# Patient Record
Sex: MALE | Race: WHITE | Employment: UNEMPLOYED | URBAN - METROPOLITAN AREA
[De-identification: names, ages, dates, MRNs, and addresses within clinical notes are randomized per-mention and may not be internally consistent; named-entity substitution may affect disease eponyms.]

---

## 2017-01-22 ENCOUNTER — HOSPITAL ENCOUNTER (EMERGENCY)
Age: 42
Discharge: HOME | End: 2017-01-22
Attending: EMERGENCY MEDICINE
Payer: SELF-PAY

## 2017-01-22 VITALS
BODY MASS INDEX: 25.19 KG/M2 | SYSTOLIC BLOOD PRESSURE: 136 MMHG | OXYGEN SATURATION: 98 % | WEIGHT: 160.5 LBS | HEART RATE: 88 BPM | DIASTOLIC BLOOD PRESSURE: 88 MMHG | HEIGHT: 67 IN | TEMPERATURE: 97.5 F | RESPIRATION RATE: 18 BRPM

## 2017-01-22 DIAGNOSIS — Z20.2 STD EXPOSURE: Primary | ICD-10-CM

## 2017-01-22 DIAGNOSIS — B37.41 YEAST CYSTITIS: ICD-10-CM

## 2017-01-22 LAB
APPEARANCE UR: CLEAR
BACTERIA URNS QL MICRO: NEGATIVE /HPF
BILIRUB UR QL: NEGATIVE
COLOR UR: ABNORMAL
EPITH CASTS URNS QL MICRO: ABNORMAL /LPF
GLUCOSE UR STRIP.AUTO-MCNC: NEGATIVE MG/DL
HGB UR QL STRIP: NEGATIVE
KETONES UR QL STRIP.AUTO: NEGATIVE MG/DL
LEUKOCYTE ESTERASE UR QL STRIP.AUTO: ABNORMAL
NITRITE UR QL STRIP.AUTO: NEGATIVE
PH UR STRIP: 7 [PH] (ref 5–8)
PROT UR STRIP-MCNC: NEGATIVE MG/DL
RBC #/AREA URNS HPF: ABNORMAL /HPF (ref 0–5)
SP GR UR REFRACTOMETRY: 1.01 (ref 1–1.03)
UA: UC IF INDICATED,UAUC: ABNORMAL
UROBILINOGEN UR QL STRIP.AUTO: 4 EU/DL (ref 0.2–1)
WBC URNS QL MICRO: ABNORMAL /HPF (ref 0–4)
YEAST URNS QL MICRO: PRESENT

## 2017-01-22 PROCEDURE — 87086 URINE CULTURE/COLONY COUNT: CPT | Performed by: EMERGENCY MEDICINE

## 2017-01-22 PROCEDURE — 99283 EMERGENCY DEPT VISIT LOW MDM: CPT

## 2017-01-22 PROCEDURE — 96372 THER/PROPH/DIAG INJ SC/IM: CPT

## 2017-01-22 PROCEDURE — 74011250636 HC RX REV CODE- 250/636: Performed by: PHYSICIAN ASSISTANT

## 2017-01-22 PROCEDURE — 74011250637 HC RX REV CODE- 250/637: Performed by: PHYSICIAN ASSISTANT

## 2017-01-22 PROCEDURE — 87491 CHLMYD TRACH DNA AMP PROBE: CPT | Performed by: PHYSICIAN ASSISTANT

## 2017-01-22 PROCEDURE — 81001 URINALYSIS AUTO W/SCOPE: CPT | Performed by: PHYSICIAN ASSISTANT

## 2017-01-22 PROCEDURE — 74011000250 HC RX REV CODE- 250: Performed by: PHYSICIAN ASSISTANT

## 2017-01-22 RX ORDER — AZITHROMYCIN 250 MG/1
1000 TABLET, FILM COATED ORAL
Status: COMPLETED | OUTPATIENT
Start: 2017-01-22 | End: 2017-01-22

## 2017-01-22 RX ORDER — FLUCONAZOLE 150 MG/1
150 TABLET ORAL
Qty: 1 TAB | Refills: 0 | Status: SHIPPED | OUTPATIENT
Start: 2017-01-22 | End: 2017-01-22

## 2017-01-22 RX ADMIN — AZITHROMYCIN 1000 MG: 250 TABLET, FILM COATED ORAL at 11:27

## 2017-01-22 RX ADMIN — CEFTRIAXONE SODIUM 250 MG: 250 INJECTION, POWDER, FOR SOLUTION INTRAMUSCULAR; INTRAVENOUS at 11:28

## 2017-01-22 NOTE — ED NOTES
The patient was discharged home by Katiana Sage  in stable condition. The patient is alert and oriented, in no respiratory distress and discharge vital signs obtained. The patient's diagnosis, condition and treatment were explained to the pt. The patient expressed understanding. 1 prescription given. No work/school note given. A discharge plan has been developed. A  was not involved in the process. Aftercare instructions were given to the pt.   Pt ambulatory out of the ED

## 2017-01-22 NOTE — DISCHARGE INSTRUCTIONS
Exposure to Sexually Transmitted Infections: Care Instructions  Your Care Instructions  Sexually transmitted infections (STIs) are those diseases spread by sexual contact. There are at least 20 different STIs, including chlamydia, gonorrhea, syphilis, and human immunodeficiency virus (HIV), which causes AIDS. Bacteria-caused STIs can be treated and cured. STIs caused by viruses, such as HIV, can be treated but not cured. Some STIs can reduce a woman's chances of getting pregnant in the future. STIs are spread during sexual contact, such as vaginal intercourse and oral or anal sex. Follow-up care is a key part of your treatment and safety. Be sure to make and go to all appointments, and call your doctor if you are having problems. Its also a good idea to know your test results and keep a list of the medicines you take. How can you care for yourself at home? · Your doctor may have given you a shot of antibiotics. If your doctor prescribed antibiotic pills, take them as directed. Do not stop taking them just because you feel better. You need to take the full course of antibiotics. · Do not have sexual contact while you have symptoms of an STI or are being treated for an STI. · Tell your sex partner (or partners) that he or she will need treatment. · If you are a woman, do not douche. Douching changes the normal balance of bacteria in the vagina and may spread an infection up into your reproductive organs. To prevent exposure to STIs in the future  · Use latex condoms every time you have sex. Use them from the beginning to the end of sexual contact. · Talk to your partner before you have sex. Find out if he or she has or is at risk for any STI. Keep in mind that a person may be able to spread an STI even if he or she does not have symptoms. · Do not have sex if you are being treated for an STI. · Do not have sex with anyone who has symptoms of an STI, such as sores on the genitals or mouth.   · Having one sex partner (who does not have STIs and does not have sex with anyone else) is a good way to avoid STIs. When should you call for help? Call your doctor now or seek immediate medical care if:  · You have new pain in your belly or pelvis. · You have symptoms of a urinary tract infection. These may include:  ¨ Pain or burning when you urinate. ¨ A frequent need to urinate without being able to pass much urine. ¨ Pain in the flank, which is just below the rib cage and above the waist on either side of the back. ¨ Blood in your urine. ¨ A fever. · You have new or worsening pain or swelling in the scrotum. Watch closely for changes in your health, and be sure to contact your doctor if:  · You have unusual vaginal bleeding. · You have a discharge from the vagina or penis. · You have any new symptoms, such as sores, bumps, rashes, blisters, or warts. · You have itching, tingling, pain, or burning in the genital or anal area. · You think you may have an STI. Where can you learn more? Go to http://manisha-anne marie.info/. Enter X104 in the search box to learn more about \"Exposure to Sexually Transmitted Infections: Care Instructions. \"  Current as of: May 27, 2016  Content Version: 11.1  © 6216-4374 [employer]. Care instructions adapted under license by [employer] (which disclaims liability or warranty for this information). If you have questions about a medical condition or this instruction, always ask your healthcare professional. [place] [address] any warranty or liability for your use of this information. Candidiasis: Care Instructions  Your Care Instructions  Candidiasis (say \"hjy-yih-ZN-uh-cassandra\") is a yeast infection. Yeast normally lives in your body. But it can cause problems if your body's defenses don't work as they should. Some medicines can increase your chance of getting a yeast infection.  These include antibiotics, steroids, and cancer drugs. And some diseases like AIDS and diabetes can make you more likely to get yeast infections. There are different types of yeast infections. Alina Bolanos is a yeast infection in the mouth. It usually occurs in people with weak immune systems. It causes white patches inside the mouth and throat. Yeast infections of the skin usually occur in skin folds where the skin stays moist. They cause red, oozing patches on your skin. Babies can get these infections under the diaper. People who often wear gloves can get them on their hands. Many women get vaginal yeast infections. They are most common when women take antibiotics. These infections can cause the vagina to itch and burn. They also cause white discharge that looks like cottage cheese. In rare cases, yeast infects the blood. This can cause serious disease. This kind of infection is treated with medicine given through a needle into a vein (IV). After you start treatment, a yeast infection usually goes away quickly. But if your immune system is weak, the infection may come back. Tell your doctor if you get yeast infections often. Follow-up care is a key part of your treatment and safety. Be sure to make and go to all appointments, and call your doctor if you are having problems. It's also a good idea to know your test results and keep a list of the medicines you take. How can you care for yourself at home? · Take your medicines exactly as prescribed. Call your doctor if you think you are having a problem with your medicine. · Use antibiotics only as directed by your doctor. · Eat yogurt with live cultures. It has bacteria called lactobacillus. It may help prevent some types of yeast infections. · Keep your skin clean and dry. Put powder on moist places. · If you are using a cream or suppository to treat a vaginal yeast infection, don't use condoms or a diaphragm. Use a different type of birth control. · Eat a healthy diet and get regular exercise. This will help keep your immune system strong. When should you call for help? Call your doctor now or seek immediate medical care if:  · You have a fever. · You are pregnant and have signs of a vaginal or urinary tract infection such as:  ¨ Severe itching in your vagina. ¨ Pain during sex or when you urinate. ¨ Unusual discharge from your vagina. ¨ A frequent urge to urinate. ¨ Urine that is cloudy or smells bad. Watch closely for changes in your health, and be sure to contact your doctor if:  · You do not get better as expected. Where can you learn more? Go to http://manishaJanalakshmianne marie.info/. Enter H441 in the search box to learn more about \"Candidiasis: Care Instructions. \"  Current as of: February 5, 2016  Content Version: 11.1  © 4084-4276 [employer]. Care instructions adapted under license by [employer] (which disclaims liability or warranty for this information). If you have questions about a medical condition or this instruction, always ask your healthcare professional. [place] [address] any warranty or liability for your use of this information. We hope that we have addressed all of your medical concerns. The examination and treatment you received in the Emergency Department were for an emergent problem and were not intended as complete care. It is important that you follow up with your healthcare provider(s) for ongoing care. If your symptoms worsen or do not improve as expected, and you are unable to reach your usual health care provider(s), you should return to the Emergency Department. Today's healthcare is undergoing tremendous change, and patient satisfaction surveys are one of the many tools to assess the quality of medical care. You may receive a survey from the [employer] regarding your experience in the Emergency Department.   I hope that your experience has been completely positive, particularly the medical care that I provided. As such, please participate in the survey; anything less than excellent does not meet my expectations or intentions. [facility] and [facility] participate in nationally recognized quality of care measures. If your blood pressure is greater than 120/80, as reported below, we urge that you seek medical care to address the potential of high blood pressure, commonly known as hypertension. Hypertension can be hereditary or can be caused by certain medical conditions, pain, stress, or \"white coat syndrome. \"       Please make an appointment with your health care provider(s) for follow up of your Emergency Department visit. VITALS:   Patient Vitals for the past 8 hrs:   Temp Pulse Resp BP SpO2   01/22/17 1112 97.5 °F (36.4 °C) 96 16 (!) 189/108 98 %          Thank you for allowing us to provide you with medical care today. We realize that you have many choices for your emergency care needs. Please choose us in the future for any continued health care needs. Agustina Orozco, [address] [place] [address].   Office: [phone]            Recent Results (from the past 24 hour(s))   URINALYSIS W/ REFLEX CULTURE    Collection Time: 01/22/17 11:20 AM   Result Value Ref Range    Color YELLOW/STRAW      Appearance CLEAR CLEAR      Specific gravity 1.010 1.003 - 1.030      pH (UA) 7.0 5.0 - 8.0      Protein NEGATIVE  NEG mg/dL    Glucose NEGATIVE  NEG mg/dL    Ketone NEGATIVE  NEG mg/dL    Bilirubin NEGATIVE  NEG      Blood NEGATIVE  NEG      Urobilinogen 4.0 (H) 0.2 - 1.0 EU/dL    Nitrites NEGATIVE  NEG      Leukocyte Esterase SMALL (A) NEG      WBC 5-10 0 - 4 /hpf    RBC 0-5 0 - 5 /hpf    Epithelial cells FEW FEW /lpf    Bacteria NEGATIVE  NEG /hpf    UA:UC IF INDICATED URINE CULTURE ORDERED (A) CNI      Yeast PRESENT (A) NEG         No results found.

## 2017-01-22 NOTE — ED PROVIDER NOTES
HPI Comments: Patient presents by private vehicle. Patient reports dysuria and white penile discharge. Patient reports symptoms have been intermittent for 1 month. Patient reports same sexual partner for past 6 months but says recently partner has cheated. Patient denies fever, chills. Patient is a 46 y.o. male presenting with penile problem. The history is provided by the patient. Penis Injury   This is a new problem. The current episode started more than 1 week ago. The problem occurs constantly. The problem has not changed since onset. Primary symptoms include dysuria and penile discharge. Pertinent negatives include no genital itching, no genital lesions, no genital rash, no penile pain, no testicular mass, no swelling, no scrotal pain, no priapism and no inability to urinate. The symptoms occur during urination and spontaneously. The discharge is white and clear. Pertinent negatives include no anorexia, no diaphoresis, no nausea, no vomiting, no abdominal pain, no abdominal swelling, no frequency, no constipation, no diarrhea and no flank pain. There has been no fever. He has tried nothing for the symptoms. Sexual activity: sexually active. He never uses condoms. Patient has had no prior STD. Partner displays symptoms of an STD: yes. Associated medical issues do not include gonorrhea, syphilis, chlamydia, erectile dysfunction, erectile aid use or HIV. Past Medical History:   Diagnosis Date    Hyperlipidemia     Hypertension        History reviewed. No pertinent past surgical history. Family History:   Problem Relation Age of Onset    Hypertension Father        Social History     Social History    Marital status: SINGLE     Spouse name: N/A    Number of children: N/A    Years of education: N/A     Occupational History    Not on file.      Social History Main Topics    Smoking status: Current Every Day Smoker     Packs/day: 2.00     Years: 20.00    Smokeless tobacco: Not on file    Alcohol use Yes    Drug use: No    Sexual activity: Not on file     Other Topics Concern    Not on file     Social History Narrative         ALLERGIES: Review of patient's allergies indicates no known allergies. Review of Systems   Constitutional: Negative. Negative for diaphoresis. HENT: Negative. Eyes: Negative. Respiratory: Negative. Cardiovascular: Negative. Gastrointestinal: Negative. Negative for abdominal pain, anorexia, constipation, diarrhea, nausea and vomiting. Endocrine: Negative. Genitourinary: Positive for discharge, dysuria and penile discharge. Negative for flank pain, frequency and penile pain. Musculoskeletal: Negative. Skin: Negative. Allergic/Immunologic: Negative. Neurological: Negative. Hematological: Negative. All other systems reviewed and are negative. Vitals:    01/22/17 1112   BP: (!) 189/108   Pulse: 96   Resp: 16   Temp: 97.5 °F (36.4 °C)   SpO2: 98%   Weight: 72.8 kg (160 lb 7.9 oz)   Height: 5' 7\" (1.702 m)            Physical Exam   Constitutional: He is oriented to person, place, and time. He appears well-developed and well-nourished. HENT:   Head: Normocephalic and atraumatic. Right Ear: External ear normal.   Left Ear: External ear normal.   Mouth/Throat: Oropharynx is clear and moist. No oropharyngeal exudate. Eyes: Conjunctivae and EOM are normal. Pupils are equal, round, and reactive to light. Right eye exhibits no discharge. Left eye exhibits no discharge. No scleral icterus. Neck: Normal range of motion. Neck supple. No tracheal deviation present. No thyromegaly present. Cardiovascular: Normal rate, regular rhythm, normal heart sounds and intact distal pulses. No murmur heard. Pulmonary/Chest: Effort normal and breath sounds normal. No respiratory distress. He has no wheezes. He has no rales. Abdominal: Soft. Bowel sounds are normal. He exhibits no distension. There is no tenderness.  There is no rebound and no guarding. Genitourinary: No swelling in the scrotum or testes. Musculoskeletal: Normal range of motion. He exhibits no edema or tenderness. Lymphadenopathy:     He has no cervical adenopathy. Neurological: He is alert and oriented to person, place, and time. No cranial nerve deficit. Coordination normal.   Skin: Skin is warm. No rash noted. No erythema. Psychiatric: He has a normal mood and affect. His behavior is normal. Judgment and thought content normal.   Nursing note and vitals reviewed. MDM  Number of Diagnoses or Management Options  STD exposure:   Yeast cystitis:   Diagnosis management comments: The patient presents with penile discharge and dysuria with a differential diagnosis of uti, yeast cystitis, STI. Assesment/Plan- G/C test pending. Treated in ED. Urine analysis with yeast, patient provided with prescription for diflucan. Patient encouraged to follow up with PCP and patient is educated on reasons to return to the ED.          Amount and/or Complexity of Data Reviewed  Clinical lab tests: ordered and reviewed      ED Course       Procedures

## 2017-01-22 NOTE — ED TRIAGE NOTES
\"I think I have a STD. \"  Burning with urination started with urination. Clear/white penile d/c x 3 days. Same sex partner for past 6 months, but says she has recently cheated.

## 2017-01-23 LAB
C TRACH DNA SPEC QL NAA+PROBE: NEGATIVE
N GONORRHOEA DNA SPEC QL NAA+PROBE: NEGATIVE
SAMPLE TYPE: NORMAL
SERVICE CMNT-IMP: NORMAL
SPECIMEN SOURCE: NORMAL

## 2017-01-24 LAB
BACTERIA SPEC CULT: NORMAL
CC UR VC: NORMAL
SERVICE CMNT-IMP: NORMAL

## 2018-07-30 ENCOUNTER — HOSPITAL ENCOUNTER (EMERGENCY)
Age: 43
Discharge: HOME | End: 2018-07-30
Attending: EMERGENCY MEDICINE
Payer: SELF-PAY

## 2018-07-30 ENCOUNTER — APPOINTMENT (OUTPATIENT)
Dept: CT IMAGING | Age: 43
End: 2018-07-30
Attending: EMERGENCY MEDICINE
Payer: SELF-PAY

## 2018-07-30 VITALS
WEIGHT: 160.27 LBS | TEMPERATURE: 97.6 F | OXYGEN SATURATION: 97 % | DIASTOLIC BLOOD PRESSURE: 95 MMHG | SYSTOLIC BLOOD PRESSURE: 146 MMHG | RESPIRATION RATE: 16 BRPM | BODY MASS INDEX: 25.1 KG/M2 | HEART RATE: 86 BPM

## 2018-07-30 DIAGNOSIS — I10 ESSENTIAL HYPERTENSION: ICD-10-CM

## 2018-07-30 DIAGNOSIS — G45.8 OTHER SPECIFIED TRANSIENT CEREBRAL ISCHEMIAS: Primary | ICD-10-CM

## 2018-07-30 LAB
ALBUMIN SERPL-MCNC: 3.5 G/DL (ref 3.5–5)
ALBUMIN/GLOB SERPL: 1 {RATIO} (ref 1.1–2.2)
ALP SERPL-CCNC: 93 U/L (ref 45–117)
ALT SERPL-CCNC: 21 U/L (ref 12–78)
ANION GAP SERPL CALC-SCNC: 10 MMOL/L (ref 5–15)
APTT PPP: 26.1 SEC (ref 22.1–32)
AST SERPL-CCNC: 14 U/L (ref 15–37)
ATRIAL RATE: 74 BPM
BASOPHILS # BLD: 0 K/UL (ref 0–0.1)
BASOPHILS NFR BLD: 1 % (ref 0–1)
BILIRUB SERPL-MCNC: 0.3 MG/DL (ref 0.2–1)
BUN SERPL-MCNC: 13 MG/DL (ref 6–20)
BUN/CREAT SERPL: 9 (ref 12–20)
CALCIUM SERPL-MCNC: 9.1 MG/DL (ref 8.5–10.1)
CALCULATED P AXIS, ECG09: 41 DEGREES
CALCULATED R AXIS, ECG10: 34 DEGREES
CALCULATED T AXIS, ECG11: 10 DEGREES
CHLORIDE SERPL-SCNC: 105 MMOL/L (ref 97–108)
CO2 SERPL-SCNC: 27 MMOL/L (ref 21–32)
CREAT SERPL-MCNC: 1.45 MG/DL (ref 0.7–1.3)
DIAGNOSIS, 93000: NORMAL
DIFFERENTIAL METHOD BLD: NORMAL
EOSINOPHIL # BLD: 0.3 K/UL (ref 0–0.4)
EOSINOPHIL NFR BLD: 4 % (ref 0–7)
ERYTHROCYTE [DISTWIDTH] IN BLOOD BY AUTOMATED COUNT: 13.2 % (ref 11.5–14.5)
GLOBULIN SER CALC-MCNC: 3.4 G/DL (ref 2–4)
GLUCOSE SERPL-MCNC: 94 MG/DL (ref 65–100)
HCT VFR BLD AUTO: 44.5 % (ref 36.6–50.3)
HGB BLD-MCNC: 15.2 G/DL (ref 12.1–17)
INR PPP: 1 (ref 0.9–1.1)
LYMPHOCYTES # BLD: 1.5 K/UL
LYMPHOCYTES NFR BLD: 22 % (ref 12–49)
MCH RBC QN AUTO: 30.7 PG (ref 26–34)
MCHC RBC AUTO-ENTMCNC: 34.2 G/DL (ref 30–36.5)
MCV RBC AUTO: 89.9 FL (ref 80–99)
MONOCYTES # BLD: 0.7 K/UL (ref 0–1)
MONOCYTES NFR BLD: 10 % (ref 5–13)
NEUTS SEG # BLD: 4.4 K/UL (ref 1.8–8)
NEUTS SEG NFR BLD: 63 % (ref 32–75)
P-R INTERVAL, ECG05: 130 MS
PLATELET # BLD AUTO: 215 K/UL (ref 150–400)
PMV BLD AUTO: 11.3 FL (ref 8.9–12.9)
POTASSIUM SERPL-SCNC: 3.8 MMOL/L (ref 3.5–5.1)
PROT SERPL-MCNC: 6.9 G/DL (ref 6.4–8.2)
PROTHROMBIN TIME: 9.4 SEC (ref 9–11.1)
Q-T INTERVAL, ECG07: 400 MS
QRS DURATION, ECG06: 88 MS
QTC CALCULATION (BEZET), ECG08: 444 MS
RBC # BLD AUTO: 4.95 M/UL (ref 4.1–5.7)
SODIUM SERPL-SCNC: 142 MMOL/L (ref 136–145)
THERAPEUTIC RANGE,PTTT: NORMAL SECS (ref 58–77)
VENTRICULAR RATE, ECG03: 74 BPM
WBC # BLD AUTO: 6.9 K/UL (ref 4.1–11.1)
XXWBCSUS: 0

## 2018-07-30 PROCEDURE — 80053 COMPREHEN METABOLIC PANEL: CPT | Performed by: EMERGENCY MEDICINE

## 2018-07-30 PROCEDURE — 70450 CT HEAD/BRAIN W/O DYE: CPT

## 2018-07-30 PROCEDURE — 85730 THROMBOPLASTIN TIME PARTIAL: CPT | Performed by: EMERGENCY MEDICINE

## 2018-07-30 PROCEDURE — 36415 COLL VENOUS BLD VENIPUNCTURE: CPT | Performed by: EMERGENCY MEDICINE

## 2018-07-30 PROCEDURE — 85610 PROTHROMBIN TIME: CPT | Performed by: EMERGENCY MEDICINE

## 2018-07-30 PROCEDURE — 74011250637 HC RX REV CODE- 250/637: Performed by: EMERGENCY MEDICINE

## 2018-07-30 PROCEDURE — 99284 EMERGENCY DEPT VISIT MOD MDM: CPT

## 2018-07-30 PROCEDURE — 93005 ELECTROCARDIOGRAM TRACING: CPT

## 2018-07-30 PROCEDURE — 85025 COMPLETE CBC W/AUTO DIFF WBC: CPT | Performed by: EMERGENCY MEDICINE

## 2018-07-30 RX ORDER — HYDROCHLOROTHIAZIDE 12.5 MG/1
12.5 TABLET ORAL DAILY
Qty: 30 TAB | Refills: 1 | Status: SHIPPED | OUTPATIENT
Start: 2018-07-30 | End: 2018-12-13 | Stop reason: SDUPTHER

## 2018-07-30 RX ORDER — ATORVASTATIN CALCIUM 10 MG/1
10 TABLET, FILM COATED ORAL DAILY
Qty: 30 TAB | Refills: 1 | Status: SHIPPED | OUTPATIENT
Start: 2018-07-30 | End: 2018-12-13 | Stop reason: SDUPTHER

## 2018-07-30 RX ORDER — ASPIRIN 81 MG/1
81 TABLET ORAL DAILY
Qty: 30 TAB | Refills: 1 | Status: SHIPPED | OUTPATIENT
Start: 2018-07-30 | End: 2020-09-17

## 2018-07-30 RX ORDER — LISINOPRIL 10 MG/1
10 TABLET ORAL DAILY
Qty: 30 TAB | Refills: 0 | Status: SHIPPED | OUTPATIENT
Start: 2018-07-30 | End: 2018-12-13 | Stop reason: SDUPTHER

## 2018-07-30 RX ORDER — CLONIDINE HYDROCHLORIDE 0.1 MG/1
0.2 TABLET ORAL
Status: COMPLETED | OUTPATIENT
Start: 2018-07-30 | End: 2018-07-30

## 2018-07-30 RX ADMIN — CLONIDINE HYDROCHLORIDE 0.2 MG: 0.1 TABLET ORAL at 07:38

## 2018-07-30 NOTE — LETTER
[facility] EMERGENCY DEPT 
[address] [facility] [place] [id number] 
[phone] Work/School Note Date: 7/30/2018 To Whom It May concern: 
 
Tyler George was seen and treated today in the emergency room by the following provider(s): 
Attending Provider: Oliver Wolfe MD.   
 
Tyler George may return to work on 7/31/2018.  
 
Sincerely, 
 
 
 
 
Lorena Riggins RN

## 2018-07-30 NOTE — DISCHARGE INSTRUCTIONS
Learning About Diuretics for High Blood Pressure  Introduction  Diuretics help to lower blood pressure. This reduces your risk of a heart attack and stroke. It also reduces your risk of kidney disease. Diuretics cause your kidneys to remove sodium and water. They also relax the blood vessel walls. These help lower your blood pressure. Examples  · Chlorthalidone  · Hydrochlorothiazide  Possible side effects  There are some common side effects. They are:  · Too little potassium. · Feeling dizzy. · Rash. · Urinating a lot. · High blood sugar. (But this is not common.)  You may have other side effects. Check the information that comes with your medicine. What to know about taking this medicine  · You may take other medicines for blood pressure. Diuretics can help those work better. They can also prevent extra fluid in your body. · You may need to take potassium pills. Or you may have to watch how much potassium is in your food. Ask your doctor about this. · You may need blood tests to check your kidneys and your potassium level. · Take your medicines exactly as prescribed. Call your doctor if you think you are having a problem with your medicine. · Check with your doctor or pharmacist before you use any other medicines. This includes over-the-counter medicines. Make sure your doctor knows all of the medicines, vitamins, herbal products, and supplements you take. Taking some medicines together can cause problems. Where can you learn more? Go to http://manisha-anne marie.info/. Enter I880 in the search box to learn more about \"Learning About Diuretics for High Blood Pressure. \"  Current as of: May 10, 2017  Content Version: 11.7  © 5534-7148 [employer]. Care instructions adapted under license by [employer] (which disclaims liability or warranty for this information).  If you have questions about a medical condition or this instruction, always ask your healthcare rianna. [address] any warranty or liability for your use of this information. We hope that we have addressed all of your medical concerns. The examination and treatment you received in the Emergency Department were for an emergent problem and were not intended as complete care. It is important that you follow up with your healthcare provider(s) for ongoing care. If your symptoms worsen or do not improve as expected, and you are unable to reach your usual health care provider(s), you should return to the Emergency Department. Today's healthcare is undergoing tremendous change, and patient satisfaction surveys are one of the many tools to assess the quality of medical care. You may receive a survey from the [employer] organization regarding your experience in the Emergency Department. I hope that your experience has been completely positive, particularly the medical care that I provided. As such, please participate in the survey; anything less than excellent does not meet my expectations or intentions. [facility] and [address] [place] [address] participate in nationally recognized quality of care measures. If your blood pressure is greater than 120/80, as reported below, we urge that you seek medical care to address the potential of high blood pressure, commonly known as hypertension. Hypertension can be hereditary or can be caused by certain medical conditions, pain, stress, or \"white coat syndrome. \"       Please make an appointment with your health care provider(s) for follow up of your Emergency Department visit.        VITALS:   Patient Vitals for the past 8 hrs:   Temp Pulse Resp BP SpO2   07/30/18 0830 - - - (!) 146/95 97 %   07/30/18 0800 - - - (!) 181/112 98 %   07/30/18 0745 - - - - 98 %   07/30/18 0744 - - - (!) 175/112 -   07/30/18 0703 97.6 °F (36.4 °C) 86 16 (!) 217/122 98 %          Thank you for allowing us to provide you with medical care today. We realize that you have many choices for your emergency care needs. Please choose us in the future for any continued health care needs. Nupur Mistymaegan Magnant, [address] [place], [place] [address].   Office: [phone]            Recent Results (from the past 24 hour(s))   EKG, 12 LEAD, INITIAL    Collection Time: 07/30/18  7:32 AM   Result Value Ref Range    Ventricular Rate 74 BPM    Atrial Rate 74 BPM    P-R Interval 130 ms    QRS Duration 88 ms    Q-T Interval 400 ms    QTC Calculation (Bezet) 444 ms    Calculated P Axis 41 degrees    Calculated R Axis 34 degrees    Calculated T Axis 10 degrees    Diagnosis       Normal sinus rhythm  Normal ECG  When compared with ECG of 27-SEP-2016 14:40,  No significant change was found     CBC WITH AUTOMATED DIFF    Collection Time: 07/30/18  7:46 AM   Result Value Ref Range    WBC 6.9 4.1 - 11.1 K/uL    RBC 4.95 4.10 - 5.70 M/uL    HGB 15.2 12.1 - 17.0 g/dL    HCT 44.5 36.6 - 50.3 %    MCV 89.9 80.0 - 99.0 FL    MCH 30.7 26.0 - 34.0 PG    MCHC 34.2 30.0 - 36.5 g/dL    RDW 13.2 11.5 - 14.5 %    PLATELET 669 259 - 796 K/uL    MPV 11.3 8.9 - 12.9 FL    NEUTROPHILS 63 32 - 75 %    LYMPHOCYTES 22 12 - 49 %    MONOCYTES 10 5 - 13 %    EOSINOPHILS 4 0 - 7 %    BASOPHILS 1 0 - 1 %    ABS. NEUTROPHILS 4.4 1.8 - 8.0 K/UL    ABS. LYMPHOCYTES 1.5 K/UL    ABS. MONOCYTES 0.7 0.0 - 1.0 K/UL    ABS. EOSINOPHILS 0.3 0.0 - 0.4 K/UL    ABS.  BASOPHILS 0.0 0.0 - 0.1 K/UL    DF AUTOMATED      XXWBCSUS 0     METABOLIC PANEL, COMPREHENSIVE    Collection Time: 07/30/18  7:46 AM   Result Value Ref Range    Sodium 142 136 - 145 mmol/L    Potassium 3.8 3.5 - 5.1 mmol/L    Chloride 105 97 - 108 mmol/L    CO2 27 21 - 32 mmol/L    Anion gap 10 5 - 15 mmol/L    Glucose 94 65 - 100 mg/dL    BUN 13 6 - 20 MG/DL    Creatinine 1.45 (H) 0.70 - 1.30 MG/DL    BUN/Creatinine ratio 9 (L) 12 - 20      GFR est AA >60 >60 ml/min/1.73m2    GFR est non-AA 54 (L) >60 ml/min/1.73m2    Calcium 9.1 8.5 - 10.1 MG/DL    Bilirubin, total 0.3 0.2 - 1.0 MG/DL    ALT (SGPT) 21 12 - 78 U/L    AST (SGOT) 14 (L) 15 - 37 U/L    Alk. phosphatase 93 45 - 117 U/L    Protein, total 6.9 6.4 - 8.2 g/dL    Albumin 3.5 3.5 - 5.0 g/dL    Globulin 3.4 2.0 - 4.0 g/dL    A-G Ratio 1.0 (L) 1.1 - 2.2     PTT    Collection Time: 07/30/18  7:46 AM   Result Value Ref Range    aPTT 26.1 22.1 - 32.0 sec    aPTT, therapeutic range     58.0 - 77.0 SECS   PROTHROMBIN TIME + INR    Collection Time: 07/30/18  7:46 AM   Result Value Ref Range    INR 1.0 0.9 - 1.1      Prothrombin time 9.4 9.0 - 11.1 sec       Ct Head Wo Cont    Result Date: 7/30/2018  HEAD CT WITHOUT CONTRAST: 7/30/2018 7:37 AM INDICATION: Transient ischemic attack. COMPARISON: 9/27/2016. PROCEDURE: Axial images of the head were obtained without contrast. Coronal and sagittal reformats were performed. CT dose reduction was achieved through use of a standardized protocol tailored for this examination and automatic exposure control for dose modulation. FINDINGS: The ventricles and sulci are appropriate in size and configuration for age. No loss of gray-white differentiation to suggest late acute or early subacute infarction. No mass effect or intracranial hemorrhage. IMPRESSION: No acute intracranial abnormality.

## 2018-07-30 NOTE — ED TRIAGE NOTES
Pt presents to ED with c/o transient left arm numbness in left arm last night. Pt states he is supposed to be on an antihypertensive medication but has not been on it for over a year. Pt denies any pain or symptoms.

## 2018-07-30 NOTE — ED PROVIDER NOTES
Patient is a 38 y.o. male presenting with numbness. Numbness        The patient is a 47-year-old white male with a history of a TIA about 2 years prior to admission for which he was admitted to [facility]. He was very hypertensive at that time as well and was sent home on aspirin and high blood pressure medicine but he has had no insurance and has not been on those medications for some time. Last night about 9 PM he was working and  his left arm became numb it lasted for approximately 10 minutes. He was still able to use it had no facial numbness or left leg numbness. In the ER his symptoms are completely resolved. He denies any headache. His blood pressure here initial presentation was 220/120. Past Medical History:   Diagnosis Date    Hyperlipidemia     Hypertension        History reviewed. No pertinent surgical history. Family History:   Problem Relation Age of Onset    Hypertension Father        Social History     Social History    Marital status: SINGLE     Spouse name: N/A    Number of children: N/A    Years of education: N/A     Occupational History    Not on file. Social History Main Topics    Smoking status: Current Every Day Smoker     Packs/day: 1.00     Years: 20.00    Smokeless tobacco: Never Used    Alcohol use Yes    Drug use: No    Sexual activity: Not on file     Other Topics Concern    Not on file     Social History Narrative         ALLERGIES: Review of patient's allergies indicates no known allergies. Review of Systems   Neurological: Positive for numbness. All other systems reviewed and are negative. Vitals:    07/30/18 0703   BP: (!) 217/122   Pulse: 86   Resp: 16   Temp: 97.6 °F (36.4 °C)   SpO2: 98%   Weight: 72.7 kg (160 lb 4.4 oz)            Physical Exam   Constitutional: He is oriented to person, place, and time. He appears well-developed and well-nourished. HENT:   Head: Normocephalic and atraumatic.    Mouth/Throat: Oropharynx is clear and moist. No oropharyngeal exudate. Eyes: Conjunctivae are normal. No scleral icterus. Neck: Neck supple. No thyromegaly present. Cardiovascular: Normal rate, regular rhythm and normal heart sounds. Exam reveals no gallop and no friction rub. No murmur heard. Pulmonary/Chest: Effort normal and breath sounds normal. No stridor. No respiratory distress. He has no wheezes. He has no rales. Abdominal: Soft. Bowel sounds are normal. There is no tenderness. There is no rebound and no guarding. Musculoskeletal: Normal range of motion. Lymphadenopathy:     He has no cervical adenopathy. Neurological: He is alert and oriented to person, place, and time. Normal neurologic exam no facial droop no A. Fascia no pronator drift equal  strengths finger to nose normal   Skin: Skin is warm and dry. Psychiatric: He has a normal mood and affect. Nursing note and vitals reviewed. [facility]      ED Course       Procedures      Assessment and plan The patient's workup was essentially negative he was offered admission but he adamantly refused I believe he may have had a small TIA his blood pressure was very elevated but came under control with clonidine I will discharge him home on baby aspirin, lisinopril HCTZ, and Lipitor with close followup by his PCP.

## 2018-11-09 ENCOUNTER — HOSPITAL ENCOUNTER (EMERGENCY)
Age: 43
Discharge: ANOTHER HOSPITAL | End: 2018-11-10
Attending: EMERGENCY MEDICINE
Payer: SUBSIDIZED

## 2018-11-09 ENCOUNTER — APPOINTMENT (OUTPATIENT)
Dept: GENERAL RADIOLOGY | Age: 43
End: 2018-11-09
Attending: EMERGENCY MEDICINE
Payer: SUBSIDIZED

## 2018-11-09 ENCOUNTER — APPOINTMENT (OUTPATIENT)
Dept: CT IMAGING | Age: 43
End: 2018-11-09
Attending: EMERGENCY MEDICINE
Payer: SUBSIDIZED

## 2018-11-09 DIAGNOSIS — I15.9 SECONDARY HYPERTENSION: Primary | ICD-10-CM

## 2018-11-09 DIAGNOSIS — R19.00 RETROPERITONEAL MASS: ICD-10-CM

## 2018-11-09 DIAGNOSIS — Z91.14 NONCOMPLIANCE WITH MEDICATION REGIMEN: ICD-10-CM

## 2018-11-09 DIAGNOSIS — F17.200 SMOKING: ICD-10-CM

## 2018-11-09 LAB
ALBUMIN SERPL-MCNC: 3.3 G/DL (ref 3.5–5)
ALBUMIN/GLOB SERPL: 0.9 {RATIO} (ref 1.1–2.2)
ALP SERPL-CCNC: 98 U/L (ref 45–117)
ALT SERPL-CCNC: 14 U/L (ref 12–78)
ANION GAP SERPL CALC-SCNC: 12 MMOL/L (ref 5–15)
APPEARANCE UR: CLEAR
AST SERPL-CCNC: 10 U/L (ref 15–37)
BACTERIA URNS QL MICRO: NEGATIVE /HPF
BASOPHILS # BLD: 0 K/UL (ref 0–0.1)
BASOPHILS NFR BLD: 0 % (ref 0–1)
BILIRUB SERPL-MCNC: 0.2 MG/DL (ref 0.2–1)
BILIRUB UR QL: NEGATIVE
BUN SERPL-MCNC: 8 MG/DL (ref 6–20)
BUN/CREAT SERPL: 7 (ref 12–20)
CALCIUM SERPL-MCNC: 8.9 MG/DL (ref 8.5–10.1)
CHLORIDE SERPL-SCNC: 101 MMOL/L (ref 97–108)
CO2 SERPL-SCNC: 25 MMOL/L (ref 21–32)
COLOR UR: NORMAL
CREAT SERPL-MCNC: 1.17 MG/DL (ref 0.7–1.3)
DIFFERENTIAL METHOD BLD: ABNORMAL
EOSINOPHIL # BLD: 0.2 K/UL (ref 0–0.4)
EOSINOPHIL NFR BLD: 2 % (ref 0–7)
EPITH CASTS URNS QL MICRO: NORMAL /LPF
ERYTHROCYTE [DISTWIDTH] IN BLOOD BY AUTOMATED COUNT: 14.8 % (ref 11.5–14.5)
GLOBULIN SER CALC-MCNC: 3.8 G/DL (ref 2–4)
GLUCOSE SERPL-MCNC: 98 MG/DL (ref 65–100)
GLUCOSE UR STRIP.AUTO-MCNC: NEGATIVE MG/DL
HCT VFR BLD AUTO: 39.8 % (ref 36.6–50.3)
HGB BLD-MCNC: 13.5 G/DL (ref 12.1–17)
HGB UR QL STRIP: NEGATIVE
INR PPP: 1 (ref 0.9–1.1)
KETONES UR QL STRIP.AUTO: NEGATIVE MG/DL
LEUKOCYTE ESTERASE UR QL STRIP.AUTO: NEGATIVE
LIPASE SERPL-CCNC: 198 U/L (ref 73–393)
LYMPHOCYTES # BLD: 1.5 K/UL
LYMPHOCYTES NFR BLD: 14 % (ref 12–49)
MCH RBC QN AUTO: 31.1 PG (ref 26–34)
MCHC RBC AUTO-ENTMCNC: 33.9 G/DL (ref 30–36.5)
MCV RBC AUTO: 91.7 FL (ref 80–99)
MONOCYTES # BLD: 0.9 K/UL (ref 0–1)
MONOCYTES NFR BLD: 9 % (ref 5–13)
NEUTS SEG # BLD: 7.9 K/UL (ref 1.8–8)
NEUTS SEG NFR BLD: 75 % (ref 32–75)
NITRITE UR QL STRIP.AUTO: NEGATIVE
PH UR STRIP: 6.5 [PH] (ref 5–8)
PLATELET # BLD AUTO: 260 K/UL (ref 150–400)
PMV BLD AUTO: 10.8 FL (ref 8.9–12.9)
POTASSIUM SERPL-SCNC: 3.6 MMOL/L (ref 3.5–5.1)
PROT SERPL-MCNC: 7.1 G/DL (ref 6.4–8.2)
PROT UR STRIP-MCNC: NEGATIVE MG/DL
PROTHROMBIN TIME: 9.5 SEC (ref 9–11.1)
RBC # BLD AUTO: 4.34 M/UL (ref 4.1–5.7)
RBC #/AREA URNS HPF: NORMAL /HPF (ref 0–5)
SODIUM SERPL-SCNC: 138 MMOL/L (ref 136–145)
SP GR UR REFRACTOMETRY: 1.02 (ref 1–1.03)
TROPONIN I SERPL-MCNC: <0.05 NG/ML
UROBILINOGEN UR QL STRIP.AUTO: 0.2 EU/DL (ref 0.2–1)
WBC # BLD AUTO: 10.6 K/UL (ref 4.1–11.1)
WBC URNS QL MICRO: NORMAL /HPF (ref 0–4)
XXWBCSUS: 0

## 2018-11-09 PROCEDURE — 99285 EMERGENCY DEPT VISIT HI MDM: CPT

## 2018-11-09 PROCEDURE — 72194 CT PELVIS W/O & W/DYE: CPT

## 2018-11-09 PROCEDURE — 74011250636 HC RX REV CODE- 250/636: Performed by: EMERGENCY MEDICINE

## 2018-11-09 PROCEDURE — 72100 X-RAY EXAM L-S SPINE 2/3 VWS: CPT

## 2018-11-09 PROCEDURE — 93005 ELECTROCARDIOGRAM TRACING: CPT

## 2018-11-09 PROCEDURE — 81003 URINALYSIS AUTO W/O SCOPE: CPT

## 2018-11-09 PROCEDURE — 85610 PROTHROMBIN TIME: CPT

## 2018-11-09 PROCEDURE — 80053 COMPREHEN METABOLIC PANEL: CPT

## 2018-11-09 PROCEDURE — 83690 ASSAY OF LIPASE: CPT

## 2018-11-09 PROCEDURE — 96361 HYDRATE IV INFUSION ADD-ON: CPT

## 2018-11-09 PROCEDURE — 74011636320 HC RX REV CODE- 636/320: Performed by: EMERGENCY MEDICINE

## 2018-11-09 PROCEDURE — 96374 THER/PROPH/DIAG INJ IV PUSH: CPT

## 2018-11-09 PROCEDURE — 36415 COLL VENOUS BLD VENIPUNCTURE: CPT

## 2018-11-09 PROCEDURE — 74011250637 HC RX REV CODE- 250/637: Performed by: EMERGENCY MEDICINE

## 2018-11-09 PROCEDURE — 84484 ASSAY OF TROPONIN QUANT: CPT

## 2018-11-09 PROCEDURE — 85025 COMPLETE CBC W/AUTO DIFF WBC: CPT

## 2018-11-09 RX ORDER — KETOROLAC TROMETHAMINE 30 MG/ML
15 INJECTION, SOLUTION INTRAMUSCULAR; INTRAVENOUS
Status: COMPLETED | OUTPATIENT
Start: 2018-11-09 | End: 2018-11-09

## 2018-11-09 RX ORDER — LORAZEPAM 0.5 MG/1
0.5 TABLET ORAL
COMMUNITY
End: 2018-12-28 | Stop reason: SDUPTHER

## 2018-11-09 RX ORDER — CLONIDINE HYDROCHLORIDE 0.1 MG/1
0.2 TABLET ORAL
Status: COMPLETED | OUTPATIENT
Start: 2018-11-09 | End: 2018-11-09

## 2018-11-09 RX ORDER — ONDANSETRON 4 MG/1
4 TABLET, ORALLY DISINTEGRATING ORAL
Status: COMPLETED | OUTPATIENT
Start: 2018-11-09 | End: 2018-11-09

## 2018-11-09 RX ORDER — SODIUM CHLORIDE 9 MG/ML
1000 INJECTION, SOLUTION INTRAVENOUS ONCE
Status: COMPLETED | OUTPATIENT
Start: 2018-11-09 | End: 2018-11-09

## 2018-11-09 RX ORDER — MORPHINE SULFATE 2 MG/ML
4 INJECTION, SOLUTION INTRAMUSCULAR; INTRAVENOUS
Status: COMPLETED | OUTPATIENT
Start: 2018-11-10 | End: 2018-11-10

## 2018-11-09 RX ADMIN — CLONIDINE HYDROCHLORIDE 0.2 MG: 0.1 TABLET ORAL at 22:19

## 2018-11-09 RX ADMIN — IOPAMIDOL 100 ML: 755 INJECTION, SOLUTION INTRAVENOUS at 22:49

## 2018-11-09 RX ADMIN — ONDANSETRON 4 MG: 4 TABLET, ORALLY DISINTEGRATING ORAL at 22:51

## 2018-11-09 RX ADMIN — KETOROLAC TROMETHAMINE 15 MG: 30 INJECTION, SOLUTION INTRAMUSCULAR at 22:51

## 2018-11-09 RX ADMIN — SODIUM CHLORIDE 1000 ML: 900 INJECTION, SOLUTION INTRAVENOUS at 21:56

## 2018-11-10 ENCOUNTER — HOSPITAL ENCOUNTER (INPATIENT)
Age: 43
LOS: 6 days | Discharge: HOME | DRG: 824 | End: 2018-11-16
Attending: HOSPITALIST | Admitting: HOSPITALIST
Payer: SUBSIDIZED

## 2018-11-10 VITALS
DIASTOLIC BLOOD PRESSURE: 83 MMHG | BODY MASS INDEX: 26.68 KG/M2 | SYSTOLIC BLOOD PRESSURE: 135 MMHG | HEART RATE: 78 BPM | WEIGHT: 170 LBS | OXYGEN SATURATION: 98 % | RESPIRATION RATE: 18 BRPM | TEMPERATURE: 97.7 F | HEIGHT: 67 IN

## 2018-11-10 DIAGNOSIS — R59.1 LYMPHADENOPATHY: ICD-10-CM

## 2018-11-10 DIAGNOSIS — R19.00 RETROPERITONEAL MASS: Primary | ICD-10-CM

## 2018-11-10 LAB
ATRIAL RATE: 94 BPM
CALCULATED P AXIS, ECG09: 32 DEGREES
CALCULATED R AXIS, ECG10: 33 DEGREES
CALCULATED T AXIS, ECG11: 39 DEGREES
DIAGNOSIS, 93000: NORMAL
P-R INTERVAL, ECG05: 122 MS
Q-T INTERVAL, ECG07: 366 MS
QRS DURATION, ECG06: 86 MS
QTC CALCULATION (BEZET), ECG08: 457 MS
VENTRICULAR RATE, ECG03: 94 BPM

## 2018-11-10 PROCEDURE — 84702 CHORIONIC GONADOTROPIN TEST: CPT

## 2018-11-10 PROCEDURE — 74011250636 HC RX REV CODE- 250/636: Performed by: HOSPITALIST

## 2018-11-10 PROCEDURE — 82105 ALPHA-FETOPROTEIN SERUM: CPT

## 2018-11-10 PROCEDURE — 74011250637 HC RX REV CODE- 250/637: Performed by: HOSPITALIST

## 2018-11-10 PROCEDURE — 96375 TX/PRO/DX INJ NEW DRUG ADDON: CPT

## 2018-11-10 PROCEDURE — 74011250636 HC RX REV CODE- 250/636: Performed by: EMERGENCY MEDICINE

## 2018-11-10 PROCEDURE — 65270000029 HC RM PRIVATE

## 2018-11-10 PROCEDURE — 96361 HYDRATE IV INFUSION ADD-ON: CPT

## 2018-11-10 PROCEDURE — 36415 COLL VENOUS BLD VENIPUNCTURE: CPT

## 2018-11-10 RX ORDER — SODIUM CHLORIDE 0.9 % (FLUSH) 0.9 %
5-10 SYRINGE (ML) INJECTION EVERY 8 HOURS
Status: DISCONTINUED | OUTPATIENT
Start: 2018-11-10 | End: 2018-11-16 | Stop reason: HOSPADM

## 2018-11-10 RX ORDER — LISINOPRIL 10 MG/1
10 TABLET ORAL DAILY
Status: DISCONTINUED | OUTPATIENT
Start: 2018-11-10 | End: 2018-11-16 | Stop reason: HOSPADM

## 2018-11-10 RX ORDER — HYDROMORPHONE HYDROCHLORIDE 2 MG/ML
1 INJECTION, SOLUTION INTRAMUSCULAR; INTRAVENOUS; SUBCUTANEOUS
Status: DISCONTINUED | OUTPATIENT
Start: 2018-11-10 | End: 2018-11-14

## 2018-11-10 RX ORDER — OXYCODONE HYDROCHLORIDE 5 MG/1
5 TABLET ORAL
Status: DISCONTINUED | OUTPATIENT
Start: 2018-11-10 | End: 2018-11-14

## 2018-11-10 RX ORDER — ACETAMINOPHEN 325 MG/1
650 TABLET ORAL
Status: DISCONTINUED | OUTPATIENT
Start: 2018-11-10 | End: 2018-11-13

## 2018-11-10 RX ORDER — AMOXICILLIN 250 MG
1 CAPSULE ORAL DAILY
Status: DISCONTINUED | OUTPATIENT
Start: 2018-11-10 | End: 2018-11-16 | Stop reason: HOSPADM

## 2018-11-10 RX ORDER — ASPIRIN 81 MG/1
81 TABLET ORAL DAILY
Status: DISCONTINUED | OUTPATIENT
Start: 2018-11-10 | End: 2018-11-11

## 2018-11-10 RX ORDER — IBUPROFEN 200 MG
1 TABLET ORAL DAILY
Status: DISCONTINUED | OUTPATIENT
Start: 2018-11-10 | End: 2018-11-16 | Stop reason: HOSPADM

## 2018-11-10 RX ORDER — SODIUM CHLORIDE 0.9 % (FLUSH) 0.9 %
5-10 SYRINGE (ML) INJECTION AS NEEDED
Status: DISCONTINUED | OUTPATIENT
Start: 2018-11-10 | End: 2018-11-16 | Stop reason: HOSPADM

## 2018-11-10 RX ORDER — ATORVASTATIN CALCIUM 10 MG/1
10 TABLET, FILM COATED ORAL DAILY
Status: DISCONTINUED | OUTPATIENT
Start: 2018-11-10 | End: 2018-11-16 | Stop reason: HOSPADM

## 2018-11-10 RX ORDER — ENOXAPARIN SODIUM 100 MG/ML
40 INJECTION SUBCUTANEOUS EVERY 24 HOURS
Status: DISCONTINUED | OUTPATIENT
Start: 2018-11-10 | End: 2018-11-16 | Stop reason: HOSPADM

## 2018-11-10 RX ORDER — HYDROCHLOROTHIAZIDE 25 MG/1
12.5 TABLET ORAL DAILY
Status: DISCONTINUED | OUTPATIENT
Start: 2018-11-10 | End: 2018-11-16 | Stop reason: HOSPADM

## 2018-11-10 RX ORDER — SODIUM CHLORIDE 9 MG/ML
75 INJECTION, SOLUTION INTRAVENOUS CONTINUOUS
Status: DISCONTINUED | OUTPATIENT
Start: 2018-11-10 | End: 2018-11-14

## 2018-11-10 RX ORDER — ONDANSETRON 2 MG/ML
4 INJECTION INTRAMUSCULAR; INTRAVENOUS
Status: DISCONTINUED | OUTPATIENT
Start: 2018-11-10 | End: 2018-11-13

## 2018-11-10 RX ADMIN — HYDROCHLOROTHIAZIDE 12.5 MG: 25 TABLET ORAL at 14:46

## 2018-11-10 RX ADMIN — OXYCODONE HYDROCHLORIDE 5 MG: 5 TABLET ORAL at 14:52

## 2018-11-10 RX ADMIN — ATORVASTATIN CALCIUM 10 MG: 10 TABLET, FILM COATED ORAL at 14:47

## 2018-11-10 RX ADMIN — ASPIRIN 81 MG: 81 TABLET, COATED ORAL at 14:47

## 2018-11-10 RX ADMIN — SODIUM CHLORIDE 1000 ML: 900 INJECTION, SOLUTION INTRAVENOUS at 00:15

## 2018-11-10 RX ADMIN — SENNOSIDES AND DOCUSATE SODIUM 1 TABLET: 8.6; 5 TABLET ORAL at 09:00

## 2018-11-10 RX ADMIN — HYDROMORPHONE HYDROCHLORIDE 1 MG: 2 INJECTION INTRAMUSCULAR; INTRAVENOUS; SUBCUTANEOUS at 17:55

## 2018-11-10 RX ADMIN — SODIUM CHLORIDE 100 ML/HR: 900 INJECTION, SOLUTION INTRAVENOUS at 13:59

## 2018-11-10 RX ADMIN — SODIUM CHLORIDE 100 ML/HR: 900 INJECTION, SOLUTION INTRAVENOUS at 23:37

## 2018-11-10 RX ADMIN — SODIUM CHLORIDE 100 ML/HR: 900 INJECTION, SOLUTION INTRAVENOUS at 03:45

## 2018-11-10 RX ADMIN — LISINOPRIL 10 MG: 10 TABLET ORAL at 14:46

## 2018-11-10 RX ADMIN — Medication 10 ML: at 14:51

## 2018-11-10 RX ADMIN — MORPHINE SULFATE 4 MG: 2 INJECTION, SOLUTION INTRAMUSCULAR; INTRAVENOUS at 00:16

## 2018-11-10 RX ADMIN — Medication 10 ML: at 03:45

## 2018-11-10 NOTE — PROGRESS NOTES
Bedside shift change report given to [address] (oncoming nurse) by Caron Rees (offgoing nurse). Report included the following information SBAR, MAR and Recent Results.

## 2018-11-10 NOTE — ED NOTES
AIDET communication provided and informed of purposeful rounding to include collaboration of entire care team; patient acknowledged understanding. IVF infusing without difficulty. Call bell within reach, will continue to monitor.

## 2018-11-10 NOTE — H&P
HISTORY AND PHYSICAL      PCP: None  History source: the patient      CC: back pain      HPI: 41 y.o man w/ HTN who presented to an outside ER with low back pain x 3 days. The pain is constant, radiates to the buttocks, without exacerbating or alleviating factors, associated w/ increased urination, no n/v/d, no dysuria, no fever, he's unsure about weight loss. Work-up there revealed a retroperitoneal mass and he was transferred here for further work-up. PMH/PSH:  Past Medical History:   Diagnosis Date    Anxiety     Hyperlipidemia     Hypertension      No past surgical history on file. Home meds:   Prior to Admission medications    Medication Sig Start Date End Date Taking? Authorizing Provider   LORazepam (ATIVAN) 0.5 mg tablet Take 0.5 mg by mouth. Yes Other, MD Diya   acetaminophen/diphenhydramine (TYLENOL PM PO) Take  by mouth. Yes Other, MD Diya   lisinopril (PRINIVIL, ZESTRIL) 10 mg tablet Take 1 Tab by mouth daily. 7/30/18  Yes Yonathan Herr MD   atorvastatin (LIPITOR) 10 mg tablet Take 1 Tab by mouth daily. 7/30/18  Yes Yonathan Herr MD   hydroCHLOROthiazide (HYDRODIURIL) 12.5 mg tablet Take 1 Tab by mouth daily. 7/30/18  Yes Yonathan Herr MD   aspirin delayed-release (ASPIR-81) 81 mg tablet Take 1 Tab by mouth daily. 7/30/18  Yes Yonathan Herr MD       Allergies:  No Known Allergies    FH:  Family History   Problem Relation Age of Onset    Hypertension Father        SH:  Social History     Tobacco Use    Smoking status: Current Every Day Smoker     Packs/day: 2.00     Years: 20.00     Pack years: 40.00    Smokeless tobacco: Never Used   Substance Use Topics    Alcohol use: No     Frequency: Never       ROS: A comprehensive review of systems was negative except for that written in the HPI.       PHYSICAL EXAM:  Visit Vitals  /68 (BP 1 Location: Right arm, BP Patient Position: At rest)   Pulse 70   Temp 97.7 °F (36.5 °C)   Resp 17   SpO2 97%       Gen: NAD, non-toxic  HEENT: anicteric sclerae, normal conjunctiva, oropharynx clear, MM moist  Neck: supple, trachea midline, no adenopathy  Heart: RRR, no MRG, no JVD, no peripheral edema  Lungs: CTA b/l, non-labored respirations   Abd: soft, NT, ND, BS+, no organomegaly  Extr: warm  Skin: dry, no rash  Neuro: CN II-XII grossly intact, normal speech, moves all extremities  Psych: normal mood, appropriate affect      Labs/Imaging:  Recent Results (from the past 24 hour(s))   EKG, 12 LEAD, INITIAL    Collection Time: 11/09/18  9:48 PM   Result Value Ref Range    Ventricular Rate 94 BPM    Atrial Rate 94 BPM    P-R Interval 122 ms    QRS Duration 86 ms    Q-T Interval 366 ms    QTC Calculation (Bezet) 457 ms    Calculated P Axis 32 degrees    Calculated R Axis 33 degrees    Calculated T Axis 39 degrees    Diagnosis       Normal sinus rhythm  Normal ECG  When compared with ECG of 30-JUL-2018 07:32,  T wave inversion no longer evident in Inferior leads     URINALYSIS W/ RFLX MICROSCOPIC    Collection Time: 11/09/18  9:49 PM   Result Value Ref Range    Color STRAW      Appearance CLEAR CLEAR      Specific gravity 1.016 1.003 - 1.030      pH (UA) 6.5 5.0 - 8.0      Protein NEGATIVE  NEG mg/dL    Glucose NEGATIVE  NEG mg/dL    Ketone NEGATIVE  NEG mg/dL    Bilirubin NEGATIVE  NEG      Blood NEGATIVE  NEG      Urobilinogen 0.2 0.2 - 1.0 EU/dL    Nitrites NEGATIVE  NEG      Leukocyte Esterase NEGATIVE  NEG      WBC 0-4 0 - 4 /hpf    RBC 0-5 0 - 5 /hpf    Epithelial cells FEW FEW /lpf    Bacteria NEGATIVE  NEG /hpf   METABOLIC PANEL, COMPREHENSIVE    Collection Time: 11/09/18  9:49 PM   Result Value Ref Range    Sodium 138 136 - 145 mmol/L    Potassium 3.6 3.5 - 5.1 mmol/L    Chloride 101 97 - 108 mmol/L    CO2 25 21 - 32 mmol/L    Anion gap 12 5 - 15 mmol/L    Glucose 98 65 - 100 mg/dL    BUN 8 6 - 20 MG/DL    Creatinine 1.17 0.70 - 1.30 MG/DL    BUN/Creatinine ratio 7 (L) 12 - 20      GFR est AA >60 >60 ml/min/1.73m2    GFR est non-AA >60 >60 ml/min/1.73m2    Calcium 8.9 8.5 - 10.1 MG/DL    Bilirubin, total 0.2 0.2 - 1.0 MG/DL    ALT (SGPT) 14 12 - 78 U/L    AST (SGOT) 10 (L) 15 - 37 U/L    Alk. phosphatase 98 45 - 117 U/L    Protein, total 7.1 6.4 - 8.2 g/dL    Albumin 3.3 (L) 3.5 - 5.0 g/dL    Globulin 3.8 2.0 - 4.0 g/dL    A-G Ratio 0.9 (L) 1.1 - 2.2     CBC WITH AUTOMATED DIFF    Collection Time: 11/09/18  9:49 PM   Result Value Ref Range    WBC 10.6 4.1 - 11.1 K/uL    RBC 4.34 4.10 - 5.70 M/uL    HGB 13.5 12.1 - 17.0 g/dL    HCT 39.8 36.6 - 50.3 %    MCV 91.7 80.0 - 99.0 FL    MCH 31.1 26.0 - 34.0 PG    MCHC 33.9 30.0 - 36.5 g/dL    RDW 14.8 (H) 11.5 - 14.5 %    PLATELET 950 036 - 622 K/uL    MPV 10.8 8.9 - 12.9 FL    NEUTROPHILS 75 32 - 75 %    LYMPHOCYTES 14 12 - 49 %    MONOCYTES 9 5 - 13 %    EOSINOPHILS 2 0 - 7 %    BASOPHILS 0 0 - 1 %    ABS. NEUTROPHILS 7.9 1.8 - 8.0 K/UL    ABS. LYMPHOCYTES 1.5 K/UL    ABS. MONOCYTES 0.9 0.0 - 1.0 K/UL    ABS. EOSINOPHILS 0.2 0.0 - 0.4 K/UL    ABS. BASOPHILS 0.0 0.0 - 0.1 K/UL    DF AUTOMATED      XXWBCSUS 0     TROPONIN I    Collection Time: 11/09/18  9:49 PM   Result Value Ref Range    Troponin-I, Qt. <0.05 <0.05 ng/mL   PROTHROMBIN TIME + INR    Collection Time: 11/09/18  9:49 PM   Result Value Ref Range    INR 1.0 0.9 - 1.1      Prothrombin time 9.5 9.0 - 11.1 sec   LIPASE    Collection Time: 11/09/18  9:49 PM   Result Value Ref Range    Lipase 198 73 - 393 U/L       Recent Labs     11/09/18 2149   WBC 10.6   HGB 13.5   HCT 39.8        Recent Labs     11/09/18 2149      K 3.6      CO2 25   BUN 8   CREA 1.17   GLU 98   CA 8.9     Recent Labs     11/09/18 2149   SGOT 10*   ALT 14   AP 98   TBILI 0.2   TP 7.1   ALB 3.3*   GLOB 3.8   LPSE 198       Recent Labs     11/09/18 2149   TROIQ <0.05       Recent Labs     11/09/18 2149   INR 1.0   PTP 9.5        No results for input(s): PH, PCO2, PO2 in the last 72 hours.       Xr Spine Lumb 2 Or 3 V    Result Date: 11/9/2018  IMPRESSION: No acute bony abnormality of the lumbar spine. Ct Abd W Cont And Pelvis W Wo Cont    Result Date: 11/9/2018  IMPRESSION: 1. Interval development of a large retroperitoneal mass encircling the aorta with invasion of the left renal hilum and left adrenal gland. Several adjacent lymph nodes are seen extending into the peritoneum and underneath the diaphragmatic natalie. This most likely represents lymphoma. 2. Several new bilateral enlarged inguinal lymph nodes also likely representing lymphoma. 3. Moderate left hydronephrosis with a delayed renal nephrogram related to decreased renal function. This is related to the invasion of the renal hilum.  The case was discussed with Dr. Shukri Ellis at 11:15 PM on 11/9/2018           Assessment & Plan:     Retroperitoneal mass:   -check LDH, AFP, beta-HCG; d/w Dr. Dafne Trujillo w/ oncology  -avoid steroids given possible lymphoma  -pain control w/ IV hydromorphone PRN and PO meds  -needs CT of the neck and chest w/ IV contrast; since he already received IV contrast will provide IV hydration after which the scans can be ordered    HTN:  -continue home meds  -improved with better pain control    L hydronephrosis:  -consult urology    Tobacco use:  -provide nicotine patch  - on cessation prior to d/c    DVT ppx: sq Lovenox  Code status: full  Disposition: TBD    Signed By: Jameson Castro MD     November 10, 2018

## 2018-11-10 NOTE — PROGRESS NOTES
Primary Nurse Chioma CABALLERO and Dolly Torres RN performed a dual skin assessment on this patient No impairment noted  Asim score is 21

## 2018-11-10 NOTE — PROGRESS NOTES
Hospitalist Progress Note  Donia Bloch, MD  Answering service: [phone] from in house phone  Cell: [phone]      Date of Service:  11/10/2018  NAME:  Jennifer Brown  :  1975  MRN:  [id number]      Admission Summary:   A 41 y.o man with HTN who presented to an outside ER with low back pain x 3 days. The pain is constant, radiates to the buttocks, without exacerbating or alleviating factors, associated with increased urination, no nausea, vomiting or diarrhea. No dysuria or fever. He's unsure about weight loss. Work-up there revealed a retroperitoneal mass and he was transferred here for further work-up. Interval history / Subjective:   He said he feels the same, no chest pain or shortness of breath     Assessment & Plan:     Retroperitoneal mass possible lymphoma  -CT abdomen interval development of a large retroperitoneal mass encircling the aorta  with invasion of the left renal hilum and left adrenal gland. Several adjacent lymph nodes are seen extending into the peritoneum and underneath the diaphragmatic natalie. This most likely represents lymphoma, several new bilateral enlarged inguinal lymph nodes   -Hem/Onc is consulted    Left hydronephrosis   -creatinine improving  -CT showed moderate left hydronephrosis with a delayed renal nephrogram related to  decreased renal function.  This is related to the invasion of the renal hilum  -monitor renal function    HTN  -on lisinopril, hydrochlorothiazide and monitor BP    Tobacco abuse  -on nicotine  -advised to stop smoking    Code status: Full Code  DVT prophylaxis: Lovenox    Care Plan discussed with: Patient/Family and Nurse  Disposition: TBD     Hospital Problems  Date Reviewed: 2016          Codes Class Noted POA    * (Principal) Retroperitoneal mass ICD-10-CM: R19.00  ICD-9-CM: 789.30  11/10/2018 Unknown              Vital Signs:    Last 24hrs VS reviewed since prior progress note. Most recent are:  Visit Vitals  /68 (BP 1 Location: Right arm, BP Patient Position: At rest)   Pulse 70   Temp 97.7 °F (36.5 °C)   Resp 17   SpO2 97%       No intake or output data in the 24 hours ending 11/10/18 0738     Physical Examination:             Constitutional:  No acute distress, cooperative, pleasant    ENT:  Oral mucous moist, oropharynx benign. Neck supple,    Resp:  CTA bilaterally. No wheezing/rhonchi/rales. No accessory muscle use   CV:  Regular rhythm, normal rate, no murmurs, gallops, rubs    GI:  Soft, non distended, non tender. normoactive bowel sounds, no hepatosplenomegaly     Musculoskeletal:  No edema    Neurologic:  Moves all extremities. AAOx3, CN II-XII reviewed     Skin:  Good turgor, no rashes or ulcers       Data Review:    Review and/or order of clinical lab test      Labs:     Recent Labs     11/09/18 2149   WBC 10.6   HGB 13.5   HCT 39.8        Recent Labs     11/09/18 2149      K 3.6      CO2 25   BUN 8   CREA 1.17   GLU 98   CA 8.9     Recent Labs     11/09/18 2149   SGOT 10*   ALT 14   AP 98   TBILI 0.2   TP 7.1   ALB 3.3*   GLOB 3.8   LPSE 198     Recent Labs     11/09/18 2149   INR 1.0   PTP 9.5      No results for input(s): FE, TIBC, PSAT, FERR in the last 72 hours. No results found for: FOL, RBCF   No results for input(s): PH, PCO2, PO2 in the last 72 hours.   Recent Labs     11/09/18 2149   TROIQ <0.05     Lab Results   Component Value Date/Time    Cholesterol, total 170 09/28/2016 04:40 AM    HDL Cholesterol 23 09/28/2016 04:40 AM    LDL, calculated 96.4 09/28/2016 04:40 AM    Triglyceride 253 (H) 09/28/2016 04:40 AM    CHOL/HDL Ratio 7.4 (H) 09/28/2016 04:40 AM     Lab Results   Component Value Date/Time    Glucose (POC) 98 06/05/2009 02:13 PM     Lab Results   Component Value Date/Time    Color STRAW 11/09/2018 09:49 PM    Appearance CLEAR 11/09/2018 09:49 PM    Specific gravity 1.016 11/09/2018 09:49 PM    Specific gravity 1.010 01/22/2017 11:20 AM    pH (UA) 6.5 11/09/2018 09:49 PM    Protein NEGATIVE  11/09/2018 09:49 PM    Glucose NEGATIVE  11/09/2018 09:49 PM    Ketone NEGATIVE  11/09/2018 09:49 PM    Bilirubin NEGATIVE  11/09/2018 09:49 PM    Urobilinogen 0.2 11/09/2018 09:49 PM    Nitrites NEGATIVE  11/09/2018 09:49 PM    Leukocyte Esterase NEGATIVE  11/09/2018 09:49 PM    Epithelial cells FEW 11/09/2018 09:49 PM    Bacteria NEGATIVE  11/09/2018 09:49 PM    WBC 0-4 11/09/2018 09:49 PM    RBC 0-5 11/09/2018 09:49 PM         Medications Reviewed:     Current Facility-Administered Medications   Medication Dose Route Frequency    sodium chloride (NS) flush 5-10 mL  5-10 mL IntraVENous Q8H    sodium chloride (NS) flush 5-10 mL  5-10 mL IntraVENous PRN    acetaminophen (TYLENOL) tablet 650 mg  650 mg Oral Q4H PRN    HYDROmorphone (DILAUDID) injection 1 mg  1 mg IntraVENous Q4H PRN    ondansetron (ZOFRAN) injection 4 mg  4 mg IntraVENous Q4H PRN    enoxaparin (LOVENOX) injection 40 mg  40 mg SubCUTAneous Q24H    oxyCODONE IR (ROXICODONE) tablet 5 mg  5 mg Oral Q4H PRN    senna-docusate (PERICOLACE) 8.6-50 mg per tablet 1 Tab  1 Tab Oral DAILY    nicotine (NICODERM CQ) 14 mg/24 hr patch 1 Patch  1 Patch TransDERmal DAILY    aspirin delayed-release tablet 81 mg  81 mg Oral DAILY    atorvastatin (LIPITOR) tablet 10 mg  10 mg Oral DAILY    hydroCHLOROthiazide (HYDRODIURIL) tablet 12.5 mg  12.5 mg Oral DAILY    lisinopril (PRINIVIL, ZESTRIL) tablet 10 mg  10 mg Oral DAILY    0.9% sodium chloride infusion  100 mL/hr IntraVENous CONTINUOUS     ______________________________________________________________________  EXPECTED LENGTH OF STAY: - - -  ACTUAL LENGTH OF STAY:          0                 Itzel Sandoval MD

## 2018-11-10 NOTE — CONSULTS
[address]    Homer De La Torre  MR#: [id number]  : 1975  ACCOUNT #: [de-identified]   DATE OF SERVICE: 11/10/2018    REQUESTING PHYSICIAN:  Dr. Ginny Michaels:  Retroperitoneal mass and lymphadenopathy. HISTORY OF PRESENT ILLNESS:  The patient is a 26-year-old who presented to the emergency room with complaints of a 3-day history of low back pain. He had a CT scan of the abdomen and pelvis which showed a large retroperitoneal mass encircling the aorta with invasion of the left renal hilum and left adrenal gland. There were bilateral inguinal lymph nodes and moderate left hydronephrosis. I am asked to see him regarding his retroperitoneal mass. Urology has been consulted secondary to his hydronephrosis. He reports that prior to 3 days ago he was in his usual health with no complaints. On further questioning, he does note that he has had palpable nodes in his groin for approximately the past 1 month. He denies any testicular mass, anorexia, weight loss, fevers, night sweats, chest pain, shortness of breath, abdominal pain or nausea. PAST MEDICAL HISTORY:  1. Hypertension. 2.  Hyperlipidemia. MEDICATIONS:  Prior to admission, lisinopril, Lipitor, hydrochlorothiazide, aspirin 81 mg daily, Ativan. SOCIAL HISTORY:  Smoked 2 packs per day for 20 years and denies alcohol use. FAMILY HISTORY:  His father has a history of leukemia. REVIEW OF SYSTEMS:  A 10-system review of systems was performed and notable for those symptoms mentioned in the HPI. Remainder of review of systems was negative. PHYSICAL EXAMINATION:  GENERAL:  He is a well-developed male in no acute distress. HEENT:  Anicteric sclerae. Oropharynx moist mucosa, no lesions. NECK:  Supple. No palpable cervical or supraclavicular adenopathy. LUNGS:  Clear to auscultation bilaterally. HEART:  Regular rhythm. No murmurs. ABDOMEN:  Soft, nontender.   No palpable hepatosplenomegaly. LYMPHATICS:  Bilateral palpable inguinal adenopathy. EXTREMITIES:  No clubbing, cyanosis or edema. SKIN:  No rash or petechiae. NEUROLOGIC:  Alert and oriented x3, grossly nonfocal.    LABORATORY DATA:  White blood count 10.6, hemoglobin 13.5, platelets 888,489. BUN 8, creatinine 1.17, calcium 8.9, total bilirubin 0.2, ALT 14, AST 10, alkaline phosphatase 98. ASSESSMENT AND PLAN:  Retroperitoneal mass and lymphadenopathy. We will need a biopsy to confirm the diagnosis. Would recommend likely proceeding with excisional biopsy of one of his inguinal lymph nodes. Will check a lactate dehydrogenase, alpha fetoprotein and beta hCG. He will need CT scans of the chest to complete his staging.       MD SAVANNAH Osborne / [place]  D: 11/10/2018 12:52     T: 11/10/2018 13:14  JOB #: [id number]

## 2018-11-10 NOTE — ED NOTES
bedside shift change report given to Meghna Gustafson and Maranda Rosales, student nurse by April, ADA.  Report included the following information, SBAR

## 2018-11-10 NOTE — ED NOTES
Bedside shift change report given to Ashley Sultana RN and Ila DAVIDSON (oncoming nurse) by Mary Alice Yang RN(offgoing nurse). Report included the following information SBAR, Kardex, ED Summary, [facility] and Recent Results.

## 2018-11-10 NOTE — ED PROVIDER NOTES
'stopped taking my BP meds 1 month ago(ran out)/ still smoking/ Low back pains shooting into my L buttock/ increased urination x 3 days/ 'friends told me it could be a kidney infection so I came to get checked out'; pt denies HA, vison changes, diff swallowing, CP, SOB, Abd pain, F/Ch, N/V, D/Cons or other current systemic complaints             Past Medical History:   Diagnosis Date    Anxiety     Hyperlipidemia     Hypertension        History reviewed. No pertinent surgical history. Family History:   Problem Relation Age of Onset    Hypertension Father        Social History     Socioeconomic History    Marital status: SINGLE     Spouse name: Not on file    Number of children: Not on file    Years of education: Not on file    Highest education level: Not on file   Social Needs    Financial resource strain: Not on file    Food insecurity - worry: Not on file    Food insecurity - inability: Not on file    Transportation needs - medical: Not on file   [employer] needs - non-medical: Not on file   Occupational History    Not on file   Tobacco Use    Smoking status: Current Every Day Smoker     Packs/day: 2.00     Years: 20.00     Pack years: 40.00    Smokeless tobacco: Never Used   Substance and Sexual Activity    Alcohol use: No     Frequency: Never    Drug use: No    Sexual activity: Not on file   Other Topics Concern    Not on file   Social History Narrative    Not on file         ALLERGIES: Patient has no known allergies. Review of Systems   Constitutional: Negative for appetite change, chills and fever. HENT: Negative for trouble swallowing and voice change. Eyes: Negative for photophobia and visual disturbance. Respiratory: Positive for cough. Negative for chest tightness, shortness of breath, wheezing and stridor. Cardiovascular: Negative for chest pain, palpitations and leg swelling. Gastrointestinal: Negative for abdominal pain, constipation, diarrhea and vomiting. Genitourinary: Positive for frequency. Negative for difficulty urinating, discharge, hematuria, penile pain, scrotal swelling and testicular pain. Musculoskeletal: Positive for arthralgias, back pain and myalgias. Negative for neck pain and neck stiffness. Skin: Negative for rash. Neurological: Negative for facial asymmetry, speech difficulty and weakness. All other systems reviewed and are negative. Vitals:    11/09/18 2130   BP: (!) 195/123   Pulse: 89   Resp: 16   Temp: 98.4 °F (36.9 °C)   SpO2: 97%   Weight: 77.1 kg (170 lb)   Height: 5' 7\" (1.702 m)            Physical Exam   Constitutional: He is oriented to person, place, and time. He appears well-developed and well-nourished. No distress. NAD, AxOx4, speaking in complete sentences     HENT:   Head: Normocephalic and atraumatic. Mouth/Throat: Oropharynx is clear and moist. No oropharyngeal exudate. Eyes: Conjunctivae and EOM are normal. Pupils are equal, round, and reactive to light. Right eye exhibits no discharge. Left eye exhibits no discharge. Neck: Normal range of motion. Neck supple. Cardiovascular: Normal rate, regular rhythm and intact distal pulses. Exam reveals no gallop and no friction rub. No murmur heard. Pulmonary/Chest: Effort normal and breath sounds normal. No respiratory distress. He has no wheezes. He has no rales. He exhibits no tenderness. Abdominal: Soft. Bowel sounds are normal. He exhibits no distension and no mass. There is no tenderness. There is no rebound and no guarding. nttp     Genitourinary:   Genitourinary Comments: Pt denies urinary/ Testicular/ scrotal or penile  complaints   Musculoskeletal: Normal range of motion. He exhibits no edema, tenderness or deformity. Lymphadenopathy:     He has no cervical adenopathy. Neurological: He is alert and oriented to person, place, and time. No cranial nerve deficit.  Coordination normal.   pt has motor/ CV/ Sensation grossly intact to all extremities, R = L in strength;   Skin: Skin is warm and dry. No rash noted. No erythema. Psychiatric: He has a normal mood and affect. Nursing note and vitals reviewed. MDM  Number of Diagnoses or Management Options  Noncompliance with medication regimen:   Retroperitoneal mass:   Secondary hypertension:   Smoking:   Risk of Complications, Morbidity, and/or Mortality  Presenting problems: high  Diagnostic procedures: high  Management options: high    Critical Care  Total time providing critical care: 30-74 minutes (45 min)         Procedures    Chief Complaint   Patient presents with    Back Pain    Urinary Frequency       10:04 PM  The patients presenting problems have been discussed, and they are in agreement with the care plan formulated and outlined with them. I have encouraged them to ask questions as they arise throughout their visit. MEDICATIONS GIVEN:  Medications   0.9% sodium chloride infusion 1,000 mL (1,000 mL IntraVENous New Bag 11/9/18 6449)       LABS REVIEWED:  Labs Reviewed   CBC WITH AUTOMATED DIFF - Abnormal; Notable for the following components:       Result Value    RDW 14.8 (*)     All other components within normal limits   METABOLIC PANEL, COMPREHENSIVE   TROPONIN I   PROTHROMBIN TIME + INR   LIPASE   URINALYSIS W/ RFLX MICROSCOPIC       RADIOLOGY RESULTS:  The following have been ordered and reviewed:  _____________________________________________________________________  _____________________________________________________________________    EKG interpretation:   Rhythm: normal sinus rhythm; and regular . Rate (approx.): 94; Axis: normal; P wave: normal; QRS interval: normal ; ST/T wave: normal; Negative acute significant segmental elevations/ unchanged compared to study dated 07/30/2018    PROCEDURES:        CONSULTATIONS:       PROGRESS NOTES:      DIAGNOSIS:    1. Lymphoma of retroperitoneum ([address]    2. Secondary hypertension    3. Smoking    4.  Noncompliance with medication regimen PLAN:  1- large retroperitoneal mass/ L renal Invasion / Pt requesting [facility] for admission      ED COURSE: The patients hospital course has been uncomplicated. 11:11 PM  'doing ok'; awaiting CT results/ agrees w/ bladder scan;        CONSULT  NOTE  11:55 PM  Ellis Correa MD spoke with Dr Ally Esteves. Specialty: Heme-Onc  Discussed pt's hx, disposition, and available diagnostic and imaging results. Reviewed care plans. Consulting physician agrees with plans as outlined 'Please admit to hospitalist/ we will see him'. 12:04 AM  Patient is being evaluated for transfer/ admission to the hospital by the hospitalist, Dr Chauncey Matthews . The results of their tests and reasons for their admission have been discussed with them and/or available family. They convey agreement and understanding for the need to be admitted and for their admission diagnosis. Consultation has been made with the inpatient physician specialist for hospitalization.

## 2018-11-10 NOTE — ED NOTES
Pt report and update given to Kaia Ba RN and [facility]. Ematal, summary, encounter, and facesheet given to [facility]. Pt travels with normal saline infusing via 20g in left AC. Pt will leave his vehicle in the [facility] parking lot and security has been notified. Discharge or Transfer Assessment: Patient A&O x4 and in no distress. Physical re-examination reveals  improvement in pt's condition with reassessment of vital signs completed at the time of admission transfer and/or discharge.

## 2018-11-10 NOTE — ROUTINE PROCESS
TRANSFER - OUT REPORT:    Verbal report given to Tavo Gandhi rn on Deepthi Mendosa  being transferred to [facility],  for routine progression of care       Report consisted of patients Situation, Background, Assessment and   Recommendations(SBAR). Information from the following report(s) SBAR, Kardex, ED Summary, [facility] and Recent Results was reviewed with the receiving nurse. Lines:   Peripheral IV 11/09/18 Left Antecubital (Active)   Site Assessment Clean, dry, & intact 11/9/2018  9:54 PM   Phlebitis Assessment 0 11/9/2018  9:54 PM   Dressing Status Clean, dry, & intact 11/9/2018  9:54 PM   Dressing Type Transparent;Tape 11/9/2018  9:54 PM   Hub Color/Line Status Pink 11/9/2018  9:54 PM        Opportunity for questions and clarification was provided.       Patient transported with:   Monitor, 20 g in left AC with normal saline infusing to gravity

## 2018-11-10 NOTE — PROGRESS NOTES
TRANSFER - IN REPORT:    Verbal report received from Eduardo Goldberg RN (name) on AVOB   being received from [facility] ED (unit) for routine progression of care      Report consisted of patients Situation, Background, Assessment and   Recommendations(SBAR). Information from the following report(s) SBAR and MAR was reviewed with the receiving nurse. Opportunity for questions and clarification was provided. 0100 am:  TC to Dr. Clarence Evans, regarding patient's diagnosis, elevated BP, and the fact that [facility] is not a telemetry unit. Per Dr. Berry Cruz, patient does not need telemetry at this time.

## 2018-11-10 NOTE — ED NOTES
Pt was sleeping when I entered the room, I woke him to tell him that the ambulance should be here within the hour. Call bell in reach.

## 2018-11-10 NOTE — ED TRIAGE NOTES
Lower back pain started Wednesday, but now has wrapped around to abdomen and \"buttcheek. \" pt drove himself here and ambulated to room. Urinating more than normal past couple days. BM this am and normal. No numbness or tingling and no loss of control of bowel or bladder.  Hx of HTN, but not taking anything for it for past month

## 2018-11-11 ENCOUNTER — APPOINTMENT (OUTPATIENT)
Dept: CT IMAGING | Age: 43
DRG: 824 | End: 2018-11-11
Attending: INTERNAL MEDICINE
Payer: SUBSIDIZED

## 2018-11-11 LAB
AFP-TM SERPL-MCNC: 4.1 NG/ML (ref 0–8.3)
ALBUMIN SERPL-MCNC: 2.7 G/DL (ref 3.5–5)
ALBUMIN/GLOB SERPL: 0.8 {RATIO} (ref 1.1–2.2)
ALP SERPL-CCNC: 81 U/L (ref 45–117)
ALT SERPL-CCNC: 12 U/L (ref 12–78)
ANION GAP SERPL CALC-SCNC: 9 MMOL/L (ref 5–15)
AST SERPL-CCNC: 7 U/L (ref 15–37)
BILIRUB SERPL-MCNC: 0.2 MG/DL (ref 0.2–1)
BUN SERPL-MCNC: 11 MG/DL (ref 6–20)
BUN/CREAT SERPL: 12 (ref 12–20)
CALCIUM SERPL-MCNC: 8.5 MG/DL (ref 8.5–10.1)
CHLORIDE SERPL-SCNC: 110 MMOL/L (ref 97–108)
CO2 SERPL-SCNC: 24 MMOL/L (ref 21–32)
CREAT SERPL-MCNC: 0.91 MG/DL (ref 0.7–1.3)
ERYTHROCYTE [DISTWIDTH] IN BLOOD BY AUTOMATED COUNT: 14.6 % (ref 11.5–14.5)
GLOBULIN SER CALC-MCNC: 3.4 G/DL (ref 2–4)
GLUCOSE SERPL-MCNC: 97 MG/DL (ref 65–100)
HCG INTACT+B SERPL-ACNC: <1 MIU/ML (ref 0–3)
HCT VFR BLD AUTO: 37.1 % (ref 36.6–50.3)
HGB BLD-MCNC: 12.2 G/DL (ref 12.1–17)
LDH SERPL L TO P-CCNC: 118 U/L (ref 85–241)
MCH RBC QN AUTO: 30.7 PG (ref 26–34)
MCHC RBC AUTO-ENTMCNC: 32.9 G/DL (ref 30–36.5)
MCV RBC AUTO: 93.5 FL (ref 80–99)
NRBC # BLD: 0 K/UL (ref 0–0.01)
NRBC BLD-RTO: 0 PER 100 WBC
PLATELET # BLD AUTO: 226 K/UL (ref 150–400)
PMV BLD AUTO: 10.8 FL (ref 8.9–12.9)
POTASSIUM SERPL-SCNC: 4 MMOL/L (ref 3.5–5.1)
PROT SERPL-MCNC: 6.1 G/DL (ref 6.4–8.2)
RBC # BLD AUTO: 3.97 M/UL (ref 4.1–5.7)
SODIUM SERPL-SCNC: 143 MMOL/L (ref 136–145)
WBC # BLD AUTO: 6.9 K/UL (ref 4.1–11.1)

## 2018-11-11 PROCEDURE — 36415 COLL VENOUS BLD VENIPUNCTURE: CPT

## 2018-11-11 PROCEDURE — 74011250636 HC RX REV CODE- 250/636: Performed by: HOSPITALIST

## 2018-11-11 PROCEDURE — 80053 COMPREHEN METABOLIC PANEL: CPT

## 2018-11-11 PROCEDURE — 74011250637 HC RX REV CODE- 250/637: Performed by: HOSPITALIST

## 2018-11-11 PROCEDURE — 74011636320 HC RX REV CODE- 636/320: Performed by: RADIOLOGY

## 2018-11-11 PROCEDURE — 83615 LACTATE (LD) (LDH) ENZYME: CPT

## 2018-11-11 PROCEDURE — 65270000029 HC RM PRIVATE

## 2018-11-11 PROCEDURE — 85027 COMPLETE CBC AUTOMATED: CPT

## 2018-11-11 PROCEDURE — 71260 CT THORAX DX C+: CPT

## 2018-11-11 RX ORDER — SODIUM CHLORIDE 0.9 % (FLUSH) 0.9 %
10 SYRINGE (ML) INJECTION
Status: ACTIVE | OUTPATIENT
Start: 2018-11-11 | End: 2018-11-12

## 2018-11-11 RX ADMIN — SENNOSIDES AND DOCUSATE SODIUM 1 TABLET: 8.6; 5 TABLET ORAL at 08:25

## 2018-11-11 RX ADMIN — SODIUM CHLORIDE 75 ML/HR: 900 INJECTION, SOLUTION INTRAVENOUS at 20:18

## 2018-11-11 RX ADMIN — ASPIRIN 81 MG: 81 TABLET, COATED ORAL at 08:13

## 2018-11-11 RX ADMIN — ONDANSETRON 4 MG: 2 INJECTION, SOLUTION INTRAMUSCULAR; INTRAVENOUS at 16:09

## 2018-11-11 RX ADMIN — LISINOPRIL 10 MG: 10 TABLET ORAL at 08:12

## 2018-11-11 RX ADMIN — Medication 10 ML: at 11:30

## 2018-11-11 RX ADMIN — OXYCODONE HYDROCHLORIDE 5 MG: 5 TABLET ORAL at 08:25

## 2018-11-11 RX ADMIN — SODIUM CHLORIDE 100 ML/HR: 900 INJECTION, SOLUTION INTRAVENOUS at 09:23

## 2018-11-11 RX ADMIN — OXYCODONE HYDROCHLORIDE 5 MG: 5 TABLET ORAL at 18:31

## 2018-11-11 RX ADMIN — HYDROCHLOROTHIAZIDE 12.5 MG: 25 TABLET ORAL at 08:12

## 2018-11-11 RX ADMIN — ATORVASTATIN CALCIUM 10 MG: 10 TABLET, FILM COATED ORAL at 08:13

## 2018-11-11 RX ADMIN — OXYCODONE HYDROCHLORIDE 5 MG: 5 TABLET ORAL at 01:54

## 2018-11-11 RX ADMIN — OXYCODONE HYDROCHLORIDE 5 MG: 5 TABLET ORAL at 23:32

## 2018-11-11 RX ADMIN — Medication 10 ML: at 05:58

## 2018-11-11 RX ADMIN — IOPAMIDOL 100 ML: 612 INJECTION, SOLUTION INTRAVENOUS at 18:00

## 2018-11-11 RX ADMIN — ONDANSETRON 4 MG: 2 INJECTION, SOLUTION INTRAMUSCULAR; INTRAVENOUS at 11:29

## 2018-11-11 NOTE — PROGRESS NOTES
Hospitalist Progress Note  Emeli Heath MD  Answering service: [phone] from in house phone  Cell: [phone]      Date of Service:  2018  NAME:  Juancarlos Clayton  :  1975  MRN:  [id number]      Admission Summary:   A 41 y.o man with HTN who presented to an outside ER with low back pain x 3 days. The pain is constant, radiates to the buttocks, without exacerbating or alleviating factors, associated with increased urination, no nausea, vomiting or diarrhea. No dysuria or fever. He's unsure about weight loss. Work-up there revealed a retroperitoneal mass and he was transferred here for further work-up. Interval history / Subjective:   He said he feels the same, no chest pain or shortness of breath     Assessment & Plan:     Retroperitoneal mass possible lymphoma  -CT abdomen interval development of a large retroperitoneal mass encircling the aorta  with invasion of the left renal hilum and left adrenal gland. Several adjacent lymph nodes are seen extending into the peritoneum and underneath the diaphragmatic natalie. This most likely represents lymphoma, several new bilateral enlarged inguinal lymph nodes   -Hem/Onc on board  -Urologist is consulted    Left hydronephrosis   -creatinine improved, cut back normal saline at 75 ml/hr  -CT showed moderate left hydronephrosis with a delayed renal nephrogram related to  decreased renal function.  This is related to the invasion of the renal hilum  -monitor renal function    HTN  -BP normal, continue lisinopril, hydrochlorothiazide and monitor BP    Tobacco abuse  -on nicotine  -advised to stop smoking    Code status: Full Code  DVT prophylaxis: Lovenox    Care Plan discussed with: Patient/Family and Nurse  Disposition: TBD     Hospital Problems  Date Reviewed: 2016          Codes Class Noted POA    * (Principal) Retroperitoneal mass ICD-10-CM: R19.00  ICD-9-CM: 789.30 11/10/2018 Unknown              Vital Signs:    Last 24hrs VS reviewed since prior progress note. Most recent are:  Visit Vitals  /89   Pulse 71   Temp 98.7 °F (37.1 °C)   Resp 14   SpO2 98%         Intake/Output Summary (Last 24 hours) at 11/11/2018 1024  Last data filed at 11/10/2018 2337  Gross per 24 hour   Intake 2099 ml   Output --   Net 2099 ml        Physical Examination:             Constitutional:  No acute distress, cooperative, pleasant    ENT:  Oral mucous moist, oropharynx benign. Neck supple,    Resp:  CTA bilaterally. No wheezing/rhonchi/rales. No accessory muscle use   CV:  Regular rhythm, normal rate, no murmurs, gallops, rubs    GI:  Soft, non distended, non tender. normoactive bowel sounds, no hepatosplenomegaly     Musculoskeletal:  No edema    Neurologic:  Moves all extremities. AAOx3, CN II-XII reviewed     Skin:  Good turgor, no rashes or ulcers       Data Review:    Review and/or order of clinical lab test      Labs:     Recent Labs     11/11/18 0150 11/09/18 2149   WBC 6.9 10.6   HGB 12.2 13.5   HCT 37.1 39.8    260     Recent Labs     11/11/18 0150 11/09/18 2149    138   K 4.0 3.6   * 101   CO2 24 25   BUN 11 8   CREA 0.91 1.17   GLU 97 98   CA 8.5 8.9     Recent Labs     11/11/18 0150 11/09/18 2149   SGOT 7* 10*   ALT 12 14   AP 81 98   TBILI 0.2 0.2   TP 6.1* 7.1   ALB 2.7* 3.3*   GLOB 3.4 3.8   LPSE  --  198     Recent Labs     11/09/18  2149   INR 1.0   PTP 9.5      No results for input(s): FE, TIBC, PSAT, FERR in the last 72 hours. No results found for: FOL, RBCF   No results for input(s): PH, PCO2, PO2 in the last 72 hours.   Recent Labs     11/09/18 2149   TROIQ <0.05     Lab Results   Component Value Date/Time    Cholesterol, total 170 09/28/2016 04:40 AM    HDL Cholesterol 23 09/28/2016 04:40 AM    LDL, calculated 96.4 09/28/2016 04:40 AM    Triglyceride 253 (H) 09/28/2016 04:40 AM    CHOL/HDL Ratio 7.4 (H) 09/28/2016 04:40 AM     Lab Results Component Value Date/Time    Glucose (POC) 98 06/05/2009 02:13 PM     Lab Results   Component Value Date/Time    Color STRAW 11/09/2018 09:49 PM    Appearance CLEAR 11/09/2018 09:49 PM    Specific gravity 1.016 11/09/2018 09:49 PM    Specific gravity 1.010 01/22/2017 11:20 AM    pH (UA) 6.5 11/09/2018 09:49 PM    Protein NEGATIVE  11/09/2018 09:49 PM    Glucose NEGATIVE  11/09/2018 09:49 PM    Ketone NEGATIVE  11/09/2018 09:49 PM    Bilirubin NEGATIVE  11/09/2018 09:49 PM    Urobilinogen 0.2 11/09/2018 09:49 PM    Nitrites NEGATIVE  11/09/2018 09:49 PM    Leukocyte Esterase NEGATIVE  11/09/2018 09:49 PM    Epithelial cells FEW 11/09/2018 09:49 PM    Bacteria NEGATIVE  11/09/2018 09:49 PM    WBC 0-4 11/09/2018 09:49 PM    RBC 0-5 11/09/2018 09:49 PM         Medications Reviewed:     Current Facility-Administered Medications   Medication Dose Route Frequency    sodium chloride (NS) flush 5-10 mL  5-10 mL IntraVENous Q8H    sodium chloride (NS) flush 5-10 mL  5-10 mL IntraVENous PRN    acetaminophen (TYLENOL) tablet 650 mg  650 mg Oral Q4H PRN    HYDROmorphone (DILAUDID) injection 1 mg  1 mg IntraVENous Q4H PRN    ondansetron (ZOFRAN) injection 4 mg  4 mg IntraVENous Q4H PRN    enoxaparin (LOVENOX) injection 40 mg  40 mg SubCUTAneous Q24H    oxyCODONE IR (ROXICODONE) tablet 5 mg  5 mg Oral Q4H PRN    senna-docusate (PERICOLACE) 8.6-50 mg per tablet 1 Tab  1 Tab Oral DAILY    nicotine (NICODERM CQ) 14 mg/24 hr patch 1 Patch  1 Patch TransDERmal DAILY    aspirin delayed-release tablet 81 mg  81 mg Oral DAILY    atorvastatin (LIPITOR) tablet 10 mg  10 mg Oral DAILY    hydroCHLOROthiazide (HYDRODIURIL) tablet 12.5 mg  12.5 mg Oral DAILY    lisinopril (PRINIVIL, ZESTRIL) tablet 10 mg  10 mg Oral DAILY    0.9% sodium chloride infusion  100 mL/hr IntraVENous CONTINUOUS     ______________________________________________________________________  EXPECTED LENGTH OF STAY: - - -  ACTUAL LENGTH OF STAY: 1                 Clara Rock MD

## 2018-11-11 NOTE — PROGRESS NOTES
Patient seen and examined and chart reviewed. Full consult dictated. Large retroperitoneal mass invading into the left renal hilum with some moderate left hydronephrosis. Creatinine is normal.  No urologic surgical intervention needed at this time. Will be available if needed.

## 2018-11-11 NOTE — CONSULTS
[address]    Ana Rosa Goncalves  MR#: [id number]  : 1975  ACCOUNT #: [de-identified]   DATE OF SERVICE: 2018    REFERRING PHYSICIAN:  Balaji Couch MD.    REASON FOR EVALUATION:  1. Large retroperitoneal mass invading the left renal hilum. 2.  Moderate left hydronephrosis. HISTORY OF PRESENT ILLNESS:  The patient is a 79-year-old  male who presented to an outside ER for evaluation of low back pain lasting about 3 days. He says it was constant, it was radiating to the buttocks. It was moderate to severe. There were no exacerbating or alleviating factors. He said it was associated with increased urination. He had a CT scan which demonstrated a large retroperitoneal mass invading into the left renal hilum with moderate left hydronephrosis and urology was consulted. PAST MEDICAL HISTORY:  1. Hypertension. 2.  Hyperlipidemia. 3.  Anxiety. PAST SURGICAL HISTORY:  Noncontributory. MEDICATIONS:  At the time of admission include lorazepam 0.5 mg, Tylenol PM, lisinopril 10 mg daily, Lipitor 10 mg daily, hydrochlorothiazide 12.5 mg daily and 81 mg aspirin daily. ALLERGIES:  HE HAS NO KNOWN DRUG ALLERGIES. FAMILY HISTORY:  Significant for hypertension. SOCIAL HISTORY:  Significant for tobacco use. He smokes 2 packs per day and has done for about 20 years. REVIEW OF SYSTEMS:  As noted in the HPI. PHYSICAL EXAMINATION:  GENERAL:  Reveals a young  male who appears his stated age. NEUROLOGIC:  He is alert and oriented x3. VITAL SIGNS:  His temperature is 98.7, blood pressure is 154/89, pulse 71, respiratory rate 14. HEENT:  Normocephalic, atraumatic. Extraocular muscles are intact. NECK:  No jugular venous distention. Sclerae are anicteric. CARDIOVASCULAR:  Regular rate. PULMONARY:  Clear. ABDOMEN:  Soft, nontender, nondistended. There is normoactive bowel sounds. No organomegaly palpated.   SKIN:  Warm and dry.  EXTREMITIES:  Demonstrate no clubbing, cyanosis or edema. GENITOURINARY:  Reveals a circumcised phallus with an adequate meatus. Testes descended bilaterally symmetrical, nontender, no masses. There is a mobile large right inguinal lymph node. NEUROLOGIC:  Cranial nerves II-XII are intact. LABORATORY DATA:  Blood work shows white blood cell count of 7, H and H 12 and 37, platelets of 768. , potassium 4.0, chloride 110, bicarbonate 24, BUN 11, creatinine 0.9 and glucose of 97. CT scan was personally reviewed, demonstrating the aforementioned findings of the large retroperitoneal mass invading the left renal fossa with moderate left hydronephrosis. ASSESSMENT AND PLAN:    1. Large retroperitoneal mass. 2.  Moderate left hydronephrosis. Creatinine is normal at this time, so no urgent urologic surgical intervention is needed. We can see the results of biopsy and go from there. If stenting is needed at the left renal unit, we will be available.       MD KINA Willard /   D: 11/11/2018 13:05     T: 11/11/2018 14:13  JOB #: [id number]

## 2018-11-11 NOTE — PROGRESS NOTES
Bedside shift change report given to [address] (oncoming nurse) by Cathleen Jacinto 1998 (offgoing nurse). Report included the following information SBAR and Recent Results.

## 2018-11-11 NOTE — ROUTINE PROCESS
Bedside and Verbal shift change report given to Leann Jean (oncoming nurse) by Manuel Anthony (offgoing nurse). Report included the following information SBAR.

## 2018-11-12 PROBLEM — R59.1 LYMPHADENOPATHY: Status: ACTIVE | Noted: 2018-11-12

## 2018-11-12 LAB
ALBUMIN SERPL-MCNC: 2.9 G/DL (ref 3.5–5)
ALBUMIN/GLOB SERPL: 0.8 {RATIO} (ref 1.1–2.2)
ALP SERPL-CCNC: 77 U/L (ref 45–117)
ALT SERPL-CCNC: 12 U/L (ref 12–78)
ANION GAP SERPL CALC-SCNC: 8 MMOL/L (ref 5–15)
AST SERPL-CCNC: 7 U/L (ref 15–37)
BILIRUB SERPL-MCNC: 0.2 MG/DL (ref 0.2–1)
BUN SERPL-MCNC: 10 MG/DL (ref 6–20)
BUN/CREAT SERPL: 10 (ref 12–20)
CALCIUM SERPL-MCNC: 8.6 MG/DL (ref 8.5–10.1)
CHLORIDE SERPL-SCNC: 108 MMOL/L (ref 97–108)
CO2 SERPL-SCNC: 25 MMOL/L (ref 21–32)
CREAT SERPL-MCNC: 1.02 MG/DL (ref 0.7–1.3)
ERYTHROCYTE [DISTWIDTH] IN BLOOD BY AUTOMATED COUNT: 14.7 % (ref 11.5–14.5)
GLOBULIN SER CALC-MCNC: 3.5 G/DL (ref 2–4)
GLUCOSE SERPL-MCNC: 106 MG/DL (ref 65–100)
HCT VFR BLD AUTO: 36.3 % (ref 36.6–50.3)
HGB BLD-MCNC: 11.9 G/DL (ref 12.1–17)
MCH RBC QN AUTO: 30.5 PG (ref 26–34)
MCHC RBC AUTO-ENTMCNC: 32.8 G/DL (ref 30–36.5)
MCV RBC AUTO: 93.1 FL (ref 80–99)
NRBC # BLD: 0 K/UL (ref 0–0.01)
NRBC BLD-RTO: 0 PER 100 WBC
PLATELET # BLD AUTO: 234 K/UL (ref 150–400)
PMV BLD AUTO: 10.7 FL (ref 8.9–12.9)
POTASSIUM SERPL-SCNC: 3.6 MMOL/L (ref 3.5–5.1)
PROT SERPL-MCNC: 6.4 G/DL (ref 6.4–8.2)
RBC # BLD AUTO: 3.9 M/UL (ref 4.1–5.7)
SODIUM SERPL-SCNC: 141 MMOL/L (ref 136–145)
WBC # BLD AUTO: 7.2 K/UL (ref 4.1–11.1)

## 2018-11-12 PROCEDURE — 74011250637 HC RX REV CODE- 250/637: Performed by: INTERNAL MEDICINE

## 2018-11-12 PROCEDURE — 80053 COMPREHEN METABOLIC PANEL: CPT

## 2018-11-12 PROCEDURE — 74011250636 HC RX REV CODE- 250/636: Performed by: HOSPITALIST

## 2018-11-12 PROCEDURE — 36415 COLL VENOUS BLD VENIPUNCTURE: CPT

## 2018-11-12 PROCEDURE — 74011250637 HC RX REV CODE- 250/637: Performed by: HOSPITALIST

## 2018-11-12 PROCEDURE — 65270000029 HC RM PRIVATE

## 2018-11-12 PROCEDURE — 85027 COMPLETE CBC AUTOMATED: CPT

## 2018-11-12 RX ORDER — LISINOPRIL 10 MG/1
10 TABLET ORAL ONCE
Status: COMPLETED | OUTPATIENT
Start: 2018-11-12 | End: 2018-11-12

## 2018-11-12 RX ADMIN — OXYCODONE HYDROCHLORIDE 5 MG: 5 TABLET ORAL at 10:06

## 2018-11-12 RX ADMIN — LISINOPRIL 10 MG: 10 TABLET ORAL at 09:54

## 2018-11-12 RX ADMIN — SODIUM CHLORIDE 75 ML/HR: 900 INJECTION, SOLUTION INTRAVENOUS at 07:16

## 2018-11-12 RX ADMIN — HYDROCHLOROTHIAZIDE 12.5 MG: 25 TABLET ORAL at 09:55

## 2018-11-12 RX ADMIN — LISINOPRIL 10 MG: 10 TABLET ORAL at 22:29

## 2018-11-12 RX ADMIN — Medication 10 ML: at 21:16

## 2018-11-12 RX ADMIN — Medication 10 ML: at 07:16

## 2018-11-12 RX ADMIN — ATORVASTATIN CALCIUM 10 MG: 10 TABLET, FILM COATED ORAL at 09:55

## 2018-11-12 RX ADMIN — SODIUM CHLORIDE 75 ML/HR: 900 INJECTION, SOLUTION INTRAVENOUS at 20:17

## 2018-11-12 RX ADMIN — OXYCODONE HYDROCHLORIDE 5 MG: 5 TABLET ORAL at 16:48

## 2018-11-12 RX ADMIN — OXYCODONE HYDROCHLORIDE 5 MG: 5 TABLET ORAL at 21:16

## 2018-11-12 RX ADMIN — SENNOSIDES AND DOCUSATE SODIUM 1 TABLET: 8.6; 5 TABLET ORAL at 09:54

## 2018-11-12 NOTE — PROGRESS NOTES
-Hematology / Oncology ([facility]) -  -Primary Oncologist-   -CC-    -S-  No events. afebrile    -O-      Patient Vitals for the past 24 hrs:   Temp Pulse Resp BP SpO2   11/12/18 0804 98.3 °F (36.8 °C) 64 17 154/87 96 %   11/12/18 0219 98.7 °F (37.1 °C) 60 16 154/86 97 %   11/11/18 2016 98.6 °F (37 °C) 70 16 (!) 149/93 97 %   11/11/18 1431 98.1 °F (36.7 °C) 70 16 147/88 97 %     No intake/output data recorded. Gen: nad  Chest: bilateral breath sounds present  Cardiac: rrr  Heme/lymph:  Large inguinal node  Abd: s/nt    -Labs-    Recent Labs     11/12/18  0041 11/11/18  0150 11/09/18  2149   WBC 7.2 6.9 10.6   HGB 11.9* 12.2 13.5    226 260   ANEU  --   --  7.9   INR  --   --  1.0    143 138   K 3.6 4.0 3.6   * 97 98   BUN 10 11 8   CREA 1.02 0.91 1.17   ALT 12 12 14   SGOT 7* 7* 10*   TBILI 0.2 0.2 0.2   AP 77 81 98   CA 8.6 8.5 8.9       -Imaging-     CT Abd  1. Interval development of a large retroperitoneal mass encircling the aorta  with invasion of the left renal hilum and left adrenal gland. Several adjacent  lymph nodes are seen extending into the peritoneum and underneath the  diaphragmatic natalie. This most likely represents lymphoma. 2. Several new bilateral enlarged inguinal lymph nodes also likely representing  lymphoma. 3. Moderate left hydronephrosis with a delayed renal nephrogram related to  decreased renal function. This is related to the invasion of the renal hilum.      -Assessment + Plan-     1. Retroperitoneal mass/lymphadenopathy.         -- consult surgery to excise the inguinal node    2.  following

## 2018-11-12 NOTE — CONSULTS
Patient seen at request of Dr. Carey Perry. Elson Cowden is an 41 y.o. male who was recently admitted for further work up of a retroperitoneal mass. Mr. Mckinley Nunez tells me that he began experiencing back pain approximately one week ago. The pain became progressively worse. No nausea or vomitting. He denies fevers, chills or night sweats. No recent weight loss. Appetite has been good. Denies dysuria or hematuria. He has otherwise been in his usual state of health. CT Scan abdomen/pelvis with IV contrast - 11/9/2018 - Interval development of a large retroperitoneal mass encircling the aorta with invasion of the left renal hilum and left adrenal gland. Several adjacent lymph nodes are seen extending into the peritoneum and underneath the diaphragmatic natalie. This most likely represents lymphoma. Several new bilateral enlarged inguinal lymph nodes also likely representing  Lymphoma. Moderate left hydronephrosis with a delayed renal nephrogram related to decreased renal function. This is related to the invasion of the renal hilum. CT scan chest with IV contrast - 11/11/2018 - Trace left pleural effusion. Bilateral lower lobe atelectasis. Large retroperitoneal mass lesion again demonstrated. Allergies - Patient has no known allergies. Meds - Reviewed. PMH -   Past Medical History:   Diagnosis Date    Anxiety     Hyperlipidemia     Hypertension     Lymphadenopathy 11/12/2018     PSH - History reviewed. No pertinent surgical history.     Fam Hx -   Family History   Problem Relation Age of Onset    Hypertension Father      Soc Hx -   Social History     Tobacco Use    Smoking status: Current Every Day Smoker     Packs/day: 2.00     Years: 20.00     Pack years: 40.00    Smokeless tobacco: Never Used   Substance Use Topics    Alcohol use: No     Frequency: Never     Patient is a well developed, well nourished man in NAD  Visit Vitals  BP (!) 152/92 (BP 1 Location: Right arm, BP Patient Position: At rest) Pulse 69   Temp 98.1 °F (36.7 °C)   Resp 18   SpO2 98%     HEENT: Anicteric. Neck: Supple without palpable lymphadenopathy. Cor: RRR. Lungs: Clear to auscultation bilaterally. Abd: Soft. Slightly distended. Non tender. No guarding or rebound. Right Groin: Palpable inguinal lymph node. Ext: No edema. Neuro: Grossly Non focal.     Labs -   Recent Results (from the past 24 hour(s))   CBC W/O DIFF    Collection Time: 11/12/18 12:41 AM   Result Value Ref Range    WBC 7.2 4.1 - 11.1 K/uL    RBC 3.90 (L) 4.10 - 5.70 M/uL    HGB 11.9 (L) 12.1 - 17.0 g/dL    HCT 36.3 (L) 36.6 - 50.3 %    MCV 93.1 80.0 - 99.0 FL    MCH 30.5 26.0 - 34.0 PG    MCHC 32.8 30.0 - 36.5 g/dL    RDW 14.7 (H) 11.5 - 14.5 %    PLATELET 898 267 - 787 K/uL    MPV 10.7 8.9 - 12.9 FL    NRBC 0.0 0  WBC    ABSOLUTE NRBC 0.00 0.00 - 4.09 K/uL   METABOLIC PANEL, COMPREHENSIVE    Collection Time: 11/12/18 12:41 AM   Result Value Ref Range    Sodium 141 136 - 145 mmol/L    Potassium 3.6 3.5 - 5.1 mmol/L    Chloride 108 97 - 108 mmol/L    CO2 25 21 - 32 mmol/L    Anion gap 8 5 - 15 mmol/L    Glucose 106 (H) 65 - 100 mg/dL    BUN 10 6 - 20 MG/DL    Creatinine 1.02 0.70 - 1.30 MG/DL    BUN/Creatinine ratio 10 (L) 12 - 20      GFR est AA >60 >60 ml/min/1.73m2    GFR est non-AA >60 >60 ml/min/1.73m2    Calcium 8.6 8.5 - 10.1 MG/DL    Bilirubin, total 0.2 0.2 - 1.0 MG/DL    ALT (SGPT) 12 12 - 78 U/L    AST (SGOT) 7 (L) 15 - 37 U/L    Alk. phosphatase 77 45 - 117 U/L    Protein, total 6.4 6.4 - 8.2 g/dL    Albumin 2.9 (L) 3.5 - 5.0 g/dL    Globulin 3.5 2.0 - 4.0 g/dL    A-G Ratio 0.8 (L) 1.1 - 2.2       Imaging Studies - Reviewed. Imp: Pt. Is a 41 y.o. male with a retroperitoneal mass and enlarged right inguinal lymph node. Plan: 1. In view of the findings on H and P, Mr. Yun Johnson should benefit from excisional biopsy of the enlarged right inguinal lymph node. Discussed procedure with him including risks of bleeding, infection.  He understands and wishes to proceed. 2. Needs consent. 3. Diet as tolerated for now. 4. NPO after midnight. 5. Hematology/Oncology following. 6. Plans per Dr. Pedraza Heads. Following.

## 2018-11-12 NOTE — CONSULTS
Health system Surgery at [address] [facility] Consultation    Admit Date: 11/10/2018  Reason for Consultation: excisional biopsy of inguinal lymph node    HPI:  Ghislaine Strong is a 41 y.o. male whom we are asked to see in consultation for excisional biopsy of inguinal lymph node. He was admitted on 11/10/18 with 3 day history of low back pain. CT imaging revealed a large retroperitoneal mass encircling the aorta with invasion of the left renal hilum and left adrenal gland. Several adjacent lymph nodes are seen extending into the peritoneum and underneath the diaphragmatic natalie most likely represents lymphoma. Additional findings: Several new bilateral enlarged inguinal lymph nodes also likely representing lymphoma; Moderate left hydronephrosis with a delayed renal nephrogram related to  decreased renal function. This is related to the invasion of the renal hilum. At present the pain continues primarily in his left lower back. He also notes lack of appetite. He states that prior to this he was in his usual state of health. His last PO intake was at 1:30 pm today. Patient Active Problem List    Diagnosis Date Noted    Retroperitoneal mass 11/10/2018    Hyperlipidemia     HTN (hypertension) 09/27/2016    Tobacco abuse 09/27/2016    Left sided numbness 09/27/2016     Past Medical History:   Diagnosis Date    Anxiety     Hyperlipidemia     Hypertension       History reviewed. No pertinent surgical history. Social History     Tobacco Use    Smoking status: Current Every Day Smoker     Packs/day: 2.00     Years: 20.00     Pack years: 40.00    Smokeless tobacco: Never Used   Substance Use Topics    Alcohol use: No     Frequency: Never      Family History   Problem Relation Age of Onset    Hypertension Father       Prior to Admission medications    Medication Sig Start Date End Date Taking? Authorizing Provider   LORazepam (ATIVAN) 0.5 mg tablet Take 0.5 mg by mouth.    Yes Other, MD Diya   acetaminophen/diphenhydramine (TYLENOL PM PO) Take  by mouth. Yes Other, MD Diya   lisinopril (PRINIVIL, ZESTRIL) 10 mg tablet Take 1 Tab by mouth daily. 18  Yes Rosemary Harrington MD   atorvastatin (LIPITOR) 10 mg tablet Take 1 Tab by mouth daily. 18  Yes Rosemary Harrington MD   hydroCHLOROthiazide (HYDRODIURIL) 12.5 mg tablet Take 1 Tab by mouth daily. 18  Yes Rosemary Harrington MD   aspirin delayed-release (ASPIR-81) 81 mg tablet Take 1 Tab by mouth daily. 18  Yes Magnant, Nancie Flight, MD     Current Facility-Administered Medications   Medication Dose Route Frequency    sodium chloride (NS) flush 5-10 mL  5-10 mL IntraVENous Q8H    sodium chloride (NS) flush 5-10 mL  5-10 mL IntraVENous PRN    acetaminophen (TYLENOL) tablet 650 mg  650 mg Oral Q4H PRN    HYDROmorphone (DILAUDID) injection 1 mg  1 mg IntraVENous Q4H PRN    ondansetron (ZOFRAN) injection 4 mg  4 mg IntraVENous Q4H PRN    enoxaparin (LOVENOX) injection 40 mg  40 mg SubCUTAneous Q24H    oxyCODONE IR (ROXICODONE) tablet 5 mg  5 mg Oral Q4H PRN    senna-docusate (PERICOLACE) 8.6-50 mg per tablet 1 Tab  1 Tab Oral DAILY    nicotine (NICODERM CQ) 14 mg/24 hr patch 1 Patch  1 Patch TransDERmal DAILY    atorvastatin (LIPITOR) tablet 10 mg  10 mg Oral DAILY    hydroCHLOROthiazide (HYDRODIURIL) tablet 12.5 mg  12.5 mg Oral DAILY    lisinopril (PRINIVIL, ZESTRIL) tablet 10 mg  10 mg Oral DAILY    0.9% sodium chloride infusion  75 mL/hr IntraVENous CONTINUOUS     No Known Allergies       Subjective:     Review of Systems:    A comprehensive review of systems was negative except for that written in the History of Present Illness. Objective:     Blood pressure (!) 152/92, pulse 69, temperature 98.1 °F (36.7 °C), resp. rate 18, SpO2 98 %.   Temp (24hrs), Av.4 °F (36.9 °C), Min:98.1 °F (36.7 °C), Max:98.7 °F (37.1 °C)      Recent Labs     18  0041 18  0150 18  2149   WBC 7.2 6.9 10.6   HGB 11.9* 12.2 13.5   HCT 36.3* 37.1 39.8    226 260     Recent Labs     11/12/18  0041 11/11/18  0150 11/09/18  2149    143 138   K 3.6 4.0 3.6    110* 101   CO2 25 24 25   * 97 98   BUN 10 11 8   CREA 1.02 0.91 1.17   CA 8.6 8.5 8.9   ALB 2.9* 2.7* 3.3*   TBILI 0.2 0.2 0.2   SGOT 7* 7* 10*   ALT 12 12 14   INR  --   --  1.0     Recent Labs     11/09/18  2149   LPSE 198         Intake/Output Summary (Last 24 hours) at 11/12/2018 1530  Last data filed at 11/11/2018 2332  Gross per 24 hour   Intake 1382 ml   Output --   Net 1382 ml        _____________________  Physical Exam:     General:  Alert, cooperative, no distress, appears stated age. Eyes:   PERRL, EOMs intact. Sclera clear. Throat: Lips, mucosa, and tongue normal.   Neck: Supple, symmetrical, trachea midline. Lungs:   Clear to auscultation bilaterally. Heart:  Regular rate and rhythm. Abdomen:   Normal BS, rounded, Soft, non-tender. Groin:  Right groin with large, superficial, palpable node. Skin: Skin color, texture, turgor normal. No rashes or lesions. Assessment:   Principal Problem:    Retroperitoneal mass (11/10/2018)            Plan:     Surgical plans per Dr. Felicita Bran     Thank you for allowing us to participate in the care of this patient. Total time spent with patient: 12 minutes.     Signed By: Cheyenne Tracey NP     November 12, 2018

## 2018-11-12 NOTE — PROGRESS NOTES
Bedside and Verbal shift change report given to Leann Jean (oncoming nurse) by Jesse Malcolm (offgoing nurse). Report included the following information SBAR.

## 2018-11-12 NOTE — PROGRESS NOTES
Hospitalist Progress Note  Itzel Sandoval MD  Answering service: [phone] from in house phone  Cell: [phone]      Date of Service:  2018  NAME:  Victorino Hickman  :  1975  MRN:  [id number]      Admission Summary:   A 41 y.o man with HTN who presented to an outside ER with low back pain x 3 days. The pain is constant, radiates to the buttocks, without exacerbating or alleviating factors, associated with increased urination, no nausea, vomiting or diarrhea. No dysuria or fever. He's unsure about weight loss. Work-up there revealed a retroperitoneal mass and he was transferred here for further work-up. Interval history / Subjective:   He said he feels better, no chest pain or shortness of breath     Assessment & Plan:     Retroperitoneal mass possible lymphoma  -CT abdomen interval development of a large retroperitoneal mass encircling the aorta  with invasion of the left renal hilum and left adrenal gland. Several adjacent lymph nodes are seen extending into the peritoneum and underneath the diaphragmatic natalie. This most likely represents lymphoma, several new bilateral enlarged inguinal lymph nodes   -patient need bx  -Hem/Onc on board    Left hydronephrosis   -creatinine improved, cut back normal saline at 75 ml/hr  -CT showed moderate left hydronephrosis with a delayed renal nephrogram related to  decreased renal function.  This is related to the invasion of the renal hilum  -monitor renal function  -seen by Urologist    HTN  -BP normal, continue lisinopril, hydrochlorothiazide and monitor BP    Tobacco abuse  -on nicotine  -advised to stop smoking    Code status: Full Code  DVT prophylaxis: Lovenox    Care Plan discussed with: Patient/Family and Nurse  Disposition: TBD     Hospital Problems  Date Reviewed: 2016          Codes Class Noted POA    * (Principal) Retroperitoneal mass ICD-10-CM: R19.00  ICD-9-CM: 789.30  11/10/2018 Unknown              Vital Signs:    Last 24hrs VS reviewed since prior progress note. Most recent are:  Visit Vitals  /87 (BP 1 Location: Right arm, BP Patient Position: At rest)   Pulse 64   Temp 98.3 °F (36.8 °C)   Resp 17   SpO2 96%         Intake/Output Summary (Last 24 hours) at 11/12/2018 1139  Last data filed at 11/11/2018 2332  Gross per 24 hour   Intake 1382 ml   Output --   Net 1382 ml        Physical Examination:             Constitutional:  No acute distress, cooperative, pleasant    ENT:  Oral mucous moist, oropharynx benign. Neck supple,    Resp:  CTA bilaterally. No wheezing/rhonchi/rales. No accessory muscle use   CV:  Regular rhythm, normal rate, no murmurs, gallops, rubs    GI:  Soft, non distended, non tender. normoactive bowel sounds, no hepatosplenomegaly     Musculoskeletal:  No edema    Neurologic:  Moves all extremities. AAOx3, CN II-XII reviewed     Skin:  Good turgor, no rashes or ulcers       Data Review:    Review and/or order of clinical lab test      Labs:     Recent Labs     11/12/18 0041 11/11/18 0150   WBC 7.2 6.9   HGB 11.9* 12.2   HCT 36.3* 37.1    226     Recent Labs     11/12/18 0041 11/11/18 0150 11/09/18  2149    143 138   K 3.6 4.0 3.6    110* 101   CO2 25 24 25   BUN 10 11 8   CREA 1.02 0.91 1.17   * 97 98   CA 8.6 8.5 8.9     Recent Labs     11/12/18 0041 11/11/18 0150 11/09/18  2149   SGOT 7* 7* 10*   ALT 12 12 14   AP 77 81 98   TBILI 0.2 0.2 0.2   TP 6.4 6.1* 7.1   ALB 2.9* 2.7* 3.3*   GLOB 3.5 3.4 3.8   LPSE  --   --  198     Recent Labs     11/09/18  2149   INR 1.0   PTP 9.5      No results for input(s): FE, TIBC, PSAT, FERR in the last 72 hours. No results found for: FOL, RBCF   No results for input(s): PH, PCO2, PO2 in the last 72 hours.   Recent Labs     11/09/18 2149   TROIQ <0.05     Lab Results   Component Value Date/Time    Cholesterol, total 170 09/28/2016 04:40 AM    HDL Cholesterol 23 09/28/2016 04:40 AM    LDL, calculated 96.4 09/28/2016 04:40 AM    Triglyceride 253 (H) 09/28/2016 04:40 AM    CHOL/HDL Ratio 7.4 (H) 09/28/2016 04:40 AM     Lab Results   Component Value Date/Time    Glucose (POC) 98 06/05/2009 02:13 PM     Lab Results   Component Value Date/Time    Color STRAW 11/09/2018 09:49 PM    Appearance CLEAR 11/09/2018 09:49 PM    Specific gravity 1.016 11/09/2018 09:49 PM    Specific gravity 1.010 01/22/2017 11:20 AM    pH (UA) 6.5 11/09/2018 09:49 PM    Protein NEGATIVE  11/09/2018 09:49 PM    Glucose NEGATIVE  11/09/2018 09:49 PM    Ketone NEGATIVE  11/09/2018 09:49 PM    Bilirubin NEGATIVE  11/09/2018 09:49 PM    Urobilinogen 0.2 11/09/2018 09:49 PM    Nitrites NEGATIVE  11/09/2018 09:49 PM    Leukocyte Esterase NEGATIVE  11/09/2018 09:49 PM    Epithelial cells FEW 11/09/2018 09:49 PM    Bacteria NEGATIVE  11/09/2018 09:49 PM    WBC 0-4 11/09/2018 09:49 PM    RBC 0-5 11/09/2018 09:49 PM         Medications Reviewed:     Current Facility-Administered Medications   Medication Dose Route Frequency    sodium chloride (NS) flush 5-10 mL  5-10 mL IntraVENous Q8H    sodium chloride (NS) flush 5-10 mL  5-10 mL IntraVENous PRN    acetaminophen (TYLENOL) tablet 650 mg  650 mg Oral Q4H PRN    HYDROmorphone (DILAUDID) injection 1 mg  1 mg IntraVENous Q4H PRN    ondansetron (ZOFRAN) injection 4 mg  4 mg IntraVENous Q4H PRN    enoxaparin (LOVENOX) injection 40 mg  40 mg SubCUTAneous Q24H    oxyCODONE IR (ROXICODONE) tablet 5 mg  5 mg Oral Q4H PRN    senna-docusate (PERICOLACE) 8.6-50 mg per tablet 1 Tab  1 Tab Oral DAILY    nicotine (NICODERM CQ) 14 mg/24 hr patch 1 Patch  1 Patch TransDERmal DAILY    atorvastatin (LIPITOR) tablet 10 mg  10 mg Oral DAILY    hydroCHLOROthiazide (HYDRODIURIL) tablet 12.5 mg  12.5 mg Oral DAILY    lisinopril (PRINIVIL, ZESTRIL) tablet 10 mg  10 mg Oral DAILY    0.9% sodium chloride infusion  75 mL/hr IntraVENous CONTINUOUS ______________________________________________________________________  EXPECTED LENGTH OF STAY: 2d 14h  ACTUAL LENGTH OF STAY:          2                 Asad Dee MD

## 2018-11-13 ENCOUNTER — ANESTHESIA EVENT (OUTPATIENT)
Dept: SURGERY | Age: 43
DRG: 824 | End: 2018-11-13
Payer: SUBSIDIZED

## 2018-11-13 ENCOUNTER — ANESTHESIA (OUTPATIENT)
Dept: SURGERY | Age: 43
DRG: 824 | End: 2018-11-13
Payer: SUBSIDIZED

## 2018-11-13 PROCEDURE — 74011000250 HC RX REV CODE- 250

## 2018-11-13 PROCEDURE — 74011250636 HC RX REV CODE- 250/636

## 2018-11-13 PROCEDURE — 77030010938 HC CLP LIG TELE -A: Performed by: SURGERY

## 2018-11-13 PROCEDURE — 65270000029 HC RM PRIVATE

## 2018-11-13 PROCEDURE — 77030008684 HC TU ET CUF COVD -B: Performed by: ANESTHESIOLOGY

## 2018-11-13 PROCEDURE — 77030026438 HC STYL ET INTUB CARD -A: Performed by: ANESTHESIOLOGY

## 2018-11-13 PROCEDURE — 77030011640 HC PAD GRND REM COVD -A: Performed by: SURGERY

## 2018-11-13 PROCEDURE — 74011250637 HC RX REV CODE- 250/637: Performed by: HOSPITALIST

## 2018-11-13 PROCEDURE — 88307 TISSUE EXAM BY PATHOLOGIST: CPT

## 2018-11-13 PROCEDURE — 76010000149 HC OR TIME 1 TO 1.5 HR: Performed by: SURGERY

## 2018-11-13 PROCEDURE — 88341 IMHCHEM/IMCYTCHM EA ADD ANTB: CPT

## 2018-11-13 PROCEDURE — 88360 TUMOR IMMUNOHISTOCHEM/MANUAL: CPT

## 2018-11-13 PROCEDURE — 74011250636 HC RX REV CODE- 250/636: Performed by: HOSPITALIST

## 2018-11-13 PROCEDURE — 76210000006 HC OR PH I REC 0.5 TO 1 HR: Performed by: SURGERY

## 2018-11-13 PROCEDURE — 74011000250 HC RX REV CODE- 250: Performed by: SURGERY

## 2018-11-13 PROCEDURE — 88305 TISSUE EXAM BY PATHOLOGIST: CPT

## 2018-11-13 PROCEDURE — 88342 IMHCHEM/IMCYTCHM 1ST ANTB: CPT

## 2018-11-13 PROCEDURE — 76060000033 HC ANESTHESIA 1 TO 1.5 HR: Performed by: SURGERY

## 2018-11-13 PROCEDURE — 77030008771 HC TU NG SALEM SUMP -A: Performed by: ANESTHESIOLOGY

## 2018-11-13 PROCEDURE — 77030018836 HC SOL IRR NACL ICUM -A: Performed by: SURGERY

## 2018-11-13 PROCEDURE — 77030020782 HC GWN BAIR PAWS FLX 3M -B

## 2018-11-13 PROCEDURE — 07BH0ZX EXCISION OF RIGHT INGUINAL LYMPHATIC, OPEN APPROACH, DIAGNOSTIC: ICD-10-PCS | Performed by: SURGERY

## 2018-11-13 PROCEDURE — 77030031139 HC SUT VCRL2 J&J -A: Performed by: SURGERY

## 2018-11-13 RX ORDER — BUPIVACAINE HYDROCHLORIDE 2.5 MG/ML
INJECTION, SOLUTION EPIDURAL; INFILTRATION; INTRACAUDAL AS NEEDED
Status: DISCONTINUED | OUTPATIENT
Start: 2018-11-13 | End: 2018-11-13 | Stop reason: HOSPADM

## 2018-11-13 RX ORDER — SUCCINYLCHOLINE CHLORIDE 20 MG/ML
INJECTION INTRAMUSCULAR; INTRAVENOUS AS NEEDED
Status: DISCONTINUED | OUTPATIENT
Start: 2018-11-13 | End: 2018-11-13 | Stop reason: HOSPADM

## 2018-11-13 RX ORDER — DEXAMETHASONE SODIUM PHOSPHATE 4 MG/ML
INJECTION, SOLUTION INTRA-ARTICULAR; INTRALESIONAL; INTRAMUSCULAR; INTRAVENOUS; SOFT TISSUE AS NEEDED
Status: DISCONTINUED | OUTPATIENT
Start: 2018-11-13 | End: 2018-11-13 | Stop reason: HOSPADM

## 2018-11-13 RX ORDER — ACETAMINOPHEN 325 MG/1
650 TABLET ORAL
Status: DISCONTINUED | OUTPATIENT
Start: 2018-11-13 | End: 2018-11-16 | Stop reason: HOSPADM

## 2018-11-13 RX ORDER — GLYCOPYRROLATE 0.2 MG/ML
INJECTION INTRAMUSCULAR; INTRAVENOUS AS NEEDED
Status: DISCONTINUED | OUTPATIENT
Start: 2018-11-13 | End: 2018-11-13 | Stop reason: HOSPADM

## 2018-11-13 RX ORDER — LIDOCAINE HYDROCHLORIDE 20 MG/ML
INJECTION, SOLUTION EPIDURAL; INFILTRATION; INTRACAUDAL; PERINEURAL AS NEEDED
Status: DISCONTINUED | OUTPATIENT
Start: 2018-11-13 | End: 2018-11-13 | Stop reason: HOSPADM

## 2018-11-13 RX ORDER — ONDANSETRON 2 MG/ML
INJECTION INTRAMUSCULAR; INTRAVENOUS AS NEEDED
Status: DISCONTINUED | OUTPATIENT
Start: 2018-11-13 | End: 2018-11-13 | Stop reason: HOSPADM

## 2018-11-13 RX ORDER — FENTANYL CITRATE 50 UG/ML
INJECTION, SOLUTION INTRAMUSCULAR; INTRAVENOUS AS NEEDED
Status: DISCONTINUED | OUTPATIENT
Start: 2018-11-13 | End: 2018-11-13 | Stop reason: HOSPADM

## 2018-11-13 RX ORDER — PROPOFOL 10 MG/ML
INJECTION, EMULSION INTRAVENOUS AS NEEDED
Status: DISCONTINUED | OUTPATIENT
Start: 2018-11-13 | End: 2018-11-13 | Stop reason: HOSPADM

## 2018-11-13 RX ORDER — CEFAZOLIN SODIUM 1 G/3ML
INJECTION, POWDER, FOR SOLUTION INTRAMUSCULAR; INTRAVENOUS AS NEEDED
Status: DISCONTINUED | OUTPATIENT
Start: 2018-11-13 | End: 2018-11-13 | Stop reason: HOSPADM

## 2018-11-13 RX ORDER — MIDAZOLAM HYDROCHLORIDE 1 MG/ML
INJECTION, SOLUTION INTRAMUSCULAR; INTRAVENOUS AS NEEDED
Status: DISCONTINUED | OUTPATIENT
Start: 2018-11-13 | End: 2018-11-13 | Stop reason: HOSPADM

## 2018-11-13 RX ORDER — SODIUM CHLORIDE, SODIUM LACTATE, POTASSIUM CHLORIDE, CALCIUM CHLORIDE 600; 310; 30; 20 MG/100ML; MG/100ML; MG/100ML; MG/100ML
INJECTION, SOLUTION INTRAVENOUS
Status: DISCONTINUED | OUTPATIENT
Start: 2018-11-13 | End: 2018-11-13 | Stop reason: HOSPADM

## 2018-11-13 RX ADMIN — SODIUM CHLORIDE, SODIUM LACTATE, POTASSIUM CHLORIDE, CALCIUM CHLORIDE: 600; 310; 30; 20 INJECTION, SOLUTION INTRAVENOUS at 12:44

## 2018-11-13 RX ADMIN — LIDOCAINE HYDROCHLORIDE 60 MG: 20 INJECTION, SOLUTION EPIDURAL; INFILTRATION; INTRACAUDAL; PERINEURAL at 12:57

## 2018-11-13 RX ADMIN — ENOXAPARIN SODIUM 40 MG: 40 INJECTION SUBCUTANEOUS at 09:17

## 2018-11-13 RX ADMIN — DEXAMETHASONE SODIUM PHOSPHATE 4 MG: 4 INJECTION, SOLUTION INTRA-ARTICULAR; INTRALESIONAL; INTRAMUSCULAR; INTRAVENOUS; SOFT TISSUE at 13:15

## 2018-11-13 RX ADMIN — Medication 10 ML: at 21:06

## 2018-11-13 RX ADMIN — SUCCINYLCHOLINE CHLORIDE 160 MG: 20 INJECTION INTRAMUSCULAR; INTRAVENOUS at 12:52

## 2018-11-13 RX ADMIN — Medication 10 ML: at 07:41

## 2018-11-13 RX ADMIN — MIDAZOLAM HYDROCHLORIDE 2 MG: 1 INJECTION, SOLUTION INTRAMUSCULAR; INTRAVENOUS at 12:44

## 2018-11-13 RX ADMIN — Medication 10 ML: at 15:17

## 2018-11-13 RX ADMIN — GLYCOPYRROLATE 0.2 MG: 0.2 INJECTION INTRAMUSCULAR; INTRAVENOUS at 13:22

## 2018-11-13 RX ADMIN — OXYCODONE HYDROCHLORIDE 5 MG: 5 TABLET ORAL at 15:17

## 2018-11-13 RX ADMIN — ONDANSETRON 4 MG: 2 INJECTION INTRAMUSCULAR; INTRAVENOUS at 13:34

## 2018-11-13 RX ADMIN — SODIUM CHLORIDE 75 ML/HR: 900 INJECTION, SOLUTION INTRAVENOUS at 07:41

## 2018-11-13 RX ADMIN — FENTANYL CITRATE 50 MCG: 50 INJECTION, SOLUTION INTRAMUSCULAR; INTRAVENOUS at 12:50

## 2018-11-13 RX ADMIN — SODIUM CHLORIDE 75 ML/HR: 900 INJECTION, SOLUTION INTRAVENOUS at 14:43

## 2018-11-13 RX ADMIN — DEXAMETHASONE SODIUM PHOSPHATE 4 MG: 4 INJECTION, SOLUTION INTRA-ARTICULAR; INTRALESIONAL; INTRAMUSCULAR; INTRAVENOUS; SOFT TISSUE at 13:48

## 2018-11-13 RX ADMIN — LISINOPRIL 10 MG: 10 TABLET ORAL at 09:17

## 2018-11-13 RX ADMIN — HYDROMORPHONE HYDROCHLORIDE 1 MG: 2 INJECTION INTRAMUSCULAR; INTRAVENOUS; SUBCUTANEOUS at 09:30

## 2018-11-13 RX ADMIN — ONDANSETRON 4 MG: 2 INJECTION INTRAMUSCULAR; INTRAVENOUS at 13:48

## 2018-11-13 RX ADMIN — SENNOSIDES AND DOCUSATE SODIUM 1 TABLET: 8.6; 5 TABLET ORAL at 09:17

## 2018-11-13 RX ADMIN — CEFAZOLIN SODIUM 2 G: 1 INJECTION, POWDER, FOR SOLUTION INTRAMUSCULAR; INTRAVENOUS at 13:08

## 2018-11-13 RX ADMIN — ATORVASTATIN CALCIUM 10 MG: 10 TABLET, FILM COATED ORAL at 09:17

## 2018-11-13 RX ADMIN — PROPOFOL 200 MG: 10 INJECTION, EMULSION INTRAVENOUS at 12:52

## 2018-11-13 RX ADMIN — FENTANYL CITRATE 50 MCG: 50 INJECTION, SOLUTION INTRAMUSCULAR; INTRAVENOUS at 12:44

## 2018-11-13 RX ADMIN — OXYCODONE HYDROCHLORIDE 5 MG: 5 TABLET ORAL at 09:17

## 2018-11-13 RX ADMIN — HYDROCHLOROTHIAZIDE 12.5 MG: 25 TABLET ORAL at 09:17

## 2018-11-13 NOTE — PERIOP NOTES
TRANSFER - OUT REPORT:    Verbal report given to Dorene on Debbie Stack  being transferred to  for routine post - op       Report consisted of patients Situation, Background, Assessment and   Recommendations(SBAR). Time Pre op antibiotic given:1308  Anesthesia Stop time: 3858  Cooley Present on Transfer to floor:no  Order for Cooley on Chart:no  Discharge Prescriptions with Chart:no    Information from the following report(s) SBAR, OR Summary, Intake/Output and MAR was reviewed with the receiving nurse. Opportunity for questions and clarification was provided. Is the patient on 02? NO       L/Min        Other       Is the patient on a monitor? NO    Is the nurse transporting with the patient? NO    Surgical Waiting Area notified of patient's transfer from PACU? YES      The following personal items collected during your admission accompanied patient upon transfer:   Dental Appliance: Dental Appliances: None  Vision: Visual Aid: None  Hearing Aid:    Jewelry: Jewelry: None  Clothing: Clothing: (P) (underwear given to nurse from 6th floor)  Other Valuables:  Other Valuables: None  Valuables sent to safe:

## 2018-11-13 NOTE — PROGRESS NOTES
TRANSFER - IN REPORT:    Verbal report received from BARBARARN(name) on Shady Haynes  being received from 6E(unit) for ordered procedure      Report consisted of patients Situation, Background, Assessment and   Recommendations(SBAR). Information from the following report(s) SBAR, Kardex, Intake/Output, MAR, Recent Results and Pre Procedure Checklist was reviewed with the receiving nurse. Opportunity for questions and clarification was provided. Assessment completed upon patients arrival to unit and care assumed.

## 2018-11-13 NOTE — PROGRESS NOTES
Bedside and Verbal shift change report given to Juan M Prince RN (oncoming nurse) by Chaya Guadarrama RN (offgoing nurse). Report included the following information SBAR, Kardex and MAR.

## 2018-11-13 NOTE — PROGRESS NOTES
Hospitalist Progress Note  Candy Calvin MD  Answering service: [phone] OR [phone] from in house phone  Cell: [phone]      Date of Service:  2018  NAME:  Joel Ibarra  :  1975  MRN:  [id number]      Admission Summary:   A 41 y.o man with HTN who presented to an outside ER with low back pain x 3 days. The pain is constant, radiates to the buttocks, without exacerbating or alleviating factors, associated with increased urination, no nausea, vomiting or diarrhea. No dysuria or fever. He's unsure about weight loss. Work-up there revealed a retroperitoneal mass and he was transferred here for further work-up. Interval history / Subjective:   Pt seen for FU of retroperitoneal mass  First encounter  Comfortable  Pain is tolerable with IV dilaudid  Afebrile  Ready for surgical removal of inguinal node  no chest pain or shortness of breath  Dw nursing, orders reviewed     Assessment & Plan:     Retroperitoneal mass possible lymphoma  -CT abdomen interval development of a large retroperitoneal mass encircling the aorta  with invasion of the left renal hilum and left adrenal gland. Several adjacent lymph nodes are seen extending into the peritoneum and underneath the diaphragmatic natalie. This most likely represents lymphoma, several new bilateral enlarged inguinal lymph nodes   -Gen surg on board, inguinal LN biopsy   -Hem/Onc on board  - requiring IV dilaudid for pain control, will need to deescalate soon    Left hydronephrosis   -creatinine improved, cut back normal saline at 75 ml/hr  -CT showed moderate left hydronephrosis with a delayed renal nephrogram related to  decreased renal function.  This is related to the invasion of the renal hilum  -monitor renal function  -seen by Urologist, no acute intervention since creatinine is improving    HTN  -BP normal, continue lisinopril, hydrochlorothiazide and monitor BP    Tobacco abuse  -on nicotine  -advised to stop smoking    Code status: Full Code  DVT prophylaxis: Lovenox    Care Plan discussed with: Patient/Family and Nurse  Disposition: TBD     Hospital Problems  Date Reviewed: 11/12/2018          Codes Class Noted POA    Lymphadenopathy ICD-10-CM: R59.1  ICD-9-CM: 785.6  11/12/2018 Unknown        * (Principal) Retroperitoneal mass ICD-10-CM: R19.00  ICD-9-CM: 789.30  11/10/2018 Unknown              Vital Signs:    Last 24hrs VS reviewed since prior progress note. Most recent are:  Visit Vitals  /84   Pulse 66   Temp 98.5 °F (36.9 °C)   Resp 18   Ht 5' 7\" (1.702 m)   Wt 77.1 kg (170 lb)   SpO2 94%   BMI 26.63 kg/m²         Intake/Output Summary (Last 24 hours) at 11/13/2018 1323  Last data filed at 11/13/2018 3022  Gross per 24 hour   Intake 4070 ml   Output --   Net 4070 ml        Physical Examination:             Constitutional:  No acute distress, cooperative, pleasant    ENT:  Oral mucous moist, no thrush   Resp:  CTA bilaterally, no wheezing, no crackles   CV:  Regular rhythm, normal rate, no murmurs, gallops, rubs    GI:  Soft, non distended, non tender. normoactive bowel sounds, no hepatosplenomegaly     Musculoskeletal:  No edema    Neurologic:  Moves all extremities. AAOx3, CN II-XII reviewed     Skin:  Good turgor, no rashes or ulcers       Data Review:    Review and/or order of clinical lab test      Labs:     Recent Labs     11/12/18 0041 11/11/18  0150   WBC 7.2 6.9   HGB 11.9* 12.2   HCT 36.3* 37.1    226     Recent Labs     11/12/18 0041 11/11/18  0150    143   K 3.6 4.0    110*   CO2 25 24   BUN 10 11   CREA 1.02 0.91   * 97   CA 8.6 8.5     Recent Labs     11/12/18 0041 11/11/18  0150   SGOT 7* 7*   ALT 12 12   AP 77 81   TBILI 0.2 0.2   TP 6.4 6.1*   ALB 2.9* 2.7*   GLOB 3.5 3.4     No results for input(s): INR, PTP, APTT in the last 72 hours.     No lab exists for component: INREXT, INREXT   No results for input(s): FE, TIBC, PSAT, FERR in the last 72 hours. No results found for: FOL, RBCF   No results for input(s): PH, PCO2, PO2 in the last 72 hours. No results for input(s): CPK, CKNDX, TROIQ in the last 72 hours.     No lab exists for component: CPKMB  Lab Results   Component Value Date/Time    Cholesterol, total 170 09/28/2016 04:40 AM    HDL Cholesterol 23 09/28/2016 04:40 AM    LDL, calculated 96.4 09/28/2016 04:40 AM    Triglyceride 253 (H) 09/28/2016 04:40 AM    CHOL/HDL Ratio 7.4 (H) 09/28/2016 04:40 AM     Lab Results   Component Value Date/Time    Glucose (POC) 98 06/05/2009 02:13 PM     Lab Results   Component Value Date/Time    Color STRAW 11/09/2018 09:49 PM    Appearance CLEAR 11/09/2018 09:49 PM    Specific gravity 1.016 11/09/2018 09:49 PM    Specific gravity 1.010 01/22/2017 11:20 AM    pH (UA) 6.5 11/09/2018 09:49 PM    Protein NEGATIVE  11/09/2018 09:49 PM    Glucose NEGATIVE  11/09/2018 09:49 PM    Ketone NEGATIVE  11/09/2018 09:49 PM    Bilirubin NEGATIVE  11/09/2018 09:49 PM    Urobilinogen 0.2 11/09/2018 09:49 PM    Nitrites NEGATIVE  11/09/2018 09:49 PM    Leukocyte Esterase NEGATIVE  11/09/2018 09:49 PM    Epithelial cells FEW 11/09/2018 09:49 PM    Bacteria NEGATIVE  11/09/2018 09:49 PM    WBC 0-4 11/09/2018 09:49 PM    RBC 0-5 11/09/2018 09:49 PM         Medications Reviewed:     Current Facility-Administered Medications   Medication Dose Route Frequency    sodium chloride (NS) flush 5-10 mL  5-10 mL IntraVENous Q8H    sodium chloride (NS) flush 5-10 mL  5-10 mL IntraVENous PRN    acetaminophen (TYLENOL) tablet 650 mg  650 mg Oral Q4H PRN    HYDROmorphone (DILAUDID) injection 1 mg  1 mg IntraVENous Q4H PRN    ondansetron (ZOFRAN) injection 4 mg  4 mg IntraVENous Q4H PRN    enoxaparin (LOVENOX) injection 40 mg  40 mg SubCUTAneous Q24H    oxyCODONE IR (ROXICODONE) tablet 5 mg  5 mg Oral Q4H PRN    senna-docusate (PERICOLACE) 8.6-50 mg per tablet 1 Tab  1 Tab Oral DAILY    nicotine (NICODERM CQ) 14 mg/24 hr patch 1 Patch  1 Patch TransDERmal DAILY    atorvastatin (LIPITOR) tablet 10 mg  10 mg Oral DAILY    hydroCHLOROthiazide (HYDRODIURIL) tablet 12.5 mg  12.5 mg Oral DAILY    lisinopril (PRINIVIL, ZESTRIL) tablet 10 mg  10 mg Oral DAILY    0.9% sodium chloride infusion  75 mL/hr IntraVENous CONTINUOUS     Facility-Administered Medications Ordered in Other Encounters   Medication Dose Route Frequency    lactated Ringers infusion   IntraVENous CONTINUOUS    midazolam (VERSED) injection   IntraVENous PRN    fentaNYL citrate (PF) injection    PRN    lidocaine (PF) (XYLOCAINE) 20 mg/mL (2 %) injection   IntraVENous PRN    ceFAZolin (ANCEF) injection   IntraVENous PRN    dexamethasone (DECADRON) 4 mg/mL injection    PRN    propofol (DIPRIVAN) 10 mg/mL injection   IntraVENous PRN    succinylcholine (ANECTINE) injection   IntraVENous PRN    glycopyrrolate (ROBINUL) injection   IntraVENous PRN     ______________________________________________________________________  EXPECTED LENGTH OF STAY: 2d 14h  ACTUAL LENGTH OF STAY:          3                 Elicia Salcido MD

## 2018-11-13 NOTE — H&P
Date of Surgery Update:  Juancarlos Clayton was seen and examined. History and physical has been reviewed. The patient has been examined. There have been no significant clinical changes since the completion of the originally dated History and Physical.  Patient identified by surgeon; surgical site was confirmed by patient and surgeon. Signed By: Nataliya Ballesteros MD     November 13, 2018 12:43 PM         Please note from the office and include the additional information below:    Past Medical History  Past Medical History:   Diagnosis Date    Anxiety     Hyperlipidemia     Hypertension     Lymphadenopathy 11/12/2018        Past Surgical History  History reviewed. No pertinent surgical history. Social History  The patient Juancarlos Clayton  reports that he has been smoking. He has a 40.00 pack-year smoking history. he has never used smokeless tobacco. He reports that he does not drink alcohol or use drugs.      Family History  Family History   Problem Relation Age of Onset    Hypertension Father

## 2018-11-13 NOTE — ANESTHESIA POSTPROCEDURE EVALUATION
Procedure(s):  INCISIONAL RIGHT INGUINAL LYMPH NODE BIOPSY.     <BSHSIANPOST>    Visit Vitals  /78   Pulse 63   Temp 36.6 °C (97.9 °F)   Resp 16   Ht 5' 7\" (1.702 m)   Wt 77.1 kg (170 lb)   SpO2 94%   BMI 26.63 kg/m²

## 2018-11-13 NOTE — ROUTINE PROCESS
Patient: Payton Huynh MRN: [id number]  SSN: [id number]   YOB: 1975  Age: 41 y.o. Sex: male     Patient is status post Procedure(s):  INCISIONAL RIGHT INGUINAL LYMPH NODE BIOPSY. Surgeon(s) and Role:     Alin Mayes MD - Primary    Local/Dose/Irrigation: 30ML 0.25% MARCAINE PLAIN INJECTED BY                   Peripheral IV 11/09/18 Left Antecubital (Active)   Site Assessment Clean, dry, & intact 11/13/2018  8:28 AM   Phlebitis Assessment 0 11/13/2018  8:28 AM   Infiltration Assessment 0 11/13/2018  8:28 AM   Dressing Status Clean, dry, & intact 11/13/2018  8:28 AM   Dressing Type Tape;Transparent 11/13/2018  8:28 AM   Hub Color/Line Status Pink; Infusing;Flushed;Patent 11/13/2018  8:28 AM   Action Taken Open ports on tubing capped 11/13/2018  8:28 AM   Alcohol Cap Used Yes 11/13/2018  8:28 AM       Peripheral IV 11/13/18 Left Forearm (Active)                           Dressing/Packing:  Wound Groin Right-DRESSING TYPE: Topical skin adhesive/glue (11/13/18 1300)

## 2018-11-13 NOTE — ANESTHESIA PREPROCEDURE EVALUATION
Anesthetic History   No history of anesthetic complications            Review of Systems / Medical History  Patient summary reviewed, nursing notes reviewed and pertinent labs reviewed    Pulmonary  Within defined limits                 Neuro/Psych   Within defined limits           Cardiovascular  Within defined limits  Hypertension                   GI/Hepatic/Renal  Within defined limits              Endo/Other  Within defined limits           Other Findings              Physical Exam    Airway  Mallampati: II  TM Distance: > 6 cm  Neck ROM: normal range of motion   Mouth opening: Normal     Cardiovascular  Regular rate and rhythm,  S1 and S2 normal,  no murmur, click, rub, or gallop             Dental    Dentition: Poor dentition  Comments: Multiple and black decayed teeth. Discussed with pt the increased likelihood of a tooth coming out.   Pt expressed understanding   Pulmonary  Breath sounds clear to auscultation               Abdominal  GI exam deferred       Other Findings            Anesthetic Plan    ASA: 2  Anesthesia type: general          Induction: Intravenous  Anesthetic plan and risks discussed with: Patient

## 2018-11-13 NOTE — PROGRESS NOTES
I paged Dr. Leal Close regarding the pts BP = 168/102. He gave me a TORB of Lisinopril 10 mg PO 1 x dose now. I have entered the order for him.

## 2018-11-13 NOTE — OP NOTES
[address]  OPERATIVE REPORT    Kristi Horvath  MR#: [id number]  : 1975  ACCOUNT #: [de-identified]   DATE OF SERVICE: 2018    PREOPERATIVE DIAGNOSIS:  Lymphadenopathy. POSTOPERATIVE DIAGNOSIS:  Lymphadenopathy. PROCEDURE PERFORMED:  Excisional biopsy, right inguinal lymph node. SURGEON:  Rudy Whitney MD    ASSISTANT:  Avelino Esteves SA    ANESTHESIA:  General endotracheal.    ESTIMATED BLOOD LOSS:  Minimal.    CRYSTALLOID:  700 mL. SPECIMENS REMOVED:  Right inguinal lymph node to pathology. DRAINS:  None. COMPLICATIONS:  None. IMPLANTS:  None. INDICATIONS FOR SURGERY:  The patient is a 61-year-old man with lymphadenopathy of unclear etiology. The patient was brought to the operating room at this time for excisional biopsy of a palpable right inguinal lymph node. The risks of the procedure including but not limited to infection and bleeding were discussed in detail with the patient. Mr. Meri Bravo understood and wished to proceed. PROCEDURE IN DETAIL:  After consent was obtained, the patient was brought to the operating room where he was placed in the supine position on the operating room table. Following the induction of an adequate level of general anesthesia via the endotracheal tube, compression devices were placed on both lower extremities. The right groin was prepped with ChloraPrep and draped as a sterile field. Local anesthetic was infiltrated and an incision over the enlarged lymph node was opened sharply. Subcutaneous bleeders were carefully cauterized. The incision was carried down to the enlarged lymph node which was readily identified and dissected free circumferentially. Lymphatic channels were clipped and divided. The specimen was excised, passed off the field and submitted for histopathologic evaluation. The wound was inspected and several bleeders cauterized.   The wound was irrigated copiously with saline, inspected and found to be hemostatic. The surgical incision was closed with 2 layers of running 2-0 Vicryl suture followed by 4-0 Monocryl subcuticular suture to the skin. Additional local anesthetic was infiltrated and the right groin incision was dressed with Dermabond. The patient was awakened from his general anesthetic and extubated in the operating room. He was transferred to his bed and brought to the recovery room in stable condition having tolerated the procedure well. At the conclusion of the procedure, all sponge counts, instrument counts and needle counts were reported as correct x2.       Sharri Cates MD       [facility] / [facility]  D: 11/13/2018 13:54     T: 11/13/2018 15:55  JOB #: [id number]  CC: Telly Lei MD  CC: Zuleima Fagan MD  CC: Yjaaira Barnes MD

## 2018-11-14 LAB
ANION GAP SERPL CALC-SCNC: 7 MMOL/L (ref 5–15)
BUN SERPL-MCNC: 14 MG/DL (ref 6–20)
BUN/CREAT SERPL: 14 (ref 12–20)
CALCIUM SERPL-MCNC: 9.2 MG/DL (ref 8.5–10.1)
CHLORIDE SERPL-SCNC: 107 MMOL/L (ref 97–108)
CO2 SERPL-SCNC: 26 MMOL/L (ref 21–32)
CREAT SERPL-MCNC: 0.98 MG/DL (ref 0.7–1.3)
GLUCOSE SERPL-MCNC: 124 MG/DL (ref 65–100)
POTASSIUM SERPL-SCNC: 4.1 MMOL/L (ref 3.5–5.1)
SODIUM SERPL-SCNC: 140 MMOL/L (ref 136–145)

## 2018-11-14 PROCEDURE — 36415 COLL VENOUS BLD VENIPUNCTURE: CPT

## 2018-11-14 PROCEDURE — 74011250637 HC RX REV CODE- 250/637: Performed by: INTERNAL MEDICINE

## 2018-11-14 PROCEDURE — 80048 BASIC METABOLIC PNL TOTAL CA: CPT

## 2018-11-14 PROCEDURE — 74011250637 HC RX REV CODE- 250/637: Performed by: HOSPITALIST

## 2018-11-14 PROCEDURE — 74011250636 HC RX REV CODE- 250/636: Performed by: HOSPITALIST

## 2018-11-14 PROCEDURE — 65270000029 HC RM PRIVATE

## 2018-11-14 RX ORDER — OXYCODONE HYDROCHLORIDE 5 MG/1
7.5 TABLET ORAL
Status: DISCONTINUED | OUTPATIENT
Start: 2018-11-14 | End: 2018-11-16 | Stop reason: HOSPADM

## 2018-11-14 RX ADMIN — Medication 10 ML: at 06:00

## 2018-11-14 RX ADMIN — ENOXAPARIN SODIUM 40 MG: 40 INJECTION SUBCUTANEOUS at 08:36

## 2018-11-14 RX ADMIN — Medication 10 ML: at 22:22

## 2018-11-14 RX ADMIN — Medication 10 ML: at 17:00

## 2018-11-14 RX ADMIN — OXYCODONE HYDROCHLORIDE 7.5 MG: 5 TABLET ORAL at 22:22

## 2018-11-14 RX ADMIN — OXYCODONE HYDROCHLORIDE 7.5 MG: 5 TABLET ORAL at 17:00

## 2018-11-14 RX ADMIN — SENNOSIDES AND DOCUSATE SODIUM 1 TABLET: 8.6; 5 TABLET ORAL at 08:32

## 2018-11-14 RX ADMIN — HYDROCHLOROTHIAZIDE 12.5 MG: 25 TABLET ORAL at 08:32

## 2018-11-14 RX ADMIN — ATORVASTATIN CALCIUM 10 MG: 10 TABLET, FILM COATED ORAL at 08:32

## 2018-11-14 RX ADMIN — OXYCODONE HYDROCHLORIDE 5 MG: 5 TABLET ORAL at 08:29

## 2018-11-14 RX ADMIN — LISINOPRIL 10 MG: 10 TABLET ORAL at 08:32

## 2018-11-14 NOTE — PROGRESS NOTES
Spiritual Care Partner Volunteer visited patient in [address] [place] [address] on 11/14/2018.   Documented by:  Chaplain Lemons MDiv, MS, [facility]  [phone] [phone])

## 2018-11-14 NOTE — PROGRESS NOTES
No complaints this AM.  Tm 98.6 HR: 65 BP: 149/81 Resp Rate: 18 99% sat on room air. Intake/Output Summary (Last 24 hours) at 11/14/2018 1016  Last data filed at 11/13/2018 1443  Gross per 24 hour   Intake 1000 ml   Output 100 ml   Net 900 ml   Exam: Cor: RRR. Lungs: Bilateral breath sounds. Clear to auscultation. Abd: Soft. Non tender. No guarding or rebound. Right groin: Incision is clean. Labs:   Recent Results (from the past 12 hour(s))   METABOLIC PANEL, BASIC    Collection Time: 11/14/18  3:11 AM   Result Value Ref Range    Sodium 140 136 - 145 mmol/L    Potassium 4.1 3.5 - 5.1 mmol/L    Chloride 107 97 - 108 mmol/L    CO2 26 21 - 32 mmol/L    Anion gap 7 5 - 15 mmol/L    Glucose 124 (H) 65 - 100 mg/dL    BUN 14 6 - 20 MG/DL    Creatinine 0.98 0.70 - 1.30 MG/DL    BUN/Creatinine ratio 14 12 - 20      GFR est AA >60 >60 ml/min/1.73m2    GFR est non-AA >60 >60 ml/min/1.73m2    Calcium 9.2 8.5 - 10.1 MG/DL   Diet as tolerated. Saline lock IVF. OOB, Ambulate. Awaiting pathology. Plans per Hospitalists. Following.

## 2018-11-14 NOTE — PROGRESS NOTES
-Hematology / Oncology ([facility]) -  -Primary Oncologist-   -CC-    -S-  S/p excisional biopsy. Path pending    -O-      Patient Vitals for the past 24 hrs:   Temp Pulse Resp BP SpO2   11/14/18 0834 98 °F (36.7 °C) 65 18 149/81 99 %   11/14/18 0218 97.3 °F (36.3 °C) 66 18 124/72 95 %   11/13/18 2012 98 °F (36.7 °C) 66 18 142/87 99 %   11/13/18 1621 98.6 °F (37 °C) 70 18 132/79 100 %   11/13/18 1508 98 °F (36.7 °C) 70 18 (!) 152/91 98 %   11/13/18 1445 -- 70 17 136/84 95 %   11/13/18 1444 97.9 °F (36.6 °C) -- -- -- --   11/13/18 1430 -- 63 16 130/78 94 %   11/13/18 1425 -- 74 17 -- 94 %   11/13/18 1420 -- 68 19 139/84 93 %   11/13/18 1415 -- 71 18 130/84 95 %   11/13/18 1411 97.7 °F (36.5 °C) 69 14 139/83 96 %   11/13/18 1410 -- 71 19 131/81 96 %   11/13/18 1408 -- 73 19 -- 97 %   11/13/18 1407 -- -- -- 139/83 95 %   11/13/18 1004 -- 66 18 146/84 94 %     No intake/output data recorded. Gen: nad  Chest: bilateral breath sounds present  Cardiac: rrr  Heme/lymph:  Large inguinal node  Abd: s/nt    -Labs-    Recent Labs     11/14/18  0311 11/12/18  0041   WBC  --  7.2   HGB  --  11.9*   PLT  --  234    141   K 4.1 3.6   * 106*   BUN 14 10   CREA 0.98 1.02   ALT  --  12   SGOT  --  7*   TBILI  --  0.2   AP  --  77   CA 9.2 8.6       -Imaging-     CT Abd  1. Interval development of a large retroperitoneal mass encircling the aorta  with invasion of the left renal hilum and left adrenal gland. Several adjacent  lymph nodes are seen extending into the peritoneum and underneath the  diaphragmatic natalie. This most likely represents lymphoma. 2. Several new bilateral enlarged inguinal lymph nodes also likely representing  lymphoma. 3. Moderate left hydronephrosis with a delayed renal nephrogram related to  decreased renal function. This is related to the invasion of the renal hilum.      -Assessment + Plan-     1. Retroperitoneal mass/lymphadenopathy.         -- biopsy results pending    2.  following

## 2018-11-14 NOTE — PROGRESS NOTES
Hospitalist Progress Note  Una Ramos MD  Answering service: [phone] OR [phone] from in house phone  Cell: [phone]      Date of Service:  2018  NAME:  Balaji Stoner  :  1975  MRN:  [id number]      Admission Summary:   A 41 y.o man with HTN who presented to an outside ER with low back pain x 3 days. The pain is constant, radiates to the buttocks, without exacerbating or alleviating factors, associated with increased urination, no nausea, vomiting or diarrhea. No dysuria or fever. He's unsure about weight loss. Work-up there revealed a retroperitoneal mass and he was transferred here for further work-up. Interval history / Subjective:   Pt seen for FU of retroperitoneal mass    Comfortable  Pain is well controlled   Had right inguinal lymph node biopsy  Afebrile  no chest pain or shortness of breath  Tobacco cessation advised  Dw nursing, orders reviewed     Assessment & Plan:     Retroperitoneal mass possible lymphoma  -CT abdomen interval development of a large retroperitoneal mass encircling the aorta  with invasion of the left renal hilum and left adrenal gland. Several adjacent lymph nodes are seen extending into the peritoneum and underneath the diaphragmatic natalie. This most likely represents lymphoma, several new bilateral enlarged inguinal lymph nodes   -Gen surg on board, inguinal LN biopsy   -Hem/Onc on board  - will DC IV dilaudid and increase po Oxycodone prn for pain control    : awaiting pathology report  Oncology decide discharge plan    Left hydronephrosis   -creatinine improved, cut back normal saline at 75 ml/hr  -CT showed moderate left hydronephrosis with a delayed renal nephrogram related to  decreased renal function.  This is related to the invasion of the renal hilum  -monitor renal function  -seen by Urologist, no acute intervention since creatinine is improving    HTN  -BP normal, continue lisinopril, hydrochlorothiazide and monitor BP    Tobacco abuse  -on nicotine  -advised to stop smoking    Code status: Full Code  DVT prophylaxis: Lovenox    Care Plan discussed with: Patient/Family and Nurse  Disposition: TBD     Hospital Problems  Date Reviewed: 11/12/2018          Codes Class Noted POA    Lymphadenopathy ICD-10-CM: R59.1  ICD-9-CM: 785.6  11/12/2018 Unknown        * (Principal) Retroperitoneal mass ICD-10-CM: R19.00  ICD-9-CM: 789.30  11/10/2018 Unknown              Vital Signs:    Last 24hrs VS reviewed since prior progress note. Most recent are:  Visit Vitals  /88 (BP 1 Location: Right arm, BP Patient Position: At rest)   Pulse 95   Temp 98.5 °F (36.9 °C)   Resp 17   Ht 5' 7\" (1.702 m)   Wt 77.1 kg (170 lb)   SpO2 97%   BMI 26.63 kg/m²       No intake or output data in the 24 hours ending 11/14/18 1500     Physical Examination:             Constitutional:  No acute distress, cooperative, pleasant    ENT:  Oral mucous moist, no thrush   Resp:  CTA bilaterally, no wheezing, no crackles   CV:  Regular rhythm, normal rate, no murmurs, gallops, rubs    GI:  Soft, non distended, non tender. normoactive bowel sounds, no hepatosplenomegaly     Musculoskeletal:  No edema    Neurologic:  Moves all extremities. AAOx3, CN II-XII reviewed     Skin:  Good turgor, no rashes or ulcers       Data Review:    Review and/or order of clinical lab test      Labs:     Recent Labs     11/12/18  0041   WBC 7.2   HGB 11.9*   HCT 36.3*        Recent Labs     11/14/18  0311 11/12/18  0041    141   K 4.1 3.6    108   CO2 26 25   BUN 14 10   CREA 0.98 1.02   * 106*   CA 9.2 8.6     Recent Labs     11/12/18  0041   SGOT 7*   ALT 12   AP 77   TBILI 0.2   TP 6.4   ALB 2.9*   GLOB 3.5     No results for input(s): INR, PTP, APTT in the last 72 hours. No lab exists for component: INREXT, INREXT   No results for input(s): FE, TIBC, PSAT, FERR in the last 72 hours.    No results found for: FOL, RBCF   No results for input(s): PH, PCO2, PO2 in the last 72 hours. No results for input(s): CPK, CKNDX, TROIQ in the last 72 hours.     No lab exists for component: CPKMB  Lab Results   Component Value Date/Time    Cholesterol, total 170 09/28/2016 04:40 AM    HDL Cholesterol 23 09/28/2016 04:40 AM    LDL, calculated 96.4 09/28/2016 04:40 AM    Triglyceride 253 (H) 09/28/2016 04:40 AM    CHOL/HDL Ratio 7.4 (H) 09/28/2016 04:40 AM     Lab Results   Component Value Date/Time    Glucose (POC) 98 06/05/2009 02:13 PM     Lab Results   Component Value Date/Time    Color STRAW 11/09/2018 09:49 PM    Appearance CLEAR 11/09/2018 09:49 PM    Specific gravity 1.016 11/09/2018 09:49 PM    Specific gravity 1.010 01/22/2017 11:20 AM    pH (UA) 6.5 11/09/2018 09:49 PM    Protein NEGATIVE  11/09/2018 09:49 PM    Glucose NEGATIVE  11/09/2018 09:49 PM    Ketone NEGATIVE  11/09/2018 09:49 PM    Bilirubin NEGATIVE  11/09/2018 09:49 PM    Urobilinogen 0.2 11/09/2018 09:49 PM    Nitrites NEGATIVE  11/09/2018 09:49 PM    Leukocyte Esterase NEGATIVE  11/09/2018 09:49 PM    Epithelial cells FEW 11/09/2018 09:49 PM    Bacteria NEGATIVE  11/09/2018 09:49 PM    WBC 0-4 11/09/2018 09:49 PM    RBC 0-5 11/09/2018 09:49 PM         Medications Reviewed:     Current Facility-Administered Medications   Medication Dose Route Frequency    acetaminophen (TYLENOL) tablet 650 mg  650 mg Oral Q4H PRN    prochlorperazine (COMPAZINE) with saline injection 5 mg  5 mg IntraVENous Q6H PRN    sodium chloride (NS) flush 5-10 mL  5-10 mL IntraVENous Q8H    sodium chloride (NS) flush 5-10 mL  5-10 mL IntraVENous PRN    HYDROmorphone (DILAUDID) injection 1 mg  1 mg IntraVENous Q4H PRN    enoxaparin (LOVENOX) injection 40 mg  40 mg SubCUTAneous Q24H    oxyCODONE IR (ROXICODONE) tablet 5 mg  5 mg Oral Q4H PRN    senna-docusate (PERICOLACE) 8.6-50 mg per tablet 1 Tab  1 Tab Oral DAILY    nicotine (NICODERM CQ) 14 mg/24 hr patch 1 Patch  1 Patch TransDERmal DAILY    atorvastatin (LIPITOR) tablet 10 mg  10 mg Oral DAILY    hydroCHLOROthiazide (HYDRODIURIL) tablet 12.5 mg  12.5 mg Oral DAILY    lisinopril (PRINIVIL, ZESTRIL) tablet 10 mg  10 mg Oral DAILY     ______________________________________________________________________  EXPECTED LENGTH OF STAY: 4d 16h  ACTUAL LENGTH OF STAY:          4                 Mark Yin MD

## 2018-11-15 PROCEDURE — 74011250636 HC RX REV CODE- 250/636: Performed by: HOSPITALIST

## 2018-11-15 PROCEDURE — 65270000029 HC RM PRIVATE

## 2018-11-15 PROCEDURE — 74011250637 HC RX REV CODE- 250/637: Performed by: INTERNAL MEDICINE

## 2018-11-15 PROCEDURE — 74011250637 HC RX REV CODE- 250/637: Performed by: HOSPITALIST

## 2018-11-15 RX ADMIN — Medication 10 ML: at 16:13

## 2018-11-15 RX ADMIN — ATORVASTATIN CALCIUM 10 MG: 10 TABLET, FILM COATED ORAL at 08:43

## 2018-11-15 RX ADMIN — ENOXAPARIN SODIUM 40 MG: 40 INJECTION SUBCUTANEOUS at 08:43

## 2018-11-15 RX ADMIN — SENNOSIDES AND DOCUSATE SODIUM 1 TABLET: 8.6; 5 TABLET ORAL at 08:43

## 2018-11-15 RX ADMIN — HYDROCHLOROTHIAZIDE 12.5 MG: 25 TABLET ORAL at 08:43

## 2018-11-15 RX ADMIN — OXYCODONE HYDROCHLORIDE 7.5 MG: 5 TABLET ORAL at 08:37

## 2018-11-15 RX ADMIN — LISINOPRIL 10 MG: 10 TABLET ORAL at 08:43

## 2018-11-15 RX ADMIN — OXYCODONE HYDROCHLORIDE 7.5 MG: 5 TABLET ORAL at 13:49

## 2018-11-15 NOTE — PROGRESS NOTES
Bedside shift change report given to [address] [place] [address] Drive (oncoming nurse) by Leilani Jimenez RN (offgoing nurse). Report included the following information SBAR, Kardex, ED Summary, Procedure Summary, Intake/Output, MAR and Recent Results.

## 2018-11-15 NOTE — PROGRESS NOTES
Bedside shift change report given to ADA Murillo (oncoming nurse) by ADA Mcgill (offgoing nurse). Report included the following information SBAR, Kardex and Recent Results.

## 2018-11-15 NOTE — PROGRESS NOTES
Hospitalist Progress Note  Aminta Up MD  Answering service: [phone] OR [phone] from in house phone  Cell: [phone]      Date of Service:  11/15/2018  NAME:  Lupillo Shetty  :  1975  MRN:  [id number]      Admission Summary:   A 41 y.o man with HTN who presented to an outside ER with low back pain x 3 days. The pain is constant, radiates to the buttocks, without exacerbating or alleviating factors, associated with increased urination, no nausea, vomiting or diarrhea. No dysuria or fever. He's unsure about weight loss. Work-up there revealed a retroperitoneal mass and he was transferred here for further work-up. Interval history / Subjective:   Pt seen for FU of retroperitoneal mass    Comfortable  Pain is well controlled   No acute issues overnight  No nausea, vomiting, diarrhea  Afebrile  no chest pain or shortness of breath  Tobacco cessation advised  Dw nursing, orders reviewed     Assessment & Plan:     Retroperitoneal mass possible lymphoma  -CT abdomen interval development of a large retroperitoneal mass encircling the aorta  with invasion of the left renal hilum and left adrenal gland. Several adjacent lymph nodes are seen extending into the peritoneum and underneath the diaphragmatic natalie. This most likely represents lymphoma, several new bilateral enlarged inguinal lymph nodes   -Gen surg on board, inguinal LN biopsy   -Hem/Onc on board  - OFF IV dilaudid   Cont  po Oxycodone prn for pain control    : awaiting pathology report  Oncology decide discharge plan    11/15: [facility], patient could be discharged in am, they will arrange for follow up early next week, likely monday    Left hydronephrosis   -creatinine improved, cut back normal saline at 75 ml/hr  -CT showed moderate left hydronephrosis with a delayed renal nephrogram related to  decreased renal function.  This is related to the invasion of the renal hilum  -monitor renal function  -seen by Urologist, no acute intervention since creatinine is improving    HTN  -BP normal, continue lisinopril, hydrochlorothiazide and monitor BP    Tobacco abuse  -on nicotine  -advised to stop smoking    Code status: Full Code  DVT prophylaxis: Lovenox    Care Plan discussed with: Patient/Family and Nurse  Disposition: TBD     Hospital Problems  Date Reviewed: 11/12/2018          Codes Class Noted POA    Lymphadenopathy ICD-10-CM: R59.1  ICD-9-CM: 785.6  11/12/2018 Unknown        * (Principal) Retroperitoneal mass ICD-10-CM: R19.00  ICD-9-CM: 789.30  11/10/2018 Unknown              Vital Signs:    Last 24hrs VS reviewed since prior progress note. Most recent are:  Visit Vitals  BP (!) 141/96 (BP 1 Location: Right arm, BP Patient Position: At rest)   Pulse 60   Temp 98 °F (36.7 °C)   Resp 16   Ht 5' 7\" (1.702 m)   Wt 77.1 kg (170 lb)   SpO2 98%   BMI 26.63 kg/m²       No intake or output data in the 24 hours ending 11/15/18 1422     Physical Examination:             Constitutional:  No acute distress, cooperative, pleasant    ENT:  Oral mucous moist, no thrush   Resp:  CTA bilaterally, no wheezing, no crackles   CV:  Regular rhythm, normal rate, no murmurs, gallops, rubs    GI:  Soft, non distended, non tender. normoactive bowel sounds, no hepatosplenomegaly     Musculoskeletal:  No edema    Neurologic:  Moves all extremities. AAOx3, CN II-XII reviewed     Skin:  Good turgor, no rashes or ulcers       Data Review:    Review and/or order of clinical lab test      Labs:     No results for input(s): WBC, HGB, HCT, PLT, HGBEXT, HCTEXT, PLTEXT, HGBEXT, HCTEXT, PLTEXT in the last 72 hours. Recent Labs     11/14/18  0311      K 4.1      CO2 26   BUN 14   CREA 0.98   *   CA 9.2     No results for input(s): SGOT, GPT, ALT, AP, TBIL, TBILI, TP, ALB, GLOB, GGT, AML, LPSE in the last 72 hours.     No lab exists for component: AMYP, HLPSE  No results for input(s): INR, PTP, APTT in the last 72 hours. No lab exists for component: INREXT, INREXT   No results for input(s): FE, TIBC, PSAT, FERR in the last 72 hours. No results found for: FOL, RBCF   No results for input(s): PH, PCO2, PO2 in the last 72 hours. No results for input(s): CPK, CKNDX, TROIQ in the last 72 hours.     No lab exists for component: CPKMB  Lab Results   Component Value Date/Time    Cholesterol, total 170 09/28/2016 04:40 AM    HDL Cholesterol 23 09/28/2016 04:40 AM    LDL, calculated 96.4 09/28/2016 04:40 AM    Triglyceride 253 (H) 09/28/2016 04:40 AM    CHOL/HDL Ratio 7.4 (H) 09/28/2016 04:40 AM     Lab Results   Component Value Date/Time    Glucose (POC) 98 06/05/2009 02:13 PM     Lab Results   Component Value Date/Time    Color STRAW 11/09/2018 09:49 PM    Appearance CLEAR 11/09/2018 09:49 PM    Specific gravity 1.016 11/09/2018 09:49 PM    Specific gravity 1.010 01/22/2017 11:20 AM    pH (UA) 6.5 11/09/2018 09:49 PM    Protein NEGATIVE  11/09/2018 09:49 PM    Glucose NEGATIVE  11/09/2018 09:49 PM    Ketone NEGATIVE  11/09/2018 09:49 PM    Bilirubin NEGATIVE  11/09/2018 09:49 PM    Urobilinogen 0.2 11/09/2018 09:49 PM    Nitrites NEGATIVE  11/09/2018 09:49 PM    Leukocyte Esterase NEGATIVE  11/09/2018 09:49 PM    Epithelial cells FEW 11/09/2018 09:49 PM    Bacteria NEGATIVE  11/09/2018 09:49 PM    WBC 0-4 11/09/2018 09:49 PM    RBC 0-5 11/09/2018 09:49 PM         Medications Reviewed:     Current Facility-Administered Medications   Medication Dose Route Frequency    oxyCODONE IR (ROXICODONE) tablet 7.5 mg  7.5 mg Oral Q4H PRN    acetaminophen (TYLENOL) tablet 650 mg  650 mg Oral Q4H PRN    prochlorperazine (COMPAZINE) with saline injection 5 mg  5 mg IntraVENous Q6H PRN    sodium chloride (NS) flush 5-10 mL  5-10 mL IntraVENous Q8H    sodium chloride (NS) flush 5-10 mL  5-10 mL IntraVENous PRN    enoxaparin (LOVENOX) injection 40 mg  40 mg SubCUTAneous Q24H    senna-docusate (PERICOLACE) 8.6-50 mg per tablet 1 Tab  1 Tab Oral DAILY    nicotine (NICODERM CQ) 14 mg/24 hr patch 1 Patch  1 Patch TransDERmal DAILY    atorvastatin (LIPITOR) tablet 10 mg  10 mg Oral DAILY    hydroCHLOROthiazide (HYDRODIURIL) tablet 12.5 mg  12.5 mg Oral DAILY    lisinopril (PRINIVIL, ZESTRIL) tablet 10 mg  10 mg Oral DAILY     ______________________________________________________________________  EXPECTED LENGTH OF STAY: 4d 16h  ACTUAL LENGTH OF STAY:          5                 Gatito Villasenor MD

## 2018-11-15 NOTE — PROGRESS NOTES
[facility] General Surgery      Subjective     No complaints. States his incisional pain is minimal and well controlled. Wants to go home. Objective     Patient Vitals for the past 24 hrs:   Temp Pulse Resp BP SpO2   11/15/18 0841 98 °F (36.7 °C) 60 16 (!) 141/96 98 %   11/15/18 0304 97.7 °F (36.5 °C) 61 17 120/74 98 %   11/14/18 2009 98.2 °F (36.8 °C) 69 17 134/81 97 %            PE  GEN - Awake, alert, communicating appropriately. NAD  Right inguinal lymph node biopsy incision c/d/i. No seroma or signs of infection. Labs  No results found for this or any previous visit (from the past 24 hour(s)). Assessment     Payton Huynh is a 41 y. o.yr old male s/p right inguinal lymph node biopsy for concern of lymphoma. Plan     - Pathology pending.   - Dispo and care per primary team  - No wound care required.      Judi Goncalves MD  11/15/2018  5:26 PM

## 2018-11-16 VITALS
DIASTOLIC BLOOD PRESSURE: 79 MMHG | SYSTOLIC BLOOD PRESSURE: 126 MMHG | HEIGHT: 67 IN | OXYGEN SATURATION: 95 % | RESPIRATION RATE: 17 BRPM | WEIGHT: 170 LBS | HEART RATE: 71 BPM | BODY MASS INDEX: 26.68 KG/M2 | TEMPERATURE: 98.1 F

## 2018-11-16 PROBLEM — C82.90 FOLLICULAR LYMPHOMA (HCC): Status: ACTIVE | Noted: 2018-11-16

## 2018-11-16 PROCEDURE — 74011250637 HC RX REV CODE- 250/637: Performed by: INTERNAL MEDICINE

## 2018-11-16 PROCEDURE — 74011250637 HC RX REV CODE- 250/637: Performed by: HOSPITALIST

## 2018-11-16 PROCEDURE — 74011250636 HC RX REV CODE- 250/636: Performed by: HOSPITALIST

## 2018-11-16 RX ORDER — OXYCODONE HYDROCHLORIDE 5 MG/1
7.5 TABLET ORAL
Qty: 10 TAB | Refills: 0 | Status: ON HOLD | OUTPATIENT
Start: 2018-11-16 | End: 2018-12-20

## 2018-11-16 RX ORDER — NALOXONE HYDROCHLORIDE 4 MG/.1ML
SPRAY NASAL
Qty: 2 EACH | Refills: 0 | Status: SHIPPED | OUTPATIENT
Start: 2018-11-16 | End: 2019-09-11

## 2018-11-16 RX ORDER — IBUPROFEN 200 MG
1 TABLET ORAL DAILY
Qty: 14 PATCH | Refills: 0 | Status: SHIPPED | OUTPATIENT
Start: 2018-11-17 | End: 2018-12-17

## 2018-11-16 RX ADMIN — SENNOSIDES AND DOCUSATE SODIUM 1 TABLET: 8.6; 5 TABLET ORAL at 08:43

## 2018-11-16 RX ADMIN — LISINOPRIL 10 MG: 10 TABLET ORAL at 08:43

## 2018-11-16 RX ADMIN — ATORVASTATIN CALCIUM 10 MG: 10 TABLET, FILM COATED ORAL at 08:42

## 2018-11-16 RX ADMIN — HYDROCHLOROTHIAZIDE 12.5 MG: 25 TABLET ORAL at 08:42

## 2018-11-16 RX ADMIN — ENOXAPARIN SODIUM 40 MG: 40 INJECTION SUBCUTANEOUS at 08:42

## 2018-11-16 RX ADMIN — OXYCODONE HYDROCHLORIDE 7.5 MG: 5 TABLET ORAL at 08:42

## 2018-11-16 NOTE — PROGRESS NOTES
I have reviewed discharge instructions with the patien to include DC prescriptions and follow-up appts to be scheduled with [facility] for port, BM Biopsey and PET Scan.  Instructed pt to call their office Monday 11/19/18 afternoon if they have not contacted him  The patient verbalized understanding of all DC instructions

## 2018-11-16 NOTE — DISCHARGE SUMMARY
Inpatient hospitalist discharge summary                Brief Overview    PATIENT ID: Ann Hutton    MRN: [id number]     YOB: 1975    Admitting Provider: Johan Johnson MD    Discharging Provider: Puja Mir MD   To contact this individual call [phone] and ask the  to page. If unavailable ask to be transferred the Adult Hospitalist Department. PCP at discharge: None None   None (395) Patient stated that they have no PCP    Admission date: 11/10/2018  Date of Discharge: 11/16/18    Chief complaint: No chief complaint on file. Patient Active Problem List   Diagnosis Code    HTN (hypertension) I10    Tobacco abuse Z72.0    Left sided numbness R20.0    Hyperlipidemia E78.5    Retroperitoneal mass R19.00    Lymphadenopathy M81.6    Follicular lymphoma ([facility] [address] C82.90         Primary discharge diagnosis:  Retroperitoneal lymphadenopathy s/p inguinal node biopsy: Biopsy proven Follicular Lymphoma Grade 3, POA  DW Dr. Partha Stinson, outpatient PET, PORT and BMB, his office will arrange it    Secondary discharge diagnosis:  Left hydronephrosis: CT showed moderate left hydronephrosis with a delayed renal nephrogram related to decreased renal function. This is related to the invasion of the renal hilum, -seen by Urologist, no acute intervention since creatinine is improving     HTN  -BP normal, continue lisinopril, hydrochlorothiazide     Tobacco abuse  -on nicotine  -advised to stop smoking      Discharge Disposition:  home    Discharge activity:  Ambulate in house and no driving on opiates    Code status at discharge:  Full Code     Active issues requiring follow up:  Lymphoma    Outpatient follow up:    No future appointments.   Follow-up Information     Follow up With Specialties Details Why Contact Info    None    None (395) Patient stated that they have no PCP      Arelis Mak MD Hematology and Oncology, Hematology, Oncology Schedule an appointment as soon as possible for a visit Dr. [address] Walker office will make arrangement for Follow up and further care [address]  [phone]      Luci Osuna MD Urology Schedule an appointment as soon as possible for a visit  [address]   [phone]            Test results pending upon discharge:  n/a          Details of hospital stay      Presenting problem/ History of present illness:  lymphoma  Retroperitoneal mass    41 y.o man w/ HTN who presented to an outside ER with low back pain x 3 days. The pain is constant, radiates to the buttocks, without exacerbating or alleviating factors, associated w/ increased urination, no n/v/d, no dysuria, no fever, he's unsure about weight loss. Work-up there revealed a retroperitoneal mass and he was transferred here for further work-up. Hospital Course:  Pt was seen by urology for hydronephrosis, no acute surgery given IVF improved the renal function and likely it is due to lymphoma. Seen by Heme/onc and gen surgery, right inguinal LN biopsy done, which shows follicular lymphoma grade 3, Onc will arrange for PORT,PET, BMB as outpatient. Patient required opiates for pain control. Patient will be discharged to home today in stable condition. DW Onc today    On the date of discharge, diagnostic face to face encounter was performed. Patient was hemodynamically stable, offering no new complaints. Denies any shortness of breath at rest, no fevers or chills, no diarrhea or constipation. Patient is agreeable for discharge. Patient understood and verbalized the understanding of the discharge plan. Patient was advised to seek medical help/ care or return to ED, if symptoms recur, worsen or new symptoms develop.       Operative procedures performed:  Procedure(s):  INCISIONAL RIGHT INGUINAL LYMPH NODE BIOPSY    Treatments: IV hydration and analgesia: Dilaudid and Oxycodone    Consults:  IP CONSULT TO ONCOLOGY  IP CONSULT TO UROLOGY  IP CONSULT TO GENERAL SURGERY    Procedures:    Procedure(s):  INCISIONAL RIGHT INGUINAL LYMPH NODE BIOPSY    Diet:  Regular Diet    Pertinent test results:  Xr Spine Lumb 2 Or 3 V    Result Date: 11/9/2018  INDICATION: Back Pain, urinary frequency beginning Wednesday. Lower back pain wraps around to buttocks. EXAM: Lumbar spine radiographs, 3 views. COMPARISON:  None . FINDINGS: A three-view examination of the lumbar spine reveals normal bony alignment. Bone mineral content is normal for age . There is no obvious acute fracture or dislocation. Vertebral body heights  and disc space heights   are preserved. Mild multilevel spondylosis. .  Pedicles and sacroiliac joints are intact, as are visualized sacral foramina. IMPRESSION: No acute bony abnormality of the lumbar spine. Ct Chest W Cont    Result Date: 11/11/2018  INDICATION: lymphadenopathy, abnormal abdomen CT COMPARISON: Abdomen CT 11/9/2018 TECHNIQUE:  Following the uneventful intravenous administration of 100 cc Isovue-300, 5 mm axial images were obtained through the chest. Coronal and sagittal reconstructions were generated. CT dose reduction was achieved through use of a standardized protocol tailored for this examination and automatic exposure control for dose modulation. FINDINGS: THYROID: No nodule. MEDIASTINUM: No mass or lymphadenopathy. SB: No mass or lymphadenopathy. THORACIC AORTA: No dissection or aneurysm. MAIN PULMONARY ARTERY: Normal in caliber. TRACHEA/BRONCHI: Patent. ESOPHAGUS: No wall thickening or dilatation. HEART: Normal in size. PLEURA: There is a small left pleural effusion. LUNGS: Bilateral lower lobe atelectasis is noted. INCIDENTALLY IMAGED UPPER ABDOMEN: Again demonstrated is a large retroperitoneal mass lesion encasing the celiac axis and invading the posterior aspect of the pancreas. BONES: No destructive bone lesion. IMPRESSION: Trace left pleural effusion.  Bilateral lower lobe atelectasis. Large retroperitoneal mass lesion again demonstrated. Ct Abd W Cont And Pelvis W Wo Cont    Result Date: 11/9/2018  EXAM:  CT ABD W CONT AND PELVIS W WO CONT INDICATION: low back pain/ HTN COMPARISON: 6/5/2009 CONTRAST:  100 mL of Isovue-370. TECHNIQUE: Following the uneventful intravenous administration of contrast, thin axial images were obtained through the abdomen and pelvis. Coronal and sagittal reconstructions were generated. Oral contrast was not administered. Delayed images were acquired. CT dose reduction was achieved through use of a standardized protocol tailored for this examination and automatic exposure control for dose modulation. FINDINGS: LUNG BASES: Clear. INCIDENTALLY IMAGED HEART AND MEDIASTINUM: Unremarkable. LIVER: No mass or biliary dilatation. GALLBLADDER: Unremarkable. SPLEEN: No mass. PANCREAS: No mass or ductal dilatation. ADRENALS: The left adrenal gland is elevated by the retroperitoneal mass. The right is unremarkable. KIDNEYS: There is invasion of the left renal hilum by the retroperitoneal mass. There is moderate left hydroureter nephrosis. The nephrogram is delayed related to decreased function. STOMACH: Unremarkable. SMALL BOWEL: No dilatation or wall thickening. COLON: No dilatation or wall thickening. APPENDIX: Unremarkable. PERITONEUM: No ascites or pneumoperitoneum. RETROPERITONEUM: There is a large mass in the retroperitoneum that encircles the aorta and lifts it away from the spine. This measures approximately 11.3 cm AP by 12.4 cm transverse by 14.2 cm craniocaudal. The mass contacts the pancreas. There is invasion of the medial left kidney extending into the renal hilum. Areas of heterogeneous hypodensity are seen on the left side of mass. The renal vasculature is in the left. The SMA, celiac, and LIZZY are developed. Multiple small lymph nodes are seen extending into the peritoneum. There is stranding on the retroperitoneum.  There is a 1.2 cm enlarged lymph node underneath the diaphragmatic natalie. REPRODUCTIVE ORGANS: The prostate and seminal vesicles are unremarkable. A small amount of free fluid is seen in the deep pelvis. URINARY BLADDER: No mass or calculus. BONES: No destructive bone lesion. ADDITIONAL COMMENTS: There are bilateral enlarged inguinal lymph nodes. The largest in the right measures 2.1 cm. The largest in the left measures 1.3 cm. IMPRESSION: 1. Interval development of a large retroperitoneal mass encircling the aorta with invasion of the left renal hilum and left adrenal gland. Several adjacent lymph nodes are seen extending into the peritoneum and underneath the diaphragmatic natalie. This most likely represents lymphoma. 2. Several new bilateral enlarged inguinal lymph nodes also likely representing lymphoma. 3. Moderate left hydronephrosis with a delayed renal nephrogram related to decreased renal function. This is related to the invasion of the renal hilum.  The case was discussed with Dr. Mikel Osorio at 11:15 PM on 11/9/2018       Recent Results (from the past 336 hour(s))   EKG, 12 LEAD, INITIAL    Collection Time: 11/09/18  9:48 PM   Result Value Ref Range    Ventricular Rate 94 BPM    Atrial Rate 94 BPM    P-R Interval 122 ms    QRS Duration 86 ms    Q-T Interval 366 ms    QTC Calculation (Bezet) 457 ms    Calculated P Axis 32 degrees    Calculated R Axis 33 degrees    Calculated T Axis 39 degrees    Diagnosis       Normal sinus rhythm  Normal ECG  When compared with ECG of 30-JUL-2018 07:32,  T wave inversion no longer evident in Inferior leads  Confirmed by Dina Gilliam MD, [id number] ([id number]) on 11/10/2018 11:08:53 AM     URINALYSIS W/ RFLX MICROSCOPIC    Collection Time: 11/09/18  9:49 PM   Result Value Ref Range    Color STRAW      Appearance CLEAR CLEAR      Specific gravity 1.016 1.003 - 1.030      pH (UA) 6.5 5.0 - 8.0      Protein NEGATIVE  NEG mg/dL    Glucose NEGATIVE  NEG mg/dL    Ketone NEGATIVE  NEG mg/dL    Bilirubin NEGATIVE  NEG Blood NEGATIVE  NEG      Urobilinogen 0.2 0.2 - 1.0 EU/dL    Nitrites NEGATIVE  NEG      Leukocyte Esterase NEGATIVE  NEG      WBC 0-4 0 - 4 /hpf    RBC 0-5 0 - 5 /hpf    Epithelial cells FEW FEW /lpf    Bacteria NEGATIVE  NEG /hpf   METABOLIC PANEL, COMPREHENSIVE    Collection Time: 11/09/18  9:49 PM   Result Value Ref Range    Sodium 138 136 - 145 mmol/L    Potassium 3.6 3.5 - 5.1 mmol/L    Chloride 101 97 - 108 mmol/L    CO2 25 21 - 32 mmol/L    Anion gap 12 5 - 15 mmol/L    Glucose 98 65 - 100 mg/dL    BUN 8 6 - 20 MG/DL    Creatinine 1.17 0.70 - 1.30 MG/DL    BUN/Creatinine ratio 7 (L) 12 - 20      GFR est AA >60 >60 ml/min/1.73m2    GFR est non-AA >60 >60 ml/min/1.73m2    Calcium 8.9 8.5 - 10.1 MG/DL    Bilirubin, total 0.2 0.2 - 1.0 MG/DL    ALT (SGPT) 14 12 - 78 U/L    AST (SGOT) 10 (L) 15 - 37 U/L    Alk. phosphatase 98 45 - 117 U/L    Protein, total 7.1 6.4 - 8.2 g/dL    Albumin 3.3 (L) 3.5 - 5.0 g/dL    Globulin 3.8 2.0 - 4.0 g/dL    A-G Ratio 0.9 (L) 1.1 - 2.2     CBC WITH AUTOMATED DIFF    Collection Time: 11/09/18  9:49 PM   Result Value Ref Range    WBC 10.6 4.1 - 11.1 K/uL    RBC 4.34 4.10 - 5.70 M/uL    HGB 13.5 12.1 - 17.0 g/dL    HCT 39.8 36.6 - 50.3 %    MCV 91.7 80.0 - 99.0 FL    MCH 31.1 26.0 - 34.0 PG    MCHC 33.9 30.0 - 36.5 g/dL    RDW 14.8 (H) 11.5 - 14.5 %    PLATELET 560 423 - 105 K/uL    MPV 10.8 8.9 - 12.9 FL    NEUTROPHILS 75 32 - 75 %    LYMPHOCYTES 14 12 - 49 %    MONOCYTES 9 5 - 13 %    EOSINOPHILS 2 0 - 7 %    BASOPHILS 0 0 - 1 %    ABS. NEUTROPHILS 7.9 1.8 - 8.0 K/UL    ABS. LYMPHOCYTES 1.5 K/UL    ABS. MONOCYTES 0.9 0.0 - 1.0 K/UL    ABS. EOSINOPHILS 0.2 0.0 - 0.4 K/UL    ABS.  BASOPHILS 0.0 0.0 - 0.1 K/UL    DF AUTOMATED      XXWBCSUS 0     TROPONIN I    Collection Time: 11/09/18  9:49 PM   Result Value Ref Range    Troponin-I, Qt. <0.05 <0.05 ng/mL   PROTHROMBIN TIME + INR    Collection Time: 11/09/18  9:49 PM   Result Value Ref Range    INR 1.0 0.9 - 1.1      Prothrombin time 9.5 9.0 - 11.1 sec   LIPASE    Collection Time: 11/09/18  9:49 PM   Result Value Ref Range    Lipase 198 73 - 393 U/L   AFP, TUMOR MARKER    Collection Time: 11/10/18  3:41 AM   Result Value Ref Range    AFP, Serum, Tumor Marker 4.1 0.0 - 8.3 ng/mL   BETA-HCG, TUMOR MARKER    Collection Time: 11/10/18  3:41 AM   Result Value Ref Range    HCG, beta, QT <1 0 - 3 mIU/mL   LD    Collection Time: 11/11/18  1:50 AM   Result Value Ref Range     85 - 241 U/L   CBC W/O DIFF    Collection Time: 11/11/18  1:50 AM   Result Value Ref Range    WBC 6.9 4.1 - 11.1 K/uL    RBC 3.97 (L) 4.10 - 5.70 M/uL    HGB 12.2 12.1 - 17.0 g/dL    HCT 37.1 36.6 - 50.3 %    MCV 93.5 80.0 - 99.0 FL    MCH 30.7 26.0 - 34.0 PG    MCHC 32.9 30.0 - 36.5 g/dL    RDW 14.6 (H) 11.5 - 14.5 %    PLATELET 522 380 - 638 K/uL    MPV 10.8 8.9 - 12.9 FL    NRBC 0.0 0  WBC    ABSOLUTE NRBC 0.00 0.00 - 1.59 K/uL   METABOLIC PANEL, COMPREHENSIVE    Collection Time: 11/11/18  1:50 AM   Result Value Ref Range    Sodium 143 136 - 145 mmol/L    Potassium 4.0 3.5 - 5.1 mmol/L    Chloride 110 (H) 97 - 108 mmol/L    CO2 24 21 - 32 mmol/L    Anion gap 9 5 - 15 mmol/L    Glucose 97 65 - 100 mg/dL    BUN 11 6 - 20 MG/DL    Creatinine 0.91 0.70 - 1.30 MG/DL    BUN/Creatinine ratio 12 12 - 20      GFR est AA >60 >60 ml/min/1.73m2    GFR est non-AA >60 >60 ml/min/1.73m2    Calcium 8.5 8.5 - 10.1 MG/DL    Bilirubin, total 0.2 0.2 - 1.0 MG/DL    ALT (SGPT) 12 12 - 78 U/L    AST (SGOT) 7 (L) 15 - 37 U/L    Alk.  phosphatase 81 45 - 117 U/L    Protein, total 6.1 (L) 6.4 - 8.2 g/dL    Albumin 2.7 (L) 3.5 - 5.0 g/dL    Globulin 3.4 2.0 - 4.0 g/dL    A-G Ratio 0.8 (L) 1.1 - 2.2     CBC W/O DIFF    Collection Time: 11/12/18 12:41 AM   Result Value Ref Range    WBC 7.2 4.1 - 11.1 K/uL    RBC 3.90 (L) 4.10 - 5.70 M/uL    HGB 11.9 (L) 12.1 - 17.0 g/dL    HCT 36.3 (L) 36.6 - 50.3 %    MCV 93.1 80.0 - 99.0 FL    MCH 30.5 26.0 - 34.0 PG    MCHC 32.8 30.0 - 36.5 g/dL    RDW 14. 7 (H) 11.5 - 14.5 %    PLATELET 742 535 - 829 K/uL    MPV 10.7 8.9 - 12.9 FL    NRBC 0.0 0  WBC    ABSOLUTE NRBC 0.00 0.00 - 7.19 K/uL   METABOLIC PANEL, COMPREHENSIVE    Collection Time: 11/12/18 12:41 AM   Result Value Ref Range    Sodium 141 136 - 145 mmol/L    Potassium 3.6 3.5 - 5.1 mmol/L    Chloride 108 97 - 108 mmol/L    CO2 25 21 - 32 mmol/L    Anion gap 8 5 - 15 mmol/L    Glucose 106 (H) 65 - 100 mg/dL    BUN 10 6 - 20 MG/DL    Creatinine 1.02 0.70 - 1.30 MG/DL    BUN/Creatinine ratio 10 (L) 12 - 20      GFR est AA >60 >60 ml/min/1.73m2    GFR est non-AA >60 >60 ml/min/1.73m2    Calcium 8.6 8.5 - 10.1 MG/DL    Bilirubin, total 0.2 0.2 - 1.0 MG/DL    ALT (SGPT) 12 12 - 78 U/L    AST (SGOT) 7 (L) 15 - 37 U/L    Alk.  phosphatase 77 45 - 117 U/L    Protein, total 6.4 6.4 - 8.2 g/dL    Albumin 2.9 (L) 3.5 - 5.0 g/dL    Globulin 3.5 2.0 - 4.0 g/dL    A-G Ratio 0.8 (L) 1.1 - 2.2     METABOLIC PANEL, BASIC    Collection Time: 11/14/18  3:11 AM   Result Value Ref Range    Sodium 140 136 - 145 mmol/L    Potassium 4.1 3.5 - 5.1 mmol/L    Chloride 107 97 - 108 mmol/L    CO2 26 21 - 32 mmol/L    Anion gap 7 5 - 15 mmol/L    Glucose 124 (H) 65 - 100 mg/dL    BUN 14 6 - 20 MG/DL    Creatinine 0.98 0.70 - 1.30 MG/DL    BUN/Creatinine ratio 14 12 - 20      GFR est AA >60 >60 ml/min/1.73m2    GFR est non-AA >60 >60 ml/min/1.73m2    Calcium 9.2 8.5 - 10.1 MG/DL           Physical Exam on Discharge:    Discharge condition: stable    Vital signs:   Patient Vitals for the past 12 hrs:   Temp Pulse Resp BP SpO2   11/16/18 0802 98.1 °F (36.7 °C) 71 17 126/79 95 %   11/16/18 0244 98.1 °F (36.7 °C) 67 18 108/76 98 %       General appearance: alert, cooperative, no distress, appears stated age  Lungs: clear to auscultation bilaterally  Heart: regular rate and rhythm, S1, S2 normal, no murmur, click, rub or gallop    Current Discharge Medication List      START taking these medications    Details   nicotine (NICODERM CQ) 14 mg/24 hr patch 1 Patch by TransDERmal route daily for 30 days. Qty: 14 Patch, Refills: 0      oxyCODONE IR (ROXICODONE) 5 mg immediate release tablet Take 1.5 Tabs by mouth every eight (8) hours as needed. Max Daily Amount: 22.5 mg.  Qty: 10 Tab, Refills: 0    Associated Diagnoses: Retroperitoneal mass; Lymphadenopathy      naloxone (NARCAN) 4 mg/actuation nasal spray Use 1 spray intranasally, then discard. Repeat with new spray every 2 min as needed for opioid overdose symptoms, alternating nostrils. Qty: 2 Each, Refills: 0         CONTINUE these medications which have NOT CHANGED    Details   LORazepam (ATIVAN) 0.5 mg tablet Take 0.5 mg by mouth. acetaminophen/diphenhydramine (TYLENOL PM PO) Take  by mouth.      lisinopril (PRINIVIL, ZESTRIL) 10 mg tablet Take 1 Tab by mouth daily. Qty: 30 Tab, Refills: 0      atorvastatin (LIPITOR) 10 mg tablet Take 1 Tab by mouth daily. Qty: 30 Tab, Refills: 1      hydroCHLOROthiazide (HYDRODIURIL) 12.5 mg tablet Take 1 Tab by mouth daily. Qty: 30 Tab, Refills: 1      aspirin delayed-release (ASPIR-81) 81 mg tablet Take 1 Tab by mouth daily. Qty: 30 Tab, Refills: 1               Total time spent on discharge plannin minutes    Lilli Schwartz MD  18  10:22 AM        NOTIFY YOUR PHYSICIAN FOR ANY OF THE FOLLOWING:   Fever over 101 degrees for 24 hours. Chest pain, shortness of breath, fever, chills, nausea, vomiting, diarrhea, change in mentation, falling, weakness, bleeding. Severe pain or pain not relieved by medications. Or, any other signs or symptoms that you may have questions about.

## 2018-11-16 NOTE — PROGRESS NOTES
-Hematology / Oncology ([facility]) -  -Primary Oncologist-   -CC-    -S-  No events. No fever, chills chest pain or dyspnea    -O-      Patient Vitals for the past 24 hrs:   Temp Pulse Resp BP SpO2   11/16/18 0802 98.1 °F (36.7 °C) 71 17 126/79 95 %   11/16/18 0244 98.1 °F (36.7 °C) 67 18 108/76 98 %   11/15/18 2006 98.2 °F (36.8 °C) 66 18 108/73 96 %     No intake/output data recorded. Gen: nad  Chest: bilateral breath sounds present  Cardiac: rrr  Abd: s/nt    -Labs-    Recent Labs     11/14/18  0311      K 4.1   *   BUN 14   CREA 0.98   CA 9.2       -Imaging-     CT Abd  1. Interval development of a large retroperitoneal mass encircling the aorta  with invasion of the left renal hilum and left adrenal gland. Several adjacent  lymph nodes are seen extending into the peritoneum and underneath the  diaphragmatic natalie. This most likely represents lymphoma. 2. Several new bilateral enlarged inguinal lymph nodes also likely representing  lymphoma. 3. Moderate left hydronephrosis with a delayed renal nephrogram related to  decreased renal function. This is related to the invasion of the renal hilum.      -Assessment + Plan-     1.  follicular lymphoma grade 3   He will need outpatient PET,PORT, bone marrow biopsy    2.   Follow up   He can be discharged today   I will arrange f/u and additional studies with Dr Karey Viera

## 2018-11-16 NOTE — DISCHARGE INSTRUCTIONS
Discharge Instructions       PATIENT ID: Milton Mojica  MRN: [id number]   YOB: 1975    DATE OF ADMISSION: 11/10/2018  2:01 AM    DATE OF DISCHARGE: 11/16/2018    PRIMARY CARE PROVIDER: None     ATTENDING PHYSICIAN: Vanessa Regalado MD  DISCHARGING PROVIDER: Roman Koehler MD    To contact this individual call [phone] and ask the  to page. If unavailable ask to be transferred the Adult Hospitalist Department. DISCHARGE DIAGNOSES   Follicular Lymphoma, biopsy proven    CONSULTATIONS: IP CONSULT TO ONCOLOGY  IP CONSULT TO UROLOGY  IP CONSULT TO GENERAL SURGERY    PROCEDURES/SURGERIES: Procedure(s):  INCISIONAL RIGHT INGUINAL LYMPH NODE BIOPSY    PENDING TEST RESULTS:   At the time of discharge the following test results are still pending: n/a    FOLLOW UP APPOINTMENTS:   Follow-up Information     Follow up With Specialties Details Why Contact Info    None    None (395) Patient stated that they have no PCP      Negro Barahona MD Hematology and Oncology, Hematology, Oncology Schedule an appointment as soon as possible for a visit Dr. Lidia Laurent office will make arrangement for Follow up and further care [place] [address]  [phone]      Yamel Quispe MD Urology Schedule an appointment as soon as possible for a visit  [address] [phone]             ADDITIONAL CARE RECOMMENDATIONS:   · It is important that you take the medication exactly as they are prescribed. · Keep your medication in the bottles provided by the pharmacist and keep a list of the medication names, dosages, and times to be taken in your wallet. · Do not take other medications without consulting your doctor. · No drinking alcohol or driving car or operating machinery if you are on narcotic pain medications. Donot take sedating mediations if you are sleepy or confused.    · Fall Precautions  · Keep Well Hydrated  · Report to your medical provider if you feel you have  developed allergies to medications  · Follow up with your PCP or Consultant for medication adjustments and refills  · Monitor for signs of fevers,chills,bleeding,chest pain and seek medical attention if you do so. DIET: Regular Diet    ACTIVITY: Ambulate in house and no driving while on opiates    WOUND CARE: local wound care as per Gen surg    EQUIPMENT needed: n/a      DISCHARGE MEDICATIONS:   See Medication Reconciliation Form    · It is important that you take the medication exactly as they are prescribed. · Keep your medication in the bottles provided by the pharmacist and keep a list of the medication names, dosages, and times to be taken in your wallet. · Do not take other medications without consulting your doctor. NOTIFY YOUR PHYSICIAN FOR ANY OF THE FOLLOWING:   Fever over 101 degrees for 24 hours. Chest pain, shortness of breath, fever, chills, nausea, vomiting, diarrhea, change in mentation, falling, weakness, bleeding. Severe pain or pain not relieved by medications. Or, any other signs or symptoms that you may have questions about.       DISPOSITION:   x Home With:   OT  PT  [facility]  RN       SNF/Inpatient Rehab/LTAC    Independent/assisted living    Hospice    Other:           Signed:   Saman George MD  11/16/2018  10:21 AM

## 2018-11-16 NOTE — PROGRESS NOTES
CM met with patient today yo discuss plans for discharge. He is in agreement with follow-up for new PCP with [facility]. He has applied for disability and has received a financial assistance (Care Card) application. He lives with this father and will have no needs re transportation to his outpatient appointments.     ARUN Lopez, CRM

## 2018-11-16 NOTE — PROGRESS NOTES
Bedside shift change report given to [address] [facility] [address] (oncoming nurse) by Donya Huber RN (offgoing nurse). Report included the following information SBAR, Kardex, Intake/Output and MAR.

## 2018-11-17 NOTE — PROGRESS NOTES
Attempted to call patient to inform him of follow-up appointment with [facility] (Dr Catie Chavez) for Wednesday 11/21/18 at 10:30 am, phone number provided by patient is \"no longer a working number\", left message on father's phone number [phone] with appointment date, time, Dr Liao Bold name and office phone number. Spoke with  Nicholas Cruz (Last name) NP at [facility] and gave her the above information.

## 2018-11-21 ENCOUNTER — TELEPHONE (OUTPATIENT)
Dept: ONCOLOGY | Age: 43
End: 2018-11-21

## 2018-11-21 NOTE — TELEPHONE ENCOUNTER
ONCOLOGY NURSE NAVIGATOR    Appreciate referral from Yeimi Lyon RN, to follow up after Mr. Amanda Dexter discharge. Have ascertained from nursing staff at [address] [place] [address] with Dr. Charity Huerta that Mr. Amanda Dexter has been in contact with [facility] today to work on scheduling his outpatient PET, port placement and BM biopsy.     Leopold Kindle MS RN AOCNS

## 2018-12-12 NOTE — PROGRESS NOTES
[address] [facility]  [phone]    Hematology / Oncology Consult    Reason for Visit:   Amador Hubbard is a 41 y.o. male who is seen in consultation at the request of Dr. Joe Llanes for evaluation of lymphoma. Hematology Oncology Treatment History:     Diagnosis: Follicular lymphoma    Stage: IV    Pathology:   11/13/18 right inguinal LN excision: Follicular lymphoma, high-grade (grade 3a of 3). Comment   The delaney architecture is entirely effaced by atypical lymphocytic proliferation with prominent nodular growth pattern. The majority of the atypical lymphocytes are small to medium in size and have irregular nuclear outlines and inconspicuous nucleoli, morphologically consistent with centrocytes. Scattered large lymphocytes with prominent nucleoli, consistent with centroblasts are identified (> 15 per high-power field). Focal increase in mitotic figures is noted. Occasional follicular dendritic cells are seen. By immunohistochemistry, the atypical lymphocytes are positive for CD20, PAX5, CD10, BCL6 and BCL2 (weak, focal) and negative for MUM1. CD3, CD5 and CD43 stain numerous background T lymphocytes. Ki-67 reveals a high proliferation index in the neoplastic follicles (overall 64-26%). Clinical history indicates 14 cm retroperitoneal mass with bilateral inguinal lymphadenopathy. In summary, the combined morphologic and phenotypic findings are diagnostic of a high-grade follicular lymphoma (grade 3a of 3). There is no evidence of diffuse large B-cell lymphoma. Flow cytometry analysis:   Monoclonal B-cell population (47 % of all cells) with mild increase in side and forward scatter properties expressing CD19, CD20, CD23 and CD10 with surface lambda light chain restriction. No phenotypically aberrant T-cell population. Flow cytometry was performed at [facility]   Prior Treatment: None    Current Treatment: Obinutuzumab-CHOP.  Obinutuzumab: 1000 mg weekly on days 1, 8, 15 for cycle 1, then 1000 mg on day 1 q21 days for cycles 2-6, then monotherapy 1000 mg every 21 days for cycle 7, 8 with Cyclophosphamide 750mg/m2, Doxorubicin 50mg/m2, Vincristine 1.4mg/m2 on day 1 and Prednisone 100mg on Days 1-5, every 21 days for a total of 6 cycles.       History of Present Illness:   Mr. Zapata is a 89 [facility] y/o male with HTN who comes in for evaluation of lymphoma. He states he was in his usual state of health in early November 2018, but then     [address] [facility] y/o man w/ HTN who presented to an outside ER with low back pain x 3 days. The pain is constant, radiates to the buttocks, without exacerbating or alleviating factors, associated w/ increased urination, no n/v/d, no dysuria, no fever, he's unsure about weight loss. Work-up there revealed a retroperitoneal mass and he was transferred here for further work-up. The patient is a 51-year-old who presented to the emergency room with complaints of a 3-day history of low back pain. He had a CT scan of the abdomen and pelvis which showed a large retroperitoneal mass encircling the aorta with invasion of the left renal hilum and left adrenal gland. There were bilateral inguinal lymph nodes and moderate left hydronephrosis. I am asked to see him regarding his retroperitoneal mass. Urology has been consulted secondary to his hydronephrosis. He reports that prior to 3 days ago he was in his usual health with no complaints. On further questioning, he does note that he has had palpable nodes in his groin for approximately the past 1 month. He denies any testicular mass, anorexia, weight loss, fevers, night sweats, chest pain, shortness of breath, abdominal pain or nausea. He has been out of his meds for the past week. He was working full time, feeling well until early Nov 2018. When he developed the left lower back pain, he went to [facility] and [facility]. No fevers, chills, night sweats.  He has lost 15 lbs in the past month due to low appetite. No n/v/d. No current constipation. No CP, SOB. No h/o cardiac disease aside from HTN, Hyperlipiemia. No hematochezia/melena, hematuria. He has HTN and XOL, which he was taking meds for, but has run out. Has no PCP currently. He was lost to f/u. He was uninsured. He works as a cook. Currently working part time. He has children aged 12 and 13. FAMILY HISTORY:  His father has a history of leukemia, currently in remission, treated at [facility]. LYMPHATICS:  Bilateral palpable inguinal adenopathy. Past Medical History:   Diagnosis Date    Anxiety     Hyperlipidemia     Hypertension     Lymphadenopathy 11/12/2018      No past surgical history on file. Social History     Tobacco Use    Smoking status: Current Every Day Smoker     Packs/day: 2.00     Years: 20.00     Pack years: 40.00    Smokeless tobacco: Never Used   Substance Use Topics    Alcohol use: No     Frequency: Never      Family History   Problem Relation Age of Onset    Hypertension Father      Current Outpatient Medications   Medication Sig    nicotine (NICODERM CQ) 14 mg/24 hr patch 1 Patch by TransDERmal route daily for 30 days.  oxyCODONE IR (ROXICODONE) 5 mg immediate release tablet Take 1.5 Tabs by mouth every eight (8) hours as needed. Max Daily Amount: 22.5 mg.    naloxone (NARCAN) 4 mg/actuation nasal spray Use 1 spray intranasally, then discard. Repeat with new spray every 2 min as needed for opioid overdose symptoms, alternating nostrils.  LORazepam (ATIVAN) 0.5 mg tablet Take 0.5 mg by mouth.  acetaminophen/diphenhydramine (TYLENOL PM PO) Take  by mouth.  lisinopril (PRINIVIL, ZESTRIL) 10 mg tablet Take 1 Tab by mouth daily.  atorvastatin (LIPITOR) 10 mg tablet Take 1 Tab by mouth daily.  hydroCHLOROthiazide (HYDRODIURIL) 12.5 mg tablet Take 1 Tab by mouth daily.  aspirin delayed-release (ASPIR-81) 81 mg tablet Take 1 Tab by mouth daily.      No current facility-administered medications for this visit. No Known Allergies     Review of Systems: A complete review of systems was obtained, negative except as described above. Physical Exam:     Visit Vitals  BP (!) 168/111 (BP 1 Location: Left arm, BP Patient Position: Sitting)   Pulse 80   Temp 96 °F (35.6 °C) (Oral)   Ht 5' 7\" (1.702 m)   Wt 157 lb (71.2 kg)   SpO2 98%   BMI 24.59 kg/m²     ECOG PS: 0  General: Well developed, no acute distress  Eyes: PERRLA, EOMI, anicteric sclerae  HENT: Atraumatic, OP clear, TMs intact without erythema  Neck: Supple  Lymphatic: No cervical, supraclavicular, axillary. Bilateral small 2-3cm palpable inguinal adenopathy  Respiratory: CTAB, normal respiratory effort  CV: Normal rate, regular rhythm, no murmurs, no peripheral edema  GI: Soft, nontender, nondistended, no masses, no hepatomegaly, no splenomegaly  MS: Normal gait and station. Digits without clubbing or cyanosis. Skin: No rashes, ecchymoses, or petechiae. Normal temperature, turgor, and texture. Neuro/Psych: Alert, oriented. 5/5 strength in all 4 extremities. Appropriate affect, normal judgment/insight. Results:     Lab Results   Component Value Date/Time    WBC 7.2 11/12/2018 12:41 AM    HGB 11.9 (L) 11/12/2018 12:41 AM    HCT 36.3 (L) 11/12/2018 12:41 AM    PLATELET 511 18/45/2838 12:41 AM    MCV 93.1 11/12/2018 12:41 AM    ABS.  NEUTROPHILS 7.9 11/09/2018 09:49 PM    Hemoglobin (POC) 15.0 06/05/2009 02:13 PM    Hematocrit (POC) 44 06/05/2009 02:13 PM     Lab Results   Component Value Date/Time    Sodium 140 11/14/2018 03:11 AM    Potassium 4.1 11/14/2018 03:11 AM    Chloride 107 11/14/2018 03:11 AM    CO2 26 11/14/2018 03:11 AM    Glucose 124 (H) 11/14/2018 03:11 AM    BUN 14 11/14/2018 03:11 AM    Creatinine 0.98 11/14/2018 03:11 AM    GFR est AA >60 11/14/2018 03:11 AM    GFR est non-AA >60 11/14/2018 03:11 AM    Calcium 9.2 11/14/2018 03:11 AM    Sodium (POC) 139 06/05/2009 02:13 PM    Potassium (POC) 3.9 06/05/2009 02:13 PM    Chloride (POC) 103 2009 02:13 PM    Glucose (POC) 98 2009 02:13 PM    BUN (POC) 9 2009 02:13 PM    Creatinine (POC) 1.0 2009 02:13 PM    Calcium, ionized (POC) 1.21 2009 02:13 PM     Lab Results   Component Value Date/Time    Bilirubin, total 0.2 2018 12:41 AM    ALT (SGPT) 12 2018 12:41 AM    AST (SGOT) 7 (L) 2018 12:41 AM    Alk. phosphatase 77 2018 12:41 AM    Protein, total 6.4 2018 12:41 AM    Albumin 2.9 (L) 2018 12:41 AM    Globulin 3.5 2018 12:41 AM     No results found for: IRON, FE, TIBC, IBCT, PSAT, FERR    No results found for: B12LT, FOL, RBCF  Lab Results   Component Value Date/Time    TSH 1.53 2016 04:40 AM     18:     No results found for: HAMAT, HAAB, HABT, HAAT, HBSAG, HBSB, HBSAT, HBABN, HBCM, HBCAB, HBCAT, XBCABS, [address] [facility], [address] [place] [address], [facility], [id number], [address] [place] [address], [facility], [address] [place] [address], [id number], [id number], [address] [place] [address], [id number], [facility], [id number], HCGAT      Imagin/9/18 Abd/pelvis CT: IMPRESSION:  1. Interval development of a large retroperitoneal mass encircling the aorta with invasion of the left renal hilum and left adrenal gland. Several adjacent lymph nodes are seen extending into the peritoneum and underneath the  diaphragmatic natalie. This most likely represents lymphoma. 2. Several new bilateral enlarged inguinal lymph nodes also likely representing lymphoma. 3. Moderate left hydronephrosis with a delayed renal nephrogram related to decreased renal function. This is related to the invasion of the renal hilum. 18 Chest CT: IMPRESSION:  Trace left pleural effusion. Bilateral lower lobe atelectasis. Large  retroperitoneal mass lesion again demonstrated. Assessment & Plan:   Ann Hutton is a 41 y.o. male comes in for evaluation and management of lymphoma. 1. Follicular lymphoma: Grade 3a.  Although grade 3a disease is considered more indolent and can be treated like grade 1/2 disease, this patient has indications for treatment: bulky disease encircling the aorta causing symptoms. BR better than RCHOP, but based on GALLIUM study, Obinutuzumab-based induction and maintenance prolongs PFS over that seen with rituximab-based therapy. Therefore, I have chosen to treat patient with O-CHOP regimen for 6 cycles, followed by possible maintenance Obinutuzumab. We discussed the risks and benefits of O-CHOP chemotherapy. The potential side effects include, but are not limited to: nausea, vomiting, diarrhea, constipation, Flu-like symptoms, infusion reaction, allergic reaction, flushing, taste changes, increased risk of infection, anemia, fatigue, alopecia, neuropathy, nail changes, cardiac damage, mucositis, myleosuppression, infertility and rarely, death. Patient completed chemoteaching and received a chemotherapy education folder. -- Needs PET, bone marrow biopsy, and port ASAP  -- Echo, referral to Cardio-Oncology  -- Check Hep B/C profiles, HIV, uric acid, B2M.  -- Patient will sign chemotherapy consent form at next visit  -- Will tentatively plan to start O-CHOP regimen on 12/27/18 with labs, MD, chemo    2. Use of cardiotoxic chemotherapy: Discussed risks/benefits of using doxorubicin. Will obtain echo and refer to Cardiology. -- Echo and refer to Cardiology    3. Neoplasm related pain: Left lower back pain. Was taking Oxycodone 5-10mg, but has run out.  -- Oxycodone 5mg every 6hrs prn for pain, 60 tabs given. -- Needs to sign pain contract at next visit    4. HTN / Hyperlipidemia: Uncontrolled as pt has been out of his meds for 1 weeks. -- Refilled Lisinopril, HCTZ, Lipitor today. 5. Tobacco abuse: Counseled on risk of smoking, benefits of quitting. Discussed cessation strategies. Emotional well being: Pt is coping well with his/her disease and has excellent support. Met with SW.    I appreciate the opportunity to participate in Mr. Madeline cabezas.     Signed By: Willi Mccall MD     December 12, 2018

## 2018-12-13 ENCOUNTER — DOCUMENTATION ONLY (OUTPATIENT)
Dept: ONCOLOGY | Age: 43
End: 2018-12-13

## 2018-12-13 ENCOUNTER — OFFICE VISIT (OUTPATIENT)
Dept: ONCOLOGY | Age: 43
End: 2018-12-13

## 2018-12-13 VITALS
DIASTOLIC BLOOD PRESSURE: 111 MMHG | WEIGHT: 157 LBS | SYSTOLIC BLOOD PRESSURE: 168 MMHG | OXYGEN SATURATION: 98 % | HEART RATE: 80 BPM | TEMPERATURE: 96 F | BODY MASS INDEX: 24.64 KG/M2 | HEIGHT: 67 IN

## 2018-12-13 DIAGNOSIS — E78.5 HYPERLIPIDEMIA, UNSPECIFIED HYPERLIPIDEMIA TYPE: ICD-10-CM

## 2018-12-13 DIAGNOSIS — G89.3 ACUTE NEOPLASM-RELATED PAIN: ICD-10-CM

## 2018-12-13 DIAGNOSIS — I10 HYPERTENSION, UNSPECIFIED TYPE: ICD-10-CM

## 2018-12-13 DIAGNOSIS — Z51.81 ENCOUNTER FOR MONITORING CARDIOTOXIC DRUG THERAPY: ICD-10-CM

## 2018-12-13 DIAGNOSIS — Z79.899 ENCOUNTER FOR MONITORING CARDIOTOXIC DRUG THERAPY: ICD-10-CM

## 2018-12-13 DIAGNOSIS — C82.33 FOLLICULAR LYMPHOMA GRADE IIIA OF INTRA-ABDOMINAL LYMPH NODES (HCC): Primary | ICD-10-CM

## 2018-12-13 RX ORDER — ONDANSETRON HYDROCHLORIDE 8 MG/1
8 TABLET, FILM COATED ORAL
Qty: 30 TAB | Refills: 2 | Status: SHIPPED | OUTPATIENT
Start: 2018-12-13 | End: 2019-09-11

## 2018-12-13 RX ORDER — ATORVASTATIN CALCIUM 10 MG/1
10 TABLET, FILM COATED ORAL DAILY
Qty: 30 TAB | Refills: 2 | Status: SHIPPED | OUTPATIENT
Start: 2018-12-13 | End: 2019-01-08

## 2018-12-13 RX ORDER — AMOXICILLIN 250 MG
1 CAPSULE ORAL DAILY
Qty: 60 TAB | Refills: 3 | Status: SHIPPED | OUTPATIENT
Start: 2018-12-13 | End: 2019-09-11

## 2018-12-13 RX ORDER — HYDROCHLOROTHIAZIDE 12.5 MG/1
12.5 TABLET ORAL DAILY
Qty: 30 TAB | Refills: 2 | Status: ON HOLD | OUTPATIENT
Start: 2018-12-13 | End: 2019-01-12 | Stop reason: SDUPTHER

## 2018-12-13 RX ORDER — PREDNISONE 20 MG/1
100 TABLET ORAL
Qty: 25 TAB | Refills: 5 | Status: SHIPPED | OUTPATIENT
Start: 2018-12-13 | End: 2019-03-21 | Stop reason: ALTCHOICE

## 2018-12-13 RX ORDER — OXYCODONE HYDROCHLORIDE 5 MG/1
5 CAPSULE ORAL
Qty: 60 CAP | Refills: 0 | Status: SHIPPED | OUTPATIENT
Start: 2018-12-13 | End: 2019-01-09 | Stop reason: SDUPTHER

## 2018-12-13 RX ORDER — LISINOPRIL 10 MG/1
10 TABLET ORAL DAILY
Qty: 30 TAB | Refills: 2 | Status: ON HOLD | OUTPATIENT
Start: 2018-12-13 | End: 2019-01-12 | Stop reason: SDUPTHER

## 2018-12-13 RX ORDER — PROCHLORPERAZINE MALEATE 10 MG
10 TABLET ORAL
Qty: 30 TAB | Refills: 2 | Status: SHIPPED | OUTPATIENT
Start: 2018-12-13 | End: 2018-12-20

## 2018-12-13 NOTE — PROGRESS NOTES
Oncology Navigator  Psychosocial Assessment    Reason for Assessment:    []Depression  []Anxiety  []Caregiver [place]  []Maladaptive Coping with Serious Illness   [x]Other: new dx:     Sources of Information:    [x]Patient  [x]Family  [x]Staff  [x]Medical Record    Advance Care Planning:  Advance Care Planning 11/10/2018   Confirm Advance Directive None   Patient Would Like to Complete Advance Directive No       Mental Status:    [x]Alert  []Lethargic  []Unresponsive  Oriented to:  [x]Person  [x]Place  [x]Time  [x]Situation      Barriers to Learning:    []Language  []Developmental  []Cognitive  []Altered Mental Status  []Visual/Hearing Impairment  []Unable to Read/Write  []Motivational   [x]No Barriers Identified  []Other:     Relationship Status:  []Single  []  []Significant Other/Life Partner  [x]  []  []      Living Circumstances:  []Lives Alone  [x]Family/Significant Other in Household  []Roommates  []Children in the Home  []Paid Caregivers  []Assisted Living Facility/Group Home  []Skilled [address]  []Homeless  []Incarcerated  []Environmental/Care Concerns  []Other:    Support System:    [x]Strong  []Fair  []Limited    Financial/Legal Concerns:    [x]Uninsured  [x]Limited Income/Resources  []Non-Citizen  []No Concerns Identified  []Financial POA:    []Other:    Zoroastrianism/Spiritual/Existential:  []Strong Sense of Spirituality  []Involved in [employer]  []Request  Visit  []Expressing Spiritual/Existential Angst  [x]No Concerns Identified    Coping with Illness:         Patient: Family/Caregiver:   Understanding and Acceptance of Illness/Prognosis  [x] [x]   Strong Sense of Resilience [] []   Self Reflection [] []   Engaged Support System [] []   Does not Readily Discuss Illness [x] []   Denial of Terminal Status [] []   Anger [] []   Depression [] []   Anxiety/Fear [x] []   Bargaining [] []   Recent Diagnosis/Prognosis [x] []   Difficulties with Body Image [] [] Loss of Identity [] []   Excessive Substance Use [] []   Mental Health History [] []   Enmeshed Relationships [] []   History of Loss [] []   Anticipatory Grief [] []   Concern for Complicated Grief [] []   Suicidal Ideation or Plan [] []   Unable to assess [] []                  Narrative: Met with patient, his father and his aunt to introduce social work navigator role and supports. Patient presents as pleasant and engaged. Patient lives with is father in a private residence. Patient works part time as a cook. Reviewed barriers to care and patient is uninsured. He tells me he completed [facility] financial screening and Medicaid application at [facility]. Assisted patient complete the Care Card application. He will bring in financials to submit with application. Provided him with educational information about supportive services offered at the [facility]. Answered all questions about Social Security Disability. Patient's family is able to provide transportation to appointments. Patient endorsed feeling overwhelmed regarding diagnosis and treatment. Acknowledged patient's feelings and encouraged him to contact me as needed. Referrals:     I. Transportation    Medicaid ([employer]) []   [facility] []                                    Regional organization  []                                      Financial Assistance/Medication Access    Patient assistance program (Care Card) [x]   Co-pay assistance  []                                    Leukemia & Lymphoma Society []   [address]  []   [facility] [address] []   Can[facility]Care  []     Emotional support    Peer support group []   Local counseling []                                    Online support group []   Coordination of psychiatry consult []     Goals/Plan:   1. Apply for Care Card  2. Referral to [facility] Financial counselor to assist with drug assistance  3.  Meet with patient when he RTC for ongoing psychosocial support    -Corry Carcamo MSW

## 2018-12-14 ENCOUNTER — TELEPHONE (OUTPATIENT)
Dept: ONCOLOGY | Age: 43
End: 2018-12-14

## 2018-12-14 DIAGNOSIS — Z51.81 ENCOUNTER FOR MONITORING CARDIOTOXIC DRUG THERAPY: ICD-10-CM

## 2018-12-14 DIAGNOSIS — Z79.899 ENCOUNTER FOR MONITORING CARDIOTOXIC DRUG THERAPY: ICD-10-CM

## 2018-12-14 DIAGNOSIS — C82.38 GRADE 3A FOLLICULAR LYMPHOMA OF LYMPH NODES OF MULTIPLE REGIONS (HCC): ICD-10-CM

## 2018-12-14 DIAGNOSIS — C82.68 CUTANEOUS FOLLICLE CENTER LYMPHOMA INVOLVING LYMPH NODES OF MULTIPLE REGIONS (HCC): Primary | ICD-10-CM

## 2018-12-14 NOTE — TELEPHONE ENCOUNTER
Pt is scheduled with Dr. Rowe Boxer at [facility] on 12/27/18 at 9:00 am but to arrive at 26am at [address] [address] [address] [id number] and to call [phone] to reschedule if needed. Called pt and phone is out of service. Called pts Aunt Sherrill on PHI and left a detailed voicemail with the above information requesting a return call to verify the info was received and with any questions.

## 2018-12-17 ENCOUNTER — CLINICAL SUPPORT (OUTPATIENT)
Dept: CARDIOLOGY CLINIC | Age: 43
End: 2018-12-17

## 2018-12-17 DIAGNOSIS — Z51.81 ENCOUNTER FOR MONITORING CARDIOTOXIC DRUG THERAPY: ICD-10-CM

## 2018-12-17 DIAGNOSIS — Z79.899 ENCOUNTER FOR MONITORING CARDIOTOXIC DRUG THERAPY: ICD-10-CM

## 2018-12-17 DIAGNOSIS — C82.38 GRADE 3A FOLLICULAR LYMPHOMA OF LYMPH NODES OF MULTIPLE REGIONS (HCC): ICD-10-CM

## 2018-12-17 DIAGNOSIS — C85.90 LYMPHOMA, UNSPECIFIED BODY REGION, UNSPECIFIED LYMPHOMA TYPE (HCC): Primary | ICD-10-CM

## 2018-12-18 ENCOUNTER — HOSPITAL ENCOUNTER (OUTPATIENT)
Dept: PET IMAGING | Age: 43
Discharge: HOME | End: 2018-12-18
Attending: INTERNAL MEDICINE
Payer: SUBSIDIZED

## 2018-12-18 VITALS — HEIGHT: 67 IN | BODY MASS INDEX: 24.33 KG/M2 | WEIGHT: 155 LBS

## 2018-12-18 DIAGNOSIS — C82.33 FOLLICULAR LYMPHOMA GRADE IIIA OF INTRA-ABDOMINAL LYMPH NODES (HCC): ICD-10-CM

## 2018-12-18 PROCEDURE — 78815 PET IMAGE W/CT SKULL-THIGH: CPT

## 2018-12-18 RX ORDER — DIPHENHYDRAMINE HYDROCHLORIDE 50 MG/ML
50 INJECTION, SOLUTION INTRAMUSCULAR; INTRAVENOUS AS NEEDED
Status: CANCELLED
Start: 2018-12-27

## 2018-12-18 RX ORDER — SODIUM CHLORIDE 9 MG/ML
10 INJECTION INTRAMUSCULAR; INTRAVENOUS; SUBCUTANEOUS AS NEEDED
Status: CANCELLED | OUTPATIENT
Start: 2018-12-28

## 2018-12-18 RX ORDER — SODIUM CHLORIDE 9 MG/ML
25 INJECTION, SOLUTION INTRAVENOUS CONTINUOUS
Status: CANCELLED | OUTPATIENT
Start: 2019-01-03

## 2018-12-18 RX ORDER — SODIUM CHLORIDE 9 MG/ML
25 INJECTION, SOLUTION INTRAVENOUS CONTINUOUS
Status: CANCELLED | OUTPATIENT
Start: 2018-12-27

## 2018-12-18 RX ORDER — EPINEPHRINE 1 MG/ML
0.3 INJECTION, SOLUTION, CONCENTRATE INTRAVENOUS AS NEEDED
Status: CANCELLED | OUTPATIENT
Start: 2019-01-10

## 2018-12-18 RX ORDER — ACETAMINOPHEN 325 MG/1
650 TABLET ORAL ONCE
Status: CANCELLED
Start: 2018-12-27 | End: 2018-12-27

## 2018-12-18 RX ORDER — ONDANSETRON 2 MG/ML
8 INJECTION INTRAMUSCULAR; INTRAVENOUS AS NEEDED
Status: CANCELLED | OUTPATIENT
Start: 2018-12-27

## 2018-12-18 RX ORDER — SODIUM CHLORIDE 9 MG/ML
100 INJECTION, SOLUTION INTRAVENOUS CONTINUOUS
Status: CANCELLED | OUTPATIENT
Start: 2019-01-03

## 2018-12-18 RX ORDER — PREDNISONE 1 MG/1
100 TABLET ORAL
Status: CANCELLED
Start: 2018-12-27 | End: 2018-12-27

## 2018-12-18 RX ORDER — HYDROCORTISONE SODIUM SUCCINATE 100 MG/2ML
100 INJECTION, POWDER, FOR SOLUTION INTRAMUSCULAR; INTRAVENOUS AS NEEDED
Status: CANCELLED | OUTPATIENT
Start: 2019-01-10

## 2018-12-18 RX ORDER — DIPHENHYDRAMINE HYDROCHLORIDE 50 MG/ML
50 INJECTION, SOLUTION INTRAMUSCULAR; INTRAVENOUS AS NEEDED
Status: CANCELLED
Start: 2019-01-10

## 2018-12-18 RX ORDER — ONDANSETRON 2 MG/ML
8 INJECTION INTRAMUSCULAR; INTRAVENOUS AS NEEDED
Status: CANCELLED | OUTPATIENT
Start: 2019-01-03

## 2018-12-18 RX ORDER — ALBUTEROL SULFATE 0.83 MG/ML
2.5 SOLUTION RESPIRATORY (INHALATION) AS NEEDED
Status: CANCELLED
Start: 2019-01-03

## 2018-12-18 RX ORDER — EPINEPHRINE 1 MG/ML
0.3 INJECTION, SOLUTION, CONCENTRATE INTRAVENOUS AS NEEDED
Status: CANCELLED | OUTPATIENT
Start: 2019-01-03

## 2018-12-18 RX ORDER — ACETAMINOPHEN 325 MG/1
650 TABLET ORAL AS NEEDED
Status: CANCELLED
Start: 2019-01-10

## 2018-12-18 RX ORDER — EPINEPHRINE 1 MG/ML
0.3 INJECTION, SOLUTION, CONCENTRATE INTRAVENOUS AS NEEDED
Status: CANCELLED | OUTPATIENT
Start: 2018-12-28

## 2018-12-18 RX ORDER — HYDROCORTISONE SODIUM SUCCINATE 100 MG/2ML
100 INJECTION, POWDER, FOR SOLUTION INTRAMUSCULAR; INTRAVENOUS AS NEEDED
Status: CANCELLED | OUTPATIENT
Start: 2018-12-27

## 2018-12-18 RX ORDER — DIPHENHYDRAMINE HYDROCHLORIDE 50 MG/ML
50 INJECTION, SOLUTION INTRAMUSCULAR; INTRAVENOUS AS NEEDED
Status: CANCELLED
Start: 2019-01-03

## 2018-12-18 RX ORDER — DIPHENHYDRAMINE HYDROCHLORIDE 50 MG/ML
50 INJECTION, SOLUTION INTRAMUSCULAR; INTRAVENOUS AS NEEDED
Status: CANCELLED
Start: 2018-12-28

## 2018-12-18 RX ORDER — SODIUM CHLORIDE 0.9 % (FLUSH) 0.9 %
10 SYRINGE (ML) INJECTION AS NEEDED
Status: CANCELLED
Start: 2019-01-10

## 2018-12-18 RX ORDER — DIPHENHYDRAMINE HYDROCHLORIDE 50 MG/ML
50 INJECTION, SOLUTION INTRAMUSCULAR; INTRAVENOUS ONCE
Status: CANCELLED
Start: 2018-12-27 | End: 2018-12-27

## 2018-12-18 RX ORDER — ALBUTEROL SULFATE 0.83 MG/ML
2.5 SOLUTION RESPIRATORY (INHALATION) AS NEEDED
Status: CANCELLED
Start: 2018-12-27

## 2018-12-18 RX ORDER — ACETAMINOPHEN 325 MG/1
650 TABLET ORAL ONCE
Status: CANCELLED
Start: 2019-01-03 | End: 2019-01-03

## 2018-12-18 RX ORDER — ACETAMINOPHEN 325 MG/1
650 TABLET ORAL AS NEEDED
Status: CANCELLED
Start: 2018-12-27

## 2018-12-18 RX ORDER — SODIUM CHLORIDE 9 MG/ML
25 INJECTION, SOLUTION INTRAVENOUS CONTINUOUS
Status: CANCELLED | OUTPATIENT
Start: 2019-01-10

## 2018-12-18 RX ORDER — SODIUM CHLORIDE 0.9 % (FLUSH) 0.9 %
10 SYRINGE (ML) INJECTION AS NEEDED
Status: CANCELLED
Start: 2018-12-27

## 2018-12-18 RX ORDER — ACETAMINOPHEN 325 MG/1
650 TABLET ORAL ONCE
Status: CANCELLED
Start: 2019-01-10 | End: 2019-01-10

## 2018-12-18 RX ORDER — HYDROCORTISONE SODIUM SUCCINATE 100 MG/2ML
100 INJECTION, POWDER, FOR SOLUTION INTRAMUSCULAR; INTRAVENOUS AS NEEDED
Status: CANCELLED | OUTPATIENT
Start: 2019-01-03

## 2018-12-18 RX ORDER — SODIUM CHLORIDE 9 MG/ML
100 INJECTION, SOLUTION INTRAVENOUS CONTINUOUS
Status: CANCELLED | OUTPATIENT
Start: 2018-12-27

## 2018-12-18 RX ORDER — SODIUM CHLORIDE 9 MG/ML
10 INJECTION INTRAMUSCULAR; INTRAVENOUS; SUBCUTANEOUS AS NEEDED
Status: CANCELLED | OUTPATIENT
Start: 2019-01-03

## 2018-12-18 RX ORDER — ONDANSETRON 2 MG/ML
8 INJECTION INTRAMUSCULAR; INTRAVENOUS AS NEEDED
Status: CANCELLED | OUTPATIENT
Start: 2019-01-10

## 2018-12-18 RX ORDER — SODIUM CHLORIDE 0.9 % (FLUSH) 0.9 %
10 SYRINGE (ML) INJECTION
Status: COMPLETED | OUTPATIENT
Start: 2018-12-18 | End: 2018-12-18

## 2018-12-18 RX ORDER — SODIUM CHLORIDE 0.9 % (FLUSH) 0.9 %
10 SYRINGE (ML) INJECTION AS NEEDED
Status: CANCELLED
Start: 2018-12-28

## 2018-12-18 RX ORDER — ALBUTEROL SULFATE 0.83 MG/ML
2.5 SOLUTION RESPIRATORY (INHALATION) AS NEEDED
Status: CANCELLED
Start: 2019-01-10

## 2018-12-18 RX ORDER — SODIUM CHLORIDE 9 MG/ML
100 INJECTION, SOLUTION INTRAVENOUS CONTINUOUS
Status: CANCELLED | OUTPATIENT
Start: 2019-01-10

## 2018-12-18 RX ORDER — HYDROCORTISONE SODIUM SUCCINATE 100 MG/2ML
100 INJECTION, POWDER, FOR SOLUTION INTRAMUSCULAR; INTRAVENOUS AS NEEDED
Status: CANCELLED | OUTPATIENT
Start: 2018-12-28

## 2018-12-18 RX ORDER — HEPARIN 100 UNIT/ML
300-500 SYRINGE INTRAVENOUS AS NEEDED
Status: CANCELLED
Start: 2019-01-10

## 2018-12-18 RX ORDER — DOXORUBICIN HYDROCHLORIDE 2 MG/ML
25 INJECTION, SOLUTION INTRAVENOUS ONCE
Status: CANCELLED
Start: 2018-12-28 | End: 2018-12-28

## 2018-12-18 RX ORDER — SODIUM CHLORIDE 9 MG/ML
10 INJECTION INTRAMUSCULAR; INTRAVENOUS; SUBCUTANEOUS AS NEEDED
Status: CANCELLED | OUTPATIENT
Start: 2018-12-27

## 2018-12-18 RX ORDER — HEPARIN 100 UNIT/ML
300-500 SYRINGE INTRAVENOUS AS NEEDED
Status: CANCELLED
Start: 2019-01-03

## 2018-12-18 RX ORDER — ALBUTEROL SULFATE 0.83 MG/ML
2.5 SOLUTION RESPIRATORY (INHALATION) AS NEEDED
Status: CANCELLED
Start: 2018-12-28

## 2018-12-18 RX ORDER — SODIUM CHLORIDE 9 MG/ML
10 INJECTION INTRAMUSCULAR; INTRAVENOUS; SUBCUTANEOUS AS NEEDED
Status: CANCELLED | OUTPATIENT
Start: 2019-01-10

## 2018-12-18 RX ORDER — ACETAMINOPHEN 325 MG/1
650 TABLET ORAL AS NEEDED
Status: CANCELLED
Start: 2018-12-28

## 2018-12-18 RX ORDER — ACETAMINOPHEN 325 MG/1
650 TABLET ORAL AS NEEDED
Status: CANCELLED
Start: 2019-01-03

## 2018-12-18 RX ORDER — DIPHENHYDRAMINE HYDROCHLORIDE 50 MG/ML
50 INJECTION, SOLUTION INTRAMUSCULAR; INTRAVENOUS ONCE
Status: CANCELLED
Start: 2019-01-03 | End: 2019-01-03

## 2018-12-18 RX ORDER — PALONOSETRON 0.05 MG/ML
0.25 INJECTION, SOLUTION INTRAVENOUS ONCE
Status: CANCELLED
Start: 2018-12-28 | End: 2018-12-28

## 2018-12-18 RX ORDER — DIPHENHYDRAMINE HYDROCHLORIDE 50 MG/ML
50 INJECTION, SOLUTION INTRAMUSCULAR; INTRAVENOUS ONCE
Status: CANCELLED
Start: 2019-01-10 | End: 2019-01-10

## 2018-12-18 RX ORDER — SODIUM CHLORIDE 0.9 % (FLUSH) 0.9 %
10 SYRINGE (ML) INJECTION AS NEEDED
Status: CANCELLED
Start: 2019-01-03

## 2018-12-18 RX ORDER — HEPARIN 100 UNIT/ML
300-500 SYRINGE INTRAVENOUS AS NEEDED
Status: CANCELLED
Start: 2018-12-28

## 2018-12-18 RX ORDER — PREDNISONE 1 MG/1
100 TABLET ORAL
Status: CANCELLED
Start: 2018-12-28 | End: 2018-12-28

## 2018-12-18 RX ORDER — EPINEPHRINE 1 MG/ML
0.3 INJECTION, SOLUTION, CONCENTRATE INTRAVENOUS AS NEEDED
Status: CANCELLED | OUTPATIENT
Start: 2018-12-27

## 2018-12-18 RX ORDER — HEPARIN 100 UNIT/ML
300-500 SYRINGE INTRAVENOUS AS NEEDED
Status: CANCELLED
Start: 2018-12-27

## 2018-12-18 RX ORDER — ONDANSETRON 2 MG/ML
8 INJECTION INTRAMUSCULAR; INTRAVENOUS AS NEEDED
Status: CANCELLED | OUTPATIENT
Start: 2018-12-28

## 2018-12-18 RX ADMIN — Medication 10 ML: at 06:47

## 2018-12-20 ENCOUNTER — HOSPITAL ENCOUNTER (OUTPATIENT)
Dept: CT IMAGING | Age: 43
Discharge: HOME | End: 2018-12-20
Attending: INTERNAL MEDICINE | Admitting: INTERNAL MEDICINE
Payer: SUBSIDIZED

## 2018-12-20 ENCOUNTER — HOSPITAL ENCOUNTER (OUTPATIENT)
Dept: INTERVENTIONAL RADIOLOGY/VASCULAR | Age: 43
Discharge: HOME | End: 2018-12-20
Attending: INTERNAL MEDICINE | Admitting: INTERNAL MEDICINE
Payer: SUBSIDIZED

## 2018-12-20 VITALS
HEART RATE: 78 BPM | WEIGHT: 147.71 LBS | TEMPERATURE: 98.4 F | RESPIRATION RATE: 17 BRPM | DIASTOLIC BLOOD PRESSURE: 65 MMHG | OXYGEN SATURATION: 96 % | SYSTOLIC BLOOD PRESSURE: 96 MMHG | BODY MASS INDEX: 23.18 KG/M2 | HEIGHT: 67 IN

## 2018-12-20 VITALS
RESPIRATION RATE: 24 BRPM | SYSTOLIC BLOOD PRESSURE: 138 MMHG | DIASTOLIC BLOOD PRESSURE: 86 MMHG | OXYGEN SATURATION: 100 % | HEART RATE: 79 BPM

## 2018-12-20 DIAGNOSIS — C82.33 FOLLICULAR LYMPHOMA GRADE IIIA OF INTRA-ABDOMINAL LYMPH NODES (HCC): ICD-10-CM

## 2018-12-20 LAB
BASOPHILS # BLD: 0.1 K/UL (ref 0–0.1)
BASOPHILS NFR BLD: 1 % (ref 0–1)
DIFFERENTIAL METHOD BLD: ABNORMAL
EOSINOPHIL # BLD: 0.2 K/UL (ref 0–0.4)
EOSINOPHIL NFR BLD: 3 % (ref 0–7)
ERYTHROCYTE [DISTWIDTH] IN BLOOD BY AUTOMATED COUNT: 14.2 % (ref 11.5–14.5)
HCT VFR BLD AUTO: 39.4 % (ref 36.6–50.3)
HGB BLD-MCNC: 13.4 G/DL (ref 12.1–17)
IMM GRANULOCYTES # BLD: 0 K/UL (ref 0–0.04)
IMM GRANULOCYTES NFR BLD AUTO: 1 % (ref 0–0.5)
LYMPHOCYTES # BLD: 1.4 K/UL (ref 0.8–3.5)
LYMPHOCYTES NFR BLD: 21 % (ref 12–49)
MCH RBC QN AUTO: 30.6 PG (ref 26–34)
MCHC RBC AUTO-ENTMCNC: 34 G/DL (ref 30–36.5)
MCV RBC AUTO: 90 FL (ref 80–99)
MONOCYTES # BLD: 0.6 K/UL (ref 0–1)
MONOCYTES NFR BLD: 9 % (ref 5–13)
NEUTS SEG # BLD: 4.3 K/UL (ref 1.8–8)
NEUTS SEG NFR BLD: 66 % (ref 32–75)
NRBC # BLD: 0 K/UL (ref 0–0.01)
NRBC BLD-RTO: 0 PER 100 WBC
PLATELET # BLD AUTO: 203 K/UL (ref 150–400)
PMV BLD AUTO: 11 FL (ref 8.9–12.9)
RBC # BLD AUTO: 4.38 M/UL (ref 4.1–5.7)
WBC # BLD AUTO: 6.5 K/UL (ref 4.1–11.1)

## 2018-12-20 PROCEDURE — 77012 CT SCAN FOR NEEDLE BIOPSY: CPT

## 2018-12-20 PROCEDURE — C1788 PORT, INDWELLING, IMP: HCPCS

## 2018-12-20 PROCEDURE — 74011000250 HC RX REV CODE- 250: Performed by: RADIOLOGY

## 2018-12-20 PROCEDURE — 77030012935 HC DRSG AQUACEL BMS -B

## 2018-12-20 PROCEDURE — 77030028872 HC BN BIOP NDL ON CNTRL TY TELE -C

## 2018-12-20 PROCEDURE — C1894 INTRO/SHEATH, NON-LASER: HCPCS

## 2018-12-20 PROCEDURE — 77030014115

## 2018-12-20 PROCEDURE — 74011250636 HC RX REV CODE- 250/636

## 2018-12-20 PROCEDURE — 77030031139 HC SUT VCRL2 J&J -A

## 2018-12-20 PROCEDURE — 77030039266 HC ADH SKN EXOFIN S2SG -A

## 2018-12-20 PROCEDURE — 74011250636 HC RX REV CODE- 250/636: Performed by: RADIOLOGY

## 2018-12-20 PROCEDURE — 76937 US GUIDE VASCULAR ACCESS: CPT

## 2018-12-20 PROCEDURE — 85025 COMPLETE CBC W/AUTO DIFF WBC: CPT

## 2018-12-20 PROCEDURE — 99152 MOD SED SAME PHYS/QHP 5/>YRS: CPT

## 2018-12-20 RX ORDER — FENTANYL CITRATE 50 UG/ML
25-200 INJECTION, SOLUTION INTRAMUSCULAR; INTRAVENOUS
Status: DISCONTINUED | OUTPATIENT
Start: 2018-12-20 | End: 2018-12-20 | Stop reason: HOSPADM

## 2018-12-20 RX ORDER — CEFAZOLIN SODIUM/WATER 2 G/20 ML
2 SYRINGE (ML) INTRAVENOUS ONCE
Status: COMPLETED | OUTPATIENT
Start: 2018-12-20 | End: 2018-12-20

## 2018-12-20 RX ORDER — MIDAZOLAM HYDROCHLORIDE 1 MG/ML
5 INJECTION, SOLUTION INTRAMUSCULAR; INTRAVENOUS
Status: DISCONTINUED | OUTPATIENT
Start: 2018-12-20 | End: 2018-12-20

## 2018-12-20 RX ORDER — LIDOCAINE HYDROCHLORIDE AND EPINEPHRINE 10; 10 MG/ML; UG/ML
1.5 INJECTION, SOLUTION INFILTRATION; PERINEURAL
Status: DISCONTINUED | OUTPATIENT
Start: 2018-12-20 | End: 2018-12-20 | Stop reason: HOSPADM

## 2018-12-20 RX ORDER — LIDOCAINE HYDROCHLORIDE 10 MG/ML
10 INJECTION, SOLUTION EPIDURAL; INFILTRATION; INTRACAUDAL; PERINEURAL
OUTPATIENT
Start: 2018-12-20 | End: 2018-12-20

## 2018-12-20 RX ORDER — LIDOCAINE HYDROCHLORIDE 10 MG/ML
20 INJECTION INFILTRATION; PERINEURAL
Status: DISCONTINUED | OUTPATIENT
Start: 2018-12-20 | End: 2018-12-20 | Stop reason: HOSPADM

## 2018-12-20 RX ORDER — MIDAZOLAM HYDROCHLORIDE 1 MG/ML
5 INJECTION, SOLUTION INTRAMUSCULAR; INTRAVENOUS
Status: CANCELLED | OUTPATIENT
Start: 2018-12-20

## 2018-12-20 RX ORDER — LIDOCAINE HYDROCHLORIDE 10 MG/ML
5 INJECTION, SOLUTION EPIDURAL; INFILTRATION; INTRACAUDAL; PERINEURAL ONCE
Status: DISCONTINUED | OUTPATIENT
Start: 2018-12-20 | End: 2018-12-20 | Stop reason: HOSPADM

## 2018-12-20 RX ORDER — FENTANYL CITRATE 50 UG/ML
100 INJECTION, SOLUTION INTRAMUSCULAR; INTRAVENOUS
Status: CANCELLED | OUTPATIENT
Start: 2018-12-20

## 2018-12-20 RX ORDER — LIDOCAINE HYDROCHLORIDE 10 MG/ML
10 INJECTION INFILTRATION; PERINEURAL
OUTPATIENT
Start: 2018-12-20 | End: 2018-12-20

## 2018-12-20 RX ORDER — LIDOCAINE HYDROCHLORIDE 10 MG/ML
INJECTION, SOLUTION EPIDURAL; INFILTRATION; INTRACAUDAL; PERINEURAL
Status: COMPLETED
Start: 2018-12-20 | End: 2018-12-20

## 2018-12-20 RX ORDER — HEPARIN SODIUM 200 [USP'U]/100ML
500 INJECTION, SOLUTION INTRAVENOUS ONCE
Status: COMPLETED | OUTPATIENT
Start: 2018-12-20 | End: 2018-12-20

## 2018-12-20 RX ORDER — FENTANYL CITRATE 50 UG/ML
100 INJECTION, SOLUTION INTRAMUSCULAR; INTRAVENOUS
Status: DISCONTINUED | OUTPATIENT
Start: 2018-12-20 | End: 2018-12-20

## 2018-12-20 RX ORDER — MIDAZOLAM HYDROCHLORIDE 1 MG/ML
.5-1 INJECTION, SOLUTION INTRAMUSCULAR; INTRAVENOUS
Status: DISCONTINUED | OUTPATIENT
Start: 2018-12-20 | End: 2018-12-20 | Stop reason: HOSPADM

## 2018-12-20 RX ADMIN — LIDOCAINE HYDROCHLORIDE 20 ML: 10 INJECTION, SOLUTION INFILTRATION; PERINEURAL at 14:31

## 2018-12-20 RX ADMIN — MIDAZOLAM HYDROCHLORIDE 2 MG: 1 INJECTION, SOLUTION INTRAMUSCULAR; INTRAVENOUS at 13:14

## 2018-12-20 RX ADMIN — LIDOCAINE HYDROCHLORIDE 10 ML: 10 INJECTION, SOLUTION EPIDURAL; INFILTRATION; INTRACAUDAL; PERINEURAL at 13:33

## 2018-12-20 RX ADMIN — FENTANYL CITRATE 50 MCG: 50 INJECTION, SOLUTION INTRAMUSCULAR; INTRAVENOUS at 14:30

## 2018-12-20 RX ADMIN — FENTANYL CITRATE 25 MCG: 50 INJECTION, SOLUTION INTRAMUSCULAR; INTRAVENOUS at 13:17

## 2018-12-20 RX ADMIN — MIDAZOLAM 1 MG: 1 INJECTION INTRAMUSCULAR; INTRAVENOUS at 14:34

## 2018-12-20 RX ADMIN — FENTANYL CITRATE 25 MCG: 50 INJECTION, SOLUTION INTRAMUSCULAR; INTRAVENOUS at 14:41

## 2018-12-20 RX ADMIN — LIDOCAINE HYDROCHLORIDE,EPINEPHRINE BITARTRATE 15 MG: 10; .01 INJECTION, SOLUTION INFILTRATION; PERINEURAL at 14:32

## 2018-12-20 RX ADMIN — FENTANYL CITRATE 25 MCG: 50 INJECTION, SOLUTION INTRAMUSCULAR; INTRAVENOUS at 13:14

## 2018-12-20 RX ADMIN — MIDAZOLAM HYDROCHLORIDE 1 MG: 1 INJECTION, SOLUTION INTRAMUSCULAR; INTRAVENOUS at 13:16

## 2018-12-20 RX ADMIN — HEPARIN SODIUM IN SODIUM CHLORIDE 1000 UNITS: 200 INJECTION INTRAVENOUS at 14:31

## 2018-12-20 RX ADMIN — Medication 2 G: at 14:10

## 2018-12-20 RX ADMIN — MIDAZOLAM 2 MG: 1 INJECTION INTRAMUSCULAR; INTRAVENOUS at 14:30

## 2018-12-20 RX ADMIN — MIDAZOLAM HYDROCHLORIDE 1 MG: 1 INJECTION, SOLUTION INTRAMUSCULAR; INTRAVENOUS at 13:20

## 2018-12-20 RX ADMIN — FENTANYL CITRATE 25 MCG: 50 INJECTION, SOLUTION INTRAMUSCULAR; INTRAVENOUS at 14:34

## 2018-12-20 RX ADMIN — FENTANYL CITRATE 25 MCG: 50 INJECTION, SOLUTION INTRAMUSCULAR; INTRAVENOUS at 13:20

## 2018-12-20 NOTE — DISCHARGE INSTRUCTIONS
Implanted Port Care for Chemotherapy: Care Instructions  Your Care Instructions  An implanted port is a device placed, in most cases, under the skin of your chest below your collarbone. It is made of plastic, stainless steel, or titanium. The port is about the size of a quarter, but thicker. A thin, flexible tube called a catheter runs from the port into a large vein. A membrane (septum) similar to a pencil eraser is in the center of the port. A nurse uses a needle to put chemotherapy or other medicine and fluids through the septum into a blood vessel. A health professional also can take blood for tests through the port. An implanted port can be used for months. A special needle (called a Altamirano needle) may stay in the port for a short time. The port and catheter need regular care to make sure they do not get blocked. Tell your doctor if you take aspirin or some other blood thinner. These medicines can increase the chance of bleeding inside your body. Follow-up care is a key part of your treatment and safety. Be sure to make and go to all appointments, and call your doctor if you are having problems. It's also a good idea to know your test results and keep a list of the medicines you take. How can you care for yourself at home? · You will probably need to take 1 day off from work and will be able return to normal activities shortly after. This depends on the type of work you do, why you have the port, and how you feel. · You probably will be able to bathe and swim. But you may need to avoid some activities if a Altamirano needle is left in the port. Talk to your doctor about any limits on your activity. · Some clothes may rub the skin over the port. Do not wear a bra or suspenders that irritate your skin near the port. Do not have your blood pressure taken on the arm with the port. · You will get a medical alert card with information about your port. Carry this with you.  It will tell health care workers you have a port in case you need emergency care. · Your port will need regular flushing to keep it open. A nurse or other health professional will do this for you. When should you call for help? Call your doctor now or seek immediate medical care if:    · You have signs of infection, such as:  ? Increased pain, swelling, warmth, or redness near the port. ? Red streaks leading from the port. ? Pus draining from the port. ? A fever.     · You have pain or swelling in your neck or arm.     · You have trouble breathing.    Watch closely for changes in your health, and be sure to contact your doctor if:    · You have any problems with your port. Where can you learn more? Go to http://manisha-anne marie.info/. Enter [id number] in the search box to learn more about \"Implanted [facility] [place] for Chemotherapy: Care Instructions. \"  Current as of: November 20, 2017  Content Version: 11.8  © 8303-0377 [employer]. Care instructions adapted under license by [employer] (which disclaims liability or warranty for this information). If you have questions about a medical condition or this instruction, always ask your healthcare professional. [place] [address] any warranty or liability for your use of this information. Bone Marrow Aspiration and Biopsy: What to Expect at Home  Your Recovery  The biopsy site may feel sore for several days. It can help to walk, take pain medicine, and put ice packs on the site. You will probably be able to return to work and your usual activities the day after the procedure. Your doctor or nurse will call you with the results of your test.  This care sheet gives you a general idea about how long it will take for you to recover. But each person recovers at a different pace. Follow the steps below to get better as quickly as possible. How can you care for yourself at home? Activity    · Rest when you feel tired.  Getting enough sleep will help you recover.     · You may drive when you are no longer taking pain pills and can quickly move your foot from the gas pedal to the brake. You must also be able to sit comfortably for a long period of time, even if you do not plan to go far. You might get caught in traffic.     · Most people are able to return to work the day after the procedure. Medicines    · Your doctor will tell you if and when you can restart your medicines. He or she will also give you instructions about taking any new medicines.     · If you take blood thinners, such as warfarin (Coumadin), clopidogrel (Plavix), or aspirin, be sure to talk to your doctor. He or she will tell you if and when to start taking those medicines again. Make sure that you understand exactly what your doctor wants you to do.     · Be safe with medicines. Take pain medicines exactly as directed. ? If the doctor gave you a prescription medicine for pain, take it as prescribed. ? If you are not taking a prescription pain medicine, take an over-the-counter medicine such as acetaminophen (Tylenol), ibuprofen (Advil, Motrin), or naproxen (Aleve). Read and follow all instructions on the label. ? Do not take two or more pain medicines at the same time unless the doctor told you to. Many pain medicines have acetaminophen, which is Tylenol. Too much acetaminophen (Tylenol) can be harmful.     · If you think your pain medicine is making you sick to your stomach:  ? Take your medicine after meals (unless your doctor has told you not to). ? Ask your doctor for a different pain medicine.     · If your doctor prescribed antibiotics, take them as directed. Do not stop taking them just because you feel better.    Ice    · Put ice or a cold pack on the biopsy site for 10 to 20 minutes at a time. Put a thin cloth between the ice and your skin. Follow-up care is a key part of your treatment and safety.  Be sure to make and go to all appointments, and call your doctor if you are having problems. It's also a good idea to know your test results and keep a list of the medicines you take. When should you call for help? Call 911 anytime you think you may need emergency care. For example, call if:    · You passed out (lost consciousness).    Call your doctor now or seek immediate medical care if:    · You have signs of infection, such as:  ? Increased pain, swelling, warmth, or redness. ? Red streaks leading from the biopsy site. ? Pus draining from the biopsy site. ? Swollen lymph nodes in your neck, armpits, or groin. ? A fever.    Watch closely for any changes in your health, and be sure to contact your doctor if:    · You are not getting better as expected. Where can you learn more? Go to http://manisha-anne marie.info/. Enter E148 in the search box to learn more about \"Bone Marrow Aspiration and Biopsy: What to Expect at Home. \"  Current as of: September 10, 2017  Content Version: 11.8  © 1233-0431 [employer]. Care instructions adapted under license by [employer] (which disclaims liability or warranty for this information). If you have questions about a medical condition or this instruction, always ask your healthcare professional. [place] [address] any warranty or liability for your use of this information.

## 2018-12-20 NOTE — PROGRESS NOTES
Pt received in pre/post with ADA Villar for scheduled bone marrow bx and port placement with Dr Walter Enamorado. IV in R forearm and labs drawn in [address]. Mallampati 2 with 3 fingers, LS clear, HS S1, S2. Vitals stable. Walter Enamorado consented for both procedures. NKA and  NPO confirmed. 1300--to CT, timeout at 1321, start time 1322. Stop time [phone] [id number]. Pt tolerated well, total of 4 mg versed and75 mcg fentanyl given. Clean dressing on low back. Pt states no pain. Returned to 2nd floor, report given to Nicky Villar RN.

## 2018-12-20 NOTE — H&P
Radiology History and Physical    Patient: [facility] 41 y.o. male       Chief Complaint: No chief complaint on file. History of Present Illness: port placement, anemia    History:    Past Medical History:   Diagnosis Date    Anxiety     Hyperlipidemia     Hypertension     Lymphadenopathy 11/12/2018     Family History   Problem Relation Age of Onset    Hypertension Father     Diabetes Mother      Social History     Socioeconomic History    Marital status: SINGLE     Spouse name: Not on file    Number of children: Not on file    Years of education: Not on file    Highest education level: Not on file   Social Needs    Financial resource strain: Not on file    Food insecurity - worry: Not on file    Food insecurity - inability: Not on file   [employer] needs - medical: Not on file   [employer] needs - non-medical: Not on file   Occupational History    Not on file   Tobacco Use    Smoking status: Current Every Day Smoker     Packs/day: 1.00     Years: 20.00     Pack years: 20.00    Smokeless tobacco: Never Used   Substance and Sexual Activity    Alcohol use: No     Frequency: Never    Drug use: No    Sexual activity: Yes   Other Topics Concern    Not on file   Social History Narrative    Not on file       Allergies: No Known Allergies    Current Medications:  No current facility-administered medications for this encounter. Physical Exam:  Blood pressure 118/79, pulse 82, temperature 98.4 °F (36.9 °C), resp. rate 15, height 5' 7\" (1.702 m), weight 67 kg (147 lb 11.3 oz), SpO2 97 %. GENERAL: alert, cooperative, no distress, appears stated age, LUNG: clear to auscultation bilaterally, HEART: regular rate and rhythm, S1, S2 normal, no murmur, click, rub or gallop      Alerts:    Hospital Problems  Date Reviewed: 12/13/2018    None          Laboratory:    No results for input(s): HGB, HCT, WBC, PLT, INR, BUN, CREA, K, CRCLT, HGBEXT, HCTEXT, PLTEXT in the last 72 hours.     No lab exists for component: PTT, PT, INREXT      Plan of Care/Planned Procedure:  Risks, benefits, and alternatives reviewed with patient and he agrees to proceed with the procedure.        Riya Woodall MD

## 2018-12-20 NOTE — PROGRESS NOTES
Patient arrived. ID and allergies verified verbally with patient. Pt voices understanding of procedure to be performed. Consent obtained. Pt prepped for procedure. Pt having Ct guided bone marrow biopsy on 1st floor prior to port placement. Radiology nurse Radha Mares also at [facility] with patient. 12:56 PM  Dr Brenda Wilkerson at [facility] pt consented for both procedures. 1337 TRANSFER - IN REPORT:    Verbal report received from Jairo(name) on Bhavin Em  being received from radiology(unit) for routine progression of care      Report consisted of patients Situation, Background, Assessment and   Recommendations(SBAR). Information from the following report(s) Procedure Summary was reviewed with the receiving nurse. Opportunity for questions and clarification was provided. Assessment completed upon patients arrival to unit and care assumed. 1:59 PM  TRANSFER - OUT REPORT:    Verbal report given to Stacey(name) on Bhavin Em  being transferred to Angio(unit) for ordered procedure       Report consisted of patients Situation, Background, Assessment and   Recommendations(SBAR). Information from the following report(s) SBAR was reviewed with the receiving nurse. Lines:   Peripheral IV 12/20/18 Anterior;Distal;Inferior; Lower;Right Arm (Active)        Opportunity for questions and clarification was provided. Patient transported with:   Registered Nurse     [id number] TRANSFER - IN REPORT:    Verbal report received from Natali(name) on Bhavin Em  being received from angio(unit) for routine progression of care      Report consisted of patients Situation, Background, Assessment and   Recommendations(SBAR). Information from the following report(s) Procedure Summary was reviewed with the receiving nurse. Opportunity for questions and clarification was provided. Assessment completed upon patients arrival to unit and care assumed. 1530 pt sitting up in bed. No complaints    1555 Pt eating. No complaints    1609 Father at bedside. Discharge instructions reviewed with patient and family. Voiced understanding. Patient given copy of discharge instructions to take home. 80 Pt discharged via wheelchair with tech to fathers care. Personal belongings with patient upon discharge.

## 2018-12-27 ENCOUNTER — HOSPITAL ENCOUNTER (OUTPATIENT)
Dept: INFUSION THERAPY | Age: 43
Discharge: HOME | End: 2018-12-27
Payer: SUBSIDIZED

## 2018-12-27 VITALS
OXYGEN SATURATION: 100 % | TEMPERATURE: 100 F | SYSTOLIC BLOOD PRESSURE: 107 MMHG | WEIGHT: 156.1 LBS | RESPIRATION RATE: 16 BRPM | BODY MASS INDEX: 24.45 KG/M2 | HEART RATE: 94 BPM | DIASTOLIC BLOOD PRESSURE: 67 MMHG

## 2018-12-27 DIAGNOSIS — C82.90 FOLLICULAR LYMPHOMA, UNSPECIFIED FOLLICULAR LYMPHOMA TYPE, UNSPECIFIED BODY REGION (HCC): Primary | ICD-10-CM

## 2018-12-27 LAB
ALBUMIN SERPL-MCNC: 3.6 G/DL (ref 3.5–5)
ALBUMIN/GLOB SERPL: 1 {RATIO} (ref 1.1–2.2)
ALP SERPL-CCNC: 99 U/L (ref 45–117)
ALT SERPL-CCNC: 15 U/L (ref 12–78)
ANION GAP SERPL CALC-SCNC: 8 MMOL/L (ref 5–15)
AST SERPL-CCNC: 11 U/L (ref 15–37)
BASOPHILS # BLD: 0.1 K/UL (ref 0–0.1)
BASOPHILS NFR BLD: 1 % (ref 0–1)
BILIRUB SERPL-MCNC: 0.2 MG/DL (ref 0.2–1)
BUN SERPL-MCNC: 19 MG/DL (ref 6–20)
BUN/CREAT SERPL: 17 (ref 12–20)
CALCIUM SERPL-MCNC: 9.6 MG/DL (ref 8.5–10.1)
CHLORIDE SERPL-SCNC: 101 MMOL/L (ref 97–108)
CO2 SERPL-SCNC: 28 MMOL/L (ref 21–32)
CREAT SERPL-MCNC: 1.15 MG/DL (ref 0.7–1.3)
DIFFERENTIAL METHOD BLD: NORMAL
EOSINOPHIL # BLD: 0.4 K/UL (ref 0–0.4)
EOSINOPHIL NFR BLD: 4 % (ref 0–7)
ERYTHROCYTE [DISTWIDTH] IN BLOOD BY AUTOMATED COUNT: 14.1 % (ref 11.5–14.5)
GLOBULIN SER CALC-MCNC: 3.5 G/DL (ref 2–4)
GLUCOSE SERPL-MCNC: 90 MG/DL (ref 65–100)
HAV IGM SER QL: NONREACTIVE
HBV CORE IGM SER QL: NONREACTIVE
HBV SURFACE AG SER QL: <0.1 INDEX
HBV SURFACE AG SER QL: NEGATIVE
HCT VFR BLD AUTO: 41.3 % (ref 36.6–50.3)
HCV AB SERPL QL IA: NONREACTIVE
HCV COMMENT,HCGAC: NORMAL
HGB BLD-MCNC: 13.6 G/DL (ref 12.1–17)
IMM GRANULOCYTES # BLD: 0 K/UL (ref 0–0.04)
IMM GRANULOCYTES NFR BLD AUTO: 0 % (ref 0–0.5)
LYMPHOCYTES # BLD: 2 K/UL (ref 0.8–3.5)
LYMPHOCYTES NFR BLD: 20 % (ref 12–49)
MCH RBC QN AUTO: 30 PG (ref 26–34)
MCHC RBC AUTO-ENTMCNC: 32.9 G/DL (ref 30–36.5)
MCV RBC AUTO: 91.2 FL (ref 80–99)
MONOCYTES # BLD: 0.9 K/UL (ref 0–1)
MONOCYTES NFR BLD: 9 % (ref 5–13)
NEUTS SEG # BLD: 6.6 K/UL (ref 1.8–8)
NEUTS SEG NFR BLD: 66 % (ref 32–75)
NRBC # BLD: 0 K/UL (ref 0–0.01)
NRBC BLD-RTO: 0 PER 100 WBC
PLATELET # BLD AUTO: 240 K/UL (ref 150–400)
PMV BLD AUTO: 11.3 FL (ref 8.9–12.9)
POTASSIUM SERPL-SCNC: 4.3 MMOL/L (ref 3.5–5.1)
PROT SERPL-MCNC: 7.1 G/DL (ref 6.4–8.2)
RBC # BLD AUTO: 4.53 M/UL (ref 4.1–5.7)
SODIUM SERPL-SCNC: 137 MMOL/L (ref 136–145)
SP1: NORMAL
SP2: NORMAL
SP3: NORMAL
WBC # BLD AUTO: 10 K/UL (ref 4.1–11.1)

## 2018-12-27 PROCEDURE — 74011250636 HC RX REV CODE- 250/636: Performed by: INTERNAL MEDICINE

## 2018-12-27 PROCEDURE — 74011250636 HC RX REV CODE- 250/636

## 2018-12-27 PROCEDURE — 80053 COMPREHEN METABOLIC PANEL: CPT

## 2018-12-27 PROCEDURE — 96367 TX/PROPH/DG ADDL SEQ IV INF: CPT

## 2018-12-27 PROCEDURE — 85025 COMPLETE CBC W/AUTO DIFF WBC: CPT

## 2018-12-27 PROCEDURE — 96375 TX/PRO/DX INJ NEW DRUG ADDON: CPT

## 2018-12-27 PROCEDURE — 80074 ACUTE HEPATITIS PANEL: CPT

## 2018-12-27 PROCEDURE — 74011250637 HC RX REV CODE- 250/637: Performed by: INTERNAL MEDICINE

## 2018-12-27 PROCEDURE — 36415 COLL VENOUS BLD VENIPUNCTURE: CPT

## 2018-12-27 RX ORDER — DIPHENHYDRAMINE HYDROCHLORIDE 50 MG/ML
50 INJECTION, SOLUTION INTRAMUSCULAR; INTRAVENOUS AS NEEDED
Status: ACTIVE | OUTPATIENT
Start: 2018-12-27 | End: 2018-12-28

## 2018-12-27 RX ORDER — EPINEPHRINE 1 MG/ML
0.3 INJECTION, SOLUTION, CONCENTRATE INTRAVENOUS AS NEEDED
Status: ACTIVE | OUTPATIENT
Start: 2018-12-27 | End: 2018-12-28

## 2018-12-27 RX ORDER — ONDANSETRON 2 MG/ML
8 INJECTION INTRAMUSCULAR; INTRAVENOUS AS NEEDED
Status: ACTIVE | OUTPATIENT
Start: 2018-12-27 | End: 2018-12-28

## 2018-12-27 RX ORDER — HEPARIN 100 UNIT/ML
300-500 SYRINGE INTRAVENOUS AS NEEDED
Status: ACTIVE | OUTPATIENT
Start: 2018-12-27 | End: 2018-12-27

## 2018-12-27 RX ORDER — SODIUM CHLORIDE 0.9 % (FLUSH) 0.9 %
10 SYRINGE (ML) INJECTION AS NEEDED
Status: ACTIVE | OUTPATIENT
Start: 2018-12-27 | End: 2018-12-27

## 2018-12-27 RX ORDER — DIPHENHYDRAMINE HYDROCHLORIDE 50 MG/ML
50 INJECTION, SOLUTION INTRAMUSCULAR; INTRAVENOUS ONCE
Status: COMPLETED | OUTPATIENT
Start: 2018-12-27 | End: 2018-12-27

## 2018-12-27 RX ORDER — SODIUM CHLORIDE 9 MG/ML
25 INJECTION, SOLUTION INTRAVENOUS CONTINUOUS
Status: DISPENSED | OUTPATIENT
Start: 2018-12-27 | End: 2018-12-27

## 2018-12-27 RX ORDER — HYDROCORTISONE SODIUM SUCCINATE 100 MG/2ML
100 INJECTION, POWDER, FOR SOLUTION INTRAMUSCULAR; INTRAVENOUS AS NEEDED
Status: ACTIVE | OUTPATIENT
Start: 2018-12-27 | End: 2018-12-28

## 2018-12-27 RX ORDER — ACETAMINOPHEN 325 MG/1
650 TABLET ORAL AS NEEDED
Status: DISPENSED | OUTPATIENT
Start: 2018-12-27 | End: 2018-12-28

## 2018-12-27 RX ORDER — SODIUM CHLORIDE 9 MG/ML
100 INJECTION, SOLUTION INTRAVENOUS CONTINUOUS
Status: DISPENSED | OUTPATIENT
Start: 2018-12-27 | End: 2018-12-27

## 2018-12-27 RX ORDER — DIPHENHYDRAMINE HYDROCHLORIDE 50 MG/ML
INJECTION, SOLUTION INTRAMUSCULAR; INTRAVENOUS
Status: COMPLETED
Start: 2018-12-27 | End: 2018-12-27

## 2018-12-27 RX ORDER — ACETAMINOPHEN 325 MG/1
650 TABLET ORAL AS NEEDED
Status: ACTIVE | OUTPATIENT
Start: 2018-12-27 | End: 2018-12-28

## 2018-12-27 RX ORDER — ACETAMINOPHEN 325 MG/1
650 TABLET ORAL ONCE
Status: COMPLETED | OUTPATIENT
Start: 2018-12-27 | End: 2018-12-27

## 2018-12-27 RX ORDER — ONDANSETRON 2 MG/ML
8 INJECTION INTRAMUSCULAR; INTRAVENOUS AS NEEDED
Status: DISPENSED | OUTPATIENT
Start: 2018-12-27 | End: 2018-12-28

## 2018-12-27 RX ORDER — SODIUM CHLORIDE 9 MG/ML
10 INJECTION INTRAMUSCULAR; INTRAVENOUS; SUBCUTANEOUS AS NEEDED
Status: ACTIVE | OUTPATIENT
Start: 2018-12-27 | End: 2018-12-27

## 2018-12-27 RX ORDER — FAMOTIDINE 10 MG/ML
INJECTION INTRAVENOUS
Status: COMPLETED
Start: 2018-12-27 | End: 2018-12-27

## 2018-12-27 RX ORDER — ALBUTEROL SULFATE 0.83 MG/ML
2.5 SOLUTION RESPIRATORY (INHALATION) AS NEEDED
Status: ACTIVE | OUTPATIENT
Start: 2018-12-27 | End: 2018-12-28

## 2018-12-27 RX ADMIN — ONDANSETRON HYDROCHLORIDE 8 MG: 2 INJECTION INTRAMUSCULAR; INTRAVENOUS at 15:32

## 2018-12-27 RX ADMIN — ACETAMINOPHEN 650 MG: 325 TABLET ORAL at 10:05

## 2018-12-27 RX ADMIN — DIPHENHYDRAMINE HYDROCHLORIDE 50 MG: 50 INJECTION INTRAMUSCULAR; INTRAVENOUS at 10:09

## 2018-12-27 RX ADMIN — ACETAMINOPHEN 650 MG: 325 TABLET ORAL at 16:33

## 2018-12-27 RX ADMIN — METHYLPREDNISOLONE SODIUM SUCCINATE 125 MG: 125 INJECTION, POWDER, FOR SOLUTION INTRAMUSCULAR; INTRAVENOUS at 15:23

## 2018-12-27 RX ADMIN — SODIUM CHLORIDE 100 ML/HR: 900 INJECTION, SOLUTION INTRAVENOUS at 11:26

## 2018-12-27 RX ADMIN — DIPHENHYDRAMINE HYDROCHLORIDE 50 MG: 50 INJECTION INTRAMUSCULAR; INTRAVENOUS at 15:08

## 2018-12-27 RX ADMIN — SODIUM CHLORIDE 500 ML: 900 INJECTION, SOLUTION INTRAVENOUS at 10:10

## 2018-12-27 RX ADMIN — OBINUTUZUMAB 1000 MG: 1000 INJECTION, SOLUTION, CONCENTRATE INTRAVENOUS at 11:25

## 2018-12-27 RX ADMIN — FAMOTIDINE: 10 INJECTION, SOLUTION INTRAVENOUS at 15:03

## 2018-12-27 NOTE — PROGRESS NOTES
[facility] VISIT NOTE    Pt arrived at [facility] ambulatory and in no distress for C1D1 Gazyva. Assessment completed, pt c/o . Patient Vitals for the past 12 hrs:   Temp Pulse Resp BP SpO2   12/27/18 1651 100 °F (37.8 °C) 94 16 107/67 --   12/27/18 1632 (!) 100.9 °F (38.3 °C) (!) 106 16 101/67 --   12/27/18 1615 98.9 °F (37.2 °C) (!) 108 16 103/66 --   12/27/18 1525 (!) 100.5 °F (38.1 °C) (!) 125 16 101/65 --   12/27/18 1455 (!) 100.9 °F (38.3 °C) (!) 102 16 111/68 --   12/27/18 1425 97.6 °F (36.4 °C) (!) 102 16 110/67 --   12/27/18 1355 99 °F (37.2 °C) (!) 102 16 114/73 --   12/27/18 1325 98.7 °F (37.1 °C) (!) 110 16 120/78 --   12/27/18 1255 97.2 °F (36.2 °C) 87 16 124/87 --   12/27/18 1225 97.5 °F (36.4 °C) 84 16 113/74 --   12/27/18 1155 98.3 °F (36.8 °C) 83 16 107/66 --   12/27/18 0758 97.1 °F (36.2 °C) (!) 104 18 115/80 100 %       Right chest port accessed with  in joshi no difficulty. Positive blood return noted and labs drawn. Recent Results (from the past 12 hour(s))   CBC WITH AUTOMATED DIFF    Collection Time: 12/27/18  8:19 AM   Result Value Ref Range    WBC 10.0 4.1 - 11.1 K/uL    RBC 4.53 4.10 - 5.70 M/uL    HGB 13.6 12.1 - 17.0 g/dL    HCT 41.3 36.6 - 50.3 %    MCV 91.2 80.0 - 99.0 FL    MCH 30.0 26.0 - 34.0 PG    MCHC 32.9 30.0 - 36.5 g/dL    RDW 14.1 11.5 - 14.5 %    PLATELET 736 955 - 781 K/uL    MPV 11.3 8.9 - 12.9 FL    NRBC 0.0 0  WBC    ABSOLUTE NRBC 0.00 0.00 - 0.01 K/uL    NEUTROPHILS 66 32 - 75 %    LYMPHOCYTES 20 12 - 49 %    MONOCYTES 9 5 - 13 %    EOSINOPHILS 4 0 - 7 %    BASOPHILS 1 0 - 1 %    IMMATURE GRANULOCYTES 0 0.0 - 0.5 %    ABS. NEUTROPHILS 6.6 1.8 - 8.0 K/UL    ABS. LYMPHOCYTES 2.0 0.8 - 3.5 K/UL    ABS. MONOCYTES 0.9 0.0 - 1.0 K/UL    ABS. EOSINOPHILS 0.4 0.0 - 0.4 K/UL    ABS. BASOPHILS 0.1 0.0 - 0.1 K/UL    ABS. IMM.  GRANS. 0.0 0.00 - 0.04 K/UL    DF AUTOMATED     METABOLIC PANEL, COMPREHENSIVE    Collection Time: 12/27/18  8:19 AM   Result Value Ref Range Sodium 137 136 - 145 mmol/L    Potassium 4.3 3.5 - 5.1 mmol/L    Chloride 101 97 - 108 mmol/L    CO2 28 21 - 32 mmol/L    Anion gap 8 5 - 15 mmol/L    Glucose 90 65 - 100 mg/dL    BUN 19 6 - 20 MG/DL    Creatinine 1.15 0.70 - 1.30 MG/DL    BUN/Creatinine ratio 17 12 - 20      GFR est AA >60 >60 ml/min/1.73m2    GFR est non-AA >60 >60 ml/min/1.73m2    Calcium 9.6 8.5 - 10.1 MG/DL    Bilirubin, total 0.2 0.2 - 1.0 MG/DL    ALT (SGPT) 15 12 - 78 U/L    AST (SGOT) 11 (L) 15 - 37 U/L    Alk. phosphatase 99 45 - 117 U/L    Protein, total 7.1 6.4 - 8.2 g/dL    Albumin 3.6 3.5 - 5.0 g/dL    Globulin 3.5 2.0 - 4.0 g/dL    A-G Ratio 1.0 (L) 1.1 - 2.2       Medications received:  500 cc NS Bolus IV  Tylenol PO  Benedryl PO  Gazyva IV  Pepcid IV  Benedryl IV  Solumedrol IV  Zofran IV    1501:  Pt c/o back pain and pain in his abdomen. Infusion stopped. Bolus started at 500ml/hr. Pepcid and Benedryl Given    1517:  Pt began vomiting. Solumedrol and Zofran given    1530:  Called MD.  Told to resume when symptoms subside at rate prior to patient becoming ill. 1545:  Infusion restarted at 90ml/hr. Tolerated treatment well, no adverse reaction noted. Port reinforced with dressing so patient would already have access for tomorrow's infusion. D/C'd from [facility] ambulatory and in no distress accompanied by father and aunt. Next appointment is 12/28/18 at 8:00.

## 2018-12-27 NOTE — PROGRESS NOTES
Problem: Chemotherapy Treatment  Goal: *Chemotherapy regimen followed  Outcome: Progressing Towards Goal  Pt here for [facility]

## 2018-12-28 ENCOUNTER — DOCUMENTATION ONLY (OUTPATIENT)
Dept: ONCOLOGY | Age: 43
End: 2018-12-28

## 2018-12-28 ENCOUNTER — OFFICE VISIT (OUTPATIENT)
Dept: ONCOLOGY | Age: 43
End: 2018-12-28

## 2018-12-28 ENCOUNTER — HOSPITAL ENCOUNTER (OUTPATIENT)
Dept: INFUSION THERAPY | Age: 43
Discharge: HOME | End: 2018-12-28
Payer: SUBSIDIZED

## 2018-12-28 VITALS
WEIGHT: 152.8 LBS | DIASTOLIC BLOOD PRESSURE: 68 MMHG | BODY MASS INDEX: 23.98 KG/M2 | SYSTOLIC BLOOD PRESSURE: 124 MMHG | HEART RATE: 93 BPM | RESPIRATION RATE: 18 BRPM | HEIGHT: 67 IN | TEMPERATURE: 98 F | OXYGEN SATURATION: 100 %

## 2018-12-28 VITALS
SYSTOLIC BLOOD PRESSURE: 127 MMHG | HEART RATE: 102 BPM | BODY MASS INDEX: 23.98 KG/M2 | WEIGHT: 152.8 LBS | DIASTOLIC BLOOD PRESSURE: 75 MMHG | OXYGEN SATURATION: 100 % | TEMPERATURE: 98.2 F | HEIGHT: 67 IN

## 2018-12-28 DIAGNOSIS — F41.9 ANXIETY: ICD-10-CM

## 2018-12-28 DIAGNOSIS — C82.90 FOLLICULAR LYMPHOMA, UNSPECIFIED FOLLICULAR LYMPHOMA TYPE, UNSPECIFIED BODY REGION (HCC): Primary | ICD-10-CM

## 2018-12-28 DIAGNOSIS — Z51.11 CHEMOTHERAPY MANAGEMENT, ENCOUNTER FOR: ICD-10-CM

## 2018-12-28 DIAGNOSIS — G89.3 ACUTE NEOPLASM-RELATED PAIN: ICD-10-CM

## 2018-12-28 DIAGNOSIS — C82.33 FOLLICULAR LYMPHOMA GRADE IIIA OF INTRA-ABDOMINAL LYMPH NODES (HCC): Primary | ICD-10-CM

## 2018-12-28 DIAGNOSIS — I10 HYPERTENSION, UNSPECIFIED TYPE: ICD-10-CM

## 2018-12-28 LAB
COMMENT, HOLDF: NORMAL
HIV 1+2 AB+HIV1 P24 AG SERPL QL IA: NONREACTIVE
HIV12 RESULT COMMENT, HHIVC: NORMAL
LDH SERPL L TO P-CCNC: 185 U/L (ref 85–241)
SAMPLES BEING HELD,HOLD: NORMAL
TROPONIN I SERPL-MCNC: <0.05 NG/ML

## 2018-12-28 PROCEDURE — 87389 HIV-1 AG W/HIV-1&-2 AB AG IA: CPT

## 2018-12-28 PROCEDURE — 74011250636 HC RX REV CODE- 250/636: Performed by: INTERNAL MEDICINE

## 2018-12-28 PROCEDURE — 83615 LACTATE (LD) (LDH) ENZYME: CPT

## 2018-12-28 PROCEDURE — 74011000258 HC RX REV CODE- 258: Performed by: INTERNAL MEDICINE

## 2018-12-28 PROCEDURE — 84484 ASSAY OF TROPONIN QUANT: CPT

## 2018-12-28 PROCEDURE — 96411 CHEMO IV PUSH ADDL DRUG: CPT

## 2018-12-28 PROCEDURE — 96413 CHEMO IV INFUSION 1 HR: CPT

## 2018-12-28 PROCEDURE — 74011250636 HC RX REV CODE- 250/636

## 2018-12-28 PROCEDURE — 96367 TX/PROPH/DG ADDL SEQ IV INF: CPT

## 2018-12-28 PROCEDURE — 74011636637 HC RX REV CODE- 636/637: Performed by: INTERNAL MEDICINE

## 2018-12-28 PROCEDURE — 82232 ASSAY OF BETA-2 PROTEIN: CPT

## 2018-12-28 PROCEDURE — 36415 COLL VENOUS BLD VENIPUNCTURE: CPT

## 2018-12-28 PROCEDURE — 77030012965 HC NDL HUBR BBMI -A

## 2018-12-28 PROCEDURE — 96375 TX/PRO/DX INJ NEW DRUG ADDON: CPT

## 2018-12-28 RX ORDER — SODIUM CHLORIDE 9 MG/ML
10 INJECTION INTRAMUSCULAR; INTRAVENOUS; SUBCUTANEOUS AS NEEDED
Status: ACTIVE | OUTPATIENT
Start: 2018-12-28 | End: 2018-12-28

## 2018-12-28 RX ORDER — DOXORUBICIN HYDROCHLORIDE 2 MG/ML
25 INJECTION, SOLUTION INTRAVENOUS ONCE
Status: COMPLETED | OUTPATIENT
Start: 2018-12-28 | End: 2018-12-28

## 2018-12-28 RX ORDER — HEPARIN 100 UNIT/ML
300-500 SYRINGE INTRAVENOUS AS NEEDED
Status: ACTIVE | OUTPATIENT
Start: 2018-12-28 | End: 2018-12-28

## 2018-12-28 RX ORDER — PALONOSETRON 0.05 MG/ML
0.25 INJECTION, SOLUTION INTRAVENOUS ONCE
Status: COMPLETED | OUTPATIENT
Start: 2018-12-28 | End: 2018-12-28

## 2018-12-28 RX ORDER — LORAZEPAM 0.5 MG/1
0.5 TABLET ORAL
Qty: 60 TAB | Refills: 0 | Status: SHIPPED | OUTPATIENT
Start: 2018-12-28 | End: 2019-01-24 | Stop reason: SDUPTHER

## 2018-12-28 RX ORDER — SODIUM CHLORIDE 0.9 % (FLUSH) 0.9 %
10 SYRINGE (ML) INJECTION AS NEEDED
Status: ACTIVE | OUTPATIENT
Start: 2018-12-28 | End: 2018-12-28

## 2018-12-28 RX ORDER — PREDNISONE 20 MG/1
100 TABLET ORAL
Status: COMPLETED | OUTPATIENT
Start: 2018-12-28 | End: 2018-12-28

## 2018-12-28 RX ADMIN — PREDNISONE 60 MG: 20 TABLET ORAL at 10:16

## 2018-12-28 RX ADMIN — SODIUM CHLORIDE 150 MG: 900 INJECTION, SOLUTION INTRAVENOUS at 10:09

## 2018-12-28 RX ADMIN — DOXORUBICIN HYDROCHLORIDE 45.8 MG: 2 INJECTION, SOLUTION INTRAVENOUS at 11:26

## 2018-12-28 RX ADMIN — Medication 10 ML: at 13:00

## 2018-12-28 RX ADMIN — Medication 10 ML: at 10:00

## 2018-12-28 RX ADMIN — Medication 10 ML: at 10:02

## 2018-12-28 RX ADMIN — CYCLOPHOSPHAMIDE 1373 MG: 1 INJECTION, POWDER, FOR SOLUTION INTRAVENOUS; ORAL at 11:41

## 2018-12-28 RX ADMIN — Medication 500 UNITS: at 13:00

## 2018-12-28 RX ADMIN — Medication 10 ML: at 10:06

## 2018-12-28 RX ADMIN — Medication 10 ML: at 10:04

## 2018-12-28 RX ADMIN — SODIUM CHLORIDE 10 ML: 9 INJECTION INTRAMUSCULAR; INTRAVENOUS; SUBCUTANEOUS at 10:06

## 2018-12-28 RX ADMIN — PALONOSETRON HYDROCHLORIDE 0.25 MG: 0.25 INJECTION, SOLUTION INTRAVENOUS at 10:12

## 2018-12-28 RX ADMIN — VINCRISTINE SULFATE 2 MG: 1 INJECTION, SOLUTION INTRAVENOUS at 12:31

## 2018-12-28 NOTE — PATIENT INSTRUCTIONS
Prednisone instructions: Take Prednisone 100mg (5 of the 20mg tablets) daily for 5 days (days 1-5 of each cycle). Cycle 1 started 12/27, so you take Prednisone 12/27, 12/28, 12/29, 12/30, 12/31. Cycle 2 starts on 1/17/19, so you take Prednisone on 1/17, 1/18, 1/19, 1/20, 1/21. Cycle 3 starts on 2/7/19, etc.        Anti-nausea instructions: Take your nausea medication (zofran or compazine) three times a day or before meals on a scheduled basis for 3 days after chemotherapy. After those 3 days, you can stop and just use this medication as needed for nausea.

## 2018-12-28 NOTE — LETTER
Sebastian Phillips PTI:[birth year] MR #:[id number] [address] Page 1 of 5 CONTROLLED SUBSTANCE AGREEMENT I may be prescribed medications that are controlled substances as part  of my treatment plan for management of my medical condition(s). The goal of my treatment plan is to maintain and/or improve my health and wellbeing. Because controlled substances have an increased risk of abuse or harm, continual re-evaluation is needed determine if the goals of my treatment plan are being met for my safety and the safety of others. Mina Said  am entering into this Controlled Substance Agreement with my provider, ________Dr. Bowers_____________________ at [address] . I understand that successful treatment requires mutual trust and honesty between me and my provider. I understand that there are state and federal laws and regulations which apply to the medications that my provider may prescribe that must be followed. I understand there are risks and benefits ts of taking the medicines that my provider may prescribe. I understand and agree that following this Agreement is necessary in continuing my provider-patient relationship and success of my treatment plan. As a part of my treatment plan, I agree to the following: COMMUNICATION: 
 
1. I will communicate fully with my provider about my medical condition(s), including the effect on my daily life and how well my medications are helping. I will tell my provider all of the medications that I take for any reason, including medications I receive from another health care provider, and will notify my provider about all issues, problems or concerns, including any side effects, which may be related to my medications. I understand that this information allows my provider to adjust my treatment plan to help manage my medical condition.  I understand that this information will become part of my permanent medical record. 2. I will notify my provider if I have a history of alcohol/drug misuse/addiction or if I have had treatment for alcohol/drug addiction in the past, or if I have a new problem with or concern about alcohol/drug use/addiction, because this increases the likelihood of high risk behaviors and may lead to serious medical conditions. 3. Females Only: I will notify my provider if I am or become pregnant, or if I intend to become pregnant, or if I intend to breastfeed. I understand that communication of these issues with my provider is important, due to possible effects my medication could have on an unborn fetus or breastfeeding child. Initials_____ Stephanie Mccray RRA:[birth year] MR #:[id number] [address] Page 2 of 5 MISUSE OF MEDICATIONS / DRUGS: 
 
1. I agree to take all controlled substances as prescribed, and will not misuse or abuse any controlled substances prescribed by my provider. For my safety, I will not increase the amount of medicine I take without first talking with and getting permission from my provider. 2. If I have a medical emergency, another health care provider may prescribe me medication. If I seek emergency treatment, I will notify my provider within seventy-two (72) hours. 3. I understand that my provider may discuss my use and/or possible misuse/abuse of controlled substances and alcohol, as appropriate, with any health care provider involved in my care, pharmacist or legal authority. ILLEGAL DRUGS: 
 
1. I will not use illegal drugs of any kind, including but not limited to marijuana, heroin, cocaine, or any prescription drug which is not prescribed to me. DRUG DIVERSION / PRESCRIPTION FRAUD: 
 
1. I will not share, sell, trade, give away, or otherwise misuse my prescriptions or medications. 2. I will not alter any prescriptions provided to me by my provider. SINGLE PROVIDER: 
 
1. I agree that all controlled substances that I take will be prescribed only by my provider (or his/her covering provider) under this Agreement. This agreement does not prevent me from seeking emergency medical treatment or receiving pain management related to a surgery. PROTECTING MEDICATIONS: 
 
1. I am responsible for keeping my prescriptions and medications in a safe and secure place including safeguarding them from loss or theft. I understand that lost, stolen or damaged/destroyed prescriptions or medications will not be replaced. Initials____ Jennifer Gaines [facility]8/[birth year]96 MR #:[id number] [address] Page 3 of 5 PRESCRIPTION RENEWALS/REFILLS: 
 
1. I will follow my controlled substance medication schedule as prescribed by my provider. 2. I understand and agree that I will make any requests for renewals or refills of my prescriptions only at the time of an office visit or during my providers regular office hours subject to the prescription refill requirements of the individual practice. 3. I understand that my provider may not call in prescriptions for controlled substances to my pharmacy. 4. I understand that my provider may adjust or discontinue these medications as deemed appropriate for my medical treatment plan. This Agreement does not guarantee the prescription of controlled medications. 5. I agree that if my medications are adjusted or discontinued, I will properly dispose of any remaining medications. I understand that I will be required to dispose of any remaining controlled medications prior to being provided with any prescriptions for other controlled medications.  
 
6. I understand that the renewal of my prescription depends on my medical condition, my consistent participation, and my adherence with my treatment plan and this Agreement. 7. I understand that if I do not keep an appointment with my provider, I may not receive a renewal or refill for my controlled substance medication. PRESCRIPTION MONITORING / DRUG TESTIN. I understand that my provider may require me to provide urine, saliva or blood for testing at any time. I understand that this testing will be used to monitor for safety and adherence with my treatment plan and this Agreement. 2. I understand that my provider may ask me to provide an observed urine specimen, which means that a nurse or other health care provider may watch me provide urine, and I agree to cooperate if I am asked to provide an observed specimen. 3. I understand that if I do not provide urine, saliva or blood samples within two (2) hours of my providers request, or other timeframe decided by my provider, my treatment plan could be changed, or my prescriptions and medications may be changed or ended. 4. I understand that urine, saliva and blood test results will be a part of my permanent medical record. Initials_____ Memo Shane TWE: MR #:[id number] [facility] Page 4 of 5 
 
 
1. I authorize my provider and my pharmacy to cooperate fully with any local, state, or federal law enforcement agency in the investigation of any possible misuse, sale, or other diversion of my controlled substance prescriptions or medications. RISKS: 
 
1. I understand that my level of consciousness may be affected from the use of controlled substances, and I understand that there are risks, benefits, effects and potential alternatives (including no treatment) to the medications that my provider has prescribed. 2. I understand that I may become drowsy, tired, dizzy, constipated, and sick to my stomach, or have changes in my mood or in my sleep while taking my medications. I have talked with my provider about these possible side effects, risks, benefits, and alternative treatments, and my provider has answered all of my questions. 3. I understand that I should not suddenly stop taking my medications without first speaking with my provider. I understand that if I suddenly stop taking my medications, I may experience nausea, vomiting, sweating,anxiety, sleeplessness, itching or other uncomfortable feelings. 4. I will not take my medications with alcohol of any kind, including beer, wine or liquor. I understand that drinking alcohol with my medications increases the chances of side effects, including breathing problems or even death. 5. I understand that if I have a history of alcoholism or other drug addiction I may be at increased risk of addiction to my medications. Signs of addiction might include craving, compulsive use, and continued use despite harm. Since addiction is a disease, I understand my provider may decide to change my medications and refer me to appropriate treatment services. I understand that this information would become part of my permanent medical record. Initials_____ Rod Peers RZT:7/25/2069 MR #:[id number] [address] Page 5 of 5  
 
 
6. Females only: Children born to women who regularly take controlled substances are likely to have physical problems and suffer withdrawal symptoms at birth. If I am of child-bearing age, I understand that I should use safe and effective birth control while taking any controlled substances to avoid the impact of medications on an unborn fetus or  child. I agree to notify my provider immediately if I should become pregnant so that my treatment plan can be adjusted. 7. Males only: I understand that chronic use of controlled substances has been associated with low testosterone levels in males which may affect my mood, stamina, sexual desire, and general health. I understand that my provider may order the appropriate laboratory test to determine my testosterone level,and I agree to this testing. ADHERENCE: 
 
1. I understand that if I do not adhere to this Agreement in any way, my provider may change my prescriptions, stop prescribing controlled substances or end our provider-patient relationship. 2. If my provider decides to stop prescribing medication, or decides to end our provider-patient relationship,my provider may require that I taper my medications slowly. If necessary, my provider may also provide a prescription for other medications to treat my withdrawal symptoms. UNDERSTANDING THIS AGREEMENT: 
 
I understand that my provider may adjust or stop my prescriptions for controlled substances based on my medical condition and my treatment plan. I understand that this Agreement does not guarantee that I will be prescribed medications or controlled substances. I understand that controlled substances may be just one part 
of my treatment plan.  
 
My initial on each page and my signature below shows that I have read each page of this Agreement, I have had an opportunity to ask questions, and all of my questions have been answered to my satisfaction by my provider. By signing below, I agree to comply with this Agreement, and I understand that if I do not follow the Agreements listed above, my provider may stop 
 
_________________________________________  Date/Time 12/28/2018 8:53 AM   
             (Patient Signature) 
 
________________________________________    Date/Time 12/28/2018 8:53 AM 
 (Parent or Guardian Signature if <18 yrs) 
 
_________________________________________ Date/Time 12/28/2018 8:53 AM 
 (Provider Signature)

## 2018-12-28 NOTE — PROGRESS NOTES
[id number] [facility] On[facility] at [address] [place] [address]  [phone]    Hematology / Oncology Established Visit    Reason for Visit:   Tess Wihte is a 41 y.o. male who comes in for f/u of lymphoma. Hematology Oncology Treatment History:     Diagnosis: Follicular lymphoma    Stage: IV    Pathology:   11/13/18 right inguinal LN excision: Follicular lymphoma, high-grade (grade 3a of 3). Comment   The delaney architecture is entirely effaced by atypical lymphocytic proliferation with prominent nodular growth pattern. The majority of the atypical lymphocytes are small to medium in size and have irregular nuclear outlines and inconspicuous nucleoli, morphologically consistent with centrocytes. Scattered large lymphocytes with prominent nucleoli, consistent with centroblasts are identified (> 15 per high-power field). Focal increase in mitotic figures is noted. Occasional follicular dendritic cells are seen. By immunohistochemistry, the atypical lymphocytes are positive for CD20, PAX5, CD10, BCL6 and BCL2 (weak, focal) and negative for MUM1. CD3, CD5 and CD43 stain numerous background T lymphocytes. Ki-67 reveals a high proliferation index in the neoplastic follicles (overall 94-30%). Clinical history indicates 14 cm retroperitoneal mass with bilateral inguinal lymphadenopathy. In summary, the combined morphologic and phenotypic findings are diagnostic of a high-grade follicular lymphoma (grade 3a of 3). There is no evidence of diffuse large B-cell lymphoma. Flow cytometry analysis:   Monoclonal B-cell population (47 % of all cells) with mild increase in side and forward scatter properties expressing CD19, CD20, CD23 and CD10 with surface lambda light chain restriction. No phenotypically aberrant T-cell population. Flow cytometry was performed at [facility]   Prior Treatment: None    Current Treatment: Obinutuzumab-CHOP.  Obinutuzumab: 1000 mg weekly on days 1, 8, 15 for cycle 1, then 1000 mg on day 1 q21 days for cycles 2-6, then monotherapy 1000 mg every 21 days for cycle 7, 8 with Cyclophosphamide 750mg/m2, Doxorubicin 50mg/m2, Vincristine 1.4mg/m2 on day 1 and Prednisone 100mg on Days 1-5, every 21 days for a total of 6 cycles.       History of Present Illness:   Mr. Zapata is a 40 y/o male with HTN who comes in for evaluation of lymphoma. He states he was in his usual state of health in early November 2018, but then he developed low back pain and presented to the ER. The pain was described as constant, radiating to the buttocks, without exacerbating or alleviating factors, associated w/ increased urination, no n/v/d, no dysuria, no fever, he's unsure about weight loss. Work-up at outside hospital revealed a retroperitoneal mass seen on CT imaging, and he was transferred to [facility] for further work-up. CT there showed a large retroperitoneal mass encircling the aorta with invasion of the left renal hilum and left adrenal gland. There were bilateral inguinal lymph nodes and moderate left hydronephrosis. He was evaluated while at [facility] and was noted to have palpable nodes in his groin for approximately the past 1 month. No testicular mass, anorexia, weight loss, fevers, night sweats, chest pain, shortness of breath, abdominal pain or nausea. He underwent excisional LN biopsy of right inguinal LN. He was told that he had likely lymphoma. He was advised to follow up as outpatient for PET scan, bone marrow biopsy. However, patient states he was lost to f/u. He never received any calls or appointment details. His aunt urged patient to seek care elsewhere and his PCP recommended [facility]. At our first meeting on 12/13/18, he tells me that he was working full time, feeling well until early Nov 2018. When he developed the left lower back pain, he went to [facility] [place]. No fevers, chills, night sweats. He has lost 15 lbs in the past month due to low appetite. No n/v/d.  No current constipation. No CP, SOB. No h/o cardiac disease aside from HTN, Hyperlipiemia. No hematochezia/melena, hematuria. He has HTN and XOL, which he was taking meds for, but has run out. Has no PCP currently. He is uninsured. He works as a cook, currently working part time. He has children aged 12 and 13. Today, he comes in for follow up. He received cycle 1, day 1 of Obinutuzumab yesterday. He did have some problems during the infusion including nausea, vomiting. Infusion was stopped, he was treated with Solumedrol. Symptoms resolved and infusion restarted. No further problems. No fevers, chills, rash, hives, SOB, rigors. He is currently taking his Oxycodone 5mg twice a day. He asks for refill of Ativan which he takes three times a day for several months to years, prescribed by [facility], per patient. FAMILY HISTORY:  His father has a history of leukemia, currently in remission, treated at [facility]. LYMPHATICS:  Bilateral palpable inguinal adenopathy. Past Medical History:   Diagnosis Date    Anxiety     Hyperlipidemia     Hypertension     Lymphadenopathy 11/12/2018      History reviewed. No pertinent surgical history. Social History     Tobacco Use    Smoking status: Current Every Day Smoker     Packs/day: 1.00     Years: 20.00     Pack years: 20.00    Smokeless tobacco: Never Used   Substance Use Topics    Alcohol use: No     Frequency: Never      Family History   Problem Relation Age of Onset    Hypertension Father     Diabetes Mother      Current Outpatient Medications   Medication Sig    atorvastatin (LIPITOR) 10 mg tablet Take 1 Tab by mouth daily.  hydroCHLOROthiazide (HYDRODIURIL) 12.5 mg tablet Take 1 Tab by mouth daily.  lisinopril (PRINIVIL, ZESTRIL) 10 mg tablet Take 1 Tab by mouth daily.  oxyCODONE (OXYIR) 5 mg capsule Take 1 Cap by mouth every six (6) hours as needed. Max Daily Amount: 20 mg.    senna-docusate (PERICOLACE) 8.6-50 mg per tablet Take 1 Tab by mouth daily.  ondansetron hcl (ZOFRAN) 8 mg tablet Take 1 Tab by mouth every eight (8) hours as needed for Nausea.  predniSONE (DELTASONE) 20 mg tablet Take 100 mg by mouth daily (with breakfast). (on days 1-5 of each chemo cycle)    naloxone (NARCAN) 4 mg/actuation nasal spray Use 1 spray intranasally, then discard. Repeat with new spray every 2 min as needed for opioid overdose symptoms, alternating nostrils.  acetaminophen/diphenhydramine (TYLENOL PM PO) Take  by mouth.  LORazepam (ATIVAN) 0.5 mg tablet Take 0.5 mg by mouth every eight (8) hours as needed.  aspirin delayed-release (ASPIR-81) 81 mg tablet Take 1 Tab by mouth daily. No current facility-administered medications for this visit. No Known Allergies     Review of Systems: A complete review of systems was obtained, negative except as described above. Physical Exam:     Visit Vitals  /75   Pulse (!) 102   Temp 98.2 °F (36.8 °C)   Ht 5' 7\" (1.702 m)   Wt 152 lb 12.8 oz (69.3 kg)   SpO2 100%   BMI 23.93 kg/m²     ECOG PS: 0  General: Well developed, no acute distress  Eyes: PERRLA, EOMI, anicteric sclerae  HENT: Atraumatic, OP clear, TMs intact without erythema  Neck: Supple  Lymphatic: No supraclavicular, axillary. R cervical 2cm LN noted. Bilateral small 2-3cm palpable inguinal adenopathy  Respiratory: CTAB, normal respiratory effort  CV: Normal rate, regular rhythm, no murmurs, no peripheral edema  GI: Soft, nontender, nondistended, no masses, no hepatomegaly, no splenomegaly  MS: Normal gait and station. Digits without clubbing or cyanosis. Skin: No rashes, ecchymoses, or petechiae. Normal temperature, turgor, and texture. Neuro/Psych: Alert, oriented. 5/5 strength in all 4 extremities. Appropriate affect, normal judgment/insight.     Results:     Lab Results   Component Value Date/Time    WBC 10.0 12/27/2018 08:19 AM    HGB 13.6 12/27/2018 08:19 AM    HCT 41.3 12/27/2018 08:19 AM    PLATELET 013 00/09/4100 08:19 AM    MCV 91.2 2018 08:19 AM    ABS. NEUTROPHILS 6.6 2018 08:19 AM    Hemoglobin (POC) 15.0 2009 02:13 PM    Hematocrit (POC) 44 2009 02:13 PM     Lab Results   Component Value Date/Time    Sodium 137 2018 08:19 AM    Potassium 4.3 2018 08:19 AM    Chloride 101 2018 08:19 AM    CO2 28 2018 08:19 AM    Glucose 90 2018 08:19 AM    BUN 19 2018 08:19 AM    Creatinine 1.15 2018 08:19 AM    GFR est AA >60 2018 08:19 AM    GFR est non-AA >60 2018 08:19 AM    Calcium 9.6 2018 08:19 AM    Sodium (POC) 139 2009 02:13 PM    Potassium (POC) 3.9 2009 02:13 PM    Chloride (POC) 103 2009 02:13 PM    Glucose (POC) 98 2009 02:13 PM    BUN (POC) 9 2009 02:13 PM    Creatinine (POC) 1.0 2009 02:13 PM    Calcium, ionized (POC) 1.21 2009 02:13 PM     Lab Results   Component Value Date/Time    Bilirubin, total 0.2 2018 08:19 AM    ALT (SGPT) 15 2018 08:19 AM    AST (SGOT) 11 (L) 2018 08:19 AM    Alk. phosphatase 99 2018 08:19 AM    Protein, total 7.1 2018 08:19 AM    Albumin 3.6 2018 08:19 AM    Globulin 3.5 2018 08:19 AM     No results found for: IRON, FE, TIBC, IBCT, PSAT, FERR    No results found for: B12LT, FOL, RBCF  Lab Results   Component Value Date/Time    TSH 1.53 2016 04:40 AM     18:     Lab Results   Component Value Date/Time    Hepatitis A, IgM NONREACTIVE 2018 04:53 PM    Hepatitis B surface Ag <0.10 2018 04:53 PM    Hepatitis B core, IgM NONREACTIVE 2018 04:53 PM         Imagin/9/18 Abd/pelvis CT: IMPRESSION:  1. Interval development of a large retroperitoneal mass encircling the aorta with invasion of the left renal hilum and left adrenal gland. Several adjacent lymph nodes are seen extending into the peritoneum and underneath the  diaphragmatic natalie. This most likely represents lymphoma.   2. Several new bilateral enlarged inguinal lymph nodes also likely representing lymphoma. 3. Moderate left hydronephrosis with a delayed renal nephrogram related to decreased renal function. This is related to the invasion of the renal hilum. 11/11/18 Chest CT: IMPRESSION:  Trace left pleural effusion. Bilateral lower lobe atelectasis. Large  retroperitoneal mass lesion again demonstrated. PET 12/18/18:  FINDINGS:     HEAD/NECK: Right palatine tonsil intense hypermetabolism, max SUV 18. Multilevel  bilateral cervical adenopathy, with max SUV 12 in a left supraclavicular node. Cerebral evaluation is limited by normal intense activity.     CHEST: Solitary hypermetabolic left axillary node, max SUV 11.     ABDOMEN/PELVIS: Bulky retroperitoneal mass max SUV 27, with several additional  small active abdomino-pelvic nodes. Bilateral inguinal nodes with max SUV 12 on  the left.     SKELETON: No foci of abnormal hypermetabolism in the axial and visualized  appendicular skeleton.     IMPRESSION  IMPRESSION:   1. Right palatine tonsil tumor involvement (Deauville 5). 2. Bilateral cervical delaney involvement (Deauville 5). 3. Left axillary node involvement (Deauville 5). 4. Bulky retroperitoneal lymphoma mass and additional smaller hypermetabolic  abdomino-pelvic nodes (Deauville 5). 5. Bilateral inguinal delaney involvement (Deauville 5).    Deauville Five Point Scale  1. No uptake or no residual uptake (when used interim)  2. Slight uptake, but below blood pool (mediastinum)  3. Uptake above mediastinal, but below/equal to uptake in the liver  4. Uptake slightly to moderately higher than liver  5. Markedly increased uptake or any new lesion (on response evaluation)     Each FDG-avid (or previously FDG avid) lesion is rated independently.     Reference values:  Mediastinal blood pool: 2.1 SUV  Liver (background): 2.2 SUV    Assessment & Plan:   Jac Duverney is a 41 y.o. male comes in for evaluation and management of lymphoma.     1. Follicular lymphoma: Grade 3a. Although grade 3a disease is considered more indolent and can be treated like grade 1/2 disease, this patient has indications for treatment: bulky disease encircling the aorta causing symptoms. BR better than RCHOP, but based on GALLIUM study, Obinutuzumab-based induction and maintenance prolongs PFS over that seen with rituximab-based therapy. Therefore, I have chosen to treat patient with O-CHOP regimen for 6 cycles, followed by possible maintenance Obinutuzumab. We discussed the risks and benefits of O-CHOP chemotherapy. The potential side effects include, but are not limited to: nausea, vomiting, diarrhea, constipation, Flu-like symptoms, infusion reaction, allergic reaction, flushing, taste changes, increased risk of infection, anemia, fatigue, alopecia, neuropathy, nail changes, cardiac damage, mucositis, myleosuppression, infertility and rarely, death. Patient completed chemoteaching and received a chemotherapy education folder. -- f/u bone marrow biopsy results  -- Referred to Cardio-Oncology  -- f/u Hep B/C profiles, HIV, uric acid, B2M.  -- Proceed with cycle 1 O-CHOP regimen today and return in 1 week for day 8 Obinutuzumab. Will see weekly during cycle 1.  -- Prior to cycle 2, will remind patient about Prednisone 100mg PO daily on days 1-5.    2. Use of cardiotoxic chemotherapy: Discussed risks/benefits of using doxorubicin. Echo on 12/17/18 showed EF 65%. -- Referred to Cardiology    3. Neoplasm related pain / Anxiety: Left lower back pain. Taking Oxycodone 5mg bid prn. Signed pain contract on 12/28/18. -- Continue Oxycodone 5mg every 6hrs prn for pain, 60 tabs given and stool softners  -- Ativan 0.5mg bid prn, 60 tabs written 12/28/18    4. HTN / Hyperlipidemia: Uncontrolled as pt has been out of his meds for 1 weeks. -- On Lisinopril, HCTZ, Lipitor. 5. Tobacco abuse: Counseled on risk of smoking, benefits of quitting. Discussed cessation strategies.      Emotional well being: Pt is coping well with his/her disease and has excellent support. Met with SW.    I appreciate the opportunity to participate in Mr. Carmelo cabezas.     Signed By: Lawrence Mcgill MD     December 28, 2018

## 2018-12-28 NOTE — PROGRESS NOTES
[employer]  Social Work Navigator Encounter     Patient Name:  Jessica Rose    Medical History: follicular lymphoma    Narrative: Patient her for treatment. [facility] for [facility] support. Patient's aunt present. Patient reports to be actively coping with diagnosis. Much of his concern are financial. Discussed resources and patient has applied for Social Security Disability, Medicaid, and the [facility]. Provided him with coupons and resources to help lower the cost of prescriptions. 1 [facility] is assisting patient apply for financial assistance for infusion drugs. Barriers to Care: financial/ uninsured    Plan:   1. This MSW will contact Disability Determination Services regarding disability application. 2. [employer] is assisting with Care Card sophia process. 3. 1 [facility] is assisting patient apply for financial assistance for infusion drugs. 4. Encouraged patient to contact [employer] to check on Medicaid sophia  5.  Patient provided with rx coupons     -MARLENA Jones

## 2018-12-28 NOTE — PROGRESS NOTES
Outpatient Infusion Center - Chemotherapy Progress Note    0815 Pt admit to [facility] for C 1 d 2 o-chop ambulatory in stable condition. Assessment completed. No new concerns voiced. PAC with positive blood return. Chemotherapy Flowsheet 12/28/2018   Cycle C 1 D 2   Date 12/28/2018   Drug / Regimen O-Chop   Notes CHOP only       Visit Vitals  BP (P) 127/75   Pulse (!) (P) 102   Temp (P) 98.2 °F (36.8 °C)   Resp (P) 18   Ht (P) 5' 7\" (1.702 m)   Wt (P) 69.3 kg (152 lb 12.8 oz)   SpO2 100%   BMI (P) 23.93 kg/m²     Echo 12/17/18 65 %  Labs obtained and patient proceeded to scheduled MD appointment. Medications:  Emend IV  Aloxi ivp  Dexamethasone PO    Doxirubicin ivp  Cytoxan iv  vincristin ivp    1300 Pt tolerated treatment well. PAC maintained positive blood return throughout treatment, flushed with positive blood return at conclusion and throughout treatment. D/c home ambulatory in no distress. Pt aware of next appointment scheduled for 1/3/19 @ 0900. Lab department unable to find the post troponin lab. Recent Results (from the past 12 hour(s))   TROPONIN I    Collection Time: 12/28/18  8:27 AM   Result Value Ref Range    Troponin-I, Qt. <0.05 <0.05 ng/mL   SAMPLES BEING HELD    Collection Time: 12/28/18  8:30 AM   Result Value Ref Range    SAMPLES BEING HELD LAV,PST     COMMENT        Add-on orders for these samples will be processed based on acceptable specimen integrity and analyte stability, which may vary by analyte.    HIV 1/2 AG/AB, 4TH GENERATION,W RFLX CONFIRM    Collection Time: 12/28/18 10:00 AM   Result Value Ref Range    HIV 1/2 Interpretation NONREACTIVE NR      HIV 1/2 result comment SEE NOTE     LD    Collection Time: 12/28/18 10:00 AM   Result Value Ref Range     85 - 241 U/L

## 2018-12-30 LAB — B2 MICROGLOB SERPL-MCNC: 2.5 MG/L (ref 0.6–2.4)

## 2019-01-02 ENCOUNTER — TELEPHONE (OUTPATIENT)
Dept: ONCOLOGY | Age: 44
End: 2019-01-02

## 2019-01-02 NOTE — TELEPHONE ENCOUNTER
[employer]  Social Work Navigator Encounter     Called and spoke with Bhakti Pavon with Disability Determination Services to inquire about patient's Social Security application. He tells me the application is not on file yet but could not of been reviewed yet bc of holidays. Advised to follow back up with him on 1/7.

## 2019-01-03 ENCOUNTER — DOCUMENTATION ONLY (OUTPATIENT)
Dept: ONCOLOGY | Age: 44
End: 2019-01-03

## 2019-01-03 ENCOUNTER — HOSPITAL ENCOUNTER (OUTPATIENT)
Dept: INFUSION THERAPY | Age: 44
Discharge: HOME | End: 2019-01-03
Payer: MEDICAID

## 2019-01-03 ENCOUNTER — OFFICE VISIT (OUTPATIENT)
Dept: ONCOLOGY | Age: 44
End: 2019-01-03

## 2019-01-03 ENCOUNTER — APPOINTMENT (OUTPATIENT)
Dept: INFUSION THERAPY | Age: 44
End: 2019-01-03
Payer: MEDICAID

## 2019-01-03 VITALS
SYSTOLIC BLOOD PRESSURE: 134 MMHG | HEART RATE: 94 BPM | DIASTOLIC BLOOD PRESSURE: 90 MMHG | WEIGHT: 145.8 LBS | TEMPERATURE: 98.1 F | OXYGEN SATURATION: 97 % | BODY MASS INDEX: 22.88 KG/M2 | HEIGHT: 67 IN

## 2019-01-03 VITALS
DIASTOLIC BLOOD PRESSURE: 73 MMHG | RESPIRATION RATE: 18 BRPM | TEMPERATURE: 98.6 F | HEIGHT: 67 IN | SYSTOLIC BLOOD PRESSURE: 110 MMHG | WEIGHT: 145.8 LBS | BODY MASS INDEX: 22.88 KG/M2 | HEART RATE: 73 BPM

## 2019-01-03 DIAGNOSIS — K59.03 DRUG-INDUCED CONSTIPATION: ICD-10-CM

## 2019-01-03 DIAGNOSIS — Z51.11 CHEMOTHERAPY MANAGEMENT, ENCOUNTER FOR: ICD-10-CM

## 2019-01-03 DIAGNOSIS — E78.5 HYPERLIPIDEMIA, UNSPECIFIED HYPERLIPIDEMIA TYPE: ICD-10-CM

## 2019-01-03 DIAGNOSIS — I10 HYPERTENSION, UNSPECIFIED TYPE: ICD-10-CM

## 2019-01-03 DIAGNOSIS — G89.3 ACUTE NEOPLASM-RELATED PAIN: ICD-10-CM

## 2019-01-03 DIAGNOSIS — Z79.899 ENCOUNTER FOR MONITORING CARDIOTOXIC DRUG THERAPY: ICD-10-CM

## 2019-01-03 DIAGNOSIS — Z51.81 ENCOUNTER FOR MONITORING CARDIOTOXIC DRUG THERAPY: ICD-10-CM

## 2019-01-03 DIAGNOSIS — C82.90 FOLLICULAR LYMPHOMA, UNSPECIFIED FOLLICULAR LYMPHOMA TYPE, UNSPECIFIED BODY REGION (HCC): Primary | ICD-10-CM

## 2019-01-03 DIAGNOSIS — R11.0 NAUSEA: ICD-10-CM

## 2019-01-03 DIAGNOSIS — F41.9 ANXIETY: ICD-10-CM

## 2019-01-03 DIAGNOSIS — C82.33 FOLLICULAR LYMPHOMA GRADE IIIA OF INTRA-ABDOMINAL LYMPH NODES (HCC): Primary | ICD-10-CM

## 2019-01-03 LAB
ANION GAP SERPL CALC-SCNC: 9 MMOL/L (ref 5–15)
BASOPHILS # BLD: 0 K/UL (ref 0–0.1)
BASOPHILS NFR BLD: 0 % (ref 0–1)
BUN SERPL-MCNC: 15 MG/DL (ref 6–20)
BUN/CREAT SERPL: 14 (ref 12–20)
CALCIUM SERPL-MCNC: 9.5 MG/DL (ref 8.5–10.1)
CHLORIDE SERPL-SCNC: 102 MMOL/L (ref 97–108)
CO2 SERPL-SCNC: 26 MMOL/L (ref 21–32)
CREAT SERPL-MCNC: 1.04 MG/DL (ref 0.7–1.3)
DIFFERENTIAL METHOD BLD: ABNORMAL
EOSINOPHIL # BLD: 0.1 K/UL (ref 0–0.4)
EOSINOPHIL NFR BLD: 2 % (ref 0–7)
ERYTHROCYTE [DISTWIDTH] IN BLOOD BY AUTOMATED COUNT: 13 % (ref 11.5–14.5)
GLUCOSE SERPL-MCNC: 129 MG/DL (ref 65–100)
HCT VFR BLD AUTO: 42.3 % (ref 36.6–50.3)
HGB BLD-MCNC: 14.7 G/DL (ref 12.1–17)
IMM GRANULOCYTES # BLD: 0 K/UL
IMM GRANULOCYTES NFR BLD AUTO: 0 %
LYMPHOCYTES # BLD: 1.4 K/UL (ref 0.8–3.5)
LYMPHOCYTES NFR BLD: 24 % (ref 12–49)
MCH RBC QN AUTO: 30.3 PG (ref 26–34)
MCHC RBC AUTO-ENTMCNC: 34.8 G/DL (ref 30–36.5)
MCV RBC AUTO: 87.2 FL (ref 80–99)
MONOCYTES # BLD: 0.1 K/UL (ref 0–1)
MONOCYTES NFR BLD: 2 % (ref 5–13)
NEUTS SEG # BLD: 4.4 K/UL (ref 1.8–8)
NEUTS SEG NFR BLD: 72 % (ref 32–75)
NRBC # BLD: 0 K/UL (ref 0–0.01)
NRBC BLD-RTO: 0 PER 100 WBC
PLATELET # BLD AUTO: 107 K/UL (ref 150–400)
PMV BLD AUTO: 10.8 FL (ref 8.9–12.9)
POTASSIUM SERPL-SCNC: 3.9 MMOL/L (ref 3.5–5.1)
RBC # BLD AUTO: 4.85 M/UL (ref 4.1–5.7)
RBC MORPH BLD: ABNORMAL
SODIUM SERPL-SCNC: 137 MMOL/L (ref 136–145)
WBC # BLD AUTO: 6 K/UL (ref 4.1–11.1)

## 2019-01-03 PROCEDURE — 85025 COMPLETE CBC W/AUTO DIFF WBC: CPT

## 2019-01-03 PROCEDURE — 96375 TX/PRO/DX INJ NEW DRUG ADDON: CPT

## 2019-01-03 PROCEDURE — 36415 COLL VENOUS BLD VENIPUNCTURE: CPT

## 2019-01-03 PROCEDURE — 80048 BASIC METABOLIC PNL TOTAL CA: CPT

## 2019-01-03 PROCEDURE — 74011250637 HC RX REV CODE- 250/637: Performed by: INTERNAL MEDICINE

## 2019-01-03 PROCEDURE — 74011250636 HC RX REV CODE- 250/636: Performed by: INTERNAL MEDICINE

## 2019-01-03 PROCEDURE — 96413 CHEMO IV INFUSION 1 HR: CPT

## 2019-01-03 PROCEDURE — 96415 CHEMO IV INFUSION ADDL HR: CPT

## 2019-01-03 PROCEDURE — 96361 HYDRATE IV INFUSION ADD-ON: CPT

## 2019-01-03 PROCEDURE — 77030012965 HC NDL HUBR BBMI -A

## 2019-01-03 RX ORDER — PROCHLORPERAZINE MALEATE 10 MG
10 TABLET ORAL
Qty: 45 TAB | Refills: 2 | Status: SHIPPED | OUTPATIENT
Start: 2019-01-03 | End: 2019-09-11

## 2019-01-03 RX ORDER — ONDANSETRON 2 MG/ML
8 INJECTION INTRAMUSCULAR; INTRAVENOUS AS NEEDED
Status: DISPENSED | OUTPATIENT
Start: 2019-01-03 | End: 2019-01-03

## 2019-01-03 RX ORDER — HEPARIN 100 UNIT/ML
300-500 SYRINGE INTRAVENOUS AS NEEDED
Status: ACTIVE | OUTPATIENT
Start: 2019-01-03 | End: 2019-01-03

## 2019-01-03 RX ORDER — DIPHENHYDRAMINE HYDROCHLORIDE 50 MG/ML
50 INJECTION, SOLUTION INTRAMUSCULAR; INTRAVENOUS AS NEEDED
Status: DISPENSED | OUTPATIENT
Start: 2019-01-03 | End: 2019-01-03

## 2019-01-03 RX ORDER — SODIUM CHLORIDE 9 MG/ML
10 INJECTION INTRAMUSCULAR; INTRAVENOUS; SUBCUTANEOUS AS NEEDED
Status: ACTIVE | OUTPATIENT
Start: 2019-01-03 | End: 2019-01-03

## 2019-01-03 RX ORDER — SODIUM CHLORIDE 9 MG/ML
100 INJECTION, SOLUTION INTRAVENOUS CONTINUOUS
Status: DISPENSED | OUTPATIENT
Start: 2019-01-03 | End: 2019-01-03

## 2019-01-03 RX ORDER — ACETAMINOPHEN 325 MG/1
650 TABLET ORAL ONCE
Status: COMPLETED | OUTPATIENT
Start: 2019-01-03 | End: 2019-01-03

## 2019-01-03 RX ORDER — SODIUM CHLORIDE 0.9 % (FLUSH) 0.9 %
10 SYRINGE (ML) INJECTION AS NEEDED
Status: ACTIVE | OUTPATIENT
Start: 2019-01-03 | End: 2019-01-03

## 2019-01-03 RX ORDER — DIPHENHYDRAMINE HYDROCHLORIDE 50 MG/ML
50 INJECTION, SOLUTION INTRAMUSCULAR; INTRAVENOUS ONCE
Status: COMPLETED | OUTPATIENT
Start: 2019-01-03 | End: 2019-01-03

## 2019-01-03 RX ORDER — SODIUM CHLORIDE 9 MG/ML
25 INJECTION, SOLUTION INTRAVENOUS CONTINUOUS
Status: DISPENSED | OUTPATIENT
Start: 2019-01-03 | End: 2019-01-03

## 2019-01-03 RX ADMIN — OBINUTUZUMAB 1000 MG: 1000 INJECTION, SOLUTION, CONCENTRATE INTRAVENOUS at 11:22

## 2019-01-03 RX ADMIN — Medication 10 ML: at 15:40

## 2019-01-03 RX ADMIN — SODIUM CHLORIDE 100 ML/HR: 900 INJECTION, SOLUTION INTRAVENOUS at 11:23

## 2019-01-03 RX ADMIN — SODIUM CHLORIDE 500 ML: 900 INJECTION, SOLUTION INTRAVENOUS at 10:20

## 2019-01-03 RX ADMIN — DIPHENHYDRAMINE HYDROCHLORIDE 50 MG: 50 INJECTION INTRAMUSCULAR; INTRAVENOUS at 10:29

## 2019-01-03 RX ADMIN — Medication 500 UNITS: at 15:39

## 2019-01-03 RX ADMIN — ACETAMINOPHEN 650 MG: 325 TABLET ORAL at 10:29

## 2019-01-03 NOTE — PROGRESS NOTES
[id number] [facility]  [phone]    Hematology / Oncology Established Visit    Reason for Visit:   Shila Rosales is a 41 y.o. male who comes in for f/u of lymphoma. Hematology Oncology Treatment History:     Diagnosis: Follicular lymphoma    Stage: IV    Pathology:   11/13/18 right inguinal LN excision: Follicular lymphoma, high-grade (grade 3a of 3). Comment   The delaney architecture is entirely effaced by atypical lymphocytic proliferation with prominent nodular growth pattern. The majority of the atypical lymphocytes are small to medium in size and have irregular nuclear outlines and inconspicuous nucleoli, morphologically consistent with centrocytes. Scattered large lymphocytes with prominent nucleoli, consistent with centroblasts are identified (> 15 per high-power field). Focal increase in mitotic figures is noted. Occasional follicular dendritic cells are seen. By immunohistochemistry, the atypical lymphocytes are positive for CD20, PAX5, CD10, BCL6 and BCL2 (weak, focal) and negative for MUM1. CD3, CD5 and CD43 stain numerous background T lymphocytes. Ki-67 reveals a high proliferation index in the neoplastic follicles (overall 17-38%). Clinical history indicates 14 cm retroperitoneal mass with bilateral inguinal lymphadenopathy. In summary, the combined morphologic and phenotypic findings are diagnostic of a high-grade follicular lymphoma (grade 3a of 3). There is no evidence of diffuse large B-cell lymphoma. Flow cytometry analysis:   Monoclonal B-cell population (47 % of all cells) with mild increase in side and forward scatter properties expressing CD19, CD20, CD23 and CD10 with surface lambda light chain restriction. No phenotypically aberrant T-cell population. Flow cytometry was performed at [facility]     Prior Treatment: None    Current Treatment: Obinutuzumab-CHOP.  Obinutuzumab: 1000 mg weekly on days 1, 8, 15 for cycle 1, then 1000 mg on day 1 q21 days for cycles 2-6, then monotherapy 1000 mg every 21 days for cycle 7, 8 with Cyclophosphamide 750mg/m2, Doxorubicin 50mg/m2, Vincristine 1.4mg/m2 on day 1 and Prednisone 100mg on Days 1-5, every 21 days for a total of 6 cycles. History of Present Illness:   Mr. Zapata is a 40 y/o male with HTN who comes in for evaluation of Follicular lymphoma. He states he was in his usual state of health in early November 2018, but then he developed low back pain and presented to the ER. The pain was described as constant, radiating to the buttocks, without exacerbating or alleviating factors, associated w/ increased urination, no n/v/d, no dysuria, no fever, he's unsure about weight loss. Work-up at outside hospital revealed a retroperitoneal mass seen on CT imaging, and he was transferred to [facility] for further work-up. CT there showed a large retroperitoneal mass encircling the aorta with invasion of the left renal hilum and left adrenal gland. There were bilateral inguinal lymph nodes and moderate left hydronephrosis. He was evaluated while at [facility] and was noted to have palpable nodes in his groin for approximately the past 1 month. No testicular mass, anorexia, weight loss, fevers, night sweats, chest pain, shortness of breath, abdominal pain or nausea. He underwent excisional LN biopsy of right inguinal LN. He was told that he had likely lymphoma. He was advised to follow up as outpatient for PET scan, bone marrow biopsy. However, patient states he was lost to f/u. He never received any calls or appointment details. His aunt urged patient to seek care elsewhere and his PCP recommended [facility]. At our first meeting on 12/13/18, he tells me that he was working full time, feeling well until early Nov 2018. When he developed the left lower back pain, he went to [facility] and [facility] [place]. No fevers, chills, night sweats. He has lost 15 lbs in the past month due to low appetite. No n/v/d.  No current constipation. No CP, SOB. No h/o cardiac disease aside from HTN, Hyperlipiemia. No hematochezia/melena, hematuria. He has HTN and XOL, which he was taking meds for, but has run out. Has no PCP currently. He is uninsured. He works as a cook, currently working part time. He has children aged 12 and 13. Interval history: Today, he comes in for follow up. Today is cycle 1, day 8 of O-CHOP. Patient reports nausea since Saturday 12/29/18. He is taking the Zofran every 6 hours with no relief of nausea, reports vomiting 2 x in the last week. He is still able to eat and drink, although he has had a 7 pound weight loss since last week. He is supplementing with boost 1 x per day. Denies diarrhea, reports constipation. He is stooling every 3 days. He is taking docusate every other day. No recent fevers, chills, rash, hives, SOB or rigors. He is currently taking his oxycodone 5 mg BID for ongoing back and abdominal pain. His ativan prescription was refilled at the last visit and he continues to take three times a day which adequately controls his anxiety. FAMILY HISTORY:  His father has a history of leukemia, currently in remission, treated at [facility]. LYMPHATICS:  Bilateral palpable inguinal adenopathy. Past Medical History:   Diagnosis Date    Anxiety     Hyperlipidemia     Hypertension     Lymphadenopathy 11/12/2018      No past surgical history on file. Social History     Tobacco Use    Smoking status: Current Every Day Smoker     Packs/day: 1.00     Years: 20.00     Pack years: 20.00    Smokeless tobacco: Never Used   Substance Use Topics    Alcohol use: No     Frequency: Never      Family History   Problem Relation Age of Onset    Hypertension Father     Diabetes Mother      Current Outpatient Medications   Medication Sig    LORazepam (ATIVAN) 0.5 mg tablet Take 1 Tab by mouth every eight (8) hours as needed.  Max Daily Amount: 1.5 mg.    atorvastatin (LIPITOR) 10 mg tablet Take 1 Tab by mouth daily.    hydroCHLOROthiazide (HYDRODIURIL) 12.5 mg tablet Take 1 Tab by mouth daily.  lisinopril (PRINIVIL, ZESTRIL) 10 mg tablet Take 1 Tab by mouth daily.  oxyCODONE (OXYIR) 5 mg capsule Take 1 Cap by mouth every six (6) hours as needed. Max Daily Amount: 20 mg.    senna-docusate (PERICOLACE) 8.6-50 mg per tablet Take 1 Tab by mouth daily.  ondansetron hcl (ZOFRAN) 8 mg tablet Take 1 Tab by mouth every eight (8) hours as needed for Nausea.  predniSONE (DELTASONE) 20 mg tablet Take 100 mg by mouth daily (with breakfast). (on days 1-5 of each chemo cycle)    naloxone (NARCAN) 4 mg/actuation nasal spray Use 1 spray intranasally, then discard. Repeat with new spray every 2 min as needed for opioid overdose symptoms, alternating nostrils.  acetaminophen/diphenhydramine (TYLENOL PM PO) Take  by mouth.  aspirin delayed-release (ASPIR-81) 81 mg tablet Take 1 Tab by mouth daily. No current facility-administered medications for this visit. No Known Allergies     Review of Systems: A complete review of systems was obtained, negative except as described above. Physical Exam:     There were no vitals taken for this visit. ECOG PS: 0  General: Well developed, no acute distress  Eyes: PERRLA, EOMI, anicteric sclerae  HENT: Atraumatic, OP clear, TMs intact without erythema  Neck: Supple  Lymphatic: No supraclavicular, axillary. R cervical 2cm LN noted. Bilateral small 2-3cm palpable inguinal adenopathy  Respiratory: CTAB, normal respiratory effort  CV: Normal rate, regular rhythm, no murmurs, no peripheral edema  GI: Soft, nontender, nondistended, no masses, no hepatomegaly, no splenomegaly  MS: Normal gait and station. Digits without clubbing or cyanosis. Skin: No rashes, ecchymoses, or petechiae. Normal temperature, turgor, and texture. Neuro/Psych: Alert, oriented. 5/5 strength in all 4 extremities. Appropriate affect, normal judgment/insight.     Results:     Lab Results   Component Value Date/Time    WBC 6.0 2019 09:03 AM    HGB 14.7 2019 09:03 AM    HCT 42.3 2019 09:03 AM    PLATELET 149 (L)  09:03 AM    MCV 87.2 2019 09:03 AM    ABS. NEUTROPHILS 4.4 2019 09:03 AM    Hemoglobin (POC) 15.0 2009 02:13 PM    Hematocrit (POC) 44 2009 02:13 PM     Lab Results   Component Value Date/Time    Sodium 137 2019 09:03 AM    Potassium 3.9 2019 09:03 AM    Chloride 102 2019 09:03 AM    CO2 26 2019 09:03 AM    Glucose 129 (H) 2019 09:03 AM    BUN 15 2019 09:03 AM    Creatinine 1.04 2019 09:03 AM    GFR est AA >60 2019 09:03 AM    GFR est non-AA >60 2019 09:03 AM    Calcium 9.5 2019 09:03 AM    Sodium (POC) 139 2009 02:13 PM    Potassium (POC) 3.9 2009 02:13 PM    Chloride (POC) 103 2009 02:13 PM    Glucose (POC) 98 2009 02:13 PM    BUN (POC) 9 2009 02:13 PM    Creatinine (POC) 1.0 2009 02:13 PM    Calcium, ionized (POC) 1.21 2009 02:13 PM     Lab Results   Component Value Date/Time    Bilirubin, total 0.2 2018 08:19 AM    ALT (SGPT) 15 2018 08:19 AM    AST (SGOT) 11 (L) 2018 08:19 AM    Alk. phosphatase 99 2018 08:19 AM    Protein, total 7.1 2018 08:19 AM    Albumin 3.6 2018 08:19 AM    Globulin 3.5 2018 08:19 AM     No results found for: IRON, FE, TIBC, IBCT, PSAT, FERR    No results found for: B12LT, FOL, RBCF  Lab Results   Component Value Date/Time    TSH 1.53 2016 04:40 AM     18:     Lab Results   Component Value Date/Time    Hepatitis A, IgM NONREACTIVE 2018 04:53 PM    Hepatitis B surface Ag <0.10 2018 04:53 PM    Hepatitis B core, IgM NONREACTIVE 2018 04:53 PM         Imagin/9/18 Abd/pelvis CT: IMPRESSION:  1. Interval development of a large retroperitoneal mass encircling the aorta with invasion of the left renal hilum and left adrenal gland.  Several adjacent lymph nodes are seen extending into the peritoneum and underneath the  diaphragmatic natalie. This most likely represents lymphoma. 2. Several new bilateral enlarged inguinal lymph nodes also likely representing lymphoma. 3. Moderate left hydronephrosis with a delayed renal nephrogram related to decreased renal function. This is related to the invasion of the renal hilum. 11/11/18 Chest CT: IMPRESSION:  Trace left pleural effusion. Bilateral lower lobe atelectasis. Large  retroperitoneal mass lesion again demonstrated. PET 12/18/18:  FINDINGS:  HEAD/NECK: Right palatine tonsil intense hypermetabolism, max SUV 18. Multilevel  bilateral cervical adenopathy, with max SUV 12 in a left supraclavicular node. Cerebral evaluation is limited by normal intense activity. CHEST: Solitary hypermetabolic left axillary node, max SUV 11. ABDOMEN/PELVIS: Bulky retroperitoneal mass max SUV 27, with several additional  small active abdomino-pelvic nodes. Bilateral inguinal nodes with max SUV 12 on  the left. SKELETON: No foci of abnormal hypermetabolism in the axial and visualized  appendicular skeleton. IMPRESSION:   1. Right palatine tonsil tumor involvement (Deauville 5). 2. Bilateral cervical delaney involvement (Deauville 5). 3. Left axillary node involvement (Deauville 5). 4. Bulky retroperitoneal lymphoma mass and additional smaller hypermetabolic  abdomino-pelvic nodes (Deauville 5). 5. Bilateral inguinal delaney involvement (Deauville 5). Deauville Five Point Scale  1. No uptake or no residual uptake (when used interim)  2. Slight uptake, but below blood pool (mediastinum)  3. Uptake above mediastinal, but below/equal to uptake in the liver  4. Uptake slightly to moderately higher than liver  5. Markedly increased uptake or any new lesion (on response evaluation)  Each FDG-avid (or previously FDG avid) lesion is rated independently.   Reference values:  Mediastinal blood pool: 2.1 SUV  Liver (background): 2.2 SUV    Assessment & Plan:   Marlo Mackay is a 41 y.o. male comes in for evaluation and management of lymphoma. 1. Follicular lymphoma: Grade 3a. Although grade 3a disease is considered more indolent and can be treated like grade 1/2 disease, this patient has indications for treatment: bulky disease encircling the aorta causing symptoms. Bone marrow negative for lymphoma, but was hypercellular. BR better than RCHOP, but based on GALLIUM study, Obinutuzumab-based induction and maintenance prolongs PFS over that seen with rituximab-based therapy. Therefore, I have chosen to treat patient with O-CHOP regimen for 6 cycles, followed by possible maintenance Obinutuzumab. We discussed the risks and benefits of O-CHOP chemotherapy. The potential side effects include, but are not limited to: nausea, vomiting, diarrhea, constipation, Flu-like symptoms, infusion reaction, allergic reaction, flushing, taste changes, increased risk of infection, anemia, fatigue, alopecia, neuropathy, nail changes, cardiac damage, mucositis, myleosuppression, infertility and rarely, death. Patient completed chemoteaching and received a chemotherapy education folder. -- Referred to Cardio-Oncology  -- Proceed with cycle 1,  Day 8 of O-CHOP regimen today and return in 1 week for day 15 O-CHOP. Will see weekly during cycle 1.   -- Prior to cycle 2, will remind patient about Prednisone 100mg PO daily on days 1-5.    2. Use of cardiotoxic chemotherapy: Discussed risks/benefits of using doxorubicin. Echo on 12/17/18 showed EF 65%. -- Referred to Cardiology    3. Neoplasm related pain / Anxiety: Left lower back pain. Taking Oxycodone 5mg bid prn. Signed pain contract on 12/28/18. Counseled on adding SSRI if anxiety becomes worse. -- Continue Oxycodone 5mg every 6hrs prn for pain, 60 tabs given and stool softners  -- Ativan 0.5mg bid prn, 60 tabs written 12/28/18    4.  HTN / Hyperlipidemia: Uncontrolled as pt has been out of his meds for 1 weeks.  -- On Lisinopril, HCTZ, Lipitor. 5. Tobacco abuse: Counseled on risk of smoking, benefits of quitting. Discussed cessation strategies. Still smoking a pack per day. 6. Nausea: Taking Zofran every 6 hours with minimal relief of nausea. -- Add compazine     7. Constipation: BM every 3 days. Only taking docusate every other day. Counseled to start taking daily and add a laxative if the constipation persists after increasing docusate. Emotional well being: Pt is coping well with his/her disease and has excellent support. Met with SW.    I appreciate the opportunity to participate in Mr. Ladi Pérez care.     Signed By: Rene Brush MD     January 3, 2019

## 2019-01-03 NOTE — PROGRESS NOTES
[address]  Social Work Navigator Encounter     Patient Name:  Ning Catalan    Medical History: lymphoma    Narrative: Met with patient in clinic to offer support. Patient here with his father. Patient advised he was denied Medicaid because he is over income. Advised patient contact the worker who denied his claim or a supervisor with DSS to update his income as his income has significantly reduced as he is not working at this time. Suggested patient update release with them if he needs additional support from me. Patient voiced understanding. Ish Lindsay is assisting with Care Card application. Barriers to Care: financial, uninsured    Plan:   1. Follow-up with Medicaid to inquire about his denied application. Patient will contact this MSW if additional support is needed. 2. Keller Cranker assisting with Care Card application.     Thank you,  MARLENA Davila

## 2019-01-03 NOTE — PROGRESS NOTES
[facility] - Chemotherapy Progress Note    0900 Pt admit to [facility] for O-Chop C1D8 ambulatory in stable condition. Assessment completed. No new concerns voiced. Port accessed with positive blood return. Labs drawn per order and sent. Line flushed, clamped, Curos Cap applied to end clave. Pt to MD for appointment  Labs reviewed, chemo ordered. Patient Vitals for the past 12 hrs:   Temp Pulse Resp BP   01/03/19 1540 98.6 °F (37 °C) 73 18 110/73   01/03/19 1522 98.2 °F (36.8 °C) 77 18 107/73   01/03/19 1452 98.3 °F (36.8 °C) 76 18 105/70   01/03/19 1422 98.5 °F (36.9 °C) 80 18 106/72   01/03/19 1352 98.4 °F (36.9 °C) 79 18 108/71   01/03/19 1322 98.2 °F (36.8 °C) 76 18 104/69   01/03/19 1252 98.7 °F (37.1 °C) 77 18 113/70   01/03/19 1222 98.2 °F (36.8 °C) 93 18 115/74   01/03/19 1152 97.9 °F (36.6 °C) 74 18 106/72   01/03/19 0900 98.1 °F (36.7 °C) 94 18 134/90         Medications:   mL bolus over 1 hr  NS @ 100 mL/hr  Tylenol po  Benadryl  Gazyva    1545 Pt tolerated treatment well. Port maintained positive blood return throughout treatment, flushed with positive blood return at conclusion and heparinized and de-accessed. D/c home ambulatory in no distress. Pt aware of next [facility] appointment scheduled for 1/10/19. Recent Results (from the past 12 hour(s))   CBC WITH AUTOMATED DIFF    Collection Time: 01/03/19  9:03 AM   Result Value Ref Range    WBC 6.0 4.1 - 11.1 K/uL    RBC 4.85 4.10 - 5.70 M/uL    HGB 14.7 12.1 - 17.0 g/dL    HCT 42.3 36.6 - 50.3 %    MCV 87.2 80.0 - 99.0 FL    MCH 30.3 26.0 - 34.0 PG    MCHC 34.8 30.0 - 36.5 g/dL    RDW 13.0 11.5 - 14.5 %    PLATELET 913 (L) 582 - 400 K/uL    MPV 10.8 8.9 - 12.9 FL    NRBC 0.0 0  WBC    ABSOLUTE NRBC 0.00 0.00 - 0.01 K/uL    NEUTROPHILS 72 32 - 75 %    LYMPHOCYTES 24 12 - 49 %    MONOCYTES 2 (L) 5 - 13 %    EOSINOPHILS 2 0 - 7 %    BASOPHILS 0 0 - 1 %    IMMATURE GRANULOCYTES 0 %    ABS. NEUTROPHILS 4.4 1.8 - 8.0 K/UL    ABS. LYMPHOCYTES 1.4 0.8 - 3.5 K/UL    ABS. MONOCYTES 0.1 0.0 - 1.0 K/UL    ABS. EOSINOPHILS 0.1 0.0 - 0.4 K/UL    ABS. BASOPHILS 0.0 0.0 - 0.1 K/UL    ABS. IMM.  GRANS. 0.0 K/UL    DF MANUAL      RBC COMMENTS NORMOCYTIC, NORMOCHROMIC     METABOLIC PANEL, BASIC    Collection Time: 01/03/19  9:03 AM   Result Value Ref Range    Sodium 137 136 - 145 mmol/L    Potassium 3.9 3.5 - 5.1 mmol/L    Chloride 102 97 - 108 mmol/L    CO2 26 21 - 32 mmol/L    Anion gap 9 5 - 15 mmol/L    Glucose 129 (H) 65 - 100 mg/dL    BUN 15 6 - 20 MG/DL    Creatinine 1.04 0.70 - 1.30 MG/DL    BUN/Creatinine ratio 14 12 - 20      GFR est AA >60 >60 ml/min/1.73m2    GFR est non-AA >60 >60 ml/min/1.73m2    Calcium 9.5 8.5 - 10.1 MG/DL

## 2019-01-07 ENCOUNTER — TELEPHONE (OUTPATIENT)
Dept: ONCOLOGY | Age: 44
End: 2019-01-07

## 2019-01-07 NOTE — TELEPHONE ENCOUNTER
[address]  Social Work Navigator Encounter       Call placed to patient to follow up on SSDI application. Informed patient that per Ashley Kelly with Disability Determination Services his application is not showing up in the system. Advised patient to contact Social Security to inquire if additional information is needed to move it along. Provided phone number. Patient voiced understanding a appreciation.

## 2019-01-08 ENCOUNTER — TELEPHONE (OUTPATIENT)
Dept: ONCOLOGY | Age: 44
End: 2019-01-08

## 2019-01-08 DIAGNOSIS — E78.5 HYPERLIPIDEMIA, UNSPECIFIED HYPERLIPIDEMIA TYPE: Primary | ICD-10-CM

## 2019-01-08 RX ORDER — SIMVASTATIN 20 MG/1
20 TABLET, FILM COATED ORAL
Qty: 30 TAB | Refills: 1 | Status: SHIPPED | OUTPATIENT
Start: 2019-01-08 | End: 2019-01-09

## 2019-01-08 NOTE — TELEPHONE ENCOUNTER
[facility] at [phone]    Patient called, concerned that he has a mouth infection. He has poor dentition and has had similar issues in years past with dental and gum infections. He reports redness and pain in his gums on one side. Denies white patches. No fevers. Ok with swallowing. Discussed that this may be mucositis secondary to chemotherapy, or may be infection. He would benefit from evaluation prior to starting an abx. I offered to call in magic mouthwash, but he wants to wait until he can be seen. I will notify his primary oncologist to see if he can be seen tomorrow.

## 2019-01-09 ENCOUNTER — TELEPHONE (OUTPATIENT)
Dept: ONCOLOGY | Age: 44
End: 2019-01-09

## 2019-01-09 ENCOUNTER — APPOINTMENT (OUTPATIENT)
Dept: GENERAL RADIOLOGY | Age: 44
DRG: 720 | End: 2019-01-09
Attending: NURSE PRACTITIONER
Payer: MEDICAID

## 2019-01-09 ENCOUNTER — OFFICE VISIT (OUTPATIENT)
Dept: ONCOLOGY | Age: 44
End: 2019-01-09

## 2019-01-09 ENCOUNTER — APPOINTMENT (OUTPATIENT)
Dept: CT IMAGING | Age: 44
DRG: 720 | End: 2019-01-09
Attending: INTERNAL MEDICINE
Payer: MEDICAID

## 2019-01-09 ENCOUNTER — HOSPITAL ENCOUNTER (INPATIENT)
Age: 44
LOS: 3 days | Discharge: HOME | DRG: 720 | End: 2019-01-12
Attending: EMERGENCY MEDICINE | Admitting: INTERNAL MEDICINE
Payer: MEDICAID

## 2019-01-09 ENCOUNTER — HOSPITAL ENCOUNTER (OUTPATIENT)
Dept: INFUSION THERAPY | Age: 44
Discharge: HOME | End: 2019-01-09
Payer: MEDICAID

## 2019-01-09 VITALS
SYSTOLIC BLOOD PRESSURE: 146 MMHG | HEIGHT: 67 IN | BODY MASS INDEX: 23.01 KG/M2 | TEMPERATURE: 99.4 F | OXYGEN SATURATION: 98 % | DIASTOLIC BLOOD PRESSURE: 95 MMHG | HEART RATE: 122 BPM | WEIGHT: 146.6 LBS

## 2019-01-09 VITALS
DIASTOLIC BLOOD PRESSURE: 91 MMHG | SYSTOLIC BLOOD PRESSURE: 141 MMHG | OXYGEN SATURATION: 98 % | RESPIRATION RATE: 18 BRPM | TEMPERATURE: 102 F | HEART RATE: 119 BPM

## 2019-01-09 DIAGNOSIS — D69.59 CHEMOTHERAPY-INDUCED THROMBOCYTOPENIA: ICD-10-CM

## 2019-01-09 DIAGNOSIS — K08.89 PAIN, DENTAL: ICD-10-CM

## 2019-01-09 DIAGNOSIS — T45.1X5A CHEMOTHERAPY-INDUCED THROMBOCYTOPENIA: ICD-10-CM

## 2019-01-09 DIAGNOSIS — T45.1X5A ANTINEOPLASTIC CHEMOTHERAPY INDUCED ANEMIA: ICD-10-CM

## 2019-01-09 DIAGNOSIS — D70.9 NEUTROPENIC FEVER (HCC): Primary | ICD-10-CM

## 2019-01-09 DIAGNOSIS — C82.53 DIFFUSE FOLLICLE CENTER LYMPHOMA OF INTRA-ABDOMINAL LYMPH NODES (HCC): ICD-10-CM

## 2019-01-09 DIAGNOSIS — C82.90 FOLLICULAR LYMPHOMA, UNSPECIFIED FOLLICULAR LYMPHOMA TYPE, UNSPECIFIED BODY REGION (HCC): Primary | ICD-10-CM

## 2019-01-09 DIAGNOSIS — D64.81 ANTINEOPLASTIC CHEMOTHERAPY INDUCED ANEMIA: ICD-10-CM

## 2019-01-09 DIAGNOSIS — I10 HYPERTENSION, UNSPECIFIED TYPE: ICD-10-CM

## 2019-01-09 DIAGNOSIS — K04.7 DENTAL ABSCESS: ICD-10-CM

## 2019-01-09 DIAGNOSIS — R50.81 NEUTROPENIC FEVER (HCC): Primary | ICD-10-CM

## 2019-01-09 DIAGNOSIS — C82.33 FOLLICULAR LYMPHOMA GRADE IIIA OF INTRA-ABDOMINAL LYMPH NODES (HCC): Primary | ICD-10-CM

## 2019-01-09 DIAGNOSIS — C82.33 FOLLICULAR LYMPHOMA GRADE IIIA OF INTRA-ABDOMINAL LYMPH NODES (HCC): ICD-10-CM

## 2019-01-09 DIAGNOSIS — G89.3 ACUTE NEOPLASM-RELATED PAIN: ICD-10-CM

## 2019-01-09 DIAGNOSIS — K04.7 DENTAL INFECTION: ICD-10-CM

## 2019-01-09 PROBLEM — F41.9 ANXIETY: Status: ACTIVE | Noted: 2019-01-09

## 2019-01-09 PROBLEM — A41.9 SEPSIS (HCC): Status: ACTIVE | Noted: 2019-01-09

## 2019-01-09 LAB
ALBUMIN SERPL-MCNC: 2.9 G/DL (ref 3.5–5)
ALBUMIN/GLOB SERPL: 0.7 {RATIO} (ref 1.1–2.2)
ALP SERPL-CCNC: 95 U/L (ref 45–117)
ALT SERPL-CCNC: 42 U/L (ref 12–78)
ANION GAP SERPL CALC-SCNC: 11 MMOL/L (ref 5–15)
APPEARANCE UR: CLEAR
AST SERPL-CCNC: 17 U/L (ref 15–37)
BACTERIA URNS QL MICRO: NEGATIVE /HPF
BASOPHILS # BLD: 0 K/UL (ref 0–0.1)
BASOPHILS NFR BLD: 4 % (ref 0–1)
BILIRUB SERPL-MCNC: 0.5 MG/DL (ref 0.2–1)
BILIRUB UR QL: NEGATIVE
BUN SERPL-MCNC: 7 MG/DL (ref 6–20)
BUN/CREAT SERPL: 6 (ref 12–20)
CALCIUM SERPL-MCNC: 8.8 MG/DL (ref 8.5–10.1)
CHLORIDE SERPL-SCNC: 98 MMOL/L (ref 97–108)
CO2 SERPL-SCNC: 23 MMOL/L (ref 21–32)
COLOR UR: ABNORMAL
COMMENT, HOLDF: NORMAL
CREAT SERPL-MCNC: 1.16 MG/DL (ref 0.7–1.3)
DIFFERENTIAL METHOD BLD: ABNORMAL
EOSINOPHIL # BLD: 0 K/UL (ref 0–0.4)
EOSINOPHIL NFR BLD: 0 % (ref 0–7)
EPITH CASTS URNS QL MICRO: ABNORMAL /LPF
ERYTHROCYTE [DISTWIDTH] IN BLOOD BY AUTOMATED COUNT: 12.7 % (ref 11.5–14.5)
GLOBULIN SER CALC-MCNC: 4.1 G/DL (ref 2–4)
GLUCOSE SERPL-MCNC: 136 MG/DL (ref 65–100)
GLUCOSE UR STRIP.AUTO-MCNC: NEGATIVE MG/DL
HCT VFR BLD AUTO: 33.3 % (ref 36.6–50.3)
HGB BLD-MCNC: 11.8 G/DL (ref 12.1–17)
HGB UR QL STRIP: NEGATIVE
HYALINE CASTS URNS QL MICRO: ABNORMAL /LPF (ref 0–5)
IMM GRANULOCYTES # BLD AUTO: 0 K/UL (ref 0–0.04)
IMM GRANULOCYTES NFR BLD AUTO: 0 % (ref 0–0.5)
KETONES UR QL STRIP.AUTO: NEGATIVE MG/DL
LACTATE BLD-SCNC: 0.58 MMOL/L (ref 0.4–2)
LEUKOCYTE ESTERASE UR QL STRIP.AUTO: NEGATIVE
LYMPHOCYTES # BLD: 0.8 K/UL (ref 0.8–3.5)
LYMPHOCYTES NFR BLD: 63 % (ref 12–49)
MCH RBC QN AUTO: 30.4 PG (ref 26–34)
MCHC RBC AUTO-ENTMCNC: 35.4 G/DL (ref 30–36.5)
MCV RBC AUTO: 85.8 FL (ref 80–99)
MONOCYTES # BLD: 0.3 K/UL (ref 0–1)
MONOCYTES NFR BLD: 29 % (ref 5–13)
NEUTS SEG # BLD: 0 K/UL (ref 1.8–8)
NEUTS SEG NFR BLD: 4 % (ref 32–75)
NITRITE UR QL STRIP.AUTO: NEGATIVE
NRBC # BLD: 0 K/UL (ref 0–0.01)
NRBC BLD-RTO: 0 PER 100 WBC
PATH REV BLD -IMP: ABNORMAL
PH UR STRIP: 7 [PH] (ref 5–8)
PLATELET # BLD AUTO: 99 K/UL (ref 150–400)
PLATELET COMMENTS,PCOM: ABNORMAL
PMV BLD AUTO: 10.5 FL (ref 8.9–12.9)
POTASSIUM SERPL-SCNC: 3.5 MMOL/L (ref 3.5–5.1)
PROT SERPL-MCNC: 7 G/DL (ref 6.4–8.2)
PROT UR STRIP-MCNC: 100 MG/DL
RBC # BLD AUTO: 3.88 M/UL (ref 4.1–5.7)
RBC #/AREA URNS HPF: ABNORMAL /HPF (ref 0–5)
RBC MORPH BLD: ABNORMAL
SAMPLES BEING HELD,HOLD: NORMAL
SODIUM SERPL-SCNC: 132 MMOL/L (ref 136–145)
SP GR UR REFRACTOMETRY: 1.01 (ref 1–1.03)
UR CULT HOLD, URHOLD: NORMAL
UROBILINOGEN UR QL STRIP.AUTO: >8 EU/DL (ref 0.2–1)
WBC # BLD AUTO: 1.1 K/UL (ref 4.1–11.1)
WBC URNS QL MICRO: ABNORMAL /HPF (ref 0–4)

## 2019-01-09 PROCEDURE — 74011250636 HC RX REV CODE- 250/636: Performed by: NURSE PRACTITIONER

## 2019-01-09 PROCEDURE — 77030012965 HC NDL HUBR BBMI -A

## 2019-01-09 PROCEDURE — 36415 COLL VENOUS BLD VENIPUNCTURE: CPT

## 2019-01-09 PROCEDURE — 83605 ASSAY OF LACTIC ACID: CPT

## 2019-01-09 PROCEDURE — 96360 HYDRATION IV INFUSION INIT: CPT

## 2019-01-09 PROCEDURE — 87040 BLOOD CULTURE FOR BACTERIA: CPT

## 2019-01-09 PROCEDURE — 96365 THER/PROPH/DIAG IV INF INIT: CPT

## 2019-01-09 PROCEDURE — 85025 COMPLETE CBC W/AUTO DIFF WBC: CPT

## 2019-01-09 PROCEDURE — 70486 CT MAXILLOFACIAL W/O DYE: CPT

## 2019-01-09 PROCEDURE — 74011250637 HC RX REV CODE- 250/637: Performed by: NURSE PRACTITIONER

## 2019-01-09 PROCEDURE — 74011000258 HC RX REV CODE- 258: Performed by: NURSE PRACTITIONER

## 2019-01-09 PROCEDURE — 74011250636 HC RX REV CODE- 250/636: Performed by: INTERNAL MEDICINE

## 2019-01-09 PROCEDURE — 81001 URINALYSIS AUTO W/SCOPE: CPT

## 2019-01-09 PROCEDURE — 80053 COMPREHEN METABOLIC PANEL: CPT

## 2019-01-09 PROCEDURE — 65270000029 HC RM PRIVATE

## 2019-01-09 PROCEDURE — 71046 X-RAY EXAM CHEST 2 VIEWS: CPT

## 2019-01-09 PROCEDURE — 99285 EMERGENCY DEPT VISIT HI MDM: CPT

## 2019-01-09 PROCEDURE — 74011000258 HC RX REV CODE- 258: Performed by: INTERNAL MEDICINE

## 2019-01-09 PROCEDURE — 74011250637 HC RX REV CODE- 250/637: Performed by: INTERNAL MEDICINE

## 2019-01-09 RX ORDER — AMOXICILLIN AND CLAVULANATE POTASSIUM 875; 125 MG/1; MG/1
1 TABLET, FILM COATED ORAL EVERY 12 HOURS
Qty: 14 TAB | Refills: 0 | Status: SHIPPED | OUTPATIENT
Start: 2019-01-09 | End: 2019-01-09

## 2019-01-09 RX ORDER — ATORVASTATIN CALCIUM 10 MG/1
10 TABLET, FILM COATED ORAL
COMMUNITY
End: 2019-02-17

## 2019-01-09 RX ORDER — SODIUM CHLORIDE 9 MG/ML
100 INJECTION, SOLUTION INTRAVENOUS CONTINUOUS
Status: DISCONTINUED | OUTPATIENT
Start: 2019-01-09 | End: 2019-01-11

## 2019-01-09 RX ORDER — LORAZEPAM 0.5 MG/1
0.5 TABLET ORAL
Status: DISCONTINUED | OUTPATIENT
Start: 2019-01-09 | End: 2019-01-12 | Stop reason: HOSPADM

## 2019-01-09 RX ORDER — OXYCODONE HYDROCHLORIDE 5 MG/1
5 TABLET ORAL
Status: DISCONTINUED | OUTPATIENT
Start: 2019-01-09 | End: 2019-01-11

## 2019-01-09 RX ORDER — SIMVASTATIN 20 MG/1
20 TABLET, FILM COATED ORAL
Status: DISCONTINUED | OUTPATIENT
Start: 2019-01-09 | End: 2019-01-09 | Stop reason: SDUPTHER

## 2019-01-09 RX ORDER — OXYCODONE HYDROCHLORIDE 5 MG/1
5 CAPSULE ORAL
Qty: 60 CAP | Refills: 0 | Status: SHIPPED | OUTPATIENT
Start: 2019-01-09 | End: 2019-01-09

## 2019-01-09 RX ORDER — HEPARIN SODIUM 5000 [USP'U]/ML
5000 INJECTION, SOLUTION INTRAVENOUS; SUBCUTANEOUS EVERY 8 HOURS
Status: DISCONTINUED | OUTPATIENT
Start: 2019-01-09 | End: 2019-01-12 | Stop reason: HOSPADM

## 2019-01-09 RX ORDER — OXYCODONE HYDROCHLORIDE 5 MG/1
5 TABLET ORAL
COMMUNITY
End: 2019-01-24 | Stop reason: SDUPTHER

## 2019-01-09 RX ORDER — AMOXICILLIN 250 MG
1 CAPSULE ORAL DAILY
Status: DISCONTINUED | OUTPATIENT
Start: 2019-01-10 | End: 2019-01-12 | Stop reason: HOSPADM

## 2019-01-09 RX ORDER — IBUPROFEN 200 MG
1 TABLET ORAL EVERY 24 HOURS
Status: DISCONTINUED | OUTPATIENT
Start: 2019-01-09 | End: 2019-01-12 | Stop reason: HOSPADM

## 2019-01-09 RX ORDER — ACETAMINOPHEN 325 MG/1
650 TABLET ORAL
Status: DISCONTINUED | OUTPATIENT
Start: 2019-01-09 | End: 2019-01-12 | Stop reason: HOSPADM

## 2019-01-09 RX ORDER — NALOXONE HYDROCHLORIDE 0.4 MG/ML
0.4 INJECTION, SOLUTION INTRAMUSCULAR; INTRAVENOUS; SUBCUTANEOUS AS NEEDED
Status: DISCONTINUED | OUTPATIENT
Start: 2019-01-09 | End: 2019-01-12 | Stop reason: HOSPADM

## 2019-01-09 RX ORDER — SODIUM CHLORIDE 0.9 % (FLUSH) 0.9 %
5-40 SYRINGE (ML) INJECTION AS NEEDED
Status: DISCONTINUED | OUTPATIENT
Start: 2019-01-09 | End: 2019-01-12 | Stop reason: HOSPADM

## 2019-01-09 RX ORDER — SODIUM CHLORIDE 0.9 % (FLUSH) 0.9 %
5-40 SYRINGE (ML) INJECTION EVERY 8 HOURS
Status: DISCONTINUED | OUTPATIENT
Start: 2019-01-09 | End: 2019-01-12 | Stop reason: HOSPADM

## 2019-01-09 RX ORDER — ATORVASTATIN CALCIUM 10 MG/1
10 TABLET, FILM COATED ORAL
Status: DISCONTINUED | OUTPATIENT
Start: 2019-01-09 | End: 2019-01-12 | Stop reason: HOSPADM

## 2019-01-09 RX ORDER — ACETAMINOPHEN 500 MG
1000 TABLET ORAL
Status: COMPLETED | OUTPATIENT
Start: 2019-01-09 | End: 2019-01-09

## 2019-01-09 RX ADMIN — Medication 10 ML: at 23:25

## 2019-01-09 RX ADMIN — ATORVASTATIN CALCIUM 10 MG: 10 TABLET, FILM COATED ORAL at 21:25

## 2019-01-09 RX ADMIN — VANCOMYCIN HYDROCHLORIDE 1750 MG: 10 INJECTION, POWDER, LYOPHILIZED, FOR SOLUTION INTRAVENOUS at 16:46

## 2019-01-09 RX ADMIN — CEFEPIME HYDROCHLORIDE 2 G: 2 INJECTION, POWDER, FOR SOLUTION INTRAVENOUS at 15:05

## 2019-01-09 RX ADMIN — ACETAMINOPHEN 650 MG: 325 TABLET ORAL at 21:35

## 2019-01-09 RX ADMIN — SODIUM CHLORIDE 125 ML/HR: 900 INJECTION, SOLUTION INTRAVENOUS at 23:40

## 2019-01-09 RX ADMIN — SODIUM CHLORIDE 1000 ML: 900 INJECTION, SOLUTION INTRAVENOUS at 12:16

## 2019-01-09 RX ADMIN — SODIUM CHLORIDE 1000 ML: 900 INJECTION, SOLUTION INTRAVENOUS at 15:03

## 2019-01-09 RX ADMIN — Medication 10 ML: at 23:24

## 2019-01-09 RX ADMIN — HEPARIN SODIUM 5000 UNITS: 5000 INJECTION INTRAVENOUS; SUBCUTANEOUS at 21:25

## 2019-01-09 RX ADMIN — PIPERACILLIN SODIUM,TAZOBACTAM SODIUM 3.38 G: 3; .375 INJECTION, POWDER, FOR SOLUTION INTRAVENOUS at 21:25

## 2019-01-09 RX ADMIN — SODIUM CHLORIDE 125 ML/HR: 900 INJECTION, SOLUTION INTRAVENOUS at 17:52

## 2019-01-09 RX ADMIN — SODIUM CHLORIDE 1000 ML: 900 INJECTION, SOLUTION INTRAVENOUS at 16:44

## 2019-01-09 RX ADMIN — ACETAMINOPHEN 1000 MG: 500 TABLET ORAL at 15:03

## 2019-01-09 RX ADMIN — Medication 1 CAPSULE: at 16:44

## 2019-01-09 NOTE — TELEPHONE ENCOUNTER
01/09/19 9:07 AM Called patient and left a message. Will attempt to call the patient again later this morning.

## 2019-01-09 NOTE — PROGRESS NOTES
[id number] [facility] Oncology at [address]  [phone]    Hematology / Oncology Established Visit    Reason for Visit:   Earle Mohamud is a 41 y.o. male who comes in for f/u of lymphoma. Hematology Oncology Treatment History:     Diagnosis: Follicular lymphoma    Stage: IV    Pathology:   11/13/18 right inguinal LN excision: Follicular lymphoma, high-grade (grade 3a of 3). Comment   The delaney architecture is entirely effaced by atypical lymphocytic proliferation with prominent nodular growth pattern. The majority of the atypical lymphocytes are small to medium in size and have irregular nuclear outlines and inconspicuous nucleoli, morphologically consistent with centrocytes. Scattered large lymphocytes with prominent nucleoli, consistent with centroblasts are identified (> 15 per high-power field). Focal increase in mitotic figures is noted. Occasional follicular dendritic cells are seen. By immunohistochemistry, the atypical lymphocytes are positive for CD20, PAX5, CD10, BCL6 and BCL2 (weak, focal) and negative for MUM1. CD3, CD5 and CD43 stain numerous background T lymphocytes. Ki-67 reveals a high proliferation index in the neoplastic follicles (overall 73-37%). Clinical history indicates 14 cm retroperitoneal mass with bilateral inguinal lymphadenopathy. In summary, the combined morphologic and phenotypic findings are diagnostic of a high-grade follicular lymphoma (grade 3a of 3). There is no evidence of diffuse large B-cell lymphoma. Flow cytometry analysis:   Monoclonal B-cell population (47 % of all cells) with mild increase in side and forward scatter properties expressing CD19, CD20, CD23 and CD10 with surface lambda light chain restriction. No phenotypically aberrant T-cell population. Flow cytometry was performed at [email or web address]     Prior Treatment: None    Current Treatment: Obinutuzumab-CHOP.  Obinutuzumab: 1000 mg weekly on days 1, 8, 15 for cycle 1, then 1000 mg on day 1 q21 days for cycles 2-6, then monotherapy 1000 mg every 21 days for cycle 7, 8 with Cyclophosphamide 750mg/m2, Doxorubicin 50mg/m2, Vincristine 1.4mg/m2 on day 1 and Prednisone 100mg on Days 1-5, every 21 days for a total of 6 cycles. History of Present Illness:   Mr. Zapata is a 42 y/o male with HTN who comes in for evaluation of Follicular lymphoma. He states he was in his usual state of health in early November 2018, but then he developed low back pain and presented to the ER. The pain was described as constant, radiating to the buttocks, without exacerbating or alleviating factors, associated w/ increased urination, no n/v/d, no dysuria, no fever, he's unsure about weight loss. Work-up at outside hospital revealed a retroperitoneal mass seen on CT imaging, and he was transferred to [facility] for further work-up. CT there showed a large retroperitoneal mass encircling the aorta with invasion of the left renal hilum and left adrenal gland. There were bilateral inguinal lymph nodes and moderate left hydronephrosis. He was evaluated while at [facility] and was noted to have palpable nodes in his groin for approximately the past 1 month. No testicular mass, anorexia, weight loss, fevers, night sweats, chest pain, shortness of breath, abdominal pain or nausea. He underwent excisional LN biopsy of right inguinal LN. He was told that he had likely lymphoma. He was advised to follow up as outpatient for PET scan, bone marrow biopsy. However, patient states he was lost to f/u. He never received any calls or appointment details. His aunt urged patient to seek care elsewhere and his PCP recommended [facility]. At our first meeting on 12/13/18, he tells me that he was working full time, feeling well until early Nov 2018. When he developed the left lower back pain, he went to [facility] and [facility]. No fevers, chills, night sweats. He has lost 15 lbs in the past month due to low appetite. No n/v/d.  No current constipation. No CP, SOB. No h/o cardiac disease aside from HTN, Hyperlipiemia. No hematochezia/melena, hematuria. He has HTN and XOL, which he was taking meds for, but has run out. Has no PCP currently. He is uninsured. He works as a cook, currently working part time. He has children aged 12 and 13. Interval history: Today, patient in for urgent follow up for possible dental infection. Pain started yesterday along lower left jaw line and has progressed to the right upper jaw today. The pain 7/10 is constant, described as throbbing and sharp. No puss, blood or drainage noted on exam. He is able to swallow, however it is difficult to chew his food with the pain. He has tried tylenol with minimal relief and ran out of his oxycodone on Monday that he takes for chronic low back pain. His vitals signs show tachycardia, with a , low grade temperature of 99.4. He last took tylenol at 10 pm last night. He reports chills and subjective fevers while at home. Patient reports a history of poor dentition with dental infections in the past.     FAMILY HISTORY:  His father has a history of leukemia, currently in remission, [place] [facility] LYMPHATICS:  Bilateral palpable inguinal adenopathy. Past Medical History:   Diagnosis Date    Anxiety     Hyperlipidemia     Hypertension     Lymphadenopathy 11/12/2018      History reviewed. No pertinent surgical history. Social History     Tobacco Use    Smoking status: Current Every Day Smoker     Packs/day: 1.00     Years: 20.00     Pack years: 20.00    Smokeless tobacco: Never Used   Substance Use Topics    Alcohol use: No     Frequency: Never      Family History   Problem Relation Age of Onset    Hypertension Father     Diabetes Mother      Current Outpatient Medications   Medication Sig    magic mouthwash solution Take 15-30 mL by mouth four (4) times daily.  Magic mouth wash   Maalox  Lidocaine 2% viscous   Diphenhydramine oral solution    Swish and spit for mouth pain, ok to swallow for throat pain     Pharmacy to mix equal portions of ingredients to a total volume as indicated in the dispense amount.  amoxicillin-clavulanate (AUGMENTIN) 875-125 mg per tablet Take 1 Tab by mouth every twelve (12) hours for 7 days.  simvastatin (ZOCOR) 20 mg tablet Take 1 Tab by mouth nightly.  prochlorperazine (COMPAZINE) 10 mg tablet Take 1 Tab by mouth every six (6) hours as needed.  LORazepam (ATIVAN) 0.5 mg tablet Take 1 Tab by mouth every eight (8) hours as needed. Max Daily Amount: 1.5 mg.    hydroCHLOROthiazide (HYDRODIURIL) 12.5 mg tablet Take 1 Tab by mouth daily.  lisinopril (PRINIVIL, ZESTRIL) 10 mg tablet Take 1 Tab by mouth daily.  senna-docusate (PERICOLACE) 8.6-50 mg per tablet Take 1 Tab by mouth daily.  ondansetron hcl (ZOFRAN) 8 mg tablet Take 1 Tab by mouth every eight (8) hours as needed for Nausea.  predniSONE (DELTASONE) 20 mg tablet Take 100 mg by mouth daily (with breakfast). (on days 1-5 of each chemo cycle)    naloxone (NARCAN) 4 mg/actuation nasal spray Use 1 spray intranasally, then discard. Repeat with new spray every 2 min as needed for opioid overdose symptoms, alternating nostrils.  acetaminophen/diphenhydramine (TYLENOL PM PO) Take  by mouth.  aspirin delayed-release (ASPIR-81) 81 mg tablet Take 1 Tab by mouth daily.  oxyCODONE (OXYIR) 5 mg capsule Take 1 Cap by mouth every six (6) hours as needed. Max Daily Amount: 20 mg. No current facility-administered medications for this visit. Facility-Administered Medications Ordered in Other Visits   Medication Dose Route Frequency    cefepime (MAXIPIME) 1 g in 0.9% sodium chloride 50 mL  1 g IntraVENous ONCE      No Known Allergies     Review of Systems: A complete review of systems was obtained, negative except as described above.     Physical Exam:     Visit Vitals  BP (!) 146/95 (BP 1 Location: Left arm, BP Patient Position: Sitting)   Pulse (!) 122   Temp 99.4 °F (37.4 °C) (Oral)   Ht 5' 7\" (1.702 m)   Wt 146 lb 9.6 oz (66.5 kg)   SpO2 98%   BMI 22.96 kg/m²     ECOG PS: 0  General: Well developed, no acute distress  Eyes: PERRLA, EOMI, anicteric sclerae  HENT: Poor dentition,cracked teeth, multiple dental caries, right lower and left upper gum line is erythematous, atraumatic, OP clear, TMs intact without erythema  Neck: Supple  Lymphatic: No supraclavicular, axillary. R cervical 2cm LN noted. Bilateral small 2-3cm palpable inguinal adenopathy  Respiratory: CTAB, normal respiratory effort  CV: Normal rate, regular rhythm, no murmurs, no peripheral edema  GI: Soft, nontender, nondistended, no masses, no hepatomegaly, no splenomegaly  MS: Normal gait and station. Digits without clubbing or cyanosis. Skin: No rashes, ecchymoses, or petechiae. Normal temperature, turgor, and texture. Neuro/Psych: Alert, oriented. 5/5 strength in all 4 extremities. Appropriate affect, normal judgment/insight. Results:     Lab Results   Component Value Date/Time    WBC 1.1 (L) 01/09/2019 12:09 PM    HGB 11.8 (L) 01/09/2019 12:09 PM    HCT 33.3 (L) 01/09/2019 12:09 PM    PLATELET 99 (L) 76/83/2679 12:09 PM    MCV 85.8 01/09/2019 12:09 PM    ABS.  NEUTROPHILS 0.0 (L) 01/09/2019 12:09 PM    Hemoglobin (POC) 15.0 06/05/2009 02:13 PM    Hematocrit (POC) 44 06/05/2009 02:13 PM     Lab Results   Component Value Date/Time    Sodium 132 (L) 01/09/2019 12:09 PM    Potassium 3.5 01/09/2019 12:09 PM    Chloride 98 01/09/2019 12:09 PM    CO2 23 01/09/2019 12:09 PM    Glucose 136 (H) 01/09/2019 12:09 PM    BUN 7 01/09/2019 12:09 PM    Creatinine 1.16 01/09/2019 12:09 PM    GFR est AA >60 01/09/2019 12:09 PM    GFR est non-AA >60 01/09/2019 12:09 PM    Calcium 8.8 01/09/2019 12:09 PM    Sodium (POC) 139 06/05/2009 02:13 PM    Potassium (POC) 3.9 06/05/2009 02:13 PM    Chloride (POC) 103 06/05/2009 02:13 PM    Glucose (POC) 98 06/05/2009 02:13 PM    BUN (POC) 9 06/05/2009 02:13 PM    Creatinine (POC) 1.0 2009 02:13 PM    Calcium, ionized (POC) 1.21 2009 02:13 PM     Lab Results   Component Value Date/Time    Bilirubin, total 0.5 2019 12:09 PM    ALT (SGPT) 42 2019 12:09 PM    AST (SGOT) 17 2019 12:09 PM    Alk. phosphatase 95 2019 12:09 PM    Protein, total 7.0 2019 12:09 PM    Albumin 2.9 (L) 2019 12:09 PM    Globulin 4.1 (H) 2019 12:09 PM     No results found for: IRON, FE, TIBC, IBCT, PSAT, FERR    No results found for: B12LT, FOL, RBCF  Lab Results   Component Value Date/Time    TSH 1.53 2016 04:40 AM     18:     Lab Results   Component Value Date/Time    Hepatitis A, IgM NONREACTIVE 2018 04:53 PM    Hepatitis B surface Ag <0.10 2018 04:53 PM    Hepatitis B core, IgM NONREACTIVE 2018 04:53 PM         Imagin/9/18 Abd/pelvis CT: IMPRESSION:  1. Interval development of a large retroperitoneal mass encircling the aorta with invasion of the left renal hilum and left adrenal gland. Several adjacent lymph nodes are seen extending into the peritoneum and underneath the  diaphragmatic natalie. This most likely represents lymphoma. 2. Several new bilateral enlarged inguinal lymph nodes also likely representing lymphoma. 3. Moderate left hydronephrosis with a delayed renal nephrogram related to decreased renal function. This is related to the invasion of the renal hilum. 18 Chest CT: IMPRESSION:  Trace left pleural effusion. Bilateral lower lobe atelectasis. Large  retroperitoneal mass lesion again demonstrated. PET 18:  FINDINGS:  HEAD/NECK: Right palatine tonsil intense hypermetabolism, max SUV 18. Multilevel  bilateral cervical adenopathy, with max SUV 12 in a left supraclavicular node. Cerebral evaluation is limited by normal intense activity. CHEST: Solitary hypermetabolic left axillary node, max SUV 11.   ABDOMEN/PELVIS: Bulky retroperitoneal mass max SUV 27, with several additional  small active abdomino-pelvic nodes. Bilateral inguinal nodes with max SUV 12 on  the left. SKELETON: No foci of abnormal hypermetabolism in the axial and visualized  appendicular skeleton. IMPRESSION:   1. Right palatine tonsil tumor involvement (Deauville 5). 2. Bilateral cervical delaney involvement (Deauville 5). 3. Left axillary node involvement (Deauville 5). 4. Bulky retroperitoneal lymphoma mass and additional smaller hypermetabolic  abdomino-pelvic nodes (Deauville 5). 5. Bilateral inguinal delaney involvement (Deauville 5). Deauville Five Point Scale  1. No uptake or no residual uptake (when used interim)  2. Slight uptake, but below blood pool (mediastinum)  3. Uptake above mediastinal, but below/equal to uptake in the liver  4. Uptake slightly to moderately higher than liver  5. Markedly increased uptake or any new lesion (on response evaluation)  Each FDG-avid (or previously FDG avid) lesion is rated independently. Reference values:  Mediastinal blood pool: 2.1 SUV  Liver (background): 2.2 SUV    Assessment & Plan:   Bassam Ledezma is a 41 y.o. male comes in for evaluation and management of lymphoma. 1. Sepsis  He presented with acute dental pain with exam concerning for dental abscess. Considerable tachycardia. We sent him to the [address] [place] [address] for labs and hydration, including cultures. His CBC returned with severe neutropenia (ANC 0), and he spiked a fever to 102 in the [address] [place] [address], so he was sent to the ED for acute management. He needs IV abx (cefepime or zoysn). His cultures was drawn from port here, so he also needs peripheral cultures. Needs admission, given the clear source. Ideally would have dental or oral surgeon evaluation as well. NP called and spoke to ED physician. 2. Follicular lymphoma: Grade 3a. -- S/p C#1D#8 of O-CHOP on 1/3/2019. Next dose of therapy due tomorrow, but will be held given his sepsis, discussed below.     3. Use of cardiotoxic chemotherapy: Discussed risks/benefits of using doxorubicin. Echo on 12/17/18 showed EF 65%. -- Referred to Cardiology    4. Neoplasm related pain / Anxiety: Left lower back pain. Taking Oxycodone 5mg bid prn. Signed pain contract on 12/28/18. Counseled on adding SSRI if anxiety becomes worse. -- Continue Oxycodone 5mg every 6hrs prn for pain, 60 tabs given and stool softeners, refilled 1/9/2019 by David Arriaza. -- Ativan 0.5mg bid prn, 60 tabs written 12/28/18    5. HTN / Hyperlipidemia: Uncontrolled as pt has been out of his meds for 1 weeks. -- On Lisinopril, HCTZ, Lipitor. 6. Tobacco abuse: Counseled on risk of smoking, benefits of quitting. Discussed cessation strategies. Still smoking a pack per day. 7. Nausea: Taking Zofran every 6 hours with minimal relief of nausea. -- Add compazine     8. Constipation: BM every 3 days. Only taking docusate every other day. Counseled to start taking daily and add a laxative if the constipation persists after increasing docusate.          Signed By: Bryan De Dios MD

## 2019-01-09 NOTE — ED TRIAGE NOTES
Patient arrive via ems from the infusion center, port in place, patient is febrile. Patient states he has a known mouth infection, dx yesterday, states he has not yet picked up or started his prescription.

## 2019-01-09 NOTE — PROGRESS NOTES
[facility] VISIT NOTE    1140  Pt arrived at [facility] ambulatory and in no distress for hydration/labs  Assessment completed, pt c/o feeling lethargic. Right chest port accessed with . 75 in joshi with no difficulty. Positive blood return noted and labs drawn. Medications received:  NS 1000ml    Tolerated treatment well, no adverse reaction noted. Port remained accessed pt taken to ED by EMS per NP Hemant Webber due to neutropenia and fever. Recent Results (from the past 12 hour(s))   CBC WITH AUTOMATED DIFF    Collection Time: 01/09/19 12:09 PM   Result Value Ref Range    WBC 1.1 (L) 4.1 - 11.1 K/uL    RBC 3.88 (L) 4.10 - 5.70 M/uL    HGB 11.8 (L) 12.1 - 17.0 g/dL    HCT 33.3 (L) 36.6 - 50.3 %    MCV 85.8 80.0 - 99.0 FL    MCH 30.4 26.0 - 34.0 PG    MCHC 35.4 30.0 - 36.5 g/dL    RDW 12.7 11.5 - 14.5 %    PLATELET 99 (L) 054 - 400 K/uL    MPV 10.5 8.9 - 12.9 FL    NRBC 0.0 0  WBC    ABSOLUTE NRBC 0.00 0.00 - 0.01 K/uL    NEUTROPHILS 4 (L) 32 - 75 %    LYMPHOCYTES 63 (H) 12 - 49 %    MONOCYTES 29 (H) 5 - 13 %    EOSINOPHILS 0 0 - 7 %    BASOPHILS 4 (H) 0 - 1 %    IMMATURE GRANULOCYTES 0 0.0 - 0.5 %    ABS. NEUTROPHILS 0.0 (L) 1.8 - 8.0 K/UL    ABS. LYMPHOCYTES 0.8 0.8 - 3.5 K/UL    ABS. MONOCYTES 0.3 0.0 - 1.0 K/UL    ABS. EOSINOPHILS 0.0 0.0 - 0.4 K/UL    ABS. BASOPHILS 0.0 0.0 - 0.1 K/UL    ABS. IMM.  GRANS. 0.0 0.00 - 0.04 K/UL    DF MANUAL      PLATELET COMMENTS Large Platelets      RBC COMMENTS NORMOCYTIC, NORMOCHROMIC     METABOLIC PANEL, COMPREHENSIVE    Collection Time: 01/09/19 12:09 PM   Result Value Ref Range    Sodium 132 (L) 136 - 145 mmol/L    Potassium 3.5 3.5 - 5.1 mmol/L    Chloride 98 97 - 108 mmol/L    CO2 23 21 - 32 mmol/L    Anion gap 11 5 - 15 mmol/L    Glucose 136 (H) 65 - 100 mg/dL    BUN 7 6 - 20 MG/DL    Creatinine 1.16 0.70 - 1.30 MG/DL    BUN/Creatinine ratio 6 (L) 12 - 20      GFR est AA >60 >60 ml/min/1.73m2    GFR est non-AA >60 >60 ml/min/1.73m2    Calcium 8.8 8.5 - 10.1 MG/DL Bilirubin, total 0.5 0.2 - 1.0 MG/DL    ALT (SGPT) 42 12 - 78 U/L    AST (SGOT) 17 15 - 37 U/L    Alk. phosphatase 95 45 - 117 U/L    Protein, total 7.0 6.4 - 8.2 g/dL    Albumin 2.9 (L) 3.5 - 5.0 g/dL    Globulin 4.1 (H) 2.0 - 4.0 g/dL    A-G Ratio 0.7 (L) 1.1 - 2.2     SAMPLES BEING HELD    Collection Time: 01/09/19  2:47 PM   Result Value Ref Range    SAMPLES BEING HELD 1PST, 1SST, 1LAV, 1BLU     COMMENT        Add-on orders for these samples will be processed based on acceptable specimen integrity and analyte stability, which may vary by analyte. Patient Vitals for the past 12 hrs:   Temp Pulse Resp BP SpO2   01/09/19 1340 (!) 102 °F (38.9 °C) (!) 119 18 (!) 141/91 98 %   01/09/19 1220 99.4 °F (37.4 °C) (!) 122 -- (!) 146/95 98 %     1420  EMS transported pt to [facility] ED   .

## 2019-01-09 NOTE — H&P
[address]  [phone]    Hospitalist Admission Note      NAME:  Aquiles Krishna   :   1975   MRN:  [id number]     PCP:  None     Date/Time:  2019 3:57 PM         Assessment / Plan:       41 y.o. male with hx of follicular lymphoma on chemo presents with dental pain/carries and neutropenic fever. Sepsis / Neutropenic fever: likely from odontogenic infection, possible abscess. Given cefepime in the ED. Change to Zosyn for additional anaerobic coverage. Add vancomycin due to dental infection and mucositis (addition of gram-positive coverage recommended in this situation because of the increased risk of viridans group streptococcal infections). Watch renal function with concomitant use of Vanc and Zosyn. Check CT mandible to evaluate for abscess. With ?trismus. ENT consult. For completion check CXR, UA. Follow blood cultures. IVF. Lactate WNR. No evidence of severe sepsis or end-organ injury. Neutropenia / thrombocytopenia / anemia / thrombocytopenia: due to chemo. Watch CBC with diff. Monitor need for G-CSF. Watch PLT count on heparin SQ for DVT prophylaxis      HTN (hypertension): hold all antihypertensives due to sepsis. Watch for hypotension. Tobacco abuse: smokes ~one ppd. Advised smoking cessation. Start nicotine patch      Hyperlipidemia (): continue statin      Follicular lymphoma ([address]): on chemo. Followed by Dr. Andie Ochoa.        Anxiety: continue Ativan PRN      Code Status: FULL     Surrogate decision maker: father      Previous notes and lab results reviewed, including ED and hem/onc notes      Total time spent with patient: 79 Minutes   Time spent in the care of this patient included reviewing records, discussing with nursing, obtaining history and examining the patient, and discussing treatment plans, with >50% time spent counseling/coordinating care    Risk of deterioration: High                 Care Plan discussed with: ED provider, Patient, Nursing Staff and >50% of time spent in counseling and coordination of care    Discussed:  Care Plan    Prophylaxis:  Hep SQ    Disposition:  Home w/Family                 Subjective:     CHIEF COMPLAINT: dental pain / fevers and chills    HISTORY OF PRESENT ILLNESS:     Mr. Elo Alains is a 41 y.o. male w/ hx of follicular lymphoma on chemo who presents with dental pain. Started yesterday, involving both lower and upper teeth, but mostly lower, associated with jaw swelling, pain is severe, sharp, worse with eating, having difficulties opening jaw, associated with fevers and chills. No HA, cough, abdominal pain, n/v/d, dysuria. ED workup showed neutropenia, fevers. Mr. Elo Alanis is admitted for further evaluation and management of neutropenic fever. Past Medical History:   Diagnosis Date    Anxiety     Hyperlipidemia     Hypertension     Lymphadenopathy 11/12/2018        History reviewed. No pertinent surgical history. Social History     Tobacco Use    Smoking status: Current Every Day Smoker     Packs/day: 1.00     Years: 20.00     Pack years: 20.00    Smokeless tobacco: Never Used   Substance Use Topics    Alcohol use: No     Frequency: Never        Family History   Problem Relation Age of Onset    Hypertension Father     Diabetes Mother       No Known Allergies     Prior to Admission medications    Medication Sig Start Date End Date Taking? Authorizing Provider   magic mouthwash solution Take 15-30 mL by mouth four (4) times daily. Magic mouth wash   Maalox  Lidocaine 2% viscous   Diphenhydramine oral solution    Swish and spit for mouth pain, ok to swallow for throat pain     Pharmacy to mix equal portions of ingredients to a total volume as indicated in the dispense amount. 1/9/19   Dori Gutiérrez NP   amoxicillin-clavulanate (AUGMENTIN) 875-125 mg per tablet Take 1 Tab by mouth every twelve (12) hours for 7 days.  1/9/19 1/16/19  Dori Gutiérrez NP   oxyCODONE (OXYIR) 5 mg capsule Take 1 Cap by mouth every six (6) hours as needed. Max Daily Amount: 20 mg. 1/9/19   Wash Elayne SOTELO NP   simvastatin (ZOCOR) 20 mg tablet Take 1 Tab by mouth nightly. 1/8/19   Inés Bowers MD   prochlorperazine (COMPAZINE) 10 mg tablet Take 1 Tab by mouth every six (6) hours as needed. 1/3/19   Earma Mcburney, NP   LORazepam (ATIVAN) 0.5 mg tablet Take 1 Tab by mouth every eight (8) hours as needed. Max Daily Amount: 1.5 mg. 12/28/18   Inés Bowers MD   hydroCHLOROthiazide (HYDRODIURIL) 12.5 mg tablet Take 1 Tab by mouth daily. 12/13/18   Inés Bowers MD   lisinopril (PRINIVIL, ZESTRIL) 10 mg tablet Take 1 Tab by mouth daily. 12/13/18   Ricky Dickson MD   senna-docusate (PERICOLACE) 8.6-50 mg per tablet Take 1 Tab by mouth daily. 12/13/18   Inés Bowers MD   ondansetron hcl (ZOFRAN) 8 mg tablet Take 1 Tab by mouth every eight (8) hours as needed for Nausea. 12/13/18   Inés Bowers MD   predniSONE (DELTASONE) 20 mg tablet Take 100 mg by mouth daily (with breakfast). (on days 1-5 of each chemo cycle) 12/13/18   Inés oBwers MD   naloxone [facility]) 4 mg/actuation nasal spray Use 1 spray intranasally, then discard. Repeat with new spray every 2 min as needed for opioid overdose symptoms, alternating nostrils. 11/16/18   Manuel Marquez MD   acetaminophen/diphenhydramine (TYLENOL PM PO) Take  by mouth. Diya Aparicio MD   aspirin delayed-release (ASPIR-81) 81 mg tablet Take 1 Tab by mouth daily.  7/30/18   Reina Lozoya MD       Review of Systems:  (bold if positive, if negative)    Gen:  , fever, chills, fatigueEyes:  ENT:  dental/gum pain, swellingCVS:  Pulm:  GI:    :    MS:  Skin:  Psych:  Endo:    Hem:  Renal:    Neuro:            Objective:      VITALS:    Vital signs reviewed; most recent are:    Visit Vitals  /66   Pulse (!) 132   Temp (!) 102.6 °F (39.2 °C)   Resp 18   Ht 5' 6\" (1.676 m)   Wt 65.8 kg (145 lb)   SpO2 96%   BMI 23.40 kg/m² SpO2 Readings from Last 6 Encounters:   01/09/19 96%   01/09/19 98%   01/09/19 98%   01/03/19 97%   12/28/18 100%   12/28/18 100%        No intake or output data in the 24 hours ending 01/09/19 7716         Exam:     Physical Exam:    Gen: chronically ill-appearing, in no acute distress but obviously uncomfortable appearing  HEENT:  No scleral icterus, PERRL, hearing intact to voice, dry mucous membranes with dental carries, poor dentition, and few exudates/mild mucositis. Difficulties opening jaw, with swelling lower jaw  Neck:  Supple, no nuchal rigidity  Resp:  No accessory muscle use. CTAB without wheezing, rales, rhonchi  Card: tachycardic, reg rhythm. Normal S1 and S2 without murmurs, rubs, or gallops. No peripheral lower extremity edema. No JVD. Peripheral pulses in tact. Abd:  Normoactive bowel sounds. Soft, non-tender, non-distended. No rebound, no guarding. No appreciable hepatosplenomegaly   Lymph:  ++cervical adenopathy  Musc:  No cyanosis or clubbing  Skin:  No rashes or ulcers; turgor intact. Cap refill ~2 secs  Neuro:  Cranial nerves are grossly intact, no focal motor weakness, follows commands appropriately  Psych:  Good insight, normal affect. Alert, oriented x 3. Answers questions appropriately       Labs:    Recent Labs     01/09/19  1209   WBC 1.1*   HGB 11.8*   HCT 33.3*   PLT 99*     Recent Labs     01/09/19  1209   *   K 3.5   CL 98   CO2 23   *   BUN 7   CREA 1.16   CA 8.8   ALB 2.9*   SGOT 17   ALT 42     No components found for: GLPOC  No results for input(s): PH, PCO2, PO2, HCO3, FIO2 in the last 72 hours. No results for input(s): INR in the last 72 hours. No lab exists for component: INREXT  No results found for: SDES  Lab Results   Component Value Date/Time    Culture result: NO GROWTH 2 DAYS 01/22/2017 11:20 AM     All other current labs reviewed in the computer.       Imaging/Studies:    CXR: no acute process    ___________________________________________________    Attending Physician: Ewa Mujica MD

## 2019-01-09 NOTE — PROGRESS NOTES
[facility] Dosing Services: Antimicrobial Stewardship Progress Note    Consult for antibiotic dosing of Vancomycin by Dr. Karlie Loco  Pharmacist reviewed antibiotic appropriateness for 41year old , male  for indication of Febrile neutropenia (?mucositis too)  Day of Therapy 1    Plan:  Vancomycin therapy:  LD: 1750mg x1  MD: 1000mg q 8hrs     Dose calculated to approximate a therapeutic trough of 15-20 mcg/mL. Last trough level / Plan for level:   Level ordered for 1630, 1/10/19    Pharmacy to follow daily and will make changes to dose and/or frequency based on clinical status. Date Dose & Interval Measured (mcg/mL) Extrapolated (mcg/mL)   ? ? ? ?   ? ? ? ?   ? ? ? ? Other Antimicrobial  (not dosed by pharmacist)   Zosyn 3.375gm q 8hrs (EI)   Cultures     1/9: Blood x2- pending   Serum Creatinine     Lab Results   Component Value Date/Time    Creatinine 1.16 01/09/2019 12:09 PM    Creatinine (POC) 1.0 06/05/2009 02:13 PM       Creatinine Clearance Estimated Creatinine Clearance: 75.6 mL/min (based on SCr of 1.16 mg/dL). Temp   (!) 102.6 °F (39.2 °C)      WBC   Lab Results   Component Value Date/Time    WBC 1.1 (L) 01/09/2019 12:09 PM       H/H   Lab Results   Component Value Date/Time    HGB 11.8 (L) 01/09/2019 12:09 PM        Platelets   Lab Results   Component Value Date/Time    PLATELET 99 (L) 43/26/0505 12:09 PM          Pharmacist: Kristin Angel

## 2019-01-09 NOTE — ED PROVIDER NOTES
41 y.o. male with past medical history significant for anxiety, hyperlipidemia, and hypertension who presents from the infusion center via EMS with chief complaint of a fever. Pt was sent to the ED by oncology for admission due to neutropenic fever. Pt reports onset of fever and chills this morning. In the ED, pt also complains of right sided gum pain and left sided facial pain. Pt reports the pain is exacerbated when opening his mouth. Pt states he was diagnosed with a mouth infection yesterday and started on an antibiotic, but states he has been unable to fill out his prescription and start taking the medication. Pt reports history of lymphoma and has been receiving chemotherapy weekly since 12/27/18. Pt denies taking any medication for the fever today. Pt denies any nausea, vomiting, chest pain, or dyspnea. There are no other acute medical concerns at this time. Social hx - Tobacco use: current smoker, Alcohol Use: none    Oncologist: Eugenia Reed MD    Note written by Elidia Kerns, as dictated by Lizbet Arellano NP 2:35 PM.                 Past Medical History:   Diagnosis Date    Anxiety     Hyperlipidemia     Hypertension     Lymphadenopathy 11/12/2018       No past surgical history on file.       Family History:   Problem Relation Age of Onset    Hypertension Father     Diabetes Mother        Social History     Socioeconomic History    Marital status: SINGLE     Spouse name: Not on file    Number of children: Not on file    Years of education: Not on file    Highest education level: Not on file   Social Needs    Financial resource strain: Not on file    Food insecurity - worry: Not on file    Food insecurity - inability: Not on file   [employer] needs - medical: Not on file   [employer] needs - non-medical: Not on file   Occupational History    Not on file   Tobacco Use    Smoking status: Current Every Day Smoker     Packs/day: 1.00     Years: 20.00     Pack years: 20.00    Smokeless tobacco: Never Used   Substance and Sexual Activity    Alcohol use: No     Frequency: Never    Drug use: No    Sexual activity: Yes   Other Topics Concern    Not on file   Social History Narrative    Not on file     ALLERGIES: Patient has no known allergies. Review of Systems   Constitutional: Positive for chills and fever. Negative for appetite change. HENT: Positive for dental problem. Respiratory: Negative for cough, shortness of breath and wheezing. Cardiovascular: Negative for chest pain. Gastrointestinal: Negative for abdominal pain, constipation, diarrhea, nausea and vomiting. Genitourinary: Negative for dysuria and urgency. Musculoskeletal: Negative for back pain. Skin: Negative for color change and rash. Neurological: Negative for dizziness and headaches. Psychiatric/Behavioral: Negative. All other systems reviewed and are negative. Vitals:    01/09/19 1430   BP: (!) 153/92   Pulse: (!) 132   Resp: 18   Temp: (!) 102.6 °F (39.2 °C)   SpO2: 99%   Weight: 65.8 kg (145 lb)   Height: 5' 6\" (1.676 m)          Physical Exam   Constitutional: He is oriented to person, place, and time. He appears well-developed and well-nourished. HENT:   Head: Normocephalic and atraumatic. Nose: Nose normal.   Multiple broken teeth and significant dental caries. Difficulty opening mouth fully secondary to pain. TTP over left cheek and TMJ. Obvious swelling over the right lower jaw. Erythema to the gums upper and lower. TTP over upper and lower gingiva. Neck: Normal range of motion. Neck supple. Cardiovascular: Normal rate, regular rhythm, normal heart sounds and intact distal pulses. Pulmonary/Chest: Effort normal and breath sounds normal. No respiratory distress. He has no wheezes. He has no rales. He exhibits no tenderness. Abdominal: Soft. Bowel sounds are normal. There is no tenderness. There is no guarding. Musculoskeletal: Normal range of motion. Lymphadenopathy:        Head (right side): Submandibular and tonsillar adenopathy present. No submental, no preauricular, no posterior auricular and no occipital adenopathy present. Head (left side): Submandibular and tonsillar adenopathy present. No submental, no preauricular, no posterior auricular and no occipital adenopathy present. He has cervical adenopathy. Right cervical: Superficial cervical adenopathy present. Left cervical: Superficial cervical adenopathy present. Neurological: He is alert and oriented to person, place, and time. Skin: Skin is warm and dry. No erythema. Psychiatric: He has a normal mood and affect. His behavior is normal. Judgment and thought content normal.   Nursing note and vitals reviewed. MDM       Procedures    Assessment & Plan:     Orders Placed This Encounter    CULTURE, BLOOD    Hold Sample    POC  LACTIC ACID    cefepime (MAXIPIME) 1 g in 0.9% sodium chloride (MBP/ADV) 50 mL     CBC & CMP completed at the infusion center. ANC of 0.0, WBC 1.1. Has dental abscess which is the likely the source. Discussed with Clover Cole MD,ED Provider    Lizbet Arellano NP  01/09/19  2:34 PM    Spoke with NP from Dr. Hayes Bernal and Margarita's office. They believe the source to be his dental abscess. They would like the hospitalist to admit. Dr. Hayes Bernal sara consult. Lizbet Arellano NP  01/09/19  3:00 PM    Spoke with Dr. Thelma Waller. Will admit for neutropenic fever. Asked to order CXR & UA.     3:17 PM  Patient is being admitted to the hospital.  The results of their tests and reasons for their admission have been discussed with them and/or available family. They convey agreement and understanding for the need to be admitted and for their admission diagnosis. Consultation has been made with the inpatient physician specialist for hospitalization.     LABORATORY TESTS:  Recent Results (from the past 12 hour(s))   CBC WITH AUTOMATED DIFF    Collection Time: 01/09/19 12:09 PM   Result Value Ref Range    WBC 1.1 (L) 4.1 - 11.1 K/uL    RBC 3.88 (L) 4.10 - 5.70 M/uL    HGB 11.8 (L) 12.1 - 17.0 g/dL    HCT 33.3 (L) 36.6 - 50.3 %    MCV 85.8 80.0 - 99.0 FL    MCH 30.4 26.0 - 34.0 PG    MCHC 35.4 30.0 - 36.5 g/dL    RDW 12.7 11.5 - 14.5 %    PLATELET 99 (L) 410 - 400 K/uL    MPV 10.5 8.9 - 12.9 FL    NRBC 0.0 0  WBC    ABSOLUTE NRBC 0.00 0.00 - 0.01 K/uL    NEUTROPHILS 4 (L) 32 - 75 %    LYMPHOCYTES 63 (H) 12 - 49 %    MONOCYTES 29 (H) 5 - 13 %    EOSINOPHILS 0 0 - 7 %    BASOPHILS 4 (H) 0 - 1 %    IMMATURE GRANULOCYTES 0 0.0 - 0.5 %    ABS. NEUTROPHILS 0.0 (L) 1.8 - 8.0 K/UL    ABS. LYMPHOCYTES 0.8 0.8 - 3.5 K/UL    ABS. MONOCYTES 0.3 0.0 - 1.0 K/UL    ABS. EOSINOPHILS 0.0 0.0 - 0.4 K/UL    ABS. BASOPHILS 0.0 0.0 - 0.1 K/UL    ABS. IMM. GRANS. 0.0 0.00 - 0.04 K/UL    DF MANUAL      PLATELET COMMENTS Large Platelets      RBC COMMENTS NORMOCYTIC, NORMOCHROMIC     METABOLIC PANEL, COMPREHENSIVE    Collection Time: 01/09/19 12:09 PM   Result Value Ref Range    Sodium 132 (L) 136 - 145 mmol/L    Potassium 3.5 3.5 - 5.1 mmol/L    Chloride 98 97 - 108 mmol/L    CO2 23 21 - 32 mmol/L    Anion gap 11 5 - 15 mmol/L    Glucose 136 (H) 65 - 100 mg/dL    BUN 7 6 - 20 MG/DL    Creatinine 1.16 0.70 - 1.30 MG/DL    BUN/Creatinine ratio 6 (L) 12 - 20      GFR est AA >60 >60 ml/min/1.73m2    GFR est non-AA >60 >60 ml/min/1.73m2    Calcium 8.8 8.5 - 10.1 MG/DL    Bilirubin, total 0.5 0.2 - 1.0 MG/DL    ALT (SGPT) 42 12 - 78 U/L    AST (SGOT) 17 15 - 37 U/L    Alk.  phosphatase 95 45 - 117 U/L    Protein, total 7.0 6.4 - 8.2 g/dL    Albumin 2.9 (L) 3.5 - 5.0 g/dL    Globulin 4.1 (H) 2.0 - 4.0 g/dL    A-G Ratio 0.7 (L) 1.1 - 2.2     SAMPLES BEING HELD    Collection Time: 01/09/19  2:47 PM   Result Value Ref Range    SAMPLES BEING HELD 1PST, 1SST, 1LAV, 1BLU     COMMENT        Add-on orders for these samples will be processed based on acceptable specimen integrity and analyte stability, which may vary by analyte. POC LACTIC ACID    Collection Time: 01/09/19  2:47 PM   Result Value Ref Range    Lactic Acid (POC) 0.58 0.40 - 2.00 mmol/L       IMAGING RESULTS:  XR CHEST PA LAT    (Results Pending)     No results found. MEDICATIONS GIVEN:  Medications   cefepime (MAXIPIME) 2 g in 0.9% sodium chloride (MBP/ADV) 100 mL (2 g IntraVENous New Bag 1/9/19 1505)   sodium chloride 0.9 % bolus infusion 1,000 mL (1,000 mL IntraVENous New Bag 1/9/19 1503)   acetaminophen (TYLENOL) tablet 1,000 mg (1,000 mg Oral Given 1/9/19 1503)       IMPRESSION:  1. Neutropenic fever ([address]    2. Dental abscess        PLAN:  1.  Admit to hospitalist      Carmen Rodriguez NP

## 2019-01-10 ENCOUNTER — TELEPHONE (OUTPATIENT)
Dept: ONCOLOGY | Age: 44
End: 2019-01-10

## 2019-01-10 ENCOUNTER — HOSPITAL ENCOUNTER (OUTPATIENT)
Dept: INFUSION THERAPY | Age: 44
End: 2019-01-10
Payer: MEDICAID

## 2019-01-10 LAB
ALBUMIN SERPL-MCNC: 2 G/DL (ref 3.5–5)
ALBUMIN/GLOB SERPL: 0.6 {RATIO} (ref 1.1–2.2)
ALP SERPL-CCNC: 73 U/L (ref 45–117)
ALT SERPL-CCNC: 38 U/L (ref 12–78)
ANION GAP SERPL CALC-SCNC: 10 MMOL/L (ref 5–15)
AST SERPL-CCNC: 14 U/L (ref 15–37)
BASOPHILS # BLD: 0 K/UL (ref 0–0.1)
BASOPHILS NFR BLD: 4 % (ref 0–1)
BILIRUB SERPL-MCNC: 0.3 MG/DL (ref 0.2–1)
BUN SERPL-MCNC: 6 MG/DL (ref 6–20)
BUN/CREAT SERPL: 6 (ref 12–20)
CALCIUM SERPL-MCNC: 7.9 MG/DL (ref 8.5–10.1)
CHLORIDE SERPL-SCNC: 106 MMOL/L (ref 97–108)
CO2 SERPL-SCNC: 22 MMOL/L (ref 21–32)
CREAT SERPL-MCNC: 1.05 MG/DL (ref 0.7–1.3)
DATE LAST DOSE: ABNORMAL
DIFFERENTIAL METHOD BLD: ABNORMAL
EOSINOPHIL # BLD: 0.1 K/UL (ref 0–0.4)
EOSINOPHIL NFR BLD: 8 % (ref 0–7)
ERYTHROCYTE [DISTWIDTH] IN BLOOD BY AUTOMATED COUNT: 12.6 % (ref 11.5–14.5)
GLOBULIN SER CALC-MCNC: 3.4 G/DL (ref 2–4)
GLUCOSE SERPL-MCNC: 119 MG/DL (ref 65–100)
HCT VFR BLD AUTO: 28.4 % (ref 36.6–50.3)
HGB BLD-MCNC: 9.6 G/DL (ref 12.1–17)
IMM GRANULOCYTES # BLD AUTO: 0 K/UL
IMM GRANULOCYTES NFR BLD AUTO: 0 %
LYMPHOCYTES # BLD: 0.5 K/UL (ref 0.8–3.5)
LYMPHOCYTES NFR BLD: 46 % (ref 12–49)
MCH RBC QN AUTO: 29.8 PG (ref 26–34)
MCHC RBC AUTO-ENTMCNC: 33.8 G/DL (ref 30–36.5)
MCV RBC AUTO: 88.2 FL (ref 80–99)
MONOCYTES # BLD: 0.5 K/UL (ref 0–1)
MONOCYTES NFR BLD: 42 % (ref 5–13)
NEUTS SEG # BLD: 0 K/UL (ref 1.8–8)
NEUTS SEG NFR BLD: 0 % (ref 32–75)
NRBC # BLD: 0 K/UL (ref 0–0.01)
NRBC BLD-RTO: 0 PER 100 WBC
PATH REV BLD -IMP: NORMAL
PLATELET # BLD AUTO: 113 K/UL (ref 150–400)
PLATELET COMMENTS,PCOM: ABNORMAL
PMV BLD AUTO: 10.3 FL (ref 8.9–12.9)
POTASSIUM SERPL-SCNC: 3.6 MMOL/L (ref 3.5–5.1)
PROT SERPL-MCNC: 5.4 G/DL (ref 6.4–8.2)
RBC # BLD AUTO: 3.22 M/UL (ref 4.1–5.7)
RBC MORPH BLD: ABNORMAL
REPORTED DOSE,DOSE: ABNORMAL UNITS
REPORTED DOSE/TIME,TMG: ABNORMAL
SODIUM SERPL-SCNC: 138 MMOL/L (ref 136–145)
VANCOMYCIN TROUGH SERPL-MCNC: 15.2 UG/ML (ref 5–10)
WBC # BLD AUTO: 1.1 K/UL (ref 4.1–11.1)
WBC MORPH BLD: ABNORMAL

## 2019-01-10 PROCEDURE — 74011000258 HC RX REV CODE- 258: Performed by: INTERNAL MEDICINE

## 2019-01-10 PROCEDURE — 80202 ASSAY OF VANCOMYCIN: CPT

## 2019-01-10 PROCEDURE — 80053 COMPREHEN METABOLIC PANEL: CPT

## 2019-01-10 PROCEDURE — 74011250636 HC RX REV CODE- 250/636: Performed by: INTERNAL MEDICINE

## 2019-01-10 PROCEDURE — 36415 COLL VENOUS BLD VENIPUNCTURE: CPT

## 2019-01-10 PROCEDURE — 85025 COMPLETE CBC W/AUTO DIFF WBC: CPT

## 2019-01-10 PROCEDURE — 65660000000 HC RM CCU STEPDOWN

## 2019-01-10 PROCEDURE — 74011250637 HC RX REV CODE- 250/637: Performed by: INTERNAL MEDICINE

## 2019-01-10 RX ADMIN — ACETAMINOPHEN 650 MG: 325 TABLET ORAL at 05:04

## 2019-01-10 RX ADMIN — SENNOSIDES AND DOCUSATE SODIUM 1 TABLET: 8.6; 5 TABLET ORAL at 09:24

## 2019-01-10 RX ADMIN — PIPERACILLIN SODIUM AND TAZOBACTAM SODIUM 4.5 G: 4; .5 INJECTION, POWDER, LYOPHILIZED, FOR SOLUTION INTRAVENOUS at 17:57

## 2019-01-10 RX ADMIN — HEPARIN SODIUM 5000 UNITS: 5000 INJECTION INTRAVENOUS; SUBCUTANEOUS at 21:11

## 2019-01-10 RX ADMIN — PIPERACILLIN SODIUM AND TAZOBACTAM SODIUM 4.5 G: 4; .5 INJECTION, POWDER, LYOPHILIZED, FOR SOLUTION INTRAVENOUS at 23:27

## 2019-01-10 RX ADMIN — SODIUM CHLORIDE 125 ML/HR: 900 INJECTION, SOLUTION INTRAVENOUS at 13:37

## 2019-01-10 RX ADMIN — HEPARIN SODIUM 5000 UNITS: 5000 INJECTION INTRAVENOUS; SUBCUTANEOUS at 13:43

## 2019-01-10 RX ADMIN — Medication 10 ML: at 13:28

## 2019-01-10 RX ADMIN — Medication 1 CAPSULE: at 09:24

## 2019-01-10 RX ADMIN — ATORVASTATIN CALCIUM 10 MG: 10 TABLET, FILM COATED ORAL at 21:11

## 2019-01-10 RX ADMIN — Medication 10 ML: at 05:03

## 2019-01-10 RX ADMIN — PIPERACILLIN SODIUM,TAZOBACTAM SODIUM 3.38 G: 3; .375 INJECTION, POWDER, FOR SOLUTION INTRAVENOUS at 05:03

## 2019-01-10 RX ADMIN — VANCOMYCIN HYDROCHLORIDE 1000 MG: 1 INJECTION, POWDER, LYOPHILIZED, FOR SOLUTION INTRAVENOUS at 09:23

## 2019-01-10 RX ADMIN — Medication 10 ML: at 21:11

## 2019-01-10 RX ADMIN — ACETAMINOPHEN 650 MG: 325 TABLET ORAL at 12:40

## 2019-01-10 RX ADMIN — HEPARIN SODIUM 5000 UNITS: 5000 INJECTION INTRAVENOUS; SUBCUTANEOUS at 05:05

## 2019-01-10 RX ADMIN — VANCOMYCIN HYDROCHLORIDE 1000 MG: 1 INJECTION, POWDER, LYOPHILIZED, FOR SOLUTION INTRAVENOUS at 16:32

## 2019-01-10 RX ADMIN — OXYCODONE HYDROCHLORIDE 5 MG: 5 TABLET ORAL at 20:13

## 2019-01-10 RX ADMIN — TBO-FILGRASTIM 300 MCG: 300 INJECTION, SOLUTION SUBCUTANEOUS at 09:47

## 2019-01-10 RX ADMIN — PIPERACILLIN SODIUM AND TAZOBACTAM SODIUM 4.5 G: 4; .5 INJECTION, POWDER, LYOPHILIZED, FOR SOLUTION INTRAVENOUS at 13:22

## 2019-01-10 RX ADMIN — SODIUM CHLORIDE 100 ML/HR: 900 INJECTION, SOLUTION INTRAVENOUS at 20:06

## 2019-01-10 RX ADMIN — VANCOMYCIN HYDROCHLORIDE 1000 MG: 1 INJECTION, POWDER, LYOPHILIZED, FOR SOLUTION INTRAVENOUS at 01:29

## 2019-01-10 NOTE — CONSULTS
[facility] [place] [facility] [place] [address] [address] [facility]  Darylene Mems: [phone]  F: [phone]      Reason for Visit:   Wing De Leon is a 41 y.o. male who is seen in consultation at the request of Dr. Jeannie Vee for evaluation of dental abscess    Hematology / Oncology Treatment History:     Diagnosis: Follicular lymphoma     Stage: IV     Pathology:   11/13/18 right inguinal LN excision: Follicular lymphoma, high-grade (grade 3a of 3). Comment   The delaney architecture is entirely effaced by atypical lymphocytic proliferation with prominent nodular growth pattern. The majority of the atypical lymphocytes are small to medium in size and have irregular nuclear outlines and inconspicuous nucleoli, morphologically consistent with centrocytes. Scattered large lymphocytes with prominent nucleoli, consistent with centroblasts are identified (> 15 per high-power field). Focal increase in mitotic figures is noted. Occasional follicular dendritic cells are seen. By immunohistochemistry, the atypical lymphocytes are positive for CD20, PAX5, CD10, BCL6 and BCL2 (weak, focal) and negative for MUM1. CD3, CD5 and CD43 stain numerous background T lymphocytes. Ki-67 reveals a high proliferation index in the neoplastic follicles (overall 57-81%). Clinical history indicates 14 cm retroperitoneal mass with bilateral inguinal lymphadenopathy. In summary, the combined morphologic and phenotypic findings are diagnostic of a high-grade follicular lymphoma (grade 3a of 3). There is no evidence of diffuse large B-cell lymphoma. Flow cytometry analysis:   Monoclonal B-cell population (47 % of all cells) with mild increase in side and forward scatter properties expressing CD19, CD20, CD23 and CD10 with surface lambda light chain restriction. No phenotypically aberrant T-cell population.    Flow cytometry was performed at [facility]      Prior Treatment: None     Current Treatment: Obinutuzumab-CHOP. Obinutuzumab: 1000 mg weekly on days 1, 8, 15 for cycle 1, then 1000 mg on day 1 q21 days for cycles 2-6, then monotherapy 1000 mg every 21 days for cycle 7, 8 with Cyclophosphamide 750mg/m2, Doxorubicin 50mg/m2, Vincristine   1.4mg/m2 on day 1 and Prednisone 100mg on Days 1-5, every 21 days for a total of 6 cycles. Oncologic History:  He states he was in his usual state of health in early November 2018, but then he developed low back pain and presented to the ER. The pain was described as constant, radiating to the buttocks, without exacerbating or alleviating factors, associated w/ increased urination, no n/v/d, no dysuria, no fever, he's unsure about weight loss. Work-up at outside hospital revealed a retroperitoneal mass seen on CT imaging, and he was transferred to [facility] for further work-up. CT there showed a large retroperitoneal mass encircling the aorta with invasion of the left renal hilum and left adrenal gland.  There were bilateral inguinal lymph nodes and moderate left hydronephrosis. He was evaluated while at [facility] and was noted to have palpable nodes in his groin for approximately the past 1 month. No testicular mass, anorexia, weight loss, fevers, night sweats, chest pain, shortness of breath, abdominal pain or nausea. He underwent excisional LN biopsy of right inguinal LN. He was told that he had likely lymphoma. He was advised to follow up as outpatient for PET scan, bone marrow biopsy. However, patient states he was lost to f/u. He never received any calls or appointment details. His aunt urged patient to seek care elsewhere and his PCP recommended [facility]. At our first meeting on 12/13/18, he tells me that he was working full time, feeling well until early Nov 2018. When he developed the left lower back pain, he went to [facility] and [facility] [place]. No fevers, chills, night sweats. He has lost 15 lbs in the past month due to low appetite. No n/v/d.  No current constipation. No CP, SOB. No h/o cardiac disease aside from HTN, Hyperlipiemia. No hematochezia/melena, hematuria. He has HTN and XOL, which he was taking meds for, but has run out. Has no PCP currently. He is uninsured. He works as a cook, currently working part time. He has children aged 12 and 13. History of Present Illness:   Mr. Brandi Guerra is a 42 y/o male with HTN with Follicular lymphoma on chemotherapy treatment, referred to ED for neutropenic fever. On 1/9/19, he was seen in our office after complaining of mouth and dental pain. He was noted to have very poor dentition, erythema of gums and swelling of left jaw, right cheek. He was sent to our infusion room for IVF, blood cultures and a dose of Zosyn vs Cefepime. During the IVFs, patient developed fever to 102 and was referred to ED for admission. Labs notable for [address] of 0. He was started on Vanc and Zosyn. Maxillofacial CT is negative for abscess. He has pain in lower left jaw and progressing to rt upper jaw; described as throbbing and sharp. Taking tylenol at home. Remains in ED pending bed placement. He reports feeling slightly better after starting on IV antibiotics. Has had problems with teeth in the past.  No family at bedside. Past Medical History:   Diagnosis Date    Anxiety     Hyperlipidemia     Hypertension     Lymphadenopathy 11/12/2018      History reviewed. No pertinent surgical history.    Social History     Tobacco Use    Smoking status: Current Every Day Smoker     Packs/day: 1.00     Years: 20.00     Pack years: 20.00    Smokeless tobacco: Never Used   Substance Use Topics    Alcohol use: No     Frequency: Never      Family History   Problem Relation Age of Onset    Hypertension Father     Diabetes Mother      Current Facility-Administered Medications   Medication Dose Route Frequency    vancomycin (VANCOCIN) 1,000 mg in 0.9% sodium chloride (MBP/ADV) 250 mL  1,000 mg IntraVENous Q8H    piperacillin-tazobactam (ZOSYN) 3.375 g in 0.9% sodium chloride (MBP/ADV) 100 mL  3.375 g IntraVENous Q8H    sodium chloride (NS) flush 5-40 mL  5-40 mL IntraVENous Q8H    sodium chloride (NS) flush 5-40 mL  5-40 mL IntraVENous PRN    acetaminophen (TYLENOL) tablet 650 mg  650 mg Oral Q6H PRN    naloxone (NARCAN) injection 0.4 mg  0.4 mg IntraVENous PRN    Vancomycin- Level at 430pm, 1/10/19   Other ONCE    0.9% sodium chloride infusion  125 mL/hr IntraVENous CONTINUOUS    nicotine (NICODERM CQ) 21 mg/24 hr patch 1 Patch  1 Patch TransDERmal Q24H    lactobac ac& pc-s.therm-b.anim (AUSTIN Q/RISAQUAD)  1 Cap Oral DAILY    LORazepam (ATIVAN) tablet 0.5 mg  0.5 mg Oral Q8H PRN    oxyCODONE IR (ROXICODONE) tablet 5 mg  5 mg Oral Q6H PRN    senna-docusate (PERICOLACE) 8.6-50 mg per tablet 1 Tab  1 Tab Oral DAILY    heparin (porcine) injection 5,000 Units  5,000 Units SubCUTAneous Q8H    atorvastatin (LIPITOR) tablet 10 mg  10 mg Oral QHS     Current Outpatient Medications   Medication Sig    magic mouthwash solution Take 15-30 mL by mouth four (4) times daily. Magic mouth wash   Maalox  Lidocaine 2% viscous   Diphenhydramine oral solution    Swish and spit for mouth pain, ok to swallow for throat pain     Pharmacy to mix equal portions of ingredients to a total volume as indicated in the dispense amount.  oxyCODONE IR (ROXICODONE) 5 mg immediate release tablet Take 5 mg by mouth every six (6) hours as needed for Pain.  atorvastatin (LIPITOR) 10 mg tablet Take 10 mg by mouth nightly.  prochlorperazine (COMPAZINE) 10 mg tablet Take 1 Tab by mouth every six (6) hours as needed.  LORazepam (ATIVAN) 0.5 mg tablet Take 1 Tab by mouth every eight (8) hours as needed. Max Daily Amount: 1.5 mg.    hydroCHLOROthiazide (HYDRODIURIL) 12.5 mg tablet Take 1 Tab by mouth daily.  lisinopril (PRINIVIL, ZESTRIL) 10 mg tablet Take 1 Tab by mouth daily.  senna-docusate (PERICOLACE) 8.6-50 mg per tablet Take 1 Tab by mouth daily.     ondansetron hcl (ZOFRAN) 8 mg tablet Take 1 Tab by mouth every eight (8) hours as needed for Nausea.  predniSONE (DELTASONE) 20 mg tablet Take 100 mg by mouth daily (with breakfast). (on days 1-5 of each chemo cycle)    naloxone (NARCAN) 4 mg/actuation nasal spray Use 1 spray intranasally, then discard. Repeat with new spray every 2 min as needed for opioid overdose symptoms, alternating nostrils.  aspirin delayed-release (ASPIR-81) 81 mg tablet Take 1 Tab by mouth daily. No Known Allergies     Review of Systems: A complete review of systems was obtained, negative except as described above. Physical Exam:     Visit Vitals  /87 (BP 1 Location: Left arm, BP Patient Position: At rest;Supine)   Pulse (!) 106   Temp 99.8 °F (37.7 °C)   Resp 20   Ht 5' 6\" (1.676 m)   Wt 65.8 kg (145 lb)   SpO2 100%   BMI 23.40 kg/m²     ECOG PS: 2  General: No distress  Eyes: PERRLA, anicteric sclerae  HENT: poor dentition noted/ missing teeth/  Atraumatic with normal appearance of ears and nose; Some facial swelling noted at left lower jaw and right mid cheek. Neck: Supple; no thyromegaly   Lymphatic: Rt cervical node; no supraclavicular, or axillary adenopathy  Respiratory: CTAB, normal respiratory effort  CV: Normal rate, regular rhythm, no murmurs, no peripheral edema  GI: Soft, nontender, nondistended, no masses, no hepatomegaly, no splenomegaly  MS: Digits without clubbing or cyanosis. Skin: No rashes, ecchymoses, or petechiae. Normal temperature, turgor, and texture. Neuro/Psych: Alert, oriented, appropriate affect, normal judgment/insight      Results:     Lab Results   Component Value Date/Time    WBC 1.1 (L) 01/10/2019 01:24 AM    HGB 9.6 (L) 01/10/2019 01:24 AM    HCT 28.4 (L) 01/10/2019 01:24 AM    PLATELET 360 (L) 44/27/9148 01:24 AM    MCV 88.2 01/10/2019 01:24 AM    ABS.  NEUTROPHILS 0.0 (L) 01/10/2019 01:24 AM    Hemoglobin (POC) 15.0 06/05/2009 02:13 PM    Hematocrit (POC) 44 06/05/2009 02:13 PM     Lab Results Component Value Date/Time    Sodium 138 01/10/2019 01:24 AM    Potassium 3.6 01/10/2019 01:24 AM    Chloride 106 01/10/2019 01:24 AM    CO2 22 01/10/2019 01:24 AM    Glucose 119 (H) 01/10/2019 01:24 AM    BUN 6 01/10/2019 01:24 AM    Creatinine 1.05 01/10/2019 01:24 AM    GFR est AA >60 01/10/2019 01:24 AM    GFR est non-AA >60 01/10/2019 01:24 AM    Calcium 7.9 (L) 01/10/2019 01:24 AM    Sodium (POC) 139 06/05/2009 02:13 PM    Potassium (POC) 3.9 06/05/2009 02:13 PM    Chloride (POC) 103 06/05/2009 02:13 PM    Glucose (POC) 98 06/05/2009 02:13 PM    BUN (POC) 9 06/05/2009 02:13 PM    Creatinine (POC) 1.0 06/05/2009 02:13 PM    Calcium, ionized (POC) 1.21 06/05/2009 02:13 PM     Lab Results   Component Value Date/Time    Bilirubin, total 0.3 01/10/2019 01:24 AM    ALT (SGPT) 38 01/10/2019 01:24 AM    AST (SGOT) 14 (L) 01/10/2019 01:24 AM    Alk. phosphatase 73 01/10/2019 01:24 AM    Protein, total 5.4 (L) 01/10/2019 01:24 AM    Albumin 2.0 (L) 01/10/2019 01:24 AM    Globulin 3.4 01/10/2019 01:24 AM       Lab Results   Component Value Date/Time     12/28/2018 10:00 AM    Beta-2 Microglobulin, serum 2.5 (H) 12/28/2018 10:00 AM    TSH 1.53 09/28/2016 04:40 AM    Lipase 198 11/09/2018 09:49 PM    Hep C  virus Ab Interp. NONREACTIVE 12/27/2018 04:53 PM     Lab Results   Component Value Date/Time    INR 1.0 11/09/2018 09:49 PM    aPTT 26.1 07/30/2018 07:46 AM     Lab Results   Component Value Date/Time     12/28/2018 10:00 AM    HCG, beta, QT <1 11/10/2018 03:41 AM    AFP, Serum, Tumor Marker 4.1 11/10/2018 03:41 AM     1/09/2019 XR CHEST  IMPRESSION:  1. No radiographic evidence of acute cardiopulmonary disease. 1/09/2019 CT MAXILLOFACIAL WO CONT  IMPRESSION:   1. Extensive periodontal disease and dental caries of the maxillary and  mandibular teeth.  Ill-defined soft tissue measuring 2.2 x 1.1 cm in the right  buccal gingiva adjacent to periapical lucency of the root of tooth number 27,  with associated erosion of the buccal cortex of the mandible. This is favored to  represent phlegmon, with no definite organized fluid collection identified,  although evaluation limited by lack of IV contrast. There is overlying extensive  cellulitis of the premandibular and submandibular soft tissues, right worse than  left, as well as enlarged bilateral reactive level 1B lymph nodes, largest on  the right measuring 12 mm. Assessment and Recommendations:   40 yo male currently undergoing tx for follicualr lymphoma admitted with dental caries and neutropenic fever    1. Follicular lymphoma: Grade 3a. Although grade 3a disease is considered more indolent and can be treated like grade 1/2 disease, this patient has indications for treatment: bulky disease encircling the aorta causing symptoms. Bone marrow negative for lymphoma, but was hypercellular. BR better than RCHOP, but based on GALLIUM study, Obinutuzumab-based induction and maintenance prolongs PFS over that seen with rituximab-based therapy. Current tx with  O-CHOP regimen for 6 cycles, followed by possible maintenance Obinutuzumab. Received  Cycle 1,  Day 8 of O-CHOP regimen on 1/03/2019.   -- Holding C1D15 treatment today given neutropenic fever and sepsis  -- Will continue to follow    2. Neutropenic fever: 2/2 dental infection. No abscess. Blood cultures negative. Appreciate ID and ENT evaluation. -- Continue on Zosyn. Vancomycin will likely be d/c'd given negative cultures  -- Granix initiated started today, 1/10 x 3 days. -- Daily CBC with diff, BMP.  -- Will need dental evaluation vs OMFS after hospital discharge. But pt will likely be unable to afford this. 3. Normocytic: 2/2 chemotherapy. -- Continue to monitor and transfuse for Hgb < 7    4. Thrombocytopenia: 2/2 to treatment and infection    5. Neoplasm related pain / Anxiety: Left lower back pain. Takes Oxycodone and Ativan at home  -- Continue home meds and monitor    6.  HTN / Hyperlipidemia: Management per hospitalist    Patient seen in conjunction with Alphonsus Prader, NP.     Signed By: Marianela Freedman MD

## 2019-01-10 NOTE — TELEPHONE ENCOUNTER
[address] at [place]  [phone]      01/10/19 8:55 AM Received call from [employer]. Provided diagnosis code for [place] (R11.0) per recent office note. Representative had additional questions regarding patient's income and household size. Advised our financial counselor may be able to assist in answering this question. Obtained contact phone number to return call. Akbar Velasquez [id number], case #[id number].

## 2019-01-10 NOTE — PROGRESS NOTES
Patient transported to [facility] and handed off to [place], [place] w/o difficulties.

## 2019-01-10 NOTE — PROGRESS NOTES
Admission Medication Reconciliation:    Comments/Recommendations:  -Medication history obtained in the emergency department (room 16)  -Confirmed no known drug allergies and confirmed patient's preferred pharmacy  -Stewart carreon due to patient lethargy/drowsiness. Unable to accurately determine timing of last doses for many of below medications. Medications added: Atorvastatin  Medications removed: Simvastatin  Medications changed: None    Information obtained from: Patient and review of RxQuery and review of chart    Significant PMH/Disease States:   Past Medical History:   Diagnosis Date    Anxiety     Hyperlipidemia     Hypertension     Lymphadenopathy 11/12/2018       Chief Complaint for this Admission:    Chief Complaint   Patient presents with    Fever    Gum Problem       Allergies:  Patient has no known allergies. Prior to Admission Medications:   Prior to Admission Medications   Prescriptions Last Dose Informant Patient Reported? Taking? LORazepam (ATIVAN) 0.5 mg tablet 1/9/2019 at AM  No Yes   Sig: Take 1 Tab by mouth every eight (8) hours as needed. Max Daily Amount: 1.5 mg.   aspirin delayed-release (ASPIR-81) 81 mg tablet 1/9/2019 at AM  No Yes   Sig: Take 1 Tab by mouth daily. atorvastatin (LIPITOR) 10 mg tablet 1/8/2019 at bedtime  Yes Yes   Sig: Take 10 mg by mouth nightly. hydroCHLOROthiazide (HYDRODIURIL) 12.5 mg tablet 1/9/2019 at AM  No Yes   Sig: Take 1 Tab by mouth daily. lisinopril (PRINIVIL, ZESTRIL) 10 mg tablet 1/9/2019 at AM  No Yes   Sig: Take 1 Tab by mouth daily. magic mouthwash solution 1/8/2019 at Unknown time  No Yes   Sig: Take 15-30 mL by mouth four (4) times daily. Magic mouth wash   Maalox  Lidocaine 2% viscous   Diphenhydramine oral solution    Swish and spit for mouth pain, ok to swallow for throat pain     Pharmacy to mix equal portions of ingredients to a total volume as indicated in the dispense amount.    naloxone (NARCAN) 4 mg/actuation nasal spray No Yes   Sig: Use 1 spray intranasally, then discard. Repeat with new spray every 2 min as needed for opioid overdose symptoms, alternating nostrils. ondansetron hcl (ZOFRAN) 8 mg tablet 1/9/2019 at AM  No Yes   Sig: Take 1 Tab by mouth every eight (8) hours as needed for Nausea. oxyCODONE IR (ROXICODONE) 5 mg immediate release tablet 1/9/2019 at AM  Yes Yes   Sig: Take 5 mg by mouth every six (6) hours as needed for Pain. predniSONE (DELTASONE) 20 mg tablet   No Yes   Sig: Take 100 mg by mouth daily (with breakfast). (on days 1-5 of each chemo cycle)   prochlorperazine (COMPAZINE) 10 mg tablet 1/9/2019 at Unknown time  No Yes   Sig: Take 1 Tab by mouth every six (6) hours as needed. senna-docusate (PERICOLACE) 8.6-50 mg per tablet 1/9/2019 at Unknown time  No Yes   Sig: Take 1 Tab by mouth daily.       Facility-Administered Medications: None       Thank you,  Jorge Grimm, PHARMD

## 2019-01-10 NOTE — PROGRESS NOTES
Bedside and Verbal shift change report given to Jyoit Carranza RN (oncoming nurse) by Kenyetta Pederson RN (offgoing nurse). Report included the following information SBAR and Kardex.

## 2019-01-10 NOTE — CONSULTS
Otolaryngology - Head & Neck Surgery Consult        PATIENT NAME: Omra Griffin  MRN: [id number]  DATE: 1/10/2019  ADMISSION DATE: 1/9/2019      Subjective:     Asked to evaluate for likely odontogenic infection in the setting of neutropenia. Diagnosed with follicular lymphoma, started on chemotherapy (CHOP) on 1/3. Developed oral/gum pain 1-2 days ago as well as some facial swelling. Symptoms bilateral but worse on right side. Fever developed yesterday. Has neutropenia due to start on chemotherapy. Known dental disease, does not see dentist. Started on vanc and pperacillin-tazobactam yesterday. Patient feels that facial swelling has improved since last evening. Objective:     Visit Vitals  /86 (BP 1 Location: Left arm, BP Patient Position: At rest)   Pulse (!) 103   Temp (!) 100.7 °F (38.2 °C)   Resp 25   Ht 5' 6\" (1.676 m)   Wt 65.8 kg (145 lb)   SpO2 100%   BMI 23.40 kg/m²     01/08 1901 - 01/10 0700  In: 2656.7 [P.O.:240; I.V.:2416.7]  Out: 150 [Urine:150]    Physical Exam:     General - lying semi-supine in bed, NAD, alert and oriented. Head & face - minimal edema around right lower face and left mid-face, no cellulitis. No induration or fluctuant mass. No skin lesions. Nose - clear anteriorly. No visible edema or pus. Oral - very poor dentition with multiple fractured teeth. Ulceration with possible exposed bone left upper gingiva (buccal aspect) but no purulent drainage or obvious abscess. Right lower gingiva tender but again no obvious drainage or abscess. Neck - small shotty anterior nodes, minimal tenderness. Normal submandibular and parotid glands. No thyroid mass. No cellulitis or edema. CN 5 and 7 intact. CT MAXILLOFACIAL (W/O CONTRAST): inflammatory changes bilateral lower face but most pronounced on right along inferior aspect of mandible with likely phlegmon adjacent to #27. Multiple carries. Assessment:     Odontogenic infection in the setting of neutropenia. No clearly defined drainable fluid collection. Started on vanc and Zosyn yesterday, awaiting blood cultures. Subjective improvement since last evening. Plan:     1. Continue Zosyn, Ok to stop vanc if blood cultures negative. 2. Will follow. No indication for surgical intervention at current time. 3. He will need to see dentistry or OMFS as outpatient as he's at clear risk for recurrence if underlying dental pathology not addressed.      Negro Powers MD   [phone] - Office   [phone] - Cell

## 2019-01-10 NOTE — ED NOTES
Bedside and Verbal shift change report given to Belgica Ward (oncoming nurse) by Rosebud Schirmer (offgoing nurse). Report included the following information SBAR, Kardex and ED Summary.

## 2019-01-10 NOTE — CONSULTS
[address]  MR#: [id number]  : 1975  ACCOUNT #: [de-identified]   DATE OF SERVICE: 01/10/2019    REQUESTING PHYSICIAN:  Dr. Hitesh De La Torre:  Fever and left jaw pain. HISTORY OF PRESENT ILLNESS:  The patient is a 30-year-old male with history of recently diagnosed follicular lymphoma, currently on chemotherapy, who was admitted to [facility] on  with the aforementioned complaint. Patient just started chemotherapy with obinutuzumab-CHOP on 2019. Has done well up until several days ago when he developed progressive right-sided facial pain. History of poor dentition with several broken teeth. Workup in the emergency department revealed the patient to be febrile with a temperature of 102.6 degrees Fahrenheit and an absolute neutrophil count of 0. He has been started on vancomycin and piperacillin/tazobactam and the infectious disease service has been asked to assist with antibiotic management. PAST MEDICAL HISTORY:  Recently diagnosed follicular lymphoma. He just started his first cycle of chemotherapy with obinutuzumab-CHOP on 2019, anxiety, dyslipidemia, hypertension. PAST SURGICAL HISTORY:  Right chest wall port placement. SOCIAL HISTORY:  He is a current tobacco smoker. No alcohol or illicit drug use. FAMILY HISTORY:  Hypertension and diabetes. ALLERGIES:  NO KNOWN ALLERGIES. OUTPATIENT MEDICATIONS:  Please see the body of chart for details. He was just started on Augmentin prior to admission. REVIEW OF SYSTEMS:  As per HPI. Remainder of 12 review of systems unremarkable. LABORATORY DATA:  White blood cell count is 1.1, platelet count 129,448, absolute neutrophil count is 0. Urinalysis negative for leukocyte esterase, 0-4 white cells per high power field. Creatinine is 1.05. Blood cultures are pending.   Maxillofacial CT scan reveals extensive periodontal disease and dental carries of the maxillary and mandibular teeth. There is no definite abscess seen. PHYSICAL EXAMINATION:  VITAL SIGNS:  Maximum temperature is 102.6 degrees Fahrenheit, current temperature is 100.7, heart rate 103 beats per minute, respiratory rate 25, oxygen saturation 100% on room air. GENERAL:  Alert, no acute distress. HEENT:  There is some swelling of the left cheek with pain to palpation over the left upper molars. There is poor dentition with several missing teeth. No thrush noted. Cervical lymphadenopathy. CARDIOVASCULAR:  Regular rate and rhythm. No murmurs, gallops or rubs. LUNGS:  Clear to auscultation bilaterally. ABDOMEN:  Soft, nontender, nondistended. EXTREMITIES:  No clubbing, cyanosis or edema. SKIN:  No rashes. There is a right chest wall port in place. PSYCHIATRIC:  Normal affect. ASSESSMENT AND PLAN:  1. Febrile neutropenia. I suspect this is due to a dental infection in the setting of poor dentition and recent chemotherapy. Agree with piperacillin-tazobactam but will increase the dose to 4.5 grams IV q. 6 for neutropenic fever. Continue vancomycin but will stop once blood cultures return as long as they remain sterile. Maxillofacial CT scan is negative for abscess. ENT has been consulted. Ideally, the patient will see an oral surgeon; however, I am not sure this can be arranged at [address] [place] [address]. Patient has been seen by Oncology and started on Granix for the neutropenia. Further recommendations pending results of above. 2.  Recently diagnosed follicular lymphoma. The patient is on treatment with obinutuzumab-CHOP. Thank you for allowing me to participate in the care of this patient.       DO GABY [place] / [place]  D: 01/10/2019 12:30     T: 01/10/2019 12:47  JOB #: [id number]

## 2019-01-10 NOTE — PROGRESS NOTES
TRANSFER - IN REPORT:    Verbal report received from Maranda(name) on Odilia Kaur  being received from ED(unit) for routine progression of care      Report consisted of patients Situation, Background, Assessment and   Recommendations(SBAR). Information from the following report(s) SBAR, Kardex, Intake/Output, MAR, Recent Results and Med Rec Status was reviewed with the receiving nurse. Opportunity for questions and clarification was provided. Written report provided to primary RN assigned Tracy Dupree RN.

## 2019-01-10 NOTE — PROGRESS NOTES
Darius Manzano [facility]  [address]  [phone]      Medical Progress Note      NAME: Naga Wolfe   :  1975  MRM:  [id number]    Date/Time: 1/10/2019        Assessment / Plan:     41 y.o. male with hx of follicular lymphoma on chemo presents with dental pain/carries and neutropenic fever.        Sepsis / Neutropenic fever: from odontogenic infection/phlegmon noted on CT. No drainable abscess. No oral surgeon here at [facility], appreciate ENT eval. Continue Zosyn and vanc pending blood cultures. ID following. Watch renal function. Continue IVF.        Neutropenia / thrombocytopenia / anemia / pancytopenia: due to chemo. Watch CBC with diff. Started on G-CSF. Follow CBC with diff       HTN (hypertension): hold all antihypertensives due to sepsis. Watch hemodynamics.        Tobacco abuse: smokes ~one ppd. Advised smoking cessation. Started nicotine patch       Hyperlipidemia (): continue statin       Follicular lymphoma ([facility] [address]): on chemo. Followed by Dr. Mary Calderon.        Anxiety: continue Ativan PRN       Total time spent:25 minutes  Time spent in the care of this patient including reviewing records, discussing with nursing and/or other providers on the treatment team, obtaining history and examining the patient, and discussing treatment plans. Care Plan discussed with: Patient, Nursing Staff and >50% of time spent in counseling and coordination of care    Discussed:  Care Plan    Prophylaxis:  Hep SQ    Disposition:  Home w/Family         Subjective:     Chief Complaint:  Follow up neutropenic fever    Chart/notes/labs/studies reviewed, patient examined at bedside. Feels a little better. Still febrile. Still having dental pain          Objective:       Vitals:        Last 24hrs VS reviewed since prior progress note.  Most recent are:    Visit Vitals  /78 (BP 1 Location: Left arm, BP Patient Position: At rest)   Pulse (!) 101   Temp 99.9 °F (37.7 °C)   Resp 22   Ht 5' 6\" (1.676 m)   Wt 65.8 kg (145 lb)   SpO2 99%   BMI 23.40 kg/m²     SpO2 Readings from Last 6 Encounters:   01/10/19 99%   01/09/19 98%   01/09/19 98%   01/03/19 97%   12/28/18 100%   12/28/18 100%            Intake/Output Summary (Last 24 hours) at 1/10/2019 1714  Last data filed at 1/10/2019 1553  Gross per 24 hour   Intake 4400.42 ml   Output 750 ml   Net 3650.42 ml          Exam:     Physical Exam:    Gen: chronically ill-appearing, in no acute distress but obviously uncomfortable appearing  HEENT:  No scleral icterus, hearing intact to voice, dry mucous membranes with dental carries, very poor dentition. No thrush or exudates noted today. Minimal facial/jaw swelling  Neck:  Supple, no nuchal rigidity  Resp:  No accessory muscle use. CTAB without wheezing, rales, rhonchi  Card: tachycardic, reg rhythm. Normal S1 and S2 without murmurs, rubs, or gallops. No peripheral lower extremity edema. No JVD. Peripheral pulses in tact. Abd:  Normoactive bowel sounds. Soft, non-tender, non-distended. No rebound, no guarding. No appreciable hepatosplenomegaly   Lymph:  +cervical adenopathy  Musc:  No cyanosis or clubbing  Skin:  No rashes or ulcers; turgor intact. Neuro:  Cranial nerves are grossly intact, no focal motor weakness, follows commands appropriately  Psych:  Good insight, normal affect. Alert, oriented x 3.  Answers questions appropriately         Medications Reviewed: (see below)    Lab Data Reviewed: (see below)    ______________________________________________________________________    Medications:     Current Facility-Administered Medications   Medication Dose Route Frequency    tbo-filgrastim (GRANIX) injection 300 mcg  300 mcg SubCUTAneous DAILY    piperacillin-tazobactam (ZOSYN) 4.5 g in 0.9% sodium chloride (MBP/ADV) 100 mL  4.5 g IntraVENous Q6H    vancomycin (VANCOCIN) 1,000 mg in 0.9% sodium chloride (MBP/ADV) 250 mL  1,000 mg IntraVENous Q8H    sodium chloride (NS) flush 5-40 mL  5-40 mL IntraVENous Q8H    sodium chloride (NS) flush 5-40 mL  5-40 mL IntraVENous PRN    acetaminophen (TYLENOL) tablet 650 mg  650 mg Oral Q6H PRN    naloxone (NARCAN) injection 0.4 mg  0.4 mg IntraVENous PRN    0.9% sodium chloride infusion  125 mL/hr IntraVENous CONTINUOUS    nicotine (NICODERM CQ) 21 mg/24 hr patch 1 Patch  1 Patch TransDERmal Q24H    lactobac ac& pc-s.therm-b.anim (AUSTIN Q/RISAQUAD)  1 Cap Oral DAILY    LORazepam (ATIVAN) tablet 0.5 mg  0.5 mg Oral Q8H PRN    oxyCODONE IR (ROXICODONE) tablet 5 mg  5 mg Oral Q6H PRN    senna-docusate (PERICOLACE) 8.6-50 mg per tablet 1 Tab  1 Tab Oral DAILY    heparin (porcine) injection 5,000 Units  5,000 Units SubCUTAneous Q8H    atorvastatin (LIPITOR) tablet 10 mg  10 mg Oral QHS     Current Outpatient Medications   Medication Sig    magic mouthwash solution Take 15-30 mL by mouth four (4) times daily. Magic mouth wash   Maalox  Lidocaine 2% viscous   Diphenhydramine oral solution    Swish and spit for mouth pain, ok to swallow for throat pain     Pharmacy to mix equal portions of ingredients to a total volume as indicated in the dispense amount.  oxyCODONE IR (ROXICODONE) 5 mg immediate release tablet Take 5 mg by mouth every six (6) hours as needed for Pain.  atorvastatin (LIPITOR) 10 mg tablet Take 10 mg by mouth nightly.  prochlorperazine (COMPAZINE) 10 mg tablet Take 1 Tab by mouth every six (6) hours as needed.  LORazepam (ATIVAN) 0.5 mg tablet Take 1 Tab by mouth every eight (8) hours as needed. Max Daily Amount: 1.5 mg.    hydroCHLOROthiazide (HYDRODIURIL) 12.5 mg tablet Take 1 Tab by mouth daily.  lisinopril (PRINIVIL, ZESTRIL) 10 mg tablet Take 1 Tab by mouth daily.  senna-docusate (PERICOLACE) 8.6-50 mg per tablet Take 1 Tab by mouth daily.  ondansetron hcl (ZOFRAN) 8 mg tablet Take 1 Tab by mouth every eight (8) hours as needed for Nausea.     predniSONE (DELTASONE) 20 mg tablet Take 100 mg by mouth daily (with breakfast). (on days 1-5 of each chemo cycle)    naloxone (NARCAN) 4 mg/actuation nasal spray Use 1 spray intranasally, then discard. Repeat with new spray every 2 min as needed for opioid overdose symptoms, alternating nostrils.  aspirin delayed-release (ASPIR-81) 81 mg tablet Take 1 Tab by mouth daily. Lab Review:     Recent Labs     01/10/19  0124 01/09/19  1209   WBC 1.1* 1.1*   HGB 9.6* 11.8*   HCT 28.4* 33.3*   * 99*     Recent Labs     01/10/19  0124 01/09/19  1209    132*   K 3.6 3.5    98   CO2 22 23   * 136*   BUN 6 7   CREA 1.05 1.16   CA 7.9* 8.8   ALB 2.0* 2.9*   SGOT 14* 17   ALT 38 42     No components found for: GLPOC  No results for input(s): PH, PCO2, PO2, HCO3, FIO2 in the last 72 hours. No results for input(s): INR in the last 72 hours.     No lab exists for component: INREXT  No results found for: SDES  Lab Results   Component Value Date/Time    Culture result: NO GROWTH AFTER 14 HOURS 01/09/2019 02:47 PM    Culture result: NO GROWTH AFTER 17 HOURS 01/09/2019 12:09 PM    Culture result: NO GROWTH 2 DAYS 01/22/2017 11:20 AM              ___________________________________________________    Attending Physician: Gilberto Carrera MD

## 2019-01-10 NOTE — ED NOTES
[facility] Charge rn aware of patient, primary RN will return call for report.

## 2019-01-10 NOTE — PROGRESS NOTES
Bedside shift change report given to Candy Anthony RN (oncoming nurse) by Alvaro Stanley RN (offgoing nurse). Report included the following information SBAR, Kardex and MAR.

## 2019-01-10 NOTE — PROGRESS NOTES
TRANSFER - OUT REPORT:    Verbal report given to Jessa Seth RN(name) on Bassam Ledezma  being transferred to [facility](unit) for routine progression of care       Report consisted of patients Situation, Background, Assessment and   Recommendations(SBAR). Information from the following report(s) SBAR, Kardex and MAR was reviewed with the receiving nurse. Lines:   Venous Access Device Bard PowerPort 12/20/18 Upper chest (subclavicular area, right (Active)   Central Line Being Utilized Yes 1/10/2019  3:53 PM   Criteria for Appropriate Use Irritant/vesicant medication 1/9/2019 11:24 PM   Site Assessment Clean, dry, & intact 1/9/2019 11:24 PM   Date of Last Dressing Change 01/09/19 1/10/2019  3:53 PM   Dressing Status Clean, dry, & intact 1/10/2019  3:53 PM   Dressing Type Transparent 1/10/2019  3:53 PM   Date Accessed (Medial Site) 01/09/19 1/9/2019 12:00 PM   Access Time (Medial Site) 1200 1/9/2019 12:00 PM   Access Needle Size (Site #1) 20 G 1/9/2019 12:00 PM   Access Needle Length (Medial Site) 0.75 inches 1/9/2019 12:00 PM   Positive Blood Return (Medial Site) Yes 1/9/2019 11:24 PM   Action Taken (Medial Site) Blood drawn;Flushed 1/9/2019 12:00 PM        Opportunity for questions and clarification was provided.

## 2019-01-10 NOTE — CDMP QUERY
Dr. Devin Segura: 
 
Current documentation in the medical record states \"Neutropenia / thrombocytopenia / anemia / thrombocytopenia: due to chemo. \" for this 40 yo male undergoing chemotherapy for follicular lymphoma. Based on your medical judgment, could your documentation be further specified as: 
 
=> Pancytopenia due to chemotherapy 
=> Other explanation of clinical findings  
=> Clinically Undetermined (no explanation for clinical findings) Please clarify and document your clinical opinion in the progress notes and discharge summary including the definitive and/or presumptive diagnosis, (suspected or probable), related to the above clinical findings. Please include clinical findings supporting your diagnosis.  
 
Aime Lance, [employer], [email or web address] [email or web address]

## 2019-01-10 NOTE — PROGRESS NOTES
Problem: Falls - Risk of  Goal: *Absence of Falls  Document Perla Fall Risk and appropriate interventions in the flowsheet.   Outcome: Progressing Towards Goal  Fall Risk Interventions:

## 2019-01-11 LAB
ALBUMIN SERPL-MCNC: 2 G/DL (ref 3.5–5)
ALBUMIN/GLOB SERPL: 0.6 {RATIO} (ref 1.1–2.2)
ALP SERPL-CCNC: 70 U/L (ref 45–117)
ALT SERPL-CCNC: 30 U/L (ref 12–78)
ANION GAP SERPL CALC-SCNC: 12 MMOL/L (ref 5–15)
AST SERPL-CCNC: 12 U/L (ref 15–37)
BASOPHILS # BLD: 0.1 K/UL (ref 0–0.1)
BASOPHILS NFR BLD: 3 % (ref 0–1)
BILIRUB SERPL-MCNC: 0.3 MG/DL (ref 0.2–1)
BUN SERPL-MCNC: 3 MG/DL (ref 6–20)
BUN/CREAT SERPL: 3 (ref 12–20)
CALCIUM SERPL-MCNC: 8.2 MG/DL (ref 8.5–10.1)
CHLORIDE SERPL-SCNC: 105 MMOL/L (ref 97–108)
CO2 SERPL-SCNC: 23 MMOL/L (ref 21–32)
CREAT SERPL-MCNC: 0.94 MG/DL (ref 0.7–1.3)
DIFFERENTIAL METHOD BLD: ABNORMAL
EOSINOPHIL # BLD: 0 K/UL (ref 0–0.4)
EOSINOPHIL NFR BLD: 1 % (ref 0–7)
ERYTHROCYTE [DISTWIDTH] IN BLOOD BY AUTOMATED COUNT: 13 % (ref 11.5–14.5)
GLOBULIN SER CALC-MCNC: 3.4 G/DL (ref 2–4)
GLUCOSE SERPL-MCNC: 98 MG/DL (ref 65–100)
HCT VFR BLD AUTO: 27.2 % (ref 36.6–50.3)
HGB BLD-MCNC: 9.2 G/DL (ref 12.1–17)
IMM GRANULOCYTES # BLD AUTO: 0 K/UL
IMM GRANULOCYTES NFR BLD AUTO: 0 %
LYMPHOCYTES # BLD: 1.4 K/UL (ref 0.8–3.5)
LYMPHOCYTES NFR BLD: 61 % (ref 12–49)
MAGNESIUM SERPL-MCNC: 1.5 MG/DL (ref 1.6–2.4)
MCH RBC QN AUTO: 30.2 PG (ref 26–34)
MCHC RBC AUTO-ENTMCNC: 33.8 G/DL (ref 30–36.5)
MCV RBC AUTO: 89.2 FL (ref 80–99)
MONOCYTES # BLD: 0.3 K/UL (ref 0–1)
MONOCYTES NFR BLD: 15 % (ref 5–13)
NEUTS SEG # BLD: 0.4 K/UL (ref 1.8–8)
NEUTS SEG NFR BLD: 20 % (ref 32–75)
NRBC # BLD: 0 K/UL (ref 0–0.01)
NRBC BLD-RTO: 0 PER 100 WBC
PHOSPHATE SERPL-MCNC: 2 MG/DL (ref 2.6–4.7)
PLATELET # BLD AUTO: 155 K/UL (ref 150–400)
PMV BLD AUTO: 10.8 FL (ref 8.9–12.9)
POTASSIUM SERPL-SCNC: 3.3 MMOL/L (ref 3.5–5.1)
PROT SERPL-MCNC: 5.4 G/DL (ref 6.4–8.2)
RBC # BLD AUTO: 3.05 M/UL (ref 4.1–5.7)
RBC MORPH BLD: ABNORMAL
SODIUM SERPL-SCNC: 140 MMOL/L (ref 136–145)
WBC # BLD AUTO: 2.2 K/UL (ref 4.1–11.1)

## 2019-01-11 PROCEDURE — 74011250636 HC RX REV CODE- 250/636: Performed by: INTERNAL MEDICINE

## 2019-01-11 PROCEDURE — 83735 ASSAY OF MAGNESIUM: CPT

## 2019-01-11 PROCEDURE — 36415 COLL VENOUS BLD VENIPUNCTURE: CPT

## 2019-01-11 PROCEDURE — 84100 ASSAY OF PHOSPHORUS: CPT

## 2019-01-11 PROCEDURE — 80053 COMPREHEN METABOLIC PANEL: CPT

## 2019-01-11 PROCEDURE — 74011000258 HC RX REV CODE- 258: Performed by: INTERNAL MEDICINE

## 2019-01-11 PROCEDURE — 74011250637 HC RX REV CODE- 250/637: Performed by: INTERNAL MEDICINE

## 2019-01-11 PROCEDURE — 65270000029 HC RM PRIVATE

## 2019-01-11 PROCEDURE — 85025 COMPLETE CBC W/AUTO DIFF WBC: CPT

## 2019-01-11 RX ORDER — ONDANSETRON 2 MG/ML
8 INJECTION INTRAMUSCULAR; INTRAVENOUS AS NEEDED
Status: CANCELLED | OUTPATIENT
Start: 2019-01-17

## 2019-01-11 RX ORDER — ALBUTEROL SULFATE 0.83 MG/ML
2.5 SOLUTION RESPIRATORY (INHALATION) AS NEEDED
Status: CANCELLED
Start: 2019-01-17

## 2019-01-11 RX ORDER — ACETAMINOPHEN 325 MG/1
650 TABLET ORAL AS NEEDED
Status: CANCELLED
Start: 2019-01-17

## 2019-01-11 RX ORDER — EPINEPHRINE 1 MG/ML
0.3 INJECTION, SOLUTION, CONCENTRATE INTRAVENOUS AS NEEDED
Status: CANCELLED | OUTPATIENT
Start: 2019-01-17

## 2019-01-11 RX ORDER — OXYCODONE HYDROCHLORIDE 5 MG/1
5 TABLET ORAL
Status: DISCONTINUED | OUTPATIENT
Start: 2019-01-11 | End: 2019-01-12 | Stop reason: HOSPADM

## 2019-01-11 RX ORDER — SODIUM CHLORIDE 0.9 % (FLUSH) 0.9 %
10 SYRINGE (ML) INJECTION AS NEEDED
Status: CANCELLED
Start: 2019-01-17

## 2019-01-11 RX ORDER — DIPHENHYDRAMINE HYDROCHLORIDE 50 MG/ML
50 INJECTION, SOLUTION INTRAMUSCULAR; INTRAVENOUS AS NEEDED
Status: CANCELLED
Start: 2019-01-17

## 2019-01-11 RX ORDER — SODIUM CHLORIDE 9 MG/ML
100 INJECTION, SOLUTION INTRAVENOUS CONTINUOUS
Status: CANCELLED | OUTPATIENT
Start: 2019-01-17

## 2019-01-11 RX ORDER — HYDROCORTISONE SODIUM SUCCINATE 100 MG/2ML
100 INJECTION, POWDER, FOR SOLUTION INTRAMUSCULAR; INTRAVENOUS AS NEEDED
Status: CANCELLED | OUTPATIENT
Start: 2019-01-17

## 2019-01-11 RX ORDER — SODIUM CHLORIDE 9 MG/ML
25 INJECTION, SOLUTION INTRAVENOUS CONTINUOUS
Status: CANCELLED | OUTPATIENT
Start: 2019-01-17

## 2019-01-11 RX ORDER — HEPARIN 100 UNIT/ML
300-500 SYRINGE INTRAVENOUS AS NEEDED
Status: CANCELLED
Start: 2019-01-17

## 2019-01-11 RX ORDER — ACETAMINOPHEN 325 MG/1
650 TABLET ORAL ONCE
Status: CANCELLED
Start: 2019-01-17 | End: 2019-01-18

## 2019-01-11 RX ORDER — SODIUM,POTASSIUM PHOSPHATES 280-250MG
2 POWDER IN PACKET (EA) ORAL EVERY 4 HOURS
Status: COMPLETED | OUTPATIENT
Start: 2019-01-11 | End: 2019-01-11

## 2019-01-11 RX ORDER — DIPHENHYDRAMINE HYDROCHLORIDE 50 MG/ML
50 INJECTION, SOLUTION INTRAMUSCULAR; INTRAVENOUS ONCE
Status: CANCELLED
Start: 2019-01-17 | End: 2019-01-18

## 2019-01-11 RX ORDER — MAGNESIUM SULFATE HEPTAHYDRATE 40 MG/ML
2 INJECTION, SOLUTION INTRAVENOUS ONCE
Status: COMPLETED | OUTPATIENT
Start: 2019-01-11 | End: 2019-01-11

## 2019-01-11 RX ORDER — SODIUM CHLORIDE 9 MG/ML
10 INJECTION INTRAMUSCULAR; INTRAVENOUS; SUBCUTANEOUS AS NEEDED
Status: CANCELLED | OUTPATIENT
Start: 2019-01-17

## 2019-01-11 RX ADMIN — SODIUM CHLORIDE 100 ML/HR: 900 INJECTION, SOLUTION INTRAVENOUS at 08:54

## 2019-01-11 RX ADMIN — PIPERACILLIN SODIUM AND TAZOBACTAM SODIUM 4.5 G: 4; .5 INJECTION, POWDER, LYOPHILIZED, FOR SOLUTION INTRAVENOUS at 17:59

## 2019-01-11 RX ADMIN — OXYCODONE HYDROCHLORIDE 5 MG: 5 TABLET ORAL at 07:12

## 2019-01-11 RX ADMIN — POTASSIUM & SODIUM PHOSPHATES POWDER PACK 280-160-250 MG 2 PACKET: 280-160-250 PACK at 08:41

## 2019-01-11 RX ADMIN — ATORVASTATIN CALCIUM 10 MG: 10 TABLET, FILM COATED ORAL at 21:30

## 2019-01-11 RX ADMIN — VANCOMYCIN HYDROCHLORIDE 1250 MG: 10 INJECTION, POWDER, LYOPHILIZED, FOR SOLUTION INTRAVENOUS at 00:06

## 2019-01-11 RX ADMIN — HEPARIN SODIUM 5000 UNITS: 5000 INJECTION INTRAVENOUS; SUBCUTANEOUS at 07:07

## 2019-01-11 RX ADMIN — Medication 1 CAPSULE: at 08:41

## 2019-01-11 RX ADMIN — Medication 10 ML: at 07:09

## 2019-01-11 RX ADMIN — OXYCODONE HYDROCHLORIDE 5 MG: 5 TABLET ORAL at 21:30

## 2019-01-11 RX ADMIN — VANCOMYCIN HYDROCHLORIDE 1250 MG: 10 INJECTION, POWDER, LYOPHILIZED, FOR SOLUTION INTRAVENOUS at 08:53

## 2019-01-11 RX ADMIN — PIPERACILLIN SODIUM AND TAZOBACTAM SODIUM 4.5 G: 4; .5 INJECTION, POWDER, LYOPHILIZED, FOR SOLUTION INTRAVENOUS at 07:04

## 2019-01-11 RX ADMIN — MAGNESIUM SULFATE HEPTAHYDRATE 2 G: 40 INJECTION, SOLUTION INTRAVENOUS at 08:41

## 2019-01-11 RX ADMIN — OXYCODONE HYDROCHLORIDE 5 MG: 5 TABLET ORAL at 00:37

## 2019-01-11 RX ADMIN — HEPARIN SODIUM 5000 UNITS: 5000 INJECTION INTRAVENOUS; SUBCUTANEOUS at 15:18

## 2019-01-11 RX ADMIN — HEPARIN SODIUM 5000 UNITS: 5000 INJECTION INTRAVENOUS; SUBCUTANEOUS at 21:30

## 2019-01-11 RX ADMIN — Medication 10 ML: at 13:51

## 2019-01-11 RX ADMIN — TBO-FILGRASTIM 300 MCG: 300 INJECTION, SOLUTION SUBCUTANEOUS at 08:53

## 2019-01-11 RX ADMIN — POTASSIUM & SODIUM PHOSPHATES POWDER PACK 280-160-250 MG 2 PACKET: 280-160-250 PACK at 12:45

## 2019-01-11 RX ADMIN — PIPERACILLIN SODIUM AND TAZOBACTAM SODIUM 4.5 G: 4; .5 INJECTION, POWDER, LYOPHILIZED, FOR SOLUTION INTRAVENOUS at 12:45

## 2019-01-11 NOTE — PROGRESS NOTES
[facility] Dosing Services: Vancomycin trough evaluation     Consult for antibiotic dosing of Vancomycin by Dr. Tavares Edwards  Pharmacist reviewed antibiotic appropriateness for 41year old , male  for indication of Febrile neutropenia (?mucositis too)  Day of Therapy 1    Level is slightly subtherapeutic (extrapolated to 13.24mcg/mL). Increase to 1250mg ivpb q8h for a goal trough of 15-20mcg/mL      Pharmacy to follow daily and will make changes to dose and/or frequency based on clinical status. Date Dose & Interval Measured (mcg/mL) Extrapolated (mcg/mL)   ? 1/10/19 ? 1gm q8h ?15.2mcg/mL ?13.24mcg/mL   ? ? ? ?   ? ? ? ? Thank you,    Ute Milian. Marcello, Pharm D.         Contact: [phone]

## 2019-01-11 NOTE — PROGRESS NOTES
0000- patient AxOx4 in bed watching TV. VS stable- afebrile. Does state that mouth pain 5/10. Will continue to monitor. 0030- Notified MD that patient states pain is 5/10.  Reorder Roxicodone from Q6 to Q4 PRN

## 2019-01-11 NOTE — PROGRESS NOTES
Problem: Falls - Risk of  Goal: *Absence of Falls  Document Perla Fall Risk and appropriate interventions in the flowsheet. Outcome: Progressing Towards Goal  Fall Risk Interventions:            Medication Interventions: Patient to call before getting OOB, Teach patient to arise slowly. ..    0700- Bedside and Verbal shift change report given to Xavier Dominguez RN (oncoming nurse) by Anthony Waggoner RN  (offgoing nurse). Report included the following information SBAR, Kardex and Recent Results. .. 1414- TRANSFER - OUT REPORT:    Verbal report given to Mildred CABALLERO(name) on Denisse Lockhart  being transferred to [facility](unit) for routine progression of care       Report consisted of patients Situation, Background, Assessment and   Recommendations(SBAR). Information from the following report(s) SBAR, Kardex and Recent Results was reviewed with the receiving nurse. Lines:   Venous Access Device Bard PowerPort 12/20/18 Upper chest (subclavicular area, right (Active)   Central Line Being Utilized Yes 1/11/2019  8:00 AM   Criteria for Appropriate Use Irritant/vesicant medication 1/11/2019  8:00 AM   Site Assessment Clean, dry, & intact 1/11/2019  8:00 AM   Date of Last Dressing Change 01/09/19 1/11/2019  8:00 AM   Dressing Status Clean, dry, & intact 1/11/2019  8:00 AM   Dressing Type Transparent 1/11/2019  8:00 AM   Date Accessed (Medial Site) 01/09/19 1/11/2019  8:00 AM   Access Time (Medial Site) 1200 1/9/2019 12:00 PM   Access Needle Size (Site #1) 20 G 1/11/2019  8:00 AM   Access Needle Length (Medial Site) 0.75 inches 1/9/2019 12:00 PM   Positive Blood Return (Medial Site) Yes 1/11/2019 12:03 AM   Action Taken ([facility]) Infusing 1/11/2019  8:00 AM   Alcohol Cap Used Yes 1/11/2019 12:03 AM        Opportunity for questions and clarification was provided. [facility]

## 2019-01-11 NOTE — PROGRESS NOTES
Bedside and Verbal shift change report given to Brandan Palma RN (oncoming nurse) by Malka Baugh RN (offgoing nurse). Report included the following information SBAR and Kardex.

## 2019-01-11 NOTE — PROGRESS NOTES
[address] [place] [address] [facility] Progress Note           Sonu Lama DO    [phone] Office  [phone]  Fax    2019      Assessment & Plan:   1. Febrile neutropenia. I suspect this is due to a dental infection in the setting of poor dentition and recent chemotherapy. Continue pip-tazo. Stop vanco as BC's negative at 48 hours. Anticipate d/c on augmentin 875mg po bid. Recommend f/u with dentist or oral surgery   2. Recently diagnosed follicular lymphoma. The patient is on treatment with obinutuzumab-CHOP. Subjective:     No complaints    Objective:     Vitals:   Visit Vitals  /79 (BP 1 Location: Right arm, BP Patient Position: At rest)   Pulse 91   Temp 98 °F (36.7 °C)   Resp 12   Ht 5' 6\" (1.676 m)   Wt 65.8 kg (145 lb)   SpO2 97%   BMI 23.40 kg/m²        Tmax:  Temp (24hrs), Av.1 °F (37.3 °C), Min:98 °F (36.7 °C), Max:100.7 °F (38.2 °C)      Exam:   Patient is intubated:  no    Physical Examination:   General:  Alert, cooperative, no distress   Head:  Normocephalic, atraumatic. Poor dentition   Eyes:  Conjunctivae clear   Neck: Supple       Lungs:   No distress. Chest wall:     Heart:     Abdomen:      Extremities: Moves all. Skin:  No rash   Neurologic: CNII-XII intact. Normal strength     Labs:        No lab exists for component: ITNL   No results for input(s): CPK, CKMB, TROIQ in the last 72 hours. Recent Labs     19  0327 01/10/19  0124 19  1209    138 132*   K 3.3* 3.6 3.5    106 98   CO2 23 22 23   BUN 3* 6 7   CREA 0.94 1.05 1.16   GLU 98 119* 136*   PHOS 2.0*  --   --    MG 1.5*  --   --    ALB 2.0* 2.0* 2.9*   WBC 2.2* 1.1* 1.1*   HGB 9.2* 9.6* 11.8*   HCT 27.2* 28.4* 33.3*    113* 99*     No results for input(s): INR, PTP, APTT in the last 72 hours.     No lab exists for component: INREXT  Needs: urine analysis, urine sodium, protein and creatinine  No results found for: ARA, CREAU      Cultures:     No results found for: SDES  Lab Results   Component Value Date/Time    Culture result: NO GROWTH 2 DAYS 01/09/2019 02:47 PM    Culture result: NO GROWTH 2 DAYS 01/09/2019 12:09 PM    Culture result: NO GROWTH 2 DAYS 01/22/2017 11:20 AM       Radiology:     Medications       Current Facility-Administered Medications   Medication Dose Route Frequency Last Dose    oxyCODONE IR (ROXICODONE) tablet 5 mg  5 mg Oral Q4H PRN 5 mg at 01/11/19 0712    potassium, sodium phosphates (NEUTRA-PHOS) packet 2 Packet  2 Packet Oral Q4H 2 Packet at 01/11/19 0841    Vancomycin Trough - draw at 2330 on 1/11 (draw 30 min prior to 0000 dose on 1/12)   Other ONCE      tbo-filgrastim (GRANIX) injection 300 mcg  300 mcg SubCUTAneous DAILY 300 mcg at 01/11/19 0853    piperacillin-tazobactam (ZOSYN) 4.5 g in 0.9% sodium chloride (MBP/ADV) 100 mL  4.5 g IntraVENous Q6H 4.5 g at 01/11/19 0704    vancomycin (VANCOCIN) 1,250 mg in 0.9% sodium chloride 250 mL IVPB  1,250 mg IntraVENous Q8H 1,250 mg at 01/11/19 0853    sodium chloride (NS) flush 5-40 mL  5-40 mL IntraVENous Q8H 10 mL at 01/11/19 0709    sodium chloride (NS) flush 5-40 mL  5-40 mL IntraVENous PRN 10 mL at 01/10/19 2111    acetaminophen (TYLENOL) tablet 650 mg  650 mg Oral Q6H  mg at 01/10/19 1240    naloxone (NARCAN) injection 0.4 mg  0.4 mg IntraVENous PRN      0.9% sodium chloride infusion  100 mL/hr IntraVENous CONTINUOUS 100 mL/hr at 01/11/19 0854    nicotine (NICODERM CQ) 21 mg/24 hr patch 1 Patch  1 Patch TransDERmal Q24H 1 Patch at 01/10/19 1631    lactobac ac& pc-s.therm-b.anim (AUSTIN Q/RISAQUAD)  1 Cap Oral DAILY 1 Cap at 01/11/19 0841    LORazepam (ATIVAN) tablet 0.5 mg  0.5 mg Oral Q8H PRN      senna-docusate (PERICOLACE) 8.6-50 mg per tablet 1 Tab  1 Tab Oral DAILY 1 Tab at 01/10/19 0924    heparin (porcine) injection 5,000 Units  5,000 Units SubCUTAneous Q8H 5,000 Units at 01/11/19 0707    atorvastatin (LIPITOR) tablet 10 mg  10 mg Oral QHS 10 mg at 01/10/19 8303 Case discussed with:      Andrei Liu, DO

## 2019-01-11 NOTE — PROGRESS NOTES
1- Reason for Admission:   Dental Pain, Fever, Chills                  RRAT Score:  13                Do you (patient/family) have any concerns for transition/discharge? Plan for utilizing home health:  TBD       Likelihood of readmission? Moderate            Transition of Care Plan:   Home with father    I met with the pt to determine potential discharge needs. He lives with his father, Mayda Nava [phone]), in a one story house with 4 entry steps. He is independent with his ADL's and has no assistive devices. He said he applied for Social Security Disability in December but they did not receive the application. He has no PCP but sees Dr. Jenniffer Edmondson for his Lymphoma. He has no health insrance but gets his medications from [facility] on [address]. I referred him to [facility] for help in applying for financial assistance (SSD, Medicaid, etc.) and to our CM Specialist for help in finding a PCP. CM will follow and assist as indicated.     Care Management Interventions  PCP Verified by CM: Yes(No PCP-referred to CM SpecialistLives with father in one story house)  Current Support Network: (Lives with father in a one story house)  Confirm Follow Up Transport: Family  Plan discussed with Pt/Family/Caregiver: Yes  Discharge Location  Discharge Placement: (Home)    ARUN Stone, CM

## 2019-01-12 VITALS
TEMPERATURE: 98.8 F | HEART RATE: 76 BPM | OXYGEN SATURATION: 99 % | WEIGHT: 145 LBS | SYSTOLIC BLOOD PRESSURE: 130 MMHG | HEIGHT: 66 IN | RESPIRATION RATE: 16 BRPM | BODY MASS INDEX: 23.3 KG/M2 | DIASTOLIC BLOOD PRESSURE: 84 MMHG

## 2019-01-12 LAB
ANION GAP SERPL CALC-SCNC: 11 MMOL/L (ref 5–15)
BASOPHILS # BLD: 0 K/UL (ref 0–0.1)
BASOPHILS NFR BLD: 0 % (ref 0–1)
BUN SERPL-MCNC: 3 MG/DL (ref 6–20)
BUN/CREAT SERPL: 4 (ref 12–20)
CALCIUM SERPL-MCNC: 7.2 MG/DL (ref 8.5–10.1)
CHLORIDE SERPL-SCNC: 114 MMOL/L (ref 97–108)
CO2 SERPL-SCNC: 21 MMOL/L (ref 21–32)
CREAT SERPL-MCNC: 0.77 MG/DL (ref 0.7–1.3)
DIFFERENTIAL METHOD BLD: ABNORMAL
EOSINOPHIL # BLD: 0 K/UL (ref 0–0.4)
EOSINOPHIL NFR BLD: 0 % (ref 0–7)
ERYTHROCYTE [DISTWIDTH] IN BLOOD BY AUTOMATED COUNT: 13.2 % (ref 11.5–14.5)
GLUCOSE SERPL-MCNC: 79 MG/DL (ref 65–100)
HCT VFR BLD AUTO: 25.7 % (ref 36.6–50.3)
HGB BLD-MCNC: 8.4 G/DL (ref 12.1–17)
IMM GRANULOCYTES # BLD AUTO: 0 K/UL
IMM GRANULOCYTES NFR BLD AUTO: 0 %
LYMPHOCYTES # BLD: 1.3 K/UL (ref 0.8–3.5)
LYMPHOCYTES NFR BLD: 19 % (ref 12–49)
MAGNESIUM SERPL-MCNC: 1.7 MG/DL (ref 1.6–2.4)
MCH RBC QN AUTO: 29.4 PG (ref 26–34)
MCHC RBC AUTO-ENTMCNC: 32.7 G/DL (ref 30–36.5)
MCV RBC AUTO: 89.9 FL (ref 80–99)
METAMYELOCYTES NFR BLD MANUAL: 1 %
MONOCYTES # BLD: 1.2 K/UL (ref 0–1)
MONOCYTES NFR BLD: 17 % (ref 5–13)
MYELOCYTES NFR BLD MANUAL: 2 %
NEUTS SEG # BLD: 4.1 K/UL (ref 1.8–8)
NEUTS SEG NFR BLD: 61 % (ref 32–75)
NRBC # BLD: 0 K/UL (ref 0–0.01)
NRBC BLD-RTO: 0 PER 100 WBC
PHOSPHATE SERPL-MCNC: 2 MG/DL (ref 2.6–4.7)
PLATELET # BLD AUTO: 136 K/UL (ref 150–400)
PMV BLD AUTO: 10.6 FL (ref 8.9–12.9)
POTASSIUM SERPL-SCNC: 3.2 MMOL/L (ref 3.5–5.1)
RBC # BLD AUTO: 2.86 M/UL (ref 4.1–5.7)
RBC MORPH BLD: ABNORMAL
SODIUM SERPL-SCNC: 146 MMOL/L (ref 136–145)
WBC # BLD AUTO: 6.8 K/UL (ref 4.1–11.1)

## 2019-01-12 PROCEDURE — 84100 ASSAY OF PHOSPHORUS: CPT

## 2019-01-12 PROCEDURE — 74011000250 HC RX REV CODE- 250: Performed by: INTERNAL MEDICINE

## 2019-01-12 PROCEDURE — 36415 COLL VENOUS BLD VENIPUNCTURE: CPT

## 2019-01-12 PROCEDURE — 74011250636 HC RX REV CODE- 250/636: Performed by: INTERNAL MEDICINE

## 2019-01-12 PROCEDURE — 80048 BASIC METABOLIC PNL TOTAL CA: CPT

## 2019-01-12 PROCEDURE — 74011250637 HC RX REV CODE- 250/637: Performed by: INTERNAL MEDICINE

## 2019-01-12 PROCEDURE — 74011000258 HC RX REV CODE- 258: Performed by: INTERNAL MEDICINE

## 2019-01-12 PROCEDURE — 83735 ASSAY OF MAGNESIUM: CPT

## 2019-01-12 PROCEDURE — 85025 COMPLETE CBC W/AUTO DIFF WBC: CPT

## 2019-01-12 RX ORDER — HYDROCHLOROTHIAZIDE 12.5 MG/1
12.5 TABLET ORAL DAILY
Qty: 30 TAB | Refills: 2 | Status: SHIPPED
Start: 2019-01-12 | End: 2019-02-17

## 2019-01-12 RX ORDER — AMOXICILLIN AND CLAVULANATE POTASSIUM 875; 125 MG/1; MG/1
1 TABLET, FILM COATED ORAL EVERY 12 HOURS
Qty: 20 TAB | Refills: 0 | Status: SHIPPED | OUTPATIENT
Start: 2019-01-12 | End: 2019-01-22

## 2019-01-12 RX ORDER — HEPARIN 100 UNIT/ML
300 SYRINGE INTRAVENOUS AS NEEDED
Status: DISCONTINUED | OUTPATIENT
Start: 2019-01-12 | End: 2019-01-12 | Stop reason: HOSPADM

## 2019-01-12 RX ORDER — LISINOPRIL 10 MG/1
10 TABLET ORAL DAILY
Qty: 30 TAB | Refills: 2 | Status: SHIPPED | OUTPATIENT
Start: 2019-01-12 | End: 2019-09-06 | Stop reason: SDUPTHER

## 2019-01-12 RX ADMIN — HEPARIN SODIUM 5000 UNITS: 5000 INJECTION INTRAVENOUS; SUBCUTANEOUS at 06:10

## 2019-01-12 RX ADMIN — PIPERACILLIN SODIUM AND TAZOBACTAM SODIUM 4.5 G: 4; .5 INJECTION, POWDER, LYOPHILIZED, FOR SOLUTION INTRAVENOUS at 06:10

## 2019-01-12 RX ADMIN — Medication 1 CAPSULE: at 08:59

## 2019-01-12 RX ADMIN — TBO-FILGRASTIM 300 MCG: 300 INJECTION, SOLUTION SUBCUTANEOUS at 09:51

## 2019-01-12 RX ADMIN — Medication 10 ML: at 06:15

## 2019-01-12 RX ADMIN — SODIUM CHLORIDE: 900 INJECTION, SOLUTION INTRAVENOUS at 08:59

## 2019-01-12 RX ADMIN — PIPERACILLIN SODIUM AND TAZOBACTAM SODIUM 4.5 G: 4; .5 INJECTION, POWDER, LYOPHILIZED, FOR SOLUTION INTRAVENOUS at 01:01

## 2019-01-12 RX ADMIN — SODIUM CHLORIDE, PRESERVATIVE FREE 300 UNITS: 5 INJECTION INTRAVENOUS at 15:20

## 2019-01-12 NOTE — PROGRESS NOTES
[facility] [place] at [address] [facility]  Darrell Chalk: [phone]  F: [phone]      Reason for Visit:   Tiera Friedman is a 41 y.o. male who is seen in consultation at the request of Dr. Vance Dominguez for evaluation of dental abscess    Hematology / Oncology Treatment History:     Diagnosis: Follicular lymphoma     Stage: IV     Pathology:   11/13/18 right inguinal LN excision: Follicular lymphoma, high-grade (grade 3a of 3). Comment   The delaney architecture is entirely effaced by atypical lymphocytic proliferation with prominent nodular growth pattern. The majority of the atypical lymphocytes are small to medium in size and have irregular nuclear outlines and inconspicuous nucleoli, morphologically consistent with centrocytes. Scattered large lymphocytes with prominent nucleoli, consistent with centroblasts are identified (> 15 per high-power field). Focal increase in mitotic figures is noted. Occasional follicular dendritic cells are seen. By immunohistochemistry, the atypical lymphocytes are positive for CD20, PAX5, CD10, BCL6 and BCL2 (weak, focal) and negative for MUM1. CD3, CD5 and CD43 stain numerous background T lymphocytes. Ki-67 reveals a high proliferation index in the neoplastic follicles (overall 89-83%). Clinical history indicates 14 cm retroperitoneal mass with bilateral inguinal lymphadenopathy. In summary, the combined morphologic and phenotypic findings are diagnostic of a high-grade follicular lymphoma (grade 3a of 3). There is no evidence of diffuse large B-cell lymphoma. Flow cytometry analysis:   Monoclonal B-cell population (47 % of all cells) with mild increase in side and forward scatter properties expressing CD19, CD20, CD23 and CD10 with surface lambda light chain restriction. No phenotypically aberrant T-cell population.    Flow cytometry was performed at [place]      Prior Treatment: None     Current Treatment: Obinutuzumab-CHOP. Obinutuzumab: 1000 mg weekly on days 1, 8, 15 for cycle 1, then 1000 mg on day 1 q21 days for cycles 2-6, then monotherapy 1000 mg every 21 days for cycle 7, 8 with Cyclophosphamide 750mg/m2, Doxorubicin 50mg/m2, Vincristine   1.4mg/m2 on day 1 and Prednisone 100mg on Days 1-5, every 21 days for a total of 6 cycles. Oncologic History:  He states he was in his usual state of health in early November 2018, but then he developed low back pain and presented to the ER. The pain was described as constant, radiating to the buttocks, without exacerbating or alleviating factors, associated w/ increased urination, no n/v/d, no dysuria, no fever, he's unsure about weight loss. Work-up at outside hospital revealed a retroperitoneal mass seen on CT imaging, and he was transferred to [facility] for further work-up. CT there showed a large retroperitoneal mass encircling the aorta with invasion of the left renal hilum and left adrenal gland.  There were bilateral inguinal lymph nodes and moderate left hydronephrosis. He was evaluated while at [facility] and was noted to have palpable nodes in his groin for approximately the past 1 month. No testicular mass, anorexia, weight loss, fevers, night sweats, chest pain, shortness of breath, abdominal pain or nausea. He underwent excisional LN biopsy of right inguinal LN. He was told that he had likely lymphoma. He was advised to follow up as outpatient for PET scan, bone marrow biopsy. However, patient states he was lost to f/u. He never received any calls or appointment details. His aunt urged patient to seek care elsewhere and his PCP recommended [facility]. At our first meeting on 12/13/18, he tells me that he was working full time, feeling well until early Nov 2018. When he developed the left lower back pain, he went to [facility] and [facility]. No fevers, chills, night sweats. He has lost 15 lbs in the past month due to low appetite. No n/v/d.  No current constipation. No CP, SOB. No h/o cardiac disease aside from HTN, Hyperlipiemia. No hematochezia/melena, hematuria. He has HTN and XOL, which he was taking meds for, but has run out. Has no PCP currently. He is uninsured. He works as a cook, currently working part time. He has children aged 12 and 13. Interval History:     He is feeling well. Mouth pain much improved. No fevers. Wants to go home. PAST HISTORY: The following sections were reviewed and updated in the EMR as appropriate: PMH, SH, FH, Medications, Allergies. No Known Allergies   Review of Systems: A complete review of systems was obtained, reviewed. Pertinent findings reviewed above. Physical Exam:     Visit Vitals  /86 (BP 1 Location: Left arm, BP Patient Position: At rest)   Pulse 81   Temp 98.2 °F (36.8 °C)   Resp 12   Ht 5' 6\" (1.676 m)   Wt 145 lb (65.8 kg)   SpO2 100%   BMI 23.40 kg/m²     ECOG PS: 2  General: No distress  Eyes: PERRLA, anicteric sclerae  HENT: poor dentition noted/ missing teeth/  Atraumatic with normal appearance of ears and nose; Some facial swelling noted at left lower jaw and right mid cheek. Neck: Supple; no thyromegaly   Lymphatic: Rt cervical node; no supraclavicular, or axillary adenopathy  Respiratory: CTAB, normal respiratory effort  CV: Normal rate, regular rhythm, no murmurs, no peripheral edema  GI: Soft, nontender, nondistended, no masses, no hepatomegaly, no splenomegaly  MS: Digits without clubbing or cyanosis. Skin: No rashes, ecchymoses, or petechiae. Normal temperature, turgor, and texture. Neuro/Psych: Alert, oriented, appropriate affect, normal judgment/insight      Results:     Lab Results   Component Value Date/Time    WBC 6.8 01/12/2019 04:05 AM    HGB 8.4 (L) 01/12/2019 04:05 AM    HCT 25.7 (L) 01/12/2019 04:05 AM    PLATELET 037 (L) 92/85/3490 04:05 AM    MCV 89.9 01/12/2019 04:05 AM    ABS.  NEUTROPHILS 4.1 01/12/2019 04:05 AM    Hemoglobin (POC) 15.0 06/05/2009 02:13 PM Hematocrit (POC) 44 06/05/2009 02:13 PM     Lab Results   Component Value Date/Time    Sodium 146 (H) 01/12/2019 04:05 AM    Potassium 3.2 (L) 01/12/2019 04:05 AM    Chloride 114 (H) 01/12/2019 04:05 AM    CO2 21 01/12/2019 04:05 AM    Glucose 79 01/12/2019 04:05 AM    BUN 3 (L) 01/12/2019 04:05 AM    Creatinine 0.77 01/12/2019 04:05 AM    GFR est AA >60 01/12/2019 04:05 AM    GFR est non-AA >60 01/12/2019 04:05 AM    Calcium 7.2 (L) 01/12/2019 04:05 AM    Sodium (POC) 139 06/05/2009 02:13 PM    Potassium (POC) 3.9 06/05/2009 02:13 PM    Chloride (POC) 103 06/05/2009 02:13 PM    Glucose (POC) 98 06/05/2009 02:13 PM    BUN (POC) 9 06/05/2009 02:13 PM    Creatinine (POC) 1.0 06/05/2009 02:13 PM    Calcium, ionized (POC) 1.21 06/05/2009 02:13 PM     Lab Results   Component Value Date/Time    Bilirubin, total 0.3 01/11/2019 03:27 AM    ALT (SGPT) 30 01/11/2019 03:27 AM    AST (SGOT) 12 (L) 01/11/2019 03:27 AM    Alk. phosphatase 70 01/11/2019 03:27 AM    Protein, total 5.4 (L) 01/11/2019 03:27 AM    Albumin 2.0 (L) 01/11/2019 03:27 AM    Globulin 3.4 01/11/2019 03:27 AM       Lab Results   Component Value Date/Time     12/28/2018 10:00 AM    Beta-2 Microglobulin, serum 2.5 (H) 12/28/2018 10:00 AM    TSH 1.53 09/28/2016 04:40 AM    Lipase 198 11/09/2018 09:49 PM    Hep C  virus Ab Interp. NONREACTIVE 12/27/2018 04:53 PM     Lab Results   Component Value Date/Time    INR 1.0 11/09/2018 09:49 PM    aPTT 26.1 07/30/2018 07:46 AM     Lab Results   Component Value Date/Time     12/28/2018 10:00 AM    HCG, beta, QT <1 11/10/2018 03:41 AM    AFP, Serum, Tumor Marker 4.1 11/10/2018 03:41 AM     1/09/2019 XR CHEST  IMPRESSION:  1. No radiographic evidence of acute cardiopulmonary disease. 1/09/2019 CT MAXILLOFACIAL WO CONT  IMPRESSION:   1. Extensive periodontal disease and dental caries of the maxillary and  mandibular teeth.  Ill-defined soft tissue measuring 2.2 x 1.1 cm in the right  buccal gingiva adjacent to periapical lucency of the root of tooth number 27,  with associated erosion of the buccal cortex of the mandible. This is favored to  represent phlegmon, with no definite organized fluid collection identified,  although evaluation limited by lack of IV contrast. There is overlying extensive  cellulitis of the premandibular and submandibular soft tissues, right worse than  left, as well as enlarged bilateral reactive level 1B lymph nodes, largest on  the right measuring 12 mm. Assessment and Recommendations:   42 yo male currently undergoing tx for follicualr lymphoma admitted with dental caries and neutropenic fever    1. Follicular lymphoma:  Recently started chemotherapy C1D15 of treatment was held due to admission for sepsis. Follow up with Dr. Nara Handy as outpatient. 2. Neutropenic fever: 2/2 dental infection. No abscess. Blood cultures negative. Appreciate ID and ENT evaluation. Neutropenia resolved after growth factor. Jennifer Rebolledo for discharge from heme/onc perspective. Home with Augmentin. Will need dental evaluation vs OMFS after hospital discharge, though financial barriers may prevent this. 3. Normocytic anemia: 2/2 chemotherapy. 4. Thrombocytopenia: 2/2 to treatment and infection    5. Neoplasm related pain / Anxiety: Left lower back pain. Takes Oxycodone and Ativan at home  -- Continue home meds and monitor    6.  HTN / Hyperlipidemia: Management per hospitalist      Signed By: Bryan De Dios MD

## 2019-01-12 NOTE — PROGRESS NOTES
[facility] [address]  [address]  [phone]      Medical Progress Note      NAME: Shawn Cuellar   :  1975  MRM:  [id number]    Date/Time: 2019        Assessment / Plan:     41 y.o. male with hx of follicular lymphoma on chemo presents with dental pain/carries and neutropenic fever.        Sepsis / Neutropenic fever: from odontogenic infection/phlegmon noted on CT. No drainable abscess. No oral surgeon here at [facility], appreciate ENT eval. Continue Zosyn, vanc discontinued. Anticipate DC home tomorrow on Augmetin if ANC improves. Will need oral surgeon / dentist evaluation       Neutropenia / thrombocytopenia / anemia / pancytopenia: due to chemo. Watch CBC with diff. Continue G-CSF, total 3 days. Follow CBC with diff       HTN (hypertension): hold all antihypertensives due to sepsis. Hemodynamics stable      Tobacco abuse: smokes ~one ppd. Advised smoking cessation. Continue nicotine patch       Hyperlipidemia (): continue statin       Follicular lymphoma ([facility]): on chemo. Followed by Dr. Ava Chopra.        Anxiety: continue Ativan PRN       Total time spent: 25 minutes, d/w pt's father at bedside  Time spent in the care of this patient including reviewing records, discussing with nursing and/or other providers on the treatment team, obtaining history and examining the patient, and discussing treatment plans. Care Plan discussed with: Patient, Nursing Staff and >50% of time spent in counseling and coordination of care    Discussed:  Care Plan    Prophylaxis:  Hep SQ    Disposition:  Home w/Family         Subjective:     Chief Complaint:  Follow up neutropenic fever    Chart/notes/labs/studies reviewed, patient examined at bedside. Feels better. No fevers finally. Dental pain and facial swelling improved          Objective:       Vitals:        Last 24hrs VS reviewed since prior progress note.  Most recent are:    Visit Vitals  BP 141/89 (BP 1 Location: Left arm, BP Patient Position: At rest)   Pulse 97   Temp 97.7 °F (36.5 °C)   Resp 18   Ht 5' 6\" (1.676 m)   Wt 65.8 kg (145 lb)   SpO2 97%   BMI 23.40 kg/m²     SpO2 Readings from Last 6 Encounters:   01/11/19 97%   01/09/19 98%   01/09/19 98%   01/03/19 97%   12/28/18 100%   12/28/18 100%            Intake/Output Summary (Last 24 hours) at 1/11/2019 2150  Last data filed at 1/11/2019 1415  Gross per 24 hour   Intake 3256.67 ml   Output 400 ml   Net 2856.67 ml          Exam:     Physical Exam:    Gen: chronically ill-appearing,NAD  HEENT:  No scleral icterus, hearing intact to voice, dry mucous membranes with dental carries, very poor dentition. Neck:  Supple, no nuchal rigidity  Resp:  No accessory muscle use. CTAB without wheezing, rales, rhonchi  Card: HR 90s, reg rhythm. Normal S1 and S2 without murmurs, rubs, or gallops. No peripheral lower extremity edema. No JVD. Peripheral pulses in tact. Abd:  Normoactive bowel sounds. Soft, non-tender, non-distended. No rebound, no guarding. Skin:  No rashes or ulcers; turgor intact. Neuro:  Cranial nerves are grossly intact, no focal motor weakness, follows commands appropriately  Psych:  Good insight, normal affect. Alert, oriented x 3.  Answers questions appropriately         Medications Reviewed: (see below)    Lab Data Reviewed: (see below)    ______________________________________________________________________    Medications:     Current Facility-Administered Medications   Medication Dose Route Frequency    oxyCODONE IR (ROXICODONE) tablet 5 mg  5 mg Oral Q4H PRN    tbo-filgrastim (GRANIX) injection 300 mcg  300 mcg SubCUTAneous DAILY    piperacillin-tazobactam (ZOSYN) 4.5 g in 0.9% sodium chloride (MBP/ADV) 100 mL  4.5 g IntraVENous Q6H    sodium chloride (NS) flush 5-40 mL  5-40 mL IntraVENous Q8H    sodium chloride (NS) flush 5-40 mL  5-40 mL IntraVENous PRN    acetaminophen (TYLENOL) tablet 650 mg  650 mg Oral Q6H PRN    naloxone (NARCAN) injection 0.4 mg  0.4 mg IntraVENous PRN    0.9% sodium chloride infusion  100 mL/hr IntraVENous CONTINUOUS    nicotine (NICODERM CQ) 21 mg/24 hr patch 1 Patch  1 Patch TransDERmal Q24H    lactobac ac& pc-s.therm-b.anim (AUSTIN Q/RISAQUAD)  1 Cap Oral DAILY    LORazepam (ATIVAN) tablet 0.5 mg  0.5 mg Oral Q8H PRN    senna-docusate (PERICOLACE) 8.6-50 mg per tablet 1 Tab  1 Tab Oral DAILY    heparin (porcine) injection 5,000 Units  5,000 Units SubCUTAneous Q8H    atorvastatin (LIPITOR) tablet 10 mg  10 mg Oral QHS            Lab Review:     Recent Labs     01/11/19  0327 01/10/19  0124 01/09/19  1209   WBC 2.2* 1.1* 1.1*   HGB 9.2* 9.6* 11.8*   HCT 27.2* 28.4* 33.3*    113* 99*     Recent Labs     01/11/19  0327 01/10/19  0124 01/09/19  1209    138 132*   K 3.3* 3.6 3.5    106 98   CO2 23 22 23   GLU 98 119* 136*   BUN 3* 6 7   CREA 0.94 1.05 1.16   CA 8.2* 7.9* 8.8   MG 1.5*  --   --    PHOS 2.0*  --   --    ALB 2.0* 2.0* 2.9*   SGOT 12* 14* 17   ALT 30 38 42     No components found for: GLPOC  No results for input(s): PH, PCO2, PO2, HCO3, FIO2 in the last 72 hours. No results for input(s): INR in the last 72 hours.     No lab exists for component: INREXT, INREXT  No results found for: SDES  Lab Results   Component Value Date/Time    Culture result: NO GROWTH 2 DAYS 01/09/2019 02:47 PM    Culture result: NO GROWTH 2 DAYS 01/09/2019 12:09 PM    Culture result: NO GROWTH 2 DAYS 01/22/2017 11:20 AM              ___________________________________________________    Attending Physician: Naresh Toribio MD

## 2019-01-12 NOTE — DISCHARGE INSTRUCTIONS
HOSPITALIST DISCHARGE INSTRUCTIONS  NAME: Georgina Izquierdo   :  1975   MRN:  [id number]     Date/Time:  2019 8:05 AM    ADMIT DATE: 2019     DISCHARGE DATE: 2019     PRINCIPAL DISCHARGE DIAGNOSES:  Neutropenic fever with sepsis  Dental infection    MEDICATIONS:  · It is important that you take the medication exactly as they are prescribed. Note the changes and additions to your medications. Be sure you understand these changes before you are discharged today. · Keep your medication in the bottles provided by the pharmacist and keep a list of the medication names, dosages, and times to be taken in your wallet. · Do not take other medications without consulting your doctor. Pain Management: per above medications    What to do at Home  Make sure to complete the course of your antibiotics  See a dentist or surgeon as soon as possible for your dental infection    Recommended diet:  Resume previous diet    Recommended activity: Activity as tolerated    If you experience any of the following symptoms then please call your primary care physician or return to the emergency room if you cannot get hold of your doctor:  Fever, chills, severe abdominal pain, nausea, vomiting, diarrhea, confusion, weakness, bleeding, severe chest pain, shortness of breath, or other severe concerning symptoms that brought you to the hospital in the first place    Follow Up: Follow-up Information     Follow up With Specialties Details Why Contact Info    follow up with a primary care physician         follow up with a dentist or an oral surgeon as soon as possible        Chin Eason MD Hematology and Oncology In 1 week  [address] [facility]  [address] [facility]  [phone]              Information obtained by :  I understand that if any problems occur once I am at home I am to contact my physician. I understand and acknowledge receipt of the instructions indicated above. Physician's or R.N.'s Signature                                                                  Date/Time                                                                                                                                              Patient or Representative Signature                                                          Date/Time

## 2019-01-12 NOTE — DISCHARGE SUMMARY
Physician Discharge Summary     Patient ID:  Earle Mohamud  [id number]  8 y.o.  1975    Admit date: 1/9/2019    Discharge date: 1/12/2019    Admission Diagnoses: Sepsis ([address] [A41.9]  Neutropenic fever ([address] [D70.9, R50.81]    Principal Discharge Diagnoses:    Sepsis  Neutropenic fever    OTHER PROBLEMS ADDRESSEDS  Principal Diagnosis <principal problem not specified>                                            Active Problems:    HTN (hypertension) (9/27/2016)      Tobacco abuse (9/27/2016)      Hyperlipidemia ()      Follicular lymphoma ([address] (11/16/2018)      Anxiety (1/9/2019)      Pain, dental (1/9/2019)      Sepsis ([address] (1/9/2019)      Neutropenic fever ([address] (1/9/2019)       Patient Active Problem List   Diagnosis Code    HTN (hypertension) I10    Tobacco abuse Z72.0    Left sided numbness R20.0    Hyperlipidemia E78.5    Retroperitoneal mass R19.00    Lymphadenopathy Q64.7    Follicular lymphoma ([address] C82.90    Anxiety F41.9    Pain, dental K08.89    Sepsis ([address] A41.9    Neutropenic fever ([address] D70.9, R50.81         Hospital Course:   Mr. Elo Alanis is a 41 y.o. male with hx of follicular lymphoma on chemo presenting with dental pain/carries and neutropenic fever.       Sepsis / Neutropenic fever: from odontogenic infection/phlegmon noted on CT. No drainable abscess. No oral surgeon here at [facility], appreciate ENT eval. Treated with Zosyn and vanc. Stable for DC on Augmentin. Advised to follow up with oral surgeon / dentist at [facility].       Neutropenia / thrombocytopenia / anemia / pancytopenia: due to chemo. Neutropenia resolved after G-CSF      HTN: hold antihypertensives for a few days post discharge         Follicular lymphoma: follow up with Dr. Maria Antonia Argueta      Pt discharged in improved and stable condition.      Procedures performed: see above    Imaging studies: see above        PCP: None    Consults: ID, Hematology/Oncology and ENT    Discharge Exam:  Patient Vitals for the past 24 hrs:   Temp Pulse Resp BP SpO2   01/12/19 1056 98.8 °F (37.1 °C) 76 16 130/84 99 %   01/12/19 0635 98.2 °F (36.8 °C) 81 12 133/86 100 %   01/12/19 0406 97.4 °F (36.3 °C) 88 18 127/80 98 %   01/11/19 2329 97.5 °F (36.4 °C) 85 18 129/86 97 %   01/11/19 2030 97.7 °F (36.5 °C) 97 18 141/89 97 %     GEN: NAD  CV: RRR  RESP: CTAB  HEENT: poor dentition. No OP exudates    Disposition: home    Patient Instructions:   Current Discharge Medication List      START taking these medications    Details   L. acidoph & paracasei- S therm- Bifido (AUSTIN-Q/RISAQUAD) 8 billion cell cap cap Take 1 Cap by mouth daily. Qty: 14 Cap, Refills: 0      amoxicillin-clavulanate (AUGMENTIN) 875-125 mg per tablet Take 1 Tab by mouth every twelve (12) hours for 10 days. Qty: 20 Tab, Refills: 0         CONTINUE these medications which have CHANGED    Details   hydroCHLOROthiazide (HYDRODIURIL) 12.5 mg tablet Take 1 Tab by mouth daily. DO NOT TAKE for 5 DAYS. Do not take if dehydrated or not eating/drinking. Do not take if blood pressure is low (<548 systolic or <39 diastolic)  Qty: 30 Tab, Refills: 2    Associated Diagnoses: Hypertension, unspecified type      lisinopril (PRINIVIL, ZESTRIL) 10 mg tablet Take 1 Tab by mouth daily. DO NOT TAKE for 5 days  Qty: 30 Tab, Refills: 2    Associated Diagnoses: Hypertension, unspecified type         CONTINUE these medications which have NOT CHANGED    Details   magic mouthwash solution Take 15-30 mL by mouth four (4) times daily. Magic mouth wash   Maalox  Lidocaine 2% viscous   Diphenhydramine oral solution    Swish and spit for mouth pain, ok to swallow for throat pain     Pharmacy to mix equal portions of ingredients to a total volume as indicated in the dispense amount. Qty: 480 mL, Refills: 1    Associated Diagnoses: Pain, dental      oxyCODONE IR (ROXICODONE) 5 mg immediate release tablet Take 5 mg by mouth every six (6) hours as needed for Pain.       atorvastatin (LIPITOR) 10 mg tablet Take 10 mg by mouth nightly. prochlorperazine (COMPAZINE) 10 mg tablet Take 1 Tab by mouth every six (6) hours as needed. Qty: 45 Tab, Refills: 2    Associated Diagnoses: Follicular lymphoma grade IIIa of intra-abdominal lymph nodes (HCC)      LORazepam (ATIVAN) 0.5 mg tablet Take 1 Tab by mouth every eight (8) hours as needed. Max Daily Amount: 1.5 mg.  Qty: 60 Tab, Refills: 0    Associated Diagnoses: Anxiety      senna-docusate (PERICOLACE) 8.6-50 mg per tablet Take 1 Tab by mouth daily. Qty: 60 Tab, Refills: 3      ondansetron hcl (ZOFRAN) 8 mg tablet Take 1 Tab by mouth every eight (8) hours as needed for Nausea. Qty: 30 Tab, Refills: 2    Associated Diagnoses: Follicular lymphoma grade IIIa of intra-abdominal lymph nodes (HCC)      predniSONE (DELTASONE) 20 mg tablet Take 100 mg by mouth daily (with breakfast). (on days 1-5 of each chemo cycle)  Qty: 25 Tab, Refills: 5    Associated Diagnoses: Follicular lymphoma grade IIIa of intra-abdominal lymph nodes (HCC)      naloxone (NARCAN) 4 mg/actuation nasal spray Use 1 spray intranasally, then discard. Repeat with new spray every 2 min as needed for opioid overdose symptoms, alternating nostrils. Qty: 2 Each, Refills: 0      aspirin delayed-release (ASPIR-81) 81 mg tablet Take 1 Tab by mouth daily. Qty: 30 Tab, Refills: 1             Activity: See discharge instructions  Diet: See discharge instructions  Wound Care: See discharge instructions    Follow-up Information     Follow up With Specialties Details Why Contact Info    follow up with a primary care physician         follow up with a dentist or an oral surgeon as soon as possible        Chase Eason MD Hematology and Oncology In 1 week  [address]  [phone]            I spent 35 minutes on this discharge.     Signed:  Lillian Casillas MD  1/12/2019  8:06 AM

## 2019-01-12 NOTE — PROGRESS NOTES
I have reviewed discharge instructions with the patient. The patient verbalized understanding. Patient given the opportunity  to ask questions. Patient's port was de-accessed and flushed with normal saline and 300 units of herapin. Site is clean, dressing dry and intact.

## 2019-01-12 NOTE — PROGRESS NOTES
Otolaryngology - Head & Neck Surgery Progress Note        PATIENT NAME: Mark Barbosa  MRN: [id number]  DATE: 1/11/2019  ADMISSION DATE: 1/9/2019      Subjective:     Feeling better. Much less pain. Blood cultures negative. Objective:     Visit Vitals  /89 (BP 1 Location: Left arm, BP Patient Position: At rest)   Pulse 97   Temp 97.7 °F (36.5 °C)   Resp 18   Ht 5' 6\" (1.676 m)   Wt 65.8 kg (145 lb)   SpO2 97%   BMI 23.40 kg/m²     01/10 0701 - 01/11 1900  In: 5000.4 [P.O.:950; I.V.:4050.4]  Out: 1000 [Urine:1000]    Physical Exam:   General - awake, alert, NAD. Head and face - no edema or cellulitis. Oral - opening in gingiva right lower buccal aspect with scant purulent drainage. Assessment:     Odontogenic infection with spontaneous drainage. Plan:     Should be OK for discharge tomorrow on Augmentin 875 mg bid x 10 days as long as ANC >0.5. Must be evaluated to dentistry or OMFS upon discharge as this will likely become a recurrent issue unless he has dental extractions.        Hiwot Garay MD   [phone] - Office   [phone] - Cell

## 2019-01-15 LAB
BACTERIA SPEC CULT: NORMAL
BACTERIA SPEC CULT: NORMAL
SERVICE CMNT-IMP: NORMAL
SERVICE CMNT-IMP: NORMAL

## 2019-01-17 ENCOUNTER — OFFICE VISIT (OUTPATIENT)
Dept: ONCOLOGY | Age: 44
End: 2019-01-17

## 2019-01-17 ENCOUNTER — DOCUMENTATION ONLY (OUTPATIENT)
Dept: ONCOLOGY | Age: 44
End: 2019-01-17

## 2019-01-17 ENCOUNTER — HOSPITAL ENCOUNTER (OUTPATIENT)
Dept: INFUSION THERAPY | Age: 44
Discharge: HOME | End: 2019-01-17
Payer: MEDICAID

## 2019-01-17 VITALS
TEMPERATURE: 97.8 F | BODY MASS INDEX: 23.61 KG/M2 | HEART RATE: 74 BPM | DIASTOLIC BLOOD PRESSURE: 62 MMHG | OXYGEN SATURATION: 100 % | WEIGHT: 146.3 LBS | SYSTOLIC BLOOD PRESSURE: 96 MMHG | RESPIRATION RATE: 16 BRPM

## 2019-01-17 VITALS
SYSTOLIC BLOOD PRESSURE: 115 MMHG | OXYGEN SATURATION: 100 % | BODY MASS INDEX: 23.4 KG/M2 | TEMPERATURE: 97.8 F | HEIGHT: 66 IN | DIASTOLIC BLOOD PRESSURE: 75 MMHG | HEART RATE: 99 BPM

## 2019-01-17 DIAGNOSIS — Z51.81 ENCOUNTER FOR MONITORING CARDIOTOXIC DRUG THERAPY: ICD-10-CM

## 2019-01-17 DIAGNOSIS — F41.9 ANXIETY: ICD-10-CM

## 2019-01-17 DIAGNOSIS — Z79.899 ENCOUNTER FOR MONITORING CARDIOTOXIC DRUG THERAPY: ICD-10-CM

## 2019-01-17 DIAGNOSIS — C82.33 FOLLICULAR LYMPHOMA GRADE IIIA OF INTRA-ABDOMINAL LYMPH NODES (HCC): Primary | ICD-10-CM

## 2019-01-17 DIAGNOSIS — C82.90 FOLLICULAR LYMPHOMA, UNSPECIFIED FOLLICULAR LYMPHOMA TYPE, UNSPECIFIED BODY REGION (HCC): Primary | ICD-10-CM

## 2019-01-17 DIAGNOSIS — I10 HYPERTENSION, UNSPECIFIED TYPE: ICD-10-CM

## 2019-01-17 DIAGNOSIS — G89.3 ACUTE NEOPLASM-RELATED PAIN: ICD-10-CM

## 2019-01-17 DIAGNOSIS — Z51.11 CHEMOTHERAPY MANAGEMENT, ENCOUNTER FOR: ICD-10-CM

## 2019-01-17 DIAGNOSIS — R19.7 DIARRHEA, UNSPECIFIED TYPE: ICD-10-CM

## 2019-01-17 LAB
ANION GAP SERPL CALC-SCNC: 11 MMOL/L (ref 5–15)
BASOPHILS # BLD: 0 K/UL (ref 0–0.1)
BASOPHILS NFR BLD: 0 % (ref 0–1)
BUN SERPL-MCNC: 7 MG/DL (ref 6–20)
BUN/CREAT SERPL: 7 (ref 12–20)
CALCIUM SERPL-MCNC: 9 MG/DL (ref 8.5–10.1)
CHLORIDE SERPL-SCNC: 105 MMOL/L (ref 97–108)
CO2 SERPL-SCNC: 24 MMOL/L (ref 21–32)
CREAT SERPL-MCNC: 0.96 MG/DL (ref 0.7–1.3)
DIFFERENTIAL METHOD BLD: ABNORMAL
EOSINOPHIL # BLD: 0 K/UL (ref 0–0.4)
EOSINOPHIL NFR BLD: 0 % (ref 0–7)
ERYTHROCYTE [DISTWIDTH] IN BLOOD BY AUTOMATED COUNT: 13.9 % (ref 11.5–14.5)
GLUCOSE SERPL-MCNC: 103 MG/DL (ref 65–100)
HCT VFR BLD AUTO: 36.1 % (ref 36.6–50.3)
HGB BLD-MCNC: 12.1 G/DL (ref 12.1–17)
IMM GRANULOCYTES # BLD AUTO: 0 K/UL
IMM GRANULOCYTES NFR BLD AUTO: 0 %
LYMPHOCYTES # BLD: 2.8 K/UL (ref 0.8–3.5)
LYMPHOCYTES NFR BLD: 15 % (ref 12–49)
MCH RBC QN AUTO: 30.3 PG (ref 26–34)
MCHC RBC AUTO-ENTMCNC: 33.5 G/DL (ref 30–36.5)
MCV RBC AUTO: 90.3 FL (ref 80–99)
METAMYELOCYTES NFR BLD MANUAL: 6 %
MONOCYTES # BLD: 1.5 K/UL (ref 0–1)
MONOCYTES NFR BLD: 8 % (ref 5–13)
MYELOCYTES NFR BLD MANUAL: 3 %
NEUTS BAND NFR BLD MANUAL: 4 % (ref 0–6)
NEUTS SEG # BLD: 12.9 K/UL (ref 1.8–8)
NEUTS SEG NFR BLD: 64 % (ref 32–75)
NRBC # BLD: 0.02 K/UL (ref 0–0.01)
NRBC BLD-RTO: 0.1 PER 100 WBC
PLATELET # BLD AUTO: 291 K/UL (ref 150–400)
PMV BLD AUTO: 9.8 FL (ref 8.9–12.9)
POTASSIUM SERPL-SCNC: 4.2 MMOL/L (ref 3.5–5.1)
RBC # BLD AUTO: 4 M/UL (ref 4.1–5.7)
RBC MORPH BLD: ABNORMAL
SODIUM SERPL-SCNC: 140 MMOL/L (ref 136–145)
WBC # BLD AUTO: 18.9 K/UL (ref 4.1–11.1)

## 2019-01-17 PROCEDURE — 96415 CHEMO IV INFUSION ADDL HR: CPT

## 2019-01-17 PROCEDURE — 74011250636 HC RX REV CODE- 250/636: Performed by: INTERNAL MEDICINE

## 2019-01-17 PROCEDURE — 80048 BASIC METABOLIC PNL TOTAL CA: CPT

## 2019-01-17 PROCEDURE — 85025 COMPLETE CBC W/AUTO DIFF WBC: CPT

## 2019-01-17 PROCEDURE — 36415 COLL VENOUS BLD VENIPUNCTURE: CPT

## 2019-01-17 PROCEDURE — 77030012965 HC NDL HUBR BBMI -A

## 2019-01-17 PROCEDURE — 74011250637 HC RX REV CODE- 250/637: Performed by: INTERNAL MEDICINE

## 2019-01-17 PROCEDURE — 96413 CHEMO IV INFUSION 1 HR: CPT

## 2019-01-17 PROCEDURE — 96375 TX/PRO/DX INJ NEW DRUG ADDON: CPT

## 2019-01-17 RX ORDER — ACETAMINOPHEN 325 MG/1
650 TABLET ORAL ONCE
Status: COMPLETED | OUTPATIENT
Start: 2019-01-17 | End: 2019-01-17

## 2019-01-17 RX ORDER — SODIUM CHLORIDE 9 MG/ML
100 INJECTION, SOLUTION INTRAVENOUS CONTINUOUS
Status: DISPENSED | OUTPATIENT
Start: 2019-01-17 | End: 2019-01-17

## 2019-01-17 RX ORDER — SODIUM CHLORIDE 0.9 % (FLUSH) 0.9 %
10 SYRINGE (ML) INJECTION AS NEEDED
Status: ACTIVE | OUTPATIENT
Start: 2019-01-17 | End: 2019-01-17

## 2019-01-17 RX ORDER — SODIUM CHLORIDE 9 MG/ML
25 INJECTION, SOLUTION INTRAVENOUS CONTINUOUS
Status: DISPENSED | OUTPATIENT
Start: 2019-01-17 | End: 2019-01-17

## 2019-01-17 RX ORDER — SODIUM CHLORIDE 9 MG/ML
10 INJECTION INTRAMUSCULAR; INTRAVENOUS; SUBCUTANEOUS AS NEEDED
Status: ACTIVE | OUTPATIENT
Start: 2019-01-17 | End: 2019-01-17

## 2019-01-17 RX ORDER — HEPARIN 100 UNIT/ML
300-500 SYRINGE INTRAVENOUS AS NEEDED
Status: ACTIVE | OUTPATIENT
Start: 2019-01-17 | End: 2019-01-17

## 2019-01-17 RX ORDER — DIPHENHYDRAMINE HYDROCHLORIDE 50 MG/ML
50 INJECTION, SOLUTION INTRAMUSCULAR; INTRAVENOUS ONCE
Status: COMPLETED | OUTPATIENT
Start: 2019-01-17 | End: 2019-01-17

## 2019-01-17 RX ADMIN — OBINUTUZUMAB 1000 MG: 1000 INJECTION, SOLUTION, CONCENTRATE INTRAVENOUS at 11:09

## 2019-01-17 RX ADMIN — Medication 500 UNITS: at 14:48

## 2019-01-17 RX ADMIN — ACETAMINOPHEN 650 MG: 325 TABLET ORAL at 09:54

## 2019-01-17 RX ADMIN — DIPHENHYDRAMINE HYDROCHLORIDE 50 MG: 50 INJECTION INTRAMUSCULAR; INTRAVENOUS at 09:55

## 2019-01-17 RX ADMIN — SODIUM CHLORIDE 500 ML: 900 INJECTION, SOLUTION INTRAVENOUS at 09:54

## 2019-01-17 RX ADMIN — SODIUM CHLORIDE 100 ML/HR: 900 INJECTION, SOLUTION INTRAVENOUS at 11:09

## 2019-01-17 NOTE — PATIENT INSTRUCTIONS
NEW Prednisone instructions: Take Prednisone 100mg by mouth daily in the mornings on days 1-5 of each chemotherapy cycle. Cycle 2 starts on 1/24/19, so you will take your Prednisone 1/24/19 - 1/28/19.     If no delays,   Cycle 3 starts on 2/14/19 and Prednisone will be 2/14 - 2/18/19

## 2019-01-17 NOTE — PROGRESS NOTES
[facility] Progress Note    Date: 2019    Name: Erma Mendosakeeper    MRN: [id number]         : 1975    Mr. Nan Marcelo Arrived ambulatory and in no distress for cycle 1 day 15 of Gazyva regimen. Assessment was completed, no acute issues at this time, pt recently hospitalized for neutropenic fevers currently on antibiotics. Right chest port accessed without difficulty, labs drawn and in process. Chemotherapy Flowsheet 2019   Cycle C1D15   Date 2019   Drug / Regimen Gazyva   Notes -         0820  Proceeded to appt with Dr. Mira Mireles. Mr. Demetrio Delgado vitals were reviewed. Patient Vitals for the past 12 hrs:   Temp Pulse Resp BP SpO2   19 1449 97.8 °F (36.6 °C) 74 16 96/62 --   19 1410 -- 76 16 94/67 --   19 1341 97.2 °F (36.2 °C) 72 16 94/55 --   19 1240 96.4 °F (35.8 °C) 74 16 102/61 --   19 1210 97.9 °F (36.6 °C) 76 -- 93/62 --   19 1140 97.1 °F (36.2 °C) 78 -- 106/61 100 %   19 0817 97.8 °F (36.6 °C) 99 -- 115/75 100 %     Lab results were obtained and reviewed. Recent Results (from the past 12 hour(s))   CBC WITH AUTOMATED DIFF    Collection Time: 19  8:20 AM   Result Value Ref Range    WBC 18.9 (H) 4.1 - 11.1 K/uL    RBC 4.00 (L) 4.10 - 5.70 M/uL    HGB 12.1 12.1 - 17.0 g/dL    HCT 36.1 (L) 36.6 - 50.3 %    MCV 90.3 80.0 - 99.0 FL    MCH 30.3 26.0 - 34.0 PG    MCHC 33.5 30.0 - 36.5 g/dL    RDW 13.9 11.5 - 14.5 %    PLATELET 565 921 - 023 K/uL    MPV 9.8 8.9 - 12.9 FL    NRBC 0.1 (H) 0  WBC    ABSOLUTE NRBC 0.02 (H) 0.00 - 0.01 K/uL    NEUTROPHILS 64 32 - 75 %    BAND NEUTROPHILS 4 0 - 6 %    LYMPHOCYTES 15 12 - 49 %    MONOCYTES 8 5 - 13 %    EOSINOPHILS 0 0 - 7 %    BASOPHILS 0 0 - 1 %    METAMYELOCYTES 6 (H) 0 %    MYELOCYTES 3 (H) 0 %    IMMATURE GRANULOCYTES 0 %    ABS. NEUTROPHILS 12.9 (H) 1.8 - 8.0 K/UL    ABS. LYMPHOCYTES 2.8 0.8 - 3.5 K/UL    ABS. MONOCYTES 1.5 (H) 0.0 - 1.0 K/UL    ABS. EOSINOPHILS 0.0 0.0 - 0.4 K/UL    ABS.  BASOPHILS 0.0 0.0 - 0.1 K/UL    ABS. IMM. GRANS. 0.0 K/UL    DF MANUAL      RBC COMMENTS NORMOCYTIC, NORMOCHROMIC     METABOLIC PANEL, BASIC    Collection Time: 01/17/19  8:20 AM   Result Value Ref Range    Sodium 140 136 - 145 mmol/L    Potassium 4.2 3.5 - 5.1 mmol/L    Chloride 105 97 - 108 mmol/L    CO2 24 21 - 32 mmol/L    Anion gap 11 5 - 15 mmol/L    Glucose 103 (H) 65 - 100 mg/dL    BUN 7 6 - 20 MG/DL    Creatinine 0.96 0.70 - 1.30 MG/DL    BUN/Creatinine ratio 7 (L) 12 - 20      GFR est AA >60 >60 ml/min/1.73m2    GFR est non-AA >60 >60 ml/min/1.73m2    Calcium 9.0 8.5 - 10.1 MG/DL     Pre-medications  were administered as ordered and chemotherapy was initiated. NS bolus  Tylenol PO  Benadryl IVP  Gazyva IV titrated per protocol      Mr. Majo Rodriguez tolerated treatment well and was discharged from [place] [address] in stable condition at 1420. He is to return on 1/24/19 for his next appointment.     Kim Becerril  January 17, 2019

## 2019-01-17 NOTE — PROGRESS NOTES
[address] [facility] On[facility]  [phone]    Hematology / Oncology Established Visit    Reason for Visit:   Brianne Haynes is a 41 y.o. male who comes in for f/u of lymphoma. Hematology Oncology Treatment History:     Diagnosis: Follicular lymphoma    Stage: IV    Pathology:   11/13/18 right inguinal LN excision: Follicular lymphoma, high-grade (grade 3a of 3). Comment   The delaney architecture is entirely effaced by atypical lymphocytic proliferation with prominent nodular growth pattern. The majority of the atypical lymphocytes are small to medium in size and have irregular nuclear outlines and inconspicuous nucleoli, morphologically consistent with centrocytes. Scattered large lymphocytes with prominent nucleoli, consistent with centroblasts are identified (> 15 per high-power field). Focal increase in mitotic figures is noted. Occasional follicular dendritic cells are seen. By immunohistochemistry, the atypical lymphocytes are positive for CD20, PAX5, CD10, BCL6 and BCL2 (weak, focal) and negative for MUM1. CD3, CD5 and CD43 stain numerous background T lymphocytes. Ki-67 reveals a high proliferation index in the neoplastic follicles (overall 29-19%). Clinical history indicates 14 cm retroperitoneal mass with bilateral inguinal lymphadenopathy. In summary, the combined morphologic and phenotypic findings are diagnostic of a high-grade follicular lymphoma (grade 3a of 3). There is no evidence of diffuse large B-cell lymphoma. Flow cytometry analysis:   Monoclonal B-cell population (47 % of all cells) with mild increase in side and forward scatter properties expressing CD19, CD20, CD23 and CD10 with surface lambda light chain restriction. No phenotypically aberrant T-cell population. Flow cytometry was performed at [facility]     Prior Treatment: None    Current Treatment: Obinutuzumab-CHOP.  Obinutuzumab: 1000 mg weekly on days 1, 8, 15 for cycle 1, then 1000 mg on day 1 q21 days for cycles 2-6, then monotherapy 1000 mg every 21 days for cycle 7, 8 with Cyclophosphamide 750mg/m2, Doxorubicin 50mg/m2, Vincristine 1.4mg/m2 on day 1 and Prednisone 100mg on Days 1-5, every 21 days for a total of 6 cycles. History of Present Illness:   Mr. Zapata is a 42 y/o male with HTN who comes in for evaluation of Follicular lymphoma. He states he was in his usual state of health in early November 2018, but then he developed low back pain and presented to the ER. The pain was described as constant, radiating to the buttocks, without exacerbating or alleviating factors, associated w/ increased urination, no n/v/d, no dysuria, no fever, he's unsure about weight loss. Work-up at outside hospital revealed a retroperitoneal mass seen on CT imaging, and he was transferred to [facility] for further work-up. CT there showed a large retroperitoneal mass encircling the aorta with invasion of the left renal hilum and left adrenal gland. There were bilateral inguinal lymph nodes and moderate left hydronephrosis. He was evaluated while at [facility] and was noted to have palpable nodes in his groin for approximately the past 1 month. No testicular mass, anorexia, weight loss, fevers, night sweats, chest pain, shortness of breath, abdominal pain or nausea. He underwent excisional LN biopsy of right inguinal LN. He was told that he had likely lymphoma. He was advised to follow up as outpatient for PET scan, bone marrow biopsy. However, patient states he was lost to f/u. He never received any calls or appointment details. His aunt urged patient to seek care elsewhere and his PCP recommended [facility]. At our first meeting on 12/13/18, he tells me that he was working full time, feeling well until early Nov 2018. When he developed the left lower back pain, he went to [facility] and [address] [place] [address]. No fevers, chills, night sweats. He has lost 15 lbs in the past month due to low appetite. No n/v/d.  No current constipation. No CP, SOB. No h/o cardiac disease aside from HTN, Hyperlipiemia. No hematochezia/melena, hematuria. He has HTN and XOL, which he was taking meds for, but has run out. Has no PCP currently. He is uninsured. He works as a cook, currently working part time. He has children aged 12 and 13. Interval history: Today, patient comes in for follow up after recent hospital admission for neutropenic fever on 1/9/19. He was noted to have very poor dentition, erythema of gums and swelling of left jaw, right cheek. Patient completed a course of IV antibiotics and was discharged on 1/12/19 with Augmentin 875mg BID. Delayed C1D15 treatment given recent admission. Patient now feeling better without any fevers, chills, n/v, gum pain. Reports diarrhea 2-3 x per day, no constipation. Pain is a 3/10 lower back, controlled with pain medications. No gum pain. He reports a good appetite, eating three meals a day. He is not drinking a lot of water, prefers mountain dew. FAMILY HISTORY:  His father has a history of leukemia, currently in remission, [place] [facility] LYMPHATICS:  Bilateral palpable inguinal adenopathy. Past Medical History:   Diagnosis Date    Anxiety     Hyperlipidemia     Hypertension     Lymphadenopathy 11/12/2018      History reviewed. No pertinent surgical history. Social History     Tobacco Use    Smoking status: Current Every Day Smoker     Packs/day: 1.00     Years: 20.00     Pack years: 20.00    Smokeless tobacco: Never Used   Substance Use Topics    Alcohol use: No     Frequency: Never      Family History   Problem Relation Age of Onset    Hypertension Father     Diabetes Mother      Current Outpatient Medications   Medication Sig    hydroCHLOROthiazide (HYDRODIURIL) 12.5 mg tablet Take 1 Tab by mouth daily. DO NOT TAKE for 5 DAYS. Do not take if dehydrated or not eating/drinking.  Do not take if blood pressure is low (<453 systolic or <39 diastolic)    lisinopril (PRINIVIL, ZESTRIL) 10 mg tablet Take 1 Tab by mouth daily. DO NOT TAKE for 5 days    L. acidoph & paracasei- S therm- Bifido (AUSTIN-Q/RISAQUAD) 8 billion cell cap cap Take 1 Cap by mouth daily.  amoxicillin-clavulanate (AUGMENTIN) 875-125 mg per tablet Take 1 Tab by mouth every twelve (12) hours for 10 days.  magic mouthwash solution Take 15-30 mL by mouth four (4) times daily. Magic mouth wash   Maalox  Lidocaine 2% viscous   Diphenhydramine oral solution    Swish and spit for mouth pain, ok to swallow for throat pain     Pharmacy to mix equal portions of ingredients to a total volume as indicated in the dispense amount.  oxyCODONE IR (ROXICODONE) 5 mg immediate release tablet Take 5 mg by mouth every six (6) hours as needed for Pain.  atorvastatin (LIPITOR) 10 mg tablet Take 10 mg by mouth nightly.  prochlorperazine (COMPAZINE) 10 mg tablet Take 1 Tab by mouth every six (6) hours as needed.  LORazepam (ATIVAN) 0.5 mg tablet Take 1 Tab by mouth every eight (8) hours as needed. Max Daily Amount: 1.5 mg.    senna-docusate (PERICOLACE) 8.6-50 mg per tablet Take 1 Tab by mouth daily.  ondansetron hcl (ZOFRAN) 8 mg tablet Take 1 Tab by mouth every eight (8) hours as needed for Nausea.  predniSONE (DELTASONE) 20 mg tablet Take 100 mg by mouth daily (with breakfast). (on days 1-5 of each chemo cycle)    naloxone (NARCAN) 4 mg/actuation nasal spray Use 1 spray intranasally, then discard. Repeat with new spray every 2 min as needed for opioid overdose symptoms, alternating nostrils.  aspirin delayed-release (ASPIR-81) 81 mg tablet Take 1 Tab by mouth daily. No current facility-administered medications for this visit. No Known Allergies     Review of Systems: A complete review of systems was obtained, negative except as described above.     Physical Exam:     Visit Vitals  /75   Pulse 99   Temp 97.8 °F (36.6 °C)   Ht 5' 6\" (1.676 m)   SpO2 100%   BMI 23.40 kg/m²     ECOG PS: 0  General: Well developed, no acute distress  Eyes: PERRLA, EOMI, anicteric sclerae  HENT: Atraumatic, OP clear, poor dentition, multiple dental caries, no erythema,TMs intact without erythema  Neck: Supple  Lymphatic: No supraclavicular, axillary. R cervical 2cm LN noted. Bilateral small 2-3cm palpable inguinal adenopathy  Respiratory: CTAB, normal respiratory effort  CV: Normal rate, regular rhythm, no murmurs, no peripheral edema  GI: Soft, nontender, nondistended, no masses, no hepatomegaly, no splenomegaly  MS: Normal gait and station. Digits without clubbing or cyanosis. Skin: No rashes, ecchymoses, or petechiae. Normal temperature, turgor, and texture. Neuro/Psych: Alert, oriented. 5/5 strength in all 4 extremities. Appropriate affect, normal judgment/insight. Results:     Lab Results   Component Value Date/Time    WBC 18.9 (H) 01/17/2019 08:20 AM    HGB 12.1 01/17/2019 08:20 AM    HCT 36.1 (L) 01/17/2019 08:20 AM    PLATELET 238 94/67/3877 08:20 AM    MCV 90.3 01/17/2019 08:20 AM    ABS.  NEUTROPHILS PENDING 01/17/2019 08:20 AM    Hemoglobin (POC) 15.0 06/05/2009 02:13 PM    Hematocrit (POC) 44 06/05/2009 02:13 PM     Lab Results   Component Value Date/Time    Sodium 140 01/17/2019 08:20 AM    Potassium 4.2 01/17/2019 08:20 AM    Chloride 105 01/17/2019 08:20 AM    CO2 24 01/17/2019 08:20 AM    Glucose 103 (H) 01/17/2019 08:20 AM    BUN 7 01/17/2019 08:20 AM    Creatinine 0.96 01/17/2019 08:20 AM    GFR est AA >60 01/17/2019 08:20 AM    GFR est non-AA >60 01/17/2019 08:20 AM    Calcium 9.0 01/17/2019 08:20 AM    Sodium (POC) 139 06/05/2009 02:13 PM    Potassium (POC) 3.9 06/05/2009 02:13 PM    Chloride (POC) 103 06/05/2009 02:13 PM    Glucose (POC) 98 06/05/2009 02:13 PM    BUN (POC) 9 06/05/2009 02:13 PM    Creatinine (POC) 1.0 06/05/2009 02:13 PM    Calcium, ionized (POC) 1.21 06/05/2009 02:13 PM     Lab Results   Component Value Date/Time    Bilirubin, total 0.3 01/11/2019 03:27 AM    ALT (SGPT) 30 2019 03:27 AM    AST (SGOT) 12 (L) 2019 03:27 AM    Alk. phosphatase 70 2019 03:27 AM    Protein, total 5.4 (L) 2019 03:27 AM    Albumin 2.0 (L) 2019 03:27 AM    Globulin 3.4 2019 03:27 AM     No results found for: IRON, FE, TIBC, IBCT, PSAT, FERR    No results found for: B12LT, FOL, RBCF  Lab Results   Component Value Date/Time    TSH 1.53 2016 04:40 AM     18:     Lab Results   Component Value Date/Time    Hepatitis A, IgM NONREACTIVE 2018 04:53 PM    Hepatitis B surface Ag <0.10 2018 04:53 PM    Hepatitis B core, IgM NONREACTIVE 2018 04:53 PM         Imagin/9/18 Abd/pelvis CT: IMPRESSION:  1. Interval development of a large retroperitoneal mass encircling the aorta with invasion of the left renal hilum and left adrenal gland. Several adjacent lymph nodes are seen extending into the peritoneum and underneath the  diaphragmatic natalie. This most likely represents lymphoma. 2. Several new bilateral enlarged inguinal lymph nodes also likely representing lymphoma. 3. Moderate left hydronephrosis with a delayed renal nephrogram related to decreased renal function. This is related to the invasion of the renal hilum. 18 Chest CT: IMPRESSION:  Trace left pleural effusion. Bilateral lower lobe atelectasis. Large  retroperitoneal mass lesion again demonstrated. PET 18:  FINDINGS:  HEAD/NECK: Right palatine tonsil intense hypermetabolism, max SUV 18. Multilevel  bilateral cervical adenopathy, with max SUV 12 in a left supraclavicular node. Cerebral evaluation is limited by normal intense activity. CHEST: Solitary hypermetabolic left axillary node, max SUV 11. ABDOMEN/PELVIS: Bulky retroperitoneal mass max SUV 27, with several additional  small active abdomino-pelvic nodes. Bilateral inguinal nodes with max SUV 12 on  the left.   SKELETON: No foci of abnormal hypermetabolism in the axial and visualized  appendicular skeleton. IMPRESSION:   1. Right palatine tonsil tumor involvement (Deauville 5). 2. Bilateral cervical delaney involvement (Deauville 5). 3. Left axillary node involvement (Deauville 5). 4. Bulky retroperitoneal lymphoma mass and additional smaller hypermetabolic  abdomino-pelvic nodes (Deauville 5). 5. Bilateral inguinal delaney involvement (Deauville 5). Deauville Five Point Scale  1. No uptake or no residual uptake (when used interim)  2. Slight uptake, but below blood pool (mediastinum)  3. Uptake above mediastinal, but below/equal to uptake in the liver  4. Uptake slightly to moderately higher than liver  5. Markedly increased uptake or any new lesion (on response evaluation)  Each FDG-avid (or previously FDG avid) lesion is rated independently. Reference values:  Mediastinal blood pool: 2.1 SUV  Liver (background): 2.2 SUV    Assessment & Plan:   Ronald Lin is a 41 y.o. male comes in for evaluation and management of lymphoma. 1. Follicular lymphoma: Grade 3a. Although grade 3a disease is considered more indolent and can be treated like grade 1/2 disease, this patient has indications for treatment: bulky disease encircling the aorta causing symptoms. Bone marrow negative for lymphoma, but was hypercellular. BR better than RCHOP, but based on GALLIUM study, Obinutuzumab-based induction and maintenance prolongs PFS over that seen with rituximab-based therapy. Therefore, I have chosen to treat patient with O-CHOP regimen for 6 cycles, followed by possible maintenance Obinutuzumab. We discussed the risks and benefits of O-CHOP chemotherapy. The potential side effects include, but are not limited to: nausea, vomiting, diarrhea, constipation, Flu-like symptoms, infusion reaction, allergic reaction, flushing, taste changes, increased risk of infection, anemia, fatigue, alopecia, neuropathy, nail changes, cardiac damage, mucositis, myleosuppression, infertility and rarely, death.  Patient completed chemoteaching and received a chemotherapy education folder. -- Proceed with cycle 1,  Day 15 of O-CHOP regimen today   -- Return in 1 week (1/24/19) for cycle 2, day 1 of O-CHOP  -- Add Neulasta patch with cycle 2  -- Reminded pt about Prednisone 100mg PO daily on days 1-5 to start on 1/24/19    2. Use of cardiotoxic chemotherapy: Discussed risks/benefits of using doxorubicin. Echo on 12/17/18 showed EF 65%. -- Referred to Cardiology    3. Neoplasm related pain / Anxiety: Left lower back pain. Taking Oxycodone 5mg bid prn. Signed pain contract on 12/28/18. Counseled on adding SSRI if anxiety becomes worse. -- Continue Oxycodone 5mg every 6hrs prn for pain, 60 tabs given and stool softners  -- Ativan 0.5mg bid prn, 60 tabs written 12/28/18    4. HTN / Hyperlipidemia: Uncontrolled as pt has been out of his meds for 1 weeks. -- On Lisinopril, HCTZ, Lipitor. 5. Tobacco abuse: Counseled on risk of smoking, benefits of quitting. Discussed cessation strategies. Still smoking a pack per day. 6. Dental infection: Caused neutropenic fever and hospital admission recently. No abscess. Blood cultures negative. Treated with IV antibiotics followed by Augmentin. -- Complete the course of Augmentin 875mg bid  -- Dental evaluation vs OMFS recommended. Alvaro Tejada in [address] met with him about affordable dental care options. 7. Diarrhea: 2 loose stools per day. Most likely form antibiotics, advise he drink fluids and take probiotics. Emotional well being: Pt is coping well with his/her disease and has excellent support. Met with SW.    I appreciate the opportunity to participate in Mr. Dimas cabezas.     Signed By: Stacia Pang MD     January 17, 2019

## 2019-01-17 NOTE — PROGRESS NOTES
[address]  Social Work Navigator Encounter     Patient Name:  Sunil Meyers    Medical History: lymphoma    Narrative: Patient returns to clinic for follow up. Met with patient and provided him and list of low cost dental clinics. Encouraged patient to contact the clinics ASAP as they mostly will require a financial screening and might have a waiting list. Patient voiced he has and phone interview with Alex JOLENE Virgen on Feb 4th. Stressed the importance of completing the interview or notifying Social Security ahead of time if he needs to reschedule. Patient contacted [facility] regarding denied Medicaid application on 1/8 - they will contact him back within 14 days. Patient tells me his is doing \"okay\" and feeling much better since hospitalization. Patient's father continues to be a source of support for patient. Encouraged patient to contact me as needed. Barriers to Care: uninsured, low income    Plan:   1. Care Card application submitted - Jose De Jesus Roles assisting  2. Patient has phone interview with SS Disability on 2/4   3. Patient following up with  re denied [employer]  4.  Provided patient with list of low cost dental clinic options for his to contact to schedule an appointment    Thank you,  MARLENA Grant

## 2019-01-24 ENCOUNTER — OFFICE VISIT (OUTPATIENT)
Dept: ONCOLOGY | Age: 44
End: 2019-01-24

## 2019-01-24 ENCOUNTER — HOSPITAL ENCOUNTER (OUTPATIENT)
Dept: INFUSION THERAPY | Age: 44
Discharge: HOME | End: 2019-01-24
Payer: MEDICAID

## 2019-01-24 VITALS
TEMPERATURE: 97.7 F | OXYGEN SATURATION: 97 % | BODY MASS INDEX: 22.63 KG/M2 | HEART RATE: 105 BPM | HEIGHT: 67 IN | WEIGHT: 144.2 LBS | SYSTOLIC BLOOD PRESSURE: 114 MMHG | DIASTOLIC BLOOD PRESSURE: 85 MMHG

## 2019-01-24 VITALS
HEIGHT: 67 IN | HEART RATE: 80 BPM | WEIGHT: 144.2 LBS | TEMPERATURE: 98 F | BODY MASS INDEX: 22.63 KG/M2 | SYSTOLIC BLOOD PRESSURE: 103 MMHG | RESPIRATION RATE: 16 BRPM | DIASTOLIC BLOOD PRESSURE: 66 MMHG

## 2019-01-24 DIAGNOSIS — F41.9 ANXIETY: ICD-10-CM

## 2019-01-24 DIAGNOSIS — G89.3 ACUTE NEOPLASM-RELATED PAIN: ICD-10-CM

## 2019-01-24 DIAGNOSIS — C82.33 FOLLICULAR LYMPHOMA GRADE IIIA OF INTRA-ABDOMINAL LYMPH NODES (HCC): Primary | ICD-10-CM

## 2019-01-24 DIAGNOSIS — C82.90 FOLLICULAR LYMPHOMA, UNSPECIFIED FOLLICULAR LYMPHOMA TYPE, UNSPECIFIED BODY REGION (HCC): Primary | ICD-10-CM

## 2019-01-24 DIAGNOSIS — R19.7 DIARRHEA, UNSPECIFIED TYPE: ICD-10-CM

## 2019-01-24 DIAGNOSIS — Z79.899 ENCOUNTER FOR MONITORING CARDIOTOXIC DRUG THERAPY: ICD-10-CM

## 2019-01-24 DIAGNOSIS — Z51.81 ENCOUNTER FOR MONITORING CARDIOTOXIC DRUG THERAPY: ICD-10-CM

## 2019-01-24 DIAGNOSIS — Z51.11 CHEMOTHERAPY MANAGEMENT, ENCOUNTER FOR: ICD-10-CM

## 2019-01-24 LAB
ALBUMIN SERPL-MCNC: 3.3 G/DL (ref 3.5–5)
ALBUMIN/GLOB SERPL: 0.8 {RATIO} (ref 1.1–2.2)
ALP SERPL-CCNC: 87 U/L (ref 45–117)
ALT SERPL-CCNC: 26 U/L (ref 12–78)
ANION GAP SERPL CALC-SCNC: 10 MMOL/L (ref 5–15)
AST SERPL-CCNC: 19 U/L (ref 15–37)
BASOPHILS # BLD: 0.1 K/UL (ref 0–0.1)
BASOPHILS NFR BLD: 1 % (ref 0–1)
BILIRUB SERPL-MCNC: 0.1 MG/DL (ref 0.2–1)
BUN SERPL-MCNC: 8 MG/DL (ref 6–20)
BUN/CREAT SERPL: 7 (ref 12–20)
CALCIUM SERPL-MCNC: 9.5 MG/DL (ref 8.5–10.1)
CHLORIDE SERPL-SCNC: 104 MMOL/L (ref 97–108)
CO2 SERPL-SCNC: 26 MMOL/L (ref 21–32)
CREAT SERPL-MCNC: 1.17 MG/DL (ref 0.7–1.3)
DIFFERENTIAL METHOD BLD: ABNORMAL
EOSINOPHIL # BLD: 0 K/UL (ref 0–0.4)
EOSINOPHIL NFR BLD: 0 % (ref 0–7)
ERYTHROCYTE [DISTWIDTH] IN BLOOD BY AUTOMATED COUNT: 14.1 % (ref 11.5–14.5)
GLOBULIN SER CALC-MCNC: 3.9 G/DL (ref 2–4)
GLUCOSE SERPL-MCNC: 150 MG/DL (ref 65–100)
HCT VFR BLD AUTO: 39.2 % (ref 36.6–50.3)
HGB BLD-MCNC: 13.2 G/DL (ref 12.1–17)
IMM GRANULOCYTES # BLD AUTO: 0 K/UL (ref 0–0.04)
IMM GRANULOCYTES NFR BLD AUTO: 0 % (ref 0–0.5)
LYMPHOCYTES # BLD: 1.3 K/UL (ref 0.8–3.5)
LYMPHOCYTES NFR BLD: 13 % (ref 12–49)
MCH RBC QN AUTO: 30.1 PG (ref 26–34)
MCHC RBC AUTO-ENTMCNC: 33.7 G/DL (ref 30–36.5)
MCV RBC AUTO: 89.5 FL (ref 80–99)
MONOCYTES # BLD: 0.4 K/UL (ref 0–1)
MONOCYTES NFR BLD: 4 % (ref 5–13)
NEUTS SEG # BLD: 8 K/UL (ref 1.8–8)
NEUTS SEG NFR BLD: 81 % (ref 32–75)
NRBC # BLD: 0 K/UL (ref 0–0.01)
NRBC BLD-RTO: 0 PER 100 WBC
PLATELET # BLD AUTO: 194 K/UL (ref 150–400)
PMV BLD AUTO: 11.4 FL (ref 8.9–12.9)
POTASSIUM SERPL-SCNC: 3.9 MMOL/L (ref 3.5–5.1)
PROT SERPL-MCNC: 7.2 G/DL (ref 6.4–8.2)
RBC # BLD AUTO: 4.38 M/UL (ref 4.1–5.7)
SODIUM SERPL-SCNC: 140 MMOL/L (ref 136–145)
WBC # BLD AUTO: 9.9 K/UL (ref 4.1–11.1)

## 2019-01-24 PROCEDURE — 74011250636 HC RX REV CODE- 250/636: Performed by: INTERNAL MEDICINE

## 2019-01-24 PROCEDURE — 74011250637 HC RX REV CODE- 250/637: Performed by: INTERNAL MEDICINE

## 2019-01-24 PROCEDURE — 74011000258 HC RX REV CODE- 258: Performed by: INTERNAL MEDICINE

## 2019-01-24 PROCEDURE — 80053 COMPREHEN METABOLIC PANEL: CPT

## 2019-01-24 PROCEDURE — 96377 APPLICATON ON-BODY INJECTOR: CPT

## 2019-01-24 PROCEDURE — 96368 THER/DIAG CONCURRENT INF: CPT

## 2019-01-24 PROCEDURE — 74011250636 HC RX REV CODE- 250/636

## 2019-01-24 PROCEDURE — 96375 TX/PRO/DX INJ NEW DRUG ADDON: CPT

## 2019-01-24 PROCEDURE — 96366 THER/PROPH/DIAG IV INF ADDON: CPT

## 2019-01-24 PROCEDURE — 96413 CHEMO IV INFUSION 1 HR: CPT

## 2019-01-24 PROCEDURE — 77030012965 HC NDL HUBR BBMI -A

## 2019-01-24 PROCEDURE — 84484 ASSAY OF TROPONIN QUANT: CPT

## 2019-01-24 PROCEDURE — 36415 COLL VENOUS BLD VENIPUNCTURE: CPT

## 2019-01-24 PROCEDURE — 96411 CHEMO IV PUSH ADDL DRUG: CPT

## 2019-01-24 PROCEDURE — 85025 COMPLETE CBC W/AUTO DIFF WBC: CPT

## 2019-01-24 PROCEDURE — 96415 CHEMO IV INFUSION ADDL HR: CPT

## 2019-01-24 RX ORDER — PREDNISONE 20 MG/1
100 TABLET ORAL DAILY
Refills: 0 | Status: SHIPPED | COMMUNITY
Start: 2019-01-24 | End: 2019-02-14 | Stop reason: SDUPTHER

## 2019-01-24 RX ORDER — DOXORUBICIN HYDROCHLORIDE 2 MG/ML
25 INJECTION, SOLUTION INTRAVENOUS ONCE
Status: CANCELLED
Start: 2019-02-14 | End: 2019-02-14

## 2019-01-24 RX ORDER — ACETAMINOPHEN 325 MG/1
650 TABLET ORAL ONCE
Status: CANCELLED
Start: 2019-01-24 | End: 2019-01-24

## 2019-01-24 RX ORDER — HEPARIN 100 UNIT/ML
300-500 SYRINGE INTRAVENOUS AS NEEDED
Status: CANCELLED
Start: 2019-02-14

## 2019-01-24 RX ORDER — SODIUM CHLORIDE 0.9 % (FLUSH) 0.9 %
10 SYRINGE (ML) INJECTION AS NEEDED
Status: CANCELLED
Start: 2019-01-24

## 2019-01-24 RX ORDER — LORAZEPAM 0.5 MG/1
0.5 TABLET ORAL
Qty: 60 TAB | Refills: 0 | Status: SHIPPED | OUTPATIENT
Start: 2019-01-24 | End: 2019-02-19 | Stop reason: CLARIF

## 2019-01-24 RX ORDER — SODIUM CHLORIDE 9 MG/ML
10 INJECTION INTRAMUSCULAR; INTRAVENOUS; SUBCUTANEOUS AS NEEDED
Status: CANCELLED | OUTPATIENT
Start: 2019-01-24

## 2019-01-24 RX ORDER — SODIUM CHLORIDE 9 MG/ML
25 INJECTION, SOLUTION INTRAVENOUS CONTINUOUS
Status: CANCELLED | OUTPATIENT
Start: 2019-01-24

## 2019-01-24 RX ORDER — ACETAMINOPHEN 325 MG/1
650 TABLET ORAL AS NEEDED
Status: CANCELLED
Start: 2019-02-14

## 2019-01-24 RX ORDER — ACETAMINOPHEN 325 MG/1
650 TABLET ORAL AS NEEDED
Status: CANCELLED
Start: 2019-01-24

## 2019-01-24 RX ORDER — SODIUM CHLORIDE 0.9 % (FLUSH) 0.9 %
10 SYRINGE (ML) INJECTION AS NEEDED
Status: CANCELLED
Start: 2019-02-14

## 2019-01-24 RX ORDER — ACETAMINOPHEN 325 MG/1
650 TABLET ORAL ONCE
Status: CANCELLED
Start: 2019-02-14 | End: 2019-02-14

## 2019-01-24 RX ORDER — ONDANSETRON 2 MG/ML
8 INJECTION INTRAMUSCULAR; INTRAVENOUS AS NEEDED
Status: CANCELLED | OUTPATIENT
Start: 2019-02-14

## 2019-01-24 RX ORDER — PALONOSETRON 0.05 MG/ML
0.25 INJECTION, SOLUTION INTRAVENOUS ONCE
Status: CANCELLED
Start: 2019-02-14 | End: 2019-02-14

## 2019-01-24 RX ORDER — PALONOSETRON 0.05 MG/ML
0.25 INJECTION, SOLUTION INTRAVENOUS ONCE
Status: CANCELLED
Start: 2019-01-24 | End: 2019-01-24

## 2019-01-24 RX ORDER — PREDNISONE 1 MG/1
100 TABLET ORAL
Status: CANCELLED
Start: 2019-02-14 | End: 2019-02-14

## 2019-01-24 RX ORDER — SODIUM CHLORIDE 9 MG/ML
25 INJECTION, SOLUTION INTRAVENOUS CONTINUOUS
Status: DISPENSED | OUTPATIENT
Start: 2019-01-24 | End: 2019-01-24

## 2019-01-24 RX ORDER — PREDNISONE 1 MG/1
100 TABLET ORAL
Status: CANCELLED
Start: 2019-01-24 | End: 2019-01-24

## 2019-01-24 RX ORDER — DIPHENHYDRAMINE HYDROCHLORIDE 50 MG/ML
50 INJECTION, SOLUTION INTRAMUSCULAR; INTRAVENOUS AS NEEDED
Status: CANCELLED
Start: 2019-02-14

## 2019-01-24 RX ORDER — HEPARIN 100 UNIT/ML
300-500 SYRINGE INTRAVENOUS AS NEEDED
Status: ACTIVE | OUTPATIENT
Start: 2019-01-24 | End: 2019-01-24

## 2019-01-24 RX ORDER — ONDANSETRON 2 MG/ML
8 INJECTION INTRAMUSCULAR; INTRAVENOUS AS NEEDED
Status: CANCELLED | OUTPATIENT
Start: 2019-01-24

## 2019-01-24 RX ORDER — DIPHENHYDRAMINE HYDROCHLORIDE 50 MG/ML
50 INJECTION, SOLUTION INTRAMUSCULAR; INTRAVENOUS ONCE
Status: CANCELLED
Start: 2019-02-14 | End: 2019-02-14

## 2019-01-24 RX ORDER — HYDROCORTISONE SODIUM SUCCINATE 100 MG/2ML
100 INJECTION, POWDER, FOR SOLUTION INTRAMUSCULAR; INTRAVENOUS AS NEEDED
Status: CANCELLED | OUTPATIENT
Start: 2019-01-24

## 2019-01-24 RX ORDER — PALONOSETRON 0.05 MG/ML
0.25 INJECTION, SOLUTION INTRAVENOUS ONCE
Status: COMPLETED | OUTPATIENT
Start: 2019-01-24 | End: 2019-01-24

## 2019-01-24 RX ORDER — SODIUM CHLORIDE 9 MG/ML
100 INJECTION, SOLUTION INTRAVENOUS CONTINUOUS
Status: CANCELLED | OUTPATIENT
Start: 2019-02-14

## 2019-01-24 RX ORDER — DIPHENHYDRAMINE HYDROCHLORIDE 50 MG/ML
50 INJECTION, SOLUTION INTRAMUSCULAR; INTRAVENOUS AS NEEDED
Status: CANCELLED
Start: 2019-01-24

## 2019-01-24 RX ORDER — DOXORUBICIN HYDROCHLORIDE 2 MG/ML
25 INJECTION, SOLUTION INTRAVENOUS ONCE
Status: COMPLETED | OUTPATIENT
Start: 2019-01-24 | End: 2019-01-24

## 2019-01-24 RX ORDER — SODIUM CHLORIDE 9 MG/ML
100 INJECTION, SOLUTION INTRAVENOUS CONTINUOUS
Status: CANCELLED | OUTPATIENT
Start: 2019-01-24

## 2019-01-24 RX ORDER — SODIUM CHLORIDE 9 MG/ML
25 INJECTION, SOLUTION INTRAVENOUS CONTINUOUS
Status: CANCELLED | OUTPATIENT
Start: 2019-02-14

## 2019-01-24 RX ORDER — PREDNISONE 20 MG/1
100 TABLET ORAL
Status: DISPENSED | OUTPATIENT
Start: 2019-01-24 | End: 2019-01-25

## 2019-01-24 RX ORDER — SODIUM CHLORIDE 9 MG/ML
100 INJECTION, SOLUTION INTRAVENOUS CONTINUOUS
Status: DISPENSED | OUTPATIENT
Start: 2019-01-24 | End: 2019-01-24

## 2019-01-24 RX ORDER — SODIUM CHLORIDE 9 MG/ML
10 INJECTION INTRAMUSCULAR; INTRAVENOUS; SUBCUTANEOUS AS NEEDED
Status: CANCELLED | OUTPATIENT
Start: 2019-02-14

## 2019-01-24 RX ORDER — HYDROCORTISONE SODIUM SUCCINATE 100 MG/2ML
100 INJECTION, POWDER, FOR SOLUTION INTRAMUSCULAR; INTRAVENOUS AS NEEDED
Status: CANCELLED | OUTPATIENT
Start: 2019-02-14

## 2019-01-24 RX ORDER — ACETAMINOPHEN 325 MG/1
650 TABLET ORAL ONCE
Status: COMPLETED | OUTPATIENT
Start: 2019-01-24 | End: 2019-01-24

## 2019-01-24 RX ORDER — DIPHENHYDRAMINE HYDROCHLORIDE 50 MG/ML
50 INJECTION, SOLUTION INTRAMUSCULAR; INTRAVENOUS ONCE
Status: COMPLETED | OUTPATIENT
Start: 2019-01-24 | End: 2019-01-24

## 2019-01-24 RX ORDER — ALBUTEROL SULFATE 0.83 MG/ML
2.5 SOLUTION RESPIRATORY (INHALATION) AS NEEDED
Status: CANCELLED
Start: 2019-01-24

## 2019-01-24 RX ORDER — SODIUM CHLORIDE 9 MG/ML
10 INJECTION INTRAMUSCULAR; INTRAVENOUS; SUBCUTANEOUS AS NEEDED
Status: ACTIVE | OUTPATIENT
Start: 2019-01-24 | End: 2019-01-24

## 2019-01-24 RX ORDER — DOXORUBICIN HYDROCHLORIDE 2 MG/ML
25 INJECTION, SOLUTION INTRAVENOUS ONCE
Status: CANCELLED
Start: 2019-01-24 | End: 2019-01-24

## 2019-01-24 RX ORDER — EPINEPHRINE 1 MG/ML
0.3 INJECTION, SOLUTION, CONCENTRATE INTRAVENOUS AS NEEDED
Status: CANCELLED | OUTPATIENT
Start: 2019-02-14

## 2019-01-24 RX ORDER — DIPHENHYDRAMINE HYDROCHLORIDE 50 MG/ML
50 INJECTION, SOLUTION INTRAMUSCULAR; INTRAVENOUS ONCE
Status: CANCELLED
Start: 2019-01-24 | End: 2019-01-24

## 2019-01-24 RX ORDER — HEPARIN 100 UNIT/ML
300-500 SYRINGE INTRAVENOUS AS NEEDED
Status: CANCELLED
Start: 2019-01-24

## 2019-01-24 RX ORDER — EPINEPHRINE 1 MG/ML
0.3 INJECTION, SOLUTION, CONCENTRATE INTRAVENOUS AS NEEDED
Status: CANCELLED | OUTPATIENT
Start: 2019-01-24

## 2019-01-24 RX ORDER — OXYCODONE HYDROCHLORIDE 5 MG/1
5 TABLET ORAL
Qty: 80 TAB | Refills: 0 | Status: SHIPPED | OUTPATIENT
Start: 2019-01-24 | End: 2019-02-19 | Stop reason: SDUPTHER

## 2019-01-24 RX ORDER — ALBUTEROL SULFATE 0.83 MG/ML
2.5 SOLUTION RESPIRATORY (INHALATION) AS NEEDED
Status: CANCELLED
Start: 2019-02-14

## 2019-01-24 RX ADMIN — SODIUM CHLORIDE, PRESERVATIVE FREE 500 UNITS: 5 INJECTION INTRAVENOUS at 16:15

## 2019-01-24 RX ADMIN — SODIUM CHLORIDE 500 ML: 900 INJECTION, SOLUTION INTRAVENOUS at 09:47

## 2019-01-24 RX ADMIN — PEGFILGRASTIM 6 MG: KIT SUBCUTANEOUS at 16:11

## 2019-01-24 RX ADMIN — SODIUM CHLORIDE 100 ML/HR: 900 INJECTION, SOLUTION INTRAVENOUS at 12:30

## 2019-01-24 RX ADMIN — PALONOSETRON 0.25 MG: 0.05 INJECTION, SOLUTION INTRAVENOUS at 13:34

## 2019-01-24 RX ADMIN — DOXORUBICIN HYDROCHLORIDE 45.8 MG: 2 INJECTION, SOLUTION INTRAVENOUS at 14:28

## 2019-01-24 RX ADMIN — VINCRISTINE SULFATE 2 MG: 1 INJECTION, SOLUTION INTRAVENOUS at 15:52

## 2019-01-24 RX ADMIN — SODIUM CHLORIDE 150 MG: 900 INJECTION, SOLUTION INTRAVENOUS at 13:34

## 2019-01-24 RX ADMIN — DIPHENHYDRAMINE HYDROCHLORIDE 50 MG: 50 INJECTION INTRAMUSCULAR; INTRAVENOUS at 09:47

## 2019-01-24 RX ADMIN — CYCLOPHOSPHAMIDE 1373 MG: 1 INJECTION, POWDER, FOR SOLUTION INTRAVENOUS; ORAL at 14:43

## 2019-01-24 RX ADMIN — SODIUM CHLORIDE 10 ML: 9 INJECTION INTRAMUSCULAR; INTRAVENOUS; SUBCUTANEOUS at 08:15

## 2019-01-24 RX ADMIN — OBINUTUZUMAB 1000 MG: 1000 INJECTION, SOLUTION, CONCENTRATE INTRAVENOUS at 10:36

## 2019-01-24 RX ADMIN — ACETAMINOPHEN 650 MG: 325 TABLET ORAL at 09:47

## 2019-01-24 NOTE — PATIENT INSTRUCTIONS
Make sure you call the pharmacy for your Prednisone refill. Your next treatment will on 2/14/19. Don't forget to start your Prednisone 100mg that day.

## 2019-01-24 NOTE — PROGRESS NOTES
[id number] [facility] Oncology at [address] [place] [address]  [phone]    Hematology / Oncology Established Visit    Reason for Visit:   Bartolo Brooke is a 41 y.o. male who comes in for f/u of lymphoma. Hematology Oncology Treatment History:     Diagnosis: Follicular lymphoma    Stage: IV    Pathology:   11/13/18 right inguinal LN excision: Follicular lymphoma, high-grade (grade 3a of 3). Comment   The delaney architecture is entirely effaced by atypical lymphocytic proliferation with prominent nodular growth pattern. The majority of the atypical lymphocytes are small to medium in size and have irregular nuclear outlines and inconspicuous nucleoli, morphologically consistent with centrocytes. Scattered large lymphocytes with prominent nucleoli, consistent with centroblasts are identified (> 15 per high-power field). Focal increase in mitotic figures is noted. Occasional follicular dendritic cells are seen. By immunohistochemistry, the atypical lymphocytes are positive for CD20, PAX5, CD10, BCL6 and BCL2 (weak, focal) and negative for MUM1. CD3, CD5 and CD43 stain numerous background T lymphocytes. Ki-67 reveals a high proliferation index in the neoplastic follicles (overall 07-17%). Clinical history indicates 14 cm retroperitoneal mass with bilateral inguinal lymphadenopathy. In summary, the combined morphologic and phenotypic findings are diagnostic of a high-grade follicular lymphoma (grade 3a of 3). There is no evidence of diffuse large B-cell lymphoma. Flow cytometry analysis:   Monoclonal B-cell population (47 % of all cells) with mild increase in side and forward scatter properties expressing CD19, CD20, CD23 and CD10 with surface lambda light chain restriction. No phenotypically aberrant T-cell population. Flow cytometry was performed at [facility]     Prior Treatment: None    Current Treatment: Obinutuzumab-CHOP.  Obinutuzumab: 1000 mg weekly on days 1, 8, 15 for cycle 1, then 1000 mg on day 1 q21 days for cycles 2-6, then monotherapy 1000 mg every 21 days for cycle 7, 8 with Cyclophosphamide 750mg/m2, Doxorubicin 50mg/m2, Vincristine 1.4mg/m2 on day 1 and Prednisone 100mg on Days 1-5, every 21 days for a total of 6 cycles. History of Present Illness:   Mr. Zapata is a 42 y/o male with HTN who comes in for evaluation of Follicular lymphoma. He states he was in his usual state of health in early November 2018, but then he developed low back pain and presented to the ER. The pain was described as constant, radiating to the buttocks, without exacerbating or alleviating factors, associated w/ increased urination, no n/v/d, no dysuria, no fever, he's unsure about weight loss. Work-up at outside hospital revealed a retroperitoneal mass seen on CT imaging, and he was transferred to [facility], [place] for further work-up. CT there showed a large retroperitoneal mass encircling the aorta with invasion of the left renal hilum and left adrenal gland. There were bilateral inguinal lymph nodes and moderate left hydronephrosis. He was evaluated while at [facility] [place] and was noted to have palpable nodes in his groin for approximately the past 1 month. No testicular mass, anorexia, weight loss, fevers, night sweats, chest pain, shortness of breath, abdominal pain or nausea. He underwent excisional LN biopsy of right inguinal LN. He was told that he had likely lymphoma. He was advised to follow up as outpatient for PET scan, bone marrow biopsy. However, patient states he was lost to f/u. He never received any calls or appointment details. His aunt urged patient to seek care elsewhere and his PCP recommended [facility]. At our first meeting on 12/13/18, he tells me that he was working full time, feeling well until early Nov 2018. When he developed the left lower back pain, he went to [facility] [place]. No fevers, chills, night sweats. He has lost 15 lbs in the past month due to low appetite. No n/v/d.  No current constipation. No CP, SOB. No h/o cardiac disease aside from HTN, Hyperlipiemia. No hematochezia/melena, hematuria. He has HTN and XOL, which he was taking meds for, but has run out. Has no PCP currently. He is uninsured. He works as a cook, currently working part time. He has children aged 12 and 13. Interval history: Today, patient comes in for follow up. He denies any recent fevers, chills, sweats, CP, SOB. He has a dry cough occasionally. He still has 2 loose stools a day. Still has the L lower back pain. He has a low appetite, but is eating 2 meals a day. Trying to drink more water now. Still has not seen Cardiology and states he has not been notified of any appointments. FAMILY HISTORY:  His father has a history of leukemia, currently in remission, treated at [facility] [place]. LYMPHATICS:  Bilateral palpable inguinal adenopathy. Past Medical History:   Diagnosis Date    Anxiety     Hyperlipidemia     Hypertension     Lymphadenopathy 11/12/2018      History reviewed. No pertinent surgical history. Social History     Tobacco Use    Smoking status: Current Every Day Smoker     Packs/day: 1.00     Years: 20.00     Pack years: 20.00    Smokeless tobacco: Never Used   Substance Use Topics    Alcohol use: No     Frequency: Never      Family History   Problem Relation Age of Onset    Hypertension Father     Diabetes Mother      Current Outpatient Medications   Medication Sig    hydroCHLOROthiazide (HYDRODIURIL) 12.5 mg tablet Take 1 Tab by mouth daily. DO NOT TAKE for 5 DAYS. Do not take if dehydrated or not eating/drinking. Do not take if blood pressure is low (<397 systolic or <58 diastolic)    lisinopril (PRINIVIL, ZESTRIL) 10 mg tablet Take 1 Tab by mouth daily. DO NOT TAKE for 5 days    L. acidoph & paracasei- S therm- Bifido (AUSTIN-Q/RISAQUAD) 8 billion cell cap cap Take 1 Cap by mouth daily.  magic mouthwash solution Take 15-30 mL by mouth four (4) times daily.  Magic mouth wash Maalox  Lidocaine 2% viscous   Diphenhydramine oral solution    Swish and spit for mouth pain, ok to swallow for throat pain     Pharmacy to mix equal portions of ingredients to a total volume as indicated in the dispense amount.  oxyCODONE IR (ROXICODONE) 5 mg immediate release tablet Take 5 mg by mouth every six (6) hours as needed for Pain.  atorvastatin (LIPITOR) 10 mg tablet Take 10 mg by mouth nightly.  prochlorperazine (COMPAZINE) 10 mg tablet Take 1 Tab by mouth every six (6) hours as needed.  LORazepam (ATIVAN) 0.5 mg tablet Take 1 Tab by mouth every eight (8) hours as needed. Max Daily Amount: 1.5 mg.    senna-docusate (PERICOLACE) 8.6-50 mg per tablet Take 1 Tab by mouth daily.  ondansetron hcl (ZOFRAN) 8 mg tablet Take 1 Tab by mouth every eight (8) hours as needed for Nausea.  predniSONE (DELTASONE) 20 mg tablet Take 100 mg by mouth daily (with breakfast). (on days 1-5 of each chemo cycle)    naloxone (NARCAN) 4 mg/actuation nasal spray Use 1 spray intranasally, then discard. Repeat with new spray every 2 min as needed for opioid overdose symptoms, alternating nostrils.  aspirin delayed-release (ASPIR-81) 81 mg tablet Take 1 Tab by mouth daily. No current facility-administered medications for this visit. No Known Allergies     Review of Systems: A complete review of systems was obtained, negative except as described above. Physical Exam:     Visit Vitals  /85   Pulse (!) 105   Temp 97.7 °F (36.5 °C)   Ht 5' 7\" (1.702 m)   Wt 144 lb 3.2 oz (65.4 kg)   SpO2 97%   BMI 22.58 kg/m²     ECOG PS: 0  General: Well developed, no acute distress  Eyes: PERRLA, EOMI, anicteric sclerae  HENT: Atraumatic, OP clear, poor dentition, multiple dental caries, no erythema,TMs intact without erythema  Neck: Supple  Lymphatic: No supraclavicular, axillary. R cervical 2cm LN noted.  Bilateral small 2-3cm palpable inguinal adenopathy  Respiratory: CTAB, normal respiratory effort  CV: Normal rate, regular rhythm, no murmurs, no peripheral edema  GI: Soft, nontender, nondistended, no masses, no hepatomegaly, no splenomegaly  MS: Normal gait and station. Digits without clubbing or cyanosis. TTP in left lower back  Skin: No rashes, ecchymoses, or petechiae. Normal temperature, turgor, and texture. Neuro/Psych: Alert, oriented. 5/5 strength in all 4 extremities. Appropriate affect, normal judgment/insight. Results:     Lab Results   Component Value Date/Time    WBC 9.9 01/24/2019 08:19 AM    HGB 13.2 01/24/2019 08:19 AM    HCT 39.2 01/24/2019 08:19 AM    PLATELET 864 95/81/5692 08:19 AM    MCV 89.5 01/24/2019 08:19 AM    ABS. NEUTROPHILS 8.0 01/24/2019 08:19 AM    Hemoglobin (POC) 15.0 06/05/2009 02:13 PM    Hematocrit (POC) 44 06/05/2009 02:13 PM     Lab Results   Component Value Date/Time    Sodium 140 01/17/2019 08:20 AM    Potassium 4.2 01/17/2019 08:20 AM    Chloride 105 01/17/2019 08:20 AM    CO2 24 01/17/2019 08:20 AM    Glucose 103 (H) 01/17/2019 08:20 AM    BUN 7 01/17/2019 08:20 AM    Creatinine 0.96 01/17/2019 08:20 AM    GFR est AA >60 01/17/2019 08:20 AM    GFR est non-AA >60 01/17/2019 08:20 AM    Calcium 9.0 01/17/2019 08:20 AM    Sodium (POC) 139 06/05/2009 02:13 PM    Potassium (POC) 3.9 06/05/2009 02:13 PM    Chloride (POC) 103 06/05/2009 02:13 PM    Glucose (POC) 98 06/05/2009 02:13 PM    BUN (POC) 9 06/05/2009 02:13 PM    Creatinine (POC) 1.0 06/05/2009 02:13 PM    Calcium, ionized (POC) 1.21 06/05/2009 02:13 PM     Lab Results   Component Value Date/Time    Bilirubin, total 0.3 01/11/2019 03:27 AM    ALT (SGPT) 30 01/11/2019 03:27 AM    AST (SGOT) 12 (L) 01/11/2019 03:27 AM    Alk.  phosphatase 70 01/11/2019 03:27 AM    Protein, total 5.4 (L) 01/11/2019 03:27 AM    Albumin 2.0 (L) 01/11/2019 03:27 AM    Globulin 3.4 01/11/2019 03:27 AM     No results found for: IRON, FE, TIBC, IBCT, PSAT, FERR    No results found for: B12LT, FOL, RBCF  Lab Results   Component Value Date/Time TSH 1.53 2016 04:40 AM     18:     Lab Results   Component Value Date/Time    Hepatitis A, IgM NONREACTIVE 2018 04:53 PM    Hepatitis B surface Ag <0.10 2018 04:53 PM    Hepatitis B core, IgM NONREACTIVE 2018 04:53 PM         Imagin/9/18 Abd/pelvis CT: IMPRESSION:  1. Interval development of a large retroperitoneal mass encircling the aorta with invasion of the left renal hilum and left adrenal gland. Several adjacent lymph nodes are seen extending into the peritoneum and underneath the  diaphragmatic natalie. This most likely represents lymphoma. 2. Several new bilateral enlarged inguinal lymph nodes also likely representing lymphoma. 3. Moderate left hydronephrosis with a delayed renal nephrogram related to decreased renal function. This is related to the invasion of the renal hilum. 18 Chest CT: IMPRESSION:  Trace left pleural effusion. Bilateral lower lobe atelectasis. Large  retroperitoneal mass lesion again demonstrated. PET 18:  FINDINGS:  HEAD/NECK: Right palatine tonsil intense hypermetabolism, max SUV 18. Multilevel  bilateral cervical adenopathy, with max SUV 12 in a left supraclavicular node. Cerebral evaluation is limited by normal intense activity. CHEST: Solitary hypermetabolic left axillary node, max SUV 11. ABDOMEN/PELVIS: Bulky retroperitoneal mass max SUV 27, with several additional  small active abdomino-pelvic nodes. Bilateral inguinal nodes with max SUV 12 on  the left. SKELETON: No foci of abnormal hypermetabolism in the axial and visualized  appendicular skeleton. IMPRESSION:   1. Right palatine tonsil tumor involvement (Deauville 5). 2. Bilateral cervical delaney involvement (Deauville 5). 3. Left axillary node involvement (Deauville 5). 4. Bulky retroperitoneal lymphoma mass and additional smaller hypermetabolic  abdomino-pelvic nodes (Deauville 5). 5. Bilateral inguinal delaney involvement (Deauville 5).    Deauville Five Point Scale  1. No uptake or no residual uptake (when used interim)  2. Slight uptake, but below blood pool (mediastinum)  3. Uptake above mediastinal, but below/equal to uptake in the liver  4. Uptake slightly to moderately higher than liver  5. Markedly increased uptake or any new lesion (on response evaluation)  Each FDG-avid (or previously FDG avid) lesion is rated independently. Reference values:  Mediastinal blood pool: 2.1 SUV  Liver (background): 2.2 SUV    Assessment & Plan:   Lorena Key is a 41 y.o. male comes in for evaluation and management of lymphoma. 1. Follicular lymphoma: Grade 3a. Although grade 3a disease is considered more indolent and can be treated like grade 1/2 disease, this patient has indications for treatment: bulky disease encircling the aorta causing symptoms. Bone marrow negative for lymphoma, but was hypercellular. BR better than RCHOP, but based on GALLIUM study, Obinutuzumab-based induction and maintenance prolongs PFS over that seen with rituximab-based therapy. Therefore, I have chosen to treat patient with O-CHOP regimen for 6 cycles, followed by possible maintenance Obinutuzumab. We discussed the risks and benefits of O-CHOP chemotherapy. The potential side effects include, but are not limited to: nausea, vomiting, diarrhea, constipation, Flu-like symptoms, infusion reaction, allergic reaction, flushing, taste changes, increased risk of infection, anemia, fatigue, alopecia, neuropathy, nail changes, cardiac damage, mucositis, myleosuppression, infertility and rarely, death. Patient completed chemoteaching and received a chemotherapy education folder. -- Proceed with cycle 2, day 1 of O-CHOP regimen today   -- Neulasta on body today. -- Pt knows to take Prednisone 100mg PO daily on days 1-5 start today, 1/24/19  -- PET scan in 2 weeks to evaluate response to treatment. -- Return in 3 weeks for cycle 3    2.  Use of cardiotoxic chemotherapy: Discussed risks/benefits of using doxorubicin. Echo on 12/17/18 showed EF 65%. -- Still needs Cardiology evaluation     3. Neoplasm related pain / Anxiety: Left lower back pain. Taking Oxycodone 5mg bid prn. Signed pain contract on 12/28/18. Counseled on adding SSRI if anxiety becomes worse. -- Continue Oxycodone 5mg every 6hrs prn for pain, 80 tabs refilled 1/24/19  -- Ativan 0.5mg bid prn, 60 tabs written 1/24/18    4. HTN / Hyperlipidemia: Uncontrolled as pt has been out of his meds for 1 weeks. -- On Lisinopril, HCTZ, Lipitor. 5. Tobacco abuse: Counseled on risk of smoking, benefits of quitting. Discussed cessation strategies. Still smoking a pack per day. 6. Dental infection: Caused neutropenic fever and hospital admission recently. No abscess. Blood cultures negative. Treated with IV antibiotics followed by Augmentin. -- Dental evaluation vs OMFS recommended. Deb Daniels in [address] met with him about affordable dental care options. 7. Diarrhea: 2 loose stools per day. Most likely from antibiotics, advise he drink fluids and take probiotics. Emotional well being: Pt is coping well with his/her disease and has excellent support. Met with SW.    I appreciate the opportunity to participate in Mr. Neela cabezas.     Signed By: Maile Raymond MD     January 24, 2019

## 2019-01-24 NOTE — PROGRESS NOTES
[facility] VISIT NOTE    0805  Pt arrived at [facility] ambulatory and in no distress for O-CHOP D1 C2  Assessment completed, pt w/o complaints. Right chest port accessed with . 75 in joshi with no difficulty. Positive blood return noted and labs drawn. Medications received:  Bendryl IVP  Tylenol PO  Gazyva IV ( titrated per protocol)  Emend IVPB  Aloxi IVP  Doxorubicin IVP  Cytoxan IV  Vincristine IV  Nuelasta on body  Patient Vitals for the past 12 hrs:   Temp Pulse Resp BP   01/24/19 1615 98 °F (36.7 °C) 80 16 103/66   01/24/19 1200 97.3 °F (36.3 °C) 79 16 96/55   01/24/19 1130 98 °F (36.7 °C) 88 16 93/58   01/24/19 1101 98 °F (36.7 °C) 87 16 96/66   01/24/19 0809 97.7 °F (36.5 °C) (!) 105 16 114/85     Recent Results (from the past 12 hour(s))   METABOLIC PANEL, COMPREHENSIVE    Collection Time: 01/24/19  8:19 AM   Result Value Ref Range    Sodium 140 136 - 145 mmol/L    Potassium 3.9 3.5 - 5.1 mmol/L    Chloride 104 97 - 108 mmol/L    CO2 26 21 - 32 mmol/L    Anion gap 10 5 - 15 mmol/L    Glucose 150 (H) 65 - 100 mg/dL    BUN 8 6 - 20 MG/DL    Creatinine 1.17 0.70 - 1.30 MG/DL    BUN/Creatinine ratio 7 (L) 12 - 20      GFR est AA >60 >60 ml/min/1.73m2    GFR est non-AA >60 >60 ml/min/1.73m2    Calcium 9.5 8.5 - 10.1 MG/DL    Bilirubin, total 0.1 (L) 0.2 - 1.0 MG/DL    ALT (SGPT) 26 12 - 78 U/L    AST (SGOT) 19 15 - 37 U/L    Alk.  phosphatase 87 45 - 117 U/L    Protein, total 7.2 6.4 - 8.2 g/dL    Albumin 3.3 (L) 3.5 - 5.0 g/dL    Globulin 3.9 2.0 - 4.0 g/dL    A-G Ratio 0.8 (L) 1.1 - 2.2     CBC WITH AUTOMATED DIFF    Collection Time: 01/24/19  8:19 AM   Result Value Ref Range    WBC 9.9 4.1 - 11.1 K/uL    RBC 4.38 4.10 - 5.70 M/uL    HGB 13.2 12.1 - 17.0 g/dL    HCT 39.2 36.6 - 50.3 %    MCV 89.5 80.0 - 99.0 FL    MCH 30.1 26.0 - 34.0 PG    MCHC 33.7 30.0 - 36.5 g/dL    RDW 14.1 11.5 - 14.5 %    PLATELET 588 053 - 754 K/uL    MPV 11.4 8.9 - 12.9 FL    NRBC 0.0 0  WBC    ABSOLUTE NRBC 0.00 0.00 - 0.01 K/uL    NEUTROPHILS 81 (H) 32 - 75 %    LYMPHOCYTES 13 12 - 49 %    MONOCYTES 4 (L) 5 - 13 %    EOSINOPHILS 0 0 - 7 %    BASOPHILS 1 0 - 1 %    IMMATURE GRANULOCYTES 0 0.0 - 0.5 %    ABS. NEUTROPHILS 8.0 1.8 - 8.0 K/UL    ABS. LYMPHOCYTES 1.3 0.8 - 3.5 K/UL    ABS. MONOCYTES 0.4 0.0 - 1.0 K/UL    ABS. EOSINOPHILS 0.0 0.0 - 0.4 K/UL    ABS. BASOPHILS 0.1 0.0 - 0.1 K/UL    ABS. IMM. GRANS. 0.0 0.00 - 0.04 K/UL    DF AUTOMATED       Education provided to patient about Neulasta On Body Injector including: side effects, how/when to remove the device, as well as what to do in the event of device malfunction. Opportunity for questions was provided and all questions were answered. Patient verbalized understanding. Tolerated treatment well, no adverse reaction noted. Port de-accessed and flushed per protocol. Positive blood return noted. 1615  D/C'd from [facility] ambulatory and in no distress accompanied by father. Next appointment is 2/14/19 at 0800.

## 2019-01-24 NOTE — PROGRESS NOTES
Problem: Chemotherapy Treatment  Goal: *Chemotherapy regimen followed  Outcome: Progressing Towards Goal  Pt receiving O-CHOP D1 C2

## 2019-01-25 LAB — TROPONIN I SERPL-MCNC: 0.09 NG/ML

## 2019-01-28 ENCOUNTER — NURSE NAVIGATOR (OUTPATIENT)
Dept: PALLATIVE CARE | Age: 44
End: 2019-01-28

## 2019-01-28 NOTE — PROGRESS NOTES
[facility]  Nursing Note  [phone]  Fax [phone]      Name:  Yojana Mccoy  YOB: 1975    Received outpatient Palliative Medicine referral from Dr. Bernabe Hernandez to see pt for symptom management and supportive care. Chart  reviewed. Yojana Mccoy is a 41 y.o. male with stage IV follicular lymphoma. Pt's most recent office visit with Dr. Cristina Hewitt was on 1/17/19. See her note for complete HPI, treatment history, and plan. ACP:  None on file                                                                                                                                                         Nurse called pt and introduced Palliative Medicine services. Pt was offered appts on 2/12/19 or 2/14/19 but declined them because they were 8:30am appts.   Appt scheduled for 2/19/19 at 10:30am.  DIMITRI Walker, RN-BC  Clinical Referral Navigator  [phone]

## 2019-02-05 ENCOUNTER — HOSPITAL ENCOUNTER (OUTPATIENT)
Dept: PET IMAGING | Age: 44
Discharge: HOME | End: 2019-02-05
Attending: INTERNAL MEDICINE
Payer: MEDICAID

## 2019-02-05 VITALS — WEIGHT: 145 LBS | HEIGHT: 67 IN | BODY MASS INDEX: 22.76 KG/M2

## 2019-02-05 DIAGNOSIS — C82.33 FOLLICULAR LYMPHOMA GRADE IIIA OF INTRA-ABDOMINAL LYMPH NODES (HCC): ICD-10-CM

## 2019-02-05 PROCEDURE — A9552 F18 FDG: HCPCS

## 2019-02-05 RX ORDER — SODIUM CHLORIDE 0.9 % (FLUSH) 0.9 %
10 SYRINGE (ML) INJECTION
Status: COMPLETED | OUTPATIENT
Start: 2019-02-05 | End: 2019-02-05

## 2019-02-05 RX ADMIN — Medication 10 ML: at 10:14

## 2019-02-14 ENCOUNTER — APPOINTMENT (OUTPATIENT)
Dept: GENERAL RADIOLOGY | Age: 44
End: 2019-02-14
Attending: EMERGENCY MEDICINE
Payer: MEDICAID

## 2019-02-14 ENCOUNTER — HOSPITAL ENCOUNTER (INPATIENT)
Age: 44
LOS: 3 days | Discharge: HOME | DRG: 174 | End: 2019-02-17
Attending: INTERNAL MEDICINE | Admitting: INTERNAL MEDICINE
Payer: MEDICAID

## 2019-02-14 ENCOUNTER — APPOINTMENT (OUTPATIENT)
Dept: CT IMAGING | Age: 44
DRG: 174 | End: 2019-02-14
Attending: INTERNAL MEDICINE
Payer: MEDICAID

## 2019-02-14 ENCOUNTER — APPOINTMENT (OUTPATIENT)
Dept: NON INVASIVE DIAGNOSTICS | Age: 44
DRG: 174 | End: 2019-02-14
Attending: INTERNAL MEDICINE
Payer: MEDICAID

## 2019-02-14 ENCOUNTER — HOSPITAL ENCOUNTER (OUTPATIENT)
Dept: INFUSION THERAPY | Age: 44
Discharge: HOME | End: 2019-02-14
Payer: MEDICAID

## 2019-02-14 ENCOUNTER — OFFICE VISIT (OUTPATIENT)
Dept: ONCOLOGY | Age: 44
End: 2019-02-14

## 2019-02-14 ENCOUNTER — APPOINTMENT (OUTPATIENT)
Dept: CT IMAGING | Age: 44
End: 2019-02-14
Attending: EMERGENCY MEDICINE
Payer: MEDICAID

## 2019-02-14 ENCOUNTER — HOSPITAL ENCOUNTER (EMERGENCY)
Age: 44
Discharge: ANOTHER HOSPITAL | End: 2019-02-14
Attending: EMERGENCY MEDICINE
Payer: MEDICAID

## 2019-02-14 ENCOUNTER — APPOINTMENT (OUTPATIENT)
Dept: GENERAL RADIOLOGY | Age: 44
DRG: 174 | End: 2019-02-14
Attending: INTERNAL MEDICINE
Payer: MEDICAID

## 2019-02-14 VITALS
HEIGHT: 67 IN | SYSTOLIC BLOOD PRESSURE: 145 MMHG | TEMPERATURE: 97.6 F | BODY MASS INDEX: 22.96 KG/M2 | OXYGEN SATURATION: 100 % | HEART RATE: 122 BPM | RESPIRATION RATE: 10 BRPM | WEIGHT: 146.3 LBS | DIASTOLIC BLOOD PRESSURE: 81 MMHG

## 2019-02-14 VITALS
BODY MASS INDEX: 22.96 KG/M2 | DIASTOLIC BLOOD PRESSURE: 65 MMHG | WEIGHT: 146.3 LBS | HEIGHT: 67 IN | SYSTOLIC BLOOD PRESSURE: 105 MMHG | OXYGEN SATURATION: 99 % | TEMPERATURE: 97.6 F | HEART RATE: 99 BPM

## 2019-02-14 VITALS
SYSTOLIC BLOOD PRESSURE: 104 MMHG | HEART RATE: 116 BPM | OXYGEN SATURATION: 100 % | TEMPERATURE: 97.1 F | HEIGHT: 67 IN | BODY MASS INDEX: 22.94 KG/M2 | DIASTOLIC BLOOD PRESSURE: 69 MMHG | RESPIRATION RATE: 16 BRPM | WEIGHT: 146.16 LBS

## 2019-02-14 DIAGNOSIS — C82.90 FOLLICULAR LYMPHOMA, UNSPECIFIED FOLLICULAR LYMPHOMA TYPE, UNSPECIFIED BODY REGION (HCC): Primary | ICD-10-CM

## 2019-02-14 DIAGNOSIS — Z51.11 CHEMOTHERAPY MANAGEMENT, ENCOUNTER FOR: ICD-10-CM

## 2019-02-14 DIAGNOSIS — E78.5 HYPERLIPIDEMIA, UNSPECIFIED HYPERLIPIDEMIA TYPE: ICD-10-CM

## 2019-02-14 DIAGNOSIS — I24.9 ACS (ACUTE CORONARY SYNDROME) (HCC): ICD-10-CM

## 2019-02-14 DIAGNOSIS — C82.59 DIFFUSE FOLLICLE CENTER LYMPHOMA OF SOLID ORGAN EXCLUDING SPLEEN (HCC): ICD-10-CM

## 2019-02-14 DIAGNOSIS — R07.9 CHEST PAIN, UNSPECIFIED TYPE: ICD-10-CM

## 2019-02-14 DIAGNOSIS — R41.82 ALTERED MENTAL STATUS, UNSPECIFIED ALTERED MENTAL STATUS TYPE: ICD-10-CM

## 2019-02-14 DIAGNOSIS — Z79.899 ENCOUNTER FOR MONITORING CARDIOTOXIC DRUG THERAPY: ICD-10-CM

## 2019-02-14 DIAGNOSIS — F41.9 ANXIETY: ICD-10-CM

## 2019-02-14 DIAGNOSIS — I46.9 CARDIAC ARREST (HCC): ICD-10-CM

## 2019-02-14 DIAGNOSIS — I46.9 CARDIAC ARREST (HCC): Primary | ICD-10-CM

## 2019-02-14 DIAGNOSIS — Z51.81 ENCOUNTER FOR MONITORING CARDIOTOXIC DRUG THERAPY: ICD-10-CM

## 2019-02-14 DIAGNOSIS — C82.33 FOLLICULAR LYMPHOMA GRADE IIIA OF INTRA-ABDOMINAL LYMPH NODES (HCC): Primary | ICD-10-CM

## 2019-02-14 DIAGNOSIS — I10 HYPERTENSION, UNSPECIFIED TYPE: ICD-10-CM

## 2019-02-14 DIAGNOSIS — G89.3 ACUTE NEOPLASM-RELATED PAIN: ICD-10-CM

## 2019-02-14 LAB
ALBUMIN SERPL-MCNC: 3.3 G/DL (ref 3.5–5)
ALBUMIN SERPL-MCNC: 3.5 G/DL (ref 3.5–5)
ALBUMIN/GLOB SERPL: 1 {RATIO} (ref 1.1–2.2)
ALBUMIN/GLOB SERPL: 1 {RATIO} (ref 1.1–2.2)
ALP SERPL-CCNC: 100 U/L (ref 45–117)
ALP SERPL-CCNC: 109 U/L (ref 45–117)
ALT SERPL-CCNC: 22 U/L (ref 12–78)
ALT SERPL-CCNC: 38 U/L (ref 12–78)
AMYLASE SERPL-CCNC: 88 U/L (ref 25–115)
ANION GAP SERPL CALC-SCNC: 10 MMOL/L (ref 5–15)
ANION GAP SERPL CALC-SCNC: 11 MMOL/L (ref 5–15)
AST SERPL-CCNC: 13 U/L (ref 15–37)
AST SERPL-CCNC: 28 U/L (ref 15–37)
ATRIAL RATE: 100 BPM
BASOPHILS # BLD: 0 K/UL (ref 0–0.1)
BASOPHILS # BLD: 0.2 K/UL (ref 0–0.1)
BASOPHILS NFR BLD: 0 % (ref 0–1)
BASOPHILS NFR BLD: 1 % (ref 0–1)
BILIRUB SERPL-MCNC: 0.1 MG/DL (ref 0.2–1)
BILIRUB SERPL-MCNC: 0.1 MG/DL (ref 0.2–1)
BUN SERPL-MCNC: 15 MG/DL (ref 6–20)
BUN SERPL-MCNC: 17 MG/DL (ref 6–20)
BUN/CREAT SERPL: 14 (ref 12–20)
BUN/CREAT SERPL: 15 (ref 12–20)
CALCIUM SERPL-MCNC: 9 MG/DL (ref 8.5–10.1)
CALCIUM SERPL-MCNC: 9.5 MG/DL (ref 8.5–10.1)
CALCULATED P AXIS, ECG09: 46 DEGREES
CALCULATED R AXIS, ECG10: 65 DEGREES
CALCULATED T AXIS, ECG11: 169 DEGREES
CHLORIDE SERPL-SCNC: 100 MMOL/L (ref 97–108)
CHLORIDE SERPL-SCNC: 101 MMOL/L (ref 97–108)
CO2 SERPL-SCNC: 22 MMOL/L (ref 21–32)
CO2 SERPL-SCNC: 26 MMOL/L (ref 21–32)
COMMENT, HOLDF: NORMAL
CREAT SERPL-MCNC: 1.1 MG/DL (ref 0.7–1.3)
CREAT SERPL-MCNC: 1.11 MG/DL (ref 0.7–1.3)
DIAGNOSIS, 93000: NORMAL
DIFFERENTIAL METHOD BLD: ABNORMAL
DIFFERENTIAL METHOD BLD: ABNORMAL
ECHO AO ROOT DIAM: 2.86 CM
ECHO LV E' LATERAL VELOCITY: 7.22 CM/S
ECHO LV E' SEPTAL VELOCITY: 6.95 CM/S
END DIASTOLIC PRESSURE: 4
EOSINOPHIL # BLD: 0 K/UL (ref 0–0.4)
EOSINOPHIL # BLD: 0.1 K/UL (ref 0–0.4)
EOSINOPHIL NFR BLD: 0 % (ref 0–7)
EOSINOPHIL NFR BLD: 1 % (ref 0–7)
ERYTHROCYTE [DISTWIDTH] IN BLOOD BY AUTOMATED COUNT: 15.5 % (ref 11.5–14.5)
ERYTHROCYTE [DISTWIDTH] IN BLOOD BY AUTOMATED COUNT: 15.9 % (ref 11.5–14.5)
GLOBULIN SER CALC-MCNC: 3.3 G/DL (ref 2–4)
GLOBULIN SER CALC-MCNC: 3.6 G/DL (ref 2–4)
GLUCOSE SERPL-MCNC: 119 MG/DL (ref 65–100)
GLUCOSE SERPL-MCNC: 128 MG/DL (ref 65–100)
HCT VFR BLD AUTO: 36.5 % (ref 36.6–50.3)
HCT VFR BLD AUTO: 38.6 % (ref 36.6–50.3)
HGB BLD-MCNC: 12.3 G/DL (ref 12.1–17)
HGB BLD-MCNC: 13.2 G/DL (ref 12.1–17)
IMM GRANULOCYTES # BLD AUTO: 0.1 K/UL (ref 0–0.04)
IMM GRANULOCYTES # BLD AUTO: 0.3 K/UL (ref 0–0.04)
IMM GRANULOCYTES NFR BLD AUTO: 1 % (ref 0–0.5)
IMM GRANULOCYTES NFR BLD AUTO: 1 % (ref 0–0.5)
LACTATE SERPL-SCNC: 0.9 MMOL/L (ref 0.4–2)
LIPASE SERPL-CCNC: 78 U/L (ref 73–393)
LYMPHOCYTES # BLD: 0.6 K/UL (ref 0.8–3.5)
LYMPHOCYTES # BLD: 1.5 K/UL (ref 0.8–3.5)
LYMPHOCYTES NFR BLD: 13 % (ref 12–49)
LYMPHOCYTES NFR BLD: 2 % (ref 12–49)
MCH RBC QN AUTO: 29.9 PG (ref 26–34)
MCH RBC QN AUTO: 30.4 PG (ref 26–34)
MCHC RBC AUTO-ENTMCNC: 33.7 G/DL (ref 30–36.5)
MCHC RBC AUTO-ENTMCNC: 34.2 G/DL (ref 30–36.5)
MCV RBC AUTO: 88.8 FL (ref 80–99)
MCV RBC AUTO: 88.9 FL (ref 80–99)
MONOCYTES # BLD: 0.9 K/UL (ref 0–1)
MONOCYTES # BLD: 1.2 K/UL (ref 0–1)
MONOCYTES NFR BLD: 10 % (ref 5–13)
MONOCYTES NFR BLD: 3 % (ref 5–13)
NEUTS SEG # BLD: 29.4 K/UL (ref 1.8–8)
NEUTS SEG # BLD: 9 K/UL (ref 1.8–8)
NEUTS SEG NFR BLD: 75 % (ref 32–75)
NEUTS SEG NFR BLD: 94 % (ref 32–75)
NRBC # BLD: 0 K/UL (ref 0–0.01)
NRBC # BLD: 0 K/UL (ref 0–0.01)
NRBC BLD-RTO: 0 PER 100 WBC
NRBC BLD-RTO: 0 PER 100 WBC
P-R INTERVAL, ECG05: 122 MS
PLATELET # BLD AUTO: 252 K/UL (ref 150–400)
PLATELET # BLD AUTO: 266 K/UL (ref 150–400)
PMV BLD AUTO: 10.4 FL (ref 8.9–12.9)
PMV BLD AUTO: 10.9 FL (ref 8.9–12.9)
POTASSIUM SERPL-SCNC: 3.6 MMOL/L (ref 3.5–5.1)
POTASSIUM SERPL-SCNC: 4 MMOL/L (ref 3.5–5.1)
PROT SERPL-MCNC: 6.6 G/DL (ref 6.4–8.2)
PROT SERPL-MCNC: 7.1 G/DL (ref 6.4–8.2)
Q-T INTERVAL, ECG07: 374 MS
QRS DURATION, ECG06: 102 MS
QTC CALCULATION (BEZET), ECG08: 482 MS
RBC # BLD AUTO: 4.11 M/UL (ref 4.1–5.7)
RBC # BLD AUTO: 4.34 M/UL (ref 4.1–5.7)
RBC MORPH BLD: ABNORMAL
SAMPLES BEING HELD,HOLD: NORMAL
SODIUM SERPL-SCNC: 134 MMOL/L (ref 136–145)
SODIUM SERPL-SCNC: 136 MMOL/L (ref 136–145)
TROPONIN I SERPL-MCNC: 0.06 NG/ML
VENTRICULAR RATE, ECG03: 100 BPM
WBC # BLD AUTO: 12 K/UL (ref 4.1–11.1)
WBC # BLD AUTO: 31.2 K/UL (ref 4.1–11.1)

## 2019-02-14 PROCEDURE — 74011250637 HC RX REV CODE- 250/637: Performed by: INTERNAL MEDICINE

## 2019-02-14 PROCEDURE — 99285 EMERGENCY DEPT VISIT HI MDM: CPT

## 2019-02-14 PROCEDURE — 85025 COMPLETE CBC W/AUTO DIFF WBC: CPT

## 2019-02-14 PROCEDURE — 74011000250 HC RX REV CODE- 250: Performed by: INTERNAL MEDICINE

## 2019-02-14 PROCEDURE — 93458 L HRT ARTERY/VENTRICLE ANGIO: CPT | Performed by: INTERNAL MEDICINE

## 2019-02-14 PROCEDURE — C1725 CATH, TRANSLUMIN NON-LASER: HCPCS | Performed by: INTERNAL MEDICINE

## 2019-02-14 PROCEDURE — B2111ZZ FLUOROSCOPY OF MULTIPLE CORONARY ARTERIES USING LOW OSMOLAR CONTRAST: ICD-10-PCS | Performed by: INTERNAL MEDICINE

## 2019-02-14 PROCEDURE — 36415 COLL VENOUS BLD VENIPUNCTURE: CPT

## 2019-02-14 PROCEDURE — 74177 CT ABD & PELVIS W/CONTRAST: CPT

## 2019-02-14 PROCEDURE — 87040 BLOOD CULTURE FOR BACTERIA: CPT

## 2019-02-14 PROCEDURE — 82150 ASSAY OF AMYLASE: CPT

## 2019-02-14 PROCEDURE — 74011636320 HC RX REV CODE- 636/320

## 2019-02-14 PROCEDURE — 74011250636 HC RX REV CODE- 250/636: Performed by: EMERGENCY MEDICINE

## 2019-02-14 PROCEDURE — 84484 ASSAY OF TROPONIN QUANT: CPT

## 2019-02-14 PROCEDURE — 83605 ASSAY OF LACTIC ACID: CPT

## 2019-02-14 PROCEDURE — 74011250636 HC RX REV CODE- 250/636: Performed by: INTERNAL MEDICINE

## 2019-02-14 PROCEDURE — 77030008683 HC TU ET CUF COVD -A

## 2019-02-14 PROCEDURE — 83690 ASSAY OF LIPASE: CPT

## 2019-02-14 PROCEDURE — C1769 GUIDE WIRE: HCPCS | Performed by: INTERNAL MEDICINE

## 2019-02-14 PROCEDURE — 93005 ELECTROCARDIOGRAM TRACING: CPT

## 2019-02-14 PROCEDURE — 74011000258 HC RX REV CODE- 258: Performed by: RADIOLOGY

## 2019-02-14 PROCEDURE — 74011000250 HC RX REV CODE- 250: Performed by: EMERGENCY MEDICINE

## 2019-02-14 PROCEDURE — C1887 CATHETER, GUIDING: HCPCS | Performed by: INTERNAL MEDICINE

## 2019-02-14 PROCEDURE — 70450 CT HEAD/BRAIN W/O DYE: CPT

## 2019-02-14 PROCEDURE — 77030034848

## 2019-02-14 PROCEDURE — 96374 THER/PROPH/DIAG INJ IV PUSH: CPT

## 2019-02-14 PROCEDURE — 5A1935Z RESPIRATORY VENTILATION, LESS THAN 24 CONSECUTIVE HOURS: ICD-10-PCS | Performed by: INTERNAL MEDICINE

## 2019-02-14 PROCEDURE — 51702 INSERT TEMP BLADDER CATH: CPT

## 2019-02-14 PROCEDURE — 99153 MOD SED SAME PHYS/QHP EA: CPT | Performed by: INTERNAL MEDICINE

## 2019-02-14 PROCEDURE — 71275 CT ANGIOGRAPHY CHEST: CPT

## 2019-02-14 PROCEDURE — 95816 EEG AWAKE AND DROWSY: CPT | Performed by: INTERNAL MEDICINE

## 2019-02-14 PROCEDURE — 92928 PRQ TCAT PLMT NTRAC ST 1 LES: CPT | Performed by: INTERNAL MEDICINE

## 2019-02-14 PROCEDURE — 80053 COMPREHEN METABOLIC PANEL: CPT

## 2019-02-14 PROCEDURE — C1874 STENT, COATED/COV W/DEL SYS: HCPCS | Performed by: INTERNAL MEDICINE

## 2019-02-14 PROCEDURE — 94002 VENT MGMT INPAT INIT DAY: CPT

## 2019-02-14 PROCEDURE — 77030012965 HC NDL HUBR BBMI -A

## 2019-02-14 PROCEDURE — 77030005534 HC CATH URETH FOL TEMP SENS SIMS -B

## 2019-02-14 PROCEDURE — 74018 RADEX ABDOMEN 1 VIEW: CPT

## 2019-02-14 PROCEDURE — C1751 CATH, INF, PER/CENT/MIDLINE: HCPCS

## 2019-02-14 PROCEDURE — 77030004533 HC CATH ANGI DX IMP BSC -B: Performed by: INTERNAL MEDICINE

## 2019-02-14 PROCEDURE — 74011000258 HC RX REV CODE- 258: Performed by: INTERNAL MEDICINE

## 2019-02-14 PROCEDURE — 027034Z DILATION OF CORONARY ARTERY, ONE ARTERY WITH DRUG-ELUTING INTRALUMINAL DEVICE, PERCUTANEOUS APPROACH: ICD-10-PCS | Performed by: INTERNAL MEDICINE

## 2019-02-14 PROCEDURE — 74011636320 HC RX REV CODE- 636/320: Performed by: INTERNAL MEDICINE

## 2019-02-14 PROCEDURE — 96375 TX/PRO/DX INJ NEW DRUG ADDON: CPT

## 2019-02-14 PROCEDURE — 96361 HYDRATE IV INFUSION ADD-ON: CPT

## 2019-02-14 PROCEDURE — 4A023N7 MEASUREMENT OF CARDIAC SAMPLING AND PRESSURE, LEFT HEART, PERCUTANEOUS APPROACH: ICD-10-PCS | Performed by: INTERNAL MEDICINE

## 2019-02-14 PROCEDURE — 71045 X-RAY EXAM CHEST 1 VIEW: CPT

## 2019-02-14 PROCEDURE — 75810000137 HC PLCMT CENT VENOUS CATH

## 2019-02-14 PROCEDURE — 99152 MOD SED SAME PHYS/QHP 5/>YRS: CPT | Performed by: INTERNAL MEDICINE

## 2019-02-14 PROCEDURE — 65620000000 HC RM CCU GENERAL

## 2019-02-14 PROCEDURE — 77030029065 HC DRSG HEMO QCLOT ZMED -B

## 2019-02-14 PROCEDURE — 77030013744: Performed by: INTERNAL MEDICINE

## 2019-02-14 PROCEDURE — 96366 THER/PROPH/DIAG IV INF ADDON: CPT

## 2019-02-14 PROCEDURE — 74011250636 HC RX REV CODE- 250/636

## 2019-02-14 PROCEDURE — 96365 THER/PROPH/DIAG IV INF INIT: CPT

## 2019-02-14 PROCEDURE — 74011636320 HC RX REV CODE- 636/320: Performed by: RADIOLOGY

## 2019-02-14 PROCEDURE — 96376 TX/PRO/DX INJ SAME DRUG ADON: CPT

## 2019-02-14 PROCEDURE — 80047 BASIC METABLC PNL IONIZED CA: CPT

## 2019-02-14 PROCEDURE — 77030008477 HC STYL SATN SLP COVD -A

## 2019-02-14 PROCEDURE — 93306 TTE W/DOPPLER COMPLETE: CPT

## 2019-02-14 PROCEDURE — 31500 INSERT EMERGENCY AIRWAY: CPT

## 2019-02-14 PROCEDURE — 74011000250 HC RX REV CODE- 250

## 2019-02-14 DEVICE — STENT RONYX22526W RESOLUTE ONYX 2.25X26
Type: IMPLANTABLE DEVICE | Status: FUNCTIONAL
Brand: RESOLUTE ONYX™

## 2019-02-14 RX ORDER — SUCCINYLCHOLINE CHLORIDE 20 MG/ML
100 INJECTION INTRAMUSCULAR; INTRAVENOUS
Status: COMPLETED | OUTPATIENT
Start: 2019-02-14 | End: 2019-02-14

## 2019-02-14 RX ORDER — PROPOFOL 10 MG/ML
0-50 VIAL (ML) INTRAVENOUS
Status: DISCONTINUED | OUTPATIENT
Start: 2019-02-14 | End: 2019-02-15

## 2019-02-14 RX ORDER — HEPARIN SODIUM 200 [USP'U]/100ML
INJECTION, SOLUTION INTRAVENOUS
Status: COMPLETED | OUTPATIENT
Start: 2019-02-14 | End: 2019-02-14

## 2019-02-14 RX ORDER — ACETAMINOPHEN 325 MG/1
650 TABLET ORAL ONCE
Status: COMPLETED | OUTPATIENT
Start: 2019-02-14 | End: 2019-02-14

## 2019-02-14 RX ORDER — MIDAZOLAM HYDROCHLORIDE 1 MG/ML
2 INJECTION, SOLUTION INTRAMUSCULAR; INTRAVENOUS
Status: COMPLETED | OUTPATIENT
Start: 2019-02-14 | End: 2019-02-14

## 2019-02-14 RX ORDER — MIDAZOLAM HYDROCHLORIDE 1 MG/ML
2 INJECTION, SOLUTION INTRAMUSCULAR; INTRAVENOUS ONCE
Status: COMPLETED | OUTPATIENT
Start: 2019-02-14 | End: 2019-02-14

## 2019-02-14 RX ORDER — BACITRACIN 500 UNIT/G
PACKET (EA) TOPICAL
Status: COMPLETED
Start: 2019-02-14 | End: 2019-02-14

## 2019-02-14 RX ORDER — IPRATROPIUM BROMIDE AND ALBUTEROL SULFATE 2.5; .5 MG/3ML; MG/3ML
3 SOLUTION RESPIRATORY (INHALATION)
Status: DISCONTINUED | OUTPATIENT
Start: 2019-02-14 | End: 2019-02-16

## 2019-02-14 RX ORDER — VANCOMYCIN/0.9 % SOD CHLORIDE 1.5G/250ML
1500 PLASTIC BAG, INJECTION (ML) INTRAVENOUS ONCE
Status: COMPLETED | OUTPATIENT
Start: 2019-02-14 | End: 2019-02-14

## 2019-02-14 RX ORDER — SODIUM CHLORIDE 9 MG/ML
25-100 INJECTION, SOLUTION INTRAVENOUS CONTINUOUS
Status: DISCONTINUED | OUTPATIENT
Start: 2019-02-14 | End: 2019-02-14 | Stop reason: HOSPADM

## 2019-02-14 RX ORDER — ONDANSETRON 2 MG/ML
4 INJECTION INTRAMUSCULAR; INTRAVENOUS
Status: DISCONTINUED | OUTPATIENT
Start: 2019-02-14 | End: 2019-02-17 | Stop reason: HOSPADM

## 2019-02-14 RX ORDER — SODIUM CHLORIDE 0.9 % (FLUSH) 0.9 %
5-40 SYRINGE (ML) INJECTION EVERY 8 HOURS
Status: DISCONTINUED | OUTPATIENT
Start: 2019-02-14 | End: 2019-02-17 | Stop reason: HOSPADM

## 2019-02-14 RX ORDER — CLOPIDOGREL BISULFATE 75 MG/1
300 TABLET ORAL ONCE
Status: COMPLETED | OUTPATIENT
Start: 2019-02-14 | End: 2019-02-14

## 2019-02-14 RX ORDER — PALONOSETRON 0.05 MG/ML
0.25 INJECTION, SOLUTION INTRAVENOUS ONCE
Status: ACTIVE | OUTPATIENT
Start: 2019-02-14 | End: 2019-02-14

## 2019-02-14 RX ORDER — FENTANYL CITRATE 50 UG/ML
INJECTION, SOLUTION INTRAMUSCULAR; INTRAVENOUS AS NEEDED
Status: DISCONTINUED | OUTPATIENT
Start: 2019-02-14 | End: 2019-02-14 | Stop reason: HOSPADM

## 2019-02-14 RX ORDER — PREDNISONE 20 MG/1
100 TABLET ORAL
Status: DISPENSED | OUTPATIENT
Start: 2019-02-14 | End: 2019-02-15

## 2019-02-14 RX ORDER — SODIUM CHLORIDE 0.9 % (FLUSH) 0.9 %
5-40 SYRINGE (ML) INJECTION AS NEEDED
Status: DISCONTINUED | OUTPATIENT
Start: 2019-02-14 | End: 2019-02-17 | Stop reason: HOSPADM

## 2019-02-14 RX ORDER — CHLORHEXIDINE GLUCONATE 1.2 MG/ML
15 RINSE ORAL EVERY 12 HOURS
Status: DISCONTINUED | OUTPATIENT
Start: 2019-02-14 | End: 2019-02-17 | Stop reason: HOSPADM

## 2019-02-14 RX ORDER — SODIUM CHLORIDE 9 MG/ML
150 INJECTION, SOLUTION INTRAVENOUS CONTINUOUS
Status: DISPENSED | OUTPATIENT
Start: 2019-02-14 | End: 2019-02-15

## 2019-02-14 RX ORDER — DOXORUBICIN HYDROCHLORIDE 2 MG/ML
25 INJECTION, SOLUTION INTRAVENOUS ONCE
Status: DISPENSED | OUTPATIENT
Start: 2019-02-14 | End: 2019-02-14

## 2019-02-14 RX ORDER — MIDAZOLAM HYDROCHLORIDE 1 MG/ML
INJECTION, SOLUTION INTRAMUSCULAR; INTRAVENOUS AS NEEDED
Status: DISCONTINUED | OUTPATIENT
Start: 2019-02-14 | End: 2019-02-14 | Stop reason: HOSPADM

## 2019-02-14 RX ORDER — MIDAZOLAM HYDROCHLORIDE 1 MG/ML
INJECTION, SOLUTION INTRAMUSCULAR; INTRAVENOUS
Status: COMPLETED
Start: 2019-02-14 | End: 2019-02-14

## 2019-02-14 RX ORDER — HEPARIN 100 UNIT/ML
300-500 SYRINGE INTRAVENOUS AS NEEDED
Status: ACTIVE | OUTPATIENT
Start: 2019-02-14 | End: 2019-02-14

## 2019-02-14 RX ORDER — LIDOCAINE HYDROCHLORIDE 10 MG/ML
INJECTION INFILTRATION; PERINEURAL AS NEEDED
Status: DISCONTINUED | OUTPATIENT
Start: 2019-02-14 | End: 2019-02-14 | Stop reason: HOSPADM

## 2019-02-14 RX ORDER — IBUPROFEN 200 MG
1 TABLET ORAL DAILY
Status: DISCONTINUED | OUTPATIENT
Start: 2019-02-15 | End: 2019-02-17 | Stop reason: HOSPADM

## 2019-02-14 RX ORDER — ONDANSETRON 2 MG/ML
INJECTION INTRAMUSCULAR; INTRAVENOUS
Status: COMPLETED
Start: 2019-02-14 | End: 2019-02-14

## 2019-02-14 RX ORDER — SODIUM CHLORIDE 9 MG/ML
25 INJECTION, SOLUTION INTRAVENOUS CONTINUOUS
Status: DISPENSED | OUTPATIENT
Start: 2019-02-14 | End: 2019-02-14

## 2019-02-14 RX ORDER — SODIUM CHLORIDE 9 MG/ML
10 INJECTION INTRAMUSCULAR; INTRAVENOUS; SUBCUTANEOUS AS NEEDED
Status: ACTIVE | OUTPATIENT
Start: 2019-02-14 | End: 2019-02-14

## 2019-02-14 RX ORDER — ETOMIDATE 2 MG/ML
30 INJECTION INTRAVENOUS
Status: COMPLETED | OUTPATIENT
Start: 2019-02-14 | End: 2019-02-14

## 2019-02-14 RX ORDER — CLOPIDOGREL BISULFATE 75 MG/1
75 TABLET ORAL DAILY
Status: DISCONTINUED | OUTPATIENT
Start: 2019-02-15 | End: 2019-02-17 | Stop reason: HOSPADM

## 2019-02-14 RX ORDER — ASPIRIN 300 MG/1
300 SUPPOSITORY RECTAL DAILY
Status: DISCONTINUED | OUTPATIENT
Start: 2019-02-15 | End: 2019-02-16

## 2019-02-14 RX ORDER — SODIUM CHLORIDE 0.9 % (FLUSH) 0.9 %
10 SYRINGE (ML) INJECTION
Status: COMPLETED | OUTPATIENT
Start: 2019-02-14 | End: 2019-02-14

## 2019-02-14 RX ORDER — PREDNISONE 20 MG/1
100 TABLET ORAL DAILY
Refills: 0 | Status: SHIPPED | COMMUNITY
Start: 2019-02-14 | End: 2019-02-22 | Stop reason: ALTCHOICE

## 2019-02-14 RX ORDER — PROPOFOL 10 MG/ML
0-50 VIAL (ML) INTRAVENOUS
Status: DISCONTINUED | OUTPATIENT
Start: 2019-02-14 | End: 2019-02-14

## 2019-02-14 RX ORDER — SODIUM CHLORIDE 9 MG/ML
100 INJECTION, SOLUTION INTRAVENOUS CONTINUOUS
Status: DISPENSED | OUTPATIENT
Start: 2019-02-14 | End: 2019-02-14

## 2019-02-14 RX ORDER — DIPHENHYDRAMINE HYDROCHLORIDE 50 MG/ML
50 INJECTION, SOLUTION INTRAMUSCULAR; INTRAVENOUS ONCE
Status: COMPLETED | OUTPATIENT
Start: 2019-02-14 | End: 2019-02-14

## 2019-02-14 RX ORDER — PROPOFOL 10 MG/ML
0-50 VIAL (ML) INTRAVENOUS
Status: DISCONTINUED | OUTPATIENT
Start: 2019-02-14 | End: 2019-02-14 | Stop reason: HOSPADM

## 2019-02-14 RX ORDER — ONDANSETRON 2 MG/ML
8 INJECTION INTRAMUSCULAR; INTRAVENOUS
Status: COMPLETED | OUTPATIENT
Start: 2019-02-14 | End: 2019-02-14

## 2019-02-14 RX ADMIN — DIPHENHYDRAMINE HYDROCHLORIDE 50 MG: 50 INJECTION INTRAMUSCULAR; INTRAVENOUS at 10:09

## 2019-02-14 RX ADMIN — Medication 50 MCG/HR: at 15:57

## 2019-02-14 RX ADMIN — IOPAMIDOL 85 ML: 755 INJECTION, SOLUTION INTRAVENOUS at 12:25

## 2019-02-14 RX ADMIN — BACITRACIN 1 PACKET: 500 OINTMENT TOPICAL at 21:29

## 2019-02-14 RX ADMIN — PROPOFOL 50 MCG/KG/MIN: 10 INJECTION, EMULSION INTRAVENOUS at 15:00

## 2019-02-14 RX ADMIN — ONDANSETRON 8 MG: 2 INJECTION INTRAMUSCULAR; INTRAVENOUS at 11:30

## 2019-02-14 RX ADMIN — MIDAZOLAM HYDROCHLORIDE 2 MG: 1 INJECTION, SOLUTION INTRAMUSCULAR; INTRAVENOUS at 13:10

## 2019-02-14 RX ADMIN — SODIUM CHLORIDE 150 ML/HR: 900 INJECTION, SOLUTION INTRAVENOUS at 18:16

## 2019-02-14 RX ADMIN — SODIUM CHLORIDE 150 ML/HR: 900 INJECTION, SOLUTION INTRAVENOUS at 22:23

## 2019-02-14 RX ADMIN — PHENYLEPHRINE HYDROCHLORIDE 50 MCG/MIN: 10 INJECTION INTRAVENOUS at 19:48

## 2019-02-14 RX ADMIN — ETOMIDATE 30 MG: 2 INJECTION, SOLUTION INTRAVENOUS at 11:31

## 2019-02-14 RX ADMIN — ACETAMINOPHEN 650 MG: 325 TABLET ORAL at 10:09

## 2019-02-14 RX ADMIN — SODIUM CHLORIDE 100 ML/HR: 900 INJECTION, SOLUTION INTRAVENOUS at 17:15

## 2019-02-14 RX ADMIN — MIDAZOLAM HYDROCHLORIDE 2 MG: 1 INJECTION, SOLUTION INTRAMUSCULAR; INTRAVENOUS at 11:45

## 2019-02-14 RX ADMIN — PIPERACILLIN SODIUM,TAZOBACTAM SODIUM 3.38 G: 3; .375 INJECTION, POWDER, FOR SOLUTION INTRAVENOUS at 20:51

## 2019-02-14 RX ADMIN — Medication 10 ML: at 20:52

## 2019-02-14 RX ADMIN — MIDAZOLAM 2 MG: 1 INJECTION INTRAMUSCULAR; INTRAVENOUS at 12:00

## 2019-02-14 RX ADMIN — FAMOTIDINE 20 MG: 10 INJECTION, SOLUTION INTRAVENOUS at 20:46

## 2019-02-14 RX ADMIN — Medication 10 ML: at 22:17

## 2019-02-14 RX ADMIN — SODIUM CHLORIDE 999 ML/HR: 900 INJECTION, SOLUTION INTRAVENOUS at 16:50

## 2019-02-14 RX ADMIN — SUCCINYLCHOLINE CHLORIDE 100 MG: 20 INJECTION, SOLUTION INTRAMUSCULAR; INTRAVENOUS; PARENTERAL at 11:32

## 2019-02-14 RX ADMIN — SODIUM CHLORIDE 100 ML: 900 INJECTION, SOLUTION INTRAVENOUS at 23:36

## 2019-02-14 RX ADMIN — PROPOFOL 25 MCG/KG/MIN: 10 INJECTION, EMULSION INTRAVENOUS at 11:45

## 2019-02-14 RX ADMIN — CHLORHEXIDINE GLUCONATE 15 ML: 1.2 RINSE ORAL at 20:45

## 2019-02-14 RX ADMIN — IOPAMIDOL 100 ML: 755 INJECTION, SOLUTION INTRAVENOUS at 23:36

## 2019-02-14 RX ADMIN — Medication 10 ML: at 22:18

## 2019-02-14 RX ADMIN — CLOPIDOGREL BISULFATE 300 MG: 300 TABLET, FILM COATED ORAL at 18:54

## 2019-02-14 RX ADMIN — VANCOMYCIN HYDROCHLORIDE 1500 MG: 10 INJECTION, POWDER, LYOPHILIZED, FOR SOLUTION INTRAVENOUS at 20:52

## 2019-02-14 RX ADMIN — SODIUM CHLORIDE 500 ML: 900 INJECTION, SOLUTION INTRAVENOUS at 09:55

## 2019-02-14 RX ADMIN — IOHEXOL 50 ML: 240 INJECTION, SOLUTION INTRATHECAL; INTRAVASCULAR; INTRAVENOUS; ORAL at 23:36

## 2019-02-14 NOTE — Clinical Note
Lesion: Located in the Proximal LAD. Stent inserted. Stent deployed. Multiple inflations used. First inflation pressure = 12 cuba; inflation time: 20 sec. Second inflation pressure: 12 cuba; inflation time: 11 sec.

## 2019-02-14 NOTE — Clinical Note
TRANSFER - IN REPORT:  
 
Verbal report received from: [facility] CCU RN. Report consisted of patient's Situation, Background, Assessment and  
Recommendations(SBAR). Opportunity for questions and clarification was provided. Assessment completed upon patient's arrival to unit and care assumed. Patient transported with a Registered Nurse, [address] [place] [id number] / Patient Care Tech, Monitor and Oxygen.

## 2019-02-14 NOTE — ED NOTES
TRANSFER - OUT REPORT:    Verbal report given to Amador Gamboa RN(name) on Lorena Key  being transferred to Critical Transport(unit) for urgent transfer       Report consisted of patients Situation, Background, Assessment and   Recommendations(SBAR). Information from the following report(s) Kardex and ED Summary was reviewed with the receiving nurse. Lines:   Venous Access Device Bard PowerPort 12/20/18 Upper chest (subclavicular area, right (Active)   Central Line Being Utilized Yes 2/14/2019  8:20 AM   Criteria for Appropriate Use Irritant/vesicant medication 2/14/2019  8:20 AM   Site Assessment Clean, dry, & intact 2/14/2019  8:20 AM   Date of Last Dressing Change 02/14/19 2/14/2019  8:20 AM   Dressing Status New 2/14/2019  8:20 AM   Dressing Type Transparent 2/14/2019  8:20 AM   Date Accessed (Medial Site) 02/14/19 2/14/2019  8:20 AM   Access Time (Medial Site) 0820 2/14/2019  8:20 AM   Access Needle Size (Site #1) 20 G 2/14/2019  8:20 AM   Access Needle Length (Medial Site) 0.75 inches 2/14/2019  8:20 AM   Positive Blood Return (Medial Site) Yes 2/14/2019  8:20 AM   Action Taken (Medial Site) Blood drawn;Flushed 2/14/2019  8:20 AM        Opportunity for questions and clarification was provided.       Patient transported with:   Monitor  O2 @ vent liters  Registered Nurse

## 2019-02-14 NOTE — PROGRESS NOTES
[address] [facility] Oncology at [address] [place] [address]  [phone]    Hematology / Oncology Established Visit    Reason for Visit:   Evelin Schmitz is a 41 y.o. male who comes in for f/u of lymphoma. Hematology Oncology Treatment History:     Diagnosis: Follicular lymphoma    Stage: IV    Pathology:   11/13/18 right inguinal LN excision: Follicular lymphoma, high-grade (grade 3a of 3). Comment   The delaney architecture is entirely effaced by atypical lymphocytic proliferation with prominent nodular growth pattern. The majority of the atypical lymphocytes are small to medium in size and have irregular nuclear outlines and inconspicuous nucleoli, morphologically consistent with centrocytes. Scattered large lymphocytes with prominent nucleoli, consistent with centroblasts are identified (> 15 per high-power field). Focal increase in mitotic figures is noted. Occasional follicular dendritic cells are seen. By immunohistochemistry, the atypical lymphocytes are positive for CD20, PAX5, CD10, BCL6 and BCL2 (weak, focal) and negative for MUM1. CD3, CD5 and CD43 stain numerous background T lymphocytes. Ki-67 reveals a high proliferation index in the neoplastic follicles (overall 93-89%). Clinical history indicates 14 cm retroperitoneal mass with bilateral inguinal lymphadenopathy. In summary, the combined morphologic and phenotypic findings are diagnostic of a high-grade follicular lymphoma (grade 3a of 3). There is no evidence of diffuse large B-cell lymphoma. Flow cytometry analysis:   Monoclonal B-cell population (47 % of all cells) with mild increase in side and forward scatter properties expressing CD19, CD20, CD23 and CD10 with surface lambda light chain restriction. No phenotypically aberrant T-cell population. Flow cytometry was performed at [facility]     Prior Treatment: None    Current Treatment: Obinutuzumab-CHOP.  Obinutuzumab: 1000 mg weekly on days 1, 8, 15 for cycle 1, then 1000 mg on day 1 q21 days for cycles 2-6, then monotherapy 1000 mg every 21 days for cycle 7, 8 with Cyclophosphamide 750mg/m2, Doxorubicin 50mg/m2, Vincristine 1.4mg/m2 on day 1 and Prednisone 100mg on Days 1-5, every 21 days for a total of 6 cycles. History of Present Illness:   Mr. Zapata is a 40 y/o male with HTN who comes in for evaluation of Follicular lymphoma. He states he was in his usual state of health in early November 2018, but then he developed low back pain and presented to the ER. The pain was described as constant, radiating to the buttocks, without exacerbating or alleviating factors, associated w/ increased urination, no n/v/d, no dysuria, no fever, he's unsure about weight loss. Work-up at outside hospital revealed a retroperitoneal mass seen on CT imaging, and he was transferred to [facility] for further work-up. CT there showed a large retroperitoneal mass encircling the aorta with invasion of the left renal hilum and left adrenal gland. There were bilateral inguinal lymph nodes and moderate left hydronephrosis. He was evaluated while at [facility] and was noted to have palpable nodes in his groin for approximately the past 1 month. No testicular mass, anorexia, weight loss, fevers, night sweats, chest pain, shortness of breath, abdominal pain or nausea. He underwent excisional LN biopsy of right inguinal LN. He was told that he had likely lymphoma. He was advised to follow up as outpatient for PET scan, bone marrow biopsy. However, patient states he was lost to f/u. He never received any calls or appointment details. His aunt urged patient to seek care elsewhere and his PCP recommended [facility]. At our first meeting on 12/13/18, he tells me that he was working full time, feeling well until early Nov 2018. When he developed the left lower back pain, he went to [facility] and [facility]. No fevers, chills, night sweats. He has lost 15 lbs in the past month due to low appetite. No n/v/d.  No current constipation. No CP, SOB. No h/o cardiac disease aside from HTN, Hyperlipiemia. No hematochezia/melena, hematuria. He has HTN and XOL, which he was taking meds for, but has run out. Has no PCP currently. He is uninsured. He works as a cook, currently working part time. He has children aged 12 and 13. Interval history: Today, patient comes in for follow up and chemotherapy. Denies chest pain/sob. Denies n/v, constipation or diarrhea. He denies any recent fevers, chills, sweats, CP, SOB. He has a dry cough occasionally. Still has the lower back pain but reports improvement and only taking oxycodone daily to BID. His appetite has improved, eating at least 2 meals a day. Weight is steady at 146 lb has gained 2 lb. Still has not seen Cardiology and states he has not been notified of any appointments. 1050 am: Patient received pre-medications, tylenol and benadryl in [facility], then developed chest pain and vomited once. Subsequently, he collapsed onto the floor, had seizure like activity and no pulse, so CPR given for one minute and the patient was shocked with AED. He regained a pulse, but remained unconscious, EMS arrived around 11 am and safely transported the patient to the ED. FAMILY HISTORY:  His father has a history of leukemia, currently in remission, treated at [facility]. LYMPHATICS:  Bilateral palpable inguinal adenopathy. Past Medical History:   Diagnosis Date    Anxiety     Hyperlipidemia     Hypertension     Lymphadenopathy 11/12/2018      No past surgical history on file.    Social History     Tobacco Use    Smoking status: Current Every Day Smoker     Packs/day: 1.00     Years: 20.00     Pack years: 20.00    Smokeless tobacco: Never Used   Substance Use Topics    Alcohol use: No     Frequency: Never      Family History   Problem Relation Age of Onset    Hypertension Father     Diabetes Mother      Current Outpatient Medications   Medication Sig    oxyCODONE IR (ROXICODONE) 5 mg immediate release tablet Take 1 Tab by mouth every six (6) hours as needed for Pain. Max Daily Amount: 20 mg.    LORazepam (ATIVAN) 0.5 mg tablet Take 1 Tab by mouth every eight (8) hours as needed. Max Daily Amount: 1.5 mg.  predniSONE (DELTASONE) 20 mg tablet Take 100 mg by mouth daily. Take on Days 1 through 5 each cycle.  hydroCHLOROthiazide (HYDRODIURIL) 12.5 mg tablet Take 1 Tab by mouth daily. DO NOT TAKE for 5 DAYS. Do not take if dehydrated or not eating/drinking. Do not take if blood pressure is low (<707 systolic or <27 diastolic)    lisinopril (PRINIVIL, ZESTRIL) 10 mg tablet Take 1 Tab by mouth daily. DO NOT TAKE for 5 days    L. acidoph & paracasei- S therm- Bifido (AUSTIN-Q/RISAQUAD) 8 billion cell cap cap Take 1 Cap by mouth daily.  magic mouthwash solution Take 15-30 mL by mouth four (4) times daily. Magic mouth wash   Maalox  Lidocaine 2% viscous   Diphenhydramine oral solution    Swish and spit for mouth pain, ok to swallow for throat pain     Pharmacy to mix equal portions of ingredients to a total volume as indicated in the dispense amount.  atorvastatin (LIPITOR) 10 mg tablet Take 10 mg by mouth nightly.  prochlorperazine (COMPAZINE) 10 mg tablet Take 1 Tab by mouth every six (6) hours as needed.  senna-docusate (PERICOLACE) 8.6-50 mg per tablet Take 1 Tab by mouth daily.  ondansetron hcl (ZOFRAN) 8 mg tablet Take 1 Tab by mouth every eight (8) hours as needed for Nausea.  predniSONE (DELTASONE) 20 mg tablet Take 100 mg by mouth daily (with breakfast). (on days 1-5 of each chemo cycle)    naloxone (NARCAN) 4 mg/actuation nasal spray Use 1 spray intranasally, then discard. Repeat with new spray every 2 min as needed for opioid overdose symptoms, alternating nostrils.  aspirin delayed-release (ASPIR-81) 81 mg tablet Take 1 Tab by mouth daily. No current facility-administered medications for this visit.       Facility-Administered Medications Ordered in Other Visits   Medication Dose Route Frequency    sodium chloride 0.9% injection 10 mL  10 mL IntraVENous PRN    heparin (porcine) pf 300-500 Units  300-500 Units InterCATHeter PRN      No Known Allergies     Review of Systems: A complete review of systems was obtained, negative except as described above. Physical Exam:     There were no vitals taken for this visit. ECOG PS: 0  General: Well developed, no acute distress  Eyes: PERRLA, EOMI, anicteric sclerae  HENT: Atraumatic, OP clear, poor dentition, multiple dental caries, no erythema,TMs intact without erythema  Neck: Supple  Lymphatic: No supraclavicular, axillary. R cervical 2cm LN absent. Bilateral small 2-3cm palpable inguinal adenopathy  Respiratory: CTAB, normal respiratory effort  CV: Normal rate, regular rhythm, no murmurs, no peripheral edema  GI: Soft, nontender, nondistended, no masses, no hepatomegaly, no splenomegaly  MS: Normal gait and station. Digits without clubbing or cyanosis. TTP in left lower back  Skin: No rashes, ecchymoses, or petechiae. Normal temperature, turgor, and texture. Neuro/Psych: Alert, oriented. 5/5 strength in all 4 extremities. Appropriate affect, normal judgment/insight. Results:     Lab Results   Component Value Date/Time    WBC 9.9 01/24/2019 08:19 AM    HGB 13.2 01/24/2019 08:19 AM    HCT 39.2 01/24/2019 08:19 AM    PLATELET 685 92/18/3058 08:19 AM    MCV 89.5 01/24/2019 08:19 AM    ABS.  NEUTROPHILS 8.0 01/24/2019 08:19 AM    Hemoglobin (POC) 15.0 06/05/2009 02:13 PM    Hematocrit (POC) 44 06/05/2009 02:13 PM     Lab Results   Component Value Date/Time    Sodium 140 01/24/2019 08:19 AM    Potassium 3.9 01/24/2019 08:19 AM    Chloride 104 01/24/2019 08:19 AM    CO2 26 01/24/2019 08:19 AM    Glucose 150 (H) 01/24/2019 08:19 AM    BUN 8 01/24/2019 08:19 AM    Creatinine 1.17 01/24/2019 08:19 AM    GFR est AA >60 01/24/2019 08:19 AM    GFR est non-AA >60 01/24/2019 08:19 AM    Calcium 9.5 01/24/2019 08:19 AM    Sodium (POC) 139 2009 02:13 PM    Potassium (POC) 3.9 2009 02:13 PM    Chloride (POC) 103 2009 02:13 PM    Glucose (POC) 98 2009 02:13 PM    BUN (POC) 9 2009 02:13 PM    Creatinine (POC) 1.0 2009 02:13 PM    Calcium, ionized (POC) 1.21 2009 02:13 PM     Lab Results   Component Value Date/Time    Bilirubin, total 0.1 (L) 2019 08:19 AM    ALT (SGPT) 26 2019 08:19 AM    AST (SGOT) 19 2019 08:19 AM    Alk. phosphatase 87 2019 08:19 AM    Protein, total 7.2 2019 08:19 AM    Albumin 3.3 (L) 2019 08:19 AM    Globulin 3.9 2019 08:19 AM     No results found for: IRON, FE, TIBC, IBCT, PSAT, FERR    No results found for: B12LT, FOL, RBCF  Lab Results   Component Value Date/Time    TSH 1.53 2016 04:40 AM     18:     Lab Results   Component Value Date/Time    Hepatitis A, IgM NONREACTIVE 2018 04:53 PM    Hepatitis B surface Ag <0.10 2018 04:53 PM    Hepatitis B core, IgM NONREACTIVE 2018 04:53 PM         Imagin/9/18 Abd/pelvis CT: IMPRESSION:  1. Interval development of a large retroperitoneal mass encircling the aorta with invasion of the left renal hilum and left adrenal gland. Several adjacent lymph nodes are seen extending into the peritoneum and underneath the  diaphragmatic natalie. This most likely represents lymphoma. 2. Several new bilateral enlarged inguinal lymph nodes also likely representing lymphoma. 3. Moderate left hydronephrosis with a delayed renal nephrogram related to decreased renal function. This is related to the invasion of the renal hilum. 18 Chest CT: IMPRESSION:  Trace left pleural effusion. Bilateral lower lobe atelectasis. Large  retroperitoneal mass lesion again demonstrated. PET 18:  FINDINGS:  HEAD/NECK: Right palatine tonsil intense hypermetabolism, max SUV 18.  Multilevel  bilateral cervical adenopathy, with max SUV 12 in a left supraclavicular node.  Cerebral evaluation is limited by normal intense activity. CHEST: Solitary hypermetabolic left axillary node, max SUV 11. ABDOMEN/PELVIS: Bulky retroperitoneal mass max SUV 27, with several additional  small active abdomino-pelvic nodes. Bilateral inguinal nodes with max SUV 12 on  the left. SKELETON: No foci of abnormal hypermetabolism in the axial and visualized  appendicular skeleton. IMPRESSION:   1. Right palatine tonsil tumor involvement (Deauville 5). 2. Bilateral cervical delaney involvement (Deauville 5). 3. Left axillary node involvement (Deauville 5). 4. Bulky retroperitoneal lymphoma mass and additional smaller hypermetabolic  abdomino-pelvic nodes (Deauville 5). 5. Bilateral inguinal delaney involvement (Deauville 5). Deauville Five Point Scale  1. No uptake or no residual uptake (when used interim)  2. Slight uptake, but below blood pool (mediastinum)  3. Uptake above mediastinal, but below/equal to uptake in the liver  4. Uptake slightly to moderately higher than liver  5. Markedly increased uptake or any new lesion (on response evaluation)  Each FDG-avid (or previously FDG avid) lesion is rated independently. Reference values:  Mediastinal blood pool: 2.1 SUV  Liver (background): 2.2 SUV    PET/CT 2/05/19:   IMPRESSION:  1. No Foci of Abnormal Hypermetabolism (Deauville 1). 2. Resolved activity in the right palatine tonsil, bilateral cervical nodes,left axillary node, retroperitoneal/abdominal pelvic adenopathy, bilateral inguinal nodes. Assessment & Plan:   [place] Siemens is a 41 y.o. male comes in for evaluation and management of lymphoma. 1. Follicular lymphoma: Grade 3a. Although grade 3a disease is considered more indolent and can be treated like grade 1/2 disease, this patient has indications for treatment: bulky disease encircling the aorta causing symptoms. Bone marrow negative for lymphoma, but was hypercellular.  BR better than RCHOP, but based on GALLIUM study, Obinutuzumab-based induction and maintenance prolongs PFS over that seen with rituximab-based therapy. Therefore, I have chosen to treat patient with O-CHOP regimen for 6 cycles, followed by possible maintenance Obinutuzumab. We discussed the risks and benefits of O-CHOP chemotherapy. The potential side effects include, but are not limited to: nausea, vomiting, diarrhea, constipation, Flu-like symptoms, infusion reaction, allergic reaction, flushing, taste changes, increased risk of infection, anemia, fatigue, alopecia, neuropathy, nail changes, cardiac damage, mucositis, myleosuppression, infertility and rarely, death. Patient completed chemoteaching and received a chemotherapy education folder. -- Held cycle 3, day 1 of O-CHOP regimen today due to cardiac event. -- Neulasta on body today on day 2 of regimen when rescheduled. -- Pt knows to take Prednisone 100mg PO daily on days 1-5 when rescheduled  -- Patient is scheduled to return in 3 weeks but may need to modify due to recent cardiac event. 2. Use of cardiotoxic chemotherapy: Discussed risks/benefits of using doxorubicin. Echo on 12/17/18 showed EF 65%. -- Still needs Cardiology evaluation  -- Given contact number Dr. Pagan [place] [phone], referral placed and appointment made for late next week. 3. Neoplasm related pain / Anxiety: Left lower back pain. Taking Oxycodone 5mg bid prn. Signed pain contract on 12/28/18. Counseled on adding SSRI if anxiety becomes worse. -- Continue Oxycodone 5mg only twice a day, 80 tabs refilled 1/24/19  -- Ativan 0.5mg bid prn, 60 tabs written 1/24/18  -- Follow up with [facility] on 2/19/19, reports an increase in anxiety. 4. HTN / Hyperlipidemia: Uncontrolled as pt has been out of his meds for 1 weeks. -- On Lisinopril, HCTZ, Lipitor. 5. Tobacco abuse: Counseled on risk of smoking, benefits of quitting. Discussed cessation strategies. Still smoking a pack per day.     6. Dental infection: Caused neutropenic fever and hospital admission recently. No abscess. Blood cultures negative. Treated with IV antibiotics followed by Augmentin. -- Dental evaluation vs OMFS recommended. Killian Members in [address] met with him about affordable dental care options. -- Unable to afford dental care at this time. 7. Cardiac arrest: Unclear etiology at this time. Nursing in infusion center report patient complained of chest pain this morning, but thought it may be related to known anxiety. Pt was walking and collapsed onto the floor with shaking movements / seizure like activity. No known loss of bowel/bladder function. Nursing could not feel a pulse, placed pt on cardiac monitor which recommended CPR. Nurses performed CPR for 1 minute. Cardiac monitor then delivered 1 shock. Afterwards, rhythm monitoring did not recommend any further action. EMS arrived. Patient appeared to be breathing spontaneously throughout this episode, but he was not responsive. EMS took patient to [facility] ER. Emotional well being: Pt is coping well with his/her disease and has excellent support. Met with SW.    I appreciate the opportunity to participate in Mr. Aiden cabezas.     Signed By: Tejas Wiggins MD     February 14, 2019

## 2019-02-14 NOTE — PROGRESS NOTES
Called directly by ER for STAT portable for line placement, turned away immediately upon arrival to room for blood work, given no time estimate for return

## 2019-02-14 NOTE — ED NOTES
Ice bags placed on patient groin, axilla, chest.  Md at bedside placing Quattro catheter. Patient remains restless requiring max dose of Propofol, Additional Versed given in CT per MD order has had a total of 4 mg given.

## 2019-02-14 NOTE — PROGRESS NOTES
Spiritual Care Assessment/Progress Note  [address]      NAME: Marlo Mackay      MRN: [id number]  AGE: 41 y.o. SEX: male  Cheondoism Affiliation: Restorationism   Language: English     2/14/2019     Total Time (in minutes): 95     Spiritual Assessment begun in [facility] EMERGENCY DEPT through conversation with:         []Patient        [x] Family    [] Friend(s)        Reason for Consult: Request by staff     Spiritual beliefs Per Father: (Please include comment if needed)     [x] Identifies with a michelle tradition:   Ariana Washington       [] Supported by a michelle community:            [] Claims no spiritual orientation:           [] Seeking spiritual identity:                [] Adheres to an individual form of spirituality:           [] Not able to assess:                           Identified resources for coping:      [] Prayer                               [] Music                  [] Guided Imagery     [] Family/friends                 [] Pet visits     [] Devotional reading                         [] Unknown     [] Other:                                           Interventions offered during this visit: (See comments for more details)          Family/Friend(s):  Affirmation of emotions/emotional suffering, Affirmation of michelle, Catharsis/review of pertinent events in supportive environment, Iconic (affirming the presence of God/Higher Power), Normalization of emotional/spiritual concerns, Prayer (assurance of), Prayer (actual)     Plan of Care:     [] Support spiritual and/or cultural needs    [] Support AMD and/or advance care planning process      [] Support grieving process   [] Coordinate Rites and/or Rituals    [] Coordination with community clergy   [] No spiritual needs identified at this time   [] Detailed Plan of Care below (See Comments)  [] Make referral to Music Therapy  [] Make referral to Pet Therapy     [] Make referral to Addiction services  [] Make referral to [facility]  [] Make referral to Spiritual Care Partner  [] No future visits requested        [x] Follow up visits as needed     Comments: Responded to request for  to the ER. Mr. Gris Amaya, father Beatrice Solorio and Ashia Bradshaw were in the family room. They shared Riya's struggle with his disease over the past few months and how things had improved. Yaya Steinberg has an exwife and two teenage children who live with his ex. His father Beatrice Solorio has some medical issues as well. Provided supportive spiritual presence as they shared some of their life's journey. Sherrill requested prayer, we spent some time in prayer. Consulted with staff and kept family aware of next steps as prprerations were made to transfer Chavo Falcon to [facility]. Provided assurance of prayers as Darby Goldberg went to Riya's side just prior to rolling to the Ambulance. Advised of  Availability at [facility] should they need more assistance.     Visited by: Zayra Keys, MS., [address] [facility] Venkat Jenkins ([id number])

## 2019-02-14 NOTE — ED PROVIDER NOTES
41 y.o. male with past medical history significant for lymphoma, HTN, hyperlipidemia, anxiety, and lymphadenopathy who presents to the ED via EMS with chief complaint of S/P cardiac arrest. EMS reports pt was at the outpatient infusion center today and prior to receiving his treatment for lymphoma he began complaining of chest pain and then went unresponsive. Per EMS, CPR was performed for 1 minute and one shock was administered. Per EMS, the pt regained a pulse and was showing sinus tachycardia on the monitor en route. Per EMS, pt was responsive only to painful stimuli en route. Upon arrival to the ED, pt is altered and actively retching. There are no other acute medical complaints voiced at this time. Full history, physical exam, and ROS unable to be obtained due to: altered mental status. Social Hx: Smoker. Denies EtOH use. PCP: None    Note written by Elidia Estevez, as dictated by Dave Ruiz MD 11:26 AM       The history is provided by the EMS personnel. The history is limited by the condition of the patient. Past Medical History:   Diagnosis Date    Anxiety     Hyperlipidemia     Hypertension     Lymphadenopathy 11/12/2018       History reviewed. No pertinent surgical history.       Family History:   Problem Relation Age of Onset    Hypertension Father     Diabetes Mother        Social History     Socioeconomic History    Marital status: SINGLE     Spouse name: Not on file    Number of children: Not on file    Years of education: Not on file    Highest education level: Not on file   Social Needs    Financial resource strain: Not on file    Food insecurity - worry: Not on file    Food insecurity - inability: Not on file   [employer] needs - medical: Not on file   [employer] needs - non-medical: Not on file   Occupational History    Not on file   Tobacco Use    Smoking status: Current Every Day Smoker     Packs/day: 1.00     Years: 20.00     Pack years: 20.00    Smokeless tobacco: Never Used   Substance and Sexual Activity    Alcohol use: No     Frequency: Never    Drug use: No    Sexual activity: Yes   Other Topics Concern    Not on file   Social History Narrative    Not on file         ALLERGIES: Patient has no known allergies. Review of Systems   Unable to perform ROS: Mental status change       Vitals:    02/14/19 1137   BP: (!) 137/105   Pulse: 97   Resp: 14   SpO2: 100%   Weight: 66.3 kg (146 lb 2.6 oz)   Height: 5' 7\" (1.702 m)            Physical Exam   Constitutional: He appears well-developed and well-nourished. Altered and agitated; not responding to verbal or painful stimuli. HENT:   Head: Normocephalic and atraumatic. Eyes: Conjunctivae are normal.   Neck: Neck supple. No tracheal deviation present. Cardiovascular: Normal rate, regular rhythm, normal heart sounds and intact distal pulses. Exam reveals no gallop and no friction rub. No murmur heard. Pulmonary/Chest: Effort normal and breath sounds normal.   Abdominal: Soft. There is no tenderness. Musculoskeletal: He exhibits no edema or deformity. Neurological:   Unresponsive, agitated, wretching   Skin: Skin is warm and dry. Psychiatric:   Unable to assess   Nursing note and vitals reviewed. MDM       Intubation  Date/Time: 2/14/2019 11:33 AM  Performed by: Lupillo Redd MD  Authorized by: Lupillo Redd MD     Consent:     Consent obtained:  Emergent situation  Pre-procedure details:     Patient status:  Altered mental status    Mallampati score: I    Pretreatment meds: 30 mg etomidate. Paralytics:  Succinylcholine (100 mg)  Procedure details:     Preoxygenation:  None    CPR in progress: no      Intubation method:  Oral    Oral intubation technique:  Direct    Laryngoscope blade:   Mac 4    Tube size (mm):  8.0    Tube type:  Cuffed    Number of attempts:  1    Cricoid pressure: yes      Tube visualized through cords: yes    Placement assessment: ETT to lip:  21    Tube secured with:  ETT edouard    Breath sounds:  Equal    Placement verification: chest rise, CXR verification, equal breath sounds and ETCO2 detector    Post-procedure details:     Patient tolerance of procedure: Tolerated well, no immediate complications    Central Line  Date/Time: 2/14/2019 12:50 PM  Performed by: Dave Rose MD  Authorized by: Dave Rose MD     Consent:     Consent obtained:  Emergent situation  Pre-procedure details:     Hand hygiene: Hand hygiene performed prior to insertion      Sterile barrier technique: All elements of maximal sterile technique followed      Skin preparation:  ChloraPrep    Skin preparation agent: Skin preparation agent completely dried prior to procedure    Sedation:     Sedation type:  Deep  Anesthesia (see MAR for exact dosages): Anesthesia method:  Local infiltration    Local anesthetic:  Lidocaine 1% w/o epi  Procedure details:     Location:  R femoral    Patient position:  Flat    Procedural supplies:  Triple lumen    Landmarks identified: yes      Ultrasound guidance: no      Number of attempts:  1    Successful placement: yes    Post-procedure details:     Post-procedure:  Dressing applied and line sutured    Patient tolerance of procedure: Tolerated well, no immediate complications    Note written by Elidia Malloy, as dictated by Dave Rose MD 11:33 AM    ED EKG interpretation:  Rhythm: normal sinus rhythm; and regular . Rate (approx.): 100; ST/T wave: non-specific changes; significant changes compared to EKG from November 2018. Note written by Elidia Malloy, as dictated by Dave Rose MD 11:36 PM    CONSULT NOTE:  12:20 PM Dave Rose MD spoke with Dr. Marko Banegas for Cardiology at [facility]. Discussed available diagnostic tests and clinical findings. Dr. Olivia Truong agrees with plan for Code ICE and transfer to [facility].      CONSULT NOTE:  12:32 PM Dave Rose MD spoke with  James Kelley for Cardiology at [facility]. Discussed available diagnostic tests and clinical findings. Dr. Lionel Ellington agrees with plan for Code Ice and transfer to [facility]. CONSULT NOTE:  1:10 PM Douglas Grossman MD spoke with Dr. Fadumo Menendez, Consult for Hospitalist at [facility]. Discussed available diagnostic tests and clinical findings. Dr. Fadumo Menendez agrees with plan for Code Ice and transfer to [facility] and accepts pt for transfer. Total critical care time spent exclusive of procedures:  70 min. Pamela Avlia MD     A/P: S/P cardiac arrest/Code Ice - has underlying lymphoma; collapsed at [facility]; CPR initiated and shocked times one; very altered upon arrival to ED; intubated; required large amounts of Versed and Diprivan as we had trouble getting him sedated; head and chest CT's ok; EKG ischemic changes but no STEMI; Code Ice catheter placed; transfer to [facility].   Pamela Avila MD

## 2019-02-14 NOTE — CARDIO/PULMONARY
Cardiac Rehab: Per EMR, patient is a current smoker.  Smoking Cessation Program information placed on the AVS.    Cecelia Austin RN

## 2019-02-14 NOTE — Clinical Note
Multiple views of the left main, left coronary artery, LAD and circumflex artery obtained using power injection.

## 2019-02-14 NOTE — PROGRESS NOTES
TRANSFER - IN REPORT:    Verbal report received from Carey Morejon, RN(name) on [facility]  being received from [facility](unit) for urgent transfer      Report consisted of patients Situation, Background, Assessment and   Recommendations(SBAR). Information from the following report(s) SBAR, ED Summary, [facility] and Cardiac Rhythm ST was reviewed with the receiving nurse. Opportunity for questions and clarification was provided. Assessment completed upon patients arrival to unit and care assumed. 1430 Pt arrived via critical transport to the floor - assumed care of pt. Pt very restless & agitated, not following commands. ICE     Primary Nurse Elizabeth Zuniga and Kassandra Cisneros, ADA performed a dual skin assessment on this patient No impairment noted    Current Bed:   Total Care SPORT (air with burgundy cover)    Asim score is 11    1450 Dr. Emi Espinoza at bedside - order received for echo and to hold off on initiating ICE until after echo   1505 Echo at bedside - per Dr. Emi Espinoza will not ice pt at this time & pt to go to cath lab  1520 Pt continues to be very restless & becoming agitated - orders received from Dr. Emi Espinoza for fentanyl gtt   1530 EEG at bedside   1600 [facility] at bedside - report given & pt transported to cath lab  1745 TRANSFER - IN REPORT:    Verbal report received from Anila Carranza RN(name) on [facility]  being received from [facility](unit) for routine post - op      Report consisted of patients Situation, Background, Assessment and   Recommendations(SBAR). Information from the following report(s) SBAR, Kardex, Procedure Summary, Intake/Output, MAR and Cardiac Rhythm NSR was reviewed with the receiving nurse. Opportunity for questions and clarification was provided. Assessment completed upon patients arrival to unit and care assumed.      [address] w/ Dr. Moses Miller- updated on pts status - will be by to see pt.  Sowmya Prescott w/ Dr. Emi Espinoza - pts MAP 60 - orders received for John with a goal of SBP > 100-110  1930 Bedside and Verbal shift change report given to Marti Julian RN (oncoming nurse) by Silvano Walden RN (offgoing nurse). Report included the following information SBAR, Kardex, ED Summary, Procedure Summary, Intake/Output, MAR, Recent Results and Cardiac Rhythm NSR.

## 2019-02-14 NOTE — PROGRESS NOTES
Problem: Chemotherapy Treatment  Goal: *Chemotherapy regimen followed  Outcome: Progressing Towards Goal  Pt receiving O-CHOP C2 D8

## 2019-02-14 NOTE — Clinical Note
Lesion located in the Proximal LAD. Balloon inserted. Balloon inflated using multiple inflations inflation technique. Pressure = 10 cuba; Duration = 10 sec. Inflation 2: Pressure: 10 cuba; Duration: 7 sec.

## 2019-02-14 NOTE — PROGRESS NOTES
[facility]  Pulmonary, Critical Care, and Sleep Medicine  Name: Liliana Turner MRN: [id number]   : 1975 Hospital: [address]   Date: 2019          IMPRESSION:   1. S/p cardiac arrest- ? Rhythm? 2. EKG changes ? ischemic  3. Respiratory Failure/vent  4. AMS/agitation post arrest- neg head CT  5. Lymphoma stage IV- follicular; retroperitoneal mass- dx 2018- on chemo  6. R chest port-a-cath  7. H/o anxiety chronic benzos? RECOMMENDATIONS:   · Cath lab eval w Cards  · Vent support  · ICU protocol care    critically ill       Radiology  ( personally reviewed) CTA  Reviewed- no PE, bibasilar atx   ABG No results for input(s): PHI, PO2I, PCO2I in the last 72 hours. Subjective/Interval History:     Chart reviewed-  asked to see in consult vent management/resp failure  42 yo male w lymphoma awaiting to start chemo rx at out infusion center. .. Acute CP--> unresponsive  CPR x ~ 1 minute/ CV  ? sz    CHEMO rx:  Obinutuzumab-CHOP. Obinutuzumab: 1000 mg weekly on days 1, 8, 15 for cycle 1, then 1000 mg on day 1 q21 days for cycles 2-6, then monotherapy 1000 mg every 21 days for cycle 7, 8 with Cyclophosphamide 750mg/m2, Doxorubicin 50mg/m2, Vincristine 1.4mg/m2 on day 1 and Prednisone 100mg on Days 1-5, every 21 days for a total of 6 cycles. S/p cardiac arrest   To [facility] intubated  To [facility] for ?  Code ice    Sedated/responsive-- wildly agitated per reports  No code ICE  To cath lab imminently    Patient Active Problem List   Diagnosis Code    HTN (hypertension) I10    Tobacco abuse Z72.0    Left sided numbness R20.0    Hyperlipidemia E78.5    Retroperitoneal mass R19.00    Lymphadenopathy B79.1    Follicular lymphoma ([facility]) C82.90    Anxiety F41.9    Pain, dental K08.89    Sepsis ([facility]) A41.9    Neutropenic fever ([facility]) D70.9, R50.81     Past Medical History:   Diagnosis Date    Anxiety     Hyperlipidemia     Hypertension     Lymphadenopathy 2018 ROS unobtainable    No past surgical history on file. No Known Allergies     Prior to Admission Medications   Prescriptions Last Dose Informant Patient Reported? Taking? L. acidoph & paracasei- S therm- Bifido (AUSTIN-Q/RISAQUAD) 8 billion cell cap cap   No No   Sig: Take 1 Cap by mouth daily. LORazepam (ATIVAN) 0.5 mg tablet   No No   Sig: Take 1 Tab by mouth every eight (8) hours as needed. Max Daily Amount: 1.5 mg.   aspirin delayed-release (ASPIR-81) 81 mg tablet   No No   Sig: Take 1 Tab by mouth daily. atorvastatin (LIPITOR) 10 mg tablet   Yes No   Sig: Take 10 mg by mouth nightly. hydroCHLOROthiazide (HYDRODIURIL) 12.5 mg tablet   No No   Sig: Take 1 Tab by mouth daily. DO NOT TAKE for 5 DAYS. Do not take if dehydrated or not eating/drinking. Do not take if blood pressure is low (<701 systolic or <36 diastolic)   lisinopril (PRINIVIL, ZESTRIL) 10 mg tablet   No No   Sig: Take 1 Tab by mouth daily. DO NOT TAKE for 5 days   magic mouthwash solution   No No   Sig: Take 15-30 mL by mouth four (4) times daily. Magic mouth wash   Maalox  Lidocaine 2% viscous   Diphenhydramine oral solution    Swish and spit for mouth pain, ok to swallow for throat pain     Pharmacy to mix equal portions of ingredients to a total volume as indicated in the dispense amount. naloxone (NARCAN) 4 mg/actuation nasal spray   No No   Sig: Use 1 spray intranasally, then discard. Repeat with new spray every 2 min as needed for opioid overdose symptoms, alternating nostrils. ondansetron hcl (ZOFRAN) 8 mg tablet   No No   Sig: Take 1 Tab by mouth every eight (8) hours as needed for Nausea. oxyCODONE IR (ROXICODONE) 5 mg immediate release tablet   No No   Sig: Take 1 Tab by mouth every six (6) hours as needed for Pain. Max Daily Amount: 20 mg.   predniSONE (DELTASONE) 20 mg tablet   No No   Sig: Take 100 mg by mouth daily (with breakfast).  (on days 1-5 of each chemo cycle)   predniSONE (DELTASONE) 20 mg tablet   Yes No Sig: Take 100 mg by mouth daily. Take on Days 1 through 5 each cycle. prochlorperazine (COMPAZINE) 10 mg tablet   No No   Sig: Take 1 Tab by mouth every six (6) hours as needed. senna-docusate (PERICOLACE) 8.6-50 mg per tablet   No No   Sig: Take 1 Tab by mouth daily. Facility-Administered Medications: None       Social History     Socioeconomic History    Marital status: SINGLE     Spouse name: Not on file    Number of children: Not on file    Years of education: Not on file    Highest education level: Not on file   Social Needs    Financial resource strain: Not on file    Food insecurity - worry: Not on file    Food insecurity - inability: Not on file    Transportation needs - medical: Not on file   [employer] needs - non-medical: Not on file   Occupational History    Not on file   Tobacco Use    Smoking status: Current Every Day Smoker     Packs/day: 1.00     Years: 20.00     Pack years: 20.00    Smokeless tobacco: Never Used   Substance and Sexual Activity    Alcohol use: No     Frequency: Never    Drug use: No    Sexual activity: Yes   Other Topics Concern    Not on file   Social History Narrative    Not on file     Family History   Problem Relation Age of Onset    Hypertension Father     Diabetes Mother          Objective:     Mode Rate Tidal Volume Pressure FiO2 PEEP    ac  16  500     30  5     Vital Signs:     TMAX(24)      Intake/Output:   Last shift:         Last 3 shifts: No intake/output data recorded. [id number] intake or output data in the 24 hours ending 02/14/19 1534  EXAM:     Pale thin male   Alopecia  moves all  Orally intubated  No JVD  Chest clear ant  CV S1S2 RRR  Abd soft- decreased BS  No Cooley  LE no edema  R groin line  Skin intact pale  R chest port-a-cath      Data    I have personally reviewed data, flowsheets for the last 24 hours.         Labs:  Recent Labs     02/14/19  1310 02/14/19  0835   WBC 31.2* 12.0*   HGB 12.3 13.2   HCT 36.5* 38.6    266     Recent Labs     02/14/19  1310 02/14/19  0835   * 136   K 4.0 3.6    100   CO2 22 26   * 128*   BUN 17 15   CREA 1.10 1.11   CA 9.0 9.5   ALB 3.3* 3.5   SGOT 28 13*   ALT 38 3038 Dimitri Covarrubias MD  [facility]

## 2019-02-14 NOTE — ED NOTES
ABG drawn by Critical transport team upon arrival to the ED, patient paralyzed by critical transport team Propofol decreased to 20mcg/kg per transport team.

## 2019-02-14 NOTE — PROGRESS NOTES
1715 - TRANSFER - OUT REPORT:    Verbal report given to SANTI RN (name) on [facility] Reagan  being transferred to CCU(unit) for routine progression of care       Report consisted of patients Situation, Background, Assessment and   Recommendations(SBAR). Information from the following report(s) Procedure Summary, MAR and Cardiac Rhythm NSR was reviewed with the receiving nurse. Lines:   Triple Lumen QUATTRO 02/14/19 Anterior;Proximal;Right Femoral (Active)   Central Line Being Utilized Yes 2/14/2019  2:57 PM   Criteria for Appropriate Use Hemodynamically unstable, requiring monitoring lines, vasopressors, or volume resuscitation 2/14/2019  2:57 PM   Site Assessment Clean, dry, & intact 2/14/2019  2:57 PM   Infiltration Assessment 0 2/14/2019  2:57 PM   Affected Extremity/Extremities Color distal to insertion site pink (or appropriate for race); Pulses palpable;Range of motion performed 2/14/2019  2:57 PM   Date of Last Dressing Change 02/14/19 2/14/2019  2:57 PM   Dressing Status Clean, dry, & intact 2/14/2019  2:57 PM   Dressing Type Disk with Chlorhexadine gluconate (CHG); Transparent 2/14/2019  2:57 PM   Action Taken Open ports on tubing capped 2/14/2019  2:57 PM   Proximal Hub Color/Line Status White 2/14/2019  2:57 PM   Positive Blood Return (Medial Site) Yes 2/14/2019  2:57 PM   Medial Hub Color/Line Status Blue 2/14/2019  2:57 PM   Positive Blood Return (Lateral Site) Yes 2/14/2019  2:57 PM   Distal Hub Color/Line Status Brown 2/14/2019  2:57 PM   Positive Blood Return (Site #3) Yes 2/14/2019  2:57 PM   Alcohol Cap Used Yes 2/14/2019  2:57 PM       Venous Access Device Bard PowerPort 12/20/18 Upper chest (subclavicular area, right (Active)   Central Line Being Utilized Yes 2/14/2019  2:43 PM   Criteria for Appropriate Use Irritant/vesicant medication 2/14/2019  2:43 PM   Site Assessment Clean, dry, & intact 2/14/2019  2:43 PM   Date of Last Dressing Change 02/14/19 2/14/2019  2:43 PM   Dressing Status Clean, dry, & intact 2/14/2019  2:43 PM   Dressing Type Transparent;Disk with Chlorhexadine gluconate (CHG) 2/14/2019  2:43 PM   Action Taken Open ports on tubing capped 2/14/2019  2:43 PM   Date Accessed (Medial Site) 02/14/19 2/14/2019  2:43 PM   Access Time (Medial Site) 0820 2/14/2019  8:20 AM   Access Needle Size (Site #1) 20 G 2/14/2019  8:20 AM   Access Needle Length ([facility]) 0.75 inches 2/14/2019  8:20 AM   Positive Blood Return ([facility]) Yes 2/14/2019  8:20 AM   Action Taken ([facility]) Blood drawn;Flushed 2/14/2019  8:20 AM        Opportunity for questions and clarification was provided.       Patient transported with:   Monitor  O2 @ 10 liters  Registered Nurse  [email or web address]

## 2019-02-14 NOTE — ED TRIAGE NOTES
Triage: Patient was at [facility] has Lymphoma had not received treatment today. Complained of C/P became unresponsive was assisted to the floor. EMS was called for seizures CPR completed for 1 minute, x1 shock unknown rhythm. Vomiting on arrival.  ST on EKG per EMS.

## 2019-02-14 NOTE — Clinical Note
TRANSFER - OUT REPORT:  
 
Verbal report given to: SANTI CCU RN. Report consisted of patient's Situation, Background, Assessment and  
Recommendations(SBAR). Opportunity for questions and clarification was provided. Patient transported with a Registered Nurse, [address] [place] [address] / Patient [facility] Tech, Monitor and Oxygen. Patient transported to: CCU.

## 2019-02-14 NOTE — PROGRESS NOTES
[facility] VISIT NOTE    0815  Pt arrived at [facility] and in no distress for O CHOP  D1 C3. Assessment completed, pt w/o complaints     Right  chest port accessed with . 75 in joshi with no difficulty. Positive blood return noted and labs drawn. Medications received:  Tylenol PO  Benadryl IVP  NS 500ml bolus    Pt went upstairs to MD appt, after returning, pt complained of left chest pain. Pt stated the\" it feels like pressure, but Im not sure if it is my anxiety\" Instructed pt to alert RN if pain became worse. Pt very restless. Notified MD, awaiting orders for pt. Pt vomited X1. MD wants pt to go to hospital for EKG. Pt was up walking around the nurses station when pt started to collapse but was assisted to the floor. Pt then appeared to have seizure activity. Became unresponsive and no pulse. AED and oxygen placed on pt and 1 shock administered and CPR performed for over 1 minute. Notified MD. Pt began breathing and a pulse was obtained. MD and NP arrived at scene and events explained to them. 1100 EMS arrived pt stable and transported to [facility] ED.

## 2019-02-14 NOTE — ED NOTES
An additional 2 mg of Versed given to patient while MD places Quattro Cath patient remains restless pulling at lines requiring several personal to hold patient and protect from injury.

## 2019-02-14 NOTE — ED NOTES
Pt to CT with RT and primary nurse. Versed 2 mg given via verbal order from Dr. Joelle Blunt due to pt biting tube, inability to stay still for CT scan.

## 2019-02-14 NOTE — PROCEDURES
Cardiac Catheterization Procedure Note   Patient: Jag Cheney  MRN: [id number]  SSN: [id number]   YOB: 1975 Age: 41 y.o.   Sex: male    Date of Procedure: 2/14/2019   Pre-procedure Diagnosis: cardiac arrest  Post-procedure Diagnosis: Coronary Artery Disease  Procedure: Left Heart Cath, PCI and to LAD  :  Dr. Modesta Traore MD    Assistant(s):  None  Anesthesia: Moderate Sedation   Estimated Blood Loss: Less than 10 mL   Specimens Removed: None  Findings: 95% string like stenosis of the proximal LAD with ROBERTO 2 flow (culrpit), nonocclusive disease of the CX,  of distal RCA, 4 mm Hg; PTCA of prox LAD with a 2.25 x 26 mm Crook TOM stent deployed after the lesion was ballooned with a 2.0 balloon with an excellent angiographic result with ROBERTO 3 flow  Complications: None   Implants:  None  Signed by:  Modesta Traore MD  2/14/2019  5:24 PM

## 2019-02-14 NOTE — ROUTINE PROCESS
TRANSFER - OUT REPORT:    Verbal report given to Corazon RN(name) on Marlo Mackay  being transferred to CCU(unit) for urgent transfer       Report consisted of patients Situation, Background, Assessment and   Recommendations(SBAR). Information from the following report(s) ED Summary was reviewed with the receiving nurse. Lines:   Venous Access Device Bard PowerPort 12/20/18 Upper chest (subclavicular area, right (Active)   Central Line Being Utilized Yes 2/14/2019  8:20 AM   Criteria for Appropriate Use Irritant/vesicant medication 2/14/2019  8:20 AM   Site Assessment Clean, dry, & intact 2/14/2019  8:20 AM   Date of Last Dressing Change 02/14/19 2/14/2019  8:20 AM   Dressing Status New 2/14/2019  8:20 AM   Dressing Type Transparent 2/14/2019  8:20 AM   Date Accessed (Medial Site) 02/14/19 2/14/2019  8:20 AM   Access Time (Medial Site) 0820 2/14/2019  8:20 AM   Access Needle Size (Site #1) 20 G 2/14/2019  8:20 AM   Access Needle Length (Medial Site) 0.75 inches 2/14/2019  8:20 AM   Positive Blood Return (Medial Site) Yes 2/14/2019  8:20 AM   Action Taken (Medial Site) Blood drawn;Flushed 2/14/2019  8:20 AM        Opportunity for questions and clarification was provided.       Patient transported with:   Monitor  O2 @ vent liters  Registered Nurse

## 2019-02-14 NOTE — CONSULTS
Cardiology Note dictated # [id number]  Impression:  Cardiac arrest at the OP infusion center at [facility] with 1 reported AED shock and CPR with ROSC within 1 minute. No report on the rhythm that was seen at the time of his arrest/AED shock  Moving arms and legs but is not coherent  His post arrest EKG's show anterior T wave inversion; initial trop 0.06  Bedside echo shows normal LV size and systolic function with mild anteroseptal hypokinesis  He is hemodynamically stable   Recommendation:  Cardiac cath to assess  No code ice at this time  Further recommendations to follow. I spoke with his father, Pastor Martinez [phone], by phone to discuss cath. He is in his car and will be here to discuss in person in a few more minutes.

## 2019-02-15 ENCOUNTER — TELEPHONE (OUTPATIENT)
Dept: PALLATIVE CARE | Age: 44
End: 2019-02-15

## 2019-02-15 ENCOUNTER — APPOINTMENT (OUTPATIENT)
Dept: GENERAL RADIOLOGY | Age: 44
DRG: 174 | End: 2019-02-15
Attending: INTERNAL MEDICINE
Payer: MEDICAID

## 2019-02-15 LAB
ALBUMIN SERPL-MCNC: 2.9 G/DL (ref 3.5–5)
ALBUMIN/GLOB SERPL: 1.1 {RATIO} (ref 1.1–2.2)
ALP SERPL-CCNC: 90 U/L (ref 45–117)
ALT SERPL-CCNC: 26 U/L (ref 12–78)
ANION GAP BLD CALC-SCNC: 15 MMOL/L (ref 10–20)
ANION GAP SERPL CALC-SCNC: 7 MMOL/L (ref 5–15)
APPEARANCE UR: CLEAR
ARTERIAL PATENCY WRIST A: NO
AST SERPL-CCNC: 21 U/L (ref 15–37)
ATRIAL RATE: 76 BPM
BACTERIA URNS QL MICRO: NEGATIVE /HPF
BASE DEFICIT BLD-SCNC: 3 MMOL/L
BDY SITE: ABNORMAL
BILIRUB SERPL-MCNC: 0.2 MG/DL (ref 0.2–1)
BILIRUB UR QL: NEGATIVE
BUN BLD-MCNC: 14 MG/DL (ref 9–20)
BUN SERPL-MCNC: 11 MG/DL (ref 6–20)
BUN/CREAT SERPL: 13 (ref 12–20)
CA-I BLD-MCNC: 1.24 MMOL/L (ref 1.12–1.32)
CALCIUM SERPL-MCNC: 8.3 MG/DL (ref 8.5–10.1)
CALCULATED P AXIS, ECG09: 50 DEGREES
CALCULATED R AXIS, ECG10: 70 DEGREES
CALCULATED T AXIS, ECG11: 87 DEGREES
CHLORIDE BLD-SCNC: 102 MMOL/L (ref 98–107)
CHLORIDE SERPL-SCNC: 107 MMOL/L (ref 97–108)
CO2 BLD-SCNC: 24 MMOL/L (ref 21–32)
CO2 SERPL-SCNC: 23 MMOL/L (ref 21–32)
COLOR UR: NORMAL
CREAT BLD-MCNC: 0.9 MG/DL (ref 0.6–1.3)
CREAT SERPL-MCNC: 0.87 MG/DL (ref 0.7–1.3)
DIAGNOSIS, 93000: NORMAL
EPITH CASTS URNS QL MICRO: NORMAL /LPF
ERYTHROCYTE [DISTWIDTH] IN BLOOD BY AUTOMATED COUNT: 16.2 % (ref 11.5–14.5)
GAS FLOW.O2 O2 DELIVERY SYS: ABNORMAL L/MIN
GLOBULIN SER CALC-MCNC: 2.6 G/DL (ref 2–4)
GLUCOSE BLD STRIP.AUTO-MCNC: 249 MG/DL (ref 65–100)
GLUCOSE BLD-MCNC: 121 MG/DL (ref 65–100)
GLUCOSE SERPL-MCNC: 81 MG/DL (ref 65–100)
GLUCOSE UR STRIP.AUTO-MCNC: NEGATIVE MG/DL
HCO3 BLD-SCNC: 22.6 MMOL/L (ref 22–26)
HCT VFR BLD AUTO: 32.2 % (ref 36.6–50.3)
HCT VFR BLD CALC: 39 % (ref 36.6–50.3)
HGB BLD-MCNC: 10.5 G/DL (ref 12.1–17)
HGB UR QL STRIP: NEGATIVE
HYALINE CASTS URNS QL MICRO: NORMAL /LPF (ref 0–5)
KETONES UR QL STRIP.AUTO: NEGATIVE MG/DL
LEUKOCYTE ESTERASE UR QL STRIP.AUTO: NEGATIVE
MCH RBC QN AUTO: 29.7 PG (ref 26–34)
MCHC RBC AUTO-ENTMCNC: 32.6 G/DL (ref 30–36.5)
MCV RBC AUTO: 91.2 FL (ref 80–99)
NITRITE UR QL STRIP.AUTO: NEGATIVE
NRBC # BLD: 0 K/UL (ref 0–0.01)
NRBC BLD-RTO: 0 PER 100 WBC
O2/TOTAL GAS SETTING VFR VENT: 30 %
P-R INTERVAL, ECG05: 124 MS
PCO2 BLD: 39.9 MMHG (ref 35–45)
PEEP RESPIRATORY: 5 CMH2O
PH BLD: 7.36 [PH] (ref 7.35–7.45)
PH UR STRIP: 5.5 [PH] (ref 5–8)
PLATELET # BLD AUTO: 251 K/UL (ref 150–400)
PMV BLD AUTO: 10.8 FL (ref 8.9–12.9)
PO2 BLD: 123 MMHG (ref 80–100)
POTASSIUM BLD-SCNC: 3.9 MMOL/L (ref 3.5–5.1)
POTASSIUM SERPL-SCNC: 3.7 MMOL/L (ref 3.5–5.1)
PRESSURE SUPPORT SETTING VENT: 5 CMH2O
PROT SERPL-MCNC: 5.5 G/DL (ref 6.4–8.2)
PROT UR STRIP-MCNC: NEGATIVE MG/DL
Q-T INTERVAL, ECG07: 442 MS
QRS DURATION, ECG06: 90 MS
QTC CALCULATION (BEZET), ECG08: 497 MS
RBC # BLD AUTO: 3.53 M/UL (ref 4.1–5.7)
RBC #/AREA URNS HPF: NORMAL /HPF (ref 0–5)
SAO2 % BLD: 99 % (ref 92–97)
SERVICE CMNT-IMP: ABNORMAL
SERVICE CMNT-IMP: ABNORMAL
SODIUM BLD-SCNC: 136 MMOL/L (ref 136–145)
SODIUM SERPL-SCNC: 137 MMOL/L (ref 136–145)
SP GR UR REFRACTOMETRY: 1.01 (ref 1–1.03)
SPECIMEN TYPE: ABNORMAL
TOTAL RESP. RATE, ITRR: 15
UROBILINOGEN UR QL STRIP.AUTO: 0.2 EU/DL (ref 0.2–1)
VENTILATION MODE VENT: ABNORMAL
VENTRICULAR RATE, ECG03: 76 BPM
WBC # BLD AUTO: 17.2 K/UL (ref 4.1–11.1)
WBC URNS QL MICRO: NORMAL /HPF (ref 0–4)

## 2019-02-15 PROCEDURE — 74011000250 HC RX REV CODE- 250

## 2019-02-15 PROCEDURE — 71045 X-RAY EXAM CHEST 1 VIEW: CPT

## 2019-02-15 PROCEDURE — 74011250636 HC RX REV CODE- 250/636: Performed by: INTERNAL MEDICINE

## 2019-02-15 PROCEDURE — 94640 AIRWAY INHALATION TREATMENT: CPT

## 2019-02-15 PROCEDURE — 36600 WITHDRAWAL OF ARTERIAL BLOOD: CPT

## 2019-02-15 PROCEDURE — 82962 GLUCOSE BLOOD TEST: CPT

## 2019-02-15 PROCEDURE — 74011000258 HC RX REV CODE- 258: Performed by: INTERNAL MEDICINE

## 2019-02-15 PROCEDURE — 77030029065 HC DRSG HEMO QCLOT ZMED -B

## 2019-02-15 PROCEDURE — 74011000250 HC RX REV CODE- 250: Performed by: INTERNAL MEDICINE

## 2019-02-15 PROCEDURE — 85027 COMPLETE CBC AUTOMATED: CPT

## 2019-02-15 PROCEDURE — 80053 COMPREHEN METABOLIC PANEL: CPT

## 2019-02-15 PROCEDURE — 77030029684 HC NEB SM VOL KT MONA -A

## 2019-02-15 PROCEDURE — 74011250637 HC RX REV CODE- 250/637: Performed by: INTERNAL MEDICINE

## 2019-02-15 PROCEDURE — 94760 N-INVAS EAR/PLS OXIMETRY 1: CPT

## 2019-02-15 PROCEDURE — 36415 COLL VENOUS BLD VENIPUNCTURE: CPT

## 2019-02-15 PROCEDURE — 82803 BLOOD GASES ANY COMBINATION: CPT

## 2019-02-15 PROCEDURE — 94003 VENT MGMT INPAT SUBQ DAY: CPT

## 2019-02-15 PROCEDURE — 81001 URINALYSIS AUTO W/SCOPE: CPT

## 2019-02-15 PROCEDURE — 65660000000 HC RM CCU STEPDOWN

## 2019-02-15 RX ORDER — BACITRACIN 500 UNIT/G
1 PACKET (EA) TOPICAL ONCE
Status: COMPLETED | OUTPATIENT
Start: 2019-02-15 | End: 2019-02-15

## 2019-02-15 RX ORDER — LORAZEPAM 0.5 MG/1
0.5 TABLET ORAL
Status: DISCONTINUED | OUTPATIENT
Start: 2019-02-15 | End: 2019-02-17 | Stop reason: HOSPADM

## 2019-02-15 RX ORDER — SODIUM CHLORIDE 9 MG/ML
5 INJECTION, SOLUTION INTRAVENOUS CONTINUOUS
Status: DISCONTINUED | OUTPATIENT
Start: 2019-02-15 | End: 2019-02-17 | Stop reason: HOSPADM

## 2019-02-15 RX ORDER — BACITRACIN 500 UNIT/G
PACKET (EA) TOPICAL
Status: COMPLETED
Start: 2019-02-15 | End: 2019-02-15

## 2019-02-15 RX ORDER — OXYCODONE HYDROCHLORIDE 5 MG/1
5 TABLET ORAL
Status: DISCONTINUED | OUTPATIENT
Start: 2019-02-15 | End: 2019-02-17 | Stop reason: HOSPADM

## 2019-02-15 RX ORDER — VANCOMYCIN HYDROCHLORIDE
1250 EVERY 12 HOURS
Status: DISCONTINUED | OUTPATIENT
Start: 2019-02-15 | End: 2019-02-16

## 2019-02-15 RX ORDER — HYDROCORTISONE SODIUM SUCCINATE 100 MG/2ML
100 INJECTION, POWDER, FOR SOLUTION INTRAMUSCULAR; INTRAVENOUS EVERY 8 HOURS
Status: DISCONTINUED | OUTPATIENT
Start: 2019-02-15 | End: 2019-02-16

## 2019-02-15 RX ADMIN — FAMOTIDINE 20 MG: 10 INJECTION, SOLUTION INTRAVENOUS at 22:14

## 2019-02-15 RX ADMIN — PIPERACILLIN SODIUM,TAZOBACTAM SODIUM 3.38 G: 3; .375 INJECTION, POWDER, FOR SOLUTION INTRAVENOUS at 04:09

## 2019-02-15 RX ADMIN — BACITRACIN 1 PACKET: 500 OINTMENT TOPICAL at 15:30

## 2019-02-15 RX ADMIN — ASPIRIN 300 MG: 300 SUPPOSITORY RECTAL at 10:04

## 2019-02-15 RX ADMIN — SODIUM CHLORIDE 5 ML/HR: 900 INJECTION, SOLUTION INTRAVENOUS at 02:25

## 2019-02-15 RX ADMIN — Medication 10 ML: at 05:31

## 2019-02-15 RX ADMIN — OXYCODONE HYDROCHLORIDE 5 MG: 5 TABLET ORAL at 19:15

## 2019-02-15 RX ADMIN — Medication 10 ML: at 22:00

## 2019-02-15 RX ADMIN — Medication 10 ML: at 14:45

## 2019-02-15 RX ADMIN — HYDROCORTISONE SODIUM SUCCINATE 100 MG: 100 INJECTION, POWDER, FOR SOLUTION INTRAMUSCULAR; INTRAVENOUS at 10:04

## 2019-02-15 RX ADMIN — SODIUM CHLORIDE 5 ML/HR: 900 INJECTION, SOLUTION INTRAVENOUS at 02:26

## 2019-02-15 RX ADMIN — PHENYLEPHRINE HYDROCHLORIDE 50 MCG/MIN: 10 INJECTION INTRAVENOUS at 05:31

## 2019-02-15 RX ADMIN — PROPOFOL 50 MCG/KG/MIN: 10 INJECTION, EMULSION INTRAVENOUS at 01:33

## 2019-02-15 RX ADMIN — CHLORHEXIDINE GLUCONATE 15 ML: 1.2 RINSE ORAL at 22:35

## 2019-02-15 RX ADMIN — PIPERACILLIN SODIUM,TAZOBACTAM SODIUM 3.38 G: 3; .375 INJECTION, POWDER, FOR SOLUTION INTRAVENOUS at 22:13

## 2019-02-15 RX ADMIN — FAMOTIDINE 20 MG: 10 INJECTION, SOLUTION INTRAVENOUS at 10:03

## 2019-02-15 RX ADMIN — PIPERACILLIN SODIUM,TAZOBACTAM SODIUM 3.38 G: 3; .375 INJECTION, POWDER, FOR SOLUTION INTRAVENOUS at 12:24

## 2019-02-15 RX ADMIN — PROPOFOL 50 MCG/KG/MIN: 10 INJECTION, EMULSION INTRAVENOUS at 10:45

## 2019-02-15 RX ADMIN — VANCOMYCIN HYDROCHLORIDE 1250 MG: 10 INJECTION, POWDER, LYOPHILIZED, FOR SOLUTION INTRAVENOUS at 09:33

## 2019-02-15 RX ADMIN — HYDROCORTISONE SODIUM SUCCINATE 100 MG: 100 INJECTION, POWDER, FOR SOLUTION INTRAMUSCULAR; INTRAVENOUS at 17:41

## 2019-02-15 RX ADMIN — IPRATROPIUM BROMIDE AND ALBUTEROL SULFATE 3 ML: .5; 3 SOLUTION RESPIRATORY (INHALATION) at 13:47

## 2019-02-15 RX ADMIN — PROPOFOL 50 MCG/KG/MIN: 10 INJECTION, EMULSION INTRAVENOUS at 05:53

## 2019-02-15 RX ADMIN — CLOPIDOGREL BISULFATE 75 MG: 75 TABLET, FILM COATED ORAL at 10:04

## 2019-02-15 RX ADMIN — IPRATROPIUM BROMIDE AND ALBUTEROL SULFATE 3 ML: .5; 3 SOLUTION RESPIRATORY (INHALATION) at 21:08

## 2019-02-15 RX ADMIN — Medication 1 PACKET: at 15:30

## 2019-02-15 RX ADMIN — VANCOMYCIN HYDROCHLORIDE 1250 MG: 10 INJECTION, POWDER, LYOPHILIZED, FOR SOLUTION INTRAVENOUS at 23:12

## 2019-02-15 RX ADMIN — OXYCODONE HYDROCHLORIDE 5 MG: 5 TABLET ORAL at 23:20

## 2019-02-15 RX ADMIN — CHLORHEXIDINE GLUCONATE 15 ML: 1.2 RINSE ORAL at 09:26

## 2019-02-15 NOTE — PROCEDURES
[address]  EEG    Name:  Rafa Rosales  MR#:  [id number]  :  1975  ACCOUNT #:  [de-identified]  DATE OF SERVICE:  2019      REQUESTING PHYSICIAN:  Jessica Yousif MD    HISTORY:  The patient is a 59-year-old male who is being evaluated after cardiac  arrest and for altered mental status. DESCRIPTION:  This is an 18-channel EEG performed on an intubated patient. The  dominant posterior background rhythm consists of medium voltage rhythms in the 8 Hz  frequency range. Faster frequencies and alpha rhythms were seen in the frontal  areas. Photic stimulation elicits a symmetric driving response. Hyperventilation  was not performed. Drowsiness and sleep architecture was not noted. EEG SUMMARY:  Normal study. CLINICAL INTERPRETATION:  This is a normal EEG, representing wakefulness. No  lateralizing or epileptiform features was noted.         Stefan Ruffin MD      [id number]  D:  2019 18:45  T:  02/15/2019 12:10  JOB #:  [id number]

## 2019-02-15 NOTE — PROGRESS NOTES
2000 Received report from [address] and assumed care. VSS, remains intubated and sedated, no signs of pain or distress. 2100 Left groin arterial sheath pulled, hand held pressure applied for 30 minutes, hemostasis achieved and dressing applied. 2150 Oral contrast provided via OGT for abdominal CT.  2320 Transported to CT with RT and 2 nurses. 2345 Complete chlorhexidine bed bath completed, tolerated well. 0200 Labs drawn. 0400 Portable chest x ray completed. 0730 Bedside and Verbal shift change report given to Heydi(oncoming nurse) by Salvador Leon (offgoing nurse). Report included the following information SBAR, Kardex, Intake/Output, MAR, Recent Results and Alarm Parameters .

## 2019-02-15 NOTE — CONSULTS
Devin Damon    Name:  Chelsea Palomino  MR#:  [id number]  :  1975  ACCOUNT #:  [de-identified]  DATE OF SERVICE:  2019    CARDIOLOGY CONSULTATION    REFERRING PHYSICIAN:  Sanjeev Stern MD    HISTORY OF PRESENT ILLNESS:   This is a 68-year-old patient who is being treated for  lymphoma. He was at the outpatient infusion center at [facility]  today when he had a cardiac arrest.  He received one AED shock and received CPR with  ROSC within 1 minute. He was transferred as a possible Code Ice. The patient is not  aware and alert, but he is moving all four extremities and requiring significant  amounts of sedation. His head CTA done at [facility] prior to transfer indicates no  acute intracranial findings. The patient had a CTA of the chest which showed  bibasilar atelectasis, no evidence of pulmonary embolism. An echocardiogram was done at the bedside, which demonstrated normal ejection  fraction, mild anteroseptal hypokinesis, normal valvular function. [facility] problem list indicates anxiety, hyperlipidemia, lymphoma, hypertension,  heavy smoker. ALLERGIES:  NO KNOWN ALLERGIES. MEDICATIONS AT HOME PRIOR TO ADMISSION:  1. Prednisone 20 mg tablets, he takes 100 mg daily on days 1 through 5 each cycle. 2.  Oxycodone. 3.  Ativan. 4.  HydroDIURIL. 5.  Lisinopril 10 mg daily. 6.  Probiotics. 7.  Atorvastatin 10 mg daily. 8.  Compazine 10 mg daily every 6 hours as needed. 9.  Giselle-Colace. 10.  Ondansetron 8 mg every 8 hours. PHYSICAL EXAMINATION:  GENERAL:  This is a well-developed, thin and otherwise healthy 68-year-old male. VITAL SIGNS:  Heart rate is 97, blood pressure is 110/70. HEENT:  Unremarkable. NECK:  Supple. Carotid pulses palpable on auscultation. No bruits. No neck vein  distention. CHEST:  Chest wall is unremarkable. LUNGS:  Anteriorly clear. HEART:  Regular and moderate rhythm.   No S4 gallop, S3 or friction rub. No  significant murmurs, thrills or heaves. ABDOMEN:  Soft. EXTREMITIES:  No edema. Normal pedal pulses. NEUROLOGIC:  The patient has altered mental status, but he is moving all four  extremities with no strength. His EKG demonstrates T-wave inversion in the anterior leads with some ST segment  depression as well. This is not a STEMI, this is consistent with ischemia. IMPRESSION:  The patient under cardiac arrest, possibly and probably likely on the  basis of ischemic heart disease, yet this is not a ST-elevation myocardial  infarction. His ejection fraction is preserved and he is moving all four extremities  and was revived very quickly after his event. PLAN:  1. We will postpone Ice for now. 2.  We will discuss this with the patient's father, and if he agrees, we will take  him to the cath lab as soon as possible. 3.  Further recommendations to follow.       Linda Hoyos MD      [id number]  D:  02/14/2019 15:33  T:  02/15/2019 3:17  JOB #:  [id number]

## 2019-02-15 NOTE — PROGRESS NOTES
Hospitalist Progress Note  [address] [facility],   Answering service: [phone] OR [phone] from in house phone        Date of Service:  2/15/2019  NAME:  Naga Wolfe  :  1975  MRN:  [id number]      Admission Summary:   41year old male with past medical history follicular lymphoma stage IV, presenting to [facility] as transfer post-cardiac arrest.     Interval history / Subjective:   Patient seen and examined. Sedated on vent. Per nursing, able to follow commands. Becomes agitated with minimal decrease in sedation. Possible plans for extubation pending cardiology follow up. Underwent LCH with PCI to LAD. Assessment & Plan:     Cardiac arrest:  -outside hospital cardiac arrest prior to receiving chemotherapy  -reported AED shock with CPR x 1 minute. ROSC. Intubated  -EKG with anterior T-wave inversion  -Code ICE deferred, patient able to move extremities     NSTEMI:  -s/p LHC with PCI to LAD  -continue aspirin, plavix    Acute respiratory failure:  -following cardiac arrest   -pulmonary consult for ventilator management    Follicular lymphoma, Stage IV:  -Currently undergoing O-CHOP  -consult oncology    Shock: unclear etiology  -patient with low dose pressors  -continue vancomycin, zosyn, stress steroids for now.  Follow cultures   -deescalate with clinical improvement  -on intermittent steroids with chemotherapy    Leukocytosis:   -suspect reactive and secondary to events above     Anemia, normocytic: continue to monitor, no evidence of bleeding    History of hypertension: hold home medications  Anxiety: on home benzos, hold  Chronic pain: has home prescription for pain medications      Code status: full  DVT prophylaxis: [address] discussed with: Nurse  Disposition: TBD     Hospital Problems  Date Reviewed: 2019          Codes Class Noted POA    ACS [facility]acute [facility] syndrome) ([facility] ICD-10-CM: I24.9  ICD-9-CM: 411.1  2019 Unknown        * (Principal) Cardiac arrest [facility]) ICD-10-CM: I46.9  ICD-9-CM: 427.5  2/14/2019 Yes                Review of Systems:   Unable to obtain      Vital Signs:    Last 24hrs VS reviewed since prior progress note. Most recent are:  Visit Vitals  /76   Pulse (!) 102   Temp 99.9 °F (37.7 °C)   Resp 19   Ht 5' 7\" (1.702 m)   Wt 68.1 kg (150 lb 2.1 oz)   SpO2 99%   BMI 23.51 kg/m²         Intake/Output Summary (Last 24 hours) at 2/15/2019 0959  Last data filed at 2/15/2019 0900  Gross per 24 hour   Intake 3587.21 ml   Output 2790 ml   Net 797.21 ml        Physical Examination:             Constitutional:  intubated, sedated   ENT:  Oral mucous moist, oropharynx benign. Neck supple,    Resp:  CTA bilaterally. No wheezing/rhonchi/rales. No accessory muscle use   CV:  Regular rhythm, normal rate, no murmurs, gallops, rubs    GI:  Soft, non distended, non tender. normoactive bowel sounds, no hepatosplenomegaly     Musculoskeletal:  No edema, warm, 2+ pulses throughout    Neurologic:  sedated, on vent, can move extremities and follow commands           Data Review:    Review and/or order of clinical lab test      Labs:     Recent Labs     02/15/19  0215 02/14/19  1310   WBC 17.2* 31.2*   HGB 10.5* 12.3   HCT 32.2* 36.5*    252     Recent Labs     02/15/19  0215 02/14/19  1310 02/14/19  0835    134* 136   K 3.7 4.0 3.6    101 100   CO2 23 22 26   BUN 11 17 15   CREA 0.87 1.10 1.11   GLU 81 119* 128*   CA 8.3* 9.0 9.5     Recent Labs     02/15/19  0215 02/14/19  2101 02/14/19  1310 02/14/19  0835   SGOT 21  --  28 13*   ALT 26  --  38 22   AP 90  --  100 109   TBILI 0.2  --  0.1* 0.1*   TP 5.5*  --  6.6 7.1   ALB 2.9*  --  3.3* 3.5   GLOB 2.6  --  3.3 3.6   AML  --  88  --   --    LPSE  --  78  --   --      No results for input(s): INR, PTP, APTT in the last 72 hours. No lab exists for component: INREXT   No results for input(s): FE, TIBC, PSAT, FERR in the last 72 hours.    No results found for: FOL, RBCF   No results for input(s): PH, PCO2, PO2 in the last 72 hours.   Recent Labs     02/14/19  1310   TROIQ 0.06*     Lab Results   Component Value Date/Time    Cholesterol, total 170 09/28/2016 04:40 AM    HDL Cholesterol 23 09/28/2016 04:40 AM    LDL, calculated 96.4 09/28/2016 04:40 AM    Triglyceride 253 (H) 09/28/2016 04:40 AM    CHOL/HDL Ratio 7.4 (H) 09/28/2016 04:40 AM     Lab Results   Component Value Date/Time    Glucose (POC) 98 06/05/2009 02:13 PM     Lab Results   Component Value Date/Time    Color YELLOW/STRAW 02/15/2019 02:15 AM    Appearance CLEAR 02/15/2019 02:15 AM    Specific gravity 1.010 02/15/2019 02:15 AM    Specific gravity 1.012 01/09/2019 03:48 PM    pH (UA) 5.5 02/15/2019 02:15 AM    Protein NEGATIVE  02/15/2019 02:15 AM    Glucose NEGATIVE  02/15/2019 02:15 AM    Ketone NEGATIVE  02/15/2019 02:15 AM    Bilirubin NEGATIVE  02/15/2019 02:15 AM    Urobilinogen 0.2 02/15/2019 02:15 AM    Nitrites NEGATIVE  02/15/2019 02:15 AM    Leukocyte Esterase NEGATIVE  02/15/2019 02:15 AM    Epithelial cells FEW 02/15/2019 02:15 AM    Bacteria NEGATIVE  02/15/2019 02:15 AM    WBC 0-4 02/15/2019 02:15 AM    RBC 0-5 02/15/2019 02:15 AM         Medications Reviewed:     Current Facility-Administered Medications   Medication Dose Route Frequency    0.9% sodium chloride infusion  5 mL/hr IntraVENous CONTINUOUS    0.9% sodium chloride infusion  5 mL/hr IntraVENous CONTINUOUS    vancomycin (VANCOCIN) 1250 mg in  ml infusion  1,250 mg IntraVENous Q12H    hydrocortisone Sod Succ (PF) (SOLU-CORTEF) injection 100 mg  100 mg IntraVENous Q8H    propofol (DIPRIVAN) infusion  0-50 mcg/kg/min IntraVENous TITRATE    fentaNYL (PF) 1,500 mcg/30 mL (50 mcg/mL) infusion  0-200 mcg/hr IntraVENous TITRATE    albuterol-ipratropium (DUO-NEB) 2.5 MG-0.5 MG/3 ML  3 mL Nebulization Q6H RT    famotidine (PF) (PEPCID) 20 mg in sodium chloride 0.9% 10 mL injection  20 mg IntraVENous Q12H    clopidogrel (PLAVIX) tablet 75 mg  75 mg Per NG tube DAILY    aspirin (ASA) suppository 300 mg  300 mg Rectal DAILY    chlorhexidine (PERIDEX) 0.12 % mouthwash 15 mL  15 mL Oral Q12H    PHENYLephrine (POLLO-SYNEPHRINE) 30 mg in 0.9% sodium chloride 250 mL infusion   mcg/min IntraVENous TITRATE    sodium chloride (NS) flush 5-40 mL  5-40 mL IntraVENous Q8H    sodium chloride (NS) flush 5-40 mL  5-40 mL IntraVENous PRN    ondansetron (ZOFRAN) injection 4 mg  4 mg IntraVENous Q4H PRN    nicotine (NICODERM CQ) 21 mg/24 hr patch 1 Patch  1 Patch TransDERmal DAILY    piperacillin-tazobactam (ZOSYN) 3.375 g in 0.9% sodium chloride (MBP/ADV) 100 mL  3.375 g IntraVENous Q8H    Vancomycin  Pharmacy to Dose   Other Rx Dosing/Monitoring     ______________________________________________________________________  EXPECTED LENGTH OF STAY: 4d 2h  ACTUAL LENGTH OF STAY:          [address] [facility]

## 2019-02-15 NOTE — PROGRESS NOTES
TRANSFER - OUT REPORT:    Verbal report given to 5602 Ismael Beckett RN(name) on Macie Lowery  being transferred to [facility] telemetry (unit) for routine progression of care       Report consisted of patients Situation, Background, Assessment and   Recommendations(SBAR). Information from the following report(s) SBAR, MAR, Recent Results and Cardiac Rhythm NSR/ST was reviewed with the receiving nurse. Lines:   Venous Access Device Bard PowerPort 12/20/18 Upper chest (subclavicular area, right (Active)   Central Line Being Utilized Yes 2/15/2019  4:00 PM   Criteria for Appropriate Use Irritant/vesicant medication 2/15/2019  4:00 PM   Site Assessment Clean, dry, & intact 2/15/2019  4:00 PM   Date of Last Dressing Change 02/15/19 2/15/2019  4:00 PM   Dressing Status Clean, dry, & intact 2/15/2019  4:00 PM   Dressing Type Disk with Chlorhexadine gluconate (CHG) 2/15/2019  4:00 PM   Action Taken Open ports on tubing capped 2/15/2019  4:00 PM   Date Accessed (Medial Site) 02/14/19 2/15/2019 12:00 AM   Access Time (Medial Site) 0820 2/14/2019  8:20 AM   Access Needle Size (Site #1) 20 G 2/14/2019  8:20 AM   Access Needle Length (Medial Site) 0.75 inches 2/14/2019  8:20 AM   Positive Blood Return (Medial Site) Yes 2/15/2019  4:00 PM   Action Taken (Medial Site) Blood drawn;Flushed 2/14/2019  8:20 AM       Peripheral IV Left Arm (Active)   Site Assessment Clean, dry, & intact 2/15/2019  4:00 PM   Phlebitis Assessment 0 2/15/2019  4:00 PM   Infiltration Assessment 0 2/15/2019  4:00 PM   Dressing Status Clean, dry, & intact 2/15/2019  4:00 PM   Dressing Type Transparent 2/15/2019  4:00 PM   Hub Color/Line Status Pink;Capped 2/15/2019  4:00 PM   Action Taken Open ports on tubing capped 2/15/2019  4:00 PM   Alcohol Cap Used Yes 2/15/2019  4:00 PM        Opportunity for questions and clarification was provided.       Patient transported with:   Monitor  Registered Nurse

## 2019-02-15 NOTE — TELEPHONE ENCOUNTER
Called patient to advise/confirm upcoming appt with Dr. Wendi Bethea on  2/19/19    at 10:30am at [facility]. No answer so left voicemail. Also advised to please bring in your Drivers License and Insurance Card with you to appointment as well as any prescription pain medication in the original container with you to appt.

## 2019-02-15 NOTE — PROGRESS NOTES
0730 - Bedside and Verbal shift change report given to Elizabeth Kay RN (oncoming nurse) by Carrie Rivero RN (offgoing nurse). Report included the following information SBAR, MAR, Recent Results and Cardiac Rhythm SR.    1100 -  called to update plans for patient - okay to wean sedation and do SBT, with ABG 30 mins after trial.  Sedation weaned for SBT. Patient is able to follow commands. 1140 - Respiratory therapy at bedside and patient extubated to room air. Patient is alert & oriented and moving all extremities. 1230 - Patient able to swallow without difficulty. Orders received for diet. Will continue to monitor patient and per Dr. Vilma Infante patient may transfer out to telemetry later today. 1600 - Verified with Dr. Vilma Infante that a telemetry bed on [employer] is okay for patient to transfer. 65 - Dr. Vilma Infante notified that patient is mildly anxious and discussed PTA medications - orders to follow.

## 2019-02-15 NOTE — PROGRESS NOTES
Pharmacist Note - Vancomycin Dosing    Consult provided for this 41 y.o. male for indication of sepsis. Antibiotic regimen(s): vancomycin and zosyn    Recent Labs     19  1310 19  0835   WBC 31.2* 12.0*   CREA 1.10 1.11   BUN 17 15     Frequency of BMP: daily  Height: 170.2 cm  Weight: 66.2 kg  Est CrCl: ~80 ml/min  Temp (24hrs), Av.4 °F (36.3 °C), Min:97 °F (36.1 °C), Max:97.6 °F (36.4 °C)    Cultures:  No current    Goal trough = 15 - 20 mcg/mL    Therapy will be initiated with a loading dose of 1500 mg IV x 1 to be followed by a maintenance dose of 1000 mg IV every 12 hours. Pharmacy to follow patient daily and order levels / make dose adjustments as appropriate.

## 2019-02-15 NOTE — INTERDISCIPLINARY ROUNDS
IDR/SLIDR Summary          Patient: Aquiles Krishna MRN: [id number]    Age: 41 y.o. YOB: 1975 Room/Bed: [address] [place] [address]   Admit Diagnosis: ACS (acute coronary syndrome) ([facility] [I24.9]  [facility]: [facility]   Goals: SBT, wean sharlene  Readmission: YES  Quality Measure: AMI  VTE Prophylaxis: Mechanical  Influenza Vaccine screening completed? YES  Pneumococcal Vaccine screening completed? YES  Mobility needs: Yes   Nutrition plan:Yes; NPO  Consults:P.T, O.T., Speech, Respiratory and Case Management    Financial concerns:Yes  Escalated to CM? YES  RRAT Score: 13  Interventions:H2H  Testing due for pt today? YES  LOS: 1 days Expected length of stay ?  days  Discharge plan: Home   PCP: None  Transportation needs: Yes    Days before discharge:two or more days before discharge   Discharge disposition: Home    Signed:     Freddy Cabrera RN  2/15/2019  4:59 AM

## 2019-02-15 NOTE — PROGRESS NOTES
[facility]  Pulmonary, Critical Care, and Sleep Medicine  Name: Vesta Rodrigues MRN: [id number]   : 1975 Hospital: [facility]    Date: 2/15/2019          IMPRESSION:   1. S/p cardiac arrest- ? Rhythm? 2. EKG changes ? ischemic  3. Respiratory Failure/vent  4. AMS/agitation post arrest- neg head CT  5. Lymphoma stage IV- follicular; retroperitoneal mass- dx 2018- on chemo  6. R chest port-a-cath  7. H/o anxiety chronic benzos? RECOMMENDATIONS:   · Cards f/u  · Vent support--> awaken to try wean if no plans repeat cards intervention  · Wean pressors  · Add HC ? relative hypoadrenal  · cx sputum  · ICU protocol care    critically ill 35\" cct       Radiology  ( personally reviewed) CXR opk   ABG No results for input(s): PHI, PO2I, PCO2I in the last 72 hours. Subjective/Interval History:       2/15  Events reviewed  Interventions in cath lab  95% proximal LAD PTCA  Still on John- BP labile w need for sedation propofol- very active  awaking to follow simple commands on sedation        . Tapan Mendoza -------------  Chart reviewed-  asked to see in consult vent management/resp failure  40 yo male w lymphoma awaiting to start chemo rx at [facility] infusion center. .. Acute CP--> unresponsive  CPR x ~ 1 minute/ CV  ? sz    CHEMO rx:  Obinutuzumab-CHOP. Obinutuzumab: 1000 mg weekly on days 1, 8, 15 for cycle 1, then 1000 mg on day 1 q21 days for cycles 2-6, then monotherapy 1000 mg every 21 days for cycle 7, 8 with Cyclophosphamide 750mg/m2, Doxorubicin 50mg/m2, Vincristine 1.4mg/m2 on day 1 and Prednisone 100mg on Days 1-5, every 21 days for a total of 6 cycles. S/p cardiac arrest   To [facility] intubated  To [facility] for ?  Code ice    Sedated/responsive-- wildly agitated per reports  No code ICE  To cath lab imminently    Patient Active Problem List   Diagnosis Code    HTN (hypertension) I10    Tobacco abuse Z72.0    Left sided numbness R20.0    Hyperlipidemia E78.5    Retroperitoneal mass R19.00    Lymphadenopathy U97.5    Follicular lymphoma ([facility]) C82.90    Anxiety F41.9    Pain, dental K08.89    Sepsis ([facility]) A41.9    Neutropenic fever ([facility]) D70.9, R50.81    ACS (acute coronary syndrome) ([facility]) I24.9    Cardiac arrest ([facility] [address] I46.9     Past Medical History:   Diagnosis Date    Anxiety     Hyperlipidemia     Hypertension     Lymphadenopathy 11/12/2018     ROS unobtainable    History reviewed. No pertinent surgical history. No Known Allergies     Prior to Admission Medications   Prescriptions Last Dose Informant Patient Reported? Taking? L. acidoph & paracasei- S therm- Bifido (AUSTIN-Q/RISAQUAD) 8 billion cell cap cap   No No   Sig: Take 1 Cap by mouth daily. LORazepam (ATIVAN) 0.5 mg tablet   No No   Sig: Take 1 Tab by mouth every eight (8) hours as needed. Max Daily Amount: 1.5 mg.   aspirin delayed-release (ASPIR-81) 81 mg tablet   No No   Sig: Take 1 Tab by mouth daily. atorvastatin (LIPITOR) 10 mg tablet   Yes No   Sig: Take 10 mg by mouth nightly. hydroCHLOROthiazide (HYDRODIURIL) 12.5 mg tablet   No No   Sig: Take 1 Tab by mouth daily. DO NOT TAKE for 5 DAYS. Do not take if dehydrated or not eating/drinking. Do not take if blood pressure is low (<596 systolic or <59 diastolic)   lisinopril (PRINIVIL, ZESTRIL) 10 mg tablet   No No   Sig: Take 1 Tab by mouth daily. DO NOT TAKE for 5 days   magic mouthwash solution   No No   Sig: Take 15-30 mL by mouth four (4) times daily. Magic mouth wash   Maalox  Lidocaine 2% viscous   Diphenhydramine oral solution    Swish and spit for mouth pain, ok to swallow for throat pain     Pharmacy to mix equal portions of ingredients to a total volume as indicated in the dispense amount. naloxone (NARCAN) 4 mg/actuation nasal spray   No No   Sig: Use 1 spray intranasally, then discard. Repeat with new spray every 2 min as needed for opioid overdose symptoms, alternating nostrils.    ondansetron hcl (ZOFRAN) 8 mg tablet   No No   Sig: Take 1 Tab by mouth every eight (8) hours as needed for Nausea. oxyCODONE IR (ROXICODONE) 5 mg immediate release tablet   No No   Sig: Take 1 Tab by mouth every six (6) hours as needed for Pain. Max Daily Amount: 20 mg.   predniSONE (DELTASONE) 20 mg tablet   No No   Sig: Take 100 mg by mouth daily (with breakfast). (on days 1-5 of each chemo cycle)   predniSONE (DELTASONE) 20 mg tablet   Yes No   Sig: Take 100 mg by mouth daily. Take on Days 1 through 5 each cycle. prochlorperazine (COMPAZINE) 10 mg tablet   No No   Sig: Take 1 Tab by mouth every six (6) hours as needed. senna-docusate (PERICOLACE) 8.6-50 mg per tablet   No No   Sig: Take 1 Tab by mouth daily.       Facility-Administered Medications: None       Social History     Socioeconomic History    Marital status: SINGLE     Spouse name: Not on file    Number of children: Not on file    Years of education: Not on file    Highest education level: Not on file   Social Needs    Financial resource strain: Not on file    Food insecurity - worry: Not on file    Food insecurity - inability: Not on file    Transportation needs - medical: Not on file   Nano needs - non-medical: Not on file   Occupational History    Not on file   Tobacco Use    Smoking status: Current Every Day Smoker     Packs/day: 1.00     Years: 20.00     Pack years: 20.00    Smokeless tobacco: Never Used   Substance and Sexual Activity    Alcohol use: No     Frequency: Never    Drug use: No    Sexual activity: Yes   Other Topics Concern    Not on file   Social History Narrative    Not on file     Family History   Problem Relation Age of Onset    Hypertension Father     Diabetes Mother          Objective:     Mode Rate Tidal Volume Pressure FiO2 PEEP   Assist control, Volume controlac  16 500 ml500    30 %30 5 cm H205     Vital Signs:    Ventilator Pressures  PIP Observed (cm H2O): 18 cm H2O  MAP (cm H2O): 9.3  PEEP/VENT (cm H2O): 5 cm E12TLOK(24)Ventilator Pressures  PIP Observed (cm H2O): 18 cm H2O  MAP (cm H2O): 9.3  PEEP/VENT (cm H2O): 5 cm H20  Safety & Alarms  Circuit Temperature: (HME)  Backup Mode Checked/Apnea: Yes  Pressure Max: 40 cm H2O  Ve Min: 2  Ve Max: 20  Vt Min: 200 ml  Vt Max: 900 ml  RR Max: 40  Intake/Output:   Last shift:      Ventilator Volumes  Vt Set (ml): 500 ml  Vt Exhaled (Machine Breath) (ml): 554 ml  Ve Observed (l/min): 8.4 l/min  Last 3 shifts: No intake/output data recorded. RRIOLAST3    Intake/Output Summary (Last 24 hours) at 2/15/2019 0855  Last data filed at 2/15/2019 0700  Gross per 24 hour   Intake 3587.21 ml   Output 2440 ml   Net 1147.21 ml     EXAM:     Pale thin male   Alopecia  moves all- follows commands  Orally intubated- scanmt clear secretions  No JVD  Chest clear ant  CV S1S2 RRR  Abd soft- decreased BS  No Cooley  LE no edema  R groin line  Skin intact pale  R chest port-a-cath      Data    I have personally reviewed data, flowsheets for the last 24 hours.         Labs:  Recent Labs     02/15/19  0215 02/14/19  1310 02/14/19  0835   WBC 17.2* 31.2* 12.0*   HGB 10.5* 12.3 13.2   HCT 32.2* 36.5* 38.6    252 266     Recent Labs     02/15/19  0215 02/14/19  1310 02/14/19  0835    134* 136   K 3.7 4.0 3.6    101 100   CO2 23 22 26   GLU 81 119* 128*   BUN 11 17 15   CREA 0.87 1.10 1.11   CA 8.3* 9.0 9.5   ALB 2.9* 3.3* 3.5   SGOT 21 28 13*   ALT 26 38 1842 Dimitri Covarrubias MD  [facility]

## 2019-02-15 NOTE — PROGRESS NOTES
Problem: Pressure Injury - Risk of  Goal: *Prevention of pressure injury  Document Asim Scale and appropriate interventions in the flowsheet. Outcome: Progressing Towards Goal  Pressure Injury Interventions:  Sensory Interventions: Maintain/enhance activity level         Activity Interventions: Pressure redistribution bed/mattress(bed type), Increase time out of bed    Mobility Interventions: HOB 30 degrees or less, Pressure redistribution bed/mattress (bed type)    Nutrition Interventions: Discuss nutritional consult with provider    Friction and Shear Interventions: HOB 30 degrees or less, Lift sheet               Problem: Falls - Risk of  Goal: *Absence of Falls  Document Perla Fall Risk and appropriate interventions in the flowsheet.   Outcome: Progressing Towards Goal  Fall Risk Interventions:  Mobility Interventions: PT Consult for mobility concerns         Medication Interventions: Evaluate medications/consider consulting pharmacy, Patient to call before getting OOB    Elimination Interventions: Call light in reach, Patient to call for help with toileting needs

## 2019-02-15 NOTE — PROGRESS NOTES
Problem: Pressure Injury - Risk of  Goal: *Prevention of pressure injury  Document Asim Scale and appropriate interventions in the flowsheet. Pressure Injury Interventions:  Sensory Interventions: Assess changes in LOC, Keep linens dry and wrinkle-free, Maintain/enhance activity level, Minimize linen layers, Pressure redistribution bed/mattress (bed type), Turn and reposition approx. every two hours (pillows and wedges if needed)         Activity Interventions: Pressure redistribution bed/mattress(bed type), PT/OT evaluation    Mobility Interventions: Pressure redistribution bed/mattress (bed type), Turn and reposition approx. every two hours(pillow and wedges)    Nutrition Interventions: Document food/fluid/supplement intake, Discuss nutritional consult with provider    Friction and Shear Interventions: HOB 30 degrees or less               Problem: Falls - Risk of  Goal: *Absence of Falls  Document Perla Fall Risk and appropriate interventions in the flowsheet.   Outcome: Resolved/Met Date Met: 02/15/19  Fall Risk Interventions:  Mobility Interventions: Patient to call before getting OOB, Communicate number of staff needed for ambulation/transfer, Strengthening exercises (ROM-active/passive)    Mentation Interventions: Adequate sleep, hydration, pain control, Bed/chair exit alarm, Door open when patient unattended, Eyeglasses and hearing aids, Increase mobility, More frequent rounding, Reorient patient, Room close to nurse's station, Update white board    Medication Interventions: Assess postural VS orthostatic hypotension, Patient to call before getting OOB    Elimination Interventions: Bed/chair exit alarm, Call light in reach, Patient to call for help with toileting needs, Toileting schedule/hourly rounds

## 2019-02-15 NOTE — PROGRESS NOTES
2/15/2019     Admit Date: 2/14/2019    Admit Diagnosis: ACS (acute coronary syndrome) [facility]) [I24.9]    Principal Problem:    Cardiac arrest [facility] (2/14/2019)    Active Problems:    ACS (acute coronary syndrome) ([facility] (2/14/2019)        Assessment/Plan:  1. Status post cardiac arrest with CAD: culprit lesion: string like 95-99% proximal LAD treated with TOM. There is a residual distal RCA lesion. 2. Extubated and neurologically intact   3. Being empirically treated for sepsis  Plan:  Transfer to telemetry  Increase BB       Subjective:  Seems depressed and has no recollection of yesterday's events     Vesta Rodrigues denies chest pain. Objective:     Visit Vitals  /68   Pulse (!) 105   Temp 100 °F (37.8 °C)   Resp 19   Ht 5' 7\" (1.702 m)   Wt 68.1 kg (150 lb 2.1 oz)   SpO2 98%   BMI 23.51 kg/m²        Physical Exam:  Neck: supple, symmetrical, trachea midline, no adenopathy, thyroid: not enlarged, symmetric, no tenderness/mass/nodules, no carotid bruit and no JVD  Heart: regular rate and rhythm, S1, S2 normal  Lungs: clear to auscultation bilaterally, normal percussion bilaterally  Abdomen: soft, non-tender. Bowel sounds normal. No masses,  no organomegaly  Extremities: extremities normal, atraumatic, no cyanosis or edema  Pulses: 2+ and symmetric  Neurologic: Grossly normal      Labs:    Recent Labs     02/14/19  1310   TROIQ 0.06*     Recent Labs     02/15/19  0215      K 3.7      BUN 11   CREA 0.87   GLU 81   CA 8.3*     Recent Labs     02/15/19  0215   WBC 17.2*   HGB 10.5*   HCT 32.2*        No results for input(s): CHOL, LDLC in the last 72 hours.     No lab exists for component: TGL, HDLC,  HBA1C    Telemetry: sinus tachycardia     Data Review: current meds, labs,recent radiology, intake/output/weight and problem list reviewed

## 2019-02-15 NOTE — CONSULTS
[address]  Medical Oncology at [facility]    Hematology / Oncology Consut    Reason for Consult:   Archana Thompson is a 41 y.o. male who is currently admitted after cardiac arrest    Hematology Oncology Treatment History:     Diagnosis: Follicular lymphoma    Stage: IV    Pathology:   11/13/18 right inguinal LN excision: Follicular lymphoma, high-grade (grade 3a of 3). Comment   The delaney architecture is entirely effaced by atypical lymphocytic proliferation with prominent nodular growth pattern. The majority of the atypical lymphocytes are small to medium in size and have irregular nuclear outlines and inconspicuous nucleoli, morphologically consistent with centrocytes. Scattered large lymphocytes with prominent nucleoli, consistent with centroblasts are identified (> 15 per high-power field). Focal increase in mitotic figures is noted. Occasional follicular dendritic cells are seen. By immunohistochemistry, the atypical lymphocytes are positive for CD20, PAX5, CD10, BCL6 and BCL2 (weak, focal) and negative for MUM1. CD3, CD5 and CD43 stain numerous background T lymphocytes. Ki-67 reveals a high proliferation index in the neoplastic follicles (overall 71-60%). Clinical history indicates 14 cm retroperitoneal mass with bilateral inguinal lymphadenopathy. In summary, the combined morphologic and phenotypic findings are diagnostic of a high-grade follicular lymphoma (grade 3a of 3). There is no evidence of diffuse large B-cell lymphoma. Flow cytometry analysis:   Monoclonal B-cell population (47 % of all cells) with mild increase in side and forward scatter properties expressing CD19, CD20, CD23 and CD10 with surface lambda light chain restriction. No phenotypically aberrant T-cell population. Flow cytometry was performed at [facility]     Prior Treatment: None    Current Treatment: Obinutuzumab-CHOP.  Obinutuzumab: 1000 mg weekly on days 1, 8, 15 for cycle 1, then 1000 mg on day 1 q21 days for cycles 2-6, then monotherapy 1000 mg every 21 days for cycle 7, 8 with Cyclophosphamide 750mg/m2, Doxorubicin 50mg/m2, Vincristine 1.4mg/m2 on day 1 and Prednisone 100mg on Days 1-5, every 21 days for a total of 6 cycles. History of Present Illness:   Mr. Zapata is a 42 y/o male with HTN and stage IV follicular lymphoma who is currently admitted after a cardiac arrest in outpatient infusion center at [facility]. Patient was in infusion center for cycle 3 day 1 of O-CHOP. Patient received pre-medications, tylenol and benadryl in [facility], then developed chest pain and vomited once. Subsequently, he collapsed onto the floor, had seizure like activity and no pulse, so CPR given for one minute and the patient was shocked with AED. He regained a pulse, but remained unconscious, EMS arrived around 11 am and safely transported the patient to the ED. He was then transferred to [facility] for ICE protocol. ECHO on 12/17/18 showed EF 65%. Cardiac cath done at [facility] on 2/14 showed severe string like stenosis (95%) of the proximal LAD. Now status post PTCA and stenting of the proximal LAD. Repeat ECHO done yesterday 2/14 shows EF decreased to 41-45%. He has not been extubated and appears neurologically intact. He has no shortness of breath or chest pain at time of visit. He does not remember any events from yesterday and unclear when the chest pain started. Oncology History:  He states he was in his usual state of health in early November 2018, but then he developed low back pain and presented to the ER. The pain was described as constant, radiating to the buttocks, without exacerbating or alleviating factors, associated w/ increased urination, no n/v/d, no dysuria, no fever, he's unsure about weight loss. Work-up at outside hospital revealed a retroperitoneal mass seen on CT imaging, and he was transferred to [facility] for further work-up.  CT there showed a large retroperitoneal mass encircling the aorta with invasion of the left renal hilum and left adrenal gland. There were bilateral inguinal lymph nodes and moderate left hydronephrosis. He was evaluated while at [facility] and was noted to have palpable nodes in his groin for approximately the past 1 month. No testicular mass, anorexia, weight loss, fevers, night sweats, chest pain, shortness of breath, abdominal pain or nausea. He underwent excisional LN biopsy of right inguinal LN. He was told that he had likely lymphoma. He was advised to follow up as outpatient for PET scan, bone marrow biopsy. However, patient states he was lost to f/u. He never received any calls or appointment details. His aunt urged patient to seek care elsewhere and his PCP recommended [facility]. At our first meeting on 12/13/18, he tells me that he was working full time, feeling well until early Nov 2018. When he developed the left lower back pain, he went to [facility]. No fevers, chills, night sweats. He has lost 15 lbs in the past month due to low appetite. No n/v/d. No current constipation. No CP, SOB. No h/o cardiac disease aside from HTN, Hyperlipiemia. No hematochezia/melena, hematuria. He has HTN and XOL, which he was taking meds for, but has run out. Has no PCP currently. He is uninsured. He works as a cook, currently working part time. He has children aged 12 and 13. FAMILY HISTORY:  His father has a history of leukemia, currently in remission, treated at [facility]. LYMPHATICS:  Bilateral palpable inguinal adenopathy. Past Medical History:   Diagnosis Date    Anxiety     Hyperlipidemia     Hypertension     Lymphadenopathy 11/12/2018      History reviewed. No pertinent surgical history.    Social History     Tobacco Use    Smoking status: Current Every Day Smoker     Packs/day: 1.00     Years: 20.00     Pack years: 20.00    Smokeless tobacco: Never Used   Substance Use Topics    Alcohol use: No     Frequency: Never      Family History   Problem Relation Age of Onset    Hypertension Father     Diabetes Mother      Current Facility-Administered Medications   Medication Dose Route Frequency    0.9% sodium chloride infusion  5 mL/hr IntraVENous CONTINUOUS    0.9% sodium chloride infusion  5 mL/hr IntraVENous CONTINUOUS    vancomycin (VANCOCIN) 1250 mg in  ml infusion  1,250 mg IntraVENous Q12H    hydrocortisone Sod Succ (PF) (SOLU-CORTEF) injection 100 mg  100 mg IntraVENous Q8H    propofol (DIPRIVAN) infusion  0-50 mcg/kg/min IntraVENous TITRATE    fentaNYL (PF) 1,500 mcg/30 mL (50 mcg/mL) infusion  0-200 mcg/hr IntraVENous TITRATE    albuterol-ipratropium (DUO-NEB) 2.5 MG-0.5 MG/3 ML  3 mL Nebulization Q6H RT    famotidine (PF) (PEPCID) 20 mg in sodium chloride 0.9% 10 mL injection  20 mg IntraVENous Q12H    clopidogrel (PLAVIX) tablet 75 mg  75 mg Per NG tube DAILY    aspirin (ASA) suppository 300 mg  300 mg Rectal DAILY    chlorhexidine (PERIDEX) 0.12 % mouthwash 15 mL  15 mL Oral Q12H    PHENYLephrine (POLLO-SYNEPHRINE) 30 mg in 0.9% sodium chloride 250 mL infusion   mcg/min IntraVENous TITRATE    sodium chloride (NS) flush 5-40 mL  5-40 mL IntraVENous Q8H    sodium chloride (NS) flush 5-40 mL  5-40 mL IntraVENous PRN    ondansetron (ZOFRAN) injection 4 mg  4 mg IntraVENous Q4H PRN    nicotine (NICODERM CQ) 21 mg/24 hr patch 1 Patch  1 Patch TransDERmal DAILY    piperacillin-tazobactam (ZOSYN) 3.375 g in 0.9% sodium chloride (MBP/ADV) 100 mL  3.375 g IntraVENous Q8H    Vancomycin  Pharmacy to Dose   Other Rx Dosing/Monitoring      No Known Allergies     Review of Systems: A complete review of systems was obtained, negative except as described above.     Physical Exam:     Visit Vitals  BP 97/70   Pulse (!) 110   Temp 100 °F (37.8 °C)   Resp 20   Ht 5' 7\" (1.702 m)   Wt 150 lb 2.1 oz (68.1 kg)   SpO2 97%   BMI 23.51 kg/m²     ECOG PS: 1  General: Well developed, no acute distress  Eyes: PERRLA, EOMI, anicteric sclerae  HENT: Atraumatic  Neck: Supple  Lymphatic: No supraclavicular, axillary adenopathy. R cervical 2cm LN absent. Bilateral small 2-3cm palpable inguinal adenopathy  Respiratory: CTAB, normal respiratory effort, no longer on supplemental oxygen  CV: Normal rate, regular rhythm, no murmurs, no peripheral edema, on telemetry   GI: Soft, nontender, nondistended, no masses, no hepatomegaly, no splenomegaly  MS: Moves all 4 extremities  Skin: No rashes, ecchymoses, or petechiae. Normal temperature, turgor, and texture. Neuro/Psych: Alert, oriented, unclear of yesterday's events. 5/5 strength in all 4 extremities. Flat affect. Results:     Lab Results   Component Value Date/Time    WBC 17.2 (H) 02/15/2019 02:15 AM    HGB 10.5 (L) 02/15/2019 02:15 AM    HCT 32.2 (L) 02/15/2019 02:15 AM    PLATELET 745 39/07/4989 02:15 AM    MCV 91.2 02/15/2019 02:15 AM    ABS. NEUTROPHILS 29.4 (H) 02/14/2019 01:10 PM    Hemoglobin (POC) 15.0 06/05/2009 02:13 PM    Hematocrit (POC) 39 02/14/2019 01:24 PM     Lab Results   Component Value Date/Time    Sodium 137 02/15/2019 02:15 AM    Potassium 3.7 02/15/2019 02:15 AM    Chloride 107 02/15/2019 02:15 AM    CO2 23 02/15/2019 02:15 AM    Glucose 81 02/15/2019 02:15 AM    BUN 11 02/15/2019 02:15 AM    Creatinine 0.87 02/15/2019 02:15 AM    GFR est AA >60 02/15/2019 02:15 AM    GFR est non-AA >60 02/15/2019 02:15 AM    Calcium 8.3 (L) 02/15/2019 02:15 AM    Sodium (POC) 136 02/14/2019 01:24 PM    Potassium (POC) 3.9 02/14/2019 01:24 PM    Chloride (POC) 102 02/14/2019 01:24 PM    Glucose (POC) 121 (H) 02/14/2019 01:24 PM    BUN (POC) 14 02/14/2019 01:24 PM    Creatinine (POC) 0.9 02/14/2019 01:24 PM    Calcium, ionized (POC) 1.24 02/14/2019 01:24 PM     Lab Results   Component Value Date/Time    Bilirubin, total 0.2 02/15/2019 02:15 AM    ALT (SGPT) 26 02/15/2019 02:15 AM    AST (SGOT) 21 02/15/2019 02:15 AM    Alk.  phosphatase 90 02/15/2019 02:15 AM    Protein, total 5.5 (L) 02/15/2019 02:15 AM Albumin 2.9 (L) 02/15/2019 02:15 AM    Globulin 2.6 02/15/2019 02:15 AM     No results found for: IRON, FE, TIBC, IBCT, PSAT, FERR    No results found for: B12LT, FOL, RBCF  Lab Results   Component Value Date/Time    TSH 1.53 2016 04:40 AM     18:     Lab Results   Component Value Date/Time    Hepatitis A, IgM NONREACTIVE 2018 04:53 PM    Hepatitis B surface Ag <0.10 2018 04:53 PM    Hepatitis B core, IgM NONREACTIVE 2018 04:53 PM         Imagin/9/18 Abd/pelvis CT: IMPRESSION:  1. Interval development of a large retroperitoneal mass encircling the aorta with invasion of the left renal hilum and left adrenal gland. Several adjacent lymph nodes are seen extending into the peritoneum and underneath the  diaphragmatic natalie. This most likely represents lymphoma. 2. Several new bilateral enlarged inguinal lymph nodes also likely representing lymphoma. 3. Moderate left hydronephrosis with a delayed renal nephrogram related to decreased renal function. This is related to the invasion of the renal hilum. 18 Chest CT: IMPRESSION:  Trace left pleural effusion. Bilateral lower lobe atelectasis. Large  retroperitoneal mass lesion again demonstrated. PET 18:  FINDINGS:  HEAD/NECK: Right palatine tonsil intense hypermetabolism, max SUV 18. Multilevel  bilateral cervical adenopathy, with max SUV 12 in a left supraclavicular node. Cerebral evaluation is limited by normal intense activity. CHEST: Solitary hypermetabolic left axillary node, max SUV 11. ABDOMEN/PELVIS: Bulky retroperitoneal mass max SUV 27, with several additional  small active abdomino-pelvic nodes. Bilateral inguinal nodes with max SUV 12 on  the left. SKELETON: No foci of abnormal hypermetabolism in the axial and visualized  appendicular skeleton. IMPRESSION:   1. Right palatine tonsil tumor involvement (Deauville 5). 2. Bilateral cervical delaney involvement (Deauville 5).   3. Left axillary node involvement (Deauville 5). 4. Bulky retroperitoneal lymphoma mass and additional smaller hypermetabolic  abdomino-pelvic nodes (Deauville 5). 5. Bilateral inguinal delaney involvement (Deauville 5). Deauville Five Point Scale  1. No uptake or no residual uptake (when used interim)  2. Slight uptake, but below blood pool (mediastinum)  3. Uptake above mediastinal, but below/equal to uptake in the liver  4. Uptake slightly to moderately higher than liver  5. Markedly increased uptake or any new lesion (on response evaluation)  Each FDG-avid (or previously FDG avid) lesion is rated independently. Reference values:  Mediastinal blood pool: 2.1 SUV  Liver (background): 2.2 SUV    PET/CT 2/05/19:   IMPRESSION:  1. No Foci of Abnormal Hypermetabolism (Deauville 1). 2. Resolved activity in the right palatine tonsil, bilateral cervical nodes,left axillary node, retroperitoneal/abdominal pelvic adenopathy, bilateral inguinal nodes. Assessment & Plan:   Georgina Izquierdo is a 41 y.o. male comes in for evaluation and management of lymphoma. 1. Cardiac arrest: on 2/14 in infusion center prior to chemotherapy. He has received 2 cycles of Adriamycin. Pre treatment ECHO showed EF of 65% and ECHO yesterday shows EF of 41-45%. Cardiac cath showed severe string like stenosis (95%) of the proximal LAD. Acute anthracycline induced HF is not common ~ 3%  He was found to have a culprit lesion on cath and hence I do not think his cardiac arrest is due to anthracycles  Certainly should not receive any additional anthracyclines. 2. Follicular lymphoma: Grade 3a. On O-CHOP per Dr. Johanna Gerber. Will not receive any additional anthracycles. Obinituzumab and bendamustine would be an option     3. Use of cardiotoxic chemotherapy. Echo on 12/17/18 showed EF 65%. Repeat yesterday shows 41-45%  --Now status post cardiac arrest, see below  --Cardiology following    4. Neoplasm related pain / Anxiety: Left lower back pain.  Taking Oxycodone 5mg bid prn OP.   --Pain management per primary team    5. HTN / Hyperlipidemia  -- On Lisinopril, HCTZ, Lipitor OP      Will continue to follow while hospitalized. I appreciate the opportunity to participate in  Jennifer Feng raulito.     Signed By: Miriam Zayas MD     February 15, 2019

## 2019-02-16 LAB
ANION GAP SERPL CALC-SCNC: 6 MMOL/L (ref 5–15)
BASOPHILS # BLD: 0.1 K/UL (ref 0–0.1)
BASOPHILS NFR BLD: 1 % (ref 0–1)
BUN SERPL-MCNC: 10 MG/DL (ref 6–20)
BUN/CREAT SERPL: 11 (ref 12–20)
CALCIUM SERPL-MCNC: 8.2 MG/DL (ref 8.5–10.1)
CHLORIDE SERPL-SCNC: 110 MMOL/L (ref 97–108)
CO2 SERPL-SCNC: 25 MMOL/L (ref 21–32)
CREAT SERPL-MCNC: 0.93 MG/DL (ref 0.7–1.3)
DIFFERENTIAL METHOD BLD: ABNORMAL
EOSINOPHIL # BLD: 0 K/UL (ref 0–0.4)
EOSINOPHIL NFR BLD: 0 % (ref 0–7)
ERYTHROCYTE [DISTWIDTH] IN BLOOD BY AUTOMATED COUNT: 15.9 % (ref 11.5–14.5)
GLUCOSE SERPL-MCNC: 109 MG/DL (ref 65–100)
HCT VFR BLD AUTO: 26.4 % (ref 36.6–50.3)
HGB BLD-MCNC: 8.7 G/DL (ref 12.1–17)
IMM GRANULOCYTES # BLD AUTO: 0 K/UL (ref 0–0.04)
IMM GRANULOCYTES NFR BLD AUTO: 0 % (ref 0–0.5)
LYMPHOCYTES # BLD: 1.2 K/UL (ref 0.8–3.5)
LYMPHOCYTES NFR BLD: 13 % (ref 12–49)
MCH RBC QN AUTO: 30.1 PG (ref 26–34)
MCHC RBC AUTO-ENTMCNC: 33 G/DL (ref 30–36.5)
MCV RBC AUTO: 91.3 FL (ref 80–99)
MONOCYTES # BLD: 0.9 K/UL (ref 0–1)
MONOCYTES NFR BLD: 10 % (ref 5–13)
NEUTS SEG # BLD: 7.3 K/UL (ref 1.8–8)
NEUTS SEG NFR BLD: 76 % (ref 32–75)
NRBC # BLD: 0 K/UL (ref 0–0.01)
NRBC BLD-RTO: 0 PER 100 WBC
PLATELET # BLD AUTO: 186 K/UL (ref 150–400)
PMV BLD AUTO: 11.1 FL (ref 8.9–12.9)
POTASSIUM SERPL-SCNC: 3.5 MMOL/L (ref 3.5–5.1)
RBC # BLD AUTO: 2.89 M/UL (ref 4.1–5.7)
SODIUM SERPL-SCNC: 141 MMOL/L (ref 136–145)
WBC # BLD AUTO: 9.6 K/UL (ref 4.1–11.1)

## 2019-02-16 PROCEDURE — 74011250637 HC RX REV CODE- 250/637: Performed by: INTERNAL MEDICINE

## 2019-02-16 PROCEDURE — 65660000000 HC RM CCU STEPDOWN

## 2019-02-16 PROCEDURE — 74011250636 HC RX REV CODE- 250/636: Performed by: INTERNAL MEDICINE

## 2019-02-16 PROCEDURE — 94640 AIRWAY INHALATION TREATMENT: CPT

## 2019-02-16 PROCEDURE — 80048 BASIC METABOLIC PNL TOTAL CA: CPT

## 2019-02-16 PROCEDURE — 36415 COLL VENOUS BLD VENIPUNCTURE: CPT

## 2019-02-16 PROCEDURE — 74011000258 HC RX REV CODE- 258: Performed by: INTERNAL MEDICINE

## 2019-02-16 PROCEDURE — 85025 COMPLETE CBC W/AUTO DIFF WBC: CPT

## 2019-02-16 PROCEDURE — 74011000250 HC RX REV CODE- 250: Performed by: INTERNAL MEDICINE

## 2019-02-16 RX ORDER — ASPIRIN 81 MG/1
81 TABLET ORAL
Status: DISCONTINUED | OUTPATIENT
Start: 2019-02-17 | End: 2019-02-17 | Stop reason: HOSPADM

## 2019-02-16 RX ORDER — ASPIRIN 325 MG
325 TABLET ORAL DAILY
Status: DISCONTINUED | OUTPATIENT
Start: 2019-02-16 | End: 2019-02-16

## 2019-02-16 RX ORDER — METOPROLOL TARTRATE 25 MG/1
12.5 TABLET, FILM COATED ORAL 2 TIMES DAILY
Status: DISCONTINUED | OUTPATIENT
Start: 2019-02-16 | End: 2019-02-17

## 2019-02-16 RX ORDER — HYDROCORTISONE SODIUM SUCCINATE 100 MG/2ML
50 INJECTION, POWDER, FOR SOLUTION INTRAMUSCULAR; INTRAVENOUS EVERY 12 HOURS
Status: DISCONTINUED | OUTPATIENT
Start: 2019-02-16 | End: 2019-02-17

## 2019-02-16 RX ORDER — HYDROCORTISONE SODIUM SUCCINATE 100 MG/2ML
50 INJECTION, POWDER, FOR SOLUTION INTRAMUSCULAR; INTRAVENOUS EVERY 8 HOURS
Status: DISCONTINUED | OUTPATIENT
Start: 2019-02-16 | End: 2019-02-16

## 2019-02-16 RX ORDER — IPRATROPIUM BROMIDE AND ALBUTEROL SULFATE 2.5; .5 MG/3ML; MG/3ML
3 SOLUTION RESPIRATORY (INHALATION)
Status: DISCONTINUED | OUTPATIENT
Start: 2019-02-16 | End: 2019-02-17 | Stop reason: HOSPADM

## 2019-02-16 RX ADMIN — ASPIRIN 325 MG: 325 TABLET ORAL at 11:51

## 2019-02-16 RX ADMIN — PIPERACILLIN SODIUM,TAZOBACTAM SODIUM 3.38 G: 3; .375 INJECTION, POWDER, FOR SOLUTION INTRAVENOUS at 06:17

## 2019-02-16 RX ADMIN — IPRATROPIUM BROMIDE AND ALBUTEROL SULFATE 3 ML: .5; 3 SOLUTION RESPIRATORY (INHALATION) at 01:33

## 2019-02-16 RX ADMIN — METOPROLOL TARTRATE 12.5 MG: 25 TABLET ORAL at 18:51

## 2019-02-16 RX ADMIN — IPRATROPIUM BROMIDE AND ALBUTEROL SULFATE 3 ML: .5; 3 SOLUTION RESPIRATORY (INHALATION) at 20:18

## 2019-02-16 RX ADMIN — CHLORHEXIDINE GLUCONATE 15 ML: 1.2 RINSE ORAL at 08:52

## 2019-02-16 RX ADMIN — Medication 10 ML: at 06:00

## 2019-02-16 RX ADMIN — HYDROCORTISONE SODIUM SUCCINATE 50 MG: 100 INJECTION, POWDER, FOR SOLUTION INTRAMUSCULAR; INTRAVENOUS at 14:37

## 2019-02-16 RX ADMIN — Medication 10 ML: at 22:00

## 2019-02-16 RX ADMIN — FAMOTIDINE 20 MG: 10 INJECTION, SOLUTION INTRAVENOUS at 08:52

## 2019-02-16 RX ADMIN — OXYCODONE HYDROCHLORIDE 5 MG: 5 TABLET ORAL at 14:37

## 2019-02-16 RX ADMIN — HYDROCORTISONE SODIUM SUCCINATE 50 MG: 100 INJECTION, POWDER, FOR SOLUTION INTRAMUSCULAR; INTRAVENOUS at 22:09

## 2019-02-16 RX ADMIN — IPRATROPIUM BROMIDE AND ALBUTEROL SULFATE 3 ML: .5; 3 SOLUTION RESPIRATORY (INHALATION) at 08:11

## 2019-02-16 RX ADMIN — CHLORHEXIDINE GLUCONATE 15 ML: 1.2 RINSE ORAL at 22:09

## 2019-02-16 RX ADMIN — FAMOTIDINE 20 MG: 10 INJECTION, SOLUTION INTRAVENOUS at 22:09

## 2019-02-16 RX ADMIN — CLOPIDOGREL BISULFATE 75 MG: 75 TABLET, FILM COATED ORAL at 08:52

## 2019-02-16 RX ADMIN — Medication 10 ML: at 14:00

## 2019-02-16 RX ADMIN — HYDROCORTISONE SODIUM SUCCINATE 100 MG: 100 INJECTION, POWDER, FOR SOLUTION INTRAMUSCULAR; INTRAVENOUS at 06:17

## 2019-02-16 NOTE — PROGRESS NOTES
Reason for Admission:   Acute coronary syndrome                   RRAT Score:   10                  Plan for utilizing home health:    TBD                      Likelihood of Readmission:  moderate                         Transition of Care Plan:   Pt lives with his father, Laine Carballo ([phone]), in a one story house with 4 entry steps. Pt is independent with his ADL's and uses no assistive devices. Pt has applied for both Medicaid and Disability. Pt has no PCP but sees Dr. Shiloh Elliott for his Lymphoma. Pt gets his prescriptions filled at his local [facility] on [address]. [employer] is following for financial assistance (SSD and Medicaid). CM Specialist will assist in finding a PCP. CM will follow for any other discharge needs that may arise. Care Management Interventions  PCP Verified by CM: Yes  Mode of Transport at Discharge:  Other (see comment)(private vehicle)  Discharge Durable Medical Equipment: No  Physical Therapy Consult: No  Occupational Therapy Consult: No  Speech Therapy Consult: No  Current Support Network: Relative's Home(lives with father, indpendent with ADLs)  Confirm Follow Up Transport: Family  Plan discussed with Pt/Family/Caregiver: Yes  Freedom of Choice Offered: Yes  Agenda Resource Information Provided?: No  Discharge Location  Discharge Placement: Home

## 2019-02-16 NOTE — PROGRESS NOTES
0800- assumed care of pt. Pt sleeping comfortably    1025- paged Dr. Marce Luis to discuss changing  suppository to PO as pt was extubated yesterday after med was given    [phone]- spoke with Dr. Marce Luis. Telephone order to d/c ASA suppository and change to PO.

## 2019-02-16 NOTE — PROGRESS NOTES
Hospitalist Progress Note  DO Sarah Wheeler service: [phone] OR [phone] from in house phone        Date of Service:  2019  NAME:  Georgina Izquierdo  :  1975  MRN:  [id number]      Admission Summary:   41year old male with past medical history follicular lymphoma stage IV, presenting to [facility] as transfer post-cardiac arrest.     Interval history / Subjective:   Patient seen and examined. Has chest soreness. Discussed hospitalization. Will wean hydrocortisone. Discontinue antibiotics. BB started per cardiology. Assessment & Plan:     Cardiac arrest:  -outside hospital cardiac arrest prior to receiving chemotherapy  -reported AED shock with CPR x 1 minute. ROSC.  Intubated  -EKG with anterior T-wave inversion  -Code ICE deferred, patient able to move extremities   -echo with EF 46%, grade I diastolic dysfunction  -BB per cardiology     NSTEMI with CAD:  -s/p LHC with PCI to LAD  -continue aspirin, plavix    Acute respiratory failure:  -following cardiac arrest   -extubated     Follicular lymphoma, Stage IV:  -Currently undergoing O-CHOP  -oncology following    Shock: unclear etiology, resolved  -patient previously required low dose pressors  -wean steroids, discontinue antibiotics  -on intermittent steroids with chemotherapy    Leukocytosis:   -suspect reactive and secondary to events above   -no infection identified, discontinue antibiotics     Anemia, normocytic: continue to monitor, no evidence of bleeding    History of hypertension: hold home medications  Anxiety: home benzo  Chronic pain: home oxycodone      Code status: full  DVT prophylaxis: [address] discussed with: Nurse  Disposition: TBD     Hospital Problems  Date Reviewed: 2019          Codes Class Noted POA    ACS (acute coronary syndrome) ([facility] [address] ICD-10-CM: I24.9  ICD-9-CM: 411.1  2019 Unknown        * (Principal) Cardiac arrest ([facility] [address] ICD-10-CM: I46.9  ICD-9-CM: 427.5  2/14/2019 Yes                Review of Systems:   Unable to obtain      Vital Signs:    Last 24hrs VS reviewed since prior progress note. Most recent are:  Visit Vitals  /66 (BP 1 Location: Right arm, BP Patient Position: At rest)   Pulse (!) 108   Temp 98.5 °F (36.9 °C)   Resp 16   Ht 5' 7\" (1.702 m)   Wt 65 kg (143 lb 6.4 oz)   SpO2 97%   BMI 22.46 kg/m²         Intake/Output Summary (Last 24 hours) at 2/16/2019 1529  Last data filed at 2/15/2019 1800  Gross per 24 hour   Intake 10 ml   Output 350 ml   Net -340 ml        Physical Examination:             Constitutional:  no distress, resting    ENT:  Oral mucous moist, oropharynx benign. Neck supple,    Resp:  CTA bilaterally. No wheezing/rhonchi/rales. No accessory muscle use   CV:  Regular rhythm, normal rate, no murmurs, gallops, rubs    GI:  Soft, non distended, non tender. normoactive bowel sounds, no hepatosplenomegaly     Musculoskeletal:  No edema, warm, 2+ pulses throughout    Neurologic:  no focal deficits            Data Review:    Review and/or order of clinical lab test      Labs:     Recent Labs     02/16/19  0504 02/15/19  0215   WBC 9.6 17.2*   HGB 8.7* 10.5*   HCT 26.4* 32.2*    251     Recent Labs     02/16/19  0504 02/15/19  0215 02/14/19  1310    137 134*   K 3.5 3.7 4.0   * 107 101   CO2 25 23 22   BUN 10 11 17   CREA 0.93 0.87 1.10   * 81 119*   CA 8.2* 8.3* 9.0     Recent Labs     02/15/19  0215 02/14/19  2101 02/14/19  1310 02/14/19  0835   SGOT 21  --  28 13*   ALT 26  --  38 22   AP 90  --  100 109   TBILI 0.2  --  0.1* 0.1*   TP 5.5*  --  6.6 7.1   ALB 2.9*  --  3.3* 3.5   GLOB 2.6  --  3.3 3.6   AML  --  88  --   --    LPSE  --  78  --   --      No results for input(s): INR, PTP, APTT in the last 72 hours. No lab exists for component: INREXT, INREXT   No results for input(s): FE, TIBC, PSAT, FERR in the last 72 hours.    No results found for: FOL, RBCF   No results for input(s): PH, PCO2, PO2 in the last 72 hours.   Recent Labs     02/14/19  1310   TROIQ 0.06*     Lab Results   Component Value Date/Time    Cholesterol, total 170 09/28/2016 04:40 AM    HDL Cholesterol 23 09/28/2016 04:40 AM    LDL, calculated 96.4 09/28/2016 04:40 AM    Triglyceride 253 (H) 09/28/2016 04:40 AM    CHOL/HDL Ratio 7.4 (H) 09/28/2016 04:40 AM     Lab Results   Component Value Date/Time    Glucose (POC) 249 (H) 02/15/2019 10:21 PM    Glucose (POC) 121 (H) 02/14/2019 01:24 PM    Glucose (POC) 98 06/05/2009 02:13 PM     Lab Results   Component Value Date/Time    Color YELLOW/STRAW 02/15/2019 02:15 AM    Appearance CLEAR 02/15/2019 02:15 AM    Specific gravity 1.010 02/15/2019 02:15 AM    Specific gravity 1.012 01/09/2019 03:48 PM    pH (UA) 5.5 02/15/2019 02:15 AM    Protein NEGATIVE  02/15/2019 02:15 AM    Glucose NEGATIVE  02/15/2019 02:15 AM    Ketone NEGATIVE  02/15/2019 02:15 AM    Bilirubin NEGATIVE  02/15/2019 02:15 AM    Urobilinogen 0.2 02/15/2019 02:15 AM    Nitrites NEGATIVE  02/15/2019 02:15 AM    Leukocyte Esterase NEGATIVE  02/15/2019 02:15 AM    Epithelial cells FEW 02/15/2019 02:15 AM    Bacteria NEGATIVE  02/15/2019 02:15 AM    WBC 0-4 02/15/2019 02:15 AM    RBC 0-5 02/15/2019 02:15 AM         Medications Reviewed:     Current Facility-Administered Medications   Medication Dose Route Frequency    metoprolol tartrate (LOPRESSOR) tablet 12.5 mg  12.5 mg Oral BID    [START ON 2/17/2019] aspirin delayed-release tablet 81 mg  81 mg Oral DAILY AFTER BREAKFAST    hydrocortisone Sod Succ (PF) (SOLU-CORTEF) injection 50 mg  50 mg IntraVENous Q12H    0.9% sodium chloride infusion  5 mL/hr IntraVENous CONTINUOUS    0.9% sodium chloride infusion  5 mL/hr IntraVENous CONTINUOUS    LORazepam (ATIVAN) tablet 0.5 mg  0.5 mg Oral Q6H PRN    oxyCODONE IR (ROXICODONE) tablet 5 mg  5 mg Oral Q6H PRN    albuterol-ipratropium (DUO-NEB) 2.5 MG-0.5 MG/3 ML  3 mL Nebulization Q6H RT    famotidine (PF) (PEPCID) 20 mg in sodium chloride 0.9% 10 mL injection  20 mg IntraVENous Q12H    clopidogrel (PLAVIX) tablet 75 mg  75 mg Per NG tube DAILY    chlorhexidine (PERIDEX) 0.12 % mouthwash 15 mL  15 mL Oral Q12H    sodium chloride (NS) flush 5-40 mL  5-40 mL IntraVENous Q8H    sodium chloride (NS) flush 5-40 mL  5-40 mL IntraVENous PRN    ondansetron (ZOFRAN) injection 4 mg  4 mg IntraVENous Q4H PRN    nicotine (NICODERM CQ) 21 mg/24 hr patch 1 Patch  1 Patch TransDERmal DAILY     ______________________________________________________________________  EXPECTED LENGTH OF STAY: 4d 2h  ACTUAL LENGTH OF STAY:          [address],

## 2019-02-16 NOTE — PROGRESS NOTES
Patient is resting in bed family at the bedside. No complaints of chest pain or left or right groin pain. He does complain of chest soreness from being shocked and CPR with some bruising to the chest no other complaints at this time. Will continue to monitor patient for changes in his condition. 2320 pain medication given to patient for chest soreness he denies any other pain at this time. 0500 patient is resting in bed no complaints at this time blood drawn and sent to lab    0730 Bedside and Verbal shift change report given to Chacho Varma RN  (oncoming nurse) by Eder Roy RN  (offgoing nurse). Report included the following information SBAR, MAR, Recent Results and Med Rec Status.

## 2019-02-16 NOTE — PROGRESS NOTES
Problem: Pressure Injury - Risk of  Goal: *Prevention of pressure injury  Document Asim Scale and appropriate interventions in the flowsheet. Outcome: Progressing Towards Goal  Pressure Injury Interventions:  Sensory Interventions: Assess changes in LOC, Keep linens dry and wrinkle-free, Maintain/enhance activity level, Minimize linen layers, Pressure redistribution bed/mattress (bed type), Turn and reposition approx.  every two hours (pillows and wedges if needed)         Activity Interventions: Increase time out of bed, Pressure redistribution bed/mattress(bed type)    Mobility Interventions: HOB 30 degrees or less, Pressure redistribution bed/mattress (bed type)    Nutrition Interventions: Document food/fluid/supplement intake, Discuss nutritional consult with provider    Friction and Shear Interventions: HOB 30 degrees or less               Problem: Cath Lab Procedures: Discharge Outcomes  Goal: *Influenza immunization  Outcome: Not Met  Patient doesn't take flu vaccine since chemo    Comments: No signs of skin break down patient moves around in bed and up to walk around

## 2019-02-16 NOTE — PROGRESS NOTES
2/16/2019     Admit Date: 2/14/2019    Admit Diagnosis: ACS (acute coronary syndrome) [facility]) [I24.9]    Principal Problem:    Cardiac arrest ([address] [facility] (2/14/2019)    Active Problems:    ACS (acute coronary syndrome) ([facility] [facility] (2/14/2019)        Assessment/Plan:  Doing well post ACS with cardiac arrest. The culprit lesion was the proximal LAD. In better spirits today  Increase activity  Add low dose BB   possibly home tomorrow     Subjective:  Feels well today       Monica Zuniga denies chest pain, dyspnea, orthopnea. Objective:     Visit Vitals  /66 (BP 1 Location: Right arm, BP Patient Position: At rest)   Pulse (!) 108   Temp 98.5 °F (36.9 °C)   Resp 16   Ht 5' 7\" (1.702 m)   Wt 65 kg (143 lb 6.4 oz)   SpO2 97%   BMI 22.46 kg/m²        Physical Exam:  Neck: supple, symmetrical, trachea midline, no adenopathy, thyroid: not enlarged, symmetric, no tenderness/mass/nodules, no carotid bruit and no JVD  Heart: regular rate and rhythm, S1, S2 normal, S4 present  Lungs: clear to auscultation bilaterally, normal percussion bilaterally  Abdomen: soft, non-tender. Bowel sounds normal. No masses,  no organomegaly  Extremities: extremities normal, atraumatic, no cyanosis or edema  Pulses: 2+ and symmetric  Neurologic: Grossly normal      Labs:    Recent Labs     02/14/19  1310   TROIQ 0.06*     Recent Labs     02/16/19  0504      K 3.5   *   BUN 10   CREA 0.93   *   CA 8.2*     Recent Labs     02/16/19  0504   WBC 9.6   HGB 8.7*   HCT 26.4*        No results for input(s): CHOL, LDLC in the last 72 hours.     No lab exists for component: TGL, HDLC,  HBA1C    Telemetry: normal sinus rhythm     Data Review: current meds, labs,recent radiology, intake/output/weight and problem list reviewed

## 2019-02-17 VITALS
TEMPERATURE: 98 F | DIASTOLIC BLOOD PRESSURE: 80 MMHG | SYSTOLIC BLOOD PRESSURE: 129 MMHG | HEIGHT: 67 IN | WEIGHT: 144.4 LBS | HEART RATE: 73 BPM | OXYGEN SATURATION: 99 % | BODY MASS INDEX: 22.66 KG/M2 | RESPIRATION RATE: 16 BRPM

## 2019-02-17 LAB
ANION GAP SERPL CALC-SCNC: 8 MMOL/L (ref 5–15)
BUN SERPL-MCNC: 9 MG/DL (ref 6–20)
BUN/CREAT SERPL: 10 (ref 12–20)
CALCIUM SERPL-MCNC: 8.7 MG/DL (ref 8.5–10.1)
CHLORIDE SERPL-SCNC: 112 MMOL/L (ref 97–108)
CO2 SERPL-SCNC: 23 MMOL/L (ref 21–32)
CREAT SERPL-MCNC: 0.87 MG/DL (ref 0.7–1.3)
ERYTHROCYTE [DISTWIDTH] IN BLOOD BY AUTOMATED COUNT: 16.2 % (ref 11.5–14.5)
GLUCOSE SERPL-MCNC: 113 MG/DL (ref 65–100)
HCT VFR BLD AUTO: 28.8 % (ref 36.6–50.3)
HGB BLD-MCNC: 9.4 G/DL (ref 12.1–17)
MCH RBC QN AUTO: 30.6 PG (ref 26–34)
MCHC RBC AUTO-ENTMCNC: 32.6 G/DL (ref 30–36.5)
MCV RBC AUTO: 93.8 FL (ref 80–99)
NRBC # BLD: 0 K/UL (ref 0–0.01)
NRBC BLD-RTO: 0 PER 100 WBC
PLATELET # BLD AUTO: 209 K/UL (ref 150–400)
PMV BLD AUTO: 11 FL (ref 8.9–12.9)
POTASSIUM SERPL-SCNC: 4 MMOL/L (ref 3.5–5.1)
RBC # BLD AUTO: 3.07 M/UL (ref 4.1–5.7)
SODIUM SERPL-SCNC: 143 MMOL/L (ref 136–145)
WBC # BLD AUTO: 10.5 K/UL (ref 4.1–11.1)

## 2019-02-17 PROCEDURE — 36415 COLL VENOUS BLD VENIPUNCTURE: CPT

## 2019-02-17 PROCEDURE — 74011250636 HC RX REV CODE- 250/636: Performed by: INTERNAL MEDICINE

## 2019-02-17 PROCEDURE — 74011250637 HC RX REV CODE- 250/637: Performed by: INTERNAL MEDICINE

## 2019-02-17 PROCEDURE — 85027 COMPLETE CBC AUTOMATED: CPT

## 2019-02-17 PROCEDURE — 80048 BASIC METABOLIC PNL TOTAL CA: CPT

## 2019-02-17 PROCEDURE — 74011000250 HC RX REV CODE- 250: Performed by: INTERNAL MEDICINE

## 2019-02-17 RX ORDER — LISINOPRIL 5 MG/1
5 TABLET ORAL DAILY
Status: DISCONTINUED | OUTPATIENT
Start: 2019-02-17 | End: 2019-02-17 | Stop reason: HOSPADM

## 2019-02-17 RX ORDER — ATORVASTATIN CALCIUM 80 MG/1
80 TABLET, FILM COATED ORAL
Qty: 30 TAB | Refills: 5 | Status: SHIPPED | OUTPATIENT
Start: 2019-02-17 | End: 2019-04-29

## 2019-02-17 RX ORDER — METOPROLOL SUCCINATE 25 MG/1
25 TABLET, EXTENDED RELEASE ORAL DAILY
Qty: 30 TAB | Refills: 5 | Status: SHIPPED | OUTPATIENT
Start: 2019-02-18 | End: 2019-09-11

## 2019-02-17 RX ORDER — METOPROLOL SUCCINATE 25 MG/1
25 TABLET, EXTENDED RELEASE ORAL DAILY
Status: DISCONTINUED | OUTPATIENT
Start: 2019-02-17 | End: 2019-02-17 | Stop reason: HOSPADM

## 2019-02-17 RX ORDER — HYDROCORTISONE SODIUM SUCCINATE 100 MG/2ML
50 INJECTION, POWDER, FOR SOLUTION INTRAMUSCULAR; INTRAVENOUS EVERY 24 HOURS
Status: DISCONTINUED | OUTPATIENT
Start: 2019-02-17 | End: 2019-02-17

## 2019-02-17 RX ORDER — ATORVASTATIN CALCIUM 40 MG/1
80 TABLET, FILM COATED ORAL
Status: DISCONTINUED | OUTPATIENT
Start: 2019-02-17 | End: 2019-02-17 | Stop reason: HOSPADM

## 2019-02-17 RX ORDER — HYDROCORTISONE SODIUM SUCCINATE 100 MG/2ML
50 INJECTION, POWDER, FOR SOLUTION INTRAMUSCULAR; INTRAVENOUS EVERY 24 HOURS
Status: DISCONTINUED | OUTPATIENT
Start: 2019-02-17 | End: 2019-02-17 | Stop reason: HOSPADM

## 2019-02-17 RX ORDER — CLOPIDOGREL BISULFATE 75 MG/1
75 TABLET ORAL DAILY
Qty: 30 TAB | Refills: 5 | Status: SHIPPED | OUTPATIENT
Start: 2019-02-18 | End: 2022-04-15

## 2019-02-17 RX ADMIN — Medication 10 ML: at 06:00

## 2019-02-17 RX ADMIN — CLOPIDOGREL BISULFATE 75 MG: 75 TABLET, FILM COATED ORAL at 09:06

## 2019-02-17 RX ADMIN — LISINOPRIL 5 MG: 5 TABLET ORAL at 09:06

## 2019-02-17 RX ADMIN — FAMOTIDINE 20 MG: 10 INJECTION, SOLUTION INTRAVENOUS at 09:07

## 2019-02-17 RX ADMIN — ASPIRIN 81 MG: 81 TABLET ORAL at 09:06

## 2019-02-17 RX ADMIN — METOPROLOL SUCCINATE 25 MG: 25 TABLET, EXTENDED RELEASE ORAL at 09:06

## 2019-02-17 RX ADMIN — HYDROCORTISONE SODIUM SUCCINATE 50 MG: 100 INJECTION, POWDER, FOR SOLUTION INTRAMUSCULAR; INTRAVENOUS at 09:07

## 2019-02-17 NOTE — PROGRESS NOTES
ADULT PROTOCOL: JET AEROSOL ASSESSMENT    Patient  Tiera Friedman     41 y.o.   male     2/16/2019  8:22 PM    Breath Sounds Pre Procedure: Right Breath Sounds: Diminished                               Left Breath Sounds: Diminished    Breath Sounds Post Procedure: Right Breath Sounds: Diminished                                 Left Breath Sounds: Diminished    Breathing pattern: Pre procedure Breathing Pattern: Regular          Post procedure Breathing Pattern: Regular    Heart Rate: Pre procedure Pulse: 96           Post procedure Pulse: 98    Resp Rate: Pre procedure Respirations: 18           Post procedure Respirations: 18    Peak Flow: Pre bronchodilator             Post bronchodilator       FVC/FEV1:  na    Incentive Spirometry:             Cough: Pre procedure                 Post procedure      Suctioned: NO    Sputum: Pre procedure                   Post procedure      Oxygen: O2 Device: Room air        Changed: NO    SpO2: Pre procedure SpO2: 97 %   without oxygen              Post procedure    without oxygen    Nebulizer Therapy: Current medications Aerosolized Medications: DuoNeb      Changed: YES    Smoking History: pack years of use 30    Problem List:   Patient Active Problem List   Diagnosis Code    HTN (hypertension) I10    Tobacco abuse Z72.0    Left sided numbness R20.0    Hyperlipidemia E78.5    Retroperitoneal mass R19.00    Lymphadenopathy F02.2    Follicular lymphoma ([address] C82.90    Anxiety F41.9    Pain, dental K08.89    Sepsis ([address] A41.9    Neutropenic fever (HCC) D70.9, R50.81    ACS (acute coronary syndrome) ([facility] I24.9    Cardiac arrest ([facility] I46.9       Respiratory Therapist: Vinny Daniels

## 2019-02-17 NOTE — PROGRESS NOTES
2/17/2019     Admit Date: 2/14/2019    Admit Diagnosis: ACS (acute coronary syndrome) [facility]) [I24.9]    Principal Problem:    Cardiac arrest ([facility] (2/14/2019)    Active Problems:    ACS (acute coronary syndrome) ([facility] (2/14/2019)        Assessment/Plan:  Doing well. No CP. Ambulating about the unit without difficulty  Ready for discharge  I will see the patient in my office in 2 weeks. Thanks  Prescriptions provided for: clopidogrel, metoprolol succ, atorvastatin. Cardiac med reconciliation filled out. Mr Elo Alanis has been advised to stop smoking. Mr Zapata has been advised that he must take his dual antiplatelet therapy (aspirin and clopidogrel) every day to prevent clotting of his new stent. Subjective:  Feels well       Connee Medici denies chest pain, dyspnea. Objective:     Visit Vitals  /72 (BP 1 Location: Right arm, BP Patient Position: Sitting)   Pulse 95   Temp 97.5 °F (36.4 °C)   Resp 16   Ht 5' 7\" (1.702 m)   Wt 65.5 kg (144 lb 6.4 oz)   SpO2 98%   BMI 22.62 kg/m²        Physical Exam:  Neck: supple, symmetrical, trachea midline, no adenopathy, thyroid: not enlarged, symmetric, no tenderness/mass/nodules, no carotid bruit and no JVD  Heart: regular rate and rhythm, S1, S2 normal  Lungs: clear to auscultation bilaterally, normal percussion bilaterally  Abdomen: soft, non-tender. Bowel sounds normal. No masses,  no organomegaly  Extremities: extremities normal, atraumatic, no cyanosis or edema  Pulses: 2+ and symmetric  Neurologic: Grossly normal      Labs:    No results for input(s): CPK, CKMB, TROIQ in the last 72 hours. No lab exists for component: CKQMB, CPKMB  Recent Labs     02/17/19  0452      K 4.0   *   BUN 9   CREA 0.87   *   CA 8.7     Recent Labs     02/17/19  0452   WBC 10.5   HGB 9.4*   HCT 28.8*        No results for input(s): CHOL, LDLC in the last 72 hours.     No lab exists for component: TGL, HDLC,  HBA1C    Telemetry: normal sinus rhythm     Data Review: current meds, labs,recent radiology, intake/output/weight and problem list reviewed

## 2019-02-17 NOTE — PROGRESS NOTES
PT's aerosol tx's changed to Q4H PRN, Lorie Fuelling RN notified. PT's breath sounds are clear, PT is on room air and does not complain of SOB. Pt said that there is no difference in his breathing before and after TX's. Beena Hendricks RRT notified.

## 2019-02-17 NOTE — DISCHARGE SUMMARY
Discharge Summary       PATIENT ID: Georgia Kincaid  MRN: [id number]   YOB: 1975    DATE OF ADMISSION: 2/14/2019  2:35 PM    DATE OF DISCHARGE: 2/17/2019   PRIMARY CARE PROVIDER: None     ATTENDING PHYSICIAN: Schuyler Schuler DO   DISCHARGING PROVIDER: [address], DO    To contact this individual call [phone] and ask the  to page. If unavailable ask to be transferred the Adult Hospitalist Department. CONSULTATIONS: IP CONSULT TO ONCOLOGY    PROCEDURES/SURGERIES: Procedure(s):  CORONARY ANGIOGRAPHY  LEFT HEART CATH  Percutaneous Coronary Intervention    ADMITTING DIAGNOSES & HOSPITAL COURSE:     41year old male with past medical history follicular lymphoma stage IV, presenting to [facility] as transfer post-cardiac arrest. Patient intubated and transferred to [facility]. EKG with anterior T-wave inversions. Cardiology consulted and LHC demonstrated LAD occlusion, with PCI and TOM to LAD. Patient able to follow commands on ventilator and CODE ICE deferred. He was extubated the next day and transferred to medical floor. Echo with reduced EF, started on beta blocker and ACE-I. He was stable to discharge home with close cardiology and oncology follow up. Echo:   · Estimated left ventricular ejection fraction is 41 - 45%. Abnormal left ventricular wall motion as described on the wall scoring diagram below. Normal left ventricular strain. Mild (grade 1) left ventricular diastolic dysfunction. LHC:  Severe string like stenosis (95%) of the proximal LAD (culprit) and  of the distal RCA with bridging collaterals. Low LV end diastolic filling pressure  Successful PTCA then stenting of the proximal LAD with a 2.25 x 26 mm TOM ([place]) with 0% residual stenosis with brisk runoff and flow. DISCHARGE DIAGNOSES / PLAN:      Cardiac arrest:  -outside hospital cardiac arrest prior to receiving chemotherapy  -reported AED shock with CPR x 1 minute. ROSC.  Intubated  -EKG with anterior T-wave inversion  -Code ICE deferred, patient able to move extremities   -echo with EF 45%, grade I diastolic dysfunction  -BB, ACE-I. Initiate Spironolactone as outpatient      NSTEMI with CAD:  -s/p [facility] with PCI to LAD  -continue aspirin, plavix, atorvastatin     Acute respiratory failure:  -following cardiac arrest   -extubated 2/05     Follicular lymphoma, Stage IV:  -Currently undergoing O-[facility]  -oncology following     Hypotension (previously documented as shock) unclear etiology, resolved  -patient previously required low dose pressors  -wean steroids, discontinue antibiotics  -on intermittent steroids with chemotherapy    Leukocytosis:   -suspect reactive and secondary to events above   -no infection identified, discontinue antibiotics      Anemia, normocytic: continue to monitor, no evidence of bleeding     History of hypertension: hold home medications  Anxiety: home benzo  Chronic pain: home oxycodone           ADDITIONAL CARE RECOMMENDATIONS:    New medications:   1. Metoprolol. Take once daily in the morning  2. Atorvastatin. Take once daily at night  3. Plavix. Take once daily at night. Make sure to take once daily aspirin 81 mg and lisinopril. Stop taking hydrochlorothiazide     Resume other home medications    Return to the emergency department for new or worsening symptoms    PENDING TEST RESULTS:   At the time of discharge the following test results are still pending: none    FOLLOW UP APPOINTMENTS:    Follow-up Information     Follow up With Specialties Details Why Contact Info    None    None ([phone]) Patient stated that they have no PCP               DIET: Cardiac Diet    ACTIVITY: Activity as tolerated    WOUND CARE: none    EQUIPMENT needed: none    DISCHARGE MEDICATIONS:  Current Discharge Medication List      START taking these medications    Details   clopidogrel (PLAVIX) 75 mg tab 1 Tab by Per NG tube route daily.   Qty: 30 Tab, Refills: 5      metoprolol succinate (TOPROL-XL) 25 mg XL tablet Take 1 Tab by mouth daily. Qty: 30 Tab, Refills: 5         CONTINUE these medications which have CHANGED    Details   atorvastatin (LIPITOR) 80 mg tablet Take 1 Tab by mouth nightly. Qty: 30 Tab, Refills: 5         CONTINUE these medications which have NOT CHANGED    Details   ! ! predniSONE (DELTASONE) 20 mg tablet Take 100 mg by mouth daily. Take on Days 1 through 5 each cycle. Refills: 0    Associated Diagnoses: Follicular lymphoma, unspecified follicular lymphoma type, unspecified body region (HCC)      oxyCODONE IR (ROXICODONE) 5 mg immediate release tablet Take 1 Tab by mouth every six (6) hours as needed for Pain. Max Daily Amount: 20 mg.  Qty: 80 Tab, Refills: 0    Associated Diagnoses: Follicular lymphoma grade IIIa of intra-abdominal lymph nodes ([address] Acute neoplasm-related pain      LORazepam (ATIVAN) 0.5 mg tablet Take 1 Tab by mouth every eight (8) hours as needed. Max Daily Amount: 1.5 mg.  Qty: 60 Tab, Refills: 0    Associated Diagnoses: Anxiety      lisinopril (PRINIVIL, ZESTRIL) 10 mg tablet Take 1 Tab by mouth daily. DO NOT TAKE for 5 days  Qty: 30 Tab, Refills: 2    Associated Diagnoses: Hypertension, unspecified type      L. acidoph & paracasei- S therm- Bifido (AUSTIN-Q/RISAQUAD) 8 billion cell cap cap Take 1 Cap by mouth daily. Qty: 14 Cap, Refills: 0      magic mouthwash solution Take 15-30 mL by mouth four (4) times daily. Magic mouth wash   Maalox  Lidocaine 2% viscous   Diphenhydramine oral solution    Swish and spit for mouth pain, ok to swallow for throat pain     Pharmacy to mix equal portions of ingredients to a total volume as indicated in the dispense amount. Qty: 480 mL, Refills: 1    Associated Diagnoses: Pain, dental      prochlorperazine (COMPAZINE) 10 mg tablet Take 1 Tab by mouth every six (6) hours as needed. Qty: 45 Tab, Refills: 2    Associated Diagnoses:  Follicular lymphoma grade IIIa of intra-abdominal lymph nodes (HCC)      senna-docusate (PERICOLACE) 8.6-50 mg per tablet Take 1 Tab by mouth daily. Qty: 60 Tab, Refills: 3      ondansetron hcl (ZOFRAN) 8 mg tablet Take 1 Tab by mouth every eight (8) hours as needed for Nausea. Qty: 30 Tab, Refills: 2    Associated Diagnoses: Follicular lymphoma grade IIIa of intra-abdominal lymph nodes ([address]      ! ! predniSONE (DELTASONE) 20 mg tablet Take 100 mg by mouth daily (with breakfast). (on days 1-5 of each chemo cycle)  Qty: 25 Tab, Refills: 5    Associated Diagnoses: Follicular lymphoma grade IIIa of intra-abdominal lymph nodes (HCC)      naloxone (NARCAN) 4 mg/actuation nasal spray Use 1 spray intranasally, then discard. Repeat with new spray every 2 min as needed for opioid overdose symptoms, alternating nostrils. Qty: 2 Each, Refills: 0      aspirin delayed-release (ASPIR-81) 81 mg tablet Take 1 Tab by mouth daily. Qty: 30 Tab, Refills: 1       !! - Potential duplicate medications found. Please discuss with provider. STOP taking these medications       hydroCHLOROthiazide (HYDRODIURIL) 12.5 mg tablet Comments:   Reason for Stopping:                 NOTIFY YOUR PHYSICIAN FOR ANY OF THE FOLLOWING:   Fever over 101 degrees for 24 hours. Chest pain, shortness of breath, fever, chills, nausea, vomiting, diarrhea, change in mentation, falling, weakness, bleeding. Severe pain or pain not relieved by medications. Or, any other signs or symptoms that you may have questions about. DISPOSITION:  x  Home With:   OT  PT  HH  RN       Long term SNF/Inpatient Rehab    Independent/assisted living    Hospice    Other:       PATIENT CONDITION AT DISCHARGE:     Functional status    Poor     Deconditioned    x Independent      Cognition   x  Lucid     Forgetful     Dementia      Catheters/lines (plus indication)    Cooley     PICC     PEG    x None      Code status   x  Full code     DNR      PHYSICAL EXAMINATION AT DISCHARGE:  Constitutional:  no distress   ENT:  Oral mucous moist, oropharynx benign.  Neck supple,    Resp:  CTA bilaterally. No wheezing/rhonchi/rales. No accessory muscle use   CV:  Regular rhythm, normal rate, no murmurs, gallops, rubs    GI:  Soft, non distended, non tender. normoactive bowel sounds, no hepatosplenomegaly     Musculoskeletal:  No edema, warm, 2+ pulses throughout    Neurologic:  no focal deficits              CHRONIC MEDICAL DIAGNOSES:  Problem List as of 2/17/2019 Date Reviewed: 2/14/2019          Codes Class Noted - Resolved    ACS (acute coronary syndrome) ([facility] ICD-10-CM: I24.9  ICD-9-CM: 411.1  2/14/2019 - Present        * (Principal) Cardiac arrest ([facility] ICD-10-CM: I46.9  ICD-9-CM: 427.5  2/14/2019 - Present        Anxiety ICD-10-CM: F41.9  ICD-9-CM: 300.00  1/9/2019 - Present        Pain, dental ICD-10-CM: L93.92  ICD-9-CM: 525.9  1/9/2019 - Present        Sepsis ([facility] ICD-10-CM: A41.9  ICD-9-CM: 038.9, 995.91  1/9/2019 - Present        Neutropenic fever ([facility] ICD-10-CM: D70.9, R50.81  ICD-9-CM: 288.00, 780.61  1/9/2019 - Present        Follicular lymphoma ([facility] ICD-10-CM: C82.90  ICD-9-CM: 202.00  11/16/2018 - Present        Lymphadenopathy ICD-10-CM: R59.1  ICD-9-CM: 785.6  11/12/2018 - Present        Retroperitoneal mass ICD-10-CM: R19.00  ICD-9-CM: 789.30  11/10/2018 - Present        Hyperlipidemia ICD-10-CM: E78.5  ICD-9-CM: 272.4  Unknown - Present        HTN (hypertension) ICD-10-CM: I10  ICD-9-CM: 401.9  9/27/2016 - Present        Tobacco abuse ICD-10-CM: Z72.0  ICD-9-CM: 305.1  9/27/2016 - Present        Left sided numbness ICD-10-CM: R20.0  ICD-9-CM: 782.0  9/27/2016 - Present              Greater than 30 minutes were spent with the patient on counseling and coordination of care    Signed:   Roseann Nuñez DO  2/17/2019  5:41 PM    For CDMP:    Patient found to have acute systolic and diastolic heart failure. Echo with JU46%, grade I diastolic dysfunction.      Saima Ontiveros DO

## 2019-02-17 NOTE — PROGRESS NOTES
Problem: Pressure Injury - Risk of  Goal: *Prevention of pressure injury  Document Aism Scale and appropriate interventions in the flowsheet. Outcome: Progressing Towards Goal  Pressure Injury Interventions:  Sensory Interventions: Assess changes in LOC, Keep linens dry and wrinkle-free, Maintain/enhance activity level, Minimize linen layers, Pressure redistribution bed/mattress (bed type), Turn and reposition approx.  every two hours (pillows and wedges if needed)         Activity Interventions: Increase time out of bed, Pressure redistribution bed/mattress(bed type)    Mobility Interventions: HOB 30 degrees or less, Pressure redistribution bed/mattress (bed type)    Nutrition Interventions: Document food/fluid/supplement intake    Friction and Shear Interventions: HOB 30 degrees or less               Comments: Call bell in reach room clutter free

## 2019-02-17 NOTE — PROGRESS NOTES
0800- assumed care of pt. No complaints of chest pain throughout the entire shift. Cardiology saw pt and encouraged to ambulate. Will continue to monitor    Bedside shift change report given to Lorraine Desai (oncoming nurse) by Jones Jackson RN (offgoing nurse). Report included the following information SBAR, Kardex, MAR and Cardiac Rhythm sinus tachycardia .

## 2019-02-17 NOTE — PROGRESS NOTES
Patient is resting in bed father at the bedside. He denies having any chest pain, or SOB. His chest is sore from CPR and shocking. His right and left groin has dressing on them from cardiac cath that was done the other day. His left groin has some bruising that has moved some down his groin to his upper leg but no hematoma, bleeding, swelling, or redness at the site. The right groin there is no bruise going down that leg there is no hematoma, bleeding, swelling, or redness at the site. Will continue to monitor patient for changes in his condition. 2300 patient is resting in bed no complaints at this time. No changes in his groin sites or chest sites. 0200 patient is resting with eyes closed    0445 patient is resting in bed with eyes closed VS done and blood drawn and sent to lab. No changes in his groin sites or chest sites. Patient denies having any chest pain or SOB. 0730 Bedside and Verbal shift change report given to Lisa Villa (oncoming nurse) by Melany Llanes RN  (offgoing nurse). Report included the following information SBAR, MAR, Recent Results and Med Rec Status.

## 2019-02-17 NOTE — DISCHARGE INSTRUCTIONS
Patient Discharge Instructions    Aquiles Krishna / [id number] : 1975    Admitted 2019 Discharged: 2019     Take Home Medications            · It is important that you take the medication exactly as they are prescribed. · Keep your medication in the bottles provided by the pharmacist and keep a list of the medication names, dosages, and times to be taken in your wallet. · Do not take other medications without consulting your doctor. · Stop smoking to help prevent further blockage of your heart arteries  · You must take aspirin 81 mg and clopidogrel 75 mg together every day for the next several months to keep you new stent from clotting. If your stent clots, you could have another heart attack, sudden cardiac death or heart failure. BRING ALL OF YOUR MEDICINES TO YOUR OFFICE VISIT with Dr. Magdalena Caro. Follow-up with Katelin Michel MD in 2 weeks. Call the office at [phone] to arrange your appointment. Cardiac Catheterization  Discharge Instructions     Do not drive, operate any machinery, or sign any legal documents for 24 hours after your procedure. You must have someone to drive you home.  You may take a shower 24 hours after your cardiac catheterization. Be sure to get the dressing wet and then remove it; gently wash the area with warm soapy water. Pat dry and leave open to air. To help prevent infections, be sure to keep the cath site clean and dry. No lotions, creams, powders, ointments, etc. in the cath site for approximately 1 week.  Do not take a tub bath, get in a hot tub or swimming pool for approximately 5 days or until the cath site is completely healed.  No strenuous activity or heavy lifting over 10 lbs. for 7 days.  Drink plenty of fluids for 24-48 hours after your cath to flush the contrast dye from your kidneys. No alcoholic beverages for 24 hours. You may resume your previous diet (low fat, low cholesterol) after your cath.        After your cath, some bruising or discomfort is common during the healing process. Tylenol, 1-2 tablets every 6 hours as needed, is recommended if you experience any discomfort. If you experience any signs or symptoms of infection such as fever, chills, or poorly healing incision, persistent tenderness or swelling in the groin, redness and/or warmth to the touch, numbness, significant tingling or pain at the groin site or affected extremity, rash, drainage from the cath site, or if the leg feels tight or swollen, call your physician right away.  If bleeding at the cath site occurs, take a clean gauze pad and apply direct pressure to the groin just above the puncture site. Call 911 immediately, and continue to apply direct pressure until an ambulance gets to your location.  You may return to work  2  days after your cardiac cath if no groin bleeding. Information obtained by :  I understand that if any problems occur once I am at home I am to contact my physician. I understand and acknowledge receipt of the instructions indicated above. R.N.'s Signature                                                                  Date/Time                                                                                                                                              Patient or Representative Signature                                                          Date/Time      [facility] Cessation Program: This is a free, phone/text/email based, smoking cessation program. The program is individualized to meet each patient's needs. To enroll use the link - [facility]. com/quit or text Buck Trujillo to [phone] from any smart phone.        Discharge Instructions       PATIENT ID: Shawn Cuellar  MRN: [id number]   YOB: 1975    DATE OF ADMISSION: 2/14/2019  2:35 PM    DATE OF DISCHARGE: 2/17/2019    PRIMARY CARE PROVIDER: None     ATTENDING PHYSICIAN: Dale Haynes DO  DISCHARGING PROVIDER: [address], DO    To contact this individual call [phone] and ask the  to page. If unavailable ask to be transferred the Adult Hospitalist Department. DISCHARGE DIAGNOSES     Cardiac arrest  Coronary artery disease    CONSULTATIONS: IP CONSULT TO ONCOLOGY    PROCEDURES/SURGERIES: Procedure(s):  CORONARY ANGIOGRAPHY  LEFT HEART CATH  Percutaneous Coronary Intervention    PENDING TEST RESULTS:   At the time of discharge the following test results are still pending: none    FOLLOW UP APPOINTMENTS:   Follow-up Information     Follow up With Specialties Details Why Contact Info    None    None ([phone]) Patient stated that they have no PCP             ADDITIONAL CARE RECOMMENDATIONS:     New medications:   1. Metoprolol. Take once daily in the morning  2. Atorvastatin. Take once daily at night  3. Plavix. Take once daily at night. Make sure to take once daily aspirin 81 mg and lisinopril. Stop taking hydrochlorothiazide     Resume other home medications    Return to the emergency department for new or worsening symptoms    DIET: Cardiac Diet    ACTIVITY: Activity as tolerated    WOUND CARE: none    EQUIPMENT needed: none      DISCHARGE MEDICATIONS:   See Medication Reconciliation Form    · It is important that you take the medication exactly as they are prescribed. · Keep your medication in the bottles provided by the pharmacist and keep a list of the medication names, dosages, and times to be taken in your wallet. · Do not take other medications without consulting your doctor. NOTIFY YOUR PHYSICIAN FOR ANY OF THE FOLLOWING:   Fever over 101 degrees for 24 hours. Chest pain, shortness of breath, fever, chills, nausea, vomiting, diarrhea, change in mentation, falling, weakness, bleeding.  Severe pain or pain not relieved by medications. Or, any other signs or symptoms that you may have questions about.       DISPOSITION:  x  Home With:   OT  PT  HH  RN       SNF/Inpatient Rehab/LTAC    Independent/assisted living    Hospice    Other:     CDMP Checked:   Yes x     PROBLEM LIST Updated:  Yes x       Signed:   Vicenta River DO  2/17/2019  3:48 PM

## 2019-02-17 NOTE — PROGRESS NOTES
0800- assumed care of pt    1230- pt ambulated around unit x 3 with no complaints of chest pain, shortness of breath, or palpitations. 1330- spoke with Dr. Mitra Shea and updated her on pts condition; Pt to be discharged this evening.

## 2019-02-18 ENCOUNTER — TELEPHONE (OUTPATIENT)
Dept: CARDIAC REHAB | Age: 44
End: 2019-02-18

## 2019-02-18 ENCOUNTER — TELEPHONE (OUTPATIENT)
Dept: PALLATIVE CARE | Age: 44
End: 2019-02-18

## 2019-02-18 NOTE — TELEPHONE ENCOUNTER
2/18/2019 Cardiac Rehab: Called Mr. Liliana Turner  to discuss participation in the Cardiac Rehab Program following NSTEMI/ stent on 2/14/2019. Spoke with pt. and will refer to [facility].  Nighat Garcia RN

## 2019-02-18 NOTE — TELEPHONE ENCOUNTER
Called patient to advise/confirm upcoming appt with Dr. Iftikhar Hoang on  2/19/19    at 10:30am at [facility]. Pt confirmed. Also advised to please bring in your Drivers License and Insurance Card with you to appointment as well as any prescription pain medication in the original container with you to appt.

## 2019-02-19 ENCOUNTER — OFFICE VISIT (OUTPATIENT)
Dept: PALLATIVE CARE | Age: 44
End: 2019-02-19

## 2019-02-19 VITALS
TEMPERATURE: 97.3 F | OXYGEN SATURATION: 100 % | DIASTOLIC BLOOD PRESSURE: 82 MMHG | HEIGHT: 67 IN | WEIGHT: 151 LBS | BODY MASS INDEX: 23.7 KG/M2 | HEART RATE: 107 BPM | RESPIRATION RATE: 16 BRPM | SYSTOLIC BLOOD PRESSURE: 122 MMHG

## 2019-02-19 DIAGNOSIS — F41.9 ANXIETY: Primary | ICD-10-CM

## 2019-02-19 DIAGNOSIS — R53.83 OTHER FATIGUE: ICD-10-CM

## 2019-02-19 DIAGNOSIS — G89.18 ACUTE POSTOPERATIVE PAIN OF LEFT GROIN: ICD-10-CM

## 2019-02-19 DIAGNOSIS — M54.50 CHRONIC LEFT-SIDED LOW BACK PAIN WITHOUT SCIATICA: ICD-10-CM

## 2019-02-19 DIAGNOSIS — R63.0 POOR APPETITE: ICD-10-CM

## 2019-02-19 DIAGNOSIS — C82.33 FOLLICULAR LYMPHOMA GRADE IIIA OF INTRA-ABDOMINAL LYMPH NODES (HCC): ICD-10-CM

## 2019-02-19 DIAGNOSIS — R10.32 ACUTE POSTOPERATIVE PAIN OF LEFT GROIN: ICD-10-CM

## 2019-02-19 DIAGNOSIS — F32.9 REACTIVE DEPRESSION: ICD-10-CM

## 2019-02-19 DIAGNOSIS — G89.29 CHRONIC LEFT-SIDED LOW BACK PAIN WITHOUT SCIATICA: ICD-10-CM

## 2019-02-19 LAB
BACTERIA SPEC CULT: NORMAL
SERVICE CMNT-IMP: NORMAL

## 2019-02-19 RX ORDER — SODIUM CHLORIDE 0.9 % (FLUSH) 0.9 %
10 SYRINGE (ML) INJECTION AS NEEDED
Status: CANCELLED
Start: 2019-02-22

## 2019-02-19 RX ORDER — DIPHENHYDRAMINE HYDROCHLORIDE 50 MG/ML
50 INJECTION, SOLUTION INTRAMUSCULAR; INTRAVENOUS AS NEEDED
Status: CANCELLED
Start: 2019-02-22

## 2019-02-19 RX ORDER — DOXORUBICIN HYDROCHLORIDE 2 MG/ML
25 INJECTION, SOLUTION INTRAVENOUS ONCE
Status: CANCELLED | OUTPATIENT
Start: 2019-02-22 | End: 2019-02-22

## 2019-02-19 RX ORDER — OXYCODONE HYDROCHLORIDE 5 MG/1
5 TABLET ORAL
Qty: 120 TAB | Refills: 0 | Status: SHIPPED | OUTPATIENT
Start: 2019-02-19 | End: 2019-03-12 | Stop reason: SDUPTHER

## 2019-02-19 RX ORDER — ACETAMINOPHEN 325 MG/1
650 TABLET ORAL ONCE
Status: CANCELLED | OUTPATIENT
Start: 2019-02-22 | End: 2019-02-22

## 2019-02-19 RX ORDER — SODIUM CHLORIDE 9 MG/ML
100 INJECTION, SOLUTION INTRAVENOUS CONTINUOUS
Status: CANCELLED | OUTPATIENT
Start: 2019-02-22

## 2019-02-19 RX ORDER — HYDROCORTISONE SODIUM SUCCINATE 100 MG/2ML
100 INJECTION, POWDER, FOR SOLUTION INTRAMUSCULAR; INTRAVENOUS AS NEEDED
Status: CANCELLED | OUTPATIENT
Start: 2019-02-22

## 2019-02-19 RX ORDER — SODIUM CHLORIDE 9 MG/ML
25 INJECTION, SOLUTION INTRAVENOUS CONTINUOUS
Status: CANCELLED | OUTPATIENT
Start: 2019-02-22

## 2019-02-19 RX ORDER — EPINEPHRINE 1 MG/ML
0.3 INJECTION, SOLUTION, CONCENTRATE INTRAVENOUS AS NEEDED
Status: CANCELLED | OUTPATIENT
Start: 2019-02-22

## 2019-02-19 RX ORDER — PREDNISONE 20 MG/1
100 TABLET ORAL
Status: CANCELLED | OUTPATIENT
Start: 2019-02-22 | End: 2019-02-22

## 2019-02-19 RX ORDER — DIPHENHYDRAMINE HYDROCHLORIDE 50 MG/ML
50 INJECTION, SOLUTION INTRAMUSCULAR; INTRAVENOUS ONCE
Status: CANCELLED | OUTPATIENT
Start: 2019-02-22 | End: 2019-02-22

## 2019-02-19 RX ORDER — ALBUTEROL SULFATE 0.83 MG/ML
2.5 SOLUTION RESPIRATORY (INHALATION) AS NEEDED
Status: CANCELLED
Start: 2019-02-22

## 2019-02-19 RX ORDER — HEPARIN 100 UNIT/ML
300-500 SYRINGE INTRAVENOUS AS NEEDED
Status: CANCELLED | OUTPATIENT
Start: 2019-02-22

## 2019-02-19 RX ORDER — ESCITALOPRAM OXALATE 10 MG/1
10 TABLET ORAL DAILY
Qty: 30 TAB | Refills: 3 | Status: SHIPPED | OUTPATIENT
Start: 2019-02-19 | End: 2019-06-12 | Stop reason: SDUPTHER

## 2019-02-19 RX ORDER — ACETAMINOPHEN 325 MG/1
650 TABLET ORAL AS NEEDED
Status: CANCELLED
Start: 2019-02-22

## 2019-02-19 RX ORDER — ALPRAZOLAM 1 MG/1
1 TABLET ORAL
Qty: 60 TAB | Refills: 1 | Status: SHIPPED | OUTPATIENT
Start: 2019-02-19 | End: 2019-03-22 | Stop reason: SDUPTHER

## 2019-02-19 RX ORDER — ONDANSETRON 2 MG/ML
8 INJECTION INTRAMUSCULAR; INTRAVENOUS AS NEEDED
Status: CANCELLED | OUTPATIENT
Start: 2019-02-22

## 2019-02-19 RX ORDER — PALONOSETRON 0.05 MG/ML
0.25 INJECTION, SOLUTION INTRAVENOUS ONCE
Status: CANCELLED | OUTPATIENT
Start: 2019-02-22 | End: 2019-02-22

## 2019-02-19 RX ORDER — SODIUM CHLORIDE 9 MG/ML
10 INJECTION INTRAMUSCULAR; INTRAVENOUS; SUBCUTANEOUS AS NEEDED
Status: CANCELLED | OUTPATIENT
Start: 2019-02-22

## 2019-02-19 NOTE — PROGRESS NOTES
Palliative Medicine Outpatient Services  [place]: [phone] ([phone]    Patient Name: Marva Riggins  YOB: 1975    Date of Current Visit: 02/19/19  Location of Current Visit:    [] [facility] Office  [x] [facility] Office  [] [id number] [facility] Office  [] Home  [] Other:      Date of Initial Visit: 2/19/19   Referral from: Sherly Mendoza MD  Primary Care Physician: None      SUMMARY:   Marva Riggins is a 41y.o. year old with high-grade follicular lymphoma, who was referred to Palliative Medicine by Dr. Karla Warren for symptom management and supportive care. He was diagnosed in 11/2018 after presenting with back pain. He is currently receiving systemic chemotherapy. He suffered cardiac arrest during cycle #3 due to previously undiagnosed severe stenosis of the LAD      The patients social history includes: he is single. He lives with his father in [place]. He has 3 teenage children who live with their mother. 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 Palliative Medicine is addressing the following current patient/family concerns: anxiety, depression, left mid- and low back pain related to malignancy, poor appetite, fatigue, advanced care planning. Initial Referral Intake note from Casandra Lu RN reviewed prior to visit   PALLIATIVE DIAGNOSES:       ICD-10-CM ICD-9-CM    1. Anxiety F41.9 300.00 ALPRAZolam (XANAX) 1 mg tablet      escitalopram oxalate (LEXAPRO) 10 mg tablet   2. Reactive depression F32.9 300.4 escitalopram oxalate (LEXAPRO) 10 mg tablet   3. Chronic left-sided low back pain without sciatica M54.5 724.2     G89.29 338.29    4. Acute postoperative pain of left groin R10.32 789.09     G89.18 338.18    5. Follicular lymphoma grade IIIa of intra-abdominal lymph nodes (HCC) C82.33 202.03 oxyCODONE IR (ROXICODONE) 5 mg immediate release tablet      ALPRAZolam (XANAX) 1 mg tablet   6. Other fatigue R53.83 780.79    7.  Poor appetite R63.0 783.0           PLAN:   Patient Instructions     Dear Marva Riggins ,    It was a pleasure seeing you today in [facility]. We will see you again in 3 to 4 weeks. Your stated goal: continue treatment for your lymphoma with control of your anxiety and pain    Your described symptoms were: Fatigue: 4 Drowsiness: 2   Depression: 4 Pain: 8   Anxiety: 10 Nausea: 3   Anorexia: 5 Dyspnea: 5   Best Well-Bein Constipation: No   Other Problem (Comment): 0       This is the plan we talked about:    1. Start citalopram 10-mg daily as your baseline anxiety and depression medication. 2. Stop lorazepam.    3. Start Xanax (alprazolam) 1-mg three times a day as needed for anxiety. 4. You feel your back pain is well-controlled and you expect the left groin pain from your recent cardiac catheterization to get better as the site heals. 5. Continue oxycodone 5-mg: take 1 to 2 tabs every 4 hours as needed. 6. You're moving your bowels regularly. If you go more than 2 days without a bowel movement, start senna 1 to 2 tabs at bedtime. This is available over-the-counter. 7. You appetite is picking back up. You're maintaining your weight in the 145 to 150 pound range. 8. You get short of breath sometimes with activity but this is getting better. This may be related to the recent blockages noted in your coronary arteries and may be improved since the procedure to open up the largest blockage. 9. You experienced nausea with chemo in the past but not recently. Continue using ondansetron and prochlorperazine as prescribed by Dr. Antonio Lopes.     This is what you have shared with us about Advance Care Planning:      Primary Decision Maker: Letitia Bernard - Father - [phone]    Secondary Decision Maker: Sherrill Mosquera - Other Relative - [phone]  [] Named in a scanned document   [x] Legal Next of Kin  [] Guardian    ACP documents you current have include:  [] Advance Directive or Living Will  [] Durable Do Not Resuscitate  [] Physician Orders for Scope of Treatment (POST)  [] Medical Power of   [] Other      The Palliative Medicine Team is here to support you and your family. Sincerely,      Jordon Mattson MD and the Palliative Medicine Team      Counseling and Coordination: note routed to care team       GOALS OF CARE / TREATMENT PREFERENCES:   [====Goals of Care====]  GOALS OF CARE:  Patient / health care proxy stated goals: See Patient Instructions / Summary    TREATMENT PREFERENCES:   Code Status:  [x] Attempt Resuscitation       [] Do Not Attempt Resuscitation    Advance Care Planning:  [x] [facility] Interdisciplinary Team has updated the ACP Navigator with Decision Maker and Patient Capacity      Primary Decision Maker: Alessandro Lozoya - [phone]    Secondary Decision Maker: DaxabernieSherrill - Other Relative - [phone]  [] Named in a scanned document   [x] Legal Next of Kin  [] Guardian    Other:  (If patient appropriate for POST, consider using PALLPOST smart phrase here)    The palliative care team has discussed with patient / health care proxy about goals of care / treatment preferences for patient.  [====Goals of Care====]     PRESCRIPTIONS GIVEN:     Medications Ordered Today   Medications    oxyCODONE IR (ROXICODONE) 5 mg immediate release tablet     Sig: Take 1 Tab by mouth every four (4) hours as needed for Pain. Max Daily Amount: 30 mg. Dispense:  120 Tab     Refill:  0    ALPRAZolam (XANAX) 1 mg tablet     Sig: Take 1 Tab by mouth three (3) times daily as needed for Anxiety. Max Daily Amount: 3 mg. Dispense:  60 Tab     Refill:  1    escitalopram oxalate (LEXAPRO) 10 mg tablet     Sig: Take 1 Tab by mouth daily.      Dispense:  30 Tab     Refill:  3           FOLLOW UP:     Future Appointments   Date Time Provider Dahlia Epps   2/22/2019 11:30 AM SS INF5 CH2 <4H [facility]   2/22/2019 11:45 AM Roberto Blake NP [facility] SCHED   3/5/2019 10:00 AM SFM CARDIOPULM SPECIALTY [facility]   3/7/2019  8:00 AM SS INF1 CH3 <4H [facility]   3/7/2019  8:15 AM Kasie Blake NP [facility] SCHED   3/8/2019  9:00 AM ECHO LAB [facility]   3/19/2019 11:30 AM Dipti Montalvo MD PCS [facility] SCHED   3/28/2019  8:00 AM SS INF1 CH3 <4H [facility]           PHYSICIANS INVOLVED IN CARE:   Patient Care Team:  None as PCP - Romario Ibarra MD (Hematology and Oncology)  Sixto Hernandez MD as Physician (Palliative Medicine)  Sixto Hernandez MD as Physician (Palliative Medicine)       HISTORY:   Reviewed patient-completed ESAS and advance care planning form. Reviewed patient record in prescription monitoring program.    CHIEF COMPLAINT:   Chief Complaint   Patient presents with    Anxiety       HPI/SUBJECTIVE:    The patient is: [x] Verbal / [] Nonverbal     He has a lot of anxiety. He's lived with anxiety for a long time, sometimes it's been worse than others, like when he lost his job and lost his insurance. He took Wellbutrin then which made him more anxious and depressed. He's had more anxiety since he was diagnosed with lymphoma. He's been taking lorazepam and it doesn't help at all, even when he tried taking 2 pills. This is his biggest problem now. He feels depressed but it's not as bad as the anxiety. He has pain in his back, that's what brought him to the hospital in November. He's been taking oxycodone which helps some. The groin pain started after his operation (cardiac cath) in the hospital last week. He expects that will get better as it heals. His pain doesn't bother him too much, not like the anxiety. His appetite is getting better. He's lost ~30# since last fall but that's leveled off now. He's moving his bowels regularly. He sometimes gets short of breath but not as much as used to. He doesn't have chest pain, never did. He sleeps well at night. He doesn't have the energy to do much during the day.     He lives with his father. He sees his three teen-age children frequently (they live with their mother). His children give him a lot of strength. Clinical Pain Assessment (nonverbal scale for nonverbal patients):   [++++ Clinical Pain Assessment++++]  [++++Pain Severity++++]: Pain: 8  [++++Pain Character++++]: stabbing pain in back, groin feels like a pulled muscle  [++++Pain Duration++++]: months for back pain, weeks for groin pain  [++++Pain Effect++++]: little  [++++Pain Factors++++]: oxycodone helps with back pain, groin pain elicited by standing and walking  [++++Pain Frequency++++]: constant back pain with varying intensity, intermittent groin pain  [++++Pain Location++++]: left lower back, left groin and leg  [++++ Clinical Pain Assessment++++]       FUNCTIONAL ASSESSMENT:     Palliative Performance Scale (PPS):  PPS: 80       PSYCHOSOCIAL/SPIRITUAL SCREENING:     Any spiritual / Hindu concerns:  [] Yes /  [x] No    Caregiver Burnout:  [] Yes /  [x] No /  [] No Caregiver Present      Anticipatory grief assessment:   [x] Normal  / [] Maladaptive       ESAS Anxiety: Anxiety: 10    ESAS Depression: Depression: 4       REVIEW OF SYSTEMS:     The following systems were [x] reviewed / [] unable to be reviewed  Systems: constitutional, ears/nose/mouth/throat, respiratory, gastrointestinal, genitourinary, musculoskeletal, integumentary, neurologic, psychiatric, endocrine. Positive findings noted below. Modified ESAS Completed by: provider   Fatigue: 4 Drowsiness: 2   Depression: 4 Pain: 8   Anxiety: 10 Nausea: 3   Anorexia: 5 Dyspnea: 5   Best Well-Bein Constipation: No   Other Problem (Comment): 0          PHYSICAL EXAM:     Wt Readings from Last 3 Encounters:   19 151 lb (68.5 kg)   19 144 lb 6.4 oz (65.5 kg)   19 146 lb 4.8 oz (66.4 kg)     Blood pressure 122/82, pulse (!) 107, temperature 97.3 °F (36.3 °C), temperature source Oral, resp.  rate 16, height 5' 7\" (1.702 m), weight 151 lb (68.5 kg), SpO2 100 %. Last bowel movement: See Nursing Note    Constitutional: sitting in chair, ill-appearing  Eyes: pupils equal, anicteric  ENMT: no nasal discharge, moist mucous membranes  Cardiovascular: regular rhythm, no peripheral edema  Respiratory: breathing not labored, symmetric  Gastrointestinal: soft non-tender, +bowel sounds  Musculoskeletal: no deformity, no tenderness to palpation  Skin: warm, dry  Neurologic: following commands, moving all extremities  Psychiatric: full affect, no hallucinations  Other:       HISTORY:     Past Medical History:   Diagnosis Date    Anxiety     Hyperlipidemia     Hypertension     Lymphadenopathy 11/12/2018      No past surgical history on file. Family History   Problem Relation Age of Onset    Hypertension Father     Diabetes Mother       History reviewed, no pertinent family history. Social History     Tobacco Use    Smoking status: Current Every Day Smoker     Packs/day: 1.00     Years: 20.00     Pack years: 20.00    Smokeless tobacco: Never Used   Substance Use Topics    Alcohol use: No     Frequency: Never     No Known Allergies   Current Outpatient Medications   Medication Sig    oxyCODONE IR (ROXICODONE) 5 mg immediate release tablet Take 1 Tab by mouth every four (4) hours as needed for Pain. Max Daily Amount: 30 mg.    atorvastatin (LIPITOR) 80 mg tablet Take 1 Tab by mouth nightly.  clopidogrel (PLAVIX) 75 mg tab 1 Tab by Per NG tube route daily.  aspirin delayed-release (ASPIR-81) 81 mg tablet Take 1 Tab by mouth daily.  ALPRAZolam (XANAX) 1 mg tablet Take 1 Tab by mouth three (3) times daily as needed for Anxiety. Max Daily Amount: 3 mg.  escitalopram oxalate (LEXAPRO) 10 mg tablet Take 1 Tab by mouth daily.  metoprolol succinate (TOPROL-XL) 25 mg XL tablet Take 1 Tab by mouth daily.  predniSONE (DELTASONE) 20 mg tablet Take 100 mg by mouth daily. Take on Days 1 through 5 each cycle.     lisinopril (PRINIVIL, ZESTRIL) 10 mg tablet Take 1 Tab by mouth daily. DO NOT TAKE for 5 days    L. acidoph & paracasei- S therm- Bifido (AUSTIN-Q/RISAQUAD) 8 billion cell cap cap Take 1 Cap by mouth daily.  magic mouthwash solution Take 15-30 mL by mouth four (4) times daily. Magic mouth wash   Maalox  Lidocaine 2% viscous   Diphenhydramine oral solution    Swish and spit for mouth pain, ok to swallow for throat pain     Pharmacy to mix equal portions of ingredients to a total volume as indicated in the dispense amount.  prochlorperazine (COMPAZINE) 10 mg tablet Take 1 Tab by mouth every six (6) hours as needed.  senna-docusate (PERICOLACE) 8.6-50 mg per tablet Take 1 Tab by mouth daily.  ondansetron hcl (ZOFRAN) 8 mg tablet Take 1 Tab by mouth every eight (8) hours as needed for Nausea.  predniSONE (DELTASONE) 20 mg tablet Take 100 mg by mouth daily (with breakfast). (on days 1-5 of each chemo cycle)    naloxone (NARCAN) 4 mg/actuation nasal spray Use 1 spray intranasally, then discard. Repeat with new spray every 2 min as needed for opioid overdose symptoms, alternating nostrils. No current facility-administered medications for this visit. LAB DATA REVIEWED:     Lab Results   Component Value Date/Time    WBC 10.5 02/17/2019 04:52 AM    HGB 9.4 (L) 02/17/2019 04:52 AM    PLATELET 948 02/99/0714 04:52 AM     Lab Results   Component Value Date/Time    Sodium 143 02/17/2019 04:52 AM    Potassium 4.0 02/17/2019 04:52 AM    Chloride 112 (H) 02/17/2019 04:52 AM    CO2 23 02/17/2019 04:52 AM    BUN 9 02/17/2019 04:52 AM    Creatinine 0.87 02/17/2019 04:52 AM    Calcium 8.7 02/17/2019 04:52 AM    Magnesium 1.7 01/12/2019 04:05 AM    Phosphorus 2.0 (L) 01/12/2019 04:05 AM      Lab Results   Component Value Date/Time    AST (SGOT) 21 02/15/2019 02:15 AM    Alk.  phosphatase 90 02/15/2019 02:15 AM    Protein, total 5.5 (L) 02/15/2019 02:15 AM    Albumin 2.9 (L) 02/15/2019 02:15 AM    Globulin 2.6 02/15/2019 02:15 AM     Lab Results   Component Value Date/Time    INR 1.0 11/09/2018 09:49 PM    Prothrombin time 9.5 11/09/2018 09:49 PM    aPTT 26.1 07/30/2018 07:46 AM      No results found for: IRON, FE, TIBC, IBCT, PSAT, FERR        CONTROLLED SUBSTANCES SAFETY ASSESSMENT (IF ON CONTROLLED SUBSTANCES):     Reviewed opioid safety handout:  [x] Yes   [] No  24 hour opioid dose >150mg morphine equivalent/day:  [] Yes   [x] No  Benzodiazepines:  [x] Yes   [] No  Sleep apnea:  [] Yes   [x] No  Urine Toxicology Testing within last 6 months:  [] Yes   [] No  History of or new aberrant medication taking behaviors:  [] Yes   [] No  Has Narcan been prescribed [] Yes   [x] No          Total time:   Counseling / coordination time:   > 50% counseling / coordination?:

## 2019-02-19 NOTE — PATIENT INSTRUCTIONS
Dear Archana Thompson ,    It was a pleasure seeing you today in [facility]. We will see you again in 3 to 4 weeks. Your stated goal: continue treatment for your lymphoma with control of your anxiety and pain    Your described symptoms were: Fatigue: 4 Drowsiness: 2   Depression: 4 Pain: 8   Anxiety: 10 Nausea: 3   Anorexia: 5 Dyspnea: 5   Best Well-Bein Constipation: No   Other Problem (Comment): 0       This is the plan we talked about:    1. Start citalopram 10-mg daily as your baseline anxiety and depression medication. 2. Stop lorazepam.    3. Start Xanax (alprazolam) 1-mg three times a day as needed for anxiety. 4. You feel your back pain is well-controlled and you expect the left groin pain from your recent cardiac catheterization to get better as the site heals. 5. Continue oxycodone 5-mg: take 1 to 2 tabs every 4 hours as needed. 6. You're moving your bowels regularly. If you go more than 2 days without a bowel movement, start senna 1 to 2 tabs at bedtime. This is available over-the-counter. 7. You appetite is picking back up. You're maintaining your weight in the 145 to 150 pound range. 8. You get short of breath sometimes with activity but this is getting better. This may be related to the recent blockages noted in your coronary arteries and may be improved since the procedure to open up the largest blockage. 9. You experienced nausea with chemo in the past but not recently. Continue using ondansetron and prochlorperazine as prescribed by Dr. Tevin Kramer.     This is what you have shared with us about Advance Care Planning:      Primary Decision Maker: Cindy Barrera - Father - [phone]    Secondary Decision Maker: Sherrill Mosquera - Other Relative - [phone]  [] Named in a scanned document   [x] Legal Next of Kin  [] Guardian    ACP documents you current have include:  [] Advance Directive or Living Will  [] Durable Do Not Resuscitate  [] Physician Orders for Scope of Treatment (POST)  [] Medical Power of   [] Other      The Palliative Medicine Team is here to support you and your family.          Sincerely,      Raimundo Alamo MD and the Palliative Medicine Team

## 2019-02-19 NOTE — PROGRESS NOTES
Palliative Medicine Office Visit  Palliative Medicine Nurse Check In  [phone]    Patient Name: Kim Degroot  YOB: 1975      Date of Office Visit:  2/19/2019      Patient states:  Patient C/O increased anxiety, difficulty sleeping, and left lower back pain. From Check In Sheet (scanned in Media):  Has a medical provider talked with you about cardiopulmonary resuscitation (CPR)? [x] Yes   [] No   [] Unable to obtain    Nurse reminder to complete or update ACP FlowSheet:    Is ACP on the Problem List?    [] Yes    [x] No  IF ACP Document is ON FILE; Nurse to place ACP on Problem List     Is there an ACP Note in Chart Review/Note? [] Yes    [x] No   If NO: [address] Planning 2/15/2019   Confirm Advance Directive None   Patient Would Like to Complete Advance Directive No   Does the patient have other document types (No Data)   Primary Decision Maker ([address]): Wm Lin  Relationship to patient: Father  Phone number:[phone]  [] Named in a scanned document   [x] Legal Next of Kin  [] Guardian    ACP documents you current have include:  [] Advance Directive or Living Will  [] Durable Do Not Resuscitate  [] Physician Orders for Scope of Treatment (POST)  [] Medical Power of   [] Other      Is there anything that we should know about you as a person in order to provide you the best care possible? no    Have you been to the ER, urgent care clinic since your last visit? [x] Yes   [] No   [] Unable to obtain    Have you been hospitalized since your last visit? [x] Yes   [] No   [] Unable to obtain    Have you seen or consulted any other health care providers outside of the [address] [place] [address] since your last visit?    [] Yes   [x] No   [] Unable to obtain    Functional status (describe):     Last BM:2/19/2019       accessed (date): 2/18/19    Bottle review (for opioid pain medication): Patient did not bring any medication.   Medication 1:   Date filled:   Directions:   # filled:   # left:   # pills taking per day:  Last dose taken:    Medication 2:   Date filled:   Directions:   # filled:   # left:   # pills taking per day:  Last dose taken:    Medication 3:   Date filled:   Directions:   # filled:   # left:   # pills taking per day:  Last dose taken:    Medication 4:   Date filled:   Directions:   # filled:   # left:   # pills taking per day:  Last dose taken:

## 2019-02-20 ENCOUNTER — TELEPHONE (OUTPATIENT)
Dept: ONCOLOGY | Age: 44
End: 2019-02-20

## 2019-02-20 RX ORDER — SODIUM CHLORIDE 0.9 % (FLUSH) 0.9 %
10 SYRINGE (ML) INJECTION AS NEEDED
Status: CANCELLED
Start: 2019-03-01

## 2019-02-20 RX ORDER — SODIUM CHLORIDE 0.9 % (FLUSH) 0.9 %
10 SYRINGE (ML) INJECTION AS NEEDED
Status: CANCELLED
Start: 2019-02-28

## 2019-02-20 RX ORDER — ONDANSETRON 2 MG/ML
8 INJECTION INTRAMUSCULAR; INTRAVENOUS ONCE
Status: CANCELLED | OUTPATIENT
Start: 2019-03-01 | End: 2019-03-01

## 2019-02-20 RX ORDER — ONDANSETRON 2 MG/ML
8 INJECTION INTRAMUSCULAR; INTRAVENOUS AS NEEDED
Status: CANCELLED | OUTPATIENT
Start: 2019-03-01

## 2019-02-20 RX ORDER — DIPHENHYDRAMINE HYDROCHLORIDE 50 MG/ML
50 INJECTION, SOLUTION INTRAMUSCULAR; INTRAVENOUS ONCE
Status: CANCELLED
Start: 2019-02-28 | End: 2019-02-28

## 2019-02-20 RX ORDER — EPINEPHRINE 1 MG/ML
0.3 INJECTION, SOLUTION, CONCENTRATE INTRAVENOUS AS NEEDED
Status: CANCELLED | OUTPATIENT
Start: 2019-03-01

## 2019-02-20 RX ORDER — ACETAMINOPHEN 325 MG/1
650 TABLET ORAL AS NEEDED
Status: CANCELLED
Start: 2019-02-28

## 2019-02-20 RX ORDER — SODIUM CHLORIDE 9 MG/ML
10 INJECTION INTRAMUSCULAR; INTRAVENOUS; SUBCUTANEOUS AS NEEDED
Status: CANCELLED | OUTPATIENT
Start: 2019-03-01

## 2019-02-20 RX ORDER — ACETAMINOPHEN 325 MG/1
650 TABLET ORAL AS NEEDED
Status: CANCELLED
Start: 2019-03-01

## 2019-02-20 RX ORDER — HYDROCORTISONE SODIUM SUCCINATE 100 MG/2ML
100 INJECTION, POWDER, FOR SOLUTION INTRAMUSCULAR; INTRAVENOUS AS NEEDED
Status: CANCELLED | OUTPATIENT
Start: 2019-03-01

## 2019-02-20 RX ORDER — SODIUM CHLORIDE 9 MG/ML
10 INJECTION INTRAMUSCULAR; INTRAVENOUS; SUBCUTANEOUS AS NEEDED
Status: CANCELLED | OUTPATIENT
Start: 2019-02-28

## 2019-02-20 RX ORDER — HEPARIN 100 UNIT/ML
300-500 SYRINGE INTRAVENOUS AS NEEDED
Status: CANCELLED
Start: 2019-03-01

## 2019-02-20 RX ORDER — HYDROCORTISONE SODIUM SUCCINATE 100 MG/2ML
100 INJECTION, POWDER, FOR SOLUTION INTRAMUSCULAR; INTRAVENOUS AS NEEDED
Status: CANCELLED | OUTPATIENT
Start: 2019-02-28

## 2019-02-20 RX ORDER — DIPHENHYDRAMINE HYDROCHLORIDE 50 MG/ML
50 INJECTION, SOLUTION INTRAMUSCULAR; INTRAVENOUS AS NEEDED
Status: CANCELLED
Start: 2019-02-28

## 2019-02-20 RX ORDER — ACETAMINOPHEN 325 MG/1
650 TABLET ORAL ONCE
Status: CANCELLED
Start: 2019-02-28 | End: 2019-02-28

## 2019-02-20 RX ORDER — HEPARIN 100 UNIT/ML
300-500 SYRINGE INTRAVENOUS AS NEEDED
Status: CANCELLED
Start: 2019-02-28

## 2019-02-20 RX ORDER — ALBUTEROL SULFATE 0.83 MG/ML
2.5 SOLUTION RESPIRATORY (INHALATION) AS NEEDED
Status: CANCELLED
Start: 2019-03-01

## 2019-02-20 RX ORDER — ALBUTEROL SULFATE 0.83 MG/ML
2.5 SOLUTION RESPIRATORY (INHALATION) AS NEEDED
Status: CANCELLED
Start: 2019-02-28

## 2019-02-20 RX ORDER — ONDANSETRON 2 MG/ML
8 INJECTION INTRAMUSCULAR; INTRAVENOUS ONCE
Status: CANCELLED | OUTPATIENT
Start: 2019-02-28 | End: 2019-02-28

## 2019-02-20 RX ORDER — ONDANSETRON 2 MG/ML
8 INJECTION INTRAMUSCULAR; INTRAVENOUS AS NEEDED
Status: CANCELLED | OUTPATIENT
Start: 2019-02-28

## 2019-02-20 RX ORDER — EPINEPHRINE 1 MG/ML
0.3 INJECTION, SOLUTION, CONCENTRATE INTRAVENOUS AS NEEDED
Status: CANCELLED | OUTPATIENT
Start: 2019-02-28

## 2019-02-20 RX ORDER — DIPHENHYDRAMINE HYDROCHLORIDE 50 MG/ML
50 INJECTION, SOLUTION INTRAMUSCULAR; INTRAVENOUS AS NEEDED
Status: CANCELLED
Start: 2019-03-01

## 2019-02-22 ENCOUNTER — HOSPITAL ENCOUNTER (EMERGENCY)
Age: 44
Discharge: HOME | End: 2019-02-22
Attending: EMERGENCY MEDICINE | Admitting: EMERGENCY MEDICINE
Payer: MEDICAID

## 2019-02-22 ENCOUNTER — OFFICE VISIT (OUTPATIENT)
Dept: ONCOLOGY | Age: 44
End: 2019-02-22

## 2019-02-22 ENCOUNTER — HOSPITAL ENCOUNTER (OUTPATIENT)
Dept: INFUSION THERAPY | Age: 44
End: 2019-02-22
Payer: MEDICAID

## 2019-02-22 ENCOUNTER — TELEPHONE (OUTPATIENT)
Dept: ONCOLOGY | Age: 44
End: 2019-02-22

## 2019-02-22 ENCOUNTER — HOSPITAL ENCOUNTER (OUTPATIENT)
Dept: INFUSION THERAPY | Age: 44
Discharge: HOME | End: 2019-02-22
Payer: MEDICAID

## 2019-02-22 ENCOUNTER — HOSPITAL ENCOUNTER (OUTPATIENT)
Dept: VASCULAR SURGERY | Age: 44
Discharge: HOME | End: 2019-02-22
Attending: NURSE PRACTITIONER
Payer: MEDICAID

## 2019-02-22 ENCOUNTER — HOSPITAL ENCOUNTER (OUTPATIENT)
Dept: ULTRASOUND IMAGING | Age: 44
Discharge: HOME | End: 2019-02-22
Attending: NURSE PRACTITIONER
Payer: MEDICAID

## 2019-02-22 ENCOUNTER — DOCUMENTATION ONLY (OUTPATIENT)
Dept: PALLATIVE CARE | Age: 44
End: 2019-02-22

## 2019-02-22 ENCOUNTER — APPOINTMENT (OUTPATIENT)
Dept: VASCULAR SURGERY | Age: 44
End: 2019-02-22
Attending: EMERGENCY MEDICINE
Payer: MEDICAID

## 2019-02-22 VITALS
OXYGEN SATURATION: 98 % | DIASTOLIC BLOOD PRESSURE: 80 MMHG | SYSTOLIC BLOOD PRESSURE: 113 MMHG | HEART RATE: 93 BPM | TEMPERATURE: 96 F | BODY MASS INDEX: 23.2 KG/M2 | WEIGHT: 147.8 LBS | HEIGHT: 67 IN

## 2019-02-22 VITALS
BODY MASS INDEX: 23.07 KG/M2 | OXYGEN SATURATION: 100 % | DIASTOLIC BLOOD PRESSURE: 67 MMHG | RESPIRATION RATE: 15 BRPM | HEART RATE: 88 BPM | WEIGHT: 147 LBS | HEIGHT: 67 IN | TEMPERATURE: 98.6 F | SYSTOLIC BLOOD PRESSURE: 113 MMHG

## 2019-02-22 DIAGNOSIS — C82.33 FOLLICULAR LYMPHOMA GRADE IIIA OF INTRA-ABDOMINAL LYMPH NODES (HCC): ICD-10-CM

## 2019-02-22 DIAGNOSIS — M79.89 LEFT LEG SWELLING: Primary | ICD-10-CM

## 2019-02-22 DIAGNOSIS — M79.89 LEFT LEG SWELLING: ICD-10-CM

## 2019-02-22 DIAGNOSIS — M79.89 LEG SWELLING: ICD-10-CM

## 2019-02-22 DIAGNOSIS — I72.4 PSEUDOANEURYSM OF FEMORAL ARTERY FOLLOWING PROCEDURE (HCC): Primary | ICD-10-CM

## 2019-02-22 DIAGNOSIS — C82.90 FOLLICULAR LYMPHOMA (HCC): ICD-10-CM

## 2019-02-22 DIAGNOSIS — T81.718A PSEUDOANEURYSM OF FEMORAL ARTERY FOLLOWING PROCEDURE (HCC): Primary | ICD-10-CM

## 2019-02-22 DIAGNOSIS — C82.23: ICD-10-CM

## 2019-02-22 DIAGNOSIS — I21.4 NSTEMI (NON-ST ELEVATED MYOCARDIAL INFARCTION) (HCC): ICD-10-CM

## 2019-02-22 DIAGNOSIS — I10 HYPERTENSION, UNSPECIFIED TYPE: ICD-10-CM

## 2019-02-22 DIAGNOSIS — C82.33 FOLLICULAR LYMPHOMA GRADE IIIA OF INTRA-ABDOMINAL LYMPH NODES (HCC): Primary | ICD-10-CM

## 2019-02-22 LAB
ANION GAP SERPL CALC-SCNC: 8 MMOL/L (ref 5–15)
APTT PPP: 27.8 SEC (ref 22.1–32)
BASOPHILS # BLD: 0.1 K/UL (ref 0–0.1)
BASOPHILS NFR BLD: 1 % (ref 0–1)
BUN SERPL-MCNC: 12 MG/DL (ref 6–20)
BUN/CREAT SERPL: 13 (ref 12–20)
CALCIUM SERPL-MCNC: 9.1 MG/DL (ref 8.5–10.1)
CHLORIDE SERPL-SCNC: 103 MMOL/L (ref 97–108)
CO2 SERPL-SCNC: 28 MMOL/L (ref 21–32)
CREAT SERPL-MCNC: 0.91 MG/DL (ref 0.7–1.3)
DIFFERENTIAL METHOD BLD: ABNORMAL
EOSINOPHIL # BLD: 0.3 K/UL (ref 0–0.4)
EOSINOPHIL NFR BLD: 4 % (ref 0–7)
ERYTHROCYTE [DISTWIDTH] IN BLOOD BY AUTOMATED COUNT: 16.3 % (ref 11.5–14.5)
GLUCOSE SERPL-MCNC: 100 MG/DL (ref 65–100)
HCT VFR BLD AUTO: 26.8 % (ref 36.6–50.3)
HGB BLD-MCNC: 9 G/DL (ref 12.1–17)
IMM GRANULOCYTES # BLD AUTO: 0 K/UL (ref 0–0.04)
IMM GRANULOCYTES NFR BLD AUTO: 0 % (ref 0–0.5)
INR PPP: 1.1 (ref 0.9–1.1)
LYMPHOCYTES # BLD: 1.6 K/UL (ref 0.8–3.5)
LYMPHOCYTES NFR BLD: 18 % (ref 12–49)
MCH RBC QN AUTO: 30.6 PG (ref 26–34)
MCHC RBC AUTO-ENTMCNC: 33.6 G/DL (ref 30–36.5)
MCV RBC AUTO: 91.2 FL (ref 80–99)
MONOCYTES # BLD: 1.2 K/UL (ref 0–1)
MONOCYTES NFR BLD: 13 % (ref 5–13)
NEUTS SEG # BLD: 5.9 K/UL (ref 1.8–8)
NEUTS SEG NFR BLD: 65 % (ref 32–75)
NRBC # BLD: 0 K/UL (ref 0–0.01)
NRBC BLD-RTO: 0 PER 100 WBC
PLATELET # BLD AUTO: 263 K/UL (ref 150–400)
PMV BLD AUTO: 10.4 FL (ref 8.9–12.9)
POTASSIUM SERPL-SCNC: 4.1 MMOL/L (ref 3.5–5.1)
PROTHROMBIN TIME: 10.8 SEC (ref 9–11.1)
RBC # BLD AUTO: 2.94 M/UL (ref 4.1–5.7)
SODIUM SERPL-SCNC: 139 MMOL/L (ref 136–145)
THERAPEUTIC RANGE,PTTT: NORMAL SECS (ref 58–77)
WBC # BLD AUTO: 9.1 K/UL (ref 4.1–11.1)

## 2019-02-22 PROCEDURE — 93971 EXTREMITY STUDY: CPT

## 2019-02-22 PROCEDURE — 93926 LOWER EXTREMITY STUDY: CPT

## 2019-02-22 PROCEDURE — 99285 EMERGENCY DEPT VISIT HI MDM: CPT

## 2019-02-22 PROCEDURE — 85610 PROTHROMBIN TIME: CPT

## 2019-02-22 PROCEDURE — 85025 COMPLETE CBC W/AUTO DIFF WBC: CPT

## 2019-02-22 PROCEDURE — 85730 THROMBOPLASTIN TIME PARTIAL: CPT

## 2019-02-22 PROCEDURE — 80048 BASIC METABOLIC PNL TOTAL CA: CPT

## 2019-02-22 PROCEDURE — 74011250636 HC RX REV CODE- 250/636: Performed by: EMERGENCY MEDICINE

## 2019-02-22 PROCEDURE — 36415 COLL VENOUS BLD VENIPUNCTURE: CPT

## 2019-02-22 RX ORDER — HEPARIN 100 UNIT/ML
300 SYRINGE INTRAVENOUS
Status: COMPLETED | OUTPATIENT
Start: 2019-02-22 | End: 2019-02-22

## 2019-02-22 RX ADMIN — Medication 300 UNITS: at 22:09

## 2019-02-22 NOTE — PROGRESS NOTES
Bartolo Brooke is a 41 y.o. male here for follow up of lymphoma. Patient with complaints of leg pain, rates as an 8 out of 10.

## 2019-02-22 NOTE — TELEPHONE ENCOUNTER
02/22/19 4:10 PM Verified ID x 2. Notified patient he needs to go to [facility] ED to have doppler of left lower extremity to rule out pseudoaneurysm. Patient had venous doppler today that ruled out DVT, but needs additional arterial doppler. Notified ED of patient's arrival. Patient verbalized understanding and is agreeable to this. No further questions at this time.

## 2019-02-22 NOTE — PROGRESS NOTES
[id number] [facility]  [phone]    Hematology / Oncology Established Visit    Reason for Visit:   Delilah Kaye is a 41 y.o. male who comes in for f/u of lymphoma. Hematology Oncology Treatment History:     Diagnosis: Follicular lymphoma    Stage: IV    Pathology:   11/13/18 right inguinal LN excision: Follicular lymphoma, high-grade (grade 3a of 3). Comment   The delaney architecture is entirely effaced by atypical lymphocytic proliferation with prominent nodular growth pattern. The majority of the atypical lymphocytes are small to medium in size and have irregular nuclear outlines and inconspicuous nucleoli, morphologically consistent with centrocytes. Scattered large lymphocytes with prominent nucleoli, consistent with centroblasts are identified (> 15 per high-power field). Focal increase in mitotic figures is noted. Occasional follicular dendritic cells are seen. By immunohistochemistry, the atypical lymphocytes are positive for CD20, PAX5, CD10, BCL6 and BCL2 (weak, focal) and negative for MUM1. CD3, CD5 and CD43 stain numerous background T lymphocytes. Ki-67 reveals a high proliferation index in the neoplastic follicles (overall 02-22%). Clinical history indicates 14 cm retroperitoneal mass with bilateral inguinal lymphadenopathy. In summary, the combined morphologic and phenotypic findings are diagnostic of a high-grade follicular lymphoma (grade 3a of 3). There is no evidence of diffuse large B-cell lymphoma. Flow cytometry analysis:   Monoclonal B-cell population (47 % of all cells) with mild increase in side and forward scatter properties expressing CD19, CD20, CD23 and CD10 with surface lambda light chain restriction. No phenotypically aberrant T-cell population. Flow cytometry was performed at [facility]     Prior Treatment: Obinutuzumab-CHOP.  Obinutuzumab: 1000 mg weekly on days 1, 8, 15 for cycle 1, then 1000 mg on day 1 q21 days for cycles 2-6, then monotherapy 1000 mg every 21 days for cycle 7, 8 with Cyclophosphamide 750mg/m2, Doxorubicin 50mg/m2, Vincristine 1.4mg/m2 on day 1 and Prednisone 100mg on Days 1-5, every 21 days for a total of 2 cycles completed late 1/2019. Regimen discontinued due to NSTEMI. Current Treatment: Obinutuzumab + Bendamustine: 1000 mg Obinutuzumab + bendamustine on day 1, then 162.5 mg Bendamustine on day 2 for 4 cycles every 28 days. History of Present Illness:   Mr. Zapata is a 42 y/o male with HTN who comes in for evaluation of Follicular lymphoma. He states he was in his usual state of health in early November 2018, but then he developed low back pain and presented to the ER. The pain was described as constant, radiating to the buttocks, without exacerbating or alleviating factors, associated w/ increased urination, no n/v/d, no dysuria, no fever, he's unsure about weight loss. Work-up at outside hospital revealed a retroperitoneal mass seen on CT imaging, and he was transferred to [facility] for further work-up. CT there showed a large retroperitoneal mass encircling the aorta with invasion of the left renal hilum and left adrenal gland. There were bilateral inguinal lymph nodes and moderate left hydronephrosis. He was evaluated while at [facility] and was noted to have palpable nodes in his groin for approximately the past 1 month. No testicular mass, anorexia, weight loss, fevers, night sweats, chest pain, shortness of breath, abdominal pain or nausea. He underwent excisional LN biopsy of right inguinal LN. He was told that he had likely lymphoma. He was advised to follow up as outpatient for PET scan, bone marrow biopsy. However, patient states he was lost to f/u. He never received any calls or appointment details. His aunt urged patient to seek care elsewhere and his PCP recommended [facility].  At our first meeting on 12/13/18, he tells me that he was working full time, feeling well until early Nov 2018. When he developed the left lower back pain, he went to [facility] and [facility]. No fevers, chills, night sweats. He has lost 15 lbs in the past month due to low appetite. No n/v/d. No current constipation. No CP, SOB. No h/o cardiac disease aside from HTN, Hyperlipiemia. No hematochezia/melena, hematuria. He has HTN and XOL, which he was taking meds for, but has run out. Has no PCP currently. He is uninsured. He works as a cook, currently working part time. He has children aged 12 and 13. Interval history: Today, patient comes in for follow up and discussion of his new chemotherapy regimen. Patient suffered a cardiac arrest on 2/14/19, was found to have NSTEMI with 95% occlusion of LAD s/p TOM placement. He was started on ASA, Plavix, BB, ACEI, continued on his statin. Denies any current chest pain/SOB. Denies n/v, constipation or diarrhea. He denies any recent fevers, chills, sweats, CP, SOB. He has a dry cough occasionally. Still has the lower back pain but reports improvement and only taking oxycodone daily to TID. His appetite has improved, eating at least 2 meals a day. Weight is steady at 146 lb has gained 2 lb. He reports left groin pain where the cath procedure was performed, started after discharge. Describes the pain as 8/10 and throbbing. FAMILY HISTORY:  His father has a history of leukemia, currently in remission, treated at [facility]. LYMPHATICS:  Bilateral palpable inguinal adenopathy. Past Medical History:   Diagnosis Date    Anxiety     Hyperlipidemia     Hypertension     Lymphadenopathy 11/12/2018      No past surgical history on file.    Social History     Tobacco Use    Smoking status: Current Every Day Smoker     Packs/day: 1.00     Years: 20.00     Pack years: 20.00    Smokeless tobacco: Never Used   Substance Use Topics    Alcohol use: No     Frequency: Never      Family History   Problem Relation Age of Onset    Hypertension Father     Diabetes Mother      Current Outpatient Medications   Medication Sig    oxyCODONE IR (ROXICODONE) 5 mg immediate release tablet Take 1 Tab by mouth every four (4) hours as needed for Pain. Max Daily Amount: 30 mg.    ALPRAZolam (XANAX) 1 mg tablet Take 1 Tab by mouth three (3) times daily as needed for Anxiety. Max Daily Amount: 3 mg.  escitalopram oxalate (LEXAPRO) 10 mg tablet Take 1 Tab by mouth daily.  atorvastatin (LIPITOR) 80 mg tablet Take 1 Tab by mouth nightly.  clopidogrel (PLAVIX) 75 mg tab 1 Tab by Per NG tube route daily.  metoprolol succinate (TOPROL-XL) 25 mg XL tablet Take 1 Tab by mouth daily.  predniSONE (DELTASONE) 20 mg tablet Take 100 mg by mouth daily. Take on Days 1 through 5 each cycle.  lisinopril (PRINIVIL, ZESTRIL) 10 mg tablet Take 1 Tab by mouth daily. DO NOT TAKE for 5 days    L. acidoph & paracasei- S therm- Bifido (AUSTIN-Q/RISAQUAD) 8 billion cell cap cap Take 1 Cap by mouth daily.  magic mouthwash solution Take 15-30 mL by mouth four (4) times daily. Magic mouth wash   Maalox  Lidocaine 2% viscous   Diphenhydramine oral solution    Swish and spit for mouth pain, ok to swallow for throat pain     Pharmacy to mix equal portions of ingredients to a total volume as indicated in the dispense amount.  prochlorperazine (COMPAZINE) 10 mg tablet Take 1 Tab by mouth every six (6) hours as needed.  senna-docusate (PERICOLACE) 8.6-50 mg per tablet Take 1 Tab by mouth daily.  ondansetron hcl (ZOFRAN) 8 mg tablet Take 1 Tab by mouth every eight (8) hours as needed for Nausea.  predniSONE (DELTASONE) 20 mg tablet Take 100 mg by mouth daily (with breakfast). (on days 1-5 of each chemo cycle)    naloxone (NARCAN) 4 mg/actuation nasal spray Use 1 spray intranasally, then discard. Repeat with new spray every 2 min as needed for opioid overdose symptoms, alternating nostrils.  aspirin delayed-release (ASPIR-81) 81 mg tablet Take 1 Tab by mouth daily.      No current facility-administered medications for this visit. No Known Allergies     Review of Systems: A complete review of systems was obtained, negative except as described above. Physical Exam:     Visit Vitals  /80 (BP 1 Location: Left arm, BP Patient Position: Sitting)   Pulse 93   Temp 96 °F (35.6 °C) (Temporal)   Ht 5' 7\" (1.702 m)   Wt 147 lb 12.8 oz (67 kg)   SpO2 98%   BMI 23.15 kg/m²     ECOG PS: 0  General: Well developed, no acute distress  Eyes: PERRLA, EOMI, anicteric sclerae  HENT: Atraumatic, OP clear, poor dentition, multiple dental caries, no erythema,TMs intact without erythema  Neck: Supple  Lymphatic: No supraclavicular, axillary. R cervical 2cm LN absent. Bilateral small 2-3cm palpable inguinal adenopathy  Respiratory: CTAB, normal respiratory effort  CV: Normal rate, regular rhythm, no murmurs, no peripheral edema  GI: Soft, nontender, nondistended, no masses, no hepatomegaly, no splenomegaly  MS: Normal gait and station. Digits without clubbing or cyanosis. TTP in left lower back  Skin: No rashes, ecchymoses, or petechiae. Normal temperature, turgor, and texture. Large hematoma present on inner left thigh and along groin, firm to palpation. Neuro/Psych: Alert, oriented. 5/5 strength in all 4 extremities. Appropriate affect, normal judgment/insight. Results:     Lab Results   Component Value Date/Time    WBC 10.5 02/17/2019 04:52 AM    HGB 9.4 (L) 02/17/2019 04:52 AM    HCT 28.8 (L) 02/17/2019 04:52 AM    PLATELET 140 72/93/4723 04:52 AM    MCV 93.8 02/17/2019 04:52 AM    ABS.  NEUTROPHILS 7.3 02/16/2019 05:04 AM    Hemoglobin (POC) 15.0 06/05/2009 02:13 PM    Hematocrit (POC) 39 02/14/2019 01:24 PM     Lab Results   Component Value Date/Time    Sodium 143 02/17/2019 04:52 AM    Potassium 4.0 02/17/2019 04:52 AM    Chloride 112 (H) 02/17/2019 04:52 AM    CO2 23 02/17/2019 04:52 AM    Glucose 113 (H) 02/17/2019 04:52 AM    BUN 9 02/17/2019 04:52 AM    Creatinine 0.87 02/17/2019 04:52 AM    GFR est AA >60 2019 04:52 AM    GFR est non-AA >60 2019 04:52 AM    Calcium 8.7 2019 04:52 AM    Sodium (POC) 136 2019 01:24 PM    Potassium (POC) 3.9 2019 01:24 PM    Chloride (POC) 102 2019 01:24 PM    Glucose (POC) 249 (H) 02/15/2019 10:21 PM    BUN (POC) 14 2019 01:24 PM    Creatinine (POC) 0.9 2019 01:24 PM    Calcium, ionized (POC) 1.24 2019 01:24 PM     Lab Results   Component Value Date/Time    Bilirubin, total 0.2 02/15/2019 02:15 AM    ALT (SGPT) 26 02/15/2019 02:15 AM    AST (SGOT) 21 02/15/2019 02:15 AM    Alk. phosphatase 90 02/15/2019 02:15 AM    Protein, total 5.5 (L) 02/15/2019 02:15 AM    Albumin 2.9 (L) 02/15/2019 02:15 AM    Globulin 2.6 02/15/2019 02:15 AM     No results found for: IRON, FE, TIBC, IBCT, PSAT, FERR    No results found for: B12LT, FOL, RBCF  Lab Results   Component Value Date/Time    TSH 1.53 2016 04:40 AM     18:     Lab Results   Component Value Date/Time    Hepatitis A, IgM NONREACTIVE 2018 04:53 PM    Hepatitis B surface Ag <0.10 2018 04:53 PM    Hepatitis B core, IgM NONREACTIVE 2018 04:53 PM         Imagin/9/18 Abd/pelvis CT: IMPRESSION:  1. Interval development of a large retroperitoneal mass encircling the aorta with invasion of the left renal hilum and left adrenal gland. Several adjacent lymph nodes are seen extending into the peritoneum and underneath the  diaphragmatic natalie. This most likely represents lymphoma. 2. Several new bilateral enlarged inguinal lymph nodes also likely representing lymphoma. 3. Moderate left hydronephrosis with a delayed renal nephrogram related to decreased renal function. This is related to the invasion of the renal hilum. 18 Chest CT: IMPRESSION:  Trace left pleural effusion. Bilateral lower lobe atelectasis. Large  retroperitoneal mass lesion again demonstrated.     PET 18:  FINDINGS:  HEAD/NECK: Right palatine tonsil intense hypermetabolism, max SUV 18. Multilevel  bilateral cervical adenopathy, with max SUV 12 in a left supraclavicular node. Cerebral evaluation is limited by normal intense activity. CHEST: Solitary hypermetabolic left axillary node, max SUV 11. ABDOMEN/PELVIS: Bulky retroperitoneal mass max SUV 27, with several additional  small active abdomino-pelvic nodes. Bilateral inguinal nodes with max SUV 12 on  the left. SKELETON: No foci of abnormal hypermetabolism in the axial and visualized  appendicular skeleton. IMPRESSION:   1. Right palatine tonsil tumor involvement (Deauville 5). 2. Bilateral cervical delaney involvement (Deauville 5). 3. Left axillary node involvement (Deauville 5). 4. Bulky retroperitoneal lymphoma mass and additional smaller hypermetabolic  abdomino-pelvic nodes (Deauville 5). 5. Bilateral inguinal delaney involvement (Deauville 5). Deauville Five Point Scale  1. No uptake or no residual uptake (when used interim)  2. Slight uptake, but below blood pool (mediastinum)  3. Uptake above mediastinal, but below/equal to uptake in the liver  4. Uptake slightly to moderately higher than liver  5. Markedly increased uptake or any new lesion (on response evaluation)  Each FDG-avid (or previously FDG avid) lesion is rated independently. Reference values:  Mediastinal blood pool: 2.1 SUV  Liver (background): 2.2 SUV    PET/CT 2/05/19:   IMPRESSION:  1. No Foci of Abnormal Hypermetabolism (Deauville 1). 2. Resolved activity in the right palatine tonsil, bilateral cervical nodes,left axillary node, retroperitoneal/abdominal pelvic adenopathy, bilateral inguinal nodes. Echo 2/14/19:  Normal cavity size, wall thickness and systolic function (ejection fraction normal). The muscle mass is normal. The cavity shape is normal. The estimated ejection fraction is 41 - 45%. Abnormal wall motion as described on the wall scoring diagram below. End-systolic volume is normal. Normal left ventricular strain.  There is mild (grade 1) left ventricular diastolic dysfunction. Normal left ventricular diastolic pressure. End-diastolic volume is normal.    Assessment & Plan:   Parker Adjutant is a 41 y.o. male comes in for evaluation and management of lymphoma. 1. Follicular lymphoma: Grade 3a. Although grade 3a disease is considered more indolent and can be treated like grade 1/2 disease, this patient has indications for treatment: bulky disease encircling the aorta causing symptoms. Bone marrow negative for lymphoma, but was hypercellular. BR better than RCHOP, but based on GALLIUM study, Obinutuzumab-based induction and maintenance prolongs PFS over that seen with rituximab-based therapy. Therefore, I have chosen to treat patient with O-CHOP regimen for 6 cycles, followed by possible maintenance Obinutuzumab. We discussed the risks and benefits of O-CHOP chemotherapy. The potential side effects include, but are not limited to: nausea, vomiting, diarrhea, constipation, Flu-like symptoms, infusion reaction, allergic reaction, flushing, taste changes, increased risk of infection, anemia, fatigue, alopecia, neuropathy, nail changes, cardiac damage, mucositis, myleosuppression, infertility and rarely, death. Patient completed chemoteaching and received a chemotherapy education folder. CR after 2 cycles of O-CHOP, but switched regiment Bendamustine-O due to cardiac event. Chemotherapy consent signed and patient educated about side effects including: cytopenias, infections, bleeding, n/v/d, hair loss, skin rash, secondary malignancy, kidney or liver toxicity. -- Return on 2/28/19 for cycle 1, day 1 of Bendamustine-Obinutuzumab. He will remain in this regimen every 4 weeks for a total of 4 cycles. -- Neulasta on body today on day 2 of regimen  -- Discussed new regimen and filled out chemotherapy consent form. -- Start cycle 1, day 1,  labs, MD, chemo 2/28/19, Cycle 1 Day 2 on 2/29/18.     2. Use of cardiotoxic chemotherapy: Discussed risks/benefits of using doxorubicin. Echo on 12/17/18 showed EF 65%, but drop to EF 45% immediately after MI.  -- Patient will f/u with Dr. Luna Kumar in Cardiology at [facility]. 3. Neoplasm related pain / Anxiety: Left lower back pain. Taking Oxycodone 5mg bid prn. Signed pain contract on 12/28/18. Counseled on adding SSRI if anxiety becomes worse. -- Continue Oxycodone 5mg only twice a day, 80 tabs refilled 1/24/19  -- Ativan 0.5mg bid prn, 60 tabs written 1/24/18  -- Follow up with [facility] on 3/19/19   -- Started on Lexapro 10 mg daily for anxiety. 4. HTN / Hyperlipidemia: Well controlled today on BB, ACEI. 5. Tobacco abuse: Reiterated strong recommendation for smoking cessation in the setting of recent MI. Discussed cessation strategies and pt does not want Wellbutrin given past experience with it. Still smoking a pack per day. 6. Dental infection: Caused neutropenic fever and hospital admission recently. No abscess. Blood cultures negative. Treated with IV antibiotics followed by Augmentin. -- Dental evaluation vs OMFS recommended. Amos Levin in [address] met with him about affordable dental care options. -- Unable to afford dental care at this time. 7. H/o STEMI / Cardiac arrest: P/w CP on 2/14/19 followed by collapse and cardiac arrest. Cardiac cath at [facility] by Dr. Luna Kumar revealed 90-95% occlusion of proximal LAD, TOM placed. -- On dual antiplatelet therapy aspirin and clopidogrel  -- On BB, ACEI, statin      8. Left groin induration and tenderness: 10cm region of induration without fluctuance. Significant bruising tracking down left thigh. Discussed with Dr. Luna Kumar ([phone]) who recommends supportive care unless pseudoaneurysm identified on ultrasound. -- Ultrasound of left leg ordered. -- Recheck CMP and CBC at [facility].     Emotional well being: Pt is coping well with his/her disease and has excellent support.   Met with SW.    I appreciate the opportunity to participate in Mr. Louis Bruce care.     Signed By: Monique Epstein MD     February 22, 2019

## 2019-02-22 NOTE — ED PROVIDER NOTES
41 y.o. male with past medical history significant for HTN, hyperlipidemia, anxiety, lymphadenopathy, and non-hodgkin's lymphoma who presents from home with chief complaint of groin pain. Pt reports L-sided groin pain for 4 days after a cardiac catheterization for MI 8 days ago. He states a significant amount of his L leg is now \"purple\". Pt notes he had an ultrasound study earlier today but \"it was incomplete\", so he was recommended ED evaluation to r/o blood clot. He states he was initially evaluated here 8 days ago, but was transferred to [facility] for stent placement. There are no other acute medical concerns at this time.     Note written by Elidia Khan, as dictated by Margie Stallings MD 5:04 PM             Past Medical History:   Diagnosis Date    Anxiety     Hyperlipidemia     Hypertension     Lymphadenopathy 11/12/2018       Past Surgical History:   Procedure Laterality Date    HX HEART CATHETERIZATION  02/2019    HX OTHER SURGICAL  02/2019    cardiac stent         Family History:   Problem Relation Age of Onset    Hypertension Father     Diabetes Mother        Social History     Socioeconomic History    Marital status: SINGLE     Spouse name: Not on file    Number of children: Not on file    Years of education: Not on file    Highest education level: Not on file   Social Needs    Financial resource strain: Not on file    Food insecurity - worry: Not on file    Food insecurity - inability: Not on file   [employer] needs - medical: Not on file   [employer] needs - non-medical: Not on file   Occupational History    Not on file   Tobacco Use    Smoking status: Current Every Day Smoker     Packs/day: 1.00     Years: 20.00     Pack years: 20.00    Smokeless tobacco: Never Used   Substance and Sexual Activity    Alcohol use: No     Frequency: Never    Drug use: No    Sexual activity: Yes   Other Topics Concern    Not on file   Social History Narrative    Not on file ALLERGIES: Patient has no known allergies. Review of Systems   Constitutional: Negative for chills and fever. HENT: Negative for ear pain and sore throat. Eyes: Negative for pain. Respiratory: Negative for chest tightness and shortness of breath. Cardiovascular: Negative for chest pain and leg swelling. Gastrointestinal: Negative for abdominal pain, nausea and vomiting. Genitourinary: Negative for dysuria and flank pain. Musculoskeletal: Positive for myalgias. Negative for back pain. Skin: Positive for color change. Negative for rash. Neurological: Negative for headaches. All other systems reviewed and are negative. Vitals:    02/22/19 1701   BP: 133/77   Pulse: 98   Resp: 16   Temp: 98.6 °F (37 °C)   SpO2: 100%   Weight: 66.7 kg (147 lb)   Height: 5' 7\" (1.702 m)            Physical Exam   Constitutional: No distress. HENT:   Head: Normocephalic and atraumatic. Eyes: Conjunctivae are normal. Pupils are equal, round, and reactive to light. No scleral icterus. Neck: No tracheal deviation present. Cardiovascular: Normal rate and regular rhythm. Palpable L groin thrill. DP pulse 2+ bilaterally. Pulmonary/Chest: Effort normal and breath sounds normal. No stridor. No respiratory distress. Abdominal: Soft. He exhibits no distension. There is no tenderness. Musculoskeletal: He exhibits no edema or deformity. Neurological: He is alert. Skin: Skin is warm and dry. Large hematoma over L groin w/ a large amount of ecchymosis ranging to L thigh. Small amount of ecchymosis over R groin, no hematoma. Psychiatric: He has a normal mood and affect. Nursing note and vitals reviewed.   Note written by Elidia Shine, as dictated by Irl MD Christine 5:04 PM    MDM   diff dx: hematoma versus pseudoaneurysm   - offered admission for IR consult for thrombin injection  - elected outpt management with Cardiology  - given strict return precautions    Procedures         CONSULT NOTE:  8:21 PM Braulio Luu MD spoke with Dr. Nav Mendoza, Consult for Cardiology. Discussed available diagnostic tests and clinical findings including pseudoaneurysm. Dr. Nav Mendoza recommends consulting vascular surgery for pseudoaneurysm management. CONSULT NOTE:  8:57 PM Braulio Luu MD spoke with Dr. Harish Dalton for Vascular Surgery. Discussed available diagnostic tests and clinical findings. Dr. Roderick Quintanilla recommends offering admission for IR to inject thrombin into Pt's pseudoaneurysm. Shirley Cerda.  Isrrale Garza MD

## 2019-02-23 ENCOUNTER — TELEPHONE (OUTPATIENT)
Dept: ONCOLOGY | Age: 44
End: 2019-02-23

## 2019-02-23 LAB
ALBUMIN SERPL-MCNC: 4.3 G/DL (ref 3.5–5.5)
ALBUMIN/GLOB SERPL: 1.7 {RATIO} (ref 1.2–2.2)
ALP SERPL-CCNC: 99 IU/L (ref 39–117)
ALT SERPL-CCNC: 16 IU/L (ref 0–44)
AST SERPL-CCNC: 13 IU/L (ref 0–40)
BASOPHILS # BLD AUTO: 0.1 X10E3/UL (ref 0–0.2)
BASOPHILS NFR BLD AUTO: 1 %
BILIRUB SERPL-MCNC: 0.5 MG/DL (ref 0–1.2)
BUN SERPL-MCNC: 12 MG/DL (ref 6–24)
BUN/CREAT SERPL: 13 (ref 9–20)
CALCIUM SERPL-MCNC: 9.5 MG/DL (ref 8.7–10.2)
CHLORIDE SERPL-SCNC: 101 MMOL/L (ref 96–106)
CO2 SERPL-SCNC: 23 MMOL/L (ref 20–29)
CREAT SERPL-MCNC: 0.92 MG/DL (ref 0.76–1.27)
EOSINOPHIL # BLD AUTO: 0.4 X10E3/UL (ref 0–0.4)
EOSINOPHIL NFR BLD AUTO: 3 %
ERYTHROCYTE [DISTWIDTH] IN BLOOD BY AUTOMATED COUNT: 17.6 % (ref 12.3–15.4)
GLOBULIN SER CALC-MCNC: 2.5 G/DL (ref 1.5–4.5)
GLUCOSE SERPL-MCNC: 96 MG/DL (ref 65–99)
HCT VFR BLD AUTO: 32.1 % (ref 37.5–51)
HGB BLD-MCNC: 10.6 G/DL (ref 13–17.7)
IMM GRANULOCYTES # BLD AUTO: 0 X10E3/UL (ref 0–0.1)
IMM GRANULOCYTES NFR BLD AUTO: 0 %
LEFT ABI: 1.02
LEFT ANTERIOR TIBIAL: 110 MMHG
LEFT CFA DIST SYS PSV: 157 CM/S
LEFT CFA PROX SYS PSV: 175 CM/S
LEFT POSTERIOR TIBIAL: 118 MMHG
LEFT PROX PFA A PSV: 50 CM/S
LEFT SFA DIST VEL RATIO: 0.71
LEFT SFA MID VEL RATIO: 0.66
LEFT SFA PROX VEL RATIO: 0.69
LEFT SUPER FEMORAL DIST SYS PSV: 57 CM/S
LEFT SUPER FEMORAL MID SYS PSV: 80 CM/S
LEFT SUPER FEMORAL PROX SYS PSV: 121 CM/S
LYMPHOCYTES # BLD AUTO: 2 X10E3/UL (ref 0.7–3.1)
LYMPHOCYTES NFR BLD AUTO: 16 %
Lab: 0.63 CM
Lab: 2.32 CM
Lab: 2.58 CM
MCH RBC QN AUTO: 30.1 PG (ref 26.6–33)
MCHC RBC AUTO-ENTMCNC: 33 G/DL (ref 31.5–35.7)
MCV RBC AUTO: 91 FL (ref 79–97)
MONOCYTES # BLD AUTO: 1.5 X10E3/UL (ref 0.1–0.9)
MONOCYTES NFR BLD AUTO: 12 %
NEUTROPHILS # BLD AUTO: 8.2 X10E3/UL (ref 1.4–7)
NEUTROPHILS NFR BLD AUTO: 68 %
PLATELET # BLD AUTO: 327 X10E3/UL (ref 150–379)
POTASSIUM SERPL-SCNC: 4.5 MMOL/L (ref 3.5–5.2)
PROT SERPL-MCNC: 6.8 G/DL (ref 6–8.5)
RBC # BLD AUTO: 3.52 X10E6/UL (ref 4.14–5.8)
RIGHT ABI: 1.03
RIGHT ANTERIOR TIBIAL: 120 MMHG
RIGHT ARM BP: 116 MMHG
RIGHT POSTERIOR TIBIAL: 110 MMHG
SODIUM SERPL-SCNC: 139 MMOL/L (ref 134–144)
WBC # BLD AUTO: 12.2 X10E3/UL (ref 3.4–10.8)

## 2019-02-23 NOTE — DISCHARGE INSTRUCTIONS
Patient Education        Pseudo-Aneurysm: Care Instructions  Your Care Instructions  A pseudo-aneurysm is a hole in the wall of a blood vessel (artery). The blood from inside the artery can then leak out of the artery. It is most common in the artery that runs from the hip to the knee. It can be caused by the puncture of an artery during a medical test, such as certain heart tests. People who take blood thinners are more likely to develop a pseudo-aneurysm. Some pseudo-aneurysms heal without treatment. Those that continue to grow larger may need treatment. The doctor may inject medicine through a needle to make the blood clot in the pseudo-aneurysm. He or she may also use pressure (compression) to make the blood clot. If these do not work, you may need surgery to repair the hole in the artery. Follow-up care is a key part of your treatment and safety. Be sure to make and go to all appointments, and call your doctor if you are having problems. It's also a good idea to know your test results and keep a list of the medicines you take. How can you care for yourself at home? · Control high blood pressure. High blood pressure increases the chance of an aneurysm bursting. A healthy lifestyle along with medicines may help you lower your blood pressure. · Manage cholesterol to help keep your blood vessels healthy. A healthy lifestyle along with medicines may help you manage cholesterol. · Do not smoke. If you need help quitting, talk to your doctor about stop-smoking programs and medicines. These can increase your chances of quitting for good. · Eat heart-healthy foods. These include fruits, vegetables, whole grains, fish, and low-fat or nonfat dairy foods. Limit sodium, alcohol, and sweets. · Stay at a healthy weight. Being overweight may not make the pseudo-aneurysm any worse. But being at a healthy weight can reduce your risks from surgery if you need it. When should you call for help?   Call 911 anytime you think you may need emergency care. For example, call if:    · You have sudden chest pain and shortness of breath, or you cough up blood.     · You passed out (lost consciousness).    Call your doctor now or seek immediate medical care if:    · You have sudden or increasing pain in your groin.     · Your leg or foot is cool or pale or changes color.     · You have tingling or numbness in your foot.    Watch closely for changes in your health, and be sure to contact your doctor if:    · You do not get better as expected. Where can you learn more? Go to http://manisha-anne marie.info/. Enter Q112 in the search box to learn more about \"Pseudo-Aneurysm: Care Instructions. \"  Current as of: September 26, 2018  Content Version: 11.9  © 2190-4707 [employer]. Care instructions adapted under license by [employer] (which disclaims liability or warranty for this information). If you have questions about a medical condition or this instruction, always ask your healthcare professional. [address] any warranty or liability for your use of this information.

## 2019-02-23 NOTE — TELEPHONE ENCOUNTER
Pt called from waiting room of ED complaining that someone told him he did not need another US.   Advised to wait and speak with the treatment team once he was actually seen in the ED

## 2019-02-25 ENCOUNTER — TELEPHONE (OUTPATIENT)
Dept: ONCOLOGY | Age: 44
End: 2019-02-25

## 2019-02-26 ENCOUNTER — APPOINTMENT (OUTPATIENT)
Dept: VASCULAR SURGERY | Age: 44
End: 2019-02-26
Attending: INTERNAL MEDICINE
Payer: MEDICAID

## 2019-02-26 ENCOUNTER — HOSPITAL ENCOUNTER (OUTPATIENT)
Dept: INTERVENTIONAL RADIOLOGY/VASCULAR | Age: 44
Setting detail: OBSERVATION
Discharge: HOME | End: 2019-02-26
Attending: INTERNAL MEDICINE | Admitting: INTERNAL MEDICINE
Payer: MEDICAID

## 2019-02-26 VITALS
HEIGHT: 67 IN | WEIGHT: 146 LBS | BODY MASS INDEX: 22.91 KG/M2 | RESPIRATION RATE: 15 BRPM | OXYGEN SATURATION: 98 % | HEART RATE: 77 BPM | SYSTOLIC BLOOD PRESSURE: 115 MMHG | TEMPERATURE: 97.9 F | DIASTOLIC BLOOD PRESSURE: 60 MMHG

## 2019-02-26 DIAGNOSIS — I72.9 PSEUDOANEURYSM (HCC): ICD-10-CM

## 2019-02-26 PROCEDURE — 99218 HC RM OBSERVATION: CPT

## 2019-02-26 PROCEDURE — 74011000250 HC RX REV CODE- 250: Performed by: RADIOLOGY

## 2019-02-26 PROCEDURE — 36002 PSEUDOANEURYSM INJECTION TRT: CPT

## 2019-02-26 PROCEDURE — 93926 LOWER EXTREMITY STUDY: CPT

## 2019-02-26 PROCEDURE — 77030003629 HC NDL PERC VASC COOK -A

## 2019-02-26 RX ADMIN — THROMBIN, TOPICAL (BOVINE) 900 UNITS: KIT at 12:09

## 2019-02-26 NOTE — PROGRESS NOTES
1130 am- Patient to Angio holding for pseudo aneursym treatment. Waiting for Dr. Adrienne Pisano to talk with patient about procedure. 1150 am- Dr. Yuli Humphrey in to talk with patient about procedure. Pt. Evaluated and assessed for procedure by Dr. Yuli Humphrey. Consent signed.

## 2019-02-26 NOTE — DISCHARGE INSTRUCTIONS
[facility]  Interventional Radiology      Physician:  _______________________________________________    Date:   _____2/26/19__________________________________________      Pseudoaneurysm Thrombin Injection    It is best to have a friend or family member stay with you for the next 24 hours    You may return to full activity in 24 to 48 hours. Resume your previous diet and follow your medication reconciliation form. The physician has injected a medication to clot off the aneurysm in your groin. Some bleeding could occur from the puncture site during the next 48 hours. Limit your walking and do not engage in any vigorous physical activity for the next 48 hours. Please come to the Emergency Department immediately if you have SEVERE pain, numbness, tingling or change in color of your leg. You should have a pulse you can feel on the top of your foot and behind your ankle. If you see any bleeding from the puncture site, apply pressure to the area immediately and ask for assistance. Hold pressure for at least 15 minutes. If bleeding does not stop or the puncture site becomes swollen have someone take you to the nearest medical facility immediately. CONTINUE TO HOLD PRESSURE TO THE PUNCTURE SITE. Tarik Sullivan For any other questions related to your procedure, call [phone] - 24/7 and ask to speak with a nurse or physician    Return tomorrow at 56 AM  for a repeat US to check your right groin pseudoaneurysm. [facility] [place] [phone] for outpatient Vascular lab.

## 2019-02-26 NOTE — PROGRESS NOTES
Patient received thrombin injection for left groin pseudoaneurysm and Dr. Salvador Uribe requested patient to remain in hospital for 23 hour observation. At this time, no bed assigned, hospital census full, patient okay to be discharged home with instructions to return tomorrow at 10:30 for followup vascular study. Dressing over left groin dry and intact, area firm. Reviewed discharge instructions with good understanding. Escorted to vehicle via wheelchair transport.

## 2019-02-27 ENCOUNTER — HOSPITAL ENCOUNTER (OUTPATIENT)
Dept: VASCULAR SURGERY | Age: 44
Discharge: HOME | End: 2019-02-27
Attending: NURSE PRACTITIONER | Admitting: INTERNAL MEDICINE
Payer: MEDICAID

## 2019-02-27 DIAGNOSIS — M79.89 LEFT LEG SWELLING: ICD-10-CM

## 2019-02-27 DIAGNOSIS — C82.33 FOLLICULAR LYMPHOMA GRADE IIIA OF INTRA-ABDOMINAL LYMPH NODES (HCC): ICD-10-CM

## 2019-02-27 LAB
LEFT CFA DIST SYS PSV: 164 CM/S
LEFT DIST ATA VELOCITY: 59 CM/S
LEFT DIST PTA PSV: 64 CM/S
LEFT POPLITEAL DIST SYS PSV: 48 CM/S
LEFT PROX PFA A PSV: 91 CM/S
LEFT SFA PROX VEL RATIO: 0.6
LEFT SUPER FEMORAL DIST SYS PSV: 67 CM/S
LEFT SUPER FEMORAL PROX SYS PSV: 99 CM/S

## 2019-02-27 PROCEDURE — 93926 LOWER EXTREMITY STUDY: CPT

## 2019-02-28 ENCOUNTER — HOSPITAL ENCOUNTER (OUTPATIENT)
Dept: INFUSION THERAPY | Age: 44
Discharge: HOME | End: 2019-02-28
Payer: MEDICAID

## 2019-02-28 ENCOUNTER — DOCUMENTATION ONLY (OUTPATIENT)
Dept: ONCOLOGY | Age: 44
End: 2019-02-28

## 2019-02-28 ENCOUNTER — OFFICE VISIT (OUTPATIENT)
Dept: ONCOLOGY | Age: 44
End: 2019-02-28

## 2019-02-28 VITALS
RESPIRATION RATE: 16 BRPM | OXYGEN SATURATION: 100 % | WEIGHT: 145.4 LBS | HEART RATE: 70 BPM | SYSTOLIC BLOOD PRESSURE: 106 MMHG | HEIGHT: 67 IN | BODY MASS INDEX: 22.82 KG/M2 | DIASTOLIC BLOOD PRESSURE: 67 MMHG | TEMPERATURE: 97.9 F

## 2019-02-28 VITALS
OXYGEN SATURATION: 100 % | TEMPERATURE: 97.8 F | WEIGHT: 145.4 LBS | HEIGHT: 67 IN | BODY MASS INDEX: 22.82 KG/M2 | HEART RATE: 90 BPM | SYSTOLIC BLOOD PRESSURE: 107 MMHG | DIASTOLIC BLOOD PRESSURE: 68 MMHG

## 2019-02-28 DIAGNOSIS — Z51.11 CHEMOTHERAPY MANAGEMENT, ENCOUNTER FOR: ICD-10-CM

## 2019-02-28 DIAGNOSIS — Z71.6 TOBACCO ABUSE COUNSELING: ICD-10-CM

## 2019-02-28 DIAGNOSIS — C82.33 FOLLICULAR LYMPHOMA GRADE IIIA OF INTRA-ABDOMINAL LYMPH NODES (HCC): Primary | ICD-10-CM

## 2019-02-28 DIAGNOSIS — I10 HYPERTENSION, UNSPECIFIED TYPE: ICD-10-CM

## 2019-02-28 DIAGNOSIS — I21.4 NSTEMI (NON-ST ELEVATED MYOCARDIAL INFARCTION) (HCC): ICD-10-CM

## 2019-02-28 DIAGNOSIS — C82.90 FOLLICULAR LYMPHOMA, UNSPECIFIED FOLLICULAR LYMPHOMA TYPE, UNSPECIFIED BODY REGION (HCC): Primary | ICD-10-CM

## 2019-02-28 DIAGNOSIS — F41.9 ANXIETY: ICD-10-CM

## 2019-02-28 DIAGNOSIS — T81.718A PSEUDOANEURYSM FOLLOWING PROCEDURE (HCC): ICD-10-CM

## 2019-02-28 DIAGNOSIS — I72.9 PSEUDOANEURYSM FOLLOWING PROCEDURE (HCC): ICD-10-CM

## 2019-02-28 DIAGNOSIS — G89.3 ACUTE NEOPLASM-RELATED PAIN: ICD-10-CM

## 2019-02-28 LAB
ALBUMIN SERPL-MCNC: 3.4 G/DL (ref 3.5–5)
ALBUMIN/GLOB SERPL: 1.2 {RATIO} (ref 1.1–2.2)
ALP SERPL-CCNC: 125 U/L (ref 45–117)
ALT SERPL-CCNC: 20 U/L (ref 12–78)
ANION GAP SERPL CALC-SCNC: 6 MMOL/L (ref 5–15)
AST SERPL-CCNC: 14 U/L (ref 15–37)
BASOPHILS # BLD: 0.1 K/UL (ref 0–0.1)
BASOPHILS NFR BLD: 1 % (ref 0–1)
BILIRUB SERPL-MCNC: 0.4 MG/DL (ref 0.2–1)
BUN SERPL-MCNC: 12 MG/DL (ref 6–20)
BUN/CREAT SERPL: 10 (ref 12–20)
CALCIUM SERPL-MCNC: 8.7 MG/DL (ref 8.5–10.1)
CHLORIDE SERPL-SCNC: 107 MMOL/L (ref 97–108)
CO2 SERPL-SCNC: 27 MMOL/L (ref 21–32)
CREAT SERPL-MCNC: 1.16 MG/DL (ref 0.7–1.3)
DIFFERENTIAL METHOD BLD: ABNORMAL
EOSINOPHIL # BLD: 0.8 K/UL (ref 0–0.4)
EOSINOPHIL NFR BLD: 8 % (ref 0–7)
ERYTHROCYTE [DISTWIDTH] IN BLOOD BY AUTOMATED COUNT: 17.1 % (ref 11.5–14.5)
GLOBULIN SER CALC-MCNC: 2.9 G/DL (ref 2–4)
GLUCOSE SERPL-MCNC: 121 MG/DL (ref 65–100)
HCT VFR BLD AUTO: 32.8 % (ref 36.6–50.3)
HGB BLD-MCNC: 10.7 G/DL (ref 12.1–17)
IMM GRANULOCYTES # BLD AUTO: 0 K/UL (ref 0–0.04)
IMM GRANULOCYTES NFR BLD AUTO: 0 % (ref 0–0.5)
LEFT CFA PROX SYS PSV: 125.4 CM/S
LEFT POPLITEAL DIST SYS PSV: 51 CM/S
LEFT PROX PFA A PSV: 54.2 CM/S
LEFT SFA PROX VEL RATIO: 0.92
LEFT SUPER FEMORAL DIST SYS PSV: 55.2 CM/S
LEFT SUPER FEMORAL PROX SYS PSV: 115.1 CM/S
LYMPHOCYTES # BLD: 1.1 K/UL (ref 0.8–3.5)
LYMPHOCYTES NFR BLD: 11 % (ref 12–49)
MCH RBC QN AUTO: 30.5 PG (ref 26–34)
MCHC RBC AUTO-ENTMCNC: 32.6 G/DL (ref 30–36.5)
MCV RBC AUTO: 93.4 FL (ref 80–99)
MONOCYTES # BLD: 0.8 K/UL (ref 0–1)
MONOCYTES NFR BLD: 8 % (ref 5–13)
NEUTS SEG # BLD: 7.1 K/UL (ref 1.8–8)
NEUTS SEG NFR BLD: 72 % (ref 32–75)
NRBC # BLD: 0 K/UL (ref 0–0.01)
NRBC BLD-RTO: 0 PER 100 WBC
PLATELET # BLD AUTO: 260 K/UL (ref 150–400)
PMV BLD AUTO: 10.5 FL (ref 8.9–12.9)
POTASSIUM SERPL-SCNC: 3.7 MMOL/L (ref 3.5–5.1)
PROT SERPL-MCNC: 6.3 G/DL (ref 6.4–8.2)
RBC # BLD AUTO: 3.51 M/UL (ref 4.1–5.7)
SODIUM SERPL-SCNC: 140 MMOL/L (ref 136–145)
WBC # BLD AUTO: 10 K/UL (ref 4.1–11.1)

## 2019-02-28 PROCEDURE — 74011000258 HC RX REV CODE- 258: Performed by: INTERNAL MEDICINE

## 2019-02-28 PROCEDURE — 74011250636 HC RX REV CODE- 250/636: Performed by: INTERNAL MEDICINE

## 2019-02-28 PROCEDURE — 77030012965 HC NDL HUBR BBMI -A

## 2019-02-28 PROCEDURE — 85025 COMPLETE CBC W/AUTO DIFF WBC: CPT

## 2019-02-28 PROCEDURE — 80053 COMPREHEN METABOLIC PANEL: CPT

## 2019-02-28 PROCEDURE — 96411 CHEMO IV PUSH ADDL DRUG: CPT

## 2019-02-28 PROCEDURE — 96413 CHEMO IV INFUSION 1 HR: CPT

## 2019-02-28 PROCEDURE — 74011250637 HC RX REV CODE- 250/637: Performed by: INTERNAL MEDICINE

## 2019-02-28 PROCEDURE — 36415 COLL VENOUS BLD VENIPUNCTURE: CPT

## 2019-02-28 PROCEDURE — 96375 TX/PRO/DX INJ NEW DRUG ADDON: CPT

## 2019-02-28 PROCEDURE — 96415 CHEMO IV INFUSION ADDL HR: CPT

## 2019-02-28 RX ORDER — DIPHENHYDRAMINE HYDROCHLORIDE 50 MG/ML
50 INJECTION, SOLUTION INTRAMUSCULAR; INTRAVENOUS ONCE
Status: COMPLETED | OUTPATIENT
Start: 2019-02-28 | End: 2019-02-28

## 2019-02-28 RX ORDER — DIPHENHYDRAMINE HYDROCHLORIDE 50 MG/ML
50 INJECTION, SOLUTION INTRAMUSCULAR; INTRAVENOUS AS NEEDED
Status: ACTIVE | OUTPATIENT
Start: 2019-02-28 | End: 2019-03-01

## 2019-02-28 RX ORDER — SODIUM CHLORIDE 0.9 % (FLUSH) 0.9 %
10 SYRINGE (ML) INJECTION AS NEEDED
Status: ACTIVE | OUTPATIENT
Start: 2019-02-28 | End: 2019-02-28

## 2019-02-28 RX ORDER — ONDANSETRON 2 MG/ML
8 INJECTION INTRAMUSCULAR; INTRAVENOUS ONCE
Status: COMPLETED | OUTPATIENT
Start: 2019-02-28 | End: 2019-02-28

## 2019-02-28 RX ORDER — ACETAMINOPHEN 325 MG/1
650 TABLET ORAL ONCE
Status: COMPLETED | OUTPATIENT
Start: 2019-02-28 | End: 2019-02-28

## 2019-02-28 RX ORDER — SODIUM CHLORIDE 9 MG/ML
10 INJECTION INTRAMUSCULAR; INTRAVENOUS; SUBCUTANEOUS AS NEEDED
Status: ACTIVE | OUTPATIENT
Start: 2019-02-28 | End: 2019-02-28

## 2019-02-28 RX ORDER — HEPARIN 100 UNIT/ML
300-500 SYRINGE INTRAVENOUS AS NEEDED
Status: ACTIVE | OUTPATIENT
Start: 2019-02-28 | End: 2019-02-28

## 2019-02-28 RX ORDER — SODIUM CHLORIDE 9 MG/ML
50 INJECTION, SOLUTION INTRAVENOUS AS NEEDED
Status: DISPENSED | OUTPATIENT
Start: 2019-02-28 | End: 2019-03-01

## 2019-02-28 RX ADMIN — SODIUM CHLORIDE 10 ML: 9 INJECTION INTRAMUSCULAR; INTRAVENOUS; SUBCUTANEOUS at 11:31

## 2019-02-28 RX ADMIN — BENDAMUSTINE HYDROCHLORIDE 162.5 MG: 25 INJECTION, SOLUTION INTRAVENOUS at 16:20

## 2019-02-28 RX ADMIN — SODIUM CHLORIDE 50 ML/HR: 900 INJECTION, SOLUTION INTRAVENOUS at 11:30

## 2019-02-28 RX ADMIN — ONDANSETRON 8 MG: 2 INJECTION, SOLUTION INTRAMUSCULAR; INTRAVENOUS at 15:34

## 2019-02-28 RX ADMIN — Medication 10 ML: at 11:30

## 2019-02-28 RX ADMIN — DIPHENHYDRAMINE HYDROCHLORIDE 50 MG: 50 INJECTION INTRAMUSCULAR; INTRAVENOUS at 11:16

## 2019-02-28 RX ADMIN — SODIUM CHLORIDE 10 ML: 9 INJECTION INTRAMUSCULAR; INTRAVENOUS; SUBCUTANEOUS at 11:33

## 2019-02-28 RX ADMIN — Medication 10 ML: at 16:38

## 2019-02-28 RX ADMIN — ACETAMINOPHEN 650 MG: 325 TABLET ORAL at 11:16

## 2019-02-28 RX ADMIN — Medication 500 UNITS: at 16:38

## 2019-02-28 RX ADMIN — DEXAMETHASONE SODIUM PHOSPHATE 12 MG: 4 INJECTION, SOLUTION INTRAMUSCULAR; INTRAVENOUS at 15:36

## 2019-02-28 RX ADMIN — OBINUTUZUMAB 1000 MG: 1000 INJECTION, SOLUTION, CONCENTRATE INTRAVENOUS at 12:16

## 2019-02-28 NOTE — PROGRESS NOTES
[facility] - Chemotherapy Progress Note    6834 Pt admit to [facility] for O-B[place]ine C1D1 ambulatory in stable condition. Assessment completed. No new concerns voiced. PAC with positive blood return. Labs obtained and patient proceeded to MD appointment. RN received verbal ok to treat post ER. Patient returned and no change to treatment. MD/NP educated patient on new treatment regimen. RN reinforced side effect and when to call the MD.    Visit Vitals  /68 (BP 1 Location: Right arm, BP Patient Position: Sitting)   Pulse 90   Temp 97.8 °F (36.6 °C)   Resp 18   Ht 5' 7\" (1.702 m)   Wt 66 kg (145 lb 6.4 oz)   SpO2 100%   BMI 22.77 kg/m²       Patient Vitals for the past 12 hrs:   Temp Pulse Resp BP SpO2   02/28/19 1633 97.9 °F (36.6 °C) 70 16 106/67 100 %   02/28/19 1547 97.9 °F (36.6 °C) 69 16 98/63 100 %   02/28/19 1516 97.6 °F (36.4 °C) 68 16 104/62 --   02/28/19 1445 97.8 °F (36.6 °C) 70 16 100/61 97 %   02/28/19 1410 96.4 °F (35.8 °C) 71 16 106/67 --   02/28/19 1347 -- 66 18 109/72 --   02/28/19 1246 97.3 °F (36.3 °C) 90 18 103/70 98 %   02/28/19 0944 97.8 °F (36.6 °C) 90 18 107/68 100 %         Recent Results (from the past 12 hour(s))   CBC WITH AUTOMATED DIFF    Collection Time: 02/28/19  9:56 AM   Result Value Ref Range    WBC 10.0 4.1 - 11.1 K/uL    RBC 3.51 (L) 4.10 - 5.70 M/uL    HGB 10.7 (L) 12.1 - 17.0 g/dL    HCT 32.8 (L) 36.6 - 50.3 %    MCV 93.4 80.0 - 99.0 FL    MCH 30.5 26.0 - 34.0 PG    MCHC 32.6 30.0 - 36.5 g/dL    RDW 17.1 (H) 11.5 - 14.5 %    PLATELET 664 886 - 922 K/uL    MPV 10.5 8.9 - 12.9 FL    NRBC 0.0 0  WBC    ABSOLUTE NRBC 0.00 0.00 - 0.01 K/uL    NEUTROPHILS 72 32 - 75 %    LYMPHOCYTES 11 (L) 12 - 49 %    MONOCYTES 8 5 - 13 %    EOSINOPHILS 8 (H) 0 - 7 %    BASOPHILS 1 0 - 1 %    IMMATURE GRANULOCYTES 0 0.0 - 0.5 %    ABS. NEUTROPHILS 7.1 1.8 - 8.0 K/UL    ABS. LYMPHOCYTES 1.1 0.8 - 3.5 K/UL    ABS. MONOCYTES 0.8 0.0 - 1.0 K/UL    ABS.  EOSINOPHILS 0.8 (H) 0.0 - 0.4 K/UL    ABS. BASOPHILS 0.1 0.0 - 0.1 K/UL    ABS. IMM. GRANS. 0.0 0.00 - 0.04 K/UL    DF AUTOMATED     METABOLIC PANEL, COMPREHENSIVE    Collection Time: 02/28/19  9:56 AM   Result Value Ref Range    Sodium 140 136 - 145 mmol/L    Potassium 3.7 3.5 - 5.1 mmol/L    Chloride 107 97 - 108 mmol/L    CO2 27 21 - 32 mmol/L    Anion gap 6 5 - 15 mmol/L    Glucose 121 (H) 65 - 100 mg/dL    BUN 12 6 - 20 MG/DL    Creatinine 1.16 0.70 - 1.30 MG/DL    BUN/Creatinine ratio 10 (L) 12 - 20      GFR est AA >60 >60 ml/min/1.73m2    GFR est non-AA >60 >60 ml/min/1.73m2    Calcium 8.7 8.5 - 10.1 MG/DL    Bilirubin, total 0.4 0.2 - 1.0 MG/DL    ALT (SGPT) 20 12 - 78 U/L    AST (SGOT) 14 (L) 15 - 37 U/L    Alk. phosphatase 125 (H) 45 - 117 U/L    Protein, total 6.3 (L) 6.4 - 8.2 g/dL    Albumin 3.4 (L) 3.5 - 5.0 g/dL    Globulin 2.9 2.0 - 4.0 g/dL    A-G Ratio 1.2 1.1 - 2.2         Medications:  Acetaminophen PO  Benadryl IVP  Obintuzmab IV  Dexamethasone IVP  Zofran IVP  Bendeka IVP       1710 Pt tolerated treatment well. PAC maintained positive blood return throughout treatment, flushed with positive blood return at conclusion and throughout. [place] needle left intact for treatment planned for 3/1/19. D/c home ambulatory in no distress.  Pt aware of next appointment scheduled for 3/1/19 at 9:00 am.

## 2019-02-28 NOTE — PROGRESS NOTES
Evelin Schmitz is a 41 y.o. male here for follow up of lymphoma. Patient with complaints of leg and lower back pain, rates as a 5 out 10.

## 2019-02-28 NOTE — PROGRESS NOTES
[address] [facility] Oncology at [address] [place] [address]  [phone]    Hematology / Oncology Established Visit    Reason for Visit:   Earle Mohamud is a 41 y.o. male who comes in for f/u of lymphoma. Hematology Oncology Treatment History:     Diagnosis: Follicular lymphoma    Stage: IV    Pathology:   11/13/18 right inguinal LN excision: Follicular lymphoma, high-grade (grade 3a of 3). Comment   The delaney architecture is entirely effaced by atypical lymphocytic proliferation with prominent nodular growth pattern. The majority of the atypical lymphocytes are small to medium in size and have irregular nuclear outlines and inconspicuous nucleoli, morphologically consistent with centrocytes. Scattered large lymphocytes with prominent nucleoli, consistent with centroblasts are identified (> 15 per high-power field). Focal increase in mitotic figures is noted. Occasional follicular dendritic cells are seen. By immunohistochemistry, the atypical lymphocytes are positive for CD20, PAX5, CD10, BCL6 and BCL2 (weak, focal) and negative for MUM1. CD3, CD5 and CD43 stain numerous background T lymphocytes. Ki-67 reveals a high proliferation index in the neoplastic follicles (overall 39-25%). Clinical history indicates 14 cm retroperitoneal mass with bilateral inguinal lymphadenopathy. In summary, the combined morphologic and phenotypic findings are diagnostic of a high-grade follicular lymphoma (grade 3a of 3). There is no evidence of diffuse large B-cell lymphoma. Flow cytometry analysis:   Monoclonal B-cell population (47 % of all cells) with mild increase in side and forward scatter properties expressing CD19, CD20, CD23 and CD10 with surface lambda light chain restriction. No phenotypically aberrant T-cell population. Flow cytometry was performed at [facility]     Prior Treatment: Obinutuzumab-CHOP.  Obinutuzumab: 1000 mg weekly on days 1, 8, 15 for cycle 1, then 1000 mg on day 1 q21 days for cycles 2-6, then monotherapy 1000 mg every 21 days for cycle 7, 8 with Cyclophosphamide 750mg/m2, Doxorubicin 50mg/m2, Vincristine 1.4mg/m2 on day 1 and Prednisone 100mg on Days 1-5, every 21 days for a total of 2 cycles completed late 1/2019. Regimen discontinued due to NSTEMI. Current Treatment: Obinutuzumab + Bendamustine: 1000 mg Obinutuzumab on day 1 + Bendamustine 90mg/m2 on days 1-2 on a 28-day cycle x 4 cycles     History of Present Illness:   Mr. Zapata is a 40 y/o male with HTN who comes in for evaluation of Follicular lymphoma. He states he was in his usual state of health in early November 2018, but then he developed low back pain and presented to the ER. The pain was described as constant, radiating to the buttocks, without exacerbating or alleviating factors, associated w/ increased urination, no n/v/d, no dysuria, no fever, he's unsure about weight loss. Work-up at outside hospital revealed a retroperitoneal mass seen on CT imaging, and he was transferred to [facility] for further work-up. CT there showed a large retroperitoneal mass encircling the aorta with invasion of the left renal hilum and left adrenal gland. There were bilateral inguinal lymph nodes and moderate left hydronephrosis. He was evaluated while at [facility] and was noted to have palpable nodes in his groin for approximately the past 1 month. No testicular mass, anorexia, weight loss, fevers, night sweats, chest pain, shortness of breath, abdominal pain or nausea. He underwent excisional LN biopsy of right inguinal LN. He was told that he had likely lymphoma. He was advised to follow up as outpatient for PET scan, bone marrow biopsy. However, patient states he was lost to f/u. He never received any calls or appointment details. His aunt urged patient to seek care elsewhere and his PCP recommended [facility]. At our first meeting on 12/13/18, he tells me that he was working full time, feeling well until early Nov 2018. When he developed the left lower back pain, he went to [facility] and [facility]. No fevers, chills, night sweats. He has lost 15 lbs in the past month due to low appetite. No n/v/d. No current constipation. No CP, SOB. No h/o cardiac disease aside from HTN, Hyperlipiemia. No hematochezia/melena, hematuria. He has HTN and XOL, which he was taking meds for, but has run out. Has no PCP currently. He is uninsured. He works as a cook, currently working part time. He has children aged 12 and 13. Interval history: Today, patient comes in to start his new chemotherapy regimen. Since last visit, he was found to have a pseudoaneurysm in the left groin at site of recent cardiac cath access. This was injected with thrombin on 2/26/19. Pt reports the swelling and pain are improving, he is now walking much better. States he continues to take all of his medications as prescribed. Denies any current chest pain/SOB. Denies n/v, constipation or diarrhea. He denies any recent fevers, chills, sweats, CP, SOB. He has a dry cough occasionally. Reports pain is stable on current pain regimen. His appetite is good and weight is stable since last visit. FAMILY HISTORY:  His father has a history of leukemia, currently in remission, treated at [facility] [place]. LYMPHATICS:  Bilateral palpable inguinal adenopathy.     Past Medical History:   Diagnosis Date    Anxiety     Hyperlipidemia     Hypertension     Lymphadenopathy 11/12/2018      Past Surgical History:   Procedure Laterality Date    HX HEART CATHETERIZATION  02/2019    HX OTHER SURGICAL  02/2019    cardiac stent    IR INJECTION PSEUDOANEURYSM  2/26/2019      Social History     Tobacco Use    Smoking status: Current Every Day Smoker     Packs/day: 1.00     Years: 20.00     Pack years: 20.00    Smokeless tobacco: Never Used   Substance Use Topics    Alcohol use: No     Frequency: Never      Family History   Problem Relation Age of Onset    Hypertension Father     Diabetes Mother Current Outpatient Medications   Medication Sig    oxyCODONE IR (ROXICODONE) 5 mg immediate release tablet Take 1 Tab by mouth every four (4) hours as needed for Pain. Max Daily Amount: 30 mg.    ALPRAZolam (XANAX) 1 mg tablet Take 1 Tab by mouth three (3) times daily as needed for Anxiety. Max Daily Amount: 3 mg.  escitalopram oxalate (LEXAPRO) 10 mg tablet Take 1 Tab by mouth daily.  atorvastatin (LIPITOR) 80 mg tablet Take 1 Tab by mouth nightly.  clopidogrel (PLAVIX) 75 mg tab 1 Tab by Per NG tube route daily.  metoprolol succinate (TOPROL-XL) 25 mg XL tablet Take 1 Tab by mouth daily.  lisinopril (PRINIVIL, ZESTRIL) 10 mg tablet Take 1 Tab by mouth daily. DO NOT TAKE for 5 days    L. acidoph & paracasei- S therm- Bifido (AUSTIN-Q/RISAQUAD) 8 billion cell cap cap Take 1 Cap by mouth daily.  magic mouthwash solution Take 15-30 mL by mouth four (4) times daily. Magic mouth wash   Maalox  Lidocaine 2% viscous   Diphenhydramine oral solution    Swish and spit for mouth pain, ok to swallow for throat pain     Pharmacy to mix equal portions of ingredients to a total volume as indicated in the dispense amount.  prochlorperazine (COMPAZINE) 10 mg tablet Take 1 Tab by mouth every six (6) hours as needed.  senna-docusate (PERICOLACE) 8.6-50 mg per tablet Take 1 Tab by mouth daily.  ondansetron hcl (ZOFRAN) 8 mg tablet Take 1 Tab by mouth every eight (8) hours as needed for Nausea.  predniSONE (DELTASONE) 20 mg tablet Take 100 mg by mouth daily (with breakfast). (on days 1-5 of each chemo cycle)    naloxone (NARCAN) 4 mg/actuation nasal spray Use 1 spray intranasally, then discard. Repeat with new spray every 2 min as needed for opioid overdose symptoms, alternating nostrils.  aspirin delayed-release (ASPIR-81) 81 mg tablet Take 1 Tab by mouth daily. No current facility-administered medications for this visit.        No Known Allergies     Review of Systems: A complete review of systems was obtained, negative except as described above. Physical Exam:     Visit Vitals  /68   Pulse 90   Temp 97.8 °F (36.6 °C)   Ht 5' 7\" (1.702 m)   Wt 145 lb 6.4 oz (66 kg)   SpO2 100%   BMI 22.77 kg/m²     ECOG PS: 0  General: Well developed, no acute distress  Eyes: PERRLA, EOMI, anicteric sclerae  HENT: Atraumatic, OP clear, poor dentition, multiple dental caries, no erythema,TMs intact without erythema  Neck: Supple  Lymphatic: No supraclavicular, axillary. R cervical 2cm LN absent. Bilateral small 2-3cm palpable inguinal adenopathy  Respiratory: CTAB, normal respiratory effort  CV: Normal rate, regular rhythm, no murmurs, no peripheral edema  GI: Soft, nontender, nondistended, no masses, no hepatomegaly, no splenomegaly  MS: Normal gait and station. Digits without clubbing or cyanosis. Skin: No rashes, ecchymoses, or petechiae. Normal temperature, turgor, and texture. Large hematoma present on inner left thigh and along groin, firm to palpation. Neuro/Psych: Alert, oriented. 5/5 strength in all 4 extremities. Appropriate affect, normal judgment/insight. Results:     Lab Results   Component Value Date/Time    WBC 10.0 02/28/2019 09:56 AM    HGB 10.7 (L) 02/28/2019 09:56 AM    HCT 32.8 (L) 02/28/2019 09:56 AM    PLATELET 093 10/70/8534 09:56 AM    MCV 93.4 02/28/2019 09:56 AM    ABS.  NEUTROPHILS 7.1 02/28/2019 09:56 AM    Hemoglobin (POC) 15.0 06/05/2009 02:13 PM    Hematocrit (POC) 39 02/14/2019 01:24 PM     Lab Results   Component Value Date/Time    Sodium 139 02/22/2019 08:18 PM    Potassium 4.1 02/22/2019 08:18 PM    Chloride 103 02/22/2019 08:18 PM    CO2 28 02/22/2019 08:18 PM    Glucose 100 02/22/2019 08:18 PM    BUN 12 02/22/2019 08:18 PM    Creatinine 0.91 02/22/2019 08:18 PM    GFR est AA >60 02/22/2019 08:18 PM    GFR est non-AA >60 02/22/2019 08:18 PM    Calcium 9.1 02/22/2019 08:18 PM    Sodium (POC) 136 02/14/2019 01:24 PM    Potassium (POC) 3.9 02/14/2019 01:24 PM    Chloride (POC) 102 2019 01:24 PM    Glucose (POC) 249 (H) 02/15/2019 10:21 PM    BUN (POC) 14 2019 01:24 PM    Creatinine (POC) 0.9 2019 01:24 PM    Calcium, ionized (POC) 1.24 2019 01:24 PM     Lab Results   Component Value Date/Time    Bilirubin, total 0.5 2019 01:11 PM    ALT (SGPT) 16 2019 01:11 PM    AST (SGOT) 13 2019 01:11 PM    Alk. phosphatase 99 2019 01:11 PM    Protein, total 6.8 2019 01:11 PM    Albumin 4.3 2019 01:11 PM    Globulin 2.6 02/15/2019 02:15 AM     No results found for: IRON, FE, TIBC, IBCT, PSAT, FERR    No results found for: B12LT, FOL, RBCF  Lab Results   Component Value Date/Time    TSH 1.53 2016 04:40 AM     18:     Lab Results   Component Value Date/Time    Hepatitis A, IgM NONREACTIVE 2018 04:53 PM    Hepatitis B surface Ag <0.10 2018 04:53 PM    Hepatitis B core, IgM NONREACTIVE 2018 04:53 PM         Imagin/9/18 Abd/pelvis CT: IMPRESSION:  1. Interval development of a large retroperitoneal mass encircling the aorta with invasion of the left renal hilum and left adrenal gland. Several adjacent lymph nodes are seen extending into the peritoneum and underneath the  diaphragmatic natalie. This most likely represents lymphoma. 2. Several new bilateral enlarged inguinal lymph nodes also likely representing lymphoma. 3. Moderate left hydronephrosis with a delayed renal nephrogram related to decreased renal function. This is related to the invasion of the renal hilum. 18 Chest CT: IMPRESSION:  Trace left pleural effusion. Bilateral lower lobe atelectasis. Large  retroperitoneal mass lesion again demonstrated. PET 18:  FINDINGS:  HEAD/NECK: Right palatine tonsil intense hypermetabolism, max SUV 18. Multilevel  bilateral cervical adenopathy, with max SUV 12 in a left supraclavicular node. Cerebral evaluation is limited by normal intense activity.   CHEST: Solitary hypermetabolic left axillary node, max SUV 11. ABDOMEN/PELVIS: Bulky retroperitoneal mass max SUV 27, with several additional  small active abdomino-pelvic nodes. Bilateral inguinal nodes with max SUV 12 on  the left. SKELETON: No foci of abnormal hypermetabolism in the axial and visualized  appendicular skeleton. IMPRESSION:   1. Right palatine tonsil tumor involvement (Deauville 5). 2. Bilateral cervical delaney involvement (Deauville 5). 3. Left axillary node involvement (Deauville 5). 4. Bulky retroperitoneal lymphoma mass and additional smaller hypermetabolic  abdomino-pelvic nodes (Deauville 5). 5. Bilateral inguinal delaney involvement (Deauville 5). Deauville Five Point Scale  1. No uptake or no residual uptake (when used interim)  2. Slight uptake, but below blood pool (mediastinum)  3. Uptake above mediastinal, but below/equal to uptake in the liver  4. Uptake slightly to moderately higher than liver  5. Markedly increased uptake or any new lesion (on response evaluation)  Each FDG-avid (or previously FDG avid) lesion is rated independently. Reference values:  Mediastinal blood pool: 2.1 SUV  Liver (background): 2.2 SUV    PET/CT 2/05/19:   IMPRESSION:  1. No Foci of Abnormal Hypermetabolism (Deauville 1). 2. Resolved activity in the right palatine tonsil, bilateral cervical nodes,left axillary node, retroperitoneal/abdominal pelvic adenopathy, bilateral inguinal nodes. Echo 2/14/19:  Normal cavity size, wall thickness and systolic function (ejection fraction normal). The muscle mass is normal. The cavity shape is normal. The estimated ejection fraction is 41 - 45%. Abnormal wall motion as described on the wall scoring diagram below. End-systolic volume is normal. Normal left ventricular strain. There is mild (grade 1) left ventricular diastolic dysfunction. Normal left ventricular diastolic pressure.  End-diastolic volume is normal.    LE arterial duplex 2/22/19:  There is evidence of left groin pseudoaneurysm noted arising from distal common femoral artery, pseudo lobe measures 2.32cm x 2.58cm and pseudo neck length measuring 0.63cm. There is no evidence of hemodynamically significant left lower extremity arterial obstruction. JACLYN is 1.03 on the right and 1.02 on the left. LE arterial duplex s/p Thrombin Injection to Pseudoaneurysm 2/26/19:  Successful thrombin injection procedure of the left groin with no further flow seen. No evidence of hemodnyamically significant obstruction in the left lower extremity. Left lower extremity arterial duplex performed. Confirmed pseudoaneurysm in left groin with small neck. Following thrombin injection, no further flow seen in the pseudoaneurysm. The left common femoral, profunda femoral, femoral, popliteal, posterior tibial and anterior arteries were imaged. Mainly triphasic flow was seen with no evidence of significantly elevated velocities. Repeat LE arterial duplex 2/27/19:  Continued thrombosed left groin pseudoaneurysm following thrombin injection on 02/26/2019. No flow or color fill is identified. The hematoma measures approximately 2.1 x 2.9 cm in diameter. The common femoral, deep femoral, femoral, and popliteal arteries are patent with mainly tri-phasic flow and no significant hemodynamically obstruction is noted.       Assessment & Plan:   Shawn Cuellar is a 41 y.o. male comes in for evaluation and management of lymphoma. 1. Follicular lymphoma: Grade 3a. Although grade 3a disease is considered more indolent and can be treated like grade 1/2 disease, this patient has indications for treatment: bulky disease encircling the aorta causing symptoms. Bone marrow negative for lymphoma, but was hypercellular. BR better than RCHOP, but based on GALLIUM study, Obinutuzumab-based induction and maintenance prolongs PFS over that seen with rituximab-based therapy.  Therefore, I have chosen to treat patient with O-CHOP regimen for 6 cycles, followed by possible maintenance Obinutuzumab. We discussed the risks and benefits of O-CHOP chemotherapy. The potential side effects include, but are not limited to: nausea, vomiting, diarrhea, constipation, Flu-like symptoms, infusion reaction, allergic reaction, flushing, taste changes, increased risk of infection, anemia, fatigue, alopecia, neuropathy, nail changes, cardiac damage, mucositis, myleosuppression, infertility and rarely, death. Patient completed chemoteaching and received a chemotherapy education folder. CR after 2 cycles of O-CHOP, but switched regiment Bendamustine-O due to cardiac event. Chemotherapy consent signed and patient educated about side effects including: cytopenias, infections, bleeding, n/v/d, hair loss, skin rash, secondary malignancy, kidney or liver toxicity. -- Proceed with cycle 1, day 1 of Bendamustine-Obinutuzumab today. He will remain on this regimen every 4 weeks for a total of 4 cycles. -- Neulasta on body on day 2 of each cycle  -- Return in 3 weeks for aamir counts and MD f/u (3/21/19)  -- Cycle 2 planned for 3/28/19    2. Use of cardiotoxic chemotherapy: Discussed risks/benefits of using doxorubicin. Echo on 12/17/18 showed EF 65%, but drop to EF 45% immediately after MI.  -- Patient will f/u with Dr. La Villegas in Cardiology at [facility]. 3. Neoplasm related pain / Anxiety: Left lower back pain. Taking Oxycodone 5mg bid prn. Signed pain contract on 12/28/18. Counseled on adding SSRI if anxiety becomes worse. -- Continue Oxycodone 5mg only twice a day, 80 tabs refilled 1/24/19  -- Ativan 0.5mg bid prn, 60 tabs written 1/24/18  -- Follow up with [facility] on 3/19/19   -- Continues on Lexapro 10 mg daily for anxiety    4. HTN / Hyperlipidemia: Well controlled today on BB, ACEI. 5. Tobacco abuse: Reiterated strong recommendation for smoking cessation in the setting of recent MI. Discussed cessation strategies and pt does not want Wellbutrin given past experience with it.  Still smoking a pack per day. I discussed and offered Chantix, but pt declined. His father smokes too. 6. Dental infection: Caused neutropenic fever and hospital admission recently. No abscess. Blood cultures negative. Treated with IV antibiotics followed by Augmentin. -- Dental evaluation vs OMFS recommended. Margiedc Yolanda in [address] met with him about affordable dental care options. -- Unable to afford dental care at this time. 7. H/o STEMI / Cardiac arrest: P/w CP on 2/14/19 followed by collapse and cardiac arrest. Cardiac cath at [facility] by Dr. Jorge Pina revealed 90-95% occlusion of proximal LAD, TOM placed. Dr. Jorge Pina [phone]  -- f/u with Cardiology: originally planned for 3/12/19 but has been called to reschedule. -- Cardiac rehab scheduled for 3/5/19.  -- On dual antiplatelet therapy aspirin and clopidogrel  -- On BB, ACEI, statin      8. Left groin pseudoaneurysm: 10cm region of induration without fluctuance at presentation now decreasing in size and tenderness after thrombin injection by Dr. Lucy Blackwell on 2/26/19. Repeat US shows stable thrombus to pseudoaneurysm, patent flow to the common femoral, deep femoral, femoral, and popliteal arteries with no significant obstruction. -- Patient to notify us if swelling/pain worsens, at which point LLE arterial duplex will be ordered with IR consult as well. Emotional well being: Pt is coping well with his/her disease and has excellent support. Met with SW in the past as well as today. I appreciate the opportunity to participate in Mr. Kayce cabezas.     Signed By: Yassine Gentile MD     February 28, 2019

## 2019-03-01 ENCOUNTER — HOSPITAL ENCOUNTER (OUTPATIENT)
Dept: INFUSION THERAPY | Age: 44
Discharge: HOME | End: 2019-03-01
Payer: COMMERCIAL

## 2019-03-01 VITALS
SYSTOLIC BLOOD PRESSURE: 119 MMHG | BODY MASS INDEX: 23.13 KG/M2 | DIASTOLIC BLOOD PRESSURE: 71 MMHG | WEIGHT: 147.4 LBS | HEART RATE: 70 BPM | HEIGHT: 67 IN | OXYGEN SATURATION: 100 % | RESPIRATION RATE: 16 BRPM | TEMPERATURE: 96.7 F

## 2019-03-01 DIAGNOSIS — C82.90 FOLLICULAR LYMPHOMA, UNSPECIFIED FOLLICULAR LYMPHOMA TYPE, UNSPECIFIED BODY REGION (HCC): Primary | ICD-10-CM

## 2019-03-01 PROCEDURE — 74011250636 HC RX REV CODE- 250/636: Performed by: INTERNAL MEDICINE

## 2019-03-01 PROCEDURE — 96375 TX/PRO/DX INJ NEW DRUG ADDON: CPT

## 2019-03-01 PROCEDURE — 74011000258 HC RX REV CODE- 258: Performed by: INTERNAL MEDICINE

## 2019-03-01 PROCEDURE — 74011250636 HC RX REV CODE- 250/636

## 2019-03-01 PROCEDURE — 96409 CHEMO IV PUSH SNGL DRUG: CPT

## 2019-03-01 PROCEDURE — 96377 APPLICATON ON-BODY INJECTOR: CPT

## 2019-03-01 RX ORDER — SODIUM CHLORIDE 9 MG/ML
10 INJECTION INTRAMUSCULAR; INTRAVENOUS; SUBCUTANEOUS AS NEEDED
Status: ACTIVE | OUTPATIENT
Start: 2019-03-01 | End: 2019-03-01

## 2019-03-01 RX ORDER — SODIUM CHLORIDE 9 MG/ML
25 INJECTION, SOLUTION INTRAVENOUS AS NEEDED
Status: DISPENSED | OUTPATIENT
Start: 2019-03-01 | End: 2019-03-02

## 2019-03-01 RX ORDER — HEPARIN 100 UNIT/ML
300-500 SYRINGE INTRAVENOUS AS NEEDED
Status: ACTIVE | OUTPATIENT
Start: 2019-03-01 | End: 2019-03-01

## 2019-03-01 RX ORDER — ONDANSETRON 2 MG/ML
8 INJECTION INTRAMUSCULAR; INTRAVENOUS ONCE
Status: COMPLETED | OUTPATIENT
Start: 2019-03-01 | End: 2019-03-01

## 2019-03-01 RX ORDER — SODIUM CHLORIDE 0.9 % (FLUSH) 0.9 %
10 SYRINGE (ML) INJECTION AS NEEDED
Status: ACTIVE | OUTPATIENT
Start: 2019-03-01 | End: 2019-03-01

## 2019-03-01 RX ADMIN — PEGFILGRASTIM 6 MG: KIT SUBCUTANEOUS at 10:58

## 2019-03-01 RX ADMIN — Medication 500 UNITS: at 10:56

## 2019-03-01 RX ADMIN — BENDAMUSTINE HYDROCHLORIDE 162.5 MG: 25 INJECTION, SOLUTION INTRAVENOUS at 10:40

## 2019-03-01 RX ADMIN — SODIUM CHLORIDE 25 ML/HR: 900 INJECTION, SOLUTION INTRAVENOUS at 10:00

## 2019-03-01 RX ADMIN — Medication 10 ML: at 10:56

## 2019-03-01 RX ADMIN — DEXAMETHASONE SODIUM PHOSPHATE 12 MG: 4 INJECTION, SOLUTION INTRAMUSCULAR; INTRAVENOUS at 09:52

## 2019-03-01 RX ADMIN — Medication 10 ML: at 09:47

## 2019-03-01 RX ADMIN — ONDANSETRON 8 MG: 2 INJECTION, SOLUTION INTRAMUSCULAR; INTRAVENOUS at 09:47

## 2019-03-01 NOTE — PROGRESS NOTES
Outpatient Infusion [facility] - Chemotherapy Progress Note    1487 Pt admit to [facility] for O-Bendamustine ambulatory in stable condition. Assessment completed. No new concerns voiced. PAC with positive blood return. Intact needle from 2/28/19 treatment. Chemotherapy Flowsheet 3/1/2019   Cycle C1D2   Date 3/1/2019   Drug / Regimen O-Bendamustine   Pre Meds -   Notes -       Visit Vitals  /71 (BP 1 Location: Left arm, BP Patient Position: Sitting)   Pulse 75   Temp 96.8 °F (36 °C)   Resp 16   Ht 5' 7\" (1.702 m)   Wt 66.9 kg (147 lb 6.4 oz)   SpO2 100%   BMI 23.09 kg/m²       Medications:  Zofran IVP  Dexamethasone IV  Bendamustine IVP  Neulasta OBI SQ     Education provided to patient about Neulasta On Body Injector including: side effects, how/when to remove the device, as well as what to do in the event of device malfunction. Opportunity for questions was provided and all questions were answered. Patient verbalized understanding. Provided patient with chemo/biotherapy discharge instructions. 1100 Pt tolerated treatment well. PAC maintained positive blood return throughout treatment, flushed with positive blood return at conclusion and throughout. D/c home ambulatory in no distress.  Pt aware of next appointment scheduled for 3/21/19 at 10:30 am.

## 2019-03-01 NOTE — PROGRESS NOTES

## 2019-03-01 NOTE — PROGRESS NOTES
[facility]   Social Work Navigator Encounter     Patient Name:  Eda Sicard    Medical History: lymphoma    Narrative: Social work services requested by patient. Patient advised he received letter from "[employer] indicating they had not received his disability application yet. Follow up with Anuradha Kate at [address] [place] [address] who advised the application was received on 2/4 and they are reviewing his claim. He notes additional medical records are required and can be faxed to [phone]. Barriers to Care: financial    Plan: Disability Determination Services requesting additional medical records. Thank you,  MARLENA Carty    This note will not be viewable in [address]

## 2019-03-05 ENCOUNTER — HOSPITAL ENCOUNTER (OUTPATIENT)
Dept: CARDIAC REHAB | Age: 44
Discharge: HOME | End: 2019-03-05
Payer: COMMERCIAL

## 2019-03-05 VITALS — WEIGHT: 147 LBS | BODY MASS INDEX: 23.07 KG/M2 | HEIGHT: 67 IN

## 2019-03-05 PROCEDURE — 93798 PHYS/QHP OP CAR RHAB W/ECG: CPT

## 2019-03-05 NOTE — CARDIO/PULMONARY
Masoud Hardin   presented for cardiac rehab orientation and exercise tolerance test today with a primary diagnosis of NSTEMI with PCI of LAD s/p cardiac arrest. Pt has no previous cardiac history. Pt was diagnosed with lymphoma in  and was receiving chemo wh he cardiac arrested. LVEF is 41% (obtained the day of cardiac arrest) . Cardiac risk factors include:  gender, HLD, smoking,  and sedentary lifestyle. CAD risk factors were reviewed with patient. Pt is . He lives with his father. 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 Depression score PHQ9 is 6 and this is considered high. The elevated questions revolve around being tired, which could be a result of the lymphoma and chemo. Pt is also under the care for his anxiety and depression. Patient denied chest pain or SOB during 6 minute walk on treadmill at 1.9 mph, with RPE 12. Cardiac rhythm was SR/ST without ectopy. Exercise plan was developed. Pt will attend cardiac rehab 2-3 times per week. Cardiac rehab book reviewed and given to patient.    John Beltran RN

## 2019-03-06 ENCOUNTER — HOSPITAL ENCOUNTER (OUTPATIENT)
Dept: CARDIAC REHAB | Age: 44
Discharge: HOME | End: 2019-03-06
Payer: COMMERCIAL

## 2019-03-06 VITALS — WEIGHT: 147 LBS | BODY MASS INDEX: 23.02 KG/M2

## 2019-03-06 PROCEDURE — 93798 PHYS/QHP OP CAR RHAB W/ECG: CPT

## 2019-03-07 ENCOUNTER — APPOINTMENT (OUTPATIENT)
Dept: INFUSION THERAPY | Age: 44
End: 2019-03-07
Payer: COMMERCIAL

## 2019-03-08 ENCOUNTER — TELEPHONE (OUTPATIENT)
Dept: PALLATIVE CARE | Age: 44
End: 2019-03-08

## 2019-03-08 ENCOUNTER — HOSPITAL ENCOUNTER (OUTPATIENT)
Dept: NON INVASIVE DIAGNOSTICS | Age: 44
Discharge: HOME | End: 2019-03-08
Attending: NURSE PRACTITIONER
Payer: COMMERCIAL

## 2019-03-08 VITALS
SYSTOLIC BLOOD PRESSURE: 124 MMHG | BODY MASS INDEX: 23.07 KG/M2 | DIASTOLIC BLOOD PRESSURE: 80 MMHG | HEIGHT: 67 IN | WEIGHT: 147 LBS

## 2019-03-08 DIAGNOSIS — C82.33 FOLLICULAR LYMPHOMA GRADE IIIA OF INTRA-ABDOMINAL LYMPH NODES (HCC): ICD-10-CM

## 2019-03-08 LAB
ECHO AO ROOT DIAM: 3.24 CM
ECHO AV AREA PEAK VELOCITY: 2.4 CM2
ECHO AV PEAK GRADIENT: 4.5 MMHG
ECHO AV PEAK VELOCITY: 105.61 CM/S
ECHO EST RA PRESSURE: 3 MMHG
ECHO LA MAJOR AXIS: 3.14 CM
ECHO LA TO AORTIC ROOT RATIO: 0.97
ECHO LA VOL 2C: 34.9 ML (ref 18–58)
ECHO LA VOL 4C: 41.96 ML (ref 18–58)
ECHO LA VOL BP: 42.88 ML (ref 18–58)
ECHO LA VOL/BSA BIPLANE: 24.17 ML/M2 (ref 16–28)
ECHO LA VOLUME INDEX A2C: 19.67 ML/M2 (ref 16–28)
ECHO LA VOLUME INDEX A4C: 23.65 ML/M2 (ref 16–28)
ECHO LV INTERNAL DIMENSION DIASTOLIC: 4.59 CM (ref 4.2–5.9)
ECHO LV INTERNAL DIMENSION SYSTOLIC: 3.24 CM
ECHO LV IVSD: 1.06 CM (ref 0.6–1)
ECHO LV MASS 2D: 165.9 G (ref 88–224)
ECHO LV MASS INDEX 2D: 93.5 G/M2 (ref 49–115)
ECHO LV POSTERIOR WALL DIASTOLIC: 0.81 CM (ref 0.6–1)
ECHO LVOT DIAM: 1.82 CM
ECHO LVOT PEAK GRADIENT: 3.9 MMHG
ECHO LVOT PEAK VELOCITY: 98.45 CM/S
ECHO MV A VELOCITY: 64.54 CM/S
ECHO MV E DECELERATION TIME (DT): 207.8 MS
ECHO MV E VELOCITY: 76.45 CM/S
ECHO MV E/A RATIO: 1.18
ECHO MV REGURGITANT PEAK GRADIENT: 74 MMHG
ECHO MV REGURGITANT PEAK VELOCITY: 430.02 CM/S
ECHO PULMONARY ARTERY SYSTOLIC PRESSURE (PASP): 28.7 MMHG
ECHO PV MAX VELOCITY: 71.68 CM/S
ECHO PV PEAK GRADIENT: 2.1 MMHG
ECHO RIGHT VENTRICULAR SYSTOLIC PRESSURE (RVSP): 28.7 MMHG
ECHO RV INTERNAL DIMENSION: 3.13 CM
ECHO TV REGURGITANT MAX VELOCITY: 253.41 CM/S
ECHO TV REGURGITANT PEAK GRADIENT: 25.7 MMHG

## 2019-03-08 PROCEDURE — 93306 TTE W/DOPPLER COMPLETE: CPT

## 2019-03-08 NOTE — TELEPHONE ENCOUNTER
Patient has 1 refill on the Xanax. I called [facility] the Rx will be ready for P/U after 11. Mr Mykel Darden has been notified.

## 2019-03-08 NOTE — TELEPHONE ENCOUNTER
Triage for Controlled Substance Refill Request    Pain Diagnosis: Follicular lymphoma     Last Outpatient Visit: 2/19/19    Next Outpatient Visit:  3/19/19     Reason for refill needed outside of office visit? [] Pain escalation requiring increased medication  [x] Appointment not scheduled prior to need for scheduled refill  [] Appointment scheduled but missed or moved prior to need for scheduled refill    Requested Prescriptions      No prescriptions requested or ordered in this encounter       Pharmacy: [facility]/pharmacy #[id number]- [facility] [address]    Medication:oxyCODONE IR (ROXICODONE) 5 mg immediate release tablet       Dose and directions: Take 1 Tab by mouth every four (4) hours as needed for Pain. Max Daily Amount: 30 mg.              Number dispensed:120  Date filled ( or Pharmacy):2/19/19  # left: Will run out on 3/14/19    Medication:  Dose and directions:  Number dispensed:  Date filled ( or Pharmacy):  #left:     reviewed: Yes    Date of Urine Drug Screen:      Opioid Safety Handout given:  Yes    Appropriate for refill:      Action:  Refill request sent

## 2019-03-08 NOTE — TELEPHONE ENCOUNTER
Returned call to Mr Shannon Rico verified [address] [place] [address]. He is requesting refills on the Xanax and Oxycodone tabs. He will be out of the Xanax on Sunday, the Oxycodone on 3/14/19. I informed him Dr Anita Concepcion is out of the office today. I explained if he needs the Xanax sooner the refill can be called into the [facility] as per the Nurse KJ. He would like to pick the Rx up for the Oxycodone Tuesday from the [facility] office. He verbalized understanding.

## 2019-03-12 DIAGNOSIS — C82.33 FOLLICULAR LYMPHOMA GRADE IIIA OF INTRA-ABDOMINAL LYMPH NODES (HCC): ICD-10-CM

## 2019-03-12 RX ORDER — OXYCODONE HYDROCHLORIDE 5 MG/1
5 TABLET ORAL
Qty: 120 TAB | Refills: 0 | Status: SHIPPED | OUTPATIENT
Start: 2019-03-12 | End: 2019-03-19 | Stop reason: SDUPTHER

## 2019-03-18 ENCOUNTER — TELEPHONE (OUTPATIENT)
Dept: PALLATIVE CARE | Age: 44
End: 2019-03-18

## 2019-03-18 NOTE — TELEPHONE ENCOUNTER
Called patient to advise/confirm upcoming appt with Dr. Billy Olson on  3/19/19    at 11:30am at [facility] [place]. No answer so left voicemail. Also advised to please bring in your Drivers License and Insurance Card with you to appointment as well as any prescription pain medication in the original container with you to appt.

## 2019-03-19 ENCOUNTER — OFFICE VISIT (OUTPATIENT)
Dept: PALLATIVE CARE | Age: 44
End: 2019-03-19

## 2019-03-19 VITALS
DIASTOLIC BLOOD PRESSURE: 84 MMHG | SYSTOLIC BLOOD PRESSURE: 123 MMHG | BODY MASS INDEX: 23.04 KG/M2 | WEIGHT: 146.8 LBS | RESPIRATION RATE: 16 BRPM | OXYGEN SATURATION: 100 % | HEART RATE: 81 BPM | HEIGHT: 67 IN | TEMPERATURE: 96.9 F

## 2019-03-19 DIAGNOSIS — R53.83 OTHER FATIGUE: ICD-10-CM

## 2019-03-19 DIAGNOSIS — G89.29 CHRONIC LEFT-SIDED LOW BACK PAIN WITHOUT SCIATICA: ICD-10-CM

## 2019-03-19 DIAGNOSIS — M54.50 CHRONIC LEFT-SIDED LOW BACK PAIN WITHOUT SCIATICA: ICD-10-CM

## 2019-03-19 DIAGNOSIS — C82.33 FOLLICULAR LYMPHOMA GRADE IIIA OF INTRA-ABDOMINAL LYMPH NODES (HCC): ICD-10-CM

## 2019-03-19 DIAGNOSIS — R63.0 POOR APPETITE: ICD-10-CM

## 2019-03-19 DIAGNOSIS — F41.9 ANXIETY: Primary | ICD-10-CM

## 2019-03-19 DIAGNOSIS — F32.9 REACTIVE DEPRESSION: ICD-10-CM

## 2019-03-19 RX ORDER — OXYCODONE HYDROCHLORIDE 5 MG/1
5 TABLET ORAL
Qty: 120 TAB | Refills: 0 | Status: SHIPPED | OUTPATIENT
Start: 2019-03-28 | End: 2019-04-16 | Stop reason: SDUPTHER

## 2019-03-19 NOTE — PROGRESS NOTES
Palliative Medicine Office Visit  Palliative Medicine Nurse Check In  [phone]    Patient Name: Lc Duff. YOB: 1975      Date of Office Visit: 3/19/2019      Patient states: Patient here for a follow up no issues. From Check In Sheet (scanned in Media):  Has a medical provider talked with you about cardiopulmonary resuscitation (CPR)? [x] Yes   [] No   [] Unable to obtain    Nurse reminder to complete or update ACP FlowSheet:    Is ACP on the Problem List?    [] Yes    [x] No  IF ACP Document is ON FILE; Nurse to place ACP on Problem List     Is there an ACP Note in Chart Review/Note? [] Yes    [x] No   If NO: [address] Planning 2/28/2019   Patient's Healthcare Decision Maker is: Legal Next of Kin   Confirm Advance Directive None   Patient Would Like to Complete Advance Directive No   Does the patient have other document types -       Primary Decision Maker: Nichole Santiago - Father - [phone]    Secondary Decision Maker: Sherrill Mosquera - Other Relative - [phone]  Advance Care Planning 3/19/2019   Patient's [place] is: Verbal statement (Legal Next of Kin remains as decision maker)   Confirm Advance Directive None   Patient Would Like to Complete Advance Directive No   Does the patient have other document types -         Is there anything that we should know about you as a person in order to provide you the best care possible? no    Have you been to the ER, urgent care clinic since your last visit? [] Yes   [x] No   [] Unable to obtain    Have you been hospitalized since your last visit? [] Yes   [x] No   [] Unable to obtain    Have you seen or consulted any other health care providers outside of the [address] [place] [address] since your last visit? [] Yes   [x] No   [] Unable to obtain    Functional status (describe):         Last BM: last night.      accessed (date): 3/18/19    Bottle review (for opioid pain medication):  Medication 1: oxyCODONE IR (ROXICODONE) 5 mg immediate release tablet       Date filled: 3/12/19  Directions: Take 1 Tab by mouth every four (4) hours as needed for Pain for up to 14 days.  Max Daily Amount: 30 mg  # filled: 120  # left: 78  # pills taking per day:  Last dose taken:5:30AM    Medication 2:   Date filled:   Directions:   # filled:   # left:   # pills taking per day:  Last dose taken:    Medication 3:   Date filled:   Directions:   # filled:   # left:   # pills taking per day:  Last dose taken:    Medication 4:   Date filled:   Directions:   # filled:   # left:   # pills taking per day:  Last dose taken:

## 2019-03-19 NOTE — ACP (ADVANCE CARE PLANNING)
Advance Care Planning    Discussed creating an Advance Medical Directive with patient, who is not yet ready. Pt stated he would want his father as his primary healthcare agent. Discussed the [place], which would list his father as his NOK. Also discussed advance care plan decisions on the AMD, which patient said he will consider. Patient's father and Legal Next of Kin:  Maria Antonia Olivera  [facility].   [address], [facility], LMSW, [facility]    Palliative   Respecting Choices ® ACP Facilitator  Social Work Supervisee for DHAVAL Saunders [address]  [phone]

## 2019-03-19 NOTE — PROGRESS NOTES
Palliative Medicine Outpatient Psychosocial Assessment  Paramjit: [id number] ([id number])      Reason for Assessment:    [x]Depression  [x]Anxiety  []Caregiver [place]  []Maladaptive Coping with Serious Illness   [x]Other:  Follicular lymphoma    Sources of Information:    [x]Patient  []Family  [x]Staff  [x]Medical Record    Narrative:   Ashanti Cobos Sr. is a 41 y.o. male whose PMH includes: Follicular lymphoma; HTN; Anxiety; NSTEMI event on 2/14/19. Mr. 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 Due to loss of income, he gave up his apartment and moved in with his dad, Maria Antonia Olivera. He added he worries about his dad who just learned he is diabetic. \"His leukemia is in remission, but now he is diabetic. \"  Pt has 2 children: Nisha (16) and Niya Sandoval. (15), who live with their mother, but who he is able to see frequently. Pt says it is his children who bring him rito, and his step-brother who provides emotional support. Pt expressed gratitude for his father who took him in while he awaits SSDI. Pt said he learned his SSDI was denied, so he will re-apply, with more medical information than he provided the first time. Pt said he feels his mood has improved over the last couple of weeks, saying he sleeps and eats well. Assessment:  1. Introduced Palliative Social Work as part of the PM supportive team by providing emotional support, resource referrals, advocacy, assistance with AMDs and counseling. 2. Encouraged expression while listening reflectively. 3. Discussed creating an AMD when he is ready (see ACP note). 4. Explored pt's support system, learning about how much his family members mean to him. 5. Explored coping, learning he depends on his children to help if he is down. 6. Explored how his children are handling his illness. Pt said they seem to be ok and do not express emotion in front of him.     7. Assessed the meaning of spirituality to pt, learning he considers himself spiritual and misses attending the [employer] he once attended in [place]. \"I watch it live on Facebook. \"  Pt said there are people in the [employer] who keep up with him. 8. Assessed his depression, with pt saying he's been depressed since his diagnosis but has felt better recently. He attributes feeling better to time with his children. SW normalized depression when a person receives a CA Dx, letting him know counseling is available through this clinic. 9. Discussed filing an appeal for his SSDI, which pt said he has the energy and motivation to do. He said he now has Medicaid through the [facility] Expansion.     Advance Care Planning:    Primary Decision Maker: Sofía Washburn - Father - [phone]    Secondary Decision Maker: Sherrill Mosquera - Other Relative - [phone]  Advance Care Planning 3/19/2019   Patient's Healthcare Decision Maker is: Verbal statement (Legal Next of Kin remains as decision maker)   Confirm Advance Directive None   Patient Would Like to Complete Advance Directive No   Does the patient have other document types -       Mental Status:    [x]Alert  []Lethargic  []Unresponsive  Oriented to:  [x]Person  [x]Place  [x]Time  [x]Situation      Barriers to Learning:    []Language  []Developmental  []Cognitive  []Altered Mental Status  []Visual/Hearing Impairment  []Unable to Read/Write  []Motivational   []No Barriers Identified  []Other:    Relationship Status:  []Single  []  []Significant Other/Life Partner  [x]  []  []      Living Circumstances:  []Lives Alone  [x]Family/Significant Other in Household  []Roommates  []Children in the Home  []Paid Caregivers  []Assisted Living Facility/Group Home  []Skilled [address]  []Homeless  []Incarcerated  []Environmental/Care Concerns  []Transportation Issues  [] Issues  []Domestic Violence []Other:    Support System:    [x]Strong  []Fair  []Limited    Sleep Habits:   []Poor []Unsatisfactory [x]Satisfactory []Good []Very Good   Specific sleep problems? Pt says he wakens twice a night but always gets back to sleep, sleeping from 6-8 hours a night. Financial/Legal Concerns:    []Uninsured  [x]Limited Income/Resources  []Non-Citizen  []Utility Issues  []Food Insecurity []No Concerns Identified  []Financial POA:    []Other:    Religion/Spiritual/Existential:    [x]Strong Sense of Spirituality  [x]Involved in [address] (although unable to attend)  []Request  Visit  []Expressing Karey Ann  []No Concerns Identified    Coping with Illness:         Patient: Family/Caregiver:   Understanding and Acceptance of Illness/Prognosis  [x] []   Strong Sense of Resilience [] []   Self Reflection [x] []   Engaged Support System [x] []   Does not Readily Discuss Illness [] []   Denial of Terminal Status [] []   Anger [] []   Depression [x] []   Anxiety/Fear [x] []   Bargaining [] []   Recent Diagnosis/Prognosis [x] []   Difficulties with Body Image [] []   Loss of Identity [] []   Excessive Substance Use [] []   Mental Health History [] []   Enmeshed Relationships [] []   History of Loss [x] []   Anticipatory Grief [x] []   Concern for Complicated Grief [] []   Suicidal Ideation or Plan [] []   Caregiver Stress [] []   Unable to assess [] [x]     Goals/Plan:   Pt to file an appeal for his SSDI. Pt open to SW continuing to provide psychosocial support during his clinic visits. SW to follow up with continued assessments, links with appropriate resources, and counseling.     Aarti Condon, SUZANNEW, LMSW, [facility]    Palliative   Respecting Choices ® ACP Facilitator  Social Work Supervisee for [employer]  Palliative Medicine  [phone]

## 2019-03-19 NOTE — PATIENT INSTRUCTIONS
Dear Sergei Loyd. ,    It was a pleasure seeing you today in [facility]. We will see you again in 3 to 4 weeks. Your stated goal: continue treatment for your lymphoma with control of your anxiety and pain    Your described symptoms were: Fatigue: 6 Drowsiness: 2   Depression: 0 Pain: 6   Anxiety: 2 Nausea: 0   Anorexia: 0 Dyspnea: 0   Best Well-Bein Constipation: No   Other Problem (Comment): 0       This is the plan we talked about:    1. Anxiety  -Continue escitalopram (Lexapro) 10-mg daily  -We talked today about decreasing your Xanax dose due to the risks associated with using this medicine when taking oxycodone  -Continue alprazolam 1-mg twice daily as needed. -You met today with Genoveva Rodriguez, our clinical  to discuss coping strategies for your anxiety    2. Pain   -You have pain in your back related to the cancer  - Continue oxycodone 5-mg: take 1 to 2 tabs every 4 hours as needed. You are currently taking 5 to 6 pills a day to control your pain.  -We will work with your insurance company about covering this prescription as written    3. Fatigue  -You are most fatigued and feel weak the first week after your cancer treatment  -You are trying to get more exercise by walking around the neighborhood which may help with your overall energy  -You are now going to cardiac rehab    4.  Rene Cunningham moving your bowels regularly    This is what you have shared with us about Advance Care Planning:      Primary Decision Maker: Cinthia Gifford - Father - [phone]    Secondary Decision Maker: Sherrill Mosquera - Other Relative - [phone]  [] Named in a scanned document   [x] Legal Next of Kin  [] Guardian    ACP documents you current have include:  [] Advance Directive or Living Will  [] Durable Do Not Resuscitate  [] Physician Orders for Scope of Treatment (POST)  [] Medical Power of   [] Other      The Palliative Medicine Team is here to support you and your family.          Sincerely,      Marti Cruz MD and the Palliative Medicine Team

## 2019-03-19 NOTE — PROGRESS NOTES
Palliative Medicine Outpatient Services  [place]: [phone] ([phone])    Patient Name: Cortez Westbrook YOB: 1975    Date of Current Visit: 03/19/19  Location of Current Visit:    [] [facility] Office  [x] [facility] Office  [] [address] [place] [address]  [] Home  [] Other:      Date of Initial Visit: 2/19/19   Referral from: Sherly Mendoza MD  Primary Care Physician: None      SUMMARY:   Cortez Westbrook is a 41y.o. year old with high-grade follicular lymphoma, who was referred to Palliative Medicine by Dr. Karla Warren for symptom management and supportive care. He was diagnosed in 11/2018 after presenting with back pain. He is currently receiving systemic chemotherapy. He suffered cardiac arrest during cycle #3 due to previously undiagnosed severe stenosis of the LAD s/p PTCA and stent. He has since resumed chemotherapy. The patients social history includes: he is single. He lives with his father in [place]. He has 3 teenage children who live with their mother and with whom he has close contact. 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 Palliative Medicine is addressing the following current patient/family concerns: anxiety, depression, left mid- and low back pain related to malignancy, poor appetite, fatigue, advanced care planning. Initial Referral Intake note from Casandra Lu RN reviewed prior to visit   PALLIATIVE DIAGNOSES:       ICD-10-CM ICD-9-CM    1. Anxiety F41.9 300.00    2. Follicular lymphoma grade IIIa of intra-abdominal lymph nodes (HCC) C82.33 202.03 oxyCODONE IR (ROXICODONE) 5 mg immediate release tablet   3. Reactive depression F32.9 300.4    4. Chronic left-sided low back pain without sciatica M54.5 724.2     G89.29 338.29    5. Other fatigue R53.83 780.79    6. Poor appetite R63.0 783.0           PLAN:   Patient Instructions     Dear Cortez Doss. ,    It was a pleasure seeing you today in [facility]. We will see you again in 3 to 4 weeks.     Your stated goal: continue treatment for your lymphoma with control of your anxiety and pain    Your described symptoms were: Fatigue: 6 Drowsiness: 2   Depression: 0 Pain: 6   Anxiety: 2 Nausea: 0   Anorexia: 0 Dyspnea: 0   Best Well-Bein Constipation: No   Other Problem (Comment): 0       This is the plan we talked about:    1. Anxiety  -Continue escitalopram (Lexapro) 10-mg daily  -We talked today about decreasing your Xanax dose due to the risks associated with using this medicine when taking oxycodone  -Continue alprazolam 1-mg twice daily as needed. -You met today with Victor Manuel Garcia, our clinical  to discuss coping strategies for your anxiety    2. Pain   -You have pain in your back related to the cancer  - Continue oxycodone 5-mg: take 1 to 2 tabs every 4 hours as needed. You are currently taking 5 to 6 pills a day to control your pain.  -We will work with your insurance company about covering this prescription as written    3. Fatigue  -You are most fatigued and feel weak the first week after your cancer treatment  -You are trying to get more exercise by walking around the neighborhood which may help with your overall energy  -You are now going to cardiac rehab    4. Wilnerjh Claude moving your bowels regularly    This is what you have shared with us about Advance Care Planning:      Primary Decision Maker: Nichole Santiago - Father - [phone]    Secondary Decision Maker: Sherrill Mosquera - Other Relative - [phone]  [] Named in a scanned document   [x] Legal Next of Kin  [] Guardian    ACP documents you current have include:  [] Advance Directive or Living Will  [] Durable Do Not Resuscitate  [] Physician Orders for Scope of Treatment (POST)  [] Medical Power of   [] Other      The Palliative Medicine Team is here to support you and your family.          Sincerely,      Sandrita Del Angel MD and the Palliative Medicine Team      Counseling and Coordination: note routed to care team       GOALS OF CARE / TREATMENT PREFERENCES:   [====Goals of Care====]  GOALS OF CARE:  Patient / health care proxy stated goals: See Patient Instructions / Summary    TREATMENT PREFERENCES:   Code Status:  [x] Attempt Resuscitation       [] Do Not Attempt Resuscitation    Advance Care Planning:  [x] The Pall Med Interdisciplinary Team has updated the ACP Navigator with Decision Maker and Patient Capacity      Primary Decision Maker: Kendall Trent - Father - [phone]    Secondary Decision Maker: Sherrill Mosquera - Other Relative - [phone]  [] Named in a scanned document   [x] Legal Next of Kin  [] Guardian    Other:  (If patient appropriate for POST, consider using PALLPOST smart phrase here)    The palliative care team has discussed with patient / health care proxy about goals of care / treatment preferences for patient.  [====Goals of Care====]     PRESCRIPTIONS GIVEN:     Medications Ordered Today   Medications    oxyCODONE IR (ROXICODONE) 5 mg immediate release tablet     Sig: Take 1 Tab by mouth every four (4) hours as needed for Pain for up to 20 days. Max Daily Amount: 30 mg.      Dispense:  120 Tab     Refill:  0           FOLLOW UP:     Future Appointments   Date Time Provider Dahlia Epps   3/21/2019 10:30 AM SS INF3 CH4 <1H [facility]   3/21/2019 10:45 AM Hima Blake NP [facility] SCHED   3/28/2019  9:00 AM SS INF3 CH1 >4H [facility]   3/28/2019  9:45 AM Hima Blake NP [facility] SCHED   3/29/2019  9:00 AM SS INF3 CH1 >4H [facility]   4/25/2019  9:00 AM SS INF3 CH2 >4H [facility]   4/26/2019  9:00 AM [facility]           PHYSICIANS INVOLVED IN CARE:   Patient Care Team:  None as PCP - Gloria Ford MD (Hematology and Oncology)  Luis Angel Booth MD as Physician (Palliative Medicine)  Luis Angel Booth MD as Physician (Palliative Medicine)       HISTORY:   Reviewed patient-completed ESAS and advance care planning form. Reviewed patient record in prescription monitoring program.    CHIEF COMPLAINT:   Chief Complaint   Patient presents with    Back Pain       HPI/SUBJECTIVE:    The patient is: [x] Verbal / [] Nonverbal     He's doing OK. His anxiety is much better. He's taking Lexapro, no side-effects, and Xanax three times a day. He doesn't feel sad or depressed, though. He spends most of his time at home. His father, who is retired, is there most of the time. He watches TV and just fools around. He's trying to exercise more by walking around the neighborhood. He's going to rehab (cardiac) now. His pain is better. He's taking 1 oxycodone 5 to 6 times a day. He still has some pain in his groin but it's much better since they stuck the needle in to stop the leak. He hasn't had any chest pain. He feels tired a lot of the time. This is worse the week after chemo. He feels weak then, too, but it gets better after a week. He's coming for his treatments every 4 weeks. He's eating OK. He's moving his bowels regularly. He hasn't felt short of breath. He sees his three teen-age children frequently (they live with their mother). His children give him a lot of strength.     Clinical Pain Assessment (nonverbal scale for nonverbal patients):   [++++ Clinical Pain Assessment++++]  [++++Pain Severity++++]: Pain: 6  [++++Pain Character++++]: stabbing pain in back, groin feels like a pulled muscle  [++++Pain Duration++++]: months for back pain, weeks for groin pain  [++++Pain Effect++++]: little  [++++Pain Factors++++]: oxycodone helps with back pain, groin pain elicited by standing and walking  [++++Pain Frequency++++]: constant back pain with varying intensity, intermittent groin pain  [++++Pain Location++++]: left lower back, left groin and leg  [++++ Clinical Pain Assessment++++]       FUNCTIONAL ASSESSMENT:     Palliative Performance Scale (PPS):  PPS: 80       PSYCHOSOCIAL/SPIRITUAL SCREENING: Any spiritual / Jainism concerns:  [] Yes /  [x] No    Caregiver Burnout:  [] Yes /  [x] No /  [] No Caregiver Present      Anticipatory grief assessment:   [x] Normal  / [] Maladaptive       ESAS Anxiety: Anxiety: 2    ESAS Depression: Depression: 0       REVIEW OF SYSTEMS:     The following systems were [x] reviewed / [] unable to be reviewed  Systems: constitutional, ears/nose/mouth/throat, respiratory, gastrointestinal, genitourinary, musculoskeletal, integumentary, neurologic, psychiatric, endocrine. Positive findings noted below. Modified ESAS Completed by: provider   Fatigue: 6 Drowsiness: 2   Depression: 0 Pain: 6   Anxiety: 2 Nausea: 0   Anorexia: 0 Dyspnea: 0   Best Well-Bein Constipation: No   Other Problem (Comment): 0          PHYSICAL EXAM:     Wt Readings from Last 3 Encounters:   19 146 lb 12.8 oz (66.6 kg)   19 147 lb (66.7 kg)   19 147 lb (66.7 kg)     Blood pressure 123/84, pulse 81, temperature 96.9 °F (36.1 °C), temperature source Oral, resp. rate 16, height 5' 7\" (1.702 m), weight 146 lb 12.8 oz (66.6 kg), SpO2 100 %.   Last bowel movement: See Nursing Note    Constitutional: sitting in chair, ill-appearing  Eyes: pupils equal, anicteric  ENMT: no nasal discharge, moist mucous membranes  Cardiovascular: regular rhythm, no peripheral edema  Respiratory: breathing not labored, symmetric  Gastrointestinal: soft non-tender, +bowel sounds  Musculoskeletal: no deformity, no tenderness to palpation  Skin: warm, dry  Neurologic: following commands, moving all extremities  Psychiatric: full affect, no hallucinations  Other:       HISTORY:     Past Medical History:   Diagnosis Date    Anxiety     Cancer [facility])     lymphoma 2018 receiving chemo    Chronic pain     lower back- lymphoma    Hyperlipidemia     Hypertension     Lymphadenopathy 2018      Past Surgical History:   Procedure Laterality Date    HX HEART CATHETERIZATION  2019    HX OTHER SURGICAL 02/2019    cardiac stent    IR INJECTION PSEUDOANEURYSM  2/26/2019      Family History   Problem Relation Age of Onset    Hypertension Father     Diabetes Father     Diabetes Mother       History reviewed, no pertinent family history. Social History     Tobacco Use    Smoking status: Current Every Day Smoker     Packs/day: 1.00     Years: 20.00     Pack years: 20.00    Smokeless tobacco: Never Used   Substance Use Topics    Alcohol use: No     Frequency: Never     No Known Allergies   Current Outpatient Medications   Medication Sig    [START ON 3/28/2019] oxyCODONE IR (ROXICODONE) 5 mg immediate release tablet Take 1 Tab by mouth every four (4) hours as needed for Pain for up to 20 days. Max Daily Amount: 30 mg.    ALPRAZolam (XANAX) 1 mg tablet Take 1 Tab by mouth three (3) times daily as needed for Anxiety. Max Daily Amount: 3 mg.  escitalopram oxalate (LEXAPRO) 10 mg tablet Take 1 Tab by mouth daily.  atorvastatin (LIPITOR) 80 mg tablet Take 1 Tab by mouth nightly.  clopidogrel (PLAVIX) 75 mg tab 1 Tab by Per NG tube route daily.  metoprolol succinate (TOPROL-XL) 25 mg XL tablet Take 1 Tab by mouth daily.  lisinopril (PRINIVIL, ZESTRIL) 10 mg tablet Take 1 Tab by mouth daily. DO NOT TAKE for 5 days    aspirin delayed-release (ASPIR-81) 81 mg tablet Take 1 Tab by mouth daily.  L. acidoph & paracasei- S therm- Bifido (AUSTIN-Q/RISAQUAD) 8 billion cell cap cap Take 1 Cap by mouth daily.  magic mouthwash solution Take 15-30 mL by mouth four (4) times daily. Magic mouth wash   Maalox  Lidocaine 2% viscous   Diphenhydramine oral solution    Swish and spit for mouth pain, ok to swallow for throat pain     Pharmacy to mix equal portions of ingredients to a total volume as indicated in the dispense amount.  prochlorperazine (COMPAZINE) 10 mg tablet Take 1 Tab by mouth every six (6) hours as needed.  senna-docusate (PERICOLACE) 8.6-50 mg per tablet Take 1 Tab by mouth daily.     ondansetron hcl (ZOFRAN) 8 mg tablet Take 1 Tab by mouth every eight (8) hours as needed for Nausea. (Patient taking differently: Take  by mouth every eight (8) hours as needed for Nausea.)    predniSONE (DELTASONE) 20 mg tablet Take 100 mg by mouth daily (with breakfast). (on days 1-5 of each chemo cycle)    naloxone (NARCAN) 4 mg/actuation nasal spray Use 1 spray intranasally, then discard. Repeat with new spray every 2 min as needed for opioid overdose symptoms, alternating nostrils. No current facility-administered medications for this visit. LAB DATA REVIEWED:     Lab Results   Component Value Date/Time    WBC 10.0 02/28/2019 09:56 AM    HGB 10.7 (L) 02/28/2019 09:56 AM    PLATELET 234 37/58/7836 09:56 AM     Lab Results   Component Value Date/Time    Sodium 140 02/28/2019 09:56 AM    Potassium 3.7 02/28/2019 09:56 AM    Chloride 107 02/28/2019 09:56 AM    CO2 27 02/28/2019 09:56 AM    BUN 12 02/28/2019 09:56 AM    Creatinine 1.16 02/28/2019 09:56 AM    Calcium 8.7 02/28/2019 09:56 AM    Magnesium 1.7 01/12/2019 04:05 AM    Phosphorus 2.0 (L) 01/12/2019 04:05 AM      Lab Results   Component Value Date/Time    AST (SGOT) 14 (L) 02/28/2019 09:56 AM    Alk.  phosphatase 125 (H) 02/28/2019 09:56 AM    Protein, total 6.3 (L) 02/28/2019 09:56 AM    Albumin 3.4 (L) 02/28/2019 09:56 AM    Globulin 2.9 02/28/2019 09:56 AM     Lab Results   Component Value Date/Time    INR 1.1 02/22/2019 08:18 PM    Prothrombin time 10.8 02/22/2019 08:18 PM    aPTT 27.8 02/22/2019 08:18 PM      No results found for: IRON, FE, TIBC, IBCT, PSAT, FERR        CONTROLLED SUBSTANCES SAFETY ASSESSMENT (IF ON CONTROLLED SUBSTANCES):     Reviewed opioid safety handout:  [x] Yes   [] No  24 hour opioid dose >150mg morphine equivalent/day:  [] Yes   [x] No  Benzodiazepines:  [x] Yes   [] No  Sleep apnea:  [] Yes   [x] No  Urine Toxicology Testing within last 6 months:  [] Yes   [] No  History of or new aberrant medication taking behaviors:  [] Yes   [] No  Has Narcan been prescribed [] Yes   [x] No          Total time:   Counseling / coordination time:   > 50% counseling / coordination?:

## 2019-03-21 ENCOUNTER — HOSPITAL ENCOUNTER (OUTPATIENT)
Dept: INFUSION THERAPY | Age: 44
Discharge: HOME | End: 2019-03-21
Payer: COMMERCIAL

## 2019-03-21 ENCOUNTER — OFFICE VISIT (OUTPATIENT)
Dept: ONCOLOGY | Age: 44
End: 2019-03-21

## 2019-03-21 VITALS
HEART RATE: 65 BPM | SYSTOLIC BLOOD PRESSURE: 122 MMHG | WEIGHT: 147.6 LBS | OXYGEN SATURATION: 99 % | HEIGHT: 67 IN | BODY MASS INDEX: 23.17 KG/M2 | TEMPERATURE: 96.6 F | DIASTOLIC BLOOD PRESSURE: 79 MMHG

## 2019-03-21 VITALS
TEMPERATURE: 96.6 F | DIASTOLIC BLOOD PRESSURE: 79 MMHG | HEART RATE: 65 BPM | SYSTOLIC BLOOD PRESSURE: 122 MMHG | RESPIRATION RATE: 18 BRPM

## 2019-03-21 DIAGNOSIS — C82.90 FOLLICULAR LYMPHOMA, UNSPECIFIED FOLLICULAR LYMPHOMA TYPE, UNSPECIFIED BODY REGION (HCC): Primary | ICD-10-CM

## 2019-03-21 DIAGNOSIS — I21.4 NSTEMI (NON-ST ELEVATED MYOCARDIAL INFARCTION) (HCC): ICD-10-CM

## 2019-03-21 DIAGNOSIS — I72.9 PSEUDOANEURYSM FOLLOWING PROCEDURE (HCC): ICD-10-CM

## 2019-03-21 DIAGNOSIS — G89.3 ACUTE NEOPLASM-RELATED PAIN: ICD-10-CM

## 2019-03-21 DIAGNOSIS — T81.718A PSEUDOANEURYSM FOLLOWING PROCEDURE (HCC): ICD-10-CM

## 2019-03-21 DIAGNOSIS — C82.33 FOLLICULAR LYMPHOMA GRADE IIIA OF INTRA-ABDOMINAL LYMPH NODES (HCC): Primary | ICD-10-CM

## 2019-03-21 LAB
ALBUMIN SERPL-MCNC: 3.6 G/DL (ref 3.5–5)
ALBUMIN/GLOB SERPL: 1.2 {RATIO} (ref 1.1–2.2)
ALP SERPL-CCNC: 139 U/L (ref 45–117)
ALT SERPL-CCNC: 38 U/L (ref 12–78)
ANION GAP SERPL CALC-SCNC: 6 MMOL/L (ref 5–15)
AST SERPL-CCNC: 14 U/L (ref 15–37)
BASOPHILS # BLD: 0.1 K/UL (ref 0–0.1)
BASOPHILS NFR BLD: 2 % (ref 0–1)
BILIRUB SERPL-MCNC: 0.3 MG/DL (ref 0.2–1)
BUN SERPL-MCNC: 11 MG/DL (ref 6–20)
BUN/CREAT SERPL: 10 (ref 12–20)
CALCIUM SERPL-MCNC: 8.9 MG/DL (ref 8.5–10.1)
CHLORIDE SERPL-SCNC: 107 MMOL/L (ref 97–108)
CO2 SERPL-SCNC: 26 MMOL/L (ref 21–32)
CREAT SERPL-MCNC: 1.09 MG/DL (ref 0.7–1.3)
DIFFERENTIAL METHOD BLD: ABNORMAL
EOSINOPHIL # BLD: 1 K/UL (ref 0–0.4)
EOSINOPHIL NFR BLD: 13 % (ref 0–7)
ERYTHROCYTE [DISTWIDTH] IN BLOOD BY AUTOMATED COUNT: 16.8 % (ref 11.5–14.5)
GLOBULIN SER CALC-MCNC: 3 G/DL (ref 2–4)
GLUCOSE SERPL-MCNC: 133 MG/DL (ref 65–100)
HCT VFR BLD AUTO: 39 % (ref 36.6–50.3)
HGB BLD-MCNC: 12.8 G/DL (ref 12.1–17)
IMM GRANULOCYTES # BLD AUTO: 0 K/UL (ref 0–0.04)
IMM GRANULOCYTES NFR BLD AUTO: 0 % (ref 0–0.5)
LYMPHOCYTES # BLD: 1.4 K/UL (ref 0.8–3.5)
LYMPHOCYTES NFR BLD: 19 % (ref 12–49)
MCH RBC QN AUTO: 32.1 PG (ref 26–34)
MCHC RBC AUTO-ENTMCNC: 32.8 G/DL (ref 30–36.5)
MCV RBC AUTO: 97.7 FL (ref 80–99)
MONOCYTES # BLD: 0.7 K/UL (ref 0–1)
MONOCYTES NFR BLD: 9 % (ref 5–13)
NEUTS SEG # BLD: 4.2 K/UL (ref 1.8–8)
NEUTS SEG NFR BLD: 57 % (ref 32–75)
NRBC # BLD: 0 K/UL (ref 0–0.01)
NRBC BLD-RTO: 0 PER 100 WBC
PLATELET # BLD AUTO: 148 K/UL (ref 150–400)
PMV BLD AUTO: 11.7 FL (ref 8.9–12.9)
POTASSIUM SERPL-SCNC: 3.6 MMOL/L (ref 3.5–5.1)
PROT SERPL-MCNC: 6.6 G/DL (ref 6.4–8.2)
RBC # BLD AUTO: 3.99 M/UL (ref 4.1–5.7)
SODIUM SERPL-SCNC: 139 MMOL/L (ref 136–145)
WBC # BLD AUTO: 7.5 K/UL (ref 4.1–11.1)

## 2019-03-21 PROCEDURE — 80053 COMPREHEN METABOLIC PANEL: CPT

## 2019-03-21 PROCEDURE — 77030012965 HC NDL HUBR BBMI -A

## 2019-03-21 PROCEDURE — 36415 COLL VENOUS BLD VENIPUNCTURE: CPT

## 2019-03-21 PROCEDURE — 85025 COMPLETE CBC W/AUTO DIFF WBC: CPT

## 2019-03-21 PROCEDURE — 74011250636 HC RX REV CODE- 250/636: Performed by: INTERNAL MEDICINE

## 2019-03-21 PROCEDURE — 96523 IRRIG DRUG DELIVERY DEVICE: CPT

## 2019-03-21 RX ORDER — SODIUM CHLORIDE 0.9 % (FLUSH) 0.9 %
5-10 SYRINGE (ML) INJECTION AS NEEDED
Status: DISCONTINUED | OUTPATIENT
Start: 2019-03-21 | End: 2019-03-22 | Stop reason: HOSPADM

## 2019-03-21 RX ORDER — HEPARIN 100 UNIT/ML
500 SYRINGE INTRAVENOUS AS NEEDED
Status: DISCONTINUED | OUTPATIENT
Start: 2019-03-21 | End: 2019-03-22 | Stop reason: HOSPADM

## 2019-03-21 RX ADMIN — Medication 500 UNITS: at 10:45

## 2019-03-21 RX ADMIN — Medication 10 ML: at 10:45

## 2019-03-21 NOTE — PROGRESS NOTES
Charisse Carranza is a 41 y.o. male here for follow up of lymphoma. Patient with complaints of back pain, rates as a 6 out of 10.

## 2019-03-21 NOTE — PROGRESS NOTES
[address] [facility]  [phone]    Hematology / Oncology Established Visit    Reason for Visit:   Tamra Yeung is a 41 y.o. male who comes in for f/u of lymphoma. Hematology Oncology Treatment History:     Diagnosis: Follicular lymphoma    Stage: IV    Pathology:   11/13/18 right inguinal LN excision: Follicular lymphoma, high-grade (grade 3a of 3). Comment   The delaney architecture is entirely effaced by atypical lymphocytic proliferation with prominent nodular growth pattern. The majority of the atypical lymphocytes are small to medium in size and have irregular nuclear outlines and inconspicuous nucleoli, morphologically consistent with centrocytes. Scattered large lymphocytes with prominent nucleoli, consistent with centroblasts are identified (> 15 per high-power field). Focal increase in mitotic figures is noted. Occasional follicular dendritic cells are seen. By immunohistochemistry, the atypical lymphocytes are positive for CD20, PAX5, CD10, BCL6 and BCL2 (weak, focal) and negative for MUM1. CD3, CD5 and CD43 stain numerous background T lymphocytes. Ki-67 reveals a high proliferation index in the neoplastic follicles (overall 08-82%). Clinical history indicates 14 cm retroperitoneal mass with bilateral inguinal lymphadenopathy. In summary, the combined morphologic and phenotypic findings are diagnostic of a high-grade follicular lymphoma (grade 3a of 3). There is no evidence of diffuse large B-cell lymphoma. Flow cytometry analysis:   Monoclonal B-cell population (47 % of all cells) with mild increase in side and forward scatter properties expressing CD19, CD20, CD23 and CD10 with surface lambda light chain restriction. No phenotypically aberrant T-cell population. Flow cytometry was performed at [facility]     Prior Treatment: Obinutuzumab-CHOP.  Obinutuzumab: 1000 mg weekly on days 1, 8, 15 for cycle 1, then 1000 mg on day 1 q21 days for cycles 2-6, then monotherapy 1000 mg every 21 days for cycle 7, 8 with Cyclophosphamide 750mg/m2, Doxorubicin 50mg/m2, Vincristine 1.4mg/m2 on day 1 and Prednisone 100mg on Days 1-5, every 21 days for a total of 2 cycles completed late 1/2019. Regimen discontinued due to NSTEMI. Current Treatment: Obinutuzumab + Bendamustine: 1000 mg Obinutuzumab on day 1 + Bendamustine 90mg/m2 on days 1-2 on a 28-day cycle x 4 cycles     History of Present Illness:   Mr. Zapata is a 42 y/o male with HTN who comes in for evaluation of Follicular lymphoma. He states he was in his usual state of health in early November 2018, but then he developed low back pain and presented to the ER. The pain was described as constant, radiating to the buttocks, without exacerbating or alleviating factors, associated w/ increased urination, no n/v/d, no dysuria, no fever, he's unsure about weight loss. Work-up at outside hospital revealed a retroperitoneal mass seen on CT imaging, and he was transferred to [facility] for further work-up. CT there showed a large retroperitoneal mass encircling the aorta with invasion of the left renal hilum and left adrenal gland. There were bilateral inguinal lymph nodes and moderate left hydronephrosis. He was evaluated while at [facility] and was noted to have palpable nodes in his groin for approximately the past 1 month. No testicular mass, anorexia, weight loss, fevers, night sweats, chest pain, shortness of breath, abdominal pain or nausea. He underwent excisional LN biopsy of right inguinal LN. He was told that he had likely lymphoma. He was advised to follow up as outpatient for PET scan, bone marrow biopsy. However, patient states he was lost to f/u. He never received any calls or appointment details. His aunt urged patient to seek care elsewhere and his PCP recommended [facility].  At our first meeting on 12/13/18, he tells me that he was working full time, feeling well until early Nov 2018. When he developed the left lower back pain, he went to [facility] and [facility]. No fevers, chills, night sweats. He has lost 15 lbs in the past month due to low appetite. No n/v/d. No current constipation. No CP, SOB. No h/o cardiac disease aside from HTN, Hyperlipiemia. No hematochezia/melena, hematuria. He has HTN and XOL, which he was taking meds for, but has run out. Has no PCP currently. He is uninsured. He works as a cook, currently working part time. He has children aged 12 and 13. Interval history: Tolerated cycle 1 of chemo regimen (Riddhi-O) and comes in for aamir counts. Reports left groin and thigh swelling have decreased and pain has decreased. Denies nausea. Eating well, no issues with n/v/d, constipation. Denies recent infections. Denies SOB, dizziness, or chest pain. FAMILY HISTORY:  His father has a history of leukemia, currently in remission, treated at [facility]. LYMPHATICS:  Bilateral palpable inguinal adenopathy. Past Medical History:   Diagnosis Date    Anxiety     Cancer [facility])     lymphoma Nov 2018 receiving chemo    Chronic pain     lower back- lymphoma    Hyperlipidemia     Hypertension     Lymphadenopathy 11/12/2018      Past Surgical History:   Procedure Laterality Date    HX HEART CATHETERIZATION  02/2019    HX OTHER SURGICAL  02/2019    cardiac stent    IR INJECTION PSEUDOANEURYSM  2/26/2019      Social History     Tobacco Use    Smoking status: Current Every Day Smoker     Packs/day: 1.00     Years: 20.00     Pack years: 20.00    Smokeless tobacco: Never Used   Substance Use Topics    Alcohol use: No     Frequency: Never      Family History   Problem Relation Age of Onset    Hypertension Father     Diabetes Father     Diabetes Mother      Current Outpatient Medications   Medication Sig    [START ON 3/28/2019] oxyCODONE IR (ROXICODONE) 5 mg immediate release tablet Take 1 Tab by mouth every four (4) hours as needed for Pain for up to 20 days. Max Daily Amount: 30 mg.  ALPRAZolam (XANAX) 1 mg tablet Take 1 Tab by mouth three (3) times daily as needed for Anxiety. Max Daily Amount: 3 mg.  escitalopram oxalate (LEXAPRO) 10 mg tablet Take 1 Tab by mouth daily.  atorvastatin (LIPITOR) 80 mg tablet Take 1 Tab by mouth nightly.  clopidogrel (PLAVIX) 75 mg tab 1 Tab by Per NG tube route daily.  metoprolol succinate (TOPROL-XL) 25 mg XL tablet Take 1 Tab by mouth daily.  lisinopril (PRINIVIL, ZESTRIL) 10 mg tablet Take 1 Tab by mouth daily. DO NOT TAKE for 5 days    L. acidoph & paracasei- S therm- Bifido (AUSTIN-Q/RISAQUAD) 8 billion cell cap cap Take 1 Cap by mouth daily.  magic mouthwash solution Take 15-30 mL by mouth four (4) times daily. Magic mouth wash   Maalox  Lidocaine 2% viscous   Diphenhydramine oral solution    Swish and spit for mouth pain, ok to swallow for throat pain     Pharmacy to mix equal portions of ingredients to a total volume as indicated in the dispense amount.  prochlorperazine (COMPAZINE) 10 mg tablet Take 1 Tab by mouth every six (6) hours as needed.  senna-docusate (PERICOLACE) 8.6-50 mg per tablet Take 1 Tab by mouth daily.  ondansetron hcl (ZOFRAN) 8 mg tablet Take 1 Tab by mouth every eight (8) hours as needed for Nausea. (Patient taking differently: Take  by mouth every eight (8) hours as needed for Nausea.)    predniSONE (DELTASONE) 20 mg tablet Take 100 mg by mouth daily (with breakfast). (on days 1-5 of each chemo cycle)    naloxone (NARCAN) 4 mg/actuation nasal spray Use 1 spray intranasally, then discard. Repeat with new spray every 2 min as needed for opioid overdose symptoms, alternating nostrils.  aspirin delayed-release (ASPIR-81) 81 mg tablet Take 1 Tab by mouth daily. No current facility-administered medications for this visit. No Known Allergies     Review of Systems: A complete review of systems was obtained, negative except as described above.     Physical Exam:     Visit Vitals  /79 Pulse 65   Temp 96.6 °F (35.9 °C)   Ht 5' 7\" (1.702 m)   Wt 147 lb 9.6 oz (67 kg)   SpO2 99%   BMI 23.12 kg/m²     ECOG PS: 0  General: Well developed, no acute distress  Eyes: PERRLA, EOMI, anicteric sclerae  HENT: Atraumatic, OP clear, poor dentition, multiple dental caries, no erythema,TMs intact without erythema  Neck: Supple  Lymphatic: No supraclavicular, axillary. R cervical 2cm LN absent. Bilateral small 2-3cm palpable inguinal adenopathy  Respiratory: CTAB, normal respiratory effort  CV: Normal rate, regular rhythm, no murmurs, no peripheral edema  GI: Soft, nontender, nondistended, no masses, no hepatomegaly, no splenomegaly  MS: Normal gait and station. Digits without clubbing or cyanosis. Skin: No rashes, ecchymoses, or petechiae. Normal temperature, turgor, and texture. Large hematoma present on inner left thigh and along groin, firm to palpation. Neuro/Psych: Alert, oriented. 5/5 strength in all 4 extremities. Appropriate affect, normal judgment/insight. Results:     Lab Results   Component Value Date/Time    WBC 7.5 03/21/2019 10:46 AM    HGB 12.8 03/21/2019 10:46 AM    HCT 39.0 03/21/2019 10:46 AM    PLATELET 553 (L) 89/46/9663 10:46 AM    MCV 97.7 03/21/2019 10:46 AM    ABS.  NEUTROPHILS 4.2 03/21/2019 10:46 AM    Hemoglobin (POC) 15.0 06/05/2009 02:13 PM    Hematocrit (POC) 39 02/14/2019 01:24 PM     Lab Results   Component Value Date/Time    Sodium 139 03/21/2019 10:46 AM    Potassium 3.6 03/21/2019 10:46 AM    Chloride 107 03/21/2019 10:46 AM    CO2 26 03/21/2019 10:46 AM    Glucose 133 (H) 03/21/2019 10:46 AM    BUN 11 03/21/2019 10:46 AM    Creatinine 1.09 03/21/2019 10:46 AM    GFR est AA >60 03/21/2019 10:46 AM    GFR est non-AA >60 03/21/2019 10:46 AM    Calcium 8.9 03/21/2019 10:46 AM    Sodium (POC) 136 02/14/2019 01:24 PM    Potassium (POC) 3.9 02/14/2019 01:24 PM    Chloride (POC) 102 02/14/2019 01:24 PM    Glucose (POC) 249 (H) 02/15/2019 10:21 PM    BUN (POC) 14 02/14/2019 01:24 PM    Creatinine (POC) 0.9 2019 01:24 PM    Calcium, ionized (POC) 1.24 2019 01:24 PM     Lab Results   Component Value Date/Time    Bilirubin, total 0.3 2019 10:46 AM    ALT (SGPT) 38 2019 10:46 AM    AST (SGOT) 14 (L) 2019 10:46 AM    Alk. phosphatase 139 (H) 2019 10:46 AM    Protein, total 6.6 2019 10:46 AM    Albumin 3.6 2019 10:46 AM    Globulin 3.0 2019 10:46 AM     No results found for: IRON, FE, TIBC, IBCT, PSAT, FERR    No results found for: B12LT, FOL, RBCF  Lab Results   Component Value Date/Time    TSH 1.53 2016 04:40 AM     18:     Lab Results   Component Value Date/Time    Hepatitis A, IgM NONREACTIVE 2018 04:53 PM    Hepatitis B surface Ag <0.10 2018 04:53 PM    Hepatitis B core, IgM NONREACTIVE 2018 04:53 PM         Imagin/9/18 Abd/pelvis CT: IMPRESSION:  1. Interval development of a large retroperitoneal mass encircling the aorta with invasion of the left renal hilum and left adrenal gland. Several adjacent lymph nodes are seen extending into the peritoneum and underneath the  diaphragmatic natalie. This most likely represents lymphoma. 2. Several new bilateral enlarged inguinal lymph nodes also likely representing lymphoma. 3. Moderate left hydronephrosis with a delayed renal nephrogram related to decreased renal function. This is related to the invasion of the renal hilum. 18 Chest CT: IMPRESSION:  Trace left pleural effusion. Bilateral lower lobe atelectasis. Large  retroperitoneal mass lesion again demonstrated. PET 18:  FINDINGS:  HEAD/NECK: Right palatine tonsil intense hypermetabolism, max SUV 18. Multilevel  bilateral cervical adenopathy, with max SUV 12 in a left supraclavicular node. Cerebral evaluation is limited by normal intense activity. CHEST: Solitary hypermetabolic left axillary node, max SUV 11.   ABDOMEN/PELVIS: Bulky retroperitoneal mass max SUV 27, with several additional  small active abdomino-pelvic nodes. Bilateral inguinal nodes with max SUV 12 on  the left. SKELETON: No foci of abnormal hypermetabolism in the axial and visualized  appendicular skeleton. IMPRESSION:   1. Right palatine tonsil tumor involvement (Deauville 5). 2. Bilateral cervical delaney involvement (Deauville 5). 3. Left axillary node involvement (Deauville 5). 4. Bulky retroperitoneal lymphoma mass and additional smaller hypermetabolic  abdomino-pelvic nodes (Deauville 5). 5. Bilateral inguinal delaney involvement (Deauville 5). Deauville Five Point Scale  1. No uptake or no residual uptake (when used interim)  2. Slight uptake, but below blood pool (mediastinum)  3. Uptake above mediastinal, but below/equal to uptake in the liver  4. Uptake slightly to moderately higher than liver  5. Markedly increased uptake or any new lesion (on response evaluation)  Each FDG-avid (or previously FDG avid) lesion is rated independently. Reference values:  Mediastinal blood pool: 2.1 SUV  Liver (background): 2.2 SUV    PET/CT 2/05/19:   IMPRESSION:  1. No Foci of Abnormal Hypermetabolism (Deauville 1). 2. Resolved activity in the right palatine tonsil, bilateral cervical nodes,left axillary node, retroperitoneal/abdominal pelvic adenopathy, bilateral inguinal nodes. Echo 2/14/19:  Normal cavity size, wall thickness and systolic function (ejection fraction normal). The muscle mass is normal. The cavity shape is normal. The estimated ejection fraction is 41 - 45%. Abnormal wall motion as described on the wall scoring diagram below. End-systolic volume is normal. Normal left ventricular strain. There is mild (grade 1) left ventricular diastolic dysfunction. Normal left ventricular diastolic pressure.  End-diastolic volume is normal.    LE arterial duplex 2/22/19:  There is evidence of left groin pseudoaneurysm noted arising from distal common femoral artery, pseudo lobe measures 2.32cm x 2.58cm and pseudo neck length measuring 0.63cm. There is no evidence of hemodynamically significant left lower extremity arterial obstruction. JACLYN is 1.03 on the right and 1.02 on the left. LE arterial duplex s/p Thrombin Injection to Pseudoaneurysm 2/26/19:  Successful thrombin injection procedure of the left groin with no further flow seen. No evidence of hemodnyamically significant obstruction in the left lower extremity. Left lower extremity arterial duplex performed. Confirmed pseudoaneurysm in left groin with small neck. Following thrombin injection, no further flow seen in the pseudoaneurysm. The left common femoral, profunda femoral, femoral, popliteal, posterior tibial and anterior arteries were imaged. Mainly triphasic flow was seen with no evidence of significantly elevated velocities. Repeat LE arterial duplex 2/27/19:  Continued thrombosed left groin pseudoaneurysm following thrombin injection on 02/26/2019. No flow or color fill is identified. The hematoma measures approximately 2.1 x 2.9 cm in diameter. The common femoral, deep femoral, femoral, and popliteal arteries are patent with mainly tri-phasic flow and no significant hemodynamically obstruction is noted.       Assessment & Plan:   Nadege Moreno Sr. is a 41 y.o. male comes in for evaluation and management of lymphoma. 1. Follicular lymphoma: Grade 3a. Although grade 3a disease is considered more indolent and can be treated like grade 1/2 disease, this patient has indications for treatment: bulky disease encircling the aorta causing symptoms. Bone marrow negative for lymphoma, but was hypercellular. BR better than RCHOP, but based on GALLIUM study, Obinutuzumab-based induction and maintenance prolongs PFS over that seen with rituximab-based therapy. Therefore, I have chosen to treat patient with O-CHOP regimen for 6 cycles, followed by possible maintenance Obinutuzumab. We discussed the risks and benefits of O-CHOP chemotherapy. The potential side effects include, but are not limited to: nausea, vomiting, diarrhea, constipation, Flu-like symptoms, infusion reaction, allergic reaction, flushing, taste changes, increased risk of infection, anemia, fatigue, alopecia, neuropathy, nail changes, cardiac damage, mucositis, myleosuppression, infertility and rarely, death. Patient completed chemoteaching and received a chemotherapy education folder. CR after 2 cycles of O-CHOP, but switched regiment Bendamustine-O due to cardiac event. Chemotherapy consent signed and patient educated about side effects including: cytopenias, infections, bleeding, n/v/d, hair loss, skin rash, secondary malignancy, kidney or liver toxicity. He will remain on this regimen every 4 weeks for a total of 4 cycles. -- Milton counts reviewed and are acceptable. -- Return in 1 week for cycle 2, day 1 of Bendamustine-O on 3/28. Neulasta on body on day 2 of each cycle    2. Use of cardiotoxic chemotherapy: Discussed risks/benefits of using doxorubicin. Echo on 12/17/18 showed EF 65%, but drop to EF 45% immediately after MI.  -- Patient will f/u with Dr. Lionel Ellington in [facility]iology at [facility]. -- If patient has not heard from Dr. Lionel Ellington, will refer to Dr. Claudean Freund again. 3. Neoplasm related pain / Anxiety: Left lower back pain. Taking Oxycodone 5mg bid prn. Signed pain contract on 12/28/18. Counseled on adding SSRI if anxiety becomes worse. -- Continue Oxycodone 5mg only twice a day, 80 tabs refilled 1/24/19  -- Ativan 0.5mg bid prn, 60 tabs written 1/24/18  -- Follow up with [facility] on 4/16/19   -- Continues on Lexapro 10 mg daily for anxiety    4. HTN / Hyperlipidemia: Well controlled today on BB, ACEI. 5. Tobacco abuse: Reiterated strong recommendation for smoking cessation in the setting of recent MI. Discussed cessation strategies and pt does not want Wellbutrin given past experience with it. Still smoking a pack per day.  I discussed and offered Chantix, but pt declined. His father smokes too. 6. Dental infection: Caused neutropenic fever and hospital admission recently. No abscess. Blood cultures negative. Treated with IV antibiotics followed by Augmentin. -- Dental evaluation vs OMFS recommended. Zhane Pierre in [address] met with him about affordable dental care options. Unable to afford dental care at this time. 7. H/o STEMI / Cardiac arrest: P/w CP on 2/14/19 followed by collapse and cardiac arrest. Cardiac cath at [facility] by Dr. Eugenia Leon revealed 90-95% occlusion of proximal LAD, TOM placed. Dr. Eugenia Leon [phone]  -- f/u with Cardiology: originally planned for 3/12/19 but has been called to reschedule. -- On dual antiplatelet therapy aspirin and clopidogrel  -- On BB, ACEI, statin      8. H/o Left groin pseudoaneurysm: P/w 10cm region of induration without fluctuance s/p thrombin injection by Dr. Wandy Felton on 2/26/19. Repeat US showed stable thrombus to pseudoaneurysm, patent flow to the common femoral, deep femoral, femoral, and popliteal arteries with no significant obstruction. Swelling has subsided greatly, now only a mild knot present. Emotional well being: Pt is coping well with his/her disease and has excellent support. Met with SW in the past as well as today. I appreciate the opportunity to participate in Mr. Chriss Barksdale 's care.     Signed By: Esteban Stout MD     March 21, 2019

## 2019-03-22 ENCOUNTER — TELEPHONE (OUTPATIENT)
Dept: PALLATIVE CARE | Age: 44
End: 2019-03-22

## 2019-03-22 DIAGNOSIS — C82.33 FOLLICULAR LYMPHOMA GRADE IIIA OF INTRA-ABDOMINAL LYMPH NODES (HCC): ICD-10-CM

## 2019-03-22 DIAGNOSIS — F41.9 ANXIETY: ICD-10-CM

## 2019-03-22 RX ORDER — ALPRAZOLAM 1 MG/1
1 TABLET ORAL
Qty: 90 TAB | Refills: 0 | OUTPATIENT
Start: 2019-03-22 | End: 2019-04-16 | Stop reason: SDUPTHER

## 2019-03-22 NOTE — TELEPHONE ENCOUNTER
Patient is calling requesting a refill on his XANAX. Confirmed pharmacy [id number]. Advised nurse would call to discuss.

## 2019-03-22 NOTE — TELEPHONE ENCOUNTER
Returned call to Mr Gilberto Almodovar. He is requesting refill on the Xanax tab. It was last filled on 3/8/19 for 60 tabs. He should have 6 days left if taking as prescribed. I informed him I will need to send a message to Dr Justine Cameron regarding refill. He verbalized understanding.

## 2019-03-22 NOTE — TELEPHONE ENCOUNTER
Refill called in to the [address] for the Xanax 1 mg tab 90 tabs with no refills. Mr Alyssa Irizarry made aware.

## 2019-03-25 RX ORDER — ONDANSETRON 2 MG/ML
8 INJECTION INTRAMUSCULAR; INTRAVENOUS ONCE
Status: CANCELLED | OUTPATIENT
Start: 2019-03-29

## 2019-03-25 RX ORDER — DIPHENHYDRAMINE HYDROCHLORIDE 50 MG/ML
50 INJECTION, SOLUTION INTRAMUSCULAR; INTRAVENOUS AS NEEDED
Status: CANCELLED
Start: 2019-03-29

## 2019-03-25 RX ORDER — HEPARIN 100 UNIT/ML
300-500 SYRINGE INTRAVENOUS AS NEEDED
Status: CANCELLED
Start: 2019-03-28

## 2019-03-25 RX ORDER — SODIUM CHLORIDE 9 MG/ML
10 INJECTION INTRAMUSCULAR; INTRAVENOUS; SUBCUTANEOUS AS NEEDED
Status: CANCELLED | OUTPATIENT
Start: 2019-03-28

## 2019-03-25 RX ORDER — HYDROCORTISONE SODIUM SUCCINATE 100 MG/2ML
100 INJECTION, POWDER, FOR SOLUTION INTRAMUSCULAR; INTRAVENOUS AS NEEDED
Status: CANCELLED | OUTPATIENT
Start: 2019-03-28

## 2019-03-25 RX ORDER — ACETAMINOPHEN 325 MG/1
650 TABLET ORAL AS NEEDED
Status: CANCELLED
Start: 2019-03-28

## 2019-03-25 RX ORDER — EPINEPHRINE 1 MG/ML
0.3 INJECTION, SOLUTION, CONCENTRATE INTRAVENOUS AS NEEDED
Status: CANCELLED | OUTPATIENT
Start: 2019-03-28

## 2019-03-25 RX ORDER — ONDANSETRON 2 MG/ML
8 INJECTION INTRAMUSCULAR; INTRAVENOUS ONCE
Status: CANCELLED | OUTPATIENT
Start: 2019-03-28

## 2019-03-25 RX ORDER — HYDROCORTISONE SODIUM SUCCINATE 100 MG/2ML
100 INJECTION, POWDER, FOR SOLUTION INTRAMUSCULAR; INTRAVENOUS AS NEEDED
Status: CANCELLED | OUTPATIENT
Start: 2019-03-29

## 2019-03-25 RX ORDER — ALBUTEROL SULFATE 0.83 MG/ML
2.5 SOLUTION RESPIRATORY (INHALATION) AS NEEDED
Status: CANCELLED
Start: 2019-03-28

## 2019-03-25 RX ORDER — ALBUTEROL SULFATE 0.83 MG/ML
2.5 SOLUTION RESPIRATORY (INHALATION) AS NEEDED
Status: CANCELLED
Start: 2019-03-29

## 2019-03-25 RX ORDER — DIPHENHYDRAMINE HYDROCHLORIDE 50 MG/ML
50 INJECTION, SOLUTION INTRAMUSCULAR; INTRAVENOUS ONCE
Status: CANCELLED
Start: 2019-03-28

## 2019-03-25 RX ORDER — SODIUM CHLORIDE 0.9 % (FLUSH) 0.9 %
10 SYRINGE (ML) INJECTION AS NEEDED
Status: CANCELLED
Start: 2019-03-28

## 2019-03-25 RX ORDER — SODIUM CHLORIDE 9 MG/ML
10 INJECTION INTRAMUSCULAR; INTRAVENOUS; SUBCUTANEOUS AS NEEDED
Status: CANCELLED | OUTPATIENT
Start: 2019-03-29

## 2019-03-25 RX ORDER — ACETAMINOPHEN 325 MG/1
650 TABLET ORAL AS NEEDED
Status: CANCELLED
Start: 2019-03-29

## 2019-03-25 RX ORDER — ONDANSETRON 2 MG/ML
8 INJECTION INTRAMUSCULAR; INTRAVENOUS AS NEEDED
Status: CANCELLED | OUTPATIENT
Start: 2019-03-29

## 2019-03-25 RX ORDER — DIPHENHYDRAMINE HYDROCHLORIDE 50 MG/ML
50 INJECTION, SOLUTION INTRAMUSCULAR; INTRAVENOUS AS NEEDED
Status: CANCELLED
Start: 2019-03-28

## 2019-03-25 RX ORDER — ONDANSETRON 2 MG/ML
8 INJECTION INTRAMUSCULAR; INTRAVENOUS AS NEEDED
Status: CANCELLED | OUTPATIENT
Start: 2019-03-28

## 2019-03-25 RX ORDER — SODIUM CHLORIDE 0.9 % (FLUSH) 0.9 %
10 SYRINGE (ML) INJECTION AS NEEDED
Status: CANCELLED
Start: 2019-03-29

## 2019-03-25 RX ORDER — ACETAMINOPHEN 325 MG/1
650 TABLET ORAL ONCE
Status: CANCELLED
Start: 2019-03-28

## 2019-03-25 RX ORDER — EPINEPHRINE 1 MG/ML
0.3 INJECTION, SOLUTION, CONCENTRATE INTRAVENOUS AS NEEDED
Status: CANCELLED | OUTPATIENT
Start: 2019-03-29

## 2019-03-25 RX ORDER — HEPARIN 100 UNIT/ML
300-500 SYRINGE INTRAVENOUS AS NEEDED
Status: CANCELLED
Start: 2019-03-29

## 2019-03-28 ENCOUNTER — OFFICE VISIT (OUTPATIENT)
Dept: ONCOLOGY | Age: 44
End: 2019-03-28

## 2019-03-28 ENCOUNTER — APPOINTMENT (OUTPATIENT)
Dept: INFUSION THERAPY | Age: 44
End: 2019-03-28
Payer: COMMERCIAL

## 2019-03-28 ENCOUNTER — HOSPITAL ENCOUNTER (OUTPATIENT)
Dept: INFUSION THERAPY | Age: 44
Discharge: HOME | End: 2019-03-28
Payer: COMMERCIAL

## 2019-03-28 VITALS
HEIGHT: 67 IN | SYSTOLIC BLOOD PRESSURE: 109 MMHG | DIASTOLIC BLOOD PRESSURE: 65 MMHG | OXYGEN SATURATION: 100 % | WEIGHT: 145.6 LBS | HEART RATE: 65 BPM | BODY MASS INDEX: 22.85 KG/M2 | TEMPERATURE: 98 F | RESPIRATION RATE: 16 BRPM

## 2019-03-28 VITALS
WEIGHT: 145 LBS | TEMPERATURE: 97.5 F | OXYGEN SATURATION: 100 % | RESPIRATION RATE: 16 BRPM | BODY MASS INDEX: 22.76 KG/M2 | DIASTOLIC BLOOD PRESSURE: 85 MMHG | SYSTOLIC BLOOD PRESSURE: 115 MMHG | HEART RATE: 65 BPM | HEIGHT: 67 IN

## 2019-03-28 DIAGNOSIS — I21.4 NSTEMI (NON-ST ELEVATED MYOCARDIAL INFARCTION) (HCC): ICD-10-CM

## 2019-03-28 DIAGNOSIS — I72.9 PSEUDOANEURYSM FOLLOWING PROCEDURE (HCC): ICD-10-CM

## 2019-03-28 DIAGNOSIS — Z51.11 CHEMOTHERAPY MANAGEMENT, ENCOUNTER FOR: ICD-10-CM

## 2019-03-28 DIAGNOSIS — Z72.0 CURRENTLY ATTEMPTING TO QUIT SMOKING: Primary | ICD-10-CM

## 2019-03-28 DIAGNOSIS — C82.33 FOLLICULAR LYMPHOMA GRADE IIIA OF INTRA-ABDOMINAL LYMPH NODES (HCC): Primary | ICD-10-CM

## 2019-03-28 DIAGNOSIS — Z71.6 TOBACCO ABUSE COUNSELING: ICD-10-CM

## 2019-03-28 DIAGNOSIS — C82.90 FOLLICULAR LYMPHOMA, UNSPECIFIED FOLLICULAR LYMPHOMA TYPE, UNSPECIFIED BODY REGION (HCC): Primary | ICD-10-CM

## 2019-03-28 DIAGNOSIS — T81.718A PSEUDOANEURYSM FOLLOWING PROCEDURE (HCC): ICD-10-CM

## 2019-03-28 LAB
ALBUMIN SERPL-MCNC: 3.8 G/DL (ref 3.5–5)
ALBUMIN/GLOB SERPL: 1.4 {RATIO} (ref 1.1–2.2)
ALP SERPL-CCNC: 156 U/L (ref 45–117)
ALT SERPL-CCNC: 46 U/L (ref 12–78)
ANION GAP SERPL CALC-SCNC: 6 MMOL/L (ref 5–15)
AST SERPL-CCNC: 17 U/L (ref 15–37)
BASOPHILS # BLD: 0.1 K/UL (ref 0–0.1)
BASOPHILS NFR BLD: 1 % (ref 0–1)
BILIRUB SERPL-MCNC: 0.4 MG/DL (ref 0.2–1)
BUN SERPL-MCNC: 12 MG/DL (ref 6–20)
BUN/CREAT SERPL: 14 (ref 12–20)
CALCIUM SERPL-MCNC: 9.2 MG/DL (ref 8.5–10.1)
CHLORIDE SERPL-SCNC: 108 MMOL/L (ref 97–108)
CO2 SERPL-SCNC: 25 MMOL/L (ref 21–32)
CREAT SERPL-MCNC: 0.84 MG/DL (ref 0.7–1.3)
DIFFERENTIAL METHOD BLD: ABNORMAL
EOSINOPHIL # BLD: 1.1 K/UL (ref 0–0.4)
EOSINOPHIL NFR BLD: 12 % (ref 0–7)
ERYTHROCYTE [DISTWIDTH] IN BLOOD BY AUTOMATED COUNT: 15.7 % (ref 11.5–14.5)
GLOBULIN SER CALC-MCNC: 2.7 G/DL (ref 2–4)
GLUCOSE SERPL-MCNC: 97 MG/DL (ref 65–100)
HCT VFR BLD AUTO: 40.7 % (ref 36.6–50.3)
HGB BLD-MCNC: 13.5 G/DL (ref 12.1–17)
IMM GRANULOCYTES # BLD AUTO: 0 K/UL (ref 0–0.04)
IMM GRANULOCYTES NFR BLD AUTO: 0 % (ref 0–0.5)
LYMPHOCYTES # BLD: 1.2 K/UL (ref 0.8–3.5)
LYMPHOCYTES NFR BLD: 13 % (ref 12–49)
MCH RBC QN AUTO: 31.4 PG (ref 26–34)
MCHC RBC AUTO-ENTMCNC: 33.2 G/DL (ref 30–36.5)
MCV RBC AUTO: 94.7 FL (ref 80–99)
MONOCYTES # BLD: 0.7 K/UL (ref 0–1)
MONOCYTES NFR BLD: 8 % (ref 5–13)
NEUTS SEG # BLD: 6.2 K/UL (ref 1.8–8)
NEUTS SEG NFR BLD: 66 % (ref 32–75)
NRBC # BLD: 0 K/UL (ref 0–0.01)
NRBC BLD-RTO: 0 PER 100 WBC
PLATELET # BLD AUTO: 123 K/UL (ref 150–400)
PMV BLD AUTO: 10.6 FL (ref 8.9–12.9)
POTASSIUM SERPL-SCNC: 3.7 MMOL/L (ref 3.5–5.1)
PROT SERPL-MCNC: 6.5 G/DL (ref 6.4–8.2)
RBC # BLD AUTO: 4.3 M/UL (ref 4.1–5.7)
RBC MORPH BLD: ABNORMAL
SODIUM SERPL-SCNC: 139 MMOL/L (ref 136–145)
TROPONIN I SERPL-MCNC: <0.05 NG/ML
WBC # BLD AUTO: 9.3 K/UL (ref 4.1–11.1)

## 2019-03-28 PROCEDURE — 85025 COMPLETE CBC W/AUTO DIFF WBC: CPT

## 2019-03-28 PROCEDURE — 74011250636 HC RX REV CODE- 250/636: Performed by: NURSE PRACTITIONER

## 2019-03-28 PROCEDURE — 96413 CHEMO IV INFUSION 1 HR: CPT

## 2019-03-28 PROCEDURE — 74011250637 HC RX REV CODE- 250/637: Performed by: NURSE PRACTITIONER

## 2019-03-28 PROCEDURE — 96375 TX/PRO/DX INJ NEW DRUG ADDON: CPT

## 2019-03-28 PROCEDURE — 80053 COMPREHEN METABOLIC PANEL: CPT

## 2019-03-28 PROCEDURE — 74011000250 HC RX REV CODE- 250: Performed by: NURSE PRACTITIONER

## 2019-03-28 PROCEDURE — 77030012965 HC NDL HUBR BBMI -A

## 2019-03-28 PROCEDURE — 36415 COLL VENOUS BLD VENIPUNCTURE: CPT

## 2019-03-28 PROCEDURE — 96411 CHEMO IV PUSH ADDL DRUG: CPT

## 2019-03-28 PROCEDURE — 96415 CHEMO IV INFUSION ADDL HR: CPT

## 2019-03-28 PROCEDURE — 74011000258 HC RX REV CODE- 258: Performed by: NURSE PRACTITIONER

## 2019-03-28 PROCEDURE — 84484 ASSAY OF TROPONIN QUANT: CPT

## 2019-03-28 RX ORDER — DIPHENHYDRAMINE HYDROCHLORIDE 50 MG/ML
50 INJECTION, SOLUTION INTRAMUSCULAR; INTRAVENOUS ONCE
Status: COMPLETED | OUTPATIENT
Start: 2019-03-28 | End: 2019-03-28

## 2019-03-28 RX ORDER — ONDANSETRON 2 MG/ML
8 INJECTION INTRAMUSCULAR; INTRAVENOUS ONCE
Status: COMPLETED | OUTPATIENT
Start: 2019-03-28 | End: 2019-03-28

## 2019-03-28 RX ORDER — HEPARIN 100 UNIT/ML
300-500 SYRINGE INTRAVENOUS AS NEEDED
Status: ACTIVE | OUTPATIENT
Start: 2019-03-28 | End: 2019-03-28

## 2019-03-28 RX ORDER — SODIUM CHLORIDE 0.9 % (FLUSH) 0.9 %
10 SYRINGE (ML) INJECTION AS NEEDED
Status: ACTIVE | OUTPATIENT
Start: 2019-03-28 | End: 2019-03-28

## 2019-03-28 RX ORDER — VARENICLINE TARTRATE 25 MG
KIT ORAL
Qty: 1 DOSE PACK | Refills: 0 | Status: SHIPPED | OUTPATIENT
Start: 2019-03-28 | End: 2019-08-12 | Stop reason: ALTCHOICE

## 2019-03-28 RX ORDER — SODIUM CHLORIDE 9 MG/ML
10 INJECTION INTRAMUSCULAR; INTRAVENOUS; SUBCUTANEOUS AS NEEDED
Status: ACTIVE | OUTPATIENT
Start: 2019-03-28 | End: 2019-03-28

## 2019-03-28 RX ORDER — ACETAMINOPHEN 325 MG/1
650 TABLET ORAL ONCE
Status: COMPLETED | OUTPATIENT
Start: 2019-03-28 | End: 2019-03-28

## 2019-03-28 RX ADMIN — Medication 10 ML: at 09:28

## 2019-03-28 RX ADMIN — DEXAMETHASONE SODIUM PHOSPHATE 12 MG: 4 INJECTION, SOLUTION INTRA-ARTICULAR; INTRALESIONAL; INTRAMUSCULAR; INTRAVENOUS; SOFT TISSUE at 15:03

## 2019-03-28 RX ADMIN — BENDAMUSTINE HYDROCHLORIDE 162.5 MG: 25 INJECTION, SOLUTION INTRAVENOUS at 15:38

## 2019-03-28 RX ADMIN — ACETAMINOPHEN 650 MG: 325 TABLET ORAL at 10:50

## 2019-03-28 RX ADMIN — OBINUTUZUMAB 1000 MG: 1000 INJECTION, SOLUTION, CONCENTRATE INTRAVENOUS at 11:31

## 2019-03-28 RX ADMIN — Medication 10 ML: at 15:56

## 2019-03-28 RX ADMIN — DIPHENHYDRAMINE HYDROCHLORIDE 50 MG: 50 INJECTION, SOLUTION INTRAMUSCULAR; INTRAVENOUS at 10:50

## 2019-03-28 RX ADMIN — ONDANSETRON 8 MG: 2 INJECTION, SOLUTION INTRAMUSCULAR; INTRAVENOUS at 15:00

## 2019-03-28 RX ADMIN — Medication 500 UNITS: at 15:56

## 2019-03-28 RX ADMIN — SODIUM CHLORIDE 10 ML: 9 INJECTION, SOLUTION INTRAMUSCULAR; INTRAVENOUS; SUBCUTANEOUS at 09:28

## 2019-03-28 RX ADMIN — Medication 10 ML: at 09:27

## 2019-03-28 NOTE — PROGRESS NOTES
[address] [facility] at [address] [place] [address]  [phone]    Hematology / Oncology Established Visit    Reason for Visit:   Imtiaz Galvan is a 41 y.o. male who comes in for f/u of lymphoma. Hematology Oncology Treatment History:     Diagnosis: Follicular lymphoma    Stage: IV    Pathology:   11/13/18 right inguinal LN excision: Follicular lymphoma, high-grade (grade 3a of 3). Comment   The delaney architecture is entirely effaced by atypical lymphocytic proliferation with prominent nodular growth pattern. The majority of the atypical lymphocytes are small to medium in size and have irregular nuclear outlines and inconspicuous nucleoli, morphologically consistent with centrocytes. Scattered large lymphocytes with prominent nucleoli, consistent with centroblasts are identified (> 15 per high-power field). Focal increase in mitotic figures is noted. Occasional follicular dendritic cells are seen. By immunohistochemistry, the atypical lymphocytes are positive for CD20, PAX5, CD10, BCL6 and BCL2 (weak, focal) and negative for MUM1. CD3, CD5 and CD43 stain numerous background T lymphocytes. Ki-67 reveals a high proliferation index in the neoplastic follicles (overall 69-31%). Clinical history indicates 14 cm retroperitoneal mass with bilateral inguinal lymphadenopathy. In summary, the combined morphologic and phenotypic findings are diagnostic of a high-grade follicular lymphoma (grade 3a of 3). There is no evidence of diffuse large B-cell lymphoma. Flow cytometry analysis:   Monoclonal B-cell population (47 % of all cells) with mild increase in side and forward scatter properties expressing CD19, CD20, CD23 and CD10 with surface lambda light chain restriction. No phenotypically aberrant T-cell population. Flow cytometry was performed at [facility]     Prior Treatment: Obinutuzumab-CHOP.  Obinutuzumab: 1000 mg weekly on days 1, 8, 15 for cycle 1, then 1000 mg on day 1 q21 days for cycles 2-6, then monotherapy 1000 mg every 21 days for cycle 7, 8 with Cyclophosphamide 750mg/m2, Doxorubicin 50mg/m2, Vincristine 1.4mg/m2 on day 1 and Prednisone 100mg on Days 1-5, every 21 days for a total of 2 cycles completed late 1/2019. Regimen discontinued due to NSTEMI. Current Treatment: Obinutuzumab + Bendamustine: 1000 mg Obinutuzumab on day 1 + Bendamustine 90mg/m2 on days 1-2 on a 28-day cycle x 4 cycles     History of Present Illness:   Mr. Zapata is a 42 y/o male with HTN who comes in for evaluation of Follicular lymphoma. He states he was in his usual state of health in early November 2018, but then he developed low back pain and presented to the ER. The pain was described as constant, radiating to the buttocks, without exacerbating or alleviating factors, associated w/ increased urination, no n/v/d, no dysuria, no fever, he's unsure about weight loss. Work-up at outside hospital revealed a retroperitoneal mass seen on CT imaging, and he was transferred to [facility] for further work-up. CT there showed a large retroperitoneal mass encircling the aorta with invasion of the left renal hilum and left adrenal gland. There were bilateral inguinal lymph nodes and moderate left hydronephrosis. He was evaluated while at [facility] and was noted to have palpable nodes in his groin for approximately the past 1 month. No testicular mass, anorexia, weight loss, fevers, night sweats, chest pain, shortness of breath, abdominal pain or nausea. He underwent excisional LN biopsy of right inguinal LN. He was told that he had likely lymphoma. He was advised to follow up as outpatient for PET scan, bone marrow biopsy. However, patient states he was lost to f/u. He never received any calls or appointment details. His aunt urged patient to seek care elsewhere and his PCP recommended [facility].  At our first meeting on 12/13/18, he tells me that he was working full time, feeling well until early Nov 2018. When he developed the left lower back pain, he went to [facility] and [facility]. No fevers, chills, night sweats. He has lost 15 lbs in the past month due to low appetite. No n/v/d. No current constipation. No CP, SOB. No h/o cardiac disease aside from HTN, Hyperlipiemia. No hematochezia/melena, hematuria. He has HTN and XOL, which he was taking meds for, but has run out. Has no PCP currently. He is uninsured. He works as a cook, currently working part time. He has children aged 12 and 13. Interval history: Tolerated cycle 1 of chemo regimen (Riddhi-O) and comes in for cycle 2 today. Reports one minute episode of left sided chest pressure prior to getting lab work completed in Health system this morning. Does not report episodes of chest pain at home. No associated SOB, neck/arm pain, nausea. Episode resolved spontaneously. Reports left groin and thigh swelling have decreased and pain has decreased. Eating well, no issues with n/v/d, constipation. Denies recent infections. Denies SOB, dizziness. FAMILY HISTORY:  His father has a history of leukemia, currently in remission, [facility]. LYMPHATICS:  Bilateral palpable inguinal adenopathy.     Past Medical History:   Diagnosis Date    Anxiety     Cancer [facility])     lymphoma Nov 2018 receiving chemo    Chronic pain     lower back- lymphoma    Hyperlipidemia     Hypertension     Lymphadenopathy 11/12/2018      Past Surgical History:   Procedure Laterality Date    HX HEART CATHETERIZATION  02/2019    HX OTHER SURGICAL  02/2019    cardiac stent    IR INJECTION PSEUDOANEURYSM  2/26/2019      Social History     Tobacco Use    Smoking status: Current Every Day Smoker     Packs/day: 1.00     Years: 20.00     Pack years: 20.00    Smokeless tobacco: Never Used   Substance Use Topics    Alcohol use: No     Frequency: Never      Family History   Problem Relation Age of Onset    Hypertension Father     Diabetes Father     Diabetes Mother      Current Outpatient Medications   Medication Sig    ALPRAZolam (XANAX) 1 mg tablet Take 1 Tab by mouth three (3) times daily as needed for Anxiety for up to 30 days. Max Daily Amount: 3 mg.  oxyCODONE IR (ROXICODONE) 5 mg immediate release tablet Take 1 Tab by mouth every four (4) hours as needed for Pain for up to 20 days. Max Daily Amount: 30 mg.    escitalopram oxalate (LEXAPRO) 10 mg tablet Take 1 Tab by mouth daily.  atorvastatin (LIPITOR) 80 mg tablet Take 1 Tab by mouth nightly.  clopidogrel (PLAVIX) 75 mg tab 1 Tab by Per NG tube route daily.  metoprolol succinate (TOPROL-XL) 25 mg XL tablet Take 1 Tab by mouth daily.  lisinopril (PRINIVIL, ZESTRIL) 10 mg tablet Take 1 Tab by mouth daily. DO NOT TAKE for 5 days    L. acidoph & paracasei- S therm- Bifido (AUSTIN-Q/RISAQUAD) 8 billion cell cap cap Take 1 Cap by mouth daily.  magic mouthwash solution Take 15-30 mL by mouth four (4) times daily. Magic mouth wash   Maalox  Lidocaine 2% viscous   Diphenhydramine oral solution    Swish and spit for mouth pain, ok to swallow for throat pain     Pharmacy to mix equal portions of ingredients to a total volume as indicated in the dispense amount.  prochlorperazine (COMPAZINE) 10 mg tablet Take 1 Tab by mouth every six (6) hours as needed.  senna-docusate (PERICOLACE) 8.6-50 mg per tablet Take 1 Tab by mouth daily.  ondansetron hcl (ZOFRAN) 8 mg tablet Take 1 Tab by mouth every eight (8) hours as needed for Nausea. (Patient taking differently: Take  by mouth every eight (8) hours as needed for Nausea.)    naloxone (NARCAN) 4 mg/actuation nasal spray Use 1 spray intranasally, then discard. Repeat with new spray every 2 min as needed for opioid overdose symptoms, alternating nostrils.  aspirin delayed-release (ASPIR-81) 81 mg tablet Take 1 Tab by mouth daily. No current facility-administered medications for this visit.        No Known Allergies     Review of Systems: A complete review of systems was obtained, negative except as described above. Physical Exam:     Visit Vitals  /85   Pulse 65   Temp 97.5 °F (36.4 °C) (Oral)   Resp 16   Ht 5' 7\" (1.702 m)   Wt 145 lb (65.8 kg)   SpO2 100%   BMI 22.71 kg/m²     ECOG PS: 0  General: Well developed, no acute distress  Eyes: PERRLA, EOMI, anicteric sclerae  HENT: Atraumatic, OP clear, poor dentition, multiple dental caries, no erythema,TMs intact without erythema  Neck: Supple  Lymphatic: No supraclavicular, axillary. R cervical 2cm LN absent. Bilateral small 2-3cm palpable inguinal adenopathy  Respiratory: CTAB, normal respiratory effort  CV: Normal rate, regular rhythm, no murmurs, no peripheral edema  GI: Soft, nontender, nondistended, no masses, no hepatomegaly, no splenomegaly  MS: Normal gait and station. Digits without clubbing or cyanosis. Skin: No rashes, ecchymoses, or petechiae. Normal temperature, turgor, and texture. Small nodule present on inner left thigh and along groin, firm to palpation. Neuro/Psych: Alert, oriented. 5/5 strength in all 4 extremities. Appropriate affect, normal judgment/insight. Results:     Lab Results   Component Value Date/Time    WBC 7.5 03/21/2019 10:46 AM    HGB 12.8 03/21/2019 10:46 AM    HCT 39.0 03/21/2019 10:46 AM    PLATELET 266 (L) 90/26/9497 10:46 AM    MCV 97.7 03/21/2019 10:46 AM    ABS.  NEUTROPHILS 4.2 03/21/2019 10:46 AM    Hemoglobin (POC) 15.0 06/05/2009 02:13 PM    Hematocrit (POC) 39 02/14/2019 01:24 PM     Lab Results   Component Value Date/Time    Sodium 139 03/21/2019 10:46 AM    Potassium 3.6 03/21/2019 10:46 AM    Chloride 107 03/21/2019 10:46 AM    CO2 26 03/21/2019 10:46 AM    Glucose 133 (H) 03/21/2019 10:46 AM    BUN 11 03/21/2019 10:46 AM    Creatinine 1.09 03/21/2019 10:46 AM    GFR est AA >60 03/21/2019 10:46 AM    GFR est non-AA >60 03/21/2019 10:46 AM    Calcium 8.9 03/21/2019 10:46 AM    Sodium (POC) 136 02/14/2019 01:24 PM    Potassium (POC) 3.9 02/14/2019 01:24 PM    Chloride (POC) 102 2019 01:24 PM    Glucose (POC) 249 (H) 02/15/2019 10:21 PM    BUN (POC) 14 2019 01:24 PM    Creatinine (POC) 0.9 2019 01:24 PM    Calcium, ionized (POC) 1.24 2019 01:24 PM     Lab Results   Component Value Date/Time    Bilirubin, total 0.3 2019 10:46 AM    ALT (SGPT) 38 2019 10:46 AM    AST (SGOT) 14 (L) 2019 10:46 AM    Alk. phosphatase 139 (H) 2019 10:46 AM    Protein, total 6.6 2019 10:46 AM    Albumin 3.6 2019 10:46 AM    Globulin 3.0 2019 10:46 AM     No results found for: IRON, FE, TIBC, IBCT, PSAT, FERR    No results found for: B12LT, FOL, RBCF  Lab Results   Component Value Date/Time    TSH 1.53 2016 04:40 AM     18:     Lab Results   Component Value Date/Time    Hepatitis A, IgM NONREACTIVE 2018 04:53 PM    Hepatitis B surface Ag <0.10 2018 04:53 PM    Hepatitis B core, IgM NONREACTIVE 2018 04:53 PM         Imagin/9/18 Abd/pelvis CT: IMPRESSION:  1. Interval development of a large retroperitoneal mass encircling the aorta with invasion of the left renal hilum and left adrenal gland. Several adjacent lymph nodes are seen extending into the peritoneum and underneath the  diaphragmatic natalie. This most likely represents lymphoma. 2. Several new bilateral enlarged inguinal lymph nodes also likely representing lymphoma. 3. Moderate left hydronephrosis with a delayed renal nephrogram related to decreased renal function. This is related to the invasion of the renal hilum. 18 Chest CT: IMPRESSION:  Trace left pleural effusion. Bilateral lower lobe atelectasis. Large  retroperitoneal mass lesion again demonstrated. PET 18:  FINDINGS:  HEAD/NECK: Right palatine tonsil intense hypermetabolism, max SUV 18. Multilevel  bilateral cervical adenopathy, with max SUV 12 in a left supraclavicular node. Cerebral evaluation is limited by normal intense activity.   CHEST: Solitary hypermetabolic left axillary node, max SUV 11. ABDOMEN/PELVIS: Bulky retroperitoneal mass max SUV 27, with several additional  small active abdomino-pelvic nodes. Bilateral inguinal nodes with max SUV 12 on  the left. SKELETON: No foci of abnormal hypermetabolism in the axial and visualized  appendicular skeleton. IMPRESSION:   1. Right palatine tonsil tumor involvement (Deauville 5). 2. Bilateral cervical delaney involvement (Deauville 5). 3. Left axillary node involvement (Deauville 5). 4. Bulky retroperitoneal lymphoma mass and additional smaller hypermetabolic  abdomino-pelvic nodes (Deauville 5). 5. Bilateral inguinal delaney involvement (Deauville 5). Deauville Five Point Scale  1. No uptake or no residual uptake (when used interim)  2. Slight uptake, but below blood pool (mediastinum)  3. Uptake above mediastinal, but below/equal to uptake in the liver  4. Uptake slightly to moderately higher than liver  5. Markedly increased uptake or any new lesion (on response evaluation)  Each FDG-avid (or previously FDG avid) lesion is rated independently. Reference values:  Mediastinal blood pool: 2.1 SUV  Liver (background): 2.2 SUV    PET/CT 2/05/19:   IMPRESSION:  1. No Foci of Abnormal Hypermetabolism (Deauville 1). 2. Resolved activity in the right palatine tonsil, bilateral cervical nodes,left axillary node, retroperitoneal/abdominal pelvic adenopathy, bilateral inguinal nodes. Echo 2/14/19:  Normal cavity size, wall thickness and systolic function (ejection fraction normal). The muscle mass is normal. The cavity shape is normal. The estimated ejection fraction is 41 - 45%. Abnormal wall motion as described on the wall scoring diagram below. End-systolic volume is normal. Normal left ventricular strain. There is mild (grade 1) left ventricular diastolic dysfunction. Normal left ventricular diastolic pressure.  End-diastolic volume is normal.    LE arterial duplex 2/22/19:  There is evidence of left groin pseudoaneurysm noted arising from distal common femoral artery, pseudo lobe measures 2.32cm x 2.58cm and pseudo neck length measuring 0.63cm. There is no evidence of hemodynamically significant left lower extremity arterial obstruction. JACLYN is 1.03 on the right and 1.02 on the left. LE arterial duplex s/p Thrombin Injection to Pseudoaneurysm 2/26/19:  Successful thrombin injection procedure of the left groin with no further flow seen. No evidence of hemodnyamically significant obstruction in the left lower extremity. Left lower extremity arterial duplex performed. Confirmed pseudoaneurysm in left groin with small neck. Following thrombin injection, no further flow seen in the pseudoaneurysm. The left common femoral, profunda femoral, femoral, popliteal, posterior tibial and anterior arteries were imaged. Mainly triphasic flow was seen with no evidence of significantly elevated velocities. Repeat LE arterial duplex 2/27/19:  Continued thrombosed left groin pseudoaneurysm following thrombin injection on 02/26/2019. No flow or color fill is identified. The hematoma measures approximately 2.1 x 2.9 cm in diameter. The common femoral, deep femoral, femoral, and popliteal arteries are patent with mainly tri-phasic flow and no significant hemodynamically obstruction is noted.       Assessment & Plan:   Staci Miramontes Sr. is a 41 y.o. male comes in for evaluation and management of lymphoma. 1. Follicular lymphoma: Grade 3a. Although grade 3a disease is considered more indolent and can be treated like grade 1/2 disease, this patient has indications for treatment: bulky disease encircling the aorta causing symptoms. Bone marrow negative for lymphoma, but was hypercellular. BR better than RCHOP, but based on GALLIUM study, Obinutuzumab-based induction and maintenance prolongs PFS over that seen with rituximab-based therapy.  Therefore, I have chosen to treat patient with O-CHOP regimen for 6 cycles, followed by possible maintenance Obinutuzumab. We discussed the risks and benefits of O-CHOP chemotherapy. The potential side effects include, but are not limited to: nausea, vomiting, diarrhea, constipation, Flu-like symptoms, infusion reaction, allergic reaction, flushing, taste changes, increased risk of infection, anemia, fatigue, alopecia, neuropathy, nail changes, cardiac damage, mucositis, myleosuppression, infertility and rarely, death. Patient completed chemoteaching and received a chemotherapy education folder. CR after 2 cycles of O-CHOP, but switched regiment Bendamustine-O due to cardiac event. Chemotherapy consent signed and patient educated about side effects including: cytopenias, infections, bleeding, n/v/d, hair loss, skin rash, secondary malignancy, kidney or liver toxicity. He will remain on this regimen every 4 weeks for a total of 4 cycles. -- Proceed with cycle 2 of BR (cycle 4/6 of chemotherapy overall) with Neulasta OBI on day 2  -- Return in 2 weeks to check on smoking cessation status and cardiology f/u  -- Return in 4 weeks for cycle 3, day 1 Riddhi-O on 4/25  -- Repeat PET after cycle 4     2. Use of cardiotoxic chemotherapy: Discussed risks/benefits of using doxorubicin. Echo on 12/17/18 showed EF 65%, but drop to EF 45% immediately after MI.  -- Patient will f/u with Dr. Kelli Chauhan in Cardiology at [facility]. -- Referred to Dr. Varun jones since patient has not heard from Dr. Kelli Chauhan and is overdue for f/u.    3. Neoplasm related pain / Anxiety: Left lower back pain. Taking Oxycodone 5mg bid prn. Signed pain contract on 12/28/18. Counseled on adding SSRI if anxiety becomes worse. -- Continue Oxycodone 5mg only twice a day, 80 tabs refilled 1/24/19  -- Ativan 0.5mg bid prn, 60 tabs written 1/24/18  -- Follow up with [facility] on 4/16/19   -- Continues on Lexapro 10 mg daily for anxiety    4. HTN / Hyperlipidemia: Well controlled today on BB, ACEI.      5. Tobacco abuse: Reiterated strong recommendation for smoking cessation in the setting of recent MI. Discussed cessation strategies and pt does not want Wellbutrin given past experience with it. Still smoking a pack per day. I discussed and offered Chantix, but pt declined in past - now willing. His father smokes too. -- Cut number of cigarettes smoked by half daily and quit date of next Thursday. -- Start Chantix 0.5mg daily x 3 days, then bid. 6. Dental infection: Caused neutropenic fever and hospital admission recently. No abscess. Blood cultures negative. Treated with IV antibiotics followed by Augmentin. -- Dental evaluation vs OMFS recommended. [facility] in [address] Galvan met with him about affordable dental care options. Unable to afford dental care at this time. 7. H/o STEMI / Cardiac arrest: P/w CP on 2/14/19 followed by collapse and cardiac arrest. Cardiac cath at [facility] by Dr. Emi Espinoza revealed 90-95% occlusion of proximal LAD, TOM placed. Dr. Emi Espinoza [phone]  -- f/u with Cardiology: originally planned for 3/12/19 but has been called to reschedule. Referring to Dr. Ryan Pratt - scheduled for 3/29.  -- On dual antiplatelet therapy aspirin and clopidogrel  -- On BB, ACEI, statin      8. H/o Left groin pseudoaneurysm: P/w 10cm region of induration without fluctuance s/p thrombin injection by Dr. Stuart Lacey on 2/26/19. Repeat US showed stable thrombus to pseudoaneurysm, patent flow to the common femoral, deep femoral, femoral, and popliteal arteries with no significant obstruction. Swelling has subsided greatly, now only a mild knot present. Emotional well being: Pt is coping well with his/her disease and has excellent support. Met with SW in the past as well as today. I appreciate the opportunity to participate in  Ashanti Cobos 's care.     Signed By: Vandana Lambert MD     March 28, 2019

## 2019-03-28 NOTE — PROGRESS NOTES
Isaiah Everett is a 41 y.o. male here today for follow up of lymphoma. States brief episode of chest tightness while in [facility] this morning. Lasted less than one minute per patient. States he has not experienced this in past. States no other symptoms with this. Reports no complaints at this time.

## 2019-03-28 NOTE — PROGRESS NOTES
[facility]patient [facility]fusion [facility] - Chemotherapy Progress Note    5688 Pt admit to [facility] for C2D1 O-Bendamustine ambulatory in stable condition. Assessment completed. Pt reported that he experienced L sided CP lasting approx 1 min while in waiting room. Rated as  2/10 and described as \"pressure\". Denies pain/discomfort/pressure at this time. RN called to report to Lawrence Smith in Dr Sis Naranjo office. PAC with positive blood return. Bio patch in place, as pt requested to maintain access overnight for scheduled [facility] appt tomorrow. Labs obtained and then pt went upstairs to scheduled MD appt. Per MD order, Troponin added to lab work. Per Subha/Cris in office - do not need to wait for results to begin infusion. Chemotherapy Flowsheet 3/28/2019   Cycle C2D1   Date 3/28/2019   Drug / Regimen O-Bendamustine5   Pre Meds -   Notes -       Patient Vitals for the past 12 hrs:   Temp Pulse Resp BP SpO2   03/28/19 1558 98 °F (36.7 °C) 65 16 109/65 --   03/28/19 1456 97.5 °F (36.4 °C) (!) 55 16 113/70 --   03/28/19 1434 97.8 °F (36.6 °C) 62 16 111/77 --   03/28/19 1407 97.7 °F (36.5 °C) 62 16 107/66 --   03/28/19 1331 97.6 °F (36.4 °C) 60 18 108/74 --   03/28/19 1301 97.7 °F (36.5 °C) (!) 56 16 108/67 --   03/28/19 1235 96.5 °F (35.8 °C) (!) 55 16 107/66 --   03/28/19 1204 97.7 °F (36.5 °C) (!) 58 16 108/73 --   03/28/19 1136 97.9 °F (36.6 °C) (!) 58 16 103/70 --   03/28/19 0912 -- 65 18 (!) 122/91 100 %     Lab results reviewed. Medications:  Tylenol PO  Benadryl IVP  Gazyva IV  Decadron IV  Zofran IV  Bendeka IV    1610 Pt tolerated treatment well. PAC maintained positive blood return throughout treatment, flushed with positive blood return at conclusion*. D/c home ambulatory in no distress. Pt aware of next appointment scheduled for 3/29/19 at 0900. Copy of AVS provided to pt.      Recent Results (from the past 12 hour(s))   CBC WITH AUTOMATED DIFF    Collection Time: 03/28/19  9:29 AM   Result Value Ref Range    WBC 9.3 4.1 - 11.1 K/uL    RBC 4.30 4. 10 - 5.70 M/uL    HGB 13.5 12.1 - 17.0 g/dL    HCT 40.7 36.6 - 50.3 %    MCV 94.7 80.0 - 99.0 FL    MCH 31.4 26.0 - 34.0 PG    MCHC 33.2 30.0 - 36.5 g/dL    RDW 15.7 (H) 11.5 - 14.5 %    PLATELET 911 (L) 928 - 400 K/uL    MPV 10.6 8.9 - 12.9 FL    NRBC 0.0 0  WBC    ABSOLUTE NRBC 0.00 0.00 - 0.01 K/uL    NEUTROPHILS 66 32 - 75 %    LYMPHOCYTES 13 12 - 49 %    MONOCYTES 8 5 - 13 %    EOSINOPHILS 12 (H) 0 - 7 %    BASOPHILS 1 0 - 1 %    IMMATURE GRANULOCYTES 0 0.0 - 0.5 %    ABS. NEUTROPHILS 6.2 1.8 - 8.0 K/UL    ABS. LYMPHOCYTES 1.2 0.8 - 3.5 K/UL    ABS. MONOCYTES 0.7 0.0 - 1.0 K/UL    ABS. EOSINOPHILS 1.1 (H) 0.0 - 0.4 K/UL    ABS. BASOPHILS 0.1 0.0 - 0.1 K/UL    ABS. IMM. GRANS. 0.0 0.00 - 0.04 K/UL    DF SMEAR SCANNED      RBC COMMENTS NORMOCYTIC, NORMOCHROMIC     METABOLIC PANEL, COMPREHENSIVE    Collection Time: 03/28/19  9:29 AM   Result Value Ref Range    Sodium 139 136 - 145 mmol/L    Potassium 3.7 3.5 - 5.1 mmol/L    Chloride 108 97 - 108 mmol/L    CO2 25 21 - 32 mmol/L    Anion gap 6 5 - 15 mmol/L    Glucose 97 65 - 100 mg/dL    BUN 12 6 - 20 MG/DL    Creatinine 0.84 0.70 - 1.30 MG/DL    BUN/Creatinine ratio 14 12 - 20      GFR est AA >60 >60 ml/min/1.73m2    GFR est non-AA >60 >60 ml/min/1.73m2    Calcium 9.2 8.5 - 10.1 MG/DL    Bilirubin, total 0.4 0.2 - 1.0 MG/DL    ALT (SGPT) 46 12 - 78 U/L    AST (SGOT) 17 15 - 37 U/L    Alk.  phosphatase 156 (H) 45 - 117 U/L    Protein, total 6.5 6.4 - 8.2 g/dL    Albumin 3.8 3.5 - 5.0 g/dL    Globulin 2.7 2.0 - 4.0 g/dL    A-G Ratio 1.4 1.1 - 2.2     TROPONIN I    Collection Time: 03/28/19  9:29 AM   Result Value Ref Range    Troponin-I, Qt. <0.05 <0.05 ng/mL

## 2019-03-29 ENCOUNTER — OFFICE VISIT (OUTPATIENT)
Dept: CARDIOLOGY CLINIC | Age: 44
End: 2019-03-29

## 2019-03-29 ENCOUNTER — HOSPITAL ENCOUNTER (OUTPATIENT)
Dept: INFUSION THERAPY | Age: 44
Discharge: HOME | End: 2019-03-29
Payer: COMMERCIAL

## 2019-03-29 VITALS
RESPIRATION RATE: 16 BRPM | HEART RATE: 68 BPM | HEIGHT: 67 IN | SYSTOLIC BLOOD PRESSURE: 118 MMHG | OXYGEN SATURATION: 98 % | WEIGHT: 145 LBS | DIASTOLIC BLOOD PRESSURE: 68 MMHG | BODY MASS INDEX: 22.76 KG/M2

## 2019-03-29 VITALS
HEART RATE: 62 BPM | TEMPERATURE: 97.6 F | HEIGHT: 67 IN | RESPIRATION RATE: 18 BRPM | SYSTOLIC BLOOD PRESSURE: 110 MMHG | WEIGHT: 146.8 LBS | DIASTOLIC BLOOD PRESSURE: 75 MMHG | BODY MASS INDEX: 23.04 KG/M2

## 2019-03-29 DIAGNOSIS — E78.5 DYSLIPIDEMIA: ICD-10-CM

## 2019-03-29 DIAGNOSIS — C82.33 FOLLICULAR LYMPHOMA GRADE IIIA OF INTRA-ABDOMINAL LYMPH NODES (HCC): ICD-10-CM

## 2019-03-29 DIAGNOSIS — I25.10 CORONARY ARTERY DISEASE INVOLVING NATIVE CORONARY ARTERY OF NATIVE HEART WITHOUT ANGINA PECTORIS: Primary | ICD-10-CM

## 2019-03-29 DIAGNOSIS — I10 ESSENTIAL HYPERTENSION: ICD-10-CM

## 2019-03-29 DIAGNOSIS — C82.90 FOLLICULAR LYMPHOMA, UNSPECIFIED FOLLICULAR LYMPHOMA TYPE, UNSPECIFIED BODY REGION (HCC): Primary | ICD-10-CM

## 2019-03-29 PROCEDURE — 74011250636 HC RX REV CODE- 250/636: Performed by: NURSE PRACTITIONER

## 2019-03-29 PROCEDURE — 96377 APPLICATON ON-BODY INJECTOR: CPT

## 2019-03-29 PROCEDURE — 74011000258 HC RX REV CODE- 258: Performed by: NURSE PRACTITIONER

## 2019-03-29 PROCEDURE — 96409 CHEMO IV PUSH SNGL DRUG: CPT

## 2019-03-29 PROCEDURE — 96375 TX/PRO/DX INJ NEW DRUG ADDON: CPT

## 2019-03-29 RX ORDER — VARENICLINE TARTRATE 25 MG
0.5 KIT ORAL
COMMUNITY
End: 2019-04-11 | Stop reason: DRUGHIGH

## 2019-03-29 RX ORDER — SODIUM CHLORIDE 0.9 % (FLUSH) 0.9 %
10 SYRINGE (ML) INJECTION AS NEEDED
Status: ACTIVE | OUTPATIENT
Start: 2019-03-29 | End: 2019-03-29

## 2019-03-29 RX ORDER — ONDANSETRON 2 MG/ML
8 INJECTION INTRAMUSCULAR; INTRAVENOUS ONCE
Status: COMPLETED | OUTPATIENT
Start: 2019-03-29 | End: 2019-03-29

## 2019-03-29 RX ORDER — HEPARIN 100 UNIT/ML
300-500 SYRINGE INTRAVENOUS AS NEEDED
Status: ACTIVE | OUTPATIENT
Start: 2019-03-29 | End: 2019-03-29

## 2019-03-29 RX ORDER — SODIUM CHLORIDE 9 MG/ML
10 INJECTION INTRAMUSCULAR; INTRAVENOUS; SUBCUTANEOUS AS NEEDED
Status: ACTIVE | OUTPATIENT
Start: 2019-03-29 | End: 2019-03-29

## 2019-03-29 RX ADMIN — DEXAMETHASONE SODIUM PHOSPHATE 12 MG: 4 INJECTION, SOLUTION INTRAMUSCULAR; INTRAVENOUS at 09:51

## 2019-03-29 RX ADMIN — Medication 10 ML: at 10:55

## 2019-03-29 RX ADMIN — PEGFILGRASTIM 6 MG: KIT SUBCUTANEOUS at 10:51

## 2019-03-29 RX ADMIN — Medication 500 UNITS: at 10:44

## 2019-03-29 RX ADMIN — Medication 10 ML: at 10:44

## 2019-03-29 RX ADMIN — ONDANSETRON 8 MG: 2 INJECTION, SOLUTION INTRAMUSCULAR; INTRAVENOUS at 09:43

## 2019-03-29 RX ADMIN — BENDAMUSTINE HYDROCHLORIDE 162.5 MG: 25 INJECTION, SOLUTION INTRAVENOUS at 10:23

## 2019-03-29 NOTE — PROGRESS NOTES
[facility] - Chemotherapy Progress Note    0900 Pt admit to [facility] for C2D2 O-bendamustine ambulatory in stable condition. Assessment completed. No new concerns voiced. PAC with positive blood return. Labs from 3/28/19 reviewed. OK to procede. Verified with Misty Downing in MD office that no new labs needed today. Chemotherapy Flowsheet 3/29/2019   Cycle C2D2    Date 3/29/2019   Drug / Regimen O-Bendamustine   Pre Meds -   Notes -     Patient Vitals for the past 12 hrs:   Temp Pulse Resp BP   03/29/19 1042 97.6 °F (36.4 °C) 62 18 110/75   03/29/19 0903 97.8 °F (36.6 °C) 70 18 115/73         Medications:  Zofran IVP  Decadron IV  Bendeka IV  Neulasta OBI    Education provided to patient about Neulasta On Body Injector including: side effects, how/when to remove the device, as well as what to do in the event of device malfunction. Opportunity for questions was provided and all questions were answered. Patient verbalized understanding. 1100 Pt tolerated treatment well. PAC maintained positive blood return throughout treatment, flushed with positive blood return at conclusion. D/c home ambulatory in no distress. Pt aware of next appointment scheduled for 4/25/19 at 0900. Lab results from 3/28/19 available in [facility].

## 2019-03-29 NOTE — PROGRESS NOTES
Reason for Consult: CAD, Chemo, Chest pain. Referring: Dr. Cristina Hewitt. HPI: Connor Da Silva is a 41 y.o. male with past medical history significant for hypertension dyslipidemia was diagnosed with lymphoma in November 2018. He was undergoing chemotherapy and was at the [facility] center on February 14, 2019 when he had sudden cardiac death and had AED CPR performed and was transferred to [facility] where a cardiac catheterization performed by Dr. Luna Kumar demonstrated a 95% proximal LAD lesion which he had stented. The distal RCA showed chronic total occlusion. The obtuse marginal did not have any lesions. He has been doing well since revascularization. He is back on his chemo however yesterday when he was awaiting his chemotherapy he had an episode of chest pain which was sharp in nature moderate intensity left anterior nonradiating and lasted for 1 minute. He has not had any recurrence of the chest pain. He has been taking his medications as prescribed. Echocardiogram recently on March 8, 2019 demonstrated normal LVEF without significant regional wall motion abnormality. Plan:    1. Chest pain/ CAD: Symptoms is atypical.  He is fully revascularized in the LAD territory and has a chronic total occlusion of the RCA. At this time does not appear to be anginal chest pain however we will keep a close eye. Continue aspirin and Plavix along with high-dose Lipitor and metoprolol. I will see him back in 1 month. 2.  Hypertension: Blood pressure is controlled. Continue metoprolol. 3.  Dyslipidemia: Continue Lipitor although we will decrease the dose to 40 mg p.o. Daily after 1 month. 4.  Lymphoma: Continue chemotherapy as planned.         Past Medical History:   Diagnosis Date    Anxiety     Cancer [facility])     lymphoma Nov 2018 receiving chemo    Chronic pain     lower back- lymphoma    Hyperlipidemia     Hypertension     Lymphadenopathy 11/12/2018            Past Surgical History: Procedure Laterality Date    HX HEART CATHETERIZATION  02/2019    HX OTHER SURGICAL  02/2019    cardiac stent    IR INJECTION PSEUDOANEURYSM  2/26/2019             Family History   Problem Relation Age of Onset    Hypertension Father     Diabetes Father     Diabetes Mother            Social History     Socioeconomic History    Marital status:      Spouse name: Not on file    Number of children: 2    Years of education: 11    Highest education level: 11th grade   Occupational History     Comment: resturant manager,   Social Needs    Financial resource strain: Not very hard    Food insecurity:     Worry: Never true     Inability: Never true    Transportation needs:     Medical: No     Non-medical: No   Tobacco Use    Smoking status: Current Every Day Smoker     Packs/day: 1.00     Years: 20.00     Pack years: 20.00    Smokeless tobacco: Never Used   Substance and Sexual Activity    Alcohol use: No     Frequency: Never    Drug use: No    Sexual activity: Yes   Lifestyle    Physical activity:     Days per week: 0 days     Minutes per session: 0 min    Stress:  Only a little   Relationships    Social connections:     Talks on phone: Once a week     Gets together: Once a week     Attends [employer] service: Never     Active member of club or organization: No     Attends meetings of clubs or organizations: Never     Relationship status:     Intimate partner violence:     Fear of current or ex partner: No     Emotionally abused: No     Physically abused: No     Forced sexual activity: No   Other Topics Concern     Service No    Blood Transfusions No    Caffeine Concern Yes    Occupational Exposure No    Hobby Hazards No    Sleep Concern No    Stress Concern No    Weight Concern No    Special Diet No    Back Care No    Exercise No    Bike Helmet No    Seat Belt No    Self-Exams No   Social History Narrative    Not on file         No Known Allergies         Current Outpatient Medications   Medication Sig Dispense Refill    varenicline (CHANTIX STARTING MONTH BOX) 0.5 mg (11)- 1 mg (42) DsPk Take 0.5 mg by mouth.  varenicline (CHANTIX STARTER RUDY) 0.5 mg (11)- 1 mg (42) DsPk On Days 1 to 3 take 0.5 mg once daily. Then on Days 4 to 7 take 0.5 mg twice daily. On Day 8 and forward take 1 mg twice daily for 11 weeks. 1 Dose Pack 0    ALPRAZolam (XANAX) 1 mg tablet Take 1 Tab by mouth three (3) times daily as needed for Anxiety for up to 30 days. Max Daily Amount: 3 mg. 90 Tab 0    oxyCODONE IR (ROXICODONE) 5 mg immediate release tablet Take 1 Tab by mouth every four (4) hours as needed for Pain for up to 20 days. Max Daily Amount: 30 mg. 120 Tab 0    escitalopram oxalate (LEXAPRO) 10 mg tablet Take 1 Tab by mouth daily. 30 Tab 3    atorvastatin (LIPITOR) 80 mg tablet Take 1 Tab by mouth nightly. 30 Tab 5    clopidogrel (PLAVIX) 75 mg tab 1 Tab by Per NG tube route daily. 30 Tab 5    metoprolol succinate (TOPROL-XL) 25 mg XL tablet Take 1 Tab by mouth daily. 30 Tab 5    L. acidoph & paracasei- S therm- Bifido (AUSTIN-Q/RISAQUAD) 8 billion cell cap cap Take 1 Cap by mouth daily. 14 Cap 0    aspirin delayed-release (ASPIR-81) 81 mg tablet Take 1 Tab by mouth daily. 30 Tab 1    lisinopril (PRINIVIL, ZESTRIL) 10 mg tablet Take 1 Tab by mouth daily. DO NOT TAKE for 5 days 30 Tab 2    magic mouthwash solution Take 15-30 mL by mouth four (4) times daily. Magic mouth wash   Maalox  Lidocaine 2% viscous   Diphenhydramine oral solution    Swish and spit for mouth pain, ok to swallow for throat pain     Pharmacy to mix equal portions of ingredients to a total volume as indicated in the dispense amount. 480 mL 1    prochlorperazine (COMPAZINE) 10 mg tablet Take 1 Tab by mouth every six (6) hours as needed. 45 Tab 2    senna-docusate (PERICOLACE) 8.6-50 mg per tablet Take 1 Tab by mouth daily.  60 Tab 3    ondansetron hcl (ZOFRAN) 8 mg tablet Take 1 Tab by mouth every eight (8) hours as needed for Nausea. (Patient taking differently: Take  by mouth every eight (8) hours as needed for Nausea.) 30 Tab 2    naloxone (NARCAN) 4 mg/actuation nasal spray Use 1 spray intranasally, then discard. Repeat with new spray every 2 min as needed for opioid overdose symptoms, alternating nostrils. 2 Each 0     Facility-Administered Medications Ordered in Other Visits   Medication Dose Route Frequency Provider Last Rate Last Dose    saline peripheral flush soln 10 mL  10 mL InterCATHeter PRN Carolynn Blake NP   10 mL at 03/29/19 1055    sodium chloride 0.9% injection 10 mL  10 mL IntraVENous PRN Carolynn Blake, NP        heparin (porcine) pf 300-500 Units  300-500 Units InterCATHeter PRN Carolynn Blake NP   500 Units at 03/29/19 1044        ROS:  12 point review of systems was performed.  All negative except for HPI     Physical Exam:  Visit Vitals  /68 (BP 1 Location: Right arm, BP Patient Position: Sitting)   Pulse 68   Resp 16   Ht 5' 7\" (1.702 m)   Wt 145 lb (65.8 kg)   SpO2 98%   BMI 22.71 kg/m²       Gen:  Well-developed, well-nourished, in no acute distress  HEENT:  Pink conjunctivae, PERRL, hearing intact to voice, moist mucous membranes  Neck:  Supple, without masses, thyroid non-tender  Resp:  No accessory muscle use, clear breath sounds without wheezes rales or rhonchi  Card:  No murmurs, normal S1, S2 without thrills, bruits or peripheral edema  Abd:  Soft, non-tender, non-distended, normoactive bowel sounds are present, no palpable organomegaly and no detectable hernias  Lymph:  No cervical or inguinal adenopathy  Musc:  No cyanosis or clubbing  Skin:  No rashes or ulcers, skin turgor is good  Neuro:  Cranial nerves are grossly intact, no focal motor weakness, follows commands appropriately  Psych:  Good insight, oriented to person, place and time, alert     Labs:     Lab Results   Component Value Date/Time    WBC 9.3 03/28/2019 09:29 AM    HGB 13.5 03/28/2019 09:29 AM Hemoglobin (POC) 15.0 06/05/2009 02:13 PM    HCT 40.7 03/28/2019 09:29 AM    Hematocrit (POC) 39 02/14/2019 01:24 PM    PLATELET 991 (L) 37/87/1457 09:29 AM    MCV 94.7 03/28/2019 09:29 AM     Lab Results   Component Value Date/Time    Hemoglobin A1c 5.6 09/28/2016 04:40 AM    Glucose 97 03/28/2019 09:29 AM    Glucose (POC) 249 (H) 02/15/2019 10:21 PM    LDL, calculated 96.4 09/28/2016 04:40 AM    Creatinine (POC) 0.9 02/14/2019 01:24 PM    Creatinine 0.84 03/28/2019 09:29 AM      Lab Results   Component Value Date/Time    Cholesterol, total 170 09/28/2016 04:40 AM    HDL Cholesterol 23 09/28/2016 04:40 AM    LDL, calculated 96.4 09/28/2016 04:40 AM    Triglyceride 253 (H) 09/28/2016 04:40 AM    CHOL/HDL Ratio 7.4 (H) 09/28/2016 04:40 AM     Lab Results   Component Value Date/Time    ALT (SGPT) 46 03/28/2019 09:29 AM    AST (SGOT) 17 03/28/2019 09:29 AM    Alk.  phosphatase 156 (H) 03/28/2019 09:29 AM    Bilirubin, direct 0.1 09/28/2016 04:40 AM    Bilirubin, total 0.4 03/28/2019 09:29 AM    Albumin 3.8 03/28/2019 09:29 AM    Protein, total 6.5 03/28/2019 09:29 AM    INR 1.1 02/22/2019 08:18 PM    Prothrombin time 10.8 02/22/2019 08:18 PM    PLATELET 532 (L) 23/73/4406 09:29 AM    Hepatitis B surface Ag <0.10 12/27/2018 04:53 PM    AFP, Serum, Tumor Marker 4.1 11/10/2018 03:41 AM     Lab Results   Component Value Date/Time    INR 1.1 02/22/2019 08:18 PM    INR 1.0 11/09/2018 09:49 PM    INR 1.0 07/30/2018 07:46 AM    Prothrombin time 10.8 02/22/2019 08:18 PM    Prothrombin time 9.5 11/09/2018 09:49 PM    Prothrombin time 9.4 07/30/2018 07:46 AM      Lab Results   Component Value Date/Time    GFR est non-AA >60 03/28/2019 09:29 AM    GFRNA, POC >60 02/14/2019 01:24 PM    GFR est AA >60 03/28/2019 09:29 AM    GFRAA, POC >60 02/14/2019 01:24 PM    Creatinine 0.84 03/28/2019 09:29 AM    Creatinine (POC) 0.9 02/14/2019 01:24 PM    BUN 12 03/28/2019 09:29 AM    BUN (POC) 14 02/14/2019 01:24 PM    Sodium 139 03/28/2019 09:29 AM    Sodium (POC) 136 02/14/2019 01:24 PM    Potassium 3.7 03/28/2019 09:29 AM    Potassium (POC) 3.9 02/14/2019 01:24 PM    Chloride 108 03/28/2019 09:29 AM    Chloride (POC) 102 02/14/2019 01:24 PM    CO2 25 03/28/2019 09:29 AM    Magnesium 1.7 01/12/2019 04:05 AM    Phosphorus 2.0 (L) 01/12/2019 04:05 AM     No results found for: PSA, Drea Finricarda, [id number], [id number], PSALT  Lab Results   Component Value Date/Time    TSH 1.53 09/28/2016 04:40 AM      Lab Results   Component Value Date/Time    Glucose 97 03/28/2019 09:29 AM    Glucose (POC) 249 (H) 02/15/2019 10:21 PM      Lab Results   Component Value Date/Time    Troponin-I, Qt. <0.05 03/28/2019 09:29 AM      No results found for: BNP, BNPP, BNPPPOC, XBNPT, BNPNT   Lab Results   Component Value Date/Time    Sodium 139 03/28/2019 09:29 AM    Potassium 3.7 03/28/2019 09:29 AM    Chloride 108 03/28/2019 09:29 AM    CO2 25 03/28/2019 09:29 AM    Anion gap 6 03/28/2019 09:29 AM    Glucose 97 03/28/2019 09:29 AM    BUN 12 03/28/2019 09:29 AM    Creatinine 0.84 03/28/2019 09:29 AM    BUN/Creatinine ratio 14 03/28/2019 09:29 AM    GFR est AA >60 03/28/2019 09:29 AM    GFR est non-AA >60 03/28/2019 09:29 AM    Calcium 9.2 03/28/2019 09:29 AM      Lab Results   Component Value Date/Time    Sodium 139 03/28/2019 09:29 AM    Potassium 3.7 03/28/2019 09:29 AM    Chloride 108 03/28/2019 09:29 AM    CO2 25 03/28/2019 09:29 AM    Anion gap 6 03/28/2019 09:29 AM    Glucose 97 03/28/2019 09:29 AM    BUN 12 03/28/2019 09:29 AM    Creatinine 0.84 03/28/2019 09:29 AM    BUN/Creatinine ratio 14 03/28/2019 09:29 AM    GFR est AA >60 03/28/2019 09:29 AM    GFR est non-AA >60 03/28/2019 09:29 AM    Calcium 9.2 03/28/2019 09:29 AM    Bilirubin, total 0.4 03/28/2019 09:29 AM    ALT (SGPT) 46 03/28/2019 09:29 AM    AST (SGOT) 17 03/28/2019 09:29 AM    Alk.  phosphatase 156 (H) 03/28/2019 09:29 AM    Protein, total 6.5 03/28/2019 09:29 AM    Albumin 3.8 03/28/2019 09:29 AM    Globulin 2.7 03/28/2019 09:29 AM    A-G Ratio 1.4 03/28/2019 09:29 AM      Lab Results   Component Value Date/Time    Hemoglobin A1c 5.6 09/28/2016 04:40 AM         Recent Labs     03/28/19  0929   TROIQ <0.05           Problem List:     Problem List  Date Reviewed: 3/29/2019          Codes Class Noted    Pseudoaneurysm ([facility] ICD-10-CM: I72.9  ICD-9-CM: 442.9  2/26/2019        ACS (acute coronary syndrome) ([facility] ICD-10-CM: I24.9  ICD-9-CM: 411.1  2/14/2019        Cardiac arrest [facility]) ICD-10-CM: I46.9  ICD-9-CM: 427.5  2/14/2019        Anxiety ICD-10-CM: F41.9  ICD-9-CM: 300.00  1/9/2019        Pain, dental ICD-10-CM: K08.89  ICD-9-CM: 525.9  1/9/2019        Sepsis ([facility] ICD-10-CM: A41.9  ICD-9-CM: 038.9, 995.91  1/9/2019        Neutropenic fever ([facility] ICD-10-CM: D70.9, R50.81  ICD-9-CM: 288.00, 780.61  9/2/4830        Follicular lymphoma ([facility] ICD-10-CM: C82.90  ICD-9-CM: 202.00  11/16/2018        Lymphadenopathy ICD-10-CM: R59.1  ICD-9-CM: 785.6  11/12/2018        Retroperitoneal mass ICD-10-CM: R19.00  ICD-9-CM: 789.30  11/10/2018        Hyperlipidemia ICD-10-CM: E78.5  ICD-9-CM: 272.4  Unknown        HTN (hypertension) ICD-10-CM: I10  ICD-9-CM: 401.9  9/27/2016        Tobacco abuse ICD-10-CM: Z72.0  ICD-9-CM: 305.1  9/27/2016        Left sided numbness ICD-10-CM: R20.0  ICD-9-CM: 782.0  9/27/2016                Jake South MD, [facility]

## 2019-03-29 NOTE — PROGRESS NOTES
Chief Complaint   Patient presents with    New Patient    Other     NSTEMI with cath/stents    Hypertension     Visit Vitals  /68 (BP 1 Location: Right arm, BP Patient Position: Sitting)   Pulse 68   Resp 16   Ht 5' 7\" (1.702 m)   Wt 145 lb (65.8 kg)   SpO2 98%   BMI 22.71 kg/m²     Chest pain yesterday before chemo, chest pressure for one minute.   SOB denied  Dizziness denied  Swelling denied  Recent hospital visit denied    Referred for chest pain yesterday  Echo done recently, normal findings

## 2019-04-10 ENCOUNTER — TELEPHONE (OUTPATIENT)
Dept: CARDIAC REHAB | Age: 44
End: 2019-04-10

## 2019-04-10 NOTE — TELEPHONE ENCOUNTER
4/10/2019: Cardiac Rehab at [facility]: 2 Aleksandar Cedillo Sr. regarding absence from the Cardiac Rehab. This was the 3rd and final attempt to contact the patient to set up follow up Cardiac Rehab appointments. unable to leave voice mail, Deepika Shin is full. No follow up is planned.     Juan Moss

## 2019-04-10 NOTE — PROGRESS NOTES
[id number] [facility] Oncology [facility]  [phone]    Hematology / Oncology Established Visit    Reason for Visit:   Galdino Chávez is a 41 y.o. male who comes in for f/u of lymphoma. Hematology Oncology Treatment History:     Diagnosis: Follicular lymphoma    Stage: IV    Pathology:   11/13/18 right inguinal LN excision: Follicular lymphoma, high-grade (grade 3a of 3). Comment   The delaney architecture is entirely effaced by atypical lymphocytic proliferation with prominent nodular growth pattern. The majority of the atypical lymphocytes are small to medium in size and have irregular nuclear outlines and inconspicuous nucleoli, morphologically consistent with centrocytes. Scattered large lymphocytes with prominent nucleoli, consistent with centroblasts are identified (> 15 per high-power field). Focal increase in mitotic figures is noted. Occasional follicular dendritic cells are seen. By immunohistochemistry, the atypical lymphocytes are positive for CD20, PAX5, CD10, BCL6 and BCL2 (weak, focal) and negative for MUM1. CD3, CD5 and CD43 stain numerous background T lymphocytes. Ki-67 reveals a high proliferation index in the neoplastic follicles (overall 44-04%). Clinical history indicates 14 cm retroperitoneal mass with bilateral inguinal lymphadenopathy. In summary, the combined morphologic and phenotypic findings are diagnostic of a high-grade follicular lymphoma (grade 3a of 3). There is no evidence of diffuse large B-cell lymphoma. Flow cytometry analysis:   Monoclonal B-cell population (47 % of all cells) with mild increase in side and forward scatter properties expressing CD19, CD20, CD23 and CD10 with surface lambda light chain restriction. No phenotypically aberrant T-cell population. Flow cytometry was performed at [facility]     Prior Treatment: Obinutuzumab-CHOP.  Obinutuzumab: 1000 mg weekly on days 1, 8, 15 for cycle 1, then 1000 mg on day 1 q21 days for cycles 2-6, then monotherapy 1000 mg every 21 days for cycle 7, 8 with Cyclophosphamide 750mg/m2, Doxorubicin 50mg/m2, Vincristine 1.4mg/m2 on day 1 and Prednisone 100mg on Days 1-5, every 21 days for a total of 2 cycles completed late 1/2019. Regimen discontinued due to NSTEMI. Current Treatment: Obinutuzumab + Bendamustine: 1000 mg Obinutuzumab on day 1 + Bendamustine 90mg/m2 on days 1-2 on a 28-day cycle x 4 cycles     History of Present Illness:   Mr. Zapata is a 42 y/o male with HTN who comes in for evaluation of Follicular lymphoma. He states he was in his usual state of health in early November 2018, but then he developed low back pain and presented to the ER. The pain was described as constant, radiating to the buttocks, without exacerbating or alleviating factors, associated w/ increased urination, no n/v/d, no dysuria, no fever, he's unsure about weight loss. Work-up at outside hospital revealed a retroperitoneal mass seen on CT imaging, and he was transferred to [facility] for further work-up. CT there showed a large retroperitoneal mass encircling the aorta with invasion of the left renal hilum and left adrenal gland. There were bilateral inguinal lymph nodes and moderate left hydronephrosis. He was evaluated while at [facility] and was noted to have palpable nodes in his groin for approximately the past 1 month. No testicular mass, anorexia, weight loss, fevers, night sweats, chest pain, shortness of breath, abdominal pain or nausea. He underwent excisional LN biopsy of right inguinal LN. He was told that he had likely lymphoma. He was advised to follow up as outpatient for PET scan, bone marrow biopsy. However, patient states he was lost to f/u. He never received any calls or appointment details. His aunt urged patient to seek care elsewhere and his PCP recommended [facility].  At our first meeting on 12/13/18, he tells me that he was working full time, feeling well until early Nov 2018. When he developed the left lower back pain, he went to [facility]. No fevers, chills, night sweats. He has lost 15 lbs in the past month due to low appetite. No n/v/d. No current constipation. No CP, SOB. No h/o cardiac disease aside from HTN, Hyperlipiemia. No hematochezia/melena, hematuria. He has HTN and XOL, which he was taking meds for, but has run out. Has no PCP currently. He is uninsured. He works as a cook, currently working part time. He has children aged 12 and 13. Interval history: Tolerated cycle 2 of chemo regimen (Riddhi-O) and comes in for discussion of cardiology visit and smoking cessation. Met with Dr. Elicia Barnes on 3/29/19 who recommends continuation of aspirin and Plavix along with high-dose Lipitor and metoprolol and follow up in one month. Recently, patient asked for chantix prescription to help with smoking cessation. He started on the chantix dose pack on 3/30/19 but still smoking 1/2 a pack a day, not cutting down. States he is very stressed with child support, illness and not working. Denies recent chest pain. Reports increased weakness, was unable to throw ball to his son for an extended period. He is walking every day. Eating well, no issues with n/v/d, constipation. Denies recent infections. Denies SOB, dizziness. FAMILY HISTORY:  His father has a history of leukemia, currently in remission, treated at [facility]. LYMPHATICS:  Bilateral palpable inguinal adenopathy.     Past Medical History:   Diagnosis Date    Anxiety     Cancer [facility])     lymphoma Nov 2018 receiving chemo    Chronic pain     lower back- lymphoma    Hyperlipidemia     Hypertension     Lymphadenopathy 11/12/2018      Past Surgical History:   Procedure Laterality Date    HX HEART CATHETERIZATION  02/2019    HX OTHER SURGICAL  02/2019    cardiac stent    IR INJECTION PSEUDOANEURYSM  2/26/2019      Social History     Tobacco Use    Smoking status: Current Every Day Smoker     Packs/day: 1.00     Years: 20.00 Pack years: 20.00    Smokeless tobacco: Never Used   Substance Use Topics    Alcohol use: No     Frequency: Never      Family History   Problem Relation Age of Onset    Hypertension Father     Diabetes Father     Diabetes Mother      Current Outpatient Medications   Medication Sig    varenicline (CHANTIX STARTING MONTH BOX) 0.5 mg (11)- 1 mg (42) DsPk Take 0.5 mg by mouth.  varenicline (CHANTIX STARTER RUDY) 0.5 mg (11)- 1 mg (42) DsPk On Days 1 to 3 take 0.5 mg once daily. Then on Days 4 to 7 take 0.5 mg twice daily. On Day 8 and forward take 1 mg twice daily for 11 weeks.  ALPRAZolam (XANAX) 1 mg tablet Take 1 Tab by mouth three (3) times daily as needed for Anxiety for up to 30 days. Max Daily Amount: 3 mg.  oxyCODONE IR (ROXICODONE) 5 mg immediate release tablet Take 1 Tab by mouth every four (4) hours as needed for Pain for up to 20 days. Max Daily Amount: 30 mg.    escitalopram oxalate (LEXAPRO) 10 mg tablet Take 1 Tab by mouth daily.  atorvastatin (LIPITOR) 80 mg tablet Take 1 Tab by mouth nightly.  clopidogrel (PLAVIX) 75 mg tab 1 Tab by Per NG tube route daily.  metoprolol succinate (TOPROL-XL) 25 mg XL tablet Take 1 Tab by mouth daily.  lisinopril (PRINIVIL, ZESTRIL) 10 mg tablet Take 1 Tab by mouth daily. DO NOT TAKE for 5 days    L. acidoph & paracasei- S therm- Bifido (AUSTIN-Q/RISAQUAD) 8 billion cell cap cap Take 1 Cap by mouth daily.  magic mouthwash solution Take 15-30 mL by mouth four (4) times daily. Magic mouth wash   Maalox  Lidocaine 2% viscous   Diphenhydramine oral solution    Swish and spit for mouth pain, ok to swallow for throat pain     Pharmacy to mix equal portions of ingredients to a total volume as indicated in the dispense amount.  prochlorperazine (COMPAZINE) 10 mg tablet Take 1 Tab by mouth every six (6) hours as needed.  senna-docusate (PERICOLACE) 8.6-50 mg per tablet Take 1 Tab by mouth daily.     ondansetron hcl (ZOFRAN) 8 mg tablet Take 1 Tab by mouth every eight (8) hours as needed for Nausea. (Patient taking differently: Take  by mouth every eight (8) hours as needed for Nausea.)    naloxone (NARCAN) 4 mg/actuation nasal spray Use 1 spray intranasally, then discard. Repeat with new spray every 2 min as needed for opioid overdose symptoms, alternating nostrils.  aspirin delayed-release (ASPIR-81) 81 mg tablet Take 1 Tab by mouth daily. No current facility-administered medications for this visit. No Known Allergies     Review of Systems: A complete review of systems was obtained, negative except as described above. Physical Exam:     Visit Vitals  /85   Pulse 78   Temp 97.7 °F (36.5 °C) (Oral)   Resp 16   Ht 5' 7\" (1.702 m)   Wt 147 lb (66.7 kg)   SpO2 99%   BMI 23.02 kg/m²     ECOG PS: 0  General: Well developed, no acute distress  Eyes: PERRLA, EOMI, anicteric sclerae  HENT: Atraumatic, OP clear, poor dentition, multiple dental caries, no erythema,TMs intact without erythema  Neck: Supple  Lymphatic: No supraclavicular, axillary. R cervical 2cm LN absent. Bilateral small 2-3cm palpable inguinal adenopathy  Respiratory: CTAB, normal respiratory effort  CV: Normal rate, regular rhythm, no murmurs, no peripheral edema  GI: Soft, nontender, nondistended, no masses, no hepatomegaly, no splenomegaly  MS: Normal gait and station. Digits without clubbing or cyanosis. Skin: No rashes, ecchymoses, or petechiae. Normal temperature, turgor, and texture. Small nodule present on inner left thigh and along groin, firm to palpation. Neuro/Psych: Alert, oriented. 5/5 strength in all 4 extremities. Appropriate affect, normal judgment/insight. Results:     Lab Results   Component Value Date/Time    WBC 9.3 03/28/2019 09:29 AM    HGB 13.5 03/28/2019 09:29 AM    HCT 40.7 03/28/2019 09:29 AM    PLATELET 068 (L) 19/16/9658 09:29 AM    MCV 94.7 03/28/2019 09:29 AM    ABS.  NEUTROPHILS 6.2 03/28/2019 09:29 AM    Hemoglobin (POC) 15.0 2009 02:13 PM    Hematocrit (POC) 39 2019 01:24 PM     Lab Results   Component Value Date/Time    Sodium 139 2019 09:29 AM    Potassium 3.7 2019 09:29 AM    Chloride 108 2019 09:29 AM    CO2 25 2019 09:29 AM    Glucose 97 2019 09:29 AM    BUN 12 2019 09:29 AM    Creatinine 0.84 2019 09:29 AM    GFR est AA >60 2019 09:29 AM    GFR est non-AA >60 2019 09:29 AM    Calcium 9.2 2019 09:29 AM    Sodium (POC) 136 2019 01:24 PM    Potassium (POC) 3.9 2019 01:24 PM    Chloride (POC) 102 2019 01:24 PM    Glucose (POC) 249 (H) 02/15/2019 10:21 PM    BUN (POC) 14 2019 01:24 PM    Creatinine (POC) 0.9 2019 01:24 PM    Calcium, ionized (POC) 1.24 2019 01:24 PM     Lab Results   Component Value Date/Time    Bilirubin, total 0.4 2019 09:29 AM    ALT (SGPT) 46 2019 09:29 AM    AST (SGOT) 17 2019 09:29 AM    Alk. phosphatase 156 (H) 2019 09:29 AM    Protein, total 6.5 2019 09:29 AM    Albumin 3.8 2019 09:29 AM    Globulin 2.7 2019 09:29 AM     No results found for: IRON, FE, TIBC, IBCT, PSAT, FERR    No results found for: B12LT, FOL, RBCF  Lab Results   Component Value Date/Time    TSH 1.53 2016 04:40 AM     18:     Lab Results   Component Value Date/Time    Hepatitis A, IgM NONREACTIVE 2018 04:53 PM    Hepatitis B surface Ag <0.10 2018 04:53 PM    Hepatitis B core, IgM NONREACTIVE 2018 04:53 PM         Imagin/9/18 Abd/pelvis CT: IMPRESSION:  1. Interval development of a large retroperitoneal mass encircling the aorta with invasion of the left renal hilum and left adrenal gland. Several adjacent lymph nodes are seen extending into the peritoneum and underneath the  diaphragmatic natalie. This most likely represents lymphoma. 2. Several new bilateral enlarged inguinal lymph nodes also likely representing lymphoma.   3. Moderate left hydronephrosis with a delayed renal nephrogram related to decreased renal function. This is related to the invasion of the renal hilum. 11/11/18 Chest CT: IMPRESSION:  Trace left pleural effusion. Bilateral lower lobe atelectasis. Large  retroperitoneal mass lesion again demonstrated. PET 12/18/18:  FINDINGS:  HEAD/NECK: Right palatine tonsil intense hypermetabolism, max SUV 18. Multilevel  bilateral cervical adenopathy, with max SUV 12 in a left supraclavicular node. Cerebral evaluation is limited by normal intense activity. CHEST: Solitary hypermetabolic left axillary node, max SUV 11. ABDOMEN/PELVIS: Bulky retroperitoneal mass max SUV 27, with several additional  small active abdomino-pelvic nodes. Bilateral inguinal nodes with max SUV 12 on  the left. SKELETON: No foci of abnormal hypermetabolism in the axial and visualized  appendicular skeleton. IMPRESSION:   1. Right palatine tonsil tumor involvement (Deauville 5). 2. Bilateral cervical delaney involvement (Deauville 5). 3. Left axillary node involvement (Deauville 5). 4. Bulky retroperitoneal lymphoma mass and additional smaller hypermetabolic  abdomino-pelvic nodes (Deauville 5). 5. Bilateral inguinal delaney involvement (Deauville 5). Deauville Five Point Scale  1. No uptake or no residual uptake (when used interim)  2. Slight uptake, but below blood pool (mediastinum)  3. Uptake above mediastinal, but below/equal to uptake in the liver  4. Uptake slightly to moderately higher than liver  5. Markedly increased uptake or any new lesion (on response evaluation)  Each FDG-avid (or previously FDG avid) lesion is rated independently. Reference values:  Mediastinal blood pool: 2.1 SUV  Liver (background): 2.2 SUV    PET/CT 2/05/19:   IMPRESSION:  1. No Foci of Abnormal Hypermetabolism (Deauville 1).   2. Resolved activity in the right palatine tonsil, bilateral cervical nodes,left axillary node, retroperitoneal/abdominal pelvic adenopathy, bilateral inguinal nodes. Echo 2/14/19:  Normal cavity size, wall thickness and systolic function (ejection fraction normal). The muscle mass is normal. The cavity shape is normal. The estimated ejection fraction is 41 - 45%. Abnormal wall motion as described on the wall scoring diagram below. End-systolic volume is normal. Normal left ventricular strain. There is mild (grade 1) left ventricular diastolic dysfunction. Normal left ventricular diastolic pressure. End-diastolic volume is normal.    LE arterial duplex 2/22/19:  There is evidence of left groin pseudoaneurysm noted arising from distal common femoral artery, pseudo lobe measures 2.32cm x 2.58cm and pseudo neck length measuring 0.63cm. There is no evidence of hemodynamically significant left lower extremity arterial obstruction. JACLYN is 1.03 on the right and 1.02 on the left. LE arterial duplex s/p Thrombin Injection to Pseudoaneurysm 2/26/19:  Successful thrombin injection procedure of the left groin with no further flow seen. No evidence of hemodnyamically significant obstruction in the left lower extremity. Left lower extremity arterial duplex performed. Confirmed pseudoaneurysm in left groin with small neck. Following thrombin injection, no further flow seen in the pseudoaneurysm. The left common femoral, profunda femoral, femoral, popliteal, posterior tibial and anterior arteries were imaged. Mainly triphasic flow was seen with no evidence of significantly elevated velocities. Repeat LE arterial duplex 2/27/19:  Continued thrombosed left groin pseudoaneurysm following thrombin injection on 02/26/2019. No flow or color fill is identified. The hematoma measures approximately 2.1 x 2.9 cm in diameter.  The common femoral, deep femoral, femoral, and popliteal arteries are patent with mainly tri-phasic flow and no significant hemodynamically obstruction is noted.       Assessment & Plan:   Spero Blood Sr. is a 41 y.o. male comes in for evaluation and management of lymphoma. 1. Follicular lymphoma: Grade 3a. Although grade 3a disease is considered more indolent and can be treated like grade 1/2 disease, this patient has indications for treatment: bulky disease encircling the aorta causing symptoms. Bone marrow negative for lymphoma, but was hypercellular. BR better than RCHOP, but based on GALLIUM study, Obinutuzumab-based induction and maintenance prolongs PFS over that seen with rituximab-based therapy. Therefore, I have chosen to treat patient with O-CHOP regimen for 6 cycles, followed by possible maintenance Obinutuzumab. We discussed the risks and benefits of O-CHOP chemotherapy. The potential side effects include, but are not limited to: nausea, vomiting, diarrhea, constipation, Flu-like symptoms, infusion reaction, allergic reaction, flushing, taste changes, increased risk of infection, anemia, fatigue, alopecia, neuropathy, nail changes, cardiac damage, mucositis, myleosuppression, infertility and rarely, death. Patient completed chemoteaching and received a chemotherapy education folder. CR after 2 cycles of O-CHOP, but switched regiment Bendamustine-O due to cardiac event. Chemotherapy consent signed and patient educated about side effects including: cytopenias, infections, bleeding, n/v/d, hair loss, skin rash, secondary malignancy, kidney or liver toxicity. He will remain on this regimen every 4 weeks for a total of 4 cycles. -- Return in 2 weeks for cycle 3 (cycle 5/6 of chemotherapy overall), day 1 Riddhi-O on 4/25  -- Repeat PET after cycle 4     2. Use of cardiotoxic chemotherapy: Discussed risks/benefits of using doxorubicin. Echo on 12/17/18 showed EF 65%, but drop to EF 45% immediately after MI. Has seen Dr. Alvaro Linda who will follow up again in 1 month. 3. Neoplasm related pain / Anxiety: Left lower back pain. Taking Oxycodone 5mg bid prn. Signed pain contract on 12/28/18.  Counseled on adding SSRI if anxiety becomes worse.   -- Continue Oxycodone 5mg only twice a day, 80 tabs refilled 1/24/19  -- Ativan 0.5mg bid prn, 60 tabs written 1/24/18  -- Follow up with [facility] on 4/16/19   -- Continues on Lexapro 10 mg daily for anxiety    4. HTN / Hyperlipidemia: Well controlled today on BB, ACEI. 5. Tobacco abuse: Reiterated strong recommendation for smoking cessation in the setting of recent MI. Discussed cessation strategies and pt does not want Wellbutrin given past experience with it. Still smoking a pack per day. I discussed and offered Chantix, but pt declined in past - now willing. His father smokes too. -- Reports he has not been able to cut back will continue taking chantix 1 mg BID. 6. Dental infection: Caused neutropenic fever and hospital admission recently. No abscess. Blood cultures negative. Treated with IV antibiotics followed by Augmentin. -- Dental evaluation vs OMFS recommended. [facility] in [address] met with him about affordable dental care options. Unable to afford dental care at this time. 7. H/o STEMI / Cardiac arrest: P/w CP on 2/14/19 followed by collapse and cardiac arrest. Cardiac cath at [facility] by Dr. Floridalma Hinds revealed 90-95% occlusion of proximal LAD, TOM placed. Dr. Floridalma Hinds [phone]. Dr. Aleks Vera recommends: Continue aspirin and Plavix along with high-dose Lipitor and metoprolol.   -- 3/29/19  Met with Dr. Aleks Vera in Cardiology, next follow up 4/29/19  -- Missed cardiac rehab appointments states he did not receive a call. -- On dual antiplatelet therapy aspirin and clopidogrel  -- On BB, ACEI, statin      8. H/o Left groin pseudoaneurysm: P/w 10cm region of induration without fluctuance s/p thrombin injection by Dr. Lobo Claros on 2/26/19. Repeat US showed stable thrombus to pseudoaneurysm, patent flow to the common femoral, deep femoral, femoral, and popliteal arteries with no significant obstruction. Swelling has subsided greatly, now only a mild knot present.      Emotional well being: Pt is coping well with his/her disease and has excellent support. Met with SW in the past as well as today. I appreciate the opportunity to participate in Mr. Dano Steel Sr.'s care.     Signed By: Ana Hoyos NP     April 10, 2019

## 2019-04-11 ENCOUNTER — OFFICE VISIT (OUTPATIENT)
Dept: ONCOLOGY | Age: 44
End: 2019-04-11

## 2019-04-11 VITALS
BODY MASS INDEX: 23.07 KG/M2 | DIASTOLIC BLOOD PRESSURE: 85 MMHG | WEIGHT: 147 LBS | SYSTOLIC BLOOD PRESSURE: 119 MMHG | HEIGHT: 67 IN | RESPIRATION RATE: 16 BRPM | HEART RATE: 78 BPM | TEMPERATURE: 97.7 F | OXYGEN SATURATION: 99 %

## 2019-04-11 DIAGNOSIS — Z71.6 ENCOUNTER FOR SMOKING CESSATION COUNSELING: Primary | ICD-10-CM

## 2019-04-11 DIAGNOSIS — C82.33 FOLLICULAR LYMPHOMA GRADE IIIA OF INTRA-ABDOMINAL LYMPH NODES (HCC): ICD-10-CM

## 2019-04-11 DIAGNOSIS — I21.4 NSTEMI (NON-ST ELEVATED MYOCARDIAL INFARCTION) (HCC): ICD-10-CM

## 2019-04-11 RX ORDER — VARENICLINE TARTRATE 1 MG/1
1 TABLET, FILM COATED ORAL 2 TIMES DAILY
Qty: 60 TAB | Refills: 2 | Status: SHIPPED | OUTPATIENT
Start: 2019-04-11 | End: 2019-08-09 | Stop reason: SDUPTHER

## 2019-04-11 NOTE — CARDIO/PULMONARY
Delkennedi Flora.  41 y.o. With diagnosis of NSTEMI with PCI (LAD) and s/p Cardiac Arrest. attended phase II cardiac rehab for 2 sessions- [facility] on 3/5/19 and first formal exercise (greater than 31 min) on 3/6/19. Pt did not reschedule any further appointments after the 3/6/19 visit. Attempts made to contact patient via telephone the week of 3/18/19 and 3/25/19- boicemail box was full and no message could be left. The 3rd and final attempt was on 4/1019 ([facility] On 4/10/19, voicemail was full and no message could be left. This was the 3rd and final attempt. Connect care message will be sent to Dr. Yin Daley. Pt is discharged from Phase II Cardiac Rehab at [facility].       Darinel Pichardo RN  4/11/2019

## 2019-04-15 ENCOUNTER — TELEPHONE (OUTPATIENT)
Dept: PALLATIVE CARE | Age: 44
End: 2019-04-15

## 2019-04-16 ENCOUNTER — DOCUMENTATION ONLY (OUTPATIENT)
Dept: PALLATIVE CARE | Age: 44
End: 2019-04-16

## 2019-04-16 ENCOUNTER — OFFICE VISIT (OUTPATIENT)
Dept: PALLATIVE CARE | Age: 44
End: 2019-04-16

## 2019-04-16 VITALS
HEART RATE: 73 BPM | RESPIRATION RATE: 16 BRPM | DIASTOLIC BLOOD PRESSURE: 84 MMHG | BODY MASS INDEX: 23.07 KG/M2 | TEMPERATURE: 96.9 F | WEIGHT: 147 LBS | SYSTOLIC BLOOD PRESSURE: 122 MMHG | OXYGEN SATURATION: 100 % | HEIGHT: 67 IN

## 2019-04-16 DIAGNOSIS — R53.83 OTHER FATIGUE: ICD-10-CM

## 2019-04-16 DIAGNOSIS — F41.9 ANXIETY: Primary | ICD-10-CM

## 2019-04-16 DIAGNOSIS — C82.33 FOLLICULAR LYMPHOMA GRADE IIIA OF INTRA-ABDOMINAL LYMPH NODES (HCC): ICD-10-CM

## 2019-04-16 DIAGNOSIS — F17.200 SMOKING: ICD-10-CM

## 2019-04-16 DIAGNOSIS — M54.50 CHRONIC LEFT-SIDED LOW BACK PAIN WITHOUT SCIATICA: ICD-10-CM

## 2019-04-16 DIAGNOSIS — G89.29 CHRONIC LEFT-SIDED LOW BACK PAIN WITHOUT SCIATICA: ICD-10-CM

## 2019-04-16 DIAGNOSIS — R11.0 NAUSEA WITHOUT VOMITING: ICD-10-CM

## 2019-04-16 RX ORDER — OXYCODONE HYDROCHLORIDE 5 MG/1
5 TABLET ORAL
Qty: 120 TAB | Refills: 0 | Status: SHIPPED | OUTPATIENT
Start: 2019-05-10 | End: 2019-05-09 | Stop reason: SDUPTHER

## 2019-04-16 RX ORDER — OXYCODONE HYDROCHLORIDE 5 MG/1
5 TABLET ORAL
Qty: 120 TAB | Refills: 0 | Status: SHIPPED | OUTPATIENT
Start: 2019-04-20 | End: 2019-04-16 | Stop reason: SDUPTHER

## 2019-04-16 RX ORDER — ALPRAZOLAM 1 MG/1
1 TABLET ORAL
Qty: 90 TAB | Refills: 0 | Status: SHIPPED | OUTPATIENT
Start: 2019-04-22 | End: 2019-05-09 | Stop reason: SDUPTHER

## 2019-04-16 NOTE — PROGRESS NOTES
Palliative Medicine Office Visit  Palliative Medicine Nurse Check In  [phone]    Patient Name: Lorel Opitz. YOB: 1975      Date of Office Visit: 4/16/2019      Patient states: Patient here for a follow up no issues. From Check In Sheet (scanned in Media):  Has a medical provider talked with you about cardiopulmonary resuscitation (CPR)? [x] Yes   [] No   [] Unable to obtain    Nurse reminder to complete or update ACP FlowSheet:    Is ACP on the Problem List?    [] Yes    [x] No  IF ACP Document is ON FILE; Nurse to place ACP on Problem List     Is there an ACP Note in Chart Review/Note? [] Yes    [x] No   If NO: [address] Planning 3/28/2019   Patient's Healthcare Decision Maker is: Verbal statement (Legal Next of Kin remains as decision maker)   Confirm Advance Directive None   Patient Would Like to Complete Advance Directive No   Does the patient have other document types -        Primary Decision Maker: Bismark Gonzalez - Father - [phone]    Secondary Decision Maker: Sherrill Mosquera - Other Relative - [phone]  Advance Care Planning 4/16/2019   Patient's [place] is: Verbal statement (Legal Next of Kin remains as decision maker)   Confirm Advance Directive None   Patient Would Like to Complete Advance Directive No   Does the patient have other document types -         Is there anything that we should know about you as a person in order to provide you the best care possible? No    Have you been to the ER, urgent care clinic since your last visit? [] Yes   [x] No   [] Unable to obtain    Have you been hospitalized since your last visit? [] Yes   [x] No   [] Unable to obtain    Have you seen or consulted any other health care providers outside of the [address] [place] [address] since your last visit?    [] Yes   [x] No   [] Unable to obtain    Functional status (describe):       Last BM: last night     accessed (date): 4/15/19    Bottle review (for opioid pain medication):  Medication 1: oxyCODONE IR (ROXICODONE) 5 mg immediate release tablet       Date filled: 3/31/19  Directions: Take 1 Tab by mouth every four (4) hours as needed for Pain for up to 20 days.  Max Daily Amount: 30 mg.              # filled: 120  # left: 50  # pills taking per day:4-5  Last dose taken:last night    Medication 2:   Date filled:   Directions:   # filled:   # left:   # pills taking per day:  Last dose taken:    Medication 3:   Date filled:   Directions:   # filled:   # left:   # pills taking per day:  Last dose taken:    Medication 4:   Date filled:   Directions:   # filled:   # left:   # pills taking per day:  Last dose taken:

## 2019-04-16 NOTE — PATIENT INSTRUCTIONS
Dear Jaja Wilkerson. ,    It was a pleasure seeing you today in [facility]. We will see you again in 3 to 4 weeks. Your stated goal: continue treatment for your lymphoma with control of your anxiety and pain    Your described symptoms were: Fatigue: 5 Drowsiness: 3   Depression: 0 Pain: 7   Anxiety: 2 Nausea: 0   Anorexia: 0 Dyspnea: 0   Best Well-Bein Constipation: No   Other Problem (Comment): 0       This is the plan we talked about:    1. Anxiety  -You have many stressors now, including regarding finances  -You've applied for disability with the assistance of an  through [employer]  -Continue escitalopram (Lexapro) 10-mg daily  -Continue alprazolam 1-mg three times daily as needed. 2. Pain   -Continue oxycodone 5-mg: take 1 to 2 tabs every 4 hours as needed. You are currently taking 5 to 6 pills a day to control your pain.  -We are re-submitting the paperwork to [facility] for coverage of your oxycodone    3. Fatigue  -You're sleeping well at night  -You are most fatigued during the week after chemo    4. Nausea  -You experience nausea several days after your chemo  -Your nausea medicines    5. Bowels  -You're moving your bowels regularly    6. Smoking  -You're now taking Chantix  -Congratulations! You've been able to cut down on the number of cigarettes you smoke  -Keep working at cutting down with your smoking      This is what you have shared with us about Advance Care Planning:      Primary Decision Maker: Laureen Bob - Father - [phone]    Secondary Decision Maker: Sherrill Mosquera - Other Relative - [phone]  [] Named in a scanned document   [x] Legal Next of Kin  [] Guardian    ACP documents you current have include:  [] Advance Directive or Living Will  [] Durable Do Not Resuscitate  [] Physician Orders for Scope of Treatment (POST)  [] Medical Power of   [] Other      The Palliative Medicine Team is here to support you and your family. Sincerely,      Tata Narayanan MD and the Palliative Medicine Team

## 2019-04-16 NOTE — PROGRESS NOTES
Palliative Medicine Outpatient Services  [place]: [phone] ([phone]    Patient Name: Jason Carnes. YOB: 1975    Date of Current Visit: 04/16/19  Location of Current Visit:    [] [facility] Office  [x] [facility] Office  [] [address] [place] [address]  [] Home  [] Other:      Date of Initial Visit: 2/19/19   Referral from: Jeremie Lin MD  Primary Care Physician: None      SUMMARY:   Jason Johnson is a 41y.o. year old with high-grade follicular lymphoma, who was referred to Palliative Medicine by Dr. Ely Rebolledo for symptom management and supportive care. He was diagnosed in 11/2018 after presenting with back pain. He is currently receiving systemic chemotherapy. He suffered cardiac arrest during cycle #3 due to previously undiagnosed severe stenosis of the LAD s/p PTCA and stent. He has since resumed chemotherapy. The patients social history includes: he is single. He lives with his father in [place]. He has 3 teenage children who live with their mother and with whom he has close contact. 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 He's applied for disability. Palliative Medicine is addressing the following current patient/family concerns: anxiety, depression, left mid- and low back pain related to malignancy, poor appetite, fatigue, advanced care planning. Initial Referral Intake note from Marcus Cuellar RN reviewed prior to visit   PALLIATIVE DIAGNOSES:       ICD-10-CM ICD-9-CM    1. Anxiety F41.9 300.00 ALPRAZolam (XANAX) 1 mg tablet   2. Chronic left-sided low back pain without sciatica M54.5 724.2     G89.29 338.29    3. Other fatigue R53.83 780.79    4. Nausea without vomiting R11.0 787.02    5. Smoking F17.200 305.1    6.  Follicular lymphoma grade IIIa of intra-abdominal lymph nodes (HCC) C82.33 202.03 ALPRAZolam (XANAX) 1 mg tablet      oxyCODONE IR (ROXICODONE) 5 mg immediate release tablet      DISCONTINUED: oxyCODONE IR (ROXICODONE) 5 mg immediate release tablet          PLAN:   Patient Instructions Dear Dirk Serrano. ,    It was a pleasure seeing you today in [facility]. We will see you again in 3 to 4 weeks. Your stated goal: continue treatment for your lymphoma with control of your anxiety and pain    Your described symptoms were: Fatigue: 5 Drowsiness: 3   Depression: 0 Pain: 7   Anxiety: 2 Nausea: 0   Anorexia: 0 Dyspnea: 0   Best Well-Bein Constipation: No   Other Problem (Comment): 0       This is the plan we talked about:    1. Anxiety  -You have many stressors now, including regarding finances  -You've applied for disability with the assistance of an  through [employer]  -Continue escitalopram (Lexapro) 10-mg daily  -Continue alprazolam 1-mg twice daily as needed. 2. Pain   -Continue oxycodone 5-mg: take 1 to 2 tabs every 4 hours as needed. You are currently taking 5 to 6 pills a day to control your pain.  -We are re-submitting the paperwork to [facility] for coverage of your oxycodone    3. Fatigue  -You're sleeping well at night  -You are most fatigued during the week after chemo    4. Nausea  -You experience nausea several days after your chemo  -Your nausea medicines    5. Bowels  -You're moving your bowels regularly    6. Smoking  -You're now taking Chantix  -Congratulations! You've been able to cut down on the number of cigarettes you smoke  -Keep working at cutting down with your smoking      This is what you have shared with us about Advance Care Planning:      Primary Decision Maker: Karrie Doherty - Father - [phone]    Secondary Decision Maker: Sherrill Mosquera - Other Relative - [phone]  [] Named in a scanned document   [x] Legal Next of Kin  [] Guardian    ACP documents you current have include:  [] Advance Directive or Living Will  [] Durable Do Not Resuscitate  [] Physician Orders for Scope of Treatment (POST)  [] Medical Power of   [] Other      The Palliative Medicine Team is here to support you and your family. Sincerely,      Savage Armenta MD and the Palliative Medicine Team      Counseling and Coordination: note routed to care team       GOALS OF CARE / TREATMENT PREFERENCES:   [====Goals of Care====]  GOALS OF CARE:  Patient / health care proxy stated goals: See Patient Instructions / Summary    TREATMENT PREFERENCES:   Code Status:  [x] Attempt Resuscitation       [] Do Not Attempt Resuscitation    Advance Care Planning:  [x] The Pall Med Interdisciplinary Team has updated the ACP Navigator with Decision Maker and Patient Capacity      Primary Decision Maker: Sepideh Garnica - Father - [phone]    Secondary Decision Maker: Sherrill Mosquera - Other Relative - [phone]  [] Named in a scanned document   [x] Legal Next of Kin  [] Guardian    Other:  (If patient appropriate for POST, consider using PALLPOST smart phrase here)    The palliative care team has discussed with patient / health care proxy about goals of care / treatment preferences for patient.  [====Goals of Care====]     PRESCRIPTIONS GIVEN:     Medications Ordered Today   Medications    ALPRAZolam (XANAX) 1 mg tablet     Sig: Take 1 Tab by mouth three (3) times daily as needed for Anxiety for up to 30 days. Max Daily Amount: 3 mg. Dispense:  90 Tab     Refill:  0    DISCONTD: oxyCODONE IR (ROXICODONE) 5 mg immediate release tablet     Sig: Take 1 Tab by mouth every four (4) hours as needed for Pain for up to 20 days. Max Daily Amount: 30 mg. Dispense:  120 Tab     Refill:  0    oxyCODONE IR (ROXICODONE) 5 mg immediate release tablet     Sig: Take 1 Tab by mouth every four (4) hours as needed for Pain for up to 20 days. Max Daily Amount: 30 mg.      Dispense:  120 Tab     Refill:  0           FOLLOW UP:     Future Appointments   Date Time Provider Dahlia Epps   4/25/2019  9:00 AM SS INF3 CH2 >4H [facility]   4/25/2019  9:45 AM Lora Blake NP [facility]   4/26/2019  9:00 AM SS INF3 CH2 >4H [facility]   4/29/2019  2:20 PM Corrie Amador MD [facility]   5/23/2019  9:00 AM SS INF3 CH1 >4H [facility]   5/24/2019  9:00 AM SS INF4 CH3 <4H [facility]           PHYSICIANS INVOLVED IN CARE:   Patient Care Team:  None as PCP - Patricia Yepez MD (Hematology and Oncology)  Mikel Ledbetter MD as Physician (Palliative Medicine)  Mikel Ledbetter MD as Physician (Palliative Medicine)       HISTORY:   Reviewed patient-completed ESAS and advance care planning form. Reviewed patient record in prescription monitoring program.    CHIEF COMPLAINT:   Chief Complaint   Patient presents with    Anxiety       HPI/SUBJECTIVE:    The patient is: [x] Verbal / [] Nonverbal     He's feeling less anxious now that's he back to taking Xanax three times a day. He has a lot of stress, mainly about money. He has no income. He met with an  through [facility] and filed the paperwork for disability. He expects to hear something in 1 to 3 months. He feels his pain is OK. Oxycodone takes his pain down to about a 2. He takes his pain medicine every 4 hours. He continues to have a co-pay of $24.00 for his oxycodone (no co-pay for his other medicines). He's going to cardiac rehab. Most of the time he watches TV, fools around the house. He's trying to stop smoking. Dr. Louie Cam prescribed Chantix. He's been able to cut down a little on his smoking since then. He hasn't had any chest pain or shortness of breath. He's eating like a horse. He feels a little nauseous a few days after chemo, sometimes enough to use a nausea pill, sometimes not. He's moving his bowels without taking stool softeners or laxatives. He feels weak and tired for a few days, up to a week, after chemo.       Clinical Pain Assessment (nonverbal scale for nonverbal patients):   [++++ Clinical Pain Assessment++++]  [++++Pain Severity++++]: Pain: 7  [++++Pain Character++++]: stabbing pain in back, groin feels like a pulled muscle  [++++Pain Duration++++]: months for back pain, weeks for groin pain  [++++Pain Effect++++]: little  [++++Pain Factors++++]: oxycodone helps with back pain, groin pain elicited by standing and walking  [++++Pain Frequency++++]: constant back pain with varying intensity, intermittent groin pain  [++++Pain Location++++]: left lower back, left groin and leg  [++++ Clinical Pain Assessment++++]       FUNCTIONAL ASSESSMENT:     Palliative Performance Scale (PPS):  PPS: 80       PSYCHOSOCIAL/SPIRITUAL SCREENING:     Any spiritual / Episcopal concerns:  [] Yes /  [x] No    Caregiver Burnout:  [] Yes /  [x] No /  [] No Caregiver Present      Anticipatory grief assessment:   [x] Normal  / [] Maladaptive       ESAS Anxiety: Anxiety: 2    ESAS Depression: Depression: 0       REVIEW OF SYSTEMS:     The following systems were [x] reviewed / [] unable to be reviewed  Systems: constitutional, ears/nose/mouth/throat, respiratory, gastrointestinal, genitourinary, musculoskeletal, integumentary, neurologic, psychiatric, endocrine. Positive findings noted below. Modified ESAS Completed by: provider   Fatigue: 5 Drowsiness: 3   Depression: 0 Pain: 7   Anxiety: 2 Nausea: 0   Anorexia: 0 Dyspnea: 0   Best Well-Bein Constipation: No   Other Problem (Comment): 0          PHYSICAL EXAM:     Wt Readings from Last 3 Encounters:   19 147 lb (66.7 kg)   19 147 lb (66.7 kg)   19 146 lb 12.8 oz (66.6 kg)     Blood pressure 122/84, pulse 73, temperature 96.9 °F (36.1 °C), temperature source Oral, resp. rate 16, height 5' 7\" (1.702 m), weight 147 lb (66.7 kg), SpO2 100 %.   Last bowel movement: See Nursing Note    Constitutional: sitting in chair, appears rested  Eyes: pupils equal, anicteric  ENMT: no nasal discharge, moist mucous membranes  Cardiovascular: regular rhythm, no peripheral edema  Respiratory: breathing not labored, symmetric  Gastrointestinal: soft non-tender, +bowel sounds  Musculoskeletal: no deformity, no tenderness to palpation  Skin: warm, dry  Neurologic: following commands, moving all extremities  Psychiatric: full affect, no hallucinations  Other:       HISTORY:     Past Medical History:   Diagnosis Date    Anxiety     Cancer [facility])     lymphoma Nov 2018 receiving chemo    Chronic pain     lower back- lymphoma    Hyperlipidemia     Hypertension     Lymphadenopathy 11/12/2018      Past Surgical History:   Procedure Laterality Date    HX HEART CATHETERIZATION  02/2019    HX OTHER SURGICAL  02/2019    cardiac stent    IR INJECTION PSEUDOANEURYSM  2/26/2019      Family History   Problem Relation Age of Onset    Hypertension Father     Diabetes Father     Diabetes Mother       History reviewed, no pertinent family history. Social History     Tobacco Use    Smoking status: Current Every Day Smoker     Packs/day: 1.00     Years: 20.00     Pack years: 20.00    Smokeless tobacco: Never Used   Substance Use Topics    Alcohol use: No     Frequency: Never     No Known Allergies   Current Outpatient Medications   Medication Sig    [START ON 4/22/2019] ALPRAZolam (XANAX) 1 mg tablet Take 1 Tab by mouth three (3) times daily as needed for Anxiety for up to 30 days. Max Daily Amount: 3 mg.    [START ON 5/10/2019] oxyCODONE IR (ROXICODONE) 5 mg immediate release tablet Take 1 Tab by mouth every four (4) hours as needed for Pain for up to 20 days. Max Daily Amount: 30 mg.    varenicline (CHANTIX) 1 mg tablet Take 1 Tab by mouth two (2) times a day for 90 days.  varenicline (CHANTIX STARTER RUDY) 0.5 mg (11)- 1 mg (42) DsPk On Days 1 to 3 take 0.5 mg once daily. Then on Days 4 to 7 take 0.5 mg twice daily. On Day 8 and forward take 1 mg twice daily for 11 weeks.  escitalopram oxalate (LEXAPRO) 10 mg tablet Take 1 Tab by mouth daily.  atorvastatin (LIPITOR) 80 mg tablet Take 1 Tab by mouth nightly.  clopidogrel (PLAVIX) 75 mg tab 1 Tab by Per NG tube route daily.     metoprolol succinate (TOPROL-XL) 25 mg XL tablet Take 1 Tab by mouth daily.  lisinopril (PRINIVIL, ZESTRIL) 10 mg tablet Take 1 Tab by mouth daily. DO NOT TAKE for 5 days    L. acidoph & paracasei- S therm- Bifido (AUSTIN-Q/RISAQUAD) 8 billion cell cap cap Take 1 Cap by mouth daily.  aspirin delayed-release (ASPIR-81) 81 mg tablet Take 1 Tab by mouth daily.  magic mouthwash solution Take 15-30 mL by mouth four (4) times daily. Magic mouth wash   Maalox  Lidocaine 2% viscous   Diphenhydramine oral solution    Swish and spit for mouth pain, ok to swallow for throat pain     Pharmacy to mix equal portions of ingredients to a total volume as indicated in the dispense amount.  prochlorperazine (COMPAZINE) 10 mg tablet Take 1 Tab by mouth every six (6) hours as needed.  senna-docusate (PERICOLACE) 8.6-50 mg per tablet Take 1 Tab by mouth daily.  ondansetron hcl (ZOFRAN) 8 mg tablet Take 1 Tab by mouth every eight (8) hours as needed for Nausea. (Patient taking differently: Take  by mouth every eight (8) hours as needed for Nausea.)    naloxone (NARCAN) 4 mg/actuation nasal spray Use 1 spray intranasally, then discard. Repeat with new spray every 2 min as needed for opioid overdose symptoms, alternating nostrils. No current facility-administered medications for this visit. LAB DATA REVIEWED:     Lab Results   Component Value Date/Time    WBC 9.3 03/28/2019 09:29 AM    HGB 13.5 03/28/2019 09:29 AM    PLATELET 465 (L) 16/30/4798 09:29 AM     Lab Results   Component Value Date/Time    Sodium 139 03/28/2019 09:29 AM    Potassium 3.7 03/28/2019 09:29 AM    Chloride 108 03/28/2019 09:29 AM    CO2 25 03/28/2019 09:29 AM    BUN 12 03/28/2019 09:29 AM    Creatinine 0.84 03/28/2019 09:29 AM    Calcium 9.2 03/28/2019 09:29 AM    Magnesium 1.7 01/12/2019 04:05 AM    Phosphorus 2.0 (L) 01/12/2019 04:05 AM      Lab Results   Component Value Date/Time    AST (SGOT) 17 03/28/2019 09:29 AM    Alk.  phosphatase 156 (H) 03/28/2019 09:29 AM    Protein, total 6.5 03/28/2019 09:29 AM    Albumin 3.8 03/28/2019 09:29 AM    Globulin 2.7 03/28/2019 09:29 AM     Lab Results   Component Value Date/Time    INR 1.1 02/22/2019 08:18 PM    Prothrombin time 10.8 02/22/2019 08:18 PM    aPTT 27.8 02/22/2019 08:18 PM      No results found for: IRON, FE, TIBC, IBCT, PSAT, FERR        CONTROLLED SUBSTANCES SAFETY ASSESSMENT (IF ON CONTROLLED SUBSTANCES):     Reviewed opioid safety handout:  [x] Yes   [] No  24 hour opioid dose >150mg morphine equivalent/day:  [] Yes   [x] No  Benzodiazepines:  [x] Yes   [] No  Sleep apnea:  [] Yes   [x] No  Urine Toxicology Testing within last 6 months:  [] Yes   [] No  History of or new aberrant medication taking behaviors:  [] Yes   [] No  Has Narcan been prescribed [] Yes   [x] No          Total time:   Counseling / coordination time:   > 50% counseling / coordination?:

## 2019-04-16 NOTE — PROGRESS NOTES
PA was expedited for the Oxycodone IR 5 mg tab. Faxed request to [facility] [phone]. The Patient is aware PA is in progress.

## 2019-04-17 ENCOUNTER — DOCUMENTATION ONLY (OUTPATIENT)
Dept: PALLATIVE CARE | Age: 44
End: 2019-04-17

## 2019-04-17 NOTE — PROGRESS NOTES
PA was approved for the Oxycodone 5 mg tab through 10/15/9. Call made to the patient unable to leave V/M mailbox was full.

## 2019-04-22 RX ORDER — ONDANSETRON 2 MG/ML
8 INJECTION INTRAMUSCULAR; INTRAVENOUS AS NEEDED
Status: CANCELLED | OUTPATIENT
Start: 2019-04-26

## 2019-04-22 RX ORDER — HYDROCORTISONE SODIUM SUCCINATE 100 MG/2ML
100 INJECTION, POWDER, FOR SOLUTION INTRAMUSCULAR; INTRAVENOUS AS NEEDED
Status: CANCELLED | OUTPATIENT
Start: 2019-04-25

## 2019-04-22 RX ORDER — ONDANSETRON 2 MG/ML
8 INJECTION INTRAMUSCULAR; INTRAVENOUS ONCE
Status: CANCELLED | OUTPATIENT
Start: 2019-04-26

## 2019-04-22 RX ORDER — ONDANSETRON 2 MG/ML
8 INJECTION INTRAMUSCULAR; INTRAVENOUS ONCE
Status: CANCELLED | OUTPATIENT
Start: 2019-04-25

## 2019-04-22 RX ORDER — DIPHENHYDRAMINE HYDROCHLORIDE 50 MG/ML
50 INJECTION, SOLUTION INTRAMUSCULAR; INTRAVENOUS AS NEEDED
Status: CANCELLED
Start: 2019-04-25

## 2019-04-22 RX ORDER — SODIUM CHLORIDE 0.9 % (FLUSH) 0.9 %
10 SYRINGE (ML) INJECTION AS NEEDED
Status: CANCELLED
Start: 2019-04-25

## 2019-04-22 RX ORDER — DIPHENHYDRAMINE HYDROCHLORIDE 50 MG/ML
50 INJECTION, SOLUTION INTRAMUSCULAR; INTRAVENOUS AS NEEDED
Status: CANCELLED
Start: 2019-04-26

## 2019-04-22 RX ORDER — HYDROCORTISONE SODIUM SUCCINATE 100 MG/2ML
100 INJECTION, POWDER, FOR SOLUTION INTRAMUSCULAR; INTRAVENOUS AS NEEDED
Status: CANCELLED | OUTPATIENT
Start: 2019-04-26

## 2019-04-22 RX ORDER — HEPARIN 100 UNIT/ML
300-500 SYRINGE INTRAVENOUS AS NEEDED
Status: CANCELLED
Start: 2019-04-26

## 2019-04-22 RX ORDER — ALBUTEROL SULFATE 0.83 MG/ML
2.5 SOLUTION RESPIRATORY (INHALATION) AS NEEDED
Status: CANCELLED
Start: 2019-04-25

## 2019-04-22 RX ORDER — EPINEPHRINE 1 MG/ML
0.3 INJECTION, SOLUTION, CONCENTRATE INTRAVENOUS AS NEEDED
Status: CANCELLED | OUTPATIENT
Start: 2019-04-26

## 2019-04-22 RX ORDER — ALBUTEROL SULFATE 0.83 MG/ML
2.5 SOLUTION RESPIRATORY (INHALATION) AS NEEDED
Status: CANCELLED
Start: 2019-04-26

## 2019-04-22 RX ORDER — EPINEPHRINE 1 MG/ML
0.3 INJECTION, SOLUTION, CONCENTRATE INTRAVENOUS AS NEEDED
Status: CANCELLED | OUTPATIENT
Start: 2019-04-25

## 2019-04-22 RX ORDER — ONDANSETRON 2 MG/ML
8 INJECTION INTRAMUSCULAR; INTRAVENOUS AS NEEDED
Status: CANCELLED | OUTPATIENT
Start: 2019-04-25

## 2019-04-22 RX ORDER — HEPARIN 100 UNIT/ML
300-500 SYRINGE INTRAVENOUS AS NEEDED
Status: CANCELLED
Start: 2019-04-25

## 2019-04-22 RX ORDER — SODIUM CHLORIDE 9 MG/ML
10 INJECTION INTRAMUSCULAR; INTRAVENOUS; SUBCUTANEOUS AS NEEDED
Status: CANCELLED | OUTPATIENT
Start: 2019-04-26

## 2019-04-22 RX ORDER — SODIUM CHLORIDE 0.9 % (FLUSH) 0.9 %
10 SYRINGE (ML) INJECTION AS NEEDED
Status: CANCELLED
Start: 2019-04-26

## 2019-04-22 RX ORDER — SODIUM CHLORIDE 9 MG/ML
10 INJECTION INTRAMUSCULAR; INTRAVENOUS; SUBCUTANEOUS AS NEEDED
Status: CANCELLED | OUTPATIENT
Start: 2019-04-25

## 2019-04-22 RX ORDER — ACETAMINOPHEN 325 MG/1
650 TABLET ORAL ONCE
Status: CANCELLED
Start: 2019-04-25

## 2019-04-22 RX ORDER — ACETAMINOPHEN 325 MG/1
650 TABLET ORAL AS NEEDED
Status: CANCELLED
Start: 2019-04-26

## 2019-04-22 RX ORDER — DIPHENHYDRAMINE HYDROCHLORIDE 50 MG/ML
50 INJECTION, SOLUTION INTRAMUSCULAR; INTRAVENOUS ONCE
Status: CANCELLED
Start: 2019-04-25

## 2019-04-22 RX ORDER — ACETAMINOPHEN 325 MG/1
650 TABLET ORAL AS NEEDED
Status: CANCELLED
Start: 2019-04-25

## 2019-04-24 RX ORDER — ACETAMINOPHEN 325 MG/1
650 TABLET ORAL AS NEEDED
Status: CANCELLED
Start: 2019-05-24

## 2019-04-24 RX ORDER — HEPARIN 100 UNIT/ML
300-500 SYRINGE INTRAVENOUS AS NEEDED
Status: CANCELLED
Start: 2019-05-23

## 2019-04-24 RX ORDER — ALBUTEROL SULFATE 0.83 MG/ML
2.5 SOLUTION RESPIRATORY (INHALATION) AS NEEDED
Status: CANCELLED
Start: 2019-05-23

## 2019-04-24 RX ORDER — HYDROCORTISONE SODIUM SUCCINATE 100 MG/2ML
100 INJECTION, POWDER, FOR SOLUTION INTRAMUSCULAR; INTRAVENOUS AS NEEDED
Status: CANCELLED | OUTPATIENT
Start: 2019-05-23

## 2019-04-24 RX ORDER — ONDANSETRON 2 MG/ML
8 INJECTION INTRAMUSCULAR; INTRAVENOUS ONCE
Status: CANCELLED | OUTPATIENT
Start: 2019-05-24

## 2019-04-24 RX ORDER — DIPHENHYDRAMINE HYDROCHLORIDE 50 MG/ML
50 INJECTION, SOLUTION INTRAMUSCULAR; INTRAVENOUS ONCE
Status: CANCELLED
Start: 2019-05-23

## 2019-04-24 RX ORDER — HYDROCORTISONE SODIUM SUCCINATE 100 MG/2ML
100 INJECTION, POWDER, FOR SOLUTION INTRAMUSCULAR; INTRAVENOUS AS NEEDED
Status: CANCELLED | OUTPATIENT
Start: 2019-05-24

## 2019-04-24 RX ORDER — ALBUTEROL SULFATE 0.83 MG/ML
2.5 SOLUTION RESPIRATORY (INHALATION) AS NEEDED
Status: CANCELLED
Start: 2019-05-24

## 2019-04-24 RX ORDER — DIPHENHYDRAMINE HYDROCHLORIDE 50 MG/ML
50 INJECTION, SOLUTION INTRAMUSCULAR; INTRAVENOUS AS NEEDED
Status: CANCELLED
Start: 2019-05-24

## 2019-04-24 RX ORDER — SODIUM CHLORIDE 9 MG/ML
10 INJECTION INTRAMUSCULAR; INTRAVENOUS; SUBCUTANEOUS AS NEEDED
Status: CANCELLED | OUTPATIENT
Start: 2019-05-24

## 2019-04-24 RX ORDER — SODIUM CHLORIDE 9 MG/ML
10 INJECTION INTRAMUSCULAR; INTRAVENOUS; SUBCUTANEOUS AS NEEDED
Status: CANCELLED | OUTPATIENT
Start: 2019-05-23

## 2019-04-24 RX ORDER — ONDANSETRON 2 MG/ML
8 INJECTION INTRAMUSCULAR; INTRAVENOUS AS NEEDED
Status: CANCELLED | OUTPATIENT
Start: 2019-05-23

## 2019-04-24 RX ORDER — DIPHENHYDRAMINE HYDROCHLORIDE 50 MG/ML
50 INJECTION, SOLUTION INTRAMUSCULAR; INTRAVENOUS AS NEEDED
Status: CANCELLED
Start: 2019-05-23

## 2019-04-24 RX ORDER — ONDANSETRON 2 MG/ML
8 INJECTION INTRAMUSCULAR; INTRAVENOUS ONCE
Status: CANCELLED | OUTPATIENT
Start: 2019-05-23

## 2019-04-24 RX ORDER — ACETAMINOPHEN 325 MG/1
650 TABLET ORAL ONCE
Status: CANCELLED
Start: 2019-05-23

## 2019-04-24 RX ORDER — ONDANSETRON 2 MG/ML
8 INJECTION INTRAMUSCULAR; INTRAVENOUS AS NEEDED
Status: CANCELLED | OUTPATIENT
Start: 2019-05-24

## 2019-04-24 RX ORDER — EPINEPHRINE 1 MG/ML
0.3 INJECTION, SOLUTION, CONCENTRATE INTRAVENOUS AS NEEDED
Status: CANCELLED | OUTPATIENT
Start: 2019-05-24

## 2019-04-24 RX ORDER — ACETAMINOPHEN 325 MG/1
650 TABLET ORAL AS NEEDED
Status: CANCELLED
Start: 2019-05-23

## 2019-04-24 RX ORDER — HEPARIN 100 UNIT/ML
300-500 SYRINGE INTRAVENOUS AS NEEDED
Status: CANCELLED
Start: 2019-05-24

## 2019-04-24 RX ORDER — SODIUM CHLORIDE 0.9 % (FLUSH) 0.9 %
10 SYRINGE (ML) INJECTION AS NEEDED
Status: CANCELLED
Start: 2019-05-23

## 2019-04-24 RX ORDER — EPINEPHRINE 1 MG/ML
0.3 INJECTION, SOLUTION, CONCENTRATE INTRAVENOUS AS NEEDED
Status: CANCELLED | OUTPATIENT
Start: 2019-05-23

## 2019-04-24 RX ORDER — SODIUM CHLORIDE 0.9 % (FLUSH) 0.9 %
10 SYRINGE (ML) INJECTION AS NEEDED
Status: CANCELLED
Start: 2019-05-24

## 2019-04-24 NOTE — PROGRESS NOTES
[id number] [facility] at [address] [place] [address]  [phone]    Hematology / Oncology Established Visit    Reason for Visit:   Lucille Goel is a 41 y.o. male who comes in for f/u of lymphoma. Hematology Oncology Treatment History:     Diagnosis: Follicular lymphoma    Stage: IV    Pathology:   11/13/18 right inguinal LN excision: Follicular lymphoma, high-grade (grade 3a of 3). Comment   The delaney architecture is entirely effaced by atypical lymphocytic proliferation with prominent nodular growth pattern. The majority of the atypical lymphocytes are small to medium in size and have irregular nuclear outlines and inconspicuous nucleoli, morphologically consistent with centrocytes. Scattered large lymphocytes with prominent nucleoli, consistent with centroblasts are identified (> 15 per high-power field). Focal increase in mitotic figures is noted. Occasional follicular dendritic cells are seen. By immunohistochemistry, the atypical lymphocytes are positive for CD20, PAX5, CD10, BCL6 and BCL2 (weak, focal) and negative for MUM1. CD3, CD5 and CD43 stain numerous background T lymphocytes. Ki-67 reveals a high proliferation index in the neoplastic follicles (overall 75-16%). Clinical history indicates 14 cm retroperitoneal mass with bilateral inguinal lymphadenopathy. In summary, the combined morphologic and phenotypic findings are diagnostic of a high-grade follicular lymphoma (grade 3a of 3). There is no evidence of diffuse large B-cell lymphoma. Flow cytometry analysis:   Monoclonal B-cell population (47 % of all cells) with mild increase in side and forward scatter properties expressing CD19, CD20, CD23 and CD10 with surface lambda light chain restriction. No phenotypically aberrant T-cell population. Flow cytometry was performed at [employer]     Prior Treatment: Obinutuzumab-CHOP.  Obinutuzumab: 1000 mg weekly on days 1, 8, 15 for cycle 1, then 1000 mg on day 1 q21 days for cycles 2-6, then monotherapy 1000 mg every 21 days for cycle 7, 8 with Cyclophosphamide 750mg/m2, Doxorubicin 50mg/m2, Vincristine 1.4mg/m2 on day 1 and Prednisone 100mg on Days 1-5, every 21 days for a total of 2 cycles completed late 1/2019. Regimen discontinued due to NSTEMI. Current Treatment: Obinutuzumab + Bendamustine: 1000 mg Obinutuzumab on day 1 + Bendamustine 90mg/m2 on days 1-2 on a 28-day cycle x 4 cycles     History of Present Illness:   Mr. Zapata is a 40 y/o male with HTN who comes in for evaluation of Follicular lymphoma. He states he was in his usual state of health in early November 2018, but then he developed low back pain and presented to the ER. The pain was described as constant, radiating to the buttocks, without exacerbating or alleviating factors, associated w/ increased urination, no n/v/d, no dysuria, no fever, he's unsure about weight loss. Work-up at outside hospital revealed a retroperitoneal mass seen on CT imaging, and he was transferred to [facility] for further work-up. CT there showed a large retroperitoneal mass encircling the aorta with invasion of the left renal hilum and left adrenal gland. There were bilateral inguinal lymph nodes and moderate left hydronephrosis. He was evaluated while at [facility] and was noted to have palpable nodes in his groin for approximately the past 1 month. No testicular mass, anorexia, weight loss, fevers, night sweats, chest pain, shortness of breath, abdominal pain or nausea. He underwent excisional LN biopsy of right inguinal LN. He was told that he had likely lymphoma. He was advised to follow up as outpatient for PET scan, bone marrow biopsy. However, patient states he was lost to f/u. He never received any calls or appointment details. His aunt urged patient to seek care elsewhere and his PCP recommended [facility].  At our first meeting on 12/13/18, he tells me that he was working full time, feeling well until early Nov 2018. When he developed the left lower back pain, he went to [facility] and [facility]. No fevers, chills, night sweats. He has lost 15 lbs in the past month due to low appetite. No n/v/d. No current constipation. No CP, SOB. No h/o cardiac disease aside from HTN, Hyperlipiemia. No hematochezia/melena, hematuria. He has HTN and XOL, which he was taking meds for, but has run out. Has no PCP currently. He is uninsured. He works as a cook, currently working part time. He has children aged 12 and 13. Interval history: Patient here for follow up and treatment. He started on the chantix dose pack on 3/30/19 and has cut down to 6 cigarettes a day. Reports \"shaking really bad,\" but not currently at this visit. Reports tremors are worse in hands, but still able to write. Does not drink EtOH. Denies dizziness or lightheadedness. Denies recent chest pain. Reports increased appetite, this could be due to the chantix/cutting back on smoking. Reports increased weakness, was unable to throw ball to his son for an extended period. He is walking every day. No issues with n/v/d, constipation. Denies recent infections. Denies SOB. FAMILY HISTORY:  His father has a history of leukemia, currently in remission, treated at [facility]. LYMPHATICS:  Bilateral palpable inguinal adenopathy.     Past Medical History:   Diagnosis Date    Anxiety     Cancer [facility])     lymphoma Nov 2018 receiving chemo    Chronic pain     lower back- lymphoma    Hyperlipidemia     Hypertension     Lymphadenopathy 11/12/2018      Past Surgical History:   Procedure Laterality Date    HX HEART CATHETERIZATION  02/2019    HX OTHER SURGICAL  02/2019    cardiac stent    IR INJECTION PSEUDOANEURYSM  2/26/2019      Social History     Tobacco Use    Smoking status: Current Every Day Smoker     Packs/day: 1.00     Years: 20.00     Pack years: 20.00    Smokeless tobacco: Never Used   Substance Use Topics    Alcohol use: No     Frequency: Never      Family History   Problem Relation Age of Onset    Hypertension Father     Diabetes Father     Diabetes Mother      Current Outpatient Medications   Medication Sig    ALPRAZolam (XANAX) 1 mg tablet Take 1 Tab by mouth three (3) times daily as needed for Anxiety for up to 30 days. Max Daily Amount: 3 mg.    [START ON 5/10/2019] oxyCODONE IR (ROXICODONE) 5 mg immediate release tablet Take 1 Tab by mouth every four (4) hours as needed for Pain for up to 20 days. Max Daily Amount: 30 mg.    varenicline (CHANTIX) 1 mg tablet Take 1 Tab by mouth two (2) times a day for 90 days.  varenicline (CHANTIX STARTER RUDY) 0.5 mg (11)- 1 mg (42) DsPk On Days 1 to 3 take 0.5 mg once daily. Then on Days 4 to 7 take 0.5 mg twice daily. On Day 8 and forward take 1 mg twice daily for 11 weeks.  escitalopram oxalate (LEXAPRO) 10 mg tablet Take 1 Tab by mouth daily.  atorvastatin (LIPITOR) 80 mg tablet Take 1 Tab by mouth nightly.  clopidogrel (PLAVIX) 75 mg tab 1 Tab by Per NG tube route daily.  metoprolol succinate (TOPROL-XL) 25 mg XL tablet Take 1 Tab by mouth daily.  lisinopril (PRINIVIL, ZESTRIL) 10 mg tablet Take 1 Tab by mouth daily. DO NOT TAKE for 5 days    L. acidoph & paracasei- S therm- Bifido (AUSTIN-Q/RISAQUAD) 8 billion cell cap cap Take 1 Cap by mouth daily.  magic mouthwash solution Take 15-30 mL by mouth four (4) times daily. Magic mouth wash   Maalox  Lidocaine 2% viscous   Diphenhydramine oral solution    Swish and spit for mouth pain, ok to swallow for throat pain     Pharmacy to mix equal portions of ingredients to a total volume as indicated in the dispense amount.  prochlorperazine (COMPAZINE) 10 mg tablet Take 1 Tab by mouth every six (6) hours as needed.  senna-docusate (PERICOLACE) 8.6-50 mg per tablet Take 1 Tab by mouth daily.  ondansetron hcl (ZOFRAN) 8 mg tablet Take 1 Tab by mouth every eight (8) hours as needed for Nausea.  (Patient taking differently: Take  by mouth every eight (8) hours as needed for Nausea.)    naloxone (NARCAN) 4 mg/actuation nasal spray Use 1 spray intranasally, then discard. Repeat with new spray every 2 min as needed for opioid overdose symptoms, alternating nostrils.  aspirin delayed-release (ASPIR-81) 81 mg tablet Take 1 Tab by mouth daily. No current facility-administered medications for this visit. No Known Allergies     Review of Systems: A complete review of systems was obtained, negative except as described above. Physical Exam:     Visit Vitals  /84   Pulse 69   Temp 97.9 °F (36.6 °C) (Oral)   Resp 16   Ht 5' 7\" (1.702 m)   Wt 149 lb (67.6 kg)   SpO2 98%   BMI 23.34 kg/m²     ECOG PS: 0  General: Well developed, no acute distress  Eyes: PERRLA, EOMI, anicteric sclerae  HENT: Atraumatic, OP clear, poor dentition, multiple dental caries, no erythema,TMs intact without erythema  Neck: Supple  Lymphatic: No supraclavicular, axillary. R cervical 2cm LN absent. Bilateral small 2-3cm palpable inguinal adenopathy  Respiratory: CTAB, normal respiratory effort  CV: Normal rate, regular rhythm, no murmurs, no peripheral edema  GI: Soft, nontender, nondistended, no masses, no hepatomegaly, no splenomegaly  MS: Normal gait and station. Digits without clubbing or cyanosis. Skin: No rashes, ecchymoses, or petechiae. Normal temperature, turgor, and texture. Small nodule present on inner left thigh and along groin, firm to palpation. Neuro/Psych: Alert, oriented. 5/5 strength in all 4 extremities. Appropriate affect, normal judgment/insight. Results:     Lab Results   Component Value Date/Time    WBC 9.3 03/28/2019 09:29 AM    HGB 13.5 03/28/2019 09:29 AM    HCT 40.7 03/28/2019 09:29 AM    PLATELET 615 (L) 13/41/9051 09:29 AM    MCV 94.7 03/28/2019 09:29 AM    ABS.  NEUTROPHILS 6.2 03/28/2019 09:29 AM    Hemoglobin (POC) 15.0 06/05/2009 02:13 PM    Hematocrit (POC) 39 02/14/2019 01:24 PM     Lab Results   Component Value Date/Time    Sodium 139 03/28/2019 09:29 AM    Potassium 3.7 2019 09:29 AM    Chloride 108 2019 09:29 AM    CO2 25 2019 09:29 AM    Glucose 97 2019 09:29 AM    BUN 12 2019 09:29 AM    Creatinine 0.84 2019 09:29 AM    GFR est AA >60 2019 09:29 AM    GFR est non-AA >60 2019 09:29 AM    Calcium 9.2 2019 09:29 AM    Sodium (POC) 136 2019 01:24 PM    Potassium (POC) 3.9 2019 01:24 PM    Chloride (POC) 102 2019 01:24 PM    Glucose (POC) 249 (H) 02/15/2019 10:21 PM    BUN (POC) 14 2019 01:24 PM    Creatinine (POC) 0.9 2019 01:24 PM    Calcium, ionized (POC) 1.24 2019 01:24 PM     Lab Results   Component Value Date/Time    Bilirubin, total 0.4 2019 09:29 AM    ALT (SGPT) 46 2019 09:29 AM    AST (SGOT) 17 2019 09:29 AM    Alk. phosphatase 156 (H) 2019 09:29 AM    Protein, total 6.5 2019 09:29 AM    Albumin 3.8 2019 09:29 AM    Globulin 2.7 2019 09:29 AM     No results found for: IRON, FE, TIBC, IBCT, PSAT, FERR    No results found for: B12LT, FOL, RBCF  Lab Results   Component Value Date/Time    TSH 1.53 2016 04:40 AM     18:     Lab Results   Component Value Date/Time    Hepatitis A, IgM NONREACTIVE 2018 04:53 PM    Hepatitis B surface Ag <0.10 2018 04:53 PM    Hepatitis B core, IgM NONREACTIVE 2018 04:53 PM         Imagin/9/18 Abd/pelvis CT: IMPRESSION:  1. Interval development of a large retroperitoneal mass encircling the aorta with invasion of the left renal hilum and left adrenal gland. Several adjacent lymph nodes are seen extending into the peritoneum and underneath the  diaphragmatic natalie. This most likely represents lymphoma. 2. Several new bilateral enlarged inguinal lymph nodes also likely representing lymphoma. 3. Moderate left hydronephrosis with a delayed renal nephrogram related to decreased renal function.  This is related to the invasion of the renal hilum.    11/11/18 Chest CT: IMPRESSION:  Trace left pleural effusion. Bilateral lower lobe atelectasis. Large  retroperitoneal mass lesion again demonstrated. PET 12/18/18:  FINDINGS:  HEAD/NECK: Right palatine tonsil intense hypermetabolism, max SUV 18. Multilevel  bilateral cervical adenopathy, with max SUV 12 in a left supraclavicular node. Cerebral evaluation is limited by normal intense activity. CHEST: Solitary hypermetabolic left axillary node, max SUV 11. ABDOMEN/PELVIS: Bulky retroperitoneal mass max SUV 27, with several additional  small active abdomino-pelvic nodes. Bilateral inguinal nodes with max SUV 12 on  the left. SKELETON: No foci of abnormal hypermetabolism in the axial and visualized  appendicular skeleton. IMPRESSION:   1. Right palatine tonsil tumor involvement (Deauville 5). 2. Bilateral cervical delaney involvement (Deauville 5). 3. Left axillary node involvement (Deauville 5). 4. Bulky retroperitoneal lymphoma mass and additional smaller hypermetabolic  abdomino-pelvic nodes (Deauville 5). 5. Bilateral inguinal delaney involvement (Deauville 5). Deauville Five Point Scale  1. No uptake or no residual uptake (when used interim)  2. Slight uptake, but below blood pool (mediastinum)  3. Uptake above mediastinal, but below/equal to uptake in the liver  4. Uptake slightly to moderately higher than liver  5. Markedly increased uptake or any new lesion (on response evaluation)  Each FDG-avid (or previously FDG avid) lesion is rated independently. Reference values:  Mediastinal blood pool: 2.1 SUV  Liver (background): 2.2 SUV    PET/CT 2/05/19:   IMPRESSION:  1. No Foci of Abnormal Hypermetabolism (Deauville 1). 2. Resolved activity in the right palatine tonsil, bilateral cervical nodes,left axillary node, retroperitoneal/abdominal pelvic adenopathy, bilateral inguinal nodes. Echo 2/14/19:  Normal cavity size, wall thickness and systolic function (ejection fraction normal).  The muscle mass is normal. The cavity shape is normal. The estimated ejection fraction is 41 - 45%. Abnormal wall motion as described on the wall scoring diagram below. End-systolic volume is normal. Normal left ventricular strain. There is mild (grade 1) left ventricular diastolic dysfunction. Normal left ventricular diastolic pressure. End-diastolic volume is normal.    LE arterial duplex 2/22/19:  There is evidence of left groin pseudoaneurysm noted arising from distal common femoral artery, pseudo lobe measures 2.32cm x 2.58cm and pseudo neck length measuring 0.63cm. There is no evidence of hemodynamically significant left lower extremity arterial obstruction. JACLYN is 1.03 on the right and 1.02 on the left. LE arterial duplex s/p Thrombin Injection to Pseudoaneurysm 2/26/19:  Successful thrombin injection procedure of the left groin with no further flow seen. No evidence of hemodnyamically significant obstruction in the left lower extremity. Left lower extremity arterial duplex performed. Confirmed pseudoaneurysm in left groin with small neck. Following thrombin injection, no further flow seen in the pseudoaneurysm. The left common femoral, profunda femoral, femoral, popliteal, posterior tibial and anterior arteries were imaged. Mainly triphasic flow was seen with no evidence of significantly elevated velocities. Repeat LE arterial duplex 2/27/19:  Continued thrombosed left groin pseudoaneurysm following thrombin injection on 02/26/2019. No flow or color fill is identified. The hematoma measures approximately 2.1 x 2.9 cm in diameter. The common femoral, deep femoral, femoral, and popliteal arteries are patent with mainly tri-phasic flow and no significant hemodynamically obstruction is noted.       Assessment & Plan:   Kendall Dalye SrTj is a 41 y.o. male comes in for evaluation and management of lymphoma. 1. Follicular lymphoma: Grade 3a.  Although grade 3a disease is considered more indolent and can be treated like grade 1/2 disease, this patient has indications for treatment: bulky disease encircling the aorta causing symptoms. Bone marrow negative for lymphoma, but was hypercellular. BR better than RCHOP, but based on GALLIUM study, Obinutuzumab-based induction and maintenance prolongs PFS over that seen with rituximab-based therapy. Therefore, I have chosen to treat patient with O-CHOP regimen for 6 cycles, followed by possible maintenance Obinutuzumab. We discussed the risks and benefits of O-CHOP chemotherapy. The potential side effects include, but are not limited to: nausea, vomiting, diarrhea, constipation, Flu-like symptoms, infusion reaction, allergic reaction, flushing, taste changes, increased risk of infection, anemia, fatigue, alopecia, neuropathy, nail changes, cardiac damage, mucositis, myleosuppression, infertility and rarely, death. Patient completed chemoteaching and received a chemotherapy education folder. CR after 2 cycles of O-CHOP, but switched regiment Bendamustine-O due to cardiac event. Chemotherapy consent signed and patient educated about side effects including: cytopenias, infections, bleeding, n/v/d, hair loss, skin rash, secondary malignancy, kidney or liver toxicity. He will remain on this regimen every 4 weeks for a total of 4 cycles. -- Proceed with cycle 3 (cycle 5/6 of chemotherapy overall), day 1 Riddhi-O   -- Return in 4 weeks for cycle 4, MD visit, labs (6/6 chemotherapy overall) on 5/23  -- Repeat PET after cycle 4     2. Use of cardiotoxic chemotherapy: Discussed risks/benefits of using doxorubicin. Echo on 12/17/18 showed EF 65%, but drop to EF 45% immediately after MI. Has seen Dr. Alli Villasenor who will follow up again in 1 month. 3. Neoplasm related pain / Anxiety: Left lower back pain. Taking Oxycodone 5mg bid prn. Signed pain contract on 12/28/18. Counseled on adding SSRI if anxiety becomes worse.    -- Continue Oxycodone 5mg only twice a day, 80 tabs refilled 1/24/19  -- Ativan 0.5mg bid prn, Lexapro 10mg daily for anxiety. -- Follow up with [facility] on 5/9/19   -- I discussed the subject of cutting down on his opioids as his tumor is shrinking. We will continue to discuss. 4. HTN / Hyperlipidemia: Well controlled today on BB, ACEI. 5. Tobacco abuse: Reiterated strong recommendation for smoking cessation in the setting of recent MI. Discussed cessation strategies and pt does not want Wellbutrin given past experience with it. Doing well on Chantix 1mg bid and has cut down to 6 cigs/day. -- Continue cutting down on cigarrettes, continue Chantix. 6. Dental infection: Caused neutropenic fever and hospital admission recently. No abscess. Blood cultures negative. Treated with IV antibiotics followed by Augmentin. -- Dental evaluation vs OMFS recommended. Stuart Gutiérrez in [address] met with him about affordable dental care options. Unable to afford dental care at this time. 7. H/o STEMI / Cardiac arrest: P/w CP on 2/14/19 followed by collapse and cardiac arrest. Cardiac cath at [facility] by Dr. Griselda Bautista revealed 90-95% occlusion of proximal LAD, TOM placed. Dr. Griselda Bautista [phone]. Dr. Juan C Sanches recommends: Continue aspirin and Plavix along with high-dose Lipitor and metoprolol.   -- 3/29/19  Met with Dr. Juan C Sanches in Cardiology, next follow up 4/29/19  -- Missed cardiac rehab appointments states he did not receive a call. -- On dual antiplatelet therapy aspirin and clopidogrel  -- On BB, ACEI, statin      8. H/o Left groin pseudoaneurysm: resolved. 9. Tremors: Started within the past few weeks, will continue to monitor. Denies dizziness/lightheadedness, chest tyler n or SOB when this occurs. Does not interfere with writing or buttoning clothing. Emotional well being: Pt is coping well with his/her disease and has excellent support. Met with SW in the past as well as today. I appreciate the opportunity to participate in Mr. Fe Vasquez Sr.'s care.    Signed By: Kizzy Caro MD     April 24, 2019

## 2019-04-25 ENCOUNTER — OFFICE VISIT (OUTPATIENT)
Dept: ONCOLOGY | Age: 44
End: 2019-04-25

## 2019-04-25 ENCOUNTER — HOSPITAL ENCOUNTER (OUTPATIENT)
Dept: INFUSION THERAPY | Age: 44
Discharge: HOME | End: 2019-04-25
Payer: COMMERCIAL

## 2019-04-25 VITALS
TEMPERATURE: 97.9 F | RESPIRATION RATE: 16 BRPM | SYSTOLIC BLOOD PRESSURE: 122 MMHG | HEIGHT: 67 IN | OXYGEN SATURATION: 98 % | BODY MASS INDEX: 23.39 KG/M2 | WEIGHT: 149 LBS | DIASTOLIC BLOOD PRESSURE: 84 MMHG | HEART RATE: 69 BPM

## 2019-04-25 VITALS
WEIGHT: 149.6 LBS | DIASTOLIC BLOOD PRESSURE: 71 MMHG | HEART RATE: 63 BPM | BODY MASS INDEX: 23.48 KG/M2 | TEMPERATURE: 96.6 F | RESPIRATION RATE: 18 BRPM | HEIGHT: 67 IN | SYSTOLIC BLOOD PRESSURE: 112 MMHG

## 2019-04-25 DIAGNOSIS — C82.90 FOLLICULAR LYMPHOMA, UNSPECIFIED FOLLICULAR LYMPHOMA TYPE, UNSPECIFIED BODY REGION (HCC): Primary | ICD-10-CM

## 2019-04-25 DIAGNOSIS — I21.4 NSTEMI (NON-ST ELEVATED MYOCARDIAL INFARCTION) (HCC): ICD-10-CM

## 2019-04-25 DIAGNOSIS — Z51.81 ENCOUNTER FOR MONITORING CARDIOTOXIC DRUG THERAPY: ICD-10-CM

## 2019-04-25 DIAGNOSIS — Z79.899 ENCOUNTER FOR MONITORING CARDIOTOXIC DRUG THERAPY: ICD-10-CM

## 2019-04-25 DIAGNOSIS — Z51.11 CHEMOTHERAPY MANAGEMENT, ENCOUNTER FOR: ICD-10-CM

## 2019-04-25 DIAGNOSIS — C82.33 FOLLICULAR LYMPHOMA GRADE IIIA OF INTRA-ABDOMINAL LYMPH NODES (HCC): Primary | ICD-10-CM

## 2019-04-25 DIAGNOSIS — Z71.6 TOBACCO ABUSE COUNSELING: ICD-10-CM

## 2019-04-25 DIAGNOSIS — R25.1 TREMORS OF NERVOUS SYSTEM: ICD-10-CM

## 2019-04-25 LAB
ALBUMIN SERPL-MCNC: 3.7 G/DL (ref 3.5–5)
ALBUMIN/GLOB SERPL: 1.4 {RATIO} (ref 1.1–2.2)
ALP SERPL-CCNC: 136 U/L (ref 45–117)
ALT SERPL-CCNC: 100 U/L (ref 12–78)
ANION GAP SERPL CALC-SCNC: 6 MMOL/L (ref 5–15)
AST SERPL-CCNC: 59 U/L (ref 15–37)
BASOPHILS # BLD: 0.1 K/UL (ref 0–0.1)
BASOPHILS NFR BLD: 1 % (ref 0–1)
BILIRUB SERPL-MCNC: 0.2 MG/DL (ref 0.2–1)
BUN SERPL-MCNC: 8 MG/DL (ref 6–20)
BUN/CREAT SERPL: 8 (ref 12–20)
CALCIUM SERPL-MCNC: 8.7 MG/DL (ref 8.5–10.1)
CHLORIDE SERPL-SCNC: 108 MMOL/L (ref 97–108)
CO2 SERPL-SCNC: 27 MMOL/L (ref 21–32)
CREAT SERPL-MCNC: 1.03 MG/DL (ref 0.7–1.3)
DIFFERENTIAL METHOD BLD: ABNORMAL
EOSINOPHIL # BLD: 0.9 K/UL (ref 0–0.4)
EOSINOPHIL NFR BLD: 12 % (ref 0–7)
ERYTHROCYTE [DISTWIDTH] IN BLOOD BY AUTOMATED COUNT: 13.3 % (ref 11.5–14.5)
GLOBULIN SER CALC-MCNC: 2.7 G/DL (ref 2–4)
GLUCOSE SERPL-MCNC: 136 MG/DL (ref 65–100)
HCT VFR BLD AUTO: 42.1 % (ref 36.6–50.3)
HGB BLD-MCNC: 14.2 G/DL (ref 12.1–17)
IMM GRANULOCYTES # BLD AUTO: 0 K/UL (ref 0–0.04)
IMM GRANULOCYTES NFR BLD AUTO: 0 % (ref 0–0.5)
LYMPHOCYTES # BLD: 1 K/UL (ref 0.8–3.5)
LYMPHOCYTES NFR BLD: 14 % (ref 12–49)
MCH RBC QN AUTO: 32.1 PG (ref 26–34)
MCHC RBC AUTO-ENTMCNC: 33.7 G/DL (ref 30–36.5)
MCV RBC AUTO: 95 FL (ref 80–99)
MONOCYTES # BLD: 0.6 K/UL (ref 0–1)
MONOCYTES NFR BLD: 8 % (ref 5–13)
NEUTS SEG # BLD: 4.8 K/UL (ref 1.8–8)
NEUTS SEG NFR BLD: 65 % (ref 32–75)
NRBC # BLD: 0 K/UL (ref 0–0.01)
NRBC BLD-RTO: 0 PER 100 WBC
PLATELET # BLD AUTO: 141 K/UL (ref 150–400)
PMV BLD AUTO: 11.4 FL (ref 8.9–12.9)
POTASSIUM SERPL-SCNC: 3.6 MMOL/L (ref 3.5–5.1)
PROT SERPL-MCNC: 6.4 G/DL (ref 6.4–8.2)
RBC # BLD AUTO: 4.43 M/UL (ref 4.1–5.7)
SODIUM SERPL-SCNC: 141 MMOL/L (ref 136–145)
WBC # BLD AUTO: 7.3 K/UL (ref 4.1–11.1)

## 2019-04-25 PROCEDURE — 74011000250 HC RX REV CODE- 250: Performed by: NURSE PRACTITIONER

## 2019-04-25 PROCEDURE — 36415 COLL VENOUS BLD VENIPUNCTURE: CPT

## 2019-04-25 PROCEDURE — 74011250636 HC RX REV CODE- 250/636: Performed by: NURSE PRACTITIONER

## 2019-04-25 PROCEDURE — 74011000258 HC RX REV CODE- 258: Performed by: NURSE PRACTITIONER

## 2019-04-25 PROCEDURE — 96413 CHEMO IV INFUSION 1 HR: CPT

## 2019-04-25 PROCEDURE — 80053 COMPREHEN METABOLIC PANEL: CPT

## 2019-04-25 PROCEDURE — 85025 COMPLETE CBC W/AUTO DIFF WBC: CPT

## 2019-04-25 PROCEDURE — 96411 CHEMO IV PUSH ADDL DRUG: CPT

## 2019-04-25 PROCEDURE — 96415 CHEMO IV INFUSION ADDL HR: CPT

## 2019-04-25 PROCEDURE — 96375 TX/PRO/DX INJ NEW DRUG ADDON: CPT

## 2019-04-25 PROCEDURE — 74011250637 HC RX REV CODE- 250/637: Performed by: NURSE PRACTITIONER

## 2019-04-25 PROCEDURE — 77030012965 HC NDL HUBR BBMI -A

## 2019-04-25 RX ORDER — ACETAMINOPHEN 325 MG/1
650 TABLET ORAL ONCE
Status: COMPLETED | OUTPATIENT
Start: 2019-04-25 | End: 2019-04-25

## 2019-04-25 RX ORDER — HEPARIN 100 UNIT/ML
300-500 SYRINGE INTRAVENOUS AS NEEDED
Status: ACTIVE | OUTPATIENT
Start: 2019-04-25 | End: 2019-04-25

## 2019-04-25 RX ORDER — SODIUM CHLORIDE 9 MG/ML
10 INJECTION INTRAMUSCULAR; INTRAVENOUS; SUBCUTANEOUS AS NEEDED
Status: ACTIVE | OUTPATIENT
Start: 2019-04-25 | End: 2019-04-25

## 2019-04-25 RX ORDER — ONDANSETRON 2 MG/ML
8 INJECTION INTRAMUSCULAR; INTRAVENOUS ONCE
Status: COMPLETED | OUTPATIENT
Start: 2019-04-25 | End: 2019-04-25

## 2019-04-25 RX ORDER — SODIUM CHLORIDE 0.9 % (FLUSH) 0.9 %
10 SYRINGE (ML) INJECTION AS NEEDED
Status: ACTIVE | OUTPATIENT
Start: 2019-04-25 | End: 2019-04-25

## 2019-04-25 RX ORDER — DIPHENHYDRAMINE HYDROCHLORIDE 50 MG/ML
50 INJECTION, SOLUTION INTRAMUSCULAR; INTRAVENOUS ONCE
Status: COMPLETED | OUTPATIENT
Start: 2019-04-25 | End: 2019-04-25

## 2019-04-25 RX ADMIN — ACETAMINOPHEN 650 MG: 325 TABLET ORAL at 10:59

## 2019-04-25 RX ADMIN — DIPHENHYDRAMINE HYDROCHLORIDE 50 MG: 50 INJECTION INTRAMUSCULAR; INTRAVENOUS at 11:00

## 2019-04-25 RX ADMIN — ONDANSETRON 8 MG: 2 INJECTION, SOLUTION INTRAMUSCULAR; INTRAVENOUS at 11:00

## 2019-04-25 RX ADMIN — Medication 10 ML: at 15:27

## 2019-04-25 RX ADMIN — DEXAMETHASONE SODIUM PHOSPHATE 12 MG: 4 INJECTION, SOLUTION INTRAMUSCULAR; INTRAVENOUS at 11:10

## 2019-04-25 RX ADMIN — BENDAMUSTINE HYDROCHLORIDE 162.5 MG: 25 INJECTION, SOLUTION INTRAVENOUS at 15:15

## 2019-04-25 RX ADMIN — Medication 10 ML: at 09:12

## 2019-04-25 RX ADMIN — SODIUM CHLORIDE 10 ML: 9 INJECTION, SOLUTION INTRAMUSCULAR; INTRAVENOUS; SUBCUTANEOUS at 09:12

## 2019-04-25 RX ADMIN — Medication 500 UNITS: at 15:27

## 2019-04-25 RX ADMIN — OBINUTUZUMAB 1000 MG: 1000 INJECTION, SOLUTION, CONCENTRATE INTRAVENOUS at 11:45

## 2019-04-25 NOTE — PROGRESS NOTES
[facility] Progress Note    Date: 2019    Name: Danuta Snyder. MRN: [id number]         : 1975    Mr. Sherren Pill Arrived ambulatory and in no distress for cycle 3 day 1 of Gazyva/Bendeka regimen. Assessment was completed, no acute issues at this time, no new complaints voiced. Port accessed without difficulty, labs drawn and in process. Chemotherapy Flowsheet 3/29/2019   Cycle C2D2    Date 3/29/2019   Drug / Regimen O-Bendamustine   Pre Meds -   Notes -         Proceeded to appt with Dr. Alicia Arellano. Mr. Vivi Jameson vitals were reviewed. Patient Vitals for the past 12 hrs:   Temp Pulse Resp BP   19 1527 -- 63 18 112/71   19 1505 96.6 °F (35.9 °C) (!) 57 18 113/71   19 1445 97.2 °F (36.2 °C) (!) 51 18 117/66   19 1415 97.5 °F (36.4 °C) (!) 55 16 107/65   19 1345 97.6 °F (36.4 °C) 60 16 103/64   19 1315 97.8 °F (36.6 °C) (!) 55 16 101/65   19 1245 97.4 °F (36.3 °C) (!) 50 16 106/64   19 1213 97.7 °F (36.5 °C) (!) 55 16 105/65   19 0907 97.5 °F (36.4 °C) -- 18 122/84     Lab results were obtained and reviewed. Medications:  Tylenol PO  Benadryl PO  Decadron IV  Zofran IV  Gazyva IV  Bendeka IV      Mr. Sherren Pill tolerated treatment well and was discharged from [place] [address] in stable condition. Port left accessed because pt is returning tomorrow 19, and flushed & heparinized per protocol. He is to return on 19 at 9:00 for his next appointment.     Iona Mohamud RN  2019

## 2019-04-26 ENCOUNTER — HOSPITAL ENCOUNTER (OUTPATIENT)
Dept: INFUSION THERAPY | Age: 44
Discharge: HOME | End: 2019-04-26
Payer: COMMERCIAL

## 2019-04-26 VITALS
BODY MASS INDEX: 23.49 KG/M2 | OXYGEN SATURATION: 100 % | RESPIRATION RATE: 18 BRPM | HEIGHT: 67 IN | WEIGHT: 149.7 LBS | HEART RATE: 59 BPM | SYSTOLIC BLOOD PRESSURE: 127 MMHG | TEMPERATURE: 97.6 F | DIASTOLIC BLOOD PRESSURE: 78 MMHG

## 2019-04-26 DIAGNOSIS — C82.90 FOLLICULAR LYMPHOMA, UNSPECIFIED FOLLICULAR LYMPHOMA TYPE, UNSPECIFIED BODY REGION (HCC): Primary | ICD-10-CM

## 2019-04-26 PROCEDURE — 74011250636 HC RX REV CODE- 250/636: Performed by: NURSE PRACTITIONER

## 2019-04-26 PROCEDURE — 96409 CHEMO IV PUSH SNGL DRUG: CPT

## 2019-04-26 PROCEDURE — 74011000258 HC RX REV CODE- 258: Performed by: NURSE PRACTITIONER

## 2019-04-26 PROCEDURE — 74011250636 HC RX REV CODE- 250/636: Performed by: INTERNAL MEDICINE

## 2019-04-26 PROCEDURE — 96377 APPLICATON ON-BODY INJECTOR: CPT

## 2019-04-26 PROCEDURE — 96375 TX/PRO/DX INJ NEW DRUG ADDON: CPT

## 2019-04-26 PROCEDURE — 96523 IRRIG DRUG DELIVERY DEVICE: CPT

## 2019-04-26 RX ORDER — SODIUM CHLORIDE 9 MG/ML
10 INJECTION INTRAMUSCULAR; INTRAVENOUS; SUBCUTANEOUS AS NEEDED
Status: DISPENSED | OUTPATIENT
Start: 2019-04-26 | End: 2019-04-26

## 2019-04-26 RX ORDER — SODIUM CHLORIDE 0.9 % (FLUSH) 0.9 %
10 SYRINGE (ML) INJECTION AS NEEDED
Status: ACTIVE | OUTPATIENT
Start: 2019-04-26 | End: 2019-04-26

## 2019-04-26 RX ORDER — ONDANSETRON 2 MG/ML
8 INJECTION INTRAMUSCULAR; INTRAVENOUS ONCE
Status: COMPLETED | OUTPATIENT
Start: 2019-04-26 | End: 2019-04-26

## 2019-04-26 RX ORDER — SODIUM CHLORIDE 9 MG/ML
25 INJECTION, SOLUTION INTRAVENOUS AS NEEDED
Status: DISCONTINUED | OUTPATIENT
Start: 2019-04-26 | End: 2019-04-27 | Stop reason: HOSPADM

## 2019-04-26 RX ORDER — HEPARIN 100 UNIT/ML
300-500 SYRINGE INTRAVENOUS AS NEEDED
Status: ACTIVE | OUTPATIENT
Start: 2019-04-26 | End: 2019-04-26

## 2019-04-26 RX ADMIN — Medication 20 ML: at 11:25

## 2019-04-26 RX ADMIN — SODIUM CHLORIDE 25 ML/HR: 900 INJECTION, SOLUTION INTRAVENOUS at 09:43

## 2019-04-26 RX ADMIN — PEGFILGRASTIM 6 MG: KIT SUBCUTANEOUS at 11:25

## 2019-04-26 RX ADMIN — ONDANSETRON 8 MG: 2 INJECTION, SOLUTION INTRAMUSCULAR; INTRAVENOUS at 09:38

## 2019-04-26 RX ADMIN — Medication 500 UNITS: at 11:25

## 2019-04-26 RX ADMIN — BENDAMUSTINE HYDROCHLORIDE 162.5 MG: 25 INJECTION, SOLUTION INTRAVENOUS at 11:01

## 2019-04-26 RX ADMIN — DEXAMETHASONE SODIUM PHOSPHATE 12 MG: 4 INJECTION, SOLUTION INTRAMUSCULAR; INTRAVENOUS at 10:22

## 2019-04-26 NOTE — PROGRESS NOTES
[facility] [place] [facility] Progress Note    Date: 2019    Name: Anitra Yu. MRN: [id number]         : 1975    Mr. Abhay Sullivan Arrived ambulatory at 0910 and in no distress for cycle 3 day 2 of chemo regimen. Assessment was completed, no acute issues at this time, no new complaints voiced. R chest PAC remains accessed from yesterday; brisk bld return, flushes w/o difficulty. Chemotherapy Flowsheet 2019   Cycle C3D2   Date 2019   Drug / Regimen Bendamustine   Pre Meds given   Notes given       Mr. Samia Turner vitals were reviewed. Visit Vitals  /78 (BP 1 Location: Right arm, BP Patient Position: Sitting)   Pulse (!) 59   Temp 97.6 °F (36.4 °C)   Resp 18   Ht 5' 7\" (1.702 m)   Wt 67.9 kg (149 lb 11.2 oz)   SpO2 100%   BMI 23.45 kg/m²         Pre-medications  were administered as ordered and chemotherapy was initiated. NS 25cc/hr  Zofran 8mg IV  Decadron 12mg IVPB  Bendamustine 162.5mg  Neulasta Onpro      Mr. Abhay Sullivan tolerated treatment well and was discharged from [place] [address] in stable condition at 1135. He is to return on 19 at 0900 for his next appointment.     Kaiser Lin  2019

## 2019-04-29 ENCOUNTER — OFFICE VISIT (OUTPATIENT)
Dept: CARDIOLOGY CLINIC | Age: 44
End: 2019-04-29

## 2019-04-29 VITALS
HEART RATE: 89 BPM | BODY MASS INDEX: 23.35 KG/M2 | OXYGEN SATURATION: 97 % | HEIGHT: 67 IN | SYSTOLIC BLOOD PRESSURE: 120 MMHG | WEIGHT: 148.8 LBS | DIASTOLIC BLOOD PRESSURE: 78 MMHG | RESPIRATION RATE: 20 BRPM

## 2019-04-29 DIAGNOSIS — I25.10 CORONARY ARTERY DISEASE INVOLVING NATIVE CORONARY ARTERY OF NATIVE HEART WITHOUT ANGINA PECTORIS: Primary | ICD-10-CM

## 2019-04-29 DIAGNOSIS — R07.9 CHEST PAIN, UNSPECIFIED TYPE: ICD-10-CM

## 2019-04-29 DIAGNOSIS — C82.33 FOLLICULAR LYMPHOMA GRADE IIIA OF INTRA-ABDOMINAL LYMPH NODES (HCC): ICD-10-CM

## 2019-04-29 DIAGNOSIS — I10 ESSENTIAL HYPERTENSION: ICD-10-CM

## 2019-04-29 DIAGNOSIS — E78.5 DYSLIPIDEMIA: ICD-10-CM

## 2019-04-29 RX ORDER — ATORVASTATIN CALCIUM 40 MG/1
40 TABLET, FILM COATED ORAL
Qty: 90 TAB | Refills: 3 | Status: SHIPPED | OUTPATIENT
Start: 2019-04-29 | End: 2020-03-31

## 2019-04-29 NOTE — PROGRESS NOTES
Office Follow-up    NAME: Naomi Carranza Sr.   :  1975  MRM:  [id number]    Date:  2019            Assessment:     Problem List  Date Reviewed: 2019          Codes Class Noted    Pseudoaneurysm ([facility] ICD-10-CM: I72.9  ICD-9-CM: 442.9  2019        ACS (acute coronary syndrome) ([facility] ICD-10-CM: I24.9  ICD-9-CM: 411.1  2019        Cardiac arrest [facility]) ICD-10-CM: I46.9  ICD-9-CM: 427.5  2019        Anxiety ICD-10-CM: F41.9  ICD-9-CM: 300.00  2019        Pain, dental ICD-10-CM: K08.89  ICD-9-CM: 525.9  2019        Sepsis ([facility] ICD-10-CM: A41.9  ICD-9-CM: 038.9, 995.91  2019        Neutropenic fever ([facility] ICD-10-CM: D70.9, R50.81  ICD-9-CM: 288.00, 780.61  4578        Follicular lymphoma ([facility] ICD-10-CM: C82.90  ICD-9-CM: 202.00  2018        Lymphadenopathy ICD-10-CM: R59.1  ICD-9-CM: 785.6  2018        Retroperitoneal mass ICD-10-CM: R19.00  ICD-9-CM: 789.30  11/10/2018        Hyperlipidemia ICD-10-CM: E78.5  ICD-9-CM: 272.4  Unknown        HTN (hypertension) ICD-10-CM: I10  ICD-9-CM: 401.9  2016        Tobacco abuse ICD-10-CM: Z72.0  ICD-9-CM: 305.1  2016        Left sided numbness ICD-10-CM: R20.0  ICD-9-CM: 782.0  2016                 Plan:     1. Chest pain: Symptoms although atypical there was recurrence of the symptoms today morning. 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 Continue aspirin and Plavix. Likely cause of chest pain is  of the RCA. But since he is having recurrent chest pain we will do a stress test in the near future. Continue Lipitor and metoprolol. 2. CAD: As above  3. Hypertension: Blood pressure is controlled. Continue metoprolol. 4. Dyslipidemia: Continue Lipitor. Subjective:     Ji Simms., a 41y.o. year-old who presents for followup.   He has known history of proximal LAD PCI status post cardiac arrest.  I had seen him one month ago with symptoms of chest pain. He is undergoing chemotherapy for lymphoma. On his previous visit his chest pain appeared to be noncardiac in origin and was self-limiting. We decided to wait and watch. He is returning today for follow-up. In the past 1 month he has not had any new episode of chest pain except for this morning when he woke up with left-sided chest heaviness which lasted for 20 minutes. He moved around and his chest heaviness improved after some time. There was no associated symptoms of shortness of breath or lightheadedness. -----------------------------------------------------------    HPI: Jaja Manning is a 41 y.o. male with past medical history significant for hypertension dyslipidemia was diagnosed with lymphoma in November 2018. He was undergoing chemotherapy and was at the [facility] on February 14, 2019 when he had sudden cardiac death and had AED CPR performed and was transferred to [facility] where a cardiac catheterization performed by Dr. Harlan Flor demonstrated a 95% proximal LAD lesion which he had stented. The distal RCA showed chronic total occlusion. The obtuse marginal did not have any lesions. He has been doing well since revascularization. He is back on his chemo however yesterday when he was awaiting his chemotherapy he had an episode of chest pain which was sharp in nature moderate intensity left anterior nonradiating and lasted for 1 minute. He has not had any recurrence of the chest pain. He has been taking his medications as prescribed. Echocardiogram recently on March 8, 2019 demonstrated normal LVEF without significant regional wall motion abnormality.     Exam:     Physical Exam:  Visit Vitals  /78 (BP 1 Location: Left arm, BP Patient Position: Sitting)   Pulse 89   Resp 20   Ht 5' 7\" (1.702 m)   Wt 148 lb 12.8 oz (67.5 kg)   SpO2 97%   BMI 23.31 kg/m²     General appearance - alert, well appearing, and in no distress  Mental status - affect appropriate to mood  Eyes - sclera anicteric, moist mucous membranes  Neck - supple, no significant adenopathy  Chest - clear to auscultation, no wheezes, rales or rhonchi  Heart - normal rate, regular rhythm, normal S1, S2, no murmurs, rubs, clicks or gallops  Abdomen - soft, nontender, nondistended, no masses or organomegaly  Extremities - peripheral pulses normal, no pedal edema  Skin - normal coloration  no rashes    Medications:     Current Outpatient Medications   Medication Sig    ALPRAZolam (XANAX) 1 mg tablet Take 1 Tab by mouth three (3) times daily as needed for Anxiety for up to 30 days. Max Daily Amount: 3 mg.    [START ON 5/10/2019] oxyCODONE IR (ROXICODONE) 5 mg immediate release tablet Take 1 Tab by mouth every four (4) hours as needed for Pain for up to 20 days. Max Daily Amount: 30 mg.    varenicline (CHANTIX) 1 mg tablet Take 1 Tab by mouth two (2) times a day for 90 days.  escitalopram oxalate (LEXAPRO) 10 mg tablet Take 1 Tab by mouth daily.  atorvastatin (LIPITOR) 80 mg tablet Take 1 Tab by mouth nightly.  clopidogrel (PLAVIX) 75 mg tab 1 Tab by Per NG tube route daily.  metoprolol succinate (TOPROL-XL) 25 mg XL tablet Take 1 Tab by mouth daily.  lisinopril (PRINIVIL, ZESTRIL) 10 mg tablet Take 1 Tab by mouth daily. DO NOT TAKE for 5 days    L. acidoph & paracasei- S therm- Bifido (AUSTIN-Q/RISAQUAD) 8 billion cell cap cap Take 1 Cap by mouth daily.  prochlorperazine (COMPAZINE) 10 mg tablet Take 1 Tab by mouth every six (6) hours as needed.  senna-docusate (PERICOLACE) 8.6-50 mg per tablet Take 1 Tab by mouth daily.  aspirin delayed-release (ASPIR-81) 81 mg tablet Take 1 Tab by mouth daily.  varenicline (CHANTIX STARTER RUDY) 0.5 mg (11)- 1 mg (42) DsPk On Days 1 to 3 take 0.5 mg once daily. Then on Days 4 to 7 take 0.5 mg twice daily. On Day 8 and forward take 1 mg twice daily for 11 weeks.  (Patient not taking: Reported on 4/29/2019)    magic mouthwash solution Take 15-30 mL by mouth four (4) times daily. Magic mouth wash   Maalox  Lidocaine 2% viscous   Diphenhydramine oral solution    Swish and spit for mouth pain, ok to swallow for throat pain     Pharmacy to mix equal portions of ingredients to a total volume as indicated in the dispense amount. (Patient not taking: Reported on 4/29/2019)    ondansetron hcl (ZOFRAN) 8 mg tablet Take 1 Tab by mouth every eight (8) hours as needed for Nausea. (Patient not taking: Reported on 4/29/2019)    naloxone [facility]) 4 mg/actuation nasal spray Use 1 spray intranasally, then discard. Repeat with new spray every 2 min as needed for opioid overdose symptoms, alternating nostrils. (Patient not taking: Reported on 4/29/2019)     No current facility-administered medications for this visit. Diagnostic Data Review:       3/8/19: ECHO- EF 56 - 60%; NWMA    2/14/19: CATH- Severe string like stenosis (95%) of the proximal LAD (culprit) and  of the distal RCA with bridging collaterals. Low LV end diastolic filling pressure  Successful PTCA then stenting of the proximal LAD with a 2.25 x 26 mm TOM ([place]) with 0% residual stenosis with brisk runoff and flow. 2/14/19: ECHO- EF 41 - 45%. Abnormal LV wall motion. Normal LV strain. Mild (grade 1) left ventricular diastolic dysfunction. 12/17/18: ECHO- LV Global longitudinal strain average -17.3%. EF nml at 65%. [address]    9/27/16: ECHO- EF 60-65%; NWMA;  No intracardiac shunt      Lab Review:     Lab Results   Component Value Date/Time    Cholesterol, total 170 09/28/2016 04:40 AM    HDL Cholesterol 23 09/28/2016 04:40 AM    LDL, calculated 96.4 09/28/2016 04:40 AM    Triglyceride 253 (H) 09/28/2016 04:40 AM    CHOL/HDL Ratio 7.4 (H) 09/28/2016 04:40 AM     Lab Results   Component Value Date/Time    Creatinine (POC) 0.9 02/14/2019 01:24 PM    Creatinine 1.03 04/25/2019 09:19 AM     Lab Results   Component Value Date/Time    BUN 8 04/25/2019 09:19 AM    BUN (POC) 14 02/14/2019 01:24 PM     Lab Results   Component Value Date/Time    Potassium 3.6 04/25/2019 09:19 AM     Lab Results   Component Value Date/Time    Hemoglobin A1c 5.6 09/28/2016 04:40 AM     Lab Results   Component Value Date/Time    Hemoglobin (POC) 15.0 06/05/2009 02:13 PM    HGB 14.2 04/25/2019 09:19 AM     Lab Results   Component Value Date/Time    PLATELET 398 (L) 32/12/5806 09:19 AM     No results for input(s): CPK, CKMB, TROIQ in the last 72 hours. No lab exists for component: CKQMB, CPKMB             ___________________________________________________    Abdelrahman Pierre.  Leonora Seals MD, [facility]

## 2019-04-29 NOTE — PATIENT INSTRUCTIONS
Exercise stress Nuclear- Chest pain,  RCA, PCI LAD  Change Lipitor to 40mg po daily. See Dr. Aleks Vera in 1 month.

## 2019-04-29 NOTE — PROGRESS NOTES
Medication changes made per VO of Dr. Kaitlyn Smith. DECREASE Lipitor to 40mg daily. Exercise Cardiolite entered per VO of Dr. Kaitlyn Smith.  Dx: CP,  RCA, PCI LAD

## 2019-04-29 NOTE — PROGRESS NOTES
Melanie Gould is a 41 y.o. male    Chief Complaint   Patient presents with    Follow-up     1 mo f/u    Coronary Artery Disease    Hypertension    Cholesterol Problem       Chest pain NO  SOB NO  Dizziness NO  Swelling NO  Recent hospital visit NO    Visit Vitals  /78 (BP 1 Location: Left arm, BP Patient Position: Sitting)   Pulse 89   Resp 20   Ht 5' 7\" (1.702 m)   Wt 148 lb 12.8 oz (67.5 kg)   SpO2 97%   BMI 23.31 kg/m²       1. Have you been to the ER, urgent care clinic since your last visit? Hospitalized since your last visit? NO    2. Have you seen or consulted any other health care providers outside of the [address] [place] [address] since your last visit? Include any pap smears or colon screening.   NO

## 2019-05-07 ENCOUNTER — TELEPHONE (OUTPATIENT)
Dept: PALLATIVE CARE | Age: 44
End: 2019-05-07

## 2019-05-07 NOTE — TELEPHONE ENCOUNTER
Called patient to advise/confirm upcoming appt with Dr. Jorge Luis Fonseca on   5/9/19   at 10:30am at [facility]. Patient confirmed. Also advised to please bring in your Drivers License and Insurance Card with you to appointment as well as any prescription pain medication in the original container with you to appt.

## 2019-05-09 ENCOUNTER — OFFICE VISIT (OUTPATIENT)
Dept: PALLATIVE CARE | Age: 44
End: 2019-05-09

## 2019-05-09 VITALS
BODY MASS INDEX: 23.39 KG/M2 | SYSTOLIC BLOOD PRESSURE: 129 MMHG | TEMPERATURE: 97.4 F | HEIGHT: 67 IN | HEART RATE: 84 BPM | DIASTOLIC BLOOD PRESSURE: 89 MMHG | RESPIRATION RATE: 16 BRPM | WEIGHT: 149 LBS | OXYGEN SATURATION: 99 %

## 2019-05-09 DIAGNOSIS — C82.33 FOLLICULAR LYMPHOMA GRADE IIIA OF INTRA-ABDOMINAL LYMPH NODES (HCC): ICD-10-CM

## 2019-05-09 DIAGNOSIS — R53.83 OTHER FATIGUE: ICD-10-CM

## 2019-05-09 DIAGNOSIS — G89.29 CHRONIC LEFT-SIDED LOW BACK PAIN WITHOUT SCIATICA: Primary | ICD-10-CM

## 2019-05-09 DIAGNOSIS — F17.200 SMOKING: ICD-10-CM

## 2019-05-09 DIAGNOSIS — F41.9 ANXIETY: ICD-10-CM

## 2019-05-09 DIAGNOSIS — M54.50 CHRONIC LEFT-SIDED LOW BACK PAIN WITHOUT SCIATICA: Primary | ICD-10-CM

## 2019-05-09 DIAGNOSIS — R63.0 POOR APPETITE: ICD-10-CM

## 2019-05-09 RX ORDER — OXYCODONE HYDROCHLORIDE 5 MG/1
5 TABLET ORAL
Qty: 120 TAB | Refills: 0 | Status: SHIPPED | OUTPATIENT
Start: 2019-05-10 | End: 2019-05-30

## 2019-05-09 RX ORDER — ALPRAZOLAM 1 MG/1
1 TABLET ORAL
Qty: 90 TAB | Refills: 0 | Status: SHIPPED | OUTPATIENT
Start: 2019-05-19 | End: 2019-06-21 | Stop reason: SDUPTHER

## 2019-05-09 NOTE — PROGRESS NOTES
Palliative Medicine Office Visit  Palliative Medicine Nurse Check In  [phone]    Patient Name: Rivka Tee. YOB: 1975      Date of Office Visit: 5/9/2019      Patient states:  Patient is here for a follow up no issues. From Check In Sheet (scanned in Media):  Has a medical provider talked with you about cardiopulmonary resuscitation (CPR)? [x] Yes   [] No   [] Unable to obtain    Nurse reminder to complete or update ACP FlowSheet:    Is ACP on the Problem List?    [] Yes    [x] No  IF ACP Document is ON FILE; Nurse to place ACP on Problem List     Is there an ACP Note in Chart Review/Note? [] Yes    [x] No   If NO: [address] Planning 4/25/2019   Patient's Healthcare Decision Maker is: Legal Next of Kin   Confirm Advance Directive None   Patient Would Like to Complete Advance Directive No   Does the patient have other document types -       Primary Decision Maker: Yasmeen Mague - Father - [phone]    Secondary Decision Maker: Sherrill Mosquera - Other Relative - [phone]  Advance Care Planning 5/9/2019   Patient's [place] is: Verbal statement (Legal Next of Kin remains as decision maker)   Confirm Advance Directive None   Patient Would Like to Complete Advance Directive No   Does the patient have other document types -          Is there anything that we should know about you as a person in order to provide you the best care possible? Have you been to the ER, urgent care clinic since your last visit? [] Yes   [x] No   [] Unable to obtain    Have you been hospitalized since your last visit? [] Yes   [x] No   [] Unable to obtain    Have you seen or consulted any other health care providers outside of the [address] [place] [address] since your last visit?    [] Yes   [x] No   [] Unable to obtain    Functional status (describe):     Last BM: yesterday     accessed (date):  5/8/19    Bottle review (for opioid pain medication):  Medication 1: oxyCODONE IR (ROXICODONE) 5 mg immediate release tablet       Date filled: 4/20/19  Directions: Take 1 Tab by mouth every four (4) hours as needed for Pain for up to 20 days. Max Daily Amount: 30 mg.   # filled: 120  # left:  10  # pills taking per day: 5-6  Last dose taken:5/9/2019      Medication 2:   Date filled:   Directions:   # filled:   # left:   # pills taking per day:  Last dose taken:    Medication 3:   Date filled:   Directions:   # filled:   # left:   # pills taking per day:  Last dose taken:    Medication 4:   Date filled:   Directions:   # filled:   # left:   # pills taking per day:  Last dose taken:

## 2019-05-09 NOTE — PROGRESS NOTES
Palliative Medicine Outpatient Services  [place]: [phone] ([phone])    Patient Name: Elizabeth Reddy. YOB: 1975    Date of Current Visit: 05/09/19  Location of Current Visit:    [] [facility] Office  [x] [facility] Office  [] [address] [place] [address]  [] Home  [] Other:      Date of Initial Visit: 2/19/19   Referral from: Asia Oro MD  Primary Care Physician: None      SUMMARY:   Elizabeth Guerrero is a 41y.o. year old with high-grade follicular lymphoma, who was referred to Palliative Medicine by Dr. Justin Garcia for symptom management and supportive care. He was diagnosed in 11/2018 after presenting with back pain. He is currently receiving systemic chemotherapy. He suffered cardiac arrest during cycle #3 due to previously undiagnosed severe stenosis of the LAD s/p PTCA and stent. He has since resumed chemotherapy. The patients social history includes: he is single. He lives with his father in [place]. He has 3 teenage children who live with their mother and with whom he has close contact. 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 He's applied for disability. Palliative Medicine is addressing the following current patient/family concerns: anxiety, depression, left mid- and low back pain related to malignancy, poor appetite, fatigue, advanced care planning. Initial Referral Intake note from Jenaro Olvera RN reviewed prior to visit   PALLIATIVE DIAGNOSES:       ICD-10-CM ICD-9-CM    1. Chronic left-sided low back pain without sciatica M54.5 724.2     G89.29 338.29    2. Anxiety F41.9 300.00 ALPRAZolam (XANAX) 1 mg tablet   3. Other fatigue R53.83 780.79    4. Smoking F17.200 305.1    5. Poor appetite R63.0 783.0    6.  Follicular lymphoma grade IIIa of intra-abdominal lymph nodes (HCC) C82.33 202.03 ALPRAZolam (XANAX) 1 mg tablet      oxyCODONE IR (ROXICODONE) 5 mg immediate release tablet          PLAN:   Patient Instructions     Dear Elizabeth Guerrero ,    It was a pleasure seeing you today in [facility]. We will see you again in 3 to 4 weeks. Your stated goal: continue treatment for your lymphoma with control of your anxiety and pain    Your described symptoms were: Fatigue: 5 Drowsiness: 1   Depression: 0 Pain: 2   Anxiety: 5 Nausea: 0   Anorexia: 0 Dyspnea: 0   Best Well-Bein Constipation: No   Other Problem (Comment): 0       This is the plan we talked about:    1. Anxiety  -This is manageable with Lexapro and alprazolam (Xanax)  -Continue escitalopram (Lexapro) 10-mg daily  -Continue alprazolam 1-mg three times daily as needed. 2. Pain   -Your pain is under good control on your current pain medication regimen.  -Continue oxycodone 5-mg: take 1 to 2 tabs every 4 hours as needed. You are currently taking 5 to 6 pills a day to control your pain. 3. Fatigue  -You're sleeping well at night  -You are most fatigued during the week after chemo    4. Nausea  -You experience nausea several days after your chemo    5. Chest pressure  -You saw Verna Bishop recently and he scheduled a stress test on 19    6. Bowels  -You're moving your bowels regularly    7. Smoking  -You're now taking Chantix  -Congratulations! You've been able to cut down on the number of cigarettes you smoke  -Keep working at cutting down with your smoking    8.  Lymphoma  -You have one more treatment, then will get scans  -You will follow up afterwards with Dr. Trinidad Books       This is what you have shared with us about Advance Care Planning:      Primary Decision Maker: Jacque Bartholomew - Father - [phone]    Secondary Decision Maker: Sherrill Mosquera - Other Relative - [phone]  [] Named in a scanned document   [x] Legal Next of Kin  [] Guardian    ACP documents you current have include:  [] Advance Directive or Living Will  [] Durable Do Not Resuscitate  [] Physician Orders for Scope of Treatment (POST)  [] Medical Power of   [] Other      The Palliative Medicine Team is here to support you and your family. Sincerely,      Kaila Hayward MD and the Palliative Medicine Team      Counseling and Coordination: note routed to care team       GOALS OF CARE / TREATMENT PREFERENCES:   [====Goals of Care====]  GOALS OF CARE:  Patient / health care proxy stated goals: See Patient Instructions / Summary    TREATMENT PREFERENCES:   Code Status:  [x] Attempt Resuscitation       [] Do Not Attempt Resuscitation    Advance Care Planning:  [x] The Pall Med Interdisciplinary Team has updated the ACP Navigator with Decision Maker and Patient Capacity      Primary Decision Maker: Ada Leonardo - Father - [phone]    Secondary Decision Maker: Sherrill Mosquera - Other Relative - [phone]  [] Named in a scanned document   [x] Legal Next of Kin  [] Guardian    Other:  (If patient appropriate for POST, consider using PALLPOST smart phrase here)    The palliative care team has discussed with patient / health care proxy about goals of care / treatment preferences for patient.  [====Goals of Care====]     PRESCRIPTIONS GIVEN:     Medications Ordered Today   Medications    ALPRAZolam (XANAX) 1 mg tablet     Sig: Take 1 Tab by mouth three (3) times daily as needed for Anxiety for up to 30 days. Max Daily Amount: 3 mg. Dispense:  90 Tab     Refill:  0    oxyCODONE IR (ROXICODONE) 5 mg immediate release tablet     Sig: Take 1 Tab by mouth every four (4) hours as needed for Pain for up to 20 days. Max Daily Amount: 30 mg.      Dispense:  120 Tab     Refill:  0           FOLLOW UP:     Future Appointments   Date Time Provider Dahlia Epps   5/23/2019  9:00 AM [facility]   5/23/2019  9:45 AM Karol Blake NP [facility]   5/24/2019  9:00 AM [facility]   5/31/2019 10:00 AM OLGA SANCHEZ [facility] SCHED   7/29/2019  2:40 PM Prashanth Amador MD [address] [place] [address] IN CARE:   Patient Care Team:  None as PCP - Colten Garcia MD (Hematology and Oncology)  Jennifer Vilchis MD as Physician (Palliative Medicine)  Jennifer Vilchis MD as Physician (Palliative Medicine)       HISTORY:   Reviewed patient-completed ESAS and advance care planning form. Reviewed patient record in prescription monitoring program.    CHIEF COMPLAINT:   Chief Complaint   Patient presents with    Anxiety       HPI/SUBJECTIVE:    The patient is: [x] Verbal / [] Nonverbal     He's doing OK. He's still waiting to hear about his disability. He's trying to cut down more with his smoking. It's hard with everything else that's going on. He doesn't have much pain now, it's more of an ache with his pain medicine. He didn't have to pay a co-pay the last time he picked up his oxycodone. He's been eating like a horse! He thinks this is from trying to quit smoking. He's surprised he hasn't gained any weight. He's going to cardiac rehab. Most of the time he watches TV, fools around the house. He's sleeping well at night. He walks every day except on rainy days. He's had a few spells of chest pressure. He saw his heart doctor recently and he has a stress test at the end of the month. He's moving his bowels without taking stool softeners or laxatives. He gets one more treatment, then has scans.     Clinical Pain Assessment (nonverbal scale for nonverbal patients):   [++++ Clinical Pain Assessment++++]  [++++Pain Severity++++]: Pain: 2  [++++Pain Character++++]: stabbing pain in back, groin feels like a pulled muscle  [++++Pain Duration++++]: months for back pain, weeks for groin pain  [++++Pain Effect++++]: little  [++++Pain Factors++++]: oxycodone helps with back pain, groin pain elicited by standing and walking  [++++Pain Frequency++++]: constant back pain with varying intensity, intermittent groin pain  [++++Pain Location++++]: left lower back, left groin and leg  [++++ Clinical Pain Assessment++++]       FUNCTIONAL ASSESSMENT:     Palliative Performance Scale (PPS):  PPS: 80       PSYCHOSOCIAL/SPIRITUAL SCREENING:     Any spiritual / Jew concerns:  [] Yes /  [x] No    Caregiver Burnout:  [] Yes /  [x] No /  [] No Caregiver Present      Anticipatory grief assessment:   [x] Normal  / [] Maladaptive       ESAS Anxiety: Anxiety: 5    ESAS Depression: Depression: 0       REVIEW OF SYSTEMS:     The following systems were [x] reviewed / [] unable to be reviewed  Systems: constitutional, ears/nose/mouth/throat, respiratory, gastrointestinal, genitourinary, musculoskeletal, integumentary, neurologic, psychiatric, endocrine. Positive findings noted below. Modified ESAS Completed by: provider   Fatigue: 5 Drowsiness: 1   Depression: 0 Pain: 2   Anxiety: 5 Nausea: 0   Anorexia: 0 Dyspnea: 0   Best Well-Bein Constipation: No   Other Problem (Comment): 0          PHYSICAL EXAM:     Wt Readings from Last 3 Encounters:   19 149 lb (67.6 kg)   19 148 lb 12.8 oz (67.5 kg)   19 149 lb 11.2 oz (67.9 kg)     Blood pressure 129/89, pulse 84, temperature 97.4 °F (36.3 °C), temperature source Oral, resp. rate 16, height 5' 7\" (1.702 m), weight 149 lb (67.6 kg), SpO2 99 %.   Last bowel movement: See Nursing Note    Constitutional: sitting in chair, appears rested  Eyes: pupils equal, anicteric  ENMT: no nasal discharge, moist mucous membranes  Cardiovascular: regular rhythm, no peripheral edema  Respiratory: breathing not labored, symmetric  Gastrointestinal: soft non-tender, +bowel sounds  Musculoskeletal: no deformity, no tenderness to palpation  Skin: warm, dry  Neurologic: following commands, moving all extremities  Psychiatric: full affect, no hallucinations  Other:       HISTORY:     Past Medical History:   Diagnosis Date    Anxiety     Cancer [facility])     lymphoma 2018 receiving chemo    Chronic pain     lower back- lymphoma    Hyperlipidemia     Hypertension     Lymphadenopathy 2018      Past Surgical History:   Procedure Laterality Date    HX HEART CATHETERIZATION  02/2019    HX OTHER SURGICAL  02/2019    cardiac stent    IR INJECTION PSEUDOANEURYSM  2/26/2019      Family History   Problem Relation Age of Onset    Hypertension Father     Diabetes Father     Diabetes Mother       History reviewed, no pertinent family history. Social History     Tobacco Use    Smoking status: Current Every Day Smoker     Packs/day: 1.00     Years: 20.00     Pack years: 20.00    Smokeless tobacco: Never Used   Substance Use Topics    Alcohol use: No     Frequency: Never     No Known Allergies   Current Outpatient Medications   Medication Sig    [START ON 5/19/2019] ALPRAZolam (XANAX) 1 mg tablet Take 1 Tab by mouth three (3) times daily as needed for Anxiety for up to 30 days. Max Daily Amount: 3 mg.    [START ON 5/10/2019] oxyCODONE IR (ROXICODONE) 5 mg immediate release tablet Take 1 Tab by mouth every four (4) hours as needed for Pain for up to 20 days. Max Daily Amount: 30 mg.    atorvastatin (LIPITOR) 40 mg tablet Take 1 Tab by mouth nightly. Indications: treatment to slow progression of coronary artery disease    varenicline (CHANTIX) 1 mg tablet Take 1 Tab by mouth two (2) times a day for 90 days.  escitalopram oxalate (LEXAPRO) 10 mg tablet Take 1 Tab by mouth daily.  clopidogrel (PLAVIX) 75 mg tab 1 Tab by Per NG tube route daily.  metoprolol succinate (TOPROL-XL) 25 mg XL tablet Take 1 Tab by mouth daily.  lisinopril (PRINIVIL, ZESTRIL) 10 mg tablet Take 1 Tab by mouth daily. DO NOT TAKE for 5 days    L. acidoph & paracasei- S therm- Bifido (AUSTIN-Q/RISAQUAD) 8 billion cell cap cap Take 1 Cap by mouth daily.  aspirin delayed-release (ASPIR-81) 81 mg tablet Take 1 Tab by mouth daily.  varenicline (CHANTIX STARTER RUDY) 0.5 mg (11)- 1 mg (42) DsPk On Days 1 to 3 take 0.5 mg once daily. Then on Days 4 to 7 take 0.5 mg twice daily. On Day 8 and forward take 1 mg twice daily for 11 weeks.     magic mouthwash solution Take 15-30 mL by mouth four (4) times daily. Magic mouth wash   Maalox  Lidocaine 2% viscous   Diphenhydramine oral solution    Swish and spit for mouth pain, ok to swallow for throat pain     Pharmacy to mix equal portions of ingredients to a total volume as indicated in the dispense amount. (Patient not taking: Reported on 4/29/2019)    prochlorperazine (COMPAZINE) 10 mg tablet Take 1 Tab by mouth every six (6) hours as needed.  senna-docusate (PERICOLACE) 8.6-50 mg per tablet Take 1 Tab by mouth daily.  ondansetron hcl (ZOFRAN) 8 mg tablet Take 1 Tab by mouth every eight (8) hours as needed for Nausea. (Patient not taking: Reported on 4/29/2019)    naloxone [facility]) 4 mg/actuation nasal spray Use 1 spray intranasally, then discard. Repeat with new spray every 2 min as needed for opioid overdose symptoms, alternating nostrils. (Patient not taking: Reported on 4/29/2019)     No current facility-administered medications for this visit. LAB DATA REVIEWED:     Lab Results   Component Value Date/Time    WBC 7.3 04/25/2019 09:19 AM    HGB 14.2 04/25/2019 09:19 AM    PLATELET 458 (L) 46/52/6514 09:19 AM     Lab Results   Component Value Date/Time    Sodium 141 04/25/2019 09:19 AM    Potassium 3.6 04/25/2019 09:19 AM    Chloride 108 04/25/2019 09:19 AM    CO2 27 04/25/2019 09:19 AM    BUN 8 04/25/2019 09:19 AM    Creatinine 1.03 04/25/2019 09:19 AM    Calcium 8.7 04/25/2019 09:19 AM    Magnesium 1.7 01/12/2019 04:05 AM    Phosphorus 2.0 (L) 01/12/2019 04:05 AM      Lab Results   Component Value Date/Time    AST (SGOT) 59 (H) 04/25/2019 09:19 AM    Alk.  phosphatase 136 (H) 04/25/2019 09:19 AM    Protein, total 6.4 04/25/2019 09:19 AM    Albumin 3.7 04/25/2019 09:19 AM    Globulin 2.7 04/25/2019 09:19 AM     Lab Results   Component Value Date/Time    INR 1.1 02/22/2019 08:18 PM    Prothrombin time 10.8 02/22/2019 08:18 PM    aPTT 27.8 02/22/2019 08:18 PM      No results found for: IRON, FE, TIBC, IBCT, PSAT, FERR        CONTROLLED SUBSTANCES SAFETY ASSESSMENT (IF ON CONTROLLED SUBSTANCES):     Reviewed opioid safety handout:  [x] Yes   [] No  24 hour opioid dose >150mg morphine equivalent/day:  [] Yes   [x] No  Benzodiazepines:  [x] Yes   [] No  Sleep apnea:  [] Yes   [x] No  Urine Toxicology Testing within last 6 months:  [] Yes   [] No  History of or new aberrant medication taking behaviors:  [] Yes   [] No  Has Narcan been prescribed [] Yes   [x] No          Total time:   Counseling / coordination time:   > 50% counseling / coordination?:

## 2019-05-09 NOTE — PATIENT INSTRUCTIONS
Dear Emma Paz. ,    It was a pleasure seeing you today in [facility]. We will see you again in 3 to 4 weeks. Your stated goal: continue treatment for your lymphoma with control of your anxiety and pain    Your described symptoms were: Fatigue: 5 Drowsiness: 1   Depression: 0 Pain: 2   Anxiety: 5 Nausea: 0   Anorexia: 0 Dyspnea: 0   Best Well-Bein Constipation: No   Other Problem (Comment): 0       This is the plan we talked about:    1. Anxiety  -This is manageable with Lexapro and alprazolam (Xanax)  -Continue escitalopram (Lexapro) 10-mg daily  -Continue alprazolam 1-mg three times daily as needed. 2. Pain   -Your pain is under good control on your current pain medication regimen.  -Continue oxycodone 5-mg: take 1 to 2 tabs every 4 hours as needed. You are currently taking 5 to 6 pills a day to control your pain. 3. Fatigue  -You're sleeping well at night  -You are most fatigued during the week after chemo    4. Nausea  -You experience nausea several days after your chemo    5. Chest pressure  -You saw Paul Oliva recently and he scheduled a stress test on 19    6. Bowels  -You're moving your bowels regularly    7. Smoking  -You're now taking Chantix  -Congratulations! You've been able to cut down on the number of cigarettes you smoke  -Keep working at cutting down with your smoking    8.  Lymphoma  -You have one more treatment, then will get scans  -You will follow up afterwards with Dr. Zoe Oneal       This is what you have shared with us about Advance Care Planning:      Primary Decision Maker: Sepideh Garnica - Father - [phone]    Secondary Decision Maker: Sherrill Mosquera - Other Relative - [phone]  [] Named in a scanned document   [x] Legal Next of Kin  [] Guardian    ACP documents you current have include:  [] Advance Directive or Living Will  [] Durable Do Not Resuscitate  [] Physician Orders for Scope of Treatment (POST)  [] Medical Power of   [] Other      The Palliative Medicine Team is here to support you and your family.          Sincerely,      Savage Armenta MD and the Palliative Medicine Team

## 2019-05-15 NOTE — PROGRESS NOTES
[id number] [facility] at [facility]  [phone]    Hematology / Oncology Established Visit    Reason for Visit:   Anjana Jeong is a 41 y.o. male who comes in for f/u of lymphoma. Hematology Oncology Treatment History:     Diagnosis: Follicular lymphoma    Stage: IV    Pathology:   11/13/18 right inguinal LN excision: Follicular lymphoma, high-grade (grade 3a of 3). Comment   The delaney architecture is entirely effaced by atypical lymphocytic proliferation with prominent nodular growth pattern. The majority of the atypical lymphocytes are small to medium in size and have irregular nuclear outlines and inconspicuous nucleoli, morphologically consistent with centrocytes. Scattered large lymphocytes with prominent nucleoli, consistent with centroblasts are identified (> 15 per high-power field). Focal increase in mitotic figures is noted. Occasional follicular dendritic cells are seen. By immunohistochemistry, the atypical lymphocytes are positive for CD20, PAX5, CD10, BCL6 and BCL2 (weak, focal) and negative for MUM1. CD3, CD5 and CD43 stain numerous background T lymphocytes. Ki-67 reveals a high proliferation index in the neoplastic follicles (overall 28-42%). Clinical history indicates 14 cm retroperitoneal mass with bilateral inguinal lymphadenopathy. In summary, the combined morphologic and phenotypic findings are diagnostic of a high-grade follicular lymphoma (grade 3a of 3). There is no evidence of diffuse large B-cell lymphoma. Flow cytometry analysis:   Monoclonal B-cell population (47 % of all cells) with mild increase in side and forward scatter properties expressing CD19, CD20, CD23 and CD10 with surface lambda light chain restriction. No phenotypically aberrant T-cell population. Flow cytometry was performed at [facility]     Prior Treatment: Obinutuzumab-CHOP.  Obinutuzumab: 1000 mg weekly on days 1, 8, 15 for cycle 1, then 1000 mg on day 1 q21 days for cycles 2-6, then monotherapy 1000 mg every 21 days for cycle 7, 8 with Cyclophosphamide 750mg/m2, Doxorubicin 50mg/m2, Vincristine 1.4mg/m2 on day 1 and Prednisone 100mg on Days 1-5, every 21 days for a total of 2 cycles completed late 1/2019. Regimen discontinued due to NSTEMI. Current Treatment: Obinutuzumab + Bendamustine: 1000 mg Obinutuzumab on day 1 + Bendamustine 90mg/m2 on days 1-2 on a 28-day cycle x 4 cycles     History of Present Illness:   Mr. Zapata is a 40 y/o male with HTN who comes in for evaluation of Follicular lymphoma. He states he was in his usual state of health in early November 2018, but then he developed low back pain and presented to the ER. The pain was described as constant, radiating to the buttocks, without exacerbating or alleviating factors, associated w/ increased urination, no n/v/d, no dysuria, no fever, he's unsure about weight loss. Work-up at outside hospital revealed a retroperitoneal mass seen on CT imaging, and he was transferred to [facility] for further work-up. CT there showed a large retroperitoneal mass encircling the aorta with invasion of the left renal hilum and left adrenal gland. There were bilateral inguinal lymph nodes and moderate left hydronephrosis. He was evaluated while at [facility] and was noted to have palpable nodes in his groin for approximately the past 1 month. No testicular mass, anorexia, weight loss, fevers, night sweats, chest pain, shortness of breath, abdominal pain or nausea. He underwent excisional LN biopsy of right inguinal LN. He was told that he had likely lymphoma. He was advised to follow up as outpatient for PET scan, bone marrow biopsy. However, patient states he was lost to f/u. He never received any calls or appointment details. His aunt urged patient to seek care elsewhere and his PCP recommended [facility].  At our first meeting on 12/13/18, he tells me that he was working full time, feeling well until early Nov 2018. When he developed the left lower back pain, he went to [facility]. No fevers, chills, night sweats. He has lost 15 lbs in the past month due to low appetite. No n/v/d. No current constipation. No CP, SOB. No h/o cardiac disease aside from HTN, Hyperlipiemia. No hematochezia/melena, hematuria. He has HTN and XOL, which he was taking meds for, but has run out. Has no PCP currently. He is uninsured. He works as a cook, currently working part time. He has children aged 12 and 13. Interval history: Patient here for follow up and treatment. Reports mild fatigue, but very busy with family life. He started on the chantix dose pack on 3/30/19 and has cut down to 5 cigarettes a day. Reports ongoing intermittent tremors are worse in hands, but still able to write. Does not drink EtOH. Denies dizziness or lightheadedness. Denies recent chest pain. Reports increased appetite, this could be due to the chantix/cutting back on smoking. Abhi Reasoner He is walking every day. No issues with n/v/d, constipation. Denies recent infections. Denies SOB. FAMILY HISTORY:  His father has a history of leukemia, currently in remission, treated at [facility]. LYMPHATICS:  Bilateral palpable inguinal adenopathy.     Past Medical History:   Diagnosis Date    Anxiety     Cancer [facility])     lymphoma Nov 2018 receiving chemo    Chronic pain     lower back- lymphoma    Hyperlipidemia     Hypertension     Lymphadenopathy 11/12/2018      Past Surgical History:   Procedure Laterality Date    HX HEART CATHETERIZATION  02/2019    HX OTHER SURGICAL  02/2019    cardiac stent    IR INJECTION PSEUDOANEURYSM  2/26/2019      Social History     Tobacco Use    Smoking status: Current Every Day Smoker     Packs/day: 1.00     Years: 20.00     Pack years: 20.00    Smokeless tobacco: Never Used   Substance Use Topics    Alcohol use: No     Frequency: Never      Family History   Problem Relation Age of Onset    Hypertension Father     Diabetes Father    [facility] Diabetes Mother      Current Outpatient Medications   Medication Sig    [START ON 5/19/2019] ALPRAZolam (XANAX) 1 mg tablet Take 1 Tab by mouth three (3) times daily as needed for Anxiety for up to 30 days. Max Daily Amount: 3 mg.  oxyCODONE IR (ROXICODONE) 5 mg immediate release tablet Take 1 Tab by mouth every four (4) hours as needed for Pain for up to 20 days. Max Daily Amount: 30 mg.    atorvastatin (LIPITOR) 40 mg tablet Take 1 Tab by mouth nightly. Indications: treatment to slow progression of coronary artery disease    varenicline (CHANTIX) 1 mg tablet Take 1 Tab by mouth two (2) times a day for 90 days.  varenicline (CHANTIX STARTER RUDY) 0.5 mg (11)- 1 mg (42) DsPk On Days 1 to 3 take 0.5 mg once daily. Then on Days 4 to 7 take 0.5 mg twice daily. On Day 8 and forward take 1 mg twice daily for 11 weeks.  escitalopram oxalate (LEXAPRO) 10 mg tablet Take 1 Tab by mouth daily.  clopidogrel (PLAVIX) 75 mg tab 1 Tab by Per NG tube route daily.  metoprolol succinate (TOPROL-XL) 25 mg XL tablet Take 1 Tab by mouth daily.  lisinopril (PRINIVIL, ZESTRIL) 10 mg tablet Take 1 Tab by mouth daily. DO NOT TAKE for 5 days    L. acidoph & paracasei- S therm- Bifido (AUSTIN-Q/RISAQUAD) 8 billion cell cap cap Take 1 Cap by mouth daily.  magic mouthwash solution Take 15-30 mL by mouth four (4) times daily. Magic mouth wash   Maalox  Lidocaine 2% viscous   Diphenhydramine oral solution    Swish and spit for mouth pain, ok to swallow for throat pain     Pharmacy to mix equal portions of ingredients to a total volume as indicated in the dispense amount. (Patient not taking: Reported on 4/29/2019)    prochlorperazine (COMPAZINE) 10 mg tablet Take 1 Tab by mouth every six (6) hours as needed.  senna-docusate (PERICOLACE) 8.6-50 mg per tablet Take 1 Tab by mouth daily.  ondansetron hcl (ZOFRAN) 8 mg tablet Take 1 Tab by mouth every eight (8) hours as needed for Nausea.  (Patient not taking: Reported on 4/29/2019)    naloxone (NARCAN) 4 mg/actuation nasal spray Use 1 spray intranasally, then discard. Repeat with new spray every 2 min as needed for opioid overdose symptoms, alternating nostrils. (Patient not taking: Reported on 4/29/2019)    aspirin delayed-release (ASPIR-81) 81 mg tablet Take 1 Tab by mouth daily. No current facility-administered medications for this visit. No Known Allergies     Review of Systems: A complete review of systems was obtained, negative except as described above. Physical Exam:     Visit Vitals  /82   Pulse 71   Temp 97.6 °F (36.4 °C) (Oral)   Resp 16   Ht 5' 7\" (1.702 m)   Wt 153 lb (69.4 kg)   SpO2 100%   BMI 23.96 kg/m²     ECOG PS: 0  General: Well developed, no acute distress  Eyes: PERRLA, EOMI, anicteric sclerae  HENT: Atraumatic, OP clear, poor dentition, multiple dental caries, no erythema,TMs intact without erythema  Neck: Supple  Lymphatic: No supraclavicular, axillary. R cervical 2cm LN absent. Bilateral small 2-3cm palpable inguinal adenopathy  Respiratory: CTAB, normal respiratory effort  CV: Normal rate, regular rhythm, no murmurs, no peripheral edema  GI: Soft, nontender, nondistended, no masses, no hepatomegaly, no splenomegaly  MS: Normal gait and station. Digits without clubbing or cyanosis. Skin: No rashes, ecchymoses, or petechiae. Normal temperature, turgor, and texture. Small nodule present on inner left thigh and along groin, firm to palpation. Neuro/Psych: Alert, oriented. 5/5 strength in all 4 extremities. Appropriate affect, normal judgment/insight. Results:     Lab Results   Component Value Date/Time    WBC 7.3 04/25/2019 09:19 AM    HGB 14.2 04/25/2019 09:19 AM    HCT 42.1 04/25/2019 09:19 AM    PLATELET 921 (L) 58/84/5359 09:19 AM    MCV 95.0 04/25/2019 09:19 AM    ABS.  NEUTROPHILS 4.8 04/25/2019 09:19 AM    Hemoglobin (POC) 15.0 06/05/2009 02:13 PM    Hematocrit (POC) 39 02/14/2019 01:24 PM     Lab Results   Component Value Date/Time    Sodium 141 2019 09:19 AM    Potassium 3.6 2019 09:19 AM    Chloride 108 2019 09:19 AM    CO2 27 2019 09:19 AM    Glucose 136 (H) 2019 09:19 AM    BUN 8 2019 09:19 AM    Creatinine 1.03 2019 09:19 AM    GFR est AA >60 2019 09:19 AM    GFR est non-AA >60 2019 09:19 AM    Calcium 8.7 2019 09:19 AM    Sodium (POC) 136 2019 01:24 PM    Potassium (POC) 3.9 2019 01:24 PM    Chloride (POC) 102 2019 01:24 PM    Glucose (POC) 249 (H) 02/15/2019 10:21 PM    BUN (POC) 14 2019 01:24 PM    Creatinine (POC) 0.9 2019 01:24 PM    Calcium, ionized (POC) 1.24 2019 01:24 PM     Lab Results   Component Value Date/Time    Bilirubin, total 0.2 2019 09:19 AM    ALT (SGPT) 100 (H) 2019 09:19 AM    AST (SGOT) 59 (H) 2019 09:19 AM    Alk. phosphatase 136 (H) 2019 09:19 AM    Protein, total 6.4 2019 09:19 AM    Albumin 3.7 2019 09:19 AM    Globulin 2.7 2019 09:19 AM     No results found for: IRON, FE, TIBC, IBCT, PSAT, FERR    No results found for: B12LT, FOL, RBCF  Lab Results   Component Value Date/Time    TSH 1.53 2016 04:40 AM     18:     Lab Results   Component Value Date/Time    Hepatitis A, IgM NONREACTIVE 2018 04:53 PM    Hepatitis B surface Ag <0.10 2018 04:53 PM    Hepatitis B core, IgM NONREACTIVE 2018 04:53 PM         Imagin/9/18 Abd/pelvis CT: IMPRESSION:  1. Interval development of a large retroperitoneal mass encircling the aorta with invasion of the left renal hilum and left adrenal gland. Several adjacent lymph nodes are seen extending into the peritoneum and underneath the  diaphragmatic natalie. This most likely represents lymphoma. 2. Several new bilateral enlarged inguinal lymph nodes also likely representing lymphoma. 3. Moderate left hydronephrosis with a delayed renal nephrogram related to decreased renal function.  This is related to the invasion of the renal hilum. 11/11/18 Chest CT: IMPRESSION:  Trace left pleural effusion. Bilateral lower lobe atelectasis. Large  retroperitoneal mass lesion again demonstrated. PET 12/18/18:  FINDINGS:  HEAD/NECK: Right palatine tonsil intense hypermetabolism, max SUV 18. Multilevel  bilateral cervical adenopathy, with max SUV 12 in a left supraclavicular node. Cerebral evaluation is limited by normal intense activity. CHEST: Solitary hypermetabolic left axillary node, max SUV 11. ABDOMEN/PELVIS: Bulky retroperitoneal mass max SUV 27, with several additional  small active abdomino-pelvic nodes. Bilateral inguinal nodes with max SUV 12 on  the left. SKELETON: No foci of abnormal hypermetabolism in the axial and visualized  appendicular skeleton. IMPRESSION:   1. Right palatine tonsil tumor involvement (Deauville 5). 2. Bilateral cervical delaney involvement (Deauville 5). 3. Left axillary node involvement (Deauville 5). 4. Bulky retroperitoneal lymphoma mass and additional smaller hypermetabolic  abdomino-pelvic nodes (Deauville 5). 5. Bilateral inguinal delaney involvement (Deauville 5). Deauville Five Point Scale  1. No uptake or no residual uptake (when used interim)  2. Slight uptake, but below blood pool (mediastinum)  3. Uptake above mediastinal, but below/equal to uptake in the liver  4. Uptake slightly to moderately higher than liver  5. Markedly increased uptake or any new lesion (on response evaluation)  Each FDG-avid (or previously FDG avid) lesion is rated independently. Reference values:  Mediastinal blood pool: 2.1 SUV  Liver (background): 2.2 SUV    PET/CT 2/05/19:   IMPRESSION:  1. No Foci of Abnormal Hypermetabolism (Deauville 1). 2. Resolved activity in the right palatine tonsil, bilateral cervical nodes,left axillary node, retroperitoneal/abdominal pelvic adenopathy, bilateral inguinal nodes.     Echo 2/14/19:  Normal cavity size, wall thickness and systolic function (ejection fraction normal). The muscle mass is normal. The cavity shape is normal. The estimated ejection fraction is 41 - 45%. Abnormal wall motion as described on the wall scoring diagram below. End-systolic volume is normal. Normal left ventricular strain. There is mild (grade 1) left ventricular diastolic dysfunction. Normal left ventricular diastolic pressure. End-diastolic volume is normal.    LE arterial duplex 2/22/19:  There is evidence of left groin pseudoaneurysm noted arising from distal common femoral artery, pseudo lobe measures 2.32cm x 2.58cm and pseudo neck length measuring 0.63cm. There is no evidence of hemodynamically significant left lower extremity arterial obstruction. JACLYN is 1.03 on the right and 1.02 on the left. LE arterial duplex s/p Thrombin Injection to Pseudoaneurysm 2/26/19:  Successful thrombin injection procedure of the left groin with no further flow seen. No evidence of hemodnyamically significant obstruction in the left lower extremity. Left lower extremity arterial duplex performed. Confirmed pseudoaneurysm in left groin with small neck. Following thrombin injection, no further flow seen in the pseudoaneurysm. The left common femoral, profunda femoral, femoral, popliteal, posterior tibial and anterior arteries were imaged. Mainly triphasic flow was seen with no evidence of significantly elevated velocities. Repeat LE arterial duplex 2/27/19:  Continued thrombosed left groin pseudoaneurysm following thrombin injection on 02/26/2019. No flow or color fill is identified. The hematoma measures approximately 2.1 x 2.9 cm in diameter. The common femoral, deep femoral, femoral, and popliteal arteries are patent with mainly tri-phasic flow and no significant hemodynamically obstruction is noted.       Assessment & Plan:   Kendall Daley Sr. is a 41 y.o. male comes in for evaluation and management of lymphoma. 1. Follicular lymphoma: Grade 3a.  Although grade 3a disease is considered more indolent and can be treated like grade 1/2 disease, this patient has indications for treatment: bulky disease encircling the aorta causing symptoms. Bone marrow negative for lymphoma, but was hypercellular. BR better than RCHOP, but based on GALLIUM study, Obinutuzumab-based induction and maintenance prolongs PFS over that seen with rituximab-based therapy. Therefore, I have chosen to treat patient with O-CHOP regimen for 6 cycles, followed by possible maintenance Obinutuzumab. We discussed the risks and benefits of O-CHOP chemotherapy. The potential side effects include, but are not limited to: nausea, vomiting, diarrhea, constipation, Flu-like symptoms, infusion reaction, allergic reaction, flushing, taste changes, increased risk of infection, anemia, fatigue, alopecia, neuropathy, nail changes, cardiac damage, mucositis, myleosuppression, infertility and rarely, death. Patient completed chemoteaching and received a chemotherapy education folder. CR after 2 cycles of O-CHOP, but switched regiment Bendamustine-O due to cardiac event. Chemotherapy consent signed and patient educated about side effects including: cytopenias, infections, bleeding, n/v/d, hair loss, skin rash, secondary malignancy, kidney or liver toxicity. He will remain on this regimen every 4 weeks for a total of 4 cycles. -- Proceed with cycle 4 (cycle 6/6 of chemotherapy overall), day 1 Riddhi-O   -- Return in 2 weeks for MD visit, labs and PET scan results (6/6 chemotherapy overall)   -- Repeat PET after cycle 4 - scheduled for June. 2. Use of cardiotoxic chemotherapy: Discussed risks/benefits of using doxorubicin. Echo on 12/17/18 showed EF 65%, but drop to EF 45% immediately after MI. Has seen Dr. Juanito Dunn who will follow up again in 1 month. 3. Neoplasm related pain / Anxiety: Left lower back pain. Taking Oxycodone 5mg bid prn. Signed pain contract on 12/28/18. Counseled on adding SSRI if anxiety becomes worse. -- Continue Oxycodone 5mg only twice a day, 80 tabs refilled 1/24/19  -- Ativan 0.5mg bid taking TID  -- Lexapro 10mg daily for anxiety. -- Follow up with Selvin on 6/4/19.    -- I discussed the subject of cutting down on his opioids as his tumor is shrinking. We will continue to discuss. 4. HTN / Hyperlipidemia: Well controlled today on BB, ACEI. 5. Tobacco abuse: Reiterated strong recommendation for smoking cessation in the setting of recent MI. Discussed cessation strategies and pt does not want Wellbutrin given past experience with it. Doing well on Chantix 1mg bid and has cut down to 6 cigs/day. -- Continue cutting down on cigarrettes, continue Chantix. 6. Dental infection: Caused neutropenic fever and hospital admission at start of treatment. No abscess. Blood cultures negative. Treated with IV antibiotics followed by Augmentin. Followed up with [facility]. Recommend patient wait at least 4 weeks after chemotherapy for counts to recover. Faxed information to [phone] office. -- Continue work up with [facility]. 7. H/o STEMI / Cardiac arrest: P/w CP on 2/14/19 followed by collapse and cardiac arrest. Cardiac cath at [address] [place] [address] by Dr. Floridalma Hinds revealed 90-95% occlusion of proximal LAD, TOM placed. Dr. Floridalma Hinds [phone]. Dr. Aleks Vera recommends: Continue aspirin and Plavix along with high-dose Lipitor and metoprolol. -- 5/31/19 Stress test scheduled. -- 7/29 appointment with Marjorie.   -- On dual antiplatelet therapy aspirin and clopidogrel  -- On BB, ACEI, statin      8. H/o Left groin pseudoaneurysm: resolved. 9. Tremors: Started within the past few weeks, will continue to monitor. Denies dizziness/lightheadedness, chest tyler n or SOB when this occurs. Does not interfere with writing or buttoning clothing. Emotional well being: Pt is coping well with his/her disease and has excellent support. Met with SW in the past as well as today.    I appreciate the opportunity to participate in Mr. Corie Lopez Sr.'s care.     Signed By: Tori Simmonds, MD     May 23, 2019

## 2019-05-23 ENCOUNTER — OFFICE VISIT (OUTPATIENT)
Dept: ONCOLOGY | Age: 44
End: 2019-05-23

## 2019-05-23 ENCOUNTER — HOSPITAL ENCOUNTER (OUTPATIENT)
Dept: INFUSION THERAPY | Age: 44
Discharge: HOME | End: 2019-05-23
Payer: COMMERCIAL

## 2019-05-23 VITALS
HEIGHT: 67 IN | BODY MASS INDEX: 24.12 KG/M2 | DIASTOLIC BLOOD PRESSURE: 69 MMHG | WEIGHT: 153.7 LBS | HEART RATE: 59 BPM | OXYGEN SATURATION: 100 % | TEMPERATURE: 97.9 F | SYSTOLIC BLOOD PRESSURE: 108 MMHG | RESPIRATION RATE: 16 BRPM

## 2019-05-23 VITALS
TEMPERATURE: 97.6 F | HEART RATE: 71 BPM | BODY MASS INDEX: 24.01 KG/M2 | DIASTOLIC BLOOD PRESSURE: 82 MMHG | RESPIRATION RATE: 16 BRPM | WEIGHT: 153 LBS | SYSTOLIC BLOOD PRESSURE: 117 MMHG | HEIGHT: 67 IN | OXYGEN SATURATION: 100 %

## 2019-05-23 DIAGNOSIS — Z51.11 CHEMOTHERAPY MANAGEMENT, ENCOUNTER FOR: ICD-10-CM

## 2019-05-23 DIAGNOSIS — C82.33 FOLLICULAR LYMPHOMA GRADE IIIA OF INTRA-ABDOMINAL LYMPH NODES (HCC): Primary | ICD-10-CM

## 2019-05-23 DIAGNOSIS — C82.90 FOLLICULAR LYMPHOMA, UNSPECIFIED FOLLICULAR LYMPHOMA TYPE, UNSPECIFIED BODY REGION (HCC): Primary | ICD-10-CM

## 2019-05-23 LAB
ALBUMIN SERPL-MCNC: 3.7 G/DL (ref 3.5–5)
ALBUMIN/GLOB SERPL: 1.3 {RATIO} (ref 1.1–2.2)
ALP SERPL-CCNC: 148 U/L (ref 45–117)
ALT SERPL-CCNC: 82 U/L (ref 12–78)
ANION GAP SERPL CALC-SCNC: 5 MMOL/L (ref 5–15)
AST SERPL-CCNC: 33 U/L (ref 15–37)
BASOPHILS # BLD: 0.1 K/UL (ref 0–0.1)
BASOPHILS NFR BLD: 2 % (ref 0–1)
BILIRUB SERPL-MCNC: 0.3 MG/DL (ref 0.2–1)
BUN SERPL-MCNC: 13 MG/DL (ref 6–20)
BUN/CREAT SERPL: 13 (ref 12–20)
CALCIUM SERPL-MCNC: 9.2 MG/DL (ref 8.5–10.1)
CHLORIDE SERPL-SCNC: 108 MMOL/L (ref 97–108)
CO2 SERPL-SCNC: 27 MMOL/L (ref 21–32)
CREAT SERPL-MCNC: 1.03 MG/DL (ref 0.7–1.3)
DIFFERENTIAL METHOD BLD: ABNORMAL
EOSINOPHIL # BLD: 1 K/UL (ref 0–0.4)
EOSINOPHIL NFR BLD: 20 % (ref 0–7)
ERYTHROCYTE [DISTWIDTH] IN BLOOD BY AUTOMATED COUNT: 13.2 % (ref 11.5–14.5)
GLOBULIN SER CALC-MCNC: 2.9 G/DL (ref 2–4)
GLUCOSE SERPL-MCNC: 116 MG/DL (ref 65–100)
HCT VFR BLD AUTO: 43.5 % (ref 36.6–50.3)
HGB BLD-MCNC: 14.6 G/DL (ref 12.1–17)
IMM GRANULOCYTES # BLD AUTO: 0 K/UL (ref 0–0.04)
IMM GRANULOCYTES NFR BLD AUTO: 0 % (ref 0–0.5)
LYMPHOCYTES # BLD: 0.8 K/UL (ref 0.8–3.5)
LYMPHOCYTES NFR BLD: 16 % (ref 12–49)
MCH RBC QN AUTO: 31.7 PG (ref 26–34)
MCHC RBC AUTO-ENTMCNC: 33.6 G/DL (ref 30–36.5)
MCV RBC AUTO: 94.6 FL (ref 80–99)
MONOCYTES # BLD: 0.7 K/UL (ref 0–1)
MONOCYTES NFR BLD: 15 % (ref 5–13)
NEUTS SEG # BLD: 2.2 K/UL (ref 1.8–8)
NEUTS SEG NFR BLD: 47 % (ref 32–75)
NRBC # BLD: 0 K/UL (ref 0–0.01)
NRBC BLD-RTO: 0 PER 100 WBC
PLATELET # BLD AUTO: 180 K/UL (ref 150–400)
PMV BLD AUTO: 11.1 FL (ref 8.9–12.9)
POTASSIUM SERPL-SCNC: 3.7 MMOL/L (ref 3.5–5.1)
PROT SERPL-MCNC: 6.6 G/DL (ref 6.4–8.2)
RBC # BLD AUTO: 4.6 M/UL (ref 4.1–5.7)
RBC MORPH BLD: ABNORMAL
SODIUM SERPL-SCNC: 140 MMOL/L (ref 136–145)
WBC # BLD AUTO: 4.8 K/UL (ref 4.1–11.1)

## 2019-05-23 PROCEDURE — 74011250636 HC RX REV CODE- 250/636: Performed by: NURSE PRACTITIONER

## 2019-05-23 PROCEDURE — 36415 COLL VENOUS BLD VENIPUNCTURE: CPT

## 2019-05-23 PROCEDURE — 74011000258 HC RX REV CODE- 258: Performed by: NURSE PRACTITIONER

## 2019-05-23 PROCEDURE — 85025 COMPLETE CBC W/AUTO DIFF WBC: CPT

## 2019-05-23 PROCEDURE — 74011250637 HC RX REV CODE- 250/637: Performed by: NURSE PRACTITIONER

## 2019-05-23 PROCEDURE — 80053 COMPREHEN METABOLIC PANEL: CPT

## 2019-05-23 PROCEDURE — 77030012965 HC NDL HUBR BBMI -A

## 2019-05-23 PROCEDURE — 96415 CHEMO IV INFUSION ADDL HR: CPT

## 2019-05-23 PROCEDURE — 96413 CHEMO IV INFUSION 1 HR: CPT

## 2019-05-23 PROCEDURE — 96375 TX/PRO/DX INJ NEW DRUG ADDON: CPT

## 2019-05-23 RX ORDER — SODIUM CHLORIDE 9 MG/ML
10 INJECTION INTRAMUSCULAR; INTRAVENOUS; SUBCUTANEOUS AS NEEDED
Status: ACTIVE | OUTPATIENT
Start: 2019-05-23 | End: 2019-05-23

## 2019-05-23 RX ORDER — DIPHENHYDRAMINE HYDROCHLORIDE 50 MG/ML
50 INJECTION, SOLUTION INTRAMUSCULAR; INTRAVENOUS ONCE
Status: COMPLETED | OUTPATIENT
Start: 2019-05-23 | End: 2019-05-23

## 2019-05-23 RX ORDER — SODIUM CHLORIDE 9 MG/ML
10 INJECTION INTRAMUSCULAR; INTRAVENOUS; SUBCUTANEOUS AS NEEDED
Status: CANCELLED | OUTPATIENT
Start: 2019-06-13

## 2019-05-23 RX ORDER — ONDANSETRON 2 MG/ML
8 INJECTION INTRAMUSCULAR; INTRAVENOUS ONCE
Status: COMPLETED | OUTPATIENT
Start: 2019-05-23 | End: 2019-05-23

## 2019-05-23 RX ORDER — SODIUM CHLORIDE 0.9 % (FLUSH) 0.9 %
5-10 SYRINGE (ML) INJECTION AS NEEDED
Status: CANCELLED | OUTPATIENT
Start: 2019-06-13

## 2019-05-23 RX ORDER — ACETAMINOPHEN 325 MG/1
650 TABLET ORAL ONCE
Status: COMPLETED | OUTPATIENT
Start: 2019-05-23 | End: 2019-05-23

## 2019-05-23 RX ORDER — HEPARIN 100 UNIT/ML
300-500 SYRINGE INTRAVENOUS AS NEEDED
Status: ACTIVE | OUTPATIENT
Start: 2019-05-23 | End: 2019-05-23

## 2019-05-23 RX ORDER — SODIUM CHLORIDE 0.9 % (FLUSH) 0.9 %
10 SYRINGE (ML) INJECTION AS NEEDED
Status: ACTIVE | OUTPATIENT
Start: 2019-05-23 | End: 2019-05-23

## 2019-05-23 RX ORDER — HEPARIN 100 UNIT/ML
500 SYRINGE INTRAVENOUS AS NEEDED
Status: CANCELLED | OUTPATIENT
Start: 2019-06-13

## 2019-05-23 RX ADMIN — Medication 500 UNITS: at 17:03

## 2019-05-23 RX ADMIN — DIPHENHYDRAMINE HYDROCHLORIDE 50 MG: 50 INJECTION INTRAMUSCULAR; INTRAVENOUS at 11:46

## 2019-05-23 RX ADMIN — Medication 10 ML: at 10:34

## 2019-05-23 RX ADMIN — ACETAMINOPHEN 650 MG: 325 TABLET ORAL at 10:30

## 2019-05-23 RX ADMIN — Medication 10 ML: at 10:35

## 2019-05-23 RX ADMIN — OBINUTUZUMAB 1000 MG: 1000 INJECTION, SOLUTION, CONCENTRATE INTRAVENOUS at 12:20

## 2019-05-23 RX ADMIN — SODIUM CHLORIDE 10 ML: 9 INJECTION INTRAMUSCULAR; INTRAVENOUS; SUBCUTANEOUS at 10:35

## 2019-05-23 RX ADMIN — ONDANSETRON 8 MG: 2 INJECTION, SOLUTION INTRAMUSCULAR; INTRAVENOUS at 15:56

## 2019-05-23 RX ADMIN — DEXAMETHASONE SODIUM PHOSPHATE 12 MG: 4 INJECTION, SOLUTION INTRA-ARTICULAR; INTRALESIONAL; INTRAMUSCULAR; INTRAVENOUS; SOFT TISSUE at 16:00

## 2019-05-23 RX ADMIN — Medication 10 ML: at 17:03

## 2019-05-23 RX ADMIN — BENDAMUSTINE HYDROCHLORIDE 162.5 MG: 25 INJECTION, SOLUTION INTRAVENOUS at 16:50

## 2019-05-23 NOTE — PROGRESS NOTES
[facility] - Chemotherapy Progress Note    2732 Pt admit to [facility] for Gazyva/Bendamustine ambulatory in stable condition. Assessment completed. No new concerns voiced. PAC with positive blood return. Chemotherapy Flowsheet 5/23/2019   Cycle C4D1   Date 5/23/2019   Drug / Regimen O-Benamustine   Pre Meds -   Notes -       Patient Vitals for the past 12 hrs:   Temp Pulse Resp BP SpO2   05/23/19 1650 97.9 °F (36.6 °C) (!) 59 16 108/69 --   05/23/19 1550 -- 60 16 101/64 --   05/23/19 1520 -- (!) 56 16 101/62 --   05/23/19 1450 -- (!) 55 16 98/63 --   05/23/19 1420 -- 61 16 104/67 --   05/23/19 1350 -- (!) 58 16 103/65 --   05/23/19 1320 -- (!) 56 16 103/67 --   05/23/19 1250 -- -- 16 105/66 --   05/23/19 0911 97.6 °F (36.4 °C) 71 18 117/82 100 %       Medications:  Tylenol PO  Benadryl IVP  Gazyva IV  Decadron IVP  Zofran IV  Bendamustine IVP    1705 Pt tolerated treatment well. PAC maintained positive blood return throughout treatment, flushed with positive blood return at conclusion. D/c home ambulatory in no distress. Pt aware of next appointment scheduled for 5/24 at 09. Pt declined copy of AVS.     Recent Results (from the past 12 hour(s))   CBC WITH AUTOMATED DIFF    Collection Time: 05/23/19  9:22 AM   Result Value Ref Range    WBC 4.8 4.1 - 11.1 K/uL    RBC 4.60 4.10 - 5.70 M/uL    HGB 14.6 12.1 - 17.0 g/dL    HCT 43.5 36.6 - 50.3 %    MCV 94.6 80.0 - 99.0 FL    MCH 31.7 26.0 - 34.0 PG    MCHC 33.6 30.0 - 36.5 g/dL    RDW 13.2 11.5 - 14.5 %    PLATELET 166 645 - 092 K/uL    MPV 11.1 8.9 - 12.9 FL    NRBC 0.0 0  WBC    ABSOLUTE NRBC 0.00 0.00 - 0.01 K/uL    NEUTROPHILS 47 32 - 75 %    LYMPHOCYTES 16 12 - 49 %    MONOCYTES 15 (H) 5 - 13 %    EOSINOPHILS 20 (H) 0 - 7 %    BASOPHILS 2 (H) 0 - 1 %    IMMATURE GRANULOCYTES 0 0.0 - 0.5 %    ABS. NEUTROPHILS 2.2 1.8 - 8.0 K/UL    ABS. LYMPHOCYTES 0.8 0.8 - 3.5 K/UL    ABS. MONOCYTES 0.7 0.0 - 1.0 K/UL    ABS.  EOSINOPHILS 1.0 (H) 0.0 - 0.4 K/UL ABS. BASOPHILS 0.1 0.0 - 0.1 K/UL    ABS. IMM. GRANS. 0.0 0.00 - 0.04 K/UL    DF SMEAR SCANNED      RBC COMMENTS NORMOCYTIC, NORMOCHROMIC     METABOLIC PANEL, COMPREHENSIVE    Collection Time: 05/23/19  9:22 AM   Result Value Ref Range    Sodium 140 136 - 145 mmol/L    Potassium 3.7 3.5 - 5.1 mmol/L    Chloride 108 97 - 108 mmol/L    CO2 27 21 - 32 mmol/L    Anion gap 5 5 - 15 mmol/L    Glucose 116 (H) 65 - 100 mg/dL    BUN 13 6 - 20 MG/DL    Creatinine 1.03 0.70 - 1.30 MG/DL    BUN/Creatinine ratio 13 12 - 20      GFR est AA >60 >60 ml/min/1.73m2    GFR est non-AA >60 >60 ml/min/1.73m2    Calcium 9.2 8.5 - 10.1 MG/DL    Bilirubin, total 0.3 0.2 - 1.0 MG/DL    ALT (SGPT) 82 (H) 12 - 78 U/L    AST (SGOT) 33 15 - 37 U/L    Alk.  phosphatase 148 (H) 45 - 117 U/L    Protein, total 6.6 6.4 - 8.2 g/dL    Albumin 3.7 3.5 - 5.0 g/dL    Globulin 2.9 2.0 - 4.0 g/dL    A-G Ratio 1.3 1.1 - 2.2

## 2019-05-24 ENCOUNTER — HOSPITAL ENCOUNTER (OUTPATIENT)
Dept: INFUSION THERAPY | Age: 44
Discharge: HOME | End: 2019-05-24
Payer: COMMERCIAL

## 2019-05-24 VITALS
DIASTOLIC BLOOD PRESSURE: 77 MMHG | BODY MASS INDEX: 24.22 KG/M2 | TEMPERATURE: 96.6 F | HEIGHT: 67 IN | SYSTOLIC BLOOD PRESSURE: 115 MMHG | HEART RATE: 69 BPM | WEIGHT: 154.3 LBS | RESPIRATION RATE: 16 BRPM

## 2019-05-24 DIAGNOSIS — C82.90 FOLLICULAR LYMPHOMA, UNSPECIFIED FOLLICULAR LYMPHOMA TYPE, UNSPECIFIED BODY REGION (HCC): Primary | ICD-10-CM

## 2019-05-24 PROCEDURE — 74011000250 HC RX REV CODE- 250: Performed by: NURSE PRACTITIONER

## 2019-05-24 PROCEDURE — 74011250636 HC RX REV CODE- 250/636: Performed by: NURSE PRACTITIONER

## 2019-05-24 PROCEDURE — 77030012965 HC NDL HUBR BBMI -A

## 2019-05-24 PROCEDURE — 74011000258 HC RX REV CODE- 258: Performed by: NURSE PRACTITIONER

## 2019-05-24 PROCEDURE — 96375 TX/PRO/DX INJ NEW DRUG ADDON: CPT

## 2019-05-24 PROCEDURE — 96409 CHEMO IV PUSH SNGL DRUG: CPT

## 2019-05-24 PROCEDURE — 96377 APPLICATON ON-BODY INJECTOR: CPT

## 2019-05-24 RX ORDER — HEPARIN 100 UNIT/ML
300-500 SYRINGE INTRAVENOUS AS NEEDED
Status: ACTIVE | OUTPATIENT
Start: 2019-05-24 | End: 2019-05-24

## 2019-05-24 RX ORDER — SODIUM CHLORIDE 9 MG/ML
10 INJECTION INTRAMUSCULAR; INTRAVENOUS; SUBCUTANEOUS AS NEEDED
Status: ACTIVE | OUTPATIENT
Start: 2019-05-24 | End: 2019-05-24

## 2019-05-24 RX ORDER — ONDANSETRON 2 MG/ML
8 INJECTION INTRAMUSCULAR; INTRAVENOUS ONCE
Status: COMPLETED | OUTPATIENT
Start: 2019-05-24 | End: 2019-05-24

## 2019-05-24 RX ORDER — SODIUM CHLORIDE 0.9 % (FLUSH) 0.9 %
10 SYRINGE (ML) INJECTION AS NEEDED
Status: ACTIVE | OUTPATIENT
Start: 2019-05-24 | End: 2019-05-24

## 2019-05-24 RX ADMIN — DEXAMETHASONE SODIUM PHOSPHATE 12 MG: 4 INJECTION, SOLUTION INTRA-ARTICULAR; INTRALESIONAL; INTRAMUSCULAR; INTRAVENOUS; SOFT TISSUE at 09:25

## 2019-05-24 RX ADMIN — Medication 10 ML: at 10:07

## 2019-05-24 RX ADMIN — Medication 500 UNITS: at 10:07

## 2019-05-24 RX ADMIN — BENDAMUSTINE HYDROCHLORIDE 162.5 MG: 25 INJECTION, SOLUTION INTRAVENOUS at 09:55

## 2019-05-24 RX ADMIN — PEGFILGRASTIM 6 MG: KIT SUBCUTANEOUS at 10:05

## 2019-05-24 RX ADMIN — SODIUM CHLORIDE 10 ML: 9 INJECTION, SOLUTION INTRAMUSCULAR; INTRAVENOUS; SUBCUTANEOUS at 08:58

## 2019-05-24 RX ADMIN — ONDANSETRON 8 MG: 2 INJECTION, SOLUTION INTRAMUSCULAR; INTRAVENOUS at 09:18

## 2019-05-24 NOTE — PROGRESS NOTES
[facility] VISIT NOTE    12. Pt arrived at [facility] ambulatory and in no distress for C4D2 Gadzyva/Bendamustine. Assessment completed, pt with no new complaints or concerns. RCW chest port accessed with . 75 in joshi with no difficulty. Positive blood return noted. No labs to be drawn today. Medications received:  NS KVO  Zofran IV  Dexamethasone IV  Bendamustine IV    Tolerated treatment well, no adverse reaction noted. Port de-accessed and flushed per protocol. Positive blood return noted. Patient Vitals for the past 12 hrs:   Temp Pulse Resp BP   05/24/19 1004 96.6 °F (35.9 °C) 69 16 115/77   05/24/19 0851 96.7 °F (35.9 °C) 74 16 120/82     1010. D/C'd from [facility] ambulatory and in no distress. Next appointment to be determined by MD office.

## 2019-06-04 ENCOUNTER — OFFICE VISIT (OUTPATIENT)
Dept: PALLATIVE CARE | Age: 44
End: 2019-06-04

## 2019-06-04 ENCOUNTER — TELEPHONE (OUTPATIENT)
Dept: CARDIOLOGY CLINIC | Age: 44
End: 2019-06-04

## 2019-06-04 VITALS
SYSTOLIC BLOOD PRESSURE: 125 MMHG | HEIGHT: 67 IN | TEMPERATURE: 96.9 F | DIASTOLIC BLOOD PRESSURE: 85 MMHG | BODY MASS INDEX: 23.98 KG/M2 | HEART RATE: 71 BPM | WEIGHT: 152.8 LBS | OXYGEN SATURATION: 97 %

## 2019-06-04 DIAGNOSIS — M54.50 CHRONIC LEFT-SIDED LOW BACK PAIN WITHOUT SCIATICA: Primary | ICD-10-CM

## 2019-06-04 DIAGNOSIS — C82.33 FOLLICULAR LYMPHOMA GRADE IIIA OF INTRA-ABDOMINAL LYMPH NODES (HCC): ICD-10-CM

## 2019-06-04 DIAGNOSIS — R53.83 OTHER FATIGUE: ICD-10-CM

## 2019-06-04 DIAGNOSIS — F41.9 ANXIETY: ICD-10-CM

## 2019-06-04 DIAGNOSIS — F17.200 SMOKING: ICD-10-CM

## 2019-06-04 DIAGNOSIS — G89.29 CHRONIC LEFT-SIDED LOW BACK PAIN WITHOUT SCIATICA: Primary | ICD-10-CM

## 2019-06-04 RX ORDER — OXYCODONE HYDROCHLORIDE 5 MG/1
5 TABLET ORAL
COMMUNITY
End: 2019-06-20 | Stop reason: SDUPTHER

## 2019-06-04 NOTE — TELEPHONE ENCOUNTER
Call placed to discuss stress results with pt per VO of Dr. Renetta Edward. Advised of normal results. Continue current medication regimen. Follow up as instructed. Pt expressed understanding.

## 2019-06-04 NOTE — PROGRESS NOTES
Palliative Medicine Office Visit  Palliative Medicine Nurse Check In  [phone]    Patient Name: Rivka Tee. YOB: 1975      Date of Office Visit:  6/4/2019   Check in time: 10:30- 10:37    Patient states:     From Check In Sheet (scanned in Media):  Has a medical provider talked with you about cardiopulmonary resuscitation (CPR)? [x] Yes   [] No   [] Unable to obtain    Nurse reminder to complete or update ACP FlowSheet:    Is ACP on the Problem List?    [] Yes    [x] No  IF ACP Document is ON FILE; Nurse to place ACP on Problem List     Is there an ACP Note in Chart Review/Note? [] Yes    [x] No   If NO: [address] Planning 5/24/2019   Patient's Healthcare Decision Maker is: Legal Next of Kin   Confirm Advance Directive None   Patient Would Like to Complete Advance Directive No   Does the patient have other document types -       Primary Decision Maker: Yasmeen Mague - Father - [phone]    Secondary Decision Maker: Sherrill Mosquera - Other Relative - [phone]  Advance Care Planning 6/4/2019   Patient's [place] is: Verbal statement (Legal Next of Kin remains as decision maker)   Confirm Advance Directive None   Patient Would Like to Complete Advance Directive No   Does the patient have other document types -         Is there anything that we should know about you as a person in order to provide you the best care possible? Have you been to the ER, urgent care clinic since your last visit? [] Yes   [x] No   [] Unable to obtain    Have you been hospitalized since your last visit? [] Yes   [x] No   [] Unable to obtain    Have you seen or consulted any other health care providers outside of the [address] [place] [address] since your last visit?    [] Yes   [x] No   [] Unable to obtain    Functional status (describe):     Last BM:  6/4/2019       accessed (date): 6/3/19    Bottle review (for opioid pain medication):  Medication 1: Brought in 10 tabs oxycodone from Rx filled on 5/10/19  reflects last filled 5/29/19 120 tabs.   Date filled:   Directions:   # filled:   # left:   # pills taking per day:4-5  Last dose taken:last night    Medication 2:   Date filled:   Directions:   # filled:   # left:   # pills taking per day:  Last dose taken:    Medication 3:   Date filled:   Directions:   # filled:   # left:   # pills taking per day:  Last dose taken:    Medication 4:   Date filled:   Directions:   # filled:   # left:   # pills taking per day:  Last dose taken:

## 2019-06-04 NOTE — PATIENT INSTRUCTIONS
Dear Rivka Tee. ,    It was a pleasure seeing you today in [facility]. We will see you again in 3 to 4 weeks. Your stated goal: continue treatment for your lymphoma with control of your anxiety and pain    Your described symptoms were: Fatigue: 3 Drowsiness: 5   Depression: 0 Pain: 7   Anxiety: 3 Nausea: 0   Anorexia: 0 Dyspnea: 0   Best Well-Being: 3 Constipation: No   Other Problem (Comment): 0       This is the plan we talked about:    1. Anxiety  -Continue escitalopram (Lexapro) 10-mg daily  -Continue alprazolam 1-mg three times daily as needed.   -Today you met our clinical /counselor, Magy Patiño, who is available for supportive counseling. You're going to think about this    2. Pain   -Continue oxycodone 5-mg: take 1 to 2 tabs every 4 hours as needed. 3. Fatigue  -You're sleeping well at night  -Your energy is lower than it was before your cancer diagnosis but you have the energy to do the things you want to do    4. Nausea  -This has not been a problem for you recently    5. Chest pressure  -You had a stress test on 5/31/19 which was normal    6. Bowels  -You're moving your bowels regularly    7. Smoking  -You're now taking Chantix  -Congratulations! You're down to 5 cigarettes a day!  -We talked today about having you cut down by one cigarette over the next month      8.  Lymphoma  -You're getting a PET-CT scan tomorrow  -You will follow up afterwards with Dr. Rufina Lentz       This is what you have shared with us about Advance Care Planning:      Primary Decision Maker: Yasmeen Bowser - Father - [phone]    Secondary Decision Maker: Sherrlil Mosquera - Other Relative - [phone]  [] Named in a scanned document   [x] Legal Next of Kin  [] Guardian    ACP documents you current have include:  [] Advance Directive or Living Will  [] Durable Do Not Resuscitate  [] Physician Orders for Scope of Treatment (POST)  [] Medical Power of   [] Other      The Palliative Medicine Team is here to support you and your family.          Sincerely,      Benjamin Culver MD and the Palliative Medicine Team

## 2019-06-04 NOTE — PROGRESS NOTES
[address] Work  [phone]    Narrative  Ida Vivas is a 41 y.o. male being seen today by Dr. Claire León for symptom management related to high-grade follicular lymphoma. This writer introduced herself to patient before his appointment and explained the role of this SW on the PM team. Patient has a history of anxiety and writer offered patient counseling services to address the anxiety. Patient reported he would consider. Worked on exploring anxiety and building therapeutic rapport. Assessment / Actions  Patient was alert and oriented x4. Patient's mood was anxious and affect congruent. Patient's insight and judgment were fair. Patient's thought process and speech were logical and clear. Plan  Will continue to follow up with patient when he attends clinic to offer support and counseling.          Anderson Hobson, [facility]   Palliative Medicine,   [phone]

## 2019-06-04 NOTE — PROGRESS NOTES
Palliative Medicine Outpatient Services  [place]: [phone] ([phone])    Patient Name: Hoda Vernon. YOB: 1975    Date of Current Visit: 06/04/19  Location of Current Visit:    [] [facility] Office  [x] [facility] Office  [] [address] [place] [address]  [] Home  [] Other:      Date of Initial Visit: 2/19/19   Referral from: Christy Mendes MD  Primary Care Physician: None      SUMMARY:   Hoda Hodges is a 41y.o. year old with high-grade follicular lymphoma, who was referred to Palliative Medicine by Dr. Ana Tolliver for symptom management and supportive care. He was diagnosed in 11/2018 after presenting with back pain. He is currently receiving systemic chemotherapy. He suffered cardiac arrest during cycle #3 due to previously undiagnosed severe stenosis of the LAD s/p PTCA and stent. He has since resumed chemotherapy. The patients social history includes: he is single. He lives with his father in [place]. He has 3 teenage children who live with their mother and with whom he has close contact. 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 He's applied for disability. Palliative Medicine is addressing the following current patient/family concerns: anxiety, depression, left mid- and low back pain related to malignancy, poor appetite, fatigue, advanced care planning. Initial Referral Intake note from Sarah Curiel RN reviewed prior to visit   PALLIATIVE DIAGNOSES:       ICD-10-CM ICD-9-CM    1. Chronic left-sided low back pain without sciatica M54.5 724.2     G89.29 338.29    2. Anxiety F41.9 300.00    3. Other fatigue R53.83 780.79    4. Smoking F17.200 305.1    5. Follicular lymphoma grade IIIa of intra-abdominal lymph nodes [facility]) C82.33 202.03           PLAN:   Patient Instructions     Dear Hoda Vernon. ,    It was a pleasure seeing you today in [facility]. We will see you again in 3 to 4 weeks.     Your stated goal: continue treatment for your lymphoma with control of your anxiety and pain    Your described symptoms were: Fatigue: 3 Drowsiness: 5   Depression: 0 Pain: 7   Anxiety: 3 Nausea: 0   Anorexia: 0 Dyspnea: 0   Best Well-Being: 3 Constipation: No   Other Problem (Comment): 0       This is the plan we talked about:    1. Anxiety  -Continue escitalopram (Lexapro) 10-mg daily  -Continue alprazolam 1-mg three times daily as needed.   -Today you met our clinical /counselor, Yesenia Bejarano, who is available for supportive counseling. You're going to think about this    2. Pain   -Continue oxycodone 5-mg: take 1 to 2 tabs every 4 hours as needed. 3. Fatigue  -You're sleeping well at night  -Your energy is lower than it was before your cancer diagnosis but you have the energy to do the things you want to do    4. Nausea  -This has not been a problem for you recently    5. Chest pressure  -You had a stress test on 5/31/19 which was normal    6. Bowels  -You're moving your bowels regularly    7. Smoking  -You're now taking Chantix  -Congratulations! You're down to 5 cigarettes a day!  -We talked today about having you cut down by one cigarette over the next month      8. Lymphoma  -You're getting a PET-CT scan tomorrow  -You will follow up afterwards with Dr. Trinidad Books       This is what you have shared with us about Advance Care Planning:      Primary Decision Maker: Jacque Bartholomew - Father - [phone]    Secondary Decision Maker: Sherrill Mosquera - Other Relative - [phone]  [] Named in a scanned document   [x] Legal Next of Kin  [] Guardian    ACP documents you current have include:  [] Advance Directive or Living Will  [] Durable Do Not Resuscitate  [] Physician Orders for Scope of Treatment (POST)  [] Medical Power of   [] Other      The Palliative Medicine Team is here to support you and your family.          Sincerely,      Elinor Morrison MD and the Palliative Medicine Team      Counseling and Coordination: note routed to care team       GOALS OF CARE / TREATMENT PREFERENCES:   [====Goals of Care====]  GOALS OF CARE:  Patient / health care proxy stated goals: See Patient Instructions / Summary    TREATMENT PREFERENCES:   Code Status:  [x] Attempt Resuscitation       [] Do Not Attempt Resuscitation    Advance Care Planning:  [x] The Pall Med Interdisciplinary Team has updated the ACP Navigator with Decision Maker and Patient Capacity      Primary Decision Maker: Cristina Davis - Father - [phone]    Secondary Decision Maker: Sherrill Mosquera - Other Relative - [phone]  [] Named in a scanned document   [x] Legal Next of Kin  [] Guardian    Other:  (If patient appropriate for POST, consider using PALLPOST smart phrase here)    The palliative care team has discussed with patient / health care proxy about goals of care / treatment preferences for patient.  [====Goals of Care====]     PRESCRIPTIONS GIVEN:     No orders of the defined types were placed in this encounter. FOLLOW UP:     Future Appointments   Date Time Provider Dahlia Epps   6/5/2019  9:00 AM [facility] PET INJ 1 [facility]   6/5/2019 10:00 AM [facility] PET 1 [facility]   6/13/2019  9:00 AM Janusz Bonds MD [facility] SCHED   6/13/2019 10:00 AM SS INF7 CH3 <1H R[facility]   7/29/2019  2:40 PM Bobby Amador MD [address] [place] [address] IN CARE:   Patient Care Team:  None as PCP - Annette Nugent MD (Hematology and Oncology)  Silvina Ceja MD as Physician (Palliative Medicine)  iSlvina Ceja MD as Physician (Palliative Medicine)       HISTORY:   Reviewed patient-completed ESAS and advance care planning form. Reviewed patient record in prescription monitoring program.    CHIEF COMPLAINT:   Chief Complaint   Patient presents with    Back Pain       HPI/SUBJECTIVE:    The patient is: [x] Verbal / [] Nonverbal     He's had some pain in his joint, his ankles, knees, and elbows, this is new, he's not sure why.  His back pain is OK with the pain medicine. He's having more back pain this morning because he didn't take any pain medicine. He doesn't take it if he needs to drive. He gets a scan tomorrow and he's anxious about that. Otherwise, his anxiety is OK with the Xanax. He's getting out some. He's going for walks almost every day. He sleeps well at night. His appetite is good. His weight hasn't changed. He's now down to 5 cigarettes a day (from 1.5 packs a day). He's having trouble getting lower than that, some because he's so used to smoking in the morning when he gets up and before he goes to bed and after each meal.    He hasn't had any chest pressure lately. He had a stress test last week (normal). He's moving his bowels without taking stool softeners or laxatives.       Clinical Pain Assessment (nonverbal scale for nonverbal patients):   [++++ Clinical Pain Assessment++++]  [++++Pain Severity++++]: Pain: 7  [++++Pain Character++++]: stabbing pain in back, groin feels like a pulled muscle  [++++Pain Duration++++]: months for back pain, weeks for groin pain  [++++Pain Effect++++]: little  [++++Pain Factors++++]: oxycodone helps with back pain, groin pain elicited by standing and walking  [++++Pain Frequency++++]: constant back pain with varying intensity, intermittent groin pain  [++++Pain Location++++]: left lower back, left groin and leg  [++++ Clinical Pain Assessment++++]       FUNCTIONAL ASSESSMENT:     Palliative Performance Scale (PPS):  PPS: 80       PSYCHOSOCIAL/SPIRITUAL SCREENING:     Any spiritual / Congregation concerns:  [] Yes /  [x] No    Caregiver Burnout:  [] Yes /  [x] No /  [] No Caregiver Present      Anticipatory grief assessment:   [x] Normal  / [] Maladaptive       ESAS Anxiety: Anxiety: 3    ESAS Depression: Depression: 0       REVIEW OF SYSTEMS:     The following systems were [x] reviewed / [] unable to be reviewed  Systems: constitutional, ears/nose/mouth/throat, respiratory, gastrointestinal, genitourinary, musculoskeletal, integumentary, neurologic, psychiatric, endocrine. Positive findings noted below. Modified ESAS Completed by: provider   Fatigue: 3 Drowsiness: 5   Depression: 0 Pain: 7   Anxiety: 3 Nausea: 0   Anorexia: 0 Dyspnea: 0   Best Well-Being: 3 Constipation: No   Other Problem (Comment): 0          PHYSICAL EXAM:     Wt Readings from Last 3 Encounters:   06/04/19 152 lb 12.8 oz (69.3 kg)   05/31/19 154 lb (69.9 kg)   05/24/19 154 lb 4.8 oz (70 kg)     Blood pressure 125/85, pulse 71, temperature 96.9 °F (36.1 °C), temperature source Oral, height 5' 7\" (1.702 m), weight 152 lb 12.8 oz (69.3 kg), SpO2 97 %. Last bowel movement: See Nursing Note    Constitutional: sitting in chair, appears rested  Eyes: pupils equal, anicteric  ENMT: no nasal discharge, moist mucous membranes  Cardiovascular: regular rhythm, no peripheral edema  Respiratory: breathing not labored, symmetric  Gastrointestinal: soft non-tender, +bowel sounds  Musculoskeletal: no deformity, no tenderness to palpation  Skin: warm, dry  Neurologic: following commands, moving all extremities  Psychiatric: full affect, no hallucinations  Other:       HISTORY:     Past Medical History:   Diagnosis Date    Anxiety     Cancer [facility])     lymphoma Nov 2018 receiving chemo    Chronic pain     lower back- lymphoma    Hyperlipidemia     Hypertension     Lymphadenopathy 11/12/2018      Past Surgical History:   Procedure Laterality Date    HX HEART CATHETERIZATION  02/2019    HX OTHER SURGICAL  02/2019    cardiac stent    IR INJECTION PSEUDOANEURYSM  2/26/2019      Family History   Problem Relation Age of Onset    Hypertension Father     Diabetes Father     Diabetes Mother       History reviewed, no pertinent family history.   Social History     Tobacco Use    Smoking status: Current Every Day Smoker     Packs/day: 1.00     Years: 20.00     Pack years: 20.00    Smokeless tobacco: Never Used   Substance Use Topics    Alcohol use: No     Frequency: Never     No Known Allergies   Current Outpatient Medications   Medication Sig    oxyCODONE IR (ROXICODONE) 5 mg immediate release tablet Take 5 mg by mouth every four (4) hours as needed for Pain.  ALPRAZolam (XANAX) 1 mg tablet Take 1 Tab by mouth three (3) times daily as needed for Anxiety for up to 30 days. Max Daily Amount: 3 mg.  atorvastatin (LIPITOR) 40 mg tablet Take 1 Tab by mouth nightly. Indications: treatment to slow progression of coronary artery disease    varenicline (CHANTIX) 1 mg tablet Take 1 Tab by mouth two (2) times a day for 90 days.  varenicline (CHANTIX STARTER RUDY) 0.5 mg (11)- 1 mg (42) DsPk On Days 1 to 3 take 0.5 mg once daily. Then on Days 4 to 7 take 0.5 mg twice daily. On Day 8 and forward take 1 mg twice daily for 11 weeks.  escitalopram oxalate (LEXAPRO) 10 mg tablet Take 1 Tab by mouth daily.  clopidogrel (PLAVIX) 75 mg tab 1 Tab by Per NG tube route daily.  lisinopril (PRINIVIL, ZESTRIL) 10 mg tablet Take 1 Tab by mouth daily. DO NOT TAKE for 5 days    L. acidoph & paracasei- S therm- Bifido (AUSTIN-Q/RISAQUAD) 8 billion cell cap cap Take 1 Cap by mouth daily.  aspirin delayed-release (ASPIR-81) 81 mg tablet Take 1 Tab by mouth daily.  metoprolol succinate (TOPROL-XL) 25 mg XL tablet Take 1 Tab by mouth daily.  magic mouthwash solution Take 15-30 mL by mouth four (4) times daily. Magic mouth wash   Maalox  Lidocaine 2% viscous   Diphenhydramine oral solution    Swish and spit for mouth pain, ok to swallow for throat pain     Pharmacy to mix equal portions of ingredients to a total volume as indicated in the dispense amount.  prochlorperazine (COMPAZINE) 10 mg tablet Take 1 Tab by mouth every six (6) hours as needed.  senna-docusate (PERICOLACE) 8.6-50 mg per tablet Take 1 Tab by mouth daily.  ondansetron hcl (ZOFRAN) 8 mg tablet Take 1 Tab by mouth every eight (8) hours as needed for Nausea.  (Patient not taking: Reported on 4/29/2019)    naloxone [facility]) 4 mg/actuation nasal spray Use 1 spray intranasally, then discard. Repeat with new spray every 2 min as needed for opioid overdose symptoms, alternating nostrils. (Patient not taking: Reported on 4/29/2019)     No current facility-administered medications for this visit. LAB DATA REVIEWED:     Lab Results   Component Value Date/Time    WBC 4.8 05/23/2019 09:22 AM    HGB 14.6 05/23/2019 09:22 AM    PLATELET 715 26/09/1563 09:22 AM     Lab Results   Component Value Date/Time    Sodium 140 05/23/2019 09:22 AM    Potassium 3.7 05/23/2019 09:22 AM    Chloride 108 05/23/2019 09:22 AM    CO2 27 05/23/2019 09:22 AM    BUN 13 05/23/2019 09:22 AM    Creatinine 1.03 05/23/2019 09:22 AM    Calcium 9.2 05/23/2019 09:22 AM    Magnesium 1.7 01/12/2019 04:05 AM    Phosphorus 2.0 (L) 01/12/2019 04:05 AM      Lab Results   Component Value Date/Time    AST (SGOT) 33 05/23/2019 09:22 AM    Alk.  phosphatase 148 (H) 05/23/2019 09:22 AM    Protein, total 6.6 05/23/2019 09:22 AM    Albumin 3.7 05/23/2019 09:22 AM    Globulin 2.9 05/23/2019 09:22 AM     Lab Results   Component Value Date/Time    INR 1.1 02/22/2019 08:18 PM    Prothrombin time 10.8 02/22/2019 08:18 PM    aPTT 27.8 02/22/2019 08:18 PM      No results found for: IRON, FE, TIBC, IBCT, PSAT, FERR        CONTROLLED SUBSTANCES SAFETY ASSESSMENT (IF ON CONTROLLED SUBSTANCES):     Reviewed opioid safety handout:  [x] Yes   [] No  24 hour opioid dose >150mg morphine equivalent/day:  [] Yes   [x] No  Benzodiazepines:  [x] Yes   [] No  Sleep apnea:  [] Yes   [x] No  Urine Toxicology Testing within last 6 months:  [] Yes   [] No  History of or new aberrant medication taking behaviors:  [] Yes   [] No  Has Narcan been prescribed [] Yes   [x] No          Total time:   Counseling / coordination time:   > 50% counseling / coordination?:

## 2019-06-05 ENCOUNTER — HOSPITAL ENCOUNTER (OUTPATIENT)
Dept: PET IMAGING | Age: 44
Discharge: HOME | End: 2019-06-05
Attending: NURSE PRACTITIONER
Payer: COMMERCIAL

## 2019-06-05 VITALS — BODY MASS INDEX: 23.86 KG/M2 | WEIGHT: 152 LBS | HEIGHT: 67 IN

## 2019-06-05 DIAGNOSIS — C82.33 FOLLICULAR LYMPHOMA GRADE IIIA OF INTRA-ABDOMINAL LYMPH NODES (HCC): ICD-10-CM

## 2019-06-05 PROCEDURE — A9552 F18 FDG: HCPCS

## 2019-06-05 RX ORDER — SODIUM CHLORIDE 0.9 % (FLUSH) 0.9 %
10 SYRINGE (ML) INJECTION
Status: COMPLETED | OUTPATIENT
Start: 2019-06-05 | End: 2019-06-05

## 2019-06-05 RX ADMIN — Medication 10 ML: at 09:05

## 2019-06-11 RX ORDER — SODIUM CHLORIDE 0.9 % (FLUSH) 0.9 %
5-10 SYRINGE (ML) INJECTION AS NEEDED
Status: CANCELLED | OUTPATIENT
Start: 2019-07-25

## 2019-06-11 RX ORDER — HEPARIN 100 UNIT/ML
500 SYRINGE INTRAVENOUS AS NEEDED
Status: CANCELLED | OUTPATIENT
Start: 2019-07-25

## 2019-06-11 RX ORDER — SODIUM CHLORIDE 9 MG/ML
10 INJECTION INTRAMUSCULAR; INTRAVENOUS; SUBCUTANEOUS AS NEEDED
Status: CANCELLED | OUTPATIENT
Start: 2019-07-25

## 2019-06-11 NOTE — PROGRESS NOTES
[id number] [facility] Oncology at [address] [place] [address]  [phone]    Hematology / Oncology Established Visit    Reason for Visit:   Teofilo Burns is a 41 y.o. male who comes in for f/u of lymphoma. Hematology Oncology Treatment History:     Diagnosis: Follicular lymphoma    Stage: IV    Pathology:   11/13/18 right inguinal LN excision: Follicular lymphoma, high-grade (grade 3a of 3). Comment   The delaney architecture is entirely effaced by atypical lymphocytic proliferation with prominent nodular growth pattern. The majority of the atypical lymphocytes are small to medium in size and have irregular nuclear outlines and inconspicuous nucleoli, morphologically consistent with centrocytes. Scattered large lymphocytes with prominent nucleoli, consistent with centroblasts are identified (> 15 per high-power field). Focal increase in mitotic figures is noted. Occasional follicular dendritic cells are seen. By immunohistochemistry, the atypical lymphocytes are positive for CD20, PAX5, CD10, BCL6 and BCL2 (weak, focal) and negative for MUM1. CD3, CD5 and CD43 stain numerous background T lymphocytes. Ki-67 reveals a high proliferation index in the neoplastic follicles (overall 20-58%). Clinical history indicates 14 cm retroperitoneal mass with bilateral inguinal lymphadenopathy. In summary, the combined morphologic and phenotypic findings are diagnostic of a high-grade follicular lymphoma (grade 3a of 3). There is no evidence of diffuse large B-cell lymphoma. Flow cytometry analysis:   Monoclonal B-cell population (47 % of all cells) with mild increase in side and forward scatter properties expressing CD19, CD20, CD23 and CD10 with surface lambda light chain restriction. No phenotypically aberrant T-cell population. Flow cytometry was performed at [facility]     Prior Treatment: Obinutuzumab-CHOP.  Obinutuzumab: 1000 mg weekly on days 1, 8, 15 for cycle 1, then 1000 mg on day 1 q21 days for cycles 2-6, then monotherapy 1000 mg every 21 days for cycle 7, 8 with Cyclophosphamide 750mg/m2, Doxorubicin 50mg/m2, Vincristine 1.4mg/m2 on day 1 and Prednisone 100mg on Days 1-5, every 21 days for a total of 2 cycles completed late 1/2019. Regimen discontinued due to NSTEMI. Obinutuzumab + Bendamustine: 1000 mg Obinutuzumab on day 1 + Bendamustine 90mg/m2 on days 1-2 on a 28-day cycle x 4 cycles     Current Treatment: Surveillance      Oncologic History:  He states he was in his usual state of health in early November 2018 when he developed low back pain and presented to the ER. The pain was described as constant, radiating to the buttocks, without exacerbating or alleviating factors, associated w/ increased urination, no n/v/d, no dysuria, no fever, no significant weight loss at first, but he did later report 15-lb loss over 1 month due to low appetite. Work-up at outside hospital revealed a retroperitoneal mass seen on CT imaging, and he was transferred to [facility] for further work-up. CT there showed a large retroperitoneal mass encircling the aorta with invasion of the left renal hilum and left adrenal gland. There were bilateral inguinal lymph nodes and moderate left hydronephrosis. He was evaluated while at [facility] and was noted to have palpable nodes in his groin for approximately the past 1 month. No testicular mass, anorexia, weight loss, fevers, night sweats, chest pain, shortness of breath, abdominal pain or nausea. He underwent excisional LN biopsy of right inguinal LN. He was told that he had likely lymphoma. He was advised to follow up as outpatient for PET scan, bone marrow biopsy. However, patient states he was lost to f/u. He never received any calls or appointment details. His aunt urged patient to seek care elsewhere and his PCP recommended [facility]. At our first meeting on 12/13/18, he tells me that he was working full time, feeling well until early Nov 2018.  When he developed the left lower back pain, he went to [facility]. At time of diagnosis, he had no cardiac disease aside from HTN, hyperlipidemia. However, he did have an NSTEMI after cycle 2 of O-CHOP. Was likely unrelated to chemotherapy, but opted to switch treatment to Obinutuzumab-Bendamustine. He completed treatment in 5/2019 and had a CR based on PET. History of Present Illness:   Mr. Zapata is a 40 y/o male with HTN who comes in for follow up of Follicular lymphoma. He completed treatment with chemo-immunotherapy in 5/2019. No fevers, chills, night sweats. Reports mild fatigue, but very busy with family life. He started on the chantix dose pack on 3/30/19 and had cut down to 5 cigarettes a day but now smoking 8 per day. Reports ongoing intermittent tremors in hands, but still able to write. Denies dizziness or lightheadedness. Denies recent chest pain. Reports eating and drinking well. He is walking every day. No fevers, chills, sweats, n/v/d, constipation, abdom pain, dysuria, HA, vision changes, rash, CP, SOB. FAMILY HISTORY:  His father has a history of leukemia, currently in remission, [place] [facility] Past Medical History:   Diagnosis Date    Anxiety     Cancer [facility])     lymphoma Nov 2018 receiving chemo    Chronic pain     lower back- lymphoma    Hyperlipidemia     Hypertension     Lymphadenopathy 11/12/2018      Past Surgical History:   Procedure Laterality Date    HX HEART CATHETERIZATION  02/2019    HX OTHER SURGICAL  02/2019    cardiac stent    IR INJECTION PSEUDOANEURYSM  2/26/2019      Social History     Tobacco Use    Smoking status: Current Every Day Smoker     Packs/day: 1.00     Years: 20.00     Pack years: 20.00    Smokeless tobacco: Never Used   Substance Use Topics    Alcohol use: No     Frequency: Never   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     Family History   Problem Relation Age of Onset    Hypertension Father     Diabetes Father     Diabetes Mother    Father had leukemia. Current Outpatient Medications   Medication Sig    oxyCODONE IR (ROXICODONE) 5 mg immediate release tablet Take 5 mg by mouth every four (4) hours as needed for Pain.  ALPRAZolam (XANAX) 1 mg tablet Take 1 Tab by mouth three (3) times daily as needed for Anxiety for up to 30 days. Max Daily Amount: 3 mg.  atorvastatin (LIPITOR) 40 mg tablet Take 1 Tab by mouth nightly. Indications: treatment to slow progression of coronary artery disease    varenicline (CHANTIX) 1 mg tablet Take 1 Tab by mouth two (2) times a day for 90 days.  varenicline (CHANTIX STARTER RUDY) 0.5 mg (11)- 1 mg (42) DsPk On Days 1 to 3 take 0.5 mg once daily. Then on Days 4 to 7 take 0.5 mg twice daily. On Day 8 and forward take 1 mg twice daily for 11 weeks.  escitalopram oxalate (LEXAPRO) 10 mg tablet Take 1 Tab by mouth daily.  clopidogrel (PLAVIX) 75 mg tab 1 Tab by Per NG tube route daily.  metoprolol succinate (TOPROL-XL) 25 mg XL tablet Take 1 Tab by mouth daily.  lisinopril (PRINIVIL, ZESTRIL) 10 mg tablet Take 1 Tab by mouth daily. DO NOT TAKE for 5 days    L. acidoph & paracasei- S therm- Bifido (AUSTIN-Q/RISAQUAD) 8 billion cell cap cap Take 1 Cap by mouth daily.  magic mouthwash solution Take 15-30 mL by mouth four (4) times daily. Magic mouth wash   Maalox  Lidocaine 2% viscous   Diphenhydramine oral solution    Swish and spit for mouth pain, ok to swallow for throat pain     Pharmacy to mix equal portions of ingredients to a total volume as indicated in the dispense amount.  prochlorperazine (COMPAZINE) 10 mg tablet Take 1 Tab by mouth every six (6) hours as needed.  senna-docusate (PERICOLACE) 8.6-50 mg per tablet Take 1 Tab by mouth daily.  ondansetron hcl (ZOFRAN) 8 mg tablet Take 1 Tab by mouth every eight (8) hours as needed for Nausea. (Patient not taking: Reported on 4/29/2019)    naloxone [facility]) 4 mg/actuation nasal spray Use 1 spray intranasally, then discard.  Repeat with new spray every 2 min as needed for opioid overdose symptoms, alternating nostrils. (Patient not taking: Reported on 4/29/2019)    aspirin delayed-release (ASPIR-81) 81 mg tablet Take 1 Tab by mouth daily. No current facility-administered medications for this visit. No Known Allergies     Review of Systems: A complete review of systems was obtained, negative except as described above. Physical Exam:     Visit Vitals  /90   Pulse 78   Temp 97.4 °F (36.3 °C) (Oral)   Resp 16   Ht 5' 7\" (1.702 m)   Wt 154 lb (69.9 kg)   SpO2 100%   BMI 24.12 kg/m²     ECOG PS: 0  General: Well developed, no acute distress, smells like cigarette smoke  Eyes: PERRLA, EOMI, anicteric sclerae  HENT: Atraumatic, OP clear, poor dentition, multiple dental caries, no erythema,TMs intact without erythema  Neck: Supple  Lymphatic: No cervical, supraclavicular, axillary, inguinal lymphadenopathy  Respiratory: CTAB, normal respiratory effort  CV: Normal rate, regular rhythm, no murmurs, no peripheral edema  GI: Soft, nontender, nondistended, no masses, no hepatomegaly, no splenomegaly  MS: Normal gait and station. Digits without clubbing or cyanosis. Skin: No rashes, ecchymoses, or petechiae. Normal temperature, turgor, and texture. Neuro/Psych: Alert, oriented. 5/5 strength in all 4 extremities. Appropriate affect, normal judgment/insight. Results:     Lab Results   Component Value Date/Time    WBC 4.8 05/23/2019 09:22 AM    HGB 14.6 05/23/2019 09:22 AM    HCT 43.5 05/23/2019 09:22 AM    PLATELET 292 28/66/1610 09:22 AM    MCV 94.6 05/23/2019 09:22 AM    ABS.  NEUTROPHILS 2.2 05/23/2019 09:22 AM    Hemoglobin (POC) 15.0 06/05/2009 02:13 PM    Hematocrit (POC) 39 02/14/2019 01:24 PM     Lab Results   Component Value Date/Time    Sodium 140 05/23/2019 09:22 AM    Potassium 3.7 05/23/2019 09:22 AM    Chloride 108 05/23/2019 09:22 AM    CO2 27 05/23/2019 09:22 AM    Glucose 116 (H) 05/23/2019 09:22 AM    BUN 13 05/23/2019 09:22 AM    Creatinine 1.03 2019 09:22 AM    GFR est AA >60 2019 09:22 AM    GFR est non-AA >60 2019 09:22 AM    Calcium 9.2 2019 09:22 AM    Sodium (POC) 136 2019 01:24 PM    Potassium (POC) 3.9 2019 01:24 PM    Chloride (POC) 102 2019 01:24 PM    Glucose (POC) 249 (H) 02/15/2019 10:21 PM    BUN (POC) 14 2019 01:24 PM    Creatinine (POC) 0.9 2019 01:24 PM    Calcium, ionized (POC) 1.24 2019 01:24 PM     Lab Results   Component Value Date/Time    Bilirubin, total 0.3 2019 09:22 AM    ALT (SGPT) 82 (H) 2019 09:22 AM    AST (SGOT) 33 2019 09:22 AM    Alk. phosphatase 148 (H) 2019 09:22 AM    Protein, total 6.6 2019 09:22 AM    Albumin 3.7 2019 09:22 AM    Globulin 2.9 2019 09:22 AM     No results found for: IRON, FE, TIBC, IBCT, PSAT, FERR    No results found for: B12LT, FOL, RBCF  Lab Results   Component Value Date/Time    TSH 1.53 2016 04:40 AM     18:     Lab Results   Component Value Date/Time    Hepatitis A, IgM NONREACTIVE 2018 04:53 PM    Hepatitis B surface Ag <0.10 2018 04:53 PM    Hepatitis B core, IgM NONREACTIVE 2018 04:53 PM         Imagin/9/18 Abd/pelvis CT: IMPRESSION:  1. Interval development of a large retroperitoneal mass encircling the aorta with invasion of the left renal hilum and left adrenal gland. Several adjacent lymph nodes are seen extending into the peritoneum and underneath the  diaphragmatic natalie. This most likely represents lymphoma. 2. Several new bilateral enlarged inguinal lymph nodes also likely representing lymphoma. 3. Moderate left hydronephrosis with a delayed renal nephrogram related to decreased renal function. This is related to the invasion of the renal hilum. 18 Chest CT: IMPRESSION:  Trace left pleural effusion. Bilateral lower lobe atelectasis. Large  retroperitoneal mass lesion again demonstrated.     PET 12/18/18:  FINDINGS:  HEAD/NECK: Right palatine tonsil intense hypermetabolism, max SUV 18. Multilevel  bilateral cervical adenopathy, with max SUV 12 in a left supraclavicular node. Cerebral evaluation is limited by normal intense activity. CHEST: Solitary hypermetabolic left axillary node, max SUV 11. ABDOMEN/PELVIS: Bulky retroperitoneal mass max SUV 27, with several additional  small active abdomino-pelvic nodes. Bilateral inguinal nodes with max SUV 12 on  the left. SKELETON: No foci of abnormal hypermetabolism in the axial and visualized  appendicular skeleton. IMPRESSION:   1. Right palatine tonsil tumor involvement (Deauville 5). 2. Bilateral cervical delaney involvement (Deauville 5). 3. Left axillary node involvement (Deauville 5). 4. Bulky retroperitoneal lymphoma mass and additional smaller hypermetabolic  abdomino-pelvic nodes (Deauville 5). 5. Bilateral inguinal delaney involvement (Deauville 5). Deauville Five Point Scale  1. No uptake or no residual uptake (when used interim)  2. Slight uptake, but below blood pool (mediastinum)  3. Uptake above mediastinal, but below/equal to uptake in the liver  4. Uptake slightly to moderately higher than liver  5. Markedly increased uptake or any new lesion (on response evaluation)  Each FDG-avid (or previously FDG avid) lesion is rated independently. Reference values:  Mediastinal blood pool: 2.1 SUV  Liver (background): 2.2 SUV    PET/CT 2/05/19:   IMPRESSION:  1. No Foci of Abnormal Hypermetabolism (Deauville 1). 2. Resolved activity in the right palatine tonsil, bilateral cervical nodes,left axillary node, retroperitoneal/abdominal pelvic adenopathy, bilateral inguinal nodes. Echo 2/14/19:  Normal cavity size, wall thickness and systolic function (ejection fraction normal). The muscle mass is normal. The cavity shape is normal. The estimated ejection fraction is 41 - 45%. Abnormal wall motion as described on the wall scoring diagram below. End-systolic volume is normal. Normal left ventricular strain. There is mild (grade 1) left ventricular diastolic dysfunction. Normal left ventricular diastolic pressure. End-diastolic volume is normal.    LE arterial duplex 2/22/19:  There is evidence of left groin pseudoaneurysm noted arising from distal common femoral artery, pseudo lobe measures 2.32cm x 2.58cm and pseudo neck length measuring 0.63cm. There is no evidence of hemodynamically significant left lower extremity arterial obstruction. JACLYN is 1.03 on the right and 1.02 on the left. LE arterial duplex s/p Thrombin Injection to Pseudoaneurysm 2/26/19:  Successful thrombin injection procedure of the left groin with no further flow seen. No evidence of hemodnyamically significant obstruction in the left lower extremity. Left lower extremity arterial duplex performed. Confirmed pseudoaneurysm in left groin with small neck. Following thrombin injection, no further flow seen in the pseudoaneurysm. The left common femoral, profunda femoral, femoral, popliteal, posterior tibial and anterior arteries were imaged. Mainly triphasic flow was seen with no evidence of significantly elevated velocities. Repeat LE arterial duplex 2/27/19:  Continued thrombosed left groin pseudoaneurysm following thrombin injection on 02/26/2019. No flow or color fill is identified. The hematoma measures approximately 2.1 x 2.9 cm in diameter. The common femoral, deep femoral, femoral, and popliteal arteries are patent with mainly tri-phasic flow and no significant hemodynamically obstruction is noted. Stress 5/31/19:  · Normal stress myocardial perfusion without ischemia or infact at 84% MPHR. Normal LV function. LVEF 60%. · No EKG changes of ischemia at peak exercise. · Normal functional capacity. PET 6/03/19: IMPRESSION: No Foci of Abnormal Hypermetabolism (Deauville 1).        Assessment & Plan:   Kirsten Zazueta is a 41 y.o. male comes in for evaluation and management of lymphoma. 1. Follicular lymphoma: Grade 3a. Although grade 3a disease is considered more indolent and can be treated like grade 1/2 disease, this patient has indications for treatment: bulky disease encircling the aorta causing symptoms. Bone marrow negative for lymphoma, but was hypercellular. BR better than RCHOP, but based on GALLIUM study, Obinutuzumab-based induction and maintenance prolongs PFS over that seen with rituximab-based therapy. Therefore, I have chosen to treat patient with O-CHOP regimen for 6 cycles, followed by possible maintenance Obinutuzumab. We discussed the risks and benefits of O-CHOP chemotherapy. The potential side effects include, but are not limited to: nausea, vomiting, diarrhea, constipation, Flu-like symptoms, infusion reaction, allergic reaction, flushing, taste changes, increased risk of infection, anemia, fatigue, alopecia, neuropathy, nail changes, cardiac damage, mucositis, myleosuppression, infertility and rarely, death. Patient completed chemoteaching and received a chemotherapy education folder. CR after 2 cycles of O-CHOP, but switched regiment Bendamustine-O due to cardiac event. Chemotherapy consent signed and patient educated about side effects including: cytopenias, infections, bleeding, n/v/d, hair loss, skin rash, secondary malignancy, kidney or liver toxicity. Completed a total of 4 cycles of Bendamustine-O. PET completed on 6/03/19 shows no foci of abnormal hypermetabolism. -- Surveillance labs, MD visit every 3 months, and imaging based on patient symptoms. -- 6 weeks port flush 7/25/19  -- Follow up in 3 months labs, MD visit on 9/5/19.     2. Use of cardiotoxic chemotherapy: Discussed risks/benefits of using doxorubicin. Echo on 12/17/18 showed EF 65%, but drop to EF 45% immediately after MI. Has seen Dr. Hodan Subramanian who will follow up again in 1 month. 3. Neoplasm related pain / Anxiety: Left lower back pain.  Taking Oxycodone 5mg, 4 tabs per day, approx q6h prn. Signed pain contract on 12/28/18. Counseled on adding SSRI if anxiety becomes worse. -- On Oxycodone 5mg every 6 hours  -- Ativan 0.5mg bid taking TID  -- Lexapro 10mg daily for anxiety. -- Follow up with [facility] next week. -- I discussed the subject of cutting down on his opioids as his PET is now negative. We will continue to discuss. I also suggested adding a medication for any neuropathic component in his pain. 4. HTN / Hyperlipidemia: Well controlled today on BB, ACEI. 5. Tobacco abuse: Reiterated strong recommendation for smoking cessation in the setting of recent MI. Discussed cessation strategies and pt does not want Wellbutrin given past experience with it. Doing well on Chantix 1mg bid and has cut down to 8 cigs/day. -- Continue cutting down on cigarrettes, continue Chantix. 6. Dental infection: Caused neutropenic fever and hospital admission at start of treatment. No abscess. Blood cultures negative. Treated with IV antibiotics followed by Augmentin. Followed up with OMFS. Recommend patient wait at least 4 weeks after chemotherapy for counts to recover. Faxed information to [phone] [place] [address] office. No intervention yet, OMFS is waiting for Dr. Leeanne Miner recommendations. -- Continue work up with OMFS. 7. H/o STEMI / Cardiac arrest: P/w CP on 2/14/19 followed by collapse and cardiac arrest. Cardiac cath at [facility] by Dr. Aaron Ocampo revealed 90-95% occlusion of proximal LAD, TOM placed. Dr. Aaron Ocampo [phone]. Dr. Linsey Palmer recommends: Continue aspirin and Plavix along with high-dose Lipitor and metoprolol. 5/31/19 Stress test normal with EF 65%. -- 7/29 appointment with Marjorie.    -- On dual antiplatelet therapy aspirin and clopidogrel  -- On BB, ACEI, statin      8. H/o Left groin pseudoaneurysm: Resolved. 9. Tremors: Started within the past few weeks, will continue to monitor. Denies dizziness/lightheadedness, chest tyler n or SOB when this occurs.  Does not interfere with writing or buttoning clothing. Does not drink ETOH. Emotional well being: Pt is coping well with his/her disease and has excellent support. Met with SW in the past as well as today. I appreciate the opportunity to participate in  Idalmis Rick 's care.     Signed By: Hitesh Beaulieu MD     June 13, 2019

## 2019-06-12 DIAGNOSIS — F32.9 REACTIVE DEPRESSION: ICD-10-CM

## 2019-06-12 DIAGNOSIS — F41.9 ANXIETY: ICD-10-CM

## 2019-06-12 RX ORDER — ESCITALOPRAM OXALATE 10 MG/1
10 TABLET ORAL DAILY
Qty: 30 TAB | Refills: 6 | Status: SHIPPED | OUTPATIENT
Start: 2019-06-12 | End: 2020-01-07 | Stop reason: DRUGHIGH

## 2019-06-13 ENCOUNTER — HOSPITAL ENCOUNTER (OUTPATIENT)
Dept: INFUSION THERAPY | Age: 44
Discharge: HOME | End: 2019-06-13
Payer: COMMERCIAL

## 2019-06-13 ENCOUNTER — OFFICE VISIT (OUTPATIENT)
Dept: ONCOLOGY | Age: 44
End: 2019-06-13

## 2019-06-13 VITALS
HEART RATE: 78 BPM | RESPIRATION RATE: 18 BRPM | TEMPERATURE: 97.4 F | SYSTOLIC BLOOD PRESSURE: 133 MMHG | DIASTOLIC BLOOD PRESSURE: 90 MMHG

## 2019-06-13 VITALS
HEIGHT: 67 IN | HEART RATE: 78 BPM | WEIGHT: 154 LBS | SYSTOLIC BLOOD PRESSURE: 133 MMHG | RESPIRATION RATE: 16 BRPM | OXYGEN SATURATION: 100 % | DIASTOLIC BLOOD PRESSURE: 90 MMHG | TEMPERATURE: 97.4 F | BODY MASS INDEX: 24.17 KG/M2

## 2019-06-13 DIAGNOSIS — C82.33 FOLLICULAR LYMPHOMA GRADE IIIA OF INTRA-ABDOMINAL LYMPH NODES (HCC): Primary | ICD-10-CM

## 2019-06-13 DIAGNOSIS — R52 PAIN: ICD-10-CM

## 2019-06-13 DIAGNOSIS — I25.10 CORONARY ARTERY DISEASE INVOLVING NATIVE CORONARY ARTERY OF NATIVE HEART WITHOUT ANGINA PECTORIS: ICD-10-CM

## 2019-06-13 DIAGNOSIS — Z71.6 TOBACCO ABUSE COUNSELING: ICD-10-CM

## 2019-06-13 DIAGNOSIS — F41.9 ANXIETY: ICD-10-CM

## 2019-06-13 DIAGNOSIS — C82.90 FOLLICULAR LYMPHOMA, UNSPECIFIED FOLLICULAR LYMPHOMA TYPE, UNSPECIFIED BODY REGION (HCC): Primary | ICD-10-CM

## 2019-06-13 LAB
ALBUMIN SERPL-MCNC: 3.7 G/DL (ref 3.5–5)
ALBUMIN/GLOB SERPL: 1.3 {RATIO} (ref 1.1–2.2)
ALP SERPL-CCNC: 162 U/L (ref 45–117)
ALT SERPL-CCNC: 33 U/L (ref 12–78)
ANION GAP SERPL CALC-SCNC: 5 MMOL/L (ref 5–15)
AST SERPL-CCNC: 17 U/L (ref 15–37)
BASOPHILS # BLD: 0.1 K/UL (ref 0–0.1)
BASOPHILS NFR BLD: 1 % (ref 0–1)
BILIRUB SERPL-MCNC: 0.4 MG/DL (ref 0.2–1)
BUN SERPL-MCNC: 7 MG/DL (ref 6–20)
BUN/CREAT SERPL: 7 (ref 12–20)
CALCIUM SERPL-MCNC: 9.1 MG/DL (ref 8.5–10.1)
CHLORIDE SERPL-SCNC: 108 MMOL/L (ref 97–108)
CO2 SERPL-SCNC: 26 MMOL/L (ref 21–32)
CREAT SERPL-MCNC: 0.98 MG/DL (ref 0.7–1.3)
DIFFERENTIAL METHOD BLD: ABNORMAL
EOSINOPHIL # BLD: 0.6 K/UL (ref 0–0.4)
EOSINOPHIL NFR BLD: 8 % (ref 0–7)
ERYTHROCYTE [DISTWIDTH] IN BLOOD BY AUTOMATED COUNT: 13.3 % (ref 11.5–14.5)
GLOBULIN SER CALC-MCNC: 2.9 G/DL (ref 2–4)
GLUCOSE SERPL-MCNC: 88 MG/DL (ref 65–100)
HCT VFR BLD AUTO: 43.2 % (ref 36.6–50.3)
HGB BLD-MCNC: 14.6 G/DL (ref 12.1–17)
IMM GRANULOCYTES # BLD AUTO: 0 K/UL (ref 0–0.04)
IMM GRANULOCYTES NFR BLD AUTO: 0 % (ref 0–0.5)
LYMPHOCYTES # BLD: 0.9 K/UL (ref 0.8–3.5)
LYMPHOCYTES NFR BLD: 11 % (ref 12–49)
MCH RBC QN AUTO: 31.1 PG (ref 26–34)
MCHC RBC AUTO-ENTMCNC: 33.8 G/DL (ref 30–36.5)
MCV RBC AUTO: 91.9 FL (ref 80–99)
MONOCYTES # BLD: 0.7 K/UL (ref 0–1)
MONOCYTES NFR BLD: 10 % (ref 5–13)
NEUTS SEG # BLD: 5.3 K/UL (ref 1.8–8)
NEUTS SEG NFR BLD: 70 % (ref 32–75)
NRBC # BLD: 0 K/UL (ref 0–0.01)
NRBC BLD-RTO: 0 PER 100 WBC
PLATELET # BLD AUTO: 166 K/UL (ref 150–400)
PMV BLD AUTO: 10.6 FL (ref 8.9–12.9)
POTASSIUM SERPL-SCNC: 3.5 MMOL/L (ref 3.5–5.1)
PROT SERPL-MCNC: 6.6 G/DL (ref 6.4–8.2)
RBC # BLD AUTO: 4.7 M/UL (ref 4.1–5.7)
SODIUM SERPL-SCNC: 139 MMOL/L (ref 136–145)
WBC # BLD AUTO: 7.6 K/UL (ref 4.1–11.1)

## 2019-06-13 PROCEDURE — 77030012965 HC NDL HUBR BBMI -A

## 2019-06-13 PROCEDURE — 80053 COMPREHEN METABOLIC PANEL: CPT

## 2019-06-13 PROCEDURE — 85025 COMPLETE CBC W/AUTO DIFF WBC: CPT

## 2019-06-13 PROCEDURE — 36591 DRAW BLOOD OFF VENOUS DEVICE: CPT

## 2019-06-13 PROCEDURE — 36415 COLL VENOUS BLD VENIPUNCTURE: CPT

## 2019-06-13 RX ORDER — SODIUM CHLORIDE 0.9 % (FLUSH) 0.9 %
5-10 SYRINGE (ML) INJECTION AS NEEDED
Status: ACTIVE | OUTPATIENT
Start: 2019-06-13 | End: 2019-06-13

## 2019-06-13 RX ORDER — SODIUM CHLORIDE 0.9 % (FLUSH) 0.9 %
5-10 SYRINGE (ML) INJECTION AS NEEDED
Status: CANCELLED | OUTPATIENT
Start: 2019-09-05

## 2019-06-13 RX ORDER — HEPARIN 100 UNIT/ML
500 SYRINGE INTRAVENOUS AS NEEDED
Status: ACTIVE | OUTPATIENT
Start: 2019-06-13 | End: 2019-06-13

## 2019-06-13 RX ORDER — HEPARIN 100 UNIT/ML
500 SYRINGE INTRAVENOUS AS NEEDED
Status: CANCELLED | OUTPATIENT
Start: 2019-09-05

## 2019-06-13 RX ORDER — SODIUM CHLORIDE 9 MG/ML
10 INJECTION INTRAMUSCULAR; INTRAVENOUS; SUBCUTANEOUS AS NEEDED
Status: ACTIVE | OUTPATIENT
Start: 2019-06-13 | End: 2019-06-13

## 2019-06-13 RX ORDER — SODIUM CHLORIDE 9 MG/ML
10 INJECTION INTRAMUSCULAR; INTRAVENOUS; SUBCUTANEOUS AS NEEDED
Status: CANCELLED | OUTPATIENT
Start: 2019-09-05

## 2019-06-13 NOTE — PROGRESS NOTES
Josh Lambert is a 41 y.o. male here today for follow up of lymphoma. He reports pain level of 4/10 to lower back.

## 2019-06-13 NOTE — PROGRESS NOTES
Outpatient Infusion Center Progress Note    1000 Pt admit to [facility] for port flush and labs ambulatory in stable condition. Assessment completed. No new concerns voiced. Port accessed with positive blood. Labs drawn from port and sent for processing. Port flushed, heparinized and de-accessed. Visit Vitals  /90   Pulse 78   Temp 97.4 °F (36.3 °C)   Resp 18           1010 Pt tolerated treatment well. D/c home ambulatory in no distress. Pt aware of next appointment scheduled for 7/25/19.     Labs have not resulted at the time of this note

## 2019-06-17 ENCOUNTER — TELEPHONE (OUTPATIENT)
Dept: PALLATIVE CARE | Age: 44
End: 2019-06-17

## 2019-06-17 NOTE — TELEPHONE ENCOUNTER
Called patient to advise/confirm upcoming appt with Dr. Opal Negron on 6/20/19     at 12:30pm at [facility]. Pt confirmed. Also advised to please bring in your Drivers License and Insurance Card with you to appointment as well as any prescription pain medication in the original container with you to appt.

## 2019-06-20 ENCOUNTER — OFFICE VISIT (OUTPATIENT)
Dept: PALLATIVE CARE | Age: 44
End: 2019-06-20

## 2019-06-20 VITALS
DIASTOLIC BLOOD PRESSURE: 82 MMHG | BODY MASS INDEX: 24.01 KG/M2 | HEART RATE: 67 BPM | SYSTOLIC BLOOD PRESSURE: 130 MMHG | WEIGHT: 153 LBS | HEIGHT: 67 IN | OXYGEN SATURATION: 96 % | RESPIRATION RATE: 16 BRPM | TEMPERATURE: 97 F

## 2019-06-20 DIAGNOSIS — F41.9 ANXIETY: ICD-10-CM

## 2019-06-20 DIAGNOSIS — M54.50 CHRONIC LEFT-SIDED LOW BACK PAIN WITHOUT SCIATICA: Primary | ICD-10-CM

## 2019-06-20 DIAGNOSIS — F32.9 REACTIVE DEPRESSION: ICD-10-CM

## 2019-06-20 DIAGNOSIS — R53.83 OTHER FATIGUE: ICD-10-CM

## 2019-06-20 DIAGNOSIS — G89.29 CHRONIC LEFT-SIDED LOW BACK PAIN WITHOUT SCIATICA: Primary | ICD-10-CM

## 2019-06-20 DIAGNOSIS — C82.33 FOLLICULAR LYMPHOMA GRADE IIIA OF INTRA-ABDOMINAL LYMPH NODES (HCC): ICD-10-CM

## 2019-06-20 RX ORDER — OXYCODONE HYDROCHLORIDE 5 MG/1
5 TABLET ORAL
Qty: 50 TAB | Refills: 0 | Status: SHIPPED | OUTPATIENT
Start: 2019-06-20 | End: 2019-07-20

## 2019-06-20 NOTE — PATIENT INSTRUCTIONS
Dear Ji Simms. ,    It was a pleasure seeing you today in [facility]. You had a PET scan done which showed no areas of focal hypermetabolism (no cancer)! You're feeling well. You're active, walking every day. You don't feel as strong as you used to be which you attribute to lack of activity. We will see you again in 4 weeks. Your stated goal: continue treatment for your lymphoma with control of your anxiety and pain    Your described symptoms were: Fatigue: 3 Drowsiness: 0   Depression: 0 Pain: 5   Anxiety: 3 Nausea: 0   Anorexia: 0 Dyspnea: 0   Best Well-Bein Constipation: No   Other Problem (Comment): 0       This is the plan we talked about:    1. We discussed tapering your opioids over the next month. 2. Continue oxycodone 5-mg:   -Take 1 tab 3 times a day for 7 days, then:   -Take 1 tab twice a day for 7 days, then:   -Take 1 tab once daily for 7 days, then;   -Take 1 tab every other day for 7 days, then stop. 3. Start tylenol 500-mg 2 tabs every 6 hours as needed for your back pain. 4. You may also try using a heating pad as needed. 5. I'm referring you to Kota Zavala PT at the [facility] for evaluation and to provide you with an exercise program you can use at home to strengthen the muscles in your low back. 6. She can also work with you on an exercise program to improve your overall endurance. 7. Continue escitalopram 10-mg daily for anxiety. 8. Continue alprazolam 0.5-mg 3 times a day as needed for anxiety. 9. I will schedule your follow-up appointment on a Tuesday when you will have the opportunity to meet with one of our clinical social workers, Aliya Serrano, who works with people on techniques helpful in managing anxiety.       This is what you have shared with us about Advance Care Planning:      Primary Decision Maker: Sharifa Chatman - Father - [phone]    Secondary Decision Maker: Sherrill Mosquera - Other Relative - [phone]  [] Named in a scanned document   [x] Legal Next of Kin  [] Guardian    ACP documents you current have include:  [] Advance Directive or Living Will  [] Durable Do Not Resuscitate  [] Physician Orders for Scope of Treatment (POST)  [] Medical Power of   [] Other      The Palliative Medicine Team is here to support you and your family.          Sincerely,      Mikel Haile MD and the Palliative Medicine Team

## 2019-06-20 NOTE — PROGRESS NOTES
Palliative Medicine Office Visit  Palliative Medicine Nurse Check In  [phone]    Patient Name: Anjana Katz. YOB: 1975      Date of Office Visit: 6/20/2019  Check in time:12:32-12:42    Patient states: Follow up no isues    From Check In Sheet (scanned in Media):  Has a medical provider talked with you about cardiopulmonary resuscitation (CPR)? [x] Yes   [] No   [] Unable to obtain    Nurse reminder to complete or update ACP FlowSheet:    Is ACP on the Problem List?    [] Yes    [x] No  IF ACP Document is ON FILE; Nurse to place ACP on Problem List     Is there an ACP Note in Chart Review/Note? [] Yes    [x] No   If NO: [address] Planning 6/4/2019   Patient's Healthcare Decision Maker is: Verbal statement (Legal Next of Kin remains as decision maker)   Confirm Advance Directive None   Patient Would Like to Complete Advance Directive No   Does the patient have other document types -       Primary Decision Maker: Moraima Baez - Father - [phone]    Secondary Decision Maker: Sherrill Mosquera - Other Relative - [phone]  Advance Care Planning 6/20/2019   Patient's [address] is: Verbal statement (Legal Next of Kin remains as decision maker)   Confirm Advance Directive None   Patient Would Like to Complete Advance Directive No   Does the patient have other document types -       Is there anything that we should know about you as a person in order to provide you the best care possible? Have you been to the ER, urgent care clinic since your last visit? [] Yes   [x] No   [] Unable to obtain    Have you been hospitalized since your last visit? [] Yes   [x] No   [] Unable to obtain    Have you seen or consulted any other health care providers outside of the [address] [place] [address] since your last visit?    [] Yes   [x] No   [] Unable to obtain    Functional status (describe):         Last BM: last night     accessed (date): 6/19/19    Bottle review (for opioid pain medication):  Medication 1: oxyCODONE IR (ROXICODONE) 5 mg immediate release tablet       Date filled: 5/29/19  Directions:    Take 5 mg by mouth every four (4) hours as needed for Pain.              # filled: 120  # left: 3  # pills taking per day:5  Last dose taken:6/20/2019      Medication 2:   Date filled:   Directions:   # filled:   # left:   # pills taking per day:  Last dose taken:    Medication 3:   Date filled:   Directions:   # filled:   # left:   # pills taking per day:  Last dose taken:    Medication 4:   Date filled:   Directions:   # filled:   # left:   # pills taking per day:  Last dose taken:

## 2019-06-20 NOTE — PROGRESS NOTES
Palliative Medicine Outpatient Services  [place]: [phone])    Patient Name: Galdino Fuentes. YOB: 1975    Date of Current Visit: 06/20/19  Location of Current Visit:    [] [facility] Office  [x] [facility] Office  [] [address] [place] [address]  [] Home  [] Other:      Date of Initial Visit: 2/19/19   Referral from: Guillermina Gardner MD  Primary Care Physician: None      SUMMARY:   Galdino Chávez is a 41y.o. year old with high-grade follicular lymphoma, who was referred to Palliative Medicine by Dr. Crista Hines for symptom management and supportive care. He was diagnosed in 11/2018 after presenting with back pain. He is currently receiving systemic chemotherapy. He suffered cardiac arrest during cycle #3 due to previously undiagnosed severe stenosis of the LAD s/p PTCA and stent. He has since completed systemic chemotherapy. His most recent PET-CT (5/2019) showed no focal areas of increased hypermetabolic uptake. The patients social history includes: he is single. He lives with his father in [place]. He has 3 teenage children who live with their mother and with whom he has close contact. 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 He's applied for disability. Palliative Medicine is addressing the following current patient/family concerns: anxiety, depression, left mid- and low back pain related to malignancy, poor appetite, fatigue, advanced care planning. Initial Referral Intake note from Sharath Lockhart RN reviewed prior to visit   PALLIATIVE DIAGNOSES:       ICD-10-CM ICD-9-CM    1. Chronic left-sided low back pain without sciatica M54.5 724.2 oxyCODONE IR (ROXICODONE) 5 mg immediate release tablet    G89.29 338.29    2. Anxiety F41.9 300.00    3. Reactive depression F32.9 300.4    4. Other fatigue R53.83 780.79    5.  Follicular lymphoma grade IIIa of intra-abdominal lymph nodes [facility]) C82.33 202.03           PLAN:   Patient Instructions     Dear Galdino Fuentes. ,    It was a pleasure seeing you today in [place]. You had a PET scan done which showed no areas of focal hypermetabolism (no cancer)! You're feeling well. You're active, walking every day. You don't feel as strong as you used to be which you attribute to lack of activity. We will see you again in 4 weeks. Your stated goal: continue treatment for your lymphoma with control of your anxiety and pain    Your described symptoms were: Fatigue: 3 Drowsiness: 0   Depression: 0 Pain: 5   Anxiety: 3 Nausea: 0   Anorexia: 0 Dyspnea: 0   Best Well-Bein Constipation: No   Other Problem (Comment): 0       This is the plan we talked about:    1. We discussed tapering your opioids over the next month. 2. Continue oxycodone 5-mg:   -Take 1 tab 3 times a day for 7 days, then:   -Take 1 tab twice a day for 7 days, then:   -Take 1 tab once daily for 7 days, then;   -Take 1 tab every other day for 7 days, then stop. 3. Start tylenol 500-mg 2 tabs every 6 hours as needed for your back pain. 4. You may also try using a heating pad as needed. 5. I'm referring you to Joan Bee PT at the [facility] for evaluation and to provide you with an exercise program you can use at home to strengthen the muscles in your low back. 6. She can also work with you on an exercise program to improve your overall endurance. 7. Continue escitalopram 10-mg daily for anxiety. 8. Continue alprazolam 0.5-mg 3 times a day as needed for anxiety. 9. I will schedule your follow-up appointment on a Tuesday when you will have the opportunity to meet with one of our clinical social workers, Cathleen Morales [phone], who works with people on techniques helpful in managing anxiety.       This is what you have shared with us about Advance Care Planning:      Primary Decision Maker: Mack Jorge - Father - [phone]    Secondary Decision Maker: Sherrill Mosquera - Other Relative - [phone]  [] Named in a scanned document   [x] Legal Next of Kin  [] Guardian    ACP documents you current have include:  [] Advance Directive or Living Will  [] Durable Do Not Resuscitate  [] Physician Orders for Scope of Treatment (POST)  [] Medical Power of   [] Other      The Palliative Medicine Team is here to support you and your family. Sincerely,      Rohith Asencio MD and the Palliative Medicine Team      ADDENDUM - plan to start alprazolam taper next visit. Will offer supportive counseling with one of our clinical social workers    Counseling and Coordination: note routed to Dr. Sepideh Leyva / TREATMENT PREFERENCES:   [====Goals of Care====]  GOALS OF CARE:  Patient / health care proxy stated goals: See Patient Instructions / Summary    TREATMENT PREFERENCES:   Code Status:  [x] Attempt Resuscitation       [] Do Not Attempt Resuscitation    Advance Care Planning:  [x] The [facility] Interdisciplinary Team has updated the ACP Navigator with Decision Maker and Patient Capacity      Primary Decision Maker: Yasmeen Bowser - Father - [phone]    Secondary Decision Maker: ToñojimSherrill - Other Relative - [phone]  [] Named in a scanned document   [x] Legal Next of Kin  [] Guardian    Other:  (If patient appropriate for POST, consider using PALLPOST smart phrase here)    The palliative care team has discussed with patient / health care proxy about goals of care / treatment preferences for patient.  [====Goals of Care====]     PRESCRIPTIONS GIVEN:     Medications Ordered Today   Medications    oxyCODONE IR (ROXICODONE) 5 mg immediate release tablet     Sig: Take 1 Tab by mouth every four (4) hours as needed for Pain for up to 30 days. Max Daily Amount: 30 mg.      Dispense:  50 Tab     Refill:  0           FOLLOW UP:     Future Appointments   Date Time Provider Dahlia Epps   7/25/2019 10:30 AM SS INF7 CH3 <1H [facility]   7/29/2019  2:40 PM Riddhi Amador MD [address] [place] [address]   9/5/2019 10:00 AM SS INF4 CH4 <1H [facility]   9/5/2019 10:45 AM Mariluz Blake  [facility], [address]   10/17/2019 10:00 AM SS INF7 CH3 <1H [facility]           PHYSICIANS INVOLVED IN CARE:   Patient Care Team:  None as PCP - Nereida Lombardi MD (Hematology and Oncology)  Kelly Cruz MD as Physician (Palliative Medicine)  Kelly Cruz MD as Physician (Palliative Medicine)       HISTORY:   Reviewed patient-completed ESAS and advance care planning form. Reviewed patient record in prescription monitoring program.    CHIEF COMPLAINT:   Chief Complaint   Patient presents with    Back Pain       HPI/SUBJECTIVE:    The patient is: [x] Verbal / [] Nonverbal     He's OK. He had his scan and saw Dr. Vishal Mckeon. The scan looked good (no cancer). She's going to see him every 3 months now. He now just has some pain in his low back. He's able to do the thing he wants and needs to do without any problems. He tries to walk every day. He stopped going to cardiac rehab. Gas was too expensive. He has a lot of anxiety during the day and uses Xanax. His appetite is good. His weight hasn't changed. He's still smoking about 5 cigarettes a day. He hasn't had any chest pressure lately (normal stress test 5/2019). He's moving his bowels without taking stool softeners or laxatives.       Clinical Pain Assessment (nonverbal scale for nonverbal patients):   [++++ Clinical Pain Assessment++++]  [++++Pain Severity++++]: Pain: 5  [++++Pain Character++++]: stabbing pain in back  [++++Pain Duration++++]: months for back pain, weeks for groin pain  [++++Pain Effect++++]: little  [++++Pain Factors++++]: oxycodone helps with back pain, groin pain elicited by standing and walking  [++++Pain Frequency++++]: constant back pain with varying intensity  [++++Pain Location++++]: left lower back  [++++ Clinical Pain Assessment++++]       FUNCTIONAL ASSESSMENT:     Palliative Performance Scale (PPS):  PPS: 80 PSYCHOSOCIAL/SPIRITUAL SCREENING:     Any spiritual / Sikh concerns:  [] Yes /  [x] No    Caregiver Burnout:  [] Yes /  [x] No /  [] No Caregiver Present      Anticipatory grief assessment:   [x] Normal  / [] Maladaptive       ESAS Anxiety: Anxiety: 3    ESAS Depression: Depression: 0       REVIEW OF SYSTEMS:     The following systems were [x] reviewed / [] unable to be reviewed  Systems: constitutional, ears/nose/mouth/throat, respiratory, gastrointestinal, genitourinary, musculoskeletal, integumentary, neurologic, psychiatric, endocrine. Positive findings noted below. Modified ESAS Completed by: provider   Fatigue: 3 Drowsiness: 0   Depression: 0 Pain: 5   Anxiety: 3 Nausea: 0   Anorexia: 0 Dyspnea: 0   Best Well-Bein Constipation: No   Other Problem (Comment): 0          PHYSICAL EXAM:     Wt Readings from Last 3 Encounters:   19 153 lb (69.4 kg)   19 154 lb (69.9 kg)   19 152 lb (68.9 kg)     Blood pressure 130/82, pulse 67, temperature 97 °F (36.1 °C), temperature source Oral, resp. rate 16, height 5' 7\" (1.702 m), weight 153 lb (69.4 kg), SpO2 96 %.   Last bowel movement: See Nursing Note    Constitutional: sitting in chair, appears rested  Eyes: pupils equal, anicteric  ENMT: no nasal discharge, moist mucous membranes  Cardiovascular: regular rhythm, no peripheral edema  Respiratory: breathing not labored, symmetric  Gastrointestinal: soft non-tender, +bowel sounds  Musculoskeletal: no deformity, no tenderness to palpation  Skin: warm, dry  Neurologic: following commands, moving all extremities  Psychiatric: full affect, no hallucinations  Other:       HISTORY:     Past Medical History:   Diagnosis Date    Anxiety     Cancer [facility])     lymphoma 2018 receiving chemo    Chronic pain     lower back- lymphoma    Hyperlipidemia     Hypertension     Lymphadenopathy 2018      Past Surgical History:   Procedure Laterality Date    HX HEART CATHETERIZATION  2019    HX OTHER SURGICAL  02/2019    cardiac stent    IR INJECTION PSEUDOANEURYSM  2/26/2019      Family History   Problem Relation Age of Onset    Hypertension Father     Diabetes Father     Diabetes Mother       History reviewed, no pertinent family history. Social History     Tobacco Use    Smoking status: Current Every Day Smoker     Packs/day: 1.00     Years: 20.00     Pack years: 20.00    Smokeless tobacco: Never Used   Substance Use Topics    Alcohol use: No     Frequency: Never     No Known Allergies   Current Outpatient Medications   Medication Sig    oxyCODONE IR (ROXICODONE) 5 mg immediate release tablet Take 1 Tab by mouth every four (4) hours as needed for Pain for up to 30 days. Max Daily Amount: 30 mg.    escitalopram oxalate (LEXAPRO) 10 mg tablet Take 1 Tab by mouth daily.  atorvastatin (LIPITOR) 40 mg tablet Take 1 Tab by mouth nightly. Indications: treatment to slow progression of coronary artery disease    varenicline (CHANTIX) 1 mg tablet Take 1 Tab by mouth two (2) times a day for 90 days.  varenicline (CHANTIX STARTER RUDY) 0.5 mg (11)- 1 mg (42) DsPk On Days 1 to 3 take 0.5 mg once daily. Then on Days 4 to 7 take 0.5 mg twice daily. On Day 8 and forward take 1 mg twice daily for 11 weeks.  clopidogrel (PLAVIX) 75 mg tab 1 Tab by Per NG tube route daily.  metoprolol succinate (TOPROL-XL) 25 mg XL tablet Take 1 Tab by mouth daily.  lisinopril (PRINIVIL, ZESTRIL) 10 mg tablet Take 1 Tab by mouth daily. DO NOT TAKE for 5 days    L. acidoph & paracasei- S therm- Bifido (AUSTIN-Q/RISAQUAD) 8 billion cell cap cap Take 1 Cap by mouth daily.  senna-docusate (PERICOLACE) 8.6-50 mg per tablet Take 1 Tab by mouth daily.  aspirin delayed-release (ASPIR-81) 81 mg tablet Take 1 Tab by mouth daily.  magic mouthwash solution Take 15-30 mL by mouth four (4) times daily.  Magic mouth wash   Maalox  Lidocaine 2% viscous   Diphenhydramine oral solution    Swish and spit for mouth pain, ok to swallow for throat pain     Pharmacy to mix equal portions of ingredients to a total volume as indicated in the dispense amount.  prochlorperazine (COMPAZINE) 10 mg tablet Take 1 Tab by mouth every six (6) hours as needed.  ondansetron hcl (ZOFRAN) 8 mg tablet Take 1 Tab by mouth every eight (8) hours as needed for Nausea. (Patient not taking: Reported on 4/29/2019)    naloxone [facility]) 4 mg/actuation nasal spray Use 1 spray intranasally, then discard. Repeat with new spray every 2 min as needed for opioid overdose symptoms, alternating nostrils. (Patient not taking: Reported on 4/29/2019)     No current facility-administered medications for this visit. LAB DATA REVIEWED:     Lab Results   Component Value Date/Time    WBC 7.6 06/13/2019 09:59 AM    HGB 14.6 06/13/2019 09:59 AM    PLATELET 971 86/40/4983 09:59 AM     Lab Results   Component Value Date/Time    Sodium 139 06/13/2019 09:59 AM    Potassium 3.5 06/13/2019 09:59 AM    Chloride 108 06/13/2019 09:59 AM    CO2 26 06/13/2019 09:59 AM    BUN 7 06/13/2019 09:59 AM    Creatinine 0.98 06/13/2019 09:59 AM    Calcium 9.1 06/13/2019 09:59 AM    Magnesium 1.7 01/12/2019 04:05 AM    Phosphorus 2.0 (L) 01/12/2019 04:05 AM      Lab Results   Component Value Date/Time    AST (SGOT) 17 06/13/2019 09:59 AM    Alk.  phosphatase 162 (H) 06/13/2019 09:59 AM    Protein, total 6.6 06/13/2019 09:59 AM    Albumin 3.7 06/13/2019 09:59 AM    Globulin 2.9 06/13/2019 09:59 AM     Lab Results   Component Value Date/Time    INR 1.1 02/22/2019 08:18 PM    Prothrombin time 10.8 02/22/2019 08:18 PM    aPTT 27.8 02/22/2019 08:18 PM      No results found for: IRON, FE, TIBC, IBCT, PSAT, FERR        CONTROLLED SUBSTANCES SAFETY ASSESSMENT (IF ON CONTROLLED SUBSTANCES):     Reviewed opioid safety handout:  [x] Yes   [] No  24 hour opioid dose >150mg morphine equivalent/day:  [] Yes   [x] No  Benzodiazepines:  [x] Yes   [] No  Sleep apnea:  [] Yes   [x] No  Urine Toxicology Testing within last 6 months:  [] Yes   [] No  History of or new aberrant medication taking behaviors:  [] Yes   [] No  Has Narcan been prescribed [] Yes   [x] No          Total time:   Counseling / coordination time:   > 50% counseling / coordination?:

## 2019-06-21 ENCOUNTER — NURSE NAVIGATOR (OUTPATIENT)
Dept: PALLATIVE CARE | Age: 44
End: 2019-06-21

## 2019-06-21 DIAGNOSIS — M54.50 CHRONIC LEFT-SIDED LOW BACK PAIN WITHOUT SCIATICA: Primary | ICD-10-CM

## 2019-06-21 DIAGNOSIS — R53.83 OTHER FATIGUE: ICD-10-CM

## 2019-06-21 DIAGNOSIS — F41.9 ANXIETY: ICD-10-CM

## 2019-06-21 DIAGNOSIS — G89.29 CHRONIC LEFT-SIDED LOW BACK PAIN WITHOUT SCIATICA: Primary | ICD-10-CM

## 2019-06-21 DIAGNOSIS — C82.33 FOLLICULAR LYMPHOMA GRADE IIIA OF INTRA-ABDOMINAL LYMPH NODES (HCC): ICD-10-CM

## 2019-06-21 RX ORDER — ALPRAZOLAM 1 MG/1
1 TABLET ORAL
Qty: 90 TAB | Refills: 0 | OUTPATIENT
Start: 2019-06-21 | End: 2019-07-20 | Stop reason: SDUPTHER

## 2019-06-21 NOTE — PROGRESS NOTES
Palliative Medicine  Nursing Note  [phone])  Fax [phone]      Telephone Call  Patient Name: Trevon Colindres. YOB: [birth year]        Primary Decision Maker: Jas Levy - Father - [phone]    Secondary Decision Maker: Sherrill Mosquera - Other Relative - [phone]   Advance Care Planning 6/20/2019   Patient's Healthcare Decision Maker is: Verbal statement (Legal Next of Kin remains as decision maker)   Confirm Advance Directive None   Patient Would Like to Complete Advance Directive No   Does the patient have other document types -     Per Dr. Edwige Roldan, Xanax 1mg tab by mouth 3 times daily as needed for anxiety #90 ordered and called into patient's pharmacy. Referral placed for PT with Saeid Millan to eval and assist with exercises to help endurance.       Juan Paniagua, RN  Palliative Medicine

## 2019-06-27 ENCOUNTER — TELEPHONE (OUTPATIENT)
Dept: CARDIOLOGY CLINIC | Age: 44
End: 2019-06-27

## 2019-06-27 NOTE — TELEPHONE ENCOUNTER
Patient states that he left a clearance form for Dr. Noé Hutchins to complete and fax to his dentist and they have not received it yet. Please advise.     Phone #: [phone]  Thanks

## 2019-07-01 ENCOUNTER — TELEPHONE (OUTPATIENT)
Dept: PALLATIVE CARE | Age: 44
End: 2019-07-01

## 2019-07-01 NOTE — TELEPHONE ENCOUNTER
Returned call to Mr Esme Cordova. He states he can't decrease the oxycodone tab as suggested. He shared he is still having back and joint pain level is 7/10. Dr Cinthia Coon is doing a taper on the oxycodone. This was the instruction to Mr Esme Cordova:    Continue oxycodone 5-mg:              -Take 1 tab 3 times a day for 7 days, then:              -Take 1 tab twice a day for 7 days, then:              -Take 1 tab once daily for 7 days, then;              -Take 1 tab every other day for 7 days, then stop    He shared he is now going to the gym 4 days a week exercising 30 - 45 minutes. There is a referral  for PT with [facility]. He stated no one has called him yet. I informed him I will follow up with [facility] office and get back with him as to when they can see him. Meanwhile I strongly advised to please restrain for any over exertion exercises at the gym until properly advised by PT. Also to continue with the medication taper as suggested by Dr Cinthia Coon. He verbalized understanding. He last took the oxycodone 5 mg tab at 8:30 AM, also taking Tylenol. I advised trying the warm heating pad as tolerated. Call was made to [facility] office was informed someone will call to schedule PT. Mr Esme Cordova is aware. Message has been forwarded to ADA Erwin for review.

## 2019-07-01 NOTE — TELEPHONE ENCOUNTER
Patient is calling stating that he cannot decrease his oxycodone IR like MD wanted. States he is having a lot of pain and is requesting something to help with all his pain. Advised nurse would call back to discuss.

## 2019-07-05 ENCOUNTER — TELEPHONE (OUTPATIENT)
Dept: PALLATIVE CARE | Age: 44
End: 2019-07-05

## 2019-07-05 NOTE — TELEPHONE ENCOUNTER
Palliative Medicine  Nursing Note  [phone]  Fax [phone]      Telephone Call  Patient Name: Leann Galdamez. YOB: [birth year]        Primary Decision Maker: Sandra Ramos - Father - [phone]    Secondary Decision Maker: Sherrill Mosquera - Other Relative - [phone]   Advance Care Planning 6/20/2019   Patient's Healthcare Decision Maker is: Verbal statement (Legal Next of Kin remains as decision maker)   Confirm Advance Directive None   Patient Would Like to Complete Advance Directive No   Does the patient have other document types -     6:25-6:42- Call placed to Mr. Woody Esteves. He said he has only been able to decrease his Oxycodone 5mg to 3 tablets a day. He has pain to the left side of his lower back that is unchanged since Oct 2018. He has been able to be more active, but has not started Physical therapy. Palliative will look into his referral.  He has 6 tablets left and does not know how he will function if he runs out. He has tried heat and ice and does not feel those help alleviate pain. Discussed several other options for him: Acupuncture or massage at [place] [address]. He does not have funds to pay for gas to get him there and he is afraid of needles. Discussed Advil 1 tab twice daily and/orTylenol extra strength 2 tabs every 6 hours. Suggested trying a lidocaine patch and placing on his lower back. He has not tried this and he was willing to. He has agreed to try the Advil, tylenol, and lidocaine patch combination and will call Palliative on Monday to provide update. Shared thoughts about monitoring his activity level and assessing what flares his pain, and if it may be musculoskeletal in nature. He verbalized understanding.     Jammie Raza RN  Palliative Medicine

## 2019-07-05 NOTE — TELEPHONE ENCOUNTER
Patient called and needs to have pain medication refilled. Said he called on Monday. Please call him.

## 2019-07-08 ENCOUNTER — DOCUMENTATION ONLY (OUTPATIENT)
Dept: PALLATIVE CARE | Age: 44
End: 2019-07-08

## 2019-07-08 ENCOUNTER — TELEPHONE (OUTPATIENT)
Dept: PALLATIVE CARE | Age: 44
End: 2019-07-08

## 2019-07-08 NOTE — TELEPHONE ENCOUNTER
Spoke with  Esme Art, he is out of his oxycodone which was being weaned by Dr. Cinthia Coon. He was on a 4-week taper and ran out over a week early. This inforamtion was shared with Claire Salcido and she will place a call to him.     Emory Villalta RN  Palliative Medicine

## 2019-07-08 NOTE — TELEPHONE ENCOUNTER
Patient is calling to speak to nurse. States he is still having a lot of pain and thinks he may be going through withdrawals, shaking and cold. Advised that I would have a nurse call him back to discuss.

## 2019-07-08 NOTE — PROGRESS NOTES
Received a return call from [facility] from PT. She stated she has an available appointment tomorrow at 2 PM. She will call Mr Burt Jean to see if this works for him.

## 2019-07-08 NOTE — PROGRESS NOTES
Phone conversation with patient    Patient currently supposed to be on week 3 of his oxycodone taper with enough medicines left to last another week but he ran out of all his oxycodone Saturday. He reports uncontrolled back pain but unable to describe pain with any more information. Discussed rationale for opioid tapering especially given a PET scan that does not show any active disease. Mr. Cinthia Borjas was very angry and cursing nonstop throughout the conversation about how much pain he is and and how we should be giving him opioids because he has cancer. He was unable to understand or accept that he does not have evidence of active disease to explain pain etiology. He also thinks he is withdrawing terribly from the oxycodone 5 mg. I explained that withdrawal should not be this severe with such low doses of opioids and he already has Xanax 2 times a day which is the treatment of opioid withdrawal anyway. He refuses to believe any of this and accuses palliative medicine of not attending to his needs. He is angry that cancer rehab program has not reached out to him to make an appointment. Plan  -Back pain of unknown etiology  -I do not see a need to refill his oxycodone or give him tramadol which he requested. -Instructed to take Tylenol 650 mg 4 times a day along with Aleve or ibuprofen 400 mg every 6 hours as needed.  -Discussed referral to orthopedics which he refused.

## 2019-07-08 NOTE — PROGRESS NOTES
Follow up call has been made to the office of Nelda English with regards to the PT. I left a V/M along with a email asking for someone to call PM back ASAP.

## 2019-07-15 ENCOUNTER — TELEPHONE (OUTPATIENT)
Dept: PALLATIVE CARE | Age: 44
End: 2019-07-15

## 2019-07-15 NOTE — TELEPHONE ENCOUNTER
Palliative Medicine  Nursing Note  [phone]  Fax [phone]      Telephone Call  Patient Name: Mohamud Graandos. YOB: 1975    7/15/2019        Primary Decision Maker: Jennifer Dee - Father - [phone]    Secondary Decision Maker: Sherrill Mosquera - Other Relative - [phone]   Advance Care Planning 6/20/2019   Patient's Healthcare Decision Maker is: Verbal statement (Legal Next of Kin remains as decision maker)   Confirm Advance Directive None   Patient Would Like to Complete Advance Directive No   Does the patient have other document types -       Palliative Medicine  Nursing Note  [phone]  Fax [phone]      Telephone Call  Patient Name: Mohamud Granados. YOB: 1975    7/15/2019        Primary Decision Maker: Jennifer Dee - Father - [phone]    Secondary Decision Maker: Sherrill Mosquera - Other Relative - [phone]   Advance Care Planning 6/20/2019   Patient's Healthcare Decision Maker is: Verbal statement (Legal Next of Kin remains as decision maker)   Confirm Advance Directive None   Patient Would Like to Complete Advance Directive No   Does the patient have other document types -     4:17-4:18. Call placed to Mr. Indira Lynn. He asked if Dr. Willian Vazquez can refill his Xanax by Sunday as he will be out by then. He filled his last prescription on 6/21/19. Informed him that Dr. Willian Vazquez will be updated and Palliative can call prescription in if approved. He was appreciative. He shared that he was doing, \"okay\" and did not have any new concerns at present. Patient is currently on Xanax 1mg 3 times daily as needed for anxiety #90.     Betty Mariano, RN  Palliative Medicine    Betty Mariano, ADA  Palliative Medicine

## 2019-07-16 ENCOUNTER — TELEPHONE (OUTPATIENT)
Dept: PALLATIVE CARE | Age: 44
End: 2019-07-16

## 2019-07-17 ENCOUNTER — HOSPITAL ENCOUNTER (OUTPATIENT)
Dept: PHYSICAL THERAPY | Age: 44
Discharge: HOME | End: 2019-07-17
Payer: COMMERCIAL

## 2019-07-17 PROCEDURE — 97530 THERAPEUTIC ACTIVITIES: CPT | Performed by: PHYSICAL THERAPIST

## 2019-07-17 PROCEDURE — 97162 PT EVAL MOD COMPLEX 30 MIN: CPT | Performed by: PHYSICAL THERAPIST

## 2019-07-17 PROCEDURE — 97110 THERAPEUTIC EXERCISES: CPT | Performed by: PHYSICAL THERAPIST

## 2019-07-17 NOTE — PROGRESS NOTES
[address] [place] [address] [place] [address] Julio Clark 59  Phone: [phone]  Fax: [phone]    Plan of Care/ Statement of Necessity for Physical Therapy Services 2-15    Patient name: Dennis Alvarez Sr.  : 1975  Provider#: [id number]  Referral source: Ayesha Velazquez MD      Medical/Treatment Diagnosis: Low back pain [M54.5]     Prior Hospitalization: see medical history     Comorbidities: Lymphoma, HTN  Prior Level of Function: Pt has had LBP since prior to dx in 2018 and has difficulty with all mobility. Medications: Verified on Patient Summary List    Start of Care: 2019      Onset Date: 2018       The Plan of Care and following information is based on the information from the initial evaluation. Assessment/ key information: Pt is a 41 y.o male dx with follicular lymphoma who's chief c/o is LBP L>R. Pt presents with antalgic gait, impaired balance, decreased spine and LE motion and will benefit from skilled PT services to address functional mobility deficits, address ROM deficits, address strength deficits, analyze and address soft tissue restrictions, analyze and cue movement patterns, analyze and modify body mechanics/ergonomics, assess and modify postural abnormalities, address imbalance/dizziness and instruct in home and community integration to address rehab goals per plan of care              Evaluation Complexity History MEDIUM  Complexity : 1-2 comorbidities / personal factors will impact the outcome/ POC ; Examination HIGH Complexity : 4+ Standardized tests and measures addressing body structure, function, activity limitation and / or participation in recreation  ;Presentation MEDIUM Complexity : Evolving with changing characteristics  ; Clinical Decision Making MEDIUM Complexity : FOTO score of 26-74  Overall Complexity Rating: MEDIUM    Problem List: pain affecting function, decrease ROM, decrease strength, impaired gait/ balance, decrease ADL/ functional abilitiies, decrease activity tolerance and decrease transfer abilities   Treatment Plan may include any combination of the following: Therapeutic exercise, Therapeutic activities, Neuromuscular re-education, Physical agent/modality, Gait/balance training, Manual therapy, Patient education, Functional mobility training, Home safety training and Stair training  Patient / Family readiness to learn indicated by: other listening  Persons(s) to be included in education: patient (P)  Barriers to Learning/Limitations: None  Patient Goal (s): to stop pain  Patient Self Reported Health Status: poor  Rehabilitation Potential: good    Short Term Goals:  To be completed in 2 treatments  1) Pt will be Independent with skin care routine to decrease risk of infection. 2) Pt will verbalize home modifications to be performed to decrease fall risk. 3) Pt will know which signs and symptoms to report immediately to physician concerning their condition.     Long Term Goals: To be completed in 20 treatments  1) Pt will perform supine to and from sit and sit to and from standing Independently and without arm use  in order to increase safe mobility  2) Pt will be Independent with HEP for mobility, balance, and motion/strengthening exercises in order to maintain gains achieved in therapy. 3) Pt will ambulate with appropriate a.d Independently x 350 ft with no LOB in order to decrease fall risk and increase mobility. 4) Pt will reduce number (2) and length (3 mins or <)  of rest breaks needed for performance of HEP in order to decrease fatigue and increase functional mobility. 5) Pt will have increased spine and LE motion by 30-40% in order to decrease pain and increase mobility        Frequency / Duration: Patient to be seen 1-2 times per week for 20 treatments.     Patient/ Caregiver education and instruction: activity modification and exercises    [x]  Plan of care has been reviewed with PTA    Audrene Line LATONIA Scruggs 7/17/2019     ________________________________________________________________________    I certify that the above Therapy Services are being furnished while the patient is under my care. I agree with the treatment plan and certify that this therapy is necessary.     [de-identified] Signature:____________________  Date:____________Time: _________

## 2019-07-17 NOTE — PROGRESS NOTES
PT ONCOLOGY EVAL/DAILY NOTE    Patient Name: Blair Litten.  Date:2019  : 1975  [x]  Patient  Verified  Payor: [employer] / Plan: [id number] / Product Type: Managed Care Medicaid /    In time:1:00  Out time:2:00  Total Treatment Time (min): 60  Total Timed Codes (min): 45     Visit #: 1 of 20    Treatment Area: No admission diagnoses are documented for this encounter. LBP M54.5    SUBJECTIVE    Current symptoms/Complaints: Pt reports onset of LBP that lead to a diagnosis of lymphoma, has had same pain throughout all treatments with no relief, has tried combo of tylenol/ibuprofen, Tylenol PM, oxycodone, lidocaine patches without noticeable relief. Reports movement makes him feel better, walks about 45 mins around neighborhood.  Trying to quit smoking    [x] Constant []Intermittent    []Improving  [x]Staying the Same  []Getting worse    Subjective functional status:     Present Symptoms/History:   Past Medical History:   Diagnosis Date    Anxiety      Cancer [facility])       lymphoma 2018 receiving chemo    Chronic pain       lower back- lymphoma    Hyperlipidemia      Hypertension      Lymphadenopathy 2018            Past Surgical History:   Procedure Laterality Date    HX HEART CATHETERIZATION   2019    HX OTHER SURGICAL   2019     cardiac stent    IR INJECTION PSEUDOANEURYSM   2019        Referring Provider: Trista Ram MD   Medical Oncologist: Dr Michelle Urias   Primary Care Physician: None  Next Appointment: 2019 with Dr Guicho Singh  Diagnosis: Romy Magallon currently in remission since completing chemo/immunotherapy in 2019 and test results in  showed stable disease no progression  Precautions/Indications: infection,   Hematology Oncology Treatment History: From Dr Michelle Urias office visit 2019      Diagnosis: Follicular lymphoma     Stage: IV     Pathology:   18 right inguinal LN excision: Follicular lymphoma, high-grade (grade 3a of 3). Comment   The delaney architecture is entirely effaced by atypical lymphocytic proliferation with prominent nodular growth pattern. The majority of the atypical lymphocytes are small to medium in size and have irregular nuclear outlines and inconspicuous nucleoli, morphologically consistent with centrocytes. Scattered large lymphocytes with prominent nucleoli, consistent with centroblasts are identified (> 15 per high-power field). Focal increase in mitotic figures is noted. Occasional follicular dendritic cells are seen. By immunohistochemistry, the atypical lymphocytes are positive for CD20, PAX5, CD10, BCL6 and BCL2 (weak, focal) and negative for MUM1. CD3, CD5 and CD43 stain numerous background T lymphocytes. Ki-67 reveals a high proliferation index in the neoplastic follicles (overall 24-39%). Clinical history indicates 14 cm retroperitoneal mass with bilateral inguinal lymphadenopathy. In summary, the combined morphologic and phenotypic findings are diagnostic of a high-grade follicular lymphoma (grade 3a of 3). There is no evidence of diffuse large B-cell lymphoma. Flow cytometry analysis:   Monoclonal B-cell population (47 % of all cells) with mild increase in side and forward scatter properties expressing CD19, CD20, CD23 and CD10 with surface lambda light chain restriction. No phenotypically aberrant T-cell population. Flow cytometry was performed at [facility]      Prior Treatment: Obinutuzumab-CHOP. Obinutuzumab: 1000 mg weekly on days 1, 8, 15 for cycle 1, then 1000 mg on day 1 q21 days for cycles 2-6, then monotherapy 1000 mg every 21 days for cycle 7, 8 with Cyclophosphamide 750mg/m2, Doxorubicin 50mg/m2, Vincristine 1.4mg/m2 on day 1 and Prednisone 100mg on Days 1-5, every 21 days for a total of 2 cycles completed late 1/2019. Regimen discontinued due to NSTEMI.   Obinutuzumab + Bendamustine: 1000 mg Obinutuzumab on day 1 + Bendamustine 90mg/m2 on days 1-2 on a 28-day cycle x 4 cycles      Current Treatment: Surveillance                 Pain Intensity:8 on a scale of 0-10  · Pain Aggravating Factors: \"everything\"  · Pain Relieving Factors: \"nothing\"   Fatigue Intensity: 5 on a scale of 0-10   Has your activity changed since diagnosis? yes  Limitations/Prior level of functioning: \"I have had so much LBP since this all started, no relief, trying to get out of car is tough, and I have to move so slow to do anything  Dominant Hand: right handed  Personal Goals: \"no more pain\"  Home Situation (including environment, stairs, family support, if living alone, any DME used at home):  Lives with father in 1 story home, no a.d in home  Work Status: trying to get disability right now, was a cook   Current meds:   Current Outpatient Medications   Medication Sig    oxyCODONE IR (ROXICODONE) 5 mg immediate release tablet Take 5 mg by mouth every four (4) hours as needed for Pain.  ALPRAZolam (XANAX) 1 mg tablet Take 1 Tab by mouth three (3) times daily as needed for Anxiety for up to 30 days. Max Daily Amount: 3 mg.  atorvastatin (LIPITOR) 40 mg tablet Take 1 Tab by mouth nightly. Indications: treatment to slow progression of coronary artery disease    varenicline (CHANTIX) 1 mg tablet Take 1 Tab by mouth two (2) times a day for 90 days.  varenicline (CHANTIX STARTER RUDY) 0.5 mg (11)- 1 mg (42) DsPk On Days 1 to 3 take 0.5 mg once daily. Then on Days 4 to 7 take 0.5 mg twice daily. On Day 8 and forward take 1 mg twice daily for 11 weeks.  escitalopram oxalate (LEXAPRO) 10 mg tablet Take 1 Tab by mouth daily.  clopidogrel (PLAVIX) 75 mg tab 1 Tab by Per NG tube route daily.  metoprolol succinate (TOPROL-XL) 25 mg XL tablet Take 1 Tab by mouth daily.  lisinopril (PRINIVIL, ZESTRIL) 10 mg tablet Take 1 Tab by mouth daily. DO NOT TAKE for 5 days    L. acidoph & paracasei- S therm- Bifido (AUSTIN-Q/RISAQUAD) 8 billion cell cap cap Take 1 Cap by mouth daily.     magic mouthwash solution Take 15-30 mL by mouth four (4) times daily. Magic mouth wash   Maalox  Lidocaine 2% viscous   Diphenhydramine oral solution    Swish and spit for mouth pain, ok to swallow for throat pain      Pharmacy to mix equal portions of ingredients to a total volume as indicated in the dispense amount.  prochlorperazine (COMPAZINE) 10 mg tablet Take 1 Tab by mouth every six (6) hours as needed.  senna-docusate (PERICOLACE) 8.6-50 mg per tablet Take 1 Tab by mouth daily.  ondansetron hcl (ZOFRAN) 8 mg tablet Take 1 Tab by mouth every eight (8) hours as needed for Nausea. (Patient not taking: Reported on 4/29/2019)    naloxone [facility]) 4 mg/actuation nasal spray Use 1 spray intranasally, then discard. Repeat with new spray every 2 min as needed for opioid overdose symptoms, alternating nostrils.  (Patient not taking: Reported on 4/29/2019)    aspirin delayed-release (ASPIR-81) 81 mg tablet Take 1 Tab by mouth daily.        Barriers:    [x]N/A []pain []financial []time []transportation []other  Motivation: unknown  Substance use:   []N/A  []Alcohol [x]Tobacco []other:   Cognition: A & O x 4       OBJECTIVE    Ports/ Drains: PAC flushed every 4-6 wks, no signs of infection  Skin/Soft Tissue: Skin of LE's dry, no signs of infection  Vitals: OT=119/86  HR=76 SpO2=95%  Outcome Measures: FOTO=40/100       Lumbar AROM:          R  L    Flexion     10    Extension    5    Side Bending  14  11    Rotation   16  12        LOWER QUARTER   MUSCLE STRENGTH  KEY      R  L  0 - No Contraction  L1, L2 Psoas  4  4-  1 - Trace   L3 Quads  4  4-  2 - Poor   L4 Tib Ant  4  4-  3 - Fair   L5 EHL  4  4  4 - Good   S1 FHL  4  4  5 - Normal   S2 Hams  4  4-        Neurological: Reflexes / Sensations: Intact to light touch throughout LE's, normal and = patellar reflexes    LE ROM: All motions with pain and limited by at least 50%  Mobility/Gait: Sit to stand=full arm support with pain, decreased speed/apprehension, SLS=0 secs B, Gait: Pt ambulates 100 ft without a.d, decreased speed and WBOS, shuffling gait and reports of pain at initiation but decreases the further he walks or the longer he stands   Palpation: Not tolerated in lumbar spine or extremities  Posture: forward head, rounded shoulders, flexed hips  Breath pattern assessment  Diaphragm: unable to perform, not primary  Anterior chest primary   Accessory respiratory muscle use B scalenes       15 min [x]Eval                  []Re-Eval       30 min Therapeutic Exercise:  [] See flow sheet :   Rationale: increase ROM to improve the patients ability to perform ADL's required for return to work and/or community             With   [x] TE   [x] TA   [] neuro   [] other: Patient Education: [] Review HEP    [] Progressed/Changed HEP based on:   [x] positioning   [x] body mechanics   [x] transfers   [] heat/ice application    [] other:           Pain Level (0-10 scale) post treatment: 8    ASSESSMENT/Changes in Function: Pt is a 41 y.o male dx with follicular lymphoma who's chief c/o is LBP L>R.  Pt presents with antalgic gait, impaired balance, decreased spine and LE motion and will benefit from skilled PT services to address functional mobility deficits, address ROM deficits, address strength deficits, analyze and address soft tissue restrictions, analyze and cue movement patterns, analyze and modify body mechanics/ergonomics, assess and modify postural abnormalities, address imbalance/dizziness and instruct in home and community integration to address rehab goals per plan of care          Goals:  See Plan of Care    PLAN  []  Upgrade activities as tolerated     [x]  Continue plan of care  []  Update interventions per flow sheet       []  Discharge due to:_  []  Other:_      Eliazar MCCONNELL, DAVIS-MARGUERITE 7/17/2019  1:25 PM

## 2019-07-20 DIAGNOSIS — C82.33 FOLLICULAR LYMPHOMA GRADE IIIA OF INTRA-ABDOMINAL LYMPH NODES (HCC): ICD-10-CM

## 2019-07-20 DIAGNOSIS — F41.9 ANXIETY: ICD-10-CM

## 2019-07-20 RX ORDER — ALPRAZOLAM 1 MG/1
1 TABLET ORAL
Qty: 90 TAB | Refills: 0 | Status: SHIPPED | OUTPATIENT
Start: 2019-07-20 | End: 2019-07-24 | Stop reason: SDUPTHER

## 2019-07-20 NOTE — PROGRESS NOTES
Patient called for refill earlier in the week on xanax and called back today. He is due and the PCP has been checked.   Have sent in a one month supply to his pharmacy and he will f/u with Dr. Estela Oconnor

## 2019-07-22 ENCOUNTER — TELEPHONE (OUTPATIENT)
Dept: PALLATIVE CARE | Age: 44
End: 2019-07-22

## 2019-07-22 NOTE — TELEPHONE ENCOUNTER
Calling patient to r/s his appt for 7/23/19 . No answer and cannot leave voicemail since box is full.

## 2019-07-22 NOTE — TELEPHONE ENCOUNTER
Trying to call patient again, however unable to leave message mail box full. Calling father, Emergency Contact to ask him to get patient to call our office. No answer so left voicemail.

## 2019-07-24 ENCOUNTER — OFFICE VISIT (OUTPATIENT)
Dept: PALLATIVE CARE | Age: 44
End: 2019-07-24

## 2019-07-24 VITALS
BODY MASS INDEX: 24.52 KG/M2 | RESPIRATION RATE: 16 BRPM | WEIGHT: 156.2 LBS | TEMPERATURE: 98.1 F | HEART RATE: 89 BPM | DIASTOLIC BLOOD PRESSURE: 105 MMHG | SYSTOLIC BLOOD PRESSURE: 154 MMHG | HEIGHT: 67 IN | OXYGEN SATURATION: 96 %

## 2019-07-24 DIAGNOSIS — F41.9 ANXIETY: ICD-10-CM

## 2019-07-24 DIAGNOSIS — C82.90 FOLLICULAR LYMPHOMA, UNSPECIFIED FOLLICULAR LYMPHOMA TYPE, UNSPECIFIED BODY REGION (HCC): ICD-10-CM

## 2019-07-24 DIAGNOSIS — M54.50 CHRONIC LEFT-SIDED LOW BACK PAIN WITHOUT SCIATICA: Primary | ICD-10-CM

## 2019-07-24 DIAGNOSIS — G89.29 CHRONIC LEFT-SIDED LOW BACK PAIN WITHOUT SCIATICA: Primary | ICD-10-CM

## 2019-07-24 DIAGNOSIS — R63.0 POOR APPETITE: ICD-10-CM

## 2019-07-24 DIAGNOSIS — R53.83 OTHER FATIGUE: ICD-10-CM

## 2019-07-24 RX ORDER — ALPRAZOLAM 1 MG/1
1 TABLET ORAL
Qty: 90 TAB | Refills: 0 | Status: SHIPPED | OUTPATIENT
Start: 2019-08-19 | End: 2019-09-16 | Stop reason: SDUPTHER

## 2019-07-24 NOTE — PROGRESS NOTES
Palliative Medicine Office Visit  Palliative Medicine Nurse Check In  [phone]    Patient Name: Lillie Moss  YOB: 1975      Date of Office Visit: 7/24/2019      Patient states: \" I can't go back to work my disability was denied. \"    From Check In Sheet (scanned in Media):  Has a medical provider talked with you about cardiopulmonary resuscitation (CPR)? [x] Yes   [] No   [] Unable to obtain    Nurse reminder to complete or update ACP FlowSheet:    Is ACP on the Problem List?    [] Yes    [x] No  IF ACP Document is ON FILE; Nurse to place ACP on Problem List     Is there an ACP Note in Chart Review/Note? [] Yes    [x] No   If NO: [address] 6/20/2019   Patient's Healthcare Decision Maker is: Verbal statement (Legal Next of Kin remains as decision maker)   Confirm Advance Directive None   Patient Would Like to Complete Advance Directive No   Does the patient have other document types -       Is there anything that we should know about you as a person in order to provide you the best care possible? Have you been to the ER, urgent care clinic since your last visit? [] Yes   [x] No   [] Unable to obtain    Have you been hospitalized since your last visit? [] Yes   [x] No   [] Unable to obtain    Have you seen or consulted any other health care providers outside of the [address] [place] [address] since your last visit? [] Yes   [x] No   [] Unable to obtain    Functional status (describe):     Last BM: last night     accessed (date): 7/23/19    Bottle review (for opioid pain medication):  Medication 1: No pain medication left.   Date filled:   Directions:   # filled:   # left:   # pills taking per day:  Last dose taken:    Medication 2:   Date filled:   Directions:   # filled:   # left:   # pills taking per day:  Last dose taken:    Medication 3:   Date filled:   Directions:   # filled:   # left:   # pills taking per day:  Last dose taken:     Medication 4:   Date filled:   Directions:   # filled:   # left:   # pills taking per day:  Last dose taken:

## 2019-07-24 NOTE — PATIENT INSTRUCTIONS
Dear Alejandro Alvarez ,    It was a pleasure seeing you today in [facility]. You did a great job with your oxycodone taper! You've started physical therapy at the [facility] and you continue going to the gym at least once a week. We will see you again on Thursday, 2019 to coordinate with your return to see Dr. Zaki Hou. Your described symptoms were: Fatigue: 5 Drowsiness: 0   Depression: 0 Pain: 9   Anxiety: 10 Nausea: 0   Anorexia: 0 Dyspnea: 0   Best Well-Bein Constipation: No   Other Problem (Comment): 0       This is the plan we talked about:      1. Start tylenol 500-mg 2 tabs every 6 hours as needed for your back pain. 2. Avoid taking ibuprofen, naprosyn or other over-the-counter pain relievers which may interact with your blood thinner. 3. Continue physical therapy twice weekly. 4. Continue with the exercise program prescribed by your physical therapist. This is an important part of your recovery program!    5. Continue using ice packs or heat for additional pain relief. 6. We talked today about the stressors in your life, including loss of income and the challenges with getting approved for disability. You are also worried about your father's new prostate cancer diagnosis. 7. Continue escitalopram 10-mg daily for anxiety. 8. Continue alprazolam 0.5-mg 3 times a day as needed for anxiety. 9. Our clinical social workers, Shirline Manual and [employer], are available to meet with you for additional support.     This is what you have shared with us about Advance Care Planning:      Primary Decision Maker: Pam Chandra - Father - [phone]    Secondary Decision Maker: Sherrill Mosquera - Other Relative - [phone]  [] Named in a scanned document   [x] Legal Next of Kin  [] Guardian    ACP documents you current have include:  [] Advance Directive or Living Will  [] Durable Do Not Resuscitate  [] Physician Orders for Scope of Treatment (POST)  [] Medical Power of   [] Other      The Palliative Medicine Team is here to support you and your family. Sincerely,      Edmundo Miramontes MD and the Palliative Medicine Team     Back Pain: Care Instructions  Your Care Instructions    Back pain has many possible causes. It is often related to problems with muscles and ligaments of the back. It may also be related to problems with the nerves, discs, or bones of the back. Moving, lifting, standing, sitting, or sleeping in an awkward way can strain the back. Sometimes you don't notice the injury until later. Arthritis is another common cause of back pain. Although it may hurt a lot, back pain usually improves on its own within several weeks. Most people recover in 12 weeks or less. Using good home treatment and being careful not to stress your back can help you feel better sooner. Follow-up care is a key part of your treatment and safety. Be sure to make and go to all appointments, and call your doctor if you are having problems. It's also a good idea to know your test results and keep a list of the medicines you take. How can you care for yourself at home? · Sit or lie in positions that are most comfortable and reduce your pain. Try one of these positions when you lie down:  ? Lie on your back with your knees bent and supported by large pillows. ? Lie on the floor with your legs on the seat of a sofa or chair. ? Lie on your side with your knees and hips bent and a pillow between your legs. ? Lie on your stomach if it does not make pain worse. · Do not sit up in bed, and avoid soft couches and twisted positions. Bed rest can help relieve pain at first, but it delays healing. Avoid bed rest after the first day of back pain. · Change positions every 30 minutes. If you must sit for long periods of time, take breaks from sitting. Get up and walk around, or lie in a comfortable position.   · Try using a heating pad on a low or medium setting for 15 to 20 minutes every 2 or 3 hours. Try a warm shower in place of one session with the heating pad. · You can also try an ice pack for 10 to 15 minutes every 2 to 3 hours. Put a thin cloth between the ice pack and your skin. · Take pain medicines exactly as directed. ? If the doctor gave you a prescription medicine for pain, take it as prescribed. ? If you are not taking a prescription pain medicine, ask your doctor if you can take an over-the-counter medicine. · Take short walks several times a day. You can start with 5 to 10 minutes, 3 or 4 times a day, and work up to longer walks. Walk on level surfaces and avoid hills and stairs until your back is better. · Return to work and other activities as soon as you can. Continued rest without activity is usually not good for your back. · To prevent future back pain, do exercises to stretch and strengthen your back and stomach. Learn how to use good posture, safe lifting techniques, and proper body mechanics. When should you call for help? Call your doctor now or seek immediate medical care if:    · You have new or worsening numbness in your legs.     · You have new or worsening weakness in your legs. (This could make it hard to stand up.)     · You lose control of your bladder or bowels.    Watch closely for changes in your health, and be sure to contact your doctor if:    · You have a fever, lose weight, or don't feel well.     · You do not get better as expected. Where can you learn more? Go to http://manisha-anne marie.info/. Enter H374 in the search box to learn more about \"Back Pain: Care Instructions. \"  Current as of: September 20, 2018  Content Version: 12.1  © 6021-1494 [employer]. Care instructions adapted under license by [employer] (which disclaims liability or warranty for this information).  If you have questions about a medical condition or this instruction, always ask your healthcare professional. [address] any warranty or liability for your use of this information.

## 2019-07-24 NOTE — PROGRESS NOTES
Palliative Medicine Outpatient Services  Paramjit: [phone] ([phone])    Patient Name: Nusrat Erickson  YOB: 1975    Date of Current Visit: 07/24/19  Location of Current Visit:    [] [facility] Office  [x] [facility] Office  [] [facility] Office  [] Home  [] Other:      Date of Initial Visit: 2/19/19   Referral from: Dereck Rodriguez MD  Primary Care Physician: None      SUMMARY:   Nusrat Erickson is a 41y.o. year old with high-grade follicular lymphoma, who was referred to Palliative Medicine by Dr. Richard Ward for symptom management and supportive care. He was diagnosed in 11/2018 after presenting with back pain. He is currently receiving systemic chemotherapy. He suffered cardiac arrest during cycle #3 due to previously undiagnosed severe stenosis of the LAD s/p PTCA and stent. He has since completed systemic chemotherapy. His most recent PET-CT (5/2019) showed no focal areas of increased hypermetabolic uptake. The patients social history includes: he is single. He lives with his father in [place]. He has 3 teenage children who live with their mother and with whom he has close contact. 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 He's applied for disability. Palliative Medicine is addressing the following current patient/family concerns: anxiety, depression, left mid- and low back pain related to malignancy, poor appetite, fatigue, advanced care planning. Initial Referral Intake note from Nanette Rubi RN reviewed prior to visit   PALLIATIVE DIAGNOSES:       ICD-10-CM ICD-9-CM    1. Chronic left-sided low back pain without sciatica M54.5 724.2     G89.29 338.29    2. Anxiety F41.9 300.00 ALPRAZolam (XANAX) 1 mg tablet   3. Other fatigue R53.83 780.79    4. Poor appetite R63.0 783.0    5.  Follicular lymphoma, unspecified follicular lymphoma type, unspecified body region [facility]) C82.90 202.00           PLAN:   Patient Instructions     Dear Nusrat Erickson ,    It was a pleasure seeing you today in [facility]. You did a great job with your oxycodone taper! You've started physical therapy at the [facility] and you continue going to the gym at least once a week. We will see you again on  to coordinate with your return to see Dr. Idnra Virgen. Your described symptoms were: Fatigue: 5 Drowsiness: 0   Depression: 0 Pain: 9   Anxiety: 10 Nausea: 0   Anorexia: 0 Dyspnea: 0   Best Well-Bein Constipation: No   Other Problem (Comment): 0       This is the plan we talked about:      1. Start tylenol 500-mg 2 tabs every 6 hours as needed for your back pain. 2. Avoid taking ibuprofen, naprosyn or other over-the-counter pain relievers which may interact with your blood thinner. 3. Continue physical therapy twice weekly. 4. Continue with the exercise program prescribed by your physical therapist. This is an important part of your recovery program!    5. Continue using ice packs or heat for additional pain relief. 6. We talked today about the stressors in your life, including loss of income and the challenges with getting approved for disability. You are also worried about your father's new prostate cancer diagnosis. 7. Continue escitalopram 10-mg daily for anxiety. 8. Continue alprazolam 0.5-mg 3 times a day as needed for anxiety. 9. Our clinical social workers, Lamont Stanley and [employer], are available to meet with you for additional support.     This is what you have shared with us about Advance Care Planning:      Primary Decision Maker: Nir Salinas - Father - [phone]    Secondary Decision Maker: Sherrill Mosquera - Other Relative - [phone]  [] Named in a scanned document   [x] Legal Next of Kin  [] Guardian    ACP documents you current have include:  [] Advance Directive or Living Will  [] Durable Do Not Resuscitate  [] Physician Orders for Scope of Treatment (POST)  [] Medical Power of   [] Other      The Palliative Medicine Team is here to support you and your family. Sincerely,      Anette Kessler MD and the Palliative Medicine Team     Back Pain: Care Instructions  Your Care Instructions    Back pain has many possible causes. It is often related to problems with muscles and ligaments of the back. It may also be related to problems with the nerves, discs, or bones of the back. Moving, lifting, standing, sitting, or sleeping in an awkward way can strain the back. Sometimes you don't notice the injury until later. Arthritis is another common cause of back pain. Although it may hurt a lot, back pain usually improves on its own within several weeks. Most people recover in 12 weeks or less. Using good home treatment and being careful not to stress your back can help you feel better sooner. Follow-up care is a key part of your treatment and safety. Be sure to make and go to all appointments, and call your doctor if you are having problems. It's also a good idea to know your test results and keep a list of the medicines you take. How can you care for yourself at home? · Sit or lie in positions that are most comfortable and reduce your pain. Try one of these positions when you lie down:  ? Lie on your back with your knees bent and supported by large pillows. ? Lie on the floor with your legs on the seat of a sofa or chair. ? Lie on your side with your knees and hips bent and a pillow between your legs. ? Lie on your stomach if it does not make pain worse. · Do not sit up in bed, and avoid soft couches and twisted positions. Bed rest can help relieve pain at first, but it delays healing. Avoid bed rest after the first day of back pain. · Change positions every 30 minutes. If you must sit for long periods of time, take breaks from sitting. Get up and walk around, or lie in a comfortable position. · Try using a heating pad on a low or medium setting for 15 to 20 minutes every 2 or 3 hours.  Try a warm shower in place of one session with the heating pad. · You can also try an ice pack for 10 to 15 minutes every 2 to 3 hours. Put a thin cloth between the ice pack and your skin. · Take pain medicines exactly as directed. ? If the doctor gave you a prescription medicine for pain, take it as prescribed. ? If you are not taking a prescription pain medicine, ask your doctor if you can take an over-the-counter medicine. · Take short walks several times a day. You can start with 5 to 10 minutes, 3 or 4 times a day, and work up to longer walks. Walk on level surfaces and avoid hills and stairs until your back is better. · Return to work and other activities as soon as you can. Continued rest without activity is usually not good for your back. · To prevent future back pain, do exercises to stretch and strengthen your back and stomach. Learn how to use good posture, safe lifting techniques, and proper body mechanics. When should you call for help? Call your doctor now or seek immediate medical care if:    · You have new or worsening numbness in your legs.     · You have new or worsening weakness in your legs. (This could make it hard to stand up.)     · You lose control of your bladder or bowels.    Watch closely for changes in your health, and be sure to contact your doctor if:    · You have a fever, lose weight, or don't feel well.     · You do not get better as expected. Where can you learn more? Go to http://manisha-anne marie.info/. Enter O782 in the search box to learn more about \"Back Pain: Care Instructions. \"  Current as of: September 20, 2018  Content Version: 12.1  © 9473-5159 [employer]. Care instructions adapted under license by [employer] (which disclaims liability or warranty for this information).  If you have questions about a medical condition or this instruction, always ask your healthcare professional. [address] any warranty or liability for your use of this information. Counseling and Coordination: note routed to Dr. Anabel Whitehead / TREATMENT PREFERENCES:   [====Goals of Care====]  GOALS OF CARE:  Patient / health care proxy stated goals: See Patient Instructions / Summary    TREATMENT PREFERENCES:   Code Status:  [x] Attempt Resuscitation       [] Do Not Attempt Resuscitation    Advance Care Planning:  [x] The Pall Med Interdisciplinary Team has updated the ACP Navigator with Decision Maker and Patient Capacity      Primary Decision Maker: Hiren Settler - Father - [phone]    Secondary Decision Maker: Sherrill Mosquera - Other Relative - [phone]  [] Named in a scanned document   [x] Legal Next of Kin  [] Guardian    Other:  (If patient appropriate for POST, consider using PALLPOST smart phrase here)    The palliative care team has discussed with patient / health care proxy about goals of care / treatment preferences for patient.  [====Goals of Care====]     PRESCRIPTIONS GIVEN:     Medications Ordered Today   Medications    ALPRAZolam (XANAX) 1 mg tablet     Sig: Take 1 Tab by mouth three (3) times daily as needed for Anxiety for up to 30 days. Max Daily Amount: 3 mg.      Dispense:  90 Tab     Refill:  0           FOLLOW UP:     Future Appointments   Date Time Provider Dahlia Epps   7/25/2019  8:30 AM Get Yoder [facility]   7/25/2019 10:30 AM SS INF7 CH3 <1H [facility]   7/29/2019  2:40 PM Jeromy Amador MD [address] [place] [address]   7/30/2019  9:00 AM Kim Coburn [facility]   8/1/2019  3:00 PM Melisa Jones [facility]   8/6/2019 11:00 AM Melias Jones [facility]   8/8/2019 11:00 AM Melisa Jones [facility]   8/13/2019 11:00 AM Melisa Jones [facility]   8/15/2019 11:00 AM Robert Coburn [facility]   8/20/2019 11:00 AM Brianna Overton [facility]   8/22/2019 11:00 AM Melisa Jones [facility]   9/5/2019 10:00 AM SS INF4 CH4 <1H [facility]   9/5/2019 10:45 AM Robinett, Audery Canavan, NP [facility]   9/5/2019 12:30 PM Jones Montalvo MD [address]   10/17/2019 10:00 AM SS INF7 CH3 <1H [facility]           PHYSICIANS INVOLVED IN CARE:   Patient Care Team:  None as PCP - Erich Lopez MD (Hematology and Oncology)  Rancho Perez MD as Physician (Palliative Medicine)  Rancho Perez MD as Physician (Palliative Medicine)       HISTORY:   Reviewed patient-completed ESAS and advance care planning form. Reviewed patient record in prescription monitoring program.    CHIEF COMPLAINT:   Chief Complaint   Patient presents with    Back Pain       HPI/SUBJECTIVE:    The patient is: [x] Verbal / [] Nonverbal     He's finished the oxycodone taper. He still has back pain, on the left side, in a square area, which doesn't go anywhere else. He doesn't have groin pain anymore. He's had no leg weakness or change in his bowel habits. He's started physical therapy, going twice a week. He has exercises to do, tries to keep up with them. He's going to the gym, usually every Sunday to do the butterfly machine and the cardio machines. He's not walking much these days. He's really worried about his father. He just found out that he has prostate cancer. He's been denied for disability for the 2nd time. He's appealing again. He has no money which is very stressful. His appetite is good. His weight hasn't changed. He hasn't had any chest pressure or tightness lately (normal stress test 5/2019). He had a \"skipped beat\" a few weeks ago. He goes back to see his cardiologist in the next few weeks.       Clinical Pain Assessment (nonverbal scale for nonverbal patients):   [++++ Clinical Pain Assessment++++]  [++++Pain Severity++++]: Pain: 9  [++++Pain Character++++]: stabbing pain in back  [++++Pain Duration++++]: months for back pain, weeks for groin pain  [++++Pain Effect++++]: little  [++++Pain Factors++++]: oxycodone helps with back pain, groin pain elicited by standing and walking  [++++Pain Frequency++++]: constant back pain with varying intensity  [++++Pain Location++++]: left lower back  [++++ Clinical Pain Assessment++++]       FUNCTIONAL ASSESSMENT:     Palliative Performance Scale (PPS):  PPS: 80       PSYCHOSOCIAL/SPIRITUAL SCREENING:     Any spiritual / Restorationism concerns:  [] Yes /  [x] No    Caregiver Burnout:  [] Yes /  [x] No /  [] No Caregiver Present      Anticipatory grief assessment:   [x] Normal  / [] Maladaptive       ESAS Anxiety: Anxiety: 10    ESAS Depression: Depression: 0       REVIEW OF SYSTEMS:     The following systems were [x] reviewed / [] unable to be reviewed  Systems: constitutional, ears/nose/mouth/throat, respiratory, gastrointestinal, genitourinary, musculoskeletal, integumentary, neurologic, psychiatric, endocrine. Positive findings noted below. Modified ESAS Completed by: provider   Fatigue: 5 Drowsiness: 0   Depression: 0 Pain: 9   Anxiety: 10 Nausea: 0   Anorexia: 0 Dyspnea: 0   Best Well-Bein Constipation: No   Other Problem (Comment): 0          PHYSICAL EXAM:     Wt Readings from Last 3 Encounters:   19 156 lb 3.2 oz (70.9 kg)   19 153 lb (69.4 kg)   19 154 lb (69.9 kg)     Blood pressure (!) 154/105, pulse 89, temperature 98.1 °F (36.7 °C), temperature source Oral, resp. rate 16, height 5' 7\" (1.702 m), weight 156 lb 3.2 oz (70.9 kg), SpO2 96 %.   Last bowel movement: See Nursing Note    Constitutional: sitting in chair, appears rested  Eyes: pupils equal, anicteric  ENMT: no nasal discharge, moist mucous membranes  Cardiovascular: regular rhythm, no peripheral edema  Respiratory: breathing not labored, symmetric  Gastrointestinal: soft non-tender, +bowel sounds  Musculoskeletal: no deformity, no tenderness to palpation  Skin: warm, dry  Neurologic: following commands, moving all extremities, normal gait  Psychiatric: full affect, no hallucinations  Other:       HISTORY:     Past Medical History:   Diagnosis Date    Anxiety     Cancer [facility])     lymphoma Nov 2018 receiving chemo    Chronic pain     lower back- lymphoma    Hyperlipidemia     Hypertension     Lymphadenopathy 11/12/2018      Past Surgical History:   Procedure Laterality Date    HX HEART CATHETERIZATION  02/2019    HX OTHER SURGICAL  02/2019    cardiac stent    IR INJECTION PSEUDOANEURYSM  2/26/2019      Family History   Problem Relation Age of Onset    Hypertension Father     Diabetes Father     Diabetes Mother       History reviewed, no pertinent family history. Social History     Tobacco Use    Smoking status: Current Every Day Smoker     Packs/day: 1.00     Years: 20.00     Pack years: 20.00    Smokeless tobacco: Never Used   Substance Use Topics    Alcohol use: No     Frequency: Never     No Known Allergies   Current Outpatient Medications   Medication Sig    [START ON 8/19/2019] ALPRAZolam (XANAX) 1 mg tablet Take 1 Tab by mouth three (3) times daily as needed for Anxiety for up to 30 days. Max Daily Amount: 3 mg.  escitalopram oxalate (LEXAPRO) 10 mg tablet Take 1 Tab by mouth daily.  atorvastatin (LIPITOR) 40 mg tablet Take 1 Tab by mouth nightly. Indications: treatment to slow progression of coronary artery disease    clopidogrel (PLAVIX) 75 mg tab 1 Tab by Per NG tube route daily.  lisinopril (PRINIVIL, ZESTRIL) 10 mg tablet Take 1 Tab by mouth daily. DO NOT TAKE for 5 days    L. acidoph & paracasei- S therm- Bifido (AUSTIN-Q/RISAQUAD) 8 billion cell cap cap Take 1 Cap by mouth daily.  aspirin delayed-release (ASPIR-81) 81 mg tablet Take 1 Tab by mouth daily.  varenicline (CHANTIX STARTER RUDY) 0.5 mg (11)- 1 mg (42) DsPk On Days 1 to 3 take 0.5 mg once daily. Then on Days 4 to 7 take 0.5 mg twice daily. On Day 8 and forward take 1 mg twice daily for 11 weeks.     metoprolol succinate (TOPROL-XL) 25 mg XL tablet Take 1 Tab by mouth daily.    magic mouthwash solution Take 15-30 mL by mouth four (4) times daily. Magic mouth wash   Maalox  Lidocaine 2% viscous   Diphenhydramine oral solution    Swish and spit for mouth pain, ok to swallow for throat pain     Pharmacy to mix equal portions of ingredients to a total volume as indicated in the dispense amount.  prochlorperazine (COMPAZINE) 10 mg tablet Take 1 Tab by mouth every six (6) hours as needed.  senna-docusate (PERICOLACE) 8.6-50 mg per tablet Take 1 Tab by mouth daily.  ondansetron hcl (ZOFRAN) 8 mg tablet Take 1 Tab by mouth every eight (8) hours as needed for Nausea. (Patient not taking: Reported on 4/29/2019)    naloxone [facility]) 4 mg/actuation nasal spray Use 1 spray intranasally, then discard. Repeat with new spray every 2 min as needed for opioid overdose symptoms, alternating nostrils. (Patient not taking: Reported on 4/29/2019)     No current facility-administered medications for this visit. LAB DATA REVIEWED:     Lab Results   Component Value Date/Time    WBC 7.6 06/13/2019 09:59 AM    HGB 14.6 06/13/2019 09:59 AM    PLATELET 198 06/97/6240 09:59 AM     Lab Results   Component Value Date/Time    Sodium 139 06/13/2019 09:59 AM    Potassium 3.5 06/13/2019 09:59 AM    Chloride 108 06/13/2019 09:59 AM    CO2 26 06/13/2019 09:59 AM    BUN 7 06/13/2019 09:59 AM    Creatinine 0.98 06/13/2019 09:59 AM    Calcium 9.1 06/13/2019 09:59 AM    Magnesium 1.7 01/12/2019 04:05 AM    Phosphorus 2.0 (L) 01/12/2019 04:05 AM      Lab Results   Component Value Date/Time    AST (SGOT) 17 06/13/2019 09:59 AM    Alk.  phosphatase 162 (H) 06/13/2019 09:59 AM    Protein, total 6.6 06/13/2019 09:59 AM    Albumin 3.7 06/13/2019 09:59 AM    Globulin 2.9 06/13/2019 09:59 AM     Lab Results   Component Value Date/Time    INR 1.1 02/22/2019 08:18 PM    Prothrombin time 10.8 02/22/2019 08:18 PM    aPTT 27.8 02/22/2019 08:18 PM      No results found for: IRON, FE, TIBC, IBCT, PSAT, FERR CONTROLLED SUBSTANCES SAFETY ASSESSMENT (IF ON CONTROLLED SUBSTANCES):     Reviewed opioid safety handout:  [x] Yes   [] No  24 hour opioid dose >150mg morphine equivalent/day:  [] Yes   [x] No  Benzodiazepines:  [x] Yes   [] No  Sleep apnea:  [] Yes   [x] No  Urine Toxicology Testing within last 6 months:  [] Yes   [] No  History of or new aberrant medication taking behaviors:  [] Yes   [] No  Has Narcan been prescribed [] Yes   [x] No          Total time:   Counseling / coordination time:   > 50% counseling / coordination?:

## 2019-07-25 ENCOUNTER — HOSPITAL ENCOUNTER (OUTPATIENT)
Dept: INFUSION THERAPY | Age: 44
Discharge: HOME | End: 2019-07-25
Payer: COMMERCIAL

## 2019-07-25 ENCOUNTER — HOSPITAL ENCOUNTER (OUTPATIENT)
Dept: PHYSICAL THERAPY | Age: 44
Discharge: HOME | End: 2019-07-25
Payer: COMMERCIAL

## 2019-07-25 VITALS
RESPIRATION RATE: 18 BRPM | HEART RATE: 73 BPM | TEMPERATURE: 98 F | DIASTOLIC BLOOD PRESSURE: 80 MMHG | SYSTOLIC BLOOD PRESSURE: 131 MMHG | OXYGEN SATURATION: 98 %

## 2019-07-25 DIAGNOSIS — C82.90 FOLLICULAR LYMPHOMA, UNSPECIFIED FOLLICULAR LYMPHOMA TYPE, UNSPECIFIED BODY REGION (HCC): Primary | ICD-10-CM

## 2019-07-25 PROCEDURE — 74011250636 HC RX REV CODE- 250/636: Performed by: NURSE PRACTITIONER

## 2019-07-25 PROCEDURE — 97112 NEUROMUSCULAR REEDUCATION: CPT

## 2019-07-25 PROCEDURE — 96523 IRRIG DRUG DELIVERY DEVICE: CPT

## 2019-07-25 PROCEDURE — 77030012965 HC NDL HUBR BBMI -A

## 2019-07-25 PROCEDURE — 97140 MANUAL THERAPY 1/> REGIONS: CPT

## 2019-07-25 PROCEDURE — 97110 THERAPEUTIC EXERCISES: CPT

## 2019-07-25 RX ORDER — SODIUM CHLORIDE 0.9 % (FLUSH) 0.9 %
5-10 SYRINGE (ML) INJECTION AS NEEDED
Status: ACTIVE | OUTPATIENT
Start: 2019-07-25 | End: 2019-07-25

## 2019-07-25 RX ORDER — HEPARIN 100 UNIT/ML
500 SYRINGE INTRAVENOUS AS NEEDED
Status: ACTIVE | OUTPATIENT
Start: 2019-07-25 | End: 2019-07-25

## 2019-07-25 RX ORDER — SODIUM CHLORIDE 9 MG/ML
10 INJECTION INTRAMUSCULAR; INTRAVENOUS; SUBCUTANEOUS AS NEEDED
Status: ACTIVE | OUTPATIENT
Start: 2019-07-25 | End: 2019-07-25

## 2019-07-25 RX ADMIN — Medication 500 UNITS: at 10:41

## 2019-07-25 RX ADMIN — SODIUM CHLORIDE 10 ML: 9 INJECTION INTRAMUSCULAR; INTRAVENOUS; SUBCUTANEOUS at 10:38

## 2019-07-25 RX ADMIN — SODIUM CHLORIDE 10 ML: 9 INJECTION INTRAMUSCULAR; INTRAVENOUS; SUBCUTANEOUS at 10:40

## 2019-07-25 NOTE — PROGRESS NOTES
PT DAILY TREATMENT NOTE 2-15    Patient Name: Cynthia Mayberry Sr  Date:2019  : 1975  [x]  Patient  Verified  Payor: Omar 22 / Plan: [id number] / Product Type: Managed Care Medicaid /    In time:8:30a  Out time:9:30a  Total Treatment Time (min): 60  Visit #:  2    Treatment Area: Low back pain [M54.5]    SUBJECTIVE  Pain Level (0-10 scale): 7  Any medication changes, allergies to medications, adverse drug reactions, diagnosis change, or new procedure performed?: [x] No    [] Yes (see summary sheet for update)  Subjective functional status/changes:   [] No changes reported  Patient reports compliance with HEP, but does not get any relief with exercises at this point. Patient reports nothing seems to help relieve his pain right now and nothing he does makes it worse.      OBJECTIVE        30 min Therapeutic Exercise:  [x] See flow sheet :   Rationale: increase ROM, increase strength, improve coordination, improve balance and increase proprioception to improve the patients ability to sit, stand, ambulate and complete ADL's     10 min Neuromuscular Re-education:  [x]  See flow sheet :   Rationale: increase ROM, increase strength, improve coordination, improve balance and increase proprioception  to improve the patients ability to sit, stand, ambulate and complete ADL's    20 min Manual Therapy:  PROM hip flexion, ER/IR, passive glut and piriformis stretch, MET to correct L up-slipped and posteriorly rotated innominate x2   Rationale: decrease pain, increase ROM and increase tissue extensibility  to improve the patients ability to sit, stand, ambulate and complete ADL's          With   [] TE   [] TA   [] neuro   [] other: Patient Education: [x] Review HEP    [] Progressed/Changed HEP based on:   [] positioning   [] body mechanics   [] transfers   [] heat/ice application    [] other:      Other Objective/Functional Measures: increased pain with PPT     Pain Level (0-10 scale) post treatment: 7    ASSESSMENT/Changes in Function:   Patient with poor overall tolerance to interventions focused on AROM, increased tissue extensibility and hip/trunk stabilization. Patient instructed in breathing coordination with interventions to prevent valsalva. Patient required mod verbal cues for pain free ROM. Patient will continue to benefit from skilled PT services to modify and progress therapeutic interventions, address functional mobility deficits, address ROM deficits, address strength deficits, analyze and address soft tissue restrictions, analyze and cue movement patterns, analyze and modify body mechanics/ergonomics and assess and modify postural abnormalities to attain remaining goals. []  See Plan of Care  []  See progress note/recertification  []  See Discharge Summary         Progress towards goals / Updated goals:  Patient with poor overall tolerance to exercises this visit.     PLAN  []  Upgrade activities as tolerated     [x]  Continue plan of care  []  Update interventions per flow sheet       []  Discharge due to:_  []  Other:_      Vanetta Mcardle 7/25/2019

## 2019-07-25 NOTE — PROGRESS NOTES
[facility] Short Visit Progress Note    Patient admitted to [facility] for port flush  ambulatory in stable condition. Assessment completed. No new concerns voiced. Vital Signs:  Visit Vitals  /80   Pulse 73   Temp 98 °F (36.7 °C)   Resp 18   SpO2 98%         Right chest wall port accessed  with positive blood return. Medications:  Medications Administered     heparin (porcine) pf 500 Units     Admin Date  07/25/2019 Action  Given Dose  500 Units Route  IntraVENous Administered By  Sofie Mcmahan RN          sodium chloride 0.9% injection 10 mL     Admin Date  07/25/2019 Action  Given Dose  10 mL Route  IntraVENous Administered By  Sofie Mcmahan RN           Admin Date  07/25/2019 Action  Given Dose  10 mL Route  IntraVENous Administered By  Sofie Mcmahan, ADA                Patient tolerated treatment well. Patient discharged from [place] [address] ambulatory in no distress. Port de-accessed and heparinized. Patient aware of next appointment 9/5/19.     Future Appointments   Date Time Provider Dahlia Aguileraisti   7/29/2019  2:40 PM Niki Amador MD [address] [place] [address]   7/30/2019  9:00 AM Brennen VILLANUEVA [facility]   8/1/2019  3:00 PM [address]   8/6/2019 11:00 AM Bobby Jones [facility]   8/8/2019 11:00 AM Bobby Jones [facility]   8/13/2019 11:00 AM Bobby Jones [facility]   8/15/2019 11:00 AM [facility]   8/20/2019 11:00 AM Arun Yao [facility]   8/22/2019 11:00 AM Bobby Jones [facility]   9/5/2019 10:00 AM SS INF4 CH4 <1H [facility]   9/5/2019 10:45 AM Kendall Blake NP [facility]   9/5/2019 12:30 PM Boothe, Charlaine Dakins, MD [address]   10/17/2019 10:00 AM SS INF7 CH3 <1H [facility] Carlie Baig RN

## 2019-07-30 ENCOUNTER — HOSPITAL ENCOUNTER (OUTPATIENT)
Dept: PHYSICAL THERAPY | Age: 44
Discharge: HOME | End: 2019-07-30
Payer: COMMERCIAL

## 2019-07-30 PROCEDURE — 97110 THERAPEUTIC EXERCISES: CPT

## 2019-07-30 PROCEDURE — 97112 NEUROMUSCULAR REEDUCATION: CPT

## 2019-07-30 NOTE — PROGRESS NOTES
PT DAILY TREATMENT NOTE 2-15    Patient Name: Crystal Marcos  Date:2019  : 1975  [x]  Patient  Verified  Payor: Omar 22 / Plan: [id number] / Product Type: Managed Care Medicaid /    In time 9:00a  Out time:10:00a  Total Treatment Time (min): 60  Visit #:  3    Treatment Area: Low back pain [M54.5]    SUBJECTIVE  Pain Level (0-10 scale): 8  Any medication changes, allergies to medications, adverse drug reactions, diagnosis change, or new procedure performed?: [x] No    [] Yes (see summary sheet for update)  Subjective functional status/changes:   [] No changes reported  Patient reports no changes in pain. Patient states he is able to relax and feel stretch with the exercises, but it does not help the pain. OBJECTIVE        30 min Therapeutic Exercise:  [x] See flow sheet :   Rationale: increase ROM, increase strength, improve coordination, improve balance and increase proprioception to improve the patients ability to sit, stand, ambulate and complete ADL's     30 min Neuromuscular Re-education:  [x]  See flow sheet : time spent with breath coordination   Rationale: increase ROM, increase strength, improve coordination, improve balance and increase proprioception  to improve the patients ability to sit, stand, ambulate and complete ADL's              With   [] TE   [] TA   [] neuro   [] other: Patient Education: [x] Review HEP    [] Progressed/Changed HEP based on:   [] positioning   [] body mechanics   [] transfers   [] heat/ice application    [] other:      Other Objective/Functional Measures:     Pain Level (0-10 scale) post treatment: 8    ASSESSMENT/Changes in Function: Patient required max verbal and tactile cues for breathing techniques with exercises with improvement seen by end of session, however continues to have no changes in pain levels.      Patient will continue to benefit from skilled PT services to modify and progress therapeutic interventions, address functional mobility deficits, address ROM deficits, address strength deficits, analyze and address soft tissue restrictions, analyze and cue movement patterns, analyze and modify body mechanics/ergonomics and assess and modify postural abnormalities to attain remaining goals. []  See Plan of Care  []  See progress note/recertification  []  See Discharge Summary         Progress towards goals / Updated goals:  Patient able to tolerate exercises with no changes in pain reported.      PLAN  []  Upgrade activities as tolerated     [x]  Continue plan of care  []  Update interventions per flow sheet       []  Discharge due to:_  []  Other:_      Corrina Patterson 7/30/2019

## 2019-08-01 ENCOUNTER — HOSPITAL ENCOUNTER (OUTPATIENT)
Dept: PHYSICAL THERAPY | Age: 44
Discharge: HOME | End: 2019-08-01
Payer: COMMERCIAL

## 2019-08-01 PROCEDURE — 97112 NEUROMUSCULAR REEDUCATION: CPT | Performed by: PHYSICAL THERAPIST

## 2019-08-01 PROCEDURE — 97110 THERAPEUTIC EXERCISES: CPT | Performed by: PHYSICAL THERAPIST

## 2019-08-01 NOTE — PROGRESS NOTES
PT DAILY TREATMENT NOTE 2-15    Patient Name: Rashi Banda  Date:2019  : 1975  [x]  Patient  Verified  Payor: Omar 22 / Plan: [id number] / Product Type: Managed Care Medicaid /    In time 3:00  Out time:4:00  Total Treatment Time (min): 60  Visit #:  4    Treatment Area: Low back pain [M54.5]    SUBJECTIVE    Pain Level (0-10 scale): 8  Any medication changes, allergies to medications, adverse drug reactions, diagnosis change, or new procedure performed?: [x] No    [] Yes (see summary sheet for update)  Subjective functional status/changes:   [] No changes reported  Pt reports that he is feeling like he is able to move \"better and more\"    OBJECTIVE        30 min Therapeutic Exercise:  [x] See flow sheet :   Rationale: increase ROM, increase strength, improve coordination, improve balance and increase proprioception to improve the patients ability to sit, stand, ambulate and complete ADL's     30 min Neuromuscular Re-education:  [x]  See flow sheet : time spent with breath coordination   Rationale: increase ROM, increase strength, improve coordination, improve balance and increase proprioception  to improve the patients ability to sit, stand, ambulate and complete ADL's              With   [] TE   [] TA   [] neuro   [] other: Patient Education: [x] Review HEP    [x] Progressed/Changed HEP based on: subjective and objective assessments  [] positioning   [] body mechanics   [] transfers   [] heat/ice application    [] other:      Other Objective/Functional Measures:     Pain Level (0-10 scale) post treatment: 8    ASSESSMENT/Changes in Function: Pt continues to have left sided low back pain and has poor tolerance to core/spine motion exercises. Pt was able to go through home exercises with modifications.         Patient will continue to benefit from skilled PT services to modify and progress therapeutic interventions, address functional mobility deficits, address ROM deficits, address strength deficits, analyze and address soft tissue restrictions, analyze and cue movement patterns, analyze and modify body mechanics/ergonomics and assess and modify postural abnormalities to attain remaining goals.      []  See Plan of Care  []  See progress note/recertification  []  See Discharge Summary         Progress towards goals / Updated goals:    PLAN  [x]  Upgrade activities as tolerated     [x]  Continue plan of care  []  Update interventions per flow sheet       []  Discharge due to:_  []  Other:_      Ganesh MCCONNELL, LATONIA 8/1/2019

## 2019-08-06 ENCOUNTER — HOSPITAL ENCOUNTER (OUTPATIENT)
Dept: PHYSICAL THERAPY | Age: 44
Discharge: HOME | End: 2019-08-06
Payer: COMMERCIAL

## 2019-08-06 PROCEDURE — 97110 THERAPEUTIC EXERCISES: CPT | Performed by: PHYSICAL THERAPIST

## 2019-08-06 PROCEDURE — 97112 NEUROMUSCULAR REEDUCATION: CPT | Performed by: PHYSICAL THERAPIST

## 2019-08-06 NOTE — PROGRESS NOTES
[facility]  [address] [facility] [place]  Phone: [phone]      Fax:  [phone]    Progress Note    Name: Angelique Roberson Sr   : 1975   MD: Christi Pimentel MD       Treatment Diagnosis: Low back pain [M54.5]  Start of Care: 2019    Visits from Start of Care: 5  Missed Visits: 0    Summary of Care:Pt has been receiving PT interventions to increase endurance and decrease LBP. He has achieved all short term goals but has little progress towards completion of long term goals below. Short Term Goals:   1) Pt will be Independent with skin care routine to decrease risk of infection. MET  2) Pt will verbalize home modifications to be performed to decrease fall risk. MET  3) Pt will know which signs and symptoms to report immediately to physician concerning their condition. MET     Long Term Goals:   1) Pt will perform supine to and from sit and sit to and from standing Independently and without arm use  in order to increase safe mobility NOT MET  2) Pt will be Independent with HEP for mobility, balance, and motion/strengthening exercises in order to maintain gains achieved in therapyPROGRESSING. 3) Pt will ambulate with appropriate a.d Independently x 350 ft with no LOB in order to decrease fall risk and increase mobility. PROGRESSING  4) Pt will reduce number (2) and length (3 mins or <)  of rest breaks needed for performance of HEP in order to decrease fatigue and increase functional mobility. NOT MET  5) Pt will have increased spine and LE motion by 30-40% in order to decrease pain and increase mobility NOT MET    Assessment / Recommendations:     Hold therapy and await physician's recommendations. Please advise. Mora MCCONNELL, CLT-MARGUERITE 2019    ________________________________________________________________________  NOTE TO PHYSICIAN:  Please complete the following and fax to:   [facility]: [phone]  . Retain this original for your records. If you are unable to process this request in 24 hours, please contact our office.        ____ I have read the above report and request that my patient continue therapy with the following changes/special instructions:  ____ I have read the above report and request that my patient be discharged from therapy    Physician's Signature:_________________ Date:___________Time:__________

## 2019-08-06 NOTE — PROGRESS NOTES
PT DAILY TREATMENT NOTE 2-15    Patient Name: Nusrat Erickson  Date:2019  : 1975  [x]  Patient  Verified  Payor: Omar 22 / Plan: [id number] / Product Type: Managed Care Medicaid /    In time 11:00  Out time:11:45  Total Treatment Time (min): 45  Visit #:  5    Treatment Area: Low back pain [M54.5]    SUBJECTIVE    Pain Level (0-10 scale): 7  Any medication changes, allergies to medications, adverse drug reactions, diagnosis change, or new procedure performed?: [x] No    [] Yes (see summary sheet for update)  Subjective functional status/changes:   [] No changes reported  Pt reports that he did not sleep well last night and felt \"more sore\" after last treatment    OBJECTIVE        30 min Therapeutic Exercise:  [x] See flow sheet :   Rationale: increase ROM, increase strength, improve coordination, improve balance and increase proprioception to improve the patients ability to sit, stand, ambulate and complete ADL's     15 min Neuromuscular Re-education:  [x]  See flow sheet : time spent with breath coordination   Rationale: increase ROM, increase strength, improve coordination, improve balance and increase proprioception  to improve the patients ability to sit, stand, ambulate and complete ADL's              With   [] TE   [] TA   [] neuro   [] other: Patient Education: [x] Review HEP    [] Progressed/Changed HEP based on:   [] positioning   [] body mechanics   [] transfers   [] heat/ice application    [] other:      Other Objective/Functional Measures:   Lumbar AROM:                                                                                         R                      L                        Flexion                                           21                      Extension                                      9                        Side Bending                     17                   11                      Rotation                             20 12                            LOWER QUARTER                                  MUSCLE STRENGTH  KEY                                                                       R                      L  0 - No Contraction             L1, L2 Psoas               4                      4-  1 - Trace                            L3 Quads                    4                      4-  2 - Poor                             L4 Tib Ant                    4                      4-  3 - Fair                               L5 EHL                        4                      4  4 - Good                            S1 FHL                        4                      4  5 - Normal                         S2 Hams                     4                      4-                                              Neurological: Reflexes / Sensations: Intact to light touch throughout LE's, normal and = patellar reflexes     LE ROM: L LE hip and knee motions with pain and limited by 40%  Mobility/Gait: Sit to stand=full arm support with pain SLS=0 secs B, Gait: Pt ambulates 100 ft without a.d, decreased speed and WBOS, with decreased shuffling gait and improved heel to toe progression  Palpation: Not tolerated in lumbar spine or extremities  Posture: forward head, rounded shoulders, flexed hips, uncomfortable in seated position and continues to sit with arm support to decrease forces through spine. Breath pattern assessment  Diaphragm: able to perform, not primary  Anterior chest primary   Accessory respiratory muscle use B [place], [place]    Pain Level (0-10 scale) post treatment: 8    ASSESSMENT/Changes in Function: Pt is having little relief of discomfort since beginning PT. His FOTO outcome score has decreased 4 points since initial visit, little improvement in motion, no improvement in strength. Pt ambulates with improved heel to toe progression and less shuffling. Pt did tolerate added treadmill and wall mini squats with no increase in pain. See progress note. Patient will continue to benefit from skilled PT services to modify and progress therapeutic interventions, address functional mobility deficits, address ROM deficits, address strength deficits, analyze and address soft tissue restrictions, analyze and cue movement patterns, analyze and modify body mechanics/ergonomics and assess and modify postural abnormalities to attain remaining goals. []  See Plan of Care  [x]  See progress note/recertification  []  See Discharge Summary         Progress towards goals / Updated goals:  Patient able to tolerate exercises with no changes in pain reported.      PLAN  []  Upgrade activities as tolerated     []  Continue plan of care  []  Update interventions per flow sheet       []  Discharge due to:_  [x]  Other: Await physician's decision as to continuation of PT      [address] [facility] 8/6/2019

## 2019-08-12 ENCOUNTER — TELEPHONE (OUTPATIENT)
Dept: ONCOLOGY | Age: 44
End: 2019-08-12

## 2019-08-12 NOTE — TELEPHONE ENCOUNTER
08/12/19 4:02 PM Called to verify if patient still smoking, said he was smoking a pack a day but still taking chantix. Advised I will prescribe 2 weeks worth but if patient does not cut back then will discontinue the medication and have to try another therapy. Patient verbalized understanding, no questions.

## 2019-08-13 ENCOUNTER — HOSPITAL ENCOUNTER (OUTPATIENT)
Dept: PHYSICAL THERAPY | Age: 44
Discharge: HOME | End: 2019-08-13
Payer: COMMERCIAL

## 2019-08-13 PROCEDURE — 97112 NEUROMUSCULAR REEDUCATION: CPT | Performed by: PHYSICAL THERAPIST

## 2019-08-13 PROCEDURE — 97110 THERAPEUTIC EXERCISES: CPT | Performed by: PHYSICAL THERAPIST

## 2019-08-13 NOTE — PROGRESS NOTES
PT DAILY TREATMENT NOTE 2-15    Patient Name: Bushra Lunbderg Sr  Date:2019  : 1975  [x]  Patient  Verified  Payor: Omar 22 / Plan: [id number] / Product Type: Managed Care Medicaid /    In time 11:00  Out time:11:45  Total Treatment Time (min): 45  Visit #:  6    Treatment Area: Low back pain [M54.5]    SUBJECTIVE    Pain Level (0-10 scale): 8  Any medication changes, allergies to medications, adverse drug reactions, diagnosis change, or new procedure performed?: [x] No    [] Yes (see summary sheet for update)  Subjective functional status/changes:   [x] No changes reported  Pt reports that he does not feel PT is working to reduce pain    OBJECTIVE        30 min Therapeutic Exercise:  [x] See flow sheet :   Rationale: increase ROM, increase strength, improve coordination, improve balance and increase proprioception to improve the patients ability to sit, stand, ambulate and complete ADL's     15 min Neuromuscular Re-education:  [x]  See flow sheet : time spent with breath coordination   Rationale: increase ROM, increase strength, improve coordination, improve balance and increase proprioception  to improve the patients ability to sit, stand, ambulate and complete ADL's              With   [] TE   [] TA   [] neuro   [] other: Patient Education: [x] Review HEP    [] Progressed/Changed HEP based on:   [] positioning   [] body mechanics   [] transfers   [] heat/ice application    [] other:        Pain Level (0-10 scale) post treatment: 8    ASSESSMENT/Changes in Function:  Due to lack of appreciable progress towards rehab goals, D/C to HEP      Patient will continue to benefit from skilled PT services to modify and progress therapeutic interventions, address functional mobility deficits, address ROM deficits, address strength deficits, analyze and address soft tissue restrictions, analyze and cue movement patterns, analyze and modify body mechanics/ergonomics and assess and modify postural abnormalities to attain remaining goals.      []  See Plan of Care  []  See progress note/recertification  [x]  See Discharge Summary         Progress towards goals / Updated goals:  See D/C summary    PLAN  []  Upgrade activities as tolerated     []  Continue plan of care  []  Update interventions per flow sheet       [x]  Discharge due to: lack of appreciable progress towards goal.  []  Other:    [address] MSPT, CLT-MARGUERITE 8/13/2019

## 2019-08-13 NOTE — PROGRESS NOTES
[address] [facility] [address]  Phone: [phone]  Fax: [phone]    Discharge Summary  2-15    Patient name: Chapis Marie Sr  : 1975  Provider#: [id number]  Referral source: Keshia Black MD      Medical/Treatment Diagnosis: Low back pain [M54.5]     Prior Hospitalization: see medical history     Comorbidities: See Plan of Care  Prior Level of Function:See Plan of Care  Medications: Verified on Patient Summary List    Start of Care:       Onset Date:2018   Visits from Start of Care: 6     Missed Visits: 1  Reporting Period : 2019 to 2019      ASSESSMENT/SUMMARY OF CARE: Pt did not complete all below goals and had little reduction in pain after 6 visits. He is Independent with his home exercise program. D/C to HEP    Short Term Goals:   1) Pt will be Independent with skin care routine to decrease risk of infection. MET  2) Pt will verbalize home modifications to be performed to decrease fall risk. MET  3) Pt will know which signs and symptoms to report immediately to physician concerning their condition. MET     Long Term Goals:   1) Pt will perform supine to and from sit and sit to and from standing Independently and without arm use  in order to increase safe mobility NOT MET  2) Pt will be Independent with HEP for mobility, balance, and motion/strengthening exercises in order to maintain gains achieved in therapy MET  3) Pt will ambulate with appropriate a.d Independently x 350 ft with no LOB in order to decrease fall risk and increase mobility. PROGRESSING  4) Pt will reduce number (2) and length (3 mins or <)  of rest breaks needed for performance of HEP in order to decrease fatigue and increase functional mobility. NOT MET  5) Pt will have increased spine and LE motion by 30-40% in order to decrease pain and increase mobility NOT MET    RECOMMENDATIONS:  [x]Discontinue therapy: []Patient has reached or is progressing toward set goals      []Patient is non-compliant or has abdicated      [x]Due to lack of appreciable progress towards set goals      []Other    232 [facility], [facility] 8/13/2019

## 2019-08-15 ENCOUNTER — HOSPITAL ENCOUNTER (OUTPATIENT)
Dept: PHYSICAL THERAPY | Age: 44
End: 2019-08-15
Payer: COMMERCIAL

## 2019-08-20 ENCOUNTER — APPOINTMENT (OUTPATIENT)
Dept: PHYSICAL THERAPY | Age: 44
End: 2019-08-20
Payer: COMMERCIAL

## 2019-08-22 ENCOUNTER — APPOINTMENT (OUTPATIENT)
Dept: PHYSICAL THERAPY | Age: 44
End: 2019-08-22
Payer: COMMERCIAL

## 2019-08-30 NOTE — PROGRESS NOTES
[id number] [facility] at [address] [place] [address]  [phone]    Hematology / Oncology Established Visit    Reason for Visit:   Lucia Gooden is a 41 y.o. male who comes in for f/u of lymphoma. Hematology Oncology Treatment History:     Diagnosis: Follicular lymphoma    Stage: IV    Pathology:   11/13/18 right inguinal LN excision: Follicular lymphoma, high-grade (grade 3a of 3). Comment   The delaney architecture is entirely effaced by atypical lymphocytic proliferation with prominent nodular growth pattern. The majority of the atypical lymphocytes are small to medium in size and have irregular nuclear outlines and inconspicuous nucleoli, morphologically consistent with centrocytes. Scattered large lymphocytes with prominent nucleoli, consistent with centroblasts are identified (> 15 per high-power field). Focal increase in mitotic figures is noted. Occasional follicular dendritic cells are seen. By immunohistochemistry, the atypical lymphocytes are positive for CD20, PAX5, CD10, BCL6 and BCL2 (weak, focal) and negative for MUM1. CD3, CD5 and CD43 stain numerous background T lymphocytes. Ki-67 reveals a high proliferation index in the neoplastic follicles (overall 93-81%). Clinical history indicates 14 cm retroperitoneal mass with bilateral inguinal lymphadenopathy. In summary, the combined morphologic and phenotypic findings are diagnostic of a high-grade follicular lymphoma (grade 3a of 3). There is no evidence of diffuse large B-cell lymphoma. Flow cytometry analysis:   Monoclonal B-cell population (47 % of all cells) with mild increase in side and forward scatter properties expressing CD19, CD20, CD23 and CD10 with surface lambda light chain restriction. No phenotypically aberrant T-cell population. Flow cytometry was performed at [facility]     Prior Treatment: Obinutuzumab-CHOP.  Obinutuzumab: 1000 mg weekly on days 1, 8, 15 for cycle 1, then 1000 mg on day 1 q21 days for cycles 2-6, then monotherapy 1000 mg every 21 days for cycle 7, 8 with Cyclophosphamide 750mg/m2, Doxorubicin 50mg/m2, Vincristine 1.4mg/m2 on day 1 and Prednisone 100mg on Days 1-5, every 21 days for a total of 2 cycles completed late 1/2019. Regimen discontinued due to NSTEMI. Obinutuzumab + Bendamustine: 1000 mg Obinutuzumab on day 1 + Bendamustine 90mg/m2 on days 1-2 on a 28-day cycle x 4 cycles     Current Treatment: Surveillance      Oncologic History:  He states he was in his usual state of health in early November 2018 when he developed low back pain and presented to the ER. The pain was described as constant, radiating to the buttocks, without exacerbating or alleviating factors, associated w/ increased urination, no n/v/d, no dysuria, no fever, no significant weight loss at first, but he did later report 15-lb loss over 1 month due to low appetite. Work-up at outside hospital revealed a retroperitoneal mass seen on CT imaging, and he was transferred to [facility] for further work-up. CT there showed a large retroperitoneal mass encircling the aorta with invasion of the left renal hilum and left adrenal gland. There were bilateral inguinal lymph nodes and moderate left hydronephrosis. He was evaluated while at [facility] and was noted to have palpable nodes in his groin for approximately the past 1 month. No testicular mass, anorexia, weight loss, fevers, night sweats, chest pain, shortness of breath, abdominal pain or nausea. He underwent excisional LN biopsy of right inguinal LN. He was told that he had likely lymphoma. He was advised to follow up as outpatient for PET scan, bone marrow biopsy. However, patient states he was lost to f/u. He never received any calls or appointment details. His aunt urged patient to seek care elsewhere and his PCP recommended [facility]. At our first meeting on 12/13/18, he tells me that he was working full time, feeling well until early Nov 2018.  When he developed the left lower back pain, he went to [facility]. At time of diagnosis, he had no cardiac disease aside from HTN, hyperlipidemia. However, he did have an NSTEMI after cycle 2 of O-CHOP. Was likely unrelated to chemotherapy, but opted to switch treatment to Obinutuzumab-Bendamustine. He completed treatment in 5/2019 and had a CR based on PET. History of Present Illness:   Mr. Zapata is a 41 y.o. male with HTN who comes in for follow up of Follicular lymphoma, now on surveillance. No fevers, chills, night sweats. Reports mild fatigue, but very busy with family life. He is taking chantix but still smoking 10 cigarettes per day. He has a lot of family stressors in his life including his dad being diagnosed with pancreatic cancer. Reports ongoing intermittent tremors in hands have decreased in severity. Denies dizziness or lightheadedness. Denies recent chest pain. Reports eating and drinking well. He is walking every day. No fevers, chills, sweats, n/v/d, constipation, abdom pain, dysuria, HA, vision changes, rash, CP, SOB. FAMILY HISTORY:  His father has a history of leukemia, currently in remission, treated at [facility]. Now with pancreatic cancer. Past Medical History:   Diagnosis Date    Anxiety     Cancer [facility])     lymphoma Nov 2018 receiving chemo    Chronic pain     lower back- lymphoma    Hyperlipidemia     Hypertension     Lymphadenopathy 11/12/2018      Past Surgical History:   Procedure Laterality Date    HX HEART CATHETERIZATION  02/2019    HX OTHER SURGICAL  02/2019    cardiac stent    IR INJECTION PSEUDOANEURYSM  2/26/2019      Social History     Tobacco Use    Smoking status: Current Every Day Smoker     Packs/day: 1.00     Years: 20.00     Pack years: 20.00    Smokeless tobacco: Never Used   Substance Use Topics    Alcohol use: No     Frequency: Never   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     Family History   Problem Relation Age of Onset    Hypertension Father    [facility] Diabetes Father     Diabetes Mother    Father had leukemia. Current Outpatient Medications   Medication Sig    CHANTIX 1 mg tablet TAKE 1 TABLET BY MOUTH TWICE A DAY    ALPRAZolam (XANAX) 1 mg tablet Take 1 Tab by mouth three (3) times daily as needed for Anxiety for up to 30 days. Max Daily Amount: 3 mg.  escitalopram oxalate (LEXAPRO) 10 mg tablet Take 1 Tab by mouth daily.  atorvastatin (LIPITOR) 40 mg tablet Take 1 Tab by mouth nightly. Indications: treatment to slow progression of coronary artery disease    clopidogrel (PLAVIX) 75 mg tab 1 Tab by Per NG tube route daily.  metoprolol succinate (TOPROL-XL) 25 mg XL tablet Take 1 Tab by mouth daily.  lisinopril (PRINIVIL, ZESTRIL) 10 mg tablet Take 1 Tab by mouth daily. DO NOT TAKE for 5 days    L. acidoph & paracasei- S therm- Bifido (AUSTIN-Q/RISAQUAD) 8 billion cell cap cap Take 1 Cap by mouth daily.  magic mouthwash solution Take 15-30 mL by mouth four (4) times daily. Magic mouth wash   Maalox  Lidocaine 2% viscous   Diphenhydramine oral solution    Swish and spit for mouth pain, ok to swallow for throat pain     Pharmacy to mix equal portions of ingredients to a total volume as indicated in the dispense amount.  prochlorperazine (COMPAZINE) 10 mg tablet Take 1 Tab by mouth every six (6) hours as needed.  senna-docusate (PERICOLACE) 8.6-50 mg per tablet Take 1 Tab by mouth daily.  ondansetron hcl (ZOFRAN) 8 mg tablet Take 1 Tab by mouth every eight (8) hours as needed for Nausea. (Patient not taking: Reported on 4/29/2019)    naloxone [facility]) 4 mg/actuation nasal spray Use 1 spray intranasally, then discard. Repeat with new spray every 2 min as needed for opioid overdose symptoms, alternating nostrils. (Patient not taking: Reported on 4/29/2019)    aspirin delayed-release (ASPIR-81) 81 mg tablet Take 1 Tab by mouth daily. No current facility-administered medications for this visit.        No Known Allergies     Review of Systems: A complete review of systems was obtained, negative except as described above. Physical Exam:     Visit Vitals  BP (!) 154/97 (BP 1 Location: Left arm, BP Patient Position: Sitting)   Pulse 70   Temp 97.9 °F (36.6 °C) (Oral)   Resp 16   Ht 5' 7\" (1.702 m)   Wt 162 lb (73.5 kg)   SpO2 98%   BMI 25.37 kg/m²     ECOG PS: 0  General: Well developed, no acute distress, smells like cigarette smoke  Eyes: PERRLA, EOMI, anicteric sclerae  HENT: Atraumatic, OP clear, poor dentition,TMs intact without erythema  Neck: Supple  Lymphatic: No cervical, supraclavicular, axillary, inguinal lymphadenopathy  Respiratory: CTAB, normal respiratory effort  CV: Normal rate, regular rhythm, no murmurs, no peripheral edema  GI: Soft, nontender, nondistended, no masses, no hepatomegaly, no splenomegaly  MS: Normal gait and station. Digits without clubbing or cyanosis. Skin: No rashes, ecchymoses, or petechiae. Normal temperature, turgor, and texture. Neuro/Psych: Alert, oriented. 5/5 strength in all 4 extremities. Appropriate affect, normal judgment/insight. Results:     Lab Results   Component Value Date/Time    WBC 7.6 06/13/2019 09:59 AM    HGB 14.6 06/13/2019 09:59 AM    HCT 43.2 06/13/2019 09:59 AM    PLATELET 464 22/18/4094 09:59 AM    MCV 91.9 06/13/2019 09:59 AM    ABS.  NEUTROPHILS 5.3 06/13/2019 09:59 AM    Hemoglobin (POC) 15.0 06/05/2009 02:13 PM    Hematocrit (POC) 39 02/14/2019 01:24 PM     Lab Results   Component Value Date/Time    Sodium 139 06/13/2019 09:59 AM    Potassium 3.5 06/13/2019 09:59 AM    Chloride 108 06/13/2019 09:59 AM    CO2 26 06/13/2019 09:59 AM    Glucose 88 06/13/2019 09:59 AM    BUN 7 06/13/2019 09:59 AM    Creatinine 0.98 06/13/2019 09:59 AM    GFR est AA >60 06/13/2019 09:59 AM    GFR est non-AA >60 06/13/2019 09:59 AM    Calcium 9.1 06/13/2019 09:59 AM    Sodium (POC) 136 02/14/2019 01:24 PM    Potassium (POC) 3.9 02/14/2019 01:24 PM    Chloride (POC) 102 02/14/2019 01:24 PM Glucose (POC) 249 (H) 02/15/2019 10:21 PM    BUN (POC) 14 2019 01:24 PM    Creatinine (POC) 0.9 2019 01:24 PM    Calcium, ionized (POC) 1.24 2019 01:24 PM     Lab Results   Component Value Date/Time    Bilirubin, total 0.4 2019 09:59 AM    ALT (SGPT) 33 2019 09:59 AM    AST (SGOT) 17 2019 09:59 AM    Alk. phosphatase 162 (H) 2019 09:59 AM    Protein, total 6.6 2019 09:59 AM    Albumin 3.7 2019 09:59 AM    Globulin 2.9 2019 09:59 AM     No results found for: IRON, FE, TIBC, IBCT, PSAT, FERR    No results found for: B12LT, FOL, RBCF  Lab Results   Component Value Date/Time    TSH 1.53 2016 04:40 AM     18:     Lab Results   Component Value Date/Time    Hepatitis A, IgM NONREACTIVE 2018 04:53 PM    Hepatitis B surface Ag <0.10 2018 04:53 PM    Hepatitis B core, IgM NONREACTIVE 2018 04:53 PM         Imagin/9/18 Abd/pelvis CT: IMPRESSION:  1. Interval development of a large retroperitoneal mass encircling the aorta with invasion of the left renal hilum and left adrenal gland. Several adjacent lymph nodes are seen extending into the peritoneum and underneath the  diaphragmatic natalie. This most likely represents lymphoma. 2. Several new bilateral enlarged inguinal lymph nodes also likely representing lymphoma. 3. Moderate left hydronephrosis with a delayed renal nephrogram related to decreased renal function. This is related to the invasion of the renal hilum. 18 Chest CT: IMPRESSION:  Trace left pleural effusion. Bilateral lower lobe atelectasis. Large  retroperitoneal mass lesion again demonstrated. PET 18:  FINDINGS:  HEAD/NECK: Right palatine tonsil intense hypermetabolism, max SUV 18. Multilevel  bilateral cervical adenopathy, with max SUV 12 in a left supraclavicular node. Cerebral evaluation is limited by normal intense activity.   CHEST: Solitary hypermetabolic left axillary node, max SUV 11.  ABDOMEN/PELVIS: Bulky retroperitoneal mass max SUV 27, with several additional  small active abdomino-pelvic nodes. Bilateral inguinal nodes with max SUV 12 on  the left. SKELETON: No foci of abnormal hypermetabolism in the axial and visualized  appendicular skeleton. IMPRESSION:   1. Right palatine tonsil tumor involvement (Deauville 5). 2. Bilateral cervical delaney involvement (Deauville 5). 3. Left axillary node involvement (Deauville 5). 4. Bulky retroperitoneal lymphoma mass and additional smaller hypermetabolic  abdomino-pelvic nodes (Deauville 5). 5. Bilateral inguinal delaney involvement (Deauville 5). Deauville Five Point Scale  1. No uptake or no residual uptake (when used interim)  2. Slight uptake, but below blood pool (mediastinum)  3. Uptake above mediastinal, but below/equal to uptake in the liver  4. Uptake slightly to moderately higher than liver  5. Markedly increased uptake or any new lesion (on response evaluation)  Each FDG-avid (or previously FDG avid) lesion is rated independently. Reference values:  Mediastinal blood pool: 2.1 SUV  Liver (background): 2.2 SUV    PET/CT 2/05/19:   IMPRESSION:  1. No Foci of Abnormal Hypermetabolism (Deauville 1). 2. Resolved activity in the right palatine tonsil, bilateral cervical nodes,left axillary node, retroperitoneal/abdominal pelvic adenopathy, bilateral inguinal nodes. Echo 2/14/19:  Normal cavity size, wall thickness and systolic function (ejection fraction normal). The muscle mass is normal. The cavity shape is normal. The estimated ejection fraction is 41 - 45%. Abnormal wall motion as described on the wall scoring diagram below. End-systolic volume is normal. Normal left ventricular strain. There is mild (grade 1) left ventricular diastolic dysfunction. Normal left ventricular diastolic pressure.  End-diastolic volume is normal.    LE arterial duplex 2/22/19:  There is evidence of left groin pseudoaneurysm noted arising from distal common femoral artery, pseudo lobe measures 2.32cm x 2.58cm and pseudo neck length measuring 0.63cm. There is no evidence of hemodynamically significant left lower extremity arterial obstruction. JACLYN is 1.03 on the right and 1.02 on the left. LE arterial duplex s/p Thrombin Injection to Pseudoaneurysm 2/26/19:  Successful thrombin injection procedure of the left groin with no further flow seen. No evidence of hemodnyamically significant obstruction in the left lower extremity. Left lower extremity arterial duplex performed. Confirmed pseudoaneurysm in left groin with small neck. Following thrombin injection, no further flow seen in the pseudoaneurysm. The left common femoral, profunda femoral, femoral, popliteal, posterior tibial and anterior arteries were imaged. Mainly triphasic flow was seen with no evidence of significantly elevated velocities. Repeat LE arterial duplex 2/27/19:  Continued thrombosed left groin pseudoaneurysm following thrombin injection on 02/26/2019. No flow or color fill is identified. The hematoma measures approximately 2.1 x 2.9 cm in diameter. The common femoral, deep femoral, femoral, and popliteal arteries are patent with mainly tri-phasic flow and no significant hemodynamically obstruction is noted. Stress 5/31/19:  · Normal stress myocardial perfusion without ischemia or infact at 84% MPHR. Normal LV function. LVEF 60%. · No EKG changes of ischemia at peak exercise. · Normal functional capacity. PET 6/03/19: IMPRESSION: No Foci of Abnormal Hypermetabolism (Deauville 1).      Assessment & Plan:   Marlen Burnham Sr is a 41 y.o. male comes in for evaluation and management of lymphoma. 1. Follicular lymphoma: Grade 3a. Although grade 3a disease is considered more indolent and can be treated like grade 1/2 disease, this patient has indications for treatment: bulky disease encircling the aorta causing symptoms.  Bone marrow negative for lymphoma, but was hypercellular. BR better than RCHOP, but based on GALLIUM study, Obinutuzumab-based induction and maintenance prolongs PFS over that seen with rituximab-based therapy. Therefore, I have chosen to treat patient with O-CHOP regimen for 6 cycles, followed by possible maintenance Obinutuzumab. We discussed the risks and benefits of O-CHOP chemotherapy. The potential side effects include, but are not limited to: nausea, vomiting, diarrhea, constipation, Flu-like symptoms, infusion reaction, allergic reaction, flushing, taste changes, increased risk of infection, anemia, fatigue, alopecia, neuropathy, nail changes, cardiac damage, mucositis, myleosuppression, infertility and rarely, death. Patient completed chemoteaching and received a chemotherapy education folder. CR after 2 cycles of O-CHOP, but switched regiment Bendamustine-O due to cardiac event. Chemotherapy consent signed and patient educated about side effects including: cytopenias, infections, bleeding, n/v/d, hair loss, skin rash, secondary malignancy, kidney or liver toxicity. Completed a total of 4 cycles of Bendamustine-O. PET completed on 6/03/19 shows no foci of abnormal hypermetabolism. -- Surveillance labs, MD visit every 3 months, and imaging based on patient symptoms. -- 6 weeks port flush 10/17/19  -- Follow up in 3 months labs, MD visit on 12/5/19.     2. Use of cardiotoxic chemotherapy: Discussed risks/benefits of using doxorubicin. Echo on 12/17/18 showed EF 65%, but drop to EF 45% immediately after MI. Has seen Dr. Ginny Luo who will follow up again in 1 month. 3. Neoplasm related pain / Anxiety: Left lower back pain. No longer taking Oxycodone 5mg. Signed pain contract on 12/28/18. Counseled on adding SSRI if anxiety becomes worse. Following Dr. Martha Alatorre. -- PT with Kate Caal  -- Ativan 0.5mg bid taking TID  -- Lexapro 10mg daily for anxiety. -- Tylenol 500 mg every 4 hours.       4. HTN / Hyperlipidemia: Well controlled today on BB, ACEI. Elevated on 9/5/19 advised patient to check at home and call with results. 5. Tobacco abuse: Reiterated strong recommendation for smoking cessation in the setting of recent MI. Discussed cessation strategies and pt does not want Wellbutrin given past experience with it. Doing well on Chantix 1mg bid and has cut down to 8 cigs/day. -- Continue cutting down on cigarrettes, continue Chantix. 6. Poor dentition: With dental infection and neutropenic fever at start of treatment requiring IV antibiotics. No abscess. Was evaluated later on by OMFS and had several teeth pulled. -- F/u with OMFS 10/2/2019.     7. H/o STEMI / Cardiac arrest: P/w CP on 2/14/19 followed by collapse and cardiac arrest. Cardiac cath at [facility] by Dr. Taniya Tapia revealed 90-95% occlusion of proximal LAD, TOM placed. Dr. Taniya Tapia [phone]. Dr. Karina Olivas recommends: Continue aspirin and Plavix along with high-dose Lipitor and metoprolol. 5/31/19 Stress test normal with EF 65%. 7/29 appointment with Marjorie, patient went to appointment but stated he was \"forgotten about,\" and will make a follow up this month (September). -- On dual antiplatelet therapy aspirin and clopidogrel  -- On BB, ACEI, statin      8. Tremors: Improving. Emotional well being: Pt is coping well with his/her disease and has excellent support. Met with SW in the past as well as today. I appreciate the opportunity to participate in Mr. Jania Woodall Sr's care.     Signed By: Kitty Olvera MD     September 5, 2019

## 2019-09-03 ENCOUNTER — TELEPHONE (OUTPATIENT)
Dept: PALLATIVE CARE | Age: 44
End: 2019-09-03

## 2019-09-03 NOTE — TELEPHONE ENCOUNTER
Called patient to advise/confirm upcoming appt with Dr. Rozetta Romberg on    9/5/19 at 12:30pm at [facility]. No answer and could not leave voicemail. Calling [facility] Mr. Benny Burks. No answer so left voicemail. Also advised to please bring in your Drivers License and Insurance Card with you to appointment as well as any prescription pain medication in the original container with you to appt.

## 2019-09-05 ENCOUNTER — OFFICE VISIT (OUTPATIENT)
Dept: PALLATIVE CARE | Age: 44
End: 2019-09-05

## 2019-09-05 ENCOUNTER — OFFICE VISIT (OUTPATIENT)
Dept: ONCOLOGY | Age: 44
End: 2019-09-05

## 2019-09-05 ENCOUNTER — HOSPITAL ENCOUNTER (OUTPATIENT)
Dept: INFUSION THERAPY | Age: 44
Discharge: HOME | End: 2019-09-05
Payer: COMMERCIAL

## 2019-09-05 ENCOUNTER — TELEPHONE (OUTPATIENT)
Dept: CARDIOLOGY CLINIC | Age: 44
End: 2019-09-05

## 2019-09-05 VITALS
RESPIRATION RATE: 18 BRPM | HEART RATE: 64 BPM | DIASTOLIC BLOOD PRESSURE: 94 MMHG | TEMPERATURE: 97.8 F | SYSTOLIC BLOOD PRESSURE: 172 MMHG

## 2019-09-05 VITALS
OXYGEN SATURATION: 97 % | BODY MASS INDEX: 25.22 KG/M2 | DIASTOLIC BLOOD PRESSURE: 109 MMHG | RESPIRATION RATE: 20 BRPM | SYSTOLIC BLOOD PRESSURE: 173 MMHG | WEIGHT: 161 LBS | TEMPERATURE: 97.4 F | HEART RATE: 56 BPM

## 2019-09-05 VITALS
HEART RATE: 70 BPM | OXYGEN SATURATION: 98 % | DIASTOLIC BLOOD PRESSURE: 97 MMHG | SYSTOLIC BLOOD PRESSURE: 154 MMHG | BODY MASS INDEX: 25.43 KG/M2 | WEIGHT: 162 LBS | HEIGHT: 67 IN | TEMPERATURE: 97.9 F | RESPIRATION RATE: 16 BRPM

## 2019-09-05 DIAGNOSIS — Z72.0 TOBACCO ABUSE DISORDER: ICD-10-CM

## 2019-09-05 DIAGNOSIS — F41.9 ANXIETY: ICD-10-CM

## 2019-09-05 DIAGNOSIS — I10 ESSENTIAL HYPERTENSION: ICD-10-CM

## 2019-09-05 DIAGNOSIS — R25.1 TREMORS OF NERVOUS SYSTEM: ICD-10-CM

## 2019-09-05 DIAGNOSIS — C82.90 FOLLICULAR LYMPHOMA, UNSPECIFIED FOLLICULAR LYMPHOMA TYPE, UNSPECIFIED BODY REGION (HCC): Primary | ICD-10-CM

## 2019-09-05 DIAGNOSIS — C82.33 FOLLICULAR LYMPHOMA GRADE IIIA OF INTRA-ABDOMINAL LYMPH NODES (HCC): ICD-10-CM

## 2019-09-05 DIAGNOSIS — I25.2 HISTORY OF MI (MYOCARDIAL INFARCTION): ICD-10-CM

## 2019-09-05 DIAGNOSIS — I10 HYPERTENSION, UNSPECIFIED TYPE: ICD-10-CM

## 2019-09-05 DIAGNOSIS — G89.29 CHRONIC LEFT-SIDED LOW BACK PAIN WITHOUT SCIATICA: Primary | ICD-10-CM

## 2019-09-05 DIAGNOSIS — M54.50 CHRONIC LEFT-SIDED LOW BACK PAIN WITHOUT SCIATICA: Primary | ICD-10-CM

## 2019-09-05 DIAGNOSIS — Z85.72 HISTORY OF FOLLICULAR LYMPHOMA: Primary | ICD-10-CM

## 2019-09-05 LAB
ALBUMIN SERPL-MCNC: 3.4 G/DL (ref 3.5–5)
ALBUMIN/GLOB SERPL: 1.1 {RATIO} (ref 1.1–2.2)
ALP SERPL-CCNC: 123 U/L (ref 45–117)
ALT SERPL-CCNC: 27 U/L (ref 12–78)
ANION GAP SERPL CALC-SCNC: 6 MMOL/L (ref 5–15)
AST SERPL-CCNC: 21 U/L (ref 15–37)
BASOPHILS # BLD: 0.1 K/UL (ref 0–0.1)
BASOPHILS NFR BLD: 1 % (ref 0–1)
BILIRUB SERPL-MCNC: 0.3 MG/DL (ref 0.2–1)
BUN SERPL-MCNC: 6 MG/DL (ref 6–20)
BUN/CREAT SERPL: 6 (ref 12–20)
CALCIUM SERPL-MCNC: 8.7 MG/DL (ref 8.5–10.1)
CHLORIDE SERPL-SCNC: 111 MMOL/L (ref 97–108)
CO2 SERPL-SCNC: 27 MMOL/L (ref 21–32)
CREAT SERPL-MCNC: 0.99 MG/DL (ref 0.7–1.3)
DIFFERENTIAL METHOD BLD: ABNORMAL
EOSINOPHIL # BLD: 0.6 K/UL (ref 0–0.4)
EOSINOPHIL NFR BLD: 9 % (ref 0–7)
ERYTHROCYTE [DISTWIDTH] IN BLOOD BY AUTOMATED COUNT: 12.7 % (ref 11.5–14.5)
GLOBULIN SER CALC-MCNC: 3 G/DL (ref 2–4)
GLUCOSE SERPL-MCNC: 133 MG/DL (ref 65–100)
HCT VFR BLD AUTO: 40.9 % (ref 36.6–50.3)
HGB BLD-MCNC: 14.1 G/DL (ref 12.1–17)
IMM GRANULOCYTES # BLD AUTO: 0 K/UL (ref 0–0.04)
IMM GRANULOCYTES NFR BLD AUTO: 1 % (ref 0–0.5)
LDH SERPL L TO P-CCNC: 254 U/L (ref 85–241)
LYMPHOCYTES # BLD: 1.2 K/UL (ref 0.8–3.5)
LYMPHOCYTES NFR BLD: 17 % (ref 12–49)
MCH RBC QN AUTO: 32.8 PG (ref 26–34)
MCHC RBC AUTO-ENTMCNC: 34.5 G/DL (ref 30–36.5)
MCV RBC AUTO: 95.1 FL (ref 80–99)
MONOCYTES # BLD: 0.5 K/UL (ref 0–1)
MONOCYTES NFR BLD: 7 % (ref 5–13)
NEUTS SEG # BLD: 4.8 K/UL (ref 1.8–8)
NEUTS SEG NFR BLD: 65 % (ref 32–75)
NRBC # BLD: 0 K/UL (ref 0–0.01)
NRBC BLD-RTO: 0 PER 100 WBC
PLATELET # BLD AUTO: 218 K/UL (ref 150–400)
PMV BLD AUTO: 11.1 FL (ref 8.9–12.9)
POTASSIUM SERPL-SCNC: 3.1 MMOL/L (ref 3.5–5.1)
PROT SERPL-MCNC: 6.4 G/DL (ref 6.4–8.2)
RBC # BLD AUTO: 4.3 M/UL (ref 4.1–5.7)
SODIUM SERPL-SCNC: 144 MMOL/L (ref 136–145)
WBC # BLD AUTO: 7.2 K/UL (ref 4.1–11.1)

## 2019-09-05 PROCEDURE — 80053 COMPREHEN METABOLIC PANEL: CPT

## 2019-09-05 PROCEDURE — 36591 DRAW BLOOD OFF VENOUS DEVICE: CPT

## 2019-09-05 PROCEDURE — 36415 COLL VENOUS BLD VENIPUNCTURE: CPT

## 2019-09-05 PROCEDURE — 77030013169 SET IV BLD ICUM -A

## 2019-09-05 PROCEDURE — 83615 LACTATE (LD) (LDH) ENZYME: CPT

## 2019-09-05 PROCEDURE — 74011250636 HC RX REV CODE- 250/636: Performed by: INTERNAL MEDICINE

## 2019-09-05 PROCEDURE — 96523 IRRIG DRUG DELIVERY DEVICE: CPT

## 2019-09-05 PROCEDURE — 85025 COMPLETE CBC W/AUTO DIFF WBC: CPT

## 2019-09-05 RX ORDER — GABAPENTIN 300 MG/1
CAPSULE ORAL
Qty: 180 CAP | Refills: 3 | Status: SHIPPED | OUTPATIENT
Start: 2019-09-05 | End: 2019-10-14

## 2019-09-05 RX ORDER — SODIUM CHLORIDE 0.9 % (FLUSH) 0.9 %
5-10 SYRINGE (ML) INJECTION AS NEEDED
Status: ACTIVE | OUTPATIENT
Start: 2019-09-05 | End: 2019-09-05

## 2019-09-05 RX ORDER — HEPARIN 100 UNIT/ML
500 SYRINGE INTRAVENOUS AS NEEDED
Status: CANCELLED | OUTPATIENT
Start: 2019-10-17

## 2019-09-05 RX ORDER — SODIUM CHLORIDE 0.9 % (FLUSH) 0.9 %
5-10 SYRINGE (ML) INJECTION AS NEEDED
Status: CANCELLED | OUTPATIENT
Start: 2019-12-05

## 2019-09-05 RX ORDER — SODIUM CHLORIDE 0.9 % (FLUSH) 0.9 %
5-10 SYRINGE (ML) INJECTION AS NEEDED
Status: CANCELLED | OUTPATIENT
Start: 2019-10-17

## 2019-09-05 RX ORDER — SODIUM CHLORIDE 9 MG/ML
10 INJECTION INTRAMUSCULAR; INTRAVENOUS; SUBCUTANEOUS AS NEEDED
Status: CANCELLED | OUTPATIENT
Start: 2019-12-05

## 2019-09-05 RX ORDER — HEPARIN 100 UNIT/ML
500 SYRINGE INTRAVENOUS AS NEEDED
Status: ACTIVE | OUTPATIENT
Start: 2019-09-05 | End: 2019-09-05

## 2019-09-05 RX ORDER — SODIUM CHLORIDE 9 MG/ML
10 INJECTION INTRAMUSCULAR; INTRAVENOUS; SUBCUTANEOUS AS NEEDED
Status: CANCELLED | OUTPATIENT
Start: 2019-10-17

## 2019-09-05 RX ORDER — HEPARIN 100 UNIT/ML
500 SYRINGE INTRAVENOUS AS NEEDED
Status: CANCELLED | OUTPATIENT
Start: 2019-12-05

## 2019-09-05 RX ORDER — SODIUM CHLORIDE 9 MG/ML
10 INJECTION INTRAMUSCULAR; INTRAVENOUS; SUBCUTANEOUS AS NEEDED
Status: ACTIVE | OUTPATIENT
Start: 2019-09-05 | End: 2019-09-05

## 2019-09-05 RX ADMIN — Medication 10 ML: at 10:09

## 2019-09-05 RX ADMIN — Medication 500 UNITS: at 10:09

## 2019-09-05 RX ADMIN — SODIUM CHLORIDE 10 ML: 9 INJECTION INTRAMUSCULAR; INTRAVENOUS; SUBCUTANEOUS at 10:09

## 2019-09-05 NOTE — PROGRESS NOTES
\[facility] [place] [facility]\"" Progress Note    Date: 2019    Name: Vani Ramirez    MRN: [id number]         : 1975    Mr. Nicole Brunner Arrived ambulatory and in no distress for Port flush and labs. Assessment was completed, pt stated his back pain is an 8 out of 10. MD notified by RN. Right chest wall port accessed without difficulty, labs were obtained and pending. Please see [facility]. Mr. Kaiden sHu Sr's vitals were reviewed. Visit Vitals  BP (!) 172/94   Pulse 64   Temp 97.8 °F (36.6 °C)   Resp 18     Called MD regarding pt BP. Pt is going up to see MD for 10:45 appt. No new orders at this time. Pt states he has no headache and the high BP could be due to his back pain. Patient proceed to appointment with Dr. Clark Richmond. Mr. Kaiden Hsu Sr tolerated treatment well and was discharged from [place] [id number] in stable condition. Port de-accessed, flushed & heparinized per protocol. He is to return on 10/17/19 at 10:00 for his next appointment.     Arielle Hooker RN  2019

## 2019-09-05 NOTE — TELEPHONE ENCOUNTER
Spoke with Haim with palliative. Patient is currently in their office with elevated BP. /109 HR 56    Patient reports taking his BP medications as prescribed. No concerning symptoms at this time. Labs showed dehydration, and patient was given fluids in infusion center today. Patient states that his BP has been elevated at home. Will forward to Jerrell Gillespie NP for recommendation.

## 2019-09-05 NOTE — PROGRESS NOTES
Palliative Medicine Outpatient Services  [place]: [phone] ([phone])    Patient Name: Vivienne Ovalles  YOB: 1975    Date of Current Visit: 09/05/19  Location of Current Visit:    [] [facility] Office  [x] [facility] Office  [] [facility] Office  [] Home  [] Other:      Date of Initial Visit: 2/19/19   Referral from: Malinda Hansen MD  Primary Care Physician: None      SUMMARY:   Vivienne Ovalles is a 41y.o. year old with high-grade follicular lymphoma, who was referred to Palliative Medicine by Dr. Rowan Alva for symptom management and supportive care. He was diagnosed in 11/2018 after presenting with back pain. He is currently receiving systemic chemotherapy. He suffered cardiac arrest during cycle #3 due to previously undiagnosed severe stenosis of the LAD s/p PTCA and stent. He has since completed systemic chemotherapy. His most recent PET-CT (5/2019) showed no focal areas of increased hypermetabolic uptake. The patients social history includes: he is single. He lives with his father in [place]. He has 3 teenage children who live with their mother and with whom he has close contact. 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 He's applied for disability. Palliative Medicine is addressing the following current patient/family concerns: anxiety, depression, left mid- and low back pain related to malignancy, poor appetite, fatigue, advanced care planning. Initial Referral Intake note from David Beauchamp RN reviewed prior to visit   PALLIATIVE DIAGNOSES:       ICD-10-CM ICD-9-CM    1. Chronic left-sided low back pain without sciatica M54.5 724.2 gabapentin (NEURONTIN) 300 mg capsule    G89.29 338.29    2. Anxiety F41.9 300.00    3. Hypertension, unspecified type I10 401.9    4.  Follicular lymphoma grade IIIa of intra-abdominal lymph nodes (HCC) C82.33 202.03 gabapentin (NEURONTIN) 300 mg capsule          PLAN:   Patient Instructions     Dear Vivienne Ovalles ,    It was a pleasure seeing you today in [place]. [facility]. Your described symptoms were: Fatigue: 4 Drowsiness: 4   Depression: 3 Pain: 8   Anxiety: 4 Nausea: 0   Anorexia: 0 Dyspnea: 0   Best Well-Bein Constipation: No           This is the plan we talked about:      1. Pain  -Start gabapentin 300-mg caps:   -Take 1 cap at bedtime for 3 days, then:   -Take 1 cap twice daily for 3 days, then:   -Take 1 caps three times a day for 3 days, then:   -Take 2 caps three times a day. -Continue doing the back exercises and stretches prescribed for you by physical therapy  -Continue tylenol as needed  -Avoid taking ibuprofen, naprosyn or other any over-the-counter pain relievers which may interact with your blood thinner.  -You can also try using lidocaine patches over the area of pain. These are available over-the-counter     2. Anxiety  -Continue escitalopram 10-mg daily  -Continue Xanax 0.5-mg three times daily as needed    3. High blood pressure  -We spoke to a nurse in Dr. Anastasia Boyer office (cardiology)  -She is going to let Dr. Marlo Desai know and will call you later today  -You have an appointment with him on 19  -Call 911 if you at any time start to experience any of the worrisome symptoms listed below      This is what you have shared with us about Advance Care Planning:      Primary Decision Maker: Crow Cortez - Father - [phone]    Secondary Decision Maker: Sherrill Mosquera - Other Relative - [phone]  [] Named in a scanned document   [x] Legal Next of Kin  [] Guardian    ACP documents you current have include:  [] Advance Directive or Living Will  [] Durable Do Not Resuscitate  [] Physician Orders for Scope of Treatment (POST)  [] Medical Power of   [] Other      The Palliative Medicine Team is here to support you and your family.          Sincerely,      Kiah Rubio MD and the Palliative Medicine Team          Chest Pain: Care Instructions  Your Care Instructions    There are many things that can cause chest pain. Some are not serious and will get better on their own in a few days. But some kinds of chest pain need more testing and treatment. Your doctor may have recommended a follow-up visit in the next 8 to 12 hours. If you are not getting better, you may need more tests or treatment. Even though your doctor has released you, you still need to watch for any problems. The doctor carefully checked you, but sometimes problems can develop later. If you have new symptoms or if your symptoms do not get better, get medical care right away. If you have worse or different chest pain or pressure that lasts more than 5 minutes or you passed out (lost consciousness), call 911 or seek other emergency help right away. A medical visit is only one step in your treatment. Even if you feel better, you still need to do what your doctor recommends, such as going to all suggested follow-up appointments and taking medicines exactly as directed. This will help you recover and help prevent future problems. How can you care for yourself at home? · Rest until you feel better. · Take your medicine exactly as prescribed. Call your doctor if you think you are having a problem with your medicine. · Do not drive after taking a prescription pain medicine. When should you call for help? Call 911 if:    · You passed out (lost consciousness).     · You have severe difficulty breathing.     · You have symptoms of a heart attack. These may include:  ? Chest pain or pressure, or a strange feeling in your chest.  ? Sweating. ? Shortness of breath. ? Nausea or vomiting. ? Pain, pressure, or a strange feeling in your back, neck, jaw, or upper belly or in one or both shoulders or arms. ? Lightheadedness or sudden weakness. ? A fast or irregular heartbeat. After you call 911, the  may tell you to chew 1 adult-strength or 2 to 4 low-dose aspirin. Wait for an ambulance.  Do not try to drive yourself.    Call your doctor today if:    · You have any trouble breathing.     · Your chest pain gets worse.     · You are dizzy or lightheaded, or you feel like you may faint.     · You are not getting better as expected.     · You are having new or different chest pain. Where can you learn more? Go to http://manisha-anne marie.info/. Enter A120 in the search box to learn more about \"Chest Pain: Care Instructions. \"  Current as of: September 23, 2018  Content Version: 12.1  © 1407-6636 [employer]. Care instructions adapted under license by [employer] (which disclaims liability or warranty for this information). If you have questions about a medical condition or this instruction, always ask your healthcare professional. [address] any warranty or liability for your use of this information.           Counseling and Coordination:        GOALS OF CARE / TREATMENT PREFERENCES:   [====Goals of Care====]  GOALS OF CARE:  Patient / health care proxy stated goals: See Patient Instructions / Summary    TREATMENT PREFERENCES:   Code Status:  [x] Attempt Resuscitation       [] Do Not Attempt Resuscitation    Advance Care Planning:  [x] The [facility] Interdisciplinary Team has updated the ACP Navigator with Decision Maker and Patient Capacity      Primary Decision Maker: Nir Lot - Father - [phone]    Secondary Decision Maker: Sherrill Mosquera - Other Relative - [phone]  [] Named in a scanned document   [x] Legal Next of Kin  [] Guardian    Other:  (If patient appropriate for POST, consider using PALLPOST smart phrase here)    The palliative care team has discussed with patient / health care proxy about goals of care / treatment preferences for patient.  [====Goals of Care====]     PRESCRIPTIONS GIVEN:     Medications Ordered Today   Medications    gabapentin (NEURONTIN) 300 mg capsule     Sig: Take 1 Cap by mouth nightly for 3 days, THEN 1 Cap two (2) times a day for 3 days, THEN 1 Cap three (3) times daily for 3 days, THEN 2 Caps three (3) times daily for 30 days. Max Daily Amount: 1,800 mg. Dispense:  180 Cap     Refill:  3           FOLLOW UP:     Future Appointments   Date Time Provider Dahlia Epps   9/11/2019 10:00 AM Holley Jones NP [facility] SCHED   10[facility]17[facility]2019 10:00 [facility]   11/29/2019 10:00 AM SS INF7 CH3 <1H [facility]   12/5/2019 10:30 AM SS INF7 CH1 LAB [facility]   12/5/2019 10:45 AM Ar Blake NP ONCSF [facility] SCHED   1/9/2020 10:00 AM [facility]   2/20/2020 10:00 AM [facility]   4/2/2020 10:00 AM SS INF7 CH3 <1H [facility]           PHYSICIANS INVOLVED IN CARE:   Patient Care Team:  None as PCP - Jose Griffin MD (Hematology and Oncology)  Stephy Champagne MD as Physician (Palliative Medicine)  Stephy Champagne MD as Physician (Palliative Medicine)       HISTORY:   Reviewed patient-completed ESAS and advance care planning form. Reviewed patient record in prescription monitoring program.    CHIEF COMPLAINT:   Chief Complaint   Patient presents with    Back Pain       HPI/SUBJECTIVE:    The patient is: [x] Verbal / [] Nonverbal     He's doing OK. His back pain is the same. He went to physical therapy and the stretches and exercises helped but the relief didn't last long. He's been trying to keep up with the stretches and exercises at home but not doing too often. He hasn't been able to make it to the gym much recently. His father (diagnosed with prostate cancer) isn't doing too well with his treatment. He's been helping his father out. He hasn't had any panic attacks recently. He feels stressed and worried sometimes, his kids really help him get through. His appetite is good. He's moving his bowels regularly. He hasn't had any chest pressure/tightness, shortness of breath, dizziness or nausea.  He sometimes has a \"skipped beat. \" He sees his cardiologist next week. He saw Dr. Simran Walker today and everything looks good. Clinical Pain Assessment (nonverbal scale for nonverbal patients):   [++++ Clinical Pain Assessment++++]  [++++Pain Severity++++]: Pain: 8  [++++Pain Character++++]: stabbing pain in back  [++++Pain Duration++++]: months for back pain, weeks for groin pain  [++++Pain Effect++++]: little  [++++Pain Factors++++]: oxycodone helps with back pain, groin pain elicited by standing and walking  [++++Pain Frequency++++]: constant back pain with varying intensity  [++++Pain Location++++]: left lower back  [++++ Clinical Pain Assessment++++]       FUNCTIONAL ASSESSMENT:     Palliative Performance Scale (PPS):  PPS: 80       PSYCHOSOCIAL/SPIRITUAL SCREENING:     Any spiritual / Voodoo concerns:  [] Yes /  [x] No    Caregiver Burnout:  [] Yes /  [x] No /  [] No Caregiver Present      Anticipatory grief assessment:   [x] Normal  / [] Maladaptive       ESAS Anxiety: Anxiety: 4    ESAS Depression: Depression: 3       REVIEW OF SYSTEMS:     The following systems were [x] reviewed / [] unable to be reviewed  Systems: constitutional, ears/nose/mouth/throat, respiratory, gastrointestinal, genitourinary, musculoskeletal, integumentary, neurologic, psychiatric, endocrine. Positive findings noted below. Modified ESAS Completed by: provider   Fatigue: 4 Drowsiness: 4   Depression: 3 Pain: 8   Anxiety: 4 Nausea: 0   Anorexia: 0 Dyspnea: 0   Best Well-Bein Constipation: No              PHYSICAL EXAM:     Wt Readings from Last 3 Encounters:   19 161 lb (73 kg)   19 162 lb (73.5 kg)   19 156 lb 3.2 oz (70.9 kg)     Blood pressure (!) 173/109, pulse (!) 56, temperature 97.4 °F (36.3 °C), temperature source Oral, resp. rate 20, weight 161 lb (73 kg), SpO2 97 %.   Last bowel movement: See Nursing Note    Constitutional: sitting in chair, appears rested  Eyes: pupils equal, anicteric  ENMT: no nasal discharge, moist mucous membranes  Cardiovascular: regular rhythm, no peripheral edema  Respiratory: breathing not labored, symmetric  Gastrointestinal: soft non-tender, +bowel sounds  Musculoskeletal: no deformity, no tenderness to palpation  Skin: warm, dry  Neurologic: following commands, moving all extremities, normal gait  Psychiatric: full affect, no hallucinations  Other:       HISTORY:     Past Medical History:   Diagnosis Date    Anxiety     Cancer [facility])     lymphoma Nov 2018 receiving chemo    Chronic pain     lower back- lymphoma    Hyperlipidemia     Hypertension     Lymphadenopathy 11/12/2018      Past Surgical History:   Procedure Laterality Date    HX HEART CATHETERIZATION  02/2019    HX OTHER SURGICAL  02/2019    cardiac stent    IR INJECTION PSEUDOANEURYSM  2/26/2019      Family History   Problem Relation Age of Onset    Hypertension Father     Diabetes Father     Diabetes Mother       History reviewed, no pertinent family history. Social History     Tobacco Use    Smoking status: Current Every Day Smoker     Packs/day: 0.50     Years: 20.00     Pack years: 10.00    Smokeless tobacco: Never Used   Substance Use Topics    Alcohol use: No     Frequency: Never     No Known Allergies   Current Outpatient Medications   Medication Sig    CHANTIX 1 mg tablet TAKE 1 TABLET BY MOUTH TWICE A DAY    ALPRAZolam (XANAX) 1 mg tablet Take 1 Tab by mouth three (3) times daily as needed for Anxiety for up to 30 days. Max Daily Amount: 3 mg.  escitalopram oxalate (LEXAPRO) 10 mg tablet Take 1 Tab by mouth daily.  atorvastatin (LIPITOR) 40 mg tablet Take 1 Tab by mouth nightly. Indications: treatment to slow progression of coronary artery disease    clopidogrel (PLAVIX) 75 mg tab 1 Tab by Per NG tube route daily.  metoprolol succinate (TOPROL-XL) 25 mg XL tablet Take 1 Tab by mouth daily.  lisinopril (PRINIVIL, ZESTRIL) 10 mg tablet Take 1 Tab by mouth daily.  DO NOT TAKE for 5 days    aspirin delayed-release (ASPIR-81) 81 mg tablet Take 1 Tab by mouth daily.  L. acidoph & paracasei- S therm- Bifido (AUSTIN-Q/RISAQUAD) 8 billion cell cap cap Take 1 Cap by mouth daily.  magic mouthwash solution Take 15-30 mL by mouth four (4) times daily. Magic mouth wash   Maalox  Lidocaine 2% viscous   Diphenhydramine oral solution    Swish and spit for mouth pain, ok to swallow for throat pain     Pharmacy to mix equal portions of ingredients to a total volume as indicated in the dispense amount.  prochlorperazine (COMPAZINE) 10 mg tablet Take 1 Tab by mouth every six (6) hours as needed.  senna-docusate (PERICOLACE) 8.6-50 mg per tablet Take 1 Tab by mouth daily.  ondansetron hcl (ZOFRAN) 8 mg tablet Take 1 Tab by mouth every eight (8) hours as needed for Nausea. (Patient not taking: Reported on 4/29/2019)    naloxone [facility]) 4 mg/actuation nasal spray Use 1 spray intranasally, then discard. Repeat with new spray every 2 min as needed for opioid overdose symptoms, alternating nostrils. (Patient not taking: Reported on 4/29/2019)     No current facility-administered medications for this visit.       Facility-Administered Medications Ordered in Other Visits   Medication Dose Route Frequency    sodium chloride (NS) flush 5-10 mL  5-10 mL IntraVENous PRN    sodium chloride 0.9% injection 10 mL  10 mL IntraVENous PRN    heparin (porcine) pf 500 Units  500 Units IntraVENous PRN          LAB DATA REVIEWED:     Lab Results   Component Value Date/Time    WBC 7.2 09/05/2019 10:12 AM    HGB 14.1 09/05/2019 10:12 AM    PLATELET 728 26/82/1119 10:12 AM     Lab Results   Component Value Date/Time    Sodium 144 09/05/2019 10:12 AM    Potassium 3.1 (L) 09/05/2019 10:12 AM    Chloride 111 (H) 09/05/2019 10:12 AM    CO2 27 09/05/2019 10:12 AM    BUN 6 09/05/2019 10:12 AM    Creatinine 0.99 09/05/2019 10:12 AM    Calcium 8.7 09/05/2019 10:12 AM    Magnesium 1.7 01/12/2019 04:05 AM    Phosphorus 2.0 (L) 01/12/2019 04:05 AM      Lab Results   Component Value Date/Time    AST (SGOT) 21 09/05/2019 10:12 AM    Alk.  phosphatase 123 (H) 09/05/2019 10:12 AM    Protein, total 6.4 09/05/2019 10:12 AM    Albumin 3.4 (L) 09/05/2019 10:12 AM    Globulin 3.0 09/05/2019 10:12 AM     Lab Results   Component Value Date/Time    INR 1.1 02/22/2019 08:18 PM    Prothrombin time 10.8 02/22/2019 08:18 PM    aPTT 27.8 02/22/2019 08:18 PM      No results found for: IRON, FE, TIBC, IBCT, PSAT, FERR        CONTROLLED SUBSTANCES SAFETY ASSESSMENT (IF ON CONTROLLED SUBSTANCES):     Reviewed opioid safety handout:  [x] Yes   [] No  24 hour opioid dose >150mg morphine equivalent/day:  [] Yes   [x] No  Benzodiazepines:  [x] Yes   [] No  Sleep apnea:  [] Yes   [x] No  Urine Toxicology Testing within last 6 months:  [] Yes   [] No  History of or new aberrant medication taking behaviors:  [] Yes   [] No  Has Narcan been prescribed [] Yes   [x] No          Total time:   Counseling / coordination time:   > 50% counseling / coordination?:

## 2019-09-05 NOTE — PROGRESS NOTES
Palliative Medicine Office Visit  Palliative Medicine Nurse Check In  [phone]    Patient Name: Linda Myers  YOB: 1975      Date of Office Visit: 9/5/19  Patient states: \" Office visit follow up\"     From Check In Sheet (scanned in Media):  Has a medical provider talked with you about cardiopulmonary resuscitation (CPR)? [] Yes   [x] No   [] Unable to obtain    Nurse reminder to complete or update ACP FlowSheet:    Is ACP on the Problem List?    [] Yes    [x] No  IF ACP Document is ON FILE; Nurse to place ACP on Problem List     Is there an ACP Note in Chart Review/Note? [] Yes    [x] No   If NO: [address] Planning 9/5/2019   Patient's Healthcare Decision Maker is: Legal Next of Kin   Confirm Advance Directive None   Patient Would Like to Complete Advance Directive No   Does the patient have other document types -       Is there anything that we should know about you as a person in order to provide you the best care possible? No    Have you been to the ER, urgent care clinic since your last visit? [] Yes   [x] No   [] Unable to obtain    Have you been hospitalized since your last visit? [] Yes   [x] No   [] Unable to obtain    Have you seen or consulted any other health care providers outside of the [address] [place] [address] since your last visit? [x] Yes   [] No   [] Unable to obtain    Functional status (describe): Independent      Last BM: 9/5/19     accessed (date): 9/4/19    Bottle review (for opioid pain medication): None  Medication 1:       Patient states he goes to Dr. Rogelio Nuñez (cardiologist) for his B/P. Last visit at end of July.

## 2019-09-05 NOTE — PROGRESS NOTES
Room 12    Identified pt with two pt identifiers(name and ). Reviewed record in preparation for visit and have obtained necessary documentation. All patient medications has been reviewed. Chief Complaint   Patient presents with    Lymphoma     Follow up    Sari Cook 22     left side 9/10      Will recheck BP at the end of visit  Health Maintenance Due   Topic    Pneumococcal 0-64 years (1 of 3 - PCV13)    DTaP/Tdap/Td series (1 - Tdap)    Influenza Age 5 to Adult        Vitals:    19 1045   BP: (!) 154/97   Pulse: 70   Resp: 16   Temp: 97.9 °F (36.6 °C)   TempSrc: Oral   SpO2: 98%   Weight: 162 lb (73.5 kg)   Height: 5' 7\" (1.702 m)   PainSc:   9   PainLoc: Back       Coordination of Care Questionnaire:   1) Have you been to an emergency room, urgent care, or hospitalized since your last visit?   no       2. Have seen or consulted any other health care provider since your last visit? NO    3) Do you have an Advanced Directive/ Living Will in place? NO  If yes, do we have a copy on file NO  If no, would you like information YES    Patient is accompanied by self I have received verbal consent from [address] [place] [address] to discuss any/all medical information while they are present in the room.

## 2019-09-05 NOTE — PATIENT INSTRUCTIONS
Dear Landa Kocher ,    It was a pleasure seeing you today in [facility]. Your described symptoms were: Fatigue: 4 Drowsiness: 4   Depression: 3 Pain: 8   Anxiety: 4 Nausea: 0   Anorexia: 0 Dyspnea: 0   Best Well-Bein Constipation: No           This is the plan we talked about:      1. Pain  -Start gabapentin 300-mg caps:   -Take 1 cap at bedtime for 3 days, then:   -Take 1 cap twice daily for 3 days, then:   -Take 1 caps three times a day for 3 days, then:   -Take 2 caps three times a day. -Continue doing the back exercises and stretches prescribed for you by physical therapy  -Continue tylenol as needed  -Avoid taking ibuprofen, naprosyn or other any over-the-counter pain relievers which may interact with your blood thinner.  -You can also try using lidocaine patches over the area of pain. These are available over-the-counter     2. Anxiety  -Continue escitalopram 10-mg daily  -Continue Xanax 0.5-mg three times daily as needed    3. High blood pressure  -We spoke to a nurse in Dr. Melo Mood office (cardiology)  -She is going to let Dr. Carrera Enter know and will call you later today  -You have an appointment with him on 19  -Call 911 if you at any time start to experience any of the worrisome symptoms listed below      This is what you have shared with us about Advance Care Planning:      Primary Decision Maker: Nicolette Mohamud - Father - [phone]    Secondary Decision Maker: Sherrill Mosquera - Other Relative - [phone]  [] Named in a scanned document   [x] Legal Next of Kin  [] Guardian    ACP documents you current have include:  [] Advance Directive or Living Will  [] Durable Do Not Resuscitate  [] Physician Orders for Scope of Treatment (POST)  [] Medical Power of   [] Other      The Palliative Medicine Team is here to support you and your family.          Sincerely,      Cassidy Vernon MD and the Palliative Medicine Team          Chest Pain: Care Instructions  Your Care Instructions    There are many things that can cause chest pain. Some are not serious and will get better on their own in a few days. But some kinds of chest pain need more testing and treatment. Your doctor may have recommended a follow-up visit in the next 8 to 12 hours. If you are not getting better, you may need more tests or treatment. Even though your doctor has released you, you still need to watch for any problems. The doctor carefully checked you, but sometimes problems can develop later. If you have new symptoms or if your symptoms do not get better, get medical care right away. If you have worse or different chest pain or pressure that lasts more than 5 minutes or you passed out (lost consciousness), call 911 or seek other emergency help right away. A medical visit is only one step in your treatment. Even if you feel better, you still need to do what your doctor recommends, such as going to all suggested follow-up appointments and taking medicines exactly as directed. This will help you recover and help prevent future problems. How can you care for yourself at home? · Rest until you feel better. · Take your medicine exactly as prescribed. Call your doctor if you think you are having a problem with your medicine. · Do not drive after taking a prescription pain medicine. When should you call for help? Call 911 if:    · You passed out (lost consciousness).     · You have severe difficulty breathing.     · You have symptoms of a heart attack. These may include:  ? Chest pain or pressure, or a strange feeling in your chest.  ? Sweating. ? Shortness of breath. ? Nausea or vomiting. ? Pain, pressure, or a strange feeling in your back, neck, jaw, or upper belly or in one or both shoulders or arms. ? Lightheadedness or sudden weakness. ? A fast or irregular heartbeat. After you call 911, the  may tell you to chew 1 adult-strength or 2 to 4 low-dose aspirin.  Wait for an ambulance. Do not try to drive yourself.    Call your doctor today if:    · You have any trouble breathing.     · Your chest pain gets worse.     · You are dizzy or lightheaded, or you feel like you may faint.     · You are not getting better as expected.     · You are having new or different chest pain. Where can you learn more? Go to http://manisha-anne marie.info/. Enter A120 in the search box to learn more about \"Chest Pain: Care Instructions. \"  Current as of: September 23, 2018  Content Version: 12.1  © 4869-0967 [employer]wise, [employer]. Care instructions adapted under license by [employer] (which disclaims liability or warranty for this information). If you have questions about a medical condition or this instruction, always ask your healthcare professional. [address] any warranty or liability for your use of this information.

## 2019-09-05 NOTE — PROGRESS NOTES
Your potassium was a bit low, which can make you tired or have muscle cramps. Please make sure to eat a daily banana, potato, sweat potato or tomato to help with this. You could also start a daily multivitamin, which will helps as well.

## 2019-09-05 NOTE — PROGRESS NOTES
Called patient. Informed him, per Dr. Wesley Chau, that his potassium was a bit low which could make patient feel tired or have muscle cramps. Advised that he eat a daily banana, potato, sweet potato, or tomato to help with potassium level. Also informed him that he could start a daily multivitamin. Patient verbalized understanding. No further questions or concerns at this time.

## 2019-09-05 NOTE — TELEPHONE ENCOUNTER
If consistently elevated  >130/80, OK to increase lisinopril to 20mg daily and see me end of next week. Potassium was also noted low on labs, can you ask pt to discuss with Dr. Gene [place]. Thanks. Component      Latest Ref Rng & Units 9/5/2019          10:12 AM   Sodium      136 - 145 mmol/L 144   Potassium      3.5 - 5.1 mmol/L 3.1 (L)   Chloride      97 - 108 mmol/L 111 (H)   CO2      21 - 32 mmol/L 27   Anion gap      5 - 15 mmol/L 6   Glucose      65 - 100 mg/dL 133 (H)   BUN      6 - 20 MG/DL 6   Creatinine      0.70 - 1.30 MG/DL 0.99   BUN/Creatinine ratio      12 - 20   6 (L)   GFR est AA      >60 ml/min/1.73m2 >60   GFR est non-AA      >60 ml/min/1.73m2 >60   Calcium      8.5 - 10.1 MG/DL 8.7   Bilirubin, total      0.2 - 1.0 MG/DL 0.3   ALT (SGPT)      12 - 78 U/L 27   AST      15 - 37 U/L 21   Alk.  phosphatase      45 - 117 U/L 123 (H)   Protein, total      6.4 - 8.2 g/dL 6.4   Albumin      3.5 - 5.0 g/dL 3.4 (L)   Globulin      2.0 - 4.0 g/dL 3.0   A-G Ratio      1.1 - 2.2   1.1

## 2019-09-06 ENCOUNTER — TELEPHONE (OUTPATIENT)
Dept: CARDIOLOGY CLINIC | Age: 44
End: 2019-09-06

## 2019-09-06 RX ORDER — LISINOPRIL 20 MG/1
20 TABLET ORAL DAILY
Qty: 1 TAB | Refills: 0
Start: 2019-09-06 | End: 2020-03-10 | Stop reason: DRUGHIGH

## 2019-09-11 ENCOUNTER — OFFICE VISIT (OUTPATIENT)
Dept: CARDIOLOGY CLINIC | Age: 44
End: 2019-09-11

## 2019-09-11 VITALS
RESPIRATION RATE: 18 BRPM | DIASTOLIC BLOOD PRESSURE: 94 MMHG | WEIGHT: 160.2 LBS | HEART RATE: 68 BPM | HEIGHT: 67 IN | SYSTOLIC BLOOD PRESSURE: 128 MMHG | BODY MASS INDEX: 25.15 KG/M2

## 2019-09-11 DIAGNOSIS — I25.10 CORONARY ARTERY DISEASE INVOLVING NATIVE CORONARY ARTERY OF NATIVE HEART WITHOUT ANGINA PECTORIS: ICD-10-CM

## 2019-09-11 DIAGNOSIS — Z72.0 TOBACCO USE: ICD-10-CM

## 2019-09-11 DIAGNOSIS — I10 ESSENTIAL HYPERTENSION: Primary | ICD-10-CM

## 2019-09-11 DIAGNOSIS — E78.5 DYSLIPIDEMIA: ICD-10-CM

## 2019-09-11 RX ORDER — METOPROLOL SUCCINATE 50 MG/1
50 TABLET, EXTENDED RELEASE ORAL DAILY
Qty: 30 TAB | Refills: 0
Start: 2019-09-11 | End: 2019-10-07 | Stop reason: SDUPTHER

## 2019-09-11 NOTE — PROGRESS NOTES
Dank Gan, [facility]  [address] [facility], [address] [place] [address]    Office [phone]  Fax [phone]        NAME: Todd Leon Sr   :  1975  MRM:  [id number]    Date:  2019         Assessment:     Problem List  Date Reviewed: 2019          Codes Class Noted    Chronic left-sided low back pain without sciatica ICD-10-CM: M54.5, G89.29  ICD-9-CM: 724.2, 338.29  2019        Pseudoaneurysm ([facility] ICD-10-CM: I72.9  ICD-9-CM: 442.9  2019        ACS (acute coronary syndrome) ([facility] ICD-10-CM: I24.9  ICD-9-CM: 411.1  2019        Anxiety ICD-10-CM: F41.9  ICD-9-CM: 300.00  1156        Follicular lymphoma ([facility] ICD-10-CM: C82.90  ICD-9-CM: 202.00  2018        Hyperlipidemia ICD-10-CM: E78.5  ICD-9-CM: 272.4  Unknown        HTN (hypertension) ICD-10-CM: I10  ICD-9-CM: 401.9  2016        Tobacco abuse ICD-10-CM: Z72.0  ICD-9-CM: 305.1  2016                 Plan:        CAD: He has chronic total occlusion of the right coronary artery and revascularized LAD. Continue aspirin and Plavix, BB, and statin therapy. No anginal c/o today. Hypertension: Blood pressure recently elevated. Discussed decreased use of NSAIDs which may have been contributing. Increase metoprolol to 50mg daily. Dyslipidemia: Continue Lipitor. Check fasting lipids     Hypokalemia - increase dietary potassium; if low on recheck may benefit from supplement. Has repeat labs in near future with Oncology. Tobacco Use - now smoking 8 to 10 cigarettes per day (prev 1ppd); working on cessation    F/u in 2-4 weeks. Subjective:     Harish Villalta is a 45 y.o. male with past medical history significant for hypertension dyslipidemia was diagnosed with lymphoma in 2018.   He was undergoing chemotherapy and was at the [facility] center on 2019 when he had sudden cardiac death and had AED CPR performed and was transferred to [facility] where a cardiac catheterization performed by Dr. Nishant Em demonstrated a 95% proximal LAD lesion which he had stented. The distal RCA showed chronic total occlusion. The obtuse marginal did not have any lesions. Echocardiogram recently on March 8, 2019 demonstrated normal LVEF without significant regional wall motion abnormality. Pt is here today for f/u of elevated blood pressures. States pressures consistently elevated when seen by oncology. Had been taking a lot of Ibuprofen but states he stopped this last week. Patient denies any chest pain, dyspnea, palpitations, syncope, orthopnea, edema, or paroxysmal nocturnal dyspnea. Exam:     Physical Exam:  Visit Vitals  BP (!) 128/94 (BP 1 Location: Left arm)   Pulse 68   Resp 18   Ht 5' 7\" (1.702 m)   Wt 160 lb 3.2 oz (72.7 kg)   BMI 25.09 kg/m²     Repeat /97    BP Readings from Last 3 Encounters:   09/11/19 (!) 128/94   09/05/19 (!) 172/94   09/05/19 (!) 173/109     Wt Readings from Last 3 Encounters:   09/11/19 160 lb 3.2 oz (72.7 kg)   09/05/19 161 lb (73 kg)   09/05/19 162 lb (73.5 kg)       General - well developed well nourished  Neck - no JVD  Cardiac - normal S1, S2, RRR, no murmurs, rubs or gallops. No clicks  Vascular - radials and pedal pulses equal bilateral  Lungs - clear to auscultation bilaterals, no rales, wheezing or rhonchi  Abd - soft nontender, non-distended, +BS  Extremities - no edema, warm, well perfused  Neuro - nonfocal  Psych - normal mood and affect      Medications:     Current Outpatient Medications   Medication Sig    lisinopril (PRINIVIL, ZESTRIL) 20 mg tablet Take 1 Tab by mouth daily.  gabapentin (NEURONTIN) 300 mg capsule Take 1 Cap by mouth nightly for 3 days, THEN 1 Cap two (2) times a day for 3 days, THEN 1 Cap three (3) times daily for 3 days, THEN 2 Caps three (3) times daily for 30 days.  Max Daily Amount: 1,800 mg.    CHANTIX 1 mg tablet TAKE 1 TABLET BY MOUTH TWICE A DAY    escitalopram oxalate (LEXAPRO) 10 mg tablet Take 1 Tab by mouth daily.  atorvastatin (LIPITOR) 40 mg tablet Take 1 Tab by mouth nightly. Indications: treatment to slow progression of coronary artery disease    clopidogrel (PLAVIX) 75 mg tab 1 Tab by Per NG tube route daily.  aspirin delayed-release (ASPIR-81) 81 mg tablet Take 1 Tab by mouth daily.  metoprolol succinate (TOPROL-XL) 50 mg XL tablet Take 1 Tab by mouth daily.  ALPRAZolam (XANAX) 1 mg tablet Take 1 Tab by mouth three (3) times daily as needed for Anxiety for up to 30 days. Max Daily Amount: 3 mg. No current facility-administered medications for this visit. Diagnostic Data Review:       5/31/19: NUC STRESS- · Nml. No ischemia. 84% MPHR. LVEF 60%. No EKG changes of ischemia at peak exercise. 3/8/19: ECHO- EF 56 - 60%; NWMA  2/14/19: CATH- Severe string like stenosis (95%) of the proximal LAD (culprit) and  of the distal RCA with bridging collaterals. Low LV end diastolic filling pressure  Successful PTCA then stenting of the proximal LAD with a 2.25 x 26 mm TOM (Philip) with 0% residual stenosis with brisk runoff and flow. 2/14/19: ECHO- EF 41 - 45%. Abnormal LV wall motion. Normal LV strain. Mild (grade 1) left ventricular diastolic dysfunction. 12/17/18: ECHO- LV Global longitudinal strain average -17.3%. EF nml at 65%. [address]    9/27/16: ECHO- EF 60-65%; NWMA;  No intracardiac shunt      Lab Review:     Lab Results   Component Value Date/Time    Sodium 144 09/05/2019 10:12 AM    Potassium 3.1 (L) 09/05/2019 10:12 AM    Chloride 111 (H) 09/05/2019 10:12 AM    CO2 27 09/05/2019 10:12 AM    Anion gap 6 09/05/2019 10:12 AM    Glucose 133 (H) 09/05/2019 10:12 AM    BUN 6 09/05/2019 10:12 AM    Creatinine 0.99 09/05/2019 10:12 AM    BUN/Creatinine ratio 6 (L) 09/05/2019 10:12 AM    GFR est AA >60 09/05/2019 10:12 AM    GFR est non-AA >60 09/05/2019 10:12 AM    Calcium 8.7 09/05/2019 10:12 AM     Lab Results   Component Value Date/Time    WBC 7.2 09/05/2019 10:12 AM    Hemoglobin (POC) 15.0 06/05/2009 02:13 PM    HGB 14.1 09/05/2019 10:12 AM    Hematocrit (POC) 39 02/14/2019 01:24 PM    HCT 40.9 09/05/2019 10:12 AM    PLATELET 355 35/57/7585 10:12 AM    MCV 95.1 09/05/2019 10:12 AM     Lab Results   Component Value Date/Time    Cholesterol, total 170 09/28/2016 04:40 AM    HDL Cholesterol 23 09/28/2016 04:40 AM    LDL, calculated 96.4 09/28/2016 04:40 AM    VLDL, calculated 50.6 09/28/2016 04:40 AM    Triglyceride 253 (H) 09/28/2016 04:40 AM    CHOL/HDL Ratio 7.4 (H) 09/28/2016 04:40 AM     Lab Results   Component Value Date/Time    Hemoglobin A1c 5.6 09/28/2016 04:40 AM     Lab Results   Component Value Date/Time    TSH 1.53 09/28/2016 04:40 AM     _________________________________________________    Jhon Chris, ANP

## 2019-09-11 NOTE — PATIENT INSTRUCTIONS
Increase metoprolol to 50mg per day. See me again in 2 weeks. Next time you get labs with oncology, include fasting lipids (cholesterol)    Increase potassium in your diet.

## 2019-09-11 NOTE — PROGRESS NOTES
Visit Vitals  BP (!) 128/94 (BP 1 Location: Left arm)   Pulse 68   Resp 18   Ht 5' 7\" (1.702 m)   Wt 160 lb 3.2 oz (72.7 kg)   BMI 25.09 kg/m²       Patient is here for follow up due to his blood pressure being elevated at his last visit with the Oncologist.

## 2019-09-16 ENCOUNTER — TELEPHONE (OUTPATIENT)
Dept: PALLATIVE CARE | Age: 44
End: 2019-09-16

## 2019-09-16 ENCOUNTER — NURSE NAVIGATOR (OUTPATIENT)
Dept: PALLATIVE CARE | Age: 44
End: 2019-09-16

## 2019-09-16 DIAGNOSIS — F41.9 ANXIETY: ICD-10-CM

## 2019-09-16 RX ORDER — ALPRAZOLAM 1 MG/1
1 TABLET ORAL
Qty: 90 TAB | Refills: 0 | OUTPATIENT
Start: 2019-09-16 | End: 2019-10-17 | Stop reason: SDUPTHER

## 2019-09-16 NOTE — TELEPHONE ENCOUNTER
Patient is calling to see if MD will call in refill on Xanax script. Advised nurse would call to discuss. Confirmed pharmacy.

## 2019-09-16 NOTE — PROGRESS NOTES
Palliative Medicine  Nursing Note  [phone]  Fax [phone]      Telephone Call  Patient Name: Sal Mcdonald  YOB: [birth year]        Primary Decision Maker: Pastor [place] - Father - [phone]    Secondary Decision Maker: Sherrill Mosquera - Other Relative - [phone]   Advance Care Planning 9/5/2019   Patient's Healthcare Decision Maker is: Legal Next of Kin   Confirm Advance Directive None   Patient Would Like to Complete Advance Directive No   Does the patient have other document types -     Dr. Jose Clay approved prescription for Xanax 1mg 1 tab by mouth 3 times daily as needed for anxiety #90 with no refills. This was call into patient's pharmacy. Call placed to Mr. Gerson Muñoz and informed of the above. He was appreciative.     William Chaudhry, RN  Palliative Medicine

## 2019-10-07 NOTE — TELEPHONE ENCOUNTER
Patient is completely out of medication due to doubling his dosage and stated that [facility] will not fill his metoprolol. Please advise.

## 2019-10-08 RX ORDER — METOPROLOL SUCCINATE 50 MG/1
50 TABLET, EXTENDED RELEASE ORAL DAILY
Qty: 90 TAB | Refills: 0 | Status: SHIPPED | OUTPATIENT
Start: 2019-10-08 | End: 2020-01-08

## 2019-10-08 NOTE — TELEPHONE ENCOUNTER
Refill of Metoprolol 50 mg XL completed per VO of Dr. Yaneth Meza.     Last OV: 9/2019  Next OV: 10/2019

## 2019-10-14 ENCOUNTER — DOCUMENTATION ONLY (OUTPATIENT)
Dept: PALLATIVE CARE | Age: 44
End: 2019-10-14

## 2019-10-17 ENCOUNTER — TELEPHONE (OUTPATIENT)
Dept: PALLATIVE CARE | Age: 44
End: 2019-10-17

## 2019-10-17 DIAGNOSIS — F41.9 ANXIETY: ICD-10-CM

## 2019-10-17 RX ORDER — ALPRAZOLAM 1 MG/1
1 TABLET ORAL
Qty: 90 TAB | Refills: 0 | OUTPATIENT
Start: 2019-10-17 | End: 2019-11-13 | Stop reason: SDUPTHER

## 2019-10-17 NOTE — TELEPHONE ENCOUNTER
Returned call to Mr Antonia Welch. \" My apologies the Rx was called into the [facility] earlier\". He shared his dad passed away recently and he's never had to plan a . His mom passed away when he was 15. I advised him to please let PM know if it's anything we can do to help him. He said he has the support of his Aunts. He was appreciative of the suggestion.

## 2019-10-17 NOTE — TELEPHONE ENCOUNTER
Patient calling asking for refills on his Xanax. Confirmed pharmacy in system is the one he still uses. Advised nurse would call to discuss.

## 2019-10-17 NOTE — TELEPHONE ENCOUNTER
Malia roca [facility] is calling regarding patient's request for Xanax. Malia Kilgore states patient asked him to call office to get this script filled. Advised that nurse is working on this.

## 2019-10-17 NOTE — TELEPHONE ENCOUNTER
Patient calling stating is REALLY needs his medication. States his anxiety is thru the roof. States father passed away and he is having a lot of trouble. Advised would let nurse know and she would call to update.

## 2019-10-17 NOTE — TELEPHONE ENCOUNTER
Returned call to  Juliet Rascon he is requesting refill on the following: ALPRAZolam (XANAX) 1 mg tablet. The medication was last filled on 9/16/19 for 90/30 taking 3 tabs a day with 0 tabs left. I informed  Juliet Rascon Dr Best Keller is out of the office. I will send a message to the covering MD and call him back later. He verbalized understanding.     Refill has been approved for Alprazolam 1 mg tab 90/30 called into the [facility].

## 2019-10-18 ENCOUNTER — OFFICE VISIT (OUTPATIENT)
Dept: CARDIOLOGY CLINIC | Age: 44
End: 2019-10-18

## 2019-10-18 VITALS
BODY MASS INDEX: 24.45 KG/M2 | HEIGHT: 67 IN | WEIGHT: 155.8 LBS | HEART RATE: 79 BPM | DIASTOLIC BLOOD PRESSURE: 92 MMHG | RESPIRATION RATE: 18 BRPM | SYSTOLIC BLOOD PRESSURE: 138 MMHG | OXYGEN SATURATION: 97 %

## 2019-10-18 DIAGNOSIS — I25.10 CORONARY ARTERY DISEASE INVOLVING NATIVE CORONARY ARTERY OF NATIVE HEART WITHOUT ANGINA PECTORIS: ICD-10-CM

## 2019-10-18 DIAGNOSIS — I10 HYPERTENSION, UNSPECIFIED TYPE: Primary | ICD-10-CM

## 2019-10-18 DIAGNOSIS — I10 ESSENTIAL HYPERTENSION: ICD-10-CM

## 2019-10-18 DIAGNOSIS — E78.5 DYSLIPIDEMIA: ICD-10-CM

## 2019-10-18 RX ORDER — METOPROLOL SUCCINATE 25 MG/1
TABLET, EXTENDED RELEASE ORAL
Refills: 3 | COMMUNITY
Start: 2019-09-14 | End: 2019-10-18

## 2019-10-18 NOTE — PROGRESS NOTES
Office Follow-up    NAME: Carl Espinoza Sr   :  1975  MRM:  [id number]    Date:  10/18/2019            Assessment:     Problem List  Date Reviewed: 10/18/2019          Codes Class Noted    Chronic left-sided low back pain without sciatica ICD-10-CM: M54.5, G89.29  ICD-9-CM: 724.2, 338.29  2019        Pseudoaneurysm ([facility] ICD-10-CM: I72.9  ICD-9-CM: 442.9  2019        ACS (acute coronary syndrome) ([facility] ICD-10-CM: I24.9  ICD-9-CM: 411.1  2019        Anxiety ICD-10-CM: F41.9  ICD-9-CM: 300.00          Follicular lymphoma ([facility] ICD-10-CM: C82.90  ICD-9-CM: 202.00  2018        Hyperlipidemia ICD-10-CM: E78.5  ICD-9-CM: 272.4  Unknown        HTN (hypertension) ICD-10-CM: I10  ICD-9-CM: 401.9  2016        Tobacco abuse ICD-10-CM: Z72.0  ICD-9-CM: 305.1  2016                 Plan:     1. Chest pain: Recently on May 2019 he underwent a stress test which was negative for ischemia with a preserved LVEF. He has a  of the RCA with PCI of the LAD. He likely has natural collaterals supplying the RCA territory. Currently denies any angina. Continue medical therapy with aspirin, Plavix, Lipitor, metoprolol. Again discussed about tobacco cessation. 2. History of follicular lymphoma: He is being followed by Dr. Mary Kate Penn. He is in remission. He has had Doxorubicin in past but LVEF declined to  45 after MI. His LVEF was normal recently in May 2019. 3. CAD: As above  4. Hypertension: Blood pressure is controlled. Continue metoprolol. 5. Dyslipidemia: Continue Lipitor. 6. See Dr. Jaspreet Collier in 6 months. Subjective:     Norah Fernandez, a 45y.o. year-old who presents for followup. He has known history of proximal LAD PCI status post cardiac arrest.  He also has a CT of the RCA. He has diagnosis of lymphoma diagnosed in 2018. He underwent chemotherapy and is under surveillance.   Last PET in 2019 did not demonstrate any focus of abnormal metabolism. He has known history of proximal LAD PCI status post cardiac arrest.  I had seen him one month ago with symptoms of chest pain. He is undergoing chemotherapy for lymphoma. On his previous visit his chest pain appeared to be noncardiac in origin and was self-limiting. We decided to wait and watch. He is returning today for follow-up. In the past 1 month he has not had any new episode of chest pain except for this morning when he woke up with left-sided chest heaviness which lasted for 20 minutes. He moved around and his chest heaviness improved after some time. There was no associated symptoms of shortness of breath or lightheadedness. -----------------------------------------------------------    HPI: Ani Dempsey. is a 41 y.o. male with past medical history significant for hypertension dyslipidemia was diagnosed with lymphoma in November 2018. He was undergoing chemotherapy and was at the [facility] on February 14, 2019 when he had sudden cardiac death and had AED CPR performed and was transferred to [facility] where a cardiac catheterization performed by Dr. Kal Becker demonstrated a 95% proximal LAD lesion which he had stented. The distal RCA showed chronic total occlusion. The obtuse marginal did not have any lesions. He has been doing well since revascularization. He is back on his chemo however yesterday when he was awaiting his chemotherapy he had an episode of chest pain which was sharp in nature moderate intensity left anterior nonradiating and lasted for 1 minute. He has not had any recurrence of the chest pain. He has been taking his medications as prescribed. Echocardiogram recently on March 8, 2019 demonstrated normal LVEF without significant regional wall motion abnormality.     Exam:     Physical Exam:  Visit Vitals  BP (!) 138/92 (BP 1 Location: Left arm, BP Patient Position: Sitting)   Pulse 79   Resp 18   Ht 5' 7\" (1.702 m)   Wt 155 lb 12.8 oz (70.7 kg) SpO2 97%   BMI 24.40 kg/m²     General appearance - alert, well appearing, and in no distress  Mental status - affect appropriate to mood  Eyes - sclera anicteric, moist mucous membranes  Neck - supple, no significant adenopathy  Chest - clear to auscultation, no wheezes, rales or rhonchi  Heart - normal rate, regular rhythm, normal S1, S2, no murmurs, rubs, clicks or gallops  Abdomen - soft, nontender, nondistended, no masses or organomegaly  Extremities - peripheral pulses normal, no pedal edema  Skin - normal coloration  no rashes    Medications:     Current Outpatient Medications   Medication Sig    ALPRAZolam (XANAX) 1 mg tablet Take 1 Tab by mouth three (3) times daily as needed for Anxiety for up to 30 days. Max Daily Amount: 3 mg. Indications: anxious    metoprolol succinate (TOPROL-XL) 50 mg XL tablet Take 1 Tab by mouth daily.  lisinopril (PRINIVIL, ZESTRIL) 20 mg tablet Take 1 Tab by mouth daily.  escitalopram oxalate (LEXAPRO) 10 mg tablet Take 1 Tab by mouth daily.  atorvastatin (LIPITOR) 40 mg tablet Take 1 Tab by mouth nightly. Indications: treatment to slow progression of coronary artery disease    clopidogrel (PLAVIX) 75 mg tab 1 Tab by Per NG tube route daily.  aspirin delayed-release (ASPIR-81) 81 mg tablet Take 1 Tab by mouth daily.  CHANTIX 1 mg tablet TAKE 1 TABLET BY MOUTH TWICE A DAY (Patient not taking: Reported on 10/18/2019)     No current facility-administered medications for this visit. Diagnostic Data Review:       5/31/19: NUC STRESS- · Nml. No ischemia. 84% MPHR. LVEF 60%. No EKG changes of ischemia at peak exercise. 3/8/19: ECHO- EF 56 - 60%; NWMA  2/14/19: CATH- Severe string like stenosis (95%) of the proximal LAD (culprit) and  of the distal RCA with bridging collaterals.  Low LV end diastolic filling pressure  Successful PTCA then stenting of the proximal LAD with a 2.25 x 26 mm TOM (Philip) with 0% residual stenosis with brisk runoff and flow.  2/14/19: ECHO- EF 41 - 45%. Abnormal LV wall motion. Normal LV strain. Mild (grade 1) left ventricular diastolic dysfunction. 12/17/18: ECHO- LV Global longitudinal strain average -17.3%. EF nml at 65%. [address]    9/27/16: ECHO- EF 60-65%; NWMA; No intracardiac shunt      Lab Review:     Lab Results   Component Value Date/Time    Cholesterol, total 170 09/28/2016 04:40 AM    HDL Cholesterol 23 09/28/2016 04:40 AM    LDL, calculated 96.4 09/28/2016 04:40 AM    Triglyceride 253 (H) 09/28/2016 04:40 AM    CHOL/HDL Ratio 7.4 (H) 09/28/2016 04:40 AM     Lab Results   Component Value Date/Time    Creatinine (POC) 0.9 02/14/2019 01:24 PM    Creatinine 0.99 09/05/2019 10:12 AM     Lab Results   Component Value Date/Time    BUN 6 09/05/2019 10:12 AM    BUN (POC) 14 02/14/2019 01:24 PM     Lab Results   Component Value Date/Time    Potassium 3.1 (L) 09/05/2019 10:12 AM     Lab Results   Component Value Date/Time    Hemoglobin A1c 5.6 09/28/2016 04:40 AM     Lab Results   Component Value Date/Time    Hemoglobin (POC) 15.0 06/05/2009 02:13 PM    HGB 14.1 09/05/2019 10:12 AM     Lab Results   Component Value Date/Time    PLATELET 569 35/68/2327 10:12 AM     No results for input(s): CPK, CKMB, TROIQ in the last 72 hours. No lab exists for component: CKQMB, CPKMB             ___________________________________________________    Debbrah Lipoma.  David Costello MD, [facility]

## 2019-10-18 NOTE — PROGRESS NOTES
Chief Complaint   Patient presents with    Follow-up     Patient presents today for a follow up visit for CAD & HTN    Coronary Artery Disease    Hypertension       Visit Vitals  BP (!) 138/92 (BP 1 Location: Left arm, BP Patient Position: Sitting)   Pulse 79   Resp 18   Ht 5' 7\" (1.702 m)   Wt 155 lb 12.8 oz (70.7 kg)   SpO2 97%   BMI 24.40 kg/m²       Chest pain denied  SOB denied  Dizziness denied  Swelling/Edema - denied  Recent hospital visit denied  Refills denied

## 2019-10-22 ENCOUNTER — HOSPITAL ENCOUNTER (OUTPATIENT)
Dept: INFUSION THERAPY | Age: 44
Discharge: HOME | End: 2019-10-22
Payer: COMMERCIAL

## 2019-10-22 VITALS
RESPIRATION RATE: 18 BRPM | HEIGHT: 67 IN | DIASTOLIC BLOOD PRESSURE: 93 MMHG | WEIGHT: 153.4 LBS | BODY MASS INDEX: 24.08 KG/M2 | TEMPERATURE: 97.5 F | HEART RATE: 71 BPM | SYSTOLIC BLOOD PRESSURE: 139 MMHG

## 2019-10-22 DIAGNOSIS — C82.90 FOLLICULAR LYMPHOMA, UNSPECIFIED FOLLICULAR LYMPHOMA TYPE, UNSPECIFIED BODY REGION (HCC): Primary | ICD-10-CM

## 2019-10-22 LAB
ALBUMIN SERPL-MCNC: 3.4 G/DL (ref 3.5–5)
ALBUMIN/GLOB SERPL: 1.2 {RATIO} (ref 1.1–2.2)
ALP SERPL-CCNC: 179 U/L (ref 45–117)
ALT SERPL-CCNC: 26 U/L (ref 12–78)
ANION GAP SERPL CALC-SCNC: 5 MMOL/L (ref 5–15)
AST SERPL-CCNC: 22 U/L (ref 15–37)
BASOPHILS # BLD: 0.1 K/UL (ref 0–0.1)
BASOPHILS NFR BLD: 1 % (ref 0–1)
BILIRUB SERPL-MCNC: 0.5 MG/DL (ref 0.2–1)
BUN SERPL-MCNC: 4 MG/DL (ref 6–20)
BUN/CREAT SERPL: 4 (ref 12–20)
CALCIUM SERPL-MCNC: 8.8 MG/DL (ref 8.5–10.1)
CHLORIDE SERPL-SCNC: 110 MMOL/L (ref 97–108)
CO2 SERPL-SCNC: 26 MMOL/L (ref 21–32)
CREAT SERPL-MCNC: 0.91 MG/DL (ref 0.7–1.3)
DIFFERENTIAL METHOD BLD: ABNORMAL
EOSINOPHIL # BLD: 0.5 K/UL (ref 0–0.4)
EOSINOPHIL NFR BLD: 7 % (ref 0–7)
ERYTHROCYTE [DISTWIDTH] IN BLOOD BY AUTOMATED COUNT: 12.8 % (ref 11.5–14.5)
GLOBULIN SER CALC-MCNC: 2.9 G/DL (ref 2–4)
GLUCOSE SERPL-MCNC: 88 MG/DL (ref 65–100)
HCT VFR BLD AUTO: 42.9 % (ref 36.6–50.3)
HGB BLD-MCNC: 14.8 G/DL (ref 12.1–17)
IMM GRANULOCYTES # BLD AUTO: 0 K/UL (ref 0–0.04)
IMM GRANULOCYTES NFR BLD AUTO: 1 % (ref 0–0.5)
LDH SERPL L TO P-CCNC: 247 U/L (ref 85–241)
LYMPHOCYTES # BLD: 1.1 K/UL (ref 0.8–3.5)
LYMPHOCYTES NFR BLD: 16 % (ref 12–49)
MCH RBC QN AUTO: 32.4 PG (ref 26–34)
MCHC RBC AUTO-ENTMCNC: 34.5 G/DL (ref 30–36.5)
MCV RBC AUTO: 93.9 FL (ref 80–99)
MONOCYTES # BLD: 0.6 K/UL (ref 0–1)
MONOCYTES NFR BLD: 8 % (ref 5–13)
NEUTS SEG # BLD: 4.8 K/UL (ref 1.8–8)
NEUTS SEG NFR BLD: 67 % (ref 32–75)
NRBC # BLD: 0 K/UL (ref 0–0.01)
NRBC BLD-RTO: 0 PER 100 WBC
PLATELET # BLD AUTO: 222 K/UL (ref 150–400)
PMV BLD AUTO: 10.4 FL (ref 8.9–12.9)
POTASSIUM SERPL-SCNC: 2.9 MMOL/L (ref 3.5–5.1)
PROT SERPL-MCNC: 6.3 G/DL (ref 6.4–8.2)
RBC # BLD AUTO: 4.57 M/UL (ref 4.1–5.7)
SODIUM SERPL-SCNC: 141 MMOL/L (ref 136–145)
WBC # BLD AUTO: 7.2 K/UL (ref 4.1–11.1)

## 2019-10-22 PROCEDURE — 36591 DRAW BLOOD OFF VENOUS DEVICE: CPT

## 2019-10-22 PROCEDURE — 77030012965 HC NDL HUBR BBMI -A

## 2019-10-22 PROCEDURE — 83615 LACTATE (LD) (LDH) ENZYME: CPT

## 2019-10-22 PROCEDURE — 85025 COMPLETE CBC W/AUTO DIFF WBC: CPT

## 2019-10-22 PROCEDURE — 36415 COLL VENOUS BLD VENIPUNCTURE: CPT

## 2019-10-22 PROCEDURE — 80053 COMPREHEN METABOLIC PANEL: CPT

## 2019-10-22 RX ORDER — HEPARIN 100 UNIT/ML
500 SYRINGE INTRAVENOUS AS NEEDED
Status: ACTIVE | OUTPATIENT
Start: 2019-10-22 | End: 2019-10-22

## 2019-10-22 RX ORDER — SODIUM CHLORIDE 0.9 % (FLUSH) 0.9 %
5-10 SYRINGE (ML) INJECTION AS NEEDED
Status: ACTIVE | OUTPATIENT
Start: 2019-10-22 | End: 2019-10-22

## 2019-10-22 RX ORDER — POTASSIUM CHLORIDE 20 MEQ/1
40 TABLET, EXTENDED RELEASE ORAL DAILY
Qty: 60 TAB | Refills: 2 | Status: SHIPPED | OUTPATIENT
Start: 2019-10-22 | End: 2019-12-05

## 2019-10-22 RX ORDER — SODIUM CHLORIDE 9 MG/ML
10 INJECTION INTRAMUSCULAR; INTRAVENOUS; SUBCUTANEOUS AS NEEDED
Status: ACTIVE | OUTPATIENT
Start: 2019-10-22 | End: 2019-10-22

## 2019-10-22 NOTE — PROGRESS NOTES
Please notify patient that his potassium is quite low (lower than last month) at 2.9. Normal range is 3.5 - 5.1. Low potassium can cause weakness, muscle cramps and cardiac problems. Please ask if he has had any diarrhea (which can cause low potassium). Also ask him to start taking potassium chloride 40mEQ daily and eat potassium rich foods such as potatoes, tomatoes, bananas.

## 2019-10-22 NOTE — PROGRESS NOTES
[facility] VISIT NOTE    Pt arrived at [facility] ambulatory and in no distress for [facility] flush/Labs. Assessment completed, pt c/o diarrhea. Advised him to tell MD at visit today. Patient Vitals for the past 12 hrs:   Temp Pulse Resp BP   10/22/19 0938 97.5 °F (36.4 °C) 71 18 (!) 139/93     Right chest port accessed with . 75  in joshi no difficulty. Positive blood return noted and labs drawn. Tolerated treatment well, no adverse reaction noted. Port de-accessed and flushed per protocol. Positive blood return noted. D/C'd from [facility] ambulatory and in no distress. Next appointment is 11/29/19 at 10:00.

## 2019-10-23 ENCOUNTER — TELEPHONE (OUTPATIENT)
Dept: ONCOLOGY | Age: 44
End: 2019-10-23

## 2019-10-23 DIAGNOSIS — E87.6 HYPOKALEMIA DUE TO EXCESSIVE GASTROINTESTINAL LOSS OF POTASSIUM: Primary | ICD-10-CM

## 2019-10-23 NOTE — TELEPHONE ENCOUNTER
10/23/19 10:15 AM Spoke with patient regarding recent labs. Notified patient his potassium is low. Patient reports 2 months of loose stools x 3 per day. Reports eating foods high in K. Advised patient needs to start taking 40 meq K daily as well as food high in K. Advised patient increase fiber in diet, including adding metamucil and eating foods high in fiber. Patient reports one episode of blood in stool. Advised I will put referral in to GI and I will call patient next week to see if improvement in loose stools.

## 2019-10-31 ENCOUNTER — TELEPHONE (OUTPATIENT)
Dept: ONCOLOGY | Age: 44
End: 2019-10-31

## 2019-10-31 NOTE — TELEPHONE ENCOUNTER
[address] at [facility]  [phone]        10/31/19 11:36 AM Attempted to call patient via home/cell number. Left message requesting that he return call. Need to notify patient of appointment change. Patient originally scheduled for 11/29 for port flush; however, office is closed. Rescheduled port flush/labs in conjunction with office visit on 12/05. Also would like to verify that patient's loose stools have improved since implementing changes suggested by Annalee Orozco NP. If not will refer to GI, per Annalee Orozco. Provided office phone number for patient to call back. 11/01/19 11:24 AM Spoke with patient. Informed him of above appointment change. Patient also stated his loose stools have improved slightly since adding in more fiber to diet. However, still having about 2 loose stools per day. While on phone, patient also shared that the potassium pills he was prescribed are difficult for him to swallow--says he breaks pill into thirds. Verified patient is taking 20mEQ pills, 2 tablets daily. Patient requesting 10mEQ pills which are smaller. He is aware that he would have to take 4 pills daily to obtain prescribed dose. Advised this nurse would defer above to Annalee Orozco for approval and further recommendations regarding loose stools and call patient back. He verbalized understanding. 11/04/19 4:15 PM Called patient back. Advised, per Annalee Orozco, that she would like for patient to have his potassium levels rechecked this week. Explained that patient might be able to stop potassium pills pending results. But, Annalee Orozco can call in smaller pills if needed, should patient need to continue medication. Patient verbalized understanding and is agreeable to have repeat labs. States he will go Friday, 11/08. Faxed order to [facility] per his request. No further questions or concerns at this time.

## 2019-11-04 DIAGNOSIS — E87.6 HYPOKALEMIA: Primary | ICD-10-CM

## 2019-11-13 ENCOUNTER — TELEPHONE (OUTPATIENT)
Dept: ONCOLOGY | Age: 44
End: 2019-11-13

## 2019-11-13 DIAGNOSIS — F41.9 ANXIETY: ICD-10-CM

## 2019-11-13 NOTE — TELEPHONE ENCOUNTER
Returned call to Mr Ximena Milan. He is requesting refill on the xanax 1 mg tab. The medication was last filled on 10/17/19 for 90/30. He has 10 tabs left taking 3 a day. He was last seen by PM on 9/5/19. I offered him a follow up appointment with Dr Monica Martinez on November 19 at 8:30 AM at the [facility] office. He stated it will be alright. Appointment has been scheduled in [place]. I informed him I will need to send a message to Dr Monica Martinez with regards to the refill, verified the Pharmacy would like sent to the [facility]. I will call Mr Ximena Milan once approved.

## 2019-11-13 NOTE — TELEPHONE ENCOUNTER
Received return call from patient. States he has not had an opportunity to have labs drawn yet. Voices stress as father recently passed away. States he will make an effort to have labs drawn sometime this week. Confirms he continues to take potassium pills as prescribed.  Will update Nikky Orantes NP.

## 2019-11-14 ENCOUNTER — TELEPHONE (OUTPATIENT)
Dept: PALLATIVE CARE | Age: 44
End: 2019-11-14

## 2019-11-14 RX ORDER — ALPRAZOLAM 1 MG/1
1 TABLET ORAL
Qty: 90 TAB | Refills: 0 | Status: SHIPPED | OUTPATIENT
Start: 2019-11-14 | End: 2019-12-05 | Stop reason: SDUPTHER

## 2019-11-14 NOTE — TELEPHONE ENCOUNTER
Called patient to advise/confirm upcoming appt with Dr. Smiley Dias on   11/19/19   at  8:30am at [facility]. Pt  Confirmed. Also advised to please bring in your Drivers License and Insurance Card with you to appointment as well as any prescription pain medication in the original container with you to appt.

## 2019-11-19 ENCOUNTER — NURSE NAVIGATOR (OUTPATIENT)
Dept: PALLATIVE CARE | Age: 44
End: 2019-11-19

## 2019-11-19 ENCOUNTER — DOCUMENTATION ONLY (OUTPATIENT)
Dept: PALLATIVE CARE | Age: 44
End: 2019-11-19

## 2019-11-19 NOTE — PROGRESS NOTES
Call made to Mr Herminio Quezada with regards to today appointment. He stated he forgot although appointment was confirmed. Appointment will need to be rescheduled.

## 2019-12-04 ENCOUNTER — TELEPHONE (OUTPATIENT)
Dept: PALLATIVE CARE | Age: 44
End: 2019-12-04

## 2019-12-04 NOTE — PROGRESS NOTES
[address] [facility] Oncology at [address] [place] [address]  [phone]    Hematology / Oncology Established Visit    Reason for Visit:   Chris Escobar is a 45 y.o. male who comes in for f/u of lymphoma. Hematology Oncology Treatment History:     Diagnosis: Follicular lymphoma    Stage: IV    Pathology:   11/13/18 right inguinal LN excision: Follicular lymphoma, high-grade (grade 3a of 3). Comment   The delaney architecture is entirely effaced by atypical lymphocytic proliferation with prominent nodular growth pattern. The majority of the atypical lymphocytes are small to medium in size and have irregular nuclear outlines and inconspicuous nucleoli, morphologically consistent with centrocytes. Scattered large lymphocytes with prominent nucleoli, consistent with centroblasts are identified (> 15 per high-power field). Focal increase in mitotic figures is noted. Occasional follicular dendritic cells are seen. By immunohistochemistry, the atypical lymphocytes are positive for CD20, PAX5, CD10, BCL6 and BCL2 (weak, focal) and negative for MUM1. CD3, CD5 and CD43 stain numerous background T lymphocytes. Ki-67 reveals a high proliferation index in the neoplastic follicles (overall 23-18%). Clinical history indicates 14 cm retroperitoneal mass with bilateral inguinal lymphadenopathy. In summary, the combined morphologic and phenotypic findings are diagnostic of a high-grade follicular lymphoma (grade 3a of 3). There is no evidence of diffuse large B-cell lymphoma. Flow cytometry analysis:   Monoclonal B-cell population (47 % of all cells) with mild increase in side and forward scatter properties expressing CD19, CD20, CD23 and CD10 with surface lambda light chain restriction. No phenotypically aberrant T-cell population. Flow cytometry was performed at [facility]     Prior Treatment:   1. Obinutuzumab-CHOP.  Obinutuzumab: 1000 mg weekly on days 1, 8, 15 for cycle 1, then 1000 mg on day 1 q21 days for cycles 2-6, then monotherapy 1000 mg every 21 days for cycle 7, 8 with Cyclophosphamide 750mg/m2, Doxorubicin 50mg/m2, Vincristine 1.4mg/m2 on day 1 and Prednisone 100mg on Days 1-5, every 21 days for a total of 2 cycles completed late 1/2019. Regimen discontinued due to NSTEMI. 2. Obinutuzumab + Bendamustine: 1000 mg Obinutuzumab on day 1 + Bendamustine 90mg/m2 on days 1-2 on a 28-day cycle x 4 cycles, completed 5/2019    Current Treatment: Surveillance      Oncologic History:  He states he was in his usual state of health in early November 2018 when he developed low back pain and presented to the ER. The pain was described as constant, radiating to the buttocks, without exacerbating or alleviating factors, associated w/ increased urination, no n/v/d, no dysuria, no fever, no significant weight loss at first, but he did later report 15-lb loss over 1 month due to low appetite. Work-up at outside hospital revealed a retroperitoneal mass seen on CT imaging, and he was transferred to [address] [place] [address] for further work-up. CT there showed a large retroperitoneal mass encircling the aorta with invasion of the left renal hilum and left adrenal gland. There were bilateral inguinal lymph nodes and moderate left hydronephrosis. He was evaluated while at [address] [place] [address] and was noted to have palpable nodes in his groin for approximately the past 1 month. No testicular mass, anorexia, weight loss, fevers, night sweats, chest pain, shortness of breath, abdominal pain or nausea. He underwent excisional LN biopsy of right inguinal LN. He was told that he had likely lymphoma. He was advised to follow up as outpatient for PET scan, bone marrow biopsy. However, patient states he was lost to f/u. He never received any calls or appointment details. His aunt urged patient to seek care elsewhere and his PCP recommended [facility].  At our first meeting on 12/13/18, he tells me that he was working full time, feeling well until early Nov 2018. When he developed the left lower back pain, he went to [facility] and [facility]. At time of diagnosis, he had no cardiac disease aside from HTN, hyperlipidemia. However, he did have an NSTEMI after cycle 2 of O-CHOP. Was likely unrelated to chemotherapy, but opted to switch treatment to Obinutuzumab-Bendamustine. He completed treatment in 5/2019 and had a CR based on PET. History of Present Illness:   Mr. Zapata is a 45 y.o. male with HTN who comes in for follow up of Follicular lymphoma, now on surveillance. His dad passed away this year from pancreatic cancer, and patient remains sad. He reports ongoing lower back pain. Reports he was started on gabapentin. He can't take ibuprofen due to SE of HTN. Reports tylenol does not work. Reports he \"can't do anything\" due to his lower back pain. He has not seen an orthopedic specialist. He was discharged from physical therapy because he states \"it wasn't helping. \" Denies dizziness or lightheadedness. Reports intermittent chest pain, no CP currently. Reports taking all of his cardiac medications. Reports eating and drinking well. Reports intermittent diarrhea; but improved with addition of fiber. He is still smoking. FAMILY HISTORY:  His father passed away from pancreatic cancer and also had a history of leukemia.     Past Medical History:   Diagnosis Date    Anxiety     Cancer [facility])     lymphoma Nov 2018 receiving chemo    Chronic pain     lower back- lymphoma    Hyperlipidemia     Hypertension     Lymphadenopathy 11/12/2018      Past Surgical History:   Procedure Laterality Date    HX HEART CATHETERIZATION  02/2019    HX OTHER SURGICAL  02/2019    cardiac stent    IR INJECTION PSEUDOANEURYSM  2/26/2019      Social History     Tobacco Use    Smoking status: Current Every Day Smoker     Packs/day: 0.50     Years: 20.00     Pack years: 10.00    Smokeless tobacco: Never Used   Substance Use Topics    Alcohol use: No     Frequency: Never   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 Family History   Problem Relation Age of Onset    Hypertension Father     Diabetes Father     Diabetes Mother    Father had leukemia. Current Outpatient Medications   Medication Sig    ALPRAZolam (XANAX) 1 mg tablet Take 1 Tab by mouth three (3) times daily as needed for Anxiety for up to 30 days. Max Daily Amount: 3 mg. Indications: anxious    potassium chloride (K-DUR, KLOR-CON) 20 mEq tablet Take 2 Tabs by mouth daily.  metoprolol succinate (TOPROL-XL) 50 mg XL tablet Take 1 Tab by mouth daily.  lisinopril (PRINIVIL, ZESTRIL) 20 mg tablet Take 1 Tab by mouth daily.  CHANTIX 1 mg tablet TAKE 1 TABLET BY MOUTH TWICE A DAY (Patient not taking: Reported on 10/18/2019)    escitalopram oxalate (LEXAPRO) 10 mg tablet Take 1 Tab by mouth daily.  atorvastatin (LIPITOR) 40 mg tablet Take 1 Tab by mouth nightly. Indications: treatment to slow progression of coronary artery disease    clopidogrel (PLAVIX) 75 mg tab 1 Tab by Per NG tube route daily.  aspirin delayed-release (ASPIR-81) 81 mg tablet Take 1 Tab by mouth daily. No current facility-administered medications for this visit. No Known Allergies     Review of Systems: A complete review of systems was obtained, negative except as described above.     Physical Exam:     Visit Vitals  /88   Pulse 71   Temp 97.5 °F (36.4 °C) (Oral)   Resp 16   Ht 5' 7\" (1.702 m)   Wt 158 lb (71.7 kg)   SpO2 98%   BMI 24.75 kg/m²     ECOG PS: 0  General: Well developed, no acute distress, smells like cigarette smoke  Eyes: PERRLA, EOMI, anicteric sclerae  HENT: Atraumatic, OP clear, poor dentition,TMs intact without erythema  Neck: Supple  Lymphatic: No cervical, supraclavicular, axillary, inguinal lymphadenopathy  Respiratory: CTAB, normal respiratory effort  CV: Normal rate, regular rhythm, no murmurs, no peripheral edema  GI: Soft, nontender, nondistended, no masses, no hepatomegaly, no splenomegaly  MS: Normal gait and station. Digits without clubbing or cyanosis. Skin: No rashes, ecchymoses, or petechiae. Normal temperature, turgor, and texture. Neuro/Psych: Alert, oriented. 5/5 strength in all 4 extremities. Appropriate affect, normal judgment/insight. Results:     Lab Results   Component Value Date/Time    WBC 6.4 12/05/2019 10:45 AM    HGB 13.3 12/05/2019 10:45 AM    HCT 39.4 12/05/2019 10:45 AM    PLATELET 335 22/17/4576 10:45 AM    MCV 99.2 (H) 12/05/2019 10:45 AM    ABS. NEUTROPHILS 3.9 12/05/2019 10:45 AM    Hemoglobin (POC) 15.0 06/05/2009 02:13 PM    Hematocrit (POC) 39 02/14/2019 01:24 PM     Lab Results   Component Value Date/Time    Sodium 142 12/05/2019 10:45 AM    Potassium 2.8 (L) 12/05/2019 10:45 AM    Chloride 108 12/05/2019 10:45 AM    CO2 28 12/05/2019 10:45 AM    Glucose 126 (H) 12/05/2019 10:45 AM    BUN 5 (L) 12/05/2019 10:45 AM    Creatinine 1.05 12/05/2019 10:45 AM    GFR est AA >60 12/05/2019 10:45 AM    GFR est non-AA >60 12/05/2019 10:45 AM    Calcium 8.5 12/05/2019 10:45 AM    Sodium (POC) 136 02/14/2019 01:24 PM    Potassium (POC) 3.9 02/14/2019 01:24 PM    Chloride (POC) 102 02/14/2019 01:24 PM    Glucose (POC) 249 (H) 02/15/2019 10:21 PM    BUN (POC) 14 02/14/2019 01:24 PM    Creatinine (POC) 0.9 02/14/2019 01:24 PM    Calcium, ionized (POC) 1.24 02/14/2019 01:24 PM     Lab Results   Component Value Date/Time    Bilirubin, total 0.4 12/05/2019 10:45 AM    ALT (SGPT) 27 12/05/2019 10:45 AM    AST (SGOT) 19 12/05/2019 10:45 AM    Alk.  phosphatase 159 (H) 12/05/2019 10:45 AM    Protein, total 6.1 (L) 12/05/2019 10:45 AM    Albumin 3.2 (L) 12/05/2019 10:45 AM    Globulin 2.9 12/05/2019 10:45 AM     No results found for: IRON, FE, TIBC, IBCT, PSAT, FERR    No results found for: B12LT, FOL, RBCF  Lab Results   Component Value Date/Time    TSH 1.53 09/28/2016 04:40 AM     11/11/18:     Lab Results   Component Value Date/Time    Hepatitis A, IgM NONREACTIVE 12/27/2018 04:53 PM Hepatitis B surface Ag <0.10 2018 04:53 PM    Hepatitis B core, IgM NONREACTIVE 2018 04:53 PM         Imagin/9/18 Abd/pelvis CT: IMPRESSION:  1. Interval development of a large retroperitoneal mass encircling the aorta with invasion of the left renal hilum and left adrenal gland. Several adjacent lymph nodes are seen extending into the peritoneum and underneath the  diaphragmatic natalie. This most likely represents lymphoma. 2. Several new bilateral enlarged inguinal lymph nodes also likely representing lymphoma. 3. Moderate left hydronephrosis with a delayed renal nephrogram related to decreased renal function. This is related to the invasion of the renal hilum. 18 Chest CT: IMPRESSION:  Trace left pleural effusion. Bilateral lower lobe atelectasis. Large  retroperitoneal mass lesion again demonstrated. PET 18:  FINDINGS:  HEAD/NECK: Right palatine tonsil intense hypermetabolism, max SUV 18. Multilevel  bilateral cervical adenopathy, with max SUV 12 in a left supraclavicular node. Cerebral evaluation is limited by normal intense activity. CHEST: Solitary hypermetabolic left axillary node, max SUV 11. ABDOMEN/PELVIS: Bulky retroperitoneal mass max SUV 27, with several additional  small active abdomino-pelvic nodes. Bilateral inguinal nodes with max SUV 12 on  the left. SKELETON: No foci of abnormal hypermetabolism in the axial and visualized  appendicular skeleton. IMPRESSION:   1. Right palatine tonsil tumor involvement (Deauville 5). 2. Bilateral cervical delaney involvement (Deauville 5). 3. Left axillary node involvement (Deauville 5). 4. Bulky retroperitoneal lymphoma mass and additional smaller hypermetabolic  abdomino-pelvic nodes (Deauville 5). 5. Bilateral inguinal delaney involvement (Deauville 5). Deauville Five Point Scale  1. No uptake or no residual uptake (when used interim)  2. Slight uptake, but below blood pool (mediastinum)  3.  Uptake above mediastinal, but below/equal to uptake in the liver  4. Uptake slightly to moderately higher than liver  5. Markedly increased uptake or any new lesion (on response evaluation)  Each FDG-avid (or previously FDG avid) lesion is rated independently. Reference values:  Mediastinal blood pool: 2.1 SUV  Liver (background): 2.2 SUV    PET/CT 2/05/19:   IMPRESSION:  1. No Foci of Abnormal Hypermetabolism (Deauville 1). 2. Resolved activity in the right palatine tonsil, bilateral cervical nodes,left axillary node, retroperitoneal/abdominal pelvic adenopathy, bilateral inguinal nodes. Echo 2/14/19:  Normal cavity size, wall thickness and systolic function (ejection fraction normal). The muscle mass is normal. The cavity shape is normal. The estimated ejection fraction is 41 - 45%. Abnormal wall motion as described on the wall scoring diagram below. End-systolic volume is normal. Normal left ventricular strain. There is mild (grade 1) left ventricular diastolic dysfunction. Normal left ventricular diastolic pressure. End-diastolic volume is normal.    LE arterial duplex 2/22/19:  There is evidence of left groin pseudoaneurysm noted arising from distal common femoral artery, pseudo lobe measures 2.32cm x 2.58cm and pseudo neck length measuring 0.63cm. There is no evidence of hemodynamically significant left lower extremity arterial obstruction. JACLYN is 1.03 on the right and 1.02 on the left. LE arterial duplex s/p Thrombin Injection to Pseudoaneurysm 2/26/19:  Successful thrombin injection procedure of the left groin with no further flow seen. No evidence of hemodnyamically significant obstruction in the left lower extremity. Left lower extremity arterial duplex performed. Confirmed pseudoaneurysm in left groin with small neck. Following thrombin injection, no further flow seen in the pseudoaneurysm.   The left common femoral, profunda femoral, femoral, popliteal, posterior tibial and anterior arteries were imaged. Mainly triphasic flow was seen with no evidence of significantly elevated velocities. Repeat LE arterial duplex 2/27/19:  Continued thrombosed left groin pseudoaneurysm following thrombin injection on 02/26/2019. No flow or color fill is identified. The hematoma measures approximately 2.1 x 2.9 cm in diameter. The common femoral, deep femoral, femoral, and popliteal arteries are patent with mainly tri-phasic flow and no significant hemodynamically obstruction is noted. Stress 5/31/19:  · Normal stress myocardial perfusion without ischemia or infact at 84% MPHR. Normal LV function. LVEF 60%. · No EKG changes of ischemia at peak exercise. · Normal functional capacity. PET 6/03/19: IMPRESSION: No Foci of Abnormal Hypermetabolism (Deauville 1).      Assessment & Plan:   Lizzie Aragon is a 45 y.o. male comes in for evaluation and management of lymphoma. 1. Follicular lymphoma: Grade 3a. Although grade 3a disease is considered more indolent and can be treated like grade 1/2 disease, this patient has indications for treatment: bulky disease encircling the aorta causing symptoms. Bone marrow negative for lymphoma, but was hypercellular. BR better than RCHOP, but based on GALLIUM study, Obinutuzumab-based induction and maintenance prolongs PFS over that seen with rituximab-based therapy. Therefore, I have chosen to treat patient with O-CHOP regimen for 6 cycles, followed by possible maintenance Obinutuzumab. We discussed the risks and benefits of O-CHOP chemotherapy. The potential side effects include, but are not limited to: nausea, vomiting, diarrhea, constipation, Flu-like symptoms, infusion reaction, allergic reaction, flushing, taste changes, increased risk of infection, anemia, fatigue, alopecia, neuropathy, nail changes, cardiac damage, mucositis, myleosuppression, infertility and rarely, death. Patient completed chemoteaching and received a chemotherapy education folder.  CR after 2 cycles of O-CHOP, but switched regiment Bendamustine-O due to cardiac event. Chemotherapy consent signed and patient educated about side effects including: cytopenias, infections, bleeding, n/v/d, hair loss, skin rash, secondary malignancy, kidney or liver toxicity. Completed a total of 4 cycles of Bendamustine-O, completed 5/2019. PET completed 6/3/19 showed CR.   -- Surveillance labs, MD visit every 3 months, and CT imaging q6mo x 2 yrs. -- 6 weeks port flush  -- Follow up in 3 months labs, MD visit. -- Repeat CT c/a/p w contrast NOW - call patient with results. If CT is negative, will get port removed. 2. Use of cardiotoxic chemotherapy: Discussed risks/benefits of using doxorubicin. Echo on 12/17/18 showed EF 65%, but drop to EF 45% immediately after MI. Followed by Dr. Ximena Gandhi. 3. Chronic lumbar back pain/anxiety: Left lower back pain. No longer taking Oxycodone 5mg. Signed pain contract on 12/28/18 and following with Dr. Yeimi Medellin. Discharged from PT with [facility]- patient reports \"not working. \" Reports the back pain is preventing him from work and he will be homeless soon if he does not find a job. Dr. Yeimi Medellin to order MRI of back and start patient on a short course of opioids. If MRI is unrevealing Dr. Yeimi Medellin will discontinue opioids. Gabapentin d/c.   -- Follow up with Dr. Yeimi Medellin who is ordering MRI. -- Placed referral to Orthopedic specialist to evaluate chronic back pain. -- Ativan 0.5mg bid taking TID  -- Lexapro 10mg daily for anxiety. -- Tylenol 500 mg every 4 hours- patient states it does not work. 4. HTN / Hyperlipidemia: Well controlled today on BB, ACEI. Elevated on 9/5/19 advised patient to check at home and call with results. 5. Tobacco abuse: Reiterated strong recommendation for smoking cessation in the setting of recent MI. Discussed cessation strategies and pt does not want Wellbutrin given past experience with it.  Previously did well on Chantix 1mg bid and had cut down to 8 cigs/day. Patient stopped taking chantix because \"it didn't work. \"     6. Poor dentition: Resulted in dental infection and neutropenic fever at start of treatment requiring IV antibiotics. No abscess. Was evaluated later on by [facility] and had several teeth pulled. Followed up with FS 10/2/2019 and had multiple teeth pulled again. -- Follow up with [facility] 2/2020      7. H/o STEMI / Cardiac arrest: P/w CP on 2/14/19 followed by collapse and cardiac arrest. Cardiac cath at [facility] by Dr. Carlos Gentile revealed 90-95% occlusion of proximal LAD, TOM placed. Dr. Carlos Gentile [phone]. Dr. Daniela Chaney recommends: Continue aspirin and Plavix along with high-dose Lipitor and metoprolol. 5/31/19 Stress test normal with EF 65%. 7/29 appointment with Marjorie, patient went to appointment but stated he was \"forgotten about,\" and will make a follow up this month (September). -- f/u Dr. Daniela Chaney in September 2020.  -- On dual antiplatelet therapy aspirin and clopidogrel  -- On BB, ACEI, statin       8. Hypokalemia: Remains low at 2.8 with 12/5/19 labs. Advised patient increase fiber in diet to help with loose stools and eat foods high in K. He was asked to take potassium 40 meq daily but stopped taking them because they are too big. Reports loose stools have decreased since adding fiber. Could be medication related; lexapro <1% SE of hypokalemia or due to diarrhea. -- Potassium 40 meq daily (called in 10 meq tabs for a smaller size). -- Foods high in K (sweet potatoes, spinach etc)     Emotional well being: Pt is coping well with his/her disease and has excellent support. Met with SW in the past as well as today. I appreciate the opportunity to participate in Mr. Bradly Oliveira Sr's care.     Patient was seen in conjunction with Mesfin Strong NP    Signed By: Tr Shine MD     December 5, 2019

## 2019-12-05 ENCOUNTER — TELEPHONE (OUTPATIENT)
Dept: ONCOLOGY | Age: 44
End: 2019-12-05

## 2019-12-05 ENCOUNTER — HOSPITAL ENCOUNTER (OUTPATIENT)
Dept: INFUSION THERAPY | Age: 44
Discharge: HOME | End: 2019-12-05
Payer: COMMERCIAL

## 2019-12-05 ENCOUNTER — OFFICE VISIT (OUTPATIENT)
Dept: PALLATIVE CARE | Age: 44
End: 2019-12-05

## 2019-12-05 ENCOUNTER — NURSE NAVIGATOR (OUTPATIENT)
Dept: PALLATIVE CARE | Age: 44
End: 2019-12-05

## 2019-12-05 ENCOUNTER — OFFICE VISIT (OUTPATIENT)
Dept: ONCOLOGY | Age: 44
End: 2019-12-05

## 2019-12-05 VITALS
RESPIRATION RATE: 16 BRPM | HEIGHT: 67 IN | TEMPERATURE: 97.5 F | HEART RATE: 71 BPM | OXYGEN SATURATION: 98 % | DIASTOLIC BLOOD PRESSURE: 88 MMHG | WEIGHT: 158 LBS | SYSTOLIC BLOOD PRESSURE: 157 MMHG | BODY MASS INDEX: 24.8 KG/M2

## 2019-12-05 VITALS
HEART RATE: 68 BPM | HEIGHT: 67 IN | OXYGEN SATURATION: 98 % | WEIGHT: 157.4 LBS | DIASTOLIC BLOOD PRESSURE: 98 MMHG | BODY MASS INDEX: 24.71 KG/M2 | SYSTOLIC BLOOD PRESSURE: 157 MMHG | TEMPERATURE: 97.3 F | RESPIRATION RATE: 19 BRPM

## 2019-12-05 VITALS
WEIGHT: 158.3 LBS | HEART RATE: 71 BPM | DIASTOLIC BLOOD PRESSURE: 88 MMHG | RESPIRATION RATE: 16 BRPM | OXYGEN SATURATION: 97 % | BODY MASS INDEX: 24.79 KG/M2 | TEMPERATURE: 97.9 F | SYSTOLIC BLOOD PRESSURE: 157 MMHG

## 2019-12-05 DIAGNOSIS — M54.50 CHRONIC BILATERAL LOW BACK PAIN WITHOUT SCIATICA: ICD-10-CM

## 2019-12-05 DIAGNOSIS — E87.6 HYPOKALEMIA: ICD-10-CM

## 2019-12-05 DIAGNOSIS — F41.9 ANXIETY: ICD-10-CM

## 2019-12-05 DIAGNOSIS — C82.33 FOLLICULAR LYMPHOMA GRADE IIIA OF INTRA-ABDOMINAL LYMPH NODES (HCC): ICD-10-CM

## 2019-12-05 DIAGNOSIS — Z85.72 HISTORY OF FOLLICULAR LYMPHOMA: Primary | ICD-10-CM

## 2019-12-05 DIAGNOSIS — R19.7 DIARRHEA, UNSPECIFIED TYPE: ICD-10-CM

## 2019-12-05 DIAGNOSIS — M54.50 CHRONIC LEFT-SIDED LOW BACK PAIN WITHOUT SCIATICA: Primary | ICD-10-CM

## 2019-12-05 DIAGNOSIS — G89.29 CHRONIC LEFT-SIDED LOW BACK PAIN WITHOUT SCIATICA: Primary | ICD-10-CM

## 2019-12-05 DIAGNOSIS — C82.90 FOLLICULAR LYMPHOMA, UNSPECIFIED FOLLICULAR LYMPHOMA TYPE, UNSPECIFIED BODY REGION (HCC): Primary | ICD-10-CM

## 2019-12-05 DIAGNOSIS — G89.29 CHRONIC BILATERAL LOW BACK PAIN WITHOUT SCIATICA: ICD-10-CM

## 2019-12-05 DIAGNOSIS — I10 HYPERTENSION, UNSPECIFIED TYPE: ICD-10-CM

## 2019-12-05 LAB
ALBUMIN SERPL-MCNC: 3.2 G/DL (ref 3.5–5)
ALBUMIN/GLOB SERPL: 1.1 {RATIO} (ref 1.1–2.2)
ALP SERPL-CCNC: 159 U/L (ref 45–117)
ALT SERPL-CCNC: 27 U/L (ref 12–78)
ANION GAP SERPL CALC-SCNC: 6 MMOL/L (ref 5–15)
AST SERPL-CCNC: 19 U/L (ref 15–37)
BASOPHILS # BLD: 0.1 K/UL (ref 0–0.1)
BASOPHILS NFR BLD: 1 % (ref 0–1)
BILIRUB SERPL-MCNC: 0.4 MG/DL (ref 0.2–1)
BUN SERPL-MCNC: 5 MG/DL (ref 6–20)
BUN/CREAT SERPL: 5 (ref 12–20)
CALCIUM SERPL-MCNC: 8.5 MG/DL (ref 8.5–10.1)
CHLORIDE SERPL-SCNC: 108 MMOL/L (ref 97–108)
CO2 SERPL-SCNC: 28 MMOL/L (ref 21–32)
CREAT SERPL-MCNC: 1.05 MG/DL (ref 0.7–1.3)
DIFFERENTIAL METHOD BLD: ABNORMAL
EOSINOPHIL # BLD: 0.5 K/UL (ref 0–0.4)
EOSINOPHIL NFR BLD: 8 % (ref 0–7)
ERYTHROCYTE [DISTWIDTH] IN BLOOD BY AUTOMATED COUNT: 15.7 % (ref 11.5–14.5)
GLOBULIN SER CALC-MCNC: 2.9 G/DL (ref 2–4)
GLUCOSE SERPL-MCNC: 126 MG/DL (ref 65–100)
HCT VFR BLD AUTO: 39.4 % (ref 36.6–50.3)
HGB BLD-MCNC: 13.3 G/DL (ref 12.1–17)
IMM GRANULOCYTES # BLD AUTO: 0.1 K/UL (ref 0–0.04)
IMM GRANULOCYTES NFR BLD AUTO: 1 % (ref 0–0.5)
LDH SERPL L TO P-CCNC: 243 U/L (ref 85–241)
LYMPHOCYTES # BLD: 1.3 K/UL (ref 0.8–3.5)
LYMPHOCYTES NFR BLD: 21 % (ref 12–49)
MCH RBC QN AUTO: 33.5 PG (ref 26–34)
MCHC RBC AUTO-ENTMCNC: 33.8 G/DL (ref 30–36.5)
MCV RBC AUTO: 99.2 FL (ref 80–99)
MONOCYTES # BLD: 0.5 K/UL (ref 0–1)
MONOCYTES NFR BLD: 8 % (ref 5–13)
NEUTS SEG # BLD: 3.9 K/UL (ref 1.8–8)
NEUTS SEG NFR BLD: 61 % (ref 32–75)
NRBC # BLD: 0 K/UL (ref 0–0.01)
NRBC BLD-RTO: 0 PER 100 WBC
PLATELET # BLD AUTO: 249 K/UL (ref 150–400)
PMV BLD AUTO: 10.6 FL (ref 8.9–12.9)
POTASSIUM SERPL-SCNC: 2.8 MMOL/L (ref 3.5–5.1)
PROT SERPL-MCNC: 6.1 G/DL (ref 6.4–8.2)
RBC # BLD AUTO: 3.97 M/UL (ref 4.1–5.7)
SODIUM SERPL-SCNC: 142 MMOL/L (ref 136–145)
WBC # BLD AUTO: 6.4 K/UL (ref 4.1–11.1)

## 2019-12-05 PROCEDURE — 74011250636 HC RX REV CODE- 250/636: Performed by: NURSE PRACTITIONER

## 2019-12-05 PROCEDURE — 77030012965 HC NDL HUBR BBMI -A

## 2019-12-05 PROCEDURE — 36591 DRAW BLOOD OFF VENOUS DEVICE: CPT

## 2019-12-05 PROCEDURE — 83615 LACTATE (LD) (LDH) ENZYME: CPT

## 2019-12-05 PROCEDURE — 85025 COMPLETE CBC W/AUTO DIFF WBC: CPT

## 2019-12-05 PROCEDURE — 80053 COMPREHEN METABOLIC PANEL: CPT

## 2019-12-05 PROCEDURE — 36415 COLL VENOUS BLD VENIPUNCTURE: CPT

## 2019-12-05 RX ORDER — SODIUM CHLORIDE 9 MG/ML
10 INJECTION INTRAMUSCULAR; INTRAVENOUS; SUBCUTANEOUS AS NEEDED
Status: ACTIVE | OUTPATIENT
Start: 2019-12-05 | End: 2019-12-05

## 2019-12-05 RX ORDER — OXYCODONE HYDROCHLORIDE 5 MG/1
5 TABLET ORAL
Qty: 80 TAB | Refills: 0 | Status: SHIPPED | OUTPATIENT
Start: 2019-12-05 | End: 2020-01-03 | Stop reason: SDUPTHER

## 2019-12-05 RX ORDER — GABAPENTIN 300 MG/1
CAPSULE ORAL
Refills: 3 | COMMUNITY
Start: 2019-11-15 | End: 2020-01-07

## 2019-12-05 RX ORDER — SODIUM CHLORIDE 0.9 % (FLUSH) 0.9 %
5-10 SYRINGE (ML) INJECTION AS NEEDED
Status: ACTIVE | OUTPATIENT
Start: 2019-12-05 | End: 2019-12-05

## 2019-12-05 RX ORDER — HEPARIN 100 UNIT/ML
500 SYRINGE INTRAVENOUS AS NEEDED
Status: ACTIVE | OUTPATIENT
Start: 2019-12-05 | End: 2019-12-05

## 2019-12-05 RX ORDER — ALPRAZOLAM 1 MG/1
1 TABLET ORAL
Qty: 90 TAB | Refills: 0 | Status: SHIPPED | OUTPATIENT
Start: 2019-12-13 | End: 2020-01-07 | Stop reason: SDUPTHER

## 2019-12-05 RX ORDER — POTASSIUM CHLORIDE 750 MG/1
40 TABLET, EXTENDED RELEASE ORAL DAILY
Qty: 120 TAB | Refills: 1 | Status: SHIPPED | OUTPATIENT
Start: 2019-12-05 | End: 2020-03-10 | Stop reason: SDUPTHER

## 2019-12-05 RX ADMIN — Medication 500 UNITS: at 10:53

## 2019-12-05 RX ADMIN — Medication 10 ML: at 10:52

## 2019-12-05 NOTE — PROGRESS NOTES
Palliative Medicine Outpatient Services  [place]: [phone] ([phone])    Patient Name: Denisse Seth  YOB: 1975    Date of Current Visit: 12/05/19  Location of Current Visit:    [] [facility] Office  [x] [facility] Office  [] [address] [address] [facility] [place] Office  [] Home  [] Other:      Date of Initial Visit: 2/19/19   Referral from: Eyad Lanier MD  Primary Care Physician: None      SUMMARY:   Denisse Seth is a 45y.o. year old with high-grade follicular lymphoma, who was referred to Palliative Medicine by Dr. Sheri Espinoza for symptom management and supportive care. He was diagnosed in 11/2018 after presenting with back pain. He is currently receiving systemic chemotherapy. He suffered cardiac arrest during cycle #3 due to previously undiagnosed severe stenosis of the LAD s/p PTCA and stent. He has since completed systemic chemotherapy. His most recent PET-CT (5/2019) showed no focal areas of increased hypermetabolic uptake. The patients social history includes: he is single. He lives with his father in [place]. He has 3 teenage children who live with their mother and with whom he has close contact. 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 He's applied for disability. Palliative Medicine is addressing the following current patient/family concerns: anxiety, depression, left mid- and low back pain, poor appetite, fatigue, advanced care planning. Initial Referral Intake note from Unknown Feeling RN reviewed prior to visit   PALLIATIVE DIAGNOSES:       ICD-10-CM ICD-9-CM    1. Chronic left-sided low back pain without sciatica M54.5 724.2 oxyCODONE IR (ROXICODONE) 5 mg immediate release tablet    G89.29 338.29    2. Anxiety F41.9 300.00 ALPRAZolam (XANAX) 1 mg tablet   3. Hypertension, unspecified type I10 401.9    4.  Follicular lymphoma grade IIIa of intra-abdominal lymph nodes (HCC) C82.33 202.03 oxyCODONE IR (ROXICODONE) 5 mg immediate release tablet          PLAN:   Patient Instructions     Dear Denisse Dorinda Umm was a pleasure seeing you today in [facility]. We will see you again in 4 weeks    If labs or imaging tests have been ordered for you today, please call the office at [phone] 48 hours after completion to obtain the results. Your described symptoms were: Fatigue: 3 Drowsiness: 3   Depression: 4 Pain: 10   Anxiety: 7 Nausea: 0   Anorexia: 0 Dyspnea: 0   Best Well-Bein Constipation: No   Other Problem (Comment): 0       This is the plan we talked about:      1. Pain  -You continue to have pain in your left lower back  -Your most recent scans showed you've had a complete response to chemotherapy  -I'm ordered an MRI of your lumbar spine to see if you have any lumbar spine degenerative disease or nerve root compression  -Dr. Briana Jarquin ordered repeat CT scans  -Gabapentin didn't help so I'm going to taper you off this medication   -Take 1 cap twice daily for 3 days, then   -Take 1 cap daily for 3 days, hten stop  -Start oxycodone 5-mg every 4 hours as needed.  -Today I prescribed 80 pills to last for the next month  -If the above scans are negative, I may taper you off opioids   -Continue tylenol as needed  -Avoid taking ibuprofen, naprosyn or other any over-the-counter pain relievers which may interact with your blood thinner. 2. Anxiety  -We're so sorry about your father's passing  -You shared how this has affected you both emotionally and financially  -Continue escitalopram 10-mg daily  -Continue Xanax 0.5-mg three times daily as needed    3. High blood pressure  -I'm concerned about your high blood pressure  -Please call your cardiologist's office to let him know as he ,may want to adjust your blood pressure medication     4.  Money worries  -You have a court hearing in February to appeal your denied disability claim  -We may refer you for a functional capacity exam depending upon the results of your scans      This is what you have shared with us about Advance Care Planning:      Primary Decision Maker: Anette Caruso - Father - [phone]    Secondary Decision Maker: Sherrill Mosquera - Other Relative - [phone]  [] Named in a scanned document   [x] Legal Next of Kin  [] Guardian    ACP documents you current have include:  [] Advance Directive or Living Will  [] Durable Do Not Resuscitate  [] Physician Orders for Scope of Treatment (POST)  [] Medical Power of   [] Other      The Palliative Medicine Team is here to support you and your family. Sincerely,      Mariah Hardy MD and the Palliative Medicine Team                Counseling and Coordination:        GOALS OF CARE / TREATMENT PREFERENCES:   [====Goals of Care====]  GOALS OF CARE:  Patient / health care proxy stated goals: See Patient Instructions / Summary    TREATMENT PREFERENCES:   Code Status:  [x] Attempt Resuscitation       [] Do Not Attempt Resuscitation    Advance Care Planning:  [x] The [facility] Interdisciplinary Team has updated the ACP Navigator with Decision Maker and Patient Capacity      Primary Decision Maker: Anette Lynch Father - [phone]    Secondary Decision Maker: Sherrill Mosquera - Other Relative - [phone]  [] Named in a scanned document   [x] Legal Next of Kin  [] Guardian    Other:  (If patient appropriate for POST, consider using PALLPOST smart phrase here)    The palliative care team has discussed with patient / health care proxy about goals of care / treatment preferences for patient.  [====Goals of Care====]     PRESCRIPTIONS GIVEN:     Medications Ordered Today   Medications    ALPRAZolam (XANAX) 1 mg tablet     Sig: Take 1 Tab by mouth three (3) times daily as needed for Anxiety for up to 30 days. Max Daily Amount: 3 mg. Indications: anxious     Dispense:  90 Tab     Refill:  0    oxyCODONE IR (ROXICODONE) 5 mg immediate release tablet     Sig: Take 1 Tab by mouth every four (4) hours as needed for Pain for up to 30 days. Max Daily Amount: 30 mg. Dispense:  80 Tab     Refill:  0           FOLLOW UP:     Future Appointments   Date Time Provider Dahlia Epps   2019  3:45  [facility]   2020 10:30 AM Wilver Montalvo MD [address]   2020 10:00 AM [facility]   2020 10:00 AM SS [facility]   3/5/2020  9:15 AM Ihsan Blake NP [facility] SCHED   2020 10:00 [facility]           PHYSICIANS INVOLVED IN CARE:   Patient Care Team:  None as PCP - Kwadwo Weber MD (Hematology and Oncology)  Vega Mccray MD as Physician (Palliative Medicine)  Vega Mccray MD as Physician (Palliative Medicine)       HISTORY:   Reviewed patient-completed ESAS and advance care planning form. Reviewed patient record in prescription monitoring program.    CHIEF COMPLAINT:   Chief Complaint   Patient presents with    LOW BACK PAIN       HPI/SUBJECTIVE:    The patient is: [x] Verbal / [] Nonverbal     He's really frustrated. He still has a lot of back pain. He's been taking gabapentin now for several weeks and it's not helping. He can't stand for long periods of time. It hurts more when he tries to lift anything. He isn't able to do much now at all because of the pain. He tried PT and that didn't help. He had back pain before his cancer and thought it would get better after treatment but it hasn't. His father  a month ago. He's still living in his father's house but he has no income now. His father left the house to him in his will but he still has to pay the mortgage. He's very stressed. He has a court hearing in February about his disability denials. He's taking Xanax three times a day for his anxiety. He's also taking his anti-depressant but isn't sure if it does anything. He's smoking more due to his stress.     He hasn't had any chest pain but sometimes feels a pressure in his chest, especially since his father  last month. It's happened a few times, he hasn't noticed anything that seems to bring on the pressure and it goes away after a few minutes. His appetite is good. He's moving his bowels regularly. Clinical Pain Assessment (nonverbal scale for nonverbal patients):   [++++ Clinical Pain Assessment++++]  [++++Pain Severity++++]: Pain: 10  [++++Pain Character++++]: stabbing pain in back  [++++Pain Duration++++]: months for back pain, weeks for groin pain  [++++Pain Effect++++]: little  [++++Pain Factors++++]: oxycodone helps with back pain, groin pain elicited by standing and walking  [++++Pain Frequency++++]: constant back pain with varying intensity  [++++Pain Location++++]: left lower back  [++++ Clinical Pain Assessment++++]       FUNCTIONAL ASSESSMENT:     Palliative Performance Scale (PPS):  PPS: 80       PSYCHOSOCIAL/SPIRITUAL SCREENING:     Any spiritual / Mormon concerns:  [] Yes /  [x] No    Caregiver Burnout:  [] Yes /  [x] No /  [] No Caregiver Present      Anticipatory grief assessment:   [x] Normal  / [] Maladaptive       ESAS Anxiety: Anxiety: 7    ESAS Depression: Depression: 4       REVIEW OF SYSTEMS:     The following systems were [x] reviewed / [] unable to be reviewed  Systems: constitutional, ears/nose/mouth/throat, respiratory, gastrointestinal, genitourinary, musculoskeletal, integumentary, neurologic, psychiatric, endocrine. Positive findings noted below. Modified ESAS Completed by: provider   Fatigue: 3 Drowsiness: 3   Depression: 4 Pain: 10   Anxiety: 7 Nausea: 0   Anorexia: 0 Dyspnea: 0   Best Well-Bein Constipation: No   Other Problem (Comment): 0          PHYSICAL EXAM:     Wt Readings from Last 3 Encounters:   19 157 lb 6.4 oz (71.4 kg)   19 158 lb (71.7 kg)   19 158 lb 4.8 oz (71.8 kg)     Blood pressure (!) 157/98, pulse 68, temperature 97.3 °F (36.3 °C), temperature source Oral, resp.  rate 19, height 5' 7\" (1.702 m), weight 157 lb 6.4 oz (71.4 kg), SpO2 98 %. Last bowel movement: See Nursing Note    Constitutional: sitting in chair, appears frustrated  Eyes: pupils equal, anicteric  ENMT: no nasal discharge, moist mucous membranes  Cardiovascular: regular rhythm, no peripheral edema  Respiratory: breathing not labored, symmetric  Gastrointestinal: soft non-tender, +bowel sounds  Musculoskeletal: no deformity; point tenderness left mid-paraspinal musculature  Skin: warm, dry  Neurologic: following commands, moving all extremities, normal gait; bilateral quadriceps and hamstrings 5/5; brisk, symmetric patellar reflexes  Psychiatric: full affect, no hallucinations  Other:       HISTORY:     Past Medical History:   Diagnosis Date    Anxiety     Cancer [facility])     lymphoma Nov 2018 receiving chemo    Chronic pain     lower back- lymphoma    Hyperlipidemia     Hypertension     Lymphadenopathy 11/12/2018      Past Surgical History:   Procedure Laterality Date    HX HEART CATHETERIZATION  02/2019    HX OTHER SURGICAL  02/2019    cardiac stent    IR INJECTION PSEUDOANEURYSM  2/26/2019      Family History   Problem Relation Age of Onset    Hypertension Father     Diabetes Father     Diabetes Mother       History reviewed, no pertinent family history. Social History     Tobacco Use    Smoking status: Current Every Day Smoker     Packs/day: 0.50     Years: 20.00     Pack years: 10.00    Smokeless tobacco: Never Used   Substance Use Topics    Alcohol use: No     Frequency: Never     No Known Allergies   Current Outpatient Medications   Medication Sig    gabapentin (NEURONTIN) 300 mg capsule PLEASE SEE ATTACHED FOR DETAILED DIRECTIONS    [START ON 12/13/2019] ALPRAZolam (XANAX) 1 mg tablet Take 1 Tab by mouth three (3) times daily as needed for Anxiety for up to 30 days. Max Daily Amount: 3 mg. Indications: anxious    metoprolol succinate (TOPROL-XL) 50 mg XL tablet Take 1 Tab by mouth daily.     lisinopril (PRINIVIL, ZESTRIL) 20 mg tablet Take 1 Tab by mouth daily.  escitalopram oxalate (LEXAPRO) 10 mg tablet Take 1 Tab by mouth daily.  atorvastatin (LIPITOR) 40 mg tablet Take 1 Tab by mouth nightly. Indications: treatment to slow progression of coronary artery disease    clopidogrel (PLAVIX) 75 mg tab 1 Tab by Per NG tube route daily.  aspirin delayed-release (ASPIR-81) 81 mg tablet Take 1 Tab by mouth daily.  potassium chloride (KLOR-CON) 10 mEq tablet Take 4 Tabs by mouth daily.  oxyCODONE IR (ROXICODONE) 5 mg immediate release tablet Take 1 Tab by mouth every four (4) hours as needed for Pain for up to 30 days. Max Daily Amount: 30 mg.    CHANTIX 1 mg tablet TAKE 1 TABLET BY MOUTH TWICE A DAY     No current facility-administered medications for this visit. Facility-Administered Medications Ordered in Other Visits   Medication Dose Route Frequency    sodium chloride (NS) flush 5-10 mL  5-10 mL IntraVENous PRN    0.9% sodium chloride injection 10 mL  10 mL IntraVENous PRN    heparin (porcine) pf 500 Units  500 Units IntraVENous PRN          LAB DATA REVIEWED:     Lab Results   Component Value Date/Time    WBC 6.4 12/05/2019 10:45 AM    HGB 13.3 12/05/2019 10:45 AM    PLATELET 632 99/13/2962 10:45 AM     Lab Results   Component Value Date/Time    Sodium 142 12/05/2019 10:45 AM    Potassium 2.8 (L) 12/05/2019 10:45 AM    Chloride 108 12/05/2019 10:45 AM    CO2 28 12/05/2019 10:45 AM    BUN 5 (L) 12/05/2019 10:45 AM    Creatinine 1.05 12/05/2019 10:45 AM    Calcium 8.5 12/05/2019 10:45 AM    Magnesium 1.7 01/12/2019 04:05 AM    Phosphorus 2.0 (L) 01/12/2019 04:05 AM      Lab Results   Component Value Date/Time    AST (SGOT) 19 12/05/2019 10:45 AM    Alk.  phosphatase 159 (H) 12/05/2019 10:45 AM    Protein, total 6.1 (L) 12/05/2019 10:45 AM    Albumin 3.2 (L) 12/05/2019 10:45 AM    Globulin 2.9 12/05/2019 10:45 AM     Lab Results   Component Value Date/Time    INR 1.1 02/22/2019 08:18 PM    Prothrombin time 10.8 02/22/2019 08:18 PM aPTT 27.8 02/22/2019 08:18 PM      No results found for: IRON, FE, TIBC, IBCT, PSAT, FERR        CONTROLLED SUBSTANCES SAFETY ASSESSMENT (IF ON CONTROLLED SUBSTANCES):     Reviewed opioid safety handout:  [x] Yes   [] No  24 hour opioid dose >150mg morphine equivalent/day:  [] Yes   [x] No  Benzodiazepines:  [x] Yes   [] No  Sleep apnea:  [] Yes   [x] No  Urine Toxicology Testing within last 6 months:  [] Yes   [] No  History of or new aberrant medication taking behaviors:  [] Yes   [] No  Has Narcan been prescribed [] Yes   [x] No          Total time:   Counseling / coordination time:   > 50% counseling / coordination?:

## 2019-12-05 NOTE — PATIENT INSTRUCTIONS
1. If CT does not call you to schedule, please make sure you schedule a CT ([phone] to schedule) we will call you with the results    2. I placed a referral to orthopedic specialist to further evaluate your back pain.

## 2019-12-05 NOTE — PROGRESS NOTES
Outpatient Infusion Center Short Visit Progress Note    Patient admitted to [facility] for PF labs ambulatory in stable condition. Vital Signs:  Visit Vitals  /88   Pulse 71   Temp 97.9 °F (36.6 °C)   Resp 16   Wt 71.8 kg (158 lb 4.8 oz)   SpO2 97%   BMI 24.79 kg/m²         R chest port with positive blood return. Lab Results:  Labs pending in CC. Medications:  Medications Administered     heparin (porcine) pf 500 Units     Admin Date  12/05/2019 Action  Given Dose  500 Units Route  IntraVENous Administered By  [email or web address]          sodium chloride (NS) flush 5-10 mL     Admin Date  12/05/2019 Action  Given Dose  10 mL Route  IntraVENous Administered By  [email or web address]                Patient tolerated treatment well. Patient discharged from [place] [address] ambulatory in no distress. Patient aware of next appointment.     Feliberto Caraballo RN    Future Appointments   Date Time Provider Dahlia Grissomi   12/5/2019 12:30 PM Marcel Weinstein MD [address]   1/9/2020 10:00 AM [facility]   2/20/2020 10:00 AM [facility]   4/2/2020 10:00 AM [facility]

## 2019-12-05 NOTE — PROGRESS NOTES
Palliative Medicine Office Visit  Palliative Medicine Nurse Check In  [phone]    Patient Name: Javier Mendieta  YOB: 1975      Date of Office Visit: 12/5/2019     Patient states: \"  \"    From Check In Sheet (scanned in Media):  Has a medical provider talked with you about cardiopulmonary resuscitation (CPR)? [x] Yes   [] No   [] Unable to obtain    Nurse reminder to complete or update ACP FlowSheet:    Is ACP on the Problem List?    [] Yes    [x] No  IF ACP Document is ON FILE; Nurse to place ACP on Problem List     Is there an ACP Note in Chart Review/Note? [] Yes    [x] No   If NO: ALERT PROVIDER       Primary Decision Maker: David Josétimothy - Father - [phone]    Secondary Decision Maker: Sherrill Mosquera - Other Relative - [phone]  Advance Care Planning 12/5/2019   Patient's Healthcare Decision Maker is: Verbal statement (Legal Next of Kin remains as decision maker)   Confirm Advance Directive None   Patient Would Like to Complete Advance Directive Yes   Does the patient have other document types -         Is there anything that we should know about you as a person in order to provide you the best care possible? Have you been to the ER, urgent care clinic since your last visit? [] Yes   [x] No   [] Unable to obtain    Have you been hospitalized since your last visit? [] Yes   [x] No   [] Unable to obtain    Have you seen or consulted any other health care providers outside of the [address] [place] [address] since your last visit?    [] Yes   [x] No   [] Unable to obtain    Functional status (describe):         Last BM: 12/4/2019    accessed (date): 12/5/2019     Bottle review (for opioid pain medication):  Medication 1:   Date filled:   Directions:   # filled:   # left:   # pills taking per day:  Last dose taken:    Medication 2:   Date filled:   Directions:   # filled:   # left:   # pills taking per day:  Last dose taken:    Medication 3:   Date filled: Directions:   # filled:   # left:   # pills taking per day:  Last dose taken:    Medication 4:   Date filled:   Directions:   # filled:   # left:   # pills taking per day:  Last dose taken:

## 2019-12-05 NOTE — PATIENT INSTRUCTIONS
Dear Hosea Severance ,    It was a pleasure seeing you today in [facility]. We will see you again in 4 weeks    If labs or imaging tests have been ordered for you today, please call the office at [phone] 48 hours after completion to obtain the results. Your described symptoms were: Fatigue: 3 Drowsiness: 3   Depression: 4 Pain: 10   Anxiety: 7 Nausea: 0   Anorexia: 0 Dyspnea: 0   Best Well-Bein Constipation: No   Other Problem (Comment): 0       This is the plan we talked about:      1. Pain  -You continue to have pain in your left lower back  -Your most recent scans showed you've had a complete response to chemotherapy  -I'm ordered an MRI of your lumbar spine to see if you have any lumbar spine degenerative disease or nerve root compression  -Dr. Inga Woodall ordered repeat CT scans  -Gabapentin didn't help so I'm going to taper you off this medication   -Take 1 cap twice daily for 3 days, then   -Take 1 cap daily for 3 days, hten stop  -Start oxycodone 5-mg every 4 hours as needed.  -Today I prescribed 80 pills to last for the next month  -If the above scans are negative, I may taper you off opioids   -Continue tylenol as needed  -Avoid taking ibuprofen, naprosyn or other any over-the-counter pain relievers which may interact with your blood thinner. 2. Anxiety  -We're so sorry about your father's passing  -You shared how this has affected you both emotionally and financially  -Continue escitalopram 10-mg daily  -Continue Xanax 0.5-mg three times daily as needed    3. High blood pressure  -I'm concerned about your high blood pressure  -Please call your cardiologist's office to let him know as he ,may want to adjust your blood pressure medication     4.  Money worries  -You have a court hearing in February to appeal your denied disability claim  -We may refer you for a functional capacity exam depending upon the results of your scans      This is what you have shared with us about Advance Care Planning:      Primary Decision Maker: Anette Caruso - Father - [phone]    Secondary Decision Maker: Sherrill Mosquera - Other Relative - [phone]  [] Named in a scanned document   [x] Legal Next of Kin  [] Guardian    ACP documents you current have include:  [] Advance Directive or Living Will  [] Durable Do Not Resuscitate  [] Physician Orders for Scope of Treatment (POST)  [] Medical Power of   [] Other      The Palliative Medicine Team is here to support you and your family.          Sincerely,      Mariah Hardy MD and the Palliative Medicine Team

## 2019-12-05 NOTE — TELEPHONE ENCOUNTER
[address] at [place]  [phone]      12/05/19 12:43 PM Left message for patient via home/cell number requesting that he return call. Provided office phone number as well. Per Heavenly Romano, patient's potassium level is still low at 2.8. Heavenly Romano has called in a prescription for potassium 10mEQ tablets that are smaller, but patient should take a total of 40mEQ daily. Also need to verify if patient is still taking Lexapro daily. 1:42 PM Patient arrived to clinic. Heavenly Romano NP, informed him of above and discussed additional instructions.

## 2019-12-05 NOTE — PROGRESS NOTES
Keny Maya is a 45 y.o. male here today for follow up of follicular lymphoma. Stopped potassium tabs about 2 weeks ago due to unable to swallow pills. States they are too large.

## 2019-12-06 ENCOUNTER — APPOINTMENT (OUTPATIENT)
Dept: INFUSION THERAPY | Age: 44
End: 2019-12-06

## 2019-12-06 ENCOUNTER — TELEPHONE (OUTPATIENT)
Dept: PALLATIVE CARE | Age: 44
End: 2019-12-06

## 2019-12-06 NOTE — TELEPHONE ENCOUNTER
Call made to  Nestor Walters. I informed him the PA is still in review. I called the pharmacy advised to reprocess Rx later this evening if not a paid claim to reprocess in the morning.  Nestor Walters is aware of all.

## 2019-12-06 NOTE — TELEPHONE ENCOUNTER
Patient calling stating that the Oxycodone needs a prior authorization. States he has to pay out of pocket since no authorization on file. Advised nurse would call him to discuss.

## 2019-12-09 ENCOUNTER — DOCUMENTATION ONLY (OUTPATIENT)
Dept: PALLATIVE CARE | Age: 44
End: 2019-12-09

## 2019-12-16 ENCOUNTER — HOSPITAL ENCOUNTER (OUTPATIENT)
Dept: CT IMAGING | Age: 44
Discharge: HOME | End: 2019-12-16
Attending: NURSE PRACTITIONER
Payer: MEDICAID

## 2019-12-16 DIAGNOSIS — C82.33 FOLLICULAR LYMPHOMA GRADE IIIA OF INTRA-ABDOMINAL LYMPH NODES (HCC): ICD-10-CM

## 2019-12-16 PROCEDURE — 74011636320 HC RX REV CODE- 636/320: Performed by: RADIOLOGY

## 2019-12-16 PROCEDURE — 74177 CT ABD & PELVIS W/CONTRAST: CPT

## 2019-12-16 RX ADMIN — IOPAMIDOL 100 ML: 755 INJECTION, SOLUTION INTRAVENOUS at 10:54

## 2019-12-18 ENCOUNTER — HOSPITAL ENCOUNTER (OUTPATIENT)
Dept: MRI IMAGING | Age: 44
Discharge: HOME | End: 2019-12-18
Attending: INTERNAL MEDICINE
Payer: COMMERCIAL

## 2019-12-18 VITALS — WEIGHT: 160 LBS | HEIGHT: 67 IN | BODY MASS INDEX: 25.11 KG/M2

## 2019-12-18 DIAGNOSIS — C82.33 FOLLICULAR LYMPHOMA GRADE IIIA OF INTRA-ABDOMINAL LYMPH NODES (HCC): ICD-10-CM

## 2019-12-18 DIAGNOSIS — G89.29 CHRONIC LEFT-SIDED LOW BACK PAIN WITHOUT SCIATICA: ICD-10-CM

## 2019-12-18 DIAGNOSIS — M54.50 CHRONIC LEFT-SIDED LOW BACK PAIN WITHOUT SCIATICA: ICD-10-CM

## 2019-12-18 PROCEDURE — 74011250636 HC RX REV CODE- 250/636: Performed by: RADIOLOGY

## 2019-12-18 PROCEDURE — 77030003666 HC NDL SPINAL BD -A

## 2019-12-18 PROCEDURE — 72158 MRI LUMBAR SPINE W/O & W/DYE: CPT

## 2019-12-18 PROCEDURE — A9575 INJ GADOTERATE MEGLUMI 0.1ML: HCPCS | Performed by: RADIOLOGY

## 2019-12-18 RX ORDER — GADOTERATE MEGLUMINE 376.9 MG/ML
14 INJECTION INTRAVENOUS
Status: COMPLETED | OUTPATIENT
Start: 2019-12-18 | End: 2019-12-18

## 2019-12-18 RX ADMIN — GADOTERATE MEGLUMINE 14 ML: 376.9 INJECTION INTRAVENOUS at 16:31

## 2019-12-26 ENCOUNTER — TELEPHONE (OUTPATIENT)
Dept: PALLATIVE CARE | Age: 44
End: 2019-12-26

## 2019-12-26 NOTE — TELEPHONE ENCOUNTER
Returned call to [employer] with [address] in the insurance authorization dept. He shared that the MRI of the lumbar spine that was ordered by Palliative was not approved by Mr. Flynnysjuan carlos Riddle insurance, as they are requiring more clinical information. Phone number for Bernard Correa is [phone]. Reference number is [id number]. Appreciated his call and Palliative to look into authorization.     Kelly Moya RN  Palliative Medicine

## 2019-12-27 NOTE — TELEPHONE ENCOUNTER
Call made to [facility] 1- [phone]. Reference number is [id number]. I was informed they need additional information in order to approve the MRI. The patient will had to have had PT within the past 6 months at least 12 visits. However, will accept less visits. Will need documentation along with the d/c summary faxed back to [facility] within 5 business days [phone]. [facility] [address] [facility]. Requested fax documentation to me ASAP. Received documentation faxed to [facility] with confirmation.

## 2020-01-02 ENCOUNTER — OFFICE VISIT (OUTPATIENT)
Dept: PALLATIVE CARE | Age: 45
End: 2020-01-02

## 2020-01-02 ENCOUNTER — TELEPHONE (OUTPATIENT)
Dept: PALLATIVE CARE | Age: 45
End: 2020-01-02

## 2020-01-02 NOTE — PATIENT INSTRUCTIONS
Dear Bert Rashid. ,    It was a pleasure seeing you today in [facility]. We will see you again in 4 weeks    If labs or imaging tests have been ordered for you today, please call the office at [phone] 48 hours after completion to obtain the results. Your described symptoms were: This is the plan we talked about:      1. Pain  -You continue to have pain in your left lower back  -Your most recent scans showed you've had a complete response to chemotherapy  -I'm ordered an MRI of your lumbar spine to see if you have any lumbar spine degenerative disease or nerve root compression  -Dr. Tucker Donis ordered repeat CT scans  -Gabapentin didn't help so I'm going to taper you off this medication   -Take 1 cap twice daily for 3 days, then   -Take 1 cap daily for 3 days, hten stop  -Start oxycodone 5-mg every 4 hours as needed.  -Today I prescribed 80 pills to last for the next month  -If the above scans are negative, I may taper you off opioids   -Continue tylenol as needed  -Avoid taking ibuprofen, naprosyn or other any over-the-counter pain relievers which may interact with your blood thinner. 2. Anxiety  -We're so sorry about your father's passing  -You shared how this has affected you both emotionally and financially  -Continue escitalopram 10-mg daily  -Continue Xanax 0.5-mg three times daily as needed    3. High blood pressure  -I'm concerned about your high blood pressure  -Please call your cardiologist's office to let him know as he ,may want to adjust your blood pressure medication     4.  Money worries  -You have a court hearing in February to appeal your denied disability claim  -We may refer you for a functional capacity exam depending upon the results of your scans      This is what you have shared with us about Advance Care Planning:      Secondary Decision Maker: Sherrill Mosquera - Other Relative - [phone]  [] Named in a scanned document   [x] Legal Next of Kin  [] Guardian    ACP documents you current have include:  [] Advance Directive or Living Will  [] Durable Do Not Resuscitate  [] Physician Orders for Scope of Treatment (POST)  [] Medical Power of   [] Other      The Palliative Medicine Team is here to support you and your family.          Sincerely,      Meg Tiwari MD and the Palliative Medicine Team

## 2020-01-02 NOTE — TELEPHONE ENCOUNTER
Patient calling due to missing appt today at [facility]. Advised patient of openings on 1/7/20 and 1/9/20 at [facility]. Pt wants 12:30 pm at [facility] on 1/7/20.

## 2020-01-03 ENCOUNTER — TELEPHONE (OUTPATIENT)
Dept: PALLATIVE CARE | Age: 45
End: 2020-01-03

## 2020-01-03 DIAGNOSIS — C82.33 FOLLICULAR LYMPHOMA GRADE IIIA OF INTRA-ABDOMINAL LYMPH NODES (HCC): ICD-10-CM

## 2020-01-03 DIAGNOSIS — G89.29 CHRONIC LEFT-SIDED LOW BACK PAIN WITHOUT SCIATICA: ICD-10-CM

## 2020-01-03 DIAGNOSIS — M54.50 CHRONIC LEFT-SIDED LOW BACK PAIN WITHOUT SCIATICA: ICD-10-CM

## 2020-01-03 NOTE — TELEPHONE ENCOUNTER
Patient called. He needs a refill on his Oxycodone. Please call. RENEE missed his appointment yesterday and is rescheduled to 01-07-19.

## 2020-01-03 NOTE — TELEPHONE ENCOUNTER
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 -Appointment scheduled but missed or moved prior to need for scheduled refill    Pharmacy: _[facility]/PHARMACY #[id number] [id number]      Medication:oxyCODONE IR (ROXICODONE) 5 mg  Dose and directions: Take 1 Tab by mouth every four (4) hours as needed   Number dispensed:80  Date filled ( or Pharmacy):12/6/2019   #left:     reviewed: _yes     Date of Urine Drug Screen:  _n/a    Opioid Safety Handout given:  _yes     Appropriate for refill:  _yes     Action:  _ please refill

## 2020-01-04 RX ORDER — OXYCODONE HYDROCHLORIDE 5 MG/1
5 TABLET ORAL
Qty: 80 TAB | Refills: 0 | Status: SHIPPED | OUTPATIENT
Start: 2020-01-04 | End: 2020-01-07 | Stop reason: SDUPTHER

## 2020-01-06 ENCOUNTER — DOCUMENTATION ONLY (OUTPATIENT)
Dept: PALLATIVE CARE | Age: 45
End: 2020-01-06

## 2020-01-06 NOTE — TELEPHONE ENCOUNTER
Palliative Medicine  Nursing Note  [phone]  Fax [phone]      Telephone Call  Patient Name: Kobe Herrera. YOB: [birth year]        Secondary Decision Maker: DaxakhadijahSherrill fernandez - Other Relative - [phone]   Advance Care Planning 12/5/2019   Patient's Healthcare Decision Maker is: Verbal statement (Legal Next of Kin remains as decision maker)   Confirm Advance Directive None   Patient Would Like to Complete Advance Directive Yes   Does the patient have other document types -     Call placed to Mr. Sally Enriquez. He verified appt time with Dr. Dominique Zuniga tomorrow at 12:30 at [facility]. He did not have any other concerns at present. He stated he would bring his medications for a pill count.     Rayne Marroquin RN  Palliative Medicine

## 2020-01-07 ENCOUNTER — OFFICE VISIT (OUTPATIENT)
Dept: PALLATIVE CARE | Age: 45
End: 2020-01-07

## 2020-01-07 ENCOUNTER — HOSPITAL ENCOUNTER (OUTPATIENT)
Dept: LAB | Age: 45
Discharge: HOME | End: 2020-01-07

## 2020-01-07 VITALS
DIASTOLIC BLOOD PRESSURE: 77 MMHG | RESPIRATION RATE: 16 BRPM | BODY MASS INDEX: 24.55 KG/M2 | TEMPERATURE: 97.2 F | OXYGEN SATURATION: 96 % | SYSTOLIC BLOOD PRESSURE: 122 MMHG | WEIGHT: 156.4 LBS | HEART RATE: 64 BPM | HEIGHT: 67 IN

## 2020-01-07 DIAGNOSIS — E87.6 HYPOKALEMIA: ICD-10-CM

## 2020-01-07 DIAGNOSIS — F41.9 ANXIETY: ICD-10-CM

## 2020-01-07 DIAGNOSIS — G89.29 CHRONIC LEFT-SIDED LOW BACK PAIN WITHOUT SCIATICA: Primary | ICD-10-CM

## 2020-01-07 DIAGNOSIS — C82.33 FOLLICULAR LYMPHOMA GRADE IIIA OF INTRA-ABDOMINAL LYMPH NODES (HCC): ICD-10-CM

## 2020-01-07 DIAGNOSIS — M54.50 CHRONIC LEFT-SIDED LOW BACK PAIN WITHOUT SCIATICA: Primary | ICD-10-CM

## 2020-01-07 DIAGNOSIS — F32.9 REACTIVE DEPRESSION: ICD-10-CM

## 2020-01-07 LAB
ANION GAP SERPL CALC-SCNC: 5 MMOL/L (ref 5–15)
BUN SERPL-MCNC: 8 MG/DL (ref 6–20)
BUN/CREAT SERPL: 8 (ref 12–20)
CALCIUM SERPL-MCNC: 9.1 MG/DL (ref 8.5–10.1)
CHLORIDE SERPL-SCNC: 104 MMOL/L (ref 97–108)
CO2 SERPL-SCNC: 30 MMOL/L (ref 21–32)
CREAT SERPL-MCNC: 1.05 MG/DL (ref 0.7–1.3)
GLUCOSE SERPL-MCNC: 78 MG/DL (ref 65–100)
POTASSIUM SERPL-SCNC: 4.5 MMOL/L (ref 3.5–5.1)
SODIUM SERPL-SCNC: 139 MMOL/L (ref 136–145)

## 2020-01-07 RX ORDER — ESCITALOPRAM OXALATE 20 MG/1
20 TABLET ORAL DAILY
Qty: 30 TAB | Refills: 5 | Status: SHIPPED | OUTPATIENT
Start: 2020-01-07 | End: 2020-08-05

## 2020-01-07 RX ORDER — OXYCODONE HYDROCHLORIDE 5 MG/1
5 TABLET ORAL
Qty: 80 TAB | Refills: 0 | Status: SHIPPED | OUTPATIENT
Start: 2020-02-06 | End: 2020-02-04 | Stop reason: SDUPTHER

## 2020-01-07 RX ORDER — ALPRAZOLAM 1 MG/1
1 TABLET ORAL
Qty: 90 TAB | Refills: 0 | Status: SHIPPED | OUTPATIENT
Start: 2020-01-11 | End: 2020-02-04 | Stop reason: SDUPTHER

## 2020-01-07 NOTE — PROGRESS NOTES
Palliative Medicine Office Visit  Palliative Medicine Nurse Check In  [phone]    Patient Name: Kingsley Castellanos. YOB: 1975      Date of Office Visit: 1/7/2020      Patient states: C/O increased anxiety. From Check In Sheet (scanned in Media):  Has a medical provider talked with you about cardiopulmonary resuscitation (CPR)? [x] Yes   [] No   [] Unable to obtain    Nurse reminder to complete or update ACP FlowSheet:    Is ACP on the Problem List?    [] Yes    [x] No  IF ACP Document is ON FILE; Nurse to place ACP on Problem List     Is there an ACP Note in Chart Review/Note? [] Yes    [x] No   If NO: [address] Planning 12/5/2019   Patient's Healthcare Decision Maker is: Verbal statement (Legal Next of Kin remains as decision maker)   Confirm Advance Directive None   Patient Would Like to Complete Advance Directive Yes   Does the patient have other document types -       Secondary Decision Maker: Sherrill Mosquera - Other Relative - [phone]  Advance Care Planning 1/7/2020   Patient's [place] is: Verbal statement (Legal Next of Kin remains as decision maker)   Confirm Advance Directive None   Patient Would Like to Complete Advance Directive No   Does the patient have other document types -         Is there anything that we should know about you as a person in order to provide you the best care possible? Have you been to the ER, urgent care clinic since your last visit? [] Yes   [x] No   [] Unable to obtain    Have you been hospitalized since your last visit? [] Yes   [x] No   [] Unable to obtain    Have you seen or consulted any other health care providers outside of the [address] [place] [address] since your last visit?    [] Yes   [x] No   [] Unable to obtain    Functional status (describe):     Last BM: last night     accessed (date): 1/6/20    Bottle review (for opioid pain medication):  Medication 1:  oxycodone Ir 5 mg tab   Date filled: 1/4/20  Directions: 1 tab by mouth every 4 hrs as needed  # filled:  80  # left: 74  # pills taking per day:1-2  Last dose taken:10AM    Medication 2:   Date filled:   Directions:   # filled:   # left:   # pills taking per day:  Last dose taken:    Medication 3:   Date filled:   Directions:   # filled:   # left:   # pills taking per day:  Last dose taken:    Medication 4:   Date filled:   Directions:   # filled:   # left:   # pills taking per day:  Last dose taken:

## 2020-01-07 NOTE — PROGRESS NOTES
Palliative Medicine Outpatient Services  [place]: [phone] ([phone])    Patient Name: Felicia Galvan YOB: 1975    Date of Current Visit: 01/07/20  Location of Current Visit:    [] [address] [facility] Office  [x] [facility] Office  [] [address] [place] [address]  [] Home  [] Other:      Date of Initial Visit: 2/19/19   Referral from: Juan Manuel Levin MD  Primary Care Physician: None      SUMMARY:   Felicia Galvan is a 45y.o. year old with high-grade follicular lymphoma, who was referred to Palliative Medicine by Dr. Marsha Hussein for symptom management and supportive care. He was diagnosed in 11/2018 after presenting with back pain. He is currently receiving systemic chemotherapy. He suffered cardiac arrest during cycle #3 due to previously undiagnosed severe stenosis of the LAD s/p PTCA and stent. He has since completed systemic chemotherapy. His most recent PET-CT (5/2019) showed no focal areas of increased hypermetabolic uptake. The patients social history includes: he is single. He lives in [place]. He has 3 teenage children who live with their mother and with whom he has close contact. 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 He's applied for disability. Palliative Medicine is addressing the following current patient/family concerns: anxiety, depression, left mid- and low back pain, poor appetite, fatigue, advanced care planning. Initial Referral Intake note from Barbara Clements RN reviewed prior to visit   PALLIATIVE DIAGNOSES:       ICD-10-CM ICD-9-CM    1. Chronic left-sided low back pain without sciatica M54.5 724.2 oxyCODONE IR (ROXICODONE) 5 mg immediate release tablet    G89.29 338.29    2. Anxiety F41.9 300.00 ALPRAZolam (XANAX) 1 mg tablet   3. Reactive depression F32.9 300.4    4.  Follicular lymphoma grade IIIa of intra-abdominal lymph nodes (HCC) C82.33 202.03 oxyCODONE IR (ROXICODONE) 5 mg immediate release tablet          PLAN:   Patient Instructions     Dear Felicia Mccormick. ,    It was a pleasure seeing you today in [facility]. We will see you again in 4 weeks    If labs or imaging tests have been ordered for you today, please call the office at [phone] 48 hours after completion to obtain the results. Your described symptoms were: Fatigue: 0 Drowsiness: 0   Depression: 0 Pain: 4   Anxiety: 5 Nausea: 0   Anorexia: 0 Dyspnea: 0   Best Well-Bein Constipation: No   Other Problem (Comment): 0       This is the plan we talked about:      1. Back pain  -Your pain is much better with oxycodone which you use once or twice a day  -Continue oxycodone 5-mg every 4 hours as needed  -I refilled these today. Oxycodone 5-mg every 4 hours as needed #80 which you can  on 2020  -Continue tylenol as needed  -Avoid taking ibuprofen, naprosyn or other any over-the-counter pain relievers which may interact with your blood thinner.  -We also reviewed your recent CT and MRi scans today:  -CT chest/abdomen 19:  Findings are consistent with interval response to therapy. -MRI lumbar spine 19:  Mild disc degenerative change at L3-4 and L4-5. Mild canal stenosis at L3-4 and mild left foraminal stenosis at L4-5. Other less severe degenerative findings are as described above. Continued diminished size of retroperitoneal mass-adenopathy,  with diminished soft tissue density at the left renal hilum. 2. Anxiety  -Increase escitalopram to 20-mg daily.  You can take 2 of the 10-mg pills until you run out  -I called in a prescription for the 20-mg pills today  -Continue Xanax 0.5-mg three times daily as needed  -I refilled this today for pick  Up on 2020  -We talked today about the potential risk of adverse side effects between Xanax and oxycodone so it's important to take these medications as prescribed   -I will continue to prescribe #90 Xanax a month for your anxiety  -Today about , Aliya Serrano, sat in during your visit and she met with you briefly afterwards  -You made an appointment to see Julia Gleason later this month     3. High blood pressure  -Your blood pressure looks great today!  -Rosa Milian checked your potassium level today but the result wasn't back before you left clinic today     4. Money worries  -You have a court hearing in February to appeal your denied disability claim  -We may refer you for a functional capacity exam depending upon the results of your scans      This is what you have shared with us about Advance Care Planning:      Secondary Decision Maker: Sherrill Mosquera - Other Relative - [phone]  [] Named in a scanned document   [x] Legal Next of Kin  [] Guardian    ACP documents you current have include:  [] Advance Directive or Living Will  [] Durable Do Not Resuscitate  [] Physician Orders for Scope of Treatment (POST)  [] Medical Power of   [] Other      The Palliative Medicine Team is here to support you and your family.          Sincerely,      Meg Tiwari MD and the Palliative Medicine Team                Counseling and Coordination:        GOALS OF CARE / TREATMENT PREFERENCES:   [====Goals of Care====]  GOALS OF CARE:  Patient / health care proxy stated goals: See Patient Instructions / Summary    TREATMENT PREFERENCES:   Code Status:  [x] Attempt Resuscitation       [] Do Not Attempt Resuscitation    Advance Care Planning:  [x] [facility] Interdisciplinary Team has updated the ACP Navigator with Decision Maker and Patient Capacity      Secondary Decision Maker: Sherrill Mosquera - Other Relative - [phone]  [] Named in a scanned document   [x] Legal Next of Kin  [] Guardian    Other:  (If patient appropriate for POST, consider using PALLPOST smart phrase here)    The palliative care team has discussed with patient / health care proxy about goals of care / treatment preferences for patient.  [====Goals of Care====]     PRESCRIPTIONS GIVEN:     Medications Ordered Today   Medications    oxyCODONE IR (ROXICODONE) 5 mg immediate release tablet     Sig: Take 1 Tab by mouth every four (4) hours as needed for Pain for up to 30 days. Max Daily Amount: 30 mg. Dispense:  80 Tab     Refill:  0    ALPRAZolam (XANAX) 1 mg tablet     Sig: Take 1 Tab by mouth three (3) times daily as needed for Anxiety for up to 30 days. Max Daily Amount: 3 mg. Indications: anxious     Dispense:  90 Tab     Refill:  0    escitalopram oxalate (LEXAPRO) 20 mg tablet     Sig: Take 1 Tab by mouth daily. Dispense:  30 Tab     Refill:  5           FOLLOW UP:     Future Appointments   Date Time Provider Dahlia Aguileraisti   2020 10:00 AM [facility]   2020 11:00 AM Nabila Adler LCSW [facility]   2020 10:00 AM SS INF7 CH3 [facility]1H [facility]   3/5/2020  9:15 AM Nacho Blake NP [facility]   3/10/2020 11:30 AM Adrienne Almeida MD [facility]   2020 10:00 [facility]           PHYSICIANS INVOLVED IN CARE:   Patient Care Team:  None as PCP - Mercy Coates MD (Hematology and Oncology)  Zina Bar MD as Physician (Palliative Medicine)  Zina Bar MD as Physician (Palliative Medicine)       HISTORY:   Reviewed patient-completed ESAS and advance care planning form. Reviewed patient record in prescription monitoring program.    CHIEF COMPLAINT:   Chief Complaint   Patient presents with    Back Pain       HPI/SUBJECTIVE:    The patient is: [x] Verbal / [] Nonverbal     His back pain is much better since he restarted oxycodone. He's been able to work on a few projects around the house. His pain was a 10 most of the time, now it's mostly a 4.    He's been taking his Xanax 3 times a day, then started to take 2 at bedtime because he's having trouble sleeping. His father  a month ago. He's still living in his father's house but he has no income now. His father left the house to him in his will but he still has to pay the mortgage.  He's very stressed. He has a court hearing in February about his disability denials. He's taking Xanax three times a day for his anxiety. He's also taking his anti-depressant but isn't sure if it does anything. He's smoking more due to his stress. He hasn't had any chest pain but sometimes feels a pressure in his chest, especially since his father  last month. It's happened a few times, he hasn't noticed anything that seems to bring on the pressure and it goes away after a few minutes. His appetite is good. He's moving his bowels regularly. Clinical Pain Assessment (nonverbal scale for nonverbal patients):   [++++ Clinical Pain Assessment++++]  [++++Pain Severity++++]: Pain: 4  [++++Pain Character++++]: stabbing pain in back  [++++Pain Duration++++]: months for back pain, weeks for groin pain  [++++Pain Effect++++]: little  [++++Pain Factors++++]: oxycodone helps with back pain, groin pain elicited by standing and walking  [++++Pain Frequency++++]: constant back pain with varying intensity  [++++Pain Location++++]: left lower back  [++++ Clinical Pain Assessment++++]       FUNCTIONAL ASSESSMENT:     Palliative Performance Scale (PPS):  PPS: 80       PSYCHOSOCIAL/SPIRITUAL SCREENING:     Any spiritual / Buddhism concerns:  [] Yes /  [x] No    Caregiver Burnout:  [] Yes /  [x] No /  [] No Caregiver Present      Anticipatory grief assessment:   [x] Normal  / [] Maladaptive       ESAS Anxiety: Anxiety: 5    ESAS Depression: Depression: 0       REVIEW OF SYSTEMS:     The following systems were [x] reviewed / [] unable to be reviewed  Systems: constitutional, ears/nose/mouth/throat, respiratory, gastrointestinal, genitourinary, musculoskeletal, integumentary, neurologic, psychiatric, endocrine. Positive findings noted below.   Modified ESAS Completed by: provider   Fatigue: 0 Drowsiness: 0   Depression: 0 Pain: 4   Anxiety: 5 Nausea: 0   Anorexia: 0 Dyspnea: 0   Best Well-Bein Constipation: No Other Problem (Comment): 0          PHYSICAL EXAM:     Wt Readings from Last 3 Encounters:   01/07/20 156 lb 6.4 oz (70.9 kg)   12/18/19 160 lb (72.6 kg)   12/05/19 158 lb 4.8 oz (71.8 kg)     Blood pressure 122/77, pulse 64, temperature 97.2 °F (36.2 °C), temperature source Oral, resp. rate 16, height 5' 7\" (1.702 m), weight 156 lb 6.4 oz (70.9 kg), SpO2 96 %. Last bowel movement: See Nursing Note    Constitutional: sitting in chair, appears frustrated  Eyes: pupils equal, anicteric  ENMT: no nasal discharge, moist mucous membranes  Cardiovascular: regular rhythm, no peripheral edema  Respiratory: breathing not labored, symmetric  Gastrointestinal: soft non-tender, +bowel sounds  Musculoskeletal: no deformity; point tenderness left mid-paraspinal musculature  Skin: warm, dry  Neurologic: following commands, moving all extremities, normal gait; bilateral quadriceps and hamstrings 5/5; brisk, symmetric patellar reflexes  Psychiatric: full affect, no hallucinations  Other:       HISTORY:     Past Medical History:   Diagnosis Date    Anxiety     Cancer [facility])     lymphoma Nov 2018 receiving chemo    Chronic pain     lower back- lymphoma    Hyperlipidemia     Hypertension     Lymphadenopathy 11/12/2018      Past Surgical History:   Procedure Laterality Date    HX HEART CATHETERIZATION  02/2019    HX OTHER SURGICAL  02/2019    cardiac stent    IR INJECTION PSEUDOANEURYSM  2/26/2019      Family History   Problem Relation Age of Onset    Hypertension Father     Diabetes Father     Diabetes Mother       History reviewed, no pertinent family history.   Social History     Tobacco Use    Smoking status: Current Every Day Smoker     Packs/day: 0.50     Years: 20.00     Pack years: 10.00    Smokeless tobacco: Never Used   Substance Use Topics    Alcohol use: No     Frequency: Never     No Known Allergies   Current Outpatient Medications   Medication Sig    [START ON 2/6/2020] oxyCODONE IR (ROXICODONE) 5 mg immediate release tablet Take 1 Tab by mouth every four (4) hours as needed for Pain for up to 30 days. Max Daily Amount: 30 mg.    [START ON 1/11/2020] ALPRAZolam (XANAX) 1 mg tablet Take 1 Tab by mouth three (3) times daily as needed for Anxiety for up to 30 days. Max Daily Amount: 3 mg. Indications: anxious    escitalopram oxalate (LEXAPRO) 20 mg tablet Take 1 Tab by mouth daily.  metoprolol succinate (TOPROL-XL) 50 mg XL tablet Take 1 Tab by mouth daily.  lisinopril (PRINIVIL, ZESTRIL) 20 mg tablet Take 1 Tab by mouth daily.  atorvastatin (LIPITOR) 40 mg tablet Take 1 Tab by mouth nightly. Indications: treatment to slow progression of coronary artery disease    clopidogrel (PLAVIX) 75 mg tab 1 Tab by Per NG tube route daily.  aspirin delayed-release (ASPIR-81) 81 mg tablet Take 1 Tab by mouth daily.  potassium chloride (KLOR-CON) 10 mEq tablet Take 4 Tabs by mouth daily.  CHANTIX 1 mg tablet TAKE 1 TABLET BY MOUTH TWICE A DAY     No current facility-administered medications for this visit. LAB DATA REVIEWED:     Lab Results   Component Value Date/Time    WBC 6.4 12/05/2019 10:45 AM    HGB 13.3 12/05/2019 10:45 AM    PLATELET 345 98/91/5177 10:45 AM     Lab Results   Component Value Date/Time    Sodium 142 12/05/2019 10:45 AM    Potassium 2.8 (L) 12/05/2019 10:45 AM    Chloride 108 12/05/2019 10:45 AM    CO2 28 12/05/2019 10:45 AM    BUN 5 (L) 12/05/2019 10:45 AM    Creatinine 1.05 12/05/2019 10:45 AM    Calcium 8.5 12/05/2019 10:45 AM    Magnesium 1.7 01/12/2019 04:05 AM    Phosphorus 2.0 (L) 01/12/2019 04:05 AM      Lab Results   Component Value Date/Time    AST (SGOT) 19 12/05/2019 10:45 AM    Alk.  phosphatase 159 (H) 12/05/2019 10:45 AM    Protein, total 6.1 (L) 12/05/2019 10:45 AM    Albumin 3.2 (L) 12/05/2019 10:45 AM    Globulin 2.9 12/05/2019 10:45 AM     Lab Results   Component Value Date/Time    INR 1.1 02/22/2019 08:18 PM    Prothrombin time 10.8 02/22/2019 08:18 PM aPTT 27.8 02/22/2019 08:18 PM      No results found for: IRON, FE, TIBC, IBCT, PSAT, FERR       MRI lumbar spine 12/18/19:  Congenital narrowing of the lumbar canal. Vertebral body heights are maintained. Marrow signal is normal.     The conus medullaris terminates at T12/L1. Signal and caliber of the distal  spinal cord are within normal limits. There is no pathologic intrathecal  enhancement.     The paraspinal soft tissues are within normal limits.     Lower thoracic spine: No herniation or stenosis.     L1-L2: No herniation or stenosis.     L2-L3: No herniation or stenosis.     L3-L4: Mild facet arthropathy. Minimal central disc protrusion. Mild canal  stenosis. Foramina are patent     L4-L5: Desiccation. Mild facet arthropathy. Canal demonstrates minimal stenosis. There is an annular ligament tear far laterally on the left. Mild left foraminal  stenosis.     L5-S1: Mild facet arthropathy. Canal and foramina are patent. IMPRESSION:  Mild disc degenerative change at L3-4 and L4-5.     Mild canal stenosis at L3-4 and mild left foraminal stenosis at L4-5.     Other less severe degenerative findings are as described above. CT chest/abdomen 12/16/19:  FINDINGS:      THYROID: No nodule. MEDIASTINUM: No mass or lymphadenopathy. Port catheter in place on the right. Catheter tip in appropriate position. SB: No mass or lymphadenopathy. THORACIC AORTA: No dissection or aneurysm. MAIN PULMONARY ARTERY: Unremarkable  TRACHEA/BRONCHI: Unremarkable  ESOPHAGUS: No wall thickening or dilatation. HEART: Normal in size. PLEURA: No effusion or pneumothorax. LUNGS: Bibasilar atelectasis is noted. Jarrell Spies LIVER: No mass or biliary dilatation. GALLBLADDER: Layering contrast versus cholelithiasis. SPLEEN: No mass. PANCREAS: No mass or ductal dilatation. ADRENALS: The left adrenal gland is elevated by the retroperitoneal mass. The    right is unremarkable.     KIDNEYS: There is diminished soft tissue density at the level of the left renal  hilum. There is increased left renal cortical atrophy. STOMACH: Unremarkable. SMALL BOWEL: No dilatation or wall thickening. COLON: No dilatation or wall thickening. APPENDIX: Unremarkable. PERITONEUM: No ascites or pneumoperitoneum. RETROPERITONEUM:   Diminished size of retroperitoneal mass. Diminished in size with margins difficult to fully ascertain. 2-62 35 x 50 mm previously 71 x 94 mm.     2-67 left periaortic adenopathy 25 x 17 mm  The SMA, celiac, and LIZZY are remain encased, diminished attenuation of these  vessels.      REPRODUCTIVE ORGANS: The prostate and seminal vesicles are unremarkable. URINARY  BLADDER: Unremarkable  BONES: No destructive bone lesion. ADDITIONAL COMMENTS: Resolved left inguinal pseudoaneurysm. Howarden Nicholas RECIST   Baseline examination     TARGET LESIONS:         Lesion (description)         Location (series/slice)                Size                                              1. 2-62 retroperitoneal soft tissue    35 x 50 mm     2. 2-67 left periaortic adenopathy 25 x 17 mm     3.     4.     IMPRESSION:    Baseline research examination. Findings are consistent with interval response to therapy.      Continued diminished size of retroperitoneal mass-adenopathy,  with diminished soft tissue density at the left renal hilum.      Clinical Pain Assessment (nonverbal scale for nonver     CONTROLLED SUBSTANCES SAFETY ASSESSMENT (IF ON CONTROLLED SUBSTANCES):     Reviewed opioid safety handout:  [x] Yes   [] No  24 hour opioid dose >150mg morphine equivalent/day:  [] Yes   [x] No  Benzodiazepines:  [x] Yes   [] No  Sleep apnea:  [] Yes   [x] No  Urine Toxicology Testing within last 6 months:  [] Yes   [x] No  History of or new aberrant medication taking behaviors:  [] Yes   [x] No  Has Narcan been prescribed [x] Yes   [] No          Total time:   Counseling / coordination time:   > 50% counseling / coordination?:

## 2020-01-07 NOTE — PATIENT INSTRUCTIONS
Dear Pop Raza. ,    It was a pleasure seeing you today in [facility]. We will see you again in 4 weeks    If labs or imaging tests have been ordered for you today, please call the office at [phone] 48 hours after completion to obtain the results. Your described symptoms were: Fatigue: 0 Drowsiness: 0   Depression: 0 Pain: 4   Anxiety: 5 Nausea: 0   Anorexia: 0 Dyspnea: 0   Best Well-Bein Constipation: No   Other Problem (Comment): 0       This is the plan we talked about:      1. Back pain  -Your pain is much better with oxycodone which you use once or twice a day  -Continue oxycodone 5-mg every 4 hours as needed  -I refilled these today. Oxycodone 5-mg every 4 hours as needed #80 which you can  on 2020  -Continue tylenol as needed  -Avoid taking ibuprofen, naprosyn or other any over-the-counter pain relievers which may interact with your blood thinner.  -We also reviewed your recent CT and MRi scans today:  -CT chest/abdomen 19:  Findings are consistent with interval response to therapy. -MRI lumbar spine 19:  Mild disc degenerative change at L3-4 and L4-5. Mild canal stenosis at L3-4 and mild left foraminal stenosis at L4-5. Other less severe degenerative findings are as described above. Continued diminished size of retroperitoneal mass-adenopathy,  with diminished soft tissue density at the left renal hilum. 2. Anxiety  -Increase escitalopram to 20-mg daily.  You can take 2 of the 10-mg pills until you run out  -I called in a prescription for the 20-mg pills today  -Continue Xanax 0.5-mg three times daily as needed  -I refilled this today for pick  Up on 2020  -We talked today about the potential risk of adverse side effects between Xanax and oxycodone so it's important to take these medications as prescribed   -I will continue to prescribe #90 Xanax a month for your anxiety  -Today about , Danielle Sanderson, sat in during your visit and she met with you briefly afterwards  -You made an appointment to see Danielle Sanderson later this month     3. High blood pressure  -Your blood pressure looks great today!  -[facility] [place] checked your potassium level today but the result wasn't back before you left clinic today     4. Money worries  -You have a court hearing in February to appeal your denied disability claim  -We may refer you for a functional capacity exam depending upon the results of your scans      This is what you have shared with us about Advance Care Planning:      Secondary Decision Maker: Sherrill Mosquera - Other Relative - [phone]  [] Named in a scanned document   [x] Legal Next of Kin  [] Guardian    ACP documents you current have include:  [] Advance Directive or Living Will  [] Durable Do Not Resuscitate  [] Physician Orders for Scope of Treatment (POST)  [] Medical Power of   [] Other      The Palliative Medicine Team is here to support you and your family.          Sincerely,      Zina Bar MD and the Palliative Medicine Team

## 2020-01-07 NOTE — PROGRESS NOTES
[address] Work  [phone]    Narrative  Met with patient during and after his appointment with Dr. Shelly Franklin. This writer has previously introduced herself to patient and wanted to follow up with patient as patient continues to struggle with anxiety and panic attacks. Writer offered supportive counseling and worked on building therapeutic rapport. Patient was open to this despite \"being really shy and not talking a lot\". Assessment   Patient was alert and oriented x4. Patient's mood was euthymic and affect constricted. Patient's insight and judgment were fair. Patient's thought process and speech were logical and clear. Patient was willing to explore with writer his anxiety and reported he may be willing to meet for individual therapy to address the anxiety and panic attacks. Patient recognized that he has a lot of grief due to loosing his father however holds his emotions in. Plan  Patient will schedule an appointment in the next couple of weeks.       Guerrero Tom, [place]   Palliative Medicine,   [phone]

## 2020-01-08 RX ORDER — HEPARIN 100 UNIT/ML
500 SYRINGE INTRAVENOUS AS NEEDED
Status: CANCELLED | OUTPATIENT
Start: 2020-01-09

## 2020-01-08 RX ORDER — SODIUM CHLORIDE 9 MG/ML
10 INJECTION INTRAMUSCULAR; INTRAVENOUS; SUBCUTANEOUS AS NEEDED
Status: CANCELLED | OUTPATIENT
Start: 2020-01-09

## 2020-01-08 RX ORDER — NALOXONE HYDROCHLORIDE 4 MG/.1ML
SPRAY NASAL
Qty: 1 EACH | Refills: 0 | Status: SHIPPED | OUTPATIENT
Start: 2020-01-08 | End: 2022-04-15

## 2020-01-08 RX ORDER — SODIUM CHLORIDE 0.9 % (FLUSH) 0.9 %
5-10 SYRINGE (ML) INJECTION AS NEEDED
Status: CANCELLED | OUTPATIENT
Start: 2020-01-09

## 2020-01-08 RX ORDER — METOPROLOL SUCCINATE 50 MG/1
TABLET, EXTENDED RELEASE ORAL
Qty: 90 TAB | Refills: 1 | Status: SHIPPED | OUTPATIENT
Start: 2020-01-08 | End: 2020-04-07

## 2020-01-08 NOTE — TELEPHONE ENCOUNTER
Per VO of Dr. Isaias Mason: 10/18/2019    Future Appointments   Date Time Provider Dahlia Epps   1/9/2020 10:00 AM SS INF7 CH3 <1H [facility]   1/21/2020 11:00 AM Kaylee Lewis LCSW [facility]   2/20/2020 10:00 AM SS INF7 CH3 <1H [facility]   3/5/2020  9:15 AM Chely Blake NP [facility]   3/10/2020 11:30 AM Taya Mccormick MD [facility]   4/2/2020 10:00 AM SS INF7 CH3 <1H [facility]       Requested Prescriptions     Signed Prescriptions Disp Refills    metoprolol succinate (TOPROL-XL) 50 mg XL tablet 90 Tab 1     Sig: TAKE 1 TABLET BY MOUTH EVERY DAY     Authorizing Provider: Araceli Brown     Ordering User: Carmen Jensen

## 2020-01-08 NOTE — PROGRESS NOTES
Attempted to call patient via home/cell number listed. No answer, left message requesting he return call regarding his lab results. Provided office phone number as well.

## 2020-01-10 NOTE — PROGRESS NOTES
Attempted to call patient via home/cell number listed. Phone rang twice, then call was disconnected. Will attempt to call again later.

## 2020-01-13 NOTE — PROGRESS NOTES
Attempted to call patient via home/cell number. No answer, left message requesting that patient return call regarding his lab results. Patient not signed up for [employer]. Will send letter as this nurse has attempted to contact patient x 3 with no success.

## 2020-01-14 ENCOUNTER — TELEPHONE (OUTPATIENT)
Dept: CARDIOLOGY CLINIC | Age: 45
End: 2020-01-14

## 2020-01-14 ENCOUNTER — TELEPHONE (OUTPATIENT)
Dept: ONCOLOGY | Age: 45
End: 2020-01-14

## 2020-01-14 NOTE — TELEPHONE ENCOUNTER
Patient left a vm message ----asking for a return call from [facility] ---concerned about a bruise and knot almost like a hematoma

## 2020-01-14 NOTE — TELEPHONE ENCOUNTER
[address] at [facility]  [phone]        01/14/20 11:07 AM Returned call to patient. Patient with complaints of marble-sized \"knot\" on right bicep. States area around it is bruised. Area is tender to touch. Patient says he first noticed this this morning. Denies any trauma/injury to area. Advised this nurse would forward above to Dr. Garo Mercedes and Elias Chambers for further recommendations and call patient back. He verbalized understanding. 2:04 PM Called patient back. Advised, per Elias Chambers, that patient continue to monitor the knot. Suggested that patient apply ice to the area. Asked that patient contact the office if area becomes more painful, erythematous, or if knot grows in size. Patient verbalized understanding of above and denies any further questions or concerns at this time.

## 2020-01-14 NOTE — TELEPHONE ENCOUNTER
Patient is having an epidural steroid injection with Dr. Edyta Powers at [address] [place] [address]. They would like him to stop his Plavix 7 days prior to appointment. Patient had cardiac arrest/Nstemi event 2/2019. Please advise.

## 2020-01-20 ENCOUNTER — TELEPHONE (OUTPATIENT)
Dept: PALLATIVE CARE | Age: 45
End: 2020-01-20

## 2020-01-20 NOTE — TELEPHONE ENCOUNTER
Calling patient to advise that Appt with Arpit Leyaljonathon would need to be camce;;ed die tp A;ex being out of office. No answer so left voicemail advising of this and that Rhelli Rosales would call him to reschedule.

## 2020-01-30 ENCOUNTER — DOCUMENTATION ONLY (OUTPATIENT)
Dept: PALLATIVE CARE | Age: 45
End: 2020-01-30

## 2020-02-04 ENCOUNTER — OFFICE VISIT (OUTPATIENT)
Dept: PALLATIVE CARE | Age: 45
End: 2020-02-04

## 2020-02-04 VITALS
BODY MASS INDEX: 25.33 KG/M2 | SYSTOLIC BLOOD PRESSURE: 120 MMHG | WEIGHT: 161.4 LBS | OXYGEN SATURATION: 98 % | TEMPERATURE: 97.3 F | HEIGHT: 67 IN | DIASTOLIC BLOOD PRESSURE: 79 MMHG | RESPIRATION RATE: 16 BRPM | HEART RATE: 83 BPM

## 2020-02-04 DIAGNOSIS — F41.9 ANXIETY: ICD-10-CM

## 2020-02-04 DIAGNOSIS — F41.9 ANXIETY: Primary | ICD-10-CM

## 2020-02-04 DIAGNOSIS — G89.29 CHRONIC LEFT-SIDED LOW BACK PAIN WITHOUT SCIATICA: ICD-10-CM

## 2020-02-04 DIAGNOSIS — M54.50 CHRONIC LEFT-SIDED LOW BACK PAIN WITHOUT SCIATICA: ICD-10-CM

## 2020-02-04 DIAGNOSIS — C82.33 FOLLICULAR LYMPHOMA GRADE IIIA OF INTRA-ABDOMINAL LYMPH NODES (HCC): ICD-10-CM

## 2020-02-04 RX ORDER — OXYCODONE HYDROCHLORIDE 5 MG/1
5 TABLET ORAL
Qty: 80 TAB | Refills: 0 | Status: SHIPPED | OUTPATIENT
Start: 2020-02-06 | End: 2020-03-04 | Stop reason: SDUPTHER

## 2020-02-04 RX ORDER — ALPRAZOLAM 1 MG/1
TABLET ORAL
Qty: 75 TAB | Refills: 0 | Status: SHIPPED | OUTPATIENT
Start: 2020-02-10 | End: 2020-03-04 | Stop reason: SDUPTHER

## 2020-02-04 NOTE — PROGRESS NOTES
Palliative Medicine Outpatient Services  [place]: [phone])    Patient Name: Trevon Colindres. YOB: 1975    Date of Current Visit: 02/04/20  Location of Current Visit:    [] [facility] Office  [x] [facility] Office  [] [address] [place] [address]  [] Home  [] Other:      Date of Initial Visit: 2/19/19   Referral from: Ananda Clifton MD  Primary Care Physician: None      SUMMARY:   Trevon Pena is a 45y.o. year old with high-grade follicular lymphoma, who was referred to Palliative Medicine by Dr. Lynn Matt for symptom management and supportive care. He was diagnosed in 11/2018 after presenting with back pain. He is currently receiving systemic chemotherapy. He suffered cardiac arrest during cycle #3 due to previously undiagnosed severe stenosis of the LAD s/p PTCA and stent. He has since completed systemic chemotherapy. His most recent PET-CT (5/2019) showed no focal areas of increased hypermetabolic uptake. The patients social history includes: he is single. He lives in [place]. He has 3 teenage children who live with their mother and with whom he has close contact. 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 He's applied for disability. Palliative Medicine is addressing the following current patient/family concerns: anxiety, depression, left mid- and low back pain, poor appetite, fatigue, advanced care planning. Initial Referral Intake note from Felipe Keys RN reviewed prior to visit   PALLIATIVE DIAGNOSES:       ICD-10-CM ICD-9-CM    1. Chronic left-sided low back pain without sciatica M54.5 724.2 oxyCODONE IR (ROXICODONE) 5 mg immediate release tablet    G89.29 338.29    2. Anxiety F41.9 300.00 ALPRAZolam (XANAX) 1 mg tablet   3.  Follicular lymphoma grade IIIa of intra-abdominal lymph nodes (HCC) C82.33 202.03 oxyCODONE IR (ROXICODONE) 5 mg immediate release tablet          PLAN:   Patient Instructions     Dear Trevon Colindres. ,    It was a pleasure seeing you today in [address] [place] [address] Medicine Clinic. We will see you again in 4 weeks    If labs or imaging tests have been ordered for you today, please call the office at [phone] 48 hours after completion to obtain the results. Your described symptoms were: Fatigue: 5 Drowsiness: 2   Depression: 0 Pain: 4   Anxiety: 5 Nausea: 0   Anorexia: 0 Dyspnea: 0   Best Well-Bein Constipation: No   Other Problem (Comment): 0       This is the plan we talked about:      1. Back pain  -Your pain is much better with oxycodone which you use once or twice a day  -Continue oxycodone 5-mg every 4 hours as needed  -Oxycodone 5-mg every 4 hours as needed #80 which you can  on 2020  -Continue tylenol as needed  -Avoid taking ibuprofen, naprosyn or other any over-the-counter pain relievers which may interact with your blood thinner. 2. Anxiety  -Continue escitalopram to 20-mg daily.   -We talked today about the risks associated with taking medications called benzodiazepines (Xanax) with opioid medications.   -I recognized how challenging it has been for you to manage your anxiety which has caused significant distress for you.  -You've been living with a lot of stress and losses related to your cancer and with the recent death of your father,  -However, due to these safety concerns, Im going to very slowly taper your Xanax over the next 6 months.   -We will work with you to incorporate other techniques to help you manage your anxiety over the course of this taper.   -Today you met with our clinical , Lulu Barba, and you will be seeing her once a week for the next 4 weeks. -You currently take 1-mg Xanax three times a day.   -This is the plan for the taper which will be occur by decreasing your daily Xanax dose by ½ mg a month over the next 6 months:    Morning Mid-day Evening  Month #1 1-mg  ½-mg  1-mg  Month#2 ½-mg  ½-mg  1-mg  Month#3 ½-mg  ½-mg  ½-mg  Month#4 ½-mg    ½-mg  Month#5   ½-mg  Month#6 taper complete  -Today I refilled your Xanax with a 30-day supply (#75) with the new dose as month #1  -This is to be filled on 2/10/14739      This is what you have shared with us about Advance Care Planning:      Primary Decision Maker: [de-identified] - Ex-Spouse - [phone]    Secondary Decision Maker: Sherrill Mosquera - Other Relative - [phone]  [] Named in a scanned document   [x] Legal Next of Kin  [] Guardian    ACP documents you current have include:  [] Advance Directive or Living Will  [] Durable Do Not Resuscitate  [] Physician Orders for Scope of Treatment (POST)  [] Medical Power of   [] Other      The Palliative Medicine Team is here to support you and your family.          Sincerely,      Kaila Hayward MD and the Palliative Medicine Team                Counseling and Coordination:        GOALS OF CARE / TREATMENT PREFERENCES:   [====Goals of Care====]  GOALS OF CARE:  Patient / health care proxy stated goals: See Patient Instructions / Summary    TREATMENT PREFERENCES:   Code Status:  [x] Attempt Resuscitation       [] Do Not Attempt Resuscitation    Advance Care Planning:  [x] The Pall [facility] Interdisciplinary Team has updated the ACP Navigator with Decision Maker and Patient Capacity      Primary Decision Maker: [de-identified] - Ex-Spouse - [phone]    Secondary Decision Maker: Sherrill Mosquera - Other Relative - [phone]  [] Named in a scanned document   [x] Legal Next of Kin  [] Guardian    Other:  (If patient appropriate for POST, consider using PALLPOST smart phrase here)    The palliative care team has discussed with patient / health care proxy about goals of care / treatment preferences for patient.  [====Goals of Care====]     PRESCRIPTIONS GIVEN:     Medications Ordered Today   Medications    ALPRAZolam (XANAX) 1 mg tablet     Sig: Take 1 tab in the morning, 1/2 tab mid-day, 1 tab in the evening  Indications: anxious     Dispense:  75 Tab     Refill:  0    oxyCODONE IR (ROXICODONE) 5 mg immediate release tablet     Sig: Take 1 Tab by mouth every four (4) hours as needed for Pain for up to 30 days. Max Daily Amount: 30 mg. Dispense:  80 Tab     Refill:  0           FOLLOW UP:     Future Appointments   Date Time Provider Dahlia Epps   2/11/2020 11:00 AM Tonny Esparza LCSW [facility] SCHED   2/18/2020 11:00 AM Tonny Esparza LCSW [facility] SCHED   2/20/2020 10:00 AM [facility]   3/3/2020  9:30 AM Mingo Rodriguez MD [address]   3/5/2020  2:30 PM Florentin Blake,  [address], [address]   3/10/2020 11:30 AM Mingo Rodriguez MD [address]   4/2/2020 10:00 AM [facility]7 [facility]           PHYSICIANS INVOLVED IN CARE:   Patient Care Team:  None as PCP - Vladimir Jeronimo MD (Hematology and Oncology)  Meg Tiwari MD as Physician (Palliative Medicine)  Meg Tiwari MD as Physician (Palliative Medicine)       HISTORY:   Reviewed patient-completed ESAS and advance care planning form. Reviewed patient record in prescription monitoring program.    CHIEF COMPLAINT:   Chief Complaint   Patient presents with    Back Pain       HPI/SUBJECTIVE:    The patient is: [x] Verbal / [] Nonverbal     His back pain is ,manageable now. He has good days and bad days  He's using oxycodone as needed which really helps    The pill he increased after our last visit (Lexapro) really helped. He's still using Xanax 3 times a day, though. He has trouble sleeping. He's stressed about money. He goes to court to appeal his disability denial in a few weeks. His appetite is good. He's moving his bowels regularly.     Clinical Pain Assessment (nonverbal scale for nonverbal patients):   [++++ Clinical Pain Assessment++++]  [++++Pain Severity++++]: Pain: 4  [++++Pain Character++++]: stabbing pain in back  [++++Pain Duration++++]: months for back pain, weeks for groin pain  [++++Pain Effect++++]: little  [++++Pain Factors++++]: oxycodone helps with back pain, groin pain elicited by standing and walking  [++++Pain Frequency++++]: constant back pain with varying intensity  [++++Pain Location++++]: left lower back  [++++ Clinical Pain Assessment++++]       FUNCTIONAL ASSESSMENT:     Palliative Performance Scale (PPS):  PPS: 80       PSYCHOSOCIAL/SPIRITUAL SCREENING:     Any spiritual / Mormon concerns:  [] Yes /  [x] No    Caregiver Burnout:  [] Yes /  [x] No /  [] No Caregiver Present      Anticipatory grief assessment:   [x] Normal  / [] Maladaptive       ESAS Anxiety: Anxiety: 5    ESAS Depression: Depression: 0       REVIEW OF SYSTEMS:     The following systems were [x] reviewed / [] unable to be reviewed  Systems: constitutional, ears/nose/mouth/throat, respiratory, gastrointestinal, genitourinary, musculoskeletal, integumentary, neurologic, psychiatric, endocrine. Positive findings noted below. Modified ESAS Completed by: provider   Fatigue: 5 Drowsiness: 2   Depression: 0 Pain: 4   Anxiety: 5 Nausea: 0   Anorexia: 0 Dyspnea: 0   Best Well-Bein Constipation: No   Other Problem (Comment): 0          PHYSICAL EXAM:     Wt Readings from Last 3 Encounters:   20 161 lb 6.4 oz (73.2 kg)   20 156 lb 6.4 oz (70.9 kg)   19 160 lb (72.6 kg)     Blood pressure 120/79, pulse 83, temperature 97.3 °F (36.3 °C), temperature source Oral, resp. rate 16, height 5' 7\" (1.702 m), weight 161 lb 6.4 oz (73.2 kg), SpO2 98 %.   Last bowel movement: See Nursing Note    Constitutional: sitting in chair, appears frustrated  Eyes: pupils equal, anicteric  ENMT: no nasal discharge, moist mucous membranes  Cardiovascular: regular rhythm, no peripheral edema  Respiratory: breathing not labored, symmetric  Gastrointestinal: soft non-tender, +bowel sounds  Musculoskeletal: no deformity; point tenderness left mid-paraspinal musculature  Skin: warm, dry  Neurologic: following commands, moving all extremities, normal gait; bilateral quadriceps and hamstrings 5/5; brisk, symmetric patellar reflexes  Psychiatric: full affect, no hallucinations  Other:       HISTORY:     Past Medical History:   Diagnosis Date    Anxiety     Cancer [facility])     lymphoma Nov 2018 receiving chemo    Chronic pain     lower back- lymphoma    Hyperlipidemia     Hypertension     Lymphadenopathy 11/12/2018      Past Surgical History:   Procedure Laterality Date    HX HEART CATHETERIZATION  02/2019    HX OTHER SURGICAL  02/2019    cardiac stent    IR INJECTION PSEUDOANEURYSM  2/26/2019      Family History   Problem Relation Age of Onset    Hypertension Father     Diabetes Father     Diabetes Mother       History reviewed, no pertinent family history. Social History     Tobacco Use    Smoking status: Current Every Day Smoker     Packs/day: 0.50     Years: 20.00     Pack years: 10.00    Smokeless tobacco: Never Used   Substance Use Topics    Alcohol use: No     Frequency: Never     No Known Allergies   Current Outpatient Medications   Medication Sig    [START ON 2/10/2020] ALPRAZolam (XANAX) 1 mg tablet Take 1 tab in the morning, 1/2 tab mid-day, 1 tab in the evening  Indications: anxious    [START ON 2/6/2020] oxyCODONE IR (ROXICODONE) 5 mg immediate release tablet Take 1 Tab by mouth every four (4) hours as needed for Pain for up to 30 days. Max Daily Amount: 30 mg.    metoprolol succinate (TOPROL-XL) 50 mg XL tablet TAKE 1 TABLET BY MOUTH EVERY DAY    escitalopram oxalate (LEXAPRO) 20 mg tablet Take 1 Tab by mouth daily.  lisinopril (PRINIVIL, ZESTRIL) 20 mg tablet Take 1 Tab by mouth daily.  atorvastatin (LIPITOR) 40 mg tablet Take 1 Tab by mouth nightly. Indications: treatment to slow progression of coronary artery disease    clopidogrel (PLAVIX) 75 mg tab 1 Tab by Per NG tube route daily.  aspirin delayed-release (ASPIR-81) 81 mg tablet Take 1 Tab by mouth daily.     naloxone (NARCAN) 4 mg/actuation nasal spray Use 1 spray intranasally, then discard. Repeat with new spray every 2 min as needed for opioid overdose symptoms, alternating nostrils.  potassium chloride (KLOR-CON) 10 mEq tablet Take 4 Tabs by mouth daily.  CHANTIX 1 mg tablet TAKE 1 TABLET BY MOUTH TWICE A DAY     No current facility-administered medications for this visit. LAB DATA REVIEWED:     Lab Results   Component Value Date/Time    WBC 6.4 12/05/2019 10:45 AM    HGB 13.3 12/05/2019 10:45 AM    PLATELET 987 03/71/5319 10:45 AM     Lab Results   Component Value Date/Time    Sodium 139 01/07/2020 12:02 PM    Potassium 4.5 01/07/2020 12:02 PM    Chloride 104 01/07/2020 12:02 PM    CO2 30 01/07/2020 12:02 PM    BUN 8 01/07/2020 12:02 PM    Creatinine 1.05 01/07/2020 12:02 PM    Calcium 9.1 01/07/2020 12:02 PM    Magnesium 1.7 01/12/2019 04:05 AM    Phosphorus 2.0 (L) 01/12/2019 04:05 AM      Lab Results   Component Value Date/Time    AST (SGOT) 19 12/05/2019 10:45 AM    Alk. phosphatase 159 (H) 12/05/2019 10:45 AM    Protein, total 6.1 (L) 12/05/2019 10:45 AM    Albumin 3.2 (L) 12/05/2019 10:45 AM    Globulin 2.9 12/05/2019 10:45 AM     Lab Results   Component Value Date/Time    INR 1.1 02/22/2019 08:18 PM    Prothrombin time 10.8 02/22/2019 08:18 PM    aPTT 27.8 02/22/2019 08:18 PM      No results found for: IRON, FE, TIBC, IBCT, PSAT, FERR       MRI lumbar spine 12/18/19:  Congenital narrowing of the lumbar canal. Vertebral body heights are maintained. Marrow signal is normal.     The conus medullaris terminates at T12/L1. Signal and caliber of the distal  spinal cord are within normal limits. There is no pathologic intrathecal  enhancement.     The paraspinal soft tissues are within normal limits.     Lower thoracic spine: No herniation or stenosis.     L1-L2: No herniation or stenosis.     L2-L3: No herniation or stenosis.     L3-L4: Mild facet arthropathy. Minimal central disc protrusion. Mild canal  stenosis. Foramina are patent     L4-L5: Desiccation.  Mild facet arthropathy. Canal demonstrates minimal stenosis. There is an annular ligament tear far laterally on the left. Mild left foraminal  stenosis.     L5-S1: Mild facet arthropathy. Canal and foramina are patent. IMPRESSION:  Mild disc degenerative change at L3-4 and L4-5.     Mild canal stenosis at L3-4 and mild left foraminal stenosis at L4-5.     Other less severe degenerative findings are as described above. CT chest/abdomen 12/16/19:  FINDINGS:      THYROID: No nodule. MEDIASTINUM: No mass or lymphadenopathy. Port catheter in place on the right. Catheter tip in appropriate position. SB: No mass or lymphadenopathy. THORACIC AORTA: No dissection or aneurysm. MAIN PULMONARY ARTERY: Unremarkable  TRACHEA/BRONCHI: Unremarkable  ESOPHAGUS: No wall thickening or dilatation. HEART: Normal in size. PLEURA: No effusion or pneumothorax. LUNGS: Bibasilar atelectasis is noted. Marilynne Ruts LIVER: No mass or biliary dilatation. GALLBLADDER: Layering contrast versus cholelithiasis. SPLEEN: No mass. PANCREAS: No mass or ductal dilatation. ADRENALS: The left adrenal gland is elevated by the retroperitoneal mass. The    right is unremarkable. KIDNEYS: There is diminished soft tissue density at the level of the left renal  hilum. There is increased left renal cortical atrophy. STOMACH: Unremarkable. SMALL BOWEL: No dilatation or wall thickening. COLON: No dilatation or wall thickening. APPENDIX: Unremarkable. PERITONEUM: No ascites or pneumoperitoneum. RETROPERITONEUM:   Diminished size of retroperitoneal mass. Diminished in size with margins difficult to fully ascertain. 2-62 35 x 50 mm previously 71 x 94 mm.     2-67 left periaortic adenopathy 25 x 17 mm  The SMA, celiac, and LIZYZ are remain encased, diminished attenuation of these  vessels.      REPRODUCTIVE ORGANS: The prostate and seminal vesicles are unremarkable. URINARY  BLADDER: Unremarkable  BONES: No destructive bone lesion. ADDITIONAL COMMENTS: Resolved left inguinal pseudoaneurysm. Shanda Emery RECIST   Baseline examination     TARGET LESIONS:         Lesion (description)         Location (series/slice)                Size                                              1. 2-62 retroperitoneal soft tissue    35 x 50 mm     2. 2-67 left periaortic adenopathy 25 x 17 mm     3.     4.     IMPRESSION:    Baseline research examination. Findings are consistent with interval response to therapy.      Continued diminished size of retroperitoneal mass-adenopathy,  with diminished soft tissue density at the left renal hilum.      Clinical Pain Assessment (nonverbal scale for nonver     CONTROLLED SUBSTANCES SAFETY ASSESSMENT (IF ON CONTROLLED SUBSTANCES):     Reviewed opioid safety handout:  [x] Yes   [] No  24 hour opioid dose >150mg morphine equivalent/day:  [] Yes   [x] No  Benzodiazepines:  [x] Yes   [] No  Sleep apnea:  [] Yes   [x] No  Urine Toxicology Testing within last 6 months:  [] Yes   [x] No  History of or new aberrant medication taking behaviors:  [] Yes   [x] No  Has Narcan been prescribed [x] Yes   [] No          Total time: 40 minutes  Counseling / coordination time: 40 minutes  > 50% counseling / coordination?: yes - discussing medication safety, Xanax taper, dis

## 2020-02-04 NOTE — PATIENT INSTRUCTIONS
Dear Emma Paz. ,    It was a pleasure seeing you today in [facility]. We will see you again in 4 weeks    If labs or imaging tests have been ordered for you today, please call the office at [phone] 48 hours after completion to obtain the results. Your described symptoms were: Fatigue: 5 Drowsiness: 2   Depression: 0 Pain: 4   Anxiety: 5 Nausea: 0   Anorexia: 0 Dyspnea: 0   Best Well-Bein Constipation: No   Other Problem (Comment): 0       This is the plan we talked about:      1. Back pain  -Your pain is much better with oxycodone which you use once or twice a day  -Continue oxycodone 5-mg every 4 hours as needed  -Oxycodone 5-mg every 4 hours as needed #80 which you can  on 2020  -Continue tylenol as needed  -Avoid taking ibuprofen, naprosyn or other any over-the-counter pain relievers which may interact with your blood thinner. 2. Anxiety  -Continue escitalopram to 20-mg daily.   -We talked today about the risks associated with taking medications called benzodiazepines (Xanax) with opioid medications.   -I recognized how challenging it has been for you to manage your anxiety which has caused significant distress for you.  -You've been living with a lot of stress and losses related to your cancer and with the recent death of your father,  -However, due to these safety concerns, Im going to very slowly taper your Xanax over the next 6 months.   -We will work with you to incorporate other techniques to help you manage your anxiety over the course of this taper.   -Today you met with our clinical , Trevon Marsh, and you will be seeing her once a week for the next 4 weeks. -You currently take 1-mg Xanax three times a day.   -This is the plan for the taper which will be occur by decreasing your daily Xanax dose by ½ mg a month over the next 6 months:    Morning Mid-day Evening  Month #1 1-mg  ½-mg  1-mg  Month#2 ½-mg  ½-mg  1-mg  Month#3 ½-mg  ½-mg  ½-mg  Month#4 ½-mg    ½-mg  Month#5   ½-mg  Month#6 taper complete  -Today I refilled your Xanax with a 30-day supply (#75) with the new dose as month #1  -This is to be filled on 2/10/53532      This is what you have shared with us about Advance Care Planning:      Primary Decision Maker: [de-identified] - Ex-Spouse - [phone]    Secondary Decision Maker: Sherrill Mosquera - Other Relative - [phone]  [] Named in a scanned document   [x] Legal Next of Kin  [] Guardian    ACP documents you current have include:  [] Advance Directive or Living Will  [] Durable Do Not Resuscitate  [] Physician Orders for Scope of Treatment (POST)  [] Medical Power of   [] Other      The Palliative Medicine Team is here to support you and your family.          Sincerely,      Krissy John MD and the Palliative Medicine Team

## 2020-02-04 NOTE — PROGRESS NOTES
Palliative Medicine Office Visit  Palliative Medicine Nurse Check In  [phone]    Patient Name: Pop Raza. YOB: 1975      Date of Office Visit: 2/4/2020      Patient states: Follow up no issues    From Check In Sheet (scanned in Media):  Has a medical provider talked with you about cardiopulmonary resuscitation (CPR)? [x] Yes   [] No   [] Unable to obtain    Nurse reminder to complete or update ACP FlowSheet:    Is ACP on the Problem List?    [] Yes    [x] No  IF ACP Document is ON FILE; Nurse to place ACP on Problem List     Is there an ACP Note in Chart Review/Note? [] Yes    [x] No   If NO: [address] Planning 1/7/2020   Patient's Healthcare Decision Maker is: Verbal statement (Legal Next of Kin remains as decision maker)   Confirm Advance Directive None   Patient Would Like to Complete Advance Directive No   Does the patient have other document types -       Is there anything that we should know about you as a person in order to provide you the best care possible? Have you been to the ER, urgent care clinic since your last visit? [] Yes   [x] No   [] Unable to obtain    Have you been hospitalized since your last visit? [] Yes   [x] No   [] Unable to obtain    Have you seen or consulted any other health care providers outside of the [address] [place] [address] since your last visit? [] Yes   [x] No   [] Unable to obtain    Functional status (describe):     Last BM:last night     accessed (date):      Bottle review (for opioid pain medication):  Medication 1:  oxycodone ir 5 mg tab  Date filled:  1/4/20  Directions:  1 tab by mouth every 4 hrs as needed  # filled: 80  # left: 5  # pills taking per day:1  Last dose taken: 2/4/2020  Verified/Returned    Medication 2:   Date filled:   Directions:   # filled:   # left:   # pills taking per day:  Last dose taken:    Medication 3:   Date filled:   Directions:   # filled:   # left:   # pills taking per day:  Last dose taken:    Medication 4:   Date filled:   Directions:   # filled:   # left:   # pills taking per day:  Last dose taken:

## 2020-02-04 NOTE — PROGRESS NOTES
Corrie De Paz. Work  [phone]    Narrative  Patient attended his initial individual therapy appointment with this writer as scheduled. Patient talked about struggling with anxiety for a long time; since about 14 yo when his mother passed away unexpectedly. Patient talked some about his family of origin and about his childhood and some experiences that affected him throughout his life. Worked on building a therapeutic relationship and clarifying goals. Patient reported that he feels anxious about the possibility of being off Xanax; \"I feel like I need it\". Talked about the risks of being on benzos long term and benefits of learning other coping skills. Patient seemed open to this. Patient talked about his anxiety which often manifests in worry and over-thinking. Patient talked some about his divorce and about his 2 kids which are very important to him. Explored new skills to learn including mindfulness and grounding. Writer introduced patient to bilateral stimulation music and encouraged patient to try at home before sleep. Introduced patient to TIPP skill and cold water in hand and also encouraged patient to try at home next time he feels overwhelmed. Assessment     Patient was alert and oriented x4. Patient's mood was anxious and affect constricted. Patient's insight and judgment were good. Patient's thought process and speech were logical and clear. Patient denied any depressive sxs. Patient reports he is motivated to work on managing the anxiety. Plan  Will meet with patient weekly for the next 3-4 weeks and then reevaluate and space out appointments as necessary.     Time Spent: 60 minutes      Stephenie Mendez, [facility]   Palliative Medicine,   [phone]

## 2020-02-11 ENCOUNTER — TELEPHONE (OUTPATIENT)
Dept: PALLATIVE CARE | Age: 45
End: 2020-02-11

## 2020-02-11 NOTE — TELEPHONE ENCOUNTER
[address] [facility]  Palliative Medicine  Social Work  Telephone Call      Note:    Called patient back regarding rescheduling his individual appt and letting patient know that the nurse is still working on his paperwork which she will fax once she is done.      Length of Call: 3 minutes      Laura Martinez [facility]   Palliative Medicine,   [phone]

## 2020-02-11 NOTE — TELEPHONE ENCOUNTER
Mr. Cabreraaida Bartholomew is calling to speak to nurse regarding some paperwork . Advised nurse would call him back.

## 2020-02-11 NOTE — TELEPHONE ENCOUNTER
Palliative Medicine  Nursing Note  [phone]  Fax [phone]      Telephone Call  Patient Name: Jason Carnes. YOB: [birth year]        Primary Decision Maker: [de-identified] - Ex-Spouse - [phone]    Secondary Decision Maker: Sherrill Mosquera - Other Relative - [phone]   Advance Care Planning 1/7/2020   Patient's Healthcare Decision Maker is: Verbal statement (Legal Next of Kin remains as decision maker)   Confirm Advance Directive None   Patient Would Like to Complete Advance Directive No   Does the patient have other document types -     Call placed to Mr. Dessa Meigs and left voice message that Palliative has the paper work that he needs and will send to the law offices when complete. When completed, Paper work to be faxed to [facility] at [phone].     Citlalli Ceja RN  Palliative Medicine

## 2020-02-19 ENCOUNTER — TELEPHONE (OUTPATIENT)
Dept: PALLATIVE CARE | Age: 45
End: 2020-02-19

## 2020-02-19 RX ORDER — HEPARIN 100 UNIT/ML
500 SYRINGE INTRAVENOUS AS NEEDED
Status: CANCELLED | OUTPATIENT
Start: 2020-03-05

## 2020-02-19 RX ORDER — SODIUM CHLORIDE 0.9 % (FLUSH) 0.9 %
5-10 SYRINGE (ML) INJECTION AS NEEDED
Status: CANCELLED | OUTPATIENT
Start: 2020-03-05

## 2020-02-19 RX ORDER — SODIUM CHLORIDE 9 MG/ML
10 INJECTION INTRAMUSCULAR; INTRAVENOUS; SUBCUTANEOUS AS NEEDED
Status: CANCELLED | OUTPATIENT
Start: 2020-03-05

## 2020-02-19 NOTE — TELEPHONE ENCOUNTER
[address] [facility]  Palliative Medicine  Social Work  Telephone Call      Note:    Called patient as patient missed his last appointment for supportive counseling. Left a vm. Awaiting a call back.     Length of Call: 3 minutes      [facility]   Palliative Medicine,   [phone]

## 2020-02-25 ENCOUNTER — TELEPHONE (OUTPATIENT)
Dept: PALLATIVE CARE | Age: 45
End: 2020-02-25

## 2020-02-25 ENCOUNTER — TELEPHONE (OUTPATIENT)
Dept: ONCOLOGY | Age: 45
End: 2020-02-25

## 2020-02-25 NOTE — TELEPHONE ENCOUNTER
Patient states he needs a letter for his work  Letter needs to say that he is capable and can return to work with no limitations    Patient will pick this up when  Ready

## 2020-02-25 NOTE — TELEPHONE ENCOUNTER
[address] at [facility]  [phone]        02/25/20 2:57 PM Letter prepared specifying patient may return to work with no restrictions. Will place at  once signed. Patient aware and plans to pick letter up tomorrow. No further questions or concerns at this time.

## 2020-02-25 NOTE — TELEPHONE ENCOUNTER
Patient calling to reschedule his appt for 3/10 to 3/4/20. States he has job now and works on Tuesdays, Thursdays and Fridays.

## 2020-02-28 NOTE — PROGRESS NOTES
[address] [facility] at [facility]  [phone]    Hematology / Oncology Established Visit    Reason for Visit:   Kirsten Zazueta is a 45 y.o. male who comes in for f/u of lymphoma. Hematology Oncology Treatment History:     Diagnosis: Follicular lymphoma    Stage: IV    Pathology:   11/13/18 right inguinal LN excision: Follicular lymphoma, high-grade (grade 3a of 3). Comment   The delaney architecture is entirely effaced by atypical lymphocytic proliferation with prominent nodular growth pattern. The majority of the atypical lymphocytes are small to medium in size and have irregular nuclear outlines and inconspicuous nucleoli, morphologically consistent with centrocytes. Scattered large lymphocytes with prominent nucleoli, consistent with centroblasts are identified (> 15 per high-power field). Focal increase in mitotic figures is noted. Occasional follicular dendritic cells are seen. By immunohistochemistry, the atypical lymphocytes are positive for CD20, PAX5, CD10, BCL6 and BCL2 (weak, focal) and negative for MUM1. CD3, CD5 and CD43 stain numerous background T lymphocytes. Ki-67 reveals a high proliferation index in the neoplastic follicles (overall 07-19%). Clinical history indicates 14 cm retroperitoneal mass with bilateral inguinal lymphadenopathy. In summary, the combined morphologic and phenotypic findings are diagnostic of a high-grade follicular lymphoma (grade 3a of 3). There is no evidence of diffuse large B-cell lymphoma. Flow cytometry analysis:   Monoclonal B-cell population (47 % of all cells) with mild increase in side and forward scatter properties expressing CD19, CD20, CD23 and CD10 with surface lambda light chain restriction. No phenotypically aberrant T-cell population. Flow cytometry was performed at [employer]     Prior Treatment:   1. Obinutuzumab-CHOP.  Obinutuzumab: 1000 mg weekly on days 1, 8, 15 for cycle 1, then 1000 mg on day 1 q21 days for cycles 2-6, then monotherapy 1000 mg every 21 days for cycle 7, 8 with Cyclophosphamide 750mg/m2, Doxorubicin 50mg/m2, Vincristine 1.4mg/m2 on day 1 and Prednisone 100mg on Days 1-5, every 21 days for a total of 2 cycles completed late 1/2019. Regimen discontinued due to NSTEMI. 2. Obinutuzumab + Bendamustine: 1000 mg Obinutuzumab on day 1 + Bendamustine 90mg/m2 on days 1-2 on a 28-day cycle x 4 cycles, completed 5/2019    Current Treatment: Surveillance      Oncologic History:  He states he was in his usual state of health in early November 2018 when he developed low back pain and presented to the ER. The pain was described as constant, radiating to the buttocks, without exacerbating or alleviating factors, associated w/ increased urination, no n/v/d, no dysuria, no fever, no significant weight loss at first, but he did later report 15-lb loss over 1 month due to low appetite. Work-up at outside hospital revealed a retroperitoneal mass seen on CT imaging, and he was transferred to [facility] for further work-up. CT there showed a large retroperitoneal mass encircling the aorta with invasion of the left renal hilum and left adrenal gland. There were bilateral inguinal lymph nodes and moderate left hydronephrosis. He was evaluated while at [facility] and was noted to have palpable nodes in his groin for approximately the past 1 month. No testicular mass, anorexia, weight loss, fevers, night sweats, chest pain, shortness of breath, abdominal pain or nausea. He underwent excisional LN biopsy of right inguinal LN. He was told that he had likely lymphoma. He was advised to follow up as outpatient for PET scan, bone marrow biopsy. However, patient states he was lost to f/u. He never received any calls or appointment details. His aunt urged patient to seek care elsewhere and his PCP recommended [facility].  At our first meeting on 12/13/18, he tells me that he was working full time, feeling well until early Nov 2018. When he developed the left lower back pain, he went to [facility] and [facility]. At time of diagnosis, he had no cardiac disease aside from HTN, hyperlipidemia. However, he did have an NSTEMI after cycle 2 of O-CHOP. Was likely unrelated to chemotherapy, but opted to switch treatment to Obinutuzumab-Bendamustine. He completed treatment in 5/2019 and had a CR based on PET. History of Present Illness:   Mr. Zapata is a 45 y.o. male with HTN who comes in for follow up of Follicular lymphoma, now on surveillance. 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 He does have some increased stress related to this. Denies dizziness or lightheadedness. Reports intermittent chest pain, no CP currently. Reports taking all of his cardiac medications. Reports eating and drinking well. Reports intermittent diarrhea; but improved with addition of fiber. He is still smoking. FAMILY HISTORY:  His father passed away from pancreatic cancer and also had a history of leukemia. Past Medical History:   Diagnosis Date    Anxiety     Cancer [facility])     lymphoma Nov 2018 receiving chemo    Chronic pain     lower back- lymphoma    Hyperlipidemia     Hypertension     Lymphadenopathy 11/12/2018      Past Surgical History:   Procedure Laterality Date    HX HEART CATHETERIZATION  02/2019    HX OTHER SURGICAL  02/2019    cardiac stent    IR INJECTION PSEUDOANEURYSM  2/26/2019      Social History     Tobacco Use    Smoking status: Current Every Day Smoker     Packs/day: 0.50     Years: 20.00     Pack years: 10.00    Smokeless tobacco: Never Used   Substance Use Topics    Alcohol use: No     Frequency: Never   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 Family History   Problem Relation Age of Onset    Hypertension Father     Diabetes Father     Diabetes Mother    Father had leukemia.      Current Outpatient Medications   Medication Sig    ALPRAZolam (XANAX) 1 mg tablet Take 1 tab in the morning, 1/2 tab mid-day, 1 tab in the evening  Indications: anxious    oxyCODONE IR (ROXICODONE) 5 mg immediate release tablet Take 1 Tab by mouth every four (4) hours as needed for Pain for up to 30 days. Max Daily Amount: 30 mg.    metoprolol succinate (TOPROL-XL) 50 mg XL tablet TAKE 1 TABLET BY MOUTH EVERY DAY    naloxone (NARCAN) 4 mg/actuation nasal spray Use 1 spray intranasally, then discard. Repeat with new spray every 2 min as needed for opioid overdose symptoms, alternating nostrils.  escitalopram oxalate (LEXAPRO) 20 mg tablet Take 1 Tab by mouth daily.  potassium chloride (KLOR-CON) 10 mEq tablet Take 4 Tabs by mouth daily.  lisinopril (PRINIVIL, ZESTRIL) 20 mg tablet Take 1 Tab by mouth daily.  CHANTIX 1 mg tablet TAKE 1 TABLET BY MOUTH TWICE A DAY    atorvastatin (LIPITOR) 40 mg tablet Take 1 Tab by mouth nightly. Indications: treatment to slow progression of coronary artery disease    clopidogrel (PLAVIX) 75 mg tab 1 Tab by Per NG tube route daily.  aspirin delayed-release (ASPIR-81) 81 mg tablet Take 1 Tab by mouth daily. No current facility-administered medications for this visit. No Known Allergies     Review of Systems: A complete review of systems was obtained, negative except as described above. Physical Exam:     Visit Vitals  BP (!) 165/102   Pulse 71   Temp 97.7 °F (36.5 °C) (Oral)   Resp 16   Ht 5' 7\" (1.702 m)   Wt 159 lb (72.1 kg)   SpO2 97%   BMI 24.90 kg/m²     ECOG PS: 0  General: Well developed, no acute distress, smells like cigarette smoke  Eyes: PERRLA, EOMI, anicteric sclerae  HENT: Atraumatic, OP clear, poor dentition,TMs intact without erythema  Neck: Supple  Lymphatic: No cervical, supraclavicular, axillary, inguinal lymphadenopathy  Respiratory: CTAB, normal respiratory effort  CV: Normal rate, regular rhythm, no murmurs, no peripheral edema  GI: Soft, nontender, nondistended, no masses, no hepatomegaly, no splenomegaly  MS: Normal gait and station.  Digits without clubbing or cyanosis. Skin: No rashes, ecchymoses, or petechiae. Normal temperature, turgor, and texture. Neuro/Psych: Alert, oriented. 5/5 strength in all 4 extremities. Appropriate affect, normal judgment/insight. Results:     Lab Results   Component Value Date/Time    WBC 6.4 12/05/2019 10:45 AM    HGB 13.3 12/05/2019 10:45 AM    HCT 39.4 12/05/2019 10:45 AM    PLATELET 684 62/16/5510 10:45 AM    MCV 99.2 (H) 12/05/2019 10:45 AM    ABS. NEUTROPHILS 3.9 12/05/2019 10:45 AM    Hemoglobin (POC) 15.0 06/05/2009 02:13 PM    Hematocrit (POC) 39 02/14/2019 01:24 PM     Lab Results   Component Value Date/Time    Sodium 139 01/07/2020 12:02 PM    Potassium 4.5 01/07/2020 12:02 PM    Chloride 104 01/07/2020 12:02 PM    CO2 30 01/07/2020 12:02 PM    Glucose 78 01/07/2020 12:02 PM    BUN 8 01/07/2020 12:02 PM    Creatinine 1.05 01/07/2020 12:02 PM    GFR est AA >60 01/07/2020 12:02 PM    GFR est non-AA >60 01/07/2020 12:02 PM    Calcium 9.1 01/07/2020 12:02 PM    Sodium (POC) 136 02/14/2019 01:24 PM    Potassium (POC) 3.9 02/14/2019 01:24 PM    Chloride (POC) 102 02/14/2019 01:24 PM    Glucose (POC) 249 (H) 02/15/2019 10:21 PM    BUN (POC) 14 02/14/2019 01:24 PM    Creatinine (POC) 0.9 02/14/2019 01:24 PM    Calcium, ionized (POC) 1.24 02/14/2019 01:24 PM     Lab Results   Component Value Date/Time    Bilirubin, total 0.4 12/05/2019 10:45 AM    ALT (SGPT) 27 12/05/2019 10:45 AM    AST (SGOT) 19 12/05/2019 10:45 AM    Alk.  phosphatase 159 (H) 12/05/2019 10:45 AM    Protein, total 6.1 (L) 12/05/2019 10:45 AM    Albumin 3.2 (L) 12/05/2019 10:45 AM    Globulin 2.9 12/05/2019 10:45 AM     No results found for: IRON, FE, TIBC, IBCT, PSAT, FERR    No results found for: B12LT, FOL, RBCF  Lab Results   Component Value Date/Time    TSH 1.53 09/28/2016 04:40 AM     11/11/18:     Lab Results   Component Value Date/Time    Hepatitis A, IgM NONREACTIVE 12/27/2018 04:53 PM    Hepatitis B surface Ag <0.10 12/27/2018 04:53 PM    Hepatitis B core, IgM NONREACTIVE 2018 04:53 PM         Imagin/9/18 Abd/pelvis CT: IMPRESSION:  1. Interval development of a large retroperitoneal mass encircling the aorta with invasion of the left renal hilum and left adrenal gland. Several adjacent lymph nodes are seen extending into the peritoneum and underneath the  diaphragmatic natalie. This most likely represents lymphoma. 2. Several new bilateral enlarged inguinal lymph nodes also likely representing lymphoma. 3. Moderate left hydronephrosis with a delayed renal nephrogram related to decreased renal function. This is related to the invasion of the renal hilum. 18 Chest CT: IMPRESSION:  Trace left pleural effusion. Bilateral lower lobe atelectasis. Large  retroperitoneal mass lesion again demonstrated. PET 18:  FINDINGS:  HEAD/NECK: Right palatine tonsil intense hypermetabolism, max SUV 18. Multilevel  bilateral cervical adenopathy, with max SUV 12 in a left supraclavicular node. Cerebral evaluation is limited by normal intense activity. CHEST: Solitary hypermetabolic left axillary node, max SUV 11. ABDOMEN/PELVIS: Bulky retroperitoneal mass max SUV 27, with several additional  small active abdomino-pelvic nodes. Bilateral inguinal nodes with max SUV 12 on  the left. SKELETON: No foci of abnormal hypermetabolism in the axial and visualized  appendicular skeleton. IMPRESSION:   1. Right palatine tonsil tumor involvement (Deauville 5). 2. Bilateral cervical delaney involvement (Deauville 5). 3. Left axillary node involvement (Deauville 5). 4. Bulky retroperitoneal lymphoma mass and additional smaller hypermetabolic  abdomino-pelvic nodes (Deauville 5). 5. Bilateral inguinal delaney involvement (Deauville 5). Deauville Five Point Scale  1. No uptake or no residual uptake (when used interim)  2. Slight uptake, but below blood pool (mediastinum)  3.  Uptake above mediastinal, but below/equal to uptake in the liver  4. Uptake slightly to moderately higher than liver  5. Markedly increased uptake or any new lesion (on response evaluation)  Each FDG-avid (or previously FDG avid) lesion is rated independently. Reference values:  Mediastinal blood pool: 2.1 SUV  Liver (background): 2.2 SUV    PET/CT 2/05/19:   IMPRESSION:  1. No Foci of Abnormal Hypermetabolism (Deauville 1). 2. Resolved activity in the right palatine tonsil, bilateral cervical nodes,left axillary node, retroperitoneal/abdominal pelvic adenopathy, bilateral inguinal nodes. Echo 2/14/19:  Normal cavity size, wall thickness and systolic function (ejection fraction normal). The muscle mass is normal. The cavity shape is normal. The estimated ejection fraction is 41 - 45%. Abnormal wall motion as described on the wall scoring diagram below. End-systolic volume is normal. Normal left ventricular strain. There is mild (grade 1) left ventricular diastolic dysfunction. Normal left ventricular diastolic pressure. End-diastolic volume is normal.    LE arterial duplex 2/22/19:  There is evidence of left groin pseudoaneurysm noted arising from distal common femoral artery, pseudo lobe measures 2.32cm x 2.58cm and pseudo neck length measuring 0.63cm. There is no evidence of hemodynamically significant left lower extremity arterial obstruction. JACLYN is 1.03 on the right and 1.02 on the left. LE arterial duplex s/p Thrombin Injection to Pseudoaneurysm 2/26/19:  Successful thrombin injection procedure of the left groin with no further flow seen. No evidence of hemodnyamically significant obstruction in the left lower extremity. Left lower extremity arterial duplex performed. Confirmed pseudoaneurysm in left groin with small neck. Following thrombin injection, no further flow seen in the pseudoaneurysm. The left common femoral, profunda femoral, femoral, popliteal, posterior tibial and anterior arteries were imaged.   Mainly triphasic flow was seen with no evidence of significantly elevated velocities. Repeat LE arterial duplex 2/27/19:  Continued thrombosed left groin pseudoaneurysm following thrombin injection on 02/26/2019. No flow or color fill is identified. The hematoma measures approximately 2.1 x 2.9 cm in diameter. The common femoral, deep femoral, femoral, and popliteal arteries are patent with mainly tri-phasic flow and no significant hemodynamically obstruction is noted. Stress 5/31/19:  · Normal stress myocardial perfusion without ischemia or infact at 84% MPHR. Normal LV function. LVEF 60%. · No EKG changes of ischemia at peak exercise. · Normal functional capacity. PET 6/03/19: IMPRESSION: No Foci of Abnormal Hypermetabolism (Deauville 1). -MRI lumbar spine 12/18/19:  Mild disc degenerative change at L3-4 and L4-5. Mild canal stenosis at L3-4 and mild left foraminal stenosis at L4-5. Other less severe degenerative findings are as described above. Continued diminished size of retroperitoneal mass-adenopathy,  with diminished soft tissue density at the left renal hilum. -CT chest/abdomen 12/16/19:  Findings are consistent with interval response to therapy       Assessment & Plan:   Consuelo Motley is a 45 y.o. male comes in for evaluation and management of lymphoma. 1. Follicular lymphoma: Grade 3a. Although grade 3a disease is considered more indolent and can be treated like grade 1/2 disease, this patient has indications for treatment: bulky disease encircling the aorta causing symptoms. Bone marrow negative for lymphoma, but was hypercellular. BR better than RCHOP, but based on GALLIUM study, Obinutuzumab-based induction and maintenance prolongs PFS over that seen with rituximab-based therapy. Therefore, I have chosen to treat patient with O-CHOP regimen for 6 cycles, followed by possible maintenance Obinutuzumab. We discussed the risks and benefits of O-CHOP chemotherapy.  The potential side effects include, but are not limited to: nausea, vomiting, diarrhea, constipation, Flu-like symptoms, infusion reaction, allergic reaction, flushing, taste changes, increased risk of infection, anemia, fatigue, alopecia, neuropathy, nail changes, cardiac damage, mucositis, myleosuppression, infertility and rarely, death. Patient completed chemoteaching and received a chemotherapy education folder. CR after 2 cycles of O-CHOP, but switched regiment Bendamustine-O due to cardiac event. Chemotherapy consent signed and patient educated about side effects including: cytopenias, infections, bleeding, n/v/d, hair loss, skin rash, secondary malignancy, kidney or liver toxicity. Completed a total of 4 cycles of Bendamustine-O, completed 5/2019. PET completed 6/3/19 showed CR. CT 12/19 negative for disease. -- Surveillance labs, MD visit every 3 months, and CT imaging q6mo x 2 yrs. -- 6 weeks port flush  -- Repeat CT c/a/p due 6/2020.   -- Follow up in 3 months labs, MD visit. 2. H/o cardiotoxic chemotherapy: Discussed risks/benefits of using doxorubicin. Echo on 12/17/18 showed EF 65%, but drop to EF 45% immediately after MI. Followed by Dr. Aster Daley. 3. Chronic lumbar back pain/anxiety: Left lower back pain. Signed pain contract on 12/28/18 and following with Dr. Yisel Em.   -- Oxycodone 5 mg PRN prescribed by Dr. Drew Luong. -- Ativan 0.5mg bid taking TID  -- Lexapro 20mg daily for anxiety. 4. HTN / Hyperlipidemia: Uncontrolled today and may be due to stress with going back to work. Pt reports compliance with meds, on BB, ACEI. Advised pt needs a PCP to better monitor his BP.  -- Asked pt to start a 20min/day walking program for stress relief. -- Asking Dr. Aster Daley about dose adjustment of his meds. Plan to recheck BP in 3 months. 5. Tobacco abuse: Reiterated strong recommendation for smoking cessation in the setting of recent MI. Discussed cessation strategies and pt does not want Wellbutrin given past experience with it.  Previously did well on Chantix 1mg bid and had cut down to 8 cigs/day. Patient stopped taking chantix because \"it didn't work. \"     7. H/o STEMI / Cardiac arrest: P/w CP on 2/14/19 followed by collapse and cardiac arrest. Cardiac cath at [facility] by Dr. Catrachita Dykes revealed 90-95% occlusion of proximal LAD, TOM placed. Dr. Catrachita Dykes [phone]. Dr. Jose Hudson recommends: Continue aspirin and Plavix along with high-dose Lipitor and metoprolol. 5/31/19 Stress test normal with EF 65%. 7/29 appointment with Marjorie, patient went to appointment but stated he was \"forgotten about,\" and will make a follow up this month (September). -- f/u Dr. Jose Hudson in September 2020.  -- On dual antiplatelet therapy aspirin and clopidogrel  -- On BB, ACEI, statin       8. Hypokalemia: Improved. Could be medication related; lexapro <1% SE of hypokalemia or due to diarrhea. -- Potassium 40 meq daily (called in 10 meq tabs for a smaller size)    -- Foods high in K (sweet potatoes, spinach etc)     Emotional well being: Pt is coping well with his/her disease and has excellent support. Met with SW in the past as well as today. I appreciate the opportunity to participate in  Tarah Lyle .'s care.       Signed By: Latisha Jalloh MD     March 5, 2020

## 2020-03-02 ENCOUNTER — TELEPHONE (OUTPATIENT)
Dept: PALLATIVE CARE | Age: 45
End: 2020-03-02

## 2020-03-02 NOTE — TELEPHONE ENCOUNTER
Patient calling to r/s his appt on 3/4/20 from 10;30am to 12:30pm at [facility]. Information confirmed.

## 2020-03-03 ENCOUNTER — DOCUMENTATION ONLY (OUTPATIENT)
Dept: PALLATIVE CARE | Age: 45
End: 2020-03-03

## 2020-03-04 ENCOUNTER — OFFICE VISIT (OUTPATIENT)
Dept: PALLATIVE CARE | Age: 45
End: 2020-03-04

## 2020-03-04 VITALS
OXYGEN SATURATION: 98 % | RESPIRATION RATE: 16 BRPM | TEMPERATURE: 98.3 F | HEART RATE: 75 BPM | HEIGHT: 67 IN | BODY MASS INDEX: 24.58 KG/M2 | DIASTOLIC BLOOD PRESSURE: 97 MMHG | WEIGHT: 156.6 LBS | SYSTOLIC BLOOD PRESSURE: 158 MMHG

## 2020-03-04 DIAGNOSIS — F41.9 ANXIETY: ICD-10-CM

## 2020-03-04 DIAGNOSIS — F32.9 REACTIVE DEPRESSION: ICD-10-CM

## 2020-03-04 DIAGNOSIS — C82.33 FOLLICULAR LYMPHOMA GRADE IIIA OF INTRA-ABDOMINAL LYMPH NODES (HCC): ICD-10-CM

## 2020-03-04 DIAGNOSIS — G89.29 CHRONIC LEFT-SIDED LOW BACK PAIN WITHOUT SCIATICA: Primary | ICD-10-CM

## 2020-03-04 DIAGNOSIS — M54.50 CHRONIC LEFT-SIDED LOW BACK PAIN WITHOUT SCIATICA: Primary | ICD-10-CM

## 2020-03-04 RX ORDER — OXYCODONE HYDROCHLORIDE 5 MG/1
5 TABLET ORAL
Qty: 80 TAB | Refills: 0 | Status: SHIPPED | OUTPATIENT
Start: 2020-04-03 | End: 2020-05-03

## 2020-03-04 RX ORDER — ALPRAZOLAM 1 MG/1
TABLET ORAL
Qty: 60 TAB | Refills: 0 | Status: SHIPPED | OUTPATIENT
Start: 2020-03-04 | End: 2020-05-05 | Stop reason: SDUPTHER

## 2020-03-04 RX ORDER — ALPRAZOLAM 1 MG/1
TABLET ORAL
Qty: 45 TAB | Refills: 0 | Status: SHIPPED | OUTPATIENT
Start: 2020-04-03 | End: 2020-04-28 | Stop reason: SDUPTHER

## 2020-03-04 RX ORDER — OXYCODONE HYDROCHLORIDE 5 MG/1
5 TABLET ORAL
Qty: 80 TAB | Refills: 0 | Status: SHIPPED | OUTPATIENT
Start: 2020-03-04 | End: 2020-05-05 | Stop reason: SDUPTHER

## 2020-03-04 NOTE — PROGRESS NOTES
Palliative Medicine Outpatient Services  [place]: [phone])    Patient Name: Negrita Coronel. YOB: 1975    Date of Current Visit: 03/04/20  Location of Current Visit:    [] [facility] Office  [x] [facility] Office  [] [address] [place] [address]  [] Home  [] Other:      Date of Initial Visit: 2/19/19   Referral from: Lo Hills MD  Primary Care Physician: None      SUMMARY:   Negrita Hernandez is a 45y.o. year old with high-grade follicular lymphoma, who was referred to Palliative Medicine by Dr. Shannon Henson for symptom management and supportive care. He was diagnosed in 11/2018 after presenting with back pain. He is currently receiving systemic chemotherapy. He suffered cardiac arrest during cycle #3 due to previously undiagnosed severe stenosis of the LAD s/p PTCA and stent. He has since completed systemic chemotherapy. His most recent PET-CT (5/2019) showed no focal areas of increased hypermetabolic uptake. The patients social history includes: he is single. He lives in [place]. He has 3 teenage children who live with their mother and with whom he has close contact. 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 He's applied for disability. Palliative Medicine is addressing the following current patient/family concerns: anxiety, depression, left mid- and low back pain, poor appetite, fatigue, advanced care planning. Initial Referral Intake note from Dallin Alanis RN reviewed prior to visit   PALLIATIVE DIAGNOSES:       ICD-10-CM ICD-9-CM    1. Chronic left-sided low back pain without sciatica M54.5 724.2 oxyCODONE IR (ROXICODONE) 5 mg immediate release tablet    G89.29 338.29 oxyCODONE IR (ROXICODONE) 5 mg immediate release tablet   2. Anxiety F41.9 300.00 ALPRAZolam (XANAX) 1 mg tablet      ALPRAZolam (XANAX) 1 mg tablet   3. Reactive depression F32.9 300.4    4.  Follicular lymphoma grade IIIa of intra-abdominal lymph nodes (HCC) C82.33 202.03 oxyCODONE IR (ROXICODONE) 5 mg immediate release tablet oxyCODONE IR (ROXICODONE) 5 mg immediate release tablet          PLAN:   Patient Instructions     Dear Paolo Mercedes. ,    It was a pleasure seeing you today in [facility]. We will see you again in 8 weeks    If labs or imaging tests have been ordered for you today, please call the office at [phone] 48 hours after completion to obtain the results. Your described symptoms were: Fatigue: 5 Drowsiness: 3   Depression: 5 Pain: 7   Anxiety: 10 Nausea: 0   Anorexia: 5 Dyspnea: 1   Best Well-Bein Constipation: No   Other Problem (Comment): 0       This is the plan we talked about:      1. 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 2. Anxiety  -Continue escitalopram to 20-mg daily.   -We're slowly tapering your Xanax by decreasing your daily Xanax dose by ½ mg a month over 6 months:    Morning Mid-day Evening  Month #1 1-mg  ½-mg  1-mg  Month#2 ½-mg  ½-mg  1-mg - 's prescription  Month#3 ½-mg  ½-mg  ½-mg - April's prescription  Month#4 ½-mg    ½-mg  Month#5   ½-mg  Month#6 taper complete    -Today I gave a 1-month Xanax prescription (#60) to your pharmacy with the new dose for month #2 which you get fillwstoday  -I also gave you a prescription for a 1-month Xanax prescription (#45) with the new dose for month #3 to be filled on  -Tapan Ovalleacle is available to you for supportive counseling and development of techniques to manage your anxiety    3.  Lymphoma  -You last scans showed no active disease  -You have a follow-up appointment tomorrow with Dr. Carl Palma  -She sees you now every 3 months        This is what you have shared with us about Advance Care Planning:      Primary Decision Maker: [de-identified] - Ex-Spouse - [phone]    Secondary Decision Maker: Sherrill Mosquera - Other Relative - [phone]  [] Named in a scanned document   [x] Legal Next of Kin  [] Guardian    ACP documents you current have include:  [] Advance Directive or Living Will  [] Durable Do Not Resuscitate  [] Physician Orders for Scope of Treatment (POST)  [] Medical Power of   [] Other      The Palliative Medicine Team is here to support you and your family.          Sincerely,      Dearl MD Esmer and the Palliative Medicine Team                Counseling and Coordination:        GOALS OF CARE / TREATMENT PREFERENCES:   [====Goals of Care====]  GOALS OF CARE:  Patient / health care proxy stated goals: See Patient Instructions / Summary    TREATMENT PREFERENCES:   Code Status:  [x] Attempt Resuscitation       [] Do Not Attempt Resuscitation    Advance Care Planning:  [x] [facility] Interdisciplinary Team has updated the ACP Navigator with Decision Maker and Patient Capacity      Primary Decision Maker: [de-identified] - Ex-Spouse - [phone]    Secondary Decision Maker: Sherrill Mosquera - Other Relative - [phone]  [] Named in a scanned document   [x] Legal Next of Kin  [] Guardian    Other:  (If patient appropriate for POST, consider using PALLPOST smart phrase here)    The palliative care team has discussed with patient / health care proxy about goals of care / treatment preferences for patient.  [====Goals of Care====]     PRESCRIPTIONS GIVEN:     Medications Ordered Today   Medications    ALPRAZolam (XANAX) 1 mg tablet     Sig: Take 1/2 tab in the morning, 1/2 tab mid-day, 1 tab in the evening  Indications: anxious     Dispense:  60 Tab     Refill:  0    oxyCODONE IR (ROXICODONE) 5 mg immediate release tablet     Sig: Take 1 Tab by mouth every four (4) hours as needed for Pain for up to 30 days. Max Daily Amount: 30 mg. Dispense:  80 Tab     Refill:  0    oxyCODONE IR (ROXICODONE) 5 mg immediate release tablet     Sig: Take 1 Tab by mouth every four (4) hours as needed for Pain for up to 30 days. Max Daily Amount: 30 mg. Dispense:  80 Tab     Refill:  0    ALPRAZolam (XANAX) 1 mg tablet     Sig: Take 1/2 tab in the morning, 1/2 tab mid-day, 1/2 tab in the evening  Indications: anxious     Dispense:  45 Tab     Refill:  0           FOLLOW UP:     Future Appointments   Date Time Provider Dahlia Epps   3/5/2020  2:00 PM [facility]   3/5/2020  2:30 PM Antonia Richmond MD [facility] SCHED   4/2/2020 10:00 AM [facility]   5/5/2020 12:30 PM Marcel Montalvo  [place] IN CARE:   Patient Care Team:  None as PCP - Rae Ellis MD (Hematology and Oncology)  Krissy John MD as Physician (Palliative Medicine)  Krissy John MD as Physician (Palliative Medicine)       HISTORY:   Reviewed patient-completed ESAS and advance care planning form. Reviewed patient record in prescription monitoring program.    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 He's feeling more anxious but he still has 1/2 Xanax left. He wasn't able to make it in to see [facility] because he didn't have a car for awhile. He went to court for his disability appeal but hasn't heard anything yet. His appetite is OK. He's moving his bowels regularly. He has an appointment with Dr. Carl Palma tomorrow.     Clinical Pain Assessment (nonverbal scale for nonverbal patients):   [++++ Clinical Pain Assessment++++]  [++++Pain Severity++++]: Pain: 7  [++++Pain Character++++]: stabbing pain in back  [++++Pain Duration++++]: months for back pain, weeks for groin pain  [++++Pain Effect++++]: little  [++++Pain Factors++++]: oxycodone helps with back pain, groin pain elicited by standing and walking  [++++Pain Frequency++++]: constant back pain with varying intensity  [++++Pain Location++++]: left lower back  [++++ Clinical Pain Assessment++++]       FUNCTIONAL ASSESSMENT:     Palliative Performance Scale (PPS):  PPS: 80       PSYCHOSOCIAL/SPIRITUAL SCREENING:     Any spiritual / Rastafarian concerns:  [] Yes /  [x] No    Caregiver Burnout:  [] Yes /  [x] No /  [] No Caregiver Present      Anticipatory grief assessment:   [x] Normal  / [] Maladaptive       ESAS Anxiety: Anxiety: 10    ESAS Depression: Depression: 5       REVIEW OF SYSTEMS:     The following systems were [x] reviewed / [] unable to be reviewed  Systems: constitutional, ears/nose/mouth/throat, respiratory, gastrointestinal, genitourinary, musculoskeletal, integumentary, neurologic, psychiatric, endocrine. Positive findings noted below. Modified ESAS Completed by: provider   Fatigue: 5 Drowsiness: 3   Depression: 5 Pain: 7   Anxiety: 10 Nausea: 0   Anorexia: 5 Dyspnea: 1   Best Well-Bein Constipation: No   Other Problem (Comment): 0          PHYSICAL EXAM:     Wt Readings from Last 3 Encounters:   20 156 lb 9.6 oz (71 kg)   20 161 lb 6.4 oz (73.2 kg)   20 156 lb 6.4 oz (70.9 kg)     Blood pressure (!) 158/97, pulse 75, temperature 98.3 °F (36.8 °C), temperature source Oral, resp. rate 16, height 5' 7\" (1.702 m), weight 156 lb 9.6 oz (71 kg), SpO2 98 %.   Last bowel movement: See Nursing Note    Constitutional: sitting in chair, appears rested  Eyes: pupils equal, anicteric  ENMT: no nasal discharge, moist mucous membranes  Cardiovascular: regular rhythm, no peripheral edema  Respiratory: breathing not labored, symmetric  Gastrointestinal: soft non-tender, +bowel sounds  Musculoskeletal: no deformity; point tenderness left mid-paraspinal musculature  Skin: warm, dry  Neurologic: following commands, moving all extremities, normal gait; bilateral quadriceps and hamstrings 5/5; brisk, symmetric patellar reflexes  Psychiatric: full affect, no hallucinations  Other:       HISTORY:     Past Medical History:   Diagnosis Date    Anxiety     Cancer [facility])     lymphoma Nov 2018 receiving chemo    Chronic pain     lower back- lymphoma    Hyperlipidemia     Hypertension     Lymphadenopathy 11/12/2018      Past Surgical History:   Procedure Laterality Date    HX HEART CATHETERIZATION  02/2019    HX OTHER SURGICAL  02/2019    cardiac stent    IR INJECTION PSEUDOANEURYSM  2/26/2019      Family History   Problem Relation Age of Onset    Hypertension Father     Diabetes Father     Diabetes Mother       History reviewed, no pertinent family history. Social History     Tobacco Use    Smoking status: Current Every Day Smoker     Packs/day: 0.50     Years: 20.00     Pack years: 10.00    Smokeless tobacco: Never Used   Substance Use Topics    Alcohol use: No     Frequency: Never     No Known Allergies   Current Outpatient Medications   Medication Sig    ALPRAZolam (XANAX) 1 mg tablet Take 1/2 tab in the morning, 1/2 tab mid-day, 1 tab in the evening  Indications: anxious    oxyCODONE IR (ROXICODONE) 5 mg immediate release tablet Take 1 Tab by mouth every four (4) hours as needed for Pain for up to 30 days. Max Daily Amount: 30 mg.    [START ON 4/3/2020] oxyCODONE IR (ROXICODONE) 5 mg immediate release tablet Take 1 Tab by mouth every four (4) hours as needed for Pain for up to 30 days. Max Daily Amount: 30 mg.    [START ON 4/3/2020] ALPRAZolam (XANAX) 1 mg tablet Take 1/2 tab in the morning, 1/2 tab mid-day, 1/2 tab in the evening  Indications: anxious    metoprolol succinate (TOPROL-XL) 50 mg XL tablet TAKE 1 TABLET BY MOUTH EVERY DAY    escitalopram oxalate (LEXAPRO) 20 mg tablet Take 1 Tab by mouth daily.     lisinopril (PRINIVIL, ZESTRIL) 20 mg tablet Take 1 Tab by mouth daily.    atorvastatin (LIPITOR) 40 mg tablet Take 1 Tab by mouth nightly. Indications: treatment to slow progression of coronary artery disease    clopidogrel (PLAVIX) 75 mg tab 1 Tab by Per NG tube route daily.  aspirin delayed-release (ASPIR-81) 81 mg tablet Take 1 Tab by mouth daily.  naloxone (NARCAN) 4 mg/actuation nasal spray Use 1 spray intranasally, then discard. Repeat with new spray every 2 min as needed for opioid overdose symptoms, alternating nostrils.  potassium chloride (KLOR-CON) 10 mEq tablet Take 4 Tabs by mouth daily.  CHANTIX 1 mg tablet TAKE 1 TABLET BY MOUTH TWICE A DAY     No current facility-administered medications for this visit. LAB DATA REVIEWED:     Lab Results   Component Value Date/Time    WBC 6.4 12/05/2019 10:45 AM    HGB 13.3 12/05/2019 10:45 AM    PLATELET 889 99/43/0825 10:45 AM     Lab Results   Component Value Date/Time    Sodium 139 01/07/2020 12:02 PM    Potassium 4.5 01/07/2020 12:02 PM    Chloride 104 01/07/2020 12:02 PM    CO2 30 01/07/2020 12:02 PM    BUN 8 01/07/2020 12:02 PM    Creatinine 1.05 01/07/2020 12:02 PM    Calcium 9.1 01/07/2020 12:02 PM    Magnesium 1.7 01/12/2019 04:05 AM    Phosphorus 2.0 (L) 01/12/2019 04:05 AM      Lab Results   Component Value Date/Time    AST (SGOT) 19 12/05/2019 10:45 AM    Alk. phosphatase 159 (H) 12/05/2019 10:45 AM    Protein, total 6.1 (L) 12/05/2019 10:45 AM    Albumin 3.2 (L) 12/05/2019 10:45 AM    Globulin 2.9 12/05/2019 10:45 AM     Lab Results   Component Value Date/Time    INR 1.1 02/22/2019 08:18 PM    Prothrombin time 10.8 02/22/2019 08:18 PM    aPTT 27.8 02/22/2019 08:18 PM      No results found for: IRON, FE, TIBC, IBCT, PSAT, FERR       MRI lumbar spine 12/18/19:  Congenital narrowing of the lumbar canal. Vertebral body heights are maintained. Marrow signal is normal.     The conus medullaris terminates at T12/L1. Signal and caliber of the distal  spinal cord are within normal limits.  There is no pathologic intrathecal  enhancement.     The paraspinal soft tissues are within normal limits.     Lower thoracic spine: No herniation or stenosis.     L1-L2: No herniation or stenosis.     L2-L3: No herniation or stenosis.     L3-L4: Mild facet arthropathy. Minimal central disc protrusion. Mild canal  stenosis. Foramina are patent     L4-L5: Desiccation. Mild facet arthropathy. Canal demonstrates minimal stenosis. There is an annular ligament tear far laterally on the left. Mild left foraminal  stenosis.     L5-S1: Mild facet arthropathy. Canal and foramina are patent. IMPRESSION:  Mild disc degenerative change at L3-4 and L4-5.     Mild canal stenosis at L3-4 and mild left foraminal stenosis at L4-5.     Other less severe degenerative findings are as described above. CT chest/abdomen 12/16/19:  FINDINGS:      THYROID: No nodule. MEDIASTINUM: No mass or lymphadenopathy. Port catheter in place on the right. Catheter tip in appropriate position. SB: No mass or lymphadenopathy. THORACIC AORTA: No dissection or aneurysm. MAIN PULMONARY ARTERY: Unremarkable  TRACHEA/BRONCHI: Unremarkable  ESOPHAGUS: No wall thickening or dilatation. HEART: Normal in size. PLEURA: No effusion or pneumothorax. LUNGS: Bibasilar atelectasis is noted. Torie Barrio LIVER: No mass or biliary dilatation. GALLBLADDER: Layering contrast versus cholelithiasis. SPLEEN: No mass. PANCREAS: No mass or ductal dilatation. ADRENALS: The left adrenal gland is elevated by the retroperitoneal mass. The    right is unremarkable. KIDNEYS: There is diminished soft tissue density at the level of the left renal  hilum. There is increased left renal cortical atrophy. STOMACH: Unremarkable. SMALL BOWEL: No dilatation or wall thickening. COLON: No dilatation or wall thickening. APPENDIX: Unremarkable. PERITONEUM: No ascites or pneumoperitoneum. RETROPERITONEUM:   Diminished size of retroperitoneal mass.       Diminished in size with margins difficult to fully ascertain. 2-62 35 x 50 mm previously 71 x 94 mm.     2-67 left periaortic adenopathy 25 x 17 mm  The SMA, celiac, and LIZZY are remain encased, diminished attenuation of these  vessels.      REPRODUCTIVE ORGANS: The prostate and seminal vesicles are unremarkable. URINARY  BLADDER: Unremarkable  BONES: No destructive bone lesion. ADDITIONAL COMMENTS: Resolved left inguinal pseudoaneurysm. Jordan Acton RECIST   Baseline examination     TARGET LESIONS:         Lesion (description)         Location (series/slice)                Size                                              1. 2-62 retroperitoneal soft tissue    35 x 50 mm     2. 2-67 left periaortic adenopathy 25 x 17 mm     3.     4.     IMPRESSION:    Baseline research examination. Findings are consistent with interval response to therapy.      Continued diminished size of retroperitoneal mass-adenopathy,  with diminished soft tissue density at the left renal hilum.      Clinical Pain Assessment (nonverbal scale for nonver     CONTROLLED SUBSTANCES SAFETY ASSESSMENT (IF ON CONTROLLED SUBSTANCES):     Reviewed opioid safety handout:  [x] Yes   [] No  24 hour opioid dose >150mg morphine equivalent/day:  [] Yes   [x] No  Benzodiazepines:  [x] Yes   [] No  Sleep apnea:  [] Yes   [x] No  Urine Toxicology Testing within last 6 months:  [] Yes   [x] No  History of or new aberrant medication taking behaviors:  [] Yes   [x] No  Has Narcan been prescribed [x] Yes   [] No          Total time:   Counseling / coordination time:   > 50% counseling / coordination?:

## 2020-03-04 NOTE — PROGRESS NOTES
Palliative Medicine Office Visit  Palliative Medicine Nurse Check In  [phone]    Patient Name: Galdino Fuentes. YOB: 1975      Date of Office Visit:  3/4/2020      Patient states:  Patient stated he started back working 3 weeks ago. It's been painful and anxiety. From Check In Sheet (scanned in Media):  Has a medical provider talked with you about cardiopulmonary resuscitation (CPR)? [x] Yes   [] No   [] Unable to obtain    Nurse reminder to complete or update ACP FlowSheet:    Is ACP on the Problem List?    [] Yes    [x] No  IF ACP Document is ON FILE; Nurse to place ACP on Problem List     Is there an ACP Note in Chart Review/Note? [] Yes    [x] No   If NO: [facility] [address] Planning 1/7/2020   Patient's Healthcare Decision Maker is: Verbal statement (Legal Next of Kin remains as decision maker)   Confirm Advance Directive None   Patient Would Like to Complete Advance Directive No   Does the patient have other document types -       Is there anything that we should know about you as a person in order to provide you the best care possible? Have you been to the ER, urgent care clinic since your last visit? [] Yes   [x] No   [] Unable to obtain    Have you been hospitalized since your last visit? [] Yes   [x] No   [] Unable to obtain    Have you seen or consulted any other health care providers outside of the [address] [place] [address] since your last visit? [] Yes   [x] No   [] Unable to obtain    Functional status (describe):     Last BM:  Last night     accessed (date): Did not bring medication.     Bottle review (for opioid pain medication):  Medication 1:   Date filled:   Directions:   # filled:   # left:   # pills taking per day:  Last dose taken:    Medication 2:   Date filled:   Directions:   # filled:   # left:   # pills taking per day:  Last dose taken:    Medication 3:   Date filled:   Directions:   # filled:   # left:   # pills taking per day:  Last dose taken:    Medication 4:   Date filled:   Directions:   # filled:   # left:   # pills taking per day:  Last dose taken:

## 2020-03-04 NOTE — PATIENT INSTRUCTIONS
Dear Teofilo Gimenez. ,    It was a pleasure seeing you today in [facility]. We will see you again in 8 weeks    If labs or imaging tests have been ordered for you today, please call the office at [phone] 48 hours after completion to obtain the results. Your described symptoms were: Fatigue: 5 Drowsiness: 3   Depression: 5 Pain: 7   Anxiety: 10 Nausea: 0   Anorexia: 5 Dyspnea: 1   Best Well-Bein Constipation: No   Other Problem (Comment): 0       This is the plan we talked about:      1. 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 2. Anxiety  -Continue escitalopram to 20-mg daily.   -We're slowly tapering your Xanax by decreasing your daily Xanax dose by ½ mg a month over 6 months:    Morning Mid-day Evening  Month #1 1-mg  ½-mg  1-mg  Month#2 ½-mg  ½-mg  1-mg - today's prescription  Month#3 ½-mg  ½-mg  ½-mg - April's prescription  Month#4 ½-mg    ½-mg  Month#5   ½-mg  Month#6 taper complete    -Today I gave a 1-month Xanax prescription (#60) to your pharmacy with the new dose for month #2 which you get fillwstoday  -I also gave you a prescription for a 1-month Xanax prescription (#45) with the new dose for month #3 to be filled on  -Guero Stein is available to you for supportive counseling and development of techniques to manage your anxiety    3.  Lymphoma  -You last scans showed no active disease  -You have a follow-up appointment tomorrow with Dr. Brandon Llanes  -She sees you now every 3 months        This is what you have shared with us about Advance Care Planning:      Primary Decision Maker: [de-identified] - Ex-Spouse - [phone]    Secondary Decision Maker: Sherrill Mosquera - Other Relative - [phone]  [] Named in a scanned document   [x] Legal Next of Kin  [] Guardian    ACP documents you current have include:  [] Advance Directive or Living Will  [] Durable Do Not Resuscitate  [] Physician Orders for Scope of Treatment (POST)  [] Medical Power of   [] Other      The Palliative Medicine Team is here to support you and your family.          Sincerely,      Luis Spain MD and the Palliative Medicine Team

## 2020-03-05 ENCOUNTER — HOSPITAL ENCOUNTER (OUTPATIENT)
Dept: INFUSION THERAPY | Age: 45
Discharge: HOME | End: 2020-03-05
Payer: MEDICAID

## 2020-03-05 ENCOUNTER — OFFICE VISIT (OUTPATIENT)
Dept: ONCOLOGY | Age: 45
End: 2020-03-05

## 2020-03-05 VITALS
RESPIRATION RATE: 18 BRPM | HEART RATE: 71 BPM | DIASTOLIC BLOOD PRESSURE: 92 MMHG | SYSTOLIC BLOOD PRESSURE: 157 MMHG | TEMPERATURE: 97.7 F | OXYGEN SATURATION: 98 %

## 2020-03-05 VITALS
WEIGHT: 159 LBS | DIASTOLIC BLOOD PRESSURE: 102 MMHG | HEART RATE: 71 BPM | BODY MASS INDEX: 24.96 KG/M2 | RESPIRATION RATE: 16 BRPM | TEMPERATURE: 97.7 F | HEIGHT: 67 IN | OXYGEN SATURATION: 97 % | SYSTOLIC BLOOD PRESSURE: 165 MMHG

## 2020-03-05 DIAGNOSIS — C82.90 FOLLICULAR LYMPHOMA, UNSPECIFIED FOLLICULAR LYMPHOMA TYPE, UNSPECIFIED BODY REGION (HCC): Primary | ICD-10-CM

## 2020-03-05 DIAGNOSIS — Z85.72 HISTORY OF FOLLICULAR LYMPHOMA: Primary | ICD-10-CM

## 2020-03-05 DIAGNOSIS — Z71.6 TOBACCO ABUSE COUNSELING: ICD-10-CM

## 2020-03-05 DIAGNOSIS — I25.10 CORONARY ARTERY DISEASE INVOLVING NATIVE CORONARY ARTERY OF NATIVE HEART WITHOUT ANGINA PECTORIS: ICD-10-CM

## 2020-03-05 DIAGNOSIS — M54.50 CHRONIC BILATERAL LOW BACK PAIN WITHOUT SCIATICA: ICD-10-CM

## 2020-03-05 DIAGNOSIS — I10 ESSENTIAL HYPERTENSION: ICD-10-CM

## 2020-03-05 DIAGNOSIS — G89.29 CHRONIC BILATERAL LOW BACK PAIN WITHOUT SCIATICA: ICD-10-CM

## 2020-03-05 LAB
ALBUMIN SERPL-MCNC: 3.4 G/DL (ref 3.5–5)
ALBUMIN/GLOB SERPL: 1.3 {RATIO} (ref 1.1–2.2)
ALP SERPL-CCNC: 146 U/L (ref 45–117)
ALT SERPL-CCNC: 23 U/L (ref 12–78)
ANION GAP SERPL CALC-SCNC: 6 MMOL/L (ref 5–15)
AST SERPL-CCNC: 15 U/L (ref 15–37)
BASO+EOS+MONOS # BLD AUTO: 0.7 K/UL (ref 0.2–1.2)
BASO+EOS+MONOS NFR BLD AUTO: 10 % (ref 3.2–16.9)
BILIRUB SERPL-MCNC: 0.4 MG/DL (ref 0.2–1)
BUN SERPL-MCNC: 4 MG/DL (ref 6–20)
BUN/CREAT SERPL: 4 (ref 12–20)
CALCIUM SERPL-MCNC: 8.5 MG/DL (ref 8.5–10.1)
CHLORIDE SERPL-SCNC: 109 MMOL/L (ref 97–108)
CO2 SERPL-SCNC: 27 MMOL/L (ref 21–32)
CREAT SERPL-MCNC: 1.04 MG/DL (ref 0.7–1.3)
DIFFERENTIAL METHOD BLD: ABNORMAL
ERYTHROCYTE [DISTWIDTH] IN BLOOD BY AUTOMATED COUNT: 14.3 % (ref 11.8–15.8)
GLOBULIN SER CALC-MCNC: 2.7 G/DL (ref 2–4)
GLUCOSE SERPL-MCNC: 124 MG/DL (ref 65–100)
HCT VFR BLD AUTO: 38.5 % (ref 36.6–50.3)
HGB BLD-MCNC: 13.2 G/DL (ref 12.1–17)
LDH SERPL L TO P-CCNC: 169 U/L (ref 85–241)
LYMPHOCYTES # BLD: 1.1 K/UL (ref 0.8–3.5)
LYMPHOCYTES NFR BLD: 15 % (ref 12–49)
MCH RBC QN AUTO: 35.4 PG (ref 26–34)
MCHC RBC AUTO-ENTMCNC: 34.3 G/DL (ref 30–36.5)
MCV RBC AUTO: 103.2 FL (ref 80–99)
NEUTS SEG # BLD: 5.5 K/UL (ref 1.8–8)
NEUTS SEG NFR BLD: 75 % (ref 32–75)
PLATELET # BLD AUTO: 247 K/UL (ref 150–400)
POTASSIUM SERPL-SCNC: 2.7 MMOL/L (ref 3.5–5.1)
PROT SERPL-MCNC: 6.1 G/DL (ref 6.4–8.2)
RBC # BLD AUTO: 3.73 M/UL (ref 4.1–5.7)
SODIUM SERPL-SCNC: 142 MMOL/L (ref 136–145)
WBC # BLD AUTO: 7.3 K/UL (ref 4.1–11.1)

## 2020-03-05 PROCEDURE — 80053 COMPREHEN METABOLIC PANEL: CPT

## 2020-03-05 PROCEDURE — 74011000250 HC RX REV CODE- 250: Performed by: NURSE PRACTITIONER

## 2020-03-05 PROCEDURE — 36591 DRAW BLOOD OFF VENOUS DEVICE: CPT

## 2020-03-05 PROCEDURE — 83615 LACTATE (LD) (LDH) ENZYME: CPT

## 2020-03-05 PROCEDURE — 36415 COLL VENOUS BLD VENIPUNCTURE: CPT

## 2020-03-05 PROCEDURE — 77030012965 HC NDL HUBR BBMI -A

## 2020-03-05 PROCEDURE — 85025 COMPLETE CBC W/AUTO DIFF WBC: CPT

## 2020-03-05 PROCEDURE — 74011250636 HC RX REV CODE- 250/636: Performed by: NURSE PRACTITIONER

## 2020-03-05 RX ORDER — SODIUM CHLORIDE 9 MG/ML
10 INJECTION INTRAMUSCULAR; INTRAVENOUS; SUBCUTANEOUS AS NEEDED
Status: DISCONTINUED | OUTPATIENT
Start: 2020-03-05 | End: 2020-03-06 | Stop reason: HOSPADM

## 2020-03-05 RX ORDER — HEPARIN 100 UNIT/ML
500 SYRINGE INTRAVENOUS AS NEEDED
Status: DISCONTINUED | OUTPATIENT
Start: 2020-03-05 | End: 2020-03-06 | Stop reason: HOSPADM

## 2020-03-05 RX ORDER — SODIUM CHLORIDE 0.9 % (FLUSH) 0.9 %
5-10 SYRINGE (ML) INJECTION AS NEEDED
Status: DISCONTINUED | OUTPATIENT
Start: 2020-03-05 | End: 2020-03-06 | Stop reason: HOSPADM

## 2020-03-05 RX ADMIN — SODIUM CHLORIDE 10 ML: 9 INJECTION, SOLUTION INTRAMUSCULAR; INTRAVENOUS; SUBCUTANEOUS at 14:27

## 2020-03-05 RX ADMIN — Medication 500 UNITS: at 14:27

## 2020-03-05 RX ADMIN — Medication 10 ML: at 14:27

## 2020-03-05 NOTE — PROGRESS NOTES
\[facility]\"" Progress Note    Date: 2020    Name: Ani Dempsey. MRN: [id number]         : 1975    Mr. Harsh Gurrola Arrived ambulatory and in no distress for Port flush/lab Regimen. Assessment was completed, no acute issues at this time, no new complaints voiced. right chest wall port accessed without difficulty, labs drawn & sent for processing. Mr. Karthik Ly vitals were reviewed. Visit Vitals  BP (!) 157/92   Pulse 71   Temp 97.7 °F (36.5 °C)   Resp 18   SpO2 98%       Labs obtained and pending, please see [facility]. Mr. Harsh Gurrola  was discharged from [place] [address] in stable condition . Port de-accessed, flushed & heparinized per protocol. He is to return on 20 for his next appointment.     Willa River RN  2020

## 2020-03-05 NOTE — PROGRESS NOTES
Batsheva Mcgill is a 45 y.o. male here today for follow up of follicular lymphoma. BP high today, 165/102. Patient confirms he has taken BP meds today. States BP high yesterday as well.

## 2020-03-06 DIAGNOSIS — E87.6 HYPOKALEMIA: Primary | ICD-10-CM

## 2020-03-06 NOTE — PROGRESS NOTES
Your blood counts and kidney function look great. Your potassium level is low again. Please make sure you are taking your potassium supplements 40 mEQ daily and eating potassium rich foods. Please call us if you need more potassium supplements.

## 2020-03-06 NOTE — PROGRESS NOTES
Attempted to call patient again via home/cell number listed. No answer, left message requesting that patient return call. Provided office phone number as well for patient to call back.

## 2020-03-09 ENCOUNTER — TELEPHONE (OUTPATIENT)
Dept: ONCOLOGY | Age: 45
End: 2020-03-09

## 2020-03-09 NOTE — PROGRESS NOTES
Left additional message via home/cell number listed requesting that patient return call. Provided office phone number for him to call back. Asked that patient alert front staff of convenient time for return call if this nurse is unavailable to phone.

## 2020-03-09 NOTE — TELEPHONE ENCOUNTER
[address] at [place]  [phone]        03/09/20 11:03 AM Attempted to call patient back via contact number provided. See result note.

## 2020-03-10 DIAGNOSIS — E87.6 HYPOKALEMIA: ICD-10-CM

## 2020-03-10 RX ORDER — POTASSIUM CHLORIDE 20 MEQ/1
20 TABLET, EXTENDED RELEASE ORAL 2 TIMES DAILY
Qty: 60 TAB | Refills: 0 | Status: SHIPPED | OUTPATIENT
Start: 2020-03-10 | End: 2020-05-14

## 2020-03-10 RX ORDER — LISINOPRIL 40 MG/1
40 TABLET ORAL DAILY
Qty: 30 TAB | Refills: 2 | Status: SHIPPED | OUTPATIENT
Start: 2020-03-10 | End: 2020-06-05 | Stop reason: SDUPTHER

## 2020-03-10 NOTE — PROGRESS NOTES
Patient returned call. Informed him, per Dr. Nitin Bennett, that his blood counts and kidney function look great. But, patient's potassium is low again. Patient states he is not taking potassium at this time as it was previously discontinued by Brennan Munoz. Patient states he is having occasional loose stools, but denies changes in his eating. Advised that patient should resume potassium 40 mEQ daily, as well as adding potassium rich foods to diet. Per Dr. Juan oJse Rubio, patient to also double his Lisinopril dose to 40 mg daily. Explained this can also increase potassium levels. Informed patient that Dr. Nitin Bennett has placed lab order for BMP (potassium) to be rechecked in 2 weeks. Patient verbalized understanding of above and states he will go to lab next door to office. He requests new prescription for both lisinopril and potassium as he is almost out. No further questions or concerns at this time.

## 2020-03-10 NOTE — TELEPHONE ENCOUNTER
[facility] [employer] [facility] [place]  [phone]        03/10/20 11:50 AM Refill of Lisinopril and potassium escribed to patient's pharmacy after provider review.

## 2020-03-19 ENCOUNTER — TELEPHONE (OUTPATIENT)
Dept: PALLATIVE CARE | Age: 45
End: 2020-03-19

## 2020-03-19 NOTE — TELEPHONE ENCOUNTER
[facility]  Palliative Medicine  Social Work  Telephone Call      Note:  Left message encouraging patient to set up MyChart in order to improve communication with the MD's office. Left the number for setting up MyChart.       Ekta Marcus, ENDY, MSSW, LCSW  Palliative   [facility] Palliative Medicine  [phone]

## 2020-03-24 DIAGNOSIS — E87.6 HYPOKALEMIA: ICD-10-CM

## 2020-03-25 ENCOUNTER — TELEPHONE (OUTPATIENT)
Dept: PALLATIVE CARE | Age: 45
End: 2020-03-25

## 2020-03-25 NOTE — TELEPHONE ENCOUNTER
Follow up call made to Mr Cori Cassidy with regards to the mychart activation. V/M left advised to please call PM back to discuss.

## 2020-03-31 RX ORDER — ATORVASTATIN CALCIUM 40 MG/1
40 TABLET, FILM COATED ORAL
Qty: 90 TAB | Refills: 3 | Status: SHIPPED | OUTPATIENT
Start: 2020-03-31

## 2020-04-03 ENCOUNTER — TELEPHONE (OUTPATIENT)
Dept: PALLATIVE CARE | Age: 45
End: 2020-04-03

## 2020-04-03 NOTE — TELEPHONE ENCOUNTER
Returned call to Mr Marcus Feliciano. He called to discuss the mychart. I gave him the number to call to set up the activation. I verified patient does have a SP. He gave verbal consent for a doxy-me visit. I explained the process with patient and any potential insurance fees. Patient is scheduled for follow up on 5/5/20 at 12:30 PM. Message forwarded to [facility]  to update in [place].

## 2020-04-06 ENCOUNTER — TELEPHONE (OUTPATIENT)
Dept: PALLATIVE CARE | Age: 45
End: 2020-04-06

## 2020-04-06 NOTE — TELEPHONE ENCOUNTER
Mr Corinna Mendez returned call. He states the pharmacy informed him he didn't have a Rx for the oxycodone Ir 5 mg tab. I reviewed chart Rx has been sent to the [facility]. I tried calling the Pharmacy unable to get anyone on the phone. I advised Mr Corinna Mendez to please try calling pharmacy back I too will keep trying to call. He verbalized understanding.

## 2020-04-07 RX ORDER — METOPROLOL SUCCINATE 50 MG/1
TABLET, EXTENDED RELEASE ORAL
Qty: 90 TAB | Refills: 1 | Status: SHIPPED | OUTPATIENT
Start: 2020-04-07 | End: 2021-01-05 | Stop reason: SDUPTHER

## 2020-04-16 ENCOUNTER — HOSPITAL ENCOUNTER (OUTPATIENT)
Dept: INFUSION THERAPY | Age: 45
Discharge: HOME | End: 2020-04-16
Payer: MEDICAID

## 2020-04-16 VITALS
OXYGEN SATURATION: 98 % | SYSTOLIC BLOOD PRESSURE: 105 MMHG | HEART RATE: 73 BPM | TEMPERATURE: 97.7 F | RESPIRATION RATE: 18 BRPM | DIASTOLIC BLOOD PRESSURE: 61 MMHG

## 2020-04-16 DIAGNOSIS — C82.90 FOLLICULAR LYMPHOMA, UNSPECIFIED FOLLICULAR LYMPHOMA TYPE, UNSPECIFIED BODY REGION (HCC): Primary | ICD-10-CM

## 2020-04-16 LAB
ALBUMIN SERPL-MCNC: 3.4 G/DL (ref 3.5–5)
ALBUMIN/GLOB SERPL: 1.2 {RATIO} (ref 1.1–2.2)
ALP SERPL-CCNC: 118 U/L (ref 45–117)
ALT SERPL-CCNC: 23 U/L (ref 12–78)
ANION GAP SERPL CALC-SCNC: 3 MMOL/L (ref 5–15)
AST SERPL-CCNC: 12 U/L (ref 15–37)
BASO+EOS+MONOS # BLD AUTO: 0.8 K/UL (ref 0.2–1.2)
BASO+EOS+MONOS NFR BLD AUTO: 12 % (ref 3.2–16.9)
BILIRUB SERPL-MCNC: 0.2 MG/DL (ref 0.2–1)
BUN SERPL-MCNC: 9 MG/DL (ref 6–20)
BUN/CREAT SERPL: 9 (ref 12–20)
CALCIUM SERPL-MCNC: 9.1 MG/DL (ref 8.5–10.1)
CHLORIDE SERPL-SCNC: 111 MMOL/L (ref 97–108)
CO2 SERPL-SCNC: 28 MMOL/L (ref 21–32)
CREAT SERPL-MCNC: 0.98 MG/DL (ref 0.7–1.3)
DIFFERENTIAL METHOD BLD: ABNORMAL
ERYTHROCYTE [DISTWIDTH] IN BLOOD BY AUTOMATED COUNT: 13.7 % (ref 11.8–15.8)
GLOBULIN SER CALC-MCNC: 2.8 G/DL (ref 2–4)
GLUCOSE SERPL-MCNC: 126 MG/DL (ref 65–100)
HCT VFR BLD AUTO: 35.9 % (ref 36.6–50.3)
HGB BLD-MCNC: 12.2 G/DL (ref 12.1–17)
LDH SERPL L TO P-CCNC: 138 U/L (ref 85–241)
LYMPHOCYTES # BLD: 1 K/UL (ref 0.8–3.5)
LYMPHOCYTES NFR BLD: 14 % (ref 12–49)
MCH RBC QN AUTO: 34.3 PG (ref 26–34)
MCHC RBC AUTO-ENTMCNC: 34 G/DL (ref 30–36.5)
MCV RBC AUTO: 100.8 FL (ref 80–99)
NEUTS SEG # BLD: 5 K/UL (ref 1.8–8)
NEUTS SEG NFR BLD: 75 % (ref 32–75)
PLATELET # BLD AUTO: 215 K/UL (ref 150–400)
POTASSIUM SERPL-SCNC: 3.8 MMOL/L (ref 3.5–5.1)
PROT SERPL-MCNC: 6.2 G/DL (ref 6.4–8.2)
RBC # BLD AUTO: 3.56 M/UL (ref 4.1–5.7)
SODIUM SERPL-SCNC: 142 MMOL/L (ref 136–145)
WBC # BLD AUTO: 6.8 K/UL (ref 4.1–11.1)

## 2020-04-16 PROCEDURE — 77030016057 HC NDL HUBR APOL -B

## 2020-04-16 PROCEDURE — 74011250636 HC RX REV CODE- 250/636: Performed by: INTERNAL MEDICINE

## 2020-04-16 PROCEDURE — 85025 COMPLETE CBC W/AUTO DIFF WBC: CPT

## 2020-04-16 PROCEDURE — 83615 LACTATE (LD) (LDH) ENZYME: CPT

## 2020-04-16 PROCEDURE — 80053 COMPREHEN METABOLIC PANEL: CPT

## 2020-04-16 PROCEDURE — 36591 DRAW BLOOD OFF VENOUS DEVICE: CPT

## 2020-04-16 RX ORDER — SODIUM CHLORIDE 0.9 % (FLUSH) 0.9 %
5-10 SYRINGE (ML) INJECTION AS NEEDED
Status: DISPENSED | OUTPATIENT
Start: 2020-04-16 | End: 2020-04-16

## 2020-04-16 RX ORDER — HEPARIN 100 UNIT/ML
500 SYRINGE INTRAVENOUS AS NEEDED
Status: ACTIVE | OUTPATIENT
Start: 2020-04-16 | End: 2020-04-16

## 2020-04-16 RX ORDER — SODIUM CHLORIDE 9 MG/ML
10 INJECTION INTRAMUSCULAR; INTRAVENOUS; SUBCUTANEOUS AS NEEDED
Status: ACTIVE | OUTPATIENT
Start: 2020-04-16 | End: 2020-04-16

## 2020-04-16 RX ADMIN — HEPARIN 500 UNITS: 100 SYRINGE at 11:12

## 2020-04-16 RX ADMIN — Medication 10 ML: at 11:12

## 2020-04-16 RX ADMIN — SODIUM CHLORIDE 10 ML: 9 INJECTION INTRAMUSCULAR; INTRAVENOUS; SUBCUTANEOUS at 11:11

## 2020-04-16 NOTE — PROGRESS NOTES
Attempted to call patient via home/cell number listed. No answer, left message requesting that patient return call regarding his lab results. Provided office phone number as well for patient to call back.

## 2020-04-16 NOTE — PROGRESS NOTES
[facility] Short Visit Progress Note    1100 Patient admitted to [facility] for Port flush and labs ambulatory in stable condition. Assessment completed. No new concerns voiced. Port accessed with positive blood. Labs drawn and sent for processing. Results pending in CC. Vital Signs:  Visit Vitals  /61 (BP 1 Location: Left arm, BP Patient Position: Sitting)   Pulse 73   Temp 97.7 °F (36.5 °C)   Resp 18   SpO2 98%     Medications:  Medications Administered     0.9% sodium chloride injection 10 mL     Admin Date  04/16/2020 Action  Given Dose  10 mL Route  IntraVENous Administered By  Mook Esteban RN          heparin (porcine) pf 500 Units     Admin Date  04/16/2020 Action  Given Dose  500 Units Route  IntraVENous Administered By  Mook Esteban RN          sodium chloride (NS) flush 5-10 mL     Admin Date  04/16/2020 Action  Given Dose  10 mL Route  IntraVENous Administered By  Mook Esteban RN              [id number] Patient tolerated treatment well. Port flushed with positive blood return, heparinized and joshi needle de-accessed per protocol. Patient discharged from [place] [address] ambulatory in no distress at 1115. Patient aware of next appointment.     Future Appointments   Date Time Provider Dahlia Epps   5/5/2020 12:30 PM Marcel Gallegos MD [facility]   5/28/2020 11:00 AM [place] [facility] CT [address] [facility]   5/28/2020 11:30 [facility]   6/4/2020  1:30 PM Cici Clark MD [facility] SCHED   7/9/2020 11:30 AM [facility]

## 2020-04-17 NOTE — PROGRESS NOTES
Attempted to call patient again via home/cell number listed. No answer, left message requesting that patient call back regarding his lab results. Provided office phone number as well.

## 2020-04-21 NOTE — PROGRESS NOTES
Patient returned call. Informed him, per Dr. Dylon Huerta, that his labs look good and that his potassium level is better. Advised that patient continue taking the same dose of potassium. He verbalized understanding of above. No further questions or concerns at this time.

## 2020-04-25 ENCOUNTER — TELEPHONE (OUTPATIENT)
Dept: ONCOLOGY | Age: 45
End: 2020-04-25

## 2020-04-25 DIAGNOSIS — Z85.72 HISTORY OF FOLLICULAR LYMPHOMA: Primary | ICD-10-CM

## 2020-04-25 RX ORDER — ONDANSETRON 4 MG/1
8 TABLET, FILM COATED ORAL
Qty: 30 TAB | Refills: 0 | Status: SHIPPED | OUTPATIENT
Start: 2020-04-25 | End: 2022-04-15

## 2020-04-25 NOTE — TELEPHONE ENCOUNTER
Pt called with nausea, vomiting, diarrhea for a few days. Will call in zofran and advised imodium for diarrhea. Advised to see Dr. Herrera Signs next week to outpatient eval as he is concerned his lymphoma has returned.  No fevers, cough, sob    Advised if symptoms worsen, go to ED

## 2020-04-27 ENCOUNTER — TELEPHONE (OUTPATIENT)
Dept: ONCOLOGY | Age: 45
End: 2020-04-27

## 2020-04-27 DIAGNOSIS — R11.2 NAUSEA AND VOMITING, INTRACTABILITY OF VOMITING NOT SPECIFIED, UNSPECIFIED VOMITING TYPE: Primary | ICD-10-CM

## 2020-04-27 RX ORDER — PROCHLORPERAZINE MALEATE 10 MG
10 TABLET ORAL
Qty: 16 TAB | Refills: 0 | Status: SHIPPED | OUTPATIENT
Start: 2020-04-27 | End: 2020-05-04
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
## 2020-04-28 ENCOUNTER — VIRTUAL VISIT (OUTPATIENT)
Dept: ONCOLOGY | Age: 45
End: 2020-04-28

## 2020-04-28 DIAGNOSIS — Z85.72 HISTORY OF FOLLICULAR LYMPHOMA: Primary | ICD-10-CM

## 2020-04-28 DIAGNOSIS — G89.29 CHRONIC LEFT-SIDED LOW BACK PAIN WITHOUT SCIATICA: ICD-10-CM

## 2020-04-28 DIAGNOSIS — H65.191 OTHER NON-RECURRENT ACUTE NONSUPPURATIVE OTITIS MEDIA OF RIGHT EAR: ICD-10-CM

## 2020-04-28 DIAGNOSIS — R11.2 NON-INTRACTABLE VOMITING WITH NAUSEA, UNSPECIFIED VOMITING TYPE: ICD-10-CM

## 2020-04-28 DIAGNOSIS — M54.50 CHRONIC LEFT-SIDED LOW BACK PAIN WITHOUT SCIATICA: ICD-10-CM

## 2020-04-28 RX ORDER — AMOXICILLIN AND CLAVULANATE POTASSIUM 875; 125 MG/1; MG/1
1 TABLET, FILM COATED ORAL EVERY 12 HOURS
Qty: 10 TAB | Refills: 0 | Status: SHIPPED | OUTPATIENT
Start: 2020-04-28 | End: 2020-09-17

## 2020-04-28 NOTE — PROGRESS NOTES
Ignacio Alexandre is a 45 y.o. male here for follow up of lymphoma. Patient with complaints of low back pain and pressure in right ear, rates as an 8 out of 10.

## 2020-04-30 ENCOUNTER — TELEPHONE (OUTPATIENT)
Dept: ONCOLOGY | Age: 45
End: 2020-04-30

## 2020-04-30 ENCOUNTER — DOCUMENTATION ONLY (OUTPATIENT)
Dept: PALLATIVE CARE | Age: 45
End: 2020-04-30

## 2020-04-30 NOTE — TELEPHONE ENCOUNTER
[address]  [phone]        04/30/20 12:45 PM Attempted to call patient to check on him. No answer via home/cell number listed, left message requesting that patient return call. Provided office phone number as well for patient to call back. 05/01/20 11:41 AM Called patient. He states he has continued nausea/vomiting. Has been following the BRAT diet per recommendations of Dr. Giovani Golden; however, does not note improvement in symptoms. Patient states that he feels nauseous upon waking and vomits multiple times. But, as he continues to [employer] day going,\" his symptoms subside and he no longer has nausea/vomiting that day. Patient still not eating breakfast, but states he generally does not eat breakfast. He is eating lunch and dinner. Advised this nurse would forward above update to Dr. Giovani Golden and call patient back with additional recommendations. He verbalized understanding. 2:25 PM Attempted to call patient via home/cell number listed. No answer, left message requesting patient return call. Provided office phone number as well.      2:31 PM Received return call. Discussed, per Dr. Giovani Golden, that it seems that patient's symptoms are somewhat improving. Patient still currently taking Augmentin. Denies any lightheadedness/dizziness. States he is having a small bowel movement every 3 days. Denies abdominal pain, fevers, and blood in stool. Advised that Dr. Giovani Golden will update Dr. Ray Ott regarding patient's symptoms. Patient has appointment 05/05 with Dr. Ray Ott. Will obtain additional update after that appointment and determine if any further recommendations at that time. Patient verbalized understanding and denies any further questions or concerns at this time.

## 2020-05-05 ENCOUNTER — VIRTUAL VISIT (OUTPATIENT)
Dept: PALLATIVE CARE | Age: 45
End: 2020-05-05

## 2020-05-05 VITALS — BODY MASS INDEX: 24.9 KG/M2 | HEIGHT: 67 IN

## 2020-05-05 DIAGNOSIS — C82.33 FOLLICULAR LYMPHOMA GRADE IIIA OF INTRA-ABDOMINAL LYMPH NODES (HCC): ICD-10-CM

## 2020-05-05 DIAGNOSIS — F41.9 ANXIETY: Primary | ICD-10-CM

## 2020-05-05 DIAGNOSIS — M54.50 CHRONIC LEFT-SIDED LOW BACK PAIN WITHOUT SCIATICA: ICD-10-CM

## 2020-05-05 DIAGNOSIS — R53.83 OTHER FATIGUE: ICD-10-CM

## 2020-05-05 DIAGNOSIS — G89.29 CHRONIC LEFT-SIDED LOW BACK PAIN WITHOUT SCIATICA: ICD-10-CM

## 2020-05-05 RX ORDER — OXYCODONE HYDROCHLORIDE 5 MG/1
5 TABLET ORAL
Qty: 80 TAB | Refills: 0 | Status: SHIPPED | OUTPATIENT
Start: 2020-06-04 | End: 2020-06-29 | Stop reason: SDUPTHER

## 2020-05-05 RX ORDER — OXYCODONE HYDROCHLORIDE 5 MG/1
5 TABLET ORAL
Qty: 80 TAB | Refills: 0 | Status: SHIPPED | OUTPATIENT
Start: 2020-07-04 | End: 2020-06-29 | Stop reason: SDUPTHER

## 2020-05-05 RX ORDER — ALPRAZOLAM 1 MG/1
0.5 TABLET ORAL 2 TIMES DAILY
Qty: 30 TAB | Refills: 0 | Status: SHIPPED | OUTPATIENT
Start: 2020-05-05 | End: 2020-06-04

## 2020-05-05 RX ORDER — ALPRAZOLAM 1 MG/1
0.5 TABLET ORAL DAILY
Qty: 15 TAB | Refills: 0 | Status: SHIPPED | OUTPATIENT
Start: 2020-06-04 | End: 2020-07-04

## 2020-05-05 RX ORDER — OXYCODONE HYDROCHLORIDE 5 MG/1
5 TABLET ORAL
Qty: 80 TAB | Refills: 0 | Status: SHIPPED | OUTPATIENT
Start: 2020-05-05 | End: 2020-06-04

## 2020-05-05 NOTE — PROGRESS NOTES
Palliative Medicine Outpatient Services  [place]: [phone] ([phone])    Patient Name: Naomi Jackson. YOB: 1975    Date of Current Visit: 05/05/20  Location of Current Visit:    [] [facility] Office  [] [facility] Office  [] [address] [place] [address]  [] Home  [x] Other:  televisit    Date of Initial Visit: 2/19/19   Referral from: Mesfin Ventura MD  Primary Care Physician: None      SUMMARY:   Naomi Laird is a 45y.o. year old with high-grade follicular lymphoma, who was referred to Palliative Medicine by Dr. Michael Chavis for symptom management and supportive care. He was diagnosed in 11/2018 after presenting with back pain. He is currently receiving systemic chemotherapy. He suffered cardiac arrest during cycle #3 due to previously undiagnosed severe stenosis of the LAD s/p PTCA and stent. He has since completed systemic chemotherapy. His most recent PET-CT (5/2019) showed no focal areas of increased hypermetabolic uptake. The patients social history includes: he is single. He lives in [place]. He has 3 teenage children who live with their mother and with whom he has close contact. 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 He's applied for disability. Palliative Medicine is addressing the following current patient/family concerns: anxiety, depression, left mid- and low back pain, poor appetite, fatigue, advanced care planning. Initial Referral Intake note from Mel Neri RN reviewed prior to visit   PALLIATIVE DIAGNOSES:       ICD-10-CM ICD-9-CM    1. Anxiety F41.9 300.00 ALPRAZolam (XANAX) 1 mg tablet      ALPRAZolam (XANAX) 1 mg tablet   2. Chronic left-sided low back pain without sciatica M54.5 724.2 oxyCODONE IR (ROXICODONE) 5 mg immediate release tablet    G89.29 338.29 oxyCODONE IR (ROXICODONE) 5 mg immediate release tablet      oxyCODONE IR (ROXICODONE) 5 mg immediate release tablet   3. Other fatigue R53.83 780.79    4.  Follicular lymphoma grade IIIa of intra-abdominal lymph nodes (HCC) C82.33 202.03 oxyCODONE IR (ROXICODONE) 5 mg immediate release tablet      oxyCODONE IR (ROXICODONE) 5 mg immediate release tablet      oxyCODONE IR (ROXICODONE) 5 mg immediate release tablet          PLAN:   Patient Instructions     Dear Heidi Mcnair. ,    It was a pleasure seeing you today in [facility]. We will see you again in 8 weeks    If labs or imaging tests have been ordered for you today, please call the office at [phone] 48 hours after completion to obtain the results. Your described symptoms were: Fatigue: 8 Drowsiness: 8   Depression: 0 Pain: 6   Anxiety: 10 Nausea: 0   Anorexia: 0 Dyspnea: 0   Best Well-Bein Constipation: No(last bm yesterday)   Other Problem (Comment): 0       This is the plan we talked about:      1. Anxiety  -Your anxiety has escalated over the course of your Xanax taper  -You met with our , Cherri Ace, for a few sessions   -Today we talked about your seeing a counselor at [facility] which provides counseling services for people with limited income  -You can call [phone] to get more information  -The [facility] crisis line is [phone]   -Continue escitalopram to 20-mg daily.  -Today I sent Xanax prescriptions for the next 2 months after which you will have completely tapered off:   -Xanax 1-mg: take 1/2 tab twice daily (#30) for  today   -Xanax 1-g: take 1/2 tab daily (#15( for  on     2. Back pain   -Continue oxycodone 5-mg every 4 hours as needed  -Today I sent three 1-month oxycodone prescriptions (#80) to your pharmacy for pick-up today,  and   -Continue tylenol as needed for moderate pain  -Continue heat or ice as needed  -Avoid taking ibuprofen, naprosyn or other any over-the-counter pain relievers which may interact with your blood thinner. 3. Fatigue  -You're feeling more tired today than usual  -You didn't sleep well last night    4.  Lymphoma  -You last scans showed no active disease. You have a scan scheduled for 5/28  -You saw Dr. Melissa Cardenas last week  -She sees you now every 3 months        This is what you have shared with us about Advance Care Planning:      Primary Decision Maker: [de-identified] - Ex-Spouse - [phone]    Secondary Decision Maker: Sherrill Mosquera - Other Relative - [phone]  [] Named in a scanned document   [x] Legal Next of Kin  [] Guardian    ACP documents you current have include:  [] Advance Directive or Living Will  [] Durable Do Not Resuscitate  [] Physician Orders for Scope of Treatment (POST)  [] Medical Power of   [] Other      The Palliative Medicine Team is here to support you and your family. Sincerely,      Miles Cavazos MD and the Palliative Medicine Team                Counseling and Coordination:        GOALS OF CARE / TREATMENT PREFERENCES:   [====Goals of Care====]  GOALS OF CARE:  Patient / health care proxy stated goals: See Patient Instructions / Summary    TREATMENT PREFERENCES:   Code Status:  [x] Attempt Resuscitation       [] Do Not Attempt Resuscitation    Advance Care Planning:  [x] The [facility] Interdisciplinary Team has updated the ACP Navigator with Decision Maker and Patient Capacity      Primary Decision Maker: [de-identified] - Ex-Spouse - [phone]    Secondary Decision Maker: Sherrill Mosquera - Other Relative - [phone]  [] Named in a scanned document   [x] Legal Next of Kin  [] Guardian    Other:  (If patient appropriate for POST, consider using PALLPOST smart phrase here)    The palliative care team has discussed with patient / health care proxy about goals of care / treatment preferences for patient.  [====Goals of Care====]     PRESCRIPTIONS GIVEN:     Medications Ordered Today   Medications    oxyCODONE IR (ROXICODONE) 5 mg immediate release tablet     Sig: Take 1 Tab by mouth every four (4) hours as needed for Pain for up to 30 days. Max Daily Amount: 30 mg.      Dispense:  80 Tab Refill:  0    oxyCODONE IR (ROXICODONE) 5 mg immediate release tablet     Sig: Take 1 Tab by mouth every four (4) hours as needed for Pain for up to 30 days. Max Daily Amount: 30 mg. Dispense:  80 Tab     Refill:  0    oxyCODONE IR (ROXICODONE) 5 mg immediate release tablet     Sig: Take 1 Tab by mouth every four (4) hours as needed for Pain for up to 30 days. Max Daily Amount: 30 mg. Dispense:  80 Tab     Refill:  0    ALPRAZolam (XANAX) 1 mg tablet     Sig: Take 0.5 Tabs by mouth two (2) times a day for 30 days. Max Daily Amount: 1 mg. Indications: anxious     Dispense:  30 Tab     Refill:  0    ALPRAZolam (XANAX) 1 mg tablet     Sig: Take 0.5 Tabs by mouth daily for 30 days. Max Daily Amount: 0.5 mg. Indications: anxious     Dispense:  15 Tab     Refill:  0           FOLLOW UP:     Future Appointments   Date Time Provider Dahlia Epps   5/28/2020 11:00 AM [facility] CT 1 [facility]MR[facility]   5/28/2020 11:30 AM SS INF7 CH3 <1H [facility]   6/4/2020  1:30 PM Chance Bright MD [facility] KATELYN SCHED   7/9/2020 11:30 AM SS INF7 CH3 <1H [facility]   8/20/2020 11:30 [facility]           PHYSICIANS INVOLVED IN CARE:   Patient Care Team:  None as PCP - Ramiro Gustafson MD (Hematology and Oncology)  Rogelio Patterson MD as Physician (Palliative Medicine)  Rogelio Patterson MD as Physician (Palliative Medicine)       HISTORY:   Reviewed patient-completed ESAS and advance care planning form. Reviewed patient record in prescription monitoring program.    CHIEF COMPLAINT:   Chief Complaint   Patient presents with    Anxiety       HPI/SUBJECTIVE:    The patient is: [x] Verbal / [] Nonverbal       His anxiety is \"gong crazy. \"  It's been worse since he stopped Xanax. He isn't doing anything for his nerves. He's still working ([employer]) 5 hours a day ( about 30 hours a week). His back pain is always there, worse on days when he works. Oxycodone helps. He was nauseous last week but that's better now. His appetite is good. He hasn't lost any weight. He's moving his bowels without any problems. He saw Dr. Pavan Davidson last week. He has a scan scheduled on . He still comes in for port flush and labs every month. Clinical Pain Assessment (nonverbal scale for nonverbal patients):   [++++ Clinical Pain Assessment++++]  [++++Pain Severity++++]: Pain: 6  [++++Pain Character++++]: stabbing pain in back  [++++Pain Duration++++]: months for back pain, weeks for groin pain  [++++Pain Effect++++]: little  [++++Pain Factors++++]: oxycodone helps with back pain, groin pain elicited by standing and walking  [++++Pain Frequency++++]: constant back pain with varying intensity  [++++Pain Location++++]: left lower back  [++++ Clinical Pain Assessment++++]       FUNCTIONAL ASSESSMENT:     Palliative Performance Scale (PPS):  PPS: 80       PSYCHOSOCIAL/SPIRITUAL SCREENING:     Any spiritual / Latter day concerns:  [] Yes /  [x] No    Caregiver Burnout:  [] Yes /  [x] No /  [] No Caregiver Present      Anticipatory grief assessment:   [x] Normal  / [] Maladaptive       ESAS Anxiety: Anxiety: 10    ESAS Depression: Depression: 0       REVIEW OF SYSTEMS:     The following systems were [x] reviewed / [] unable to be reviewed  Systems: constitutional, ears/nose/mouth/throat, respiratory, gastrointestinal, genitourinary, musculoskeletal, integumentary, neurologic, psychiatric, endocrine. Positive findings noted below. Modified ESAS Completed by: provider   Fatigue: 8 Drowsiness: 8   Depression: 0 Pain: 6   Anxiety: 10 Nausea: 0   Anorexia: 0 Dyspnea: 0   Best Well-Bein Constipation: No(last bm yesterday)   Other Problem (Comment): 0          PHYSICAL EXAM:     Wt Readings from Last 3 Encounters:   20 159 lb (72.1 kg)   20 156 lb 9.6 oz (71 kg)   20 161 lb 6.4 oz (73.2 kg)     Height 5' 7\" (1.702 m).   Last bowel movement: See Nursing Note    Constitutional    [] Appears well-developed and well-nourished in no apparent distress    [x] Abnormal: appears fatigued  Mental status  [x] Alert and awake  [x] Oriented to person/place/time  [x] Able to follow commands  [] Abnormal:   Eyes  [x] EOM normal   [x] Sclera normal   [x] No visible ocular discharge  [] Abnormal:   HENT  [x] Normocephalic, atraumatic  [x] Mouth/Throat: Moist mucous membranes   [x] External Ears normal  [] Abnormal:  Neck  [x] No visualized mass  [] Abnormal:  Pulmonary/Chest   [x] Respiratory effort normal  [x] No visualized signs of difficulty breathing or respiratory distress  [] Abnormal:  Musculoskeletal  [x] Normal gait with no signs of ataxia  [x] Normal range of motion of neck  [] Abnormal:  Neurological:   [x] No facial asymmetry (Cranial nerve 7 motor function)  [x] No gaze palsy  [] Abnormal:   Skin  [x] No significant exanthematous lesions or discoloration noted on facial skin  [] Abnormal:                                  Psychiatric  [x] Normal affect  [x] No hallucinations  [] Abnormal:    Other pertinent observable physical exam findings:    Due to this being a TeleHealth evaluation, many elements of the physical examination are unable to be assessed. HISTORY:     Past Medical History:   Diagnosis Date    Anxiety     Cancer [facility])     lymphoma Nov 2018 receiving chemo    Chronic pain     lower back- lymphoma    Hyperlipidemia     Hypertension     Lymphadenopathy 11/12/2018      Past Surgical History:   Procedure Laterality Date    HX HEART CATHETERIZATION  02/2019    HX OTHER SURGICAL  02/2019    cardiac stent    IR INJECTION PSEUDOANEURYSM  2/26/2019      Family History   Problem Relation Age of Onset    Hypertension Father     Diabetes Father     Diabetes Mother       History reviewed, no pertinent family history.   Social History     Tobacco Use    Smoking status: Current Every Day Smoker     Packs/day: 0.50     Years: 20.00     Pack years: 10.00    Smokeless tobacco: Never Used   Substance Use Topics    Alcohol use: No     Frequency: Never     No Known Allergies   Current Outpatient Medications   Medication Sig    oxyCODONE IR (ROXICODONE) 5 mg immediate release tablet Take 1 Tab by mouth every four (4) hours as needed for Pain for up to 30 days. Max Daily Amount: 30 mg.    [START ON 6/4/2020] oxyCODONE IR (ROXICODONE) 5 mg immediate release tablet Take 1 Tab by mouth every four (4) hours as needed for Pain for up to 30 days. Max Daily Amount: 30 mg.    [START ON 7/4/2020] oxyCODONE IR (ROXICODONE) 5 mg immediate release tablet Take 1 Tab by mouth every four (4) hours as needed for Pain for up to 30 days. Max Daily Amount: 30 mg.    ALPRAZolam (XANAX) 1 mg tablet Take 0.5 Tabs by mouth two (2) times a day for 30 days. Max Daily Amount: 1 mg. Indications: anxious    [START ON 6/4/2020] ALPRAZolam (XANAX) 1 mg tablet Take 0.5 Tabs by mouth daily for 30 days. Max Daily Amount: 0.5 mg. Indications: anxious    ondansetron hcl (ZOFRAN) 4 mg tablet Take 2 Tabs by mouth every eight (8) hours as needed for Nausea or Vomiting.  metoprolol succinate (TOPROL-XL) 50 mg XL tablet TAKE 1 TABLET BY MOUTH EVERY DAY    atorvastatin (LIPITOR) 40 mg tablet TAKE 1 TAB BY MOUTH NIGHTLY. INDICATIONS: TREATMENT TO SLOW PROGRESSION OF CORONARY ARTERY DISEASE    potassium chloride (K-DUR, KLOR-CON) 20 mEq tablet Take 1 Tab by mouth two (2) times a day.  lisinopriL (PRINIVIL, ZESTRIL) 40 mg tablet Take 1 Tab by mouth daily.  escitalopram oxalate (LEXAPRO) 20 mg tablet Take 1 Tab by mouth daily.  clopidogrel (PLAVIX) 75 mg tab 1 Tab by Per NG tube route daily.  amoxicillin-clavulanate (AUGMENTIN) 875-125 mg per tablet Take 1 Tab by mouth every twelve (12) hours.  naloxone (NARCAN) 4 mg/actuation nasal spray Use 1 spray intranasally, then discard. Repeat with new spray every 2 min as needed for opioid overdose symptoms, alternating nostrils.     CHANTIX 1 mg tablet TAKE 1 TABLET BY MOUTH TWICE A DAY    aspirin delayed-release (ASPIR-81) 81 mg tablet Take 1 Tab by mouth daily. No current facility-administered medications for this visit. LAB DATA REVIEWED:     Lab Results   Component Value Date/Time    WBC 6.8 04/16/2020 11:12 AM    HGB 12.2 04/16/2020 11:12 AM    PLATELET 898 26/31/9438 11:12 AM     Lab Results   Component Value Date/Time    Sodium 142 04/16/2020 11:12 AM    Potassium 3.8 04/16/2020 11:12 AM    Chloride 111 (H) 04/16/2020 11:12 AM    CO2 28 04/16/2020 11:12 AM    BUN 9 04/16/2020 11:12 AM    Creatinine 0.98 04/16/2020 11:12 AM    Calcium 9.1 04/16/2020 11:12 AM    Magnesium 1.7 01/12/2019 04:05 AM    Phosphorus 2.0 (L) 01/12/2019 04:05 AM      Lab Results   Component Value Date/Time    AST (SGOT) 12 (L) 04/16/2020 11:12 AM    Alk. phosphatase 118 (H) 04/16/2020 11:12 AM    Protein, total 6.2 (L) 04/16/2020 11:12 AM    Albumin 3.4 (L) 04/16/2020 11:12 AM    Globulin 2.8 04/16/2020 11:12 AM     Lab Results   Component Value Date/Time    INR 1.1 02/22/2019 08:18 PM    Prothrombin time 10.8 02/22/2019 08:18 PM    aPTT 27.8 02/22/2019 08:18 PM      No results found for: IRON, FE, TIBC, IBCT, PSAT, FERR       MRI lumbar spine 12/18/19:  Congenital narrowing of the lumbar canal. Vertebral body heights are maintained. Marrow signal is normal.     The conus medullaris terminates at T12/L1. Signal and caliber of the distal  spinal cord are within normal limits. There is no pathologic intrathecal  enhancement.     The paraspinal soft tissues are within normal limits.     Lower thoracic spine: No herniation or stenosis.     L1-L2: No herniation or stenosis.     L2-L3: No herniation or stenosis.     L3-L4: Mild facet arthropathy. Minimal central disc protrusion. Mild canal  stenosis. Foramina are patent     L4-L5: Desiccation. Mild facet arthropathy. Canal demonstrates minimal stenosis.   There is an annular ligament tear far laterally on the left. Mild left foraminal  stenosis.     L5-S1: Mild facet arthropathy. Canal and foramina are patent. IMPRESSION:  Mild disc degenerative change at L3-4 and L4-5.     Mild canal stenosis at L3-4 and mild left foraminal stenosis at L4-5.     Other less severe degenerative findings are as described above. CT chest/abdomen 12/16/19:  FINDINGS:      THYROID: No nodule. MEDIASTINUM: No mass or lymphadenopathy. Port catheter in place on the right. Catheter tip in appropriate position. SB: No mass or lymphadenopathy. THORACIC AORTA: No dissection or aneurysm. MAIN PULMONARY ARTERY: Unremarkable  TRACHEA/BRONCHI: Unremarkable  ESOPHAGUS: No wall thickening or dilatation. HEART: Normal in size. PLEURA: No effusion or pneumothorax. LUNGS: Bibasilar atelectasis is noted. Bula Dayhoff LIVER: No mass or biliary dilatation. GALLBLADDER: Layering contrast versus cholelithiasis. SPLEEN: No mass. PANCREAS: No mass or ductal dilatation. ADRENALS: The left adrenal gland is elevated by the retroperitoneal mass. The    right is unremarkable. KIDNEYS: There is diminished soft tissue density at the level of the left renal  hilum. There is increased left renal cortical atrophy. STOMACH: Unremarkable. SMALL BOWEL: No dilatation or wall thickening. COLON: No dilatation or wall thickening. APPENDIX: Unremarkable. PERITONEUM: No ascites or pneumoperitoneum. RETROPERITONEUM:   Diminished size of retroperitoneal mass. Diminished in size with margins difficult to fully ascertain. 2-62 35 x 50 mm previously 71 x 94 mm.     2-67 left periaortic adenopathy 25 x 17 mm  The SMA, celiac, and LIZZY are remain encased, diminished attenuation of these  vessels.      REPRODUCTIVE ORGANS: The prostate and seminal vesicles are unremarkable. URINARY  BLADDER: Unremarkable  BONES: No destructive bone lesion. ADDITIONAL COMMENTS: Resolved left inguinal pseudoaneurysm. Bula Dayhoff     RECIST        IMPRESSION: Baseline research examination. Findings are consistent with interval response to therapy.      Continued diminished size of retroperitoneal mass-adenopathy,  with diminished soft tissue density at the left renal hilum. Clinical Pain Assessment (nonverbal scale for nonver     CONTROLLED SUBSTANCES SAFETY ASSESSMENT (IF ON CONTROLLED SUBSTANCES):     Reviewed opioid safety handout:  [x] Yes   [] No  24 hour opioid dose >150mg morphine equivalent/day:  [] Yes   [x] No  Benzodiazepines:  [x] Yes   [] No  Sleep apnea:  [] Yes   [x] No  Urine Toxicology Testing within last 6 months:  [] Yes   [x] No  History of or new aberrant medication taking behaviors:  [] Yes   [x] No  Has Narcan been prescribed [x] Yes   [] No          Total time:   Counseling / coordination time:   > 50% counseling / coordination?:     Consent:  He and/or health care decision maker is aware that that he may receive a bill for this telehealth service, depending on his insurance coverage, and has provided verbal consent to proceed: Yes    CPT Codes 55010-44070 for Established Patients may apply to this Telehealth VisitTime-based coding, delete if not needed: I spent at least 25 minutes with this established patient, and >50% of the time was spent counseling and/or coordinating care regarding pain, anxiety; pharmacologic and non-pharmacologic interventions     Pursuant to the emergency declaration under the [address] and the [facility] waiver authority and the [employer] Act, this Virtual  Visit was conducted, with patient's consent, to reduce the patient's risk of exposure to COVID-19 and provide continuity of care for an established patient. Services were provided through a video synchronous discussion virtually to substitute for in-person clinic visit.

## 2020-05-05 NOTE — PATIENT INSTRUCTIONS
Dear Zackary Blackmon , 
 
It was a pleasure seeing you today in [facility]. We will see you again in 8 weeks If labs or imaging tests have been ordered for you today, please call the office at [phone] 48 hours after completion to obtain the results. Your described symptoms were: Fatigue: 8 Drowsiness: 8 Depression: 0 Pain: 6 Anxiety: 10 Nausea: 0 Anorexia: 0 Dyspnea: 0 Best Well-Bein Constipation: No(last bm yesterday) Other Problem (Comment): 0 This is the plan we talked about: 1. Anxiety 
-Your anxiety has escalated over the course of your Xanax taper 
-You met with our , Frederick Yeung, for a few sessions  
-Today we talked about your seeing a counselor at [address] [place] [address] which provides counseling services for people with limited income 
-You can call [phone] to get more information 
-The [facility] crisis line is [phone]  
-Continue escitalopram to 20-mg daily. 
-Today I sent Xanax prescriptions for the next 2 months after which you will have completely tapered off: 
 -Xanax 1-mg: take 1/2 tab twice daily (#30) for  today 
 -Xanax 1-g: take 1/2 tab daily (#15( for  on  
 
2. Back pain  
-Continue oxycodone 5-mg every 4 hours as needed 
-Today I sent three 1-month oxycodone prescriptions (#80) to your pharmacy for pick-up today,  and  
-Continue tylenol as needed for moderate pain 
-Continue heat or ice as needed 
-Avoid taking ibuprofen, naprosyn or other any over-the-counter pain relievers which may interact with your blood thinner. 3. Fatigue 
-You're feeling more tired today than usual 
-You didn't sleep well last night 4. Lymphoma 
-You last scans showed no active disease. You have a scan scheduled for  
-You saw Dr. Fausto Castro last week 
-She sees you now every 3 months This is what you have shared with us about Advance Care Planning: Primary Decision Maker: Chely Iverson - Ex-Spouse - [phone] Secondary Decision Maker: Sherrill Mosquera - Other Relative - [phone] [] Named in a scanned document  
[x] Legal Next of Kin 
[] Guardian ACP documents you current have include: 
[] Advance Directive or Living Will 
[] Durable Do Not Resuscitate 
[] Physician Orders for Scope of Treatment (POST) [] Medical Power of  
[] Other The Palliative Medicine Team is here to support you and your family. Sincerely, Jessica Barnhart MD and the Palliative Medicine Team

## 2020-05-07 ENCOUNTER — APPOINTMENT (OUTPATIENT)
Dept: GENERAL RADIOLOGY | Age: 45
End: 2020-05-07
Attending: PHYSICIAN ASSISTANT
Payer: MEDICAID

## 2020-05-07 ENCOUNTER — HOSPITAL ENCOUNTER (EMERGENCY)
Age: 45
Discharge: HOME | End: 2020-05-07
Attending: EMERGENCY MEDICINE
Payer: MEDICAID

## 2020-05-07 VITALS
BODY MASS INDEX: 24.48 KG/M2 | SYSTOLIC BLOOD PRESSURE: 119 MMHG | HEART RATE: 86 BPM | TEMPERATURE: 98 F | DIASTOLIC BLOOD PRESSURE: 75 MMHG | WEIGHT: 156 LBS | OXYGEN SATURATION: 97 % | RESPIRATION RATE: 20 BRPM | HEIGHT: 67 IN

## 2020-05-07 DIAGNOSIS — S90.31XA CONTUSION OF RIGHT FOOT, INITIAL ENCOUNTER: Primary | ICD-10-CM

## 2020-05-07 PROCEDURE — 73630 X-RAY EXAM OF FOOT: CPT

## 2020-05-07 PROCEDURE — 99282 EMERGENCY DEPT VISIT SF MDM: CPT

## 2020-05-07 NOTE — DISCHARGE INSTRUCTIONS
Patient Education        Contusion: Care Instructions  Your Care Instructions    Contusion is the medical term for a bruise. It is the result of a direct blow or an impact, such as a fall. Contusions are common sports injuries. Most people think of a bruise as a black-and-blue spot. This happens when small blood vessels get torn and leak blood under the skin. But bones, muscles, and organs can also get bruised. This may damage deep tissues but not cause a bruise you can see. The doctor will do a physical exam to find the location of your contusion. You may also have tests to make sure you do not have a more serious injury, such as a broken bone or nerve damage. These may include X-rays or other imaging tests like a CT scan or MRI. Deep-tissue contusions may cause pain and swelling. But if there is no serious damage, they will often get better in a few weeks with home treatment. The doctor has checked you carefully, but problems can develop later. If you notice any problems or new symptoms, get medical treatment right away. Follow-up care is a key part of your treatment and safety. Be sure to make and go to all appointments, and call your doctor if you are having problems. It's also a good idea to know your test results and keep a list of the medicines you take. How can you care for yourself at home? · Put ice or a cold pack on the sore area for 10 to 20 minutes at a time to stop swelling. Put a thin cloth between the ice pack and your skin. · Be safe with medicines. Read and follow all instructions on the label. ? If the doctor gave you a prescription medicine for pain, take it as prescribed. ? If you are not taking a prescription pain medicine, ask your doctor if you can take an over-the-counter medicine. · If you can, prop up the sore area on pillows as much as possible for the next few days. Try to keep the sore area above the level of your heart. When should you call for help?   Call your doctor now or seek immediate medical care if:    · Your pain gets worse.     · You have new or worse swelling.     · You have tingling, weakness, or numbness in the area near the contusion.     · The area near the contusion is cold or pale.    Watch closely for changes in your health, and be sure to contact your doctor if:    · You do not get better as expected. Where can you learn more? Go to http://manisha-anne marie.info/  Enter [id number] in the search box to learn more about \"Contusion: Care Instructions. \"  Current as of: June 26, 2019Content Version: 12.4  © 3831-6141 [employer]. Care instructions adapted under license by [employer] (which disclaims liability or warranty for this information). If you have questions about a medical condition or this instruction, always ask your healthcare professional. [place] [address] any warranty or liability for your use of this information. Patient Education        Bruises: Care Instructions  Your Care Instructions    Bruises occur when small blood vessels under the skin tear or rupture, most often from a twist, bump, or fall. Blood leaks into tissues under the skin and causes a black-and-blue spot that often turns colors, including purplish black, reddish blue, or yellowish green, as the bruise heals. Bruises hurt, but most are not serious and will go away on their own within 2 to 4 weeks. Sometimes, gravity causes them to spread down the body. A leg bruise usually will take longer to heal than a bruise on the face or arms. Follow-up care is a key part of your treatment and safety. Be sure to make and go to all appointments, and call your doctor if you are having problems. It's also a good idea to know your test results and keep a list of the medicines you take. How can you care for yourself at home? · Take pain medicines exactly as directed.   ? If the doctor gave you a prescription medicine for pain, take it as prescribed. ? If you are not taking a prescription pain medicine, ask your doctor if you can take an over-the-counter medicine. · Put ice or a cold pack on the area for 10 to 20 minutes at a time. Put a thin cloth between the ice and your skin. · If you can, prop up the bruised area on pillows as much as possible for the next few days. Try to keep the bruise above the level of your heart. When should you call for help? Call your doctor now or seek immediate medical care if:    · You have signs of infection, such as:  ? Increased pain, swelling, warmth, or redness. ? Red streaks leading from the bruise. ? Pus draining from the bruise. ? A fever.     · You have a bruise on your leg and signs of a blood clot, such as:  ? Increasing redness and swelling along with warmth, tenderness, and pain in the bruised area. ? Pain in your calf, back of the knee, thigh, or groin. ? Redness and swelling in your leg or groin.     · Your pain gets worse.    Watch closely for changes in your health, and be sure to contact your doctor if:    · You do not get better as expected. Where can you learn more? Go to http://manisha-anne marie.info/  Enter T177 in the search box to learn more about \"Bruises: Care Instructions. \"  Current as of: June 26, 2019Content Version: 12.4  © 6299-2268 [employer]. Care instructions adapted under license by [employer] (which disclaims liability or warranty for this information). If you have questions about a medical condition or this instruction, always ask your healthcare professional. [place] [address] any warranty or liability for your use of this information.

## 2020-05-07 NOTE — ED PROVIDER NOTES
39yo male with hx of chronic pain on daily oxycodone, non-hodgkins lymphoma presents with R foot pain after kicking a wooden end table this afternoon. Pain with weight-bearing. No other sx's. Past Medical History:   Diagnosis Date    Anxiety     Cancer [facility])     lymphoma Nov 2018 receiving chemo    Chronic pain     lower back- lymphoma    Hyperlipidemia     Hypertension     Lymphadenopathy 11/12/2018       Past Surgical History:   Procedure Laterality Date    HX HEART CATHETERIZATION  02/2019    HX OTHER SURGICAL  02/2019    cardiac stent    IR INJECTION PSEUDOANEURYSM  2/26/2019         Family History:   Problem Relation Age of Onset    Hypertension Father     Diabetes Father     Diabetes Mother        Social History     Socioeconomic History    Marital status:      Spouse name: Not on file    Number of children: 2    Years of education: 11    Highest education level: 11th grade   Occupational History     Comment: resturant manager,   Social Needs    Financial resource strain: Not very hard    Food insecurity     Worry: Never true     Inability: Never true    Transportation needs     Medical: No     Non-medical: No   Tobacco Use    Smoking status: Current Every Day Smoker     Packs/day: 0.50     Years: 20.00     Pack years: 10.00    Smokeless tobacco: Never Used   Substance and Sexual Activity    Alcohol use: No     Frequency: Never    Drug use: No    Sexual activity: Yes   Lifestyle    Physical activity     Days per week: 0 days     Minutes per session: 0 min    Stress: Only a little   Relationships    Social connections     Talks on phone: Once a week     Gets together: Once a week     Attends Congregation service: Never     Active member of club or organization: No     Attends meetings of clubs or organizations: Never     Relationship status:     Intimate partner violence     Fear of current or ex partner: No     Emotionally abused: No     Physically abused:  No Forced sexual activity: No   Other Topics Concern     Service No    Blood Transfusions No    Caffeine Concern Yes    Occupational Exposure No    Hobby Hazards No    Sleep Concern No    Stress Concern No    Weight Concern No    Special Diet No    Back Care No    Exercise No    Bike Helmet No    Seat Belt No    Self-Exams No   Social History Narrative    Not on file         ALLERGIES: Patient has no known allergies. Review of Systems   Constitutional: Negative. Negative for activity change, chills, fatigue and unexpected weight change. HENT: Negative for trouble swallowing. Respiratory: Negative for cough, chest tightness, shortness of breath and wheezing. Cardiovascular: Negative. Negative for chest pain and palpitations. Gastrointestinal: Negative. Negative for abdominal pain, diarrhea, nausea and vomiting. Genitourinary: Negative. Negative for dysuria, flank pain, frequency and hematuria. Musculoskeletal: Positive for arthralgias and gait problem (due to pain). Negative for back pain, neck pain and neck stiffness. Skin: Negative. Negative for color change and rash. Neurological: Negative for dizziness, numbness and headaches. All other systems reviewed and are negative. Vitals:    05/07/20 1602   BP: 119/75   Pulse: 86   Resp: 20   Temp: 98 °F (36.7 °C)   SpO2: 97%   Weight: 70.8 kg (156 lb)   Height: 5' 7\" (1.702 m)            Physical Exam  Vitals signs and nursing note reviewed. Constitutional:       Appearance: Normal appearance. Comments: Thin WM   Cardiovascular:      Rate and Rhythm: Normal rate. Pulmonary:      Effort: Pulmonary effort is normal.   Abdominal:      General: Abdomen is flat. Musculoskeletal: Normal range of motion. General: Tenderness present. No deformity. Comments: R foot- TTP along 1st MTP joint. Skin intact. Faint ecchymosis to base of 1st MTP. NVI throughout. capp refill brisk.    Neurological:      Mental Status: He is alert. MDM  Number of Diagnoses or Management Options  Contusion of right foot, initial encounter:   Diagnosis management comments:   Ddx: contusion, sprain       Amount and/or Complexity of Data Reviewed  Tests in the radiology section of CPT®: ordered and reviewed  Review and summarize past medical records: yes  Discuss the patient with other providers: yes    Patient Progress  Patient progress: stable         Procedures    Declined offer for post-op shoe, will accept offer for crutches. Alexandro Rodriguez, MARIANNE        DISCHARGE NOTE:  6:38 PM  The patient's results have been reviewed with them and/or available family. Patient and/or family verbally conveyed their understanding and agreement of the patient's signs, symptoms, diagnosis, treatment and prognosis and additionally agree to follow up as recommended in the discharge instructions or to return to the Emergency Room should their condition change prior to their follow-up appointment. The patient/family verbally agrees with the care-plan and verbally conveys that all of their questions have been answered. The discharge instructions have also been provided to the patient and/or family with some educational information regarding the patient's diagnosis as well a list of reasons why the patient would want to return to the ER prior to their follow-up appointment, should their condition change. Plan:  1. F/U with ortho  2. Can take Tylenol  3.  Return precautions discussed and advised to return to ER if worse

## 2020-05-08 ENCOUNTER — PATIENT OUTREACH (OUTPATIENT)
Dept: FAMILY MEDICINE CLINIC | Age: 45
End: 2020-05-08

## 2020-05-11 NOTE — PROGRESS NOTES
Patient resolved from Transition of Care episode on 5/11/20. ACM/CTN was unsuccessful at contacting this patient today. Patient/family was provided the following resources and education related to COVID-19 during the initial call:                         Signs, symptoms and red flags related to COVID-19            CDC exposure and quarantine guidelines            [facility]it exposure contact - [phone]            Contact for their local Department of Health                 Patient has not had any additional ED or hospital visits. No further outreach scheduled with this CTN/ACM. Episode of Care resolved. Patient has this CTN/ACM contact information if future needs arise.

## 2020-05-12 ENCOUNTER — TELEPHONE (OUTPATIENT)
Dept: PALLATIVE CARE | Age: 45
End: 2020-05-12

## 2020-05-12 NOTE — TELEPHONE ENCOUNTER
Palliative Medicine  Nursing Note  [phone]  Fax [phone]      Telephone Call  Patient Name: Gustavo Peterson. YOB: [birth year]        Primary Decision Maker: Cierra Schroeder - Ex-Spouse - [phone]   Advance Care Planning 4/16/2020   Patient's Healthcare Decision Maker is: Verbal statement (Legal Next of Kin remains as decision maker)   Confirm Advance Directive None   Patient Would Like to Complete Advance Directive No   Does the patient have other document types -     Call placed to Mr. Ender Atwood. He agreed to a follow up virtual appt with Dr. Ray Ott on 6/29/20 at 11:30. He stated that Mondays are best for him for virtual and/or clinic appts. Informed him that his after visit summary has been mailed to him. Reiterated to please call Palliative for any questions or concerns.     Fatoumata Wilkinson RN  Palliative Medicine

## 2020-05-14 DIAGNOSIS — E87.6 HYPOKALEMIA: ICD-10-CM

## 2020-05-14 RX ORDER — POTASSIUM CHLORIDE 1500 MG/1
TABLET, EXTENDED RELEASE ORAL
Qty: 60 TAB | Refills: 0 | Status: SHIPPED | OUTPATIENT
Start: 2020-05-14 | End: 2020-12-10 | Stop reason: SDUPTHER

## 2020-05-27 RX ORDER — SODIUM CHLORIDE 0.9 % (FLUSH) 0.9 %
5-10 SYRINGE (ML) INJECTION AS NEEDED
Status: CANCELLED | OUTPATIENT
Start: 2020-09-17

## 2020-05-27 RX ORDER — SODIUM CHLORIDE 9 MG/ML
10 INJECTION INTRAMUSCULAR; INTRAVENOUS; SUBCUTANEOUS AS NEEDED
Status: CANCELLED | OUTPATIENT
Start: 2020-09-17

## 2020-05-27 RX ORDER — HEPARIN 100 UNIT/ML
500 SYRINGE INTRAVENOUS AS NEEDED
Status: CANCELLED | OUTPATIENT
Start: 2020-09-17

## 2020-05-28 ENCOUNTER — HOSPITAL ENCOUNTER (OUTPATIENT)
Dept: CT IMAGING | Age: 45
Discharge: HOME | End: 2020-05-28
Attending: INTERNAL MEDICINE
Payer: MEDICAID

## 2020-05-28 ENCOUNTER — APPOINTMENT (OUTPATIENT)
Dept: INFUSION THERAPY | Age: 45
End: 2020-05-28

## 2020-05-28 DIAGNOSIS — Z85.72 HISTORY OF FOLLICULAR LYMPHOMA: ICD-10-CM

## 2020-05-28 PROCEDURE — 74011636320 HC RX REV CODE- 636/320: Performed by: RADIOLOGY

## 2020-05-28 PROCEDURE — 74177 CT ABD & PELVIS W/CONTRAST: CPT

## 2020-05-28 RX ADMIN — IOPAMIDOL 100 ML: 755 INJECTION, SOLUTION INTRAVENOUS at 11:31

## 2020-06-05 ENCOUNTER — TELEPHONE (OUTPATIENT)
Dept: PALLATIVE CARE | Age: 45
End: 2020-06-05

## 2020-06-05 DIAGNOSIS — I10 ESSENTIAL HYPERTENSION: Primary | ICD-10-CM

## 2020-06-05 RX ORDER — LISINOPRIL 40 MG/1
40 TABLET ORAL DAILY
Qty: 30 TAB | Refills: 0 | Status: SHIPPED | OUTPATIENT
Start: 2020-06-05 | End: 2020-09-14 | Stop reason: SDUPTHER

## 2020-06-05 NOTE — TELEPHONE ENCOUNTER
Returned call to patient to inform Rx has been escribed on 5/5/2020 for a fill saundra on 6/5/2020, call to pharmacy to confirm rx , Pharmacist reports her computers are down currently and can not confirm ,patient aware

## 2020-06-05 NOTE — TELEPHONE ENCOUNTER
PA has been expedited and faxed to [facility] with confirmation. I advised patient to call the pharmacy later, have reprocess Rx if he don't here from me prior to end of day.

## 2020-06-05 NOTE — TELEPHONE ENCOUNTER
Patient is calling to get a refill on his Oxycodone send to [facility] at [facility] and [address] [facility]. STates it is due today.

## 2020-06-05 NOTE — TELEPHONE ENCOUNTER
Returned called to patient and he stated that Oxycodone is requiring a pre-auth. Advised as soon we get information from pharmacy we will process.   Advised to allow 24 hours for response - but can check with pharmacy around 6 pm today on approval

## 2020-06-08 ENCOUNTER — DOCUMENTATION ONLY (OUTPATIENT)
Dept: PALLATIVE CARE | Age: 45
End: 2020-06-08

## 2020-06-25 ENCOUNTER — TELEPHONE (OUTPATIENT)
Dept: PALLATIVE CARE | Age: 45
End: 2020-06-25

## 2020-06-25 NOTE — TELEPHONE ENCOUNTER
Patient is calling asking to speak to nurse. States his anxiety is getting bad and he cannot cut back on his medication. Advised nurse would call him back.

## 2020-06-26 NOTE — TELEPHONE ENCOUNTER
[facility] Palliative Medicine Office  Nursing Note  [phone]  Fax [phone]     Name:  Ning Nation. YOB: 1975     Returned call to patient. He says \"I know Dr. Chaya Solomon wanted me to wean off of my anxiety medicine, but I wanted to let her know that haven't been able to do that right now. Between work, my health, and other things that are going on, I feel anxious and continue to need the Xanax. \"      Pt says he has an appt for a virtual visit with Dr. Chaya Solomon this Monday 6/29/20 and he is fine with waiting until then and discussing it with Dr. Chaya Solomon. Pt says he does not need to speak with Dr. Chaya Solomon today. Nurse provided active listening and advised pt he can call back at [phone] if he has questions or concerns.     Glynn Garcia, BSN, RN  Palliative Medicine  [phone]

## 2020-06-29 ENCOUNTER — VIRTUAL VISIT (OUTPATIENT)
Dept: PALLATIVE CARE | Age: 45
End: 2020-06-29

## 2020-06-29 DIAGNOSIS — R63.0 POOR APPETITE: ICD-10-CM

## 2020-06-29 DIAGNOSIS — C82.33 FOLLICULAR LYMPHOMA GRADE IIIA OF INTRA-ABDOMINAL LYMPH NODES (HCC): ICD-10-CM

## 2020-06-29 DIAGNOSIS — F41.9 ANXIETY: Primary | ICD-10-CM

## 2020-06-29 DIAGNOSIS — R53.83 OTHER FATIGUE: ICD-10-CM

## 2020-06-29 DIAGNOSIS — M54.50 CHRONIC LEFT-SIDED LOW BACK PAIN WITHOUT SCIATICA: ICD-10-CM

## 2020-06-29 DIAGNOSIS — G89.29 CHRONIC LEFT-SIDED LOW BACK PAIN WITHOUT SCIATICA: ICD-10-CM

## 2020-06-29 RX ORDER — OXYCODONE HYDROCHLORIDE 5 MG/1
5 TABLET ORAL
Qty: 80 TAB | Refills: 0 | Status: SHIPPED | OUTPATIENT
Start: 2020-08-03 | End: 2020-09-02

## 2020-06-29 NOTE — PROGRESS NOTES
Palliative Medicine Outpatient Services  [facility]: [phone] ([phone])    Patient Name: Emma Cohn YOB: 1975    Date of Current Visit: 06/29/20  Location of Current Visit:    [] [facility] Office  [] [facility] Office  [] [address] [place] [address]  [] Home  [x] Other:  televisit    Date of Initial Visit: 2/19/19   Referral from: Tiffanie Merlos MD  Primary Care Physician: None      SUMMARY:   Emma Cohn is a 45y.o. year old with high-grade follicular lymphoma, who was referred to Palliative Medicine by Dr. Sofía Geiger for symptom management and supportive care. He was diagnosed in 11/2018 after presenting with back pain. He is currently receiving systemic chemotherapy. He suffered cardiac arrest during cycle #3 due to previously undiagnosed severe stenosis of the LAD s/p PTCA and stent. He has since completed systemic chemotherapy. His most recent PET-CT (5/2019) showed no focal areas of increased hypermetabolic uptake. The patients social history includes: he is single. He lives in [place]. He has 3 teenage children who live with their mother and with whom he has close contact. 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 He's applied for disability. Palliative Medicine is addressing the following current patient/family concerns: anxiety, depression, left mid- and low back pain, poor appetite, fatigue, advanced care planning. Initial Referral Intake note from Chrissy Rosa RN reviewed prior to visit   PALLIATIVE DIAGNOSES:       ICD-10-CM ICD-9-CM    1. Anxiety F41.9 300.00    2. Chronic left-sided low back pain without sciatica M54.5 724.2 oxyCODONE IR (ROXICODONE) 5 mg immediate release tablet    G89.29 338.29    3. Other fatigue R53.83 780.79    4. Poor appetite R63.0 783.0    5.  Follicular lymphoma grade IIIa of intra-abdominal lymph nodes (HCC) C82.33 202.03 oxyCODONE IR (ROXICODONE) 5 mg immediate release tablet          PLAN:   Patient Instructions     Dear Emma Cohn ,    It was a pleasure seeing you today in [facility]. We will see you again in 8 weeks    If labs or imaging tests have been ordered for you today, please call the office at [phone] 48 hours after completion to obtain the results. Your described symptoms were: Fatigue: 3 Drowsiness: 2   Depression: 4 Pain: 7   Anxiety: 10 Nausea: 0   Anorexia: 0 Dyspnea: 0   Best Well-Bein Constipation: No(last bm this morning)   Other Problem (Comment): 0       This is the plan we talked about:      1. Anxiety  -Your anxiety has escalated over the course of your Xanax taper  -You met with our , Adebayo Israel, for a few sessions   -Today we talked about your seeing a counselor at [facility] [employer] which provides counseling services for people with limited income  -You can call [phone] to get more information  -The [facility] crisis line is [phone]   -Continue escitalopram to 20-mg daily.  -Today I sent Xanax prescriptions for the next 2 months after which you will have completely tapered off:   -Xanax 1-mg: take 1/2 tab twice daily (#30) for  today   -Xanax 1-g: take 1/2 tab daily (#15( for  on     2. Back pain   -Continue oxycodone 5-mg every 4 hours as needed  -Today I sent three 1-month oxycodone prescriptions (#80) to your pharmacy for pick-up today,  and   -Continue tylenol as needed for moderate pain  -Continue heat or ice as needed  -Avoid taking ibuprofen, naprosyn or other any over-the-counter pain relievers which may interact with your blood thinner. 3. Fatigue  -You're feeling more tired today than usual  -You didn't sleep well last night    4. Lymphoma  -You last scans showed no active disease.  You have a scan scheduled for   -You saw Dr. Guerda Lewis last week  -She sees you now every 3 months        This is what you have shared with us about Advance Care Planning:      Primary Decision Maker: [de-identified] - Ex-Spouse - [phone]  [] Named in a scanned document   [x] Legal Next of Kin  [] Guardian    ACP documents you current have include:  [] Advance Directive or Living Will  [] Durable Do Not Resuscitate  [] Physician Orders for Scope of Treatment (POST)  [] Medical Power of   [] Other      The Palliative Medicine Team is here to support you and your family. Sincerely,      Lupillo Luna MD and the Palliative Medicine Team          Dear Sivakumar Diaz. ,    It was a pleasure seeing you today in [facility]. We will see you again in 8 weeks    If labs or imaging tests have been ordered for you today, please call the office at [phone] 48 hours after completion to obtain the results. Your described symptoms were: Fatigue: 3 Drowsiness: 2   Depression: 4 Pain: 7   Anxiety: 10 Nausea: 0   Anorexia: 0 Dyspnea: 0   Best Well-Bein Constipation: No(last bm this morning)   Other Problem (Comment): 0       This is the plan we talked about:      1. Anxiety  -We talked today about your anxiety which has been a long term issue for you  -This is so much more troublesome since you started working  -You've now tapered off Xanax which isn't typically used for long-term management of anxiety  -Management of an anxiety disorder involves the combination use of medications like escitalopram, counseling and self-management techniques   -I included information about Anxiety Disorders below  -I recommend you call the [address] [place] [address]'S [facility]) to schedule an appointment with a counselor  -Their number is [phone]   -The [facility] crisis line is [phone]   -Continue escitalopram to 20-mg daily.       2. Back pain   -Continue oxycodone 5-mg every 4 hours as needed  -You have an oxycodone prescription at your pharmacy for  on   -Today I sent another 1-month oxycodone prescriptions (#80) to your pharmacy for pick-up on   -Continue tylenol as needed for moderate pain  -Continue heat or ice as needed  -Avoid taking ibuprofen, naprosyn or other any over-the-counter pain relievers which may interact with your blood thinner. 3. Fatigue  -This is chronic and unchanged    4. Poor appetite  -You're eating and you haven't lost any weight    5. Lymphoma  -Your CT scan on 5/28/2020 showed no evidence of recurrence of your lymphoma  -You see Dr. Jaxon Hill every 3 months        This is what you have shared with us about Advance Care Planning:      Primary Decision Maker: [de-identified] - Ex-Spouse - [phone]  [] Named in a scanned document   [x] Legal Next of Kin  [] Guardian    ACP documents you current have include:  [] Advance Directive or Living Will  [] Durable Do Not Resuscitate  [] Physician Orders for Scope of Treatment (POST)  [] Medical Power of   [] Other      The Palliative Medicine Team is here to support you and your family. Sincerely,      Milo Castañeda MD and the Palliative Medicine Team               Anxiety Disorder: Care Instructions  Your Care Instructions     Anxiety is a normal reaction to stress. Difficult situations can cause you to have symptoms such as sweaty palms and a nervous feeling. In an anxiety disorder, the symptoms are far more severe. Constant worry, muscle tension, trouble sleeping, nausea and diarrhea, and other symptoms can make normal daily activities difficult or impossible. These symptoms may occur for no reason, and they can affect your work, school, or social life. Medicines, counseling, and self-care can all help. Follow-up care is a key part of your treatment and safety. Be sure to make and go to all appointments, and call your doctor if you are having problems. It's also a good idea to know your test results and keep a list of the medicines you take. How can you care for yourself at home? · Take medicines exactly as directed. Call your doctor if you think you are having a problem with your medicine.   · Go to your counseling sessions and follow-up appointments. · Recognize and accept your anxiety. Then, when you are in a situation that makes you anxious, say to yourself, \"This is not an emergency. I feel uncomfortable, but I am not in danger. I can keep going even if I feel anxious. \"  · Be kind to your body:  ? Relieve tension with exercise or a massage. ? Get enough rest.  ? Avoid alcohol, caffeine, nicotine, and illegal drugs. They can increase your anxiety level and cause sleep problems. ? Learn and do relaxation techniques. See below for more about these techniques. · Engage your mind. Get out and do something you enjoy. Go to a [employer] movie, or take a walk or hike. Plan your day. Having too much or too little to do can make you anxious. · Keep a record of your symptoms. Discuss your fears with a good friend or family member, or join a support group for people with similar problems. Talking to others sometimes relieves stress. · Get involved in social groups, or volunteer to help others. Being alone sometimes makes things seem worse than they are. · Get at least 30 minutes of exercise on most days of the week to relieve stress. Walking is a good choice. You also may want to do other activities, such as running, swimming, cycling, or playing tennis or team sports. Relaxation techniques  Do relaxation exercises 10 to 20 minutes a day. You can play soothing, relaxing music while you do them, if you wish. · Tell others in your house that you are going to do your relaxation exercises. Ask them not to disturb you. · Find a comfortable place, away from all distractions and noise. · Lie down on your back, or sit with your back straight. · Focus on your breathing. Make it slow and steady. · Breathe in through your nose. Breathe out through either your nose or mouth. · Breathe deeply, filling up the area between your navel and your rib cage. Breathe so that your belly goes up and down. · Do not hold your breath.   · Breathe like this for 5 to 10 minutes. Notice the feeling of calmness throughout your whole body. As you continue to breathe slowly and deeply, relax by doing the following for another 5 to 10 minutes:  · Tighten and relax each muscle group in your body. You can begin at your toes and work your way up to your head. · Imagine your muscle groups relaxing and becoming heavy. · Empty your mind of all thoughts. · Let yourself relax more and more deeply. · Become aware of the state of calmness that surrounds you. · When your relaxation time is over, you can bring yourself back to alertness by moving your fingers and toes and then your hands and feet and then stretching and moving your entire body. Sometimes people fall asleep during relaxation, but they usually wake up shortly afterward. · Always give yourself time to return to full alertness before you drive a car or do anything that might cause an accident if you are not fully alert. Never play a relaxation tape while you drive a car. When should you call for help? ZZOD823 anytime you think you may need emergency care. For example, call if:  · You feel you cannot stop from hurting yourself or someone else. Keep the numbers for these national suicide hotlines: 7-075-494-TALK (3-147.169.6565) and 6-888-AATPMXR (7-056-311-236.171.7864). If you or someone you know talks about suicide or feeling hopeless, get help right away. Watch closely for changes in your health, and be sure to contact your doctor if:  · You have anxiety or fear that affects your life. · You have symptoms of anxiety that are new or different from those you had before. Where can you learn more? Go to http://manisha-anne marie.info/  Enter P754 in the search box to learn more about \"Anxiety Disorder: Care Instructions. \"  Current as of: January 31, 2020               Content Version: 12.5  © 7857-8079 Healthwise, Incorporated.    Care instructions adapted under license by [employer] (which disclaims liability or warranty for this information). If you have questions about a medical condition or this instruction, always ask your healthcare professional. [place] [address] any warranty or liability for your use of this information. Counseling and Coordination:        GOALS OF CARE / TREATMENT PREFERENCES:   [====Goals of Care====]  GOALS OF CARE:  Patient / health care proxy stated goals: See Patient Instructions / Summary    TREATMENT PREFERENCES:   Code Status:  [x] Attempt Resuscitation       [] Do Not Attempt Resuscitation    Advance Care Planning:  [x] The [facility] Interdisciplinary Team has updated the ACP Navigator with Decision Maker and Patient Capacity      Primary Decision Maker: [de-identified] - Ex-Spouse - [phone]  [] Named in a scanned document   [x] Legal Next of Kin  [] Guardian    Other:  (If patient appropriate for POST, consider using PALLPOST smart phrase here)    The palliative care team has discussed with patient / health care proxy about goals of care / treatment preferences for patient.  [====Goals of Care====]     PRESCRIPTIONS GIVEN:     Medications Ordered Today   Medications    oxyCODONE IR (ROXICODONE) 5 mg immediate release tablet     Sig: Take 1 Tab by mouth every four (4) hours as needed for Pain for up to 30 days. Max Daily Amount: 30 mg. Dispense:  80 Tab     Refill:  0           FOLLOW UP:     Future Appointments   Date Time Provider Dahlia Epps   7/16/2020  1:00 PM SS INF4 CH4 <1H [facility]   8/27/2020  1:00 PM SS INF4 CH4 <1H [facility]           PHYSICIANS INVOLVED IN CARE:   Patient Care Team:  None as PCP - Steve Liao MD (Hematology and Oncology)  Smita Mckeon MD as Physician (Palliative Medicine)  Smita Mckeon MD as Physician (Palliative Medicine)       HISTORY:   Reviewed patient-completed ESAS and advance care planning form.   Reviewed patient record in prescription monitoring program.    CHIEF COMPLAINT:   Chief Complaint   Patient presents with    Anxiety       HPI/SUBJECTIVE:    The patient is: [x] Verbal / [] Nonverbal       His anxiety is terrible. He gets angry and frustrated at work. He doesn't like to be around people and he has to be around people all the time at work. He just gets walks away when this happens. He feels anxious all the time. He hasn't had to miss any work. 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 He thought about calling the mental health agency but he didn't because he doesn't like to talk to people. His back pain is off and on, worse on days he works. He usually takes tylenol when he takes his oxycodone and that combination seems to work better. He hasn't been taking any other medications for pain. He isn't using any recreational drugs or alcohol. He's tired, nothing new. His appetite is good. He hasn't lost any weight. He's moving his bowels without any problems.       Clinical Pain Assessment (nonverbal scale for nonverbal patients):   [++++ Clinical Pain Assessment++++]  [++++Pain Severity++++]: Pain: 7  [++++Pain Character++++]: stabbing pain in back  [++++Pain Duration++++]: months for back pain, weeks for groin pain  [++++Pain Effect++++]: little  [++++Pain Factors++++]: oxycodone helps with back pain, groin pain elicited by standing and walking  [++++Pain Frequency++++]: constant back pain with varying intensity  [++++Pain Location++++]: left lower back  [++++ Clinical Pain Assessment++++]       FUNCTIONAL ASSESSMENT:     Palliative Performance Scale (PPS):  PPS: 80       PSYCHOSOCIAL/SPIRITUAL SCREENING:     Any spiritual / Orthodox concerns:  [] Yes /  [x] No    Caregiver Burnout:  [] Yes /  [x] No /  [] No Caregiver Present      Anticipatory grief assessment:   [x] Normal  / [] Maladaptive       ESAS Anxiety: Anxiety: 10    ESAS Depression: Depression: 4       REVIEW OF SYSTEMS: The following systems were [x] reviewed / [] unable to be reviewed  Systems: constitutional, ears/nose/mouth/throat, respiratory, gastrointestinal, genitourinary, musculoskeletal, integumentary, neurologic, psychiatric, endocrine. Positive findings noted below. Modified ESAS Completed by: provider   Fatigue: 3 Drowsiness: 2   Depression: 4 Pain: 7   Anxiety: 10 Nausea: 0   Anorexia: 0 Dyspnea: 0   Best Well-Bein Constipation: No(last bm this morning)   Other Problem (Comment): 0          PHYSICAL EXAM:     Wt Readings from Last 3 Encounters:   20 156 lb (70.8 kg)   20 159 lb (72.1 kg)   20 156 lb 9.6 oz (71 kg)     There were no vitals taken for this visit. Last bowel movement: See Nursing Note    Constitutional    [] Appears well-developed and well-nourished in no apparent distress    [x] Abnormal: appears fatigued  Mental status  [x] Alert and awake  [x] Oriented to person/place/time  [x] Able to follow commands  [] Abnormal:   Eyes  [x] EOM normal   [x] Sclera normal   [x] No visible ocular discharge  [] Abnormal:   HENT  [x] Normocephalic, atraumatic  [x] Mouth/Throat: Moist mucous membranes   [x] External Ears normal  [] Abnormal:  Neck  [x] No visualized mass  [] Abnormal:  Pulmonary/Chest   [x] Respiratory effort normal  [x] No visualized signs of difficulty breathing or respiratory distress  [] Abnormal:  Musculoskeletal  [x] Normal gait with no signs of ataxia  [x] Normal range of motion of neck  [] Abnormal:  Neurological:   [x] No facial asymmetry (Cranial nerve 7 motor function)  [x] No gaze palsy  [] Abnormal:   Skin  [x] No significant exanthematous lesions or discoloration noted on facial skin  [] Abnormal:                                  Psychiatric  [x] Normal affect  [x] No hallucinations  [] Abnormal:    Other pertinent observable physical exam findings:    Due to this being a TeleHealth evaluation, many elements of the physical examination are unable to be assessed. HISTORY:     Past Medical History:   Diagnosis Date    Anxiety     Cancer [facility])     lymphoma Nov 2018 receiving chemo    Chronic pain     lower back- lymphoma    Hyperlipidemia     Hypertension     Lymphadenopathy 11/12/2018      Past Surgical History:   Procedure Laterality Date    HX HEART CATHETERIZATION  02/2019    HX OTHER SURGICAL  02/2019    cardiac stent    IR INJECTION PSEUDOANEURYSM  2/26/2019      Family History   Problem Relation Age of Onset    Hypertension Father     Diabetes Father     Diabetes Mother       History reviewed, no pertinent family history. Social History     Tobacco Use    Smoking status: Current Every Day Smoker     Packs/day: 0.50     Years: 20.00     Pack years: 10.00    Smokeless tobacco: Never Used   Substance Use Topics    Alcohol use: No     Frequency: Never     No Known Allergies   Current Outpatient Medications   Medication Sig    [START ON 8/3/2020] oxyCODONE IR (ROXICODONE) 5 mg immediate release tablet Take 1 Tab by mouth every four (4) hours as needed for Pain for up to 30 days. Max Daily Amount: 30 mg.    lisinopriL (PRINIVIL, ZESTRIL) 40 mg tablet Take 1 Tab by mouth daily.  Klor-Con M20 20 mEq tablet TAKE 1 TABLET BY MOUTH TWICE A DAY    ALPRAZolam (XANAX) 1 mg tablet Take 0.5 Tabs by mouth daily for 30 days. Max Daily Amount: 0.5 mg. Indications: anxious    metoprolol succinate (TOPROL-XL) 50 mg XL tablet TAKE 1 TABLET BY MOUTH EVERY DAY    atorvastatin (LIPITOR) 40 mg tablet TAKE 1 TAB BY MOUTH NIGHTLY. INDICATIONS: TREATMENT TO SLOW PROGRESSION OF CORONARY ARTERY DISEASE    escitalopram oxalate (LEXAPRO) 20 mg tablet Take 1 Tab by mouth daily.  clopidogrel (PLAVIX) 75 mg tab 1 Tab by Per NG tube route daily.  aspirin delayed-release (ASPIR-81) 81 mg tablet Take 1 Tab by mouth daily.  amoxicillin-clavulanate (AUGMENTIN) 875-125 mg per tablet Take 1 Tab by mouth every twelve (12) hours.     ondansetron hcl (ZOFRAN) 4 mg tablet Take 2 Tabs by mouth every eight (8) hours as needed for Nausea or Vomiting.  naloxone (NARCAN) 4 mg/actuation nasal spray Use 1 spray intranasally, then discard. Repeat with new spray every 2 min as needed for opioid overdose symptoms, alternating nostrils.  CHANTIX 1 mg tablet TAKE 1 TABLET BY MOUTH TWICE A DAY     No current facility-administered medications for this visit. LAB DATA REVIEWED:     Lab Results   Component Value Date/Time    WBC 6.8 04/16/2020 11:12 AM    HGB 12.2 04/16/2020 11:12 AM    PLATELET 624 01/71/9374 11:12 AM     Lab Results   Component Value Date/Time    Sodium 142 04/16/2020 11:12 AM    Potassium 3.8 04/16/2020 11:12 AM    Chloride 111 (H) 04/16/2020 11:12 AM    CO2 28 04/16/2020 11:12 AM    BUN 9 04/16/2020 11:12 AM    Creatinine 0.98 04/16/2020 11:12 AM    Calcium 9.1 04/16/2020 11:12 AM    Magnesium 1.7 01/12/2019 04:05 AM    Phosphorus 2.0 (L) 01/12/2019 04:05 AM      Lab Results   Component Value Date/Time    Alk. phosphatase 118 (H) 04/16/2020 11:12 AM    Protein, total 6.2 (L) 04/16/2020 11:12 AM    Albumin 3.4 (L) 04/16/2020 11:12 AM    Globulin 2.8 04/16/2020 11:12 AM     Lab Results   Component Value Date/Time    INR 1.1 02/22/2019 08:18 PM    Prothrombin time 10.8 02/22/2019 08:18 PM    aPTT 27.8 02/22/2019 08:18 PM      No results found for: IRON, FE, TIBC, IBCT, PSAT, FERR       MRI lumbar spine 12/18/19:  Congenital narrowing of the lumbar canal. Vertebral body heights are maintained. Marrow signal is normal.     The conus medullaris terminates at T12/L1. Signal and caliber of the distal  spinal cord are within normal limits. There is no pathologic intrathecal  enhancement.     The paraspinal soft tissues are within normal limits.     Lower thoracic spine: No herniation or stenosis.     L1-L2: No herniation or stenosis.     L2-L3: No herniation or stenosis.     L3-L4: Mild facet arthropathy. Minimal central disc protrusion. Mild canal  stenosis.  [place] are patent     L4-L5: Desiccation. Mild facet arthropathy. Canal demonstrates minimal stenosis. There is an annular ligament tear far laterally on the left. Mild left foraminal  stenosis.     L5-S1: Mild facet arthropathy. Canal and foramina are patent. IMPRESSION:  Mild disc degenerative change at L3-4 and L4-5.     Mild canal stenosis at L3-4 and mild left foraminal stenosis at L4-5.     Other less severe degenerative findings are as described above. CT chest/abdomen 12/16/19:  FINDINGS:      THYROID: No nodule. MEDIASTINUM: No mass or lymphadenopathy. Port catheter in place on the right. Catheter tip in appropriate position. SB: No mass or lymphadenopathy. THORACIC AORTA: No dissection or aneurysm. MAIN PULMONARY ARTERY: Unremarkable  TRACHEA/BRONCHI: Unremarkable  ESOPHAGUS: No wall thickening or dilatation. HEART: Normal in size. PLEURA: No effusion or pneumothorax. LUNGS: Bibasilar atelectasis is noted. Ekta Dye LIVER: No mass or biliary dilatation. GALLBLADDER: Layering contrast versus cholelithiasis. SPLEEN: No mass. PANCREAS: No mass or ductal dilatation. ADRENALS: The left adrenal gland is elevated by the retroperitoneal mass. The    right is unremarkable. KIDNEYS: There is diminished soft tissue density at the level of the left renal  hilum. There is increased left renal cortical atrophy. STOMACH: Unremarkable. SMALL BOWEL: No dilatation or wall thickening. COLON: No dilatation or wall thickening. APPENDIX: Unremarkable. PERITONEUM: No ascites or pneumoperitoneum. RETROPERITONEUM:   Diminished size of retroperitoneal mass. Diminished in size with margins difficult to fully ascertain. 2-62 35 x 50 mm previously 71 x 94 mm.     2-67 left periaortic adenopathy 25 x 17 mm  The SMA, celiac, and LIZZY are remain encased, diminished attenuation of these  vessels.      REPRODUCTIVE ORGANS: The prostate and seminal vesicles are unremarkable.  URINARY  BLADDER: Unremarkable  BONES: No destructive bone lesion. ADDITIONAL COMMENTS: Resolved left inguinal pseudoaneurysm. Carlos  RECIST        IMPRESSION:    Baseline research examination. Findings are consistent with interval response to therapy.      Continued diminished size of retroperitoneal mass-adenopathy,  with diminished soft tissue density at the left renal hilum. Clinical Pain Assessment (nonverbal scale for nonver     CONTROLLED SUBSTANCES SAFETY ASSESSMENT (IF ON CONTROLLED SUBSTANCES):     Reviewed opioid safety handout:  [x] Yes   [] No  24 hour opioid dose >150mg morphine equivalent/day:  [] Yes   [x] No  Benzodiazepines:  [x] Yes   [] No  Sleep apnea:  [] Yes   [x] No  Urine Toxicology Testing within last 6 months:  [] Yes   [x] No  History of or new aberrant medication taking behaviors:  [] Yes   [x] No  Has Narcan been prescribed [x] Yes   [] No          Total time:   Counseling / coordination time:   > 50% counseling / coordination?:     Consent:  He and/or health care decision maker is aware that that he may receive a bill for this telehealth service, depending on his insurance coverage, and has provided verbal consent to proceed: Yes    CPT Codes 55699-31513 for Established Patients may apply to this Telehealth VisitTime-based coding, delete if not needed: I spent at least 25 minutes with this established patient, and >50% of the time was spent counseling and/or coordinating care regarding pain, anxiety; pharmacologic and non-pharmacologic interventions     Pursuant to the emergency declaration under the [address] and the [facility], 113 waiver authority and the [employer] General Act, this Virtual  Visit was conducted, with patient's consent, to reduce the patient's risk of exposure to COVID-19 and provide continuity of care for an established patient.    Services were provided through a video synchronous discussion virtually to substitute for in-person clinic visit.

## 2020-06-29 NOTE — PATIENT INSTRUCTIONS
Dear Sivakumar Diaz. , 
 
It was a pleasure seeing you today in [facility]. We will see you again in 8 weeks If labs or imaging tests have been ordered for you today, please call the office at [phone] 48 hours after completion to obtain the results. Your described symptoms were: Fatigue: 3 Drowsiness: 2 Depression: 4 Pain: 7 Anxiety: 10 Nausea: 0 Anorexia: 0 Dyspnea: 0 Best Well-Bein Constipation: No(last bm this morning) Other Problem (Comment): 0 This is the plan we talked about: 1. Anxiety 
-Your anxiety has escalated over the course of your Xanax taper 
-You met with our , Christina Escobarch, for a few sessions  
-Today we talked about your seeing a counselor at [facility] which provides counseling services for people with limited income 
-You can call [phone] to get more information 
-The [facility] crisis line is [phone]  
-Continue escitalopram to 20-mg daily. 
-Today I sent Xanax prescriptions for the next 2 months after which you will have completely tapered off: 
 -Xanax 1-mg: take 1/2 tab twice daily (#30) for  today 
 -Xanax 1-g: take 1/2 tab daily (#15( for  on  
 
2. Back pain  
-Continue oxycodone 5-mg every 4 hours as needed 
-Today I sent three 1-month oxycodone prescriptions (#80) to your pharmacy for pick-up today,  and  
-Continue tylenol as needed for moderate pain 
-Continue heat or ice as needed 
-Avoid taking ibuprofen, naprosyn or other any over-the-counter pain relievers which may interact with your blood thinner. 3. Fatigue 
-You're feeling more tired today than usual 
-You didn't sleep well last night 4. Lymphoma 
-You last scans showed no active disease. You have a scan scheduled for  
-You saw Dr. Migel Wetzel last week 
-She sees you now every 3 months This is what you have shared with us about Advance Care Planning: Primary Decision Maker: Chely Iverson - Ex-Spouse - [phone] [] Named in a scanned document  
[x] Legal Next of Kin 
[] Guardian ACP documents you current have include: 
[] Advance Directive or Living Will 
[] Durable Do Not Resuscitate 
[] Physician Orders for Scope of Treatment (POST) [] Medical Power of  
[] Other The Palliative Medicine Team is here to support you and your family. Sincerely, Diandra Zuniga MD and the Palliative Medicine Team 
  
 
  
Dear Sanjay Alvarado. , 
 
It was a pleasure seeing you today in [facility]. We will see you again in 8 weeks If labs or imaging tests have been ordered for you today, please call the office at [phone] 48 hours after completion to obtain the results. Your described symptoms were: Fatigue: 3 Drowsiness: 2 Depression: 4 Pain: 7 Anxiety: 10 Nausea: 0 Anorexia: 0 Dyspnea: 0 Best Well-Bein Constipation: No(last bm this morning) Other Problem (Comment): 0 This is the plan we talked about: 1. Anxiety 
-We talked today about your anxiety which has been a long term issue for you 
-This is so much more troublesome since you started working 
-You've now tapered off Xanax which isn't typically used for long-term management of anxiety 
-Management of an anxiety disorder involves the combination use of medications like escitalopram, counseling and self-management techniques  
-I included information about Anxiety Disorders below 
-I recommend you call the [address] [place] [address]'[facility]) to schedule an appointment with a counselor 
-Their number is [phone]  
-The [facility] crisis line is [phone]  
-Continue escitalopram to 20-mg daily.  
 
 
2. Back pain  
-Continue oxycodone 5-mg every 4 hours as needed 
-You have an oxycodone prescription at your pharmacy for  on  
-Today I sent another 1-month oxycodone prescriptions (#80) to your pharmacy for pick-up on 8/4 
-Continue tylenol as needed for moderate pain 
-Continue heat or ice as needed 
-Avoid taking ibuprofen, naprosyn or other any over-the-counter pain relievers which may interact with your blood thinner. 3. Fatigue 
-This is chronic and unchanged 4. Poor appetite 
-You're eating and you haven't lost any weight 5. Lymphoma 
-Your CT scan on 5/28/2020 showed no evidence of recurrence of your lymphoma 
-You see Dr. Yaneth Valdivia every 3 months This is what you have shared with us about Advance Care Planning: 
 
  Primary Decision Maker: [de-identified] - Ex-Spouse - [phone] [] Named in a scanned document  
[x] Legal Next of Kin 
[] Guardian ACP documents you current have include: 
[] Advance Directive or Living Will 
[] Durable Do Not Resuscitate 
[] Physician Orders for Scope of Treatment (POST) [] Medical Power of  
[] Other The Palliative Medicine Team is here to support you and your family. Sincerely, Rogelio Patterson MD and the Palliative Medicine Team 
  
 
  
 
  

## 2020-07-09 ENCOUNTER — APPOINTMENT (OUTPATIENT)
Dept: INFUSION THERAPY | Age: 45
End: 2020-07-09

## 2020-07-16 ENCOUNTER — TELEPHONE (OUTPATIENT)
Dept: ONCOLOGY | Age: 45
End: 2020-07-16

## 2020-07-16 NOTE — TELEPHONE ENCOUNTER
[address] at [place]  [phone]        07/16/20 3:26 PM Spoke with patient regarding his missed [facility] and MD appointments for today. Patient states he arrived to building and had his temperature checked. Patient states his temperature was 99.5 and that he was advised he could not be seen today. Patient denies having fevers at home. States his cough is at baseline, no complaints of increased SOB or sore throat. Patient states he did start feeling hoarse last night and feels as if he is losing his voice. He also notes that he has been \"blowing his nose more,\" but denies increased drainage/congestion. Patient then went to work and had his temperature rechecked upon arriving there. Per patient, his repeat temperature was \"97 point something. \" Advised this nurse would forward above to Dr. Alicia Arellano and Randolph Watson and call patient back with further recommendations on when to reschedule. Patient verbalized understanding.  Bonita Fabian #[phone]

## 2020-07-31 ENCOUNTER — TELEPHONE (OUTPATIENT)
Dept: PALLATIVE CARE | Age: 45
End: 2020-07-31

## 2020-07-31 NOTE — TELEPHONE ENCOUNTER
Returned call to Mr Herminio Quezada after reviewing MD notes from Lauren 6/29/20. Patient states he didn't receive copy of the AVS. He wants to know the the providers who can help him with the anxiety. I informed patient I will mail the AVS to him. He was appreciative.

## 2020-08-05 RX ORDER — ESCITALOPRAM OXALATE 20 MG/1
TABLET ORAL
Qty: 30 TAB | Refills: 5 | Status: SHIPPED | OUTPATIENT
Start: 2020-08-05 | End: 2021-02-11

## 2020-08-05 NOTE — TELEPHONE ENCOUNTER
Patient made aware Rx for the lexapro tab has been sent with 5 refills. Also there is a Rx on file for the oxycodone ir 5 mg tab. Patient states he called the pharmacy yesterday the quantity wasn't available. I called the Pharmacy the Pharmacist states there is a shipment coming in today within 1 hr for the oxycodone Ir 5 mg tab.

## 2020-08-11 ENCOUNTER — HOSPITAL ENCOUNTER (EMERGENCY)
Age: 45
Discharge: HOME | End: 2020-08-11
Attending: STUDENT IN AN ORGANIZED HEALTH CARE EDUCATION/TRAINING PROGRAM | Admitting: STUDENT IN AN ORGANIZED HEALTH CARE EDUCATION/TRAINING PROGRAM
Payer: MEDICAID

## 2020-08-11 VITALS
HEIGHT: 67 IN | OXYGEN SATURATION: 98 % | TEMPERATURE: 97.9 F | BODY MASS INDEX: 25.11 KG/M2 | SYSTOLIC BLOOD PRESSURE: 114 MMHG | WEIGHT: 160 LBS | DIASTOLIC BLOOD PRESSURE: 70 MMHG | RESPIRATION RATE: 18 BRPM | HEART RATE: 64 BPM

## 2020-08-11 DIAGNOSIS — B86 SCABIES: ICD-10-CM

## 2020-08-11 DIAGNOSIS — R21 RASH AND OTHER NONSPECIFIC SKIN ERUPTION: Primary | ICD-10-CM

## 2020-08-11 DIAGNOSIS — Z72.0 TOBACCO USE: ICD-10-CM

## 2020-08-11 DIAGNOSIS — L29.9 PRURITUS: ICD-10-CM

## 2020-08-11 DIAGNOSIS — R21 ACUTE PAPULAR ERUPTION OF SKIN: ICD-10-CM

## 2020-08-11 PROCEDURE — 99281 EMR DPT VST MAYX REQ PHY/QHP: CPT

## 2020-08-11 RX ORDER — PERMETHRIN 50 MG/G
CREAM TOPICAL
Qty: 60 G | Refills: 1 | Status: SHIPPED | OUTPATIENT
Start: 2020-08-11 | End: 2020-09-10

## 2020-08-11 NOTE — ED PROVIDER NOTES
57-year-old male history of anxiety, high cholesterol, hypertension, lymphoma in remission presenting to the ED for skin problem. Patient reports that he and his girlfriend were recently around somebody who had either scabies or bedbugs, she is being seen today as well. Patient and girlfriend at the [place] have checked where they live and they have not seen any signs of bedbugs, report pruritus to the back, legs it seems to be worse at night. No treatment prior to arrival.  No new medications or contact exposures. No other concerns. Past medical history: As above           Past Medical History:   Diagnosis Date    Anxiety     Cancer [facility])     lymphoma Nov 2018 receiving chemo    Chronic pain     lower back- lymphoma    Hyperlipidemia     Hypertension     Lymphadenopathy 11/12/2018       Past Surgical History:   Procedure Laterality Date    HX HEART CATHETERIZATION  02/2019    HX OTHER SURGICAL  02/2019    cardiac stent    IR INJECTION PSEUDOANEURYSM  2/26/2019         Family History:   Problem Relation Age of Onset    Hypertension Father     Diabetes Father     Diabetes Mother        Social History     Socioeconomic History    Marital status:      Spouse name: Not on file    Number of children: 2    Years of education: 11    Highest education level: 11th grade   Occupational History     Comment: resturant manager,   Social Needs    Financial resource strain: Not very hard    Food insecurity     Worry: Never true     Inability: Never true    Transportation needs     Medical: No     Non-medical: No   Tobacco Use    Smoking status: Current Every Day Smoker     Packs/day: 0.50     Years: 20.00     Pack years: 10.00    Smokeless tobacco: Never Used   Substance and Sexual Activity    Alcohol use: No     Frequency: Never    Drug use: No    Sexual activity: Yes   Lifestyle    Physical activity     Days per week: 0 days     Minutes per session: 0 min    Stress:  Only a little Relationships    Social connections     Talks on phone: Once a week     Gets together: Once a week     Attends Jain service: Never     Active member of club or organization: No     Attends meetings of clubs or organizations: Never     Relationship status:     Intimate partner violence     Fear of current or ex partner: No     Emotionally abused: No     Physically abused: No     Forced sexual activity: No   Other Topics Concern     Service No    Blood Transfusions No    Caffeine Concern Yes    Occupational Exposure No    Hobby Hazards No    Sleep Concern No    Stress Concern No    Weight Concern No    Special Diet No    Back Care No    Exercise No    Bike Helmet No    Seat Belt No    Self-Exams No   Social History Narrative    Not on file         ALLERGIES: Patient has no known allergies. Review of Systems   Constitutional: Negative for fever. HENT: Negative for facial swelling. Respiratory: Negative for shortness of breath. Cardiovascular: Negative for chest pain. Gastrointestinal: Negative for vomiting. Skin: Positive for rash. Negative for wound. Neurological: Negative for syncope. All other systems reviewed and are negative. Vitals:    08/11/20 1300   BP: 114/70   Pulse: 64   Resp: 18   Temp: 97.9 °F (36.6 °C)   SpO2: 98%   Weight: 72.6 kg (160 lb)   Height: 5' 7\" (1.702 m)            Physical Exam  Vitals signs and nursing note reviewed. Constitutional:       Appearance: He is well-developed. Comments: Pleasant white male   HENT:      Head: Normocephalic. Eyes:      Conjunctiva/sclera: Conjunctivae normal.   Neck:      Musculoskeletal: Neck supple. Cardiovascular:      Rate and Rhythm: Normal rate. Pulmonary:      Effort: Pulmonary effort is normal. No respiratory distress. Musculoskeletal: Normal range of motion. Skin:     General: Skin is warm and dry.       Comments: Few erythematous papules with some excoriation noted to the back Neurological:      Mental Status: He is alert and oriented to person, place, and time. MDM  Number of Diagnoses or Management Options  Rash and other nonspecific skin eruption:   Diagnosis management comments: 53-year-old male presenting to the ER for skin pruritus, mild rash after exposure to someone with either bedbugs or scabies. Patient notes that did not see any evidence of bedbugs where they live, given that pruritus is worse at night, reports that the person who may think he contracted this from had significant involvement in between his fingers, suspect that scabies is more likely. Will treat with permethrin, encourage patient to check his residence for bedbugs and explained that that would need additional treatment.        Amount and/or Complexity of Data Reviewed  Discuss the patient with other providers: yes (Dr. Julienne Richmond ED attending)           Procedures

## 2020-08-11 NOTE — DISCHARGE INSTRUCTIONS
Patient Education        Scabies: Care Instructions  Your Care Instructions  Scabies is a skin problem that can cause intense itching. It is caused by very tiny bugs called mites that dig just under the skin and lay eggs. An allergic reaction to the mites causes the itching. Scabies is usually spread by person-to-person contact. It is also possible, but not common, for scabies to spread through towels, clothes, and bedding. Everyone in your household should be treated. Scabies is treated with medicine. Itching may last for several weeks after treatment. Follow-up care is a key part of your treatment and safety. Be sure to make and go to all appointments, and call your doctor if you are having problems. It's also a good idea to know your test results and keep a list of the medicines you take. How can you care for yourself at home? · Use the lotion or cream your doctor recommends or prescribes. One treatment usually cures scabies. Do not use the cream again unless your doctor tells you to. · Wash all clothes, bedding, and towels that you used in the 3 days before you started treatment. Use hot water, and use the hot cycle in the dryer. Another option is to dry-clean these items. Or seal them in a plastic bag for 3 to 7 days. · Take an oral antihistamine, such as loratadine (Claritin) or diphenhydramine (Benadryl), to help stop itching. You also can use a nonprescription anti-itch cream. Read and follow all instructions on the label. · Do not have physical contact with other people or let anyone use your personal items until you have finished treatment. Do not use other people's personal items until your treatment is done. Tell people with whom you have close contact that they will need treatment if they have symptoms. · Take an oatmeal bath to help relieve itching. Add a handful of oatmeal (ground to a powder) to your bath. Or you can try an oatmeal bath product, such as Aveeno.   When should you call for help?   Call your doctor now or seek immediate medical care if:  · You have signs of infection, such as:  ? Increased pain, swelling, warmth, or redness. ? Red streaks leading from the mite bites. ? Pus draining from a bite area. ? A fever. Watch closely for changes in your health, and be sure to contact your doctor if:  · Anyone else in your family has itching. · You do not get better within 2 weeks. Where can you learn more? Go to http://manisha-anne marie.info/  Enter S480 in the search box to learn more about \"Scabies: Care Instructions. \"  Current as of: October 31, 2019               Content Version: 12.5  © 0849-1871 [employer]. Care instructions adapted under license by [employer] (which disclaims liability or warranty for this information). If you have questions about a medical condition or this instruction, always ask your healthcare professional. [address] any warranty or liability for your use of this information.

## 2020-08-11 NOTE — ED TRIAGE NOTES
Pt reports itching all over x 3 days mostly on his back and posterior legs. Pt states his itching is worse at night. Pt states he has a friend who has scabies. Denies any other complaints.

## 2020-08-20 ENCOUNTER — APPOINTMENT (OUTPATIENT)
Dept: INFUSION THERAPY | Age: 45
End: 2020-08-20

## 2020-09-08 ENCOUNTER — TELEPHONE (OUTPATIENT)
Dept: PALLATIVE CARE | Age: 45
End: 2020-09-08

## 2020-09-08 DIAGNOSIS — G89.29 CHRONIC LEFT-SIDED LOW BACK PAIN WITHOUT SCIATICA: Primary | ICD-10-CM

## 2020-09-08 DIAGNOSIS — M54.50 CHRONIC LEFT-SIDED LOW BACK PAIN WITHOUT SCIATICA: Primary | ICD-10-CM

## 2020-09-08 RX ORDER — OXYCODONE HYDROCHLORIDE 5 MG/1
5 TABLET ORAL
Qty: 80 TAB | Refills: 0 | Status: SHIPPED | OUTPATIENT
Start: 2020-09-08 | End: 2020-10-05 | Stop reason: SDUPTHER

## 2020-09-08 NOTE — TELEPHONE ENCOUNTER
Patient is calling for 4th time asking for his script. Advised that nurse would call him back to discuss once she was finished with clinic.

## 2020-09-08 NOTE — TELEPHONE ENCOUNTER
Triage for Controlled Substance Refill Request    Pain Diagnosis: left sided low back pain    Last Outpatient Visit: 6/29/20    Next Outpatient Visit: 9/22/20 at 11:30    Reason for refill needed outside of office visit-   Appointment not scheduled prior to need for scheduled refill,     Any Reported Side effects: none    Last dose taken: few days ago    Pharmacy: [facility] [address]    Medication: Oxycodone  Dose and directions: 5mg 1 tab every 4 prn  Number dispensed: 80  Date filled ( or Pharmacy): 8/5/20  # left: Out       reviewed: 9/8/20    Date of Urine Drug Screen:      Opioid Safety Handout given:  yes    Appropriate for refill:  yes    Action:  medication pended for Dr. Srinivasa Li approval

## 2020-09-08 NOTE — TELEPHONE ENCOUNTER
Patient calling and left voicemail stating that oxycodone was not sent to his pharmacy and it was due 9/5/2020.

## 2020-09-14 ENCOUNTER — TELEPHONE (OUTPATIENT)
Dept: ONCOLOGY | Age: 45
End: 2020-09-14

## 2020-09-14 DIAGNOSIS — I10 ESSENTIAL HYPERTENSION: ICD-10-CM

## 2020-09-14 RX ORDER — LISINOPRIL 40 MG/1
40 TABLET ORAL DAILY
Qty: 30 TAB | Refills: 1 | Status: SHIPPED | OUTPATIENT
Start: 2020-09-14 | End: 2020-12-08

## 2020-09-14 NOTE — TELEPHONE ENCOUNTER
[address] at [place]  [phone]        09/14/20 10:56 AM Return call placed to patient. He wanted to schedule appointment for port flush/labs/MD visit. Transferred to .[facility], to assist with above. No further questions or concerns at this time.

## 2020-09-15 RX ORDER — SODIUM CHLORIDE 0.9 % (FLUSH) 0.9 %
5-10 SYRINGE (ML) INJECTION AS NEEDED
Status: CANCELLED | OUTPATIENT
Start: 2020-12-10

## 2020-09-15 RX ORDER — SODIUM CHLORIDE 9 MG/ML
10 INJECTION INTRAMUSCULAR; INTRAVENOUS; SUBCUTANEOUS AS NEEDED
Status: CANCELLED | OUTPATIENT
Start: 2020-12-10

## 2020-09-15 RX ORDER — HEPARIN 100 UNIT/ML
500 SYRINGE INTRAVENOUS AS NEEDED
Status: CANCELLED | OUTPATIENT
Start: 2020-12-10

## 2020-09-15 NOTE — PROGRESS NOTES
[id number] [facility], [facility] at [address] [place] [address]  [phone]    Hematology / Oncology Established Visit    Reason for Visit:   Liborio Ocampo is a 39 y.o. male who is seen by synchronous (real-time) audio-video technology on 9/17/2020 for follow up of lymphoma as well as nausea, vomiting. Hematology Oncology Treatment History:     Diagnosis: Follicular lymphoma    Stage: IV    Pathology:   11/13/18 right inguinal LN excision: Follicular lymphoma, high-grade (grade 3a of 3). Comment   The delaney architecture is entirely effaced by atypical lymphocytic proliferation with prominent nodular growth pattern. The majority of the atypical lymphocytes are small to medium in size and have irregular nuclear outlines and inconspicuous nucleoli, morphologically consistent with centrocytes. Scattered large lymphocytes with prominent nucleoli, consistent with centroblasts are identified (> 15 per high-power field). Focal increase in mitotic figures is noted. Occasional follicular dendritic cells are seen. By immunohistochemistry, the atypical lymphocytes are positive for CD20, PAX5, CD10, BCL6 and BCL2 (weak, focal) and negative for MUM1. CD3, CD5 and CD43 stain numerous background T lymphocytes. Ki-67 reveals a high proliferation index in the neoplastic follicles (overall 43-56%). Clinical history indicates 14 cm retroperitoneal mass with bilateral inguinal lymphadenopathy. In summary, the combined morphologic and phenotypic findings are diagnostic of a high-grade follicular lymphoma (grade 3a of 3). There is no evidence of diffuse large B-cell lymphoma. Flow cytometry analysis:   Monoclonal B-cell population (47 % of all cells) with mild increase in side and forward scatter properties expressing CD19, CD20, CD23 and CD10 with surface lambda light chain restriction. No phenotypically aberrant T-cell population. Flow cytometry was performed at [facility]     Prior Treatment:   1. Obinutuzumab-CHOP. Obinutuzumab: 1000 mg weekly on days 1, 8, 15 for cycle 1, then 1000 mg on day 1 q21 days for cycles 2-6, then monotherapy 1000 mg every 21 days for cycle 7, 8 with Cyclophosphamide 750mg/m2, Doxorubicin 50mg/m2, Vincristine 1.4mg/m2 on day 1 and Prednisone 100mg on Days 1-5, every 21 days for a total of 2 cycles completed late 1/2019. Regimen discontinued due to STEMI. 2. Obinutuzumab + Bendamustine: 1000 mg Obinutuzumab on day 1 + Bendamustine 90mg/m2 on days 1-2 on a 28-day cycle x 4 cycles, completed 5/2019    Current Treatment: Surveillance      Oncologic History:  He states he was in his usual state of health in early November 2018 when he developed low back pain and presented to the ER. The pain was described as constant, radiating to the buttocks, without exacerbating or alleviating factors, associated w/ increased urination, no n/v/d, no dysuria, no fever, no significant weight loss at first, but he did later report 15-lb loss over 1 month due to low appetite. Work-up at outside hospital revealed a retroperitoneal mass seen on CT imaging, and he was transferred to [facility] for further work-up. CT there showed a large retroperitoneal mass encircling the aorta with invasion of the left renal hilum and left adrenal gland. There were bilateral inguinal lymph nodes and moderate left hydronephrosis. He was evaluated while at [facility] and was noted to have palpable nodes in his groin for approximately the past 1 month. No testicular mass, anorexia, weight loss, fevers, night sweats, chest pain, shortness of breath, abdominal pain or nausea. He underwent excisional LN biopsy of right inguinal LN. He was told that he had likely lymphoma. He was advised to follow up as outpatient for PET scan, bone marrow biopsy. However, patient states he was lost to f/u. He never received any calls or appointment details.  His aunt urged patient to seek care elsewhere and his PCP recommended [facility]. At our first meeting on 12/13/18, he tells me that he was working full time, feeling well until early Nov 2018. When he developed the left lower back pain, he went to [facility]. At time of diagnosis, he had no cardiac disease aside from HTN, hyperlipidemia. However, he did have an NSTEMI after cycle 2 of O-CHOP. Was likely unrelated to chemotherapy, but opted to switch treatment to Obinutuzumab-Bendamustine. He completed treatment in 5/2019 and had a CR based on PET. History of Present Illness:   Mr. Zapata is a 39 y.o. male with HTN is seen for follow up of Follicular lymphoma (now on surveillance). Reports nausea, vomiting, and diarrhea has resolved. Reports he continues to have anxiety. His anxiety is poorly managed. He follows with Dr. Faiza Peña for management. He does not want to see a therapist. No recent fevers or infections. His chronic back pain is same as before and manageable. No new abdominal pain. No LAD. FAMILY HISTORY:  His father passed away from pancreatic cancer and also had a history of leukemia. Past Medical History:   Diagnosis Date    Anxiety     Cancer [facility])     lymphoma Nov 2018 receiving chemo    Chronic pain     lower back- lymphoma    Hyperlipidemia     Hypertension     Lymphadenopathy 11/12/2018      Past Surgical History:   Procedure Laterality Date    HX HEART CATHETERIZATION  02/2019    HX OTHER SURGICAL  02/2019    cardiac stent    IR INJECTION PSEUDOANEURYSM  2/26/2019      Social History     Tobacco Use    Smoking status: Current Every Day Smoker     Packs/day: 0.50     Years: 20.00     Pack years: 10.00    Smokeless tobacco: Never Used   Substance Use Topics    Alcohol use: No     Frequency: Never   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 Family History   Problem Relation Age of Onset    Hypertension Father     Diabetes Father     Diabetes Mother    Father had leukemia.      Current Outpatient Medications   Medication Sig    lisinopriL (PRINIVIL, ZESTRIL) 40 mg tablet Take 1 Tab by mouth daily.  oxyCODONE IR (ROXICODONE) 5 mg immediate release tablet Take 1 Tab by mouth every four (4) hours as needed for Pain for up to 30 days. Max Daily Amount: 30 mg.    escitalopram oxalate (LEXAPRO) 20 mg tablet TAKE 1 TABLET BY MOUTH EVERY DAY    Klor-Con M20 20 mEq tablet TAKE 1 TABLET BY MOUTH TWICE A DAY    amoxicillin-clavulanate (AUGMENTIN) 875-125 mg per tablet Take 1 Tab by mouth every twelve (12) hours.  ondansetron hcl (ZOFRAN) 4 mg tablet Take 2 Tabs by mouth every eight (8) hours as needed for Nausea or Vomiting.  metoprolol succinate (TOPROL-XL) 50 mg XL tablet TAKE 1 TABLET BY MOUTH EVERY DAY    atorvastatin (LIPITOR) 40 mg tablet TAKE 1 TAB BY MOUTH NIGHTLY. INDICATIONS: TREATMENT TO SLOW PROGRESSION OF CORONARY ARTERY DISEASE    naloxone (NARCAN) 4 mg/actuation nasal spray Use 1 spray intranasally, then discard. Repeat with new spray every 2 min as needed for opioid overdose symptoms, alternating nostrils.  CHANTIX 1 mg tablet TAKE 1 TABLET BY MOUTH TWICE A DAY    clopidogrel (PLAVIX) 75 mg tab 1 Tab by Per NG tube route daily.  aspirin delayed-release (ASPIR-81) 81 mg tablet Take 1 Tab by mouth daily. No current facility-administered medications for this visit. No Known Allergies     Review of Systems: A complete review of systems was obtained, negative except as described above.     Physical Exam:     Visit Vitals  /85 (BP 1 Location: Left arm, BP Patient Position: Sitting)   Pulse 75   Temp 97.5 °F (36.4 °C) (Temporal)   Resp 17   SpO2 100%     ECOG PS: 0  General: Well developed, no acute distress  Eyes: PERRLA, EOMI, anicteric sclerae  HENT: Atraumatic, OP clear, TMs intact without erythema  Neck: Supple  Lymphatic: No cervical, supraclavicular, axillary or inguinal adenopathy  Respiratory: CTAB, normal respiratory effort  CV: Normal rate, regular rhythm, no murmurs, no peripheral edema  GI: Soft, nontender, nondistended, no masses, no hepatomegaly, no splenomegaly  MS: Normal gait and station. Digits without clubbing or cyanosis. Skin: No rashes, ecchymoses, or petechiae. Normal temperature, turgor, and texture. Neuro/Psych: Alert, oriented. 5/5 strength in all 4 extremities. Appropriate affect, normal judgment/insight. Results:     Lab Results   Component Value Date/Time    WBC 6.8 04/16/2020 11:12 AM    HGB 12.2 04/16/2020 11:12 AM    HCT 35.9 (L) 04/16/2020 11:12 AM    PLATELET 634 33/46/6061 11:12 AM    .8 (H) 04/16/2020 11:12 AM    ABS. NEUTROPHILS 5.0 04/16/2020 11:12 AM    Hemoglobin (POC) 15.0 06/05/2009 02:13 PM    Hematocrit (POC) 39 02/14/2019 01:24 PM     Lab Results   Component Value Date/Time    Sodium 142 04/16/2020 11:12 AM    Potassium 3.8 04/16/2020 11:12 AM    Chloride 111 (H) 04/16/2020 11:12 AM    CO2 28 04/16/2020 11:12 AM    Glucose 126 (H) 04/16/2020 11:12 AM    BUN 9 04/16/2020 11:12 AM    Creatinine 0.98 04/16/2020 11:12 AM    GFR est AA >60 04/16/2020 11:12 AM    GFR est non-AA >60 04/16/2020 11:12 AM    Calcium 9.1 04/16/2020 11:12 AM    Sodium (POC) 136 02/14/2019 01:24 PM    Potassium (POC) 3.9 02/14/2019 01:24 PM    Chloride (POC) 102 02/14/2019 01:24 PM    Glucose (POC) 249 (H) 02/15/2019 10:21 PM    BUN (POC) 14 02/14/2019 01:24 PM    Creatinine (POC) 0.9 02/14/2019 01:24 PM    Calcium, ionized (POC) 1.24 02/14/2019 01:24 PM     Lab Results   Component Value Date/Time    Bilirubin, total 0.2 04/16/2020 11:12 AM    ALT (SGPT) 23 04/16/2020 11:12 AM    Alk.  phosphatase 118 (H) 04/16/2020 11:12 AM    Protein, total 6.2 (L) 04/16/2020 11:12 AM    Albumin 3.4 (L) 04/16/2020 11:12 AM    Globulin 2.8 04/16/2020 11:12 AM     No results found for: IRON, FE, TIBC, IBCT, PSAT, FERR    No results found for: B12LT, FOL, RBCF  Lab Results   Component Value Date/Time    TSH 1.53 09/28/2016 04:40 AM     11/11/18:     Lab Results   Component Value Date/Time    Hepatitis A, IgM NONREACTIVE 2018 04:53 PM    Hepatitis B surface Ag <0.10 2018 04:53 PM    Hepatitis B core, IgM NONREACTIVE 2018 04:53 PM         Imagin/9/18 Abd/pelvis CT: IMPRESSION:  1. Interval development of a large retroperitoneal mass encircling the aorta with invasion of the left renal hilum and left adrenal gland. Several adjacent lymph nodes are seen extending into the peritoneum and underneath the  diaphragmatic natalie. This most likely represents lymphoma. 2. Several new bilateral enlarged inguinal lymph nodes also likely representing lymphoma. 3. Moderate left hydronephrosis with a delayed renal nephrogram related to decreased renal function. This is related to the invasion of the renal hilum. 18 Chest CT: IMPRESSION:  Trace left pleural effusion. Bilateral lower lobe atelectasis. Large  retroperitoneal mass lesion again demonstrated. PET 18:  FINDINGS:  HEAD/NECK: Right palatine tonsil intense hypermetabolism, max SUV 18. Multilevel  bilateral cervical adenopathy, with max SUV 12 in a left supraclavicular node. Cerebral evaluation is limited by normal intense activity. CHEST: Solitary hypermetabolic left axillary node, max SUV 11. ABDOMEN/PELVIS: Bulky retroperitoneal mass max SUV 27, with several additional  small active abdomino-pelvic nodes. Bilateral inguinal nodes with max SUV 12 on  the left. SKELETON: No foci of abnormal hypermetabolism in the axial and visualized  appendicular skeleton. IMPRESSION:   1. Right palatine tonsil tumor involvement (Deauville 5). 2. Bilateral cervical delaney involvement (Deauville 5). 3. Left axillary node involvement (Deauville 5). 4. Bulky retroperitoneal lymphoma mass and additional smaller hypermetabolic  abdomino-pelvic nodes (Deauville 5). 5. Bilateral inguinal delaney involvement (Deauville 5). Deauville Five Point Scale  1. No uptake or no residual uptake (when used interim)  2.  Slight uptake, but below blood pool (mediastinum)  3. Uptake above mediastinal, but below/equal to uptake in the liver  4. Uptake slightly to moderately higher than liver  5. Markedly increased uptake or any new lesion (on response evaluation)  Each FDG-avid (or previously FDG avid) lesion is rated independently. Reference values:  Mediastinal blood pool: 2.1 SUV  Liver (background): 2.2 SUV    PET/CT 2/05/19:   IMPRESSION:  1. No Foci of Abnormal Hypermetabolism (Deauville 1). 2. Resolved activity in the right palatine tonsil, bilateral cervical nodes,left axillary node, retroperitoneal/abdominal pelvic adenopathy, bilateral inguinal nodes. Echo 2/14/19:  Normal cavity size, wall thickness and systolic function (ejection fraction normal). The muscle mass is normal. The cavity shape is normal. The estimated ejection fraction is 41 - 45%. Abnormal wall motion as described on the wall scoring diagram below. End-systolic volume is normal. Normal left ventricular strain. There is mild (grade 1) left ventricular diastolic dysfunction. Normal left ventricular diastolic pressure. End-diastolic volume is normal.    LE arterial duplex 2/22/19:  There is evidence of left groin pseudoaneurysm noted arising from distal common femoral artery, pseudo lobe measures 2.32cm x 2.58cm and pseudo neck length measuring 0.63cm. There is no evidence of hemodynamically significant left lower extremity arterial obstruction. JACLYN is 1.03 on the right and 1.02 on the left. LE arterial duplex s/p Thrombin Injection to Pseudoaneurysm 2/26/19:  Successful thrombin injection procedure of the left groin with no further flow seen. No evidence of hemodnyamically significant obstruction in the left lower extremity. Left lower extremity arterial duplex performed. Confirmed pseudoaneurysm in left groin with small neck. Following thrombin injection, no further flow seen in the pseudoaneurysm.   The left common femoral, profunda femoral, femoral, popliteal, posterior tibial and anterior arteries were imaged. Mainly triphasic flow was seen with no evidence of significantly elevated velocities. Repeat LE arterial duplex 2/27/19:  Continued thrombosed left groin pseudoaneurysm following thrombin injection on 02/26/2019. No flow or color fill is identified. The hematoma measures approximately 2.1 x 2.9 cm in diameter. The common femoral, deep femoral, femoral, and popliteal arteries are patent with mainly tri-phasic flow and no significant hemodynamically obstruction is noted. Stress 5/31/19:  · Normal stress myocardial perfusion without ischemia or infact at 84% MPHR. Normal LV function. LVEF 60%. · No EKG changes of ischemia at peak exercise. · Normal functional capacity. PET 6/03/19: IMPRESSION: No Foci of Abnormal Hypermetabolism (Deauville 1). -MRI lumbar spine 12/18/19:  Mild disc degenerative change at L3-4 and L4-5. Mild canal stenosis at L3-4 and mild left foraminal stenosis at L4-5. Other less severe degenerative findings are as described above. Continued diminished size of retroperitoneal mass-adenopathy,  with diminished soft tissue density at the left renal hilum. -CT chest/abdomen 12/16/19:  Findings are consistent with interval response to therapy    CT c/a/p 5/28/20: IMPRESSION:  Further contraction of the retroperitoneal mantle and chris mesentery,  compatible with treatment response  Stable left hilar soft tissue mass  Slight increased splaying of the duodenum and proximal jejunum is the result, without obstruction  No evidence for recurrence of lymphoma in the chest, abdomen, or pelvis       Assessment & Plan:   Maria T Cosby Sr. is a 39 y.o. male comes in for evaluation and management of lymphoma. 1. Follicular lymphoma: Grade 3a. Although grade 3a disease is considered more indolent and can be treated like grade 1/2 disease, this patient has indications for treatment: bulky disease encircling the aorta causing symptoms.  Bone marrow negative for lymphoma, but was hypercellular. BR better than RCHOP, but based on GALLIUM study, Obinutuzumab-based induction and maintenance prolongs PFS over that seen with rituximab-based therapy. Therefore, I have chosen to treat patient with O-CHOP regimen for 6 cycles, followed by possible maintenance Obinutuzumab. We discussed the risks and benefits of O-CHOP chemotherapy. The potential side effects include, but are not limited to: nausea, vomiting, diarrhea, constipation, Flu-like symptoms, infusion reaction, allergic reaction, flushing, taste changes, increased risk of infection, anemia, fatigue, alopecia, neuropathy, nail changes, cardiac damage, mucositis, myleosuppression, infertility and rarely, death. Patient completed chemoteaching and received a chemotherapy education folder. CR after 2 cycles of O-CHOP, but switched regiment Bendamustine-O due to cardiac event. Chemotherapy consent signed and patient educated about side effects including: cytopenias, infections, bleeding, n/v/d, hair loss, skin rash, secondary malignancy, kidney or liver toxicity. Completed a total of 4 cycles of Bendamustine-O, completed 5/2019. PET completed 6/3/19 showed CR. CT 5/28/20 negative for disease. -- Surveillance labs, MD visit every 3 months, and CT imaging q6mo x 2 yrs; 5/2021 will be 2 years. -- 12 weeks port flush  -- Can remove port at 12/2020 visit. -- Repeat CT c/a/p due late Nov 2020  -- Follow up in 3 months labs, MD visit (mid Dec after CT scan). -- Can space out to q6mo visits at next visit to coincide with scans. 2. Chronic lumbar back pain/anxiety: Left lower back pain is chronic and stable currently. Signed pain contract on 12/28/18 and following with Dr. Yaritza Gongora.   -- Oxycodone 5 mg PRN prescribed by Dr. Andria Carias. -- Lexapro 20mg daily for anxiety. 3. CAD: h/o STEMI 2/14/29 with TOM placed to proximal LAD.  Following with Dr. Zaheer Lockhart and remains on dual antiplatelet therapy, high dose Lipitor, Metoprolol, ACEI. Received only 2 doses of cardiotoxic chemo, Doxorubicin in early 1/2019.  -- Follows with Dr. Violeta Cline in 1 year- this is not scheduled in our system, asked patient to reach out to schedule. 4. Tobacco abuse: Discussed benefits of quitting smoking again. Discussed Chantix again which helped in the past. Discussed replacing hand-to-mouth habit with another item such as Twizzlers or SlimJims. Pt to retry Chantix. No SI/HI. Emotional well being: Pt is coping well with his/her disease and has excellent support. Met with SW in the past as well as today. I appreciate the opportunity to participate in Mr. Sammy Oro Sr.'s care.     Signed By: Remedios Osullivan MD     September 17, 2020

## 2020-09-17 ENCOUNTER — HOSPITAL ENCOUNTER (OUTPATIENT)
Dept: INFUSION THERAPY | Age: 45
Discharge: HOME | End: 2020-09-17
Payer: COMMERCIAL

## 2020-09-17 ENCOUNTER — OFFICE VISIT (OUTPATIENT)
Dept: ONCOLOGY | Age: 45
End: 2020-09-17
Payer: COMMERCIAL

## 2020-09-17 VITALS
OXYGEN SATURATION: 100 % | DIASTOLIC BLOOD PRESSURE: 85 MMHG | RESPIRATION RATE: 17 BRPM | HEART RATE: 75 BPM | SYSTOLIC BLOOD PRESSURE: 138 MMHG | TEMPERATURE: 97.5 F

## 2020-09-17 VITALS
TEMPERATURE: 97.5 F | DIASTOLIC BLOOD PRESSURE: 85 MMHG | OXYGEN SATURATION: 100 % | SYSTOLIC BLOOD PRESSURE: 138 MMHG | RESPIRATION RATE: 17 BRPM | HEART RATE: 75 BPM

## 2020-09-17 DIAGNOSIS — G89.29 CHRONIC BILATERAL LOW BACK PAIN WITHOUT SCIATICA: ICD-10-CM

## 2020-09-17 DIAGNOSIS — C82.90 FOLLICULAR LYMPHOMA, UNSPECIFIED FOLLICULAR LYMPHOMA TYPE, UNSPECIFIED BODY REGION (HCC): Primary | ICD-10-CM

## 2020-09-17 DIAGNOSIS — Z85.72 HISTORY OF FOLLICULAR LYMPHOMA: Primary | ICD-10-CM

## 2020-09-17 DIAGNOSIS — Z71.6 TOBACCO ABUSE COUNSELING: ICD-10-CM

## 2020-09-17 DIAGNOSIS — F41.9 ANXIETY: ICD-10-CM

## 2020-09-17 DIAGNOSIS — M54.50 CHRONIC BILATERAL LOW BACK PAIN WITHOUT SCIATICA: ICD-10-CM

## 2020-09-17 LAB
ALBUMIN SERPL-MCNC: 3.5 G/DL (ref 3.5–5)
ALBUMIN/GLOB SERPL: 1.1 {RATIO} (ref 1.1–2.2)
ALP SERPL-CCNC: 131 U/L (ref 45–117)
ALT SERPL-CCNC: 39 U/L (ref 12–78)
ANION GAP SERPL CALC-SCNC: 5 MMOL/L (ref 5–15)
AST SERPL-CCNC: 49 U/L (ref 15–37)
BASOPHILS # BLD: 0.1 K/UL (ref 0–0.1)
BASOPHILS NFR BLD: 1 % (ref 0–1)
BILIRUB SERPL-MCNC: 0.6 MG/DL (ref 0.2–1)
BUN SERPL-MCNC: 5 MG/DL (ref 6–20)
BUN/CREAT SERPL: 4 (ref 12–20)
CALCIUM SERPL-MCNC: 8.8 MG/DL (ref 8.5–10.1)
CHLORIDE SERPL-SCNC: 109 MMOL/L (ref 97–108)
CO2 SERPL-SCNC: 27 MMOL/L (ref 21–32)
CREAT SERPL-MCNC: 1.12 MG/DL (ref 0.7–1.3)
DIFFERENTIAL METHOD BLD: ABNORMAL
EOSINOPHIL # BLD: 0.4 K/UL (ref 0–0.4)
EOSINOPHIL NFR BLD: 5 % (ref 0–7)
ERYTHROCYTE [DISTWIDTH] IN BLOOD BY AUTOMATED COUNT: 13.2 % (ref 11.5–14.5)
GLOBULIN SER CALC-MCNC: 3.2 G/DL (ref 2–4)
GLUCOSE SERPL-MCNC: 70 MG/DL (ref 65–100)
HCT VFR BLD AUTO: 43.6 % (ref 36.6–50.3)
HGB BLD-MCNC: 14.6 G/DL (ref 12.1–17)
IMM GRANULOCYTES # BLD AUTO: 0 K/UL (ref 0–0.04)
IMM GRANULOCYTES NFR BLD AUTO: 0 % (ref 0–0.5)
LDH SERPL L TO P-CCNC: 171 U/L (ref 85–241)
LYMPHOCYTES # BLD: 1.2 K/UL (ref 0.8–3.5)
LYMPHOCYTES NFR BLD: 17 % (ref 12–49)
MCH RBC QN AUTO: 33.6 PG (ref 26–34)
MCHC RBC AUTO-ENTMCNC: 33.5 G/DL (ref 30–36.5)
MCV RBC AUTO: 100.2 FL (ref 80–99)
MONOCYTES # BLD: 0.6 K/UL (ref 0–1)
MONOCYTES NFR BLD: 8 % (ref 5–13)
NEUTS SEG # BLD: 5.2 K/UL (ref 1.8–8)
NEUTS SEG NFR BLD: 69 % (ref 32–75)
NRBC # BLD: 0 K/UL (ref 0–0.01)
NRBC BLD-RTO: 0 PER 100 WBC
PLATELET # BLD AUTO: 221 K/UL (ref 150–400)
PMV BLD AUTO: 11.5 FL (ref 8.9–12.9)
POTASSIUM SERPL-SCNC: 3.1 MMOL/L (ref 3.5–5.1)
PROT SERPL-MCNC: 6.7 G/DL (ref 6.4–8.2)
RBC # BLD AUTO: 4.35 M/UL (ref 4.1–5.7)
SODIUM SERPL-SCNC: 141 MMOL/L (ref 136–145)
WBC # BLD AUTO: 7.4 K/UL (ref 4.1–11.1)

## 2020-09-17 PROCEDURE — 77030016057 HC NDL HUBR APOL -B

## 2020-09-17 PROCEDURE — 74011000250 HC RX REV CODE- 250: Performed by: NURSE PRACTITIONER

## 2020-09-17 PROCEDURE — 36591 DRAW BLOOD OFF VENOUS DEVICE: CPT

## 2020-09-17 PROCEDURE — 99214 OFFICE O/P EST MOD 30 MIN: CPT | Performed by: INTERNAL MEDICINE

## 2020-09-17 PROCEDURE — 80053 COMPREHEN METABOLIC PANEL: CPT

## 2020-09-17 PROCEDURE — 85025 COMPLETE CBC W/AUTO DIFF WBC: CPT

## 2020-09-17 PROCEDURE — 74011250636 HC RX REV CODE- 250/636: Performed by: NURSE PRACTITIONER

## 2020-09-17 PROCEDURE — 83615 LACTATE (LD) (LDH) ENZYME: CPT

## 2020-09-17 RX ORDER — SODIUM CHLORIDE 0.9 % (FLUSH) 0.9 %
5-10 SYRINGE (ML) INJECTION AS NEEDED
Status: DISPENSED | OUTPATIENT
Start: 2020-09-17 | End: 2020-09-17

## 2020-09-17 RX ORDER — HEPARIN 100 UNIT/ML
500 SYRINGE INTRAVENOUS AS NEEDED
Status: ACTIVE | OUTPATIENT
Start: 2020-09-17 | End: 2020-09-17

## 2020-09-17 RX ORDER — SODIUM CHLORIDE 9 MG/ML
10 INJECTION INTRAMUSCULAR; INTRAVENOUS; SUBCUTANEOUS AS NEEDED
Status: ACTIVE | OUTPATIENT
Start: 2020-09-17 | End: 2020-09-17

## 2020-09-17 RX ADMIN — Medication 500 UNITS: at 11:28

## 2020-09-17 RX ADMIN — SODIUM CHLORIDE 10 ML: 9 INJECTION, SOLUTION INTRAMUSCULAR; INTRAVENOUS; SUBCUTANEOUS at 11:28

## 2020-09-17 RX ADMIN — SODIUM CHLORIDE 10 ML: 9 INJECTION, SOLUTION INTRAMUSCULAR; INTRAVENOUS; SUBCUTANEOUS at 11:27

## 2020-09-17 NOTE — PATIENT INSTRUCTIONS
Chantix Instructions:    Days 1 to 3: 0.5 mg once daily. Days 4 to 7: 0.5 mg twice daily.     Maintenance (day 8 and later): 1 mg twice daily; may consider a temporary or permanent dose reduction if usual dose is not tolerated    Duration: Continue maintenance dose for 11 weeks (for a total of 12 weeks of treatment); if the patient successfully quits smoking at the end of 12 weeks, an additional 12-week course may increase likelihood of success

## 2020-09-17 NOTE — PROGRESS NOTES
[facility] Progress Note    Date: 2020    Name: Norah Fernandez. MRN: [id number]         : 1975    Mr. Caleb Foster Arrived ambulatory and in no distress for Port flush/lab Regimen. Assessment was completed, no acute issues at this time, no new complaints voiced. Right chest wall port accessed without difficulty, labs drawn & sent for processing. Mr. Sue Diaz vitals were reviewed. Visit Vitals  /85   Pulse 75   Temp 97.5 °F (36.4 °C)   Resp 17   SpO2 100%       Labs obtained and pending, please see [facility]. Mr. Caleb Foster was discharged from [place] [address] in stable condition. Port de-accessed, flushed & heparinized per protocol. He is to return on 12/10/20  for his next appointment.     Marjorie Covarrubias RN  2020  \

## 2020-10-05 ENCOUNTER — TELEPHONE (OUTPATIENT)
Dept: PALLATIVE CARE | Age: 45
End: 2020-10-05

## 2020-10-05 DIAGNOSIS — G89.29 CHRONIC LEFT-SIDED LOW BACK PAIN WITHOUT SCIATICA: ICD-10-CM

## 2020-10-05 DIAGNOSIS — M54.50 CHRONIC LEFT-SIDED LOW BACK PAIN WITHOUT SCIATICA: ICD-10-CM

## 2020-10-05 RX ORDER — OXYCODONE HYDROCHLORIDE 5 MG/1
5 TABLET ORAL
Qty: 80 TAB | Refills: 0 | Status: SHIPPED | OUTPATIENT
Start: 2020-10-05 | End: 2020-10-13 | Stop reason: SDUPTHER

## 2020-10-05 NOTE — TELEPHONE ENCOUNTER
Palliative Medicine  Nursing Note  [phone]  Fax [phone]      Telephone Call  Patient Name: Justus Zaidi. YOB: 1975    10/5/2020        Primary Decision Maker: [de-identified] - Ex-Spouse - [phone]   Advance Care Planning 6/29/2020   Patient's Healthcare Decision Maker is: Verbal statement (Legal Next of Kin remains as decision maker)   Confirm Advance Directive None   Patient Would Like to Complete Advance Directive No   Does the patient have other document types -     Returned call to Mr. Bri Marti. But was unable to leave voice message as it was full. He had left a message over the weekend with Palliative stating that he would run out of Oxycodone today. Patient returned call and provided the below information.     Triage for Controlled Substance Refill Request    Pain Diagnosis: low back pain    Last Outpatient Visit: 6/29/20    Next Outpatient Visit: 10/13/20 at 11:30    Reason for refill needed outside of office visit- Appointment not scheduled prior to need for scheduled refill,     Any Reported Side effects: none    Last dose taken: today    Pharmacy: [facility]    Medication: oxycodone 5mg  Dose and directions: 1 tab every 4 hours prn for up to 30 days  Number dispensed: 80  Date filled ( or Pharmacy): 9/6/20   # left: stated that he would be out today       reviewed: Yes    Date of Urine Drug Screen:      Opioid Safety Handout given:  yes    Appropriate for refill:  Yes    Action:  Medication pended for Dr. Ash Mendez approval    Constantin Kellogg, RN  Palliative Medicine

## 2020-10-05 NOTE — TELEPHONE ENCOUNTER
Patient calling and left voicemail over weekend requesting a refill on his oxycodone. States he will run out on Monday.

## 2020-10-08 ENCOUNTER — APPOINTMENT (OUTPATIENT)
Dept: INFUSION THERAPY | Age: 45
End: 2020-10-08

## 2020-10-13 ENCOUNTER — TELEPHONE (OUTPATIENT)
Dept: PALLATIVE CARE | Age: 45
End: 2020-10-13

## 2020-10-13 ENCOUNTER — DOCUMENTATION ONLY (OUTPATIENT)
Dept: PALLATIVE CARE | Age: 45
End: 2020-10-13

## 2020-10-13 ENCOUNTER — VIRTUAL VISIT (OUTPATIENT)
Dept: PALLATIVE CARE | Age: 45
End: 2020-10-13
Payer: COMMERCIAL

## 2020-10-13 DIAGNOSIS — R63.0 POOR APPETITE: ICD-10-CM

## 2020-10-13 DIAGNOSIS — E87.6 HYPOKALEMIA: ICD-10-CM

## 2020-10-13 DIAGNOSIS — R53.83 OTHER FATIGUE: ICD-10-CM

## 2020-10-13 DIAGNOSIS — C82.33 FOLLICULAR LYMPHOMA GRADE IIIA OF INTRA-ABDOMINAL LYMPH NODES (HCC): ICD-10-CM

## 2020-10-13 DIAGNOSIS — F32.9 REACTIVE DEPRESSION: ICD-10-CM

## 2020-10-13 DIAGNOSIS — G89.29 CHRONIC LEFT-SIDED LOW BACK PAIN WITHOUT SCIATICA: Primary | ICD-10-CM

## 2020-10-13 DIAGNOSIS — M54.50 CHRONIC LEFT-SIDED LOW BACK PAIN WITHOUT SCIATICA: Primary | ICD-10-CM

## 2020-10-13 DIAGNOSIS — F41.9 ANXIETY: ICD-10-CM

## 2020-10-13 PROCEDURE — 99214 OFFICE O/P EST MOD 30 MIN: CPT | Performed by: INTERNAL MEDICINE

## 2020-10-13 RX ORDER — OXYCODONE HYDROCHLORIDE 5 MG/1
5 TABLET ORAL
Qty: 80 TAB | Refills: 0 | Status: SHIPPED | OUTPATIENT
Start: 2020-12-04 | End: 2021-01-03

## 2020-10-13 RX ORDER — OXYCODONE HYDROCHLORIDE 5 MG/1
5 TABLET ORAL
Qty: 80 TAB | Refills: 0 | Status: SHIPPED | OUTPATIENT
Start: 2021-01-03 | End: 2021-01-05 | Stop reason: SDUPTHER

## 2020-10-13 RX ORDER — OXYCODONE HYDROCHLORIDE 5 MG/1
5 TABLET ORAL
Qty: 80 TAB | Refills: 0 | Status: SHIPPED | OUTPATIENT
Start: 2020-11-04 | End: 2020-12-02 | Stop reason: SDUPTHER

## 2020-10-13 NOTE — PATIENT INSTRUCTIONS
Dear Aiyana Brush. , 
 
It was a pleasure seeing you today in [facility]. We will see you again in 12 weeks If labs or imaging tests have been ordered for you today, please call the office at [phone] 48 hours after completion to obtain the results. Your described symptoms were: Fatigue: 5 Drowsiness: 3 Depression: 5 Pain: 7 Anxiety: 10 Nausea: 0 Anorexia: 5 Dyspnea: 0 Best Well-Bein Constipation: No  
Other Problem (Comment): 0 This is the plan we talked about: 1. Back pain  
-Continue oxycodone 5-mg every 4 hours as needed 
-Today I sent another 3 1-month oxycodone prescriptions (#80) to your pharmacy for pick-up on ,  and 1/3 
-Continue tylenol as needed for moderate pain 
-Continue heat or ice as needed 
-Avoid taking ibuprofen, naprosyn or other any over-the-counter pain relievers which may interact with your blood thinner. 2. Anxiety/depression 
-Continue escitalopram to 20-mg daily. 
-Today our , Lucrecia Lyle, sat in during our visit  
-She shared with you how she can support you through individual meetings with her 
-Wilfrid Salguero is going to check in with you at the end of the week to see what your thoughts are about this 3. Fatigue 
-This is chronic and unchanged 
-You continue to work full-time as [employer] 
-You're now in  - [address] [place] [address] on this achievement! 4. Poor appetite 
-You're eating and you haven't lost any weight 5. Low potassium 
-Your potassium level was 3.1 (normal 3.5-5.1) when Dr. Beth Willard checked your labs last month 
-It's very important to keep your potassium level within normal limits for your general health and given your heart disease history 
-Today Rae Benítez called your and reviewed foods that are high in potassium which you can start to include in your diet. I included this list with this after visit summary. -I recommend you establish care with a primary care doctor who can follow this for you 
-Jo Ann Vigil will check in with you about this when she calls you later this week 6. Lymphoma 
-You saw Dr. Renetta Melendez last month 
-Judith Rahman going to get some labs, the may have your port removed 
-You anticipate having repeat scans in December This is what you have shared with us about Advance Care Planning: 
 
  Primary Decision Maker: [de-identified] - Ex-Spouse - [phone] [] Named in a scanned document  
[x] Legal Next of Kin 
[] Guardian ACP documents you current have include: 
[] Advance Directive or Living Will 
[] Durable Do Not Resuscitate 
[] Physician Orders for Scope of Treatment (POST) [] Medical Power of  
[] Other The Palliative Medicine Team is here to support you and your family. Sincerely, Emily Martin MD and the Palliative Medicine Team

## 2020-10-13 NOTE — TELEPHONE ENCOUNTER
Palliative Medicine  Nursing Note  [phone]  Fax [phone]      Telephone Call  Patient Name: Rebecca Rashid. YOB: 1975    10/13/2020        Primary Decision Maker: [de-identified] - Ex-Spouse - [phone]   Advance Care Planning 10/13/2020   Patient's Healthcare Decision Maker is: Unable to obtain   Confirm Advance Directive None   Patient Would Like to Complete Advance Directive No   Does the patient have other document types -     Call placed to Mr. Jeffery Burns to discuss potassium rich food due to his previously low lab values. No answer, and unable to leave a message as his voice mail was full. Information will be placed in the mail to his home for him to review.     Wayne Matute RN  Palliative Medicine

## 2020-10-13 NOTE — PROGRESS NOTES
Palliative Medicine Office Visit  Palliative Medicine Nurse Check In  [phone]    Patient Name: Alaina Landers. YOB: 1975      Date of Office Visit: 10/13/2020    Patient states: \"  \"    From Check In Sheet (scanned in Media):  Has a medical provider talked with you about cardiopulmonary resuscitation (CPR)? [x] Yes   [] No   [] Unable to obtain    Nurse reminder to complete or update ACP FlowSheet:    Is ACP on the Problem List?    [] Yes    [x] No  IF ACP Document is ON FILE; Nurse to place ACP on Problem List     Is there an ACP Note in Chart Review/Note? [] Yes    [x] No   If NO: ALERT PROVIDER       Primary Decision Maker: [de-identified] - Ex-Spouse - [phone]  Advance Care Planning 10/13/2020   Patient's Healthcare Decision Maker is: Unable to obtain   Confirm Advance Directive None   Patient Would Like to Complete Advance Directive No   Does the patient have other document types -         Is there anything that we should know about you as a person in order to provide you the best care possible? Have you been to the ER, urgent care clinic since your last visit? [x] Yes   [] No   [] Unable to obtain    Have you been hospitalized since your last visit? [] Yes   [x] No   [] Unable to obtain    Have you seen or consulted any other health care providers outside of the [address] [place] [address] since your last visit?    [] Yes   [x] No   [] Unable to obtain    Functional status (describe):         Last BM: 10/12/2020     accessed (date): 10/13/2020    Bottle review (for opioid pain medication):  Medication 1:   Date filled:   Directions:   # filled:   # left:   # pills taking per day:  Last dose taken:    Medication 2:   Date filled:   Directions:   # filled:   # left:   # pills taking per day:  Last dose taken:    Medication 3:   Date filled:   Directions:   # filled:   # left:   # pills taking per day:  Last dose taken:    Medication 4:   Date filled: Directions:   # filled:   # left:   # pills taking per day:  Last dose taken:

## 2020-10-13 NOTE — PROGRESS NOTES
Palliative Medicine Outpatient Services  [place]: [phone] ([phone])    Patient Name: Sunil Lloyd. YOB: 1975    Date of Current Visit: 10/13/20  Location of Current Visit:    [] [facility] Office  [] [facility] Office  [] [address] [place] [address]  [] Home  [x] Other:  televisit    Date of Initial Visit: 2/19/19   Referral from: Jaime Leyva MD  Primary Care Physician: None      SUMMARY:   Sunil Flores is a 39y.o. year old with high-grade follicular lymphoma, who was referred to Palliative Medicine by Dr. Lulu Concepcion for symptom management and supportive care. He was diagnosed in 11/2018 after presenting with back pain. He is currently receiving systemic chemotherapy. He suffered cardiac arrest during cycle #3 due to previously undiagnosed severe stenosis of the LAD s/p PTCA and stent. He has since completed systemic chemotherapy. His most recent PET-CT (5/2019) showed no focal areas of increased hypermetabolic uptake. The patients social history includes: he is single. He lives in [facility]. He has 3 teenage children who live with their mother and with whom he has close contact. 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 He's applied for disability. Palliative Medicine is addressing the following current patient/family concerns: anxiety, depression, left mid- and low back pain, poor appetite, fatigue, advanced care planning. Initial Referral Intake note from Talha Richmond RN reviewed prior to visit   PALLIATIVE DIAGNOSES:       ICD-10-CM ICD-9-CM    1. Chronic left-sided low back pain without sciatica  M54.5 724.2 oxyCODONE IR (ROXICODONE) 5 mg immediate release tablet    G89.29 338.29 oxyCODONE IR (ROXICODONE) 5 mg immediate release tablet      oxyCODONE IR (ROXICODONE) 5 mg immediate release tablet   2. Anxiety  F41.9 300.00    3. Reactive depression  F32.9 300.4    4. Other fatigue  R53.83 780.79    5. Poor appetite  R63.0 783.0    6. Hypokalemia  E87.6 276.8    7.  Follicular lymphoma grade IIIa of intra-abdominal lymph nodes [facility])  C82.33 202.03           PLAN:   Patient Instructions       Dear Aiyana Brush. ,    It was a pleasure seeing you today in [facility]. We will see you again in 12 weeks    If labs or imaging tests have been ordered for you today, please call the office at [phone] 48 hours after completion to obtain the results. Your described symptoms were: Fatigue: 5 Drowsiness: 3   Depression: 5 Pain: 7   Anxiety: 10 Nausea: 0   Anorexia: 5 Dyspnea: 0   Best Well-Bein Constipation: No   Other Problem (Comment): 0       This is the plan we talked about:      1. Back pain   -Continue oxycodone 5-mg every 4 hours as needed  -Today I sent another 3 1-month oxycodone prescriptions (#80) to your pharmacy for pick-up on ,  and 1/3  -Continue tylenol as needed for moderate pain  -Continue heat or ice as needed  -Avoid taking ibuprofen, naprosyn or other any over-the-counter pain relievers which may interact with your blood thinner. 2. Anxiety/depression  -Continue escitalopram to 20-mg daily.  -Today our , Lucrecia Lyle, sat in during our visit   -She shared with you how she can support you through individual meetings with her  -Wilfrid Salguero is going to check in with you at the end of the week to see what your thoughts are about this    3. Fatigue  -This is chronic and unchanged  -You continue to work full-time as [employer]  -You're now in  - [address] [place] [address] on this achievement! 4. Poor appetite  -You're eating and you haven't lost any weight    5. Low potassium  -Your potassium level was 3.1 (normal 3.5-5.1) when Dr. Beth Willard checked your labs last month  -It's very important to keep your potassium level within normal limits for your general health and given your heart disease history  -Today Rae Benítez called your and reviewed foods that are high in potassium which you can start to include in your diet.  I included this list with this after visit summary.  -I recommend you establish care with a primary care doctor who can follow this for you  -Naseemsiomara Lakatlin will check in with you about this when she calls you later this week    6. Lymphoma  -You saw Dr. Felicitas Matthews last month  -Clarke Douglas going to get some labs, the may have your port removed  -You anticipate having repeat scans in December         This is what you have shared with us about Advance Care Planning:      Primary Decision Maker: Chely Iverson - Ex-Spouse - [phone]  [] Named in a scanned document   [x] Legal Next of Kin  [] Guardian    ACP documents you current have include:  [] Advance Directive or Living Will  [] Durable Do Not Resuscitate  [] Physician Orders for Scope of Treatment (POST)  [] Medical Power of   [] Other      The Palliative Medicine Team is here to support you and your family.          Sincerely,      Daniela Villasenor MD and the Palliative Medicine Team                Counseling and Coordination:        GOALS OF CARE / TREATMENT PREFERENCES:   [====Goals of Care====]  GOALS OF CARE:  Patient / health care proxy stated goals: See Patient Instructions / Summary    TREATMENT PREFERENCES:   Code Status:  [x] Attempt Resuscitation       [] Do Not Attempt Resuscitation    Advance Care Planning:  [x] [facility] Interdisciplinary Team has updated the ACP Navigator with Decision Maker and Patient Capacity      Primary Decision Maker: Chely Iverson - Ex-Spouse - [phone]  [] Named in a scanned document   [x] Legal Next of Kin  [] Guardian    Other:  (If patient appropriate for POST, consider using PALLPOST smart phrase here)    The palliative care team has discussed with patient / health care proxy about goals of care / treatment preferences for patient.  [====Goals of Care====]     PRESCRIPTIONS GIVEN:     Medications Ordered Today   Medications    oxyCODONE IR (ROXICODONE) 5 mg immediate release tablet     Sig: Take 1 Tab by mouth every four (4) hours as needed for Pain for up to 30 days. Max Daily Amount: 30 mg. Dispense:  80 Tab     Refill:  0    oxyCODONE IR (ROXICODONE) 5 mg immediate release tablet     Sig: Take 1 Tab by mouth every four (4) hours as needed for Pain for up to 30 days. Max Daily Amount: 30 mg. Dispense:  80 Tab     Refill:  0    oxyCODONE IR (ROXICODONE) 5 mg immediate release tablet     Sig: Take 1 Tab by mouth every four (4) hours as needed for Pain for up to 30 days. Max Daily Amount: 30 mg. Dispense:  80 Tab     Refill:  0           FOLLOW UP:     Future Appointments   Date Time Provider Dahlia Supriya   12/10/2020 11:00 AM SS INF7 CH2 <1H [facility]   12/10/2020 11:15 AM Kira Blake, RODY ONCSF BS AMB           PHYSICIANS INVOLVED IN CARE:   Patient Care Team:  None as PCP - Monster Mcduffie MD (Hematology and Oncology)  Cuca Bello MD as Physician (Palliative Medicine)  Cuca Bello MD as Physician (Palliative Medicine)       HISTORY:   Reviewed patient-completed ESAS and advance care planning form. Reviewed patient record in prescription monitoring program.    CHIEF COMPLAINT:   Chief Complaint   Patient presents with    Back Pain       HPI/SUBJECTIVE:    The patient is: [x] Verbal / [] Nonverbal       He's OK. His back pain is about the same. He always has some pain, manageable with oxycodone, which he uses on days he works. 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 He' has a new baby on the way. The baby is due in January. His anxiety is high, worse sometimes than others. He deals with this with deep breathing, helps some. He's eating (doesn't always have an appetite.)    He's moving his bowels regularly. He saw Dr. Whit Quintero last month. He had labs done, then may get his port out. He gets scans again in December.     Clinical Pain Assessment (nonverbal scale for nonverbal patients):   [++++ Clinical Pain Assessment++++]  [++++Pain Severity++++]: Pain: 7  [++++Pain Character++++]: stabbing pain in back  [++++Pain Duration++++]: months for back pain, weeks for groin pain  [++++Pain Effect++++]: little  [++++Pain Factors++++]: oxycodone helps with back pain, groin pain elicited by standing and walking  [++++Pain Frequency++++]: constant back pain with varying intensity  [++++Pain Location++++]: left lower back  [++++ Clinical Pain Assessment++++]       FUNCTIONAL ASSESSMENT:     Palliative Performance Scale (PPS):  PPS: 70       PSYCHOSOCIAL/SPIRITUAL SCREENING:     Any spiritual / Zoroastrian concerns:  [] Yes /  [x] No    Caregiver Burnout:  [] Yes /  [x] No /  [] No Caregiver Present      Anticipatory grief assessment:   [x] Normal  / [] Maladaptive       ESAS Anxiety: Anxiety: 10    ESAS Depression: Depression: 5       REVIEW OF SYSTEMS:     The following systems were [x] reviewed / [] unable to be reviewed  Systems: constitutional, ears/nose/mouth/throat, respiratory, gastrointestinal, genitourinary, musculoskeletal, integumentary, neurologic, psychiatric, endocrine. Positive findings noted below. Modified ESAS Completed by: provider   Fatigue: 5 Drowsiness: 3   Depression: 5 Pain: 7   Anxiety: 10 Nausea: 0   Anorexia: 5 Dyspnea: 0   Best Well-Bein Constipation: No   Other Problem (Comment): 0          PHYSICAL EXAM:     Wt Readings from Last 3 Encounters:   20 160 lb (72.6 kg)   20 156 lb (70.8 kg)   20 159 lb (72.1 kg)     There were no vitals taken for this visit.   Last bowel movement: See Nursing Note    Constitutional    [] Appears well-developed and well-nourished in no apparent distress    [x] Abnormal: appears fatigued  Mental status  [x] Alert and awake  [x] Oriented to person/place/time  [x] Able to follow commands  [] Abnormal:   Eyes  [x] EOM normal   [x] Sclera normal   [x] No visible ocular discharge  [] Abnormal:   HENT  [x] Normocephalic, atraumatic  [x] Mouth/Throat: Moist mucous membranes   [x] External Ears normal  [] Abnormal:  Neck  [x] No visualized mass  [] Abnormal:  Pulmonary/Chest   [x] Respiratory effort normal  [x] No visualized signs of difficulty breathing or respiratory distress  [] Abnormal:  Musculoskeletal  [x] Normal gait with no signs of ataxia  [x] Normal range of motion of neck  [] Abnormal:  Neurological:   [x] No facial asymmetry (Cranial nerve 7 motor function)  [x] No gaze palsy  [] Abnormal:   Skin  [x] No significant exanthematous lesions or discoloration noted on facial skin  [] Abnormal:                                  Psychiatric  [x] Normal affect  [x] No hallucinations  [] Abnormal:    Other pertinent observable physical exam findings:    Due to this being a TeleHealth evaluation, many elements of the physical examination are unable to be assessed. HISTORY:     Past Medical History:   Diagnosis Date    Anxiety     Cancer [facility])     lymphoma Nov 2018 receiving chemo    Chronic pain     lower back- lymphoma    Hyperlipidemia     Hypertension     Lymphadenopathy 11/12/2018      Past Surgical History:   Procedure Laterality Date    HX HEART CATHETERIZATION  02/2019    HX OTHER SURGICAL  02/2019    cardiac stent    IR INJECTION PSEUDOANEURYSM  2/26/2019      Family History   Problem Relation Age of Onset    Hypertension Father     Diabetes Father     Diabetes Mother       History reviewed, no pertinent family history. Social History     Tobacco Use    Smoking status: Current Every Day Smoker     Packs/day: 0.50     Years: 20.00     Pack years: 10.00    Smokeless tobacco: Never Used   Substance Use Topics    Alcohol use: No     Frequency: Never     No Known Allergies   Current Outpatient Medications   Medication Sig    [START ON 11/4/2020] oxyCODONE IR (ROXICODONE) 5 mg immediate release tablet Take 1 Tab by mouth every four (4) hours as needed for Pain for up to 30 days.  Max Daily Amount: 30 mg.    [START ON 12/4/2020] oxyCODONE IR (ROXICODONE) 5 mg immediate release tablet Take 1 Tab by mouth every four (4) hours as needed for Pain for up to 30 days. Max Daily Amount: 30 mg.    [START ON 1/3/2021] oxyCODONE IR (ROXICODONE) 5 mg immediate release tablet Take 1 Tab by mouth every four (4) hours as needed for Pain for up to 30 days. Max Daily Amount: 30 mg.    lisinopriL (PRINIVIL, ZESTRIL) 40 mg tablet Take 1 Tab by mouth daily.  escitalopram oxalate (LEXAPRO) 20 mg tablet TAKE 1 TABLET BY MOUTH EVERY DAY    Klor-Con M20 20 mEq tablet TAKE 1 TABLET BY MOUTH TWICE A DAY    ondansetron hcl (ZOFRAN) 4 mg tablet Take 2 Tabs by mouth every eight (8) hours as needed for Nausea or Vomiting.  metoprolol succinate (TOPROL-XL) 50 mg XL tablet TAKE 1 TABLET BY MOUTH EVERY DAY    atorvastatin (LIPITOR) 40 mg tablet TAKE 1 TAB BY MOUTH NIGHTLY. INDICATIONS: TREATMENT TO SLOW PROGRESSION OF CORONARY ARTERY DISEASE    naloxone (NARCAN) 4 mg/actuation nasal spray Use 1 spray intranasally, then discard. Repeat with new spray every 2 min as needed for opioid overdose symptoms, alternating nostrils.  clopidogrel (PLAVIX) 75 mg tab 1 Tab by Per NG tube route daily. No current facility-administered medications for this visit. LAB DATA REVIEWED:     Lab Results   Component Value Date/Time    WBC 7.4 09/17/2020 11:32 AM    HGB 14.6 09/17/2020 11:32 AM    PLATELET 233 20/33/9005 11:32 AM     Lab Results   Component Value Date/Time    Sodium 141 09/17/2020 11:32 AM    Potassium 3.1 (L) 09/17/2020 11:32 AM    Chloride 109 (H) 09/17/2020 11:32 AM    CO2 27 09/17/2020 11:32 AM    BUN 5 (L) 09/17/2020 11:32 AM    Creatinine 1.12 09/17/2020 11:32 AM    Calcium 8.8 09/17/2020 11:32 AM    Magnesium 1.7 01/12/2019 04:05 AM    Phosphorus 2.0 (L) 01/12/2019 04:05 AM      Lab Results   Component Value Date/Time    Alk.  phosphatase 131 (H) 09/17/2020 11:32 AM    Protein, total 6.7 09/17/2020 11:32 AM Albumin 3.5 09/17/2020 11:32 AM    Globulin 3.2 09/17/2020 11:32 AM     Lab Results   Component Value Date/Time    INR 1.1 02/22/2019 08:18 PM    Prothrombin time 10.8 02/22/2019 08:18 PM    aPTT 27.8 02/22/2019 08:18 PM      No results found for: IRON, FE, TIBC, IBCT, PSAT, FERR       MRI lumbar spine 12/18/19:  Congenital narrowing of the lumbar canal. Vertebral body heights are maintained. Marrow signal is normal.     The conus medullaris terminates at T12/L1. Signal and caliber of the distal  spinal cord are within normal limits. There is no pathologic intrathecal  enhancement.     The paraspinal soft tissues are within normal limits.     Lower thoracic spine: No herniation or stenosis.     L1-L2: No herniation or stenosis.     L2-L3: No herniation or stenosis.     L3-L4: Mild facet arthropathy. Minimal central disc protrusion. Mild canal  stenosis. Foramina are patent     L4-L5: Desiccation. Mild facet arthropathy. Canal demonstrates minimal stenosis. There is an annular ligament tear far laterally on the left. Mild left foraminal  stenosis.     L5-S1: Mild facet arthropathy. Canal and foramina are patent. IMPRESSION:  Mild disc degenerative change at L3-4 and L4-5.     Mild canal stenosis at L3-4 and mild left foraminal stenosis at L4-5.     Other less severe degenerative findings are as described above. CT chest/abdomen 12/16/19:  FINDINGS:      THYROID: No nodule. MEDIASTINUM: No mass or lymphadenopathy. Port catheter in place on the right. Catheter tip in appropriate position. SB: No mass or lymphadenopathy. THORACIC AORTA: No dissection or aneurysm. MAIN PULMONARY ARTERY: Unremarkable  TRACHEA/BRONCHI: Unremarkable  ESOPHAGUS: No wall thickening or dilatation. HEART: Normal in size. PLEURA: No effusion or pneumothorax. LUNGS: Bibasilar atelectasis is noted. Nneka Messing LIVER: No mass or biliary dilatation. GALLBLADDER: Layering contrast versus cholelithiasis. SPLEEN: No mass. PANCREAS: No mass or ductal dilatation. ADRENALS: The left adrenal gland is elevated by the retroperitoneal mass. The    right is unremarkable. KIDNEYS: There is diminished soft tissue density at the level of the left renal  hilum. There is increased left renal cortical atrophy. STOMACH: Unremarkable. SMALL BOWEL: No dilatation or wall thickening. COLON: No dilatation or wall thickening. APPENDIX: Unremarkable. PERITONEUM: No ascites or pneumoperitoneum. RETROPERITONEUM:   Diminished size of retroperitoneal mass. Diminished in size with margins difficult to fully ascertain. 2-62 35 x 50 mm previously 71 x 94 mm.     2-67 left periaortic adenopathy 25 x 17 mm  The SMA, celiac, and LIZZY are remain encased, diminished attenuation of these  vessels.      REPRODUCTIVE ORGANS: The prostate and seminal vesicles are unremarkable. URINARY  BLADDER: Unremarkable  BONES: No destructive bone lesion. ADDITIONAL COMMENTS: Resolved left inguinal pseudoaneurysm. Rohith Jaime RECIST        IMPRESSION:    Baseline research examination. Findings are consistent with interval response to therapy.      Continued diminished size of retroperitoneal mass-adenopathy,  with diminished soft tissue density at the left renal hilum.      Clinical Pain Assessment (nonverbal scale for nonver     CONTROLLED SUBSTANCES SAFETY ASSESSMENT (IF ON CONTROLLED SUBSTANCES):     Reviewed opioid safety handout:  [x] Yes   [] No  24 hour opioid dose >150mg morphine equivalent/day:  [] Yes   [x] No  Benzodiazepines:  [x] Yes   [] No  Sleep apnea:  [] Yes   [x] No  Urine Toxicology Testing within last 6 months:  [] Yes   [x] No  History of or new aberrant medication taking behaviors:  [] Yes   [x] No  Has Narcan been prescribed [x] Yes   [] No          Total time:   Counseling / coordination time:   > 50% counseling / coordination?:     Consent:  He and/or health care decision maker is aware that that he may receive a bill for this telehealth service, depending on his insurance coverage, and has provided verbal consent to proceed: Yes    CPT Codes 66660-74362 for Established Patients may apply to this Telehealth VisitTime-based coding, delete if not needed: I spent at least 25 minutes with this established patient, and >50% of the time was spent counseling and/or coordinating care regarding pain, anxiety; pharmacologic and non-pharmacologic interventions     Pursuant to the emergency declaration under the [address] and the [place] [address] waiver authority and the [employer] and [employer] Act, this Virtual  Visit was conducted, with patient's consent, to reduce the patient's risk of exposure to COVID-19 and provide continuity of care for an established patient. Services were provided through a video synchronous discussion virtually to substitute for in-person clinic visit.

## 2020-10-13 NOTE — PROGRESS NOTES
Corrie De Paz. Work  [phone]    Narrative / Assessment  SW met with patient in today's Virtual Visit with Betty Waggoner. Patient expressed on-going anxiety and angry outbursts at work, negative stressors related to illness, and positive stressors including a child on the way and managerial training at work. Patient amenable to hearing about Palliative Medicine social work--SW introduced self and Introduced Palliative Social Work as part of the supportive team through providing emotional support, resource referrals, advocacy, assistance with AMDs, and counseling. SW developed rapport with patient through providing functional examples of the usefulness of counseling. Plan  SW to provide patient a few days to consider whether he would like to pursue supportive counseling. SW to follow up this week.       Kelsy Mcfadden, [facility] [place]   Palliative Medicine   Social Work Supervisee for [address] [place] [address]  [phone]

## 2020-10-16 ENCOUNTER — TELEPHONE (OUTPATIENT)
Dept: PALLATIVE CARE | Age: 45
End: 2020-10-16

## 2020-10-16 NOTE — TELEPHONE ENCOUNTER
[employer]  Palliative Social Work  Dank Blaine Owens        Note:  SW returned patient call today. Patient interested in pursuing supportive counseling. Plan:  Patient to begin supportive counseling with this SW on October 29th.          SUZANNE Ramirez   Palliative Medicine   Social Work Supervisee for [address] [place] [address]  [phone]

## 2020-10-21 ENCOUNTER — OFFICE VISIT (OUTPATIENT)
Dept: PODIATRY | Age: 45
End: 2020-10-21
Payer: COMMERCIAL

## 2020-10-21 VITALS
HEIGHT: 67 IN | DIASTOLIC BLOOD PRESSURE: 82 MMHG | TEMPERATURE: 97.7 F | BODY MASS INDEX: 23.64 KG/M2 | SYSTOLIC BLOOD PRESSURE: 115 MMHG | WEIGHT: 150.6 LBS | HEART RATE: 76 BPM | OXYGEN SATURATION: 97 %

## 2020-10-21 DIAGNOSIS — L85.9 HYPERKERATOSIS: Primary | ICD-10-CM

## 2020-10-21 PROCEDURE — 99203 OFFICE O/P NEW LOW 30 MIN: CPT | Performed by: PODIATRIST

## 2020-10-21 PROCEDURE — 11056 PARNG/CUTG B9 HYPRKR LES 2-4: CPT | Performed by: PODIATRIST

## 2020-10-27 PROBLEM — L85.9 HYPERKERATOSIS: Status: ACTIVE | Noted: 2020-10-27

## 2020-10-27 NOTE — PROGRESS NOTES
[facility]    Subjective:         Patient is a 39 y.o. male who is being seen as a new pt for bilateral foot pain. Patient states she has been suffering the pain for the past few weeks. She states the pain is located where dense calluses have formed. She states the pain rises to the level of 8 out of 10. She states the pain is exacerbated with weightbearing. She denies any recent trauma. She denies any breaks in the skin. She denies any local/systemic signs of infection. She states she takes narcotics for chronic pain management as prescribed by pain management physician. She denies any other pedal complaints    Past Medical History:   Diagnosis Date    Anxiety     Cancer [facility])     lymphoma Nov 2018 receiving chemo    Chronic pain     lower back- lymphoma    Hyperlipidemia     Hypertension     Lymphadenopathy 11/12/2018     Past Surgical History:   Procedure Laterality Date    HX HEART CATHETERIZATION  02/2019    HX OTHER SURGICAL  02/2019    cardiac stent    IR INJECTION PSEUDOANEURYSM  2/26/2019       Family History   Problem Relation Age of Onset    Hypertension Father     Diabetes Father     Diabetes Mother       Social History     Tobacco Use    Smoking status: Current Every Day Smoker     Packs/day: 0.50     Years: 20.00     Pack years: 10.00    Smokeless tobacco: Never Used   Substance Use Topics    Alcohol use: No     Frequency: Never     No Known Allergies  Prior to Admission medications    Medication Sig Start Date End Date Taking? Authorizing Provider   lisinopriL (PRINIVIL, ZESTRIL) 40 mg tablet Take 1 Tab by mouth daily.  9/14/20  Yes Zo Blake NP   escitalopram oxalate (LEXAPRO) 20 mg tablet TAKE 1 TABLET BY MOUTH EVERY DAY 8/5/20  Yes Bentley Montalvo MD Klor-Con M20 20 mEq tablet TAKE 1 TABLET BY MOUTH TWICE A DAY 5/14/20  Yes Zo Blake NP   metoprolol succinate (TOPROL-XL) 50 mg XL tablet TAKE 1 TABLET BY MOUTH EVERY DAY 4/7/20 Yes Heriberto MOSER NP   atorvastatin (LIPITOR) 40 mg tablet TAKE 1 TAB BY MOUTH NIGHTLY. INDICATIONS: TREATMENT TO SLOW PROGRESSION OF CORONARY ARTERY DISEASE 3/31/20  Yes Heriberto MOSER NP   clopidogrel (PLAVIX) 75 mg tab 1 Tab by Per NG tube route daily. 2/18/19  Yes Jose Asif MD   oxyCODONE IR (ROXICODONE) 5 mg immediate release tablet Take 1 Tab by mouth every four (4) hours as needed for Pain for up to 30 days. Max Daily Amount: 30 mg. 11/4/20 12/4/20  Marcel Montalvo MD   oxyCODONE IR (ROXICODONE) 5 mg immediate release tablet Take 1 Tab by mouth every four (4) hours as needed for Pain for up to 30 days. Max Daily Amount: 30 mg. 12/4/20 1/3/21  Marcel Montalvo MD   oxyCODONE IR (ROXICODONE) 5 mg immediate release tablet Take 1 Tab by mouth every four (4) hours as needed for Pain for up to 30 days. Max Daily Amount: 30 mg. 1/3/21 2/2/21  Hernán Hawthorne MD   ondansetron hcl (ZOFRAN) 4 mg tablet Take 2 Tabs by mouth every eight (8) hours as needed for Nausea or Vomiting. 4/25/20   Babs Boone MD   naloxone [facility]) 4 mg/actuation nasal spray Use 1 spray intranasally, then discard. Repeat with new spray every 2 min as needed for opioid overdose symptoms, alternating nostrils. 1/8/20   Marcel Montalvo MD       Review of Systems   Eyes: Negative. Respiratory: Negative. Endocrine: Negative. Genitourinary: Negative. Musculoskeletal: Positive for arthralgias. Allergic/Immunologic: Negative. Neurological: Negative. Hematological: Negative. Psychiatric/Behavioral: Negative. All other systems reviewed and are negative. Objective:     Visit Vitals  /82 (BP 1 Location: Right arm, BP Patient Position: Sitting)   Pulse 76   Temp 97.7 °F (36.5 °C) (Temporal)   Ht 5' 7\" (1.702 m)   Wt 150 lb 9.6 oz (68.3 kg)   SpO2 97%   BMI 23.59 kg/m²       Physical Exam  Vitals signs reviewed. Constitutional:       Appearance: He is normal weight. Cardiovascular:      Pulses:           Dorsalis pedis pulses are 2+ on the right side and 2+ on the left side. Posterior tibial pulses are 2+ on the right side and 2+ on the left side. Pulmonary:      Effort: Pulmonary effort is normal.   Musculoskeletal:      Right lower leg: No edema. Left lower leg: No edema. Right foot: Normal range of motion. Prominent metatarsal heads present. No deformity. Left foot: Normal range of motion. Prominent metatarsal heads present. No deformity. Feet:      Right foot:      Protective Sensation: 10 sites tested. 10 sites sensed. Skin integrity: Callus present. Toenail Condition: Right toenails are normal.      Left foot:      Skin integrity: Callus present. Toenail Condition: Left toenails are normal.   Lymphadenopathy:      Lower Body: No right inguinal adenopathy. No left inguinal adenopathy. Skin:     General: Skin is warm. Capillary Refill: Capillary refill takes 2 to 3 seconds. Neurological:      Mental Status: He is alert and oriented to person, place, and time. Psychiatric:         Mood and Affect: Mood and affect normal.         Behavior: Behavior is cooperative. Data Review: No results found for this or any previous visit (from the past 24 hour(s)). Impression:       ICD-10-CM ICD-9-CM    1. Hyperkeratosis  L85.9 701.1          Recommendation:     Patient seen and evaluated in the office  Discussed educated patient regarding her current medical condition  A sharp excisional debridement was performed with a 15 blade down to the level of normal epidermis to multiple hyperkeratotic lesions (3) to the plantar aspect of both feet. Patient tolerated well and no dressing was needed.   Instructed patient she needs to limit focal pressure and shear forces to prevent the lesions from returning  Offloading felt metatarsal pads applied to shoe gear for symptomatic relief

## 2020-10-28 ENCOUNTER — TELEPHONE (OUTPATIENT)
Dept: PALLATIVE CARE | Age: 45
End: 2020-10-28

## 2020-10-28 NOTE — TELEPHONE ENCOUNTER
[facility]  Palliative Social Work  Telephone Call        Note:  SW left message reminding of supportive counseling appointment tomorrow. Plan:  SW to call tomorrow and to continue following.

## 2020-10-29 ENCOUNTER — TELEPHONE (OUTPATIENT)
Dept: PALLATIVE CARE | Age: 45
End: 2020-10-29

## 2020-10-29 NOTE — TELEPHONE ENCOUNTER
[address] [facility]  Palliative Social Work  Dank Owens      Note:  SW called patient to begin supportive counseling; no answer, mailbox full. Plan:  SW to try again later.            SUZANNE Hannah   Palliative Medicine   Supervisee in [facility]  [phone]

## 2020-11-05 ENCOUNTER — TELEPHONE (OUTPATIENT)
Dept: PALLATIVE CARE | Age: 45
End: 2020-11-05

## 2020-11-05 NOTE — TELEPHONE ENCOUNTER
[facility]  Palliative Social Work  Telephone Call      Time in: 12:15pm  Time out: 12:25pm    Note:  SW called patient in follow-up after missed counseling appointment last week. Patient reports he overslept and is having difficulty managing time between his appointments, his girlfriend's pregnancy appointments, and his work. Patient expressed continued interest in counseling. SW provided validation and reinforcement for this patient's effort and willingness to continue counseling. Patient unable to talk at this time (grocery shopping), SW to remain available and flexible to this patient's schedule. Plan:  ANTWAN to continue following.           Nicolas Sofia [facility] [place]   Palliative Medicine   Supervisee in Social Work  [employer] [address] [place] [address]  [phone]

## 2020-11-06 NOTE — TELEPHONE ENCOUNTER
Medication available for  at his pharmacy  starting 11/4/20.     Vaibhav Sosa RN  Palliative Medicine

## 2020-12-02 ENCOUNTER — TELEPHONE (OUTPATIENT)
Dept: PALLATIVE CARE | Age: 45
End: 2020-12-02

## 2020-12-02 DIAGNOSIS — M54.50 CHRONIC LEFT-SIDED LOW BACK PAIN WITHOUT SCIATICA: ICD-10-CM

## 2020-12-02 DIAGNOSIS — G89.29 CHRONIC LEFT-SIDED LOW BACK PAIN WITHOUT SCIATICA: ICD-10-CM

## 2020-12-02 NOTE — TELEPHONE ENCOUNTER
Triage for Controlled Substance Refill Request    Pain Diagnosis: Chronic left sided low back pain w/o sciatica    Last Outpatient Visit: 1013/2020    Next Outpatient Visit: 1/5/2021    Reason for refill needed outside of office visit? -Appointment not scheduled prior to need for scheduled refill    Pharmacy: [facility]/pharmacy [facility]    Medication: Oxycodone IR 5 mg   Dose and directions:  Take 1 tab by mouth Q 4 hrs   Number dispensed:80  Date filled ( or Pharmacy):11/6/2020  # left:8    Medication:  Dose and directions:  Number dispensed:  Date filled ( or Pharmacy):  #left:     reviewed: 12/2/2020    Date of Urine Drug Screen:  n/a    Opioid Safety Handout given:  yes    Appropriate for refill:  yes    Action:  medication pending

## 2020-12-03 RX ORDER — OXYCODONE HYDROCHLORIDE 5 MG/1
5 TABLET ORAL
Qty: 80 TAB | Refills: 0 | Status: SHIPPED | OUTPATIENT
Start: 2020-12-06 | End: 2021-01-05 | Stop reason: SDUPTHER

## 2020-12-07 ENCOUNTER — TELEPHONE (OUTPATIENT)
Dept: PALLATIVE CARE | Age: 45
End: 2020-12-07

## 2020-12-07 DIAGNOSIS — I10 ESSENTIAL HYPERTENSION: ICD-10-CM

## 2020-12-07 NOTE — TELEPHONE ENCOUNTER
Patient is calling stating that office needs to call insurance company and do a Prior Authorization for medication  Oxycodone. Advised would send to nurse to work on.

## 2020-12-07 NOTE — TELEPHONE ENCOUNTER
Prior authorization initiated. Prior authorization form completed and faxed to [facility]. Will await results, patient notified.

## 2020-12-08 RX ORDER — LISINOPRIL 40 MG/1
TABLET ORAL
Qty: 30 TAB | Refills: 1 | Status: SHIPPED | OUTPATIENT
Start: 2020-12-08 | End: 2022-04-20

## 2020-12-09 NOTE — PROGRESS NOTES
[address] [facility] at [address] [place] [address]  [phone]    Hematology / Oncology Established Visit    Reason for Visit:   Ed Pope is a 39 y.o. male who is seen by synchronous (real-time) audio-video technology on 12/10/2020 for follow up of lymphoma as well as nausea, vomiting. Hematology Oncology Treatment History:     Diagnosis: Follicular lymphoma    Stage: IV    Pathology:   11/13/18 right inguinal LN excision: Follicular lymphoma, high-grade (grade 3a of 3). Comment   The delaney architecture is entirely effaced by atypical lymphocytic proliferation with prominent nodular growth pattern. The majority of the atypical lymphocytes are small to medium in size and have irregular nuclear outlines and inconspicuous nucleoli, morphologically consistent with centrocytes. Scattered large lymphocytes with prominent nucleoli, consistent with centroblasts are identified (> 15 per high-power field). Focal increase in mitotic figures is noted. Occasional follicular dendritic cells are seen. By immunohistochemistry, the atypical lymphocytes are positive for CD20, PAX5, CD10, BCL6 and BCL2 (weak, focal) and negative for MUM1. CD3, CD5 and CD43 stain numerous background T lymphocytes. Ki-67 reveals a high proliferation index in the neoplastic follicles (overall 99-09%). Clinical history indicates 14 cm retroperitoneal mass with bilateral inguinal lymphadenopathy. In summary, the combined morphologic and phenotypic findings are diagnostic of a high-grade follicular lymphoma (grade 3a of 3). There is no evidence of diffuse large B-cell lymphoma. Flow cytometry analysis:   Monoclonal B-cell population (47 % of all cells) with mild increase in side and forward scatter properties expressing CD19, CD20, CD23 and CD10 with surface lambda light chain restriction. No phenotypically aberrant T-cell population. Flow cytometry was performed at [facility]     Prior Treatment:   1. Obinutuzumab-CHOP. Obinutuzumab: 1000 mg weekly on days 1, 8, 15 for cycle 1, then 1000 mg on day 1 q21 days for cycles 2-6, then monotherapy 1000 mg every 21 days for cycle 7, 8 with Cyclophosphamide 750mg/m2, Doxorubicin 50mg/m2, Vincristine 1.4mg/m2 on day 1 and Prednisone 100mg on Days 1-5, every 21 days for a total of 2 cycles completed late 1/2019. Regimen discontinued due to STEMI. 2. Obinutuzumab + Bendamustine: 1000 mg Obinutuzumab on day 1 + Bendamustine 90mg/m2 on days 1-2 on a 28-day cycle x 4 cycles, completed 5/2019    Current Treatment: Surveillance      Oncologic History:  He states he was in his usual state of health in early November 2018 when he developed low back pain and presented to the ER. The pain was described as constant, radiating to the buttocks, without exacerbating or alleviating factors, associated w/ increased urination, no n/v/d, no dysuria, no fever, no significant weight loss at first, but he did later report 15-lb loss over 1 month due to low appetite. Work-up at outside hospital revealed a retroperitoneal mass seen on CT imaging, and he was transferred to [facility] for further work-up. CT there showed a large retroperitoneal mass encircling the aorta with invasion of the left renal hilum and left adrenal gland. There were bilateral inguinal lymph nodes and moderate left hydronephrosis. He was evaluated while at [facility] and was noted to have palpable nodes in his groin for approximately the past 1 month. No testicular mass, anorexia, weight loss, fevers, night sweats, chest pain, shortness of breath, abdominal pain or nausea. He underwent excisional LN biopsy of right inguinal LN. He was told that he had likely lymphoma. He was advised to follow up as outpatient for PET scan, bone marrow biopsy. However, patient states he was lost to f/u. He never received any calls or appointment details.  His aunt urged patient to seek care elsewhere and his PCP recommended [facility]. At our first meeting on 12/13/18, he tells me that he was working full time, feeling well until early Nov 2018. When he developed the left lower back pain, he went to [facility] and [facility]. At time of diagnosis, he had no cardiac disease aside from HTN, hyperlipidemia. However, he did have an NSTEMI after cycle 2 of O-CHOP. Was likely unrelated to chemotherapy, but opted to switch treatment to Obinutuzumab-Bendamustine. He completed treatment in 5/2019 and had a CR based on PET. History of Present Illness:   Mr. Kelly Vernon is a 39 y.o. male with HTN is seen for follow up of Follicular lymphoma (now on surveillance). He was due for CT scan but did not go for this given waiting for insurance approval; now approved. He plans to schedule follow up with Dr. Shabbir Solorio. Reports loose stools every now and then. His potassium level is often low due to loose stools, he is trying to eat more bananas. Reports he continues to have anxiety, which pt is uncontrolled. He follows with Dr. Terry Lopez for management. He does not want to see a therapist. No recent fevers or infections. His chronic back pain is same as before and manageable. No new abdominal pain. No LAD. He is having a baby boy on 1/5/21! \"Pancho Aldrich\"     FAMILY HISTORY:  His father passed away from pancreatic cancer and also had a history of leukemia. Past Medical History:   Diagnosis Date    Anxiety     Cancer [facility])     lymphoma Nov 2018 receiving chemo    Chronic pain     lower back- lymphoma    Hyperlipidemia     Hypertension     Lymphadenopathy 11/12/2018      Past Surgical History:   Procedure Laterality Date    HX HEART CATHETERIZATION  02/2019    HX OTHER SURGICAL  02/2019    cardiac stent    IR INJECTION PSEUDOANEURYSM  2/26/2019      Social History     Tobacco Use    Smoking status: Current Every Day Smoker     Packs/day: 0.50     Years: 20.00     Pack years: 10.00    Smokeless tobacco: Never Used   Substance Use Topics    Alcohol use:  No Frequency: Never   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 Family History   Problem Relation Age of Onset    Hypertension Father     Diabetes Father     Diabetes Mother    Father had leukemia. Current Outpatient Medications   Medication Sig    lisinopriL (PRINIVIL, ZESTRIL) 40 mg tablet TAKE 1 TABLET BY MOUTH EVERY DAY    oxyCODONE IR (ROXICODONE) 5 mg immediate release tablet Take 1 Tab by mouth every four (4) hours as needed for Pain for up to 30 days. Max Daily Amount: 30 mg.    oxyCODONE IR (ROXICODONE) 5 mg immediate release tablet Take 1 Tab by mouth every four (4) hours as needed for Pain for up to 30 days. Max Daily Amount: 30 mg.    [START ON 1/3/2021] oxyCODONE IR (ROXICODONE) 5 mg immediate release tablet Take 1 Tab by mouth every four (4) hours as needed for Pain for up to 30 days. Max Daily Amount: 30 mg.    escitalopram oxalate (LEXAPRO) 20 mg tablet TAKE 1 TABLET BY MOUTH EVERY DAY    Klor-Con M20 20 mEq tablet TAKE 1 TABLET BY MOUTH TWICE A DAY    ondansetron hcl (ZOFRAN) 4 mg tablet Take 2 Tabs by mouth every eight (8) hours as needed for Nausea or Vomiting.  metoprolol succinate (TOPROL-XL) 50 mg XL tablet TAKE 1 TABLET BY MOUTH EVERY DAY    atorvastatin (LIPITOR) 40 mg tablet TAKE 1 TAB BY MOUTH NIGHTLY. INDICATIONS: TREATMENT TO SLOW PROGRESSION OF CORONARY ARTERY DISEASE    naloxone (NARCAN) 4 mg/actuation nasal spray Use 1 spray intranasally, then discard. Repeat with new spray every 2 min as needed for opioid overdose symptoms, alternating nostrils.  clopidogrel (PLAVIX) 75 mg tab 1 Tab by Per NG tube route daily. No current facility-administered medications for this visit. No Known Allergies     Review of Systems: A complete review of systems was obtained, negative except as described above.     Physical Exam:     Visit Vitals  /73   Pulse 76   Temp 97.4 °F (36.3 °C)   Resp 18   Ht 5' 7\" (1.702 m)   Wt 150 lb 3.2 oz (68.1 kg)   SpO2 100%   BMI 23.52 kg/m²     ECOG PS: 0  General: Well developed, no acute distress  Eyes: PERRLA, EOMI, anicteric sclerae  HENT: Atraumatic, OP clear, TMs intact without erythema  Neck: Supple  Lymphatic: No cervical, supraclavicular, axillary or inguinal adenopathy  Respiratory: CTAB, normal respiratory effort  CV: Normal rate, regular rhythm, no murmurs, no peripheral edema  GI: Soft, nontender, nondistended, no masses, no hepatomegaly, no splenomegaly  MS: Normal gait and station. Digits without clubbing or cyanosis. Skin: No rashes, ecchymoses, or petechiae. Normal temperature, turgor, and texture. Neuro/Psych: Alert, oriented. 5/5 strength in all 4 extremities. Appropriate affect, normal judgment/insight. Results:     Lab Results   Component Value Date/Time    WBC 7.6 12/10/2020 11:19 AM    HGB 14.2 12/10/2020 11:19 AM    HCT 40.7 12/10/2020 11:19 AM    PLATELET 815 35/21/3641 11:19 AM    .3 (H) 12/10/2020 11:19 AM    ABS. NEUTROPHILS 5.8 12/10/2020 11:19 AM    Hemoglobin (POC) 15.0 06/05/2009 02:13 PM    Hematocrit (POC) 39 02/14/2019 01:24 PM     Lab Results   Component Value Date/Time    Sodium 141 09/17/2020 11:32 AM    Potassium 3.1 (L) 09/17/2020 11:32 AM    Chloride 109 (H) 09/17/2020 11:32 AM    CO2 27 09/17/2020 11:32 AM    Glucose 70 09/17/2020 11:32 AM    BUN 5 (L) 09/17/2020 11:32 AM    Creatinine 1.12 09/17/2020 11:32 AM    GFR est AA >60 09/17/2020 11:32 AM    GFR est non-AA >60 09/17/2020 11:32 AM    Calcium 8.8 09/17/2020 11:32 AM    Sodium (POC) 136 02/14/2019 01:24 PM    Potassium (POC) 3.9 02/14/2019 01:24 PM    Chloride (POC) 102 02/14/2019 01:24 PM    Glucose (POC) 249 (H) 02/15/2019 10:21 PM    BUN (POC) 14 02/14/2019 01:24 PM    Creatinine (POC) 0.9 02/14/2019 01:24 PM    Calcium, ionized (POC) 1.24 02/14/2019 01:24 PM     Lab Results   Component Value Date/Time    Bilirubin, total 0.6 09/17/2020 11:32 AM    ALT (SGPT) 39 09/17/2020 11:32 AM    Alk.  phosphatase 131 (H) 09/17/2020 11:32 AM Protein, total 6.7 2020 11:32 AM    Albumin 3.5 2020 11:32 AM    Globulin 3.2 2020 11:32 AM     No results found for: IRON, FE, TIBC, IBCT, PSAT, FERR    No results found for: B12LT, FOL, RBCF  Lab Results   Component Value Date/Time    TSH 1.53 2016 04:40 AM     18:     Lab Results   Component Value Date/Time    Hepatitis A, IgM NONREACTIVE 2018 04:53 PM    Hepatitis B surface Ag <0.10 2018 04:53 PM    Hepatitis B core, IgM NONREACTIVE 2018 04:53 PM         Imagin/9/18 Abd/pelvis CT: IMPRESSION:  1. Interval development of a large retroperitoneal mass encircling the aorta with invasion of the left renal hilum and left adrenal gland. Several adjacent lymph nodes are seen extending into the peritoneum and underneath the  diaphragmatic natalie. This most likely represents lymphoma. 2. Several new bilateral enlarged inguinal lymph nodes also likely representing lymphoma. 3. Moderate left hydronephrosis with a delayed renal nephrogram related to decreased renal function. This is related to the invasion of the renal hilum. 18 Chest CT: IMPRESSION:  Trace left pleural effusion. Bilateral lower lobe atelectasis. Large  retroperitoneal mass lesion again demonstrated. PET 18:  FINDINGS:  HEAD/NECK: Right palatine tonsil intense hypermetabolism, max SUV 18. Multilevel  bilateral cervical adenopathy, with max SUV 12 in a left supraclavicular node. Cerebral evaluation is limited by normal intense activity. CHEST: Solitary hypermetabolic left axillary node, max SUV 11. ABDOMEN/PELVIS: Bulky retroperitoneal mass max SUV 27, with several additional  small active abdomino-pelvic nodes. Bilateral inguinal nodes with max SUV 12 on  the left. SKELETON: No foci of abnormal hypermetabolism in the axial and visualized  appendicular skeleton. IMPRESSION:   1. Right palatine tonsil tumor involvement (Deauville 5).   2. Bilateral cervical delaney involvement (Deauville 5). 3. Left axillary node involvement (Deauville 5). 4. Bulky retroperitoneal lymphoma mass and additional smaller hypermetabolic  abdomino-pelvic nodes (Deauville 5). 5. Bilateral inguinal delaney involvement (Deauville 5). Deauville Five Point Scale  1. No uptake or no residual uptake (when used interim)  2. Slight uptake, but below blood pool (mediastinum)  3. Uptake above mediastinal, but below/equal to uptake in the liver  4. Uptake slightly to moderately higher than liver  5. Markedly increased uptake or any new lesion (on response evaluation)  Each FDG-avid (or previously FDG avid) lesion is rated independently. Reference values:  Mediastinal blood pool: 2.1 SUV  Liver (background): 2.2 SUV    PET/CT 2/05/19:   IMPRESSION:  1. No Foci of Abnormal Hypermetabolism (Deauville 1). 2. Resolved activity in the right palatine tonsil, bilateral cervical nodes,left axillary node, retroperitoneal/abdominal pelvic adenopathy, bilateral inguinal nodes. Echo 2/14/19:  Normal cavity size, wall thickness and systolic function (ejection fraction normal). The muscle mass is normal. The cavity shape is normal. The estimated ejection fraction is 41 - 45%. Abnormal wall motion as described on the wall scoring diagram below. End-systolic volume is normal. Normal left ventricular strain. There is mild (grade 1) left ventricular diastolic dysfunction. Normal left ventricular diastolic pressure. End-diastolic volume is normal.    LE arterial duplex 2/22/19:  There is evidence of left groin pseudoaneurysm noted arising from distal common femoral artery, pseudo lobe measures 2.32cm x 2.58cm and pseudo neck length measuring 0.63cm. There is no evidence of hemodynamically significant left lower extremity arterial obstruction. JACLYN is 1.03 on the right and 1.02 on the left.        LE arterial duplex s/p Thrombin Injection to Pseudoaneurysm 2/26/19:  Successful thrombin injection procedure of the left groin with no further flow seen. No evidence of hemodnyamically significant obstruction in the left lower extremity. Left lower extremity arterial duplex performed. Confirmed pseudoaneurysm in left groin with small neck. Following thrombin injection, no further flow seen in the pseudoaneurysm. The left common femoral, profunda femoral, femoral, popliteal, posterior tibial and anterior arteries were imaged. Mainly triphasic flow was seen with no evidence of significantly elevated velocities. Repeat LE arterial duplex 2/27/19:  Continued thrombosed left groin pseudoaneurysm following thrombin injection on 02/26/2019. No flow or color fill is identified. The hematoma measures approximately 2.1 x 2.9 cm in diameter. The common femoral, deep femoral, femoral, and popliteal arteries are patent with mainly tri-phasic flow and no significant hemodynamically obstruction is noted. Stress 5/31/19:  · Normal stress myocardial perfusion without ischemia or infact at 84% MPHR. Normal LV function. LVEF 60%. · No EKG changes of ischemia at peak exercise. · Normal functional capacity. PET 6/03/19: IMPRESSION: No Foci of Abnormal Hypermetabolism (Deauville 1). -MRI lumbar spine 12/18/19:  Mild disc degenerative change at L3-4 and L4-5. Mild canal stenosis at L3-4 and mild left foraminal stenosis at L4-5. Other less severe degenerative findings are as described above. Continued diminished size of retroperitoneal mass-adenopathy,  with diminished soft tissue density at the left renal hilum. -CT chest/abdomen 12/16/19:  Findings are consistent with interval response to therapy    CT c/a/p 5/28/20:   IMPRESSION:  Further contraction of the retroperitoneal mantle and chris mesentery,  compatible with treatment response  Stable left hilar soft tissue mass  Slight increased splaying of the duodenum and proximal jejunum is the result, without obstruction  No evidence for recurrence of lymphoma in the chest, abdomen, or pelvis       Assessment & Plan:   Taina See is a 39 y.o. male comes in for evaluation and management of lymphoma. 1. Follicular lymphoma: Grade 3a. Although grade 3a disease is considered more indolent and can be treated like grade 1/2 disease, this patient has indications for treatment: bulky disease encircling the aorta causing symptoms. Bone marrow negative for lymphoma, but was hypercellular. BR better than RCHOP, but based on GALLIUM study, Obinutuzumab-based induction and maintenance prolongs PFS over that seen with rituximab-based therapy. Therefore, I have chosen to treat patient with O-CHOP regimen for 6 cycles, followed by possible maintenance Obinutuzumab. We discussed the risks and benefits of O-CHOP chemotherapy. The potential side effects include, but are not limited to: nausea, vomiting, diarrhea, constipation, Flu-like symptoms, infusion reaction, allergic reaction, flushing, taste changes, increased risk of infection, anemia, fatigue, alopecia, neuropathy, nail changes, cardiac damage, mucositis, myleosuppression, infertility and rarely, death. Patient completed chemoteaching and received a chemotherapy education folder. CR after 2 cycles of O-CHOP, but switched regiment Bendamustine-O due to cardiac event. Chemotherapy consent signed and patient educated about side effects including: cytopenias, infections, bleeding, n/v/d, hair loss, skin rash, secondary malignancy, kidney or liver toxicity. Completed a total of 4 cycles of Bendamustine-O, completed 5/2019. PET completed 6/3/19 showed CR. CT 5/28/20 negative for disease. -- Surveillance labs, MD visit every 3 months, and CT imaging q6mo x 2 yrs; 5/2021 will be 2 years. -- Can remove port NOW. -- Repeat CT due NOW. -- Follow up in 6 months labs, MD visit. 2. Chronic lumbar back pain/anxiety: Left lower back pain is chronic and stable currently. Signed pain contract on 12/28/18 and following with Dr. Sanjana Ramirez. -- Oxycodone 5 mg PRN prescribed by Dr. Maddy Salcido. -- Lexapro 20mg daily for anxiety. 3. CAD: h/o STEMI 2/14/29 with TOM placed to proximal LAD. Following with Dr. Anthony Boykin and remains on dual antiplatelet therapy, high dose Lipitor, Metoprolol, ACEI. Received only 2 doses of cardiotoxic chemo, Doxorubicin in early 1/2019.  -- Follows with Dr. Anthony Boykin in 1 year- this is not scheduled in our system, asked patient to reach out to schedule. 4. Tobacco abuse: Discussed benefits of quitting smoking again. Discussed Chantix again which helped in the past. Discussed replacing hand-to-mouth habit with another item such as Twizzlers or SlimJims. Pt to retry Chantix. No SI/HI. 5. Loose stools: Intermittent. Advised metamucil 1-2 times per week. Emotional well being: Pt is coping well with his/her disease and has excellent support. Met with SW in the past as well as today. I appreciate the opportunity to participate in Mr. Citlalli Partida Sr.'s care. I have personally seen and evaluated the patient in conjunction with Meghan Cabrera NP. I find the patient's history and physical exam are consistent with the NP's documentation. I agree with the above assessment and plan, which I have edited if needed.       Signed By: Adilene Sanchez MD     December 10, 2020

## 2020-12-09 NOTE — TELEPHONE ENCOUNTER
Call placed to [facility] for update, per representative incorrect form used.  Representative to fax correct blank form for completion

## 2020-12-10 ENCOUNTER — HOSPITAL ENCOUNTER (OUTPATIENT)
Dept: INFUSION THERAPY | Age: 45
Discharge: HOME | End: 2020-12-10
Payer: COMMERCIAL

## 2020-12-10 ENCOUNTER — TELEPHONE (OUTPATIENT)
Dept: ONCOLOGY | Age: 45
End: 2020-12-10

## 2020-12-10 ENCOUNTER — OFFICE VISIT (OUTPATIENT)
Dept: ONCOLOGY | Age: 45
End: 2020-12-10
Payer: COMMERCIAL

## 2020-12-10 VITALS
DIASTOLIC BLOOD PRESSURE: 73 MMHG | HEART RATE: 76 BPM | TEMPERATURE: 97.4 F | OXYGEN SATURATION: 100 % | RESPIRATION RATE: 18 BRPM | SYSTOLIC BLOOD PRESSURE: 126 MMHG

## 2020-12-10 VITALS
BODY MASS INDEX: 23.57 KG/M2 | RESPIRATION RATE: 18 BRPM | HEART RATE: 76 BPM | SYSTOLIC BLOOD PRESSURE: 126 MMHG | OXYGEN SATURATION: 100 % | HEIGHT: 67 IN | DIASTOLIC BLOOD PRESSURE: 73 MMHG | TEMPERATURE: 97.4 F | WEIGHT: 150.2 LBS

## 2020-12-10 DIAGNOSIS — Z71.6 TOBACCO ABUSE COUNSELING: ICD-10-CM

## 2020-12-10 DIAGNOSIS — Z85.72 HISTORY OF FOLLICULAR LYMPHOMA: Primary | ICD-10-CM

## 2020-12-10 DIAGNOSIS — R19.7 DIARRHEA, UNSPECIFIED TYPE: ICD-10-CM

## 2020-12-10 DIAGNOSIS — E87.6 HYPOKALEMIA: ICD-10-CM

## 2020-12-10 DIAGNOSIS — G89.29 CHRONIC BILATERAL LOW BACK PAIN WITHOUT SCIATICA: ICD-10-CM

## 2020-12-10 DIAGNOSIS — D75.89 MACROCYTOSIS WITHOUT ANEMIA: Primary | ICD-10-CM

## 2020-12-10 DIAGNOSIS — C82.90 FOLLICULAR LYMPHOMA, UNSPECIFIED FOLLICULAR LYMPHOMA TYPE, UNSPECIFIED BODY REGION (HCC): Primary | ICD-10-CM

## 2020-12-10 DIAGNOSIS — M54.50 CHRONIC BILATERAL LOW BACK PAIN WITHOUT SCIATICA: ICD-10-CM

## 2020-12-10 LAB
ALBUMIN SERPL-MCNC: 3.5 G/DL (ref 3.5–5)
ALBUMIN/GLOB SERPL: 1.1 {RATIO} (ref 1.1–2.2)
ALP SERPL-CCNC: 106 U/L (ref 45–117)
ALT SERPL-CCNC: 20 U/L (ref 12–78)
ANION GAP SERPL CALC-SCNC: 2 MMOL/L (ref 5–15)
AST SERPL-CCNC: 10 U/L (ref 15–37)
BASO+EOS+MONOS # BLD AUTO: 0.6 K/UL (ref 0.2–1.2)
BASO+EOS+MONOS NFR BLD AUTO: 7 % (ref 3.2–16.9)
BILIRUB SERPL-MCNC: 0.4 MG/DL (ref 0.2–1)
BUN SERPL-MCNC: 11 MG/DL (ref 6–20)
BUN/CREAT SERPL: 10 (ref 12–20)
CALCIUM SERPL-MCNC: 8.6 MG/DL (ref 8.5–10.1)
CHLORIDE SERPL-SCNC: 110 MMOL/L (ref 97–108)
CO2 SERPL-SCNC: 27 MMOL/L (ref 21–32)
CREAT SERPL-MCNC: 1.06 MG/DL (ref 0.7–1.3)
DIFFERENTIAL METHOD BLD: ABNORMAL
ERYTHROCYTE [DISTWIDTH] IN BLOOD BY AUTOMATED COUNT: 16 % (ref 11.8–15.8)
GLOBULIN SER CALC-MCNC: 3.1 G/DL (ref 2–4)
GLUCOSE SERPL-MCNC: 149 MG/DL (ref 65–100)
HCT VFR BLD AUTO: 40.7 % (ref 36.6–50.3)
HGB BLD-MCNC: 14.2 G/DL (ref 12.1–17)
LDH SERPL L TO P-CCNC: 141 U/L (ref 85–241)
LYMPHOCYTES # BLD: 1.2 K/UL (ref 0.8–3.5)
LYMPHOCYTES NFR BLD: 16 % (ref 12–49)
MCH RBC QN AUTO: 35.7 PG (ref 26–34)
MCHC RBC AUTO-ENTMCNC: 34.9 G/DL (ref 30–36.5)
MCV RBC AUTO: 102.3 FL (ref 80–99)
NEUTS SEG # BLD: 5.8 K/UL (ref 1.8–8)
NEUTS SEG NFR BLD: 77 % (ref 32–75)
PLATELET # BLD AUTO: 202 K/UL (ref 150–400)
POTASSIUM SERPL-SCNC: 3.1 MMOL/L (ref 3.5–5.1)
PROT SERPL-MCNC: 6.6 G/DL (ref 6.4–8.2)
RBC # BLD AUTO: 3.98 M/UL (ref 4.1–5.7)
SODIUM SERPL-SCNC: 139 MMOL/L (ref 136–145)
WBC # BLD AUTO: 7.6 K/UL (ref 4.1–11.1)

## 2020-12-10 PROCEDURE — 36591 DRAW BLOOD OFF VENOUS DEVICE: CPT

## 2020-12-10 PROCEDURE — 80053 COMPREHEN METABOLIC PANEL: CPT

## 2020-12-10 PROCEDURE — 77030016057 HC NDL HUBR APOL -B

## 2020-12-10 PROCEDURE — 99214 OFFICE O/P EST MOD 30 MIN: CPT | Performed by: INTERNAL MEDICINE

## 2020-12-10 PROCEDURE — 85025 COMPLETE CBC W/AUTO DIFF WBC: CPT

## 2020-12-10 PROCEDURE — 83615 LACTATE (LD) (LDH) ENZYME: CPT

## 2020-12-10 PROCEDURE — 74011250636 HC RX REV CODE- 250/636: Performed by: NURSE PRACTITIONER

## 2020-12-10 RX ORDER — PROCHLORPERAZINE MALEATE 10 MG
1 TABLET ORAL
COMMUNITY
End: 2022-04-15

## 2020-12-10 RX ORDER — AMOXICILLIN 250 MG
1 CAPSULE ORAL
COMMUNITY
End: 2022-04-15

## 2020-12-10 RX ORDER — LORAZEPAM 0.5 MG/1
1 TABLET ORAL
COMMUNITY
End: 2022-04-20

## 2020-12-10 RX ORDER — SODIUM CHLORIDE 0.9 % (FLUSH) 0.9 %
5-10 SYRINGE (ML) INJECTION AS NEEDED
Status: DISPENSED | OUTPATIENT
Start: 2020-12-10 | End: 2020-12-10

## 2020-12-10 RX ORDER — PREDNISONE 20 MG/1
TABLET ORAL
COMMUNITY
End: 2022-04-20

## 2020-12-10 RX ORDER — HEPARIN 100 UNIT/ML
500 SYRINGE INTRAVENOUS AS NEEDED
Status: ACTIVE | OUTPATIENT
Start: 2020-12-10 | End: 2020-12-10

## 2020-12-10 RX ORDER — SODIUM CHLORIDE 9 MG/ML
10 INJECTION INTRAMUSCULAR; INTRAVENOUS; SUBCUTANEOUS AS NEEDED
Status: ACTIVE | OUTPATIENT
Start: 2020-12-10 | End: 2020-12-10

## 2020-12-10 RX ORDER — POTASSIUM CHLORIDE 20 MEQ/1
20 TABLET, EXTENDED RELEASE ORAL DAILY
Qty: 30 TAB | Refills: 1 | Status: SHIPPED | OUTPATIENT
Start: 2020-12-10 | End: 2021-02-12 | Stop reason: DRUGHIGH

## 2020-12-10 RX ADMIN — HEPARIN 500 UNITS: 100 SYRINGE at 11:40

## 2020-12-10 RX ADMIN — Medication 10 ML: at 11:40

## 2020-12-10 RX ADMIN — SODIUM CHLORIDE 10 ML: 9 INJECTION INTRAMUSCULAR; INTRAVENOUS; SUBCUTANEOUS at 11:41

## 2020-12-10 NOTE — TELEPHONE ENCOUNTER
12/10/20 3:35 PM Mailbox is full. Called patient to notify labs. Needs to start on potassium supplements 20 meq once daily and would like him to get vitamin b12 and folate checked. Will try calling again later.

## 2020-12-10 NOTE — PROGRESS NOTES
Chief Complaint   Patient presents with   Shaun Solorio is a pleasant 39year old male who presents as a follow up for follicular lymphoma. He reports pain in his lower left side of his lower back today.

## 2020-12-10 NOTE — PROGRESS NOTES
I tried calling patient but VM is full. Please try calling him tomorrow. Notify patient his potassium is low. He is not anemic but the size of his RBC are large so he may be vitamin b12 of folate deficient. Advise he start taking potassium 20 meq one tab daily. Continue eating bananas. I would like him to get vitamin B12 and folate labs checked in the next few weeks. Mail lab slip. Also, notify him his BG level is elevated at 149.  He needs to establish care with PCP so they can check him for diabetes, cholesterol etc.

## 2020-12-10 NOTE — PROGRESS NOTES
Outpatient Infusion Center   Visit Progress Note    Patient admitted to [facility] for [facility] Flush/Labs ambulatory in stable condition. No acute concerns voiced. Visit Vitals  /73 (BP 1 Location: Left arm, BP Patient Position: At rest)   Pulse 76   Temp 97.4 °F (36.3 °C)   Resp 18   SpO2 100%       Right chest PAC accessed with 20G 0.75 inch [place], with positive blood return. Medications:  NS Flush  Heparin Flush    Labs pending in CC. Patient proceeded to appointment with Dr. Melania Flores. Patient tolerated treatment well. Patient discharged from [place] [address] ambulatory in no distress. Patient aware of next appointment.     Future Appointments   Date Time Provider Dahlia Epps   1/5/2021 10:30 AM Sean Siddiqui MD [facility] BS AMB   3/4/2021 11:00 AM SS INF7 CH2 <1H [facility]   5/27/2021 11:00 AM SS INF7 CH2 <1H [facility]   8/19/2021 11:00 AM SS INF7 CH2 <1H [facility] [address] [place] [address]

## 2020-12-11 ENCOUNTER — TRANSCRIBE ORDER (OUTPATIENT)
Dept: SCHEDULING | Age: 45
End: 2020-12-11

## 2020-12-14 DIAGNOSIS — D75.89 MACROCYTOSIS WITHOUT ANEMIA: Primary | ICD-10-CM

## 2020-12-14 DIAGNOSIS — C82.33 FOLLICULAR LYMPHOMA GRADE IIIA OF INTRA-ABDOMINAL LYMPH NODES (HCC): ICD-10-CM

## 2020-12-14 NOTE — PROGRESS NOTES
Attempted to call patient via home/cell number listed regarding his lab results. No answer and voicemail is currently full. Will attempt again later if no return call. Mailed lab order as well.

## 2020-12-14 NOTE — TELEPHONE ENCOUNTER
Prior auth for Oxycodone IR 5 mg approved 12/11/2020 - 6/8/2021.  Will have pharmacy re run script, and notify patient

## 2020-12-15 ENCOUNTER — TRANSCRIBE ORDER (OUTPATIENT)
Dept: SCHEDULING | Age: 45
End: 2020-12-15

## 2020-12-15 DIAGNOSIS — C82.33 FOLLICULAR LYMPHOMA GRADE IIIA OF INTRA-ABDOMINAL LYMPH NODES (HCC): Primary | ICD-10-CM

## 2020-12-15 NOTE — PROGRESS NOTES
Attempted to call patient again via home/cell number listed. No answer and voicemail is currently full. This nurse has attempted to call patient x 2 and Laura Wilkinson, has attempted to call patient with no return call as of yet. No other contact number listed and patient not MyChart enabled. Will notify NP and mail letter.

## 2021-01-05 ENCOUNTER — VIRTUAL VISIT (OUTPATIENT)
Dept: PALLATIVE CARE | Age: 46
End: 2021-01-05
Payer: COMMERCIAL

## 2021-01-05 DIAGNOSIS — F32.9 REACTIVE DEPRESSION: ICD-10-CM

## 2021-01-05 DIAGNOSIS — G89.29 CHRONIC LEFT-SIDED LOW BACK PAIN WITHOUT SCIATICA: Primary | ICD-10-CM

## 2021-01-05 DIAGNOSIS — C82.33 FOLLICULAR LYMPHOMA GRADE IIIA OF INTRA-ABDOMINAL LYMPH NODES (HCC): ICD-10-CM

## 2021-01-05 DIAGNOSIS — R63.0 POOR APPETITE: ICD-10-CM

## 2021-01-05 DIAGNOSIS — R53.83 OTHER FATIGUE: ICD-10-CM

## 2021-01-05 DIAGNOSIS — F41.9 ANXIETY: ICD-10-CM

## 2021-01-05 DIAGNOSIS — M54.50 CHRONIC LEFT-SIDED LOW BACK PAIN WITHOUT SCIATICA: Primary | ICD-10-CM

## 2021-01-05 DIAGNOSIS — E87.6 HYPOKALEMIA: ICD-10-CM

## 2021-01-05 PROCEDURE — 99244 OFF/OP CNSLTJ NEW/EST MOD 40: CPT | Performed by: INTERNAL MEDICINE

## 2021-01-05 RX ORDER — OXYCODONE HYDROCHLORIDE 5 MG/1
5 TABLET ORAL
Qty: 80 TAB | Refills: 0 | Status: SHIPPED | OUTPATIENT
Start: 2021-02-01 | End: 2021-03-03

## 2021-01-05 RX ORDER — OXYCODONE HYDROCHLORIDE 5 MG/1
5 TABLET ORAL
Qty: 80 TAB | Refills: 0 | Status: SHIPPED | OUTPATIENT
Start: 2021-03-03 | End: 2021-04-02

## 2021-01-05 RX ORDER — OXYCODONE HYDROCHLORIDE 5 MG/1
5 TABLET ORAL
Qty: 80 TAB | Refills: 0 | Status: SHIPPED | OUTPATIENT
Start: 2021-04-02 | End: 2021-04-06 | Stop reason: SDUPTHER

## 2021-01-05 RX ORDER — GUAIFENESIN 100 MG/5ML
81 LIQUID (ML) ORAL DAILY
COMMUNITY
End: 2022-04-15

## 2021-01-05 RX ORDER — METOPROLOL SUCCINATE 50 MG/1
TABLET, EXTENDED RELEASE ORAL
Qty: 90 TAB | Refills: 0 | Status: SHIPPED | OUTPATIENT
Start: 2021-01-05 | End: 2022-04-20

## 2021-01-05 NOTE — PROGRESS NOTES
Palliative Medicine Outpatient Services  [place]: [phone] ([phone])    Patient Name: Naomi Jackson. YOB: 1975    Date of Current Visit: 01/05/21  Location of Current Visit:    [] [facility] Office  [] [facility] Office  [] [address] [place] [address]  [] Home  [x] Other:  televisit    Date of Initial Visit: 2/19/19   Referral from: Mesfin Ventura MD  Primary Care Physician: None      SUMMARY:   Naomi Laird is a 39y.o. year old with high-grade follicular lymphoma, who was referred to Palliative Medicine by Dr. Michael Chavis for symptom management and supportive care. He was diagnosed in 11/2018 after presenting with back pain. He is currently receiving systemic chemotherapy. He suffered cardiac arrest during cycle #3 due to previously undiagnosed severe stenosis of the LAD s/p PTCA and stent. He has since completed systemic chemotherapy. His most recent PET-CT (5/2019) showed no focal areas of increased hypermetabolic uptake. The patients social history includes: he is single. He lives in [place]. He has 3 teenage children who live with their mother and with whom he has close contact. 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 He's applied for disability. Palliative Medicine is addressing the following current patient/family concerns: anxiety, depression, left mid- and low back pain, poor appetite, fatigue, advanced care planning. Initial Referral Intake note from Mel Neri RN reviewed prior to visit   PALLIATIVE DIAGNOSES:       ICD-10-CM ICD-9-CM    1. Chronic left-sided low back pain without sciatica  M54.5 724.2 oxyCODONE IR (ROXICODONE) 5 mg immediate release tablet    G89.29 338.29 oxyCODONE IR (ROXICODONE) 5 mg immediate release tablet      oxyCODONE IR (ROXICODONE) 5 mg immediate release tablet   2. Anxiety  F41.9 300.00    3. Reactive depression  F32.9 300.4    4. Other fatigue  R53.83 780.79    5. Poor appetite  R63.0 783.0    6. Hypokalemia  E87.6 276.8    7.  Follicular lymphoma grade IIIa of intra-abdominal lymph nodes [facility])  C82.33 202.03           PLAN:   Patient Instructions       Dear Berny Dewey. ,    It was a pleasure seeing you today via televisit. We will see you again in 12 weeks for a televisit. Congratulations on the arrival of jacinta Granda Bhavik! If labs or imaging tests have been ordered for you today, please call the office at [phone] 48 hours after completion to obtain the results. Your described symptoms were: Fatigue: 5 Drowsiness: 2   Depression: 0 Pain: 5   Anxiety: 9 Nausea: 0   Anorexia: 0 Dyspnea: 0   Best Well-Bein Constipation: Yes   Other Problem (Comment): 0       This is the plan we talked about:      1. Back pain   -Continue oxycodone 5-mg every 4 hours as needed  -Today I sent another 3 1-month oxycodone prescriptions (#80) to your pharmacy for pick-up on , 3/3,   -Continue tylenol as needed for moderate pain  -Continue heat or ice as needed  -Avoid taking ibuprofen, naprosyn or other any over-the-counter pain relievers which may interact with your blood thinner. 2. Anxiety/depression  -Continue escitalopram to 20-mg daily.  -Continue using deep breathing techniques for your anxiety  -Our , Esther Hicks, is available to you for supportive counseling    3. Fatigue  -This is chronic and unchanged  -You continue to work full-time as a manager at [employer]    4. Poor appetite  -You're eating and you haven't lost any weight    5. Low potassium  -You saw DR. Bowers last month and she prescribed potassium pills  -You've been taking these regularly    6.  Lymphoma  -You saw Dr. Ana Branch last month  -Eddy Mcardle going to have port removed!  -You anticipate having repeat scans later this month         This is what you have shared with us about Advance Care Planning:      Primary Decision Maker: Chely Iverson - Ex-Spouse - [phone]  [] Named in a scanned document   [x] Legal Next of Kin  [] Guardian    ACP documents you current have include:  [] Advance Directive or Living Will  [] Durable Do Not Resuscitate  [] Physician Orders for Scope of Treatment (POST)  [] Medical Power of   [] Other      The Palliative Medicine Team is here to support you and your family. Sincerely,      Milo Castañeda MD and the Palliative Medicine Team                Counseling and Coordination:        GOALS OF CARE / TREATMENT PREFERENCES:   [====Goals of Care====]  GOALS OF CARE:  Patient / health care proxy stated goals: See Patient Instructions / Summary    TREATMENT PREFERENCES:   Code Status:  [x] Attempt Resuscitation       [] Do Not Attempt Resuscitation    Advance Care Planning:  [x] [facility] Team has updated the ACP Navigator with Decision Maker and Patient Capacity      Primary Decision Maker: [de-identified] - Ex-Spouse - [phone]  [] Named in a scanned document   [x] Legal Next of Kin  [] Guardian    Other:  (If patient appropriate for POST, consider using PALLPOST smart phrase here)    The palliative care team has discussed with patient / health care proxy about goals of care / treatment preferences for patient.  [====Goals of Care====]     PRESCRIPTIONS GIVEN:     Medications Ordered Today   Medications    oxyCODONE IR (ROXICODONE) 5 mg immediate release tablet     Sig: Take 1 Tab by mouth every four (4) hours as needed for Pain for up to 30 days. Max Daily Amount: 30 mg. Dispense:  80 Tab     Refill:  0    oxyCODONE IR (ROXICODONE) 5 mg immediate release tablet     Sig: Take 1 Tab by mouth every four (4) hours as needed for Pain for up to 30 days. Max Daily Amount: 30 mg. Dispense:  80 Tab     Refill:  0    oxyCODONE IR (ROXICODONE) 5 mg immediate release tablet     Sig: Take 1 Tab by mouth every four (4) hours as needed for Pain for up to 30 days. Max Daily Amount: 30 mg.      Dispense:  80 Tab     Refill:  0           FOLLOW UP:     Future Appointments   Date Time Provider Dahlia Epps 3/4/2021 11:00 [facility]   5/27/2021 11:00 AM [facility]   6/10/2021 10:30 AM Ebony Bowers MD [facility] AMB   8/19/2021 11:00 AM SS INF7 CH2 <1H [facility]           PHYSICIANS INVOLVED IN CARE:   Patient Care Team:  None as PCP - Erika Low MD (Hematology and Oncology)  Fabrizio Lynch MD as Physician (Palliative Medicine)  Fabrizio Lynch MD as Physician (Palliative Medicine)       HISTORY:   Reviewed patient-completed ESAS and advance care planning form. Reviewed patient record in prescription monitoring program.    CHIEF COMPLAINT:   Chief Complaint   Patient presents with    Back Pain       HPI/SUBJECTIVE:    The patient is: [x] Verbal / [] Nonverbal       His son, Carlos Alberto Kenney, was born last Wednesday! He's healthy and tres. He has constant back pain, worse on days he works, but nothing new. He uses oxycodone on days he works. Anxiety is his biggest problem. Now that he's not taking Xanax or Ativan anymore, he deals with this by taking deep breaths when it happens. He continues to take Lexapro. He's now a manager at [employer]. 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 He's eating (doesn't always have an appetite.)    He's moving his bowels regularly. He saw Dr. Demar Izquierdo last month. His potassium level was still low. She prescribed potassium pills which he's been taking. He's getting his port out! He was supposed to get scans but the insurance company denied it. Now it's been approved so he will get his scan sometime this month.       Clinical Pain Assessment (nonverbal scale for nonverbal patients):   [++++ Clinical Pain Assessment++++]  [++++Pain Severity++++]: Pain: 5  [++++Pain Character++++]: stabbing pain in back  [++++Pain Duration++++]: months for back pain, weeks for groin pain  [++++Pain Effect++++]: little  [++++Pain Factors++++]: oxycodone helps with back pain, groin pain elicited by standing and walking  [++++Pain Frequency++++]: constant back pain with varying intensity  [++++Pain Location++++]: left lower back  [++++ Clinical Pain Assessment++++]       FUNCTIONAL ASSESSMENT:     Palliative Performance Scale (PPS):  PPS: 70       PSYCHOSOCIAL/SPIRITUAL SCREENING:     Any spiritual / Jew concerns:  [] Yes /  [x] No    Caregiver Burnout:  [] Yes /  [x] No /  [] No Caregiver Present      Anticipatory grief assessment:   [x] Normal  / [] Maladaptive       ESAS Anxiety: Anxiety: 9    ESAS Depression: Depression: 0       REVIEW OF SYSTEMS:     The following systems were [x] reviewed / [] unable to be reviewed  Systems: constitutional, ears/nose/mouth/throat, respiratory, gastrointestinal, genitourinary, musculoskeletal, integumentary, neurologic, psychiatric, endocrine. Positive findings noted below. Modified ESAS Completed by: provider   Fatigue: 5 Drowsiness: 2   Depression: 0 Pain: 5   Anxiety: 9 Nausea: 0   Anorexia: 0 Dyspnea: 0   Best Well-Bein Constipation: Yes   Other Problem (Comment): 0          PHYSICAL EXAM:     Wt Readings from Last 3 Encounters:   12/10/20 150 lb 3.2 oz (68.1 kg)   10/21/20 150 lb 9.6 oz (68.3 kg)   20 160 lb (72.6 kg)     There were no vitals taken for this visit.   Last bowel movement: See Nursing Note    Constitutional    [] Appears well-developed and well-nourished in no apparent distress    [x] Abnormal: appears fatigued  Mental status  [x] Alert and awake  [x] Oriented to person/place/time  [x] Able to follow commands  [] Abnormal:   Eyes  [x] EOM normal   [x] Sclera normal   [x] No visible ocular discharge  [] Abnormal:   HENT  [x] Normocephalic, atraumatic  [x] Mouth/Throat: Moist mucous membranes   [x] External Ears normal  [] Abnormal:  Neck  [x] No visualized mass  [] Abnormal:  Pulmonary/Chest   [x] Respiratory effort normal  [x] No visualized signs of difficulty breathing or respiratory distress  [] Abnormal:  Musculoskeletal  [x] Normal gait with no signs of ataxia  [x] Normal range of motion of neck  [] Abnormal:  Neurological:   [x] No facial asymmetry (Cranial nerve 7 motor function)  [x] No gaze palsy  [] Abnormal:   Skin  [x] No significant exanthematous lesions or discoloration noted on facial skin  [] Abnormal:                                  Psychiatric  [x] Normal affect  [x] No hallucinations  [] Abnormal:    Other pertinent observable physical exam findings:    Due to this being a TeleHealth evaluation, many elements of the physical examination are unable to be assessed. HISTORY:     Past Medical History:   Diagnosis Date    Anxiety     Cancer [facility])     lymphoma Nov 2018 receiving chemo    Chronic pain     lower back- lymphoma    Hyperlipidemia     Hypertension     Lymphadenopathy 11/12/2018      Past Surgical History:   Procedure Laterality Date    HX HEART CATHETERIZATION  02/2019    HX OTHER SURGICAL  02/2019    cardiac stent    IR INJECTION PSEUDOANEURYSM  2/26/2019      Family History   Problem Relation Age of Onset    Hypertension Father     Diabetes Father     Diabetes Mother       History reviewed, no pertinent family history. Social History     Tobacco Use    Smoking status: Current Every Day Smoker     Packs/day: 0.50     Years: 20.00     Pack years: 10.00    Smokeless tobacco: Never Used   Substance Use Topics    Alcohol use: No     Frequency: Never     No Known Allergies   Current Outpatient Medications   Medication Sig    aspirin 81 mg chewable tablet Take 81 mg by mouth daily.  [START ON 2/1/2021] oxyCODONE IR (ROXICODONE) 5 mg immediate release tablet Take 1 Tab by mouth every four (4) hours as needed for Pain for up to 30 days. Max Daily Amount: 30 mg.    [START ON 3/3/2021] oxyCODONE IR (ROXICODONE) 5 mg immediate release tablet Take 1 Tab by mouth every four (4) hours as needed for Pain for up to 30 days.  Max Daily Amount: 30 mg.    [START ON 4/2/2021] oxyCODONE IR (ROXICODONE) 5 mg immediate release tablet Take 1 Tab by mouth every four (4) hours as needed for Pain for up to 30 days. Max Daily Amount: 30 mg.    L. acidoph & paracasei- S therm- Bifido (AUSTIN-Q/RISAQUAD) 8 billion cell cap cap take 1 cap daily    potassium chloride (K-DUR, KLOR-CON) 20 mEq tablet Take 1 Tab by mouth daily.  lisinopriL (PRINIVIL, ZESTRIL) 40 mg tablet TAKE 1 TABLET BY MOUTH EVERY DAY    escitalopram oxalate (LEXAPRO) 20 mg tablet TAKE 1 TABLET BY MOUTH EVERY DAY    metoprolol succinate (TOPROL-XL) 50 mg XL tablet TAKE 1 TABLET BY MOUTH EVERY DAY    atorvastatin (LIPITOR) 40 mg tablet TAKE 1 TAB BY MOUTH NIGHTLY. INDICATIONS: TREATMENT TO SLOW PROGRESSION OF CORONARY ARTERY DISEASE    clopidogrel (PLAVIX) 75 mg tab 1 Tab by Per NG tube route daily.  LORazepam (Ativan) 0.5 mg tablet Take 1 Tab by mouth.  predniSONE (DELTASONE) 20 mg tablet take 5 tablet by oral route  every day. Take on Days 1 through 5 each cycle    prochlorperazine (Compazine) 10 mg tablet Take 1 Tab by mouth.  senna-docusate (PERICOLACE) 8.6-50 mg per tablet Take 1 Tab by mouth.  ondansetron hcl (ZOFRAN) 4 mg tablet Take 2 Tabs by mouth every eight (8) hours as needed for Nausea or Vomiting.  naloxone (NARCAN) 4 mg/actuation nasal spray Use 1 spray intranasally, then discard. Repeat with new spray every 2 min as needed for opioid overdose symptoms, alternating nostrils. No current facility-administered medications for this visit.            LAB DATA REVIEWED:     Lab Results   Component Value Date/Time    WBC 7.6 12/10/2020 11:19 AM    HGB 14.2 12/10/2020 11:19 AM    PLATELET 625 65/04/3952 11:19 AM     Lab Results   Component Value Date/Time    Sodium 139 12/10/2020 11:19 AM    Potassium 3.1 (L) 12/10/2020 11:19 AM    Chloride 110 (H) 12/10/2020 11:19 AM    CO2 27 12/10/2020 11:19 AM    BUN 11 12/10/2020 11:19 AM    Creatinine 1.06 12/10/2020 11:19 AM    Calcium 8.6 12/10/2020 11:19 AM    Magnesium 1.7 01/12/2019 04:05 AM    Phosphorus 2.0 (L) 01/12/2019 04:05 AM      Lab Results   Component Value Date/Time    Alk. phosphatase 106 12/10/2020 11:19 AM    Protein, total 6.6 12/10/2020 11:19 AM    Albumin 3.5 12/10/2020 11:19 AM    Globulin 3.1 12/10/2020 11:19 AM     Lab Results   Component Value Date/Time    INR 1.1 02/22/2019 08:18 PM    Prothrombin time 10.8 02/22/2019 08:18 PM    aPTT 27.8 02/22/2019 08:18 PM      No results found for: IRON, FE, TIBC, IBCT, PSAT, FERR       MRI lumbar spine 12/18/19:  Congenital narrowing of the lumbar canal. Vertebral body heights are maintained.  Marrow signal is normal.     The conus medullaris terminates at T12/L1. Signal and caliber of the distal  spinal cord are within normal limits. There is no pathologic intrathecal  enhancement.     The paraspinal soft tissues are within normal limits.     Lower thoracic spine: No herniation or stenosis.     L1-L2: No herniation or stenosis.     L2-L3: No herniation or stenosis.     L3-L4: Mild facet arthropathy. Minimal central disc protrusion. Mild canal  stenosis. Foramina are patent     L4-L5: Desiccation. Mild facet arthropathy. Canal demonstrates minimal stenosis.  There is an annular ligament tear far laterally on the left. Mild left foraminal  stenosis.     L5-S1: Mild facet arthropathy. Canal and foramina are patent.     IMPRESSION:  Mild disc degenerative change at L3-4 and L4-5.     Mild canal stenosis at L3-4 and mild left foraminal stenosis at L4-5.     Other less severe degenerative findings are as described above.    CT chest/abdomen 12/16/19:  FINDINGS:      THYROID: No nodule.  MEDIASTINUM: No mass or lymphadenopathy. Port catheter in place on the right.  Catheter tip in appropriate position.  SB: No mass or lymphadenopathy.  THORACIC AORTA: No dissection or aneurysm.  MAIN PULMONARY ARTERY: Unremarkable  TRACHEA/BRONCHI: Unremarkable  ESOPHAGUS: No wall thickening or dilatation.  HEART:  Normal in size. PLEURA: No effusion or pneumothorax. LUNGS: Bibasilar atelectasis is noted. Cassie Graven LIVER: No mass or biliary dilatation. GALLBLADDER: Layering contrast versus cholelithiasis. SPLEEN: No mass. PANCREAS: No mass or ductal dilatation. ADRENALS: The left adrenal gland is elevated by the retroperitoneal mass. The    right is unremarkable. KIDNEYS: There is diminished soft tissue density at the level of the left renal  hilum. There is increased left renal cortical atrophy. STOMACH: Unremarkable. SMALL BOWEL: No dilatation or wall thickening. COLON: No dilatation or wall thickening. APPENDIX: Unremarkable. PERITONEUM: No ascites or pneumoperitoneum. RETROPERITONEUM:   Diminished size of retroperitoneal mass. Diminished in size with margins difficult to fully ascertain. 2-62 35 x 50 mm previously 71 x 94 mm.     2-67 left periaortic adenopathy 25 x 17 mm  The SMA, celiac, and LIZZY are remain encased, diminished attenuation of these  vessels.      REPRODUCTIVE ORGANS: The prostate and seminal vesicles are unremarkable. URINARY  BLADDER: Unremarkable  BONES: No destructive bone lesion. ADDITIONAL COMMENTS: Resolved left inguinal pseudoaneurysm. Cassie Graven RECIST        IMPRESSION:    Baseline research examination. Findings are consistent with interval response to therapy.      Continued diminished size of retroperitoneal mass-adenopathy,  with diminished soft tissue density at the left renal hilum.      Clinical Pain Assessment (nonverbal scale for nonver     CONTROLLED SUBSTANCES SAFETY ASSESSMENT (IF ON CONTROLLED SUBSTANCES):     Reviewed opioid safety handout:  [x] Yes   [] No  24 hour opioid dose >150mg morphine equivalent/day:  [] Yes   [x] No  Benzodiazepines:  [x] Yes   [] No  Sleep apnea:  [] Yes   [x] No  Urine Toxicology Testing within last 6 months:  [] Yes   [x] No  History of or new aberrant medication taking behaviors:  [] Yes   [x] No  Has Narcan been prescribed [x] Yes   [] No          Total time:   Counseling / coordination time:   > 50% counseling / coordination?:     Consent:  He and/or health care decision maker is aware that that he may receive a bill for this telehealth service, depending on his insurance coverage, and has provided verbal consent to proceed: Yes    CPT Codes 26246-02139 for Established Patients may apply to this Telehealth VisitPursuant to the emergency declaration under the [address] [place] [address] waiver authority and the [employer] Act, this Virtual  Visit was conducted, with patient's consent, to reduce the patient's risk of exposure to COVID-19 and provide continuity of care for an established patient. Services were provided through a video synchronous discussion virtually to substitute for in-person clinic visit.

## 2021-01-05 NOTE — TELEPHONE ENCOUNTER
Per VO of Dr. Brown Bio: 10/23/2020    Future Appointments   Date Time Provider Dahlia Epps   3/4/2021 11:00 AM SS INF7 CH2 <1H [facility]   5/27/2021 11:00 AM SS INF7 CH2 <1H [facility]   6/10/2021 10:30 AM Neno Flores, Himanshu Perez MD [facility] AMB   8/19/2021 11:00 AM [facility]       Requested Prescriptions     Pending Prescriptions Disp Refills    metoprolol succinate (TOPROL-XL) 50 mg XL tablet 90 Tab 0     Sig: TAKE 1 TABLET BY MOUTH EVERY DAY

## 2021-01-05 NOTE — PATIENT INSTRUCTIONS
Dear Alex Hernandez , 
 
It was a pleasure seeing you today via televisit. We will see you again in 12 weeks for a televisit. Congratulations on the arrival of jacinta Hendricksen Conchita! If labs or imaging tests have been ordered for you today, please call the office at [phone] 48 hours after completion to obtain the results. Your described symptoms were: Fatigue: 5 Drowsiness: 2 Depression: 0 Pain: 5 Anxiety: 9 Nausea: 0 Anorexia: 0 Dyspnea: 0 Best Well-Bein Constipation: Yes Other Problem (Comment): 0 This is the plan we talked about: 1. Back pain  
-Continue oxycodone 5-mg every 4 hours as needed 
-Today I sent another 3 1-month oxycodone prescriptions (#80) to your pharmacy for pick-up on , 3/3,  
-Continue tylenol as needed for moderate pain 
-Continue heat or ice as needed 
-Avoid taking ibuprofen, naprosyn or other any over-the-counter pain relievers which may interact with your blood thinner. 2. Anxiety/depression 
-Continue escitalopram to 20-mg daily. 
-Continue using deep breathing techniques for your anxiety 
-Our , Marc Martinez, is available to you for supportive counseling 3. Fatigue 
-This is chronic and unchanged 
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 Poor appetite 
-You're eating and you haven't lost any weight 5. Low potassium 
-You saw DR. Bowers last month and she prescribed potassium pills 
-You've been taking these regularly 6. Lymphoma 
-You saw Dr. Gil Keller last month 
-Marian Long going to have port removed! 
-You anticipate having repeat scans later this month This is what you have shared with us about Advance Care Planning: 
 
  Primary Decision Maker: [de-identified] - Ex-Spouse - [phone] [] Named in a scanned document  
[x] Legal Next of Kin 
[] Guardian ACP documents you current have include: 
[] Advance Directive or Living Will 
[] Durable Do Not Resuscitate 
[] Physician Orders for Scope of Treatment (POST) 
[] Medical Power of  
[] Other 
 
 
The Palliative Medicine Team is here to support you and your family.  
 
 
 
Sincerely, 
 
 
Lelo Montalvo MD and the Palliative Medicine Team

## 2021-01-05 NOTE — PROGRESS NOTES
Palliative Medicine Office Visit  Palliative Medicine Nurse Check In  [phone]    Patient Name: Zenovia Halsted. YOB: 1975      Date of Office Visit:     Patient states:     From Check In Sheet (scanned in Media):  Has a medical provider talked with you about cardiopulmonary resuscitation (CPR)? [x] Yes   [] No   [] Unable to obtain    Nurse reminder to complete or update ACP FlowSheet:    Is ACP on the Problem List?    [] Yes    [x] No  IF ACP Document is ON FILE; Nurse to place ACP on Problem List     Is there an ACP Note in Chart Review/Note? [] Yes    [x] No   If NO: ALERT PROVIDER       Primary Decision Maker: [de-identified] - Ex-Spouse - [phone]  Advance Care Planning 1/5/2021   Patient's Healthcare Decision Maker is: Verbal statement (Legal Next of Kin remains as decision maker)   Confirm Advance Directive None   Patient Would Like to Complete Advance Directive -   Does the patient have other document types -         Is there anything that we should know about you as a person in order to provide you the best care possible? Have you been to the ER, urgent care clinic since your last visit? [] Yes   [x] No   [] Unable to obtain    Have you been hospitalized since your last visit? [] Yes   [x] No   [] Unable to obtain    Have you seen or consulted any other health care providers outside of the [address] [place] [address] since your last visit?    [] Yes   [x] No   [] Unable to obtain    Functional status (describe):         Last BM: 1/4/2021     accessed (date): 1/5/2021    Bottle review (for opioid pain medication):  Medication 1:   Date filled:   Directions:   # filled:   # left:   # pills taking per day:  Last dose taken:    Medication 2:   Date filled:   Directions:   # filled:   # left:   # pills taking per day:  Last dose taken:    Medication 3:   Date filled:   Directions:   # filled:   # left:   # pills taking per day:  Last dose taken:    Medication 4:   Date filled:   Directions:   # filled:   # left:   # pills taking per day:  Last dose taken:

## 2021-01-08 ENCOUNTER — TELEPHONE (OUTPATIENT)
Dept: PALLATIVE CARE | Age: 46
End: 2021-01-08

## 2021-01-08 NOTE — TELEPHONE ENCOUNTER
Corrie Hays [address] Work  [phone]    Telephone Call    Narrative  SW called patient on this day in follow-up for support and in congratulations for the birth of his youngest child. Patient reports feeling well and no needs at this moment, but appreciate of SW availability for support. Assessment / Actions  SW reinforced own availability and means of contact. Plan  SW to continue following as needed.       Anderson Martin, [facility]   Palliative Medicine   Supervisee in Social Work  Respecting Choices[address]  [phone]

## 2021-02-01 ENCOUNTER — TELEPHONE (OUTPATIENT)
Dept: PALLATIVE CARE | Age: 46
End: 2021-02-01

## 2021-02-01 NOTE — TELEPHONE ENCOUNTER
Patient requesting a refill on Oxycodone. Patient has a prescription on file to be filled 2/1/2021. Call placed to pharmacy to have prescription process. Patient notified.

## 2021-02-11 RX ORDER — ESCITALOPRAM OXALATE 20 MG/1
TABLET ORAL
Qty: 30 TAB | Refills: 5 | Status: SHIPPED | OUTPATIENT
Start: 2021-02-11 | End: 2022-04-20

## 2021-02-12 DIAGNOSIS — E87.6 HYPOKALEMIA: Primary | ICD-10-CM

## 2021-02-12 RX ORDER — POTASSIUM CHLORIDE 750 MG/1
10 TABLET, EXTENDED RELEASE ORAL DAILY
Qty: 30 TAB | Refills: 0 | Status: SHIPPED | OUTPATIENT
Start: 2021-02-12 | End: 2022-04-20

## 2021-03-02 ENCOUNTER — TELEPHONE (OUTPATIENT)
Dept: PALLATIVE CARE | Age: 46
End: 2021-03-02

## 2021-03-02 NOTE — TELEPHONE ENCOUNTER
Patient calling and requesting a refill on the following:    Oxycodone IR. Advised patient that script was sent to [facility] and will be ready on 3/3/2021.

## 2021-03-03 ENCOUNTER — TELEPHONE (OUTPATIENT)
Dept: PALLATIVE CARE | Age: 46
End: 2021-03-03

## 2021-03-03 RX ORDER — HEPARIN 100 UNIT/ML
500 SYRINGE INTRAVENOUS AS NEEDED
Status: CANCELLED | OUTPATIENT
Start: 2021-09-02

## 2021-03-03 RX ORDER — SODIUM CHLORIDE 0.9 % (FLUSH) 0.9 %
5-10 SYRINGE (ML) INJECTION AS NEEDED
Status: CANCELLED | OUTPATIENT
Start: 2022-02-15

## 2021-03-03 RX ORDER — SODIUM CHLORIDE 9 MG/ML
10 INJECTION INTRAMUSCULAR; INTRAVENOUS; SUBCUTANEOUS AS NEEDED
Status: CANCELLED | OUTPATIENT
Start: 2022-02-15

## 2021-03-03 RX ORDER — SODIUM CHLORIDE 0.9 % (FLUSH) 0.9 %
5-10 SYRINGE (ML) INJECTION AS NEEDED
Status: CANCELLED | OUTPATIENT
Start: 2021-09-02

## 2021-03-03 RX ORDER — HEPARIN 100 UNIT/ML
500 SYRINGE INTRAVENOUS AS NEEDED
Status: CANCELLED | OUTPATIENT
Start: 2022-02-15

## 2021-03-03 RX ORDER — SODIUM CHLORIDE 9 MG/ML
10 INJECTION INTRAMUSCULAR; INTRAVENOUS; SUBCUTANEOUS AS NEEDED
Status: CANCELLED | OUTPATIENT
Start: 2021-09-02

## 2021-03-03 NOTE — TELEPHONE ENCOUNTER
Call placed to pharmacy, script was in the system. Now processing for patient pickup.  Patient notified

## 2021-03-03 NOTE — TELEPHONE ENCOUNTER
Patient is calling stating that pharmacy does not has script for Oxycodone IR 5 mg. Confirmed pharmacy that is on script. Advised that nurse will check and call him back with an update.

## 2021-03-04 NOTE — TELEPHONE ENCOUNTER
Heart Failure Clinic       Visit Date: 3/4/2021  Cardiologist:  Dr. Susannah Gale  Primary Care Physician: Dr. Aida Jacobs MD    Yolie Lorenzana is a 78 y.o. female who presents today for:  Chief Complaint   Patient presents with    Congestive Heart Failure       HPI:   Yolie Lorenzana is a 78 y.o. female who presents to the office for a follow up patient visit in the heart failure clinic. Accompanied by no one    TYPE HF: Diastolic (EF 62-59%)  Device: no  HX: HTN, HLD,CAD (PCI, several yrs ago-2017ish), Renal stone Shabnam Slice), significant family hx Cancers   ? ?GENNARO had sleep study in past - not tolerate mask     Dry TI: [id number]     Hospitalization:  None  New pt to [facility] in June   LHC/RHC in August - elevated pressures/volume overload - started on Lasix 40 BID   Referred to CHF clinic  OV 9/14/20 - added Aldactone   Dealing w/ some kidney stones - Dr Russell Ang 1/14 = no changes  Labs 1/12 - stable    Concerns today: c/o cold/heat disturbance. Tired, continues w/ HORTON - no worse. HR increases at times = in 100s - will rest and recover. 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 Walked from entrance, no break.   Paces herself  Diet: watches    Patient has:  Chest Pain: no  SOB: HORTON  Orthopnea/PND: no  GENNARO: no  Edema: NNo  Fatigue: on/off  Abdominal bloating: no  Cough: no  Appetite: good  Any extra diuretic use: no  Weight gain: no  Home weight: stable  Home blood pressure: stable    Past Medical History:   Diagnosis Date    CAD (coronary artery disease)     PCI     Chronic kidney disease     stones    Hyperlipidemia     Hypertension      Past Surgical History:   Procedure Laterality Date    APPENDECTOMY      CAROTID STENT PLACEMENT      CHOLECYSTECTOMY      COLONOSCOPY      CYSTO/URETERO/PYELOSCOPY, CALCULUS TX N/A 5/8/2019    CYSTO, LEFT URETEROSCOPY, URETERORENOSCOPY, LASER LITOHTRIPSY, BASKET RETRIEVAL OF STONE FRAGMENTS, LEFT URETERAL STENT performed by Yony Stewart [place] [id number]  [phone]      12/14/18 1:25 PM Scheduled patient's ECHO for 12/27 at 8 AM with arrival time of 7:45 AM. Patient to see Dr. Rowe Boxer as scheduled at 5 AM. Informed patient's aunt, Rae Roca (okay per PHI on file), of above appointment information. She verbalized understanding. No further questions or concerns at this time. Elvi Woodall MD at [address], VAGINAL      KIDNEY STONE SURGERY      OVARY REMOVAL       Family History   Problem Relation Age of Onset    Cancer Mother     Heart Disease Father     Cancer Sister     Cancer Brother     Cancer Sister     Cancer Sister     Cancer Sister     Cancer Sister     Cancer Sister     Cancer Sister      Social History     Tobacco Use    Smoking status: Never Smoker    Smokeless tobacco: Never Used   Substance Use Topics    Alcohol use: Not Currently     Current Outpatient Medications   Medication Sig Dispense Refill    potassium chloride (KLOR-CON 10) 10 MEQ extended release tablet Take 1 tablet by mouth 2 times daily 180 tablet 5    isosorbide mononitrate (IMDUR) 30 MG extended release tablet Take 2 tablets by mouth 2 times daily 120 tablet 11    dilTIAZem (CARDIZEM) 60 MG tablet Take 1 tablet by mouth 2 times daily 180 tablet 3    spironolactone (ALDACTONE) 25 MG tablet Take 1 tablet by mouth daily 90 tablet 3    furosemide (LASIX) 40 MG tablet Take 1 tablet by mouth 2 times daily 180 tablet 3    metoprolol tartrate (LOPRESSOR) 25 MG tablet Take 1 tablet by mouth 2 times daily 180 tablet 3    nitroGLYCERIN (NITROSTAT) 0.4 MG SL tablet Place 1 tablet under the tongue every 5 minutes as needed for Chest pain (Maximum 3 doses) 25 tablet 1    vitamin E 400 UNIT capsule Take 400 Units by mouth daily      Biotin 10 MG CAPS Take by mouth      clopidogrel (PLAVIX) 75 MG tablet Take 1 tablet by mouth daily 90 tablet 3    simvastatin (ZOCOR) 10 MG tablet Take 10 mg by mouth nightly       No current facility-administered medications for this visit. Allergies   Allergen Reactions    Codeine Other (See Comments)     Breathing difficulties       SUBJECTIVE:   Review of Systems   Constitutional: Negative for activity change, appetite change and fatigue. Respiratory: Positive for shortness of breath (HORTON). Negative for cough. Cardiovascular: Positive for palpitations (at times). Negative for chest pain and leg swelling. Gastrointestinal: Negative for abdominal distention. Neurological: Negative for weakness, light-headedness and headaches. Hematological: Negative for adenopathy. Psychiatric/Behavioral: Negative for sleep disturbance. OBJECTIVE:   Today's Vitals:  /60   Pulse 59   Ht 5' 3\" (1.6 m)   Wt 176 lb 3.2 oz (79.9 kg)   SpO2 96%   BMI 31.21 kg/m²     Physical Exam  Vitals signs reviewed. Constitutional:       General: She is not in acute distress. Appearance: Normal appearance. She is well-developed. She is not diaphoretic. HENT:      Head: Normocephalic and atraumatic. Eyes:      Conjunctiva/sclera: Conjunctivae normal.   Neck:      Musculoskeletal: Normal range of motion and neck supple. Comments: No JVD  Cardiovascular:      Rate and Rhythm: Normal rate and regular rhythm. Heart sounds: Normal heart sounds. No murmur. Pulmonary:      Effort: Pulmonary effort is normal. No respiratory distress. Breath sounds: Normal breath sounds. No wheezing or rales. Abdominal:      General: Bowel sounds are normal. There is no distension. Palpations: Abdomen is soft. Tenderness: There is no abdominal tenderness. Musculoskeletal: Normal range of motion. Right lower leg: No edema. Left lower leg: No edema. Skin:     General: Skin is warm and dry. Capillary Refill: Capillary refill takes less than 2 seconds. Neurological:      Mental Status: She is alert and oriented to person, place, and time.       Coordination: Coordination normal.   Psychiatric:         Behavior: Behavior normal.         Wt Readings from Last 3 Encounters:   03/04/21 176 lb 3.2 oz (79.9 kg)   01/14/21 178 lb 9.6 oz (81 kg)   12/10/20 177 lb 3.2 oz (80.4 kg)     BP Readings from Last 3 Encounters:   03/04/21 110/60   01/14/21 130/74   12/10/20 110/60     Pulse Readings from Last 3 Encounters:   03/04/21 59   01/14/21 80   12/10/20 72     Body mass index is 31.21 kg/m². ECHO:    Summary   Ejection fraction was estimated at 50-55%.  E/A flow reversal noted. Suggestive of diastolic.   Ascending aorta = 3.6 cm. Dominique Ferguson size is within normal limits with normal respiratory phasic changes.      Signature      ----------------------------------------------------------------   Electronically signed by Dayami Kee MD (Interpreting   [email or web address]) on 06/22/2020 at 04:15 PM   ----------------------------------------------------------------      Findings      Mitral Valve   The mitral valve structure was normal with normal leaflet separation.   DOPPLER: The transmitral velocity was within the normal range with no   evidence for mitral stenosis. There was no evidence of mitral   regurgitation.      Aortic Valve   The aortic valve was trileaflet with normal thickness and cuspal   separation. DOPPLER: Transaortic velocity was within the normal range with   no evidence of aortic stenosis. There was mild aortic regurgitation.      Tricuspid Valve   The tricuspid valve structure was normal with normal leaflet separation.   DOPPLER: There was no evidence of tricuspid stenosis. There was no   evidence of tricuspid regurgitation.      Pulmonic Valve   The pulmonic valve leaflets were not well seen. DOPPLER: The transpulmonic   velocity was within the normal range with no evidence for regurgitation.      Left Atrium   Left atrial size was normal.      Left Ventricle   Normal left ventricular size and systolic function.   There were no regional wall motion abnormalities.   Wall thickness was within normal limits.   Ejection fraction was estimated at 50-55%.  E/A flow reversal noted.  Suggestive of diastolic dysfunction.      Right Atrium   Right atrial size was normal.      Right Ventricle   The right ventricular size was normal with normal systolic function and   wall thickness.      Pericardial Effusion   The pericardium was normal in appearance with no evidence of a pericardial   effusion.      Pleural Effusion   No evidence of pleural effusion.      Aorta / Great Vessels   -Ascending aorta = 3.6 cm.   -IVC size is within normal limits with normal respiratory phasic changes     CATH/STRESS:   SUMMARY:  PDA with 70% to 80% stenosis; elevated right heart cath  pressures and elevated EDP suggestive of volume overload with preserved  cardiac output.     RIGHT HEART CATHETERIZATION:  RA 16, RV 41/20, PA 41/23 with a mean of  29, wedge pressure 20, PA saturation is 74%, cardiac output is 4,  cardiac index 2.1.     PLAN:  1.  Bedrest.  2.  Optimal medical therapy. 3.  Risk factor management. 4.  Routine access site care. 5.  Aggressive diuresis, increase Lasix.  Follow up with heart failure  clinic.  Avoid salt and reduce fluid intake.  We will reassess symptoms  at that time. Chip Plascencia has a negative ischemic evaluation.  These findings  are likely more incidental rather than cause of her cardiac  decompensation.  Majority of her shortness of breath appears to be  volume and pressure related.  If she has symptoms despite normalizing  her volume status and her blood pressure, then I would consider  intervention of the PDA.     All the above was explained to the patient and the patient's family.    They were agreeable and amenable to the plan.        Daja Nunez MD     D: 08/16/2020 9:51:16         HOLTER  SUMMARY:  No significant abnormalities explained the palpitations, very  occasional PACs and PVCs.     Daja Nunez MD     D: 12/03/2020 5:54:16         Results reviewed:  BNP: No results found for: BNP  CBC:   Lab Results   Component Value Date    WBC 4.6 08/13/2020    RBC 3.86 08/13/2020    HGB 13.4 08/13/2020    HCT 40.6 08/13/2020     08/13/2020     CMP:    Lab Results   Component Value Date     01/12/2021    K 4.3 01/12/2021    K 3.8 08/13/2020    K 3.9 08/13/2020     01/12/2021    CO2 29 01/12/2021 radiology reports, etc.   I personally spent more then 50% of the appt time face to face with the patient. · Daily weights  · Fluid restriction of 2 Liters per day  · Limit sodium in diet to around 4752-0545 mg/day  · Monitor BP  · Activity as tolerated     Patient was instructed to call the [facility] V[facility] for any changes in symptoms as noted in AVS.      Return in about 6 months (around 9/4/2021). or sooner if needed     Patient given educational materials - see patient instructions. We discussed the importance of weighing oneself and recording daily. We also discussed the importance of a low sodium diet, higher sodium foods to avoid and better low sodium food options. Patient verbalizes understanding of plan of care using teach back method, and is agreeable to the treatment plan.        Electronically signed by NASIM Pinon CNP on 3/4/2021 at 1:01 PM

## 2021-04-01 ENCOUNTER — TELEPHONE (OUTPATIENT)
Dept: PALLATIVE CARE | Age: 46
End: 2021-04-01

## 2021-04-01 NOTE — TELEPHONE ENCOUNTER
The patient is calling to confirm his prescription for Oxycodone will be filled/renewed at [facility] on 4/3/21.

## 2021-04-01 NOTE — TELEPHONE ENCOUNTER
Spoke with patient, patient has a prescription for oxycodone on hold that can be filled tomorrow 4/2/2021. Patient verbalized understanding, understands to reach out to the pharmacy tomorrow for processing.

## 2021-04-02 ENCOUNTER — TELEPHONE (OUTPATIENT)
Dept: PALLATIVE CARE | Age: 46
End: 2021-04-02

## 2021-04-02 NOTE — TELEPHONE ENCOUNTER
Patient calling upset stating that his oxycodone is not at pharmacy per the pharmacy. States this has happened last 3 times. Advised patient that script was sent to pharmacy (confirmed pharmacy) and advised that nurse would call pharmacy to check with them and then call him back  With update.

## 2021-04-02 NOTE — TELEPHONE ENCOUNTER
Call placed to pharmacy, in regards to oxycodone post dated script sent on 1/5/202. Pharmacist was able to locate the script. She apologized and indicated their are several new people on her team and they probably did not go back far enough in his profile to locate script.  Pharmacist to process script, for     Patient notified

## 2021-04-05 RX ORDER — METOPROLOL SUCCINATE 50 MG/1
TABLET, EXTENDED RELEASE ORAL
Qty: 90 TAB | Refills: 0 | OUTPATIENT
Start: 2021-04-05

## 2021-04-05 NOTE — TELEPHONE ENCOUNTER
Per VO of Dr. Clay Bowman: 10/23/2020    Future Appointments   Date Time Provider Dahlia Epps   4/6/2021  8:30 AM Jessica Bar MD PCS BS AMB   4/12/2021  9:30 AM Yamil Coon DPM RPP BS AMB   5/27/2021 11:00 AM SS INF7 CH2 <1H [facility]   6/10/2021 10:30 AM Corky Bowers MD [facility] BS AMB   8/19/2021 11:00 [facility]       Requested Prescriptions     Pending Prescriptions Disp Refills    metoprolol succinate (TOPROL-XL) 50 mg XL tablet [Pharmacy Med Name: METOPROLOL SUCC ER 50 MG TAB] 90 Tab 0     Sig: TAKE 1 TABLET BY MOUTH EVERY DAY

## 2021-04-06 ENCOUNTER — VIRTUAL VISIT (OUTPATIENT)
Dept: PALLATIVE CARE | Age: 46
End: 2021-04-06
Payer: COMMERCIAL

## 2021-04-06 DIAGNOSIS — F41.9 ANXIETY: ICD-10-CM

## 2021-04-06 DIAGNOSIS — G47.00 INSOMNIA, UNSPECIFIED TYPE: Primary | ICD-10-CM

## 2021-04-06 DIAGNOSIS — M54.50 CHRONIC LEFT-SIDED LOW BACK PAIN WITHOUT SCIATICA: ICD-10-CM

## 2021-04-06 DIAGNOSIS — C82.90 FOLLICULAR LYMPHOMA, UNSPECIFIED FOLLICULAR LYMPHOMA TYPE, UNSPECIFIED BODY REGION (HCC): ICD-10-CM

## 2021-04-06 DIAGNOSIS — G89.29 CHRONIC LEFT-SIDED LOW BACK PAIN WITHOUT SCIATICA: ICD-10-CM

## 2021-04-06 DIAGNOSIS — F32.9 REACTIVE DEPRESSION: ICD-10-CM

## 2021-04-06 DIAGNOSIS — R63.0 POOR APPETITE: ICD-10-CM

## 2021-04-06 PROCEDURE — 99214 OFFICE O/P EST MOD 30 MIN: CPT | Performed by: INTERNAL MEDICINE

## 2021-04-06 RX ORDER — OXYCODONE HYDROCHLORIDE 5 MG/1
5 TABLET ORAL
Qty: 80 TAB | Refills: 0 | Status: SHIPPED | OUTPATIENT
Start: 2021-05-02 | End: 2021-04-12 | Stop reason: SDUPTHER

## 2021-04-06 RX ORDER — OXYCODONE HYDROCHLORIDE 5 MG/1
5 TABLET ORAL
Qty: 80 TAB | Refills: 0 | Status: SHIPPED | OUTPATIENT
Start: 2021-06-01 | End: 2021-06-04 | Stop reason: SDUPTHER

## 2021-04-06 NOTE — PATIENT INSTRUCTIONS
Dear Zack Sawyer. , 
 
It was a pleasure seeing you today via televisit. We will see you again on Matilde 10, 2021 to coordinate with your return to see Dr. Heydi Lopez If labs or imaging tests have been ordered for you today, please call the office at [phone] 48 hours after completion to obtain the results. Your described symptoms were: Fatigue: 6 Drowsiness: 4 Depression: 0 Pain: 7 Anxiety: 10 Nausea: 0 Anorexia: 4 Dyspnea: 0 Best Well-Bein Constipation: No  
     
 
This is the plan we talked about: 1. Poor sleep/insomia  
-Today we discussed interventions you can use to improve your sleep 
-You agreed to work with Stacy Moseley on developing relaxation techniques to use at bedtime 
-She will give you a call to schedule an appointment 2. Back pain  
-Continue oxycodone 5-mg every 4 hours as needed 
-Today I sent another 2 1-month oxycodone prescriptions (#80) to your pharmacy for pick-up on  and  
-Continue tylenol as needed for moderate pain 
-Continue heat or ice as needed 
-Avoid taking ibuprofen, naprosyn or other any over-the-counter pain relievers which may interact with your blood thinner. 3. Anxiety/depression 
-Continue escitalopram to 20-mg daily. 4. Fatigue 
-This is chronic and unchanged 
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 5. Poor appetite 
-You're eating and you haven't lost any weight 6. Lymphoma 
-You saw Dr. Heydi Lopez last month 
-Sandy Dancer going to have port removed! 
-You anticipate having repeat scans later this month 7. Heart disease - We discussed your making a follow-up appointment with your cardiologist 
 
 
This is what you have shared with us about Advance Care Planning: 
 
  Primary Decision Maker: [de-identified] - Ex-Spouse - [phone] [] Named in a scanned document  
[x] Legal Next of Kin 
[] Guardian ACP documents you current have include: 
[] Advance Directive or Living Will 
[] Durable Do Not Resuscitate [] Physician Orders for Scope of Treatment (POST) [] Medical Power of  
[] Other The Palliative Medicine Team is here to support you and your family. Sincerely, Lynn Fox MD and the Palliative Medicine Team 
  
 
  
 
  

## 2021-04-06 NOTE — PROGRESS NOTES
Palliative Medicine Office Visit  Palliative Medicine Nurse Check In  [phone]    Patient Name: Johanna Thompson. YOB: 1975      Date of Office Visit: 4/6/21    Patient states: \"follow up \"    From Check In Sheet (scanned in Media):  Has a medical provider talked with you about cardiopulmonary resuscitation (CPR)? [] Yes   [x] No   [] Unable to obtain    Nurse reminder to complete or update ACP FlowSheet:    Is ACP on the Problem List?    [] Yes    [x] No  IF ACP Document is ON FILE; Nurse to place ACP on Problem List     Is there an ACP Note in Chart Review/Note? [] Yes    [x] No   If NO: [address] Planning 1/5/2021   Patient's Healthcare Decision Maker is: Verbal statement (Legal Next of Kin remains as decision maker)   Confirm Advance Directive None   Patient Would Like to Complete Advance Directive -   Does the patient have other document types -       Is there anything that we should know about you as a person in order to provide you the best care possible? Have you been to the ER, urgent care clinic since your last visit? [] Yes   [x] No   [] Unable to obtain    Have you been hospitalized since your last visit? [] Yes   [x] No   [] Unable to obtain    Have you seen or consulted any other health care providers outside of the [address] [place] [address] since your last visit? [] Yes   [x] No   [] Unable to obtain    Functional status (describe):    Fully independent      Last BM: last night     accessed (date):  4/6/21    Bottle review (for opioid pain medication):  Medication 1:  Oxycodone   Date filled:   Directions: 5mg 1 every 4 prn  # filled:  80 for 30 days  # left:   # pills taking per day: 2-3 a day  Last dose taken:    Medication 2:   Date filled:   Directions:   # filled:   # left:   # pills taking per day:  Last dose taken:    Medication 3:   Date filled:   Directions:   # filled:   # left:   # pills taking per day:  Last dose taken:     Medication 4:   Date filled:   Directions:   # filled:   # left:   # pills taking per day:  Last dose taken:

## 2021-04-06 NOTE — PROGRESS NOTES
Palliative Medicine Outpatient Services  [place]: [phone] ([id number])    Patient Name: Ina Goodell. YOB: 1975    Date of Current Visit: 04/06/21  Location of Current Visit:    [] [facility] Office  [] [facility] Office  [] [address] [place] [address]  [] Home  [x] Other:  televisit    Date of Initial Visit: 2/19/19   Referral from: Alicja Busch MD  Primary Care Physician: None      SUMMARY:   Ina Goodell. is a 39y.o. year old with high-grade follicular lymphoma, who was referred to Palliative Medicine by Dr. Eric Helton for symptom management and supportive care. He was diagnosed in 11/2018 after presenting with back pain. He is currently receiving systemic chemotherapy. He suffered cardiac arrest during cycle #3 due to previously undiagnosed severe stenosis of the LAD s/p PTCA and stent. He has since completed systemic chemotherapy. His most recent PET-CT (5/2019) showed no focal areas of increased hypermetabolic uptake. The patients social history includes: he is single. He lives in [place]. He has 3 teenage children who live with their mother and with whom he has close contact. 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 He's applied for disability. Palliative Medicine is addressing the following current patient/family concerns: anxiety, depression, left mid- and low back pain, poor appetite, fatigue, advanced care planning. Initial Referral Intake note from Jodie Mancuso RN reviewed prior to visit   PALLIATIVE DIAGNOSES:       ICD-10-CM ICD-9-CM    1. Insomnia, unspecified type  G47.00 780.52    2. Chronic left-sided low back pain without sciatica  M54.5 724.2 oxyCODONE IR (ROXICODONE) 5 mg immediate release tablet    G89.29 338.29 oxyCODONE IR (ROXICODONE) 5 mg immediate release tablet   3. Anxiety  F41.9 300.00    4. Reactive depression  F32.9 300.4    5. Poor appetite  R63.0 783.0    6.  Follicular lymphoma, unspecified follicular lymphoma type, unspecified body region ([facility]  C82.90 202.00 PLAN:   Patient Instructions       Dear Marcie Patricio. ,    It was a pleasure seeing you today via televisit. We will see you again on Matilde 10, 2021 to coordinate with your return to see Dr. Adonis Romo      If labs or imaging tests have been ordered for you today, please call the office at [phone] 48 hours after completion to obtain the results. Your described symptoms were: Fatigue: 6 Drowsiness: 4   Depression: 0 Pain: 7   Anxiety: 10 Nausea: 0   Anorexia: 4 Dyspnea: 0   Best Well-Bein Constipation: No           This is the plan we talked about:      1. Poor sleep/insomia   -Today we discussed interventions you can use to improve your sleep  -You agreed to work with Melissa Tapia on developing relaxation techniques to use at bedtime  -She will give you a call to schedule an appointment    2. Back pain   -Continue oxycodone 5-mg every 4 hours as needed  -Today I sent another 2 1-month oxycodone prescriptions (#80) to your pharmacy for pick-up on  and   -Continue tylenol as needed for moderate pain  -Continue heat or ice as needed  -Avoid taking ibuprofen, naprosyn or other any over-the-counter pain relievers which may interact with your blood thinner. 3. Anxiety/depression  -Continue escitalopram to 20-mg daily. 4. Fatigue  -This is chronic and unchanged  -You continue to work full-time as a manager at [employer]    5. Poor appetite  -You're eating and you haven't lost any weight    6. Lymphoma  -You saw Dr. Adonis Romo last month  -Pinkie Limbo going to have port removed!  -You anticipate having repeat scans later this month    7.  Heart disease  - We discussed your making a follow-up appointment with your cardiologist      This is what you have shared with us about Advance Care Planning:      Primary Decision Maker: [de-identified] - Ex-Spouse - [phone]  [] Named in a scanned document   [x] Legal Next of Kin  [] Guardian    ACP documents you current have include:  [] Advance Directive or Living Will  [] Durable Do Not Resuscitate  [] Physician Orders for Scope of Treatment (POST)  [] Medical Power of   [] Other      The Palliative Medicine Team is here to support you and your family. Sincerely,      Marco A Reynoso MD and the Palliative Medicine Team               Insomnia: Care Instructions  Your Care Instructions     Insomnia is the inability to sleep well. It is a common problem for most people at some time. Insomnia may make it hard for you to get to sleep, stay asleep, or sleep as long as you need to. This can make you tired and grouchy during the day. It can also make you forgetful, less effective at work, and unhappy. Insomnia can be caused by conditions such as depression or anxiety. Pain can also affect your ability to sleep. When these problems are solved, the insomnia usually clears up. But sometimes bad sleep habits can cause insomnia. If insomnia is affecting your work or your enjoyment of life, you can take steps to improve your sleep. Follow-up care is a key part of your treatment and safety. Be sure to make and go to all appointments, and call your doctor if you are having problems. It's also a good idea to know your test results and keep a list of the medicines you take. How can you care for yourself at home? What to avoid   · Do not have drinks with caffeine, such as coffee or black tea, for 8 hours before bed. · Do not smoke or use other types of tobacco near bedtime. Nicotine is a stimulant and can keep you awake. · Avoid drinking alcohol late in the evening, because it can cause you to wake in the middle of the night. · Do not eat a big meal close to bedtime. If you are hungry, eat a light snack. · Do not drink a lot of water close to bedtime, because the need to urinate may wake you up during the night. · Do not read or watch TV in bed. Use the bed only for sleeping and sexual activity.   What to try   · Go to bed at the same time every night, and wake up at the same time every morning. Do not take naps during the day. · Keep your bedroom quiet, dark, and cool. · Sleep on a comfortable pillow and mattress. · If watching the clock makes you anxious, turn it facing away from you so you cannot see the time. · If you worry when you lie down, start a worry book. Well before bedtime, write down your worries, and then set the book and your concerns aside. · Try meditation or other relaxation techniques before you go to bed. · If you cannot fall asleep, get up and go to another room until you feel sleepy. Do something relaxing. Repeat your bedtime routine before you go to bed again. · Make your house quiet and calm about an hour before bedtime. Turn down the lights, turn off the TV, log off the computer, and turn down the volume on music. This can help you relax after a busy day. When should you call for help? Watch closely for changes in your health, and be sure to contact your doctor if:    · Your efforts to improve your sleep do not work.     · Your insomnia gets worse.     · You have been feeling down, depressed, or hopeless or have lost interest in things that you usually enjoy. Where can you learn more? Go to http://www.gray.com/  Enter P513 in the search box to learn more about \"Insomnia: Care Instructions. \"  Current as of: August 31, 2020               Content Version: 12.8  © 2006-2021 [employer]. Care instructions adapted under license by [employer] (which disclaims liability or warranty for this information). If you have questions about a medical condition or this instruction, always ask your healthcare professional. [place] [address] any warranty or liability for your use of this information.              GOALS OF CARE / TREATMENT PREFERENCES:   [====Goals of Care====]  GOALS OF CARE:  Patient / health care proxy stated goals: See Patient Instructions / Summary    TREATMENT PREFERENCES: Code Status:  [x] Attempt Resuscitation       [] Do Not Attempt Resuscitation    Advance Care Planning:  [x] The Pall Med Interdisciplinary Team has updated the ACP Navigator with Decision Maker and Patient Capacity      Primary Decision Maker: [de-identified] - Ex-Spouse - [phone]  [] Named in a scanned document   [x] Legal Next of Kin  [] Guardian    Other:  (If patient appropriate for POST, consider using PALLPOST smart phrase here)    The palliative care team has discussed with patient / health care proxy about goals of care / treatment preferences for patient.  [====Goals of Care====]     PRESCRIPTIONS GIVEN:     Medications Ordered Today   Medications    oxyCODONE IR (ROXICODONE) 5 mg immediate release tablet     Sig: Take 1 Tab by mouth every four (4) hours as needed for Pain for up to 30 days. Max Daily Amount: 30 mg. Dispense:  80 Tab     Refill:  0    oxyCODONE IR (ROXICODONE) 5 mg immediate release tablet     Sig: Take 1 Tab by mouth every four (4) hours as needed for Pain for up to 30 days. Max Daily Amount: 30 mg. Dispense:  80 Tab     Refill:  0           FOLLOW UP:     Future Appointments   Date Time Provider Dahlia Epps   4/12/2021  9:30 AM Pedro Coon DPM [facility] BS AMB   5/27/2021 11:00 AM [facility]   6/10/2021 10:30 AM Murali Bowers MD [facility] BS AMB   8/19/2021 11:00 AM SS INF7 CH2 <1H [facility]           PHYSICIANS INVOLVED IN CARE:   Patient Care Team:  None as PCP - Sarah Ramos MD (Hematology and Oncology)  Ancelmo Camara MD as Physician (Palliative Medicine)  Ancelmo Camara MD as Physician (Palliative Medicine)       HISTORY:   Reviewed patient-completed ESAS and advance care planning form.   Reviewed patient record in prescription monitoring program.    CHIEF COMPLAINT:   Chief Complaint   Patient presents with    Insomnia       HPI/SUBJECTIVE:    The patient is: [x] Verbal / [] Nonverbal     He's tired.  He's having trouble sleeping. He goes to bed around midnight but doesn't get to sleep until 6:00am.  He gets up about 10:30am.  The lights are off in his bedroom. He doesn't have the TV or a radio on. He lies down and his mind is just racing. He just lays in bed until he can go to sleep. He's taken benadryl, Nyquil and another OTC sleep aid which help some. He doesn't nap during the day. The baby is up maybe once during the night. His back pain fluctuates. It's always worse after work. 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 His anxiety is constant. He continues to take Lexapro. He's eating (doesn't always have an appetite) and doesn't think his weight has changed. He's moving his bowels regularly. He saw Dr. Jessica Gann last December. He's due for scans. He hasn't seen his cardiologist in awhile.       Clinical Pain Assessment (nonverbal scale for nonverbal patients):   [++++ Clinical Pain Assessment++++]  [++++Pain Severity++++]: Pain: 7  [++++Pain Character++++]: stabbing pain in back  [++++Pain Duration++++]: months for back pain, weeks for groin pain  [++++Pain Effect++++]: little  [++++Pain Factors++++]: oxycodone helps with back pain, groin pain elicited by standing and walking  [++++Pain Frequency++++]: constant back pain with varying intensity  [++++Pain Location++++]: left lower back  [++++ Clinical Pain Assessment++++]       FUNCTIONAL ASSESSMENT:     Palliative Performance Scale (PPS):  PPS: 70       PSYCHOSOCIAL/SPIRITUAL SCREENING:     Any spiritual / Caodaism concerns:  [] Yes /  [x] No    Caregiver Burnout:  [] Yes /  [x] No /  [] No Caregiver Present      Anticipatory grief assessment:   [x] Normal  / [] Maladaptive       ESAS Anxiety: Anxiety: 10    ESAS Depression: Depression: 0       REVIEW OF SYSTEMS:     The following systems were [x] reviewed / [] unable to be reviewed  Systems: constitutional, ears/nose/mouth/throat, respiratory, gastrointestinal, genitourinary, musculoskeletal, integumentary, neurologic, psychiatric, endocrine. Positive findings noted below. Modified ESAS Completed by: provider   Fatigue: 6 Drowsiness: 4   Depression: 0 Pain: 7   Anxiety: 10 Nausea: 0   Anorexia: 4 Dyspnea: 0   Best Well-Bein Constipation: No              PHYSICAL EXAM:     Wt Readings from Last 3 Encounters:   12/10/20 150 lb 3.2 oz (68.1 kg)   10/21/20 150 lb 9.6 oz (68.3 kg)   20 160 lb (72.6 kg)     There were no vitals taken for this visit. Last bowel movement: See Nursing Note    Constitutional    [] Appears well-developed and well-nourished in no apparent distress    [x] Abnormal: appears fatigued  Mental status  [x] Alert and awake  [x] Oriented to person/place/time  [x] Able to follow commands  [] Abnormal:   Eyes  [x] EOM normal   [x] Sclera normal   [x] No visible ocular discharge  [] Abnormal:   HENT  [x] Normocephalic, atraumatic  [x] Mouth/Throat: Moist mucous membranes   [x] External Ears normal  [] Abnormal:  Neck  [x] No visualized mass  [] Abnormal:  Pulmonary/Chest   [x] Respiratory effort normal  [x] No visualized signs of difficulty breathing or respiratory distress  [] Abnormal:  Musculoskeletal  [x] Normal gait with no signs of ataxia  [x] Normal range of motion of neck  [] Abnormal:  Neurological:   [x] No facial asymmetry (Cranial nerve 7 motor function)  [x] No gaze palsy  [] Abnormal:   Skin  [x] No significant exanthematous lesions or discoloration noted on facial skin  [] Abnormal:                                  Psychiatric  [x] Normal affect  [x] No hallucinations  [] Abnormal:    Other pertinent observable physical exam findings:    Due to this being a TeleHealth evaluation, many elements of the physical examination are unable to be assessed.            HISTORY:     Past Medical History:   Diagnosis Date    Anxiety     Cancer [facility])     lymphoma 2018 receiving chemo    Chronic pain     lower back- lymphoma  Hyperlipidemia     Hypertension     Lymphadenopathy 11/12/2018      Past Surgical History:   Procedure Laterality Date    HX HEART CATHETERIZATION  02/2019    HX OTHER SURGICAL  02/2019    cardiac stent    IR INJECTION PSEUDOANEURYSM  2/26/2019      Family History   Problem Relation Age of Onset    Hypertension Father     Diabetes Father     Diabetes Mother       History reviewed, no pertinent family history. Social History     Tobacco Use    Smoking status: Current Every Day Smoker     Packs/day: 0.50     Years: 20.00     Pack years: 10.00    Smokeless tobacco: Never Used   Substance Use Topics    Alcohol use: No     Frequency: Never     No Known Allergies   Current Outpatient Medications   Medication Sig    [START ON 5/2/2021] oxyCODONE IR (ROXICODONE) 5 mg immediate release tablet Take 1 Tab by mouth every four (4) hours as needed for Pain for up to 30 days. Max Daily Amount: 30 mg.    [START ON 6/1/2021] oxyCODONE IR (ROXICODONE) 5 mg immediate release tablet Take 1 Tab by mouth every four (4) hours as needed for Pain for up to 30 days. Max Daily Amount: 30 mg.    escitalopram oxalate (LEXAPRO) 20 mg tablet TAKE 1 TABLET BY MOUTH EVERY DAY    aspirin 81 mg chewable tablet Take 81 mg by mouth daily.  metoprolol succinate (TOPROL-XL) 50 mg XL tablet TAKE 1 TABLET BY MOUTH EVERY DAY    L. acidoph & paracasei- S therm- Bifido (AUSTIN-Q/RISAQUAD) 8 billion cell cap cap take 1 cap daily    lisinopriL (PRINIVIL, ZESTRIL) 40 mg tablet TAKE 1 TABLET BY MOUTH EVERY DAY    atorvastatin (LIPITOR) 40 mg tablet TAKE 1 TAB BY MOUTH NIGHTLY. INDICATIONS: TREATMENT TO SLOW PROGRESSION OF CORONARY ARTERY DISEASE    clopidogrel (PLAVIX) 75 mg tab 1 Tab by Per NG tube route daily.  potassium chloride (KLOR-CON) 10 mEq tablet Take 1 Tab by mouth daily.  LORazepam (Ativan) 0.5 mg tablet Take 1 Tab by mouth.  predniSONE (DELTASONE) 20 mg tablet take 5 tablet by oral route  every day.  Take on Days 1 through 5 each cycle    prochlorperazine (Compazine) 10 mg tablet Take 1 Tab by mouth.  senna-docusate (PERICOLACE) 8.6-50 mg per tablet Take 1 Tab by mouth.  ondansetron hcl (ZOFRAN) 4 mg tablet Take 2 Tabs by mouth every eight (8) hours as needed for Nausea or Vomiting.  naloxone (NARCAN) 4 mg/actuation nasal spray Use 1 spray intranasally, then discard. Repeat with new spray every 2 min as needed for opioid overdose symptoms, alternating nostrils. No current facility-administered medications for this visit. LAB DATA REVIEWED:     Lab Results   Component Value Date/Time    WBC 7.6 12/10/2020 11:19 AM    HGB 14.2 12/10/2020 11:19 AM    PLATELET 540 18/99/9351 11:19 AM     Lab Results   Component Value Date/Time    Sodium 139 12/10/2020 11:19 AM    Potassium 3.1 (L) 12/10/2020 11:19 AM    Chloride 110 (H) 12/10/2020 11:19 AM    CO2 27 12/10/2020 11:19 AM    BUN 11 12/10/2020 11:19 AM    Creatinine 1.06 12/10/2020 11:19 AM    Calcium 8.6 12/10/2020 11:19 AM    Magnesium 1.7 01/12/2019 04:05 AM    Phosphorus 2.0 (L) 01/12/2019 04:05 AM      Lab Results   Component Value Date/Time    Alk. phosphatase 106 12/10/2020 11:19 AM    Protein, total 6.6 12/10/2020 11:19 AM    Albumin 3.5 12/10/2020 11:19 AM    Globulin 3.1 12/10/2020 11:19 AM     Lab Results   Component Value Date/Time    INR 1.1 02/22/2019 08:18 PM    Prothrombin time 10.8 02/22/2019 08:18 PM    aPTT 27.8 02/22/2019 08:18 PM      No results found for: IRON, FE, TIBC, IBCT, PSAT, FERR       MRI lumbar spine 12/18/19:  Congenital narrowing of the lumbar canal. Vertebral body heights are maintained. Marrow signal is normal.     The conus medullaris terminates at T12/L1. Signal and caliber of the distal  spinal cord are within normal limits.  There is no pathologic intrathecal  enhancement.     The paraspinal soft tissues are within normal limits.     Lower thoracic spine: No herniation or stenosis.     L1-L2: No herniation or stenosis.     L2-L3: No herniation or stenosis.     L3-L4: Mild facet arthropathy. Minimal central disc protrusion. Mild canal  stenosis. Foramina are patent     L4-L5: Desiccation. Mild facet arthropathy. Canal demonstrates minimal stenosis. There is an annular ligament tear far laterally on the left. Mild left foraminal  stenosis.     L5-S1: Mild facet arthropathy. Canal and foramina are patent. IMPRESSION:  Mild disc degenerative change at L3-4 and L4-5.     Mild canal stenosis at L3-4 and mild left foraminal stenosis at L4-5.     Other less severe degenerative findings are as described above. CT chest/abdomen 12/16/19:  FINDINGS:      THYROID: No nodule. MEDIASTINUM: No mass or lymphadenopathy. Port catheter in place on the right. Catheter tip in appropriate position. SB: No mass or lymphadenopathy. THORACIC AORTA: No dissection or aneurysm. MAIN PULMONARY ARTERY: Unremarkable  TRACHEA/BRONCHI: Unremarkable  ESOPHAGUS: No wall thickening or dilatation. HEART: Normal in size. PLEURA: No effusion or pneumothorax. LUNGS: Bibasilar atelectasis is noted. Nicholes Coca LIVER: No mass or biliary dilatation. GALLBLADDER: Layering contrast versus cholelithiasis. SPLEEN: No mass. PANCREAS: No mass or ductal dilatation. ADRENALS: The left adrenal gland is elevated by the retroperitoneal mass. The    right is unremarkable. KIDNEYS: There is diminished soft tissue density at the level of the left renal  hilum. There is increased left renal cortical atrophy. STOMACH: Unremarkable. SMALL BOWEL: No dilatation or wall thickening. COLON: No dilatation or wall thickening. APPENDIX: Unremarkable. PERITONEUM: No ascites or pneumoperitoneum. RETROPERITONEUM:   Diminished size of retroperitoneal mass. Diminished in size with margins difficult to fully ascertain.    2-62 35 x 50 mm previously 71 x 94 mm.     2-67 left periaortic adenopathy 25 x 17 mm  The SMA, celiac, and LIZZY are remain encased, diminished attenuation of these  vessels.      REPRODUCTIVE ORGANS: The prostate and seminal vesicles are unremarkable. URINARY  BLADDER: Unremarkable  BONES: No destructive bone lesion. ADDITIONAL COMMENTS: Resolved left inguinal pseudoaneurysm. .       IMPRESSION:    Baseline research examination. Findings are consistent with interval response to therapy.      Continued diminished size of retroperitoneal mass-adenopathy,  with diminished soft tissue density at the left renal hilum. CONTROLLED SUBSTANCES SAFETY ASSESSMENT (IF ON CONTROLLED SUBSTANCES):     Reviewed opioid safety handout:  [x] Yes   [] No  24 hour opioid dose >150mg morphine equivalent/day:  [] Yes   [x] No  Benzodiazepines:  [x] Yes   [] No  Sleep apnea:  [] Yes   [x] No  Urine Toxicology Testing within last 6 months:  [] Yes   [x] No  History of or new aberrant medication taking behaviors:  [] Yes   [x] No  Has Narcan been prescribed [x] Yes   [] No          Total time:   Counseling / coordination time:   > 50% counseling / coordination?:     Consent:  He and/or health care decision maker is aware that that he may receive a bill for this telehealth service, depending on his insurance coverage, and has provided verbal consent to proceed: Yes    CPT Codes 56507-47736 for Established Patients may apply to this Telehealth VisitPursuant to the emergency declaration under the [address] and the [facility], [facility] waiver authority and the [employer] and [employer] Act, this Virtual  Visit was conducted, with patient's consent, to reduce the patient's risk of exposure to COVID-19 and provide continuity of care for an established patient. Services were provided through a video synchronous discussion virtually to substitute for in-person clinic visit.

## 2021-04-12 ENCOUNTER — OFFICE VISIT (OUTPATIENT)
Dept: PODIATRY | Age: 46
End: 2021-04-12
Payer: COMMERCIAL

## 2021-04-12 VITALS
DIASTOLIC BLOOD PRESSURE: 95 MMHG | SYSTOLIC BLOOD PRESSURE: 145 MMHG | TEMPERATURE: 96.4 F | BODY MASS INDEX: 24.14 KG/M2 | HEIGHT: 67 IN | OXYGEN SATURATION: 100 % | WEIGHT: 153.8 LBS | HEART RATE: 114 BPM

## 2021-04-12 DIAGNOSIS — Z72.0 TOBACCO ABUSE: Primary | ICD-10-CM

## 2021-04-12 DIAGNOSIS — L85.9 HYPERKERATOSIS: ICD-10-CM

## 2021-04-12 PROCEDURE — 99213 OFFICE O/P EST LOW 20 MIN: CPT | Performed by: PODIATRIST

## 2021-04-12 PROCEDURE — 99406 BEHAV CHNG SMOKING 3-10 MIN: CPT | Performed by: PODIATRIST

## 2021-04-12 NOTE — PROGRESS NOTES
1. Have you been to the ER, urgent care clinic since your last visit? Hospitalized since your last visit? No    2. Have you seen or consulted any other health care providers outside of the [address] [place] [address] since your last visit? Include any pap smears or colon screening.  No     Chief Complaint   Patient presents with    Callouses    Foot Pain

## 2021-04-12 NOTE — PROGRESS NOTES
[facility]    Subjective:         Patient is a 39 y.o. male who is being seen as a returning pt for bilateral foot pain. Patient states he has been suffering the pain for the past few months with an acute exacerbation of his pain. He states the pain is located where dense calluses have formed. He states the pain rises to the level of 6 out of 10. He states the pain is exacerbated with weightbearing. He denies any recent trauma. He denies any breaks in the skin. He denies any local/systemic signs of infection. He states she takes narcotics for chronic pain management as prescribed by pain management physician. He denies any other pedal complaints    Past Medical History:   Diagnosis Date    Anxiety     Cancer [facility])     lymphoma Nov 2018 receiving chemo    Chronic pain     lower back- lymphoma    Hyperlipidemia     Hypertension     Lymphadenopathy 11/12/2018     Past Surgical History:   Procedure Laterality Date    HX HEART CATHETERIZATION  02/2019    HX OTHER SURGICAL  02/2019    cardiac stent    IR INJECTION PSEUDOANEURYSM  2/26/2019       Family History   Problem Relation Age of Onset    Hypertension Father     Diabetes Father     Diabetes Mother       Social History     Tobacco Use    Smoking status: Current Every Day Smoker     Packs/day: 0.50     Years: 20.00     Pack years: 10.00    Smokeless tobacco: Never Used   Substance Use Topics    Alcohol use: No     Frequency: Never     No Known Allergies  Prior to Admission medications    Medication Sig Start Date End Date Taking? Authorizing Provider   oxyCODONE IR (ROXICODONE) 5 mg immediate release tablet Take 1 Tab by mouth every four (4) hours as needed for Pain for up to 30 days. Max Daily Amount: 30 mg. 6/1/21 7/1/21 Yes Bentley Montalvo MD   potassium chloride (KLOR-CON) 10 mEq tablet Take 1 Tab by mouth daily.  2/12/21  Yes Rhonda Blake NP   escitalopram oxalate (LEXAPRO) 20 mg tablet TAKE 1 TABLET BY MOUTH EVERY DAY 2/11/21  Yes Bentley Montalvo MD   aspirin 81 mg chewable tablet Take 81 mg by mouth daily. Yes Provider, Historical   metoprolol succinate (TOPROL-XL) 50 mg XL tablet TAKE 1 TABLET BY MOUTH EVERY DAY 1/5/21  Yes Niraj Amador MD L. acidoph & paracasei- S therm- Bifido (AUSTIN-Q/RISAQUAD) 8 billion cell cap cap take 1 cap daily   Yes Provider, Historical   LORazepam (Ativan) 0.5 mg tablet Take 1 Tab by mouth. Yes Provider, Historical   prochlorperazine (Compazine) 10 mg tablet Take 1 Tab by mouth. Yes Provider, Historical   senna-docusate (PERICOLACE) 8.6-50 mg per tablet Take 1 Tab by mouth. Yes Provider, Historical   lisinopriL (PRINIVIL, ZESTRIL) 40 mg tablet TAKE 1 TABLET BY MOUTH EVERY DAY 12/8/20  Yes Robinett, Jeremiah Pallas, NP   ondansetron hcl (ZOFRAN) 4 mg tablet Take 2 Tabs by mouth every eight (8) hours as needed for Nausea or Vomiting. 4/25/20  Yes Cristi Hannah MD   atorvastatin (LIPITOR) 40 mg tablet TAKE 1 TAB BY MOUTH NIGHTLY. INDICATIONS: TREATMENT TO SLOW PROGRESSION OF CORONARY ARTERY DISEASE 3/31/20  Yes Toribio Stanley NP   naloxone [facility]) 4 mg/actuation nasal spray Use 1 spray intranasally, then discard. Repeat with new spray every 2 min as needed for opioid overdose symptoms, alternating nostrils. 1/8/20  Yes Devon Montalvo MD   clopidogrel (PLAVIX) 75 mg tab 1 Tab by Per NG tube route daily. 2/18/19  Yes Quintin Mayberry MD   predniSONE (DELTASONE) 20 mg tablet take 5 tablet by oral route  every day. Take on Days 1 through 5 each cycle    Provider, Historical       Review of Systems   Eyes: Negative. Respiratory: Negative. Endocrine: Negative. Genitourinary: Negative. Musculoskeletal: Positive for arthralgias. Allergic/Immunologic: Negative. Neurological: Negative. Hematological: Negative. Psychiatric/Behavioral: Negative. All other systems reviewed and are negative.       Objective:     Visit Vitals  BP (!) 145/95 (BP 1 Location: Left upper arm, BP Patient Position: Sitting, BP Cuff Size: Adult)   Pulse (!) 114   Temp (!) 96.4 °F (35.8 °C) (Temporal)   Ht 5' 7\" (1.702 m)   Wt 153 lb 12.8 oz (69.8 kg)   SpO2 100%   BMI 24.09 kg/m²       Physical Exam  Vitals signs reviewed. Constitutional:       Appearance: He is normal weight. Cardiovascular:      Pulses:           Dorsalis pedis pulses are 2+ on the right side and 2+ on the left side. Posterior tibial pulses are 2+ on the right side and 2+ on the left side. Pulmonary:      Effort: Pulmonary effort is normal.   Musculoskeletal:      Right lower leg: No edema. Left lower leg: No edema. Right foot: Normal range of motion. Prominent metatarsal heads present. No deformity. Left foot: Normal range of motion. Prominent metatarsal heads present. No deformity. Feet:      Right foot:      Protective Sensation: 10 sites tested. 10 sites sensed. Skin integrity: Callus present. Toenail Condition: Right toenails are normal.      Left foot:      Skin integrity: Callus present. Toenail Condition: Left toenails are normal.   Lymphadenopathy:      Lower Body: No right inguinal adenopathy. No left inguinal adenopathy. Skin:     General: Skin is warm. Capillary Refill: Capillary refill takes 2 to 3 seconds. Neurological:      Mental Status: He is alert and oriented to person, place, and time. Psychiatric:         Mood and Affect: Mood and affect normal.         Behavior: Behavior is cooperative. The patient was counseled on the dangers of tobacco use, and was advised to quit. Reviewed strategies to maximize success, including removing cigarettes and smoking materials from environment. Data Review: No results found for this or any previous visit (from the past 24 hour(s)). Impression:       ICD-10-CM ICD-9-CM    1. Tobacco abuse  Z72.0 305.1    2.  Hyperkeratosis  L85.9 701.1          Recommendation:     Patient seen and evaluated in the office  Discussed educated patient regarding his current medical condition   Instructed patient he needs to limit focal pressure and shear forces to prevent the lesions from returning  Offloading felt metatarsal pads applied to shoe gear for symptomatic relief. A prescription was given for patient to be fitted for custom inserts from [email or web address]  Lastly, smoking cessation counseling was provided for a total of 6 minutes. Instructed patient that his habit was increasing his callus formation.   Patient verbalized understanding

## 2021-04-20 ENCOUNTER — TELEPHONE (OUTPATIENT)
Dept: ONCOLOGY | Age: 46
End: 2021-04-20

## 2021-04-20 NOTE — TELEPHONE ENCOUNTER
[facility] [place]  [phone]        04/20/21 11:47 AM Attempted to call patient via home/cell number listed. No answer, left message requesting return call. Per chart review, patient has not scheduled port removal. Last port flush/lab appointment was 12/10/21. Per Dustin Longoria, patient should come in this month, if possible, for port flush and labs. Would also like to remind patient he can schedule to have port removed and provide him with [facility] phone number. Provided office phone number for call back. 04/29/21 10:53 AM Called patient and advised of above. Patient would like to proceed with scheduling port removal at this time. He requested that [facility] call him directly, specifying it is best to call him midday (around 11 AM). Message sent to [facility]. Updated NP of above as well.

## 2021-04-27 NOTE — TELEPHONE ENCOUNTER
Attempted to call patient via home/cell number listed. No answer, left message requesting return call.

## 2021-05-03 NOTE — TELEPHONE ENCOUNTER
02/25/19 9:42 AM Verified ID x 2. Discussed recent arterial ultrasound. Mr. Ana Lane told me he was discharged and told he has a \"hematoma. \" He has follow up with cardiology tomorrow (2/26/19). He reports the swelling in his leg has stayed about the same and the pain has improved. Notified patient to call me if the swelling increases. Patient verbalized understanding. No further questions at this time. Double O-Z Flap Text: The defect edges were debeveled with a #15 scalpel blade.  Given the location of the defect, shape of the defect and the proximity to free margins a Double O-Z flap was deemed most appropriate.  Using a sterile surgical marker, an appropriate transposition flap was drawn incorporating the defect and placing the expected incisions within the relaxed skin tension lines where possible. The area thus outlined was incised deep to adipose tissue with a #15 scalpel blade.  The skin margins were undermined to an appropriate distance in all directions utilizing iris scissors.

## 2021-05-07 NOTE — PROGRESS NOTES
4:49 PM    Attempted to call and speak with patient to verify arrival time, to remain NPO after midnight, and  for transportation home. No answer. Left voicemail with above information and call back number.

## 2021-05-10 ENCOUNTER — HOSPITAL ENCOUNTER (OUTPATIENT)
Dept: INTERVENTIONAL RADIOLOGY/VASCULAR | Age: 46
Discharge: HOME | End: 2021-05-10
Attending: NURSE PRACTITIONER

## 2021-05-27 ENCOUNTER — APPOINTMENT (OUTPATIENT)
Dept: INFUSION THERAPY | Age: 46
End: 2021-05-27

## 2021-06-04 ENCOUNTER — TELEPHONE (OUTPATIENT)
Dept: PALLATIVE CARE | Age: 46
End: 2021-06-04

## 2021-06-04 DIAGNOSIS — G89.29 CHRONIC LEFT-SIDED LOW BACK PAIN WITHOUT SCIATICA: ICD-10-CM

## 2021-06-04 DIAGNOSIS — M54.50 CHRONIC LEFT-SIDED LOW BACK PAIN WITHOUT SCIATICA: ICD-10-CM

## 2021-06-04 RX ORDER — OXYCODONE HYDROCHLORIDE 5 MG/1
5 TABLET ORAL
Qty: 80 TABLET | Refills: 0 | Status: SHIPPED | OUTPATIENT
Start: 2021-06-04 | End: 2021-06-10 | Stop reason: SDUPTHER

## 2021-06-04 NOTE — TELEPHONE ENCOUNTER
The patient states a prescription was supposed to be called in for Oxycodone. He called [facility] and there is no record of it. He is down to 3 pills.

## 2021-06-04 NOTE — TELEPHONE ENCOUNTER
Oxycodone IR 5 mg sent via escript on 6/1/2021. Call placed to [facility] pharmacy, pharmacist indicates they did not receive prescription.  Will have Dr. Zelda cody

## 2021-06-10 ENCOUNTER — VIRTUAL VISIT (OUTPATIENT)
Dept: PALLATIVE CARE | Age: 46
End: 2021-06-10
Payer: COMMERCIAL

## 2021-06-10 ENCOUNTER — TELEPHONE (OUTPATIENT)
Dept: ONCOLOGY | Age: 46
End: 2021-06-10

## 2021-06-10 DIAGNOSIS — C82.33 FOLLICULAR LYMPHOMA GRADE IIIA OF INTRA-ABDOMINAL LYMPH NODES (HCC): ICD-10-CM

## 2021-06-10 DIAGNOSIS — R63.0 POOR APPETITE: ICD-10-CM

## 2021-06-10 DIAGNOSIS — M54.50 CHRONIC LEFT-SIDED LOW BACK PAIN WITHOUT SCIATICA: Primary | ICD-10-CM

## 2021-06-10 DIAGNOSIS — R53.83 OTHER FATIGUE: ICD-10-CM

## 2021-06-10 DIAGNOSIS — F32.9 REACTIVE DEPRESSION: ICD-10-CM

## 2021-06-10 DIAGNOSIS — G89.29 CHRONIC LEFT-SIDED LOW BACK PAIN WITHOUT SCIATICA: Primary | ICD-10-CM

## 2021-06-10 DIAGNOSIS — F41.9 ANXIETY: ICD-10-CM

## 2021-06-10 PROCEDURE — 99214 OFFICE O/P EST MOD 30 MIN: CPT | Performed by: INTERNAL MEDICINE

## 2021-06-10 RX ORDER — OXYCODONE HYDROCHLORIDE 5 MG/1
5 TABLET ORAL
Qty: 80 TABLET | Refills: 0 | Status: SHIPPED | OUTPATIENT
Start: 2021-09-01 | End: 2021-09-02 | Stop reason: SDUPTHER

## 2021-06-10 RX ORDER — OXYCODONE HYDROCHLORIDE 5 MG/1
5 TABLET ORAL
Qty: 80 TABLET | Refills: 0 | Status: SHIPPED | OUTPATIENT
Start: 2021-07-03 | End: 2021-08-02

## 2021-06-10 RX ORDER — OXYCODONE HYDROCHLORIDE 5 MG/1
5 TABLET ORAL
Qty: 80 TABLET | Refills: 0 | Status: SHIPPED | OUTPATIENT
Start: 2021-08-02 | End: 2021-09-01

## 2021-06-10 RX ORDER — TRAZODONE HYDROCHLORIDE 50 MG/1
50 TABLET ORAL
COMMUNITY
End: 2022-04-20

## 2021-06-10 NOTE — PATIENT INSTRUCTIONS
Dear Venkata Enriquez. ,    It was a pleasure seeing you today via televisit. We will see you again on Matilde 10, 2021 to coordinate with your return to see Dr. Candice Thibodeaux      If labs or imaging tests have been ordered for you today, please call the office at [phone] 48 hours after completion to obtain the results. Your described symptoms were: Fatigue: 4 Drowsiness: 4   Depression: 7 Pain: 5   Anxiety: 10 Nausea: 0   Anorexia: 2 Dyspnea: 0   Best Well-Bein Constipation: No   Other Problem (Comment): 0       This is the plan we talked about:      1. Back pain   -Continue oxycodone 5-mg every 4 hours as needed  -Today I sent another 3 1-month oxycodone prescriptions (#80) to your pharmacy for pick-up on 7/3, ,   -Continue tylenol as needed for moderate pain  -Continue heat or ice as needed  -Avoid taking ibuprofen, naprosyn or other any over-the-counter pain relievers which may interact with your blood thinner. 2. Anxiety/depression  -Continue escitalopram to 20-mg daily  -You've met with our clinical , Vernon Asencio, in jorge a past    3. 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 Poor appetite  -You're eating and you haven't lost any weight    5. Lymphoma  -You saw Dr. Candice Thibodeaux last month  -Len Persaud going to have port removed!  -You anticipate having repeat scans later this month    6. Primary care  -You're now being followed by Dr. Cristian Ocasio    This is what you have shared with us about Advance Care Planning:      Primary Decision Maker: [de-identified] - Ex-Spouse - [phone]  [] Named in a scanned document   [x] Legal Next of Kin  [] Guardian    ACP documents you current have include:  [] Advance Directive or Living Will  [] Durable Do Not Resuscitate  [] Physician Orders for Scope of Treatment (POST)  [] Medical Power of   [] Other      The Palliative Medicine Team is here to support you and your family. Sincerely,      Soha Muñoz MD and the Palliative Medicine Team

## 2021-06-10 NOTE — PROGRESS NOTES
Palliative Medicine Office Visit  Palliative Medicine Nurse Check In  [phone]    Patient Name: Hailey Ashby. YOB: 1975      Date of Office Visit: 6/10/2021    Patient states: \"  \"    From Check In Sheet (scanned in Media):  Has a medical provider talked with you about cardiopulmonary resuscitation (CPR)? [x] Yes   [] No   [] Unable to obtain    Nurse reminder to complete or update ACP FlowSheet:    Is ACP on the Problem List?    [] Yes    [x] No  IF ACP Document is ON FILE; Nurse to place ACP on Problem List     Is there an ACP Note in Chart Review/Note? [x] Yes    [] No   If NO: ALERT PROVIDER       Primary Decision Maker: [de-identified] - Ex-Spouse - [phone]  Advance Care Planning 6/10/2021   Patient's Healthcare Decision Maker is: Verbal statement (Legal Next of Kin remains as decision maker)   Confirm Advance Directive None   Patient Would Like to Complete Advance Directive -   Does the patient have other document types -         Is there anything that we should know about you as a person in order to provide you the best care possible? Have you been to the ER, urgent care clinic since your last visit? [] Yes   [x] No   [] Unable to obtain    Have you been hospitalized since your last visit? [] Yes   [x] No   [] Unable to obtain    Have you seen or consulted any other health care providers outside of the [address] [place] [address] since your last visit?    [] Yes   [x] No   [] Unable to obtain    Functional status (describe):         Last BM: 6/10/2021     accessed (date): 6/10/2021    Bottle review (for opioid pain medication):  Medication 1:   Date filled:   Directions:   # filled:   # left:   # pills taking per day:  Last dose taken:    Medication 2:   Date filled:   Directions:   # filled:   # left:   # pills taking per day:  Last dose taken:    Medication 3:   Date filled:   Directions:   # filled:   # left:   # pills taking per day:  Last dose taken:     Medication 4:   Date filled:   Directions:   # filled:   # left:   # pills taking per day:  Last dose taken:

## 2021-06-10 NOTE — PROGRESS NOTES
Palliative Medicine Outpatient Services  [place]: [phone] ([phone])    Patient Name: Cady Hopkins YOB: 1975    Date of Current Visit: 06/10/21  Location of Current Visit:    [] [facility] Office  [] [facility] Office  [] [address] [place] [address]  [] Home  [x] Other:  televisit    Date of Initial Visit: 2/19/19   Referral from: Mahnaz Rocha MD  Primary Care Physician: Sosa Tolentino MD      SUMMARY:   Cady Hopkins is a 39y.o. year old with high-grade follicular lymphoma, who was referred to Palliative Medicine by Dr. Pearl De La Cruz for symptom management and supportive care. He was diagnosed in 11/2018 after presenting with back pain. He is currently receiving systemic chemotherapy. He suffered cardiac arrest during cycle #3 due to previously undiagnosed severe stenosis of the LAD s/p PTCA and stent. He has since completed systemic chemotherapy. His most recent PET-CT (5/2019) showed no focal areas of increased hypermetabolic uptake. The patients social history includes: he is single. He lives in [place] with his current girlfriend and their infant son. Radha Hill He has 3 teenage children who live with their mother and with whom he has close contact. 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 Palliative Medicine is addressing the following current patient/family concerns: anxiety, depression, left mid- and low back pain, poor appetite, fatigue, advanced care planning. Initial Referral Intake note from Estefanía Calvin RN reviewed prior to visit   PALLIATIVE DIAGNOSES:       ICD-10-CM ICD-9-CM    1. Chronic left-sided low back pain without sciatica  M54.5 724.2 oxyCODONE IR (ROXICODONE) 5 mg immediate release tablet    G89.29 338.29 oxyCODONE IR (ROXICODONE) 5 mg immediate release tablet      oxyCODONE IR (ROXICODONE) 5 mg immediate release tablet   2. Anxiety  F41.9 300.00    3. Reactive depression  F32.9 300.4    4. Other fatigue  R53.83 780.79    5. Poor appetite  R63.0 783.0    6.  Follicular lymphoma grade IIIa of intra-abdominal lymph nodes [facility])  C82.33 202.03           PLAN:   Patient Instructions       Dear Denisse Seth. ,    It was a pleasure seeing you today via televisit. We will see you again on Matilde 10, 2021 to coordinate with your return to see Dr. Ginny Baxter      If labs or imaging tests have been ordered for you today, please call the office at [phone] 48 hours after completion to obtain the results. Your described symptoms were: Fatigue: 4 Drowsiness: 4   Depression: 7 Pain: 5   Anxiety: 10 Nausea: 0   Anorexia: 2 Dyspnea: 0   Best Well-Bein Constipation: No   Other Problem (Comment): 0       This is the plan we talked about:      1. Back pain   -Continue oxycodone 5-mg every 4 hours as needed  -Today I sent another 3 1-month oxycodone prescriptions (#80) to your pharmacy for pick-up on 7/3, ,   -Continue tylenol as needed for moderate pain  -Continue heat or ice as needed  -Avoid taking ibuprofen, naprosyn or other any over-the-counter pain relievers which may interact with your blood thinner. 2. Anxiety/depression  -Continue escitalopram to 20-mg daily  -You've met with our clinical , Wesly Jon, in [place] a past    3. Fatigue  -This is chronic and unchanged  -You continue to work full-time as a manager at [employer]    4. Poor appetite  -You're eating and you haven't lost any weight    5. Lymphoma  -You saw Dr. Ginny Baxter last month  -Sharonda Sifuentes going to have port removed!  -You anticipate having repeat scans later this month    6.  Primary care  -You're now being followed by Dr. Nicole Harden    This is what you have shared with us about Advance Care Planning:      Primary Decision Maker: [de-identified] - Ex-Spouse - [phone]  [] Named in a scanned document   [x] Legal Next of Kin  [] Guardian    ACP documents you current have include:  [] Advance Directive or Living Will  [] Durable Do Not Resuscitate  [] Physician Orders for Scope of Treatment (POST)  [] Medical Power of   [] Other      The Palliative Medicine Team is here to support you and your family. Sincerely,      Meryle Drones, MD and the Palliative Medicine Team                        GOALS OF CARE / TREATMENT PREFERENCES:   [====Goals of Care====]  GOALS OF CARE:  Patient / health care proxy stated goals: See Patient Instructions / Summary    TREATMENT PREFERENCES:   Code Status:  [x] Attempt Resuscitation       [] Do Not Attempt Resuscitation    Advance Care Planning:  [x] The [facility] Inter[facility] Team has updated the ACP Navigator with Decision Maker and Patient Capacity      Primary Decision Maker: [de-identified] - Ex-Spouse - [phone]  [] Named in a scanned document   [x] Legal Next of Kin  [] Guardian    Other:  (If patient appropriate for POST, consider using PALLPOST smart phrase here)    The palliative care team has discussed with patient / health care proxy about goals of care / treatment preferences for patient.  [====Goals of Care====]     PRESCRIPTIONS GIVEN:     Medications Ordered Today   Medications    oxyCODONE IR (ROXICODONE) 5 mg immediate release tablet     Sig: Take 1 Tablet by mouth every four (4) hours as needed for Pain for up to 30 days. Max Daily Amount: 30 mg. Dispense:  80 Tablet     Refill:  0    oxyCODONE IR (ROXICODONE) 5 mg immediate release tablet     Sig: Take 1 Tablet by mouth every four (4) hours as needed for Pain for up to 30 days. Max Daily Amount: 30 mg. Dispense:  80 Tablet     Refill:  0    oxyCODONE IR (ROXICODONE) 5 mg immediate release tablet     Sig: Take 1 Tablet by mouth every four (4) hours as needed for Pain for up to 30 days. Max Daily Amount: 30 mg. Dispense:  80 Tablet     Refill:  0           FOLLOW UP:     No future appointments.         PHYSICIANS INVOLVED IN CARE:   Patient Care Team:  Nasima Redman MD as PCP - General (Family Medicine)  Marcos Álvarez MD (Hematology and Oncology)  Meryle Drones, MD as Physician (Palliative Medicine)  Gianluca Conway MD as Physician (Palliative Medicine)       HISTORY:   Reviewed patient-completed ESAS and advance care planning form. Reviewed patient record in prescription monitoring program.    CHIEF COMPLAINT:   Chief Complaint   Patient presents with    Back Pain       HPI/SUBJECTIVE:    The patient is: [x] Verbal / [] Nonverbal     His back pain is about the same, worse when he's working due to being on his feet so much. 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 He's been under a lot of stress at work. 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 His anxiety is constant. He continues to take Lexapro. He \"just deals with it\" when he feels more stressed. He's eating (doesn't always have an appetite) and doesn't think his weight has changed. He's moving his bowels regularly. He was scheduled for a follow-up appointment with Dr. Karely Robert this morning and forgot about it.         Clinical Pain Assessment (nonverbal scale for nonverbal patients):   [++++ Clinical Pain Assessment++++]  [++++Pain Severity++++]: Pain: 5  [++++Pain Character++++]: stabbing pain in back  [++++Pain Duration++++]: months for back pain, weeks for groin pain  [++++Pain Effect++++]: little  [++++Pain Factors++++]: oxycodone helps with back pain, groin pain elicited by standing and walking  [++++Pain Frequency++++]: constant back pain with varying intensity  [++++Pain Location++++]: left lower back  [++++ Clinical Pain Assessment++++]       FUNCTIONAL ASSESSMENT:     Palliative Performance Scale (PPS):  PPS: 70       PSYCHOSOCIAL/SPIRITUAL SCREENING:     Any spiritual / Bahai concerns:  [] Yes /  [x] No    Caregiver Burnout:  [] Yes /  [x] No /  [] No Caregiver Present      Anticipatory grief assessment:   [x] Normal  / [] Maladaptive       ESAS Anxiety: Anxiety: 10    ESAS Depression: Depression: 7       REVIEW OF SYSTEMS:     The following systems were [x] reviewed / [] unable to be reviewed  Systems: constitutional, ears/nose/mouth/throat, respiratory, gastrointestinal, genitourinary, musculoskeletal, integumentary, neurologic, psychiatric, endocrine. Positive findings noted below. Modified ESAS Completed by: provider   Fatigue: 4 Drowsiness: 4   Depression: 7 Pain: 5   Anxiety: 10 Nausea: 0   Anorexia: 2 Dyspnea: 0   Best Well-Bein Constipation: No   Other Problem (Comment): 0          PHYSICAL EXAM:     Wt Readings from Last 3 Encounters:   21 153 lb 12.8 oz (69.8 kg)   12/10/20 150 lb 3.2 oz (68.1 kg)   10/21/20 150 lb 9.6 oz (68.3 kg)     There were no vitals taken for this visit. Last bowel movement: See Nursing Note    Constitutional    [] Appears well-developed and well-nourished in no apparent distress    [x] Abnormal: appears fatigued  Mental status  [x] Alert and awake  [x] Oriented to person/place/time  [x] Able to follow commands  [] Abnormal:   Eyes  [x] EOM normal   [x] Sclera normal   [x] No visible ocular discharge  [] Abnormal:   HENT  [x] Normocephalic, atraumatic  [x] Mouth/Throat: Moist mucous membranes   [x] External Ears normal  [] Abnormal:  Neck  [x] No visualized mass  [] Abnormal:  Pulmonary/Chest   [x] Respiratory effort normal  [x] No visualized signs of difficulty breathing or respiratory distress  [] Abnormal:  Musculoskeletal  [x] Normal gait with no signs of ataxia  [x] Normal range of motion of neck  [] Abnormal:  Neurological:   [x] No facial asymmetry (Cranial nerve 7 motor function)  [x] No gaze palsy  [] Abnormal:   Skin  [x] No significant exanthematous lesions or discoloration noted on facial skin  [] Abnormal:                                  Psychiatric  [x] Normal affect  [x] No hallucinations  [] Abnormal:    Other pertinent observable physical exam findings:    Due to this being a TeleHealth evaluation, many elements of the physical examination are unable to be assessed.            HISTORY: Past Medical History:   Diagnosis Date    Anxiety     Cancer [facility])     lymphoma Nov 2018 receiving chemo    Chronic pain     lower back- lymphoma    Hyperlipidemia     Hypertension     Lymphadenopathy 11/12/2018      Past Surgical History:   Procedure Laterality Date    HX HEART CATHETERIZATION  02/2019    HX OTHER SURGICAL  02/2019    cardiac stent    IR INJECTION PSEUDOANEURYSM  2/26/2019      Family History   Problem Relation Age of Onset    Hypertension Father     Diabetes Father     Diabetes Mother       History reviewed, no pertinent family history. Social History     Tobacco Use    Smoking status: Current Every Day Smoker     Packs/day: 0.50     Years: 20.00     Pack years: 10.00    Smokeless tobacco: Never Used   Substance Use Topics    Alcohol use: No     No Known Allergies   Current Outpatient Medications   Medication Sig    traZODone (DESYREL) 50 mg tablet Take 50 mg by mouth nightly. Take 50 mg nightly    [START ON 7/3/2021] oxyCODONE IR (ROXICODONE) 5 mg immediate release tablet Take 1 Tablet by mouth every four (4) hours as needed for Pain for up to 30 days. Max Daily Amount: 30 mg.    [START ON 8/2/2021] oxyCODONE IR (ROXICODONE) 5 mg immediate release tablet Take 1 Tablet by mouth every four (4) hours as needed for Pain for up to 30 days. Max Daily Amount: 30 mg.    [START ON 9/1/2021] oxyCODONE IR (ROXICODONE) 5 mg immediate release tablet Take 1 Tablet by mouth every four (4) hours as needed for Pain for up to 30 days. Max Daily Amount: 30 mg.  potassium chloride (KLOR-CON) 10 mEq tablet Take 1 Tab by mouth daily.  escitalopram oxalate (LEXAPRO) 20 mg tablet TAKE 1 TABLET BY MOUTH EVERY DAY    aspirin 81 mg chewable tablet Take 81 mg by mouth daily.     metoprolol succinate (TOPROL-XL) 50 mg XL tablet TAKE 1 TABLET BY MOUTH EVERY DAY    L. acidoph & paracasei- S therm- Bifido (AUSTIN-Q/RISAQUAD) 8 billion cell cap cap take 1 cap daily    lisinopriL (PRINIVIL, ZESTRIL) 40 mg tablet TAKE 1 TABLET BY MOUTH EVERY DAY    atorvastatin (LIPITOR) 40 mg tablet TAKE 1 TAB BY MOUTH NIGHTLY. INDICATIONS: TREATMENT TO SLOW PROGRESSION OF CORONARY ARTERY DISEASE    clopidogrel (PLAVIX) 75 mg tab 1 Tab by Per NG tube route daily.  LORazepam (Ativan) 0.5 mg tablet Take 1 Tab by mouth. (Patient not taking: Reported on 6/10/2021)    predniSONE (DELTASONE) 20 mg tablet take 5 tablet by oral route  every day. Take on Days 1 through 5 each cycle (Patient not taking: Reported on 6/10/2021)    prochlorperazine (Compazine) 10 mg tablet Take 1 Tab by mouth. (Patient not taking: Reported on 6/10/2021)    senna-docusate (PERICOLACE) 8.6-50 mg per tablet Take 1 Tab by mouth. (Patient not taking: Reported on 6/10/2021)    ondansetron hcl (ZOFRAN) 4 mg tablet Take 2 Tabs by mouth every eight (8) hours as needed for Nausea or Vomiting. (Patient not taking: Reported on 6/10/2021)    naloxone [facility]) 4 mg/actuation nasal spray Use 1 spray intranasally, then discard. Repeat with new spray every 2 min as needed for opioid overdose symptoms, alternating nostrils. No current facility-administered medications for this visit. LAB DATA REVIEWED:     Lab Results   Component Value Date/Time    WBC 7.6 12/10/2020 11:19 AM    HGB 14.2 12/10/2020 11:19 AM    PLATELET 196 28/69/3264 11:19 AM     Lab Results   Component Value Date/Time    Sodium 139 12/10/2020 11:19 AM    Potassium 3.1 (L) 12/10/2020 11:19 AM    Chloride 110 (H) 12/10/2020 11:19 AM    CO2 27 12/10/2020 11:19 AM    BUN 11 12/10/2020 11:19 AM    Creatinine 1.06 12/10/2020 11:19 AM    Calcium 8.6 12/10/2020 11:19 AM    Magnesium 1.7 01/12/2019 04:05 AM    Phosphorus 2.0 (L) 01/12/2019 04:05 AM      Lab Results   Component Value Date/Time    Alk.  phosphatase 106 12/10/2020 11:19 AM    Protein, total 6.6 12/10/2020 11:19 AM    Albumin 3.5 12/10/2020 11:19 AM    Globulin 3.1 12/10/2020 11:19 AM     Lab Results   Component Value Date/Time    INR 1.1 02/22/2019 08:18 PM    Prothrombin time 10.8 02/22/2019 08:18 PM    aPTT 27.8 02/22/2019 08:18 PM      No results found for: IRON, FE, TIBC, IBCT, PSAT, FERR       MRI lumbar spine 12/18/19:  Congenital narrowing of the lumbar canal. Vertebral body heights are maintained. Marrow signal is normal.     The conus medullaris terminates at T12/L1. Signal and caliber of the distal  spinal cord are within normal limits. There is no pathologic intrathecal  enhancement.     The paraspinal soft tissues are within normal limits.     Lower thoracic spine: No herniation or stenosis.     L1-L2: No herniation or stenosis.     L2-L3: No herniation or stenosis.     L3-L4: Mild facet arthropathy. Minimal central disc protrusion. Mild canal  stenosis. Foramina are patent     L4-L5: Desiccation. Mild facet arthropathy. Canal demonstrates minimal stenosis. There is an annular ligament tear far laterally on the left. Mild left foraminal  stenosis.     L5-S1: Mild facet arthropathy. Canal and foramina are patent. IMPRESSION:  Mild disc degenerative change at L3-4 and L4-5.     Mild canal stenosis at L3-4 and mild left foraminal stenosis at L4-5.     Other less severe degenerative findings are as described above. CT chest/abdomen 12/16/19:  FINDINGS:      THYROID: No nodule. MEDIASTINUM: No mass or lymphadenopathy. Port catheter in place on the right. Catheter tip in appropriate position. SB: No mass or lymphadenopathy. THORACIC AORTA: No dissection or aneurysm. MAIN PULMONARY ARTERY: Unremarkable  TRACHEA/BRONCHI: Unremarkable  ESOPHAGUS: No wall thickening or dilatation. HEART: Normal in size. PLEURA: No effusion or pneumothorax. LUNGS: Bibasilar atelectasis is noted. [place] [facility] LIVER: No mass or biliary dilatation. GALLBLADDER: Layering contrast versus cholelithiasis. SPLEEN: No mass. PANCREAS: No mass or ductal dilatation.     ADRENALS: The left adrenal gland is elevated by the retroperitoneal mass. The    right is unremarkable. KIDNEYS: There is diminished soft tissue density at the level of the left renal  hilum. There is increased left renal cortical atrophy. STOMACH: Unremarkable. SMALL BOWEL: No dilatation or wall thickening. COLON: No dilatation or wall thickening. APPENDIX: Unremarkable. PERITONEUM: No ascites or pneumoperitoneum. RETROPERITONEUM:   Diminished size of retroperitoneal mass. Diminished in size with margins difficult to fully ascertain. 2-62 35 x 50 mm previously 71 x 94 mm.     2-67 left periaortic adenopathy 25 x 17 mm  The SMA, celiac, and LIZZY are remain encased, diminished attenuation of these  vessels.      REPRODUCTIVE ORGANS: The prostate and seminal vesicles are unremarkable. URINARY  BLADDER: Unremarkable  BONES: No destructive bone lesion. ADDITIONAL COMMENTS: Resolved left inguinal pseudoaneurysm. .       IMPRESSION:    Baseline research examination. Findings are consistent with interval response to therapy.      Continued diminished size of retroperitoneal mass-adenopathy,  with diminished soft tissue density at the left renal hilum.          CONTROLLED SUBSTANCES SAFETY ASSESSMENT (IF ON CONTROLLED SUBSTANCES):     Reviewed opioid safety handout:  [x] Yes   [] No  24 hour opioid dose >150mg morphine equivalent/day:  [] Yes   [x] No  Benzodiazepines:  [x] Yes   [] No  Sleep apnea:  [] Yes   [x] No  Urine Toxicology Testing within last 6 months:  [] Yes   [x] No  History of or new aberrant medication taking behaviors:  [] Yes   [x] No  Has Narcan been prescribed [x] Yes   [] No          Total time:   Counseling / coordination time:   > 50% counseling / coordination?:     Consent:  He and/or health care decision maker is aware that that he may receive a bill for this telehealth service, depending on his insurance coverage, and has provided verbal consent to proceed: Yes    CPT Codes 13557-52053 for Established Patients may apply to this Telehealth VisitPursuant to the emergency declaration under the [facility], [address] [place] [address] authority and the [employer] Act, this Virtual  Visit was conducted, with patient's consent, to reduce the patient's risk of exposure to COVID-19 and provide continuity of care for an established patient. Services were provided through a video synchronous discussion virtually to substitute for in-person clinic visit.

## 2021-07-06 ENCOUNTER — TELEPHONE (OUTPATIENT)
Dept: PALLATIVE CARE | Age: 46
End: 2021-07-06

## 2021-07-06 NOTE — TELEPHONE ENCOUNTER
Call placed to the pharmacy, prescription rejecting for quantity limitation.  Will reach out to the [facility]

## 2021-07-06 NOTE — TELEPHONE ENCOUNTER
Spoke with patient, indicated that a PA was initiated for quantity limitation.  Awaiting decision from insurer

## 2021-07-06 NOTE — TELEPHONE ENCOUNTER
Patient calling asking for a call back. States his insurance will not cover his oxycodone. Requesting a call back to discuss.

## 2021-07-07 NOTE — TELEPHONE ENCOUNTER
Call placed to [facility] to check the status of PA. Duplicate PA resent to [facility] for processing. Per [employer] unable to view processing inputs by cover my meds. However he indicated that it is more than likely processing. Will check back on the status tomorrow morning. Call placed to patient for notification, no answer.  LM to with above information

## 2021-07-07 NOTE — TELEPHONE ENCOUNTER
Patient calling to state that insurance company told him they never received a request from us regarding PA for medication. Advised would let nurse know and she would call insurance company to check.

## 2021-08-19 ENCOUNTER — APPOINTMENT (OUTPATIENT)
Dept: INFUSION THERAPY | Age: 46
End: 2021-08-19

## 2021-08-20 ENCOUNTER — TELEPHONE (OUTPATIENT)
Dept: ONCOLOGY | Age: 46
End: 2021-08-20

## 2021-08-20 NOTE — TELEPHONE ENCOUNTER
Patient called and stated that he would like to have his port flushed. Please advise.       CB# [phone]

## 2021-08-23 ENCOUNTER — TELEPHONE (OUTPATIENT)
Dept: ONCOLOGY | Age: 46
End: 2021-08-23

## 2021-08-25 ENCOUNTER — TELEPHONE (OUTPATIENT)
Dept: ONCOLOGY | Age: 46
End: 2021-08-25

## 2021-08-29 NOTE — PROGRESS NOTES
Palliative Medicine Outpatient Services  [place]: [phone] ([phone])    Patient Name: Mary Alice Blackwell YOB: 1975    Date of Current Visit: 09/02/21  Location of Current Visit:    [] [facility] Office  [x] [facility]  [] [address] [place] [address]  [] Home  [] Other:  televisit    Date of Initial Visit: 2/19/19   Referral from: Andrew Leonard MD  Primary Care Physician: Antonio Hooker MD      SUMMARY:   Mary Alice Blackwell is a 39y.o. year old with high-grade follicular lymphoma, who was referred to Palliative Medicine by Dr. Max Tiwari for symptom management and supportive care. He was diagnosed in 11/2018 after presenting with back pain. He is currently receiving systemic chemotherapy. He suffered cardiac arrest during cycle #3 due to previously undiagnosed severe stenosis of the LAD s/p PTCA and stent. He has since completed systemic chemotherapy. His most recent PET-CT (5/2019) showed no focal areas of increased hypermetabolic uptake. The patients social history includes: he is single. He lives in [place] with his current girlfriend and their infant son. Inda Brittle He has 3 teenage children who live with their mother and with whom he has close contact. 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 Palliative Medicine is addressing the following current patient/family concerns: anxiety, depression, left mid- and low back pain, poor appetite, fatigue, advanced care planning. Initial Referral Intake note from Dom Guerra RN reviewed prior to visit   PALLIATIVE DIAGNOSES:       ICD-10-CM ICD-9-CM    1. Chronic left-sided low back pain without sciatica  M54.5 724.2 TOXASSURE SELECT 13 (MW)    G89.29 338.29 TOXASSURE SELECT 13 (MW)      oxyCODONE IR (ROXICODONE) 5 mg immediate release tablet      oxyCODONE IR (ROXICODONE) 5 mg immediate release tablet      oxyCODONE IR (ROXICODONE) 5 mg immediate release tablet   2. Right elbow pain  M25.521 719.42    3. Anxiety  F41.9 300.00    4.  Reactive depression  F32.9 300.4 5. Other fatigue  R53.83 780.79    6. Follicular lymphoma grade IIIa of intra-abdominal lymph nodes (HCC)  C82.33 202.03 TOXASSURE SELECT 13 (MW)      TOXASSURE SELECT 13 (MW)   7. Coronary artery disease involving native coronary artery of native heart, unspecified whether angina present  I25.10 414.01    8. History of cardiac arrest  Z86.74 V12.53           PLAN:   Patient Instructions       Dear Colletta Garin. ,    It was a pleasure seeing you today in [facility]    We will see you again in 4 months for a virtual visit. If labs or imaging tests have been ordered for you today, please call the office at [phone] 48 hours after completion to obtain the results. Your described symptoms were: Fatigue: 2 Drowsiness: 2   Depression: 1 Pain: 5   Anxiety: 8 Nausea: 0   Anorexia: 0 Dyspnea: 0     Constipation: No   Other Problem (Comment): 0       This is the plan we talked about:      1. Right elbow pain  -Use ice packs and tylenol    2. Back pain   -Continue oxycodone 5-mg every 4 hours as needed  -You have a prescription ready for pick-up today and refills for  on 10/1, 10/31, 11/30  -Continue tylenol as needed for moderate pain  -Continue heat or ice as needed    2. Anxiety/depression  -Continue escitalopram to 20-mg daily    3. Fatigue  -This is chronic and unchanged  -You continue to work full-time as a manager at [employer]    4. Poor appetite  -You're eating and you haven't lost any weight    5. Lymphoma  -You see Dr. Noni Aviles for routine follow-up  -You anticipate having repeat scans later this month    6. Primary care  -You're now being followed by Dr. Daniela Donis    7.  Coronary artery disease/history of cardiac arrest  -I recommend you restart clopidogrel (Plavix) and schedule an appointment with Dr. Roque Mayes for referral to cardiology     This is what you have shared with us about Advance Care Planning:      Primary Decision Maker: Chely Iverson - Ex-Spouse - [phone]  [] Named in a scanned document   [x] Legal Next of Kin  [] Guardian    ACP documents you current have include:  [] Advance Directive or Living Will  [] Durable Do Not Resuscitate  [] Physician Orders for Scope of Treatment (POST)  [] Medical Power of   [] Other      The Palliative Medicine Team is here to support you and your family. Sincerely,      Yaniv Morales MD and the Palliative Medicine Team       GOALS OF CARE / TREATMENT PREFERENCES:   [====Goals of Care====]  GOALS OF CARE:  Patient / health care proxy stated goals: See Patient Instructions / Summary    TREATMENT PREFERENCES:   Code Status:  [x] Attempt Resuscitation       [] Do Not Attempt Resuscitation    Advance Care Planning:  [x] The [employer] Interdisciplinary Team has updated the ACP Navigator with Decision Maker and Patient Capacity      Primary Decision Maker: [de-identified] - Ex-Spouse - [phone]  [] Named in a scanned document   [x] Legal Next of Kin  [] Guardian    Other:  (If patient appropriate for POST, consider using PALLPOST smart phrase here)    The palliative care team has discussed with patient / health care proxy about goals of care / treatment preferences for patient.  [====Goals of Care====]     PRESCRIPTIONS GIVEN:     Medications Ordered Today   Medications    oxyCODONE IR (ROXICODONE) 5 mg immediate release tablet     Sig: Take 1 Tablet by mouth every four (4) hours as needed for Pain for up to 30 days. Max Daily Amount: 30 mg. Dispense:  80 Tablet     Refill:  0    oxyCODONE IR (ROXICODONE) 5 mg immediate release tablet     Sig: Take 1 Tablet by mouth every four (4) hours as needed for Pain for up to 30 days. Max Daily Amount: 30 mg. Dispense:  80 Tablet     Refill:  0    oxyCODONE IR (ROXICODONE) 5 mg immediate release tablet     Sig: Take 1 Tablet by mouth every four (4) hours as needed for Pain for up to 30 days. Max Daily Amount: 30 mg.      Dispense:  80 Tablet Refill:  0           FOLLOW UP:     Future Appointments   Date Time Provider Dahlia Epps   9/2/2021 10:45 AM SS INF4 CH4 <1H [facility]   9/2/2021 11:00 AM Robinett, Duane Burows, NP [facility]           PHYSICIANS INVOLVED IN CARE:   Patient Care Team:  Jared Carrillo MD as PCP - General (Family Medicine)  Jay Gonzalez MD (Hematology and Oncology)  Fredrick Salcido MD as Physician (Palliative Medicine)  Fredrick Salcido MD as Physician (Palliative Medicine)       HISTORY:   Reviewed patient-completed ESAS and advance care planning form. Reviewed patient record in prescription monitoring program.    CHIEF COMPLAINT:   Chief Complaint   Patient presents with    Back Pain    Elbow Pain       HPI/SUBJECTIVE:    The patient is: [x] Verbal / [] Nonverbal     His back pain is about the same, worse when he's working due to being on his feet so much. He's had pain in his right elbow for about a month. He's had no swelling, redness. He's been able to use his arm without any problems. The pain is worse at night. He's been taking Aleve. 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 He's always anxious. He continues to take Lexapro. He \"just deals with it\" when he feels more stressed. His appetite's been good    He's moving his bowels regularly. He stopped taking his blood thinner (Plavix). He's had no chest tightness or pain, no radiating arm pain, no shortness of breath.         Clinical Pain Assessment (nonverbal scale for nonverbal patients):   [++++ Clinical Pain Assessment++++]  [++++Pain Severity++++]: Pain: 5  [++++Pain Character++++]: stabbing pain in back  [++++Pain Duration++++]: months for back pain, weeks for groin pain  [++++Pain Effect++++]: little  [++++Pain Factors++++]: oxycodone helps with back pain, groin pain elicited by standing and walking  [++++Pain Frequency++++]: constant back pain with varying intensity  [++++Pain Location++++]: left lower back  [++++ Clinical Pain Assessment++++]       FUNCTIONAL ASSESSMENT:     Palliative Performance Scale (PPS):  PPS: 70       PSYCHOSOCIAL/SPIRITUAL SCREENING:     Any spiritual / Anabaptism concerns:  [] Yes /  [x] No    Caregiver Burnout:  [] Yes /  [x] No /  [] No Caregiver Present      Anticipatory grief assessment:   [x] Normal  / [] Maladaptive       ESAS Anxiety: Anxiety: 8    ESAS Depression: Depression: 1       REVIEW OF SYSTEMS:     The following systems were [x] reviewed / [] unable to be reviewed  Systems: constitutional, ears/nose/mouth/throat, respiratory, gastrointestinal, genitourinary, musculoskeletal, integumentary, neurologic, psychiatric, endocrine. Positive findings noted below. Modified ESAS Completed by: provider   Fatigue: 2 Drowsiness: 2   Depression: 1 Pain: 5   Anxiety: 8 Nausea: 0   Anorexia: 0 Dyspnea: 0   Best Well-Bein Constipation: No   Other Problem (Comment): 0          PHYSICAL EXAM:     Wt Readings from Last 3 Encounters:   21 154 lb (69.9 kg)   21 153 lb 12.8 oz (69.8 kg)   12/10/20 150 lb 3.2 oz (68.1 kg)     Blood pressure (!) 155/97, pulse 85, resp. rate 20, weight 154 lb (69.9 kg), SpO2 98 %. Last bowel movement: See Nursing Note    Constitutional: sitting in chair, appears rested  Eyes: pupils equal, anicteric  ENMT: no nasal discharge, moist mucous membranes  Cardiovascular: regular rhythm, no peripheral edema  Respiratory: breathing not labored, symmetric  Gastrointestinal: soft non-tender, +bowel sounds  Musculoskeletal: no deformity; right elbow with no redness or swelling, point tenderness.  Full ROM right elbow  Skin: warm, dry  Neurologic: following commands, moving all extremities  Psychiatric: full affect, no hallucinations  Other:        HISTORY:     Past Medical History:   Diagnosis Date    Anxiety     Cancer [facility])     lymphoma 2018 receiving chemo    Chronic pain     lower back- lymphoma    Hyperlipidemia     Hypertension     Lymphadenopathy 11/12/2018      Past Surgical History:   Procedure Laterality Date    HX HEART CATHETERIZATION  02/2019    HX OTHER SURGICAL  02/2019    cardiac stent    IR INJECTION PSEUDOANEURYSM  2/26/2019      Family History   Problem Relation Age of Onset    Hypertension Father     Diabetes Father     Diabetes Mother       History reviewed, no pertinent family history. Social History     Tobacco Use    Smoking status: Current Every Day Smoker     Packs/day: 0.50     Years: 20.00     Pack years: 10.00    Smokeless tobacco: Never Used   Substance Use Topics    Alcohol use: No     No Known Allergies   Current Outpatient Medications   Medication Sig    traZODone (DESYREL) 50 mg tablet Take 50 mg by mouth nightly. Take 50 mg nightly    oxyCODONE IR (ROXICODONE) 5 mg immediate release tablet Take 1 Tablet by mouth every four (4) hours as needed for Pain for up to 30 days. Max Daily Amount: 30 mg.  potassium chloride (KLOR-CON) 10 mEq tablet Take 1 Tab by mouth daily.  escitalopram oxalate (LEXAPRO) 20 mg tablet TAKE 1 TABLET BY MOUTH EVERY DAY    aspirin 81 mg chewable tablet Take 81 mg by mouth daily.  metoprolol succinate (TOPROL-XL) 50 mg XL tablet TAKE 1 TABLET BY MOUTH EVERY DAY    lisinopriL (PRINIVIL, ZESTRIL) 40 mg tablet TAKE 1 TABLET BY MOUTH EVERY DAY    atorvastatin (LIPITOR) 40 mg tablet TAKE 1 TAB BY MOUTH NIGHTLY. INDICATIONS: TREATMENT TO SLOW PROGRESSION OF CORONARY ARTERY DISEASE    clopidogrel (PLAVIX) 75 mg tab 1 Tab by Per NG tube route daily.  L. acidoph & paracasei- S therm- Bifido (AUSTIN-Q/RISAQUAD) 8 billion cell cap cap take 1 cap daily (Patient not taking: Reported on 9/2/2021)    LORazepam (Ativan) 0.5 mg tablet Take 1 Tab by mouth. (Patient not taking: Reported on 6/10/2021)    predniSONE (DELTASONE) 20 mg tablet take 5 tablet by oral route  every day.  Take on Days 1 through 5 each cycle (Patient not taking: Reported on 6/10/2021)    prochlorperazine (Compazine) 10 mg tablet Take 1 Tab by mouth. (Patient not taking: Reported on 6/10/2021)    senna-docusate (PERICOLACE) 8.6-50 mg per tablet Take 1 Tab by mouth. (Patient not taking: Reported on 6/10/2021)    ondansetron hcl (ZOFRAN) 4 mg tablet Take 2 Tabs by mouth every eight (8) hours as needed for Nausea or Vomiting. (Patient not taking: Reported on 6/10/2021)    naloxone [facility]) 4 mg/actuation nasal spray Use 1 spray intranasally, then discard. Repeat with new spray every 2 min as needed for opioid overdose symptoms, alternating nostrils. (Patient not taking: Reported on 9/2/2021)     No current facility-administered medications for this visit. LAB DATA REVIEWED:     Lab Results   Component Value Date/Time    WBC 7.6 12/10/2020 11:19 AM    HGB 14.2 12/10/2020 11:19 AM    PLATELET 362 23/99/2992 11:19 AM     Lab Results   Component Value Date/Time    Sodium 139 12/10/2020 11:19 AM    Potassium 3.1 (L) 12/10/2020 11:19 AM    Chloride 110 (H) 12/10/2020 11:19 AM    CO2 27 12/10/2020 11:19 AM    BUN 11 12/10/2020 11:19 AM    Creatinine 1.06 12/10/2020 11:19 AM    Calcium 8.6 12/10/2020 11:19 AM    Magnesium 1.7 01/12/2019 04:05 AM    Phosphorus 2.0 (L) 01/12/2019 04:05 AM      Lab Results   Component Value Date/Time    Alk. phosphatase 106 12/10/2020 11:19 AM    Protein, total 6.6 12/10/2020 11:19 AM    Albumin 3.5 12/10/2020 11:19 AM    Globulin 3.1 12/10/2020 11:19 AM     Lab Results   Component Value Date/Time    INR 1.1 02/22/2019 08:18 PM    Prothrombin time 10.8 02/22/2019 08:18 PM    aPTT 27.8 02/22/2019 08:18 PM      No results found for: IRON, FE, TIBC, IBCT, PSAT, FERR       MRI lumbar spine 12/18/19:  Congenital narrowing of the lumbar canal. Vertebral body heights are maintained. Marrow signal is normal.     The conus medullaris terminates at T12/L1. Signal and caliber of the distal  spinal cord are within normal limits.  There is no pathologic intrathecal  enhancement.     The paraspinal soft tissues are within normal limits.     Lower thoracic spine: No herniation or stenosis.     L1-L2: No herniation or stenosis.     L2-L3: No herniation or stenosis.     L3-L4: Mild facet arthropathy. Minimal central disc protrusion. Mild canal  stenosis. Foramina are patent     L4-L5: Desiccation. Mild facet arthropathy. Canal demonstrates minimal stenosis. There is an annular ligament tear far laterally on the left. Mild left foraminal  stenosis.     L5-S1: Mild facet arthropathy. Canal and foramina are patent. IMPRESSION:  Mild disc degenerative change at L3-4 and L4-5.     Mild canal stenosis at L3-4 and mild left foraminal stenosis at L4-5.     Other less severe degenerative findings are as described above. CT chest/abdomen 12/16/19:  FINDINGS:      THYROID: No nodule. MEDIASTINUM: No mass or lymphadenopathy. Port catheter in place on the right. Catheter tip in appropriate position. SB: No mass or lymphadenopathy. THORACIC AORTA: No dissection or aneurysm. MAIN PULMONARY ARTERY: Unremarkable  TRACHEA/BRONCHI: Unremarkable  ESOPHAGUS: No wall thickening or dilatation. HEART: Normal in size. PLEURA: No effusion or pneumothorax. LUNGS: Bibasilar atelectasis is noted. Excell Hardik LIVER: No mass or biliary dilatation. GALLBLADDER: Layering contrast versus cholelithiasis. SPLEEN: No mass. PANCREAS: No mass or ductal dilatation. ADRENALS: The left adrenal gland is elevated by the retroperitoneal mass. The    right is unremarkable. KIDNEYS: There is diminished soft tissue density at the level of the left renal  hilum. There is increased left renal cortical atrophy. STOMACH: Unremarkable. SMALL BOWEL: No dilatation or wall thickening. COLON: No dilatation or wall thickening. APPENDIX: Unremarkable. PERITONEUM: No ascites or pneumoperitoneum. RETROPERITONEUM:   Diminished size of retroperitoneal mass. Diminished in size with margins difficult to fully ascertain.    2-62 35 x 50 mm previously 71 x 94 mm.     2-67 left periaortic adenopathy 25 x 17 mm  The SMA, celiac, and LIZZY are remain encased, diminished attenuation of these  vessels.      REPRODUCTIVE ORGANS: The prostate and seminal vesicles are unremarkable. URINARY  BLADDER: Unremarkable  BONES: No destructive bone lesion. ADDITIONAL COMMENTS: Resolved left inguinal pseudoaneurysm. .       IMPRESSION:    Baseline research examination. Findings are consistent with interval response to therapy.      Continued diminished size of retroperitoneal mass-adenopathy,  with diminished soft tissue density at the left renal hilum.          CONTROLLED SUBSTANCES SAFETY ASSESSMENT (IF ON CONTROLLED SUBSTANCES):     Reviewed opioid safety handout:  [x] Yes   [] No  24 hour opioid dose >150mg morphine equivalent/day:  [] Yes   [x] No  Benzodiazepines:  [x] Yes   [] No  Sleep apnea:  [] Yes   [x] No  Urine Toxicology Testing within last 6 months:  [] Yes   [x] No  History of or new aberrant medication taking behaviors:  [] Yes   [x] No  Has Narcan been prescribed [x] Yes   [] No          Total time:   Counseling / coordination time:   > 50% counseling / coordination?:

## 2021-09-02 ENCOUNTER — OFFICE VISIT (OUTPATIENT)
Dept: PALLATIVE CARE | Age: 46
End: 2021-09-02
Payer: COMMERCIAL

## 2021-09-02 ENCOUNTER — HOSPITAL ENCOUNTER (OUTPATIENT)
Dept: INFUSION THERAPY | Age: 46
Discharge: HOME | End: 2021-09-02
Payer: COMMERCIAL

## 2021-09-02 VITALS
DIASTOLIC BLOOD PRESSURE: 97 MMHG | WEIGHT: 154 LBS | SYSTOLIC BLOOD PRESSURE: 155 MMHG | HEART RATE: 85 BPM | RESPIRATION RATE: 20 BRPM | BODY MASS INDEX: 24.12 KG/M2 | OXYGEN SATURATION: 98 %

## 2021-09-02 VITALS
HEART RATE: 77 BPM | DIASTOLIC BLOOD PRESSURE: 83 MMHG | SYSTOLIC BLOOD PRESSURE: 162 MMHG | RESPIRATION RATE: 16 BRPM | TEMPERATURE: 97.9 F

## 2021-09-02 DIAGNOSIS — I25.10 CORONARY ARTERY DISEASE INVOLVING NATIVE CORONARY ARTERY OF NATIVE HEART, UNSPECIFIED WHETHER ANGINA PRESENT: ICD-10-CM

## 2021-09-02 DIAGNOSIS — Z86.74 HISTORY OF CARDIAC ARREST: ICD-10-CM

## 2021-09-02 DIAGNOSIS — G89.29 CHRONIC LEFT-SIDED LOW BACK PAIN WITHOUT SCIATICA: Primary | ICD-10-CM

## 2021-09-02 DIAGNOSIS — F41.9 ANXIETY: ICD-10-CM

## 2021-09-02 DIAGNOSIS — C82.90 FOLLICULAR LYMPHOMA, UNSPECIFIED FOLLICULAR LYMPHOMA TYPE, UNSPECIFIED BODY REGION (HCC): Primary | ICD-10-CM

## 2021-09-02 DIAGNOSIS — F32.9 REACTIVE DEPRESSION: ICD-10-CM

## 2021-09-02 DIAGNOSIS — M25.521 RIGHT ELBOW PAIN: ICD-10-CM

## 2021-09-02 DIAGNOSIS — M54.50 CHRONIC LEFT-SIDED LOW BACK PAIN WITHOUT SCIATICA: Primary | ICD-10-CM

## 2021-09-02 DIAGNOSIS — R53.83 OTHER FATIGUE: ICD-10-CM

## 2021-09-02 DIAGNOSIS — C82.33 FOLLICULAR LYMPHOMA GRADE IIIA OF INTRA-ABDOMINAL LYMPH NODES (HCC): ICD-10-CM

## 2021-09-02 PROBLEM — I24.9 ACS (ACUTE CORONARY SYNDROME) (HCC): Status: RESOLVED | Noted: 2019-02-14 | Resolved: 2021-09-02

## 2021-09-02 LAB
ANION GAP SERPL CALC-SCNC: 4 MMOL/L (ref 5–15)
BASOPHILS # BLD: 0.1 K/UL (ref 0–0.1)
BASOPHILS NFR BLD: 1 % (ref 0–1)
BUN SERPL-MCNC: 16 MG/DL (ref 6–20)
BUN/CREAT SERPL: 14 (ref 12–20)
CALCIUM SERPL-MCNC: 8.4 MG/DL (ref 8.5–10.1)
CHLORIDE SERPL-SCNC: 111 MMOL/L (ref 97–108)
CO2 SERPL-SCNC: 24 MMOL/L (ref 21–32)
CREAT SERPL-MCNC: 1.11 MG/DL (ref 0.7–1.3)
DIFFERENTIAL METHOD BLD: ABNORMAL
EOSINOPHIL # BLD: 0.3 K/UL (ref 0–0.4)
EOSINOPHIL NFR BLD: 4 % (ref 0–7)
ERYTHROCYTE [DISTWIDTH] IN BLOOD BY AUTOMATED COUNT: 12.8 % (ref 11.5–14.5)
GLUCOSE SERPL-MCNC: 110 MG/DL (ref 65–100)
HCT VFR BLD AUTO: 41.8 % (ref 36.6–50.3)
HGB BLD-MCNC: 14 G/DL (ref 12.1–17)
IMM GRANULOCYTES # BLD AUTO: 0 K/UL (ref 0–0.04)
IMM GRANULOCYTES NFR BLD AUTO: 1 % (ref 0–0.5)
LYMPHOCYTES # BLD: 2.2 K/UL (ref 0.8–3.5)
LYMPHOCYTES NFR BLD: 27 % (ref 12–49)
MCH RBC QN AUTO: 33.5 PG (ref 26–34)
MCHC RBC AUTO-ENTMCNC: 33.5 G/DL (ref 30–36.5)
MCV RBC AUTO: 100 FL (ref 80–99)
MONOCYTES # BLD: 0.6 K/UL (ref 0–1)
MONOCYTES NFR BLD: 7 % (ref 5–13)
NEUTS SEG # BLD: 5 K/UL (ref 1.8–8)
NEUTS SEG NFR BLD: 60 % (ref 32–75)
NRBC # BLD: 0 K/UL (ref 0–0.01)
NRBC BLD-RTO: 0 PER 100 WBC
PLATELET # BLD AUTO: 208 K/UL (ref 150–400)
PMV BLD AUTO: 10.9 FL (ref 8.9–12.9)
POTASSIUM SERPL-SCNC: 3.9 MMOL/L (ref 3.5–5.1)
RBC # BLD AUTO: 4.18 M/UL (ref 4.1–5.7)
SODIUM SERPL-SCNC: 139 MMOL/L (ref 136–145)
WBC # BLD AUTO: 8.2 K/UL (ref 4.1–11.1)

## 2021-09-02 PROCEDURE — 74011250636 HC RX REV CODE- 250/636: Performed by: NURSE PRACTITIONER

## 2021-09-02 PROCEDURE — 99214 OFFICE O/P EST MOD 30 MIN: CPT | Performed by: INTERNAL MEDICINE

## 2021-09-02 PROCEDURE — 80048 BASIC METABOLIC PNL TOTAL CA: CPT

## 2021-09-02 PROCEDURE — 36591 DRAW BLOOD OFF VENOUS DEVICE: CPT

## 2021-09-02 PROCEDURE — 77030016057 HC NDL HUBR APOL -B

## 2021-09-02 PROCEDURE — 85025 COMPLETE CBC W/AUTO DIFF WBC: CPT

## 2021-09-02 RX ORDER — SODIUM CHLORIDE 9 MG/ML
10 INJECTION INTRAMUSCULAR; INTRAVENOUS; SUBCUTANEOUS AS NEEDED
Status: ACTIVE | OUTPATIENT
Start: 2021-09-02 | End: 2021-09-02

## 2021-09-02 RX ORDER — OXYCODONE HYDROCHLORIDE 5 MG/1
5 TABLET ORAL
Qty: 80 TABLET | Refills: 0 | Status: SHIPPED | OUTPATIENT
Start: 2021-11-30 | End: 2021-12-30

## 2021-09-02 RX ORDER — SODIUM CHLORIDE 0.9 % (FLUSH) 0.9 %
5-10 SYRINGE (ML) INJECTION AS NEEDED
Status: DISPENSED | OUTPATIENT
Start: 2021-09-02 | End: 2021-09-02

## 2021-09-02 RX ORDER — OXYCODONE HYDROCHLORIDE 5 MG/1
5 TABLET ORAL
Qty: 80 TABLET | Refills: 0 | Status: SHIPPED | OUTPATIENT
Start: 2021-10-01 | End: 2021-10-31

## 2021-09-02 RX ORDER — OXYCODONE HYDROCHLORIDE 5 MG/1
5 TABLET ORAL
Qty: 80 TABLET | Refills: 0 | Status: SHIPPED | OUTPATIENT
Start: 2021-10-31 | End: 2021-11-08 | Stop reason: SDUPTHER

## 2021-09-02 RX ORDER — OXYCODONE HYDROCHLORIDE 5 MG/1
5 TABLET ORAL
Qty: 80 TABLET | Refills: 0 | Status: CANCELLED | OUTPATIENT
Start: 2021-09-02 | End: 2021-10-02

## 2021-09-02 RX ORDER — HEPARIN 100 UNIT/ML
500 SYRINGE INTRAVENOUS AS NEEDED
Status: ACTIVE | OUTPATIENT
Start: 2021-09-02 | End: 2021-09-02

## 2021-09-02 RX ADMIN — Medication 10 ML: at 10:41

## 2021-09-02 RX ADMIN — HEPARIN 500 UNITS: 100 SYRINGE at 10:42

## 2021-09-02 RX ADMIN — SODIUM CHLORIDE 10 ML: 9 INJECTION INTRAMUSCULAR; INTRAVENOUS; SUBCUTANEOUS at 10:42

## 2021-09-02 NOTE — PROGRESS NOTES
Let patient know overall labs are stable. I would like him to call to schedule a follow up visit as he missed his last visit.  Thank you

## 2021-09-02 NOTE — PATIENT INSTRUCTIONS
Dear Arline Fournier. ,    It was a pleasure seeing you today in [facility]    We will see you again in 4 months for a virtual visit. If labs or imaging tests have been ordered for you today, please call the office at [phone] 48 hours after completion to obtain the results. Your described symptoms were: Fatigue: 2 Drowsiness: 2   Depression: 1 Pain: 5   Anxiety: 8 Nausea: 0   Anorexia: 0 Dyspnea: 0     Constipation: No   Other Problem (Comment): 0       This is the plan we talked about:      1. Right elbow pain  -Use ice packs and tylenol    2. Back pain   -Continue oxycodone 5-mg every 4 hours as needed  -You have a prescription ready for pick-up today and refills for  on 10/1, 10/31, 11/30  -Continue tylenol as needed for moderate pain  -Continue heat or ice as needed    2. Anxiety/depression  -Continue escitalopram to 20-mg daily    3. 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 Poor appetite  -You're eating and you haven't lost any weight    5. Lymphoma  -You see Dr. Amanda Stout for routine follow-up  -You anticipate having repeat scans later this month    6. Primary care  -You're now being followed by Dr. Nadine Singh    7. Coronary artery disease/history of cardiac arrest  -I recommend you restart clopidogrel (Plavix) and schedule an appointment with Dr. Keyur Foss for referral to cardiology     This is what you have shared with us about Advance Care Planning:      Primary Decision Maker: [de-identified] - Ex-Spouse - [phone]  [] Named in a scanned document   [x] Legal Next of Kin  [] Guardian    ACP documents you current have include:  [] Advance Directive or Living Will  [] Durable Do Not Resuscitate  [] Physician Orders for Scope of Treatment (POST)  [] Medical Power of   [] Other      The Palliative Medicine Team is here to support you and your family.          Sincerely,      Selvin Zuleika Avila MD and the Palliative Medicine Team

## 2021-09-02 NOTE — PROGRESS NOTES
[facility] Progress Note    Date: 2021    Name: Rae Max. MRN: [id number]         : 1975    Mr. Florida Ewing Arrived ambulatory and in no distress for Port flush/labs. Right chest wall port accessed without difficulty, labs drawn & sent for processing. Mr. Moraima Lopez vitals were reviewed. Patient Vitals for the past 12 hrs:   Temp Pulse Resp BP   21 1040 97.9 °F (36.6 °C) 77 16 (!) 162/83     Labs pending in CC. Medications:  Medications Administered     0.9% sodium chloride injection 10 mL     Admin Date  2021 Action  Given Dose  10 mL Route  IntraVENous Administered By  Marcela Hernandez RN          heparin (porcine) pf 500 Units     Admin Date  2021 Action  Given Dose  500 Units Route  IntraVENous Administered By  Marcela Hernandez RN          sodium chloride (NS) flush 5-10 mL     Admin Date  2021 Action  Given Dose  10 mL Route  IntraVENous Administered By  Marcela Hernandez RN              Mr. [place] Ewing was discharged from [place] [address] in stable condition at 1045. Port de-accessed, flushed & heparinized per protocol. Notified [facility] [place] Desk to schedule next appointment on  for PF/labs per beacon.      Pam Harper RN  2021

## 2021-09-02 NOTE — PROGRESS NOTES
Palliative Medicine Office Visit  Palliative Medicine Nurse Check In  [phone]    Patient Name: Corry Najera. YOB: 1975      Date of Office Visit: 9/2/2021    Patient states: \"  \"    From Check In Sheet (scanned in Media):  Has a medical provider talked with you about cardiopulmonary resuscitation (CPR)? [x] Yes   [] No   [] Unable to obtain    Nurse reminder to complete or update ACP FlowSheet:    Is ACP on the Problem List?    [] Yes    [x] No  IF ACP Document is ON FILE; Nurse to place ACP on Problem List     Is there an ACP Note in Chart Review/Note? [x] Yes    [] No   If NO: ALERT PROVIDER       Primary Decision Maker: [de-identified] - Ex-Spouse - [phone]  Advance Care Planning 9/2/2021   Patient's [address] is: Legal Next of Kin   Confirm Advance Directive None   Patient Would Like to Complete Advance Directive -   Does the patient have other document types -       Is there anything that we should know about you as a person in order to provide you the best care possible? Have you been to the ER, urgent care clinic since your last visit? [] Yes   [x] No   [] Unable to obtain    Have you been hospitalized since your last visit? [] Yes   [x] No   [] Unable to obtain    Have you seen or consulted any other health care providers outside of the [address] [place] [address] since your last visit?    [] Yes   [x] No   [] Unable to obtain    Functional status (describe):         Last BM: 9/2/2021     accessed (date): 9/2/2021    Bottle review (for opioid pain medication):  Medication 1:   Date filled:   Directions:   # filled:   # left:   # pills taking per day:  Last dose taken:    Medication 2:   Date filled:   Directions:   # filled:   # left:   # pills taking per day:  Last dose taken:    Medication 3:   Date filled:   Directions:   # filled:   # left:   # pills taking per day:  Last dose taken:    Medication 4:   Date filled:   Directions:   # filled:   # left:   # pills taking per day:  Last dose taken:

## 2021-09-03 ENCOUNTER — TELEPHONE (OUTPATIENT)
Dept: ONCOLOGY | Age: 46
End: 2021-09-03

## 2021-09-07 ENCOUNTER — TELEPHONE (OUTPATIENT)
Dept: PALLATIVE CARE | Age: 46
End: 2021-09-07

## 2021-09-07 DIAGNOSIS — C82.33 FOLLICULAR LYMPHOMA GRADE IIIA OF INTRA-ABDOMINAL LYMPH NODES (HCC): ICD-10-CM

## 2021-09-07 DIAGNOSIS — G89.29 CHRONIC LEFT-SIDED LOW BACK PAIN WITHOUT SCIATICA: Primary | ICD-10-CM

## 2021-09-07 DIAGNOSIS — M54.50 CHRONIC LEFT-SIDED LOW BACK PAIN WITHOUT SCIATICA: Primary | ICD-10-CM

## 2021-09-07 RX ORDER — OXYCODONE HYDROCHLORIDE 5 MG/1
5 TABLET ORAL
Qty: 80 TABLET | Refills: 0 | Status: SHIPPED | OUTPATIENT
Start: 2021-09-07 | End: 2021-10-07

## 2021-09-07 NOTE — TELEPHONE ENCOUNTER
Palliative Medicine  Nursing Note  [phone]  Fax [phone]      Telephone Call  Patient Name: Ruthann Muñoz. YOB: [birth year]        Primary Decision Maker: Hunter Frey - Ex-Spouse - [phone]   Advance Care Planning 9/2/2021   Patient's Healthcare Decision Maker is: Legal Next of Kin   Confirm Advance Directive None   Patient Would Like to Complete Advance Directive -   Does the patient have other document types -     Call placed to Pharmacy and asked that they put a hold on the Oxycodone today until Palliative can call back with an okay to fill. Pharmacist verbalized understanding and is able to comply. Call placed to Mr. Elias Malik and informed that Dr. Hong Stern will need him to provide a urine specimen at the [facility] lab today prior to the  of his Oxycodone prescription. He verbalized understanding and thought he would be able to get that done today. Will call pharmacy once specimen has been obtained.     Margarito Blanco, RN  Palliative Medicine

## 2021-09-07 NOTE — TELEPHONE ENCOUNTER
Patient calling to state that pharmacy advised him he cannot get script for pain medication until October.  He is requesting a call back from nurse to [facility]

## 2021-09-07 NOTE — TELEPHONE ENCOUNTER
Per patients  Oxycodone IR 5 mg last filled on 8/5/2021 #80 for 30 days. Patient available for refill 9/5/2021. Future postdated scripts looks to have omitted September.     Please send script for September

## 2021-09-09 ENCOUNTER — TELEPHONE (OUTPATIENT)
Dept: PALLATIVE CARE | Age: 46
End: 2021-09-09

## 2021-09-09 NOTE — TELEPHONE ENCOUNTER
Palliative Medicine  Nursing Note  [phone]  Fax [phone]      Telephone Call  Patient Name: Glenn Clark. YOB: [birth year]        Primary Decision Maker: Reina Mendez - Ex-Spouse - [phone]   Advance Care Planning 9/2/2021   Patient's Healthcare Decision Maker is: Legal Next of Kin   Confirm Advance Directive None   Patient Would Like to Complete Advance Directive -   Does the patient have other document types -     Incoming call from Mr. Jeovany Paz. He stated that he has not been able to  his Oxycodone from pharmacy. He shares that he provided a urine specimen on Tuesday which was needed prior to picking up Oxycodone. Discussed that Palliative will consult with lab and will call him with an update. Lab stated that Mr. Jeovany Paz did provide specimen 9/7/21 and it was sent out to [facility] for processing. Call placed to pharmacy multiple times, unable to get through to staff, and/or disconnected. Left voice message on their prescriber line that Oxycodone prescription can be picked up by patient today. Call placed to Mr. Jeovany Paz and left voice message  informing of the above.       Renuka Lanza RN  Palliative Medicine

## 2021-09-14 ENCOUNTER — TELEPHONE (OUTPATIENT)
Dept: ONCOLOGY | Age: 46
End: 2021-09-14

## 2021-09-14 NOTE — TELEPHONE ENCOUNTER
[address] at [facility]  [phone]        09/14/21 11:35 AM Attempted to call patient via home/cell number listed. Patient currently scheduled for MD visit on 09/20. Patient due for repeat imaging--CT chest/abdomen/pelvis. Would like to provide patient with  phone number to proceed with scheduling. No answer, left message of above and provided office phone number for call back. 09/15/21 10:08 AM Attempted to call patient again via home/cell number. No answer, left message requesting return call. Provided office phone number as well.     09/16/21 10:13 AM Attempted to call patient via home/cell number listed to remind him of upcoming appointment and need for imaging prior to office visit if possible. No answer, left message requesting return call. Provided office phone number as well.

## 2021-09-20 ENCOUNTER — OFFICE VISIT (OUTPATIENT)
Dept: ONCOLOGY | Age: 46
End: 2021-09-20
Payer: COMMERCIAL

## 2021-09-20 ENCOUNTER — APPOINTMENT (OUTPATIENT)
Dept: INFUSION THERAPY | Age: 46
End: 2021-09-20

## 2021-09-20 VITALS
WEIGHT: 158 LBS | BODY MASS INDEX: 24.8 KG/M2 | OXYGEN SATURATION: 98 % | SYSTOLIC BLOOD PRESSURE: 143 MMHG | HEART RATE: 83 BPM | TEMPERATURE: 96.2 F | DIASTOLIC BLOOD PRESSURE: 97 MMHG | HEIGHT: 67 IN

## 2021-09-20 DIAGNOSIS — I25.10 CORONARY ARTERY DISEASE INVOLVING NATIVE CORONARY ARTERY OF NATIVE HEART WITHOUT ANGINA PECTORIS: ICD-10-CM

## 2021-09-20 DIAGNOSIS — M54.50 CHRONIC BILATERAL LOW BACK PAIN WITHOUT SCIATICA: ICD-10-CM

## 2021-09-20 DIAGNOSIS — Z85.72 HISTORY OF FOLLICULAR LYMPHOMA: Primary | ICD-10-CM

## 2021-09-20 DIAGNOSIS — Z71.6 TOBACCO ABUSE COUNSELING: ICD-10-CM

## 2021-09-20 DIAGNOSIS — G89.29 CHRONIC BILATERAL LOW BACK PAIN WITHOUT SCIATICA: ICD-10-CM

## 2021-09-20 PROCEDURE — 99214 OFFICE O/P EST MOD 30 MIN: CPT | Performed by: INTERNAL MEDICINE

## 2021-09-20 NOTE — PROGRESS NOTES
Deanna Rubio is a 42 y.o. male here for follow up of follicular lymphoma. Patient with complaints of back pain, rates as a 5 out of 10.

## 2021-09-20 NOTE — PROGRESS NOTES
[id number] [facility] at [address] [place] [address]  [phone]    Hematology / Oncology Established Visit    Reason for Visit:   Apryl Reyes is a 42 y.o. male who is here for follow up of lymphoma. Hematology Oncology Treatment History:     Diagnosis: Follicular lymphoma    Stage: IV    Pathology:   11/13/18 right inguinal LN excision: Follicular lymphoma, high-grade (grade 3a of 3). Comment   The delaney architecture is entirely effaced by atypical lymphocytic proliferation with prominent nodular growth pattern. The majority of the atypical lymphocytes are small to medium in size and have irregular nuclear outlines and inconspicuous nucleoli, morphologically consistent with centrocytes. Scattered large lymphocytes with prominent nucleoli, consistent with centroblasts are identified (> 15 per high-power field). Focal increase in mitotic figures is noted. Occasional follicular dendritic cells are seen. By immunohistochemistry, the atypical lymphocytes are positive for CD20, PAX5, CD10, BCL6 and BCL2 (weak, focal) and negative for MUM1. CD3, CD5 and CD43 stain numerous background T lymphocytes. Ki-67 reveals a high proliferation index in the neoplastic follicles (overall 59-98%). Clinical history indicates 14 cm retroperitoneal mass with bilateral inguinal lymphadenopathy. In summary, the combined morphologic and phenotypic findings are diagnostic of a high-grade follicular lymphoma (grade 3a of 3). There is no evidence of diffuse large B-cell lymphoma. Flow cytometry analysis:   Monoclonal B-cell population (47 % of all cells) with mild increase in side and forward scatter properties expressing CD19, CD20, CD23 and CD10 with surface lambda light chain restriction. No phenotypically aberrant T-cell population. Flow cytometry was performed at [place]     Prior Treatment:   1. Obinutuzumab-CHOP.  Obinutuzumab: 1000 mg weekly on days 1, 8, 15 for cycle 1, then 1000 mg on day 1 q21 days for cycles 2-6, then monotherapy 1000 mg every 21 days for cycle 7, 8 with Cyclophosphamide 750mg/m2, Doxorubicin 50mg/m2, Vincristine 1.4mg/m2 on day 1 and Prednisone 100mg on Days 1-5, every 21 days for a total of 2 cycles completed late 1/2019. Regimen discontinued due to STEMI. 2. Obinutuzumab + Bendamustine: 1000 mg Obinutuzumab on day 1 + Bendamustine 90mg/m2 on days 1-2 on a 28-day cycle x 4 cycles, completed 5/2019    Current Treatment: Surveillance      Oncologic History:  He states he was in his usual state of health in early November 2018 when he developed low back pain and presented to the ER. The pain was described as constant, radiating to the buttocks, without exacerbating or alleviating factors, associated w/ increased urination, no n/v/d, no dysuria, no fever, no significant weight loss at first, but he did later report 15-lb loss over 1 month due to low appetite. Work-up at outside hospital revealed a retroperitoneal mass seen on CT imaging, and he was transferred to [facility] for further work-up. CT there showed a large retroperitoneal mass encircling the aorta with invasion of the left renal hilum and left adrenal gland. There were bilateral inguinal lymph nodes and moderate left hydronephrosis. He was evaluated while at [facility] and was noted to have palpable nodes in his groin for approximately the past 1 month. No testicular mass, anorexia, weight loss, fevers, night sweats, chest pain, shortness of breath, abdominal pain or nausea. He underwent excisional LN biopsy of right inguinal LN. He was told that he had likely lymphoma. He was advised to follow up as outpatient for PET scan, bone marrow biopsy. However, patient states he was lost to f/u. He never received any calls or appointment details. His aunt urged patient to seek care elsewhere and his PCP recommended [facility].  At our first meeting on 12/13/18, he tells me that he was working full time, feeling well until early Nov 2018. When he developed the left lower back pain, he went to [facility] and [address] [place] [address]. At time of diagnosis, he had no cardiac disease aside from HTN, hyperlipidemia. However, he did have an NSTEMI after cycle 2 of O-CHOP. Was likely unrelated to chemotherapy, but opted to switch treatment to Obinutuzumab-Bendamustine. He completed treatment in 5/2019 and had a CR based on PET. History of Present Illness:   Mr. Noemy Moreno is a 42 y.o. male with HTN is seen for follow up of Follicular lymphoma (now on surveillance). Denies abdominal pain. On chronic pain medication for lower back pain and is managed by Dr. Ana Josue. Weight is stable with a good appetite. No LAD. No recurrent fevers or chills. No diarrhea or constipation. Received both COVID vaccines. Still smoking. PMHx: Lymphoma in 2018, CAD, HTN, Hyperlipidemia, Anxiety, chronic low back pain  PSurgHx: None aside from cardiac stent placement and port placement  SHx: Smokes 1/2ppd x past 20 yrs. Works part time as a cook. Has 2 children. FHx: Father had leukemia, passed away from pancreatic cancer. Review of Systems: A complete review of systems was obtained, negative except as described above. Physical Exam:     Visit Vitals  BP (!) 143/97 (BP 1 Location: Left arm, BP Patient Position: Sitting)   Pulse 83   Temp (!) 96.2 °F (35.7 °C) (Oral)   Ht 5' 7\" (1.702 m)   Wt 158 lb (71.7 kg)   SpO2 98%   BMI 24.75 kg/m²     ECOG PS: 0  General: Well developed, no acute distress, face mask in place. Eyes: Anicteric sclerae  HENT: Atraumatic, OP clear  Neck: Supple  Lymphatic: No cervical, supraclavicular, axillary  adenopathy  Respiratory: CTAB, normal respiratory effort  CV: Normal rate, regular rhythm, no murmurs, no peripheral edema  GI: Soft, nontender, nondistended, no masses  MS: Normal gait and station. Digits without clubbing or cyanosis. Skin: No rashes, ecchymoses, or petechiae. Normal temperature, turgor, and texture.   Neuro/Psych: Alert, oriented. Moves all 4 extremities. Appropriate affect, normal judgment/insight. Results:     Lab Results   Component Value Date/Time    WBC 8.2 2021 10:43 AM    HGB 14.0 2021 10:43 AM    HCT 41.8 2021 10:43 AM    PLATELET 721  10:43 AM    .0 (H) 2021 10:43 AM    ABS. NEUTROPHILS 5.0 2021 10:43 AM    Hemoglobin (POC) 15.0 2009 02:13 PM    Hematocrit (POC) 39 2019 01:24 PM     Lab Results   Component Value Date/Time    Sodium 139 2021 10:43 AM    Potassium 3.9 2021 10:43 AM    Chloride 111 (H) 2021 10:43 AM    CO2 24 2021 10:43 AM    Glucose 110 (H) 2021 10:43 AM    BUN 16 2021 10:43 AM    Creatinine 1.11 2021 10:43 AM    GFR est AA >60 2021 10:43 AM    GFR est non-AA >60 2021 10:43 AM    Calcium 8.4 (L) 2021 10:43 AM    Sodium (POC) 136 2019 01:24 PM    Potassium (POC) 3.9 2019 01:24 PM    Chloride (POC) 102 2019 01:24 PM    Glucose (POC) 249 (H) 02/15/2019 10:21 PM    BUN (POC) 14 2019 01:24 PM    Creatinine (POC) 0.9 2019 01:24 PM    Calcium, ionized (POC) 1.24 2019 01:24 PM     Lab Results   Component Value Date/Time    Bilirubin, total 0.4 12/10/2020 11:19 AM    ALT (SGPT) 20 12/10/2020 11:19 AM    Alk. phosphatase 106 12/10/2020 11:19 AM    Protein, total 6.6 12/10/2020 11:19 AM    Albumin 3.5 12/10/2020 11:19 AM    Globulin 3.1 12/10/2020 11:19 AM     No results found for: IRON, FE, TIBC, IBCT, PSAT, FERR    No results found for: B12LT, FOL, RBCF  Lab Results   Component Value Date/Time    TSH 1.53 2016 04:40 AM     18:     Lab Results   Component Value Date/Time    Hepatitis A, IgM NONREACTIVE 2018 04:53 PM    Hepatitis B surface Ag <0.10 2018 04:53 PM    Hepatitis B core, IgM NONREACTIVE 2018 04:53 PM         Imagin/9/18 Abd/pelvis CT: IMPRESSION:  1.  Interval development of a large retroperitoneal mass encircling the aorta with invasion of the left renal hilum and left adrenal gland. Several adjacent lymph nodes are seen extending into the peritoneum and underneath the  diaphragmatic natalie. This most likely represents lymphoma. 2. Several new bilateral enlarged inguinal lymph nodes also likely representing lymphoma. 3. Moderate left hydronephrosis with a delayed renal nephrogram related to decreased renal function. This is related to the invasion of the renal hilum. 11/11/18 Chest CT: IMPRESSION:  Trace left pleural effusion. Bilateral lower lobe atelectasis. Large  retroperitoneal mass lesion again demonstrated. PET 12/18/18:  FINDINGS:  HEAD/NECK: Right palatine tonsil intense hypermetabolism, max SUV 18. Multilevel  bilateral cervical adenopathy, with max SUV 12 in a left supraclavicular node. Cerebral evaluation is limited by normal intense activity. CHEST: Solitary hypermetabolic left axillary node, max SUV 11. ABDOMEN/PELVIS: Bulky retroperitoneal mass max SUV 27, with several additional  small active abdomino-pelvic nodes. Bilateral inguinal nodes with max SUV 12 on  the left. SKELETON: No foci of abnormal hypermetabolism in the axial and visualized  appendicular skeleton. IMPRESSION:   1. Right palatine tonsil tumor involvement (Deauville 5). 2. Bilateral cervical delaney involvement (Deauville 5). 3. Left axillary node involvement (Deauville 5). 4. Bulky retroperitoneal lymphoma mass and additional smaller hypermetabolic  abdomino-pelvic nodes (Deauville 5). 5. Bilateral inguinal delaney involvement (Deauville 5). Deauville Five Point Scale  1. No uptake or no residual uptake (when used interim)  2. Slight uptake, but below blood pool (mediastinum)  3. Uptake above mediastinal, but below/equal to uptake in the liver  4. Uptake slightly to moderately higher than liver  5.  Markedly increased uptake or any new lesion (on response evaluation)  Each FDG-avid (or previously FDG avid) lesion is rated independently. Reference values:  Mediastinal blood pool: 2.1 SUV  Liver (background): 2.2 SUV    PET/CT 2/05/19:   IMPRESSION:  1. No Foci of Abnormal Hypermetabolism (Deauville 1). 2. Resolved activity in the right palatine tonsil, bilateral cervical nodes,left axillary node, retroperitoneal/abdominal pelvic adenopathy, bilateral inguinal nodes. Echo 2/14/19:  Normal cavity size, wall thickness and systolic function (ejection fraction normal). The muscle mass is normal. The cavity shape is normal. The estimated ejection fraction is 41 - 45%. Abnormal wall motion as described on the wall scoring diagram below. End-systolic volume is normal. Normal left ventricular strain. There is mild (grade 1) left ventricular diastolic dysfunction. Normal left ventricular diastolic pressure. End-diastolic volume is normal.    LE arterial duplex 2/22/19:  There is evidence of left groin pseudoaneurysm noted arising from distal common femoral artery, pseudo lobe measures 2.32cm x 2.58cm and pseudo neck length measuring 0.63cm. There is no evidence of hemodynamically significant left lower extremity arterial obstruction. JACLYN is 1.03 on the right and 1.02 on the left. LE arterial duplex s/p Thrombin Injection to Pseudoaneurysm 2/26/19:  Successful thrombin injection procedure of the left groin with no further flow seen. No evidence of hemodnyamically significant obstruction in the left lower extremity. Left lower extremity arterial duplex performed. Confirmed pseudoaneurysm in left groin with small neck. Following thrombin injection, no further flow seen in the pseudoaneurysm. The left common femoral, profunda femoral, femoral, popliteal, posterior tibial and anterior arteries were imaged. Mainly triphasic flow was seen with no evidence of significantly elevated velocities. Repeat LE arterial duplex 2/27/19:  Continued thrombosed left groin pseudoaneurysm following thrombin injection on 02/26/2019.   No flow or color fill is identified. The hematoma measures approximately 2.1 x 2.9 cm in diameter. The common femoral, deep femoral, femoral, and popliteal arteries are patent with mainly tri-phasic flow and no significant hemodynamically obstruction is noted. Stress 5/31/19:  · Normal stress myocardial perfusion without ischemia or infact at 84% MPHR. Normal LV function. LVEF 60%. · No EKG changes of ischemia at peak exercise. · Normal functional capacity. PET 6/03/19: IMPRESSION: No Foci of Abnormal Hypermetabolism (Deauville 1). -MRI lumbar spine 12/18/19:  Mild disc degenerative change at L3-4 and L4-5. Mild canal stenosis at L3-4 and mild left foraminal stenosis at L4-5. Other less severe degenerative findings are as described above. Continued diminished size of retroperitoneal mass-adenopathy,  with diminished soft tissue density at the left renal hilum. -CT chest/abdomen 12/16/19:  Findings are consistent with interval response to therapy    CT c/a/p 5/28/20: IMPRESSION:  Further contraction of the retroperitoneal mantle and chris mesentery,  compatible with treatment response  Stable left hilar soft tissue mass  Slight increased splaying of the duodenum and proximal jejunum is the result, without obstruction  No evidence for recurrence of lymphoma in the chest, abdomen, or pelvis       Assessment & Plan:   Rosendo Clark Sr. is a 42 y.o. male comes in for evaluation and management of lymphoma. 1. H/o Follicular lymphoma: Grade 3a. Although grade 3a disease is considered more indolent and can be treated like grade 1/2 disease, this patient has indications for treatment: bulky disease encircling the aorta causing symptoms. Bone marrow negative for lymphoma, but was hypercellular. BR better than RCHOP, but based on GALLIUM study, Obinutuzumab-based induction and maintenance prolongs PFS over that seen with rituximab-based therapy.  Therefore, I have chosen to treat patient with O-CHOP regimen for 6 cycles, followed by possible maintenance Obinutuzumab. We discussed the risks and benefits of O-CHOP chemotherapy. The potential side effects include, but are not limited to: nausea, vomiting, diarrhea, constipation, Flu-like symptoms, infusion reaction, allergic reaction, flushing, taste changes, increased risk of infection, anemia, fatigue, alopecia, neuropathy, nail changes, cardiac damage, mucositis, myleosuppression, infertility and rarely, death. Patient completed chemoteaching and received a chemotherapy education folder. CR after 2 cycles of O-CHOP, and O-Bendamustine due to cardiac event. Completed treatment 5/2019. PET completed 6/3/19 showed CR. CT 5/28/20 negative for disease. -- Surveillance now that pt is 2 yrs out from diagnosis and treatment will be H&P with labs q6mo. Imaging as clinically indicated. -- Can remove port now. New ordered placed. -- Follow up in 6 months with labs, MD visit. 2. Chronic lumbar back pain/anxiety: Left lower back pain is chronic and stable currently on Oxycodone and Lexapro daily. Signed pain contract on 12/28/18 and following with Dr. Bryna Sacks. 3. CAD: h/o STEMI 2/14/29 with TOM placed to proximal LAD. Following with Dr. Clifton Álvarez and remains on dual antiplatelet therapy, high dose Lipitor, Metoprolol, ACEI. Received only 2 doses of cardiotoxic chemo, Doxorubicin in early 1/2019. Is overdue for follow up with Dr. Clifton Álvarez. 4. Tobacco abuse: Discussed benefits of quitting smoking again. Previously discussed replacing hand-to-mouth habit with another item such as Twizzlers or SlimJims. I have personally seen and evaluated the patient in conjunction with Radha Ordaz NP. I find the patient's history and physical exam are consistent with the NP's documentation. I agree with the above assessment and plan, which I have modified as needed. Pt is doing well on surveillance with no clinical evidence of disease recurrence.  Advised he still work on quitting smoking and follow up with his Cardiologist.    Signed By: Dottie Mclaughlin MD     September 20, 2021

## 2021-09-20 NOTE — LETTER
9/20/2021    Patient: Ebony Jeronimo. YOB: 1975   Date of Visit: 9/20/2021     Marivel Snell MD  [address] [place] [address]  Via Fax: [phone]    Dear Marivel Snell MD,      Thank you for referring Mr. Raysa Mane to [facility] for evaluation. My notes for this consultation are attached. If you have questions, please do not hesitate to call me. I look forward to following your patient along with you.       Sincerely,    Shelley Modi NP

## 2021-09-21 ENCOUNTER — TELEPHONE (OUTPATIENT)
Dept: ONCOLOGY | Age: 46
End: 2021-09-21

## 2021-09-21 ENCOUNTER — TELEPHONE (OUTPATIENT)
Dept: CT IMAGING | Age: 46
End: 2021-09-21

## 2021-09-21 NOTE — TELEPHONE ENCOUNTER
Received a request from Michael Chicas at Dr [facility] Dale office yesterday to reach out to Mr Jeovany Paz about smoking cession. I spoke with him via phone, introduced myself and when classes would begin. He tells me he has tried nicotine patches and Chantix in the past. He was instructed to stop useinfg the patches d/t HTN and the Chantix \"quit working\". He is now smoking a ppd up from 1/2 ppd. We discussed possible alternatives to try. I gave him the [facility] website information for smoking cession tips. I will email him the information for the up coming class in January 2022.

## 2021-09-21 NOTE — TELEPHONE ENCOUNTER
[employer]  Social Work Navigator Encounter     Patient Name:  Carlie Josue Sr.     Medical History: lymphoma    Advance Directives: none on file; conversation deferred    Narrative: Social work referral received from Dr. Italia Mijares regarding smoking cessation resources. Called placed to patient and reviewed the following resources: [facility] ([phone]), [facility] ([phone]), [place] [facility] ([phone]), and [facility] ([phone]). Offered to mail resources to patient. Patient agreeable to referral to [facility].     Plan:  1. Will mail resources for smoking cessation to patient.    2. Will refer patient to the [facility].     Referral:   Other referral    Thank you,  Mirna Michelle LCSW

## 2021-11-08 ENCOUNTER — TELEPHONE (OUTPATIENT)
Dept: FAMILY MEDICINE CLINIC | Age: 46
End: 2021-11-08

## 2021-11-08 DIAGNOSIS — G89.29 CHRONIC LEFT-SIDED LOW BACK PAIN WITHOUT SCIATICA: ICD-10-CM

## 2021-11-08 DIAGNOSIS — M54.50 CHRONIC LEFT-SIDED LOW BACK PAIN WITHOUT SCIATICA: ICD-10-CM

## 2021-11-08 RX ORDER — OXYCODONE HYDROCHLORIDE 5 MG/1
5 TABLET ORAL
Qty: 80 TABLET | Refills: 0 | Status: SHIPPED | OUTPATIENT
Start: 2021-11-08 | End: 2021-12-08 | Stop reason: SDUPTHER

## 2021-11-08 NOTE — TELEPHONE ENCOUNTER
The patient called and stated that his usual pharmacy does not have oxycodone to fill his prescription. He said that [facility] at [address] has the medication but needs a prescription from Dr. Emely Taylor in order to fill. He asks if someone could take care of this for him.

## 2021-11-08 NOTE — TELEPHONE ENCOUNTER
Patient has a post dated script currently on file for Oxycodone 5 mg with [facility] [address]. However pharmacy does not have Oxycodone in stock.  Patient asking to re route [facility] as they do have oxycodone 5 mg in stock

## 2021-12-08 ENCOUNTER — TELEPHONE (OUTPATIENT)
Dept: PALLATIVE CARE | Age: 46
End: 2021-12-08

## 2021-12-08 DIAGNOSIS — G89.29 CHRONIC LEFT-SIDED LOW BACK PAIN WITHOUT SCIATICA: ICD-10-CM

## 2021-12-08 DIAGNOSIS — M54.50 CHRONIC LEFT-SIDED LOW BACK PAIN WITHOUT SCIATICA: ICD-10-CM

## 2021-12-08 RX ORDER — OXYCODONE HYDROCHLORIDE 5 MG/1
5 TABLET ORAL
Qty: 80 TABLET | Refills: 0 | Status: SHIPPED | OUTPATIENT
Start: 2021-12-08 | End: 2022-01-04 | Stop reason: SDUPTHER

## 2021-12-08 NOTE — TELEPHONE ENCOUNTER
Please send refill of [facility]cod[facility] to [facility] [address] [facility]. Patients states he has 2 left.

## 2021-12-08 NOTE — TELEPHONE ENCOUNTER
Triage for Controlled Substance Refill Request    Pain Diagnosis: _Chronic left-sided low back pain without sciatica (M54.50 , G89.29    Last Outpatient Visit: _9/2/2021    Next Outpatient Visit: _1/4/2022    Reason for refill needed outside of office visit? Appointment not scheduled prior to need for scheduled refill      Pharmacy: _[facility]/pharmacy #[id number] [place] [address]    Medication:oxyCODONE IR (ROXICODONE) 5 mg immediate release tablet       Dose and directions: Take 1 Tablet by mouth every four (4) hours as needed for Pain for up to 30 days.   Number dispensed:80  Date filled ( or Pharmacy):11/8/2021  # left:2 tabs         reviewed: _yes    Date of Urine Drug Screen:  _    Opioid Safety Handout given:  _yes    Appropriate for refill:  _yes    Action:  _ medication pending

## 2021-12-09 ENCOUNTER — TELEPHONE (OUTPATIENT)
Dept: PALLATIVE CARE | Age: 46
End: 2021-12-09

## 2021-12-09 NOTE — PROGRESS NOTES
Pt contacted office, she did not respond for accommedations. Forms have been updated and faxed to her and leave department as well.  Faxed to forms to scan into chart   Tamra Gonsalez is a 41 y.o. male here today for follow up of lymphoma. He reports pain to lower back, 6/10 today.

## 2021-12-15 NOTE — TELEPHONE ENCOUNTER
The patient called to advise the insurance had no record of auth request. Please give him a call back.

## 2021-12-15 NOTE — TELEPHONE ENCOUNTER
The patient is f/u on status of authorization. Adv it has been initiated on 12/9. Awaiting a response.

## 2021-12-15 NOTE — TELEPHONE ENCOUNTER
Authorization faxed to [facility] on 12/9/2021. Contacted insurer to check status. Insurer indicates form not received.  Form re faxed

## 2021-12-16 NOTE — TELEPHONE ENCOUNTER
Prior auth for Oxycodone approved. Call placed to patient for notification, no answer.  Left message with approval information

## 2021-12-20 ENCOUNTER — OFFICE VISIT (OUTPATIENT)
Dept: PODIATRY | Age: 46
End: 2021-12-20
Payer: COMMERCIAL

## 2021-12-20 VITALS
SYSTOLIC BLOOD PRESSURE: 146 MMHG | TEMPERATURE: 97.3 F | HEART RATE: 74 BPM | HEIGHT: 67 IN | WEIGHT: 158 LBS | DIASTOLIC BLOOD PRESSURE: 96 MMHG | BODY MASS INDEX: 24.8 KG/M2

## 2021-12-20 DIAGNOSIS — M79.671 BILATERAL FOOT PAIN: ICD-10-CM

## 2021-12-20 DIAGNOSIS — D23.72 ECCRINE POROMA OF LEFT FOOT: Primary | ICD-10-CM

## 2021-12-20 DIAGNOSIS — M79.672 BILATERAL FOOT PAIN: ICD-10-CM

## 2021-12-20 DIAGNOSIS — D23.71 ECCRINE POROMA OF RIGHT FOOT: ICD-10-CM

## 2021-12-20 PROCEDURE — 11308 SHAVE SKIN LESION >2.0 CM: CPT | Performed by: PODIATRIST

## 2021-12-20 PROCEDURE — 99213 OFFICE O/P EST LOW 20 MIN: CPT | Performed by: PODIATRIST

## 2021-12-20 NOTE — PROGRESS NOTES
[facility]    Subjective:         Patient is a 42 y.o. male who is being seen as a returning pt for bilateral foot pain. Patient states he has been suffering the pain for the past few months with an acute exacerbation of his pain. He states the pain is located where dense lesions have formed. He states the pain rises to the level of 6 out of 10. He states the pain is exacerbated with weightbearing. He denies any recent trauma. He denies any breaks in the skin. He denies any local/systemic signs of infection. He states she takes narcotics for chronic pain management as prescribed by pain management physician. He denies any other pedal complaints    Past Medical History:   Diagnosis Date    Anxiety     Cancer [facility])     lymphoma Nov 2018 receiving chemo    Chronic pain     lower back- lymphoma    Hyperlipidemia     Hypertension     Lymphadenopathy 11/12/2018     Past Surgical History:   Procedure Laterality Date    HX HEART CATHETERIZATION  02/2019    HX OTHER SURGICAL  02/2019    cardiac stent    IR INJECTION PSEUDOANEURYSM  2/26/2019       Family History   Problem Relation Age of Onset    Hypertension Father     Diabetes Father     Diabetes Mother       Social History     Tobacco Use    Smoking status: Current Every Day Smoker     Packs/day: 0.50     Years: 20.00     Pack years: 10.00    Smokeless tobacco: Never Used   Substance Use Topics    Alcohol use: No     No Known Allergies  Prior to Admission medications    Medication Sig Start Date End Date Taking? Authorizing Provider   oxyCODONE IR (ROXICODONE) 5 mg immediate release tablet Take 1 Tablet by mouth every four (4) hours as needed for Pain for up to 30 days. Max Daily Amount: 30 mg. 12/8/21 1/7/22  Marcel Montalvo MD   oxyCODONE IR (ROXICODONE) 5 mg immediate release tablet Take 1 Tablet by mouth every four (4) hours as needed for Pain for up to 30 days.  Max Daily Amount: 30 mg. 11/30/21 12/30/21  Selvin Antoinette Abdullahi MD   traZODone (DESYREL) 50 mg tablet Take 50 mg by mouth nightly. Take 50 mg nightly    Provider, Historical   potassium chloride (KLOR-CON) 10 mEq tablet Take 1 Tab by mouth daily. 2/12/21   Gamal Blake NP   escitalopram oxalate (LEXAPRO) 20 mg tablet TAKE 1 TABLET BY MOUTH EVERY DAY 2/11/21   Bentley Montalvo MD   aspirin 81 mg chewable tablet Take 81 mg by mouth daily. Provider, Historical   metoprolol succinate (TOPROL-XL) 50 mg XL tablet TAKE 1 TABLET BY MOUTH EVERY DAY 1/5/21   Tatyana Amador MD   L. acidoph & paracasei- S therm- Bifido (AUSTIN-Q/RISAQUAD) 8 billion cell cap cap take 1 cap daily  Patient not taking: Reported on 9/2/2021    Provider, Historical   LORazepam (Ativan) 0.5 mg tablet Take 1 Tab by mouth. Patient not taking: Reported on 6/10/2021    Provider, Historical   predniSONE (DELTASONE) 20 mg tablet take 5 tablet by oral route  every day. Take on Days 1 through 5 each cycle  Patient not taking: Reported on 6/10/2021    Provider, Historical   prochlorperazine (Compazine) 10 mg tablet Take 1 Tab by mouth. Patient not taking: Reported on 6/10/2021    Provider, Historical   senna-docusate (PERICOLACE) 8.6-50 mg per tablet Take 1 Tab by mouth. Patient not taking: Reported on 6/10/2021    Provider, Historical   lisinopriL (PRINIVIL, ZESTRIL) 40 mg tablet TAKE 1 TABLET BY MOUTH EVERY DAY 12/8/20   Gamal Blake NP   ondansetron hcl (ZOFRAN) 4 mg tablet Take 2 Tabs by mouth every eight (8) hours as needed for Nausea or Vomiting. Patient not taking: Reported on 6/10/2021 4/25/20   Josefina Rubalcava MD   atorvastatin (LIPITOR) 40 mg tablet TAKE 1 TAB BY MOUTH NIGHTLY. INDICATIONS: TREATMENT TO SLOW PROGRESSION OF CORONARY ARTERY DISEASE 3/31/20   Sen Pearl NP   naloxone [facility]) 4 mg/actuation nasal spray Use 1 spray intranasally, then discard. Repeat with new spray every 2 min as needed for opioid overdose symptoms, alternating nostrils.   Patient not taking: Reported on 9/2/2021 1/8/20   Marcel Dixon MD   clopidogrel (PLAVIX) 75 mg tab 1 Tab by Per NG tube route daily. 2/18/19   Abelardo Mcduffie MD       Review of Systems   Eyes: Negative. Respiratory: Negative. Endocrine: Negative. Genitourinary: Negative. Musculoskeletal: Positive for arthralgias. Allergic/Immunologic: Negative. Neurological: Negative. Hematological: Negative. Psychiatric/Behavioral: Negative. All other systems reviewed and are negative. Objective:     Visit Vitals  BP (!) 146/96 (BP 1 Location: Left upper arm, BP Patient Position: Sitting, BP Cuff Size: Large adult)   Pulse 74   Temp 97.3 °F (36.3 °C) (Temporal)   Ht 5' 7\" (1.702 m)   Wt 158 lb (71.7 kg)   BMI 24.75 kg/m²       Physical Exam  Vitals reviewed. Constitutional:       Appearance: He is normal weight. Cardiovascular:      Pulses:           Dorsalis pedis pulses are 2+ on the right side and 2+ on the left side. Posterior tibial pulses are 2+ on the right side and 2+ on the left side. Pulmonary:      Effort: Pulmonary effort is normal.   Musculoskeletal:      Right lower leg: No edema. Left lower leg: No edema. Right foot: Normal range of motion. Prominent metatarsal heads present. No deformity. Left foot: Normal range of motion. Prominent metatarsal heads present. No deformity. Feet:      Right foot:      Protective Sensation: 10 sites tested. 10 sites sensed. Skin integrity: Callus present. Toenail Condition: Right toenails are normal.      Left foot:      Skin integrity: Callus present. Toenail Condition: Left toenails are normal.   Lymphadenopathy:      Lower Body: No right inguinal adenopathy. No left inguinal adenopathy. Skin:     General: Skin is warm. Capillary Refill: Capillary refill takes 2 to 3 seconds. Neurological:      Mental Status: He is alert and oriented to person, place, and time.    Psychiatric:         Mood and Affect: Mood and affect normal.         Behavior: Behavior is cooperative. Data Review: No results found for this or any previous visit (from the past 24 hour(s)). Impression:       ICD-10-CM ICD-9-CM    1. Eccrine poroma of left foot  D23.72 216.7    2. Eccrine poroma of right foot  D23.71 216.7    3. Bilateral foot pain  M79.671 729.5     M79.672           Recommendation:     Patient seen and evaluated in the office  Discussed educated patient regarding his current medical condition  A total of 2 painful raised hyper pigmented benign epidermal neoplasms were noted to the plantar aspect of both feet (1 to the right and 1 to the left). Both lesions underwent a shave excision with a #15 blade removing the entire lesion with the greatest diameter of the lesion on the right measuring 23 millimeters and the greatest diameter of the lesion on the left measuring is 27 mm. Hemostasis and anesthesia as needed. An appropriate dressing was applied. Instructed patient he needs to limit focal pressure and shear forces to prevent the lesions from returning      [place] [id number].  Rizwana Gutierres, [address] [facility]  [address] [place] [address] [address], [address]  O: [phone]  F: [phone]  C: [phone]

## 2021-12-20 NOTE — PROGRESS NOTES
Chief Complaint   Patient presents with    Callouses     1. Have you been to the ER, urgent care clinic since your last visit? Hospitalized since your last visit? No    2. Have you seen or consulted any other health care providers outside of the [address] [place] [address] since your last visit? Include any pap smears or colon screening.  No  PCP-Dr Olinda Bazan

## 2022-01-04 ENCOUNTER — VIRTUAL VISIT (OUTPATIENT)
Dept: PALLATIVE CARE | Age: 47
End: 2022-01-04
Payer: COMMERCIAL

## 2022-01-04 DIAGNOSIS — R63.0 POOR APPETITE: ICD-10-CM

## 2022-01-04 DIAGNOSIS — I25.10 CORONARY ARTERY DISEASE INVOLVING NATIVE CORONARY ARTERY OF NATIVE HEART, UNSPECIFIED WHETHER ANGINA PRESENT: ICD-10-CM

## 2022-01-04 DIAGNOSIS — M54.50 CHRONIC LEFT-SIDED LOW BACK PAIN WITHOUT SCIATICA: Primary | ICD-10-CM

## 2022-01-04 DIAGNOSIS — G89.29 CHRONIC LEFT-SIDED LOW BACK PAIN WITHOUT SCIATICA: Primary | ICD-10-CM

## 2022-01-04 DIAGNOSIS — F32.9 REACTIVE DEPRESSION: ICD-10-CM

## 2022-01-04 DIAGNOSIS — G47.00 INSOMNIA, UNSPECIFIED TYPE: ICD-10-CM

## 2022-01-04 DIAGNOSIS — R53.83 OTHER FATIGUE: ICD-10-CM

## 2022-01-04 DIAGNOSIS — C82.90 FOLLICULAR LYMPHOMA, UNSPECIFIED FOLLICULAR LYMPHOMA TYPE, UNSPECIFIED BODY REGION (HCC): ICD-10-CM

## 2022-01-04 DIAGNOSIS — F41.9 ANXIETY: ICD-10-CM

## 2022-01-04 PROCEDURE — 99214 OFFICE O/P EST MOD 30 MIN: CPT | Performed by: INTERNAL MEDICINE

## 2022-01-04 RX ORDER — OXYCODONE HYDROCHLORIDE 5 MG/1
5 TABLET ORAL
Qty: 80 TABLET | Refills: 0 | Status: SHIPPED | OUTPATIENT
Start: 2022-03-16 | End: 2022-03-02 | Stop reason: DRUGHIGH

## 2022-01-04 RX ORDER — OXYCODONE HYDROCHLORIDE 5 MG/1
5 TABLET ORAL
Qty: 80 TABLET | Refills: 0 | Status: SHIPPED | OUTPATIENT
Start: 2022-01-15 | End: 2022-02-14

## 2022-01-04 RX ORDER — OXYCODONE HYDROCHLORIDE 5 MG/1
5 TABLET ORAL
Qty: 80 TABLET | Refills: 0 | Status: SHIPPED | OUTPATIENT
Start: 2022-02-14 | End: 2022-03-02 | Stop reason: DRUGHIGH

## 2022-01-04 NOTE — PROGRESS NOTES
Palliative Medicine Office Visit  Palliative Medicine Nurse Check In  [phone] ([phone])    Patient Name: Brandi Benavides. YOB: 1975      Date of Office Visit: 1/4/2022    Patient states: \"  \"    From Check In Sheet (scanned in Media):  Has a medical provider talked with you about cardiopulmonary resuscitation (CPR)? [x] Yes   [] No   [] Unable to obtain    Nurse reminder to complete or update ACP FlowSheet:    Is ACP on the Problem List?    [] Yes    [x] No  IF ACP Document is ON FILE; Nurse to place ACP on Problem List     Is there an ACP Note in Chart Review/Note? [x] Yes    [] No   If NO: ALERT PROVIDER          Primary Decision Maker: [de-identified] - Ex-Spouse - [phone]  Advance Care Planning 1/4/2022   Patient's Healthcare Decision Maker is: Legal Next of Kin   Confirm Advance Directive None   Patient Would Like to Complete Advance Directive -   Does the patient have other document types -       Is there anything that we should know about you as a person in order to provide you the best care possible? Have you been to the ER, urgent care clinic since your last visit? [] Yes   [x] No   [] Unable to obtain    Have you been hospitalized since your last visit? [] Yes   [x] No   [] Unable to obtain    Have you seen or consulted any other health care providers outside of the [address] [place] [address] since your last visit?    [] Yes   [x] No   [] Unable to obtain    Functional status (describe):         Last BM: 1/3/2022     accessed (date): 1/4/2022    Bottle review (for opioid pain medication):  Medication 1:   Date filled:   Directions:   # filled:   # left:   # pills taking per day:  Last dose taken:    Medication 2:   Date filled:   Directions:   # filled:   # left:   # pills taking per day:  Last dose taken:    Medication 3:   Date filled:   Directions:   # filled:   # left:   # pills taking per day:  Last dose taken:    Medication 4:   Date filled:   Directions:   # filled:   # left:   # pills taking per day:  Last dose taken:

## 2022-01-04 NOTE — PROGRESS NOTES
Palliative Medicine Outpatient Services  [place]: [phone] ([phone])    Patient Name: Camila Luo. YOB: 1975    Date of Current Visit: 01/04/22  Location of Current Visit:    [] [facility] Office  [] [facility] Office  [] [address] [place] [address]  [] Home  [x] Other:  televisit    Date of Initial Visit: 2/19/19   Referral from: Jesse Vasquez MD  Primary Care Physician: Moon Ramirez MD      SUMMARY:   Camila Gupta is a 42y.o. year old with high-grade follicular lymphoma, who was referred to Palliative Medicine by Dr. Ailyn Bailey for symptom management and supportive care. He was diagnosed in 11/2018 after presenting with back pain. He is currently receiving systemic chemotherapy. He suffered cardiac arrest during cycle #3 due to previously undiagnosed severe stenosis of the LAD s/p PTCA and stent. He has since completed systemic chemotherapy. His most recent PET-CT (5/2019) showed no focal areas of increased hypermetabolic uptake. The patients social history includes: he is single. He lives in [place] with his current girlfriend and their infant son. Jose Juan Degroot He has 3 teenage children who live with their mother and with whom he has close contact. 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 Palliative Medicine is addressing the following current patient/family concerns: anxiety, depression, left mid- and low back pain, poor appetite, fatigue, advanced care planning. Initial Referral Intake note from Prosper Li RN reviewed prior to visit   PALLIATIVE DIAGNOSES:       ICD-10-CM ICD-9-CM    1. Chronic left-sided low back pain without sciatica  M54.50 724.2 oxyCODONE IR (ROXICODONE) 5 mg immediate release tablet    G89.29 338.29 oxyCODONE IR (ROXICODONE) 5 mg immediate release tablet      oxyCODONE IR (ROXICODONE) 5 mg immediate release tablet   2. Anxiety  F41.9 300.00    3. Reactive depression  F32.9 300.4    4. Other fatigue  R53.83 780.79    5. Insomnia, unspecified type  G47.00 780.52    6.  Poor appetite  R63.0 783.0    7. Follicular lymphoma, unspecified follicular lymphoma type, unspecified body region ([facility]  C82.90 202.00    8. Coronary artery disease involving native coronary artery of native heart, unspecified whether angina present  I25.10 414.01           PLAN:   Patient Instructions       Dear Andres Conley. ,    It was a pleasure seeing you today via virtual visit    We will see you again on Monday 3/21/22 to coordinate with your appointment with Dr. Ever Dey. If labs or imaging tests have been ordered for you today, please call the office at [phone] 48 hours after completion to obtain the results. Your described symptoms were: Fatigue: 5 Drowsiness: 4   Depression: 3 Pain: 6   Anxiety: 9 Nausea: 1   Anorexia: 0 Dyspnea: 0   Best Well-Bein Constipation: No   Other Problem (Comment): 0       This is the plan we talked about:      1. Back pain   -Your chronic low back pain is unchanged   -You continue to have \"good days\" and \"bad days\" depending upon your activity level  -Your pain is manageable with your current oxycodone dose  -You continue to work full-time in a managerial position at [employer] oxycodone 5-mg every 4 hours as needed  -You have a prescription ready for pick-up today and refills for  on 1/15, , 3/16  -Continue tylenol as needed for moderate pain  -Continue heat or ice as needed    2. Anxiety/depression  -You have chronic anxiety which can feel debilitating to you at times  -You met briefly in the past with the Palliative Medicine   -Continue escitalopram to 20-mg daily    3. Fatigue  -This is chronic and unchanged  -Continue trazodone 50-mg at bedtime as needed for sleep    4. Poor appetite  -This was an issue for you in the past  -You're now eating well and your weight is stable    5.  Lymphoma  -You received systemic therapy, completed 19 with complete response  -CT scan 2020 showed no evidence of recurrent lymphoma  -You now see Dr. Michelle Beaver every 6 months for surveillance  -You saw Dr. Michelle Beaver last September for routine follow-up  -You haven't yet scheduled an appointment to have your port removed    6. Primary care  -You're now being followed by Dr. Dawit Carlson    7. Coronary artery disease/history of cardiac arrest  -Continue clopidogrel (Plavix)   -Tyrone Velasco LCSW in Dr. Leno Rubio office, shared smoking cessation resources with you last September    This is what you have shared with us about Advance Care Planning:      Primary Decision Maker: [de-identified] - Ex-Spouse - [phone]  [] Named in a scanned document   [x] Legal Next of Kin  [] Guardian    ACP documents you current have include:  [] Advance Directive or Living Will  [] Durable Do Not Resuscitate  [] Physician Orders for Scope of Treatment (POST)  [] Medical Power of   [] Other      The Palliative Medicine Team is here to support you and your family.          Sincerely,      Nitesh Copeland MD and the Palliative Medicine Team       GOALS OF CARE / TREATMENT PREFERENCES:   [====Goals of Care====]  GOALS OF CARE:  Patient / health care proxy stated goals: See Patient Instructions / Summary    TREATMENT PREFERENCES:   Code Status:  [x] Attempt Resuscitation       [] Do Not Attempt Resuscitation    Advance Care Planning:  [x] The Pall Med Interdisciplinary Team has updated the ACP Navigator with Decision Maker and Patient Capacity      Primary Decision Maker: [de-identified] - Ex-Spouse - [phone]  [] Named in a scanned document   [x] Legal Next of Kin  [] Guardian    Other:  (If patient appropriate for POST, consider using PALLPOST smart phrase here)    The palliative care team has discussed with patient / health care proxy about goals of care / treatment preferences for patient.  [====Goals of Care====]     PRESCRIPTIONS GIVEN:     Medications Ordered Today   Medications    oxyCODONE IR (ROXICODONE) 5 mg immediate release tablet     Sig: Take 1 Tablet by mouth every four (4) hours as needed for Pain for up to 30 days. Max Daily Amount: 30 mg. Dispense:  80 Tablet     Refill:  0    oxyCODONE IR (ROXICODONE) 5 mg immediate release tablet     Sig: Take 1 Tablet by mouth every four (4) hours as needed for Pain for up to 30 days. Max Daily Amount: 30 mg. Dispense:  80 Tablet     Refill:  0    oxyCODONE IR (ROXICODONE) 5 mg immediate release tablet     Sig: Take 1 Tablet by mouth every four (4) hours as needed for Pain for up to 30 days. Max Daily Amount: 30 mg. Dispense:  80 Tablet     Refill:  0           FOLLOW UP:     Future Appointments   Date Time Provider Dahlia Supriya   3/21/2022 10:30 AM SS INF7 CH2 <1H RCHICS Tj THOMAS   3/21/2022 10:45 AM Lorena Blake, NP ONCSF BS AMB   6/27/2022 10:00 AM Sandy Coon, KARYNM RPP BS AMB           PHYSICIANS INVOLVED IN CARE:   Patient Care Team:  Nik Fierro MD as PCP - General (Family Medicine)  Evan Toscano MD (Hematology and Oncology)  Edwar Anthony MD as Physician (Palliative Medicine)  Edwar Anthony MD as Physician (Palliative Medicine)       HISTORY:   Reviewed patient-completed ESAS and advance care planning form. Reviewed patient record in prescription monitoring program.    CHIEF COMPLAINT:   Chief Complaint   Patient presents with    Back Pain       HPI/SUBJECTIVE:    The patient is: [x] Verbal / [] Nonverbal     See Plan/Patient Instructions for interval history    From IV 2/29/19:  He has a lot of anxiety. He's lived with anxiety for a long time, sometimes it's been worse than others, like when he lost his job and lost his insurance. He took Wellbutrin then which made him more anxious and depressed. He's had more anxiety since he was diagnosed with lymphoma. He's been taking lorazepam and it doesn't help at all, even when he tried taking 2 pills. This is his biggest problem now.  He feels depressed but it's not as bad as the anxiety.     He has pain in his back, that's what brought him to the hospital in November. He's been taking oxycodone which helps some. The groin pain started after his operation (cardiac cath) in the hospital last week. He expects that will get better as it heals. His pain doesn't bother him too much, not like the anxiety.     His appetite is getting better. He's lost ~30# since last fall but that's leveled off now.     He's moving his bowels regularly.     He sometimes gets short of breath but not as much as used to.     He doesn't have chest pain, never did.     He sleeps well at night. He doesn't have the energy to do much during the day.     He lives with his father. He sees his three teen-age children frequently (they live with their mother). His children give him a lot of strength.     Clinical Pain Assessment (nonverbal scale for nonverbal patients):   [++++ Clinical Pain Assessment++++]  [++++Pain Severity++++]: Pain: 6  [++++Pain Character++++]: stabbing pain in back  [++++Pain Duration++++]: months for back pain, weeks for groin pain  [++++Pain Effect++++]: little  [++++Pain Factors++++]: oxycodone helps with back pain, groin pain elicited by standing and walking  [++++Pain Frequency++++]: constant back pain with varying intensity  [++++Pain Location++++]: left lower back  [++++ Clinical Pain Assessment++++]       FUNCTIONAL ASSESSMENT:     Palliative Performance Scale (PPS):  PPS: 70       PSYCHOSOCIAL/SPIRITUAL SCREENING:     Any spiritual / Tenriism concerns:  [] Yes /  [x] No    Caregiver Burnout:  [] Yes /  [x] No /  [] No Caregiver Present      Anticipatory grief assessment:   [x] Normal  / [] Maladaptive       ESAS Anxiety: Anxiety: 9    ESAS Depression: Depression: 3       REVIEW OF SYSTEMS:     The following systems were [x] reviewed / [] unable to be reviewed  Systems: constitutional, ears/nose/mouth/throat, respiratory, gastrointestinal, genitourinary, musculoskeletal, integumentary, neurologic, psychiatric, endocrine. Positive findings noted below. Modified ESAS Completed by: provider   Fatigue: 5 Drowsiness: 4   Depression: 3 Pain: 6   Anxiety: 9 Nausea: 1   Anorexia: 0 Dyspnea: 0   Best Well-Bein Constipation: No   Other Problem (Comment): 0          PHYSICAL EXAM:     Wt Readings from Last 3 Encounters:   21 158 lb (71.7 kg)   21 158 lb (71.7 kg)   21 154 lb (69.9 kg)     There were no vitals taken for this visit. Last bowel movement: See Nursing Note    Constitutional: sitting in chair, appears rested  Eyes: pupils equal, anicteric  ENMT: no nasal discharge, moist mucous membranes  Cardiovascular: regular rhythm, no peripheral edema  Respiratory: breathing not labored, symmetric  Gastrointestinal: soft non-tender, +bowel sounds  Musculoskeletal: no deformity; right elbow with no redness or swelling, point tenderness. Full ROM right elbow  Skin: warm, dry  Neurologic: following commands, moving all extremities  Psychiatric: full affect, no hallucinations  Other:        HISTORY:     Past Medical History:   Diagnosis Date    Anxiety     Cancer [facility])     lymphoma 2018 receiving chemo    Chronic pain     lower back- lymphoma    Hyperlipidemia     Hypertension     Lymphadenopathy 2018      Past Surgical History:   Procedure Laterality Date    HX HEART CATHETERIZATION  2019    HX OTHER SURGICAL  2019    cardiac stent    IR INJECTION PSEUDOANEURYSM  2019      Family History   Problem Relation Age of Onset    Hypertension Father     Diabetes Father     Diabetes Mother       History reviewed, no pertinent family history.   Social History     Tobacco Use    Smoking status: Current Every Day Smoker     Packs/day: 0.50     Years: 20.00     Pack years: 10.00    Smokeless tobacco: Never Used   Substance Use Topics    Alcohol use: No     No Known Allergies   Current Outpatient Medications   Medication Sig    [START ON 1/15/2022] oxyCODONE IR (ROXICODONE) 5 mg immediate release tablet Take 1 Tablet by mouth every four (4) hours as needed for Pain for up to 30 days. Max Daily Amount: 30 mg.    [START ON 2/14/2022] oxyCODONE IR (ROXICODONE) 5 mg immediate release tablet Take 1 Tablet by mouth every four (4) hours as needed for Pain for up to 30 days. Max Daily Amount: 30 mg.    [START ON 3/16/2022] oxyCODONE IR (ROXICODONE) 5 mg immediate release tablet Take 1 Tablet by mouth every four (4) hours as needed for Pain for up to 30 days. Max Daily Amount: 30 mg.    traZODone (DESYREL) 50 mg tablet Take 50 mg by mouth nightly. Take 50 mg nightly    potassium chloride (KLOR-CON) 10 mEq tablet Take 1 Tab by mouth daily.  escitalopram oxalate (LEXAPRO) 20 mg tablet TAKE 1 TABLET BY MOUTH EVERY DAY    aspirin 81 mg chewable tablet Take 81 mg by mouth daily.  metoprolol succinate (TOPROL-XL) 50 mg XL tablet TAKE 1 TABLET BY MOUTH EVERY DAY    lisinopriL (PRINIVIL, ZESTRIL) 40 mg tablet TAKE 1 TABLET BY MOUTH EVERY DAY    atorvastatin (LIPITOR) 40 mg tablet TAKE 1 TAB BY MOUTH NIGHTLY. INDICATIONS: TREATMENT TO SLOW PROGRESSION OF CORONARY ARTERY DISEASE    clopidogrel (PLAVIX) 75 mg tab 1 Tab by Per NG tube route daily.  L. acidoph & paracasei- S therm- Bifido (AUSTIN-Q/RISAQUAD) 8 billion cell cap cap take 1 cap daily (Patient not taking: Reported on 9/2/2021)    LORazepam (Ativan) 0.5 mg tablet Take 1 Tab by mouth. (Patient not taking: Reported on 6/10/2021)    predniSONE (DELTASONE) 20 mg tablet take 5 tablet by oral route  every day. Take on Days 1 through 5 each cycle (Patient not taking: Reported on 6/10/2021)    prochlorperazine (Compazine) 10 mg tablet Take 1 Tab by mouth. (Patient not taking: Reported on 6/10/2021)    senna-docusate (PERICOLACE) 8.6-50 mg per tablet Take 1 Tab by mouth. (Patient not taking: Reported on 6/10/2021)    ondansetron hcl (ZOFRAN) 4 mg tablet Take 2 Tabs by mouth every eight (8) hours as needed for Nausea or Vomiting.  (Patient not taking: Reported on 6/10/2021)    naloxone [facility]) 4 mg/actuation nasal spray Use 1 spray intranasally, then discard. Repeat with new spray every 2 min as needed for opioid overdose symptoms, alternating nostrils. (Patient not taking: Reported on 9/2/2021)     No current facility-administered medications for this visit. LAB DATA REVIEWED:     Lab Results   Component Value Date/Time    WBC 8.2 09/02/2021 10:43 AM    HGB 14.0 09/02/2021 10:43 AM    PLATELET 591 02/20/2021 10:43 AM     Lab Results   Component Value Date/Time    Sodium 139 09/02/2021 10:43 AM    Potassium 3.9 09/02/2021 10:43 AM    Chloride 111 (H) 09/02/2021 10:43 AM    CO2 24 09/02/2021 10:43 AM    BUN 16 09/02/2021 10:43 AM    Creatinine 1.11 09/02/2021 10:43 AM    Calcium 8.4 (L) 09/02/2021 10:43 AM    Magnesium 1.7 01/12/2019 04:05 AM    Phosphorus 2.0 (L) 01/12/2019 04:05 AM      Lab Results   Component Value Date/Time    Alk. phosphatase 106 12/10/2020 11:19 AM    Protein, total 6.6 12/10/2020 11:19 AM    Albumin 3.5 12/10/2020 11:19 AM    Globulin 3.1 12/10/2020 11:19 AM     Lab Results   Component Value Date/Time    INR 1.1 02/22/2019 08:18 PM    Prothrombin time 10.8 02/22/2019 08:18 PM    aPTT 27.8 02/22/2019 08:18 PM      No results found for: IRON, FE, TIBC, IBCT, PSAT, FERR       MRI lumbar spine 12/18/19:  Congenital narrowing of the lumbar canal. Vertebral body heights are maintained. Marrow signal is normal.     The conus medullaris terminates at T12/L1. Signal and caliber of the distal  spinal cord are within normal limits. There is no pathologic intrathecal  enhancement.     The paraspinal soft tissues are within normal limits.     Lower thoracic spine: No herniation or stenosis.     L1-L2: No herniation or stenosis.     L2-L3: No herniation or stenosis.     L3-L4: Mild facet arthropathy. Minimal central disc protrusion. Mild canal  stenosis. Foramina are patent     L4-L5: Desiccation. Mild facet arthropathy.  Canal demonstrates minimal stenosis. There is an annular ligament tear far laterally on the left. Mild left foraminal  stenosis.     L5-S1: Mild facet arthropathy. Canal and foramina are patent. IMPRESSION:  Mild disc degenerative change at L3-4 and L4-5.     Mild canal stenosis at L3-4 and mild left foraminal stenosis at L4-5.     Other less severe degenerative findings are as described above. CT chest/abdomen 12/16/19:  THYROID: No nodule. MEDIASTINUM: No mass or lymphadenopathy. Port catheter in place on the right. Catheter tip in appropriate position. SB: No mass or lymphadenopathy. THORACIC AORTA: No dissection or aneurysm. MAIN PULMONARY ARTERY: Unremarkable  TRACHEA/BRONCHI: Unremarkable  ESOPHAGUS: No wall thickening or dilatation. HEART: Normal in size. PLEURA: No effusion or pneumothorax. LUNGS: Bibasilar atelectasis is noted. Piero Amend LIVER: No mass or biliary dilatation. GALLBLADDER: Layering contrast versus cholelithiasis. SPLEEN: No mass. PANCREAS: No mass or ductal dilatation. ADRENALS: The left adrenal gland is elevated by the retroperitoneal mass. The    right is unremarkable. KIDNEYS: There is diminished soft tissue density at the level of the left renal  hilum. There is increased left renal cortical atrophy. STOMACH: Unremarkable. SMALL BOWEL: No dilatation or wall thickening. COLON: No dilatation or wall thickening. APPENDIX: Unremarkable. PERITONEUM: No ascites or pneumoperitoneum. RETROPERITONEUM:   Diminished size of retroperitoneal mass. Diminished in size with margins difficult to fully ascertain. 2-62 35 x 50 mm previously 71 x 94 mm.     2-67 left periaortic adenopathy 25 x 17 mm  The SMA, celiac, and LIZZY are remain encased, diminished attenuation of these  vessels.      REPRODUCTIVE ORGANS: The prostate and seminal vesicles are unremarkable. URINARY  BLADDER: Unremarkable  BONES: No destructive bone lesion.     ADDITIONAL COMMENTS: Resolved left inguinal pseudoaneurysm. .       IMPRESSION:    Baseline research examination. Findings are consistent with interval response to therapy.      Continued diminished size of retroperitoneal mass-adenopathy,  with diminished soft tissue density at the left renal hilum.      CT chest/abdomen/pelvis 5/28/2020:  Further contraction of the retroperitoneal mantle and chris mesentery,  compatible with treatment response  Stable left hilar soft tissue mass  Slight increased splaying of the duodenum and proximal jejunum is the result,  without obstruction  No evidence for recurrence of lymphoma in the chest, abdomen, or pelvis     CONTROLLED SUBSTANCES SAFETY ASSESSMENT (IF ON CONTROLLED SUBSTANCES):     Reviewed opioid safety handout:  [x] Yes   [] No  24 hour opioid dose >150mg morphine equivalent/day:  [] Yes   [x] No  Benzodiazepines:  [x] Yes   [] No  Sleep apnea:  [] Yes   [x] No  Urine Toxicology Testing within last 6 months:  [] Yes   [x] No  History of or new aberrant medication taking behaviors:  [] Yes   [x] No  Has Narcan been prescribed [x] Yes   [] No          Total time:   Counseling / coordination time:   > 50% counseling / coordination?:

## 2022-01-04 NOTE — PATIENT INSTRUCTIONS
Dear Aniyah Felt. ,    It was a pleasure seeing you today via virtual visit    We will see you again on Monday 3/21/22 to coordinate with your appointment with Dr. Steve Noe. If labs or imaging tests have been ordered for you today, please call the office at [phone] 48 hours after completion to obtain the results. Your described symptoms were: Fatigue: 5 Drowsiness: 4   Depression: 3 Pain: 6   Anxiety: 9 Nausea: 1   Anorexia: 0 Dyspnea: 0   Best Well-Bein Constipation: No   Other Problem (Comment): 0       This is the plan we talked about:      1. 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 Anxiety/depression  -You have chronic anxiety which can feel debilitating to you at times  -You met briefly in the past with the Palliative Medicine   -Continue escitalopram to 20-mg daily    3. Fatigue  -This is chronic and unchanged  -Continue trazodone 50-mg at bedtime as needed for sleep    4. Poor appetite  -This was an issue for you in the past  -You're now eating well and your weight is stable    5. Lymphoma  -You received systemic therapy, completed 19 with complete response  -CT scan 2020 showed no evidence of recurrent lymphoma  -You now see Dr. Steve Noe every 6 months for surveillance  -You saw Dr. Steve Noe last September for routine follow-up  -You haven't yet scheduled an appointment to have your port removed    6. Primary care  -You're now being followed by Dr. Hilaria Kramer    7.  Coronary artery disease/history of cardiac arrest  -Continue clopidogrel (Plavix)   -Genesis Heath LCSW in Dr. Casanova Corporal office, shared smoking cessation resources with you last September    This is what you have shared with us about Advance Care Planning:      Primary Decision Maker: [de-identified] - Ex-Spouse - [phone]  [] Named in a scanned document   [x] Legal Next of Kin  [] Guardian    ACP documents you current have include:  [] Advance Directive or Living Will  [] Durable Do Not Resuscitate  [] Physician Orders for Scope of Treatment (POST)  [] Medical Power of   [] Other      The Palliative Medicine Team is here to support you and your family.          Sincerely,      Marci Johansen MD and the Palliative Medicine Team

## 2022-02-13 ENCOUNTER — APPOINTMENT (OUTPATIENT)
Dept: CT IMAGING | Age: 47
End: 2022-02-13
Attending: EMERGENCY MEDICINE
Payer: COMMERCIAL

## 2022-02-13 ENCOUNTER — APPOINTMENT (OUTPATIENT)
Dept: ULTRASOUND IMAGING | Age: 47
End: 2022-02-13
Attending: EMERGENCY MEDICINE
Payer: COMMERCIAL

## 2022-02-13 ENCOUNTER — HOSPITAL ENCOUNTER (EMERGENCY)
Age: 47
Discharge: HOME | End: 2022-02-14
Attending: EMERGENCY MEDICINE
Payer: COMMERCIAL

## 2022-02-13 VITALS
BODY MASS INDEX: 23.54 KG/M2 | SYSTOLIC BLOOD PRESSURE: 173 MMHG | WEIGHT: 150 LBS | TEMPERATURE: 97.7 F | DIASTOLIC BLOOD PRESSURE: 96 MMHG | HEART RATE: 89 BPM | HEIGHT: 67 IN | RESPIRATION RATE: 18 BRPM | OXYGEN SATURATION: 100 %

## 2022-02-13 DIAGNOSIS — K63.89 COLONIC MASS: Primary | ICD-10-CM

## 2022-02-13 LAB
ALBUMIN SERPL-MCNC: 3.3 G/DL (ref 3.5–5)
ALBUMIN/GLOB SERPL: 0.9 {RATIO} (ref 1.1–2.2)
ALP SERPL-CCNC: 112 U/L (ref 45–117)
ALT SERPL-CCNC: 14 U/L (ref 12–78)
ANION GAP SERPL CALC-SCNC: 3 MMOL/L (ref 5–15)
APPEARANCE UR: CLEAR
AST SERPL-CCNC: 7 U/L (ref 15–37)
BACTERIA URNS QL MICRO: NEGATIVE /HPF
BASOPHILS # BLD: 0.1 K/UL (ref 0–0.1)
BASOPHILS NFR BLD: 1 % (ref 0–1)
BILIRUB SERPL-MCNC: 0.1 MG/DL (ref 0.2–1)
BILIRUB UR QL: NEGATIVE
BUN SERPL-MCNC: 15 MG/DL (ref 6–20)
BUN/CREAT SERPL: 13 (ref 12–20)
CALCIUM SERPL-MCNC: 9 MG/DL (ref 8.5–10.1)
CHLORIDE SERPL-SCNC: 110 MMOL/L (ref 97–108)
CO2 SERPL-SCNC: 27 MMOL/L (ref 21–32)
COLOR UR: ABNORMAL
COMMENT, HOLDF: NORMAL
CREAT SERPL-MCNC: 1.19 MG/DL (ref 0.7–1.3)
DIFFERENTIAL METHOD BLD: ABNORMAL
EOSINOPHIL # BLD: 0.5 K/UL (ref 0–0.4)
EOSINOPHIL NFR BLD: 4 % (ref 0–7)
EPITH CASTS URNS QL MICRO: ABNORMAL /LPF
ERYTHROCYTE [DISTWIDTH] IN BLOOD BY AUTOMATED COUNT: 13.2 % (ref 11.5–14.5)
GLOBULIN SER CALC-MCNC: 3.8 G/DL (ref 2–4)
GLUCOSE SERPL-MCNC: 115 MG/DL (ref 65–100)
GLUCOSE UR STRIP.AUTO-MCNC: NEGATIVE MG/DL
HCT VFR BLD AUTO: 32.9 % (ref 36.6–50.3)
HGB BLD-MCNC: 10.5 G/DL (ref 12.1–17)
HGB UR QL STRIP: ABNORMAL
HYALINE CASTS URNS QL MICRO: ABNORMAL /LPF (ref 0–5)
IMM GRANULOCYTES # BLD AUTO: 0 K/UL (ref 0–0.04)
IMM GRANULOCYTES NFR BLD AUTO: 0 % (ref 0–0.5)
KETONES UR QL STRIP.AUTO: NEGATIVE MG/DL
LEUKOCYTE ESTERASE UR QL STRIP.AUTO: NEGATIVE
LIPASE SERPL-CCNC: 203 U/L (ref 73–393)
LYMPHOCYTES # BLD: 1.9 K/UL (ref 0.8–3.5)
LYMPHOCYTES NFR BLD: 18 % (ref 12–49)
MCH RBC QN AUTO: 27.9 PG (ref 26–34)
MCHC RBC AUTO-ENTMCNC: 31.9 G/DL (ref 30–36.5)
MCV RBC AUTO: 87.3 FL (ref 80–99)
MONOCYTES # BLD: 0.7 K/UL (ref 0–1)
MONOCYTES NFR BLD: 7 % (ref 5–13)
NEUTS SEG # BLD: 7.4 K/UL (ref 1.8–8)
NEUTS SEG NFR BLD: 70 % (ref 32–75)
NITRITE UR QL STRIP.AUTO: NEGATIVE
NRBC # BLD: 0 K/UL (ref 0–0.01)
NRBC BLD-RTO: 0 PER 100 WBC
PH UR STRIP: 6 [PH] (ref 5–8)
PLATELET # BLD AUTO: 349 K/UL (ref 150–400)
PMV BLD AUTO: 9.9 FL (ref 8.9–12.9)
POTASSIUM SERPL-SCNC: 3.9 MMOL/L (ref 3.5–5.1)
PROT SERPL-MCNC: 7.1 G/DL (ref 6.4–8.2)
PROT UR STRIP-MCNC: NEGATIVE MG/DL
RBC # BLD AUTO: 3.77 M/UL (ref 4.1–5.7)
RBC #/AREA URNS HPF: ABNORMAL /HPF (ref 0–5)
SAMPLES BEING HELD,HOLD: NORMAL
SODIUM SERPL-SCNC: 140 MMOL/L (ref 136–145)
SP GR UR REFRACTOMETRY: 1.01 (ref 1–1.03)
UR CULT HOLD, URHOLD: NORMAL
UROBILINOGEN UR QL STRIP.AUTO: 0.2 EU/DL (ref 0.2–1)
WBC # BLD AUTO: 10.6 K/UL (ref 4.1–11.1)
WBC URNS QL MICRO: ABNORMAL /HPF (ref 0–4)

## 2022-02-13 PROCEDURE — 80053 COMPREHEN METABOLIC PANEL: CPT

## 2022-02-13 PROCEDURE — 83690 ASSAY OF LIPASE: CPT

## 2022-02-13 PROCEDURE — 74177 CT ABD & PELVIS W/CONTRAST: CPT

## 2022-02-13 PROCEDURE — 96374 THER/PROPH/DIAG INJ IV PUSH: CPT

## 2022-02-13 PROCEDURE — 76705 ECHO EXAM OF ABDOMEN: CPT

## 2022-02-13 PROCEDURE — 85025 COMPLETE CBC W/AUTO DIFF WBC: CPT

## 2022-02-13 PROCEDURE — 74011250636 HC RX REV CODE- 250/636: Performed by: EMERGENCY MEDICINE

## 2022-02-13 PROCEDURE — 36415 COLL VENOUS BLD VENIPUNCTURE: CPT

## 2022-02-13 PROCEDURE — 99282 EMERGENCY DEPT VISIT SF MDM: CPT

## 2022-02-13 PROCEDURE — 81001 URINALYSIS AUTO W/SCOPE: CPT

## 2022-02-13 RX ORDER — MORPHINE SULFATE 4 MG/ML
4 INJECTION INTRAVENOUS ONCE
Status: COMPLETED | OUTPATIENT
Start: 2022-02-13 | End: 2022-02-13

## 2022-02-13 RX ADMIN — MORPHINE SULFATE 4 MG: 4 INJECTION INTRAVENOUS at 23:22

## 2022-02-14 ENCOUNTER — TELEPHONE (OUTPATIENT)
Dept: ONCOLOGY | Age: 47
End: 2022-02-14

## 2022-02-14 PROCEDURE — 74011000636 HC RX REV CODE- 636: Performed by: RADIOLOGY

## 2022-02-14 RX ORDER — ONDANSETRON 8 MG/1
8 TABLET, ORALLY DISINTEGRATING ORAL
Qty: 12 TABLET | Refills: 0 | Status: SHIPPED | OUTPATIENT
Start: 2022-02-14 | End: 2022-04-15

## 2022-02-14 RX ADMIN — IOPAMIDOL 100 ML: 755 INJECTION, SOLUTION INTRAVENOUS at 00:13

## 2022-02-14 NOTE — ED TRIAGE NOTES
Pt arrives to the ER for complaints right sided abdominal pain that started about 2 days ago, pt reports pain getting worse over the last two days. Reports also having nausea and vomiting. Denies diarrhea, shortness of breath or chest pain.      PMH of heart stent and HTN

## 2022-02-14 NOTE — ED PROVIDER NOTES
Abdominal Pain   This is a new problem. The current episode started 2 days ago. The problem has been gradually worsening. The pain is associated with vomiting. The pain is located in the RUQ. The pain is at a severity of 8/10. Associated symptoms include nausea and vomiting. Pertinent negatives include no diarrhea, no melena, no constipation, no dysuria and no frequency. Nothing worsens the pain. Past medical history comments: History of lymphoma, CAD, status post cardiac arrest. The patient's surgical history non-contributory.        Past Medical History:   Diagnosis Date    Anxiety     Cancer [facility])     lymphoma Nov 2018 receiving chemo    Chronic pain     lower back- lymphoma    Hyperlipidemia     Hypertension     Lymphadenopathy 11/12/2018       Past Surgical History:   Procedure Laterality Date    HX HEART CATHETERIZATION  02/2019    HX OTHER SURGICAL  02/2019    cardiac stent    IR INJECTION PSEUDOANEURYSM  2/26/2019         Family History:   Problem Relation Age of Onset    Hypertension Father     Diabetes Father     Diabetes Mother        Social History     Socioeconomic History    Marital status:      Spouse name: Not on file    Number of children: 2    Years of education: 11    Highest education level: 11th grade   Occupational History     Comment: resturant manager,   Tobacco Use    Smoking status: Current Every Day Smoker     Packs/day: 0.50     Years: 20.00     Pack years: 10.00    Smokeless tobacco: Never Used   Vaping Use    Vaping Use: Never used   Substance and Sexual Activity    Alcohol use: No    Drug use: No    Sexual activity: Yes   Other Topics Concern     Service No    Blood Transfusions No    Caffeine Concern Yes    Occupational Exposure No    Hobby Hazards No    Sleep Concern No    Stress Concern No    Weight Concern No    Special Diet No    Back Care No    Exercise No    Bike Helmet No    Seat Belt No    Self-Exams No   Social History Narrative    Not on file     Social Determinants of Health     Financial Resource Strain:     Difficulty of Paying Living Expenses: Not on file   Food Insecurity:     Worried About Running Out of Food in the Last Year: Not on file    [place] of Food in the Last Year: Not on file   Transportation Needs:     Lack of Transportation (Medical): Not on file    Lack of Transportation (Non-Medical): Not on file   Physical Activity:     Days of Exercise per Week: Not on file    Minutes of Exercise per Session: Not on file   Stress:     Feeling of Stress : Not on file   Social Connections:     Frequency of Communication with Friends and Family: Not on file    Frequency of Social Gatherings with Friends and Family: Not on file    Attends Sabianism Services: Not on file    Active Member of [address] [place] [address] [employer] or Organizations: Not on file    Attends Club or Organization Meetings: Not on file    Marital Status: Not on file   Intimate Partner Violence:     Fear of Current or Ex-Partner: Not on file    Emotionally Abused: Not on file    Physically Abused: Not on file    Sexually Abused: Not on file   Housing Stability:     Unable to Pay for Housing in the Last Year: Not on file    Number of [place] in the Last Year: Not on file    Unstable Housing in the Last Year: Not on file         ALLERGIES: Patient has no known allergies. Review of Systems   Gastrointestinal: Positive for abdominal pain, nausea and vomiting. Negative for constipation, diarrhea and melena. Genitourinary: Negative for dysuria and frequency. All other systems reviewed and are negative. Vitals:    02/13/22 2204   BP: (!) 173/96   Pulse: 89   Resp: 18   Temp: 97.7 °F (36.5 °C)   SpO2: 100%   Weight: 68 kg (150 lb)   Height: 5' 7\" (1.702 m)            Physical Exam  Vitals and nursing note reviewed. Constitutional:       General: He is not in acute distress. HENT:      Head: Normocephalic and atraumatic.    Eyes:      General: No scleral icterus. Conjunctiva/sclera: Conjunctivae normal.      Pupils: Pupils are equal, round, and reactive to light. Neck:      Trachea: No tracheal deviation. Cardiovascular:      Rate and Rhythm: Normal rate and regular rhythm. Pulmonary:      Effort: Pulmonary effort is normal. No respiratory distress. Breath sounds: Normal breath sounds. No stridor. Abdominal:      General: There is no distension. Palpations: Abdomen is soft. Tenderness: There is abdominal tenderness (Right upper quadrant). There is no guarding or rebound. Comments: Positive Meyer sign   Genitourinary:     Comments: deferred  Musculoskeletal:         General: No deformity. Cervical back: No rigidity. Skin:     General: Skin is warm and dry. Neurological:      General: No focal deficit present. Mental Status: He is alert. Psychiatric:         Mood and Affect: Mood normal.         Behavior: Behavior normal.          MDM  Number of Diagnoses or Management Options  Diagnosis management comments: 51-year-old male with a history of lymphoma in remission, prior cardiac arrest presents with right upper quadrant pain for the past 2 days has been worsening. Differential diagnosis includes cholecystitis, biliary colic, nonspecific abdominal pain. He is not tachycardic or hypoxic to suggest PE. Pain is not pleuritic. He is not short of breath, has no nausea or diaphoresis. I doubt ACS. ED Course as of 02/14/22 0210   Mon Feb 14, 2022   0106 CT scan shows annular mass suggestive of neoplasm versus lymphoma. Discussed with patient. He has a history of lymphoma. Offered admission for pain control and further work-up. Versus follow-up with his oncologist.  Megan Jacinto for outpatient management. He states he has oxycodone at home for pain control. I wrote for more Zofran. He will follow up with Dr. Yanna Huerta. I gave him return precautions.  [TT]      ED Course User Index  [TT] Chris Velasquez MD Procedures

## 2022-02-14 NOTE — TELEPHONE ENCOUNTER
[address] at [facility]  [phone]        02/14/22 12:47 PM Attempted to call patient via contact number provided. No answer, left message offering appointment. Provided office phone number, requested that patient call back. Will attempt to call patient again later if no return call received. 3:32 PM Attempted to call patient again via contact number provided. No answer. Will attempt to call patient again tomorrow morning if no return call received. 02/15/22 9:17 AM Attempted to call patient via home/mobile number listed. No answer, left additional message requesting a call back--would like to offer patient appointment for today. Provided office phone number as well for call back. Will notify Dr. Maira Velásquez and Brian Palma that this nurse has attempted to call patient x 3 with no call back yet.

## 2022-02-14 NOTE — TELEPHONE ENCOUNTER
Patient called and stated that she was in the ER last night and they found a mass in her stomach. She would like to come in soon. Please advise.     CB# [phone]

## 2022-02-15 ENCOUNTER — OFFICE VISIT (OUTPATIENT)
Dept: ONCOLOGY | Age: 47
End: 2022-02-15
Payer: COMMERCIAL

## 2022-02-15 ENCOUNTER — HOSPITAL ENCOUNTER (OUTPATIENT)
Dept: INFUSION THERAPY | Age: 47
Discharge: HOME | End: 2022-02-15
Payer: COMMERCIAL

## 2022-02-15 VITALS
DIASTOLIC BLOOD PRESSURE: 97 MMHG | HEART RATE: 73 BPM | HEIGHT: 67 IN | RESPIRATION RATE: 18 BRPM | SYSTOLIC BLOOD PRESSURE: 164 MMHG | BODY MASS INDEX: 25.22 KG/M2 | OXYGEN SATURATION: 100 % | TEMPERATURE: 97.8 F | WEIGHT: 160.7 LBS

## 2022-02-15 VITALS
OXYGEN SATURATION: 98 % | RESPIRATION RATE: 16 BRPM | WEIGHT: 161.8 LBS | DIASTOLIC BLOOD PRESSURE: 107 MMHG | SYSTOLIC BLOOD PRESSURE: 167 MMHG | HEART RATE: 83 BPM | HEIGHT: 67 IN | BODY MASS INDEX: 25.39 KG/M2

## 2022-02-15 DIAGNOSIS — C82.90 FOLLICULAR LYMPHOMA, UNSPECIFIED FOLLICULAR LYMPHOMA TYPE, UNSPECIFIED BODY REGION (HCC): Primary | ICD-10-CM

## 2022-02-15 DIAGNOSIS — R11.0 NAUSEA: ICD-10-CM

## 2022-02-15 DIAGNOSIS — Z85.72 HISTORY OF FOLLICULAR LYMPHOMA: ICD-10-CM

## 2022-02-15 DIAGNOSIS — K63.89 COLONIC MASS: Primary | ICD-10-CM

## 2022-02-15 DIAGNOSIS — D64.9 NORMOCYTIC ANEMIA: ICD-10-CM

## 2022-02-15 DIAGNOSIS — R10.11 RUQ ABDOMINAL PAIN: ICD-10-CM

## 2022-02-15 LAB
CEA SERPL-MCNC: 10.2 NG/ML
FERRITIN SERPL-MCNC: 16 NG/ML (ref 26–388)
IRON SATN MFR SERPL: 5 % (ref 20–50)
IRON SERPL-MCNC: 17 UG/DL (ref 35–150)
LDH SERPL L TO P-CCNC: 188 U/L (ref 85–241)
TIBC SERPL-MCNC: 346 UG/DL (ref 250–450)

## 2022-02-15 PROCEDURE — 82378 CARCINOEMBRYONIC ANTIGEN: CPT

## 2022-02-15 PROCEDURE — 74011000250 HC RX REV CODE- 250: Performed by: NURSE PRACTITIONER

## 2022-02-15 PROCEDURE — 82728 ASSAY OF FERRITIN: CPT

## 2022-02-15 PROCEDURE — 96523 IRRIG DRUG DELIVERY DEVICE: CPT

## 2022-02-15 PROCEDURE — 36415 COLL VENOUS BLD VENIPUNCTURE: CPT

## 2022-02-15 PROCEDURE — 74011250636 HC RX REV CODE- 250/636: Performed by: NURSE PRACTITIONER

## 2022-02-15 PROCEDURE — 83540 ASSAY OF IRON: CPT

## 2022-02-15 PROCEDURE — 83615 LACTATE (LD) (LDH) ENZYME: CPT

## 2022-02-15 PROCEDURE — 99214 OFFICE O/P EST MOD 30 MIN: CPT | Performed by: INTERNAL MEDICINE

## 2022-02-15 RX ORDER — SODIUM CHLORIDE 0.9 % (FLUSH) 0.9 %
5-10 SYRINGE (ML) INJECTION AS NEEDED
Status: DISCONTINUED | OUTPATIENT
Start: 2022-02-15 | End: 2022-02-16 | Stop reason: HOSPADM

## 2022-02-15 RX ORDER — SODIUM CHLORIDE 9 MG/ML
10 INJECTION INTRAMUSCULAR; INTRAVENOUS; SUBCUTANEOUS AS NEEDED
Status: DISCONTINUED | OUTPATIENT
Start: 2022-02-15 | End: 2022-02-16 | Stop reason: HOSPADM

## 2022-02-15 RX ORDER — HEPARIN 100 UNIT/ML
500 SYRINGE INTRAVENOUS AS NEEDED
Status: DISCONTINUED | OUTPATIENT
Start: 2022-02-15 | End: 2022-02-16 | Stop reason: HOSPADM

## 2022-02-15 RX ORDER — DOCUSATE SODIUM 100 MG/1
100 CAPSULE, LIQUID FILLED ORAL 2 TIMES DAILY
Qty: 60 CAPSULE | Refills: 2 | Status: SHIPPED | OUTPATIENT
Start: 2022-02-15 | End: 2022-04-15

## 2022-02-15 RX ADMIN — SODIUM CHLORIDE, PRESERVATIVE FREE 10 ML: 5 INJECTION INTRAVENOUS at 14:56

## 2022-02-15 RX ADMIN — HEPARIN 500 UNITS: 100 SYRINGE at 14:57

## 2022-02-15 NOTE — PROGRESS NOTES
[facility]patient [facility]fusion [facility] Short Visit Progress Note     Patient admitted to [facility] for labs ambulatory in stable condition. Assessment completed. No new concerns voiced. Vital Signs:  Visit Vitals  BP (!) 164/97   Pulse 73   Temp 97.8 °F (36.6 °C)   Resp 18   Ht 5' 7\" (1.702 m)   Wt 72.9 kg (160 lb 11.2 oz)   SpO2 100%   BMI 25.17 kg/m²         Right chest port accessed but unable to achieve positive blood return. Port positive for free flow to gravity with 250 ml NS. Infused 50 ml. Lab Results:  Labs obtained peripherally at left [facility]. Sent but not resulted at this time. Medications:  Medications Administered     0.9% sodium chloride injection 10 mL     Admin Date  02/15/2022 Action  Given Dose  10 mL Route  IntraVENous Administered By  Nohemi Whitaker RN          heparin (porcine) pf 500 Units     Admin Date  02/15/2022 Action  Given Dose  500 Units Route  IntraVENous Administered By  Nohemi Whitaker RN                 Patient tolerated treatment well. Patient discharged from [place] [address] ambulatory in no distress.  Patient  next appointment 3/8/2022 at 1:30    Future Appointments   Date Time Provider Dahlia Epps   3/8/2022  1:30 PM SS INF5 CH4 <1H [facility]   3/8/2022  1:45 PM Shadia Blake NP [facility] BS AMB   3/21/2022 11:30 AM Mine Montalvo MD PCS BS AMB   6/27/2022 10:00 AM Luis A Coon DPM RPP BS AMB

## 2022-02-15 NOTE — PROGRESS NOTES
[id number] [facility] at [facility]  [phone]    Hematology / Oncology Established Visit    Reason for Visit:   Paola Vasquez is a 42 y.o. male who is seen for urgent follow up of colon mass, h/o lymphoma. Hematology Oncology Treatment History:     Diagnosis: Follicular lymphoma    Stage: IV    Pathology:   11/13/18 right inguinal LN excision: Follicular lymphoma, high-grade (grade 3a of 3). Comment   The delaney architecture is entirely effaced by atypical lymphocytic proliferation with prominent nodular growth pattern. The majority of the atypical lymphocytes are small to medium in size and have irregular nuclear outlines and inconspicuous nucleoli, morphologically consistent with centrocytes. Scattered large lymphocytes with prominent nucleoli, consistent with centroblasts are identified (> 15 per high-power field). Focal increase in mitotic figures is noted. Occasional follicular dendritic cells are seen. By immunohistochemistry, the atypical lymphocytes are positive for CD20, PAX5, CD10, BCL6 and BCL2 (weak, focal) and negative for MUM1. CD3, CD5 and CD43 stain numerous background T lymphocytes. Ki-67 reveals a high proliferation index in the neoplastic follicles (overall 85-14%). Clinical history indicates 14 cm retroperitoneal mass with bilateral inguinal lymphadenopathy. In summary, the combined morphologic and phenotypic findings are diagnostic of a high-grade follicular lymphoma (grade 3a of 3). There is no evidence of diffuse large B-cell lymphoma. Flow cytometry analysis:   Monoclonal B-cell population (47 % of all cells) with mild increase in side and forward scatter properties expressing CD19, CD20, CD23 and CD10 with surface lambda light chain restriction. No phenotypically aberrant T-cell population. Flow cytometry was performed at [email or web address]     Prior Treatment:   1. Obinutuzumab-CHOP.  Obinutuzumab: 1000 mg weekly on days 1, 8, 15 for cycle 1, then 1000 mg on day 1 q21 days for cycles 2-6, then monotherapy 1000 mg every 21 days for cycle 7, 8 with Cyclophosphamide 750mg/m2, Doxorubicin 50mg/m2, Vincristine 1.4mg/m2 on day 1 and Prednisone 100mg on Days 1-5, every 21 days for a total of 2 cycles completed late 1/2019. Regimen discontinued due to STEMI. 2. Obinutuzumab + Bendamustine: 1000 mg Obinutuzumab on day 1 + Bendamustine 90mg/m2 on days 1-2 on a 28-day cycle x 4 cycles, completed 5/2019    Current Treatment: Surveillance      Oncologic History:  He states he was in his usual state of health in early November 2018 when he developed low back pain and presented to the ER. The pain was described as constant, radiating to the buttocks, without exacerbating or alleviating factors, associated w/ increased urination, no n/v/d, no dysuria, no fever, no significant weight loss at first, but he did later report 15-lb loss over 1 month due to low appetite. Work-up at outside hospital revealed a retroperitoneal mass seen on CT imaging, and he was transferred to [facility] for further work-up. CT there showed a large retroperitoneal mass encircling the aorta with invasion of the left renal hilum and left adrenal gland. There were bilateral inguinal lymph nodes and moderate left hydronephrosis. He was evaluated while at [facility] and was noted to have palpable nodes in his groin for approximately the past 1 month. No testicular mass, anorexia, weight loss, fevers, night sweats, chest pain, shortness of breath, abdominal pain or nausea. He underwent excisional LN biopsy of right inguinal LN. He was told that he had likely lymphoma. He was advised to follow up as outpatient for PET scan, bone marrow biopsy. However, patient states he was lost to f/u. He never received any calls or appointment details. His aunt urged patient to seek care elsewhere and his PCP recommended [facility].  At our first meeting on 12/13/18, he tells me that he was working full time, feeling well until early Nov 2018. When he developed the left lower back pain, he went to [facility] and [facility]. At time of diagnosis, he had no cardiac disease aside from HTN, hyperlipidemia. However, he did have an NSTEMI after cycle 2 of O-CHOP. Was likely unrelated to chemotherapy, but opted to switch treatment to Obinutuzumab-Bendamustine. He completed treatment in 5/2019 and had a CR based on PET. History of Present Illness:   Mr. Jaime Hogan is a 42 y.o. male seen as urgent follow up for a new colon mass seen on CT scan. Pt was seen in the ER on 2/13/22 for 3 day h/o RUQ abdominal pain as well as constipation and nausea. Now having daily formed BM w/o difficulty. No melena/hematochezia. Requiring daily zofran to manage nausea. Reports mild fatigue. No SOB or ice cravings. Eating and hydrating well and no recent weight-loss. PMHx: Lymphoma in 2018, CAD, HTN, Hyperlipidemia, Anxiety, chronic low back pain  PSurgHx: None aside from cardiac stent placement and port placement  SHx: Smokes 1/2ppd x past 20 yrs. Works part time as a cook. Has 2 children. FHx: Father had leukemia, passed away from pancreatic cancer. Review of Systems: A complete review of systems was obtained, negative except as described above. Physical Exam:     Visit Vitals  BP (!) 167/107   Pulse 83   Resp 16   Ht 5' 7\" (1.702 m)   Wt 161 lb 12.8 oz (73.4 kg)   SpO2 98%   BMI 25.34 kg/m²     ECOG PS: 0  General: Well developed, no acute distress, face mask in place. Eyes: Anicteric sclerae  HENT: Atraumatic, OP clear  Neck: Supple  Lymphatic: No cervical, supraclavicular, axillary  adenopathy  Respiratory: CTAB, normal respiratory effort  CV: Normal rate, regular rhythm, no murmurs, no peripheral edema  GI: Soft, + TTP to RUQ, mild abdominal distention, no masses  MS: Normal gait and station. Digits without clubbing or cyanosis. Skin: No rashes, ecchymoses, or petechiae.  Normal temperature, turgor, and texture. Neuro/Psych: Alert, oriented. Moves all 4 extremities. Appropriate affect, normal judgment/insight. Results:     Lab Results   Component Value Date/Time    WBC 10.6 2022 10:48 PM    HGB 10.5 (L) 2022 10:48 PM    HCT 32.9 (L) 2022 10:48 PM    PLATELET 587  10:48 PM    MCV 87.3 2022 10:48 PM    ABS. NEUTROPHILS 7.4 2022 10:48 PM    Hemoglobin (POC) 15.0 2009 02:13 PM    Hematocrit (POC) 39 2019 01:24 PM     Lab Results   Component Value Date/Time    Sodium 140 2022 10:48 PM    Potassium 3.9 2022 10:48 PM    Chloride 110 (H) 2022 10:48 PM    CO2 27 2022 10:48 PM    Glucose 115 (H) 2022 10:48 PM    BUN 15 2022 10:48 PM    Creatinine 1.19 2022 10:48 PM    GFR est AA >60 2022 10:48 PM    GFR est non-AA >60 2022 10:48 PM    Calcium 9.0 2022 10:48 PM    Sodium (POC) 136 2019 01:24 PM    Potassium (POC) 3.9 2019 01:24 PM    Chloride (POC) 102 2019 01:24 PM    Glucose (POC) 249 (H) 02/15/2019 10:21 PM    BUN (POC) 14 2019 01:24 PM    Creatinine (POC) 0.9 2019 01:24 PM    Calcium, ionized (POC) 1.24 2019 01:24 PM     Lab Results   Component Value Date/Time    Bilirubin, total 0.1 (L) 2022 10:48 PM    ALT (SGPT) 14 2022 10:48 PM    Alk. phosphatase 112 2022 10:48 PM    Protein, total 7.1 2022 10:48 PM    Albumin 3.3 (L) 2022 10:48 PM    Globulin 3.8 2022 10:48 PM     No results found for: IRON, FE, TIBC, IBCT, PSAT, FERR    No results found for: B12LT, FOL, RBCF  Lab Results   Component Value Date/Time    TSH 1.53 2016 04:40 AM     18:     Lab Results   Component Value Date/Time    Hepatitis A, IgM NONREACTIVE 2018 04:53 PM    Hepatitis B surface Ag <0.10 2018 04:53 PM    Hepatitis B core, IgM NONREACTIVE 2018 04:53 PM         Imagin/9/18 Abd/pelvis CT: IMPRESSION:  1.  Interval development of a large retroperitoneal mass encircling the aorta with invasion of the left renal hilum and left adrenal gland. Several adjacent lymph nodes are seen extending into the peritoneum and underneath the  diaphragmatic natalie. This most likely represents lymphoma. 2. Several new bilateral enlarged inguinal lymph nodes also likely representing lymphoma. 3. Moderate left hydronephrosis with a delayed renal nephrogram related to decreased renal function. This is related to the invasion of the renal hilum. 11/11/18 Chest CT: IMPRESSION:  Trace left pleural effusion. Bilateral lower lobe atelectasis. Large  retroperitoneal mass lesion again demonstrated. PET 12/18/18:  FINDINGS:  HEAD/NECK: Right palatine tonsil intense hypermetabolism, max SUV 18. Multilevel  bilateral cervical adenopathy, with max SUV 12 in a left supraclavicular node. Cerebral evaluation is limited by normal intense activity. CHEST: Solitary hypermetabolic left axillary node, max SUV 11. ABDOMEN/PELVIS: Bulky retroperitoneal mass max SUV 27, with several additional  small active abdomino-pelvic nodes. Bilateral inguinal nodes with max SUV 12 on  the left. SKELETON: No foci of abnormal hypermetabolism in the axial and visualized  appendicular skeleton. IMPRESSION:   1. Right palatine tonsil tumor involvement (Deauville 5). 2. Bilateral cervical delaney involvement (Deauville 5). 3. Left axillary node involvement (Deauville 5). 4. Bulky retroperitoneal lymphoma mass and additional smaller hypermetabolic  abdomino-pelvic nodes (Deauville 5). 5. Bilateral inguinal delaney involvement (Deauville 5). Deauville Five Point Scale  1. No uptake or no residual uptake (when used interim)  2. Slight uptake, but below blood pool (mediastinum)  3. Uptake above mediastinal, but below/equal to uptake in the liver  4. Uptake slightly to moderately higher than liver  5.  Markedly increased uptake or any new lesion (on response evaluation)  Each FDG-avid (or previously FDG avid) lesion is rated independently. Reference values:  Mediastinal blood pool: 2.1 SUV  Liver (background): 2.2 SUV    PET/CT 2/05/19:   IMPRESSION:  1. No Foci of Abnormal Hypermetabolism (Deauville 1). 2. Resolved activity in the right palatine tonsil, bilateral cervical nodes,left axillary node, retroperitoneal/abdominal pelvic adenopathy, bilateral inguinal nodes. Echo 2/14/19:  Normal cavity size, wall thickness and systolic function (ejection fraction normal). The muscle mass is normal. The cavity shape is normal. The estimated ejection fraction is 41 - 45%. Abnormal wall motion as described on the wall scoring diagram below. End-systolic volume is normal. Normal left ventricular strain. There is mild (grade 1) left ventricular diastolic dysfunction. Normal left ventricular diastolic pressure. End-diastolic volume is normal.    LE arterial duplex 2/22/19:  There is evidence of left groin pseudoaneurysm noted arising from distal common femoral artery, pseudo lobe measures 2.32cm x 2.58cm and pseudo neck length measuring 0.63cm. There is no evidence of hemodynamically significant left lower extremity arterial obstruction. JACLYN is 1.03 on the right and 1.02 on the left. LE arterial duplex s/p Thrombin Injection to Pseudoaneurysm 2/26/19:  Successful thrombin injection procedure of the left groin with no further flow seen. No evidence of hemodnyamically significant obstruction in the left lower extremity. Left lower extremity arterial duplex performed. Confirmed pseudoaneurysm in left groin with small neck. Following thrombin injection, no further flow seen in the pseudoaneurysm. The left common femoral, profunda femoral, femoral, popliteal, posterior tibial and anterior arteries were imaged. Mainly triphasic flow was seen with no evidence of significantly elevated velocities.     Repeat LE arterial duplex 2/27/19:  Continued thrombosed left groin pseudoaneurysm following thrombin injection on 02/26/2019. No flow or color fill is identified. The hematoma measures approximately 2.1 x 2.9 cm in diameter. The common femoral, deep femoral, femoral, and popliteal arteries are patent with mainly tri-phasic flow and no significant hemodynamically obstruction is noted. Stress 5/31/19:  · Normal stress myocardial perfusion without ischemia or infact at 84% MPHR. Normal LV function. LVEF 60%. · No EKG changes of ischemia at peak exercise. · Normal functional capacity. PET 6/03/19: IMPRESSION: No Foci of Abnormal Hypermetabolism (Deauville 1). -MRI lumbar spine 12/18/19:  Mild disc degenerative change at L3-4 and L4-5. Mild canal stenosis at L3-4 and mild left foraminal stenosis at L4-5. Other less severe degenerative findings are as described above. Continued diminished size of retroperitoneal mass-adenopathy,  with diminished soft tissue density at the left renal hilum. -CT chest/abdomen 12/16/19:  Findings are consistent with interval response to therapy    CT c/a/p 5/28/20: IMPRESSION:  Further contraction of the retroperitoneal mantle and chris mesentery,  compatible with treatment response  Stable left hilar soft tissue mass  Slight increased splaying of the duodenum and proximal jejunum is the result, without obstruction  No evidence for recurrence of lymphoma in the chest, abdomen, or pelvis    CT c/a/p 2/13/22:  FINDINGS:   LOWER THORAX: Dependent atelectasis with otherwise clear lungs. The visualized  heart is normal in size without pericardial effusion. LIVER: No mass. BILIARY TREE: Gallbladder is unremarkable. CBD is not dilated. SPLEEN: Unremarkable. PANCREAS: No mass or ductal dilatation. ADRENALS: Unremarkable. KIDNEYS: Atrophic left kidney with mild left hydronephrosis. Right kidney is  unremarkable with no stone, enhancing mass, or other renal abnormality. STOMACH: Unremarkable. SMALL BOWEL: No dilatation or wall thickening.   COLON: Circumferential wall thickening at the hepatic flexure with luminal  narrowing, adjacent mesenteric stranding, and fluid with upstream retention in  the cecum and fecalization of distal ileal contents. No dilation or other wall  thickening. APPENDIX: Unremarkable. PERITONEUM: Moderate free fluid with no pneumoperitoneum. RETROPERITONEUM: Soft tissue stranding around the celiac and SMA and associated aorta with no discrete mass or adenopathy. Aorta is normal in size without aneurysm or dissection. REPRODUCTIVE ORGANS: Prostate and seminal vesicles appear unremarkable. URINARY BLADDER: No mass or calculus. BONES: No destructive bone lesion. ABDOMINAL WALL: No mass or hernia. ADDITIONAL COMMENTS: N/A  IMPRESSION  1. Annular mass lesion of the right colon with upstream fecal retention  concerning for primary colon neoplasm versus less likely lymphoma. 2. Soft tissue stranding around celiac axis, SMA, and abdominal aorta may  reflect infiltrative lymphoma. 3. Left renal atrophy with left hydronephrosis likely reflecting chronic  proximal ureteral obstruction. 4. Incidentals as above.       Assessment & Plan:   Lucinda Mazariegos is a 42 y.o. male comes in for evaluation and management of lymphoma. 1. R Colon mass / normocytic anemia / nausea / RUQ abdominal pain:   Discussed possible colon cancer with patient. Pt needs colonoscopy ASAP. Advised pt to continue soft diet and start stool softeners bid to avoid constipation or obstruction. > 3 gram drop in Hgb in the past 5 months concerning for occult GI blood loss. -- Urgent referral placed to GI for colonoscopy and endoscopy. -- Soft diet and docusate bid  -- Iron profile, ferritin, CEA today, along with port flush - call patient with results. -- Continue zofran prn nausea. -- f/u in 3 weeks    2. H/o Follicular lymphoma: Grade 3a.  Although grade 3a disease is considered more indolent and can be treated like grade 1/2 disease, this patient has indications for treatment: bulky disease encircling the aorta causing symptoms. Bone marrow negative for lymphoma, but was hypercellular. BR better than RCHOP, but based on GALLIUM study, Obinutuzumab-based induction and maintenance prolongs PFS over that seen with rituximab-based therapy. Therefore, I have chosen to treat patient with O-CHOP regimen for 6 cycles, followed by possible maintenance Obinutuzumab. We discussed the risks and benefits of O-CHOP chemotherapy. The potential side effects include, but are not limited to: nausea, vomiting, diarrhea, constipation, Flu-like symptoms, infusion reaction, allergic reaction, flushing, taste changes, increased risk of infection, anemia, fatigue, alopecia, neuropathy, nail changes, cardiac damage, mucositis, myleosuppression, infertility and rarely, death. Patient completed chemoteaching and received a chemotherapy education folder. CR after 2 cycles of O-CHOP, and O-Bendamustine due to cardiac event. Completed treatment 5/2019. PET completed 6/3/19 showed CR. CT 5/28/20 negative for disease. -- Surveillance now that pt is 2 yrs out from diagnosis and treatment will be H&P with labs q6mo. Imaging as clinically indicated. -- Put port removal on hold. 3. Chronic lumbar back pain/anxiety: Left lower back pain is chronic and stable currently on Oxycodone and Lexapro daily. Signed pain contract on 12/28/18 and following with Dr. Arcadio López. 4. CAD: h/o STEMI 2/14/29 with TOM placed to proximal LAD. Following with Dr. Royal Woodward and remains on dual antiplatelet therapy, high dose Lipitor, Metoprolol, ACEI. Received only 2 doses of cardiotoxic chemo, Doxorubicin in early 1/2019. Is overdue for follow up with Dr. Royal Woodward. 5. Tobacco abuse: Discussed benefits of quitting smoking again. Previously discussed replacing hand-to-mouth habit with another item such as Twizzlers or SlimJims. 6. HTN: Normally well controlled on lisinopril. Likely elevated due to anxiety.  No CP, SOB. I personally saw and evaluated the patient and performed the key components of medical decision making. The history, physical exam, and documentation were performed by Rell Bearden NP. I reviewed and verified the above documentation and modified it as needed. New colon mass is concerning for a primary colon cancer. Discussed that this is what caused his constipation and R-sided pain. Needs colonoscopy urgently.     Signed By: Jayashree Quezada MD     February 15, 2022

## 2022-02-15 NOTE — PROGRESS NOTES
Chief Complaint   Patient presents with   Aleida Jane is a pleasant 42year old male who presents as a follow up.  He reports stomach pain

## 2022-02-16 PROBLEM — D50.0 IRON DEFICIENCY ANEMIA DUE TO CHRONIC BLOOD LOSS: Status: ACTIVE | Noted: 2022-02-16

## 2022-02-16 RX ORDER — SODIUM CHLORIDE 0.9 % (FLUSH) 0.9 %
10 SYRINGE (ML) INJECTION AS NEEDED
Status: CANCELLED | OUTPATIENT
Start: 2022-02-24

## 2022-02-16 RX ORDER — ALBUTEROL SULFATE 0.83 MG/ML
2.5 SOLUTION RESPIRATORY (INHALATION) AS NEEDED
Status: CANCELLED
Start: 2022-02-24

## 2022-02-16 RX ORDER — SODIUM CHLORIDE 0.9 % (FLUSH) 0.9 %
10 SYRINGE (ML) INJECTION AS NEEDED
Status: CANCELLED | OUTPATIENT
Start: 2022-03-25

## 2022-02-16 RX ORDER — DIPHENHYDRAMINE HYDROCHLORIDE 50 MG/ML
50 INJECTION, SOLUTION INTRAMUSCULAR; INTRAVENOUS AS NEEDED
Status: CANCELLED
Start: 2022-03-15

## 2022-02-16 RX ORDER — SODIUM CHLORIDE 9 MG/ML
10 INJECTION INTRAMUSCULAR; INTRAVENOUS; SUBCUTANEOUS AS NEEDED
Status: CANCELLED | OUTPATIENT
Start: 2022-03-03

## 2022-02-16 RX ORDER — ONDANSETRON 2 MG/ML
8 INJECTION INTRAMUSCULAR; INTRAVENOUS AS NEEDED
Status: CANCELLED | OUTPATIENT
Start: 2022-02-24

## 2022-02-16 RX ORDER — ALBUTEROL SULFATE 0.83 MG/ML
2.5 SOLUTION RESPIRATORY (INHALATION) AS NEEDED
Status: CANCELLED
Start: 2022-03-17

## 2022-02-16 RX ORDER — SODIUM CHLORIDE 0.9 % (FLUSH) 0.9 %
10 SYRINGE (ML) INJECTION AS NEEDED
Status: CANCELLED | OUTPATIENT
Start: 2022-03-23

## 2022-02-16 RX ORDER — HEPARIN 100 UNIT/ML
300-500 SYRINGE INTRAVENOUS AS NEEDED
Status: CANCELLED
Start: 2022-03-25

## 2022-02-16 RX ORDER — SODIUM CHLORIDE 9 MG/ML
10 INJECTION INTRAMUSCULAR; INTRAVENOUS; SUBCUTANEOUS AS NEEDED
Status: CANCELLED | OUTPATIENT
Start: 2022-03-21

## 2022-02-16 RX ORDER — HEPARIN 100 UNIT/ML
300-500 SYRINGE INTRAVENOUS AS NEEDED
Status: CANCELLED
Start: 2022-02-24

## 2022-02-16 RX ORDER — ONDANSETRON 2 MG/ML
8 INJECTION INTRAMUSCULAR; INTRAVENOUS AS NEEDED
Status: CANCELLED | OUTPATIENT
Start: 2022-03-21

## 2022-02-16 RX ORDER — DIPHENHYDRAMINE HYDROCHLORIDE 50 MG/ML
25 INJECTION, SOLUTION INTRAMUSCULAR; INTRAVENOUS AS NEEDED
Status: CANCELLED
Start: 2022-03-21

## 2022-02-16 RX ORDER — HYDROCORTISONE SODIUM SUCCINATE 100 MG/2ML
100 INJECTION, POWDER, FOR SOLUTION INTRAMUSCULAR; INTRAVENOUS AS NEEDED
Status: CANCELLED | OUTPATIENT
Start: 2022-03-17

## 2022-02-16 RX ORDER — DIPHENHYDRAMINE HYDROCHLORIDE 50 MG/ML
25 INJECTION, SOLUTION INTRAMUSCULAR; INTRAVENOUS AS NEEDED
Status: CANCELLED
Start: 2022-03-03

## 2022-02-16 RX ORDER — ONDANSETRON 2 MG/ML
8 INJECTION INTRAMUSCULAR; INTRAVENOUS AS NEEDED
Status: CANCELLED | OUTPATIENT
Start: 2022-03-25

## 2022-02-16 RX ORDER — HYDROCORTISONE SODIUM SUCCINATE 100 MG/2ML
100 INJECTION, POWDER, FOR SOLUTION INTRAMUSCULAR; INTRAVENOUS AS NEEDED
Status: CANCELLED | OUTPATIENT
Start: 2022-03-21

## 2022-02-16 RX ORDER — EPINEPHRINE 1 MG/ML
0.3 INJECTION, SOLUTION, CONCENTRATE INTRAVENOUS AS NEEDED
Status: CANCELLED | OUTPATIENT
Start: 2022-03-21

## 2022-02-16 RX ORDER — ACETAMINOPHEN 325 MG/1
650 TABLET ORAL AS NEEDED
Status: CANCELLED
Start: 2022-03-21

## 2022-02-16 RX ORDER — ALBUTEROL SULFATE 0.83 MG/ML
2.5 SOLUTION RESPIRATORY (INHALATION) AS NEEDED
Status: CANCELLED
Start: 2022-03-15

## 2022-02-16 RX ORDER — HEPARIN 100 UNIT/ML
300-500 SYRINGE INTRAVENOUS AS NEEDED
Status: CANCELLED
Start: 2022-03-03

## 2022-02-16 RX ORDER — ONDANSETRON 2 MG/ML
8 INJECTION INTRAMUSCULAR; INTRAVENOUS AS NEEDED
Status: CANCELLED | OUTPATIENT
Start: 2022-03-23

## 2022-02-16 RX ORDER — SODIUM CHLORIDE 9 MG/ML
10 INJECTION INTRAMUSCULAR; INTRAVENOUS; SUBCUTANEOUS AS NEEDED
Status: CANCELLED | OUTPATIENT
Start: 2022-02-24

## 2022-02-16 RX ORDER — DIPHENHYDRAMINE HYDROCHLORIDE 50 MG/ML
50 INJECTION, SOLUTION INTRAMUSCULAR; INTRAVENOUS AS NEEDED
Status: CANCELLED
Start: 2022-03-17

## 2022-02-16 RX ORDER — DIPHENHYDRAMINE HYDROCHLORIDE 50 MG/ML
50 INJECTION, SOLUTION INTRAMUSCULAR; INTRAVENOUS AS NEEDED
Status: CANCELLED
Start: 2022-03-23

## 2022-02-16 RX ORDER — HYDROCORTISONE SODIUM SUCCINATE 100 MG/2ML
100 INJECTION, POWDER, FOR SOLUTION INTRAMUSCULAR; INTRAVENOUS AS NEEDED
Status: CANCELLED | OUTPATIENT
Start: 2022-02-24

## 2022-02-16 RX ORDER — ALBUTEROL SULFATE 0.83 MG/ML
2.5 SOLUTION RESPIRATORY (INHALATION) AS NEEDED
Status: CANCELLED
Start: 2022-03-25

## 2022-02-16 RX ORDER — HYDROCORTISONE SODIUM SUCCINATE 100 MG/2ML
100 INJECTION, POWDER, FOR SOLUTION INTRAMUSCULAR; INTRAVENOUS AS NEEDED
Status: CANCELLED | OUTPATIENT
Start: 2022-03-03

## 2022-02-16 RX ORDER — DIPHENHYDRAMINE HYDROCHLORIDE 50 MG/ML
25 INJECTION, SOLUTION INTRAMUSCULAR; INTRAVENOUS AS NEEDED
Status: CANCELLED
Start: 2022-03-25

## 2022-02-16 RX ORDER — DIPHENHYDRAMINE HYDROCHLORIDE 50 MG/ML
50 INJECTION, SOLUTION INTRAMUSCULAR; INTRAVENOUS AS NEEDED
Status: CANCELLED
Start: 2022-03-25

## 2022-02-16 RX ORDER — DIPHENHYDRAMINE HYDROCHLORIDE 50 MG/ML
25 INJECTION, SOLUTION INTRAMUSCULAR; INTRAVENOUS AS NEEDED
Status: CANCELLED
Start: 2022-03-15

## 2022-02-16 RX ORDER — DIPHENHYDRAMINE HYDROCHLORIDE 50 MG/ML
50 INJECTION, SOLUTION INTRAMUSCULAR; INTRAVENOUS AS NEEDED
Status: CANCELLED
Start: 2022-03-03

## 2022-02-16 RX ORDER — HYDROCORTISONE SODIUM SUCCINATE 100 MG/2ML
100 INJECTION, POWDER, FOR SOLUTION INTRAMUSCULAR; INTRAVENOUS AS NEEDED
Status: CANCELLED | OUTPATIENT
Start: 2022-03-23

## 2022-02-16 RX ORDER — EPINEPHRINE 1 MG/ML
0.3 INJECTION, SOLUTION, CONCENTRATE INTRAVENOUS AS NEEDED
Status: CANCELLED | OUTPATIENT
Start: 2022-03-23

## 2022-02-16 RX ORDER — SODIUM CHLORIDE 0.9 % (FLUSH) 0.9 %
10 SYRINGE (ML) INJECTION AS NEEDED
Status: CANCELLED | OUTPATIENT
Start: 2022-03-03

## 2022-02-16 RX ORDER — HYDROCORTISONE SODIUM SUCCINATE 100 MG/2ML
100 INJECTION, POWDER, FOR SOLUTION INTRAMUSCULAR; INTRAVENOUS AS NEEDED
Status: CANCELLED | OUTPATIENT
Start: 2022-03-15

## 2022-02-16 RX ORDER — DIPHENHYDRAMINE HYDROCHLORIDE 50 MG/ML
25 INJECTION, SOLUTION INTRAMUSCULAR; INTRAVENOUS AS NEEDED
Status: CANCELLED
Start: 2022-03-23

## 2022-02-16 RX ORDER — EPINEPHRINE 1 MG/ML
0.3 INJECTION, SOLUTION, CONCENTRATE INTRAVENOUS AS NEEDED
Status: CANCELLED | OUTPATIENT
Start: 2022-03-25

## 2022-02-16 RX ORDER — EPINEPHRINE 1 MG/ML
0.3 INJECTION, SOLUTION, CONCENTRATE INTRAVENOUS AS NEEDED
Status: CANCELLED | OUTPATIENT
Start: 2022-03-15

## 2022-02-16 RX ORDER — EPINEPHRINE 1 MG/ML
0.3 INJECTION, SOLUTION, CONCENTRATE INTRAVENOUS AS NEEDED
Status: CANCELLED | OUTPATIENT
Start: 2022-03-17

## 2022-02-16 RX ORDER — SODIUM CHLORIDE 9 MG/ML
10 INJECTION INTRAMUSCULAR; INTRAVENOUS; SUBCUTANEOUS AS NEEDED
Status: CANCELLED | OUTPATIENT
Start: 2022-03-25

## 2022-02-16 RX ORDER — EPINEPHRINE 1 MG/ML
0.3 INJECTION, SOLUTION, CONCENTRATE INTRAVENOUS AS NEEDED
Status: CANCELLED | OUTPATIENT
Start: 2022-03-03

## 2022-02-16 RX ORDER — SODIUM CHLORIDE 0.9 % (FLUSH) 0.9 %
10 SYRINGE (ML) INJECTION AS NEEDED
Status: CANCELLED | OUTPATIENT
Start: 2022-03-21

## 2022-02-16 RX ORDER — SODIUM CHLORIDE 9 MG/ML
25 INJECTION, SOLUTION INTRAVENOUS CONTINUOUS
Status: CANCELLED | OUTPATIENT
Start: 2022-02-24

## 2022-02-16 RX ORDER — ACETAMINOPHEN 325 MG/1
650 TABLET ORAL AS NEEDED
Status: CANCELLED
Start: 2022-03-23

## 2022-02-16 RX ORDER — ACETAMINOPHEN 325 MG/1
650 TABLET ORAL AS NEEDED
Status: CANCELLED
Start: 2022-02-24

## 2022-02-16 RX ORDER — DIPHENHYDRAMINE HYDROCHLORIDE 50 MG/ML
50 INJECTION, SOLUTION INTRAMUSCULAR; INTRAVENOUS AS NEEDED
Status: CANCELLED
Start: 2022-03-21

## 2022-02-16 RX ORDER — HEPARIN 100 UNIT/ML
300-500 SYRINGE INTRAVENOUS AS NEEDED
Status: CANCELLED
Start: 2022-03-23

## 2022-02-16 RX ORDER — ONDANSETRON 2 MG/ML
8 INJECTION INTRAMUSCULAR; INTRAVENOUS AS NEEDED
Status: CANCELLED | OUTPATIENT
Start: 2022-03-03

## 2022-02-16 RX ORDER — HEPARIN 100 UNIT/ML
300-500 SYRINGE INTRAVENOUS AS NEEDED
Status: CANCELLED
Start: 2022-03-21

## 2022-02-16 RX ORDER — SODIUM CHLORIDE 0.9 % (FLUSH) 0.9 %
10 SYRINGE (ML) INJECTION AS NEEDED
Status: CANCELLED | OUTPATIENT
Start: 2022-03-17

## 2022-02-16 RX ORDER — ACETAMINOPHEN 325 MG/1
650 TABLET ORAL AS NEEDED
Status: CANCELLED
Start: 2022-03-03

## 2022-02-16 RX ORDER — ACETAMINOPHEN 325 MG/1
650 TABLET ORAL AS NEEDED
Status: CANCELLED
Start: 2022-03-15

## 2022-02-16 RX ORDER — ACETAMINOPHEN 325 MG/1
650 TABLET ORAL AS NEEDED
Status: CANCELLED
Start: 2022-03-25

## 2022-02-16 RX ORDER — EPINEPHRINE 1 MG/ML
0.3 INJECTION, SOLUTION, CONCENTRATE INTRAVENOUS AS NEEDED
Status: CANCELLED | OUTPATIENT
Start: 2022-02-24

## 2022-02-16 RX ORDER — HEPARIN 100 UNIT/ML
300-500 SYRINGE INTRAVENOUS AS NEEDED
Status: CANCELLED
Start: 2022-03-15

## 2022-02-16 RX ORDER — HYDROCORTISONE SODIUM SUCCINATE 100 MG/2ML
100 INJECTION, POWDER, FOR SOLUTION INTRAMUSCULAR; INTRAVENOUS AS NEEDED
Status: CANCELLED | OUTPATIENT
Start: 2022-03-25

## 2022-02-16 RX ORDER — SODIUM CHLORIDE 9 MG/ML
10 INJECTION INTRAMUSCULAR; INTRAVENOUS; SUBCUTANEOUS AS NEEDED
Status: CANCELLED | OUTPATIENT
Start: 2022-03-17

## 2022-02-16 RX ORDER — ALBUTEROL SULFATE 0.83 MG/ML
2.5 SOLUTION RESPIRATORY (INHALATION) AS NEEDED
Status: CANCELLED
Start: 2022-03-23

## 2022-02-16 RX ORDER — DIPHENHYDRAMINE HYDROCHLORIDE 50 MG/ML
25 INJECTION, SOLUTION INTRAMUSCULAR; INTRAVENOUS AS NEEDED
Status: CANCELLED
Start: 2022-02-24

## 2022-02-16 RX ORDER — DIPHENHYDRAMINE HYDROCHLORIDE 50 MG/ML
50 INJECTION, SOLUTION INTRAMUSCULAR; INTRAVENOUS AS NEEDED
Status: CANCELLED
Start: 2022-02-24

## 2022-02-16 RX ORDER — DIPHENHYDRAMINE HYDROCHLORIDE 50 MG/ML
25 INJECTION, SOLUTION INTRAMUSCULAR; INTRAVENOUS AS NEEDED
Status: CANCELLED
Start: 2022-03-17

## 2022-02-16 RX ORDER — SODIUM CHLORIDE 9 MG/ML
10 INJECTION INTRAMUSCULAR; INTRAVENOUS; SUBCUTANEOUS AS NEEDED
Status: CANCELLED | OUTPATIENT
Start: 2022-03-15

## 2022-02-16 RX ORDER — ALBUTEROL SULFATE 0.83 MG/ML
2.5 SOLUTION RESPIRATORY (INHALATION) AS NEEDED
Status: CANCELLED
Start: 2022-03-21

## 2022-02-16 RX ORDER — ALBUTEROL SULFATE 0.83 MG/ML
2.5 SOLUTION RESPIRATORY (INHALATION) AS NEEDED
Status: CANCELLED
Start: 2022-03-03

## 2022-02-16 RX ORDER — HEPARIN 100 UNIT/ML
300-500 SYRINGE INTRAVENOUS AS NEEDED
Status: CANCELLED
Start: 2022-03-17

## 2022-02-16 RX ORDER — SODIUM CHLORIDE 9 MG/ML
10 INJECTION INTRAMUSCULAR; INTRAVENOUS; SUBCUTANEOUS AS NEEDED
Status: CANCELLED | OUTPATIENT
Start: 2022-03-23

## 2022-02-16 RX ORDER — SODIUM CHLORIDE 0.9 % (FLUSH) 0.9 %
10 SYRINGE (ML) INJECTION AS NEEDED
Status: CANCELLED | OUTPATIENT
Start: 2022-03-15

## 2022-02-16 RX ORDER — ONDANSETRON 2 MG/ML
8 INJECTION INTRAMUSCULAR; INTRAVENOUS AS NEEDED
Status: CANCELLED | OUTPATIENT
Start: 2022-03-15

## 2022-02-16 RX ORDER — ACETAMINOPHEN 325 MG/1
650 TABLET ORAL AS NEEDED
Status: CANCELLED
Start: 2022-03-17

## 2022-02-16 RX ORDER — ONDANSETRON 2 MG/ML
8 INJECTION INTRAMUSCULAR; INTRAVENOUS AS NEEDED
Status: CANCELLED | OUTPATIENT
Start: 2022-03-17

## 2022-02-16 RX ORDER — SODIUM CHLORIDE 9 MG/ML
25 INJECTION, SOLUTION INTRAVENOUS CONTINUOUS
Status: CANCELLED | OUTPATIENT
Start: 2022-03-03

## 2022-02-16 NOTE — PROGRESS NOTES
Spenser, Please let patient know he is iron deficient. I recommend Injectafer x 2. Explain these are iron infusions given a week apart. The infusion is about 30 minutes and the overall appointment would be 1-2 hours. Once you are able to get in touch with him, please transfer to Rahul to schedule him.

## 2022-02-22 ENCOUNTER — APPOINTMENT (OUTPATIENT)
Dept: INFUSION THERAPY | Age: 47
End: 2022-02-22

## 2022-02-23 ENCOUNTER — TELEPHONE (OUTPATIENT)
Dept: ONCOLOGY | Age: 47
End: 2022-02-23

## 2022-02-23 NOTE — TELEPHONE ENCOUNTER
[employer] at [facility]  [phone]        02/23/22 3:04 PM Attempted to place return call via contact number provided. Automated recording indicated number has been changed or disconnected. Attempted to call home/cell number listed as patient's girlfriend mentioned during previous conversation they were sharing phone. No answer, left message requesting return call. Provided office phone number as well.     02/24/22 3:50 PM Called patient's girlfriend, Phuong An, and advised of Dr. Maite Wick response. She voiced understanding and states she will share this with patient when she visits him tomorrow. While on phone, she stated that patient's stomach had become bloated after trial with solid foods. Treatment team placed NGT and plan on leaving tube in place for a couple more days before re-attempting PO intake. Asked that Phuong An please keep office updated. No further questions or concerns at this time.

## 2022-02-23 NOTE — TELEPHONE ENCOUNTER
Yunier Mak called and left  stating she would like a call back to discuss if she can have a call back to see if the patient can tranfer care to the hospital here because he is being treated very unfairly.  Please advise       CB# [phone]

## 2022-02-23 NOTE — TELEPHONE ENCOUNTER
[address] at [facility]  [phone]        02/23/22 9:51 AM Called to check on patient and verify if he had obtained appointment with GI at [facility]. Patient's girlfriend answered and stated patient is currently admitted to [facility]. Patient sought care in ED due to pain and intractable nausea/vomiting. She stated patient had not had a bowel movement for 3 weeks. She estimates patient was admitted about 4 days ago. Patient now eating solids and doing better, but still in a lot of pain. Advised this nurse would update Dr. Michelle Beaver and Jc Hood of above. Will also attempt to request records. Encouraged patient/girlfiend to contact office if any questions or updates. Girlfriend voiced understanding. No further questions or concerns at this time.

## 2022-02-24 ENCOUNTER — APPOINTMENT (OUTPATIENT)
Dept: INFUSION THERAPY | Age: 47
End: 2022-02-24

## 2022-02-25 ENCOUNTER — TELEPHONE (OUTPATIENT)
Dept: ONCOLOGY | Age: 47
End: 2022-02-25

## 2022-02-25 NOTE — TELEPHONE ENCOUNTER
Clare Jain called and stated that patients colonoscopy was done yesterday and Clare Jain would like a call back to discuss who she should she talk to at [address] [place] [address] to start this process of moving him to [address] [place] [address].  Please advise     CB# [phone]

## 2022-02-25 NOTE — TELEPHONE ENCOUNTER
[address] at [facility]  [phone]        02/25/22 11:44 AM Return call placed to [facility]. Advised that conversation regarding request to transfer could be initiated with hospitalist or  for unit. Explained that they will likely have to consult with specialists seeing patient as well. [facility] voiced understanding. No further questions or concerns at this time. `Narrative: This is a 76 year old male with a PMH of HTN, HLD, carotid disease, former smoker, COPD, DM, malignant neoplasm of esophagus, and dyspnea,( moderate level of exertion)  presenting to the Cath holding area this am for a [facility] with Dr ZOHREH Hayes secondary to increasing SOB.    Symptoms:        Angina (Class): 3       Ischemic Symptoms: SOB. DE LA CRUZ    Heart Failure: no       Systolic/Diastolic/Combined:        NYHA Class (within 2 weeks):     Assessment of LVEF (Must be within 6 months):       EF: 65-70%       Assessed by: echo       Date: 9/15/2017    Prior Cardiac Interventions (LHC, stents, CABG):       PCI's (Date, Stents, Vessels):        CABG (Date, Grafts):   Carotid duplex -Right carotid system-mopderate atherosclerosis seen without evidence of hemdynamically significant stenosis  Left Carotid system-mild atherosclerosis seen without evidence of hemodynamically significant stenosis.  Noninvasive Testing:   Stress Test: Date: 1/12/2021-mildly abnormal study. The LV is normal in size. There is a small, moderate defect in apical lateral wall that is reversible, suggestive of mild ischemia.       Protocol:        Duration of Exercise:        Symptoms:        EKG Changes:        DTS:        Myocardial Imaging:        Risk Assessment (Low, Medium, High):   EKG-NSR HR-73 non-specific t-wave changes  Echo (Date, Findings): 9/15/6540-PWBU-67-70%, trace mitral valve regurg, no evidence of pericardial effusion    Antianginal Therapies:        Beta Blockers:  toprol XL       Calcium Channel Blockers:        Long Acting Nitrates:        Ranexa:     Associated Risk Factors:        Cerebrovascular Disease: N/A       Chronic Lung Disease: N/A       Peripheral Arterial Disease: N/A       Chronic Kidney Disease (if yes, what is GFR): N/A       Uncontrolled Diabetes (if yes, what is HgbA1C or FBS): N/A       Poorly Controlled Hypertension (if yes, what is SBP): N/A       Morbid Obesity (if yes, what is BMI): N/A       History of Recent Ventricular Arrhythmia: N/A       Inability to Ambulate Safely: N/A       Need for Therapeutic Anticoagulation: N/A       Antiplatelet or Contrast Allergy: N/A `Narrative: This is a 76 year old male with a PMH of HTN, HLD, carotid disease, former smoker, COPD, DM, malignant neoplasm of esophagus, and dyspnea,( moderate level of exertion)  presenting to the Cath holding area this am for a C with Dr ZOHREH Hayes secondary to increasing SOB.    Symptoms:        Angina (Class): 3       Ischemic Symptoms: SOB. DE LA CRUZ    Heart Failure: no       Systolic/Diastolic/Combined:        NYHA Class (within 2 weeks):     Assessment of LVEF (Must be within 6 months):       EF: 65-70%       Assessed by: echo       Date: 9/15/2017    Prior Cardiac Interventions (LHC, stents, CABG): NONE        Carotid duplex -Right carotid system-moderate atherosclerosis seen without evidence of hemodynamically significant stenosis  Left Carotid system-mild atherosclerosis seen without evidence of hemodynamically significant stenosis.    Noninvasive Testing:   Stress Test: Date: 1/12/2021-mildly abnormal study. The LV is normal in size. There is a small, moderate defect in apical lateral wall that is reversible, suggestive of mild ischemia.       Protocol: Regadenoson       Duration of Exercise: N/A       Symptoms: SOB       EKG Changes: None       DTS: N/A       Myocardial Imaging: very small moderate defect in apical anterior wall that is reversible, suggestive of mild ischemia       Risk Assessment (Low, Medium, High): low    EKG-NSR HR-73 non-specific t-wave changes  Echo (Date, Findings): 9/15/8492-EZTN-05-70%, trace mitral valve regurg, no evidence of pericardial effusion    Antianginal Therapies:        Beta Blockers:  toprol XL          `Narrative: This is a 76 year old male with a PMH of HTN, HLD, carotid disease, cerebral aneurysm s/p coiling, former smoker, COPD, DM, malignant neoplasm of esophagus, and dyspnea,( moderate level of exertion)  presenting to the Cath holding area this am for a [facility] with Dr ZOHREH Hayes secondary to increasing SOB.    Symptoms:        Angina (Class): 3       Ischemic Symptoms: SOB. DE LA CRUZ    Heart Failure: no       Systolic/Diastolic/Combined:        NYHA Class (within 2 weeks):     Assessment of LVEF (Must be within 6 months):       EF: 65-70%       Assessed by: echo       Date: 9/15/2017    Prior Cardiac Interventions (LHC, stents, CABG): NONE        Carotid duplex -Right carotid system-moderate atherosclerosis seen without evidence of hemodynamically significant stenosis  Left Carotid system-mild atherosclerosis seen without evidence of hemodynamically significant stenosis.    Noninvasive Testing:   Stress Test: Date: 1/12/2021-mildly abnormal study. The LV is normal in size. There is a small, moderate defect in apical lateral wall that is reversible, suggestive of mild ischemia.       Protocol: Regadenoson       Duration of Exercise: N/A       Symptoms: SOB       EKG Changes: None       DTS: N/A       Myocardial Imaging: very small moderate defect in apical anterior wall that is reversible, suggestive of mild ischemia       Risk Assessment (Low, Medium, High): low    EKG-NSR HR-73 non-specific t-wave changes  Echo (Date, Findings): 9/15/9672-PKMZ-28-70%, trace mitral valve regurg, no evidence of pericardial effusion    Antianginal Therapies:        Beta Blockers:  toprol XL

## 2022-03-01 ENCOUNTER — TELEPHONE (OUTPATIENT)
Dept: PALLATIVE CARE | Age: 47
End: 2022-03-01

## 2022-03-01 ENCOUNTER — TELEPHONE (OUTPATIENT)
Dept: ONCOLOGY | Age: 47
End: 2022-03-01

## 2022-03-01 ENCOUNTER — APPOINTMENT (OUTPATIENT)
Dept: INFUSION THERAPY | Age: 47
End: 2022-03-01

## 2022-03-01 NOTE — TELEPHONE ENCOUNTER
The patient states a mass has been found in his colon. He was prescribed Oxycodone by [facility] and now he is completely out. He is calling to confirm if Dr. Solis Hannah can call in prescription for Oxycodone. He takes one every 4 hours.   # [id number]

## 2022-03-01 NOTE — TELEPHONE ENCOUNTER
Palliative Medicine  Nursing Note  [phone]  Fax [phone]      Telephone Call  Patient Name: Jessy Mclain. YOB: 1975    3/1/2022        Primary Decision Maker: Mk Laguerre - Ex-Spouse - [phone]   Advance Care Planning 1/4/2022   Patient's Healthcare Decision Maker is: Legal Next of Kin   Confirm Advance Directive None   Patient Would Like to Complete Advance Directive -   Does the patient have other document types -     Returned call to Mr. Rd Smith. He shared he was admitted to [facility] for 9 days from 2/19-2/28/22 due to an ileus, N/V and bloating. He had a colonoscopy which  revealed a mass and probable reason for blockage. Biopsy results are pending. He required a Ileostomy and is recently post -op. He does have home health starting tomorrow to assist with education and management of new colostomy bag and supplies. He states that he was given a 3 day script upon D/C on 2/28/22 from [facility] for Oxycodone 1 tab every 4 hours but the pharmacy is unable to fill it as it is \"too soon. \"  He last filled an Oxycodone prescription on 2/15/22 for #80 for 30 days and  has 2 tabs left. He states that the ED provider on 2/14/22 told him to take 2 tabs every 4 hours prn and why his 30 day script only lasted 6 days. Discussed that insurance will not approve an early Oxycodone at this time and informed to pay out of pocket for the [facility] script. Prabhu shows that 20 tabs would be $10 at [facility].    Shared that Dr. Justine Leal will be made aware and Palliative will call him for any updates or changes. He verbalized understanding. Per [facility] records, patient had ileostomy creation on 2/19/22 d/t large bowel obstruction, ileus noted on 2/24/22 with repeat CT and colonoscopy with GI to evaluate mass at hepatic flexure. D/C 2/28/22.       Triage for Controlled Substance Refill Request    Pain Diagnosis: post surgical ileostomy    Last Outpatient Visit: 1/4/22    Next Outpatient Visit: 3/21/22    Reason for refill needed outside of office visit-   Increased pain    Any Reported Side effects: none    Last dose taken: today    Pharmacy: [facility]    Medication: Oxycodone   Dose and directions: 5mg 1 tab every 4 prn  Number dispensed: 80 for 30 day supply  Date filled ( or Pharmacy): 2/15/22  # left: 2         reviewed: 3/1/22    Date of Urine Drug Screen:      Opioid Safety Handout given:  Yes    Appropriate for refill:  provider to determine    Action:  pended for approval      Kwasi Pastor RN  Palliative Medicine

## 2022-03-01 NOTE — TELEPHONE ENCOUNTER
Patient called and left  requesting a call back from team. Patient stated he just got out of the hospital and would like to discuss his next steps.  Please advise     CB# [phone]

## 2022-03-01 NOTE — TELEPHONE ENCOUNTER
Palliative Medicine  Nursing Note  [phone]  Fax [phone]      Telephone Call  Patient Name: Jennifer Anderson. YOB: 1975    3/1/2022        Primary Decision Maker: [de-identified] - Ex-Spouse - [phone]   Advance Care Planning 1/4/2022   Patient's Healthcare Decision Maker is: Legal Next of Kin   Confirm Advance Directive None   Patient Would Like to Complete Advance Directive -   Does the patient have other document types -       Call returned to Tj Katya Lomeli and left voice message regarding available appt with Dr. Gretel Garza tomorrow at 3:30pm to assess and help manage his increased abdominal pain related to his recent emergent ileostomy surgery at [facility] 2/19/22 due to a large blockage.     Robbie Jenkins RN  Palliative Medicine

## 2022-03-02 ENCOUNTER — TELEPHONE (OUTPATIENT)
Dept: PALLATIVE CARE | Age: 47
End: 2022-03-02

## 2022-03-02 ENCOUNTER — OFFICE VISIT (OUTPATIENT)
Dept: PALLATIVE CARE | Age: 47
End: 2022-03-02
Payer: COMMERCIAL

## 2022-03-02 VITALS
TEMPERATURE: 97.3 F | BODY MASS INDEX: 22.91 KG/M2 | HEIGHT: 67 IN | HEART RATE: 99 BPM | RESPIRATION RATE: 20 BRPM | WEIGHT: 146 LBS | DIASTOLIC BLOOD PRESSURE: 96 MMHG | OXYGEN SATURATION: 98 % | SYSTOLIC BLOOD PRESSURE: 140 MMHG

## 2022-03-02 DIAGNOSIS — G89.29 CHRONIC LEFT-SIDED LOW BACK PAIN WITHOUT SCIATICA: ICD-10-CM

## 2022-03-02 DIAGNOSIS — C18.3 MALIGNANT NEOPLASM OF HEPATIC FLEXURE (HCC): ICD-10-CM

## 2022-03-02 DIAGNOSIS — F41.9 ANXIETY: ICD-10-CM

## 2022-03-02 DIAGNOSIS — C85.90 LYMPHOMA IN REMISSION (HCC): ICD-10-CM

## 2022-03-02 DIAGNOSIS — F32.9 REACTIVE DEPRESSION: ICD-10-CM

## 2022-03-02 DIAGNOSIS — R63.0 POOR APPETITE: ICD-10-CM

## 2022-03-02 DIAGNOSIS — R10.84 ABDOMINAL PAIN, GENERALIZED: Primary | ICD-10-CM

## 2022-03-02 DIAGNOSIS — M54.50 CHRONIC LEFT-SIDED LOW BACK PAIN WITHOUT SCIATICA: ICD-10-CM

## 2022-03-02 PROCEDURE — 99214 OFFICE O/P EST MOD 30 MIN: CPT | Performed by: INTERNAL MEDICINE

## 2022-03-02 RX ORDER — OXYCODONE HYDROCHLORIDE 10 MG/1
10 TABLET ORAL
Qty: 90 TABLET | Refills: 0 | Status: SHIPPED | OUTPATIENT
Start: 2022-03-02 | End: 2022-03-08 | Stop reason: SDUPTHER

## 2022-03-02 NOTE — PROGRESS NOTES
Palliative Medicine Outpatient Services  [place]: [phone] ([phone])    Patient Name: Nic Causey. YOB: 1975    Date of Current Visit: 3/2/22  Location of Current Visit:    [] [facility] Office  [x] [facility] Office  [] [address] [place] [address]  [] Home  [] Other:  televisit    Date of Initial Visit: 2/19/19   Referral from: Dustin Kaba MD  Primary Care Physician: Brian Malik MD      SUMMARY:   Nic Sanchez is a 42y.o. year old with high-grade follicular lymphoma, who was referred to Palliative Medicine by Dr. Addison Armstrong for symptom management and supportive care. He was diagnosed in 11/2018 after presenting with back pain. He is currently receiving systemic chemotherapy. He suffered cardiac arrest during cycle #3 due to previously undiagnosed severe stenosis of the LAD s/p PTCA and stent. He has since completed systemic chemotherapy. Subsequent scans showed ongoing response to therapy. He was diagnosed with colon cancer in 2/14/22 and underwent loop ileostomy at [facility] on 2/19/22    The patients social history includes: he is single. He lives in [place] with his girlfriend, Jack Hinds, and their 15month old son. Olive White He has 3 teenage children who live with their mother and with whom he has close contact. 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 Palliative Medicine is addressing the following current patient/family concerns: abdominal pain related to malignancy; anxiety, depression; left mid- and low back pain, poor appetite, fatigue, advanced care planning. Initial Referral Intake note from Meera Meier RN reviewed prior to visit   PALLIATIVE DIAGNOSES:       ICD-10-CM ICD-9-CM    1. Abdominal pain, generalized  R10.84 789.07 oxyCODONE IR (ROXICODONE) 10 mg tab immediate release tablet   2. Chronic left-sided low back pain without sciatica  M54.50 724.2     G89.29 338.29    3. Anxiety  F41.9 300.00    4. Reactive depression  F32.9 300.4    5.  Poor appetite R63.0 783.0    6. Lymphoma in remission ([facility] [address]  C85.90 202.80    7. Malignant neoplasm of hepatic flexure (HCC)  C18.3 153.0 oxyCODONE IR (ROXICODONE) 10 mg tab immediate release tablet          PLAN:   Patient Instructions     Dear Calista Gilmore. ,    It was a pleasure seeing you today in [facility] [address]. We will see you again in 1 week to coordinate with your return to see Dr. Nuria John. If labs or imaging tests have been ordered for you today, please call the office  at [phone] 48 hours after completion to obtain the results. Your stated goal:     Your described symptoms were: Fatigue: 7 Drowsiness: 5   Depression: 5 Pain: 8   Anxiety: 10 Nausea: 2   Anorexia: 5 Dyspnea: 0   Best Well-Bein Constipation: No   Other Problem (Comment): 0       This is the plan we talked about:    1. Abdominal pain  -Start oxycodone 10-mg every 4 hours as needed  -You may need a long-acting opioid for optimal pain control  -I will see you again in 1 week to assess for this    2. Depression/anxiety  -Continue Lexapro 20-mg daily  -I'm avoiding benzodiazepines due to synergistic effect with opioids  -I will have Deandre Franklin, the  in Dr. El Britton office, reach out to you    3. Poor appetite/weight loss  -We talked about eating multiple, small \"meals\" during the day  -We can refer you to the [id number] if you continue to lose weight     4.  Colon mass  -You are waiting to the pathology results  -You have a follow-up appointment next week with Dr. Nuria John to review this    This is what you have shared with us about Advance Care Planning:      Primary Decision Maker: [de-identified] - Ex-Spouse - [phone]  Advance Care Planning 3/2/2022   Patient's [address] is: Legal Next of [facility] in [place] [address] Directive None   Patient Would Like to Complete Advance Directive -   Does the patient have other document types -           The Palliative Medicine Team is here to support you and your family. Sincerely,      Alf Gomez MD and the Palliative Medicine Team     GOALS OF CARE / TREATMENT PREFERENCES:   [====Goals of Care====]  GOALS OF CARE:  Patient / health care proxy stated goals: See Patient Instructions / Summary    TREATMENT PREFERENCES:   Code Status:  [x] Attempt Resuscitation       [] Do Not Attempt Resuscitation    Advance Care Planning:  [x] The Pall Med Interdisciplinary Team has updated the ACP Navigator with Decision Maker and Patient Capacity      Primary Decision Maker: [de-identified] - Ex-Spouse - [phone]  [] Named in a scanned document   [x] Legal Next of Kin  [] Guardian    Other:  (If patient appropriate for POST, consider using PALLPOST smart phrase here)    The palliative care team has discussed with patient / health care proxy about goals of care / treatment preferences for patient.  [====Goals of Care====]     PRESCRIPTIONS GIVEN:     Medications Ordered Today   Medications    oxyCODONE IR (ROXICODONE) 10 mg tab immediate release tablet     Sig: Take 1 Tablet by mouth every four (4) hours as needed for Pain for up to 15 days. Max Daily Amount: 60 mg. Dispense:  90 Tablet     Refill:  0           FOLLOW UP:     Future Appointments   Date Time Provider Dahlia Epps   3/8/2022  1:30 PM [facility]   3/8/2022  1:45 PM [facility]   3/8/2022  2:00 PM Ramona Rothman MD PCS BS AMB   3/21/2022 11:30 AM Ramona Montalvo MD PCS BS AMB   6/27/2022 10:00 AM Marnie Coon, DPM RPP BS [facility]           PHYSICIANS INVOLVED IN CARE:   Patient Care Team:  Paras Michel MD as PCP - General (Family Medicine)  Curry Hughes MD (Hematology and Oncology)  Alf Gomez MD as Physician (Palliative Medicine)  Alf Gomez MD as Physician (Palliative Medicine)       HISTORY:   Reviewed patient-completed ESAS and advance care planning form.   Reviewed patient record in prescription monitoring program.    CHIEF COMPLAINT:   Chief Complaint   Patient presents with    Abdominal Pain       HPI/SUBJECTIVE:    The patient is: [x] Verbal / [] Nonverbal       He started having abdominal pain about a month ago. Then he started feeling nauseous. He had trouble with bowel movements, feeling like he wasn't completing emptying his bowels. He went to the ED on 2/14, CT scan showed mass in his colon. He was hospitalized at [facility] 2/15-2/25 for colonoscopy and loop ileostomy. He's still having pain in his abdomen. He's been taking 2 oxycodone every 4 to 6 hours which eases the pain to ~5/10 which is tolerable. He's always anxious. He continues to take Lexapro. He's eating, not as much as he used to. He ate 2 PBJs yesterday. He had mild nausea for a few days after he came home. He's passing formed stool in his ostomy bag daily. Home health is coming to his home. He sees Dr. Yuridia Dillard next week. From IV 2/19/19:  He has a lot of anxiety. He's lived with anxiety for a long time, sometimes it's been worse than others, like when he lost his job and lost his insurance. He took Wellbutrin then which made him more anxious and depressed. He's had more anxiety since he was diagnosed with lymphoma. He's been taking lorazepam and it doesn't help at all, even when he tried taking 2 pills. This is his biggest problem now. He feels depressed but it's not as bad as the anxiety.     He has pain in his back, that's what brought him to the hospital in November. He's been taking oxycodone which helps some. The groin pain started after his operation (cardiac cath) in the hospital last week. He expects that will get better as it heals. His pain doesn't bother him too much, not like the anxiety.     His appetite is getting better.  He's lost ~30# since last fall but that's leveled off now.     He's moving his bowels regularly.     He sometimes gets short of breath but not as much as used to.     He doesn't have chest pain, never did.     He sleeps well at night. He doesn't have the energy to do much during the day.     He lives with his father. He sees his three teen-age children frequently (they live with their mother). His children give him a lot of strength. Clinical Pain Assessment (nonverbal scale for nonverbal patients):   [++++ Clinical Pain Assessment++++]  [++++Pain Severity++++]: Pain: 8  [++++Pain Character++++]: stabbing pain in back  [++++Pain Duration++++]: months for back pain, weeks for groin pain  [++++Pain Effect++++]: little  [++++Pain Factors++++]: oxycodone helps with back pain, groin pain elicited by standing and walking  [++++Pain Frequency++++]: constant back pain with varying intensity  [++++Pain Location++++]: left lower back  [++++ Clinical Pain Assessment++++]    [++++ Clinical Pain Assessment++++]  [++++Pain Severity++++]: Pain: 8  [++++Pain Character++++]: deep, aching  [++++Pain Duration++++]: since 2/2022  [++++Pain Effect++++]: limits function, emotional distress  [++++Pain Factors++++]: unable to identify provoking factors  [++++Pain Frequency++++]: constant  [++++Pain Location++++]: right lower abdomen  [++++ Clinical Pain Assessment++++]         FUNCTIONAL ASSESSMENT:     Palliative Performance Scale (PPS):  PPS: 60       PSYCHOSOCIAL/SPIRITUAL SCREENING:     Any spiritual / Jewish concerns:  [] Yes /  [x] No    Caregiver Burnout:  [] Yes /  [x] No /  [] No Caregiver Present      Anticipatory grief assessment:   [x] Normal  / [] Maladaptive       ESAS Anxiety: Anxiety: 10    ESAS Depression: Depression: 5       REVIEW OF SYSTEMS:     The following systems were [x] reviewed / [] unable to be reviewed  Systems: constitutional, ears/nose/mouth/throat, respiratory, gastrointestinal, genitourinary, musculoskeletal, integumentary, neurologic, psychiatric, endocrine. Positive findings noted below.   Modified ESAS Completed by: provider   Fatigue: 7 Drowsiness: 5 Depression: 5 Pain: 8   Anxiety: 10 Nausea: 2   Anorexia: 5 Dyspnea: 0   Best Well-Bein Constipation: No   Other Problem (Comment): 0          PHYSICAL EXAM:     Wt Readings from Last 3 Encounters:   22 146 lb (66.2 kg)   02/15/22 160 lb 11.2 oz (72.9 kg)   02/15/22 161 lb 12.8 oz (73.4 kg)     Blood pressure (!) 140/96, pulse 99, temperature 97.3 °F (36.3 °C), temperature source Oral, resp. rate 20, height 5' 7\" (1.702 m), weight 146 lb (66.2 kg), SpO2 98 %. Last bowel movement: See Nursing Note    Constitutional: sitting in chair, appears fatigued  Eyes: pupils equal, anicteric  ENMT: no nasal discharge, moist mucous membranes  Cardiovascular: regular rhythm, no peripheral edema  Respiratory: breathing not labored, symmetric  Gastrointestinal: soft non-tender, +bowel sounds; ostomy bag RLQ   Musculoskeletal: no deformity; bilateral temporal muscle wasting  Skin: warm, dry  Neurologic: following commands, moving all extremities  Psychiatric: full affect, no hallucinations  Other:        HISTORY:     Past Medical History:   Diagnosis Date    Anxiety     Cancer [facility])     lymphoma 2018 receiving chemo    Chronic pain     lower back- lymphoma    Hyperlipidemia     Hypertension     Lymphadenopathy 2018      Past Surgical History:   Procedure Laterality Date    HX HEART CATHETERIZATION  2019    HX OTHER SURGICAL  2019    cardiac stent    IR INJECTION PSEUDOANEURYSM  2019      Family History   Problem Relation Age of Onset    Hypertension Father     Diabetes Father     Diabetes Mother       History reviewed, no pertinent family history.   Social History     Tobacco Use    Smoking status: Current Every Day Smoker     Packs/day: 0.50     Years: 20.00     Pack years: 10.00    Smokeless tobacco: Never Used   Substance Use Topics    Alcohol use: No     No Known Allergies   Current Outpatient Medications   Medication Sig    traZODone (DESYREL) 50 mg tablet Take 50 mg by mouth nightly. Take 50 mg nightly    potassium chloride (KLOR-CON) 10 mEq tablet Take 1 Tab by mouth daily.  escitalopram oxalate (LEXAPRO) 20 mg tablet TAKE 1 TABLET BY MOUTH EVERY DAY    metoprolol succinate (TOPROL-XL) 50 mg XL tablet TAKE 1 TABLET BY MOUTH EVERY DAY    LORazepam (Ativan) 0.5 mg tablet Take 1 Tablet by mouth.  lisinopriL (PRINIVIL, ZESTRIL) 40 mg tablet TAKE 1 TABLET BY MOUTH EVERY DAY    atorvastatin (LIPITOR) 40 mg tablet TAKE 1 TAB BY MOUTH NIGHTLY. INDICATIONS: TREATMENT TO SLOW PROGRESSION OF CORONARY ARTERY DISEASE    oxyCODONE IR (ROXICODONE) 10 mg tab immediate release tablet Take 1 Tablet by mouth every four (4) hours as needed for Pain for up to 15 days. Max Daily Amount: 60 mg. (Patient not taking: Reported on 3/2/2022)    docusate sodium (COLACE) 100 mg capsule Take 1 Capsule by mouth two (2) times a day for 90 days. (Patient not taking: Reported on 3/2/2022)    ondansetron (ZOFRAN ODT) 8 mg disintegrating tablet Take 1 Tablet by mouth every eight (8) hours as needed for Nausea. (Patient not taking: Reported on 3/2/2022)    aspirin 81 mg chewable tablet Take 81 mg by mouth daily. (Patient not taking: Reported on 3/2/2022)    L. acidoph & paracasei- S therm- Bifido (AUSTIN-Q/RISAQUAD) 8 billion cell cap cap take 1 cap daily (Patient not taking: Reported on 9/2/2021)    predniSONE (DELTASONE) 20 mg tablet take 5 tablet by oral route  every day. Take on Days 1 through 5 each cycle (Patient not taking: Reported on 6/10/2021)    prochlorperazine (Compazine) 10 mg tablet Take 1 Tab by mouth. (Patient not taking: Reported on 6/10/2021)    senna-docusate (PERICOLACE) 8.6-50 mg per tablet Take 1 Tab by mouth. (Patient not taking: Reported on 6/10/2021)    ondansetron hcl (ZOFRAN) 4 mg tablet Take 2 Tabs by mouth every eight (8) hours as needed for Nausea or Vomiting.  (Patient not taking: Reported on 6/10/2021)    naloxone [facility]) 4 mg/actuation nasal spray Use 1 spray intranasally, then discard. Repeat with new spray every 2 min as needed for opioid overdose symptoms, alternating nostrils. (Patient not taking: Reported on 9/2/2021)    clopidogrel (PLAVIX) 75 mg tab 1 Tab by Per NG tube route daily. (Patient not taking: Reported on 3/2/2022)     No current facility-administered medications for this visit. LAB DATA REVIEWED:     Lab Results   Component Value Date/Time    WBC 10.6 02/13/2022 10:48 PM    HGB 10.5 (L) 02/13/2022 10:48 PM    PLATELET 328 57/79/8830 10:48 PM     Lab Results   Component Value Date/Time    Sodium 140 02/13/2022 10:48 PM    Potassium 3.9 02/13/2022 10:48 PM    Chloride 110 (H) 02/13/2022 10:48 PM    CO2 27 02/13/2022 10:48 PM    BUN 15 02/13/2022 10:48 PM    Creatinine 1.19 02/13/2022 10:48 PM    Calcium 9.0 02/13/2022 10:48 PM    Magnesium 1.7 01/12/2019 04:05 AM    Phosphorus 2.0 (L) 01/12/2019 04:05 AM      Lab Results   Component Value Date/Time    Alk. phosphatase 112 02/13/2022 10:48 PM    Protein, total 7.1 02/13/2022 10:48 PM    Albumin 3.3 (L) 02/13/2022 10:48 PM    Globulin 3.8 02/13/2022 10:48 PM     Lab Results   Component Value Date/Time    INR 1.1 02/22/2019 08:18 PM    Prothrombin time 10.8 02/22/2019 08:18 PM    aPTT 27.8 02/22/2019 08:18 PM      Lab Results   Component Value Date/Time    Iron 17 (L) 02/15/2022 02:33 PM    TIBC 346 02/15/2022 02:33 PM    Iron % saturation 5 (L) 02/15/2022 02:33 PM    Ferritin 16 (L) 02/15/2022 02:33 PM          MRI lumbar spine 12/18/19:  Congenital narrowing of the lumbar canal. Vertebral body heights are maintained. Marrow signal is normal.     The conus medullaris terminates at T12/L1. Signal and caliber of the distal  spinal cord are within normal limits.  There is no pathologic intrathecal  enhancement.     The paraspinal soft tissues are within normal limits.     Lower thoracic spine: No herniation or stenosis.     L1-L2: No herniation or stenosis.     L2-L3: No herniation or stenosis.     L3-L4: Mild facet arthropathy. Minimal central disc protrusion. Mild canal  stenosis. Foramina are patent     L4-L5: Desiccation. Mild facet arthropathy. Canal demonstrates minimal stenosis. There is an annular ligament tear far laterally on the left. Mild left foraminal  stenosis.     L5-S1: Mild facet arthropathy. Canal and foramina are patent. IMPRESSION:  Mild disc degenerative change at L3-4 and L4-5.     Mild canal stenosis at L3-4 and mild left foraminal stenosis at L4-5.     Other less severe degenerative findings are as described above. CT chest/abdomen 12/16/19:  FINDINGS:      THYROID: No nodule. MEDIASTINUM: No mass or lymphadenopathy. Port catheter in place on the right. Catheter tip in appropriate position. SB: No mass or lymphadenopathy. THORACIC AORTA: No dissection or aneurysm. MAIN PULMONARY ARTERY: Unremarkable  TRACHEA/BRONCHI: Unremarkable  ESOPHAGUS: No wall thickening or dilatation. HEART: Normal in size. PLEURA: No effusion or pneumothorax. LUNGS: Bibasilar atelectasis is noted. Dee Dee Crumble LIVER: No mass or biliary dilatation. GALLBLADDER: Layering contrast versus cholelithiasis. SPLEEN: No mass. PANCREAS: No mass or ductal dilatation. ADRENALS: The left adrenal gland is elevated by the retroperitoneal mass. The    right is unremarkable. KIDNEYS: There is diminished soft tissue density at the level of the left renal  hilum. There is increased left renal cortical atrophy. STOMACH: Unremarkable. SMALL BOWEL: No dilatation or wall thickening. COLON: No dilatation or wall thickening. APPENDIX: Unremarkable. PERITONEUM: No ascites or pneumoperitoneum. RETROPERITONEUM:   Diminished size of retroperitoneal mass. Diminished in size with margins difficult to fully ascertain.    2-62 35 x 50 mm previously 71 x 94 mm.     2-67 left periaortic adenopathy 25 x 17 mm  The SMA, celiac, and LIZZY are remain encased, diminished attenuation of these  vessels.      REPRODUCTIVE ORGANS: The prostate and seminal vesicles are unremarkable. URINARY  BLADDER: Unremarkable  BONES: No destructive bone lesion. ADDITIONAL COMMENTS: Resolved left inguinal pseudoaneurysm. .       IMPRESSION:    Baseline research examination. Findings are consistent with interval response to therapy.      Continued diminished size of retroperitoneal mass-adenopathy,  with diminished soft tissue density at the left renal hilum. CT chest/abdomen/pelvis 2/14/22:  1. Annular mass lesion of the right colon with upstream fecal retention  concerning for primary colon neoplasm versus less likely lymphoma. 2. Soft tissue stranding around celiac axis, SMA, and abdominal aorta may  reflect infiltrative lymphoma. 3. Left renal atrophy with left hydronephrosis likely reflecting chronic  proximal ureteral obstruction. 4. Incidentals as above.       CONTROLLED SUBSTANCES SAFETY ASSESSMENT (IF ON CONTROLLED SUBSTANCES):     Reviewed opioid safety handout:  [x] Yes   [] No  24 hour opioid dose >150mg morphine equivalent/day:  [] Yes   [x] No  Benzodiazepines:  [x] Yes   [] No  Sleep apnea:  [] Yes   [x] No  Urine Toxicology Testing within last 6 months:  [] Yes   [x] No  History of or new aberrant medication taking behaviors:  [] Yes   [x] No  Has Narcan been prescribed [x] Yes   [] No          Total time:   Counseling / coordination time:   > 50% counseling / coordination?:

## 2022-03-02 NOTE — PATIENT INSTRUCTIONS
Dear Petrona Montero. ,    It was a pleasure seeing you today in [facility] [address]. We will see you again in 1 week to coordinate with your return to see Dr. Blank Mixon. If labs or imaging tests have been ordered for you today, please call the office  at [phone] 48 hours after completion to obtain the results. Your stated goal:     Your described symptoms were: Fatigue: 7 Drowsiness: 5   Depression: 5 Pain: 8   Anxiety: 10 Nausea: 2   Anorexia: 5 Dyspnea: 0   Best Well-Bein Constipation: No   Other Problem (Comment): 0       This is the plan we talked about:    1. Abdominal pain  -Start oxycodone 10-mg every 4 hours as needed  -You may need a long-acting opioid for optimal pain control  -I will see you again in 1 week to assess for this    2. Depression/anxiety  -Continue Lexapro 20-mg daily  -I'm avoiding benzodiazepines due to synergistic effect with opioids  -I will have Gary Lizama, the  in Dr. Roger Michel office, reach out to you    3. Poor appetite/weight loss  -We talked about eating multiple, small \"meals\" during the day  -We can refer you to the [employer] if you continue to lose weight     4. Colon mass  -You are waiting to the pathology results  -You have a follow-up appointment next week with Dr. Blank Mixon to review this    This is what you have shared with us about Advance Care Planning:      Primary Decision Maker: [de-identified] - Ex-Spouse - [phone]  Advance Care Planning 3/2/2022   Patient's [address] is: Legal Next [facility] [address] Directive None   Patient Would Like to Complete Advance Directive -   Does the patient have other document types -           The Palliative Medicine Team is here to support you and your family.        Sincerely,      Orion Dacosta MD and the Palliative Medicine Team

## 2022-03-02 NOTE — PROGRESS NOTES
Palliative Medicine Office Visit  Palliative Medicine Nurse Check In  [phone]    Patient Name: Ricarda Chen. YOB: 1975      Date of Office Visit:     Patient states: \"  \"    From Check In Sheet (scanned in Media):  Has a medical provider talked with you about cardiopulmonary resuscitation (CPR)? [x] Yes   [] No   [] Unable to obtain    Nurse reminder to complete or update ACP FlowSheet:    Is ACP on the Problem List?    [] Yes    [x] No  IF ACP Document is ON FILE; Nurse to place ACP on Problem List     Is there an ACP Note in Chart Review/Note? [x] Yes    [] No   If NO: ALERT PROVIDER       Primary Decision Maker: [de-identified] - Ex-Spouse - [phone]  Advance Care Planning 3/2/2022   Patient's [address] is: Legal Next of Kin   Confirm Advance Directive None   Patient Would Like to Complete Advance Directive -   Does the patient have other document types -     Is there anything that we should know about you as a person in order to provide you the best care possible? Have you been to the ER, urgent care clinic since your last visit? [x] Yes   [] No   [] Unable to obtain    Have you been hospitalized since your last visit? [x] Yes   [] No   [] Unable to obtain    Have you seen or consulted any other health care providers outside of the [address] [place] [address] since your last visit?    [x] Yes   [] No   [] Unable to obtain    Functional status (describe):         Last BM: Colostomy bag      accessed (date): 3/2/2022    Bottle review (for opioid pain medication): Patient did not bring in pain medication   Medication 1:   Date filled:   Directions:   # filled:   # left:   # pills taking per day:  Last dose taken:    Medication 2:   Date filled:   Directions:   # filled:   # left:   # pills taking per day:  Last dose taken:    Medication 3:   Date filled:   Directions:   # filled:   # left:   # pills taking per day:  Last dose taken:    Medication 4:   Date filled:   Directions:   # filled:   # left:   # pills taking per day:  Last dose taken:

## 2022-03-02 NOTE — TELEPHONE ENCOUNTER
Called patient to confirm if he is able to come in for an appointment with Dr. Dinh Rondon today at 3:30. Got voicemail. Left message for call back.

## 2022-03-03 ENCOUNTER — APPOINTMENT (OUTPATIENT)
Dept: INFUSION THERAPY | Age: 47
End: 2022-03-03

## 2022-03-04 NOTE — PROGRESS NOTES
[id number] [facility] Oncology at [address] [place] [address]  [phone]    Hematology / Oncology Established Visit    Reason for Visit:   Gera Valdivia is a 42 y.o. male who is seen for urgent follow up of colon mass, h/o lymphoma. Hematology Oncology Treatment History:     Diagnosis: Follicular lymphoma  Stage: IV  Pathology:   11/13/18 right inguinal LN excision: Follicular lymphoma, high-grade (grade 3a of 3). Prior Treatment:   1. Obinutuzumab-CHOP. Obinutuzumab: 1000 mg weekly on days 1, 8, 15 for cycle 1, then 1000 mg on day 1 q21 days for cycles 2-6, then monotherapy 1000 mg every 21 days for cycle 7, 8 with Cyclophosphamide 750mg/m2, Doxorubicin 50mg/m2, Vincristine 1.4mg/m2 on day 1 and Prednisone 100mg on Days 1-5, every 21 days for a total of 2 cycles completed late 1/2019. Regimen discontinued due to STEMI. 2. Obinutuzumab + Bendamustine: 1000 mg Obinutuzumab on day 1 + Bendamustine 90mg/m2 on days 1-2 on a 28-day cycle x 4 cycles, completed 5/2019  Current Treatment: Surveillance      Diagnosis: Presumed colon cancer:  Stage: pending  Pathology:  Pending  Prior Treatment: Loop ileostomy 2/19/22  Current Treatment: Pending    Oncologic History:  Was in his usual state of health in early November 2018 when he developed low back pain and presented to the ER. Work-up at outside hospital revealed a retroperitoneal mass seen on CT imaging, and he was transferred to [facility] for further work-up. CT there showed a large retroperitoneal mass encircling the aorta with invasion of the left renal hilum and left adrenal gland. There were bilateral inguinal lymph nodes and moderate left hydronephrosis. He was evaluated while at [facility] and was noted to have palpable nodes in his groin for approximately the past 1 month. He underwent excisional LN biopsy of right inguinal LN, which revealed follicular lymphoma. At time of diagnosis, he had no cardiac disease aside from HTN, hyperlipidemia. However, he did have an NSTEMI after cycle 2 of O-CHOP. Was likely unrelated to chemotherapy, but opted to switch treatment to Obinutuzumab-Bendamustine. He completed treatment in 5/2019 and had a CR based on PET. History of Present Illness:   Mr. Max Morgan is a 42 y.o. male with prior h/o follicular lymphoma is seen for follow up of colon mass. Since our last visit, he was hospitalized at [facility] with large bowel obstruction due to transverse colon mass and underwent diagnostic lap and loop ileostomy creation on 2/19/22 by Dr. Cristina Cifuentes. Underwent colonoscopy on 2/25/22 with malignant appearing mass seen and biopsied. He was told by his surgeon that he will follow up with him after pathology resulted and discuss surgical resection of the colon mass in the near future. Is scheduled to see Dr. Cristina Cifuentes on 3/21/22 at [facility]. Is fatigued. No blood in stool or colostomy bag. Is having RLQ pain which is constant. PMHx: Lymphoma in 2018, CAD, HTN, Hyperlipidemia, Anxiety, chronic low back pain  PSurgHx: None aside from cardiac stent placement and port placement  SHx: Smokes 1/2ppd x past 20 yrs. Works part time as a cook. Has 2 children. FHx: Father had leukemia, passed away from pancreatic cancer. Review of Systems: A complete review of systems was obtained, negative except as described above. Physical Exam:     Visit Vitals  /87   Pulse 93   Resp 16   Ht 5' 7\" (1.702 m)   Wt 146 lb 12.8 oz (66.6 kg)   SpO2 98%   BMI 22.99 kg/m²     ECOG PS: 0  General: Well developed, no acute distress, face mask in place. Eyes: Anicteric sclerae  HENT: Atraumatic, OP clear  Neck: Supple  Lymphatic: No cervical, supraclavicular, axillary adenopathy  Respiratory: CTAB, normal respiratory effort  CV: Normal rate, regular rhythm, no murmurs, no peripheral edema  GI: Soft, + TTP to RLQ, colostomy in place with green/brown stool, no masses  MS: Normal gait and station. Digits without clubbing or cyanosis.   Skin: No rashes, ecchymoses, or petechiae. Normal temperature, turgor, and texture. Neuro/Psych: Alert, oriented. Moves all 4 extremities. Appropriate affect, normal judgment/insight. Results:     Lab Results   Component Value Date/Time    WBC 10.6 02/13/2022 10:48 PM    HGB 10.5 (L) 02/13/2022 10:48 PM    HCT 32.9 (L) 02/13/2022 10:48 PM    PLATELET 599 71/18/6841 10:48 PM    MCV 87.3 02/13/2022 10:48 PM    ABS. NEUTROPHILS 7.4 02/13/2022 10:48 PM    Hemoglobin (POC) 15.0 06/05/2009 02:13 PM    Hematocrit (POC) 39 02/14/2019 01:24 PM     Lab Results   Component Value Date/Time    Sodium 140 02/13/2022 10:48 PM    Potassium 3.9 02/13/2022 10:48 PM    Chloride 110 (H) 02/13/2022 10:48 PM    CO2 27 02/13/2022 10:48 PM    Glucose 115 (H) 02/13/2022 10:48 PM    BUN 15 02/13/2022 10:48 PM    Creatinine 1.19 02/13/2022 10:48 PM    GFR est AA >60 02/13/2022 10:48 PM    GFR est non-AA >60 02/13/2022 10:48 PM    Calcium 9.0 02/13/2022 10:48 PM    Sodium (POC) 136 02/14/2019 01:24 PM    Potassium (POC) 3.9 02/14/2019 01:24 PM    Chloride (POC) 102 02/14/2019 01:24 PM    Glucose (POC) 249 (H) 02/15/2019 10:21 PM    BUN (POC) 14 02/14/2019 01:24 PM    Creatinine (POC) 0.9 02/14/2019 01:24 PM    Calcium, ionized (POC) 1.24 02/14/2019 01:24 PM     Lab Results   Component Value Date/Time    Bilirubin, total 0.1 (L) 02/13/2022 10:48 PM    ALT (SGPT) 14 02/13/2022 10:48 PM    Alk.  phosphatase 112 02/13/2022 10:48 PM    Protein, total 7.1 02/13/2022 10:48 PM    Albumin 3.3 (L) 02/13/2022 10:48 PM    Globulin 3.8 02/13/2022 10:48 PM     Lab Results   Component Value Date/Time    Iron 17 (L) 02/15/2022 02:33 PM    TIBC 346 02/15/2022 02:33 PM    Iron % saturation 5 (L) 02/15/2022 02:33 PM    Ferritin 16 (L) 02/15/2022 02:33 PM       No results found for: B12LT, FOL, RBCF  Lab Results   Component Value Date/Time    TSH 1.53 09/28/2016 04:40 AM       Lab Results   Component Value Date/Time    Hepatitis A, IgM NONREACTIVE 12/27/2018 04:53 PM Hepatitis B surface Ag <0.10 2018 04:53 PM    Hepatitis B core, IgM NONREACTIVE 2018 04:53 PM       18:       Labs at [facility]:  22: CA 19-9 = 390  22: CEA = 12.3    Imagin/9/18 Abd/pelvis CT: IMPRESSION:  1. Interval development of a large retroperitoneal mass encircling the aorta with invasion of the left renal hilum and left adrenal gland. Several adjacent lymph nodes are seen extending into the peritoneum and underneath the  diaphragmatic natalie. This most likely represents lymphoma. 2. Several new bilateral enlarged inguinal lymph nodes also likely representing lymphoma. 3. Moderate left hydronephrosis with a delayed renal nephrogram related to decreased renal function. This is related to the invasion of the renal hilum. 18 Chest CT: IMPRESSION:  Trace left pleural effusion. Bilateral lower lobe atelectasis. Large  retroperitoneal mass lesion again demonstrated. PET 18:  FINDINGS:  HEAD/NECK: Right palatine tonsil intense hypermetabolism, max SUV 18. Multilevel  bilateral cervical adenopathy, with max SUV 12 in a left supraclavicular node. Cerebral evaluation is limited by normal intense activity. CHEST: Solitary hypermetabolic left axillary node, max SUV 11. ABDOMEN/PELVIS: Bulky retroperitoneal mass max SUV 27, with several additional  small active abdomino-pelvic nodes. Bilateral inguinal nodes with max SUV 12 on  the left. SKELETON: No foci of abnormal hypermetabolism in the axial and visualized  appendicular skeleton. IMPRESSION:   1. Right palatine tonsil tumor involvement (Deauville 5). 2. Bilateral cervical delaney involvement (Deauville 5). 3. Left axillary node involvement (Deauville 5). 4. Bulky retroperitoneal lymphoma mass and additional smaller hypermetabolic  abdomino-pelvic nodes (Deauville 5). 5. Bilateral inguinal delaney involvement (Deauville 5). Deauville Five Point Scale  1.  No uptake or no residual uptake (when used interim)  2. Slight uptake, but below blood pool (mediastinum)  3. Uptake above mediastinal, but below/equal to uptake in the liver  4. Uptake slightly to moderately higher than liver  5. Markedly increased uptake or any new lesion (on response evaluation)  Each FDG-avid (or previously FDG avid) lesion is rated independently. Reference values:  Mediastinal blood pool: 2.1 SUV  Liver (background): 2.2 SUV    PET/CT 2/05/19:   IMPRESSION:  1. No Foci of Abnormal Hypermetabolism (Deauville 1). 2. Resolved activity in the right palatine tonsil, bilateral cervical nodes,left axillary node, retroperitoneal/abdominal pelvic adenopathy, bilateral inguinal nodes. Echo 2/14/19:  Normal cavity size, wall thickness and systolic function (ejection fraction normal). The muscle mass is normal. The cavity shape is normal. The estimated ejection fraction is 41 - 45%. Abnormal wall motion as described on the wall scoring diagram below. End-systolic volume is normal. Normal left ventricular strain. There is mild (grade 1) left ventricular diastolic dysfunction. Normal left ventricular diastolic pressure. End-diastolic volume is normal.    LE arterial duplex 2/22/19:  There is evidence of left groin pseudoaneurysm noted arising from distal common femoral artery, pseudo lobe measures 2.32cm x 2.58cm and pseudo neck length measuring 0.63cm. There is no evidence of hemodynamically significant left lower extremity arterial obstruction. JACLYN is 1.03 on the right and 1.02 on the left. LE arterial duplex s/p Thrombin Injection to Pseudoaneurysm 2/26/19:  Successful thrombin injection procedure of the left groin with no further flow seen. No evidence of hemodnyamically significant obstruction in the left lower extremity. Left lower extremity arterial duplex performed. Confirmed pseudoaneurysm in left groin with small neck. Following thrombin injection, no further flow seen in the pseudoaneurysm.   The left common femoral, profunda femoral, femoral, popliteal, posterior tibial and anterior arteries were imaged. Mainly triphasic flow was seen with no evidence of significantly elevated velocities. Repeat LE arterial duplex 2/27/19:  Continued thrombosed left groin pseudoaneurysm following thrombin injection on 02/26/2019. No flow or color fill is identified. The hematoma measures approximately 2.1 x 2.9 cm in diameter. The common femoral, deep femoral, femoral, and popliteal arteries are patent with mainly tri-phasic flow and no significant hemodynamically obstruction is noted. Stress 5/31/19:  · Normal stress myocardial perfusion without ischemia or infact at 84% MPHR. Normal LV function. LVEF 60%. · No EKG changes of ischemia at peak exercise. · Normal functional capacity. PET 6/03/19: IMPRESSION: No Foci of Abnormal Hypermetabolism (Deauville 1). -MRI lumbar spine 12/18/19:  Mild disc degenerative change at L3-4 and L4-5. Mild canal stenosis at L3-4 and mild left foraminal stenosis at L4-5. Other less severe degenerative findings are as described above. Continued diminished size of retroperitoneal mass-adenopathy,  with diminished soft tissue density at the left renal hilum. -CT chest/abdomen 12/16/19:  Findings are consistent with interval response to therapy    CT c/a/p 5/28/20: IMPRESSION:  Further contraction of the retroperitoneal mantle and chris mesentery,  compatible with treatment response  Stable left hilar soft tissue mass  Slight increased splaying of the duodenum and proximal jejunum is the result, without obstruction  No evidence for recurrence of lymphoma in the chest, abdomen, or pelvis    CT c/a/p 2/13/22:  FINDINGS:   LOWER THORAX: Dependent atelectasis with otherwise clear lungs. The visualized  heart is normal in size without pericardial effusion. LIVER: No mass. BILIARY TREE: Gallbladder is unremarkable. CBD is not dilated. SPLEEN: Unremarkable.   PANCREAS: No mass or ductal dilatation. ADRENALS: Unremarkable. KIDNEYS: Atrophic left kidney with mild left hydronephrosis. Right kidney is  unremarkable with no stone, enhancing mass, or other renal abnormality. STOMACH: Unremarkable. SMALL BOWEL: No dilatation or wall thickening. COLON: Circumferential wall thickening at the hepatic flexure with luminal  narrowing, adjacent mesenteric stranding, and fluid with upstream retention in  the cecum and fecalization of distal ileal contents. No dilation or other wall  thickening. APPENDIX: Unremarkable. PERITONEUM: Moderate free fluid with no pneumoperitoneum. RETROPERITONEUM: Soft tissue stranding around the celiac and SMA and associated aorta with no discrete mass or adenopathy. Aorta is normal in size without aneurysm or dissection. REPRODUCTIVE ORGANS: Prostate and seminal vesicles appear unremarkable. URINARY BLADDER: No mass or calculus. BONES: No destructive bone lesion. ABDOMINAL WALL: No mass or hernia. ADDITIONAL COMMENTS: N/A  IMPRESSION  1. Annular mass lesion of the right colon with upstream fecal retention  concerning for primary colon neoplasm versus less likely lymphoma. 2. Soft tissue stranding around celiac axis, SMA, and abdominal aorta may  reflect infiltrative lymphoma. 3. Left renal atrophy with left hydronephrosis likely reflecting chronic  proximal ureteral obstruction. 4. Incidentals as above. 2/24/22 CT abd/pelvis at [facility]:  FINDINGS: An enteric tube with sidehole beyond the level of the lower esophageal sphincter is seen looping on itself in the proximal stomach before terminating in the mid stomach. Status post right lower quadrant diverting ileostomy for an obstructing colonic mass. Multiple loops of gas dilated small bowel remain, with a maximal diameter of 5.4 cm whereas previously measured 3.6 cm. Dilute contrast is seen within the lateral left abdominal dilated small bowel.  Moderate stool burden and bowel gas opacifies the cecum, measuring 7.3 cm in diameter.    No definite supine evidence of pneumoperitoneum. Lung bases: Limited evaluation of the lung bases demonstrates a cardiac silhouette within normal limits for size. A small bore central venous catheter is seen terminating in the right atrium. No focal consolidation or pleural effusion. 2/24/22 CT abd/pelvis [facility]:  IMPRESSION:  1. Status post right lower quadrant loop ileostomy. Mild diffuse dilation of small bowel proximal to the ostomy in the lower abdomen and upper pelvis concerning for at least mild to moderate partial bowel obstruction. Relatively decompressed loop ileostomy. 2. Mildly complex pelvic fluid collection in the rectovesical space, decreased in size from prior. 3. Redemonstrated ascending colon mass and findings suspicious for regional metastatic disease. 4. Redemonstrated soft tissue in the retroperitoneum with prominent lymph nodes, similar to prior examination. Differential would include metastasis, retroperitoneal fibrosis. 5. Mild atherosclerosis. 6. Subcentimeter right renal hypodensity, too small to characterize. 7. Severe compression of the left renal vein, similar to prior examination. 8. Additional findings as described above. Bones: No acute osseous abnormality. 2/24/22 CT chest [facility]:  IMPRESSION:  FINAL report. 1. No evidence of metastatic disease to the chest.  2. No enlarged lymph nodes. 3. Increasing volume loss in the lung bases which may be due to atelectasis. 4. CT abdomen and pelvis reported separately.     2/25/22 Colonoscopy at [facility]:  Impression:            - Preparation of the colon was inadequate.                        - Stool in the entire examined colon.                        - Likely malignant completely obstructing tumor at the                         hepatic flexure. Biopsied.                        - Malignant-appearing tumor in the colon. Complications:         No immediate complications.     Assessment & Plan:   Andres Conley. is a 42 y.o. male comes in for evaluation and management of lymphoma. 1. Transverse colon mass / normocytic anemia:  S/p diverting ileostomy due to large bowel obstruction. Underwent colonoscopy with biopsy on 2/25/22. No obvious metastatic disease on CT imaging. Discussed with patient that once pathology has resulted, I recommend he proceed with surgical resection, likely extended R colectomy with re-anastamosis - but will defer to his surgeon. No indication for neoadjuvant chemotherapy, but pt may need adjuvant chemotherapy in near future. -- Requested pathology from colon biopsy at [address] [place] [address] imaging to be pushed over to PACS here. -- Follow up with colorectal surgery for plan regarding transverse colon mass and future reversal of ileostomy. -- Venofer x 5   -- Check CEA, CA 19-9 after surgery  -- Repeat colonoscopy in 6 months given incomplete colonoscopy. -- Return in 5 weeks    2. H/o Follicular lymphoma: Grade 3a. Although grade 3a disease is considered more indolent and can be treated like grade 1/2 disease, this patient has indications for treatment: bulky disease encircling the aorta causing symptoms. Bone marrow negative for lymphoma, but was hypercellular. BR better than RCHOP, but based on GALLIUM study, Obinutuzumab-based induction and maintenance prolongs PFS over that seen with rituximab-based therapy. Therefore, I have chosen to treat patient with O-CHOP regimen for 6 cycles, followed by possible maintenance Obinutuzumab. We discussed the risks and benefits of O-CHOP chemotherapy. The potential side effects include, but are not limited to: nausea, vomiting, diarrhea, constipation, Flu-like symptoms, infusion reaction, allergic reaction, flushing, taste changes, increased risk of infection, anemia, fatigue, alopecia, neuropathy, nail changes, cardiac damage, mucositis, myleosuppression, infertility and rarely, death.  Patient completed chemoteaching and received a chemotherapy education folder. CR after 2 cycles of O-CHOP, and O-Bendamustine due to cardiac event. Completed treatment 5/2019. PET completed 6/3/19 showed CR. CT 5/28/20 negative for disease. -- Surveillance now that pt is 2 yrs out from diagnosis and treatment will be H&P with labs q6mo. Imaging as clinically indicated. -- Put port removal on hold. 3. Chronic lumbar back pain/anxiety: Left lower back pain is chronic and stable currently on Oxycodone and Lexapro daily. Signed pain contract on 12/28/18 and following with Dr. Shannon Urena. 4. CAD: h/o STEMI 2/14/29 with TOM placed to proximal LAD. Following with Dr. Shiv Bolanos and remains on dual antiplatelet therapy, high dose Lipitor, Metoprolol, ACEI. Received only 2 doses of cardiotoxic chemo, Doxorubicin in early 1/2019. Is overdue for follow up with Dr. Shiv Bolanos. 5. Tobacco abuse: Discussed benefits of quitting smoking again. Previously discussed replacing hand-to-mouth habit with another item such as Twizzlers or SlimJims. 6. HTN: Well controlled on lisinopril. Managed by PCP.        Signed By: Shellie Guzman MD     March 8, 2022

## 2022-03-05 NOTE — PROGRESS NOTES
Palliative Medicine Outpatient Services  [place]: [phone] ([phone])    Patient Name: Yamil Bran. YOB: 1975    Date of Current Visit: 03/08/22  Location of Current Visit:    [] [facility] Office  [x] [facility] Office  [] [address] [place] [address]  [] Home  [] Other:  televisit    Date of Initial Visit: 2/19/19   Referral from: Maria Eugenia Hernández MD  Primary Care Physician: Myra Boateng MD      SUMMARY:   Yamil Jarrell is a 42y.o. year old with high-grade follicular lymphoma, who was referred to Palliative Medicine by Dr. Gregorio Chau for symptom management and supportive care. He was diagnosed in 11/2018 after presenting with back pain. He is currently receiving systemic chemotherapy. He suffered cardiac arrest during cycle #3 due to previously undiagnosed severe stenosis of the LAD s/p PTCA and stent. He has since completed systemic chemotherapy. Subsequent scans showed ongoing response to therapy. He was diagnosed with colon cancer in 2/14/22 and underwent loop ileostomy at [facility] on 2/19/22. The patients social history includes: he is single. He lives in [place] with his girlfriend, Marina Valentin, and their 15month old son. [facility] He has 3 teenage children who live with their mother and with whom he has close contact. 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 Palliative Medicine is addressing the following current patient/family concerns: abdominal pain related to malignancy; anxiety, depression; left mid- and low back pain, poor appetite, fatigue, advanced care planning. Initial Referral Intake note from Lupe Chaney RN reviewed prior to visit   PALLIATIVE DIAGNOSES:       ICD-10-CM ICD-9-CM    1. Abdominal pain, generalized  R10.84 789.07    2. Chronic left-sided low back pain without sciatica  M54.50 724.2     G89.29 338.29    3. Anxiety  F41.9 300.00    4. Reactive depression  F32.9 300.4    5. Poor appetite  R63.0 783.0    6. Other fatigue  R53.83 780.79    7. Lymphoma in remission ([facility] [address]  C85.90 202.80    8. Malignant neoplasm of hepatic flexure (HCC)  C18.3 153.0           PLAN:   Patient Instructions     Dear Paola Howell. ,    It was a pleasure seeing you today in [facility] [address]. We will see you again in 4 to 5 weeks to coordinate with your return to see Dr. Love Menon. If labs or imaging tests have been ordered for you today, please call the office  at [phone] 48 hours after completion to obtain the results. Your stated goal:     Your described symptoms were: Fatigue: 5 Drowsiness: 4   Depression: 0 Pain: 6   Anxiety: 8 Nausea: 0   Anorexia: 2 Dyspnea: 0   Best Well-Bein Constipation: No   Other Problem (Comment): 0       This is the plan we talked about:    1. Abdominal pain  -Continue oxycodone 10-mg every 4 hours as needed  -You take 4 to 5 pills a day which is working well for you    2. Depression/anxiety  -Continue Lexapro 20-mg daily  -I'm avoiding benzodiazepines due to synergistic effect with opioids    3. Poor appetite/weight loss  -You're eating and maintaining your weight     4. Colon mass  -You saw Dr. Love Menon today and will see her again in 4 to 5 weeks to review your biopsy results and further treatment if needed    This is what you have shared with us about Advance Care Planning:      Primary Decision Maker: [de-identified] - Ex-Spouse - [phone]  Advance Care Planning 3/2/2022   Patient's [address] is: Legal Next [facility] [address] Directive None   Patient Would Like to Complete Advance Directive -   Does the patient have other document types -           The Palliative Medicine Team is here to support you and your family.        Sincerely,      Bianca Willingham MD and the Palliative Medicine Team       GOALS OF CARE / TREATMENT PREFERENCES:   [====Goals of Care====]  GOALS OF CARE:  Patient / health care proxy stated goals: See Patient Instructions / Summary    TREATMENT PREFERENCES: Code Status:  [x] Attempt Resuscitation       [] Do Not Attempt Resuscitation    Advance Care Planning:  [x] The Pall Med Interdisciplinary Team has updated the ACP Navigator with Decision Maker and Patient Capacity      Primary Decision Maker: [de-identified] - Ex-Spouse - [phone]  [] Named in a scanned document   [x] Legal Next of Kin  [] Guardian    Other:  (If patient appropriate for POST, consider using PALLPOST smart phrase here)    The palliative care team has discussed with patient / health care proxy about goals of care / treatment preferences for patient.  [====Goals of Care====]     PRESCRIPTIONS GIVEN:     Medications Ordered Today   Medications    oxyCODONE IR (ROXICODONE) 10 mg tab immediate release tablet     Sig: Take 1 Tablet by mouth every four (4) hours as needed for Pain for up to 15 days. Max Daily Amount: 60 mg. Dispense:  90 Tablet     Refill:  0    oxyCODONE IR (ROXICODONE) 10 mg tab immediate release tablet     Sig: Take 1 Tablet by mouth every four (4) hours as needed for Pain for up to 15 days. Max Daily Amount: 60 mg.      Dispense:  90 Tablet     Refill:  0           FOLLOW UP:     Future Appointments   Date Time Provider Dahlia Epps   3/15/2022 10:00 AM SS INF3 CH4 <2H [facility]   3/17/2022 11:30 AM SS INF1 CH4 <2H [facility]   3/21/2022 11:30 AM Lynn Montalvo MD PCS BS AMB   3/21/2022  1:00 PM SS INF2 CH4 <2H [facility]   3/23/2022  1:00 PM SS INF2 CH4 <2H [facility]   3/25/2022  1:00 PM SS INF1 CH4 <2H [facility]   4/15/2022 10:30 AM Max BROOKS MD [facility] BS AMB   6/27/2022 10:00 AM Declan Coon DPM RPP BS AMB           PHYSICIANS INVOLVED IN CARE:   Patient Care Team:  Christian Ochoa MD as PCP - General (Family Medicine)  Ang Beck MD (Hematology and Oncology)  Aniceto Moreno MD as Physician (Palliative Medicine)  Aniceto Moreno MD as Physician (Palliative Medicine)       HISTORY: Reviewed patient-completed ESAS and advance care planning form. Reviewed patient record in prescription monitoring program.    CHIEF COMPLAINT:   Chief Complaint   Patient presents with    Abdominal Pain       HPI/SUBJECTIVE:    The patient is: [x] Verbal / [] Nonverbal     He's having less pain. He's passing stool in his ostomy bag on a daily basis. See Plan/Patient Instructions for additional interval history      From visit 3/2/22:  He started having abdominal pain about a month ago. Then he started feeling nauseous. He had trouble with bowel movements, feeling like he wasn't completing emptying his bowels. He went to the ED on 2/14, CT scan showed mass in his colon. He was hospitalized at [address] [place] [address] 2/15-2/25 for colonoscopy and loop ileostomy. He's still having pain in his abdomen. He's been taking 2 oxycodone every 4 to 6 hours which eases the pain to ~5/10 which is tolerable. He's always anxious. He continues to take Lexapro. He's eating, not as much as he used to. He ate 2 PBJs yesterday. He had mild nausea for a few days after he came home. He's passing formed stool in his ostomy bag daily. Home health is coming to his home. He sees Dr. Sofya Cardoza next week. From IV 2/19/19:  He has a lot of anxiety. He's lived with anxiety for a long time, sometimes it's been worse than others, like when he lost his job and lost his insurance. He took Wellbutrin then which made him more anxious and depressed. He's had more anxiety since he was diagnosed with lymphoma. He's been taking lorazepam and it doesn't help at all, even when he tried taking 2 pills. This is his biggest problem now. He feels depressed but it's not as bad as the anxiety.     He has pain in his back, that's what brought him to the hospital in November. He's been taking oxycodone which helps some. The groin pain started after his operation (cardiac cath) in the hospital last week. He expects that will get better as it heals. His pain doesn't bother him too much, not like the anxiety.     His appetite is getting better. He's lost ~30# since last fall but that's leveled off now.     He's moving his bowels regularly.     He sometimes gets short of breath but not as much as used to.     He doesn't have chest pain, never did.     He sleeps well at night. He doesn't have the energy to do much during the day.     He lives with his father. He sees his three teen-age children frequently (they live with their mother). His children give him a lot of strength.         Clinical Pain Assessment (nonverbal scale for nonverbal patients):   [++++ Clinical Pain Assessment++++]  [++++Pain Severity++++]: Pain: 6  [++++Pain Character++++]: stabbing pain in back  [++++Pain Duration++++]: months for back pain, weeks for groin pain  [++++Pain Effect++++]: little  [++++Pain Factors++++]: oxycodone helps with back pain, groin pain elicited by standing and walking  [++++Pain Frequency++++]: constant back pain with varying intensity  [++++Pain Location++++]: left lower back  [++++ Clinical Pain Assessment++++]    [++++ Clinical Pain Assessment++++]  [++++Pain Severity++++]: Pain: 6  [++++Pain Character++++]: deep, aching  [++++Pain Duration++++]: since 2/2022  [++++Pain Effect++++]: limits function, emotional distress  [++++Pain Factors++++]: unable to identify provoking factors  [++++Pain Frequency++++]: constant  [++++Pain Location++++]: right lower abdomen  [++++ Clinical Pain Assessment++++]         FUNCTIONAL ASSESSMENT:     Palliative Performance Scale (PPS):  PPS: 60       PSYCHOSOCIAL/SPIRITUAL SCREENING:     Any spiritual / Orthodoxy concerns:  [] Yes /  [x] No    Caregiver Burnout:  [] Yes /  [x] No /  [] No Caregiver Present      Anticipatory grief assessment:   [x] Normal  / [] Maladaptive       ESAS Anxiety: Anxiety: 8    ESAS Depression: Depression: 0       REVIEW OF SYSTEMS:     The following systems were [x] reviewed / [] unable to be reviewed  Systems: constitutional, ears/nose/mouth/throat, respiratory, gastrointestinal, genitourinary, musculoskeletal, integumentary, neurologic, psychiatric, endocrine. Positive findings noted below. Modified ESAS Completed by: provider   Fatigue: 5 Drowsiness: 4   Depression: 0 Pain: 6   Anxiety: 8 Nausea: 0   Anorexia: 2 Dyspnea: 0   Best Well-Bein Constipation: No   Other Problem (Comment): 0          PHYSICAL EXAM:     Wt Readings from Last 3 Encounters:   22 146 lb (66.2 kg)   22 146 lb 12.8 oz (66.6 kg)   22 146 lb (66.2 kg)     Blood pressure 124/87, pulse 93, temperature 96.9 °F (36.1 °C), temperature source Oral, resp. rate 16, height 5' 7\" (1.702 m), weight 146 lb (66.2 kg), SpO2 98 %. Last bowel movement: See Nursing Note    Constitutional: sitting in chair, appears rested  Eyes: pupils equal, anicteric  ENMT: no nasal discharge, moist mucous membranes  Cardiovascular: regular rhythm, no peripheral edema  Respiratory: breathing not labored, symmetric  Gastrointestinal: soft non-tender, +bowel sounds; ostomy bag RLQ   Musculoskeletal: no deformity; bilateral temporal muscle wasting  Skin: warm, dry  Neurologic: following commands, moving all extremities  Psychiatric: full affect, no hallucinations  Other:        HISTORY:     Past Medical History:   Diagnosis Date    Anxiety     Cancer [facility])     lymphoma 2018 receiving chemo    Chronic pain     lower back- lymphoma    Hyperlipidemia     Hypertension     Lymphadenopathy 2018      Past Surgical History:   Procedure Laterality Date    HX HEART CATHETERIZATION  2019    HX OTHER SURGICAL  2019    cardiac stent    IR INJECTION PSEUDOANEURYSM  2019      Family History   Problem Relation Age of Onset    Hypertension Father     Diabetes Father     Diabetes Mother       History reviewed, no pertinent family history.   Social History     Tobacco Use    Smoking status: Current Every Day Smoker     Packs/day: 0.50     Years: 20.00     Pack years: 10.00    Smokeless tobacco: Never Used   Substance Use Topics    Alcohol use: No     No Known Allergies   Current Outpatient Medications   Medication Sig    loperamide (IMODIUM) 2 mg capsule TAKE 1 CAPSULE BY MOUTH 3 TIMES A DAY BEFORE MEALS FOR 10 DAYS.  oxyCODONE IR (ROXICODONE) 10 mg tab immediate release tablet Take 1 Tablet by mouth every four (4) hours as needed for Pain for up to 15 days. Max Daily Amount: 60 mg.  potassium chloride (KLOR-CON) 10 mEq tablet Take 1 Tab by mouth daily.  escitalopram oxalate (LEXAPRO) 20 mg tablet TAKE 1 TABLET BY MOUTH EVERY DAY    metoprolol succinate (TOPROL-XL) 50 mg XL tablet TAKE 1 TABLET BY MOUTH EVERY DAY    LORazepam (Ativan) 0.5 mg tablet Take 1 Tablet by mouth.  lisinopriL (PRINIVIL, ZESTRIL) 40 mg tablet TAKE 1 TABLET BY MOUTH EVERY DAY    atorvastatin (LIPITOR) 40 mg tablet TAKE 1 TAB BY MOUTH NIGHTLY. INDICATIONS: TREATMENT TO SLOW PROGRESSION OF CORONARY ARTERY DISEASE    docusate sodium (COLACE) 100 mg capsule Take 1 Capsule by mouth two (2) times a day for 90 days. (Patient not taking: Reported on 3/2/2022)    ondansetron (ZOFRAN ODT) 8 mg disintegrating tablet Take 1 Tablet by mouth every eight (8) hours as needed for Nausea. (Patient not taking: Reported on 3/8/2022)    traZODone (DESYREL) 50 mg tablet Take 50 mg by mouth nightly. Take 50 mg nightly    aspirin 81 mg chewable tablet Take 81 mg by mouth daily. (Patient not taking: Reported on 3/2/2022)    L. acidoph & paracasei- S therm- Bifido (AUSTIN-Q/RISAQUAD) 8 billion cell cap cap take 1 cap daily (Patient not taking: Reported on 9/2/2021)    predniSONE (DELTASONE) 20 mg tablet take 5 tablet by oral route  every day. Take on Days 1 through 5 each cycle (Patient not taking: Reported on 6/10/2021)    prochlorperazine (Compazine) 10 mg tablet Take 1 Tab by mouth.  (Patient not taking: Reported on 6/10/2021)    senna-docusate (PERICOLACE) 8.6-50 mg per tablet Take 1 Tab by mouth. (Patient not taking: Reported on 3/8/2022)    ondansetron hcl (ZOFRAN) 4 mg tablet Take 2 Tabs by mouth every eight (8) hours as needed for Nausea or Vomiting. (Patient not taking: Reported on 3/8/2022)    naloxone [facility]) 4 mg/actuation nasal spray Use 1 spray intranasally, then discard. Repeat with new spray every 2 min as needed for opioid overdose symptoms, alternating nostrils. (Patient not taking: Reported on 9/2/2021)    clopidogrel (PLAVIX) 75 mg tab 1 Tab by Per NG tube route daily. (Patient not taking: Reported on 3/2/2022)     No current facility-administered medications for this visit. LAB DATA REVIEWED:     Lab Results   Component Value Date/Time    WBC 10.6 02/13/2022 10:48 PM    HGB 10.5 (L) 02/13/2022 10:48 PM    PLATELET 991 62/86/9814 10:48 PM     Lab Results   Component Value Date/Time    Sodium 140 02/13/2022 10:48 PM    Potassium 3.9 02/13/2022 10:48 PM    Chloride 110 (H) 02/13/2022 10:48 PM    CO2 27 02/13/2022 10:48 PM    BUN 15 02/13/2022 10:48 PM    Creatinine 1.19 02/13/2022 10:48 PM    Calcium 9.0 02/13/2022 10:48 PM    Magnesium 1.7 01/12/2019 04:05 AM    Phosphorus 2.0 (L) 01/12/2019 04:05 AM      Lab Results   Component Value Date/Time    Alk. phosphatase 112 02/13/2022 10:48 PM    Protein, total 7.1 02/13/2022 10:48 PM    Albumin 3.3 (L) 02/13/2022 10:48 PM    Globulin 3.8 02/13/2022 10:48 PM     Lab Results   Component Value Date/Time    INR 1.1 02/22/2019 08:18 PM    Prothrombin time 10.8 02/22/2019 08:18 PM    aPTT 27.8 02/22/2019 08:18 PM      Lab Results   Component Value Date/Time    Iron 17 (L) 02/15/2022 02:33 PM    TIBC 346 02/15/2022 02:33 PM    Iron % saturation 5 (L) 02/15/2022 02:33 PM    Ferritin 16 (L) 02/15/2022 02:33 PM          MRI lumbar spine 12/18/19:  Congenital narrowing of the lumbar canal. Vertebral body heights are maintained. Marrow signal is normal.     The conus medullaris terminates at T12/L1.  Signal and caliber of the distal  spinal cord are within normal limits. There is no pathologic intrathecal  enhancement.     The paraspinal soft tissues are within normal limits.     Lower thoracic spine: No herniation or stenosis.     L1-L2: No herniation or stenosis.     L2-L3: No herniation or stenosis.     L3-L4: Mild facet arthropathy. Minimal central disc protrusion. Mild canal  stenosis. Foramina are patent     L4-L5: Desiccation. Mild facet arthropathy. Canal demonstrates minimal stenosis. There is an annular ligament tear far laterally on the left. Mild left foraminal  stenosis.     L5-S1: Mild facet arthropathy. Canal and foramina are patent. IMPRESSION:  Mild disc degenerative change at L3-4 and L4-5.     Mild canal stenosis at L3-4 and mild left foraminal stenosis at L4-5.     Other less severe degenerative findings are as described above. CT chest/abdomen 12/16/19:  FINDINGS:      THYROID: No nodule. MEDIASTINUM: No mass or lymphadenopathy. Port catheter in place on the right. Catheter tip in appropriate position. SB: No mass or lymphadenopathy. THORACIC AORTA: No dissection or aneurysm. MAIN PULMONARY ARTERY: Unremarkable  TRACHEA/BRONCHI: Unremarkable  ESOPHAGUS: No wall thickening or dilatation. HEART: Normal in size. PLEURA: No effusion or pneumothorax. LUNGS: Bibasilar atelectasis is noted. [place] LIVER: No mass or biliary dilatation. GALLBLADDER: Layering contrast versus cholelithiasis. SPLEEN: No mass. PANCREAS: No mass or ductal dilatation. ADRENALS: The left adrenal gland is elevated by the retroperitoneal mass. The    right is unremarkable. KIDNEYS: There is diminished soft tissue density at the level of the left renal  hilum. There is increased left renal cortical atrophy. STOMACH: Unremarkable. SMALL BOWEL: No dilatation or wall thickening. COLON: No dilatation or wall thickening. APPENDIX: Unremarkable. PERITONEUM: No ascites or pneumoperitoneum. RETROPERITONEUM:   Diminished size of retroperitoneal mass. Diminished in size with margins difficult to fully ascertain. 2-62 35 x 50 mm previously 71 x 94 mm.     2-67 left periaortic adenopathy 25 x 17 mm  The SMA, celiac, and LIZZY are remain encased, diminished attenuation of these  vessels.      REPRODUCTIVE ORGANS: The prostate and seminal vesicles are unremarkable. URINARY  BLADDER: Unremarkable  BONES: No destructive bone lesion. ADDITIONAL COMMENTS: Resolved left inguinal pseudoaneurysm. .       IMPRESSION:    Baseline research examination. Findings are consistent with interval response to therapy.      Continued diminished size of retroperitoneal mass-adenopathy,  with diminished soft tissue density at the left renal hilum. CT chest/abdomen/pelvis 2/14/22:  1. Annular mass lesion of the right colon with upstream fecal retention  concerning for primary colon neoplasm versus less likely lymphoma. 2. Soft tissue stranding around celiac axis, SMA, and abdominal aorta may  reflect infiltrative lymphoma. 3. Left renal atrophy with left hydronephrosis likely reflecting chronic  proximal ureteral obstruction. 4. Incidentals as above.       CONTROLLED SUBSTANCES SAFETY ASSESSMENT (IF ON CONTROLLED SUBSTANCES):     Reviewed opioid safety handout:  [x] Yes   [] No  24 hour opioid dose >150mg morphine equivalent/day:  [] Yes   [x] No  Benzodiazepines:  [x] Yes   [] No  Sleep apnea:  [] Yes   [x] No  Urine Toxicology Testing within last 6 months:  [] Yes   [x] No  History of or new aberrant medication taking behaviors:  [] Yes   [x] No  Has Narcan been prescribed [x] Yes   [] No          Total time:   Counseling / coordination time:   > 50% counseling / coordination?:

## 2022-03-08 ENCOUNTER — APPOINTMENT (OUTPATIENT)
Dept: INFUSION THERAPY | Age: 47
End: 2022-03-08

## 2022-03-08 ENCOUNTER — OFFICE VISIT (OUTPATIENT)
Dept: PALLATIVE CARE | Age: 47
End: 2022-03-08
Payer: COMMERCIAL

## 2022-03-08 ENCOUNTER — OFFICE VISIT (OUTPATIENT)
Dept: ONCOLOGY | Age: 47
End: 2022-03-08

## 2022-03-08 ENCOUNTER — HOSPITAL ENCOUNTER (OUTPATIENT)
Dept: INFUSION THERAPY | Age: 47
Discharge: HOME | End: 2022-03-08

## 2022-03-08 ENCOUNTER — DOCUMENTATION ONLY (OUTPATIENT)
Dept: ONCOLOGY | Age: 47
End: 2022-03-08

## 2022-03-08 VITALS
OXYGEN SATURATION: 98 % | RESPIRATION RATE: 16 BRPM | WEIGHT: 146.8 LBS | SYSTOLIC BLOOD PRESSURE: 124 MMHG | DIASTOLIC BLOOD PRESSURE: 87 MMHG | BODY MASS INDEX: 23.04 KG/M2 | HEART RATE: 93 BPM | HEIGHT: 67 IN

## 2022-03-08 VITALS
DIASTOLIC BLOOD PRESSURE: 87 MMHG | BODY MASS INDEX: 22.91 KG/M2 | SYSTOLIC BLOOD PRESSURE: 124 MMHG | HEIGHT: 67 IN | HEART RATE: 93 BPM | WEIGHT: 146 LBS | OXYGEN SATURATION: 98 % | RESPIRATION RATE: 16 BRPM | TEMPERATURE: 96.9 F

## 2022-03-08 DIAGNOSIS — M54.50 CHRONIC LEFT-SIDED LOW BACK PAIN WITHOUT SCIATICA: ICD-10-CM

## 2022-03-08 DIAGNOSIS — K56.609 COLON OBSTRUCTION (HCC): ICD-10-CM

## 2022-03-08 DIAGNOSIS — C85.90 LYMPHOMA IN REMISSION (HCC): ICD-10-CM

## 2022-03-08 DIAGNOSIS — R10.31 RLQ ABDOMINAL PAIN: ICD-10-CM

## 2022-03-08 DIAGNOSIS — C18.3 MALIGNANT NEOPLASM OF HEPATIC FLEXURE (HCC): ICD-10-CM

## 2022-03-08 DIAGNOSIS — Z85.72 HISTORY OF FOLLICULAR LYMPHOMA: ICD-10-CM

## 2022-03-08 DIAGNOSIS — R53.83 OTHER FATIGUE: ICD-10-CM

## 2022-03-08 DIAGNOSIS — R63.0 POOR APPETITE: ICD-10-CM

## 2022-03-08 DIAGNOSIS — F32.9 REACTIVE DEPRESSION: ICD-10-CM

## 2022-03-08 DIAGNOSIS — R10.84 ABDOMINAL PAIN, GENERALIZED: Primary | ICD-10-CM

## 2022-03-08 DIAGNOSIS — D64.9 NORMOCYTIC ANEMIA: ICD-10-CM

## 2022-03-08 DIAGNOSIS — F41.9 ANXIETY: ICD-10-CM

## 2022-03-08 DIAGNOSIS — G89.29 CHRONIC LEFT-SIDED LOW BACK PAIN WITHOUT SCIATICA: ICD-10-CM

## 2022-03-08 DIAGNOSIS — K63.89 COLONIC MASS: Primary | ICD-10-CM

## 2022-03-08 PROCEDURE — 99214 OFFICE O/P EST MOD 30 MIN: CPT | Performed by: INTERNAL MEDICINE

## 2022-03-08 RX ORDER — LOPERAMIDE HYDROCHLORIDE 2 MG/1
CAPSULE ORAL
COMMUNITY
Start: 2022-02-28 | End: 2022-04-20

## 2022-03-08 RX ORDER — OXYCODONE HYDROCHLORIDE 10 MG/1
10 TABLET ORAL
Qty: 90 TABLET | Refills: 0 | Status: SHIPPED | OUTPATIENT
Start: 2022-03-16 | End: 2022-03-31

## 2022-03-08 RX ORDER — OXYCODONE HYDROCHLORIDE 10 MG/1
10 TABLET ORAL
Qty: 90 TABLET | Refills: 0 | Status: SHIPPED | OUTPATIENT
Start: 2022-03-30 | End: 2022-04-15 | Stop reason: SDUPTHER

## 2022-03-08 NOTE — PROGRESS NOTES
Chief Complaint   Patient presents with   Brigida Gusman is a pleasant 42year old male who presents as a follow up.  He low back pain

## 2022-03-08 NOTE — LETTER
3/8/2022    Patient: Lucinda Luna. YOB: 1975   Date of Visit: 3/8/2022     Dawit Carlson MD  [address] [address]  Via Fax: [phone]     Lizy Sarmiento MD  [facility] [address]  Via Fax: [phone]    Dear MD Lizy Thacker MD,      Thank you for your care of . Lorenza Zelaya. I am seeing him at  [address] [facility]. My notes for this consultation are attached. If you have questions, please do not hesitate to call me. I look forward to following your patient along with you.       Sincerely,    Su Novak NP

## 2022-03-08 NOTE — PROGRESS NOTES
[facility]   Oncology Social Work  Encounter     Patient Name:  Lianne Aden Sr.    Medical History: colon mass    Advance Directives: none on file; conversation     Narrative: Met with patient to reintroduce social role and support. Patient diagnosed with new colon mass; pathology pending. He's here with his girlfriend, [facility]. They have a 15 month old son named Ananda Muñoz. Patient also have two adult children (ages 25 and 21). Patient was working full-time at [employer] as a manager. Currently off on leave due to recent hospitalization. He has not benefits with his employer. Advised we can explore financial assistance options once pathology is back. Patient voiced understanding. Barriers to Care: financial     Plan:  1. Follow-up with patient regarding financial resources. Referral/Handouts:      Thank you,  Venice Hart LCSW

## 2022-03-08 NOTE — PATIENT INSTRUCTIONS
Dear Gera Keene. ,    It was a pleasure seeing you today in [facility] [address]. We will see you again in 4 to 5 weeks to coordinate with your return to see Dr. Martin Galdamez. If labs or imaging tests have been ordered for you today, please call the office  at [phone] 48 hours after completion to obtain the results. Your stated goal:     Your described symptoms were: Fatigue: 5 Drowsiness: 4   Depression: 0 Pain: 6   Anxiety: 8 Nausea: 0   Anorexia: 2 Dyspnea: 0   Best Well-Bein Constipation: No   Other Problem (Comment): 0       This is the plan we talked about:    1. Abdominal pain  -Continue oxycodone 10-mg every 4 hours as needed  -You take 4 to 5 pills a day which is working well for you    2. Depression/anxiety  -Continue Lexapro 20-mg daily  -I'm avoiding benzodiazepines due to synergistic effect with opioids    3. Poor appetite/weight loss  -You're eating and maintaining your weight     4. Colon mass  -You saw Dr. Martin Galdamez today and will see her again in 4 to 5 weeks to review your biopsy results and further treatment if needed    This is what you have shared with us about Advance Care Planning:      Primary Decision Maker: [de-identified] - Ex-Spouse - [phone]  Advance Care Planning 3/2/2022   Patient's [address] is: Legal Next [facility] Directive None   Patient Would Like to Complete Advance Directive -   Does the patient have other document types -           The Palliative Medicine Team is here to support you and your family.        Sincerely,      Alf Gomez MD and the Palliative Medicine Team

## 2022-03-08 NOTE — PROGRESS NOTES
Palliative Medicine Office Visit  Palliative Medicine Nurse Check In  [phone])    Patient Name: Marci Barr. YOB: 1975      Date of Office Visit: 3/8/2022    Patient states: \"  \"    From Check In Sheet (scanned in Media):  Has a medical provider talked with you about cardiopulmonary resuscitation (CPR)? [] Yes   [x] No   [] Unable to obtain    Nurse reminder to complete or update ACP FlowSheet:    Is ACP on the Problem List?    [x] Yes    [] No  IF ACP Document is ON FILE; Nurse to place ACP on Problem List     Is there an ACP Note in Chart Review/Note? [x] Yes    [] No   If NO: ALERT PROVIDER         Is there anything that we should know about you as a person in order to provide you the best care possible? Have you been to the ER, urgent care clinic since your last visit? [] Yes   [x] No   [] Unable to obtain    Have you been hospitalized since your last visit? [] Yes   [x] No   [] Unable to obtain    Have you seen or consulted any other health care providers outside of the [address] [place] [address] since your last visit?    [] Yes   [x] No   [] Unable to obtain    Functional status (describe):         Last BM:      accessed (date): 3/8/2022    Bottle review (for opioid pain medication):  Medication 1: Oxycodone   Date filled:   Directions:   # filled:   # left: 48  # pills taking per day:  Last dose taken:    Medication 2:   Date filled:   Directions:   # filled:   # left:   # pills taking per day:  Last dose taken:    Medication 3:   Date filled:   Directions:   # filled:   # left:   # pills taking per day:  Last dose taken:    Medication 4:   Date filled:   Directions:   # filled:   # left:   # pills taking per day:  Last dose taken:

## 2022-03-10 DIAGNOSIS — C18.4 MALIGNANT NEOPLASM OF TRANSVERSE COLON (HCC): Primary | ICD-10-CM

## 2022-03-15 ENCOUNTER — HOSPITAL ENCOUNTER (OUTPATIENT)
Dept: INFUSION THERAPY | Age: 47
Discharge: HOME | End: 2022-03-15
Payer: COMMERCIAL

## 2022-03-15 ENCOUNTER — TELEPHONE (OUTPATIENT)
Dept: SURGERY | Age: 47
End: 2022-03-15

## 2022-03-15 VITALS
TEMPERATURE: 97.9 F | DIASTOLIC BLOOD PRESSURE: 80 MMHG | RESPIRATION RATE: 18 BRPM | SYSTOLIC BLOOD PRESSURE: 119 MMHG | HEART RATE: 78 BPM | OXYGEN SATURATION: 98 %

## 2022-03-15 DIAGNOSIS — D50.0 IRON DEFICIENCY ANEMIA DUE TO CHRONIC BLOOD LOSS: Primary | ICD-10-CM

## 2022-03-15 PROCEDURE — 74011250636 HC RX REV CODE- 250/636: Performed by: NURSE PRACTITIONER

## 2022-03-15 PROCEDURE — 77030012965 HC NDL HUBR BBMI -A

## 2022-03-15 PROCEDURE — 74011000258 HC RX REV CODE- 258: Performed by: NURSE PRACTITIONER

## 2022-03-15 PROCEDURE — 96365 THER/PROPH/DIAG IV INF INIT: CPT

## 2022-03-15 RX ADMIN — IRON SUCROSE 200 MG: 20 INJECTION, SOLUTION INTRAVENOUS at 11:04

## 2022-03-15 NOTE — PROGRESS NOTES
1140 Pt arrived at [facility] ambulatory and in no acute distress for Venofer 1/5. Discussed infusion, purpose, possible side effects; pt verbalizes understanding. Right chest port accessed without difficulty, brisk blood return. Patient Vitals for the past 12 hrs:   Temp Pulse Resp BP SpO2   03/15/22 1154 -- 78 -- 119/80 --   03/15/22 1017 97.9 °F (36.6 °C) 84 18 129/83 98 %     No results found for this or any previous visit (from the past 12 hour(s)). Medications Administered     iron sucrose (VENOFER) 200 mg in 0.9% sodium chloride 100 mL, overfill volume 10 mL IVPB     Admin Date  03/15/2022 Action  New Bag Dose  200 mg Rate  240 mL/hr Route  IntraVENous Administered By  Aleks Moreno, RN                1325 Pt tolerated infusion well, no adverse reaction noted. D/Cd from [facility] ambulatory and in no acute distress. Next appointment 03/17/2022.     Future Appointments   Date Time Provider Dahlia Epps   3/17/2022 11:30 AM SS INF1 CH4 <2H [facility]   3/21/2022  1:00 PM SS INF2 CH4 <2H [facility]   3/23/2022  1:00 PM SS INF2 CH4 <2H [facility]   3/25/2022  1:00 PM SS INF1 CH4 <2H [facility]   4/15/2022 10:30 AM Tata Bowers MD [facility] AM[facility]   6/27/2022 10:00 AM Luis A Coon DPM [facility] AMB

## 2022-03-16 ENCOUNTER — DOCUMENTATION ONLY (OUTPATIENT)
Dept: ONCOLOGY | Age: 47
End: 2022-03-16

## 2022-03-16 NOTE — PROGRESS NOTES
I called and spoke with  Damion Powers. I advised that he see Dr. Lisha Avina in [place]. I reviewed my discussion with Dr. Hansel Pizarro (Trauma Surgeon at [facility]) who stated that GI tumor board there did not recommend surgical resection given appearance of soft tissue thickening/mass near aorta. 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 Pt voiced understanding and states he will return call to Dr. Nilda Croft office to schedule appt. I also called Radiology to check on status of images from [facility] 2/24/22 getting pushed into PACS. These images are not yet viewable and Dr. Chico Mason said he will check with file room.

## 2022-03-17 ENCOUNTER — HOSPITAL ENCOUNTER (OUTPATIENT)
Dept: INFUSION THERAPY | Age: 47
Discharge: HOME | End: 2022-03-17
Payer: COMMERCIAL

## 2022-03-17 VITALS
WEIGHT: 143.7 LBS | HEART RATE: 86 BPM | HEIGHT: 67 IN | OXYGEN SATURATION: 100 % | DIASTOLIC BLOOD PRESSURE: 96 MMHG | RESPIRATION RATE: 18 BRPM | SYSTOLIC BLOOD PRESSURE: 136 MMHG | TEMPERATURE: 98.2 F | BODY MASS INDEX: 22.55 KG/M2

## 2022-03-17 DIAGNOSIS — D50.0 IRON DEFICIENCY ANEMIA DUE TO CHRONIC BLOOD LOSS: Primary | ICD-10-CM

## 2022-03-17 PROCEDURE — 74011000250 HC RX REV CODE- 250: Performed by: NURSE PRACTITIONER

## 2022-03-17 PROCEDURE — 74011000258 HC RX REV CODE- 258: Performed by: NURSE PRACTITIONER

## 2022-03-17 PROCEDURE — 77030012965 HC NDL HUBR BBMI -A

## 2022-03-17 PROCEDURE — 74011250636 HC RX REV CODE- 250/636: Performed by: NURSE PRACTITIONER

## 2022-03-17 PROCEDURE — 96374 THER/PROPH/DIAG INJ IV PUSH: CPT

## 2022-03-17 RX ORDER — SODIUM CHLORIDE 0.9 % (FLUSH) 0.9 %
10 SYRINGE (ML) INJECTION AS NEEDED
Status: DISCONTINUED | OUTPATIENT
Start: 2022-03-17 | End: 2022-03-18 | Stop reason: HOSPADM

## 2022-03-17 RX ORDER — HEPARIN 100 UNIT/ML
300-500 SYRINGE INTRAVENOUS AS NEEDED
Status: DISCONTINUED | OUTPATIENT
Start: 2022-03-17 | End: 2022-03-18 | Stop reason: HOSPADM

## 2022-03-17 RX ORDER — SODIUM CHLORIDE 9 MG/ML
10 INJECTION INTRAMUSCULAR; INTRAVENOUS; SUBCUTANEOUS AS NEEDED
Status: DISCONTINUED | OUTPATIENT
Start: 2022-03-17 | End: 2022-03-18 | Stop reason: HOSPADM

## 2022-03-17 RX ADMIN — IRON SUCROSE 200 MG: 20 INJECTION, SOLUTION INTRAVENOUS at 12:42

## 2022-03-17 RX ADMIN — SODIUM CHLORIDE, PRESERVATIVE FREE 10 ML: 5 INJECTION INTRAVENOUS at 13:05

## 2022-03-17 RX ADMIN — Medication 500 UNITS: at 13:05

## 2022-03-17 RX ADMIN — SODIUM CHLORIDE, PRESERVATIVE FREE 10 ML: 5 INJECTION INTRAVENOUS at 12:05

## 2022-03-17 NOTE — PROGRESS NOTES
Outpatient Infusion Center Short Visit Progress Note    Patient admitted to [address] [place] [address] for Venofer Infusion ambulatory in stable condition. Assessment completed. No new concerns voiced. Right port accessed with blood return. Vital Signs:  Visit Vitals  /89   Pulse 86   Temp 98.2 °F (36.8 °C)   Resp 18   Ht 5' 7\" (1.702 m)   Wt 65.2 kg (143 lb 11.2 oz)   SpO2 100%   BMI 22.51 kg/m²       Medications:  Medications Administered     iron sucrose (VENOFER) 200 mg in 0.9% sodium chloride 100 mL, overfill volume 10 mL IVPB     Admin Date  03/17/2022 Action  New Bag Dose  200 mg Rate  440 mL/hr Route  IntraVENous Administered By  Cabrera Baker                Patient tolerated treatment well. Patient discharged from [place] [address] ambulatory in no distress at 1315. Patient aware of next appointment.     Future Appointments   Date Time Provider Dahlia Grissomi   3/21/2022  1:00 PM [facility]   3/23/2022  1:00 [facility]   3/25/2022  1:00 PM [facility]   4/15/2022 10:30 AM Sotero Bowers MD [facility] AMB   6/27/2022 10:00 AM Luis A Coon DPM [facility] BS AMB

## 2022-03-18 PROBLEM — K63.89 HEPATIC FLEXURE MASS: Status: ACTIVE | Noted: 2022-02-20

## 2022-03-18 PROBLEM — F41.9 ANXIETY: Status: ACTIVE | Noted: 2019-01-09

## 2022-03-18 PROBLEM — I25.10 CORONARY ARTERY DISEASE INVOLVING NATIVE CORONARY ARTERY: Status: ACTIVE | Noted: 2021-09-02

## 2022-03-19 PROBLEM — L85.9 HYPERKERATOSIS: Status: ACTIVE | Noted: 2020-10-27

## 2022-03-19 PROBLEM — Z86.74 HISTORY OF CARDIAC ARREST: Status: ACTIVE | Noted: 2021-09-02

## 2022-03-19 PROBLEM — C82.90 FOLLICULAR LYMPHOMA (HCC): Status: ACTIVE | Noted: 2018-11-16

## 2022-03-19 PROBLEM — D50.0 IRON DEFICIENCY ANEMIA DUE TO CHRONIC BLOOD LOSS: Status: ACTIVE | Noted: 2022-02-16

## 2022-03-20 PROBLEM — M54.50 CHRONIC LEFT-SIDED LOW BACK PAIN WITHOUT SCIATICA: Status: ACTIVE | Noted: 2019-09-05

## 2022-03-20 PROBLEM — I72.9 PSEUDOANEURYSM (HCC): Status: ACTIVE | Noted: 2019-02-26

## 2022-03-20 PROBLEM — G89.29 CHRONIC LEFT-SIDED LOW BACK PAIN WITHOUT SCIATICA: Status: ACTIVE | Noted: 2019-09-05

## 2022-03-21 ENCOUNTER — HOSPITAL ENCOUNTER (OUTPATIENT)
Dept: INFUSION THERAPY | Age: 47
Discharge: HOME | End: 2022-03-21
Payer: COMMERCIAL

## 2022-03-21 ENCOUNTER — APPOINTMENT (OUTPATIENT)
Dept: INFUSION THERAPY | Age: 47
End: 2022-03-21

## 2022-03-21 VITALS
OXYGEN SATURATION: 100 % | DIASTOLIC BLOOD PRESSURE: 87 MMHG | RESPIRATION RATE: 18 BRPM | HEART RATE: 99 BPM | SYSTOLIC BLOOD PRESSURE: 121 MMHG | TEMPERATURE: 98.4 F

## 2022-03-21 DIAGNOSIS — D50.0 IRON DEFICIENCY ANEMIA DUE TO CHRONIC BLOOD LOSS: Primary | ICD-10-CM

## 2022-03-21 PROCEDURE — 74011000250 HC RX REV CODE- 250: Performed by: NURSE PRACTITIONER

## 2022-03-21 PROCEDURE — 74011000258 HC RX REV CODE- 258: Performed by: NURSE PRACTITIONER

## 2022-03-21 PROCEDURE — 96374 THER/PROPH/DIAG INJ IV PUSH: CPT

## 2022-03-21 PROCEDURE — 74011250636 HC RX REV CODE- 250/636: Performed by: NURSE PRACTITIONER

## 2022-03-21 PROCEDURE — 77030016057 HC NDL HUBR APOL -B

## 2022-03-21 RX ORDER — HEPARIN 100 UNIT/ML
300-500 SYRINGE INTRAVENOUS AS NEEDED
Status: DISCONTINUED | OUTPATIENT
Start: 2022-03-21 | End: 2022-03-22 | Stop reason: HOSPADM

## 2022-03-21 RX ORDER — SODIUM CHLORIDE 0.9 % (FLUSH) 0.9 %
10 SYRINGE (ML) INJECTION AS NEEDED
Status: DISCONTINUED | OUTPATIENT
Start: 2022-03-21 | End: 2022-03-22 | Stop reason: HOSPADM

## 2022-03-21 RX ORDER — SODIUM CHLORIDE 9 MG/ML
10 INJECTION INTRAMUSCULAR; INTRAVENOUS; SUBCUTANEOUS AS NEEDED
Status: DISCONTINUED | OUTPATIENT
Start: 2022-03-21 | End: 2022-03-22 | Stop reason: HOSPADM

## 2022-03-21 RX ADMIN — HEPARIN 500 UNITS: 100 SYRINGE at 13:55

## 2022-03-21 RX ADMIN — Medication 10 ML: at 13:55

## 2022-03-21 RX ADMIN — Medication 10 ML: at 13:05

## 2022-03-21 RX ADMIN — IRON SUCROSE 200 MG: 20 INJECTION, SOLUTION INTRAVENOUS at 13:35

## 2022-03-21 NOTE — PROGRESS NOTES
[facility] Progress Note    Date: 2022    Name: Yue Kwok. MRN: [id number]         : 1975    Mr. Kristyn Jackman Arrived ambulatory and in no distress for Day 3/5 Venofer Infusion. Assessment was completed, no acute issues at this time, no new complaints voiced. Right chest wall port accessed without difficulty. Mr. Shyam Ni vitals were reviewed. Visit Vitals  /87   Pulse 99   Temp 98.4 °F (36.9 °C)   Resp 18   SpO2 100%       Medications:  Medications Administered     0.9% sodium chloride injection 10 mL     Admin Date  2022 Action  Given Dose  10 mL Route  IntraVENous Administered By  Ronna Mejia RN          heparin (porcine) pf 300-500 Units     Admin Date  2022 Action  Given Dose  500 Units Route  InterCATHeter Administered By  Ronna Mejia RN          iron sucrose (VENOFER) 200 mg in 0.9% sodium chloride 100 mL, overfill volume 10 mL IVPB     Admin Date  2022 Action  New Bag Dose  200 mg Rate  480 mL/hr Route  IntraVENous Administered By  Ronna Mejia RN          sodium chloride (NS) flush 10 mL     Admin Date  2022 Action  Given Dose  10 mL Route  IntraVENous Administered By  Ronna Mejia RN           Admin Date  2022 Action  Given Dose  10 mL Route  IntraVENous Administered By  Ronna Mejia RN                Mr. Kristyn Jackman tolerated treatment well and was discharged from [place] [address] in stable condition at [phone]. Port de-accessed, flushed & heparinized per protocol. He is to return on 2022 at 1300 for his next appointment.     Dewayne Maldonado RN  2022

## 2022-03-23 ENCOUNTER — TELEPHONE (OUTPATIENT)
Dept: ONCOLOGY | Age: 47
End: 2022-03-23

## 2022-03-23 ENCOUNTER — HOSPITAL ENCOUNTER (OUTPATIENT)
Dept: INFUSION THERAPY | Age: 47
Discharge: HOME | End: 2022-03-23
Payer: COMMERCIAL

## 2022-03-23 VITALS
SYSTOLIC BLOOD PRESSURE: 118 MMHG | RESPIRATION RATE: 16 BRPM | DIASTOLIC BLOOD PRESSURE: 78 MMHG | OXYGEN SATURATION: 99 % | HEIGHT: 67 IN | TEMPERATURE: 97.8 F | HEART RATE: 83 BPM | BODY MASS INDEX: 23.06 KG/M2 | WEIGHT: 146.9 LBS

## 2022-03-23 DIAGNOSIS — D50.0 IRON DEFICIENCY ANEMIA DUE TO CHRONIC BLOOD LOSS: Primary | ICD-10-CM

## 2022-03-23 PROCEDURE — 74011000258 HC RX REV CODE- 258: Performed by: NURSE PRACTITIONER

## 2022-03-23 PROCEDURE — 74011250636 HC RX REV CODE- 250/636: Performed by: NURSE PRACTITIONER

## 2022-03-23 PROCEDURE — 77030016057 HC NDL HUBR APOL -B

## 2022-03-23 PROCEDURE — 96374 THER/PROPH/DIAG INJ IV PUSH: CPT

## 2022-03-23 PROCEDURE — 74011000250 HC RX REV CODE- 250: Performed by: NURSE PRACTITIONER

## 2022-03-23 RX ORDER — SODIUM CHLORIDE 0.9 % (FLUSH) 0.9 %
10 SYRINGE (ML) INJECTION AS NEEDED
Status: DISCONTINUED | OUTPATIENT
Start: 2022-03-23 | End: 2022-03-24 | Stop reason: HOSPADM

## 2022-03-23 RX ORDER — SODIUM CHLORIDE 9 MG/ML
10 INJECTION INTRAMUSCULAR; INTRAVENOUS; SUBCUTANEOUS AS NEEDED
Status: DISCONTINUED | OUTPATIENT
Start: 2022-03-23 | End: 2022-03-24 | Stop reason: HOSPADM

## 2022-03-23 RX ORDER — HEPARIN 100 UNIT/ML
300-500 SYRINGE INTRAVENOUS AS NEEDED
Status: DISCONTINUED | OUTPATIENT
Start: 2022-03-23 | End: 2022-03-24 | Stop reason: HOSPADM

## 2022-03-23 RX ADMIN — Medication 10 ML: at 14:10

## 2022-03-23 RX ADMIN — SODIUM CHLORIDE, PRESERVATIVE FREE 10 ML: 5 INJECTION INTRAVENOUS at 15:01

## 2022-03-23 RX ADMIN — IRON SUCROSE 200 MG: 20 INJECTION, SOLUTION INTRAVENOUS at 14:27

## 2022-03-23 RX ADMIN — HEPARIN 500 UNITS: 100 SYRINGE at 15:01

## 2022-03-23 NOTE — PROGRESS NOTES
[facility] Progress Note    Date: 2022    Name: Shawanda Alexis. MRN: [id number]         : 1975    Mr. Sara Ashby arrived ambulatory and in no distress for C1D11 (4/5) Venofer Infusion. Assessment was completed, no acute issues at this time, no new complaints voiced. Right chest wall port accessed without difficulty and positive blood return noted. Mr. Yossi Hernandez vitals were reviewed. Visit Vitals  /78   Pulse 83   Temp 97.8 °F (36.6 °C)   Resp 16   Ht 5' 7\" (1.702 m)   Wt 66.6 kg (146 lb 14.4 oz)   SpO2 99%   BMI 23.01 kg/m²           Medications:  Medications Administered     0.9% sodium chloride injection 10 mL     Admin Date  2022 Action  Given Dose  10 mL Route  IntraVENous Administered By  Tom Rankin RN          heparin (porcine) pf 300-500 Units     Admin Date  2022 Action  Given Dose  500 Units Route  InterCATHeter Administered By  Tom Rankin RN          iron sucrose (VENOFER) 200 mg in 0.9% sodium chloride 100 mL, overfill volume 10 mL IVPB     Admin Date  2022 Action  New Bag Dose  200 mg Rate  480 mL/hr Route  IntraVENous Administered By  Tom Rankin RN          sodium chloride (NS) flush 10 mL     Admin Date  2022 Action  Given Dose  10 mL Route  IntraVENous Administered By  Tom Rankin RN                Mr. Sara Ashby tolerated treatment well and was discharged from [place] [address] in stable condition at 1505. PAC flushed, heparinized and de-accessed per protocol. He is to return on  at 1300 for his next appointment.     Deandre Parker RN  2022

## 2022-03-23 NOTE — TELEPHONE ENCOUNTER
03/23/22 4:43 PM Patient reports after receiving 4th venofer infusion today he developed numbness in his fingertips. No numbness with prior infusions. Reports it is slowly improving and only in one hand. No lip tingling, tongue swelling, itching, rashes. Advised if improving just monitor, if getting worse can take benadryl. Advised of alarm symptoms and when to go to ER. He verbalized understanding.

## 2022-03-23 NOTE — TELEPHONE ENCOUNTER
Patient called stating he had an iron infusion today, and he just started experiencing numbness in his fingertips. He is requesting a call back in regards to these symptoms. Please advise.  CB#[phone]

## 2022-03-25 ENCOUNTER — HOSPITAL ENCOUNTER (OUTPATIENT)
Dept: INFUSION THERAPY | Age: 47
Discharge: HOME | End: 2022-03-25
Payer: COMMERCIAL

## 2022-03-25 VITALS
WEIGHT: 145.4 LBS | BODY MASS INDEX: 22.82 KG/M2 | TEMPERATURE: 97.9 F | RESPIRATION RATE: 16 BRPM | OXYGEN SATURATION: 99 % | HEART RATE: 88 BPM | HEIGHT: 67 IN | DIASTOLIC BLOOD PRESSURE: 82 MMHG | SYSTOLIC BLOOD PRESSURE: 116 MMHG

## 2022-03-25 DIAGNOSIS — D50.0 IRON DEFICIENCY ANEMIA DUE TO CHRONIC BLOOD LOSS: Primary | ICD-10-CM

## 2022-03-25 PROCEDURE — 74011250636 HC RX REV CODE- 250/636: Performed by: NURSE PRACTITIONER

## 2022-03-25 PROCEDURE — 74011000258 HC RX REV CODE- 258: Performed by: NURSE PRACTITIONER

## 2022-03-25 PROCEDURE — 96374 THER/PROPH/DIAG INJ IV PUSH: CPT

## 2022-03-25 PROCEDURE — 77030016057 HC NDL HUBR APOL -B

## 2022-03-25 PROCEDURE — 74011000250 HC RX REV CODE- 250: Performed by: NURSE PRACTITIONER

## 2022-03-25 RX ORDER — SODIUM CHLORIDE 9 MG/ML
10 INJECTION INTRAMUSCULAR; INTRAVENOUS; SUBCUTANEOUS AS NEEDED
Status: DISCONTINUED | OUTPATIENT
Start: 2022-03-25 | End: 2022-03-26 | Stop reason: HOSPADM

## 2022-03-25 RX ORDER — HEPARIN 100 UNIT/ML
300-500 SYRINGE INTRAVENOUS AS NEEDED
Status: DISCONTINUED | OUTPATIENT
Start: 2022-03-25 | End: 2022-03-26 | Stop reason: HOSPADM

## 2022-03-25 RX ORDER — SODIUM CHLORIDE 0.9 % (FLUSH) 0.9 %
10 SYRINGE (ML) INJECTION AS NEEDED
Status: DISCONTINUED | OUTPATIENT
Start: 2022-03-25 | End: 2022-03-26 | Stop reason: HOSPADM

## 2022-03-25 RX ADMIN — IRON SUCROSE 200 MG: 20 INJECTION, SOLUTION INTRAVENOUS at 13:50

## 2022-03-25 RX ADMIN — HEPARIN 500 UNITS: 100 SYRINGE at 14:39

## 2022-03-25 RX ADMIN — Medication 10 ML: at 14:39

## 2022-03-25 RX ADMIN — Medication 10 ML: at 13:45

## 2022-03-25 NOTE — PROGRESS NOTES
[facility] Progress Note    Date: 2022    Name: Gretchen Millan. MRN: [id number]         : 1975    Mr. John Avalos arrived ambulatory and in no distress for # 5/5 Venofer Regimen. Assessment was completed, no acute issues at this time, no new complaints voiced. Right chest wall port accessed without difficulty, blood return noted. Mr. Naz Del Cid vitals were reviewed. Visit Vitals  /82   Pulse 88   Temp 97.9 °F (36.6 °C)   Resp 16   Ht 5' 7\" (1.702 m)   Wt 66 kg (145 lb 6.4 oz)   SpO2 99%   BMI 22.77 kg/m²         Medications:  Medications Administered     0.9% sodium chloride injection 10 mL     Admin Date  2022 Action  Given Dose  10 mL Route  IntraVENous Administered By  Carly Caruso RN           Admin Date  2022 Action  Given Dose  10 mL Route  IntraVENous Administered By  Carly Caruso RN          heparin (porcine) pf 300-500 Units     Admin Date  2022 Action  Given Dose  500 Units Route  InterCATHeter Administered By  Carly Caruso RN          iron sucrose (VENOFER) 200 mg in 0.9% sodium chloride 100 mL, overfill volume 10 mL IVPB     Admin Date  2022 Action  New Bag Dose  200 mg Rate  480 mL/hr Route  IntraVENous Administered By  Carly Caruso RN                Mr. John Avalos tolerated treatment well and was discharged from [place] [address] in stable condition at 1440, after 30 minutes of post-infusion monitoring for finger numbness, which patient denied. Port de-accessed, flushed & heparinized per protocol.      Berlin Barry RN  2022

## 2022-03-25 NOTE — PROGRESS NOTES
[id number] [facility] at [facility]  [phone]    Hematology / Oncology Established Visit    Reason for Visit:   April Rishi is a 42 y.o. male who is seen for urgent follow up of colon mass, h/o lymphoma. Hematology Oncology Treatment History:     Diagnosis #1: Follicular lymphoma  Stage: IV  Pathology:   11/13/18 right inguinal LN excision: Follicular lymphoma, high-grade (grade 3a of 3). Prior Treatment:   1. Obinutuzumab-CHOP. Obinutuzumab: 1000 mg weekly on days 1, 8, 15 for cycle 1, then 1000 mg on day 1 q21 days for cycles 2-6, then monotherapy 1000 mg every 21 days for cycle 7, 8 with Cyclophosphamide 750mg/m2, Doxorubicin 50mg/m2, Vincristine 1.4mg/m2 on day 1 and Prednisone 100mg on Days 1-5, every 21 days for a total of 2 cycles completed late 1/2019. Regimen discontinued due to STEMI. 2. Obinutuzumab + Bendamustine: 1000 mg Obinutuzumab on day 1 + Bendamustine 90mg/m2 on days 1-2 on a 28-day cycle x 4 cycles, completed 5/2019  Current Treatment: Surveillance      Diagnosis #2: Colon cancer:  Stage: pending  Pathology:  Ascending colon; biopsy: poorly differentiated carcinoma  Comment: No dysplasia is identified. Immunohistochemical stains performed on block 1A, show the tumor positive for Cam5.2 and shows patchy positivity for CDX2 with a Ki-67 index of -90%; the tumor is focally positive for CK5/6 and GATA3; negative for p63, Pax8, CK7, CK20, panmelanoma, synaptophysin and chromogranin. The immunophenotypic features are nonspecific but argues somewhat against the following carcinoma: urothelial, neuroendocrine and breast. The immunophenotypic features also argues against melanoma and somewhat against classic colorectal carcinoma. Additional immunohistochemical stains to exclude the possibility of prostate and hepatocellular carcinoma have been   ordered; the results will be reported as an addendum.   MLH1/MSH2/MSH6/PMS2 all intact with no loss of nuclear expression of MMR proteins - no MSI   Addendum: The addendum is issued to report the results of additional immunohistochemical stains in an attempt to ascertain the primary site of the carcinoma. The diagnosis is unchanged. Hepar and glypican 3 immunohistochemical stains are neg, arguing against hepatocellular carcinoma. PSA stain is negative, arguing against prostatic primary. Imaging studies to eval for poss primary sites maybe useful. In the absence of carcinoma/tumor at any other sites, this may represent an undifferentiated carcinoma of the colon. Oncogenomics (molecular) studies has been ordered. Results to be reported in addendum. Prior Treatment: Loop ileostomy 2/19/22  Current Treatment: planned for R hemicolectomy, ileostomy reversal 4/27/22       Oncologic History:  Was in his usual state of health in early November 2018 when he developed low back pain and presented to the ER. Work-up at outside hospital revealed a retroperitoneal mass seen on CT imaging, and he was transferred to [facility] for further work-up. CT there showed a large retroperitoneal mass encircling the aorta with invasion of the left renal hilum and left adrenal gland. There were bilateral inguinal lymph nodes and moderate left hydronephrosis. He was evaluated while at [facility] and was noted to have palpable nodes in his groin for approximately the past 1 month. He underwent excisional LN biopsy of right inguinal LN, which revealed follicular lymphoma. At time of diagnosis, he had no cardiac disease aside from HTN, hyperlipidemia. However, he did have an NSTEMI after cycle 2 of O-CHOP. Was likely unrelated to chemotherapy, but opted to switch treatment to Obinutuzumab-Bendamustine. He completed treatment in 5/2019 and had a CR based on PET. History of Present Illness:   Mr. Prema Causey is a 42 y.o. male with prior h/o follicular lymphoma is seen for follow up of colon cancer.  He met with Dr. Haja Joyner and is scheduled for laparoscopic right colectomy and possible extended right colectomy, ileostomy reversal on 4/27/22. Reports constant stabbing pain in RLQ. Managing pain with oxycodone every 4 hours (with Dr. Kanwal Huang). Had ER visit because he thought colostomy was infected, but was told it wasn't. No blood in stool or colostomy bag. Having soft stool output in colostomy bag. Eating and hydrating well. PMHx: Lymphoma in 2018, CAD, HTN, Hyperlipidemia, Anxiety, chronic low back pain  PSurgHx: None aside from cardiac stent placement and port placement  SHx: Smokes 1/2ppd x past 20 yrs. Works part time as a cook. Has 2 children. FHx: Father had leukemia, passed away from pancreatic cancer. Review of Systems: A complete review of systems was obtained, negative except as described above. Physical Exam:     Visit Vitals  /83   Pulse 86   Resp 16   Ht 5' 7\" (1.702 m)   Wt 141 lb 6.4 oz (64.1 kg)   SpO2 99%   BMI 22.15 kg/m²     ECOG PS: 0  General: Well developed, no acute distress, face mask in place. Eyes: Anicteric sclerae  HENT: Atraumatic, OP clear  Neck: Supple  Lymphatic: No cervical, supraclavicular, axillary adenopathy  Respiratory: CTAB, normal respiratory effort  CV: Normal rate, regular rhythm, no murmurs, no peripheral edema  GI: Soft, + TTP to RLQ, colostomy in place with green/brown stool, no masses  MS: Normal gait and station. Digits without clubbing or cyanosis. Skin: No rashes, ecchymoses, or petechiae. Normal temperature, turgor, and texture. Neuro/Psych: Alert, oriented. Moves all 4 extremities. Appropriate affect, normal judgment/insight. Results:     Lab Results   Component Value Date/Time    WBC 10.9 03/29/2022 01:22 PM    HGB 12.2 03/29/2022 01:22 PM    HCT 39.3 03/29/2022 01:22 PM    PLATELET 339 86/49/3207 01:22 PM    MCV 83.8 03/29/2022 01:22 PM    ABS.  NEUTROPHILS 6.9 03/29/2022 01:22 PM    Hemoglobin (POC) 15.0 06/05/2009 02:13 PM    Hematocrit (POC) 39 02/14/2019 01:24 PM     Lab Results Component Value Date/Time    Sodium 140 2022 10:48 PM    Potassium 3.9 2022 10:48 PM    Chloride 110 (H) 2022 10:48 PM    CO2 27 2022 10:48 PM    Glucose 115 (H) 2022 10:48 PM    BUN 15 2022 10:48 PM    Creatinine 1.19 2022 10:48 PM    GFR est AA >60 2022 10:48 PM    GFR est non-AA >60 2022 10:48 PM    Calcium 9.0 2022 10:48 PM    Sodium (POC) 136 2019 01:24 PM    Potassium (POC) 3.9 2019 01:24 PM    Chloride (POC) 102 2019 01:24 PM    Glucose (POC) 249 (H) 02/15/2019 10:21 PM    BUN (POC) 14 2019 01:24 PM    Creatinine (POC) 0.9 2019 01:24 PM    Calcium, ionized (POC) 1.24 2019 01:24 PM     Lab Results   Component Value Date/Time    Bilirubin, total 0.1 (L) 2022 10:48 PM    ALT (SGPT) 14 2022 10:48 PM    Alk. phosphatase 112 2022 10:48 PM    Protein, total 7.1 2022 10:48 PM    Albumin 3.3 (L) 2022 10:48 PM    Globulin 3.8 2022 10:48 PM     Lab Results   Component Value Date/Time    Iron 17 (L) 02/15/2022 02:33 PM    TIBC 346 02/15/2022 02:33 PM    Iron % saturation 5 (L) 02/15/2022 02:33 PM    Ferritin 16 (L) 02/15/2022 02:33 PM       No results found for: B12LT, FOL, RBCF  Lab Results   Component Value Date/Time    TSH 1.53 2016 04:40 AM       Lab Results   Component Value Date/Time    Hepatitis A, IgM NONREACTIVE 2018 04:53 PM    Hepatitis B surface Ag <0.10 2018 04:53 PM    Hepatitis B core, IgM NONREACTIVE 2018 04:53 PM       18:       Labs at [facility]:  22: CA 19-9 = 390  22: CEA = 12.3    Imagin/9/18 Abd/pelvis CT: IMPRESSION:  1. Interval development of a large retroperitoneal mass encircling the aorta with invasion of the left renal hilum and left adrenal gland. Several adjacent lymph nodes are seen extending into the peritoneum and underneath the  diaphragmatic natalie. This most likely represents lymphoma.   2. Several new bilateral enlarged inguinal lymph nodes also likely representing lymphoma. 3. Moderate left hydronephrosis with a delayed renal nephrogram related to decreased renal function. This is related to the invasion of the renal hilum. 11/11/18 Chest CT: IMPRESSION:  Trace left pleural effusion. Bilateral lower lobe atelectasis. Large  retroperitoneal mass lesion again demonstrated. PET 12/18/18:  FINDINGS:  HEAD/NECK: Right palatine tonsil intense hypermetabolism, max SUV 18. Multilevel  bilateral cervical adenopathy, with max SUV 12 in a left supraclavicular node. Cerebral evaluation is limited by normal intense activity. CHEST: Solitary hypermetabolic left axillary node, max SUV 11. ABDOMEN/PELVIS: Bulky retroperitoneal mass max SUV 27, with several additional  small active abdomino-pelvic nodes. Bilateral inguinal nodes with max SUV 12 on  the left. SKELETON: No foci of abnormal hypermetabolism in the axial and visualized  appendicular skeleton. IMPRESSION:   1. Right palatine tonsil tumor involvement (Deauville 5). 2. Bilateral cervical delaney involvement (Deauville 5). 3. Left axillary node involvement (Deauville 5). 4. Bulky retroperitoneal lymphoma mass and additional smaller hypermetabolic  abdomino-pelvic nodes (Deauville 5). 5. Bilateral inguinal delaney involvement (Deauville 5). Deauville Five Point Scale  1. No uptake or no residual uptake (when used interim)  2. Slight uptake, but below blood pool (mediastinum)  3. Uptake above mediastinal, but below/equal to uptake in the liver  4. Uptake slightly to moderately higher than liver  5. Markedly increased uptake or any new lesion (on response evaluation)  Each FDG-avid (or previously FDG avid) lesion is rated independently. Reference values:  Mediastinal blood pool: 2.1 SUV  Liver (background): 2.2 SUV    PET/CT 2/05/19:   IMPRESSION:  1. No Foci of Abnormal Hypermetabolism (Deauville 1).   2. Resolved activity in the right palatine tonsil, bilateral cervical nodes,left axillary node, retroperitoneal/abdominal pelvic adenopathy, bilateral inguinal nodes. Echo 2/14/19:  Normal cavity size, wall thickness and systolic function (ejection fraction normal). The muscle mass is normal. The cavity shape is normal. The estimated ejection fraction is 41 - 45%. Abnormal wall motion as described on the wall scoring diagram below. End-systolic volume is normal. Normal left ventricular strain. There is mild (grade 1) left ventricular diastolic dysfunction. Normal left ventricular diastolic pressure. End-diastolic volume is normal.    LE arterial duplex 2/22/19:  There is evidence of left groin pseudoaneurysm noted arising from distal common femoral artery, pseudo lobe measures 2.32cm x 2.58cm and pseudo neck length measuring 0.63cm. There is no evidence of hemodynamically significant left lower extremity arterial obstruction. JACLYN is 1.03 on the right and 1.02 on the left. LE arterial duplex s/p Thrombin Injection to Pseudoaneurysm 2/26/19:  Successful thrombin injection procedure of the left groin with no further flow seen. No evidence of hemodnyamically significant obstruction in the left lower extremity. Left lower extremity arterial duplex performed. Confirmed pseudoaneurysm in left groin with small neck. Following thrombin injection, no further flow seen in the pseudoaneurysm. The left common femoral, profunda femoral, femoral, popliteal, posterior tibial and anterior arteries were imaged. Mainly triphasic flow was seen with no evidence of significantly elevated velocities. Repeat LE arterial duplex 2/27/19:  Continued thrombosed left groin pseudoaneurysm following thrombin injection on 02/26/2019. No flow or color fill is identified. The hematoma measures approximately 2.1 x 2.9 cm in diameter.  The common femoral, deep femoral, femoral, and popliteal arteries are patent with mainly tri-phasic flow and no significant hemodynamically obstruction is noted. Stress 5/31/19:  · Normal stress myocardial perfusion without ischemia or infact at 84% MPHR. Normal LV function. LVEF 60%. · No EKG changes of ischemia at peak exercise. · Normal functional capacity. PET 6/03/19: IMPRESSION: No Foci of Abnormal Hypermetabolism (Deauville 1). -MRI lumbar spine 12/18/19:  Mild disc degenerative change at L3-4 and L4-5. Mild canal stenosis at L3-4 and mild left foraminal stenosis at L4-5. Other less severe degenerative findings are as described above. Continued diminished size of retroperitoneal mass-adenopathy,  with diminished soft tissue density at the left renal hilum. -CT chest/abdomen 12/16/19:  Findings are consistent with interval response to therapy    CT c/a/p 5/28/20: IMPRESSION:  Further contraction of the retroperitoneal mantle and chris mesentery,  compatible with treatment response  Stable left hilar soft tissue mass  Slight increased splaying of the duodenum and proximal jejunum is the result, without obstruction  No evidence for recurrence of lymphoma in the chest, abdomen, or pelvis    CT c/a/p 2/13/22:  FINDINGS:   LOWER THORAX: Dependent atelectasis with otherwise clear lungs. The visualized  heart is normal in size without pericardial effusion. LIVER: No mass. BILIARY TREE: Gallbladder is unremarkable. CBD is not dilated. SPLEEN: Unremarkable. PANCREAS: No mass or ductal dilatation. ADRENALS: Unremarkable. KIDNEYS: Atrophic left kidney with mild left hydronephrosis. Right kidney is  unremarkable with no stone, enhancing mass, or other renal abnormality. STOMACH: Unremarkable. SMALL BOWEL: No dilatation or wall thickening. COLON: Circumferential wall thickening at the hepatic flexure with luminal  narrowing, adjacent mesenteric stranding, and fluid with upstream retention in  the cecum and fecalization of distal ileal contents. No dilation or other wall  thickening. APPENDIX: Unremarkable.   PERITONEUM: Moderate free fluid with no pneumoperitoneum. RETROPERITONEUM: Soft tissue stranding around the celiac and SMA and associated aorta with no discrete mass or adenopathy. Aorta is normal in size without aneurysm or dissection. REPRODUCTIVE ORGANS: Prostate and seminal vesicles appear unremarkable. URINARY BLADDER: No mass or calculus. BONES: No destructive bone lesion. ABDOMINAL WALL: No mass or hernia. ADDITIONAL COMMENTS: N/A  IMPRESSION  1. Annular mass lesion of the right colon with upstream fecal retention  concerning for primary colon neoplasm versus less likely lymphoma. 2. Soft tissue stranding around celiac axis, SMA, and abdominal aorta may  reflect infiltrative lymphoma. 3. Left renal atrophy with left hydronephrosis likely reflecting chronic  proximal ureteral obstruction. 4. Incidentals as above. 2/24/22 CT abd/pelvis at [facility]:  FINDINGS: An enteric tube with sidehole beyond the level of the lower esophageal sphincter is seen looping on itself in the proximal stomach before terminating in the mid stomach. Status post right lower quadrant diverting ileostomy for an obstructing colonic mass. Multiple loops of gas dilated small bowel remain, with a maximal diameter of 5.4 cm whereas previously measured 3.6 cm. Dilute contrast is seen within the lateral left abdominal dilated small bowel. Moderate stool burden and bowel gas opacifies the cecum, measuring 7.3 cm in diameter.    No definite supine evidence of pneumoperitoneum. Lung bases: Limited evaluation of the lung bases demonstrates a cardiac silhouette within normal limits for size. A small bore central venous catheter is seen terminating in the right atrium. No focal consolidation or pleural effusion. 2/24/22 CT abd/pelvis [facility]:  IMPRESSION:  1. Status post right lower quadrant loop ileostomy.  Mild diffuse dilation of small bowel proximal to the ostomy in the lower abdomen and upper pelvis concerning for at least mild to moderate partial bowel obstruction. Relatively decompressed loop ileostomy. 2. Mildly complex pelvic fluid collection in the rectovesical space, decreased in size from prior. 3. Redemonstrated ascending colon mass and findings suspicious for regional metastatic disease. 4. Redemonstrated soft tissue in the retroperitoneum with prominent lymph nodes, similar to prior examination. Differential would include metastasis, retroperitoneal fibrosis. 5. Mild atherosclerosis. 6. Subcentimeter right renal hypodensity, too small to characterize. 7. Severe compression of the left renal vein, similar to prior examination. 8. Additional findings as described above. Bones: No acute osseous abnormality. 2/24/22 CT chest [facility]:  IMPRESSION:  FINAL report. 1. No evidence of metastatic disease to the chest.  2. No enlarged lymph nodes. 3. Increasing volume loss in the lung bases which may be due to atelectasis. 4. CT abdomen and pelvis reported separately.     2/25/22 Colonoscopy at [facility]:  Impression:            - Preparation of the colon was inadequate.                        - Stool in the entire examined colon.                        - Likely malignant completely obstructing tumor at the                         hepatic flexure. Biopsied.                        - Malignant-appearing tumor in the colon. Complications:         No immediate complications. Assessment & Plan:   Baron Nassar is a 42 y.o. male comes in for evaluation and management of lymphoma. 1. Carcinoma of transverse colon:  S/p diverting ileostomy due to large bowel obstruction. Colonoscopy with biopsy on 2/25/22. No obvious metastatic disease on CT imaging. Scheduled for surgical resection with Dr. Anand Flores.   -- 4/27/22 right colectomy with Dr. Anand Flores.   -- f/u molecular studies from [address] [place] [address] after surgery  -- Plan for repeat colonoscopy in 6 months given incomplete colonoscopy.   -- Return in 4 weeks to review pathology from colectomy and discuss if adjuvant chemotherapy is needed. 2. H/o Follicular lymphoma: Grade 3a. Although grade 3a disease is considered more indolent and can be treated like grade 1/2 disease, this patient has indications for treatment: bulky disease encircling the aorta causing symptoms. Bone marrow negative for lymphoma, but was hypercellular. BR better than RCHOP, but based on GALLIUM study, Obinutuzumab-based induction and maintenance prolongs PFS over that seen with rituximab-based therapy. Therefore, I have chosen to treat patient with O-CHOP regimen for 6 cycles, followed by possible maintenance Obinutuzumab. We discussed the risks and benefits of O-CHOP chemotherapy. The potential side effects include, but are not limited to: nausea, vomiting, diarrhea, constipation, Flu-like symptoms, infusion reaction, allergic reaction, flushing, taste changes, increased risk of infection, anemia, fatigue, alopecia, neuropathy, nail changes, cardiac damage, mucositis, myleosuppression, infertility and rarely, death. Patient completed chemoteaching and received a chemotherapy education folder. CR after 2 cycles of O-CHOP, and O-Bendamustine due to cardiac event. Completed treatment 5/2019. PET completed 6/3/19 showed CR. CT 5/28/20 negative for disease. -- Surveillance consists of H&P with labs q6mo. Imaging as clinically indicated. -- Put port removal on hold. 3. Chronic lumbar back pain/anxiety/RLQ: Left lower back pain is chronic/stable and RLQ is likely related to neoplasm/colostomy - currently managing pain on Oxycodone and Lexapro daily. Signed pain contract on 12/28/18 and following with Dr. Wang Perkins. 4. CAD: h/o STEMI 2/14/29 with TOM placed to proximal LAD. Following with Dr. Urbano Zamorano and remains on dual antiplatelet therapy, high dose Lipitor, Metoprolol, ACEI. Received only 2 doses of cardiotoxic chemo, Doxorubicin in early 1/2019. Is overdue for follow up with Dr. Urbano Zamorano.      5. Tobacco abuse: Discussed benefits of quitting smoking again. Previously discussed replacing hand-to-mouth habit with another item such as Twizzlers or SlimJims. 6. HTN: Well controlled on lisinopril. Managed by PCP. 7. Normocytic anemia:  Received iron sucrose x 5 on 3/2022. -- Recheck CBC, iron profile, ferritin in 4 weeks (prior to next visit)    I personally saw and evaluated the patient and performed the key components of medical decision making. The history, physical exam, and documentation were performed by Millie Fierro NP. I reviewed and verified the above documentation and modified it as needed.      Signed By: Josiane Sage MD     April 15, 2022

## 2022-03-29 ENCOUNTER — HOSPITAL ENCOUNTER (EMERGENCY)
Age: 47
Discharge: HOME | End: 2022-03-29
Attending: STUDENT IN AN ORGANIZED HEALTH CARE EDUCATION/TRAINING PROGRAM
Payer: COMMERCIAL

## 2022-03-29 VITALS
HEART RATE: 99 BPM | RESPIRATION RATE: 16 BRPM | DIASTOLIC BLOOD PRESSURE: 79 MMHG | HEIGHT: 67 IN | TEMPERATURE: 97.4 F | OXYGEN SATURATION: 99 % | SYSTOLIC BLOOD PRESSURE: 119 MMHG | BODY MASS INDEX: 22.76 KG/M2 | WEIGHT: 145 LBS

## 2022-03-29 DIAGNOSIS — L03.90 CELLULITIS, UNSPECIFIED CELLULITIS SITE: Primary | ICD-10-CM

## 2022-03-29 LAB
ALBUMIN SERPL-MCNC: 3.5 G/DL (ref 3.5–5)
ALBUMIN/GLOB SERPL: 0.8 {RATIO} (ref 1.1–2.2)
ALP SERPL-CCNC: 108 U/L (ref 45–117)
ALT SERPL-CCNC: 16 U/L (ref 12–78)
ANION GAP SERPL CALC-SCNC: 7 MMOL/L (ref 5–15)
AST SERPL-CCNC: 16 U/L (ref 15–37)
BASOPHILS # BLD: 0.1 K/UL (ref 0–0.1)
BASOPHILS NFR BLD: 1 % (ref 0–1)
BILIRUB SERPL-MCNC: 0.6 MG/DL (ref 0.2–1)
BUN SERPL-MCNC: 14 MG/DL (ref 6–20)
BUN/CREAT SERPL: 13 (ref 12–20)
CALCIUM SERPL-MCNC: 9.8 MG/DL (ref 8.5–10.1)
CHLORIDE SERPL-SCNC: 108 MMOL/L (ref 97–108)
CO2 SERPL-SCNC: 22 MMOL/L (ref 21–32)
CREAT SERPL-MCNC: 1.05 MG/DL (ref 0.7–1.3)
DIFFERENTIAL METHOD BLD: ABNORMAL
EOSINOPHIL # BLD: 0.4 K/UL (ref 0–0.4)
EOSINOPHIL NFR BLD: 4 % (ref 0–7)
ERYTHROCYTE [DISTWIDTH] IN BLOOD BY AUTOMATED COUNT: 16.8 % (ref 11.5–14.5)
GLOBULIN SER CALC-MCNC: 4.3 G/DL (ref 2–4)
GLUCOSE SERPL-MCNC: 80 MG/DL (ref 65–100)
HCT VFR BLD AUTO: 39.3 % (ref 36.6–50.3)
HGB BLD-MCNC: 12.2 G/DL (ref 12.1–17)
IMM GRANULOCYTES # BLD AUTO: 0.1 K/UL (ref 0–0.04)
IMM GRANULOCYTES NFR BLD AUTO: 1 % (ref 0–0.5)
LYMPHOCYTES # BLD: 2.6 K/UL (ref 0.8–3.5)
LYMPHOCYTES NFR BLD: 24 % (ref 12–49)
MCH RBC QN AUTO: 26 PG (ref 26–34)
MCHC RBC AUTO-ENTMCNC: 31 G/DL (ref 30–36.5)
MCV RBC AUTO: 83.8 FL (ref 80–99)
MONOCYTES # BLD: 0.8 K/UL (ref 0–1)
MONOCYTES NFR BLD: 8 % (ref 5–13)
NEUTS SEG # BLD: 6.9 K/UL (ref 1.8–8)
NEUTS SEG NFR BLD: 64 % (ref 32–75)
NRBC # BLD: 0 K/UL (ref 0–0.01)
NRBC BLD-RTO: 0 PER 100 WBC
PLATELET # BLD AUTO: 393 K/UL (ref 150–400)
PMV BLD AUTO: 10.3 FL (ref 8.9–12.9)
POTASSIUM SERPL-SCNC: 4.5 MMOL/L (ref 3.5–5.1)
PROT SERPL-MCNC: 7.8 G/DL (ref 6.4–8.2)
RBC # BLD AUTO: 4.69 M/UL (ref 4.1–5.7)
SODIUM SERPL-SCNC: 137 MMOL/L (ref 136–145)
WBC # BLD AUTO: 10.9 K/UL (ref 4.1–11.1)

## 2022-03-29 PROCEDURE — 99283 EMERGENCY DEPT VISIT LOW MDM: CPT

## 2022-03-29 PROCEDURE — 36415 COLL VENOUS BLD VENIPUNCTURE: CPT

## 2022-03-29 PROCEDURE — 80053 COMPREHEN METABOLIC PANEL: CPT

## 2022-03-29 PROCEDURE — 85025 COMPLETE CBC W/AUTO DIFF WBC: CPT

## 2022-03-29 RX ORDER — CEPHALEXIN 500 MG/1
500 CAPSULE ORAL 4 TIMES DAILY
Qty: 28 CAPSULE | Refills: 0 | Status: SHIPPED | OUTPATIENT
Start: 2022-03-29 | End: 2022-04-05

## 2022-03-29 NOTE — ED PROVIDER NOTES
Chief Complaint   Patient presents with    Other     Colostomy problem     This is a 29-year-old male with a history of follicular lymphoma in remission, recent diagnosis of colorectal cancer now status post ileostomy performed at [facility] about a month ago, presenting with several days of discomfort around his ostomy site where there is a rash. His wife been changing out the ileostomy bag and cleaning the ostomy site on a regular basis as instructed, but the area around the ostomy has become increasingly painful and itchy. He denies any abdominal pain at the site of the stoma or around the area of irritation. Has not had any vomiting or diarrhea but does feel nauseous. Output into the ostomy has been normal.  No other recent changes in his health. Symptoms are moderate in nature without alleviating factors.       Past Medical History:   Diagnosis Date    Anxiety     Cancer [facility])     lymphoma Nov 2018 receiving chemo    Chronic pain     lower back- lymphoma    Hyperlipidemia     Hypertension     Lymphadenopathy 11/12/2018       Past Surgical History:   Procedure Laterality Date    HX HEART CATHETERIZATION  02/2019    HX OTHER SURGICAL  02/2019    cardiac stent    IR INJECTION PSEUDOANEURYSM  2/26/2019         Family History:   Problem Relation Age of Onset    Hypertension Father     Diabetes Father     Diabetes Mother        Social History     Socioeconomic History    Marital status:      Spouse name: Not on file    Number of children: 2    Years of education: 6    Highest education level: 11th grade   Occupational History     Comment: resturant manager,   Tobacco Use    Smoking status: Current Every Day Smoker     Packs/day: 0.50     Years: 20.00     Pack years: 10.00    Smokeless tobacco: Never Used   Vaping Use    Vaping Use: Never used   Substance and Sexual Activity    Alcohol use: No    Drug use: No    Sexual activity: Yes   Other Topics Concern     Service No    Blood Transfusions No    Caffeine Concern Yes    Occupational Exposure No    Hobby Hazards No    Sleep Concern No    Stress Concern No    Weight Concern No    Special Diet No    Back Care No    Exercise No    Bike Helmet No    Seat Belt No    Self-Exams No   Social History Narrative    Not on file     Social Determinants of Health     Financial Resource Strain:     Difficulty of Paying Living Expenses: Not on file   Food Insecurity:     Worried About Running Out of Food in the Last Year: Not on file    Michelle of Food in the Last Year: Not on file   Transportation Needs:     Lack of Transportation (Medical): Not on file    Lack of Transportation (Non-Medical): Not on file   Physical Activity:     Days of Exercise per Week: Not on file    Minutes of Exercise per Session: Not on file   Stress:     Feeling of Stress : Not on file   Social Connections:     Frequency of Communication with Friends and Family: Not on file    Frequency of Social Gatherings with Friends and Family: Not on file    Attends Adventism Services: Not on file    Active Member of [address] [place] [address] [employer] or Organizations: Not on file    Attends Club or Organization Meetings: Not on file    Marital Status: Not on file   Intimate Partner Violence:     Fear of Current or Ex-Partner: Not on file    Emotionally Abused: Not on file    Physically Abused: Not on file    Sexually Abused: Not on file   Housing Stability:     Unable to Pay for Housing in the Last Year: Not on file    Number of [place] in the Last Year: Not on file    Unstable Housing in the Last Year: Not on file         ALLERGIES: Patient has no known allergies. Review of Systems   Constitutional: Negative for fever. HENT: Negative for facial swelling. Eyes: Negative for redness. Respiratory: Negative for shortness of breath. Cardiovascular: Negative for chest pain. Gastrointestinal: Negative for vomiting. Genitourinary: Negative for difficulty urinating. Musculoskeletal: Negative for gait problem. Skin: Positive for rash. Neurological: Negative for headaches. Psychiatric/Behavioral: Negative for confusion. Vitals:    03/29/22 1157   BP: 119/79   Pulse: 99   Resp: 16   Temp: 97.4 °F (36.3 °C)   SpO2: 99%   Weight: 65.8 kg (145 lb)   Height: 5' 7\" (1.702 m)            Physical Exam  General:  Awake and alert, NAD  HEENT:  NC/AT, equal pupils, moist mucous membranes  Neck:   Normal inspection, full range of motion  Cardiac:  RRR, no murmurs  Respiratory:  Clear bilaterally, no wheezes, rales, rhonchi  Abdomen:  Soft and nondistended, +pink viable stoma with mild surrounding erythema and crusting, no purulent drainage, abdomen otherwise nontender to palpation  Extremities: Warm and well perfused, no peripheral edema  Neuro:  Moving all extremities symmetrically without gross motor deficit  Skin:   No rashes or pallor    RESULTS  Recent Results (from the past 12 hour(s))   CBC WITH AUTOMATED DIFF    Collection Time: 03/29/22  1:22 PM   Result Value Ref Range    WBC 10.9 4.1 - 11.1 K/uL    RBC 4.69 4.10 - 5.70 M/uL    HGB 12.2 12.1 - 17.0 g/dL    HCT 39.3 36.6 - 50.3 %    MCV 83.8 80.0 - 99.0 FL    MCH 26.0 26.0 - 34.0 PG    MCHC 31.0 30.0 - 36.5 g/dL    RDW 16.8 (H) 11.5 - 14.5 %    PLATELET 683 783 - 918 K/uL    MPV 10.3 8.9 - 12.9 FL    NRBC 0.0 0  WBC    ABSOLUTE NRBC 0.00 0.00 - 0.01 K/uL    NEUTROPHILS 64 32 - 75 %    LYMPHOCYTES 24 12 - 49 %    MONOCYTES 8 5 - 13 %    EOSINOPHILS 4 0 - 7 %    BASOPHILS 1 0 - 1 %    IMMATURE GRANULOCYTES 1 (H) 0.0 - 0.5 %    ABS. NEUTROPHILS 6.9 1.8 - 8.0 K/UL    ABS. LYMPHOCYTES 2.6 0.8 - 3.5 K/UL    ABS. MONOCYTES 0.8 0.0 - 1.0 K/UL    ABS. EOSINOPHILS 0.4 0.0 - 0.4 K/UL    ABS. BASOPHILS 0.1 0.0 - 0.1 K/UL    ABS. IMM. GRANS. 0.1 (H) 0.00 - 0.04 K/UL    DF AUTOMATED          IMAGING  No results found.     Procedures - none unless documented below      ED course: Some excoriation around the site of the ostomy, may be due to irritation from mild leakage of contents but would be reasonable to cover for early cellulitis with Keflex. Discussed with general surgery Lizzie Melton). At 1320 the patient and his wife declined to wait for his lab work to result understanding the risks associated with leaving prematurely. I later discovered that the CMP was never run, however his CBC did not show any signs of leukocytosis or bandemia. I recommended that they follow up at the colostomy clinic to discuss good local wound care and how to care for the ileostomy site going forward.       Impression: Cellulitis (abdominal wall)  Disposition: Discharge home

## 2022-03-29 NOTE — ED TRIAGE NOTES
Pt states \"I think my colostomy is infected\". Reports pain around stoma, itchiness and irritation with redness starting yesterday. Denies known fevers, denies unusual drainage from stoma.

## 2022-03-29 NOTE — DISCHARGE INSTRUCTIONS
- Continue Keflex for possible early bacterial infection and follow up at the ostomy clinic to discuss ways to help treat irritation from leakage around the stoma  - Return for fevers, severe pain or vomiting, abnormal ostomy output, or any new or worsening symptoms

## 2022-03-31 ENCOUNTER — DOCUMENTATION ONLY (OUTPATIENT)
Dept: SURGERY | Age: 47
End: 2022-03-31

## 2022-03-31 ENCOUNTER — OFFICE VISIT (OUTPATIENT)
Dept: SURGERY | Age: 47
End: 2022-03-31
Payer: COMMERCIAL

## 2022-03-31 VITALS
BODY MASS INDEX: 22.6 KG/M2 | WEIGHT: 144 LBS | SYSTOLIC BLOOD PRESSURE: 113 MMHG | RESPIRATION RATE: 17 BRPM | HEIGHT: 67 IN | TEMPERATURE: 97.6 F | HEART RATE: 86 BPM | DIASTOLIC BLOOD PRESSURE: 79 MMHG | OXYGEN SATURATION: 98 %

## 2022-03-31 DIAGNOSIS — K56.609: Primary | ICD-10-CM

## 2022-03-31 PROCEDURE — 99205 OFFICE O/P NEW HI 60 MIN: CPT | Performed by: SURGERY

## 2022-03-31 NOTE — PROGRESS NOTES
ERAS teaching completed. Patient was given ERAS notebook. Patient does not need pre op medications, therefore they will not be send to pharmacy. Patient has an ileostomy, no bowel prep needed. All questions answered. Patient to call the office with any further questions.

## 2022-03-31 NOTE — PROGRESS NOTES
1. Have you been to the ER, urgent care clinic since your last visit? Hospitalized since your last visit? No    2. Have you seen or consulted any other health care providers outside of the [address] [place] [address] since your last visit? Include any pap smears or colon screening.  No]

## 2022-03-31 NOTE — LETTER
4/4/2022    Patient: Salem Scheuermann Sr. YOB: 1975   Date of Visit: 3/31/2022     Wilber Whittaker MD  [place] [address]  Via Fax: [phone] Shannan Ritchie MD  [address] [place] [address] Arthur Tpkarley  Via In [facility] [address]    Dear MD Jaime Dunbar MD,      Thank you for referring Mr. Phu Nova to [address] for evaluation. My notes for this consultation are attached. If you have questions, please do not hesitate to call me. I look forward to following your patient along with you.       Sincerely,    Staci De Luna MD

## 2022-04-04 NOTE — PROGRESS NOTES
[facility] History and Physical    Chief Complaint: Malignant obstruction of transverse colon    History of Present Illness:      Jose Morales Sr. is a 42 y.o. male who was kindly referred by Dr. Howard Avila for evaluation of a malignant obstruction of the proximal transverse colon. Patient has a history of follicular lymphoma which was treated in 2018 with a complete response. More recently he develops constipation type symptoms and abdominal pain which began in February. He had a CT scan on 2/14/2022 that showed an annular mass causing partial obstruction at the hepatic flexure of the colon. He opted for outpatient work-up and was getting scheduled for colonoscopy but ultimately presented to the [facility] ER with a colonic obstruction. He underwent a laparoscopic diverting loop ileostomy by Dr. Diego Henson on 2/19/22. He was reviewed at the [facility] tumor board and given concern about disease around the aorta was not offered a right colectomy. The patient has done better since his surgery but does continue to have right-sided cramping abdominal pain. He has good output from his ileostomy. No issues with dehydration. The patient states he did have a colonoscopy prior to his surgery at [facility][1] and was told the biopsy came back malignant but not clear as to whether or not this was adenocarcinoma versus lymphoma.     Past Medical History:   Diagnosis Date    Anxiety     Cancer [facility])     lymphoma Nov 2018 receiving chemo    Chronic pain     lower back- lymphoma    Hyperlipidemia     Hypertension     Lymphadenopathy 11/12/2018       Past Surgical History:   Procedure Laterality Date    HX HEART CATHETERIZATION  02/2019    HX ILEOSTOMY      HX OTHER SURGICAL  02/2019    cardiac stent    IR INJECTION PSEUDOANEURYSM  2/26/2019       Social History     Socioeconomic History    Marital status:      Spouse name: Not on file    Number of children: 2    Years of education: 6    Highest education level: 11th grade   Occupational History     Comment: resturant manager,   Tobacco Use    Smoking status: Current Every Day Smoker     Packs/day: 0.50     Years: 20.00     Pack years: 10.00    Smokeless tobacco: Never Used   Vaping Use    Vaping Use: Never used   Substance and Sexual Activity    Alcohol use: No    Drug use: No    Sexual activity: Yes   Other Topics Concern     Service No    Blood Transfusions No    Caffeine Concern Yes    Occupational Exposure No    Hobby Hazards No    Sleep Concern No    Stress Concern No    Weight Concern No    Special Diet No    Back Care No    Exercise No    Bike Helmet No    Seat Belt No    Self-Exams No   Social History Narrative    Not on file     Social Determinants of Health     Financial Resource Strain:     Difficulty of Paying Living Expenses: Not on file   Food Insecurity:     Worried About Running Out of Food in the Last Year: Not on file    Michelle of Food in the Last Year: Not on file   Transportation Needs:     Lack of Transportation (Medical): Not on file    Lack of Transportation (Non-Medical):  Not on file   Physical Activity:     Days of Exercise per Week: Not on file    Minutes of Exercise per Session: Not on file   Stress:     Feeling of Stress : Not on file   Social Connections:     Frequency of Communication with Friends and Family: Not on file    Frequency of Social Gatherings with Friends and Family: Not on file    Attends Shinto Services: Not on file    Active Member of Clubs or Organizations: Not on file    Attends Club or Organization Meetings: Not on file    Marital Status: Not on file   Intimate Partner Violence:     Fear of Current or Ex-Partner: Not on file    Emotionally Abused: Not on file    Physically Abused: Not on file    Sexually Abused: Not on file   Housing Stability:     Unable to Pay for Housing in the Last Year: Not on file    Number of [place] in the Last Year: Not on file    Unstable Housing in the Last Year: Not on file       Family History   Problem Relation Age of Onset    Hypertension Father     Diabetes Father     Diabetes Mother          Current Outpatient Medications:     cephALEXin (Keflex) 500 mg capsule, Take 1 Capsule by mouth four (4) times daily for 7 days. , Disp: 28 Capsule, Rfl: 0    loperamide (IMODIUM) 2 mg capsule, TAKE 1 CAPSULE BY MOUTH 3 TIMES A DAY BEFORE MEALS FOR 10 DAYS., Disp: , Rfl:     traZODone (DESYREL) 50 mg tablet, Take 50 mg by mouth nightly. Take 50 mg nightly, Disp: , Rfl:     potassium chloride (KLOR-CON) 10 mEq tablet, Take 1 Tab by mouth daily. , Disp: 30 Tab, Rfl: 0    escitalopram oxalate (LEXAPRO) 20 mg tablet, TAKE 1 TABLET BY MOUTH EVERY DAY, Disp: 30 Tab, Rfl: 5    metoprolol succinate (TOPROL-XL) 50 mg XL tablet, TAKE 1 TABLET BY MOUTH EVERY DAY, Disp: 90 Tab, Rfl: 0    LORazepam (Ativan) 0.5 mg tablet, Take 1 Tablet by mouth., Disp: , Rfl:     lisinopriL (PRINIVIL, ZESTRIL) 40 mg tablet, TAKE 1 TABLET BY MOUTH EVERY DAY, Disp: 30 Tab, Rfl: 1    atorvastatin (LIPITOR) 40 mg tablet, TAKE 1 TAB BY MOUTH NIGHTLY. INDICATIONS: TREATMENT TO SLOW PROGRESSION OF CORONARY ARTERY DISEASE, Disp: 90 Tab, Rfl: 3    oxyCODONE IR (ROXICODONE) 10 mg tab immediate release tablet, Take 1 Tablet by mouth every four (4) hours as needed for Pain for up to 15 days. Max Daily Amount: 60 mg. (Patient not taking: Reported on 3/31/2022), Disp: 90 Tablet, Rfl: 0    docusate sodium (COLACE) 100 mg capsule, Take 1 Capsule by mouth two (2) times a day for 90 days. (Patient not taking: Reported on 3/2/2022), Disp: 60 Capsule, Rfl: 2    ondansetron (ZOFRAN ODT) 8 mg disintegrating tablet, Take 1 Tablet by mouth every eight (8) hours as needed for Nausea. (Patient not taking: Reported on 3/8/2022), Disp: 12 Tablet, Rfl: 0    aspirin 81 mg chewable tablet, Take 81 mg by mouth daily.  (Patient not taking: Reported on 3/2/2022), Disp: , Rfl:    L. acidoph & paracasei- S therm- Bifido (AUSTIN-Q/RISAQUAD) 8 billion cell cap cap, take 1 cap daily (Patient not taking: Reported on 9/2/2021), Disp: , Rfl:     predniSONE (DELTASONE) 20 mg tablet, take 5 tablet by oral route  every day. Take on Days 1 through 5 each cycle (Patient not taking: Reported on 6/10/2021), Disp: , Rfl:     prochlorperazine (Compazine) 10 mg tablet, Take 1 Tab by mouth. (Patient not taking: Reported on 6/10/2021), Disp: , Rfl:     senna-docusate (PERICOLACE) 8.6-50 mg per tablet, Take 1 Tab by mouth. (Patient not taking: Reported on 3/8/2022), Disp: , Rfl:     ondansetron hcl (ZOFRAN) 4 mg tablet, Take 2 Tabs by mouth every eight (8) hours as needed for Nausea or Vomiting. (Patient not taking: Reported on 3/8/2022), Disp: 30 Tab, Rfl: 0    naloxone (NARCAN) 4 mg/actuation nasal spray, Use 1 spray intranasally, then discard. Repeat with new spray every 2 min as needed for opioid overdose symptoms, alternating nostrils. (Patient not taking: Reported on 9/2/2021), Disp: 1 Each, Rfl: 0    clopidogrel (PLAVIX) 75 mg tab, 1 Tab by Per NG tube route daily. (Patient not taking: Reported on 3/2/2022), Disp: 30 Tab, Rfl: 5    No Known Allergies    ROS   Constitutional: Negative  Ears, Nose, Mouth, Throat, and Face: Negative  Respiratory: Negative  Cardiovascular: Negative  Gastrointestinal: As in HPI  Genitourinary: Negative  Integument/Breast: Negative  Hematologic/Lymphatic: Negative  Behavioral/Psychiatric: Negative  Allergic/Immunologic: Negative      Physical Exam:     Visit Vitals  /79 (BP 1 Location: Left upper arm, BP Patient Position: Sitting, BP Cuff Size: Adult)   Pulse 86   Temp 97.6 °F (36.4 °C) (Oral)   Resp 17   Ht 5' 7\" (1.702 m)   Wt 144 lb (65.3 kg)   SpO2 98%   BMI 22.55 kg/m²       General - alert and oriented, no apparent distress  HEENT - NC/AT. No scleral icterus  Pulm - CTAB, normal inspiratory effort  CV - RRR, no M/R/G  Abd - soft, NT, ND.   Right sided loop ileostomy is pink, healthy and productive. Ext - warm, well perfused, no edema  Lymphatics - no cervical, supraclavicular or inguinal adenopathy noted. Skin - supple, no rashes  Psychiatric - normal affect, good mood    Labs  Lab Results   Component Value Date/Time    WBC 10.9 03/29/2022 01:22 PM    Hemoglobin (POC) 15.0 06/05/2009 02:13 PM    HGB 12.2 03/29/2022 01:22 PM    Hematocrit (POC) 39 02/14/2019 01:24 PM    HCT 39.3 03/29/2022 01:22 PM    PLATELET 319 13/85/0921 01:22 PM    MCV 83.8 03/29/2022 01:22 PM     Lab Results   Component Value Date/Time    Sodium 137 03/29/2022 01:22 PM    Potassium 4.5 03/29/2022 01:22 PM    Chloride 108 03/29/2022 01:22 PM    CO2 22 03/29/2022 01:22 PM    Anion gap 7 03/29/2022 01:22 PM    Glucose 80 03/29/2022 01:22 PM    BUN 14 03/29/2022 01:22 PM    Creatinine 1.05 03/29/2022 01:22 PM    BUN/Creatinine ratio 13 03/29/2022 01:22 PM    GFR est AA >60 03/29/2022 01:22 PM    GFR est non-AA >60 03/29/2022 01:22 PM    Calcium 9.8 03/29/2022 01:22 PM    Bilirubin, total 0.6 03/29/2022 01:22 PM    Alk. phosphatase 108 03/29/2022 01:22 PM    Protein, total 7.8 03/29/2022 01:22 PM    Albumin 3.5 03/29/2022 01:22 PM    Globulin 4.3 (H) 03/29/2022 01:22 PM    A-G Ratio 0.8 (L) 03/29/2022 01:22 PM    ALT (SGPT) 16 03/29/2022 01:22 PM    AST (SGOT) 16 03/29/2022 01:22 PM     CEA: 10.2    Imaging  CT Results (most recent):  Results from Hospital Encounter encounter on 02/13/22    CT ABD PELV W CONT    Narrative  EXAM: CT ABD PELV W CONT    INDICATION: abd pain    COMPARISON: Ultrasound 2/13/2022. CONTRAST: 100 mL of Isovue-370. TECHNIQUE:  Following the uneventful intravenous administration of contrast, thin axial  images were obtained through the abdomen and pelvis. Coronal and sagittal  reconstructions were generated. Oral contrast was not administered.  CT dose  reduction was achieved through use of a standardized protocol tailored for this  examination and automatic exposure control for dose modulation. FINDINGS:  LOWER THORAX: Dependent atelectasis with otherwise clear lungs. The visualized  heart is normal in size without pericardial effusion. LIVER: No mass. BILIARY TREE: Gallbladder is unremarkable. CBD is not dilated. SPLEEN: Unremarkable. PANCREAS: No mass or ductal dilatation. ADRENALS: Unremarkable. KIDNEYS: Atrophic left kidney with mild left hydronephrosis. Right kidney is  unremarkable with no stone, enhancing mass, or other renal abnormality. STOMACH: Unremarkable. SMALL BOWEL: No dilatation or wall thickening. COLON: Circumferential wall thickening at the hepatic flexure with luminal  narrowing, adjacent mesenteric stranding, and fluid with upstream retention in  the cecum and fecalization of distal ileal contents. No dilation or other wall  thickening. APPENDIX: Unremarkable. PERITONEUM: Moderate free fluid with no pneumoperitoneum. RETROPERITONEUM: Soft tissue stranding around the celiac and SMA and associated  aorta with no discrete mass or adenopathy. Aorta is normal in size without  aneurysm or dissection. REPRODUCTIVE ORGANS: Prostate and seminal vesicles appear unremarkable. URINARY BLADDER: No mass or calculus. BONES: No destructive bone lesion. ABDOMINAL WALL: No mass or hernia. ADDITIONAL COMMENTS: N/A    Impression  1. Annular mass lesion of the right colon with upstream fecal retention  concerning for primary colon neoplasm versus less likely lymphoma. 2. Soft tissue stranding around celiac axis, SMA, and abdominal aorta may  reflect infiltrative lymphoma. 3. Left renal atrophy with left hydronephrosis likely reflecting chronic  proximal ureteral obstruction. 4. Incidentals as above. I have reviewed and agree with all of the pertinent images    Assessment:     David Grover Sr. is a 42 y.o. male with a mass right/transverse colon at the hepatic that it appears suspicious for a primary colonic malignancy.   Recurrent lymphoma not ruled out. Recommendations:     1. Imaging was reviewed at our multidisciplinary tumor board. The area of concern around his aorta is in the location of his previous lymphoma and this appears improved over sequential imaging including after completion of therapy. The mass in the hepatic flexure is new and worrisome for a new malignant process and recommendation was made for resection of this tumor. I recommended a laparoscopic right colectomy, possible extended right colectomy with ileostomy takedown and primary anastomosis. We discussed the possibility of not taking down the ileostomy depending on the appearance of the colon. We discussed the possible need to convert to open as well. ERAS education provided. He will not need a bowel prep given his diverting proximal ileostomy. A complete discussion of the risks, benefits and alternatives to surgery were discussed with the patient who was keen to proceed. 60 mins of time was spent with the patient of which > 50% of the time involved face-to-face counseling of the patient regarding the proposed treatment plan.       Roula Nunez MD  3/31/2022    CC: MD Dr. Beckie Trimble

## 2022-04-15 ENCOUNTER — OFFICE VISIT (OUTPATIENT)
Dept: ONCOLOGY | Age: 47
End: 2022-04-15
Payer: COMMERCIAL

## 2022-04-15 VITALS
WEIGHT: 141.4 LBS | SYSTOLIC BLOOD PRESSURE: 121 MMHG | HEIGHT: 67 IN | HEART RATE: 86 BPM | BODY MASS INDEX: 22.19 KG/M2 | DIASTOLIC BLOOD PRESSURE: 83 MMHG | OXYGEN SATURATION: 99 % | RESPIRATION RATE: 16 BRPM

## 2022-04-15 DIAGNOSIS — D50.0 IRON DEFICIENCY ANEMIA DUE TO CHRONIC BLOOD LOSS: ICD-10-CM

## 2022-04-15 DIAGNOSIS — R10.84 ABDOMINAL PAIN, GENERALIZED: ICD-10-CM

## 2022-04-15 DIAGNOSIS — Z85.72 HISTORY OF FOLLICULAR LYMPHOMA: ICD-10-CM

## 2022-04-15 DIAGNOSIS — C18.4 CARCINOMA OF TRANSVERSE COLON (HCC): Primary | ICD-10-CM

## 2022-04-15 DIAGNOSIS — Z71.6 TOBACCO ABUSE COUNSELING: ICD-10-CM

## 2022-04-15 DIAGNOSIS — C18.3 MALIGNANT NEOPLASM OF HEPATIC FLEXURE (HCC): ICD-10-CM

## 2022-04-15 DIAGNOSIS — R10.31 RLQ ABDOMINAL PAIN: ICD-10-CM

## 2022-04-15 DIAGNOSIS — I25.10 CORONARY ARTERY DISEASE INVOLVING NATIVE CORONARY ARTERY OF NATIVE HEART WITHOUT ANGINA PECTORIS: ICD-10-CM

## 2022-04-15 PROCEDURE — 99214 OFFICE O/P EST MOD 30 MIN: CPT | Performed by: INTERNAL MEDICINE

## 2022-04-15 RX ORDER — OXYCODONE HYDROCHLORIDE 10 MG/1
10 TABLET ORAL
Qty: 90 TABLET | Refills: 0 | Status: SHIPPED | OUTPATIENT
Start: 2022-04-15 | End: 2022-04-18 | Stop reason: SDUPTHER

## 2022-04-15 RX ORDER — OXYCODONE HYDROCHLORIDE 10 MG/1
10 TABLET ORAL
Qty: 90 TABLET | Refills: 0 | Status: CANCELLED | OUTPATIENT
Start: 2022-04-15 | End: 2022-04-30

## 2022-04-15 NOTE — PROGRESS NOTES
Chief Complaint   Patient presents with   Eli Leno is a pleasant 42year old male who presents as a follow up.  He denies pain

## 2022-04-15 NOTE — TELEPHONE ENCOUNTER
The patient called and said that he is completely out of oxycodone, 10mg, and needs a refill. The [facility] uses the [facility] [address].

## 2022-04-17 NOTE — PROGRESS NOTES
Palliative Medicine Outpatient Services  [place]: [phone] ([phone])    Patient Name: Baron Mensah. YOB: 1975    Date of Current Visit: 04/18/22  Location of Current Visit:    [] [facility] Office  [] [facility] Office  [] [address] [place] [address]  [] Home  [x] Other:  televisit    Date of Initial Visit: 2/19/19   Referral from: Dada Nieves MD  Primary Care Physician: Kami Blackwell MD      SUMMARY:   Baron Nassar is a 42y.o. year old with high-grade follicular lymphoma, who was referred to Palliative Medicine by Dr. Johnathan Jama for symptom management and supportive care. He was diagnosed in 11/2018 after presenting with back pain, treated with systemic chemotherapy with complete response. He suffered cardiac arrest during cycle #3 due to previously undiagnosed severe stenosis of the LAD s/p PTCA and stent. He was diagnosed with poorly differentiated carcinoma of the colon (no evidence of metastatic disease) in 2/14/22 and underwent loop ileostomy at [facility] on 2/19/22. He's scheduled for mass resection and colostomy reversal in late April with further treatment options to be determined after his surgery. The patients social history includes: he is single. He lives in [place] with his girlfriend, Valerie Bashir, and their 15month old son. Kuldip Youssef He has 3 teenage children who live with their mother and with whom he has close contact. 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 Palliative Medicine is addressing the following current patient/family concerns: abdominal pain related to malignancy; anxiety, depression; left mid- and low back pain, poor appetite, fatigue, advanced care planning. Initial Referral Intake note from Reyna Martinez RN reviewed prior to visit   PALLIATIVE DIAGNOSES:       ICD-10-CM ICD-9-CM    1. Abdominal pain, generalized  R10.84 789.07    2. Anxiety  F41.9 300.00    3. Reactive depression  F32.9 300.4    4. Poor appetite  R63.0 783.0    5.  Other fatigue  R53.83 780.79    6. Hypokalemia  E87.6 276.8    7. Nausea without vomiting  R11.0 787.02    8. Lymphoma in remission ([facility] [address]  C85.90 202.80    9. Malignant neoplasm of hepatic flexure (HCC)  C18.3 153.0           PLAN:   Patient Instructions     Dear Ramon Thompson. ,    It was a pleasure seeing you today via virtual visit. We will see you again in 4 weeks for a virtual visit. If labs or imaging tests have been ordered for you today, please call the office  at [phone] 48 hours after completion to obtain the results. Your described symptoms were: Fatigue: 4 Drowsiness: 2   Depression: 0 Pain: 5   Anxiety: 8 Nausea: 3   Anorexia: 3 Dyspnea: 0   Best Well-Bein Constipation: Yes   Other Problem (Comment): 0       This is the plan we talked about:    1. Abdominal pain  -Continue oxycodone 10-mg every 4 hours as needed  -Please call Palliative Medicine Clinic if you have more pain after your operation so we can adjust your pain medications    2. Depression/anxiety  -You have chronic anxiety which is unchanged  -Continue Lexapro 20-mg daily  -I'm avoiding benzodiazepines due to synergistic effect with opioids    3. Poor appetite/weight loss  -Continue eating your one good meal a day  -Try to eat smaller amounts of food throughout the day (4 to 5 times) to keep up with your nutritional needs as you've lost a small amount of weight since your last visit    4. Fatigue  -This is chronic and unchanged     5. Nausea  -Start ondansetron 8-mg every 8 hours as needed    5.  Colon mass  -You're scheduled for resection and colostomy reversal next week  -You'll meet with Dr. Stephanie Granados after your surgery to discuss additional treatment options    This is what you have shared with us about Advance Care Planning:      Primary Decision Maker: Lore Aguilar - Girlfriend - [phone]  Advance Care Planning 2022   Patient's Healthcare Decision Maker is: Legal Next of Kin   Confirm Advance Directive None Patient Would Like to Complete Advance Directive -   Does the patient have other document types -           The Palliative Medicine Team is here to support you and your family. Sincerely,      Naomi Jose MD and the Palliative Medicine Team    ADDENDUM - need to re-address Advance Medical Directives next visit     GOALS OF CARE / TREATMENT PREFERENCES:   [====Goals of Care====]  GOALS OF CARE:  Patient / health care proxy stated goals: See Patient Instructions / Summary    TREATMENT PREFERENCES:   Code Status:  [x] Attempt Resuscitation       [] Do Not Attempt Resuscitation    Advance Care Planning:  [x] The Pall Med Interdisciplinary Team has updated the ACP Navigator with Decision Maker and Patient Capacity      Primary Decision Maker: Laine Bermeo - [phone]    [] Named in a scanned document   [x] Legal Next of Kin  [] Guardian    Other:  (If patient appropriate for POST, consider using PALLPOST smart phrase here)    The palliative care team has discussed with patient / health care proxy about goals of care / treatment preferences for patient.  [====Goals of Care====]     PRESCRIPTIONS GIVEN:     Medications Ordered Today   Medications    oxyCODONE IR (ROXICODONE) 10 mg tab immediate release tablet     Sig: Take 1 Tablet by mouth every four (4) hours as needed for Pain for up to 15 days. Max Daily Amount: 60 mg.      Dispense:  90 Tablet     Refill:  0           FOLLOW UP:     Future Appointments   Date Time Provider Dahlia Epps   4/20/2022  3:00 PM [facility] PAT EXAM RM 1 [facility]    5/12/2022  9:00 AM EVELIA Chandra [facility] BS AMB   5/17/2022 10:00 AM Lauren Bowers MD [facility] BS AMB   6/27/2022 10:00 AM Rachna Coon DPM [facility] BS AMB           PHYSICIANS INVOLVED IN CARE:   Patient Care Team:  Yusuf Michel MD as PCP - General (Family Medicine)  Mitra Rivera MD (Hematology and Oncology)  Naomi Jose MD as Physician (Palliative Medicine)  Yamel Bone MD as Physician (Palliative Medicine)       HISTORY:   Reviewed patient-completed ESAS and advance care planning form. Reviewed patient record in prescription monitoring program.    CHIEF COMPLAINT:   Chief Complaint   Patient presents with    Abdominal Pain       HPI/SUBJECTIVE:    The patient is: [x] Verbal / [] Nonverbal     His abdominal pain is about the same. He's taking 4 to 6 oxycodone daily. He's scheduled for surgery at [facility] next week (mass resection and colostomy reversal). He's not eating much, just one good meal a day. He felt \"yukky\" over the weekend, more tired and  Nauseous. He feels better today, though. See Plan/Patient Instructions for additional interval history      From visit 3/2/22:  He started having abdominal pain about a month ago. Then he started feeling nauseous. He had trouble with bowel movements, feeling like he wasn't completing emptying his bowels. He went to the ED on 2/14, CT scan showed mass in his colon. He was hospitalized at [facility] 2/15-2/25 for colonoscopy and loop ileostomy. He's still having pain in his abdomen. He's been taking 2 oxycodone every 4 to 6 hours which eases the pain to ~5/10 which is tolerable. He's always anxious. He continues to take Lexapro. He's eating, not as much as he used to. He ate 2 PBJs yesterday. He had mild nausea for a few days after he came home. He's passing formed stool in his ostomy bag daily. Home health is coming to his home. He sees Dr. Daniela Valenzuela next week. From IV 2/19/19:  He has a lot of anxiety. He's lived with anxiety for a long time, sometimes it's been worse than others, like when he lost his job and lost his insurance. He took Wellbutrin then which made him more anxious and depressed. He's had more anxiety since he was diagnosed with lymphoma. He's been taking lorazepam and it doesn't help at all, even when he tried taking 2 pills. This is his biggest problem now. He feels depressed but it's not as bad as the anxiety.     He has pain in his back, that's what brought him to the hospital in November. He's been taking oxycodone which helps some. The groin pain started after his operation (cardiac cath) in the hospital last week. He expects that will get better as it heals. His pain doesn't bother him too much, not like the anxiety.     His appetite is getting better. He's lost ~30# since last fall but that's leveled off now.     He's moving his bowels regularly.     He sometimes gets short of breath but not as much as used to.     He doesn't have chest pain, never did.     He sleeps well at night. He doesn't have the energy to do much during the day.     He lives with his father. He sees his three teen-age children frequently (they live with their mother). His children give him a lot of strength.         Clinical Pain Assessment (nonverbal scale for nonverbal patients):   [++++ Clinical Pain Assessment++++]  [++++Pain Severity++++]: Pain: 5  [++++Pain Character++++]: stabbing pain in back  [++++Pain Duration++++]: months for back pain, weeks for groin pain  [++++Pain Effect++++]: little  [++++Pain Factors++++]: oxycodone helps with back pain, groin pain elicited by standing and walking  [++++Pain Frequency++++]: constant back pain with varying intensity  [++++Pain Location++++]: left lower back  [++++ Clinical Pain Assessment++++]    [++++ Clinical Pain Assessment++++]  [++++Pain Severity++++]: Pain: 5  [++++Pain Character++++]: deep, aching  [++++Pain Duration++++]: since 2/2022  [++++Pain Effect++++]: limits function, emotional distress  [++++Pain Factors++++]: unable to identify provoking factors  [++++Pain Frequency++++]: constant  [++++Pain Location++++]: right lower abdomen  [++++ Clinical Pain Assessment++++]         FUNCTIONAL ASSESSMENT:     Palliative Performance Scale (PPS):  PPS: 70       PSYCHOSOCIAL/SPIRITUAL SCREENING:     Any spiritual / Jehovah's witness concerns:  [] Yes /  [x] No    Caregiver Burnout:  [] Yes /  [x] No /  [] No Caregiver Present      Anticipatory grief assessment:   [x] Normal  / [] Maladaptive       ESAS Anxiety: Anxiety: 8    ESAS Depression: Depression: 0       REVIEW OF SYSTEMS:     The following systems were [x] reviewed / [] unable to be reviewed  Systems: constitutional, ears/nose/mouth/throat, respiratory, gastrointestinal, genitourinary, musculoskeletal, integumentary, neurologic, psychiatric, endocrine. Positive findings noted below. Modified ESAS Completed by: provider   Fatigue: 4 Drowsiness: 2   Depression: 0 Pain: 5   Anxiety: 8 Nausea: 3   Anorexia: 3 Dyspnea: 0   Best Well-Bein Constipation: No   Other Problem (Comment): 0          PHYSICAL EXAM:     Wt Readings from Last 3 Encounters:   04/15/22 141 lb 6.4 oz (64.1 kg)   22 144 lb (65.3 kg)   22 145 lb (65.8 kg)     There were no vitals taken for this visit.   Last bowel movement: See Nursing Note    Constitutional    [x] Appears well-developed and well-nourished in no apparent distress    [] Abnormal:  Mental status  [x] Alert and awake  [x] Oriented to person/place/time  [x] Able to follow commands  [] Abnormal:   Eyes  [x] EOM normal   [x] Sclera normal   [x] No visible ocular discharge  [] Abnormal:   HENT  [x] Normocephalic, atraumatic  [x] Mouth/Throat: Moist mucous membranes   [x] External Ears normal  [] Abnormal:  Neck  [x] No visualized mass  [] Abnormal:  Pulmonary/Chest   [x] Respiratory effort normal  [x] No visualized signs of difficulty breathing or respiratory distress  [] Abnormal:  Musculoskeletal  [x] Normal gait with no signs of ataxia  [x] Normal range of motion of neck  [] Abnormal:  Neurological:   [x] No facial asymmetry (Cranial nerve 7 motor function)  [x] No gaze palsy  [] Abnormal:   Skin  [x] No significant exanthematous lesions or discoloration noted on facial skin  [] Abnormal:                                  Psychiatric  [x] Normal affect  [x] No hallucinations  [] Abnormal:    Other pertinent observable physical exam findings:    Due to this being a TeleHealth evaluation, many elements of the physical examination are unable to be assessed. HISTORY:     Past Medical History:   Diagnosis Date    Anxiety     Cancer [facility])     lymphoma Nov 2018 receiving chemo    Chronic pain     lower back- lymphoma    Hyperlipidemia     Hypertension     Lymphadenopathy 11/12/2018      Past Surgical History:   Procedure Laterality Date    HX HEART CATHETERIZATION  02/2019    HX ILEOSTOMY      HX OTHER SURGICAL  02/2019    cardiac stent    IR INJECTION PSEUDOANEURYSM  2/26/2019      Family History   Problem Relation Age of Onset    Hypertension Father     Diabetes Father     Diabetes Mother       History reviewed, no pertinent family history. Social History     Tobacco Use    Smoking status: Current Every Day Smoker     Packs/day: 0.50     Years: 20.00     Pack years: 10.00    Smokeless tobacco: Never Used   Substance Use Topics    Alcohol use: No     No Known Allergies   Current Outpatient Medications   Medication Sig    oxyCODONE IR (ROXICODONE) 10 mg tab immediate release tablet Take 1 Tablet by mouth every four (4) hours as needed for Pain for up to 15 days. Max Daily Amount: 60 mg.    loperamide (IMODIUM) 2 mg capsule TAKE 1 CAPSULE BY MOUTH 3 TIMES A DAY BEFORE MEALS FOR 10 DAYS.  potassium chloride (KLOR-CON) 10 mEq tablet Take 1 Tab by mouth daily.  escitalopram oxalate (LEXAPRO) 20 mg tablet TAKE 1 TABLET BY MOUTH EVERY DAY    metoprolol succinate (TOPROL-XL) 50 mg XL tablet TAKE 1 TABLET BY MOUTH EVERY DAY    LORazepam (Ativan) 0.5 mg tablet Take 1 Tablet by mouth.  lisinopriL (PRINIVIL, ZESTRIL) 40 mg tablet TAKE 1 TABLET BY MOUTH EVERY DAY    atorvastatin (LIPITOR) 40 mg tablet TAKE 1 TAB BY MOUTH NIGHTLY.  INDICATIONS: TREATMENT TO SLOW PROGRESSION OF CORONARY ARTERY DISEASE    traZODone (DESYREL) 50 mg tablet Take 50 mg by mouth nightly. Take 50 mg nightly (Patient not taking: Reported on 4/18/2022)    predniSONE (DELTASONE) 20 mg tablet take 5 tablet by oral route  every day. Take on Days 1 through 5 each cycle (Patient not taking: Reported on 6/10/2021)     No current facility-administered medications for this visit. LAB DATA REVIEWED:     Lab Results   Component Value Date/Time    WBC 10.9 03/29/2022 01:22 PM    HGB 12.2 03/29/2022 01:22 PM    PLATELET 062 45/86/7716 01:22 PM     Lab Results   Component Value Date/Time    Sodium 137 03/29/2022 01:22 PM    Potassium 4.5 03/29/2022 01:22 PM    Chloride 108 03/29/2022 01:22 PM    CO2 22 03/29/2022 01:22 PM    BUN 14 03/29/2022 01:22 PM    Creatinine 1.05 03/29/2022 01:22 PM    Calcium 9.8 03/29/2022 01:22 PM    Magnesium 1.7 01/12/2019 04:05 AM    Phosphorus 2.0 (L) 01/12/2019 04:05 AM      Lab Results   Component Value Date/Time    Alk. phosphatase 108 03/29/2022 01:22 PM    Protein, total 7.8 03/29/2022 01:22 PM    Albumin 3.5 03/29/2022 01:22 PM    Globulin 4.3 (H) 03/29/2022 01:22 PM     Lab Results   Component Value Date/Time    INR 1.1 02/22/2019 08:18 PM    Prothrombin time 10.8 02/22/2019 08:18 PM    aPTT 27.8 02/22/2019 08:18 PM      Lab Results   Component Value Date/Time    Iron 17 (L) 02/15/2022 02:33 PM    TIBC 346 02/15/2022 02:33 PM    Iron % saturation 5 (L) 02/15/2022 02:33 PM    Ferritin 16 (L) 02/15/2022 02:33 PM          MRI lumbar spine 12/18/19:  Congenital narrowing of the lumbar canal. Vertebral body heights are maintained. Marrow signal is normal.     The conus medullaris terminates at T12/L1. Signal and caliber of the distal  spinal cord are within normal limits. There is no pathologic intrathecal  enhancement.     The paraspinal soft tissues are within normal limits.     Lower thoracic spine: No herniation or stenosis.     L1-L2: No herniation or stenosis. L2-L3: No herniation or stenosis. L3-L4: Mild facet arthropathy.  Minimal central disc protrusion. Mild canal  stenosis. Foramina are patent  L4-L5: Desiccation. Mild facet arthropathy. Canal demonstrates minimal stenosis. There is an annular ligament tear far laterally on the left. Mild left foraminal  stenosis. L5-S1: Mild facet arthropathy. Canal and foramina are patent. IMPRESSION:  Mild disc degenerative change at L3-4 and L4-5. Mild canal stenosis at L3-4 and mild left foraminal stenosis at L4-5. Other less severe degenerative findings are as described above. CT chest/abdomen 12/16/19:  THYROID: No nodule. MEDIASTINUM: No mass or lymphadenopathy. Port catheter in place on the right. Catheter tip in appropriate position. SB: No mass or lymphadenopathy. THORACIC AORTA: No dissection or aneurysm. MAIN PULMONARY ARTERY: Unremarkable  TRACHEA/BRONCHI: Unremarkable  ESOPHAGUS: No wall thickening or dilatation. HEART: Normal in size. PLEURA: No effusion or pneumothorax. LUNGS: Bibasilar atelectasis is noted. Donzell Pyo LIVER: No mass or biliary dilatation. GALLBLADDER: Layering contrast versus cholelithiasis. SPLEEN: No mass. PANCREAS: No mass or ductal dilatation. ADRENALS: The left adrenal gland is elevated by the retroperitoneal mass. The    right is unremarkable. KIDNEYS: There is diminished soft tissue density at the level of the left renal  hilum. There is increased left renal cortical atrophy. STOMACH: Unremarkable. SMALL BOWEL: No dilatation or wall thickening. COLON: No dilatation or wall thickening. APPENDIX: Unremarkable. PERITONEUM: No ascites or pneumoperitoneum. RETROPERITONEUM:   Diminished size of retroperitoneal mass. Diminished in size with margins difficult to fully ascertain. 2-62 35 x 50 mm previously 71 x 94 mm.     2-67 left periaortic adenopathy 25 x 17 mm  The SMA, celiac, and LIZZY are remain encased, diminished attenuation of these  vessels.      REPRODUCTIVE ORGANS: The prostate and seminal vesicles are unremarkable. URINARY  BLADDER: Unremarkable  BONES: No destructive bone lesion. ADDITIONAL COMMENTS: Resolved left inguinal pseudoaneurysm. .       IMPRESSION:    Baseline research examination. Findings are consistent with interval response to therapy. Continued diminished size of retroperitoneal mass-adenopathy,  with diminished soft tissue density at the left renal hilum. CT chest/abdomen/pelvis 2/14/22:  1. Annular mass lesion of the right colon with upstream fecal retention  concerning for primary colon neoplasm versus less likely lymphoma. 2. Soft tissue stranding around celiac axis, SMA, and abdominal aorta may  reflect infiltrative lymphoma. 3. Left renal atrophy with left hydronephrosis likely reflecting chronic  proximal ureteral obstruction. 4. Incidentals as above. CONTROLLED SUBSTANCES SAFETY ASSESSMENT (IF ON CONTROLLED SUBSTANCES):     Reviewed opioid safety handout:  [x] Yes   [] No  24 hour opioid dose >150mg morphine equivalent/day:  [] Yes   [x] No  Benzodiazepines:  [x] Yes   [] No  Sleep apnea:  [] Yes   [x] No  Urine Toxicology Testing within last 6 months:  [] Yes   [x] No  History of or new aberrant medication taking behaviors:  [] Yes   [x] No  Has Narcan been prescribed [x] Yes   [] No          Total time:   Counseling / coordination time:   > 50% counseling / coordination?:     Shade Krabbe Sr. , was evaluated through a synchronous (real-time) audio-video encounter. The patient (or guardian if applicable) is aware that this is a billable service, which includes applicable co-pays. This Virtual Visit was conducted with patient's (and/or legal guardian's) consent. The visit was conducted pursuant to the emergency declaration under the [address] [place] [address] authority and the [employer] General Act. Patient identification was verified, and a caregiver was present when appropriate.  The patient was located in a state where the provider was licensed to provide care.

## 2022-04-18 ENCOUNTER — VIRTUAL VISIT (OUTPATIENT)
Dept: PALLATIVE CARE | Age: 47
End: 2022-04-18
Payer: COMMERCIAL

## 2022-04-18 DIAGNOSIS — C18.3 MALIGNANT NEOPLASM OF HEPATIC FLEXURE (HCC): ICD-10-CM

## 2022-04-18 DIAGNOSIS — R63.0 POOR APPETITE: ICD-10-CM

## 2022-04-18 DIAGNOSIS — C85.90 LYMPHOMA IN REMISSION (HCC): ICD-10-CM

## 2022-04-18 DIAGNOSIS — R11.0 NAUSEA WITHOUT VOMITING: ICD-10-CM

## 2022-04-18 DIAGNOSIS — R10.84 ABDOMINAL PAIN, GENERALIZED: Primary | ICD-10-CM

## 2022-04-18 DIAGNOSIS — R53.83 OTHER FATIGUE: ICD-10-CM

## 2022-04-18 DIAGNOSIS — F32.9 REACTIVE DEPRESSION: ICD-10-CM

## 2022-04-18 DIAGNOSIS — E87.6 HYPOKALEMIA: ICD-10-CM

## 2022-04-18 DIAGNOSIS — F41.9 ANXIETY: ICD-10-CM

## 2022-04-18 PROCEDURE — 99214 OFFICE O/P EST MOD 30 MIN: CPT | Performed by: INTERNAL MEDICINE

## 2022-04-18 RX ORDER — OXYCODONE HYDROCHLORIDE 10 MG/1
10 TABLET ORAL
Qty: 90 TABLET | Refills: 0 | Status: ON HOLD | OUTPATIENT
Start: 2022-04-28 | End: 2022-04-27 | Stop reason: SDUPTHER

## 2022-04-18 NOTE — PROGRESS NOTES
Palliative Medicine Office Visit  Palliative Medicine Nurse Check In  [phone]    Patient Name: Marie Santa. YOB: 1975      Date of Office Visit: 4/18/2022  Patient states: \"  \"    From Check In Sheet (scanned in Media):  Has a medical provider talked with you about cardiopulmonary resuscitation (CPR)? [x] Yes   [] No   [] Unable to obtain    Nurse reminder to complete or update ACP FlowSheet:    Is ACP on the Problem List?    [] Yes    [x] No  IF ACP Document is ON FILE; Nurse to place ACP on Problem List     Is there an ACP Note in Chart Review/Note? [x] Yes    [] No   If NO: [address] Planning 3/15/2022   Patient's Healthcare Decision Maker is: Legal Next of Kin   Confirm Advance Directive None   Patient Would Like to Complete Advance Directive No   Does the patient have other document types -       Is there anything that we should know about you as a person in order to provide you the best care possible? Have you been to the ER, urgent care clinic since your last visit? [] Yes   [x] No   [] Unable to obtain    Have you been hospitalized since your last visit? [] Yes   [x] No   [] Unable to obtain    Have you seen or consulted any other health care providers outside of the [address] [place] [address] since your last visit?    [] Yes   [x] No   [] Unable to obtain    Functional status (describe):         Last BM: Colostomy bag     accessed (date): 4/18/2022    Bottle review (for opioid pain medication):  Medication 1:   Date filled:   Directions:   # filled:   # left:   # pills taking per day:  Last dose taken:    Medication 2:   Date filled:   Directions:   # filled:   # left:   # pills taking per day:  Last dose taken:    Medication 3:   Date filled:   Directions:   # filled:   # left:   # pills taking per day:  Last dose taken:    Medication 4:   Date filled:   Directions:   # filled:   # left:   # pills taking per day:  Last dose taken:

## 2022-04-18 NOTE — PATIENT INSTRUCTIONS
Dear Dominique Murguia. ,    It was a pleasure seeing you today via virtual visit. We will see you again in 4 weeks for a virtual visit. If labs or imaging tests have been ordered for you today, please call the office  at [phone] 48 hours after completion to obtain the results. Your described symptoms were: Fatigue: 4 Drowsiness: 2   Depression: 0 Pain: 5   Anxiety: 8 Nausea: 3   Anorexia: 3 Dyspnea: 0   Best Well-Bein Constipation: Yes   Other Problem (Comment): 0       This is the plan we talked about:    1. Abdominal pain  -Continue oxycodone 10-mg every 4 hours as needed  -Please call Palliative Medicine Clinic if you have more pain after your operation so we can adjust your pain medications    2. Depression/anxiety  -You have chronic anxiety which is unchanged  -Continue Lexapro 20-mg daily  -I'm avoiding benzodiazepines due to synergistic effect with opioids    3. Poor appetite/weight loss  -Continue eating your one good meal a day  -Try to eat smaller amounts of food throughout the day (4 to 5 times) to keep up with your nutritional needs as you've lost a small amount of weight since your last visit    4. Fatigue  -This is chronic and unchanged     5. Nausea  -Start ondansetron 8-mg every 8 hours as needed    5. Colon mass  -You're scheduled for resection and colostomy reversal next week  -You'll meet with Dr. Mathew Hernandez after your surgery to discuss additional treatment options    This is what you have shared with us about Advance Care Planning:      Primary Decision Maker: Veronica Walker - Girlfriend - [phone]  Advance Care Planning 2022   Patient's Healthcare Decision Maker is: Legal Next of Kin   Confirm Advance Directive None   Patient Would Like to Complete Advance Directive -   Does the patient have other document types -           The Palliative Medicine Team is here to support you and your family.        Sincerely,      Otto Pineda MD and the Palliative Medicine Team

## 2022-04-20 ENCOUNTER — HOSPITAL ENCOUNTER (OUTPATIENT)
Dept: PREADMISSION TESTING | Age: 47
Discharge: HOME | End: 2022-04-20
Payer: COMMERCIAL

## 2022-04-20 ENCOUNTER — HOSPITAL ENCOUNTER (OUTPATIENT)
Dept: GENERAL RADIOLOGY | Age: 47
Discharge: HOME | End: 2022-04-20
Attending: SURGERY
Payer: COMMERCIAL

## 2022-04-20 VITALS
RESPIRATION RATE: 12 BRPM | SYSTOLIC BLOOD PRESSURE: 110 MMHG | WEIGHT: 142.86 LBS | HEIGHT: 67 IN | HEART RATE: 83 BPM | BODY MASS INDEX: 22.42 KG/M2 | TEMPERATURE: 97.6 F | DIASTOLIC BLOOD PRESSURE: 75 MMHG

## 2022-04-20 LAB
ABO + RH BLD: NORMAL
BLOOD GROUP ANTIBODIES SERPL: NORMAL
EST. AVERAGE GLUCOSE BLD GHB EST-MCNC: 105 MG/DL
HBA1C MFR BLD: 5.3 % (ref 4–5.6)
SPECIMEN EXP DATE BLD: NORMAL

## 2022-04-20 PROCEDURE — 93005 ELECTROCARDIOGRAM TRACING: CPT

## 2022-04-20 PROCEDURE — 71046 X-RAY EXAM CHEST 2 VIEWS: CPT

## 2022-04-20 PROCEDURE — 36415 COLL VENOUS BLD VENIPUNCTURE: CPT

## 2022-04-20 PROCEDURE — 83036 HEMOGLOBIN GLYCOSYLATED A1C: CPT

## 2022-04-20 PROCEDURE — 86900 BLOOD TYPING SEROLOGIC ABO: CPT

## 2022-04-20 RX ORDER — BISMUTH SUBSALICYLATE 262 MG
1 TABLET,CHEWABLE ORAL DAILY
COMMUNITY

## 2022-04-20 NOTE — PERIOP NOTES
[facility] Nutrition Screen    1. Has the patient had an unplanned weight loss of 10% of body weight or more in the last 6 months? Yes = 1   2. Has the patient been eating less than 50% of their normal diet in the preceding week? Yes = 1       Patient instructed on Non-Diabetic pre-operative nutrition plan to start 5 days prior to surgery. Patient given 10 shakes and 3 pre-surgery drinks. Opportunity given for questions and all questions answered. Surgical consent obtained and signed by patient. Patient instructed to obtain chest X-ray at [address] [place] [address] upon leaving PAT department. Patient advised to contact Dr. Jose Reveles office to see if bowel prep or pre-op antibiotics need to be given. He did not have any knowledge of either.

## 2022-04-20 NOTE — PERIOP NOTES
[address] INSTRUCTIONS  [facility]    Surgery Date:   4/27/22    [facility] staff will call you between 4 PM- 8 PM the day before surgery with your arrival time. If your surgery is on a Monday, we will call you the preceding Friday. Please call [phone] after 8 PM if you did not receive your arrival time. 1. Please report to [facility] Patient Access/Admitting on the 1st floor. Bring your insurance card, photo identification, and any copayment ( if applicable). 2. If you are going home the same day of your surgery, you must have a responsible adult to drive you home. You need to have a responsible adult to stay with you the first 24 hours after surgery and you should not drive a car for 24 hours following your surgery. 3. If you are being admitted to the hospital, please leave personal belongings/luggage in your car until you have an assigned hospital room number. 4. Please refer to [facility] book for Pre-operative Nutrition Plan. 5. Do NOT drink alcohol or smoke 24 hours before surgery. STOP smoking for 14 days prior as it helps with breathing and healing after surgery. 6. Please wear comfortable clothes. Wear your glasses instead of contacts. We ask that all money, jewelry and valuables be left at home. Wear no make up, particularly mascara, the day of surgery. 7.  All body piercings, rings, and jewelry need to be removed and left at home. Please remove any nail polish or artificial nails from your fingernails. Please wear your hair loose or down. Please no pony-tails, buns, or any metal hair accessories. If you shower the morning of surgery, please do not apply any lotions or powders afterwards. You may wear deodorant. Do not shave any body area within 24 hours of your surgery. 8. Please follow all instructions to avoid any potential surgical cancellation. 9. Should your physical condition change, (i.e. fever, cold, flu, etc.) please notify your surgeon as soon as possible.   10. It is important to be on time. If a situation occurs where you may be delayed, please call:  [phone] / [phone] on the day of surgery. 11. The Preadmission Testing staff can be reached at [phone]. 12. Special instructions: CALL DR. Juan Middleton OFFICE TO CONFIRM NO BOWEL PREP OR ANTIBIOTICS NEEDED PRIOR TO SURGERY. Current Outpatient Medications   Medication Sig    multivitamin (ONE A DAY) tablet Take 1 Tablet by mouth daily.  [START ON 4/28/2022] oxyCODONE IR (ROXICODONE) 10 mg tab immediate release tablet Take 1 Tablet by mouth every four (4) hours as needed for Pain for up to 15 days. Max Daily Amount: 60 mg.    atorvastatin (LIPITOR) 40 mg tablet TAKE 1 TAB BY MOUTH NIGHTLY. INDICATIONS: TREATMENT TO SLOW PROGRESSION OF CORONARY ARTERY DISEASE     No current facility-administered medications for this encounter. 1. YOU MUST ONLY TAKE THESE MEDICATIONS THE MORNING OF SURGERY WITH A SIP OF WATER: NONE  2. MEDICATIONS TO TAKE THE MORNING OF SURGERY ONLY IF NEEDED: MAY TAKE OXYCODONE UP TO 4 HOURS PRIOR TO SURGERY IF NEEDED  3. HOLD these prescription medications BEFORE surgery: N/A  4. Ask your surgeon/prescribing physician about when/if to STOP taking these medications: N/A  5. Stop all vitamins, herbal medicines and Aspirin containing products 7 days prior to surgery. Stop any non-steroidal anti-inflammatory drugs (i.e. Ibuprofen, Naproxen, Advil, Aleve) 3 days before surgery. You may take Tylenol. 6. If you are currently taking Aspirin, Plavix, Coumadin or any other blood-thinning/anticoagulant medication contact your prescribing physician for instructions. Eating and Drinking Before Surgery     You may eat a regular dinner at the usual time on the day before your surgery.  Do NOT eat any solid foods after midnight unless your arrival time at the hospital is 3pm or later.    You may drink clear liquids only from 12 midnight until 1 hour prior to your arrival time at the hospital on the day of your surgery. Do NOT drink alcohol.  Clear liquids include:  o Water  o Fruit juices without pulp( i.e. apple juice)  o Carbonated beverages  o Black coffee (no cream/milk)  o Tea (no cream/milk)  o Gatorade   You may drink up to 12-16 ounces at one time every 4 hours between the hours of midnight and 1 hour before your arrival time at the hospital. Example- if your arrival time at the hospital is 6am, you may drink 12-16 ounces of clear liquids no later than 5am.   If your arrival time at the hospital is 3pm or later, you may eat a light breakfast before 8am.   A light breakfast includes:  o Toast or bagel (no butter)  o Black coffee (no cream/milk)  o Tea (no cream/milk)  o Fruit juices without pulp ( i.e. apple juice)  o Do NOT eat meat, eggs, vegetables or fruit   If you have any questions, please contact your surgeon's office. Incentive Spirometer        Using the incentive spirometer helps expand the small air sacs of your lungs, helps you breathe deeply, and helps improve your lung function. Use your incentive spirometer twice a day (10 breaths each time) prior to surgery. How to Use Your Incentive Spirometer:  1. Hold the incentive spirometer in an upright position. 2. Breathe out as usual.   3. Place the mouthpiece in your mouth and seal your lips tightly around it. 4. Take a deep breath. Breathe in slowly and as deeply as possible. Keep the blue flow rate guide between the arrows. 5. Hold your breath as long as possible. Then exhale slowly and allow the piston to fall to the bottom of the column. 6. Rest for a few seconds and repeat steps one through five at least 10 times. Opportunity given to ask and answer questions. Preventing Infections Before and After - Your Surgery    IMPORTANT INSTRUCTIONS    You play an important role in your health and preparation for surgery.  To reduce the germs on your skin you will need to shower with CHG soap (Chorhexidine gluconate 4%) two times before surgery. CHG soap (Hibiclens, Hex-A-Clens or store brand)   CHG soap will be provided at your Preadmission Testing (PAT) appointment.  If you do not have a PAT appointment before surgery, you may arrange to  CHG soap from our office or purchase CHG soap at a pharmacy, grocery or department store.  You need to purchase TWO 4 ounce bottles to use for your 2 showers. Steps to follow:  1. Wash your hair with your normal shampoo and your body with regular soap and rinse well to remove shampoo and soap from your skin. 2. Wet a clean washcloth and turn off the shower. 3. Put CHG soap on washcloth and apply to your entire body from the neck down. Do not use on your head, face or private parts(genitals). Do not use CHG soap on open sores, wounds or areas of skin irritation. 4. Wash you body gently for 5 minutes. Do not wash your skin too hard. This soap does not create lather. Pay special attention to your underarms and from your belly button to your feet. 5. Turn the shower back on and rinse well to get CHG soap off your body. 6. Pat your skin dry with a clean, dry towel. Do not apply lotions or moisturizer. 7. Put on clean clothes and sleep on fresh bed sheets and do not allow pets to sleep with you. Shower with CHG soap 2 times before your surgery   The evening before your surgery   The morning of your surgery      Tips to help prevent infections after your surgery:  1. Protect your surgical wound from germs:  ? Hand washing is the most important thing you and your caregivers can do to prevent infections. ? Keep your bandage clean and dry! ? Do not touch your surgical wound. 2. Use clean, freshly washed towels and washcloths every time you shower; do not share bath linens with others. 3. Until your surgical wound is healed, wear clothing and sleep on bed linens each day that are clean and freshly washed.   4. Do not allow pets to sleep in your bed with you or touch your surgical wound. 5. Do not smoke - smoking delays wound healing. This may be a good time to stop smoking. 6. If you have diabetes, it is important for you to manage your blood sugar levels properly before your surgery as well as after your surgery. Poorly managed blood sugar levels slow down wound healing and prevent you from healing completely. Patient Information Regarding COVID Restrictions    Day of Procedure     Please park in the parking deck or any designated visitor parking lot.  Enter the facility through the [facility] the [facility].   On the day of surgery, please provide the cell phone number of the person who will be waiting for you to the Patient Access representative at the time of registration.  Please wear a mask on the day of your procedure.  We are now allowing two designated visitors per stay. Pediatric patients may have 2 designated visitors. These two people may come in with you on the day of your procedure.  No visitors under the age of 13.  The designated visitor must also wear a mask.  Once your procedure and the immediate recovery period is completed, a nurse in the recovery area will contact your designated visitor to inform them of your room number or to otherwise review other pertinent information regarding your care.  Social distancing practices are to be adhered to in waiting areas and the cafeteria. The patient was contacted in person. He verbalized understanding of all instructions does not  need reinforcement.

## 2022-04-21 NOTE — PERIOP NOTES
Preoperative Nutrition Screen (LINDA)   Patient's Age: 42 y.o. Patient's BMI: Estimated body mass index is 22.37 kg/m² as calculated from the following:    Height as of this encounter: 5' 7\" (1.702 m). Weight as of this encounter: 64.8 kg (142 lb 13.7 oz). If the answer to any of the following is Yes, then recommend prescribe Oral Nutrition Supplements (ONS) for at least 5 days prior to surgery and/or order referral to dietitian for further assessment and nutrition therapy. 1. Does the patient have a documented serum albumin less than 3.0 within the last 90 days? No = 0          2. Is patient's BMI less than 18.5 (or less than 20 if age over 72)? No = 0        3. Has the patient had an unplanned weight loss of 10% of body weight or more in the last 6 months? Yes = 1   4. Has the patient been eating less than 50% of their normal diet in the preceding week? Yes = 1   LINDA Score (number of Yes responses), 0-4 2     Plan:   2 daily immuno nutrition shakes, 5 days prior to surgery and 5 days post-operatively. 3 pre-surgery drinks.       Electronically signed by Faith Tapia NP on 4/21/22 at 8:43 AM

## 2022-04-22 LAB
ATRIAL RATE: 81 BPM
CALCULATED P AXIS, ECG09: -25 DEGREES
CALCULATED R AXIS, ECG10: 50 DEGREES
CALCULATED T AXIS, ECG11: 31 DEGREES
DIAGNOSIS, 93000: NORMAL
P-R INTERVAL, ECG05: 110 MS
Q-T INTERVAL, ECG07: 362 MS
QRS DURATION, ECG06: 92 MS
QTC CALCULATION (BEZET), ECG08: 420 MS
VENTRICULAR RATE, ECG03: 81 BPM

## 2022-04-27 ENCOUNTER — ANESTHESIA EVENT (OUTPATIENT)
Dept: SURGERY | Age: 47
End: 2022-04-27
Payer: COMMERCIAL

## 2022-04-27 ENCOUNTER — DOCUMENTATION ONLY (OUTPATIENT)
Dept: ONCOLOGY | Age: 47
End: 2022-04-27

## 2022-04-27 ENCOUNTER — TELEPHONE (OUTPATIENT)
Dept: ONCOLOGY | Age: 47
End: 2022-04-27

## 2022-04-27 ENCOUNTER — ANESTHESIA (OUTPATIENT)
Dept: SURGERY | Age: 47
End: 2022-04-27
Payer: COMMERCIAL

## 2022-04-27 ENCOUNTER — HOSPITAL ENCOUNTER (OUTPATIENT)
Age: 47
Discharge: HOME | End: 2022-04-27
Attending: SURGERY | Admitting: SURGERY
Payer: COMMERCIAL

## 2022-04-27 VITALS
BODY MASS INDEX: 21.91 KG/M2 | DIASTOLIC BLOOD PRESSURE: 81 MMHG | SYSTOLIC BLOOD PRESSURE: 129 MMHG | HEART RATE: 78 BPM | OXYGEN SATURATION: 96 % | HEIGHT: 67 IN | TEMPERATURE: 97.7 F | RESPIRATION RATE: 17 BRPM | WEIGHT: 139.6 LBS

## 2022-04-27 DIAGNOSIS — R10.84 ABDOMINAL PAIN, GENERALIZED: ICD-10-CM

## 2022-04-27 DIAGNOSIS — C18.3 MALIGNANT NEOPLASM OF HEPATIC FLEXURE (HCC): ICD-10-CM

## 2022-04-27 PROBLEM — C18.9 COLON ADENOCARCINOMA (HCC): Status: ACTIVE | Noted: 2022-01-01

## 2022-04-27 PROBLEM — C18.9 COLON ADENOCARCINOMA (HCC): Status: ACTIVE | Noted: 2022-04-27

## 2022-04-27 PROCEDURE — 77030040922 HC BLNKT HYPOTHRM STRY -A

## 2022-04-27 PROCEDURE — 77030038157 HC DEV PWR CNTR DISP SIGNIA COVD -C: Performed by: SURGERY

## 2022-04-27 PROCEDURE — 88341 IMHCHEM/IMCYTCHM EA ADD ANTB: CPT

## 2022-04-27 PROCEDURE — 77030002933 HC SUT MCRYL J&J -A: Performed by: SURGERY

## 2022-04-27 PROCEDURE — 74011250637 HC RX REV CODE- 250/637: Performed by: SURGERY

## 2022-04-27 PROCEDURE — 77030031139 HC SUT VCRL2 J&J -A: Performed by: SURGERY

## 2022-04-27 PROCEDURE — P9045 ALBUMIN (HUMAN), 5%, 250 ML: HCPCS | Performed by: NURSE ANESTHETIST, CERTIFIED REGISTERED

## 2022-04-27 PROCEDURE — 77030042556 HC PNCL CAUT -B: Performed by: SURGERY

## 2022-04-27 PROCEDURE — 77030039895 HC SYST SMK EVAC LAP COVD -B: Performed by: SURGERY

## 2022-04-27 PROCEDURE — 77030003578 HC NDL INSUF VERES AMR -B: Performed by: SURGERY

## 2022-04-27 PROCEDURE — 88342 IMHCHEM/IMCYTCHM 1ST ANTB: CPT

## 2022-04-27 PROCEDURE — 88305 TISSUE EXAM BY PATHOLOGIST: CPT

## 2022-04-27 PROCEDURE — 74011000250 HC RX REV CODE- 250: Performed by: NURSE ANESTHETIST, CERTIFIED REGISTERED

## 2022-04-27 PROCEDURE — 76210000017 HC OR PH I REC 1.5 TO 2 HR: Performed by: SURGERY

## 2022-04-27 PROCEDURE — 77030034628 HC LIGASURE LAP SEAL DIV MD COVD -F: Performed by: SURGERY

## 2022-04-27 PROCEDURE — 76060000035 HC ANESTHESIA 2 TO 2.5 HR: Performed by: SURGERY

## 2022-04-27 PROCEDURE — 77030026438 HC STYL ET INTUB CARD -A: Performed by: ANESTHESIOLOGY

## 2022-04-27 PROCEDURE — 74011250636 HC RX REV CODE- 250/636: Performed by: SURGERY

## 2022-04-27 PROCEDURE — 77030008684 HC TU ET CUF COVD -B: Performed by: ANESTHESIOLOGY

## 2022-04-27 PROCEDURE — 88331 PATH CONSLTJ SURG 1 BLK 1SPC: CPT

## 2022-04-27 PROCEDURE — 77030005513 HC CATH URETH FOL11 MDII -B: Performed by: SURGERY

## 2022-04-27 PROCEDURE — 74011250636 HC RX REV CODE- 250/636: Performed by: NURSE ANESTHETIST, CERTIFIED REGISTERED

## 2022-04-27 PROCEDURE — 74011250636 HC RX REV CODE- 250/636: Performed by: ANESTHESIOLOGY

## 2022-04-27 PROCEDURE — 49321 LAPAROSCOPY BIOPSY: CPT | Performed by: PHYSICIAN ASSISTANT

## 2022-04-27 PROCEDURE — 2709999900 HC NON-CHARGEABLE SUPPLY: Performed by: SURGERY

## 2022-04-27 PROCEDURE — 76210000020 HC REC RM PH II FIRST 0.5 HR: Performed by: SURGERY

## 2022-04-27 PROCEDURE — 49321 LAPAROSCOPY BIOPSY: CPT | Performed by: SURGERY

## 2022-04-27 PROCEDURE — 77030008608 HC TRCR ENDOSC SMTH AMR -B: Performed by: SURGERY

## 2022-04-27 PROCEDURE — 77030016151 HC PROTCTR LNS DFOG COVD -B: Performed by: SURGERY

## 2022-04-27 PROCEDURE — 77030012893

## 2022-04-27 PROCEDURE — 77030020829: Performed by: SURGERY

## 2022-04-27 PROCEDURE — 77030002996 HC SUT SLK J&J -A: Performed by: SURGERY

## 2022-04-27 PROCEDURE — 77030040361 HC SLV COMPR DVT MDII -B: Performed by: SURGERY

## 2022-04-27 PROCEDURE — 74011000250 HC RX REV CODE- 250: Performed by: SURGERY

## 2022-04-27 PROCEDURE — 76010000153 HC OR TIME 1.5 TO 2 HR: Performed by: SURGERY

## 2022-04-27 PROCEDURE — 77030012770 HC TRCR OPT FX AMR -B: Performed by: SURGERY

## 2022-04-27 PROCEDURE — 77030002966 HC SUT PDS J&J -A: Performed by: SURGERY

## 2022-04-27 PROCEDURE — 77030010507 HC ADH SKN DERMBND J&J -B: Performed by: SURGERY

## 2022-04-27 RX ORDER — DEXAMETHASONE SODIUM PHOSPHATE 4 MG/ML
INJECTION, SOLUTION INTRA-ARTICULAR; INTRALESIONAL; INTRAMUSCULAR; INTRAVENOUS; SOFT TISSUE AS NEEDED
Status: DISCONTINUED | OUTPATIENT
Start: 2022-04-27 | End: 2022-04-27 | Stop reason: HOSPADM

## 2022-04-27 RX ORDER — DIPHENHYDRAMINE HYDROCHLORIDE 50 MG/ML
12.5 INJECTION, SOLUTION INTRAMUSCULAR; INTRAVENOUS AS NEEDED
Status: DISCONTINUED | OUTPATIENT
Start: 2022-04-27 | End: 2022-04-27 | Stop reason: HOSPADM

## 2022-04-27 RX ORDER — METRONIDAZOLE 500 MG/100ML
500 INJECTION, SOLUTION INTRAVENOUS ONCE
Status: COMPLETED | OUTPATIENT
Start: 2022-04-27 | End: 2022-04-27

## 2022-04-27 RX ORDER — MIDAZOLAM HYDROCHLORIDE 1 MG/ML
1 INJECTION, SOLUTION INTRAMUSCULAR; INTRAVENOUS AS NEEDED
Status: DISCONTINUED | OUTPATIENT
Start: 2022-04-27 | End: 2022-04-27 | Stop reason: HOSPADM

## 2022-04-27 RX ORDER — SODIUM CHLORIDE, SODIUM LACTATE, POTASSIUM CHLORIDE, CALCIUM CHLORIDE 600; 310; 30; 20 MG/100ML; MG/100ML; MG/100ML; MG/100ML
75 INJECTION, SOLUTION INTRAVENOUS CONTINUOUS
Status: DISCONTINUED | OUTPATIENT
Start: 2022-04-27 | End: 2022-04-27 | Stop reason: HOSPADM

## 2022-04-27 RX ORDER — ACETAMINOPHEN 325 MG/1
650 TABLET ORAL ONCE
Status: DISCONTINUED | OUTPATIENT
Start: 2022-04-27 | End: 2022-04-27 | Stop reason: HOSPADM

## 2022-04-27 RX ORDER — SODIUM CHLORIDE 0.9 % (FLUSH) 0.9 %
5-40 SYRINGE (ML) INJECTION EVERY 8 HOURS
Status: DISCONTINUED | OUTPATIENT
Start: 2022-04-27 | End: 2022-04-27 | Stop reason: HOSPADM

## 2022-04-27 RX ORDER — MAGNESIUM SULFATE HEPTAHYDRATE 40 MG/ML
INJECTION, SOLUTION INTRAVENOUS AS NEEDED
Status: DISCONTINUED | OUTPATIENT
Start: 2022-04-27 | End: 2022-04-27 | Stop reason: HOSPADM

## 2022-04-27 RX ORDER — ONDANSETRON 2 MG/ML
4 INJECTION INTRAMUSCULAR; INTRAVENOUS AS NEEDED
Status: DISCONTINUED | OUTPATIENT
Start: 2022-04-27 | End: 2022-04-27 | Stop reason: HOSPADM

## 2022-04-27 RX ORDER — SODIUM CHLORIDE 0.9 % (FLUSH) 0.9 %
5-10 SYRINGE (ML) INJECTION AS NEEDED
Status: CANCELLED | OUTPATIENT
Start: 2022-05-06

## 2022-04-27 RX ORDER — MIDAZOLAM HYDROCHLORIDE 1 MG/ML
0.5 INJECTION, SOLUTION INTRAMUSCULAR; INTRAVENOUS
Status: DISCONTINUED | OUTPATIENT
Start: 2022-04-27 | End: 2022-04-27 | Stop reason: HOSPADM

## 2022-04-27 RX ORDER — SODIUM CHLORIDE 0.9 % (FLUSH) 0.9 %
5-40 SYRINGE (ML) INJECTION AS NEEDED
Status: DISCONTINUED | OUTPATIENT
Start: 2022-04-27 | End: 2022-04-27 | Stop reason: HOSPADM

## 2022-04-27 RX ORDER — CELECOXIB 200 MG/1
200 CAPSULE ORAL ONCE
Status: COMPLETED | OUTPATIENT
Start: 2022-04-27 | End: 2022-04-27

## 2022-04-27 RX ORDER — ONDANSETRON 2 MG/ML
INJECTION INTRAMUSCULAR; INTRAVENOUS AS NEEDED
Status: DISCONTINUED | OUTPATIENT
Start: 2022-04-27 | End: 2022-04-27 | Stop reason: HOSPADM

## 2022-04-27 RX ORDER — SODIUM CHLORIDE 9 MG/ML
10 INJECTION INTRAMUSCULAR; INTRAVENOUS; SUBCUTANEOUS AS NEEDED
Status: CANCELLED | OUTPATIENT
Start: 2022-05-06

## 2022-04-27 RX ORDER — SODIUM CHLORIDE, SODIUM LACTATE, POTASSIUM CHLORIDE, CALCIUM CHLORIDE 600; 310; 30; 20 MG/100ML; MG/100ML; MG/100ML; MG/100ML
INJECTION, SOLUTION INTRAVENOUS
Status: DISCONTINUED | OUTPATIENT
Start: 2022-04-27 | End: 2022-04-27 | Stop reason: HOSPADM

## 2022-04-27 RX ORDER — BUPIVACAINE HYDROCHLORIDE AND EPINEPHRINE 2.5; 5 MG/ML; UG/ML
INJECTION, SOLUTION EPIDURAL; INFILTRATION; INTRACAUDAL; PERINEURAL AS NEEDED
Status: DISCONTINUED | OUTPATIENT
Start: 2022-04-27 | End: 2022-04-27 | Stop reason: HOSPADM

## 2022-04-27 RX ORDER — PROPOFOL 10 MG/ML
INJECTION, EMULSION INTRAVENOUS AS NEEDED
Status: DISCONTINUED | OUTPATIENT
Start: 2022-04-27 | End: 2022-04-27 | Stop reason: HOSPADM

## 2022-04-27 RX ORDER — HYDROMORPHONE HYDROCHLORIDE 1 MG/ML
0.2 INJECTION, SOLUTION INTRAMUSCULAR; INTRAVENOUS; SUBCUTANEOUS
Status: DISCONTINUED | OUTPATIENT
Start: 2022-04-27 | End: 2022-04-27 | Stop reason: HOSPADM

## 2022-04-27 RX ORDER — LIDOCAINE HYDROCHLORIDE ANHYDROUS AND DEXTROSE MONOHYDRATE .8; 5 G/100ML; G/100ML
INJECTION, SOLUTION INTRAVENOUS
Status: DISCONTINUED | OUTPATIENT
Start: 2022-04-27 | End: 2022-04-27 | Stop reason: HOSPADM

## 2022-04-27 RX ORDER — OXYCODONE HYDROCHLORIDE 10 MG/1
10 TABLET ORAL
Qty: 20 TABLET | Refills: 0 | Status: SHIPPED | OUTPATIENT
Start: 2022-04-28 | End: 2022-05-05

## 2022-04-27 RX ORDER — SODIUM CHLORIDE 9 MG/ML
25 INJECTION, SOLUTION INTRAVENOUS CONTINUOUS
Status: DISCONTINUED | OUTPATIENT
Start: 2022-04-27 | End: 2022-04-27 | Stop reason: HOSPADM

## 2022-04-27 RX ORDER — HEPARIN 100 UNIT/ML
500 SYRINGE INTRAVENOUS AS NEEDED
Status: CANCELLED | OUTPATIENT
Start: 2022-05-06

## 2022-04-27 RX ORDER — SODIUM CHLORIDE, SODIUM LACTATE, POTASSIUM CHLORIDE, CALCIUM CHLORIDE 600; 310; 30; 20 MG/100ML; MG/100ML; MG/100ML; MG/100ML
125 INJECTION, SOLUTION INTRAVENOUS CONTINUOUS
Status: DISCONTINUED | OUTPATIENT
Start: 2022-04-27 | End: 2022-04-27 | Stop reason: HOSPADM

## 2022-04-27 RX ORDER — FENTANYL CITRATE 50 UG/ML
INJECTION, SOLUTION INTRAMUSCULAR; INTRAVENOUS AS NEEDED
Status: DISCONTINUED | OUTPATIENT
Start: 2022-04-27 | End: 2022-04-27 | Stop reason: HOSPADM

## 2022-04-27 RX ORDER — KETAMINE HYDROCHLORIDE 50 MG/ML
INJECTION, SOLUTION INTRAMUSCULAR; INTRAVENOUS AS NEEDED
Status: DISCONTINUED | OUTPATIENT
Start: 2022-04-27 | End: 2022-04-27 | Stop reason: HOSPADM

## 2022-04-27 RX ORDER — ACETAMINOPHEN 500 MG
1000 TABLET ORAL ONCE
Status: COMPLETED | OUTPATIENT
Start: 2022-04-27 | End: 2022-04-27

## 2022-04-27 RX ORDER — ALBUMIN HUMAN 50 G/1000ML
SOLUTION INTRAVENOUS AS NEEDED
Status: DISCONTINUED | OUTPATIENT
Start: 2022-04-27 | End: 2022-04-27 | Stop reason: HOSPADM

## 2022-04-27 RX ORDER — FENTANYL CITRATE 50 UG/ML
50 INJECTION, SOLUTION INTRAMUSCULAR; INTRAVENOUS AS NEEDED
Status: DISCONTINUED | OUTPATIENT
Start: 2022-04-27 | End: 2022-04-27 | Stop reason: HOSPADM

## 2022-04-27 RX ORDER — LIDOCAINE HYDROCHLORIDE 20 MG/ML
INJECTION, SOLUTION EPIDURAL; INFILTRATION; INTRACAUDAL; PERINEURAL AS NEEDED
Status: DISCONTINUED | OUTPATIENT
Start: 2022-04-27 | End: 2022-04-27 | Stop reason: HOSPADM

## 2022-04-27 RX ORDER — LIDOCAINE HYDROCHLORIDE 10 MG/ML
0.1 INJECTION, SOLUTION EPIDURAL; INFILTRATION; INTRACAUDAL; PERINEURAL AS NEEDED
Status: DISCONTINUED | OUTPATIENT
Start: 2022-04-27 | End: 2022-04-27 | Stop reason: HOSPADM

## 2022-04-27 RX ORDER — MIDAZOLAM HYDROCHLORIDE 1 MG/ML
INJECTION, SOLUTION INTRAMUSCULAR; INTRAVENOUS AS NEEDED
Status: DISCONTINUED | OUTPATIENT
Start: 2022-04-27 | End: 2022-04-27 | Stop reason: HOSPADM

## 2022-04-27 RX ORDER — FENTANYL CITRATE 50 UG/ML
25 INJECTION, SOLUTION INTRAMUSCULAR; INTRAVENOUS
Status: COMPLETED | OUTPATIENT
Start: 2022-04-27 | End: 2022-04-27

## 2022-04-27 RX ORDER — ROPIVACAINE HYDROCHLORIDE 5 MG/ML
30 INJECTION, SOLUTION EPIDURAL; INFILTRATION; PERINEURAL ONCE
Status: DISCONTINUED | OUTPATIENT
Start: 2022-04-27 | End: 2022-04-27 | Stop reason: HOSPADM

## 2022-04-27 RX ORDER — SUCCINYLCHOLINE CHLORIDE 20 MG/ML
INJECTION INTRAMUSCULAR; INTRAVENOUS AS NEEDED
Status: DISCONTINUED | OUTPATIENT
Start: 2022-04-27 | End: 2022-04-27 | Stop reason: HOSPADM

## 2022-04-27 RX ORDER — ROCURONIUM BROMIDE 10 MG/ML
INJECTION, SOLUTION INTRAVENOUS AS NEEDED
Status: DISCONTINUED | OUTPATIENT
Start: 2022-04-27 | End: 2022-04-27 | Stop reason: HOSPADM

## 2022-04-27 RX ORDER — MORPHINE SULFATE 2 MG/ML
2 INJECTION, SOLUTION INTRAMUSCULAR; INTRAVENOUS
Status: DISCONTINUED | OUTPATIENT
Start: 2022-04-27 | End: 2022-04-27 | Stop reason: HOSPADM

## 2022-04-27 RX ADMIN — KETAMINE HYDROCHLORIDE 20 MG: 50 INJECTION, SOLUTION INTRAMUSCULAR; INTRAVENOUS at 11:49

## 2022-04-27 RX ADMIN — KETAMINE HYDROCHLORIDE 30 MG: 50 INJECTION, SOLUTION INTRAMUSCULAR; INTRAVENOUS at 11:39

## 2022-04-27 RX ADMIN — MAGNESIUM SULFATE 2 G: 2 INJECTION INTRAVENOUS at 11:50

## 2022-04-27 RX ADMIN — PROPOFOL 150 MG: 10 INJECTION, EMULSION INTRAVENOUS at 11:39

## 2022-04-27 RX ADMIN — WATER 2 G: 1 INJECTION INTRAMUSCULAR; INTRAVENOUS; SUBCUTANEOUS at 11:50

## 2022-04-27 RX ADMIN — FENTANYL CITRATE 25 MCG: 0.05 INJECTION, SOLUTION INTRAMUSCULAR; INTRAVENOUS at 14:58

## 2022-04-27 RX ADMIN — SUCCINYLCHOLINE CHLORIDE 180 MG: 20 INJECTION, SOLUTION INTRAMUSCULAR; INTRAVENOUS at 11:39

## 2022-04-27 RX ADMIN — NALOXEGOL OXALATE 25 MG: 25 TABLET, FILM COATED ORAL at 10:16

## 2022-04-27 RX ADMIN — SUGAMMADEX 200 MG: 100 INJECTION, SOLUTION INTRAVENOUS at 12:54

## 2022-04-27 RX ADMIN — HYDROMORPHONE HYDROCHLORIDE 0.2 MG: 1 INJECTION, SOLUTION INTRAMUSCULAR; INTRAVENOUS; SUBCUTANEOUS at 15:29

## 2022-04-27 RX ADMIN — ONDANSETRON HYDROCHLORIDE 4 MG: 2 INJECTION, SOLUTION INTRAMUSCULAR; INTRAVENOUS at 13:11

## 2022-04-27 RX ADMIN — ROCURONIUM BROMIDE 5 MG: 10 SOLUTION INTRAVENOUS at 11:39

## 2022-04-27 RX ADMIN — FENTANYL CITRATE 25 MCG: 0.05 INJECTION, SOLUTION INTRAMUSCULAR; INTRAVENOUS at 14:52

## 2022-04-27 RX ADMIN — SODIUM CHLORIDE, SODIUM LACTATE, POTASSIUM CHLORIDE, CALCIUM CHLORIDE: 600; 310; 30; 20 INJECTION, SOLUTION INTRAVENOUS at 12:10

## 2022-04-27 RX ADMIN — ALBUMIN (HUMAN) 500 ML: 12.5 INJECTION, SOLUTION INTRAVENOUS at 11:48

## 2022-04-27 RX ADMIN — SODIUM CHLORIDE, POTASSIUM CHLORIDE, SODIUM LACTATE AND CALCIUM CHLORIDE: 600; 310; 30; 20 INJECTION, SOLUTION INTRAVENOUS at 11:25

## 2022-04-27 RX ADMIN — HYDROMORPHONE HYDROCHLORIDE 0.2 MG: 1 INJECTION, SOLUTION INTRAMUSCULAR; INTRAVENOUS; SUBCUTANEOUS at 15:44

## 2022-04-27 RX ADMIN — ONDANSETRON HYDROCHLORIDE 4 MG: 2 INJECTION, SOLUTION INTRAMUSCULAR; INTRAVENOUS at 12:35

## 2022-04-27 RX ADMIN — METRONIDAZOLE 500 MG: 500 SOLUTION INTRAVENOUS at 11:48

## 2022-04-27 RX ADMIN — DEXAMETHASONE SODIUM PHOSPHATE 4 MG: 4 INJECTION, SOLUTION INTRAMUSCULAR; INTRAVENOUS at 11:44

## 2022-04-27 RX ADMIN — MIDAZOLAM 2 MG: 1 INJECTION INTRAMUSCULAR; INTRAVENOUS at 11:25

## 2022-04-27 RX ADMIN — FENTANYL CITRATE 25 MCG: 0.05 INJECTION, SOLUTION INTRAMUSCULAR; INTRAVENOUS at 14:03

## 2022-04-27 RX ADMIN — LIDOCAINE HYDROCHLORIDE 100 MG: 20 INJECTION, SOLUTION EPIDURAL; INFILTRATION; INTRACAUDAL; PERINEURAL at 11:39

## 2022-04-27 RX ADMIN — ACETAMINOPHEN 1000 MG: 500 TABLET ORAL at 10:16

## 2022-04-27 RX ADMIN — LIDOCAINE HYDROCHLORIDE 2 MG/KG/HR: 8 INJECTION, SOLUTION INTRAVENOUS at 11:57

## 2022-04-27 RX ADMIN — PROPOFOL 50 MG: 10 INJECTION, EMULSION INTRAVENOUS at 12:11

## 2022-04-27 RX ADMIN — FENTANYL CITRATE 100 MCG: 50 INJECTION, SOLUTION INTRAMUSCULAR; INTRAVENOUS at 11:39

## 2022-04-27 RX ADMIN — SODIUM CHLORIDE, POTASSIUM CHLORIDE, SODIUM LACTATE AND CALCIUM CHLORIDE 75 ML/HR: 600; 310; 30; 20 INJECTION, SOLUTION INTRAVENOUS at 10:17

## 2022-04-27 RX ADMIN — ROCURONIUM BROMIDE 45 MG: 10 SOLUTION INTRAVENOUS at 11:45

## 2022-04-27 RX ADMIN — CELECOXIB 200 MG: 200 CAPSULE ORAL at 10:17

## 2022-04-27 RX ADMIN — FENTANYL CITRATE 25 MCG: 0.05 INJECTION, SOLUTION INTRAMUSCULAR; INTRAVENOUS at 13:46

## 2022-04-27 NOTE — ANESTHESIA POSTPROCEDURE EVALUATION
Post-Anesthesia Evaluation and Assessment    Patient: Antonio Hooker Sr. MRN: [id number]  SSN: [id number]    YOB: 1975  Age: 42 y.o. Sex: male      I have evaluated the patient and they are stable and ready for discharge from the PACU. Cardiovascular Function/Vital Signs  Visit Vitals  /84   Pulse 81   Temp 36.3 °C (97.3 °F)   Resp 14   Ht 5' 7\" (1.702 m)   Wt 63.3 kg (139 lb 9.6 oz)   SpO2 97%   BMI 21.86 kg/m²       Patient is status post General anesthesia for Procedure(s):  Diagnotic laparoscopy with peritoneal biopsy. Nausea/Vomiting: None    Postoperative hydration reviewed and adequate. Pain:  Pain Scale 1: Numeric (0 - 10) (04/27/22 0959)  Pain Intensity 1: 6 (04/27/22 0959)   Managed    Neurological Status:   Neuro (WDL): Within Defined Limits (04/27/22 1009)   At baseline    Mental Status, Level of Consciousness: Alert and  oriented to person, place, and time    Pulmonary Status:   O2 Device: Nasal cannula (04/27/22 1329)   Adequate oxygenation and airway patent    Complications related to anesthesia: None    Post-anesthesia assessment completed. No concerns    Signed By: Jacques Plata MD     April 27, 2022              Procedure(s):  Diagnotic laparoscopy with peritoneal biopsy. general    <BSHSIANPOST>    INITIAL Post-op Vital signs:   Vitals Value Taken Time   /85 04/27/22 1330   Temp 36.3 °C (97.3 °F) 04/27/22 1329   Pulse 82 04/27/22 1332   Resp 19 04/27/22 1332   SpO2 97 % 04/27/22 1332   Vitals shown include unvalidated device data.

## 2022-04-27 NOTE — H&P
Date of Surgery Update:  Anais Nichole Sr. was seen and examined. History and physical has been reviewed. The patient has been examined. There have been no significant clinical changes since the completion of the originally dated History and Physical.  Patient with colon adenocarcinoma at hepatic flexure and history of Follicular lymphoma now in remission. He had a diverting loop ileostomy at [facility] due to the obstructing nature of his right colon malignancy. He now presents for laparoscopic right colectomy with ileostomy takedown and anatomosis. A complete discussion of the risks, benefits and alternatives to surgery were discussed with the patient who was keen to proceed. The patient was counseled at length about the risks of micah Covid-19 during their perioperative period and any recovery window from their procedure. The patient was made aware that micah Covid-19  may worsen their prognosis for recovering from their procedure and lend to a higher morbidity and/or mortality risk. All material risks, benefits, and reasonable alternatives including postponing the procedure were discussed. The patient does wish to proceed with the procedure at this time.       Signed By: Dereck Walker MD     April 27, 2022 10:27 AM         Please note from the office and include the additional information below:    Past Medical History  Past Medical History:   Diagnosis Date    Anxiety     Anxiety and depression     Cancer [facility])     lymphoma Nov 2018 receiving chemo    Cancer ([facility] [address] 02/2022    COLON    Chronic pain     lower back- lymphoma    Hyperlipidemia     Hypertension     NO MEDICATION FOR PAST 9 MONTHS    Lymphadenopathy 11/12/2018    Seizures ([facility] [address] CHILDHOOD    NEVER TOOK MEDICATION - \"GREW OUT OF THEM\"        Past Surgical History  Past Surgical History:   Procedure Laterality Date    HX COLONOSCOPY      HX HEART CATHETERIZATION  02/2019    HX ILEOSTOMY      HX OTHER SURGICAL  02/2019 cardiac stent    IR INJECTION PSEUDOANEURYSM  2/26/2019    NM ABDOMEN SURGERY PROC UNLISTED  02/2022    COLON RESECTION WITH ILEOSTOMY    NM CARDIAC SURG PROCEDURE UNLIST  2019    STENT X1        Social History  The patient Ramon Thompson.  reports that he has been smoking. He has a 10.00 pack-year smoking history. He has never used smokeless tobacco. He reports that he does not drink alcohol and does not use drugs.      Family History  Family History   Problem Relation Age of Onset    Hypertension Father     Diabetes Father     Cancer Father         PROSTATE    Diabetes Mother     No Known Problems Brother     No Known Problems Brother     Anesth Problems Neg Hx           Christofer Hogan MD

## 2022-04-27 NOTE — DISCHARGE INSTRUCTIONS
Patient Discharge Instructions    Galdino Sana / [id number] : 1975    Admitted 2022 Discharged: 2022       · It is important that you take the medication exactly as they are prescribed. · Keep your medication in the bottles provided by the pharmacist and keep a list of the medication names, dosages, and times to be taken in your wallet. · Do not take other medications without consulting your doctor. What to do at Home    Recommended diet: Resume previous diet as tolerated    Recommended activity: No Heavy Lifting (Less Than 15-20 Pounds) for 2 weeks. No Driving While Taking narcotic pain medications. May Take Shower when you go home. Do not submerge incisions under water (pool, bath, hot tub) for at least 7-10 days. If you experience any of the following symptoms Fevers, Chills, Nausea, Vomitting, Redness or Drainage at Surgical Site(s) or Any Other Questions or Concerns Please Call -  [phone]. Follow-up with Dr. Jacques Edwards in ~14 days. Information obtained by :  I understand that if any problems occur once I am at home I am to contact my physician. I understand and acknowledge receipt of the instructions indicated above.                                                                                                                                            Physician's or R.N.'s Signature                                                                  Date/Time                                                                                                                                              Patient or Representative Signature                                                          Date/Time      ______________________________________________________________________    Anesthesia Discharge Instructions    After general anesthesia or intervenous sedation, for 24 hours or while taking prescription Narcotics:  · Limit your activities  · Do not drive or operate hazardous machinery  · If you have not urinated within 8 hours after discharge, please contact your surgeon on call. · Do not make important personal or business decisions  · Do not drink alcoholic beverages    Report the following to your surgeon:  · Excessive pain, swelling, redness or odor of or around the surgical area  · Temperature over 100.5 degrees  · Nausea and vomiting lasting longer than 4 hours or if unable to take medication  · Any signs of decreased circulation or nerve impairment to extremity:  Change in color, persistent numbness, tingling, coldness or increased pain.   · Any questions

## 2022-04-27 NOTE — PERIOP NOTES
Spoke with patients girlfriend who is patients ride home. Because she was expecting patient to be admitted she will not be able to pick patient up until 3:45.

## 2022-04-27 NOTE — PERIOP NOTES
Patient: Jaqui Negrete Sr. MRN: [id number]  SSN: [id number]   YOB: 1975  Age: 42 y.o. Sex: male     Patient is status post Procedure(s):  Diagnotic laparoscopy with peritoneal biopsy. Surgeon(s) and Role:     * Kathy Chavarria MD - Primary    Local/Dose/Irrigation:  30 ml 0.25% marcaine with epi                Venous Access Device Bard PowerPort 18 Upper chest (subclavicular area, right (Active)      Peripheral IV 22 Left Arm (Active)   Site Assessment Clean, dry, & intact 22 1008   Phlebitis Assessment 0 22 1008   Dressing Status Clean, dry, & intact 22 1008   Dressing Type Transparent 22 1008   Hub Color/Line Status Blue; Infusing 22 1008       Peripheral IV 22 Right Wrist (Active)            Airway - Endotracheal Tube 22 (Active)       Airway - Endotracheal Tube 22 (Active)                   Dressing/Packin trocar sites with dermabond. Splint/Cast:  ]    Other:  Pt has ileostomy.

## 2022-04-27 NOTE — PROGRESS NOTES
Received call from Dr. Lilly Angulo in [facility]:  Diagnostic laparoscopy done rather than colon resection. Intraoperatively, patient was found to have metastatic disease in peritoneum consistent with adenocarcinoma rather than lymphoma. Colon mass is large and fixed to peritoneum. Therefore, Dr. Lilly Angulo does not recommend removal of primary tumor at this time and did not reverse ileostomy. Liver and small bowel mesentery appear normal.     Agree and discussed with Dr. Lilly Angulo that pt still has port and will need chemotherapy. He would also benefit from [facility] testing. Will have patient follow up in our office next week.

## 2022-04-27 NOTE — ANESTHESIA PREPROCEDURE EVALUATION
Anesthetic History   No history of anesthetic complications            Review of Systems / Medical History  Patient summary reviewed, nursing notes reviewed and pertinent labs reviewed    Pulmonary  Within defined limits                 Neuro/Psych     seizures         Cardiovascular    Hypertension: well controlled          CAD         GI/Hepatic/Renal  Within defined limits              Endo/Other        Cancer     Other Findings   Comments: Adenocancer           Physical Exam    Airway  Mallampati: II  TM Distance: > 6 cm  Neck ROM: normal range of motion   Mouth opening: Normal     Cardiovascular  Regular rate and rhythm,  S1 and S2 normal,  no murmur, click, rub, or gallop             Dental    Dentition: Poor dentition  Comments: Multiple and black decayed teeth. Discussed with pt the increased likelihood of a tooth coming out.   Pt expressed understanding   Pulmonary  Breath sounds clear to auscultation               Abdominal  GI exam deferred       Other Findings            Anesthetic Plan    ASA: 2  Anesthesia type: general          Induction: Intravenous  Anesthetic plan and risks discussed with: Patient

## 2022-04-27 NOTE — PERIOP NOTES
I have reviewed discharge instructions with the patient. The patient verbalized understanding. Signs, symptoms, and side effects reviewed. Opportunity for questions and clarification allowed. Patient discharging home via private vehicle. Hard-copy of discharge instructions given to patient.

## 2022-04-28 ENCOUNTER — TELEPHONE (OUTPATIENT)
Dept: ONCOLOGY | Age: 47
End: 2022-04-28

## 2022-04-28 NOTE — TELEPHONE ENCOUNTER
Patient called and stated that he could not have surgery and wants to be seen sooner then may 17 because they told him he will chemo now.  Told patient there was no opening and would ask the team  IG#[phone]

## 2022-04-28 NOTE — PROGRESS NOTES
[id number] [facility] at [facility]  [phone]    Hematology / Oncology Established Visit    Reason for Visit:   Dominique Montana is a 42 y.o. male who is seen for urgent follow up of colon cancer, h/o lymphoma. Hematology Oncology Treatment History:     Diagnosis #1: Follicular lymphoma  Stage: IV  Pathology:   11/13/18 right inguinal LN excision: Follicular lymphoma, high-grade (grade 3a of 3). Prior Treatment:   1. Obinutuzumab-CHOP. Obinutuzumab: 1000 mg weekly on days 1, 8, 15 for cycle 1, then 1000 mg on day 1 q21 days for cycles 2-6, then monotherapy 1000 mg every 21 days for cycle 7, 8 with Cyclophosphamide 750mg/m2, Doxorubicin 50mg/m2, Vincristine 1.4mg/m2 on day 1 and Prednisone 100mg on Days 1-5, every 21 days for a total of 2 cycles completed late 1/2019. Regimen discontinued due to STEMI. 2. Obinutuzumab + Bendamustine: 1000 mg Obinutuzumab on day 1 + Bendamustine 90mg/m2 on days 1-2 on a 28-day cycle x 4 cycles, completed 5/2019  Current Treatment: Surveillance      Diagnosis #2: Colon cancer:  Stage: pending  Pathology:  Ascending colon; biopsy: poorly differentiated carcinoma  Comment: No dysplasia is identified. Immunohistochemical stains performed on block 1A, show the tumor positive for Cam5.2 and shows patchy positivity for CDX2 with a Ki-67 index of -90%; the tumor is focally positive for CK5/6 and GATA3; negative for p63, Pax8, CK7, CK20, panmelanoma, synaptophysin and chromogranin. The immunophenotypic features are nonspecific but argues somewhat against the following carcinoma: urothelial, neuroendocrine and breast. The immunophenotypic features also argues against melanoma and somewhat against classic colorectal carcinoma. Additional immunohistochemical stains to exclude the possibility of prostate and hepatocellular carcinoma have been   ordered; the results will be reported as an addendum.   MLH1/MSH2/MSH6/PMS2 all intact with no loss of nuclear expression of MMR proteins - no MSI   Addendum: The addendum is issued to report the results of additional immunohistochemical stains in an attempt to ascertain the primary site of the carcinoma. The diagnosis is unchanged. Hepar and glypican 3 immunohistochemical stains are neg, arguing against hepatocellular carcinoma. PSA stain is negative, arguing against prostatic primary. Imaging studies to eval for poss primary sites maybe useful. In the absence of carcinoma/tumor at any other sites, this may represent an undifferentiated carcinoma of the colon. Oncogenomics (molecular) studies: POLE< ARID1A, YVONNE, ATR, BRCA2, CHECK1, FANCI, NF1, PIK3CA, PTCH1, PTEN, RAD50, RAD51, RB1, SMARCA4, STK11    Prior Treatment: Loop ileostomy 2/19/22  Current Treatment: planned for R hemicolectomy, ileostomy reversal 4/27/22       Oncologic History:  Was in his usual state of health in early November 2018 when he developed low back pain and presented to the ER. Work-up at outside hospital revealed a retroperitoneal mass seen on CT imaging, and he was transferred to [facility] for further work-up. CT there showed a large retroperitoneal mass encircling the aorta with invasion of the left renal hilum and left adrenal gland. There were bilateral inguinal lymph nodes and moderate left hydronephrosis. He was evaluated while at [facility] and was noted to have palpable nodes in his groin for approximately the past 1 month. He underwent excisional LN biopsy of right inguinal LN, which revealed follicular lymphoma. At time of diagnosis, he had no cardiac disease aside from HTN, hyperlipidemia. However, he did have an NSTEMI after cycle 2 of O-CHOP. Was likely unrelated to chemotherapy, but opted to switch treatment to Obinutuzumab-Bendamustine. He completed treatment in 5/2019 and had a CR based on PET. History of Present Illness:   Mr. Davin Basurto is a 42 y.o. male with prior h/o follicular lymphoma is seen for follow up of colon cancer. He completed a laparoscopic right colectomy - moderate burden of peritoneal carcinomatosis with nodules along the diaphragm, abdominal peritoneum, and several small nodules along the distal small bowel near the ileostomy. He is eating and drinking well. Denies any current constipation or diarrhea. Continues to have RLQ pain and is on Oxycodone with Dr. Christine Rose. PMHx: Lymphoma in 2018, CAD, HTN, Hyperlipidemia, Anxiety, chronic low back pain  PSurgHx: None aside from cardiac stent placement and port placement  SHx: Smokes 1/2ppd x past 20 yrs. Works part time as a cook. Has 2 children. FHx: Father had leukemia, passed away from pancreatic cancer. Review of Systems: A complete review of systems was obtained, negative except as described above. Physical Exam:     Visit Vitals  BP (!) 118/90   Pulse 91   Temp 97.5 °F (36.4 °C) (Temporal)   Resp 16   Ht 5' 7\" (1.702 m)   Wt 135 lb (61.2 kg)   SpO2 99%   BMI 21.14 kg/m²       ECOG PS: 0  General: no distress  Eyes: anicteric sclerae  HENT: oropharynx clear  Neck: supple  Lymphatic: no cervical, supraclavicular adenopathy  Respiratory: normal respiratory effort  CV: no peripheral edema  GI: soft, nontender, nondistended, no masses; + TTP to RLQ, colostomy in place with green/brown stool,  Skin: no rashes; no ecchymoses; no petechiae      Results:     Lab Results   Component Value Date/Time    WBC 13.2 (H) 05/06/2022 11:13 AM    HGB 13.0 05/06/2022 11:13 AM    HCT 42.3 05/06/2022 11:13 AM    PLATELET 283 (H) 45/03/8084 11:13 AM    MCV 80.4 05/06/2022 11:13 AM    ABS.  NEUTROPHILS 9.9 (H) 05/06/2022 11:13 AM    Hemoglobin (POC) 15.0 06/05/2009 02:13 PM    Hematocrit (POC) 39 02/14/2019 01:24 PM     Lab Results   Component Value Date/Time    Sodium 132 (L) 05/06/2022 11:13 AM    Potassium 3.8 05/06/2022 11:13 AM    Chloride 100 05/06/2022 11:13 AM    CO2 22 05/06/2022 11:13 AM    Glucose 87 05/06/2022 11:13 AM    BUN 8 05/06/2022 11:13 AM    Creatinine 1.25 2022 11:13 AM    GFR est AA >60 2022 11:13 AM    GFR est non-AA >60 2022 11:13 AM    Calcium 9.7 2022 11:13 AM    Sodium (POC) 136 2019 01:24 PM    Potassium (POC) 3.9 2019 01:24 PM    Chloride (POC) 102 2019 01:24 PM    Glucose (POC) 249 (H) 02/15/2019 10:21 PM    BUN (POC) 14 2019 01:24 PM    Creatinine (POC) 0.9 2019 01:24 PM    Calcium, ionized (POC) 1.24 2019 01:24 PM     Lab Results   Component Value Date/Time    Bilirubin, total 0.3 2022 11:13 AM    ALT (SGPT) 16 2022 11:13 AM    Alk. phosphatase 131 (H) 2022 11:13 AM    Protein, total 8.0 2022 11:13 AM    Albumin 3.0 (L) 2022 11:13 AM    Globulin 5.0 (H) 2022 11:13 AM     Lab Results   Component Value Date/Time    Iron 17 (L) 02/15/2022 02:33 PM    TIBC 346 02/15/2022 02:33 PM    Iron % saturation 5 (L) 02/15/2022 02:33 PM    Ferritin 16 (L) 02/15/2022 02:33 PM       No results found for: B12LT, FOL, RBCF  Lab Results   Component Value Date/Time    TSH 1.53 2016 04:40 AM       Lab Results   Component Value Date/Time    Hepatitis A, IgM NONREACTIVE 2018 04:53 PM    Hepatitis B surface Ag <0.10 2018 04:53 PM    Hepatitis B core, IgM NONREACTIVE 2018 04:53 PM       18:       Labs at [facility]:  22: CA 19-9 = 390  22: CEA = 12.3    Imagin/9/18 Abd/pelvis CT: IMPRESSION:  1. Interval development of a large retroperitoneal mass encircling the aorta with invasion of the left renal hilum and left adrenal gland. Several adjacent lymph nodes are seen extending into the peritoneum and underneath the  diaphragmatic natalie. This most likely represents lymphoma. 2. Several new bilateral enlarged inguinal lymph nodes also likely representing lymphoma. 3. Moderate left hydronephrosis with a delayed renal nephrogram related to decreased renal function. This is related to the invasion of the renal hilum.     18 Chest CT: IMPRESSION:  Trace left pleural effusion. Bilateral lower lobe atelectasis. Large  retroperitoneal mass lesion again demonstrated. PET 12/18/18:  FINDINGS:  HEAD/NECK: Right palatine tonsil intense hypermetabolism, max SUV 18. Multilevel  bilateral cervical adenopathy, with max SUV 12 in a left supraclavicular node. Cerebral evaluation is limited by normal intense activity. CHEST: Solitary hypermetabolic left axillary node, max SUV 11. ABDOMEN/PELVIS: Bulky retroperitoneal mass max SUV 27, with several additional  small active abdomino-pelvic nodes. Bilateral inguinal nodes with max SUV 12 on  the left. SKELETON: No foci of abnormal hypermetabolism in the axial and visualized  appendicular skeleton. IMPRESSION:   1. Right palatine tonsil tumor involvement (Deauville 5). 2. Bilateral cervical delaney involvement (Deauville 5). 3. Left axillary node involvement (Deauville 5). 4. Bulky retroperitoneal lymphoma mass and additional smaller hypermetabolic  abdomino-pelvic nodes (Deauville 5). 5. Bilateral inguinal delaney involvement (Deauville 5). Deauville Five Point Scale  1. No uptake or no residual uptake (when used interim)  2. Slight uptake, but below blood pool (mediastinum)  3. Uptake above mediastinal, but below/equal to uptake in the liver  4. Uptake slightly to moderately higher than liver  5. Markedly increased uptake or any new lesion (on response evaluation)  Each FDG-avid (or previously FDG avid) lesion is rated independently. Reference values:  Mediastinal blood pool: 2.1 SUV  Liver (background): 2.2 SUV    PET/CT 2/05/19:   IMPRESSION:  1. No Foci of Abnormal Hypermetabolism (Deauville 1). 2. Resolved activity in the right palatine tonsil, bilateral cervical nodes,left axillary node, retroperitoneal/abdominal pelvic adenopathy, bilateral inguinal nodes. Echo 2/14/19:  Normal cavity size, wall thickness and systolic function (ejection fraction normal).  The muscle mass is normal. The cavity shape is normal. The estimated ejection fraction is 41 - 45%. Abnormal wall motion as described on the wall scoring diagram below. End-systolic volume is normal. Normal left ventricular strain. There is mild (grade 1) left ventricular diastolic dysfunction. Normal left ventricular diastolic pressure. End-diastolic volume is normal.    LE arterial duplex 2/22/19:  There is evidence of left groin pseudoaneurysm noted arising from distal common femoral artery, pseudo lobe measures 2.32cm x 2.58cm and pseudo neck length measuring 0.63cm. There is no evidence of hemodynamically significant left lower extremity arterial obstruction. JACLYN is 1.03 on the right and 1.02 on the left. LE arterial duplex s/p Thrombin Injection to Pseudoaneurysm 2/26/19:  Successful thrombin injection procedure of the left groin with no further flow seen. No evidence of hemodnyamically significant obstruction in the left lower extremity. Left lower extremity arterial duplex performed. Confirmed pseudoaneurysm in left groin with small neck. Following thrombin injection, no further flow seen in the pseudoaneurysm. The left common femoral, profunda femoral, femoral, popliteal, posterior tibial and anterior arteries were imaged. Mainly triphasic flow was seen with no evidence of significantly elevated velocities. Repeat LE arterial duplex 2/27/19:  Continued thrombosed left groin pseudoaneurysm following thrombin injection on 02/26/2019. No flow or color fill is identified. The hematoma measures approximately 2.1 x 2.9 cm in diameter. The common femoral, deep femoral, femoral, and popliteal arteries are patent with mainly tri-phasic flow and no significant hemodynamically obstruction is noted. Stress 5/31/19:  · Normal stress myocardial perfusion without ischemia or infact at 84% MPHR. Normal LV function. LVEF 60%. · No EKG changes of ischemia at peak exercise. · Normal functional capacity.     PET 6/03/19: IMPRESSION: No Foci of Abnormal Hypermetabolism (Deauville 1). -MRI lumbar spine 12/18/19:  Mild disc degenerative change at L3-4 and L4-5. Mild canal stenosis at L3-4 and mild left foraminal stenosis at L4-5. Other less severe degenerative findings are as described above. Continued diminished size of retroperitoneal mass-adenopathy,  with diminished soft tissue density at the left renal hilum. -CT chest/abdomen 12/16/19:  Findings are consistent with interval response to therapy    CT c/a/p 5/28/20: IMPRESSION:  Further contraction of the retroperitoneal mantle and chris mesentery,  compatible with treatment response  Stable left hilar soft tissue mass  Slight increased splaying of the duodenum and proximal jejunum is the result, without obstruction  No evidence for recurrence of lymphoma in the chest, abdomen, or pelvis    CT c/a/p 2/13/22:  FINDINGS:   LOWER THORAX: Dependent atelectasis with otherwise clear lungs. The visualized  heart is normal in size without pericardial effusion. LIVER: No mass. BILIARY TREE: Gallbladder is unremarkable. CBD is not dilated. SPLEEN: Unremarkable. PANCREAS: No mass or ductal dilatation. ADRENALS: Unremarkable. KIDNEYS: Atrophic left kidney with mild left hydronephrosis. Right kidney is  unremarkable with no stone, enhancing mass, or other renal abnormality. STOMACH: Unremarkable. SMALL BOWEL: No dilatation or wall thickening. COLON: Circumferential wall thickening at the hepatic flexure with luminal  narrowing, adjacent mesenteric stranding, and fluid with upstream retention in  the cecum and fecalization of distal ileal contents. No dilation or other wall  thickening. APPENDIX: Unremarkable. PERITONEUM: Moderate free fluid with no pneumoperitoneum. RETROPERITONEUM: Soft tissue stranding around the celiac and SMA and associated aorta with no discrete mass or adenopathy. Aorta is normal in size without aneurysm or dissection.   REPRODUCTIVE ORGANS: Prostate and seminal vesicles appear unremarkable. URINARY BLADDER: No mass or calculus. BONES: No destructive bone lesion. ABDOMINAL WALL: No mass or hernia. ADDITIONAL COMMENTS: N/A  IMPRESSION  1. Annular mass lesion of the right colon with upstream fecal retention  concerning for primary colon neoplasm versus less likely lymphoma. 2. Soft tissue stranding around celiac axis, SMA, and abdominal aorta may  reflect infiltrative lymphoma. 3. Left renal atrophy with left hydronephrosis likely reflecting chronic  proximal ureteral obstruction. 4. Incidentals as above. 2/24/22 CT abd/pelvis at [facility]:  FINDINGS: An enteric tube with sidehole beyond the level of the lower esophageal sphincter is seen looping on itself in the proximal stomach before terminating in the mid stomach. Status post right lower quadrant diverting ileostomy for an obstructing colonic mass. Multiple loops of gas dilated small bowel remain, with a maximal diameter of 5.4 cm whereas previously measured 3.6 cm. Dilute contrast is seen within the lateral left abdominal dilated small bowel. Moderate stool burden and bowel gas opacifies the cecum, measuring 7.3 cm in diameter.    No definite supine evidence of pneumoperitoneum. Lung bases: Limited evaluation of the lung bases demonstrates a cardiac silhouette within normal limits for size. A small bore central venous catheter is seen terminating in the right atrium. No focal consolidation or pleural effusion. 2/24/22 CT abd/pelvis [facility]:  IMPRESSION:  1. Status post right lower quadrant loop ileostomy. Mild diffuse dilation of small bowel proximal to the ostomy in the lower abdomen and upper pelvis concerning for at least mild to moderate partial bowel obstruction. Relatively decompressed loop ileostomy. 2. Mildly complex pelvic fluid collection in the rectovesical space, decreased in size from prior.   3. Redemonstrated ascending colon mass and findings suspicious for regional metastatic disease. 4. Redemonstrated soft tissue in the retroperitoneum with prominent lymph nodes, similar to prior examination. Differential would include metastasis, retroperitoneal fibrosis. 5. Mild atherosclerosis. 6. Subcentimeter right renal hypodensity, too small to characterize. 7. Severe compression of the left renal vein, similar to prior examination. 8. Additional findings as described above. Bones: No acute osseous abnormality. 2/24/22 CT chest [facility]:  IMPRESSION:  FINAL report. 1. No evidence of metastatic disease to the chest.  2. No enlarged lymph nodes. 3. Increasing volume loss in the lung bases which may be due to atelectasis. 4. CT abdomen and pelvis reported separately.     2/25/22 Colonoscopy at [facility]:  Impression:            - Preparation of the colon was inadequate.                        - Stool in the entire examined colon.                        - Likely malignant completely obstructing tumor at the                         hepatic flexure. Biopsied.                        - Malignant-appearing tumor in the colon. Complications:         No immediate complications. Assessment & Plan:   Elfego Brunson. is a 42 y.o. male comes in for evaluation and management of lymphoma. 1. Undifferentiated carcinoma of transverse colon, KRAS/NRAS wild type:  S/p diverting ileostomy due to large bowel obstruction. Colonoscopy with biopsy on 2/25/22. No obvious metastatic disease on CT imaging, but did have obvious metastatic disease to peritoneum during laparoscopy with Dr. Inder Tam. I recommend   -- Signatera testing today. -- Check CEA on date of first chemo  -- Discussed FOLFOXIRI every 2 weeks. Will not give Bevacizumab given h/o MRI and tobacco use. -- Consider repeat colonoscopy in 6 months given incomplete colonoscopy. -- Return on 5/16/22 for C1 of FOLFOXIRI. 2. H/o Follicular lymphoma: Grade 3a with with bulky disease encircling the aorta, causing pain.  Bone marrow negative for lymphoma, but was hypercellular. BR better than RCHOP, but based on GALLIUM study, Obinutuzumab-based induction and maintenance prolongs PFS over that seen with rituximab-based therapy. Therefore, pt started on O-CHOP regimen. He received 2 cycles with CR and was then switched to BR x 4 cycles given STEMI. Completed treatment 5/2019. PET completed 6/3/19 showed CR. CT 5/28/20 negative for disease. No extra surveillance needed at this time given metastatic colon cancer with frequent imaging. 3. Chronic lumbar back pain/anxiety/RLQ: Left lower back pain is chronic/stable and RLQ is likely related to neoplasm/colostomy - currently managing pain on Oxycodone and Lexapro daily. Signed pain contract on 12/28/18 and following with Dr. Umberto Solorio. 4. CAD: h/o STEMI 2/14/29 with TOM placed to proximal LAD. Following with Dr. Martín Coffman and remains on dual antiplatelet therapy, high dose Lipitor, Metoprolol, ACEI. Received only 2 doses of cardiotoxic chemo, Doxorubicin in early 1/2019. Is overdue for follow up with Dr. Martín Coffman. 5. Tobacco abuse: Discussed benefits of quitting smoking again. Previously discussed replacing hand-to-mouth habit with another item such as Twizzlers or SlimJims. 6. HTN: Well controlled on lisinopril. Managed by PCP. 7. BRCA2 mutation:  Will discuss with patient in future. He would benefit from genetic counseling for himself and his family. Goals of care: Disease is not curable, but is treatable to improve quality and duration of life.      Signed By: Ebenezer Garcia MD     May 6, 2022

## 2022-04-28 NOTE — OP NOTES
[address]  OPERATIVE REPORT    Name:  Padmini Orozco  MR#:  [id number]  :  1975  ACCOUNT #:  [de-identified]  DATE OF SERVICE:  2022      PREOPERATIVE DIAGNOSES:  1. Transverse colon adenocarcinoma. 2.  Diverting loop ileostomy. POSTOPERATIVE DIAGNOSES:  1. Transverse colon adenocarcinoma. 2.  Diverting loop ileostomy. 3.  Peritoneal carcinomatosis. PROCEDURE PERFORMED:  Diagnostic laparoscopy with peritoneal biopsy. SURGEON:  Aj House. Eli Dietz MD    ASSISTANT:  EVELIA Hui.  Jae Wu assistance was required secondary to the complex nature of this operation. She assisted throughout with operation of the camera, retraction, exposure, and closure. ANESTHESIA:  General.    COMPLICATIONS:  None. SPECIMENS REMOVED:  Peritoneal nodule. Disposition of specimen to Pathology. IMPLANTS:  None. ESTIMATED BLOOD LOSS:  10 mL. DISPOSITION:  PACU. FINDINGS:  The patient had a moderate burden of peritoneal carcinomatosis with nodules along the diaphragm, abdominal peritoneum, and several small nodules along the distal small bowel near the ileostomy. There was no obvious liver metastatic disease noted. There was no ascites. The primary colonic mass was a large tumor at the hepatic flexure that was fixed to the retroperitoneum and had several loops of adherent small bowel involved with it as well. The duodenum was unable to be visualized. There was no evidence of small-bowel obstruction. The ileostomy was left in place at the completion of the case. INDICATIONS:  The patient is a 44-year-old gentleman who presented with colonic obstruction at an outside institution and underwent a laparoscopic diverting loop ileostomy. There was concern that he had more advanced disease around the aorta given his history of follicular lymphoma.   However, staging and followup imaging revealed this to be stable or improved after treatment consistent with remission of his lymphoma. No obvious distant metastatic disease was noted and so recommendation was made for an attempted laparoscopic right colectomy with reversal of his ileostomy. A complete discussion of risks, benefits, and alternatives of surgery was had with the patient, and he was in agreement to proceed. Informed consent was obtained. DESCRIPTION OF THE OPERATION:  After informed consent was obtained, the patient was brought back to the operating room. He was placed under general endotracheal anesthesia in the supine position on the operating room table. His left arm was tucked and a Cooley catheter was placed. His ileostomy was sutured closed using a 2-0 silk suture. He was then prepped and draped in the usual sterile fashion and a proper time-out was performed. With this completed, we made a 5-mm vertical incision just above the umbilicus. We dissected down to the base of the umbilicus using a Kocher clamp and this was grasped and retracted anteriorly. A Veress needle was then passed into the abdomen at this site and a standard water drop technique test was performed. The abdomen was then insufflated to 15 mmHg. The Veress needle was then removed and a 5-mm Optiview trocar was used to tunnel into the abdomen. The abdomen was entered safely. Upon placement of the trocar, it appeared that there were signs of peritoneal metastatic disease. It was not yet clear the extent of the primary tumor burden or whether this metastatic disease could potentially represent lymphoma and thus I placed additional trocars in order to further explore the abdomen. A left lower quadrant 12-mm trocar was placed as well as a 5-mm suprapubic trocar and an additional 5-mm epigastric trocar. These were all placed after injection of local anesthetic and under direct visualization.   There were a few band-like adhesions up to the anterior abdominal wall from the omentum that were taken down with a laparoscopic LigaSure device. At this point, we used a LigaSure to remove one of the peritoneal nodules along the anterior abdominal wall just distal to the region of the costal margin. This was done by scoring the peritoneum circumferentially around the nodule and this was then removed using a whale grasper through the 12-mm trocar. The nodule was sent off to Pathology and this was confirmed to be metastatic adenocarcinoma consistent with a colon primary. While we were awaiting on frozen section, we further evaluated the area of the primary tumor. This was at the hepatic flexure and there were several loops of small bowel that were adherent to the mass. Additionally, the mass felt like it was fixed to the retroperitoneum and was completely immobile. I was not able to visualize the area of the duodenum. Given the advanced nature of the mass locally, I did not think it was reasonable to consider doing a palliative resection of the primary in order to reverse his ileostomy as this would have very significant morbidity and mortality risks. Additionally, the patient had a moderate volume of carcinomatosis and I felt his best treatment option would be to get into the point of chemotherapy as soon as possible. We took several photographs with the laparoscopic camera to include in the patient's record and then prepared for closure. The left lower quadrant 12-mm trocar site was closed using an 0 Vicryl suture on a suture passer and similarly the supraumbilical trocar site was closed using an 0 Vicryl suture on a suture passer. This area had some herniation in the abdominal wall from his recent ileostomy surgery. This nicely closed the defect. No other abdominal wall herniation was noted. The ileostomy appeared healthy and was left intact. The remaining trocars were removed after the abdomen was desufflated. Additional local anesthetic was injected at all incision sites.   The skin incisions were all closed using 4-0 Monocryl subcuticular stitch followed by Dermabond skin adhesive. The sutures closing the ileostomy were removed and then fresh ileostomy appliance was placed. The patient was then awakened, extubated, and taken to the postanesthesia care unit in stable condition. All needle and instrument counts were correct at the completion of the case. I was present and scrubbed throughout the entirety of the case. There were no immediate complications.         Obie Delvalle MD      [id number]  D:  04/27/2022 13:11  T:  04/28/2022 0:36  JOB #:  [id number]

## 2022-05-06 ENCOUNTER — TELEPHONE (OUTPATIENT)
Dept: ONCOLOGY | Age: 47
End: 2022-05-06

## 2022-05-06 ENCOUNTER — HOSPITAL ENCOUNTER (OUTPATIENT)
Dept: INFUSION THERAPY | Age: 47
Discharge: HOME | End: 2022-05-06
Payer: COMMERCIAL

## 2022-05-06 ENCOUNTER — OFFICE VISIT (OUTPATIENT)
Dept: ONCOLOGY | Age: 47
End: 2022-05-06
Payer: COMMERCIAL

## 2022-05-06 VITALS
HEART RATE: 91 BPM | TEMPERATURE: 97.5 F | WEIGHT: 135 LBS | DIASTOLIC BLOOD PRESSURE: 90 MMHG | BODY MASS INDEX: 21.19 KG/M2 | RESPIRATION RATE: 16 BRPM | HEIGHT: 67 IN | SYSTOLIC BLOOD PRESSURE: 118 MMHG | OXYGEN SATURATION: 99 %

## 2022-05-06 VITALS
RESPIRATION RATE: 16 BRPM | DIASTOLIC BLOOD PRESSURE: 90 MMHG | OXYGEN SATURATION: 99 % | HEART RATE: 91 BPM | TEMPERATURE: 97.5 F | SYSTOLIC BLOOD PRESSURE: 118 MMHG

## 2022-05-06 DIAGNOSIS — D50.0 IRON DEFICIENCY ANEMIA DUE TO CHRONIC BLOOD LOSS: ICD-10-CM

## 2022-05-06 DIAGNOSIS — Z15.09 BRCA2 GENE MUTATION POSITIVE IN MALE: ICD-10-CM

## 2022-05-06 DIAGNOSIS — C18.4 CARCINOMA OF TRANSVERSE COLON (HCC): Primary | ICD-10-CM

## 2022-05-06 DIAGNOSIS — Z85.72 HISTORY OF FOLLICULAR LYMPHOMA: ICD-10-CM

## 2022-05-06 DIAGNOSIS — Z15.01 BRCA2 GENE MUTATION POSITIVE IN MALE: ICD-10-CM

## 2022-05-06 DIAGNOSIS — Z72.0 TOBACCO ABUSE: ICD-10-CM

## 2022-05-06 DIAGNOSIS — R10.31 RLQ ABDOMINAL PAIN: ICD-10-CM

## 2022-05-06 DIAGNOSIS — Z15.03 BRCA2 GENE MUTATION POSITIVE IN MALE: ICD-10-CM

## 2022-05-06 DIAGNOSIS — C18.9 COLON ADENOCARCINOMA (HCC): ICD-10-CM

## 2022-05-06 DIAGNOSIS — C82.90 FOLLICULAR LYMPHOMA, UNSPECIFIED FOLLICULAR LYMPHOMA TYPE, UNSPECIFIED BODY REGION (HCC): Primary | ICD-10-CM

## 2022-05-06 LAB
ALBUMIN SERPL-MCNC: 3 G/DL (ref 3.5–5)
ALBUMIN/GLOB SERPL: 0.6 {RATIO} (ref 1.1–2.2)
ALP SERPL-CCNC: 131 U/L (ref 45–117)
ALT SERPL-CCNC: 16 U/L (ref 12–78)
ANION GAP SERPL CALC-SCNC: 10 MMOL/L (ref 5–15)
AST SERPL-CCNC: 13 U/L (ref 15–37)
BASOPHILS # BLD: 0.1 K/UL (ref 0–0.1)
BASOPHILS NFR BLD: 1 % (ref 0–1)
BILIRUB SERPL-MCNC: 0.3 MG/DL (ref 0.2–1)
BUN SERPL-MCNC: 8 MG/DL (ref 6–20)
BUN/CREAT SERPL: 6 (ref 12–20)
CALCIUM SERPL-MCNC: 9.7 MG/DL (ref 8.5–10.1)
CHLORIDE SERPL-SCNC: 100 MMOL/L (ref 97–108)
CO2 SERPL-SCNC: 22 MMOL/L (ref 21–32)
CREAT SERPL-MCNC: 1.25 MG/DL (ref 0.7–1.3)
DIFFERENTIAL METHOD BLD: ABNORMAL
EOSINOPHIL # BLD: 0.2 K/UL (ref 0–0.4)
EOSINOPHIL NFR BLD: 2 % (ref 0–7)
ERYTHROCYTE [DISTWIDTH] IN BLOOD BY AUTOMATED COUNT: 17.2 % (ref 11.5–14.5)
FERRITIN SERPL-MCNC: 426 NG/ML (ref 26–388)
GLOBULIN SER CALC-MCNC: 5 G/DL (ref 2–4)
GLUCOSE SERPL-MCNC: 87 MG/DL (ref 65–100)
HCT VFR BLD AUTO: 42.3 % (ref 36.6–50.3)
HGB BLD-MCNC: 13 G/DL (ref 12.1–17)
IMM GRANULOCYTES # BLD AUTO: 0.1 K/UL (ref 0–0.04)
IMM GRANULOCYTES NFR BLD AUTO: 1 % (ref 0–0.5)
IRON SATN MFR SERPL: 10 % (ref 20–50)
IRON SERPL-MCNC: 18 UG/DL (ref 35–150)
LYMPHOCYTES # BLD: 1.8 K/UL (ref 0.8–3.5)
LYMPHOCYTES NFR BLD: 14 % (ref 12–49)
MCH RBC QN AUTO: 24.7 PG (ref 26–34)
MCHC RBC AUTO-ENTMCNC: 30.7 G/DL (ref 30–36.5)
MCV RBC AUTO: 80.4 FL (ref 80–99)
MONOCYTES # BLD: 1.1 K/UL (ref 0–1)
MONOCYTES NFR BLD: 8 % (ref 5–13)
NEUTS SEG # BLD: 9.9 K/UL (ref 1.8–8)
NEUTS SEG NFR BLD: 74 % (ref 32–75)
NRBC # BLD: 0 K/UL (ref 0–0.01)
NRBC BLD-RTO: 0 PER 100 WBC
PLATELET # BLD AUTO: 514 K/UL (ref 150–400)
PMV BLD AUTO: 9.8 FL (ref 8.9–12.9)
POTASSIUM SERPL-SCNC: 3.8 MMOL/L (ref 3.5–5.1)
PROT SERPL-MCNC: 8 G/DL (ref 6.4–8.2)
RBC # BLD AUTO: 5.26 M/UL (ref 4.1–5.7)
SODIUM SERPL-SCNC: 132 MMOL/L (ref 136–145)
TIBC SERPL-MCNC: 173 UG/DL (ref 250–450)
WBC # BLD AUTO: 13.2 K/UL (ref 4.1–11.1)

## 2022-05-06 PROCEDURE — 74011000250 HC RX REV CODE- 250: Performed by: INTERNAL MEDICINE

## 2022-05-06 PROCEDURE — 80053 COMPREHEN METABOLIC PANEL: CPT

## 2022-05-06 PROCEDURE — 77030016057 HC NDL HUBR APOL -B

## 2022-05-06 PROCEDURE — 83540 ASSAY OF IRON: CPT

## 2022-05-06 PROCEDURE — 82728 ASSAY OF FERRITIN: CPT

## 2022-05-06 PROCEDURE — 99214 OFFICE O/P EST MOD 30 MIN: CPT | Performed by: INTERNAL MEDICINE

## 2022-05-06 PROCEDURE — 96523 IRRIG DRUG DELIVERY DEVICE: CPT

## 2022-05-06 PROCEDURE — 36415 COLL VENOUS BLD VENIPUNCTURE: CPT

## 2022-05-06 PROCEDURE — 85025 COMPLETE CBC W/AUTO DIFF WBC: CPT

## 2022-05-06 PROCEDURE — 74011250636 HC RX REV CODE- 250/636: Performed by: INTERNAL MEDICINE

## 2022-05-06 PROCEDURE — 36593 DECLOT VASCULAR DEVICE: CPT

## 2022-05-06 RX ORDER — AMOXICILLIN 250 MG
1 CAPSULE ORAL
Qty: 30 TABLET | Refills: 3 | Status: SHIPPED | OUTPATIENT
Start: 2022-05-06 | End: 2022-10-03

## 2022-05-06 RX ORDER — DEXAMETHASONE 4 MG/1
TABLET ORAL
Qty: 30 TABLET | Refills: 3 | Status: SHIPPED | OUTPATIENT
Start: 2022-05-06

## 2022-05-06 RX ORDER — SODIUM CHLORIDE 0.9 % (FLUSH) 0.9 %
10 SYRINGE (ML) INJECTION AS NEEDED
Status: CANCELLED | OUTPATIENT
Start: 2022-05-16

## 2022-05-06 RX ORDER — SODIUM CHLORIDE 9 MG/ML
25 INJECTION, SOLUTION INTRAVENOUS CONTINUOUS
Status: CANCELLED
Start: 2022-05-16

## 2022-05-06 RX ORDER — HEPARIN 100 UNIT/ML
300-500 SYRINGE INTRAVENOUS AS NEEDED
Status: CANCELLED
Start: 2022-05-18

## 2022-05-06 RX ORDER — ONDANSETRON 2 MG/ML
8 INJECTION INTRAMUSCULAR; INTRAVENOUS AS NEEDED
Status: CANCELLED | OUTPATIENT
Start: 2022-05-16

## 2022-05-06 RX ORDER — ALBUTEROL SULFATE 0.83 MG/ML
2.5 SOLUTION RESPIRATORY (INHALATION) AS NEEDED
Status: CANCELLED
Start: 2022-05-16

## 2022-05-06 RX ORDER — DIPHENHYDRAMINE HYDROCHLORIDE 50 MG/ML
50 INJECTION, SOLUTION INTRAMUSCULAR; INTRAVENOUS AS NEEDED
Status: CANCELLED
Start: 2022-05-16

## 2022-05-06 RX ORDER — SODIUM CHLORIDE 9 MG/ML
10 INJECTION INTRAMUSCULAR; INTRAVENOUS; SUBCUTANEOUS AS NEEDED
Status: ACTIVE | OUTPATIENT
Start: 2022-05-06 | End: 2022-05-06

## 2022-05-06 RX ORDER — PROCHLORPERAZINE MALEATE 10 MG
10 TABLET ORAL
Qty: 30 TABLET | Refills: 2 | Status: SHIPPED | OUTPATIENT
Start: 2022-05-06

## 2022-05-06 RX ORDER — HYDROCORTISONE SODIUM SUCCINATE 100 MG/2ML
100 INJECTION, POWDER, FOR SOLUTION INTRAMUSCULAR; INTRAVENOUS AS NEEDED
Status: CANCELLED | OUTPATIENT
Start: 2022-05-16

## 2022-05-06 RX ORDER — DIPHENHYDRAMINE HYDROCHLORIDE 50 MG/ML
25 INJECTION, SOLUTION INTRAMUSCULAR; INTRAVENOUS AS NEEDED
Status: CANCELLED
Start: 2022-05-16

## 2022-05-06 RX ORDER — HEPARIN 100 UNIT/ML
500 SYRINGE INTRAVENOUS AS NEEDED
Status: ACTIVE | OUTPATIENT
Start: 2022-05-06 | End: 2022-05-06

## 2022-05-06 RX ORDER — PALONOSETRON 0.05 MG/ML
0.25 INJECTION, SOLUTION INTRAVENOUS ONCE
Status: CANCELLED | OUTPATIENT
Start: 2022-05-16 | End: 2022-05-16

## 2022-05-06 RX ORDER — HEPARIN 100 UNIT/ML
300-500 SYRINGE INTRAVENOUS AS NEEDED
Status: CANCELLED
Start: 2022-05-16

## 2022-05-06 RX ORDER — SODIUM CHLORIDE 0.9 % (FLUSH) 0.9 %
5-10 SYRINGE (ML) INJECTION AS NEEDED
Status: DISPENSED | OUTPATIENT
Start: 2022-05-06 | End: 2022-05-06

## 2022-05-06 RX ORDER — LIDOCAINE AND PRILOCAINE 25; 25 MG/G; MG/G
CREAM TOPICAL
Qty: 30 G | Refills: 2 | Status: SHIPPED | OUTPATIENT
Start: 2022-05-06

## 2022-05-06 RX ORDER — ATROPINE SULFATE 0.4 MG/ML
0.4 INJECTION, SOLUTION ENDOTRACHEAL; INTRAMEDULLARY; INTRAMUSCULAR; INTRAVENOUS; SUBCUTANEOUS
Status: CANCELLED | OUTPATIENT
Start: 2022-05-16

## 2022-05-06 RX ORDER — ACETAMINOPHEN 325 MG/1
650 TABLET ORAL AS NEEDED
Status: CANCELLED
Start: 2022-05-16

## 2022-05-06 RX ORDER — DEXTROSE MONOHYDRATE 50 MG/ML
25 INJECTION, SOLUTION INTRAVENOUS CONTINUOUS
Status: CANCELLED
Start: 2022-05-16

## 2022-05-06 RX ORDER — EPINEPHRINE 1 MG/ML
0.3 INJECTION, SOLUTION, CONCENTRATE INTRAVENOUS AS NEEDED
Status: CANCELLED | OUTPATIENT
Start: 2022-05-16

## 2022-05-06 RX ORDER — SODIUM CHLORIDE 9 MG/ML
10 INJECTION INTRAMUSCULAR; INTRAVENOUS; SUBCUTANEOUS AS NEEDED
Status: CANCELLED | OUTPATIENT
Start: 2022-05-16

## 2022-05-06 RX ORDER — SODIUM CHLORIDE 9 MG/ML
10 INJECTION INTRAMUSCULAR; INTRAVENOUS; SUBCUTANEOUS AS NEEDED
Status: CANCELLED | OUTPATIENT
Start: 2022-05-18

## 2022-05-06 RX ORDER — SODIUM CHLORIDE 0.9 % (FLUSH) 0.9 %
10 SYRINGE (ML) INJECTION AS NEEDED
Status: CANCELLED | OUTPATIENT
Start: 2022-05-18

## 2022-05-06 RX ORDER — ONDANSETRON HYDROCHLORIDE 8 MG/1
8 TABLET, FILM COATED ORAL
Qty: 30 TABLET | Refills: 2 | Status: SHIPPED | OUTPATIENT
Start: 2022-05-06

## 2022-05-06 RX ADMIN — ALTEPLASE 2 MG: 2.2 INJECTION, POWDER, LYOPHILIZED, FOR SOLUTION INTRAVENOUS at 11:23

## 2022-05-06 NOTE — TELEPHONE ENCOUNTER
[address] at [place]  [phone]        05/06/22 1:58 PM Blood specimen collected for [facility] testing. Requisition completed via portal and printed, order to be sent with specimen. Scheduled [employer] -- # [id number]. Will continue to monitor. 05/27/22 10:12 AM Per [email or web address] portal, [facility] testing still pending. Will continue to monitor. 06/03/22 9:23 AM [email or web address], [facility] pending. Message sent to representative to inquire about status. Will continue to monitor. 1:50 PM Received update from [facility]. They received tissue from [facility] on 05/20. Representative anticipates results to be back between 06/13 and 06/15. Will continue to monitor and update Dr. Misty Castro of above.     06/13/22 12:13 PM [facility] results have returned--copy already scanned to patient's chart.  Will also notify Yamel Morton NP. 64 yo male who presents after evaluation for non healing wound that has been present for 1 month despite debridement and current iv abx with picc line. the wound is persistent, without pain, no associated discharge and no aggravating or alleviating factors. intensity is mild.

## 2022-05-06 NOTE — PROGRESS NOTES
Outpatient [facility]fusion [facility] Short Visit Progress Note    Patient admitted to [facility] for port flush/lab draw,  ambulatory in stable condition. Assessment completed. No new concerns voiced. Vital Signs:  Visit Vitals  BP (!) 118/90   Pulse 91   Temp 97.5 °F (36.4 °C)   Resp 16   SpO2 99%       Port had no blood return. Cathflo instilled. After 90 minutes there was still no blood return. Labs drawn peripherally. Dr. Darryle Nevin office notified and port study will be ordered and patient will be contacted. Patient verbalized understanding. Medications:  Medications Administered     alteplase (CATHFLO) 2 mg in sterile water (preservative free) 2 mL injection     Admin Date  05/06/2022 Action  Given Dose  2 mg Route  Inter[facility] Administered By  [facility] [place]                 Patient tolerated treatment well. Patient discharged from [place] [address] ambulatory in no distress. Patient aware of next appointment.     Future Appointments   Date Time Provider Dahlia Epps   5/12/2022  9:00 AM Katiana Ellis [facility] BS AMB   5/16/2022  7:30 AM SS INF3 CH2 >7H [facility]   5/16/2022  8:15 AM Abran Blake NP [facility] BS AMB   5/16/2022 11:30 AM William Thompson MD [facility] BS AMB   5/18/2022  2:00 PM SS INF6 CH4 <1H [facility]   5/31/2022  7:30 AM SS INF3 CH2 >7H [facility]   6/2/2022  2:30 PM SS INF6 CH4 <1H [facility]   6/13/2022  7:30 AM SS INF1 CH2 >7H [facility]   6/15/2022  2:00 PM SS INF7 CH2 <1H [facility]   6/27/2022 10:00 AM Luis A Coon DPM [facility] BS AMB

## 2022-05-06 NOTE — ONCOLOGY PATHWAY NOTE
START ON PATHWAY REGIMEN - Colorectal    [id number]        Bevacizumab-xxxx       Irinotecan (Camptosar)       Oxaliplatin (Eloxatin)       Leucovorin       Fluorouracil     **Always confirm dose/schedule in your pharmacy ordering system**    Patient Characteristics:  Distant Metastases, Nonsurgical Candidate, KRAS/NRAS Wild-Type (BRAF V600 Wild-Type/Unknown), Standard Cytotoxic Therapy, First Line Standard Cytotoxic Therapy, Right-sided Tumor, PS = 0,1, Bevacizumab Eligible  Tumor Location: Colon  Therapeutic Status: Distant Metastases  Microsatellite/Mismatch Repair Status: SUZANNE/pMMR  BRAF Mutation Status: Wild-Type (no mutation)  KRAS/NRAS Mutation Status: Wild-Type (no mutation)  Preferred Therapy Approach: Standard Cytotoxic Therapy  Standard Cytotoxic Line of Therapy: First Line Standard Cytotoxic Therapy  ECOG Performance Status: 0  Primary Tumor Location: Right-sided Tumor  Bevacizumab Eligibility: Eligible  Intent of Therapy:  Non-Curative / Palliative Intent, Discussed with Patient

## 2022-05-10 NOTE — PROGRESS NOTES
[id number] [facility] On[facility] at [facility]  [phone]    Hematology / Oncology Established Visit    Reason for Visit:   Gretchen Sheth is a 42 y.o. male who is seen for urgent follow up of colon cancer, h/o lymphoma. Hematology Oncology Treatment History:     Diagnosis #1: Follicular lymphoma  Stage: IV  Pathology:   11/13/18 right inguinal LN excision: Follicular lymphoma, high-grade (grade 3a of 3). Prior Treatment:   1. Obinutuzumab-CHOP. Obinutuzumab: 1000 mg weekly on days 1, 8, 15 for cycle 1, then 1000 mg on day 1 q21 days for cycles 2-6, then monotherapy 1000 mg every 21 days for cycle 7, 8 with Cyclophosphamide 750mg/m2, Doxorubicin 50mg/m2, Vincristine 1.4mg/m2 on day 1 and Prednisone 100mg on Days 1-5, every 21 days for a total of 2 cycles completed late 1/2019. Regimen discontinued due to STEMI. 2. Obinutuzumab + Bendamustine: 1000 mg Obinutuzumab on day 1 + Bendamustine 90mg/m2 on days 1-2 on a 28-day cycle x 4 cycles, completed 5/2019  Current Treatment: Surveillance      Diagnosis #2: Colon cancer:  Stage: pending  Pathology:  Ascending colon; biopsy: poorly differentiated carcinoma  Comment: No dysplasia is identified. Immunohistochemical stains performed on block 1A, show the tumor positive for Cam5.2 and shows patchy positivity for CDX2 with a Ki-67 index of -90%; the tumor is focally positive for CK5/6 and GATA3; negative for p63, Pax8, CK7, CK20, panmelanoma, synaptophysin and chromogranin. The immunophenotypic features are nonspecific but argues somewhat against the following carcinoma: urothelial, neuroendocrine and breast. The immunophenotypic features also argues against melanoma and somewhat against classic colorectal carcinoma. Additional immunohistochemical stains to exclude the possibility of prostate and hepatocellular carcinoma have been   ordered; the results will be reported as an addendum.   MLH1/MSH2/MSH6/PMS2 all intact with no loss of nuclear expression of MMR proteins - no MSI   Addendum: The addendum is issued to report the results of additional immunohistochemical stains in an attempt to ascertain the primary site of the carcinoma. The diagnosis is unchanged. Hepar and glypican 3 immunohistochemical stains are neg, arguing against hepatocellular carcinoma. PSA stain is negative, arguing against prostatic primary. Imaging studies to eval for poss primary sites maybe useful. In the absence of carcinoma/tumor at any other sites, this may represent an undifferentiated carcinoma of the colon. Oncogenomics (molecular) studies: POLE< ARID1A, YVONNE, ATR, BRCA2, CHECK1, FANCI, NF1, PIK3CA, PTCH1, PTEN, RAD50, RAD51, RB1, SMARCA4, STK11    Prior Treatment: Loop ileostomy 2/19/22  Current Treatment: planned for R hemicolectomy, ileostomy reversal 4/27/22       Oncologic History:  Was in his usual state of health in early November 2018 when he developed low back pain and presented to the ER. Work-up at outside hospital revealed a retroperitoneal mass seen on CT imaging, and he was transferred to [facility] for further work-up. CT there showed a large retroperitoneal mass encircling the aorta with invasion of the left renal hilum and left adrenal gland. There were bilateral inguinal lymph nodes and moderate left hydronephrosis. He was evaluated while at [facility] and was noted to have palpable nodes in his groin for approximately the past 1 month. He underwent excisional LN biopsy of right inguinal LN, which revealed follicular lymphoma. At time of diagnosis, he had no cardiac disease aside from HTN, hyperlipidemia. However, he did have an NSTEMI after cycle 2 of O-CHOP. Was likely unrelated to chemotherapy, but opted to switch treatment to Obinutuzumab-Bendamustine. He completed treatment in 5/2019 and had a CR based on PET. History of Present Illness:   Mr. Jeovany Paz is a 42 y.o. male with prior h/o follicular lymphoma is seen for follow up of colon cancer. Reports RLQ pain managed with oxycodone every 6 hours. Reports poor appetite - eating once per day. Hydrating with poweraide and one bottle water per day. Reports soft stool in colostomy. No dizziness, CP, SOB, nausea or vomiting. PMHx: Lymphoma in 2018, CAD, HTN, Hyperlipidemia, Anxiety, chronic low back pain  PSurgHx: None aside from cardiac stent placement and port placement  SHx: Smokes 1/2ppd x past 20 yrs. Works part time as a cook. Has 2 children. FHx: Father had leukemia, passed away from pancreatic cancer. Review of Systems: A complete review of systems was obtained, negative except as described above. Physical Exam:     Visit Vitals  /81   Pulse 82   Temp 98.1 °F (36.7 °C)   Resp 16   Ht 5' 7\" (1.702 m)   Wt 132 lb (59.9 kg)   SpO2 100%   BMI 20.67 kg/m²       ECOG PS: 0  General: no distress  Eyes: anicteric sclerae  HENT: oropharynx clear  Neck: supple  Lymphatic: no cervical, supraclavicular adenopathy  Respiratory: normal respiratory effort  CV: no peripheral edema, port upper chest   GI: soft, nontender, nondistended, no masses;  colostomy in place. Skin: no rashes; no ecchymoses; no petechiae      Results:     Lab Results   Component Value Date/Time    WBC 11.5 (H) 05/16/2022 08:16 AM    HGB 11.6 (L) 05/16/2022 08:16 AM    HCT 37.5 05/16/2022 08:16 AM    PLATELET 200 (H) 68/30/4246 08:16 AM    MCV 80.0 05/16/2022 08:16 AM    ABS.  NEUTROPHILS 7.5 05/16/2022 08:16 AM    Hemoglobin (POC) 15.0 06/05/2009 02:13 PM    Hematocrit (POC) 39 02/14/2019 01:24 PM     Lab Results   Component Value Date/Time    Sodium 134 (L) 05/16/2022 08:16 AM    Potassium 3.7 05/16/2022 08:16 AM    Chloride 103 05/16/2022 08:16 AM    CO2 23 05/16/2022 08:16 AM    Glucose 114 (H) 05/16/2022 08:16 AM    BUN 18 05/16/2022 08:16 AM    Creatinine 1.35 (H) 05/16/2022 08:16 AM    GFR est AA >60 05/16/2022 08:16 AM    GFR est non-AA 57 (L) 05/16/2022 08:16 AM    Calcium 9.8 05/16/2022 08:16 AM    Sodium (POC) 136 2019 01:24 PM    Potassium (POC) 3.9 2019 01:24 PM    Chloride (POC) 102 2019 01:24 PM    Glucose (POC) 249 (H) 02/15/2019 10:21 PM    BUN (POC) 14 2019 01:24 PM    Creatinine (POC) 0.9 2019 01:24 PM    Calcium, ionized (POC) 1.24 2019 01:24 PM     Lab Results   Component Value Date/Time    Bilirubin, total 0.2 2022 08:16 AM    ALT (SGPT) 15 2022 08:16 AM    Alk. phosphatase 136 (H) 2022 08:16 AM    Protein, total 7.8 2022 08:16 AM    Albumin 3.0 (L) 2022 08:16 AM    Globulin 4.8 (H) 2022 08:16 AM     Lab Results   Component Value Date/Time    Iron 18 (L) 2022 11:13 AM    TIBC 173 (L) 2022 11:13 AM    Iron % saturation 10 (L) 2022 11:13 AM    Ferritin 426 (H) 2022 11:13 AM       No results found for: B12LT, FOL, RBCF  Lab Results   Component Value Date/Time    TSH 1.53 2016 04:40 AM       Lab Results   Component Value Date/Time    Hepatitis A, IgM NONREACTIVE 2018 04:53 PM    Hepatitis B surface Ag <0.10 2018 04:53 PM    Hepatitis B core, IgM NONREACTIVE 2018 04:53 PM       18:       Labs at [facility]:  22: CA 19-9 = 390  22: CEA = 12.3    Imagin/9/18 Abd/pelvis CT: IMPRESSION:  1. Interval development of a large retroperitoneal mass encircling the aorta with invasion of the left renal hilum and left adrenal gland. Several adjacent lymph nodes are seen extending into the peritoneum and underneath the  diaphragmatic natalie. This most likely represents lymphoma. 2. Several new bilateral enlarged inguinal lymph nodes also likely representing lymphoma. 3. Moderate left hydronephrosis with a delayed renal nephrogram related to decreased renal function. This is related to the invasion of the renal hilum. 18 Chest CT: IMPRESSION:  Trace left pleural effusion. Bilateral lower lobe atelectasis. Large  retroperitoneal mass lesion again demonstrated.     PET 12/18/18:  FINDINGS:  HEAD/NECK: Right palatine tonsil intense hypermetabolism, max SUV 18. Multilevel  bilateral cervical adenopathy, with max SUV 12 in a left supraclavicular node. Cerebral evaluation is limited by normal intense activity. CHEST: Solitary hypermetabolic left axillary node, max SUV 11. ABDOMEN/PELVIS: Bulky retroperitoneal mass max SUV 27, with several additional  small active abdomino-pelvic nodes. Bilateral inguinal nodes with max SUV 12 on  the left. SKELETON: No foci of abnormal hypermetabolism in the axial and visualized  appendicular skeleton. IMPRESSION:   1. Right palatine tonsil tumor involvement (Deauville 5). 2. Bilateral cervical delaney involvement (Deauville 5). 3. Left axillary node involvement (Deauville 5). 4. Bulky retroperitoneal lymphoma mass and additional smaller hypermetabolic  abdomino-pelvic nodes (Deauville 5). 5. Bilateral inguinal delaney involvement (Deauville 5). Deauville Five Point Scale  1. No uptake or no residual uptake (when used interim)  2. Slight uptake, but below blood pool (mediastinum)  3. Uptake above mediastinal, but below/equal to uptake in the liver  4. Uptake slightly to moderately higher than liver  5. Markedly increased uptake or any new lesion (on response evaluation)  Each FDG-avid (or previously FDG avid) lesion is rated independently. Reference values:  Mediastinal blood pool: 2.1 SUV  Liver (background): 2.2 SUV    PET/CT 2/05/19:   IMPRESSION:  1. No Foci of Abnormal Hypermetabolism (Deauville 1). 2. Resolved activity in the right palatine tonsil, bilateral cervical nodes,left axillary node, retroperitoneal/abdominal pelvic adenopathy, bilateral inguinal nodes. Echo 2/14/19:  Normal cavity size, wall thickness and systolic function (ejection fraction normal). The muscle mass is normal. The cavity shape is normal. The estimated ejection fraction is 41 - 45%. Abnormal wall motion as described on the wall scoring diagram below. End-systolic volume is normal. Normal left ventricular strain. There is mild (grade 1) left ventricular diastolic dysfunction. Normal left ventricular diastolic pressure. End-diastolic volume is normal.    LE arterial duplex 2/22/19:  There is evidence of left groin pseudoaneurysm noted arising from distal common femoral artery, pseudo lobe measures 2.32cm x 2.58cm and pseudo neck length measuring 0.63cm. There is no evidence of hemodynamically significant left lower extremity arterial obstruction. JACLYN is 1.03 on the right and 1.02 on the left. LE arterial duplex s/p Thrombin Injection to Pseudoaneurysm 2/26/19:  Successful thrombin injection procedure of the left groin with no further flow seen. No evidence of hemodnyamically significant obstruction in the left lower extremity. Left lower extremity arterial duplex performed. Confirmed pseudoaneurysm in left groin with small neck. Following thrombin injection, no further flow seen in the pseudoaneurysm. The left common femoral, profunda femoral, femoral, popliteal, posterior tibial and anterior arteries were imaged. Mainly triphasic flow was seen with no evidence of significantly elevated velocities. Repeat LE arterial duplex 2/27/19:  Continued thrombosed left groin pseudoaneurysm following thrombin injection on 02/26/2019. No flow or color fill is identified. The hematoma measures approximately 2.1 x 2.9 cm in diameter. The common femoral, deep femoral, femoral, and popliteal arteries are patent with mainly tri-phasic flow and no significant hemodynamically obstruction is noted. Stress 5/31/19:  · Normal stress myocardial perfusion without ischemia or infact at 84% MPHR. Normal LV function. LVEF 60%. · No EKG changes of ischemia at peak exercise. · Normal functional capacity. PET 6/03/19: IMPRESSION: No Foci of Abnormal Hypermetabolism (Deauville 1). -MRI lumbar spine 12/18/19:  Mild disc degenerative change at L3-4 and L4-5.   Mild canal stenosis at L3-4 and mild left foraminal stenosis at L4-5. Other less severe degenerative findings are as described above. Continued diminished size of retroperitoneal mass-adenopathy,  with diminished soft tissue density at the left renal hilum. -CT chest/abdomen 12/16/19:  Findings are consistent with interval response to therapy    CT c/a/p 5/28/20: IMPRESSION:  Further contraction of the retroperitoneal mantle and chris mesentery,  compatible with treatment response  Stable left hilar soft tissue mass  Slight increased splaying of the duodenum and proximal jejunum is the result, without obstruction  No evidence for recurrence of lymphoma in the chest, abdomen, or pelvis    CT c/a/p 2/13/22:  FINDINGS:   LOWER THORAX: Dependent atelectasis with otherwise clear lungs. The visualized  heart is normal in size without pericardial effusion. LIVER: No mass. BILIARY TREE: Gallbladder is unremarkable. CBD is not dilated. SPLEEN: Unremarkable. PANCREAS: No mass or ductal dilatation. ADRENALS: Unremarkable. KIDNEYS: Atrophic left kidney with mild left hydronephrosis. Right kidney is  unremarkable with no stone, enhancing mass, or other renal abnormality. STOMACH: Unremarkable. SMALL BOWEL: No dilatation or wall thickening. COLON: Circumferential wall thickening at the hepatic flexure with luminal  narrowing, adjacent mesenteric stranding, and fluid with upstream retention in  the cecum and fecalization of distal ileal contents. No dilation or other wall  thickening. APPENDIX: Unremarkable. PERITONEUM: Moderate free fluid with no pneumoperitoneum. RETROPERITONEUM: Soft tissue stranding around the celiac and SMA and associated aorta with no discrete mass or adenopathy. Aorta is normal in size without aneurysm or dissection. REPRODUCTIVE ORGANS: Prostate and seminal vesicles appear unremarkable. URINARY BLADDER: No mass or calculus. BONES: No destructive bone lesion.   ABDOMINAL WALL: No mass or hernia. ADDITIONAL COMMENTS: N/A  IMPRESSION  1. Annular mass lesion of the right colon with upstream fecal retention  concerning for primary colon neoplasm versus less likely lymphoma. 2. Soft tissue stranding around celiac axis, SMA, and abdominal aorta may  reflect infiltrative lymphoma. 3. Left renal atrophy with left hydronephrosis likely reflecting chronic  proximal ureteral obstruction. 4. Incidentals as above. 2/24/22 CT abd/pelvis at [facility]:  FINDINGS: An enteric tube with sidehole beyond the level of the lower esophageal sphincter is seen looping on itself in the proximal stomach before terminating in the mid stomach. Status post right lower quadrant diverting ileostomy for an obstructing colonic mass. Multiple loops of gas dilated small bowel remain, with a maximal diameter of 5.4 cm whereas previously measured 3.6 cm. Dilute contrast is seen within the lateral left abdominal dilated small bowel. Moderate stool burden and bowel gas opacifies the cecum, measuring 7.3 cm in diameter.    No definite supine evidence of pneumoperitoneum. Lung bases: Limited evaluation of the lung bases demonstrates a cardiac silhouette within normal limits for size. A small bore central venous catheter is seen terminating in the right atrium. No focal consolidation or pleural effusion. 2/24/22 CT abd/pelvis [facility]:  IMPRESSION:  1. Status post right lower quadrant loop ileostomy. Mild diffuse dilation of small bowel proximal to the ostomy in the lower abdomen and upper pelvis concerning for at least mild to moderate partial bowel obstruction. Relatively decompressed loop ileostomy. 2. Mildly complex pelvic fluid collection in the rectovesical space, decreased in size from prior. 3. Redemonstrated ascending colon mass and findings suspicious for regional metastatic disease. 4. Redemonstrated soft tissue in the retroperitoneum with prominent lymph nodes, similar to prior examination.  Differential would include metastasis, retroperitoneal fibrosis. 5. Mild atherosclerosis. 6. Subcentimeter right renal hypodensity, too small to characterize. 7. Severe compression of the left renal vein, similar to prior examination. 8. Additional findings as described above. Bones: No acute osseous abnormality. 2/24/22 CT chest [facility]:  IMPRESSION:  FINAL report. 1. No evidence of metastatic disease to the chest.  2. No enlarged lymph nodes. 3. Increasing volume loss in the lung bases which may be due to atelectasis. 4. CT abdomen and pelvis reported separately.     2/25/22 Colonoscopy at [facility]:  Impression:            - Preparation of the colon was inadequate.                        - Stool in the entire examined colon.                        - Likely malignant completely obstructing tumor at the                         hepatic flexure. Biopsied.                        - Malignant-appearing tumor in the colon. Complications:         No immediate complications. Assessment & Plan:   Oscar Dominique is a 42 y.o. male comes in for evaluation and management of lymphoma. 1. Undifferentiated carcinoma of transverse colon, KRAS/NRAS wild type:  S/p diverting ileostomy due to large bowel obstruction. Colonoscopy with biopsy on 2/25/22. No obvious metastatic disease on CT imaging, but did have obvious metastatic disease to peritoneum during laparoscopy with Dr. Jennifer Alvarenga. I recommend   -- Signatera testing done - results pending  -- Check CEA on date of first chemo  -- Discussed FOLFOXIRI every 2 weeks. Will not give Bevacizumab given h/o MRI and tobacco use. -- Consider repeat colonoscopy in 6 months given incomplete colonoscopy. -- Proceed with C1 of FOLFOXIRI with pump removal on Wednesday. -- Follow up in 2 weeks C2 FOLFOXIRI, MD/NP visit. 2. H/o Follicular lymphoma: Grade 3a with with bulky disease encircling the aorta, causing pain. Bone marrow negative for lymphoma, but was hypercellular.  BR better than RCHOP, but based on GALLIUM study, Obinutuzumab-based induction and maintenance prolongs PFS over that seen with rituximab-based therapy. Therefore, pt started on O-CHOP regimen. He received 2 cycles with CR and was then switched to BR x 4 cycles given STEMI. Completed treatment 5/2019. PET completed 6/3/19 showed CR. CT 5/28/20 negative for disease. No extra surveillance needed at this time given metastatic colon cancer with frequent imaging. 3. Chronic lumbar back pain/anxiety/RLQ: Left lower back pain is chronic/stable and RLQ is likely related to neoplasm/colostomy - currently managing pain on Oxycodone and Lexapro daily. Signed pain contract on 12/28/18 and following with Dr. Giancarlo Alva. 4. CAD: h/o STEMI 2/14/29 with TOM placed to proximal LAD. Following with Dr. Willa Foy and remains on dual antiplatelet therapy, high dose Lipitor, Metoprolol, ACEI. Received only 2 doses of cardiotoxic chemo, Doxorubicin in early 1/2019. Is overdue for follow up with Dr. Willa Foy. 5. Tobacco abuse: Discussed benefits of quitting smoking again. Previously discussed replacing hand-to-mouth habit with another item such as Twizzlers or SlimJims. 6. HTN: Well controlled on lisinopril. Managed by PCP. 7. BRCA2 mutation:  Will discuss with patient in future. He would benefit from genetic counseling for himself and his family. 8. Weight-loss / ALMA:  Encouraged supplementing with 2-3 boost/ensures daily in addition to meals and 60 oz water daily. Mild ALMA likely due to hypovolemia. Advised increase PO intake of fluids. Goals of care: Disease is not curable, but is treatable to improve quality and duration of life. I personally saw and evaluated the patient and performed the key components of medical decision making. The history, physical exam, and documentation were performed by Val Alvarado NP. I reviewed and verified the above documentation and modified it as needed.        Signed By: Fayne Aase, MD     May 16, 2022

## 2022-05-11 ENCOUNTER — TELEPHONE (OUTPATIENT)
Dept: ONCOLOGY | Age: 47
End: 2022-05-11

## 2022-05-11 NOTE — TELEPHONE ENCOUNTER
[address] at [place]  [phone]        05/11/22 10:48 AM Attempted to call patient via home/cell phone number listed. No answer, left message requesting return call. Provided office phone number as well. Per chart review, does not appear port study has been scheduled yet. Would like to provide patient with contact number for  department so that he may proceed with scheduling this prior to return appointment on 05/16. Will continue to monitor. 2:44 PM Received return call from patient. Discussed above and provided him with contact number for  department. No further questions or concerns at this time.

## 2022-05-12 ENCOUNTER — TELEPHONE (OUTPATIENT)
Dept: PALLATIVE CARE | Age: 47
End: 2022-05-12

## 2022-05-12 ENCOUNTER — TELEPHONE (OUTPATIENT)
Dept: ONCOLOGY | Age: 47
End: 2022-05-12

## 2022-05-12 DIAGNOSIS — C18.9 COLON ADENOCARCINOMA (HCC): ICD-10-CM

## 2022-05-12 DIAGNOSIS — R10.84 ABDOMINAL PAIN, GENERALIZED: Primary | ICD-10-CM

## 2022-05-12 DIAGNOSIS — C18.3 MALIGNANT NEOPLASM OF HEPATIC FLEXURE (HCC): ICD-10-CM

## 2022-05-12 DIAGNOSIS — K63.89 HEPATIC FLEXURE MASS: ICD-10-CM

## 2022-05-12 RX ORDER — OXYCODONE HYDROCHLORIDE 10 MG/1
10 TABLET ORAL
Qty: 90 TABLET | Refills: 0 | Status: SHIPPED | OUTPATIENT
Start: 2022-05-13 | End: 2022-05-13

## 2022-05-12 NOTE — TELEPHONE ENCOUNTER
Curtis Vinson called and requested a call back to discuss patient and his job while receiving chemo.  Please advise       # [phone]

## 2022-05-12 NOTE — TELEPHONE ENCOUNTER
[employer]  Oncology Social Work  Encounter     Patient Name:  Qiana Hernandez Sr.    Medical History: colon cancer    Advance Directives: on file    Narrative: Return call placed to Adama Schuler. Patient was present on speaker phone and give verbal permission to speak with Adama Fairly. They have the following questions:  1. Is patient able to work during treatment? 2. Is patient eligible for Social Security Disability? Explained per Dr. Shayan Leonardo although she has not told the patient he can not work his treatment regime is very hard, his immune sytem will be down and it will be very difficult for him to continue to work. Advised he would like be eligible for social security disability given diagnosis and inability to work. Provided verbal and written ([email or web address]) information to patient about Social Security Disability application process. Patient partner is able to assist with application process. They are aware of the 5 month waiting period for SSDI. Noted patient has Medicaid insurance. Offered to leave copy of medical records for patient to  during next appointment that will be helpful when completing the application. They voiced understanding and appreciation. Barriers to Care: financial    Plan:  1. Applying for social security disability. Referral/Handouts:    Insurance/Entitlements referral    Thank you,   Hansel Garcia LCSW

## 2022-05-12 NOTE — TELEPHONE ENCOUNTER
Palliative Medicine  Nursing Note  [phone]  Fax [phone]      Telephone Call  Patient Name: Ramon Thompson. YOB: [birth year]        Primary Decision Maker: Lore Aguilar - Girlfriend - [phone]   Advance Care Planning 4/20/2022   Patient's Healthcare Decision Maker is: -   Confirm Advance Directive None   Patient Would Like to Complete Advance Directive Yes   Does the patient have other document types (No Data)     Call returned to Mr. Sonia Manning and left voice mail message to return call regarding his increased pain and that his Oxycodone 10mg can be refilled for tomorrow. Message left from significant other that he has metastasis to stomach lining and has increased pain and was asking for increased dose.        Triage for Controlled Substance Refill Request    Pain Diagnosis: hepatic flexure cancer    Last Outpatient Visit: 4/18/22    Next Outpatient Visit: 5/16/22    Reason for refill needed outside of office visit- Appointment not scheduled prior to need for scheduled refill,     Any Reported Side effects: none    Last dose taken: enough for tomorrow    Pharmacy: [facility]    Medication: Oxycodone   Dose and directions: 10mg 1 every 4 prn  Number dispensed: 90  Date filled ( or Pharmacy): 4/29/22  # left: enough for tomorrow       reviewed: 5/12/22    Date of Urine Drug Screen:      Opioid Safety Handout given:  yes    Appropriate for refill:  yes    Action:  pended for Dr. Jaison Colbert, RN  Palliative Medicine

## 2022-05-12 NOTE — TELEPHONE ENCOUNTER
The patient's girlfriend called to advise the cancer has spread to the lining in his stomach. He is in a lot of pain and they are requesting something stronger to be called in before tomorrow when his pain medication refill is due.  # [phone]

## 2022-05-13 ENCOUNTER — TELEPHONE (OUTPATIENT)
Dept: PALLATIVE CARE | Age: 47
End: 2022-05-13

## 2022-05-13 DIAGNOSIS — R10.84 ABDOMINAL PAIN, GENERALIZED: ICD-10-CM

## 2022-05-13 DIAGNOSIS — C18.3 MALIGNANT NEOPLASM OF HEPATIC FLEXURE (HCC): ICD-10-CM

## 2022-05-13 DIAGNOSIS — C18.9 COLON ADENOCARCINOMA (HCC): ICD-10-CM

## 2022-05-13 RX ORDER — OXYCODONE HYDROCHLORIDE 10 MG/1
10 TABLET ORAL
Qty: 90 TABLET | Refills: 0 | Status: SHIPPED | OUTPATIENT
Start: 2022-05-13 | End: 2022-05-27 | Stop reason: SDUPTHER

## 2022-05-13 NOTE — TELEPHONE ENCOUNTER
Palliative Medicine  Nursing Note  [phone]  Fax [phone]      Telephone Call  Patient Name: Alli Gonzales. YOB: [birth year]        Primary Decision Maker: Thomas Bass - Girlfriend - [phone]   Advance Care Planning 4/20/2022   Patient's Healthcare Decision Maker is: -   Confirm Advance Directive None   Patient Would Like to Complete Advance Directive Yes   Does the patient have other document types (No Data)     Incoming call from Mr. Cadence Fink who shared that [facility] on [address] [place] [address] is having difficulty processing theOxycodone 10mg prescription. Call placed to [facility] on [facility] and requested to cancel the prescription. They shared that it was showing that it was requiring a prior auth. Call placed to [facility] who stated it is approved and was designated as being filled by [facility] on [facility]. Call placed to [facility] on [address] [place] [address] who verified that they were able to process it and it is ready for patient to . Call placed to patient and informed of the above.  He was appreciative    Darlyn Lopez, RN  Palliative Medicine

## 2022-05-13 NOTE — TELEPHONE ENCOUNTER
The patient called back to request Oxycodone 10 mg be called in to [facility] on [address] . His usual pharmacy does not have enough to fill it.

## 2022-05-13 NOTE — TELEPHONE ENCOUNTER
Palliative Medicine  Nursing Note  [phone]  Fax [phone]      Telephone Call  Patient Name: Bhumika Childs. YOB: [birth year]        Primary Decision Maker: Felicitas Santos - Girlfriend - [phone]   Advance Care Planning 4/20/2022   Patient's Healthcare Decision Maker is: -   Confirm Advance Directive None   Patient Would Like to Complete Advance Directive Yes   Does the patient have other document types (No Data)       Call returned to Mr. Inderjit Freedman who shared that the [facility] on [facility] has enough Oxy 10mg to fill his prescription of #90. He is asking to have his prescription sent to this pharmacy. He shared that his pain is in his lower abdomen and is constant. He takes the Oxycodone every 4 hours throughout the day to maintain his pain level at around a 5. He is able to sleep through the night, about 8 hour, but wakes with 10/10 pain. He states that the medication does bring it down to a 5-6/10 which is \"ok\" as long as he takes the medication every 4 hours. Discussed that Dr. Karolina Hernandez will be made aware of the above. He does have a follow up appt on Monday 5/16/22. Call placed to [facility] and verified that they do have enough of the medication. Medication pended for Dr. Elly Saenz to send to [facility] on [facility].     Cinthia Crowe, RN  Palliative Medicine

## 2022-05-13 NOTE — TELEPHONE ENCOUNTER
The patient called and said that their pharmacy was not able to fill the full oxycodone prescription.   # [phone]

## 2022-05-16 ENCOUNTER — HOSPITAL ENCOUNTER (OUTPATIENT)
Dept: INFUSION THERAPY | Age: 47
Discharge: HOME | End: 2022-05-16
Payer: COMMERCIAL

## 2022-05-16 ENCOUNTER — OFFICE VISIT (OUTPATIENT)
Dept: ONCOLOGY | Age: 47
End: 2022-05-16

## 2022-05-16 VITALS
SYSTOLIC BLOOD PRESSURE: 128 MMHG | TEMPERATURE: 98.1 F | DIASTOLIC BLOOD PRESSURE: 81 MMHG | RESPIRATION RATE: 16 BRPM | HEIGHT: 67 IN | HEART RATE: 69 BPM | WEIGHT: 132.4 LBS | BODY MASS INDEX: 20.78 KG/M2 | OXYGEN SATURATION: 100 %

## 2022-05-16 VITALS
HEIGHT: 67 IN | WEIGHT: 132 LBS | RESPIRATION RATE: 16 BRPM | SYSTOLIC BLOOD PRESSURE: 108 MMHG | HEART RATE: 82 BPM | BODY MASS INDEX: 20.72 KG/M2 | OXYGEN SATURATION: 100 % | DIASTOLIC BLOOD PRESSURE: 81 MMHG | TEMPERATURE: 98.1 F

## 2022-05-16 DIAGNOSIS — C18.4 CARCINOMA OF TRANSVERSE COLON (HCC): Primary | ICD-10-CM

## 2022-05-16 DIAGNOSIS — D64.9 NORMOCYTIC ANEMIA: ICD-10-CM

## 2022-05-16 DIAGNOSIS — C18.9 COLON ADENOCARCINOMA (HCC): ICD-10-CM

## 2022-05-16 DIAGNOSIS — R63.4 WEIGHT LOSS: ICD-10-CM

## 2022-05-16 DIAGNOSIS — K52.1 DIARRHEA DUE TO DRUG: ICD-10-CM

## 2022-05-16 DIAGNOSIS — Z51.11 CHEMOTHERAPY MANAGEMENT, ENCOUNTER FOR: ICD-10-CM

## 2022-05-16 DIAGNOSIS — R10.31 RLQ ABDOMINAL PAIN: ICD-10-CM

## 2022-05-16 DIAGNOSIS — D50.0 IRON DEFICIENCY ANEMIA DUE TO CHRONIC BLOOD LOSS: Primary | ICD-10-CM

## 2022-05-16 DIAGNOSIS — C82.90 FOLLICULAR LYMPHOMA, UNSPECIFIED FOLLICULAR LYMPHOMA TYPE, UNSPECIFIED BODY REGION (HCC): ICD-10-CM

## 2022-05-16 LAB
ALBUMIN SERPL-MCNC: 3 G/DL (ref 3.5–5)
ALBUMIN/GLOB SERPL: 0.6 {RATIO} (ref 1.1–2.2)
ALP SERPL-CCNC: 136 U/L (ref 45–117)
ALT SERPL-CCNC: 15 U/L (ref 12–78)
ANION GAP SERPL CALC-SCNC: 8 MMOL/L (ref 5–15)
AST SERPL-CCNC: 13 U/L (ref 15–37)
BASOPHILS # BLD: 0.1 K/UL (ref 0–0.1)
BASOPHILS NFR BLD: 1 % (ref 0–1)
BILIRUB SERPL-MCNC: 0.2 MG/DL (ref 0.2–1)
BUN SERPL-MCNC: 18 MG/DL (ref 6–20)
BUN/CREAT SERPL: 13 (ref 12–20)
CALCIUM SERPL-MCNC: 9.8 MG/DL (ref 8.5–10.1)
CEA SERPL-MCNC: 19.2 NG/ML
CHLORIDE SERPL-SCNC: 103 MMOL/L (ref 97–108)
CO2 SERPL-SCNC: 23 MMOL/L (ref 21–32)
CREAT SERPL-MCNC: 1.35 MG/DL (ref 0.7–1.3)
DIFFERENTIAL METHOD BLD: ABNORMAL
EOSINOPHIL # BLD: 0.4 K/UL (ref 0–0.4)
EOSINOPHIL NFR BLD: 4 % (ref 0–7)
ERYTHROCYTE [DISTWIDTH] IN BLOOD BY AUTOMATED COUNT: 17.3 % (ref 11.5–14.5)
GLOBULIN SER CALC-MCNC: 4.8 G/DL (ref 2–4)
GLUCOSE SERPL-MCNC: 114 MG/DL (ref 65–100)
HBV SURFACE AB SER QL: NONREACTIVE
HBV SURFACE AB SER-ACNC: <3.1 MIU/ML
HBV SURFACE AG SER QL: <0.1 INDEX
HBV SURFACE AG SER QL: NEGATIVE
HCT VFR BLD AUTO: 37.5 % (ref 36.6–50.3)
HGB BLD-MCNC: 11.6 G/DL (ref 12.1–17)
IMM GRANULOCYTES # BLD AUTO: 0 K/UL (ref 0–0.04)
IMM GRANULOCYTES NFR BLD AUTO: 0 % (ref 0–0.5)
LYMPHOCYTES # BLD: 2.5 K/UL (ref 0.8–3.5)
LYMPHOCYTES NFR BLD: 22 % (ref 12–49)
MCH RBC QN AUTO: 24.7 PG (ref 26–34)
MCHC RBC AUTO-ENTMCNC: 30.9 G/DL (ref 30–36.5)
MCV RBC AUTO: 80 FL (ref 80–99)
MONOCYTES # BLD: 0.9 K/UL (ref 0–1)
MONOCYTES NFR BLD: 8 % (ref 5–13)
NEUTS SEG # BLD: 7.5 K/UL (ref 1.8–8)
NEUTS SEG NFR BLD: 65 % (ref 32–75)
NRBC # BLD: 0 K/UL (ref 0–0.01)
NRBC BLD-RTO: 0 PER 100 WBC
PLATELET # BLD AUTO: 551 K/UL (ref 150–400)
PMV BLD AUTO: 10.4 FL (ref 8.9–12.9)
POTASSIUM SERPL-SCNC: 3.7 MMOL/L (ref 3.5–5.1)
PROT SERPL-MCNC: 7.8 G/DL (ref 6.4–8.2)
RBC # BLD AUTO: 4.69 M/UL (ref 4.1–5.7)
SODIUM SERPL-SCNC: 134 MMOL/L (ref 136–145)
WBC # BLD AUTO: 11.5 K/UL (ref 4.1–11.1)

## 2022-05-16 PROCEDURE — 82378 CARCINOEMBRYONIC ANTIGEN: CPT

## 2022-05-16 PROCEDURE — 86704 HEP B CORE ANTIBODY TOTAL: CPT

## 2022-05-16 PROCEDURE — 96366 THER/PROPH/DIAG IV INF ADDON: CPT

## 2022-05-16 PROCEDURE — 96416 CHEMO PROLONG INFUSE W/PUMP: CPT

## 2022-05-16 PROCEDURE — 99215 OFFICE O/P EST HI 40 MIN: CPT | Performed by: INTERNAL MEDICINE

## 2022-05-16 PROCEDURE — 74011000258 HC RX REV CODE- 258: Performed by: INTERNAL MEDICINE

## 2022-05-16 PROCEDURE — 96368 THER/DIAG CONCURRENT INF: CPT

## 2022-05-16 PROCEDURE — 96375 TX/PRO/DX INJ NEW DRUG ADDON: CPT

## 2022-05-16 PROCEDURE — 85025 COMPLETE CBC W/AUTO DIFF WBC: CPT

## 2022-05-16 PROCEDURE — 96361 HYDRATE IV INFUSION ADD-ON: CPT

## 2022-05-16 PROCEDURE — 96415 CHEMO IV INFUSION ADDL HR: CPT

## 2022-05-16 PROCEDURE — 77030012965 HC NDL HUBR BBMI -A

## 2022-05-16 PROCEDURE — 96417 CHEMO IV INFUS EACH ADDL SEQ: CPT

## 2022-05-16 PROCEDURE — 86706 HEP B SURFACE ANTIBODY: CPT

## 2022-05-16 PROCEDURE — 36415 COLL VENOUS BLD VENIPUNCTURE: CPT

## 2022-05-16 PROCEDURE — 87340 HEPATITIS B SURFACE AG IA: CPT

## 2022-05-16 PROCEDURE — 80053 COMPREHEN METABOLIC PANEL: CPT

## 2022-05-16 PROCEDURE — 96413 CHEMO IV INFUSION 1 HR: CPT

## 2022-05-16 PROCEDURE — 74011250636 HC RX REV CODE- 250/636: Performed by: INTERNAL MEDICINE

## 2022-05-16 PROCEDURE — 74011250636 HC RX REV CODE- 250/636: Performed by: NURSE PRACTITIONER

## 2022-05-16 RX ORDER — HEPARIN 100 UNIT/ML
300-500 SYRINGE INTRAVENOUS AS NEEDED
Status: ACTIVE | OUTPATIENT
Start: 2022-05-16 | End: 2022-05-16

## 2022-05-16 RX ORDER — DIPHENHYDRAMINE HYDROCHLORIDE 50 MG/ML
25 INJECTION, SOLUTION INTRAMUSCULAR; INTRAVENOUS AS NEEDED
Status: ACTIVE | OUTPATIENT
Start: 2022-05-16 | End: 2022-05-16

## 2022-05-16 RX ORDER — EPINEPHRINE 1 MG/ML
0.3 INJECTION, SOLUTION, CONCENTRATE INTRAVENOUS AS NEEDED
Status: ACTIVE | OUTPATIENT
Start: 2022-05-16 | End: 2022-05-16

## 2022-05-16 RX ORDER — ACETAMINOPHEN 325 MG/1
650 TABLET ORAL AS NEEDED
Status: ACTIVE | OUTPATIENT
Start: 2022-05-16 | End: 2022-05-16

## 2022-05-16 RX ORDER — ONDANSETRON 2 MG/ML
8 INJECTION INTRAMUSCULAR; INTRAVENOUS AS NEEDED
Status: ACTIVE | OUTPATIENT
Start: 2022-05-16 | End: 2022-05-16

## 2022-05-16 RX ORDER — PROCHLORPERAZINE EDISYLATE 5 MG/ML
10 INJECTION INTRAMUSCULAR; INTRAVENOUS ONCE
Status: COMPLETED | OUTPATIENT
Start: 2022-05-16 | End: 2022-05-16

## 2022-05-16 RX ORDER — SODIUM CHLORIDE 9 MG/ML
10 INJECTION INTRAMUSCULAR; INTRAVENOUS; SUBCUTANEOUS AS NEEDED
Status: ACTIVE | OUTPATIENT
Start: 2022-05-16 | End: 2022-05-16

## 2022-05-16 RX ORDER — SODIUM CHLORIDE 0.9 % (FLUSH) 0.9 %
10 SYRINGE (ML) INJECTION AS NEEDED
Status: DISPENSED | OUTPATIENT
Start: 2022-05-16 | End: 2022-05-16

## 2022-05-16 RX ORDER — DOCUSATE SODIUM 100 MG/1
100 CAPSULE, LIQUID FILLED ORAL 2 TIMES DAILY
Qty: 60 CAPSULE | Refills: 2 | Status: SHIPPED | OUTPATIENT
Start: 2022-05-16 | End: 2022-08-14

## 2022-05-16 RX ORDER — HYDROCORTISONE SODIUM SUCCINATE 100 MG/2ML
100 INJECTION, POWDER, FOR SOLUTION INTRAMUSCULAR; INTRAVENOUS AS NEEDED
Status: ACTIVE | OUTPATIENT
Start: 2022-05-16 | End: 2022-05-16

## 2022-05-16 RX ORDER — ATROPINE SULFATE 0.4 MG/ML
0.4 INJECTION, SOLUTION ENDOTRACHEAL; INTRAMEDULLARY; INTRAMUSCULAR; INTRAVENOUS; SUBCUTANEOUS
Status: DISPENSED | OUTPATIENT
Start: 2022-05-16 | End: 2022-05-16

## 2022-05-16 RX ORDER — LOPERAMIDE HYDROCHLORIDE 2 MG/1
2 CAPSULE ORAL
Qty: 30 CAPSULE | Refills: 2 | Status: SHIPPED | OUTPATIENT
Start: 2022-05-16 | End: 2022-10-03

## 2022-05-16 RX ORDER — ALBUTEROL SULFATE 90 UG/1
2 AEROSOL, METERED RESPIRATORY (INHALATION) AS NEEDED
Status: ACTIVE | OUTPATIENT
Start: 2022-05-16 | End: 2022-05-16

## 2022-05-16 RX ORDER — SODIUM CHLORIDE 9 MG/ML
25 INJECTION, SOLUTION INTRAVENOUS CONTINUOUS
Status: DISCONTINUED | OUTPATIENT
Start: 2022-05-16 | End: 2022-05-18 | Stop reason: HOSPADM

## 2022-05-16 RX ORDER — DEXTROSE MONOHYDRATE 50 MG/ML
25 INJECTION, SOLUTION INTRAVENOUS CONTINUOUS
Status: DISPENSED | OUTPATIENT
Start: 2022-05-16 | End: 2022-05-16

## 2022-05-16 RX ORDER — PALONOSETRON 0.05 MG/ML
0.25 INJECTION, SOLUTION INTRAVENOUS ONCE
Status: COMPLETED | OUTPATIENT
Start: 2022-05-16 | End: 2022-05-16

## 2022-05-16 RX ORDER — SODIUM CHLORIDE 9 MG/ML
500 INJECTION, SOLUTION INTRAVENOUS CONTINUOUS
Status: DISPENSED | OUTPATIENT
Start: 2022-05-16 | End: 2022-05-16

## 2022-05-16 RX ORDER — DIPHENHYDRAMINE HYDROCHLORIDE 50 MG/ML
50 INJECTION, SOLUTION INTRAMUSCULAR; INTRAVENOUS AS NEEDED
Status: ACTIVE | OUTPATIENT
Start: 2022-05-16 | End: 2022-05-16

## 2022-05-16 RX ADMIN — PALONOSETRON HYDROCHLORIDE 0.25 MG: 0.25 INJECTION, SOLUTION INTRAVENOUS at 09:28

## 2022-05-16 RX ADMIN — IRINOTECAN HYDROCHLORIDE 281 MG: 20 INJECTION, SOLUTION INTRAVENOUS at 10:37

## 2022-05-16 RX ADMIN — SODIUM CHLORIDE 500 ML: 9 INJECTION, SOLUTION INTRAVENOUS at 14:43

## 2022-05-16 RX ADMIN — DEXAMETHASONE SODIUM PHOSPHATE 12 MG: 4 INJECTION, SOLUTION INTRA-ARTICULAR; INTRALESIONAL; INTRAMUSCULAR; INTRAVENOUS; SOFT TISSUE at 09:31

## 2022-05-16 RX ADMIN — LEUCOVORIN CALCIUM 680 MG: 500 INJECTION, POWDER, LYOPHILIZED, FOR SOLUTION INTRAMUSCULAR; INTRAVENOUS at 12:26

## 2022-05-16 RX ADMIN — OXALIPLATIN 144.5 MG: 5 INJECTION, SOLUTION, CONCENTRATE INTRAVENOUS at 12:26

## 2022-05-16 RX ADMIN — FLUOROURACIL 4080 MG: 50 INJECTION, SOLUTION INTRAVENOUS at 15:33

## 2022-05-16 RX ADMIN — DEXTROSE MONOHYDRATE 25 ML/HR: 50 INJECTION, SOLUTION INTRAVENOUS at 09:25

## 2022-05-16 RX ADMIN — PROCHLORPERAZINE EDISYLATE 10 MG: 5 INJECTION INTRAMUSCULAR; INTRAVENOUS at 14:52

## 2022-05-16 NOTE — PROGRESS NOTES
Vanessa Allen is a 42 y.o. male follow up for colon cancer and lymphoma. 1. Have you been to the ER, urgent care clinic since your last visit? Hospitalized since your last visit?no     2. Have you seen or consulted any other health care providers outside of the [address] [place] [address] since your last visit? Include any pap smears or colon screening.  No    Vitals 5/16/2022   Blood Pressure 108/81   Pulse 82   Temp 98.1   Resp 16   Height 5' 7\"   Weight 132 lb 6.4 oz   SpO2 100   BSA 1.69 m2   BMI 20.74 kg/m2

## 2022-05-16 NOTE — PROGRESS NOTES
[facility] VISIT NOTE  Date: May 16, 2022    Name: Gretchen Millan. MRN: [id number]         : 1975    0800  Mr. John Avalos Arrived ambulatory and in no distress for C1D1 of Folfoxiri Regimen. Assessment was completed by Lili LOCKHART RN, no acute issues at this time, no new complaints voiced. Chest wall port accessed without difficulty by the assessment nurse, labs drawn & sent for processing. 1. Do you have any symptoms of COVID-19? SOB, coughing, fever, or generally not feeling well NO    2. Have you been exposed to COVID-19 recently? NO    3. Have you had any recent contact with family/friend that has a pending COVID test? NO       Chemotherapy Flowsheet 2022   Cycle C1D1   Date 2022   Drug / Regimen Folfoxiri   Post Hydration given   Pre Meds given   Notes given           Mr. Naz Del Cid vitals were reviewed. Patient Vitals for the past 12 hrs:   Temp Pulse Resp BP SpO2   22 1535 -- 69 -- 128/81 --   22 0803 98.1 °F (36.7 °C) 82 16 108/81 100 %         Lab results were obtained and reviewed. Recent Results (from the past 12 hour(s))   CBC WITH AUTOMATED DIFF    Collection Time: 22  8:16 AM   Result Value Ref Range    WBC 11.5 (H) 4.1 - 11.1 K/uL    RBC 4.69 4.10 - 5.70 M/uL    HGB 11.6 (L) 12.1 - 17.0 g/dL    HCT 37.5 36.6 - 50.3 %    MCV 80.0 80.0 - 99.0 FL    MCH 24.7 (L) 26.0 - 34.0 PG    MCHC 30.9 30.0 - 36.5 g/dL    RDW 17.3 (H) 11.5 - 14.5 %    PLATELET 993 (H) 453 - 400 K/uL    MPV 10.4 8.9 - 12.9 FL    NRBC 0.0 0  WBC    ABSOLUTE NRBC 0.00 0.00 - 0.01 K/uL    NEUTROPHILS 65 32 - 75 %    LYMPHOCYTES 22 12 - 49 %    MONOCYTES 8 5 - 13 %    EOSINOPHILS 4 0 - 7 %    BASOPHILS 1 0 - 1 %    IMMATURE GRANULOCYTES 0 0.0 - 0.5 %    ABS. NEUTROPHILS 7.5 1.8 - 8.0 K/UL    ABS. LYMPHOCYTES 2.5 0.8 - 3.5 K/UL    ABS. MONOCYTES 0.9 0.0 - 1.0 K/UL    ABS. EOSINOPHILS 0.4 0.0 - 0.4 K/UL    ABS. BASOPHILS 0.1 0.0 - 0.1 K/UL    ABS. IMM.  GRANS. 0.0 0.00 - 0.04 K/UL    DF AUTOMATED     METABOLIC PANEL, COMPREHENSIVE    Collection Time: 05/16/22  8:16 AM   Result Value Ref Range    Sodium 134 (L) 136 - 145 mmol/L    Potassium 3.7 3.5 - 5.1 mmol/L    Chloride 103 97 - 108 mmol/L    CO2 23 21 - 32 mmol/L    Anion gap 8 5 - 15 mmol/L    Glucose 114 (H) 65 - 100 mg/dL    BUN 18 6 - 20 MG/DL    Creatinine 1.35 (H) 0.70 - 1.30 MG/DL    BUN/Creatinine ratio 13 12 - 20      GFR est AA >60 >60 ml/min/1.73m2    GFR est non-AA 57 (L) >60 ml/min/1.73m2    Calcium 9.8 8.5 - 10.1 MG/DL    Bilirubin, total 0.2 0.2 - 1.0 MG/DL    ALT (SGPT) 15 12 - 78 U/L    AST (SGOT) 13 (L) 15 - 37 U/L    Alk. phosphatase 136 (H) 45 - 117 U/L    Protein, total 7.8 6.4 - 8.2 g/dL    Albumin 3.0 (L) 3.5 - 5.0 g/dL    Globulin 4.8 (H) 2.0 - 4.0 g/dL    A-G Ratio 0.6 (L) 1.1 - 2.2     CEA    Collection Time: 05/16/22  8:16 AM   Result Value Ref Range    CEA 19.2 ng/mL   HEP B SURFACE AG    Collection Time: 05/16/22  8:16 AM   Result Value Ref Range    Hepatitis B surface Ag <0.10 Index    Hep B surface Ag Interp. Negative NEG     HEP B SURFACE AB    Collection Time: 05/16/22  8:16 AM   Result Value Ref Range    Hepatitis B surface Ab <3.10 mIU/mL    Hep B surface Ab Interp.  NONREACTIVE NR         Medications received:  Medications Administered     0.9% sodium chloride infusion 500 mL     Admin Date  05/16/2022 Action  New Bag Dose  500 mL Rate  1,000 mL/hr Route  IntraVENous Administered By  Yudith Neri RN          dexamethasone (DECADRON) 12 mg in 0.9% sodium chloride 50 mL IVPB     Admin Date  05/16/2022 Action  New Bag Dose  12 mg Route  IntraVENous Administered By  Yudith Neri RN          dextrose 5% infusion     Admin Date  05/16/2022 Action  New Bag Dose  25 mL/hr Rate  25 mL/hr Route  IntraVENous Administered By  Yudith Neri, ADA          fluorouraciL (ADRUCIL) 4,080 mg in 0.9% sodium chloride 100 mL CADD Cassette     Admin Date  05/16/2022 Action  New Bag Dose  4,080 mg Rate  2.1 mL/hr Route  IntraVENous Administered By  Lilian Duong RN          irinotecan (CAMPTOSAR) 281 mg in dextrose 5% 250 mL, overfill volume 25 mL chemo infusion     Admin Date  05/16/2022 Action  New Bag Dose  281 mg Rate  192.7 mL/hr Route  IntraVENous Administered By  Lilian Duong RN          leucovorin (WELLCOVORIN) 680 mg in dextrose 5% 250 mL, overfill volume 25 mL IVPB     Admin Date  05/16/2022 Action  New Bag Dose  680 mg Rate  154.5 mL/hr Route  IntraVENous Administered By  Lilian Duong RN          oxaliplatin (ELOXATIN) 144.5 mg in dextrose 5% 250 mL, overfill volume 25 mL chemo infusion     Admin Date  05/16/2022 Action  New Bag Dose  144.5 mg Rate  152 mL/hr Route  IntraVENous Administered By  Lilian Duong RN          palonosetron HCl (ALOXI) injection 0.25 mg     Admin Date  05/16/2022 Action  Given Dose  0.25 mg Route  IntraVENous Administered By  Lilian Duong RN          prochlorperazine (COMPAZINE) injection 10 mg     Admin Date  05/16/2022 Action  Given Dose  10 mg Route  IntraVENous Administered By  Lilian Duong RN                 Mr. Sonia Manning  experienced nausea toward the end of his treatment otherwise tolerated treatment well and was discharged from [place] [address] in stable condition at 1540. Port flushed and connected to infusing CADD pump. He is to return on  May 18, 2022 at 1400 for his next appointment.     Benito Ramirez RN  May 16, 2022    Future Appointments:  Future Appointments   Date Time Provider Dahlia Supriya   5/18/2022  2:00 PM SS INF6 CH4 <1H [facility]   5/25/2022  3:30 PM Von Lizarraga MD [facility] BS AMB   5/31/2022  7:30 AM SS INF3 CH2 >7H [facility]   5/31/2022  8:30 AM Keren Mays MD [facility] AMB   6/2/2022  2:30 PM SS INF6 CH4 <1H [facility]   6/13/2022  7:30 AM SS INF1 CH2 >7H [facility]   6/15/2022  2:00 PM SS INF7 CH2 <1H [facility]   6/27/2022  7:30 AM SS INF1 CH2 >7H [facility]   6/27/2022 10:00 AM Luis A Coon DPM [facility] BS AMB   6/29/2022  3:00 PM SS INF7 CH2 <1H [facility]   7/11/2022  7:30 AM SS INF1 CH2 >7H [facility]   7/13/2022  2:00 PM SS INF7 CH2 <1H [facility]   7/25/2022  7:30 AM SS INF1 CH2 >7H [facility]   7/27/2022  2:00 PM SS INF7 CH2 <1H [facility] [address] [place] [address]

## 2022-05-17 LAB — HBV CORE AB SERPL QL IA: NEGATIVE

## 2022-05-18 ENCOUNTER — HOSPITAL ENCOUNTER (OUTPATIENT)
Dept: INFUSION THERAPY | Age: 47
Discharge: HOME | End: 2022-05-18
Payer: MEDICAID

## 2022-05-18 VITALS
TEMPERATURE: 98 F | HEART RATE: 81 BPM | RESPIRATION RATE: 16 BRPM | DIASTOLIC BLOOD PRESSURE: 88 MMHG | SYSTOLIC BLOOD PRESSURE: 121 MMHG

## 2022-05-18 DIAGNOSIS — D50.0 IRON DEFICIENCY ANEMIA DUE TO CHRONIC BLOOD LOSS: ICD-10-CM

## 2022-05-18 DIAGNOSIS — C82.90 FOLLICULAR LYMPHOMA, UNSPECIFIED FOLLICULAR LYMPHOMA TYPE, UNSPECIFIED BODY REGION (HCC): ICD-10-CM

## 2022-05-18 DIAGNOSIS — C18.9 COLON ADENOCARCINOMA (HCC): Primary | ICD-10-CM

## 2022-05-18 PROCEDURE — 74011000250 HC RX REV CODE- 250: Performed by: INTERNAL MEDICINE

## 2022-05-18 PROCEDURE — 74011250636 HC RX REV CODE- 250/636: Performed by: INTERNAL MEDICINE

## 2022-05-18 PROCEDURE — 96523 IRRIG DRUG DELIVERY DEVICE: CPT

## 2022-05-18 RX ORDER — SODIUM CHLORIDE 0.9 % (FLUSH) 0.9 %
10 SYRINGE (ML) INJECTION AS NEEDED
Status: DISCONTINUED | OUTPATIENT
Start: 2022-05-18 | End: 2022-05-19 | Stop reason: HOSPADM

## 2022-05-18 RX ORDER — SODIUM CHLORIDE 9 MG/ML
10 INJECTION INTRAMUSCULAR; INTRAVENOUS; SUBCUTANEOUS AS NEEDED
Status: DISCONTINUED | OUTPATIENT
Start: 2022-05-18 | End: 2022-05-19 | Stop reason: HOSPADM

## 2022-05-18 RX ORDER — HEPARIN 100 UNIT/ML
300-500 SYRINGE INTRAVENOUS AS NEEDED
Status: DISCONTINUED | OUTPATIENT
Start: 2022-05-18 | End: 2022-05-19 | Stop reason: HOSPADM

## 2022-05-18 RX ADMIN — SODIUM CHLORIDE, PRESERVATIVE FREE 10 ML: 5 INJECTION INTRAVENOUS at 14:13

## 2022-05-18 RX ADMIN — Medication 500 UNITS: at 14:13

## 2022-05-20 RX ORDER — DIPHENHYDRAMINE HYDROCHLORIDE 50 MG/ML
50 INJECTION, SOLUTION INTRAMUSCULAR; INTRAVENOUS AS NEEDED
Status: CANCELLED
Start: 2022-05-31

## 2022-05-20 RX ORDER — EPINEPHRINE 1 MG/ML
0.3 INJECTION, SOLUTION, CONCENTRATE INTRAVENOUS AS NEEDED
Status: CANCELLED | OUTPATIENT
Start: 2022-05-31

## 2022-05-20 RX ORDER — HYDROCORTISONE SODIUM SUCCINATE 100 MG/2ML
100 INJECTION, POWDER, FOR SOLUTION INTRAMUSCULAR; INTRAVENOUS AS NEEDED
Status: CANCELLED | OUTPATIENT
Start: 2022-05-31

## 2022-05-20 RX ORDER — ALBUTEROL SULFATE 0.83 MG/ML
2.5 SOLUTION RESPIRATORY (INHALATION) AS NEEDED
Status: CANCELLED
Start: 2022-05-31

## 2022-05-20 RX ORDER — SODIUM CHLORIDE 0.9 % (FLUSH) 0.9 %
10 SYRINGE (ML) INJECTION AS NEEDED
Status: CANCELLED | OUTPATIENT
Start: 2022-06-02

## 2022-05-20 RX ORDER — ACETAMINOPHEN 325 MG/1
650 TABLET ORAL AS NEEDED
Status: CANCELLED
Start: 2022-05-31

## 2022-05-20 RX ORDER — SODIUM CHLORIDE 9 MG/ML
10 INJECTION INTRAMUSCULAR; INTRAVENOUS; SUBCUTANEOUS AS NEEDED
Status: CANCELLED | OUTPATIENT
Start: 2022-06-02

## 2022-05-20 RX ORDER — DIPHENHYDRAMINE HYDROCHLORIDE 50 MG/ML
25 INJECTION, SOLUTION INTRAMUSCULAR; INTRAVENOUS AS NEEDED
Status: CANCELLED
Start: 2022-05-31

## 2022-05-20 RX ORDER — HEPARIN 100 UNIT/ML
300-500 SYRINGE INTRAVENOUS AS NEEDED
Status: CANCELLED
Start: 2022-06-02

## 2022-05-20 RX ORDER — ATROPINE SULFATE 0.4 MG/ML
0.4 INJECTION, SOLUTION ENDOTRACHEAL; INTRAMEDULLARY; INTRAMUSCULAR; INTRAVENOUS; SUBCUTANEOUS
Status: CANCELLED | OUTPATIENT
Start: 2022-05-31

## 2022-05-20 RX ORDER — ONDANSETRON 2 MG/ML
8 INJECTION INTRAMUSCULAR; INTRAVENOUS AS NEEDED
Status: CANCELLED | OUTPATIENT
Start: 2022-05-31

## 2022-05-22 NOTE — PROGRESS NOTES
Palliative Medicine Outpatient Services  [place]: [phone] ([phone])    Patient Name: Glenn Santillan YOB: 1975     Date of Current Visit: 05/25/22  Location of Current Visit:    [] [facility] Office  [] [facility] Office  [] [address] [place] [address]  [] Home  [x] Other:  televisit    Date of Initial Visit: 2/19/19   Referral from: Erick Barrientos MD  Primary Care Physician: Solis Chaudhry MD      SUMMARY:   Glenn Santillan is a 42y.o. year old with high-grade follicular lymphoma, who was referred to Palliative Medicine by Dr. Cookie Tripathi for symptom management and supportive care. He was diagnosed in 11/2018 after presenting with back pain, treated with systemic chemotherapy with complete response. He suffered cardiac arrest during cycle #3 due to previously undiagnosed severe stenosis of the LAD s/p PTCA and stent. He was diagnosed with poorly differentiated carcinoma of the colon (no evidence of metastatic disease) in 2/14/22 and underwent loop ileostomy at [facility] on 2/19/22. He underwent exploratory laparoscopy in 4/2022 which revealed a large tumor at the hepatic flexure peritoneal carcinomatosis. He is currently being treated with systemic chemotherapy under the care of Dr. Elizabeth Lee. The patients social history includes: he is single. He lives in [place] with his girlfriend, Manas Kaminski, and their 12-month old son. Kait Orlando He has 3 teenage children who live with their mother and with whom he has close contact. 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 Palliative Medicine is addressing the following current patient/family concerns: abdominal pain related to malignancy; anxiety, depression; left mid- and low back pain, poor appetite, fatigue, advanced care planning. Initial Referral Intake note from Ana Sullivan RN reviewed prior to visit   PALLIATIVE DIAGNOSES:       ICD-10-CM ICD-9-CM    1.  Abdominal pain, generalized  R10.84 789.07 oxyCODONE ER (OxyCONTIN) 10 mg ER tablet   2. Anxiety  F41.9 300.00    3. Reactive depression  F32.9 300.4    4. Poor appetite  R63.0 783.0    5. Nausea without vomiting  R11.0 787.02    6. Colon adenocarcinoma (HCC)  C18.9 153.9 oxyCODONE ER (OxyCONTIN) 10 mg ER tablet   7. Peritoneal carcinomatosis (HCC)  C78.6 197.6 oxyCODONE ER (OxyCONTIN) 10 mg ER tablet   8. Malignant neoplasm of hepatic flexure (HCC)  C18.3 153.0           PLAN:   Patient Instructions     Dear Silvia Gamboa. ,    It was a pleasure seeing you today via virtual visit. We will see you again in 4 weeks for a virtual visit. If labs or imaging tests have been ordered for you today, please call the office  at [phone] 48 hours after completion to obtain the results. Your described symptoms were: Fatigue: 5 Drowsiness: 6   Depression: 2 Pain: 6   Anxiety: 8 Nausea: 2   Anorexia: 5 Dyspnea: 1   Best Well-Bein Constipation: Yes   Other Problem (Comment): 0       This is the plan we talked about:    1. Abdominal pain  -Start extended-release oxycodone 10-mg every 12 hours  -Continue oxycodone 10-mg every 4 hours as needed    2. Depression/anxiety  -You have chronic anxiety which is unchanged  -Continue Lexapro 20-mg daily  -I'm avoiding benzodiazepines due to synergistic effect with opioids    3. Poor appetite/weight loss  -Continue eating your one good meal a day  -Try to eat smaller amounts of food throughout the day (4 to 5 times) to keep up with your nutritional needs as you've lost a small amount of weight since your last visit    4. Fatigue  -This is chronic and unchanged     5. Nausea  -Start ondansetron 8-mg every 8 hours as needed    5.  Colon mass  -You're scheduled for resection and colostomy reversal next week  -You'll meet with Dr. Wagner Mathews after your surgery to discuss additional treatment options    This is what you have shared with us about Advance Care Planning:      Primary Decision Maker: Romel Ayers - Girlfriend - [phone]  Advance Care Planning 5/25/2022   Patient's Healthcare Decision Maker is: Legal Next of Kin   Confirm Advance Directive None   Patient Would Like to Complete Advance Directive -   Does the patient have other document types -           The Palliative Medicine Team is here to support you and your family. Sincerely,      Rhoda Carias MD and the Palliative Medicine Team       GOALS OF CARE / TREATMENT PREFERENCES:   [====Goals of Care====]  GOALS OF CARE:  Patient / health care proxy stated goals: See Patient Instructions / Summary    TREATMENT PREFERENCES:   Code Status:  [x] Attempt Resuscitation       [] Do Not Attempt Resuscitation    Advance Care Planning:  [x] The Pall Med Interdisciplinary Team has updated the ACP Navigator with Decision Maker and Patient Capacity      Primary Decision Maker: Jess Ortez - Girlfrienangelique - [phone]    [] Named in a scanned document   [x] Legal Next of Kin  [] Guardian    Other:  (If patient appropriate for POST, consider using PALLPOST smart phrase here)    The palliative care team has discussed with patient / health care proxy about goals of care / treatment preferences for patient.  [====Goals of Care====]     PRESCRIPTIONS GIVEN:     Medications Ordered Today   Medications    oxyCODONE ER (OxyCONTIN) 10 mg ER tablet     Sig: Take 1 Tablet by mouth every twelve (12) hours for 30 days. Max Daily Amount: 20 mg.      Dispense:  60 Tablet     Refill:  0           FOLLOW UP:     Future Appointments   Date Time Provider Dahlia Epps   5/31/2022  7:30 AM SS INF3 CH2 >7H [facility]   5/31/2022  8:30 AM Jaron Morales MD [facility] BS AMB   6/2/2022  2:30 PM SS INF6 CH4 <1H [facility]   6/13/2022  7:30 AM SS INF1 CH2 >7H [facility]   6/15/2022  2:00 PM SS INF7 CH2 <1H [facility]   6/27/2022  7:30 AM SS INF1 CH2 >7H [facility]   6/27/2022 10:00 AM Luis A Coon DPM [facility] BS AMB   6/29/2022  3:00 PM SS INF7 CH2 <1H [facility]   7/11/2022  7:30 AM SS INF1 CH2 >7H [facility]   7/13/2022  2:00 PM [facility]   7/25/2022  7:30 AM [facility]   7/27/2022  2:00 PM SS INF7 CH2 <1H [facility]           PHYSICIANS INVOLVED IN CARE:   Patient Care Team:  Odilon Garcia MD as PCP - General (Family Medicine)  Mario Benavides MD (Hematology and Oncology)  Minnie Arauz MD as Physician (Palliative Medicine)  Minnie Arauz MD as Physician (Palliative Medicine)       HISTORY:   Reviewed patient-completed ESAS and advance care planning form. Reviewed patient record in prescription monitoring program.    CHIEF COMPLAINT:   Chief Complaint   Patient presents with    Abdominal Pain       HPI/SUBJECTIVE:    The patient is: [x] Verbal / [] Nonverbal     He's having more abdominal pain since his surgery 4/27  He continues to take oxycodone every 4 to 5 hours around-the-clock. He gets some relief when he takes it but not as much as he used to. He started chemo last week and felt \"yukky\" for a few days afterwards, feeling very tired and nauseous. He continues to eat one meal daily. He's passing stool in his ostomy bag daily. See Plan/Patient Instructions for additional interval history      From visit 3/2/22:  He started having abdominal pain about a month ago. Then he started feeling nauseous. He had trouble with bowel movements, feeling like he wasn't completing emptying his bowels. He went to the ED on 2/14, CT scan showed mass in his colon. He was hospitalized at [address] [place] [address] 2/15-2/25 for colonoscopy and loop ileostomy. He's still having pain in his abdomen. He's been taking 2 oxycodone every 4 to 6 hours which eases the pain to ~5/10 which is tolerable. He's always anxious. He continues to take Lexapro. He's eating, not as much as he used to. He ate 2 PBJs yesterday. He had mild nausea for a few days after he came home.     He's passing formed stool in his ostomy bag daily. Home health is coming to his home. He sees Dr. Little Villegas next week. From IV 2/19/19:  He has a lot of anxiety. He's lived with anxiety for a long time, sometimes it's been worse than others, like when he lost his job and lost his insurance. He took Wellbutrin then which made him more anxious and depressed. He's had more anxiety since he was diagnosed with lymphoma. He's been taking lorazepam and it doesn't help at all, even when he tried taking 2 pills. This is his biggest problem now. He feels depressed but it's not as bad as the anxiety.     He has pain in his back, that's what brought him to the hospital in November. He's been taking oxycodone which helps some. The groin pain started after his operation (cardiac cath) in the hospital last week. He expects that will get better as it heals. His pain doesn't bother him too much, not like the anxiety.     His appetite is getting better. He's lost ~30# since last fall but that's leveled off now.     He's moving his bowels regularly.     He sometimes gets short of breath but not as much as used to.     He doesn't have chest pain, never did.     He sleeps well at night. He doesn't have the energy to do much during the day.     He lives with his father. He sees his three teen-age children frequently (they live with their mother). His children give him a lot of strength.         Clinical Pain Assessment (nonverbal scale for nonverbal patients):   [++++ Clinical Pain Assessment++++]  [++++Pain Severity++++]: Pain: 6  [++++Pain Character++++]: stabbing pain in back  [++++Pain Duration++++]: months for back pain, weeks for groin pain  [++++Pain Effect++++]: little  [++++Pain Factors++++]: oxycodone helps with back pain, groin pain elicited by standing and walking  [++++Pain Frequency++++]: constant back pain with varying intensity  [++++Pain Location++++]: left lower back  [++++ Clinical Pain Assessment++++]    [++++ Clinical Pain Assessment++++]  [++++Pain Severity++++]: Pain: 6  [++++Pain Character++++]: deep, aching  [++++Pain Duration++++]: since 2022  [++++Pain Effect++++]: limits function, emotional distress  [++++Pain Factors++++]: unable to identify provoking factors  [++++Pain Frequency++++]: constant  [++++Pain Location++++]: right lower abdomen  [++++ Clinical Pain Assessment++++]         FUNCTIONAL ASSESSMENT:     Palliative Performance Scale (PPS):  PPS: 70       PSYCHOSOCIAL/SPIRITUAL SCREENING:     Any spiritual / Yarsanism concerns:  [] Yes /  [x] No    Caregiver Burnout:  [] Yes /  [x] No /  [] No Caregiver Present      Anticipatory grief assessment:   [x] Normal  / [] Maladaptive       ESAS Anxiety: Anxiety: 8    ESAS Depression: Depression: 2       REVIEW OF SYSTEMS:     The following systems were [x] reviewed / [] unable to be reviewed  Systems: constitutional, ears/nose/mouth/throat, respiratory, gastrointestinal, genitourinary, musculoskeletal, integumentary, neurologic, psychiatric, endocrine. Positive findings noted below. Modified ESAS Completed by: provider   Fatigue: 5 Drowsiness: 6   Depression: 2 Pain: 6   Anxiety: 8 Nausea: 2   Anorexia: 5 Dyspnea: 1   Best Well-Bein Constipation: No   Other Problem (Comment): 0          PHYSICAL EXAM:     Wt Readings from Last 3 Encounters:   22 132 lb 6.4 oz (60.1 kg)   22 132 lb (59.9 kg)   22 135 lb (61.2 kg)     There were no vitals taken for this visit.   Last bowel movement: See Nursing Note    Constitutional    [] Appears well-developed and well-nourished in no apparent distress    [x] Abnormal: appears fatigued  Mental status  [x] Alert and awake  [x] Oriented to person/place/time  [x] Able to follow commands  [] Abnormal:   Eyes  [x] EOM normal   [x] Sclera normal   [x] No visible ocular discharge  [] Abnormal:   HENT  [x] Normocephalic, atraumatic  [x] Mouth/Throat: Moist mucous membranes   [x] External Ears normal  [] Abnormal:  Neck  [x] No visualized mass  [] Abnormal:  Pulmonary/Chest   [x] Respiratory effort normal  [x] No visualized signs of difficulty breathing or respiratory distress  [] Abnormal:  Musculoskeletal  [x] Normal gait with no signs of ataxia  [x] Normal range of motion of neck  [] Abnormal:  Neurological:   [x] No facial asymmetry (Cranial nerve 7 motor function)  [x] No gaze palsy  [] Abnormal:   Skin  [x] No significant exanthematous lesions or discoloration noted on facial skin  [] Abnormal:                                  Psychiatric  [x] Normal affect  [x] No hallucinations  [] Abnormal:    Other pertinent observable physical exam findings:    Due to this being a TeleHealth evaluation, many elements of the physical examination are unable to be assessed. HISTORY:     Past Medical History:   Diagnosis Date    Anxiety     Anxiety and depression     Cancer [facility])     lymphoma Nov 2018 receiving chemo    Cancer ([facility] 02/2022    COLON    Chronic pain     lower back- lymphoma    Hyperlipidemia     Hypertension     NO MEDICATION FOR PAST 9 MONTHS    Lymphadenopathy 11/12/2018    Seizures ([facility] CHILDHOOD    NEVER TOOK MEDICATION - \"GREW OUT OF THEM\"      Past Surgical History:   Procedure Laterality Date    HX COLONOSCOPY      HX HEART CATHETERIZATION  02/2019    HX ILEOSTOMY      HX OTHER SURGICAL  02/2019    cardiac stent    IR INJECTION PSEUDOANEURYSM  2/26/2019    OH ABDOMEN SURGERY PROC UNLISTED  02/2022    COLON RESECTION WITH ILEOSTOMY    [place] CARDIAC SURG PROCEDURE UNLIST  2019    STENT X1      Family History   Problem Relation Age of Onset    Hypertension Father     Diabetes Father     Cancer Father         PROSTATE    Diabetes Mother     No Known Problems Brother     No Known Problems Brother     Anesth Problems Neg Hx       History reviewed, no pertinent family history.   Social History     Tobacco Use    Smoking status: Current Every Day Smoker     Packs/day: 0.50     Years: 20.00     Pack years: 10.00    Smokeless tobacco: Never Used    Tobacco comment: \"STOP SMOKING\" PACKET GIVEN TO PATIENT   Substance Use Topics    Alcohol use: No     No Known Allergies   Current Outpatient Medications   Medication Sig    oxyCODONE ER (OxyCONTIN) 10 mg ER tablet Take 1 Tablet by mouth every twelve (12) hours for 30 days. Max Daily Amount: 20 mg.    oxyCODONE IR (ROXICODONE) 10 mg tab immediate release tablet Take 1 Tablet by mouth every four (4) hours as needed for Pain for up to 15 days. Max Daily Amount: 60 mg.    ondansetron hcl (ZOFRAN) 8 mg tablet Take 1 Tablet by mouth every eight (8) hours as needed for Nausea or Vomiting.  prochlorperazine (Compazine) 10 mg tablet Take 1 Tablet by mouth every six (6) hours as needed for Nausea or Vomiting.  lidocaine-prilocaine (EMLA) topical cream Apply a dime size amount to port site 30 minutes before treatment to prevent pain.  dexAMETHasone (DECADRON) 4 mg tablet Take 8mg (2 x tabs) on days 2 and 3 after treatment to prevent nausea. Take in the AM with food.  multivitamin (ONE A DAY) tablet Take 1 Tablet by mouth daily.  atorvastatin (LIPITOR) 40 mg tablet TAKE 1 TAB BY MOUTH NIGHTLY. INDICATIONS: TREATMENT TO SLOW PROGRESSION OF CORONARY ARTERY DISEASE    docusate sodium (COLACE) 100 mg capsule Take 1 Capsule by mouth two (2) times a day for 90 days. (Patient not taking: Reported on 5/25/2022)    loperamide (Imodium A-D) 2 mg capsule Take 1 Capsule by mouth four (4) times daily as needed for Diarrhea. (Patient not taking: Reported on 5/25/2022)    senna-docusate (PERICOLACE) 8.6-50 mg per tablet Take 1 Tablet by mouth daily as needed for Constipation. (Patient not taking: Reported on 5/25/2022)     No current facility-administered medications for this visit.           LAB DATA REVIEWED:     Lab Results   Component Value Date/Time    WBC 11.5 (H) 05/16/2022 08:16 AM    HGB 11.6 (L) 05/16/2022 08:16 AM    PLATELET 339 (H) 52/09/5958 08:16 AM Lab Results   Component Value Date/Time    Sodium 134 (L) 05/16/2022 08:16 AM    Potassium 3.7 05/16/2022 08:16 AM    Chloride 103 05/16/2022 08:16 AM    CO2 23 05/16/2022 08:16 AM    BUN 18 05/16/2022 08:16 AM    Creatinine 1.35 (H) 05/16/2022 08:16 AM    Calcium 9.8 05/16/2022 08:16 AM    Magnesium 1.7 01/12/2019 04:05 AM    Phosphorus 2.0 (L) 01/12/2019 04:05 AM      Lab Results   Component Value Date/Time    Alk. phosphatase 136 (H) 05/16/2022 08:16 AM    Protein, total 7.8 05/16/2022 08:16 AM    Albumin 3.0 (L) 05/16/2022 08:16 AM    Globulin 4.8 (H) 05/16/2022 08:16 AM     Lab Results   Component Value Date/Time    INR 1.1 02/22/2019 08:18 PM    Prothrombin time 10.8 02/22/2019 08:18 PM    aPTT 27.8 02/22/2019 08:18 PM      Lab Results   Component Value Date/Time    Iron 18 (L) 05/06/2022 11:13 AM    TIBC 173 (L) 05/06/2022 11:13 AM    Iron % saturation 10 (L) 05/06/2022 11:13 AM    Ferritin 426 (H) 05/06/2022 11:13 AM          MRI lumbar spine 12/18/19:  Congenital narrowing of the lumbar canal. Vertebral body heights are maintained. Marrow signal is normal.     The conus medullaris terminates at T12/L1. Signal and caliber of the distal  spinal cord are within normal limits. There is no pathologic intrathecal  enhancement.     The paraspinal soft tissues are within normal limits.     Lower thoracic spine: No herniation or stenosis.     L1-L2: No herniation or stenosis. L2-L3: No herniation or stenosis. L3-L4: Mild facet arthropathy. Minimal central disc protrusion. Mild canal  stenosis. Foramina are patent  L4-L5: Desiccation. Mild facet arthropathy. Canal demonstrates minimal stenosis. There is an annular ligament tear far laterally on the left. Mild left foraminal  stenosis. L5-S1: Mild facet arthropathy. Canal and foramina are patent. IMPRESSION:  Mild disc degenerative change at L3-4 and L4-5. Mild canal stenosis at L3-4 and mild left foraminal stenosis at L4-5.   Other less severe degenerative findings are as described above. CT chest/abdomen 12/16/19:  THYROID: No nodule. MEDIASTINUM: No mass or lymphadenopathy. Port catheter in place on the right. Catheter tip in appropriate position. SB: No mass or lymphadenopathy. THORACIC AORTA: No dissection or aneurysm. MAIN PULMONARY ARTERY: Unremarkable  TRACHEA/BRONCHI: Unremarkable  ESOPHAGUS: No wall thickening or dilatation. HEART: Normal in size. PLEURA: No effusion or pneumothorax. LUNGS: Bibasilar atelectasis is noted. Rometta Favre LIVER: No mass or biliary dilatation. GALLBLADDER: Layering contrast versus cholelithiasis. SPLEEN: No mass. PANCREAS: No mass or ductal dilatation. ADRENALS: The left adrenal gland is elevated by the retroperitoneal mass. The    right is unremarkable. KIDNEYS: There is diminished soft tissue density at the level of the left renal  hilum. There is increased left renal cortical atrophy. STOMACH: Unremarkable. SMALL BOWEL: No dilatation or wall thickening. COLON: No dilatation or wall thickening. APPENDIX: Unremarkable. PERITONEUM: No ascites or pneumoperitoneum. RETROPERITONEUM:   Diminished size of retroperitoneal mass. Diminished in size with margins difficult to fully ascertain. 2-62 35 x 50 mm previously 71 x 94 mm.     2-67 left periaortic adenopathy 25 x 17 mm  The SMA, celiac, and LIZZY are remain encased, diminished attenuation of these  vessels.      REPRODUCTIVE ORGANS: The prostate and seminal vesicles are unremarkable. URINARY  BLADDER: Unremarkable  BONES: No destructive bone lesion. ADDITIONAL COMMENTS: Resolved left inguinal pseudoaneurysm. .       IMPRESSION:    Baseline research examination. Findings are consistent with interval response to therapy. Continued diminished size of retroperitoneal mass-adenopathy,  with diminished soft tissue density at the left renal hilum. CT chest/abdomen/pelvis 2/14/22:  1.  Annular mass lesion of the right colon with upstream fecal retention  concerning for primary colon neoplasm versus less likely lymphoma. 2. Soft tissue stranding around celiac axis, SMA, and abdominal aorta may  reflect infiltrative lymphoma. 3. Left renal atrophy with left hydronephrosis likely reflecting chronic  proximal ureteral obstruction. 4. Incidentals as above. CONTROLLED SUBSTANCES SAFETY ASSESSMENT (IF ON CONTROLLED SUBSTANCES):     Reviewed opioid safety handout:  [x] Yes   [] No  24 hour opioid dose >150mg morphine equivalent/day:  [] Yes   [x] No  Benzodiazepines:  [x] Yes   [] No  Sleep apnea:  [] Yes   [x] No  Urine Toxicology Testing within last 6 months:  [] Yes   [x] No  History of or new aberrant medication taking behaviors:  [] Yes   [x] No  Has Narcan been prescribed [x] Yes   [] No          Total time:   Counseling / coordination time:   > 50% counseling / coordination?:     Tiffanie Guzman Sr. , was evaluated through a synchronous (real-time) audio-video encounter. The patient (or guardian if applicable) is aware that this is a billable service, which includes applicable co-pays. This Virtual Visit was conducted with patient's (and/or legal guardian's) consent. The visit was conducted pursuant to the emergency declaration under the [address] [place] [address] waiver authority and the [employer] General Act. Patient identification was verified, and a caregiver was present when appropriate. The patient was located in a state where the provider was licensed to provide care.

## 2022-05-25 ENCOUNTER — VIRTUAL VISIT (OUTPATIENT)
Dept: PALLATIVE CARE | Age: 47
End: 2022-05-25
Payer: COMMERCIAL

## 2022-05-25 DIAGNOSIS — C78.6 PERITONEAL CARCINOMATOSIS (HCC): ICD-10-CM

## 2022-05-25 DIAGNOSIS — C18.3 MALIGNANT NEOPLASM OF HEPATIC FLEXURE (HCC): ICD-10-CM

## 2022-05-25 DIAGNOSIS — R11.0 NAUSEA WITHOUT VOMITING: ICD-10-CM

## 2022-05-25 DIAGNOSIS — C18.9 COLON ADENOCARCINOMA (HCC): ICD-10-CM

## 2022-05-25 DIAGNOSIS — F41.9 ANXIETY: ICD-10-CM

## 2022-05-25 DIAGNOSIS — F32.9 REACTIVE DEPRESSION: ICD-10-CM

## 2022-05-25 DIAGNOSIS — R63.0 POOR APPETITE: ICD-10-CM

## 2022-05-25 DIAGNOSIS — R10.84 ABDOMINAL PAIN, GENERALIZED: Primary | ICD-10-CM

## 2022-05-25 PROCEDURE — 99214 OFFICE O/P EST MOD 30 MIN: CPT | Performed by: INTERNAL MEDICINE

## 2022-05-25 RX ORDER — OXYCODONE HCL 10 MG/1
10 TABLET, FILM COATED, EXTENDED RELEASE ORAL EVERY 12 HOURS
Qty: 60 TABLET | Refills: 0 | Status: SHIPPED | OUTPATIENT
Start: 2022-05-25 | End: 2022-06-20 | Stop reason: SDUPTHER

## 2022-05-25 NOTE — PATIENT INSTRUCTIONS
Deafernando Monroy ,    It was a pleasure seeing you today via virtual visit. We will see you again in 3 weeks for an office visit to coordinate with your infusion. If labs or imaging tests have been ordered for you today, please call the office  at [phone] 48 hours after completion to obtain the results. Your described symptoms were: Fatigue: 5 Drowsiness: 6   Depression: 2 Pain: 6   Anxiety: 8 Nausea: 2   Anorexia: 5 Dyspnea: 1   Best Well-Bein Constipation: No   Other Problem (Comment): 0       This is the plan we talked about:    1. Abdominal pain  -Start extended-release oxycodone 10-mg every 12 hours  -Continue oxycodone 10-mg every 4 hours as needed    2. Depression/anxiety  -You have chronic anxiety which is unchanged  -Continue Lexapro 20-mg daily  -I'm avoiding benzodiazepines due to synergistic effect with opioids    3. Poor appetite/weight loss  -Continue eating your one good meal a day  -Try to eat smaller amounts of food throughout the day (4 to 5 times) to keep up with your nutritional needs as you've lost a small amount of weight since your last visit    4. Fatigue  -This is chronic and unchanged     5. Nausea  -Start ondansetron 8-mg every 8 hours as needed    5. Colon mass  -You're scheduled for resection and colostomy reversal next week  -You'll meet with Dr. Little Villegas after your surgery to discuss additional treatment options    This is what you have shared with us about Advance Care Planning:      Primary Decision Maker: Fawn Geller - Girlfriend - [phone]  Advance Care Planning 2022   Patient's Healthcare Decision Maker is: Legal Next of Kin   Confirm Advance Directive None   Patient Would Like to Complete Advance Directive -   Does the patient have other document types -           The Palliative Medicine Team is here to support you and your family.        Sincerely,      Enmanuel Marsh MD and the Palliative Medicine Team

## 2022-05-25 NOTE — PROGRESS NOTES
[id number] [facility] at [address] [place] [address]  [phone]    Hematology / Oncology Established Visit    Reason for Visit:   Adali Mejia is a 42 y.o. male who is seen for urgent follow up of colon cancer, h/o lymphoma. Hematology Oncology Treatment History:     Diagnosis #1: Follicular lymphoma  Stage: IV  Pathology:   11/13/18 right inguinal LN excision: Follicular lymphoma, high-grade (grade 3a of 3). Prior Treatment:   1. Obinutuzumab-CHOP. Obinutuzumab: 1000 mg weekly on days 1, 8, 15 for cycle 1, then 1000 mg on day 1 q21 days for cycles 2-6, then monotherapy 1000 mg every 21 days for cycle 7, 8 with Cyclophosphamide 750mg/m2, Doxorubicin 50mg/m2, Vincristine 1.4mg/m2 on day 1 and Prednisone 100mg on Days 1-5, every 21 days for a total of 2 cycles completed late 1/2019. Regimen discontinued due to STEMI. 2. Obinutuzumab + Bendamustine: 1000 mg Obinutuzumab on day 1 + Bendamustine 90mg/m2 on days 1-2 on a 28-day cycle x 4 cycles, completed 5/2019  Current Treatment: Surveillance      Diagnosis #2: Colon cancer:  Stage: pending  Pathology:  Ascending colon; biopsy: poorly differentiated carcinoma  Comment: No dysplasia is identified. Immunohistochemical stains performed on block 1A, show the tumor positive for Cam5.2 and shows patchy positivity for CDX2 with a Ki-67 index of -90%; the tumor is focally positive for CK5/6 and GATA3; negative for p63, Pax8, CK7, CK20, panmelanoma, synaptophysin and chromogranin. The immunophenotypic features are nonspecific but argues somewhat against the following carcinoma: urothelial, neuroendocrine and breast. The immunophenotypic features also argues against melanoma and somewhat against classic colorectal carcinoma. Additional immunohistochemical stains to exclude the possibility of prostate and hepatocellular carcinoma have been   ordered; the results will be reported as an addendum.   MLH1/MSH2/MSH6/PMS2 all intact with no loss of nuclear expression of MMR proteins - no MSI   Addendum: The addendum is issued to report the results of additional immunohistochemical stains in an attempt to ascertain the primary site of the carcinoma. The diagnosis is unchanged. Hepar and glypican 3 immunohistochemical stains are neg, arguing against hepatocellular carcinoma. PSA stain is negative, arguing against prostatic primary. Imaging studies to eval for poss primary sites maybe useful. In the absence of carcinoma/tumor at any other sites, this may represent an undifferentiated carcinoma of the colon. Oncogenomics (molecular) studies: POLE< ARID1A, YVONNE, ATR, BRCA2, CHECK1, FANCI, NF1, PIK3CA, PTCH1, PTEN, RAD50, RAD51, RB1, SMARCA4, STK11    Prior Treatment: Loop ileostomy 2/19/22  Current Treatment: planned for R hemicolectomy, ileostomy reversal 4/27/22       Oncologic History:  Was in his usual state of health in early November 2018 when he developed low back pain and presented to the ER. Work-up at outside hospital revealed a retroperitoneal mass seen on CT imaging, and he was transferred to [facility] for further work-up. CT there showed a large retroperitoneal mass encircling the aorta with invasion of the left renal hilum and left adrenal gland. There were bilateral inguinal lymph nodes and moderate left hydronephrosis. He was evaluated while at [facility] and was noted to have palpable nodes in his groin for approximately the past 1 month. He underwent excisional LN biopsy of right inguinal LN, which revealed follicular lymphoma. At time of diagnosis, he had no cardiac disease aside from HTN, hyperlipidemia. However, he did have an NSTEMI after cycle 2 of O-CHOP. Was likely unrelated to chemotherapy, but opted to switch treatment to Obinutuzumab-Bendamustine. He completed treatment in 5/2019 and had a CR based on PET.     History of Present Illness:   Mr. John Avalos is a 42 y.o. male with prior h/o follicular lymphoma is seen for follow up of colon cancer and C2 of treatment. He denies any diarrhea, fatigue, CP, SOB. He did have nausea without vomiting on days 3-7 and took antiemetics prn, which helped. PMHx: Lymphoma in 2018, CAD, HTN, Hyperlipidemia, Anxiety, chronic low back pain  PSurgHx: None aside from cardiac stent placement and port placement  SHx: Smokes 1/2ppd x past 20 yrs. Works part time as a cook. Has 2 children. FHx: Father had leukemia, passed away from pancreatic cancer. Review of Systems: A complete review of systems was obtained, negative except as described above. Physical Exam:     Visit Vitals  /84   Pulse 92   Temp 97.8 °F (36.6 °C)   Ht 5' 7\" (1.702 m)   Wt 131 lb 11.2 oz (59.7 kg)   SpO2 95%   BMI 20.63 kg/m²       ECOG PS: 0  General: no distress  Eyes: anicteric sclerae  HENT: oropharynx clear  Neck: supple  Lymphatic: no cervical, supraclavicular adenopathy  Respiratory: normal respiratory effort  CV: no peripheral edema, port upper chest   GI: soft, nontender, nondistended, no masses;  colostomy in place. Skin: no rashes; no ecchymoses; no petechiae      Results:     Lab Results   Component Value Date/Time    WBC 5.7 05/31/2022 07:56 AM    HGB 11.4 (L) 05/31/2022 07:56 AM    HCT 35.7 (L) 05/31/2022 07:56 AM    PLATELET 568 21/51/6070 07:56 AM    MCV 78.3 (L) 05/31/2022 07:56 AM    ABS.  NEUTROPHILS 3.1 05/31/2022 07:56 AM    Hemoglobin (POC) 15.0 06/05/2009 02:13 PM    Hematocrit (POC) 39 02/14/2019 01:24 PM     Lab Results   Component Value Date/Time    Sodium 136 05/31/2022 07:56 AM    Potassium 3.7 05/31/2022 07:56 AM    Chloride 104 05/31/2022 07:56 AM    CO2 25 05/31/2022 07:56 AM    Glucose 107 (H) 05/31/2022 07:56 AM    BUN 9 05/31/2022 07:56 AM    Creatinine 1.00 05/31/2022 07:56 AM    GFR est AA >60 05/31/2022 07:56 AM    GFR est non-AA >60 05/31/2022 07:56 AM    Calcium 9.2 05/31/2022 07:56 AM    Sodium (POC) 136 02/14/2019 01:24 PM    Potassium (POC) 3.9 02/14/2019 01:24 PM    Chloride (POC) 102 02/14/2019 01:24 PM    Glucose (POC) 249 (H) 02/15/2019 10:21 PM    BUN (POC) 14 2019 01:24 PM    Creatinine (POC) 0.9 2019 01:24 PM    Calcium, ionized (POC) 1.24 2019 01:24 PM     Lab Results   Component Value Date/Time    Bilirubin, total 0.4 2022 07:56 AM    ALT (SGPT) 35 2022 07:56 AM    Alk. phosphatase 130 (H) 2022 07:56 AM    Protein, total 7.0 2022 07:56 AM    Albumin 2.4 (L) 2022 07:56 AM    Globulin 4.6 (H) 2022 07:56 AM     Lab Results   Component Value Date/Time    Iron 18 (L) 2022 11:13 AM    TIBC 173 (L) 2022 11:13 AM    Iron % saturation 10 (L) 2022 11:13 AM    Ferritin 426 (H) 2022 11:13 AM       No results found for: B12LT, FOL, RBCF  Lab Results   Component Value Date/Time    TSH 1.53 2016 04:40 AM       Lab Results   Component Value Date/Time    Hepatitis A, IgM NONREACTIVE 2018 04:53 PM    Hepatitis B surface Ag <0.10 2022 08:16 AM    Hepatitis B surface Ab <3.10 2022 08:16 AM    Hepatitis B core, IgM NONREACTIVE 2018 04:53 PM    Hep B Core Ab, total Negative 2022 08:16 AM       18:       Labs at [facility]:  22: CA 19-9 = 390  22: CEA = 12.3  22: CEA = 19.2    Imagin/9/18 Abd/pelvis CT: IMPRESSION:  1. Interval development of a large retroperitoneal mass encircling the aorta with invasion of the left renal hilum and left adrenal gland. Several adjacent lymph nodes are seen extending into the peritoneum and underneath the  diaphragmatic natalie. This most likely represents lymphoma. 2. Several new bilateral enlarged inguinal lymph nodes also likely representing lymphoma. 3. Moderate left hydronephrosis with a delayed renal nephrogram related to decreased renal function. This is related to the invasion of the renal hilum. 18 Chest CT: IMPRESSION:  Trace left pleural effusion. Bilateral lower lobe atelectasis.  Large  retroperitoneal mass lesion again demonstrated. PET 12/18/18:  FINDINGS:  HEAD/NECK: Right palatine tonsil intense hypermetabolism, max SUV 18. Multilevel  bilateral cervical adenopathy, with max SUV 12 in a left supraclavicular node. Cerebral evaluation is limited by normal intense activity. CHEST: Solitary hypermetabolic left axillary node, max SUV 11. ABDOMEN/PELVIS: Bulky retroperitoneal mass max SUV 27, with several additional  small active abdomino-pelvic nodes. Bilateral inguinal nodes with max SUV 12 on  the left. SKELETON: No foci of abnormal hypermetabolism in the axial and visualized  appendicular skeleton. IMPRESSION:   1. Right palatine tonsil tumor involvement (Deauville 5). 2. Bilateral cervical delaney involvement (Deauville 5). 3. Left axillary node involvement (Deauville 5). 4. Bulky retroperitoneal lymphoma mass and additional smaller hypermetabolic  abdomino-pelvic nodes (Deauville 5). 5. Bilateral inguinal delaney involvement (Deauville 5). Deauville Five Point Scale  1. No uptake or no residual uptake (when used interim)  2. Slight uptake, but below blood pool (mediastinum)  3. Uptake above mediastinal, but below/equal to uptake in the liver  4. Uptake slightly to moderately higher than liver  5. Markedly increased uptake or any new lesion (on response evaluation)  Each FDG-avid (or previously FDG avid) lesion is rated independently. Reference values:  Mediastinal blood pool: 2.1 SUV  Liver (background): 2.2 SUV    PET/CT 2/05/19:   IMPRESSION:  1. No Foci of Abnormal Hypermetabolism (Deauville 1). 2. Resolved activity in the right palatine tonsil, bilateral cervical nodes,left axillary node, retroperitoneal/abdominal pelvic adenopathy, bilateral inguinal nodes. Echo 2/14/19:  Normal cavity size, wall thickness and systolic function (ejection fraction normal). The muscle mass is normal. The cavity shape is normal. The estimated ejection fraction is 41 - 45%.  Abnormal wall motion as described on the wall scoring diagram below. End-systolic volume is normal. Normal left ventricular strain. There is mild (grade 1) left ventricular diastolic dysfunction. Normal left ventricular diastolic pressure. End-diastolic volume is normal.    LE arterial duplex 2/22/19:  There is evidence of left groin pseudoaneurysm noted arising from distal common femoral artery, pseudo lobe measures 2.32cm x 2.58cm and pseudo neck length measuring 0.63cm. There is no evidence of hemodynamically significant left lower extremity arterial obstruction. JACLYN is 1.03 on the right and 1.02 on the left. LE arterial duplex s/p Thrombin Injection to Pseudoaneurysm 2/26/19:  Successful thrombin injection procedure of the left groin with no further flow seen. No evidence of hemodnyamically significant obstruction in the left lower extremity. Left lower extremity arterial duplex performed. Confirmed pseudoaneurysm in left groin with small neck. Following thrombin injection, no further flow seen in the pseudoaneurysm. The left common femoral, profunda femoral, femoral, popliteal, posterior tibial and anterior arteries were imaged. Mainly triphasic flow was seen with no evidence of significantly elevated velocities. Repeat LE arterial duplex 2/27/19:  Continued thrombosed left groin pseudoaneurysm following thrombin injection on 02/26/2019. No flow or color fill is identified. The hematoma measures approximately 2.1 x 2.9 cm in diameter. The common femoral, deep femoral, femoral, and popliteal arteries are patent with mainly tri-phasic flow and no significant hemodynamically obstruction is noted. Stress 5/31/19:  · Normal stress myocardial perfusion without ischemia or infact at 84% MPHR. Normal LV function. LVEF 60%. · No EKG changes of ischemia at peak exercise. · Normal functional capacity. PET 6/03/19: IMPRESSION: No Foci of Abnormal Hypermetabolism (Deauville 1).     -MRI lumbar spine 12/18/19:  Mild disc degenerative change at L3-4 and L4-5. Mild canal stenosis at L3-4 and mild left foraminal stenosis at L4-5. Other less severe degenerative findings are as described above. Continued diminished size of retroperitoneal mass-adenopathy,  with diminished soft tissue density at the left renal hilum. -CT chest/abdomen 12/16/19:  Findings are consistent with interval response to therapy    CT c/a/p 5/28/20: IMPRESSION:  Further contraction of the retroperitoneal mantle and chris mesentery,  compatible with treatment response  Stable left hilar soft tissue mass  Slight increased splaying of the duodenum and proximal jejunum is the result, without obstruction  No evidence for recurrence of lymphoma in the chest, abdomen, or pelvis    CT c/a/p 2/13/22:  FINDINGS:   LOWER THORAX: Dependent atelectasis with otherwise clear lungs. The visualized  heart is normal in size without pericardial effusion. LIVER: No mass. BILIARY TREE: Gallbladder is unremarkable. CBD is not dilated. SPLEEN: Unremarkable. PANCREAS: No mass or ductal dilatation. ADRENALS: Unremarkable. KIDNEYS: Atrophic left kidney with mild left hydronephrosis. Right kidney is  unremarkable with no stone, enhancing mass, or other renal abnormality. STOMACH: Unremarkable. SMALL BOWEL: No dilatation or wall thickening. COLON: Circumferential wall thickening at the hepatic flexure with luminal  narrowing, adjacent mesenteric stranding, and fluid with upstream retention in  the cecum and fecalization of distal ileal contents. No dilation or other wall  thickening. APPENDIX: Unremarkable. PERITONEUM: Moderate free fluid with no pneumoperitoneum. RETROPERITONEUM: Soft tissue stranding around the celiac and SMA and associated aorta with no discrete mass or adenopathy. Aorta is normal in size without aneurysm or dissection. REPRODUCTIVE ORGANS: Prostate and seminal vesicles appear unremarkable. URINARY BLADDER: No mass or calculus. BONES: No destructive bone lesion.   ABDOMINAL WALL: No mass or hernia. ADDITIONAL COMMENTS: N/A  IMPRESSION  1. Annular mass lesion of the right colon with upstream fecal retention  concerning for primary colon neoplasm versus less likely lymphoma. 2. Soft tissue stranding around celiac axis, SMA, and abdominal aorta may  reflect infiltrative lymphoma. 3. Left renal atrophy with left hydronephrosis likely reflecting chronic  proximal ureteral obstruction. 4. Incidentals as above. 2/24/22 CT abd/pelvis at [facility]:  FINDINGS: An enteric tube with sidehole beyond the level of the lower esophageal sphincter is seen looping on itself in the proximal stomach before terminating in the mid stomach. Status post right lower quadrant diverting ileostomy for an obstructing colonic mass. Multiple loops of gas dilated small bowel remain, with a maximal diameter of 5.4 cm whereas previously measured 3.6 cm. Dilute contrast is seen within the lateral left abdominal dilated small bowel. Moderate stool burden and bowel gas opacifies the cecum, measuring 7.3 cm in diameter.    No definite supine evidence of pneumoperitoneum. Lung bases: Limited evaluation of the lung bases demonstrates a cardiac silhouette within normal limits for size. A small bore central venous catheter is seen terminating in the right atrium. No focal consolidation or pleural effusion. 2/24/22 CT abd/pelvis [facility]:  IMPRESSION:  1. Status post right lower quadrant loop ileostomy. Mild diffuse dilation of small bowel proximal to the ostomy in the lower abdomen and upper pelvis concerning for at least mild to moderate partial bowel obstruction. Relatively decompressed loop ileostomy. 2. Mildly complex pelvic fluid collection in the rectovesical space, decreased in size from prior. 3. Redemonstrated ascending colon mass and findings suspicious for regional metastatic disease. 4. Redemonstrated soft tissue in the retroperitoneum with prominent lymph nodes, similar to prior examination.  Differential would include metastasis, retroperitoneal fibrosis. 5. Mild atherosclerosis. 6. Subcentimeter right renal hypodensity, too small to characterize. 7. Severe compression of the left renal vein, similar to prior examination. 8. Additional findings as described above. Bones: No acute osseous abnormality. 2/24/22 CT chest [facility]:  IMPRESSION:  FINAL report. 1. No evidence of metastatic disease to the chest.  2. No enlarged lymph nodes. 3. Increasing volume loss in the lung bases which may be due to atelectasis. 4. CT abdomen and pelvis reported separately.     2/25/22 Colonoscopy at [facility]:  Impression:            - Preparation of the colon was inadequate.                        - Stool in the entire examined colon.                        - Likely malignant completely obstructing tumor at the                         hepatic flexure. Biopsied.                        - Malignant-appearing tumor in the colon. Complications:         No immediate complications. Assessment & Plan:   Jorge A Hancock is a 42 y.o. male comes in for evaluation and management of lymphoma. 1. Undifferentiated carcinoma of transverse colon, metastatic, KRAS/NRAS wild type**:  S/p diverting ileostomy due to large bowel obstruction. Colonoscopy with biopsy on 2/25/22. No obvious metastatic disease on CT imaging, but did have obvious metastatic disease to peritoneum during laparoscopy with Dr. Octaviano Melgoza. I recommended FOLFOXIRI every 2 weeks. Will not give Bevacizumab given h/o MRI and tobacco use. Supportive medications: zofran, compazine, dexamethasone, EMLA topical  -- Proceed with C2 of FOLFOXIRI with pump removal on Wednesday. -- Signatera testing 5/16/22 - results pending  -- Check CEA every 8 weeks  -- Consider repeat colonoscopy in 6 months given incomplete colonoscopy. -- Repeat CT of ch/abd/pelvis after C4  -- Follow up in 2 weeks C3 FOLFOXIRI, MD/NP visit.      2. H/o Follicular lymphoma: Grade 3a with with bulky disease encircling the aorta, causing pain. Bone marrow negative for lymphoma, but was hypercellular. BR better than RCHOP, but based on GALLIUM study, Obinutuzumab-based induction and maintenance prolongs PFS over that seen with rituximab-based therapy. Therefore, pt started on O-CHOP regimen. He received 2 cycles with CR and was then switched to BR x 4 cycles given STEMI. Completed treatment 5/2019. PET completed 6/3/19 showed CR. CT 5/28/20 negative for disease. No extra surveillance needed at this time given metastatic colon cancer with frequent imaging. 3. Chronic lumbar back pain/anxiety/RLQ: Left lower back pain is chronic/stable and RLQ is likely related to neoplasm/colostomy - currently managing pain on Oxycodone and Lexapro daily. Signed pain contract on 12/28/18 and following with Dr. Clare Villasenor. 4. CAD: h/o STEMI 2/14/29 with TOM placed to proximal LAD. Following with Dr. Cat Jay and remains on dual antiplatelet therapy, high dose Lipitor, Metoprolol, ACEI. Received only 2 doses of cardiotoxic chemo, Doxorubicin in early 1/2019. Is overdue for follow up with Dr. Cat Jay. 5. Tobacco abuse: Discussed benefits of quitting smoking again. Previously discussed replacing hand-to-mouth habit with another item such as Twizzlers or SlimJims. 6. HTN: Well controlled on lisinopril. Managed by PCP. 7. BRCA2 mutation:  Will discuss with patient in future. He would benefit from genetic counseling for himself and his family. 8. Weight-loss / nausea:   Encouraged supplementing with 2-3 boost/ensures daily in addition to meals and 60 oz water daily. Encouraged oral hydration. Also advised scheduled antiemetics on days 4-6. Goals of care: Disease is not curable, but is treatable to improve quality and duration of life. I personally provided the service today.      Signed By: Job Cordoba MD     May 31, 2022

## 2022-05-25 NOTE — PROGRESS NOTES
Palliative Medicine Office Visit  Palliative Medicine Nurse Check In  [phone]    Patient Name: Siri Tena. YOB: 1975      Date of Office Visit: 5/25/2022    Patient states: \"  \"    From Check In Sheet (scanned in Media):  Has a medical provider talked with you about cardiopulmonary resuscitation (CPR)? [x] Yes   [] No   [] Unable to obtain    Nurse reminder to complete or update ACP FlowSheet:    Is ACP on the Problem List?    [] Yes    [x] No  IF ACP Document is ON FILE; Nurse to place ACP on Problem List     Is there an ACP Note in Chart Review/Note? [x] Yes    [] No   If NO: ALERT PROVIDER          Primary Decision Maker: Artur Lynch Girlfrienangelique - [phone]  Advance Care Planning 5/25/2022   Patient's Healthcare Decision Maker is: Legal Next of Kin   Confirm Advance Directive None   Patient Would Like to Complete Advance Directive -   Does the patient have other document types -       Is there anything that we should know about you as a person in order to provide you the best care possible? Have you been to the ER, urgent care clinic since your last visit? [] Yes   [x] No   [] Unable to obtain    Have you been hospitalized since your last visit? [] Yes   [x] No   [] Unable to obtain    Have you seen or consulted any other health care providers outside of the [address] [place] [address] since your last visit?    [] Yes   [x] No   [] Unable to obtain    Functional status (describe):         Last BM:  Colostomy bag      accessed (date): 5/25/2022    Bottle review (for opioid pain medication):  Medication 1:   Date filled:   Directions:   # filled:   # left:   # pills taking per day:  Last dose taken:    Medication 2:   Date filled:   Directions:   # filled:   # left:   # pills taking per day:  Last dose taken:    Medication 3:   Date filled:   Directions:   # filled:   # left:   # pills taking per day:  Last dose taken:    Medication 4:   Date filled: Directions:   # filled:   # left:   # pills taking per day:  Last dose taken:

## 2022-05-27 ENCOUNTER — TELEPHONE (OUTPATIENT)
Dept: PALLATIVE CARE | Age: 47
End: 2022-05-27

## 2022-05-27 DIAGNOSIS — C18.3 MALIGNANT NEOPLASM OF HEPATIC FLEXURE (HCC): ICD-10-CM

## 2022-05-27 DIAGNOSIS — R10.84 ABDOMINAL PAIN, GENERALIZED: ICD-10-CM

## 2022-05-27 DIAGNOSIS — C18.9 COLON ADENOCARCINOMA (HCC): ICD-10-CM

## 2022-05-27 RX ORDER — OXYCODONE HYDROCHLORIDE 10 MG/1
10 TABLET ORAL
Qty: 90 TABLET | Refills: 0 | Status: SHIPPED | OUTPATIENT
Start: 2022-05-27 | End: 2022-06-11

## 2022-05-27 NOTE — TELEPHONE ENCOUNTER
The patient states his refill for Oxycodone 10 mg 15 day supply  is due tomorrow. He would like to pick it up at that time.  To be called in to [facility].

## 2022-05-27 NOTE — TELEPHONE ENCOUNTER
Triage for Controlled Substance Refill Request    Pain Diagnosis: _Abdominal pain, generalized (R10.84); Colon adenocarcinoma ([facility] [address] (C18.9); Malignant neoplasm of hepatic flexure (HCC) (C18.3)    Last Outpatient Visit: _5/25/2022    Next Outpatient Visit: _none scheduled    Reason for refill needed outside of office visit? Appointment not scheduled prior to need for scheduled refill      Pharmacy: _[facility]pharm[facility] #[id number]- [place], [address] [place] [address]    Medication:oxyCODONE IR (ROXICODONE) 10 mg tab immediate release tablet       Dose and directions: Take 1 Tablet by mouth every four (4) hours as needed for Pain for up to 15 days.    Number dispensed:90  Date filled ( or Pharmacy):5/13/2022  # left: 1 day       reviewed: _yes    Date of Urine Drug Screen:  _    Opioid Safety Handout given:  _yes    Appropriate for refill:  _yes    Action:  _ Medication pending

## 2022-05-31 ENCOUNTER — HOSPITAL ENCOUNTER (OUTPATIENT)
Dept: INFUSION THERAPY | Age: 47
Discharge: HOME | End: 2022-05-31
Payer: MEDICAID

## 2022-05-31 ENCOUNTER — OFFICE VISIT (OUTPATIENT)
Dept: ONCOLOGY | Age: 47
End: 2022-05-31
Payer: COMMERCIAL

## 2022-05-31 VITALS
RESPIRATION RATE: 16 BRPM | BODY MASS INDEX: 20.67 KG/M2 | DIASTOLIC BLOOD PRESSURE: 70 MMHG | SYSTOLIC BLOOD PRESSURE: 111 MMHG | HEART RATE: 76 BPM | TEMPERATURE: 97.8 F | HEIGHT: 67 IN | WEIGHT: 131.7 LBS | OXYGEN SATURATION: 95 %

## 2022-05-31 VITALS
WEIGHT: 131.7 LBS | TEMPERATURE: 97.8 F | OXYGEN SATURATION: 95 % | HEART RATE: 92 BPM | HEIGHT: 67 IN | BODY MASS INDEX: 20.67 KG/M2 | SYSTOLIC BLOOD PRESSURE: 108 MMHG | DIASTOLIC BLOOD PRESSURE: 84 MMHG

## 2022-05-31 DIAGNOSIS — T45.1X5A CHEMOTHERAPY-INDUCED NAUSEA: ICD-10-CM

## 2022-05-31 DIAGNOSIS — C18.9 COLON ADENOCARCINOMA (HCC): Primary | ICD-10-CM

## 2022-05-31 DIAGNOSIS — Z15.09 BRCA2 GENE MUTATION POSITIVE IN MALE: ICD-10-CM

## 2022-05-31 DIAGNOSIS — C82.90 FOLLICULAR LYMPHOMA, UNSPECIFIED FOLLICULAR LYMPHOMA TYPE, UNSPECIFIED BODY REGION (HCC): ICD-10-CM

## 2022-05-31 DIAGNOSIS — R11.0 CHEMOTHERAPY-INDUCED NAUSEA: ICD-10-CM

## 2022-05-31 DIAGNOSIS — C18.4 CARCINOMA OF TRANSVERSE COLON (HCC): Primary | ICD-10-CM

## 2022-05-31 DIAGNOSIS — Z15.01 BRCA2 GENE MUTATION POSITIVE IN MALE: ICD-10-CM

## 2022-05-31 DIAGNOSIS — Z51.11 CHEMOTHERAPY MANAGEMENT, ENCOUNTER FOR: ICD-10-CM

## 2022-05-31 DIAGNOSIS — R10.31 RLQ ABDOMINAL PAIN: ICD-10-CM

## 2022-05-31 DIAGNOSIS — Z15.03 BRCA2 GENE MUTATION POSITIVE IN MALE: ICD-10-CM

## 2022-05-31 LAB
ALBUMIN SERPL-MCNC: 2.4 G/DL (ref 3.5–5)
ALBUMIN/GLOB SERPL: 0.5 {RATIO} (ref 1.1–2.2)
ALP SERPL-CCNC: 130 U/L (ref 45–117)
ALT SERPL-CCNC: 35 U/L (ref 12–78)
ANION GAP SERPL CALC-SCNC: 7 MMOL/L (ref 5–15)
AST SERPL-CCNC: 26 U/L (ref 15–37)
BASO+EOS+MONOS # BLD AUTO: 1.1 K/UL (ref 0.2–1.2)
BASO+EOS+MONOS NFR BLD AUTO: 19 % (ref 3.2–16.9)
BILIRUB SERPL-MCNC: 0.4 MG/DL (ref 0.2–1)
BUN SERPL-MCNC: 9 MG/DL (ref 6–20)
BUN/CREAT SERPL: 9 (ref 12–20)
CALCIUM SERPL-MCNC: 9.2 MG/DL (ref 8.5–10.1)
CHLORIDE SERPL-SCNC: 104 MMOL/L (ref 97–108)
CO2 SERPL-SCNC: 25 MMOL/L (ref 21–32)
CREAT SERPL-MCNC: 1 MG/DL (ref 0.7–1.3)
DIFFERENTIAL METHOD BLD: ABNORMAL
ERYTHROCYTE [DISTWIDTH] IN BLOOD BY AUTOMATED COUNT: 18.2 % (ref 11.8–15.8)
GLOBULIN SER CALC-MCNC: 4.6 G/DL (ref 2–4)
GLUCOSE SERPL-MCNC: 107 MG/DL (ref 65–100)
HCT VFR BLD AUTO: 35.7 % (ref 36.6–50.3)
HGB BLD-MCNC: 11.4 G/DL (ref 12.1–17)
LYMPHOCYTES # BLD: 1.5 K/UL (ref 0.8–3.5)
LYMPHOCYTES NFR BLD: 26 % (ref 12–49)
MCH RBC QN AUTO: 25 PG (ref 26–34)
MCHC RBC AUTO-ENTMCNC: 31.9 G/DL (ref 30–36.5)
MCV RBC AUTO: 78.3 FL (ref 80–99)
NEUTS SEG # BLD: 3.1 K/UL (ref 1.8–8)
NEUTS SEG NFR BLD: 55 % (ref 32–75)
PLATELET # BLD AUTO: 326 K/UL (ref 150–400)
POTASSIUM SERPL-SCNC: 3.7 MMOL/L (ref 3.5–5.1)
PROT SERPL-MCNC: 7 G/DL (ref 6.4–8.2)
RBC # BLD AUTO: 4.56 M/UL (ref 4.1–5.7)
SODIUM SERPL-SCNC: 136 MMOL/L (ref 136–145)
WBC # BLD AUTO: 5.7 K/UL (ref 4.1–11.1)

## 2022-05-31 PROCEDURE — 77030012965 HC NDL HUBR BBMI -A

## 2022-05-31 PROCEDURE — 74011250636 HC RX REV CODE- 250/636: Performed by: INTERNAL MEDICINE

## 2022-05-31 PROCEDURE — 85025 COMPLETE CBC W/AUTO DIFF WBC: CPT

## 2022-05-31 PROCEDURE — 99215 OFFICE O/P EST HI 40 MIN: CPT | Performed by: INTERNAL MEDICINE

## 2022-05-31 PROCEDURE — 96417 CHEMO IV INFUS EACH ADDL SEQ: CPT

## 2022-05-31 PROCEDURE — 96415 CHEMO IV INFUSION ADDL HR: CPT

## 2022-05-31 PROCEDURE — 96413 CHEMO IV INFUSION 1 HR: CPT

## 2022-05-31 PROCEDURE — 96375 TX/PRO/DX INJ NEW DRUG ADDON: CPT

## 2022-05-31 PROCEDURE — 74011000258 HC RX REV CODE- 258: Performed by: INTERNAL MEDICINE

## 2022-05-31 PROCEDURE — 36415 COLL VENOUS BLD VENIPUNCTURE: CPT

## 2022-05-31 PROCEDURE — 80053 COMPREHEN METABOLIC PANEL: CPT

## 2022-05-31 PROCEDURE — 96416 CHEMO PROLONG INFUSE W/PUMP: CPT

## 2022-05-31 RX ORDER — PALONOSETRON 0.05 MG/ML
0.25 INJECTION, SOLUTION INTRAVENOUS ONCE
Status: COMPLETED | OUTPATIENT
Start: 2022-05-31 | End: 2022-05-31

## 2022-05-31 RX ORDER — SODIUM CHLORIDE 9 MG/ML
10 INJECTION INTRAMUSCULAR; INTRAVENOUS; SUBCUTANEOUS AS NEEDED
Status: ACTIVE | OUTPATIENT
Start: 2022-05-31 | End: 2022-05-31

## 2022-05-31 RX ORDER — DEXTROSE MONOHYDRATE 50 MG/ML
25 INJECTION, SOLUTION INTRAVENOUS CONTINUOUS
Status: DISPENSED | OUTPATIENT
Start: 2022-05-31 | End: 2022-05-31

## 2022-05-31 RX ORDER — HEPARIN 100 UNIT/ML
300-500 SYRINGE INTRAVENOUS AS NEEDED
Status: ACTIVE | OUTPATIENT
Start: 2022-05-31 | End: 2022-05-31

## 2022-05-31 RX ORDER — SODIUM CHLORIDE 9 MG/ML
25 INJECTION, SOLUTION INTRAVENOUS CONTINUOUS
Status: DISCONTINUED | OUTPATIENT
Start: 2022-05-31 | End: 2022-06-02 | Stop reason: HOSPADM

## 2022-05-31 RX ORDER — SODIUM CHLORIDE 0.9 % (FLUSH) 0.9 %
10 SYRINGE (ML) INJECTION AS NEEDED
Status: DISPENSED | OUTPATIENT
Start: 2022-05-31 | End: 2022-05-31

## 2022-05-31 RX ADMIN — FLUOROURACIL 4080 MG: 50 INJECTION, SOLUTION INTRAVENOUS at 14:36

## 2022-05-31 RX ADMIN — PALONOSETRON 0.25 MG: 0.05 INJECTION, SOLUTION INTRAVENOUS at 09:38

## 2022-05-31 RX ADMIN — IRINOTECAN HYDROCHLORIDE 281 MG: 20 INJECTION, SOLUTION INTRAVENOUS at 10:34

## 2022-05-31 RX ADMIN — LEUCOVORIN CALCIUM 680 MG: 200 INJECTION, POWDER, LYOPHILIZED, FOR SOLUTION INTRAMUSCULAR; INTRAVENOUS at 12:16

## 2022-05-31 RX ADMIN — DEXTROSE MONOHYDRATE 25 ML/HR: 50 INJECTION, SOLUTION INTRAVENOUS at 09:33

## 2022-05-31 RX ADMIN — DEXAMETHASONE SODIUM PHOSPHATE 12 MG: 4 INJECTION, SOLUTION INTRA-ARTICULAR; INTRALESIONAL; INTRAMUSCULAR; INTRAVENOUS; SOFT TISSUE at 09:40

## 2022-05-31 RX ADMIN — OXALIPLATIN 144.5 MG: 5 INJECTION, SOLUTION INTRAVENOUS at 12:16

## 2022-05-31 NOTE — PROGRESS NOTES
Prem Flores is a 42 y.o. male here for follow up of colon cancer. Patient with complaints of abdominal pain, rates as a 5 out of 10.

## 2022-05-31 NOTE — PROGRESS NOTES
[facility] VISIT NOTE    0750. Pt arrived at [facility] ambulatory and in no distress for C2 Folfoxiri. Assessment completed by Pamela Arshad RN, pt c/o feeling tired. RCW chest port accessed by Pamela Arshad RN with . 75 in joshi with no difficulty. Positive blood return noted and labs drawn. Labs resulted and are within parameters to treat. Medications received:  D5 KVO  Aloxi IV  Dexamethasone IV  Irinotecan IV  Oxaliplatin IV  Leucovorin IV  Fluorouracil CIV cadd cassette    Tolerated treatment well, no adverse reaction noted. Port attached to 5fu pump and verified infusing. Positive blood return noted. Patient Vitals for the past 12 hrs:   Temp Pulse Resp BP SpO2   05/31/22 0751 97.8 °F (36.6 °C) 92 16 108/84 95 %     Recent Results (from the past 12 hour(s))   CBC WITH 3 PART DIFF    Collection Time: 05/31/22  7:56 AM   Result Value Ref Range    WBC 5.7 4.1 - 11.1 K/uL    RBC 4.56 4.10 - 5.70 M/uL    HGB 11.4 (L) 12.1 - 17.0 g/dL    HCT 35.7 (L) 36.6 - 50.3 %    MCV 78.3 (L) 80.0 - 99.0 FL    MCH 25.0 (L) 26.0 - 34.0 PG    MCHC 31.9 30.0 - 36.5 g/dL    RDW 18.2 (H) 11.8 - 15.8 %    PLATELET 870 058 - 641 K/uL    NEUTROPHILS 55 32 - 75 %    Mixed cells 19 (H) 3.2 - 16.9 %    LYMPHOCYTES 26 12 - 49 %    ABS. NEUTROPHILS 3.1 1.8 - 8.0 K/UL    ABS. MIXED CELLS 1.1 0.2 - 1.2 K/uL    ABS.  LYMPHOCYTES 1.5 0.8 - 3.5 K/UL    DF AUTOMATED     METABOLIC PANEL, COMPREHENSIVE    Collection Time: 05/31/22  7:56 AM   Result Value Ref Range    Sodium 136 136 - 145 mmol/L    Potassium 3.7 3.5 - 5.1 mmol/L    Chloride 104 97 - 108 mmol/L    CO2 25 21 - 32 mmol/L    Anion gap 7 5 - 15 mmol/L    Glucose 107 (H) 65 - 100 mg/dL    BUN 9 6 - 20 MG/DL    Creatinine 1.00 0.70 - 1.30 MG/DL    BUN/Creatinine ratio 9 (L) 12 - 20      GFR est AA >60 >60 ml/min/1.73m2    GFR est non-AA >60 >60 ml/min/1.73m2    Calcium 9.2 8.5 - 10.1 MG/DL    Bilirubin, total 0.4 0.2 - 1.0 MG/DL    ALT (SGPT) 35 12 - 78 U/L    AST (SGOT) 26 15 - 37 U/L Alk. phosphatase 130 (H) 45 - 117 U/L    Protein, total 7.0 6.4 - 8.2 g/dL    Albumin 2.4 (L) 3.5 - 5.0 g/dL    Globulin 4.6 (H) 2.0 - 4.0 g/dL    A-G Ratio 0.5 (L) 1.1 - 2.2       1445. D/C'd from [facility] ambulatory and in no distress.  Next appointment is 6/2/22 at 2:30 pm.

## 2022-06-01 ENCOUNTER — TELEPHONE (OUTPATIENT)
Dept: ONCOLOGY | Age: 47
End: 2022-06-01

## 2022-06-01 NOTE — TELEPHONE ENCOUNTER
[address] at [facility]  [phone]        06/01/22 1:15 PM Per Dr. Daniela Valenzuela, submitted oncology tumor/molecular diagnostics panel to [facility] for further interpretation. Will continue to monitor. 06/03/22 10:45 AM Clarified Select report available. Copy to be scanned to patient's chart and placed on NP desk for review.

## 2022-06-02 ENCOUNTER — HOSPITAL ENCOUNTER (OUTPATIENT)
Dept: INFUSION THERAPY | Age: 47
Discharge: HOME | End: 2022-06-02
Payer: MEDICAID

## 2022-06-02 VITALS
SYSTOLIC BLOOD PRESSURE: 111 MMHG | TEMPERATURE: 98.3 F | RESPIRATION RATE: 16 BRPM | OXYGEN SATURATION: 99 % | DIASTOLIC BLOOD PRESSURE: 74 MMHG | HEART RATE: 71 BPM

## 2022-06-02 DIAGNOSIS — C18.9 COLON ADENOCARCINOMA (HCC): Primary | ICD-10-CM

## 2022-06-02 DIAGNOSIS — C82.90 FOLLICULAR LYMPHOMA, UNSPECIFIED FOLLICULAR LYMPHOMA TYPE, UNSPECIFIED BODY REGION (HCC): ICD-10-CM

## 2022-06-02 PROCEDURE — 74011250636 HC RX REV CODE- 250/636: Performed by: INTERNAL MEDICINE

## 2022-06-02 PROCEDURE — 96523 IRRIG DRUG DELIVERY DEVICE: CPT

## 2022-06-02 PROCEDURE — 74011000250 HC RX REV CODE- 250: Performed by: INTERNAL MEDICINE

## 2022-06-02 RX ORDER — SODIUM CHLORIDE 9 MG/ML
10 INJECTION INTRAMUSCULAR; INTRAVENOUS; SUBCUTANEOUS AS NEEDED
Status: DISCONTINUED | OUTPATIENT
Start: 2022-06-02 | End: 2022-06-03 | Stop reason: HOSPADM

## 2022-06-02 RX ORDER — SODIUM CHLORIDE 0.9 % (FLUSH) 0.9 %
10 SYRINGE (ML) INJECTION AS NEEDED
Status: DISCONTINUED | OUTPATIENT
Start: 2022-06-02 | End: 2022-06-03 | Stop reason: HOSPADM

## 2022-06-02 RX ORDER — HEPARIN 100 UNIT/ML
300-500 SYRINGE INTRAVENOUS AS NEEDED
Status: DISCONTINUED | OUTPATIENT
Start: 2022-06-02 | End: 2022-06-03 | Stop reason: HOSPADM

## 2022-06-02 RX ADMIN — Medication 10 ML: at 14:45

## 2022-06-02 RX ADMIN — HEPARIN 500 UNITS: 100 SYRINGE at 14:45

## 2022-06-03 RX ORDER — PALONOSETRON 0.05 MG/ML
0.25 INJECTION, SOLUTION INTRAVENOUS ONCE
Status: CANCELLED | OUTPATIENT
Start: 2022-06-13 | End: 2022-06-13

## 2022-06-03 RX ORDER — DIPHENHYDRAMINE HYDROCHLORIDE 50 MG/ML
25 INJECTION, SOLUTION INTRAMUSCULAR; INTRAVENOUS AS NEEDED
Status: CANCELLED
Start: 2022-06-13

## 2022-06-03 RX ORDER — ACETAMINOPHEN 325 MG/1
650 TABLET ORAL AS NEEDED
Status: CANCELLED
Start: 2022-06-13

## 2022-06-03 RX ORDER — HYDROCORTISONE SODIUM SUCCINATE 100 MG/2ML
100 INJECTION, POWDER, FOR SOLUTION INTRAMUSCULAR; INTRAVENOUS AS NEEDED
Status: CANCELLED | OUTPATIENT
Start: 2022-06-13

## 2022-06-03 RX ORDER — DEXTROSE MONOHYDRATE 50 MG/ML
25 INJECTION, SOLUTION INTRAVENOUS CONTINUOUS
Status: CANCELLED
Start: 2022-06-13

## 2022-06-03 RX ORDER — SODIUM CHLORIDE 9 MG/ML
25 INJECTION, SOLUTION INTRAVENOUS CONTINUOUS
Status: CANCELLED
Start: 2022-06-13

## 2022-06-03 RX ORDER — SODIUM CHLORIDE 9 MG/ML
10 INJECTION INTRAMUSCULAR; INTRAVENOUS; SUBCUTANEOUS AS NEEDED
Status: CANCELLED | OUTPATIENT
Start: 2022-06-22

## 2022-06-03 RX ORDER — EPINEPHRINE 1 MG/ML
0.3 INJECTION, SOLUTION, CONCENTRATE INTRAVENOUS AS NEEDED
Status: CANCELLED | OUTPATIENT
Start: 2022-06-13

## 2022-06-03 RX ORDER — ATROPINE SULFATE 0.4 MG/ML
0.4 INJECTION, SOLUTION ENDOTRACHEAL; INTRAMEDULLARY; INTRAMUSCULAR; INTRAVENOUS; SUBCUTANEOUS
Status: CANCELLED | OUTPATIENT
Start: 2022-06-13

## 2022-06-03 RX ORDER — DIPHENHYDRAMINE HYDROCHLORIDE 50 MG/ML
50 INJECTION, SOLUTION INTRAMUSCULAR; INTRAVENOUS AS NEEDED
Status: CANCELLED
Start: 2022-06-13

## 2022-06-03 RX ORDER — SODIUM CHLORIDE 0.9 % (FLUSH) 0.9 %
10 SYRINGE (ML) INJECTION AS NEEDED
Status: CANCELLED | OUTPATIENT
Start: 2022-06-22

## 2022-06-03 RX ORDER — ONDANSETRON 2 MG/ML
8 INJECTION INTRAMUSCULAR; INTRAVENOUS AS NEEDED
Status: CANCELLED | OUTPATIENT
Start: 2022-06-13

## 2022-06-03 RX ORDER — HEPARIN 100 UNIT/ML
300-500 SYRINGE INTRAVENOUS AS NEEDED
Status: CANCELLED
Start: 2022-06-22

## 2022-06-03 RX ORDER — ALBUTEROL SULFATE 0.83 MG/ML
2.5 SOLUTION RESPIRATORY (INHALATION) AS NEEDED
Status: CANCELLED
Start: 2022-06-13

## 2022-06-03 NOTE — PROGRESS NOTES
[id number] [facility] at [address] [place] [address]  [phone]    Hematology / Oncology Established Visit    Reason for Visit:   Humera Davey is a 42 y.o. male who is seen for urgent follow up of colon cancer, h/o lymphoma. Hematology Oncology Treatment History:     Diagnosis #1: Follicular lymphoma  Stage: IV  Pathology:   11/13/18 right inguinal LN excision: Follicular lymphoma, high-grade (grade 3a of 3). Prior Treatment:   1. Obinutuzumab-CHOP. Obinutuzumab: 1000 mg weekly on days 1, 8, 15 for cycle 1, then 1000 mg on day 1 q21 days for cycles 2-6, then monotherapy 1000 mg every 21 days for cycle 7, 8 with Cyclophosphamide 750mg/m2, Doxorubicin 50mg/m2, Vincristine 1.4mg/m2 on day 1 and Prednisone 100mg on Days 1-5, every 21 days for a total of 2 cycles completed late 1/2019. Regimen discontinued due to STEMI. 2. Obinutuzumab + Bendamustine: 1000 mg Obinutuzumab on day 1 + Bendamustine 90mg/m2 on days 1-2 on a 28-day cycle x 4 cycles, completed 5/2019  Current Treatment: Surveillance      Diagnosis #2: Colon cancer:  Stage: pending  Pathology:  Ascending colon; biopsy: poorly differentiated carcinoma  Comment: No dysplasia is identified. Immunohistochemical stains performed on block 1A, show the tumor positive for Cam5.2 and shows patchy positivity for CDX2 with a Ki-67 index of -90%; the tumor is focally positive for CK5/6 and GATA3; negative for p63, Pax8, CK7, CK20, panmelanoma, synaptophysin and chromogranin. The immunophenotypic features are nonspecific but argues somewhat against the following carcinoma: urothelial, neuroendocrine and breast. The immunophenotypic features also argues against melanoma and somewhat against classic colorectal carcinoma. Additional immunohistochemical stains to exclude the possibility of prostate and hepatocellular carcinoma have been   ordered; the results will be reported as an addendum.   MLH1/MSH2/MSH6/PMS2 all intact with no loss of nuclear expression of MMR proteins - no MSI   Addendum: The addendum is issued to report the results of additional immunohistochemical stains in an attempt to ascertain the primary site of the carcinoma. The diagnosis is unchanged. Hepar and glypican 3 immunohistochemical stains are neg, arguing against hepatocellular carcinoma. PSA stain is negative, arguing against prostatic primary. Imaging studies to eval for poss primary sites maybe useful. In the absence of carcinoma/tumor at any other sites, this may represent an undifferentiated carcinoma of the colon. Oncogenomics (molecular) studies: POLE< ARID1A, YVONNE, ATR, BRCA2, CHECK1, FANCI, NF1, PIK3CA, PTCH1, PTEN, RAD50, RAD51, RB1, SMARCA4, STK11    Prior Treatment: Loop ileostomy 2/19/22  Current Treatment: planned for R hemicolectomy, ileostomy reversal 4/27/22       Oncologic History:  Was in his usual state of health in early November 2018 when he developed low back pain and presented to the ER. Work-up at outside hospital revealed a retroperitoneal mass seen on CT imaging, and he was transferred to [facility] for further work-up. CT there showed a large retroperitoneal mass encircling the aorta with invasion of the left renal hilum and left adrenal gland. There were bilateral inguinal lymph nodes and moderate left hydronephrosis. He was evaluated while at [facility] and was noted to have palpable nodes in his groin for approximately the past 1 month. He underwent excisional LN biopsy of right inguinal LN, which revealed follicular lymphoma. At time of diagnosis, he had no cardiac disease aside from HTN, hyperlipidemia. However, he did have an NSTEMI after cycle 2 of O-CHOP. Was likely unrelated to chemotherapy, but opted to switch treatment to Obinutuzumab-Bendamustine. He completed treatment in 5/2019 and had a CR based on PET.     History of Present Illness:   Mr. Hue Wheatley is a 42 y.o. male with prior h/o follicular lymphoma is seen for follow up of colon cancer and C3 of treatment. Reports moderate fatigue. Not sleeping well at night - averaging 4 hours nightly. Tried trazodone which made it worse. Reports right lower abdominal pain managed with oxycontin 10mg and oxycodone every 4 hours. Managing with Dr. Yang Garcia. He denies any diarrhea, constipation, CP, SOB. He did have nausea without vomiting on days 3-7 and took antiemetics prn, which helped. PMHx: Lymphoma in 2018, CAD, HTN, Hyperlipidemia, Anxiety, chronic low back pain  PSurgHx: None aside from cardiac stent placement and port placement  SHx: Smokes 1/2ppd x past 20 yrs. Works part time as a cook. Has 2 children. FHx: Father had leukemia, passed away from pancreatic cancer. Review of Systems: A complete review of systems was obtained, negative except as described above. Physical Exam:     Visit Vitals  /78   Pulse 76   Temp 97.7 °F (36.5 °C)   Ht 5' 7\" (1.702 m)   Wt 129 lb 12.8 oz (58.9 kg)   SpO2 100%   BMI 20.33 kg/m²       ECOG PS: 0  General: no distress  Eyes: anicteric sclerae  HENT: oropharynx clear  Neck: supple  Lymphatic: no cervical, supraclavicular adenopathy  Respiratory: normal respiratory effort  CV: no peripheral edema, port upper chest   GI: soft, nontender, nondistended, no masses;  colostomy in place. Skin: no rashes; no ecchymoses; no petechiae      Results:     Lab Results   Component Value Date/Time    WBC 2.8 (L) 06/13/2022 07:55 AM    HGB 10.2 (L) 06/13/2022 07:55 AM    HCT 33.0 (L) 06/13/2022 07:55 AM    PLATELET 430 74/45/8856 07:55 AM    MCV 80.9 06/13/2022 07:55 AM    ABS.  NEUTROPHILS 0.9 (L) 06/13/2022 07:55 AM    Hemoglobin (POC) 15.0 06/05/2009 02:13 PM    Hematocrit (POC) 39 02/14/2019 01:24 PM     Lab Results   Component Value Date/Time    Sodium 139 06/13/2022 07:55 AM    Potassium 3.5 06/13/2022 07:55 AM    Chloride 108 06/13/2022 07:55 AM    CO2 23 06/13/2022 07:55 AM    Glucose 114 (H) 06/13/2022 07:55 AM    BUN 11 06/13/2022 07:55 AM    Creatinine 1.05 2022 07:55 AM    GFR est AA >60 2022 07:55 AM    GFR est non-AA >60 2022 07:55 AM    Calcium 9.4 2022 07:55 AM    Sodium (POC) 136 2019 01:24 PM    Potassium (POC) 3.9 2019 01:24 PM    Chloride (POC) 102 2019 01:24 PM    Glucose (POC) 249 (H) 02/15/2019 10:21 PM    BUN (POC) 14 2019 01:24 PM    Creatinine (POC) 0.9 2019 01:24 PM    Calcium, ionized (POC) 1.24 2019 01:24 PM     Lab Results   Component Value Date/Time    Bilirubin, total 0.3 2022 07:55 AM    ALT (SGPT) 25 2022 07:55 AM    Alk. phosphatase 108 2022 07:55 AM    Protein, total 6.7 2022 07:55 AM    Albumin 2.5 (L) 2022 07:55 AM    Globulin 4.2 (H) 2022 07:55 AM     Lab Results   Component Value Date/Time    Iron 18 (L) 2022 11:13 AM    TIBC 173 (L) 2022 11:13 AM    Iron % saturation 10 (L) 2022 11:13 AM    Ferritin 426 (H) 2022 11:13 AM       No results found for: B12LT, FOL, RBCF  Lab Results   Component Value Date/Time    TSH 1.53 2016 04:40 AM       Lab Results   Component Value Date/Time    Hepatitis A, IgM NONREACTIVE 2018 04:53 PM    Hepatitis B surface Ag <0.10 2022 08:16 AM    Hepatitis B surface Ab <3.10 2022 08:16 AM    Hepatitis B core, IgM NONREACTIVE 2018 04:53 PM    Hep B Core Ab, total Negative 2022 08:16 AM       18:       Labs at [facility]:  22: CA 19-9 = 390  22: CEA = 12.3  22: CEA = 19.2  6/13/22: CEA pending     Imagin/9/18 Abd/pelvis CT: IMPRESSION:  1. Interval development of a large retroperitoneal mass encircling the aorta with invasion of the left renal hilum and left adrenal gland. Several adjacent lymph nodes are seen extending into the peritoneum and underneath the  diaphragmatic natalie. This most likely represents lymphoma. 2. Several new bilateral enlarged inguinal lymph nodes also likely representing lymphoma.   3. Moderate left hydronephrosis with a delayed renal nephrogram related to decreased renal function. This is related to the invasion of the renal hilum. 11/11/18 Chest CT: IMPRESSION:  Trace left pleural effusion. Bilateral lower lobe atelectasis. Large  retroperitoneal mass lesion again demonstrated. PET 12/18/18:  FINDINGS:  HEAD/NECK: Right palatine tonsil intense hypermetabolism, max SUV 18. Multilevel  bilateral cervical adenopathy, with max SUV 12 in a left supraclavicular node. Cerebral evaluation is limited by normal intense activity. CHEST: Solitary hypermetabolic left axillary node, max SUV 11. ABDOMEN/PELVIS: Bulky retroperitoneal mass max SUV 27, with several additional  small active abdomino-pelvic nodes. Bilateral inguinal nodes with max SUV 12 on  the left. SKELETON: No foci of abnormal hypermetabolism in the axial and visualized  appendicular skeleton. IMPRESSION:   1. Right palatine tonsil tumor involvement (Deauville 5). 2. Bilateral cervical delaney involvement (Deauville 5). 3. Left axillary node involvement (Deauville 5). 4. Bulky retroperitoneal lymphoma mass and additional smaller hypermetabolic  abdomino-pelvic nodes (Deauville 5). 5. Bilateral inguinal delaney involvement (Deauville 5). Deauville Five Point Scale  1. No uptake or no residual uptake (when used interim)  2. Slight uptake, but below blood pool (mediastinum)  3. Uptake above mediastinal, but below/equal to uptake in the liver  4. Uptake slightly to moderately higher than liver  5. Markedly increased uptake or any new lesion (on response evaluation)  Each FDG-avid (or previously FDG avid) lesion is rated independently. Reference values:  Mediastinal blood pool: 2.1 SUV  Liver (background): 2.2 SUV    PET/CT 2/05/19:   IMPRESSION:  1. No Foci of Abnormal Hypermetabolism (Deauville 1).   2. Resolved activity in the right palatine tonsil, bilateral cervical nodes,left axillary node, retroperitoneal/abdominal pelvic adenopathy, bilateral inguinal nodes. Echo 2/14/19:  Normal cavity size, wall thickness and systolic function (ejection fraction normal). The muscle mass is normal. The cavity shape is normal. The estimated ejection fraction is 41 - 45%. Abnormal wall motion as described on the wall scoring diagram below. End-systolic volume is normal. Normal left ventricular strain. There is mild (grade 1) left ventricular diastolic dysfunction. Normal left ventricular diastolic pressure. End-diastolic volume is normal.    LE arterial duplex 2/22/19:  There is evidence of left groin pseudoaneurysm noted arising from distal common femoral artery, pseudo lobe measures 2.32cm x 2.58cm and pseudo neck length measuring 0.63cm. There is no evidence of hemodynamically significant left lower extremity arterial obstruction. JACLYN is 1.03 on the right and 1.02 on the left. LE arterial duplex s/p Thrombin Injection to Pseudoaneurysm 2/26/19:  Successful thrombin injection procedure of the left groin with no further flow seen. No evidence of hemodnyamically significant obstruction in the left lower extremity. Left lower extremity arterial duplex performed. Confirmed pseudoaneurysm in left groin with small neck. Following thrombin injection, no further flow seen in the pseudoaneurysm. The left common femoral, profunda femoral, femoral, popliteal, posterior tibial and anterior arteries were imaged. Mainly triphasic flow was seen with no evidence of significantly elevated velocities. Repeat LE arterial duplex 2/27/19:  Continued thrombosed left groin pseudoaneurysm following thrombin injection on 02/26/2019. No flow or color fill is identified. The hematoma measures approximately 2.1 x 2.9 cm in diameter. The common femoral, deep femoral, femoral, and popliteal arteries are patent with mainly tri-phasic flow and no significant hemodynamically obstruction is noted.     Stress 5/31/19:  · Normal stress myocardial perfusion without ischemia or infact at 84% MPHR. Normal LV function. LVEF 60%. · No EKG changes of ischemia at peak exercise. · Normal functional capacity. PET 6/03/19: IMPRESSION: No Foci of Abnormal Hypermetabolism (Deauville 1). -MRI lumbar spine 12/18/19:  Mild disc degenerative change at L3-4 and L4-5. Mild canal stenosis at L3-4 and mild left foraminal stenosis at L4-5. Other less severe degenerative findings are as described above. Continued diminished size of retroperitoneal mass-adenopathy,  with diminished soft tissue density at the left renal hilum. -CT chest/abdomen 12/16/19:  Findings are consistent with interval response to therapy    CT c/a/p 5/28/20: IMPRESSION:  Further contraction of the retroperitoneal mantle and chris mesentery,  compatible with treatment response  Stable left hilar soft tissue mass  Slight increased splaying of the duodenum and proximal jejunum is the result, without obstruction  No evidence for recurrence of lymphoma in the chest, abdomen, or pelvis    CT c/a/p 2/13/22:  FINDINGS:   LOWER THORAX: Dependent atelectasis with otherwise clear lungs. The visualized  heart is normal in size without pericardial effusion. LIVER: No mass. BILIARY TREE: Gallbladder is unremarkable. CBD is not dilated. SPLEEN: Unremarkable. PANCREAS: No mass or ductal dilatation. ADRENALS: Unremarkable. KIDNEYS: Atrophic left kidney with mild left hydronephrosis. Right kidney is  unremarkable with no stone, enhancing mass, or other renal abnormality. STOMACH: Unremarkable. SMALL BOWEL: No dilatation or wall thickening. COLON: Circumferential wall thickening at the hepatic flexure with luminal  narrowing, adjacent mesenteric stranding, and fluid with upstream retention in  the cecum and fecalization of distal ileal contents. No dilation or other wall  thickening. APPENDIX: Unremarkable. PERITONEUM: Moderate free fluid with no pneumoperitoneum.   RETROPERITONEUM: Soft tissue stranding around the celiac and SMA and associated aorta with no discrete mass or adenopathy. Aorta is normal in size without aneurysm or dissection. REPRODUCTIVE ORGANS: Prostate and seminal vesicles appear unremarkable. URINARY BLADDER: No mass or calculus. BONES: No destructive bone lesion. ABDOMINAL WALL: No mass or hernia. ADDITIONAL COMMENTS: N/A  IMPRESSION  1. Annular mass lesion of the right colon with upstream fecal retention  concerning for primary colon neoplasm versus less likely lymphoma. 2. Soft tissue stranding around celiac axis, SMA, and abdominal aorta may  reflect infiltrative lymphoma. 3. Left renal atrophy with left hydronephrosis likely reflecting chronic  proximal ureteral obstruction. 4. Incidentals as above. 2/24/22 CT abd/pelvis at [facility]:  FINDINGS: An enteric tube with sidehole beyond the level of the lower esophageal sphincter is seen looping on itself in the proximal stomach before terminating in the mid stomach. Status post right lower quadrant diverting ileostomy for an obstructing colonic mass. Multiple loops of gas dilated small bowel remain, with a maximal diameter of 5.4 cm whereas previously measured 3.6 cm. Dilute contrast is seen within the lateral left abdominal dilated small bowel. Moderate stool burden and bowel gas opacifies the cecum, measuring 7.3 cm in diameter.    No definite supine evidence of pneumoperitoneum. Lung bases: Limited evaluation of the lung bases demonstrates a cardiac silhouette within normal limits for size. A small bore central venous catheter is seen terminating in the right atrium. No focal consolidation or pleural effusion. 2/24/22 CT abd/pelvis [facility]:  IMPRESSION:  1. Status post right lower quadrant loop ileostomy. Mild diffuse dilation of small bowel proximal to the ostomy in the lower abdomen and upper pelvis concerning for at least mild to moderate partial bowel obstruction. Relatively decompressed loop ileostomy.   2. Mildly complex pelvic fluid collection in the rectovesical space, decreased in size from prior. 3. Redemonstrated ascending colon mass and findings suspicious for regional metastatic disease. 4. Redemonstrated soft tissue in the retroperitoneum with prominent lymph nodes, similar to prior examination. Differential would include metastasis, retroperitoneal fibrosis. 5. Mild atherosclerosis. 6. Subcentimeter right renal hypodensity, too small to characterize. 7. Severe compression of the left renal vein, similar to prior examination. 8. Additional findings as described above. Bones: No acute osseous abnormality. 2/24/22 CT chest [facility]:  IMPRESSION:  FINAL report. 1. No evidence of metastatic disease to the chest.  2. No enlarged lymph nodes. 3. Increasing volume loss in the lung bases which may be due to atelectasis. 4. CT abdomen and pelvis reported separately.     2/25/22 Colonoscopy at [facility]:  Impression:            - Preparation of the colon was inadequate.                        - Stool in the entire examined colon.                        - Likely malignant completely obstructing tumor at the                         hepatic flexure. Biopsied.                        - Malignant-appearing tumor in the colon. Complications:         No immediate complications. Assessment & Plan:   Shantel Brito is a 42 y.o. male comes in for evaluation and management of lymphoma. 1. Undifferentiated carcinoma of transverse colon, metastatic, KRAS/NRAS status unclear:  S/p diverting ileostomy due to large bowel obstruction. Colonoscopy with biopsy on 2/25/22. No obvious metastatic disease on CT imaging, but did have obvious metastatic disease to peritoneum during laparoscopy with Dr. Lino Allen. I recommended FOLFOXIRI every 2 weeks. Will not give Bevacizumab given h/o MRI and tobacco use. [employer] suggests: BRCA2 and YVONNE mutations support use of Olaparib or similar PARP inhibitor in future.  They also suggest testing for TMB, MSI, PDL1 to determine utility of checkpoint inhibitors. Supportive medications: zofran, compazine, dexamethasone, EMLA topical  -- Hold C3 of FOLFOXIRI due to neutropenia. Added neulasta with pump removal   -- [employer] testing 5/16/22 - results pending. Looking into adding [employer] or alternate NGS testing to determine TMB, MSI, PDL1, KRAS/NRAS/BRAF  -- Check CEA every 8 weeks  -- Consider repeat colonoscopy in 6 months given incomplete colonoscopy. -- Repeat CT of ch/abd/pelvis after C4.   -- Follow up in 1 week for retry C3 FOLFOXIRI with neulasta support, no Md visit this day. -- Follow up in 3 weeks C4 FOLFOXIRI, MD/NP visit. 2. H/o Follicular lymphoma:   Grade 3a with with bulky disease encircling the aorta, causing pain. Bone marrow negative for lymphoma, but was hypercellular. BR better than RCHOP, but based on GALLIUM study, Obinutuzumab-based induction and maintenance prolongs PFS over that seen with rituximab-based therapy. Therefore, pt started on O-CHOP regimen. He received 2 cycles with CR and was then switched to BR x 4 cycles given STEMI. Completed treatment 5/2019. PET completed 6/3/19 showed CR. CT 5/28/20 negative for disease. No extra surveillance needed at this time given metastatic colon cancer with frequent imaging. 3. Chronic lumbar back pain / anxiety / RLQ / insomnia:   Left lower back pain is chronic/stable and RLQ is likely related to neoplasm/colostomy - currently managing pain on Oxycontin 10mg q12h, Oxycodone q4h and Lexapro daily. Signed pain contract on 12/28/18 and following with Dr. Aretha Trejo. -- Advised starting OTC melatonin 5mg QHS prn insomnia and starting daily walking program.    4. CAD:   h/o STEMI 2/14/29 with TOM placed to proximal LAD. Following with Dr. Sherice Ruiz and remains on dual antiplatelet therapy, high dose Lipitor, Metoprolol, ACEI. Received only 2 doses of cardiotoxic chemo, Doxorubicin in early 1/2019. Is overdue for follow up with Dr. Sherice Ruiz.      5. Tobacco abuse:   Discussed benefits of quitting smoking again. Previously discussed replacing hand-to-mouth habit with another item such as Twizzlers or SlimJims. 6. HTN:   Well controlled on lisinopril. Managed by PCP. 7. BRCA2 mutation:  Will discuss with patient in future. He would benefit from genetic counseling for himself and his family. 8. Weight-loss / nausea / fatigue:   Encouraged supplementing with 2-3 boost/ensures daily in addition to meals and 60 oz water daily. Encouraged oral hydration. Also advised scheduled antiemetics on days 4-6.     9. Chemotherapy induced neutropenia:  Hold treatment today. Neulasta added with C3 forward. Advised neutropenic precautions. Goals of care: Disease is not curable, but is treatable to improve quality and duration of life. I personally saw and evaluated the patient and performed the key components of medical decision making. The history, physical exam, and documentation were performed by Trav Paniagua NP. I reviewed and verified the above documentation and modified it as needed. His ANC is only 0.9 today, so we will hold therapy and try again next week. Add neulasta with treatment moving forward.     Signed By: Asia Hernandez MD     June 13, 2022

## 2022-06-13 ENCOUNTER — HOSPITAL ENCOUNTER (OUTPATIENT)
Dept: INFUSION THERAPY | Age: 47
Discharge: HOME | End: 2022-06-13
Payer: COMMERCIAL

## 2022-06-13 ENCOUNTER — TELEPHONE (OUTPATIENT)
Dept: PALLATIVE CARE | Age: 47
End: 2022-06-13

## 2022-06-13 ENCOUNTER — OFFICE VISIT (OUTPATIENT)
Dept: ONCOLOGY | Age: 47
End: 2022-06-13

## 2022-06-13 VITALS
HEIGHT: 67 IN | HEART RATE: 76 BPM | RESPIRATION RATE: 18 BRPM | DIASTOLIC BLOOD PRESSURE: 78 MMHG | WEIGHT: 129.8 LBS | BODY MASS INDEX: 20.37 KG/M2 | SYSTOLIC BLOOD PRESSURE: 121 MMHG | OXYGEN SATURATION: 100 % | TEMPERATURE: 97.7 F

## 2022-06-13 VITALS
DIASTOLIC BLOOD PRESSURE: 78 MMHG | HEIGHT: 67 IN | HEART RATE: 76 BPM | TEMPERATURE: 97.7 F | OXYGEN SATURATION: 100 % | WEIGHT: 129.8 LBS | SYSTOLIC BLOOD PRESSURE: 121 MMHG | BODY MASS INDEX: 20.37 KG/M2

## 2022-06-13 DIAGNOSIS — C18.9 COLON ADENOCARCINOMA (HCC): Primary | ICD-10-CM

## 2022-06-13 DIAGNOSIS — C18.4 CARCINOMA OF TRANSVERSE COLON (HCC): Primary | ICD-10-CM

## 2022-06-13 DIAGNOSIS — Z51.11 CHEMOTHERAPY MANAGEMENT, ENCOUNTER FOR: ICD-10-CM

## 2022-06-13 DIAGNOSIS — G47.09 OTHER INSOMNIA: ICD-10-CM

## 2022-06-13 DIAGNOSIS — T45.1X5A CHEMOTHERAPY-INDUCED NAUSEA: ICD-10-CM

## 2022-06-13 DIAGNOSIS — R11.0 CHEMOTHERAPY-INDUCED NAUSEA: ICD-10-CM

## 2022-06-13 DIAGNOSIS — R10.84 ABDOMINAL PAIN, GENERALIZED: ICD-10-CM

## 2022-06-13 DIAGNOSIS — C82.90 FOLLICULAR LYMPHOMA, UNSPECIFIED FOLLICULAR LYMPHOMA TYPE, UNSPECIFIED BODY REGION (HCC): ICD-10-CM

## 2022-06-13 DIAGNOSIS — T45.1X5A CHEMOTHERAPY INDUCED NEUTROPENIA (HCC): ICD-10-CM

## 2022-06-13 DIAGNOSIS — D70.1 CHEMOTHERAPY INDUCED NEUTROPENIA (HCC): ICD-10-CM

## 2022-06-13 DIAGNOSIS — C78.6 PERITONEAL CARCINOMATOSIS (HCC): ICD-10-CM

## 2022-06-13 PROBLEM — Z76.89 PREVENTION OF CHEMOTHERAPY-INDUCED NEUTROPENIA: Status: ACTIVE | Noted: 2022-06-13

## 2022-06-13 PROBLEM — Z76.89 PREVENTION OF CHEMOTHERAPY-INDUCED NEUTROPENIA: Status: ACTIVE | Noted: 2022-01-01

## 2022-06-13 LAB
ALBUMIN SERPL-MCNC: 2.5 G/DL (ref 3.5–5)
ALBUMIN/GLOB SERPL: 0.6 {RATIO} (ref 1.1–2.2)
ALP SERPL-CCNC: 108 U/L (ref 45–117)
ALT SERPL-CCNC: 25 U/L (ref 12–78)
ANION GAP SERPL CALC-SCNC: 8 MMOL/L (ref 5–15)
AST SERPL-CCNC: 18 U/L (ref 15–37)
BASOPHILS # BLD: 0 K/UL (ref 0–0.1)
BASOPHILS NFR BLD: 0 % (ref 0–1)
BILIRUB SERPL-MCNC: 0.3 MG/DL (ref 0.2–1)
BUN SERPL-MCNC: 11 MG/DL (ref 6–20)
BUN/CREAT SERPL: 10 (ref 12–20)
CALCIUM SERPL-MCNC: 9.4 MG/DL (ref 8.5–10.1)
CEA SERPL-MCNC: 17.9 NG/ML
CHLORIDE SERPL-SCNC: 108 MMOL/L (ref 97–108)
CO2 SERPL-SCNC: 23 MMOL/L (ref 21–32)
CREAT SERPL-MCNC: 1.05 MG/DL (ref 0.7–1.3)
DIFFERENTIAL METHOD BLD: ABNORMAL
EOSINOPHIL # BLD: 0 K/UL (ref 0–0.4)
EOSINOPHIL NFR BLD: 1 % (ref 0–7)
ERYTHROCYTE [DISTWIDTH] IN BLOOD BY AUTOMATED COUNT: 19.4 % (ref 11.5–14.5)
GLOBULIN SER CALC-MCNC: 4.2 G/DL (ref 2–4)
GLUCOSE SERPL-MCNC: 114 MG/DL (ref 65–100)
HCT VFR BLD AUTO: 33 % (ref 36.6–50.3)
HGB BLD-MCNC: 10.2 G/DL (ref 12.1–17)
IMM GRANULOCYTES # BLD AUTO: 0 K/UL
IMM GRANULOCYTES NFR BLD AUTO: 0 %
LYMPHOCYTES # BLD: 1.8 K/UL (ref 0.8–3.5)
LYMPHOCYTES NFR BLD: 61 % (ref 12–49)
MCH RBC QN AUTO: 25 PG (ref 26–34)
MCHC RBC AUTO-ENTMCNC: 30.9 G/DL (ref 30–36.5)
MCV RBC AUTO: 80.9 FL (ref 80–99)
MONOCYTES # BLD: 0.1 K/UL (ref 0–1)
MONOCYTES NFR BLD: 5 % (ref 5–13)
NEUTS BAND NFR BLD MANUAL: 1 % (ref 0–6)
NEUTS SEG # BLD: 0.9 K/UL (ref 1.8–8)
NEUTS SEG NFR BLD: 32 % (ref 32–75)
NRBC # BLD: 0 K/UL (ref 0–0.01)
NRBC BLD-RTO: 0 PER 100 WBC
PLATELET # BLD AUTO: 163 K/UL (ref 150–400)
PMV BLD AUTO: 9.9 FL (ref 8.9–12.9)
POTASSIUM SERPL-SCNC: 3.5 MMOL/L (ref 3.5–5.1)
PROT SERPL-MCNC: 6.7 G/DL (ref 6.4–8.2)
RBC # BLD AUTO: 4.08 M/UL (ref 4.1–5.7)
RBC MORPH BLD: ABNORMAL
SODIUM SERPL-SCNC: 139 MMOL/L (ref 136–145)
WBC # BLD AUTO: 2.8 K/UL (ref 4.1–11.1)

## 2022-06-13 PROCEDURE — 82378 CARCINOEMBRYONIC ANTIGEN: CPT

## 2022-06-13 PROCEDURE — 36591 DRAW BLOOD OFF VENOUS DEVICE: CPT

## 2022-06-13 PROCEDURE — 77030012965 HC NDL HUBR BBMI -A

## 2022-06-13 PROCEDURE — 74011250636 HC RX REV CODE- 250/636: Performed by: INTERNAL MEDICINE

## 2022-06-13 PROCEDURE — 85025 COMPLETE CBC W/AUTO DIFF WBC: CPT

## 2022-06-13 PROCEDURE — 80053 COMPREHEN METABOLIC PANEL: CPT

## 2022-06-13 PROCEDURE — 74011000250 HC RX REV CODE- 250: Performed by: INTERNAL MEDICINE

## 2022-06-13 PROCEDURE — 99215 OFFICE O/P EST HI 40 MIN: CPT | Performed by: INTERNAL MEDICINE

## 2022-06-13 RX ORDER — ALBUTEROL SULFATE 0.83 MG/ML
2.5 SOLUTION RESPIRATORY (INHALATION) AS NEEDED
Status: CANCELLED
Start: 2022-06-20

## 2022-06-13 RX ORDER — ACETAMINOPHEN 325 MG/1
650 TABLET ORAL AS NEEDED
Status: CANCELLED
Start: 2022-06-20

## 2022-06-13 RX ORDER — OXYCODONE HYDROCHLORIDE 10 MG/1
10 TABLET ORAL
Qty: 90 TABLET | Refills: 0 | Status: SHIPPED | OUTPATIENT
Start: 2022-06-13 | End: 2022-06-20 | Stop reason: SDUPTHER

## 2022-06-13 RX ORDER — HEPARIN 100 UNIT/ML
300-500 SYRINGE INTRAVENOUS AS NEEDED
Status: CANCELLED | OUTPATIENT
Start: 2022-06-20

## 2022-06-13 RX ORDER — DIPHENHYDRAMINE HYDROCHLORIDE 50 MG/ML
50 INJECTION, SOLUTION INTRAMUSCULAR; INTRAVENOUS AS NEEDED
Status: CANCELLED
Start: 2022-06-20

## 2022-06-13 RX ORDER — SODIUM CHLORIDE 9 MG/ML
25 INJECTION, SOLUTION INTRAVENOUS CONTINUOUS
Status: CANCELLED
Start: 2022-06-20

## 2022-06-13 RX ORDER — ONDANSETRON 2 MG/ML
8 INJECTION INTRAMUSCULAR; INTRAVENOUS AS NEEDED
Status: CANCELLED | OUTPATIENT
Start: 2022-06-20

## 2022-06-13 RX ORDER — DEXTROSE MONOHYDRATE 50 MG/ML
25 INJECTION, SOLUTION INTRAVENOUS CONTINUOUS
Status: CANCELLED
Start: 2022-06-20

## 2022-06-13 RX ORDER — SODIUM CHLORIDE 9 MG/ML
10 INJECTION INTRAMUSCULAR; INTRAVENOUS; SUBCUTANEOUS AS NEEDED
Status: ACTIVE | OUTPATIENT
Start: 2022-06-13 | End: 2022-06-13

## 2022-06-13 RX ORDER — SODIUM CHLORIDE 9 MG/ML
10 INJECTION INTRAMUSCULAR; INTRAVENOUS; SUBCUTANEOUS AS NEEDED
Status: CANCELLED | OUTPATIENT
Start: 2022-06-20

## 2022-06-13 RX ORDER — SODIUM CHLORIDE 0.9 % (FLUSH) 0.9 %
10 SYRINGE (ML) INJECTION AS NEEDED
Status: DISPENSED | OUTPATIENT
Start: 2022-06-13 | End: 2022-06-13

## 2022-06-13 RX ORDER — OXYCODONE HYDROCHLORIDE 10 MG/1
10 TABLET ORAL
COMMUNITY
End: 2022-06-13 | Stop reason: SDUPTHER

## 2022-06-13 RX ORDER — DIPHENHYDRAMINE HYDROCHLORIDE 50 MG/ML
25 INJECTION, SOLUTION INTRAMUSCULAR; INTRAVENOUS AS NEEDED
Status: CANCELLED
Start: 2022-06-20

## 2022-06-13 RX ORDER — EPINEPHRINE 1 MG/ML
0.3 INJECTION, SOLUTION, CONCENTRATE INTRAVENOUS AS NEEDED
Status: CANCELLED | OUTPATIENT
Start: 2022-06-20

## 2022-06-13 RX ORDER — PALONOSETRON 0.05 MG/ML
0.25 INJECTION, SOLUTION INTRAVENOUS ONCE
Status: CANCELLED | OUTPATIENT
Start: 2022-06-20 | End: 2022-06-20

## 2022-06-13 RX ORDER — ATROPINE SULFATE 0.4 MG/ML
0.4 INJECTION, SOLUTION ENDOTRACHEAL; INTRAMEDULLARY; INTRAMUSCULAR; INTRAVENOUS; SUBCUTANEOUS
Status: CANCELLED | OUTPATIENT
Start: 2022-06-20

## 2022-06-13 RX ORDER — SODIUM CHLORIDE 0.9 % (FLUSH) 0.9 %
10 SYRINGE (ML) INJECTION AS NEEDED
Status: CANCELLED | OUTPATIENT
Start: 2022-06-20

## 2022-06-13 RX ORDER — HYDROCORTISONE SODIUM SUCCINATE 100 MG/2ML
100 INJECTION, POWDER, FOR SOLUTION INTRAMUSCULAR; INTRAVENOUS AS NEEDED
Status: CANCELLED | OUTPATIENT
Start: 2022-06-20

## 2022-06-13 RX ORDER — HEPARIN 100 UNIT/ML
300-500 SYRINGE INTRAVENOUS AS NEEDED
Status: ACTIVE | OUTPATIENT
Start: 2022-06-13 | End: 2022-06-13

## 2022-06-13 RX ADMIN — SODIUM CHLORIDE, PRESERVATIVE FREE 10 ML: 5 INJECTION INTRAVENOUS at 09:28

## 2022-06-13 RX ADMIN — SODIUM CHLORIDE, PRESERVATIVE FREE 10 ML: 5 INJECTION INTRAVENOUS at 08:00

## 2022-06-13 RX ADMIN — SODIUM CHLORIDE 10 ML: 9 INJECTION INTRAMUSCULAR; INTRAVENOUS; SUBCUTANEOUS at 08:00

## 2022-06-13 RX ADMIN — Medication 500 UNITS: at 09:28

## 2022-06-13 RX ADMIN — SODIUM CHLORIDE, PRESERVATIVE FREE 10 ML: 5 INJECTION INTRAVENOUS at 08:01

## 2022-06-13 NOTE — TELEPHONE ENCOUNTER
Palliative Medicine  Nursing Note  [phone]  Fax [phone]      Telephone Call  Patient Name: Marie Santa. YOB: [birth year]        Primary Decision Maker: Haja Srivastava - Girlfriend - [phone]   Advance Care Planning 6/13/2022   Patient's Healthcare Decision Maker is: Legal Next of Kin   Confirm Advance Directive None   Patient Would Like to Complete Advance Directive No   Does the patient have other document types -       Call returned to Mr. Calos Flaherty. He shared that he ran out of his Oxycodone IR 10mg yesterday. He does have and has been taking his Oxycontin 10mg. He is hurting today without the Oxycodone IR and is requesting a refill. Reiterated to please call Palliative several days prior to running out medication so it can be re-ordered. He verbalized understanding.     Triage for Controlled Substance Refill Request    Pain Diagnosis: Colon cancer    Last Outpatient Visit: 5/25/22    Next Outpatient Visit: 6/20/22    Reason for refill needed outside of office visit-   Appointment not scheduled prior to need for scheduled refill,     Any Reported Side effects: _None    Last dose taken: Yesterday    Pharmacy: [facility]    Medication: Oxycodone IR  Dose and directions: 10mg 1 every 4 hours prn  Number dispensed: 90  Date filled ( or Pharmacy): 5/27/22  # left: None       reviewed: Yes    Date of Urine Drug Screen:      Opioid Safety Handout given:  yes    Appropriate for refill:  Yes    Action:  Pended for Dr. Annalise Rojo, RN  Palliative Medicine

## 2022-06-13 NOTE — TELEPHONE ENCOUNTER
The patient is requesting a refill for Oxycodone. To be called in to [facility]. He is completely out.

## 2022-06-13 NOTE — PROGRESS NOTES
[address] [facility] at [address] [place] [address]  [phone]    Hematology / Oncology Established Visit    Reason for Visit:   Juan Orozco is a 42 y.o. male who is seen for urgent follow up of colon cancer, h/o lymphoma. Hematology Oncology Treatment History:     Diagnosis #1: Follicular lymphoma  Stage: IV  Pathology:   11/13/18 right inguinal LN excision: Follicular lymphoma, high-grade (grade 3a of 3). Prior Treatment:   1. Obinutuzumab-CHOP. Obinutuzumab: 1000 mg weekly on days 1, 8, 15 for cycle 1, then 1000 mg on day 1 q21 days for cycles 2-6, then monotherapy 1000 mg every 21 days for cycle 7, 8 with Cyclophosphamide 750mg/m2, Doxorubicin 50mg/m2, Vincristine 1.4mg/m2 on day 1 and Prednisone 100mg on Days 1-5, every 21 days for a total of 2 cycles completed late 1/2019. Regimen discontinued due to STEMI. 2. Obinutuzumab + Bendamustine: 1000 mg Obinutuzumab on day 1 + Bendamustine 90mg/m2 on days 1-2 on a 28-day cycle x 4 cycles, completed 5/2019  Current Treatment: Surveillance      Diagnosis #2: Colon cancer:  Stage: IV  Pathology:  Ascending colon; biopsy: poorly differentiated carcinoma  Comment: No dysplasia is identified. Immunohistochemical stains performed on block 1A, show the tumor positive for Cam5.2 and shows patchy positivity for CDX2 with a Ki-67 index of -90%; the tumor is focally positive for CK5/6 and GATA3; negative for p63, Pax8, CK7, CK20, panmelanoma, synaptophysin and chromogranin. The immunophenotypic features are nonspecific but argues somewhat against the following carcinoma: urothelial, neuroendocrine and breast. The immunophenotypic features also argues against melanoma and somewhat against classic colorectal carcinoma. Additional immunohistochemical stains to exclude the possibility of prostate and hepatocellular carcinoma have been   ordered; the results will be reported as an addendum.   MLH1/MSH2/MSH6/PMS2 all intact with no loss of nuclear expression of MMR proteins - no MSI   Addendum: The addendum is issued to report the results of additional immunohistochemical stains in an attempt to ascertain the primary site of the carcinoma. The diagnosis is unchanged. Hepar and glypican 3 immunohistochemical stains are neg, arguing against hepatocellular carcinoma. PSA stain is negative, arguing against prostatic primary. Imaging studies to eval for poss primary sites maybe useful. In the absence of carcinoma/tumor at any other sites, this may represent an undifferentiated carcinoma of the colon. Oncogenomics (molecular) studies: POLE< ARID1A, YVONNE, ATR, BRCA2, CHECK1, FANCI, NF1, PIK3CA, PTCH1, PTEN, RAD50, RAD51, RB1, SMARCA4, STK11      Prior Treatment:   1. Loop ileostomy 2/19/22  2. Diagnotic laparoscopy with peritoneal biopsy, 4/27/22      Current Treatment: FOLFOXIRI every 2 weeks until disease progression or toxicity, 5/16/22 - current        Oncologic History:  Was in his usual state of health in early November 2018 when he developed low back pain and presented to the ER. Work-up at outside hospital revealed a retroperitoneal mass seen on CT imaging, and he was transferred to [facility] for further work-up. CT there showed a large retroperitoneal mass encircling the aorta with invasion of the left renal hilum and left adrenal gland. There were bilateral inguinal lymph nodes and moderate left hydronephrosis. He was evaluated while at [facility] and was noted to have palpable nodes in his groin for approximately the past 1 month. He underwent excisional LN biopsy of right inguinal LN, which revealed follicular lymphoma. At time of diagnosis, he had no cardiac disease aside from HTN, hyperlipidemia. However, he did have an NSTEMI after cycle 2 of O-CHOP. Was likely unrelated to chemotherapy, but opted to switch treatment to Obinutuzumab-Bendamustine. He completed treatment in 5/2019 and had a CR based on PET.     History of Present Illness:   Mr. Graves Grade is a 42 y.o. male with prior h/o follicular lymphoma is seen for follow up of colon cancer and C4 of treatment. Signatera repeat test drawn downstairs today. He denies neuropathy. Is having loose watery stools 1 week after chemo. Eating/drinking well. Lower abdominal pain is improving slightly. No HA, vision. No nausea. PMHx: Lymphoma in 2018, CAD, HTN, Hyperlipidemia, Anxiety, chronic low back pain  PSurgHx: None aside from cardiac stent placement and port placement  SHx: Smokes 1/2ppd x past 20 yrs. Works part time as a cook. Has 2 children. FHx: Father had leukemia, passed away from pancreatic cancer. Review of Systems: A complete review of systems was obtained, negative except as described above. Physical Exam:     Visit Vitals  BP (!) 136/91   Pulse 80   Temp 98.3 °F (36.8 °C)   Resp 16   Ht 5' 7\" (1.702 m)   Wt 130 lb 11 oz (59.3 kg)   SpO2 100%   BMI 20.47 kg/m²       ECOG PS: 0  General: no distress  Eyes: anicteric sclerae  HENT: oropharynx clear  Neck: supple  Lymphatic: no cervical, supraclavicular adenopathy  Respiratory: normal respiratory effort  CV: no peripheral edema, port upper chest   GI: soft, nontender, nondistended, no masses;  colostomy in place. Skin: no rashes; no ecchymoses; no petechiae      Results:     Lab Results   Component Value Date/Time    WBC 8.1 07/05/2022 08:13 AM    HGB 11.6 (L) 07/05/2022 08:13 AM    HCT 36.9 07/05/2022 08:13 AM    PLATELET 412 (L) 30/85/2501 08:13 AM    MCV 86.0 07/05/2022 08:13 AM    ABS.  NEUTROPHILS PENDING 07/05/2022 08:13 AM    Hemoglobin (POC) 15.0 06/05/2009 02:13 PM    Hematocrit (POC) 39 02/14/2019 01:24 PM     Lab Results   Component Value Date/Time    Sodium 142 06/20/2022 08:15 AM    Potassium 3.4 (L) 06/20/2022 08:15 AM    Chloride 108 06/20/2022 08:15 AM    CO2 24 06/20/2022 08:15 AM    Glucose 129 (H) 06/20/2022 08:15 AM    BUN 8 06/20/2022 08:15 AM    Creatinine 1.04 06/20/2022 08:15 AM    GFR est AA >60 06/20/2022 08:15 AM GFR est non-AA >60 2022 08:15 AM    Calcium 9.5 2022 08:15 AM    Sodium (POC) 136 2019 01:24 PM    Potassium (POC) 3.9 2019 01:24 PM    Chloride (POC) 102 2019 01:24 PM    Glucose (POC) 249 (H) 02/15/2019 10:21 PM    BUN (POC) 14 2019 01:24 PM    Creatinine (POC) 0.9 2019 01:24 PM    Calcium, ionized (POC) 1.24 2019 01:24 PM     Lab Results   Component Value Date/Time    Bilirubin, total 0.2 2022 08:15 AM    ALT (SGPT) 23 2022 08:15 AM    Alk. phosphatase 128 (H) 2022 08:15 AM    Protein, total 7.0 2022 08:15 AM    Albumin 2.9 (L) 2022 08:15 AM    Globulin 4.1 (H) 2022 08:15 AM     Lab Results   Component Value Date/Time    Iron 18 (L) 2022 11:13 AM    TIBC 173 (L) 2022 11:13 AM    Iron % saturation 10 (L) 2022 11:13 AM    Ferritin 426 (H) 2022 11:13 AM       No results found for: B12LT, FOL, RBCF  Lab Results   Component Value Date/Time    TSH 1.53 2016 04:40 AM       Lab Results   Component Value Date/Time    Hepatitis A, IgM NONREACTIVE 2018 04:53 PM    Hepatitis B surface Ag <0.10 2022 08:16 AM    Hepatitis B surface Ab <3.10 2022 08:16 AM    Hepatitis B core, IgM NONREACTIVE 2018 04:53 PM    Hep B Core Ab, total Negative 2022 08:16 AM       18:       Labs at U:  22: CA 19-9 = 390  22: CEA = 12.3  22: CEA = 19.2  22: CEA = 17.9     Signatera MRD:  22: 295.97 MTM/mL  22: pending    Imagin/9/18 Abd/pelvis CT: IMPRESSION:  1. Interval development of a large retroperitoneal mass encircling the aorta with invasion of the left renal hilum and left adrenal gland. Several adjacent lymph nodes are seen extending into the peritoneum and underneath the  diaphragmatic natalie. This most likely represents lymphoma. 2. Several new bilateral enlarged inguinal lymph nodes also likely representing lymphoma.   3. Moderate left hydronephrosis with a delayed renal nephrogram related to decreased renal function. This is related to the invasion of the renal hilum. 11/11/18 Chest CT: IMPRESSION:  Trace left pleural effusion. Bilateral lower lobe atelectasis. Large  retroperitoneal mass lesion again demonstrated. PET 12/18/18:  FINDINGS:  HEAD/NECK: Right palatine tonsil intense hypermetabolism, max SUV 18. Multilevel  bilateral cervical adenopathy, with max SUV 12 in a left supraclavicular node. Cerebral evaluation is limited by normal intense activity. CHEST: Solitary hypermetabolic left axillary node, max SUV 11. ABDOMEN/PELVIS: Bulky retroperitoneal mass max SUV 27, with several additional  small active abdomino-pelvic nodes. Bilateral inguinal nodes with max SUV 12 on  the left. SKELETON: No foci of abnormal hypermetabolism in the axial and visualized  appendicular skeleton. IMPRESSION:   1. Right palatine tonsil tumor involvement (Deauville 5). 2. Bilateral cervical delaney involvement (Deauville 5). 3. Left axillary node involvement (Deauville 5). 4. Bulky retroperitoneal lymphoma mass and additional smaller hypermetabolic  abdomino-pelvic nodes (Deauville 5). 5. Bilateral inguinal delaney involvement (Deauville 5). Deauville Five Point Scale  1. No uptake or no residual uptake (when used interim)  2. Slight uptake, but below blood pool (mediastinum)  3. Uptake above mediastinal, but below/equal to uptake in the liver  4. Uptake slightly to moderately higher than liver  5. Markedly increased uptake or any new lesion (on response evaluation)  Each FDG-avid (or previously FDG avid) lesion is rated independently. Reference values:  Mediastinal blood pool: 2.1 SUV  Liver (background): 2.2 SUV    PET/CT 2/05/19:   IMPRESSION:  1. No Foci of Abnormal Hypermetabolism (Deauville 1).   2. Resolved activity in the right palatine tonsil, bilateral cervical nodes,left axillary node, retroperitoneal/abdominal pelvic adenopathy, bilateral inguinal nodes. Echo 2/14/19:  Normal cavity size, wall thickness and systolic function (ejection fraction normal). The muscle mass is normal. The cavity shape is normal. The estimated ejection fraction is 41 - 45%. Abnormal wall motion as described on the wall scoring diagram below. End-systolic volume is normal. Normal left ventricular strain. There is mild (grade 1) left ventricular diastolic dysfunction. Normal left ventricular diastolic pressure. End-diastolic volume is normal.    LE arterial duplex 2/22/19:  There is evidence of left groin pseudoaneurysm noted arising from distal common femoral artery, pseudo lobe measures 2.32cm x 2.58cm and pseudo neck length measuring 0.63cm. There is no evidence of hemodynamically significant left lower extremity arterial obstruction. JACLYN is 1.03 on the right and 1.02 on the left. LE arterial duplex s/p Thrombin Injection to Pseudoaneurysm 2/26/19:  Successful thrombin injection procedure of the left groin with no further flow seen. No evidence of hemodnyamically significant obstruction in the left lower extremity. Left lower extremity arterial duplex performed. Confirmed pseudoaneurysm in left groin with small neck. Following thrombin injection, no further flow seen in the pseudoaneurysm. The left common femoral, profunda femoral, femoral, popliteal, posterior tibial and anterior arteries were imaged. Mainly triphasic flow was seen with no evidence of significantly elevated velocities. Repeat LE arterial duplex 2/27/19:  Continued thrombosed left groin pseudoaneurysm following thrombin injection on 02/26/2019. No flow or color fill is identified. The hematoma measures approximately 2.1 x 2.9 cm in diameter. The common femoral, deep femoral, femoral, and popliteal arteries are patent with mainly tri-phasic flow and no significant hemodynamically obstruction is noted.     Stress 5/31/19:  · Normal stress myocardial perfusion without ischemia or infact at 84% MPHR. Normal LV function. LVEF 60%. · No EKG changes of ischemia at peak exercise. · Normal functional capacity. PET 6/03/19: IMPRESSION: No Foci of Abnormal Hypermetabolism (Deauville 1). -MRI lumbar spine 12/18/19:  Mild disc degenerative change at L3-4 and L4-5. Mild canal stenosis at L3-4 and mild left foraminal stenosis at L4-5. Other less severe degenerative findings are as described above. Continued diminished size of retroperitoneal mass-adenopathy,  with diminished soft tissue density at the left renal hilum. -CT chest/abdomen 12/16/19:  Findings are consistent with interval response to therapy    CT c/a/p 5/28/20: IMPRESSION:  Further contraction of the retroperitoneal mantle and chris mesentery,  compatible with treatment response  Stable left hilar soft tissue mass  Slight increased splaying of the duodenum and proximal jejunum is the result, without obstruction  No evidence for recurrence of lymphoma in the chest, abdomen, or pelvis    CT c/a/p 2/13/22:  FINDINGS:   LOWER THORAX: Dependent atelectasis with otherwise clear lungs. The visualized  heart is normal in size without pericardial effusion. LIVER: No mass. BILIARY TREE: Gallbladder is unremarkable. CBD is not dilated. SPLEEN: Unremarkable. PANCREAS: No mass or ductal dilatation. ADRENALS: Unremarkable. KIDNEYS: Atrophic left kidney with mild left hydronephrosis. Right kidney is  unremarkable with no stone, enhancing mass, or other renal abnormality. STOMACH: Unremarkable. SMALL BOWEL: No dilatation or wall thickening. COLON: Circumferential wall thickening at the hepatic flexure with luminal  narrowing, adjacent mesenteric stranding, and fluid with upstream retention in  the cecum and fecalization of distal ileal contents. No dilation or other wall  thickening. APPENDIX: Unremarkable. PERITONEUM: Moderate free fluid with no pneumoperitoneum.   RETROPERITONEUM: Soft tissue stranding around the celiac and SMA and associated aorta with no discrete mass or adenopathy. Aorta is normal in size without aneurysm or dissection. REPRODUCTIVE ORGANS: Prostate and seminal vesicles appear unremarkable. URINARY BLADDER: No mass or calculus. BONES: No destructive bone lesion. ABDOMINAL WALL: No mass or hernia. ADDITIONAL COMMENTS: N/A  IMPRESSION  1. Annular mass lesion of the right colon with upstream fecal retention  concerning for primary colon neoplasm versus less likely lymphoma. 2. Soft tissue stranding around celiac axis, SMA, and abdominal aorta may  reflect infiltrative lymphoma. 3. Left renal atrophy with left hydronephrosis likely reflecting chronic  proximal ureteral obstruction. 4. Incidentals as above. 2/24/22 CT abd/pelvis at [facility]:  FINDINGS: An enteric tube with sidehole beyond the level of the lower esophageal sphincter is seen looping on itself in the proximal stomach before terminating in the mid stomach. Status post right lower quadrant diverting ileostomy for an obstructing colonic mass. Multiple loops of gas dilated small bowel remain, with a maximal diameter of 5.4 cm whereas previously measured 3.6 cm. Dilute contrast is seen within the lateral left abdominal dilated small bowel. Moderate stool burden and bowel gas opacifies the cecum, measuring 7.3 cm in diameter.    No definite supine evidence of pneumoperitoneum. Lung bases: Limited evaluation of the lung bases demonstrates a cardiac silhouette within normal limits for size. A small bore central venous catheter is seen terminating in the right atrium. No focal consolidation or pleural effusion. 2/24/22 CT abd/pelvis [facility]:  IMPRESSION:  1. Status post right lower quadrant loop ileostomy. Mild diffuse dilation of small bowel proximal to the ostomy in the lower abdomen and upper pelvis concerning for at least mild to moderate partial bowel obstruction. Relatively decompressed loop ileostomy.   2. Mildly complex pelvic fluid collection in the rectovesical space, decreased in size from prior. 3. Redemonstrated ascending colon mass and findings suspicious for regional metastatic disease. 4. Redemonstrated soft tissue in the retroperitoneum with prominent lymph nodes, similar to prior examination. Differential would include metastasis, retroperitoneal fibrosis. 5. Mild atherosclerosis. 6. Subcentimeter right renal hypodensity, too small to characterize. 7. Severe compression of the left renal vein, similar to prior examination. 8. Additional findings as described above. Bones: No acute osseous abnormality. 2/24/22 CT chest [facility]:  IMPRESSION:  FINAL report. 1. No evidence of metastatic disease to the chest.  2. No enlarged lymph nodes. 3. Increasing volume loss in the lung bases which may be due to atelectasis. 4. CT abdomen and pelvis reported separately.     2/25/22 Colonoscopy at [facility]:  Impression:            - Preparation of the colon was inadequate.                        - Stool in the entire examined colon.                        - Likely malignant completely obstructing tumor at the                         hepatic flexure. Biopsied.                        - Malignant-appearing tumor in the colon. Complications:         No immediate complications. Assessment & Plan:   Sivakumar Diaz. is a 42 y.o. male comes in for evaluation and management of lymphoma. 1. Undifferentiated carcinoma of transverse colon, metastatic, KRAS/NRAS status unclear:  S/p diverting ileostomy due to large bowel obstruction. Colonoscopy with biopsy on 2/25/22. No obvious metastatic disease on CT imaging, but did have obvious metastatic disease to peritoneum during laparoscopy with Dr. Bob Webber. I recommended FOLFOXIRI every 2 weeks. Will not give Bevacizumab given h/o MRI and tobacco use. [employer] suggests: BRCA2 and YVONNE mutations support use of Olaparib or similar PARP inhibitor in future.  They also suggest testing for TMB, MSI, PDL1 to determine utility of checkpoint inhibitors. Supportive medications: zofran, compazine, dexamethasone, EMLA topical  -- Proceed with C4 of FOLFOXIRI with neulasta support. Added neulasta with pump removal C3 forward. -- Repeat Signatera testing due 7/5/22. Looking into adding [employer] or alternate NGS testing to determine TMB, MSI, PDL1, KRAS/NRAS/BRAF. Repeat signatera   -- Check CEA every 8 weeks  -- Consider repeat colonoscopy in 6 months given incomplete colonoscopy. -- Repeat CT of ch/abd/pelvis after C4.   -- Follow up in 2 weeks C5 FOLFOXIRI, MD/NP visit. 2. H/o Follicular lymphoma:   Grade 3a with with bulky disease encircling the aorta, causing pain. Bone marrow negative for lymphoma, but was hypercellular. BR better than RCHOP, but based on GALLIUM study, Obinutuzumab-based induction and maintenance prolongs PFS over that seen with rituximab-based therapy. Therefore, pt started on O-CHOP regimen. He received 2 cycles with CR and was then switched to BR x 4 cycles given STEMI. Completed treatment 5/2019. PET completed 6/3/19 showed CR. CT 5/28/20 negative for disease. No extra surveillance needed at this time given metastatic colon cancer with frequent imaging. 3. Chronic lumbar back pain / anxiety / RLQ / insomnia:   Left lower back pain is chronic/stable and RLQ is likely related to neoplasm/colostomy - currently managing pain on Oxycontin 10mg q12h, Oxycodone q4h and Lexapro daily. Signed pain contract on 12/28/18 and following with Dr. Henok Ramirez. -- Advised starting OTC melatonin 5mg QHS prn insomnia and starting daily walking program.    4. CAD / HTN:   h/o STEMI 2/14/29 with TOM placed to proximal LAD. Following with Dr. Kelli Kitchen and remains on dual antiplatelet therapy, high dose Lipitor, Metoprolol, ACEI. Received only 2 doses of cardiotoxic chemo, Doxorubicin in early 1/2019. Is overdue for follow up with Dr. Kelli Kitchen. 5. Tobacco abuse:   Discussed benefits of quitting smoking again.  Previously discussed replacing hand-to-mouth habit with another item such as Twizzlers or SlimJims. 6. Diarrhea:  2/2 chemotherapy and advised on Imodium. 7. BRCA2 mutation:  Will discuss with patient in future. He would benefit from genetic counseling for himself and his family. 8. Weight-loss/ fatigue:   Encouraged supplementing with 2-3 boost/ensures daily in addition to meals and 60 oz water daily. Encouraged oral hydration. Also advised scheduled antiemetics on days 4-6.     9. Chemotherapy induced neutropenia:  Neulasta added with C3 forward. Advised neutropenic precautions. Goals of care: Disease is not curable, but is treatable to improve quality and duration of life. I personally provided the service today.      Signed By: Nolberto Kelley MD     July 5, 2022

## 2022-06-13 NOTE — PROGRESS NOTES
Nora Hawthorne is a 42 y.o. male here for follow up of colon cancer. Patient with complaints of abdominal pain, rates as an 8 out of 10.

## 2022-06-13 NOTE — PROGRESS NOTES
[facility] Short Visit Progress Note    [id number] Patient admitted to [facility] ambulatory in stable condition. Assessment completed. No new concerns voiced. Covid Screening      1. Do you have any symptoms of COVID-19? SOB, coughing, fever, or generally not feeling well ? no  2. Have you been exposed to COVID-19 recently? no  3. Have you had any recent contact with family/friend that has a pending COVID test? no    Vital Signs:  Visit Vitals  /78   Pulse 76   Temp 97.7 °F (36.5 °C)   Resp 18   Ht 5' 7\" (1.702 m)   Wt 58.9 kg (129 lb 12.8 oz)   SpO2 100%   BMI 20.33 kg/m²         PAC with positive blood return. Lab Results:  Recent Results (from the past 12 hour(s))   CBC WITH AUTOMATED DIFF    Collection Time: 06/13/22  7:55 AM   Result Value Ref Range    WBC 2.8 (L) 4.1 - 11.1 K/uL    RBC 4.08 (L) 4.10 - 5.70 M/uL    HGB 10.2 (L) 12.1 - 17.0 g/dL    HCT 33.0 (L) 36.6 - 50.3 %    MCV 80.9 80.0 - 99.0 FL    MCH 25.0 (L) 26.0 - 34.0 PG    MCHC 30.9 30.0 - 36.5 g/dL    RDW 19.4 (H) 11.5 - 14.5 %    PLATELET 822 690 - 276 K/uL    MPV 9.9 8.9 - 12.9 FL    NRBC 0.0 0  WBC    ABSOLUTE NRBC 0.00 0.00 - 0.01 K/uL    NEUTROPHILS 32 32 - 75 %    BAND NEUTROPHILS 1 0 - 6 %    LYMPHOCYTES 61 (H) 12 - 49 %    MONOCYTES 5 5 - 13 %    EOSINOPHILS 1 0 - 7 %    BASOPHILS 0 0 - 1 %    IMMATURE GRANULOCYTES 0 %    ABS. NEUTROPHILS 0.9 (L) 1.8 - 8.0 K/UL    ABS. LYMPHOCYTES 1.8 0.8 - 3.5 K/UL    ABS. MONOCYTES 0.1 0.0 - 1.0 K/UL    ABS. EOSINOPHILS 0.0 0.0 - 0.4 K/UL    ABS. BASOPHILS 0.0 0.0 - 0.1 K/UL    ABS. IMM.  GRANS. 0.0 K/UL    DF MANUAL      RBC COMMENTS ANISOCYTOSIS  1+       METABOLIC PANEL, COMPREHENSIVE    Collection Time: 06/13/22  7:55 AM   Result Value Ref Range    Sodium 139 136 - 145 mmol/L    Potassium 3.5 3.5 - 5.1 mmol/L    Chloride 108 97 - 108 mmol/L    CO2 23 21 - 32 mmol/L    Anion gap 8 5 - 15 mmol/L    Glucose 114 (H) 65 - 100 mg/dL    BUN 11 6 - 20 MG/DL    Creatinine 1. 05 0.70 - 1.30 MG/DL    BUN/Creatinine ratio 10 (L) 12 - 20      GFR est AA >60 >60 ml/min/1.73m2    GFR est non-AA >60 >60 ml/min/1.73m2    Calcium 9.4 8.5 - 10.1 MG/DL    Bilirubin, total 0.3 0.2 - 1.0 MG/DL    ALT (SGPT) 25 12 - 78 U/L    AST (SGOT) 18 15 - 37 U/L    Alk. phosphatase 108 45 - 117 U/L    Protein, total 6.7 6.4 - 8.2 g/dL    Albumin 2.5 (L) 3.5 - 5.0 g/dL    Globulin 4.2 (H) 2.0 - 4.0 g/dL    A-G Ratio 0.6 (L) 1.1 - 2.2       Todays treatment held per MD- Pharmacy [facility] notified)      Medications:  Medications Administered     0.9% sodium chloride injection 10 mL     Admin Date  06/13/2022 Action  Given Dose  10 mL Route  IntraVENous Administered By  Jay Reid, Paco [facility] [address] Date  06/13/2022 Action  Given Dose  10 mL Route  IntraVENous Administered By  Jay Reid RN           Admin Date  06/13/2022 Action  Given Dose  10 mL Route  IntraVENous Administered By  Jay Reid RN          heparin (porcine) pf 300-500 Units     Admin Date  06/13/2022 Action  Given Dose  500 Units Route  InterCATHeter Administered By  Jay Reid RN          sodium chloride (NS) flush 10 mL     Admin Date  06/13/2022 Action  Given Dose  10 mL Route  IntraVENous Administered By  Jay Reid RN                Patient tolerated treatment well. Patient discharged from [facility] ambulatory in no distress at . Patient aware of next appointment.     Future Appointments   Date Time Provider Dahlia Grissomi   6/20/2022  7:30 AM [facility]   6/20/2022 10:30 AM Nancy David MD PCS BS AMB   6/22/2022  2:00 PM [facility]   6/27/2022 10:00 AM Kiara Coon DPM RPP BS AMB   7/5/2022  7:30 AM [facility]   7/5/2022  8:15 AM Meghan Blake, NP [facility] AMB   7/7/2022  2:00 PM SS INF4 CH4 [facility]1H [facility]   7/18/2022  7:30 AM SS INF2 CH2 >7H [facility]   7/20/2022  2:00 PM SS INF5 CH4 <1H [facility] Carnes

## 2022-06-15 ENCOUNTER — HOSPITAL ENCOUNTER (OUTPATIENT)
Dept: INFUSION THERAPY | Age: 47
Discharge: HOME | End: 2022-06-15

## 2022-06-17 ENCOUNTER — TELEPHONE (OUTPATIENT)
Dept: PALLATIVE CARE | Age: 47
End: 2022-06-17

## 2022-06-19 NOTE — PROGRESS NOTES
Palliative Medicine Outpatient Services  [place]: [phone] ([phone])    Patient Name: Glenn Santillan YOB: 1975     Date of Current Visit: 06/20/22  Location of Current Visit:    [] [facility] Office  [x] [facility] Office  [] [address] [place] [address]  [] Home  [] Other:  televisit    Date of Initial Visit: 2/19/19   Referral from: Erick Barrientos MD  Primary Care Physician: Solis Chaudhry MD      SUMMARY:   Glenn Santillan is a 42y.o. year old with high-grade follicular lymphoma, who was referred to Palliative Medicine by Dr. Cookie Tripathi for symptom management and supportive care. He was diagnosed in 11/2018 after presenting with back pain, treated with systemic chemotherapy with complete response. He suffered cardiac arrest during cycle #3 due to previously undiagnosed severe stenosis of the LAD s/p PTCA and stent. He was diagnosed with poorly differentiated carcinoma of the colon (no evidence of metastatic disease) in 2/14/22 and underwent loop ileostomy at [facility] on 2/19/22. He underwent exploratory laparoscopy in 4/2022 which revealed a large tumor at the hepatic flexure peritoneal carcinomatosis. He is currently being treated with systemic chemotherapy under the care of Dr. Elizabeth Lee. The patients social history includes: he is single. He lives in [place] with his girlfriend, Manas Kaminski, and their 12-month old son. Kait Orlando He has 3 teenage children who live with their mother and with whom he has close contact. 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 Palliative Medicine is addressing the following current patient/family concerns: abdominal pain related to malignancy; anxiety, depression; left mid- and low back pain, poor appetite, fatigue, advanced care planning. Initial Referral Intake note from Ana Sullivan RN reviewed prior to visit   PALLIATIVE DIAGNOSES:       ICD-10-CM ICD-9-CM    1.  Abdominal pain, generalized  R10.84 789.07 oxyCODONE IR (ROXICODONE) 10 mg tab immediate release tablet      oxyCODONE ER (OxyCONTIN) 10 mg ER tablet      oxyCODONE IR (ROXICODONE) 10 mg tab immediate release tablet   2. Right arm pain  M79.601 729.5    3. Anxiety  F41.9 300.00    4. Reactive depression  F32.9 300.4    5. Poor appetite  R63.0 783.0    6. Nausea without vomiting  R11.0 787.02    7. Other fatigue  R53.83 780.79    8. Colon adenocarcinoma (HCC)  C18.9 153.9 oxyCODONE IR (ROXICODONE) 10 mg tab immediate release tablet      oxyCODONE ER (OxyCONTIN) 10 mg ER tablet      oxyCODONE IR (ROXICODONE) 10 mg tab immediate release tablet   9. Peritoneal carcinomatosis (HCC)  C78.6 197.6 oxyCODONE IR (ROXICODONE) 10 mg tab immediate release tablet      oxyCODONE ER (OxyCONTIN) 10 mg ER tablet      oxyCODONE IR (ROXICODONE) 10 mg tab immediate release tablet          PLAN:   Patient Instructions     Dear Vicenta Nix. ,    It was a pleasure seeing you today in [facility]. We will see you again in 5 to 6 weeks for an office visit to coordinate with your infusion. If labs or imaging tests have been ordered for you today, please call the office  at [phone] 48 hours after completion to obtain the results. Your described symptoms were: Fatigue: 4 Drowsiness: 4   Depression: 1 Pain: 5   Anxiety: 8 Nausea: 0   Anorexia: 4 Dyspnea: 1   Best Well-Bein Constipation: No   Other Problem (Comment): 0       This is the plan we talked about:    1. Abdominal pain  -Start extended-release oxycodone 10-mg every 12 hours  -Continue oxycodone 10-mg every 4 hours as needed    2. Right arm pain  -The etiology (cause) of this pain is unclear at this point  -Please call if the pain increases in intensity, persists, if you develop muscle weakness or any other acute symptom such as acute shortness of breath, chest pain or pressure, or dizziness    3.  Depression/anxiety  -You have chronic anxiety which is unchanged  -Continue Lexapro 20-mg daily  -I'm avoiding benzodiazepines due to synergistic effect with opioids  -Today you me, our , Iggy Au, who is available to you for supportive counseling    4. Poor appetite/weight loss  -Continue eating your one good meal a day  -Try to eat smaller amounts of food throughout the day (4 to 5 times) to keep up with your nutritional needs  -You've been maintaining your weight in the 129-132 pound range for the past 5 weeks    5. Fatigue  -This is chronic and unchanged     6. Nausea  -Continue ondansetron 8-mg every 8 hours as needed  -Continue prochlorperazine 10-mg every 6 hours as needed    7. Colon mass  -You're receiving chemotherapy under the care of Dr. Kimberly Chavez    This is what you have shared with us about Advance Care Planning:      Primary Decision Maker: Zain De La Fuenteienangelique - [phone]  Advance Care Planning 6/20/2022   Patient's Healthcare Decision Maker is: Legal Next of Kin   Confirm Advance Directive -   Patient Would Like to Complete Advance Directive -   Does the patient have other document types -           The Palliative Medicine Team is here to support you and your family.        Sincerely,      Nancy David MD and the Palliative Medicine Team       GOALS OF CARE / TREATMENT PREFERENCES:   [====Goals of Care====]  GOALS OF CARE:  Patient / health care proxy stated goals: See Patient Instructions / Summary    TREATMENT PREFERENCES:   Code Status:  [x] Attempt Resuscitation       [] Do Not Attempt Resuscitation    Advance Care Planning:  [x] The [facility] Interdisciplinary Team has updated the ACP Navigator with Decision Maker and Patient Capacity      Primary Decision Maker: Zain De La Fuenteienangelique - [phone]    [] Named in a scanned document   [x] Legal Next of Kin  [] Guardian    Other:  (If patient appropriate for POST, consider using PALLPOST smart phrase here)    The palliative care team has discussed with patient / health care proxy about goals of care / treatment preferences for patient.  [====Goals of Care====]     PRESCRIPTIONS GIVEN:     Medications Ordered Today   Medications    oxyCODONE IR (ROXICODONE) 10 mg tab immediate release tablet     Sig: Take 1 Tablet by mouth every four (4) hours as needed for Pain for up to 15 days. Max Daily Amount: 60 mg. Dispense:  90 Tablet     Refill:  0    oxyCODONE ER (OxyCONTIN) 10 mg ER tablet     Sig: Take 1 Tablet by mouth every twelve (12) hours for 30 days. Max Daily Amount: 20 mg. Dispense:  60 Tablet     Refill:  0    oxyCODONE IR (ROXICODONE) 10 mg tab immediate release tablet     Sig: Take 1 Tablet by mouth every four (4) hours as needed for Pain for up to 15 days. Max Daily Amount: 60 mg. Dispense:  90 Tablet     Refill:  0           FOLLOW UP:     Future Appointments   Date Time Provider Dahlia Epps   6/22/2022  2:00 PM SS INF4 CH4 <1H [facility]   6/27/2022 10:00 AM Lazara Coon, KARYNM RPP [facility]   7/5/2022  7:30 AM SS INF3 CH2 >7H [facility]   7/5/2022  8:15 AM Esdras Blake, RODY [facility]   7/7/2022  2:00 PM SS INF4 CH4 <1H [facility]   7/18/2022  7:30 AM SS INF2 CH2 >7H [facility]   7/20/2022  2:00 PM SS INF5 CH4 <1H [facility]           PHYSICIANS INVOLVED IN CARE:   Patient Care Team:  Malgorzata Light MD as PCP - General (Family Medicine)  Christina Asencio MD (Hematology and Oncology)  Yaniv Morales MD as Physician (Palliative Medicine)  Yaniv Morales MD as Physician (Palliative Medicine)       HISTORY:   Reviewed patient-completed ESAS and advance care planning form. Reviewed patient record in prescription monitoring program.    CHIEF COMPLAINT:   Chief Complaint   Patient presents with    Abdominal Pain       HPI/SUBJECTIVE:    The patient is: [x] Verbal / [] Nonverbal     He's having less pain in the morning when he wakes up after starting OxyContin. He takes 4-6 immediate-release oxycodone/24 hours.     He's been having a shooting pain in his right arm when he yawns. This only happens for a few days after chemo. He's had no arm or hand weakness. He's had no chest pain or pressure. He carries his pump over his left shoulder. See Plan/Patient Instructions for additional interval history      From visit 3/2/22:  He started having abdominal pain about a month ago. Then he started feeling nauseous. He had trouble with bowel movements, feeling like he wasn't completing emptying his bowels. He went to the ED on 2/14, CT scan showed mass in his colon. He was hospitalized at [address] [address] 2/15-2/25 for colonoscopy and loop ileostomy. He's still having pain in his abdomen. He's been taking 2 oxycodone every 4 to 6 hours which eases the pain to ~5/10 which is tolerable. He's always anxious. He continues to take Lexapro. He's eating, not as much as he used to. He ate 2 PBJs yesterday. He had mild nausea for a few days after he came home. He's passing formed stool in his ostomy bag daily. Home health is coming to his home. He sees Dr. Little Villegas next week. From IV 2/19/19:  He has a lot of anxiety. He's lived with anxiety for a long time, sometimes it's been worse than others, like when he lost his job and lost his insurance. He took Wellbutrin then which made him more anxious and depressed. He's had more anxiety since he was diagnosed with lymphoma. He's been taking lorazepam and it doesn't help at all, even when he tried taking 2 pills. This is his biggest problem now. He feels depressed but it's not as bad as the anxiety.     He has pain in his back, that's what brought him to the hospital in November. He's been taking oxycodone which helps some. The groin pain started after his operation (cardiac cath) in the hospital last week. He expects that will get better as it heals. His pain doesn't bother him too much, not like the anxiety.     His appetite is getting better.  He's lost ~30# since last fall but that's leveled off now.     He's moving his bowels regularly.     He sometimes gets short of breath but not as much as used to.     He doesn't have chest pain, never did.     He sleeps well at night. He doesn't have the energy to do much during the day.     He lives with his father. He sees his three teen-age children frequently (they live with their mother). His children give him a lot of strength.         Clinical Pain Assessment (nonverbal scale for nonverbal patients):   [++++ Clinical Pain Assessment++++]  [++++Pain Severity++++]: Pain: 5  [++++Pain Character++++]: stabbing pain in back  [++++Pain Duration++++]: months for back pain, weeks for groin pain  [++++Pain Effect++++]: little  [++++Pain Factors++++]: oxycodone helps with back pain, groin pain elicited by standing and walking  [++++Pain Frequency++++]: constant back pain with varying intensity  [++++Pain Location++++]: left lower back  [++++ Clinical Pain Assessment++++]    [++++ Clinical Pain Assessment++++]  [++++Pain Severity++++]: Pain: 5  [++++Pain Character++++]: deep, aching  [++++Pain Duration++++]: since 2/2022  [++++Pain Effect++++]: limits function, emotional distress  [++++Pain Factors++++]: unable to identify provoking factors  [++++Pain Frequency++++]: constant  [++++Pain Location++++]: right lower abdomen  [++++ Clinical Pain Assessment++++]         FUNCTIONAL ASSESSMENT:     Palliative Performance Scale (PPS):  PPS: 70       PSYCHOSOCIAL/SPIRITUAL SCREENING:     Any spiritual / Temple concerns:  [] Yes /  [x] No    Caregiver Burnout:  [] Yes /  [x] No /  [] No Caregiver Present      Anticipatory grief assessment:   [x] Normal  / [] Maladaptive       ESAS Anxiety: Anxiety: 8    ESAS Depression: Depression: 1       REVIEW OF SYSTEMS:     The following systems were [x] reviewed / [] unable to be reviewed  Systems: constitutional, ears/nose/mouth/throat, respiratory, gastrointestinal, genitourinary, musculoskeletal, integumentary, neurologic, psychiatric, endocrine. Positive findings noted below. Modified ESAS Completed by: provider   Fatigue: 4 Drowsiness: 4   Depression: 1 Pain: 5   Anxiety: 8 Nausea: 0   Anorexia: 4 Dyspnea: 1   Best Well-Bein Constipation: No   Other Problem (Comment): 0          PHYSICAL EXAM:     Wt Readings from Last 3 Encounters:   22 131 lb 9.6 oz (59.7 kg)   22 129 lb 12.8 oz (58.9 kg)   22 129 lb 12.8 oz (58.9 kg)     There were no vitals taken for this visit.   Last bowel movement: See Nursing Note         Constitutional: appears fatigued, ill  Eyes: pupils equal, anicteric  ENMT: no nasal discharge, moist mucous membranes  Cardiovascular: regular rhythm, distal pulses intact; right ulnar and radial pulses intact  Respiratory: breathing not labored, symmetric  Gastrointestinal: soft non-tender, +bowel sounds  Musculoskeletal: no deformity, no tenderness to palpation  Skin: warm, dry  Neurologic: following commands, moving all extremities  Psychiatric: full affect, no hallucinations  Other:       HISTORY:     Past Medical History:   Diagnosis Date    Anxiety     Anxiety and depression     Cancer ([address]     lymphoma 2018 receiving chemo    Cancer ([address] 2022    COLON    Chronic pain     lower back- lymphoma    Hyperlipidemia     Hypertension     NO MEDICATION FOR PAST 9 MONTHS    Lymphadenopathy 2018    Seizures ([address] CHILDHOOD    NEVER TOOK MEDICATION - \"GREW OUT OF THEM\"      Past Surgical History:   Procedure Laterality Date    HX COLONOSCOPY      HX HEART CATHETERIZATION  2019    HX ILEOSTOMY      HX OTHER SURGICAL  2019    cardiac stent    IR INJECTION PSEUDOANEURYSM  2019    AL ABDOMEN SURGERY PROC UNLISTED  2022    COLON RESECTION WITH ILEOSTOMY    AL CARDIAC SURG PROCEDURE UNLIST      STENT X1      Family History   Problem Relation Age of Onset    Hypertension Father     Diabetes Father     Cancer Father         PROSTATE    Diabetes Mother     No Known Problems Brother     No Known Problems Brother     Anesth Problems Neg Hx       History reviewed, no pertinent family history. Social History     Tobacco Use    Smoking status: Current Every Day Smoker     Packs/day: 0.50     Years: 20.00     Pack years: 10.00    Smokeless tobacco: Never Used    Tobacco comment: \"STOP SMOKING\" PACKET GIVEN TO PATIENT   Substance Use Topics    Alcohol use: No     No Known Allergies   Current Outpatient Medications   Medication Sig    [START ON 6/28/2022] oxyCODONE IR (ROXICODONE) 10 mg tab immediate release tablet Take 1 Tablet by mouth every four (4) hours as needed for Pain for up to 15 days. Max Daily Amount: 60 mg.    [START ON 6/24/2022] oxyCODONE ER (OxyCONTIN) 10 mg ER tablet Take 1 Tablet by mouth every twelve (12) hours for 30 days. Max Daily Amount: 20 mg.    [START ON 7/13/2022] oxyCODONE IR (ROXICODONE) 10 mg tab immediate release tablet Take 1 Tablet by mouth every four (4) hours as needed for Pain for up to 15 days. Max Daily Amount: 60 mg.    ondansetron hcl (ZOFRAN) 8 mg tablet Take 1 Tablet by mouth every eight (8) hours as needed for Nausea or Vomiting.  lidocaine-prilocaine (EMLA) topical cream Apply a dime size amount to port site 30 minutes before treatment to prevent pain.  dexAMETHasone (DECADRON) 4 mg tablet Take 8mg (2 x tabs) on days 2 and 3 after treatment to prevent nausea. Take in the AM with food.  multivitamin (ONE A DAY) tablet Take 1 Tablet by mouth daily.  atorvastatin (LIPITOR) 40 mg tablet TAKE 1 TAB BY MOUTH NIGHTLY. INDICATIONS: TREATMENT TO SLOW PROGRESSION OF CORONARY ARTERY DISEASE    docusate sodium (COLACE) 100 mg capsule Take 1 Capsule by mouth two (2) times a day for 90 days. (Patient not taking: Reported on 5/25/2022)    loperamide (Imodium A-D) 2 mg capsule Take 1 Capsule by mouth four (4) times daily as needed for Diarrhea.  (Patient not taking: Reported on 5/25/2022)    prochlorperazine (Compazine) 10 mg tablet Take 1 Tablet by mouth every six (6) hours as needed for Nausea or Vomiting. (Patient not taking: Reported on 6/20/2022)    senna-docusate (PERICOLACE) 8.6-50 mg per tablet Take 1 Tablet by mouth daily as needed for Constipation. (Patient not taking: Reported on 5/25/2022)     No current facility-administered medications for this visit.      Facility-Administered Medications Ordered in Other Visits   Medication Dose Route Frequency    0.9% sodium chloride infusion  25 mL/hr IntraVENous CONTINUOUS    dextrose 5% infusion  25 mL/hr IntraVENous CONTINUOUS    atropine 0.4 mg/mL injection 0.4 mg  0.4 mg SubCUTAneous Q2H PRN    irinotecan (CAMPTOSAR) 281 mg in dextrose 5% 250 mL, overfill volume 25 mL chemo infusion  165 mg/m2 (Order-Specific) IntraVENous ONCE    oxaliplatin (ELOXATIN) 144.5 mg in dextrose 5% 250 mL, overfill volume 25 mL chemo infusion  85 mg/m2 (Order-Specific) IntraVENous ONCE    leucovorin (WELLCOVORIN) 680 mg in dextrose 5% 250 mL, overfill volume 25 mL IVPB  400 mg/m2 (Order-Specific) IntraVENous ONCE    fluorouraciL (ADRUCIL) 4,080 mg in 0.9% sodium chloride 100 mL CADD Cassette  2,400 mg/m2 (Order-Specific) IntraVENous ONCE    sodium chloride (NS) flush 10 mL  10 mL IntraVENous PRN    0.9% sodium chloride injection 10 mL  10 mL IntraVENous PRN    heparin (porcine) pf 300-500 Units  300-500 Units InterCATHeter PRN          LAB DATA REVIEWED:     Lab Results   Component Value Date/Time    WBC 4.8 06/20/2022 08:15 AM    HGB 11.4 (L) 06/20/2022 08:15 AM    PLATELET 033 47/25/1174 08:15 AM     Lab Results   Component Value Date/Time    Sodium 142 06/20/2022 08:15 AM    Potassium 3.4 (L) 06/20/2022 08:15 AM    Chloride 108 06/20/2022 08:15 AM    CO2 24 06/20/2022 08:15 AM    BUN 8 06/20/2022 08:15 AM    Creatinine 1.04 06/20/2022 08:15 AM    Calcium 9.5 06/20/2022 08:15 AM    Magnesium 1.7 01/12/2019 04:05 AM    Phosphorus 2.0 (L) 01/12/2019 04:05 AM      Lab Results   Component Value Date/Time    Alk. phosphatase 128 (H) 06/20/2022 08:15 AM    Protein, total 7.0 06/20/2022 08:15 AM    Albumin 2.9 (L) 06/20/2022 08:15 AM    Globulin 4.1 (H) 06/20/2022 08:15 AM     Lab Results   Component Value Date/Time    INR 1.1 02/22/2019 08:18 PM    Prothrombin time 10.8 02/22/2019 08:18 PM    aPTT 27.8 02/22/2019 08:18 PM      Lab Results   Component Value Date/Time    Iron 18 (L) 05/06/2022 11:13 AM    TIBC 173 (L) 05/06/2022 11:13 AM    Iron % saturation 10 (L) 05/06/2022 11:13 AM    Ferritin 426 (H) 05/06/2022 11:13 AM          MRI lumbar spine 12/18/19:  Congenital narrowing of the lumbar canal. Vertebral body heights are maintained. Marrow signal is normal.     The conus medullaris terminates at T12/L1. Signal and caliber of the distal  spinal cord are within normal limits. There is no pathologic intrathecal  enhancement.     The paraspinal soft tissues are within normal limits.     Lower thoracic spine: No herniation or stenosis.     L1-L2: No herniation or stenosis. L2-L3: No herniation or stenosis. L3-L4: Mild facet arthropathy. Minimal central disc protrusion. Mild canal  stenosis. Foramina are patent  L4-L5: Desiccation. Mild facet arthropathy. Canal demonstrates minimal stenosis. There is an annular ligament tear far laterally on the left. Mild left foraminal  stenosis. L5-S1: Mild facet arthropathy. Canal and foramina are patent. IMPRESSION:  Mild disc degenerative change at L3-4 and L4-5. Mild canal stenosis at L3-4 and mild left foraminal stenosis at L4-5. Other less severe degenerative findings are as described above. CT chest/abdomen 12/16/19:  THYROID: No nodule. MEDIASTINUM: No mass or lymphadenopathy. Port catheter in place on the right. Catheter tip in appropriate position. SB: No mass or lymphadenopathy. THORACIC AORTA: No dissection or aneurysm. MAIN PULMONARY ARTERY: Unremarkable  TRACHEA/BRONCHI: Unremarkable  ESOPHAGUS: No wall thickening or dilatation.   HEART: Normal in size. PLEURA: No effusion or pneumothorax. LUNGS: Bibasilar atelectasis is noted. Justina Sida LIVER: No mass or biliary dilatation. GALLBLADDER: Layering contrast versus cholelithiasis. SPLEEN: No mass. PANCREAS: No mass or ductal dilatation. ADRENALS: The left adrenal gland is elevated by the retroperitoneal mass. The    right is unremarkable. KIDNEYS: There is diminished soft tissue density at the level of the left renal  hilum. There is increased left renal cortical atrophy. STOMACH: Unremarkable. SMALL BOWEL: No dilatation or wall thickening. COLON: No dilatation or wall thickening. APPENDIX: Unremarkable. PERITONEUM: No ascites or pneumoperitoneum. RETROPERITONEUM:   Diminished size of retroperitoneal mass. Diminished in size with margins difficult to fully ascertain. 2-62 35 x 50 mm previously 71 x 94 mm.     2-67 left periaortic adenopathy 25 x 17 mm  The SMA, celiac, and LIZZY are remain encased, diminished attenuation of these  vessels.      REPRODUCTIVE ORGANS: The prostate and seminal vesicles are unremarkable. URINARY  BLADDER: Unremarkable  BONES: No destructive bone lesion. ADDITIONAL COMMENTS: Resolved left inguinal pseudoaneurysm. .       IMPRESSION:    Baseline research examination. Findings are consistent with interval response to therapy. Continued diminished size of retroperitoneal mass-adenopathy,  with diminished soft tissue density at the left renal hilum. CT chest/abdomen/pelvis 2/14/22:  1. Annular mass lesion of the right colon with upstream fecal retention  concerning for primary colon neoplasm versus less likely lymphoma. 2. Soft tissue stranding around celiac axis, SMA, and abdominal aorta may  reflect infiltrative lymphoma. 3. Left renal atrophy with left hydronephrosis likely reflecting chronic  proximal ureteral obstruction. 4. Incidentals as above.       CONTROLLED SUBSTANCES SAFETY ASSESSMENT (IF ON CONTROLLED SUBSTANCES): Reviewed opioid safety handout:  [x] Yes   [] No  24 hour opioid dose >150mg morphine equivalent/day:  [] Yes   [x] No  Benzodiazepines:  [x] Yes   [] No  Sleep apnea:  [] Yes   [x] No  Urine Toxicology Testing within last 6 months:  [] Yes   [x] No  History of or new aberrant medication taking behaviors:  [] Yes   [x] No  Has Narcan been prescribed [x] Yes   [] No          Total time:   Counseling / coordination time:   > 50% counseling / coordination?:

## 2022-06-20 ENCOUNTER — OFFICE VISIT (OUTPATIENT)
Dept: PALLATIVE CARE | Age: 47
End: 2022-06-20

## 2022-06-20 ENCOUNTER — HOSPITAL ENCOUNTER (OUTPATIENT)
Dept: INFUSION THERAPY | Age: 47
Discharge: HOME | End: 2022-06-20
Payer: COMMERCIAL

## 2022-06-20 ENCOUNTER — DOCUMENTATION ONLY (OUTPATIENT)
Dept: PALLATIVE CARE | Age: 47
End: 2022-06-20

## 2022-06-20 VITALS
HEIGHT: 67 IN | BODY MASS INDEX: 20.65 KG/M2 | WEIGHT: 131.6 LBS | HEART RATE: 63 BPM | OXYGEN SATURATION: 99 % | SYSTOLIC BLOOD PRESSURE: 134 MMHG | DIASTOLIC BLOOD PRESSURE: 87 MMHG | RESPIRATION RATE: 18 BRPM | TEMPERATURE: 97.7 F

## 2022-06-20 DIAGNOSIS — C18.9 COLON ADENOCARCINOMA (HCC): Primary | ICD-10-CM

## 2022-06-20 DIAGNOSIS — R11.0 NAUSEA WITHOUT VOMITING: ICD-10-CM

## 2022-06-20 DIAGNOSIS — D70.1 CHEMOTHERAPY INDUCED NEUTROPENIA (HCC): ICD-10-CM

## 2022-06-20 DIAGNOSIS — C18.9 COLON ADENOCARCINOMA (HCC): ICD-10-CM

## 2022-06-20 DIAGNOSIS — T45.1X5A CHEMOTHERAPY INDUCED NEUTROPENIA (HCC): ICD-10-CM

## 2022-06-20 DIAGNOSIS — R63.0 POOR APPETITE: ICD-10-CM

## 2022-06-20 DIAGNOSIS — R53.83 OTHER FATIGUE: ICD-10-CM

## 2022-06-20 DIAGNOSIS — F41.9 ANXIETY: ICD-10-CM

## 2022-06-20 DIAGNOSIS — F32.9 REACTIVE DEPRESSION: ICD-10-CM

## 2022-06-20 DIAGNOSIS — C78.6 PERITONEAL CARCINOMATOSIS (HCC): ICD-10-CM

## 2022-06-20 DIAGNOSIS — C82.90 FOLLICULAR LYMPHOMA, UNSPECIFIED FOLLICULAR LYMPHOMA TYPE, UNSPECIFIED BODY REGION (HCC): ICD-10-CM

## 2022-06-20 DIAGNOSIS — Z76.89 PREVENTION OF CHEMOTHERAPY-INDUCED NEUTROPENIA: ICD-10-CM

## 2022-06-20 DIAGNOSIS — R10.84 ABDOMINAL PAIN, GENERALIZED: Primary | ICD-10-CM

## 2022-06-20 DIAGNOSIS — M79.601 RIGHT ARM PAIN: ICD-10-CM

## 2022-06-20 LAB
ALBUMIN SERPL-MCNC: 2.9 G/DL (ref 3.5–5)
ALBUMIN/GLOB SERPL: 0.7 {RATIO} (ref 1.1–2.2)
ALP SERPL-CCNC: 128 U/L (ref 45–117)
ALT SERPL-CCNC: 23 U/L (ref 12–78)
ANION GAP SERPL CALC-SCNC: 10 MMOL/L (ref 5–15)
AST SERPL-CCNC: 17 U/L (ref 15–37)
BASOPHILS # BLD: 0 K/UL (ref 0–0.1)
BASOPHILS NFR BLD: 0 % (ref 0–1)
BILIRUB SERPL-MCNC: 0.2 MG/DL (ref 0.2–1)
BUN SERPL-MCNC: 8 MG/DL (ref 6–20)
BUN/CREAT SERPL: 8 (ref 12–20)
CALCIUM SERPL-MCNC: 9.5 MG/DL (ref 8.5–10.1)
CHLORIDE SERPL-SCNC: 108 MMOL/L (ref 97–108)
CO2 SERPL-SCNC: 24 MMOL/L (ref 21–32)
CREAT SERPL-MCNC: 1.04 MG/DL (ref 0.7–1.3)
DIFFERENTIAL METHOD BLD: ABNORMAL
EOSINOPHIL # BLD: 0 K/UL (ref 0–0.4)
EOSINOPHIL NFR BLD: 1 % (ref 0–7)
ERYTHROCYTE [DISTWIDTH] IN BLOOD BY AUTOMATED COUNT: 21.7 % (ref 11.5–14.5)
GLOBULIN SER CALC-MCNC: 4.1 G/DL (ref 2–4)
GLUCOSE SERPL-MCNC: 129 MG/DL (ref 65–100)
HCT VFR BLD AUTO: 36.4 % (ref 36.6–50.3)
HGB BLD-MCNC: 11.4 G/DL (ref 12.1–17)
IMM GRANULOCYTES # BLD AUTO: 0 K/UL
IMM GRANULOCYTES NFR BLD AUTO: 0 %
LYMPHOCYTES # BLD: 1.7 K/UL (ref 0.8–3.5)
LYMPHOCYTES NFR BLD: 36 % (ref 12–49)
MCH RBC QN AUTO: 25.7 PG (ref 26–34)
MCHC RBC AUTO-ENTMCNC: 31.3 G/DL (ref 30–36.5)
MCV RBC AUTO: 82.2 FL (ref 80–99)
MONOCYTES # BLD: 0.5 K/UL (ref 0–1)
MONOCYTES NFR BLD: 11 % (ref 5–13)
NEUTS BAND NFR BLD MANUAL: 3 % (ref 0–6)
NEUTS SEG # BLD: 2.6 K/UL (ref 1.8–8)
NEUTS SEG NFR BLD: 49 % (ref 32–75)
NRBC # BLD: 0 K/UL (ref 0–0.01)
NRBC BLD-RTO: 0 PER 100 WBC
PLATELET # BLD AUTO: 309 K/UL (ref 150–400)
PMV BLD AUTO: 9.6 FL (ref 8.9–12.9)
POTASSIUM SERPL-SCNC: 3.4 MMOL/L (ref 3.5–5.1)
PROT SERPL-MCNC: 7 G/DL (ref 6.4–8.2)
RBC # BLD AUTO: 4.43 M/UL (ref 4.1–5.7)
RBC MORPH BLD: ABNORMAL
RBC MORPH BLD: ABNORMAL
SODIUM SERPL-SCNC: 142 MMOL/L (ref 136–145)
WBC # BLD AUTO: 4.8 K/UL (ref 4.1–11.1)

## 2022-06-20 PROCEDURE — 99214 OFFICE O/P EST MOD 30 MIN: CPT | Performed by: INTERNAL MEDICINE

## 2022-06-20 PROCEDURE — 36415 COLL VENOUS BLD VENIPUNCTURE: CPT

## 2022-06-20 PROCEDURE — 96368 THER/DIAG CONCURRENT INF: CPT

## 2022-06-20 PROCEDURE — 96415 CHEMO IV INFUSION ADDL HR: CPT

## 2022-06-20 PROCEDURE — 85025 COMPLETE CBC W/AUTO DIFF WBC: CPT

## 2022-06-20 PROCEDURE — 74011250637 HC RX REV CODE- 250/637: Performed by: NURSE PRACTITIONER

## 2022-06-20 PROCEDURE — 96375 TX/PRO/DX INJ NEW DRUG ADDON: CPT

## 2022-06-20 PROCEDURE — 74011250636 HC RX REV CODE- 250/636: Performed by: NURSE PRACTITIONER

## 2022-06-20 PROCEDURE — 96413 CHEMO IV INFUSION 1 HR: CPT

## 2022-06-20 PROCEDURE — 80053 COMPREHEN METABOLIC PANEL: CPT

## 2022-06-20 PROCEDURE — 74011000258 HC RX REV CODE- 258: Performed by: NURSE PRACTITIONER

## 2022-06-20 PROCEDURE — 96417 CHEMO IV INFUS EACH ADDL SEQ: CPT

## 2022-06-20 PROCEDURE — 96416 CHEMO PROLONG INFUSE W/PUMP: CPT

## 2022-06-20 PROCEDURE — 77030016057 HC NDL HUBR APOL -B

## 2022-06-20 RX ORDER — POTASSIUM CHLORIDE 750 MG/1
30 TABLET, FILM COATED, EXTENDED RELEASE ORAL
Status: COMPLETED | OUTPATIENT
Start: 2022-06-20 | End: 2022-06-20

## 2022-06-20 RX ORDER — PALONOSETRON 0.05 MG/ML
0.25 INJECTION, SOLUTION INTRAVENOUS ONCE
Status: COMPLETED | OUTPATIENT
Start: 2022-06-20 | End: 2022-06-20

## 2022-06-20 RX ORDER — ATROPINE SULFATE 0.4 MG/ML
0.4 INJECTION, SOLUTION ENDOTRACHEAL; INTRAMEDULLARY; INTRAMUSCULAR; INTRAVENOUS; SUBCUTANEOUS
Status: ACTIVE | OUTPATIENT
Start: 2022-06-20 | End: 2022-06-20

## 2022-06-20 RX ORDER — SODIUM CHLORIDE 0.9 % (FLUSH) 0.9 %
10 SYRINGE (ML) INJECTION AS NEEDED
Status: DISPENSED | OUTPATIENT
Start: 2022-06-20 | End: 2022-06-20

## 2022-06-20 RX ORDER — OXYCODONE HYDROCHLORIDE 10 MG/1
10 TABLET ORAL
Qty: 90 TABLET | Refills: 0 | Status: SHIPPED | OUTPATIENT
Start: 2022-06-28 | End: 2022-07-05 | Stop reason: SDUPTHER

## 2022-06-20 RX ORDER — DEXTROSE MONOHYDRATE 50 MG/ML
25 INJECTION, SOLUTION INTRAVENOUS CONTINUOUS
Status: DISPENSED | OUTPATIENT
Start: 2022-06-20 | End: 2022-06-20

## 2022-06-20 RX ORDER — HEPARIN 100 UNIT/ML
300-500 SYRINGE INTRAVENOUS AS NEEDED
Status: ACTIVE | OUTPATIENT
Start: 2022-06-20 | End: 2022-06-20

## 2022-06-20 RX ORDER — SODIUM CHLORIDE 9 MG/ML
25 INJECTION, SOLUTION INTRAVENOUS CONTINUOUS
Status: DISCONTINUED | OUTPATIENT
Start: 2022-06-20 | End: 2022-06-22 | Stop reason: HOSPADM

## 2022-06-20 RX ORDER — OXYCODONE HYDROCHLORIDE 10 MG/1
10 TABLET ORAL
Qty: 90 TABLET | Refills: 0 | Status: SHIPPED | OUTPATIENT
Start: 2022-07-13 | End: 2022-07-05 | Stop reason: SDUPTHER

## 2022-06-20 RX ORDER — OXYCODONE HCL 10 MG/1
10 TABLET, FILM COATED, EXTENDED RELEASE ORAL EVERY 12 HOURS
Qty: 60 TABLET | Refills: 0 | Status: SHIPPED | OUTPATIENT
Start: 2022-06-24 | End: 2022-07-24

## 2022-06-20 RX ORDER — SODIUM CHLORIDE 9 MG/ML
10 INJECTION INTRAMUSCULAR; INTRAVENOUS; SUBCUTANEOUS AS NEEDED
Status: ACTIVE | OUTPATIENT
Start: 2022-06-20 | End: 2022-06-20

## 2022-06-20 RX ADMIN — POTASSIUM CHLORIDE 30 MEQ: 750 TABLET, EXTENDED RELEASE ORAL at 10:51

## 2022-06-20 RX ADMIN — OXALIPLATIN 144.5 MG: 5 INJECTION, SOLUTION INTRAVENOUS at 12:24

## 2022-06-20 RX ADMIN — FLUOROURACIL 4080 MG: 50 INJECTION, SOLUTION INTRAVENOUS at 14:44

## 2022-06-20 RX ADMIN — DEXTROSE MONOHYDRATE 25 ML/HR: 50 INJECTION, SOLUTION INTRAVENOUS at 09:40

## 2022-06-20 RX ADMIN — LEUCOVORIN CALCIUM 680 MG: 200 INJECTION, POWDER, LYOPHILIZED, FOR SUSPENSION INTRAMUSCULAR; INTRAVENOUS at 12:24

## 2022-06-20 RX ADMIN — DEXAMETHASONE SODIUM PHOSPHATE 12 MG: 4 INJECTION, SOLUTION INTRA-ARTICULAR; INTRALESIONAL; INTRAMUSCULAR; INTRAVENOUS; SOFT TISSUE at 09:40

## 2022-06-20 RX ADMIN — DEXTROSE MONOHYDRATE 281 MG: 5 INJECTION, SOLUTION INTRAVENOUS at 10:46

## 2022-06-20 RX ADMIN — PALONOSETRON 0.25 MG: 0.05 INJECTION, SOLUTION INTRAVENOUS at 09:40

## 2022-06-20 NOTE — PROGRESS NOTES
Palliative Medicine Office Visit  Palliative Medicine Nurse Check In  [phone]    Patient Name: Bill Duarte. YOB: 1975      Date of Office Visit: 6/20/2022    Patient states: \"  \"    From Check In Sheet (scanned in Media):  Has a medical provider talked with you about cardiopulmonary resuscitation (CPR)? [x] Yes   [] No   [] Unable to obtain    Nurse reminder to complete or update ACP FlowSheet:    Is ACP on the Problem List?    [] Yes    [x] No  IF ACP Document is ON FILE; Nurse to place ACP on Problem List     Is there an ACP Note in Chart Review/Note? [x] Yes    [] No   If NO: ALERT PROVIDER       Primary Decision Maker: Paulina Amrit Cris Girlfrienangelique - [phone]  Advance Care Planning 6/20/2022   Patient's Healthcare Decision Maker is: Legal Next of Kin   Confirm Advance Directive -   Patient Would Like to Complete Advance Directive -   Does the patient have other document types -       Is there anything that we should know about you as a person in order to provide you the best care possible? Have you been to the ER, urgent care clinic since your last visit? [] Yes   [x] No   [] Unable to obtain    Have you been hospitalized since your last visit? [] Yes   [x] No   [] Unable to obtain    Have you seen or consulted any other health care providers outside of the [address] [place] [address] since your last visit? [] Yes   [x] No   [] Unable to obtain    Functional status (describe):         Last BM: 6/202/2022     accessed (date):      Bottle review (for opioid pain medication):  Medication 1:   Date filled:   Directions:   # filled:   # left:   # pills taking per day:  Last dose taken:    Medication 2:   Date filled:   Directions:   # filled:   # left:   # pills taking per day:  Last dose taken:    Medication 3:   Date filled:   Directions:   # filled:   # left:   # pills taking per day:  Last dose taken:    Medication 4:   Date filled:   Directions:   # filled:   # left:   # pills taking per day:  Last dose taken:

## 2022-06-20 NOTE — PROGRESS NOTES
Palliative Medicine [facility] clinic    Outpatient Social Work Visit                                                  [phone]      Presentation: Prem Flores is a 41 yo. white male with high-grade follicular lymphoma, who was referred to Palliative Medicine by Dr. Seferino Evangelista for symptom management and supportive care. He was diagnosed in 11/2018 after presenting with back pain, treated with systemic chemotherapy with complete response. He suffered cardiac arrest during cycle #3 due to previously undiagnosed severe stenosis of the LAD s/p PTCA and stent. He was diagnosed with poorly differentiated carcinoma of the colon (no evidence of metastatic disease) in 2/14/22 and underwent loop ileostomy at [facility] on 2/19/22. He underwent exploratory laparoscopy in 4/2022 which revealed a large tumor at the hepatic flexure peritoneal carcinomatosis. He is currently being treated with systemic chemotherapy under the care of Dr. Evon Cruz. Palliative Medicine is addressing the following current patient/family concerns: abdominal pain related to malignancy; anxiety, depression; left mid- and low back pain, poor appetite, fatigue, advanced care planning. (As per chart review)    This LCSW met with Mr. Tamara Mejia with Dr. Umberto Soloiro in the [facility] today. After the patients appointment with Dr. Umberto Solorio, this LCSW introduced self and role with Palliative Medicine. Mr. Tamara Mejia is alert , oriented and was engaged during the visit. He is soft spoken. Mood is appropriate, thoughts - linear and logical.     Discussed the opportunity to use the LCSW as a layer of support and for psychosocial support services. Social History: Mr. Tamara Mejia reports he lives at  home with his girlfriend, Margit Fothergill and a 12month-old son at [address] [place] [address] [address] [facility] . Mr. Tamara Mejia has 16 and 23 daughters from a previous relationship and they both live with their mother. Mr. Tamara Mejia reports he was born and raised in [facility].  He counts his children and girlfriend as his support system. 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 Ms. Lisa Arellano works as an [employer]  4-5 days a week. Mr. Angy Arzate states he applied for Social Security disability about a month ago and the application is pending. He is covered under Medicaid and receives $340 in SNAP benefits. He has transportation of his own and drives to all his appointments. Discussed and provided education on mileage reimbursement through Medicaid. Encouraged him to call his insurance company to set it up. He verbalized understanding. Mr. Angy Arzate states he is under a lot of financial stress and tries to brush it off and not dwell on it States he has coped reasonably well with his cancer diagnosis. Active listening and support provided. Plan/Referrals: Mr. Angy Arzate was provided information on Music therapy and Restorative Yoga and encouraged to utilize them. This LCSW will check with Mr. Angy Arzate when he returns to [facility] for the next infusion.

## 2022-06-20 NOTE — PROGRESS NOTES
[facility] Progress Note    Date: 2022    Name: Dominique Montana MRN: [id number]         : 1975    Mr. Regina Rose Arrived ambulatory and in no distress for C3D1 of Folfoxiri Regimen. Assessment was completed, no acute issues at this time, no new complaints voiced. Right chest wall port accessed without difficulty, labs drawn & sent for processing. Chemotherapy Flowsheet 2022   Cycle C3D1   Date 2022   Drug / Regimen Folfoxiri   Post Hydration -   Pre Meds given   Notes given       Mr. Karey Wellington vitals were reviewed. Visit Vitals  /87   Pulse 63   Temp 97.7 °F (36.5 °C)   Resp 18   Ht 5' 7\" (1.702 m)   Wt 59.7 kg (131 lb 9.6 oz)   SpO2 99%   BMI 20.61 kg/m²       Lab results were obtained and reviewed, within parameters for treatment. Oral potassium ordered and administered. Recent Results (from the past 12 hour(s))   CBC WITH AUTOMATED DIFF    Collection Time: 22  8:15 AM   Result Value Ref Range    WBC 4.8 4.1 - 11.1 K/uL    RBC 4.43 4.10 - 5.70 M/uL    HGB 11.4 (L) 12.1 - 17.0 g/dL    HCT 36.4 (L) 36.6 - 50.3 %    MCV 82.2 80.0 - 99.0 FL    MCH 25.7 (L) 26.0 - 34.0 PG    MCHC 31.3 30.0 - 36.5 g/dL    RDW 21.7 (H) 11.5 - 14.5 %    PLATELET 179 920 - 097 K/uL    MPV 9.6 8.9 - 12.9 FL    NRBC 0.0 0  WBC    ABSOLUTE NRBC 0.00 0.00 - 0.01 K/uL    NEUTROPHILS 49 32 - 75 %    BAND NEUTROPHILS 3 0 - 6 %    LYMPHOCYTES 36 12 - 49 %    MONOCYTES 11 5 - 13 %    EOSINOPHILS 1 0 - 7 %    BASOPHILS 0 0 - 1 %    IMMATURE GRANULOCYTES 0 %    ABS. NEUTROPHILS 2.6 1.8 - 8.0 K/UL    ABS. LYMPHOCYTES 1.7 0.8 - 3.5 K/UL    ABS. MONOCYTES 0.5 0.0 - 1.0 K/UL    ABS. EOSINOPHILS 0.0 0.0 - 0.4 K/UL    ABS. BASOPHILS 0.0 0.0 - 0.1 K/UL    ABS. IMM.  GRANS. 0.0 K/UL    DF MANUAL      RBC COMMENTS ANISOCYTOSIS  1+        RBC COMMENTS MICROCYTOSIS  1+       METABOLIC PANEL, COMPREHENSIVE    Collection Time: 22  8:15 AM   Result Value Ref Range    Sodium 142 136 - 145 mmol/L    Potassium 3.4 (L) 3.5 - 5.1 mmol/L    Chloride 108 97 - 108 mmol/L    CO2 24 21 - 32 mmol/L    Anion gap 10 5 - 15 mmol/L    Glucose 129 (H) 65 - 100 mg/dL    BUN 8 6 - 20 MG/DL    Creatinine 1.04 0.70 - 1.30 MG/DL    BUN/Creatinine ratio 8 (L) 12 - 20      GFR est AA >60 >60 ml/min/1.73m2    GFR est non-AA >60 >60 ml/min/1.73m2    Calcium 9.5 8.5 - 10.1 MG/DL    Bilirubin, total 0.2 0.2 - 1.0 MG/DL    ALT (SGPT) 23 12 - 78 U/L    AST (SGOT) 17 15 - 37 U/L    Alk.  phosphatase 128 (H) 45 - 117 U/L    Protein, total 7.0 6.4 - 8.2 g/dL    Albumin 2.9 (L) 3.5 - 5.0 g/dL    Globulin 4.1 (H) 2.0 - 4.0 g/dL    A-G Ratio 0.7 (L) 1.1 - 2.2         Medications:  Medications Administered     dexamethasone (DECADRON) 12 mg in 0.9% sodium chloride 50 mL IVPB     Admin Date  06/20/2022 Action  New Bag Dose  12 mg Route  IntraVENous Administered By  [place], [place]          dextrose 5% infusion     Admin Date  06/20/2022 Action  New Bag Dose  25 mL/hr Rate  25 mL/hr Route  IntraVENous Administered By  [place], [place]          fluorouraciL (ADRUCIL) 4,080 mg in 0.9% sodium chloride 100 mL CADD Cassette     Admin Date  06/20/2022 Action  New Bag Dose  4,080 mg Rate  2.1 mL/hr Route  IntraVENous Administered By  [place], [place]          irinotecan (CAMPTOSAR) 281 mg in dextrose 5% 250 mL, overfill volume 25 mL chemo infusion     Admin Date  06/20/2022 Action  New Bag Dose  281 mg Rate  192.7 mL/hr Route  IntraVENous Administered By  [place], [place]          leucovorin (WELLCOVORIN) 680 mg in dextrose 5% 250 mL, overfill volume 25 mL IVPB     Admin Date  06/20/2022 Action  New Bag Dose  680 mg Rate  154.5 mL/hr Route  IntraVENous Administered By  [place], [place]          oxaliplatin (ELOXATIN) 144.5 mg in dextrose 5% 250 mL, overfill volume 25 mL chemo infusion     Admin Date  06/20/2022 Action  New Bag Dose  144.5 mg Rate  152 mL/hr Route  IntraVENous Administered By  ESTHER [place], [place]          palonosetron HCl (ALOXI) injection 0.25 mg Admin Date  06/20/2022 Action  Given Dose  0.25 mg Route  IntraVENous Administered By  Author Bentley Lamb          potassium chloride SR (KLOR-CON 10) tablet 30 mEq     Admin Date  06/20/2022 Action  Given Dose  30 mEq Route  Oral Administered By  Author Bentely Lamb                  Two nurses verified prior to administering:  Drug name  Drug dose  Infusion volume or drug volume when prepared in a syringe  Rate of administration  Route of administration  Expiration dates and/or times  Appearance and physical integrity of the drugs  Rate set on infusion pump, when used  Sequencing of drug administration          Mr. Moy Never tolerated treatment well and was discharged from [place] [address] in stable condition at 1500. Port de-accessed, flushed & heparinized per protocol. He is to return on June 22 at 1500 for his next appointment.     JOLENE Jay  June 20, 2022

## 2022-06-20 NOTE — PATIENT INSTRUCTIONS
Dear Humera Morejon. ,    It was a pleasure seeing you today in [facility]. We will see you again in 5 to 6 weeks for an office visit to coordinate with your infusion. If labs or imaging tests have been ordered for you today, please call the office  at [phone] 48 hours after completion to obtain the results. Your described symptoms were: Fatigue: 4 Drowsiness: 4   Depression: 1 Pain: 5   Anxiety: 8 Nausea: 0   Anorexia: 4 Dyspnea: 1   Best Well-Bein Constipation: No   Other Problem (Comment): 0       This is the plan we talked about:    1. Abdominal pain  -Start extended-release oxycodone 10-mg every 12 hours  -Continue oxycodone 10-mg every 4 hours as needed    2. Right arm pain  -The etiology (cause) of this pain is unclear at this point  -Please call if the pain increases in intensity, persists, if you develop muscle weakness or any other acute symptom such as acute shortness of breath, chest pain or pressure, or dizziness    3. Depression/anxiety  -You have chronic anxiety which is unchanged  -Continue Lexapro 20-mg daily  -I'm avoiding benzodiazepines due to synergistic effect with opioids  -Today you me, our , Amaris Deluna, who is available to you for supportive counseling    4. Poor appetite/weight loss  -Continue eating your one good meal a day  -Try to eat smaller amounts of food throughout the day (4 to 5 times) to keep up with your nutritional needs  -You've been maintaining your weight in the 129-132 pound range for the past 5 weeks    5. Fatigue  -This is chronic and unchanged     6. Nausea  -Continue ondansetron 8-mg every 8 hours as needed  -Continue prochlorperazine 10-mg every 6 hours as needed    7.  Colon mass  -You're receiving chemotherapy under the care of Dr. Ashley Ash    This is what you have shared with us about Advance Care Planning:      Primary Decision Maker: Catrina Barba - Girlfriend - [phone]  Advance Care Planning 6/20/2022   Patient's Healthcare Decision Maker is: Legal Next of Kin   Confirm Advance Directive -   Patient Would Like to Complete Advance Directive -   Does the patient have other document types -           The Palliative Medicine Team is here to support you and your family.        Sincerely,      Tyrone Gongora MD and the Palliative Medicine Team

## 2022-06-22 ENCOUNTER — HOSPITAL ENCOUNTER (OUTPATIENT)
Dept: INFUSION THERAPY | Age: 47
Discharge: HOME | End: 2022-06-22
Payer: COMMERCIAL

## 2022-06-22 VITALS
TEMPERATURE: 98 F | OXYGEN SATURATION: 98 % | SYSTOLIC BLOOD PRESSURE: 118 MMHG | HEART RATE: 88 BPM | RESPIRATION RATE: 16 BRPM | DIASTOLIC BLOOD PRESSURE: 82 MMHG

## 2022-06-22 DIAGNOSIS — T45.1X5A CHEMOTHERAPY INDUCED NEUTROPENIA (HCC): Primary | ICD-10-CM

## 2022-06-22 DIAGNOSIS — C82.90 FOLLICULAR LYMPHOMA, UNSPECIFIED FOLLICULAR LYMPHOMA TYPE, UNSPECIFIED BODY REGION (HCC): ICD-10-CM

## 2022-06-22 DIAGNOSIS — D70.1 CHEMOTHERAPY INDUCED NEUTROPENIA (HCC): Primary | ICD-10-CM

## 2022-06-22 DIAGNOSIS — Z76.89 PREVENTION OF CHEMOTHERAPY-INDUCED NEUTROPENIA: ICD-10-CM

## 2022-06-22 DIAGNOSIS — C18.9 COLON ADENOCARCINOMA (HCC): ICD-10-CM

## 2022-06-22 PROCEDURE — 74011000250 HC RX REV CODE- 250: Performed by: INTERNAL MEDICINE

## 2022-06-22 PROCEDURE — 96377 APPLICATON ON-BODY INJECTOR: CPT

## 2022-06-22 PROCEDURE — 74011250636 HC RX REV CODE- 250/636: Performed by: INTERNAL MEDICINE

## 2022-06-22 PROCEDURE — 96523 IRRIG DRUG DELIVERY DEVICE: CPT

## 2022-06-22 PROCEDURE — 74011250636 HC RX REV CODE- 250/636: Performed by: NURSE PRACTITIONER

## 2022-06-22 RX ORDER — SODIUM CHLORIDE 0.9 % (FLUSH) 0.9 %
10 SYRINGE (ML) INJECTION AS NEEDED
Status: DISCONTINUED | OUTPATIENT
Start: 2022-06-22 | End: 2022-06-23 | Stop reason: HOSPADM

## 2022-06-22 RX ORDER — SODIUM CHLORIDE 9 MG/ML
10 INJECTION INTRAMUSCULAR; INTRAVENOUS; SUBCUTANEOUS AS NEEDED
Status: DISCONTINUED | OUTPATIENT
Start: 2022-06-22 | End: 2022-06-23 | Stop reason: HOSPADM

## 2022-06-22 RX ORDER — HEPARIN 100 UNIT/ML
300-500 SYRINGE INTRAVENOUS AS NEEDED
Status: DISCONTINUED | OUTPATIENT
Start: 2022-06-22 | End: 2022-06-23 | Stop reason: HOSPADM

## 2022-06-22 RX ADMIN — Medication 500 UNITS: at 15:13

## 2022-06-22 RX ADMIN — SODIUM CHLORIDE, PRESERVATIVE FREE 10 ML: 5 INJECTION INTRAVENOUS at 15:13

## 2022-06-22 RX ADMIN — PEGFILGRASTIM 6 MG: KIT SUBCUTANEOUS at 15:16

## 2022-06-22 NOTE — PROGRESS NOTES
[facility] Progress Note    Date: 2022    Name: Lucrecia Meyer. MRN: [id number]         : 1975:            Mr. Anjali Marinelli arrived ambulatory and in no distress for Pump Removal+ neulasta. Assessment was completed, patient is having his usual R sided abdominal pain, patient taking pain meds at home. CADD completed in infusion center- 100 ml infused per order. Mr. Saurav Rush vitals were reviewed. Visit Vitals  /82   Pulse 88   Temp 98 °F (36.7 °C)   Resp 16   SpO2 98%     Medications Administered     heparin (porcine) pf 300-500 Units     Admin Date  2022 Action  Given Dose  500 Units Route  InterCATHeter Administered By  Raquel Gregory RN          pegfilgrastim (NEULASTA) wearable SQ injector 6 mg     Admin Date  2022 Action  Given Dose  6 mg Route  SubCUTAneous Administered By  Raquel Gregory RN          sodium chloride (NS) flush 10 mL     Admin Date  2022 Action  Given Dose  10 mL Route  IntraVENous Administered By  Raquel Gregory RN                  Mr. Anjali Marinelli tolerated treatment well and was discharged from [place] [address] in stable condition. Port de-accessed, flushed & heparinized per protocol. He is to return on  at 0730 for his next appointment.     Jose C Cuellar RN  2022

## 2022-06-24 RX ORDER — ATROPINE SULFATE 0.4 MG/ML
0.4 INJECTION, SOLUTION ENDOTRACHEAL; INTRAMEDULLARY; INTRAMUSCULAR; INTRAVENOUS; SUBCUTANEOUS
Status: CANCELLED | OUTPATIENT
Start: 2022-07-05

## 2022-06-24 RX ORDER — SODIUM CHLORIDE 0.9 % (FLUSH) 0.9 %
10 SYRINGE (ML) INJECTION AS NEEDED
Status: CANCELLED | OUTPATIENT
Start: 2022-07-07

## 2022-06-24 RX ORDER — ONDANSETRON 2 MG/ML
8 INJECTION INTRAMUSCULAR; INTRAVENOUS AS NEEDED
Status: CANCELLED | OUTPATIENT
Start: 2022-07-05

## 2022-06-24 RX ORDER — DIPHENHYDRAMINE HYDROCHLORIDE 50 MG/ML
50 INJECTION, SOLUTION INTRAMUSCULAR; INTRAVENOUS AS NEEDED
Status: CANCELLED
Start: 2022-07-05

## 2022-06-24 RX ORDER — ACETAMINOPHEN 325 MG/1
650 TABLET ORAL AS NEEDED
Status: CANCELLED
Start: 2022-07-05

## 2022-06-24 RX ORDER — HYDROCORTISONE SODIUM SUCCINATE 100 MG/2ML
100 INJECTION, POWDER, FOR SOLUTION INTRAMUSCULAR; INTRAVENOUS AS NEEDED
Status: CANCELLED | OUTPATIENT
Start: 2022-07-05

## 2022-06-24 RX ORDER — HEPARIN 100 UNIT/ML
300-500 SYRINGE INTRAVENOUS AS NEEDED
Status: CANCELLED
Start: 2022-07-07

## 2022-06-24 RX ORDER — DIPHENHYDRAMINE HYDROCHLORIDE 50 MG/ML
25 INJECTION, SOLUTION INTRAMUSCULAR; INTRAVENOUS AS NEEDED
Status: CANCELLED
Start: 2022-07-05

## 2022-06-24 RX ORDER — ALBUTEROL SULFATE 0.83 MG/ML
2.5 SOLUTION RESPIRATORY (INHALATION) AS NEEDED
Status: CANCELLED
Start: 2022-07-05

## 2022-06-24 RX ORDER — EPINEPHRINE 1 MG/ML
0.3 INJECTION, SOLUTION, CONCENTRATE INTRAVENOUS AS NEEDED
Status: CANCELLED | OUTPATIENT
Start: 2022-07-05

## 2022-06-24 RX ORDER — SODIUM CHLORIDE 9 MG/ML
10 INJECTION INTRAMUSCULAR; INTRAVENOUS; SUBCUTANEOUS AS NEEDED
Status: CANCELLED | OUTPATIENT
Start: 2022-07-07

## 2022-06-27 ENCOUNTER — APPOINTMENT (OUTPATIENT)
Dept: INFUSION THERAPY | Age: 47
End: 2022-06-27

## 2022-06-27 ENCOUNTER — TELEPHONE (OUTPATIENT)
Dept: PALLATIVE CARE | Age: 47
End: 2022-06-27

## 2022-06-28 ENCOUNTER — TELEPHONE (OUTPATIENT)
Dept: PALLATIVE CARE | Age: 47
End: 2022-06-28

## 2022-06-28 NOTE — TELEPHONE ENCOUNTER
Received PA denial for OxyContin 10 mg. Per insurance patient has to have tries and failed 2 preferred drugs morphine sulfate ER or fentanyl patch 72hr.

## 2022-06-28 NOTE — TELEPHONE ENCOUNTER
Palliative Medicine  Nursing Note  [phone]  Fax [phone]      Telephone Call  Patient Name: Marie Santa. YOB: [birth year]        Primary Decision Maker: Haja Srivastava - Girlfriend - [phone]   Advance Care Planning 6/20/2022   Patient's Healthcare Decision Maker is: Legal Next of Kin   Confirm Advance Directive -   Patient Would Like to Complete Advance Directive -   Does the patient have other document types -     Incoming call from Mr. Calos Flaherty who states that his Oxycodone IR needs a prior auth. He is out of Oxycodone as of last night. He shared that his Oxycontin was denied and he needs to try a different medication first. Dr. Raul Salazar made aware. Call placed to [facility] and verified the need for the PA regarding the Oxy IR. Pharmacy does have #90 tabs available today once medication is approved through the insurance. Prior Authorization initiated via Kian Mccray LPN.       Alex Aguayo RN  Palliative Medicine

## 2022-06-28 NOTE — TELEPHONE ENCOUNTER
PA initiated and pending for Oxycodone IR 10 mg. Completed via cover my meds, awaiting insurers decision.  Marked urgent expedition

## 2022-06-28 NOTE — TELEPHONE ENCOUNTER
The patient called in a lot of pain. Completely out of medication. Insurance is requesting prior auth for Oxycodone and proof that he attempted to try Morphine. Please give him a call back as soon as possible.  [phone]

## 2022-06-29 ENCOUNTER — APPOINTMENT (OUTPATIENT)
Dept: INFUSION THERAPY | Age: 47
End: 2022-06-29
Payer: COMMERCIAL

## 2022-07-02 NOTE — H&P
[address] HISTORY AND PHYSICAL Name:  Chelsea Palomino 
MR#:  [id number] :  1975 ACCOUNT #:  [de-identified] ADMIT DATE:  2019 PRIMARY CARE PHYSICIAN:  Unknown. SOURCE OF INFORMATION:  Review of medical records from [facility]. CHIEF COMPLAINT:  Chest pain. HISTORY OF PRESENT ILLNESS:  This is a 79-year-old man with a past medical history 
significant for high-grade follicular lymphoma, hypertension, dyslipidemia, anxiety 
disorder, was in his usual state of health until the day of presentation to the 
emergency room when the patient developed chest pain at the Outpatient [facility]fusion [facility]. The patient was at the infusion center to receive chemotherapy. It was 
reported that the patient complained of chest pain and subsequently became 
unresponsive. EMS was called. CPR was initiated for about one minute and the 
patient received one shock, was taken to the emergency room at [facility].  
When the patient arrived to the emergency room, the patient was intubated and code ICE was called. The emergency room physician discussed the patient with the 
cardiologist on-call at [facility], who advised transfer to [facility]. The 
Hospitalist Service was asked to directly admit the patient to the hospital.  When 
the patient arrived at [facility], the patient was seen by the cardiologist, 
code ICE was canceled. The patient was taken to cath lab, underwent cardiac 
catheterization that showed a lesion and a stent was placed. The patient returned to 
CCU. No prior history of heart disease. The patient was also seen on arrival by the 
pulmonologist for ventilator management. PAST MEDICAL HISTORY: 
1. High-grade follicular lymphoma. 2.  Hypertension. 3.  Dyslipidemia. 4.  Anxiety disorder. ALLERGIES:  NO KNOWN DRUG ALLERGIES. MEDICATIONS: 
1. Prednisone, dosage as directed. 2.  Oxycodone IR 5 mg every 6 hours as needed for pain. 3.  Ativan 0.5 mg every 8 hours as needed for anxiety. 4.  Hydrochlorothiazide one tablet daily. 5.  Lisinopril 10 mg daily. 6.  Lipitor 10 mg daily. 7.  Compazine 10 mg every 6 hours as needed. 8.  Zofran 8 mg every 8 hours as needed for nausea. 9.  Aspirin 81 mg daily. FAMILY HISTORY:  This was reviewed. His father has hypertension and lymphoma, the 
lymphoma is in remission. His mother has diabetes. PAST SURGICAL HISTORY:  Significant for placement of a vascular access for 
chemotherapy. SOCIAL HISTORY:  The patient smokes about a pack of cigarettes daily. No history of 
alcohol abuse. REVIEW OF SYSTEMS:  Unable to obtain because the patient is intubated and sedated. PHYSICAL EXAMINATION: 
GENERAL APPEARANCE:  The patient appeared ill, in critical condition. VITAL SIGNS:  Temperature 97.0, pulse 78, blood pressure 84/67, respiratory rate 16, 
oxygen saturation 100%. HEENT:  Head:  Normocephalic, atraumatic. Eyes:  Unable to access eye movements, but 
no redness, no drainage, no discharge. Ears:  Normal external ears with no obvious 
drainage. Nose:  No deformity, no drainage. Mouth and Throat:  No visible oral 
lesion. The patient is orally intubated. NECK:  Supple. No JVD. No thyromegaly. CHEST:  Clear breath sounds. No wheezing. No crackles. HEART:  Normal S1 and S2.  Regular. No clinically appreciable murmur. ABDOMEN:  Soft and nontender. Normal bowel sounds. CNS:  The patient is intubated and sedated. EXTREMITIES:  No edema. Pulses 2+ bilaterally. MUSCULOSKELETAL SYSTEM:  No obvious joint deformity or swelling. SKIN:  No urticarial skin lesion seen on the exposed part of the body. Vascular 
access noted in the groin. PSYCHIATRY:  Unable to access mood and affect. LYMPHATIC SYSTEM:  Bilateral inguinal lymphadenopathy noted. DIAGNOSTIC DATA:  EKG shows normal sinus rhythm and nonspecific ST and T-wave abnormalities. Chest x-ray, no acute pathology. LABORATORY DATA:  Hematology, WBC 31.2, hemoglobin 12.3, hematocrit 35.6, platelet 252. Chemistry:  Sodium 134, potassium 4.0, chloride 101, CO2 of 22, glucose 119, 
BUN 17, creatinine 1.10, calcium 9.0, bilirubin total 0.1, total protein 6.6, albumin 
level 3.3, globulin 3.3, ALT 38, AST 28, alkaline phosphatase 100. Troponin 0.06. 
 
ASSESSMENT: 
1.  Status post cardiac arrest. 
2.  Follicular lymphoma. 3.  Tobacco abuse. 4.  Hypotension. 5.  Dyslipidemia. 6.  Anxiety disorder. 7.  Leucocytosis. PLAN: 
1. Status post cardiac arrest.  We will admit the patient for further evaluation and 
treatment. The patient underwent cardiac catheterization with stent placement. We 
will continue postcardiac care as per Cardiology. 2.  Follicular lymphoma. The patient will require Oncology consult for continuation 
of care once the patient is medically stable and recovered from the cardiac arrest. 
3.  Tobacco abuse. We will place the patient on Nicoderm patch. 4.  Hypotension. The patient has a history of hypertension, but this patient is 
presently hypotensive. The patient may require vasopressor if there is no response 
to fluid therapy. 5.  Dyslipidemia. The patient will resume preadmission medication once he is 
medically stable. 6.  Anxiety disorder. The patient is intubated and sedated. 7.  Leucocytosis. The patient's initial white count was 12,000, this went up to 
31,000. The patient is afebrile, but in the setting of lymphoma and significant 
leucocytosis, we will check lactic acid level, we will check urinalysis, we will 
start the patient on vancomycin and Zosyn. We will obtain a CT scan of the chest, 
abdomen, and pelvis to evaluate the patient for possible sources of infection. We 
will check amylase and lipase level. 8.  Other issues:  Code Status: The patient is a full code. We will request [facility] for deep venous thrombosis prophylaxis. The patient will require heparin or Lovenox for 
deep venous thrombosis prophylaxis once the patient is medically stable in the next 24 to 48 hours. Janett Goldmann, MD 
 
 
[id number] 
D:  02/14/2019 19:44 
T:  02/15/2019 2:29 
JOB #:  [id number] normal (ped)... In no apparent distress.

## 2022-07-05 ENCOUNTER — OFFICE VISIT (OUTPATIENT)
Dept: ONCOLOGY | Age: 47
End: 2022-07-05
Payer: COMMERCIAL

## 2022-07-05 ENCOUNTER — TELEPHONE (OUTPATIENT)
Dept: ONCOLOGY | Age: 47
End: 2022-07-05

## 2022-07-05 ENCOUNTER — HOSPITAL ENCOUNTER (OUTPATIENT)
Dept: INFUSION THERAPY | Age: 47
Discharge: HOME | End: 2022-07-05
Payer: COMMERCIAL

## 2022-07-05 VITALS
HEIGHT: 67 IN | OXYGEN SATURATION: 100 % | TEMPERATURE: 96.5 F | HEART RATE: 63 BPM | WEIGHT: 130.7 LBS | RESPIRATION RATE: 16 BRPM | SYSTOLIC BLOOD PRESSURE: 148 MMHG | DIASTOLIC BLOOD PRESSURE: 97 MMHG | BODY MASS INDEX: 20.51 KG/M2

## 2022-07-05 VITALS
TEMPERATURE: 98.3 F | BODY MASS INDEX: 20.51 KG/M2 | HEIGHT: 67 IN | SYSTOLIC BLOOD PRESSURE: 136 MMHG | WEIGHT: 130.69 LBS | OXYGEN SATURATION: 100 % | RESPIRATION RATE: 16 BRPM | HEART RATE: 80 BPM | DIASTOLIC BLOOD PRESSURE: 91 MMHG

## 2022-07-05 DIAGNOSIS — C82.90 FOLLICULAR LYMPHOMA, UNSPECIFIED FOLLICULAR LYMPHOMA TYPE, UNSPECIFIED BODY REGION (HCC): ICD-10-CM

## 2022-07-05 DIAGNOSIS — T45.1X5A CHEMOTHERAPY INDUCED DIARRHEA: ICD-10-CM

## 2022-07-05 DIAGNOSIS — T45.1X5A CHEMOTHERAPY INDUCED NEUTROPENIA (HCC): Primary | ICD-10-CM

## 2022-07-05 DIAGNOSIS — C18.4 CARCINOMA OF TRANSVERSE COLON (HCC): Primary | ICD-10-CM

## 2022-07-05 DIAGNOSIS — K52.1 CHEMOTHERAPY INDUCED DIARRHEA: ICD-10-CM

## 2022-07-05 DIAGNOSIS — R10.31 RLQ ABDOMINAL PAIN: ICD-10-CM

## 2022-07-05 DIAGNOSIS — R53.0 NEOPLASTIC MALIGNANT RELATED FATIGUE: ICD-10-CM

## 2022-07-05 DIAGNOSIS — Z51.11 CHEMOTHERAPY MANAGEMENT, ENCOUNTER FOR: ICD-10-CM

## 2022-07-05 DIAGNOSIS — D70.1 CHEMOTHERAPY INDUCED NEUTROPENIA (HCC): Primary | ICD-10-CM

## 2022-07-05 DIAGNOSIS — Z76.89 PREVENTION OF CHEMOTHERAPY-INDUCED NEUTROPENIA: ICD-10-CM

## 2022-07-05 DIAGNOSIS — C18.9 COLON ADENOCARCINOMA (HCC): ICD-10-CM

## 2022-07-05 LAB
ALBUMIN SERPL-MCNC: 2.8 G/DL (ref 3.5–5)
ALBUMIN/GLOB SERPL: 0.8 {RATIO} (ref 1.1–2.2)
ALP SERPL-CCNC: 157 U/L (ref 45–117)
ALT SERPL-CCNC: 28 U/L (ref 12–78)
ANION GAP SERPL CALC-SCNC: 8 MMOL/L (ref 5–15)
AST SERPL-CCNC: 21 U/L (ref 15–37)
BASOPHILS # BLD: 0.1 K/UL (ref 0–0.1)
BASOPHILS NFR BLD: 1 % (ref 0–1)
BILIRUB SERPL-MCNC: 0.2 MG/DL (ref 0.2–1)
BUN SERPL-MCNC: 6 MG/DL (ref 6–20)
BUN/CREAT SERPL: 6 (ref 12–20)
CALCIUM SERPL-MCNC: 9.2 MG/DL (ref 8.5–10.1)
CHLORIDE SERPL-SCNC: 111 MMOL/L (ref 97–108)
CO2 SERPL-SCNC: 24 MMOL/L (ref 21–32)
CREAT SERPL-MCNC: 0.96 MG/DL (ref 0.7–1.3)
DIFFERENTIAL METHOD BLD: ABNORMAL
EOSINOPHIL # BLD: 0.1 K/UL (ref 0–0.4)
EOSINOPHIL NFR BLD: 1 % (ref 0–7)
ERYTHROCYTE [DISTWIDTH] IN BLOOD BY AUTOMATED COUNT: 24.6 % (ref 11.5–14.5)
GLOBULIN SER CALC-MCNC: 3.5 G/DL (ref 2–4)
GLUCOSE SERPL-MCNC: 110 MG/DL (ref 65–100)
HCT VFR BLD AUTO: 36.9 % (ref 36.6–50.3)
HGB BLD-MCNC: 11.6 G/DL (ref 12.1–17)
IMM GRANULOCYTES # BLD AUTO: 0.2 K/UL (ref 0–0.04)
IMM GRANULOCYTES NFR BLD AUTO: 2 % (ref 0–0.5)
LYMPHOCYTES # BLD: 1.6 K/UL (ref 0.8–3.5)
LYMPHOCYTES NFR BLD: 20 % (ref 12–49)
MCH RBC QN AUTO: 27 PG (ref 26–34)
MCHC RBC AUTO-ENTMCNC: 31.4 G/DL (ref 30–36.5)
MCV RBC AUTO: 86 FL (ref 80–99)
MONOCYTES # BLD: 0.6 K/UL (ref 0–1)
MONOCYTES NFR BLD: 8 % (ref 5–13)
NEUTS SEG # BLD: 5.5 K/UL (ref 1.8–8)
NEUTS SEG NFR BLD: 68 % (ref 32–75)
NRBC # BLD: 0 K/UL (ref 0–0.01)
NRBC BLD-RTO: 0 PER 100 WBC
PLATELET # BLD AUTO: 133 K/UL (ref 150–400)
PMV BLD AUTO: 11 FL (ref 8.9–12.9)
POTASSIUM SERPL-SCNC: 3.5 MMOL/L (ref 3.5–5.1)
PROT SERPL-MCNC: 6.3 G/DL (ref 6.4–8.2)
RBC # BLD AUTO: 4.29 M/UL (ref 4.1–5.7)
RBC MORPH BLD: ABNORMAL
SODIUM SERPL-SCNC: 143 MMOL/L (ref 136–145)
WBC # BLD AUTO: 8.1 K/UL (ref 4.1–11.1)

## 2022-07-05 PROCEDURE — 96417 CHEMO IV INFUS EACH ADDL SEQ: CPT

## 2022-07-05 PROCEDURE — 36593 DECLOT VASCULAR DEVICE: CPT

## 2022-07-05 PROCEDURE — 85025 COMPLETE CBC W/AUTO DIFF WBC: CPT

## 2022-07-05 PROCEDURE — 74011000258 HC RX REV CODE- 258: Performed by: INTERNAL MEDICINE

## 2022-07-05 PROCEDURE — 96415 CHEMO IV INFUSION ADDL HR: CPT

## 2022-07-05 PROCEDURE — 36415 COLL VENOUS BLD VENIPUNCTURE: CPT

## 2022-07-05 PROCEDURE — 96368 THER/DIAG CONCURRENT INF: CPT

## 2022-07-05 PROCEDURE — 99215 OFFICE O/P EST HI 40 MIN: CPT | Performed by: INTERNAL MEDICINE

## 2022-07-05 PROCEDURE — 96413 CHEMO IV INFUSION 1 HR: CPT

## 2022-07-05 PROCEDURE — 96416 CHEMO PROLONG INFUSE W/PUMP: CPT

## 2022-07-05 PROCEDURE — 77030012965 HC NDL HUBR BBMI -A

## 2022-07-05 PROCEDURE — 74011250636 HC RX REV CODE- 250/636: Performed by: NURSE PRACTITIONER

## 2022-07-05 PROCEDURE — 74011250636 HC RX REV CODE- 250/636: Performed by: INTERNAL MEDICINE

## 2022-07-05 PROCEDURE — 96375 TX/PRO/DX INJ NEW DRUG ADDON: CPT

## 2022-07-05 PROCEDURE — 74011000250 HC RX REV CODE- 250: Performed by: NURSE PRACTITIONER

## 2022-07-05 PROCEDURE — 80053 COMPREHEN METABOLIC PANEL: CPT

## 2022-07-05 RX ORDER — SODIUM CHLORIDE 0.9 % (FLUSH) 0.9 %
10 SYRINGE (ML) INJECTION AS NEEDED
Status: DISPENSED | OUTPATIENT
Start: 2022-07-05 | End: 2022-07-05

## 2022-07-05 RX ORDER — SODIUM CHLORIDE 9 MG/ML
25 INJECTION, SOLUTION INTRAVENOUS CONTINUOUS
Status: DISCONTINUED | OUTPATIENT
Start: 2022-07-05 | End: 2022-07-07 | Stop reason: HOSPADM

## 2022-07-05 RX ORDER — SODIUM CHLORIDE 9 MG/ML
10 INJECTION INTRAMUSCULAR; INTRAVENOUS; SUBCUTANEOUS AS NEEDED
Status: ACTIVE | OUTPATIENT
Start: 2022-07-05 | End: 2022-07-05

## 2022-07-05 RX ORDER — PALONOSETRON 0.05 MG/ML
0.25 INJECTION, SOLUTION INTRAVENOUS ONCE
Status: COMPLETED | OUTPATIENT
Start: 2022-07-05 | End: 2022-07-05

## 2022-07-05 RX ORDER — DEXTROSE MONOHYDRATE 50 MG/ML
25 INJECTION, SOLUTION INTRAVENOUS CONTINUOUS
Status: DISPENSED | OUTPATIENT
Start: 2022-07-05 | End: 2022-07-05

## 2022-07-05 RX ORDER — HEPARIN 100 UNIT/ML
300-500 SYRINGE INTRAVENOUS AS NEEDED
Status: ACTIVE | OUTPATIENT
Start: 2022-07-05 | End: 2022-07-05

## 2022-07-05 RX ADMIN — ALTEPLASE 2 MG: 2.2 INJECTION, POWDER, LYOPHILIZED, FOR SOLUTION INTRAVENOUS at 08:13

## 2022-07-05 RX ADMIN — LEUCOVORIN CALCIUM 680 MG: 200 INJECTION, POWDER, LYOPHILIZED, FOR SUSPENSION INTRAMUSCULAR; INTRAVENOUS at 12:56

## 2022-07-05 RX ADMIN — FLUOROURACIL 4080 MG: 50 INJECTION, SOLUTION INTRAVENOUS at 15:18

## 2022-07-05 RX ADMIN — OXALIPLATIN 144.5 MG: 5 INJECTION, SOLUTION INTRAVENOUS at 12:56

## 2022-07-05 RX ADMIN — DEXAMETHASONE SODIUM PHOSPHATE 12 MG: 4 INJECTION, SOLUTION INTRA-ARTICULAR; INTRALESIONAL; INTRAMUSCULAR; INTRAVENOUS; SOFT TISSUE at 10:11

## 2022-07-05 RX ADMIN — DEXTROSE MONOHYDRATE 281 MG: 5 INJECTION, SOLUTION INTRAVENOUS at 11:19

## 2022-07-05 RX ADMIN — DEXTROSE MONOHYDRATE 25 ML/HR: 50 INJECTION, SOLUTION INTRAVENOUS at 10:08

## 2022-07-05 RX ADMIN — PALONOSETRON 0.25 MG: 0.25 INJECTION, SOLUTION INTRAVENOUS at 10:10

## 2022-07-05 NOTE — TELEPHONE ENCOUNTER
[address] at [facility]  [phone]        07/05/22 11:50 AM Blood specimen collected for [employer] testing. Requisition completed via online portal and printed to be sent with specimen. Scheduled Online-OR express -- # [id number].    07/15/22 8:58 AM [employer] results received and already scanned to patient's chart. Will notify NP. No further questions or concerns at this time.

## 2022-07-05 NOTE — PROGRESS NOTES
Chief Complaint   Patient presents with    Labs       Visit Vitals  BP (!) 136/91   Pulse 80   Temp 98.3 °F (36.8 °C)   Resp 16   Ht 5' 7\" (1.702 m)   Wt 130 lb 11 oz (59.3 kg)   SpO2 100%   BMI 20.47 kg/m²

## 2022-07-05 NOTE — PROGRESS NOTES
\[facility]\"" Progress Note    Date: 2022    Name: Иван Deng. MRN: [id number]         : 1975    Mr. Carlos Ferris Arrived ambulatory and in no distress for C4D1 of Folfoxiri Regimen. Assessment was completed, no acute issues at this time, no new complaints voiced. chest wall port accessed without difficulty no blood return noted. Positive blood return noted, positive free flow noted, labs drawn peripherally & sent for processing. Cathflo instilled. Chemotherapy Flowsheet 2022   Cycle C4D1   Date 2022   Drug / Regimen Folfoxiri   Post Hydration -   Pre Meds given   Notes given        Patient proceed to appointment with Dr. Melissa Cardenas. Patient returned with no change in treatment. Cathflo did not return blood flow to port. Received okay to proceed without blood return for treatment today. Mr. Kamar Esquivel vitals were reviewed. Visit Vitals  BP (!) 148/97   Pulse 63   Temp (!) 96.5 °F (35.8 °C)   Resp 16   Ht 5' 7\" (1.702 m)   Wt 59.3 kg (130 lb 11.2 oz)   SpO2 100%   BMI 20.47 kg/m²       Lab results were obtained and reviewed. Recent Results (from the past 12 hour(s))   CBC WITH AUTOMATED DIFF    Collection Time: 22  8:13 AM   Result Value Ref Range    WBC 8.1 4.1 - 11.1 K/uL    RBC 4.29 4. 10 - 5.70 M/uL    HGB 11.6 (L) 12.1 - 17.0 g/dL    HCT 36.9 36.6 - 50.3 %    MCV 86.0 80.0 - 99.0 FL    MCH 27.0 26.0 - 34.0 PG    MCHC 31.4 30.0 - 36.5 g/dL    RDW 24.6 (H) 11.5 - 14.5 %    PLATELET 918 (L) 134 - 400 K/uL    MPV 11.0 8.9 - 12.9 FL    NRBC 0.0 0  WBC    ABSOLUTE NRBC 0.00 0.00 - 0.01 K/uL    NEUTROPHILS 68 32 - 75 %    LYMPHOCYTES 20 12 - 49 %    MONOCYTES 8 5 - 13 %    EOSINOPHILS 1 0 - 7 %    BASOPHILS 1 0 - 1 %    IMMATURE GRANULOCYTES 2 (H) 0.0 - 0.5 %    ABS. NEUTROPHILS 5.5 1.8 - 8.0 K/UL    ABS. LYMPHOCYTES 1.6 0.8 - 3.5 K/UL    ABS. MONOCYTES 0.6 0.0 - 1.0 K/UL    ABS. EOSINOPHILS 0.1 0.0 - 0.4 K/UL    ABS. BASOPHILS 0.1 0.0 - 0.1 K/UL    ABS. IMM.  GRANS. 0.2 (H) 0.00 - 0.04 K/UL    DF SMEAR SCANNED      RBC COMMENTS NORMOCYTIC, NORMOCHROMIC     METABOLIC PANEL, COMPREHENSIVE    Collection Time: 07/05/22  8:13 AM   Result Value Ref Range    Sodium 143 136 - 145 mmol/L    Potassium 3.5 3.5 - 5.1 mmol/L    Chloride 111 (H) 97 - 108 mmol/L    CO2 24 21 - 32 mmol/L    Anion gap 8 5 - 15 mmol/L    Glucose 110 (H) 65 - 100 mg/dL    BUN 6 6 - 20 MG/DL    Creatinine 0.96 0.70 - 1.30 MG/DL    BUN/Creatinine ratio 6 (L) 12 - 20      GFR est AA >60 >60 ml/min/1.73m2    GFR est non-AA >60 >60 ml/min/1.73m2    Calcium 9.2 8.5 - 10.1 MG/DL    Bilirubin, total 0.2 0.2 - 1.0 MG/DL    ALT (SGPT) 28 12 - 78 U/L    AST (SGOT) 21 15 - 37 U/L    Alk.  phosphatase 157 (H) 45 - 117 U/L    Protein, total 6.3 (L) 6.4 - 8.2 g/dL    Albumin 2.8 (L) 3.5 - 5.0 g/dL    Globulin 3.5 2.0 - 4.0 g/dL    A-G Ratio 0.8 (L) 1.1 - 2.2         Medications:  Medications Administered     alteplase (CATHFLO) 2 mg in sterile water (preservative free) 2 mL injection     Admin Date  07/05/2022 Action  Given Dose  2 mg Route  InterCATHeter Administered By  aKrina Danielson RN          dexamethasone (DECADRON) 12 mg in 0.9% sodium chloride 50 mL IVPB     Admin Date  07/05/2022 Action  New Bag Dose  12 mg Route  IntraVENous Administered By  aKrina Danielson RN          dextrose 5% infusion     Admin Date  07/05/2022 Action  New Bag Dose  25 mL/hr Rate  25 mL/hr Route  IntraVENous Administered By  Karina Danielson RN          fluorouraciL (ADRUCIL) 4,080 mg in 0.9% sodium chloride 100 mL CADD Cassette     Admin Date  07/05/2022 Action  New Bag Dose  4,080 mg Rate  2.1 mL/hr Route  IntraVENous Administered By  Karina Danielson RN          irinotecan (CAMPTOSAR) 281 mg in dextrose 5% 250 mL, overfill volume 25 mL chemo infusion     Admin Date  07/05/2022 Action  New Bag Dose  281 mg Rate  192.7 mL/hr Route  IntraVENous Administered By  Karina Danielson RN          leucovorin (WELLCOVORIN) 680 mg in dextrose 5% 250 mL, overfill volume 25 mL IVPB     Admin Date  07/05/2022 Action  New Bag Dose  680 mg Rate  154.5 mL/hr Route  IntraVENous Administered By  Seema Gar RN          oxaliplatin (ELOXATIN) 144.5 mg in dextrose 5% 250 mL, overfill volume 25 mL chemo infusion     Admin Date  07/05/2022 Action  New Bag Dose  144.5 mg Rate  152 mL/hr Route  IntraVENous Administered By  Seema Gar RN          palonosetron HCl (ALOXI) injection 0.25 mg     Admin Date  07/05/2022 Action  Given Dose  0.25 mg Route  IntraVENous Administered By  Seema Gar RN                      Two nurses verified prior to administering:  Drug name  Drug dose  Infusion volume or drug volume when prepared in a syringe  Rate of administration  Route of administration  Expiration dates and/or times  Appearance and physical integrity of the drugs  Rate set on infusion pump, when used  Sequencing of drug administration          Mr. Cortney Calhoun tolerated treatment well and was discharged from [place] [address] in stable condition. CADD pump infusing. He is to return on July 7 at 1400 for his next appointment.     Yosvany Smith RN  July 5, 2022

## 2022-07-07 ENCOUNTER — HOSPITAL ENCOUNTER (OUTPATIENT)
Dept: INFUSION THERAPY | Age: 47
Discharge: HOME | End: 2022-07-07
Payer: MEDICAID

## 2022-07-07 VITALS
HEART RATE: 88 BPM | SYSTOLIC BLOOD PRESSURE: 119 MMHG | TEMPERATURE: 98 F | RESPIRATION RATE: 18 BRPM | DIASTOLIC BLOOD PRESSURE: 93 MMHG | OXYGEN SATURATION: 98 %

## 2022-07-07 DIAGNOSIS — C18.9 COLON ADENOCARCINOMA (HCC): ICD-10-CM

## 2022-07-07 DIAGNOSIS — D70.1 CHEMOTHERAPY INDUCED NEUTROPENIA (HCC): Primary | ICD-10-CM

## 2022-07-07 DIAGNOSIS — C82.90 FOLLICULAR LYMPHOMA, UNSPECIFIED FOLLICULAR LYMPHOMA TYPE, UNSPECIFIED BODY REGION (HCC): ICD-10-CM

## 2022-07-07 DIAGNOSIS — T45.1X5A CHEMOTHERAPY INDUCED NEUTROPENIA (HCC): Primary | ICD-10-CM

## 2022-07-07 DIAGNOSIS — Z76.89 PREVENTION OF CHEMOTHERAPY-INDUCED NEUTROPENIA: ICD-10-CM

## 2022-07-07 PROCEDURE — 74011250636 HC RX REV CODE- 250/636: Performed by: INTERNAL MEDICINE

## 2022-07-07 PROCEDURE — 96377 APPLICATON ON-BODY INJECTOR: CPT

## 2022-07-07 PROCEDURE — 96523 IRRIG DRUG DELIVERY DEVICE: CPT

## 2022-07-07 PROCEDURE — 74011000250 HC RX REV CODE- 250: Performed by: INTERNAL MEDICINE

## 2022-07-07 RX ORDER — SODIUM CHLORIDE 0.9 % (FLUSH) 0.9 %
10 SYRINGE (ML) INJECTION AS NEEDED
Status: DISCONTINUED | OUTPATIENT
Start: 2022-07-07 | End: 2022-07-08 | Stop reason: HOSPADM

## 2022-07-07 RX ORDER — SODIUM CHLORIDE 9 MG/ML
10 INJECTION INTRAMUSCULAR; INTRAVENOUS; SUBCUTANEOUS AS NEEDED
Status: DISCONTINUED | OUTPATIENT
Start: 2022-07-07 | End: 2022-07-08 | Stop reason: HOSPADM

## 2022-07-07 RX ORDER — HEPARIN 100 UNIT/ML
300-500 SYRINGE INTRAVENOUS AS NEEDED
Status: DISCONTINUED | OUTPATIENT
Start: 2022-07-07 | End: 2022-07-08 | Stop reason: HOSPADM

## 2022-07-07 RX ADMIN — SODIUM CHLORIDE, PRESERVATIVE FREE 10 ML: 5 INJECTION INTRAVENOUS at 15:23

## 2022-07-07 RX ADMIN — Medication 500 UNITS: at 15:24

## 2022-07-07 RX ADMIN — PEGFILGRASTIM 6 MG: KIT SUBCUTANEOUS at 15:23

## 2022-07-07 NOTE — PROGRESS NOTES
[facility] Progress Note    Date: 2022    Name: Zackary Blackmon MRN: [id number]         : 1975:            Mr. Rodolfo Sullivan arrived ambulatory and in no distress for Pump Removal + Neulasta. Assessment was completed, patient reports nausea controlled with PRN antiemetics at home. CADD completed in- 100 ml infused per order. Mr. Avtar Ford vitals were reviewed. Visit Vitals  BP (!) 119/93   Pulse 88   Temp 98 °F (36.7 °C)   Resp 18   SpO2 98%       Education provided to patient about Neulasta On Body Injector including: side effects, how/when to remove the device, as well as what to do in the event of device malfunction. Opportunity for questions was provided and all questions were answered. Patient verbalized understanding. Mr. Rodolfo Sullivan tolerated treatment well and was discharged from [place] [id number] in stable condition. Port de-accessed, flushed & heparinized per protocol. He is to return on 22 for his next appointment.     Yisel Chaney RN  2022

## 2022-07-08 RX ORDER — ONDANSETRON 2 MG/ML
8 INJECTION INTRAMUSCULAR; INTRAVENOUS AS NEEDED
Status: CANCELLED | OUTPATIENT
Start: 2022-07-18

## 2022-07-08 RX ORDER — SODIUM CHLORIDE 0.9 % (FLUSH) 0.9 %
10 SYRINGE (ML) INJECTION AS NEEDED
Status: CANCELLED | OUTPATIENT
Start: 2022-07-18

## 2022-07-08 RX ORDER — SODIUM CHLORIDE 9 MG/ML
10 INJECTION INTRAMUSCULAR; INTRAVENOUS; SUBCUTANEOUS AS NEEDED
Status: CANCELLED | OUTPATIENT
Start: 2022-07-27

## 2022-07-08 RX ORDER — DIPHENHYDRAMINE HYDROCHLORIDE 50 MG/ML
25 INJECTION, SOLUTION INTRAMUSCULAR; INTRAVENOUS AS NEEDED
Status: CANCELLED
Start: 2022-07-18

## 2022-07-08 RX ORDER — SODIUM CHLORIDE 9 MG/ML
10 INJECTION INTRAMUSCULAR; INTRAVENOUS; SUBCUTANEOUS AS NEEDED
Status: CANCELLED | OUTPATIENT
Start: 2022-07-18

## 2022-07-08 RX ORDER — DIPHENHYDRAMINE HYDROCHLORIDE 50 MG/ML
50 INJECTION, SOLUTION INTRAMUSCULAR; INTRAVENOUS AS NEEDED
Status: CANCELLED
Start: 2022-07-18

## 2022-07-08 RX ORDER — DEXTROSE MONOHYDRATE 50 MG/ML
25 INJECTION, SOLUTION INTRAVENOUS CONTINUOUS
Status: CANCELLED
Start: 2022-07-18

## 2022-07-08 RX ORDER — PALONOSETRON 0.05 MG/ML
0.25 INJECTION, SOLUTION INTRAVENOUS ONCE
Status: CANCELLED | OUTPATIENT
Start: 2022-07-18 | End: 2022-07-18

## 2022-07-08 RX ORDER — HYDROCORTISONE SODIUM SUCCINATE 100 MG/2ML
100 INJECTION, POWDER, FOR SOLUTION INTRAMUSCULAR; INTRAVENOUS AS NEEDED
Status: CANCELLED | OUTPATIENT
Start: 2022-07-18

## 2022-07-08 RX ORDER — EPINEPHRINE 1 MG/ML
0.3 INJECTION, SOLUTION, CONCENTRATE INTRAVENOUS AS NEEDED
Status: CANCELLED | OUTPATIENT
Start: 2022-07-18

## 2022-07-08 RX ORDER — HEPARIN 100 UNIT/ML
300-500 SYRINGE INTRAVENOUS AS NEEDED
Status: CANCELLED
Start: 2022-07-18

## 2022-07-08 RX ORDER — SODIUM CHLORIDE 9 MG/ML
25 INJECTION, SOLUTION INTRAVENOUS CONTINUOUS
Status: CANCELLED
Start: 2022-07-18

## 2022-07-08 RX ORDER — ALBUTEROL SULFATE 0.83 MG/ML
2.5 SOLUTION RESPIRATORY (INHALATION) AS NEEDED
Status: CANCELLED
Start: 2022-07-18

## 2022-07-08 RX ORDER — SODIUM CHLORIDE 0.9 % (FLUSH) 0.9 %
10 SYRINGE (ML) INJECTION AS NEEDED
Status: CANCELLED | OUTPATIENT
Start: 2022-07-27

## 2022-07-08 RX ORDER — ACETAMINOPHEN 325 MG/1
650 TABLET ORAL AS NEEDED
Status: CANCELLED
Start: 2022-07-18

## 2022-07-08 RX ORDER — HEPARIN 100 UNIT/ML
300-500 SYRINGE INTRAVENOUS AS NEEDED
Status: CANCELLED
Start: 2022-07-27

## 2022-07-08 RX ORDER — ATROPINE SULFATE 0.4 MG/ML
0.4 INJECTION, SOLUTION ENDOTRACHEAL; INTRAMEDULLARY; INTRAMUSCULAR; INTRAVENOUS; SUBCUTANEOUS
Status: CANCELLED | OUTPATIENT
Start: 2022-07-18

## 2022-07-11 ENCOUNTER — APPOINTMENT (OUTPATIENT)
Dept: INFUSION THERAPY | Age: 47
End: 2022-07-11

## 2022-07-12 ENCOUNTER — TELEPHONE (OUTPATIENT)
Dept: PALLATIVE CARE | Age: 47
End: 2022-07-12

## 2022-07-12 DIAGNOSIS — R10.84 ABDOMINAL PAIN, GENERALIZED: ICD-10-CM

## 2022-07-12 DIAGNOSIS — C18.9 COLON ADENOCARCINOMA (HCC): ICD-10-CM

## 2022-07-12 DIAGNOSIS — C78.6 PERITONEAL CARCINOMATOSIS (HCC): ICD-10-CM

## 2022-07-12 RX ORDER — OXYCODONE HYDROCHLORIDE 10 MG/1
10 TABLET ORAL
Qty: 90 TABLET | Refills: 0 | Status: SHIPPED | OUTPATIENT
Start: 2022-07-12 | End: 2022-07-26 | Stop reason: SDUPTHER

## 2022-07-12 NOTE — TELEPHONE ENCOUNTER
Triage for Controlled Substance Refill Request    Pain Diagnosis: Abdominal pain    Last Outpatient Visit: 6/20/22    Next Outpatient Visit: 8/1/22    Reason for refill needed outside of office visit?  Appointment not scheduled prior to need for scheduled refill    -Pain escalation requiring increased medication  -Appointment scheduled but missed or moved prior to need for scheduled refill    Pharmacy: [facility]    Medication: Oxycodone 10 mg  Dose and directions: 1 tab every 4 hours as needed  Number dispensed:90  Date filled ( or Pharmacy): 6/28/22  # left:     reviewed: Yes    Date of Urine Drug Screen:  n/a    Opioid Safety Handout given:  yes    Appropriate for refill: Yes    Action: Please sign

## 2022-07-13 ENCOUNTER — TELEPHONE (OUTPATIENT)
Dept: ONCOLOGY | Age: 47
End: 2022-07-13

## 2022-07-13 ENCOUNTER — APPOINTMENT (OUTPATIENT)
Dept: INFUSION THERAPY | Age: 47
End: 2022-07-13
Payer: MEDICAID

## 2022-07-13 ENCOUNTER — HOSPITAL ENCOUNTER (OUTPATIENT)
Dept: CT IMAGING | Age: 47
Discharge: HOME | End: 2022-07-13
Attending: INTERNAL MEDICINE

## 2022-07-13 DIAGNOSIS — C18.4 CARCINOMA OF TRANSVERSE COLON (HCC): ICD-10-CM

## 2022-07-13 NOTE — TELEPHONE ENCOUNTER
07/13/22 3:50 PM Called pt to determine why scan was cancelled. Placed on hold and eventually disconnected. Will try again tomorrow.

## 2022-07-14 ENCOUNTER — TELEPHONE (OUTPATIENT)
Dept: ONCOLOGY | Age: 47
End: 2022-07-14

## 2022-07-18 ENCOUNTER — HOSPITAL ENCOUNTER (OUTPATIENT)
Dept: INFUSION THERAPY | Age: 47
Discharge: HOME | End: 2022-07-18

## 2022-07-18 RX ORDER — PALONOSETRON 0.05 MG/ML
0.25 INJECTION, SOLUTION INTRAVENOUS ONCE
Status: CANCELLED | OUTPATIENT
Start: 2022-07-25 | End: 2022-07-25

## 2022-07-18 RX ORDER — EPINEPHRINE 1 MG/ML
0.3 INJECTION, SOLUTION, CONCENTRATE INTRAVENOUS AS NEEDED
Status: CANCELLED | OUTPATIENT
Start: 2022-07-25

## 2022-07-18 RX ORDER — DEXTROSE MONOHYDRATE 50 MG/ML
25 INJECTION, SOLUTION INTRAVENOUS CONTINUOUS
Status: CANCELLED
Start: 2022-07-25

## 2022-07-18 RX ORDER — HEPARIN 100 UNIT/ML
300-500 SYRINGE INTRAVENOUS AS NEEDED
Status: CANCELLED
Start: 2022-07-25

## 2022-07-18 RX ORDER — DIPHENHYDRAMINE HYDROCHLORIDE 50 MG/ML
25 INJECTION, SOLUTION INTRAMUSCULAR; INTRAVENOUS AS NEEDED
Status: CANCELLED
Start: 2022-07-25

## 2022-07-18 RX ORDER — ALBUTEROL SULFATE 0.83 MG/ML
2.5 SOLUTION RESPIRATORY (INHALATION) AS NEEDED
Status: CANCELLED
Start: 2022-07-25

## 2022-07-18 RX ORDER — ATROPINE SULFATE 0.4 MG/ML
0.4 INJECTION, SOLUTION ENDOTRACHEAL; INTRAMEDULLARY; INTRAMUSCULAR; INTRAVENOUS; SUBCUTANEOUS
Status: CANCELLED | OUTPATIENT
Start: 2022-07-25

## 2022-07-18 RX ORDER — ONDANSETRON 2 MG/ML
8 INJECTION INTRAMUSCULAR; INTRAVENOUS AS NEEDED
Status: CANCELLED | OUTPATIENT
Start: 2022-07-25

## 2022-07-18 RX ORDER — SODIUM CHLORIDE 0.9 % (FLUSH) 0.9 %
10 SYRINGE (ML) INJECTION AS NEEDED
Status: CANCELLED | OUTPATIENT
Start: 2022-07-25

## 2022-07-18 RX ORDER — SODIUM CHLORIDE 9 MG/ML
10 INJECTION INTRAMUSCULAR; INTRAVENOUS; SUBCUTANEOUS AS NEEDED
Status: CANCELLED | OUTPATIENT
Start: 2022-07-25

## 2022-07-18 RX ORDER — SODIUM CHLORIDE 9 MG/ML
25 INJECTION, SOLUTION INTRAVENOUS CONTINUOUS
Status: CANCELLED
Start: 2022-07-25

## 2022-07-18 RX ORDER — ACETAMINOPHEN 325 MG/1
650 TABLET ORAL AS NEEDED
Status: CANCELLED
Start: 2022-07-25

## 2022-07-18 RX ORDER — DIPHENHYDRAMINE HYDROCHLORIDE 50 MG/ML
50 INJECTION, SOLUTION INTRAMUSCULAR; INTRAVENOUS AS NEEDED
Status: CANCELLED
Start: 2022-07-25

## 2022-07-18 RX ORDER — HYDROCORTISONE SODIUM SUCCINATE 100 MG/2ML
100 INJECTION, POWDER, FOR SOLUTION INTRAMUSCULAR; INTRAVENOUS AS NEEDED
Status: CANCELLED | OUTPATIENT
Start: 2022-07-25

## 2022-07-19 ENCOUNTER — HOSPITAL ENCOUNTER (OUTPATIENT)
Dept: CT IMAGING | Age: 47
Discharge: HOME | End: 2022-07-19
Attending: INTERNAL MEDICINE
Payer: MEDICAID

## 2022-07-19 PROCEDURE — 74177 CT ABD & PELVIS W/CONTRAST: CPT

## 2022-07-19 PROCEDURE — 74011000636 HC RX REV CODE- 636: Performed by: INTERNAL MEDICINE

## 2022-07-19 RX ADMIN — IOPAMIDOL 100 ML: 755 INJECTION, SOLUTION INTRAVENOUS at 13:52

## 2022-07-20 ENCOUNTER — HOSPITAL ENCOUNTER (OUTPATIENT)
Dept: INFUSION THERAPY | Age: 47
End: 2022-07-20

## 2022-07-20 NOTE — PROGRESS NOTES
[id number] [facility]  [phone]    Hematology / Oncology Established Visit    Reason for Visit:   Tania Almendarez is a 42 y.o. male who is seen for urgent follow up of colon cancer, h/o lymphoma. Hematology Oncology Treatment History:     Diagnosis #1: Follicular lymphoma  Stage: IV  Pathology:   11/13/18 right inguinal LN excision: Follicular lymphoma, high-grade (grade 3a of 3). Prior Treatment:   1. Obinutuzumab-CHOP. Obinutuzumab: 1000 mg weekly on days 1, 8, 15 for cycle 1, then 1000 mg on day 1 q21 days for cycles 2-6, then monotherapy 1000 mg every 21 days for cycle 7, 8 with Cyclophosphamide 750mg/m2, Doxorubicin 50mg/m2, Vincristine 1.4mg/m2 on day 1 and Prednisone 100mg on Days 1-5, every 21 days for a total of 2 cycles completed late 1/2019. Regimen discontinued due to STEMI. 2. Obinutuzumab + Bendamustine: 1000 mg Obinutuzumab on day 1 + Bendamustine 90mg/m2 on days 1-2 on a 28-day cycle x 4 cycles, completed 5/2019  Current Treatment: Surveillance      Diagnosis #2: Colon cancer:  Stage: IV  Pathology:  Ascending colon; biopsy: poorly differentiated carcinoma  Comment: No dysplasia is identified. Immunohistochemical stains performed on block 1A, show the tumor positive for Cam5.2 and shows patchy positivity for CDX2 with a Ki-67 index of -90%; the tumor is focally positive for CK5/6 and GATA3; negative for p63, Pax8, CK7, CK20, panmelanoma, synaptophysin and chromogranin. The immunophenotypic features are nonspecific but argues somewhat against the following carcinoma: urothelial, neuroendocrine and breast. The immunophenotypic features also argues against melanoma and somewhat against classic colorectal carcinoma. Additional immunohistochemical stains to exclude the possibility of prostate and hepatocellular carcinoma have been   ordered; the results will be reported as an addendum.   MLH1/MSH2/MSH6/PMS2 all intact with no loss of nuclear expression of MMR proteins - no MSI   Addendum: The addendum is issued to report the results of additional immunohistochemical stains in an attempt to ascertain the primary site of the carcinoma. The diagnosis is unchanged. Hepar and glypican 3 immunohistochemical stains are neg, arguing against hepatocellular carcinoma. PSA stain is negative, arguing against prostatic primary. Imaging studies to eval for poss primary sites maybe useful. In the absence of carcinoma/tumor at any other sites, this may represent an undifferentiated carcinoma of the colon. Oncogenomics (molecular) studies: POLE< ARID1A, YVONNE, ATR, BRCA2, CHECK1, FANCI, NF1, PIK3CA, PTCH1, PTEN, RAD50, RAD51, RB1, SMARCA4, STK11      Prior Treatment:   1. Loop ileostomy 2/19/22  2. Diagnotic laparoscopy with peritoneal biopsy, 4/27/22      Current Treatment: FOLFOXIRI every 2 weeks until disease progression or toxicity, 5/16/22 - current        Oncologic History:  Was in his usual state of health in early November 2018 when he developed low back pain and presented to the ER. Work-up at outside hospital revealed a retroperitoneal mass seen on CT imaging, and he was transferred to [facility] for further work-up. CT there showed a large retroperitoneal mass encircling the aorta with invasion of the left renal hilum and left adrenal gland. There were bilateral inguinal lymph nodes and moderate left hydronephrosis. He was evaluated while at [facility] and was noted to have palpable nodes in his groin for approximately the past 1 month. He underwent excisional LN biopsy of right inguinal LN, which revealed follicular lymphoma. At time of diagnosis, he had no cardiac disease aside from HTN, hyperlipidemia. However, he did have an NSTEMI after cycle 2 of O-CHOP. Was likely unrelated to chemotherapy, but opted to switch treatment to Obinutuzumab-Bendamustine. He completed treatment in 5/2019 and had a CR based on PET.     History of Present Illness:   Mr. Segundo Pressley is a 42 y.o. male with prior h/o follicular lymphoma is seen for follow up of colon cancer and C5 of treatment. He is one week late due to a delay in scheduling imaging. Completed Ct imaging. Good energy and appetite. Weight is stable. Reports he feels well. No n/v, diarrhea, constipation (soft stool output in colostomy bag), neuropathy, rashes, fevers, chills. PMHx: Lymphoma in 2018, CAD, HTN, Hyperlipidemia, Anxiety, chronic low back pain  PSurgHx: None aside from cardiac stent placement and port placement  SHx: Smokes 1/2ppd x past 20 yrs. Works part time as a cook. Has 2 children. FHx: Father had leukemia, passed away from pancreatic cancer. Review of Systems: A complete review of systems was obtained, negative except as described above. Physical Exam:     Visit Vitals  /86   Pulse 81   Temp 98.1 °F (36.7 °C)   Ht 5' 7\" (1.702 m)   Wt 131 lb 1.6 oz (59.5 kg)   SpO2 98%   BMI 20.53 kg/m²       ECOG PS: 0  General: no distress  Eyes: anicteric sclerae  HENT: oropharynx clear  Neck: supple  Lymphatic: no cervical, supraclavicular adenopathy  Respiratory: normal respiratory effort  CV: no peripheral edema, port upper chest   GI: soft, nontender, nondistended, no masses;  colostomy in place. Skin: no rashes; no ecchymoses; no petechiae      Results:     Lab Results   Component Value Date/Time    WBC 8.1 07/05/2022 08:13 AM    HGB 11.6 (L) 07/05/2022 08:13 AM    HCT 36.9 07/05/2022 08:13 AM    PLATELET 716 (L) 38/87/7344 08:13 AM    MCV 86.0 07/05/2022 08:13 AM    ABS.  NEUTROPHILS 5.5 07/05/2022 08:13 AM    Hemoglobin (POC) 15.0 06/05/2009 02:13 PM    Hematocrit (POC) 39 02/14/2019 01:24 PM     Lab Results   Component Value Date/Time    Sodium 143 07/05/2022 08:13 AM    Potassium 3.5 07/05/2022 08:13 AM    Chloride 111 (H) 07/05/2022 08:13 AM    CO2 24 07/05/2022 08:13 AM    Glucose 110 (H) 07/05/2022 08:13 AM    BUN 6 07/05/2022 08:13 AM    Creatinine 0.96 07/05/2022 08:13 AM    GFR est AA >60 2022 08:13 AM    GFR est non-AA >60 2022 08:13 AM    Calcium 9.2 2022 08:13 AM    Sodium (POC) 136 2019 01:24 PM    Potassium (POC) 3.9 2019 01:24 PM    Chloride (POC) 102 2019 01:24 PM    Glucose (POC) 249 (H) 02/15/2019 10:21 PM    BUN (POC) 14 2019 01:24 PM    Creatinine (POC) 0.9 2019 01:24 PM    Calcium, ionized (POC) 1.24 2019 01:24 PM     Lab Results   Component Value Date/Time    Bilirubin, total 0.2 2022 08:13 AM    ALT (SGPT) 28 2022 08:13 AM    Alk. phosphatase 157 (H) 2022 08:13 AM    Protein, total 6.3 (L) 2022 08:13 AM    Albumin 2.8 (L) 2022 08:13 AM    Globulin 3.5 2022 08:13 AM     Lab Results   Component Value Date/Time    Iron 18 (L) 2022 11:13 AM    TIBC 173 (L) 2022 11:13 AM    Iron % saturation 10 (L) 2022 11:13 AM    Ferritin 426 (H) 2022 11:13 AM       No results found for: B12LT, FOL, RBCF  Lab Results   Component Value Date/Time    TSH 1.53 2016 04:40 AM       Lab Results   Component Value Date/Time    Hepatitis A, IgM NONREACTIVE 2018 04:53 PM    Hepatitis B surface Ag <0.10 2022 08:16 AM    Hepatitis B surface Ab <3.10 2022 08:16 AM    Hepatitis B core, IgM NONREACTIVE 2018 04:53 PM    Hep B Core Ab, total Negative 2022 08:16 AM       18:       Labs at [facility]:  22: CA 19-9 = 390  22: CEA = 12.3  22: CEA = 19.2  22: CEA = 17.9     [facility] MRD:  22: 295.97 MTM/mL  22: 2.98    Imagin/9/18 Abd/pelvis CT: IMPRESSION:  1. Interval development of a large retroperitoneal mass encircling the aorta with invasion of the left renal hilum and left adrenal gland. Several adjacent lymph nodes are seen extending into the peritoneum and underneath the  diaphragmatic natalie. This most likely represents lymphoma. 2. Several new bilateral enlarged inguinal lymph nodes also likely representing lymphoma.   3. Moderate left hydronephrosis with a delayed renal nephrogram related to decreased renal function. This is related to the invasion of the renal hilum. 11/11/18 Chest CT: IMPRESSION:  Trace left pleural effusion. Bilateral lower lobe atelectasis. Large  retroperitoneal mass lesion again demonstrated. PET 12/18/18:  FINDINGS:  HEAD/NECK: Right palatine tonsil intense hypermetabolism, max SUV 18. Multilevel  bilateral cervical adenopathy, with max SUV 12 in a left supraclavicular node. Cerebral evaluation is limited by normal intense activity. CHEST: Solitary hypermetabolic left axillary node, max SUV 11. ABDOMEN/PELVIS: Bulky retroperitoneal mass max SUV 27, with several additional  small active abdomino-pelvic nodes. Bilateral inguinal nodes with max SUV 12 on  the left. SKELETON: No foci of abnormal hypermetabolism in the axial and visualized  appendicular skeleton. IMPRESSION:   1. Right palatine tonsil tumor involvement (Deauville 5). 2. Bilateral cervical delaney involvement (Deauville 5). 3. Left axillary node involvement (Deauville 5). 4. Bulky retroperitoneal lymphoma mass and additional smaller hypermetabolic  abdomino-pelvic nodes (Deauville 5). 5. Bilateral inguinal delaney involvement (Deauville 5). Deauville Five Point Scale  1. No uptake or no residual uptake (when used interim)  2. Slight uptake, but below blood pool (mediastinum)  3. Uptake above mediastinal, but below/equal to uptake in the liver  4. Uptake slightly to moderately higher than liver  5. Markedly increased uptake or any new lesion (on response evaluation)  Each FDG-avid (or previously FDG avid) lesion is rated independently. Reference values:  Mediastinal blood pool: 2.1 SUV  Liver (background): 2.2 SUV    PET/CT 2/05/19:   IMPRESSION:  1. No Foci of Abnormal Hypermetabolism (Deauville 1).   2. Resolved activity in the right palatine tonsil, bilateral cervical nodes,left axillary node, retroperitoneal/abdominal pelvic adenopathy, bilateral inguinal nodes. Echo 2/14/19:  Normal cavity size, wall thickness and systolic function (ejection fraction normal). The muscle mass is normal. The cavity shape is normal. The estimated ejection fraction is 41 - 45%. Abnormal wall motion as described on the wall scoring diagram below. End-systolic volume is normal. Normal left ventricular strain. There is mild (grade 1) left ventricular diastolic dysfunction. Normal left ventricular diastolic pressure. End-diastolic volume is normal.    LE arterial duplex 2/22/19:  There is evidence of left groin pseudoaneurysm noted arising from distal common femoral artery, pseudo lobe measures 2.32cm x 2.58cm and pseudo neck length measuring 0.63cm. There is no evidence of hemodynamically significant left lower extremity arterial obstruction. JACLYN is 1.03 on the right and 1.02 on the left. LE arterial duplex s/p Thrombin Injection to Pseudoaneurysm 2/26/19:  Successful thrombin injection procedure of the left groin with no further flow seen. No evidence of hemodnyamically significant obstruction in the left lower extremity. Left lower extremity arterial duplex performed. Confirmed pseudoaneurysm in left groin with small neck. Following thrombin injection, no further flow seen in the pseudoaneurysm. The left common femoral, profunda femoral, femoral, popliteal, posterior tibial and anterior arteries were imaged. Mainly triphasic flow was seen with no evidence of significantly elevated velocities. Repeat LE arterial duplex 2/27/19:  Continued thrombosed left groin pseudoaneurysm following thrombin injection on 02/26/2019. No flow or color fill is identified. The hematoma measures approximately 2.1 x 2.9 cm in diameter. The common femoral, deep femoral, femoral, and popliteal arteries are patent with mainly tri-phasic flow and no significant hemodynamically obstruction is noted.     Stress 5/31/19:  Normal stress myocardial perfusion without ischemia or infact at 84% MPHR. Normal LV function. LVEF 60%. No EKG changes of ischemia at peak exercise. Normal functional capacity. PET 6/03/19: IMPRESSION: No Foci of Abnormal Hypermetabolism (Deauville 1). -MRI lumbar spine 12/18/19:  Mild disc degenerative change at L3-4 and L4-5. Mild canal stenosis at L3-4 and mild left foraminal stenosis at L4-5. Other less severe degenerative findings are as described above. Continued diminished size of retroperitoneal mass-adenopathy,  with diminished soft tissue density at the left renal hilum. -CT chest/abdomen 12/16/19:  Findings are consistent with interval response to therapy    CT c/a/p 5/28/20: IMPRESSION:  Further contraction of the retroperitoneal mantle and chris mesentery,  compatible with treatment response  Stable left hilar soft tissue mass  Slight increased splaying of the duodenum and proximal jejunum is the result, without obstruction  No evidence for recurrence of lymphoma in the chest, abdomen, or pelvis    CT c/a/p 2/13/22:  FINDINGS:   LOWER THORAX: Dependent atelectasis with otherwise clear lungs. The visualized  heart is normal in size without pericardial effusion. LIVER: No mass. BILIARY TREE: Gallbladder is unremarkable. CBD is not dilated. SPLEEN: Unremarkable. PANCREAS: No mass or ductal dilatation. ADRENALS: Unremarkable. KIDNEYS: Atrophic left kidney with mild left hydronephrosis. Right kidney is  unremarkable with no stone, enhancing mass, or other renal abnormality. STOMACH: Unremarkable. SMALL BOWEL: No dilatation or wall thickening. COLON: Circumferential wall thickening at the hepatic flexure with luminal  narrowing, adjacent mesenteric stranding, and fluid with upstream retention in  the cecum and fecalization of distal ileal contents. No dilation or other wall  thickening. APPENDIX: Unremarkable. PERITONEUM: Moderate free fluid with no pneumoperitoneum.   RETROPERITONEUM: Soft tissue stranding around the celiac and SMA and associated aorta with no discrete mass or adenopathy. Aorta is normal in size without aneurysm or dissection. REPRODUCTIVE ORGANS: Prostate and seminal vesicles appear unremarkable. URINARY BLADDER: No mass or calculus. BONES: No destructive bone lesion. ABDOMINAL WALL: No mass or hernia. ADDITIONAL COMMENTS: N/A  IMPRESSION  1. Annular mass lesion of the right colon with upstream fecal retention  concerning for primary colon neoplasm versus less likely lymphoma. 2. Soft tissue stranding around celiac axis, SMA, and abdominal aorta may  reflect infiltrative lymphoma. 3. Left renal atrophy with left hydronephrosis likely reflecting chronic  proximal ureteral obstruction. 4. Incidentals as above. 2/24/22 CT abd/pelvis at [facility]:  FINDINGS: An enteric tube with sidehole beyond the level of the lower esophageal sphincter is seen looping on itself in the proximal stomach before terminating in the mid stomach. Status post right lower quadrant diverting ileostomy for an obstructing colonic mass. Multiple loops of gas dilated small bowel remain, with a maximal diameter of 5.4 cm whereas previously measured 3.6 cm. Dilute contrast is seen within the lateral left abdominal dilated small bowel. Moderate stool burden and bowel gas opacifies the cecum, measuring 7.3 cm in diameter. No definite supine evidence of pneumoperitoneum. Lung bases: Limited evaluation of the lung bases demonstrates a cardiac silhouette within normal limits for size. A small bore central venous catheter is seen terminating in the right atrium. No focal consolidation or pleural effusion. 2/24/22 CT abd/pelvis [facility]:  IMPRESSION:  1. Status post right lower quadrant loop ileostomy. Mild diffuse dilation of small bowel proximal to the ostomy in the lower abdomen and upper pelvis concerning for at least mild to moderate partial bowel obstruction. Relatively decompressed loop ileostomy.   2. Mildly complex pelvic fluid collection in the rectovesical space, decreased in size from prior. 3. Redemonstrated ascending colon mass and findings suspicious for regional metastatic disease. 4. Redemonstrated soft tissue in the retroperitoneum with prominent lymph nodes, similar to prior examination. Differential would include metastasis, retroperitoneal fibrosis. 5. Mild atherosclerosis. 6. Subcentimeter right renal hypodensity, too small to characterize. 7. Severe compression of the left renal vein, similar to prior examination. 8. Additional findings as described above. Bones: No acute osseous abnormality. 2/24/22 CT chest [facility]:  IMPRESSION:  FINAL report. 1. No evidence of metastatic disease to the chest.  2. No enlarged lymph nodes. 3. Increasing volume loss in the lung bases which may be due to atelectasis. 4. CT abdomen and pelvis reported separately. 2/25/22 Colonoscopy at [facility]:  Impression:            - Preparation of the colon was inadequate. - Stool in the entire examined colon. - Likely malignant completely obstructing tumor at the                         hepatic flexure. Biopsied.                        - Malignant-appearing tumor in the colon. Complications:         No immediate complications. 7/19/22 CT ch/abd/pelv:  IMPRESSION  Response to treatment characterized by decrease in size of the apical core  lesion in the proximal transverse colon and decreased mucosal colic  lymphadenopathy. Persistent mesenteric lymphadenopathy and retroperitoneal/mesenteric soft tissue  which may relate to the patient's history of lymphoma. Chronic left renal atrophy with chronic left hydronephrosis. RECIST:  Target lesions:  Transverse colon mass, series 2, image 75, 27 x 26 mm, previously 49 x 45 mm. Nontarget lesions:  Transverse mesocolic lymph nodes, decreased.       Assessment & Plan:   Monet Yao Sr. is a 42 y.o. male comes in for evaluation and management of lymphoma. 1. Undifferentiated carcinoma of transverse colon, metastatic, KRAS/NRAS status unclear:  S/p diverting ileostomy due to large bowel obstruction. Colonoscopy with biopsy on 2/25/22. No obvious metastatic disease on CT imaging, but did have obvious metastatic disease to peritoneum during laparoscopy with Dr. Prabhakar Yee. I recommended FOLFOXIRI every 2 weeks. Will not give Bevacizumab given h/o MRI and tobacco use. [employer] suggests: BRCA2 and YVONNE mutations support use of Olaparib or similar PARP inhibitor in future. They also suggest testing for TMB, MSI, PDL1 to determine utility of checkpoint inhibitors. CT after 4 cycles of treatment shows response to treatment with decrease size in transverse colon mass. Supportive medications: zofran, compazine, dexamethasone, EMLA topical  -- Proceed with C5 of FOLFOXIRI with neulasta support. Added neulasta with pump removal C3 forward. -- Repeat [facility] testing as scheduled, next due 8/29/22. Looking into adding [employer] or alternate NGS testing to determine TMB, MSI, PDL1, KRAS/NRAS/BRAF. -- Check CEA every 8 weeks  -- Consider repeat colonoscopy in 4-6 months given incomplete colonoscopy. -- Repeat CT of ch/abd/pelvis after C8.   -- Follow up in 2 weeks C6 FOLFOXIRI, MD/NP visit. 2. H/o Follicular lymphoma:   Grade 3a with with bulky disease encircling the aorta, causing pain. Bone marrow negative for lymphoma, but was hypercellular. BR better than RCHOP, but based on GALLIUM study, Obinutuzumab-based induction and maintenance prolongs PFS over that seen with rituximab-based therapy. Therefore, pt started on O-CHOP regimen. He received 2 cycles with CR and was then switched to BR x 4 cycles given STEMI. Completed treatment 5/2019. PET completed 6/3/19 showed CR. CT 5/28/20 negative for disease. No extra surveillance needed at this time given metastatic colon cancer with frequent imaging.      3. Chronic lumbar back pain / anxiety / RLQ / insomnia: Left lower back pain is chronic/stable and RLQ is likely related to neoplasm/colostomy - currently managing pain on Oxycontin 10mg q12h, Oxycodone q4h and Lexapro daily. Signed pain contract on 12/28/18 and following with Dr. Sharyn Sanchez. Trazodone, melatonin not helping insomnia. Reviewed sleep hygiene. 4. CAD / HTN:   h/o STEMI 2/14/29 with TOM placed to proximal LAD. Following with Dr. Jaya Rodríguez and remains on dual antiplatelet therapy, high dose Lipitor, Metoprolol, ACEI. Received only 2 doses of cardiotoxic chemo, Doxorubicin in early 1/2019. Is overdue for follow up with Dr. Jaya Rodríguez. 5. Tobacco abuse:   Discussed benefits of quitting smoking again. Previously discussed replacing hand-to-mouth habit with another item such as Twizzlers or SlimJims. 6. BRCA2 mutation:  Will discuss with patient in future. He would benefit from genetic counseling for himself and his family. 7. Weight-loss/ fatigue:   Improving. Encouraged supplementing with 2 boost/ensures daily in addition to meals and 60 oz water daily and advised scheduled antiemetics on days 4-6.     8. H/o chemotherapy induced neutropenia:  Neulasta added with C3 forward. Advised neutropenic precautions. Goals of care: Disease is not curable, but is treatable to improve quality and duration of life. I personally saw and evaluated the patient and performed the key components of medical decision making. The history, physical exam, and documentation were performed by Eli Henao NP. I reviewed and verified the above documentation and modified it as needed. Proceed with C5 FOLFOXIRI which he is tolerating relatively well without neuropathy. Prior diarrhea has resolved.  Has fatigue and weight loss, but is supplementing meals with Boost.       Signed By: Lc Mohamud MD     July 25, 2022

## 2022-07-25 ENCOUNTER — HOSPITAL ENCOUNTER (OUTPATIENT)
Dept: INFUSION THERAPY | Age: 47
Discharge: HOME | End: 2022-07-25
Payer: MEDICAID

## 2022-07-25 ENCOUNTER — APPOINTMENT (OUTPATIENT)
Dept: INFUSION THERAPY | Age: 47
End: 2022-07-25

## 2022-07-25 ENCOUNTER — OFFICE VISIT (OUTPATIENT)
Dept: ONCOLOGY | Age: 47
End: 2022-07-25
Payer: COMMERCIAL

## 2022-07-25 VITALS
DIASTOLIC BLOOD PRESSURE: 86 MMHG | SYSTOLIC BLOOD PRESSURE: 132 MMHG | HEART RATE: 81 BPM | OXYGEN SATURATION: 98 % | BODY MASS INDEX: 20.58 KG/M2 | WEIGHT: 131.1 LBS | TEMPERATURE: 98.1 F | HEIGHT: 67 IN

## 2022-07-25 VITALS
SYSTOLIC BLOOD PRESSURE: 128 MMHG | DIASTOLIC BLOOD PRESSURE: 88 MMHG | WEIGHT: 131.1 LBS | BODY MASS INDEX: 20.58 KG/M2 | TEMPERATURE: 98.1 F | RESPIRATION RATE: 16 BRPM | OXYGEN SATURATION: 97 % | HEIGHT: 67 IN | HEART RATE: 84 BPM

## 2022-07-25 DIAGNOSIS — C18.9 COLON ADENOCARCINOMA (HCC): Primary | ICD-10-CM

## 2022-07-25 DIAGNOSIS — G47.09 OTHER INSOMNIA: ICD-10-CM

## 2022-07-25 DIAGNOSIS — T45.1X5A CHEMOTHERAPY INDUCED NEUTROPENIA (HCC): ICD-10-CM

## 2022-07-25 DIAGNOSIS — D70.1 CHEMOTHERAPY INDUCED NEUTROPENIA (HCC): ICD-10-CM

## 2022-07-25 DIAGNOSIS — C82.90 FOLLICULAR LYMPHOMA, UNSPECIFIED FOLLICULAR LYMPHOMA TYPE, UNSPECIFIED BODY REGION (HCC): ICD-10-CM

## 2022-07-25 DIAGNOSIS — Z76.89 PREVENTION OF CHEMOTHERAPY-INDUCED NEUTROPENIA: ICD-10-CM

## 2022-07-25 DIAGNOSIS — Z51.11 CHEMOTHERAPY MANAGEMENT, ENCOUNTER FOR: ICD-10-CM

## 2022-07-25 DIAGNOSIS — R53.0 NEOPLASTIC MALIGNANT RELATED FATIGUE: ICD-10-CM

## 2022-07-25 DIAGNOSIS — R10.31 RLQ ABDOMINAL PAIN: ICD-10-CM

## 2022-07-25 DIAGNOSIS — C18.4 CARCINOMA OF TRANSVERSE COLON (HCC): Primary | ICD-10-CM

## 2022-07-25 LAB
ALBUMIN SERPL-MCNC: 3.3 G/DL (ref 3.5–5)
ALBUMIN/GLOB SERPL: 0.9 {RATIO} (ref 1.1–2.2)
ALP SERPL-CCNC: 172 U/L (ref 45–117)
ALT SERPL-CCNC: 37 U/L (ref 12–78)
ANION GAP SERPL CALC-SCNC: 7 MMOL/L (ref 5–15)
AST SERPL-CCNC: 29 U/L (ref 15–37)
BASOPHILS # BLD: 0.1 K/UL (ref 0–0.1)
BASOPHILS NFR BLD: 1 % (ref 0–1)
BILIRUB SERPL-MCNC: 0.4 MG/DL (ref 0.2–1)
BUN SERPL-MCNC: 8 MG/DL (ref 6–20)
BUN/CREAT SERPL: 7 (ref 12–20)
CALCIUM SERPL-MCNC: 9.5 MG/DL (ref 8.5–10.1)
CEA SERPL-MCNC: 6 NG/ML
CHLORIDE SERPL-SCNC: 109 MMOL/L (ref 97–108)
CO2 SERPL-SCNC: 23 MMOL/L (ref 21–32)
CREAT SERPL-MCNC: 1.07 MG/DL (ref 0.7–1.3)
DIFFERENTIAL METHOD BLD: ABNORMAL
EOSINOPHIL # BLD: 0.8 K/UL (ref 0–0.4)
EOSINOPHIL NFR BLD: 14 % (ref 0–7)
ERYTHROCYTE [DISTWIDTH] IN BLOOD BY AUTOMATED COUNT: 25.9 % (ref 11.5–14.5)
GLOBULIN SER CALC-MCNC: 3.8 G/DL (ref 2–4)
GLUCOSE SERPL-MCNC: 130 MG/DL (ref 65–100)
HCT VFR BLD AUTO: 38.6 % (ref 36.6–50.3)
HGB BLD-MCNC: 12.5 G/DL (ref 12.1–17)
IMM GRANULOCYTES # BLD AUTO: 0 K/UL
IMM GRANULOCYTES NFR BLD AUTO: 0 %
LYMPHOCYTES # BLD: 1.1 K/UL (ref 0.8–3.5)
LYMPHOCYTES NFR BLD: 19 % (ref 12–49)
MCH RBC QN AUTO: 28.9 PG (ref 26–34)
MCHC RBC AUTO-ENTMCNC: 32.4 G/DL (ref 30–36.5)
MCV RBC AUTO: 89.4 FL (ref 80–99)
MONOCYTES # BLD: 0.3 K/UL (ref 0–1)
MONOCYTES NFR BLD: 6 % (ref 5–13)
NEUTS BAND NFR BLD MANUAL: 10 % (ref 0–6)
NEUTS SEG # BLD: 3.4 K/UL (ref 1.8–8)
NEUTS SEG NFR BLD: 50 % (ref 32–75)
NRBC # BLD: 0 K/UL (ref 0–0.01)
NRBC BLD-RTO: 0 PER 100 WBC
PLATELET # BLD AUTO: 174 K/UL (ref 150–400)
PMV BLD AUTO: 10.5 FL (ref 8.9–12.9)
POTASSIUM SERPL-SCNC: 3.4 MMOL/L (ref 3.5–5.1)
PROT SERPL-MCNC: 7.1 G/DL (ref 6.4–8.2)
RBC # BLD AUTO: 4.32 M/UL (ref 4.1–5.7)
RBC MORPH BLD: ABNORMAL
RBC MORPH BLD: ABNORMAL
SODIUM SERPL-SCNC: 139 MMOL/L (ref 136–145)
WBC # BLD AUTO: 5.7 K/UL (ref 4.1–11.1)

## 2022-07-25 PROCEDURE — 74011000258 HC RX REV CODE- 258: Performed by: NURSE PRACTITIONER

## 2022-07-25 PROCEDURE — 80053 COMPREHEN METABOLIC PANEL: CPT

## 2022-07-25 PROCEDURE — 96411 CHEMO IV PUSH ADDL DRUG: CPT

## 2022-07-25 PROCEDURE — 96368 THER/DIAG CONCURRENT INF: CPT

## 2022-07-25 PROCEDURE — 96375 TX/PRO/DX INJ NEW DRUG ADDON: CPT

## 2022-07-25 PROCEDURE — 96413 CHEMO IV INFUSION 1 HR: CPT

## 2022-07-25 PROCEDURE — 36415 COLL VENOUS BLD VENIPUNCTURE: CPT

## 2022-07-25 PROCEDURE — 74011250636 HC RX REV CODE- 250/636: Performed by: NURSE PRACTITIONER

## 2022-07-25 PROCEDURE — 82378 CARCINOEMBRYONIC ANTIGEN: CPT

## 2022-07-25 PROCEDURE — 96415 CHEMO IV INFUSION ADDL HR: CPT

## 2022-07-25 PROCEDURE — 99215 OFFICE O/P EST HI 40 MIN: CPT | Performed by: INTERNAL MEDICINE

## 2022-07-25 PROCEDURE — 77030012965 HC NDL HUBR BBMI -A

## 2022-07-25 PROCEDURE — 96416 CHEMO PROLONG INFUSE W/PUMP: CPT

## 2022-07-25 PROCEDURE — 96417 CHEMO IV INFUS EACH ADDL SEQ: CPT

## 2022-07-25 PROCEDURE — 74011250637 HC RX REV CODE- 250/637: Performed by: NURSE PRACTITIONER

## 2022-07-25 PROCEDURE — 85025 COMPLETE CBC W/AUTO DIFF WBC: CPT

## 2022-07-25 RX ORDER — PALONOSETRON 0.05 MG/ML
0.25 INJECTION, SOLUTION INTRAVENOUS ONCE
Status: COMPLETED | OUTPATIENT
Start: 2022-07-25 | End: 2022-07-25

## 2022-07-25 RX ORDER — POTASSIUM CHLORIDE 750 MG/1
20 TABLET, FILM COATED, EXTENDED RELEASE ORAL
Status: COMPLETED | OUTPATIENT
Start: 2022-07-25 | End: 2022-07-25

## 2022-07-25 RX ORDER — SODIUM CHLORIDE 0.9 % (FLUSH) 0.9 %
10 SYRINGE (ML) INJECTION AS NEEDED
Status: DISPENSED | OUTPATIENT
Start: 2022-07-25 | End: 2022-07-25

## 2022-07-25 RX ORDER — SODIUM CHLORIDE 9 MG/ML
10 INJECTION INTRAMUSCULAR; INTRAVENOUS; SUBCUTANEOUS AS NEEDED
Status: ACTIVE | OUTPATIENT
Start: 2022-07-25 | End: 2022-07-25

## 2022-07-25 RX ORDER — SODIUM CHLORIDE 9 MG/ML
25 INJECTION, SOLUTION INTRAVENOUS CONTINUOUS
Status: DISCONTINUED | OUTPATIENT
Start: 2022-07-25 | End: 2022-07-27 | Stop reason: HOSPADM

## 2022-07-25 RX ORDER — DEXTROSE MONOHYDRATE 50 MG/ML
25 INJECTION, SOLUTION INTRAVENOUS CONTINUOUS
Status: DISPENSED | OUTPATIENT
Start: 2022-07-25 | End: 2022-07-25

## 2022-07-25 RX ORDER — HEPARIN 100 UNIT/ML
300-500 SYRINGE INTRAVENOUS AS NEEDED
Status: ACTIVE | OUTPATIENT
Start: 2022-07-25 | End: 2022-07-25

## 2022-07-25 RX ADMIN — DEXTROSE MONOHYDRATE 281 MG: 5 INJECTION, SOLUTION INTRAVENOUS at 10:30

## 2022-07-25 RX ADMIN — OXALIPLATIN 144.5 MG: 5 INJECTION, SOLUTION INTRAVENOUS at 12:25

## 2022-07-25 RX ADMIN — FLUOROURACIL 4080 MG: 50 INJECTION, SOLUTION INTRAVENOUS at 14:46

## 2022-07-25 RX ADMIN — DEXTROSE MONOHYDRATE 25 ML/HR: 50 INJECTION, SOLUTION INTRAVENOUS at 09:31

## 2022-07-25 RX ADMIN — POTASSIUM CHLORIDE 20 MEQ: 750 TABLET, FILM COATED, EXTENDED RELEASE ORAL at 09:31

## 2022-07-25 RX ADMIN — LEUCOVORIN CALCIUM 680 MG: 200 INJECTION, POWDER, LYOPHILIZED, FOR SUSPENSION INTRAMUSCULAR; INTRAVENOUS at 12:25

## 2022-07-25 RX ADMIN — DEXAMETHASONE SODIUM PHOSPHATE 12 MG: 4 INJECTION, SOLUTION INTRA-ARTICULAR; INTRALESIONAL; INTRAMUSCULAR; INTRAVENOUS; SOFT TISSUE at 09:36

## 2022-07-25 RX ADMIN — PALONOSETRON 0.25 MG: 0.05 INJECTION, SOLUTION INTRAVENOUS at 09:35

## 2022-07-25 NOTE — PROGRESS NOTES
Danna Brar is a 42 y.o. male here for follow up of colon cancer. Patient with complaints of lower right back pain, rates as a 5 out of 10.

## 2022-07-25 NOTE — PROGRESS NOTES
[facility] Progress Note    Date: 2022    Name: Alex Hernandez MRN: [id number]         : 1975    Mr. Nati Ta Arrived ambulatory and in no distress for C5 D1 of Folfoxiri Regimen. Assessment was completed by Lisa Hudson RN, no acute issues at this time, no new complaints voiced. Left chest wall port accessed without difficulty, labs drawn & sent for processing. Chemotherapy Flowsheet 2022   Cycle C5D1   Date 2022   Drug / Regimen Folfoxiri   Post Hydration -   Pre Meds given   Notes given     Patient proceed to appointment with Dr. Gil Keller. Mr. Girma Zarate vitals were reviewed. Patient Vitals for the past 12 hrs:   Temp Pulse Resp BP SpO2   22 1449 -- 84 -- 128/88 97 %   22 0747 98.1 °F (36.7 °C) 81 16 132/86 98 %       Lab results were obtained and reviewed. Recent Results (from the past 12 hour(s))   CBC WITH AUTOMATED DIFF    Collection Time: 22  7:55 AM   Result Value Ref Range    WBC 5.7 4.1 - 11.1 K/uL    RBC 4.32 4.10 - 5.70 M/uL    HGB 12.5 12.1 - 17.0 g/dL    HCT 38.6 36.6 - 50.3 %    MCV 89.4 80.0 - 99.0 FL    MCH 28.9 26.0 - 34.0 PG    MCHC 32.4 30.0 - 36.5 g/dL    RDW 25.9 (H) 11.5 - 14.5 %    PLATELET 221 344 - 465 K/uL    MPV 10.5 8.9 - 12.9 FL    NRBC 0.0 0  WBC    ABSOLUTE NRBC 0.00 0.00 - 0.01 K/uL    NEUTROPHILS 50 32 - 75 %    BAND NEUTROPHILS 10 (H) 0 - 6 %    LYMPHOCYTES 19 12 - 49 %    MONOCYTES 6 5 - 13 %    EOSINOPHILS 14 (H) 0 - 7 %    BASOPHILS 1 0 - 1 %    IMMATURE GRANULOCYTES 0 %    ABS. NEUTROPHILS 3.4 1.8 - 8.0 K/UL    ABS. LYMPHOCYTES 1.1 0.8 - 3.5 K/UL    ABS. MONOCYTES 0.3 0.0 - 1.0 K/UL    ABS. EOSINOPHILS 0.8 (H) 0.0 - 0.4 K/UL    ABS. BASOPHILS 0.1 0.0 - 0.1 K/UL    ABS. IMM.  GRANS. 0.0 K/UL    DF MANUAL      RBC COMMENTS ANISOCYTOSIS  3+        RBC COMMENTS OVALOCYTES  PRESENT       METABOLIC PANEL, COMPREHENSIVE    Collection Time: 22  7:55 AM   Result Value Ref Range    Sodium 139 136 - 145 mmol/L    Potassium 3.4 (L) 3.5 - 5.1 mmol/L    Chloride 109 (H) 97 - 108 mmol/L    CO2 23 21 - 32 mmol/L    Anion gap 7 5 - 15 mmol/L    Glucose 130 (H) 65 - 100 mg/dL    BUN 8 6 - 20 MG/DL    Creatinine 1.07 0.70 - 1.30 MG/DL    BUN/Creatinine ratio 7 (L) 12 - 20      GFR est AA >60 >60 ml/min/1.73m2    GFR est non-AA >60 >60 ml/min/1.73m2    Calcium 9.5 8.5 - 10.1 MG/DL    Bilirubin, total 0.4 0.2 - 1.0 MG/DL    ALT (SGPT) 37 12 - 78 U/L    AST (SGOT) 29 15 - 37 U/L    Alk.  phosphatase 172 (H) 45 - 117 U/L    Protein, total 7.1 6.4 - 8.2 g/dL    Albumin 3.3 (L) 3.5 - 5.0 g/dL    Globulin 3.8 2.0 - 4.0 g/dL    A-G Ratio 0.9 (L) 1.1 - 2.2         Medications:  Medications Administered       dexamethasone (DECADRON) 12 mg in 0.9% sodium chloride 50 mL IVPB       Admin Date  07/25/2022 Action  New Bag Dose  12 mg Route  IntraVENous Administered By  Naveen Black RN              dextrose 5% infusion       Admin Date  07/25/2022 Action  New Bag Dose  25 mL/hr Rate  25 mL/hr Route  IntraVENous Administered By  Naveen Black RN              fluorouraciL (ADRUCIL) 4,080 mg in 0.9% sodium chloride 100 mL CADD Cassette       Admin Date  07/25/2022 Action  New Bag Dose  4,080 mg Rate  2.1 mL/hr Route  IntraVENous Administered By  Naveen Black RN              irinotecan (CAMPTOSAR) 281 mg in dextrose 5% 250 mL, overfill volume 25 mL chemo infusion       Admin Date  07/25/2022 Action  New Bag Dose  281 mg Rate  192.7 mL/hr Route  IntraVENous Administered By  Naveen Black RN              leucovorin (WELLCOVORIN) 680 mg in dextrose 5% 250 mL, overfill volume 25 mL IVPB       Admin Date  07/25/2022 Action  New Bag Dose  680 mg Rate  154.5 mL/hr Route  IntraVENous Administered By  Naveen Black RN              oxaliplatin (ELOXATIN) 144.5 mg in dextrose 5% 250 mL, overfill volume 25 mL chemo infusion       Admin Date  07/25/2022 Action  New Bag Dose  144.5 mg Rate  152 mL/hr Route  IntraVENous Administered By  Racheal Tristan, Zaida Zuleta RN              palonosetron HCl (ALOXI) injection 0.25 mg       Admin Date  07/25/2022 Action  Given Dose  0.25 mg Route  IntraVENous Administered By  Naya Romero RN              potassium chloride SR (KLOR-CON 10) tablet 20 mEq       Admin Date  07/25/2022 Action  Given Dose  20 mEq Route  Oral Administered By  Naya Romero RN                    Two nurses verified prior to administering:  Drug name  Drug dose  Infusion volume or drug volume when prepared in a syringe  Rate of administration  Route of administration  Expiration dates and/or times  Appearance and physical integrity of the drugs  Rate set on infusion pump, when used  Sequencing of drug administration      Mr. Vizcarra Felt tolerated treatment well and was discharged from [place] [address] in stable condition at 1450. Port de-accessed, flushed & heparinized per protocol. He is to return on 07/27/2022 for his next appointment.     Mitch Garcia RN  July 25, 2022

## 2022-07-26 ENCOUNTER — TELEPHONE (OUTPATIENT)
Dept: PALLATIVE CARE | Age: 47
End: 2022-07-26

## 2022-07-26 DIAGNOSIS — C18.9 COLON ADENOCARCINOMA (HCC): ICD-10-CM

## 2022-07-26 DIAGNOSIS — C78.6 PERITONEAL CARCINOMATOSIS (HCC): ICD-10-CM

## 2022-07-26 DIAGNOSIS — R10.84 ABDOMINAL PAIN, GENERALIZED: ICD-10-CM

## 2022-07-26 RX ORDER — OXYCODONE HYDROCHLORIDE 10 MG/1
10 TABLET ORAL
Qty: 90 TABLET | Refills: 0 | Status: SHIPPED | OUTPATIENT
Start: 2022-07-26 | End: 2022-08-09 | Stop reason: SDUPTHER

## 2022-07-26 NOTE — TELEPHONE ENCOUNTER
Triage for Controlled Substance Refill Request    Pain Diagnosis: _Colon adenocarcinoma ([facility] [address] (C18.9); Abdominal pain, generalized (R10.84); Peritoneal carcinomatosis ([facility] [address] (C78.6)    Last Outpatient Visit: _6/20/2022    Next Outpatient Visit: _8/1/2022    Reason for refill needed outside of office visit? -Appointment not scheduled prior to need for scheduled refill      Pharmacy: _[facility]/pharmacy #[id number]     Medication:oxyCODONE IR (ROXICODONE) 10 mg tab immediate release tablet    Dose and directions: Take 1 Tablet by mouth every four (4) hours as needed for Pain for up to 15 days  Number dispensed:90  Date filled ( or Pharmacy):7/12/2022  # left: 1 day        reviewed: _yes    Date of Urine Drug Screen:  _    Opioid Safety Handout given:  _yes    Appropriate for refill:  _yes    Action:  _ Medication pending

## 2022-07-26 NOTE — TELEPHONE ENCOUNTER
Patient called requesting refill on Oxycodone 10 mg, 4 pill left to be sent to [facility] on [address].

## 2022-07-27 ENCOUNTER — APPOINTMENT (OUTPATIENT)
Dept: INFUSION THERAPY | Age: 47
End: 2022-07-27

## 2022-07-27 ENCOUNTER — HOSPITAL ENCOUNTER (OUTPATIENT)
Dept: INFUSION THERAPY | Age: 47
Discharge: HOME | End: 2022-07-27
Payer: COMMERCIAL

## 2022-07-27 DIAGNOSIS — Z76.89 PREVENTION OF CHEMOTHERAPY-INDUCED NEUTROPENIA: ICD-10-CM

## 2022-07-27 DIAGNOSIS — C18.9 COLON ADENOCARCINOMA (HCC): ICD-10-CM

## 2022-07-27 DIAGNOSIS — D70.1 CHEMOTHERAPY INDUCED NEUTROPENIA (HCC): Primary | ICD-10-CM

## 2022-07-27 DIAGNOSIS — C82.90 FOLLICULAR LYMPHOMA, UNSPECIFIED FOLLICULAR LYMPHOMA TYPE, UNSPECIFIED BODY REGION (HCC): ICD-10-CM

## 2022-07-27 DIAGNOSIS — T45.1X5A CHEMOTHERAPY INDUCED NEUTROPENIA (HCC): Primary | ICD-10-CM

## 2022-07-27 PROCEDURE — 96523 IRRIG DRUG DELIVERY DEVICE: CPT

## 2022-07-27 PROCEDURE — 74011000250 HC RX REV CODE- 250: Performed by: INTERNAL MEDICINE

## 2022-07-27 PROCEDURE — 74011250636 HC RX REV CODE- 250/636: Performed by: INTERNAL MEDICINE

## 2022-07-27 RX ORDER — SODIUM CHLORIDE 9 MG/ML
10 INJECTION INTRAMUSCULAR; INTRAVENOUS; SUBCUTANEOUS AS NEEDED
Status: DISCONTINUED | OUTPATIENT
Start: 2022-07-27 | End: 2022-07-28 | Stop reason: HOSPADM

## 2022-07-27 RX ORDER — HEPARIN 100 UNIT/ML
300-500 SYRINGE INTRAVENOUS AS NEEDED
Status: DISCONTINUED | OUTPATIENT
Start: 2022-07-27 | End: 2022-07-28 | Stop reason: HOSPADM

## 2022-07-27 RX ORDER — SODIUM CHLORIDE 0.9 % (FLUSH) 0.9 %
10 SYRINGE (ML) INJECTION AS NEEDED
Status: DISCONTINUED | OUTPATIENT
Start: 2022-07-27 | End: 2022-07-28 | Stop reason: HOSPADM

## 2022-07-27 RX ADMIN — HEPARIN 500 UNITS: 100 SYRINGE at 15:03

## 2022-07-27 RX ADMIN — Medication 10 ML: at 15:03

## 2022-07-27 NOTE — PROGRESS NOTES
Outpatient Infusion Center Short Visit Progress Note    1500 Patient admitted to [facility] for Pump removal ambulatory in stable condition. Assessment completed. No new concerns voiced. Patient states pump completed at 1435. No visible medicine left in CADD pump. Port flushed with positive blood return, heparinized, and de-accessed per protocol. Per [facility] l[facility] Np. They are doing a trail run with no neulasta this week per insurance issues. [facility] will update treatment plan if patient becomes neutropenic again. Medications:  Medications Administered       heparin (porcine) pf 300-500 Units       Admin Date  07/27/2022 Action  Given Dose  500 Units Route  InterCATHeter Administered By  Brandon Alonso, RN              sodium chloride (NS) flush 10 mL       Admin Date  07/27/2022 Action  Given Dose  10 mL Route  IntraVENous Administered By  Brandon Alonso, ADA                    7583 Patient tolerated treatment well. Patient discharged from [place] [address] ambulatory in no distress at 1508. Patient aware of next appointment.     Future Appointments   Date Time Provider Dahlia Epps   8/1/2022  8:30 AM Marcel Felton MD [facility] BS AMB   8/8/2022  7:30 AM [facility] [place]   8/8/2022  8:15 AM Galilea Walker MD ON[facility] BS AMB   8/10/2022  2:00 PM [facility] [place]   8/22/2022  7:30 [facility]   8/24/2022  2:00 PM SS INF6 CH4 <1H [facility] Reina Santizo

## 2022-07-29 ENCOUNTER — TELEPHONE (OUTPATIENT)
Dept: PALLATIVE CARE | Age: 47
End: 2022-07-29

## 2022-07-29 NOTE — PROGRESS NOTES
Palliative Medicine Outpatient Services  Ragan: [phone] ([phone])    Patient Name: Jeramy Bailey. YOB: 1975     Date of Current Visit: 8/1/22  Location of Current Visit:    [] [facility] Office  [x] [facility] Office  [] [address] [place] [address]  [] Home  [] Other:  televisit    Date of Initial Visit: 2/19/19   Referral from: David Simon MD  Primary Care Physician: David Acosta MD      SUMMARY:   Jeramy Zaldivar is a 42y.o. year old with high-grade follicular lymphoma, who was referred to Palliative Medicine by Dr. Eli Jay for symptom management and supportive care. He was diagnosed in 11/2018 after presenting with back pain, treated with systemic chemotherapy with complete response. He suffered cardiac arrest during cycle #3 due to previously undiagnosed severe stenosis of the LAD s/p PTCA and stent. He was diagnosed with poorly differentiated carcinoma of the colon (no evidence of metastatic disease) in 2/14/22 and underwent loop ileostomy at [facility] on 2/19/22. He underwent exploratory laparoscopy in 4/2022 which revealed a large tumor at the hepatic flexure peritoneal carcinomatosis. He is currently being treated with systemic chemotherapy under the care of Dr. uSrekha Schulz. The patients social history includes: he is single. He lives in [place] with his girlfriend, Shannon Redman, and their 12-month old son. [facility] Devis He has 3 teenage children who live with their mother and with whom he has close contact. 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 Palliative Medicine is addressing the following current patient/family concerns: abdominal pain related to malignancy; anxiety, depression; left mid- and low back pain, poor appetite, fatigue, advanced care planning. Initial Referral Intake note from Chika Laurent RN reviewed prior to visit   PALLIATIVE DIAGNOSES:       ICD-10-CM ICD-9-CM    1.  Abdominal pain, generalized  R10.84 789.07 fentaNYL (Duragesic) 12 mcg/hr patch 2. Right arm pain  M79.601 729.5       3. Anxiety  F41.9 300.00       4. Reactive depression  F32.9 300.4       5. Poor appetite  R63.0 783.0       6. Nausea without vomiting  R11.0 787.02       7. Other fatigue  R53.83 780.79       8. Malignant neoplasm of hepatic flexure (HCC)  C18.3 153.0 fentaNYL (Duragesic) 12 mcg/hr patch      9. [facility]  C85.90 202.80       10. History of cardiac arrest  Z86.74 V12.53                PLAN:     Patient Instructions   Dear Jossue Fletcher. ,    It was a pleasure seeing you today in [facility]. We will see you again in 5 to 6 weeks for an office visit to coordinate with your infusion. If labs or imaging tests have been ordered for you today, please call the office  at [phone] 48 hours after completion to obtain the results. Your described symptoms were: Fatigue: 5 Drowsiness: 4   Depression: 2 Pain: 6   Anxiety: 8 Nausea: 0 (Previous week - 5)   Anorexia: 5 Dyspnea: 1   Best Well-Being: 3 Constipation: No   Other Problem (Comment): 0       This is the plan we talked about:    1. Abdominal pain  -Continue oxycodone 10-mg every 4 hours as needed  -Start fentanyl patch 12-mcg every 72 hours  -You keep your opioids in a safe on a high shelf in your closet    2. Right arm pain  -The etiology (cause) of this pain is unclear at this point  -Dr. Herrera Signs has referred you for a port study  -Please follow-up with making an appointment with your cardiologist    3. Depression/anxiety  -Continue Lexapro 20-mg daily  -I'm avoiding benzodiazepines due to synergistic effect with opioids    4. Poor appetite/weight loss  -Continue eating your one good meal a day  -Try to eat smaller amounts of food throughout the day (4 to 5 times) to keep up with your nutritional needs  -You've been maintaining your weight in the 129-132 pound range     5. Fatigue  -This is chronic and unchanged   -You're sleeping about 5 hours at night    6. Nausea  -Continue ondansetron 8-mg every 8 hours as needed  -Continue prochlorperazine 10-mg every 6 hours as needed    7. Colon mass  -You're receiving chemotherapy under the care of Dr. Pavan Davidson  -Your recent scans showed response to your therapy    This is what you have shared with us about Advance Care Planning:      Primary Decision Maker: Dominic De La Fuenteienangelique - [phone]  Advance Care Planning 2022   Patient's Healthcare Decision Maker is: -   Confirm Advance Directive None   Patient Would Like to Complete Advance Directive -   Does the patient have other document types -           The Palliative Medicine Team is here to support you and your family. Sincerely,      Evelin Woodall MD and the Palliative Medicine Team     GOALS OF CARE / TREATMENT PREFERENCES:   [====Goals of Care====]  GOALS OF CARE:  Patient / health care proxy stated goals: See Patient Instructions / Summary    TREATMENT PREFERENCES:   Code Status:  [x] Attempt Resuscitation       [] Do Not Attempt Resuscitation    Advance Care Planning:  [x] The Pall Med Interdisciplinary Team has updated the ACP Navigator with Decision Maker and Patient Capacity      Primary Decision Maker: Dominic De La Fuenteienangelique - [phone]    [] Named in a scanned document   [x] Legal Next of Kin  [] Guardian    Other:  (If patient appropriate for POST, consider using PALLPOST smart phrase here)    The palliative care team has discussed with patient / health care proxy about goals of care / treatment preferences for patient.  [====Goals of Care====]     PRESCRIPTIONS GIVEN:     Medications Ordered Today   Medications    fentaNYL (Duragesic) 12 mcg/hr patch     Si Patch by TransDERmal route every seventy-two (72) hours for 30 days. Max Daily Amount: 1 Patch.      Dispense:  10 Patch     Refill:  0             FOLLOW UP:     Future Appointments   Date Time Provider Dahlia Epps   2022  7:30 AM SS INF4 CH2 >7H [facility] 8/8/2022  8:15 AM Gates Angle, Stacia Crigler, MD ONCSF BS [facility]   8/10/2022  2:00 PM SS INF6 CH4 <1H [facility]   8/22/2022  7:30 AM SS INF4 CH2 >7H [facility]   8/22/2022  8:30 AM Janae Montalvo MD PCS BS [facility]   8/24/2022  2:00 PM SS INF6 CH4 <1H [facility]           PHYSICIANS INVOLVED IN CARE:   Patient Care Team:  Tom Deng MD as PCP - General (Family Medicine)  Deloris Duncan MD (Hematology and Oncology)  Burt Torres MD as Physician (Palliative Medicine)  Burt Torres MD as Physician (Palliative Medicine)       HISTORY:   Reviewed patient-completed ESAS and advance care planning form. Reviewed patient record in prescription monitoring program.    CHIEF COMPLAINT:   Chief Complaint   Patient presents with    Abdominal Pain         HPI/SUBJECTIVE:    The patient is: [x] Verbal / [] Nonverbal     His abdominal pain remains about a 6/10 most of the time. The pain is worse in the morning when he wakes up. His insurance company stopped paying for OxyContin. He's taking 5 to 6 immediate-release oxycodone/24 hours. He has right arm pain for several days after chemo. He occasionally experiences chest tightness. His anxiety is about the same. See Plan/Patient Instructions for additional interval history      From visit 3/2/22:  He started having abdominal pain about a month ago. Then he started feeling nauseous. He had trouble with bowel movements, feeling like he wasn't completing emptying his bowels. He went to the ED on 2/14, CT scan showed mass in his colon. He was hospitalized at [facility] 2/15-2/25 for colonoscopy and loop ileostomy. He's still having pain in his abdomen. He's been taking 2 oxycodone every 4 to 6 hours which eases the pain to ~5/10 which is tolerable. He's always anxious. He continues to take Lexapro. He's eating, not as much as he used to. He ate 2 PBJs yesterday. He had mild nausea for a few days after he came home.     He's passing formed stool in his ostomy bag daily. Home health is coming to his home. He sees Dr. Gil Keller next week. From IV 2/19/19:  He has a lot of anxiety. He's lived with anxiety for a long time, sometimes it's been worse than others, like when he lost his job and lost his insurance. He took Wellbutrin then which made him more anxious and depressed. He's had more anxiety since he was diagnosed with lymphoma. He's been taking lorazepam and it doesn't help at all, even when he tried taking 2 pills. This is his biggest problem now. He feels depressed but it's not as bad as the anxiety. He has pain in his back, that's what brought him to the hospital in November. He's been taking oxycodone which helps some. The groin pain started after his operation (cardiac cath) in the hospital last week. He expects that will get better as it heals. His pain doesn't bother him too much, not like the anxiety. His appetite is getting better. He's lost ~30# since last fall but that's leveled off now. He's moving his bowels regularly. He sometimes gets short of breath but not as much as used to. He doesn't have chest pain, never did. He sleeps well at night. He doesn't have the energy to do much during the day. He lives with his father. He sees his three teen-age children frequently (they live with their mother). His children give him a lot of strength.         Clinical Pain Assessment (nonverbal scale for nonverbal patients):   [++++ Clinical Pain Assessment++++]  [++++Pain Severity++++]: Pain: 6  [++++Pain Character++++]: stabbing pain in back  [++++Pain Duration++++]: months for back pain, weeks for groin pain  [++++Pain Effect++++]: little  [++++Pain Factors++++]: oxycodone helps with back pain, groin pain elicited by standing and walking  [++++Pain Frequency++++]: constant back pain with varying intensity  [++++Pain Location++++]: left lower back  [++++ Clinical Pain Assessment++++]    [++++ Clinical Pain Assessment++++]  [++++Pain Severity++++]: Pain: 6  [++++Pain Character++++]: deep, aching  [++++Pain Duration++++]: since 2/2022  [++++Pain Effect++++]: limits function, emotional distress  [++++Pain Factors++++]: unable to identify provoking factors  [++++Pain Frequency++++]: constant  [++++Pain Location++++]: right lower abdomen  [++++ Clinical Pain Assessment++++]         FUNCTIONAL ASSESSMENT:     Palliative Performance Scale (PPS):  PPS: 70       PSYCHOSOCIAL/SPIRITUAL SCREENING:     Any spiritual / Muslim concerns:  [] Yes /  [x] No    Caregiver Burnout:  [] Yes /  [x] No /  [] No Caregiver Present      Anticipatory grief assessment:   [x] Normal  / [] Maladaptive       ESAS Anxiety: Anxiety: 8    ESAS Depression: Depression: 2       REVIEW OF SYSTEMS:     The following systems were [x] reviewed / [] unable to be reviewed  Systems: constitutional, ears/nose/mouth/throat, respiratory, gastrointestinal, genitourinary, musculoskeletal, integumentary, neurologic, psychiatric, endocrine. Positive findings noted below. Modified ESAS Completed by: provider   Fatigue: 5 Drowsiness: 4   Depression: 2 Pain: 6   Anxiety: 8 Nausea: 0 (Previous week - 5)   Anorexia: 5 Dyspnea: 1   Best Well-Being: 3 Constipation: No   Other Problem (Comment): 0          PHYSICAL EXAM:     Wt Readings from Last 3 Encounters:   08/01/22 128 lb (58.1 kg)   07/25/22 131 lb 1.6 oz (59.5 kg)   07/25/22 131 lb 1.6 oz (59.5 kg)     Blood pressure 134/88, pulse 87, temperature 97.1 °F (36.2 °C), temperature source Oral, resp. rate 18, height 5' 7\" (1.702 m), weight 128 lb (58.1 kg), SpO2 97 %.   Last bowel movement: See Nursing Note         Constitutional: appears fatigued, ill  Eyes: pupils equal, anicteric  ENMT: no nasal discharge, moist mucous membranes  Cardiovascular: regular rhythm, distal pulses intact; right ulnar and radial pulses intact  Respiratory: breathing not labored, symmetric  Gastrointestinal: soft non-tender, +bowel sounds  Musculoskeletal: no deformity, no tenderness to palpation  Skin: warm, dry  Neurologic: following commands, moving all extremities  Psychiatric: full affect, no hallucinations  Other:       HISTORY:     Past Medical History:   Diagnosis Date    Anxiety     Anxiety and depression     Cancer ([facility]     lymphoma Nov 2018 receiving chemo    Cancer ([facility] 02/2022    COLON    Chronic pain     lower back- lymphoma    Hyperlipidemia     Hypertension     NO MEDICATION FOR PAST 9 MONTHS    Lymphadenopathy 11/12/2018    Seizures ([facility] CHILDHOOD    NEVER TOOK MEDICATION - \"GREW OUT OF THEM\"      Past Surgical History:   Procedure Laterality Date    HX COLONOSCOPY      HX HEART CATHETERIZATION  02/2019    HX ILEOSTOMY      HX OTHER SURGICAL  02/2019    cardiac stent    IR INJECTION PSEUDOANEURYSM  2/26/2019    VT ABDOMEN SURGERY PROC UNLISTED  02/2022    COLON RESECTION WITH ILEOSTOMY    VT CARDIAC SURG PROCEDURE UNLIST  2019    STENT X1      Family History   Problem Relation Age of Onset    Hypertension Father     Diabetes Father     Cancer Father         PROSTATE    Diabetes Mother     No Known Problems Brother     No Known Problems Brother     Anesth Problems Neg Hx       History reviewed, no pertinent family history. Social History     Tobacco Use    Smoking status: Every Day     Packs/day: 0.50     Years: 20.00     Pack years: 10.00     Types: Cigarettes    Smokeless tobacco: Never    Tobacco comments:     \"STOP SMOKING\" PACKET GIVEN TO PATIENT   Substance Use Topics    Alcohol use: No     No Known Allergies   Current Outpatient Medications   Medication Sig    fentaNYL (Duragesic) 12 mcg/hr patch 1 Patch by TransDERmal route every seventy-two (72) hours for 30 days. Max Daily Amount: 1 Patch. oxyCODONE IR (ROXICODONE) 10 mg tab immediate release tablet Take 1 Tablet by mouth every four (4) hours as needed for Pain for up to 15 days.  Max Daily Amount: 60 mg.    docusate sodium (COLACE) 100 mg capsule Take 1 Capsule by mouth two (2) times a day for 90 days. loperamide (Imodium A-D) 2 mg capsule Take 1 Capsule by mouth four (4) times daily as needed for Diarrhea. ondansetron hcl (ZOFRAN) 8 mg tablet Take 1 Tablet by mouth every eight (8) hours as needed for Nausea or Vomiting. prochlorperazine (Compazine) 10 mg tablet Take 1 Tablet by mouth every six (6) hours as needed for Nausea or Vomiting.    lidocaine-prilocaine (EMLA) topical cream Apply a dime size amount to port site 30 minutes before treatment to prevent pain. dexAMETHasone (DECADRON) 4 mg tablet Take 8mg (2 x tabs) on days 2 and 3 after treatment to prevent nausea. Take in the AM with food. multivitamin (ONE A DAY) tablet Take 1 Tablet by mouth daily. atorvastatin (LIPITOR) 40 mg tablet TAKE 1 TAB BY MOUTH NIGHTLY. INDICATIONS: TREATMENT TO SLOW PROGRESSION OF CORONARY ARTERY DISEASE    senna-docusate (PERICOLACE) 8.6-50 mg per tablet Take 1 Tablet by mouth daily as needed for Constipation. (Patient not taking: No sig reported)     No current facility-administered medications for this visit. LAB DATA REVIEWED:     Lab Results   Component Value Date/Time    WBC 5.7 07/25/2022 07:55 AM    HGB 12.5 07/25/2022 07:55 AM    PLATELET 237 25/37/2670 07:55 AM     Lab Results   Component Value Date/Time    Sodium 139 07/25/2022 07:55 AM    Potassium 3.4 (L) 07/25/2022 07:55 AM    Chloride 109 (H) 07/25/2022 07:55 AM    CO2 23 07/25/2022 07:55 AM    BUN 8 07/25/2022 07:55 AM    Creatinine 1.07 07/25/2022 07:55 AM    Calcium 9.5 07/25/2022 07:55 AM    Magnesium 1.7 01/12/2019 04:05 AM    Phosphorus 2.0 (L) 01/12/2019 04:05 AM      Lab Results   Component Value Date/Time    Alk.  phosphatase 172 (H) 07/25/2022 07:55 AM    Protein, total 7.1 07/25/2022 07:55 AM    Albumin 3.3 (L) 07/25/2022 07:55 AM    Globulin 3.8 07/25/2022 07:55 AM     Lab Results   Component Value Date/Time    INR 1.1 02/22/2019 08:18 PM    Prothrombin time 10.8 02/22/2019 08:18 PM aPTT 27.8 02/22/2019 08:18 PM      Lab Results   Component Value Date/Time    Iron 18 (L) 05/06/2022 11:13 AM    TIBC 173 (L) 05/06/2022 11:13 AM    Iron % saturation 10 (L) 05/06/2022 11:13 AM    Ferritin 426 (H) 05/06/2022 11:13 AM          MRI lumbar spine 12/18/19:  Congenital narrowing of the lumbar canal. Vertebral body heights are maintained. Marrow signal is normal.     The conus medullaris terminates at T12/L1. Signal and caliber of the distal  spinal cord are within normal limits. There is no pathologic intrathecal  enhancement. The paraspinal soft tissues are within normal limits. Lower thoracic spine: No herniation or stenosis. L1-L2: No herniation or stenosis. L2-L3: No herniation or stenosis. L3-L4: Mild facet arthropathy. Minimal central disc protrusion. Mild canal  stenosis. Foramina are patent  L4-L5: Desiccation. Mild facet arthropathy. Canal demonstrates minimal stenosis. There is an annular ligament tear far laterally on the left. Mild left foraminal  stenosis. L5-S1: Mild facet arthropathy. Canal and foramina are patent. IMPRESSION:  Mild disc degenerative change at L3-4 and L4-5. Mild canal stenosis at L3-4 and mild left foraminal stenosis at L4-5. Other less severe degenerative findings are as described above. CT chest/abdomen 12/16/19:  THYROID: No nodule. MEDIASTINUM: No mass or lymphadenopathy. Port catheter in place on the right. Catheter tip in appropriate position. SB: No mass or lymphadenopathy. THORACIC AORTA: No dissection or aneurysm. MAIN PULMONARY ARTERY: Unremarkable  TRACHEA/BRONCHI: Unremarkable  ESOPHAGUS: No wall thickening or dilatation. HEART: Normal in size. PLEURA: No effusion or pneumothorax. LUNGS: Bibasilar atelectasis is noted. Graylon Douglas LIVER: No mass or biliary dilatation. GALLBLADDER: Layering contrast versus cholelithiasis. SPLEEN: No mass. PANCREAS: No mass or ductal dilatation.     ADRENALS: The left adrenal gland is elevated by the retroperitoneal mass. The    right is unremarkable. KIDNEYS: There is diminished soft tissue density at the level of the left renal  hilum. There is increased left renal cortical atrophy. STOMACH: Unremarkable. SMALL BOWEL: No dilatation or wall thickening. COLON: No dilatation or wall thickening. APPENDIX: Unremarkable. PERITONEUM: No ascites or pneumoperitoneum. RETROPERITONEUM:   Diminished size of retroperitoneal mass. Diminished in size with margins difficult to fully ascertain. 2-62 35 x 50 mm previously 71 x 94 mm.     2-67 left periaortic adenopathy 25 x 17 mm  The SMA, celiac, and LIZZY are remain encased, diminished attenuation of these  vessels. REPRODUCTIVE ORGANS: The prostate and seminal vesicles are unremarkable. URINARY  BLADDER: Unremarkable  BONES: No destructive bone lesion. ADDITIONAL COMMENTS: Resolved left inguinal pseudoaneurysm. Peg Russell IMPRESSION:    Baseline research examination. Findings are consistent with interval response to therapy. Continued diminished size of retroperitoneal mass-adenopathy,  with diminished soft tissue density at the left renal hilum. CT chest/abdomen/pelvis 2/14/22:  1. Annular mass lesion of the right colon with upstream fecal retention  concerning for primary colon neoplasm versus less likely lymphoma. 2. Soft tissue stranding around celiac axis, SMA, and abdominal aorta may  reflect infiltrative lymphoma. 3. Left renal atrophy with left hydronephrosis likely reflecting chronic  proximal ureteral obstruction. 4. Incidentals as above. CT chest/abdomen/pelvis 7/19/22:  Response to treatment characterized by decrease in size of the apical core  lesion in the proximal transverse colon and decreased mucosal colic  lymphadenopathy. Persistent mesenteric lymphadenopathy and retroperitoneal/mesenteric soft tissue  which may relate to the patient's history of lymphoma.   Chronic left renal atrophy with chronic left hydronephrosis.       CONTROLLED SUBSTANCES SAFETY ASSESSMENT (IF ON CONTROLLED SUBSTANCES):     Reviewed opioid safety handout:  [x] Yes   [] No  24 hour opioid dose >150mg morphine equivalent/day:  [] Yes   [x] No  Benzodiazepines:  [x] Yes   [] No  Sleep apnea:  [] Yes   [x] No  Urine Toxicology Testing within last 6 months:  [] Yes   [x] No  History of or new aberrant medication taking behaviors:  [] Yes   [x] No  Has Narcan been prescribed [x] Yes   [] No          Total time:   Counseling / coordination time:   > 50% counseling / coordination?:

## 2022-08-01 ENCOUNTER — TELEPHONE (OUTPATIENT)
Dept: PALLATIVE CARE | Age: 47
End: 2022-08-01

## 2022-08-01 ENCOUNTER — APPOINTMENT (OUTPATIENT)
Dept: INFUSION THERAPY | Age: 47
End: 2022-08-01

## 2022-08-01 ENCOUNTER — OFFICE VISIT (OUTPATIENT)
Dept: PALLATIVE CARE | Age: 47
End: 2022-08-01
Payer: MEDICAID

## 2022-08-01 VITALS
SYSTOLIC BLOOD PRESSURE: 134 MMHG | WEIGHT: 128 LBS | OXYGEN SATURATION: 97 % | BODY MASS INDEX: 20.09 KG/M2 | HEART RATE: 87 BPM | TEMPERATURE: 97.1 F | RESPIRATION RATE: 18 BRPM | DIASTOLIC BLOOD PRESSURE: 88 MMHG | HEIGHT: 67 IN

## 2022-08-01 DIAGNOSIS — C18.3 MALIGNANT NEOPLASM OF HEPATIC FLEXURE (HCC): ICD-10-CM

## 2022-08-01 DIAGNOSIS — F32.9 REACTIVE DEPRESSION: ICD-10-CM

## 2022-08-01 DIAGNOSIS — M79.601 RIGHT ARM PAIN: ICD-10-CM

## 2022-08-01 DIAGNOSIS — Z86.74 HISTORY OF CARDIAC ARREST: ICD-10-CM

## 2022-08-01 DIAGNOSIS — R63.0 POOR APPETITE: ICD-10-CM

## 2022-08-01 DIAGNOSIS — C85.90 LYMPHOMA IN REMISSION (HCC): ICD-10-CM

## 2022-08-01 DIAGNOSIS — R10.84 ABDOMINAL PAIN, GENERALIZED: Primary | ICD-10-CM

## 2022-08-01 DIAGNOSIS — R53.83 OTHER FATIGUE: ICD-10-CM

## 2022-08-01 DIAGNOSIS — F41.9 ANXIETY: ICD-10-CM

## 2022-08-01 DIAGNOSIS — R11.0 NAUSEA WITHOUT VOMITING: ICD-10-CM

## 2022-08-01 PROCEDURE — 99214 OFFICE O/P EST MOD 30 MIN: CPT | Performed by: INTERNAL MEDICINE

## 2022-08-01 RX ORDER — ATROPINE SULFATE 0.4 MG/ML
0.4 INJECTION, SOLUTION ENDOTRACHEAL; INTRAMEDULLARY; INTRAMUSCULAR; INTRAVENOUS; SUBCUTANEOUS
Status: CANCELLED | OUTPATIENT
Start: 2022-08-08

## 2022-08-01 RX ORDER — ALBUTEROL SULFATE 0.83 MG/ML
2.5 SOLUTION RESPIRATORY (INHALATION) AS NEEDED
Status: CANCELLED
Start: 2022-08-08

## 2022-08-01 RX ORDER — ONDANSETRON 2 MG/ML
8 INJECTION INTRAMUSCULAR; INTRAVENOUS AS NEEDED
Status: CANCELLED | OUTPATIENT
Start: 2022-08-08

## 2022-08-01 RX ORDER — ACETAMINOPHEN 325 MG/1
650 TABLET ORAL AS NEEDED
Status: CANCELLED
Start: 2022-08-08

## 2022-08-01 RX ORDER — DIPHENHYDRAMINE HYDROCHLORIDE 50 MG/ML
50 INJECTION, SOLUTION INTRAMUSCULAR; INTRAVENOUS AS NEEDED
Status: CANCELLED
Start: 2022-08-08

## 2022-08-01 RX ORDER — DIPHENHYDRAMINE HYDROCHLORIDE 50 MG/ML
25 INJECTION, SOLUTION INTRAMUSCULAR; INTRAVENOUS AS NEEDED
Status: CANCELLED
Start: 2022-08-08

## 2022-08-01 RX ORDER — EPINEPHRINE 1 MG/ML
0.3 INJECTION, SOLUTION, CONCENTRATE INTRAVENOUS AS NEEDED
Status: CANCELLED | OUTPATIENT
Start: 2022-08-08

## 2022-08-01 RX ORDER — SODIUM CHLORIDE 0.9 % (FLUSH) 0.9 %
10 SYRINGE (ML) INJECTION AS NEEDED
Status: CANCELLED | OUTPATIENT
Start: 2022-08-10

## 2022-08-01 RX ORDER — HYDROCORTISONE SODIUM SUCCINATE 100 MG/2ML
100 INJECTION, POWDER, FOR SOLUTION INTRAMUSCULAR; INTRAVENOUS AS NEEDED
Status: CANCELLED | OUTPATIENT
Start: 2022-08-08

## 2022-08-01 RX ORDER — SODIUM CHLORIDE 9 MG/ML
10 INJECTION INTRAMUSCULAR; INTRAVENOUS; SUBCUTANEOUS AS NEEDED
Status: CANCELLED | OUTPATIENT
Start: 2022-08-10

## 2022-08-01 RX ORDER — HEPARIN 100 UNIT/ML
300-500 SYRINGE INTRAVENOUS AS NEEDED
Status: CANCELLED
Start: 2022-08-10

## 2022-08-01 RX ORDER — FENTANYL 12.5 UG/1
1 PATCH TRANSDERMAL
Qty: 10 PATCH | Refills: 0 | Status: SHIPPED | OUTPATIENT
Start: 2022-08-01 | End: 2022-08-22

## 2022-08-01 NOTE — PROGRESS NOTES
Palliative Medicine Office Visit  Palliative Medicine Nurse Check In  [phone]    Patient Name: Jeramy Bailey. YOB: 1975      Date of Office Visit: 8/1/2022    Patient states: \"  \"    From Check In Sheet (scanned in Media):  Has a medical provider talked with you about cardiopulmonary resuscitation (CPR)? [x] Yes   [] No   [] Unable to obtain    Nurse reminder to complete or update ACP FlowSheet:    Is ACP on the Problem List?    [] Yes    [x] No  IF ACP Document is ON FILE; Nurse to place ACP on Problem List     Is there an ACP Note in Chart Review/Note? [x] Yes    [] No   If NO: ALERT PROVIDER       Primary Decision Maker: Judy Lynch Girlfrienangelique  [phone]  Advance Care Planning 8/1/2022   Patient's Healthcare Decision Maker is: -   Confirm Advance Directive None   Patient Would Like to Complete Advance Directive -   Does the patient have other document types -        Is there anything that we should know about you as a person in order to provide you the best care possible? Have you been to the ER, urgent care clinic since your last visit? [] Yes   [x] No   [] Unable to obtain    Have you been hospitalized since your last visit? [] Yes   [x] No   [] Unable to obtain    Have you seen or consulted any other health care providers outside of the [address] [place] [address] since your last visit?    [] Yes   [x] No   [] Unable to obtain    Functional status (describe):         Last BM: 7/31/2022     accessed (date):       Patient did not bring In medication bottles to count    Bottle review (for opioid pain medication):  Medication 1:   Date filled:   Directions:   # filled:   # left:   # pills taking per day:  Last dose taken:    Medication 2:   Date filled:   Directions:   # filled:   # left:   # pills taking per day:  Last dose taken:    Medication 3:   Date filled:   Directions:   # filled:   # left:   # pills taking per day:  Last dose taken:     Medication 4:   Date filled:   Directions:   # filled:   # left:   # pills taking per day:  Last dose taken:

## 2022-08-01 NOTE — PATIENT INSTRUCTIONS
Dear Fredie Hatchet. ,    It was a pleasure seeing you today in [facility]. We will see you again in 5 to 6 weeks for an office visit to coordinate with your infusion. If labs or imaging tests have been ordered for you today, please call the office  at [phone] 48 hours after completion to obtain the results. Your described symptoms were: Fatigue: 5 Drowsiness: 4   Depression: 2 Pain: 6   Anxiety: 8 Nausea: 0 (Previous week - 5)   Anorexia: 5 Dyspnea: 1   Best Well-Being: 3 Constipation: No   Other Problem (Comment): 0       This is the plan we talked about:    1. Abdominal pain  -Continue oxycodone 10-mg every 4 hours as needed  -Start fentanyl patch 12-mcg every 72 hours  -You keep your opioids in a safe on a high shelf in your closet    2. Right arm pain  -The etiology (cause) of this pain is unclear at this point  -Dr. Demar Izquierdo has referred you for a port study  -Please follow-up with making an appointment with your cardiologist    3. Depression/anxiety  -Continue Lexapro 20-mg daily  -I'm avoiding benzodiazepines due to synergistic effect with opioids    4. Poor appetite/weight loss  -Continue eating your one good meal a day  -Try to eat smaller amounts of food throughout the day (4 to 5 times) to keep up with your nutritional needs  -You've been maintaining your weight in the 129-132 pound range     5. Fatigue  -This is chronic and unchanged   -You're sleeping about 5 hours at night    6. Nausea  -Continue ondansetron 8-mg every 8 hours as needed  -Continue prochlorperazine 10-mg every 6 hours as needed    7.  Colon mass  -You're receiving chemotherapy under the care of Dr. Demar Izquierdo  -Your recent scans showed response to your therapy    This is what you have shared with us about Advance Care Planning:      Primary Decision Maker: Tan Camara - Girlfriend - [phone]  Advance Care Planning 8/1/2022   Patient's Healthcare Decision Maker is: -   Confirm Advance Directive None   Patient Would Like to Complete Advance Directive -   Does the patient have other document types -           The Palliative Medicine Team is here to support you and your family.        Sincerely,      Sherif Granda MD and the Palliative Medicine Team

## 2022-08-01 NOTE — TELEPHONE ENCOUNTER
Called [employer] to verify insurance. Spoke with Yonis Quinteros. The id # has been updated to [id number] effective as of 7/1/22. Updated id #. Call ref # [id number].

## 2022-08-02 NOTE — TELEPHONE ENCOUNTER
Previous PA for Oxycodone ER indicated that patient was required to try & fail fentanyl patch or morphine ER. Unsure why script is requiring a PA.  However Prior Auth initiated as requested via cover my meds, awaiting insurer response

## 2022-08-03 ENCOUNTER — APPOINTMENT (OUTPATIENT)
Dept: INFUSION THERAPY | Age: 47
End: 2022-08-03
Payer: MEDICAID

## 2022-08-03 NOTE — PROGRESS NOTES
[id number] [facility]  [phone]    Hematology / Oncology Established Visit    Reason for Visit:   Alex Hernandez is a 42 y.o. male who is seen for urgent follow up of colon cancer, h/o lymphoma. Hematology Oncology Treatment History:     Diagnosis #1: Follicular lymphoma  Stage: IV  Pathology:   11/13/18 right inguinal LN excision: Follicular lymphoma, high-grade (grade 3a of 3). Prior Treatment:   1. Obinutuzumab-CHOP. Obinutuzumab: 1000 mg weekly on days 1, 8, 15 for cycle 1, then 1000 mg on day 1 q21 days for cycles 2-6, then monotherapy 1000 mg every 21 days for cycle 7, 8 with Cyclophosphamide 750mg/m2, Doxorubicin 50mg/m2, Vincristine 1.4mg/m2 on day 1 and Prednisone 100mg on Days 1-5, every 21 days for a total of 2 cycles completed late 1/2019. Regimen discontinued due to STEMI. 2. Obinutuzumab + Bendamustine: 1000 mg Obinutuzumab on day 1 + Bendamustine 90mg/m2 on days 1-2 on a 28-day cycle x 4 cycles, completed 5/2019  Current Treatment: Surveillance      Diagnosis #2: Colon cancer:  Stage: IV  Pathology:  Ascending colon; biopsy: poorly differentiated carcinoma  Comment: No dysplasia is identified. Immunohistochemical stains performed on block 1A, show the tumor positive for Cam5.2 and shows patchy positivity for CDX2 with a Ki-67 index of -90%; the tumor is focally positive for CK5/6 and GATA3; negative for p63, Pax8, CK7, CK20, panmelanoma, synaptophysin and chromogranin. The immunophenotypic features are nonspecific but argues somewhat against the following carcinoma: urothelial, neuroendocrine and breast. The immunophenotypic features also argues against melanoma and somewhat against classic colorectal carcinoma. Additional immunohistochemical stains to exclude the possibility of prostate and hepatocellular carcinoma have been   ordered; the results will be reported as an addendum.   MLH1/MSH2/MSH6/PMS2 all intact with no loss of nuclear expression of MMR proteins - no MSI   Addendum: The addendum is issued to report the results of additional immunohistochemical stains in an attempt to ascertain the primary site of the carcinoma. The diagnosis is unchanged. Hepar and glypican 3 immunohistochemical stains are neg, arguing against hepatocellular carcinoma. PSA stain is negative, arguing against prostatic primary. Imaging studies to eval for poss primary sites maybe useful. In the absence of carcinoma/tumor at any other sites, this may represent an undifferentiated carcinoma of the colon. Oncogenomics (molecular) studies: POLE< ARID1A, YVONNE, ATR, BRCA2, CHECK1, FANCI, NF1, PIK3CA, PTCH1, PTEN, RAD50, RAD51, RB1, SMARCA4, STK11      Prior Treatment:   1. Loop ileostomy 2/19/22  2. Diagnotic laparoscopy with peritoneal biopsy, 4/27/22    Current Treatment: FOLFOXIRI every 2 weeks until disease progression or toxicity, 5/16/22 - current      Oncologic History:  Was in his usual state of health in early November 2018 when he developed low back pain and presented to the ER. Work-up at outside hospital revealed a retroperitoneal mass seen on CT imaging, and he was transferred to [facility] for further work-up. CT there showed a large retroperitoneal mass encircling the aorta with invasion of the left renal hilum and left adrenal gland. There were bilateral inguinal lymph nodes and moderate left hydronephrosis. He was evaluated while at [facility] and was noted to have palpable nodes in his groin for approximately the past 1 month. He underwent excisional LN biopsy of right inguinal LN, which revealed follicular lymphoma. At time of diagnosis, he had no cardiac disease aside from HTN, hyperlipidemia. However, he did have an NSTEMI after cycle 2 of O-CHOP. Was likely unrelated to chemotherapy, but opted to switch treatment to Obinutuzumab-Bendamustine. He completed treatment in 5/2019 and had a CR based on PET.     History of Present Illness:   Mr. Segundo Pressley is a 42 y.o. male with prior h/o follicular lymphoma is seen for follow up of colon cancer and C6 of treatment. Reports good appetite. Mild taste change. Mild nausea, no vomiting. Using nausea medication as ordered, not requiring every day. No diarrhea. Reports good ostomy output. Drinking well, feels well hydrated. Having cold sensitivity but no neuropathy symptoms. Continues to work about 4 days per month. PMHx: Lymphoma in 2018, CAD, HTN, Hyperlipidemia, Anxiety, chronic low back pain  PSurgHx: None aside from cardiac stent placement and port placement  SHx: Smokes 1/2ppd x past 20 yrs. Works part time as a cook. Has 2 children. FHx: Father had leukemia, passed away from pancreatic cancer. Review of Systems: A complete review of systems was obtained, negative except as described above. Physical Exam:     Visit Vitals  BP (!) 141/94   Pulse 68   Temp 98 °F (36.7 °C)   Resp 16   Ht 5' 7\" (1.702 m)   Wt 131 lb (59.4 kg)   SpO2 98%   BMI 20.52 kg/m²         ECOG PS: 0  General: no distress  Eyes: anicteric sclerae  HENT: oropharynx clear  Neck: supple  Lymphatic: no cervical, supraclavicular adenopathy  Respiratory: normal respiratory effort  CV: no peripheral edema, port upper chest   GI: soft, nontender, nondistended, no masses;  colostomy in place. Skin: no rashes; no ecchymoses; no petechiae      Results:     Lab Results   Component Value Date/Time    WBC 3.0 (L) 08/08/2022 07:58 AM    HGB 10.9 (L) 08/08/2022 07:58 AM    HCT 33.8 (L) 08/08/2022 07:58 AM    PLATELET 94 (L) 05/06/9084 07:58 AM    MCV 92.3 08/08/2022 07:58 AM    ABS.  NEUTROPHILS 1.7 (L) 08/08/2022 07:58 AM    Hemoglobin (POC) 15.0 06/05/2009 02:13 PM    Hematocrit (POC) 39 02/14/2019 01:24 PM     Lab Results   Component Value Date/Time    Sodium 143 08/08/2022 07:58 AM    Potassium 3.5 08/08/2022 07:58 AM    Chloride 111 (H) 08/08/2022 07:58 AM    CO2 26 08/08/2022 07:58 AM    Glucose 96 08/08/2022 07:58 AM    BUN 6 08/08/2022 07:58 AM    Creatinine 0.87 2022 07:58 AM    GFR est AA >60 2022 07:58 AM    GFR est non-AA >60 2022 07:58 AM    Calcium 9.1 2022 07:58 AM    Sodium (POC) 136 2019 01:24 PM    Potassium (POC) 3.9 2019 01:24 PM    Chloride (POC) 102 2019 01:24 PM    Glucose (POC) 249 (H) 02/15/2019 10:21 PM    BUN (POC) 14 2019 01:24 PM    Creatinine (POC) 0.9 2019 01:24 PM    Calcium, ionized (POC) 1.24 2019 01:24 PM     Lab Results   Component Value Date/Time    Bilirubin, total 0.4 2022 07:58 AM    ALT (SGPT) 36 2022 07:58 AM    Alk. phosphatase 139 (H) 2022 07:58 AM    Protein, total 6.3 (L) 2022 07:58 AM    Albumin 2.9 (L) 2022 07:58 AM    Globulin 3.4 2022 07:58 AM     Lab Results   Component Value Date/Time    Iron 18 (L) 2022 11:13 AM    TIBC 173 (L) 2022 11:13 AM    Iron % saturation 10 (L) 2022 11:13 AM    Ferritin 426 (H) 2022 11:13 AM       No results found for: B12LT, FOL, RBCF  Lab Results   Component Value Date/Time    TSH 1.53 2016 04:40 AM       18:       Labs at [facility]:  22: CA 19-9 = 390  22: CEA = 12.3  22: CEA = 19.2  22: CEA = 17.9   22: CEA = 6.0    Signatera MRD:  22: 295.97 MTM/mL  22: 2.98    Imagin/9/18 Abd/pelvis CT: IMPRESSION:  1. Interval development of a large retroperitoneal mass encircling the aorta with invasion of the left renal hilum and left adrenal gland. Several adjacent lymph nodes are seen extending into the peritoneum and underneath the  diaphragmatic natalie. This most likely represents lymphoma. 2. Several new bilateral enlarged inguinal lymph nodes also likely representing lymphoma. 3. Moderate left hydronephrosis with a delayed renal nephrogram related to decreased renal function. This is related to the invasion of the renal hilum. 18 Chest CT: IMPRESSION:  Trace left pleural effusion.  Bilateral lower lobe atelectasis. Large  retroperitoneal mass lesion again demonstrated. PET 12/18/18:  FINDINGS:  HEAD/NECK: Right palatine tonsil intense hypermetabolism, max SUV 18. Multilevel  bilateral cervical adenopathy, with max SUV 12 in a left supraclavicular node. Cerebral evaluation is limited by normal intense activity. CHEST: Solitary hypermetabolic left axillary node, max SUV 11. ABDOMEN/PELVIS: Bulky retroperitoneal mass max SUV 27, with several additional  small active abdomino-pelvic nodes. Bilateral inguinal nodes with max SUV 12 on  the left. SKELETON: No foci of abnormal hypermetabolism in the axial and visualized  appendicular skeleton. IMPRESSION:   1. Right palatine tonsil tumor involvement (Deauville 5). 2. Bilateral cervical delaney involvement (Deauville 5). 3. Left axillary node involvement (Deauville 5). 4. Bulky retroperitoneal lymphoma mass and additional smaller hypermetabolic  abdomino-pelvic nodes (Deauville 5). 5. Bilateral inguinal delaney involvement (Deauville 5). Deauville Five Point Scale  1. No uptake or no residual uptake (when used interim)  2. Slight uptake, but below blood pool (mediastinum)  3. Uptake above mediastinal, but below/equal to uptake in the liver  4. Uptake slightly to moderately higher than liver  5. Markedly increased uptake or any new lesion (on response evaluation)  Each FDG-avid (or previously FDG avid) lesion is rated independently. Reference values:  Mediastinal blood pool: 2.1 SUV  Liver (background): 2.2 SUV    PET/CT 2/05/19:   IMPRESSION:  1. No Foci of Abnormal Hypermetabolism (Deauville 1). 2. Resolved activity in the right palatine tonsil, bilateral cervical nodes,left axillary node, retroperitoneal/abdominal pelvic adenopathy, bilateral inguinal nodes. Echo 2/14/19:  Normal cavity size, wall thickness and systolic function (ejection fraction normal). The muscle mass is normal. The cavity shape is normal. The estimated ejection fraction is 41 - 45%. Abnormal wall motion as described on the wall scoring diagram below. End-systolic volume is normal. Normal left ventricular strain. There is mild (grade 1) left ventricular diastolic dysfunction. Normal left ventricular diastolic pressure. End-diastolic volume is normal.    LE arterial duplex 2/22/19:  There is evidence of left groin pseudoaneurysm noted arising from distal common femoral artery, pseudo lobe measures 2.32cm x 2.58cm and pseudo neck length measuring 0.63cm. There is no evidence of hemodynamically significant left lower extremity arterial obstruction. JACLYN is 1.03 on the right and 1.02 on the left. LE arterial duplex s/p Thrombin Injection to Pseudoaneurysm 2/26/19:  Successful thrombin injection procedure of the left groin with no further flow seen. No evidence of hemodnyamically significant obstruction in the left lower extremity. Left lower extremity arterial duplex performed. Confirmed pseudoaneurysm in left groin with small neck. Following thrombin injection, no further flow seen in the pseudoaneurysm. The left common femoral, profunda femoral, femoral, popliteal, posterior tibial and anterior arteries were imaged. Mainly triphasic flow was seen with no evidence of significantly elevated velocities. Repeat LE arterial duplex 2/27/19:  Continued thrombosed left groin pseudoaneurysm following thrombin injection on 02/26/2019. No flow or color fill is identified. The hematoma measures approximately 2.1 x 2.9 cm in diameter. The common femoral, deep femoral, femoral, and popliteal arteries are patent with mainly tri-phasic flow and no significant hemodynamically obstruction is noted. Stress 5/31/19:  Normal stress myocardial perfusion without ischemia or infact at 84% MPHR. Normal LV function. LVEF 60%. No EKG changes of ischemia at peak exercise. Normal functional capacity. PET 6/03/19: IMPRESSION: No Foci of Abnormal Hypermetabolism (Deauville 1).     -MRI lumbar spine 12/18/19:  Mild disc degenerative change at L3-4 and L4-5. Mild canal stenosis at L3-4 and mild left foraminal stenosis at L4-5. Other less severe degenerative findings are as described above. Continued diminished size of retroperitoneal mass-adenopathy,  with diminished soft tissue density at the left renal hilum. -CT chest/abdomen 12/16/19:  Findings are consistent with interval response to therapy    CT c/a/p 5/28/20: IMPRESSION:  Further contraction of the retroperitoneal mantle and chris mesentery,  compatible with treatment response  Stable left hilar soft tissue mass  Slight increased splaying of the duodenum and proximal jejunum is the result, without obstruction  No evidence for recurrence of lymphoma in the chest, abdomen, or pelvis    CT c/a/p 2/13/22:  FINDINGS:   LOWER THORAX: Dependent atelectasis with otherwise clear lungs. The visualized  heart is normal in size without pericardial effusion. LIVER: No mass. BILIARY TREE: Gallbladder is unremarkable. CBD is not dilated. SPLEEN: Unremarkable. PANCREAS: No mass or ductal dilatation. ADRENALS: Unremarkable. KIDNEYS: Atrophic left kidney with mild left hydronephrosis. Right kidney is  unremarkable with no stone, enhancing mass, or other renal abnormality. STOMACH: Unremarkable. SMALL BOWEL: No dilatation or wall thickening. COLON: Circumferential wall thickening at the hepatic flexure with luminal  narrowing, adjacent mesenteric stranding, and fluid with upstream retention in  the cecum and fecalization of distal ileal contents. No dilation or other wall  thickening. APPENDIX: Unremarkable. PERITONEUM: Moderate free fluid with no pneumoperitoneum. RETROPERITONEUM: Soft tissue stranding around the celiac and SMA and associated aorta with no discrete mass or adenopathy. Aorta is normal in size without aneurysm or dissection. REPRODUCTIVE ORGANS: Prostate and seminal vesicles appear unremarkable.   URINARY BLADDER: No mass or calculus. BONES: No destructive bone lesion. ABDOMINAL WALL: No mass or hernia. ADDITIONAL COMMENTS: N/A  IMPRESSION  1. Annular mass lesion of the right colon with upstream fecal retention  concerning for primary colon neoplasm versus less likely lymphoma. 2. Soft tissue stranding around celiac axis, SMA, and abdominal aorta may  reflect infiltrative lymphoma. 3. Left renal atrophy with left hydronephrosis likely reflecting chronic  proximal ureteral obstruction. 4. Incidentals as above. 2/24/22 CT abd/pelvis at [facility]:  FINDINGS: An enteric tube with sidehole beyond the level of the lower esophageal sphincter is seen looping on itself in the proximal stomach before terminating in the mid stomach. Status post right lower quadrant diverting ileostomy for an obstructing colonic mass. Multiple loops of gas dilated small bowel remain, with a maximal diameter of 5.4 cm whereas previously measured 3.6 cm. Dilute contrast is seen within the lateral left abdominal dilated small bowel. Moderate stool burden and bowel gas opacifies the cecum, measuring 7.3 cm in diameter. No definite supine evidence of pneumoperitoneum. Lung bases: Limited evaluation of the lung bases demonstrates a cardiac silhouette within normal limits for size. A small bore central venous catheter is seen terminating in the right atrium. No focal consolidation or pleural effusion. 2/24/22 CT abd/pelvis [facility]:  IMPRESSION:  1. Status post right lower quadrant loop ileostomy. Mild diffuse dilation of small bowel proximal to the ostomy in the lower abdomen and upper pelvis concerning for at least mild to moderate partial bowel obstruction. Relatively decompressed loop ileostomy. 2. Mildly complex pelvic fluid collection in the rectovesical space, decreased in size from prior. 3. Redemonstrated ascending colon mass and findings suspicious for regional metastatic disease.   4. Redemonstrated soft tissue in the retroperitoneum with prominent lymph nodes, similar to prior examination. Differential would include metastasis, retroperitoneal fibrosis. 5. Mild atherosclerosis. 6. Subcentimeter right renal hypodensity, too small to characterize. 7. Severe compression of the left renal vein, similar to prior examination. 8. Additional findings as described above. Bones: No acute osseous abnormality. 2/24/22 CT chest [facility]:  IMPRESSION:  FINAL report. 1. No evidence of metastatic disease to the chest.  2. No enlarged lymph nodes. 3. Increasing volume loss in the lung bases which may be due to atelectasis. 4. CT abdomen and pelvis reported separately. 2/25/22 Colonoscopy at [facility]:  Impression:            - Preparation of the colon was inadequate. - Stool in the entire examined colon. - Likely malignant completely obstructing tumor at the                         hepatic flexure. Biopsied.                        - Malignant-appearing tumor in the colon. Complications:         No immediate complications. 7/19/22 CT ch/abd/pelv:  IMPRESSION  Response to treatment characterized by decrease in size of the apical core  lesion in the proximal transverse colon and decreased mucosal colic  lymphadenopathy. Persistent mesenteric lymphadenopathy and retroperitoneal/mesenteric soft tissue  which may relate to the patient's history of lymphoma. Chronic left renal atrophy with chronic left hydronephrosis. RECIST:  Target lesions:  Transverse colon mass, series 2, image 75, 27 x 26 mm, previously 49 x 45 mm. Nontarget lesions:  Transverse mesocolic lymph nodes, decreased. Assessment & Plan:   Alex Hernandez is a 42 y.o. male comes in for evaluation and management of lymphoma. 1. Undifferentiated carcinoma of transverse colon, metastatic, KRAS/NRAS status unclear:  S/p diverting ileostomy due to large bowel obstruction. Colonoscopy with biopsy on 2/25/22.  No obvious metastatic disease on CT imaging, but did have obvious metastatic disease to peritoneum during laparoscopy with Dr. Bree Christian. I recommended FOLFOXIRI every 2 weeks. Will not give Bevacizumab given h/o MRI and tobacco use. Lyubov suggests: BRCA2 and YVONNE mutations support use of Olaparib or similar PARP inhibitor in future. They also suggest testing for TMB, MSI, PDL1 to determine utility of checkpoint inhibitors. CT after 4 cycles of treatment shows response to treatment with decrease size in transverse colon mass on 7/19/22. Supportive medications: zofran, compazine, dexamethasone, EMLA topical  -- Proceed with C6 of FOLFOXIRI with neulasta support. Added neulasta with pump removal.   -- Repeat Sign[facility] testing as scheduled, next due 8/29/22. Looking into adding [employer] or alternate NGS testing to determine TMB, MSI, PDL1, KRAS/NRAS/BRAF. -- Check CEA every 8 weeks  -- Consider repeat colonoscopy in 4-6 months given incomplete colonoscopy. -- Repeat CT of ch/abd/pelvis after C8.   -- Follow up in 2 weeks C7 FOLFOXIRI, MD/NP visit. 2. H/o Follicular lymphoma:   Grade 3a with with bulky disease encircling the aorta, causing pain. Bone marrow negative for lymphoma, but was hypercellular. BR better than RCHOP, but based on GALLIUM study, Obinutuzumab-based induction and maintenance prolongs PFS over that seen with rituximab-based therapy. Therefore, pt started on O-CHOP regimen. He received 2 cycles with CR and was then switched to BR x 4 cycles given STEMI. Completed treatment 5/2019. PET completed 6/3/19 showed CR. CT 5/28/20 negative for disease. No extra surveillance needed at this time given metastatic colon cancer with frequent imaging. 3. Chronic lumbar back pain / anxiety / RLQ / insomnia:   Left lower back pain is chronic/stable and RLQ is likely related to neoplasm/colostomy - currently managing pain on Oxycontin 10mg q12h, Oxycodone q4h and Lexapro daily. Signed pain contract on 12/28/18 and following with Dr. Alphonso Wong. Trazodone, melatonin not helping insomnia. Reviewed sleep hygiene. 4. CAD / HTN:   h/o STEMI 2/14/29 with TOM placed to proximal LAD. Following with Dr. Forbes Has and remains on dual antiplatelet therapy, high dose Lipitor, Metoprolol, ACEI. Received only 2 doses of cardiotoxic chemo, Doxorubicin in early 1/2019. Is overdue for follow up with Dr. Forbes Has. 5. Tobacco abuse:   Discussed benefits of quitting smoking again. Previously discussed replacing hand-to-mouth habit with another item such as Twizzlers or SlimJims. 6. BRCA2 mutation:  Will discuss with patient in future. He would benefit from genetic counseling for himself and his family. 7. Weight-loss/ fatigue:   Improving. Encouraged supplementing with 2 boost/ensures daily in addition to meals and 60 oz water daily and advised scheduled antiemetics on days 4-6.     8. H/o chemotherapy induced neutropenia:  Neulasta added with C3 forward. Advised neutropenic precautions. Held with C5 due to insurance authorization. Added back with C6 and reached out to auth team.    Goals of care: Disease is not curable, but is treatable to improve quality and duration of life. I personally saw and evaluated the patient and performed the key components of medical decision making. The history, physical exam, and documentation were performed by Radha Fernandez NP. I reviewed and verified the above documentation and modified it as needed. Proceed with C6 of FOLFOXIRI today which he is tolerating well. Have added Neulasta for D3. CEA declined to 6.0.      Signed By: Ava Milan MD     August 8, 2022

## 2022-08-08 ENCOUNTER — OFFICE VISIT (OUTPATIENT)
Dept: ONCOLOGY | Age: 47
End: 2022-08-08
Payer: MEDICAID

## 2022-08-08 ENCOUNTER — HOSPITAL ENCOUNTER (OUTPATIENT)
Dept: INFUSION THERAPY | Age: 47
Discharge: HOME | End: 2022-08-08
Payer: MEDICAID

## 2022-08-08 VITALS
HEIGHT: 67 IN | RESPIRATION RATE: 16 BRPM | WEIGHT: 131 LBS | SYSTOLIC BLOOD PRESSURE: 141 MMHG | DIASTOLIC BLOOD PRESSURE: 94 MMHG | HEART RATE: 68 BPM | TEMPERATURE: 98 F | OXYGEN SATURATION: 98 % | BODY MASS INDEX: 20.56 KG/M2

## 2022-08-08 VITALS
SYSTOLIC BLOOD PRESSURE: 176 MMHG | HEIGHT: 67 IN | BODY MASS INDEX: 20.69 KG/M2 | WEIGHT: 131.8 LBS | HEART RATE: 63 BPM | OXYGEN SATURATION: 98 % | RESPIRATION RATE: 16 BRPM | TEMPERATURE: 98.5 F | DIASTOLIC BLOOD PRESSURE: 84 MMHG

## 2022-08-08 DIAGNOSIS — T45.1X5A CHEMOTHERAPY INDUCED NEUTROPENIA (HCC): ICD-10-CM

## 2022-08-08 DIAGNOSIS — C18.9 COLON ADENOCARCINOMA (HCC): ICD-10-CM

## 2022-08-08 DIAGNOSIS — C18.4 CARCINOMA OF TRANSVERSE COLON (HCC): Primary | ICD-10-CM

## 2022-08-08 DIAGNOSIS — C18.9 COLON ADENOCARCINOMA (HCC): Primary | ICD-10-CM

## 2022-08-08 DIAGNOSIS — D70.1 CHEMOTHERAPY INDUCED NEUTROPENIA (HCC): ICD-10-CM

## 2022-08-08 DIAGNOSIS — Z76.89 PREVENTION OF CHEMOTHERAPY-INDUCED NEUTROPENIA: ICD-10-CM

## 2022-08-08 DIAGNOSIS — C82.90 FOLLICULAR LYMPHOMA, UNSPECIFIED FOLLICULAR LYMPHOMA TYPE, UNSPECIFIED BODY REGION (HCC): ICD-10-CM

## 2022-08-08 DIAGNOSIS — R53.0 NEOPLASTIC MALIGNANT RELATED FATIGUE: ICD-10-CM

## 2022-08-08 DIAGNOSIS — Z51.11 CHEMOTHERAPY MANAGEMENT, ENCOUNTER FOR: ICD-10-CM

## 2022-08-08 DIAGNOSIS — R10.31 RLQ ABDOMINAL PAIN: ICD-10-CM

## 2022-08-08 LAB
ALBUMIN SERPL-MCNC: 2.9 G/DL (ref 3.5–5)
ALBUMIN/GLOB SERPL: 0.9 {RATIO} (ref 1.1–2.2)
ALP SERPL-CCNC: 139 U/L (ref 45–117)
ALT SERPL-CCNC: 36 U/L (ref 12–78)
ANION GAP SERPL CALC-SCNC: 6 MMOL/L (ref 5–15)
AST SERPL-CCNC: 33 U/L (ref 15–37)
BASOPHILS # BLD: 0 K/UL (ref 0–0.1)
BASOPHILS NFR BLD: 1 % (ref 0–1)
BILIRUB SERPL-MCNC: 0.4 MG/DL (ref 0.2–1)
BUN SERPL-MCNC: 6 MG/DL (ref 6–20)
BUN/CREAT SERPL: 7 (ref 12–20)
CALCIUM SERPL-MCNC: 9.1 MG/DL (ref 8.5–10.1)
CHLORIDE SERPL-SCNC: 111 MMOL/L (ref 97–108)
CO2 SERPL-SCNC: 26 MMOL/L (ref 21–32)
CREAT SERPL-MCNC: 0.87 MG/DL (ref 0.7–1.3)
DIFFERENTIAL METHOD BLD: ABNORMAL
EOSINOPHIL # BLD: 0.1 K/UL (ref 0–0.4)
EOSINOPHIL NFR BLD: 3 % (ref 0–7)
ERYTHROCYTE [DISTWIDTH] IN BLOOD BY AUTOMATED COUNT: 24.4 % (ref 11.5–14.5)
GLOBULIN SER CALC-MCNC: 3.4 G/DL (ref 2–4)
GLUCOSE SERPL-MCNC: 96 MG/DL (ref 65–100)
HCT VFR BLD AUTO: 33.8 % (ref 36.6–50.3)
HGB BLD-MCNC: 10.9 G/DL (ref 12.1–17)
IMM GRANULOCYTES # BLD AUTO: 0 K/UL
IMM GRANULOCYTES NFR BLD AUTO: 0 %
LYMPHOCYTES # BLD: 0.9 K/UL (ref 0.8–3.5)
LYMPHOCYTES NFR BLD: 31 % (ref 12–49)
MCH RBC QN AUTO: 29.8 PG (ref 26–34)
MCHC RBC AUTO-ENTMCNC: 32.2 G/DL (ref 30–36.5)
MCV RBC AUTO: 92.3 FL (ref 80–99)
MONOCYTES # BLD: 0.3 K/UL (ref 0–1)
MONOCYTES NFR BLD: 11 % (ref 5–13)
NEUTS SEG # BLD: 1.7 K/UL (ref 1.8–8)
NEUTS SEG NFR BLD: 54 % (ref 32–75)
NRBC # BLD: 0 K/UL (ref 0–0.01)
NRBC BLD-RTO: 0 PER 100 WBC
PLATELET # BLD AUTO: 94 K/UL (ref 150–400)
PMV BLD AUTO: 10.4 FL (ref 8.9–12.9)
POTASSIUM SERPL-SCNC: 3.5 MMOL/L (ref 3.5–5.1)
PROT SERPL-MCNC: 6.3 G/DL (ref 6.4–8.2)
RBC # BLD AUTO: 3.66 M/UL (ref 4.1–5.7)
RBC MORPH BLD: ABNORMAL
SODIUM SERPL-SCNC: 143 MMOL/L (ref 136–145)
WBC # BLD AUTO: 3 K/UL (ref 4.1–11.1)

## 2022-08-08 PROCEDURE — 96367 TX/PROPH/DG ADDL SEQ IV INF: CPT

## 2022-08-08 PROCEDURE — 96416 CHEMO PROLONG INFUSE W/PUMP: CPT

## 2022-08-08 PROCEDURE — 85025 COMPLETE CBC W/AUTO DIFF WBC: CPT

## 2022-08-08 PROCEDURE — 96413 CHEMO IV INFUSION 1 HR: CPT

## 2022-08-08 PROCEDURE — 36415 COLL VENOUS BLD VENIPUNCTURE: CPT

## 2022-08-08 PROCEDURE — 99215 OFFICE O/P EST HI 40 MIN: CPT | Performed by: INTERNAL MEDICINE

## 2022-08-08 PROCEDURE — 74011000258 HC RX REV CODE- 258: Performed by: INTERNAL MEDICINE

## 2022-08-08 PROCEDURE — 96368 THER/DIAG CONCURRENT INF: CPT

## 2022-08-08 PROCEDURE — 96415 CHEMO IV INFUSION ADDL HR: CPT

## 2022-08-08 PROCEDURE — 96417 CHEMO IV INFUS EACH ADDL SEQ: CPT

## 2022-08-08 PROCEDURE — 74011250636 HC RX REV CODE- 250/636: Performed by: INTERNAL MEDICINE

## 2022-08-08 PROCEDURE — 80053 COMPREHEN METABOLIC PANEL: CPT

## 2022-08-08 PROCEDURE — 96375 TX/PRO/DX INJ NEW DRUG ADDON: CPT

## 2022-08-08 RX ORDER — SODIUM CHLORIDE 9 MG/ML
10 INJECTION INTRAMUSCULAR; INTRAVENOUS; SUBCUTANEOUS AS NEEDED
Status: CANCELLED | OUTPATIENT
Start: 2022-08-22

## 2022-08-08 RX ORDER — DIPHENHYDRAMINE HYDROCHLORIDE 50 MG/ML
50 INJECTION, SOLUTION INTRAMUSCULAR; INTRAVENOUS AS NEEDED
Status: CANCELLED
Start: 2022-08-22

## 2022-08-08 RX ORDER — HYDROCORTISONE SODIUM SUCCINATE 100 MG/2ML
100 INJECTION, POWDER, FOR SOLUTION INTRAMUSCULAR; INTRAVENOUS AS NEEDED
Status: CANCELLED | OUTPATIENT
Start: 2022-09-12

## 2022-08-08 RX ORDER — HEPARIN 100 UNIT/ML
300-500 SYRINGE INTRAVENOUS AS NEEDED
Status: CANCELLED
Start: 2022-09-14

## 2022-08-08 RX ORDER — SODIUM CHLORIDE 0.9 % (FLUSH) 0.9 %
10 SYRINGE (ML) INJECTION AS NEEDED
Status: CANCELLED | OUTPATIENT
Start: 2022-08-31

## 2022-08-08 RX ORDER — DEXTROSE MONOHYDRATE 50 MG/ML
25 INJECTION, SOLUTION INTRAVENOUS CONTINUOUS
Status: DISPENSED | OUTPATIENT
Start: 2022-08-08 | End: 2022-08-08

## 2022-08-08 RX ORDER — SODIUM CHLORIDE 9 MG/ML
10 INJECTION INTRAMUSCULAR; INTRAVENOUS; SUBCUTANEOUS AS NEEDED
Status: ACTIVE | OUTPATIENT
Start: 2022-08-08 | End: 2022-08-08

## 2022-08-08 RX ORDER — HYDROCORTISONE SODIUM SUCCINATE 100 MG/2ML
100 INJECTION, POWDER, FOR SOLUTION INTRAMUSCULAR; INTRAVENOUS AS NEEDED
Status: CANCELLED | OUTPATIENT
Start: 2022-08-22

## 2022-08-08 RX ORDER — EPINEPHRINE 1 MG/ML
0.3 INJECTION, SOLUTION, CONCENTRATE INTRAVENOUS AS NEEDED
Status: CANCELLED | OUTPATIENT
Start: 2022-09-12

## 2022-08-08 RX ORDER — SODIUM CHLORIDE 9 MG/ML
10 INJECTION INTRAMUSCULAR; INTRAVENOUS; SUBCUTANEOUS AS NEEDED
Status: CANCELLED | OUTPATIENT
Start: 2022-09-12

## 2022-08-08 RX ORDER — DIPHENHYDRAMINE HYDROCHLORIDE 50 MG/ML
25 INJECTION, SOLUTION INTRAMUSCULAR; INTRAVENOUS AS NEEDED
Status: CANCELLED
Start: 2022-08-22

## 2022-08-08 RX ORDER — SODIUM CHLORIDE 0.9 % (FLUSH) 0.9 %
10 SYRINGE (ML) INJECTION AS NEEDED
Status: DISPENSED | OUTPATIENT
Start: 2022-08-08 | End: 2022-08-08

## 2022-08-08 RX ORDER — ATROPINE SULFATE 0.4 MG/ML
0.4 INJECTION, SOLUTION ENDOTRACHEAL; INTRAMEDULLARY; INTRAMUSCULAR; INTRAVENOUS; SUBCUTANEOUS
Status: CANCELLED | OUTPATIENT
Start: 2022-08-22

## 2022-08-08 RX ORDER — HEPARIN 100 UNIT/ML
300-500 SYRINGE INTRAVENOUS AS NEEDED
Status: CANCELLED
Start: 2022-08-31

## 2022-08-08 RX ORDER — SODIUM CHLORIDE 0.9 % (FLUSH) 0.9 %
10 SYRINGE (ML) INJECTION AS NEEDED
Status: CANCELLED | OUTPATIENT
Start: 2022-09-12

## 2022-08-08 RX ORDER — HEPARIN 100 UNIT/ML
300-500 SYRINGE INTRAVENOUS AS NEEDED
Status: ACTIVE | OUTPATIENT
Start: 2022-08-08 | End: 2022-08-08

## 2022-08-08 RX ORDER — ATROPINE SULFATE 0.4 MG/ML
0.4 INJECTION, SOLUTION ENDOTRACHEAL; INTRAMEDULLARY; INTRAMUSCULAR; INTRAVENOUS; SUBCUTANEOUS
Status: CANCELLED | OUTPATIENT
Start: 2022-09-12

## 2022-08-08 RX ORDER — DEXTROSE MONOHYDRATE 50 MG/ML
25 INJECTION, SOLUTION INTRAVENOUS CONTINUOUS
Status: CANCELLED
Start: 2022-09-12

## 2022-08-08 RX ORDER — PALONOSETRON 0.05 MG/ML
0.25 INJECTION, SOLUTION INTRAVENOUS ONCE
Status: CANCELLED | OUTPATIENT
Start: 2022-08-22 | End: 2022-08-22

## 2022-08-08 RX ORDER — EPINEPHRINE 1 MG/ML
0.3 INJECTION, SOLUTION, CONCENTRATE INTRAVENOUS AS NEEDED
Status: CANCELLED | OUTPATIENT
Start: 2022-08-22

## 2022-08-08 RX ORDER — HEPARIN 100 UNIT/ML
300-500 SYRINGE INTRAVENOUS AS NEEDED
Status: CANCELLED
Start: 2022-09-12

## 2022-08-08 RX ORDER — DIPHENHYDRAMINE HYDROCHLORIDE 50 MG/ML
50 INJECTION, SOLUTION INTRAMUSCULAR; INTRAVENOUS AS NEEDED
Status: CANCELLED
Start: 2022-09-12

## 2022-08-08 RX ORDER — SODIUM CHLORIDE 9 MG/ML
25 INJECTION, SOLUTION INTRAVENOUS CONTINUOUS
Status: DISCONTINUED | OUTPATIENT
Start: 2022-08-08 | End: 2022-08-10 | Stop reason: HOSPADM

## 2022-08-08 RX ORDER — SODIUM CHLORIDE 9 MG/ML
10 INJECTION INTRAMUSCULAR; INTRAVENOUS; SUBCUTANEOUS AS NEEDED
Status: CANCELLED | OUTPATIENT
Start: 2022-08-31

## 2022-08-08 RX ORDER — HEPARIN 100 UNIT/ML
300-500 SYRINGE INTRAVENOUS AS NEEDED
Status: CANCELLED
Start: 2022-08-22

## 2022-08-08 RX ORDER — DEXTROSE MONOHYDRATE 50 MG/ML
25 INJECTION, SOLUTION INTRAVENOUS CONTINUOUS
Status: CANCELLED
Start: 2022-08-22

## 2022-08-08 RX ORDER — ALBUTEROL SULFATE 0.83 MG/ML
2.5 SOLUTION RESPIRATORY (INHALATION) AS NEEDED
Status: CANCELLED
Start: 2022-09-12

## 2022-08-08 RX ORDER — SODIUM CHLORIDE 0.9 % (FLUSH) 0.9 %
10 SYRINGE (ML) INJECTION AS NEEDED
Status: CANCELLED | OUTPATIENT
Start: 2022-08-22

## 2022-08-08 RX ORDER — ONDANSETRON 2 MG/ML
8 INJECTION INTRAMUSCULAR; INTRAVENOUS AS NEEDED
Status: CANCELLED | OUTPATIENT
Start: 2022-08-22

## 2022-08-08 RX ORDER — PALONOSETRON 0.05 MG/ML
0.25 INJECTION, SOLUTION INTRAVENOUS ONCE
Status: CANCELLED | OUTPATIENT
Start: 2022-09-12 | End: 2022-09-05

## 2022-08-08 RX ORDER — PALONOSETRON 0.05 MG/ML
0.25 INJECTION, SOLUTION INTRAVENOUS ONCE
Status: COMPLETED | OUTPATIENT
Start: 2022-08-08 | End: 2022-08-08

## 2022-08-08 RX ORDER — ONDANSETRON 2 MG/ML
8 INJECTION INTRAMUSCULAR; INTRAVENOUS AS NEEDED
Status: CANCELLED | OUTPATIENT
Start: 2022-09-12

## 2022-08-08 RX ORDER — SODIUM CHLORIDE 9 MG/ML
10 INJECTION INTRAMUSCULAR; INTRAVENOUS; SUBCUTANEOUS AS NEEDED
Status: CANCELLED | OUTPATIENT
Start: 2022-09-14

## 2022-08-08 RX ORDER — SODIUM CHLORIDE 9 MG/ML
25 INJECTION, SOLUTION INTRAVENOUS CONTINUOUS
Status: CANCELLED
Start: 2022-08-22

## 2022-08-08 RX ORDER — ACETAMINOPHEN 325 MG/1
650 TABLET ORAL AS NEEDED
Status: CANCELLED
Start: 2022-08-22

## 2022-08-08 RX ORDER — DIPHENHYDRAMINE HYDROCHLORIDE 50 MG/ML
25 INJECTION, SOLUTION INTRAMUSCULAR; INTRAVENOUS AS NEEDED
Status: CANCELLED
Start: 2022-09-12

## 2022-08-08 RX ORDER — ACETAMINOPHEN 325 MG/1
650 TABLET ORAL AS NEEDED
Status: CANCELLED
Start: 2022-09-12

## 2022-08-08 RX ORDER — SODIUM CHLORIDE 9 MG/ML
25 INJECTION, SOLUTION INTRAVENOUS CONTINUOUS
Status: CANCELLED
Start: 2022-09-12

## 2022-08-08 RX ORDER — SODIUM CHLORIDE 0.9 % (FLUSH) 0.9 %
10 SYRINGE (ML) INJECTION AS NEEDED
Status: CANCELLED | OUTPATIENT
Start: 2022-09-14

## 2022-08-08 RX ORDER — ALBUTEROL SULFATE 0.83 MG/ML
2.5 SOLUTION RESPIRATORY (INHALATION) AS NEEDED
Status: CANCELLED
Start: 2022-08-22

## 2022-08-08 RX ADMIN — OXALIPLATIN 144.5 MG: 5 INJECTION, SOLUTION INTRAVENOUS at 12:38

## 2022-08-08 RX ADMIN — LEUCOVORIN CALCIUM 680 MG: 200 INJECTION, POWDER, LYOPHILIZED, FOR SUSPENSION INTRAMUSCULAR; INTRAVENOUS at 12:38

## 2022-08-08 RX ADMIN — DEXTROSE MONOHYDRATE 281 MG: 5 INJECTION, SOLUTION INTRAVENOUS at 11:00

## 2022-08-08 RX ADMIN — PALONOSETRON 0.25 MG: 0.05 INJECTION, SOLUTION INTRAVENOUS at 09:57

## 2022-08-08 RX ADMIN — DEXAMETHASONE SODIUM PHOSPHATE 12 MG: 4 INJECTION, SOLUTION INTRAMUSCULAR; INTRAVENOUS at 09:48

## 2022-08-08 RX ADMIN — SODIUM CHLORIDE 25 ML/HR: 9 INJECTION, SOLUTION INTRAVENOUS at 09:49

## 2022-08-08 RX ADMIN — DEXTROSE MONOHYDRATE 25 ML/HR: 50 INJECTION, SOLUTION INTRAVENOUS at 10:46

## 2022-08-08 RX ADMIN — FLUOROURACIL 4080 MG: 50 INJECTION, SOLUTION INTRAVENOUS at 14:52

## 2022-08-08 NOTE — PROGRESS NOTES
[facility] Progress Note    Date: 2022    Name: Emely Pederson. MRN: [id number]         : 1975    Mr. Dylon Monroy Arrived ambulatory and in no distress for C6D1 of Regimen. Assessment was completed, no acute issues at this time, no new complaints voiced. Chest wall port accessed without difficulty, labs drawn & sent for processing, by John Barba RN. Chemotherapy Flowsheet 2022   Cycle C6D1   Date 2022   Drug / Regimen Folfoxiri   Post Hydration -   Pre Meds given   Notes given        Patient proceed to appointment with Dr. Jarocho Humphrey. Mr. Yue Martines vitals were reviewed. Visit Vitals  BP (!) 141/94   Pulse 68   Temp 98.5 °F (36.9 °C)   Resp 16   Ht 5' 7\" (1.702 m)   Wt 59.8 kg (131 lb 12.8 oz)   SpO2 98%   BMI 20.64 kg/m²       Lab results were obtained and reviewed. Recent Results (from the past 12 hour(s))   CBC WITH AUTOMATED DIFF    Collection Time: 22  7:58 AM   Result Value Ref Range    WBC 3.0 (L) 4.1 - 11.1 K/uL    RBC 3.66 (L) 4.10 - 5.70 M/uL    HGB 10.9 (L) 12.1 - 17.0 g/dL    HCT 33.8 (L) 36.6 - 50.3 %    MCV 92.3 80.0 - 99.0 FL    MCH 29.8 26.0 - 34.0 PG    MCHC 32.2 30.0 - 36.5 g/dL    RDW 24.4 (H) 11.5 - 14.5 %    PLATELET 94 (L) 219 - 400 K/uL    MPV 10.4 8.9 - 12.9 FL    NRBC 0.0 0  WBC    ABSOLUTE NRBC 0.00 0.00 - 0.01 K/uL    NEUTROPHILS 54 32 - 75 %    LYMPHOCYTES 31 12 - 49 %    MONOCYTES 11 5 - 13 %    EOSINOPHILS 3 0 - 7 %    BASOPHILS 1 0 - 1 %    IMMATURE GRANULOCYTES 0 %    ABS. NEUTROPHILS 1.7 (L) 1.8 - 8.0 K/UL    ABS. LYMPHOCYTES 0.9 0.8 - 3.5 K/UL    ABS. MONOCYTES 0.3 0.0 - 1.0 K/UL    ABS. EOSINOPHILS 0.1 0.0 - 0.4 K/UL    ABS. BASOPHILS 0.0 0.0 - 0.1 K/UL    ABS. IMM.  GRANS. 0.0 K/UL    DF MANUAL      RBC COMMENTS ANISOCYTOSIS  3+       METABOLIC PANEL, COMPREHENSIVE    Collection Time: 22  7:58 AM   Result Value Ref Range    Sodium 143 136 - 145 mmol/L    Potassium 3.5 3.5 - 5.1 mmol/L    Chloride 111 (H) 97 - 108 mmol/L    CO2 26 21 - 32 mmol/L    Anion gap 6 5 - 15 mmol/L    Glucose 96 65 - 100 mg/dL    BUN 6 6 - 20 MG/DL    Creatinine 0.87 0.70 - 1.30 MG/DL    BUN/Creatinine ratio 7 (L) 12 - 20      GFR est AA >60 >60 ml/min/1.73m2    GFR est non-AA >60 >60 ml/min/1.73m2    Calcium 9.1 8.5 - 10.1 MG/DL    Bilirubin, total 0.4 0.2 - 1.0 MG/DL    ALT (SGPT) 36 12 - 78 U/L    AST (SGOT) 33 15 - 37 U/L    Alk.  phosphatase 139 (H) 45 - 117 U/L    Protein, total 6.3 (L) 6.4 - 8.2 g/dL    Albumin 2.9 (L) 3.5 - 5.0 g/dL    Globulin 3.4 2.0 - 4.0 g/dL    A-G Ratio 0.9 (L) 1.1 - 2.2         Medications:        Medications Administered       0.9% sodium chloride infusion       Admin Date  08/08/2022 Action  New Bag Dose  25 mL/hr Rate  25 mL/hr Route  IntraVENous Administered By  Mynor South RN              dexamethasone (DECADRON) 12 mg in 0.9% sodium chloride 50 mL IVPB       Admin Date  08/08/2022 Action  New Bag Dose  12 mg Route  IntraVENous Administered By  Mynor South RN              dextrose 5% infusion       Admin Date  08/08/2022 Action  New Bag Dose  25 mL/hr Rate  25 mL/hr Route  IntraVENous Administered By  Mynor South RN              fluorouraciL (ADRUCIL) 4,080 mg in 0.9% sodium chloride 100 mL CADD Cassette       Admin Date  08/08/2022 Action  New Bag Dose  4,080 mg Rate  2.1 mL/hr Route  IntraVENous Administered By  Mynor South RN              irinotecan (CAMPTOSAR) 281 mg in dextrose 5% 250 mL, overfill volume 25 mL chemo infusion       Admin Date  08/08/2022 Action  New Bag Dose  281 mg Rate  192.7 mL/hr Route  IntraVENous Administered By  Mynor South RN              leucovorin (WELLCOVORIN) 680 mg in dextrose 5% 250 mL, overfill volume 25 mL IVPB       Admin Date  08/08/2022 Action  New Bag Dose  680 mg Rate  154.5 mL/hr Route  IntraVENous Administered By  Mynor South RN              oxaliplatin (ELOXATIN) 144.5 mg in dextrose 5% 250 mL, overfill volume 25 mL chemo infusion       Admin Date  08/08/2022 Action  New Bag Dose  144.5 mg Rate  152 mL/hr Route  IntraVENous Administered By  Alicia Staples RN              palonosetron HCl (ALOXI) injection 0.25 mg       Admin Date  08/08/2022 Action  Given Dose  0.25 mg Route  IntraVENous Administered By  Alicia Staples RN                    Two nurses verified prior to administering:  Drug name  Drug dose  Infusion volume or drug volume when prepared in a syringe  Rate of administration  Route of administration  Expiration dates and/or times  Appearance and physical integrity of the drugs  Rate set on infusion pump, when used  Sequencing of drug administration          Mr. Nelda Manjarrez tolerated treatment well and was discharged from [place] [address] in stable condition at 1500 with CADD pump infusiong. He is to return on August 10 at 1400 for his next appointment.     Braden Go RN  August 8, 2022

## 2022-08-08 NOTE — PROGRESS NOTES
1. Have you been to the ER, urgent care clinic since your last visit? Hospitalized since your last visit? No    2. Have you seen or consulted any other health care providers outside of the [address] [place] [address] since your last visit? Include any pap smears or colon screening.  No        Chief Complaint   Patient presents with    Follow-up     Follow up

## 2022-08-09 DIAGNOSIS — C78.6 PERITONEAL CARCINOMATOSIS (HCC): ICD-10-CM

## 2022-08-09 DIAGNOSIS — R10.84 ABDOMINAL PAIN, GENERALIZED: ICD-10-CM

## 2022-08-09 DIAGNOSIS — C18.9 COLON ADENOCARCINOMA (HCC): ICD-10-CM

## 2022-08-09 RX ORDER — OXYCODONE HYDROCHLORIDE 10 MG/1
10 TABLET ORAL
Qty: 90 TABLET | Refills: 0 | Status: SHIPPED | OUTPATIENT
Start: 2022-08-09 | End: 2022-08-22 | Stop reason: SDUPTHER

## 2022-08-09 NOTE — TELEPHONE ENCOUNTER
Triage for Controlled Substance Refill Request    Pain Diagnosis: _Colon adenocarcinoma ([facility] [address] (C18.9); Abdominal pain, generalized (R10.84); Peritoneal carcinomatosis [facility]) (C78.6    Last Outpatient Visit: _8/1/2022    Next Outpatient Visit: _8/22/2022    Reason for refill needed outside of office visit? Appointment not scheduled prior to need for scheduled refill      Pharmacy: _[facility]/pharmacy #[id number]    Medication:oxyCODONE IR (ROXICODONE) 10 mg tab immediate release tablet     Dose and directions: Take 1 Tablet by mouth every four (4) hours as needed for Pain for up to 15 days.   Number dispensed:90  Date filled ( or Pharmacy):7/26/2022  # left: 5 tablets left      reviewed: _yes    Date of Urine Drug Screen:  _    Opioid Safety Handout given:  _yes    Appropriate for refill:  _yes    Action:  _ Medication pending

## 2022-08-09 NOTE — TELEPHONE ENCOUNTER
Call placed to patient to notify of prescription refill.  Unable to connect call receiving a recorded message \"Caller has incoming call restrictions\"

## 2022-08-09 NOTE — TELEPHONE ENCOUNTER
The patient called to request a refill on their oxycodone medication, 10 mg, 5 pills left. The patient uses [facility].   # [phone]

## 2022-08-10 ENCOUNTER — HOSPITAL ENCOUNTER (OUTPATIENT)
Dept: INFUSION THERAPY | Age: 47
Discharge: HOME | End: 2022-08-10
Payer: MEDICAID

## 2022-08-10 VITALS
TEMPERATURE: 97.9 F | OXYGEN SATURATION: 100 % | SYSTOLIC BLOOD PRESSURE: 140 MMHG | DIASTOLIC BLOOD PRESSURE: 92 MMHG | HEART RATE: 72 BPM | RESPIRATION RATE: 16 BRPM

## 2022-08-10 DIAGNOSIS — C18.9 COLON ADENOCARCINOMA (HCC): ICD-10-CM

## 2022-08-10 DIAGNOSIS — Z76.89 PREVENTION OF CHEMOTHERAPY-INDUCED NEUTROPENIA: ICD-10-CM

## 2022-08-10 DIAGNOSIS — C82.90 FOLLICULAR LYMPHOMA, UNSPECIFIED FOLLICULAR LYMPHOMA TYPE, UNSPECIFIED BODY REGION (HCC): ICD-10-CM

## 2022-08-10 DIAGNOSIS — T45.1X5A CHEMOTHERAPY INDUCED NEUTROPENIA (HCC): Primary | ICD-10-CM

## 2022-08-10 DIAGNOSIS — D70.1 CHEMOTHERAPY INDUCED NEUTROPENIA (HCC): Primary | ICD-10-CM

## 2022-08-10 PROCEDURE — 74011250636 HC RX REV CODE- 250/636: Performed by: NURSE PRACTITIONER

## 2022-08-10 PROCEDURE — 74011250636 HC RX REV CODE- 250/636: Performed by: INTERNAL MEDICINE

## 2022-08-10 PROCEDURE — 96377 APPLICATON ON-BODY INJECTOR: CPT

## 2022-08-10 PROCEDURE — 74011000250 HC RX REV CODE- 250: Performed by: INTERNAL MEDICINE

## 2022-08-10 RX ORDER — SODIUM CHLORIDE 0.9 % (FLUSH) 0.9 %
10 SYRINGE (ML) INJECTION AS NEEDED
Status: DISCONTINUED | OUTPATIENT
Start: 2022-08-10 | End: 2022-08-11 | Stop reason: HOSPADM

## 2022-08-10 RX ORDER — SODIUM CHLORIDE 9 MG/ML
10 INJECTION INTRAMUSCULAR; INTRAVENOUS; SUBCUTANEOUS AS NEEDED
Status: DISCONTINUED | OUTPATIENT
Start: 2022-08-10 | End: 2022-08-11 | Stop reason: HOSPADM

## 2022-08-10 RX ORDER — HEPARIN 100 UNIT/ML
300-500 SYRINGE INTRAVENOUS AS NEEDED
Status: DISCONTINUED | OUTPATIENT
Start: 2022-08-10 | End: 2022-08-11 | Stop reason: HOSPADM

## 2022-08-10 RX ADMIN — Medication 10 ML: at 15:25

## 2022-08-10 RX ADMIN — HEPARIN 500 UNITS: 100 SYRINGE at 15:25

## 2022-08-10 RX ADMIN — PEGFILGRASTIM 6 MG: KIT SUBCUTANEOUS at 15:27

## 2022-08-10 NOTE — PROGRESS NOTES
[facility] Progress Note    Date: August 10, 2022    Name: Carly Holbrook. MRN: [id number]         : 1975:            Mr. Hugo Beaver arrived ambulatory and in no distress for Pump Removal + Neulasta OBI. Assessment was completed, no acute issues at this time, no new complaints voiced. CADD completed in infusion center- 100 ml infused per order. Mr. Curry Greene vitals were reviewed. Patient Vitals for the past 12 hrs:   Temp Pulse Resp BP SpO2   08/10/22 1527 97.9 °F (36.6 °C) 72 16 (!) 140/92 100 %     Medications Administered       0.9% sodium chloride injection 10 mL       Admin Date  08/10/2022 Action  Given Dose  10 mL Route  IntraVENous Administered By  Ana Varghese RN              heparin (porcine) pf 300-500 Units       Admin Date  08/10/2022 Action  Given Dose  500 Units Route  InterCATHeter Administered By  Ana Varghese RN              pegfilgrastim (NEULASTA) wearable SQ injector 6 mg       Admin Date  08/10/2022 Action  Given Dose  6 mg Route  SubCUTAneous Administered By  Ana Varghese RN                  Education provided to patient about Neulasta On Body Injector including: side effects, how/when to remove the device, as well as what to do in the event of device malfunction. Opportunity for questions was provided and all questions were answered. Patient verbalized understanding. Mr. Hugo Beaver tolerated treatment well and was discharged from [place] [address] in stable condition. Port de-accessed, flushed & heparinized per protocol. He is to return on  at 0730 for his next appointment.     Gadiel Rodriguez RN  August 10, 2022

## 2022-08-15 ENCOUNTER — DOCUMENTATION ONLY (OUTPATIENT)
Dept: ONCOLOGY | Age: 47
End: 2022-08-15

## 2022-08-15 ENCOUNTER — TELEPHONE (OUTPATIENT)
Dept: ONCOLOGY | Age: 47
End: 2022-08-15

## 2022-08-15 ENCOUNTER — APPOINTMENT (OUTPATIENT)
Dept: INFUSION THERAPY | Age: 47
End: 2022-08-15

## 2022-08-15 NOTE — PROGRESS NOTES
Spoke with Mr. Omero Rendon over the phone; he would like to opt out of treatment with pegfilgrastim and instead periodically delay treatment to allow WBCs to recover naturally.     Roxana Sanderson, PHARMD, [facility]      For Pharmacy Admin Tracking Only    CPA in place: No  Recommendation Provided To: Provider: 1 via Verbally to provider  and Patient/Caregiver: 1 via Telephone  Intervention Detail: New Rx: 1, reason: Cost/Formulary Change  Intervention Accepted By: Provider: 1 and Patient/Caregiver: 1  Time Spent (min): 10

## 2022-08-15 NOTE — TELEPHONE ENCOUNTER
[address] at [facility] [address]  [phone]    08/15/22 11:08 AM - Jennifer Montanez and spoke with Peter Jeffery at [facility]. Inquired about the appeal process. She states she will resend the information that will walk this nurse through the appeal process. Provided our fax number. Will await fax from Peter Jeffery.

## 2022-08-16 NOTE — PROGRESS NOTES
[id number] [facility] at [facility]  [phone]    Hematology / Oncology Established Visit    Reason for Visit:   Jossue Lawrence is a 42 y.o. male who is seen for urgent follow up of colon cancer, h/o lymphoma. Hematology Oncology Treatment History:     Diagnosis #1: Follicular lymphoma  Stage: IV  Pathology:   11/13/18 right inguinal LN excision: Follicular lymphoma, high-grade (grade 3a of 3). Prior Treatment:   1. Obinutuzumab-CHOP. Obinutuzumab: 1000 mg weekly on days 1, 8, 15 for cycle 1, then 1000 mg on day 1 q21 days for cycles 2-6, then monotherapy 1000 mg every 21 days for cycle 7, 8 with Cyclophosphamide 750mg/m2, Doxorubicin 50mg/m2, Vincristine 1.4mg/m2 on day 1 and Prednisone 100mg on Days 1-5, every 21 days for a total of 2 cycles completed late 1/2019. Regimen discontinued due to STEMI. 2. Obinutuzumab + Bendamustine: 1000 mg Obinutuzumab on day 1 + Bendamustine 90mg/m2 on days 1-2 on a 28-day cycle x 4 cycles, completed 5/2019  Current Treatment: Surveillance      Diagnosis #2: Colon cancer:  Stage: IV  Pathology:  Ascending colon; biopsy: poorly differentiated carcinoma  Comment: No dysplasia is identified. Immunohistochemical stains performed on block 1A, show the tumor positive for Cam5.2 and shows patchy positivity for CDX2 with a Ki-67 index of -90%; the tumor is focally positive for CK5/6 and GATA3; negative for p63, Pax8, CK7, CK20, panmelanoma, synaptophysin and chromogranin. The immunophenotypic features are nonspecific but argues somewhat against the following carcinoma: urothelial, neuroendocrine and breast. The immunophenotypic features also argues against melanoma and somewhat against classic colorectal carcinoma. Additional immunohistochemical stains to exclude the possibility of prostate and hepatocellular carcinoma have been   ordered; the results will be reported as an addendum.   MLH1/MSH2/MSH6/PMS2 all intact with no loss of nuclear expression of MMR proteins - no MSI   Addendum: The addendum is issued to report the results of additional immunohistochemical stains in an attempt to ascertain the primary site of the carcinoma. The diagnosis is unchanged. Hepar and glypican 3 immunohistochemical stains are neg, arguing against hepatocellular carcinoma. PSA stain is negative, arguing against prostatic primary. Imaging studies to eval for poss primary sites maybe useful. In the absence of carcinoma/tumor at any other sites, this may represent an undifferentiated carcinoma of the colon. Oncogenomics (molecular) studies: POLE< ARID1A, YVONNE, ATR, BRCA2, CHECK1, FANCI, NF1, PIK3CA, PTCH1, PTEN, RAD50, RAD51, RB1, SMARCA4, STK11      Prior Treatment:   1. Loop ileostomy 2/19/22  2. Diagnotic laparoscopy with peritoneal biopsy, 4/27/22    Current Treatment: FOLFOXIRI every 2 weeks until disease progression or toxicity, 5/16/22 - current      Oncologic History:  Was in his usual state of health in early November 2018 when he developed low back pain and presented to the ER. Work-up at outside hospital revealed a retroperitoneal mass seen on CT imaging, and he was transferred to [facility] for further work-up. CT there showed a large retroperitoneal mass encircling the aorta with invasion of the left renal hilum and left adrenal gland. There were bilateral inguinal lymph nodes and moderate left hydronephrosis. He was evaluated while at [facility] and was noted to have palpable nodes in his groin for approximately the past 1 month. He underwent excisional LN biopsy of right inguinal LN, which revealed follicular lymphoma. At time of diagnosis, he had no cardiac disease aside from HTN, hyperlipidemia. However, he did have an NSTEMI after cycle 2 of O-CHOP. Was likely unrelated to chemotherapy, but opted to switch treatment to Obinutuzumab-Bendamustine. He completed treatment in 5/2019 and had a CR based on PET.     History of Present Illness:   Mr. Hodan Young is a 42 y.o. male with prior h/o follicular lymphoma is seen for follow up of colon cancer and C6 of treatment. Reports good appetite. Mild taste change. Mild nausea, no vomiting. Using nausea medication as ordered, not requiring every day. No diarrhea. Reports good ostomy output. Drinking well, feels well hydrated. Having cold sensitivity but no neuropathy symptoms. Continues to work about 4 days per month. PMHx: Lymphoma in 2018, CAD, HTN, Hyperlipidemia, Anxiety, chronic low back pain  PSurgHx: None aside from cardiac stent placement and port placement  SHx: Smokes 1/2ppd x past 20 yrs. Works part time as a cook. Has 2 children. FHx: Father had leukemia, passed away from pancreatic cancer. Review of Systems: A complete review of systems was obtained, negative except as described above. Physical Exam:     There were no vitals taken for this visit. ECOG PS: 0  General: no distress  Eyes: anicteric sclerae  HENT: oropharynx clear  Neck: supple  Lymphatic: no cervical, supraclavicular adenopathy  Respiratory: normal respiratory effort  CV: no peripheral edema, port upper chest   GI: soft, nontender, nondistended, no masses;  colostomy in place. Skin: no rashes; no ecchymoses; no petechiae      Results:     Lab Results   Component Value Date/Time    WBC 3.0 (L) 08/08/2022 07:58 AM    HGB 10.9 (L) 08/08/2022 07:58 AM    HCT 33.8 (L) 08/08/2022 07:58 AM    PLATELET 94 (L) 88/12/9758 07:58 AM    MCV 92.3 08/08/2022 07:58 AM    ABS.  NEUTROPHILS 1.7 (L) 08/08/2022 07:58 AM    Hemoglobin (POC) 15.0 06/05/2009 02:13 PM    Hematocrit (POC) 39 02/14/2019 01:24 PM     Lab Results   Component Value Date/Time    Sodium 143 08/08/2022 07:58 AM    Potassium 3.5 08/08/2022 07:58 AM    Chloride 111 (H) 08/08/2022 07:58 AM    CO2 26 08/08/2022 07:58 AM    Glucose 96 08/08/2022 07:58 AM    BUN 6 08/08/2022 07:58 AM    Creatinine 0.87 08/08/2022 07:58 AM    GFR est AA >60 08/08/2022 07:58 AM    GFR est non-AA >60 2022 07:58 AM    Calcium 9.1 2022 07:58 AM    Sodium (POC) 136 2019 01:24 PM    Potassium (POC) 3.9 2019 01:24 PM    Chloride (POC) 102 2019 01:24 PM    Glucose (POC) 249 (H) 02/15/2019 10:21 PM    BUN (POC) 14 2019 01:24 PM    Creatinine (POC) 0.9 2019 01:24 PM    Calcium, ionized (POC) 1.24 2019 01:24 PM     Lab Results   Component Value Date/Time    Bilirubin, total 0.4 2022 07:58 AM    ALT (SGPT) 36 2022 07:58 AM    Alk. phosphatase 139 (H) 2022 07:58 AM    Protein, total 6.3 (L) 2022 07:58 AM    Albumin 2.9 (L) 2022 07:58 AM    Globulin 3.4 2022 07:58 AM     Lab Results   Component Value Date/Time    Iron 18 (L) 2022 11:13 AM    TIBC 173 (L) 2022 11:13 AM    Iron % saturation 10 (L) 2022 11:13 AM    Ferritin 426 (H) 2022 11:13 AM       No results found for: B12LT, FOL, RBCF  Lab Results   Component Value Date/Time    TSH 1.53 2016 04:40 AM       18:       Labs at [facility]:  22: CA 19-9 = 390  22: CEA = 12.3  22: CEA = 19.2  22: CEA = 17.9   22: CEA = 6.0    Signatera MRD:  22: 295.97 MTM/mL  22: 2.98    Imagin/9/18 Abd/pelvis CT: IMPRESSION:  1. Interval development of a large retroperitoneal mass encircling the aorta with invasion of the left renal hilum and left adrenal gland. Several adjacent lymph nodes are seen extending into the peritoneum and underneath the  diaphragmatic natalie. This most likely represents lymphoma. 2. Several new bilateral enlarged inguinal lymph nodes also likely representing lymphoma. 3. Moderate left hydronephrosis with a delayed renal nephrogram related to decreased renal function. This is related to the invasion of the renal hilum. 18 Chest CT: IMPRESSION:  Trace left pleural effusion. Bilateral lower lobe atelectasis. Large  retroperitoneal mass lesion again demonstrated.     PET 12/18/18:  FINDINGS:  HEAD/NECK: Right palatine tonsil intense hypermetabolism, max SUV 18. Multilevel  bilateral cervical adenopathy, with max SUV 12 in a left supraclavicular node. Cerebral evaluation is limited by normal intense activity. CHEST: Solitary hypermetabolic left axillary node, max SUV 11. ABDOMEN/PELVIS: Bulky retroperitoneal mass max SUV 27, with several additional  small active abdomino-pelvic nodes. Bilateral inguinal nodes with max SUV 12 on  the left. SKELETON: No foci of abnormal hypermetabolism in the axial and visualized  appendicular skeleton. IMPRESSION:   1. Right palatine tonsil tumor involvement (Deauville 5). 2. Bilateral cervical delaney involvement (Deauville 5). 3. Left axillary node involvement (Deauville 5). 4. Bulky retroperitoneal lymphoma mass and additional smaller hypermetabolic  abdomino-pelvic nodes (Deauville 5). 5. Bilateral inguinal delaney involvement (Deauville 5). Deauville Five Point Scale  1. No uptake or no residual uptake (when used interim)  2. Slight uptake, but below blood pool (mediastinum)  3. Uptake above mediastinal, but below/equal to uptake in the liver  4. Uptake slightly to moderately higher than liver  5. Markedly increased uptake or any new lesion (on response evaluation)  Each FDG-avid (or previously FDG avid) lesion is rated independently. Reference values:  Mediastinal blood pool: 2.1 SUV  Liver (background): 2.2 SUV    PET/CT 2/05/19:   IMPRESSION:  1. No Foci of Abnormal Hypermetabolism (Deauville 1). 2. Resolved activity in the right palatine tonsil, bilateral cervical nodes,left axillary node, retroperitoneal/abdominal pelvic adenopathy, bilateral inguinal nodes. Echo 2/14/19:  Normal cavity size, wall thickness and systolic function (ejection fraction normal). The muscle mass is normal. The cavity shape is normal. The estimated ejection fraction is 41 - 45%. Abnormal wall motion as described on the wall scoring diagram below. End-systolic volume is normal. Normal left ventricular strain. There is mild (grade 1) left ventricular diastolic dysfunction. Normal left ventricular diastolic pressure. End-diastolic volume is normal.    LE arterial duplex 2/22/19:  There is evidence of left groin pseudoaneurysm noted arising from distal common femoral artery, pseudo lobe measures 2.32cm x 2.58cm and pseudo neck length measuring 0.63cm. There is no evidence of hemodynamically significant left lower extremity arterial obstruction. JACLYN is 1.03 on the right and 1.02 on the left. LE arterial duplex s/p Thrombin Injection to Pseudoaneurysm 2/26/19:  Successful thrombin injection procedure of the left groin with no further flow seen. No evidence of hemodnyamically significant obstruction in the left lower extremity. Left lower extremity arterial duplex performed. Confirmed pseudoaneurysm in left groin with small neck. Following thrombin injection, no further flow seen in the pseudoaneurysm. The left common femoral, profunda femoral, femoral, popliteal, posterior tibial and anterior arteries were imaged. Mainly triphasic flow was seen with no evidence of significantly elevated velocities. Repeat LE arterial duplex 2/27/19:  Continued thrombosed left groin pseudoaneurysm following thrombin injection on 02/26/2019. No flow or color fill is identified. The hematoma measures approximately 2.1 x 2.9 cm in diameter. The common femoral, deep femoral, femoral, and popliteal arteries are patent with mainly tri-phasic flow and no significant hemodynamically obstruction is noted. Stress 5/31/19:  Normal stress myocardial perfusion without ischemia or infact at 84% MPHR. Normal LV function. LVEF 60%. No EKG changes of ischemia at peak exercise. Normal functional capacity. PET 6/03/19: IMPRESSION: No Foci of Abnormal Hypermetabolism (Deauville 1). -MRI lumbar spine 12/18/19:  Mild disc degenerative change at L3-4 and L4-5.   Mild canal stenosis at L3-4 and mild left foraminal stenosis at L4-5. Other less severe degenerative findings are as described above. Continued diminished size of retroperitoneal mass-adenopathy,  with diminished soft tissue density at the left renal hilum. -CT chest/abdomen 12/16/19:  Findings are consistent with interval response to therapy    CT c/a/p 5/28/20: IMPRESSION:  Further contraction of the retroperitoneal mantle and chris mesentery,  compatible with treatment response  Stable left hilar soft tissue mass  Slight increased splaying of the duodenum and proximal jejunum is the result, without obstruction  No evidence for recurrence of lymphoma in the chest, abdomen, or pelvis    CT c/a/p 2/13/22:  FINDINGS:   LOWER THORAX: Dependent atelectasis with otherwise clear lungs. The visualized  heart is normal in size without pericardial effusion. LIVER: No mass. BILIARY TREE: Gallbladder is unremarkable. CBD is not dilated. SPLEEN: Unremarkable. PANCREAS: No mass or ductal dilatation. ADRENALS: Unremarkable. KIDNEYS: Atrophic left kidney with mild left hydronephrosis. Right kidney is  unremarkable with no stone, enhancing mass, or other renal abnormality. STOMACH: Unremarkable. SMALL BOWEL: No dilatation or wall thickening. COLON: Circumferential wall thickening at the hepatic flexure with luminal  narrowing, adjacent mesenteric stranding, and fluid with upstream retention in  the cecum and fecalization of distal ileal contents. No dilation or other wall  thickening. APPENDIX: Unremarkable. PERITONEUM: Moderate free fluid with no pneumoperitoneum. RETROPERITONEUM: Soft tissue stranding around the celiac and SMA and associated aorta with no discrete mass or adenopathy. Aorta is normal in size without aneurysm or dissection. REPRODUCTIVE ORGANS: Prostate and seminal vesicles appear unremarkable. URINARY BLADDER: No mass or calculus. BONES: No destructive bone lesion.   ABDOMINAL WALL: No mass or hernia. ADDITIONAL COMMENTS: N/A  IMPRESSION  1. Annular mass lesion of the right colon with upstream fecal retention  concerning for primary colon neoplasm versus less likely lymphoma. 2. Soft tissue stranding around celiac axis, SMA, and abdominal aorta may  reflect infiltrative lymphoma. 3. Left renal atrophy with left hydronephrosis likely reflecting chronic  proximal ureteral obstruction. 4. Incidentals as above. 2/24/22 CT abd/pelvis at [facility]:  FINDINGS: An enteric tube with sidehole beyond the level of the lower esophageal sphincter is seen looping on itself in the proximal stomach before terminating in the mid stomach. Status post right lower quadrant diverting ileostomy for an obstructing colonic mass. Multiple loops of gas dilated small bowel remain, with a maximal diameter of 5.4 cm whereas previously measured 3.6 cm. Dilute contrast is seen within the lateral left abdominal dilated small bowel. Moderate stool burden and bowel gas opacifies the cecum, measuring 7.3 cm in diameter. No definite supine evidence of pneumoperitoneum. Lung bases: Limited evaluation of the lung bases demonstrates a cardiac silhouette within normal limits for size. A small bore central venous catheter is seen terminating in the right atrium. No focal consolidation or pleural effusion. 2/24/22 CT abd/pelvis [facility]:  IMPRESSION:  1. Status post right lower quadrant loop ileostomy. Mild diffuse dilation of small bowel proximal to the ostomy in the lower abdomen and upper pelvis concerning for at least mild to moderate partial bowel obstruction. Relatively decompressed loop ileostomy. 2. Mildly complex pelvic fluid collection in the rectovesical space, decreased in size from prior. 3. Redemonstrated ascending colon mass and findings suspicious for regional metastatic disease. 4. Redemonstrated soft tissue in the retroperitoneum with prominent lymph nodes, similar to prior examination.  Differential would include metastasis, retroperitoneal fibrosis. 5. Mild atherosclerosis. 6. Subcentimeter right renal hypodensity, too small to characterize. 7. Severe compression of the left renal vein, similar to prior examination. 8. Additional findings as described above. Bones: No acute osseous abnormality. 2/24/22 CT chest [facility]:  IMPRESSION:  FINAL report. 1. No evidence of metastatic disease to the chest.  2. No enlarged lymph nodes. 3. Increasing volume loss in the lung bases which may be due to atelectasis. 4. CT abdomen and pelvis reported separately. 2/25/22 Colonoscopy at [facility]:  Impression:            - Preparation of the colon was inadequate. - Stool in the entire examined colon. - Likely malignant completely obstructing tumor at the                         hepatic flexure. Biopsied.                        - Malignant-appearing tumor in the colon. Complications:         No immediate complications. 7/19/22 CT ch/abd/pelv:  IMPRESSION  Response to treatment characterized by decrease in size of the apical core  lesion in the proximal transverse colon and decreased mucosal colic  lymphadenopathy. Persistent mesenteric lymphadenopathy and retroperitoneal/mesenteric soft tissue  which may relate to the patient's history of lymphoma. Chronic left renal atrophy with chronic left hydronephrosis. RECIST:  Target lesions:  Transverse colon mass, series 2, image 75, 27 x 26 mm, previously 49 x 45 mm. Nontarget lesions:  Transverse mesocolic lymph nodes, decreased. Assessment & Plan:   Juan Hensley. is a 42 y.o. male comes in for evaluation and management of lymphoma. 1. Undifferentiated carcinoma of transverse colon, metastatic, KRAS/NRAS status unclear:  S/p diverting ileostomy due to large bowel obstruction. Colonoscopy with biopsy on 2/25/22.  No obvious metastatic disease on CT imaging, but did have obvious metastatic disease to peritoneum during laparoscopy with Dr. Jose Gabriel. I recommended FOLFOXIRI every 2 weeks. Will not give Bevacizumab given h/o MRI and tobacco use. [employer] suggests: BRCA2 and YVONNE mutations support use of Olaparib or similar PARP inhibitor in future. They also suggest testing for TMB, MSI, PDL1 to determine utility of checkpoint inhibitors. CT after 4 cycles of treatment shows response to treatment with decrease size in transverse colon mass on 7/19/22. Supportive medications: zofran, compazine, dexamethasone, EMLA topical  -- Proceed with C7 of FOLFOXIRI without neulasta support. Consider adding Ziextendo if persistent neutropenia. -- Repeat [facility] testing as scheduled, next due today, 8/22/22. Looking into adding [employer] or alternate NGS testing to determine TMB, MSI, PDL1, KRAS/NRAS/BRAF. -- Check CEA every 8 weeks  -- Consider repeat colonoscopy in 4-6 months given incomplete colonoscopy. -- Repeat CT of ch/abd/pelvis after C8.   -- Follow up in 2 weeks C8 FOLFOXIRI, MD/NP visit. 2. H/o Follicular lymphoma:   Grade 3a with with bulky disease encircling the aorta, causing pain. Bone marrow negative for lymphoma, but was hypercellular. BR better than RCHOP, but based on GALLIUM study, Obinutuzumab-based induction and maintenance prolongs PFS over that seen with rituximab-based therapy. Therefore, pt started on O-CHOP regimen. He received 2 cycles with CR and was then switched to BR x 4 cycles given STEMI. Completed treatment 5/2019. PET completed 6/3/19 showed CR. CT 5/28/20 negative for disease. No extra surveillance needed at this time given metastatic colon cancer with frequent imaging. 3. Chronic lumbar back pain / anxiety / RLQ / insomnia:   Left lower back pain is chronic/stable and RLQ is likely related to neoplasm/colostomy - currently managing pain on Oxycontin 10mg q12h, Oxycodone q4h and Lexapro daily. Signed pain contract on 12/28/18 and following with Dr. Tyrone Reid.  Trazodone, melatonin not helping insomnia. Reviewed sleep hygiene. 4. CAD / HTN:   h/o STEMI 2/14/29 with TOM placed to proximal LAD. Following with Dr. Kelli Kitchen and remains on dual antiplatelet therapy, high dose Lipitor, Metoprolol, ACEI. Received only 2 doses of cardiotoxic chemo, Doxorubicin in early 1/2019. Is overdue for follow up with Dr. Kelli Kitchen. 5. Tobacco abuse:   Discussed benefits of quitting smoking again. Previously discussed replacing hand-to-mouth habit with another item such as Twizzlers or SlimJims. 6. BRCA2 mutation:  Will discuss with patient in future. He would benefit from genetic counseling for himself and his family. 7. Weight-loss/ fatigue:   Improving. Encouraged supplementing with 2 boost/ensures daily in addition to meals and 60 oz water daily and advised scheduled antiemetics on days 4-6.     8. H/o chemotherapy induced neutropenia:  Neulasta added with C3 forward. Advised neutropenic precautions. Held with C5 due to insurance authorization. Added back with C6 and reached out to auth team.    Goals of care: Disease is not curable, but is treatable to improve quality and duration of life.        Signed By: Evita Leal NP     August 16, 2022

## 2022-08-17 ENCOUNTER — APPOINTMENT (OUTPATIENT)
Dept: INFUSION THERAPY | Age: 47
End: 2022-08-17

## 2022-08-18 ENCOUNTER — TELEPHONE (OUTPATIENT)
Dept: PALLATIVE CARE | Age: 47
End: 2022-08-18

## 2022-08-18 ENCOUNTER — TELEPHONE (OUTPATIENT)
Dept: ONCOLOGY | Age: 47
End: 2022-08-18

## 2022-08-18 NOTE — TELEPHONE ENCOUNTER
Called patient to advise/confirm upcoming appt with Dr. Dulce Borja on 8/22/22 at 8:30 at [facility]. Spoke with Lorenza Zelaya and confirmed appointment. Also advised to please bring in your 's License and Insurance Card and any pain medications in the original container with you to appointment.

## 2022-08-20 NOTE — PROGRESS NOTES
Palliative Medicine Outpatient Services  [place]: [phone] ([phone])    Patient Name: Emely Gordillo YOB: 1975     Date of Current Visit: 08/22/22   Location of Current Visit:    [] [facility] Office  [x] [facility] Office  [] [address] [place] [address]  [] Home  [] Other:  televisit    Date of Initial Visit: 2/19/19   Referral from: Lowell Grover MD  Primary Care Physician: Marlene Tapia MD      SUMMARY:   Emely Gordillo is a 42y.o. year old with high-grade follicular lymphoma, who was referred to Palliative Medicine by Dr. Thong Henson for symptom management and supportive care. He was diagnosed in 11/2018 after presenting with back pain, treated with systemic chemotherapy with complete response. He suffered cardiac arrest during cycle #3 due to previously undiagnosed severe stenosis of the LAD s/p PTCA and stent. He was diagnosed with poorly differentiated carcinoma of the colon (no evidence of metastatic disease) in 2/14/22 and underwent loop ileostomy at [address] [place] [address] on 2/19/22. He underwent exploratory laparoscopy in 4/2022 which revealed a large tumor at the hepatic flexure peritoneal carcinomatosis. He is currently being treated with systemic chemotherapy under the care of Dr. Jarocho Humphrey. The patients social history includes: he is single. He lives in [place] with his girlfriend, Christo Horn, and their 12-month old son. Tierney Stroud He has 3 teenage children who live with their mother and with whom he has close contact. 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 Palliative Medicine is addressing the following current patient/family concerns: abdominal pain related to malignancy; anxiety, depression; left mid- and low back pain, poor appetite, fatigue, advanced care planning. Initial Referral Intake note from Darrel Oneil RN reviewed prior to visit   PALLIATIVE DIAGNOSES:       ICD-10-CM ICD-9-CM    1.  Abdominal pain, generalized  R10.84 789.07 oxyCODONE IR (ROXICODONE) 10 mg tab immediate release tablet      oxyCODONE ER (OxyCONTIN) 10 mg ER tablet      2. Right arm pain  M79.601 729.5       3. Anxiety  F41.9 300.00       4. Reactive depression  F32.9 300.4       5. Poor appetite  R63.0 783.0       6. Nausea without vomiting  R11.0 787.02       7. Other fatigue  R53.83 780.79       8. Colon adenocarcinoma (HCC)  C18.9 153.9 oxyCODONE IR (ROXICODONE) 10 mg tab immediate release tablet      oxyCODONE ER (OxyCONTIN) 10 mg ER tablet      9. Peritoneal carcinomatosis (HCC)  C78.6 197.6 oxyCODONE IR (ROXICODONE) 10 mg tab immediate release tablet      oxyCODONE ER (OxyCONTIN) 10 mg ER tablet      10. Long-term current use of opiate analgesic  Z79.891 V58.69 TOXASSURE SELECT 13 (MW)                 PLAN:     Patient Instructions   Dear Tamra Patricio. ,    It was a pleasure seeing you today in [facility]. We will see you again in 6 weeks for an office visit to coordinate with your infusion. If labs or imaging tests have been ordered for you today, please call the office  at [phone] 48 hours after completion to obtain the results. Your described symptoms were: Fatigue: 5 Drowsiness: 5   Depression: 1 Pain: 5   Anxiety: 8 Nausea: 2   Anorexia: 1 Dyspnea: 1   Best Well-Bein Constipation: Yes   Other Problem (Comment): 0       This is the plan we talked about:    1. Abdominal pain  -Continue oxycodone 10-mg every 4 hours as needed  -Discontinue fentanyl patch as this was ineffective in helping with your pain  -Today I again prescribed extended-release oxycodone 10-mg every 12 hours  -Extended-release oxycodone was more effective overall in managing your pain, especially your early morning pain  -Today you provided a urine sample for tox screen in accordance with our practice's safer opioid prescribing guidelines    2.  Right arm pain  -The etiology (cause) of this pain is unclear at this point  -Dr. Omero Lobato has referred you for a port study  -Please follow-up with making an appointment with your cardiologist    3. Depression/anxiety  -Continue Lexapro 20-mg daily  -I'm avoiding benzodiazepines due to synergistic effect with opioids    4. Poor appetite/weight loss  -Continue eating your one good meal a day  -Try to eat smaller amounts of food throughout the day (4 to 5 times) to keep up with your nutritional needs  -You've been maintaining your weight in the 129-132 pound range     5. Fatigue  -This is chronic and unchanged   -You're sleeping about 5 hours at night    6. Nausea  -Continue ondansetron 8-mg every 8 hours as needed  -Continue prochlorperazine 10-mg every 6 hours as needed    7. Colon mass  -You're receiving chemotherapy under the care of Dr. Noni Dominguez  -Your recent scans on 7/19/22 showed response to your therapy    This is what you have shared with us about Advance Care Planning:      Primary Decision Maker: Sigrid De La Fuenteienangelique - [phone]  Advance Care Planning 8/22/2022   Patient's Healthcare Decision Maker is: Legal Next of Kin   Confirm Advance Directive None   Patient Would Like to Complete Advance Directive -   Does the patient have other document types -           The Palliative Medicine Team is here to support you and your family.        Sincerely,      Leo Yanez MD and the Palliative Medicine Team     GOALS OF CARE / TREATMENT PREFERENCES:   [====Goals of Care====]  GOALS OF CARE:  Patient / health care proxy stated goals: See Patient Instructions / Summary    TREATMENT PREFERENCES:   Code Status:  [x] Attempt Resuscitation       [] Do Not Attempt Resuscitation    Advance Care Planning:  [x] The [facility] Interdisciplinary Team has updated the ACP Navigator with Decision Maker and Patient Capacity      Primary Decision Maker: Sigrid De La Fuenteiend - [phone]    [] Named in a scanned document   [x] Legal Next of Kin  [] Guardian    Other:  (If patient appropriate for POST, consider using PALLPOST smart phrase here)    The palliative care team has discussed with patient / health care proxy about goals of care / treatment preferences for patient.  [====Goals of Care====]     PRESCRIPTIONS GIVEN:     Medications Ordered Today   Medications    oxyCODONE IR (ROXICODONE) 10 mg tab immediate release tablet     Sig: Take 1 Tablet by mouth every four (4) hours as needed for Pain for up to 15 days. Max Daily Amount: 60 mg. Dispense:  90 Tablet     Refill:  0    oxyCODONE ER (OxyCONTIN) 10 mg ER tablet     Sig: Take 1 Tablet by mouth every twelve (12) hours for 30 days. Max Daily Amount: 20 mg. Dispense:  60 Tablet     Refill:  0               FOLLOW UP:     Future Appointments   Date Time Provider Dahlia Epps   8/29/2022  7:30 AM [facility]   8/29/2022  8:15 AM Seferino Porras MD [facility]B   8/31/2022  2:30 PM [facility]   9/12/2022  7:30 AM [facility]   9/14/2022  1:30 PM SS INF6 CH4 <1H [facility]   9/26/2022  7:30 AM [facility]   9/28/2022  2:30 PM SS INF6 CH4 <1H [facility]   10/10/2022  7:30 AM [facility]   10/12/2022  1:30 PM SS INF6 CH4 <1H [facility]           PHYSICIANS INVOLVED IN CARE:   Patient Care Team:  Ren Cagle MD as PCP - General (Family Medicine)  Seferino Porras MD (Hematology and Oncology)  Ansley Camacho MD as Physician (Palliative Medicine)  Ansley Camacho MD as Physician (Palliative Medicine)       HISTORY:   Reviewed patient-completed ESAS and advance care planning form. Reviewed patient record in prescription monitoring program.    CHIEF COMPLAINT:   Chief Complaint   Patient presents with    Abdominal Pain           HPI/SUBJECTIVE:    The patient is: [x] Verbal / [] Nonverbal     He started the fentanyl patch several weeks ago and doesn't feel it's made a difference in his pain.   He continues to take 5 to 6 oxycodone/day    See Plan/Patient Instructions for additional interval history      From visit 3/2/22:  He started having abdominal pain about a month ago. Then he started feeling nauseous. He had trouble with bowel movements, feeling like he wasn't completing emptying his bowels. He went to the ED on 2/14, CT scan showed mass in his colon. He was hospitalized at [facility] 2/15-2/25 for colonoscopy and loop ileostomy. He's still having pain in his abdomen. He's been taking 2 oxycodone every 4 to 6 hours which eases the pain to ~5/10 which is tolerable. He's always anxious. He continues to take Lexapro. He's eating, not as much as he used to. He ate 2 PBJs yesterday. He had mild nausea for a few days after he came home. He's passing formed stool in his ostomy bag daily. Home health is coming to his home. He sees Dr. Omero Lobato next week. From IV 2/19/19:  He has a lot of anxiety. He's lived with anxiety for a long time, sometimes it's been worse than others, like when he lost his job and lost his insurance. He took Wellbutrin then which made him more anxious and depressed. He's had more anxiety since he was diagnosed with lymphoma. He's been taking lorazepam and it doesn't help at all, even when he tried taking 2 pills. This is his biggest problem now. He feels depressed but it's not as bad as the anxiety. He has pain in his back, that's what brought him to the hospital in November. He's been taking oxycodone which helps some. The groin pain started after his operation (cardiac cath) in the hospital last week. He expects that will get better as it heals. His pain doesn't bother him too much, not like the anxiety. His appetite is getting better. He's lost ~30# since last fall but that's leveled off now. He's moving his bowels regularly. He sometimes gets short of breath but not as much as used to. He doesn't have chest pain, never did. He sleeps well at night.  He doesn't have the energy to do much during the day. He lives with his father. He sees his three teen-age children frequently (they live with their mother). His children give him a lot of strength. Clinical Pain Assessment (nonverbal scale for nonverbal patients):   [++++ Clinical Pain Assessment++++]  [++++Pain Severity++++]: Pain: 5  [++++Pain Character++++]: stabbing pain in back  [++++Pain Duration++++]: months for back pain, weeks for groin pain  [++++Pain Effect++++]: little  [++++Pain Factors++++]: oxycodone helps with back pain, groin pain elicited by standing and walking  [++++Pain Frequency++++]: constant back pain with varying intensity  [++++Pain Location++++]: left lower back  [++++ Clinical Pain Assessment++++]    [++++ Clinical Pain Assessment++++]  [++++Pain Severity++++]: Pain: 5  [++++Pain Character++++]: deep, aching  [++++Pain Duration++++]: since 2/2022  [++++Pain Effect++++]: limits function, emotional distress  [++++Pain Factors++++]: unable to identify provoking factors  [++++Pain Frequency++++]: constant  [++++Pain Location++++]: right lower abdomen  [++++ Clinical Pain Assessment++++]         FUNCTIONAL ASSESSMENT:     Palliative Performance Scale (PPS):  PPS: 70       PSYCHOSOCIAL/SPIRITUAL SCREENING:     Any spiritual / Zoroastrianism concerns:  [] Yes /  [x] No    Caregiver Burnout:  [] Yes /  [x] No /  [] No Caregiver Present      Anticipatory grief assessment:   [x] Normal  / [] Maladaptive       ESAS Anxiety: Anxiety: 8    ESAS Depression: Depression: 1       REVIEW OF SYSTEMS:     The following systems were [x] reviewed / [] unable to be reviewed  Systems: constitutional, ears/nose/mouth/throat, respiratory, gastrointestinal, genitourinary, musculoskeletal, integumentary, neurologic, psychiatric, endocrine. Positive findings noted below.   Modified ESAS Completed by: provider   Fatigue: 5 Drowsiness: 5   Depression: 1 Pain: 5   Anxiety: 8 Nausea: 2   Anorexia: 1 Dyspnea: 1   Best Well-Bein Constipation: Yes   Other Problem (Comment): 0          PHYSICAL EXAM:     Wt Readings from Last 3 Encounters:   22 134 lb (60.8 kg)   22 134 lb (60.8 kg)   22 134 lb 12.8 oz (61.1 kg)     Blood pressure (!) 144/94, pulse 69, temperature 97 °F (36.1 °C), temperature source Oral, resp. rate 20, height 5' 7\" (1.702 m), weight 134 lb (60.8 kg), SpO2 98 %.   Last bowel movement: See Nursing Note         Constitutional: appears fatigued, ill  Eyes: pupils equal, anicteric  ENMT: no nasal discharge, moist mucous membranes  Cardiovascular: regular rhythm, distal pulses intact; right ulnar and radial pulses intact  Respiratory: breathing not labored, symmetric  Gastrointestinal: soft non-tender, +bowel sounds  Musculoskeletal: no deformity, no tenderness to palpation  Skin: warm, dry  Neurologic: following commands, moving all extremities  Psychiatric: full affect, no hallucinations  Other:       HISTORY:     Past Medical History:   Diagnosis Date    Anxiety     Anxiety and depression     Cancer ([address]     lymphoma 2018 receiving chemo    Cancer ([address] 2022    COLON    Chronic pain     lower back- lymphoma    Hyperlipidemia     Hypertension     NO MEDICATION FOR PAST 9 MONTHS    Lymphadenopathy 2018    Seizures ([address] CHILDHOOD    NEVER TOOK MEDICATION - \"GREW OUT OF THEM\"      Past Surgical History:   Procedure Laterality Date    HX COLONOSCOPY      HX HEART CATHETERIZATION  2019    HX ILEOSTOMY      HX OTHER SURGICAL  2019    cardiac stent    IR INJECTION PSEUDOANEURYSM  2019    MT ABDOMEN SURGERY PROC UNLISTED  2022    COLON RESECTION WITH ILEOSTOMY    MT CARDIAC SURG PROCEDURE UNLIST  2019    STENT X1      Family History   Problem Relation Age of Onset    Hypertension Father     Diabetes Father     Cancer Father         PROSTATE    Diabetes Mother     No Known Problems Brother     No Known Problems Brother     Anesth Problems Neg Hx       History reviewed, no pertinent family history. Social History     Tobacco Use    Smoking status: Every Day     Packs/day: 0.50     Years: 20.00     Pack years: 10.00     Types: Cigarettes    Smokeless tobacco: Never    Tobacco comments:     \"STOP SMOKING\" PACKET GIVEN TO PATIENT   Substance Use Topics    Alcohol use: No     No Known Allergies   Current Outpatient Medications   Medication Sig    oxyCODONE IR (ROXICODONE) 10 mg tab immediate release tablet Take 1 Tablet by mouth every four (4) hours as needed for Pain for up to 15 days. Max Daily Amount: 60 mg.    oxyCODONE ER (OxyCONTIN) 10 mg ER tablet Take 1 Tablet by mouth every twelve (12) hours for 30 days. Max Daily Amount: 20 mg.    loperamide (Imodium A-D) 2 mg capsule Take 1 Capsule by mouth four (4) times daily as needed for Diarrhea. ondansetron hcl (ZOFRAN) 8 mg tablet Take 1 Tablet by mouth every eight (8) hours as needed for Nausea or Vomiting. prochlorperazine (Compazine) 10 mg tablet Take 1 Tablet by mouth every six (6) hours as needed for Nausea or Vomiting. dexAMETHasone (DECADRON) 4 mg tablet Take 8mg (2 x tabs) on days 2 and 3 after treatment to prevent nausea. Take in the AM with food. multivitamin (ONE A DAY) tablet Take 1 Tablet by mouth daily. atorvastatin (LIPITOR) 40 mg tablet TAKE 1 TAB BY MOUTH NIGHTLY. INDICATIONS: TREATMENT TO SLOW PROGRESSION OF CORONARY ARTERY DISEASE    lidocaine-prilocaine (EMLA) topical cream Apply a dime size amount to port site 30 minutes before treatment to prevent pain. (Patient not taking: Reported on 8/22/2022)    senna-docusate (PERICOLACE) 8.6-50 mg per tablet Take 1 Tablet by mouth daily as needed for Constipation. (Patient not taking: No sig reported)     No current facility-administered medications for this visit.           LAB DATA REVIEWED:     Lab Results   Component Value Date/Time    WBC 5.0 08/22/2022 07:47 AM    HGB 11.0 (L) 08/22/2022 07:47 AM    PLATELET 73 (L) 75/76/7436 07:47 AM     Lab Results Component Value Date/Time    Sodium 141 08/22/2022 07:47 AM    Potassium 3.5 08/22/2022 07:47 AM    Chloride 110 (H) 08/22/2022 07:47 AM    CO2 26 08/22/2022 07:47 AM    BUN 10 08/22/2022 07:47 AM    Creatinine 0.96 08/22/2022 07:47 AM    Calcium 9.3 08/22/2022 07:47 AM    Magnesium 1.7 01/12/2019 04:05 AM    Phosphorus 2.0 (L) 01/12/2019 04:05 AM      Lab Results   Component Value Date/Time    Alk. phosphatase 189 (H) 08/22/2022 07:47 AM    Protein, total 6.6 08/22/2022 07:47 AM    Albumin 3.1 (L) 08/22/2022 07:47 AM    Globulin 3.5 08/22/2022 07:47 AM     Lab Results   Component Value Date/Time    INR 1.1 02/22/2019 08:18 PM    Prothrombin time 10.8 02/22/2019 08:18 PM    aPTT 27.8 02/22/2019 08:18 PM      Lab Results   Component Value Date/Time    Iron 18 (L) 05/06/2022 11:13 AM    TIBC 173 (L) 05/06/2022 11:13 AM    Iron % saturation 10 (L) 05/06/2022 11:13 AM    Ferritin 426 (H) 05/06/2022 11:13 AM          MRI lumbar spine 12/18/19:  Congenital narrowing of the lumbar canal. Vertebral body heights are maintained. Marrow signal is normal.     The conus medullaris terminates at T12/L1. Signal and caliber of the distal  spinal cord are within normal limits. There is no pathologic intrathecal  enhancement. The paraspinal soft tissues are within normal limits. Lower thoracic spine: No herniation or stenosis. L1-L2: No herniation or stenosis. L2-L3: No herniation or stenosis. L3-L4: Mild facet arthropathy. Minimal central disc protrusion. Mild canal  stenosis. Foramina are patent  L4-L5: Desiccation. Mild facet arthropathy. Canal demonstrates minimal stenosis. There is an annular ligament tear far laterally on the left. Mild left foraminal  stenosis. L5-S1: Mild facet arthropathy. Canal and foramina are patent. IMPRESSION:  Mild disc degenerative change at L3-4 and L4-5. Mild canal stenosis at L3-4 and mild left foraminal stenosis at L4-5.   Other less severe degenerative findings are as described above. CT chest/abdomen 12/16/19:  THYROID: No nodule. MEDIASTINUM: No mass or lymphadenopathy. Port catheter in place on the right. Catheter tip in appropriate position. SB: No mass or lymphadenopathy. THORACIC AORTA: No dissection or aneurysm. MAIN PULMONARY ARTERY: Unremarkable  TRACHEA/BRONCHI: Unremarkable  ESOPHAGUS: No wall thickening or dilatation. HEART: Normal in size. PLEURA: No effusion or pneumothorax. LUNGS: Bibasilar atelectasis is noted. Bula Dayhoff LIVER: No mass or biliary dilatation. GALLBLADDER: Layering contrast versus cholelithiasis. SPLEEN: No mass. PANCREAS: No mass or ductal dilatation. ADRENALS: The left adrenal gland is elevated by the retroperitoneal mass. The    right is unremarkable. KIDNEYS: There is diminished soft tissue density at the level of the left renal  hilum. There is increased left renal cortical atrophy. STOMACH: Unremarkable. SMALL BOWEL: No dilatation or wall thickening. COLON: No dilatation or wall thickening. APPENDIX: Unremarkable. PERITONEUM: No ascites or pneumoperitoneum. RETROPERITONEUM:   Diminished size of retroperitoneal mass. Diminished in size with margins difficult to fully ascertain. 2-62 35 x 50 mm previously 71 x 94 mm.     2-67 left periaortic adenopathy 25 x 17 mm  The SMA, celiac, and LIZZY are remain encased, diminished attenuation of these  vessels. REPRODUCTIVE ORGANS: The prostate and seminal vesicles are unremarkable. URINARY  BLADDER: Unremarkable  BONES: No destructive bone lesion. ADDITIONAL COMMENTS: Resolved left inguinal pseudoaneurysm. Rebekah Santillan IMPRESSION:    Baseline research examination. Findings are consistent with interval response to therapy. Continued diminished size of retroperitoneal mass-adenopathy,  with diminished soft tissue density at the left renal hilum. CT chest/abdomen/pelvis 2/14/22:  1.  Annular mass lesion of the right colon with upstream fecal retention  concerning for primary colon neoplasm versus less likely lymphoma. 2. Soft tissue stranding around celiac axis, SMA, and abdominal aorta may  reflect infiltrative lymphoma. 3. Left renal atrophy with left hydronephrosis likely reflecting chronic  proximal ureteral obstruction. 4. Incidentals as above. CT chest/abdomen/pelvis 7/19/22:  Response to treatment characterized by decrease in size of the apical core  lesion in the proximal transverse colon and decreased mucosal colic  lymphadenopathy. Persistent mesenteric lymphadenopathy and retroperitoneal/mesenteric soft tissue  which may relate to the patient's history of lymphoma. Chronic left renal atrophy with chronic left hydronephrosis.       CONTROLLED SUBSTANCES SAFETY ASSESSMENT (IF ON CONTROLLED SUBSTANCES):     Reviewed opioid safety handout:  [x] Yes   [] No  24 hour opioid dose >150mg morphine equivalent/day:  [] Yes   [x] No  Benzodiazepines:  [] Yes   [x] No  Sleep apnea:  [] Yes   [x] No  Urine Toxicology Testing within last 6 months:  [x] Yes   [] No 8/22/22 - results pending  History of or new aberrant medication taking behaviors:  [] Yes   [x] No  Has Narcan been prescribed [x] Yes   [] No          Total time:   Counseling / coordination time:   > 50% counseling / coordination?:

## 2022-08-22 ENCOUNTER — OFFICE VISIT (OUTPATIENT)
Dept: PALLATIVE CARE | Age: 47
End: 2022-08-22
Payer: MEDICAID

## 2022-08-22 ENCOUNTER — OFFICE VISIT (OUTPATIENT)
Dept: ONCOLOGY | Age: 47
End: 2022-08-22

## 2022-08-22 ENCOUNTER — HOSPITAL ENCOUNTER (OUTPATIENT)
Dept: INFUSION THERAPY | Age: 47
Discharge: HOME | End: 2022-08-22
Payer: MEDICAID

## 2022-08-22 ENCOUNTER — TELEPHONE (OUTPATIENT)
Dept: ONCOLOGY | Age: 47
End: 2022-08-22

## 2022-08-22 VITALS
HEART RATE: 69 BPM | BODY MASS INDEX: 21.03 KG/M2 | TEMPERATURE: 97 F | RESPIRATION RATE: 20 BRPM | WEIGHT: 134 LBS | SYSTOLIC BLOOD PRESSURE: 144 MMHG | OXYGEN SATURATION: 98 % | HEIGHT: 67 IN | DIASTOLIC BLOOD PRESSURE: 94 MMHG

## 2022-08-22 VITALS
SYSTOLIC BLOOD PRESSURE: 130 MMHG | DIASTOLIC BLOOD PRESSURE: 83 MMHG | HEIGHT: 67 IN | OXYGEN SATURATION: 97 % | TEMPERATURE: 97.3 F | RESPIRATION RATE: 18 BRPM | BODY MASS INDEX: 21.03 KG/M2 | WEIGHT: 134 LBS | HEART RATE: 75 BPM

## 2022-08-22 VITALS
WEIGHT: 134.8 LBS | BODY MASS INDEX: 21.16 KG/M2 | HEIGHT: 67 IN | RESPIRATION RATE: 18 BRPM | SYSTOLIC BLOOD PRESSURE: 130 MMHG | TEMPERATURE: 97.3 F | DIASTOLIC BLOOD PRESSURE: 83 MMHG | OXYGEN SATURATION: 97 % | HEART RATE: 75 BPM

## 2022-08-22 DIAGNOSIS — C78.6 PERITONEAL CARCINOMATOSIS (HCC): ICD-10-CM

## 2022-08-22 DIAGNOSIS — R53.0 NEOPLASTIC MALIGNANT RELATED FATIGUE: ICD-10-CM

## 2022-08-22 DIAGNOSIS — R63.0 POOR APPETITE: ICD-10-CM

## 2022-08-22 DIAGNOSIS — C18.4 CARCINOMA OF TRANSVERSE COLON (HCC): Primary | ICD-10-CM

## 2022-08-22 DIAGNOSIS — C18.9 COLON ADENOCARCINOMA (HCC): ICD-10-CM

## 2022-08-22 DIAGNOSIS — R10.84 ABDOMINAL PAIN, GENERALIZED: Primary | ICD-10-CM

## 2022-08-22 DIAGNOSIS — R11.0 NAUSEA WITHOUT VOMITING: ICD-10-CM

## 2022-08-22 DIAGNOSIS — T45.1X5A CHEMOTHERAPY INDUCED DIARRHEA: ICD-10-CM

## 2022-08-22 DIAGNOSIS — Z79.891 LONG-TERM CURRENT USE OF OPIATE ANALGESIC: ICD-10-CM

## 2022-08-22 DIAGNOSIS — C82.90 FOLLICULAR LYMPHOMA, UNSPECIFIED FOLLICULAR LYMPHOMA TYPE, UNSPECIFIED BODY REGION (HCC): ICD-10-CM

## 2022-08-22 DIAGNOSIS — D70.1 CHEMOTHERAPY INDUCED NEUTROPENIA (HCC): Primary | ICD-10-CM

## 2022-08-22 DIAGNOSIS — M79.601 RIGHT ARM PAIN: ICD-10-CM

## 2022-08-22 DIAGNOSIS — Z76.89 PREVENTION OF CHEMOTHERAPY-INDUCED NEUTROPENIA: ICD-10-CM

## 2022-08-22 DIAGNOSIS — F32.9 REACTIVE DEPRESSION: ICD-10-CM

## 2022-08-22 DIAGNOSIS — K52.1 CHEMOTHERAPY INDUCED DIARRHEA: ICD-10-CM

## 2022-08-22 DIAGNOSIS — R53.83 OTHER FATIGUE: ICD-10-CM

## 2022-08-22 DIAGNOSIS — T45.1X5A CHEMOTHERAPY INDUCED NEUTROPENIA (HCC): Primary | ICD-10-CM

## 2022-08-22 DIAGNOSIS — F41.9 ANXIETY: ICD-10-CM

## 2022-08-22 DIAGNOSIS — D69.6 THROMBOCYTOPENIA (HCC): ICD-10-CM

## 2022-08-22 DIAGNOSIS — R10.31 RLQ ABDOMINAL PAIN: ICD-10-CM

## 2022-08-22 LAB
ALBUMIN SERPL-MCNC: 3.1 G/DL (ref 3.5–5)
ALBUMIN/GLOB SERPL: 0.9 {RATIO} (ref 1.1–2.2)
ALP SERPL-CCNC: 189 U/L (ref 45–117)
ALT SERPL-CCNC: 39 U/L (ref 12–78)
ANION GAP SERPL CALC-SCNC: 5 MMOL/L (ref 5–15)
AST SERPL-CCNC: 29 U/L (ref 15–37)
BASOPHILS # BLD: 0 K/UL (ref 0–0.1)
BASOPHILS NFR BLD: 0 % (ref 0–1)
BILIRUB SERPL-MCNC: 0.3 MG/DL (ref 0.2–1)
BUN SERPL-MCNC: 10 MG/DL (ref 6–20)
BUN/CREAT SERPL: 10 (ref 12–20)
CALCIUM SERPL-MCNC: 9.3 MG/DL (ref 8.5–10.1)
CEA SERPL-MCNC: 6.8 NG/ML
CHLORIDE SERPL-SCNC: 110 MMOL/L (ref 97–108)
CO2 SERPL-SCNC: 26 MMOL/L (ref 21–32)
CREAT SERPL-MCNC: 0.96 MG/DL (ref 0.7–1.3)
DIFFERENTIAL METHOD BLD: ABNORMAL
EOSINOPHIL # BLD: 0.2 K/UL (ref 0–0.4)
EOSINOPHIL NFR BLD: 3 % (ref 0–7)
ERYTHROCYTE [DISTWIDTH] IN BLOOD BY AUTOMATED COUNT: 23.8 % (ref 11.5–14.5)
GLOBULIN SER CALC-MCNC: 3.5 G/DL (ref 2–4)
GLUCOSE SERPL-MCNC: 120 MG/DL (ref 65–100)
HCT VFR BLD AUTO: 33.4 % (ref 36.6–50.3)
HGB BLD-MCNC: 11 G/DL (ref 12.1–17)
IMM GRANULOCYTES # BLD AUTO: 0.1 K/UL (ref 0–0.04)
IMM GRANULOCYTES NFR BLD AUTO: 1 % (ref 0–0.5)
LYMPHOCYTES # BLD: 1.1 K/UL (ref 0.8–3.5)
LYMPHOCYTES NFR BLD: 21 % (ref 12–49)
MCH RBC QN AUTO: 32.4 PG (ref 26–34)
MCHC RBC AUTO-ENTMCNC: 32.9 G/DL (ref 30–36.5)
MCV RBC AUTO: 98.5 FL (ref 80–99)
MONOCYTES # BLD: 0.5 K/UL (ref 0–1)
MONOCYTES NFR BLD: 9 % (ref 5–13)
NEUTS SEG # BLD: 3.1 K/UL (ref 1.8–8)
NEUTS SEG NFR BLD: 66 % (ref 32–75)
NRBC # BLD: 0 K/UL (ref 0–0.01)
NRBC BLD-RTO: 0 PER 100 WBC
PLATELET # BLD AUTO: 73 K/UL (ref 150–400)
PMV BLD AUTO: 11 FL (ref 8.9–12.9)
POTASSIUM SERPL-SCNC: 3.5 MMOL/L (ref 3.5–5.1)
PROT SERPL-MCNC: 6.6 G/DL (ref 6.4–8.2)
RBC # BLD AUTO: 3.39 M/UL (ref 4.1–5.7)
RBC MORPH BLD: ABNORMAL
SODIUM SERPL-SCNC: 141 MMOL/L (ref 136–145)
WBC # BLD AUTO: 5 K/UL (ref 4.1–11.1)

## 2022-08-22 PROCEDURE — 85025 COMPLETE CBC W/AUTO DIFF WBC: CPT

## 2022-08-22 PROCEDURE — 82378 CARCINOEMBRYONIC ANTIGEN: CPT

## 2022-08-22 PROCEDURE — 80053 COMPREHEN METABOLIC PANEL: CPT

## 2022-08-22 PROCEDURE — 99214 OFFICE O/P EST MOD 30 MIN: CPT | Performed by: INTERNAL MEDICINE

## 2022-08-22 PROCEDURE — 36415 COLL VENOUS BLD VENIPUNCTURE: CPT

## 2022-08-22 PROCEDURE — 99215 OFFICE O/P EST HI 40 MIN: CPT | Performed by: INTERNAL MEDICINE

## 2022-08-22 RX ORDER — EPINEPHRINE 1 MG/ML
0.3 INJECTION, SOLUTION, CONCENTRATE INTRAVENOUS AS NEEDED
Status: CANCELLED | OUTPATIENT
Start: 2022-08-29

## 2022-08-22 RX ORDER — ONDANSETRON 2 MG/ML
8 INJECTION INTRAMUSCULAR; INTRAVENOUS AS NEEDED
Status: CANCELLED | OUTPATIENT
Start: 2022-08-29

## 2022-08-22 RX ORDER — OXYCODONE HYDROCHLORIDE 10 MG/1
10 TABLET ORAL
Qty: 90 TABLET | Refills: 0 | Status: SHIPPED | OUTPATIENT
Start: 2022-08-22 | End: 2022-09-02 | Stop reason: SDUPTHER

## 2022-08-22 RX ORDER — ATROPINE SULFATE 0.4 MG/ML
0.4 INJECTION, SOLUTION ENDOTRACHEAL; INTRAMEDULLARY; INTRAMUSCULAR; INTRAVENOUS; SUBCUTANEOUS
Status: CANCELLED | OUTPATIENT
Start: 2022-08-29

## 2022-08-22 RX ORDER — HYDROCORTISONE SODIUM SUCCINATE 100 MG/2ML
100 INJECTION, POWDER, FOR SOLUTION INTRAMUSCULAR; INTRAVENOUS AS NEEDED
Status: CANCELLED | OUTPATIENT
Start: 2022-08-29

## 2022-08-22 RX ORDER — ALBUTEROL SULFATE 0.83 MG/ML
2.5 SOLUTION RESPIRATORY (INHALATION) AS NEEDED
Status: CANCELLED
Start: 2022-08-29

## 2022-08-22 RX ORDER — ACETAMINOPHEN 325 MG/1
650 TABLET ORAL AS NEEDED
Status: CANCELLED
Start: 2022-08-29

## 2022-08-22 RX ORDER — DIPHENHYDRAMINE HYDROCHLORIDE 50 MG/ML
50 INJECTION, SOLUTION INTRAMUSCULAR; INTRAVENOUS AS NEEDED
Status: CANCELLED
Start: 2022-08-29

## 2022-08-22 RX ORDER — OXYCODONE HCL 10 MG/1
10 TABLET, FILM COATED, EXTENDED RELEASE ORAL EVERY 12 HOURS
Qty: 60 TABLET | Refills: 0 | Status: SHIPPED | OUTPATIENT
Start: 2022-08-22 | End: 2022-09-12 | Stop reason: ALTCHOICE

## 2022-08-22 RX ORDER — DIPHENHYDRAMINE HYDROCHLORIDE 50 MG/ML
25 INJECTION, SOLUTION INTRAMUSCULAR; INTRAVENOUS AS NEEDED
Status: CANCELLED
Start: 2022-08-29

## 2022-08-22 NOTE — PROGRESS NOTES
Palliative Medicine Office Visit  Palliative Medicine Nurse Check In  [phone]    Patient Name: Regina Nunez. YOB: 1975      Date of Office Visit: 8/22/2022    Patient states: \"  \"    From Check In Sheet (scanned in Media):  Has a medical provider talked with you about cardiopulmonary resuscitation (CPR)? [x] Yes   [] No   [] Unable to obtain    Nurse reminder to complete or update ACP FlowSheet:    Is ACP on the Problem List?    [] Yes    [x] No  IF ACP Document is ON FILE; Nurse to place ACP on Problem List     Is there an ACP Note in Chart Review/Note? [x] Yes    [] No   If NO: ALERT PROVIDER       Primary Decision Maker: Dustindc [place] - Girlfriend - [phone]  Advance Care Planning 8/22/2022   Patient's Healthcare Decision Maker is: Legal Next of Kin   Confirm Advance Directive None   Patient Would Like to Complete Advance Directive -   Does the patient have other document types -        Is there anything that we should know about you as a person in order to provide you the best care possible? Have you been to the ER, urgent care clinic since your last visit? [] Yes   [x] No   [] Unable to obtain    Have you been hospitalized since your last visit? [] Yes   [x] No   [] Unable to obtain    Have you seen or consulted any other health care providers outside of the [address] [place] [address] since your last visit? [] Yes   [x] No   [] Unable to obtain    Functional status (describe):         Last BM: ileostomy bag     accessed (date):      Bottle review (for opioid pain medication):  Medication 1: Oxycodone   Date filled:   Directions:   # filled:   # left: 5 tablets  # pills taking per day:  Last dose taken:    Medication 2: Fentanyl patch   Date filled:   Directions:   # filled:   # left: 2  # pills taking per day:  Last dose taken:    Medication 3:   Date filled:   Directions:   # filled:   # left:   # pills taking per day:  Last dose taken:    Medication 4:   Date filled:   Directions:   # filled:   # left:   # pills taking per day:  Last dose taken:

## 2022-08-22 NOTE — PROGRESS NOTES
1. Have you been to the ER, urgent care clinic since your last visit? Hospitalized since your last visit? No    2. Have you seen or consulted any other health care providers outside of the [address] [place] [address] since your last visit? Include any pap smears or colon screening. No        Chief Complaint   Patient presents with    Follow-up     Follow up for colon cancer.

## 2022-08-22 NOTE — PROGRESS NOTES
[facility] Progress Note    Date: 2022    Name: Carly Holbrook. MRN: [id number]         : 1975    Mr. Hugo Beaver Arrived ambulatory and in no distress for C7D1 of Folfoxfiri Regimen HELD. Assessment was completed, no acute issues at this time, no new complaints voiced. Right chest wall port accessed without difficulty, labs drawn & sent for processing. Chemotherapy Flowsheet 2022   Cycle C7D1   Date 2022   Drug / Regimen Folfoxfiri   Post Hydration -   Pre Meds -   Notes -     Patient proceed to appointment with Dr. Lisa Saravia. Mr. Curry Greene vitals were reviewed. Visit Vitals  /83   Pulse 75   Temp 97.3 °F (36.3 °C)   Resp 18   Ht 5' 7\" (1.702 m)   Wt 61.1 kg (134 lb 12.8 oz)   SpO2 97%   BMI 21.11 kg/m²       Lab results were obtained and reviewed. Recent Results (from the past 12 hour(s))   CBC WITH AUTOMATED DIFF    Collection Time: 22  7:47 AM   Result Value Ref Range    WBC 5.0 4.1 - 11.1 K/uL    RBC 3.39 (L) 4.10 - 5.70 M/uL    HGB 11.0 (L) 12.1 - 17.0 g/dL    HCT 33.4 (L) 36.6 - 50.3 %    MCV 98.5 80.0 - 99.0 FL    MCH 32.4 26.0 - 34.0 PG    MCHC 32.9 30.0 - 36.5 g/dL    RDW 23.8 (H) 11.5 - 14.5 %    PLATELET 73 (L) 443 - 400 K/uL    MPV 11.0 8.9 - 12.9 FL    NRBC 0.0 0  WBC    ABSOLUTE NRBC 0.00 0.00 - 0.01 K/uL    NEUTROPHILS PENDING %    LYMPHOCYTES PENDING %    MONOCYTES PENDING %    EOSINOPHILS PENDING %    BASOPHILS PENDING %    IMMATURE GRANULOCYTES PENDING %    ABS. NEUTROPHILS PENDING K/UL    ABS. LYMPHOCYTES PENDING K/UL    ABS. MONOCYTES PENDING K/UL    ABS. EOSINOPHILS PENDING K/UL    ABS. BASOPHILS PENDING K/UL    ABS. IMM. GRANS. PENDING K/UL    DF PENDING        Treatment HELD due to platelet count. Pharmacy notified. Retry in 1 week. Mr. Hugo Beaver was discharged from [place] [address] in stable condition. Port de-accessed, flushed & heparinized per protocol. He is to return on  for his next appointment.     Marylin Carrel, ADA  August 22, 2022

## 2022-08-22 NOTE — TELEPHONE ENCOUNTER
[address] at [facility]  [phone]    08/22/22 9:11 AM - [facility] patient's Signatera testing. Confirmation [id number].

## 2022-08-22 NOTE — PATIENT INSTRUCTIONS
Dear Tejal Angel. ,    It was a pleasure seeing you today in [facility]. We will see you again in 6 weeks for an office visit to coordinate with your infusion. If labs or imaging tests have been ordered for you today, please call the office  at [phone] 48 hours after completion to obtain the results. Your described symptoms were: Fatigue: 5 Drowsiness: 5   Depression: 1 Pain: 5   Anxiety: 8 Nausea: 2   Anorexia: 1 Dyspnea: 1   Best Well-Bein Constipation: Yes   Other Problem (Comment): 0       This is the plan we talked about:    1. Abdominal pain  -Continue oxycodone 10-mg every 4 hours as needed  -Discontinue fentanyl patch as this was ineffective in helping with your pain  -Today I again prescribed extended-release oxycodone 10-mg every 12 hours  -Extended-release oxycodone was more effective overall in managing your pain, especially your early morning pain  -Today you provided a urine sample for tox screen in accordance with our practice's safer opioid prescribing guidelines    2. Right arm pain  -The etiology (cause) of this pain is unclear at this point  -Dr. Ninfa Thompson has referred you for a port study  -Please follow-up with making an appointment with your cardiologist    3. Depression/anxiety  -Continue Lexapro 20-mg daily  -I'm avoiding benzodiazepines due to synergistic effect with opioids    4. Poor appetite/weight loss  -Continue eating your one good meal a day  -Try to eat smaller amounts of food throughout the day (4 to 5 times) to keep up with your nutritional needs  -You've been maintaining your weight in the 129-132 pound range     5. Fatigue  -This is chronic and unchanged   -You're sleeping about 5 hours at night    6. Nausea  -Continue ondansetron 8-mg every 8 hours as needed  -Continue prochlorperazine 10-mg every 6 hours as needed    7.  Colon mass  -You're receiving chemotherapy under the care of Dr. Ninfa Thompson  -Your recent scans on 22 showed response to your therapy    This is what you have shared with us about Advance Care Planning:      Primary Decision Maker: Jeremy Mercado - Girlfriend - [phone]  Advance Care Planning 8/22/2022   Patient's Healthcare Decision Maker is: Legal Next of Kin   Confirm Advance Directive None   Patient Would Like to Complete Advance Directive -   Does the patient have other document types -           The Palliative Medicine Team is here to support you and your family.        Sincerely,      Tano Marr MD and the Palliative Medicine Team

## 2022-08-22 NOTE — TELEPHONE ENCOUNTER
[address] at [facility]  [phone]      01/07/19 2:36 PM Received fax from [employer] stating patient is requesting a cheaper alternative to atorvastatin. Pharmacy suggesting prescribing simvastatin instead which would be a $30 copay versus $48. Will defer to Dr. Timmy Turner and Daniel Laurent. Pt sleeping, breathing unlabored. VSS. All personal belongings were removed and placed with security. Pt remains with watch to left wrist.

## 2022-08-22 NOTE — PROGRESS NOTES
[id number] [facility] On[facility] at [facility]  [phone]    Hematology / Oncology Established Visit    Reason for Visit:   Ruperto Umana is a 42 y.o. male who is seen for urgent follow up of colon cancer, h/o lymphoma. Hematology Oncology Treatment History:     Diagnosis #1: Follicular lymphoma  Stage: IV  Pathology:   11/13/18 right inguinal LN excision: Follicular lymphoma, high-grade (grade 3a of 3). Prior Treatment:   1. Obinutuzumab-CHOP. Obinutuzumab: 1000 mg weekly on days 1, 8, 15 for cycle 1, then 1000 mg on day 1 q21 days for cycles 2-6, then monotherapy 1000 mg every 21 days for cycle 7, 8 with Cyclophosphamide 750mg/m2, Doxorubicin 50mg/m2, Vincristine 1.4mg/m2 on day 1 and Prednisone 100mg on Days 1-5, every 21 days for a total of 2 cycles completed late 1/2019. Regimen discontinued due to STEMI. 2. Obinutuzumab + Bendamustine: 1000 mg Obinutuzumab on day 1 + Bendamustine 90mg/m2 on days 1-2 on a 28-day cycle x 4 cycles, completed 5/2019  Current Treatment: Surveillance      Diagnosis #2: Colon cancer:  Stage: IV  Pathology:  Ascending colon; biopsy: poorly differentiated carcinoma  Comment: No dysplasia is identified. Immunohistochemical stains performed on block 1A, show the tumor positive for Cam5.2 and shows patchy positivity for CDX2 with a Ki-67 index of -90%; the tumor is focally positive for CK5/6 and GATA3; negative for p63, Pax8, CK7, CK20, panmelanoma, synaptophysin and chromogranin. The immunophenotypic features are nonspecific but argues somewhat against the following carcinoma: urothelial, neuroendocrine and breast. The immunophenotypic features also argues against melanoma and somewhat against classic colorectal carcinoma. Additional immunohistochemical stains to exclude the possibility of prostate and hepatocellular carcinoma have been   ordered; the results will be reported as an addendum.   MLH1/MSH2/MSH6/PMS2 all intact with no loss of nuclear expression of MMR proteins - no MSI   Addendum: The addendum is issued to report the results of additional immunohistochemical stains in an attempt to ascertain the primary site of the carcinoma. The diagnosis is unchanged. Hepar and glypican 3 immunohistochemical stains are neg, arguing against hepatocellular carcinoma. PSA stain is negative, arguing against prostatic primary. Imaging studies to eval for poss primary sites maybe useful. In the absence of carcinoma/tumor at any other sites, this may represent an undifferentiated carcinoma of the colon. Oncogenomics (molecular) studies: POLE< ARID1A, YVONNE, ATR, BRCA2, CHECK1, FANCI, NF1, PIK3CA, PTCH1, PTEN, RAD50, RAD51, RB1, SMARCA4, STK11    Prior Treatment:   1. Loop ileostomy 2/19/22  2. Diagnotic laparoscopy with peritoneal biopsy, 4/27/22    Current Treatment: FOLFOXIRI every 2 weeks until disease progression or toxicity, 5/16/22 - current      Oncologic History:  Was in his usual state of health in early November 2018 when he developed low back pain and presented to the ER. Work-up at outside hospital revealed a retroperitoneal mass seen on CT imaging, and he was transferred to [facility] for further work-up. CT there showed a large retroperitoneal mass encircling the aorta with invasion of the left renal hilum and left adrenal gland. There were bilateral inguinal lymph nodes and moderate left hydronephrosis. He was evaluated while at [facility] and was noted to have palpable nodes in his groin for approximately the past 1 month. He underwent excisional LN biopsy of right inguinal LN, which revealed follicular lymphoma. At time of diagnosis, he had no cardiac disease aside from HTN, hyperlipidemia. However, he did have an NSTEMI after cycle 2 of O-CHOP. Was likely unrelated to chemotherapy, but opted to switch treatment to Obinutuzumab-Bendamustine. He completed treatment in 5/2019 and had a CR based on PET.     History of Present Illness:   Mr. Hodan Young is a 42 y.o. male with prior h/o follicular lymphoma is seen for follow up of colon cancer and C7 of treatment. Reports one episode of nausea. Managing with antiemetics. Eating and hydrating well. Reports with last cycle he developed a sharp shooting pain right eye, occurred 2 days later. No light sensitivity or blurred vision. Lasted for a few seconds. Soft stool in colostomy bag. Following with palliative, recently added fentanyl patch in addition to oxycodone. No neuropathy, CP, SOB. PMHx: Lymphoma in 2018, CAD, HTN, Hyperlipidemia, Anxiety, chronic low back pain  PSurgHx: None aside from cardiac stent placement and port placement  SHx: Smokes 1/2ppd x past 20 yrs. Works part time as a cook. Has 2 children. FHx: Father had leukemia, passed away from pancreatic cancer. Review of Systems: A complete review of systems was obtained, negative except as described above. Physical Exam:     Visit Vitals  /83   Pulse 75   Temp 97.3 °F (36.3 °C)   Resp 18   Ht 5' 7\" (1.702 m)   Wt 134 lb (60.8 kg)   SpO2 97%   BMI 20.99 kg/m²     ECOG PS: 0  General: no distress  Eyes: anicteric sclerae  HENT: oropharynx clear  Neck: supple  Lymphatic: no cervical, supraclavicular adenopathy  Respiratory: CTAB, normal respiratory effort  CV: no peripheral edema, port upper chest   GI: soft, nontender, nondistended, no masses;  colostomy in place. Skin: no rashes; no ecchymoses; no petechiae      Results:     Lab Results   Component Value Date/Time    WBC 5.0 08/22/2022 07:47 AM    HGB 11.0 (L) 08/22/2022 07:47 AM    HCT 33.4 (L) 08/22/2022 07:47 AM    PLATELET 73 (L) 47/20/2372 07:47 AM    MCV 98.5 08/22/2022 07:47 AM    ABS.  NEUTROPHILS PENDING 08/22/2022 07:47 AM    Hemoglobin (POC) 15.0 06/05/2009 02:13 PM    Hematocrit (POC) 39 02/14/2019 01:24 PM     Lab Results   Component Value Date/Time    Sodium 143 08/08/2022 07:58 AM    Potassium 3.5 08/08/2022 07:58 AM    Chloride 111 (H) 08/08/2022 07:58 AM    CO2 26 2022 07:58 AM    Glucose 96 2022 07:58 AM    BUN 6 2022 07:58 AM    Creatinine 0.87 2022 07:58 AM    GFR est AA >60 2022 07:58 AM    GFR est non-AA >60 2022 07:58 AM    Calcium 9.1 2022 07:58 AM    Sodium (POC) 136 2019 01:24 PM    Potassium (POC) 3.9 2019 01:24 PM    Chloride (POC) 102 2019 01:24 PM    Glucose (POC) 249 (H) 02/15/2019 10:21 PM    BUN (POC) 14 2019 01:24 PM    Creatinine (POC) 0.9 2019 01:24 PM    Calcium, ionized (POC) 1.24 2019 01:24 PM     Lab Results   Component Value Date/Time    Bilirubin, total 0.4 2022 07:58 AM    ALT (SGPT) 36 2022 07:58 AM    Alk. phosphatase 139 (H) 2022 07:58 AM    Protein, total 6.3 (L) 2022 07:58 AM    Albumin 2.9 (L) 2022 07:58 AM    Globulin 3.4 2022 07:58 AM     Lab Results   Component Value Date/Time    Iron 18 (L) 2022 11:13 AM    TIBC 173 (L) 2022 11:13 AM    Iron % saturation 10 (L) 2022 11:13 AM    Ferritin 426 (H) 2022 11:13 AM       No results found for: B12LT, FOL, RBCF  Lab Results   Component Value Date/Time    TSH 1.53 2016 04:40 AM       18:       Labs at [facility]:  22: CA 19-9 = 390  22: CEA = 12.3  22: CEA = 19.2  22: CEA = 17.9   22: CEA = 6.0    [facility] MRD:  22: 295.97 MTM/mL  22: 2.98    Imagin/9/18 Abd/pelvis CT: IMPRESSION:  1. Interval development of a large retroperitoneal mass encircling the aorta with invasion of the left renal hilum and left adrenal gland. Several adjacent lymph nodes are seen extending into the peritoneum and underneath the  diaphragmatic natalie. This most likely represents lymphoma. 2. Several new bilateral enlarged inguinal lymph nodes also likely representing lymphoma. 3. Moderate left hydronephrosis with a delayed renal nephrogram related to decreased renal function.  This is related to the invasion of the renal hilum.    11/11/18 Chest CT: IMPRESSION:  Trace left pleural effusion. Bilateral lower lobe atelectasis. Large  retroperitoneal mass lesion again demonstrated. PET 12/18/18:  FINDINGS:  HEAD/NECK: Right palatine tonsil intense hypermetabolism, max SUV 18. Multilevel  bilateral cervical adenopathy, with max SUV 12 in a left supraclavicular node. Cerebral evaluation is limited by normal intense activity. CHEST: Solitary hypermetabolic left axillary node, max SUV 11. ABDOMEN/PELVIS: Bulky retroperitoneal mass max SUV 27, with several additional  small active abdomino-pelvic nodes. Bilateral inguinal nodes with max SUV 12 on  the left. SKELETON: No foci of abnormal hypermetabolism in the axial and visualized  appendicular skeleton. IMPRESSION:   1. Right palatine tonsil tumor involvement (Deauville 5). 2. Bilateral cervical delaney involvement (Deauville 5). 3. Left axillary node involvement (Deauville 5). 4. Bulky retroperitoneal lymphoma mass and additional smaller hypermetabolic  abdomino-pelvic nodes (Deauville 5). 5. Bilateral inguinal delaney involvement (Deauville 5). Deauville Five Point Scale  1. No uptake or no residual uptake (when used interim)  2. Slight uptake, but below blood pool (mediastinum)  3. Uptake above mediastinal, but below/equal to uptake in the liver  4. Uptake slightly to moderately higher than liver  5. Markedly increased uptake or any new lesion (on response evaluation)  Each FDG-avid (or previously FDG avid) lesion is rated independently. Reference values:  Mediastinal blood pool: 2.1 SUV  Liver (background): 2.2 SUV    PET/CT 2/05/19:   IMPRESSION:  1. No Foci of Abnormal Hypermetabolism (Deauville 1). 2. Resolved activity in the right palatine tonsil, bilateral cervical nodes,left axillary node, retroperitoneal/abdominal pelvic adenopathy, bilateral inguinal nodes. Echo 2/14/19:  Normal cavity size, wall thickness and systolic function (ejection fraction normal).  The muscle mass is normal. The cavity shape is normal. The estimated ejection fraction is 41 - 45%. Abnormal wall motion as described on the wall scoring diagram below. End-systolic volume is normal. Normal left ventricular strain. There is mild (grade 1) left ventricular diastolic dysfunction. Normal left ventricular diastolic pressure. End-diastolic volume is normal.    LE arterial duplex 2/22/19:  There is evidence of left groin pseudoaneurysm noted arising from distal common femoral artery, pseudo lobe measures 2.32cm x 2.58cm and pseudo neck length measuring 0.63cm. There is no evidence of hemodynamically significant left lower extremity arterial obstruction. JACLYN is 1.03 on the right and 1.02 on the left. LE arterial duplex s/p Thrombin Injection to Pseudoaneurysm 2/26/19:  Successful thrombin injection procedure of the left groin with no further flow seen. No evidence of hemodnyamically significant obstruction in the left lower extremity. Left lower extremity arterial duplex performed. Confirmed pseudoaneurysm in left groin with small neck. Following thrombin injection, no further flow seen in the pseudoaneurysm. The left common femoral, profunda femoral, femoral, popliteal, posterior tibial and anterior arteries were imaged. Mainly triphasic flow was seen with no evidence of significantly elevated velocities. Repeat LE arterial duplex 2/27/19:  Continued thrombosed left groin pseudoaneurysm following thrombin injection on 02/26/2019. No flow or color fill is identified. The hematoma measures approximately 2.1 x 2.9 cm in diameter. The common femoral, deep femoral, femoral, and popliteal arteries are patent with mainly tri-phasic flow and no significant hemodynamically obstruction is noted. Stress 5/31/19:  Normal stress myocardial perfusion without ischemia or infact at 84% MPHR. Normal LV function. LVEF 60%. No EKG changes of ischemia at peak exercise. Normal functional capacity.     PET 6/03/19: IMPRESSION: No Foci of Abnormal Hypermetabolism (Deauville 1). -MRI lumbar spine 12/18/19:  Mild disc degenerative change at L3-4 and L4-5. Mild canal stenosis at L3-4 and mild left foraminal stenosis at L4-5. Other less severe degenerative findings are as described above. Continued diminished size of retroperitoneal mass-adenopathy,  with diminished soft tissue density at the left renal hilum. -CT chest/abdomen 12/16/19:  Findings are consistent with interval response to therapy    CT c/a/p 5/28/20: IMPRESSION:  Further contraction of the retroperitoneal mantle and chris mesentery,  compatible with treatment response  Stable left hilar soft tissue mass  Slight increased splaying of the duodenum and proximal jejunum is the result, without obstruction  No evidence for recurrence of lymphoma in the chest, abdomen, or pelvis    CT c/a/p 2/13/22:  FINDINGS:   LOWER THORAX: Dependent atelectasis with otherwise clear lungs. The visualized  heart is normal in size without pericardial effusion. LIVER: No mass. BILIARY TREE: Gallbladder is unremarkable. CBD is not dilated. SPLEEN: Unremarkable. PANCREAS: No mass or ductal dilatation. ADRENALS: Unremarkable. KIDNEYS: Atrophic left kidney with mild left hydronephrosis. Right kidney is  unremarkable with no stone, enhancing mass, or other renal abnormality. STOMACH: Unremarkable. SMALL BOWEL: No dilatation or wall thickening. COLON: Circumferential wall thickening at the hepatic flexure with luminal  narrowing, adjacent mesenteric stranding, and fluid with upstream retention in  the cecum and fecalization of distal ileal contents. No dilation or other wall  thickening. APPENDIX: Unremarkable. PERITONEUM: Moderate free fluid with no pneumoperitoneum. RETROPERITONEUM: Soft tissue stranding around the celiac and SMA and associated aorta with no discrete mass or adenopathy. Aorta is normal in size without aneurysm or dissection.   REPRODUCTIVE ORGANS: Prostate and seminal vesicles appear unremarkable. URINARY BLADDER: No mass or calculus. BONES: No destructive bone lesion. ABDOMINAL WALL: No mass or hernia. ADDITIONAL COMMENTS: N/A  IMPRESSION  1. Annular mass lesion of the right colon with upstream fecal retention  concerning for primary colon neoplasm versus less likely lymphoma. 2. Soft tissue stranding around celiac axis, SMA, and abdominal aorta may  reflect infiltrative lymphoma. 3. Left renal atrophy with left hydronephrosis likely reflecting chronic  proximal ureteral obstruction. 4. Incidentals as above. 2/24/22 CT abd/pelvis at [facility]:  FINDINGS: An enteric tube with sidehole beyond the level of the lower esophageal sphincter is seen looping on itself in the proximal stomach before terminating in the mid stomach. Status post right lower quadrant diverting ileostomy for an obstructing colonic mass. Multiple loops of gas dilated small bowel remain, with a maximal diameter of 5.4 cm whereas previously measured 3.6 cm. Dilute contrast is seen within the lateral left abdominal dilated small bowel. Moderate stool burden and bowel gas opacifies the cecum, measuring 7.3 cm in diameter. No definite supine evidence of pneumoperitoneum. Lung bases: Limited evaluation of the lung bases demonstrates a cardiac silhouette within normal limits for size. A small bore central venous catheter is seen terminating in the right atrium. No focal consolidation or pleural effusion. 2/24/22 CT abd/pelvis [facility]:  IMPRESSION:  1. Status post right lower quadrant loop ileostomy. Mild diffuse dilation of small bowel proximal to the ostomy in the lower abdomen and upper pelvis concerning for at least mild to moderate partial bowel obstruction. Relatively decompressed loop ileostomy. 2. Mildly complex pelvic fluid collection in the rectovesical space, decreased in size from prior.   3. Redemonstrated ascending colon mass and findings suspicious for regional metastatic disease. 4. Redemonstrated soft tissue in the retroperitoneum with prominent lymph nodes, similar to prior examination. Differential would include metastasis, retroperitoneal fibrosis. 5. Mild atherosclerosis. 6. Subcentimeter right renal hypodensity, too small to characterize. 7. Severe compression of the left renal vein, similar to prior examination. 8. Additional findings as described above. Bones: No acute osseous abnormality. 2/24/22 CT chest [facility]:  IMPRESSION:  FINAL report. 1. No evidence of metastatic disease to the chest.  2. No enlarged lymph nodes. 3. Increasing volume loss in the lung bases which may be due to atelectasis. 4. CT abdomen and pelvis reported separately. 2/25/22 Colonoscopy at [facility]:  Impression:            - Preparation of the colon was inadequate. - Stool in the entire examined colon. - Likely malignant completely obstructing tumor at the                         hepatic flexure. Biopsied.                        - Malignant-appearing tumor in the colon. Complications:         No immediate complications. 7/19/22 CT ch/abd/pelv:  IMPRESSION  Response to treatment characterized by decrease in size of the apical core  lesion in the proximal transverse colon and decreased mucosal colic  lymphadenopathy. Persistent mesenteric lymphadenopathy and retroperitoneal/mesenteric soft tissue  which may relate to the patient's history of lymphoma. Chronic left renal atrophy with chronic left hydronephrosis. RECIST:  Target lesions:  Transverse colon mass, series 2, image 75, 27 x 26 mm, previously 49 x 45 mm. Nontarget lesions:  Transverse mesocolic lymph nodes, decreased. Assessment & Plan:   Jossue Lawrence is a 42 y.o. male comes in for evaluation and management of lymphoma.     1. Undifferentiated carcinoma of transverse colon, metastatic, KRAS/NRAS status unclear:  S/p diverting ileostomy due to large bowel obstruction. Colonoscopy with biopsy on 2/25/22. No obvious metastatic disease on CT imaging, but did have obvious metastatic disease to peritoneum during laparoscopy with Dr. Aubrey Eisenmenger. I recommended FOLFOXIRI every 2 weeks. Will not give Bevacizumab given h/o MRI and tobacco use. [facility] suggests: BRCA2 and YVONNE mutations support use of Olaparib or similar PARP inhibitor in future. They also suggest testing for TMB, MSI, PDL1 to determine utility of checkpoint inhibitors. CT after 4 cycles of treatment shows response to treatment with decrease size in transverse colon mass on 7/19/22. Supportive medications: zofran, compazine, dexamethasone, EMLA topical  -- Hold C7 of FOLFOXIRI without neulasta support. Consider adding Ziextendo if persistent neutropenia. -- Repeat Signater[facility] testing as scheduled, next due today, 8/22/22. Looking into adding [employer] or alternate NGS testing to determine TMB, MSI, PDL1, KRAS/NRAS/BRAF. -- Check CEA every 8 weeks  -- Consider repeat colonoscopy in 4-6 months given incomplete colonoscopy. -- Repeat CT of ch/abd/pelvis after C8.   -- Follow up in 1 week for retry C7 FOLFOXIRI, MD/NP visit. 2. H/o Follicular lymphoma:   Grade 3a with with bulky disease encircling the aorta, causing pain. Bone marrow negative for lymphoma, but was hypercellular. BR better than RCHOP, but based on GALLIUM study, Obinutuzumab-based induction and maintenance prolongs PFS over that seen with rituximab-based therapy. Therefore, pt started on O-CHOP regimen. He received 2 cycles with CR and was then switched to BR x 4 cycles given STEMI. Completed treatment 5/2019. PET completed 6/3/19 showed CR. CT 5/28/20 negative for disease. No extra surveillance needed at this time given metastatic colon cancer with frequent imaging.      3. Chronic lumbar back pain / anxiety / RLQ / insomnia:   Left lower back pain is chronic/stable and RLQ is likely related to neoplasm/colostomy - currently managing pain on Oxycontin 10mg prn, fentanyl and Lexapro daily. Signed pain contract on 12/28/18 and following with Dr. Tyrone Reid. Trazodone, melatonin not helping insomnia. Reviewed sleep hygiene. 4. CAD / HTN:   h/o STEMI 2/14/29 with TOM placed to proximal LAD. Following with Dr. Tali Turcios and remains on dual antiplatelet therapy, high dose Lipitor, Metoprolol, ACEI. Received only 2 doses of cardiotoxic chemo, Doxorubicin in early 1/2019. Is overdue for follow up with Dr. Tali Turcios. 5. Tobacco abuse:   Discussed benefits of quitting smoking again. Previously discussed replacing hand-to-mouth habit with another item such as Twizzlers or SlimJims. 6. BRCA2 mutation:  Will discuss with patient in future. He would benefit from genetic counseling for himself and his family. 7. Weight-loss/ fatigue:   Improving. Encouraged supplementing with 2 boost/ensures daily in addition to meals and 60 oz water daily and advised scheduled antiemetics on days 4-6.     8. H/o chemotherapy induced neutropenia:  Neulasta added with C3 forward. Advised neutropenic precautions. Held with C5 due to insurance authorization. Consider adding Ziextendo if persistent neutropenia. Goals of care: Disease is not curable, but is treatable to improve quality and duration of life. I personally saw and evaluated the patient and performed the key components of medical decision making. The history, physical exam, and documentation were performed by Channing Aguilar NP. I reviewed and verified the above documentation and modified it as needed. Will hold treatment today given thrombocytopenia. If this becomes a recurrent issue, will dose-reduce 1 of the chemotherapy drugs. Likely Irinotecan. Unclear why pain med requirement increasing when imaging last month showed reduction in size of mass. Pt may have built up some tolerance.      Signed By: Brandi Reynaga MD

## 2022-08-24 ENCOUNTER — HOSPITAL ENCOUNTER (OUTPATIENT)
Dept: INFUSION THERAPY | Age: 47
End: 2022-08-24

## 2022-08-24 NOTE — PROGRESS NOTES
[id number] [facility] at [address] [place] [address]  [phone]    Hematology / Oncology Established Visit    Reason for Visit:   Carly Latham is a 42 y.o. male who is seen for urgent follow up of colon cancer, h/o lymphoma. Hematology Oncology Treatment History:     Diagnosis #1: Follicular lymphoma  Stage: IV  Pathology:   11/13/18 right inguinal LN excision: Follicular lymphoma, high-grade (grade 3a of 3). Prior Treatment:   1. Obinutuzumab-CHOP. Obinutuzumab: 1000 mg weekly on days 1, 8, 15 for cycle 1, then 1000 mg on day 1 q21 days for cycles 2-6, then monotherapy 1000 mg every 21 days for cycle 7, 8 with Cyclophosphamide 750mg/m2, Doxorubicin 50mg/m2, Vincristine 1.4mg/m2 on day 1 and Prednisone 100mg on Days 1-5, every 21 days for a total of 2 cycles completed late 1/2019. Regimen discontinued due to STEMI. 2. Obinutuzumab + Bendamustine: 1000 mg Obinutuzumab on day 1 + Bendamustine 90mg/m2 on days 1-2 on a 28-day cycle x 4 cycles, completed 5/2019  Current Treatment: Surveillance      Diagnosis #2: Colon cancer:  Stage: IV  Pathology:  Ascending colon; biopsy: poorly differentiated carcinoma  Comment: No dysplasia is identified. Immunohistochemical stains performed on block 1A, show the tumor positive for Cam5.2 and shows patchy positivity for CDX2 with a Ki-67 index of -90%; the tumor is focally positive for CK5/6 and GATA3; negative for p63, Pax8, CK7, CK20, panmelanoma, synaptophysin and chromogranin. The immunophenotypic features are nonspecific but argues somewhat against the following carcinoma: urothelial, neuroendocrine and breast. The immunophenotypic features also argues against melanoma and somewhat against classic colorectal carcinoma. Additional immunohistochemical stains to exclude the possibility of prostate and hepatocellular carcinoma have been   ordered; the results will be reported as an addendum.   MLH1/MSH2/MSH6/PMS2 all intact with no loss of nuclear expression of MMR proteins - no MSI   Addendum: The addendum is issued to report the results of additional immunohistochemical stains in an attempt to ascertain the primary site of the carcinoma. The diagnosis is unchanged. Hepar and glypican 3 immunohistochemical stains are neg, arguing against hepatocellular carcinoma. PSA stain is negative, arguing against prostatic primary. Imaging studies to eval for poss primary sites maybe useful. In the absence of carcinoma/tumor at any other sites, this may represent an undifferentiated carcinoma of the colon. Oncogenomics (molecular) studies: POLE< ARID1A, YVONNE, ATR, BRCA2, CHECK1, FANCI, NF1, PIK3CA, PTCH1, PTEN, RAD50, RAD51, RB1, SMARCA4, STK11    Prior Treatment:   1. Loop ileostomy 2/19/22  2. Diagnotic laparoscopy with peritoneal biopsy, 4/27/22    Current Treatment: FOLFOXIRI every 2 weeks until disease progression or toxicity, 5/16/22 - current      Oncologic History:  Was in his usual state of health in early November 2018 when he developed low back pain and presented to the ER. Work-up at outside hospital revealed a retroperitoneal mass seen on CT imaging, and he was transferred to [facility] for further work-up. CT there showed a large retroperitoneal mass encircling the aorta with invasion of the left renal hilum and left adrenal gland. There were bilateral inguinal lymph nodes and moderate left hydronephrosis. He was evaluated while at [facility] and was noted to have palpable nodes in his groin for approximately the past 1 month. He underwent excisional LN biopsy of right inguinal LN, which revealed follicular lymphoma. At time of diagnosis, he had no cardiac disease aside from HTN, hyperlipidemia. However, he did have an NSTEMI after cycle 2 of O-CHOP. Was likely unrelated to chemotherapy, but opted to switch treatment to Obinutuzumab-Bendamustine. He completed treatment in 5/2019 and had a CR based on PET.     History of Present Illness:   Mr. Hannah London is a 42 y.o. male with prior h/o follicular lymphoma is seen for follow up of colon cancer and C7 of treatment. Managing nausea with antiemetics. Eating and hydrating well. Reports with last cycle he developed a sharp shooting pain right eye, occurred 2 days later. No light sensitivity or blurred vision. Lasted for a few seconds. No recurrence this past week. Soft stool in colostomy bag. No neuropathy, CP, SOB, fevers, chills. PMHx: Lymphoma in 2018, CAD, HTN, Hyperlipidemia, Anxiety, chronic low back pain  PSurgHx: None aside from cardiac stent placement and port placement  SHx: Smokes 1/2ppd x past 20 yrs. Works part time as a cook. Has 2 children. FHx: Father had leukemia, passed away from pancreatic cancer. Review of Systems: A complete review of systems was obtained, negative except as described above. Physical Exam:     Visit Vitals  /72   Pulse 76   Temp 98.6 °F (37 °C)   Resp 16   Ht 5' 7\" (1.702 m)   Wt 138 lb (62.6 kg)   SpO2 97%   BMI 21.61 kg/m²     ECOG PS: 0  General: no distress  Eyes: anicteric sclerae  HENT: oropharynx clear  Neck: supple  Lymphatic: no cervical, supraclavicular adenopathy  Respiratory: CTAB, normal respiratory effort  CV: no peripheral edema, port upper chest   GI: soft, nontender, nondistended, no masses;  colostomy in place. Skin: no rashes; no ecchymoses; no petechiae      Results:     Lab Results   Component Value Date/Time    WBC 4.5 08/29/2022 08:29 AM    HGB 11.6 (L) 08/29/2022 08:29 AM    HCT 36.5 (L) 08/29/2022 08:29 AM    PLATELET 466 08/04/9495 08:29 AM    .8 (H) 08/29/2022 08:29 AM    ABS.  NEUTROPHILS 2.4 08/29/2022 08:29 AM    Hemoglobin (POC) 15.0 06/05/2009 02:13 PM    Hematocrit (POC) 39 02/14/2019 01:24 PM     Lab Results   Component Value Date/Time    Sodium 141 08/29/2022 08:29 AM    Potassium 3.2 (L) 08/29/2022 08:29 AM    Chloride 109 (H) 08/29/2022 08:29 AM    CO2 27 08/29/2022 08:29 AM    Glucose 138 (H) 08/29/2022 08:29 AM BUN 7 2022 08:29 AM    Creatinine 0.87 2022 08:29 AM    GFR est AA >60 2022 08:29 AM    GFR est non-AA >60 2022 08:29 AM    Calcium 9.4 2022 08:29 AM    Sodium (POC) 136 2019 01:24 PM    Potassium (POC) 3.9 2019 01:24 PM    Chloride (POC) 102 2019 01:24 PM    Glucose (POC) 249 (H) 02/15/2019 10:21 PM    BUN (POC) 14 2019 01:24 PM    Creatinine (POC) 0.9 2019 01:24 PM    Calcium, ionized (POC) 1.24 2019 01:24 PM     Lab Results   Component Value Date/Time    Bilirubin, total 0.4 2022 08:29 AM    ALT (SGPT) 36 2022 08:29 AM    Alk. phosphatase 203 (H) 2022 08:29 AM    Protein, total 6.7 2022 08:29 AM    Albumin 3.2 (L) 2022 08:29 AM    Globulin 3.5 2022 08:29 AM     Lab Results   Component Value Date/Time    Iron 18 (L) 2022 11:13 AM    TIBC 173 (L) 2022 11:13 AM    Iron % saturation 10 (L) 2022 11:13 AM    Ferritin 426 (H) 2022 11:13 AM       No results found for: B12LT, FOL, RBCF  Lab Results   Component Value Date/Time    TSH 1.53 2016 04:40 AM       18:       Labs at [facility]:  22: CA 19-9 = 390  22: CEA = 12.3  22: CEA = 19.2  22: CEA = 17.9   22: CEA = 6.0    Signatera MRD:  22: 295.97 MTM/mL  22: 2.98    Imagin/9/18 Abd/pelvis CT: IMPRESSION:  1. Interval development of a large retroperitoneal mass encircling the aorta with invasion of the left renal hilum and left adrenal gland. Several adjacent lymph nodes are seen extending into the peritoneum and underneath the  diaphragmatic natalie. This most likely represents lymphoma. 2. Several new bilateral enlarged inguinal lymph nodes also likely representing lymphoma. 3. Moderate left hydronephrosis with a delayed renal nephrogram related to decreased renal function. This is related to the invasion of the renal hilum. 18 Chest CT: IMPRESSION:  Trace left pleural effusion. Bilateral lower lobe atelectasis. Large  retroperitoneal mass lesion again demonstrated. PET 12/18/18:  FINDINGS:  HEAD/NECK: Right palatine tonsil intense hypermetabolism, max SUV 18. Multilevel  bilateral cervical adenopathy, with max SUV 12 in a left supraclavicular node. Cerebral evaluation is limited by normal intense activity. CHEST: Solitary hypermetabolic left axillary node, max SUV 11. ABDOMEN/PELVIS: Bulky retroperitoneal mass max SUV 27, with several additional  small active abdomino-pelvic nodes. Bilateral inguinal nodes with max SUV 12 on  the left. SKELETON: No foci of abnormal hypermetabolism in the axial and visualized  appendicular skeleton. IMPRESSION:   1. Right palatine tonsil tumor involvement (Deauville 5). 2. Bilateral cervical delaney involvement (Deauville 5). 3. Left axillary node involvement (Deauville 5). 4. Bulky retroperitoneal lymphoma mass and additional smaller hypermetabolic  abdomino-pelvic nodes (Deauville 5). 5. Bilateral inguinal dleaney involvement (Deauville 5). Deauville Five Point Scale  1. No uptake or no residual uptake (when used interim)  2. Slight uptake, but below blood pool (mediastinum)  3. Uptake above mediastinal, but below/equal to uptake in the liver  4. Uptake slightly to moderately higher than liver  5. Markedly increased uptake or any new lesion (on response evaluation)  Each FDG-avid (or previously FDG avid) lesion is rated independently. Reference values:  Mediastinal blood pool: 2.1 SUV  Liver (background): 2.2 SUV    PET/CT 2/05/19:   IMPRESSION:  1. No Foci of Abnormal Hypermetabolism (Deauville 1). 2. Resolved activity in the right palatine tonsil, bilateral cervical nodes,left axillary node, retroperitoneal/abdominal pelvic adenopathy, bilateral inguinal nodes. Echo 2/14/19:  Normal cavity size, wall thickness and systolic function (ejection fraction normal).  The muscle mass is normal. The cavity shape is normal. The estimated ejection fraction is 41 - 45%. Abnormal wall motion as described on the wall scoring diagram below. End-systolic volume is normal. Normal left ventricular strain. There is mild (grade 1) left ventricular diastolic dysfunction. Normal left ventricular diastolic pressure. End-diastolic volume is normal.    LE arterial duplex 2/22/19:  There is evidence of left groin pseudoaneurysm noted arising from distal common femoral artery, pseudo lobe measures 2.32cm x 2.58cm and pseudo neck length measuring 0.63cm. There is no evidence of hemodynamically significant left lower extremity arterial obstruction. JACLYN is 1.03 on the right and 1.02 on the left. LE arterial duplex s/p Thrombin Injection to Pseudoaneurysm 2/26/19:  Successful thrombin injection procedure of the left groin with no further flow seen. No evidence of hemodnyamically significant obstruction in the left lower extremity. Left lower extremity arterial duplex performed. Confirmed pseudoaneurysm in left groin with small neck. Following thrombin injection, no further flow seen in the pseudoaneurysm. The left common femoral, profunda femoral, femoral, popliteal, posterior tibial and anterior arteries were imaged. Mainly triphasic flow was seen with no evidence of significantly elevated velocities. Repeat LE arterial duplex 2/27/19:  Continued thrombosed left groin pseudoaneurysm following thrombin injection on 02/26/2019. No flow or color fill is identified. The hematoma measures approximately 2.1 x 2.9 cm in diameter. The common femoral, deep femoral, femoral, and popliteal arteries are patent with mainly tri-phasic flow and no significant hemodynamically obstruction is noted. Stress 5/31/19:  Normal stress myocardial perfusion without ischemia or infact at 84% MPHR. Normal LV function. LVEF 60%. No EKG changes of ischemia at peak exercise. Normal functional capacity. PET 6/03/19:   IMPRESSION: No Foci of Abnormal Hypermetabolism (Armen 1).    -MRI lumbar spine 12/18/19:  Mild disc degenerative change at L3-4 and L4-5. Mild canal stenosis at L3-4 and mild left foraminal stenosis at L4-5. Other less severe degenerative findings are as described above. Continued diminished size of retroperitoneal mass-adenopathy,  with diminished soft tissue density at the left renal hilum. -CT chest/abdomen 12/16/19:  Findings are consistent with interval response to therapy    CT c/a/p 5/28/20: IMPRESSION:  Further contraction of the retroperitoneal mantle and chris mesentery,  compatible with treatment response  Stable left hilar soft tissue mass  Slight increased splaying of the duodenum and proximal jejunum is the result, without obstruction  No evidence for recurrence of lymphoma in the chest, abdomen, or pelvis    CT c/a/p 2/13/22:  FINDINGS:   LOWER THORAX: Dependent atelectasis with otherwise clear lungs. The visualized  heart is normal in size without pericardial effusion. LIVER: No mass. BILIARY TREE: Gallbladder is unremarkable. CBD is not dilated. SPLEEN: Unremarkable. PANCREAS: No mass or ductal dilatation. ADRENALS: Unremarkable. KIDNEYS: Atrophic left kidney with mild left hydronephrosis. Right kidney is  unremarkable with no stone, enhancing mass, or other renal abnormality. STOMACH: Unremarkable. SMALL BOWEL: No dilatation or wall thickening. COLON: Circumferential wall thickening at the hepatic flexure with luminal  narrowing, adjacent mesenteric stranding, and fluid with upstream retention in  the cecum and fecalization of distal ileal contents. No dilation or other wall  thickening. APPENDIX: Unremarkable. PERITONEUM: Moderate free fluid with no pneumoperitoneum. RETROPERITONEUM: Soft tissue stranding around the celiac and SMA and associated aorta with no discrete mass or adenopathy. Aorta is normal in size without aneurysm or dissection. REPRODUCTIVE ORGANS: Prostate and seminal vesicles appear unremarkable.   URINARY BLADDER: No mass or calculus. BONES: No destructive bone lesion. ABDOMINAL WALL: No mass or hernia. ADDITIONAL COMMENTS: N/A  IMPRESSION  1. Annular mass lesion of the right colon with upstream fecal retention  concerning for primary colon neoplasm versus less likely lymphoma. 2. Soft tissue stranding around celiac axis, SMA, and abdominal aorta may  reflect infiltrative lymphoma. 3. Left renal atrophy with left hydronephrosis likely reflecting chronic  proximal ureteral obstruction. 4. Incidentals as above. 2/24/22 CT abd/pelvis at [facility]:  FINDINGS: An enteric tube with sidehole beyond the level of the lower esophageal sphincter is seen looping on itself in the proximal stomach before terminating in the mid stomach. Status post right lower quadrant diverting ileostomy for an obstructing colonic mass. Multiple loops of gas dilated small bowel remain, with a maximal diameter of 5.4 cm whereas previously measured 3.6 cm. Dilute contrast is seen within the lateral left abdominal dilated small bowel. Moderate stool burden and bowel gas opacifies the cecum, measuring 7.3 cm in diameter. No definite supine evidence of pneumoperitoneum. Lung bases: Limited evaluation of the lung bases demonstrates a cardiac silhouette within normal limits for size. A small bore central venous catheter is seen terminating in the right atrium. No focal consolidation or pleural effusion. 2/24/22 CT abd/pelvis [facility]:  IMPRESSION:  1. Status post right lower quadrant loop ileostomy. Mild diffuse dilation of small bowel proximal to the ostomy in the lower abdomen and upper pelvis concerning for at least mild to moderate partial bowel obstruction. Relatively decompressed loop ileostomy. 2. Mildly complex pelvic fluid collection in the rectovesical space, decreased in size from prior. 3. Redemonstrated ascending colon mass and findings suspicious for regional metastatic disease.   4. Redemonstrated soft tissue in the retroperitoneum with prominent lymph nodes, similar to prior examination. Differential would include metastasis, retroperitoneal fibrosis. 5. Mild atherosclerosis. 6. Subcentimeter right renal hypodensity, too small to characterize. 7. Severe compression of the left renal vein, similar to prior examination. 8. Additional findings as described above. Bones: No acute osseous abnormality. 2/24/22 CT chest [facility]:  IMPRESSION:  FINAL report. 1. No evidence of metastatic disease to the chest.  2. No enlarged lymph nodes. 3. Increasing volume loss in the lung bases which may be due to atelectasis. 4. CT abdomen and pelvis reported separately. 2/25/22 Colonoscopy at [facility]:  Impression:            - Preparation of the colon was inadequate. - Stool in the entire examined colon. - Likely malignant completely obstructing tumor at the                         hepatic flexure. Biopsied.                        - Malignant-appearing tumor in the colon. Complications:         No immediate complications. 7/19/22 CT ch/abd/pelv:  IMPRESSION  Response to treatment characterized by decrease in size of the apical core  lesion in the proximal transverse colon and decreased mucosal colic  lymphadenopathy. Persistent mesenteric lymphadenopathy and retroperitoneal/mesenteric soft tissue  which may relate to the patient's history of lymphoma. Chronic left renal atrophy with chronic left hydronephrosis. RECIST:  Target lesions:  Transverse colon mass, series 2, image 75, 27 x 26 mm, previously 49 x 45 mm. Nontarget lesions:  Transverse mesocolic lymph nodes, decreased. Assessment & Plan:   Sheila Castillo is a 42 y.o. male comes in for evaluation and management of lymphoma. 1. Undifferentiated carcinoma of transverse colon, metastatic, KRAS/NRAS status unclear:  S/p diverting ileostomy due to large bowel obstruction. Colonoscopy with biopsy on 2/25/22.  No obvious metastatic disease on CT imaging, but did have obvious metastatic disease to peritoneum during laparoscopy with Dr. Meagn Renner. I recommended FOLFOXIRI every 2 weeks. Will not give Bevacizumab given h/o MRI and tobacco use. Lyubov suggests: BRCA2 and YVONNE mutations support use of Olaparib or similar PARP inhibitor in future. They also suggest testing for TMB, MSI, PDL1 to determine utility of checkpoint inhibitors. CT after 4 cycles of treatment shows response to treatment with decrease size in transverse colon mass on 7/19/22. Supportive medications: zofran, compazine, dexamethasone, EMLA topical  -- Proceed with C7 of FOLFOXIRI without neulasta support. Consider adding Ziextendo if persistent neutropenia. -- Repeat [facility] testing 8/22/22, results pending. Looking into adding [employer] or alternate NGS testing to determine TMB, MSI, PDL1, KRAS/NRAS/BRAF. -- Check CEA every 8 weeks  -- Consider repeat colonoscopy in 4-6 months given incomplete colonoscopy. -- Repeat CT of ch/abd/pelvis after C8.   -- Follow up in 2 weeks for C8 FOLFOXIRI, MD/NP visit. -- 40 meq KCL in [facility] today. 2. H/o Follicular lymphoma:   Grade 3a with with bulky disease encircling the aorta, causing pain. Bone marrow negative for lymphoma, but was hypercellular. BR better than RCHOP, but based on GALLIUM study, Obinutuzumab-based induction and maintenance prolongs PFS over that seen with rituximab-based therapy. Therefore, pt started on O-CHOP regimen. He received 2 cycles with CR and was then switched to BR x 4 cycles given STEMI. Completed treatment 5/2019. PET completed 6/3/19 showed CR. CT 5/28/20 negative for disease. No extra surveillance needed at this time given metastatic colon cancer with frequent imaging.      3. Chronic lumbar back pain / anxiety / RLQ / insomnia:   Left lower back pain is chronic/stable and RLQ is likely related to neoplasm/colostomy - currently managing pain on Oxycontin 10mg prn, fentanyl and Lexapro daily. Signed pain contract on 12/28/18 and following with Dr. Anju Yung. Trazodone, melatonin not helping insomnia. Reviewed sleep hygiene. 4. CAD / HTN:   h/o STEMI 2/14/29 with TOM placed to proximal LAD. Following with Dr. Niyah Bonilla and remains on dual antiplatelet therapy, high dose Lipitor, Metoprolol, ACEI. Received only 2 doses of cardiotoxic chemo, Doxorubicin in early 1/2019. Is overdue for follow up with Dr. Niyah Bonilla. 5. Tobacco abuse:   Discussed benefits of quitting smoking again. Previously discussed replacing hand-to-mouth habit with another item such as Twizzlers or SlimJims. 6. BRCA2 mutation:  Will discuss with patient in future. He would benefit from genetic counseling for himself and his family. 7. Weight-loss/ fatigue:   Improving. Encouraged supplementing with 2 boost/ensures daily in addition to meals and 60 oz water daily and advised scheduled antiemetics on days 4-6.     8. H/o chemotherapy induced neutropenia:  Neulasta added with C3 forward. Advised neutropenic precautions. Held with C5 due to insurance authorization. Consider adding Ziextendo if persistent neutropenia. Goals of care: Disease is not curable, but is treatable to improve quality and duration of life. I personally saw and evaluated the patient and performed the key components of medical decision making. The history, physical exam, and documentation were performed by Dona Sarmiento NP. I reviewed and verified the above documentation and modified it as needed. Proceed with C7 of FOLFOXIRI today. Neulasta on body not covered by insurance and pt does not want to return to infusion center an additional day. Will continue without Neulasta for now and will monitor counts carefully. Thrombocytopenia resolved today.      Signed By: Andre Mcnair MD

## 2022-08-25 ENCOUNTER — TELEPHONE (OUTPATIENT)
Dept: PALLATIVE CARE | Age: 47
End: 2022-08-25

## 2022-08-25 NOTE — TELEPHONE ENCOUNTER
Per denial letter from [facility] patient must try and fail two of the preferred drug dosages : Morphine sulfate ER 15 mg, 30  mg, 60 mg, 100 mg, or 200 mg.

## 2022-08-25 NOTE — TELEPHONE ENCOUNTER
The patient states insurance denied his pain medication. They are asking him to try other options.   # [id number]

## 2022-08-26 NOTE — TELEPHONE ENCOUNTER
Palliative Medicine  Nursing Note  [phone]  Fax [phone]      Telephone Call  Patient Name: Dominic Vera. YOB: [birth year]        Primary Decision Maker: Obie Shoemaker - Girlfriend - [phone]   Advance Care Planning 8/22/2022   Patient's Healthcare Decision Maker is: Legal Next of Kin   Confirm Advance Directive None   Patient Would Like to Complete Advance Directive -   Does the patient have other document types -       Incoming call from . Scott Escalante who states that the Fentanyl did not work well for him this past month and when he had morphine injections in the hospital they made him sick. His insurance is requesting that he tried and failed these 2 medications, which he has, prior to the [employer] approval.  Will re-process the PA via cover my meds and see if they approve. No response from insurance company at close of business today, though expedited request. Will check back on Monday.             Phu Santos RN  Palliative Medicine

## 2022-08-29 ENCOUNTER — HOSPITAL ENCOUNTER (OUTPATIENT)
Dept: INFUSION THERAPY | Age: 47
Discharge: HOME | End: 2022-08-29
Payer: MEDICAID

## 2022-08-29 ENCOUNTER — TELEPHONE (OUTPATIENT)
Dept: PALLATIVE CARE | Age: 47
End: 2022-08-29

## 2022-08-29 ENCOUNTER — OFFICE VISIT (OUTPATIENT)
Dept: ONCOLOGY | Age: 47
End: 2022-08-29
Payer: MEDICAID

## 2022-08-29 VITALS
RESPIRATION RATE: 17 BRPM | HEART RATE: 61 BPM | DIASTOLIC BLOOD PRESSURE: 84 MMHG | OXYGEN SATURATION: 97 % | HEIGHT: 67 IN | TEMPERATURE: 98.6 F | BODY MASS INDEX: 21.74 KG/M2 | SYSTOLIC BLOOD PRESSURE: 121 MMHG | WEIGHT: 138.5 LBS

## 2022-08-29 VITALS
BODY MASS INDEX: 21.66 KG/M2 | TEMPERATURE: 98.6 F | HEIGHT: 67 IN | WEIGHT: 138 LBS | RESPIRATION RATE: 16 BRPM | OXYGEN SATURATION: 97 % | DIASTOLIC BLOOD PRESSURE: 72 MMHG | HEART RATE: 76 BPM | SYSTOLIC BLOOD PRESSURE: 133 MMHG

## 2022-08-29 DIAGNOSIS — T45.1X5A CHEMOTHERAPY INDUCED NEUTROPENIA (HCC): ICD-10-CM

## 2022-08-29 DIAGNOSIS — E87.6 HYPOKALEMIA: ICD-10-CM

## 2022-08-29 DIAGNOSIS — D70.1 CHEMOTHERAPY INDUCED NEUTROPENIA (HCC): ICD-10-CM

## 2022-08-29 DIAGNOSIS — Z51.11 CHEMOTHERAPY MANAGEMENT, ENCOUNTER FOR: ICD-10-CM

## 2022-08-29 DIAGNOSIS — R10.31 RLQ ABDOMINAL PAIN: ICD-10-CM

## 2022-08-29 DIAGNOSIS — C18.4 CARCINOMA OF TRANSVERSE COLON (HCC): Primary | ICD-10-CM

## 2022-08-29 DIAGNOSIS — T45.1X5A CINV (CHEMOTHERAPY-INDUCED NAUSEA AND VOMITING): ICD-10-CM

## 2022-08-29 DIAGNOSIS — R11.2 CINV (CHEMOTHERAPY-INDUCED NAUSEA AND VOMITING): ICD-10-CM

## 2022-08-29 DIAGNOSIS — Z76.89 PREVENTION OF CHEMOTHERAPY-INDUCED NEUTROPENIA: ICD-10-CM

## 2022-08-29 DIAGNOSIS — C82.90 FOLLICULAR LYMPHOMA, UNSPECIFIED FOLLICULAR LYMPHOMA TYPE, UNSPECIFIED BODY REGION (HCC): ICD-10-CM

## 2022-08-29 DIAGNOSIS — C18.9 COLON ADENOCARCINOMA (HCC): Primary | ICD-10-CM

## 2022-08-29 LAB
ALBUMIN SERPL-MCNC: 3.2 G/DL (ref 3.5–5)
ALBUMIN/GLOB SERPL: 0.9 {RATIO} (ref 1.1–2.2)
ALP SERPL-CCNC: 203 U/L (ref 45–117)
ALT SERPL-CCNC: 36 U/L (ref 12–78)
ANION GAP SERPL CALC-SCNC: 5 MMOL/L (ref 5–15)
AST SERPL-CCNC: 29 U/L (ref 15–37)
BASOPHILS # BLD: 0.1 K/UL (ref 0–0.1)
BASOPHILS NFR BLD: 2 % (ref 0–1)
BILIRUB SERPL-MCNC: 0.4 MG/DL (ref 0.2–1)
BUN SERPL-MCNC: 7 MG/DL (ref 6–20)
BUN/CREAT SERPL: 8 (ref 12–20)
CALCIUM SERPL-MCNC: 9.4 MG/DL (ref 8.5–10.1)
CHLORIDE SERPL-SCNC: 109 MMOL/L (ref 97–108)
CO2 SERPL-SCNC: 27 MMOL/L (ref 21–32)
CREAT SERPL-MCNC: 0.87 MG/DL (ref 0.7–1.3)
DIFFERENTIAL METHOD BLD: ABNORMAL
EOSINOPHIL # BLD: 0.3 K/UL (ref 0–0.4)
EOSINOPHIL NFR BLD: 6 % (ref 0–7)
ERYTHROCYTE [DISTWIDTH] IN BLOOD BY AUTOMATED COUNT: 22.7 % (ref 11.5–14.5)
GLOBULIN SER CALC-MCNC: 3.5 G/DL (ref 2–4)
GLUCOSE SERPL-MCNC: 138 MG/DL (ref 65–100)
HCT VFR BLD AUTO: 36.5 % (ref 36.6–50.3)
HGB BLD-MCNC: 11.6 G/DL (ref 12.1–17)
IMM GRANULOCYTES # BLD AUTO: 0 K/UL (ref 0–0.04)
IMM GRANULOCYTES NFR BLD AUTO: 1 % (ref 0–0.5)
LYMPHOCYTES # BLD: 1.1 K/UL (ref 0.8–3.5)
LYMPHOCYTES NFR BLD: 24 % (ref 12–49)
MCH RBC QN AUTO: 32 PG (ref 26–34)
MCHC RBC AUTO-ENTMCNC: 31.8 G/DL (ref 30–36.5)
MCV RBC AUTO: 100.8 FL (ref 80–99)
MONOCYTES # BLD: 0.6 K/UL (ref 0–1)
MONOCYTES NFR BLD: 14 % (ref 5–13)
NEUTS SEG # BLD: 2.4 K/UL (ref 1.8–8)
NEUTS SEG NFR BLD: 53 % (ref 32–75)
NRBC # BLD: 0 K/UL (ref 0–0.01)
NRBC BLD-RTO: 0 PER 100 WBC
PLATELET # BLD AUTO: 152 K/UL (ref 150–400)
PMV BLD AUTO: 10.7 FL (ref 8.9–12.9)
POTASSIUM SERPL-SCNC: 3.2 MMOL/L (ref 3.5–5.1)
PROT SERPL-MCNC: 6.7 G/DL (ref 6.4–8.2)
RBC # BLD AUTO: 3.62 M/UL (ref 4.1–5.7)
RBC MORPH BLD: ABNORMAL
RBC MORPH BLD: ABNORMAL
SODIUM SERPL-SCNC: 141 MMOL/L (ref 136–145)
WBC # BLD AUTO: 4.5 K/UL (ref 4.1–11.1)

## 2022-08-29 PROCEDURE — 74011000258 HC RX REV CODE- 258: Performed by: INTERNAL MEDICINE

## 2022-08-29 PROCEDURE — 96417 CHEMO IV INFUS EACH ADDL SEQ: CPT

## 2022-08-29 PROCEDURE — 96375 TX/PRO/DX INJ NEW DRUG ADDON: CPT

## 2022-08-29 PROCEDURE — 96415 CHEMO IV INFUSION ADDL HR: CPT

## 2022-08-29 PROCEDURE — 77030016057 HC NDL HUBR APOL -B

## 2022-08-29 PROCEDURE — 80053 COMPREHEN METABOLIC PANEL: CPT

## 2022-08-29 PROCEDURE — 96413 CHEMO IV INFUSION 1 HR: CPT

## 2022-08-29 PROCEDURE — 74011250636 HC RX REV CODE- 250/636: Performed by: INTERNAL MEDICINE

## 2022-08-29 PROCEDURE — 99215 OFFICE O/P EST HI 40 MIN: CPT | Performed by: INTERNAL MEDICINE

## 2022-08-29 PROCEDURE — 36415 COLL VENOUS BLD VENIPUNCTURE: CPT

## 2022-08-29 PROCEDURE — 85025 COMPLETE CBC W/AUTO DIFF WBC: CPT

## 2022-08-29 PROCEDURE — 74011250637 HC RX REV CODE- 250/637: Performed by: NURSE PRACTITIONER

## 2022-08-29 RX ORDER — DEXTROSE MONOHYDRATE 50 MG/ML
25 INJECTION, SOLUTION INTRAVENOUS CONTINUOUS
Status: DISPENSED | OUTPATIENT
Start: 2022-08-29 | End: 2022-08-29

## 2022-08-29 RX ORDER — HEPARIN 100 UNIT/ML
300-500 SYRINGE INTRAVENOUS AS NEEDED
Status: ACTIVE | OUTPATIENT
Start: 2022-08-29 | End: 2022-08-29

## 2022-08-29 RX ORDER — SODIUM CHLORIDE 0.9 % (FLUSH) 0.9 %
10 SYRINGE (ML) INJECTION AS NEEDED
Status: DISPENSED | OUTPATIENT
Start: 2022-08-29 | End: 2022-08-29

## 2022-08-29 RX ORDER — SODIUM CHLORIDE 9 MG/ML
10 INJECTION INTRAMUSCULAR; INTRAVENOUS; SUBCUTANEOUS AS NEEDED
Status: ACTIVE | OUTPATIENT
Start: 2022-08-29 | End: 2022-08-29

## 2022-08-29 RX ORDER — POTASSIUM CHLORIDE 750 MG/1
40 TABLET, FILM COATED, EXTENDED RELEASE ORAL
Status: COMPLETED | OUTPATIENT
Start: 2022-08-29 | End: 2022-08-29

## 2022-08-29 RX ORDER — PALONOSETRON 0.05 MG/ML
0.25 INJECTION, SOLUTION INTRAVENOUS ONCE
Status: COMPLETED | OUTPATIENT
Start: 2022-08-29 | End: 2022-08-29

## 2022-08-29 RX ORDER — SODIUM CHLORIDE 9 MG/ML
25 INJECTION, SOLUTION INTRAVENOUS CONTINUOUS
Status: DISCONTINUED | OUTPATIENT
Start: 2022-08-29 | End: 2022-08-31 | Stop reason: HOSPADM

## 2022-08-29 RX ADMIN — POTASSIUM CHLORIDE 40 MEQ: 750 TABLET, EXTENDED RELEASE ORAL at 09:51

## 2022-08-29 RX ADMIN — DEXTROSE MONOHYDRATE 281 MG: 5 INJECTION, SOLUTION INTRAVENOUS at 10:58

## 2022-08-29 RX ADMIN — LEUCOVORIN CALCIUM 680 MG: 350 INJECTION, POWDER, LYOPHILIZED, FOR SUSPENSION INTRAMUSCULAR; INTRAVENOUS at 12:45

## 2022-08-29 RX ADMIN — OXALIPLATIN 144.5 MG: 5 INJECTION, SOLUTION, CONCENTRATE INTRAVENOUS at 12:45

## 2022-08-29 RX ADMIN — DEXAMETHASONE SODIUM PHOSPHATE 12 MG: 4 INJECTION, SOLUTION INTRA-ARTICULAR; INTRALESIONAL; INTRAMUSCULAR; INTRAVENOUS; SOFT TISSUE at 09:57

## 2022-08-29 RX ADMIN — PALONOSETRON 0.25 MG: 0.05 INJECTION, SOLUTION INTRAVENOUS at 09:52

## 2022-08-29 RX ADMIN — DEXTROSE MONOHYDRATE 25 ML/HR: 50 INJECTION, SOLUTION INTRAVENOUS at 09:55

## 2022-08-29 RX ADMIN — FLUOROURACIL 4080 MG: 50 INJECTION, SOLUTION INTRAVENOUS at 14:58

## 2022-08-29 NOTE — PROGRESS NOTES
[facility] Progress Note    Date: 2022    Name: Zenovia Halsted. MRN: [id number]         : 1975    Mr. Omero Rendon Arrived ambulatory and in no distress for C7D1 of Folfoxiri Regimen. Assessment was completed & port access completed by JOLENE Jordan RN. Potassium 3.2- PO potassium added to treatment today. Chemotherapy Flowsheet 2022   Cycle C7D1   Date 2022   Drug / Regimen Folfoxfiri   Post Hydration -   Pre Meds -   Notes -       Patient proceed to appointment with Dr. Tracy Saravia. Mr. Norva Castleman vitals were reviewed. Patient Vitals for the past 12 hrs:   Temp Pulse Resp BP SpO2   22 1449 -- 61 -- 121/84 --   22 0819 98.6 °F (37 °C) 76 17 133/72 97 %       Lab results were obtained and reviewed. Recent Results (from the past 12 hour(s))   CBC WITH AUTOMATED DIFF    Collection Time: 22  8:29 AM   Result Value Ref Range    WBC 4.5 4.1 - 11.1 K/uL    RBC 3.62 (L) 4.10 - 5.70 M/uL    HGB 11.6 (L) 12.1 - 17.0 g/dL    HCT 36.5 (L) 36.6 - 50.3 %    .8 (H) 80.0 - 99.0 FL    MCH 32.0 26.0 - 34.0 PG    MCHC 31.8 30.0 - 36.5 g/dL    RDW 22.7 (H) 11.5 - 14.5 %    PLATELET 989 231 - 598 K/uL    MPV 10.7 8.9 - 12.9 FL    NRBC 0.0 0  WBC    ABSOLUTE NRBC 0.00 0.00 - 0.01 K/uL    NEUTROPHILS 53 32 - 75 %    LYMPHOCYTES 24 12 - 49 %    MONOCYTES 14 (H) 5 - 13 %    EOSINOPHILS 6 0 - 7 %    BASOPHILS 2 (H) 0 - 1 %    IMMATURE GRANULOCYTES 1 (H) 0.0 - 0.5 %    ABS. NEUTROPHILS 2.4 1.8 - 8.0 K/UL    ABS. LYMPHOCYTES 1.1 0.8 - 3.5 K/UL    ABS. MONOCYTES 0.6 0.0 - 1.0 K/UL    ABS. EOSINOPHILS 0.3 0.0 - 0.4 K/UL    ABS. BASOPHILS 0.1 0.0 - 0.1 K/UL    ABS. IMM.  GRANS. 0.0 0.00 - 0.04 K/UL    DF SMEAR SCANNED      RBC COMMENTS ANISOCYTOSIS  2+        RBC COMMENTS MACROCYTOSIS  1+       METABOLIC PANEL, COMPREHENSIVE    Collection Time: 22  8:29 AM   Result Value Ref Range    Sodium 141 136 - 145 mmol/L    Potassium 3.2 (L) 3.5 - 5.1 mmol/L    Chloride 109 (H) 97 - 108 mmol/L CO2 27 21 - 32 mmol/L    Anion gap 5 5 - 15 mmol/L    Glucose 138 (H) 65 - 100 mg/dL    BUN 7 6 - 20 MG/DL    Creatinine 0.87 0.70 - 1.30 MG/DL    BUN/Creatinine ratio 8 (L) 12 - 20      GFR est AA >60 >60 ml/min/1.73m2    GFR est non-AA >60 >60 ml/min/1.73m2    Calcium 9.4 8.5 - 10.1 MG/DL    Bilirubin, total 0.4 0.2 - 1.0 MG/DL    ALT (SGPT) 36 12 - 78 U/L    AST (SGOT) 29 15 - 37 U/L    Alk.  phosphatase 203 (H) 45 - 117 U/L    Protein, total 6.7 6.4 - 8.2 g/dL    Albumin 3.2 (L) 3.5 - 5.0 g/dL    Globulin 3.5 2.0 - 4.0 g/dL    A-G Ratio 0.9 (L) 1.1 - 2.2         Medications:    Medications Administered       dexamethasone (DECADRON) 12 mg in 0.9% sodium chloride 50 mL IVPB       Admin Date  08/29/2022 Action  New Bag Dose  12 mg Route  IntraVENous Administered By  Presley Rosario RN              dextrose 5% infusion       Admin Date  08/29/2022 Action  New Bag Dose  25 mL/hr Rate  25 mL/hr Route  IntraVENous Administered By  Presley Rosario RN              fluorouraciL (ADRUCIL) 4,080 mg in 0.9% sodium chloride 100 mL CADD Cassette       Admin Date  08/29/2022 Action  New Bag Dose  4,080 mg Rate  2.1 mL/hr Route  IntraVENous Administered By  Félix [facility]              irinotecan (CAMPTOSAR) 281 mg in dextrose 5% 250 mL, overfill volume 25 mL chemo infusion       Admin Date  08/29/2022 Action  New Bag Dose  281 mg Rate  192.7 mL/hr Route  IntraVENous Administered By  Presley Roasrio RN              leucovorin (WELLCOVORIN) 680 mg in dextrose 5% 250 mL, overfill volume 25 mL IVPB       Admin Date  08/29/2022 Action  New Bag Dose  680 mg Rate  154.5 mL/hr Route  IntraVENous Administered By  Presley Rosario RN              oxaliplatin (ELOXATIN) 144.5 mg in dextrose 5% 250 mL, overfill volume 25 mL chemo infusion       Admin Date  08/29/2022 Action  New Bag Dose  144.5 mg Rate  152 mL/hr Route  IntraVENous Administered By  Presley Rosario RN              palonosetron HCl (ALOXI) injection 0.25 mg       Admin Date  08/29/2022 Action  Given Dose  0.25 mg Route  IntraVENous Administered By  Mardeen Kocher, RN              potassium chloride SR (KLOR-CON 10) tablet 40 mEq       Admin Date  08/29/2022 Action  Given Dose  40 mEq Route  Oral Administered By  Mardeen Kocher, RN                        Two nurses verified prior to administering: Drug name, drug dose, infusion volume or drug volume when prepared in a syringe, rate of administration, route of administration,  expiration dates and/or times, appearance and physical integrity of the drugs, rate set on infusion pump, when used sequencing of drug administration. Mr. Mandy Grande tolerated treatment well and was discharged from [place] [address] in stable condition. Port de-accessed, flushed & heparinized per protocol. He is to return on 8/31/22 for his next appointment.     Marty Juarez RN  August 29, 2022

## 2022-08-29 NOTE — TELEPHONE ENCOUNTER
Palliative Medicine  Nursing Note  [phone]  Fax [phone]      Telephone Call  Patient Name: Diane Richter. YOB: [birth year]        Primary Decision Maker: Alireza Valerio - Girlfriend - [phone]   Advance Care Planning 8/22/2022   Patient's Healthcare Decision Maker is: Legal Next of Kin   Confirm Advance Directive None   Patient Would Like to Complete Advance Directive -   Does the patient have other document types -       Received fax that Oxycontin prescription was approved. Call placed to pharmacy and informed of the above. They stated that they will have to order it as it is not in stock and should be 1-2 days.      Samuel Josue RN  Palliative Medicine

## 2022-08-29 NOTE — TELEPHONE ENCOUNTER
Palliative Medicine  Nursing Note  [phone]  Fax [phone]      Telephone Call  Patient Name: Juan Hensley. YOB: [birth year]        Primary Decision Maker: Breezy Thomas - Girlfriend - [phone]   Advance Care Planning 8/22/2022   Patient's Healthcare Decision Maker is: Legal Next of Kin   Confirm Advance Directive None   Patient Would Like to Complete Advance Directive -   Does the patient have other document types -     Received notification that PA initiated for Oxycontin 10g via covermymeds on 8/26/22 was not approved. Sent in paper PA form via fax with further information regarding patient trying and failing Fentanyl patches and morphine ER, asked for expedited review.        Liz Kate RN  Palliative Medicine

## 2022-08-29 NOTE — PROGRESS NOTES
Gilma Patiño is a 42 y.o. male follow up for lymphoma. 1. Have you been to the ER, urgent care clinic since your last visit? Hospitalized since your last visit?no     2. Have you seen or consulted any other health care providers outside of the [address] [place] [address] since your last visit? Include any pap smears or colon screening.  No    Vitals 8/29/2022   Blood Pressure 133/72   Pulse 76   Temp 98.6   Resp 17   Height 5' 7\"   Weight 138 lb 8 oz   SpO2 97   BSA 1.72 m2   BMI 21.69 kg/m2   BP comment

## 2022-08-30 ENCOUNTER — TELEPHONE (OUTPATIENT)
Dept: FAMILY MEDICINE CLINIC | Age: 47
End: 2022-08-30

## 2022-08-30 NOTE — PROGRESS NOTES
[id number] [facility] at [address] [place] [address]  [phone]    Hematology / Oncology Established Visit    Reason for Visit:   Brown Joe is a 42 y.o. male who is seen for urgent follow up of colon cancer, h/o lymphoma. Hematology Oncology Treatment History:     Diagnosis #1: Follicular lymphoma  Stage: IV  Pathology:   11/13/18 right inguinal LN excision: Follicular lymphoma, high-grade (grade 3a of 3). Prior Treatment:   1. Obinutuzumab-CHOP. Obinutuzumab: 1000 mg weekly on days 1, 8, 15 for cycle 1, then 1000 mg on day 1 q21 days for cycles 2-6, then monotherapy 1000 mg every 21 days for cycle 7, 8 with Cyclophosphamide 750mg/m2, Doxorubicin 50mg/m2, Vincristine 1.4mg/m2 on day 1 and Prednisone 100mg on Days 1-5, every 21 days for a total of 2 cycles completed late 1/2019. Regimen discontinued due to STEMI. 2. Obinutuzumab + Bendamustine: 1000 mg Obinutuzumab on day 1 + Bendamustine 90mg/m2 on days 1-2 on a 28-day cycle x 4 cycles, completed 5/2019  Current Treatment: Surveillance      Diagnosis #2: Colon cancer:  Stage: IV  Pathology:  Ascending colon; biopsy: poorly differentiated carcinoma  Comment: No dysplasia is identified. Immunohistochemical stains performed on block 1A, show the tumor positive for Cam5.2 and shows patchy positivity for CDX2 with a Ki-67 index of -90%; the tumor is focally positive for CK5/6 and GATA3; negative for p63, Pax8, CK7, CK20, panmelanoma, synaptophysin and chromogranin. The immunophenotypic features are nonspecific but argues somewhat against the following carcinoma: urothelial, neuroendocrine and breast. The immunophenotypic features also argues against melanoma and somewhat against classic colorectal carcinoma. Additional immunohistochemical stains to exclude the possibility of prostate and hepatocellular carcinoma have been   ordered; the results will be reported as an addendum.   MLH1/MSH2/MSH6/PMS2 all intact with no loss of nuclear expression of MMR proteins - no MSI   Addendum: The addendum is issued to report the results of additional immunohistochemical stains in an attempt to ascertain the primary site of the carcinoma. The diagnosis is unchanged. Hepar and glypican 3 immunohistochemical stains are neg, arguing against hepatocellular carcinoma. PSA stain is negative, arguing against prostatic primary. Imaging studies to eval for poss primary sites maybe useful. In the absence of carcinoma/tumor at any other sites, this may represent an undifferentiated carcinoma of the colon. Oncogenomics (molecular) studies: POLE< ARID1A, YVONNE, ATR, BRCA2, CHECK1, FANCI, NF1, PIK3CA, PTCH1, PTEN, RAD50, RAD51, RB1, SMARCA4, STK11    Prior Treatment:   1. Loop ileostomy 2/19/22  2. Diagnotic laparoscopy with peritoneal biopsy, 4/27/22    Current Treatment: FOLFOXIRI every 2 weeks until disease progression or toxicity, 5/16/22 - current      Oncologic History:  Was in his usual state of health in early November 2018 when he developed low back pain and presented to the ER. Work-up at outside hospital revealed a retroperitoneal mass seen on CT imaging, and he was transferred to [facility] for further work-up. CT there showed a large retroperitoneal mass encircling the aorta with invasion of the left renal hilum and left adrenal gland. There were bilateral inguinal lymph nodes and moderate left hydronephrosis. He was evaluated while at [facility] and was noted to have palpable nodes in his groin for approximately the past 1 month. He underwent excisional LN biopsy of right inguinal LN, which revealed follicular lymphoma. At time of diagnosis, he had no cardiac disease aside from HTN, hyperlipidemia. However, he did have an NSTEMI after cycle 2 of O-CHOP. Was likely unrelated to chemotherapy, but opted to switch treatment to Obinutuzumab-Bendamustine. He completed treatment in 5/2019 and had a CR based on PET.     History of Present Illness:   Mr. Shayy Cho is a 42 y.o. male with prior h/o follicular lymphoma is seen for follow up of colon cancer and C8 of treatment. Signatera resulted. Patient reports he is feeling well. Denies any nausea, vomiting, or diarrhea. Reports adequate nutrition and hydration status. Denies any fever, chills. Denies SOB, chest pain. Pain in lower back is well-controlled on oxycodone. States Fentanyl patch did not help much in the past.     PMHx: Lymphoma in 2018, CAD, HTN, Hyperlipidemia, Anxiety, chronic low back pain  PSurgHx: None aside from cardiac stent placement and port placement  SHx: Smokes 1/2ppd x past 20 yrs. Works part time as a cook. Has 2 children. FHx: Father had leukemia, passed away from pancreatic cancer. Review of Systems: A complete review of systems was obtained, negative except as described above. Physical Exam:     Visit Vitals  /84   Pulse 68   Temp 98 °F (36.7 °C)   Resp 18   Ht 5' 7\" (1.702 m)   Wt 136 lb (61.7 kg)   SpO2 99%   BMI 21.30 kg/m²       ECOG PS: 0  General: no distress  Eyes: anicteric sclerae  HENT: oropharynx clear  Neck: supple  Lymphatic: no cervical, supraclavicular adenopathy  Respiratory: CTAB, normal respiratory effort  CV: no peripheral edema, port upper chest   GI: soft, nontender, nondistended, no masses;  colostomy in place. Skin: no rashes; no ecchymoses; no petechiae      Results:     Lab Results   Component Value Date/Time    WBC 3.4 (L) 09/12/2022 08:40 AM    HGB 11.4 (L) 09/12/2022 08:40 AM    HCT 34.8 (L) 09/12/2022 08:40 AM    PLATELET 93 (L) 65/22/0266 08:40 AM    MCV 98.9 09/12/2022 08:40 AM    ABS.  NEUTROPHILS 1.7 (L) 09/12/2022 08:40 AM    Hemoglobin (POC) 15.0 06/05/2009 02:13 PM    Hematocrit (POC) 39 02/14/2019 01:24 PM     Lab Results   Component Value Date/Time    Sodium 141 09/12/2022 08:40 AM    Potassium 3.6 09/12/2022 08:40 AM    Chloride 111 (H) 09/12/2022 08:40 AM    CO2 25 09/12/2022 08:40 AM    Glucose 127 (H) 09/12/2022 08:40 AM    BUN 10 2022 08:40 AM    Creatinine 0.85 2022 08:40 AM    GFR est AA >60 2022 08:40 AM    GFR est non-AA >60 2022 08:40 AM    Calcium 8.9 2022 08:40 AM    Sodium (POC) 136 2019 01:24 PM    Potassium (POC) 3.9 2019 01:24 PM    Chloride (POC) 102 2019 01:24 PM    Glucose (POC) 249 (H) 02/15/2019 10:21 PM    BUN (POC) 14 2019 01:24 PM    Creatinine (POC) 0.9 2019 01:24 PM    Calcium, ionized (POC) 1.24 2019 01:24 PM     Lab Results   Component Value Date/Time    Bilirubin, total 0.3 2022 08:40 AM    ALT (SGPT) 39 2022 08:40 AM    Alk. phosphatase 232 (H) 2022 08:40 AM    Protein, total 6.4 2022 08:40 AM    Albumin 2.9 (L) 2022 08:40 AM    Globulin 3.5 2022 08:40 AM     Lab Results   Component Value Date/Time    Iron 18 (L) 2022 11:13 AM    TIBC 173 (L) 2022 11:13 AM    Iron % saturation 10 (L) 2022 11:13 AM    Ferritin 426 (H) 2022 11:13 AM       No results found for: B12LT, FOL, RBCF  Lab Results   Component Value Date/Time    TSH 1.53 2016 04:40 AM       18:       Labs at [facility]:  22: CA 19-9 = 390  22: CEA = 12.3  22: CEA = 19.2  22: CEA = 17.9   22: CEA = 6.0    Signatera MRD:  22: 295.97 MTM/mL  22: 2.98  22: 0.88     Imagin/9/18 Abd/pelvis CT: IMPRESSION:  1. Interval development of a large retroperitoneal mass encircling the aorta with invasion of the left renal hilum and left adrenal gland. Several adjacent lymph nodes are seen extending into the peritoneum and underneath the  diaphragmatic natalie. This most likely represents lymphoma. 2. Several new bilateral enlarged inguinal lymph nodes also likely representing lymphoma. 3. Moderate left hydronephrosis with a delayed renal nephrogram related to decreased renal function. This is related to the invasion of the renal hilum. 18 Chest CT:   IMPRESSION:  Trace left pleural effusion. Bilateral lower lobe atelectasis. Large  retroperitoneal mass lesion again demonstrated. PET 12/18/18:  FINDINGS:  HEAD/NECK: Right palatine tonsil intense hypermetabolism, max SUV 18. Multilevel  bilateral cervical adenopathy, with max SUV 12 in a left supraclavicular node. Cerebral evaluation is limited by normal intense activity. CHEST: Solitary hypermetabolic left axillary node, max SUV 11. ABDOMEN/PELVIS: Bulky retroperitoneal mass max SUV 27, with several additional  small active abdomino-pelvic nodes. Bilateral inguinal nodes with max SUV 12 on  the left. SKELETON: No foci of abnormal hypermetabolism in the axial and visualized  appendicular skeleton. IMPRESSION:   1. Right palatine tonsil tumor involvement (Deauville 5). 2. Bilateral cervical delaney involvement (Deauville 5). 3. Left axillary node involvement (Deauville 5). 4. Bulky retroperitoneal lymphoma mass and additional smaller hypermetabolic  abdomino-pelvic nodes (Deauville 5). 5. Bilateral inguinal delaney involvement (Deauville 5). Deauville Five Point Scale  1. No uptake or no residual uptake (when used interim)  2. Slight uptake, but below blood pool (mediastinum)  3. Uptake above mediastinal, but below/equal to uptake in the liver  4. Uptake slightly to moderately higher than liver  5. Markedly increased uptake or any new lesion (on response evaluation)  Each FDG-avid (or previously FDG avid) lesion is rated independently. Reference values:  Mediastinal blood pool: 2.1 SUV  Liver (background): 2.2 SUV    PET/CT 2/05/19:   IMPRESSION:  1. No Foci of Abnormal Hypermetabolism (Deauville 1). 2. Resolved activity in the right palatine tonsil, bilateral cervical nodes,left axillary node, retroperitoneal/abdominal pelvic adenopathy, bilateral inguinal nodes. Echo 2/14/19:  Normal cavity size, wall thickness and systolic function (ejection fraction normal).  The muscle mass is normal. The cavity shape is normal. The estimated ejection fraction is 41 - 45%. Abnormal wall motion as described on the wall scoring diagram below. End-systolic volume is normal. Normal left ventricular strain. There is mild (grade 1) left ventricular diastolic dysfunction. Normal left ventricular diastolic pressure. End-diastolic volume is normal.    LE arterial duplex 2/22/19:  There is evidence of left groin pseudoaneurysm noted arising from distal common femoral artery, pseudo lobe measures 2.32cm x 2.58cm and pseudo neck length measuring 0.63cm. There is no evidence of hemodynamically significant left lower extremity arterial obstruction. JACLYN is 1.03 on the right and 1.02 on the left. LE arterial duplex s/p Thrombin Injection to Pseudoaneurysm 2/26/19:  Successful thrombin injection procedure of the left groin with no further flow seen. No evidence of hemodnyamically significant obstruction in the left lower extremity. Left lower extremity arterial duplex performed. Confirmed pseudoaneurysm in left groin with small neck. Following thrombin injection, no further flow seen in the pseudoaneurysm. The left common femoral, profunda femoral, femoral, popliteal, posterior tibial and anterior arteries were imaged. Mainly triphasic flow was seen with no evidence of significantly elevated velocities. Repeat LE arterial duplex 2/27/19:  Continued thrombosed left groin pseudoaneurysm following thrombin injection on 02/26/2019. No flow or color fill is identified. The hematoma measures approximately 2.1 x 2.9 cm in diameter. The common femoral, deep femoral, femoral, and popliteal arteries are patent with mainly tri-phasic flow and no significant hemodynamically obstruction is noted. Stress 5/31/19:  Normal stress myocardial perfusion without ischemia or infact at 84% MPHR. Normal LV function. LVEF 60%. No EKG changes of ischemia at peak exercise. Normal functional capacity. PET 6/03/19:   IMPRESSION: No Foci of Abnormal Hypermetabolism (Deauville 1). -MRI lumbar spine 12/18/19:  Mild disc degenerative change at L3-4 and L4-5. Mild canal stenosis at L3-4 and mild left foraminal stenosis at L4-5. Other less severe degenerative findings are as described above. Continued diminished size of retroperitoneal mass-adenopathy,  with diminished soft tissue density at the left renal hilum. -CT chest/abdomen 12/16/19:  Findings are consistent with interval response to therapy    CT c/a/p 5/28/20: IMPRESSION:  Further contraction of the retroperitoneal mantle and chris mesentery,  compatible with treatment response  Stable left hilar soft tissue mass  Slight increased splaying of the duodenum and proximal jejunum is the result, without obstruction  No evidence for recurrence of lymphoma in the chest, abdomen, or pelvis    CT c/a/p 2/13/22:  FINDINGS:   LOWER THORAX: Dependent atelectasis with otherwise clear lungs. The visualized  heart is normal in size without pericardial effusion. LIVER: No mass. BILIARY TREE: Gallbladder is unremarkable. CBD is not dilated. SPLEEN: Unremarkable. PANCREAS: No mass or ductal dilatation. ADRENALS: Unremarkable. KIDNEYS: Atrophic left kidney with mild left hydronephrosis. Right kidney is  unremarkable with no stone, enhancing mass, or other renal abnormality. STOMACH: Unremarkable. SMALL BOWEL: No dilatation or wall thickening. COLON: Circumferential wall thickening at the hepatic flexure with luminal  narrowing, adjacent mesenteric stranding, and fluid with upstream retention in  the cecum and fecalization of distal ileal contents. No dilation or other wall  thickening. APPENDIX: Unremarkable. PERITONEUM: Moderate free fluid with no pneumoperitoneum. RETROPERITONEUM: Soft tissue stranding around the celiac and SMA and associated aorta with no discrete mass or adenopathy. Aorta is normal in size without aneurysm or dissection.   REPRODUCTIVE ORGANS: Prostate and seminal vesicles appear unremarkable. URINARY BLADDER: No mass or calculus. BONES: No destructive bone lesion. ABDOMINAL WALL: No mass or hernia. ADDITIONAL COMMENTS: N/A  IMPRESSION  1. Annular mass lesion of the right colon with upstream fecal retention  concerning for primary colon neoplasm versus less likely lymphoma. 2. Soft tissue stranding around celiac axis, SMA, and abdominal aorta may  reflect infiltrative lymphoma. 3. Left renal atrophy with left hydronephrosis likely reflecting chronic  proximal ureteral obstruction. 4. Incidentals as above. 2/24/22 CT abd/pelvis at [facility]:  FINDINGS: An enteric tube with sidehole beyond the level of the lower esophageal sphincter is seen looping on itself in the proximal stomach before terminating in the mid stomach. Status post right lower quadrant diverting ileostomy for an obstructing colonic mass. Multiple loops of gas dilated small bowel remain, with a maximal diameter of 5.4 cm whereas previously measured 3.6 cm. Dilute contrast is seen within the lateral left abdominal dilated small bowel. Moderate stool burden and bowel gas opacifies the cecum, measuring 7.3 cm in diameter. No definite supine evidence of pneumoperitoneum. Lung bases: Limited evaluation of the lung bases demonstrates a cardiac silhouette within normal limits for size. A small bore central venous catheter is seen terminating in the right atrium. No focal consolidation or pleural effusion. 2/24/22 CT abd/pelvis [facility]:  IMPRESSION:  1. Status post right lower quadrant loop ileostomy. Mild diffuse dilation of small bowel proximal to the ostomy in the lower abdomen and upper pelvis concerning for at least mild to moderate partial bowel obstruction. Relatively decompressed loop ileostomy. 2. Mildly complex pelvic fluid collection in the rectovesical space, decreased in size from prior. 3. Redemonstrated ascending colon mass and findings suspicious for regional metastatic disease.   4. Redemonstrated soft tissue in the retroperitoneum with prominent lymph nodes, similar to prior examination. Differential would include metastasis, retroperitoneal fibrosis. 5. Mild atherosclerosis. 6. Subcentimeter right renal hypodensity, too small to characterize. 7. Severe compression of the left renal vein, similar to prior examination. 8. Additional findings as described above. Bones: No acute osseous abnormality. 2/24/22 CT chest [facility]:  IMPRESSION:  FINAL report. 1. No evidence of metastatic disease to the chest.  2. No enlarged lymph nodes. 3. Increasing volume loss in the lung bases which may be due to atelectasis. 4. CT abdomen and pelvis reported separately. 2/25/22 Colonoscopy at [facility]:  Impression:            - Preparation of the colon was inadequate. - Stool in the entire examined colon. - Likely malignant completely obstructing tumor at the                         hepatic flexure. Biopsied.                        - Malignant-appearing tumor in the colon. Complications:         No immediate complications. 7/19/22 CT ch/abd/pelv:  IMPRESSION  Response to treatment characterized by decrease in size of the apical core  lesion in the proximal transverse colon and decreased mucosal colic  lymphadenopathy. Persistent mesenteric lymphadenopathy and retroperitoneal/mesenteric soft tissue  which may relate to the patient's history of lymphoma. Chronic left renal atrophy with chronic left hydronephrosis. RECIST:  Target lesions:  Transverse colon mass, series 2, image 75, 27 x 26 mm, previously 49 x 45 mm. Nontarget lesions:  Transverse mesocolic lymph nodes, decreased. Assessment & Plan:   Diane RoberTj is a 42 y.o. male comes in for evaluation and management of lymphoma. 1. Undifferentiated carcinoma of transverse colon, metastatic, KRAS/NRAS status unclear:  S/p diverting ileostomy due to large bowel obstruction. Colonoscopy with biopsy on 2/25/22. No obvious metastatic disease on CT imaging, but did have obvious metastatic disease to peritoneum during laparoscopy with Dr. Francois Hardy. I recommended FOLFOXIRI every 2 weeks. Will not give Bevacizumab given h/o MRI and tobacco use. Lyubov suggests: BRCA2 and YVONNE mutations support use of Olaparib or similar PARP inhibitor in future. They also suggest testing for TMB, MSI, PDL1 to determine utility of checkpoint inhibitors. CT after 4 cycles of treatment shows response to treatment with decrease size in transverse colon mass on 7/19/22. Supportive medications: zofran, compazine, dexamethasone, EMLA topical  -- Proceed with C8 of FOLFOXIRI without neulasta support. Consider adding Ziextendo if persistent neutropenia. -- Repeat Signatera testing showed continued decline. Repeat in 10/2022. Looking into adding [employer] or alternate NGS testing to determine TMB, MSI, PDL1, KRAS/NRAS/BRAF. -- Check CEA every 8 weeks  -- Consider repeat colonoscopy in 4-6 months given incomplete colonoscopy. -- Repeat CT of ch/abd/pelvis after C8.   -- Follow up in 2 weeks for C9 FOLFOXIRI, MD/NP visit. 2. H/o Follicular lymphoma:   Grade 3a with with bulky disease encircling the aorta, causing pain. Bone marrow negative for lymphoma, but was hypercellular. BR better than RCHOP, but based on GALLIUM study, Obinutuzumab-based induction and maintenance prolongs PFS over that seen with rituximab-based therapy. Therefore, pt started on O-CHOP regimen. He received 2 cycles with CR and was then switched to BR x 4 cycles given STEMI. Completed treatment 5/2019. PET completed 6/3/19 showed CR. CT 5/28/20 negative for disease. No extra surveillance needed at this time given metastatic colon cancer with frequent imaging. 3. Chronic lumbar back pain / anxiety / RLQ / insomnia:   Left lower back pain is chronic/stable and RLQ is likely related to neoplasm/colostomy - currently managing pain on Oxycontin 10mg prn, and Lexapro daily. Signed pain contract on 12/28/18 and following with Dr. Quin Lynn. Trazodone, melatonin not helping insomnia. Reviewed sleep hygiene. 4. CAD / HTN:   h/o STEMI 2/14/29 with TOM placed to proximal LAD. Following with Dr. Asuncion Fisher and remains on dual antiplatelet therapy, high dose Lipitor, Metoprolol, ACEI. Received only 2 doses of cardiotoxic chemo, Doxorubicin in early 1/2019. Is overdue for follow up with Dr. Asuncion Fisher. 5. Tobacco abuse:   Discussed benefits of quitting smoking again. Previously discussed replacing hand-to-mouth habit with another item such as Twizzlers or SlimJims. 6. BRCA2 mutation:  Will discuss with patient in future. He would benefit from genetic counseling for himself and his family. 7. Weight-loss/ fatigue:   Improving. Encouraged supplementing with 2 boost/ensures daily in addition to meals and 60 oz water daily and advised scheduled antiemetics on days 4-6.     8. H/o chemotherapy induced neutropenia:  Neulasta added with C3 forward. Advised neutropenic precautions. Held with C5 due to insurance authorization. Consider adding Ziextendo if persistent neutropenia. 9. Chemotherapy induced thrombocytopenia:   Mild. Will hold for PLT < 75. Monitor for bleeding. Goals of care: Disease is not curable, but is treatable to improve quality and duration of life. I personally saw and evaluated the patient and performed the key components of medical decision making. The history, physical exam, and documentation were performed by Tyree Spring NP. I reviewed and verified the above documentation and modified it as needed. Proceed with C6 of FOLFOXIRI which pt is tolerating well without n/v/d, neuropathy. Monitoring ANC carefully. Due for imaging.       Signed By: Piter Yanez MD

## 2022-08-30 NOTE — TELEPHONE ENCOUNTER
The patient called seeking a pre authorization for his medication, oxycodone, 10mg, ER.   # [phone]

## 2022-08-30 NOTE — TELEPHONE ENCOUNTER
Palliative Medicine  Nursing Note  [phone]  Fax [phone]      Telephone Call  Patient Name: Anand Moseley. YOB: [birth year]        Primary Decision Maker: Tre Vee - Girlfriend - [phone]   Advance Care Planning 8/22/2022   Patient's Healthcare Decision Maker is: Legal Next of Kin   Confirm Advance Directive None   Patient Would Like to Complete Advance Directive -   Does the patient have other document types -     Call placed to Mr. Darinel Lockhart and discussed that his prior authorization was approved, but pharmacy needed to order it and thought it would take a day. Encouraged him to call the pharmacy and see if the medication has come in. He verbalized understanding and was appreciative.        Dianna Steele RN  Palliative Medicine

## 2022-08-31 ENCOUNTER — HOSPITAL ENCOUNTER (OUTPATIENT)
Dept: INFUSION THERAPY | Age: 47
Discharge: HOME | End: 2022-08-31
Payer: MEDICAID

## 2022-08-31 VITALS
SYSTOLIC BLOOD PRESSURE: 130 MMHG | RESPIRATION RATE: 16 BRPM | DIASTOLIC BLOOD PRESSURE: 85 MMHG | TEMPERATURE: 97.5 F | HEART RATE: 72 BPM

## 2022-08-31 DIAGNOSIS — Z76.89 PREVENTION OF CHEMOTHERAPY-INDUCED NEUTROPENIA: ICD-10-CM

## 2022-08-31 DIAGNOSIS — D70.1 CHEMOTHERAPY INDUCED NEUTROPENIA (HCC): Primary | ICD-10-CM

## 2022-08-31 DIAGNOSIS — T45.1X5A CHEMOTHERAPY INDUCED NEUTROPENIA (HCC): Primary | ICD-10-CM

## 2022-08-31 DIAGNOSIS — C18.9 COLON ADENOCARCINOMA (HCC): ICD-10-CM

## 2022-08-31 DIAGNOSIS — C82.90 FOLLICULAR LYMPHOMA, UNSPECIFIED FOLLICULAR LYMPHOMA TYPE, UNSPECIFIED BODY REGION (HCC): ICD-10-CM

## 2022-08-31 PROCEDURE — 96523 IRRIG DRUG DELIVERY DEVICE: CPT

## 2022-08-31 RX ORDER — SODIUM CHLORIDE 0.9 % (FLUSH) 0.9 %
10 SYRINGE (ML) INJECTION AS NEEDED
Status: DISCONTINUED | OUTPATIENT
Start: 2022-08-31 | End: 2022-09-01 | Stop reason: HOSPADM

## 2022-08-31 RX ORDER — SODIUM CHLORIDE 9 MG/ML
10 INJECTION INTRAMUSCULAR; INTRAVENOUS; SUBCUTANEOUS AS NEEDED
Status: DISCONTINUED | OUTPATIENT
Start: 2022-08-31 | End: 2022-09-01 | Stop reason: HOSPADM

## 2022-08-31 RX ORDER — HEPARIN 100 UNIT/ML
300-500 SYRINGE INTRAVENOUS AS NEEDED
Status: DISCONTINUED | OUTPATIENT
Start: 2022-08-31 | End: 2022-09-01 | Stop reason: HOSPADM

## 2022-08-31 NOTE — PROGRESS NOTES
[facility] Progress Note    Date: 2022    Name: Regina Garcia MRN: [id number]         : 1975:            Mr. Delilah Sherwood arrived ambulatory and in no distress for Pump Removal.  Assessment was completed, no acute issues at this time, no new complaints voiced. CADD completed in infusion center- 100 ml infused per order. Mr. Alisson Pollack vitals were reviewed. Visit Vitals  /85   Pulse 72   Temp 97.5 °F (36.4 °C)   Resp 16       Mr. Reed tolerated treatment well and was discharged from [place] [address] in stable condition at 1500. Port de-accessed, flushed & heparinized per protocol. He is to return on  at 0730 for his next appointment.     Δηληγιάννη 17  2022

## 2022-09-02 DIAGNOSIS — C18.9 COLON ADENOCARCINOMA (HCC): ICD-10-CM

## 2022-09-02 DIAGNOSIS — C78.6 PERITONEAL CARCINOMATOSIS (HCC): ICD-10-CM

## 2022-09-02 DIAGNOSIS — R10.84 ABDOMINAL PAIN, GENERALIZED: ICD-10-CM

## 2022-09-02 RX ORDER — OXYCODONE HYDROCHLORIDE 10 MG/1
10 TABLET ORAL
Qty: 90 TABLET | Refills: 0 | Status: SHIPPED | OUTPATIENT
Start: 2022-09-06 | End: 2022-09-16 | Stop reason: SDUPTHER

## 2022-09-02 NOTE — TELEPHONE ENCOUNTER
Triage for Controlled Substance Refill Request    Pain Diagnosis: _Colon adenocarcinoma ([facility] [address] (C18.9); Abdominal pain, generalized (R10.84); Peritoneal carcinomatosis ([facility] [address] (C78.6)    Last Outpatient Visit: _8/22/2022    Next Outpatient Visit: _10/11/2022    Reason for refill needed outside of office visit? Appointment not scheduled prior to need for scheduled refill      Pharmacy: _[facility]/pharmacy #[id number] [place] [address]      Medication:oxyCODONE IR (ROXICODONE) 10 mg tab immediate release tablet    Dose and directions: Take 1 Tablet by mouth every four (4) hours as needed for Pain for up to 15 days  Number dispensed:90  Date filled ( or Pharmacy):8/23/2022  #left:     reviewed: _yes    Date of Urine Drug Screen:  _yes    Opioid Safety Handout given:  _yes    Appropriate for refill:  _yes    Action:  _ Medication pending

## 2022-09-06 ENCOUNTER — APPOINTMENT (OUTPATIENT)
Dept: INFUSION THERAPY | Age: 47
End: 2022-09-06

## 2022-09-08 ENCOUNTER — APPOINTMENT (OUTPATIENT)
Dept: INFUSION THERAPY | Age: 47
End: 2022-09-08
Payer: MEDICAID

## 2022-09-12 ENCOUNTER — HOSPITAL ENCOUNTER (OUTPATIENT)
Dept: INFUSION THERAPY | Age: 47
Discharge: HOME | End: 2022-09-12
Payer: MEDICAID

## 2022-09-12 ENCOUNTER — OFFICE VISIT (OUTPATIENT)
Dept: ONCOLOGY | Age: 47
End: 2022-09-12

## 2022-09-12 VITALS
TEMPERATURE: 97.9 F | RESPIRATION RATE: 18 BRPM | HEIGHT: 67 IN | HEART RATE: 61 BPM | DIASTOLIC BLOOD PRESSURE: 83 MMHG | SYSTOLIC BLOOD PRESSURE: 144 MMHG | OXYGEN SATURATION: 97 % | WEIGHT: 136.2 LBS | BODY MASS INDEX: 21.38 KG/M2

## 2022-09-12 VITALS
DIASTOLIC BLOOD PRESSURE: 84 MMHG | BODY MASS INDEX: 21.35 KG/M2 | OXYGEN SATURATION: 99 % | RESPIRATION RATE: 18 BRPM | HEART RATE: 68 BPM | HEIGHT: 67 IN | SYSTOLIC BLOOD PRESSURE: 138 MMHG | TEMPERATURE: 98 F | WEIGHT: 136 LBS

## 2022-09-12 DIAGNOSIS — C18.9 COLON ADENOCARCINOMA (HCC): ICD-10-CM

## 2022-09-12 DIAGNOSIS — T45.1X5A CHEMOTHERAPY-INDUCED THROMBOCYTOPENIA: ICD-10-CM

## 2022-09-12 DIAGNOSIS — D70.1 CHEMOTHERAPY INDUCED NEUTROPENIA (HCC): Primary | ICD-10-CM

## 2022-09-12 DIAGNOSIS — T45.1X5A CHEMOTHERAPY INDUCED NEUTROPENIA (HCC): Primary | ICD-10-CM

## 2022-09-12 DIAGNOSIS — C18.4 CARCINOMA OF TRANSVERSE COLON (HCC): Primary | ICD-10-CM

## 2022-09-12 DIAGNOSIS — C82.90 FOLLICULAR LYMPHOMA, UNSPECIFIED FOLLICULAR LYMPHOMA TYPE, UNSPECIFIED BODY REGION (HCC): ICD-10-CM

## 2022-09-12 DIAGNOSIS — R10.31 RLQ ABDOMINAL PAIN: ICD-10-CM

## 2022-09-12 DIAGNOSIS — D69.59 CHEMOTHERAPY-INDUCED THROMBOCYTOPENIA: ICD-10-CM

## 2022-09-12 DIAGNOSIS — I25.10 CORONARY ARTERY DISEASE INVOLVING NATIVE CORONARY ARTERY OF NATIVE HEART, UNSPECIFIED WHETHER ANGINA PRESENT: ICD-10-CM

## 2022-09-12 DIAGNOSIS — Z51.11 CHEMOTHERAPY MANAGEMENT, ENCOUNTER FOR: ICD-10-CM

## 2022-09-12 DIAGNOSIS — Z76.89 PREVENTION OF CHEMOTHERAPY-INDUCED NEUTROPENIA: ICD-10-CM

## 2022-09-12 LAB
ALBUMIN SERPL-MCNC: 2.9 G/DL (ref 3.5–5)
ALBUMIN/GLOB SERPL: 0.8 {RATIO} (ref 1.1–2.2)
ALP SERPL-CCNC: 232 U/L (ref 45–117)
ALT SERPL-CCNC: 39 U/L (ref 12–78)
ANION GAP SERPL CALC-SCNC: 5 MMOL/L (ref 5–15)
AST SERPL-CCNC: 29 U/L (ref 15–37)
BASOPHILS # BLD: 0 K/UL (ref 0–0.1)
BASOPHILS NFR BLD: 1 % (ref 0–1)
BILIRUB SERPL-MCNC: 0.3 MG/DL (ref 0.2–1)
BUN SERPL-MCNC: 10 MG/DL (ref 6–20)
BUN/CREAT SERPL: 12 (ref 12–20)
CALCIUM SERPL-MCNC: 8.9 MG/DL (ref 8.5–10.1)
CHLORIDE SERPL-SCNC: 111 MMOL/L (ref 97–108)
CO2 SERPL-SCNC: 25 MMOL/L (ref 21–32)
CREAT SERPL-MCNC: 0.85 MG/DL (ref 0.7–1.3)
DIFFERENTIAL METHOD BLD: ABNORMAL
EOSINOPHIL # BLD: 0.3 K/UL (ref 0–0.4)
EOSINOPHIL NFR BLD: 8 % (ref 0–7)
ERYTHROCYTE [DISTWIDTH] IN BLOOD BY AUTOMATED COUNT: 18.1 % (ref 11.5–14.5)
GLOBULIN SER CALC-MCNC: 3.5 G/DL (ref 2–4)
GLUCOSE SERPL-MCNC: 127 MG/DL (ref 65–100)
HCT VFR BLD AUTO: 34.8 % (ref 36.6–50.3)
HGB BLD-MCNC: 11.4 G/DL (ref 12.1–17)
IMM GRANULOCYTES # BLD AUTO: 0 K/UL (ref 0–0.04)
IMM GRANULOCYTES NFR BLD AUTO: 0 % (ref 0–0.5)
LYMPHOCYTES # BLD: 1 K/UL (ref 0.8–3.5)
LYMPHOCYTES NFR BLD: 30 % (ref 12–49)
MCH RBC QN AUTO: 32.4 PG (ref 26–34)
MCHC RBC AUTO-ENTMCNC: 32.8 G/DL (ref 30–36.5)
MCV RBC AUTO: 98.9 FL (ref 80–99)
MONOCYTES # BLD: 0.4 K/UL (ref 0–1)
MONOCYTES NFR BLD: 11 % (ref 5–13)
NEUTS SEG # BLD: 1.7 K/UL (ref 1.8–8)
NEUTS SEG NFR BLD: 50 % (ref 32–75)
NRBC # BLD: 0 K/UL (ref 0–0.01)
NRBC BLD-RTO: 0 PER 100 WBC
PLATELET # BLD AUTO: 93 K/UL (ref 150–400)
PMV BLD AUTO: 10.5 FL (ref 8.9–12.9)
POTASSIUM SERPL-SCNC: 3.6 MMOL/L (ref 3.5–5.1)
PROT SERPL-MCNC: 6.4 G/DL (ref 6.4–8.2)
RBC # BLD AUTO: 3.52 M/UL (ref 4.1–5.7)
RBC MORPH BLD: ABNORMAL
SODIUM SERPL-SCNC: 141 MMOL/L (ref 136–145)
WBC # BLD AUTO: 3.4 K/UL (ref 4.1–11.1)

## 2022-09-12 PROCEDURE — 96417 CHEMO IV INFUS EACH ADDL SEQ: CPT

## 2022-09-12 PROCEDURE — 36415 COLL VENOUS BLD VENIPUNCTURE: CPT

## 2022-09-12 PROCEDURE — 77030012965 HC NDL HUBR BBMI -A

## 2022-09-12 PROCEDURE — 96375 TX/PRO/DX INJ NEW DRUG ADDON: CPT

## 2022-09-12 PROCEDURE — 74011250636 HC RX REV CODE- 250/636: Performed by: NURSE PRACTITIONER

## 2022-09-12 PROCEDURE — 96368 THER/DIAG CONCURRENT INF: CPT

## 2022-09-12 PROCEDURE — 96413 CHEMO IV INFUSION 1 HR: CPT

## 2022-09-12 PROCEDURE — 80053 COMPREHEN METABOLIC PANEL: CPT

## 2022-09-12 PROCEDURE — 74011000258 HC RX REV CODE- 258: Performed by: NURSE PRACTITIONER

## 2022-09-12 PROCEDURE — 85025 COMPLETE CBC W/AUTO DIFF WBC: CPT

## 2022-09-12 PROCEDURE — 96415 CHEMO IV INFUSION ADDL HR: CPT

## 2022-09-12 PROCEDURE — 96366 THER/PROPH/DIAG IV INF ADDON: CPT

## 2022-09-12 PROCEDURE — 99215 OFFICE O/P EST HI 40 MIN: CPT | Performed by: INTERNAL MEDICINE

## 2022-09-12 PROCEDURE — 96416 CHEMO PROLONG INFUSE W/PUMP: CPT

## 2022-09-12 RX ORDER — HEPARIN 100 UNIT/ML
300-500 SYRINGE INTRAVENOUS AS NEEDED
Status: ACTIVE | OUTPATIENT
Start: 2022-09-12 | End: 2022-09-12

## 2022-09-12 RX ORDER — SODIUM CHLORIDE 9 MG/ML
25 INJECTION, SOLUTION INTRAVENOUS CONTINUOUS
Status: DISCONTINUED | OUTPATIENT
Start: 2022-09-12 | End: 2022-09-14 | Stop reason: HOSPADM

## 2022-09-12 RX ORDER — PALONOSETRON 0.05 MG/ML
0.25 INJECTION, SOLUTION INTRAVENOUS ONCE
Status: COMPLETED | OUTPATIENT
Start: 2022-09-12 | End: 2022-09-12

## 2022-09-12 RX ORDER — ATROPINE SULFATE 0.4 MG/ML
0.4 INJECTION, SOLUTION ENDOTRACHEAL; INTRAMEDULLARY; INTRAMUSCULAR; INTRAVENOUS; SUBCUTANEOUS
Status: ACTIVE | OUTPATIENT
Start: 2022-09-12 | End: 2022-09-12

## 2022-09-12 RX ORDER — SODIUM CHLORIDE 9 MG/ML
10 INJECTION INTRAMUSCULAR; INTRAVENOUS; SUBCUTANEOUS AS NEEDED
Status: ACTIVE | OUTPATIENT
Start: 2022-09-12 | End: 2022-09-12

## 2022-09-12 RX ORDER — SODIUM CHLORIDE 0.9 % (FLUSH) 0.9 %
10 SYRINGE (ML) INJECTION AS NEEDED
Status: DISPENSED | OUTPATIENT
Start: 2022-09-12 | End: 2022-09-12

## 2022-09-12 RX ORDER — DEXTROSE MONOHYDRATE 50 MG/ML
25 INJECTION, SOLUTION INTRAVENOUS CONTINUOUS
Status: DISPENSED | OUTPATIENT
Start: 2022-09-12 | End: 2022-09-12

## 2022-09-12 RX ADMIN — OXALIPLATIN 144.5 MG: 5 INJECTION, SOLUTION, CONCENTRATE INTRAVENOUS at 13:49

## 2022-09-12 RX ADMIN — IRINOTECAN HYDROCHLORIDE 281 MG: 20 INJECTION, SOLUTION INTRAVENOUS at 11:56

## 2022-09-12 RX ADMIN — PALONOSETRON 0.25 MG: 0.05 INJECTION, SOLUTION INTRAVENOUS at 10:43

## 2022-09-12 RX ADMIN — FLUOROURACIL 4080 MG: 50 INJECTION, SOLUTION INTRAVENOUS at 16:11

## 2022-09-12 RX ADMIN — DEXAMETHASONE SODIUM PHOSPHATE 12 MG: 4 INJECTION, SOLUTION INTRA-ARTICULAR; INTRALESIONAL; INTRAMUSCULAR; INTRAVENOUS; SOFT TISSUE at 10:47

## 2022-09-12 RX ADMIN — DEXTROSE MONOHYDRATE 25 ML/HR: 50 INJECTION, SOLUTION INTRAVENOUS at 10:41

## 2022-09-12 RX ADMIN — LEUCOVORIN CALCIUM 680 MG: 200 INJECTION, POWDER, LYOPHILIZED, FOR SUSPENSION INTRAMUSCULAR; INTRAVENOUS at 13:49

## 2022-09-12 NOTE — PROGRESS NOTES
[facility] VISIT NOTE  Date: 2022    Name: Иван Deng. MRN: [id number]         : 1975    Mr. Carlos Ferris Arrived ambulatory and in no distress for C8D1 of Folfoxfiri Regimen. Assessment was completed by Radha Escamilla RN, no acute issues at this time, no new complaints voiced. Chest wall port accessed without difficulty by the assessment nurse, labs drawn & sent for processing. Do you have any symptoms of COVID-19? SOB, coughing, fever, or generally not feeling well NO    2. Have you been exposed to COVID-19 recently? NO    3. Have you had any recent contact with family/friend that has a pending COVID test? NO       Chemotherapy Flowsheet 2022   Cycle C8D1   Date 2022   Drug / Regimen Folfoxfiri   Post Hydration -   Pre Meds given   Notes given           Mr. Kamar Esquivel vitals were reviewed. Patient Vitals for the past 12 hrs:   Temp Pulse Resp BP SpO2   22 1613 97.9 °F (36.6 °C) 61 -- (!) 144/83 97 %   22 0825 98 °F (36.7 °C) 68 18 138/84 99 %         Lab results were obtained and reviewed. Recent Results (from the past 12 hour(s))   CBC WITH AUTOMATED DIFF    Collection Time: 22  8:40 AM   Result Value Ref Range    WBC 3.4 (L) 4.1 - 11.1 K/uL    RBC 3.52 (L) 4.10 - 5.70 M/uL    HGB 11.4 (L) 12.1 - 17.0 g/dL    HCT 34.8 (L) 36.6 - 50.3 %    MCV 98.9 80.0 - 99.0 FL    MCH 32.4 26.0 - 34.0 PG    MCHC 32.8 30.0 - 36.5 g/dL    RDW 18.1 (H) 11.5 - 14.5 %    PLATELET 93 (L) 839 - 400 K/uL    MPV 10.5 8.9 - 12.9 FL    NRBC 0.0 0  WBC    ABSOLUTE NRBC 0.00 0.00 - 0.01 K/uL    NEUTROPHILS 50 32 - 75 %    LYMPHOCYTES 30 12 - 49 %    MONOCYTES 11 5 - 13 %    EOSINOPHILS 8 (H) 0 - 7 %    BASOPHILS 1 0 - 1 %    IMMATURE GRANULOCYTES 0 0.0 - 0.5 %    ABS. NEUTROPHILS 1.7 (L) 1.8 - 8.0 K/UL    ABS. LYMPHOCYTES 1.0 0.8 - 3.5 K/UL    ABS. MONOCYTES 0.4 0.0 - 1.0 K/UL    ABS. EOSINOPHILS 0.3 0.0 - 0.4 K/UL    ABS. BASOPHILS 0.0 0.0 - 0.1 K/UL    ABS. IMM.  GRANS. 0.0 0.00 - 0.04 K/UL    DF SMEAR SCANNED      RBC COMMENTS ANISOCYTOSIS  1+       METABOLIC PANEL, COMPREHENSIVE    Collection Time: 09/12/22  8:40 AM   Result Value Ref Range    Sodium 141 136 - 145 mmol/L    Potassium 3.6 3.5 - 5.1 mmol/L    Chloride 111 (H) 97 - 108 mmol/L    CO2 25 21 - 32 mmol/L    Anion gap 5 5 - 15 mmol/L    Glucose 127 (H) 65 - 100 mg/dL    BUN 10 6 - 20 MG/DL    Creatinine 0.85 0.70 - 1.30 MG/DL    BUN/Creatinine ratio 12 12 - 20      GFR est AA >60 >60 ml/min/1.73m2    GFR est non-AA >60 >60 ml/min/1.73m2    Calcium 8.9 8.5 - 10.1 MG/DL    Bilirubin, total 0.3 0.2 - 1.0 MG/DL    ALT (SGPT) 39 12 - 78 U/L    AST (SGOT) 29 15 - 37 U/L    Alk.  phosphatase 232 (H) 45 - 117 U/L    Protein, total 6.4 6.4 - 8.2 g/dL    Albumin 2.9 (L) 3.5 - 5.0 g/dL    Globulin 3.5 2.0 - 4.0 g/dL    A-G Ratio 0.8 (L) 1.1 - 2.2         Medications received:  Medications Administered       dexamethasone (DECADRON) 12 mg in 0.9% sodium chloride 50 mL IVPB       Admin Date  09/12/2022 Action  New Bag Dose  12 mg Route  IntraVENous Administered By  Meryle Ores, RN              dextrose 5% infusion       Admin Date  09/12/2022 Action  New Bag Dose  25 mL/hr Rate  25 mL/hr Route  IntraVENous Administered By  Meryle Ores, RN              fluorouraciL (ADRUCIL) 4,080 mg in 0.9% sodium chloride 100 mL CADD Cassette       Admin Date  09/12/2022 Action  New Bag Dose  4,080 mg Rate  2.1 mL/hr Route  IntraVENous Administered By  Meryle Ores, RN              irinotecan (CAMPTOSAR) 281 mg in dextrose 5% 250 mL, overfill volume 25 mL chemo infusion       Admin Date  09/12/2022 Action  New Bag Dose  281 mg Rate  192.7 mL/hr Route  IntraVENous Administered By  Meryle Ores, RN              leucovorin (WELLCOVORIN) 680 mg in dextrose 5% 250 mL, overfill volume 25 mL IVPB       Admin Date  09/12/2022 Action  New Bag Dose  680 mg Rate  154.5 mL/hr Route  IntraVENous Administered By  Julietta Hodgkins Licha Ledbetter RN              oxaliplatin (ELOXATIN) 144.5 mg in dextrose 5% 250 mL, overfill volume 25 mL chemo infusion       Admin Date  09/12/2022 Action  New Bag Dose  144.5 mg Rate  152 mL/hr Route  IntraVENous Administered By  Leopold Deem, RN              palonosetron HCl (ALOXI) injection 0.25 mg       Admin Date  09/12/2022 Action  Given Dose  0.25 mg Route  IntraVENous Administered By  Leopold Deem, RN                     Two nurses verified prior to administering:    Drug name  Drug dose  Infusion volume or drug volume when prepared in a syringe  Rate of administration  Route of administration  Expiration dates and/or times  Appearance and physical integrity of the drugs  Rate set on infusion pump, when used  Sequencing of drug administration        Mr. Barbie Malcolm tolerated treatment well and was discharged from [place] [address] in stable condition at 1620. Port flushed and connected to infusing CADD pump per protocol. He is to return on  September 14, 2022 at 1330 for his next appointment.     Michelle Barr RN  September 12, 2022    Future Appointments:  Future Appointments   Date Time Provider Dahlia Epps   9/14/2022  1:30 PM SS INF6 CH4 <1H [facility]   9/26/2022  7:30 AM SS INF3 CH2 >7H [facility]   9/26/2022  8:45 AM Chris Rodriguez MD [facility]   9/28/2022  2:30 PM [facility]   10/10/2022  7:30 AM [facility]   10/11/2022  9:30 AM Ubaldo Montalvo MD PCS BS AMB   10/24/2022  7:30 AM SS INF2 CH2 >7H [facility]   10/26/2022  1:00 PM SS INF6 CH4 <1H [facility] Hiwot Calderon

## 2022-09-12 NOTE — PROGRESS NOTES
Elpidio Hashimoto is a 42 y.o. male follow up for  colon cancer, h/o lymphoma. 1. Have you been to the ER, urgent care clinic since your last visit? Hospitalized since your last visit?no     2. Have you seen or consulted any other health care providers outside of the [address] [place] [address] since your last visit? Include any pap smears or colon screening.  No    Vitals 9/12/2022   Blood Pressure 138/84   Pulse 68   Temp 98   Resp 18   Height 5' 7\"   Weight 136 lb 3.2 oz   SpO2 99   BSA 1.71 m2   BMI 21.33 kg/m2   BP comment

## 2022-09-14 ENCOUNTER — HOSPITAL ENCOUNTER (OUTPATIENT)
Dept: INFUSION THERAPY | Age: 47
Discharge: HOME | End: 2022-09-14
Payer: MEDICAID

## 2022-09-14 VITALS
SYSTOLIC BLOOD PRESSURE: 132 MMHG | RESPIRATION RATE: 16 BRPM | HEART RATE: 62 BPM | OXYGEN SATURATION: 99 % | TEMPERATURE: 96.6 F | DIASTOLIC BLOOD PRESSURE: 76 MMHG

## 2022-09-14 DIAGNOSIS — T45.1X5A CHEMOTHERAPY INDUCED NEUTROPENIA (HCC): Primary | ICD-10-CM

## 2022-09-14 DIAGNOSIS — Z76.89 PREVENTION OF CHEMOTHERAPY-INDUCED NEUTROPENIA: ICD-10-CM

## 2022-09-14 DIAGNOSIS — C18.9 COLON ADENOCARCINOMA (HCC): ICD-10-CM

## 2022-09-14 DIAGNOSIS — C82.90 FOLLICULAR LYMPHOMA, UNSPECIFIED FOLLICULAR LYMPHOMA TYPE, UNSPECIFIED BODY REGION (HCC): ICD-10-CM

## 2022-09-14 DIAGNOSIS — D70.1 CHEMOTHERAPY INDUCED NEUTROPENIA (HCC): Primary | ICD-10-CM

## 2022-09-14 PROCEDURE — 96523 IRRIG DRUG DELIVERY DEVICE: CPT

## 2022-09-14 RX ORDER — SODIUM CHLORIDE 9 MG/ML
10 INJECTION INTRAMUSCULAR; INTRAVENOUS; SUBCUTANEOUS AS NEEDED
Status: DISCONTINUED | OUTPATIENT
Start: 2022-09-14 | End: 2022-09-15 | Stop reason: HOSPADM

## 2022-09-14 RX ORDER — SODIUM CHLORIDE 0.9 % (FLUSH) 0.9 %
10 SYRINGE (ML) INJECTION AS NEEDED
Status: DISCONTINUED | OUTPATIENT
Start: 2022-09-14 | End: 2022-09-15 | Stop reason: HOSPADM

## 2022-09-14 RX ORDER — HEPARIN 100 UNIT/ML
300-500 SYRINGE INTRAVENOUS AS NEEDED
Status: DISCONTINUED | OUTPATIENT
Start: 2022-09-14 | End: 2022-09-15 | Stop reason: HOSPADM

## 2022-09-14 NOTE — PROGRESS NOTES
[facility] VISIT NOTE  Date: 2022    Name: Anand Moseley. MRN: [id number]         : 1975      Pt arrived at [facility] ambulatory and in no distress for pump disconnect. No new complaints voiced. Patient denied having any symptoms of COVID-19, i.e. SOB, coughing, fever, or generally not feeling well. Also denies having been exposed to COVID-19 recently or having had any recent contact with family/friend that has a pending COVID test.     Visit Vitals  /76   Pulse 62   Temp (!) 96.6 °F (35.9 °C)   Resp 16   SpO2 99%       All chemo contents infused, pump completed at 1550. Mr. Darinel Lockhart tolerated treatment well and was discharged from [place] [address] in stable condition. Port de-accessed, flushed & heparinized per protocol. He is aware of his next appointment.      Skinny Weinstein RN  2022    Future Appointments:  Future Appointments   Date Time Provider Dahlia pEps   2022  7[facility]   2022  8:45 AM Yahir Bardales MD [facility] BS AMB   2022  3:30 PM [facility] CT 1 SFMR[facility]   2022  2:30 [facility]   10/10/2022  7:30 AM [facility]   10/11/2022  9:30 AM Marcel Willett MD PCS BS AMB   10/24/2022  7:30 [facility]   10/26/2022  1:00 PM SS [facility]6 [facility]

## 2022-09-16 ENCOUNTER — TELEPHONE (OUTPATIENT)
Dept: PALLATIVE CARE | Age: 47
End: 2022-09-16

## 2022-09-16 DIAGNOSIS — C78.6 PERITONEAL CARCINOMATOSIS (HCC): ICD-10-CM

## 2022-09-16 DIAGNOSIS — C18.9 COLON ADENOCARCINOMA (HCC): ICD-10-CM

## 2022-09-16 DIAGNOSIS — R10.84 ABDOMINAL PAIN, GENERALIZED: ICD-10-CM

## 2022-09-16 RX ORDER — EPINEPHRINE 1 MG/ML
0.3 INJECTION, SOLUTION, CONCENTRATE INTRAVENOUS AS NEEDED
Status: CANCELLED | OUTPATIENT
Start: 2022-09-26

## 2022-09-16 RX ORDER — SODIUM CHLORIDE 9 MG/ML
25 INJECTION, SOLUTION INTRAVENOUS CONTINUOUS
Status: CANCELLED
Start: 2022-09-26

## 2022-09-16 RX ORDER — ATROPINE SULFATE 0.4 MG/ML
0.4 INJECTION, SOLUTION ENDOTRACHEAL; INTRAMEDULLARY; INTRAMUSCULAR; INTRAVENOUS; SUBCUTANEOUS
Status: CANCELLED | OUTPATIENT
Start: 2022-09-26

## 2022-09-16 RX ORDER — SODIUM CHLORIDE 9 MG/ML
10 INJECTION INTRAMUSCULAR; INTRAVENOUS; SUBCUTANEOUS AS NEEDED
Status: CANCELLED | OUTPATIENT
Start: 2022-09-26

## 2022-09-16 RX ORDER — HEPARIN 100 UNIT/ML
300-500 SYRINGE INTRAVENOUS AS NEEDED
Status: CANCELLED
Start: 2022-10-05

## 2022-09-16 RX ORDER — SODIUM CHLORIDE 0.9 % (FLUSH) 0.9 %
10 SYRINGE (ML) INJECTION AS NEEDED
Status: CANCELLED | OUTPATIENT
Start: 2022-10-05

## 2022-09-16 RX ORDER — SODIUM CHLORIDE 9 MG/ML
10 INJECTION INTRAMUSCULAR; INTRAVENOUS; SUBCUTANEOUS AS NEEDED
Status: CANCELLED | OUTPATIENT
Start: 2022-10-05

## 2022-09-16 RX ORDER — DIPHENHYDRAMINE HYDROCHLORIDE 50 MG/ML
25 INJECTION, SOLUTION INTRAMUSCULAR; INTRAVENOUS AS NEEDED
Status: CANCELLED
Start: 2022-09-26

## 2022-09-16 RX ORDER — ACETAMINOPHEN 325 MG/1
650 TABLET ORAL AS NEEDED
Status: CANCELLED
Start: 2022-09-26

## 2022-09-16 RX ORDER — DEXTROSE MONOHYDRATE 50 MG/ML
25 INJECTION, SOLUTION INTRAVENOUS CONTINUOUS
Status: CANCELLED
Start: 2022-09-26

## 2022-09-16 RX ORDER — ONDANSETRON 2 MG/ML
8 INJECTION INTRAMUSCULAR; INTRAVENOUS AS NEEDED
Status: CANCELLED | OUTPATIENT
Start: 2022-09-26

## 2022-09-16 RX ORDER — SODIUM CHLORIDE 0.9 % (FLUSH) 0.9 %
10 SYRINGE (ML) INJECTION AS NEEDED
Status: CANCELLED | OUTPATIENT
Start: 2022-09-26

## 2022-09-16 RX ORDER — DIPHENHYDRAMINE HYDROCHLORIDE 50 MG/ML
50 INJECTION, SOLUTION INTRAMUSCULAR; INTRAVENOUS AS NEEDED
Status: CANCELLED
Start: 2022-09-26

## 2022-09-16 RX ORDER — OXYCODONE HYDROCHLORIDE 10 MG/1
10 TABLET ORAL
Qty: 90 TABLET | Refills: 0 | Status: SHIPPED | OUTPATIENT
Start: 2022-09-21 | End: 2022-09-29 | Stop reason: SDUPTHER

## 2022-09-16 RX ORDER — ALBUTEROL SULFATE 0.83 MG/ML
2.5 SOLUTION RESPIRATORY (INHALATION) AS NEEDED
Status: CANCELLED
Start: 2022-09-26

## 2022-09-16 RX ORDER — HYDROCORTISONE SODIUM SUCCINATE 100 MG/2ML
100 INJECTION, POWDER, FOR SOLUTION INTRAMUSCULAR; INTRAVENOUS AS NEEDED
Status: CANCELLED | OUTPATIENT
Start: 2022-09-26

## 2022-09-16 RX ORDER — PALONOSETRON 0.05 MG/ML
0.25 INJECTION, SOLUTION INTRAVENOUS ONCE
Status: CANCELLED | OUTPATIENT
Start: 2022-09-26 | End: 2022-09-26

## 2022-09-16 RX ORDER — HEPARIN 100 UNIT/ML
300-500 SYRINGE INTRAVENOUS AS NEEDED
Status: CANCELLED
Start: 2022-09-26

## 2022-09-16 NOTE — TELEPHONE ENCOUNTER
Received call from Liliana Ramos requesting Oxycodone refill. Patient has 35 pills remaining.  To be called in to [facility] pharmacy

## 2022-09-16 NOTE — TELEPHONE ENCOUNTER
Triage for Controlled Substance Refill Request    Pain Diagnosis: _Colon adenocarcinoma ([facility] [address] (C18.9); Abdominal pain, generalized (R10.84); Peritoneal carcinomatosis ([facility] [address] (C78.6)    Last Outpatient Visit: _8/22/2022    Next Outpatient Visit: _10/11/2022    Reason for refill needed outside of office visit? Appointment not scheduled prior to need for scheduled refill      Pharmacy: _[facility]/pharmacy #[id number] [place] [address]     Medication:oxyCODONE IR (ROXICODONE) 10 mg tab immediate release tablet     Dose and directions: Take 1 Tablet by mouth every four (4) hours as needed for Pain for up to 15 days  Number dispensed:90  Date filled ( or Pharmacy):9/6/2022  #left:     reviewed: _yes    Date of Urine Drug Screen:  _8/22/2022    Opioid Safety Handout given:  _yes    Appropriate for refill:  _yes    Action:  _ Medication pending

## 2022-09-19 ENCOUNTER — APPOINTMENT (OUTPATIENT)
Dept: INFUSION THERAPY | Age: 47
End: 2022-09-19

## 2022-09-21 ENCOUNTER — APPOINTMENT (OUTPATIENT)
Dept: INFUSION THERAPY | Age: 47
End: 2022-09-21

## 2022-09-23 DIAGNOSIS — R10.84 ABDOMINAL PAIN, GENERALIZED: ICD-10-CM

## 2022-09-23 DIAGNOSIS — C78.6 PERITONEAL CARCINOMATOSIS (HCC): ICD-10-CM

## 2022-09-23 DIAGNOSIS — C18.9 COLON ADENOCARCINOMA (HCC): ICD-10-CM

## 2022-09-23 RX ORDER — OXYCODONE HCL 10 MG/1
10 TABLET, FILM COATED, EXTENDED RELEASE ORAL EVERY 12 HOURS
Qty: 60 TABLET | Refills: 0 | Status: SHIPPED | OUTPATIENT
Start: 2022-09-28 | End: 2022-10-28

## 2022-09-23 NOTE — TELEPHONE ENCOUNTER
Palliative Medicine  Nursing Note  [phone])  Fax [phone]      Telephone Call  Patient Name: Valdemar Frankel. YOB: [birth year]        Primary Decision Maker: Mady Guerrier - Girlfriend - [phone]   Advance Care Planning 8/22/2022   Patient's Healthcare Decision Maker is: Legal Next of Kin   Confirm Advance Directive None   Patient Would Like to Complete Advance Directive -   Does the patient have other document types -     Incoming call from patient requesting refill on Oxycodone IR 10mg. Per  this was picked up on 9/21/22 for #90. His Oxycontin 10mg was last filled on 8/29/22, which would be due for a refill on 9/28/22. Pended the Oxycontin for him for 9/28/22    Call placed to Mr. Carey See to clarify but had to leave voice mail message regarding medication question.     Triage for Controlled Substance Refill Request    Pain Diagnosis: Colon adenocarcinoma    Last Outpatient Visit: 8/22/22    Next Outpatient Visit: 10/11/22    Reason for refill needed outside of office visit- Appointment not scheduled prior to need for scheduled refill,     Any Reported Side effects: none    Last dose taken:     Pharmacy: [facility]    Medication: Oxycontin 10mg   Dose and directions: 1 every 12 hours   Number dispensed: 60  Date filled ( or Pharmacy): 8/29/22  # left:       reviewed: 9/23/22    Date of Urine Drug Screen:      Opioid Safety Handout given:  yes    Appropriate for refill:  on 9/28/22    Action:  pended for Dr. Michelle Ayala, [address] [place] [address]  Palliative Medicine

## 2022-09-23 NOTE — TELEPHONE ENCOUNTER
Received call from patient requesting Oxycodone IR 10 mg refill. To be called in to [facility] pharmacy.

## 2022-09-23 NOTE — PROGRESS NOTES
[id number] [facility] at [facility]  [phone]    Hematology / Oncology Established Visit    Reason for Visit:   Иван Gabriel is a 41 y.o. male who is seen for urgent follow up of colon cancer, h/o lymphoma. Hematology Oncology Treatment History:     Diagnosis #1: Follicular lymphoma  Stage: IV  Pathology:   11/13/18 right inguinal LN excision: Follicular lymphoma, high-grade (grade 3a of 3). Prior Treatment:   1. Obinutuzumab-CHOP. Obinutuzumab: 1000 mg weekly on days 1, 8, 15 for cycle 1, then 1000 mg on day 1 q21 days for cycles 2-6, then monotherapy 1000 mg every 21 days for cycle 7, 8 with Cyclophosphamide 750mg/m2, Doxorubicin 50mg/m2, Vincristine 1.4mg/m2 on day 1 and Prednisone 100mg on Days 1-5, every 21 days for a total of 2 cycles completed late 1/2019. Regimen discontinued due to STEMI. 2. Obinutuzumab + Bendamustine: 1000 mg Obinutuzumab on day 1 + Bendamustine 90mg/m2 on days 1-2 on a 28-day cycle x 4 cycles, completed 5/2019  Current Treatment: Surveillance      Diagnosis #2: Colon cancer:  Stage: IV  Pathology:  Ascending colon; biopsy: poorly differentiated carcinoma  Comment: No dysplasia is identified. Immunohistochemical stains performed on block 1A, show the tumor positive for Cam5.2 and shows patchy positivity for CDX2 with a Ki-67 index of -90%; the tumor is focally positive for CK5/6 and GATA3; negative for p63, Pax8, CK7, CK20, panmelanoma, synaptophysin and chromogranin. The immunophenotypic features are nonspecific but argues somewhat against the following carcinoma: urothelial, neuroendocrine and breast. The immunophenotypic features also argues against melanoma and somewhat against classic colorectal carcinoma. Additional immunohistochemical stains to exclude the possibility of prostate and hepatocellular carcinoma have been   ordered; the results will be reported as an addendum.   MLH1/MSH2/MSH6/PMS2 all intact with no loss of nuclear expression of MMR proteins - no MSI   Addendum: The addendum is issued to report the results of additional immunohistochemical stains in an attempt to ascertain the primary site of the carcinoma. The diagnosis is unchanged. Hepar and glypican 3 immunohistochemical stains are neg, arguing against hepatocellular carcinoma. PSA stain is negative, arguing against prostatic primary. Imaging studies to eval for poss primary sites maybe useful. In the absence of carcinoma/tumor at any other sites, this may represent an undifferentiated carcinoma of the colon. Oncogenomics (molecular) studies: POLE< ARID1A, YVONNE, ATR, BRCA2, CHECK1, FANCI, NF1, PIK3CA, PTCH1, PTEN, RAD50, RAD51, RB1, SMARCA4, STK11    Prior Treatment:   1. Loop ileostomy 2/19/22  2. Diagnotic laparoscopy with peritoneal biopsy, 4/27/22    Current Treatment: FOLFOXIRI every 2 weeks until disease progression or toxicity, 5/16/22 - current      Oncologic History:  Was in his usual state of health in early November 2018 when he developed low back pain and presented to the ER. Work-up at outside hospital revealed a retroperitoneal mass seen on CT imaging, and he was transferred to [facility] for further work-up. CT there showed a large retroperitoneal mass encircling the aorta with invasion of the left renal hilum and left adrenal gland. There were bilateral inguinal lymph nodes and moderate left hydronephrosis. He was evaluated while at [facility] and was noted to have palpable nodes in his groin for approximately the past 1 month. He underwent excisional LN biopsy of right inguinal LN, which revealed follicular lymphoma. At time of diagnosis, he had no cardiac disease aside from HTN, hyperlipidemia. However, he did have an NSTEMI after cycle 2 of O-CHOP. Was likely unrelated to chemotherapy, but opted to switch treatment to Obinutuzumab-Bendamustine. He completed treatment in 5/2019 and had a CR based on PET.     History of Present Illness:   Mr. Kadi Carnes is a 41 y.o. male with prior h/o follicular lymphoma is seen for follow up of colon cancer and C9 of treatment. Has new pain in R shoulder and L thigh, which seems muscular in nature. No fevers, chills. Had nausea this morning, but none otherwise. No vomiting or diarrhea. Cannot go for CT scan today and he plans to reschedule this. PMHx: Lymphoma in 2018, CAD, HTN, Hyperlipidemia, Anxiety, chronic low back pain  PSurgHx: None aside from cardiac stent placement and port placement  SHx: Smokes 1/2ppd x past 20 yrs. Works part time as a cook. Has 2 children. FHx: Father had leukemia, passed away from pancreatic cancer. Review of Systems: A complete review of systems was obtained, negative except as described above. Physical Exam:     Visit Vitals  /86   Pulse 85   Temp 97.9 °F (36.6 °C)   Resp 16   Ht 5' 7\" (1.702 m)   Wt 141 lb (64 kg)   SpO2 100%   BMI 22.08 kg/m²         ECOG PS: 0  General: no distress  Eyes: anicteric sclerae  HENT: oropharynx clear  Neck: supple  Lymphatic: no cervical, supraclavicular adenopathy  Respiratory: CTAB, normal respiratory effort  CV: no peripheral edema, port upper chest   GI: soft, nontender, nondistended, no masses;  colostomy in place. Skin: no rashes; no ecchymoses; no petechiae      Results:     Lab Results   Component Value Date/Time    WBC 2.6 (L) 09/26/2022 08:08 AM    HGB 11.6 (L) 09/26/2022 08:08 AM    HCT 35.1 (L) 09/26/2022 08:08 AM    PLATELET 64 (L) 28/10/1221 08:08 AM    MCV 99.7 (H) 09/26/2022 08:08 AM    ABS.  NEUTROPHILS PENDING 09/26/2022 08:08 AM    Hemoglobin (POC) 15.0 06/05/2009 02:13 PM    Hematocrit (POC) 39 02/14/2019 01:24 PM     Lab Results   Component Value Date/Time    Sodium 141 09/12/2022 08:40 AM    Potassium 3.6 09/12/2022 08:40 AM    Chloride 111 (H) 09/12/2022 08:40 AM    CO2 25 09/12/2022 08:40 AM    Glucose 127 (H) 09/12/2022 08:40 AM    BUN 10 09/12/2022 08:40 AM    Creatinine 0.85 09/12/2022 08:40 AM    GFR est AA >60 09/12/2022 08:40 AM    GFR est non-AA >60 2022 08:40 AM    Calcium 8.9 2022 08:40 AM    Sodium (POC) 136 2019 01:24 PM    Potassium (POC) 3.9 2019 01:24 PM    Chloride (POC) 102 2019 01:24 PM    Glucose (POC) 249 (H) 02/15/2019 10:21 PM    BUN (POC) 14 2019 01:24 PM    Creatinine (POC) 0.9 2019 01:24 PM    Calcium, ionized (POC) 1.24 2019 01:24 PM     Lab Results   Component Value Date/Time    Bilirubin, total 0.3 2022 08:40 AM    ALT (SGPT) 39 2022 08:40 AM    Alk. phosphatase 232 (H) 2022 08:40 AM    Protein, total 6.4 2022 08:40 AM    Albumin 2.9 (L) 2022 08:40 AM    Globulin 3.5 2022 08:40 AM     Lab Results   Component Value Date/Time    Iron 18 (L) 2022 11:13 AM    TIBC 173 (L) 2022 11:13 AM    Iron % saturation 10 (L) 2022 11:13 AM    Ferritin 426 (H) 2022 11:13 AM       No results found for: B12LT, FOL, RBCF  Lab Results   Component Value Date/Time    TSH 1.53 2016 04:40 AM       18:       Labs at [facility]:  22: CA 19-9 = 390  22: CEA = 12.3  22: CEA = 19.2  22: CEA = 17.9   22: CEA = 6.0    Signatera MRD:  22: 295.97 MTM/mL  22: 2.98  22: 0.88     Imagin/9/18 Abd/pelvis CT: IMPRESSION:  1. Interval development of a large retroperitoneal mass encircling the aorta with invasion of the left renal hilum and left adrenal gland. Several adjacent lymph nodes are seen extending into the peritoneum and underneath the  diaphragmatic natalie. This most likely represents lymphoma. 2. Several new bilateral enlarged inguinal lymph nodes also likely representing lymphoma. 3. Moderate left hydronephrosis with a delayed renal nephrogram related to decreased renal function. This is related to the invasion of the renal hilum. 18 Chest CT: IMPRESSION:  Trace left pleural effusion. Bilateral lower lobe atelectasis.  Large  retroperitoneal mass lesion again demonstrated. PET 12/18/18:  FINDINGS:  HEAD/NECK: Right palatine tonsil intense hypermetabolism, max SUV 18. Multilevel  bilateral cervical adenopathy, with max SUV 12 in a left supraclavicular node. Cerebral evaluation is limited by normal intense activity. CHEST: Solitary hypermetabolic left axillary node, max SUV 11. ABDOMEN/PELVIS: Bulky retroperitoneal mass max SUV 27, with several additional  small active abdomino-pelvic nodes. Bilateral inguinal nodes with max SUV 12 on  the left. SKELETON: No foci of abnormal hypermetabolism in the axial and visualized  appendicular skeleton. IMPRESSION:   1. Right palatine tonsil tumor involvement (Deauville 5). 2. Bilateral cervical delaney involvement (Deauville 5). 3. Left axillary node involvement (Deauville 5). 4. Bulky retroperitoneal lymphoma mass and additional smaller hypermetabolic  abdomino-pelvic nodes (Deauville 5). 5. Bilateral inguinal delaney involvement (Deauville 5). Deauville Five Point Scale  1. No uptake or no residual uptake (when used interim)  2. Slight uptake, but below blood pool (mediastinum)  3. Uptake above mediastinal, but below/equal to uptake in the liver  4. Uptake slightly to moderately higher than liver  5. Markedly increased uptake or any new lesion (on response evaluation)  Each FDG-avid (or previously FDG avid) lesion is rated independently. Reference values:  Mediastinal blood pool: 2.1 SUV  Liver (background): 2.2 SUV    PET/CT 2/05/19:   IMPRESSION:  1. No Foci of Abnormal Hypermetabolism (Deauville 1). 2. Resolved activity in the right palatine tonsil, bilateral cervical nodes,left axillary node, retroperitoneal/abdominal pelvic adenopathy, bilateral inguinal nodes. Echo 2/14/19:  Normal cavity size, wall thickness and systolic function (ejection fraction normal). The muscle mass is normal. The cavity shape is normal. The estimated ejection fraction is 41 - 45%.  Abnormal wall motion as described on the wall scoring diagram below. End-systolic volume is normal. Normal left ventricular strain. There is mild (grade 1) left ventricular diastolic dysfunction. Normal left ventricular diastolic pressure. End-diastolic volume is normal.    LE arterial duplex 2/22/19:  There is evidence of left groin pseudoaneurysm noted arising from distal common femoral artery, pseudo lobe measures 2.32cm x 2.58cm and pseudo neck length measuring 0.63cm. There is no evidence of hemodynamically significant left lower extremity arterial obstruction. JACLYN is 1.03 on the right and 1.02 on the left. LE arterial duplex s/p Thrombin Injection to Pseudoaneurysm 2/26/19:  Successful thrombin injection procedure of the left groin with no further flow seen. No evidence of hemodnyamically significant obstruction in the left lower extremity. Left lower extremity arterial duplex performed. Confirmed pseudoaneurysm in left groin with small neck. Following thrombin injection, no further flow seen in the pseudoaneurysm. The left common femoral, profunda femoral, femoral, popliteal, posterior tibial and anterior arteries were imaged. Mainly triphasic flow was seen with no evidence of significantly elevated velocities. Repeat LE arterial duplex 2/27/19:  Continued thrombosed left groin pseudoaneurysm following thrombin injection on 02/26/2019. No flow or color fill is identified. The hematoma measures approximately 2.1 x 2.9 cm in diameter. The common femoral, deep femoral, femoral, and popliteal arteries are patent with mainly tri-phasic flow and no significant hemodynamically obstruction is noted. Stress 5/31/19:  Normal stress myocardial perfusion without ischemia or infact at 84% MPHR. Normal LV function. LVEF 60%. No EKG changes of ischemia at peak exercise. Normal functional capacity. PET 6/03/19: IMPRESSION: No Foci of Abnormal Hypermetabolism (Deauville 1).     -MRI lumbar spine 12/18/19:  Mild disc degenerative change at L3-4 and L4-5.  Mild canal stenosis at L3-4 and mild left foraminal stenosis at L4-5. Other less severe degenerative findings are as described above. Continued diminished size of retroperitoneal mass-adenopathy,  with diminished soft tissue density at the left renal hilum. -CT chest/abdomen 12/16/19:  Findings are consistent with interval response to therapy    CT c/a/p 5/28/20: IMPRESSION:  Further contraction of the retroperitoneal mantle and chris mesentery,  compatible with treatment response  Stable left hilar soft tissue mass  Slight increased splaying of the duodenum and proximal jejunum is the result, without obstruction  No evidence for recurrence of lymphoma in the chest, abdomen, or pelvis    CT c/a/p 2/13/22:  FINDINGS:   LOWER THORAX: Dependent atelectasis with otherwise clear lungs. The visualized  heart is normal in size without pericardial effusion. LIVER: No mass. BILIARY TREE: Gallbladder is unremarkable. CBD is not dilated. SPLEEN: Unremarkable. PANCREAS: No mass or ductal dilatation. ADRENALS: Unremarkable. KIDNEYS: Atrophic left kidney with mild left hydronephrosis. Right kidney is  unremarkable with no stone, enhancing mass, or other renal abnormality. STOMACH: Unremarkable. SMALL BOWEL: No dilatation or wall thickening. COLON: Circumferential wall thickening at the hepatic flexure with luminal  narrowing, adjacent mesenteric stranding, and fluid with upstream retention in  the cecum and fecalization of distal ileal contents. No dilation or other wall  thickening. APPENDIX: Unremarkable. PERITONEUM: Moderate free fluid with no pneumoperitoneum. RETROPERITONEUM: Soft tissue stranding around the celiac and SMA and associated aorta with no discrete mass or adenopathy. Aorta is normal in size without aneurysm or dissection. REPRODUCTIVE ORGANS: Prostate and seminal vesicles appear unremarkable. URINARY BLADDER: No mass or calculus. BONES: No destructive bone lesion.   ABDOMINAL WALL: No mass or hernia. ADDITIONAL COMMENTS: N/A  IMPRESSION  1. Annular mass lesion of the right colon with upstream fecal retention  concerning for primary colon neoplasm versus less likely lymphoma. 2. Soft tissue stranding around celiac axis, SMA, and abdominal aorta may  reflect infiltrative lymphoma. 3. Left renal atrophy with left hydronephrosis likely reflecting chronic  proximal ureteral obstruction. 4. Incidentals as above. 2/24/22 CT abd/pelvis at [facility]:  FINDINGS: An enteric tube with sidehole beyond the level of the lower esophageal sphincter is seen looping on itself in the proximal stomach before terminating in the mid stomach. Status post right lower quadrant diverting ileostomy for an obstructing colonic mass. Multiple loops of gas dilated small bowel remain, with a maximal diameter of 5.4 cm whereas previously measured 3.6 cm. Dilute contrast is seen within the lateral left abdominal dilated small bowel. Moderate stool burden and bowel gas opacifies the cecum, measuring 7.3 cm in diameter. No definite supine evidence of pneumoperitoneum. Lung bases: Limited evaluation of the lung bases demonstrates a cardiac silhouette within normal limits for size. A small bore central venous catheter is seen terminating in the right atrium. No focal consolidation or pleural effusion. 2/24/22 CT abd/pelvis [facility]:  IMPRESSION:  1. Status post right lower quadrant loop ileostomy. Mild diffuse dilation of small bowel proximal to the ostomy in the lower abdomen and upper pelvis concerning for at least mild to moderate partial bowel obstruction. Relatively decompressed loop ileostomy. 2. Mildly complex pelvic fluid collection in the rectovesical space, decreased in size from prior. 3. Redemonstrated ascending colon mass and findings suspicious for regional metastatic disease. 4. Redemonstrated soft tissue in the retroperitoneum with prominent lymph nodes, similar to prior examination.  Differential would include metastasis, retroperitoneal fibrosis. 5. Mild atherosclerosis. 6. Subcentimeter right renal hypodensity, too small to characterize. 7. Severe compression of the left renal vein, similar to prior examination. 8. Additional findings as described above. Bones: No acute osseous abnormality. 2/24/22 CT chest [facility]:  IMPRESSION:  FINAL report. 1. No evidence of metastatic disease to the chest.  2. No enlarged lymph nodes. 3. Increasing volume loss in the lung bases which may be due to atelectasis. 4. CT abdomen and pelvis reported separately. 2/25/22 Colonoscopy at [facility]:  Impression:            - Preparation of the colon was inadequate. - Stool in the entire examined colon. - Likely malignant completely obstructing tumor at the                         hepatic flexure. Biopsied.                        - Malignant-appearing tumor in the colon. Complications:         No immediate complications. 7/19/22 CT ch/abd/pelv:  IMPRESSION  Response to treatment characterized by decrease in size of the apical core  lesion in the proximal transverse colon and decreased mucosal colic  lymphadenopathy. Persistent mesenteric lymphadenopathy and retroperitoneal/mesenteric soft tissue  which may relate to the patient's history of lymphoma. Chronic left renal atrophy with chronic left hydronephrosis. RECIST:  Target lesions:  Transverse colon mass, series 2, image 75, 27 x 26 mm, previously 49 x 45 mm. Nontarget lesions:  Transverse mesocolic lymph nodes, decreased. Assessment & Plan:   Fredie Hatchet. is a 41 y.o. male comes in for evaluation and management of lymphoma. 1. Undifferentiated carcinoma of transverse colon, metastatic, KRAS/NRAS status unclear:  S/p diverting ileostomy due to large bowel obstruction. Colonoscopy with biopsy on 2/25/22.  No obvious metastatic disease on CT imaging, but did have obvious metastatic disease to peritoneum during laparoscopy with Dr. Jose David Damon. I recommended FOLFOXIRI every 2 weeks. Will not give Bevacizumab given h/o MRI and tobacco use. [employer] suggests: BRCA2 and YVONNE mutations support use of Olaparib or similar PARP inhibitor in future. They also suggest testing for TMB, MSI, PDL1 to determine utility of checkpoint inhibitors. CT after 4 cycles of treatment shows response to treatment with decrease size in transverse colon mass on 7/19/22. Supportive medications: zofran, compazine, dexamethasone, EMLA topical  -- Hold treatment today given thrombocytopenia. Dose-reducing Irinotecan by 10% to avoid cytopenias/treatment delays. -- Repeat Signatera testing showed continued decline. Repeat in 10/2022. Looking into adding [employer] or alternate NGS testing to determine TMB, MSI, PDL1, KRAS/NRAS/BRAF. -- Check CEA every 8 weeks  -- Consider repeat colonoscopy in 4-6 months given incomplete colonoscopy. -- Repeat CT due now - pt was asked to get this done this week. CT after every 4 cycles. -- Return in 1 week for retry of C9 of FOLFOXIRI     2. H/o Follicular lymphoma:   Grade 3a with with bulky disease encircling the aorta, causing pain. Bone marrow negative for lymphoma, but was hypercellular. BR better than RCHOP, but based on GALLIUM study, Obinutuzumab-based induction and maintenance prolongs PFS over that seen with rituximab-based therapy. Therefore, pt started on O-CHOP regimen. He received 2 cycles with CR and was then switched to BR x 4 cycles given STEMI. Completed treatment 5/2019. PET completed 6/3/19 showed CR. CT 5/28/20 negative for disease. No extra surveillance needed at this time given metastatic colon cancer with frequent imaging. 3. Chronic lumbar back pain / anxiety / RLQ / insomnia:   Left lower back pain is chronic/stable and RLQ is likely related to neoplasm/colostomy - currently managing pain on Oxycontin 10mg prn, and Lexapro daily.   Signed pain contract on 12/28/18 and following with Dr. Cristian Castorena. Trazodone, melatonin not helping insomnia. Reviewed sleep hygiene. 4. CAD / HTN:   h/o STEMI 2/14/29 with TOM placed to proximal LAD. Following with Dr. Khalida Jimenez and remains on dual antiplatelet therapy, high dose Lipitor, Metoprolol, ACEI. Received only 2 doses of cardiotoxic chemo, Doxorubicin in early 1/2019. Is overdue for follow up with Dr. Khalida Jimenez. 5. Tobacco abuse:   Discussed benefits of quitting smoking again. Previously discussed replacing hand-to-mouth habit with another item such as Twizzlers or SlimJims. 6. BRCA2 mutation:  Will discuss with patient in future. He would benefit from genetic counseling for himself and his family. 7. Weight-loss/ fatigue:   Improving. Encouraged supplementing with 2 boost/ensures daily in addition to meals and 60 oz water daily and advised scheduled antiemetics on days 4-6.     8. H/o chemotherapy induced neutropenia:  Neulasta added with C3 forward. Advised neutropenic precautions. Held with C5 due to insurance authorization. -- Consider adding Ziextendo if persistent neutropenia. 9. Chemotherapy induced thrombocytopenia:   Holding treatment today given PLT < 75. Monitor for bleeding. Goals of care: Disease is not curable, but is treatable to improve quality and duration of life. I personally provided the service today.      Signed By: Dahlia Camarena MD

## 2022-09-26 ENCOUNTER — HOSPITAL ENCOUNTER (OUTPATIENT)
Dept: INFUSION THERAPY | Age: 47
Discharge: HOME | End: 2022-09-26
Payer: MEDICAID

## 2022-09-26 ENCOUNTER — OFFICE VISIT (OUTPATIENT)
Dept: ONCOLOGY | Age: 47
End: 2022-09-26
Payer: MEDICAID

## 2022-09-26 VITALS
RESPIRATION RATE: 16 BRPM | BODY MASS INDEX: 22.13 KG/M2 | HEART RATE: 85 BPM | DIASTOLIC BLOOD PRESSURE: 86 MMHG | HEIGHT: 67 IN | OXYGEN SATURATION: 100 % | WEIGHT: 141 LBS | TEMPERATURE: 97.9 F | SYSTOLIC BLOOD PRESSURE: 139 MMHG

## 2022-09-26 VITALS
DIASTOLIC BLOOD PRESSURE: 86 MMHG | RESPIRATION RATE: 16 BRPM | SYSTOLIC BLOOD PRESSURE: 139 MMHG | HEIGHT: 67 IN | TEMPERATURE: 97.9 F | OXYGEN SATURATION: 100 % | HEART RATE: 85 BPM | BODY MASS INDEX: 22.18 KG/M2 | WEIGHT: 141.3 LBS

## 2022-09-26 DIAGNOSIS — Z51.11 CHEMOTHERAPY MANAGEMENT, ENCOUNTER FOR: ICD-10-CM

## 2022-09-26 DIAGNOSIS — R10.31 RLQ ABDOMINAL PAIN: ICD-10-CM

## 2022-09-26 DIAGNOSIS — C18.9 COLON ADENOCARCINOMA (HCC): ICD-10-CM

## 2022-09-26 DIAGNOSIS — I25.10 CORONARY ARTERY DISEASE INVOLVING NATIVE CORONARY ARTERY OF NATIVE HEART, UNSPECIFIED WHETHER ANGINA PRESENT: ICD-10-CM

## 2022-09-26 DIAGNOSIS — D70.1 CHEMOTHERAPY INDUCED NEUTROPENIA (HCC): Primary | ICD-10-CM

## 2022-09-26 DIAGNOSIS — C82.90 FOLLICULAR LYMPHOMA, UNSPECIFIED FOLLICULAR LYMPHOMA TYPE, UNSPECIFIED BODY REGION (HCC): ICD-10-CM

## 2022-09-26 DIAGNOSIS — C18.4 CARCINOMA OF TRANSVERSE COLON (HCC): Primary | ICD-10-CM

## 2022-09-26 DIAGNOSIS — T45.1X5A CHEMOTHERAPY-INDUCED THROMBOCYTOPENIA: ICD-10-CM

## 2022-09-26 DIAGNOSIS — T45.1X5A CHEMOTHERAPY INDUCED NEUTROPENIA (HCC): Primary | ICD-10-CM

## 2022-09-26 DIAGNOSIS — Z76.89 PREVENTION OF CHEMOTHERAPY-INDUCED NEUTROPENIA: ICD-10-CM

## 2022-09-26 DIAGNOSIS — D69.59 CHEMOTHERAPY-INDUCED THROMBOCYTOPENIA: ICD-10-CM

## 2022-09-26 LAB
ALBUMIN SERPL-MCNC: 3 G/DL (ref 3.5–5)
ALBUMIN/GLOB SERPL: 0.9 {RATIO} (ref 1.1–2.2)
ALP SERPL-CCNC: 239 U/L (ref 45–117)
ALT SERPL-CCNC: 65 U/L (ref 12–78)
ANION GAP SERPL CALC-SCNC: 4 MMOL/L (ref 5–15)
AST SERPL-CCNC: 42 U/L (ref 15–37)
BASOPHILS # BLD: 0 K/UL (ref 0–0.1)
BASOPHILS NFR BLD: 1 % (ref 0–1)
BILIRUB SERPL-MCNC: 0.5 MG/DL (ref 0.2–1)
BUN SERPL-MCNC: 10 MG/DL (ref 6–20)
BUN/CREAT SERPL: 11 (ref 12–20)
CALCIUM SERPL-MCNC: 9.2 MG/DL (ref 8.5–10.1)
CEA SERPL-MCNC: 10.8 NG/ML
CHLORIDE SERPL-SCNC: 113 MMOL/L (ref 97–108)
CO2 SERPL-SCNC: 26 MMOL/L (ref 21–32)
CREAT SERPL-MCNC: 0.94 MG/DL (ref 0.7–1.3)
DIFFERENTIAL METHOD BLD: ABNORMAL
EOSINOPHIL # BLD: 0.1 K/UL (ref 0–0.4)
EOSINOPHIL NFR BLD: 2 % (ref 0–7)
ERYTHROCYTE [DISTWIDTH] IN BLOOD BY AUTOMATED COUNT: 16.3 % (ref 11.5–14.5)
GLOBULIN SER CALC-MCNC: 3.2 G/DL (ref 2–4)
GLUCOSE SERPL-MCNC: 119 MG/DL (ref 65–100)
HCT VFR BLD AUTO: 35.1 % (ref 36.6–50.3)
HGB BLD-MCNC: 11.6 G/DL (ref 12.1–17)
IMM GRANULOCYTES # BLD AUTO: 0 K/UL (ref 0–0.04)
IMM GRANULOCYTES NFR BLD AUTO: 0 % (ref 0–0.5)
LYMPHOCYTES # BLD: 0.8 K/UL (ref 0.8–3.5)
LYMPHOCYTES NFR BLD: 29 % (ref 12–49)
MCH RBC QN AUTO: 33 PG (ref 26–34)
MCHC RBC AUTO-ENTMCNC: 33 G/DL (ref 30–36.5)
MCV RBC AUTO: 99.7 FL (ref 80–99)
MONOCYTES # BLD: 0.4 K/UL (ref 0–1)
MONOCYTES NFR BLD: 15 % (ref 5–13)
NEUTS SEG # BLD: 1.3 K/UL (ref 1.8–8)
NEUTS SEG NFR BLD: 53 % (ref 32–75)
NRBC # BLD: 0 K/UL (ref 0–0.01)
NRBC BLD-RTO: 0 PER 100 WBC
PLATELET # BLD AUTO: 64 K/UL (ref 150–400)
PMV BLD AUTO: 11.1 FL (ref 8.9–12.9)
POTASSIUM SERPL-SCNC: 3.6 MMOL/L (ref 3.5–5.1)
PROT SERPL-MCNC: 6.2 G/DL (ref 6.4–8.2)
RBC # BLD AUTO: 3.52 M/UL (ref 4.1–5.7)
RBC MORPH BLD: ABNORMAL
SODIUM SERPL-SCNC: 143 MMOL/L (ref 136–145)
WBC # BLD AUTO: 2.6 K/UL (ref 4.1–11.1)

## 2022-09-26 PROCEDURE — 77030016057 HC NDL HUBR APOL -B

## 2022-09-26 PROCEDURE — 82378 CARCINOEMBRYONIC ANTIGEN: CPT

## 2022-09-26 PROCEDURE — 36591 DRAW BLOOD OFF VENOUS DEVICE: CPT

## 2022-09-26 PROCEDURE — 85025 COMPLETE CBC W/AUTO DIFF WBC: CPT

## 2022-09-26 PROCEDURE — 99215 OFFICE O/P EST HI 40 MIN: CPT | Performed by: INTERNAL MEDICINE

## 2022-09-26 PROCEDURE — 80053 COMPREHEN METABOLIC PANEL: CPT

## 2022-09-26 RX ORDER — EPINEPHRINE 1 MG/ML
0.3 INJECTION, SOLUTION, CONCENTRATE INTRAVENOUS AS NEEDED
Status: CANCELLED | OUTPATIENT
Start: 2022-10-03

## 2022-09-26 RX ORDER — HEPARIN 100 UNIT/ML
300-500 SYRINGE INTRAVENOUS AS NEEDED
Status: CANCELLED
Start: 2022-10-03

## 2022-09-26 RX ORDER — HYDROCORTISONE SODIUM SUCCINATE 100 MG/2ML
100 INJECTION, POWDER, FOR SOLUTION INTRAMUSCULAR; INTRAVENOUS AS NEEDED
Status: CANCELLED | OUTPATIENT
Start: 2022-10-03

## 2022-09-26 RX ORDER — PALONOSETRON 0.05 MG/ML
0.25 INJECTION, SOLUTION INTRAVENOUS ONCE
Status: CANCELLED | OUTPATIENT
Start: 2022-10-03 | End: 2022-10-03

## 2022-09-26 RX ORDER — ALBUTEROL SULFATE 0.83 MG/ML
2.5 SOLUTION RESPIRATORY (INHALATION) AS NEEDED
Status: CANCELLED
Start: 2022-10-03

## 2022-09-26 RX ORDER — DIPHENHYDRAMINE HYDROCHLORIDE 50 MG/ML
50 INJECTION, SOLUTION INTRAMUSCULAR; INTRAVENOUS AS NEEDED
Status: CANCELLED
Start: 2022-10-03

## 2022-09-26 RX ORDER — DIPHENHYDRAMINE HYDROCHLORIDE 50 MG/ML
25 INJECTION, SOLUTION INTRAMUSCULAR; INTRAVENOUS AS NEEDED
Status: CANCELLED
Start: 2022-10-03

## 2022-09-26 RX ORDER — DEXTROSE MONOHYDRATE 50 MG/ML
25 INJECTION, SOLUTION INTRAVENOUS CONTINUOUS
Status: CANCELLED
Start: 2022-10-03

## 2022-09-26 RX ORDER — ATROPINE SULFATE 0.4 MG/ML
0.4 INJECTION, SOLUTION ENDOTRACHEAL; INTRAMEDULLARY; INTRAMUSCULAR; INTRAVENOUS; SUBCUTANEOUS
Status: CANCELLED | OUTPATIENT
Start: 2022-10-03

## 2022-09-26 RX ORDER — ACETAMINOPHEN 325 MG/1
650 TABLET ORAL AS NEEDED
Status: CANCELLED
Start: 2022-10-03

## 2022-09-26 RX ORDER — SODIUM CHLORIDE 9 MG/ML
25 INJECTION, SOLUTION INTRAVENOUS CONTINUOUS
Status: CANCELLED
Start: 2022-10-03

## 2022-09-26 RX ORDER — SODIUM CHLORIDE 9 MG/ML
10 INJECTION INTRAMUSCULAR; INTRAVENOUS; SUBCUTANEOUS AS NEEDED
Status: CANCELLED | OUTPATIENT
Start: 2022-10-03

## 2022-09-26 RX ORDER — SODIUM CHLORIDE 0.9 % (FLUSH) 0.9 %
10 SYRINGE (ML) INJECTION AS NEEDED
Status: CANCELLED | OUTPATIENT
Start: 2022-10-03

## 2022-09-26 RX ORDER — ONDANSETRON 2 MG/ML
8 INJECTION INTRAMUSCULAR; INTRAVENOUS AS NEEDED
Status: CANCELLED | OUTPATIENT
Start: 2022-10-03

## 2022-09-26 NOTE — PROGRESS NOTES
Kayce Ellisbranden Patterson is a 41 y.o. male up of colon cancer, h/o lymphoma. 1. Have you been to the ER, urgent care clinic since your last visit? Hospitalized since your last visit?no    2. Have you seen or consulted any other health care providers outside of the [address] [place] [address] since your last visit? Include any pap smears or colon screening.  No    Vitals 9/26/2022   Blood Pressure 139/86   Pulse 85   Temp 97.9   Resp 16   Height 5' 7\"   Weight 141 lb 4.8 oz   SpO2 100   BSA 1.74 m2   BMI 22.13 kg/m2   BP comment

## 2022-09-26 NOTE — PROGRESS NOTES
[facility] VISIT NOTE  Date: 2022    Name: Kristi Geiger. MRN: [id number]         : 1975    Mr. Rex Miller Arrived ambulatory and in no distress for C9D1 of Folfoxfiri Regimen (HELD). Assessment was completed, no acute issues at this time, no new complaints voiced. Right chest wall port accessed without difficulty, labs drawn & sent for processing. Do you have any symptoms of COVID-19? SOB, coughing, fever, or generally not feeling well NO    2. Have you been exposed to COVID-19 recently? NO    3. Have you had any recent contact with family/friend that has a pending COVID test? NO       Chemotherapy Flowsheet 2022   Cycle C9D1   Date 2022   Drug / Regimen Fofoxfiri   Post Hydration -   Pre Meds -   Notes HELD           Mr. Nick Gonzalez vitals were reviewed. Patient Vitals for the past 12 hrs:   Temp Pulse Resp BP SpO2   22 0805 97.9 °F (36.6 °C) 85 16 139/86 100 %         Lab results were obtained and reviewed. Recent Results (from the past 12 hour(s))   CBC WITH AUTOMATED DIFF    Collection Time: 22  8:08 AM   Result Value Ref Range    WBC 2.6 (L) 4.1 - 11.1 K/uL    RBC 3.52 (L) 4.10 - 5.70 M/uL    HGB 11.6 (L) 12.1 - 17.0 g/dL    HCT 35.1 (L) 36.6 - 50.3 %    MCV 99.7 (H) 80.0 - 99.0 FL    MCH 33.0 26.0 - 34.0 PG    MCHC 33.0 30.0 - 36.5 g/dL    RDW 16.3 (H) 11.5 - 14.5 %    PLATELET 64 (L) 894 - 400 K/uL    MPV 11.1 8.9 - 12.9 FL    NRBC 0.0 0  WBC    ABSOLUTE NRBC 0.00 0.00 - 0.01 K/uL    NEUTROPHILS 53 32 - 75 %    LYMPHOCYTES 29 12 - 49 %    MONOCYTES 15 (H) 5 - 13 %    EOSINOPHILS 2 0 - 7 %    BASOPHILS 1 0 - 1 %    IMMATURE GRANULOCYTES 0 0.0 - 0.5 %    ABS. NEUTROPHILS 1.3 (L) 1.8 - 8.0 K/UL    ABS. LYMPHOCYTES 0.8 0.8 - 3.5 K/UL    ABS. MONOCYTES 0.4 0.0 - 1.0 K/UL    ABS. EOSINOPHILS 0.1 0.0 - 0.4 K/UL    ABS. BASOPHILS 0.0 0.0 - 0.1 K/UL    ABS. IMM.  GRANS. 0.0 0.00 - 0.04 K/UL    DF SMEAR SCANNED      RBC COMMENTS NORMOCYTIC, NORMOCHROMIC METABOLIC PANEL, COMPREHENSIVE    Collection Time: 09/26/22  8:08 AM   Result Value Ref Range    Sodium 143 136 - 145 mmol/L    Potassium 3.6 3.5 - 5.1 mmol/L    Chloride 113 (H) 97 - 108 mmol/L    CO2 26 21 - 32 mmol/L    Anion gap 4 (L) 5 - 15 mmol/L    Glucose 119 (H) 65 - 100 mg/dL    BUN 10 6 - 20 MG/DL    Creatinine 0.94 0.70 - 1.30 MG/DL    BUN/Creatinine ratio 11 (L) 12 - 20      GFR est AA >60 >60 ml/min/1.73m2    GFR est non-AA >60 >60 ml/min/1.73m2    Calcium 9.2 8.5 - 10.1 MG/DL    Bilirubin, total 0.5 0.2 - 1.0 MG/DL    ALT (SGPT) 65 12 - 78 U/L    AST (SGOT) 42 (H) 15 - 37 U/L    Alk. phosphatase 239 (H) 45 - 117 U/L    Protein, total 6.2 (L) 6.4 - 8.2 g/dL    Albumin 3.0 (L) 3.5 - 5.0 g/dL    Globulin 3.2 2.0 - 4.0 g/dL    A-G Ratio 0.9 (L) 1.1 - 2.2     CEA    Collection Time: 09/26/22  8:08 AM   Result Value Ref Range    CEA 10.8 ng/mL         Treatment held due to low platelet count. Patient educated on reasons for holding treatment. RN notified pharmacist of held treatment. Mr. Ronna Lin was discharged from [place] [address] in stable condition at 0900. Port de-accessed, flushed & heparinized per protocol. He is to return on  October 3, 2022 at 0730 for his next appointment.     [facility]  September 26, 2022    Future Appointments:  Future Appointments   Date Time Provider Dahlia Epps   9/26/2022  3:30  [facility] [address] SFMRCT [facility]   10/3/2022  7:30 AM SS INF2 CH2 >7H [facility]   10/3/2022  8:45 AM Colt Campbell MD [facility] BS AMB   10/5/2022  1:30 PM SS INF5 CH4 <1H [facility]   10/11/2022  9:30 AM Amirah Griffiths MD PCS BS AMB   10/17/2022  7:30 [facility]   10/19/2022  2:30 PM SS INF5 CH4 <1H [facility]   10/31/2022  7:30 AM SS INF2 CH2 >7H [facility]   11/2/2022  1:30 [facility]

## 2022-09-28 ENCOUNTER — TELEPHONE (OUTPATIENT)
Dept: ONCOLOGY | Age: 47
End: 2022-09-28

## 2022-09-28 ENCOUNTER — HOSPITAL ENCOUNTER (OUTPATIENT)
Dept: INFUSION THERAPY | Age: 47
End: 2022-09-28

## 2022-09-28 NOTE — PROGRESS NOTES
[id number] [facility] at [address] [place] [address]  [phone]    Hematology / Oncology Established Visit    Reason for Visit:   Nic Sanchez is a 41 y.o. male who is seen for urgent follow up of colon cancer, h/o lymphoma. Hematology Oncology Treatment History:     Diagnosis #1: Follicular lymphoma  Stage: IV  Pathology:   11/13/18 right inguinal LN excision: Follicular lymphoma, high-grade (grade 3a of 3). Prior Treatment:   1. Obinutuzumab-CHOP. Obinutuzumab: 1000 mg weekly on days 1, 8, 15 for cycle 1, then 1000 mg on day 1 q21 days for cycles 2-6, then monotherapy 1000 mg every 21 days for cycle 7, 8 with Cyclophosphamide 750mg/m2, Doxorubicin 50mg/m2, Vincristine 1.4mg/m2 on day 1 and Prednisone 100mg on Days 1-5, every 21 days for a total of 2 cycles completed late 1/2019. Regimen discontinued due to STEMI. 2. Obinutuzumab + Bendamustine: 1000 mg Obinutuzumab on day 1 + Bendamustine 90mg/m2 on days 1-2 on a 28-day cycle x 4 cycles, completed 5/2019  Current Treatment: Surveillance      Diagnosis #2: Colon cancer:  Stage: IV  Pathology:  Ascending colon; biopsy: poorly differentiated carcinoma  Comment: No dysplasia is identified. Immunohistochemical stains performed on block 1A, show the tumor positive for Cam5.2 and shows patchy positivity for CDX2 with a Ki-67 index of -90%; the tumor is focally positive for CK5/6 and GATA3; negative for p63, Pax8, CK7, CK20, panmelanoma, synaptophysin and chromogranin. The immunophenotypic features are nonspecific but argues somewhat against the following carcinoma: urothelial, neuroendocrine and breast. The immunophenotypic features also argues against melanoma and somewhat against classic colorectal carcinoma. Additional immunohistochemical stains to exclude the possibility of prostate and hepatocellular carcinoma have been   ordered; the results will be reported as an addendum.   MLH1/MSH2/MSH6/PMS2 all intact with no loss of nuclear expression of MMR proteins - no MSI   Addendum: The addendum is issued to report the results of additional immunohistochemical stains in an attempt to ascertain the primary site of the carcinoma. The diagnosis is unchanged. Hepar and glypican 3 immunohistochemical stains are neg, arguing against hepatocellular carcinoma. PSA stain is negative, arguing against prostatic primary. Imaging studies to eval for poss primary sites maybe useful. In the absence of carcinoma/tumor at any other sites, this may represent an undifferentiated carcinoma of the colon. Oncogenomics (molecular) studies: POLE< ARID1A, YVONNE, ATR, BRCA2, CHECK1, FANCI, NF1, PIK3CA, PTCH1, PTEN, RAD50, RAD51, RB1, SMARCA4, STK11    Prior Treatment:   1. Loop ileostomy 2/19/22  2. Diagnotic laparoscopy with peritoneal biopsy, 4/27/22    Current Treatment: FOLFOXIRI every 2 weeks until disease progression or toxicity, 5/16/22 - current      Oncologic History:  Was in his usual state of health in early November 2018 when he developed low back pain and presented to the ER. Work-up at outside hospital revealed a retroperitoneal mass seen on CT imaging, and he was transferred to [facility] for further work-up. CT there showed a large retroperitoneal mass encircling the aorta with invasion of the left renal hilum and left adrenal gland. There were bilateral inguinal lymph nodes and moderate left hydronephrosis. He was evaluated while at [facility] and was noted to have palpable nodes in his groin for approximately the past 1 month. He underwent excisional LN biopsy of right inguinal LN, which revealed follicular lymphoma. At time of diagnosis, he had no cardiac disease aside from HTN, hyperlipidemia. However, he did have an NSTEMI after cycle 2 of O-CHOP. Was likely unrelated to chemotherapy, but opted to switch treatment to Obinutuzumab-Bendamustine. He completed treatment in 5/2019 and had a CR based on PET.     History of Present Illness:   Mr. Kyaw Arguello is a 41 y.o. male with is seen for follow up of colon cancer and retry of C9 of treatment. Has not yet scheduled CT, but is hoping to get this scheduled for next week. Denies diarrhea, vomiting. Does have nausea the week of treatment managed with antiemetics. Is eating well and maintaining weight. Denies neuropathy, fevers, chills. PMHx: Lymphoma in 2018, CAD, HTN, Hyperlipidemia, Anxiety, chronic low back pain  PSurgHx: None aside from cardiac stent placement and port placement  SHx: Smokes 1/2ppd x past 20 yrs. Works part time as a cook. Has 2 children. FHx: Father had leukemia, passed away from pancreatic cancer. Review of Systems: A complete review of systems was obtained, negative except as described above. Physical Exam:     Visit Vitals  /78 (BP 1 Location: Left upper arm, BP Patient Position: Sitting, BP Cuff Size: Adult)   Pulse 72   Temp 98.1 °F (36.7 °C)   Resp 16   Ht 5' 7\" (1.702 m)   Wt 145 lb (65.8 kg)   SpO2 100%   BMI 22.71 kg/m²       ECOG PS: 0  General: no distress  Eyes: anicteric sclerae  HENT: oropharynx clear  Neck: supple  Lymphatic: no cervical, supraclavicular adenopathy  Respiratory: CTAB, normal respiratory effort  CV: no peripheral edema, port upper chest   GI: soft, nontender, nondistended, no masses;  colostomy in place. Skin: no rashes; no ecchymoses; no petechiae      Results:     Lab Results   Component Value Date/Time    WBC 3.0 (L) 10/03/2022 08:25 AM    HGB 11.7 (L) 10/03/2022 08:25 AM    HCT 35.4 (L) 10/03/2022 08:25 AM    PLATELET 77 (L) 69/67/5083 08:25 AM    .9 (H) 10/03/2022 08:25 AM    ABS.  NEUTROPHILS 1.1 (L) 10/03/2022 08:25 AM    Hemoglobin (POC) 15.0 06/05/2009 02:13 PM    Hematocrit (POC) 39 02/14/2019 01:24 PM     Lab Results   Component Value Date/Time    Sodium 140 10/03/2022 07:50 AM    Potassium 3.3 (L) 10/03/2022 07:50 AM    Chloride 108 10/03/2022 07:50 AM    CO2 27 10/03/2022 07:50 AM    Glucose 123 (H) 10/03/2022 07:50 AM    BUN 13 10/03/2022 07:50 AM    Creatinine 0.85 10/03/2022 07:50 AM    GFR est AA >60 10/03/2022 07:50 AM    GFR est non-AA >60 10/03/2022 07:50 AM    Calcium 9.2 10/03/2022 07:50 AM    Sodium (POC) 136 2019 01:24 PM    Potassium (POC) 3.9 2019 01:24 PM    Chloride (POC) 102 2019 01:24 PM    Glucose (POC) 249 (H) 02/15/2019 10:21 PM    BUN (POC) 14 2019 01:24 PM    Creatinine (POC) 0.9 2019 01:24 PM    Calcium, ionized (POC) 1.24 2019 01:24 PM     Lab Results   Component Value Date/Time    Bilirubin, total 0.4 10/03/2022 07:50 AM    ALT (SGPT) 40 10/03/2022 07:50 AM    Alk. phosphatase 253 (H) 10/03/2022 07:50 AM    Protein, total 6.2 (L) 10/03/2022 07:50 AM    Albumin 3.1 (L) 10/03/2022 07:50 AM    Globulin 3.1 10/03/2022 07:50 AM     Lab Results   Component Value Date/Time    Iron 18 (L) 2022 11:13 AM    TIBC 173 (L) 2022 11:13 AM    Iron % saturation 10 (L) 2022 11:13 AM    Ferritin 426 (H) 2022 11:13 AM       No results found for: B12LT, FOL, RBCF  Lab Results   Component Value Date/Time    TSH 1.53 2016 04:40 AM       18:       Labs at [facility]:  22: CA 19-9 = 390  22: CEA = 12.3  22: CEA = 19.2  22: CEA = 17.9   22: CEA = 6.0    Signatera MRD:  22: 295.97 MTM/mL  22: 2.98  22: 0.88     Imagin/9/18 Abd/pelvis CT: IMPRESSION:  1. Interval development of a large retroperitoneal mass encircling the aorta with invasion of the left renal hilum and left adrenal gland. Several adjacent lymph nodes are seen extending into the peritoneum and underneath the  diaphragmatic natalie. This most likely represents lymphoma. 2. Several new bilateral enlarged inguinal lymph nodes also likely representing lymphoma. 3. Moderate left hydronephrosis with a delayed renal nephrogram related to decreased renal function. This is related to the invasion of the renal hilum. 18 Chest CT:   IMPRESSION:  Trace left pleural effusion. Bilateral lower lobe atelectasis. Large  retroperitoneal mass lesion again demonstrated. PET 12/18/18:  FINDINGS:  HEAD/NECK: Right palatine tonsil intense hypermetabolism, max SUV 18. Multilevel  bilateral cervical adenopathy, with max SUV 12 in a left supraclavicular node. Cerebral evaluation is limited by normal intense activity. CHEST: Solitary hypermetabolic left axillary node, max SUV 11. ABDOMEN/PELVIS: Bulky retroperitoneal mass max SUV 27, with several additional  small active abdomino-pelvic nodes. Bilateral inguinal nodes with max SUV 12 on  the left. SKELETON: No foci of abnormal hypermetabolism in the axial and visualized  appendicular skeleton. IMPRESSION:   1. Right palatine tonsil tumor involvement (Deauville 5). 2. Bilateral cervical delaney involvement (Deauville 5). 3. Left axillary node involvement (Deauville 5). 4. Bulky retroperitoneal lymphoma mass and additional smaller hypermetabolic  abdomino-pelvic nodes (Deauville 5). 5. Bilateral inguinal delaney involvement (Deauville 5). Deauville Five Point Scale  1. No uptake or no residual uptake (when used interim)  2. Slight uptake, but below blood pool (mediastinum)  3. Uptake above mediastinal, but below/equal to uptake in the liver  4. Uptake slightly to moderately higher than liver  5. Markedly increased uptake or any new lesion (on response evaluation)  Each FDG-avid (or previously FDG avid) lesion is rated independently. Reference values:  Mediastinal blood pool: 2.1 SUV  Liver (background): 2.2 SUV    PET/CT 2/05/19:   IMPRESSION:  1. No Foci of Abnormal Hypermetabolism (Deauville 1). 2. Resolved activity in the right palatine tonsil, bilateral cervical nodes,left axillary node, retroperitoneal/abdominal pelvic adenopathy, bilateral inguinal nodes. Echo 2/14/19:  Normal cavity size, wall thickness and systolic function (ejection fraction normal).  The muscle mass is normal. The cavity shape is normal. The estimated ejection fraction is 41 - 45%. Abnormal wall motion as described on the wall scoring diagram below. End-systolic volume is normal. Normal left ventricular strain. There is mild (grade 1) left ventricular diastolic dysfunction. Normal left ventricular diastolic pressure. End-diastolic volume is normal.    LE arterial duplex 2/22/19:  There is evidence of left groin pseudoaneurysm noted arising from distal common femoral artery, pseudo lobe measures 2.32cm x 2.58cm and pseudo neck length measuring 0.63cm. There is no evidence of hemodynamically significant left lower extremity arterial obstruction. JACLYN is 1.03 on the right and 1.02 on the left. LE arterial duplex s/p Thrombin Injection to Pseudoaneurysm 2/26/19:  Successful thrombin injection procedure of the left groin with no further flow seen. No evidence of hemodnyamically significant obstruction in the left lower extremity. Left lower extremity arterial duplex performed. Confirmed pseudoaneurysm in left groin with small neck. Following thrombin injection, no further flow seen in the pseudoaneurysm. The left common femoral, profunda femoral, femoral, popliteal, posterior tibial and anterior arteries were imaged. Mainly triphasic flow was seen with no evidence of significantly elevated velocities. Repeat LE arterial duplex 2/27/19:  Continued thrombosed left groin pseudoaneurysm following thrombin injection on 02/26/2019. No flow or color fill is identified. The hematoma measures approximately 2.1 x 2.9 cm in diameter. The common femoral, deep femoral, femoral, and popliteal arteries are patent with mainly tri-phasic flow and no significant hemodynamically obstruction is noted. Stress 5/31/19:  Normal stress myocardial perfusion without ischemia or infact at 84% MPHR. Normal LV function. LVEF 60%. No EKG changes of ischemia at peak exercise. Normal functional capacity. PET 6/03/19:   IMPRESSION: No Foci of Abnormal Hypermetabolism (Deauville 1). -MRI lumbar spine 12/18/19:  Mild disc degenerative change at L3-4 and L4-5. Mild canal stenosis at L3-4 and mild left foraminal stenosis at L4-5. Other less severe degenerative findings are as described above. Continued diminished size of retroperitoneal mass-adenopathy,  with diminished soft tissue density at the left renal hilum. -CT chest/abdomen 12/16/19:  Findings are consistent with interval response to therapy    CT c/a/p 5/28/20: IMPRESSION:  Further contraction of the retroperitoneal mantle and chris mesentery,  compatible with treatment response  Stable left hilar soft tissue mass  Slight increased splaying of the duodenum and proximal jejunum is the result, without obstruction  No evidence for recurrence of lymphoma in the chest, abdomen, or pelvis    CT c/a/p 2/13/22:  FINDINGS:   LOWER THORAX: Dependent atelectasis with otherwise clear lungs. The visualized  heart is normal in size without pericardial effusion. LIVER: No mass. BILIARY TREE: Gallbladder is unremarkable. CBD is not dilated. SPLEEN: Unremarkable. PANCREAS: No mass or ductal dilatation. ADRENALS: Unremarkable. KIDNEYS: Atrophic left kidney with mild left hydronephrosis. Right kidney is  unremarkable with no stone, enhancing mass, or other renal abnormality. STOMACH: Unremarkable. SMALL BOWEL: No dilatation or wall thickening. COLON: Circumferential wall thickening at the hepatic flexure with luminal  narrowing, adjacent mesenteric stranding, and fluid with upstream retention in  the cecum and fecalization of distal ileal contents. No dilation or other wall  thickening. APPENDIX: Unremarkable. PERITONEUM: Moderate free fluid with no pneumoperitoneum. RETROPERITONEUM: Soft tissue stranding around the celiac and SMA and associated aorta with no discrete mass or adenopathy. Aorta is normal in size without aneurysm or dissection.   REPRODUCTIVE ORGANS: Prostate and seminal vesicles appear unremarkable. URINARY BLADDER: No mass or calculus. BONES: No destructive bone lesion. ABDOMINAL WALL: No mass or hernia. ADDITIONAL COMMENTS: N/A  IMPRESSION  1. Annular mass lesion of the right colon with upstream fecal retention  concerning for primary colon neoplasm versus less likely lymphoma. 2. Soft tissue stranding around celiac axis, SMA, and abdominal aorta may  reflect infiltrative lymphoma. 3. Left renal atrophy with left hydronephrosis likely reflecting chronic  proximal ureteral obstruction. 4. Incidentals as above. 2/24/22 CT abd/pelvis at [facility]:  FINDINGS: An enteric tube with sidehole beyond the level of the lower esophageal sphincter is seen looping on itself in the proximal stomach before terminating in the mid stomach. Status post right lower quadrant diverting ileostomy for an obstructing colonic mass. Multiple loops of gas dilated small bowel remain, with a maximal diameter of 5.4 cm whereas previously measured 3.6 cm. Dilute contrast is seen within the lateral left abdominal dilated small bowel. Moderate stool burden and bowel gas opacifies the cecum, measuring 7.3 cm in diameter. No definite supine evidence of pneumoperitoneum. Lung bases: Limited evaluation of the lung bases demonstrates a cardiac silhouette within normal limits for size. A small bore central venous catheter is seen terminating in the right atrium. No focal consolidation or pleural effusion. 2/24/22 CT abd/pelvis [facility]:  IMPRESSION:  1. Status post right lower quadrant loop ileostomy. Mild diffuse dilation of small bowel proximal to the ostomy in the lower abdomen and upper pelvis concerning for at least mild to moderate partial bowel obstruction. Relatively decompressed loop ileostomy. 2. Mildly complex pelvic fluid collection in the rectovesical space, decreased in size from prior. 3. Redemonstrated ascending colon mass and findings suspicious for regional metastatic disease.   4. Redemonstrated soft tissue in the retroperitoneum with prominent lymph nodes, similar to prior examination. Differential would include metastasis, retroperitoneal fibrosis. 5. Mild atherosclerosis. 6. Subcentimeter right renal hypodensity, too small to characterize. 7. Severe compression of the left renal vein, similar to prior examination. 8. Additional findings as described above. Bones: No acute osseous abnormality. 2/24/22 CT chest [facility]:  IMPRESSION:  FINAL report. 1. No evidence of metastatic disease to the chest.  2. No enlarged lymph nodes. 3. Increasing volume loss in the lung bases which may be due to atelectasis. 4. CT abdomen and pelvis reported separately. 2/25/22 Colonoscopy at [facility]:  Impression:            - Preparation of the colon was inadequate. - Stool in the entire examined colon. - Likely malignant completely obstructing tumor at the                         hepatic flexure. Biopsied.                        - Malignant-appearing tumor in the colon. Complications:         No immediate complications. 7/19/22 CT ch/abd/pelv:  IMPRESSION  Response to treatment characterized by decrease in size of the apical core  lesion in the proximal transverse colon and decreased mucosal colic  lymphadenopathy. Persistent mesenteric lymphadenopathy and retroperitoneal/mesenteric soft tissue  which may relate to the patient's history of lymphoma. Chronic left renal atrophy with chronic left hydronephrosis. RECIST:  Target lesions:  Transverse colon mass, series 2, image 75, 27 x 26 mm, previously 49 x 45 mm. Nontarget lesions:  Transverse mesocolic lymph nodes, decreased. Assessment & Plan:   Ricarda Gunderson is a 41 y.o. male comes in for evaluation and management of lymphoma. 1. Undifferentiated carcinoma of transverse colon, metastatic, KRAS/NRAS status unclear:  S/p diverting ileostomy due to large bowel obstruction. Colonoscopy with biopsy on 2/25/22. No obvious metastatic disease on CT imaging, but did have obvious metastatic disease to peritoneum during laparoscopy with Dr. Elle Wilson. I recommended FOLFOXIRI every 2 weeks. Will not give Bevacizumab given h/o MRI and tobacco use. Lyubov suggests: BRCA2 and YVONNE mutations support use of Olaparib or similar PARP inhibitor in future. They also suggest testing for TMB, MSI, PDL1 to determine utility of checkpoint inhibitors. CT after C4 showed good response. Supportive medications: zofran, compazine, dexamethasone, EMLA topical  -- Proceed with C9 FOLFOXIRI. Dose-reducing Irinotecan by 10% to avoid cytopenias/treatment delays. -- Adding KCl 40mEQ today in [facility] for hypokalemia. -- Repeat Signatera testing showed continued decline. Repeat due next visit in 10/2022. Looking into adding [employer] or alternate NGS testing to determine TMB, MSI, PDL1, KRAS/NRAS/BRAF. -- Check CEA every 8 weeks  -- Consider repeat colonoscopy in 4-6 months given incomplete colonoscopy. -- Repeat CT due now - pt was asked to get this done prior to next treatment. CT after every 4 cycles. -- Return in 2 weeks C10 of FOLFOXIRI     2. H/o Follicular lymphoma:   Grade 3a with bulky disease encircling the aorta, causing pain. Bone marrow negative. BR better than RCHOP, but based on GALLIUM study, Obinutuzumab-based induction and maintenance prolongs PFS over that seen with rituximab-based therapy. Therefore, pt started on O-CHOP regimen. Pt achieved CR after C2, but was switched to BR x 4 cycles given STEMI. Completed treatment 5/2019. End of treatment PET 6/3/19 showed CR. CT 5/28/20 negative for disease. No extra surveillance needed at this time given metastatic colon cancer with frequent imaging. 3. Chronic lumbar back pain / anxiety / RLQ:   Left lower back pain is chronic/stable and RLQ is likely related to neoplasm/colostomy - currently managing pain on Oxycontin 10mg prn, and Lexapro daily. Following with Dr. Dulce Borja. 4. CAD / HTN:   h/o STEMI 2/14/29 with TOM placed to proximal LAD. Following with Dr. Duarte Melo and remains on dual antiplatelet therapy, high dose Lipitor, Metoprolol, ACEI. Received only 2 doses of cardiotoxic chemo, Doxorubicin in early 1/2019. Is overdue for follow up with Dr. Duarte Melo. 5. Tobacco abuse:   Previously discussed smoking cessation strategies and benefits. Pt has tried quitting in the past and is not interested. 6. BRCA2 mutation:  Pt would benefit from genetic counseling for himself and his family. 7. H/o chemotherapy induced neutropenia:  Neulasta added with C3-4, but now on hold due to insurance denial.   -- Consider adding Ziextendo if persistent neutropenia. 8. Chemotherapy induced thrombocytopenia:   Holding treatment today given PLT < 75. Monitor for bleeding. Goals of care: Disease is not curable, but is treatable to improve quality and duration of life. I personally provided the service today.      Signed By: Brayden Connolly MD

## 2022-09-28 NOTE — TELEPHONE ENCOUNTER
[address] at [facility]  [phone]    09/28/22 3:17 PM - Attempted to call the patient to provide him with the scheduling number to set up his CT prior to his appointment on 10/3. There was no answer. Left a voicemail for the patient to call back at their earliest convenience along with the phone number to our office. [address] at [facility] [address]  [phone]    09/28/22 4:56 PM - Attempted to call the patient. There was no answer. Left a voicemail for the patient to call back at their earliest convenience along with the phone number to our office. [address] at [facility]  [phone]    09/29/22 4:58 PM - Attempted to call the patient. There was no answer. Left a voicemail for the patient to call back at their earliest convenience along with the phone number to our office.

## 2022-09-29 DIAGNOSIS — C18.9 COLON ADENOCARCINOMA (HCC): ICD-10-CM

## 2022-09-29 DIAGNOSIS — R10.84 ABDOMINAL PAIN, GENERALIZED: ICD-10-CM

## 2022-09-29 DIAGNOSIS — C78.6 PERITONEAL CARCINOMATOSIS (HCC): ICD-10-CM

## 2022-09-29 RX ORDER — OXYCODONE HYDROCHLORIDE 10 MG/1
10 TABLET ORAL
Qty: 90 TABLET | Refills: 0 | Status: SHIPPED | OUTPATIENT
Start: 2022-10-06 | End: 2022-10-17 | Stop reason: SDUPTHER

## 2022-09-29 NOTE — TELEPHONE ENCOUNTER
Triage for Controlled Substance Refill Request    Pain Diagnosis: _Colon adenocarcinoma ([facility] [address] (C18.9); Abdominal pain, generalized (R10.84); Peritoneal carcinomatosis ([facility] [address] (C78.6)    Last Outpatient Visit: _8/22/2022    Next Outpatient Visit: _10/11/2022    Reason for refill needed outside of office visit? Appointment not scheduled prior to need for scheduled refill      Pharmacy: _[facility]/pharmacy #[id number]     Medication:oxyCODONE IR (ROXICODONE) 10 mg tab immediate release tablet    Dose and directions: Take 1 Tablet by mouth every four (4) hours as needed for Pain for up to 15 days.   Number dispensed:90  Date filled ( or Pharmacy):9/21/2022  # left:       reviewed: _yes    Date of Urine Drug Screen:  _8/22/2022    Opioid Safety Handout given:  _yes    Appropriate for refill:  _yes    Action:  _ Medication pending

## 2022-09-29 NOTE — TELEPHONE ENCOUNTER
Received call from patient requesting Oxycodone ER and IR refill. Patient has 3 pills remaining of ER and 30 of IR. To be called in to [facility] pharmacy.

## 2022-10-03 ENCOUNTER — OFFICE VISIT (OUTPATIENT)
Dept: ONCOLOGY | Age: 47
End: 2022-10-03
Payer: MEDICAID

## 2022-10-03 ENCOUNTER — HOSPITAL ENCOUNTER (OUTPATIENT)
Dept: INFUSION THERAPY | Age: 47
Discharge: HOME | End: 2022-10-03
Payer: MEDICAID

## 2022-10-03 VITALS
HEIGHT: 67 IN | BODY MASS INDEX: 22.76 KG/M2 | HEART RATE: 72 BPM | TEMPERATURE: 98.1 F | RESPIRATION RATE: 16 BRPM | OXYGEN SATURATION: 100 % | SYSTOLIC BLOOD PRESSURE: 123 MMHG | WEIGHT: 145 LBS | DIASTOLIC BLOOD PRESSURE: 78 MMHG

## 2022-10-03 VITALS
SYSTOLIC BLOOD PRESSURE: 117 MMHG | DIASTOLIC BLOOD PRESSURE: 74 MMHG | RESPIRATION RATE: 16 BRPM | WEIGHT: 145.8 LBS | HEART RATE: 67 BPM | HEIGHT: 67 IN | BODY MASS INDEX: 22.88 KG/M2 | OXYGEN SATURATION: 97 % | TEMPERATURE: 98.2 F

## 2022-10-03 DIAGNOSIS — Z76.89 PREVENTION OF CHEMOTHERAPY-INDUCED NEUTROPENIA: ICD-10-CM

## 2022-10-03 DIAGNOSIS — C18.9 COLON ADENOCARCINOMA (HCC): ICD-10-CM

## 2022-10-03 DIAGNOSIS — C18.4 CARCINOMA OF TRANSVERSE COLON (HCC): Primary | ICD-10-CM

## 2022-10-03 DIAGNOSIS — T45.1X5A CHEMOTHERAPY-INDUCED THROMBOCYTOPENIA: ICD-10-CM

## 2022-10-03 DIAGNOSIS — T45.1X5A CHEMOTHERAPY INDUCED NEUTROPENIA (HCC): Primary | ICD-10-CM

## 2022-10-03 DIAGNOSIS — D70.1 CHEMOTHERAPY INDUCED NEUTROPENIA (HCC): Primary | ICD-10-CM

## 2022-10-03 DIAGNOSIS — Z51.11 CHEMOTHERAPY MANAGEMENT, ENCOUNTER FOR: ICD-10-CM

## 2022-10-03 DIAGNOSIS — E87.6 HYPOKALEMIA: ICD-10-CM

## 2022-10-03 DIAGNOSIS — R10.31 RLQ ABDOMINAL PAIN: ICD-10-CM

## 2022-10-03 DIAGNOSIS — D69.59 CHEMOTHERAPY-INDUCED THROMBOCYTOPENIA: ICD-10-CM

## 2022-10-03 DIAGNOSIS — C82.90 FOLLICULAR LYMPHOMA, UNSPECIFIED FOLLICULAR LYMPHOMA TYPE, UNSPECIFIED BODY REGION (HCC): ICD-10-CM

## 2022-10-03 LAB
ALBUMIN SERPL-MCNC: 3.1 G/DL (ref 3.5–5)
ALBUMIN/GLOB SERPL: 1 {RATIO} (ref 1.1–2.2)
ALP SERPL-CCNC: 253 U/L (ref 45–117)
ALT SERPL-CCNC: 40 U/L (ref 12–78)
ANION GAP SERPL CALC-SCNC: 5 MMOL/L (ref 5–15)
AST SERPL-CCNC: 29 U/L (ref 15–37)
BASOPHILS # BLD: 0 K/UL (ref 0–0.1)
BASOPHILS NFR BLD: 1 % (ref 0–1)
BILIRUB SERPL-MCNC: 0.4 MG/DL (ref 0.2–1)
BUN SERPL-MCNC: 13 MG/DL (ref 6–20)
BUN/CREAT SERPL: 15 (ref 12–20)
CALCIUM SERPL-MCNC: 9.2 MG/DL (ref 8.5–10.1)
CEA SERPL-MCNC: 10.1 NG/ML
CHLORIDE SERPL-SCNC: 108 MMOL/L (ref 97–108)
CO2 SERPL-SCNC: 27 MMOL/L (ref 21–32)
CREAT SERPL-MCNC: 0.85 MG/DL (ref 0.7–1.3)
DIFFERENTIAL METHOD BLD: ABNORMAL
EOSINOPHIL # BLD: 0.3 K/UL (ref 0–0.4)
EOSINOPHIL NFR BLD: 9 % (ref 0–7)
ERYTHROCYTE [DISTWIDTH] IN BLOOD BY AUTOMATED COUNT: 15.8 % (ref 11.5–14.5)
GLOBULIN SER CALC-MCNC: 3.1 G/DL (ref 2–4)
GLUCOSE SERPL-MCNC: 123 MG/DL (ref 65–100)
HCT VFR BLD AUTO: 35.4 % (ref 36.6–50.3)
HGB BLD-MCNC: 11.7 G/DL (ref 12.1–17)
IMM GRANULOCYTES # BLD AUTO: 0 K/UL (ref 0–0.04)
IMM GRANULOCYTES NFR BLD AUTO: 0 % (ref 0–0.5)
LYMPHOCYTES # BLD: 1.1 K/UL (ref 0.8–3.5)
LYMPHOCYTES NFR BLD: 35 % (ref 12–49)
MCH RBC QN AUTO: 34 PG (ref 26–34)
MCHC RBC AUTO-ENTMCNC: 33.1 G/DL (ref 30–36.5)
MCV RBC AUTO: 102.9 FL (ref 80–99)
MONOCYTES # BLD: 0.5 K/UL (ref 0–1)
MONOCYTES NFR BLD: 15 % (ref 5–13)
NEUTS SEG # BLD: 1.1 K/UL (ref 1.8–8)
NEUTS SEG NFR BLD: 40 % (ref 32–75)
NRBC # BLD: 0 K/UL (ref 0–0.01)
NRBC BLD-RTO: 0 PER 100 WBC
PLATELET # BLD AUTO: 77 K/UL (ref 150–400)
PMV BLD AUTO: 10.4 FL (ref 8.9–12.9)
POTASSIUM SERPL-SCNC: 3.3 MMOL/L (ref 3.5–5.1)
PROT SERPL-MCNC: 6.2 G/DL (ref 6.4–8.2)
RBC # BLD AUTO: 3.44 M/UL (ref 4.1–5.7)
RBC MORPH BLD: ABNORMAL
RBC MORPH BLD: ABNORMAL
SODIUM SERPL-SCNC: 140 MMOL/L (ref 136–145)
WBC # BLD AUTO: 3 K/UL (ref 4.1–11.1)

## 2022-10-03 PROCEDURE — 96368 THER/DIAG CONCURRENT INF: CPT

## 2022-10-03 PROCEDURE — 96415 CHEMO IV INFUSION ADDL HR: CPT

## 2022-10-03 PROCEDURE — 74011250636 HC RX REV CODE- 250/636: Performed by: INTERNAL MEDICINE

## 2022-10-03 PROCEDURE — 36415 COLL VENOUS BLD VENIPUNCTURE: CPT

## 2022-10-03 PROCEDURE — 96375 TX/PRO/DX INJ NEW DRUG ADDON: CPT

## 2022-10-03 PROCEDURE — 96417 CHEMO IV INFUS EACH ADDL SEQ: CPT

## 2022-10-03 PROCEDURE — 96413 CHEMO IV INFUSION 1 HR: CPT

## 2022-10-03 PROCEDURE — 99215 OFFICE O/P EST HI 40 MIN: CPT | Performed by: INTERNAL MEDICINE

## 2022-10-03 PROCEDURE — 96366 THER/PROPH/DIAG IV INF ADDON: CPT

## 2022-10-03 PROCEDURE — 96416 CHEMO PROLONG INFUSE W/PUMP: CPT

## 2022-10-03 PROCEDURE — 74011000258 HC RX REV CODE- 258: Performed by: INTERNAL MEDICINE

## 2022-10-03 PROCEDURE — 74011250637 HC RX REV CODE- 250/637: Performed by: INTERNAL MEDICINE

## 2022-10-03 PROCEDURE — 82378 CARCINOEMBRYONIC ANTIGEN: CPT

## 2022-10-03 PROCEDURE — 80053 COMPREHEN METABOLIC PANEL: CPT

## 2022-10-03 PROCEDURE — 85025 COMPLETE CBC W/AUTO DIFF WBC: CPT

## 2022-10-03 PROCEDURE — 77030012965 HC NDL HUBR BBMI -A

## 2022-10-03 RX ORDER — HEPARIN 100 UNIT/ML
300-500 SYRINGE INTRAVENOUS AS NEEDED
Status: ACTIVE | OUTPATIENT
Start: 2022-10-03 | End: 2022-10-03

## 2022-10-03 RX ORDER — PALONOSETRON 0.05 MG/ML
0.25 INJECTION, SOLUTION INTRAVENOUS ONCE
Status: COMPLETED | OUTPATIENT
Start: 2022-10-03 | End: 2022-10-03

## 2022-10-03 RX ORDER — POTASSIUM CHLORIDE 750 MG/1
40 TABLET, FILM COATED, EXTENDED RELEASE ORAL ONCE
Status: COMPLETED | OUTPATIENT
Start: 2022-10-03 | End: 2022-10-03

## 2022-10-03 RX ORDER — ATROPINE SULFATE 0.4 MG/ML
0.4 INJECTION, SOLUTION ENDOTRACHEAL; INTRAMEDULLARY; INTRAMUSCULAR; INTRAVENOUS; SUBCUTANEOUS
Status: ACTIVE | OUTPATIENT
Start: 2022-10-03 | End: 2022-10-03

## 2022-10-03 RX ORDER — SODIUM CHLORIDE 9 MG/ML
10 INJECTION INTRAMUSCULAR; INTRAVENOUS; SUBCUTANEOUS AS NEEDED
Status: ACTIVE | OUTPATIENT
Start: 2022-10-03 | End: 2022-10-03

## 2022-10-03 RX ORDER — DEXTROSE MONOHYDRATE 50 MG/ML
25 INJECTION, SOLUTION INTRAVENOUS CONTINUOUS
Status: DISPENSED | OUTPATIENT
Start: 2022-10-03 | End: 2022-10-03

## 2022-10-03 RX ORDER — SODIUM CHLORIDE 9 MG/ML
25 INJECTION, SOLUTION INTRAVENOUS CONTINUOUS
Status: DISCONTINUED | OUTPATIENT
Start: 2022-10-03 | End: 2022-10-05 | Stop reason: HOSPADM

## 2022-10-03 RX ORDER — SODIUM CHLORIDE 0.9 % (FLUSH) 0.9 %
10 SYRINGE (ML) INJECTION AS NEEDED
Status: DISPENSED | OUTPATIENT
Start: 2022-10-03 | End: 2022-10-03

## 2022-10-03 RX ADMIN — LEUCOVORIN CALCIUM 680 MG: 200 INJECTION, POWDER, LYOPHILIZED, FOR SUSPENSION INTRAMUSCULAR; INTRAVENOUS at 12:33

## 2022-10-03 RX ADMIN — OXALIPLATIN 144.5 MG: 5 INJECTION, SOLUTION INTRAVENOUS at 12:33

## 2022-10-03 RX ADMIN — FLUOROURACIL 4080 MG: 50 INJECTION, SOLUTION INTRAVENOUS at 14:58

## 2022-10-03 RX ADMIN — DEXAMETHASONE SODIUM PHOSPHATE 12 MG: 4 INJECTION, SOLUTION INTRAMUSCULAR; INTRAVENOUS at 09:33

## 2022-10-03 RX ADMIN — DEXTROSE MONOHYDRATE 25 ML/HR: 50 INJECTION, SOLUTION INTRAVENOUS at 10:38

## 2022-10-03 RX ADMIN — IRINOTECAN HYDROCHLORIDE 252 MG: 20 INJECTION, SOLUTION INTRAVENOUS at 10:45

## 2022-10-03 RX ADMIN — POTASSIUM CHLORIDE 40 MEQ: 750 TABLET, FILM COATED, EXTENDED RELEASE ORAL at 10:40

## 2022-10-03 RX ADMIN — SODIUM CHLORIDE 25 ML/HR: 9 INJECTION, SOLUTION INTRAVENOUS at 09:33

## 2022-10-03 RX ADMIN — PALONOSETRON HYDROCHLORIDE 0.25 MG: 0.25 INJECTION INTRAVENOUS at 09:26

## 2022-10-03 NOTE — PROGRESS NOTES
[facility] VISIT NOTE  Date: October 3, 2022    Name: Lucinda Luna. MRN: [id number]         : 1975    Mr. Yvonne Palmer Arrived ambulatory and in no distress for C9D1 of  Folfoxiri Regimen. Assessment was completed by Mandeep Mendoza RN, no acute issues at this time, no new complaints voiced. Chest wall port accessed without difficulty by the assessment nurse, labs drawn & sent for processing. Do you have any symptoms of COVID-19? SOB, coughing, fever, or generally not feeling well NO    2. Have you been exposed to COVID-19 recently? NO    3. Have you had any recent contact with family/friend that has a pending COVID test? NO       Chemotherapy Flowsheet 10/3/2022   Cycle C9D1   Date 10/3/2022   Drug / Regimen Folfoxiri   Post Hydration -   Pre Meds given   Notes given           Mr. Giovani Lewis vitals were reviewed. Patient Vitals for the past 12 hrs:   Temp Pulse Resp BP SpO2   10/03/22 1500 98.2 °F (36.8 °C) 67 -- 117/74 97 %   10/03/22 0745 98.1 °F (36.7 °C) 72 16 (!) 142/83 100 %         Lab results were obtained and reviewed. Recent Results (from the past 12 hour(s))   METABOLIC PANEL, COMPREHENSIVE    Collection Time: 10/03/22  7:50 AM   Result Value Ref Range    Sodium 140 136 - 145 mmol/L    Potassium 3.3 (L) 3.5 - 5.1 mmol/L    Chloride 108 97 - 108 mmol/L    CO2 27 21 - 32 mmol/L    Anion gap 5 5 - 15 mmol/L    Glucose 123 (H) 65 - 100 mg/dL    BUN 13 6 - 20 MG/DL    Creatinine 0.85 0.70 - 1.30 MG/DL    BUN/Creatinine ratio 15 12 - 20      GFR est AA >60 >60 ml/min/1.73m2    GFR est non-AA >60 >60 ml/min/1.73m2    Calcium 9.2 8.5 - 10.1 MG/DL    Bilirubin, total 0.4 0.2 - 1.0 MG/DL    ALT (SGPT) 40 12 - 78 U/L    AST (SGOT) 29 15 - 37 U/L    Alk.  phosphatase 253 (H) 45 - 117 U/L    Protein, total 6.2 (L) 6.4 - 8.2 g/dL    Albumin 3.1 (L) 3.5 - 5.0 g/dL    Globulin 3.1 2.0 - 4.0 g/dL    A-G Ratio 1.0 (L) 1.1 - 2.2     CEA    Collection Time: 10/03/22  7:50 AM   Result Value Ref Range    CEA 10.1 ng/mL CBC WITH AUTOMATED DIFF    Collection Time: 10/03/22  8:25 AM   Result Value Ref Range    WBC 3.0 (L) 4.1 - 11.1 K/uL    RBC 3.44 (L) 4.10 - 5.70 M/uL    HGB 11.7 (L) 12.1 - 17.0 g/dL    HCT 35.4 (L) 36.6 - 50.3 %    .9 (H) 80.0 - 99.0 FL    MCH 34.0 26.0 - 34.0 PG    MCHC 33.1 30.0 - 36.5 g/dL    RDW 15.8 (H) 11.5 - 14.5 %    PLATELET 77 (L) 460 - 400 K/uL    MPV 10.4 8.9 - 12.9 FL    NRBC 0.0 0  WBC    ABSOLUTE NRBC 0.00 0.00 - 0.01 K/uL    NEUTROPHILS 40 32 - 75 %    LYMPHOCYTES 35 12 - 49 %    MONOCYTES 15 (H) 5 - 13 %    EOSINOPHILS 9 (H) 0 - 7 %    BASOPHILS 1 0 - 1 %    IMMATURE GRANULOCYTES 0 0.0 - 0.5 %    ABS. NEUTROPHILS 1.1 (L) 1.8 - 8.0 K/UL    ABS. LYMPHOCYTES 1.1 0.8 - 3.5 K/UL    ABS. MONOCYTES 0.5 0.0 - 1.0 K/UL    ABS. EOSINOPHILS 0.3 0.0 - 0.4 K/UL    ABS. BASOPHILS 0.0 0.0 - 0.1 K/UL    ABS. IMM.  GRANS. 0.0 0.00 - 0.04 K/UL    DF SMEAR SCANNED      RBC COMMENTS MACROCYTOSIS  1+        RBC COMMENTS ANISOCYTOSIS  1+           Medications received:  Medications Administered       0.9% sodium chloride infusion       Admin Date  10/03/2022 Action  New Bag Dose  25 mL/hr Rate  25 mL/hr Route  IntraVENous Administered By  Sienna Leonard RN              dexamethasone (DECADRON) 12 mg in 0.9% sodium chloride 50 mL IVPB       Admin Date  10/03/2022 Action  New Bag Dose  12 mg Route  IntraVENous Administered By  Sienna Leonard RN              dextrose 5% infusion       Admin Date  10/03/2022 Action  New Bag Dose  25 mL/hr Rate  25 mL/hr Route  IntraVENous Administered By  Sienna Leonard RN              fluorouraciL (ADRUCIL) 4,080 mg in 0.9% sodium chloride 100 mL CADD Cassette       Admin Date  10/03/2022 Action  New Bag Dose  4,080 mg Rate  2.1 mL/hr Route  IntraVENous Administered By  Sienna Leonard RN              irinotecan (CAMPTOSAR) 252 mg in dextrose 5% 250 mL, overfill volume 25 mL chemo infusion       Admin Date  10/03/2022 Action  New Bag Dose  252 mg Rate  191.7 mL/hr Route  IntraVENous Administered By  Sienna Leonard RN              leucovorin (WELLCOVORIN) 680 mg in dextrose 5% 250 mL, overfill volume 25 mL IVPB       Admin Date  10/03/2022 Action  New Bag Dose  680 mg Rate  154.5 mL/hr Route  IntraVENous Administered By  Sienna Leonard RN              oxaliplatin (ELOXATIN) 144.5 mg in dextrose 5% 250 mL, overfill volume 25 mL chemo infusion       Admin Date  10/03/2022 Action  New Bag Dose  144.5 mg Rate  152 mL/hr Route  IntraVENous Administered By  Sienna Leonard RN              palonosetron HCl (ALOXI) injection 0.25 mg       Admin Date  10/03/2022 Action  Given Dose  0.25 mg Route  IntraVENous Administered By  Sienna Leonard RN              potassium chloride SR (KLOR-CON 10) tablet 40 mEq       Admin Date  10/03/2022 Action  Given Dose  40 mEq Route  Oral Administered By  Sienna Leonard RN                     Two nurses verified prior to administering:    Drug name  Drug dose  Infusion volume or drug volume when prepared in a syringe  Rate of administration  Route of administration  Expiration dates and/or times  Appearance and physical integrity of the drugs  Rate set on infusion pump, when used  Sequencing of drug administration        Mr. Sathya Cassidy tolerated treatment well and was discharged from [place] [address] in stable condition at 1500. Port flushed and connected to infusing CADD pump. He is to return on  October 5, 2022 at 1330 for his next appointment.     Maria De Jesus Lin RN  October 3, 2022    Future Appointments:  Future Appointments   Date Time Provider Dahlia Epps   10/5/2022  1:30 PM [facility] INF5 CH4 [facility]   10/10/2022  3:00 PM [facility] CT 1 SFMRCT [facility]   10/11/2022  9:30 AM Reina Dorman MD PCS BS AMB   10/17/2022  7:30 AM SS INF2 CH2 >7H [facility]   10/17/2022  8:15 AM Guerline Rosas MD [facility] BS AMB   10/19/2022  2:30 PM SS INF5 CH4 <1H [facility]   10/31/2022  7:30 AM SS INF2 CH2 >7H [facility]   11/2/2022  1:30 PM SS INF5 CH4 <1H [facility]

## 2022-10-05 ENCOUNTER — HOSPITAL ENCOUNTER (OUTPATIENT)
Dept: INFUSION THERAPY | Age: 47
Discharge: HOME | End: 2022-10-05
Payer: MEDICAID

## 2022-10-05 VITALS
HEART RATE: 79 BPM | TEMPERATURE: 98.6 F | DIASTOLIC BLOOD PRESSURE: 76 MMHG | RESPIRATION RATE: 16 BRPM | OXYGEN SATURATION: 99 % | SYSTOLIC BLOOD PRESSURE: 122 MMHG

## 2022-10-05 DIAGNOSIS — C82.90 FOLLICULAR LYMPHOMA, UNSPECIFIED FOLLICULAR LYMPHOMA TYPE, UNSPECIFIED BODY REGION (HCC): ICD-10-CM

## 2022-10-05 DIAGNOSIS — D70.1 CHEMOTHERAPY INDUCED NEUTROPENIA (HCC): Primary | ICD-10-CM

## 2022-10-05 DIAGNOSIS — Z76.89 PREVENTION OF CHEMOTHERAPY-INDUCED NEUTROPENIA: ICD-10-CM

## 2022-10-05 DIAGNOSIS — T45.1X5A CHEMOTHERAPY INDUCED NEUTROPENIA (HCC): Primary | ICD-10-CM

## 2022-10-05 DIAGNOSIS — C18.9 COLON ADENOCARCINOMA (HCC): ICD-10-CM

## 2022-10-05 PROCEDURE — 96523 IRRIG DRUG DELIVERY DEVICE: CPT

## 2022-10-05 PROCEDURE — 74011000250 HC RX REV CODE- 250: Performed by: INTERNAL MEDICINE

## 2022-10-05 PROCEDURE — 74011250636 HC RX REV CODE- 250/636: Performed by: INTERNAL MEDICINE

## 2022-10-05 RX ORDER — HEPARIN 100 UNIT/ML
300-500 SYRINGE INTRAVENOUS AS NEEDED
Status: DISCONTINUED | OUTPATIENT
Start: 2022-10-05 | End: 2022-10-06 | Stop reason: HOSPADM

## 2022-10-05 RX ORDER — SODIUM CHLORIDE 0.9 % (FLUSH) 0.9 %
10 SYRINGE (ML) INJECTION AS NEEDED
Status: DISCONTINUED | OUTPATIENT
Start: 2022-10-05 | End: 2022-10-06 | Stop reason: HOSPADM

## 2022-10-05 RX ORDER — SODIUM CHLORIDE 9 MG/ML
10 INJECTION INTRAMUSCULAR; INTRAVENOUS; SUBCUTANEOUS AS NEEDED
Status: DISCONTINUED | OUTPATIENT
Start: 2022-10-05 | End: 2022-10-06 | Stop reason: HOSPADM

## 2022-10-05 RX ADMIN — HEPARIN 500 UNITS: 100 SYRINGE at 15:08

## 2022-10-05 RX ADMIN — Medication 10 ML: at 15:08

## 2022-10-05 NOTE — PROGRESS NOTES
[facility] Progress Note    Date: 2022    Name: Kayden Bertrand. MRN: [id number]         : 1975:            Mr. Jaime Hogan arrived ambulatory and in no distress for Pump Removal.  Assessment was completed, no acute issues at this time,pt c/o chronic abd pain. CADD completed in infusion center- 100 ml infused per order. Mr. Juancarlos Ward vitals were reviewed. Visit Vitals  /76   Pulse 79   Temp 98.6 °F (37 °C)   Resp 16   SpO2 99%     Medications Administered       heparin (porcine) pf 300-500 Units       Admin Date  10/05/2022 Action  Given Dose  500 Units Route  InterCATHeter Administered By  Sophia Roldan RN              sodium chloride (NS) flush 10 mL       Admin Date  10/05/2022 Action  Given Dose  10 mL Route  IntraVENous Administered By  Sophia Roldan RN                    Mr. Jaime Hogan tolerated treatment well and was discharged from [place] [address] in stable condition. Port de-accessed, flushed & heparinized per protocol.     Future Appointments   Date Time Provider Dahlia Epps   10/10/2022  3:00 PM [facility] CT 1 SFMRCT [facility]   10/11/2022  9:30 AM Kesha Nieto MD PCS BS AMB   10/17/2022  7:30 AM SS INF2 CH2 >7H [facility]   10/17/2022  8:15 AM Betito Rao MD [facility] BS AMB   10/19/2022  2:30 PM SS INF5 CH4 <1H [facility]   10/31/2022  7:30 AM SS INF2 CH2 >7H [facility]   2022  1:30 PM SS INF5 CH4 <1H [facility] Constantino Everett RN  2022

## 2022-10-07 ENCOUNTER — TELEPHONE (OUTPATIENT)
Dept: PALLATIVE CARE | Age: 47
End: 2022-10-07

## 2022-10-10 ENCOUNTER — HOSPITAL ENCOUNTER (OUTPATIENT)
Dept: CT IMAGING | Age: 47
Discharge: HOME | End: 2022-10-10
Attending: NURSE PRACTITIONER
Payer: MEDICAID

## 2022-10-10 ENCOUNTER — APPOINTMENT (OUTPATIENT)
Dept: INFUSION THERAPY | Age: 47
End: 2022-10-10
Payer: MEDICAID

## 2022-10-10 ENCOUNTER — APPOINTMENT (OUTPATIENT)
Dept: INFUSION THERAPY | Age: 47
End: 2022-10-10

## 2022-10-10 DIAGNOSIS — C18.4 CARCINOMA OF TRANSVERSE COLON (HCC): ICD-10-CM

## 2022-10-10 PROCEDURE — 74011000636 HC RX REV CODE- 636: Performed by: NURSE PRACTITIONER

## 2022-10-10 PROCEDURE — 74177 CT ABD & PELVIS W/CONTRAST: CPT

## 2022-10-10 RX ORDER — DEXTROSE MONOHYDRATE 50 MG/ML
25 INJECTION, SOLUTION INTRAVENOUS CONTINUOUS
Status: CANCELLED | OUTPATIENT
Start: 2022-10-17

## 2022-10-10 RX ORDER — HEPARIN 100 UNIT/ML
300-500 SYRINGE INTRAVENOUS AS NEEDED
Status: CANCELLED | OUTPATIENT
Start: 2022-10-17

## 2022-10-10 RX ORDER — SODIUM CHLORIDE 0.9 % (FLUSH) 0.9 %
10 SYRINGE (ML) INJECTION AS NEEDED
Status: CANCELLED | OUTPATIENT
Start: 2022-10-26

## 2022-10-10 RX ORDER — ATROPINE SULFATE 0.4 MG/ML
0.4 INJECTION, SOLUTION ENDOTRACHEAL; INTRAMEDULLARY; INTRAMUSCULAR; INTRAVENOUS; SUBCUTANEOUS
Status: CANCELLED | OUTPATIENT
Start: 2022-10-17

## 2022-10-10 RX ORDER — ONDANSETRON 2 MG/ML
8 INJECTION INTRAMUSCULAR; INTRAVENOUS AS NEEDED
Status: CANCELLED | OUTPATIENT
Start: 2022-10-17

## 2022-10-10 RX ORDER — SODIUM CHLORIDE 9 MG/ML
10 INJECTION INTRAMUSCULAR; INTRAVENOUS; SUBCUTANEOUS AS NEEDED
Status: CANCELLED | OUTPATIENT
Start: 2022-10-17

## 2022-10-10 RX ORDER — EPINEPHRINE 1 MG/ML
0.3 INJECTION, SOLUTION, CONCENTRATE INTRAVENOUS AS NEEDED
Status: CANCELLED | OUTPATIENT
Start: 2022-10-17

## 2022-10-10 RX ORDER — HEPARIN 100 UNIT/ML
300-500 SYRINGE INTRAVENOUS AS NEEDED
Status: CANCELLED | OUTPATIENT
Start: 2022-10-26

## 2022-10-10 RX ORDER — SODIUM CHLORIDE 0.9 % (FLUSH) 0.9 %
10 SYRINGE (ML) INJECTION AS NEEDED
Status: CANCELLED | OUTPATIENT
Start: 2022-10-17

## 2022-10-10 RX ORDER — HEPARIN 100 UNIT/ML
500 SYRINGE INTRAVENOUS
Status: DISCONTINUED | OUTPATIENT
Start: 2022-10-10 | End: 2022-10-11 | Stop reason: HOSPADM

## 2022-10-10 RX ORDER — SODIUM CHLORIDE 9 MG/ML
10 INJECTION INTRAMUSCULAR; INTRAVENOUS; SUBCUTANEOUS AS NEEDED
Status: CANCELLED | OUTPATIENT
Start: 2022-10-26

## 2022-10-10 RX ORDER — SODIUM CHLORIDE 9 MG/ML
25 INJECTION, SOLUTION INTRAVENOUS CONTINUOUS
Status: CANCELLED | OUTPATIENT
Start: 2022-10-17

## 2022-10-10 RX ORDER — DIPHENHYDRAMINE HYDROCHLORIDE 50 MG/ML
50 INJECTION, SOLUTION INTRAMUSCULAR; INTRAVENOUS AS NEEDED
Status: CANCELLED
Start: 2022-10-17

## 2022-10-10 RX ORDER — HYDROCORTISONE SODIUM SUCCINATE 100 MG/2ML
100 INJECTION, POWDER, FOR SOLUTION INTRAMUSCULAR; INTRAVENOUS AS NEEDED
Status: CANCELLED | OUTPATIENT
Start: 2022-10-17

## 2022-10-10 RX ORDER — PALONOSETRON 0.05 MG/ML
0.25 INJECTION, SOLUTION INTRAVENOUS ONCE
Status: CANCELLED | OUTPATIENT
Start: 2022-10-17 | End: 2022-10-24

## 2022-10-10 RX ORDER — ALBUTEROL SULFATE 0.83 MG/ML
2.5 SOLUTION RESPIRATORY (INHALATION) AS NEEDED
Status: CANCELLED
Start: 2022-10-17

## 2022-10-10 RX ORDER — DIPHENHYDRAMINE HYDROCHLORIDE 50 MG/ML
25 INJECTION, SOLUTION INTRAMUSCULAR; INTRAVENOUS AS NEEDED
Status: CANCELLED
Start: 2022-10-17

## 2022-10-10 RX ORDER — ACETAMINOPHEN 325 MG/1
650 TABLET ORAL AS NEEDED
Status: CANCELLED
Start: 2022-10-17

## 2022-10-10 RX ADMIN — IOPAMIDOL 100 ML: 755 INJECTION, SOLUTION INTRAVENOUS at 15:02

## 2022-10-11 ENCOUNTER — OFFICE VISIT (OUTPATIENT)
Dept: PALLATIVE CARE | Age: 47
End: 2022-10-11
Payer: MEDICAID

## 2022-10-11 DIAGNOSIS — R63.0 POOR APPETITE: ICD-10-CM

## 2022-10-11 DIAGNOSIS — F41.9 ANXIETY: ICD-10-CM

## 2022-10-11 DIAGNOSIS — M79.601 RIGHT ARM PAIN: ICD-10-CM

## 2022-10-11 DIAGNOSIS — C78.6 PERITONEAL CARCINOMATOSIS (HCC): ICD-10-CM

## 2022-10-11 DIAGNOSIS — R53.83 OTHER FATIGUE: ICD-10-CM

## 2022-10-11 DIAGNOSIS — R10.84 ABDOMINAL PAIN, GENERALIZED: Primary | ICD-10-CM

## 2022-10-11 DIAGNOSIS — F32.9 REACTIVE DEPRESSION: ICD-10-CM

## 2022-10-11 DIAGNOSIS — C18.3 MALIGNANT NEOPLASM OF HEPATIC FLEXURE (HCC): ICD-10-CM

## 2022-10-11 PROCEDURE — 99214 OFFICE O/P EST MOD 30 MIN: CPT | Performed by: INTERNAL MEDICINE

## 2022-10-11 RX ORDER — HEPARIN 100 UNIT/ML
300-500 SYRINGE INTRAVENOUS AS NEEDED
OUTPATIENT
Start: 2022-11-09

## 2022-10-11 RX ORDER — HYDROCORTISONE SODIUM SUCCINATE 100 MG/2ML
100 INJECTION, POWDER, FOR SOLUTION INTRAMUSCULAR; INTRAVENOUS AS NEEDED
OUTPATIENT
Start: 2022-11-07

## 2022-10-11 RX ORDER — SODIUM CHLORIDE 0.9 % (FLUSH) 0.9 %
10 SYRINGE (ML) INJECTION AS NEEDED
OUTPATIENT
Start: 2022-11-09

## 2022-10-11 RX ORDER — DIPHENHYDRAMINE HYDROCHLORIDE 50 MG/ML
25 INJECTION, SOLUTION INTRAMUSCULAR; INTRAVENOUS AS NEEDED
Start: 2022-11-07

## 2022-10-11 RX ORDER — DEXTROSE MONOHYDRATE 50 MG/ML
25 INJECTION, SOLUTION INTRAVENOUS CONTINUOUS
OUTPATIENT
Start: 2022-11-07

## 2022-10-11 RX ORDER — ONDANSETRON 2 MG/ML
8 INJECTION INTRAMUSCULAR; INTRAVENOUS AS NEEDED
OUTPATIENT
Start: 2022-11-07

## 2022-10-11 RX ORDER — DIPHENHYDRAMINE HYDROCHLORIDE 50 MG/ML
50 INJECTION, SOLUTION INTRAMUSCULAR; INTRAVENOUS AS NEEDED
Start: 2022-11-07

## 2022-10-11 RX ORDER — SODIUM CHLORIDE 9 MG/ML
10 INJECTION INTRAMUSCULAR; INTRAVENOUS; SUBCUTANEOUS AS NEEDED
OUTPATIENT
Start: 2022-11-07

## 2022-10-11 RX ORDER — HEPARIN 100 UNIT/ML
300-500 SYRINGE INTRAVENOUS AS NEEDED
OUTPATIENT
Start: 2022-11-07

## 2022-10-11 RX ORDER — ALBUTEROL SULFATE 0.83 MG/ML
2.5 SOLUTION RESPIRATORY (INHALATION) AS NEEDED
Start: 2022-11-07

## 2022-10-11 RX ORDER — ATROPINE SULFATE 0.4 MG/ML
0.4 INJECTION, SOLUTION ENDOTRACHEAL; INTRAMEDULLARY; INTRAMUSCULAR; INTRAVENOUS; SUBCUTANEOUS
OUTPATIENT
Start: 2022-11-07

## 2022-10-11 RX ORDER — ACETAMINOPHEN 325 MG/1
650 TABLET ORAL AS NEEDED
Start: 2022-11-07

## 2022-10-11 RX ORDER — SODIUM CHLORIDE 0.9 % (FLUSH) 0.9 %
10 SYRINGE (ML) INJECTION AS NEEDED
OUTPATIENT
Start: 2022-11-07

## 2022-10-11 RX ORDER — PALONOSETRON 0.05 MG/ML
0.25 INJECTION, SOLUTION INTRAVENOUS ONCE
OUTPATIENT
Start: 2022-11-07 | End: 2022-10-31

## 2022-10-11 RX ORDER — SODIUM CHLORIDE 9 MG/ML
25 INJECTION, SOLUTION INTRAVENOUS CONTINUOUS
OUTPATIENT
Start: 2022-11-07

## 2022-10-11 RX ORDER — EPINEPHRINE 1 MG/ML
0.3 INJECTION, SOLUTION, CONCENTRATE INTRAVENOUS AS NEEDED
OUTPATIENT
Start: 2022-11-07

## 2022-10-11 RX ORDER — SODIUM CHLORIDE 9 MG/ML
10 INJECTION INTRAMUSCULAR; INTRAVENOUS; SUBCUTANEOUS AS NEEDED
OUTPATIENT
Start: 2022-11-09

## 2022-10-11 NOTE — PATIENT INSTRUCTIONS
Dear Ricarda Chen. ,    It was a pleasure seeing you today via virtual visit    We will see you again in 6 weeks for an office visit to coordinate with your infusion. If labs or imaging tests have been ordered for you today, please call the office  at [phone] 48 hours after completion to obtain the results. Your described symptoms were: Fatigue: 4 Drowsiness: 4   Depression: 1 Pain: 5   Anxiety: 8 Nausea: 1   Anorexia: 1 Dyspnea: 1   Best Well-Being: 3 Constipation: Yes (Rated a 1)   Other Problem (Comment): 0       This is the plan we talked about:    1. Abdominal pain  -Continue oxycodone 10-mg every 4 hours as needed  -Continue extended-release oxycodone 10-mg every 12 hours    2. Right arm pain  -The etiology (cause) of this pain is unclear at this point  -This continues to come and go  -Please follow-up with scheduling an appointment with your cardiologist    3. Depression/anxiety  -You have ongoing anxiety which is chronic and unchanged  -You've met with our clinical , Gio Caruso, in the past  -Continue Lexapro 20-mg daily  -I'm avoiding benzodiazepines due to synergistic effect with opioids    4. Poor appetite/weight loss  -Your appetite is good now  -You've gained 10 pounds in the past 6 weeks     5. Fatigue  -This is chronic and unchanged   -You're sleeping about 5 hours at night    6. Nausea  -Continue ondansetron 8-mg every 8 hours as needed  -Continue prochlorperazine 10-mg every 6 hours as needed    7.  Colon mass  -You're receiving chemotherapy under the care of Dr. Jenice Duane  -Your scans on 10/10/22 showed mixed response  -Follow-up with Dr. Jenice Duane as scheduled on 10/17/22    This is what you have shared with us about Advance Care Planning:      Primary Decision Maker: Radha Mohamud - Girlfriend - [phone]  Advance Care Planning 10/11/2022   Patient's [address] is: Legal Next of [facility] [address] Directive None   Patient Would Like to Complete Advance Directive -   Does the patient have other document types -           The Palliative Medicine Team is here to support you and your family.        Sincerely,      Orion Dacosta MD and the Palliative Medicine Team

## 2022-10-11 NOTE — PROGRESS NOTES
Palliative Medicine Office Visit  Palliative Medicine Nurse Check In  [phone]    Patient Name: Lucinda Luna. YOB: 1975      Date of Office Visit: 10/11/2022    Patient states: \"  \"    From Check In Sheet (scanned in Media):  Has a medical provider talked with you about cardiopulmonary resuscitation (CPR)? [x] Yes   [] No   [] Unable to obtain    Nurse reminder to complete or update ACP FlowSheet:    Is ACP on the Problem List?    [] Yes    [x] No  IF ACP Document is ON FILE; Nurse to place ACP on Problem List     Is there an ACP Note in Chart Review/Note? [x] Yes    [] No   If NO: ALERT PROVIDER       Primary Decision Maker: Celina Terrazas Cris Girlfriend - [phone]  Advance Care Planning 10/11/2022   Patient's Healthcare Decision Maker is: Legal Next of Kin   Confirm Advance Directive None   Patient Would Like to Complete Advance Directive -   Does the patient have other document types -          Is there anything that we should know about you as a person in order to provide you the best care possible? Have you been to the ER, urgent care clinic since your last visit? [] Yes   [x] No   [] Unable to obtain    Have you been hospitalized since your last visit? [] Yes   [x] No   [] Unable to obtain    Have you seen or consulted any other health care providers outside of the [address] [place] [address] since your last visit? [] Yes   [x] No   [] Unable to obtain    Functional status (describe):         Last BM: 10/11/2022     accessed (date):      Bottle review (for opioid pain medication):  Medication 1:   Date filled:   Directions:   # filled:   # left:   # pills taking per day:  Last dose taken:    Medication 2:   Date filled:   Directions:   # filled:   # left:   # pills taking per day:  Last dose taken:    Medication 3:   Date filled:   Directions:   # filled:   # left:   # pills taking per day:  Last dose taken:    Medication 4:   Date filled:   Directions: # filled:   # left:   # pills taking per day:  Last dose taken:

## 2022-10-11 NOTE — PROGRESS NOTES
Palliative Medicine Outpatient Services  [place]: [phone] ([phone])    Patient Name: Jose Luis Doss. YOB: 1975     Date of Current Visit: 10/11/22   Location of Current Visit:    [] [facility] Office  [] [facility] Office  [] [address] [place] [address]  [] Home  [x] Other:  televisit    Date of Initial Visit: 2/19/19   Referral from: Simi Jimenez MD  Primary Care Physician: Rosa Gutierrez MD      SUMMARY:   Jose Luis Goff is a 41y.o. year old with high-grade follicular lymphoma, who was referred to Palliative Medicine by Dr. Edwar Graves for symptom management and supportive care. He was diagnosed in 11/2018 after presenting with back pain, treated with systemic chemotherapy with complete response. He suffered cardiac arrest during cycle #3 due to previously undiagnosed severe stenosis of the LAD s/p PTCA and stent. He was diagnosed with poorly differentiated carcinoma of the colon (no evidence of metastatic disease) in 2/14/22 and underwent loop ileostomy at [facility] on 2/19/22. He underwent exploratory laparoscopy in 4/2022 which revealed a large tumor at the hepatic flexure peritoneal carcinomatosis. He is currently being treated with systemic chemotherapy under the care of Dr. Jessika Rizzo. The patients social history includes: he is single. He lives in [facility] with his girlfriend, Kristyn Gleason, and their 12-month old son. Suha Bethea He has 3 teenage children who live with their mother and with whom he has close contact. 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 Palliative Medicine is addressing the following current patient/family concerns: abdominal pain related to malignancy; anxiety, depression; left mid- and low back pain, poor appetite, fatigue, advanced care planning. Initial Referral Intake note from Masoud Jones RN reviewed prior to visit   PALLIATIVE DIAGNOSES:       ICD-10-CM ICD-9-CM    1. Abdominal pain, generalized  R10.84 789.07       2.  Right arm pain  M79.601 729.5 3. Anxiety  F41.9 300.00       4. Reactive depression  F32.9 300.4       5. Poor appetite  R63.0 783.0       6. Other fatigue  R53.83 780.79       7. Malignant neoplasm of hepatic flexure (HCC)  C18.3 153.0       8. Peritoneal carcinomatosis ([facility] [address]  C78.6 197.6            PLAN:     Patient Instructions   Dear Ashley Barroso. ,    It was a pleasure seeing you today via virtual visit    We will see you again in 6 weeks for an office visit to coordinate with your infusion. If labs or imaging tests have been ordered for you today, please call the office  at [phone] 48 hours after completion to obtain the results. Your described symptoms were: Fatigue: 4 Drowsiness: 4   Depression: 1 Pain: 5   Anxiety: 8 Nausea: 1   Anorexia: 1 Dyspnea: 1   Best Well-Being: 3 Constipation: Yes (Rated a 1)   Other Problem (Comment): 0       This is the plan we talked about:    1. Abdominal pain  -Continue oxycodone 10-mg every 4 hours as needed  -Continue extended-release oxycodone 10-mg every 12 hours    2. Right arm pain  -The etiology (cause) of this pain is unclear at this point  -This continues to come and go  -Please follow-up with scheduling an appointment with your cardiologist    3. Depression/anxiety  -You have ongoing anxiety which is chronic and unchanged  -You've met with our clinical , Ashley Melgar, in the past  -Continue Lexapro 20-mg daily  -I'm avoiding benzodiazepines due to synergistic effect with opioids    4. Poor appetite/weight loss  -Your appetite is good now  -You've gained 10 pounds in the past 6 weeks     5. Fatigue  -This is chronic and unchanged   -You're sleeping about 5 hours at night    6. Nausea  -Continue ondansetron 8-mg every 8 hours as needed  -Continue prochlorperazine 10-mg every 6 hours as needed    7.  Colon mass  -You're receiving chemotherapy under the care of Dr. Jose Rivero  -Your scans on 10/10/22 showed mixed response  -Follow-up with Dr. Jose Rivero as scheduled on 10/17/22    This is what you have shared with us about Advance Care Planning:      Primary Decision Maker: Devon De La Fuenteienangelique - [phone]  Advance Care Planning 10/11/2022   Patient's Healthcare Decision Maker is: Legal Next of Kin   Confirm Advance Directive None   Patient Would Like to Complete Advance Directive -   Does the patient have other document types -           The Palliative Medicine Team is here to support you and your family. Sincerely,      Destiny Love MD and the Palliative Medicine Team     GOALS OF CARE / TREATMENT PREFERENCES:   [====Goals of Care====]  GOALS OF CARE:  Patient / health care proxy stated goals: See Patient Instructions / Summary    TREATMENT PREFERENCES:   Code Status:  [x] Attempt Resuscitation       [] Do Not Attempt Resuscitation    Advance Care Planning:  [x] The Pall Med Interdisciplinary Team has updated the ACP Navigator with Decision Maker and Patient Capacity      Primary Decision Maker: Devon Bermeo - [phone]    [] Named in a scanned document   [x] Legal Next of Kin  [] Guardian    Other:  (If patient appropriate for POST, consider using PALLPOST smart phrase here)    The palliative care team has discussed with patient / health care proxy about goals of care / treatment preferences for patient.  [====Goals of Care====]     PRESCRIPTIONS GIVEN:     No orders of the defined types were placed in this encounter.               FOLLOW UP:     Future Appointments   Date Time Provider Dahlia Epps   10/17/2022  7:30 AM SS INF2 CH2 >7H [facility]   10/17/2022  8:15 AM Hazel Lawrence MD [facility]   10/19/2022  2:30 PM SS INF5 CH4 <1H [facility]   10/31/2022  7:30 AM SS INF2 CH2 >7H [facility]   11/2/2022  1:30 PM [facility]           PHYSICIANS INVOLVED IN CARE:   Patient Care Team:  Moon Ramirez MD as PCP - General (Family Medicine)  Hazel Lawrence MD (Hematology and Oncology)  Bianca Willingham MD as Physician (Palliative Medicine)  Bianca Willingham MD as Physician (Palliative Medicine)       HISTORY:   Reviewed patient-completed ESAS and advance care planning form. Reviewed patient record in prescription monitoring program.    CHIEF COMPLAINT:   Chief Complaint   Patient presents with    Abdominal Pain             HPI/SUBJECTIVE:    The patient is: [x] Verbal / [] Nonverbal     He feels his pain is under better control after starting extended-release oxycodone. He's having less pain when he wakes up. He sleeps throughout the night, waking up once or twice to go to the bathroom. He's taking immediate-release oxycodone very 4 to 5 hours during the day. He sometimes takes an oxycodone when he wakes up at night but not always. See Plan/Patient Instructions for additional interval history      From visit 3/2/22:  He started having abdominal pain about a month ago. Then he started feeling nauseous. He had trouble with bowel movements, feeling like he wasn't completing emptying his bowels. He went to the ED on 2/14, CT scan showed mass in his colon. He was hospitalized at [facility] 2/15-2/25 for colonoscopy and loop ileostomy. He's still having pain in his abdomen. He's been taking 2 oxycodone every 4 to 6 hours which eases the pain to ~5/10 which is tolerable. He's always anxious. He continues to take Lexapro. He's eating, not as much as he used to. He ate 2 PBJs yesterday. He had mild nausea for a few days after he came home. He's passing formed stool in his ostomy bag daily. Home health is coming to his home. He sees Dr. Love Menon next week. From IV 2/19/19:  He has a lot of anxiety. He's lived with anxiety for a long time, sometimes it's been worse than others, like when he lost his job and lost his insurance. He took Wellbutrin then which made him more anxious and depressed. He's had more anxiety since he was diagnosed with lymphoma.  He's been taking lorazepam and it doesn't help at all, even when he tried taking 2 pills. This is his biggest problem now. He feels depressed but it's not as bad as the anxiety. He has pain in his back, that's what brought him to the hospital in November. He's been taking oxycodone which helps some. The groin pain started after his operation (cardiac cath) in the hospital last week. He expects that will get better as it heals. His pain doesn't bother him too much, not like the anxiety. His appetite is getting better. He's lost ~30# since last fall but that's leveled off now. He's moving his bowels regularly. He sometimes gets short of breath but not as much as used to. He doesn't have chest pain, never did. He sleeps well at night. He doesn't have the energy to do much during the day. He lives with his father. He sees his three teen-age children frequently (they live with their mother). His children give him a lot of strength.         Clinical Pain Assessment (nonverbal scale for nonverbal patients):   [++++ Clinical Pain Assessment++++]  [++++Pain Severity++++]: Pain: 5  [++++Pain Character++++]: stabbing pain in back  [++++Pain Duration++++]: months for back pain, weeks for groin pain  [++++Pain Effect++++]: little  [++++Pain Factors++++]: oxycodone helps with back pain, groin pain elicited by standing and walking  [++++Pain Frequency++++]: constant back pain with varying intensity  [++++Pain Location++++]: left lower back  [++++ Clinical Pain Assessment++++]    [++++ Clinical Pain Assessment++++]  [++++Pain Severity++++]: Pain: 5  [++++Pain Character++++]: deep, aching  [++++Pain Duration++++]: since 2/2022  [++++Pain Effect++++]: limits function, emotional distress  [++++Pain Factors++++]: unable to identify provoking factors  [++++Pain Frequency++++]: constant  [++++Pain Location++++]: right lower abdomen  [++++ Clinical Pain Assessment++++]         FUNCTIONAL ASSESSMENT:     Palliative Performance Scale (PPS):          PSYCHOSOCIAL/SPIRITUAL SCREENING:     Any spiritual / Scientologist concerns:  [] Yes /  [x] No    Caregiver Burnout:  [] Yes /  [x] No /  [] No Caregiver Present      Anticipatory grief assessment:   [x] Normal  / [] Maladaptive       ESAS Anxiety: Anxiety: 8    ESAS Depression: Depression: 1       REVIEW OF SYSTEMS:     The following systems were [x] reviewed / [] unable to be reviewed  Systems: constitutional, ears/nose/mouth/throat, respiratory, gastrointestinal, genitourinary, musculoskeletal, integumentary, neurologic, psychiatric, endocrine. Positive findings noted below. Modified ESAS Completed by: provider   Fatigue: 4 Drowsiness: 4   Depression: 1 Pain: 5   Anxiety: 8 Nausea: 1   Anorexia: 1 Dyspnea: 1   Best Well-Being: 3 Constipation: Yes (Rated a 1)   Other Problem (Comment): 0          PHYSICAL EXAM:     Wt Readings from Last 3 Encounters:   10/03/22 145 lb 12.8 oz (66.1 kg)   10/03/22 145 lb (65.8 kg)   09/26/22 141 lb 4.8 oz (64.1 kg)     There were no vitals taken for this visit.   Last bowel movement: See Nursing Note       Constitutional    [] Appears well-developed and well-nourished in no apparent distress    [x] Abnormal: appears fatigued  Mental status  [x] Alert and awake  [x] Oriented to person/place/time  [x] Able to follow commands  [] Abnormal:   Eyes  [x] EOM normal   [x] Sclera normal   [x] No visible ocular discharge  [] Abnormal:   HENT  [x] Normocephalic, atraumatic  [x] Mouth/Throat: Moist mucous membranes   [x] External Ears normal  [] Abnormal:  Neck  [x] No visualized mass  [] Abnormal:  Pulmonary/Chest   [x] Respiratory effort normal  [x] No visualized signs of difficulty breathing or respiratory distress  [] Abnormal:  Musculoskeletal  [x] Normal gait with no signs of ataxia  [x] Normal range of motion of neck  [] Abnormal:  Neurological:   [x] No facial asymmetry (Cranial nerve 7 motor function)  [x] No gaze palsy  [] Abnormal:   Skin  [x] No significant exanthematous lesions or discoloration noted on facial skin  [] Abnormal:                                  Psychiatric  [x] Normal affect  [x] No hallucinations  [] Abnormal:    Other pertinent observable physical exam findings:    Due to this being a TeleHealth evaluation, many elements of the physical examination are unable to be assessed. HISTORY:     Past Medical History:   Diagnosis Date    Anxiety     Anxiety and depression     Cancer [facility])     lymphoma Nov 2018 receiving chemo    Cancer ([facility] [address] 02/2022    COLON    Chronic pain     lower back- lymphoma    Hyperlipidemia     Hypertension     NO MEDICATION FOR PAST 9 MONTHS    Lymphadenopathy 11/12/2018    Seizures ([facility] [address] CHILDHOOD    NEVER TOOK MEDICATION - \"GREW OUT OF THEM\"      Past Surgical History:   Procedure Laterality Date    HX COLONOSCOPY      HX HEART CATHETERIZATION  02/2019    HX ILEOSTOMY      HX OTHER SURGICAL  02/2019    cardiac stent    IR INJECTION PSEUDOANEURYSM  2/26/2019    ME ABDOMEN SURGERY PROC UNLISTED  02/2022    COLON RESECTION WITH ILEOSTOMY    ME CARDIAC SURG PROCEDURE UNLIST  2019    STENT X1      Family History   Problem Relation Age of Onset    Hypertension Father     Diabetes Father     Cancer Father         PROSTATE    Diabetes Mother     No Known Problems Brother     No Known Problems Brother     Anesth Problems Neg Hx       History reviewed, no pertinent family history. Social History     Tobacco Use    Smoking status: Every Day     Packs/day: 0.50     Years: 20.00     Pack years: 10.00     Types: Cigarettes    Smokeless tobacco: Never    Tobacco comments:     \"STOP SMOKING\" PACKET GIVEN TO PATIENT   Substance Use Topics    Alcohol use: No     No Known Allergies   Current Outpatient Medications   Medication Sig    oxyCODONE IR (ROXICODONE) 10 mg tab immediate release tablet Take 1 Tablet by mouth every four (4) hours as needed for Pain for up to 15 days.  Max Daily Amount: 60 mg.    oxyCODONE ER (OxyCONTIN) 10 mg ER tablet Take 1 Tablet by mouth every twelve (12) hours for 30 days. Max Daily Amount: 20 mg.    ondansetron hcl (ZOFRAN) 8 mg tablet Take 1 Tablet by mouth every eight (8) hours as needed for Nausea or Vomiting. prochlorperazine (Compazine) 10 mg tablet Take 1 Tablet by mouth every six (6) hours as needed for Nausea or Vomiting. dexAMETHasone (DECADRON) 4 mg tablet Take 8mg (2 x tabs) on days 2 and 3 after treatment to prevent nausea. Take in the AM with food. multivitamin (ONE A DAY) tablet Take 1 Tablet by mouth daily. atorvastatin (LIPITOR) 40 mg tablet TAKE 1 TAB BY MOUTH NIGHTLY. INDICATIONS: TREATMENT TO SLOW PROGRESSION OF CORONARY ARTERY DISEASE    lidocaine-prilocaine (EMLA) topical cream Apply a dime size amount to port site 30 minutes before treatment to prevent pain. (Patient not taking: Reported on 10/11/2022)     No current facility-administered medications for this visit. LAB DATA REVIEWED:     Lab Results   Component Value Date/Time    WBC 3.0 (L) 10/03/2022 08:25 AM    HGB 11.7 (L) 10/03/2022 08:25 AM    PLATELET 77 (L) 71/20/7476 08:25 AM     Lab Results   Component Value Date/Time    Sodium 140 10/03/2022 07:50 AM    Potassium 3.3 (L) 10/03/2022 07:50 AM    Chloride 108 10/03/2022 07:50 AM    CO2 27 10/03/2022 07:50 AM    BUN 13 10/03/2022 07:50 AM    Creatinine 0.85 10/03/2022 07:50 AM    Calcium 9.2 10/03/2022 07:50 AM    Magnesium 1.7 01/12/2019 04:05 AM    Phosphorus 2.0 (L) 01/12/2019 04:05 AM      Lab Results   Component Value Date/Time    Alk.  phosphatase 253 (H) 10/03/2022 07:50 AM    Protein, total 6.2 (L) 10/03/2022 07:50 AM    Albumin 3.1 (L) 10/03/2022 07:50 AM    Globulin 3.1 10/03/2022 07:50 AM     Lab Results   Component Value Date/Time    INR 1.1 02/22/2019 08:18 PM    Prothrombin time 10.8 02/22/2019 08:18 PM    aPTT 27.8 02/22/2019 08:18 PM      Lab Results   Component Value Date/Time    Iron 18 (L) 05/06/2022 11:13 AM    TIBC 173 (L) 05/06/2022 11:13 AM    Iron % saturation 10 (L) 05/06/2022 11:13 AM    Ferritin 426 (H) 05/06/2022 11:13 AM          MRI lumbar spine 12/18/19:  Congenital narrowing of the lumbar canal. Vertebral body heights are maintained. Marrow signal is normal.     The conus medullaris terminates at T12/L1. Signal and caliber of the distal  spinal cord are within normal limits. There is no pathologic intrathecal  enhancement. The paraspinal soft tissues are within normal limits. Lower thoracic spine: No herniation or stenosis. L1-L2: No herniation or stenosis. L2-L3: No herniation or stenosis. L3-L4: Mild facet arthropathy. Minimal central disc protrusion. Mild canal  stenosis. Foramina are patent  L4-L5: Desiccation. Mild facet arthropathy. Canal demonstrates minimal stenosis. There is an annular ligament tear far laterally on the left. Mild left foraminal  stenosis. L5-S1: Mild facet arthropathy. Canal and foramina are patent. IMPRESSION:  Mild disc degenerative change at L3-4 and L4-5. Mild canal stenosis at L3-4 and mild left foraminal stenosis at L4-5. Other less severe degenerative findings are as described above. CT chest/abdomen 12/16/19:  THYROID: No nodule. MEDIASTINUM: No mass or lymphadenopathy. Port catheter in place on the right. Catheter tip in appropriate position. SB: No mass or lymphadenopathy. THORACIC AORTA: No dissection or aneurysm. MAIN PULMONARY ARTERY: Unremarkable  TRACHEA/BRONCHI: Unremarkable  ESOPHAGUS: No wall thickening or dilatation. HEART: Normal in size. PLEURA: No effusion or pneumothorax. LUNGS: Bibasilar atelectasis is noted. Anais Red LIVER: No mass or biliary dilatation. GALLBLADDER: Layering contrast versus cholelithiasis. SPLEEN: No mass. PANCREAS: No mass or ductal dilatation. ADRENALS: The left adrenal gland is elevated by the retroperitoneal mass. The    right is unremarkable.     KIDNEYS: There is diminished soft tissue density at the level of the left renal  hilum. There is increased left renal cortical atrophy. STOMACH: Unremarkable. SMALL BOWEL: No dilatation or wall thickening. COLON: No dilatation or wall thickening. APPENDIX: Unremarkable. PERITONEUM: No ascites or pneumoperitoneum. RETROPERITONEUM:   Diminished size of retroperitoneal mass. Diminished in size with margins difficult to fully ascertain. 2-62 35 x 50 mm previously 71 x 94 mm.     2-67 left periaortic adenopathy 25 x 17 mm  The SMA, celiac, and LIZZY are remain encased, diminished attenuation of these  vessels. REPRODUCTIVE ORGANS: The prostate and seminal vesicles are unremarkable. URINARY  BLADDER: Unremarkable  BONES: No destructive bone lesion. ADDITIONAL COMMENTS: Resolved left inguinal pseudoaneurysm. Michelle Méndez IMPRESSION:    Baseline research examination. Findings are consistent with interval response to therapy. Continued diminished size of retroperitoneal mass-adenopathy,  with diminished soft tissue density at the left renal hilum. CT chest/abdomen/pelvis 2/14/22:  1. Annular mass lesion of the right colon with upstream fecal retention  concerning for primary colon neoplasm versus less likely lymphoma. 2. Soft tissue stranding around celiac axis, SMA, and abdominal aorta may  reflect infiltrative lymphoma. 3. Left renal atrophy with left hydronephrosis likely reflecting chronic  proximal ureteral obstruction. 4. Incidentals as above. CT chest/abdomen/pelvis 7/19/22:  Response to treatment characterized by decrease in size of the apical core  lesion in the proximal transverse colon and decreased mucosal colic  lymphadenopathy. Persistent mesenteric lymphadenopathy and retroperitoneal/mesenteric soft tissue  which may relate to the patient's history of lymphoma. Chronic left renal atrophy with chronic left hydronephrosis. CT chest/abdomen/pelvis 10/10/22: Increase in peritoneal disease compared to the prior examination. Stable  mesenteric infiltrate. Improvement in the mass lesion involving the transverse  colon. CONTROLLED SUBSTANCES SAFETY ASSESSMENT (IF ON CONTROLLED SUBSTANCES):     Reviewed opioid safety handout:  [x] Yes   [] No  24 hour opioid dose >150mg morphine equivalent/day:  [] Yes   [x] No  Benzodiazepines:  [] Yes   [x] No  Sleep apnea:  [] Yes   [x] No  Urine Toxicology Testing within last 6 months:  [x] Yes   [] No 8/22/22 - results pending  History of or new aberrant medication taking behaviors:  [] Yes   [x] No  Has Narcan been prescribed [x] Yes   [] No          Total time:   Counseling / coordination time:   > 50% counseling / coordination?:     Brian Malik Sr. , was evaluated through a synchronous (real-time) audio-video encounter. The patient (or guardian if applicable) is aware that this is a billable service, which includes applicable co-pays. This Virtual Visit was conducted with patient's (and/or legal guardian's) consent. The visit was conducted pursuant to the emergency declaration under the [address] [place] [address], [address] [place] [address] waiver authority and the [employer] General Act. Patient identification was verified, and a caregiver was present when appropriate. The patient was located in a state where the provider was licensed to provide care.

## 2022-10-12 ENCOUNTER — APPOINTMENT (OUTPATIENT)
Dept: INFUSION THERAPY | Age: 47
End: 2022-10-12

## 2022-10-14 NOTE — TELEPHONE ENCOUNTER
Spoke with patient concerning medication increase. Patient will start Lisinopril 20 mg daily and see Stephy Wright 9/11/19. No lymphadedenopathy

## 2022-10-17 ENCOUNTER — HOSPITAL ENCOUNTER (OUTPATIENT)
Dept: INFUSION THERAPY | Age: 47
End: 2022-10-17

## 2022-10-17 DIAGNOSIS — R10.84 ABDOMINAL PAIN, GENERALIZED: ICD-10-CM

## 2022-10-17 DIAGNOSIS — C78.6 PERITONEAL CARCINOMATOSIS (HCC): ICD-10-CM

## 2022-10-17 DIAGNOSIS — C18.9 COLON ADENOCARCINOMA (HCC): ICD-10-CM

## 2022-10-17 RX ORDER — OXYCODONE HYDROCHLORIDE 10 MG/1
10 TABLET ORAL
Qty: 90 TABLET | Refills: 0 | Status: SHIPPED | OUTPATIENT
Start: 2022-10-21 | End: 2022-11-04 | Stop reason: SDUPTHER

## 2022-10-17 NOTE — TELEPHONE ENCOUNTER
Triage for Controlled Substance Refill Request     Pain Diagnosis: _Colon adenocarcinoma ([facility] [address] (C18.9); Abdominal pain, generalized (R10.84); Peritoneal carcinomatosis ([facility] [address] (C78.6)     Last Outpatient Visit: _10/11/2022     Next Outpatient Visit: _none     Reason for refill needed outside of office visit? Appointment not scheduled prior to need for scheduled refill        Pharmacy: _[facility]/pharmacy #[id number] [place] [address]      Medication:oxyCODONE IR (ROXICODONE) 10 mg tab immediate release tablet     Dose and directions: Take 1 Tablet by mouth every four (4) hours as needed for Pain for up to 15 days.   Number dispensed:90  Date filled ( or Pharmacy):10/6/2022  # left:15         reviewed: _yes     Date of Urine Drug Screen:  _8/22/2022     Opioid Safety Handout given:  _yes     Appropriate for refill:  _yes     Action:  _ Medication pending

## 2022-10-17 NOTE — TELEPHONE ENCOUNTER
Received call from patient requesting Oxycodone fast acting refill. Patient has 15 pills remaining. To be called in to [facility] pharmacy.

## 2022-10-18 RX ORDER — DEXTROSE MONOHYDRATE 50 MG/ML
25 INJECTION, SOLUTION INTRAVENOUS CONTINUOUS
Status: CANCELLED | OUTPATIENT
Start: 2022-10-24

## 2022-10-18 RX ORDER — DIPHENHYDRAMINE HYDROCHLORIDE 50 MG/ML
50 INJECTION, SOLUTION INTRAMUSCULAR; INTRAVENOUS AS NEEDED
Status: CANCELLED
Start: 2022-10-24

## 2022-10-18 RX ORDER — ATROPINE SULFATE 0.4 MG/ML
0.4 INJECTION, SOLUTION ENDOTRACHEAL; INTRAMEDULLARY; INTRAMUSCULAR; INTRAVENOUS; SUBCUTANEOUS
Status: CANCELLED | OUTPATIENT
Start: 2022-10-24

## 2022-10-18 RX ORDER — ACETAMINOPHEN 325 MG/1
650 TABLET ORAL AS NEEDED
Status: CANCELLED
Start: 2022-10-24

## 2022-10-18 RX ORDER — ONDANSETRON 2 MG/ML
8 INJECTION INTRAMUSCULAR; INTRAVENOUS AS NEEDED
Status: CANCELLED | OUTPATIENT
Start: 2022-10-24

## 2022-10-18 RX ORDER — SODIUM CHLORIDE 9 MG/ML
10 INJECTION INTRAMUSCULAR; INTRAVENOUS; SUBCUTANEOUS AS NEEDED
Status: CANCELLED | OUTPATIENT
Start: 2022-10-24

## 2022-10-18 RX ORDER — ALBUTEROL SULFATE 0.83 MG/ML
2.5 SOLUTION RESPIRATORY (INHALATION) AS NEEDED
Status: CANCELLED
Start: 2022-10-24

## 2022-10-18 RX ORDER — HEPARIN 100 UNIT/ML
300-500 SYRINGE INTRAVENOUS AS NEEDED
Status: CANCELLED | OUTPATIENT
Start: 2022-10-24

## 2022-10-18 RX ORDER — EPINEPHRINE 1 MG/ML
0.3 INJECTION, SOLUTION, CONCENTRATE INTRAVENOUS AS NEEDED
Status: CANCELLED | OUTPATIENT
Start: 2022-10-24

## 2022-10-18 RX ORDER — SODIUM CHLORIDE 0.9 % (FLUSH) 0.9 %
10 SYRINGE (ML) INJECTION AS NEEDED
Status: CANCELLED | OUTPATIENT
Start: 2022-10-24

## 2022-10-18 RX ORDER — DIPHENHYDRAMINE HYDROCHLORIDE 50 MG/ML
25 INJECTION, SOLUTION INTRAMUSCULAR; INTRAVENOUS AS NEEDED
Status: CANCELLED
Start: 2022-10-24

## 2022-10-18 RX ORDER — SODIUM CHLORIDE 9 MG/ML
25 INJECTION, SOLUTION INTRAVENOUS CONTINUOUS
Status: CANCELLED | OUTPATIENT
Start: 2022-10-24

## 2022-10-18 RX ORDER — HYDROCORTISONE SODIUM SUCCINATE 100 MG/2ML
100 INJECTION, POWDER, FOR SOLUTION INTRAMUSCULAR; INTRAVENOUS AS NEEDED
Status: CANCELLED | OUTPATIENT
Start: 2022-10-24

## 2022-10-18 RX ORDER — PALONOSETRON 0.05 MG/ML
0.25 INJECTION, SOLUTION INTRAVENOUS ONCE
Status: CANCELLED | OUTPATIENT
Start: 2022-10-24 | End: 2022-10-24

## 2022-10-19 ENCOUNTER — HOSPITAL ENCOUNTER (OUTPATIENT)
Dept: INFUSION THERAPY | Age: 47
End: 2022-10-19
Payer: MEDICAID

## 2022-10-20 NOTE — PROGRESS NOTES
[id number] [facility] at [facility]  [phone]    Hematology / Oncology Established Visit    Reason for Visit:   Shannon Valiente is a 41 y.o. male who is seen for urgent follow up of colon cancer, h/o lymphoma. Hematology Oncology Treatment History:     Diagnosis #1: Follicular lymphoma  Stage: IV  Pathology:   11/13/18 right inguinal LN excision: Follicular lymphoma, high-grade (grade 3a of 3). Prior Treatment:   1. Obinutuzumab-CHOP. Obinutuzumab: 1000 mg weekly on days 1, 8, 15 for cycle 1, then 1000 mg on day 1 q21 days for cycles 2-6, then monotherapy 1000 mg every 21 days for cycle 7, 8 with Cyclophosphamide 750mg/m2, Doxorubicin 50mg/m2, Vincristine 1.4mg/m2 on day 1 and Prednisone 100mg on Days 1-5, every 21 days for a total of 2 cycles completed late 1/2019. Regimen discontinued due to STEMI. 2. Obinutuzumab + Bendamustine: 1000 mg Obinutuzumab on day 1 + Bendamustine 90mg/m2 on days 1-2 on a 28-day cycle x 4 cycles, completed 5/2019  Current Treatment: Surveillance      Diagnosis #2: Colon cancer:  Stage: IV  Pathology:  Ascending colon; biopsy: poorly differentiated carcinoma  Comment: No dysplasia is identified. Immunohistochemical stains performed on block 1A, show the tumor positive for Cam5.2 and shows patchy positivity for CDX2 with a Ki-67 index of -90%; the tumor is focally positive for CK5/6 and GATA3; negative for p63, Pax8, CK7, CK20, panmelanoma, synaptophysin and chromogranin. The immunophenotypic features are nonspecific but argues somewhat against the following carcinoma: urothelial, neuroendocrine and breast. The immunophenotypic features also argues against melanoma and somewhat against classic colorectal carcinoma. Additional immunohistochemical stains to exclude the possibility of prostate and hepatocellular carcinoma have been   ordered; the results will be reported as an addendum.   -MLH1/MSH2/MSH6/PMS2 all intact with no loss of nuclear expression of MMR proteins - no MSI   Addendum: The addendum is issued to report the results of additional immunohistochemical stains in an attempt to ascertain the primary site of the carcinoma. The diagnosis is unchanged. Hepar and glypican 3 immunohistochemical stains are neg, arguing against hepatocellular carcinoma. PSA stain is negative, arguing against prostatic primary. Imaging studies to eval for poss primary sites maybe useful. In the absence of carcinoma/tumor at any other sites, this may represent an undifferentiated carcinoma of the colon.  -Oncogenomics (molecular) studies: POLE< ARID1A, YVONNE, ATR, BRCA2, CHECK1, FANCI, NF1, PIK3CA, PTCH1, PTEN, RAD50, RAD51, RB1, SMARCA4, STK11    Prior Treatment:   1. Loop ileostomy 2/19/22  2. Diagnotic laparoscopy with peritoneal biopsy, 4/27/22    Current Treatment: FOLFOXIRI every 2 weeks until disease progression or toxicity, 5/16/22 - current      Oncologic History:  Was in his usual state of health in early November 2018 when he developed low back pain and presented to the ER. Work-up at outside hospital revealed a retroperitoneal mass seen on CT imaging, and he was transferred to [facility] for further work-up. CT there showed a large retroperitoneal mass encircling the aorta with invasion of the left renal hilum and left adrenal gland. There were bilateral inguinal lymph nodes and moderate left hydronephrosis. He was evaluated while at [facility] and was noted to have palpable nodes in his groin for approximately the past 1 month. He underwent excisional LN biopsy of right inguinal LN, which revealed follicular lymphoma. At time of diagnosis, he had no cardiac disease aside from HTN, hyperlipidemia. However, he did have an NSTEMI after cycle 2 of O-CHOP. Was likely unrelated to chemotherapy, but opted to switch treatment to Obinutuzumab-Bendamustine. He completed treatment in 5/2019 and had a CR based on PET.     History of Present Illness:   Mr. Jerel Elizondo is a 41 y.o. male with is seen for follow up of colon cancer and C10 of treatment. He completed CT imaging. Patient reports insomnia. He states that Melatonin does not help. Reports neuropathy in feet. Denies dropping items. Denies fever, chills,chest pain, SOB. Reports adequate appetite and hydration. PMHx: Lymphoma in 2018, CAD, HTN, Hyperlipidemia, Anxiety, chronic low back pain  PSurgHx: None aside from cardiac stent placement and port placement  SHx: Smokes 1/2ppd x past 20 yrs. Works part time as a cook. Has 2 children. FHx: Father had leukemia, passed away from pancreatic cancer. Review of Systems: A complete review of systems was obtained, negative except as described above. Physical Exam:     Visit Vitals  /64 (BP 1 Location: Left upper arm, BP Patient Position: Sitting, BP Cuff Size: Small adult)   Pulse 80   Wt 146 lb 3.2 oz (66.3 kg)   SpO2 97%   BMI 22.90 kg/m²         ECOG PS: 0  General: no distress  Eyes: anicteric sclerae  HENT: oropharynx clear  Neck: supple  Lymphatic: no cervical, supraclavicular adenopathy  Respiratory: CTAB, normal respiratory effort  CV: no peripheral edema, port upper chest   GI: soft, nontender, nondistended, no masses;  colostomy in place. Skin: no rashes; no ecchymoses; no petechiae      Results:     Lab Results   Component Value Date/Time    WBC 3.7 (L) 10/24/2022 08:03 AM    HGB 12.1 10/24/2022 08:03 AM    HCT 35.6 (L) 10/24/2022 08:03 AM    PLATELET 580 (L) 96/29/0379 08:03 AM    .1 (H) 10/24/2022 08:03 AM    ABS.  NEUTROPHILS 1.7 (L) 10/24/2022 08:03 AM    Hemoglobin (POC) 15.0 06/05/2009 02:13 PM    Hematocrit (POC) 39 02/14/2019 01:24 PM     Lab Results   Component Value Date/Time    Sodium 143 10/24/2022 08:03 AM    Potassium 3.7 10/24/2022 08:03 AM    Chloride 111 (H) 10/24/2022 08:03 AM    CO2 25 10/24/2022 08:03 AM    Glucose 100 10/24/2022 08:03 AM    BUN 11 10/24/2022 08:03 AM    Creatinine 0.88 10/24/2022 08:03 AM    GFR est AA >60 10/03/2022 07:50 AM    GFR est non-AA >60 10/03/2022 07:50 AM    Calcium 9.3 10/24/2022 08:03 AM    Sodium (POC) 136 2019 01:24 PM    Potassium (POC) 3.9 2019 01:24 PM    Chloride (POC) 102 2019 01:24 PM    Glucose (POC) 249 (H) 02/15/2019 10:21 PM    BUN (POC) 14 2019 01:24 PM    Creatinine (POC) 0.9 2019 01:24 PM    Calcium, ionized (POC) 1.24 2019 01:24 PM     Lab Results   Component Value Date/Time    Bilirubin, total 0.4 10/24/2022 08:03 AM    ALT (SGPT) 46 10/24/2022 08:03 AM    Alk. phosphatase 331 (H) 10/24/2022 08:03 AM    Protein, total 6.2 (L) 10/24/2022 08:03 AM    Albumin 3.2 (L) 10/24/2022 08:03 AM    Globulin 3.0 10/24/2022 08:03 AM     Lab Results   Component Value Date/Time    Iron 18 (L) 2022 11:13 AM    TIBC 173 (L) 2022 11:13 AM    Iron % saturation 10 (L) 2022 11:13 AM    Ferritin 426 (H) 2022 11:13 AM       No results found for: B12LT, FOL, RBCF  Lab Results   Component Value Date/Time    TSH 1.53 2016 04:40 AM       18:       Labs at [facility]:  22: CA 19-9 = 390  22: CEA = 12.3  22: CEA = 19.2  22: CEA = 17.9   22: CEA = 6.0    Signatera MRD:  22: 295.97 MTM/mL  22: 2.98  22: 0.88     Imagin/9/18 Abd/pelvis CT: IMPRESSION:  1. Interval development of a large retroperitoneal mass encircling the aorta with invasion of the left renal hilum and left adrenal gland. Several adjacent lymph nodes are seen extending into the peritoneum and underneath the  diaphragmatic natalie. This most likely represents lymphoma. 2. Several new bilateral enlarged inguinal lymph nodes also likely representing lymphoma. 3. Moderate left hydronephrosis with a delayed renal nephrogram related to decreased renal function. This is related to the invasion of the renal hilum. 18 Chest CT: IMPRESSION:  Trace left pleural effusion. Bilateral lower lobe atelectasis.  Large  retroperitoneal mass lesion again demonstrated. PET 12/18/18:  FINDINGS:  HEAD/NECK: Right palatine tonsil intense hypermetabolism, max SUV 18. Multilevel  bilateral cervical adenopathy, with max SUV 12 in a left supraclavicular node. Cerebral evaluation is limited by normal intense activity. CHEST: Solitary hypermetabolic left axillary node, max SUV 11. ABDOMEN/PELVIS: Bulky retroperitoneal mass max SUV 27, with several additional  small active abdomino-pelvic nodes. Bilateral inguinal nodes with max SUV 12 on  the left. SKELETON: No foci of abnormal hypermetabolism in the axial and visualized  appendicular skeleton. IMPRESSION:   1. Right palatine tonsil tumor involvement (Deauville 5). 2. Bilateral cervical delaney involvement (Deauville 5). 3. Left axillary node involvement (Deauville 5). 4. Bulky retroperitoneal lymphoma mass and additional smaller hypermetabolic  abdomino-pelvic nodes (Deauville 5). 5. Bilateral inguinal delaney involvement (Deauville 5). Deauville Five Point Scale  1. No uptake or no residual uptake (when used interim)  2. Slight uptake, but below blood pool (mediastinum)  3. Uptake above mediastinal, but below/equal to uptake in the liver  4. Uptake slightly to moderately higher than liver  5. Markedly increased uptake or any new lesion (on response evaluation)  Each FDG-avid (or previously FDG avid) lesion is rated independently. Reference values:  Mediastinal blood pool: 2.1 SUV  Liver (background): 2.2 SUV    PET/CT 2/05/19:   IMPRESSION:  1. No Foci of Abnormal Hypermetabolism (Deauville 1). 2. Resolved activity in the right palatine tonsil, bilateral cervical nodes,left axillary node, retroperitoneal/abdominal pelvic adenopathy, bilateral inguinal nodes. Echo 2/14/19:  Normal cavity size, wall thickness and systolic function (ejection fraction normal). The muscle mass is normal. The cavity shape is normal. The estimated ejection fraction is 41 - 45%.  Abnormal wall motion as described on the wall scoring diagram below. End-systolic volume is normal. Normal left ventricular strain. There is mild (grade 1) left ventricular diastolic dysfunction. Normal left ventricular diastolic pressure. End-diastolic volume is normal.    LE arterial duplex 2/22/19:  There is evidence of left groin pseudoaneurysm noted arising from distal common femoral artery, pseudo lobe measures 2.32cm x 2.58cm and pseudo neck length measuring 0.63cm. There is no evidence of hemodynamically significant left lower extremity arterial obstruction. JACLYN is 1.03 on the right and 1.02 on the left. LE arterial duplex s/p Thrombin Injection to Pseudoaneurysm 2/26/19:  Successful thrombin injection procedure of the left groin with no further flow seen. No evidence of hemodnyamically significant obstruction in the left lower extremity. Left lower extremity arterial duplex performed. Confirmed pseudoaneurysm in left groin with small neck. Following thrombin injection, no further flow seen in the pseudoaneurysm. The left common femoral, profunda femoral, femoral, popliteal, posterior tibial and anterior arteries were imaged. Mainly triphasic flow was seen with no evidence of significantly elevated velocities. Repeat LE arterial duplex 2/27/19:  Continued thrombosed left groin pseudoaneurysm following thrombin injection on 02/26/2019. No flow or color fill is identified. The hematoma measures approximately 2.1 x 2.9 cm in diameter. The common femoral, deep femoral, femoral, and popliteal arteries are patent with mainly tri-phasic flow and no significant hemodynamically obstruction is noted. Stress 5/31/19:  Normal stress myocardial perfusion without ischemia or infact at 84% MPHR. Normal LV function. LVEF 60%. No EKG changes of ischemia at peak exercise. Normal functional capacity. PET 6/03/19: IMPRESSION: No Foci of Abnormal Hypermetabolism (Deauville 1).     -MRI lumbar spine 12/18/19:  Mild disc degenerative change at L3-4 and L4-5. Mild canal stenosis at L3-4 and mild left foraminal stenosis at L4-5. Other less severe degenerative findings are as described above. Continued diminished size of retroperitoneal mass-adenopathy,  with diminished soft tissue density at the left renal hilum. -CT chest/abdomen 12/16/19:  Findings are consistent with interval response to therapy    CT c/a/p 5/28/20: IMPRESSION:  Further contraction of the retroperitoneal mantle and chris mesentery,  compatible with treatment response  Stable left hilar soft tissue mass  Slight increased splaying of the duodenum and proximal jejunum is the result, without obstruction  No evidence for recurrence of lymphoma in the chest, abdomen, or pelvis    CT c/a/p 2/13/22:  FINDINGS:   LOWER THORAX: Dependent atelectasis with otherwise clear lungs. The visualized  heart is normal in size without pericardial effusion. LIVER: No mass. BILIARY TREE: Gallbladder is unremarkable. CBD is not dilated. SPLEEN: Unremarkable. PANCREAS: No mass or ductal dilatation. ADRENALS: Unremarkable. KIDNEYS: Atrophic left kidney with mild left hydronephrosis. Right kidney is  unremarkable with no stone, enhancing mass, or other renal abnormality. STOMACH: Unremarkable. SMALL BOWEL: No dilatation or wall thickening. COLON: Circumferential wall thickening at the hepatic flexure with luminal  narrowing, adjacent mesenteric stranding, and fluid with upstream retention in  the cecum and fecalization of distal ileal contents. No dilation or other wall  thickening. APPENDIX: Unremarkable. PERITONEUM: Moderate free fluid with no pneumoperitoneum. RETROPERITONEUM: Soft tissue stranding around the celiac and SMA and associated aorta with no discrete mass or adenopathy. Aorta is normal in size without aneurysm or dissection. REPRODUCTIVE ORGANS: Prostate and seminal vesicles appear unremarkable. URINARY BLADDER: No mass or calculus.   BONES: No destructive bone lesion. ABDOMINAL WALL: No mass or hernia. ADDITIONAL COMMENTS: N/A  IMPRESSION  1. Annular mass lesion of the right colon with upstream fecal retention  concerning for primary colon neoplasm versus less likely lymphoma. 2. Soft tissue stranding around celiac axis, SMA, and abdominal aorta may  reflect infiltrative lymphoma. 3. Left renal atrophy with left hydronephrosis likely reflecting chronic  proximal ureteral obstruction. 4. Incidentals as above. 2/24/22 CT abd/pelvis at [facility]:  FINDINGS: An enteric tube with sidehole beyond the level of the lower esophageal sphincter is seen looping on itself in the proximal stomach before terminating in the mid stomach. Status post right lower quadrant diverting ileostomy for an obstructing colonic mass. Multiple loops of gas dilated small bowel remain, with a maximal diameter of 5.4 cm whereas previously measured 3.6 cm. Dilute contrast is seen within the lateral left abdominal dilated small bowel. Moderate stool burden and bowel gas opacifies the cecum, measuring 7.3 cm in diameter. No definite supine evidence of pneumoperitoneum. Lung bases: Limited evaluation of the lung bases demonstrates a cardiac silhouette within normal limits for size. A small bore central venous catheter is seen terminating in the right atrium. No focal consolidation or pleural effusion. 2/24/22 CT abd/pelvis [facility]:  IMPRESSION:  1. Status post right lower quadrant loop ileostomy. Mild diffuse dilation of small bowel proximal to the ostomy in the lower abdomen and upper pelvis concerning for at least mild to moderate partial bowel obstruction. Relatively decompressed loop ileostomy. 2. Mildly complex pelvic fluid collection in the rectovesical space, decreased in size from prior. 3. Redemonstrated ascending colon mass and findings suspicious for regional metastatic disease.   4. Redemonstrated soft tissue in the retroperitoneum with prominent lymph nodes, similar to prior examination. Differential would include metastasis, retroperitoneal fibrosis. 5. Mild atherosclerosis. 6. Subcentimeter right renal hypodensity, too small to characterize. 7. Severe compression of the left renal vein, similar to prior examination. 8. Additional findings as described above. Bones: No acute osseous abnormality. 2/24/22 CT chest [facility]:  IMPRESSION:  FINAL report. 1. No evidence of metastatic disease to the chest.  2. No enlarged lymph nodes. 3. Increasing volume loss in the lung bases which may be due to atelectasis. 4. CT abdomen and pelvis reported separately. 2/25/22 Colonoscopy at [facility]:  Impression:            - Preparation of the colon was inadequate. - Stool in the entire examined colon. - Likely malignant completely obstructing tumor at the                         hepatic flexure. Biopsied.                        - Malignant-appearing tumor in the colon. Complications:         No immediate complications. 7/19/22 CT ch/abd/pelv:  IMPRESSION  Response to treatment characterized by decrease in size of the apical core  lesion in the proximal transverse colon and decreased mucosal colic  lymphadenopathy. Persistent mesenteric lymphadenopathy and retroperitoneal/mesenteric soft tissue  which may relate to the patient's history of lymphoma. Chronic left renal atrophy with chronic left hydronephrosis. RECIST:  Target lesions:  Transverse colon mass, series 2, image 75, 27 x 26 mm, previously 49 x 45 mm. Nontarget lesions:  Transverse mesocolic lymph nodes, decreased. 10/10/22 CT ch/abd/pelv:  IMPRESSION  Increase in peritoneal disease compared to the prior examination. Stable  mesenteric infiltrate. Improvement in the mass lesion involving the transverse  colon. RECIST:  Target lesion:  Transverse colon mass, series 2, image 76, 1.7 x 1.3 cm, previously 2.7 x 2.6cm.   Nontarget lesions:   Transverse mesial colic lymph nodes unchanged. Assessment & Plan:   Jennifer Hudson is a 41 y.o. male comes in for evaluation and management of lymphoma. 1. Undifferentiated carcinoma of transverse colon, metastatic, KRAS/NRAS status unclear:  S/p diverting ileostomy due to large bowel obstruction. Colonoscopy with biopsy on 2/25/22. No obvious metastatic disease on CT imaging, but did have obvious metastatic disease to peritoneum during laparoscopy with Dr. Frankie Doherty. I recommended FOLFOXIRI every 2 weeks. Will not give Bevacizumab given h/o MI and tobacco use. [employer] suggests: BRCA2 and YVONNE mutations support use of Olaparib or similar PARP inhibitor in future. They also suggest testing for TMB, MSI, PDL1 to determine utility of checkpoint inhibitors. CT after C9 showed good response with decrease in size of colon mass on 10/10/22. Supportive medications: zofran, compazine, dexamethasone, EMLA topical  -- Proceed with C10 FOLFOXIRI. Dose-reducing Irinotecan by 10% to avoid cytopenias/treatment delays. -- Repeat Signater[employer] testing showed continued decline. Repeat due today 10/24/2022. Looking into adding [employer] or alternate NGS testing to determine TMB, MSI, PDL1, KRAS/NRAS/BRAF. -- Check CEA every 8 weeks  -- Consider repeat colonoscopy in 4-6 months given incomplete colonoscopy. -- CT after every 4 cycles, next due after C13  -- Return in 2 weeks C11 of FOLFOXIRI     2. H/o Follicular lymphoma:   Grade 3a with bulky disease encircling the aorta, causing pain. Bone marrow negative. BR better than RCHOP, but based on GALLIUM study, Obinutuzumab-based induction and maintenance prolongs PFS over that seen with rituximab-based therapy. Therefore, pt started on O-CHOP regimen. Pt achieved CR after C2, but was switched to BR x 4 cycles given STEMI. Completed treatment 5/2019. End of treatment PET 6/3/19 showed CR. No extra surveillance needed at this time given metastatic colon cancer with frequent imaging.    Current imaging shows slight increase in soft tissue density in deep pelvis on 10/2022. Will continue to monitor for colon cancer follow up imaging. 3. Chronic lumbar back pain / anxiety / RLQ:   Left lower back pain is chronic/stable and RLQ is likely related to neoplasm/colostomy - currently managing pain on Oxycontin 10mg prn, and Lexapro daily. Following with Dr. Dulce Borja. 4. CAD / HTN:   h/o STEMI 2/14/29 with TOM placed to proximal LAD. Following with Dr. Eder Marcelo and remains on dual antiplatelet therapy, high dose Lipitor, Metoprolol, ACEI. Received only 2 doses of cardiotoxic chemo, Doxorubicin in early 1/2019. Is overdue for follow up with Dr. Eder Marcelo. 5. Tobacco abuse:   Previously discussed smoking cessation strategies and benefits. Pt has tried quitting in the past and is not interested. 6. BRCA2 mutation:  Pt would benefit from genetic counseling for himself and his family. 7. Chemotherapy induced neutropenia:  Intermittent. Neulasta added with C3-4, but now on hold due to insurance denial.   -- Consider adding Ziextendo if persistent neutropenia. 8. Chemotherapy induced thrombocytopenia:   Monitor for bleeding. Goals of care: Disease is not curable, but is treatable to improve quality and duration of life. I personally saw and evaluated the patient and performed the key components of medical decision making. The history, physical exam, and documentation were performed by Marli Londono NP. I reviewed and verified the above documentation and modified it as needed. Proceed with C9 FOLFOXIRI which pt is tolerating well. Recent imaging shows response to treatment.        Signed By: Chioma Newell MD

## 2022-10-24 ENCOUNTER — OFFICE VISIT (OUTPATIENT)
Dept: ONCOLOGY | Age: 47
End: 2022-10-24
Payer: MEDICAID

## 2022-10-24 ENCOUNTER — APPOINTMENT (OUTPATIENT)
Dept: INFUSION THERAPY | Age: 47
End: 2022-10-24
Payer: MEDICAID

## 2022-10-24 ENCOUNTER — HOSPITAL ENCOUNTER (OUTPATIENT)
Dept: INFUSION THERAPY | Age: 47
Discharge: HOME | End: 2022-10-24
Payer: MEDICAID

## 2022-10-24 ENCOUNTER — TELEPHONE (OUTPATIENT)
Dept: ONCOLOGY | Age: 47
End: 2022-10-24

## 2022-10-24 VITALS
OXYGEN SATURATION: 98 % | SYSTOLIC BLOOD PRESSURE: 115 MMHG | RESPIRATION RATE: 16 BRPM | HEIGHT: 67 IN | WEIGHT: 144.6 LBS | TEMPERATURE: 97.4 F | HEART RATE: 71 BPM | DIASTOLIC BLOOD PRESSURE: 69 MMHG | BODY MASS INDEX: 22.7 KG/M2

## 2022-10-24 VITALS
DIASTOLIC BLOOD PRESSURE: 64 MMHG | SYSTOLIC BLOOD PRESSURE: 104 MMHG | OXYGEN SATURATION: 97 % | HEART RATE: 80 BPM | WEIGHT: 146.2 LBS | BODY MASS INDEX: 22.9 KG/M2

## 2022-10-24 DIAGNOSIS — C18.4 CARCINOMA OF TRANSVERSE COLON (HCC): Primary | ICD-10-CM

## 2022-10-24 DIAGNOSIS — Z76.89 PREVENTION OF CHEMOTHERAPY-INDUCED NEUTROPENIA: ICD-10-CM

## 2022-10-24 DIAGNOSIS — R10.31 RLQ ABDOMINAL PAIN: ICD-10-CM

## 2022-10-24 DIAGNOSIS — T45.1X5A CHEMOTHERAPY-INDUCED THROMBOCYTOPENIA: ICD-10-CM

## 2022-10-24 DIAGNOSIS — Z51.11 CHEMOTHERAPY MANAGEMENT, ENCOUNTER FOR: ICD-10-CM

## 2022-10-24 DIAGNOSIS — C82.90 FOLLICULAR LYMPHOMA, UNSPECIFIED FOLLICULAR LYMPHOMA TYPE, UNSPECIFIED BODY REGION (HCC): ICD-10-CM

## 2022-10-24 DIAGNOSIS — I25.10 CORONARY ARTERY DISEASE INVOLVING NATIVE CORONARY ARTERY OF NATIVE HEART, UNSPECIFIED WHETHER ANGINA PRESENT: ICD-10-CM

## 2022-10-24 DIAGNOSIS — D69.59 CHEMOTHERAPY-INDUCED THROMBOCYTOPENIA: ICD-10-CM

## 2022-10-24 DIAGNOSIS — D70.1 CHEMOTHERAPY INDUCED NEUTROPENIA (HCC): Primary | ICD-10-CM

## 2022-10-24 DIAGNOSIS — C18.9 COLON ADENOCARCINOMA (HCC): ICD-10-CM

## 2022-10-24 DIAGNOSIS — T45.1X5A CHEMOTHERAPY INDUCED NEUTROPENIA (HCC): Primary | ICD-10-CM

## 2022-10-24 LAB
ALBUMIN SERPL-MCNC: 3.2 G/DL (ref 3.5–5)
ALBUMIN/GLOB SERPL: 1.1 {RATIO} (ref 1.1–2.2)
ALP SERPL-CCNC: 331 U/L (ref 45–117)
ALT SERPL-CCNC: 46 U/L (ref 12–78)
ANION GAP SERPL CALC-SCNC: 7 MMOL/L (ref 5–15)
AST SERPL-CCNC: 33 U/L (ref 15–37)
BASO+EOS+MONOS # BLD AUTO: 0.9 K/UL (ref 0.2–1.2)
BASO+EOS+MONOS NFR BLD AUTO: 23 % (ref 3.2–16.9)
BILIRUB SERPL-MCNC: 0.4 MG/DL (ref 0.2–1)
BUN SERPL-MCNC: 11 MG/DL (ref 6–20)
BUN/CREAT SERPL: 13 (ref 12–20)
CALCIUM SERPL-MCNC: 9.3 MG/DL (ref 8.5–10.1)
CHLORIDE SERPL-SCNC: 111 MMOL/L (ref 97–108)
CO2 SERPL-SCNC: 25 MMOL/L (ref 21–32)
CREAT SERPL-MCNC: 0.88 MG/DL (ref 0.7–1.3)
DIFFERENTIAL METHOD BLD: ABNORMAL
ERYTHROCYTE [DISTWIDTH] IN BLOOD BY AUTOMATED COUNT: 15.7 % (ref 11.8–15.8)
GLOBULIN SER CALC-MCNC: 3 G/DL (ref 2–4)
GLUCOSE SERPL-MCNC: 100 MG/DL (ref 65–100)
HCT VFR BLD AUTO: 35.6 % (ref 36.6–50.3)
HGB BLD-MCNC: 12.1 G/DL (ref 12.1–17)
LYMPHOCYTES # BLD: 1.1 K/UL (ref 0.8–3.5)
LYMPHOCYTES NFR BLD: 31 % (ref 12–49)
MCH RBC QN AUTO: 34.4 PG (ref 26–34)
MCHC RBC AUTO-ENTMCNC: 34 G/DL (ref 30–36.5)
MCV RBC AUTO: 101.1 FL (ref 80–99)
NEUTS SEG # BLD: 1.7 K/UL (ref 1.8–8)
NEUTS SEG NFR BLD: 46 % (ref 32–75)
PLATELET # BLD AUTO: 123 K/UL (ref 150–400)
POTASSIUM SERPL-SCNC: 3.7 MMOL/L (ref 3.5–5.1)
PROT SERPL-MCNC: 6.2 G/DL (ref 6.4–8.2)
RBC # BLD AUTO: 3.52 M/UL (ref 4.1–5.7)
SODIUM SERPL-SCNC: 143 MMOL/L (ref 136–145)
WBC # BLD AUTO: 3.7 K/UL (ref 4.1–11.1)

## 2022-10-24 PROCEDURE — 96366 THER/PROPH/DIAG IV INF ADDON: CPT

## 2022-10-24 PROCEDURE — 96417 CHEMO IV INFUS EACH ADDL SEQ: CPT

## 2022-10-24 PROCEDURE — 36415 COLL VENOUS BLD VENIPUNCTURE: CPT

## 2022-10-24 PROCEDURE — 77030012965 HC NDL HUBR BBMI -A

## 2022-10-24 PROCEDURE — 74011000258 HC RX REV CODE- 258: Performed by: NURSE PRACTITIONER

## 2022-10-24 PROCEDURE — 96375 TX/PRO/DX INJ NEW DRUG ADDON: CPT

## 2022-10-24 PROCEDURE — 96416 CHEMO PROLONG INFUSE W/PUMP: CPT

## 2022-10-24 PROCEDURE — 96413 CHEMO IV INFUSION 1 HR: CPT

## 2022-10-24 PROCEDURE — 74011250636 HC RX REV CODE- 250/636: Performed by: NURSE PRACTITIONER

## 2022-10-24 PROCEDURE — 80053 COMPREHEN METABOLIC PANEL: CPT

## 2022-10-24 PROCEDURE — 85025 COMPLETE CBC W/AUTO DIFF WBC: CPT

## 2022-10-24 PROCEDURE — 96368 THER/DIAG CONCURRENT INF: CPT

## 2022-10-24 PROCEDURE — 96415 CHEMO IV INFUSION ADDL HR: CPT

## 2022-10-24 PROCEDURE — 74011000250 HC RX REV CODE- 250: Performed by: NURSE PRACTITIONER

## 2022-10-24 PROCEDURE — 99215 OFFICE O/P EST HI 40 MIN: CPT | Performed by: INTERNAL MEDICINE

## 2022-10-24 RX ORDER — HEPARIN 100 UNIT/ML
300-500 SYRINGE INTRAVENOUS AS NEEDED
Status: ACTIVE | OUTPATIENT
Start: 2022-10-24 | End: 2022-10-24

## 2022-10-24 RX ORDER — SODIUM CHLORIDE 0.9 % (FLUSH) 0.9 %
10 SYRINGE (ML) INJECTION AS NEEDED
Status: DISPENSED | OUTPATIENT
Start: 2022-10-24 | End: 2022-10-24

## 2022-10-24 RX ORDER — PALONOSETRON 0.05 MG/ML
0.25 INJECTION, SOLUTION INTRAVENOUS ONCE
Status: COMPLETED | OUTPATIENT
Start: 2022-10-24 | End: 2022-10-24

## 2022-10-24 RX ORDER — SODIUM CHLORIDE 9 MG/ML
25 INJECTION, SOLUTION INTRAVENOUS CONTINUOUS
Status: DISCONTINUED | OUTPATIENT
Start: 2022-10-24 | End: 2022-10-26 | Stop reason: HOSPADM

## 2022-10-24 RX ORDER — ATROPINE SULFATE 0.4 MG/ML
0.4 INJECTION, SOLUTION ENDOTRACHEAL; INTRAMEDULLARY; INTRAMUSCULAR; INTRAVENOUS; SUBCUTANEOUS
Status: ACTIVE | OUTPATIENT
Start: 2022-10-24 | End: 2022-10-24

## 2022-10-24 RX ORDER — SODIUM CHLORIDE 9 MG/ML
10 INJECTION INTRAMUSCULAR; INTRAVENOUS; SUBCUTANEOUS AS NEEDED
Status: ACTIVE | OUTPATIENT
Start: 2022-10-24 | End: 2022-10-24

## 2022-10-24 RX ORDER — DEXTROSE MONOHYDRATE 50 MG/ML
25 INJECTION, SOLUTION INTRAVENOUS CONTINUOUS
Status: DISPENSED | OUTPATIENT
Start: 2022-10-24 | End: 2022-10-24

## 2022-10-24 RX ADMIN — FLUOROURACIL 4080 MG: 50 INJECTION, SOLUTION INTRAVENOUS at 14:37

## 2022-10-24 RX ADMIN — SODIUM CHLORIDE, PRESERVATIVE FREE 10 ML: 5 INJECTION INTRAVENOUS at 14:36

## 2022-10-24 RX ADMIN — DEXAMETHASONE SODIUM PHOSPHATE 12 MG: 4 INJECTION, SOLUTION INTRA-ARTICULAR; INTRALESIONAL; INTRAMUSCULAR; INTRAVENOUS; SOFT TISSUE at 09:47

## 2022-10-24 RX ADMIN — LEUCOVORIN CALCIUM 680 MG: 350 INJECTION, POWDER, LYOPHILIZED, FOR SUSPENSION INTRAMUSCULAR; INTRAVENOUS at 12:32

## 2022-10-24 RX ADMIN — OXALIPLATIN 144.5 MG: 5 INJECTION, SOLUTION INTRAVENOUS at 12:32

## 2022-10-24 RX ADMIN — DEXTROSE MONOHYDRATE 25 ML/HR: 50 INJECTION, SOLUTION INTRAVENOUS at 09:35

## 2022-10-24 RX ADMIN — PALONOSETRON 0.25 MG: 0.05 INJECTION, SOLUTION INTRAVENOUS at 09:43

## 2022-10-24 RX ADMIN — IRINOTECAN HYDROCHLORIDE 252 MG: 20 INJECTION, SOLUTION INTRAVENOUS at 10:50

## 2022-10-24 NOTE — PROGRESS NOTES
[facility] VISIT NOTE  Date: 2022    Name: Lucinda Luna. MRN: [id number]         : 1975    Mr. Yvonne Palmer Arrived ambulatory and in no distress for C10D1 of Folfoxiri Regimen. Assessment was completed by Taiwo Bruner RN, no acute issues at this time, no new complaints voiced. Chest wall port accessed without difficulty by the assessment nurse, labs drawn & sent for processing. Chemotherapy Flowsheet 10/24/2022   Cycle C10D1   Date 10/24/2022   Drug / Regimen Folfoxiri   Post Hydration -   Pre Meds given   Notes given           Mr. Giovani Lewis vitals were reviewed. Patient Vitals for the past 12 hrs:   Temp Pulse Resp BP SpO2   10/24/22 1434 97.4 °F (36.3 °C) 71 16 115/69 98 %   10/24/22 0755 96.9 °F (36.1 °C) 82 16 112/69 98 %         Lab results were obtained and reviewed. Recent Results (from the past 12 hour(s))   CBC WITH 3 PART DIFF    Collection Time: 10/24/22  8:03 AM   Result Value Ref Range    WBC 3.7 (L) 4.1 - 11.1 K/uL    RBC 3.52 (L) 4.10 - 5.70 M/uL    HGB 12.1 12.1 - 17.0 g/dL    HCT 35.6 (L) 36.6 - 50.3 %    .1 (H) 80.0 - 99.0 FL    MCH 34.4 (H) 26.0 - 34.0 PG    MCHC 34.0 30.0 - 36.5 g/dL    RDW 15.7 11.8 - 15.8 %    PLATELET 250 (L) 187 - 400 K/uL    NEUTROPHILS 46 32 - 75 %    Mixed cells 23 (H) 3.2 - 16.9 %    LYMPHOCYTES 31 12 - 49 %    ABS. NEUTROPHILS 1.7 (L) 1.8 - 8.0 K/UL    ABS. MIXED CELLS 0.9 0.2 - 1.2 K/uL    ABS.  LYMPHOCYTES 1.1 0.8 - 3.5 K/UL    DF AUTOMATED     METABOLIC PANEL, COMPREHENSIVE    Collection Time: 10/24/22  8:03 AM   Result Value Ref Range    Sodium 143 136 - 145 mmol/L    Potassium 3.7 3.5 - 5.1 mmol/L    Chloride 111 (H) 97 - 108 mmol/L    CO2 25 21 - 32 mmol/L    Anion gap 7 5 - 15 mmol/L    Glucose 100 65 - 100 mg/dL    BUN 11 6 - 20 MG/DL    Creatinine 0.88 0.70 - 1.30 MG/DL    BUN/Creatinine ratio 13 12 - 20      eGFR >60 >60 ml/min/1.73m2    Calcium 9.3 8.5 - 10.1 MG/DL    Bilirubin, total 0.4 0.2 - 1.0 MG/DL    ALT (SGPT) 46 12 - 78 U/L    AST (SGOT) 33 15 - 37 U/L    Alk.  phosphatase 331 (H) 45 - 117 U/L    Protein, total 6.2 (L) 6.4 - 8.2 g/dL    Albumin 3.2 (L) 3.5 - 5.0 g/dL    Globulin 3.0 2.0 - 4.0 g/dL    A-G Ratio 1.1 1.1 - 2.2         Medications received:  Medications Administered       dexamethasone (DECADRON) 12 mg in 0.9% sodium chloride 50 mL IVPB       Admin Date  10/24/2022 Action  New Bag Dose  12 mg Route  IntraVENous Administered By  Joanna Tapia RN              dextrose 5% infusion       Admin Date  10/24/2022 Action  New Bag Dose  25 mL/hr Rate  25 mL/hr Route  IntraVENous Administered By  Joanna Tapia RN              fluorouraciL (ADRUCIL) 4,080 mg in 0.9% sodium chloride 100 mL CADD Cassette       Admin Date  10/24/2022 Action  New Bag Dose  4,080 mg Rate  2.1 mL/hr Route  IntraVENous Administered By  Joanna Tapia RN              irinotecan (CAMPTOSAR) 252 mg in dextrose 5% 250 mL, overfill volume 25 mL chemo infusion       Admin Date  10/24/2022 Action  New Bag Dose  252 mg Rate  191.7 mL/hr Route  IntraVENous Administered By  Joanna Tapia RN              leucovorin (WELLCOVORIN) 680 mg in dextrose 5% 250 mL, overfill volume 25 mL IVPB       Admin Date  10/24/2022 Action  New Bag Dose  680 mg Rate  154.5 mL/hr Route  IntraVENous Administered By  Joanna Tapia RN              oxaliplatin (ELOXATIN) 144.5 mg in dextrose 5% 250 mL, overfill volume 25 mL chemo infusion       Admin Date  10/24/2022 Action  New Bag Dose  144.5 mg Rate  152 mL/hr Route  IntraVENous Administered By  Joanna Tapia RN              palonosetron HCl (ALOXI) injection 0.25 mg       Admin Date  10/24/2022 Action  Given Dose  0.25 mg Route  IntraVENous Administered By  Joanna Tapia RN              sodium chloride (NS) flush 10 mL       Admin Date  10/24/2022 Action  Given Dose  10 mL Route  IntraVENous Administered By  Joanna Tapia RN                     Two nurses verified prior to administering:    Drug name  Drug dose  Infusion volume or drug volume when prepared in a syringe  Rate of administration  Route of administration  Expiration dates and/or times  Appearance and physical integrity of the drugs  Rate set on infusion pump, when used  Sequencing of drug administration        Mr. Kristyn Jackman tolerated treatment well and was discharged from [place] [address] in stable condition at 1450. Port flushed and connected to infusing CADD pump . He is to return on  October 26, 2022 at 1530 for his next appointment.     Leonardo Ruby RN  October 24, 2022    Future Appointments:  Future Appointments   Date Time Provider Dahlia Supriya   10/26/2022  3:30 PM [facility]   11/1/2022  2:40 PM Keren Amador MD CAVSF [facility]   11/7/2022  7:30 AM [facility]   11/7/2022  8:45 AM Amanda BROOKS MD ONCSF [facility]   11/9/2022  3:30 PM [facility]   11/21/2022  7:30 AM [facility]   11/21/2022  9:30 AM Promise Orr MD PCS BS [facility]   11/23/2022  3:30 PM SS INF5 CH4 <1H [facility]

## 2022-10-24 NOTE — TELEPHONE ENCOUNTER
[address] at [place]  [phone]    10/24/22 11:04 AM - [employer] pick-up scheduled for patient's [employer] testing. Tracking . Confirmation [id number].

## 2022-10-26 ENCOUNTER — APPOINTMENT (OUTPATIENT)
Dept: INFUSION THERAPY | Age: 47
End: 2022-10-26

## 2022-10-26 ENCOUNTER — HOSPITAL ENCOUNTER (OUTPATIENT)
Dept: INFUSION THERAPY | Age: 47
Discharge: HOME | End: 2022-10-26
Payer: MEDICAID

## 2022-10-26 VITALS
SYSTOLIC BLOOD PRESSURE: 123 MMHG | OXYGEN SATURATION: 98 % | TEMPERATURE: 98.1 F | RESPIRATION RATE: 16 BRPM | BODY MASS INDEX: 22.7 KG/M2 | DIASTOLIC BLOOD PRESSURE: 72 MMHG | WEIGHT: 144.6 LBS | HEIGHT: 67 IN | HEART RATE: 76 BPM

## 2022-10-26 DIAGNOSIS — Z76.89 PREVENTION OF CHEMOTHERAPY-INDUCED NEUTROPENIA: ICD-10-CM

## 2022-10-26 DIAGNOSIS — C82.90 FOLLICULAR LYMPHOMA, UNSPECIFIED FOLLICULAR LYMPHOMA TYPE, UNSPECIFIED BODY REGION (HCC): ICD-10-CM

## 2022-10-26 DIAGNOSIS — T45.1X5A CHEMOTHERAPY INDUCED NEUTROPENIA (HCC): Primary | ICD-10-CM

## 2022-10-26 DIAGNOSIS — C18.9 COLON ADENOCARCINOMA (HCC): ICD-10-CM

## 2022-10-26 DIAGNOSIS — D70.1 CHEMOTHERAPY INDUCED NEUTROPENIA (HCC): Primary | ICD-10-CM

## 2022-10-26 PROCEDURE — 96523 IRRIG DRUG DELIVERY DEVICE: CPT

## 2022-10-26 PROCEDURE — 74011000250 HC RX REV CODE- 250: Performed by: INTERNAL MEDICINE

## 2022-10-26 PROCEDURE — 74011250636 HC RX REV CODE- 250/636: Performed by: INTERNAL MEDICINE

## 2022-10-26 RX ORDER — HEPARIN 100 UNIT/ML
300-500 SYRINGE INTRAVENOUS AS NEEDED
Status: DISCONTINUED | OUTPATIENT
Start: 2022-10-26 | End: 2022-10-27 | Stop reason: HOSPADM

## 2022-10-26 RX ORDER — SODIUM CHLORIDE 9 MG/ML
10 INJECTION INTRAMUSCULAR; INTRAVENOUS; SUBCUTANEOUS AS NEEDED
Status: DISCONTINUED | OUTPATIENT
Start: 2022-10-26 | End: 2022-10-27 | Stop reason: HOSPADM

## 2022-10-26 RX ORDER — SODIUM CHLORIDE 0.9 % (FLUSH) 0.9 %
10 SYRINGE (ML) INJECTION AS NEEDED
Status: DISCONTINUED | OUTPATIENT
Start: 2022-10-26 | End: 2022-10-27 | Stop reason: HOSPADM

## 2022-10-26 RX ADMIN — Medication 500 UNITS: at 15:40

## 2022-10-26 RX ADMIN — SODIUM CHLORIDE, PRESERVATIVE FREE 10 ML: 5 INJECTION INTRAVENOUS at 15:40

## 2022-10-26 NOTE — PROGRESS NOTES
Outpatient Infusion Center Short Visit Progress Note    Mr. Hayes Pimentel admitted to [facility] for Pump removal ambulatory in stable condition. Assessment completed. No new concerns voiced. CADD completed and infused all chemo content at 1445. Vital Signs:  Visit Vitals  /72   Pulse 76   Temp 98.1 °F (36.7 °C)   Resp 16   Ht 5' 7\" (1.702 m)   Wt 65.6 kg (144 lb 9.6 oz)   SpO2 98%   BMI 22.65 kg/m²           Medications:  Medications Administered       0.9% sodium chloride injection 10 mL       Admin Date  10/26/2022 Action  Given Dose  10 mL Route  IntraVENous Administered By  Vince Carrillo RN              heparin (porcine) pf 300-500 Units       Admin Date  10/26/2022 Action  Given Dose  500 Units Route  InterCATHeter Administered By  Vince Carrillo RN                      AVS declined. Patient tolerated treatment well. Chest port flushed, heparinized and de-accessed per protocol. Patient discharged from [place] [address] ambulatory in no distress at 1540. Patient will be back on 11/07/22 at 0730 of next appointment.     Rodriguez Lacey RN  October 26, 2022    Future Appointments   Date Time Provider Dahlia Epps   11/1/2022  2:40 PM Amelia Amador MD CAVSF BS [facility]   11/7/2022  7:30 AM SS INF2 CH2 >7H [facility]   11/7/2022  8:45 AM Junie BROOKS MD ONCSF BS [facility]   11/9/2022  3:30 PM SS INF5 CH4 <1H [facility]   11/21/2022  7:30 AM SS INF5 CH1 >7H [facility]   11/21/2022  9:30 AM Justice Fleming MD PCS BS [facility]   11/23/2022  3:30 PM [facility]

## 2022-10-28 ENCOUNTER — TELEPHONE (OUTPATIENT)
Dept: PALLATIVE CARE | Age: 47
End: 2022-10-28

## 2022-10-28 DIAGNOSIS — G89.3 CANCER ASSOCIATED PAIN: Primary | ICD-10-CM

## 2022-10-28 RX ORDER — PREGABALIN 50 MG/1
50 CAPSULE ORAL 2 TIMES DAILY
Qty: 20 CAPSULE | Refills: 0 | Status: SHIPPED | OUTPATIENT
Start: 2022-10-28 | End: 2022-11-28

## 2022-10-28 NOTE — TELEPHONE ENCOUNTER
Palliative Medicine  Nursing Note  [phone]  Fax [phone]      Telephone Call  Patient Name: Ward Mcmillan. YOB: 1975    10/28/2022        Primary Decision Maker: Dolores Billy - Girlfriend - [phone]   Advance Care Planning 10/11/2022   Patient's Healthcare Decision Maker is: Legal Next of Kin   Confirm Advance Directive None   Patient Would Like to Complete Advance Directive -   Does the patient have other document types -     Incoming call from Mr. Rae Nova. He shared that the day of Chemo and up to a week after he has right side only shooting pains from his shoulder, along the sides of his abdomen, and down his legs. Chemo is every 2 weeks. He said that it occurs with any movement either during the day or at night, even a yawn will trigger the shooting pain. He shared that the pain is severe and his pain medications do not seem to help with this. It keeps him up, or will wake him up at night. He contemplated going to the ED last night, but would rather not go. He told Oncology about the pain who suggested he speak with Palliative. His current pain medication is Oxycodone 10mg  every 4 prn, and Oxycontin 10mg 1 every 12 hours. He shared that he has full sensation without sensitivity to cold or heat, and no burning or tingling sensations to his hands, feet, and extremities. He is not aware of any new cancer growth except to some lymph nodes in his abdomen. He just had a CT scan on 10/10/22. He is requesting help with this pain. Dr. Carley Villanueva made aware. IMPRESSION  Increase in peritoneal disease compared to the prior examination. Stable  mesenteric infiltrate. Improvement in the mass lesion involving the transverse  colon. RECIST:  Target lesion:  Transverse colon mass, series 2, image 76, 1.7 x 1.3 cm, previously 2.7 x 2.6  cm. Nontarget lesions:   Transverse mesial colic lymph nodes unchanged.       Per Dr. Carley Villanueva, Lyrica 50mg called in to help with neuropathy. Call placed to Mr. Brayden Womack and informed of the above. He was appreciative. Discussed that the medication needs time to build up in his system, but the goal is to decrease the shooting nerve pain that he is describing. He verbalized understanding.          Fern Sandoval RN  Palliative Medicine

## 2022-10-28 NOTE — TELEPHONE ENCOUNTER
This is challenging since I do not know the true source of these new pains. If the opioids are not helping at all, then I do not recommend an increase. Let's try a starting dose of Lyrica over the weekend. I sent 50 mg BID x 10 days to his [facility].  Please follow up on Monday and let Dr. Arcadio López know if any change.      Dr. Anny Avalos

## 2022-10-30 ENCOUNTER — TELEPHONE (OUTPATIENT)
Dept: PALLATIVE CARE | Age: 47
End: 2022-10-30

## 2022-10-30 DIAGNOSIS — G89.3 CANCER ASSOCIATED PAIN: Primary | ICD-10-CM

## 2022-10-30 RX ORDER — OXYCODONE HCL 10 MG/1
10 TABLET, FILM COATED, EXTENDED RELEASE ORAL 2 TIMES DAILY
Qty: 60 TABLET | Refills: 0 | Status: SHIPPED | OUTPATIENT
Start: 2022-10-30 | End: 2022-11-28 | Stop reason: SDUPTHER

## 2022-10-30 NOTE — TELEPHONE ENCOUNTER
Palliative on-call    Received a call from patient will need a refill of his OxyContin extended release 10 mg twice a day. Reviewed the chart and patient notes. He has been taking OxyContin 10 mg twice a day as well as oxycodone immediate release 10 mg every 4 hours as needed for breakthrough pain. I did review his  and his last refill of the OxyContin was on 9/30/2022. He stated he called last week for a refill-I cannot see any note in the chart on this request.  I have refilled his OxyContin 10 mg twice daily, #60, refills none.   This was electronically sent

## 2022-10-31 ENCOUNTER — APPOINTMENT (OUTPATIENT)
Dept: INFUSION THERAPY | Age: 47
End: 2022-10-31
Payer: MEDICAID

## 2022-11-02 ENCOUNTER — APPOINTMENT (OUTPATIENT)
Dept: INFUSION THERAPY | Age: 47
End: 2022-11-02

## 2022-11-04 DIAGNOSIS — C18.9 COLON ADENOCARCINOMA (HCC): ICD-10-CM

## 2022-11-04 DIAGNOSIS — R10.84 ABDOMINAL PAIN, GENERALIZED: ICD-10-CM

## 2022-11-04 DIAGNOSIS — C78.6 PERITONEAL CARCINOMATOSIS (HCC): ICD-10-CM

## 2022-11-04 RX ORDER — OXYCODONE HYDROCHLORIDE 10 MG/1
10 TABLET ORAL
Qty: 90 TABLET | Refills: 0 | Status: SHIPPED | OUTPATIENT
Start: 2022-11-04 | End: 2022-11-14 | Stop reason: SDUPTHER

## 2022-11-04 NOTE — TELEPHONE ENCOUNTER
Triage for Controlled Substance Refill Request     Pain Diagnosis: _Colon adenocarcinoma ([facility] [address] (C18.9); Abdominal pain, generalized (R10.84); Peritoneal carcinomatosis ([facility] [address] (C78.6)     Last Outpatient Visit: _10/11/2022     Next Outpatient Visit: _11/21/2022     Reason for refill needed outside of office visit? Appointment not scheduled prior to need for scheduled refill        Pharmacy: _[facility]/pharmacy [address] [place] [address]      Medication:oxyCODONE IR (ROXICODONE) 10 mg tab immediate release tablet     Dose and directions: Take 1 Tablet by mouth every four (4) hours as needed for Pain for up to 15 days.   Number dispensed:90  Date filled ( or Pharmacy):10/21/2022  # left:0         reviewed: _yes     Date of Urine Drug Screen:  _8/22/2022     Opioid Safety Handout given:  _yes     Appropriate for refill:  _yes     Action:  _ Medication pending

## 2022-11-04 NOTE — TELEPHONE ENCOUNTER
Received call from patient requesting Oxycodone IR refill. Patient has 0 pills remaining. To be called in to [facility] pharmacy.

## 2022-11-07 ENCOUNTER — HOSPITAL ENCOUNTER (OUTPATIENT)
Dept: INFUSION THERAPY | Age: 47
Discharge: HOME | End: 2022-11-07
Payer: MEDICAID

## 2022-11-07 ENCOUNTER — OFFICE VISIT (OUTPATIENT)
Dept: ONCOLOGY | Age: 47
End: 2022-11-07
Payer: MEDICAID

## 2022-11-07 VITALS
TEMPERATURE: 97.7 F | DIASTOLIC BLOOD PRESSURE: 94 MMHG | HEART RATE: 58 BPM | OXYGEN SATURATION: 99 % | WEIGHT: 144.4 LBS | HEIGHT: 67 IN | BODY MASS INDEX: 22.66 KG/M2 | SYSTOLIC BLOOD PRESSURE: 140 MMHG | RESPIRATION RATE: 16 BRPM

## 2022-11-07 VITALS
RESPIRATION RATE: 16 BRPM | OXYGEN SATURATION: 99 % | HEART RATE: 74 BPM | HEIGHT: 67 IN | DIASTOLIC BLOOD PRESSURE: 80 MMHG | SYSTOLIC BLOOD PRESSURE: 132 MMHG | TEMPERATURE: 97.7 F | BODY MASS INDEX: 22.7 KG/M2 | WEIGHT: 144.6 LBS

## 2022-11-07 DIAGNOSIS — Z51.11 CHEMOTHERAPY MANAGEMENT, ENCOUNTER FOR: ICD-10-CM

## 2022-11-07 DIAGNOSIS — D70.1 CHEMOTHERAPY INDUCED NEUTROPENIA (HCC): Primary | ICD-10-CM

## 2022-11-07 DIAGNOSIS — R53.0 NEOPLASTIC MALIGNANT RELATED FATIGUE: ICD-10-CM

## 2022-11-07 DIAGNOSIS — C18.9 COLON ADENOCARCINOMA (HCC): ICD-10-CM

## 2022-11-07 DIAGNOSIS — T45.1X5A CHEMOTHERAPY INDUCED NEUTROPENIA (HCC): Primary | ICD-10-CM

## 2022-11-07 DIAGNOSIS — Z76.89 PREVENTION OF CHEMOTHERAPY-INDUCED NEUTROPENIA: ICD-10-CM

## 2022-11-07 DIAGNOSIS — R10.31 RLQ ABDOMINAL PAIN: ICD-10-CM

## 2022-11-07 DIAGNOSIS — T45.1X5A CHEMOTHERAPY-INDUCED THROMBOCYTOPENIA: ICD-10-CM

## 2022-11-07 DIAGNOSIS — C18.4 CARCINOMA OF TRANSVERSE COLON (HCC): Primary | ICD-10-CM

## 2022-11-07 DIAGNOSIS — C82.90 FOLLICULAR LYMPHOMA, UNSPECIFIED FOLLICULAR LYMPHOMA TYPE, UNSPECIFIED BODY REGION (HCC): ICD-10-CM

## 2022-11-07 DIAGNOSIS — D69.59 CHEMOTHERAPY-INDUCED THROMBOCYTOPENIA: ICD-10-CM

## 2022-11-07 LAB
ALBUMIN SERPL-MCNC: 3 G/DL (ref 3.5–5)
ALBUMIN/GLOB SERPL: 0.8 {RATIO} (ref 1.1–2.2)
ALP SERPL-CCNC: 366 U/L (ref 45–117)
ALT SERPL-CCNC: 47 U/L (ref 12–78)
ANION GAP SERPL CALC-SCNC: 3 MMOL/L (ref 5–15)
AST SERPL-CCNC: 40 U/L (ref 15–37)
BASOPHILS # BLD: 0 K/UL (ref 0–0.1)
BASOPHILS NFR BLD: 0 % (ref 0–1)
BILIRUB SERPL-MCNC: 0.3 MG/DL (ref 0.2–1)
BUN SERPL-MCNC: 9 MG/DL (ref 6–20)
BUN/CREAT SERPL: 9 (ref 12–20)
CALCIUM SERPL-MCNC: 9.4 MG/DL (ref 8.5–10.1)
CEA SERPL-MCNC: 21.5 NG/ML
CHLORIDE SERPL-SCNC: 112 MMOL/L (ref 97–108)
CO2 SERPL-SCNC: 28 MMOL/L (ref 21–32)
CREAT SERPL-MCNC: 0.96 MG/DL (ref 0.7–1.3)
DIFFERENTIAL METHOD BLD: ABNORMAL
EOSINOPHIL # BLD: 0.1 K/UL (ref 0–0.4)
EOSINOPHIL NFR BLD: 6 % (ref 0–7)
ERYTHROCYTE [DISTWIDTH] IN BLOOD BY AUTOMATED COUNT: 14.3 % (ref 11.5–14.5)
GLOBULIN SER CALC-MCNC: 3.6 G/DL (ref 2–4)
GLUCOSE SERPL-MCNC: 140 MG/DL (ref 65–100)
HCT VFR BLD AUTO: 33.3 % (ref 36.6–50.3)
HGB BLD-MCNC: 11 G/DL (ref 12.1–17)
IMM GRANULOCYTES # BLD AUTO: 0 K/UL
IMM GRANULOCYTES NFR BLD AUTO: 0 %
LYMPHOCYTES # BLD: 0.7 K/UL (ref 0.8–3.5)
LYMPHOCYTES NFR BLD: 35 % (ref 12–49)
MCH RBC QN AUTO: 33.4 PG (ref 26–34)
MCHC RBC AUTO-ENTMCNC: 33 G/DL (ref 30–36.5)
MCV RBC AUTO: 101.2 FL (ref 80–99)
MONOCYTES # BLD: 0.2 K/UL (ref 0–1)
MONOCYTES NFR BLD: 10 % (ref 5–13)
NEUTS BAND NFR BLD MANUAL: 2 % (ref 0–6)
NEUTS SEG # BLD: 1.1 K/UL (ref 1.8–8)
NEUTS SEG NFR BLD: 47 % (ref 32–75)
NRBC # BLD: 0 K/UL (ref 0–0.01)
NRBC BLD-RTO: 0 PER 100 WBC
PLATELET # BLD AUTO: 93 K/UL (ref 150–400)
PMV BLD AUTO: 10.4 FL (ref 8.9–12.9)
POTASSIUM SERPL-SCNC: 3.5 MMOL/L (ref 3.5–5.1)
PROT SERPL-MCNC: 6.6 G/DL (ref 6.4–8.2)
RBC # BLD AUTO: 3.29 M/UL (ref 4.1–5.7)
RBC MORPH BLD: ABNORMAL
SODIUM SERPL-SCNC: 143 MMOL/L (ref 136–145)
WBC # BLD AUTO: 2.1 K/UL (ref 4.1–11.1)
WBC MORPH BLD: ABNORMAL

## 2022-11-07 PROCEDURE — 96375 TX/PRO/DX INJ NEW DRUG ADDON: CPT

## 2022-11-07 PROCEDURE — 3074F SYST BP LT 130 MM HG: CPT | Performed by: INTERNAL MEDICINE

## 2022-11-07 PROCEDURE — 74011000258 HC RX REV CODE- 258: Performed by: NURSE PRACTITIONER

## 2022-11-07 PROCEDURE — 96413 CHEMO IV INFUSION 1 HR: CPT

## 2022-11-07 PROCEDURE — 96368 THER/DIAG CONCURRENT INF: CPT

## 2022-11-07 PROCEDURE — 80053 COMPREHEN METABOLIC PANEL: CPT

## 2022-11-07 PROCEDURE — 99215 OFFICE O/P EST HI 40 MIN: CPT | Performed by: INTERNAL MEDICINE

## 2022-11-07 PROCEDURE — 85025 COMPLETE CBC W/AUTO DIFF WBC: CPT

## 2022-11-07 PROCEDURE — 96416 CHEMO PROLONG INFUSE W/PUMP: CPT

## 2022-11-07 PROCEDURE — 36415 COLL VENOUS BLD VENIPUNCTURE: CPT

## 2022-11-07 PROCEDURE — 77030012965 HC NDL HUBR BBMI -A

## 2022-11-07 PROCEDURE — 96415 CHEMO IV INFUSION ADDL HR: CPT

## 2022-11-07 PROCEDURE — 96417 CHEMO IV INFUS EACH ADDL SEQ: CPT

## 2022-11-07 PROCEDURE — 74011250636 HC RX REV CODE- 250/636: Performed by: NURSE PRACTITIONER

## 2022-11-07 PROCEDURE — 96366 THER/PROPH/DIAG IV INF ADDON: CPT

## 2022-11-07 PROCEDURE — 82378 CARCINOEMBRYONIC ANTIGEN: CPT

## 2022-11-07 PROCEDURE — 3078F DIAST BP <80 MM HG: CPT | Performed by: INTERNAL MEDICINE

## 2022-11-07 RX ORDER — SODIUM CHLORIDE 9 MG/ML
25 INJECTION, SOLUTION INTRAVENOUS CONTINUOUS
Status: DISCONTINUED | OUTPATIENT
Start: 2022-11-07 | End: 2022-11-09 | Stop reason: HOSPADM

## 2022-11-07 RX ORDER — PALONOSETRON 0.05 MG/ML
0.25 INJECTION, SOLUTION INTRAVENOUS ONCE
Status: COMPLETED | OUTPATIENT
Start: 2022-11-07 | End: 2022-11-07

## 2022-11-07 RX ORDER — HEPARIN 100 UNIT/ML
300-500 SYRINGE INTRAVENOUS AS NEEDED
Status: ACTIVE | OUTPATIENT
Start: 2022-11-07 | End: 2022-11-07

## 2022-11-07 RX ORDER — DEXTROSE MONOHYDRATE 50 MG/ML
25 INJECTION, SOLUTION INTRAVENOUS CONTINUOUS
Status: DISPENSED | OUTPATIENT
Start: 2022-11-07 | End: 2022-11-07

## 2022-11-07 RX ORDER — SODIUM CHLORIDE 0.9 % (FLUSH) 0.9 %
10 SYRINGE (ML) INJECTION AS NEEDED
Status: DISPENSED | OUTPATIENT
Start: 2022-11-07 | End: 2022-11-07

## 2022-11-07 RX ORDER — SODIUM CHLORIDE 9 MG/ML
10 INJECTION INTRAMUSCULAR; INTRAVENOUS; SUBCUTANEOUS AS NEEDED
Status: ACTIVE | OUTPATIENT
Start: 2022-11-07 | End: 2022-11-07

## 2022-11-07 RX ADMIN — LEUCOVORIN CALCIUM 680 MG: 500 INJECTION, POWDER, LYOPHILIZED, FOR SOLUTION INTRAMUSCULAR; INTRAVENOUS at 12:27

## 2022-11-07 RX ADMIN — DEXTROSE MONOHYDRATE 252 MG: 5 INJECTION, SOLUTION INTRAVENOUS at 10:44

## 2022-11-07 RX ADMIN — DEXAMETHASONE SODIUM PHOSPHATE 12 MG: 4 INJECTION, SOLUTION INTRAMUSCULAR; INTRAVENOUS at 09:36

## 2022-11-07 RX ADMIN — PALONOSETRON 0.25 MG: 0.05 INJECTION, SOLUTION INTRAVENOUS at 09:34

## 2022-11-07 RX ADMIN — FLUOROURACIL 4080 MG: 50 INJECTION, SOLUTION INTRAVENOUS at 14:44

## 2022-11-07 RX ADMIN — OXALIPLATIN 144.5 MG: 5 INJECTION, SOLUTION, CONCENTRATE INTRAVENOUS at 12:29

## 2022-11-07 RX ADMIN — DEXTROSE MONOHYDRATE 25 ML/HR: 50 INJECTION, SOLUTION INTRAVENOUS at 09:32

## 2022-11-07 NOTE — PROGRESS NOTES
[facility] Progress Note    Date: 2022    Name: Mandy Pastor. MRN: [id number]         : 1975      SBAR received from Michael Mendez RN. Chemotherapy infusing per order.       Medications:      Medications Administered       dexamethasone (DECADRON) 12 mg in 0.9% sodium chloride 50 mL IVPB       Admin Date  2022 Action  New Bag Dose  12 mg Route  IntraVENous Administered By  Bird Mark RN              dextrose 5% infusion       Admin Date  2022 Action  New Bag Dose  25 mL/hr Rate  25 mL/hr Route  IntraVENous Administered By  Bird Mark RN              fluorouraciL (ADRUCIL) 4,080 mg in 0.9% sodium chloride 100 mL CADD Cassette       Admin Date  2022 Action  New Bag Dose  4,080 mg Rate  2.1 mL/hr Route  IntraVENous Administered By  Danna Pulliam RN              irinotecan (CAMPTOSAR) 252 mg in dextrose 5% 250 mL, overfill volume 25 mL chemo infusion       Admin Date  2022 Action  New Bag Dose  252 mg Rate  191.7 mL/hr Route  IntraVENous Administered By  Bird Mark RN              leucovorin (WELLCOVORIN) 680 mg in dextrose 5% 250 mL, overfill volume 25 mL IVPB       Admin Date  2022 Action  New Bag Dose  680 mg Rate  154.5 mL/hr Route  IntraVENous Administered By  Bird Mark RN              oxaliplatin (ELOXATIN) 144.5 mg in dextrose 5% 250 mL, overfill volume 25 mL chemo infusion       Admin Date  2022 Action  New Bag Dose  144.5 mg Rate  152 mL/hr Route  IntraVENous Administered By  Bird Mark RN              palonosetron HCl (ALOXI) injection 0.25 mg       Admin Date  2022 Action  Given Dose  0.25 mg Route  IntraVENous Administered By  Bird Mark RN                      Two nurses verified prior to administering: Drug name, drug dose, infusion volume or drug volume when prepared in a syringe, rate of administration, route of administration,  expiration dates and/or times, appearance and physical integrity of the drugs, rate set on infusion pump, when used sequencing of drug administration. Mr. Nicho Morjeon tolerated treatment well and was discharged from [place] [address] in stable condition. Pump connected to patient and infusing per order. He is to return on 11/9/22 for his next appointment.     Kayla Rodriguez RN  November 7, 2022

## 2022-11-07 NOTE — PROGRESS NOTES
Rm    Chief Complaint   Patient presents with    Colon Cancer     2 week follow up        Visit Vitals  /80 (BP 1 Location: Left upper arm, BP Patient Position: Sitting)   Pulse 74   Temp 97.7 °F (36.5 °C) (Temporal)   Resp 16   Ht 5' 7\" (1.702 m)   Wt 144 lb 9.6 oz (65.6 kg)   SpO2 99%   BMI 22.65 kg/m²        1. Have you been to the ER, urgent care clinic since your last visit? Hospitalized since your last visit? No    2. Have you seen or consulted any other health care providers outside of the [address] [place] [address] since your last visit? Include any pap smears or colon screening. No     Health Maintenance Due   Topic Date Due    Pneumococcal 0-64 years (1 - PCV) Never done    DTaP/Tdap/Td series (1 - Tdap) Never done    Lipid Screen  09/28/2017    COVID-19 Vaccine (4 - Booster) 07/27/2021    Flu Vaccine (1) Never done    Colorectal Cancer Screening Combo  Never done        3 most recent PHQ Screens 11/7/2022   Little interest or pleasure in doing things Not at all   Feeling down, depressed, irritable, or hopeless Not at all   Total Score PHQ 2 0   Trouble falling or staying asleep, or sleeping too much -   Feeling tired or having little energy -   Poor appetite, weight loss, or overeating -   Feeling bad about yourself - or that you are a failure or have let yourself or your family down -   Trouble concentrating on things such as school, work, reading, or watching TV -   Moving or speaking so slowly that other people could have noticed; or the opposite being so fidgety that others notice -   Thoughts of being better off dead, or hurting yourself in some way -   PHQ 9 Score -        Fall Risk Assessment, last 12 mths 10/11/2022   Able to walk? Yes   Fall in past 12 months? 0   Number of falls in past 12 months -   Fall with injury?  -       Learning Assessment 1/17/2019   PRIMARY LEARNER Patient   PRIMARY LANGUAGE ENGLISH   LEARNER PREFERENCE PRIMARY LISTENING   ANSWERED BY patient   RELATIONSHIP SELF

## 2022-11-07 NOTE — PROGRESS NOTES
Outpatient Infusion Center - Chemotherapy Progress Note    7596 Pt admit to [facility] for C11D1 Folfoxiri in stable condition. Assessment completed. No new concerns voiced. PAC with positive blood return. Labs were drawn and sent for processing. Pt proceeded to scheduled appt.       Chemotherapy Flowsheet 11/7/2022   Cycle C11D1   Date 11/7/2022   Drug / Regimen Folfoxiri   Post Hydration -   Pre Meds -   Notes -         VS  Patient Vitals for the past 12 hrs:   Temp Pulse Resp BP SpO2   11/07/22 0737 97.7 °F (36.5 °C) 74 16 132/80 99 %         Medications:  Medications Administered       dexamethasone (DECADRON) 12 mg in 0.9% sodium chloride 50 mL IVPB       Admin Date  11/07/2022 Action  New Bag Dose  12 mg Route  IntraVENous Administered By  Anastacio Wharton RN              dextrose 5% infusion       Admin Date  11/07/2022 Action  New Bag Dose  25 mL/hr Rate  25 mL/hr Route  IntraVENous Administered By  Anastacio Wharton RN              irinotecan (CAMPTOSAR) 252 mg in dextrose 5% 250 mL, overfill volume 25 mL chemo infusion       Admin Date  11/07/2022 Action  New Bag Dose  252 mg Rate  191.7 mL/hr Route  IntraVENous Administered By  Anastacio Wharton RN              leucovorin (WELLCOVORIN) 680 mg in dextrose 5% 250 mL, overfill volume 25 mL IVPB       Admin Date  11/07/2022 Action  New Bag Dose  680 mg Rate  154.5 mL/hr Route  IntraVENous Administered By  Anastacio Wharton RN              oxaliplatin (ELOXATIN) 144.5 mg in dextrose 5% 250 mL, overfill volume 25 mL chemo infusion       Admin Date  11/07/2022 Action  New Bag Dose  144.5 mg Rate  152 mL/hr Route  IntraVENous Administered By  Anastacio Wharton RN              palonosetron HCl (ALOXI) injection 0.25 mg       Admin Date  11/07/2022 Action  Given Dose  0.25 mg Route  IntraVENous Administered By  Anastacio Wharton RN                      Two nurses verified prior to administering:  drug name, drug dose, infusion volume or drug volume when prepared in a syringe, rate of administration, route of administration, expiration dates and/or times, appearance and physical integrity of the drugs, rate set on infusion pump, when used, sequencing of drug administration    Pt aware of next appointment scheduled for 11/9. SBAR given to Arlette Mehta (R) (from the past 12 hour(s))   CBC WITH AUTOMATED DIFF    Collection Time: 11/07/22  7:42 AM   Result Value Ref Range    WBC 2.1 (L) 4.1 - 11.1 K/uL    RBC 3.29 (L) 4.10 - 5.70 M/uL    HGB 11.0 (L) 12.1 - 17.0 g/dL    HCT 33.3 (L) 36.6 - 50.3 %    .2 (H) 80.0 - 99.0 FL    MCH 33.4 26.0 - 34.0 PG    MCHC 33.0 30.0 - 36.5 g/dL    RDW 14.3 11.5 - 14.5 %    PLATELET 93 (L) 684 - 400 K/uL    MPV 10.4 8.9 - 12.9 FL    NRBC 0.0 0  WBC    ABSOLUTE NRBC 0.00 0.00 - 0.01 K/uL    NEUTROPHILS 47 32 - 75 %    BAND NEUTROPHILS 2 0 - 6 %    LYMPHOCYTES 35 12 - 49 %    MONOCYTES 10 5 - 13 %    EOSINOPHILS 6 0 - 7 %    BASOPHILS 0 0 - 1 %    IMMATURE GRANULOCYTES 0 %    ABS. NEUTROPHILS 1.1 (L) 1.8 - 8.0 K/UL    ABS. LYMPHOCYTES 0.7 (L) 0.8 - 3.5 K/UL    ABS. MONOCYTES 0.2 0.0 - 1.0 K/UL    ABS. EOSINOPHILS 0.1 0.0 - 0.4 K/UL    ABS. BASOPHILS 0.0 0.0 - 0.1 K/UL    ABS. IMM. GRANS. 0.0 K/UL    DF MANUAL      RBC COMMENTS MACROCYTOSIS  1+        WBC COMMENTS ATYPICAL LYMPHOCYTES PRESENT     METABOLIC PANEL, COMPREHENSIVE    Collection Time: 11/07/22  7:42 AM   Result Value Ref Range    Sodium 143 136 - 145 mmol/L    Potassium 3.5 3.5 - 5.1 mmol/L    Chloride 112 (H) 97 - 108 mmol/L    CO2 28 21 - 32 mmol/L    Anion gap 3 (L) 5 - 15 mmol/L    Glucose 140 (H) 65 - 100 mg/dL    BUN 9 6 - 20 MG/DL    Creatinine 0.96 0.70 - 1.30 MG/DL    BUN/Creatinine ratio 9 (L) 12 - 20      eGFR >60 >60 ml/min/1.73m2    Calcium 9.4 8.5 - 10.1 MG/DL    Bilirubin, total 0.3 0.2 - 1.0 MG/DL    ALT (SGPT) 47 12 - 78 U/L    AST (SGOT) 40 (H) 15 - 37 U/L    Alk.  phosphatase 366 (H) 45 - 117 U/L Protein, total 6.6 6.4 - 8.2 g/dL    Albumin 3.0 (L) 3.5 - 5.0 g/dL    Globulin 3.6 2.0 - 4.0 g/dL    A-G Ratio 0.8 (L) 1.1 - 2.2

## 2022-11-07 NOTE — PROGRESS NOTES
[id number] [facility] at [facility]  [phone]    Hematology / Oncology Established Visit    Reason for Visit:   Yue Finney is a 41 y.o. male who is seen for urgent follow up of colon cancer, h/o lymphoma. Hematology Oncology Treatment History:     Diagnosis #1: Follicular lymphoma  Stage: IV  Pathology:   11/13/18 right inguinal LN excision: Follicular lymphoma, high-grade (grade 3a of 3). Prior Treatment:   1. Obinutuzumab-CHOP. Obinutuzumab: 1000 mg weekly on days 1, 8, 15 for cycle 1, then 1000 mg on day 1 q21 days for cycles 2-6, then monotherapy 1000 mg every 21 days for cycle 7, 8 with Cyclophosphamide 750mg/m2, Doxorubicin 50mg/m2, Vincristine 1.4mg/m2 on day 1 and Prednisone 100mg on Days 1-5, every 21 days for a total of 2 cycles completed late 1/2019. Regimen discontinued due to STEMI. 2. Obinutuzumab + Bendamustine: 1000 mg Obinutuzumab on day 1 + Bendamustine 90mg/m2 on days 1-2 on a 28-day cycle x 4 cycles, completed 5/2019  Current Treatment: Surveillance      Diagnosis #2: Colon cancer:  Stage: IV  Pathology:  Ascending colon; biopsy: poorly differentiated carcinoma  Comment: No dysplasia is identified. Immunohistochemical stains performed on block 1A, show the tumor positive for Cam5.2 and shows patchy positivity for CDX2 with a Ki-67 index of -90%; the tumor is focally positive for CK5/6 and GATA3; negative for p63, Pax8, CK7, CK20, panmelanoma, synaptophysin and chromogranin. The immunophenotypic features are nonspecific but argues somewhat against the following carcinoma: urothelial, neuroendocrine and breast. The immunophenotypic features also argues against melanoma and somewhat against classic colorectal carcinoma. Additional immunohistochemical stains to exclude the possibility of prostate and hepatocellular carcinoma have been   ordered; the results will be reported as an addendum.   -MLH1/MSH2/MSH6/PMS2 all intact with no loss of nuclear expression of MMR proteins - no MSI   Addendum: The addendum is issued to report the results of additional immunohistochemical stains in an attempt to ascertain the primary site of the carcinoma. The diagnosis is unchanged. Hepar and glypican 3 immunohistochemical stains are neg, arguing against hepatocellular carcinoma. PSA stain is negative, arguing against prostatic primary. Imaging studies to eval for poss primary sites maybe useful. In the absence of carcinoma/tumor at any other sites, this may represent an undifferentiated carcinoma of the colon.  -Oncogenomics (molecular) studies: POLE< ARID1A, YVONNE, ATR, BRCA2, CHECK1, FANCI, NF1, PIK3CA, PTCH1, PTEN, RAD50, RAD51, RB1, SMARCA4, STK11    Prior Treatment:   1. Loop ileostomy 2/19/22  2. Diagnotic laparoscopy with peritoneal biopsy, 4/27/22    Current Treatment: FOLFOXIRI every 2 weeks until disease progression or toxicity, 5/16/22 - current      Oncologic History:  Was in his usual state of health in early November 2018 when he developed low back pain and presented to the ER. Work-up at outside hospital revealed a retroperitoneal mass seen on CT imaging, and he was transferred to [facility] for further work-up. CT there showed a large retroperitoneal mass encircling the aorta with invasion of the left renal hilum and left adrenal gland. There were bilateral inguinal lymph nodes and moderate left hydronephrosis. He was evaluated while at [facility] and was noted to have palpable nodes in his groin for approximately the past 1 month. He underwent excisional LN biopsy of right inguinal LN, which revealed follicular lymphoma. At time of diagnosis, he had no cardiac disease aside from HTN, hyperlipidemia. However, he did have an NSTEMI after cycle 2 of O-CHOP. Was likely unrelated to chemotherapy, but opted to switch treatment to Obinutuzumab-Bendamustine. He completed treatment in 5/2019 and had a CR based on PET.     History of Present Illness:   Mr. Katya Hassan is a 41 y.o. male with is seen for follow up of colon cancer and C10 of treatment. He completed CT imaging. Patient reports insomnia. He states that Melatonin does not help. Reports neuropathy in feet. Denies dropping items. Denies fever, chills,chest pain, SOB. Reports adequate appetite and hydration. PMHx: Lymphoma in 2018, CAD, HTN, Hyperlipidemia, Anxiety, chronic low back pain  PSurgHx: None aside from cardiac stent placement and port placement  SHx: Smokes 1/2ppd x past 20 yrs. Works part time as a cook. Has 2 children. FHx: Father had leukemia, passed away from pancreatic cancer. Review of Systems: A complete review of systems was obtained, negative except as described above. Physical Exam:     Visit Vitals  /80 (BP 1 Location: Left upper arm, BP Patient Position: Sitting)   Pulse 74   Temp 97.7 °F (36.5 °C) (Temporal)   Resp 16   Ht 5' 7\" (1.702 m)   Wt 144 lb 9.6 oz (65.6 kg)   SpO2 99%   BMI 22.65 kg/m²         ECOG PS: 0  General: no distress  Eyes: anicteric sclerae  HENT: oropharynx clear  Neck: supple  Lymphatic: no cervical, supraclavicular adenopathy  Respiratory: CTAB, normal respiratory effort  CV: no peripheral edema, port upper chest   GI: soft, nontender, nondistended, no masses;  colostomy in place. Skin: no rashes; no ecchymoses; no petechiae      Results:     Lab Results   Component Value Date/Time    WBC 2.1 (L) 11/07/2022 07:42 AM    HGB 11.0 (L) 11/07/2022 07:42 AM    HCT 33.3 (L) 11/07/2022 07:42 AM    PLATELET 93 (L) 96/73/8312 07:42 AM    .2 (H) 11/07/2022 07:42 AM    ABS.  NEUTROPHILS PENDING 11/07/2022 07:42 AM    Hemoglobin (POC) 15.0 06/05/2009 02:13 PM    Hematocrit (POC) 39 02/14/2019 01:24 PM     Lab Results   Component Value Date/Time    Sodium 143 11/07/2022 07:42 AM    Potassium 3.5 11/07/2022 07:42 AM    Chloride 112 (H) 11/07/2022 07:42 AM    CO2 28 11/07/2022 07:42 AM    Glucose 140 (H) 11/07/2022 07:42 AM    BUN 9 11/07/2022 07:42 AM Creatinine 0.96 2022 07:42 AM    GFR est AA >60 10/03/2022 07:50 AM    GFR est non-AA >60 10/03/2022 07:50 AM    Calcium 9.4 2022 07:42 AM    Sodium (POC) 136 2019 01:24 PM    Potassium (POC) 3.9 2019 01:24 PM    Chloride (POC) 102 2019 01:24 PM    Glucose (POC) 249 (H) 02/15/2019 10:21 PM    BUN (POC) 14 2019 01:24 PM    Creatinine (POC) 0.9 2019 01:24 PM    Calcium, ionized (POC) 1.24 2019 01:24 PM     Lab Results   Component Value Date/Time    Bilirubin, total 0.3 2022 07:42 AM    ALT (SGPT) 47 2022 07:42 AM    Alk. phosphatase 366 (H) 2022 07:42 AM    Protein, total 6.6 2022 07:42 AM    Albumin 3.0 (L) 2022 07:42 AM    Globulin 3.6 2022 07:42 AM     Lab Results   Component Value Date/Time    Iron 18 (L) 2022 11:13 AM    TIBC 173 (L) 2022 11:13 AM    Iron % saturation 10 (L) 2022 11:13 AM    Ferritin 426 (H) 2022 11:13 AM       No results found for: B12LT, FOL, RBCF  Lab Results   Component Value Date/Time    TSH 1.53 2016 04:40 AM       18:       Labs at [facility]:  22: CA 19-9 = 390  22: CEA = 12.3  22: CEA = 19.2  22: CEA = 17.9   22: CEA = 6.0    Signatera MRD:  22: 295.97 MTM/mL  22: 2.98  22: 0.88     Imagin/9/18 Abd/pelvis CT: IMPRESSION:  1. Interval development of a large retroperitoneal mass encircling the aorta with invasion of the left renal hilum and left adrenal gland. Several adjacent lymph nodes are seen extending into the peritoneum and underneath the  diaphragmatic natalie. This most likely represents lymphoma. 2. Several new bilateral enlarged inguinal lymph nodes also likely representing lymphoma. 3. Moderate left hydronephrosis with a delayed renal nephrogram related to decreased renal function. This is related to the invasion of the renal hilum. 18 Chest CT: IMPRESSION:  Trace left pleural effusion.  Bilateral lower lobe atelectasis. Large  retroperitoneal mass lesion again demonstrated. PET 12/18/18:  FINDINGS:  HEAD/NECK: Right palatine tonsil intense hypermetabolism, max SUV 18. Multilevel  bilateral cervical adenopathy, with max SUV 12 in a left supraclavicular node. Cerebral evaluation is limited by normal intense activity. CHEST: Solitary hypermetabolic left axillary node, max SUV 11. ABDOMEN/PELVIS: Bulky retroperitoneal mass max SUV 27, with several additional  small active abdomino-pelvic nodes. Bilateral inguinal nodes with max SUV 12 on  the left. SKELETON: No foci of abnormal hypermetabolism in the axial and visualized  appendicular skeleton. IMPRESSION:   1. Right palatine tonsil tumor involvement (Deauville 5). 2. Bilateral cervical delaney involvement (Deauville 5). 3. Left axillary node involvement (Deauville 5). 4. Bulky retroperitoneal lymphoma mass and additional smaller hypermetabolic  abdomino-pelvic nodes (Deauville 5). 5. Bilateral inguinal delaney involvement (Deauville 5). Deauville Five Point Scale  1. No uptake or no residual uptake (when used interim)  2. Slight uptake, but below blood pool (mediastinum)  3. Uptake above mediastinal, but below/equal to uptake in the liver  4. Uptake slightly to moderately higher than liver  5. Markedly increased uptake or any new lesion (on response evaluation)  Each FDG-avid (or previously FDG avid) lesion is rated independently. Reference values:  Mediastinal blood pool: 2.1 SUV  Liver (background): 2.2 SUV    PET/CT 2/05/19:   IMPRESSION:  1. No Foci of Abnormal Hypermetabolism (Deauville 1). 2. Resolved activity in the right palatine tonsil, bilateral cervical nodes,left axillary node, retroperitoneal/abdominal pelvic adenopathy, bilateral inguinal nodes. Echo 2/14/19:  Normal cavity size, wall thickness and systolic function (ejection fraction normal).  The muscle mass is normal. The cavity shape is normal. The estimated ejection fraction is 41 - 45%. Abnormal wall motion as described on the wall scoring diagram below. End-systolic volume is normal. Normal left ventricular strain. There is mild (grade 1) left ventricular diastolic dysfunction. Normal left ventricular diastolic pressure. End-diastolic volume is normal.    LE arterial duplex 2/22/19:  There is evidence of left groin pseudoaneurysm noted arising from distal common femoral artery, pseudo lobe measures 2.32cm x 2.58cm and pseudo neck length measuring 0.63cm. There is no evidence of hemodynamically significant left lower extremity arterial obstruction. JACLYN is 1.03 on the right and 1.02 on the left. LE arterial duplex s/p Thrombin Injection to Pseudoaneurysm 2/26/19:  Successful thrombin injection procedure of the left groin with no further flow seen. No evidence of hemodnyamically significant obstruction in the left lower extremity. Left lower extremity arterial duplex performed. Confirmed pseudoaneurysm in left groin with small neck. Following thrombin injection, no further flow seen in the pseudoaneurysm. The left common femoral, profunda femoral, femoral, popliteal, posterior tibial and anterior arteries were imaged. Mainly triphasic flow was seen with no evidence of significantly elevated velocities. Repeat LE arterial duplex 2/27/19:  Continued thrombosed left groin pseudoaneurysm following thrombin injection on 02/26/2019. No flow or color fill is identified. The hematoma measures approximately 2.1 x 2.9 cm in diameter. The common femoral, deep femoral, femoral, and popliteal arteries are patent with mainly tri-phasic flow and no significant hemodynamically obstruction is noted. Stress 5/31/19:  Normal stress myocardial perfusion without ischemia or infact at 84% MPHR. Normal LV function. LVEF 60%. No EKG changes of ischemia at peak exercise. Normal functional capacity. PET 6/03/19: IMPRESSION: No Foci of Abnormal Hypermetabolism (Deauville 1).     -MRI lumbar spine 12/18/19:  Mild disc degenerative change at L3-4 and L4-5. Mild canal stenosis at L3-4 and mild left foraminal stenosis at L4-5. Other less severe degenerative findings are as described above. Continued diminished size of retroperitoneal mass-adenopathy,  with diminished soft tissue density at the left renal hilum. -CT chest/abdomen 12/16/19:  Findings are consistent with interval response to therapy    CT c/a/p 5/28/20: IMPRESSION:  Further contraction of the retroperitoneal mantle and chris mesentery,  compatible with treatment response  Stable left hilar soft tissue mass  Slight increased splaying of the duodenum and proximal jejunum is the result, without obstruction  No evidence for recurrence of lymphoma in the chest, abdomen, or pelvis    CT c/a/p 2/13/22:  FINDINGS:   LOWER THORAX: Dependent atelectasis with otherwise clear lungs. The visualized  heart is normal in size without pericardial effusion. LIVER: No mass. BILIARY TREE: Gallbladder is unremarkable. CBD is not dilated. SPLEEN: Unremarkable. PANCREAS: No mass or ductal dilatation. ADRENALS: Unremarkable. KIDNEYS: Atrophic left kidney with mild left hydronephrosis. Right kidney is  unremarkable with no stone, enhancing mass, or other renal abnormality. STOMACH: Unremarkable. SMALL BOWEL: No dilatation or wall thickening. COLON: Circumferential wall thickening at the hepatic flexure with luminal  narrowing, adjacent mesenteric stranding, and fluid with upstream retention in  the cecum and fecalization of distal ileal contents. No dilation or other wall  thickening. APPENDIX: Unremarkable. PERITONEUM: Moderate free fluid with no pneumoperitoneum. RETROPERITONEUM: Soft tissue stranding around the celiac and SMA and associated aorta with no discrete mass or adenopathy. Aorta is normal in size without aneurysm or dissection. REPRODUCTIVE ORGANS: Prostate and seminal vesicles appear unremarkable.   URINARY BLADDER: No mass or calculus. BONES: No destructive bone lesion. ABDOMINAL WALL: No mass or hernia. ADDITIONAL COMMENTS: N/A  IMPRESSION  1. Annular mass lesion of the right colon with upstream fecal retention  concerning for primary colon neoplasm versus less likely lymphoma. 2. Soft tissue stranding around celiac axis, SMA, and abdominal aorta may  reflect infiltrative lymphoma. 3. Left renal atrophy with left hydronephrosis likely reflecting chronic  proximal ureteral obstruction. 4. Incidentals as above. 2/24/22 CT abd/pelvis at [facility]:  FINDINGS: An enteric tube with sidehole beyond the level of the lower esophageal sphincter is seen looping on itself in the proximal stomach before terminating in the mid stomach. Status post right lower quadrant diverting ileostomy for an obstructing colonic mass. Multiple loops of gas dilated small bowel remain, with a maximal diameter of 5.4 cm whereas previously measured 3.6 cm. Dilute contrast is seen within the lateral left abdominal dilated small bowel. Moderate stool burden and bowel gas opacifies the cecum, measuring 7.3 cm in diameter. No definite supine evidence of pneumoperitoneum. Lung bases: Limited evaluation of the lung bases demonstrates a cardiac silhouette within normal limits for size. A small bore central venous catheter is seen terminating in the right atrium. No focal consolidation or pleural effusion. 2/24/22 CT abd/pelvis [facility]:  IMPRESSION:  1. Status post right lower quadrant loop ileostomy. Mild diffuse dilation of small bowel proximal to the ostomy in the lower abdomen and upper pelvis concerning for at least mild to moderate partial bowel obstruction. Relatively decompressed loop ileostomy. 2. Mildly complex pelvic fluid collection in the rectovesical space, decreased in size from prior. 3. Redemonstrated ascending colon mass and findings suspicious for regional metastatic disease.   4. Redemonstrated soft tissue in the retroperitoneum with prominent lymph nodes, similar to prior examination. Differential would include metastasis, retroperitoneal fibrosis. 5. Mild atherosclerosis. 6. Subcentimeter right renal hypodensity, too small to characterize. 7. Severe compression of the left renal vein, similar to prior examination. 8. Additional findings as described above. Bones: No acute osseous abnormality. 2/24/22 CT chest [facility]:  IMPRESSION:  FINAL report. 1. No evidence of metastatic disease to the chest.  2. No enlarged lymph nodes. 3. Increasing volume loss in the lung bases which may be due to atelectasis. 4. CT abdomen and pelvis reported separately. 2/25/22 Colonoscopy at [facility]:  Impression:            - Preparation of the colon was inadequate. - Stool in the entire examined colon. - Likely malignant completely obstructing tumor at the                         hepatic flexure. Biopsied.                        - Malignant-appearing tumor in the colon. Complications:         No immediate complications. 7/19/22 CT ch/abd/pelv:  IMPRESSION  Response to treatment characterized by decrease in size of the apical core  lesion in the proximal transverse colon and decreased mucosal colic  lymphadenopathy. Persistent mesenteric lymphadenopathy and retroperitoneal/mesenteric soft tissue  which may relate to the patient's history of lymphoma. Chronic left renal atrophy with chronic left hydronephrosis. RECIST:  Target lesions:  Transverse colon mass, series 2, image 75, 27 x 26 mm, previously 49 x 45 mm. Nontarget lesions:  Transverse mesocolic lymph nodes, decreased. 10/10/22 CT ch/abd/pelv:  IMPRESSION  Increase in peritoneal disease compared to the prior examination. Stable  mesenteric infiltrate. Improvement in the mass lesion involving the transverse  colon. RECIST:  Target lesion:  Transverse colon mass, series 2, image 76, 1.7 x 1.3 cm, previously 2.7 x 2.6cm.   Nontarget lesions:   Transverse mesial colic lymph nodes unchanged. Assessment & Plan:   Mandy Pastor. is a 41 y.o. male comes in for evaluation and management of lymphoma. 1. Undifferentiated carcinoma of transverse colon, metastatic, KRAS/NRAS status unclear:  S/p diverting ileostomy due to large bowel obstruction. Colonoscopy with biopsy on 2/25/22. No obvious metastatic disease on CT imaging, but did have obvious metastatic disease to peritoneum during laparoscopy with Dr. Supriya Olivas. I recommended FOLFOXIRI every 2 weeks. Will not give Bevacizumab given h/o MI and tobacco use. [employer] suggests: BRCA2 and YVONNE mutations support use of Olaparib or similar PARP inhibitor in future. They also suggest testing for TMB, MSI, PDL1 to determine utility of checkpoint inhibitors. CT after C9 showed good response with decrease in size of colon mass on 10/10/22. Supportive medications: zofran, compazine, dexamethasone, EMLA topical  -- Proceed with C11 FOLFOXIRI. Dose-reducing Irinotecan by 10% to avoid cytopenias/treatment delays. -- Repeat Signatera testing showed continued decline. Repeat due today 10/24/2022. Looking into adding [employer] or alternate NGS testing to determine TMB, MSI, PDL1, KRAS/NRAS/BRAF. -- Check CEA every 8 weeks  -- Consider repeat colonoscopy in 4-6 months given incomplete colonoscopy. -- Can cut Dex down to 4mg on D2,3 given increased anxiety those days. -- CT after every 4 cycles, next due after C13  -- Return in 2 weeks for C12 of FOLFOXIRI     2. H/o Follicular lymphoma:   Grade 3a with bulky disease encircling the aorta, causing pain. Bone marrow negative. BR better than RCHOP, but based on GALLIUM study, Obinutuzumab-based induction and maintenance prolongs PFS over that seen with rituximab-based therapy. Therefore, pt started on O-CHOP regimen. Pt achieved CR after C2, but was switched to BR x 4 cycles given STEMI. Completed treatment 5/2019. End of treatment PET 6/3/19 showed CR.  No extra surveillance needed at this time given metastatic colon cancer with frequent imaging. Current imaging shows slight increase in soft tissue density in deep pelvis on 10/2022. Will continue to monitor for colon cancer follow up imaging. 3. Chronic lumbar back pain / anxiety / RLQ:   Left lower back pain is chronic/stable and RLQ is likely related to neoplasm/colostomy - currently managing pain on Oxycontin 10mg prn, and Lexapro daily. Following with Dr. Justine Leal. 4. CAD / HTN:   h/o STEMI 2/14/29 with TOM placed to proximal LAD. Following with Dr. Crista Lamb and remains on dual antiplatelet therapy, high dose Lipitor, Metoprolol, ACEI. Received only 2 doses of cardiotoxic chemo, Doxorubicin in early 1/2019. Is overdue for follow up with Dr. Crista Lamb. 5. Tobacco abuse:   Previously discussed smoking cessation strategies and benefits. Pt has tried quitting in the past and is not interested. 6. BRCA2 mutation:  Pt would benefit from genetic counseling for himself and his family. 7. Chemotherapy induced neutropenia:  Intermittent. Neulasta added with C3-4, but now on hold due to insurance denial.   -- Consider adding Ziextendo if persistent neutropenia. 8. Chemotherapy induced thrombocytopenia:   Monitor for bleeding. Goals of care: Disease is not curable, but is treatable to improve quality and duration of life. I personally provided the service today.      Signed By: Gatito Farrar MD

## 2022-11-09 ENCOUNTER — HOSPITAL ENCOUNTER (OUTPATIENT)
Dept: INFUSION THERAPY | Age: 47
Discharge: HOME | End: 2022-11-09
Payer: MEDICAID

## 2022-11-09 VITALS
TEMPERATURE: 98.7 F | SYSTOLIC BLOOD PRESSURE: 127 MMHG | DIASTOLIC BLOOD PRESSURE: 83 MMHG | HEART RATE: 72 BPM | OXYGEN SATURATION: 100 % | RESPIRATION RATE: 16 BRPM

## 2022-11-09 DIAGNOSIS — C82.90 FOLLICULAR LYMPHOMA, UNSPECIFIED FOLLICULAR LYMPHOMA TYPE, UNSPECIFIED BODY REGION (HCC): ICD-10-CM

## 2022-11-09 DIAGNOSIS — C18.9 COLON ADENOCARCINOMA (HCC): ICD-10-CM

## 2022-11-09 DIAGNOSIS — D70.1 CHEMOTHERAPY INDUCED NEUTROPENIA (HCC): Primary | ICD-10-CM

## 2022-11-09 DIAGNOSIS — T45.1X5A CHEMOTHERAPY INDUCED NEUTROPENIA (HCC): Primary | ICD-10-CM

## 2022-11-09 DIAGNOSIS — Z76.89 PREVENTION OF CHEMOTHERAPY-INDUCED NEUTROPENIA: ICD-10-CM

## 2022-11-09 PROCEDURE — 74011000250 HC RX REV CODE- 250: Performed by: NURSE PRACTITIONER

## 2022-11-09 PROCEDURE — 96523 IRRIG DRUG DELIVERY DEVICE: CPT

## 2022-11-09 PROCEDURE — 74011250636 HC RX REV CODE- 250/636: Performed by: NURSE PRACTITIONER

## 2022-11-09 RX ORDER — HEPARIN 100 UNIT/ML
300-500 SYRINGE INTRAVENOUS AS NEEDED
Status: DISCONTINUED | OUTPATIENT
Start: 2022-11-09 | End: 2022-11-10 | Stop reason: HOSPADM

## 2022-11-09 RX ORDER — SODIUM CHLORIDE 9 MG/ML
10 INJECTION INTRAMUSCULAR; INTRAVENOUS; SUBCUTANEOUS AS NEEDED
Status: DISCONTINUED | OUTPATIENT
Start: 2022-11-09 | End: 2022-11-10 | Stop reason: HOSPADM

## 2022-11-09 RX ORDER — SODIUM CHLORIDE 0.9 % (FLUSH) 0.9 %
10 SYRINGE (ML) INJECTION AS NEEDED
Status: DISCONTINUED | OUTPATIENT
Start: 2022-11-09 | End: 2022-11-10 | Stop reason: HOSPADM

## 2022-11-09 RX ADMIN — Medication 10 ML: at 14:55

## 2022-11-09 RX ADMIN — HEPARIN 500 UNITS: 100 SYRINGE at 14:55

## 2022-11-09 NOTE — PROGRESS NOTES
[facility] Progress Note    Date: 2022    Name: Yamil Bran. MRN: [id number]         : 1975:            Mr. Cristiana Islas arrived ambulatory and in no distress for Pump Removal.  Assessment was completed, no acute issues at this time, no new complaints voiced. CADD completed in infusion center- 100 ml infused per order. Mr. Renato Arechiga vitals were reviewed. Visit Vitals  /83 (BP 1 Location: Left upper arm, BP Patient Position: Sitting)   Pulse 72   Temp 98.7 °F (37.1 °C)   Resp 16   SpO2 100%       Mr. Cristiana Islas tolerated treatment well and was discharged from [place] [address] in stable condition at 1500. Port de-accessed, flushed & heparinized per protocol. He is to return on  at 0730 for his next appointment.     Sharda Crawford RN  2022

## 2022-11-14 ENCOUNTER — TELEPHONE (OUTPATIENT)
Dept: PALLATIVE CARE | Age: 47
End: 2022-11-14

## 2022-11-14 ENCOUNTER — APPOINTMENT (OUTPATIENT)
Dept: INFUSION THERAPY | Age: 47
End: 2022-11-14
Payer: MEDICAID

## 2022-11-14 DIAGNOSIS — C18.9 COLON ADENOCARCINOMA (HCC): ICD-10-CM

## 2022-11-14 DIAGNOSIS — R10.84 ABDOMINAL PAIN, GENERALIZED: ICD-10-CM

## 2022-11-14 DIAGNOSIS — C78.6 PERITONEAL CARCINOMATOSIS (HCC): ICD-10-CM

## 2022-11-14 RX ORDER — ATROPINE SULFATE 0.4 MG/ML
0.4 INJECTION, SOLUTION ENDOTRACHEAL; INTRAMEDULLARY; INTRAMUSCULAR; INTRAVENOUS; SUBCUTANEOUS
Status: CANCELLED | OUTPATIENT
Start: 2022-11-28

## 2022-11-14 RX ORDER — DEXTROSE MONOHYDRATE 50 MG/ML
25 INJECTION, SOLUTION INTRAVENOUS CONTINUOUS
Status: CANCELLED | OUTPATIENT
Start: 2022-11-28

## 2022-11-14 RX ORDER — SODIUM CHLORIDE 0.9 % (FLUSH) 0.9 %
10 SYRINGE (ML) INJECTION AS NEEDED
Status: CANCELLED | OUTPATIENT
Start: 2022-11-30

## 2022-11-14 RX ORDER — DIPHENHYDRAMINE HYDROCHLORIDE 50 MG/ML
50 INJECTION, SOLUTION INTRAMUSCULAR; INTRAVENOUS AS NEEDED
Status: CANCELLED
Start: 2022-11-28

## 2022-11-14 RX ORDER — HYDROCORTISONE SODIUM SUCCINATE 100 MG/2ML
100 INJECTION, POWDER, FOR SOLUTION INTRAMUSCULAR; INTRAVENOUS AS NEEDED
Status: CANCELLED | OUTPATIENT
Start: 2022-11-28

## 2022-11-14 RX ORDER — ACETAMINOPHEN 325 MG/1
650 TABLET ORAL AS NEEDED
Status: CANCELLED
Start: 2022-11-28

## 2022-11-14 RX ORDER — SODIUM CHLORIDE 9 MG/ML
10 INJECTION INTRAMUSCULAR; INTRAVENOUS; SUBCUTANEOUS AS NEEDED
Status: CANCELLED | OUTPATIENT
Start: 2022-11-30

## 2022-11-14 RX ORDER — ONDANSETRON 2 MG/ML
8 INJECTION INTRAMUSCULAR; INTRAVENOUS AS NEEDED
Status: CANCELLED | OUTPATIENT
Start: 2022-11-28

## 2022-11-14 RX ORDER — SODIUM CHLORIDE 0.9 % (FLUSH) 0.9 %
10 SYRINGE (ML) INJECTION AS NEEDED
Status: CANCELLED | OUTPATIENT
Start: 2022-11-28

## 2022-11-14 RX ORDER — ALBUTEROL SULFATE 0.83 MG/ML
2.5 SOLUTION RESPIRATORY (INHALATION) AS NEEDED
Status: CANCELLED
Start: 2022-11-28

## 2022-11-14 RX ORDER — HEPARIN 100 UNIT/ML
300-500 SYRINGE INTRAVENOUS AS NEEDED
Status: CANCELLED | OUTPATIENT
Start: 2022-11-28

## 2022-11-14 RX ORDER — OXYCODONE HYDROCHLORIDE 10 MG/1
10 TABLET ORAL
Qty: 90 TABLET | Refills: 0 | Status: SHIPPED | OUTPATIENT
Start: 2022-11-19 | End: 2022-11-28 | Stop reason: SDUPTHER

## 2022-11-14 RX ORDER — DIPHENHYDRAMINE HYDROCHLORIDE 50 MG/ML
25 INJECTION, SOLUTION INTRAMUSCULAR; INTRAVENOUS AS NEEDED
Status: CANCELLED
Start: 2022-11-28

## 2022-11-14 RX ORDER — SODIUM CHLORIDE 9 MG/ML
10 INJECTION INTRAMUSCULAR; INTRAVENOUS; SUBCUTANEOUS AS NEEDED
Status: CANCELLED | OUTPATIENT
Start: 2022-11-28

## 2022-11-14 RX ORDER — EPINEPHRINE 1 MG/ML
0.3 INJECTION, SOLUTION, CONCENTRATE INTRAVENOUS AS NEEDED
Status: CANCELLED | OUTPATIENT
Start: 2022-11-28

## 2022-11-14 RX ORDER — HEPARIN 100 UNIT/ML
300-500 SYRINGE INTRAVENOUS AS NEEDED
Status: CANCELLED | OUTPATIENT
Start: 2022-11-30

## 2022-11-14 RX ORDER — SODIUM CHLORIDE 9 MG/ML
25 INJECTION, SOLUTION INTRAVENOUS CONTINUOUS
Status: CANCELLED | OUTPATIENT
Start: 2022-11-28

## 2022-11-14 RX ORDER — PALONOSETRON 0.05 MG/ML
0.25 INJECTION, SOLUTION INTRAVENOUS ONCE
Status: CANCELLED | OUTPATIENT
Start: 2022-11-28 | End: 2022-11-21

## 2022-11-14 NOTE — TELEPHONE ENCOUNTER
Triage for Controlled Substance Refill Request     Pain Diagnosis: _Colon adenocarcinoma ([facility] [address] (C18.9); Abdominal pain, generalized (R10.84); Peritoneal carcinomatosis ([facility] [address] (C78.6)     Last Outpatient Visit: _10/11/2022     Next Outpatient Visit: _11/21/2022     Reason for refill needed outside of office visit? Appointment not scheduled prior to need for scheduled refill        Pharmacy: _[facility]/pharmacy #[id number]      Medication:oxyCODONE IR (ROXICODONE) 10 mg tab immediate release tablet     Dose and directions: Take 1 Tablet by mouth every four (4) hours as needed for Pain for up to 15 days.   Number dispensed:90  Date filled ( or Pharmacy) 11/4/2022  # left:0         reviewed: _yes     Date of Urine Drug Screen:  _8/22/2022     Opioid Safety Handout given:  _yes     Appropriate for refill:  _yes     Action:  _ Medication pending

## 2022-11-14 NOTE — TELEPHONE ENCOUNTER
Pt asked for 10 mg oxycodone to be refilled at the [facility] on [address], stated he has 32 pills remaining.

## 2022-11-16 ENCOUNTER — APPOINTMENT (OUTPATIENT)
Dept: INFUSION THERAPY | Age: 47
End: 2022-11-16
Payer: MEDICAID

## 2022-11-18 ENCOUNTER — TELEPHONE (OUTPATIENT)
Dept: PALLATIVE CARE | Age: 47
End: 2022-11-18

## 2022-11-18 NOTE — TELEPHONE ENCOUNTER
Called patient to advise/confirm upcoming appt with Dr. Joanne Choi on 11/21/22  at 9:30 at [facility]. Got voicemail. Left message. Also advised to please bring in your 's License and Insurance Card and any pain medications in the original container with you to appointment.

## 2022-11-21 ENCOUNTER — HOSPITAL ENCOUNTER (OUTPATIENT)
Dept: INFUSION THERAPY | Age: 47
End: 2022-11-21

## 2022-11-22 NOTE — PROGRESS NOTES
[id number] [facility] at [address] [place] [address]  [phone]    Hematology / Oncology Established Visit    Reason for Visit:   Kayden Reddy is a 41 y.o. male who is seen for urgent follow up of colon cancer, h/o lymphoma. Hematology Oncology Treatment History:     Diagnosis #1: Follicular lymphoma  Stage: IV  Pathology:   11/13/18 right inguinal LN excision: Follicular lymphoma, high-grade (grade 3a of 3). Prior Treatment:   1. Obinutuzumab-CHOP. Obinutuzumab: 1000 mg weekly on days 1, 8, 15 for cycle 1, then 1000 mg on day 1 q21 days for cycles 2-6, then monotherapy 1000 mg every 21 days for cycle 7, 8 with Cyclophosphamide 750mg/m2, Doxorubicin 50mg/m2, Vincristine 1.4mg/m2 on day 1 and Prednisone 100mg on Days 1-5, every 21 days for a total of 2 cycles completed late 1/2019. Regimen discontinued due to STEMI. 2. Obinutuzumab + Bendamustine: 1000 mg Obinutuzumab on day 1 + Bendamustine 90mg/m2 on days 1-2 on a 28-day cycle x 4 cycles, completed 5/2019  Current Treatment: Surveillance      Diagnosis #2: Colon cancer:  Stage: IV  Pathology:  Ascending colon; biopsy: poorly differentiated carcinoma  Comment: No dysplasia is identified. Immunohistochemical stains performed on block 1A, show the tumor positive for Cam5.2 and shows patchy positivity for CDX2 with a Ki-67 index of -90%; the tumor is focally positive for CK5/6 and GATA3; negative for p63, Pax8, CK7, CK20, panmelanoma, synaptophysin and chromogranin. The immunophenotypic features are nonspecific but argues somewhat against the following carcinoma: urothelial, neuroendocrine and breast. The immunophenotypic features also argues against melanoma and somewhat against classic colorectal carcinoma. Additional immunohistochemical stains to exclude the possibility of prostate and hepatocellular carcinoma have been   ordered; the results will be reported as an addendum.   -MLH1/MSH2/MSH6/PMS2 all intact with no loss of nuclear expression of MMR proteins - no MSI   Addendum: The addendum is issued to report the results of additional immunohistochemical stains in an attempt to ascertain the primary site of the carcinoma. The diagnosis is unchanged. Hepar and glypican 3 immunohistochemical stains are neg, arguing against hepatocellular carcinoma. PSA stain is negative, arguing against prostatic primary. Imaging studies to eval for poss primary sites maybe useful. In the absence of carcinoma/tumor at any other sites, this may represent an undifferentiated carcinoma of the colon.  -Oncogenomics (molecular) studies: POLE< ARID1A, YVONNE, ATR, BRCA2, CHECK1, FANCI, NF1, PIK3CA, PTCH1, PTEN, RAD50, RAD51, RB1, SMARCA4, STK11    Prior Treatment:   1. Loop ileostomy 2/19/22  2. Diagnostic laparoscopy with peritoneal biopsy, 4/27/22    Current Treatment: FOLFOXIRI every 2 weeks until disease progression or toxicity, 5/16/22 - current      Oncologic History:  Was in his usual state of health in early November 2018 when he developed low back pain and presented to the ER. Work-up at outside hospital revealed a retroperitoneal mass seen on CT imaging, and he was transferred to [facility] for further work-up. CT there showed a large retroperitoneal mass encircling the aorta with invasion of the left renal hilum and left adrenal gland. There were bilateral inguinal lymph nodes and moderate left hydronephrosis. He was evaluated while at [facility] and was noted to have palpable nodes in his groin for approximately the past 1 month. He underwent excisional LN biopsy of right inguinal LN, which revealed follicular lymphoma. At time of diagnosis, he had no cardiac disease aside from HTN, hyperlipidemia. However, he did have an NSTEMI after cycle 2 of O-CHOP. Was likely unrelated to chemotherapy, but opted to switch treatment to Obinutuzumab-Bendamustine. He completed treatment in 5/2019 and had a CR based on PET.     History of Present Illness:   Mr. Garima Bennett is a 41 y.o. male with is seen for follow up of colon cancer and C12 of treatment. Reports overall he feels well aside from pain around colostomy site and low back. On OxyContin 10mg q12h and oxycodone 10mg every 4 hours, which moderately manages the pain. Soft stool output. No fevers, chills, SOB, CP or neuropathy. PMHx: Lymphoma in 2018, CAD, HTN, Hyperlipidemia, Anxiety, chronic low back pain  PSurgHx: None aside from cardiac stent placement and port placement  SHx: Smokes 1/2ppd x past 20 yrs. Works part time as a cook. Has 2 children. FHx: Father had leukemia, passed away from pancreatic cancer. Review of Systems: A complete review of systems was obtained, negative except as described above. Physical Exam:     Visit Vitals  /81   Temp 97.5 °F (36.4 °C)   Ht 5' 7\" (1.702 m)   Wt 145 lb (65.8 kg)   BMI 22.71 kg/m²       ECOG PS: 0  General: no distress  Eyes: anicteric sclerae  HENT: oropharynx clear  Neck: supple  Lymphatic: no cervical, supraclavicular adenopathy  Respiratory: CTAB, normal respiratory effort  CV: no peripheral edema, port upper chest   GI: soft, nontender, nondistended, no masses;  colostomy in place - abdomen is distended around site of colostomy in RLQ. Skin: no rashes; no ecchymoses; no petechiae      Results:     Lab Results   Component Value Date/Time    WBC 3.5 (L) 11/28/2022 07:52 AM    HGB 11.1 (L) 11/28/2022 07:52 AM    HCT 33.7 (L) 11/28/2022 07:52 AM    PLATELET 883 (L) 95/93/5502 07:52 AM    .8 (H) 11/28/2022 07:52 AM    ABS.  NEUTROPHILS 1.5 (L) 11/28/2022 07:52 AM    Hemoglobin (POC) 15.0 06/05/2009 02:13 PM    Hematocrit (POC) 39 02/14/2019 01:24 PM     Lab Results   Component Value Date/Time    Sodium 140 11/28/2022 07:52 AM    Potassium 3.4 (L) 11/28/2022 07:52 AM    Chloride 109 (H) 11/28/2022 07:52 AM    CO2 25 11/28/2022 07:52 AM    Glucose 120 (H) 11/28/2022 07:52 AM    BUN 8 11/28/2022 07:52 AM    Creatinine 0.98 11/28/2022 07:52 AM    GFR est AA >60 10/03/2022 07:50 AM    GFR est non-AA >60 10/03/2022 07:50 AM    Calcium 9.4 2022 07:52 AM    Sodium (POC) 136 2019 01:24 PM    Potassium (POC) 3.9 2019 01:24 PM    Chloride (POC) 102 2019 01:24 PM    Glucose (POC) 249 (H) 02/15/2019 10:21 PM    BUN (POC) 14 2019 01:24 PM    Creatinine (POC) 0.9 2019 01:24 PM    Calcium, ionized (POC) 1.24 2019 01:24 PM     Lab Results   Component Value Date/Time    Bilirubin, total 0.3 2022 07:42 AM    ALT (SGPT) 47 2022 07:42 AM    Alk. phosphatase 366 (H) 2022 07:42 AM    Protein, total 6.6 2022 07:42 AM    Albumin 3.0 (L) 2022 07:42 AM    Globulin 3.6 2022 07:42 AM     Lab Results   Component Value Date/Time    Iron 18 (L) 2022 11:13 AM    TIBC 173 (L) 2022 11:13 AM    Iron % saturation 10 (L) 2022 11:13 AM    Ferritin 426 (H) 2022 11:13 AM       No results found for: B12LT, FOL, RBCF  Lab Results   Component Value Date/Time    TSH 1.53 2016 04:40 AM       18:       Labs at [facility]:  22: CA 19-9 = 390  22: CEA = 12.3  22: CEA = 19.2  22: CEA = 17.9   22: CEA = 6.0    Signatera MRD:  22: 295.97 MTM/mL  22: 2.98  22: 0.88   10/24/22: 37.87    Imagin/9/18 Abd/pelvis CT: IMPRESSION:  1. Interval development of a large retroperitoneal mass encircling the aorta with invasion of the left renal hilum and left adrenal gland. Several adjacent lymph nodes are seen extending into the peritoneum and underneath the  diaphragmatic natalie. This most likely represents lymphoma. 2. Several new bilateral enlarged inguinal lymph nodes also likely representing lymphoma. 3. Moderate left hydronephrosis with a delayed renal nephrogram related to decreased renal function. This is related to the invasion of the renal hilum. 18 Chest CT: IMPRESSION:  Trace left pleural effusion. Bilateral lower lobe atelectasis. Large  retroperitoneal mass lesion again demonstrated. PET 12/18/18:  FINDINGS:  HEAD/NECK: Right palatine tonsil intense hypermetabolism, max SUV 18. Multilevel  bilateral cervical adenopathy, with max SUV 12 in a left supraclavicular node. Cerebral evaluation is limited by normal intense activity. CHEST: Solitary hypermetabolic left axillary node, max SUV 11. ABDOMEN/PELVIS: Bulky retroperitoneal mass max SUV 27, with several additional  small active abdomino-pelvic nodes. Bilateral inguinal nodes with max SUV 12 on  the left. SKELETON: No foci of abnormal hypermetabolism in the axial and visualized  appendicular skeleton. IMPRESSION:   1. Right palatine tonsil tumor involvement (Deauville 5). 2. Bilateral cervical delaney involvement (Deauville 5). 3. Left axillary node involvement (Deauville 5). 4. Bulky retroperitoneal lymphoma mass and additional smaller hypermetabolic  abdomino-pelvic nodes (Deauville 5). 5. Bilateral inguinal delaney involvement (Deauville 5). Deauville Five Point Scale  1. No uptake or no residual uptake (when used interim)  2. Slight uptake, but below blood pool (mediastinum)  3. Uptake above mediastinal, but below/equal to uptake in the liver  4. Uptake slightly to moderately higher than liver  5. Markedly increased uptake or any new lesion (on response evaluation)  Each FDG-avid (or previously FDG avid) lesion is rated independently. Reference values:  Mediastinal blood pool: 2.1 SUV  Liver (background): 2.2 SUV    PET/CT 2/05/19:   IMPRESSION:  1. No Foci of Abnormal Hypermetabolism (Deauville 1). 2. Resolved activity in the right palatine tonsil, bilateral cervical nodes,left axillary node, retroperitoneal/abdominal pelvic adenopathy, bilateral inguinal nodes. Echo 2/14/19:  Normal cavity size, wall thickness and systolic function (ejection fraction normal). The muscle mass is normal. The cavity shape is normal. The estimated ejection fraction is 41 - 45%.  Abnormal wall motion as described on the wall scoring diagram below. End-systolic volume is normal. Normal left ventricular strain. There is mild (grade 1) left ventricular diastolic dysfunction. Normal left ventricular diastolic pressure. End-diastolic volume is normal.    LE arterial duplex 2/22/19:  There is evidence of left groin pseudoaneurysm noted arising from distal common femoral artery, pseudo lobe measures 2.32cm x 2.58cm and pseudo neck length measuring 0.63cm. There is no evidence of hemodynamically significant left lower extremity arterial obstruction. JACLYN is 1.03 on the right and 1.02 on the left. LE arterial duplex s/p Thrombin Injection to Pseudoaneurysm 2/26/19:  Successful thrombin injection procedure of the left groin with no further flow seen. No evidence of hemodnyamically significant obstruction in the left lower extremity. Left lower extremity arterial duplex performed. Confirmed pseudoaneurysm in left groin with small neck. Following thrombin injection, no further flow seen in the pseudoaneurysm. The left common femoral, profunda femoral, femoral, popliteal, posterior tibial and anterior arteries were imaged. Mainly triphasic flow was seen with no evidence of significantly elevated velocities. Repeat LE arterial duplex 2/27/19:  Continued thrombosed left groin pseudoaneurysm following thrombin injection on 02/26/2019. No flow or color fill is identified. The hematoma measures approximately 2.1 x 2.9 cm in diameter. The common femoral, deep femoral, femoral, and popliteal arteries are patent with mainly tri-phasic flow and no significant hemodynamically obstruction is noted. Stress 5/31/19:  Normal stress myocardial perfusion without ischemia or infact at 84% MPHR. Normal LV function. LVEF 60%. No EKG changes of ischemia at peak exercise. Normal functional capacity. PET 6/03/19: IMPRESSION: No Foci of Abnormal Hypermetabolism (Deauville 1).     -MRI lumbar spine 12/18/19:  Mild disc degenerative change at L3-4 and L4-5. Mild canal stenosis at L3-4 and mild left foraminal stenosis at L4-5. Other less severe degenerative findings are as described above. Continued diminished size of retroperitoneal mass-adenopathy,  with diminished soft tissue density at the left renal hilum. -CT chest/abdomen 12/16/19:  Findings are consistent with interval response to therapy    CT c/a/p 5/28/20: IMPRESSION:  Further contraction of the retroperitoneal mantle and chris mesentery,  compatible with treatment response  Stable left hilar soft tissue mass  Slight increased splaying of the duodenum and proximal jejunum is the result, without obstruction  No evidence for recurrence of lymphoma in the chest, abdomen, or pelvis    CT c/a/p 2/13/22:  FINDINGS:   LOWER THORAX: Dependent atelectasis with otherwise clear lungs. The visualized  heart is normal in size without pericardial effusion. LIVER: No mass. BILIARY TREE: Gallbladder is unremarkable. CBD is not dilated. SPLEEN: Unremarkable. PANCREAS: No mass or ductal dilatation. ADRENALS: Unremarkable. KIDNEYS: Atrophic left kidney with mild left hydronephrosis. Right kidney is  unremarkable with no stone, enhancing mass, or other renal abnormality. STOMACH: Unremarkable. SMALL BOWEL: No dilatation or wall thickening. COLON: Circumferential wall thickening at the hepatic flexure with luminal  narrowing, adjacent mesenteric stranding, and fluid with upstream retention in  the cecum and fecalization of distal ileal contents. No dilation or other wall  thickening. APPENDIX: Unremarkable. PERITONEUM: Moderate free fluid with no pneumoperitoneum. RETROPERITONEUM: Soft tissue stranding around the celiac and SMA and associated aorta with no discrete mass or adenopathy. Aorta is normal in size without aneurysm or dissection. REPRODUCTIVE ORGANS: Prostate and seminal vesicles appear unremarkable. URINARY BLADDER: No mass or calculus.   BONES: No destructive bone lesion. ABDOMINAL WALL: No mass or hernia. ADDITIONAL COMMENTS: N/A  IMPRESSION  1. Annular mass lesion of the right colon with upstream fecal retention  concerning for primary colon neoplasm versus less likely lymphoma. 2. Soft tissue stranding around celiac axis, SMA, and abdominal aorta may  reflect infiltrative lymphoma. 3. Left renal atrophy with left hydronephrosis likely reflecting chronic  proximal ureteral obstruction. 4. Incidentals as above. 2/24/22 CT abd/pelvis at [facility]:  FINDINGS: An enteric tube with sidehole beyond the level of the lower esophageal sphincter is seen looping on itself in the proximal stomach before terminating in the mid stomach. Status post right lower quadrant diverting ileostomy for an obstructing colonic mass. Multiple loops of gas dilated small bowel remain, with a maximal diameter of 5.4 cm whereas previously measured 3.6 cm. Dilute contrast is seen within the lateral left abdominal dilated small bowel. Moderate stool burden and bowel gas opacifies the cecum, measuring 7.3 cm in diameter. No definite supine evidence of pneumoperitoneum. Lung bases: Limited evaluation of the lung bases demonstrates a cardiac silhouette within normal limits for size. A small bore central venous catheter is seen terminating in the right atrium. No focal consolidation or pleural effusion. 2/24/22 CT abd/pelvis [facility]:  IMPRESSION:  1. Status post right lower quadrant loop ileostomy. Mild diffuse dilation of small bowel proximal to the ostomy in the lower abdomen and upper pelvis concerning for at least mild to moderate partial bowel obstruction. Relatively decompressed loop ileostomy. 2. Mildly complex pelvic fluid collection in the rectovesical space, decreased in size from prior. 3. Redemonstrated ascending colon mass and findings suspicious for regional metastatic disease.   4. Redemonstrated soft tissue in the retroperitoneum with prominent lymph nodes, similar to prior examination. Differential would include metastasis, retroperitoneal fibrosis. 5. Mild atherosclerosis. 6. Subcentimeter right renal hypodensity, too small to characterize. 7. Severe compression of the left renal vein, similar to prior examination. 8. Additional findings as described above. Bones: No acute osseous abnormality. 2/24/22 CT chest [facility]:  IMPRESSION:  FINAL report. 1. No evidence of metastatic disease to the chest.  2. No enlarged lymph nodes. 3. Increasing volume loss in the lung bases which may be due to atelectasis. 4. CT abdomen and pelvis reported separately. 2/25/22 Colonoscopy at [facility]:  Impression:            - Preparation of the colon was inadequate. - Stool in the entire examined colon. - Likely malignant completely obstructing tumor at the                         hepatic flexure. Biopsied.                        - Malignant-appearing tumor in the colon. Complications:         No immediate complications. 7/19/22 CT ch/abd/pelv:  IMPRESSION  Response to treatment characterized by decrease in size of the apical core  lesion in the proximal transverse colon and decreased mucosal colic  lymphadenopathy. Persistent mesenteric lymphadenopathy and retroperitoneal/mesenteric soft tissue  which may relate to the patient's history of lymphoma. Chronic left renal atrophy with chronic left hydronephrosis. RECIST:  Target lesions:  Transverse colon mass, series 2, image 75, 27 x 26 mm, previously 49 x 45 mm. Nontarget lesions:  Transverse mesocolic lymph nodes, decreased. 10/10/22 CT ch/abd/pelv:  IMPRESSION  Increase in peritoneal disease compared to the prior examination. Stable  mesenteric infiltrate. Improvement in the mass lesion involving the transverse  colon. RECIST:  Target lesion:  Transverse colon mass, series 2, image 76, 1.7 x 1.3 cm, previously 2.7 x 2.6cm.   Nontarget lesions:   Transverse mesial colic lymph nodes unchanged. Assessment & Plan:   Osbaldo Webb. is a 41 y.o. male comes in for evaluation and management of lymphoma. 1. Undifferentiated carcinoma of transverse colon, metastatic, KRAS/NRAS status unclear:  S/p diverting ileostomy due to large bowel obstruction. Colonoscopy with biopsy on 2/25/22. No obvious metastatic disease on CT imaging, but did have obvious metastatic disease to peritoneum during laparoscopy with Dr. Charity Barrientos. I recommended FOLFOXIRI every 2 weeks. Will not give Bevacizumab given h/o MI and tobacco use. [employer] suggests: BRCA2 and YVONNE mutations support use of Olaparib or similar PARP inhibitor in future. They also suggest testing for TMB, PDL1 to determine utility of checkpoint inhibitors. CT after C9 showed good response with decrease in size of colon mass on 10/10/22. Supportive medications: zofran, compazine, dexamethasone, EMLA topical  -- Proceed with C12 FOLFOXIRI. Dose-reducing Irinotecan by 10% to avoid cytopenias/treatment delays. -- Repeat Signatera due late 12/2022. Looking into adding [employer] or alternate NGS testing to determine TMB, MSI, PDL1, KRAS/NRAS/BRAF. -- Check CEA every 8 weeks  -- Consider repeat colonoscopy in 4-6 months given incomplete colonoscopy. -- Can cut Dex down to 4mg on D2,3 given increased anxiety those days and dex premed decreased 8mg.   -- CT after every 4 cycles, next due after C13  -- Return in 2 weeks for C13 of FOLFOXIRI. -- 40 meq KCL in [facility] today. 2. H/o Follicular lymphoma:   Grade 3a with bulky disease encircling the aorta, causing pain. Bone marrow negative. BR better than RCHOP, but based on GALLIUM study, Obinutuzumab-based induction and maintenance prolongs PFS over that seen with rituximab-based therapy. Therefore, pt started on O-CHOP regimen. Pt achieved CR after C2, but was switched to BR x 4 cycles given STEMI. Completed treatment 5/2019. End of treatment PET 6/3/19 showed CR.  No extra surveillance needed at this time given metastatic colon cancer with frequent imaging. Current imaging shows slight increase in soft tissue density in deep pelvis on 10/2022. Will continue to monitor for colon cancer follow up imaging. 3. Chronic lumbar back pain / anxiety / RLQ:   Left lower back pain is chronic/stable and RLQ is likely related to neoplasm/colostomy/parastomal hernia - currently managing pain on Oxycontin 10mg q12h, oxycodone 10mg q4h prn. Following with Dr. Cornel Kenney. -- Consult Dr. Elier Moore regarding parastomal hernia. 4. CAD / HTN:   h/o STEMI 2/14/19 with TOM placed to proximal LAD. Following with Dr. Minal Carr and remains on dual antiplatelet therapy, high dose Lipitor, Metoprolol, ACEI. Received only 2 doses of cardiotoxic chemo, Doxorubicin in early 1/2019. Is overdue for follow up with Dr. Minal Carr.   -- Not currently taking Metoprolol or ACEI. Referred back to Dr. Minal Carr since he is overdue for follow up. 5. Tobacco abuse:   Previously discussed smoking cessation strategies and benefits. Pt has tried quitting in the past and is not interested. 6. BRCA2 mutation:  Pt would benefit from genetic counseling for himself and his family. 7. Chemotherapy induced neutropenia:  Intermittent. Neulasta added with C3-4, but now on hold due to insurance denial.   -- Consider adding Ziextendo if persistent neutropenia. 8. Chemotherapy induced thrombocytopenia:   Monitor for bleeding. Goals of care: Disease is not curable, but is treatable to improve quality and duration of life. I personally saw and evaluated the patient and performed the key components of medical decision making. The history, physical exam, and documentation were performed by Rell Bearden NP. I reviewed and verified the above documentation and modified it as needed. Proceed with C12 FOLFOXIRI which pt is tolerating well.  He does have chronic pain surrounding his ostomy as well as physical exam and imaging evidence of parastomal hernia. Have asked pt to follow up with Dr. Brian Barnes in [place] to see if any intervention is recommended.      Signed By: Pop Rojas MD

## 2022-11-23 ENCOUNTER — HOSPITAL ENCOUNTER (OUTPATIENT)
Dept: INFUSION THERAPY | Age: 47
End: 2022-11-23
Payer: MEDICAID

## 2022-11-26 NOTE — PROGRESS NOTES
Palliative Medicine Outpatient Services  [place]: [phone] ([phone])    Patient Name: Ashley Hickman YOB: 1975     Date of Current Visit: 11/28/22   Location of Current Visit:    [] [facility] Office  [] [facility] Office  [] [address] [place] [address]  [] Home  [x] Other:  televisit    Date of Initial Visit: 2/19/19   Referral from: Indy Jane MD  Primary Care Physician: Manolo Ritchie MD      SUMMARY:   Ashley Hickman is a 41y.o. year old with high-grade follicular lymphoma, who was referred to Palliative Medicine by Dr. Isrrael Cam for symptom management and supportive care. He was diagnosed in 11/2018 after presenting with back pain, treated with systemic chemotherapy with complete response. He suffered cardiac arrest during cycle #3 due to previously undiagnosed severe stenosis of the LAD s/p PTCA and stent. He was diagnosed with poorly differentiated carcinoma of the colon (no evidence of metastatic disease) in 2/14/22 and underwent loop ileostomy at [address] [place] [address] on 2/19/22. He underwent exploratory laparoscopy in 4/2022 which revealed a large tumor at the hepatic flexure peritoneal carcinomatosis. He is currently being treated with systemic chemotherapy under the care of Dr. Jose Rivero. The patients social history includes: he is single. He lives in [place] with his girlfriend, Jaylen Teresa, and their 12-month old son. Gavin Francis He has 3 teenage children who live with their mother and with whom he has close contact. 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 Palliative Medicine is addressing the following current patient/family concerns: abdominal pain related to malignancy; anxiety, depression; left mid- and low back pain, poor appetite, fatigue, advanced care planning. Initial Referral Intake note from Naa Jones RN reviewed prior to visit   PALLIATIVE DIAGNOSES:       ICD-10-CM ICD-9-CM    1.  Abdominal pain, generalized  R10.84 789.07 oxyCODONE IR (ROXICODONE) 10 mg tab immediate release tablet      oxyCODONE ER (OxyCONTIN) 20 mg ER tablet      2. Right arm pain  M79.601 729.5       3. Chronic left-sided low back pain without sciatica  M54.50 724.2 oxyCODONE ER (OxyCONTIN) 20 mg ER tablet    G89.29 338.29       4. Anxiety  F41.9 300.00       5. Reactive depression  F32.9 300.4       6. Poor appetite  R63.0 783.0       7. Other fatigue  R53.83 780.79       8. Nausea without vomiting  R11.0 787.02       9. Colon adenocarcinoma (HCC)  C18.9 153.9 oxyCODONE IR (ROXICODONE) 10 mg tab immediate release tablet      oxyCODONE ER (OxyCONTIN) 20 mg ER tablet      10. Peritoneal carcinomatosis (HCC)  C78.6 197.6 oxyCODONE IR (ROXICODONE) 10 mg tab immediate release tablet      oxyCODONE ER (OxyCONTIN) 20 mg ER tablet      11. Cancer associated pain  G89.3 338.3 DISCONTINUED: oxyCODONE ER (OxyCONTIN) 10 mg ER tablet             PLAN:     Patient Instructions   Dear Ward cMmillan. ,    It was a pleasure seeing you today for an office visit. We will see you again in 4 weeks for an office visit to coordinate with your infusion. If labs or imaging tests have been ordered for you today, please call the office  at [phone] 48 hours after completion to obtain the results. Your described symptoms were: Fatigue: 8 Drowsiness: 7   Depression: 2 Pain: 7   Anxiety: 10 Nausea: 2   Anorexia: 2 Dyspnea: 0   Best Well-Bein Constipation: No           This is the plan we talked about:    1. Abdominal pain  -You continue to have pain around your ostomy site  -Continue oxycodone 10-mg every 4 hours as needed  -Increase extended-release oxycodone to 20-mg every 12 hours  -Today Dr. Debra Ervin referred you to Dr. Dank Cid for your parastomal hernia    2.  Right arm pain  -You continue to experience right shoulder and arm pain that radiates from your axilla down your right side  -This occurs only after chemotherapy and usually resolves within a day, though it lasted a week after your last infusion  -You have no associated weakness or numbness  -CT 10/10/22 revealed correct placement of your port and showed no masses in your chest  -This may be related to positioning during your infusion  -You anticipate getting scans again next month    3. Low back pain  -Chronic since lymphoma diagnosis with escalation of pain after colon cancer diagnosis  -Continue oxycodone as above    4. Depression/anxiety  -You have ongoing anxiety which is chronic and unchanged  -You've met with our clinical , Dominique Guadarrama, in the past  -Continue Lexapro 20-mg daily  -I'm avoiding benzodiazepines due to synergistic effect with opioids    5. Poor appetite/weight loss  -Your appetite is good now  -You're maintaining your weight    6. Fatigue  -This is chronic and unchanged   -You're sleeping about 5 hours at night, except on nights when you take dexamethasone    7. Nausea  -Mild and associated with chemotherapy  -Continue ondansetron 8-mg every 8 hours as needed  -Continue prochlorperazine 10-mg every 6 hours as needed    8. Colon mass  -You're receiving chemotherapy under the care of Dr. Katerine Boyd  -Your scans on 10/10/22 showed mixed response  -You anticipate repeat scans in December    This is what you have shared with us about Advance Care Planning:      Primary Decision Maker: Raad Wilson - Girlfriend - [phone]  Advance Care Planning 11/7/2022   Patient's [address] is: Legal Next [facility] Directive None   Patient Would Like to Complete Advance Directive No   Does the patient have other document types -           The Palliative Medicine Team is here to support you and your family.        Sincerely,      Jesika Schuster MD and the Palliative Medicine [facility]     GOALS OF CARE / TREATMENT PREFERENCES:   [====Goals of Care====]  GOALS OF CARE:  Patient / health care proxy stated goals: See Patient Instructions / Summary    TREATMENT PREFERENCES:   Code Status:  [x] Attempt Resuscitation       [] Do Not Attempt Resuscitation    Advance Care Planning:  [x] The Pall Med Interdisciplinary Team has updated the ACP Navigator with Decision Maker and Patient Capacity      Primary Decision Maker: Dolores Billy - Girlfriend - [phone]    [] Named in a scanned document   [x] Legal Next of Kin  [] Guardian    Other:  (If patient appropriate for POST, consider using PALLPOST smart phrase here)    The palliative care team has discussed with patient / health care proxy about goals of care / treatment preferences for patient.  [====Goals of Care====]     PRESCRIPTIONS GIVEN:     Medications Ordered Today   Medications    oxyCODONE IR (ROXICODONE) 10 mg tab immediate release tablet     Sig: Take 1 Tablet by mouth every four (4) hours as needed for Pain for up to 15 days. Max Daily Amount: 60 mg. Dispense:  90 Tablet     Refill:  0    DISCONTD: oxyCODONE ER (OxyCONTIN) 10 mg ER tablet     Sig: Take 1 Tablet by mouth two (2) times a day for 30 days. Max Daily Amount: 20 mg. Dispense:  60 Tablet     Refill:  0    oxyCODONE ER (OxyCONTIN) 20 mg ER tablet     Sig: Take 1 Tablet by mouth every twelve (12) hours for 30 days. Max Daily Amount: 40 mg.      Dispense:  60 Tablet     Refill:  0                 FOLLOW UP:     Future Appointments   Date Time Provider Dahlia Epps   11/30/2022  3:00 PM [facility]   12/12/2022  7:30 AM [facility]   12/12/2022  8:45 AM Ashlie BROOKS MD [facility]   12/14/2022  3:00 [facility]   12/27/2022  7:30 AM [facility]   12/29/2022  3:00 PM SS INF5 CH4 <1H [facility]           PHYSICIANS INVOLVED IN CARE:   Patient Care Team:  Larayne Blizzard, MD as PCP - General (Family Medicine)  Albino Sage MD (Hematology and Oncology)  Jose Hoskins MD as Physician (Palliative Medicine)  Jose Hoskins MD as Physician (Palliative Medicine) HISTORY:   Reviewed patient-completed ESAS and advance care planning form. Reviewed patient record in prescription monitoring program.    CHIEF COMPLAINT:   Chief Complaint   Patient presents with    Abdominal Pain    Back Pain               HPI/SUBJECTIVE:    The patient is: [x] Verbal / [] Nonverbal     He feels his pain is under better control after starting extended-release oxycodone. He's having less pain when he wakes up. He sleeps throughout the night, waking up once or twice to go to the bathroom. He's taking immediate-release oxycodone very 4 to 5 hours during the day. He sometimes takes an oxycodone when he wakes up at night but not always. See Plan/Patient Instructions for additional interval history      From visit 3/2/22:  He started having abdominal pain about a month ago. Then he started feeling nauseous. He had trouble with bowel movements, feeling like he wasn't completing emptying his bowels. He went to the ED on 2/14, CT scan showed mass in his colon. He was hospitalized at [facility] 2/15-2/25 for colonoscopy and loop ileostomy. He's still having pain in his abdomen. He's been taking 2 oxycodone every 4 to 6 hours which eases the pain to ~5/10 which is tolerable. He's always anxious. He continues to take Lexapro. He's eating, not as much as he used to. He ate 2 PBJs yesterday. He had mild nausea for a few days after he came home. He's passing formed stool in his ostomy bag daily. Home health is coming to his home. He sees Dr. Ag Naylor next week. From IV 2/19/19:  He has a lot of anxiety. He's lived with anxiety for a long time, sometimes it's been worse than others, like when he lost his job and lost his insurance. He took Wellbutrin then which made him more anxious and depressed. He's had more anxiety since he was diagnosed with lymphoma. He's been taking lorazepam and it doesn't help at all, even when he tried taking 2 pills. This is his biggest problem now.  He feels depressed but it's not as bad as the anxiety. He has pain in his back, that's what brought him to the hospital in November. He's been taking oxycodone which helps some. The groin pain started after his operation (cardiac cath) in the hospital last week. He expects that will get better as it heals. His pain doesn't bother him too much, not like the anxiety. His appetite is getting better. He's lost ~30# since last fall but that's leveled off now. He's moving his bowels regularly. He sometimes gets short of breath but not as much as used to. He doesn't have chest pain, never did. He sleeps well at night. He doesn't have the energy to do much during the day. He lives with his father. He sees his three teen-age children frequently (they live with their mother). His children give him a lot of strength.         Clinical Pain Assessment (nonverbal scale for nonverbal patients):   [++++ Clinical Pain Assessment++++]  [++++Pain Severity++++]: Pain: 7  [++++Pain Character++++]: stabbing pain in back  [++++Pain Duration++++]: months for back pain, weeks for groin pain  [++++Pain Effect++++]: little  [++++Pain Factors++++]: oxycodone helps with back pain, groin pain elicited by standing and walking  [++++Pain Frequency++++]: constant back pain with varying intensity  [++++Pain Location++++]: left lower back  [++++ Clinical Pain Assessment++++]    [++++ Clinical Pain Assessment++++]  [++++Pain Severity++++]: Pain: 7  [++++Pain Character++++]: deep, aching  [++++Pain Duration++++]: since 2/2022  [++++Pain Effect++++]: limits function, emotional distress  [++++Pain Factors++++]: unable to identify provoking factors  [++++Pain Frequency++++]: constant  [++++Pain Location++++]: right lower abdomen  [++++ Clinical Pain Assessment++++]         FUNCTIONAL ASSESSMENT:     Palliative Performance Scale (PPS):          PSYCHOSOCIAL/SPIRITUAL SCREENING:     Any spiritual / Restorationist concerns:  [] Yes /  [x] No    Caregiver Burnout:  [] Yes /  [x] No /  [] No Caregiver Present      Anticipatory grief assessment:   [x] Normal  / [] Maladaptive       ESAS Anxiety: Anxiety: 10    ESAS Depression: Depression: 2       REVIEW OF SYSTEMS:     The following systems were [x] reviewed / [] unable to be reviewed  Systems: constitutional, ears/nose/mouth/throat, respiratory, gastrointestinal, genitourinary, musculoskeletal, integumentary, neurologic, psychiatric, endocrine. Positive findings noted below. Modified ESAS Completed by: provider   Fatigue: 8 Drowsiness: 7   Depression: 2 Pain: 7   Anxiety: 10 Nausea: 2   Anorexia: 2 Dyspnea: 0   Best Well-Bein Constipation: No              PHYSICAL EXAM:     Wt Readings from Last 3 Encounters:   22 145 lb (65.8 kg)   22 145 lb (65.8 kg)   22 145 lb 3.2 oz (65.9 kg)     Blood pressure 125/81, pulse 76, temperature 97.5 °F (36.4 °C), temperature source Tympanic, resp. rate 16, weight 145 lb (65.8 kg), SpO2 100 %.   Last bowel movement: See Nursing Note      Constitutional: sitting in recliner, ill-appearing  Eyes: pupils equal, anicteric  ENMT: no nasal discharge, moist mucous membranes  Cardiovascular: regular rhythm, no peripheral edema  Respiratory: breathing not labored, symmetric  Gastrointestinal: soft non-tender, +bowel sounds  Musculoskeletal: no deformity, no tenderness to palpation  Skin: warm, dry  Neurologic: following commands, moving all extremities  Psychiatric: full affect, no hallucinations  Other:            HISTORY:     Past Medical History:   Diagnosis Date    Anxiety     Anxiety and depression     [facility]     lymphoma 2018 receiving chemo    Cancer ([facility] 2022    COLON    Chronic pain     lower back- lymphoma    Hyperlipidemia     Hypertension     NO MEDICATION FOR PAST 9 MONTHS    Lymphadenopathy 2018    Seizures ([facility] CHILDHOOD    NEVER TOOK MEDICATION - \"GREW OUT OF THEM\"      Past Surgical History:   Procedure Laterality Date HX COLONOSCOPY      HX HEART CATHETERIZATION  02/2019    HX ILEOSTOMY      HX OTHER SURGICAL  02/2019    cardiac stent    IR INJECTION PSEUDOANEURYSM  2/26/2019    CO ABDOMEN SURGERY PROC UNLISTED  02/2022    COLON RESECTION WITH ILEOSTOMY    CO CARDIAC SURG PROCEDURE UNLIST  2019    STENT X1      Family History   Problem Relation Age of Onset    Hypertension Father     Diabetes Father     Cancer Father         PROSTATE    Diabetes Mother     No Known Problems Brother     No Known Problems Brother     Anesth Problems Neg Hx       History reviewed, no pertinent family history. Social History     Tobacco Use    Smoking status: Every Day     Packs/day: 0.50     Years: 20.00     Pack years: 10.00     Types: Cigarettes    Smokeless tobacco: Never    Tobacco comments:     \"STOP SMOKING\" PACKET GIVEN TO PATIENT   Substance Use Topics    Alcohol use: No     No Known Allergies   Current Outpatient Medications   Medication Sig    [START ON 12/4/2022] oxyCODONE IR (ROXICODONE) 10 mg tab immediate release tablet Take 1 Tablet by mouth every four (4) hours as needed for Pain for up to 15 days. Max Daily Amount: 60 mg.    oxyCODONE ER (OxyCONTIN) 20 mg ER tablet Take 1 Tablet by mouth every twelve (12) hours for 30 days. Max Daily Amount: 40 mg.    ondansetron hcl (ZOFRAN) 8 mg tablet Take 1 Tablet by mouth every eight (8) hours as needed for Nausea or Vomiting. dexAMETHasone (DECADRON) 4 mg tablet Take 8mg (2 x tabs) on days 2 and 3 after treatment to prevent nausea. Take in the AM with food. multivitamin (ONE A DAY) tablet Take 1 Tablet by mouth daily. atorvastatin (LIPITOR) 40 mg tablet TAKE 1 TAB BY MOUTH NIGHTLY. INDICATIONS: TREATMENT TO SLOW PROGRESSION OF CORONARY ARTERY DISEASE    prochlorperazine (Compazine) 10 mg tablet Take 1 Tablet by mouth every six (6) hours as needed for Nausea or Vomiting.  (Patient not taking: Reported on 11/28/2022)    lidocaine-prilocaine (EMLA) topical cream Apply a dime size amount to port site 30 minutes before treatment to prevent pain. (Patient not taking: No sig reported)     No current facility-administered medications for this visit. Facility-Administered Medications Ordered in Other Visits   Medication Dose Route Frequency    0.9% sodium chloride infusion  25 mL/hr IntraVENous CONTINUOUS    dextrose 5% infusion  25 mL/hr IntraVENous CONTINUOUS    oxaliplatin (ELOXATIN) 144.5 mg in dextrose 5% 250 mL, overfill volume 25 mL chemo infusion  85 mg/m2 (Order-Specific) IntraVENous ONCE    leucovorin (WELLCOVORIN) 680 mg in dextrose 5% 250 mL, overfill volume 25 mL IVPB  400 mg/m2 (Order-Specific) IntraVENous ONCE    fluorouraciL (ADRUCIL) 4,080 mg in 0.9% sodium chloride 100 mL CADD Cassette  2,400 mg/m2 (Order-Specific) IntraVENous ONCE          LAB DATA REVIEWED:     Lab Results   Component Value Date/Time    WBC 3.5 (L) 11/28/2022 07:52 AM    HGB 11.1 (L) 11/28/2022 07:52 AM    PLATELET 061 (L) 82/72/4798 07:52 AM     Lab Results   Component Value Date/Time    Sodium 140 11/28/2022 07:52 AM    Potassium 3.4 (L) 11/28/2022 07:52 AM    Chloride 109 (H) 11/28/2022 07:52 AM    CO2 25 11/28/2022 07:52 AM    BUN 8 11/28/2022 07:52 AM    Creatinine 0.98 11/28/2022 07:52 AM    Calcium 9.4 11/28/2022 07:52 AM    Magnesium 1.7 01/12/2019 04:05 AM    Phosphorus 2.0 (L) 01/12/2019 04:05 AM      Lab Results   Component Value Date/Time    Alk.  phosphatase 348 (H) 11/28/2022 07:52 AM    Protein, total 6.7 11/28/2022 07:52 AM    Albumin 3.0 (L) 11/28/2022 07:52 AM    Globulin 3.7 11/28/2022 07:52 AM     Lab Results   Component Value Date/Time    INR 1.1 02/22/2019 08:18 PM    Prothrombin time 10.8 02/22/2019 08:18 PM    aPTT 27.8 02/22/2019 08:18 PM      Lab Results   Component Value Date/Time    Iron 18 (L) 05/06/2022 11:13 AM    TIBC 173 (L) 05/06/2022 11:13 AM    Iron % saturation 10 (L) 05/06/2022 11:13 AM    Ferritin 426 (H) 05/06/2022 11:13 AM          MRI lumbar spine 12/18/19:  Congenital narrowing of the lumbar canal. Vertebral body heights are maintained. Marrow signal is normal.     The conus medullaris terminates at T12/L1. Signal and caliber of the distal  spinal cord are within normal limits. There is no pathologic intrathecal  enhancement. The paraspinal soft tissues are within normal limits. Lower thoracic spine: No herniation or stenosis. L1-L2: No herniation or stenosis. L2-L3: No herniation or stenosis. L3-L4: Mild facet arthropathy. Minimal central disc protrusion. Mild canal  stenosis. Foramina are patent  L4-L5: Desiccation. Mild facet arthropathy. Canal demonstrates minimal stenosis. There is an annular ligament tear far laterally on the left. Mild left foraminal  stenosis. L5-S1: Mild facet arthropathy. Canal and foramina are patent. IMPRESSION:  Mild disc degenerative change at L3-4 and L4-5. Mild canal stenosis at L3-4 and mild left foraminal stenosis at L4-5. Other less severe degenerative findings are as described above. CT chest/abdomen 12/16/19:  THYROID: No nodule. MEDIASTINUM: No mass or lymphadenopathy. Port catheter in place on the right. Catheter tip in appropriate position. SB: No mass or lymphadenopathy. THORACIC AORTA: No dissection or aneurysm. MAIN PULMONARY ARTERY: Unremarkable  TRACHEA/BRONCHI: Unremarkable  ESOPHAGUS: No wall thickening or dilatation. HEART: Normal in size. PLEURA: No effusion or pneumothorax. LUNGS: Bibasilar atelectasis is noted. LIVER: No mass or biliary dilatation. GALLBLADDER: Layering contrast versus cholelithiasis. SPLEEN: No mass. PANCREAS: No mass or ductal dilatation. ADRENALS: The left adrenal gland is elevated by the retroperitoneal mass. The    right is unremarkable. KIDNEYS: There is diminished soft tissue density at the level of the left renal  hilum. There is increased left renal cortical atrophy. STOMACH: Unremarkable.     SMALL BOWEL: No dilatation or wall thickening. COLON: No dilatation or wall thickening. APPENDIX: Unremarkable. PERITONEUM: No ascites or pneumoperitoneum. RETROPERITONEUM:   Diminished size of retroperitoneal mass. Diminished in size with margins difficult to fully ascertain. 2-62 35 x 50 mm previously 71 x 94 mm.     2-67 left periaortic adenopathy 25 x 17 mm  The SMA, celiac, and LIZZY are remain encased, diminished attenuation of these  vessels. REPRODUCTIVE ORGANS: The prostate and seminal vesicles are unremarkable. URINARY  BLADDER: Unremarkable  BONES: No destructive bone lesion. ADDITIONAL COMMENTS: Resolved left inguinal pseudoaneurysm. Anais Red IMPRESSION:    Baseline research examination. Findings are consistent with interval response to therapy. Continued diminished size of retroperitoneal mass-adenopathy,  with diminished soft tissue density at the left renal hilum. CT chest/abdomen/pelvis 2/14/22:  1. Annular mass lesion of the right colon with upstream fecal retention  concerning for primary colon neoplasm versus less likely lymphoma. 2. Soft tissue stranding around celiac axis, SMA, and abdominal aorta may  reflect infiltrative lymphoma. 3. Left renal atrophy with left hydronephrosis likely reflecting chronic  proximal ureteral obstruction. 4. Incidentals as above. CT chest/abdomen/pelvis 7/19/22:  Response to treatment characterized by decrease in size of the apical core  lesion in the proximal transverse colon and decreased mucosal colic  lymphadenopathy. Persistent mesenteric lymphadenopathy and retroperitoneal/mesenteric soft tissue  which may relate to the patient's history of lymphoma. Chronic left renal atrophy with chronic left hydronephrosis. CT chest/abdomen/pelvis 10/10/22: Increase in peritoneal disease compared to the prior examination. Stable  mesenteric infiltrate. Improvement in the mass lesion involving the transverse  colon.       CONTROLLED SUBSTANCES SAFETY ASSESSMENT (IF ON CONTROLLED SUBSTANCES):     Reviewed opioid safety handout:  [x] Yes   [] No  24 hour opioid dose >150mg morphine equivalent/day:  [] Yes   [x] No  Benzodiazepines:  [] Yes   [x] No  Sleep apnea:  [] Yes   [x] No  Urine Toxicology Testing within last 6 months:  [x] Yes   [] No (8/22/22 + as expected for oxycodone/metabolites)  History of or new aberrant medication taking behaviors:  [] Yes   [x] No  Has Narcan been prescribed [x] Yes   [] No          Total time:   Counseling / coordination time:   > 50% counseling / coordination?:

## 2022-11-28 ENCOUNTER — HOSPITAL ENCOUNTER (OUTPATIENT)
Dept: INFUSION THERAPY | Age: 47
Discharge: HOME | End: 2022-11-28
Payer: MEDICAID

## 2022-11-28 ENCOUNTER — APPOINTMENT (OUTPATIENT)
Dept: INFUSION THERAPY | Age: 47
End: 2022-11-28

## 2022-11-28 ENCOUNTER — TELEPHONE (OUTPATIENT)
Dept: PALLATIVE CARE | Age: 47
End: 2022-11-28

## 2022-11-28 ENCOUNTER — OFFICE VISIT (OUTPATIENT)
Dept: PALLATIVE CARE | Age: 47
End: 2022-11-28
Payer: MEDICAID

## 2022-11-28 ENCOUNTER — OFFICE VISIT (OUTPATIENT)
Dept: ONCOLOGY | Age: 47
End: 2022-11-28

## 2022-11-28 VITALS
HEIGHT: 67 IN | BODY MASS INDEX: 22.79 KG/M2 | RESPIRATION RATE: 16 BRPM | WEIGHT: 145.2 LBS | HEART RATE: 78 BPM | DIASTOLIC BLOOD PRESSURE: 83 MMHG | SYSTOLIC BLOOD PRESSURE: 128 MMHG | TEMPERATURE: 97.5 F

## 2022-11-28 VITALS
RESPIRATION RATE: 16 BRPM | BODY MASS INDEX: 22.71 KG/M2 | DIASTOLIC BLOOD PRESSURE: 81 MMHG | OXYGEN SATURATION: 100 % | HEART RATE: 76 BPM | TEMPERATURE: 97.5 F | WEIGHT: 145 LBS | SYSTOLIC BLOOD PRESSURE: 125 MMHG

## 2022-11-28 VITALS
DIASTOLIC BLOOD PRESSURE: 81 MMHG | WEIGHT: 145 LBS | BODY MASS INDEX: 22.76 KG/M2 | SYSTOLIC BLOOD PRESSURE: 125 MMHG | TEMPERATURE: 97.5 F | HEIGHT: 67 IN

## 2022-11-28 DIAGNOSIS — C18.4 CARCINOMA OF TRANSVERSE COLON (HCC): Primary | ICD-10-CM

## 2022-11-28 DIAGNOSIS — F41.9 ANXIETY: ICD-10-CM

## 2022-11-28 DIAGNOSIS — C82.90 FOLLICULAR LYMPHOMA, UNSPECIFIED FOLLICULAR LYMPHOMA TYPE, UNSPECIFIED BODY REGION (HCC): ICD-10-CM

## 2022-11-28 DIAGNOSIS — C18.9 COLON ADENOCARCINOMA (HCC): ICD-10-CM

## 2022-11-28 DIAGNOSIS — D69.59 CHEMOTHERAPY-INDUCED THROMBOCYTOPENIA: ICD-10-CM

## 2022-11-28 DIAGNOSIS — K43.5 PARASTOMAL HERNIA WITHOUT OBSTRUCTION OR GANGRENE: ICD-10-CM

## 2022-11-28 DIAGNOSIS — R11.0 NAUSEA WITHOUT VOMITING: ICD-10-CM

## 2022-11-28 DIAGNOSIS — I25.2 HISTORY OF ST ELEVATION MYOCARDIAL INFARCTION (STEMI): ICD-10-CM

## 2022-11-28 DIAGNOSIS — C78.6 PERITONEAL CARCINOMATOSIS (HCC): ICD-10-CM

## 2022-11-28 DIAGNOSIS — F32.9 REACTIVE DEPRESSION: ICD-10-CM

## 2022-11-28 DIAGNOSIS — R63.0 POOR APPETITE: ICD-10-CM

## 2022-11-28 DIAGNOSIS — T45.1X5A CHEMOTHERAPY INDUCED NEUTROPENIA (HCC): Primary | ICD-10-CM

## 2022-11-28 DIAGNOSIS — T45.1X5A CHEMOTHERAPY-INDUCED THROMBOCYTOPENIA: ICD-10-CM

## 2022-11-28 DIAGNOSIS — R10.84 ABDOMINAL PAIN, GENERALIZED: Primary | ICD-10-CM

## 2022-11-28 DIAGNOSIS — D70.1 CHEMOTHERAPY INDUCED NEUTROPENIA (HCC): Primary | ICD-10-CM

## 2022-11-28 DIAGNOSIS — M54.50 CHRONIC LEFT-SIDED LOW BACK PAIN WITHOUT SCIATICA: ICD-10-CM

## 2022-11-28 DIAGNOSIS — G89.29 CHRONIC LEFT-SIDED LOW BACK PAIN WITHOUT SCIATICA: ICD-10-CM

## 2022-11-28 DIAGNOSIS — Z51.11 CHEMOTHERAPY MANAGEMENT, ENCOUNTER FOR: ICD-10-CM

## 2022-11-28 DIAGNOSIS — Z76.89 PREVENTION OF CHEMOTHERAPY-INDUCED NEUTROPENIA: ICD-10-CM

## 2022-11-28 DIAGNOSIS — M79.601 RIGHT ARM PAIN: ICD-10-CM

## 2022-11-28 DIAGNOSIS — R53.83 OTHER FATIGUE: ICD-10-CM

## 2022-11-28 DIAGNOSIS — R10.31 RLQ ABDOMINAL PAIN: ICD-10-CM

## 2022-11-28 DIAGNOSIS — G89.3 CANCER ASSOCIATED PAIN: ICD-10-CM

## 2022-11-28 LAB
ALBUMIN SERPL-MCNC: 3 G/DL (ref 3.5–5)
ALBUMIN/GLOB SERPL: 0.8 {RATIO} (ref 1.1–2.2)
ALP SERPL-CCNC: 348 U/L (ref 45–117)
ALT SERPL-CCNC: 29 U/L (ref 12–78)
ANION GAP SERPL CALC-SCNC: 6 MMOL/L (ref 5–15)
AST SERPL-CCNC: 30 U/L (ref 15–37)
BASOPHILS # BLD: 0 K/UL (ref 0–0.1)
BASOPHILS NFR BLD: 1 % (ref 0–1)
BILIRUB SERPL-MCNC: 0.4 MG/DL (ref 0.2–1)
BUN SERPL-MCNC: 8 MG/DL (ref 6–20)
BUN/CREAT SERPL: 8 (ref 12–20)
CALCIUM SERPL-MCNC: 9.4 MG/DL (ref 8.5–10.1)
CHLORIDE SERPL-SCNC: 109 MMOL/L (ref 97–108)
CO2 SERPL-SCNC: 25 MMOL/L (ref 21–32)
CREAT SERPL-MCNC: 0.98 MG/DL (ref 0.7–1.3)
DIFFERENTIAL METHOD BLD: ABNORMAL
EOSINOPHIL # BLD: 0.2 K/UL (ref 0–0.4)
EOSINOPHIL NFR BLD: 4 % (ref 0–7)
ERYTHROCYTE [DISTWIDTH] IN BLOOD BY AUTOMATED COUNT: 15.3 % (ref 11.5–14.5)
GLOBULIN SER CALC-MCNC: 3.7 G/DL (ref 2–4)
GLUCOSE SERPL-MCNC: 120 MG/DL (ref 65–100)
HCT VFR BLD AUTO: 33.7 % (ref 36.6–50.3)
HGB BLD-MCNC: 11.1 G/DL (ref 12.1–17)
IMM GRANULOCYTES # BLD AUTO: 0 K/UL (ref 0–0.04)
IMM GRANULOCYTES NFR BLD AUTO: 0 % (ref 0–0.5)
LYMPHOCYTES # BLD: 1.1 K/UL (ref 0.8–3.5)
LYMPHOCYTES NFR BLD: 32 % (ref 12–49)
MCH RBC QN AUTO: 33.5 PG (ref 26–34)
MCHC RBC AUTO-ENTMCNC: 32.9 G/DL (ref 30–36.5)
MCV RBC AUTO: 101.8 FL (ref 80–99)
MONOCYTES # BLD: 0.7 K/UL (ref 0–1)
MONOCYTES NFR BLD: 20 % (ref 5–13)
NEUTS SEG # BLD: 1.5 K/UL (ref 1.8–8)
NEUTS SEG NFR BLD: 43 % (ref 32–75)
NRBC # BLD: 0 K/UL (ref 0–0.01)
NRBC BLD-RTO: 0 PER 100 WBC
PLATELET # BLD AUTO: 120 K/UL (ref 150–400)
PMV BLD AUTO: 10.2 FL (ref 8.9–12.9)
POTASSIUM SERPL-SCNC: 3.4 MMOL/L (ref 3.5–5.1)
PROT SERPL-MCNC: 6.7 G/DL (ref 6.4–8.2)
RBC # BLD AUTO: 3.31 M/UL (ref 4.1–5.7)
SODIUM SERPL-SCNC: 140 MMOL/L (ref 136–145)
WBC # BLD AUTO: 3.5 K/UL (ref 4.1–11.1)

## 2022-11-28 PROCEDURE — 74011000258 HC RX REV CODE- 258: Performed by: INTERNAL MEDICINE

## 2022-11-28 PROCEDURE — 36415 COLL VENOUS BLD VENIPUNCTURE: CPT

## 2022-11-28 PROCEDURE — 74011250636 HC RX REV CODE- 250/636: Performed by: INTERNAL MEDICINE

## 2022-11-28 PROCEDURE — 96417 CHEMO IV INFUS EACH ADDL SEQ: CPT

## 2022-11-28 PROCEDURE — 96415 CHEMO IV INFUSION ADDL HR: CPT

## 2022-11-28 PROCEDURE — 74011250636 HC RX REV CODE- 250/636: Performed by: NURSE PRACTITIONER

## 2022-11-28 PROCEDURE — 96375 TX/PRO/DX INJ NEW DRUG ADDON: CPT

## 2022-11-28 PROCEDURE — 96366 THER/PROPH/DIAG IV INF ADDON: CPT

## 2022-11-28 PROCEDURE — 99214 OFFICE O/P EST MOD 30 MIN: CPT | Performed by: INTERNAL MEDICINE

## 2022-11-28 PROCEDURE — 3078F DIAST BP <80 MM HG: CPT | Performed by: INTERNAL MEDICINE

## 2022-11-28 PROCEDURE — 77030016057 HC NDL HUBR APOL -B

## 2022-11-28 PROCEDURE — 74011250637 HC RX REV CODE- 250/637: Performed by: NURSE PRACTITIONER

## 2022-11-28 PROCEDURE — 96368 THER/DIAG CONCURRENT INF: CPT

## 2022-11-28 PROCEDURE — 80053 COMPREHEN METABOLIC PANEL: CPT

## 2022-11-28 PROCEDURE — 3074F SYST BP LT 130 MM HG: CPT | Performed by: INTERNAL MEDICINE

## 2022-11-28 PROCEDURE — 96416 CHEMO PROLONG INFUSE W/PUMP: CPT

## 2022-11-28 PROCEDURE — 99215 OFFICE O/P EST HI 40 MIN: CPT | Performed by: INTERNAL MEDICINE

## 2022-11-28 PROCEDURE — 96413 CHEMO IV INFUSION 1 HR: CPT

## 2022-11-28 PROCEDURE — 85025 COMPLETE CBC W/AUTO DIFF WBC: CPT

## 2022-11-28 RX ORDER — SODIUM CHLORIDE 9 MG/ML
25 INJECTION, SOLUTION INTRAVENOUS CONTINUOUS
Status: DISCONTINUED | OUTPATIENT
Start: 2022-11-28 | End: 2022-11-30 | Stop reason: HOSPADM

## 2022-11-28 RX ORDER — PALONOSETRON 0.05 MG/ML
0.25 INJECTION, SOLUTION INTRAVENOUS ONCE
Status: COMPLETED | OUTPATIENT
Start: 2022-11-28 | End: 2022-11-28

## 2022-11-28 RX ORDER — OXYCODONE HCL 20 MG/1
20 TABLET, FILM COATED, EXTENDED RELEASE ORAL EVERY 12 HOURS
Qty: 60 TABLET | Refills: 0 | Status: SHIPPED | OUTPATIENT
Start: 2022-11-28 | End: 2022-12-28

## 2022-11-28 RX ORDER — DEXTROSE MONOHYDRATE 50 MG/ML
25 INJECTION, SOLUTION INTRAVENOUS CONTINUOUS
Status: DISPENSED | OUTPATIENT
Start: 2022-11-28 | End: 2022-11-28

## 2022-11-28 RX ORDER — DEXAMETHASONE SODIUM PHOSPHATE 4 MG/ML
8 INJECTION, SOLUTION INTRA-ARTICULAR; INTRALESIONAL; INTRAMUSCULAR; INTRAVENOUS; SOFT TISSUE ONCE
Status: COMPLETED | OUTPATIENT
Start: 2022-11-28 | End: 2022-11-28

## 2022-11-28 RX ORDER — OXYCODONE HYDROCHLORIDE 10 MG/1
10 TABLET ORAL
Qty: 90 TABLET | Refills: 0 | Status: SHIPPED | OUTPATIENT
Start: 2022-12-04 | End: 2022-12-19

## 2022-11-28 RX ORDER — OXYCODONE HCL 10 MG/1
10 TABLET, FILM COATED, EXTENDED RELEASE ORAL 2 TIMES DAILY
Qty: 60 TABLET | Refills: 0 | Status: SHIPPED | OUTPATIENT
Start: 2022-11-29 | End: 2022-11-28 | Stop reason: DRUGHIGH

## 2022-11-28 RX ORDER — POTASSIUM CHLORIDE 750 MG/1
40 TABLET, FILM COATED, EXTENDED RELEASE ORAL
Status: COMPLETED | OUTPATIENT
Start: 2022-11-28 | End: 2022-11-28

## 2022-11-28 RX ADMIN — DEXTROSE MONOHYDRATE 25 ML/HR: 50 INJECTION, SOLUTION INTRAVENOUS at 10:34

## 2022-11-28 RX ADMIN — DEXAMETHASONE SODIUM PHOSPHATE 8 MG: 4 INJECTION INTRA-ARTICULAR; INTRALESIONAL; INTRAMUSCULAR; INTRAVENOUS; SOFT TISSUE at 09:48

## 2022-11-28 RX ADMIN — POTASSIUM CHLORIDE 40 MEQ: 750 TABLET, EXTENDED RELEASE ORAL at 09:48

## 2022-11-28 RX ADMIN — LEUCOVORIN CALCIUM 680 MG: 350 INJECTION, POWDER, LYOPHILIZED, FOR SUSPENSION INTRAMUSCULAR; INTRAVENOUS at 12:26

## 2022-11-28 RX ADMIN — OXALIPLATIN 144.5 MG: 5 INJECTION, SOLUTION, CONCENTRATE INTRAVENOUS at 12:26

## 2022-11-28 RX ADMIN — FLUOROURACIL 4080 MG: 50 INJECTION, SOLUTION INTRAVENOUS at 14:52

## 2022-11-28 RX ADMIN — PALONOSETRON 0.25 MG: 0.05 INJECTION, SOLUTION INTRAVENOUS at 09:48

## 2022-11-28 RX ADMIN — DEXTROSE MONOHYDRATE 252 MG: 5 INJECTION, SOLUTION INTRAVENOUS at 10:40

## 2022-11-28 NOTE — PATIENT INSTRUCTIONS
Dear Brandi Benavides. ,    It was a pleasure seeing you today for an office visit. We will see you again in 4 weeks for an office visit to coordinate with your infusion. If labs or imaging tests have been ordered for you today, please call the office  at [phone] 48 hours after completion to obtain the results. Your described symptoms were: Fatigue: 8 Drowsiness: 7   Depression: 2 Pain: 7   Anxiety: 10 Nausea: 2   Anorexia: 2 Dyspnea: 0   Best Well-Bein Constipation: No           This is the plan we talked about:    1. Abdominal pain  -You continue to have pain around your ostomy site  -Continue oxycodone 10-mg every 4 hours as needed  -Increase extended-release oxycodone to 20-mg every 12 hours  -Today Dr. Yuridia Dillard referred you to Dr. Silverio Pierce for your parastomal hernia    2. Right arm pain  -You continue to experience right shoulder and arm pain that radiates from your axilla down your right side  -This occurs only after chemotherapy and usually resolves within a day, though it lasted a week after your last infusion  -You have no associated weakness or numbness  -CT 10/10/22 revealed correct placement of your port and showed no masses in your chest  -This may be related to positioning during your infusion  -You anticipate getting scans again next month    3. Low back pain  -Chronic since lymphoma diagnosis with escalation of pain after colon cancer diagnosis  -Continue oxycodone as above    4. Depression/anxiety  -You have ongoing anxiety which is chronic and unchanged  -You've met with our clinical , Yvonne Serrato, in the past  -Continue Lexapro 20-mg daily  -I'm avoiding benzodiazepines due to synergistic effect with opioids    5. Poor appetite/weight loss  -Your appetite is good now  -You're maintaining your weight    6. Fatigue  -This is chronic and unchanged   -You're sleeping about 5 hours at night, except on nights when you take dexamethasone    7.  Nausea  -Mild and associated with chemotherapy  -Continue ondansetron 8-mg every 8 hours as needed  -Continue prochlorperazine 10-mg every 6 hours as needed    8. Colon mass  -You're receiving chemotherapy under the care of Dr. Nadira Boswell  -Your scans on 10/10/22 showed mixed response  -You anticipate repeat scans in December    This is what you have shared with us about Advance Care Planning:      Primary Decision Maker: Jos Pritchard - Girlfriend - [phone]  Advance Care Planning 11/7/2022   Patient's [address] is: Legal Next [facility] 296 Directive None   Patient Would Like to Complete Advance Directive No   Does the patient have other document types -           The Palliative Medicine Team is here to support you and your family.        Sincerely,      Ivania Shane MD and the Palliative Medicine [facility]

## 2022-11-28 NOTE — PROGRESS NOTES
[facility] Progress Note    Date: 2022    Name: Mendel Nurse. MRN: [id number]         : 1975    Mr. Matthew Condon Arrived ambulatory and in no distress for C12D1 of Folfoxiri Regimen. Assessment was completed and Port accessed by ADA Licea. Please see [facility] for assessment details. CHG disk applied. Chemotherapy Flowsheet 2022   Cycle C12D1   Date 2022   Drug / Regimen Folfoxiri   Post Hydration -   Pre Meds -   Notes -     8253 Patient proceed to appointment with Dr. Shannan Jaimes. Mr. Deborah White vitals were reviewed. Patient Vitals for the past 12 hrs:   Temp Pulse Resp BP   22 1437 -- 78 16 128/83   22 0742 97.5 °F (36.4 °C) 76 16 125/81     Lab results were obtained and reviewed. Recent Results (from the past 12 hour(s))   CBC WITH AUTOMATED DIFF    Collection Time: 22  7:52 AM   Result Value Ref Range    WBC 3.5 (L) 4.1 - 11.1 K/uL    RBC 3.31 (L) 4.10 - 5.70 M/uL    HGB 11.1 (L) 12.1 - 17.0 g/dL    HCT 33.7 (L) 36.6 - 50.3 %    .8 (H) 80.0 - 99.0 FL    MCH 33.5 26.0 - 34.0 PG    MCHC 32.9 30.0 - 36.5 g/dL    RDW 15.3 (H) 11.5 - 14.5 %    PLATELET 721 (L) 917 - 400 K/uL    MPV 10.2 8.9 - 12.9 FL    NRBC 0.0 0  WBC    ABSOLUTE NRBC 0.00 0.00 - 0.01 K/uL    NEUTROPHILS 43 32 - 75 %    LYMPHOCYTES 32 12 - 49 %    MONOCYTES 20 (H) 5 - 13 %    EOSINOPHILS 4 0 - 7 %    BASOPHILS 1 0 - 1 %    IMMATURE GRANULOCYTES 0 0.0 - 0.5 %    ABS. NEUTROPHILS 1.5 (L) 1.8 - 8.0 K/UL    ABS. LYMPHOCYTES 1.1 0.8 - 3.5 K/UL    ABS. MONOCYTES 0.7 0.0 - 1.0 K/UL    ABS. EOSINOPHILS 0.2 0.0 - 0.4 K/UL    ABS. BASOPHILS 0.0 0.0 - 0.1 K/UL    ABS. IMM.  GRANS. 0.0 0.00 - 0.04 K/UL    DF AUTOMATED     METABOLIC PANEL, COMPREHENSIVE    Collection Time: 22  7:52 AM   Result Value Ref Range    Sodium 140 136 - 145 mmol/L    Potassium 3.4 (L) 3.5 - 5.1 mmol/L    Chloride 109 (H) 97 - 108 mmol/L    CO2 25 21 - 32 mmol/L    Anion gap 6 5 - 15 mmol/L    Glucose 120 (H) 65 - 100 mg/dL    BUN 8 6 - 20 MG/DL    Creatinine 0.98 0.70 - 1.30 MG/DL    BUN/Creatinine ratio 8 (L) 12 - 20      eGFR >60 >60 ml/min/1.73m2    Calcium 9.4 8.5 - 10.1 MG/DL    Bilirubin, total 0.4 0.2 - 1.0 MG/DL    ALT (SGPT) 29 12 - 78 U/L    AST (SGOT) 30 15 - 37 U/L    Alk.  phosphatase 348 (H) 45 - 117 U/L    Protein, total 6.7 6.4 - 8.2 g/dL    Albumin 3.0 (L) 3.5 - 5.0 g/dL    Globulin 3.7 2.0 - 4.0 g/dL    A-G Ratio 0.8 (L) 1.1 - 2.2         Medications:  Medications Administered       dexamethasone (DECADRON) 4 mg/mL injection 8 mg       Admin Date  11/28/2022 Action  Given Dose  8 mg Route  IntraVENous Administered By  Gaby Nicolas RN              dextrose 5% infusion       Admin Date  11/28/2022 Action  New Bag Dose  25 mL/hr Rate  25 mL/hr Route  IntraVENous Administered By  Gaby Nicolas RN              fluorouraciL (ADRUCIL) 4,080 mg in 0.9% sodium chloride 100 mL CADD Cassette       Admin Date  11/28/2022 Action  New Bag Dose  4,080 mg Rate  2.1 mL/hr Route  IntraVENous Administered By  Gaby Nicolas RN              irinotecan (CAMPTOSAR) 252 mg in dextrose 5% 250 mL, overfill volume 25 mL chemo infusion       Admin Date  11/28/2022 Action  New Bag Dose  252 mg Rate  191.7 mL/hr Route  IntraVENous Administered By  Gaby Nicolas RN              leucovorin (WELLCOVORIN) 680 mg in dextrose 5% 250 mL, overfill volume 25 mL IVPB       Admin Date  11/28/2022 Action  New Bag Dose  680 mg Rate  154.5 mL/hr Route  IntraVENous Administered By  Gaby Nicolas RN              oxaliplatin (ELOXATIN) 144.5 mg in dextrose 5% 250 mL, overfill volume 25 mL chemo infusion       Admin Date  11/28/2022 Action  New Bag Dose  144.5 mg Rate  152 mL/hr Route  IntraVENous Administered By  Gaby Nicolas RN              palonosetron HCl (ALOXI) injection 0.25 mg       Admin Date  11/28/2022 Action  Given Dose  0.25 mg Route  IntraVENous Administered By  Gaby Nicolas RN              potassium chloride SR (KLOR-CON 10) tablet 40 mEq       Admin Date  11/28/2022 Action  Given Dose  40 mEq Route  Oral Administered By  Leonardo Cavazos RN                    Two nurses verified prior to administering: Drug name, drug dose, infusion volume or drug volume when prepared in a syringe, rate of administration, route of administration,  expiration dates and/or times, appearance and physical integrity of the drugs, rate set on infusion pump, when used sequencing of drug administration. Mr. Jens Clemente tolerated treatment well and was discharged from [place] [address] in stable condition. Port left infusing with 5FU pump.    Future Appointments   Date Time Provider Dahlia Epps   11/30/2022  3:00 PM SS INF5 CH4 <1H [facility]   12/12/2022  7:30 AM [facility]   12/12/2022  8:45 AM Anay Cabrera MD [facility] BS AMB   12/14/2022  3:00 PM SS INF5 CH4 <1H [facility]   12/27/2022  7:30 AM SS INF2 CH2 >7H [facility]   12/29/2022  3:00 PM SS INF5 CH4 <1H [facility]. Hammad Dunn RN  November 28, 2022

## 2022-11-28 NOTE — PROGRESS NOTES
Palliative Medicine Office Visit  Palliative Medicine Nurse Check In  [phone]    Patient Name: Itz Hill. YOB: 1975      Date of Office Visit: 11/28/22    Patient states: \"follow up  \"    From Check In Sheet (scanned in Media):  Has a medical provider talked with you about cardiopulmonary resuscitation (CPR)? [] Yes   [x] No   [] Unable to obtain    Nurse reminder to complete or update ACP FlowSheet:    Is ACP on the Problem List?    [] Yes    [x] No  IF ACP Document is ON FILE; Nurse to place ACP on Problem List     Is there an ACP Note in Chart Review/Note? [] Yes    [x] No   If NO: [address] Planning 11/7/2022   Patient's Healthcare Decision Maker is: Legal Next of Kin   Confirm Advance Directive None   Patient Would Like to Complete Advance Directive No   Does the patient have other document types -       Is there anything that we should know about you as a person in order to provide you the best care possible? Have you been to the ER, urgent care clinic since your last visit? [] Yes   [x] No   [] Unable to obtain    Have you been hospitalized since your last visit? [] Yes   [x] No   [] Unable to obtain    Have you seen or consulted any other health care providers outside of the [address] [place] [address] since your last visit?    [] Yes   [x] No   [] Unable to obtain    Functional status (describe):   independent      Last BM: today     accessed (date): 11/28/22    Bottle review (for opioid pain medication):   Medication 1:  Oxycodone   Date filled:   Directions: 1 every 4 hours prn  # filled:   # left:   # pills taking per day:  Last dose taken:    Medication 2:    Date filled: Oxycontin  Directions: 1 every 12 hours  # filled:   # left:   # pills taking per day:  Last dose taken:

## 2022-11-28 NOTE — TELEPHONE ENCOUNTER
Palliative Medicine  Nursing Note  [phone]  Fax [phone]      Telephone Call  Patient Name: Shawanda Alexis. YOB: [birth year]        Primary Decision Maker: Melquiades Soliz - Girlfriend - [phone]   Advance Care Planning 11/7/2022   Patient's [address] is: Legal Next of Kin   Confirm Advance Directive None   Patient Would Like to Complete Advance Directive No   Does the patient have other document types -       Per Dr. Ziegler Asp, she increased Oxycontin 20mg 1 every 12 hours #60 for 30 day supply. Authorization initiated on covermymeds with directions to speak with pharmacy about calling insurance help desk. Call placed to [employer] but they are at lunch. Call placed to [facility] and the new prescription is approved through insurance but they will not have in stock until Wednesday. Call placed to [facility] and left voice message regarding the above and to please call palliative for any questions or concerns.     Medhat Scott RN  Palliative Medicine

## 2022-11-29 ENCOUNTER — TELEPHONE (OUTPATIENT)
Dept: ONCOLOGY | Age: 47
End: 2022-11-29

## 2022-11-29 NOTE — TELEPHONE ENCOUNTER
[facility] at [facility]  [phone]        11/29/22 12:02 PM Scheduled appointment on behalf of patient to see Dr. Norma Carter. Scheduled for Thursday, 12/01, at 2:20 PM. Patient to arrive at 2 PM. Attempted to call patient via home/cell number listed to notify him of above. No answer, left message requesting return call. Provided office phone number as well.      2:00 PM Patient returned call and spoke with Kim Boo, who notified him of above. Patient denied having any additional questions/concerns.

## 2022-11-30 ENCOUNTER — HOSPITAL ENCOUNTER (OUTPATIENT)
Dept: INFUSION THERAPY | Age: 47
Discharge: HOME | End: 2022-11-30
Payer: MEDICAID

## 2022-11-30 ENCOUNTER — APPOINTMENT (OUTPATIENT)
Dept: INFUSION THERAPY | Age: 47
End: 2022-11-30
Payer: MEDICAID

## 2022-11-30 VITALS
DIASTOLIC BLOOD PRESSURE: 85 MMHG | TEMPERATURE: 98.3 F | SYSTOLIC BLOOD PRESSURE: 136 MMHG | HEART RATE: 83 BPM | RESPIRATION RATE: 16 BRPM

## 2022-11-30 PROCEDURE — 96523 IRRIG DRUG DELIVERY DEVICE: CPT

## 2022-11-30 NOTE — PROGRESS NOTES
[facility] Progress Note    Date: 2022    Name: Mandy Pastor. MRN: [id number]         : 1975:            Mr. Max Morgan arrived ambulatory and in no distress for Pump Removal.  Assessment was completed, no acute issues at this time, no new complaints voiced. CADD completed in infusion center- 100 ml infused per order. Mr. Brandi Young vitals were reviewed. Visit Vitals  /85   Pulse 83   Temp 98.3 °F (36.8 °C)   Resp 16       Mr. Max Morgan was discharged from [place] [address] in stable condition. Port de-accessed, flushed & heparinized per protocol.      Future Appointments   Date Time Provider Dahlia Grissomi   2022  3:00 PM SS INF5 CH4 <1H [facility]   2022  2:20 PM Watson Vera MD [facility] BS AMB   2022  7:30 AM SS INF1 CH2 >7H [facility]   2022  8:45 AM Tata Ledbetter MD [facility]   2022  3:00 PM SS INF5 CH4 <1H [facility]   2022  7:30 AM SS INF2 CH2 >7H [facility]   2022 11:30 AM Mady Montalvo MD [facility] BS [facility]   2022  3:00 PM SS INF5 CH4 <1H [facility]. Rachelle Hough RN  2022

## 2022-12-05 ENCOUNTER — APPOINTMENT (OUTPATIENT)
Dept: INFUSION THERAPY | Age: 47
End: 2022-12-05
Payer: MEDICAID

## 2022-12-05 RX ORDER — DIPHENHYDRAMINE HYDROCHLORIDE 50 MG/ML
25 INJECTION, SOLUTION INTRAMUSCULAR; INTRAVENOUS AS NEEDED
Status: CANCELLED
Start: 2022-12-14

## 2022-12-05 RX ORDER — DEXTROSE MONOHYDRATE 50 MG/ML
25 INJECTION, SOLUTION INTRAVENOUS CONTINUOUS
Status: CANCELLED | OUTPATIENT
Start: 2022-12-14

## 2022-12-05 RX ORDER — HEPARIN 100 UNIT/ML
300-500 SYRINGE INTRAVENOUS AS NEEDED
Status: CANCELLED | OUTPATIENT
Start: 2022-12-14

## 2022-12-05 RX ORDER — HEPARIN 100 UNIT/ML
300-500 SYRINGE INTRAVENOUS AS NEEDED
Status: CANCELLED | OUTPATIENT
Start: 2022-12-22

## 2022-12-05 RX ORDER — DEXAMETHASONE SODIUM PHOSPHATE 4 MG/ML
8 INJECTION, SOLUTION INTRA-ARTICULAR; INTRALESIONAL; INTRAMUSCULAR; INTRAVENOUS; SOFT TISSUE ONCE
Status: CANCELLED
Start: 2022-12-14 | End: 2022-12-12

## 2022-12-05 RX ORDER — PALONOSETRON 0.05 MG/ML
0.25 INJECTION, SOLUTION INTRAVENOUS ONCE
Status: CANCELLED | OUTPATIENT
Start: 2022-12-14 | End: 2022-12-12

## 2022-12-05 RX ORDER — ATROPINE SULFATE 0.4 MG/ML
0.4 INJECTION, SOLUTION ENDOTRACHEAL; INTRAMEDULLARY; INTRAMUSCULAR; INTRAVENOUS; SUBCUTANEOUS
Status: CANCELLED | OUTPATIENT
Start: 2022-12-14

## 2022-12-05 RX ORDER — ALBUTEROL SULFATE 0.83 MG/ML
2.5 SOLUTION RESPIRATORY (INHALATION) AS NEEDED
Status: CANCELLED
Start: 2022-12-14

## 2022-12-05 RX ORDER — ONDANSETRON 2 MG/ML
8 INJECTION INTRAMUSCULAR; INTRAVENOUS AS NEEDED
Status: CANCELLED | OUTPATIENT
Start: 2022-12-14

## 2022-12-05 RX ORDER — SODIUM CHLORIDE 0.9 % (FLUSH) 0.9 %
10 SYRINGE (ML) INJECTION AS NEEDED
Status: CANCELLED | OUTPATIENT
Start: 2022-12-22

## 2022-12-05 RX ORDER — DIPHENHYDRAMINE HYDROCHLORIDE 50 MG/ML
50 INJECTION, SOLUTION INTRAMUSCULAR; INTRAVENOUS AS NEEDED
Status: CANCELLED
Start: 2022-12-14

## 2022-12-05 RX ORDER — ACETAMINOPHEN 325 MG/1
650 TABLET ORAL AS NEEDED
Status: CANCELLED
Start: 2022-12-14

## 2022-12-05 RX ORDER — SODIUM CHLORIDE 9 MG/ML
10 INJECTION INTRAVENOUS AS NEEDED
Status: CANCELLED | OUTPATIENT
Start: 2022-12-14

## 2022-12-05 RX ORDER — EPINEPHRINE 1 MG/ML
0.3 INJECTION, SOLUTION, CONCENTRATE INTRAVENOUS AS NEEDED
Status: CANCELLED | OUTPATIENT
Start: 2022-12-14

## 2022-12-05 RX ORDER — SODIUM CHLORIDE 9 MG/ML
25 INJECTION, SOLUTION INTRAVENOUS CONTINUOUS
Status: CANCELLED | OUTPATIENT
Start: 2022-12-14

## 2022-12-05 RX ORDER — SODIUM CHLORIDE 9 MG/ML
10 INJECTION INTRAVENOUS AS NEEDED
Status: CANCELLED | OUTPATIENT
Start: 2022-12-22

## 2022-12-05 RX ORDER — SODIUM CHLORIDE 0.9 % (FLUSH) 0.9 %
10 SYRINGE (ML) INJECTION AS NEEDED
Status: CANCELLED | OUTPATIENT
Start: 2022-12-14

## 2022-12-05 RX ORDER — HYDROCORTISONE SODIUM SUCCINATE 100 MG/2ML
100 INJECTION, POWDER, FOR SOLUTION INTRAMUSCULAR; INTRAVENOUS AS NEEDED
Status: CANCELLED | OUTPATIENT
Start: 2022-12-14

## 2022-12-07 ENCOUNTER — APPOINTMENT (OUTPATIENT)
Dept: INFUSION THERAPY | Age: 47
End: 2022-12-07
Payer: MEDICAID

## 2022-12-08 ENCOUNTER — TELEPHONE (OUTPATIENT)
Dept: ONCOLOGY | Age: 47
End: 2022-12-08

## 2022-12-08 NOTE — TELEPHONE ENCOUNTER
Called Dr. Seaman Client office to see if patient has appointment they stated no.  But made the appointment for Jan 11 at 3 they said they would call patient to let him know

## 2022-12-12 ENCOUNTER — HOSPITAL ENCOUNTER (OUTPATIENT)
Dept: INFUSION THERAPY | Age: 47
End: 2022-12-12

## 2022-12-12 ENCOUNTER — TELEPHONE (OUTPATIENT)
Dept: ONCOLOGY | Age: 47
End: 2022-12-12

## 2022-12-12 NOTE — PROGRESS NOTES
[id number] [facility] On[facility] at [facility]  [phone]    Hematology / Oncology Established Visit    Reason for Visit:   Mendel Nurse. is a 41 y.o. male who is seen by synchronous (real-time) audio-video technology on 12/13/2022 for follow up of colon cancer. Hematology Oncology Treatment History:     Diagnosis #1: Follicular lymphoma  Stage: IV  Pathology:   11/13/18 right inguinal LN excision: Follicular lymphoma, high-grade (grade 3a of 3). Prior Treatment:   1. Obinutuzumab-CHOP. Obinutuzumab: 1000 mg weekly on days 1, 8, 15 for cycle 1, then 1000 mg on day 1 q21 days for cycles 2-6, then monotherapy 1000 mg every 21 days for cycle 7, 8 with Cyclophosphamide 750mg/m2, Doxorubicin 50mg/m2, Vincristine 1.4mg/m2 on day 1 and Prednisone 100mg on Days 1-5, every 21 days for a total of 2 cycles completed late 1/2019. Regimen discontinued due to STEMI. 2. Obinutuzumab + Bendamustine: 1000 mg Obinutuzumab on day 1 + Bendamustine 90mg/m2 on days 1-2 on a 28-day cycle x 4 cycles, completed 5/2019  Current Treatment: Surveillance      Diagnosis #2: Colon cancer:  Stage: IV  Pathology:  Ascending colon; biopsy: poorly differentiated carcinoma  Comment: No dysplasia is identified. Immunohistochemical stains performed on block 1A, show the tumor positive for Cam5.2 and shows patchy positivity for CDX2 with a Ki-67 index of -90%; the tumor is focally positive for CK5/6 and GATA3; negative for p63, Pax8, CK7, CK20, panmelanoma, synaptophysin and chromogranin. The immunophenotypic features are nonspecific but argues somewhat against the following carcinoma: urothelial, neuroendocrine and breast. The immunophenotypic features also argues against melanoma and somewhat against classic colorectal carcinoma.   Additional immunohistochemical stains to exclude the possibility of prostate and hepatocellular carcinoma have been   ordered; the results will be reported as an addendum. -MLH1/MSH2/MSH6/PMS2 all intact with no loss of nuclear expression of MMR proteins - no MSI   Addendum: The addendum is issued to report the results of additional immunohistochemical stains in an attempt to ascertain the primary site of the carcinoma. The diagnosis is unchanged. Hepar and glypican 3 immunohistochemical stains are neg, arguing against hepatocellular carcinoma. PSA stain is negative, arguing against prostatic primary. Imaging studies to eval for poss primary sites maybe useful. In the absence of carcinoma/tumor at any other sites, this may represent an undifferentiated carcinoma of the colon.  -Oncogenomics (molecular) studies: POLE< ARID1A, YVONNE, ATR, BRCA2, CHECK1, FANCI, NF1, PIK3CA, PTCH1, PTEN, RAD50, RAD51, RB1, SMARCA4, STK11    Prior Treatment:   1. Loop ileostomy 2/19/22  2. Diagnostic laparoscopy with peritoneal biopsy, 4/27/22    Current Treatment: FOLFOXIRI every 2 weeks until disease progression or toxicity, 5/16/22 - current      Oncologic History:  Was in his usual state of health in early November 2018 when he developed low back pain and presented to the ER. Work-up at outside hospital revealed a retroperitoneal mass seen on CT imaging, and he was transferred to [facility] for further work-up. CT there showed a large retroperitoneal mass encircling the aorta with invasion of the left renal hilum and left adrenal gland. There were bilateral inguinal lymph nodes and moderate left hydronephrosis. He was evaluated while at [facility] and was noted to have palpable nodes in his groin for approximately the past 1 month. He underwent excisional LN biopsy of right inguinal LN, which revealed follicular lymphoma. At time of diagnosis, he had no cardiac disease aside from HTN, hyperlipidemia. However, he did have an NSTEMI after cycle 2 of O-CHOP. Was likely unrelated to chemotherapy, but opted to switch treatment to Obinutuzumab-Bendamustine.  He completed treatment in 5/2019 and had a CR based on PET. History of Present Illness:   Mr. Jerel Elizondo is a 41 y.o. male with is seen for follow up of colon cancer and C13 of treatment. Due to his ride situation, he is completing a virtual visit today and will proceed with treatment tomorrow. Evaluated by Dr. Silverio Pierce on 12/8/22, but was unable to attend due to ride. He continues to have painful, distended abdomen around colostomy site. Reports one episode of emesis over the weekend. Feels fatigued. Appetite is stable. No fevers, chills. PMHx: Lymphoma in 2018, CAD, HTN, Hyperlipidemia, Anxiety, chronic low back pain  PSurgHx: None aside from cardiac stent placement and port placement  SHx: Smokes 1/2ppd x past 20 yrs. Works part time as a cook. Has 2 children. FHx: Father had leukemia, passed away from pancreatic cancer. Review of Systems: A complete review of systems was obtained, negative except as described above. Physical Exam:       Due to this being a TeleHealth evaluation, many elements of the physical examination are unable to be assessed. Constitutional: Appears well-developed and well-nourished in no apparent distress   Mental status: Alert and awake, Oriented to person/place/time, Able to follow commands  Eyes: EOM normal, Sclera normal, No visible ocular discharge  HENT: Normocephalic, atraumatic; Mouth/Throat: Moist mucous membranes, External Ears normal  Neck: No visualized mass  Pulmonary/Chest: Respiratory effort normal, No visualized signs of difficulty breathing or respiratory distress   Musculoskeletal: Normal gait with no signs of ataxia, Normal range of motion of neck  Neurological: No facial asymmetry (Cranial nerve 7 motor function), No gaze palsy  Skin: No significant exanthematous lesions or discoloration noted on facial skin  Psychiatric: Normal affect, normal judgment/insight.  No hallucinations        Results:     Lab Results   Component Value Date/Time    WBC 3.5 (L) 11/28/2022 07:52 AM    HGB 11.1 (L) 2022 07:52 AM    HCT 33.7 (L) 2022 07:52 AM    PLATELET 286 (L)  07:52 AM    .8 (H) 2022 07:52 AM    ABS. NEUTROPHILS 1.5 (L) 2022 07:52 AM    Hemoglobin (POC) 15.0 2009 02:13 PM    Hematocrit (POC) 39 2019 01:24 PM     Lab Results   Component Value Date/Time    Sodium 140 2022 07:52 AM    Potassium 3.4 (L) 2022 07:52 AM    Chloride 109 (H) 2022 07:52 AM    CO2 25 2022 07:52 AM    Glucose 120 (H) 2022 07:52 AM    BUN 8 2022 07:52 AM    Creatinine 0.98 2022 07:52 AM    GFR est AA >60 10/03/2022 07:50 AM    GFR est non-AA >60 10/03/2022 07:50 AM    Calcium 9.4 2022 07:52 AM    Sodium (POC) 136 2019 01:24 PM    Potassium (POC) 3.9 2019 01:24 PM    Chloride (POC) 102 2019 01:24 PM    Glucose (POC) 249 (H) 02/15/2019 10:21 PM    BUN (POC) 14 2019 01:24 PM    Creatinine (POC) 0.9 2019 01:24 PM    Calcium, ionized (POC) 1.24 2019 01:24 PM     Lab Results   Component Value Date/Time    Bilirubin, total 0.4 2022 07:52 AM    ALT (SGPT) 29 2022 07:52 AM    Alk. phosphatase 348 (H) 2022 07:52 AM    Protein, total 6.7 2022 07:52 AM    Albumin 3.0 (L) 2022 07:52 AM    Globulin 3.7 2022 07:52 AM     Lab Results   Component Value Date/Time    Iron 18 (L) 2022 11:13 AM    TIBC 173 (L) 2022 11:13 AM    Iron % saturation 10 (L) 2022 11:13 AM    Ferritin 426 (H) 2022 11:13 AM       No results found for: B12LT, FOL, RBCF  Lab Results   Component Value Date/Time    TSH 1.53 2016 04:40 AM       18:       Labs at [facility]:  22: CA 19-9 = 390  22: CEA = 12.3  22: CEA = 19.2  22: CEA = 17.9   22: CEA = 6.0    Signatera MRD:  22: 295.97 MTM/mL  22: 2.98  22: 0.88   10/24/22: 37.87    Imagin/9/18 Abd/pelvis CT: IMPRESSION:  1.  Interval development of a large retroperitoneal mass encircling the aorta with invasion of the left renal hilum and left adrenal gland. Several adjacent lymph nodes are seen extending into the peritoneum and underneath the  diaphragmatic natalie. This most likely represents lymphoma. 2. Several new bilateral enlarged inguinal lymph nodes also likely representing lymphoma. 3. Moderate left hydronephrosis with a delayed renal nephrogram related to decreased renal function. This is related to the invasion of the renal hilum. 11/11/18 Chest CT: IMPRESSION:  Trace left pleural effusion. Bilateral lower lobe atelectasis. Large  retroperitoneal mass lesion again demonstrated. PET 12/18/18:  FINDINGS:  HEAD/NECK: Right palatine tonsil intense hypermetabolism, max SUV 18. Multilevel  bilateral cervical adenopathy, with max SUV 12 in a left supraclavicular node. Cerebral evaluation is limited by normal intense activity. CHEST: Solitary hypermetabolic left axillary node, max SUV 11. ABDOMEN/PELVIS: Bulky retroperitoneal mass max SUV 27, with several additional  small active abdomino-pelvic nodes. Bilateral inguinal nodes with max SUV 12 on  the left. SKELETON: No foci of abnormal hypermetabolism in the axial and visualized  appendicular skeleton. IMPRESSION:   1. Right palatine tonsil tumor involvement (Deauville 5). 2. Bilateral cervical delaney involvement (Deauville 5). 3. Left axillary node involvement (Deauville 5). 4. Bulky retroperitoneal lymphoma mass and additional smaller hypermetabolic  abdomino-pelvic nodes (Deauville 5). 5. Bilateral inguinal delaney involvement (Deauville 5). Deauville Five Point Scale  1. No uptake or no residual uptake (when used interim)  2. Slight uptake, but below blood pool (mediastinum)  3. Uptake above mediastinal, but below/equal to uptake in the liver  4. Uptake slightly to moderately higher than liver  5.  Markedly increased uptake or any new lesion (on response evaluation)  Each FDG-avid (or previously FDG avid) lesion is rated independently. Reference values:  Mediastinal blood pool: 2.1 SUV  Liver (background): 2.2 SUV    PET/CT 2/05/19:   IMPRESSION:  1. No Foci of Abnormal Hypermetabolism (Deauville 1). 2. Resolved activity in the right palatine tonsil, bilateral cervical nodes,left axillary node, retroperitoneal/abdominal pelvic adenopathy, bilateral inguinal nodes. Echo 2/14/19:  Normal cavity size, wall thickness and systolic function (ejection fraction normal). The muscle mass is normal. The cavity shape is normal. The estimated ejection fraction is 41 - 45%. Abnormal wall motion as described on the wall scoring diagram below. End-systolic volume is normal. Normal left ventricular strain. There is mild (grade 1) left ventricular diastolic dysfunction. Normal left ventricular diastolic pressure. End-diastolic volume is normal.    LE arterial duplex 2/22/19:  There is evidence of left groin pseudoaneurysm noted arising from distal common femoral artery, pseudo lobe measures 2.32cm x 2.58cm and pseudo neck length measuring 0.63cm. There is no evidence of hemodynamically significant left lower extremity arterial obstruction. JACLYN is 1.03 on the right and 1.02 on the left. LE arterial duplex s/p Thrombin Injection to Pseudoaneurysm 2/26/19:  Successful thrombin injection procedure of the left groin with no further flow seen. No evidence of hemodnyamically significant obstruction in the left lower extremity. Left lower extremity arterial duplex performed. Confirmed pseudoaneurysm in left groin with small neck. Following thrombin injection, no further flow seen in the pseudoaneurysm. The left common femoral, profunda femoral, femoral, popliteal, posterior tibial and anterior arteries were imaged. Mainly triphasic flow was seen with no evidence of significantly elevated velocities. Repeat LE arterial duplex 2/27/19:  Continued thrombosed left groin pseudoaneurysm following thrombin injection on 02/26/2019.   No flow or color fill is identified. The hematoma measures approximately 2.1 x 2.9 cm in diameter. The common femoral, deep femoral, femoral, and popliteal arteries are patent with mainly tri-phasic flow and no significant hemodynamically obstruction is noted. Stress 5/31/19:  Normal stress myocardial perfusion without ischemia or infact at 84% MPHR. Normal LV function. LVEF 60%. No EKG changes of ischemia at peak exercise. Normal functional capacity. PET 6/03/19: IMPRESSION: No Foci of Abnormal Hypermetabolism (Deauville 1). -MRI lumbar spine 12/18/19:  Mild disc degenerative change at L3-4 and L4-5. Mild canal stenosis at L3-4 and mild left foraminal stenosis at L4-5. Other less severe degenerative findings are as described above. Continued diminished size of retroperitoneal mass-adenopathy,  with diminished soft tissue density at the left renal hilum. -CT chest/abdomen 12/16/19:  Findings are consistent with interval response to therapy    CT c/a/p 5/28/20: IMPRESSION:  Further contraction of the retroperitoneal mantle and chris mesentery,  compatible with treatment response  Stable left hilar soft tissue mass  Slight increased splaying of the duodenum and proximal jejunum is the result, without obstruction  No evidence for recurrence of lymphoma in the chest, abdomen, or pelvis    CT c/a/p 2/13/22:  FINDINGS:   LOWER THORAX: Dependent atelectasis with otherwise clear lungs. The visualized  heart is normal in size without pericardial effusion. LIVER: No mass. BILIARY TREE: Gallbladder is unremarkable. CBD is not dilated. SPLEEN: Unremarkable. PANCREAS: No mass or ductal dilatation. ADRENALS: Unremarkable. KIDNEYS: Atrophic left kidney with mild left hydronephrosis. Right kidney is  unremarkable with no stone, enhancing mass, or other renal abnormality. STOMACH: Unremarkable. SMALL BOWEL: No dilatation or wall thickening.   COLON: Circumferential wall thickening at the hepatic flexure with luminal  narrowing, adjacent mesenteric stranding, and fluid with upstream retention in  the cecum and fecalization of distal ileal contents. No dilation or other wall  thickening. APPENDIX: Unremarkable. PERITONEUM: Moderate free fluid with no pneumoperitoneum. RETROPERITONEUM: Soft tissue stranding around the celiac and SMA and associated aorta with no discrete mass or adenopathy. Aorta is normal in size without aneurysm or dissection. REPRODUCTIVE ORGANS: Prostate and seminal vesicles appear unremarkable. URINARY BLADDER: No mass or calculus. BONES: No destructive bone lesion. ABDOMINAL WALL: No mass or hernia. ADDITIONAL COMMENTS: N/A  IMPRESSION  1. Annular mass lesion of the right colon with upstream fecal retention  concerning for primary colon neoplasm versus less likely lymphoma. 2. Soft tissue stranding around celiac axis, SMA, and abdominal aorta may  reflect infiltrative lymphoma. 3. Left renal atrophy with left hydronephrosis likely reflecting chronic  proximal ureteral obstruction. 4. Incidentals as above. 2/24/22 CT abd/pelvis at [facility]:  FINDINGS: An enteric tube with sidehole beyond the level of the lower esophageal sphincter is seen looping on itself in the proximal stomach before terminating in the mid stomach. Status post right lower quadrant diverting ileostomy for an obstructing colonic mass. Multiple loops of gas dilated small bowel remain, with a maximal diameter of 5.4 cm whereas previously measured 3.6 cm. Dilute contrast is seen within the lateral left abdominal dilated small bowel. Moderate stool burden and bowel gas opacifies the cecum, measuring 7.3 cm in diameter. No definite supine evidence of pneumoperitoneum. Lung bases: Limited evaluation of the lung bases demonstrates a cardiac silhouette within normal limits for size. A small bore central venous catheter is seen terminating in the right atrium. No focal consolidation or pleural effusion.     2/24/22 CT abd/pelvis [facility]:  IMPRESSION:  1. Status post right lower quadrant loop ileostomy. Mild diffuse dilation of small bowel proximal to the ostomy in the lower abdomen and upper pelvis concerning for at least mild to moderate partial bowel obstruction. Relatively decompressed loop ileostomy. 2. Mildly complex pelvic fluid collection in the rectovesical space, decreased in size from prior. 3. Redemonstrated ascending colon mass and findings suspicious for regional metastatic disease. 4. Redemonstrated soft tissue in the retroperitoneum with prominent lymph nodes, similar to prior examination. Differential would include metastasis, retroperitoneal fibrosis. 5. Mild atherosclerosis. 6. Subcentimeter right renal hypodensity, too small to characterize. 7. Severe compression of the left renal vein, similar to prior examination. 8. Additional findings as described above. Bones: No acute osseous abnormality. 2/24/22 CT chest [facility]:  IMPRESSION:  FINAL report. 1. No evidence of metastatic disease to the chest.  2. No enlarged lymph nodes. 3. Increasing volume loss in the lung bases which may be due to atelectasis. 4. CT abdomen and pelvis reported separately. 2/25/22 Colonoscopy at [facility]:  Impression:            - Preparation of the colon was inadequate. - Stool in the entire examined colon. - Likely malignant completely obstructing tumor at the                         hepatic flexure. Biopsied.                        - Malignant-appearing tumor in the colon. Complications:         No immediate complications. 7/19/22 CT ch/abd/pelv:  IMPRESSION  Response to treatment characterized by decrease in size of the apical core  lesion in the proximal transverse colon and decreased mucosal colic  lymphadenopathy. Persistent mesenteric lymphadenopathy and retroperitoneal/mesenteric soft tissue  which may relate to the patient's history of lymphoma.   Chronic left renal atrophy with chronic left hydronephrosis. RECIST:  Target lesions:  Transverse colon mass, series 2, image 75, 27 x 26 mm, previously 49 x 45 mm. Nontarget lesions:  Transverse mesocolic lymph nodes, decreased. 10/10/22 CT ch/abd/pelv:  IMPRESSION  Increase in peritoneal disease compared to the prior examination. Stable  mesenteric infiltrate. Improvement in the mass lesion involving the transverse  colon. RECIST:  Target lesion:  Transverse colon mass, series 2, image 76, 1.7 x 1.3 cm, previously 2.7 x 2.6cm. Nontarget lesions:   Transverse mesial colic lymph nodes unchanged. Assessment & Plan:   Mendel Nurse. is a 41 y.o. male comes in for evaluation and management of lymphoma. 1. Undifferentiated carcinoma of transverse colon, metastatic, KRAS/NRAS status unclear:  S/p diverting ileostomy due to large bowel obstruction. Colonoscopy with biopsy on 2/25/22. No obvious metastatic disease on CT imaging, but did have obvious metastatic disease to peritoneum during laparoscopy with Dr. Nicholas Lim. I recommended FOLFOXIRI every 2 weeks. Will not give Bevacizumab given h/o MI and tobacco use. [employer] suggests: BRCA2 and YVONNE mutations support use of Olaparib or similar PARP inhibitor in future. They also suggest testing for TMB, PDL1 to determine utility of checkpoint inhibitors. CT after C9 showed good response with decrease in size of colon mass on 10/10/22. Supportive medications: zofran, compazine, dexamethasone, EMLA topical  -- Proceed with C13 FOLFOXIRI on 12/14/22 with pump removal on 12/16/22. Dose-reduced Irinotecan by 10% to avoid cytopenias/treatment delays. -- Repeat Signatera on 12/13/22. Looking into adding [employer] or alternate NGS testing to determine TMB, MSI, PDL1, KRAS/NRAS/BRAF. -- Check CEA every 8 weeks  -- Consider repeat colonoscopy in 4-6 months given incomplete colonoscopy.   -- Can cut Dex down to 4mg on D2,3 given increased anxiety those days and dex premed decreased 8mg.   -- CT after every 4 cycles, due next week. -- Return in 2 weeks for C14 of FOLFOXIRI. Will switch to Tuesdays. One day early on 47/71/37.     2. H/o Follicular lymphoma:   Grade 3a with bulky disease encircling the aorta, causing pain. Bone marrow negative. BR better than RCHOP, but based on GALLIUM study, Obinutuzumab-based induction and maintenance prolongs PFS over that seen with rituximab-based therapy. Therefore, pt started on O-CHOP regimen. Pt achieved CR after C2, but was switched to BR x 4 cycles given STEMI. Completed treatment 5/2019. End of treatment PET 6/3/19 showed CR. No extra surveillance needed at this time given metastatic colon cancer with frequent imaging. Current imaging shows slight increase in soft tissue density in deep pelvis on 10/2022. Will continue to monitor for colon cancer follow up imaging. 3. Chronic lumbar back pain / anxiety / RLQ:   Left lower back pain is chronic/stable and RLQ is likely related to neoplasm/colostomy/parastomal hernia - currently managing pain on Oxycontin 10mg q12h, oxycodone 10mg q4h prn. Following with Dr. Richard Taylor. -- Consult Dr. Carlos Thakkar regarding parastomal hernia but no showed to appt due to ride. He will reschedule. 4. CAD / HTN:   h/o STEMI 2/14/19 with TOM placed to proximal LAD. Following with Dr. Gertrudis Orozco and remains on dual antiplatelet therapy, high dose Lipitor, Metoprolol, ACEI. Received only 2 doses of cardiotoxic chemo, Doxorubicin in early 1/2019. Is overdue for follow up with Dr. Gertrudis Orozco.   -- Not currently taking Metoprolol or ACEI. Referred back to Dr. Gertrudis Orozco since he is overdue for follow up. Scheduled 1/11/23.     5. Tobacco abuse:   Previously discussed smoking cessation strategies and benefits. Pt has tried quitting in the past and is not interested. 6. BRCA2 mutation:  Pt would benefit from genetic counseling for himself and his family. 7. Chemotherapy induced neutropenia:  Intermittent.  Neulasta added with C3-4, but now on hold due to insurance denial.   -- Consider adding Ziextendo if persistent neutropenia. 8. Chemotherapy induced thrombocytopenia:   Monitor for bleeding. Goals of care: Disease is not curable, but is treatable to improve quality and duration of life. Total physician time spent on this encounter was 30 minutes. Ning Echevarria Sr. was evaluated through a synchronous (real-time) audio-video encounter. The patient (or guardian if applicable) is aware that this is a billable service, which includes applicable co-pays. This Virtual Visit was conducted with patient's (and/or legal guardian's) consent. The visit was conducted pursuant to the emergency declaration under the [address] [place] [address] authority and the [employer] and [employer] General Act. Patient identification was verified, and a caregiver was present when appropriate. The patient was located at: Home: [id number] [facility] [id number]  The provider was located at: Facility (Appt Department): [address] [place], [place] [id number]        I personally saw and evaluated the patient and performed the key components of medical decision making. The history, physical exam, and documentation were performed by Arelis Romero NP. I reviewed and verified the above documentation and modified it as needed. Will proceed with C13 of FOLFOXIRI. Discussed episode of vomiting. No change in bowel habits. Pt is due for CT imaging next week which we will review at next visit.          Signed By: Krystal Mixon MD

## 2022-12-12 NOTE — TELEPHONE ENCOUNTER
[facility]   Oncology Social Work  Encounter     Patient Name: Umu Mera History: colon cancer,     Advance Directives: h/o lymphoma     Narrative: Received call from patient requesting transportation resources. Patient has been relying on friend for transportation to appointments but that has been unreliable. Advised would follow up with resources. Called [facility] and confirmed [facility] ([phone]) is active in his area. Patient can call listed number to schedule trips. Called [facility] ([phone]) and confirmed patient has transportation through insurance. Transportation requires 3 days notice. Patient can call list number to schedule trips. Called patient and provided the above resources and way to schedule trips. Both transportation resources a no cost to patient. He voiced understanding and appreciation.      Barriers to Care: transportation     Plan: transportation resources provided     Referral/Handouts:   Transportation referral    Thank you,  Venice Hart LCSW

## 2022-12-12 NOTE — TELEPHONE ENCOUNTER
Patient called and LVM and stated that his ride did not show and he can not make it in today. He wanted to know if he can come Wednesday or if he was to wait till next week.   He also wants to talk to [facility]

## 2022-12-13 ENCOUNTER — TELEPHONE (OUTPATIENT)
Dept: ONCOLOGY | Age: 47
End: 2022-12-13

## 2022-12-13 ENCOUNTER — VIRTUAL VISIT (OUTPATIENT)
Dept: ONCOLOGY | Age: 47
End: 2022-12-13
Payer: MEDICAID

## 2022-12-13 DIAGNOSIS — R10.31 RLQ ABDOMINAL PAIN: ICD-10-CM

## 2022-12-13 DIAGNOSIS — C18.4 CARCINOMA OF TRANSVERSE COLON (HCC): Primary | ICD-10-CM

## 2022-12-13 DIAGNOSIS — D69.6 THROMBOCYTOPENIA (HCC): ICD-10-CM

## 2022-12-13 DIAGNOSIS — Z51.11 CHEMOTHERAPY MANAGEMENT, ENCOUNTER FOR: ICD-10-CM

## 2022-12-13 DIAGNOSIS — Z85.72 HISTORY OF FOLLICULAR LYMPHOMA: ICD-10-CM

## 2022-12-13 DIAGNOSIS — K43.5 PARASTOMAL HERNIA WITHOUT OBSTRUCTION OR GANGRENE: ICD-10-CM

## 2022-12-13 PROCEDURE — 99215 OFFICE O/P EST HI 40 MIN: CPT | Performed by: INTERNAL MEDICINE

## 2022-12-13 RX ORDER — HEPARIN 100 UNIT/ML
300-500 SYRINGE INTRAVENOUS AS NEEDED
OUTPATIENT
Start: 2023-01-02

## 2022-12-13 RX ORDER — SODIUM CHLORIDE 9 MG/ML
10 INJECTION INTRAMUSCULAR; INTRAVENOUS; SUBCUTANEOUS AS NEEDED
OUTPATIENT
Start: 2023-01-02

## 2022-12-13 RX ORDER — ATROPINE SULFATE 0.4 MG/ML
0.4 INJECTION, SOLUTION ENDOTRACHEAL; INTRAMEDULLARY; INTRAMUSCULAR; INTRAVENOUS; SUBCUTANEOUS
OUTPATIENT
Start: 2023-01-02

## 2022-12-13 RX ORDER — DIPHENHYDRAMINE HYDROCHLORIDE 50 MG/ML
50 INJECTION, SOLUTION INTRAMUSCULAR; INTRAVENOUS AS NEEDED
Start: 2023-01-02

## 2022-12-13 RX ORDER — SODIUM CHLORIDE 9 MG/ML
10 INJECTION INTRAMUSCULAR; INTRAVENOUS; SUBCUTANEOUS AS NEEDED
OUTPATIENT
Start: 2023-01-04

## 2022-12-13 RX ORDER — ALBUTEROL SULFATE 0.83 MG/ML
2.5 SOLUTION RESPIRATORY (INHALATION) AS NEEDED
Start: 2023-01-02

## 2022-12-13 RX ORDER — ONDANSETRON 2 MG/ML
8 INJECTION INTRAMUSCULAR; INTRAVENOUS AS NEEDED
OUTPATIENT
Start: 2023-01-02

## 2022-12-13 RX ORDER — SODIUM CHLORIDE 9 MG/ML
25 INJECTION, SOLUTION INTRAVENOUS CONTINUOUS
OUTPATIENT
Start: 2023-01-02

## 2022-12-13 RX ORDER — HYDROCORTISONE SODIUM SUCCINATE 100 MG/2ML
100 INJECTION, POWDER, FOR SOLUTION INTRAMUSCULAR; INTRAVENOUS AS NEEDED
OUTPATIENT
Start: 2023-01-02

## 2022-12-13 RX ORDER — HEPARIN 100 UNIT/ML
300-500 SYRINGE INTRAVENOUS AS NEEDED
OUTPATIENT
Start: 2023-01-04

## 2022-12-13 RX ORDER — SODIUM CHLORIDE 0.9 % (FLUSH) 0.9 %
10 SYRINGE (ML) INJECTION AS NEEDED
OUTPATIENT
Start: 2023-01-04

## 2022-12-13 RX ORDER — PALONOSETRON 0.05 MG/ML
0.25 INJECTION, SOLUTION INTRAVENOUS ONCE
OUTPATIENT
Start: 2023-01-02 | End: 2022-12-27

## 2022-12-13 RX ORDER — EPINEPHRINE 1 MG/ML
0.3 INJECTION, SOLUTION, CONCENTRATE INTRAVENOUS AS NEEDED
OUTPATIENT
Start: 2023-01-02

## 2022-12-13 RX ORDER — ACETAMINOPHEN 325 MG/1
650 TABLET ORAL AS NEEDED
Start: 2023-01-02

## 2022-12-13 RX ORDER — DEXTROSE MONOHYDRATE 50 MG/ML
25 INJECTION, SOLUTION INTRAVENOUS CONTINUOUS
OUTPATIENT
Start: 2023-01-02

## 2022-12-13 RX ORDER — DEXAMETHASONE SODIUM PHOSPHATE 4 MG/ML
8 INJECTION, SOLUTION INTRA-ARTICULAR; INTRALESIONAL; INTRAMUSCULAR; INTRAVENOUS; SOFT TISSUE ONCE
Start: 2023-01-02 | End: 2022-12-27

## 2022-12-13 RX ORDER — SODIUM CHLORIDE 0.9 % (FLUSH) 0.9 %
10 SYRINGE (ML) INJECTION AS NEEDED
OUTPATIENT
Start: 2023-01-02

## 2022-12-13 RX ORDER — DIPHENHYDRAMINE HYDROCHLORIDE 50 MG/ML
25 INJECTION, SOLUTION INTRAMUSCULAR; INTRAVENOUS AS NEEDED
Start: 2023-01-02

## 2022-12-13 NOTE — TELEPHONE ENCOUNTER
[address] at [facility]  [phone]        12/13/22 9:08 AM Attempted to call patient via home/mobile number to check on him as he has not yet logged on for his virtual visit. No answer, left message. Provided office phone number as well for call back.

## 2022-12-13 NOTE — PROGRESS NOTES
Alice Hamilton is a 41 y.o. male here for follow up of colon cancer. Patient with complaints of generalized pain, but more so in lower stomach. Rates pain as a 7 out of 10.

## 2022-12-14 ENCOUNTER — TELEPHONE (OUTPATIENT)
Dept: ONCOLOGY | Age: 47
End: 2022-12-14

## 2022-12-14 ENCOUNTER — HOSPITAL ENCOUNTER (OUTPATIENT)
Dept: INFUSION THERAPY | Age: 47
End: 2022-12-14

## 2022-12-14 ENCOUNTER — HOSPITAL ENCOUNTER (OUTPATIENT)
Dept: INFUSION THERAPY | Age: 47
Discharge: HOME | End: 2022-12-14
Payer: MEDICAID

## 2022-12-14 VITALS
OXYGEN SATURATION: 96 % | WEIGHT: 141.8 LBS | BODY MASS INDEX: 22.26 KG/M2 | TEMPERATURE: 98.6 F | HEIGHT: 67 IN | DIASTOLIC BLOOD PRESSURE: 86 MMHG | HEART RATE: 83 BPM | RESPIRATION RATE: 16 BRPM | SYSTOLIC BLOOD PRESSURE: 124 MMHG

## 2022-12-14 DIAGNOSIS — C18.9 COLON ADENOCARCINOMA (HCC): ICD-10-CM

## 2022-12-14 DIAGNOSIS — T45.1X5A CHEMOTHERAPY INDUCED NEUTROPENIA (HCC): Primary | ICD-10-CM

## 2022-12-14 DIAGNOSIS — D70.1 CHEMOTHERAPY INDUCED NEUTROPENIA (HCC): Primary | ICD-10-CM

## 2022-12-14 DIAGNOSIS — E87.6 HYPOKALEMIA: Primary | ICD-10-CM

## 2022-12-14 DIAGNOSIS — Z76.89 PREVENTION OF CHEMOTHERAPY-INDUCED NEUTROPENIA: ICD-10-CM

## 2022-12-14 DIAGNOSIS — C82.90 FOLLICULAR LYMPHOMA, UNSPECIFIED FOLLICULAR LYMPHOMA TYPE, UNSPECIFIED BODY REGION (HCC): ICD-10-CM

## 2022-12-14 LAB
ALBUMIN SERPL-MCNC: 2.9 G/DL (ref 3.5–5)
ALBUMIN/GLOB SERPL: 0.8 {RATIO} (ref 1.1–2.2)
ALP SERPL-CCNC: 431 U/L (ref 45–117)
ALT SERPL-CCNC: 30 U/L (ref 12–78)
ANION GAP SERPL CALC-SCNC: 6 MMOL/L (ref 5–15)
AST SERPL-CCNC: 26 U/L (ref 15–37)
BASOPHILS # BLD: 0 K/UL (ref 0–0.1)
BASOPHILS NFR BLD: 1 % (ref 0–1)
BILIRUB SERPL-MCNC: 0.4 MG/DL (ref 0.2–1)
BUN SERPL-MCNC: 7 MG/DL (ref 6–20)
BUN/CREAT SERPL: 8 (ref 12–20)
CALCIUM SERPL-MCNC: 9.6 MG/DL (ref 8.5–10.1)
CEA SERPL-MCNC: 31 NG/ML
CHLORIDE SERPL-SCNC: 107 MMOL/L (ref 97–108)
CO2 SERPL-SCNC: 28 MMOL/L (ref 21–32)
CREAT SERPL-MCNC: 0.92 MG/DL (ref 0.7–1.3)
DIFFERENTIAL METHOD BLD: ABNORMAL
EOSINOPHIL # BLD: 0.2 K/UL (ref 0–0.4)
EOSINOPHIL NFR BLD: 8 % (ref 0–7)
ERYTHROCYTE [DISTWIDTH] IN BLOOD BY AUTOMATED COUNT: 14.5 % (ref 11.5–14.5)
GLOBULIN SER CALC-MCNC: 3.8 G/DL (ref 2–4)
GLUCOSE SERPL-MCNC: 108 MG/DL (ref 65–100)
HCT VFR BLD AUTO: 30.3 % (ref 36.6–50.3)
HGB BLD-MCNC: 10 G/DL (ref 12.1–17)
IMM GRANULOCYTES # BLD AUTO: 0 K/UL (ref 0–0.04)
IMM GRANULOCYTES NFR BLD AUTO: 0 % (ref 0–0.5)
LYMPHOCYTES # BLD: 0.9 K/UL (ref 0.8–3.5)
LYMPHOCYTES NFR BLD: 45 % (ref 12–49)
MCH RBC QN AUTO: 33.1 PG (ref 26–34)
MCHC RBC AUTO-ENTMCNC: 33 G/DL (ref 30–36.5)
MCV RBC AUTO: 100.3 FL (ref 80–99)
MONOCYTES # BLD: 0.5 K/UL (ref 0–1)
MONOCYTES NFR BLD: 24 % (ref 5–13)
NEUTS SEG # BLD: 0.5 K/UL (ref 1.8–8)
NEUTS SEG NFR BLD: 22 % (ref 32–75)
NRBC # BLD: 0 K/UL (ref 0–0.01)
NRBC BLD-RTO: 0 PER 100 WBC
PLATELET # BLD AUTO: 134 K/UL (ref 150–400)
PMV BLD AUTO: 10.8 FL (ref 8.9–12.9)
POTASSIUM SERPL-SCNC: 3.3 MMOL/L (ref 3.5–5.1)
PROT SERPL-MCNC: 6.7 G/DL (ref 6.4–8.2)
RBC # BLD AUTO: 3.02 M/UL (ref 4.1–5.7)
RBC MORPH BLD: ABNORMAL
SODIUM SERPL-SCNC: 141 MMOL/L (ref 136–145)
WBC # BLD AUTO: 2.1 K/UL (ref 4.1–11.1)

## 2022-12-14 PROCEDURE — 80053 COMPREHEN METABOLIC PANEL: CPT

## 2022-12-14 PROCEDURE — 36591 DRAW BLOOD OFF VENOUS DEVICE: CPT

## 2022-12-14 PROCEDURE — 74011000250 HC RX REV CODE- 250: Performed by: INTERNAL MEDICINE

## 2022-12-14 PROCEDURE — 85025 COMPLETE CBC W/AUTO DIFF WBC: CPT

## 2022-12-14 PROCEDURE — 74011250636 HC RX REV CODE- 250/636: Performed by: INTERNAL MEDICINE

## 2022-12-14 PROCEDURE — 77030016057 HC NDL HUBR APOL -B

## 2022-12-14 PROCEDURE — 82378 CARCINOEMBRYONIC ANTIGEN: CPT

## 2022-12-14 RX ORDER — SODIUM CHLORIDE 0.9 % (FLUSH) 0.9 %
10 SYRINGE (ML) INJECTION AS NEEDED
OUTPATIENT
Start: 2022-12-20

## 2022-12-14 RX ORDER — HYDROCORTISONE SODIUM SUCCINATE 100 MG/2ML
100 INJECTION, POWDER, FOR SOLUTION INTRAMUSCULAR; INTRAVENOUS AS NEEDED
OUTPATIENT
Start: 2022-12-20

## 2022-12-14 RX ORDER — DIPHENHYDRAMINE HYDROCHLORIDE 50 MG/ML
25 INJECTION, SOLUTION INTRAMUSCULAR; INTRAVENOUS AS NEEDED
Start: 2022-12-20

## 2022-12-14 RX ORDER — HEPARIN 100 UNIT/ML
300-500 SYRINGE INTRAVENOUS AS NEEDED
Status: ACTIVE | OUTPATIENT
Start: 2022-12-14 | End: 2022-12-14

## 2022-12-14 RX ORDER — SODIUM CHLORIDE 0.9 % (FLUSH) 0.9 %
10 SYRINGE (ML) INJECTION AS NEEDED
Status: DISPENSED | OUTPATIENT
Start: 2022-12-14 | End: 2022-12-14

## 2022-12-14 RX ORDER — SODIUM CHLORIDE 9 MG/ML
10 INJECTION INTRAMUSCULAR; INTRAVENOUS; SUBCUTANEOUS AS NEEDED
OUTPATIENT
Start: 2022-12-20

## 2022-12-14 RX ORDER — SODIUM CHLORIDE 9 MG/ML
10 INJECTION INTRAMUSCULAR; INTRAVENOUS; SUBCUTANEOUS AS NEEDED
Status: ACTIVE | OUTPATIENT
Start: 2022-12-14 | End: 2022-12-14

## 2022-12-14 RX ORDER — EPINEPHRINE 1 MG/ML
0.3 INJECTION, SOLUTION, CONCENTRATE INTRAVENOUS AS NEEDED
OUTPATIENT
Start: 2022-12-20

## 2022-12-14 RX ORDER — ALBUTEROL SULFATE 0.83 MG/ML
2.5 SOLUTION RESPIRATORY (INHALATION) AS NEEDED
Start: 2022-12-20

## 2022-12-14 RX ORDER — POTASSIUM CHLORIDE 750 MG/1
10 TABLET, EXTENDED RELEASE ORAL DAILY
Qty: 90 TABLET | Refills: 1 | Status: SHIPPED | OUTPATIENT
Start: 2022-12-14

## 2022-12-14 RX ORDER — DIPHENHYDRAMINE HYDROCHLORIDE 50 MG/ML
50 INJECTION, SOLUTION INTRAMUSCULAR; INTRAVENOUS AS NEEDED
Start: 2022-12-20

## 2022-12-14 RX ORDER — ONDANSETRON 2 MG/ML
8 INJECTION INTRAMUSCULAR; INTRAVENOUS AS NEEDED
OUTPATIENT
Start: 2022-12-20

## 2022-12-14 RX ORDER — DEXTROSE MONOHYDRATE 50 MG/ML
25 INJECTION, SOLUTION INTRAVENOUS CONTINUOUS
OUTPATIENT
Start: 2022-12-20

## 2022-12-14 RX ORDER — PALONOSETRON 0.05 MG/ML
0.25 INJECTION, SOLUTION INTRAVENOUS ONCE
OUTPATIENT
Start: 2022-12-20 | End: 2022-12-20

## 2022-12-14 RX ORDER — ATROPINE SULFATE 0.4 MG/ML
0.4 INJECTION, SOLUTION ENDOTRACHEAL; INTRAMEDULLARY; INTRAMUSCULAR; INTRAVENOUS; SUBCUTANEOUS
OUTPATIENT
Start: 2022-12-20

## 2022-12-14 RX ORDER — SODIUM CHLORIDE 9 MG/ML
25 INJECTION, SOLUTION INTRAVENOUS CONTINUOUS
OUTPATIENT
Start: 2022-12-20

## 2022-12-14 RX ORDER — DEXAMETHASONE SODIUM PHOSPHATE 4 MG/ML
8 INJECTION, SOLUTION INTRA-ARTICULAR; INTRALESIONAL; INTRAMUSCULAR; INTRAVENOUS; SOFT TISSUE ONCE
Start: 2022-12-20 | End: 2022-12-20

## 2022-12-14 RX ORDER — HEPARIN 100 UNIT/ML
300-500 SYRINGE INTRAVENOUS AS NEEDED
OUTPATIENT
Start: 2022-12-20

## 2022-12-14 RX ORDER — ACETAMINOPHEN 325 MG/1
650 TABLET ORAL AS NEEDED
Start: 2022-12-20

## 2022-12-14 RX ADMIN — Medication 500 UNITS: at 10:02

## 2022-12-14 RX ADMIN — SODIUM CHLORIDE, PRESERVATIVE FREE 10 ML: 5 INJECTION INTRAVENOUS at 10:02

## 2022-12-14 NOTE — PROGRESS NOTES
[facility] Progress Note    Date: 2022    Name: Shawanda Alexis. MRN: [id number]         : 1975    Mr. Sara Ashby Arrived ambulatory and in no distress for C13D1 of  FolfoxiriRegimen. Assessment was completed, patient states he feels a little weak, fatigue. Right chest wall port accessed without difficulty, labs drawn & sent for processing. Results are not within parameters to treat. RN notified MD office. Written orders from Deon Zavala NP to hold treatment today and reschedule for next Tuesday due to low ANC. RN informed patient, patient verbalized understanding. Chemotherapy Flowsheet 2022   Cycle C13D1   Date 2022   Drug / Regimen Folfoxiri   Post Hydration -   Pre Meds -   Notes HELD       Mr. Yossi Hernandez vitals were reviewed. Visit Vitals  /86 (BP 1 Location: Right arm, BP Patient Position: Sitting)   Pulse 83   Temp 98.6 °F (37 °C)   Resp 16   Ht 5' 7\" (1.702 m)   Wt 64.3 kg (141 lb 12.8 oz)   SpO2 96%   BMI 22.21 kg/m²       Lab results were obtained and reviewed. Recent Results (from the past 12 hour(s))   METABOLIC PANEL, COMPREHENSIVE    Collection Time: 22  8:20 AM   Result Value Ref Range    Sodium 141 136 - 145 mmol/L    Potassium 3.3 (L) 3.5 - 5.1 mmol/L    Chloride 107 97 - 108 mmol/L    CO2 28 21 - 32 mmol/L    Anion gap 6 5 - 15 mmol/L    Glucose 108 (H) 65 - 100 mg/dL    BUN 7 6 - 20 MG/DL    Creatinine 0.92 0.70 - 1.30 MG/DL    BUN/Creatinine ratio 8 (L) 12 - 20      eGFR >60 >60 ml/min/1.73m2    Calcium 9.6 8.5 - 10.1 MG/DL    Bilirubin, total 0.4 0.2 - 1.0 MG/DL    ALT (SGPT) 30 12 - 78 U/L    AST (SGOT) 26 15 - 37 U/L    Alk.  phosphatase 431 (H) 45 - 117 U/L    Protein, total 6.7 6.4 - 8.2 g/dL    Albumin 2.9 (L) 3.5 - 5.0 g/dL    Globulin 3.8 2.0 - 4.0 g/dL    A-G Ratio 0.8 (L) 1.1 - 2.2     CBC WITH AUTOMATED DIFF    Collection Time: 22  8:20 AM   Result Value Ref Range    WBC 2.1 (L) 4.1 - 11.1 K/uL    RBC 3.02 (L) 4.10 - 5.70 M/uL    HGB 10.0 (L) 12.1 - 17.0 g/dL    HCT 30.3 (L) 36.6 - 50.3 %    .3 (H) 80.0 - 99.0 FL    MCH 33.1 26.0 - 34.0 PG    MCHC 33.0 30.0 - 36.5 g/dL    RDW 14.5 11.5 - 14.5 %    PLATELET 874 (L) 172 - 400 K/uL    MPV 10.8 8.9 - 12.9 FL    NRBC 0.0 0  WBC    ABSOLUTE NRBC 0.00 0.00 - 0.01 K/uL    NEUTROPHILS 22 (L) 32 - 75 %    LYMPHOCYTES 45 12 - 49 %    MONOCYTES 24 (H) 5 - 13 %    EOSINOPHILS 8 (H) 0 - 7 %    BASOPHILS 1 0 - 1 %    IMMATURE GRANULOCYTES 0 0.0 - 0.5 %    ABS. NEUTROPHILS 0.5 (L) 1.8 - 8.0 K/UL    ABS. LYMPHOCYTES 0.9 0.8 - 3.5 K/UL    ABS. MONOCYTES 0.5 0.0 - 1.0 K/UL    ABS. EOSINOPHILS 0.2 0.0 - 0.4 K/UL    ABS. BASOPHILS 0.0 0.0 - 0.1 K/UL    ABS. IMM. GRANS. 0.0 0.00 - 0.04 K/UL    DF SMEAR SCANNED      RBC COMMENTS ANISOCYTOSIS  1+           Medications:  Medications Administered       heparin (porcine) pf 300-500 Units       Admin Date  12/14/2022 Action  Given Dose  500 Units Route  InterCATHeter Administered By  Raji Rendon, RN              sodium chloride (NS) flush 10 mL       Admin Date  12/14/2022 Action  Given Dose  10 mL Route  IntraVENous Administered By  Raji Rendon, ADA                     Mr. Jonn Pandya  discharged from [place] [address] in stable condition at 1005. Port de-accessed, flushed & heparinized per protocol.  He is to return on     Future Appointments   Date Time Provider Dahlia Grissomi   12/20/2022  7:30 AM SS INF2 CH2 >7H [facility]   12/20/2022  8:15 AM Justina Blake NP [facility] BS AMB   12/22/2022  3:00 PM SS INF6 CH4 <1H [facility]   12/27/2022 11:30 AM Boothe, Cherie Olszewski, MD P[facility] BS AMB   1/3/2023  7:30 AM [facility]   1/5/2023  3:00 PM [facility]   1/11/2023  3:00 PM Abelardo Amador MD [facility] BS AMB   1/17/2023  7:30 AM SS [facility]   1/19/2023  3:00 PM [facility]

## 2022-12-14 NOTE — TELEPHONE ENCOUNTER
Called patient, HIPAA verified. Advised patient of RX for Potassium sent to his Pharmacy. Reviewed with patient why potassium was ordered, and how to take the potassium per RODY Blake instructions. Patient verbalized understanding, advised to let us or pharmacy know if he could not afford medications. Patient verbalized understanding, and thanked for call.

## 2022-12-14 NOTE — TELEPHONE ENCOUNTER
----- Message from Xiomara Rey NP sent at 12/14/2022 10:58 AM EST -----  Regarding: potassium rx  Please call pt and advise his potassium is low. I recommend he start on KCL 10 meq daily at home, but would like him to take 2 x 10 meq tabs for x 2 days, then go to one daily. I sent rx to his pharmacy.  Thank you

## 2022-12-15 DIAGNOSIS — R10.84 ABDOMINAL PAIN, GENERALIZED: ICD-10-CM

## 2022-12-15 DIAGNOSIS — C78.6 PERITONEAL CARCINOMATOSIS (HCC): ICD-10-CM

## 2022-12-15 DIAGNOSIS — C18.9 COLON ADENOCARCINOMA (HCC): ICD-10-CM

## 2022-12-15 NOTE — TELEPHONE ENCOUNTER
Triage for Controlled Substance Refill Request     Pain Diagnosis: _Colon adenocarcinoma ([facility] [address] (C18.9); Abdominal pain, generalized (R10.84); Peritoneal carcinomatosis ([facility] [address] (C78.6)     Last Outpatient Visit: _11/28/2022     Next Outpatient Visit: _12/27/2022     Reason for refill needed outside of office visit? Appointment not scheduled prior to need for scheduled refill        Pharmacy: _[facility]/pharmacy #[id number]      Medication:oxyCODONE IR (ROXICODONE) 10 mg tab immediate release tablet     Dose and directions: Take 1 Tablet by mouth every four (4) hours as needed for Pain for up to 15 days.   Number dispensed:90  Date filled ( or Pharmacy) 12/4/2022  # left:23         reviewed: _yes     Date of Urine Drug Screen:  _8/22/2022     Opioid Safety Handout given:  _yes     Appropriate for refill:  _yes     Action:  _ Medication pending

## 2022-12-15 NOTE — TELEPHONE ENCOUNTER
Received call from patient requesting Oxycodone 10 mg refill. Patient has 23 pills remaining. To be called in to [facility] pharmacy.

## 2022-12-16 ENCOUNTER — HOSPITAL ENCOUNTER (OUTPATIENT)
Dept: INFUSION THERAPY | Age: 47
End: 2022-12-16
Payer: MEDICAID

## 2022-12-16 RX ORDER — OXYCODONE HYDROCHLORIDE 10 MG/1
10 TABLET ORAL
Qty: 90 TABLET | Refills: 0 | Status: SHIPPED | OUTPATIENT
Start: 2022-12-19 | End: 2023-01-03

## 2022-12-18 NOTE — PROGRESS NOTES
[id number] [facility] Oncology at [address] [place] [address]  [phone]    Hematology / Oncology Established Visit    Reason for Visit:   Lee Ann Wilcox is a 41 y.o. male who in follow up of colon cancer. Hematology Oncology Treatment History:     Diagnosis #1: Follicular lymphoma  Stage: IV  Pathology:   11/13/18 right inguinal LN excision: Follicular lymphoma, high-grade (grade 3a of 3). Prior Treatment:   1. Obinutuzumab-CHOP. Obinutuzumab: 1000 mg weekly on days 1, 8, 15 for cycle 1, then 1000 mg on day 1 q21 days for cycles 2-6, then monotherapy 1000 mg every 21 days for cycle 7, 8 with Cyclophosphamide 750mg/m2, Doxorubicin 50mg/m2, Vincristine 1.4mg/m2 on day 1 and Prednisone 100mg on Days 1-5, every 21 days for a total of 2 cycles completed late 1/2019. Regimen discontinued due to STEMI. 2. Obinutuzumab + Bendamustine: 1000 mg Obinutuzumab on day 1 + Bendamustine 90mg/m2 on days 1-2 on a 28-day cycle x 4 cycles, completed 5/2019  Current Treatment: Surveillance      Diagnosis #2: Colon cancer:  Stage: IV  Pathology:  Ascending colon; biopsy: poorly differentiated carcinoma  Comment: No dysplasia is identified. Immunohistochemical stains performed on block 1A, show the tumor positive for Cam5.2 and shows patchy positivity for CDX2 with a Ki-67 index of -90%; the tumor is focally positive for CK5/6 and GATA3; negative for p63, Pax8, CK7, CK20, panmelanoma, synaptophysin and chromogranin. The immunophenotypic features are nonspecific but argues somewhat against the following carcinoma: urothelial, neuroendocrine and breast. The immunophenotypic features also argues against melanoma and somewhat against classic colorectal carcinoma. Additional immunohistochemical stains to exclude the possibility of prostate and hepatocellular carcinoma have been   ordered; the results will be reported as an addendum.   -MLH1/MSH2/MSH6/PMS2 all intact with no loss of nuclear expression of MMR proteins - no MSI   Addendum: The addendum is issued to report the results of additional immunohistochemical stains in an attempt to ascertain the primary site of the carcinoma. The diagnosis is unchanged. Hepar and glypican 3 immunohistochemical stains are neg, arguing against hepatocellular carcinoma. PSA stain is negative, arguing against prostatic primary. Imaging studies to eval for poss primary sites maybe useful. In the absence of carcinoma/tumor at any other sites, this may represent an undifferentiated carcinoma of the colon.  -Oncogenomics (molecular) studies: POLE< ARID1A, YVONNE, ATR, BRCA2, CHECK1, FANCI, NF1, PIK3CA, PTCH1, PTEN, RAD50, RAD51, RB1, SMARCA4, STK11    Prior Treatment:   1. Loop ileostomy 2/19/22  2. Diagnostic laparoscopy with peritoneal biopsy, 4/27/22    Current Treatment: FOLFOXIRI every 2 weeks until disease progression or toxicity, 5/16/22 - current      Oncologic History:  Was in his usual state of health in early November 2018 when he developed low back pain and presented to the ER. Work-up at outside hospital revealed a retroperitoneal mass seen on CT imaging, and he was transferred to [facility] for further work-up. CT there showed a large retroperitoneal mass encircling the aorta with invasion of the left renal hilum and left adrenal gland. There were bilateral inguinal lymph nodes and moderate left hydronephrosis. He was evaluated while at [facility] and was noted to have palpable nodes in his groin for approximately the past 1 month. He underwent excisional LN biopsy of right inguinal LN, which revealed follicular lymphoma. At time of diagnosis, he had no cardiac disease aside from HTN, hyperlipidemia. However, he did have an NSTEMI after cycle 2 of O-CHOP. Was likely unrelated to chemotherapy, but opted to switch treatment to Obinutuzumab-Bendamustine. He completed treatment in 5/2019 and had a CR based on PET.     History of Present Illness:   Mr. Jaime Hogan is a 41 y.o. male with is seen for follow up of colon cancer retry of C13 of treatment. Reports a new burning pain around his umbilicus and upper right quadrant of abdomen that started over the weekend. Describes pain as a burning sensation, worse with movement. Still able to eat and hydrate well. Has soft stool output in his colostomy bag daily. No constipation or watery stools. No fevers, chills, SOB, or chest pain. He is on chronic pain regimen with oxycontin 20mg ER every 12 hours and oxycodone 10 mg every 4 hours. PMHx: Lymphoma in 2018, CAD, HTN, Hyperlipidemia, Anxiety, chronic low back pain  PSurgHx: None aside from cardiac stent placement and port placement  SHx: Smokes 1/2ppd x past 20 yrs. Works part time as a cook. Has 2 children. FHx: Father had leukemia, passed away from pancreatic cancer. Review of Systems: A complete review of systems was obtained, negative except as described above. Physical Exam:     Visit Vitals  /84   Pulse 86   Temp 98 °F (36.7 °C)   Wt 139 lb (63 kg)   SpO2 98%   BMI 21.77 kg/m²       ECOG PS: 0  General: Well developed, no acute distress  Eyes: anicteric sclerae  HENT: Atraumatic  Neck: Supple  Lymphatic: deferred today   Respiratory: CTAB, normal respiratory effort  CV: Normal rate, regular rhythm, no murmurs, no peripheral edema  GI: Tenderness to palpation of umbilicus and RUQ. Colostomy in right lower quadrant with distention due to parastomal hernia  MS: Normal gait and station. Digits without clubbing or cyanosis. Skin: No rashes, ecchymoses, or petechiae. Normal temperature, turgor, and texture. Neuro/Psych: Alert, oriented. Appropriate affect, normal judgment/insight. Results:     Lab Results   Component Value Date/Time    WBC 4.9 12/20/2022 07:54 AM    HGB 11.2 (L) 12/20/2022 07:54 AM    HCT 33.7 (L) 12/20/2022 07:54 AM    PLATELET 325 71/29/3653 07:54 AM    .3 (H) 12/20/2022 07:54 AM    ABS.  NEUTROPHILS 2.6 12/20/2022 07:54 AM    Hemoglobin (POC) 15.0 06/05/2009 02:13 PM Hematocrit (POC) 39 2019 01:24 PM     Lab Results   Component Value Date/Time    Sodium 140 2022 07:54 AM    Potassium 3.8 2022 07:54 AM    Chloride 106 2022 07:54 AM    CO2 26 2022 07:54 AM    Glucose 133 (H) 2022 07:54 AM    BUN 7 2022 07:54 AM    Creatinine 0.98 2022 07:54 AM    GFR est AA >60 10/03/2022 07:50 AM    GFR est non-AA >60 10/03/2022 07:50 AM    Calcium 9.3 2022 07:54 AM    Sodium (POC) 136 2019 01:24 PM    Potassium (POC) 3.9 2019 01:24 PM    Chloride (POC) 102 2019 01:24 PM    Glucose (POC) 249 (H) 02/15/2019 10:21 PM    BUN (POC) 14 2019 01:24 PM    Creatinine (POC) 0.9 2019 01:24 PM    Calcium, ionized (POC) 1.24 2019 01:24 PM     Lab Results   Component Value Date/Time    Bilirubin, total 0.4 2022 07:54 AM    ALT (SGPT) 24 2022 07:54 AM    Alk. phosphatase 396 (H) 2022 07:54 AM    Protein, total 7.0 2022 07:54 AM    Albumin 3.0 (L) 2022 07:54 AM    Globulin 4.0 2022 07:54 AM     Lab Results   Component Value Date/Time    Iron 18 (L) 2022 11:13 AM    TIBC 173 (L) 2022 11:13 AM    Iron % saturation 10 (L) 2022 11:13 AM    Ferritin 426 (H) 2022 11:13 AM       No results found for: B12LT, FOL, RBCF  Lab Results   Component Value Date/Time    TSH 1.53 2016 04:40 AM       18:       Labs at [facility]:  22: CA 19-9 = 390  22: CEA = 12.3  5/16/22: CEA = 19.2  22: CEA = 17.9   22: CEA = 6.0    Signatera MRD:  22: 295.97 MTM/mL  22: 2.98  22: 0.88   10/24/22: 37.87    Imagin/9/18 Abd/pelvis CT: IMPRESSION:  1. Interval development of a large retroperitoneal mass encircling the aorta with invasion of the left renal hilum and left adrenal gland. Several adjacent lymph nodes are seen extending into the peritoneum and underneath the  diaphragmatic natalie. This most likely represents lymphoma.   2. Several new bilateral enlarged inguinal lymph nodes also likely representing lymphoma. 3. Moderate left hydronephrosis with a delayed renal nephrogram related to decreased renal function. This is related to the invasion of the renal hilum. 11/11/18 Chest CT: IMPRESSION:  Trace left pleural effusion. Bilateral lower lobe atelectasis. Large  retroperitoneal mass lesion again demonstrated. PET 12/18/18:  FINDINGS:  HEAD/NECK: Right palatine tonsil intense hypermetabolism, max SUV 18. Multilevel  bilateral cervical adenopathy, with max SUV 12 in a left supraclavicular node. Cerebral evaluation is limited by normal intense activity. CHEST: Solitary hypermetabolic left axillary node, max SUV 11. ABDOMEN/PELVIS: Bulky retroperitoneal mass max SUV 27, with several additional  small active abdomino-pelvic nodes. Bilateral inguinal nodes with max SUV 12 on  the left. SKELETON: No foci of abnormal hypermetabolism in the axial and visualized  appendicular skeleton. IMPRESSION:   1. Right palatine tonsil tumor involvement (Deauville 5). 2. Bilateral cervical delaney involvement (Deauville 5). 3. Left axillary node involvement (Deauville 5). 4. Bulky retroperitoneal lymphoma mass and additional smaller hypermetabolic  abdomino-pelvic nodes (Deauville 5). 5. Bilateral inguinal delaney involvement (Deauville 5). Deauville Five Point Scale  1. No uptake or no residual uptake (when used interim)  2. Slight uptake, but below blood pool (mediastinum)  3. Uptake above mediastinal, but below/equal to uptake in the liver  4. Uptake slightly to moderately higher than liver  5. Markedly increased uptake or any new lesion (on response evaluation)  Each FDG-avid (or previously FDG avid) lesion is rated independently. Reference values:  Mediastinal blood pool: 2.1 SUV  Liver (background): 2.2 SUV    PET/CT 2/05/19:   IMPRESSION:  1. No Foci of Abnormal Hypermetabolism (Deauville 1).   2. Resolved activity in the right palatine tonsil, bilateral cervical nodes,left axillary node, retroperitoneal/abdominal pelvic adenopathy, bilateral inguinal nodes. Echo 2/14/19:  Normal cavity size, wall thickness and systolic function (ejection fraction normal). The muscle mass is normal. The cavity shape is normal. The estimated ejection fraction is 41 - 45%. Abnormal wall motion as described on the wall scoring diagram below. End-systolic volume is normal. Normal left ventricular strain. There is mild (grade 1) left ventricular diastolic dysfunction. Normal left ventricular diastolic pressure. End-diastolic volume is normal.    LE arterial duplex 2/22/19:  There is evidence of left groin pseudoaneurysm noted arising from distal common femoral artery, pseudo lobe measures 2.32cm x 2.58cm and pseudo neck length measuring 0.63cm. There is no evidence of hemodynamically significant left lower extremity arterial obstruction. JACLYN is 1.03 on the right and 1.02 on the left. LE arterial duplex s/p Thrombin Injection to Pseudoaneurysm 2/26/19:  Successful thrombin injection procedure of the left groin with no further flow seen. No evidence of hemodnyamically significant obstruction in the left lower extremity. Left lower extremity arterial duplex performed. Confirmed pseudoaneurysm in left groin with small neck. Following thrombin injection, no further flow seen in the pseudoaneurysm. The left common femoral, profunda femoral, femoral, popliteal, posterior tibial and anterior arteries were imaged. Mainly triphasic flow was seen with no evidence of significantly elevated velocities. Repeat LE arterial duplex 2/27/19:  Continued thrombosed left groin pseudoaneurysm following thrombin injection on 02/26/2019. No flow or color fill is identified. The hematoma measures approximately 2.1 x 2.9 cm in diameter.  The common femoral, deep femoral, femoral, and popliteal arteries are patent with mainly tri-phasic flow and no significant hemodynamically obstruction is noted. Stress 5/31/19:  Normal stress myocardial perfusion without ischemia or infact at 84% MPHR. Normal LV function. LVEF 60%. No EKG changes of ischemia at peak exercise. Normal functional capacity. PET 6/03/19: IMPRESSION: No Foci of Abnormal Hypermetabolism (Deauville 1). -MRI lumbar spine 12/18/19:  Mild disc degenerative change at L3-4 and L4-5. Mild canal stenosis at L3-4 and mild left foraminal stenosis at L4-5. Other less severe degenerative findings are as described above. Continued diminished size of retroperitoneal mass-adenopathy,  with diminished soft tissue density at the left renal hilum. -CT chest/abdomen 12/16/19:  Findings are consistent with interval response to therapy    CT c/a/p 5/28/20: IMPRESSION:  Further contraction of the retroperitoneal mantle and chris mesentery,  compatible with treatment response  Stable left hilar soft tissue mass  Slight increased splaying of the duodenum and proximal jejunum is the result, without obstruction  No evidence for recurrence of lymphoma in the chest, abdomen, or pelvis    CT c/a/p 2/13/22:  FINDINGS:   LOWER THORAX: Dependent atelectasis with otherwise clear lungs. The visualized  heart is normal in size without pericardial effusion. LIVER: No mass. BILIARY TREE: Gallbladder is unremarkable. CBD is not dilated. SPLEEN: Unremarkable. PANCREAS: No mass or ductal dilatation. ADRENALS: Unremarkable. KIDNEYS: Atrophic left kidney with mild left hydronephrosis. Right kidney is  unremarkable with no stone, enhancing mass, or other renal abnormality. STOMACH: Unremarkable. SMALL BOWEL: No dilatation or wall thickening. COLON: Circumferential wall thickening at the hepatic flexure with luminal  narrowing, adjacent mesenteric stranding, and fluid with upstream retention in  the cecum and fecalization of distal ileal contents. No dilation or other wall  thickening. APPENDIX: Unremarkable.   PERITONEUM: Moderate free fluid with no pneumoperitoneum. RETROPERITONEUM: Soft tissue stranding around the celiac and SMA and associated aorta with no discrete mass or adenopathy. Aorta is normal in size without aneurysm or dissection. REPRODUCTIVE ORGANS: Prostate and seminal vesicles appear unremarkable. URINARY BLADDER: No mass or calculus. BONES: No destructive bone lesion. ABDOMINAL WALL: No mass or hernia. ADDITIONAL COMMENTS: N/A  IMPRESSION  1. Annular mass lesion of the right colon with upstream fecal retention  concerning for primary colon neoplasm versus less likely lymphoma. 2. Soft tissue stranding around celiac axis, SMA, and abdominal aorta may  reflect infiltrative lymphoma. 3. Left renal atrophy with left hydronephrosis likely reflecting chronic  proximal ureteral obstruction. 4. Incidentals as above. 2/24/22 CT abd/pelvis at [facility]:  FINDINGS: An enteric tube with sidehole beyond the level of the lower esophageal sphincter is seen looping on itself in the proximal stomach before terminating in the mid stomach. Status post right lower quadrant diverting ileostomy for an obstructing colonic mass. Multiple loops of gas dilated small bowel remain, with a maximal diameter of 5.4 cm whereas previously measured 3.6 cm. Dilute contrast is seen within the lateral left abdominal dilated small bowel. Moderate stool burden and bowel gas opacifies the cecum, measuring 7.3 cm in diameter. No definite supine evidence of pneumoperitoneum. Lung bases: Limited evaluation of the lung bases demonstrates a cardiac silhouette within normal limits for size. A small bore central venous catheter is seen terminating in the right atrium. No focal consolidation or pleural effusion. 2/24/22 CT abd/pelvis [facility]:  IMPRESSION:  1. Status post right lower quadrant loop ileostomy.  Mild diffuse dilation of small bowel proximal to the ostomy in the lower abdomen and upper pelvis concerning for at least mild to moderate partial bowel obstruction. Relatively decompressed loop ileostomy. 2. Mildly complex pelvic fluid collection in the rectovesical space, decreased in size from prior. 3. Redemonstrated ascending colon mass and findings suspicious for regional metastatic disease. 4. Redemonstrated soft tissue in the retroperitoneum with prominent lymph nodes, similar to prior examination. Differential would include metastasis, retroperitoneal fibrosis. 5. Mild atherosclerosis. 6. Subcentimeter right renal hypodensity, too small to characterize. 7. Severe compression of the left renal vein, similar to prior examination. 8. Additional findings as described above. Bones: No acute osseous abnormality. 2/24/22 CT chest [facility]:  IMPRESSION:  FINAL report. 1. No evidence of metastatic disease to the chest.  2. No enlarged lymph nodes. 3. Increasing volume loss in the lung bases which may be due to atelectasis. 4. CT abdomen and pelvis reported separately. 2/25/22 Colonoscopy at [facility]:  Impression:            - Preparation of the colon was inadequate. - Stool in the entire examined colon. - Likely malignant completely obstructing tumor at the                         hepatic flexure. Biopsied.                        - Malignant-appearing tumor in the colon. Complications:         No immediate complications. 7/19/22 CT ch/abd/pelv:  IMPRESSION  Response to treatment characterized by decrease in size of the apical core  lesion in the proximal transverse colon and decreased mucosal colic  lymphadenopathy. Persistent mesenteric lymphadenopathy and retroperitoneal/mesenteric soft tissue  which may relate to the patient's history of lymphoma. Chronic left renal atrophy with chronic left hydronephrosis. RECIST:  Target lesions:  Transverse colon mass, series 2, image 75, 27 x 26 mm, previously 49 x 45 mm. Nontarget lesions:  Transverse mesocolic lymph nodes, decreased.     10/10/22 CT ch/abd/pelv:  IMPRESSION  Increase in peritoneal disease compared to the prior examination. Stable  mesenteric infiltrate. Improvement in the mass lesion involving the transverse  colon. RECIST:  Target lesion:  Transverse colon mass, series 2, image 76, 1.7 x 1.3 cm, previously 2.7 x 2.6cm. Nontarget lesions:   Transverse mesial colic lymph nodes unchanged. Assessment & Plan:   Yamil Jarrell is a 41 y.o. male comes in for evaluation and management of lymphoma. 1. Undifferentiated carcinoma of transverse colon, metastatic, KRAS/NRAS status unclear:  S/p diverting ileostomy due to large bowel obstruction. Colonoscopy with biopsy on 2/25/22. No obvious metastatic disease on CT imaging, but did have obvious metastatic disease to peritoneum during laparoscopy with Dr. Mireya Cade. I recommended FOLFOXIRI every 2 weeks. Will not give Bevacizumab given h/o MI and tobacco use. [employer] suggests: BRCA2 and YVONNE mutations support use of Olaparib or similar PARP inhibitor in future. They also suggest testing for TMB, PDL1 to determine utility of checkpoint inhibitors. CT after C9 showed good response with decrease in size of colon mass on 10/10/22. Supportive medications: zofran, compazine, dexamethasone, EMLA topical  -- Proceed with C13 FOLFOXIRI, will receive Pegfilgrastin with pump d/c.  Dose-reduced Irinotecan by 10% to avoid cytopenias/treatment delays. -- Repeat Signatera on 12/13/22. Looking into adding [employer] or alternate NGS testing to determine TMB, MSI, PDL1, KRAS/NRAS/BRAF. -- Check CEA every 8 weeks  -- Consider repeat colonoscopy in 4-6 months given incomplete colonoscopy. -- Dex decreased to 4mg on D2,3 given increased anxiety those days and dex premed decreased 8mg.   -- CT after every 4 cycles, due now. Advised he schedule it as soon as possible given new burning sensation in abdomen and parastomal hernia. -- Return in 2 weeks for C14 of FOLFOXIRI.     2. H/o Follicular lymphoma: Grade 3a with bulky disease encircling the aorta, causing pain. Bone marrow negative. BR better than RCHOP, but based on GALLIUM study, Obinutuzumab-based induction and maintenance prolongs PFS over that seen with rituximab-based therapy. Therefore, pt started on O-CHOP regimen. Pt achieved CR after C2, but was switched to BR x 4 cycles given STEMI. Completed treatment 5/2019. End of treatment PET 6/3/19 showed CR. No extra surveillance needed at this time given metastatic colon cancer with frequent imaging. Current imaging shows slight increase in soft tissue density in deep pelvis on 10/2022. Will continue to monitor for colon cancer follow up imaging. 3. Chronic lumbar back pain / anxiety / RLQ / acute right upper quadrant and umbilical pain:   Left lower back pain is chronic/stable. RLQ is likely related to neoplasm/colostomy/parastomal hernia. Discussed acute RUQ burning pain could be due to parastomal hernia. Pain moderately managed on Oxycontin 10mg q12h, oxycodone 10mg q4h prn. Following with Dr. Piyush Penn.   -- Had follow up with Dr. Supriya Olivas regarding parastomal hernia, pain but no showed to appt due to ride. Advised he reschedule this week due to burning pain. -- KUB abdomen today - call with results. -- Lidocaine patches q12h #10 rx to pharmacy. 4. CAD / HTN:   h/o STEMI 2/14/19 with TOM placed to proximal LAD. Following with Dr. Suad Trejo and remains on dual antiplatelet therapy, high dose Lipitor, Metoprolol, ACEI. Received only 2 doses of cardiotoxic chemo, Doxorubicin in early 1/2019. Is overdue for follow up with Dr. Suad Trejo.   -- Not currently taking Metoprolol or ACEI. Referred back to Dr. Suad Trejo since he is overdue for follow up. Scheduled 1/11/23.     5. Tobacco abuse:   Previously discussed smoking cessation strategies and benefits. Pt has tried quitting in the past and is not interested. 6. BRCA2 mutation:  Pt would benefit from genetic counseling for himself and his family.     7. Chemotherapy induced neutropenia:  Intermittent. Neulasta on-body added with C3-4, but discontinued due to insurance denial. Molinda Hill added with pump removal starting with C13. Given patient is unable to return the next day for Molinda Hill, will proceed with giving it on the day of pump removal.     The prescribing information recommends not administering in the period between 14 days before and 24 hours after administration of cytotoxic chemotherapy; however, while same-day pegfilgrastim may not be as effective as when administered >24 hours after cytotoxic chemotherapy, there are reports of administering pegfilgrastim on the same day following cytotoxic chemotherapy, particularly when patients are unable to return the following day (ASCO [Lewis 2015], Sammi 2015; Eckstrom 2019). Goals of care: Disease is not curable, but is treatable to improve quality and duration of life. I personally saw and evaluated the patient and performed the key components of medical decision making. The history, physical exam, and documentation were performed by Marianela Key NP. I reviewed and verified the above documentation and modified it as needed. He is tolerating systemic therapy with manageable toxicity, so we will proceed with treatment.         Signed By: Cedric Newell MD

## 2022-12-19 ENCOUNTER — APPOINTMENT (OUTPATIENT)
Dept: INFUSION THERAPY | Age: 47
End: 2022-12-19

## 2022-12-20 ENCOUNTER — TELEPHONE (OUTPATIENT)
Dept: ONCOLOGY | Age: 47
End: 2022-12-20

## 2022-12-20 ENCOUNTER — OFFICE VISIT (OUTPATIENT)
Dept: ONCOLOGY | Age: 47
End: 2022-12-20

## 2022-12-20 ENCOUNTER — HOSPITAL ENCOUNTER (OUTPATIENT)
Dept: INFUSION THERAPY | Age: 47
Discharge: HOME | End: 2022-12-20
Payer: MEDICAID

## 2022-12-20 VITALS
DIASTOLIC BLOOD PRESSURE: 84 MMHG | BODY MASS INDEX: 21.9 KG/M2 | OXYGEN SATURATION: 98 % | HEIGHT: 67 IN | WEIGHT: 139.5 LBS | TEMPERATURE: 97.8 F | SYSTOLIC BLOOD PRESSURE: 118 MMHG | HEART RATE: 86 BPM | RESPIRATION RATE: 16 BRPM

## 2022-12-20 VITALS
HEART RATE: 86 BPM | BODY MASS INDEX: 21.77 KG/M2 | OXYGEN SATURATION: 98 % | TEMPERATURE: 98 F | WEIGHT: 139 LBS | SYSTOLIC BLOOD PRESSURE: 118 MMHG | DIASTOLIC BLOOD PRESSURE: 84 MMHG

## 2022-12-20 DIAGNOSIS — D70.1 CHEMOTHERAPY INDUCED NEUTROPENIA (HCC): Primary | ICD-10-CM

## 2022-12-20 DIAGNOSIS — C18.9 COLON ADENOCARCINOMA (HCC): ICD-10-CM

## 2022-12-20 DIAGNOSIS — Z51.11 CHEMOTHERAPY MANAGEMENT, ENCOUNTER FOR: ICD-10-CM

## 2022-12-20 DIAGNOSIS — R10.11 RUQ ABDOMINAL PAIN: ICD-10-CM

## 2022-12-20 DIAGNOSIS — Z76.89 PREVENTION OF CHEMOTHERAPY-INDUCED NEUTROPENIA: ICD-10-CM

## 2022-12-20 DIAGNOSIS — K43.5 PARASTOMAL HERNIA WITHOUT OBSTRUCTION OR GANGRENE: ICD-10-CM

## 2022-12-20 DIAGNOSIS — T45.1X5A CHEMOTHERAPY INDUCED NEUTROPENIA (HCC): Primary | ICD-10-CM

## 2022-12-20 DIAGNOSIS — C82.90 FOLLICULAR LYMPHOMA, UNSPECIFIED FOLLICULAR LYMPHOMA TYPE, UNSPECIFIED BODY REGION (HCC): ICD-10-CM

## 2022-12-20 DIAGNOSIS — C18.4 CARCINOMA OF TRANSVERSE COLON (HCC): Primary | ICD-10-CM

## 2022-12-20 LAB
ALBUMIN SERPL-MCNC: 3 G/DL (ref 3.5–5)
ALBUMIN/GLOB SERPL: 0.8 {RATIO} (ref 1.1–2.2)
ALP SERPL-CCNC: 396 U/L (ref 45–117)
ALT SERPL-CCNC: 24 U/L (ref 12–78)
ANION GAP SERPL CALC-SCNC: 8 MMOL/L (ref 5–15)
AST SERPL-CCNC: 25 U/L (ref 15–37)
BASOPHILS # BLD: 0.1 K/UL (ref 0–0.1)
BASOPHILS NFR BLD: 1 % (ref 0–1)
BILIRUB SERPL-MCNC: 0.4 MG/DL (ref 0.2–1)
BUN SERPL-MCNC: 7 MG/DL (ref 6–20)
BUN/CREAT SERPL: 7 (ref 12–20)
CALCIUM SERPL-MCNC: 9.3 MG/DL (ref 8.5–10.1)
CHLORIDE SERPL-SCNC: 106 MMOL/L (ref 97–108)
CO2 SERPL-SCNC: 26 MMOL/L (ref 21–32)
CREAT SERPL-MCNC: 0.98 MG/DL (ref 0.7–1.3)
DIFFERENTIAL METHOD BLD: ABNORMAL
EOSINOPHIL # BLD: 0.3 K/UL (ref 0–0.4)
EOSINOPHIL NFR BLD: 6 % (ref 0–7)
ERYTHROCYTE [DISTWIDTH] IN BLOOD BY AUTOMATED COUNT: 14.7 % (ref 11.5–14.5)
GLOBULIN SER CALC-MCNC: 4 G/DL (ref 2–4)
GLUCOSE SERPL-MCNC: 133 MG/DL (ref 65–100)
HCT VFR BLD AUTO: 33.7 % (ref 36.6–50.3)
HGB BLD-MCNC: 11.2 G/DL (ref 12.1–17)
IMM GRANULOCYTES # BLD AUTO: 0 K/UL (ref 0–0.04)
IMM GRANULOCYTES NFR BLD AUTO: 0 % (ref 0–0.5)
LYMPHOCYTES # BLD: 1.2 K/UL (ref 0.8–3.5)
LYMPHOCYTES NFR BLD: 25 % (ref 12–49)
MCH RBC QN AUTO: 33.3 PG (ref 26–34)
MCHC RBC AUTO-ENTMCNC: 33.2 G/DL (ref 30–36.5)
MCV RBC AUTO: 100.3 FL (ref 80–99)
MONOCYTES # BLD: 0.7 K/UL (ref 0–1)
MONOCYTES NFR BLD: 14 % (ref 5–13)
NEUTS SEG # BLD: 2.6 K/UL (ref 1.8–8)
NEUTS SEG NFR BLD: 54 % (ref 32–75)
NRBC # BLD: 0 K/UL (ref 0–0.01)
NRBC BLD-RTO: 0 PER 100 WBC
PLATELET # BLD AUTO: 155 K/UL (ref 150–400)
PMV BLD AUTO: 10.4 FL (ref 8.9–12.9)
POTASSIUM SERPL-SCNC: 3.8 MMOL/L (ref 3.5–5.1)
PROT SERPL-MCNC: 7 G/DL (ref 6.4–8.2)
RBC # BLD AUTO: 3.36 M/UL (ref 4.1–5.7)
SODIUM SERPL-SCNC: 140 MMOL/L (ref 136–145)
WBC # BLD AUTO: 4.9 K/UL (ref 4.1–11.1)

## 2022-12-20 PROCEDURE — 96416 CHEMO PROLONG INFUSE W/PUMP: CPT

## 2022-12-20 PROCEDURE — 80053 COMPREHEN METABOLIC PANEL: CPT

## 2022-12-20 PROCEDURE — 96375 TX/PRO/DX INJ NEW DRUG ADDON: CPT

## 2022-12-20 PROCEDURE — 77030016057 HC NDL HUBR APOL -B

## 2022-12-20 PROCEDURE — 74011000258 HC RX REV CODE- 258: Performed by: NURSE PRACTITIONER

## 2022-12-20 PROCEDURE — 96413 CHEMO IV INFUSION 1 HR: CPT

## 2022-12-20 PROCEDURE — 3078F DIAST BP <80 MM HG: CPT | Performed by: INTERNAL MEDICINE

## 2022-12-20 PROCEDURE — 96415 CHEMO IV INFUSION ADDL HR: CPT

## 2022-12-20 PROCEDURE — 96417 CHEMO IV INFUS EACH ADDL SEQ: CPT

## 2022-12-20 PROCEDURE — 74011250636 HC RX REV CODE- 250/636: Performed by: NURSE PRACTITIONER

## 2022-12-20 PROCEDURE — 85025 COMPLETE CBC W/AUTO DIFF WBC: CPT

## 2022-12-20 PROCEDURE — 99215 OFFICE O/P EST HI 40 MIN: CPT | Performed by: INTERNAL MEDICINE

## 2022-12-20 PROCEDURE — 36415 COLL VENOUS BLD VENIPUNCTURE: CPT

## 2022-12-20 PROCEDURE — 3074F SYST BP LT 130 MM HG: CPT | Performed by: INTERNAL MEDICINE

## 2022-12-20 PROCEDURE — 74011000250 HC RX REV CODE- 250: Performed by: NURSE PRACTITIONER

## 2022-12-20 PROCEDURE — 96368 THER/DIAG CONCURRENT INF: CPT

## 2022-12-20 RX ORDER — DEXTROSE MONOHYDRATE 50 MG/ML
25 INJECTION, SOLUTION INTRAVENOUS CONTINUOUS
Status: DISPENSED | OUTPATIENT
Start: 2022-12-20 | End: 2022-12-20

## 2022-12-20 RX ORDER — LIDOCAINE 50 MG/G
1 PATCH TOPICAL EVERY 24 HOURS
Qty: 10 EACH | Refills: 1 | Status: SHIPPED | OUTPATIENT
Start: 2022-12-20

## 2022-12-20 RX ORDER — DEXAMETHASONE SODIUM PHOSPHATE 4 MG/ML
8 INJECTION, SOLUTION INTRA-ARTICULAR; INTRALESIONAL; INTRAMUSCULAR; INTRAVENOUS; SOFT TISSUE ONCE
Status: COMPLETED | OUTPATIENT
Start: 2022-12-20 | End: 2022-12-20

## 2022-12-20 RX ORDER — PALONOSETRON 0.05 MG/ML
0.25 INJECTION, SOLUTION INTRAVENOUS ONCE
Status: COMPLETED | OUTPATIENT
Start: 2022-12-20 | End: 2022-12-20

## 2022-12-20 RX ORDER — SODIUM CHLORIDE 9 MG/ML
25 INJECTION, SOLUTION INTRAVENOUS CONTINUOUS
Status: DISCONTINUED | OUTPATIENT
Start: 2022-12-20 | End: 2022-12-22 | Stop reason: HOSPADM

## 2022-12-20 RX ADMIN — OXALIPLATIN 144.5 MG: 5 INJECTION, SOLUTION, CONCENTRATE INTRAVENOUS at 12:02

## 2022-12-20 RX ADMIN — DEXTROSE MONOHYDRATE 252 MG: 5 INJECTION, SOLUTION INTRAVENOUS at 10:26

## 2022-12-20 RX ADMIN — DEXTROSE MONOHYDRATE 25 ML/HR: 50 INJECTION, SOLUTION INTRAVENOUS at 09:04

## 2022-12-20 RX ADMIN — PALONOSETRON 0.25 MG: 0.05 INJECTION, SOLUTION INTRAVENOUS at 09:04

## 2022-12-20 RX ADMIN — DEXAMETHASONE SODIUM PHOSPHATE 8 MG: 4 INJECTION INTRA-ARTICULAR; INTRALESIONAL; INTRAMUSCULAR; INTRAVENOUS; SOFT TISSUE at 09:13

## 2022-12-20 RX ADMIN — FLUOROURACIL 4080 MG: 50 INJECTION, SOLUTION INTRAVENOUS at 14:21

## 2022-12-20 RX ADMIN — LEUCOVORIN CALCIUM 680 MG: 350 INJECTION, POWDER, LYOPHILIZED, FOR SUSPENSION INTRAMUSCULAR; INTRAVENOUS at 12:02

## 2022-12-20 NOTE — TELEPHONE ENCOUNTER
[address] at [facility]  [phone]    12/20/22 9:12 AM - [employer] pick-up scheduled for patient's Signatera testing. Tracking #[id number]. Confirmation #[id number].

## 2022-12-20 NOTE — PROGRESS NOTES
[facility] Progress Note    Date: 2022    Name: Nic Causey. MRN: [id number]         : 1975    Mr. Jonn Pandya Arrived ambulatory and in no distress for C13D1 of Folfirinox Regimen. Assessment was completed, patient reports generalized pain. Right chest wall port accessed without difficulty, labs drawn & sent for processing. Assessment and access performed by [facility]. Chemotherapy Flowsheet 2022   Cycle C13D1   Date 2022   Drug / Regimen Folfoxiri   Post Hydration -   Pre Meds -   Notes -       Patient proceed to appointment with Dr. Karlie Shell. Mr. Ewa Salinas vitals were reviewed. Visit Vitals  /84   Pulse 86   Temp 97.8 °F (36.6 °C)   Resp 16   Ht 5' 7\" (1.702 m)   Wt 63.3 kg (139 lb 8 oz)   SpO2 98%   BMI 21.85 kg/m²       Lab results were obtained and reviewed. Recent Results (from the past 12 hour(s))   CBC WITH AUTOMATED DIFF    Collection Time: 22  7:54 AM   Result Value Ref Range    WBC 4.9 4.1 - 11.1 K/uL    RBC 3.36 (L) 4.10 - 5.70 M/uL    HGB 11.2 (L) 12.1 - 17.0 g/dL    HCT 33.7 (L) 36.6 - 50.3 %    .3 (H) 80.0 - 99.0 FL    MCH 33.3 26.0 - 34.0 PG    MCHC 33.2 30.0 - 36.5 g/dL    RDW 14.7 (H) 11.5 - 14.5 %    PLATELET 727 054 - 211 K/uL    MPV 10.4 8.9 - 12.9 FL    NRBC 0.0 0  WBC    ABSOLUTE NRBC 0.00 0.00 - 0.01 K/uL    NEUTROPHILS 54 32 - 75 %    LYMPHOCYTES 25 12 - 49 %    MONOCYTES 14 (H) 5 - 13 %    EOSINOPHILS 6 0 - 7 %    BASOPHILS 1 0 - 1 %    IMMATURE GRANULOCYTES 0 0.0 - 0.5 %    ABS. NEUTROPHILS 2.6 1.8 - 8.0 K/UL    ABS. LYMPHOCYTES 1.2 0.8 - 3.5 K/UL    ABS. MONOCYTES 0.7 0.0 - 1.0 K/UL    ABS. EOSINOPHILS 0.3 0.0 - 0.4 K/UL    ABS. BASOPHILS 0.1 0.0 - 0.1 K/UL    ABS. IMM.  GRANS. 0.0 0.00 - 0.04 K/UL    DF AUTOMATED     METABOLIC PANEL, COMPREHENSIVE    Collection Time: 22  7:54 AM   Result Value Ref Range    Sodium 140 136 - 145 mmol/L    Potassium 3.8 3.5 - 5.1 mmol/L    Chloride 106 97 - 108 mmol/L    CO2 26 21 - 32 mmol/L    Anion gap 8 5 - 15 mmol/L    Glucose 133 (H) 65 - 100 mg/dL    BUN 7 6 - 20 MG/DL    Creatinine 0.98 0.70 - 1.30 MG/DL    BUN/Creatinine ratio 7 (L) 12 - 20      eGFR >60 >60 ml/min/1.73m2    Calcium 9.3 8.5 - 10.1 MG/DL    Bilirubin, total 0.4 0.2 - 1.0 MG/DL    ALT (SGPT) 24 12 - 78 U/L    AST (SGOT) 25 15 - 37 U/L    Alk.  phosphatase 396 (H) 45 - 117 U/L    Protein, total 7.0 6.4 - 8.2 g/dL    Albumin 3.0 (L) 3.5 - 5.0 g/dL    Globulin 4.0 2.0 - 4.0 g/dL    A-G Ratio 0.8 (L) 1.1 - 2.2         Medications:  Medications Administered       dexamethasone (DECADRON) 4 mg/mL injection 8 mg       Admin Date  12/20/2022 Action  Given Dose  8 mg Route  IntraVENous Administered By  Rabia Burr RN              dextrose 5% infusion       Admin Date  12/20/2022 Action  New Bag Dose  25 mL/hr Rate  25 mL/hr Route  IntraVENous Administered By  Rabia Burr RN              fluorouraciL (ADRUCIL) 4,080 mg in 0.9% sodium chloride 100 mL CADD Cassette       Admin Date  12/20/2022 Action  New Bag Dose  4,080 mg Rate  2.1 mL/hr Route  IntraVENous Administered By  Rabia Burr RN              irinotecan (CAMPTOSAR) 252 mg in dextrose 5% 250 mL, overfill volume 25 mL chemo infusion       Admin Date  12/20/2022 Action  New Bag Dose  252 mg Rate  191.7 mL/hr Route  IntraVENous Administered By  Rabia Burr RN              leucovorin (WELLCOVORIN) 680 mg in dextrose 5% 250 mL, overfill volume 25 mL IVPB       Admin Date  12/20/2022 Action  New Bag Dose  680 mg Rate  154.5 mL/hr Route  IntraVENous Administered By  Rabia Burr RN              oxaliplatin (ELOXATIN) 144.5 mg in dextrose 5% 250 mL, overfill volume 25 mL chemo infusion       Admin Date  12/20/2022 Action  New Bag Dose  144.5 mg Rate  152 mL/hr Route  IntraVENous Administered By  Rabia Burr RN              palonosetron HCl (ALOXI) injection 0.25 mg       Admin Date  12/20/2022 Action  Given Dose  0.25 mg Route  IntraVENous Administered By  Anastasia Rene RN                    Two nurses verified prior to administering: Drug name, drug dose, infusion volume or drug volume when prepared in a syringe, rate of administration, route of administration,  expiration dates and/or times, appearance and physical integrity of the drugs, rate set on infusion pump, when used sequencing of drug administration. Mr. Prateek Turner tolerated treatment well and was discharged from [place] [address] in stable condition. Port  remains accessed and attached to 5FU CADD pump. He is aware of future appointments.     Future Appointments   Date Time Provider Dahlia Epps   12/22/2022  3:00 PM SS INF6 CH4 <1H [facility]   12/27/2022 11:30 AM Talon Brand MD [facility] BS AMB   1/3/2023  7:30 AM [facility]   1/3/2023  8:45 AM Sotero Zheng MD [facility] BS AMB   1/5/2023  3:00 PM SS INF6 CH4 <1H [facility]   1/11/2023  3:00 PM Catalina Amador MD [facility] BS AMB   1/17/2023  7:30 AM SS INF4 CH2 >7H [facility]   1/19/2023  3:00 PM SS INF6 CH4 <1H [facility] Lidia Irene RN  December 20, 2022

## 2022-12-21 ENCOUNTER — HOSPITAL ENCOUNTER (OUTPATIENT)
Dept: GENERAL RADIOLOGY | Age: 47
Discharge: HOME | End: 2022-12-21
Attending: NURSE PRACTITIONER

## 2022-12-21 ENCOUNTER — APPOINTMENT (OUTPATIENT)
Dept: INFUSION THERAPY | Age: 47
End: 2022-12-21

## 2022-12-22 ENCOUNTER — HOSPITAL ENCOUNTER (OUTPATIENT)
Dept: INFUSION THERAPY | Age: 47
Discharge: HOME | End: 2022-12-22
Payer: MEDICAID

## 2022-12-22 VITALS
DIASTOLIC BLOOD PRESSURE: 87 MMHG | SYSTOLIC BLOOD PRESSURE: 134 MMHG | HEART RATE: 94 BPM | RESPIRATION RATE: 16 BRPM | TEMPERATURE: 97.7 F

## 2022-12-22 DIAGNOSIS — Z76.89 PREVENTION OF CHEMOTHERAPY-INDUCED NEUTROPENIA: ICD-10-CM

## 2022-12-22 DIAGNOSIS — D70.1 CHEMOTHERAPY INDUCED NEUTROPENIA (HCC): Primary | ICD-10-CM

## 2022-12-22 DIAGNOSIS — C82.90 FOLLICULAR LYMPHOMA, UNSPECIFIED FOLLICULAR LYMPHOMA TYPE, UNSPECIFIED BODY REGION (HCC): ICD-10-CM

## 2022-12-22 DIAGNOSIS — C18.9 COLON ADENOCARCINOMA (HCC): ICD-10-CM

## 2022-12-22 DIAGNOSIS — T45.1X5A CHEMOTHERAPY INDUCED NEUTROPENIA (HCC): Primary | ICD-10-CM

## 2022-12-22 PROCEDURE — 96372 THER/PROPH/DIAG INJ SC/IM: CPT

## 2022-12-22 PROCEDURE — 74011250636 HC RX REV CODE- 250/636: Performed by: NURSE PRACTITIONER

## 2022-12-22 PROCEDURE — 74011000250 HC RX REV CODE- 250: Performed by: INTERNAL MEDICINE

## 2022-12-22 PROCEDURE — 74011250636 HC RX REV CODE- 250/636: Performed by: INTERNAL MEDICINE

## 2022-12-22 RX ORDER — HEPARIN 100 UNIT/ML
300-500 SYRINGE INTRAVENOUS AS NEEDED
Status: DISCONTINUED | OUTPATIENT
Start: 2022-12-22 | End: 2022-12-23 | Stop reason: HOSPADM

## 2022-12-22 RX ORDER — SODIUM CHLORIDE 0.9 % (FLUSH) 0.9 %
10 SYRINGE (ML) INJECTION AS NEEDED
Status: DISCONTINUED | OUTPATIENT
Start: 2022-12-22 | End: 2022-12-23 | Stop reason: HOSPADM

## 2022-12-22 RX ORDER — SODIUM CHLORIDE 9 MG/ML
10 INJECTION INTRAMUSCULAR; INTRAVENOUS; SUBCUTANEOUS AS NEEDED
Status: DISCONTINUED | OUTPATIENT
Start: 2022-12-22 | End: 2022-12-23 | Stop reason: HOSPADM

## 2022-12-22 RX ADMIN — SODIUM CHLORIDE, PRESERVATIVE FREE 10 ML: 5 INJECTION INTRAVENOUS at 15:00

## 2022-12-22 RX ADMIN — PEGFILGRASTIM-BMEZ 6 MG: 6 INJECTION SUBCUTANEOUS at 15:12

## 2022-12-22 RX ADMIN — Medication 500 UNITS: at 15:00

## 2022-12-22 NOTE — PROGRESS NOTES
[facility] VISIT NOTE  Date: 2022    Name: Ward Mcmillan. MRN: [id number]         : 1975      Pt arrived at [facility] ambulatory and in no distress for pump disconnect.+ Ziextenzo injection. No new complaints voiced. Visit Vitals  /87   Pulse 94   Temp 97.7 °F (36.5 °C)   Resp 16       All chemo contents infused, pump completed at 1400. Medications Administered       heparin (porcine) pf 300-500 Units       Admin Date  2022 Action  Given Dose  500 Units Route  InterCATHeter Administered By  [place], [place]              pegfilgrastim-bmez [facility]) injection 6 mg       Admin Date  2022 Action  Given Dose  6 mg Route  SubCUTAneous Administered By  [place], [place]              sodium chloride (NS) flush 10 mL       Admin Date  2022 Action  Given Dose  10 mL Route  IntraVENous Administered By  [place], [place]                      Mr. Rae Nova tolerated treatment well and was discharged from [place] [address] in stable condition at 1515. Port de-accessed, flushed & heparinized per protocol. He is to return as scheduled for his next appointment.     [facility]  2022    Future Appointments:  Future Appointments   Date Time Provider Dahlia Epps   2022 11:30 AM Carito Culver Aas, MD [facility] BS AMB   2022  9:40 AM Madeline Ybarra MD [facility] BS AMB   1/3/2023  7:30 AM [facility]   1/3/2023  8:45 AM Albino Sage MD [facility] BS AMB   2023  3:00 [facility]   2023  3:00 PM Armando Amador MD [facility] BS AMB   2023  7:30 AM [facility]   2023  3:00 PM [facility]

## 2022-12-23 ENCOUNTER — TELEPHONE (OUTPATIENT)
Dept: ONCOLOGY | Age: 47
End: 2022-12-23

## 2022-12-23 ENCOUNTER — TELEPHONE (OUTPATIENT)
Dept: PALLATIVE CARE | Age: 47
End: 2022-12-23

## 2022-12-23 NOTE — PROGRESS NOTES
[id number] [facility] at [address] [place] [address]  [phone]    Hematology / Oncology Established Visit    Reason for Visit:   Brown Joe is a 41 y.o. male who in follow up of colon cancer. Hematology Oncology Treatment History:     Diagnosis #1: Follicular lymphoma  Stage: IV  Pathology:   11/13/18 right inguinal LN excision: Follicular lymphoma, high-grade (grade 3a of 3). Prior Treatment:   1. Obinutuzumab-CHOP. Obinutuzumab: 1000 mg weekly on days 1, 8, 15 for cycle 1, then 1000 mg on day 1 q21 days for cycles 2-6, then monotherapy 1000 mg every 21 days for cycle 7, 8 with Cyclophosphamide 750mg/m2, Doxorubicin 50mg/m2, Vincristine 1.4mg/m2 on day 1 and Prednisone 100mg on Days 1-5, every 21 days for a total of 2 cycles completed late 1/2019. Regimen discontinued due to STEMI. 2. Obinutuzumab + Bendamustine: 1000 mg Obinutuzumab on day 1 + Bendamustine 90mg/m2 on days 1-2 on a 28-day cycle x 4 cycles, completed 5/2019  Current Treatment: Surveillance      Diagnosis #2: Colon cancer:  Stage: IV  Pathology:    2/19/22 Ascending colon; biopsy: poorly differentiated carcinoma  Comment: No dysplasia is identified. Immunohistochemical stains performed on block 1A, show the tumor positive for Cam5.2 and shows patchy positivity for CDX2 with a Ki-67 index of -90%; the tumor is focally positive for CK5/6 and GATA3; negative for p63, Pax8, CK7, CK20, panmelanoma, synaptophysin and chromogranin. The immunophenotypic features are nonspecific but argues somewhat against the following carcinoma: urothelial, neuroendocrine and breast. The immunophenotypic features also argues against melanoma and somewhat against classic colorectal carcinoma. Additional immunohistochemical stains to exclude the possibility of prostate and hepatocellular carcinoma have been   ordered; the results will be reported as an addendum.   -MLH1/MSH2/MSH6/PMS2 all intact with no loss of nuclear expression of MMR proteins - no MSI   Addendum: The addendum is issued to report the results of additional immunohistochemical stains in an attempt to ascertain the primary site of the carcinoma. The diagnosis is unchanged. Hepar and glypican 3 immunohistochemical stains are neg, arguing against hepatocellular carcinoma. PSA stain is negative, arguing against prostatic primary. Imaging studies to eval for poss primary sites maybe useful. In the absence of carcinoma/tumor at any other sites, this may represent an undifferentiated carcinoma of the colon.  -Oncogenomics (molecular) studies: POLE< ARID1A, YVONNE, ATR, BRCA2, CHECK1, FANCI, NF1, PIK3CA, PTCH1, PTEN, RAD50, RAD51, RB1, SMARCA4, STK11    Prior Treatment:   1. Loop ileostomy 2/19/22  2. Diagnostic laparoscopy with peritoneal biopsy, 4/27/22    Current Treatment: FOLFOXIRI every 2 weeks until disease progression or toxicity, 5/16/22 - current      Oncologic History:  Was in his usual state of health in early November 2018 when he developed low back pain and presented to the ER. Work-up at outside hospital revealed a retroperitoneal mass seen on CT imaging, and he was transferred to [facility] for further work-up. CT there showed a large retroperitoneal mass encircling the aorta with invasion of the left renal hilum and left adrenal gland. There were bilateral inguinal lymph nodes and moderate left hydronephrosis. He was evaluated while at [facility] and was noted to have palpable nodes in his groin for approximately the past 1 month. He underwent excisional LN biopsy of right inguinal LN, which revealed follicular lymphoma. At time of diagnosis, he had no cardiac disease aside from HTN, hyperlipidemia. However, he did have an NSTEMI after cycle 2 of O-CHOP. Was likely unrelated to chemotherapy, but opted to switch treatment to Obinutuzumab-Bendamustine. He completed treatment in 5/2019 and had a CR based on PET.     History of Present Illness:   Mr. Maris Haney is a 41 y.o. male with is seen for follow up of colon cancer retry of C14. Completed CT imaging and signatera resulted. Reports ongoing abdominal pain not managed with oxycodone. Reports he had one episode of vomiting on Sunday. Appetite slightly decreased. Soft stool output in bag. PMHx: Lymphoma in 2018, CAD, HTN, Hyperlipidemia, Anxiety, chronic low back pain  PSurgHx: None aside from cardiac stent placement and port placement  SHx: Smokes 1/2ppd x past 20 yrs. Works part time as a cook. Has 2 children. FHx: Father had leukemia, passed away from pancreatic cancer. Review of Systems: A complete review of systems was obtained, negative except as described above. Physical Exam:     Visit Vitals  /78   Pulse 93   Temp 98.8 °F (37.1 °C)   Resp 16   Ht 5' 7\" (1.702 m)   Wt 137 lb 12.8 oz (62.5 kg)   SpO2 98%   BMI 21.58 kg/m²       ECOG PS: 0  General: Well developed, no acute distress  Eyes: anicteric sclerae  HENT: Atraumatic  Neck: Supple  Lymphatic: deferred today   Respiratory: CTAB, normal respiratory effort  CV: Normal rate, regular rhythm, no murmurs, no peripheral edema  GI: Tenderness to palpation of umbilicus and RUQ. Hard mass palpated RUQ and above the umbilicus. Colostomy in right lower quadrant with distention due to parastomal hernia  MS: Normal gait and station. Digits without clubbing or cyanosis. Skin: No rashes, ecchymoses, or petechiae. Normal temperature, turgor, and texture. Neuro/Psych: Alert, oriented. Appropriate affect, normal judgment/insight. Results:     Lab Results   Component Value Date/Time    WBC 6.7 01/03/2023 07:57 AM    HGB 9.9 (L) 01/03/2023 07:57 AM    HCT 30.6 (L) 01/03/2023 07:57 AM    PLATELET 86 (L) 75/49/4340 07:57 AM    MCV 99.0 01/03/2023 07:57 AM    ABS.  NEUTROPHILS 4.7 01/03/2023 07:57 AM    Hemoglobin (POC) 15.0 06/05/2009 02:13 PM    Hematocrit (POC) 39 02/14/2019 01:24 PM     Lab Results   Component Value Date/Time    Sodium 141 01/03/2023 07:57 AM    Potassium 3.3 (L) 2023 07:57 AM    Chloride 105 2023 07:57 AM    CO2 28 2023 07:57 AM    Glucose 135 (H) 2023 07:57 AM    BUN 5 (L) 2023 07:57 AM    Creatinine 0.92 2023 07:57 AM    GFR est AA >60 10/03/2022 07:50 AM    GFR est non-AA >60 10/03/2022 07:50 AM    Calcium 9.1 2023 07:57 AM    Sodium (POC) 136 2019 01:24 PM    Potassium (POC) 3.9 2019 01:24 PM    Chloride (POC) 102 2019 01:24 PM    Glucose (POC) 249 (H) 02/15/2019 10:21 PM    BUN (POC) 14 2019 01:24 PM    Creatinine (POC) 0.9 2019 01:24 PM    Calcium, ionized (POC) 1.24 2019 01:24 PM     Lab Results   Component Value Date/Time    Bilirubin, total 0.4 2023 07:57 AM    ALT (SGPT) 25 2023 07:57 AM    Alk. phosphatase 387 (H) 2023 07:57 AM    Protein, total 6.7 2023 07:57 AM    Albumin 3.0 (L) 2023 07:57 AM    Globulin 3.7 2023 07:57 AM     Lab Results   Component Value Date/Time    Iron 18 (L) 2022 11:13 AM    TIBC 173 (L) 2022 11:13 AM    Iron % saturation 10 (L) 2022 11:13 AM    Ferritin 426 (H) 2022 11:13 AM       No results found for: B12LT, FOL, RBCF  Lab Results   Component Value Date/Time    TSH 1.53 2016 04:40 AM     Lab Results   Component Value Date/Time    CEA 31.0 2022 08:20 AM         18:       Labs at [facility]:  22: CA 19-9 = 390  22: CEA = 12.3  22: CEA = 19.2  22: CEA = 17.9   22: CEA = 6  22: CEA = 6.8  22: CEA = 10.8  10/3/22: CEA 10.1  22: CEA = 21  22: CEA = 31    Signatera MRD:  22: 295.97 MTM/mL  22: 2.98  22: 0.88   10/24/22: 37.87  2022: 159    Imagin/9/18 Abd/pelvis CT: IMPRESSION:  1. Interval development of a large retroperitoneal mass encircling the aorta with invasion of the left renal hilum and left adrenal gland.  Several adjacent lymph nodes are seen extending into the peritoneum and underneath the  diaphragmatic natalie. This most likely represents lymphoma. 2. Several new bilateral enlarged inguinal lymph nodes also likely representing lymphoma. 3. Moderate left hydronephrosis with a delayed renal nephrogram related to decreased renal function. This is related to the invasion of the renal hilum. 11/11/18 Chest CT: IMPRESSION:  Trace left pleural effusion. Bilateral lower lobe atelectasis. Large  retroperitoneal mass lesion again demonstrated. PET 12/18/18:  FINDINGS:  HEAD/NECK: Right palatine tonsil intense hypermetabolism, max SUV 18. Multilevel  bilateral cervical adenopathy, with max SUV 12 in a left supraclavicular node. Cerebral evaluation is limited by normal intense activity. CHEST: Solitary hypermetabolic left axillary node, max SUV 11. ABDOMEN/PELVIS: Bulky retroperitoneal mass max SUV 27, with several additional  small active abdomino-pelvic nodes. Bilateral inguinal nodes with max SUV 12 on  the left. SKELETON: No foci of abnormal hypermetabolism in the axial and visualized  appendicular skeleton. IMPRESSION:   1. Right palatine tonsil tumor involvement (Deauville 5). 2. Bilateral cervical delaney involvement (Deauville 5). 3. Left axillary node involvement (Deauville 5). 4. Bulky retroperitoneal lymphoma mass and additional smaller hypermetabolic  abdomino-pelvic nodes (Deauville 5). 5. Bilateral inguinal delaney involvement (Deauville 5). Deauville Five Point Scale  1. No uptake or no residual uptake (when used interim)  2. Slight uptake, but below blood pool (mediastinum)  3. Uptake above mediastinal, but below/equal to uptake in the liver  4. Uptake slightly to moderately higher than liver  5. Markedly increased uptake or any new lesion (on response evaluation)  Each FDG-avid (or previously FDG avid) lesion is rated independently. Reference values:  Mediastinal blood pool: 2.1 SUV  Liver (background): 2.2 SUV    PET/CT 2/05/19:   IMPRESSION:  1.  No Foci of Abnormal Hypermetabolism (Deauville 1). 2. Resolved activity in the right palatine tonsil, bilateral cervical nodes,left axillary node, retroperitoneal/abdominal pelvic adenopathy, bilateral inguinal nodes. Echo 2/14/19:  Normal cavity size, wall thickness and systolic function (ejection fraction normal). The muscle mass is normal. The cavity shape is normal. The estimated ejection fraction is 41 - 45%. Abnormal wall motion as described on the wall scoring diagram below. End-systolic volume is normal. Normal left ventricular strain. There is mild (grade 1) left ventricular diastolic dysfunction. Normal left ventricular diastolic pressure. End-diastolic volume is normal.    LE arterial duplex 2/22/19:  There is evidence of left groin pseudoaneurysm noted arising from distal common femoral artery, pseudo lobe measures 2.32cm x 2.58cm and pseudo neck length measuring 0.63cm. There is no evidence of hemodynamically significant left lower extremity arterial obstruction. JACLYN is 1.03 on the right and 1.02 on the left. LE arterial duplex s/p Thrombin Injection to Pseudoaneurysm 2/26/19:  Successful thrombin injection procedure of the left groin with no further flow seen. No evidence of hemodnyamically significant obstruction in the left lower extremity. Left lower extremity arterial duplex performed. Confirmed pseudoaneurysm in left groin with small neck. Following thrombin injection, no further flow seen in the pseudoaneurysm. The left common femoral, profunda femoral, femoral, popliteal, posterior tibial and anterior arteries were imaged. Mainly triphasic flow was seen with no evidence of significantly elevated velocities. Repeat LE arterial duplex 2/27/19:  Continued thrombosed left groin pseudoaneurysm following thrombin injection on 02/26/2019. No flow or color fill is identified. The hematoma measures approximately 2.1 x 2.9 cm in diameter.  The common femoral, deep femoral, femoral, and popliteal arteries are patent with mainly tri-phasic flow and no significant hemodynamically obstruction is noted. Stress 5/31/19:  Normal stress myocardial perfusion without ischemia or infact at 84% MPHR. Normal LV function. LVEF 60%. No EKG changes of ischemia at peak exercise. Normal functional capacity. PET 6/03/19: IMPRESSION: No Foci of Abnormal Hypermetabolism (Deauville 1). -MRI lumbar spine 12/18/19:  Mild disc degenerative change at L3-4 and L4-5. Mild canal stenosis at L3-4 and mild left foraminal stenosis at L4-5. Other less severe degenerative findings are as described above. Continued diminished size of retroperitoneal mass-adenopathy,  with diminished soft tissue density at the left renal hilum. -CT chest/abdomen 12/16/19:  Findings are consistent with interval response to therapy    CT c/a/p 5/28/20: IMPRESSION:  Further contraction of the retroperitoneal mantle and chris mesentery,  compatible with treatment response  Stable left hilar soft tissue mass  Slight increased splaying of the duodenum and proximal jejunum is the result, without obstruction  No evidence for recurrence of lymphoma in the chest, abdomen, or pelvis    CT c/a/p 2/13/22:  FINDINGS:   LOWER THORAX: Dependent atelectasis with otherwise clear lungs. The visualized  heart is normal in size without pericardial effusion. LIVER: No mass. BILIARY TREE: Gallbladder is unremarkable. CBD is not dilated. SPLEEN: Unremarkable. PANCREAS: No mass or ductal dilatation. ADRENALS: Unremarkable. KIDNEYS: Atrophic left kidney with mild left hydronephrosis. Right kidney is  unremarkable with no stone, enhancing mass, or other renal abnormality. STOMACH: Unremarkable. SMALL BOWEL: No dilatation or wall thickening. COLON: Circumferential wall thickening at the hepatic flexure with luminal  narrowing, adjacent mesenteric stranding, and fluid with upstream retention in  the cecum and fecalization of distal ileal contents.  No dilation or other wall  thickening. APPENDIX: Unremarkable. PERITONEUM: Moderate free fluid with no pneumoperitoneum. RETROPERITONEUM: Soft tissue stranding around the celiac and SMA and associated aorta with no discrete mass or adenopathy. Aorta is normal in size without aneurysm or dissection. REPRODUCTIVE ORGANS: Prostate and seminal vesicles appear unremarkable. URINARY BLADDER: No mass or calculus. BONES: No destructive bone lesion. ABDOMINAL WALL: No mass or hernia. ADDITIONAL COMMENTS: N/A  IMPRESSION  1. Annular mass lesion of the right colon with upstream fecal retention  concerning for primary colon neoplasm versus less likely lymphoma. 2. Soft tissue stranding around celiac axis, SMA, and abdominal aorta may  reflect infiltrative lymphoma. 3. Left renal atrophy with left hydronephrosis likely reflecting chronic  proximal ureteral obstruction. 4. Incidentals as above. 2/24/22 CT abd/pelvis at [facility]:  FINDINGS: An enteric tube with sidehole beyond the level of the lower esophageal sphincter is seen looping on itself in the proximal stomach before terminating in the mid stomach. Status post right lower quadrant diverting ileostomy for an obstructing colonic mass. Multiple loops of gas dilated small bowel remain, with a maximal diameter of 5.4 cm whereas previously measured 3.6 cm. Dilute contrast is seen within the lateral left abdominal dilated small bowel. Moderate stool burden and bowel gas opacifies the cecum, measuring 7.3 cm in diameter. No definite supine evidence of pneumoperitoneum. Lung bases: Limited evaluation of the lung bases demonstrates a cardiac silhouette within normal limits for size. A small bore central venous catheter is seen terminating in the right atrium. No focal consolidation or pleural effusion. 2/24/22 CT abd/pelvis [facility]:  IMPRESSION:  1. Status post right lower quadrant loop ileostomy.  Mild diffuse dilation of small bowel proximal to the ostomy in the lower abdomen and upper pelvis concerning for at least mild to moderate partial bowel obstruction. Relatively decompressed loop ileostomy. 2. Mildly complex pelvic fluid collection in the rectovesical space, decreased in size from prior. 3. Redemonstrated ascending colon mass and findings suspicious for regional metastatic disease. 4. Redemonstrated soft tissue in the retroperitoneum with prominent lymph nodes, similar to prior examination. Differential would include metastasis, retroperitoneal fibrosis. 5. Mild atherosclerosis. 6. Subcentimeter right renal hypodensity, too small to characterize. 7. Severe compression of the left renal vein, similar to prior examination. 8. Additional findings as described above. Bones: No acute osseous abnormality. 2/24/22 CT chest [facility]:  IMPRESSION:  FINAL report. 1. No evidence of metastatic disease to the chest.  2. No enlarged lymph nodes. 3. Increasing volume loss in the lung bases which may be due to atelectasis. 4. CT abdomen and pelvis reported separately. 2/25/22 Colonoscopy at [facility]:  Impression:            - Preparation of the colon was inadequate. - Stool in the entire examined colon. - Likely malignant completely obstructing tumor at the                         hepatic flexure. Biopsied.                        - Malignant-appearing tumor in the colon. Complications:         No immediate complications. 7/19/22 CT ch/abd/pelv:  IMPRESSION  Response to treatment characterized by decrease in size of the apical core  lesion in the proximal transverse colon and decreased mucosal colic  lymphadenopathy. Persistent mesenteric lymphadenopathy and retroperitoneal/mesenteric soft tissue  which may relate to the patient's history of lymphoma. Chronic left renal atrophy with chronic left hydronephrosis. RECIST:  Target lesions:  Transverse colon mass, series 2, image 75, 27 x 26 mm, previously 49 x 45 mm.   Nontarget lesions:  Transverse mesocolic lymph nodes, decreased. 10/10/22 CT ch/abd/pelv:  IMPRESSION  Increase in peritoneal disease compared to the prior examination. Stable  mesenteric infiltrate. Improvement in the mass lesion involving the transverse  colon. RECIST:  Target lesion:  Transverse colon mass, series 2, image 76, 1.7 x 1.3 cm, previously 2.7 x 2.6cm. Nontarget lesions:   Transverse mesial colic lymph nodes unchanged. 12/30/22 CT ch/abd/pelvis:  IMPRESSION  Progression of disease with increase in size of perihepatic  infiltrative disease, increased in size and number of abdominal metastases, and  new soft tissue metastases in the anterior abdominal wall. Soft tissue  infiltrating around the celiac axis, SMA, and renal arteries and veins is not  significant changed. Incidentals as above with no CT evidence of metastatic  disease to the thorax. Assessment & Plan:   Fredie Hatchet. is a 41 y.o. male comes in for evaluation and management of lymphoma. 1. Undifferentiated carcinoma of transverse colon, metastatic, KRAS/NRAS status unclear:  SUZANNE  S/p diverting ileostomy due to large bowel obstruction. Colonoscopy with biopsy on 2/25/22. No obvious metastatic disease on CT imaging, but did have obvious metastatic disease to peritoneum during laparoscopy with Dr. Jose Gabriel. I recommended FOLFOXIRI every 2 weeks. Will not give Bevacizumab given h/o MI and tobacco use. [employer] suggests: BRCA2 and YVONNE mutations support use of Olaparib or similar PARP inhibitor in future. They also suggest testing for TMB, PDL1 to determine utility of checkpoint inhibitors. CT after C9 showed good response with decrease in size of colon mass, but increased peritoneal implants on 10/10/22.   Future treatment options include Keytruda/Nivolumab, Olaparib or possibly Panitumumab (KRAS wild type, TMB high suggested on [facility] pathology specimen)  Supportive medications: zofran, compazine, dexamethasone, EMLA topical  -- Refer to [facility] for consideration of short course palliative RT to painful abdominal metastasis. -- Discontinue FOLFOXIRI and switch to FOLFIRI + Panitumumab which he will start next week  -- Follow up PDL1 result from [facility]  -- Follow up PDL1, KRAS/NRAS/BRAF from peritoneal biopsy specimen 4/2022.   -- Check CEA every 8 weeks  -- Return in 1 week    2. H/o Follicular lymphoma:   Grade 3a with bulky disease encircling the aorta, causing pain. Bone marrow negative. BR better than RCHOP, but based on GALLIUM study, Obinutuzumab-based induction and maintenance prolongs PFS over that seen with rituximab-based therapy. Therefore, pt started on O-CHOP regimen. Pt achieved CR after C2, but was switched to BR x 4 cycles given STEMI. Completed treatment 5/2019. End of treatment PET 6/3/19 showed CR. No extra surveillance needed at this time given metastatic colon cancer with frequent imaging. Current imaging shows slight increase in soft tissue density in deep pelvis on 10/2022. Will continue to monitor for colon cancer follow up imaging. 3. Chronic lumbar back pain / anxiety / RLQ / acute right upper quadrant and umbilical pain:   Left lower back pain is chronic/stable. RLQ is likely related to neoplasm/colostomy/parastomal hernia. Evaluated by Dr. Ann Marie Reece who believes umbilical burning pain is due to tumor implants. Pain moderately managed on Oxycontin 20mg q12h, oxycodone 10mg q4h prn. Following with Dr. Sharyn Sanchez. -- Advised he discuss increasing pain medication with Dr. Sharyn Sanchez. -- Referral to [facility] to discuss palliative radiation to new tumor implants. 4. CAD / HTN:   h/o STEMI 2/14/19 with TOM placed to proximal LAD. Following with Dr. Jaya Rodríguez and remains on dual antiplatelet therapy, high dose Lipitor, Metoprolol, ACEI. Received only 2 doses of cardiotoxic chemo, Doxorubicin in early 1/2019. Is overdue for follow up with Dr. Jaya Rodríguez.   -- Not currently taking Metoprolol or ACEI.  Referred back to Dr. Jaya Rodríguez since he is overdue for follow up. Scheduled 1/11/23.     5. Tobacco abuse:   Previously discussed smoking cessation strategies and benefits. Pt has tried quitting in the past and is not interested. 6. BRCA2 mutation:  Pt would benefit from genetic counseling for himself and his family. 7. Chemotherapy induced neutropenia:  Intermittent. Neulasta on-body added with C3-4, but discontinued due to insurance denial. Nilda Bloodgood added with pump removal starting with C13. Given patient is unable to return the next day for Nilda Bloodgood, will proceed with giving it on the day of pump removal.     8. Chemotherapy induced thrombocytopenia:  Mild. Monitor for bleeding. Goals of care: Disease is not curable, but is treatable to improve quality and duration of life. I personally saw and evaluated the patient and performed the key components of medical decision making. The history, physical exam, and documentation were performed by Nuvia Chung NP. I reviewed and verified the above documentation and modified it as needed. Hold treatment today given disease progression seen on imaging. Discussed treatment options with patient, and will switch to FOLFIRI + Panitumumab starting next week.      Signed By: Lauren Robledo MD

## 2022-12-23 NOTE — TELEPHONE ENCOUNTER
Called patient to advise/confirm upcoming appt with Dr. Xiomara Hicks on 12/27/22 at 11:30 at [facility]. Got voicemail. Left message   Also advised to please bring in your 's License and Insurance Card and any pain medications in the original container with you to appointment.

## 2022-12-23 NOTE — TELEPHONE ENCOUNTER
[address] at [facility]  [phone]        12/23/22 12:21 PM Called patient to remind him to have his CT chest/abd/pelv completed. Patient stated he had scheduled this for 12/29 at 12 PM. No record of this per encounters. Call placed to scheduling department. Spoke with Mendoza who confirmed no imaging appointment scheduled. Proceeded with scheduling CT for 12/29 at 1:30 PM. Patient to arrive at [facility] outpatient registration at 12 PM. He should be NPO 4 hours prior, but clear liquids are okay. Called patient and updated him of above. Advised, per Tyler Granados, that if burning sensation in abdomen is not worse, then okay to hold off on completing KUB and can just undergo CT. Explained that KUB is completed on a walk-in basis, no appointment needed. Patient voiced understanding. No further questions or concerns at this time.

## 2022-12-27 ENCOUNTER — APPOINTMENT (OUTPATIENT)
Dept: INFUSION THERAPY | Age: 47
End: 2022-12-27
Payer: MEDICAID

## 2022-12-28 ENCOUNTER — TELEPHONE (OUTPATIENT)
Dept: PALLATIVE CARE | Age: 47
End: 2022-12-28

## 2022-12-28 DIAGNOSIS — R10.84 ABDOMINAL PAIN, GENERALIZED: ICD-10-CM

## 2022-12-28 DIAGNOSIS — G89.29 CHRONIC LEFT-SIDED LOW BACK PAIN WITHOUT SCIATICA: ICD-10-CM

## 2022-12-28 DIAGNOSIS — C78.6 PERITONEAL CARCINOMATOSIS (HCC): ICD-10-CM

## 2022-12-28 DIAGNOSIS — M54.50 CHRONIC LEFT-SIDED LOW BACK PAIN WITHOUT SCIATICA: ICD-10-CM

## 2022-12-28 DIAGNOSIS — C18.9 COLON ADENOCARCINOMA (HCC): ICD-10-CM

## 2022-12-28 RX ORDER — OXYCODONE HYDROCHLORIDE 10 MG/1
10 TABLET ORAL
Qty: 90 TABLET | Refills: 0 | Status: SHIPPED | OUTPATIENT
Start: 2023-01-03 | End: 2023-01-18

## 2022-12-28 RX ORDER — OXYCODONE HCL 20 MG/1
20 TABLET, FILM COATED, EXTENDED RELEASE ORAL EVERY 12 HOURS
Qty: 60 TABLET | Refills: 0 | Status: SHIPPED | OUTPATIENT
Start: 2022-12-28 | End: 2023-01-27

## 2022-12-29 ENCOUNTER — OFFICE VISIT (OUTPATIENT)
Dept: SURGERY | Age: 47
End: 2022-12-29
Payer: MEDICAID

## 2022-12-29 ENCOUNTER — APPOINTMENT (OUTPATIENT)
Dept: INFUSION THERAPY | Age: 47
End: 2022-12-29
Payer: MEDICAID

## 2022-12-29 ENCOUNTER — HOSPITAL ENCOUNTER (OUTPATIENT)
Dept: CT IMAGING | Age: 47
Discharge: HOME | End: 2022-12-29
Attending: NURSE PRACTITIONER
Payer: MEDICAID

## 2022-12-29 VITALS
RESPIRATION RATE: 19 BRPM | HEART RATE: 96 BPM | HEIGHT: 67 IN | WEIGHT: 134.8 LBS | SYSTOLIC BLOOD PRESSURE: 128 MMHG | TEMPERATURE: 97.6 F | DIASTOLIC BLOOD PRESSURE: 88 MMHG | OXYGEN SATURATION: 98 % | BODY MASS INDEX: 21.16 KG/M2

## 2022-12-29 DIAGNOSIS — C18.9 COLON ADENOCARCINOMA (HCC): Primary | ICD-10-CM

## 2022-12-29 DIAGNOSIS — C18.4 CARCINOMA OF TRANSVERSE COLON (HCC): ICD-10-CM

## 2022-12-29 DIAGNOSIS — K43.5 PARASTOMAL HERNIA WITHOUT OBSTRUCTION OR GANGRENE: ICD-10-CM

## 2022-12-29 PROCEDURE — 99213 OFFICE O/P EST LOW 20 MIN: CPT | Performed by: SURGERY

## 2022-12-29 PROCEDURE — 74177 CT ABD & PELVIS W/CONTRAST: CPT

## 2022-12-29 PROCEDURE — 3074F SYST BP LT 130 MM HG: CPT | Performed by: SURGERY

## 2022-12-29 PROCEDURE — 74011250636 HC RX REV CODE- 250/636: Performed by: NURSE PRACTITIONER

## 2022-12-29 PROCEDURE — 3078F DIAST BP <80 MM HG: CPT | Performed by: SURGERY

## 2022-12-29 PROCEDURE — 74011000636 HC RX REV CODE- 636: Performed by: NURSE PRACTITIONER

## 2022-12-29 RX ORDER — HEPARIN 100 UNIT/ML
500 SYRINGE INTRAVENOUS
Status: COMPLETED | OUTPATIENT
Start: 2022-12-29 | End: 2022-12-29

## 2022-12-29 RX ADMIN — HEPARIN 500 UNITS: 100 SYRINGE at 13:48

## 2022-12-29 RX ADMIN — IOPAMIDOL 100 ML: 755 INJECTION, SOLUTION INTRAVENOUS at 13:38

## 2022-12-29 NOTE — LETTER
12/29/2022    Patient: Andres Conley. YOB: 1975   Date of Visit: 12/29/2022     Bessie Bailey MD  [address] [address] [place] [address]  Via Fax: [phone]    Dear Bessie Bailey MD,      Thank you for referring Mr. Kenna Dance to [facility] [address] for evaluation. My notes for this consultation are attached. If you have questions, please do not hesitate to call me. I look forward to following your patient along with you.       Sincerely,    Curtis Smith MD

## 2022-12-29 NOTE — PROGRESS NOTES
[facility]      Clinic Note - Follow up    Maddie3 ANGELIC Esquivel returns for follow up today. He is known to me for history of a laparoscopic diverting loop ileostomy at [address] [place] [address] and subsequent diagnostic laparoscopy by me on 4/27/22 that showed evidence of diffuse peritoneal carcinomatosis from a proximal transverse colon adenocarcinoma. He had a prior history of lymphoma. He is now on palliative chemotherapy under the care of Dr. Katelyn Calabrese. The patient presents with complaints of pain in the periumbilical area that has been worsening over the past month. This is constant and gradually worsening. He denies any obstructive symptoms. No nausea or vomiting. He is having good output from his ostomy. He is scheduled for a CT scan later today. He reports that he continues to smoke. He has not noted any significant increase in size in the known parastomal hernia. The patient denies any changes to the [facility], PSx, SHx or FHx since their last visit except as mentioned above. ROS is negative except as mentioned in the HPI. Objective     Visit Vitals  /88 (BP 1 Location: Left upper arm, BP Patient Position: Sitting, BP Cuff Size: Adult)   Pulse 96   Temp 97.6 °F (36.4 °C) (Oral)   Resp 19   Ht 5' 7\" (1.702 m)   Wt 134 lb 12.8 oz (61.1 kg)   SpO2 98%   BMI 21.11 kg/m²         PE  GEN - Awake, alert, communicating appropriately. NAD  Pulm - CTAB  CV - RRR  Abd - soft, NT, ND. There is a firm nodule on the superior/left side of the umbilicus near his previous laparoscopic incision site suspicious for a malignant implant. Parastomal hernia is soft and nontender. Ostomy is pink and productive of soft stool. Remaining laparoscopic incisions well-healed without nodularity or signs of hernia. Ext - warm, well perfused, no edema. All other systems negative unless indicated above.       Labs  Lab Results   Component Value Date/Time    WBC 4.9 12/20/2022 07:54 AM Hemoglobin (POC) 15.0 06/05/2009 02:13 PM    HGB 11.2 (L) 12/20/2022 07:54 AM    Hematocrit (POC) 39 02/14/2019 01:24 PM    HCT 33.7 (L) 12/20/2022 07:54 AM    PLATELET 411 96/78/5655 07:54 AM    .3 (H) 12/20/2022 07:54 AM     Lab Results   Component Value Date/Time    Sodium 140 12/20/2022 07:54 AM    Potassium 3.8 12/20/2022 07:54 AM    Chloride 106 12/20/2022 07:54 AM    CO2 26 12/20/2022 07:54 AM    Anion gap 8 12/20/2022 07:54 AM    Glucose 133 (H) 12/20/2022 07:54 AM    BUN 7 12/20/2022 07:54 AM    Creatinine 0.98 12/20/2022 07:54 AM    BUN/Creatinine ratio 7 (L) 12/20/2022 07:54 AM    GFR est AA >60 10/03/2022 07:50 AM    GFR est non-AA >60 10/03/2022 07:50 AM    Calcium 9.3 12/20/2022 07:54 AM    Bilirubin, total 0.4 12/20/2022 07:54 AM    Alk. phosphatase 396 (H) 12/20/2022 07:54 AM    Protein, total 7.0 12/20/2022 07:54 AM    Albumin 3.0 (L) 12/20/2022 07:54 AM    Globulin 4.0 12/20/2022 07:54 AM    A-G Ratio 0.8 (L) 12/20/2022 07:54 AM    ALT (SGPT) 24 12/20/2022 07:54 AM    AST (SGOT) 25 12/20/2022 07:54 AM         Imaging  None    Assessment     Riya Reed Sr. is a 41 y. o.yr old male with history of metastatic colon cancer s/p diverting loop ileostomy at [facility]. He has a nonobstructing parastomal hernia. Plan     I am concerned that this was the patient's pain may be a tumor implant in the abdominal wall at the site of his previous supraumbilical incision. This feels firm on exam and given his known history of peritoneal carcinomatosis, could be consistent with a implant causing pain. I would like to see the patient's CT later today, but if a lesion is identified that does not appear to be a hernia, this could potentially be biopsied. Depending on the size of the lesion, this may be amenable to local excision or palliative radiation. I will further discuss with his oncologist after the CT.   I doubt that his parastomal hernia is the cause of his discomfort and would leave this alone as it is nonobstructing and has been stable in size. 20 mins of time was spent with the patient of which > 50% of the time involved face-to-face counseling of the patient regarding the proposed treatment plan.       Berny Jarvis MD  12/29/2022    CC: MD Dr. Howard Ross

## 2022-12-29 NOTE — PROGRESS NOTES
Identified pt with two pt identifiers (name and ). Reviewed chart in preparation for visit and have obtained necessary documentation. Yonathan Gonzalez is a 41 y.o. male  Chief Complaint   Patient presents with    Follow-up     Eval hernia. Visit Vitals  /88 (BP 1 Location: Left upper arm, BP Patient Position: Sitting, BP Cuff Size: Adult)   Pulse 96   Temp 97.6 °F (36.4 °C) (Oral)   Resp 19   Ht 5' 7\" (1.702 m)   Wt 134 lb 12.8 oz (61.1 kg)   SpO2 98%   BMI 21.11 kg/m²       1. Have you been to the ER, urgent care clinic since your last visit? Hospitalized since your last visit? No    2. Have you seen or consulted any other health care providers outside of the [address] [place] [address] since your last visit? Include any pap smears or colon screening.  No

## 2022-12-30 ENCOUNTER — TELEPHONE (OUTPATIENT)
Dept: ONCOLOGY | Age: 47
End: 2022-12-30

## 2022-12-30 NOTE — TELEPHONE ENCOUNTER
[facility]  [phone]        12/30/22 9:39 AM Received Genejassia Bropk results. Report already scanned to patient's chart. Will notify Gt Luna NP. No further questions or concerns at this time.

## 2023-01-01 ENCOUNTER — HOME CARE VISIT (OUTPATIENT)
Dept: SCHEDULING | Facility: HOME HEALTH | Age: 48
End: 2023-01-01
Payer: MEDICAID

## 2023-01-01 ENCOUNTER — HOSPICE ADMISSION (OUTPATIENT)
Dept: HOSPICE | Facility: HOSPICE | Age: 48
End: 2023-01-01
Payer: MEDICAID

## 2023-01-01 ENCOUNTER — HOME CARE VISIT (OUTPATIENT)
Dept: HOSPICE | Facility: HOSPICE | Age: 48
End: 2023-01-01
Payer: MEDICAID

## 2023-01-01 ENCOUNTER — APPOINTMENT (OUTPATIENT)
Dept: INFUSION THERAPY | Age: 48
End: 2023-01-01

## 2023-01-01 ENCOUNTER — HOSPITAL ENCOUNTER (OUTPATIENT)
Dept: INFUSION THERAPY | Age: 48
End: 2023-01-01

## 2023-01-01 VITALS
HEART RATE: 122 BPM | OXYGEN SATURATION: 96 % | SYSTOLIC BLOOD PRESSURE: 126 MMHG | DIASTOLIC BLOOD PRESSURE: 90 MMHG | RESPIRATION RATE: 22 BRPM

## 2023-01-01 VITALS
RESPIRATION RATE: 20 BRPM | HEART RATE: 112 BPM | OXYGEN SATURATION: 98 % | SYSTOLIC BLOOD PRESSURE: 127 MMHG | DIASTOLIC BLOOD PRESSURE: 88 MMHG

## 2023-01-01 PROCEDURE — G0299 HHS/HOSPICE OF RN EA 15 MIN: HCPCS

## 2023-01-01 PROCEDURE — 0651 HSPC ROUTINE HOME CARE

## 2023-01-01 PROCEDURE — HOSPICE MEDICATION HC HH HOSPICE MEDICATION

## 2023-01-03 ENCOUNTER — HOSPITAL ENCOUNTER (OUTPATIENT)
Dept: INFUSION THERAPY | Age: 48
Discharge: HOME | End: 2023-01-03
Payer: MEDICAID

## 2023-01-03 ENCOUNTER — OFFICE VISIT (OUTPATIENT)
Dept: PALLATIVE CARE | Age: 48
End: 2023-01-03
Payer: MEDICAID

## 2023-01-03 ENCOUNTER — APPOINTMENT (OUTPATIENT)
Dept: INFUSION THERAPY | Age: 48
End: 2023-01-03
Payer: MEDICAID

## 2023-01-03 ENCOUNTER — OFFICE VISIT (OUTPATIENT)
Dept: ONCOLOGY | Age: 48
End: 2023-01-03

## 2023-01-03 ENCOUNTER — TELEPHONE (OUTPATIENT)
Dept: PALLATIVE CARE | Age: 48
End: 2023-01-03

## 2023-01-03 VITALS
TEMPERATURE: 97.2 F | SYSTOLIC BLOOD PRESSURE: 129 MMHG | HEIGHT: 67 IN | WEIGHT: 137 LBS | BODY MASS INDEX: 21.5 KG/M2 | RESPIRATION RATE: 20 BRPM | DIASTOLIC BLOOD PRESSURE: 82 MMHG | HEART RATE: 83 BPM | OXYGEN SATURATION: 98 %

## 2023-01-03 VITALS
DIASTOLIC BLOOD PRESSURE: 78 MMHG | HEIGHT: 67 IN | WEIGHT: 137.8 LBS | SYSTOLIC BLOOD PRESSURE: 122 MMHG | BODY MASS INDEX: 21.63 KG/M2 | RESPIRATION RATE: 16 BRPM | TEMPERATURE: 98.8 F | OXYGEN SATURATION: 98 % | HEART RATE: 93 BPM

## 2023-01-03 VITALS
OXYGEN SATURATION: 98 % | RESPIRATION RATE: 16 BRPM | WEIGHT: 137.8 LBS | HEIGHT: 67 IN | SYSTOLIC BLOOD PRESSURE: 122 MMHG | DIASTOLIC BLOOD PRESSURE: 78 MMHG | HEART RATE: 93 BPM | TEMPERATURE: 98.8 F | BODY MASS INDEX: 21.63 KG/M2

## 2023-01-03 DIAGNOSIS — R11.0 NAUSEA WITHOUT VOMITING: ICD-10-CM

## 2023-01-03 DIAGNOSIS — R10.84 ABDOMINAL PAIN, GENERALIZED: Primary | ICD-10-CM

## 2023-01-03 DIAGNOSIS — Z76.89 PREVENTION OF CHEMOTHERAPY-INDUCED NEUTROPENIA: ICD-10-CM

## 2023-01-03 DIAGNOSIS — C18.9 COLON ADENOCARCINOMA (HCC): ICD-10-CM

## 2023-01-03 DIAGNOSIS — R10.33 UMBILICAL PAIN: ICD-10-CM

## 2023-01-03 DIAGNOSIS — T45.1X5A CHEMOTHERAPY INDUCED NEUTROPENIA (HCC): Primary | ICD-10-CM

## 2023-01-03 DIAGNOSIS — M79.2 NEUROPATHIC PAIN: ICD-10-CM

## 2023-01-03 DIAGNOSIS — M54.50 CHRONIC LEFT-SIDED LOW BACK PAIN WITHOUT SCIATICA: ICD-10-CM

## 2023-01-03 DIAGNOSIS — F41.9 ANXIETY: ICD-10-CM

## 2023-01-03 DIAGNOSIS — G89.29 CHRONIC LEFT-SIDED LOW BACK PAIN WITHOUT SCIATICA: ICD-10-CM

## 2023-01-03 DIAGNOSIS — C82.90 FOLLICULAR LYMPHOMA, UNSPECIFIED FOLLICULAR LYMPHOMA TYPE, UNSPECIFIED BODY REGION (HCC): ICD-10-CM

## 2023-01-03 DIAGNOSIS — R53.83 OTHER FATIGUE: ICD-10-CM

## 2023-01-03 DIAGNOSIS — D70.1 CHEMOTHERAPY INDUCED NEUTROPENIA (HCC): Primary | ICD-10-CM

## 2023-01-03 DIAGNOSIS — R63.0 POOR APPETITE: Primary | ICD-10-CM

## 2023-01-03 DIAGNOSIS — T45.1X5A CHEMOTHERAPY-INDUCED THROMBOCYTOPENIA: ICD-10-CM

## 2023-01-03 DIAGNOSIS — D69.59 CHEMOTHERAPY-INDUCED THROMBOCYTOPENIA: ICD-10-CM

## 2023-01-03 DIAGNOSIS — C18.4 CARCINOMA OF TRANSVERSE COLON (HCC): Primary | ICD-10-CM

## 2023-01-03 DIAGNOSIS — R63.0 POOR APPETITE: ICD-10-CM

## 2023-01-03 DIAGNOSIS — Z85.72 HISTORY OF FOLLICULAR LYMPHOMA: ICD-10-CM

## 2023-01-03 LAB
ALBUMIN SERPL-MCNC: 3 G/DL (ref 3.5–5)
ALBUMIN/GLOB SERPL: 0.8 (ref 1.1–2.2)
ALP SERPL-CCNC: 387 U/L (ref 45–117)
ALT SERPL-CCNC: 25 U/L (ref 12–78)
ANION GAP SERPL CALC-SCNC: 8 MMOL/L (ref 5–15)
AST SERPL-CCNC: 26 U/L (ref 15–37)
BASOPHILS # BLD: 0 K/UL (ref 0–0.1)
BASOPHILS NFR BLD: 0 % (ref 0–1)
BILIRUB SERPL-MCNC: 0.4 MG/DL (ref 0.2–1)
BUN SERPL-MCNC: 5 MG/DL (ref 6–20)
BUN/CREAT SERPL: 5 (ref 12–20)
CALCIUM SERPL-MCNC: 9.1 MG/DL (ref 8.5–10.1)
CHLORIDE SERPL-SCNC: 105 MMOL/L (ref 97–108)
CO2 SERPL-SCNC: 28 MMOL/L (ref 21–32)
CREAT SERPL-MCNC: 0.92 MG/DL (ref 0.7–1.3)
DIFFERENTIAL METHOD BLD: ABNORMAL
EOSINOPHIL # BLD: 0.2 K/UL (ref 0–0.4)
EOSINOPHIL NFR BLD: 3 % (ref 0–7)
ERYTHROCYTE [DISTWIDTH] IN BLOOD BY AUTOMATED COUNT: 15 % (ref 11.5–14.5)
GLOBULIN SER CALC-MCNC: 3.7 G/DL (ref 2–4)
GLUCOSE SERPL-MCNC: 135 MG/DL (ref 65–100)
HCT VFR BLD AUTO: 30.6 % (ref 36.6–50.3)
HGB BLD-MCNC: 9.9 G/DL (ref 12.1–17)
IMM GRANULOCYTES # BLD AUTO: 0.1 K/UL (ref 0–0.04)
IMM GRANULOCYTES NFR BLD AUTO: 1 % (ref 0–0.5)
LYMPHOCYTES # BLD: 1.1 K/UL (ref 0.8–3.5)
LYMPHOCYTES NFR BLD: 17 % (ref 12–49)
MCH RBC QN AUTO: 32 PG (ref 26–34)
MCHC RBC AUTO-ENTMCNC: 32.4 G/DL (ref 30–36.5)
MCV RBC AUTO: 99 FL (ref 80–99)
MONOCYTES # BLD: 0.6 K/UL (ref 0–1)
MONOCYTES NFR BLD: 9 % (ref 5–13)
NEUTS SEG # BLD: 4.7 K/UL (ref 1.8–8)
NEUTS SEG NFR BLD: 70 % (ref 32–75)
NRBC # BLD: 0 K/UL (ref 0–0.01)
NRBC BLD-RTO: 0 PER 100 WBC
PLATELET # BLD AUTO: 86 K/UL (ref 150–400)
PMV BLD AUTO: 11.9 FL (ref 8.9–12.9)
POTASSIUM SERPL-SCNC: 3.3 MMOL/L (ref 3.5–5.1)
PROT SERPL-MCNC: 6.7 G/DL (ref 6.4–8.2)
RBC # BLD AUTO: 3.09 M/UL (ref 4.1–5.7)
RBC MORPH BLD: ABNORMAL
SODIUM SERPL-SCNC: 141 MMOL/L (ref 136–145)
WBC # BLD AUTO: 6.7 K/UL (ref 4.1–11.1)

## 2023-01-03 PROCEDURE — 3078F DIAST BP <80 MM HG: CPT | Performed by: INTERNAL MEDICINE

## 2023-01-03 PROCEDURE — 85025 COMPLETE CBC W/AUTO DIFF WBC: CPT

## 2023-01-03 PROCEDURE — 99214 OFFICE O/P EST MOD 30 MIN: CPT | Performed by: INTERNAL MEDICINE

## 2023-01-03 PROCEDURE — 36591 DRAW BLOOD OFF VENOUS DEVICE: CPT

## 2023-01-03 PROCEDURE — 77030016057 HC NDL HUBR APOL -B

## 2023-01-03 PROCEDURE — 74011250637 HC RX REV CODE- 250/637: Performed by: NURSE PRACTITIONER

## 2023-01-03 PROCEDURE — 74011000250 HC RX REV CODE- 250: Performed by: NURSE PRACTITIONER

## 2023-01-03 PROCEDURE — 80053 COMPREHEN METABOLIC PANEL: CPT

## 2023-01-03 PROCEDURE — 74011250636 HC RX REV CODE- 250/636: Performed by: NURSE PRACTITIONER

## 2023-01-03 PROCEDURE — 3074F SYST BP LT 130 MM HG: CPT | Performed by: INTERNAL MEDICINE

## 2023-01-03 RX ORDER — HEPARIN 100 UNIT/ML
300-500 SYRINGE INTRAVENOUS AS NEEDED
Status: ACTIVE | OUTPATIENT
Start: 2023-01-03 | End: 2023-01-03

## 2023-01-03 RX ORDER — OXYCODONE HYDROCHLORIDE 10 MG/1
20 TABLET ORAL
Qty: 120 TABLET | Refills: 0 | Status: SHIPPED | OUTPATIENT
Start: 2023-01-10 | End: 2023-01-20

## 2023-01-03 RX ORDER — SODIUM CHLORIDE 9 MG/ML
10 INJECTION INTRAVENOUS AS NEEDED
Status: ACTIVE | OUTPATIENT
Start: 2023-01-03 | End: 2023-01-03

## 2023-01-03 RX ORDER — SODIUM CHLORIDE 0.9 % (FLUSH) 0.9 %
10 SYRINGE (ML) INJECTION AS NEEDED
Status: DISPENSED | OUTPATIENT
Start: 2023-01-03 | End: 2023-01-03

## 2023-01-03 RX ORDER — POTASSIUM CHLORIDE 750 MG/1
40 TABLET, FILM COATED, EXTENDED RELEASE ORAL
Status: COMPLETED | OUTPATIENT
Start: 2023-01-03 | End: 2023-01-03

## 2023-01-03 RX ORDER — GABAPENTIN 300 MG/1
300 CAPSULE ORAL
Qty: 30 CAPSULE | Refills: 0 | Status: SHIPPED | OUTPATIENT
Start: 2023-01-03

## 2023-01-03 RX ADMIN — HEPARIN 500 UNITS: 100 SYRINGE at 10:25

## 2023-01-03 RX ADMIN — POTASSIUM CHLORIDE 40 MEQ: 750 TABLET, FILM COATED, EXTENDED RELEASE ORAL at 10:22

## 2023-01-03 RX ADMIN — Medication 10 ML: at 10:25

## 2023-01-03 NOTE — TELEPHONE ENCOUNTER
Palliative Medicine  Nursing Note  [phone]  Fax [phone]      Telephone Call  Patient Name: Heidi Mcnair. YOB: 1975    1/3/2023        Primary Decision Maker: Billy Cabrera - Girlfriend - [phone]   Advance Care Planning 12/14/2022   Patient's Healthcare Decision Maker is: Legal Next of Kin   Confirm Advance Directive Yes, on file   Patient Would Like to Complete Advance Directive -   Does the patient have other document types -       Per Dr. Jude Jackson, order placed for referral to [facility] for dietary assistance regarding patient's weight loss and poor appetite related to colon adenocarcinoma.      Douglas Neri, RN  Palliative Medicine

## 2023-01-03 NOTE — PROGRESS NOTES
[facility] Progress Note    Date: January 3, 2023    Name: Yamil Bran. MRN: [id number]         : 1975    Mr. Cristiana Islas Arrived ambulatory and in no distress for C14D1 of Folfoxiri Regimen (HELD). Assessment was completed, Patient having 10/10 abdominal pain and nausea. Right chest wall port accessed without difficulty, labs drawn & sent for processing. Dorrie Olszewski, Pharmacist notified of held treatment today. Chemotherapy Flowsheet 1/3/2023   Cycle C14D1   Date 1/3/2023   Drug / Regimen Folfoxiri   Post Hydration -   Pre Meds -   Notes HELD       0845- Patient proceed to appointment with Dr. Kimberly Minor. Mr. Renato Arechiga vitals were reviewed. Visit Vitals  /78 (BP 1 Location: Left arm, BP Patient Position: Sitting)   Pulse 93   Temp 98.8 °F (37.1 °C)   Resp 16   Ht 5' 7\" (1.702 m)   Wt 62.5 kg (137 lb 12.8 oz)   SpO2 98%   BMI 21.58 kg/m²       Lab results were obtained and reviewed. Recent Results (from the past 12 hour(s))   CBC WITH AUTOMATED DIFF    Collection Time: 23  7:57 AM   Result Value Ref Range    WBC 6.7 4.1 - 11.1 K/uL    RBC 3.09 (L) 4.10 - 5.70 M/uL    HGB 9.9 (L) 12.1 - 17.0 g/dL    HCT 30.6 (L) 36.6 - 50.3 %    MCV 99.0 80.0 - 99.0 FL    MCH 32.0 26.0 - 34.0 PG    MCHC 32.4 30.0 - 36.5 g/dL    RDW 15.0 (H) 11.5 - 14.5 %    PLATELET 86 (L) 427 - 400 K/uL    MPV 11.9 8.9 - 12.9 FL    NRBC 0.0 0  WBC    ABSOLUTE NRBC 0.00 0.00 - 0.01 K/uL    NEUTROPHILS 70 32 - 75 %    LYMPHOCYTES 17 12 - 49 %    MONOCYTES 9 5 - 13 %    EOSINOPHILS 3 0 - 7 %    BASOPHILS 0 0 - 1 %    IMMATURE GRANULOCYTES 1 (H) 0.0 - 0.5 %    ABS. NEUTROPHILS 4.7 1.8 - 8.0 K/UL    ABS. LYMPHOCYTES 1.1 0.8 - 3.5 K/UL    ABS. MONOCYTES 0.6 0.0 - 1.0 K/UL    ABS. EOSINOPHILS 0.2 0.0 - 0.4 K/UL    ABS. BASOPHILS 0.0 0.0 - 0.1 K/UL    ABS. IMM.  GRANS. 0.1 (H) 0.00 - 0.04 K/UL    DF SMEAR SCANNED      RBC COMMENTS NORMOCYTIC, NORMOCHROMIC     METABOLIC PANEL, COMPREHENSIVE    Collection Time: 23  7:57 AM Result Value Ref Range    Sodium 141 136 - 145 mmol/L    Potassium 3.3 (L) 3.5 - 5.1 mmol/L    Chloride 105 97 - 108 mmol/L    CO2 28 21 - 32 mmol/L    Anion gap 8 5 - 15 mmol/L    Glucose 135 (H) 65 - 100 mg/dL    BUN 5 (L) 6 - 20 MG/DL    Creatinine 0.92 0.70 - 1.30 MG/DL    BUN/Creatinine ratio 5 (L) 12 - 20      eGFR >60 >60 ml/min/1.73m2    Calcium 9.1 8.5 - 10.1 MG/DL    Bilirubin, total 0.4 0.2 - 1.0 MG/DL    ALT (SGPT) 25 12 - 78 U/L    AST (SGOT) 26 15 - 37 U/L    Alk. phosphatase 387 (H) 45 - 117 U/L    Protein, total 6.7 6.4 - 8.2 g/dL    Albumin 3.0 (L) 3.5 - 5.0 g/dL    Globulin 3.7 2.0 - 4.0 g/dL    A-G Ratio 0.8 (L) 1.1 - 2.2         Medications:  Medications Administered       heparin (porcine) pf 300-500 Units       Admin Date  01/03/2023 Action  Given Dose  500 Units Route  InterCATHeter Administered By  Chacha Ovalles RN              potassium chloride SR (KLOR-CON 10) tablet 40 mEq       Admin Date  01/03/2023 Action  Given Dose  40 mEq Route  Oral Administered By  Chacha Ovalles RN              sodium chloride (NS) flush 10 mL       Admin Date  01/03/2023 Action  Given Dose  10 mL Route  IntraVENous Administered By  Chacha Ovalles RN                    Mr. Matthew Condon was discharged from [address] [address] in stable condition at 1030am.   Port de-accessed, flushed & heparinized per protocol. He is to return on January 9 2023 at 5am for his next appointment.     Christo Delvalle RN  January 3, 2023

## 2023-01-03 NOTE — PROGRESS NOTES
Palliative Medicine Outpatient Services  [place]: [phone] ([phone])    Patient Name: Rachel Ruiz. YOB: 1975     Date of Current Visit: 01/03/23   Location of Current Visit:    [] [facility] Office  [x] [facility] Office  [] [address] [place] [address]  [] Home  [] Synchronous real-time A/V visit    Date of Initial Visit: 2/19/19   Referral from: Ede Forbes MD  Primary Care Physician: Sierra Vogel MD      SUMMARY:   Rachel Mendoza is a 41y.o. year old with high-grade follicular lymphoma, who was referred to Palliative Medicine by Dr. Roger Maldonado for symptom management and supportive care. He was diagnosed in 11/2018 after presenting with back pain, treated with systemic chemotherapy with complete response. He suffered cardiac arrest during cycle #3 due to previously undiagnosed severe stenosis of the LAD s/p PTCA and stent. He was diagnosed with poorly differentiated carcinoma of the colon (no evidence of metastatic disease) in 2/14/22 and underwent loop ileostomy at [facility] on 2/19/22. He underwent exploratory laparoscopy in 4/2022 which revealed a large tumor at the hepatic flexure peritoneal carcinomatosis. Scans 12/2022 revealed disease progression. He is currently being treated with systemic chemotherapy under the care of Dr. Nirmal Arthur with the goal of palliation of symptoms and prolongation of life. The patients social history includes: he is single. He lives in [place] with his girlfriend, Connor Austin, and their young old son. He has 3 teenage children who live with their mother and with whom he has close contact. 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 Palliative Medicine is addressing the following current patient/family concerns: abdominal pain related to malignancy; anxiety, depression; left mid- and low back pain, poor appetite, fatigue, advanced care planning.     Initial Referral Intake note from Breann Kim RN reviewed prior to visit   PALLIATIVE DIAGNOSES:       ICD-10-CM ICD-9-CM    1. Abdominal pain, generalized  R10.84 789.07 oxyCODONE IR (ROXICODONE) 10 mg tab immediate release tablet      gabapentin (NEURONTIN) 300 mg capsule      2. Neuropathic pain  M79.2 729.2       3. Chronic left-sided low back pain without sciatica  M54.50 724.2     G89.29 338.29       4. Anxiety  F41.9 300.00       5. Poor appetite  R63.0 783.0       6. Other fatigue  R53.83 780.79       7. Nausea without vomiting  R11.0 787.02       8. Colon adenocarcinoma (HCC)  C18.9 153.9 oxyCODONE IR (ROXICODONE) 10 mg tab immediate release tablet      gabapentin (NEURONTIN) 300 mg capsule               PLAN:     Patient Instructions   Dear Rupert Daley. ,    It was a pleasure seeing you today for an office visit. We will see you again in 1 week for an office visit to coordinate with your infusion. If labs or imaging tests have been ordered for you today, please call the office  at [phone] 48 hours after completion to obtain the results. Your described symptoms were: Fatigue: 6       Pain: 6 (Increases to 10/10 with standing)   Anxiety: 10 Nausea: 0   Anorexia: 6       Constipation: No           This is the plan we talked about:    1. Abdominal pain/neuropathic pain  -Continue oxycodone 10-mg: increase to 2 tabs every 4 hours as needed  -Continue extended-release oxycodone to 20-mg every 12 hours  -Start gabapentin 300-mg at bedtime for the neuropathic component of your pain. 2. Low back pain  -Chronic since lymphoma diagnosis with escalation of pain after colon cancer diagnosis  -Continue oxycodone as above    3. Depression/anxiety  -You have chronic anxiety which has increased with the news of your cancer progression and your increased pain  -You've met with our clinical , Alicia Snow, in the past  -Continue Lexapro 20-mg daily  -I'm avoiding benzodiazepines due to synergistic effect with opioids    4.  Poor appetite/weight loss  -Your appetite is poor and you've lost 10 pounds recently  -Today I made a referral to Shanna Singer, the [facility] dietician    6. Fatigue  -This is chronic and unchanged   -You're sleeping about 5 hours at night, except on nights when you take dexamethasone    7. Nausea  -Continue ondansetron 8-mg every 8 hours as needed  -Continue prochlorperazine 10-mg every 6 hours as needed    8. Colon mass  -You're receiving chemotherapy under the care of Dr. Ana Branch  -Your scans on 12/22 showed progressive disease  -You return next Monday for your cancer treatment    This is what you have shared with us about Advance Care Planning:      Primary Decision Maker: Aaron Lynch Girlfriend - [phone]  Advance Care Planning 12/14/2022   Patient's [address] is: Legal Next of Michael [address] Directive Yes, on file   Patient Would Like to Complete Advance Directive -   Does the patient have other document types -           The Palliative Medicine Team is here to support you and your family.        Sincerely,      Ning Keller MD and the Palliative Medicine [facility]     GOALS OF CARE / TREATMENT PREFERENCES:   [====Goals of Care====]  GOALS OF CARE:  Patient / health care proxy stated goals: See Patient Instructions / Summary    TREATMENT PREFERENCES:   Code Status:  [x] Attempt Resuscitation       [] Do Not Attempt Resuscitation    Advance Care Planning:  [x] The Pall Med Interdisciplinary Team has updated the ACP Navigator with Decision Maker and Patient Capacity      Primary Decision Maker: Aaron Lynch Girlfriend - [phone]    [] Named in a scanned document   [x] Legal Next of Kin  [] Guardian    Other:  (If patient appropriate for POST, consider using PALLPOST smart phrase here)    The palliative care team has discussed with patient / health care proxy about goals of care / treatment preferences for patient.  [====Goals of Care====]     PRESCRIPTIONS GIVEN:     Medications Ordered Today   Medications oxyCODONE IR (ROXICODONE) 10 mg tab immediate release tablet     Sig: Take 2 Tablets by mouth every four (4) hours as needed for Pain for up to 10 days. Max Daily Amount: 120 mg. Dispense:  120 Tablet     Refill:  0    gabapentin (NEURONTIN) 300 mg capsule     Sig: Take 1 Capsule by mouth nightly. Max Daily Amount: 300 mg. Indications: neuropathic pain     Dispense:  30 Capsule     Refill:  0            FOLLOW UP:     Future Appointments   Date Time Provider Dahlia Supriya   1/9/2023  7:30 AM [facility]   1/9/2023  8:30 AM Caron Calderon MD PCS [facility]   1/9/2023  8:45 AM Adelaida López MD ONCSF [facility]   1/11/2023  1:30 PM [facility]   1/11/2023  3:00 PM Amos Amador MD CAV[facility]   1/23/2023  7:30 AM [facility]   1/25/2023  1:30 PM SS [facility]   2/6/2023  7:30 AM [facility]   2/8/2023  1:30 PM [facility]           PHYSICIANS INVOLVED IN CARE:   Patient Care Team:  Yvonne Singh MD as PCP - General (Family Medicine)  Adelaida López MD (Hematology and Oncology)  Enriqueta Kayser, MD as Physician (Palliative Medicine)  Enriqueta Kayser, MD as Physician (Palliative Medicine)       HISTORY:   Reviewed patient-completed ESAS and advance care planning form. Reviewed patient record in prescription monitoring program.    CHIEF COMPLAINT:   Chief Complaint   Patient presents with    Abdominal Pain    Other     Giselle-umbilical pain                   HPI/SUBJECTIVE:    The patient is: [x] Verbal / [] Nonverbal     He continues to have burning pain close to his navel and ostomy \"where the scar is. \"  He doesn't have he pain when he sits but he has severe burning pain whenever he stands up. He saw Dr. Omid Bee last week who \"thinks it's my cancer. \"    He's been taking OxyContin every 12 hours and oxycodone IR every 4 to 5 hours.   This helps with his \"other cancer pain\" but doesn't help too much with the burning pain. See Plan/Patient Instructions for additional interval history      From visit 3/2/22:  He started having abdominal pain about a month ago. Then he started feeling nauseous. He had trouble with bowel movements, feeling like he wasn't completing emptying his bowels. He went to the ED on 2/14, CT scan showed mass in his colon. He was hospitalized at [address] [place] [address] 2/15-2/25 for colonoscopy and loop ileostomy. He's still having pain in his abdomen. He's been taking 2 oxycodone every 4 to 6 hours which eases the pain to ~5/10 which is tolerable. He's always anxious. He continues to take Lexapro. He's eating, not as much as he used to. He ate 2 PBJs yesterday. He had mild nausea for a few days after he came home. He's passing formed stool in his ostomy bag daily. Home health is coming to his home. He sees Dr. Eloy Mcallister next week. From IV 2/19/19:  He has a lot of anxiety. He's lived with anxiety for a long time, sometimes it's been worse than others, like when he lost his job and lost his insurance. He took Wellbutrin then which made him more anxious and depressed. He's had more anxiety since he was diagnosed with lymphoma. He's been taking lorazepam and it doesn't help at all, even when he tried taking 2 pills. This is his biggest problem now. He feels depressed but it's not as bad as the anxiety. He has pain in his back, that's what brought him to the hospital in November. He's been taking oxycodone which helps some. The groin pain started after his operation (cardiac cath) in the hospital last week. He expects that will get better as it heals. His pain doesn't bother him too much, not like the anxiety. His appetite is getting better. He's lost ~30# since last fall but that's leveled off now. He's moving his bowels regularly. He sometimes gets short of breath but not as much as used to.      He doesn't have chest pain, never did. He sleeps well at night. He doesn't have the energy to do much during the day. He lives with his father. He sees his three teen-age children frequently (they live with their mother). His children give him a lot of strength.         Clinical Pain Assessment (nonverbal scale for nonverbal patients):   [++++ Clinical Pain Assessment++++]  [++++Pain Severity++++]: Pain: 6  [++++Pain Character++++]: stabbing pain in back  [++++Pain Duration++++]: months for back pain, weeks for groin pain  [++++Pain Effect++++]: little  [++++Pain Factors++++]: oxycodone helps with back pain, groin pain elicited by standing and walking  [++++Pain Frequency++++]: constant back pain with varying intensity  [++++Pain Location++++]: left lower back  [++++ Clinical Pain Assessment++++]    [++++ Clinical Pain Assessment++++]  [++++Pain Severity++++]: Pain: 6  [++++Pain Character++++]: deep, aching  [++++Pain Duration++++]: since 2/2022  [++++Pain Effect++++]: limits function, emotional distress  [++++Pain Factors++++]: unable to identify provoking factors  [++++Pain Frequency++++]: constant  [++++Pain Location++++]: right lower abdomen  [++++ Clinical Pain Assessment++++]    [++++ Clinical Pain Assessment++++]  [++++Pain Severity++++]: 0 with sitting, 10/10 with standing  [++++Pain Character++++]: burning  [++++Pain Duration++++]: months  [++++Pain Effect++++]: limits ambulation/function  [++++Pain Factors++++]:   [++++Pain Frequency++++]: intermittent depending upon body position  [++++Pain Location++++]: shakira-umbilical  [++++ Clinical Pain Assessment++++]      FUNCTIONAL ASSESSMENT:     Palliative Performance Scale (PPS):          PSYCHOSOCIAL/SPIRITUAL SCREENING:     Any spiritual / Anabaptist concerns:  [] Yes /  [x] No    Caregiver Burnout:  [] Yes /  [x] No /  [] No Caregiver Present      Anticipatory grief assessment:   [x] Normal  / [] Maladaptive       ESAS Anxiety: Anxiety: 10    ESAS Depression:         REVIEW OF SYSTEMS:     The following systems were [x] reviewed / [] unable to be reviewed  Systems: constitutional, ears/nose/mouth/throat, respiratory, gastrointestinal, genitourinary, musculoskeletal, integumentary, neurologic, psychiatric, endocrine. Positive findings noted below. Modified ESAS Completed by: provider   Fatigue: 6       Pain: 6   Anxiety: 10 Nausea: 0   Anorexia: 6       Constipation: No              PHYSICAL EXAM:     Wt Readings from Last 3 Encounters:   01/03/23 137 lb (62.1 kg)   01/03/23 137 lb 12.8 oz (62.5 kg)   01/03/23 137 lb 12.8 oz (62.5 kg)   8# weight loss since 11/28/22      Blood pressure 129/82, pulse 83, temperature 97.2 °F (36.2 °C), temperature source Oral, resp. rate 20, height 5' 7\" (1.702 m), weight 137 lb (62.1 kg), SpO2 98 %.   Last bowel movement: See Nursing Note    Constitutional: ill-appearing, appears fatigued  Eyes: pupils equal, anicteric  ENMT: no nasal discharge, moist mucous membranes  Cardiovascular: regular rhythm, no peripheral edema  Respiratory: breathing not labored, symmetric  Gastrointestinal: +bowel sounds; firm masses easily palpable; tenderness with palpation shakira-umbilical area; loose brown stool in ostomy bag  Musculoskeletal: no deformity, no tenderness to palpation  Skin: warm, dry; pale  Neurologic: following commands, moving all extremities  Psychiatric: full affect, no hallucinations  Other:            HISTORY:     Past Medical History:   Diagnosis Date    Anxiety     Anxiety and depression     Cancer ([facility] [address]     lymphoma Nov 2018 receiving chemo    Cancer ([facility] [address] 02/2022    COLON    Chronic pain     lower back- lymphoma    Hyperlipidemia     Hypertension     NO MEDICATION FOR PAST 9 MONTHS    Lymphadenopathy 11/12/2018    Seizures ([address] CHILDHOOD    NEVER TOOK MEDICATION - \"GREW OUT OF THEM\"      Past Surgical History:   Procedure Laterality Date    HX COLONOSCOPY      HX HEART CATHETERIZATION  02/2019    HX ILEOSTOMY      HX OTHER SURGICAL  02/2019 cardiac stent    IR INJECTION PSEUDOANEURYSM  02/26/2019    KS LAPS ABD PRTM&OMENTUM DX W/WO SPEC BR/WA SPX  04/27/2022    Peritoneal biopsy Dr. Mitch Vinson  02/2022    COLON RESECTION WITH ILEOSTOMY    [place] UNLISTED PROCEDURE CARDIAC SURGERY  2019    STENT X1      Family History   Problem Relation Age of Onset    Hypertension Father     Diabetes Father     Cancer Father         PROSTATE    Diabetes Mother     No Known Problems Brother     No Known Problems Brother     Anesth Problems Neg Hx       History reviewed, no pertinent family history. Social History     Tobacco Use    Smoking status: Every Day     Packs/day: 0.50     Years: 20.00     Pack years: 10.00     Types: Cigarettes    Smokeless tobacco: Never    Tobacco comments:     \"STOP SMOKING\" PACKET GIVEN TO PATIENT   Substance Use Topics    Alcohol use: No     No Known Allergies   Current Outpatient Medications   Medication Sig    [START ON 1/10/2023] oxyCODONE IR (ROXICODONE) 10 mg tab immediate release tablet Take 2 Tablets by mouth every four (4) hours as needed for Pain for up to 10 days. Max Daily Amount: 120 mg.    gabapentin (NEURONTIN) 300 mg capsule Take 1 Capsule by mouth nightly. Max Daily Amount: 300 mg. Indications: neuropathic pain    oxyCODONE ER (OxyCONTIN) 20 mg ER tablet Take 1 Tablet by mouth every twelve (12) hours for 30 days. Max Daily Amount: 40 mg.    lidocaine (LIDODERM) 5 % 1 Patch by TransDERmal route every twenty-four (24) hours. Apply patch to the abdomen for 12 hours a day and remove for 12 hours a day. (Patient not taking: Reported on 1/3/2023)    potassium chloride (KLOR-CON M10) 10 mEq tablet Take 1 Tablet by mouth daily. ondansetron hcl (ZOFRAN) 8 mg tablet Take 1 Tablet by mouth every eight (8) hours as needed for Nausea or Vomiting. prochlorperazine (Compazine) 10 mg tablet Take 1 Tablet by mouth every six (6) hours as needed for Nausea or Vomiting.  (Patient not taking: No sig reported)    lidocaine-prilocaine (EMLA) topical cream Apply a dime size amount to port site 30 minutes before treatment to prevent pain. (Patient not taking: No sig reported)    dexAMETHasone (DECADRON) 4 mg tablet Take 8mg (2 x tabs) on days 2 and 3 after treatment to prevent nausea. Take in the AM with food. (Patient not taking: Reported on 1/3/2023)    multivitamin (ONE A DAY) tablet Take 1 Tablet by mouth daily. atorvastatin (LIPITOR) 40 mg tablet TAKE 1 TAB BY MOUTH NIGHTLY. INDICATIONS: TREATMENT TO SLOW PROGRESSION OF CORONARY ARTERY DISEASE (Patient not taking: Reported on 1/3/2023)     No current facility-administered medications for this visit. Facility-Administered Medications Ordered in Other Visits   Medication Dose Route Frequency    sodium chloride (NS) flush 10 mL  10 mL IntraVENous PRN    0.9% sodium chloride injection 10 mL  10 mL IntraVENous PRN    heparin (porcine) pf 300-500 Units  300-500 Units InterCATHeter PRN          LAB DATA REVIEWED:     Lab Results   Component Value Date/Time    WBC 6.7 01/03/2023 07:57 AM    HGB 9.9 (L) 01/03/2023 07:57 AM    PLATELET 86 (L) 98/12/1613 07:57 AM     Lab Results   Component Value Date/Time    Sodium 141 01/03/2023 07:57 AM    Potassium 3.3 (L) 01/03/2023 07:57 AM    Chloride 105 01/03/2023 07:57 AM    CO2 28 01/03/2023 07:57 AM    BUN 5 (L) 01/03/2023 07:57 AM    Creatinine 0.92 01/03/2023 07:57 AM    Calcium 9.1 01/03/2023 07:57 AM    Magnesium 1.7 01/12/2019 04:05 AM    Phosphorus 2.0 (L) 01/12/2019 04:05 AM      Lab Results   Component Value Date/Time    Alk.  phosphatase 387 (H) 01/03/2023 07:57 AM    Protein, total 6.7 01/03/2023 07:57 AM    Albumin 3.0 (L) 01/03/2023 07:57 AM    Globulin 3.7 01/03/2023 07:57 AM     Lab Results   Component Value Date/Time    INR 1.1 02/22/2019 08:18 PM    Prothrombin time 10.8 02/22/2019 08:18 PM    aPTT 27.8 02/22/2019 08:18 PM      Lab Results   Component Value Date/Time    Iron 18 (L) 05/06/2022 11:13 AM    TIBC 173 (L) 05/06/2022 11:13 AM    Iron % saturation 10 (L) 05/06/2022 11:13 AM    Ferritin 426 (H) 05/06/2022 11:13 AM          MRI lumbar spine 12/18/19:  Congenital narrowing of the lumbar canal. Vertebral body heights are maintained. Marrow signal is normal.     The conus medullaris terminates at T12/L1. Signal and caliber of the distal  spinal cord are within normal limits. There is no pathologic intrathecal  enhancement. The paraspinal soft tissues are within normal limits. Lower thoracic spine: No herniation or stenosis. L1-L2: No herniation or stenosis. L2-L3: No herniation or stenosis. L3-L4: Mild facet arthropathy. Minimal central disc protrusion. Mild canal  stenosis. Foramina are patent  L4-L5: Desiccation. Mild facet arthropathy. Canal demonstrates minimal stenosis. There is an annular ligament tear far laterally on the left. Mild left foraminal  stenosis. L5-S1: Mild facet arthropathy. Canal and foramina are patent. IMPRESSION:  Mild disc degenerative change at L3-4 and L4-5. Mild canal stenosis at L3-4 and mild left foraminal stenosis at L4-5. Other less severe degenerative findings are as described above. CT chest/abdomen 12/16/19:  THYROID: No nodule. MEDIASTINUM: No mass or lymphadenopathy. Port catheter in place on the right. Catheter tip in appropriate position. SB: No mass or lymphadenopathy. THORACIC AORTA: No dissection or aneurysm. MAIN PULMONARY ARTERY: Unremarkable  TRACHEA/BRONCHI: Unremarkable  ESOPHAGUS: No wall thickening or dilatation. HEART: Normal in size. PLEURA: No effusion or pneumothorax. LUNGS: Bibasilar atelectasis is noted. LIVER: No mass or biliary dilatation. GALLBLADDER: Layering contrast versus cholelithiasis. SPLEEN: No mass. PANCREAS: No mass or ductal dilatation. ADRENALS: The left adrenal gland is elevated by the retroperitoneal mass. The    right is unremarkable.     KIDNEYS: There is diminished soft tissue density at the level of the left renal  hilum. There is increased left renal cortical atrophy. STOMACH: Unremarkable. SMALL BOWEL: No dilatation or wall thickening. COLON: No dilatation or wall thickening. APPENDIX: Unremarkable. PERITONEUM: No ascites or pneumoperitoneum. RETROPERITONEUM:   Diminished size of retroperitoneal mass. Diminished in size with margins difficult to fully ascertain. 2-62 35 x 50 mm previously 71 x 94 mm.     2-67 left periaortic adenopathy 25 x 17 mm  The SMA, celiac, and LIZZY are remain encased, diminished attenuation of these  vessels. REPRODUCTIVE ORGANS: The prostate and seminal vesicles are unremarkable. URINARY  BLADDER: Unremarkable  BONES: No destructive bone lesion. ADDITIONAL COMMENTS: Resolved left inguinal pseudoaneurysm. [employer] Billing IMPRESSION:    Baseline research examination. Findings are consistent with interval response to therapy. Continued diminished size of retroperitoneal mass-adenopathy,  with diminished soft tissue density at the left renal hilum. CT chest/abdomen/pelvis 2/14/22:  1. Annular mass lesion of the right colon with upstream fecal retention  concerning for primary colon neoplasm versus less likely lymphoma. 2. Soft tissue stranding around celiac axis, SMA, and abdominal aorta may  reflect infiltrative lymphoma. 3. Left renal atrophy with left hydronephrosis likely reflecting chronic  proximal ureteral obstruction. 4. Incidentals as above. CT chest/abdomen/pelvis 7/19/22:  Response to treatment characterized by decrease in size of the apical core  lesion in the proximal transverse colon and decreased mucosal colic  lymphadenopathy. Persistent mesenteric lymphadenopathy and retroperitoneal/mesenteric soft tissue  which may relate to the patient's history of lymphoma. Chronic left renal atrophy with chronic left hydronephrosis. CT chest/abdomen/pelvis 10/10/22:   Increase in peritoneal disease compared to the prior examination. Stable  mesenteric infiltrate. Improvement in the mass lesion involving the transverse  colon. CT chest/abdomen/pelvis 12/29/22:  Progression of disease with increase in size of perihepatic  infiltrative disease, increased in size and number of abdominal metastases, and  new soft tissue metastases in the anterior abdominal wall. Soft tissue  infiltrating around the celiac axis, SMA, and renal arteries and veins is not  significant changed. Incidentals as above with no CT evidence of metastatic  disease to the thorax.       CONTROLLED SUBSTANCES SAFETY ASSESSMENT (IF ON CONTROLLED SUBSTANCES):     Reviewed opioid safety handout:  [x] Yes   [] No  24 hour opioid dose >150mg morphine equivalent/day:  [] Yes   [x] No  Benzodiazepines:  [] Yes   [x] No  Sleep apnea:  [] Yes   [x] No  Urine Toxicology Testing within last 6 months:  [x] Yes   [] No (8/22/22 + as expected for oxycodone/metabolites)  History of or new aberrant medication taking behaviors:  [] Yes   [x] No  Has Narcan been prescribed [x] Yes   [] No          Total time:   Counseling / coordination time:   > 50% counseling / coordination?:

## 2023-01-03 NOTE — PROGRESS NOTES
1. Have you been to the ER, urgent care clinic since your last visit? Hospitalized since your last visit? No    2. Have you seen or consulted any other health care providers outside of the [address] [place] [address] since your last visit? Include any pap smears or colon screening.  No        Visit Vitals  /78   Pulse 93   Temp 98.8 °F (37.1 °C)   Resp 16   Ht 5' 7\" (1.702 m)   Wt 137 lb 12.8 oz (62.5 kg)   SpO2 98%   BMI 21.58 kg/m²            Chief Complaint   Patient presents with    Follow-up    Colon Cancer

## 2023-01-03 NOTE — PROGRESS NOTES
[id number] [facility] On[facility] at [address] [place] [address]  [phone]    Hematology / Oncology Established Visit    Reason for Visit:   Carly Latham is a 41 y.o. male who in follow up of colon cancer. Hematology Oncology Treatment History:     Diagnosis #1: Follicular lymphoma  Stage: IV  Pathology:   11/13/18 right inguinal LN excision: Follicular lymphoma, high-grade (grade 3a of 3). Prior Treatment:   1. Obinutuzumab-CHOP. Obinutuzumab: 1000 mg weekly on days 1, 8, 15 for cycle 1, then 1000 mg on day 1 q21 days for cycles 2-6, then monotherapy 1000 mg every 21 days for cycle 7, 8 with Cyclophosphamide 750mg/m2, Doxorubicin 50mg/m2, Vincristine 1.4mg/m2 on day 1 and Prednisone 100mg on Days 1-5, every 21 days for a total of 2 cycles completed late 1/2019. Regimen discontinued due to STEMI. 2. Obinutuzumab + Bendamustine: 1000 mg Obinutuzumab on day 1 + Bendamustine 90mg/m2 on days 1-2 on a 28-day cycle x 4 cycles, completed 5/2019  Current Treatment: Surveillance      Diagnosis #2: Colon cancer:  Stage: IV  Pathology:    2/19/22 Ascending colon; biopsy: poorly differentiated carcinoma  Comment: No dysplasia is identified. Immunohistochemical stains performed on block 1A, show the tumor positive for Cam5.2 and shows patchy positivity for CDX2 with a Ki-67 index of -90%; the tumor is focally positive for CK5/6 and GATA3; negative for p63, Pax8, CK7, CK20, panmelanoma, synaptophysin and chromogranin. The immunophenotypic features are nonspecific but argues somewhat against the following carcinoma: urothelial, neuroendocrine and breast. The immunophenotypic features also argues against melanoma and somewhat against classic colorectal carcinoma. Additional immunohistochemical stains to exclude the possibility of prostate and hepatocellular carcinoma have been   ordered; the results will be reported as an addendum.   -MLH1/MSH2/MSH6/PMS2 all intact with no loss of nuclear expression of MMR proteins - no MSI   Addendum: The addendum is issued to report the results of additional immunohistochemical stains in an attempt to ascertain the primary site of the carcinoma. The diagnosis is unchanged. Hepar and glypican 3 immunohistochemical stains are neg, arguing against hepatocellular carcinoma. PSA stain is negative, arguing against prostatic primary. Imaging studies to eval for poss primary sites maybe useful. In the absence of carcinoma/tumor at any other sites, this may represent an undifferentiated carcinoma of the colon.  -Oncogenomics (molecular) studies: POLE< ARID1A, YVONNE, ATR, BRCA2, CHECK1, FANCI, NF1, PIK3CA, PTCH1, PTEN, RAD50, RAD51, RB1, SMARCA4, STK11    Prior Treatment:   1. Loop ileostomy 2/19/22  2. Diagnostic laparoscopy with peritoneal biopsy, 4/27/22  3. FOLFOXIRI every 2 weeks until disease progression or toxicity, 5/16/22 - 12/2022. Current Treatment: FOLFIRI + Panitumumab (6 mg/kg) every 14 days, to start 1/9/23     Oncologic History:  Was in his usual state of health in early November 2018 when he developed low back pain and presented to the ER. Work-up at outside hospital revealed a retroperitoneal mass seen on CT imaging, and he was transferred to [facility] for further work-up. CT there showed a large retroperitoneal mass encircling the aorta with invasion of the left renal hilum and left adrenal gland. There were bilateral inguinal lymph nodes and moderate left hydronephrosis. He was evaluated while at [facility] and was noted to have palpable nodes in his groin for approximately the past 1 month. He underwent excisional LN biopsy of right inguinal LN, which revealed follicular lymphoma. At time of diagnosis, he had no cardiac disease aside from HTN, hyperlipidemia. However, he did have an NSTEMI after cycle 2 of O-CHOP. Was likely unrelated to chemotherapy, but opted to switch treatment to Obinutuzumab-Bendamustine.  He completed treatment in 5/2019 and had a CR based on PET.    History of Present Illness:   Mr. Darinel Lockhart is a 41 y.o. male with is seen for follow up of colon cancer C1 of FOLFIRI + Panitumumab. He states his mid abdominal pain still persists despite an increase in Oxycodone IR to 20mg prn. Pt is having normal stools. No blood in stools. No fevers. PMHx: Lymphoma in 2018, CAD, HTN, Hyperlipidemia, Anxiety, chronic low back pain  PSurgHx: None aside from cardiac stent placement and port placement  SHx: Smokes 1/2ppd x past 20 yrs. Works part time as a cook. Has 2 children. FHx: Father had leukemia, passed away from pancreatic cancer. Review of Systems: A complete review of systems was obtained, negative except as described above. Physical Exam:     Visit Vitals  /76   Pulse 92   Temp 98.7 °F (37.1 °C)   Resp 16   Ht 5' 7\" (1.702 m)   Wt 138 lb (62.6 kg)   SpO2 100%   BMI 21.61 kg/m²     ECOG PS: 0  General: Well developed, no acute distress  Eyes: anicteric sclerae  HENT: Atraumatic  Neck: Supple  Lymphatic: deferred today   Respiratory: CTAB, normal respiratory effort  CV: Normal rate, regular rhythm, no murmurs, no peripheral edema  GI: Tenderness to palpation of umbilicus and RUQ. Hard mass palpated RUQ and above the umbilicus. Colostomy in right lower quadrant with distention due to parastomal hernia  MS: Normal gait and station. Digits without clubbing or cyanosis. Skin: No rashes, ecchymoses, or petechiae. Normal temperature, turgor, and texture. Neuro/Psych: Alert, oriented. Appropriate affect, normal judgment/insight. Results:     Lab Results   Component Value Date/Time    WBC 6.6 01/09/2023 08:09 AM    HGB 10.0 (L) 01/09/2023 08:09 AM    HCT 31.4 (L) 01/09/2023 08:09 AM    PLATELET 635 (L) 87/42/3182 08:09 AM    .0 (H) 01/09/2023 08:09 AM    ABS.  NEUTROPHILS 4.6 01/09/2023 08:09 AM    Hemoglobin (POC) 15.0 06/05/2009 02:13 PM    Hematocrit (POC) 39 02/14/2019 01:24 PM     Lab Results   Component Value Date/Time    Sodium 137 2023 08:09 AM    Potassium 3.5 2023 08:09 AM    Chloride 106 2023 08:09 AM    CO2 27 2023 08:09 AM    Glucose 132 (H) 2023 08:09 AM    BUN 7 2023 08:09 AM    Creatinine 0.85 2023 08:09 AM    GFR est AA >60 10/03/2022 07:50 AM    GFR est non-AA >60 10/03/2022 07:50 AM    Calcium 9.4 2023 08:09 AM    Sodium (POC) 136 2019 01:24 PM    Potassium (POC) 3.9 2019 01:24 PM    Chloride (POC) 102 2019 01:24 PM    Glucose (POC) 249 (H) 02/15/2019 10:21 PM    BUN (POC) 14 2019 01:24 PM    Creatinine (POC) 0.9 2019 01:24 PM    Calcium, ionized (POC) 1.24 2019 01:24 PM     Lab Results   Component Value Date/Time    Bilirubin, total 0.6 2023 08:09 AM    ALT (SGPT) 22 2023 08:09 AM    Alk. phosphatase 380 (H) 2023 08:09 AM    Protein, total 6.6 2023 08:09 AM    Albumin 3.0 (L) 2023 08:09 AM    Globulin 3.6 2023 08:09 AM     Lab Results   Component Value Date/Time    Iron 18 (L) 2022 11:13 AM    TIBC 173 (L) 2022 11:13 AM    Iron % saturation 10 (L) 2022 11:13 AM    Ferritin 426 (H) 2022 11:13 AM       No results found for: B12LT, FOL, RBCF  Lab Results   Component Value Date/Time    TSH 1.53 2016 04:40 AM     Lab Results   Component Value Date/Time    CEA 31.0 2022 08:20 AM         18:       Labs at [facility]:  22: CA 19-9 = 390  22: CEA = 12.3  22: CEA = 19.2  22: CEA = 17.9   22: CEA = 6  22: CEA = 6.8  22: CEA = 10.8  10/3/22: CEA 10.1  22: CEA = 21  22: CEA = 31  23:     CEA = 43 (Started FOLFIRI + Panitumumab)    Signatera MRD:  22: 295.97 MTM/mL  22: 2.98  22: 0.88   10/24/22: 37.87  22: 159.91    Imagin/9/18 Abd/pelvis CT: IMPRESSION:  1. Interval development of a large retroperitoneal mass encircling the aorta with invasion of the left renal hilum and left adrenal gland.  Several adjacent lymph nodes are seen extending into the peritoneum and underneath the  diaphragmatic natalie. This most likely represents lymphoma. 2. Several new bilateral enlarged inguinal lymph nodes also likely representing lymphoma. 3. Moderate left hydronephrosis with a delayed renal nephrogram related to decreased renal function. This is related to the invasion of the renal hilum. 11/11/18 Chest CT: IMPRESSION:  Trace left pleural effusion. Bilateral lower lobe atelectasis. Large  retroperitoneal mass lesion again demonstrated. PET 12/18/18:  FINDINGS:  HEAD/NECK: Right palatine tonsil intense hypermetabolism, max SUV 18. Multilevel  bilateral cervical adenopathy, with max SUV 12 in a left supraclavicular node. Cerebral evaluation is limited by normal intense activity. CHEST: Solitary hypermetabolic left axillary node, max SUV 11. ABDOMEN/PELVIS: Bulky retroperitoneal mass max SUV 27, with several additional  small active abdomino-pelvic nodes. Bilateral inguinal nodes with max SUV 12 on  the left. SKELETON: No foci of abnormal hypermetabolism in the axial and visualized  appendicular skeleton. IMPRESSION:   1. Right palatine tonsil tumor involvement (Deauville 5). 2. Bilateral cervical delaney involvement (Deauville 5). 3. Left axillary node involvement (Deauville 5). 4. Bulky retroperitoneal lymphoma mass and additional smaller hypermetabolic  abdomino-pelvic nodes (Deauville 5). 5. Bilateral inguinal delaney involvement (Deauville 5). Deauville Five Point Scale  1. No uptake or no residual uptake (when used interim)  2. Slight uptake, but below blood pool (mediastinum)  3. Uptake above mediastinal, but below/equal to uptake in the liver  4. Uptake slightly to moderately higher than liver  5. Markedly increased uptake or any new lesion (on response evaluation)  Each FDG-avid (or previously FDG avid) lesion is rated independently.   Reference values:  Mediastinal blood pool: 2.1 SUV  Liver (background): 2.2 SUV    PET/CT 2/05/19:   IMPRESSION:  1. No Foci of Abnormal Hypermetabolism (Deauville 1). 2. Resolved activity in the right palatine tonsil, bilateral cervical nodes,left axillary node, retroperitoneal/abdominal pelvic adenopathy, bilateral inguinal nodes. Echo 2/14/19:  Normal cavity size, wall thickness and systolic function (ejection fraction normal). The muscle mass is normal. The cavity shape is normal. The estimated ejection fraction is 41 - 45%. Abnormal wall motion as described on the wall scoring diagram below. End-systolic volume is normal. Normal left ventricular strain. There is mild (grade 1) left ventricular diastolic dysfunction. Normal left ventricular diastolic pressure. End-diastolic volume is normal.    LE arterial duplex 2/22/19:  There is evidence of left groin pseudoaneurysm noted arising from distal common femoral artery, pseudo lobe measures 2.32cm x 2.58cm and pseudo neck length measuring 0.63cm. There is no evidence of hemodynamically significant left lower extremity arterial obstruction. JACLYN is 1.03 on the right and 1.02 on the left. LE arterial duplex s/p Thrombin Injection to Pseudoaneurysm 2/26/19:  Successful thrombin injection procedure of the left groin with no further flow seen. No evidence of hemodnyamically significant obstruction in the left lower extremity. Left lower extremity arterial duplex performed. Confirmed pseudoaneurysm in left groin with small neck. Following thrombin injection, no further flow seen in the pseudoaneurysm. The left common femoral, profunda femoral, femoral, popliteal, posterior tibial and anterior arteries were imaged. Mainly triphasic flow was seen with no evidence of significantly elevated velocities. Repeat LE arterial duplex 2/27/19:  Continued thrombosed left groin pseudoaneurysm following thrombin injection on 02/26/2019. No flow or color fill is identified. The hematoma measures approximately 2.1 x 2.9 cm in diameter. The common femoral, deep femoral, femoral, and popliteal arteries are patent with mainly tri-phasic flow and no significant hemodynamically obstruction is noted. Stress 5/31/19:  Normal stress myocardial perfusion without ischemia or infact at 84% MPHR. Normal LV function. LVEF 60%. No EKG changes of ischemia at peak exercise. Normal functional capacity. PET 6/03/19: IMPRESSION: No Foci of Abnormal Hypermetabolism (Deauville 1). -MRI lumbar spine 12/18/19:  Mild disc degenerative change at L3-4 and L4-5. Mild canal stenosis at L3-4 and mild left foraminal stenosis at L4-5. Other less severe degenerative findings are as described above. Continued diminished size of retroperitoneal mass-adenopathy,  with diminished soft tissue density at the left renal hilum. -CT chest/abdomen 12/16/19:  Findings are consistent with interval response to therapy    CT c/a/p 5/28/20: IMPRESSION:  Further contraction of the retroperitoneal mantle and chris mesentery,  compatible with treatment response  Stable left hilar soft tissue mass  Slight increased splaying of the duodenum and proximal jejunum is the result, without obstruction  No evidence for recurrence of lymphoma in the chest, abdomen, or pelvis    CT c/a/p 2/13/22:  FINDINGS:   LOWER THORAX: Dependent atelectasis with otherwise clear lungs. The visualized  heart is normal in size without pericardial effusion. LIVER: No mass. BILIARY TREE: Gallbladder is unremarkable. CBD is not dilated. SPLEEN: Unremarkable. PANCREAS: No mass or ductal dilatation. ADRENALS: Unremarkable. KIDNEYS: Atrophic left kidney with mild left hydronephrosis. Right kidney is  unremarkable with no stone, enhancing mass, or other renal abnormality. STOMACH: Unremarkable. SMALL BOWEL: No dilatation or wall thickening.   COLON: Circumferential wall thickening at the hepatic flexure with luminal  narrowing, adjacent mesenteric stranding, and fluid with upstream retention in  the cecum and fecalization of distal ileal contents. No dilation or other wall  thickening. APPENDIX: Unremarkable. PERITONEUM: Moderate free fluid with no pneumoperitoneum. RETROPERITONEUM: Soft tissue stranding around the celiac and SMA and associated aorta with no discrete mass or adenopathy. Aorta is normal in size without aneurysm or dissection. REPRODUCTIVE ORGANS: Prostate and seminal vesicles appear unremarkable. URINARY BLADDER: No mass or calculus. BONES: No destructive bone lesion. ABDOMINAL WALL: No mass or hernia. ADDITIONAL COMMENTS: N/A  IMPRESSION  1. Annular mass lesion of the right colon with upstream fecal retention  concerning for primary colon neoplasm versus less likely lymphoma. 2. Soft tissue stranding around celiac axis, SMA, and abdominal aorta may  reflect infiltrative lymphoma. 3. Left renal atrophy with left hydronephrosis likely reflecting chronic  proximal ureteral obstruction. 4. Incidentals as above. 2/24/22 CT abd/pelvis at [facility]:  FINDINGS: An enteric tube with sidehole beyond the level of the lower esophageal sphincter is seen looping on itself in the proximal stomach before terminating in the mid stomach. Status post right lower quadrant diverting ileostomy for an obstructing colonic mass. Multiple loops of gas dilated small bowel remain, with a maximal diameter of 5.4 cm whereas previously measured 3.6 cm. Dilute contrast is seen within the lateral left abdominal dilated small bowel. Moderate stool burden and bowel gas opacifies the cecum, measuring 7.3 cm in diameter. No definite supine evidence of pneumoperitoneum. Lung bases: Limited evaluation of the lung bases demonstrates a cardiac silhouette within normal limits for size. A small bore central venous catheter is seen terminating in the right atrium. No focal consolidation or pleural effusion. 2/24/22 CT abd/pelvis [facility]:  IMPRESSION:  1. Status post right lower quadrant loop ileostomy.  Mild diffuse dilation of small bowel proximal to the ostomy in the lower abdomen and upper pelvis concerning for at least mild to moderate partial bowel obstruction. Relatively decompressed loop ileostomy. 2. Mildly complex pelvic fluid collection in the rectovesical space, decreased in size from prior. 3. Redemonstrated ascending colon mass and findings suspicious for regional metastatic disease. 4. Redemonstrated soft tissue in the retroperitoneum with prominent lymph nodes, similar to prior examination. Differential would include metastasis, retroperitoneal fibrosis. 5. Mild atherosclerosis. 6. Subcentimeter right renal hypodensity, too small to characterize. 7. Severe compression of the left renal vein, similar to prior examination. 8. Additional findings as described above. Bones: No acute osseous abnormality. 2/24/22 CT chest [facility]:  IMPRESSION:  FINAL report. 1. No evidence of metastatic disease to the chest.  2. No enlarged lymph nodes. 3. Increasing volume loss in the lung bases which may be due to atelectasis. 4. CT abdomen and pelvis reported separately. 2/25/22 Colonoscopy at [facility]:  Impression:            - Preparation of the colon was inadequate. - Stool in the entire examined colon. - Likely malignant completely obstructing tumor at the                         hepatic flexure. Biopsied.                        - Malignant-appearing tumor in the colon. Complications:         No immediate complications. 7/19/22 CT ch/abd/pelv:  IMPRESSION  Response to treatment characterized by decrease in size of the apical core  lesion in the proximal transverse colon and decreased mucosal colic  lymphadenopathy. Persistent mesenteric lymphadenopathy and retroperitoneal/mesenteric soft tissue  which may relate to the patient's history of lymphoma. Chronic left renal atrophy with chronic left hydronephrosis.   RECIST:  Target lesions:  Transverse colon mass, series 2, image 75, 27 x 26 mm, previously 49 x 45 mm. Nontarget lesions:  Transverse mesocolic lymph nodes, decreased. 10/10/22 CT ch/abd/pelv:  IMPRESSION  Increase in peritoneal disease compared to the prior examination. Stable  mesenteric infiltrate. Improvement in the mass lesion involving the transverse  colon. RECIST:  Target lesion:  Transverse colon mass, series 2, image 76, 1.7 x 1.3 cm, previously 2.7 x 2.6cm. Nontarget lesions:   Transverse mesial colic lymph nodes unchanged. 12/30/22 CT ch/abd/pelvis:  IMPRESSION  Progression of disease with increase in size of perihepatic  infiltrative disease, increased in size and number of abdominal metastases, and  new soft tissue metastases in the anterior abdominal wall. Soft tissue  infiltrating around the celiac axis, SMA, and renal arteries and veins is not  significant changed. Incidentals as above with no CT evidence of metastatic  disease to the thorax. Assessment & Plan:   Valdemar Frankel. is a 41 y.o. male comes in for evaluation and management of lymphoma. 1. Undifferentiated carcinoma of transverse colon, metastatic, KRAS/NRAS status unclear:  SUZANNE  S/p diverting ileostomy due to large bowel obstruction. Colonoscopy with biopsy on 2/25/22. No obvious metastatic disease on CT imaging, but did have obvious metastatic disease to peritoneum during laparoscopy with Dr. Quoc Patiño. I recommended FOLFOXIRI every 2 weeks. Will not give Bevacizumab given h/o MI and tobacco use. [employer] suggests: BRCA2 and YVONNE mutations support use of Olaparib or similar PARP inhibitor in future. They also suggest testing for TMB, PDL1 to determine utility of checkpoint inhibitors. CT after C9 showed good response with decrease in size of colon mass, but increased peritoneal implants on 10/10/22.   Future treatment options include Keytruda/Nivolumab, Olaparib. (KRAS wild type, TMB high suggested on [facility] pathology specimen)  Supportive medications: zofran, compazine, dexamethasone, EMLA topical  -- Referred to [facility] for consideration of short course palliative RT to painful abdominal metastasis. -- Proceed with C1 FOLFIRI + Panitumumab. Consent signed today. -- Follow up PDL1 result from [facility]  -- Follow up PDL1, KRAS/NRAS/BRAF from peritoneal biopsy specimen 4/2022.   -- Check CEA every 8 weeks  -- Return in 2 weeks for C2 FOLFIRI + Ricci, MD/NP visit. 2. H/o Follicular lymphoma:   Grade 3a with bulky disease encircling the aorta, causing pain. Bone marrow negative. BR better than RCHOP, but based on GALLIUM study, Obinutuzumab-based induction and maintenance prolongs PFS over that seen with rituximab-based therapy. Therefore, pt started on O-CHOP regimen. Pt achieved CR after C2, but was switched to BR x 4 cycles given STEMI. Completed treatment 5/2019. End of treatment PET 6/3/19 showed CR. No extra surveillance needed at this time given metastatic colon cancer with frequent imaging. Current imaging shows slight increase in soft tissue density in deep pelvis on 10/2022. Will continue to monitor for colon cancer follow up imaging. 3. Chronic lumbar back pain / anxiety / RLQ / acute right upper quadrant and umbilical pain:   Left lower back pain is chronic/stable. RLQ is likely related to neoplasm/colostomy/parastomal hernia. Evaluated by Dr. Bob Webber who believes umbilical burning pain is due to tumor implants. Pain moderately managed on Oxycontin 20mg q12h, oxycodone 20mg q4h prn. Following with Dr. Rafael Parmar. -- Advised he discuss increasing pain medication with Dr. Rafael Parmar. -- Referral to [facility] to discuss palliative radiation to new tumor implants. 4. CAD / HTN:   h/o STEMI 2/14/19 with TOM placed to proximal LAD. Following with Dr. Broderick Blunt and remains on dual antiplatelet therapy, high dose Lipitor, Metoprolol, ACEI. Received only 2 doses of cardiotoxic chemo, Doxorubicin in early 1/2019.  Is overdue for follow up with Dr. Broderick Blunt.   -- Not currently taking Metoprolol or ACEI. Referred back to Dr. Ander Melendez since he is overdue for follow up. Scheduled 1/11/23.     5. Tobacco abuse:   Previously discussed smoking cessation strategies and benefits. Pt has tried quitting in the past and is not interested. 6. BRCA2 mutation:  Pt would benefit from genetic counseling for himself and his family. 7. Chemotherapy induced neutropenia:  Intermittent. Neulasta on-body added with C3-4, but discontinued due to insurance denial. Kami Jania added with pump removal starting with C13. Given patient is unable to return the next day for Kami Jania, will proceed with giving it on the day of pump removal.     8. Chemotherapy induced thrombocytopenia:  Mild. Monitor for bleeding. Goals of care: Disease is not curable, but is treatable to improve quality and duration of life. I personally provided the service today.      Signed By: Jayna Sagastume MD

## 2023-01-04 RX ORDER — FLUOROURACIL 50 MG/ML
400 INJECTION, SOLUTION INTRAVENOUS ONCE
OUTPATIENT
Start: 2023-01-09 | End: 2023-01-09

## 2023-01-04 RX ORDER — ACETAMINOPHEN 325 MG/1
650 TABLET ORAL AS NEEDED
Start: 2023-01-09

## 2023-01-04 RX ORDER — SODIUM CHLORIDE 9 MG/ML
5-250 INJECTION, SOLUTION INTRAVENOUS AS NEEDED
OUTPATIENT
Start: 2023-01-09

## 2023-01-04 RX ORDER — SODIUM CHLORIDE 0.9 % (FLUSH) 0.9 %
5-40 SYRINGE (ML) INJECTION AS NEEDED
OUTPATIENT
Start: 2023-01-09

## 2023-01-04 RX ORDER — EPINEPHRINE 1 MG/ML
0.3 INJECTION, SOLUTION, CONCENTRATE INTRAVENOUS AS NEEDED
OUTPATIENT
Start: 2023-01-09

## 2023-01-04 RX ORDER — DEXTROSE MONOHYDRATE 50 MG/ML
5-250 INJECTION, SOLUTION INTRAVENOUS AS NEEDED
OUTPATIENT
Start: 2023-01-09

## 2023-01-04 RX ORDER — ALBUTEROL SULFATE 0.83 MG/ML
2.5 SOLUTION RESPIRATORY (INHALATION) AS NEEDED
Start: 2023-01-09

## 2023-01-04 RX ORDER — DIPHENHYDRAMINE HYDROCHLORIDE 50 MG/ML
50 INJECTION, SOLUTION INTRAMUSCULAR; INTRAVENOUS AS NEEDED
Start: 2023-01-09

## 2023-01-04 RX ORDER — SODIUM CHLORIDE 9 MG/ML
5-40 INJECTION INTRAMUSCULAR; INTRAVENOUS; SUBCUTANEOUS AS NEEDED
OUTPATIENT
Start: 2023-01-09

## 2023-01-04 RX ORDER — PALONOSETRON 0.05 MG/ML
0.25 INJECTION, SOLUTION INTRAVENOUS ONCE
OUTPATIENT
Start: 2023-01-09 | End: 2023-01-09

## 2023-01-04 RX ORDER — ONDANSETRON 2 MG/ML
8 INJECTION INTRAMUSCULAR; INTRAVENOUS AS NEEDED
OUTPATIENT
Start: 2023-01-09

## 2023-01-04 RX ORDER — ATROPINE SULFATE 0.4 MG/ML
0.4 INJECTION, SOLUTION ENDOTRACHEAL; INTRAMEDULLARY; INTRAMUSCULAR; INTRAVENOUS; SUBCUTANEOUS
OUTPATIENT
Start: 2023-01-09

## 2023-01-04 RX ORDER — HYDROCORTISONE SODIUM SUCCINATE 100 MG/2ML
100 INJECTION, POWDER, FOR SOLUTION INTRAMUSCULAR; INTRAVENOUS AS NEEDED
OUTPATIENT
Start: 2023-01-09

## 2023-01-04 RX ORDER — DIPHENHYDRAMINE HYDROCHLORIDE 50 MG/ML
25 INJECTION, SOLUTION INTRAMUSCULAR; INTRAVENOUS AS NEEDED
Start: 2023-01-09

## 2023-01-04 RX ORDER — HEPARIN 100 UNIT/ML
500 SYRINGE INTRAVENOUS AS NEEDED
Start: 2023-01-09

## 2023-01-05 ENCOUNTER — HOSPITAL ENCOUNTER (OUTPATIENT)
Dept: INFUSION THERAPY | Age: 48
End: 2023-01-05
Payer: MEDICAID

## 2023-01-05 ENCOUNTER — APPOINTMENT (OUTPATIENT)
Dept: INFUSION THERAPY | Age: 48
End: 2023-01-05

## 2023-01-05 RX ORDER — HEPARIN 100 UNIT/ML
500 SYRINGE INTRAVENOUS AS NEEDED
Status: CANCELLED
Start: 2023-01-11

## 2023-01-05 RX ORDER — SODIUM CHLORIDE 9 MG/ML
5-40 INJECTION INTRAVENOUS AS NEEDED
Status: CANCELLED | OUTPATIENT
Start: 2023-01-11

## 2023-01-05 RX ORDER — SODIUM CHLORIDE 0.9 % (FLUSH) 0.9 %
5-40 SYRINGE (ML) INJECTION AS NEEDED
Status: CANCELLED | OUTPATIENT
Start: 2023-01-11

## 2023-01-05 RX ORDER — SODIUM CHLORIDE 9 MG/ML
5-250 INJECTION, SOLUTION INTRAVENOUS AS NEEDED
Status: CANCELLED | OUTPATIENT
Start: 2023-01-11

## 2023-01-06 ENCOUNTER — TELEPHONE (OUTPATIENT)
Dept: PALLATIVE CARE | Age: 48
End: 2023-01-06

## 2023-01-06 NOTE — TELEPHONE ENCOUNTER
Called patient to advise/confirm upcoming appt with Dr. Glen Reid on 1/9/23 at 8:30 at [facility]. Spoke with Isaak Adams and confirmed appointment. Also advised to please bring in your 's License and Insurance Card and any pain medications in the original container with you to appointment.

## 2023-01-09 ENCOUNTER — TELEPHONE (OUTPATIENT)
Dept: PALLATIVE CARE | Age: 48
End: 2023-01-09

## 2023-01-09 ENCOUNTER — OFFICE VISIT (OUTPATIENT)
Dept: ONCOLOGY | Age: 48
End: 2023-01-09

## 2023-01-09 ENCOUNTER — HOSPITAL ENCOUNTER (OUTPATIENT)
Dept: INFUSION THERAPY | Age: 48
Discharge: HOME | End: 2023-01-09
Payer: MEDICAID

## 2023-01-09 ENCOUNTER — APPOINTMENT (OUTPATIENT)
Dept: INFUSION THERAPY | Age: 48
End: 2023-01-09
Payer: MEDICAID

## 2023-01-09 VITALS
OXYGEN SATURATION: 100 % | RESPIRATION RATE: 16 BRPM | SYSTOLIC BLOOD PRESSURE: 117 MMHG | HEART RATE: 92 BPM | WEIGHT: 138 LBS | DIASTOLIC BLOOD PRESSURE: 76 MMHG | TEMPERATURE: 98.7 F | HEIGHT: 67 IN | BODY MASS INDEX: 21.66 KG/M2

## 2023-01-09 VITALS
SYSTOLIC BLOOD PRESSURE: 130 MMHG | BODY MASS INDEX: 21.67 KG/M2 | WEIGHT: 138.1 LBS | OXYGEN SATURATION: 97 % | TEMPERATURE: 98.7 F | HEART RATE: 74 BPM | RESPIRATION RATE: 16 BRPM | DIASTOLIC BLOOD PRESSURE: 75 MMHG | HEIGHT: 67 IN

## 2023-01-09 DIAGNOSIS — Z51.11 CHEMOTHERAPY MANAGEMENT, ENCOUNTER FOR: ICD-10-CM

## 2023-01-09 DIAGNOSIS — T45.1X5A CHEMOTHERAPY INDUCED NEUTROPENIA (HCC): Primary | ICD-10-CM

## 2023-01-09 DIAGNOSIS — D70.1 CHEMOTHERAPY INDUCED NEUTROPENIA (HCC): Primary | ICD-10-CM

## 2023-01-09 DIAGNOSIS — C18.4 CARCINOMA OF TRANSVERSE COLON (HCC): Primary | ICD-10-CM

## 2023-01-09 DIAGNOSIS — K43.5 PARASTOMAL HERNIA WITHOUT OBSTRUCTION OR GANGRENE: ICD-10-CM

## 2023-01-09 DIAGNOSIS — R10.11 RUQ ABDOMINAL PAIN: ICD-10-CM

## 2023-01-09 DIAGNOSIS — I25.2 HISTORY OF ST ELEVATION MYOCARDIAL INFARCTION (STEMI): ICD-10-CM

## 2023-01-09 DIAGNOSIS — Z76.89 PREVENTION OF CHEMOTHERAPY-INDUCED NEUTROPENIA: ICD-10-CM

## 2023-01-09 DIAGNOSIS — D69.6 THROMBOCYTOPENIA (HCC): ICD-10-CM

## 2023-01-09 DIAGNOSIS — C18.9 COLON ADENOCARCINOMA (HCC): ICD-10-CM

## 2023-01-09 LAB
ALBUMIN SERPL-MCNC: 3 G/DL (ref 3.5–5)
ALBUMIN/GLOB SERPL: 0.8 (ref 1.1–2.2)
ALP SERPL-CCNC: 380 U/L (ref 45–117)
ALT SERPL-CCNC: 22 U/L (ref 12–78)
ANION GAP SERPL CALC-SCNC: 4 MMOL/L (ref 5–15)
AST SERPL-CCNC: 22 U/L (ref 15–37)
BASOPHILS # BLD: 0.1 K/UL (ref 0–0.1)
BASOPHILS NFR BLD: 1 % (ref 0–1)
BILIRUB SERPL-MCNC: 0.6 MG/DL (ref 0.2–1)
BUN SERPL-MCNC: 7 MG/DL (ref 6–20)
BUN/CREAT SERPL: 8 (ref 12–20)
CALCIUM SERPL-MCNC: 9.4 MG/DL (ref 8.5–10.1)
CEA SERPL-MCNC: 43.2 NG/ML
CHLORIDE SERPL-SCNC: 106 MMOL/L (ref 97–108)
CO2 SERPL-SCNC: 27 MMOL/L (ref 21–32)
CREAT SERPL-MCNC: 0.85 MG/DL (ref 0.7–1.3)
DIFFERENTIAL METHOD BLD: ABNORMAL
EOSINOPHIL # BLD: 0.1 K/UL (ref 0–0.4)
EOSINOPHIL NFR BLD: 2 % (ref 0–7)
ERYTHROCYTE [DISTWIDTH] IN BLOOD BY AUTOMATED COUNT: 15.9 % (ref 11.5–14.5)
GLOBULIN SER CALC-MCNC: 3.6 G/DL (ref 2–4)
GLUCOSE SERPL-MCNC: 132 MG/DL (ref 65–100)
HCT VFR BLD AUTO: 31.4 % (ref 36.6–50.3)
HGB BLD-MCNC: 10 G/DL (ref 12.1–17)
IMM GRANULOCYTES # BLD AUTO: 0 K/UL (ref 0–0.04)
IMM GRANULOCYTES NFR BLD AUTO: 1 % (ref 0–0.5)
LYMPHOCYTES # BLD: 1.1 K/UL (ref 0.8–3.5)
LYMPHOCYTES NFR BLD: 16 % (ref 12–49)
MCH RBC QN AUTO: 32.2 PG (ref 26–34)
MCHC RBC AUTO-ENTMCNC: 31.8 G/DL (ref 30–36.5)
MCV RBC AUTO: 101 FL (ref 80–99)
MONOCYTES # BLD: 0.7 K/UL (ref 0–1)
MONOCYTES NFR BLD: 11 % (ref 5–13)
NEUTS SEG # BLD: 4.6 K/UL (ref 1.8–8)
NEUTS SEG NFR BLD: 70 % (ref 32–75)
NRBC # BLD: 0 K/UL (ref 0–0.01)
NRBC BLD-RTO: 0 PER 100 WBC
PLATELET # BLD AUTO: 120 K/UL (ref 150–400)
PMV BLD AUTO: 11.7 FL (ref 8.9–12.9)
POTASSIUM SERPL-SCNC: 3.5 MMOL/L (ref 3.5–5.1)
PROT SERPL-MCNC: 6.6 G/DL (ref 6.4–8.2)
RBC # BLD AUTO: 3.11 M/UL (ref 4.1–5.7)
SODIUM SERPL-SCNC: 137 MMOL/L (ref 136–145)
WBC # BLD AUTO: 6.6 K/UL (ref 4.1–11.1)

## 2023-01-09 PROCEDURE — 3074F SYST BP LT 130 MM HG: CPT | Performed by: INTERNAL MEDICINE

## 2023-01-09 PROCEDURE — 96415 CHEMO IV INFUSION ADDL HR: CPT

## 2023-01-09 PROCEDURE — 36415 COLL VENOUS BLD VENIPUNCTURE: CPT

## 2023-01-09 PROCEDURE — 96413 CHEMO IV INFUSION 1 HR: CPT

## 2023-01-09 PROCEDURE — 96411 CHEMO IV PUSH ADDL DRUG: CPT

## 2023-01-09 PROCEDURE — 74011250636 HC RX REV CODE- 250/636: Performed by: INTERNAL MEDICINE

## 2023-01-09 PROCEDURE — 85025 COMPLETE CBC W/AUTO DIFF WBC: CPT

## 2023-01-09 PROCEDURE — 96368 THER/DIAG CONCURRENT INF: CPT

## 2023-01-09 PROCEDURE — 74011000258 HC RX REV CODE- 258: Performed by: INTERNAL MEDICINE

## 2023-01-09 PROCEDURE — 80053 COMPREHEN METABOLIC PANEL: CPT

## 2023-01-09 PROCEDURE — 96416 CHEMO PROLONG INFUSE W/PUMP: CPT

## 2023-01-09 PROCEDURE — 82378 CARCINOEMBRYONIC ANTIGEN: CPT

## 2023-01-09 PROCEDURE — 96367 TX/PROPH/DG ADDL SEQ IV INF: CPT

## 2023-01-09 PROCEDURE — 77030016057 HC NDL HUBR APOL -B

## 2023-01-09 PROCEDURE — 3078F DIAST BP <80 MM HG: CPT | Performed by: INTERNAL MEDICINE

## 2023-01-09 PROCEDURE — 99215 OFFICE O/P EST HI 40 MIN: CPT | Performed by: INTERNAL MEDICINE

## 2023-01-09 PROCEDURE — 96375 TX/PRO/DX INJ NEW DRUG ADDON: CPT

## 2023-01-09 PROCEDURE — 96417 CHEMO IV INFUS EACH ADDL SEQ: CPT

## 2023-01-09 RX ORDER — SODIUM CHLORIDE 9 MG/ML
5-250 INJECTION, SOLUTION INTRAVENOUS AS NEEDED
Status: DISPENSED | OUTPATIENT
Start: 2023-01-09 | End: 2023-01-09

## 2023-01-09 RX ORDER — HEPARIN 100 UNIT/ML
500 SYRINGE INTRAVENOUS AS NEEDED
Status: ACTIVE | OUTPATIENT
Start: 2023-01-09 | End: 2023-01-09

## 2023-01-09 RX ORDER — SODIUM CHLORIDE 0.9 % (FLUSH) 0.9 %
5-40 SYRINGE (ML) INJECTION AS NEEDED
Status: DISPENSED | OUTPATIENT
Start: 2023-01-09 | End: 2023-01-09

## 2023-01-09 RX ORDER — SODIUM CHLORIDE 9 MG/ML
5-40 INJECTION INTRAVENOUS AS NEEDED
Status: ACTIVE | OUTPATIENT
Start: 2023-01-09 | End: 2023-01-09

## 2023-01-09 RX ORDER — PALONOSETRON 0.05 MG/ML
0.25 INJECTION, SOLUTION INTRAVENOUS ONCE
Status: COMPLETED | OUTPATIENT
Start: 2023-01-09 | End: 2023-01-09

## 2023-01-09 RX ORDER — DEXTROSE MONOHYDRATE 50 MG/ML
5-250 INJECTION, SOLUTION INTRAVENOUS AS NEEDED
Status: DISPENSED | OUTPATIENT
Start: 2023-01-09 | End: 2023-01-09

## 2023-01-09 RX ORDER — FLUOROURACIL 50 MG/ML
400 INJECTION, SOLUTION INTRAVENOUS ONCE
Status: COMPLETED | OUTPATIENT
Start: 2023-01-09 | End: 2023-01-09

## 2023-01-09 RX ADMIN — PANITUMUMAB 372.6 MG: 400 SOLUTION INTRAVENOUS at 10:42

## 2023-01-09 RX ADMIN — DEXAMETHASONE SODIUM PHOSPHATE 12 MG: 4 INJECTION, SOLUTION INTRA-ARTICULAR; INTRALESIONAL; INTRAMUSCULAR; INTRAVENOUS; SOFT TISSUE at 10:10

## 2023-01-09 RX ADMIN — FLUOROURACIL 684 MG: 50 INJECTION, SOLUTION INTRAVENOUS at 13:43

## 2023-01-09 RX ADMIN — IRINOTECAN HYDROCHLORIDE 308 MG: 20 INJECTION, SOLUTION INTRAVENOUS at 11:54

## 2023-01-09 RX ADMIN — SODIUM CHLORIDE 25 ML/HR: 9 INJECTION, SOLUTION INTRAVENOUS at 10:10

## 2023-01-09 RX ADMIN — DEXTROSE MONOHYDRATE 25 ML/HR: 50 INJECTION, SOLUTION INTRAVENOUS at 11:50

## 2023-01-09 RX ADMIN — PALONOSETRON 0.25 MG: 0.05 INJECTION, SOLUTION INTRAVENOUS at 10:09

## 2023-01-09 RX ADMIN — LEUCOVORIN CALCIUM 684 MG: 200 INJECTION, POWDER, LYOPHILIZED, FOR SUSPENSION INTRAMUSCULAR; INTRAVENOUS at 11:53

## 2023-01-09 RX ADMIN — FLUOROURACIL 4104 MG: 50 INJECTION, SOLUTION INTRAVENOUS at 13:46

## 2023-01-09 NOTE — TELEPHONE ENCOUNTER
Palliative Medicine  Nursing Note  [phone]  Fax [phone]      Telephone Call  Patient Name: Sivakumar Diaz. YOB: [birth year]        Primary Decision Maker: Nav Cruz - Girlfriend - [phone]   Advance Care Planning 1/9/2023   Patient's Healthcare Decision Maker is: Legal Next of Kin   Confirm Advance Directive None   Patient Would Like to Complete Advance Directive -   Does the patient have other document types -       Call returned to Mr. Guerline Marrero. His wife answered, but he was heard in the background answering questions. They are inquiring about the Prior Auth needed for the Oxycodone IR 10mg and when he might be able to pick it up. Discussed that the PA has been done and palliative is waiting for the response from insurance. He has a prescription at his pharmacy that is available for tomorrow. Palliative is anticipating that the insurance company will approve and he will only pay his regular co-pay versus needing to pay out of pocket. They verbalized understanding of this and were appreciative.      Burt Ordonez RN  Palliative Medicine

## 2023-01-09 NOTE — TELEPHONE ENCOUNTER
Prior authorization initiated for Oxycodone IR 10 mg #120 for 10 days via covermymeds. Awaiting insurers decision.

## 2023-01-09 NOTE — PROGRESS NOTES
[facility] Progress Note    Date: 2023    Name: Kaye Mandel. MRN: [id number]         : 1975    Mr. Marika Savage Arrived ambulatory and in no distress for C1D1 of Folfiri and panitumumab Regimen. Assessment was completed, no acute issues at this time, patient having pain in abdominal area. R chest wall port accessed without difficulty, labs drawn & sent for processing by ADA grissom. Chemotherapy Flowsheet 2023   Cycle C1D1   Date 2023   Drug / Regimen Folfiri + Vectibix   Post Hydration -   Pre Meds -   Notes -        Patient proceed to appointment with Dr. Simran Alfaro. Mr. Jean Jordan vitals were reviewed. Visit Vitals  /76   Pulse 92   Temp 98.7 °F (37.1 °C)   Resp 16   Ht 5' 7\" (1.702 m)   Wt 62.6 kg (138 lb 1.6 oz)   SpO2 100%   BMI 21.63 kg/m²       Lab results were obtained and reviewed. Recent Results (from the past 12 hour(s))   CBC WITH AUTOMATED DIFF    Collection Time: 23  8:09 AM   Result Value Ref Range    WBC 6.6 4.1 - 11.1 K/uL    RBC 3.11 (L) 4.10 - 5.70 M/uL    HGB 10.0 (L) 12.1 - 17.0 g/dL    HCT 31.4 (L) 36.6 - 50.3 %    .0 (H) 80.0 - 99.0 FL    MCH 32.2 26.0 - 34.0 PG    MCHC 31.8 30.0 - 36.5 g/dL    RDW 15.9 (H) 11.5 - 14.5 %    PLATELET 054 (L) 064 - 400 K/uL    MPV 11.7 8.9 - 12.9 FL    NRBC 0.0 0  WBC    ABSOLUTE NRBC 0.00 0.00 - 0.01 K/uL    NEUTROPHILS 70 32 - 75 %    LYMPHOCYTES 16 12 - 49 %    MONOCYTES 11 5 - 13 %    EOSINOPHILS 2 0 - 7 %    BASOPHILS 1 0 - 1 %    IMMATURE GRANULOCYTES 1 (H) 0.0 - 0.5 %    ABS. NEUTROPHILS 4.6 1.8 - 8.0 K/UL    ABS. LYMPHOCYTES 1.1 0.8 - 3.5 K/UL    ABS. MONOCYTES 0.7 0.0 - 1.0 K/UL    ABS. EOSINOPHILS 0.1 0.0 - 0.4 K/UL    ABS. BASOPHILS 0.1 0.0 - 0.1 K/UL    ABS. IMM.  GRANS. 0.0 0.00 - 0.04 K/UL    DF AUTOMATED     METABOLIC PANEL, COMPREHENSIVE    Collection Time: 23  8:09 AM   Result Value Ref Range    Sodium 137 136 - 145 mmol/L    Potassium 3.5 3.5 - 5.1 mmol/L    Chloride 106 97 - 108 mmol/L CO2 27 21 - 32 mmol/L    Anion gap 4 (L) 5 - 15 mmol/L    Glucose 132 (H) 65 - 100 mg/dL    BUN 7 6 - 20 MG/DL    Creatinine 0.85 0.70 - 1.30 MG/DL    BUN/Creatinine ratio 8 (L) 12 - 20      eGFR >60 >60 ml/min/1.73m2    Calcium 9.4 8.5 - 10.1 MG/DL    Bilirubin, total 0.6 0.2 - 1.0 MG/DL    ALT (SGPT) 22 12 - 78 U/L    AST (SGOT) 22 15 - 37 U/L    Alk.  phosphatase 380 (H) 45 - 117 U/L    Protein, total 6.6 6.4 - 8.2 g/dL    Albumin 3.0 (L) 3.5 - 5.0 g/dL    Globulin 3.6 2.0 - 4.0 g/dL    A-G Ratio 0.8 (L) 1.1 - 2.2         Medications:    Medications Administered       0.9% sodium chloride infusion       Admin Date  01/09/2023 Action  New Bag Dose  25 mL/hr Rate  25 mL/hr Route  IntraVENous Administered By  Raji Rafael              dexamethasone (DECADRON) 12 mg in 0.9% sodium chloride 50 mL IVPB       Admin Date  01/09/2023 Action  New Bag Dose  12 mg Route  IntraVENous Administered By  Raji Rafael              dextrose 5% infusion       Admin Date  01/09/2023 Action  New Bag Dose  25 mL/hr Rate  25 mL/hr Route  IntraVENous Administered By  Raji Rafael              fluorouraciL (ADRUCIL) 4,104 mg in 0.9% sodium chloride 100 mL CADD Cassette       Admin Date  01/09/2023 Action  New Bag Dose  4,104 mg Rate  2.2 mL/hr Route  IntraVENous Administered By  Raji Rafael              fluorouraciL (ADRUCIL) chemo syringe 684 mg       Admin Date  01/09/2023 Action  Given Dose  684 mg Rate  273.6 mL/hr Route  IntraVENous Administered By  Raji Rafael              irinotecan (CAMPTOSAR) 308 mg in dextrose 5% 250 mL, overfill volume 25 mL chemo infusion       Admin Date  01/09/2023 Action  New Bag Dose  308 mg Rate  193.6 mL/hr Route  IntraVENous Administered By  Raji Rafael              leucovorin (WELLCOVORIN) 684 mg in dextrose 5% 250 mL, overfill volume 25 mL IVPB       Admin Date  01/09/2023 Action  New Bag Dose  684 mg Rate  206.1 mL/hr Route  IntraVENous Administered By  Raji Hall palonosetron HCl (ALOXI) injection 0.25 mg       Admin Date  01/09/2023 Action  Given Dose  0.25 mg Route  IntraVENous Administered By  Brandon Foots              panitumumab (VECTIBIX) 372.6 mg in 0.9% sodium chloride 100 mL, overfill volume 10 mL infusion       Admin Date  01/09/2023 Action  New Bag Dose  372.6 mg Rate  128.6 mL/hr Route  IntraVENous Administered By  Brandon Foots                        Two nurses verified prior to administering: Drug name, drug dose, infusion volume or drug volume when prepared in a syringe, rate of administration, route of administration,  expiration dates and/or times, appearance and physical integrity of the drugs, rate set on infusion pump, when used sequencing of drug administration. Mr. Jonn Pandya tolerated treatment well and was discharged from [place] [address] in stable condition. He is to return on January 11  for his next appointment.     Deland Eisenmenger  January 9, 2023

## 2023-01-09 NOTE — PROGRESS NOTES
Danna Brar is a 41 y.o. male follow up for colon cancer. 1. Have you been to the ER, urgent care clinic since your last visit? Hospitalized since your last visit?no     2. Have you seen or consulted any other health care providers outside of the [address] [place] [address] since your last visit? Include any pap smears or colon screening.  No    Vitals 1/9/2023   Blood Pressure 117/76   Pulse 92   Temp 98.7   Resp 16   Height 5' 7\"   Weight 138 lb 1.6 oz   SpO2 100   BSA 1.72 m2   BMI 21.63 kg/m2

## 2023-01-10 ENCOUNTER — TELEPHONE (OUTPATIENT)
Dept: PALLATIVE CARE | Age: 48
End: 2023-01-10

## 2023-01-10 NOTE — TELEPHONE ENCOUNTER
Palliative Medicine  Nursing Note  [phone])  Fax [phone]      Telephone Call  Patient Name: Stephanie Eason. YOB: 1975    1/10/2023        Primary Decision Maker: Sal Castnao - Girlfriend - [phone]   Advance Care Planning 1/9/2023   Patient's Healthcare Decision Maker is: Legal Next of Kin   Confirm Advance Directive None   Patient Would Like to Complete Advance Directive -   Does the patient have other document types -     Call placed to pharmacy to inquire about new oxycodone IR prescription. Pharmacist stated that Mr. Issa Mccormick picked up a 15 day supply on 1/3/23 so it's too early to refill. Discussed that palliative doubled his dose using 1/3/23 script which would end today. Change in dose is reflective on his new prescription directions from Dr. Thania Lopes to take 2 tabs of Oxycodone 10mg IR every 4 hours. Pharmacist verbalized understanding and will fill that for Mr. Issa Mccormick today. Call placed to Mr. Issa Mccormick and informed of the above. He was appreciative.     oMra To RN  Palliative Medicine

## 2023-01-11 ENCOUNTER — OFFICE VISIT (OUTPATIENT)
Dept: CARDIOLOGY CLINIC | Age: 48
End: 2023-01-11
Payer: MEDICAID

## 2023-01-11 ENCOUNTER — APPOINTMENT (OUTPATIENT)
Dept: INFUSION THERAPY | Age: 48
End: 2023-01-11

## 2023-01-11 ENCOUNTER — HOSPITAL ENCOUNTER (OUTPATIENT)
Dept: INFUSION THERAPY | Age: 48
Discharge: HOME | End: 2023-01-11
Payer: MEDICAID

## 2023-01-11 VITALS
RESPIRATION RATE: 16 BRPM | SYSTOLIC BLOOD PRESSURE: 130 MMHG | OXYGEN SATURATION: 99 % | TEMPERATURE: 97.7 F | HEART RATE: 80 BPM | DIASTOLIC BLOOD PRESSURE: 77 MMHG

## 2023-01-11 VITALS
DIASTOLIC BLOOD PRESSURE: 88 MMHG | OXYGEN SATURATION: 98 % | HEIGHT: 67 IN | SYSTOLIC BLOOD PRESSURE: 120 MMHG | WEIGHT: 135 LBS | HEART RATE: 78 BPM | BODY MASS INDEX: 21.19 KG/M2

## 2023-01-11 DIAGNOSIS — I21.3 ST ELEVATION MYOCARDIAL INFARCTION (STEMI), UNSPECIFIED ARTERY (HCC): ICD-10-CM

## 2023-01-11 DIAGNOSIS — T45.1X5A CHEMOTHERAPY INDUCED NEUTROPENIA (HCC): Primary | ICD-10-CM

## 2023-01-11 DIAGNOSIS — Z76.89 ESTABLISHING CARE WITH NEW DOCTOR, ENCOUNTER FOR: ICD-10-CM

## 2023-01-11 DIAGNOSIS — E78.5 HYPERLIPIDEMIA, UNSPECIFIED HYPERLIPIDEMIA TYPE: ICD-10-CM

## 2023-01-11 DIAGNOSIS — Z76.89 PREVENTION OF CHEMOTHERAPY-INDUCED NEUTROPENIA: ICD-10-CM

## 2023-01-11 DIAGNOSIS — I10 PRIMARY HYPERTENSION: ICD-10-CM

## 2023-01-11 DIAGNOSIS — C18.9 COLON ADENOCARCINOMA (HCC): ICD-10-CM

## 2023-01-11 DIAGNOSIS — I25.10 CORONARY ARTERY DISEASE INVOLVING NATIVE CORONARY ARTERY OF NATIVE HEART, UNSPECIFIED WHETHER ANGINA PRESENT: Primary | ICD-10-CM

## 2023-01-11 DIAGNOSIS — D70.1 CHEMOTHERAPY INDUCED NEUTROPENIA (HCC): Primary | ICD-10-CM

## 2023-01-11 PROCEDURE — 74011250636 HC RX REV CODE- 250/636: Performed by: INTERNAL MEDICINE

## 2023-01-11 PROCEDURE — 96523 IRRIG DRUG DELIVERY DEVICE: CPT

## 2023-01-11 PROCEDURE — 74011000250 HC RX REV CODE- 250: Performed by: INTERNAL MEDICINE

## 2023-01-11 RX ORDER — SODIUM CHLORIDE 9 MG/ML
5-40 INJECTION INTRAVENOUS AS NEEDED
Status: DISCONTINUED | OUTPATIENT
Start: 2023-01-11 | End: 2023-01-12 | Stop reason: HOSPADM

## 2023-01-11 RX ORDER — ATORVASTATIN CALCIUM 20 MG/1
20 TABLET, FILM COATED ORAL DAILY
Qty: 90 TABLET | Refills: 4 | Status: SHIPPED | OUTPATIENT
Start: 2023-01-11

## 2023-01-11 RX ORDER — SODIUM CHLORIDE 0.9 % (FLUSH) 0.9 %
5-40 SYRINGE (ML) INJECTION AS NEEDED
Status: DISCONTINUED | OUTPATIENT
Start: 2023-01-11 | End: 2023-01-12 | Stop reason: HOSPADM

## 2023-01-11 RX ORDER — HEPARIN 100 UNIT/ML
500 SYRINGE INTRAVENOUS AS NEEDED
Status: DISCONTINUED | OUTPATIENT
Start: 2023-01-11 | End: 2023-01-12 | Stop reason: HOSPADM

## 2023-01-11 RX ORDER — SODIUM CHLORIDE 9 MG/ML
5-250 INJECTION, SOLUTION INTRAVENOUS AS NEEDED
Status: DISCONTINUED | OUTPATIENT
Start: 2023-01-11 | End: 2023-01-12 | Stop reason: HOSPADM

## 2023-01-11 RX ADMIN — Medication 10 ML: at 14:05

## 2023-01-11 RX ADMIN — HEPARIN 500 UNITS: 100 SYRINGE at 14:05

## 2023-01-11 NOTE — PROGRESS NOTES
All orders entered per verbal orders of Dr. Therese Teague. MD Marjorie  Echo for LVEF. History of MI. Next few days. ASA 81mg po daily. Lipitor 20mg po daily. See Dr. Yonathan Yoder in 6 months. Refill per VO of Dr. Kennedy Memory:    Last appt: 11/1/2022    Future Appointments   Date Time Provider Dahlia Epps   1/23/2023  7:30 AM SS INF2 CH2 >7H [facility]   1/23/2023  8:15 AM Rossana BROOKS MD ONCSF BS AMB   1/23/2023  9:30 AM Zev Adams MD PCS BS AMB   1/25/2023  1[facility]30 [facility]   2/2/2023  8:00 AM OLGA MARX BS AMB   2/6/2023  7:30 AM SS INF2 CH2 >7H [facility]   2/8/2023  1:30 PM SS INF6 CH4 <1H [facility]   7/12/2023  3:00 PM Therese Amador MD CAVSF BS AMB       Requested Prescriptions     Pending Prescriptions Disp Refills    atorvastatin (LIPITOR) 20 mg tablet 90 Tablet 4     Sig: Take 1 Tablet by mouth daily.

## 2023-01-11 NOTE — PROGRESS NOTES
Reason to see patient: CAD    Referring: Lianne Gonzalez MD    HPI: Calista Gilmore. is a 41 y.o. male with past medical history significant for follicular lymphoma diagnosed in 2018 and was on doxorubicin in the past and had declining EF to 45% and also had non-ST elevation MI. He underwent LAD stenting of a 95% lesion in 2019 by Dr. Georgina Mackay at [facility]. He recovered from CAD. He followed with me until 2019 and since then has not been seen. In February 2022 he was diagnosed with colon cancer. He had a colostomy bag but no resection could be performed. He has been going through chemotherapy since then. He was asked to follow-up with me for continued CAD management. Currently has no symptoms of chest pain, shortness of breath, palpitations, lightheadedness or dizziness. EKG shows sinus rhythm, normal axis, normal intervals, normal ST segment. Plan:    1. History of non-ST elevation MI/LAD PCI February 2019/ of RCA. Currently not taking his aspirin and statins. We will resume aspirin 81 mg p.o. daily as well as Lipitor 20 mg p.o. daily. His echocardiogram was from 2019. We will check another echocardiogram for surveillance. 2.  History of colon cancer currently undergoing chemotherapy    3. History of follicular lymphoma    4. Hypertension: Blood pressure is normal.  Continue to monitor. 5.  Dyslipidemia: Resume Lipitor 20mg po daily. 7. See Dr. Shiv Bolanos in 6 months. ATTENTION:   This medical record was transcribed using an electronic medical records/speech recognition system. Although proofread, it may and can contain electronic, spelling and other errors. Corrections may be executed at a later time. Please feel free to contact us for any clarifications as needed.       Past Medical History:   Diagnosis Date    Anxiety     Anxiety and depression     Cancer [facility])     lymphoma Nov 2018 receiving chemo    Cancer ([facility] 02/2022    COLON    Chronic pain     lower back- lymphoma    Hyperlipidemia     Hypertension     NO MEDICATION FOR PAST 9 MONTHS    Lymphadenopathy 11/12/2018    Seizures (HCC) CHILDHOOD    NEVER TOOK MEDICATION - \"GREW OUT OF THEM\"            Past Surgical History:   Procedure Laterality Date    HX COLONOSCOPY      HX HEART CATHETERIZATION  02/2019    HX ILEOSTOMY      HX OTHER SURGICAL  02/2019    cardiac stent    IR INJECTION PSEUDOANEURYSM  02/26/2019    OR LAPS ABD PRTM&OMENTUM DX W/WO SPEC BR/WA SPX  04/27/2022    Peritoneal biopsy Dr. Olivo Ranks  02/2022    COLON RESECTION WITH ILEOSTOMY    OR UNLISTED PROCEDURE CARDIAC SURGERY  2019    STENT X1             Family History   Problem Relation Age of Onset    Hypertension Father     Diabetes Father     Cancer Father         PROSTATE    Diabetes Mother     No Known Problems Brother     No Known Problems Brother     Anesth Problems Neg Hx            Social History     Socioeconomic History    Marital status:      Spouse name: Not on file    Number of children: 2    Years of education: 11    Highest education level: 11th grade   Occupational History     Comment: resturant manager,   Tobacco Use    Smoking status: Every Day     Packs/day: 0.50     Years: 20.00     Pack years: 10.00     Types: Cigarettes    Smokeless tobacco: Never    Tobacco comments:     \"STOP SMOKING\" PACKET GIVEN TO PATIENT   Vaping Use    Vaping Use: Never used   Substance and Sexual Activity    Alcohol use: No    Drug use: No    Sexual activity: Yes   Other Topics Concern     Service No    Blood Transfusions No    Caffeine Concern Yes    Occupational Exposure No    Hobby Hazards No    Sleep Concern No    Stress Concern No    Weight Concern No    Special Diet No    Back Care No    Exercise No    Bike Helmet No    Seat Belt No    Self-Exams No   Social History Narrative    Not on file     Social Determinants of Health     Financial Resource Strain: Not on file   Food Insecurity: Not on file   Transportation Needs: Not on file   Physical Activity: Not on file   Stress: Not on file   Social Connections: Not on file   Intimate Partner Violence: Not on file   Housing Stability: Not on file         No Known Allergies         Current Outpatient Medications   Medication Sig Dispense Refill    oxyCODONE IR (ROXICODONE) 10 mg tab immediate release tablet Take 2 Tablets by mouth every four (4) hours as needed for Pain for up to 10 days. Max Daily Amount: 120 mg. 120 Tablet 0    gabapentin (NEURONTIN) 300 mg capsule Take 1 Capsule by mouth nightly. Max Daily Amount: 300 mg. Indications: neuropathic pain 30 Capsule 0    oxyCODONE ER (OxyCONTIN) 20 mg ER tablet Take 1 Tablet by mouth every twelve (12) hours for 30 days. Max Daily Amount: 40 mg. 60 Tablet 0    lidocaine (LIDODERM) 5 % 1 Patch by TransDERmal route every twenty-four (24) hours. Apply patch to the abdomen for 12 hours a day and remove for 12 hours a day. 10 Each 1    potassium chloride (KLOR-CON M10) 10 mEq tablet Take 1 Tablet by mouth daily. 90 Tablet 1    ondansetron hcl (ZOFRAN) 8 mg tablet Take 1 Tablet by mouth every eight (8) hours as needed for Nausea or Vomiting. 30 Tablet 2    prochlorperazine (Compazine) 10 mg tablet Take 1 Tablet by mouth every six (6) hours as needed for Nausea or Vomiting. 30 Tablet 2    lidocaine-prilocaine (EMLA) topical cream Apply a dime size amount to port site 30 minutes before treatment to prevent pain. 30 g 2    dexAMETHasone (DECADRON) 4 mg tablet Take 8mg (2 x tabs) on days 2 and 3 after treatment to prevent nausea. Take in the AM with food. 30 Tablet 3    multivitamin (ONE A DAY) tablet Take 1 Tablet by mouth daily. atorvastatin (LIPITOR) 40 mg tablet TAKE 1 TAB BY MOUTH NIGHTLY.  INDICATIONS: TREATMENT TO SLOW PROGRESSION OF CORONARY ARTERY DISEASE 90 Tab 3     Facility-Administered Medications Ordered in Other Visits   Medication Dose Route Frequency Provider Last Rate Last Admin    sodium chloride (NS) flush 5-40 mL  5-40 mL IntraVENous PRN Nicolas BROOKS MD        0.9% sodium chloride injection 5-40 mL  5-40 mL IntraVENous PRN Nicolas BROOKS MD   10 mL at 01/11/23 1405    0.9% sodium chloride infusion  5-250 mL/hr IntraVENous PRN Jodie Kern MD        heparin (porcine) pf 500 Units  500 Units InterCATHeter PRN Jodie Kern MD   500 Units at 01/11/23 1405    alteplase (CATHFLO) 2 mg in sterile water (preservative free) 2 mL injection  2 mg InterCATHeter PRN Jodie Kern MD            ROS:  12 point review of systems was performed.  All negative except for HPI     Physical Exam:  Visit Vitals  /88 (BP 1 Location: Left upper arm, BP Patient Position: Sitting)   Pulse 78   Ht 5' 7\" (1.702 m)   Wt 135 lb (61.2 kg)   SpO2 98%   BMI 21.14 kg/m²       Gen:  Well-developed, well-nourished, in no acute distress  HEENT:  Pink conjunctivae, PERRL, hearing intact to voice, moist mucous membranes  Neck:  Supple, without masses, thyroid non-tender  Resp:  No accessory muscle use, clear breath sounds without wheezes rales or rhonchi  Card:  No murmurs, normal S1, S2 without thrills, bruits or peripheral edema  Abd:  Soft, non-tender, non-distended, normoactive bowel sounds are present, no palpable organomegaly and no detectable hernias  Lymph:  No cervical or inguinal adenopathy  Musc:  No cyanosis or clubbing  Skin:  No rashes or ulcers, skin turgor is good  Neuro:  Cranial nerves are grossly intact, no focal motor weakness, follows commands appropriately  Psych:  Good insight, oriented to person, place and time, alert     Labs:     Lab Results   Component Value Date/Time    WBC 6.6 01/09/2023 08:09 AM    HGB 10.0 (L) 01/09/2023 08:09 AM    Hemoglobin (POC) 15.0 06/05/2009 02:13 PM    HCT 31.4 (L) 01/09/2023 08:09 AM    Hematocrit (POC) 39 02/14/2019 01:24 PM    PLATELET 878 (L) 44/74/3897 08:09 AM    .0 (H) 01/09/2023 08:09 AM     Lab Results   Component Value Date/Time Hemoglobin A1c 5.3 04/20/2022 03:25 PM    Hemoglobin A1c 5.6 09/28/2016 04:40 AM    Glucose 132 (H) 01/09/2023 08:09 AM    Glucose (POC) 249 (H) 02/15/2019 10:21 PM    LDL, calculated 96.4 09/28/2016 04:40 AM    Creatinine (POC) 0.9 02/14/2019 01:24 PM    Creatinine 0.85 01/09/2023 08:09 AM      Lab Results   Component Value Date/Time    Cholesterol, total 170 09/28/2016 04:40 AM    HDL Cholesterol 23 09/28/2016 04:40 AM    LDL, calculated 96.4 09/28/2016 04:40 AM    Triglyceride 253 (H) 09/28/2016 04:40 AM    CHOL/HDL Ratio 7.4 (H) 09/28/2016 04:40 AM     Lab Results   Component Value Date/Time    ALT (SGPT) 22 01/09/2023 08:09 AM    Alk.  phosphatase 380 (H) 01/09/2023 08:09 AM    Bilirubin, direct 0.1 09/28/2016 04:40 AM    Bilirubin, total 0.6 01/09/2023 08:09 AM    Albumin 3.0 (L) 01/09/2023 08:09 AM    Protein, total 6.6 01/09/2023 08:09 AM    INR 1.1 02/22/2019 08:18 PM    Prothrombin time 10.8 02/22/2019 08:18 PM    PLATELET 093 (L) 76/49/0853 08:09 AM    Hepatitis B surface Ag <0.10 05/16/2022 08:16 AM    AFP, Serum, Tumor Marker 4.1 11/10/2018 03:41 AM     Lab Results   Component Value Date/Time    INR 1.1 02/22/2019 08:18 PM    INR 1.0 11/09/2018 09:49 PM    INR 1.0 07/30/2018 07:46 AM    Prothrombin time 10.8 02/22/2019 08:18 PM    Prothrombin time 9.5 11/09/2018 09:49 PM    Prothrombin time 9.4 07/30/2018 07:46 AM      Lab Results   Component Value Date/Time    GFR est non-AA >60 10/03/2022 07:50 AM    GFRNA, POC >60 02/14/2019 01:24 PM    GFR est AA >60 10/03/2022 07:50 AM    GFRAA, POC >60 02/14/2019 01:24 PM    Creatinine 0.85 01/09/2023 08:09 AM    Creatinine (POC) 0.9 02/14/2019 01:24 PM    BUN 7 01/09/2023 08:09 AM    BUN (POC) 14 02/14/2019 01:24 PM    Sodium 137 01/09/2023 08:09 AM    Sodium (POC) 136 02/14/2019 01:24 PM    Potassium 3.5 01/09/2023 08:09 AM    Potassium (POC) 3.9 02/14/2019 01:24 PM    Chloride 106 01/09/2023 08:09 AM    Chloride (POC) 102 02/14/2019 01:24 PM    CO2 27 01/09/2023 08:09 AM    Magnesium 1.7 01/12/2019 04:05 AM    Phosphorus 2.0 (L) 01/12/2019 04:05 AM     No results found for: PSA, PSA2, PSAR1, Skip Harjeet, [id number], [id number]  Lab Results   Component Value Date/Time    TSH 1.53 09/28/2016 04:40 AM      Lab Results   Component Value Date/Time    Glucose 132 (H) 01/09/2023 08:09 AM    Glucose (POC) 249 (H) 02/15/2019 10:21 PM      Lab Results   Component Value Date/Time    Troponin-I, Qt. <0.05 03/28/2019 09:29 AM      No results found for: BNP, BNPP, BNPPPOC, XBNPT, BNPNT   Lab Results   Component Value Date/Time    Sodium 137 01/09/2023 08:09 AM    Potassium 3.5 01/09/2023 08:09 AM    Chloride 106 01/09/2023 08:09 AM    CO2 27 01/09/2023 08:09 AM    Anion gap 4 (L) 01/09/2023 08:09 AM    Glucose 132 (H) 01/09/2023 08:09 AM    BUN 7 01/09/2023 08:09 AM    Creatinine 0.85 01/09/2023 08:09 AM    BUN/Creatinine ratio 8 (L) 01/09/2023 08:09 AM    GFR est AA >60 10/03/2022 07:50 AM    GFR est non-AA >60 10/03/2022 07:50 AM    Calcium 9.4 01/09/2023 08:09 AM      Lab Results   Component Value Date/Time    Sodium 137 01/09/2023 08:09 AM    Potassium 3.5 01/09/2023 08:09 AM    Chloride 106 01/09/2023 08:09 AM    CO2 27 01/09/2023 08:09 AM    Anion gap 4 (L) 01/09/2023 08:09 AM    Glucose 132 (H) 01/09/2023 08:09 AM    BUN 7 01/09/2023 08:09 AM    Creatinine 0.85 01/09/2023 08:09 AM    BUN/Creatinine ratio 8 (L) 01/09/2023 08:09 AM    GFR est AA >60 10/03/2022 07:50 AM    GFR est non-AA >60 10/03/2022 07:50 AM    Calcium 9.4 01/09/2023 08:09 AM    Bilirubin, total 0.6 01/09/2023 08:09 AM    ALT (SGPT) 22 01/09/2023 08:09 AM    Alk.  phosphatase 380 (H) 01/09/2023 08:09 AM    Protein, total 6.6 01/09/2023 08:09 AM    Albumin 3.0 (L) 01/09/2023 08:09 AM    Globulin 3.6 01/09/2023 08:09 AM    A-G Ratio 0.8 (L) 01/09/2023 08:09 AM      Lab Results   Component Value Date/Time    Hemoglobin A1c 5.3 04/20/2022 03:25 PM         No results for input(s): CPK, CKMB, TROIQ in the last 72 hours. No lab exists for component: CKQMB, CPKMB        Problem List:     Problem List  Date Reviewed: 1/9/2023            Codes Class Noted    Chemotherapy induced neutropenia ([facility] ICD-10-CM: D70.1, T45.1X5A  ICD-9-CM: 288.03, E933.1  6/13/2022        Prevention of chemotherapy-induced neutropenia ICD-10-CM: Z76.89  ICD-9-CM: V72.85  6/13/2022        Colon adenocarcinoma ([facility] ICD-10-CM: C18.9  ICD-9-CM: 153.9  4/27/2022        Hepatic flexure mass ICD-10-CM: K63.89  ICD-9-CM: 569.89  2/20/2022    Overview Signed 3/8/2022  3:36 PM by Kelby Pringle MD     Last Assessment & Plan:   Formatting of this note might be different from the original.  - GI consulted for further workup, colonoscopy yesterday, follow up pathology  - CT chest 2/23 for staging - prelim with no concern for mets  - CEA high at 12,  -CRS consult today to establish care  -Med Onc consult today to establish care             Iron deficiency anemia due to chronic blood loss ICD-10-CM: D50.0  ICD-9-CM: 280.0  2/16/2022        Coronary artery disease involving native coronary artery ICD-10-CM: I25.10  ICD-9-CM: 414.01  9/2/2021        History of cardiac arrest ICD-10-CM: Z86.74  ICD-9-CM: V12.53  9/2/2021        Hyperkeratosis ICD-10-CM: L85.9  ICD-9-CM: 701.1  10/27/2020        Chronic left-sided low back pain without sciatica ICD-10-CM: M54.50, G89.29  ICD-9-CM: 724.2, 338.29  9/5/2019        Pseudoaneurysm ([facility] ICD-10-CM: I72.9  ICD-9-CM: 442.9  2/26/2019        Anxiety ICD-10-CM: F41.9  ICD-9-CM: 300.00  9/6/2597        Follicular lymphoma ([facility] ICD-10-CM: C82.90  ICD-9-CM: 202.00  11/16/2018        Hyperlipidemia ICD-10-CM: E78.5  ICD-9-CM: 272.4  Unknown        HTN (hypertension) ICD-10-CM: I10  ICD-9-CM: 401.9  9/27/2016        Tobacco abuse ICD-10-CM: Z72.0  ICD-9-CM: 305.1  9/27/2016             Jake Lamb MD, [facility]

## 2023-01-11 NOTE — PROGRESS NOTES
[facility] VISIT NOTE  Date: 2023    Name: Leesa Nieto. MRN: [id number]         : 1975      1400 Pt arrived at [facility] ambulatory and in no distress for pump disconnect. Patient c/o back pain (with movement) and nausea. Visit Vitals  /77   Pulse 80   Temp 97.7 °F (36.5 °C)   Resp 16   SpO2 99%       All chemo contents infused, pump completed at 1145. Mr. Ronna Lin tolerated treatment well and was discharged from [place] [address] in stable condition at 1406. Port de-accessed, flushed & heparinized per protocol. He is to return on  2023 at 0730 for his next appointment.     Yasmeen Chambers RN  2023    Future Appointments:  Future Appointments   Date Time Provider Dahlia Epps   2023  3:00 PM Rosy Amador MD CAV[facility] BS AMB   2023  7:30 AM SS INF2 CH2 >7H [facility]   2023  8:15 AM Amara Sherman MD ON[facility] BS AMB   2023  9:30 AM Amirah Griffiths MD PCS BS AMB   2023  1:30 PM SS INF5 CH4 <1H [facility]   2023  7:30 AM SS INF2 CH2 >7H [facility]   2023  1:30 PM SS INF6 CH4 <1H [facility]ery[facility] Servant

## 2023-01-11 NOTE — PATIENT INSTRUCTIONS
Echo for LVEF. History of MI. Next few days. ASA 81mg po daily. Lipitor 20mg po daily. See Dr. Eder Marcelo in 6 months.

## 2023-01-11 NOTE — PROGRESS NOTES
Chief Complaint   Patient presents with    New Patient     Ref by PCP. Cholesterol Problem    Hypertension    Coronary Artery Disease     Vitals:    01/11/23 1512   BP: 120/88   BP 1 Location: Left upper arm   BP Patient Position: Sitting   Pulse: 78   Height: 5' 7\" (1.702 m)   Weight: 135 lb (61.2 kg)   SpO2: 98%       Nausea.      Chest pain denied     SOB denied     Palpitations denied     Swelling in hands/feet denied     Dizziness denied     Recent hospital stays denied     Refills denied

## 2023-01-16 ENCOUNTER — TELEPHONE (OUTPATIENT)
Dept: ONCOLOGY | Age: 48
End: 2023-01-16

## 2023-01-16 RX ORDER — HYDROCORTISONE SODIUM SUCCINATE 100 MG/2ML
100 INJECTION, POWDER, FOR SOLUTION INTRAMUSCULAR; INTRAVENOUS AS NEEDED
Status: CANCELLED | OUTPATIENT
Start: 2023-01-23

## 2023-01-16 RX ORDER — DIPHENHYDRAMINE HYDROCHLORIDE 50 MG/ML
25 INJECTION, SOLUTION INTRAMUSCULAR; INTRAVENOUS AS NEEDED
Status: CANCELLED
Start: 2023-01-23

## 2023-01-16 RX ORDER — ONDANSETRON 2 MG/ML
8 INJECTION INTRAMUSCULAR; INTRAVENOUS AS NEEDED
Status: CANCELLED | OUTPATIENT
Start: 2023-01-23

## 2023-01-16 RX ORDER — ACETAMINOPHEN 325 MG/1
650 TABLET ORAL AS NEEDED
Status: CANCELLED
Start: 2023-01-23

## 2023-01-16 RX ORDER — DIPHENHYDRAMINE HYDROCHLORIDE 50 MG/ML
50 INJECTION, SOLUTION INTRAMUSCULAR; INTRAVENOUS AS NEEDED
Status: CANCELLED
Start: 2023-01-23

## 2023-01-16 RX ORDER — SODIUM CHLORIDE 9 MG/ML
5-250 INJECTION, SOLUTION INTRAVENOUS AS NEEDED
Status: CANCELLED | OUTPATIENT
Start: 2023-01-25

## 2023-01-16 RX ORDER — SODIUM CHLORIDE 9 MG/ML
5-40 INJECTION INTRAVENOUS AS NEEDED
Status: CANCELLED | OUTPATIENT
Start: 2023-01-25

## 2023-01-16 RX ORDER — ALBUTEROL SULFATE 0.83 MG/ML
2.5 SOLUTION RESPIRATORY (INHALATION) AS NEEDED
Status: CANCELLED
Start: 2023-01-23

## 2023-01-16 RX ORDER — HEPARIN 100 UNIT/ML
500 SYRINGE INTRAVENOUS AS NEEDED
Status: CANCELLED
Start: 2023-01-25

## 2023-01-16 RX ORDER — SODIUM CHLORIDE 0.9 % (FLUSH) 0.9 %
5-40 SYRINGE (ML) INJECTION AS NEEDED
Status: CANCELLED | OUTPATIENT
Start: 2023-01-25

## 2023-01-16 RX ORDER — EPINEPHRINE 1 MG/ML
0.3 INJECTION, SOLUTION, CONCENTRATE INTRAVENOUS AS NEEDED
Status: CANCELLED | OUTPATIENT
Start: 2023-01-23

## 2023-01-16 NOTE — TELEPHONE ENCOUNTER
Cancer [place] at [address], [address] [place] [id number]  W: [phone]  F: [phone]    Medical Nutrition Therapy  Nutrition Referral:    Referral received from Palliative care regarding weight loss. Called patient, no answer. Left a message, explaining that RD is available to address nutrition throughout the spectrum of care. Contact information provided.       Chemotherapy Flowsheet 1/9/2023   Cycle C1D1   Date 1/9/2023   Drug / Regimen Folfiri + Vectibix   Post Hydration -   Pre Meds given   Notes given     Wt Readings from Last 8 Encounters:   01/11/23 135 lb (61.2 kg)   01/09/23 138 lb 1.6 oz (62.6 kg)   01/09/23 138 lb (62.6 kg)   01/03/23 137 lb 12.8 oz (62.5 kg)   01/03/23 137 lb (62.1 kg)   01/03/23 137 lb 12.8 oz (62.5 kg)   12/29/22 134 lb 12.8 oz (61.1 kg)   12/20/22 139 lb 8 oz (63.3 kg)         Signed By: Gurmeet Hassan, [employer]

## 2023-01-17 ENCOUNTER — APPOINTMENT (OUTPATIENT)
Dept: INFUSION THERAPY | Age: 48
End: 2023-01-17
Payer: MEDICAID

## 2023-01-18 DIAGNOSIS — C18.4 CARCINOMA OF TRANSVERSE COLON (HCC): Primary | ICD-10-CM

## 2023-01-18 NOTE — PROGRESS NOTES
[address] [facility] On[facility] at [address] [place] [address]  [phone]    Hematology / Oncology Established Visit    Reason for Visit:   Naomi Laird is a 41 y.o. male who in follow up of colon cancer. Hematology Oncology Treatment History:     Diagnosis #1: Follicular lymphoma  Stage: IV  Pathology:   11/13/18 right inguinal LN excision: Follicular lymphoma, high-grade (grade 3a of 3). Prior Treatment:   1. Obinutuzumab-CHOP. Obinutuzumab: 1000 mg weekly on days 1, 8, 15 for cycle 1, then 1000 mg on day 1 q21 days for cycles 2-6, then monotherapy 1000 mg every 21 days for cycle 7, 8 with Cyclophosphamide 750mg/m2, Doxorubicin 50mg/m2, Vincristine 1.4mg/m2 on day 1 and Prednisone 100mg on Days 1-5, every 21 days for a total of 2 cycles completed late 1/2019. Regimen discontinued due to STEMI. 2. Obinutuzumab + Bendamustine: 1000 mg Obinutuzumab on day 1 + Bendamustine 90mg/m2 on days 1-2 on a 28-day cycle x 4 cycles, completed 5/2019  Current Treatment: Surveillance      Diagnosis #2: Colon cancer:  Stage: IV  Pathology:    2/19/22 Ascending colon; biopsy: poorly differentiated carcinoma  Comment: No dysplasia is identified. Immunohistochemical stains performed on block 1A, show the tumor positive for Cam5.2 and shows patchy positivity for CDX2 with a Ki-67 index of -90%; the tumor is focally positive for CK5/6 and GATA3; negative for p63, Pax8, CK7, CK20, panmelanoma, synaptophysin and chromogranin. The immunophenotypic features are nonspecific but argues somewhat against the following carcinoma: urothelial, neuroendocrine and breast. The immunophenotypic features also argues against melanoma and somewhat against classic colorectal carcinoma. Additional immunohistochemical stains to exclude the possibility of prostate and hepatocellular carcinoma have been   ordered; the results will be reported as an addendum.   -MLH1/MSH2/MSH6/PMS2 all intact with no loss of nuclear expression of MMR proteins - no MSI   Addendum: The addendum is issued to report the results of additional immunohistochemical stains in an attempt to ascertain the primary site of the carcinoma. The diagnosis is unchanged. Hepar and glypican 3 immunohistochemical stains are neg, arguing against hepatocellular carcinoma. PSA stain is negative, arguing against prostatic primary. Imaging studies to eval for poss primary sites maybe useful. In the absence of carcinoma/tumor at any other sites, this may represent an undifferentiated carcinoma of the colon.  -Oncogenomics (molecular) studies: POLE< ARID1A, YVONNE, ATR, BRCA2, CHECK1, FANCI, NF1, PIK3CA, PTCH1, PTEN, RAD50, RAD51, RB1, SMARCA4, STK11  -Neogenomics: KRAS exon 2 and exon 3 not detected, a VUS p. E98K is detected in Exon 4 of KRAS, PD-L1 28-8 (Opdivo) for gastric/GEJ/EAC no expression, PD-L1 (22C3 pharmDx) TPS 1% (pembrolizumab)    Prior Treatment:   1. Loop ileostomy 2/19/22  2. Diagnostic laparoscopy with peritoneal biopsy, 4/27/22  3. FOLFOXIRI every 2 weeks until disease progression or toxicity, 5/16/22 - 12/2022. Current Treatment: FOLFIRI + Panitumumab (6 mg/kg) every 14 days, to start 1/9/23     Oncologic History:  Was in his usual state of health in early November 2018 when he developed low back pain and presented to the ER. Work-up at outside hospital revealed a retroperitoneal mass seen on CT imaging, and he was transferred to [facility] for further work-up. CT there showed a large retroperitoneal mass encircling the aorta with invasion of the left renal hilum and left adrenal gland. There were bilateral inguinal lymph nodes and moderate left hydronephrosis. He was evaluated while at [facility] and was noted to have palpable nodes in his groin for approximately the past 1 month. He underwent excisional LN biopsy of right inguinal LN, which revealed follicular lymphoma. At time of diagnosis, he had no cardiac disease aside from HTN, hyperlipidemia.  However, he did have an NSTEMI after cycle 2 of O-CHOP. Was likely unrelated to chemotherapy, but opted to switch treatment to Obinutuzumab-Bendamustine. He completed treatment in 5/2019 and had a CR based on PET. History of Present Illness:   Mr. Corinna Mendez is a 41 y.o. male with is seen for follow up of colon cancer C2 of FOLFIRI + Panitumumab. He followed up with Dr. Zev Trinh and plans to have repeat echo, restarted on lipitor. [facility] does not take his insurance, referred to [facility] - has an appointment on Wednesday. Reports he woke up today with nausea, vomited twice in [facility]. Good stool output. Increased oxycodone and adding gabapentin due to ongoing abdominal pain, with minimal improvement. Reports acne on face and back. Eating and hydrating well. No fevers, chills, SOB, CP. PMHx: Lymphoma in 2018, CAD, HTN, Hyperlipidemia, Anxiety, chronic low back pain  PSurgHx: None aside from cardiac stent placement and port placement  SHx: Smokes 1/2ppd x past 20 yrs. Works part time as a cook. Has 2 children. FHx: Father had leukemia, passed away from pancreatic cancer. Review of Systems: A complete review of systems was obtained, negative except as described above. Physical Exam:     Visit Vitals  BP 94/68   Pulse 77   Temp 97.8 °F (36.6 °C)   Resp 16   Ht 5' 7\" (1.702 m)   Wt 137 lb 3.2 oz (62.2 kg)   SpO2 99%   BMI 21.49 kg/m²       ECOG PS: 0  General: Well developed, no acute distress  Eyes: anicteric sclerae  HENT: Atraumatic  Neck: Supple  Lymphatic: deferred today   Respiratory: CTAB, normal respiratory effort  CV: Normal rate, regular rhythm, no murmurs, no peripheral edema  GI: Tenderness to palpation of umbilicus and RUQ. Hard mass palpated RUQ and above the umbilicus. Colostomy in right lower quadrant with distention due to parastomal hernia  MS: Normal gait and station. Digits without clubbing or cyanosis. Skin: No rashes, ecchymoses, or petechiae.  Normal temperature, turgor, and texture. Neuro/Psych: Alert, oriented. Appropriate affect, normal judgment/insight. Results:     Lab Results   Component Value Date/Time    WBC 5.0 01/23/2023 08:14 AM    HGB 10.0 (L) 01/23/2023 08:14 AM    HCT 30.5 (L) 01/23/2023 08:14 AM    PLATELET 415 (L) 19/86/8877 08:14 AM    MCV 96.8 01/23/2023 08:14 AM    ABS. NEUTROPHILS 3.2 01/23/2023 08:14 AM    Hemoglobin (POC) 15.0 06/05/2009 02:13 PM    Hematocrit (POC) 39 02/14/2019 01:24 PM     Lab Results   Component Value Date/Time    Sodium 137 01/23/2023 11:53 AM    Potassium 3.5 01/23/2023 11:53 AM    Chloride 105 01/23/2023 11:53 AM    CO2 26 01/23/2023 11:53 AM    Glucose 104 (H) 01/23/2023 11:53 AM    BUN 10 01/23/2023 11:53 AM    Creatinine 0.76 01/23/2023 11:53 AM    GFR est AA >60 10/03/2022 07:50 AM    GFR est non-AA >60 10/03/2022 07:50 AM    Calcium 8.8 01/23/2023 11:53 AM    Sodium (POC) 136 02/14/2019 01:24 PM    Potassium (POC) 3.9 02/14/2019 01:24 PM    Chloride (POC) 102 02/14/2019 01:24 PM    Glucose (POC) 249 (H) 02/15/2019 10:21 PM    BUN (POC) 14 02/14/2019 01:24 PM    Creatinine (POC) 0.9 02/14/2019 01:24 PM    Calcium, ionized (POC) 1.24 02/14/2019 01:24 PM     Lab Results   Component Value Date/Time    Bilirubin, total 0.7 01/23/2023 11:53 AM    ALT (SGPT) 15 01/23/2023 11:53 AM    Alk.  phosphatase 251 (H) 01/23/2023 11:53 AM    Protein, total 6.1 (L) 01/23/2023 11:53 AM    Albumin 2.9 (L) 01/23/2023 11:53 AM    Globulin 3.2 01/23/2023 11:53 AM     Lab Results   Component Value Date/Time    Iron 18 (L) 05/06/2022 11:13 AM    TIBC 173 (L) 05/06/2022 11:13 AM    Iron % saturation 10 (L) 05/06/2022 11:13 AM    Ferritin 426 (H) 05/06/2022 11:13 AM       No results found for: B12LT, FOL, RBCF  Lab Results   Component Value Date/Time    TSH 1.53 09/28/2016 04:40 AM     Lab Results   Component Value Date/Time    CEA 43.2 01/09/2023 08:09 AM         11/11/18:       Labs at [facility]:  2/26/22: CA 19-9 = 390  2/26/22: CEA = 12.3  5/16/22: CEA = 19.2  22: CEA = 17.9   22: CEA = 6  22: CEA = 6.8  22: CEA = 10.8  10/3/22: CEA 10.1  22: CEA = 21  22: CEA = 31  23:     CEA = 43 (Started FOLFIRI + Panitumumab)    Signatera MRD:  22: 295.97 MTM/mL  22: 2.98  22: 0.88   10/24/22: 37.87  22: 159.91  (Started FOLFIRI + Panitumumab)    Imagin/9/18 Abd/pelvis CT: IMPRESSION:  1. Interval development of a large retroperitoneal mass encircling the aorta with invasion of the left renal hilum and left adrenal gland. Several adjacent lymph nodes are seen extending into the peritoneum and underneath the  diaphragmatic natalie. This most likely represents lymphoma. 2. Several new bilateral enlarged inguinal lymph nodes also likely representing lymphoma. 3. Moderate left hydronephrosis with a delayed renal nephrogram related to decreased renal function. This is related to the invasion of the renal hilum. 18 Chest CT: IMPRESSION:  Trace left pleural effusion. Bilateral lower lobe atelectasis. Large  retroperitoneal mass lesion again demonstrated. PET 18:  FINDINGS:  HEAD/NECK: Right palatine tonsil intense hypermetabolism, max SUV 18. Multilevel  bilateral cervical adenopathy, with max SUV 12 in a left supraclavicular node. Cerebral evaluation is limited by normal intense activity. CHEST: Solitary hypermetabolic left axillary node, max SUV 11. ABDOMEN/PELVIS: Bulky retroperitoneal mass max SUV 27, with several additional  small active abdomino-pelvic nodes. Bilateral inguinal nodes with max SUV 12 on  the left. SKELETON: No foci of abnormal hypermetabolism in the axial and visualized  appendicular skeleton. IMPRESSION:   1. Right palatine tonsil tumor involvement (Deauville 5). 2. Bilateral cervical delaney involvement (Deauville 5). 3. Left axillary node involvement (Deauville 5).   4. Bulky retroperitoneal lymphoma mass and additional smaller hypermetabolic  abdomino-pelvic nodes (Deauville 5).  5. Bilateral inguinal delaney involvement (Deauville 5). Deauville Five Point Scale  1. No uptake or no residual uptake (when used interim)  2. Slight uptake, but below blood pool (mediastinum)  3. Uptake above mediastinal, but below/equal to uptake in the liver  4. Uptake slightly to moderately higher than liver  5. Markedly increased uptake or any new lesion (on response evaluation)  Each FDG-avid (or previously FDG avid) lesion is rated independently. Reference values:  Mediastinal blood pool: 2.1 SUV  Liver (background): 2.2 SUV    PET/CT 2/05/19:   IMPRESSION:  1. No Foci of Abnormal Hypermetabolism (Deauville 1). 2. Resolved activity in the right palatine tonsil, bilateral cervical nodes,left axillary node, retroperitoneal/abdominal pelvic adenopathy, bilateral inguinal nodes. Echo 2/14/19:  Normal cavity size, wall thickness and systolic function (ejection fraction normal). The muscle mass is normal. The cavity shape is normal. The estimated ejection fraction is 41 - 45%. Abnormal wall motion as described on the wall scoring diagram below. End-systolic volume is normal. Normal left ventricular strain. There is mild (grade 1) left ventricular diastolic dysfunction. Normal left ventricular diastolic pressure. End-diastolic volume is normal.    LE arterial duplex 2/22/19:  There is evidence of left groin pseudoaneurysm noted arising from distal common femoral artery, pseudo lobe measures 2.32cm x 2.58cm and pseudo neck length measuring 0.63cm. There is no evidence of hemodynamically significant left lower extremity arterial obstruction. JACLYN is 1.03 on the right and 1.02 on the left. LE arterial duplex s/p Thrombin Injection to Pseudoaneurysm 2/26/19:  Successful thrombin injection procedure of the left groin with no further flow seen. No evidence of hemodnyamically significant obstruction in the left lower extremity. Left lower extremity arterial duplex performed.   Confirmed pseudoaneurysm in left groin with small neck. Following thrombin injection, no further flow seen in the pseudoaneurysm. The left common femoral, profunda femoral, femoral, popliteal, posterior tibial and anterior arteries were imaged. Mainly triphasic flow was seen with no evidence of significantly elevated velocities. Repeat LE arterial duplex 2/27/19:  Continued thrombosed left groin pseudoaneurysm following thrombin injection on 02/26/2019. No flow or color fill is identified. The hematoma measures approximately 2.1 x 2.9 cm in diameter. The common femoral, deep femoral, femoral, and popliteal arteries are patent with mainly tri-phasic flow and no significant hemodynamically obstruction is noted. Stress 5/31/19:  Normal stress myocardial perfusion without ischemia or infact at 84% MPHR. Normal LV function. LVEF 60%. No EKG changes of ischemia at peak exercise. Normal functional capacity. PET 6/03/19: IMPRESSION: No Foci of Abnormal Hypermetabolism (Deauville 1). -MRI lumbar spine 12/18/19:  Mild disc degenerative change at L3-4 and L4-5. Mild canal stenosis at L3-4 and mild left foraminal stenosis at L4-5. Other less severe degenerative findings are as described above. Continued diminished size of retroperitoneal mass-adenopathy,  with diminished soft tissue density at the left renal hilum. -CT chest/abdomen 12/16/19:  Findings are consistent with interval response to therapy    CT c/a/p 5/28/20: IMPRESSION:  Further contraction of the retroperitoneal mantle and chris mesentery,  compatible with treatment response  Stable left hilar soft tissue mass  Slight increased splaying of the duodenum and proximal jejunum is the result, without obstruction  No evidence for recurrence of lymphoma in the chest, abdomen, or pelvis    CT c/a/p 2/13/22:  FINDINGS:   LOWER THORAX: Dependent atelectasis with otherwise clear lungs. The visualized  heart is normal in size without pericardial effusion.   LIVER: No mass.  BILIARY TREE: Gallbladder is unremarkable. CBD is not dilated. SPLEEN: Unremarkable. PANCREAS: No mass or ductal dilatation. ADRENALS: Unremarkable. KIDNEYS: Atrophic left kidney with mild left hydronephrosis. Right kidney is  unremarkable with no stone, enhancing mass, or other renal abnormality. STOMACH: Unremarkable. SMALL BOWEL: No dilatation or wall thickening. COLON: Circumferential wall thickening at the hepatic flexure with luminal  narrowing, adjacent mesenteric stranding, and fluid with upstream retention in  the cecum and fecalization of distal ileal contents. No dilation or other wall  thickening. APPENDIX: Unremarkable. PERITONEUM: Moderate free fluid with no pneumoperitoneum. RETROPERITONEUM: Soft tissue stranding around the celiac and SMA and associated aorta with no discrete mass or adenopathy. Aorta is normal in size without aneurysm or dissection. REPRODUCTIVE ORGANS: Prostate and seminal vesicles appear unremarkable. URINARY BLADDER: No mass or calculus. BONES: No destructive bone lesion. ABDOMINAL WALL: No mass or hernia. ADDITIONAL COMMENTS: N/A  IMPRESSION  1. Annular mass lesion of the right colon with upstream fecal retention  concerning for primary colon neoplasm versus less likely lymphoma. 2. Soft tissue stranding around celiac axis, SMA, and abdominal aorta may  reflect infiltrative lymphoma. 3. Left renal atrophy with left hydronephrosis likely reflecting chronic  proximal ureteral obstruction. 4. Incidentals as above. 2/24/22 CT abd/pelvis at [facility]:  FINDINGS: An enteric tube with sidehole beyond the level of the lower esophageal sphincter is seen looping on itself in the proximal stomach before terminating in the mid stomach. Status post right lower quadrant diverting ileostomy for an obstructing colonic mass. Multiple loops of gas dilated small bowel remain, with a maximal diameter of 5.4 cm whereas previously measured 3.6 cm.  Dilute contrast is seen within the lateral left abdominal dilated small bowel. Moderate stool burden and bowel gas opacifies the cecum, measuring 7.3 cm in diameter. No definite supine evidence of pneumoperitoneum. Lung bases: Limited evaluation of the lung bases demonstrates a cardiac silhouette within normal limits for size. A small bore central venous catheter is seen terminating in the right atrium. No focal consolidation or pleural effusion. 2/24/22 CT abd/pelvis [facility]:  IMPRESSION:  1. Status post right lower quadrant loop ileostomy. Mild diffuse dilation of small bowel proximal to the ostomy in the lower abdomen and upper pelvis concerning for at least mild to moderate partial bowel obstruction. Relatively decompressed loop ileostomy. 2. Mildly complex pelvic fluid collection in the rectovesical space, decreased in size from prior. 3. Redemonstrated ascending colon mass and findings suspicious for regional metastatic disease. 4. Redemonstrated soft tissue in the retroperitoneum with prominent lymph nodes, similar to prior examination. Differential would include metastasis, retroperitoneal fibrosis. 5. Mild atherosclerosis. 6. Subcentimeter right renal hypodensity, too small to characterize. 7. Severe compression of the left renal vein, similar to prior examination. 8. Additional findings as described above. Bones: No acute osseous abnormality. 2/24/22 CT chest [facility]:  IMPRESSION:  FINAL report. 1. No evidence of metastatic disease to the chest.  2. No enlarged lymph nodes. 3. Increasing volume loss in the lung bases which may be due to atelectasis. 4. CT abdomen and pelvis reported separately. 2/25/22 Colonoscopy at [facility]:  Impression:            - Preparation of the colon was inadequate. - Stool in the entire examined colon. - Likely malignant completely obstructing tumor at the                         hepatic flexure. Biopsied.                         - Malignant-appearing tumor in the colon. Complications:         No immediate complications. 7/19/22 CT ch/abd/pelv:  IMPRESSION  Response to treatment characterized by decrease in size of the apical core  lesion in the proximal transverse colon and decreased mucosal colic  lymphadenopathy. Persistent mesenteric lymphadenopathy and retroperitoneal/mesenteric soft tissue  which may relate to the patient's history of lymphoma. Chronic left renal atrophy with chronic left hydronephrosis. RECIST:  Target lesions:  Transverse colon mass, series 2, image 75, 27 x 26 mm, previously 49 x 45 mm. Nontarget lesions:  Transverse mesocolic lymph nodes, decreased. 10/10/22 CT ch/abd/pelv:  IMPRESSION  Increase in peritoneal disease compared to the prior examination. Stable  mesenteric infiltrate. Improvement in the mass lesion involving the transverse  colon. RECIST:  Target lesion:  Transverse colon mass, series 2, image 76, 1.7 x 1.3 cm, previously 2.7 x 2.6cm. Nontarget lesions:   Transverse mesial colic lymph nodes unchanged. 12/30/22 CT ch/abd/pelvis:  IMPRESSION  Progression of disease with increase in size of perihepatic  infiltrative disease, increased in size and number of abdominal metastases, and  new soft tissue metastases in the anterior abdominal wall. Soft tissue  infiltrating around the celiac axis, SMA, and renal arteries and veins is not  significant changed. Incidentals as above with no CT evidence of metastatic  disease to the thorax. Assessment & Plan:   Emma Cohn is a 41 y.o. male comes in for evaluation and management of lymphoma. 1. Undifferentiated carcinoma of transverse colon, metastatic, KRAS/NRAS wild type but status unclear:  SUZANNE, PDL1 1%  S/p diverting ileostomy due to large bowel obstruction. Colonoscopy with biopsy on 2/25/22. No obvious metastatic disease on CT imaging, but did have obvious metastatic disease to peritoneum during laparoscopy with Dr. Megan Renner.  I recommended FOLFOXIRI every 2 weeks. Will not give Bevacizumab given h/o MI and tobacco use. ClarRoberlect suggests: BRCA2 and YVONNE mutations support use of Olaparib or similar PARP inhibitor in future. They also suggest testing for TMB, PDL1 to determine utility of checkpoint inhibitors. CT after C9 showed good response with decrease in size of colon mass, but increased peritoneal implants on 10/10/22. Future treatment options include Keytruda/Nivolumab, Olaparib. (KRAS wild type, TMB high suggested on [facility] pathology specimen)  Supportive medications: zofran, compazine, dexamethasone, EMLA topical  -- Referred to [facility] for consideration of short course palliative RT to painful abdominal metastasis. -- Proceed with C2 FOLFIRI + Panitumumab. -- Follow up NRAS/BRAF from peritoneal biopsy specimen 4/2022.   -- Check CEA every 8 weeks  -- Return in 2 weeks for C3 FOLFIRI + Ricci, MD/NP visit. 2. H/o Follicular lymphoma:   Grade 3a with bulky disease encircling the aorta, causing pain. Bone marrow negative. BR better than RCHOP, but based on GALLIUM study, Obinutuzumab-based induction and maintenance prolongs PFS over that seen with rituximab-based therapy. Therefore, pt started on O-CHOP regimen. Pt achieved CR after C2, but was switched to BR x 4 cycles given STEMI. Completed treatment 5/2019. End of treatment PET 6/3/19 showed CR. No extra surveillance needed at this time given metastatic colon cancer with frequent imaging. Current imaging shows slight increase in soft tissue density in deep pelvis on 10/2022. Will continue to monitor for colon cancer follow up imaging. 3. Chronic lumbar back pain / anxiety / RLQ / acute right upper quadrant and umbilical pain:   Left lower back pain is chronic/stable. RLQ is likely related to neoplasm/colostomy/parastomal hernia. Evaluated by Dr. Bree Christian who believes umbilical burning pain is due to tumor implants.  Pain moderately managed on Oxycontin 20mg q12h, oxycodone 20mg q4h prn, gabapentin. Following with Dr. Too Stovall. -- [facility] discuss palliative radiation to new tumor implants    4. CAD / HTN:   h/o STEMI 2/14/19 with TOM placed to proximal LAD. Following with Dr. Yahir Eng and restarted on ASA, Lipitor. Received only 2 doses of cardiotoxic chemo, Doxorubicin in early 1/2019.   -- Refilled ASA 81mg/d as pt has not started. -- Repeat echo with Dr. Yahir Eng.   -- Follow up with Dr. Yahir Eng 7/2023.     5. Tobacco abuse:   Previously discussed smoking cessation strategies and benefits. Pt has tried quitting in the past and is not interested. 6. BRCA2 mutation:  Pt would benefit from genetic counseling for himself and his family. 7. Chemotherapy induced neutropenia / thrombocytopenia:  Intermittent. Monitor. 8. Acneiform rash:   Present on upper trunk, face. Due to Panitumumab. -- Advised thin layer clindamycin BID. Rx sent. 9. Hypotension / nausea:   Acute nausea this morning improved with aloxi. Discussed antiemetics after treatment and to reach out if not improving.  -- 1 L NS today. Goals of care: Disease is not curable, but is treatable to improve quality and duration of life. I personally saw and evaluated the patient and performed the key components of medical decision making. The history, physical exam, and documentation were performed by Herminia Goncalves NP. I reviewed and verified the above documentation and modified it as needed.  Proceed with   Signed By: Luis Graves MD

## 2023-01-19 ENCOUNTER — APPOINTMENT (OUTPATIENT)
Dept: INFUSION THERAPY | Age: 48
End: 2023-01-19

## 2023-01-19 ENCOUNTER — TELEPHONE (OUTPATIENT)
Dept: ONCOLOGY | Age: 48
End: 2023-01-19

## 2023-01-19 NOTE — TELEPHONE ENCOUNTER
Spoke with [facility]ology to schedule patients appointment    January 25th @ 1pm  [address], [address]  [phone]    Called patient to inform him of the information above. No answer. Left detailed voicemail.

## 2023-01-20 ENCOUNTER — TELEPHONE (OUTPATIENT)
Dept: ONCOLOGY | Age: 48
End: 2023-01-20

## 2023-01-20 DIAGNOSIS — C18.9 COLON ADENOCARCINOMA (HCC): ICD-10-CM

## 2023-01-20 DIAGNOSIS — R10.84 ABDOMINAL PAIN, GENERALIZED: ICD-10-CM

## 2023-01-20 NOTE — TELEPHONE ENCOUNTER
Triage for Controlled Substance Refill Request     Pain Diagnosis: _Colon adenocarcinoma ([facility] [address] (C18.9); Abdominal pain, generalized (R10.84); Peritoneal carcinomatosis ([facility] [address] (C78.6)     Last Outpatient Visit: _1/9/2023     Next Outpatient Visit: _1/23/2023     Reason for refill needed outside of office visit? Appointment not scheduled prior to need for scheduled refill        Pharmacy: _[facility]/pharmacy #[id number]      Medication:oxyCODONE IR (ROXICODONE) 10 mg tab immediate release tablet     Dose and directions: Take 1 Tablet by mouth every four (4) hours as needed for Pain for up to 15 days.   Number dispensed:90  Date filled ( or Pharmacy) 1/10/2023  # left: 50         reviewed: _yes 1/20/2023     Date of Urine Drug Screen:  _8/22/2022     Opioid Safety Handout given:  _yes     Appropriate for refill:  _yes     Action:  _ Medication pending

## 2023-01-20 NOTE — TELEPHONE ENCOUNTER
[facility] at [address], [address] [facility] [address] [address]  W: [phone]  F: [phone]    Medical Nutrition Therapy  Nutrition Referral:    Called patient again. Spoke with wife. He is currently sleeping. She reports his appetite has improved over the past week. Previously he was eating poor, but has gotten better. She will encourage him to call back. Let her know that RD is available for any nutrition related questions or concerns. Contact information provided.        Chemotherapy Flowsheet 1/9/2023   Cycle C1D1   Date 1/9/2023   Drug / Regimen Folfiri + Vectibix   Post Hydration -   Pre Meds given   Notes given     Wt Readings from Last 8 Encounters:   01/11/23 135 lb (61.2 kg)   01/09/23 138 lb 1.6 oz (62.6 kg)   01/09/23 138 lb (62.6 kg)   01/03/23 137 lb 12.8 oz (62.5 kg)   01/03/23 137 lb (62.1 kg)   01/03/23 137 lb 12.8 oz (62.5 kg)   12/29/22 134 lb 12.8 oz (61.1 kg)   12/20/22 139 lb 8 oz (63.3 kg)         Signed By: Damien Astudillo, 105 nGAP

## 2023-01-20 NOTE — TELEPHONE ENCOUNTER
Received call from patient requesting Oxycodone 10 mg refill. Patient has 48 pills remaining. To be called in to [facility] pharmacy.

## 2023-01-21 RX ORDER — OXYCODONE HYDROCHLORIDE 10 MG/1
20 TABLET ORAL
Qty: 120 TABLET | Refills: 0 | Status: SHIPPED | OUTPATIENT
Start: 2023-01-21 | End: 2023-01-31

## 2023-01-21 NOTE — PROGRESS NOTES
Palliative Medicine Outpatient Services  [place]: [phone] ([phone])    Patient Name: Emely Gordillo YOB: 1975     Date of Current Visit: 01/23/23   Location of Current Visit:    [] [facility] Office  [x] [facility] Office  [] [address] [place] [address]  [] Home  [] Synchronous real-time A/V visit    Date of Initial Visit: 2/19/19   Referral from: Lowell Grover MD  Primary Care Physician: Marlene Tapia MD      SUMMARY:   Emely Gordillo is a 41y.o. year old with high-grade follicular lymphoma, who was referred to Palliative Medicine by Dr. Thong Henson for symptom management and supportive care. He was diagnosed in 11/2018 after presenting with back pain, treated with systemic chemotherapy with complete response. He suffered cardiac arrest during cycle #3 due to previously undiagnosed severe stenosis of the LAD s/p PTCA and stent. He was diagnosed with poorly differentiated carcinoma of the colon (no evidence of metastatic disease) in 2/14/22 and underwent loop ileostomy at [facility] on 2/19/22. He underwent exploratory laparoscopy in 4/2022 which revealed a large tumor at the hepatic flexure peritoneal carcinomatosis. Scans 12/2022 revealed disease progression. He is currently being treated with systemic chemotherapy under the care of Dr. Jarocho Humphrey with the goal of palliation of symptoms and prolongation of life. The patients social history includes: he is single. He lives in [place] with his girlfriend, Christo Horn, and their young old son. He has 3 teenage children who live with their mother and with whom he has close contact. 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 Palliative Medicine is addressing the following current patient/family concerns: abdominal pain related to malignancy; anxiety, depression; left mid- and low back pain, poor appetite, fatigue, advanced care planning.     Initial Referral Intake note from Darrel Oneil RN reviewed prior to visit   PALLIATIVE DIAGNOSES:       ICD-10-CM ICD-9-CM    1. Abdominal pain, generalized  R10.84 789.07 oxyCODONE ER (OxyCONTIN) 20 mg ER tablet      2. Chronic left-sided low back pain without sciatica  M54.50 724.2 oxyCODONE ER (OxyCONTIN) 20 mg ER tablet    G89.29 338.29       3. Neuropathic pain  M79.2 729.2       4. Anxiety  F41.9 300.00       5. Reactive depression  F32.9 300.4       6. Poor appetite  R63.0 783.0       7. Nausea without vomiting  R11.0 787.02       8. Other fatigue  R53.83 780.79       9. Colon adenocarcinoma (HCC)  C18.9 153.9 oxyCODONE ER (OxyCONTIN) 20 mg ER tablet      10. Peritoneal carcinomatosis (HCC)  C78.6 197.6 oxyCODONE ER (OxyCONTIN) 20 mg ER tablet           PLAN:     Patient Instructions   Dear Carly Holbrook. ,    It was a pleasure seeing you today for an office visit. We will see you again in 2 weeks for an office visit to coordinate with your infusion. If labs or imaging tests have been ordered for you today, please call the office  at [phone] 48 hours after completion to obtain the results. Your described symptoms were: Fatigue: 8 Drowsiness: 9   Depression: 4 Pain: 10   Anxiety: 10 Nausea: 7   Anorexia: 6 Dyspnea: 0   Best Well-Bein Constipation: No   Other Problem (Comment): 0       This is the plan we talked about:    1. Abdominal pain/neuropathic pain  -You continue to have 6/10 abdominal pain when sitting  -You also continue to experience 10/10 burning shakira-umbilical pain when you stand  -Today Dr. Lisa Saravia referred you to Radiation Therapy at [facility] and you have an appointment on Wednesday  -Continue oxycodone 10-mg: take 2 tabs every 4 hours as needed  -Continue extended-release oxycodone to 20-mg every 12 hours  -Continue gabapentin 300-mg: take 1 cap at bedtime   -Today we discussed increasing extended-release oxycodone to every 8 hours and you wish to continue with your current oxycodone doses with the hope radiation therapy may provide relief     2.  Low back pain  -This has been chronic since lymphoma diagnosis several years ago  -Continue oxycodone as above    3. Depression/anxiety  -You have chronic anxiety which has increased with the news of your cancer progression and your increased pain  -You've met with our clinical , Sebastián De Luna, in the past  -Continue Lexapro 20-mg daily  -I'm avoiding benzodiazepines due to synergistic effect with opioids    4. Poor appetite/weight loss  -Your appetite is poor and you've lost 10 pounds recently  -Nae Flaherty, the [facility], reached out to you recently    6. Fatigue  -This is chronic and unchanged   -You're sleeping about 5 hours at night, except on nights when you take dexamethasone    7. Nausea  -Continue ondansetron 8-mg every 8 hours as needed  -Continue prochlorperazine 10-mg every 6 hours as needed    8. Colon mass  -You're receiving chemotherapy under the care of Dr. Nirmal Arthur      This is what you have shared with us about Advance Care Planning:      Primary Decision Maker: Dian Ureñafriend - [phone]  Advance Care Planning 1/9/2023   Patient's [address] is: Legal Next of Kin   Confirm Advance Directive None   Patient Would Like to Complete Advance Directive -   Does the patient have other document types -           The Palliative Medicine Team is here to support you and your family.        Sincerely,      Tano Marr MD and the Palliative Medicine [facility]     GOALS OF CARE / TREATMENT PREFERENCES:   [====Goals of Care====]  GOALS OF CARE:  Patient / health care proxy stated goals: See Patient Instructions / Summary    TREATMENT PREFERENCES:   Code Status:  [x] Attempt Resuscitation       [] Do Not Attempt Resuscitation    Advance Care Planning:  [x] The [facility] Interdisciplinary Team has updated the ACP Navigator with Decision Maker and Patient Capacity      Primary Decision Maker: Dian Ureñafriend - [phone]    [] Named in a scanned document [x] Legal Next of Kin  [] Guardian    Other:  (If patient appropriate for POST, consider using PALLPOST smart phrase here)    The palliative care team has discussed with patient / health care proxy about goals of care / treatment preferences for patient.  [====Goals of Care====]     PRESCRIPTIONS GIVEN:     No orders of the defined types were placed in this encounter. FOLLOW UP:     Future Appointments   Date Time Provider Dahlia Epps   1/23/2023  7:30 AM SS INF2 CH2 >7H [facility]   1/23/2023  8:15 AM Stacey Carter MD ONCSF BS AMB   1/23/2023  9:30 AM Ansley Camacho MD PCS BS AMB   1/25/2023  1:30 PM SS INF5 CH4 <1H [facility]   2/2/2023  8:00 AM OLGA MARX CAVSF BS AMB   2/6/2023  7:30 AM SS INF2 CH2 >7H [facility]   2/8/2023  1:30 PM SS INF6 CH4 <1H [facility] [place]   7/12/2023  3:00 PM [facility]           PHYSICIANS INVOLVED IN CARE:   Patient Care Team:  Ren Cagle MD as PCP - General (Family Medicine)  Seferino Porras MD (Hematology and Oncology)  nAsley Camacho MD as Physician (Palliative Medicine)  Ansley Camacho MD as Physician (Palliative Medicine)       HISTORY:   Reviewed patient-completed ESAS and advance care planning form. Reviewed patient record in prescription monitoring program.    CHIEF COMPLAINT:   No chief complaint on file. HPI/SUBJECTIVE:    The patient is: [x] Verbal / [] Nonverbal     He increased IR oxycodone 10-mg from 1 to 2 tabs every 4 hours as needed. He's been taking the increased dose ~4 times daily and continues to take ER oxycodone 10-mg twice daily. He's had no improvement in his pain. He hasn't started gabapentin yet. He continues to feel nauseous every day, usually in the evenings, Zofran helps. He's eating. See Plan/Patient Instructions for additional interval history      From visit 3/2/22:  He started having abdominal pain about a month ago.   Then he started feeling nauseous. He had trouble with bowel movements, feeling like he wasn't completing emptying his bowels. He went to the ED on 2/14, CT scan showed mass in his colon. He was hospitalized at [facility] 2/15-2/25 for colonoscopy and loop ileostomy. He's still having pain in his abdomen. He's been taking 2 oxycodone every 4 to 6 hours which eases the pain to ~5/10 which is tolerable. He's always anxious. He continues to take Lexapro. He's eating, not as much as he used to. He ate 2 PBJs yesterday. He had mild nausea for a few days after he came home. He's passing formed stool in his ostomy bag daily. Home health is coming to his home. He sees Dr. Lucia Fonseca next week. From IV 2/19/19:  He has a lot of anxiety. He's lived with anxiety for a long time, sometimes it's been worse than others, like when he lost his job and lost his insurance. He took Wellbutrin then which made him more anxious and depressed. He's had more anxiety since he was diagnosed with lymphoma. He's been taking lorazepam and it doesn't help at all, even when he tried taking 2 pills. This is his biggest problem now. He feels depressed but it's not as bad as the anxiety. He has pain in his back, that's what brought him to the hospital in November. He's been taking oxycodone which helps some. The groin pain started after his operation (cardiac cath) in the hospital last week. He expects that will get better as it heals. His pain doesn't bother him too much, not like the anxiety. His appetite is getting better. He's lost ~30# since last fall but that's leveled off now. He's moving his bowels regularly. He sometimes gets short of breath but not as much as used to. He doesn't have chest pain, never did. He sleeps well at night. He doesn't have the energy to do much during the day. He lives with his father. He sees his three teen-age children frequently (they live with their mother).  His children give him a lot of strength. Clinical Pain Assessment (nonverbal scale for nonverbal patients):   [++++ Clinical Pain Assessment++++]  [++++Pain Severity++++]:    [++++Pain Character++++]: stabbing pain in back  [++++Pain Duration++++]: months for back pain, weeks for groin pain  [++++Pain Effect++++]: little  [++++Pain Factors++++]: oxycodone helps with back pain, groin pain elicited by standing and walking  [++++Pain Frequency++++]: constant back pain with varying intensity  [++++Pain Location++++]: left lower back  [++++ Clinical Pain Assessment++++]    [++++ Clinical Pain Assessment++++]  [++++Pain Severity++++]:    [++++Pain Character++++]: deep, aching  [++++Pain Duration++++]: since 2/2022  [++++Pain Effect++++]: limits function, emotional distress  [++++Pain Factors++++]: unable to identify provoking factors  [++++Pain Frequency++++]: constant  [++++Pain Location++++]: right lower abdomen  [++++ Clinical Pain Assessment++++]    [++++ Clinical Pain Assessment++++]  [++++Pain Severity++++]: 0 with sitting, 10/10 with standing  [++++Pain Character++++]: burning  [++++Pain Duration++++]: months  [++++Pain Effect++++]: limits ambulation/function  [++++Pain Factors++++]:   [++++Pain Frequency++++]: intermittent depending upon body position  [++++Pain Location++++]: shakira-umbilical  [++++ Clinical Pain Assessment++++]      FUNCTIONAL ASSESSMENT:     Palliative Performance Scale (PPS):          PSYCHOSOCIAL/SPIRITUAL SCREENING:     Any spiritual / Christian concerns:  [] Yes /  [x] No    Caregiver Burnout:  [] Yes /  [x] No /  [] No Caregiver Present      Anticipatory grief assessment:   [x] Normal  / [] Maladaptive       ESAS Anxiety:      ESAS Depression:         REVIEW OF SYSTEMS:     The following systems were [x] reviewed / [] unable to be reviewed  Systems: constitutional, ears/nose/mouth/throat, respiratory, gastrointestinal, genitourinary, musculoskeletal, integumentary, neurologic, psychiatric, endocrine. Positive findings noted below. Modified ESAS Completed by: provider                                            PHYSICAL EXAM:     Wt Readings from Last 3 Encounters:   01/11/23 135 lb (61.2 kg)   01/09/23 138 lb 1.6 oz (62.6 kg)   01/09/23 138 lb (62.6 kg)   8# weight loss since 11/28/22      There were no vitals taken for this visit.   Last bowel movement: See Nursing Note    Constitutional: sitting in recliner, appears more rested than last week  Eyes: pupils equal, anicteric  ENMT: no nasal discharge, moist mucous membranes  Cardiovascular: regular rhythm, no peripheral edema  Respiratory: breathing not labored, symmetric  Gastrointestinal: +bowel sounds; firm masses easily palpable; tenderness with palpation shakira-umbilical area; loose brown stool in ostomy bag  Musculoskeletal: no deformity, no tenderness to palpation  Skin: warm, dry; pale  Neurologic: following commands, moving all extremities  Psychiatric: full affect, no hallucinations  Other:            HISTORY:     Past Medical History:   Diagnosis Date    Anxiety     Anxiety and depression     Cancer ([facility] [address]     lymphoma Nov 2018 receiving chemo    Cancer ([facility] [address] 02/2022    COLON    Chronic pain     lower back- lymphoma    Hyperlipidemia     Hypertension     NO MEDICATION FOR PAST 9 MONTHS    Lymphadenopathy 11/12/2018    Seizures ([facility] [address] CHILDHOOD    NEVER TOOK MEDICATION - \"GREW OUT OF THEM\"      Past Surgical History:   Procedure Laterality Date    HX COLONOSCOPY      HX HEART CATHETERIZATION  02/2019    HX ILEOSTOMY      HX OTHER SURGICAL  02/2019    cardiac stent    IR INJECTION PSEUDOANEURYSM  02/26/2019    MO LAPS ABD PRTM&OMENTUM DX W/WO SPEC BR/WA SPX  04/27/2022    Peritoneal biopsy Dr. Berry Handing  02/2022    COLON RESECTION WITH ILEOSTOMY    MO UNLISTED PROCEDURE CARDIAC SURGERY  2019    STENT X1      Family History   Problem Relation Age of Onset    Hypertension Father     Diabetes Father     Cancer Father         PROSTATE    Diabetes Mother     No Known Problems Brother     No Known Problems Brother     Anesth Problems Neg Hx       History reviewed, no pertinent family history. Social History     Tobacco Use    Smoking status: Every Day     Packs/day: 0.50     Years: 20.00     Pack years: 10.00     Types: Cigarettes    Smokeless tobacco: Never    Tobacco comments:     \"STOP SMOKING\" PACKET GIVEN TO PATIENT   Substance Use Topics    Alcohol use: No     No Known Allergies   Current Outpatient Medications   Medication Sig    oxyCODONE IR (ROXICODONE) 10 mg tab immediate release tablet Take 2 Tablets by mouth every four (4) hours as needed for Pain for up to 10 days. Max Daily Amount: 120 mg.    atorvastatin (LIPITOR) 20 mg tablet Take 1 Tablet by mouth daily. gabapentin (NEURONTIN) 300 mg capsule Take 1 Capsule by mouth nightly. Max Daily Amount: 300 mg. Indications: neuropathic pain    oxyCODONE ER (OxyCONTIN) 20 mg ER tablet Take 1 Tablet by mouth every twelve (12) hours for 30 days. Max Daily Amount: 40 mg.    lidocaine (LIDODERM) 5 % 1 Patch by TransDERmal route every twenty-four (24) hours. Apply patch to the abdomen for 12 hours a day and remove for 12 hours a day. potassium chloride (KLOR-CON M10) 10 mEq tablet Take 1 Tablet by mouth daily. ondansetron hcl (ZOFRAN) 8 mg tablet Take 1 Tablet by mouth every eight (8) hours as needed for Nausea or Vomiting. prochlorperazine (Compazine) 10 mg tablet Take 1 Tablet by mouth every six (6) hours as needed for Nausea or Vomiting.    lidocaine-prilocaine (EMLA) topical cream Apply a dime size amount to port site 30 minutes before treatment to prevent pain. dexAMETHasone (DECADRON) 4 mg tablet Take 8mg (2 x tabs) on days 2 and 3 after treatment to prevent nausea. Take in the AM with food. multivitamin (ONE A DAY) tablet Take 1 Tablet by mouth daily. No current facility-administered medications for this visit.           LAB DATA REVIEWED:     Lab Results   Component Value Date/Time    WBC 6.6 01/09/2023 08:09 AM    HGB 10.0 (L) 01/09/2023 08:09 AM    PLATELET 923 (L) 69/17/2129 08:09 AM     Lab Results   Component Value Date/Time    Sodium 137 01/09/2023 08:09 AM    Potassium 3.5 01/09/2023 08:09 AM    Chloride 106 01/09/2023 08:09 AM    CO2 27 01/09/2023 08:09 AM    BUN 7 01/09/2023 08:09 AM    Creatinine 0.85 01/09/2023 08:09 AM    Calcium 9.4 01/09/2023 08:09 AM    Magnesium 1.7 01/12/2019 04:05 AM    Phosphorus 2.0 (L) 01/12/2019 04:05 AM      Lab Results   Component Value Date/Time    Alk. phosphatase 380 (H) 01/09/2023 08:09 AM    Protein, total 6.6 01/09/2023 08:09 AM    Albumin 3.0 (L) 01/09/2023 08:09 AM    Globulin 3.6 01/09/2023 08:09 AM     Lab Results   Component Value Date/Time    INR 1.1 02/22/2019 08:18 PM    Prothrombin time 10.8 02/22/2019 08:18 PM    aPTT 27.8 02/22/2019 08:18 PM      Lab Results   Component Value Date/Time    Iron 18 (L) 05/06/2022 11:13 AM    TIBC 173 (L) 05/06/2022 11:13 AM    Iron % saturation 10 (L) 05/06/2022 11:13 AM    Ferritin 426 (H) 05/06/2022 11:13 AM          MRI lumbar spine 12/18/19:  Congenital narrowing of the lumbar canal. Vertebral body heights are maintained. Marrow signal is normal.     The conus medullaris terminates at T12/L1. Signal and caliber of the distal  spinal cord are within normal limits. There is no pathologic intrathecal  enhancement. The paraspinal soft tissues are within normal limits. Lower thoracic spine: No herniation or stenosis. L1-L2: No herniation or stenosis. L2-L3: No herniation or stenosis. L3-L4: Mild facet arthropathy. Minimal central disc protrusion. Mild canal  stenosis. Foramina are patent  L4-L5: Desiccation. Mild facet arthropathy. Canal demonstrates minimal stenosis. There is an annular ligament tear far laterally on the left. Mild left foraminal  stenosis. L5-S1: Mild facet arthropathy. Canal and foramina are patent.      IMPRESSION:  Mild disc degenerative change at L3-4 and L4-5. Mild canal stenosis at L3-4 and mild left foraminal stenosis at L4-5. Other less severe degenerative findings are as described above. CT chest/abdomen 12/16/19:  THYROID: No nodule. MEDIASTINUM: No mass or lymphadenopathy. Port catheter in place on the right. Catheter tip in appropriate position. SB: No mass or lymphadenopathy. THORACIC AORTA: No dissection or aneurysm. MAIN PULMONARY ARTERY: Unremarkable  TRACHEA/BRONCHI: Unremarkable  ESOPHAGUS: No wall thickening or dilatation. HEART: Normal in size. PLEURA: No effusion or pneumothorax. LUNGS: Bibasilar atelectasis is noted. LIVER: No mass or biliary dilatation. GALLBLADDER: Layering contrast versus cholelithiasis. SPLEEN: No mass. PANCREAS: No mass or ductal dilatation. ADRENALS: The left adrenal gland is elevated by the retroperitoneal mass. The    right is unremarkable. KIDNEYS: There is diminished soft tissue density at the level of the left renal  hilum. There is increased left renal cortical atrophy. STOMACH: Unremarkable. SMALL BOWEL: No dilatation or wall thickening. COLON: No dilatation or wall thickening. APPENDIX: Unremarkable. PERITONEUM: No ascites or pneumoperitoneum. RETROPERITONEUM:   Diminished size of retroperitoneal mass. Diminished in size with margins difficult to fully ascertain. 2-62 35 x 50 mm previously 71 x 94 mm.     2-67 left periaortic adenopathy 25 x 17 mm  The SMA, celiac, and LIZZY are remain encased, diminished attenuation of these  vessels. REPRODUCTIVE ORGANS: The prostate and seminal vesicles are unremarkable. URINARY  BLADDER: Unremarkable  BONES: No destructive bone lesion. ADDITIONAL COMMENTS: Resolved left inguinal pseudoaneurysm. Rosa Billings IMPRESSION:    Baseline research examination. Findings are consistent with interval response to therapy.    Continued diminished size of retroperitoneal mass-adenopathy,  with diminished soft tissue density at the left renal hilum. CT chest/abdomen/pelvis 2/14/22:  1. Annular mass lesion of the right colon with upstream fecal retention  concerning for primary colon neoplasm versus less likely lymphoma. 2. Soft tissue stranding around celiac axis, SMA, and abdominal aorta may  reflect infiltrative lymphoma. 3. Left renal atrophy with left hydronephrosis likely reflecting chronic  proximal ureteral obstruction. 4. Incidentals as above. CT chest/abdomen/pelvis 7/19/22:  Response to treatment characterized by decrease in size of the apical core  lesion in the proximal transverse colon and decreased mucosal colic  lymphadenopathy. Persistent mesenteric lymphadenopathy and retroperitoneal/mesenteric soft tissue  which may relate to the patient's history of lymphoma. Chronic left renal atrophy with chronic left hydronephrosis. CT chest/abdomen/pelvis 10/10/22: Increase in peritoneal disease compared to the prior examination. Stable  mesenteric infiltrate. Improvement in the mass lesion involving the transverse  colon. CT chest/abdomen/pelvis 12/29/22:  Progression of disease with increase in size of perihepatic  infiltrative disease, increased in size and number of abdominal metastases, and  new soft tissue metastases in the anterior abdominal wall. Soft tissue  infiltrating around the celiac axis, SMA, and renal arteries and veins is not  significant changed. Incidentals as above with no CT evidence of metastatic  disease to the thorax.       CONTROLLED SUBSTANCES SAFETY ASSESSMENT (IF ON CONTROLLED SUBSTANCES):     Reviewed opioid safety handout:  [x] Yes   [] No  24 hour opioid dose >150mg morphine equivalent/day:  [] Yes   [x] No  Benzodiazepines:  [] Yes   [x] No  Sleep apnea:  [] Yes   [x] No  Urine Toxicology Testing within last 6 months:  [x] Yes   [] No (8/22/22 + as expected for oxycodone/metabolites)  History of or new aberrant medication taking behaviors:  [] Yes   [x] No  Has Narcan been prescribed [x] Yes   [] No          Total time:   Counseling / coordination time:   > 50% counseling / coordination?:

## 2023-01-22 DIAGNOSIS — R10.84 ABDOMINAL PAIN, GENERALIZED: ICD-10-CM

## 2023-01-22 DIAGNOSIS — C18.9 COLON ADENOCARCINOMA (HCC): ICD-10-CM

## 2023-01-23 ENCOUNTER — TELEPHONE (OUTPATIENT)
Dept: ONCOLOGY | Age: 48
End: 2023-01-23

## 2023-01-23 ENCOUNTER — HOSPITAL ENCOUNTER (OUTPATIENT)
Dept: INFUSION THERAPY | Age: 48
Discharge: HOME | End: 2023-01-23
Payer: MEDICAID

## 2023-01-23 ENCOUNTER — OFFICE VISIT (OUTPATIENT)
Dept: PALLATIVE CARE | Age: 48
End: 2023-01-23
Payer: MEDICAID

## 2023-01-23 ENCOUNTER — OFFICE VISIT (OUTPATIENT)
Dept: ONCOLOGY | Age: 48
End: 2023-01-23

## 2023-01-23 ENCOUNTER — APPOINTMENT (OUTPATIENT)
Dept: INFUSION THERAPY | Age: 48
End: 2023-01-23
Payer: MEDICAID

## 2023-01-23 VITALS
WEIGHT: 137.2 LBS | HEART RATE: 77 BPM | RESPIRATION RATE: 16 BRPM | TEMPERATURE: 97.8 F | OXYGEN SATURATION: 99 % | BODY MASS INDEX: 21.53 KG/M2 | HEIGHT: 67 IN | DIASTOLIC BLOOD PRESSURE: 68 MMHG | SYSTOLIC BLOOD PRESSURE: 94 MMHG

## 2023-01-23 VITALS
OXYGEN SATURATION: 99 % | HEART RATE: 75 BPM | HEIGHT: 67 IN | WEIGHT: 137.2 LBS | DIASTOLIC BLOOD PRESSURE: 67 MMHG | TEMPERATURE: 97.8 F | SYSTOLIC BLOOD PRESSURE: 99 MMHG | RESPIRATION RATE: 16 BRPM | BODY MASS INDEX: 21.53 KG/M2

## 2023-01-23 DIAGNOSIS — C18.9 COLON ADENOCARCINOMA (HCC): ICD-10-CM

## 2023-01-23 DIAGNOSIS — Z51.11 CHEMOTHERAPY MANAGEMENT, ENCOUNTER FOR: ICD-10-CM

## 2023-01-23 DIAGNOSIS — M79.2 NEUROPATHIC PAIN: ICD-10-CM

## 2023-01-23 DIAGNOSIS — L70.8 ACNEIFORM RASH: ICD-10-CM

## 2023-01-23 DIAGNOSIS — D70.1 CHEMOTHERAPY INDUCED NEUTROPENIA (HCC): Primary | ICD-10-CM

## 2023-01-23 DIAGNOSIS — F32.9 REACTIVE DEPRESSION: ICD-10-CM

## 2023-01-23 DIAGNOSIS — Z76.89 PREVENTION OF CHEMOTHERAPY-INDUCED NEUTROPENIA: ICD-10-CM

## 2023-01-23 DIAGNOSIS — M54.50 CHRONIC LEFT-SIDED LOW BACK PAIN WITHOUT SCIATICA: ICD-10-CM

## 2023-01-23 DIAGNOSIS — R10.84 ABDOMINAL PAIN, GENERALIZED: Primary | ICD-10-CM

## 2023-01-23 DIAGNOSIS — T45.1X5A CHEMOTHERAPY INDUCED NEUTROPENIA (HCC): Primary | ICD-10-CM

## 2023-01-23 DIAGNOSIS — L70.8 ACNEIFORM RASH: Primary | ICD-10-CM

## 2023-01-23 DIAGNOSIS — G89.3 NEOPLASM RELATED PAIN: ICD-10-CM

## 2023-01-23 DIAGNOSIS — R11.0 NAUSEA WITHOUT VOMITING: ICD-10-CM

## 2023-01-23 DIAGNOSIS — R63.0 POOR APPETITE: ICD-10-CM

## 2023-01-23 DIAGNOSIS — C18.4 CARCINOMA OF TRANSVERSE COLON (HCC): Primary | ICD-10-CM

## 2023-01-23 DIAGNOSIS — R11.2 NAUSEA AND VOMITING, UNSPECIFIED VOMITING TYPE: ICD-10-CM

## 2023-01-23 DIAGNOSIS — E86.1 HYPOVOLEMIA: ICD-10-CM

## 2023-01-23 DIAGNOSIS — C78.6 PERITONEAL CARCINOMATOSIS (HCC): ICD-10-CM

## 2023-01-23 DIAGNOSIS — G89.29 CHRONIC LEFT-SIDED LOW BACK PAIN WITHOUT SCIATICA: ICD-10-CM

## 2023-01-23 DIAGNOSIS — R53.83 OTHER FATIGUE: ICD-10-CM

## 2023-01-23 DIAGNOSIS — F41.9 ANXIETY: ICD-10-CM

## 2023-01-23 DIAGNOSIS — I25.2 HISTORY OF ST ELEVATION MYOCARDIAL INFARCTION (STEMI): ICD-10-CM

## 2023-01-23 LAB
ALBUMIN SERPL-MCNC: 2.9 G/DL (ref 3.5–5)
ALBUMIN/GLOB SERPL: 0.9 (ref 1.1–2.2)
ALP SERPL-CCNC: 251 U/L (ref 45–117)
ALT SERPL-CCNC: 15 U/L (ref 12–78)
ANION GAP SERPL CALC-SCNC: 6 MMOL/L (ref 5–15)
AST SERPL-CCNC: 15 U/L (ref 15–37)
BASOPHILS # BLD: 0.1 K/UL (ref 0–0.1)
BASOPHILS NFR BLD: 1 % (ref 0–1)
BILIRUB SERPL-MCNC: 0.7 MG/DL (ref 0.2–1)
BILIRUB SERPL-MCNC: 0.7 MG/DL (ref 0.2–1)
BUN SERPL-MCNC: 10 MG/DL (ref 6–20)
BUN/CREAT SERPL: 13 (ref 12–20)
CALCIUM SERPL-MCNC: 8.8 MG/DL (ref 8.5–10.1)
CHLORIDE SERPL-SCNC: 105 MMOL/L (ref 97–108)
CO2 SERPL-SCNC: 26 MMOL/L (ref 21–32)
CREAT SERPL-MCNC: 0.76 MG/DL (ref 0.7–1.3)
DIFFERENTIAL METHOD BLD: ABNORMAL
EOSINOPHIL # BLD: 0.3 K/UL (ref 0–0.4)
EOSINOPHIL NFR BLD: 5 % (ref 0–7)
ERYTHROCYTE [DISTWIDTH] IN BLOOD BY AUTOMATED COUNT: 15.2 % (ref 11.5–14.5)
GLOBULIN SER CALC-MCNC: 3.2 G/DL (ref 2–4)
GLUCOSE SERPL-MCNC: 104 MG/DL (ref 65–100)
HCT VFR BLD AUTO: 30.5 % (ref 36.6–50.3)
HGB BLD-MCNC: 10 G/DL (ref 12.1–17)
IMM GRANULOCYTES # BLD AUTO: 0 K/UL (ref 0–0.04)
IMM GRANULOCYTES NFR BLD AUTO: 0 % (ref 0–0.5)
LYMPHOCYTES # BLD: 1 K/UL (ref 0.8–3.5)
LYMPHOCYTES NFR BLD: 20 % (ref 12–49)
MCH RBC QN AUTO: 31.7 PG (ref 26–34)
MCHC RBC AUTO-ENTMCNC: 32.8 G/DL (ref 30–36.5)
MCV RBC AUTO: 96.8 FL (ref 80–99)
MONOCYTES # BLD: 0.5 K/UL (ref 0–1)
MONOCYTES NFR BLD: 10 % (ref 5–13)
NEUTS SEG # BLD: 3.2 K/UL (ref 1.8–8)
NEUTS SEG NFR BLD: 63 % (ref 32–75)
NRBC # BLD: 0 K/UL (ref 0–0.01)
NRBC BLD-RTO: 0 PER 100 WBC
PLATELET # BLD AUTO: 130 K/UL (ref 150–400)
PMV BLD AUTO: 11.1 FL (ref 8.9–12.9)
POTASSIUM SERPL-SCNC: 3.5 MMOL/L (ref 3.5–5.1)
PROT SERPL-MCNC: 6.1 G/DL (ref 6.4–8.2)
RBC # BLD AUTO: 3.15 M/UL (ref 4.1–5.7)
SODIUM SERPL-SCNC: 137 MMOL/L (ref 136–145)
WBC # BLD AUTO: 5 K/UL (ref 4.1–11.1)

## 2023-01-23 PROCEDURE — 96411 CHEMO IV PUSH ADDL DRUG: CPT

## 2023-01-23 PROCEDURE — 80053 COMPREHEN METABOLIC PANEL: CPT

## 2023-01-23 PROCEDURE — 96415 CHEMO IV INFUSION ADDL HR: CPT

## 2023-01-23 PROCEDURE — 74011000258 HC RX REV CODE- 258: Performed by: INTERNAL MEDICINE

## 2023-01-23 PROCEDURE — 96375 TX/PRO/DX INJ NEW DRUG ADDON: CPT

## 2023-01-23 PROCEDURE — 3074F SYST BP LT 130 MM HG: CPT | Performed by: INTERNAL MEDICINE

## 2023-01-23 PROCEDURE — 74011250636 HC RX REV CODE- 250/636: Performed by: INTERNAL MEDICINE

## 2023-01-23 PROCEDURE — 96416 CHEMO PROLONG INFUSE W/PUMP: CPT

## 2023-01-23 PROCEDURE — 96413 CHEMO IV INFUSION 1 HR: CPT

## 2023-01-23 PROCEDURE — 36415 COLL VENOUS BLD VENIPUNCTURE: CPT

## 2023-01-23 PROCEDURE — 3078F DIAST BP <80 MM HG: CPT | Performed by: INTERNAL MEDICINE

## 2023-01-23 PROCEDURE — 99214 OFFICE O/P EST MOD 30 MIN: CPT | Performed by: INTERNAL MEDICINE

## 2023-01-23 PROCEDURE — 99215 OFFICE O/P EST HI 40 MIN: CPT | Performed by: INTERNAL MEDICINE

## 2023-01-23 PROCEDURE — 85025 COMPLETE CBC W/AUTO DIFF WBC: CPT

## 2023-01-23 PROCEDURE — 96417 CHEMO IV INFUS EACH ADDL SEQ: CPT

## 2023-01-23 PROCEDURE — 82247 BILIRUBIN TOTAL: CPT

## 2023-01-23 PROCEDURE — 96361 HYDRATE IV INFUSION ADD-ON: CPT

## 2023-01-23 PROCEDURE — 74011250636 HC RX REV CODE- 250/636: Performed by: NURSE PRACTITIONER

## 2023-01-23 RX ORDER — DEXTROSE MONOHYDRATE 50 MG/ML
5-250 INJECTION, SOLUTION INTRAVENOUS AS NEEDED
Status: DISPENSED | OUTPATIENT
Start: 2023-01-23 | End: 2023-01-23

## 2023-01-23 RX ORDER — OXYCODONE HCL 20 MG/1
20 TABLET, FILM COATED, EXTENDED RELEASE ORAL EVERY 12 HOURS
Qty: 60 TABLET | Refills: 0 | Status: SHIPPED | OUTPATIENT
Start: 2023-01-23 | End: 2023-02-22

## 2023-01-23 RX ORDER — CLINDAMYCIN PHOSPHATE 10 MG/G
GEL TOPICAL 2 TIMES DAILY
Qty: 60 G | Refills: 2 | Status: SHIPPED | OUTPATIENT
Start: 2023-01-23 | End: 2023-01-23

## 2023-01-23 RX ORDER — SODIUM CHLORIDE 0.9 % (FLUSH) 0.9 %
5-40 SYRINGE (ML) INJECTION AS NEEDED
Status: DISPENSED | OUTPATIENT
Start: 2023-01-23 | End: 2023-01-23

## 2023-01-23 RX ORDER — SODIUM CHLORIDE 9 MG/ML
5-250 INJECTION, SOLUTION INTRAVENOUS AS NEEDED
Status: DISPENSED | OUTPATIENT
Start: 2023-01-23 | End: 2023-01-23

## 2023-01-23 RX ORDER — CLINDAMYCIN PHOSPHATE 11.9 MG/ML
1 SOLUTION TOPICAL 2 TIMES DAILY
Qty: 60 ML | Refills: 1 | Status: SHIPPED | OUTPATIENT
Start: 2023-01-23

## 2023-01-23 RX ORDER — ATROPINE SULFATE 0.4 MG/ML
0.4 INJECTION, SOLUTION ENDOTRACHEAL; INTRAMEDULLARY; INTRAMUSCULAR; INTRAVENOUS; SUBCUTANEOUS
Status: ACTIVE | OUTPATIENT
Start: 2023-01-23 | End: 2023-01-23

## 2023-01-23 RX ORDER — SODIUM CHLORIDE 9 MG/ML
5-40 INJECTION INTRAVENOUS AS NEEDED
Status: ACTIVE | OUTPATIENT
Start: 2023-01-23 | End: 2023-01-23

## 2023-01-23 RX ORDER — GABAPENTIN 300 MG/1
300 CAPSULE ORAL
Qty: 30 CAPSULE | Refills: 0 | Status: SHIPPED | OUTPATIENT
Start: 2023-01-23

## 2023-01-23 RX ORDER — ASPIRIN 81 MG/1
81 TABLET ORAL DAILY
Qty: 90 TABLET | Refills: 1 | Status: SHIPPED | OUTPATIENT
Start: 2023-01-23

## 2023-01-23 RX ORDER — HEPARIN 100 UNIT/ML
500 SYRINGE INTRAVENOUS AS NEEDED
Status: ACTIVE | OUTPATIENT
Start: 2023-01-23 | End: 2023-01-23

## 2023-01-23 RX ORDER — SODIUM CHLORIDE 9 MG/ML
1000 INJECTION, SOLUTION INTRAVENOUS ONCE
Status: COMPLETED | OUTPATIENT
Start: 2023-01-23 | End: 2023-01-23

## 2023-01-23 RX ORDER — FLUOROURACIL 50 MG/ML
400 INJECTION, SOLUTION INTRAVENOUS ONCE
Status: COMPLETED | OUTPATIENT
Start: 2023-01-23 | End: 2023-01-23

## 2023-01-23 RX ORDER — PALONOSETRON 0.05 MG/ML
0.25 INJECTION, SOLUTION INTRAVENOUS ONCE
Status: COMPLETED | OUTPATIENT
Start: 2023-01-23 | End: 2023-01-23

## 2023-01-23 RX ADMIN — DEXAMETHASONE SODIUM PHOSPHATE 12 MG: 4 INJECTION, SOLUTION INTRAMUSCULAR; INTRAVENOUS at 11:56

## 2023-01-23 RX ADMIN — LEUCOVORIN CALCIUM 684 MG: 200 INJECTION, POWDER, LYOPHILIZED, FOR SUSPENSION INTRAMUSCULAR; INTRAVENOUS at 13:30

## 2023-01-23 RX ADMIN — PALONOSETRON 0.25 MG: 0.05 INJECTION, SOLUTION INTRAVENOUS at 08:29

## 2023-01-23 RX ADMIN — SODIUM CHLORIDE 1000 ML: 9 INJECTION, SOLUTION INTRAVENOUS at 10:55

## 2023-01-23 RX ADMIN — DEXTROSE MONOHYDRATE 25 ML/HR: 50 INJECTION, SOLUTION INTRAVENOUS at 13:29

## 2023-01-23 RX ADMIN — FLUOROURACIL 684 MG: 50 INJECTION, SOLUTION INTRAVENOUS at 15:30

## 2023-01-23 RX ADMIN — FLUOROURACIL 4104 MG: 50 INJECTION, SOLUTION INTRAVENOUS at 15:35

## 2023-01-23 RX ADMIN — IRINOTECAN HYDROCHLORIDE 308 MG: 20 INJECTION, SOLUTION INTRAVENOUS at 13:34

## 2023-01-23 RX ADMIN — SODIUM CHLORIDE 25 ML/HR: 9 INJECTION, SOLUTION INTRAVENOUS at 11:56

## 2023-01-23 RX ADMIN — PANITUMUMAB 372.6 MG: 400 SOLUTION INTRAVENOUS at 12:20

## 2023-01-23 NOTE — PROGRESS NOTES
Palliative Medicine Office Visit  Palliative Medicine Nurse Check In  [phone])    Patient Name: Dominic Vera. YOB: 1975      Date of Office Visit:     Patient states: \"  \"    From Check In Sheet (scanned in Media):  Has a medical provider talked with you about cardiopulmonary resuscitation (CPR)? [] Yes   [] No   [] Unable to obtain    Nurse reminder to complete or update ACP FlowSheet:    Is ACP on the Problem List?    [] Yes    [] No  IF ACP Document is ON FILE; Nurse to place ACP on Problem List     Is there an ACP Note in Chart Review/Note? [] Yes    [] No   If NO: [address] Planning 1/9/2023   Patient's Healthcare Decision Maker is: Legal Next of Kin   Confirm Advance Directive None   Patient Would Like to Complete Advance Directive -   Does the patient have other document types -       Is there anything that we should know about you as a person in order to provide you the best care possible? Have you been to the ER, urgent care clinic since your last visit? [] Yes   [x] No   [] Unable to obtain    Have you been hospitalized since your last visit? [] Yes   [x] No   [] Unable to obtain    Have you seen or consulted any other health care providers outside of the [address] [place] [address] since your last visit? [] Yes   [x] No   [] Unable to obtain    Functional status (describe):         Last BM: 1/23/2023     accessed (date):      Bottle review (for opioid pain medication):  Medication 1:   Date filled:   Directions:   # filled:   # left:   # pills taking per day:  Last dose taken:    Medication 2:   Date filled:   Directions:   # filled:   # left:   # pills taking per day:  Last dose taken:    Medication 3:   Date filled:   Directions:   # filled:   # left:   # pills taking per day:  Last dose taken:    Medication 4:   Date filled:   Directions:   # filled:   # left:   # pills taking per day:  Last dose taken:

## 2023-01-23 NOTE — PATIENT INSTRUCTIONS
Dear Emely . ,    It was a pleasure seeing you today for an office visit. We will see you again in 2 weeks for an office visit to coordinate with your infusion. If labs or imaging tests have been ordered for you today, please call the office  at [phone] 48 hours after completion to obtain the results. Your described symptoms were: Fatigue: 8 Drowsiness: 9   Depression: 4 Pain: 10   Anxiety: 10 Nausea: 7   Anorexia: 6 Dyspnea: 0   Best Well-Bein Constipation: No   Other Problem (Comment): 0       This is the plan we talked about:    1. Abdominal pain/neuropathic pain  -You continue to have 6/10 abdominal pain when sitting  -You also continue to experience 10/10 burning shakira-umbilical pain when you stand  -Today Dr. Jarocho Humphrey referred you to Radiation Therapy at [facility] and you have an appointment on Wednesday  -Continue oxycodone 10-mg: take 2 tabs every 4 hours as needed  -Continue extended-release oxycodone to 20-mg every 12 hours  -Continue gabapentin 300-mg: take 1 cap at bedtime   -Today we discussed increasing extended-release oxycodone to every 8 hours and you wish to continue with your current oxycodone doses with the hope radiation therapy may provide relief     2. Low back pain  -This has been chronic since lymphoma diagnosis several years ago  -Continue oxycodone as above    3. Depression/anxiety  -You have chronic anxiety which has increased with the news of your cancer progression and your increased pain  -You've met with our clinical , Sabina Hatchet, in the past  -Continue Lexapro 20-mg daily  -I'm avoiding benzodiazepines due to synergistic effect with opioids    4. Poor appetite/weight loss  -Your appetite is poor and you've lost 10 pounds recently  -Aarti Ward, the [facility], reached out to you recently    6. Fatigue  -This is chronic and unchanged   -You're sleeping about 5 hours at night, except on nights when you take dexamethasone    7. Nausea  -Continue ondansetron 8-mg every 8 hours as needed  -Continue prochlorperazine 10-mg every 6 hours as needed    8. Colon mass  -You're receiving chemotherapy under the care of Dr. Wanda Wong      This is what you have shared with us about Advance Care Planning:      Primary Decision Maker: Jordan Kahn - Girlfriend - [phone]  Advance Care Planning 1/9/2023   Patient's [employer] is: Legal Next of Kin   Confirm Advance Directive None   Patient Would Like to Complete Advance Directive -   Does the patient have other document types -           The Palliative Medicine Team is here to support you and your family.        Sincerely,      Issa Louis MD and the Palliative Medicine [facility]

## 2023-01-23 NOTE — TELEPHONE ENCOUNTER
Sheryle Dus from [facility] called stating that patient test results should be available next week.      # [phone]

## 2023-01-23 NOTE — PROGRESS NOTES
[facility] Progress Note    Date: 2023    Name: Corrina Rousseau. MRN: [id number]         : 1975    Mr. Carmita Donahue Arrived ambulatory and in no distress for C2D1 of Folfiri and vectibix Regimen. Assessment was completed, patient actively vomiting and having nausea 10/10 pain in abdomen and acne like rash to trunk and face. R chest wall port accessed without difficulty, labs drawn & sent for processing by Kiko Grove RN. Patient given antiemetics before going to MD visit. Bolus started when patient returned to [facility]. Chemotherapy Flowsheet 2023   Cycle C2D1   Date 2023   Drug / Regimen Folfiri+Bevacizumab   Post Hydration -   Pre Meds -   Notes -        Patient proceed to appointment with Dr. Noni Dominguez. Mr. Justus Burks vitals were reviewed. Visit Vitals  BP 94/68   Pulse 77   Temp 97.8 °F (36.6 °C)   Resp 16   Ht 5' 7\" (1.702 m)   Wt 62.2 kg (137 lb 3.2 oz)   SpO2 99%   BMI 21.49 kg/m²       Lab results were obtained and reviewed. Recent Results (from the past 12 hour(s))   CBC WITH AUTOMATED DIFF    Collection Time: 23  8:14 AM   Result Value Ref Range    WBC 5.0 4.1 - 11.1 K/uL    RBC 3.15 (L) 4.10 - 5.70 M/uL    HGB 10.0 (L) 12.1 - 17.0 g/dL    HCT 30.5 (L) 36.6 - 50.3 %    MCV 96.8 80.0 - 99.0 FL    MCH 31.7 26.0 - 34.0 PG    MCHC 32.8 30.0 - 36.5 g/dL    RDW 15.2 (H) 11.5 - 14.5 %    PLATELET 350 (L) 705 - 400 K/uL    MPV 11.1 8.9 - 12.9 FL    NRBC 0.0 0  WBC    ABSOLUTE NRBC 0.00 0.00 - 0.01 K/uL    NEUTROPHILS 63 32 - 75 %    LYMPHOCYTES 20 12 - 49 %    MONOCYTES 10 5 - 13 %    EOSINOPHILS 5 0 - 7 %    BASOPHILS 1 0 - 1 %    IMMATURE GRANULOCYTES 0 0.0 - 0.5 %    ABS. NEUTROPHILS 3.2 1.8 - 8.0 K/UL    ABS. LYMPHOCYTES 1.0 0.8 - 3.5 K/UL    ABS. MONOCYTES 0.5 0.0 - 1.0 K/UL    ABS. EOSINOPHILS 0.3 0.0 - 0.4 K/UL    ABS. BASOPHILS 0.1 0.0 - 0.1 K/UL    ABS. IMM.  GRANS. 0.0 0.00 - 0.04 K/UL    DF AUTOMATED     BILIRUBIN, TOTAL    Collection Time: 23  8:14 AM   Result Value Ref Range    Bilirubin, total 0.7 0.2 - 1.0 MG/DL       Medications:  Medications Administered       0.9% sodium chloride infusion       Admin Date  01/23/2023 Action  New Bag Dose  25 mL/hr Rate  25 mL/hr Route  IntraVENous Administered By  [facility]              0.9% sodium chloride infusion 1,000 mL       Admin Date  01/23/2023 Action  New Bag Dose  1,000 mL Rate  1,000 mL/hr Route  IntraVENous Administered By  Carolyn Abad RN              dexamethasone (DECADRON) 12 mg in 0.9% sodium chloride 50 mL IVPB       Admin Date  01/23/2023 Action  New Bag Dose  12 mg Route  IntraVENous Administered By  [facility]              dextrose 5% infusion       Admin Date  01/23/2023 Action  New Bag Dose  25 mL/hr Rate  25 mL/hr Route  IntraVENous Administered By  [facility]              fluorouraciL (ADRUCIL) 4,104 mg in 0.9% sodium chloride 100 mL CADD Cassette       Admin Date  01/23/2023 Action  New Bag Dose  4,104 mg Rate  2.2 mL/hr Route  IntraVENous Administered By  [facility]              fluorouraciL (ADRUCIL) chemo syringe 684 mg       Admin Date  01/23/2023 Action  Given Dose  684 mg Rate  273.6 mL/hr Route  IntraVENous Administered By  [facility]              irinotecan (CAMPTOSAR) 308 mg in dextrose 5% 250 mL, overfill volume 25 mL chemo infusion       Admin Date  01/23/2023 Action  New Bag Dose  308 mg Rate  193.6 mL/hr Route  IntraVENous Administered By  [facility]              leucovorin (WELLCOVORIN) 684 mg in dextrose 5% 250 mL, overfill volume 25 mL IVPB       Admin Date  01/23/2023 Action  New Bag Dose  684 mg Rate  206.1 mL/hr Route  IntraVENous Administered By  [facility]              palonosetron HCl (ALOXI) injection 0.25 mg       Admin Date  01/23/2023 Action  Given Dose  0.25 mg Route  IntraVENous Administered By  Zayra Sr RN              panitumumab (VECTIBIX) 372.6 mg in 0.9% sodium chloride 100 mL, overfill volume 10 mL infusion       Admin Date  01/23/2023 Action  New Bag Dose  372.6 mg Rate  128.6 mL/hr Route  IntraVENous Administered By  Kyle Lepe                         Two nurses verified prior to administering: Drug name, drug dose, infusion volume or drug volume when prepared in a syringe, rate of administration, route of administration,  expiration dates and/or times, appearance and physical integrity of the drugs, rate set on infusion pump, when used sequencing of drug administration. Mr. Darinel Lockhart tolerated treatment well and was discharged from [place] [address] in stable condition. CADD pump infusing. He is to return on January 25  for his next appointment.     Megan Anne  January 23, 2023

## 2023-01-23 NOTE — PROGRESS NOTES
1. Have you been to the ER, urgent care clinic since your last visit? Hospitalized since your last visit? No    2. Have you seen or consulted any other health care providers outside of the [address] [place] [address] since your last visit? Include any pap smears or colon screening.  No        Visit Vitals  BP 94/68   Pulse 77   Temp 97.8 °F (36.6 °C)   Resp 16   Ht 5' 7\" (1.702 m)   Wt 137 lb 3.2 oz (62.2 kg)   SpO2 99%   BMI 21.49 kg/m²            Chief Complaint   Patient presents with    Follow-up    Colon Cancer

## 2023-01-24 ENCOUNTER — TELEPHONE (OUTPATIENT)
Dept: PALLATIVE CARE | Age: 48
End: 2023-01-24

## 2023-01-24 NOTE — TELEPHONE ENCOUNTER
The patient is not sure if he should continue with Gabapentin. If so he is requesting a refill. He is down to 3 pills.  To be called in to [facility].

## 2023-01-25 ENCOUNTER — APPOINTMENT (OUTPATIENT)
Dept: INFUSION THERAPY | Age: 48
End: 2023-01-25

## 2023-01-25 ENCOUNTER — HOSPITAL ENCOUNTER (OUTPATIENT)
Dept: INFUSION THERAPY | Age: 48
Discharge: HOME | End: 2023-01-25
Payer: MEDICAID

## 2023-01-25 VITALS
TEMPERATURE: 98 F | DIASTOLIC BLOOD PRESSURE: 70 MMHG | HEART RATE: 75 BPM | OXYGEN SATURATION: 100 % | RESPIRATION RATE: 16 BRPM | SYSTOLIC BLOOD PRESSURE: 110 MMHG

## 2023-01-25 DIAGNOSIS — C18.9 COLON ADENOCARCINOMA (HCC): ICD-10-CM

## 2023-01-25 DIAGNOSIS — Z76.89 PREVENTION OF CHEMOTHERAPY-INDUCED NEUTROPENIA: ICD-10-CM

## 2023-01-25 DIAGNOSIS — T45.1X5A CHEMOTHERAPY INDUCED NEUTROPENIA (HCC): Primary | ICD-10-CM

## 2023-01-25 DIAGNOSIS — D70.1 CHEMOTHERAPY INDUCED NEUTROPENIA (HCC): Primary | ICD-10-CM

## 2023-01-25 PROCEDURE — 96523 IRRIG DRUG DELIVERY DEVICE: CPT

## 2023-01-25 PROCEDURE — 74011000250 HC RX REV CODE- 250: Performed by: INTERNAL MEDICINE

## 2023-01-25 PROCEDURE — 74011250636 HC RX REV CODE- 250/636: Performed by: INTERNAL MEDICINE

## 2023-01-25 RX ORDER — SODIUM CHLORIDE 0.9 % (FLUSH) 0.9 %
5-40 SYRINGE (ML) INJECTION AS NEEDED
Status: DISCONTINUED | OUTPATIENT
Start: 2023-01-25 | End: 2023-01-26 | Stop reason: HOSPADM

## 2023-01-25 RX ORDER — SODIUM CHLORIDE 9 MG/ML
5-40 INJECTION INTRAVENOUS AS NEEDED
Status: DISCONTINUED | OUTPATIENT
Start: 2023-01-25 | End: 2023-01-26 | Stop reason: HOSPADM

## 2023-01-25 RX ORDER — HEPARIN 100 UNIT/ML
500 SYRINGE INTRAVENOUS AS NEEDED
Status: DISCONTINUED | OUTPATIENT
Start: 2023-01-25 | End: 2023-01-26 | Stop reason: HOSPADM

## 2023-01-25 RX ADMIN — HEPARIN 500 UNITS: 100 SYRINGE at 15:00

## 2023-01-25 RX ADMIN — Medication 10 ML: at 15:00

## 2023-01-25 NOTE — TELEPHONE ENCOUNTER
Prescription for Gabapentin 300 mg sent in to [facility] pharmacy on 1/23/2023.  Patient notified, and will contact pharmacy for prescription processing

## 2023-01-26 NOTE — PROGRESS NOTES
[id number] [facility] at [facility]  [phone]    Hematology / Oncology Established Visit    Reason for Visit:   Miley Watts is a 41 y.o. male who in follow up of colon cancer. Hematology Oncology Treatment History:     Diagnosis #1: Follicular lymphoma  Stage: IV  Pathology:   11/13/18 right inguinal LN excision: Follicular lymphoma, high-grade (grade 3a of 3). Prior Treatment:   1. Obinutuzumab-CHOP. Obinutuzumab: 1000 mg weekly on days 1, 8, 15 for cycle 1, then 1000 mg on day 1 q21 days for cycles 2-6, then monotherapy 1000 mg every 21 days for cycle 7, 8 with Cyclophosphamide 750mg/m2, Doxorubicin 50mg/m2, Vincristine 1.4mg/m2 on day 1 and Prednisone 100mg on Days 1-5, every 21 days for a total of 2 cycles completed late 1/2019. Regimen discontinued due to STEMI. 2. Obinutuzumab + Bendamustine: 1000 mg Obinutuzumab on day 1 + Bendamustine 90mg/m2 on days 1-2 on a 28-day cycle x 4 cycles, completed 5/2019  Current Treatment: Surveillance      Diagnosis #2: Colon cancer:  Stage: IV  Pathology:    2/19/22 Ascending colon; biopsy: poorly differentiated carcinoma  Comment: No dysplasia is identified. Immunohistochemical stains performed on block 1A, show the tumor positive for Cam5.2 and shows patchy positivity for CDX2 with a Ki-67 index of -90%; the tumor is focally positive for CK5/6 and GATA3; negative for p63, Pax8, CK7, CK20, panmelanoma, synaptophysin and chromogranin. The immunophenotypic features are nonspecific but argues somewhat against the following carcinoma: urothelial, neuroendocrine and breast. The immunophenotypic features also argues against melanoma and somewhat against classic colorectal carcinoma. Additional immunohistochemical stains to exclude the possibility of prostate and hepatocellular carcinoma have been   ordered; the results will be reported as an addendum.   -MLH1/MSH2/MSH6/PMS2 all intact with no loss of nuclear expression of MMR proteins - no MSI   Addendum: The addendum is issued to report the results of additional immunohistochemical stains in an attempt to ascertain the primary site of the carcinoma. The diagnosis is unchanged. Hepar and glypican 3 immunohistochemical stains are neg, arguing against hepatocellular carcinoma. PSA stain is negative, arguing against prostatic primary. Imaging studies to eval for poss primary sites maybe useful. In the absence of carcinoma/tumor at any other sites, this may represent an undifferentiated carcinoma of the colon.  -Oncogenomics (molecular) studies: POLE< ARID1A, YVONNE, ATR, BRCA2, CHECK1, FANCI, NF1, PIK3CA, PTCH1, PTEN, RAD50, RAD51, RB1, SMARCA4, STK11  -Neogenomics: KRAS exon 2 and exon 3 not detected, a VUS p. E98K is detected in Exon 4 of KRAS, PD-L1 28-8 (Opdivo) for gastric/GEJ/EAC no expression, PD-L1 (22C3 pharmDx) TPS 1% (pembrolizumab), BRAF mutation not detected, NRAS exon 2 not detected, NRAS 3 not detected, NRAS 4 not detected. Prior Treatment:   1. Loop ileostomy 2/19/22  2. Diagnostic laparoscopy with peritoneal biopsy, 4/27/22  3. FOLFOXIRI every 2 weeks until disease progression or toxicity, 5/16/22 - 12/2022. Current Treatment: FOLFIRI + Panitumumab (6 mg/kg) every 14 days, to start 1/9/23     Oncologic History:  Was in his usual state of health in early November 2018 when he developed low back pain and presented to the ER. Work-up at outside hospital revealed a retroperitoneal mass seen on CT imaging, and he was transferred to [facility] for further work-up. CT there showed a large retroperitoneal mass encircling the aorta with invasion of the left renal hilum and left adrenal gland. There were bilateral inguinal lymph nodes and moderate left hydronephrosis. He was evaluated while at [facility] and was noted to have palpable nodes in his groin for approximately the past 1 month.  He underwent excisional LN biopsy of right inguinal LN, which revealed follicular lymphoma. At time of diagnosis, he had no cardiac disease aside from HTN, hyperlipidemia. However, he did have an NSTEMI after cycle 2 of O-CHOP. Was likely unrelated to chemotherapy, but opted to switch treatment to Obinutuzumab-Bendamustine. He completed treatment in 5/2019 and had a CR based on PET. History of Present Illness:   Mr. José Garcia is a 41 y.o. male with is seen for follow up of colon cancer C3 of FOLFIRI + Panitumumab. Established with Dr. Ana Rosa Seals - started palliative radiation on Wednesday with minimal improvement in abdominal pain. Planned 3-4 weeks of radiation. Reports increased liquid stools in bag, emptying bag 5-6 times daily. No blood in stool. Reports constant nausea, improved since he started taking zofran on Friday. PMHx: Lymphoma in 2018, CAD, HTN, Hyperlipidemia, Anxiety, chronic low back pain  PSurgHx: None aside from cardiac stent placement and port placement  SHx: Smokes 1/2ppd x past 20 yrs. Works part time as a cook. Has 2 children. FHx: Father had leukemia, passed away from pancreatic cancer. Review of Systems: A complete review of systems was obtained, negative except as described above. Physical Exam:     Visit Vitals  /86 (BP 1 Location: Right upper arm, BP Patient Position: Sitting, BP Cuff Size: Adult)   Pulse (!) 113   Temp 98 °F (36.7 °C)   Resp 18   Ht 5' 7\" (1.702 m)   Wt 130 lb 8 oz (59.2 kg)   SpO2 99%   BMI 20.44 kg/m²       ECOG PS: 0  General: thin, no acute distress  Eyes: anicteric sclerae  HENT: Atraumatic  Neck: Supple  Lymphatic: deferred today   Respiratory: CTAB, normal respiratory effort  CV: tachycardia, regular rhythm, no murmurs, no peripheral edema  GI: Tenderness to palpation of umbilicus and RUQ. Hard mass palpated RUQ and above the umbilicus. Colostomy in right lower quadrant with distention due to parastomal hernia  MS: Normal gait and station. Digits without clubbing or cyanosis. Skin: No rashes, ecchymoses, or petechiae. Normal temperature, turgor, and texture. Neuro/Psych: Alert, oriented. Appropriate affect, normal judgment/insight. Results:     Lab Results   Component Value Date/Time    WBC 2.3 (L) 02/06/2023 07:52 AM    HGB 11.0 (L) 02/06/2023 07:52 AM    HCT 33.3 (L) 02/06/2023 07:52 AM    PLATELET 451 (L) 07/09/7898 07:52 AM    MCV 96.0 02/06/2023 07:52 AM    ABS. NEUTROPHILS 0.8 (L) 02/06/2023 07:52 AM    Hemoglobin (POC) 15.0 06/05/2009 02:13 PM    Hematocrit (POC) 39 02/14/2019 01:24 PM     Lab Results   Component Value Date/Time    Sodium 134 (L) 02/06/2023 07:52 AM    Potassium 3.5 02/06/2023 07:52 AM    Chloride 96 (L) 02/06/2023 07:52 AM    CO2 27 02/06/2023 07:52 AM    Glucose 156 (H) 02/06/2023 07:52 AM    BUN 10 02/06/2023 07:52 AM    Creatinine 1.08 02/06/2023 07:52 AM    GFR est AA >60 10/03/2022 07:50 AM    GFR est non-AA >60 10/03/2022 07:50 AM    Calcium 9.0 02/06/2023 07:52 AM    Sodium (POC) 136 02/14/2019 01:24 PM    Potassium (POC) 3.9 02/14/2019 01:24 PM    Chloride (POC) 102 02/14/2019 01:24 PM    Glucose (POC) 249 (H) 02/15/2019 10:21 PM    BUN (POC) 14 02/14/2019 01:24 PM    Creatinine (POC) 0.9 02/14/2019 01:24 PM    Calcium, ionized (POC) 1.24 02/14/2019 01:24 PM     Lab Results   Component Value Date/Time    Bilirubin, total 0.7 02/06/2023 07:52 AM    ALT (SGPT) 13 02/06/2023 07:52 AM    Alk.  phosphatase 296 (H) 02/06/2023 07:52 AM    Protein, total 6.8 02/06/2023 07:52 AM    Albumin 2.8 (L) 02/06/2023 07:52 AM    Globulin 4.0 02/06/2023 07:52 AM     Lab Results   Component Value Date/Time    Iron 18 (L) 05/06/2022 11:13 AM    TIBC 173 (L) 05/06/2022 11:13 AM    Iron % saturation 10 (L) 05/06/2022 11:13 AM    Ferritin 426 (H) 05/06/2022 11:13 AM       No results found for: B12LT, FOL, RBCF  Lab Results   Component Value Date/Time    TSH 1.53 09/28/2016 04:40 AM     Lab Results   Component Value Date/Time    CEA 43.2 01/09/2023 08:09 AM         11/11/18:       Labs at [facility]:  2/26/22: CA 19-9 = 390  22: CEA = 12.3  22: CEA = 19.2  22: CEA = 17.9   22: CEA = 6  22: CEA = 6.8  22: CEA = 10.8  10/3/22: CEA 10.1  22: CEA = 21  22: CEA = 31  23:     CEA = 43 (Started FOLFIRI + Panitumumab)    Signatera MRD:  22: 295.97 MTM/mL  22: 2.98  22: 0.88   10/24/22: 37.87  22: 159.91  (Started FOLFIRI + Panitumumab)    Imagin/9/18 Abd/pelvis CT: IMPRESSION:  1. Interval development of a large retroperitoneal mass encircling the aorta with invasion of the left renal hilum and left adrenal gland. Several adjacent lymph nodes are seen extending into the peritoneum and underneath the  diaphragmatic natalie. This most likely represents lymphoma. 2. Several new bilateral enlarged inguinal lymph nodes also likely representing lymphoma. 3. Moderate left hydronephrosis with a delayed renal nephrogram related to decreased renal function. This is related to the invasion of the renal hilum. 18 Chest CT: IMPRESSION:  Trace left pleural effusion. Bilateral lower lobe atelectasis. Large  retroperitoneal mass lesion again demonstrated. PET 18:  FINDINGS:  HEAD/NECK: Right palatine tonsil intense hypermetabolism, max SUV 18. Multilevel  bilateral cervical adenopathy, with max SUV 12 in a left supraclavicular node. Cerebral evaluation is limited by normal intense activity. CHEST: Solitary hypermetabolic left axillary node, max SUV 11. ABDOMEN/PELVIS: Bulky retroperitoneal mass max SUV 27, with several additional  small active abdomino-pelvic nodes. Bilateral inguinal nodes with max SUV 12 on  the left. SKELETON: No foci of abnormal hypermetabolism in the axial and visualized  appendicular skeleton. IMPRESSION:   1. Right palatine tonsil tumor involvement (Deauville 5). 2. Bilateral cervical delaney involvement (Deauville 5). 3. Left axillary node involvement (Deauville 5).   4. Bulky retroperitoneal lymphoma mass and additional smaller hypermetabolic  abdomino-pelvic nodes (Deauville 5). 5. Bilateral inguinal delaney involvement (Deauville 5). Deauville Five Point Scale  1. No uptake or no residual uptake (when used interim)  2. Slight uptake, but below blood pool (mediastinum)  3. Uptake above mediastinal, but below/equal to uptake in the liver  4. Uptake slightly to moderately higher than liver  5. Markedly increased uptake or any new lesion (on response evaluation)  Each FDG-avid (or previously FDG avid) lesion is rated independently. Reference values:  Mediastinal blood pool: 2.1 SUV  Liver (background): 2.2 SUV    PET/CT 2/05/19:   IMPRESSION:  1. No Foci of Abnormal Hypermetabolism (Deauville 1). 2. Resolved activity in the right palatine tonsil, bilateral cervical nodes,left axillary node, retroperitoneal/abdominal pelvic adenopathy, bilateral inguinal nodes. Echo 2/14/19:  Normal cavity size, wall thickness and systolic function (ejection fraction normal). The muscle mass is normal. The cavity shape is normal. The estimated ejection fraction is 41 - 45%. Abnormal wall motion as described on the wall scoring diagram below. End-systolic volume is normal. Normal left ventricular strain. There is mild (grade 1) left ventricular diastolic dysfunction. Normal left ventricular diastolic pressure. End-diastolic volume is normal.    LE arterial duplex 2/22/19:  There is evidence of left groin pseudoaneurysm noted arising from distal common femoral artery, pseudo lobe measures 2.32cm x 2.58cm and pseudo neck length measuring 0.63cm. There is no evidence of hemodynamically significant left lower extremity arterial obstruction. JACLYN is 1.03 on the right and 1.02 on the left. LE arterial duplex s/p Thrombin Injection to Pseudoaneurysm 2/26/19:  Successful thrombin injection procedure of the left groin with no further flow seen. No evidence of hemodnyamically significant obstruction in the left lower extremity.    Left lower extremity arterial duplex performed. Confirmed pseudoaneurysm in left groin with small neck. Following thrombin injection, no further flow seen in the pseudoaneurysm. The left common femoral, profunda femoral, femoral, popliteal, posterior tibial and anterior arteries were imaged. Mainly triphasic flow was seen with no evidence of significantly elevated velocities. Repeat LE arterial duplex 2/27/19:  Continued thrombosed left groin pseudoaneurysm following thrombin injection on 02/26/2019. No flow or color fill is identified. The hematoma measures approximately 2.1 x 2.9 cm in diameter. The common femoral, deep femoral, femoral, and popliteal arteries are patent with mainly tri-phasic flow and no significant hemodynamically obstruction is noted. Stress 5/31/19:  Normal stress myocardial perfusion without ischemia or infact at 84% MPHR. Normal LV function. LVEF 60%. No EKG changes of ischemia at peak exercise. Normal functional capacity. PET 6/03/19: IMPRESSION: No Foci of Abnormal Hypermetabolism (Deauville 1). -MRI lumbar spine 12/18/19:  Mild disc degenerative change at L3-4 and L4-5. Mild canal stenosis at L3-4 and mild left foraminal stenosis at L4-5. Other less severe degenerative findings are as described above. Continued diminished size of retroperitoneal mass-adenopathy,  with diminished soft tissue density at the left renal hilum. -CT chest/abdomen 12/16/19:  Findings are consistent with interval response to therapy    CT c/a/p 5/28/20: IMPRESSION:  Further contraction of the retroperitoneal mantle and chris mesentery,  compatible with treatment response  Stable left hilar soft tissue mass  Slight increased splaying of the duodenum and proximal jejunum is the result, without obstruction  No evidence for recurrence of lymphoma in the chest, abdomen, or pelvis    CT c/a/p 2/13/22:  FINDINGS:   LOWER THORAX: Dependent atelectasis with otherwise clear lungs.  The visualized  heart is normal in size without pericardial effusion. LIVER: No mass. BILIARY TREE: Gallbladder is unremarkable. CBD is not dilated. SPLEEN: Unremarkable. PANCREAS: No mass or ductal dilatation. ADRENALS: Unremarkable. KIDNEYS: Atrophic left kidney with mild left hydronephrosis. Right kidney is  unremarkable with no stone, enhancing mass, or other renal abnormality. STOMACH: Unremarkable. SMALL BOWEL: No dilatation or wall thickening. COLON: Circumferential wall thickening at the hepatic flexure with luminal  narrowing, adjacent mesenteric stranding, and fluid with upstream retention in  the cecum and fecalization of distal ileal contents. No dilation or other wall  thickening. APPENDIX: Unremarkable. PERITONEUM: Moderate free fluid with no pneumoperitoneum. RETROPERITONEUM: Soft tissue stranding around the celiac and SMA and associated aorta with no discrete mass or adenopathy. Aorta is normal in size without aneurysm or dissection. REPRODUCTIVE ORGANS: Prostate and seminal vesicles appear unremarkable. URINARY BLADDER: No mass or calculus. BONES: No destructive bone lesion. ABDOMINAL WALL: No mass or hernia. ADDITIONAL COMMENTS: N/A  IMPRESSION  1. Annular mass lesion of the right colon with upstream fecal retention  concerning for primary colon neoplasm versus less likely lymphoma. 2. Soft tissue stranding around celiac axis, SMA, and abdominal aorta may  reflect infiltrative lymphoma. 3. Left renal atrophy with left hydronephrosis likely reflecting chronic  proximal ureteral obstruction. 4. Incidentals as above. 2/24/22 CT abd/pelvis at [facility]:  FINDINGS: An enteric tube with sidehole beyond the level of the lower esophageal sphincter is seen looping on itself in the proximal stomach before terminating in the mid stomach. Status post right lower quadrant diverting ileostomy for an obstructing colonic mass.  Multiple loops of gas dilated small bowel remain, with a maximal diameter of 5.4 cm whereas previously measured 3.6 cm. Dilute contrast is seen within the lateral left abdominal dilated small bowel. Moderate stool burden and bowel gas opacifies the cecum, measuring 7.3 cm in diameter. No definite supine evidence of pneumoperitoneum. Lung bases: Limited evaluation of the lung bases demonstrates a cardiac silhouette within normal limits for size. A small bore central venous catheter is seen terminating in the right atrium. No focal consolidation or pleural effusion. 2/24/22 CT abd/pelvis [facility]:  IMPRESSION:  1. Status post right lower quadrant loop ileostomy. Mild diffuse dilation of small bowel proximal to the ostomy in the lower abdomen and upper pelvis concerning for at least mild to moderate partial bowel obstruction. Relatively decompressed loop ileostomy. 2. Mildly complex pelvic fluid collection in the rectovesical space, decreased in size from prior. 3. Redemonstrated ascending colon mass and findings suspicious for regional metastatic disease. 4. Redemonstrated soft tissue in the retroperitoneum with prominent lymph nodes, similar to prior examination. Differential would include metastasis, retroperitoneal fibrosis. 5. Mild atherosclerosis. 6. Subcentimeter right renal hypodensity, too small to characterize. 7. Severe compression of the left renal vein, similar to prior examination. 8. Additional findings as described above. Bones: No acute osseous abnormality. 2/24/22 CT chest [facility]:  IMPRESSION:  FINAL report. 1. No evidence of metastatic disease to the chest.  2. No enlarged lymph nodes. 3. Increasing volume loss in the lung bases which may be due to atelectasis. 4. CT abdomen and pelvis reported separately. 2/25/22 Colonoscopy at [facility]:  Impression:            - Preparation of the colon was inadequate. - Stool in the entire examined colon. - Likely malignant completely obstructing tumor at the                         hepatic flexure. Biopsied.                        - Malignant-appearing tumor in the colon. Complications:         No immediate complications. 7/19/22 CT ch/abd/pelv:  IMPRESSION  Response to treatment characterized by decrease in size of the apical core  lesion in the proximal transverse colon and decreased mucosal colic  lymphadenopathy. Persistent mesenteric lymphadenopathy and retroperitoneal/mesenteric soft tissue  which may relate to the patient's history of lymphoma. Chronic left renal atrophy with chronic left hydronephrosis. RECIST:  Target lesions:  Transverse colon mass, series 2, image 75, 27 x 26 mm, previously 49 x 45 mm. Nontarget lesions:  Transverse mesocolic lymph nodes, decreased. 10/10/22 CT ch/abd/pelv:  IMPRESSION  Increase in peritoneal disease compared to the prior examination. Stable  mesenteric infiltrate. Improvement in the mass lesion involving the transverse  colon. RECIST:  Target lesion:  Transverse colon mass, series 2, image 76, 1.7 x 1.3 cm, previously 2.7 x 2.6cm. Nontarget lesions:   Transverse mesial colic lymph nodes unchanged. 12/30/22 CT ch/abd/pelvis:  IMPRESSION  Progression of disease with increase in size of perihepatic  infiltrative disease, increased in size and number of abdominal metastases, and  new soft tissue metastases in the anterior abdominal wall. Soft tissue  infiltrating around the celiac axis, SMA, and renal arteries and veins is not  significant changed. Incidentals as above with no CT evidence of metastatic  disease to the thorax. Assessment & Plan:   Gianna Meadows. is a 41 y.o. male comes in for evaluation and management of lymphoma. 1. Undifferentiated carcinoma of transverse colon, metastatic:   SUZANNE, PDL1 1%, BRAF/NRAS negative, possibly KRAS wild-type  S/p diverting ileostomy due to large bowel obstruction. Colonoscopy with biopsy on 2/25/22.  No obvious metastatic disease on CT imaging, but did have obvious metastatic disease to peritoneum during laparoscopy with Dr. Salma Bowman. I recommended FOLFOXIRI every 2 weeks. Will not give Bevacizumab given h/o MI and tobacco use. [employer] suggests: BRCA2 and YVONNE mutations support use of Olaparib or similar PARP inhibitor in future. They also suggest testing for TMB, PDL1 to determine utility of checkpoint inhibitors. CT after C9 showed good response with decrease in size of colon mass, but increased peritoneal implants on 10/10/22. Future treatment options include Keytruda/Nivolumab, Olaparib. (KRAS wild type, TMB high suggested on [facility] pathology specimen)  Supportive medications: zofran, compazine, dexamethasone, EMLA topical  -- Hold C3 FOLFIRI + Panitumumab due to neutropenia. Adding Ziextenzo injection with with pump removal.   --  NRAS/BRAF negative. -- Check CEA every 8 weeks  -- Return in 1 week for C3 FOLFIRI + Ricci, MD/NP visit. 2. H/o Follicular lymphoma:   Grade 3a with bulky disease encircling the aorta, causing pain. Bone marrow negative. BR better than RCHOP, but based on GALLIUM study, Obinutuzumab-based induction and maintenance prolongs PFS over that seen with rituximab-based therapy. Therefore, pt started on O-CHOP regimen. Pt achieved CR after C2, but was switched to BR x 4 cycles given STEMI. Completed treatment 5/2019. End of treatment PET 6/3/19 showed CR. No extra surveillance needed at this time given metastatic colon cancer with frequent imaging. Current imaging shows slight increase in soft tissue density in deep pelvis on 10/2022. Will continue to monitor for colon cancer follow up imaging. 3. Chronic lumbar back pain / anxiety / RLQ / acute right upper quadrant and umbilical pain:   Left lower back pain is chronic/stable. RLQ is likely related to neoplasm/colostomy/parastomal hernia. Evaluated by Dr. Salma Bowman who believes umbilical burning pain is due to tumor implants. Pain moderately managed on Oxycontin 20mg q12h, oxycodone 20mg q4h prn, gabapentin.   Following with  [facility]. -- Following with Dr. Khoa Mercedes and receiving 3-4 weeks of daily palliative radiation to tumor implants. 4. CAD / HTN:   h/o STEMI 2/14/19 with TOM placed to proximal LAD. Following with Dr. Bard Krueger. On ASA, Lipitor. Received only 2 doses of cardiotoxic chemo, Doxorubicin in early 1/2019.   -- Repeat echo with Dr. Bard Krueger.   -- Follow up with Dr. Bard Krueger 7/2023.     5. Tobacco abuse:   Previously discussed smoking cessation strategies and benefits. Pt has tried quitting in the past and is not interested. 6. BRCA2 mutation:  Pt would benefit from genetic counseling for himself and his family. 7. Chemotherapy induced neutropenia / thrombocytopenia:  Intermittent. Adding Ziextenzo injection with with pump removal.     8. Acneiform rash:   Present on upper trunk, face. Due to Panitumumab. Continue topical clindamycin BID. 9. Nausea / Vomiting / Diarrhea:   Unclear etiology but may be due to treatment, disease progression. Reviewed taking zofran scheduled and compazine for breakthrough nausea. Monitor. -- 1 L NS today for tachycardia / dehydration. -- Imodium 2mg TID prn diarrhea. Rx sent. -- Fluids with pump removal.     Goals of care: Disease is not curable, but is treatable to improve quality and duration of life. I personally saw and evaluated the patient and performed the key components of medical decision making. The history, physical exam, and documentation were performed by Grace Bashir NP. I reviewed and verified the above documentation and modified it as needed. Hold chemotherapy today given neutropenia. Pt has started on palliative RT as well. Will retry for chemo next week and adding Ziextenzo on D3.      Signed By: Juanetta Habermann, MD

## 2023-01-30 RX ORDER — ALBUTEROL SULFATE 0.83 MG/ML
2.5 SOLUTION RESPIRATORY (INHALATION) AS NEEDED
Status: CANCELLED
Start: 2023-02-06

## 2023-01-30 RX ORDER — ACETAMINOPHEN 325 MG/1
650 TABLET ORAL AS NEEDED
Status: CANCELLED
Start: 2023-02-06

## 2023-01-30 RX ORDER — DEXTROSE MONOHYDRATE 50 MG/ML
5-250 INJECTION, SOLUTION INTRAVENOUS AS NEEDED
Status: CANCELLED | OUTPATIENT
Start: 2023-02-06

## 2023-01-30 RX ORDER — SODIUM CHLORIDE 9 MG/ML
5-250 INJECTION, SOLUTION INTRAVENOUS AS NEEDED
Status: CANCELLED | OUTPATIENT
Start: 2023-02-06

## 2023-01-30 RX ORDER — DIPHENHYDRAMINE HYDROCHLORIDE 50 MG/ML
25 INJECTION, SOLUTION INTRAMUSCULAR; INTRAVENOUS AS NEEDED
Status: CANCELLED
Start: 2023-02-06

## 2023-01-30 RX ORDER — SODIUM CHLORIDE 0.9 % (FLUSH) 0.9 %
5-40 SYRINGE (ML) INJECTION AS NEEDED
Status: CANCELLED | OUTPATIENT
Start: 2023-02-15

## 2023-01-30 RX ORDER — ONDANSETRON 2 MG/ML
8 INJECTION INTRAMUSCULAR; INTRAVENOUS AS NEEDED
Status: CANCELLED | OUTPATIENT
Start: 2023-02-06

## 2023-01-30 RX ORDER — SODIUM CHLORIDE 0.9 % (FLUSH) 0.9 %
5-40 SYRINGE (ML) INJECTION AS NEEDED
Status: CANCELLED | OUTPATIENT
Start: 2023-02-06

## 2023-01-30 RX ORDER — HYDROCORTISONE SODIUM SUCCINATE 100 MG/2ML
100 INJECTION, POWDER, FOR SOLUTION INTRAMUSCULAR; INTRAVENOUS AS NEEDED
Status: CANCELLED | OUTPATIENT
Start: 2023-02-06

## 2023-01-30 RX ORDER — HEPARIN 100 UNIT/ML
500 SYRINGE INTRAVENOUS AS NEEDED
Status: CANCELLED
Start: 2023-02-15

## 2023-01-30 RX ORDER — FLUOROURACIL 50 MG/ML
400 INJECTION, SOLUTION INTRAVENOUS ONCE
Status: CANCELLED | OUTPATIENT
Start: 2023-02-06 | End: 2023-02-06

## 2023-01-30 RX ORDER — SODIUM CHLORIDE 9 MG/ML
5-40 INJECTION INTRAVENOUS AS NEEDED
Status: CANCELLED | OUTPATIENT
Start: 2023-02-15

## 2023-01-30 RX ORDER — DIPHENHYDRAMINE HYDROCHLORIDE 50 MG/ML
50 INJECTION, SOLUTION INTRAMUSCULAR; INTRAVENOUS AS NEEDED
Status: CANCELLED
Start: 2023-02-06

## 2023-01-30 RX ORDER — SODIUM CHLORIDE 9 MG/ML
5-40 INJECTION INTRAVENOUS AS NEEDED
Status: CANCELLED | OUTPATIENT
Start: 2023-02-06

## 2023-01-30 RX ORDER — HEPARIN 100 UNIT/ML
500 SYRINGE INTRAVENOUS AS NEEDED
Status: CANCELLED
Start: 2023-02-06

## 2023-01-30 RX ORDER — ATROPINE SULFATE 0.4 MG/ML
0.4 INJECTION, SOLUTION ENDOTRACHEAL; INTRAMEDULLARY; INTRAMUSCULAR; INTRAVENOUS; SUBCUTANEOUS
Status: CANCELLED | OUTPATIENT
Start: 2023-02-06

## 2023-01-30 RX ORDER — PALONOSETRON 0.05 MG/ML
0.25 INJECTION, SOLUTION INTRAVENOUS ONCE
Status: CANCELLED | OUTPATIENT
Start: 2023-02-06 | End: 2023-02-06

## 2023-01-30 RX ORDER — EPINEPHRINE 1 MG/ML
0.3 INJECTION, SOLUTION, CONCENTRATE INTRAVENOUS AS NEEDED
Status: CANCELLED | OUTPATIENT
Start: 2023-02-06

## 2023-01-30 RX ORDER — SODIUM CHLORIDE 9 MG/ML
5-250 INJECTION, SOLUTION INTRAVENOUS AS NEEDED
Status: CANCELLED | OUTPATIENT
Start: 2023-02-15

## 2023-01-31 ENCOUNTER — APPOINTMENT (OUTPATIENT)
Dept: INFUSION THERAPY | Age: 48
End: 2023-01-31
Payer: MEDICAID

## 2023-02-02 ENCOUNTER — APPOINTMENT (OUTPATIENT)
Dept: INFUSION THERAPY | Age: 48
End: 2023-02-02
Payer: MEDICAID

## 2023-02-03 ENCOUNTER — TELEPHONE (OUTPATIENT)
Dept: PALLATIVE CARE | Age: 48
End: 2023-02-03

## 2023-02-03 NOTE — TELEPHONE ENCOUNTER
Called patient to advise/confirm upcoming appt with Dr. Bonita Miller on 2/6/23  at 8:30 at [address]. Got voicemail. Left msg. Also advised to please bring in your 's License and Insurance Card and any pain medications in the original container with you to appointment.

## 2023-02-05 NOTE — PROGRESS NOTES
Palliative Medicine Outpatient Services  [employer]: [phone] ([phone])    Patient Name: Trang Andersen YOB: 1975     Date of Current Visit: 02/06/23   Location of Current Visit:    [] [facility] Office  [x] [facility] Office  [] [address] [place] [address]  [] Home  [] Synchronous real-time A/V visit    Date of Initial Visit: 2/19/19   Referral from: Augustina Steward MD  Primary Care Physician: Willy Méndez MD      SUMMARY:   Trang Andersen is a 41y.o. year old with high-grade follicular lymphoma, who was referred to Palliative Medicine by Dr. Td Ace for symptom management and supportive care. He was diagnosed in 11/2018 after presenting with back pain, treated with systemic chemotherapy with complete response. He suffered cardiac arrest during cycle #3 due to previously undiagnosed severe stenosis of the LAD s/p PTCA and stent. He was diagnosed with poorly differentiated carcinoma of the colon (no evidence of metastatic disease) in 2/14/22 and underwent loop ileostomy at [facility] on 2/19/22. He underwent exploratory laparoscopy in 4/2022 which revealed a large tumor at the hepatic flexure peritoneal carcinomatosis. Scans 12/2022 revealed disease progression. He is currently being treated with systemic chemotherapy under the care of Dr. Belkys Quintana with the goal of palliation of symptoms and prolongation of life. The patients social history includes: he is single. He lives in [place] with his girlfriend, Ivis Colon, and their young old son. He has 3 teenage children who live with their mother and with whom he has close contact. 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 Palliative Medicine is addressing the following current patient/family concerns: abdominal pain related to malignancy; anxiety, depression; left mid- and low back pain, poor appetite, fatigue, advanced care planning.     Initial Referral Intake note from Papo Metcalf RN reviewed prior to visit   PALLIATIVE DIAGNOSES:       ICD-10-CM ICD-9-CM    1. Abdominal pain, generalized  R10.84 789.07       2. Chronic left-sided low back pain without sciatica  M54.50 724.2     G89.29 338.29       3. Neuropathic pain  M79.2 729.2       4. Anxiety  F41.9 300.00       5. Reactive depression  F32.9 300.4       6. Poor appetite  R63.0 783.0       7. Nausea without vomiting  R11.0 787.02       8. Other fatigue  R53.83 780.79       9. Peritoneal carcinomatosis ([facility] [address]  C78.6 197.6              PLAN:     Patient Instructions   Dear Yaneth To. ,    It was a pleasure seeing you today for an office visit. We will see you again in 1 week for an office visit to coordinate with your infusion. If labs or imaging tests have been ordered for you today, please call the office  at [phone] 48 hours after completion to obtain the results. Your described symptoms were: Fatigue: 8 Drowsiness: 8   Depression: 6 Pain: 7   Anxiety: 9 Nausea: 5   Anorexia: 10 Dyspnea: 0   Best Well-Bein Constipation: No   Other Problem (Comment): 0       This is the plan we talked about:    1. Abdominal pain/neuropathic pain  -Increase extended-release oxycodone to 20-mg every 8 hours  -Continue oxycodone 10-mg: take 2 tabs every 4 hours as needed  -You've started Radiation Therapy at [facility] and anticipate receiving 15 treatments  -Continue gabapentin 300-mg: take 1 cap at bedtime     2. Low back pain  -This has been chronic since lymphoma diagnosis several years ago, unchanged  -Continue oxycodone as above    3. Depression/anxiety  -You have chronic anxiety which has increased with the news of your cancer progression and your increased pain  -You've met with our clinical , Wade Desouza, in the past  -Continue Lexapro 20-mg daily  -I'm avoiding benzodiazepines due to synergistic effect with opioids    4.  Poor appetite/weight loss/dehydration  -Your appetite is poor   -Brandi Han, the [facility] dietician, reached out to you recently  -Today we discussed drinking Pedialyte or Gatorade for hydration    6. Fatigue  -This is chronic and unchanged   -You continue to sleep about 5 hours at night    7. Nausea  -Continue ondansetron 8-mg every 8 hours as needed  -Continue prochlorperazine 10-mg every 6 hours as needed    8. Colon mass  -You're receiving chemotherapy under the care of Dr. Wyatt Dang      This is what you have shared with us about Advance Care Planning:      Primary Decision Maker: Melissa Ureñafriend - [phone]  Advance Care Planning 2/6/2023   Patient's [address] is: Legal Next of Kin   Confirm Advance Directive None   Patient Would Like to Complete Advance Directive -   Does the patient have other document types -           The Palliative Medicine Team is here to support you and your family. Sincerely,      Zeny Diaz MD and the Palliative Medicine Te     GOALS OF CARE / TREATMENT PREFERENCES:   [====Goals of Care====]  GOALS OF CARE:  Patient / health care proxy stated goals: See Patient Instructions / Summary    TREATMENT PREFERENCES:   Code Status:  [x] Attempt Resuscitation       [] Do Not Attempt Resuscitation    Advance Care Planning:  [x] The [facility] Interdisciplinary Team has updated the ACP Navigator with Decision Maker and Patient Capacity      Primary Decision Maker: Melissa Ureñafriend - [phone]    [] Named in a scanned document   [x] Legal Next of Kin  [] Guardian    Other:  (If patient appropriate for POST, consider using PALLPOST smart phrase here)    The palliative care team has discussed with patient / health care proxy about goals of care / treatment preferences for patient.  [====Goals of Care====]     PRESCRIPTIONS GIVEN:     Medications Ordered Today   Medications    oxyCODONE ER (OxyCONTIN) 20 mg ER tablet     Sig: Take 1 Tablet by mouth every eight (8) hours for 30 days. Max Daily Amount: 60 mg.      Dispense:  90 Tablet     Refill:  0        FOLLOW UP:     Future Appointments   Date Time Provider Dahlia Epps   2/13/2023  7:30 AM SS INF2 CH2 >7H [facility]   2/13/2023  8:45 AM Andre Roman MD [facility] BS AMB   2/15/2023  2:30 PM SS INF6 CH4 <1H [facility]   2/27/2023  7:30 AM SS INF2 CH2 >7H [facility]   3/1/2023  2:00 PM SS INF6 CH4 <1H [facility]   7/12/2023  3:00 PM Andrew Amador MD [facility] BS AMB           PHYSICIANS INVOLVED IN CARE:   Patient Care Team:  Majo Baugh MD as PCP - General (Family Medicine)  Beena Christiansen MD (Hematology and Oncology)  Hailee Jung MD as Physician (Palliative Medicine)  Hailee Jung MD as Physician (Palliative Medicine)       HISTORY:   Reviewed patient-completed ESAS and advance care planning form. Reviewed patient record in prescription monitoring program.    CHIEF COMPLAINT:   Chief Complaint   Patient presents with    Abdominal Pain           HPI/SUBJECTIVE:    The patient is: [x] Verbal / [] Nonverbal     He's still having abdominal pain with the higher IR oxycodone dose. He's started radiation therapy last week but hasn't noticed a difference yet. He had a bad weekend with nausea and vomiting which improved after he took his nausea medicine. See Plan/Patient Instructions for additional interval history      From visit 3/2/22:  He started having abdominal pain about a month ago. Then he started feeling nauseous. He had trouble with bowel movements, feeling like he wasn't completing emptying his bowels. He went to the ED on 2/14, CT scan showed mass in his colon. He was hospitalized at [facility] 2/15-2/25 for colonoscopy and loop ileostomy. He's still having pain in his abdomen. He's been taking 2 oxycodone every 4 to 6 hours which eases the pain to ~5/10 which is tolerable. He's always anxious. He continues to take Lexapro. He's eating, not as much as he used to. He ate 2 PBJs yesterday.     He had mild nausea for a few days after he came home.    He's passing formed stool in his ostomy bag daily. Home health is coming to his home. He sees Dr. Lisa Levine next week. From IV 2/19/19:  He has a lot of anxiety. He's lived with anxiety for a long time, sometimes it's been worse than others, like when he lost his job and lost his insurance. He took Wellbutrin then which made him more anxious and depressed. He's had more anxiety since he was diagnosed with lymphoma. He's been taking lorazepam and it doesn't help at all, even when he tried taking 2 pills. This is his biggest problem now. He feels depressed but it's not as bad as the anxiety. He has pain in his back, that's what brought him to the hospital in November. He's been taking oxycodone which helps some. The groin pain started after his operation (cardiac cath) in the hospital last week. He expects that will get better as it heals. His pain doesn't bother him too much, not like the anxiety. His appetite is getting better. He's lost ~30# since last fall but that's leveled off now. He's moving his bowels regularly. He sometimes gets short of breath but not as much as used to. He doesn't have chest pain, never did. He sleeps well at night. He doesn't have the energy to do much during the day. He lives with his father. He sees his three teen-age children frequently (they live with their mother). His children give him a lot of strength.         Clinical Pain Assessment (nonverbal scale for nonverbal patients):   [++++ Clinical Pain Assessment++++]  [++++Pain Severity++++]: Pain: 7  [++++Pain Character++++]: stabbing pain in back  [++++Pain Duration++++]: months for back pain, weeks for groin pain  [++++Pain Effect++++]: little  [++++Pain Factors++++]: oxycodone helps with back pain, groin pain elicited by standing and walking  [++++Pain Frequency++++]: constant back pain with varying intensity  [++++Pain Location++++]: left lower back  [++++ Clinical Pain Assessment++++]    [++++ Clinical Pain Assessment++++]  [++++Pain Severity++++]: Pain: 7  [++++Pain Character++++]: deep, aching  [++++Pain Duration++++]: since 2022  [++++Pain Effect++++]: limits function, emotional distress  [++++Pain Factors++++]: unable to identify provoking factors  [++++Pain Frequency++++]: constant  [++++Pain Location++++]: right lower abdomen  [++++ Clinical Pain Assessment++++]    [++++ Clinical Pain Assessment++++]  [++++Pain Severity++++]: 0 with sitting, 10/10 with standing  [++++Pain Character++++]: burning  [++++Pain Duration++++]: months  [++++Pain Effect++++]: limits ambulation/function  [++++Pain Factors++++]:   [++++Pain Frequency++++]: intermittent depending upon body position  [++++Pain Location++++]: shakira-umbilical  [++++ Clinical Pain Assessment++++]      FUNCTIONAL ASSESSMENT:     Palliative Performance Scale (PPS):  PPS: 60       PSYCHOSOCIAL/SPIRITUAL SCREENING:     Any spiritual / Jain concerns:  [] Yes /  [x] No    Caregiver Burnout:  [] Yes /  [x] No /  [] No Caregiver Present      Anticipatory grief assessment:   [x] Normal  / [] Maladaptive       ESAS Anxiety: Anxiety: 9    ESAS Depression: Depression: 6       REVIEW OF SYSTEMS:     The following systems were [x] reviewed / [] unable to be reviewed  Systems: constitutional, ears/nose/mouth/throat, respiratory, gastrointestinal, genitourinary, musculoskeletal, integumentary, neurologic, psychiatric, endocrine. Positive findings noted below.   Modified ESAS Completed by: provider   Fatigue: 8 Drowsiness: 8   Depression: 6 Pain: 7   Anxiety: 9 Nausea: 5   Anorexia: 10 Dyspnea: 0   Best Well-Bein Constipation: No   Other Problem (Comment): 0          PHYSICAL EXAM:     Wt Readings from Last 3 Encounters:   23 130 lb (59 kg)   23 130 lb 8 oz (59.2 kg)   23 130 lb 8 oz (59.2 kg)   8# weight loss since 22      Blood pressure (!) 120/99, pulse 99, temperature 98 °F (36.7 °C), temperature source Oral, resp. rate 18, height 5' 7\" (1.702 m), weight 130 lb (59 kg), SpO2 99 %. Last bowel movement: See Nursing Note    Constitutional: frail, ill-appearing  Eyes: pupils equal, anicteric  ENMT: no nasal discharge, moist mucous membranes  Cardiovascular: regular rhythm, no peripheral edema  Respiratory: breathing not labored, symmetric  Gastrointestinal: +bowel sounds; firm masses easily palpable; tenderness with palpation shakira-umbilical area; loose brown stool in ostomy bag  Musculoskeletal: no deformity, no tenderness to palpation  Skin: warm, dry; pale  Neurologic: following commands, moving all extremities  Psychiatric: full affect, no hallucinations  Other:            HISTORY:     Past Medical History:   Diagnosis Date    Anxiety     Anxiety and depression     Cancer ([facility] [address]     lymphoma Nov 2018 receiving chemo    Cancer ([facility] [address] 02/2022    COLON    Chronic pain     lower back- lymphoma    Hyperlipidemia     Hypertension     NO MEDICATION FOR PAST 9 MONTHS    Lymphadenopathy 11/12/2018    Seizures ([facility] [address] CHILDHOOD    NEVER TOOK MEDICATION - \"GREW OUT OF THEM\"      Past Surgical History:   Procedure Laterality Date    HX COLONOSCOPY      HX HEART CATHETERIZATION  02/2019    HX ILEOSTOMY      HX OTHER SURGICAL  02/2019    cardiac stent    IR INJECTION PSEUDOANEURYSM  02/26/2019    MS LAPS ABD PRTM&OMENTUM DX W/WO SPEC BR/WA SPX  04/27/2022    Peritoneal biopsy Dr. Rosa Maria Melo  02/2022    COLON RESECTION WITH ILEOSTOMY    MS UNLISTED PROCEDURE CARDIAC SURGERY  2019    STENT X1      Family History   Problem Relation Age of Onset    Hypertension Father     Diabetes Father     Cancer Father         PROSTATE    Diabetes Mother     No Known Problems Brother     No Known Problems Brother     Anesth Problems Neg Hx       History reviewed, no pertinent family history.   Social History     Tobacco Use    Smoking status: Every Day     Packs/day: 0.50     Years: 20.00     Pack years: 10.00     Types: Cigarettes    Smokeless tobacco: Never    Tobacco comments:     \"STOP SMOKING\" PACKET GIVEN TO PATIENT   Substance Use Topics    Alcohol use: No     No Known Allergies   Current Outpatient Medications   Medication Sig    oxyCODONE IR (ROXICODONE) 10 mg tab immediate release tablet Take 2 Tablets by mouth every four (4) hours as needed for Pain for up to 15 days. Max Daily Amount: 120 mg.    gabapentin (NEURONTIN) 300 mg capsule TAKE 1 CAPSULE BY MOUTH NIGHTLY. MAX DAILY AMOUNT: 300 MG. INDICATIONS: NEUROPATHIC PAIN    aspirin delayed-release 81 mg tablet Take 1 Tablet by mouth daily. oxyCODONE ER (OxyCONTIN) 20 mg ER tablet Take 1 Tablet by mouth every twelve (12) hours for 30 days. Max Daily Amount: 40 mg.    atorvastatin (LIPITOR) 20 mg tablet Take 1 Tablet by mouth daily. lidocaine (LIDODERM) 5 % 1 Patch by TransDERmal route every twenty-four (24) hours. Apply patch to the abdomen for 12 hours a day and remove for 12 hours a day. potassium chloride (KLOR-CON M10) 10 mEq tablet Take 1 Tablet by mouth daily. ondansetron hcl (ZOFRAN) 8 mg tablet Take 1 Tablet by mouth every eight (8) hours as needed for Nausea or Vomiting. prochlorperazine (Compazine) 10 mg tablet Take 1 Tablet by mouth every six (6) hours as needed for Nausea or Vomiting.    lidocaine-prilocaine (EMLA) topical cream Apply a dime size amount to port site 30 minutes before treatment to prevent pain. multivitamin (ONE A DAY) tablet Take 1 Tablet by mouth daily. loperamide (Imodium A-D) 2 mg tablet Take 1 Tablet by mouth three (3) times daily as needed for Diarrhea. (Patient not taking: Reported on 2/6/2023)    clindamycin (CLEOCIN T) 1 % external solution Apply 1 mL to affected area two (2) times a day. use thin film on upper chest, back and face. Apply with cotton swab.  (Patient not taking: No sig reported)    dexAMETHasone (DECADRON) 4 mg tablet Take 8mg (2 x tabs) on days 2 and 3 after treatment to prevent nausea. Take in the AM with food. (Patient not taking: No sig reported)     No current facility-administered medications for this visit. LAB DATA REVIEWED:     Lab Results   Component Value Date/Time    WBC 2.3 (L) 02/06/2023 07:52 AM    HGB 11.0 (L) 02/06/2023 07:52 AM    PLATELET 200 (L) 73/87/3995 07:52 AM     Lab Results   Component Value Date/Time    Sodium 134 (L) 02/06/2023 07:52 AM    Potassium 3.5 02/06/2023 07:52 AM    Chloride 96 (L) 02/06/2023 07:52 AM    CO2 27 02/06/2023 07:52 AM    BUN 10 02/06/2023 07:52 AM    Creatinine 1.08 02/06/2023 07:52 AM    Calcium 9.0 02/06/2023 07:52 AM    Magnesium 1.7 01/12/2019 04:05 AM    Phosphorus 2.0 (L) 01/12/2019 04:05 AM      Lab Results   Component Value Date/Time    Alk. phosphatase 296 (H) 02/06/2023 07:52 AM    Protein, total 6.8 02/06/2023 07:52 AM    Albumin 2.8 (L) 02/06/2023 07:52 AM    Globulin 4.0 02/06/2023 07:52 AM     Lab Results   Component Value Date/Time    INR 1.1 02/22/2019 08:18 PM    Prothrombin time 10.8 02/22/2019 08:18 PM    aPTT 27.8 02/22/2019 08:18 PM      Lab Results   Component Value Date/Time    Iron 18 (L) 05/06/2022 11:13 AM    TIBC 173 (L) 05/06/2022 11:13 AM    Iron % saturation 10 (L) 05/06/2022 11:13 AM    Ferritin 426 (H) 05/06/2022 11:13 AM          MRI lumbar spine 12/18/19:  Congenital narrowing of the lumbar canal. Vertebral body heights are maintained. Marrow signal is normal.     The conus medullaris terminates at T12/L1. Signal and caliber of the distal  spinal cord are within normal limits. There is no pathologic intrathecal  enhancement. The paraspinal soft tissues are within normal limits. Lower thoracic spine: No herniation or stenosis. L1-L2: No herniation or stenosis. L2-L3: No herniation or stenosis. L3-L4: Mild facet arthropathy. Minimal central disc protrusion. Mild canal  stenosis. Foramina are patent  L4-L5: Desiccation. Mild facet arthropathy.  Canal demonstrates minimal stenosis. There is an annular ligament tear far laterally on the left. Mild left foraminal  stenosis. L5-S1: Mild facet arthropathy. Canal and foramina are patent. IMPRESSION:  Mild disc degenerative change at L3-4 and L4-5. Mild canal stenosis at L3-4 and mild left foraminal stenosis at L4-5. Other less severe degenerative findings are as described above. CT chest/abdomen 12/16/19:  THYROID: No nodule. MEDIASTINUM: No mass or lymphadenopathy. Port catheter in place on the right. Catheter tip in appropriate position. SB: No mass or lymphadenopathy. THORACIC AORTA: No dissection or aneurysm. MAIN PULMONARY ARTERY: Unremarkable  TRACHEA/BRONCHI: Unremarkable  ESOPHAGUS: No wall thickening or dilatation. HEART: Normal in size. PLEURA: No effusion or pneumothorax. LUNGS: Bibasilar atelectasis is noted. LIVER: No mass or biliary dilatation. GALLBLADDER: Layering contrast versus cholelithiasis. SPLEEN: No mass. PANCREAS: No mass or ductal dilatation. ADRENALS: The left adrenal gland is elevated by the retroperitoneal mass. The    right is unremarkable. KIDNEYS: There is diminished soft tissue density at the level of the left renal  hilum. There is increased left renal cortical atrophy. STOMACH: Unremarkable. SMALL BOWEL: No dilatation or wall thickening. COLON: No dilatation or wall thickening. APPENDIX: Unremarkable. PERITONEUM: No ascites or pneumoperitoneum. RETROPERITONEUM:   Diminished size of retroperitoneal mass. Diminished in size with margins difficult to fully ascertain. 2-62 35 x 50 mm previously 71 x 94 mm.     2-67 left periaortic adenopathy 25 x 17 mm  The SMA, celiac, and LIZZY are remain encased, diminished attenuation of these  vessels. REPRODUCTIVE ORGANS: The prostate and seminal vesicles are unremarkable. URINARY  BLADDER: Unremarkable  BONES: No destructive bone lesion. ADDITIONAL COMMENTS: Resolved left inguinal pseudoaneurysm. [facility] IMPRESSION:    Baseline research examination. Findings are consistent with interval response to therapy. Continued diminished size of retroperitoneal mass-adenopathy,  with diminished soft tissue density at the left renal hilum. CT chest/abdomen/pelvis 2/14/22:  1. Annular mass lesion of the right colon with upstream fecal retention  concerning for primary colon neoplasm versus less likely lymphoma. 2. Soft tissue stranding around celiac axis, SMA, and abdominal aorta may  reflect infiltrative lymphoma. 3. Left renal atrophy with left hydronephrosis likely reflecting chronic  proximal ureteral obstruction. 4. Incidentals as above. CT chest/abdomen/pelvis 7/19/22:  Response to treatment characterized by decrease in size of the apical core  lesion in the proximal transverse colon and decreased mucosal colic  lymphadenopathy. Persistent mesenteric lymphadenopathy and retroperitoneal/mesenteric soft tissue  which may relate to the patient's history of lymphoma. Chronic left renal atrophy with chronic left hydronephrosis. CT chest/abdomen/pelvis 10/10/22: Increase in peritoneal disease compared to the prior examination. Stable  mesenteric infiltrate. Improvement in the mass lesion involving the transverse  colon. CT chest/abdomen/pelvis 12/29/22:  Progression of disease with increase in size of perihepatic  infiltrative disease, increased in size and number of abdominal metastases, and  new soft tissue metastases in the anterior abdominal wall. Soft tissue  infiltrating around the celiac axis, SMA, and renal arteries and veins is not  significant changed. Incidentals as above with no CT evidence of metastatic  disease to the thorax.       CONTROLLED SUBSTANCES SAFETY ASSESSMENT (IF ON CONTROLLED SUBSTANCES):     Reviewed opioid safety handout:  [x] Yes   [] No  24 hour opioid dose >150mg morphine equivalent/day:  [] Yes   [x] No  Benzodiazepines:  [] Yes   [x] No  Sleep apnea:  [] Yes   [x] No  Urine Toxicology Testing within last 6 months:  [x] Yes   [] No (8/22/22 + as expected for oxycodone/metabolites)  History of or new aberrant medication taking behaviors:  [] Yes   [x] No  Has Narcan been prescribed [x] Yes   [] No          Total time:   Counseling / coordination time:   > 50% counseling / coordination?:

## 2023-02-06 ENCOUNTER — OFFICE VISIT (OUTPATIENT)
Dept: ONCOLOGY | Age: 48
End: 2023-02-06

## 2023-02-06 ENCOUNTER — OFFICE VISIT (OUTPATIENT)
Dept: PALLATIVE CARE | Age: 48
End: 2023-02-06
Payer: MEDICAID

## 2023-02-06 ENCOUNTER — HOSPITAL ENCOUNTER (OUTPATIENT)
Dept: INFUSION THERAPY | Age: 48
Discharge: HOME | End: 2023-02-06
Payer: MEDICAID

## 2023-02-06 VITALS
DIASTOLIC BLOOD PRESSURE: 81 MMHG | RESPIRATION RATE: 16 BRPM | TEMPERATURE: 98 F | OXYGEN SATURATION: 99 % | WEIGHT: 130.5 LBS | HEART RATE: 99 BPM | HEIGHT: 67 IN | SYSTOLIC BLOOD PRESSURE: 120 MMHG | BODY MASS INDEX: 20.48 KG/M2

## 2023-02-06 VITALS
DIASTOLIC BLOOD PRESSURE: 86 MMHG | RESPIRATION RATE: 18 BRPM | SYSTOLIC BLOOD PRESSURE: 123 MMHG | TEMPERATURE: 98 F | HEIGHT: 67 IN | BODY MASS INDEX: 20.48 KG/M2 | HEART RATE: 113 BPM | OXYGEN SATURATION: 99 % | WEIGHT: 130.5 LBS

## 2023-02-06 VITALS
SYSTOLIC BLOOD PRESSURE: 120 MMHG | HEIGHT: 67 IN | TEMPERATURE: 98 F | BODY MASS INDEX: 20.4 KG/M2 | RESPIRATION RATE: 18 BRPM | HEART RATE: 99 BPM | WEIGHT: 130 LBS | DIASTOLIC BLOOD PRESSURE: 99 MMHG | OXYGEN SATURATION: 99 %

## 2023-02-06 DIAGNOSIS — R19.7 DIARRHEA, UNSPECIFIED TYPE: ICD-10-CM

## 2023-02-06 DIAGNOSIS — C78.6 PERITONEAL CARCINOMATOSIS (HCC): ICD-10-CM

## 2023-02-06 DIAGNOSIS — R10.84 ABDOMINAL PAIN, GENERALIZED: Primary | ICD-10-CM

## 2023-02-06 DIAGNOSIS — F32.9 REACTIVE DEPRESSION: ICD-10-CM

## 2023-02-06 DIAGNOSIS — R63.0 POOR APPETITE: ICD-10-CM

## 2023-02-06 DIAGNOSIS — T45.1X5A CHEMOTHERAPY INDUCED NEUTROPENIA (HCC): Primary | ICD-10-CM

## 2023-02-06 DIAGNOSIS — C18.9 COLON ADENOCARCINOMA (HCC): ICD-10-CM

## 2023-02-06 DIAGNOSIS — T45.1X5A CHEMOTHERAPY INDUCED NEUTROPENIA (HCC): ICD-10-CM

## 2023-02-06 DIAGNOSIS — R53.83 OTHER FATIGUE: ICD-10-CM

## 2023-02-06 DIAGNOSIS — R10.11 RUQ ABDOMINAL PAIN: ICD-10-CM

## 2023-02-06 DIAGNOSIS — R11.0 NAUSEA WITHOUT VOMITING: ICD-10-CM

## 2023-02-06 DIAGNOSIS — C18.4 CARCINOMA OF TRANSVERSE COLON (HCC): Primary | ICD-10-CM

## 2023-02-06 DIAGNOSIS — Z76.89 PREVENTION OF CHEMOTHERAPY-INDUCED NEUTROPENIA: ICD-10-CM

## 2023-02-06 DIAGNOSIS — F41.9 ANXIETY: ICD-10-CM

## 2023-02-06 DIAGNOSIS — M54.50 CHRONIC LEFT-SIDED LOW BACK PAIN WITHOUT SCIATICA: ICD-10-CM

## 2023-02-06 DIAGNOSIS — R11.2 NAUSEA AND VOMITING, UNSPECIFIED VOMITING TYPE: ICD-10-CM

## 2023-02-06 DIAGNOSIS — D69.6 THROMBOCYTOPENIA (HCC): ICD-10-CM

## 2023-02-06 DIAGNOSIS — K52.1 DIARRHEA DUE TO DRUG: ICD-10-CM

## 2023-02-06 DIAGNOSIS — D70.1 CHEMOTHERAPY INDUCED NEUTROPENIA (HCC): Primary | ICD-10-CM

## 2023-02-06 DIAGNOSIS — G89.29 CHRONIC LEFT-SIDED LOW BACK PAIN WITHOUT SCIATICA: ICD-10-CM

## 2023-02-06 DIAGNOSIS — D70.1 CHEMOTHERAPY INDUCED NEUTROPENIA (HCC): ICD-10-CM

## 2023-02-06 DIAGNOSIS — M79.2 NEUROPATHIC PAIN: ICD-10-CM

## 2023-02-06 LAB
ALBUMIN SERPL-MCNC: 2.8 G/DL (ref 3.5–5)
ALBUMIN/GLOB SERPL: 0.7 (ref 1.1–2.2)
ALP SERPL-CCNC: 296 U/L (ref 45–117)
ALT SERPL-CCNC: 13 U/L (ref 12–78)
ANION GAP SERPL CALC-SCNC: 11 MMOL/L (ref 5–15)
AST SERPL-CCNC: 17 U/L (ref 15–37)
BASOPHILS # BLD: 0 K/UL (ref 0–0.1)
BASOPHILS NFR BLD: 1 % (ref 0–1)
BILIRUB SERPL-MCNC: 0.7 MG/DL (ref 0.2–1)
BUN SERPL-MCNC: 10 MG/DL (ref 6–20)
BUN/CREAT SERPL: 9 (ref 12–20)
CALCIUM SERPL-MCNC: 9 MG/DL (ref 8.5–10.1)
CHLORIDE SERPL-SCNC: 96 MMOL/L (ref 97–108)
CO2 SERPL-SCNC: 27 MMOL/L (ref 21–32)
CREAT SERPL-MCNC: 1.08 MG/DL (ref 0.7–1.3)
DIFFERENTIAL METHOD BLD: ABNORMAL
EOSINOPHIL # BLD: 0.1 K/UL (ref 0–0.4)
EOSINOPHIL NFR BLD: 4 % (ref 0–7)
ERYTHROCYTE [DISTWIDTH] IN BLOOD BY AUTOMATED COUNT: 14.8 % (ref 11.5–14.5)
GLOBULIN SER CALC-MCNC: 4 G/DL (ref 2–4)
GLUCOSE SERPL-MCNC: 156 MG/DL (ref 65–100)
HCT VFR BLD AUTO: 33.3 % (ref 36.6–50.3)
HGB BLD-MCNC: 11 G/DL (ref 12.1–17)
IMM GRANULOCYTES # BLD AUTO: 0 K/UL (ref 0–0.04)
IMM GRANULOCYTES NFR BLD AUTO: 0 % (ref 0–0.5)
LYMPHOCYTES # BLD: 0.8 K/UL (ref 0.8–3.5)
LYMPHOCYTES NFR BLD: 36 % (ref 12–49)
MCH RBC QN AUTO: 31.7 PG (ref 26–34)
MCHC RBC AUTO-ENTMCNC: 33 G/DL (ref 30–36.5)
MCV RBC AUTO: 96 FL (ref 80–99)
MONOCYTES # BLD: 0.6 K/UL (ref 0–1)
MONOCYTES NFR BLD: 26 % (ref 5–13)
NEUTS SEG # BLD: 0.8 K/UL (ref 1.8–8)
NEUTS SEG NFR BLD: 33 % (ref 32–75)
NRBC # BLD: 0 K/UL (ref 0–0.01)
NRBC BLD-RTO: 0 PER 100 WBC
PLATELET # BLD AUTO: 147 K/UL (ref 150–400)
PMV BLD AUTO: 11 FL (ref 8.9–12.9)
POTASSIUM SERPL-SCNC: 3.5 MMOL/L (ref 3.5–5.1)
PROT SERPL-MCNC: 6.8 G/DL (ref 6.4–8.2)
RBC # BLD AUTO: 3.47 M/UL (ref 4.1–5.7)
RBC MORPH BLD: ABNORMAL
SODIUM SERPL-SCNC: 134 MMOL/L (ref 136–145)
WBC # BLD AUTO: 2.3 K/UL (ref 4.1–11.1)

## 2023-02-06 PROCEDURE — 80053 COMPREHEN METABOLIC PANEL: CPT

## 2023-02-06 PROCEDURE — 77030016057 HC NDL HUBR APOL -B

## 2023-02-06 PROCEDURE — 99214 OFFICE O/P EST MOD 30 MIN: CPT | Performed by: INTERNAL MEDICINE

## 2023-02-06 PROCEDURE — 74011250636 HC RX REV CODE- 250/636: Performed by: NURSE PRACTITIONER

## 2023-02-06 PROCEDURE — 3074F SYST BP LT 130 MM HG: CPT | Performed by: INTERNAL MEDICINE

## 2023-02-06 PROCEDURE — 3079F DIAST BP 80-89 MM HG: CPT | Performed by: INTERNAL MEDICINE

## 2023-02-06 PROCEDURE — 96360 HYDRATION IV INFUSION INIT: CPT

## 2023-02-06 PROCEDURE — 85025 COMPLETE CBC W/AUTO DIFF WBC: CPT

## 2023-02-06 PROCEDURE — 36415 COLL VENOUS BLD VENIPUNCTURE: CPT

## 2023-02-06 RX ORDER — EPINEPHRINE 1 MG/ML
0.3 INJECTION, SOLUTION, CONCENTRATE INTRAVENOUS AS NEEDED
OUTPATIENT
Start: 2023-02-13

## 2023-02-06 RX ORDER — ATROPINE SULFATE 0.4 MG/ML
0.4 INJECTION, SOLUTION ENDOTRACHEAL; INTRAMEDULLARY; INTRAMUSCULAR; INTRAVENOUS; SUBCUTANEOUS
OUTPATIENT
Start: 2023-02-13

## 2023-02-06 RX ORDER — OXYCODONE HCL 20 MG/1
20 TABLET, FILM COATED, EXTENDED RELEASE ORAL EVERY 8 HOURS
Qty: 90 TABLET | Refills: 0 | Status: ON HOLD | OUTPATIENT
Start: 2023-02-06 | End: 2023-03-08

## 2023-02-06 RX ORDER — SODIUM CHLORIDE 9 MG/ML
1000 INJECTION, SOLUTION INTRAVENOUS ONCE
Start: 2023-02-15 | End: 2023-02-15

## 2023-02-06 RX ORDER — SODIUM CHLORIDE 9 MG/ML
1000 INJECTION, SOLUTION INTRAVENOUS ONCE
Status: COMPLETED | OUTPATIENT
Start: 2023-02-06 | End: 2023-02-06

## 2023-02-06 RX ORDER — PALONOSETRON 0.05 MG/ML
0.25 INJECTION, SOLUTION INTRAVENOUS ONCE
OUTPATIENT
Start: 2023-02-13 | End: 2023-02-13

## 2023-02-06 RX ORDER — FLUOROURACIL 50 MG/ML
400 INJECTION, SOLUTION INTRAVENOUS ONCE
OUTPATIENT
Start: 2023-02-13 | End: 2023-02-13

## 2023-02-06 RX ORDER — SODIUM CHLORIDE 0.9 % (FLUSH) 0.9 %
5-40 SYRINGE (ML) INJECTION AS NEEDED
OUTPATIENT
Start: 2023-02-13

## 2023-02-06 RX ORDER — DIPHENHYDRAMINE HYDROCHLORIDE 50 MG/ML
25 INJECTION, SOLUTION INTRAMUSCULAR; INTRAVENOUS AS NEEDED
Start: 2023-02-13

## 2023-02-06 RX ORDER — ONDANSETRON 2 MG/ML
8 INJECTION INTRAMUSCULAR; INTRAVENOUS AS NEEDED
OUTPATIENT
Start: 2023-02-13

## 2023-02-06 RX ORDER — ALBUTEROL SULFATE 0.83 MG/ML
2.5 SOLUTION RESPIRATORY (INHALATION) AS NEEDED
Start: 2023-02-13

## 2023-02-06 RX ORDER — DIPHENHYDRAMINE HYDROCHLORIDE 50 MG/ML
50 INJECTION, SOLUTION INTRAMUSCULAR; INTRAVENOUS AS NEEDED
Start: 2023-02-13

## 2023-02-06 RX ORDER — SODIUM CHLORIDE 9 MG/ML
5-40 INJECTION INTRAVENOUS AS NEEDED
OUTPATIENT
Start: 2023-02-13

## 2023-02-06 RX ORDER — HEPARIN 100 UNIT/ML
500 SYRINGE INTRAVENOUS AS NEEDED
Start: 2023-02-13

## 2023-02-06 RX ORDER — SODIUM CHLORIDE 9 MG/ML
5-250 INJECTION, SOLUTION INTRAVENOUS AS NEEDED
OUTPATIENT
Start: 2023-02-13

## 2023-02-06 RX ORDER — DEXTROSE MONOHYDRATE 50 MG/ML
5-250 INJECTION, SOLUTION INTRAVENOUS AS NEEDED
OUTPATIENT
Start: 2023-02-13

## 2023-02-06 RX ORDER — LOPERAMIDE HCL 2 MG
2 TABLET ORAL
Qty: 30 TABLET | Refills: 0 | Status: ON HOLD | OUTPATIENT
Start: 2023-02-06

## 2023-02-06 RX ORDER — ACETAMINOPHEN 325 MG/1
650 TABLET ORAL AS NEEDED
Start: 2023-02-13

## 2023-02-06 RX ORDER — HYDROCORTISONE SODIUM SUCCINATE 100 MG/2ML
100 INJECTION, POWDER, FOR SOLUTION INTRAMUSCULAR; INTRAVENOUS AS NEEDED
OUTPATIENT
Start: 2023-02-13

## 2023-02-06 RX ADMIN — SODIUM CHLORIDE 1000 ML: 9 INJECTION, SOLUTION INTRAVENOUS at 09:52

## 2023-02-06 NOTE — PROGRESS NOTES
1. Have you been to the ER, urgent care clinic since your last visit? Hospitalized since your last visit? No    2. Have you seen or consulted any other health care providers outside of the [address] [place] [address] since your last visit? Include any pap smears or colon screening.  Yes Radiation at [address] [place] [address]  /86 (BP 1 Location: Right upper arm, BP Patient Position: Sitting, BP Cuff Size: Adult)   Pulse (!) 113   Temp 98 °F (36.7 °C)   Resp 18   Ht 5' 7\" (1.702 m)   Wt 130 lb 8 oz (59.2 kg)   SpO2 99%   BMI 20.44 kg/m²            Chief Complaint   Patient presents with    Follow-up    Colon Cancer

## 2023-02-06 NOTE — PROGRESS NOTES
[facility] VISIT NOTE  Date: 2023    Name: Riya Gan. MRN: [id number]         : 1975    Mr. Sharath Coffey Arrived ambulatory and in no distress for C3D1 of Folfiri + Vectabix Regimen (HELD). Assessment was completed , patient is reporting 9/10 abdominal pain. Right chest wall port accessed by the  without difficulty, labs drawn & sent for processing. Chemotherapy Flowsheet 2023   Cycle C3D1   Date 2023   Drug / Regimen Folfiri+Vectibix   Pre Hydration given   Post Hydration -   Pre Meds -   Notes HELD           Mr. Douglas Mitchell vitals were reviewed. Patient Vitals for the past 12 hrs:   Temp Pulse Resp BP SpO2   23 1102 -- 99 -- 120/81 --   23 0739 98 °F (36.7 °C) (!) 113 16 (!) 136/94 99 %         Lab results were obtained and reviewed. Recent Results (from the past 12 hour(s))   CBC WITH AUTOMATED DIFF    Collection Time: 23  7:52 AM   Result Value Ref Range    WBC 2.3 (L) 4.1 - 11.1 K/uL    RBC 3.47 (L) 4.10 - 5.70 M/uL    HGB 11.0 (L) 12.1 - 17.0 g/dL    HCT 33.3 (L) 36.6 - 50.3 %    MCV 96.0 80.0 - 99.0 FL    MCH 31.7 26.0 - 34.0 PG    MCHC 33.0 30.0 - 36.5 g/dL    RDW 14.8 (H) 11.5 - 14.5 %    PLATELET 630 (L) 583 - 400 K/uL    MPV 11.0 8.9 - 12.9 FL    NRBC 0.0 0  WBC    ABSOLUTE NRBC 0.00 0.00 - 0.01 K/uL    NEUTROPHILS 33 32 - 75 %    LYMPHOCYTES 36 12 - 49 %    MONOCYTES 26 (H) 5 - 13 %    EOSINOPHILS 4 0 - 7 %    BASOPHILS 1 0 - 1 %    IMMATURE GRANULOCYTES 0 0.0 - 0.5 %    ABS. NEUTROPHILS 0.8 (L) 1.8 - 8.0 K/UL    ABS. LYMPHOCYTES 0.8 0.8 - 3.5 K/UL    ABS. MONOCYTES 0.6 0.0 - 1.0 K/UL    ABS. EOSINOPHILS 0.1 0.0 - 0.4 K/UL    ABS. BASOPHILS 0.0 0.0 - 0.1 K/UL    ABS. IMM.  GRANS. 0.0 0.00 - 0.04 K/UL    DF SMEAR SCANNED      RBC COMMENTS MACROCYTOSIS  1+       METABOLIC PANEL, COMPREHENSIVE    Collection Time: 23  7:52 AM   Result Value Ref Range    Sodium 134 (L) 136 - 145 mmol/L    Potassium 3.5 3.5 - 5.1 mmol/L    Chloride 96 (L) 97 - 108 mmol/L    CO2 27 21 - 32 mmol/L    Anion gap 11 5 - 15 mmol/L    Glucose 156 (H) 65 - 100 mg/dL    BUN 10 6 - 20 MG/DL    Creatinine 1.08 0.70 - 1.30 MG/DL    BUN/Creatinine ratio 9 (L) 12 - 20      eGFR >60 >60 ml/min/1.73m2    Calcium 9.0 8.5 - 10.1 MG/DL    Bilirubin, total 0.7 0.2 - 1.0 MG/DL    ALT (SGPT) 13 12 - 78 U/L    AST (SGOT) 17 15 - 37 U/L    Alk. phosphatase 296 (H) 45 - 117 U/L    Protein, total 6.8 6.4 - 8.2 g/dL    Albumin 2.8 (L) 3.5 - 5.0 g/dL    Globulin 4.0 2.0 - 4.0 g/dL    A-G Ratio 0.7 (L) 1.1 - 2.2           Treatment held due to [address] = 0.8. Patient educated on reasons for holding treatment. RN notified pharmacist of held treatment. Mr. Willie Sherwood was discharged from [place] [address] in stable condition at 1105. Port de-accessed, flushed & heparinized per protocol. He is aware of his next appointment.     [facility]  February 6, 2023    Future Appointments:  Future Appointments   Date Time Provider Dahlia Supriya   2/13/2023  7:30 AM [facility] [place]   2/13/2023  8:45 AM Justice Arguello MD [facility] BS AMB   2/15/2023  2:30 [facility]   2/27/2023  7:30 AM [facility]   3/1/2023  2:00 PM [facility]   7/12/2023  3:00 PM Shon Amador MD CAV[facility] BS AMB

## 2023-02-06 NOTE — PROGRESS NOTES
Palliative Medicine Office Visit  Palliative Medicine Nurse Check In  [phone]    Patient Name: Rell Dick YOB: 1975      Date of Office Visit: 2/6/2023    Patient states: \"  \"    From Check In Sheet (scanned in Media):  Has a medical provider talked with you about cardiopulmonary resuscitation (CPR)? [x] Yes   [] No   [] Unable to obtain    Nurse reminder to complete or update ACP FlowSheet:    Is ACP on the Problem List?    [] Yes    [x] No  IF ACP Document is ON FILE; Nurse to place ACP on Problem List     Is there an ACP Note in Chart Review/Note? [x] Yes    [] No   If NO: ALERT PROVIDER       Primary Decision Maker: Steven Bermeo - [phone]  Advance Care Planning 2/6/2023   Patient's Healthcare Decision Maker is: Legal Next of Kin   Confirm Advance Directive None   Patient Would Like to Complete Advance Directive -   Does the patient have other document types -          Is there anything that we should know about you as a person in order to provide you the best care possible? Have you been to the ER, urgent care clinic since your last visit? [] Yes   [x] No   [] Unable to obtain    Have you been hospitalized since your last visit? [] Yes   [x] No   [] Unable to obtain    Have you seen or consulted any other health care providers outside of the [address] [place] [address] since your last visit? [x] Yes   [] No   [] Unable to obtain    Radiation Oncologist at [facility] of name)    Functional status (describe):       Last BM: Colostomy bag      accessed (date):      Bottle review (for opioid pain medication):  Medication 1:   Date filled:   Directions:   # filled:   # left:   # pills taking per day:  Last dose taken:    Medication 2:   Date filled:   Directions:   # filled:   # left:   # pills taking per day:  Last dose taken:    Medication 3:   Date filled:   Directions:   # filled:   # left:   # pills taking per day:  Last dose taken:    Medication 4:   Date filled:   Directions:   # filled:   # left:   # pills taking per day:  Last dose taken:

## 2023-02-06 NOTE — PATIENT INSTRUCTIONS
Dear Makenzie Luis. ,    It was a pleasure seeing you today for an office visit. We will see you again in 1 week for an office visit to coordinate with your infusion. If labs or imaging tests have been ordered for you today, please call the office  at [phone] 48 hours after completion to obtain the results. Your described symptoms were: Fatigue: 8 Drowsiness: 8   Depression: 6 Pain: 7   Anxiety: 9 Nausea: 5   Anorexia: 10 Dyspnea: 0   Best Well-Bein Constipation: No   Other Problem (Comment): 0       This is the plan we talked about:    1. Abdominal pain/neuropathic pain  -Increase extended-release oxycodone to 20-mg every 8 hours  -Continue oxycodone 10-mg: take 2 tabs every 4 hours as needed  -You've started Radiation Therapy at [facility] and anticipate receiving 15 treatments  -Continue gabapentin 300-mg: take 1 cap at bedtime     2. Low back pain  -This has been chronic since lymphoma diagnosis several years ago, unchanged  -Continue oxycodone as above    3. Depression/anxiety  -You have chronic anxiety which has increased with the news of your cancer progression and your increased pain  -You've met with our clinical , Enzo Wilson, in the past  -Continue Lexapro 20-mg daily  -I'm avoiding benzodiazepines due to synergistic effect with opioids    4. Poor appetite/weight loss/dehydration  -Your appetite is poor   -Ray Colon, the [facility] dietician, reached out to you recently  -Today we discussed drinking Pedialyte or Gatorade for hydration    6. Fatigue  -This is chronic and unchanged   -You continue to sleep about 5 hours at night    7. Nausea  -Continue ondansetron 8-mg every 8 hours as needed  -Continue prochlorperazine 10-mg every 6 hours as needed    8.  Colon mass  -You're receiving chemotherapy under the care of Dr. Jeremiah Jay      This is what you have shared with us about Advance Care Planning:      Primary Decision Maker: Macie Main - Girlfriend - [phone]  Advance Care Planning 2/6/2023   Patient's Healthcare Decision Maker is: Legal Next of Kin   Confirm Advance Directive None   Patient Would Like to Complete Advance Directive -   Does the patient have other document types -           The Palliative Medicine Team is here to support you and your family.        Sincerely,      Johan Chaidez MD and the Palliative Medicine Te

## 2023-02-08 ENCOUNTER — HOSPITAL ENCOUNTER (OUTPATIENT)
Dept: INFUSION THERAPY | Age: 48
End: 2023-02-08

## 2023-02-09 ENCOUNTER — APPOINTMENT (OUTPATIENT)
Dept: CT IMAGING | Age: 48
DRG: 240 | End: 2023-02-09
Attending: EMERGENCY MEDICINE
Payer: MEDICAID

## 2023-02-09 ENCOUNTER — HOSPITAL ENCOUNTER (EMERGENCY)
Age: 48
Discharge: LEFT BEFORE DISCHARGE | DRG: 240 | End: 2023-02-09
Attending: EMERGENCY MEDICINE
Payer: MEDICAID

## 2023-02-09 ENCOUNTER — TELEPHONE (OUTPATIENT)
Dept: ONCOLOGY | Age: 48
End: 2023-02-09

## 2023-02-09 ENCOUNTER — HOSPITAL ENCOUNTER (OUTPATIENT)
Dept: INFUSION THERAPY | Age: 48
Discharge: HOME | End: 2023-02-09
Payer: MEDICAID

## 2023-02-09 VITALS
SYSTOLIC BLOOD PRESSURE: 122 MMHG | DIASTOLIC BLOOD PRESSURE: 98 MMHG | HEART RATE: 101 BPM | OXYGEN SATURATION: 96 % | WEIGHT: 119.6 LBS | TEMPERATURE: 98.6 F | BODY MASS INDEX: 18.73 KG/M2 | RESPIRATION RATE: 18 BRPM

## 2023-02-09 VITALS
HEIGHT: 67 IN | OXYGEN SATURATION: 96 % | BODY MASS INDEX: 18.68 KG/M2 | DIASTOLIC BLOOD PRESSURE: 98 MMHG | HEART RATE: 99 BPM | SYSTOLIC BLOOD PRESSURE: 120 MMHG | RESPIRATION RATE: 18 BRPM | TEMPERATURE: 98.4 F | WEIGHT: 119 LBS

## 2023-02-09 DIAGNOSIS — C18.9 COLON ADENOCARCINOMA (HCC): Primary | ICD-10-CM

## 2023-02-09 DIAGNOSIS — E87.1 HYPONATREMIA: ICD-10-CM

## 2023-02-09 DIAGNOSIS — K56.609 SMALL BOWEL OBSTRUCTION (HCC): ICD-10-CM

## 2023-02-09 DIAGNOSIS — N17.9 AKI (ACUTE KIDNEY INJURY) (HCC): ICD-10-CM

## 2023-02-09 DIAGNOSIS — C18.9 MALIGNANT NEOPLASM OF COLON, UNSPECIFIED PART OF COLON (HCC): Primary | ICD-10-CM

## 2023-02-09 DIAGNOSIS — C82.90 FOLLICULAR LYMPHOMA, UNSPECIFIED FOLLICULAR LYMPHOMA TYPE, UNSPECIFIED BODY REGION (HCC): ICD-10-CM

## 2023-02-09 LAB
ANION GAP SERPL CALC-SCNC: 11 MMOL/L (ref 5–15)
BASOPHILS # BLD: 0 K/UL (ref 0–0.1)
BASOPHILS NFR BLD: 0 % (ref 0–1)
BUN SERPL-MCNC: 37 MG/DL (ref 6–20)
BUN/CREAT SERPL: 14 (ref 12–20)
CALCIUM SERPL-MCNC: 10.5 MG/DL (ref 8.5–10.1)
CHLORIDE SERPL-SCNC: 87 MMOL/L (ref 97–108)
CO2 SERPL-SCNC: 29 MMOL/L (ref 21–32)
CREAT SERPL-MCNC: 2.56 MG/DL (ref 0.7–1.3)
DIFFERENTIAL METHOD BLD: ABNORMAL
EOSINOPHIL # BLD: 0 K/UL (ref 0–0.4)
EOSINOPHIL NFR BLD: 0 % (ref 0–7)
ERYTHROCYTE [DISTWIDTH] IN BLOOD BY AUTOMATED COUNT: 15 % (ref 11.5–14.5)
GLUCOSE SERPL-MCNC: 153 MG/DL (ref 65–100)
HCT VFR BLD AUTO: 36.1 % (ref 36.6–50.3)
HGB BLD-MCNC: 12.2 G/DL (ref 12.1–17)
IMM GRANULOCYTES # BLD AUTO: 0 K/UL
IMM GRANULOCYTES NFR BLD AUTO: 0 %
LIPASE SERPL-CCNC: 49 U/L (ref 73–393)
LYMPHOCYTES # BLD: 0.6 K/UL (ref 0.8–3.5)
LYMPHOCYTES NFR BLD: 9 % (ref 12–49)
MCH RBC QN AUTO: 31.4 PG (ref 26–34)
MCHC RBC AUTO-ENTMCNC: 33.8 G/DL (ref 30–36.5)
MCV RBC AUTO: 92.8 FL (ref 80–99)
MONOCYTES # BLD: 0.8 K/UL (ref 0–1)
MONOCYTES NFR BLD: 11 % (ref 5–13)
MYELOCYTES NFR BLD MANUAL: 2 %
NEUTS BAND NFR BLD MANUAL: 7 % (ref 0–6)
NEUTS SEG # BLD: 5.5 K/UL (ref 1.8–8)
NEUTS SEG NFR BLD: 71 % (ref 32–75)
NRBC # BLD: 0 K/UL (ref 0–0.01)
NRBC BLD-RTO: 0 PER 100 WBC
PLATELET # BLD AUTO: 242 K/UL (ref 150–400)
PMV BLD AUTO: 10.4 FL (ref 8.9–12.9)
POTASSIUM SERPL-SCNC: 3.4 MMOL/L (ref 3.5–5.1)
RBC # BLD AUTO: 3.89 M/UL (ref 4.1–5.7)
RBC MORPH BLD: ABNORMAL
SODIUM SERPL-SCNC: 127 MMOL/L (ref 136–145)
WBC # BLD AUTO: 7.1 K/UL (ref 4.1–11.1)

## 2023-02-09 PROCEDURE — 77030016057 HC NDL HUBR APOL -B

## 2023-02-09 PROCEDURE — 74011250636 HC RX REV CODE- 250/636: Performed by: NURSE PRACTITIONER

## 2023-02-09 PROCEDURE — 96374 THER/PROPH/DIAG INJ IV PUSH: CPT

## 2023-02-09 PROCEDURE — 83690 ASSAY OF LIPASE: CPT

## 2023-02-09 PROCEDURE — 96361 HYDRATE IV INFUSION ADD-ON: CPT

## 2023-02-09 PROCEDURE — 96360 HYDRATION IV INFUSION INIT: CPT

## 2023-02-09 PROCEDURE — 36415 COLL VENOUS BLD VENIPUNCTURE: CPT

## 2023-02-09 PROCEDURE — 80048 BASIC METABOLIC PNL TOTAL CA: CPT

## 2023-02-09 PROCEDURE — 85025 COMPLETE CBC W/AUTO DIFF WBC: CPT

## 2023-02-09 PROCEDURE — 96375 TX/PRO/DX INJ NEW DRUG ADDON: CPT

## 2023-02-09 PROCEDURE — 99284 EMERGENCY DEPT VISIT MOD MDM: CPT

## 2023-02-09 PROCEDURE — 74011250636 HC RX REV CODE- 250/636: Performed by: EMERGENCY MEDICINE

## 2023-02-09 PROCEDURE — 74176 CT ABD & PELVIS W/O CONTRAST: CPT

## 2023-02-09 PROCEDURE — 74011000250 HC RX REV CODE- 250: Performed by: NURSE PRACTITIONER

## 2023-02-09 RX ORDER — HEPARIN 100 UNIT/ML
500 SYRINGE INTRAVENOUS AS NEEDED
Status: DISCONTINUED | OUTPATIENT
Start: 2023-02-09 | End: 2023-02-10 | Stop reason: HOSPADM

## 2023-02-09 RX ORDER — HEPARIN 100 UNIT/ML
300 SYRINGE INTRAVENOUS
Status: DISCONTINUED | OUTPATIENT
Start: 2023-02-09 | End: 2023-02-09

## 2023-02-09 RX ORDER — DEXAMETHASONE SODIUM PHOSPHATE 4 MG/ML
4 INJECTION, SOLUTION INTRA-ARTICULAR; INTRALESIONAL; INTRAMUSCULAR; INTRAVENOUS; SOFT TISSUE ONCE
Status: CANCELLED
Start: 2023-02-09 | End: 2023-02-09

## 2023-02-09 RX ORDER — SODIUM CHLORIDE 9 MG/ML
5-250 INJECTION, SOLUTION INTRAVENOUS AS NEEDED
Status: CANCELLED | OUTPATIENT
Start: 2023-02-09

## 2023-02-09 RX ORDER — SODIUM CHLORIDE 9 MG/ML
5-40 INJECTION INTRAVENOUS AS NEEDED
Status: CANCELLED | OUTPATIENT
Start: 2023-02-09

## 2023-02-09 RX ORDER — SODIUM CHLORIDE 9 MG/ML
5-250 INJECTION, SOLUTION INTRAVENOUS AS NEEDED
Status: DISCONTINUED | OUTPATIENT
Start: 2023-02-09 | End: 2023-02-10 | Stop reason: HOSPADM

## 2023-02-09 RX ORDER — HEPARIN 100 UNIT/ML
500 SYRINGE INTRAVENOUS AS NEEDED
Status: CANCELLED
Start: 2023-02-09

## 2023-02-09 RX ORDER — ONDANSETRON 2 MG/ML
4 INJECTION INTRAMUSCULAR; INTRAVENOUS ONCE
Status: COMPLETED | OUTPATIENT
Start: 2023-02-09 | End: 2023-02-09

## 2023-02-09 RX ORDER — SODIUM CHLORIDE 0.9 % (FLUSH) 0.9 %
5-40 SYRINGE (ML) INJECTION AS NEEDED
Status: CANCELLED | OUTPATIENT
Start: 2023-02-09

## 2023-02-09 RX ORDER — HYDROMORPHONE HYDROCHLORIDE 1 MG/ML
1 INJECTION, SOLUTION INTRAMUSCULAR; INTRAVENOUS; SUBCUTANEOUS ONCE
Status: COMPLETED | OUTPATIENT
Start: 2023-02-09 | End: 2023-02-09

## 2023-02-09 RX ORDER — SODIUM CHLORIDE 9 MG/ML
5-40 INJECTION INTRAVENOUS AS NEEDED
Status: DISCONTINUED | OUTPATIENT
Start: 2023-02-09 | End: 2023-02-10 | Stop reason: HOSPADM

## 2023-02-09 RX ORDER — SODIUM CHLORIDE 0.9 % (FLUSH) 0.9 %
5-40 SYRINGE (ML) INJECTION AS NEEDED
Status: DISCONTINUED | OUTPATIENT
Start: 2023-02-09 | End: 2023-02-10 | Stop reason: HOSPADM

## 2023-02-09 RX ORDER — DEXAMETHASONE SODIUM PHOSPHATE 4 MG/ML
4 INJECTION, SOLUTION INTRA-ARTICULAR; INTRALESIONAL; INTRAMUSCULAR; INTRAVENOUS; SOFT TISSUE ONCE
Status: COMPLETED | OUTPATIENT
Start: 2023-02-09 | End: 2023-02-09

## 2023-02-09 RX ADMIN — Medication 10 ML: at 15:20

## 2023-02-09 RX ADMIN — Medication 10 ML: at 16:35

## 2023-02-09 RX ADMIN — ONDANSETRON 4 MG: 2 INJECTION INTRAMUSCULAR; INTRAVENOUS at 18:16

## 2023-02-09 RX ADMIN — HYDROMORPHONE HYDROCHLORIDE 1 MG: 1 INJECTION, SOLUTION INTRAMUSCULAR; INTRAVENOUS; SUBCUTANEOUS at 18:16

## 2023-02-09 RX ADMIN — DEXAMETHASONE SODIUM PHOSPHATE 4 MG: 4 INJECTION INTRA-ARTICULAR; INTRALESIONAL; INTRAMUSCULAR; INTRAVENOUS; SOFT TISSUE at 15:32

## 2023-02-09 RX ADMIN — SODIUM CHLORIDE 1000 ML: 9 INJECTION, SOLUTION INTRAVENOUS at 15:28

## 2023-02-09 NOTE — PROGRESS NOTES
[facility] Progress Note    Date: 2023    Name: Elham Kincaid. MRN: [id number]         : 1975    Mr. Leslie Kerr Arrived ambulatory and in no distress for Hydration. Assessment was completed, Patient is having increased nausea/ vomiting and fatigue. Right chest wall port accessed without difficulty, labs drawn & sent for processing. Patient had a 10Lb weight loss in 3 days. MD office notified of this change. Bradly Mascorro NP came down to bedside to see patient. Patient and NP discussed further evaluation at the hospital  due to possible obstruction. Patient agreeable. Mr. Lowell Herrera vitals were reviewed. Visit Vitals  BP (!) 122/98 (BP 1 Location: Left arm, BP Patient Position: Sitting)   Pulse (!) 101   Temp 98.6 °F (37 °C)   Resp 18   Wt 54.3 kg (119 lb 9.6 oz)   SpO2 96%   BMI 18.73 kg/m²       Lab results were obtained and reviewed.   Recent Results (from the past 12 hour(s))   METABOLIC PANEL, BASIC    Collection Time: 23  3:29 PM   Result Value Ref Range    Sodium 127 (L) 136 - 145 mmol/L    Potassium 3.4 (L) 3.5 - 5.1 mmol/L    Chloride 87 (L) 97 - 108 mmol/L    CO2 29 21 - 32 mmol/L    Anion gap 11 5 - 15 mmol/L    Glucose 153 (H) 65 - 100 mg/dL    BUN 37 (H) 6 - 20 MG/DL    Creatinine 2.56 (H) 0.70 - 1.30 MG/DL    BUN/Creatinine ratio 14 12 - 20      eGFR 31 (L) >60 ml/min/1.73m2    Calcium 10.5 (H) 8.5 - 10.1 MG/DL       Medications:  Medications Administered       0.9% sodium chloride injection 5-40 mL       Admin Date  2023 Action  Given Dose  10 mL Route  IntraVENous Administered By  Edgar Flores RN              dexamethasone (DECADRON) 4 mg/mL injection 4 mg       Admin Date  2023 Action  Given Dose  4 mg Route  IntraVENous Administered By  Edgar Flores RN              sodium chloride (NS) flush 5-40 mL       Admin Date  2023 Action  Given Dose  10 mL Route  IntraVENous Administered By  Edgar Flores RN               Admin Date  2023 Action  Given Dose  10 mL Route  IntraVENous Administered By  Denilson Lugo, ADA              sodium chloride 0.9 % bolus infusion 1,000 mL       Admin Date  02/09/2023 Action  New Bag Dose  1,000 mL Rate  1,000 mL/hr Route  IntraVENous Administered By  Denilson Lugo RN                   Mr. Amaury Villegas tolerated treatment well and was discharged from [place] [address] in stable condition at 1645pm.   Port flushed & left accessed for patient to go to ED. He is to return on February 23 2023 at 5am for his next appointment.     Dipti Chavez RN  February 9, 2023

## 2023-02-09 NOTE — ED PROVIDER NOTES
44-year-old male with a history of metastatic colon cancer presents today with nausea and vomiting. He was at the infusion center getting IV fluids when they found him to have intractable nausea and vomiting and pain. They sent him here to the ER for CAT scan for potential bowel obstruction. He has had some Zofran at the infusion center along with 1 L of normal saline. His labs today show evidence of new renal failure with a creatinine of 2.8. He is also noted to be hyponatremic at 127. The history is provided by the patient. Nausea   This is a chronic problem. The current episode started more than 1 week ago. The problem occurs more than 10 times per day. The problem has not changed since onset. There has been no fever. Pertinent negatives include no chills, no fever, no abdominal pain, no diarrhea, no headaches, no myalgias and no headaches. Past medical history comments: Metastatic colon cancer. Abdominal Pain   This is a chronic problem. The current episode started more than 1 week ago. The problem has not changed since onset. The pain is associated with vomiting. The pain is located in the generalized abdominal region. The quality of the pain is aching and dull. The pain is moderate. Associated symptoms include nausea and vomiting. Pertinent negatives include no fever, no diarrhea, no hematochezia, no melena, no constipation, no dysuria, no frequency, no hematuria, no headaches, no myalgias, no chest pain and no back pain. Nothing worsens the pain. The pain is relieved by Nothing. Past workup includes CT scan, surgery. Past medical history comments: Metastatic colon cancer.       Past Medical History:   Diagnosis Date    Anxiety     Anxiety and depression     Cancer [facility])     lymphoma Nov 2018 receiving chemo    Cancer ([facility] [address] 02/2022    COLON    Chronic pain     lower back- lymphoma    Hyperlipidemia     Hypertension     NO MEDICATION FOR PAST 9 MONTHS    Lymphadenopathy 11/12/2018    Seizures ([facility] [address] CHILDHOOD    NEVER TOOK MEDICATION - \"GREW OUT OF THEM\"       Past Surgical History:   Procedure Laterality Date    HX COLONOSCOPY      HX HEART CATHETERIZATION  02/2019    HX ILEOSTOMY      HX OTHER SURGICAL  02/2019    cardiac stent    IR INJECTION PSEUDOANEURYSM  02/26/2019    FL LAPS ABD PRTM&OMENTUM DX W/WO SPEC BR/WA SPX  04/27/2022    Peritoneal biopsy Dr. Yvon Morales  02/2022    COLON RESECTION WITH ILEOSTOMY    FL UNLISTED PROCEDURE CARDIAC SURGERY  2019    STENT X1         Family History:   Problem Relation Age of Onset    Hypertension Father     Diabetes Father     Cancer Father         PROSTATE    Diabetes Mother     No Known Problems Brother     No Known Problems Brother     Anesth Problems Neg Hx        Social History     Socioeconomic History    Marital status:      Spouse name: Not on file    Number of children: 2    Years of education: 11    Highest education level: 11th grade   Occupational History     Comment: resturant manager,   Tobacco Use    Smoking status: Every Day     Packs/day: 0.50     Years: 20.00     Pack years: 10.00     Types: Cigarettes    Smokeless tobacco: Never    Tobacco comments:     \"STOP SMOKING\" PACKET GIVEN TO PATIENT   Vaping Use    Vaping Use: Never used   Substance and Sexual Activity    Alcohol use: No    Drug use: No    Sexual activity: Yes   Other Topics Concern     Service No    Blood Transfusions No    Caffeine Concern Yes    Occupational Exposure No    Hobby Hazards No    Sleep Concern No    Stress Concern No    Weight Concern No    Special Diet No    Back Care No    Exercise No    Bike Helmet No    Seat Belt No    Self-Exams No   Social History Narrative    Not on file     Social Determinants of Health     Financial Resource Strain: Not on file   Food Insecurity: Not on file   Transportation Needs: Not on file   Physical Activity: Not on file   Stress: Not on file   Social Connections: Not on file Intimate Partner Violence: Not on file   Housing Stability: Not on file         ALLERGIES: Patient has no known allergies. Review of Systems   Constitutional:  Negative for chills and fever. HENT:  Negative for congestion, rhinorrhea, sneezing and sore throat. Eyes:  Negative for redness and visual disturbance. Respiratory:  Negative for shortness of breath. Cardiovascular:  Negative for chest pain and leg swelling. Gastrointestinal:  Positive for nausea and vomiting. Negative for abdominal pain, constipation, diarrhea, hematochezia and melena. Genitourinary:  Negative for difficulty urinating, dysuria, frequency and hematuria. Musculoskeletal:  Negative for back pain, myalgias and neck stiffness. Skin:  Negative for rash. Neurological:  Negative for dizziness, syncope, weakness and headaches. Hematological:  Negative for adenopathy. All other systems reviewed and are negative. Vitals:    02/09/23 1745   BP: 121/88   Pulse: (!) 110   Resp: 18   Temp: 98.6 °F (37 °C)   SpO2: 95%   Weight: 54 kg (119 lb)   Height: 5' 7\" (1.702 m)            Physical Exam  Vitals and nursing note reviewed. Constitutional:       Appearance: Normal appearance. He is well-developed. HENT:      Head: Normocephalic and atraumatic. Cardiovascular:      Rate and Rhythm: Normal rate and regular rhythm. Pulses: Normal pulses. Heart sounds: Normal heart sounds. Pulmonary:      Effort: Pulmonary effort is normal. No respiratory distress. Breath sounds: Normal breath sounds. Chest:      Chest wall: No tenderness. Abdominal:      General: Bowel sounds are normal.      Palpations: Abdomen is soft. Tenderness: There is no abdominal tenderness. There is no guarding or rebound. Musculoskeletal:      Cervical back: Full passive range of motion without pain, normal range of motion and neck supple. Skin:     General: Skin is warm and dry. Findings: No erythema or rash. Neurological:      Mental Status: He is alert and oriented to person, place, and time. Psychiatric:         Speech: Speech normal.         Behavior: Behavior normal.         Thought Content: Thought content normal.         Judgment: Judgment normal.        Medical Decision Making  65-year-old male with stage IV colon cancer and severe carcinomatosis presents today with symptoms of likely small bowel obstruction. He is sent by the infusion center. CT confirmed small bowel obstruction and worsening carcinomatosis. General surgery on call Dr. Orlin Velarde recommends NG tube and admit to hospitalist.    Amount and/or Complexity of Data Reviewed  Labs: ordered. Radiology: ordered. Risk  Prescription drug management. Decision regarding hospitalization. Recent Results (from the past 12 hour(s))   METABOLIC PANEL, BASIC    Collection Time: 02/09/23  3:29 PM   Result Value Ref Range    Sodium 127 (L) 136 - 145 mmol/L    Potassium 3.4 (L) 3.5 - 5.1 mmol/L    Chloride 87 (L) 97 - 108 mmol/L    CO2 29 21 - 32 mmol/L    Anion gap 11 5 - 15 mmol/L    Glucose 153 (H) 65 - 100 mg/dL    BUN 37 (H) 6 - 20 MG/DL    Creatinine 2.56 (H) 0.70 - 1.30 MG/DL    BUN/Creatinine ratio 14 12 - 20      eGFR 31 (L) >60 ml/min/1.73m2    Calcium 10.5 (H) 8.5 - 10.1 MG/DL   CBC WITH AUTOMATED DIFF    Collection Time: 02/09/23  6:16 PM   Result Value Ref Range    WBC 7.1 4.1 - 11.1 K/uL    RBC 3.89 (L) 4.10 - 5.70 M/uL    HGB 12.2 12.1 - 17.0 g/dL    HCT 36.1 (L) 36.6 - 50.3 %    MCV 92.8 80.0 - 99.0 FL    MCH 31.4 26.0 - 34.0 PG    MCHC 33.8 30.0 - 36.5 g/dL    RDW 15.0 (H) 11.5 - 14.5 %    PLATELET 297 170 - 478 K/uL    MPV 10.4 8.9 - 12.9 FL    NRBC 0.0 0  WBC    ABSOLUTE NRBC 0.00 0.00 - 0.01 K/uL    NEUTROPHILS 71 32 - 75 %    BAND NEUTROPHILS 7 (H) 0 - 6 %    LYMPHOCYTES 9 (L) 12 - 49 %    MONOCYTES 11 5 - 13 %    EOSINOPHILS 0 0 - 7 %    BASOPHILS 0 0 - 1 %    MYELOCYTES 2 (H) 0 %    IMMATURE GRANULOCYTES 0 %    ABS. NEUTROPHILS 5.5 1.8 - 8.0 K/UL    ABS. LYMPHOCYTES 0.6 (L) 0.8 - 3.5 K/UL    ABS. MONOCYTES 0.8 0.0 - 1.0 K/UL    ABS. EOSINOPHILS 0.0 0.0 - 0.4 K/UL    ABS. BASOPHILS 0.0 0.0 - 0.1 K/UL    ABS. IMM. GRANS. 0.0 K/UL    DF MANUAL      RBC COMMENTS ANISOCYTOSIS  1+       LIPASE    Collection Time: 02/09/23  6:16 PM   Result Value Ref Range    Lipase 49 (L) 73 - 393 U/L     CT ABD PELV WO CONT   Final Result   Small bowel obstruction with worsening of carcinomatosis. Perfect Serve Consult for Admission  7:49 PM    ED Room Number: ER06/06  Patient Name and age:  Patricia Garduno. 41 y.o.  male  Working Diagnosis:   1. Malignant neoplasm of colon, unspecified part of colon ([facility] [address]    2. Small bowel obstruction ([facility] [address]    3. Hyponatremia    4. ALMA (acute kidney injury) ([facility] [address]        COVID-19 Suspicion:  no  Sepsis present:  no  Reassessment needed: yes  Code Status:  Full Code  Readmission: yes  Isolation Requirements:  no  Recommended Level of Care:  med/surg  Department: [facility] ED - [phone]  Admitting Provider: Hospitalist    Other: 70-year-old male with stage IV colon cancer and now small bowel obstruction due to worsening carcinomatosis. Also new acute kidney injury. Dr. Judi Bhatia of general surgery recommends admit to hospitalist and NG tube today.   Dr. Omar Singh is the oncologist.          Procedures

## 2023-02-09 NOTE — TELEPHONE ENCOUNTER
02/09/23 4:13 PM Evaluated patient in the infusion center due to RN reports of 11 lb weight-loss. Reports he has been unable to keep any fluids or food down for the past 3 days. He is having vomiting despite taking zofran and compazine around the clock. He was having diarrhea, that required emptying colostomy bag 5-10 times daily, but diarrhea has slowed down since starting on imodium, and now has more solid stool output. I discussed with patient I am worried he may have an obstruction causing intractable nausea/vomiting and I would like him to go to the ER for CT imaging, labs and PO challenge. He has metastatic colon cancer. Progressive disease on 12/2022 imaging. Switched to 2nd line treatment with FOLFIRI + panitumumab. Was due for C3 on 2/6/23, but held due to neutropenia. Called ER provider and provided the information above.

## 2023-02-09 NOTE — ED TRIAGE NOTES
Patient was in the infusion center earlier today receiving treatment for stage 4 colon cancer. He has been having persistent nausea and vomiting and his doctors are concerned for a possible bowel blockage.

## 2023-02-10 ENCOUNTER — HOSPITAL ENCOUNTER (INPATIENT)
Age: 48
LOS: 4 days | Discharge: HOME | DRG: 240 | End: 2023-02-14
Attending: EMERGENCY MEDICINE | Admitting: INTERNAL MEDICINE
Payer: MEDICAID

## 2023-02-10 ENCOUNTER — APPOINTMENT (OUTPATIENT)
Dept: GENERAL RADIOLOGY | Age: 48
DRG: 240 | End: 2023-02-10
Attending: STUDENT IN AN ORGANIZED HEALTH CARE EDUCATION/TRAINING PROGRAM
Payer: MEDICAID

## 2023-02-10 DIAGNOSIS — C18.2 CANCER OF ASCENDING COLON METASTATIC TO INTRA-ABDOMINAL LYMPH NODE (HCC): ICD-10-CM

## 2023-02-10 DIAGNOSIS — C77.2 CANCER OF ASCENDING COLON METASTATIC TO INTRA-ABDOMINAL LYMPH NODE (HCC): ICD-10-CM

## 2023-02-10 DIAGNOSIS — Z51.5 PALLIATIVE CARE ENCOUNTER: ICD-10-CM

## 2023-02-10 DIAGNOSIS — Z71.89 GOALS OF CARE, COUNSELING/DISCUSSION: ICD-10-CM

## 2023-02-10 DIAGNOSIS — K56.609 SBO (SMALL BOWEL OBSTRUCTION) (HCC): Primary | ICD-10-CM

## 2023-02-10 DIAGNOSIS — R10.9 ACUTE ABDOMINAL PAIN: ICD-10-CM

## 2023-02-10 DIAGNOSIS — G89.3 CANCER RELATED PAIN: ICD-10-CM

## 2023-02-10 DIAGNOSIS — N17.9 ACUTE RENAL FAILURE, UNSPECIFIED ACUTE RENAL FAILURE TYPE (HCC): ICD-10-CM

## 2023-02-10 PROCEDURE — 74018 RADEX ABDOMEN 1 VIEW: CPT

## 2023-02-10 PROCEDURE — 99285 EMERGENCY DEPT VISIT HI MDM: CPT

## 2023-02-10 PROCEDURE — 65270000029 HC RM PRIVATE

## 2023-02-10 PROCEDURE — 74011250636 HC RX REV CODE- 250/636: Performed by: INTERNAL MEDICINE

## 2023-02-10 PROCEDURE — 2709999900 HC NON-CHARGEABLE SUPPLY

## 2023-02-10 PROCEDURE — 99223 1ST HOSP IP/OBS HIGH 75: CPT | Performed by: NURSE PRACTITIONER

## 2023-02-10 PROCEDURE — 74011250636 HC RX REV CODE- 250/636: Performed by: STUDENT IN AN ORGANIZED HEALTH CARE EDUCATION/TRAINING PROGRAM

## 2023-02-10 PROCEDURE — 99223 1ST HOSP IP/OBS HIGH 75: CPT | Performed by: INTERNAL MEDICINE

## 2023-02-10 PROCEDURE — 74011000250 HC RX REV CODE- 250: Performed by: INTERNAL MEDICINE

## 2023-02-10 PROCEDURE — 74011250636 HC RX REV CODE- 250/636: Performed by: NURSE PRACTITIONER

## 2023-02-10 RX ORDER — ENOXAPARIN SODIUM 100 MG/ML
30 INJECTION SUBCUTANEOUS DAILY
Status: DISCONTINUED | OUTPATIENT
Start: 2023-02-10 | End: 2023-02-11

## 2023-02-10 RX ORDER — ACETAMINOPHEN 325 MG/1
650 TABLET ORAL
Status: DISCONTINUED | OUTPATIENT
Start: 2023-02-10 | End: 2023-02-14 | Stop reason: HOSPADM

## 2023-02-10 RX ORDER — SODIUM CHLORIDE, SODIUM LACTATE, POTASSIUM CHLORIDE, CALCIUM CHLORIDE 600; 310; 30; 20 MG/100ML; MG/100ML; MG/100ML; MG/100ML
75 INJECTION, SOLUTION INTRAVENOUS CONTINUOUS
Status: DISCONTINUED | OUTPATIENT
Start: 2023-02-10 | End: 2023-02-14 | Stop reason: HOSPADM

## 2023-02-10 RX ORDER — MORPHINE SULFATE 2 MG/ML
2 INJECTION, SOLUTION INTRAMUSCULAR; INTRAVENOUS
Status: DISCONTINUED | OUTPATIENT
Start: 2023-02-10 | End: 2023-02-10

## 2023-02-10 RX ORDER — POLYETHYLENE GLYCOL 3350 17 G/17G
17 POWDER, FOR SOLUTION ORAL DAILY PRN
Status: DISCONTINUED | OUTPATIENT
Start: 2023-02-10 | End: 2023-02-14 | Stop reason: HOSPADM

## 2023-02-10 RX ORDER — ACETAMINOPHEN 650 MG/1
650 SUPPOSITORY RECTAL
Status: DISCONTINUED | OUTPATIENT
Start: 2023-02-10 | End: 2023-02-14 | Stop reason: HOSPADM

## 2023-02-10 RX ORDER — HYDROMORPHONE HYDROCHLORIDE 1 MG/ML
1 INJECTION, SOLUTION INTRAMUSCULAR; INTRAVENOUS; SUBCUTANEOUS
Status: DISCONTINUED | OUTPATIENT
Start: 2023-02-10 | End: 2023-02-13

## 2023-02-10 RX ORDER — SODIUM CHLORIDE 0.9 % (FLUSH) 0.9 %
5-40 SYRINGE (ML) INJECTION AS NEEDED
Status: DISCONTINUED | OUTPATIENT
Start: 2023-02-10 | End: 2023-02-14 | Stop reason: HOSPADM

## 2023-02-10 RX ORDER — SODIUM CHLORIDE 0.9 % (FLUSH) 0.9 %
5-40 SYRINGE (ML) INJECTION EVERY 8 HOURS
Status: DISCONTINUED | OUTPATIENT
Start: 2023-02-10 | End: 2023-02-14 | Stop reason: HOSPADM

## 2023-02-10 RX ORDER — ONDANSETRON 2 MG/ML
4 INJECTION INTRAMUSCULAR; INTRAVENOUS
Status: DISCONTINUED | OUTPATIENT
Start: 2023-02-10 | End: 2023-02-14 | Stop reason: HOSPADM

## 2023-02-10 RX ADMIN — HYDROMORPHONE HYDROCHLORIDE 1 MG: 1 INJECTION, SOLUTION INTRAMUSCULAR; INTRAVENOUS; SUBCUTANEOUS at 13:15

## 2023-02-10 RX ADMIN — SODIUM CHLORIDE, PRESERVATIVE FREE 10 ML: 5 INJECTION INTRAVENOUS at 21:38

## 2023-02-10 RX ADMIN — SODIUM CHLORIDE, POTASSIUM CHLORIDE, SODIUM LACTATE AND CALCIUM CHLORIDE 125 ML/HR: 600; 310; 30; 20 INJECTION, SOLUTION INTRAVENOUS at 05:10

## 2023-02-10 RX ADMIN — HYDROMORPHONE HYDROCHLORIDE 1 MG: 1 INJECTION, SOLUTION INTRAMUSCULAR; INTRAVENOUS; SUBCUTANEOUS at 21:36

## 2023-02-10 RX ADMIN — MORPHINE SULFATE 2 MG: 2 INJECTION, SOLUTION INTRAMUSCULAR; INTRAVENOUS at 04:29

## 2023-02-10 RX ADMIN — ENOXAPARIN SODIUM 30 MG: 100 INJECTION SUBCUTANEOUS at 08:22

## 2023-02-10 RX ADMIN — SODIUM CHLORIDE, POTASSIUM CHLORIDE, SODIUM LACTATE AND CALCIUM CHLORIDE 125 ML/HR: 600; 310; 30; 20 INJECTION, SOLUTION INTRAVENOUS at 21:39

## 2023-02-10 RX ADMIN — SODIUM CHLORIDE, POTASSIUM CHLORIDE, SODIUM LACTATE AND CALCIUM CHLORIDE 125 ML/HR: 600; 310; 30; 20 INJECTION, SOLUTION INTRAVENOUS at 13:14

## 2023-02-10 RX ADMIN — SODIUM CHLORIDE, PRESERVATIVE FREE 10 ML: 5 INJECTION INTRAVENOUS at 05:10

## 2023-02-10 RX ADMIN — ONDANSETRON 4 MG: 2 INJECTION INTRAMUSCULAR; INTRAVENOUS at 04:29

## 2023-02-10 RX ADMIN — SODIUM CHLORIDE, POTASSIUM CHLORIDE, SODIUM LACTATE AND CALCIUM CHLORIDE 1000 ML: 600; 310; 30; 20 INJECTION, SOLUTION INTRAVENOUS at 03:30

## 2023-02-10 NOTE — H&P
Hospitalist Admission Note    NAME:  Mary Ellen Hopper .   :  1975   MRN:  [id number]     Date/Time:  2023 8:34 PM    Patient PCP: Mary Ellen Hopper MD  ________________________________________________________________________    Given the patient's current clinical presentation, I have a high level of concern for decompensation if discharged from the emergency department. Complex decision making was performed, which includes reviewing the patient's available past medical records, laboratory results, and x-ray films. My assessment of this patient's clinical condition and my plan of care is as follows. Assessment / Plan:    SBO:    The patient comes in w/ n/v since Tuesday w/ PMH colon cancer    VSS -   WBC normal, Hg 12.2  Na 127, K 3.4  BUN 37, Cr 2.56 (23 Cr 1.08)  CT Abdomen/Pelvis - small bowel obstruction w/ worsening carcinomatosis    Admit and cont to monitor  NPO, IVF, anti-emetics and pain management  Insert NG tube w/ LWS and re-evaluate symptoms  Consult Gen Surgery    The patient is adamant about not being admitted to the hospital.  He understands the risk/benefits of his decision and has medical capacity. I have notified the ER doctor of the patient's request.    ALMA:    Cont w/ IVF and repeat BMP    Body mass index is 18.64 kg/m².:  less than 18.5:  Underweight    I have personally reviewed the radiographs, laboratory data in Epic and decisions and statements above are based partially on this personal interpretation. Code Status: Full Code    Prophylaxis:  Lovenox SQ     Subjective:   CHIEF COMPLAINT: Nausea/Vomiting    HISTORY OF PRESENT ILLNESS:       The patient marleen  40 y/o C M w/ PMH follicular lymphoma s/p chemotherapy currently undera ctive surveillance and colon cancer w/ poorly differentiated adenocarcinoma s/p loop ileostomy (22) and undergoing tx w/ FOLFIRI who comes in w/ symptoms of worsening nausea and vomiting that has been on-going since Tuesday. He notes at least 5 bilious episodes per day. He has chronic abdominal pain due to his cancer history but denies any new or worsening symptoms. He has not had any recent significant weight loss or decreased stool output. He does report of small liquid bowel movement earlier on in the day. He has no fever, chills or night sweats. He has no chest pain, palpitations, coughing, wheezing or dyspnea. Past Medical History:   Diagnosis Date    Anxiety and depression     Cancer ([facility] [address] 02/2022    COLON    Chronic pain     lower back- lymphoma    Hyperlipidemia     Hypertension     NO MEDICATION FOR PAST 9 MONTHS    Lymphadenopathy 11/12/2018    Lymphoma, follicular ([facility] [address] 26/01/8557    chemo    Seizures ([facility] CHILDHOOD    NEVER TOOK MEDICATION - \"GREW OUT OF THEM\"      Past Surgical History:   Procedure Laterality Date    HX ABDOMINAL LAPAROSCOPY  04/27/2022    Peritoneal biopsy Dr. Emir Mayer    HX COLONOSCOPY      HX HEART CATHETERIZATION  02/14/2019    LAD stent    HX ILEOSTOMY      HX OTHER SURGICAL Right 11/13/2018    inguinal lymph node excisional biopsy: high-grade follicular lymphoma    IR INJECTION PSEUDOANEURYSM  02/26/2019    [place] UNLISTED PROCEDURE ABDOMEN PERITONEUM & OMENTUM  02/2022    COLON RESECTION WITH ILEOSTOMY    [place] UNLISTED PROCEDURE CARDIAC SURGERY  2019    STENT X1     Social History     Tobacco Use    Smoking status: Every Day     Packs/day: 0.50     Years: 20.00     Pack years: 10.00     Types: Cigarettes    Smokeless tobacco: Never    Tobacco comments:     \"STOP SMOKING\" PACKET GIVEN TO PATIENT   Substance Use Topics    Alcohol use: No      Family History   Problem Relation Age of Onset    Hypertension Father     Diabetes Father     Cancer Father         PROSTATE    Diabetes Mother     No Known Problems Brother     No Known Problems Brother     Anesth Problems Neg Hx         No Known Allergies     Prior to Admission medications    Medication Sig Start Date End Date Taking?  Authorizing Provider loperamide (Imodium A-D) 2 mg tablet Take 1 Tablet by mouth three (3) times daily as needed for Diarrhea. Patient not taking: Reported on 2/6/2023 2/6/23   Amos Blake NP   oxyCODONE ER (OxyCONTIN) 20 mg ER tablet Take 1 Tablet by mouth every eight (8) hours for 30 days. Max Daily Amount: 60 mg. 2/6/23 3/8/23  Marcel Montalvo MD   oxyCODONE IR (ROXICODONE) 10 mg tab immediate release tablet Take 2 Tablets by mouth every four (4) hours as needed for Pain for up to 15 days. Max Daily Amount: 120 mg. 1/31/23 2/15/23  Marcel Montalvo MD   gabapentin (NEURONTIN) 300 mg capsule TAKE 1 CAPSULE BY MOUTH NIGHTLY. MAX DAILY AMOUNT: 300 MG. INDICATIONS: NEUROPATHIC PAIN 1/23/23   Marcel Montalvo MD   aspirin delayed-release 81 mg tablet Take 1 Tablet by mouth daily. 1/23/23   Amos Blake NP   clindamycin (CLEOCIN T) 1 % external solution Apply 1 mL to affected area two (2) times a day. use thin film on upper chest, back and face. Apply with cotton swab. 1/23/23   Amos Blake NP   atorvastatin (LIPITOR) 20 mg tablet Take 1 Tablet by mouth daily. 1/11/23   Lakeisha Amador MD   lidocaine (LIDODERM) 5 % 1 Patch by TransDERmal route every twenty-four (24) hours. Apply patch to the abdomen for 12 hours a day and remove for 12 hours a day. 12/20/22   Amos Blake NP   potassium chloride (KLOR-CON M10) 10 mEq tablet Take 1 Tablet by mouth daily. 12/14/22   Amos Blake NP   ondansetron hcl (ZOFRAN) 8 mg tablet Take 1 Tablet by mouth every eight (8) hours as needed for Nausea or Vomiting. 5/6/22   Amos Blake NP   prochlorperazine (Compazine) 10 mg tablet Take 1 Tablet by mouth every six (6) hours as needed for Nausea or Vomiting. 5/6/22   Lou, Trinidad Corn, NP   lidocaine-prilocaine (EMLA) topical cream Apply a dime size amount to port site 30 minutes before treatment to prevent pain.  5/6/22   Amos Blake, NP   dexAMETHasone (DECADRON) 4 mg tablet Take 8mg (2 x tabs) on days 2 and 3 after treatment to prevent nausea. Take in the AM with food. 5/6/22   Karthik Blake, NP   multivitamin (ONE A DAY) tablet Take 1 Tablet by mouth daily.     Provider, Historical       REVIEW OF SYSTEMS:  See HPI for details  General: negative for fever, chills, sweats, weakness, weight loss  Eyes: negative for blurred vision, eye pain, loss of vision, diplopia  Ear Nose and Throat: negative for rhinorrhea, pharyngitis, otalgia, tinnitus, speech or swallowing difficulties  Respiratory:  negative for pleuritic pain, cough, sputum production, wheezing, SOB, MOORE  Cardiology:  negative for chest pain, palpitations, orthopnea, PND, edema, syncope   Gastrointestinal: +chronic abdominal pain, +N/+V, dysphagia, change in bowel habits, bleeding  Genitourinary: negative for frequency, urgency, dysuria, hematuria, incontinence  Muskuloskeletal : negative for arthralgia, myalgia  Hematology: negative for easy bruising, bleeding, lymphadenopathy  Dermatological: negative for rash, ulceration, mole change, new lesion  Endocrine: negative for hot flashes or polydipsia  Neurological: negative for headache, dizziness, confusion, focal weakness, paresthesia, memory loss, gait disturbance  Psychological: negative for anxiety, depression, agitation      Objective:   VITALS:    Visit Vitals  BP (!) 122/95 (BP 1 Location: Left upper arm, BP Patient Position: At rest;Reclining)   Pulse (!) 101   Temp 98.4 °F (36.9 °C)   Resp 19   Ht 5' 7\" (1.702 m)   Wt 54 kg (119 lb)   SpO2 96%   BMI 18.64 kg/m²     PHYSICAL EXAM:    Physical Exam:    Gen: Well-developed, well-nourished, in no acute distress  HEENT:  Pink conjunctivae, PERRL, hearing intact to voice, moist mucous membranes  Neck: Supple, without masses, thyroid non-tender  Resp: No accessory muscle use, clear breath sounds without wheezes rales or rhonchi +Port placement C/D/I  Card: No murmurs, normal S1, S2 without thrills, bruits or peripheral edema  Abd:  Soft, non-tender, non-distended, normoactive bowel sounds are present, +ostomy C/D/I  Lymph:  No cervical or inguinal adenopathy  Musc: No cyanosis or clubbing  Skin: No rashes or ulcers, skin turgor is good  Neuro:  Cranial nerves are grossly intact, no focal motor weakness, follows commands appropriately  Psych:  Good insight, oriented to person, place and time, alert  _______________________________________________________________________  Care Plan discussed with:  Pt's condition, Imaging findings, Lab findings, Assessment, and Care Plan discussed with: Patient  _______________________________________________________________________    Probable disposition:  Home  ________________________________________________________________________    Comments   >50% of visit spent in counseling and coordination of care  Chart reviewed  Discussion with patient and/or family and questions answered     ________________________________________________________________________  Signed: Geovanny Tolliver MD        Procedures: see electronic medical records for all procedures/Xrays and details which were not copied into this note but were reviewed prior to creation of Plan.     LAB DATA REVIEWED:    Recent Results (from the past 24 hour(s))   METABOLIC PANEL, BASIC    Collection Time: 02/09/23  3:29 PM   Result Value Ref Range    Sodium 127 (L) 136 - 145 mmol/L    Potassium 3.4 (L) 3.5 - 5.1 mmol/L    Chloride 87 (L) 97 - 108 mmol/L    CO2 29 21 - 32 mmol/L    Anion gap 11 5 - 15 mmol/L    Glucose 153 (H) 65 - 100 mg/dL    BUN 37 (H) 6 - 20 MG/DL    Creatinine 2.56 (H) 0.70 - 1.30 MG/DL    BUN/Creatinine ratio 14 12 - 20      eGFR 31 (L) >60 ml/min/1.73m2    Calcium 10.5 (H) 8.5 - 10.1 MG/DL   CBC WITH AUTOMATED DIFF    Collection Time: 02/09/23  6:16 PM   Result Value Ref Range    WBC 7.1 4.1 - 11.1 K/uL    RBC 3.89 (L) 4.10 - 5.70 M/uL    HGB 12.2 12.1 - 17.0 g/dL    HCT 36.1 (L) 36.6 - 50.3 %    MCV 92.8 80.0 - 99.0 FL    MCH 31.4 26.0 - 34.0 PG    MCHC 33.8 30.0 - 36.5 g/dL    RDW 15.0 (H) 11.5 - 14.5 %    PLATELET 629 190 - 388 K/uL    MPV 10.4 8.9 - 12.9 FL    NRBC 0.0 0  WBC    ABSOLUTE NRBC 0.00 0.00 - 0.01 K/uL    NEUTROPHILS 71 32 - 75 %    BAND NEUTROPHILS 7 (H) 0 - 6 %    LYMPHOCYTES 9 (L) 12 - 49 %    MONOCYTES 11 5 - 13 %    EOSINOPHILS 0 0 - 7 %    BASOPHILS 0 0 - 1 %    MYELOCYTES 2 (H) 0 %    IMMATURE GRANULOCYTES 0 %    ABS. NEUTROPHILS 5.5 1.8 - 8.0 K/UL    ABS. LYMPHOCYTES 0.6 (L) 0.8 - 3.5 K/UL    ABS. MONOCYTES 0.8 0.0 - 1.0 K/UL    ABS. EOSINOPHILS 0.0 0.0 - 0.4 K/UL    ABS. BASOPHILS 0.0 0.0 - 0.1 K/UL    ABS. IMM.  GRANS. 0.0 K/UL    DF MANUAL      RBC COMMENTS ANISOCYTOSIS  1+       LIPASE    Collection Time: 02/09/23  6:16 PM   Result Value Ref Range    Lipase 49 (L) 73 - 393 U/L

## 2023-02-10 NOTE — ED TRIAGE NOTES
Pt was just DC a couple of hours ago.    Pt was dx with SBO prior to leaving AMA  Report nausea and abd pain

## 2023-02-10 NOTE — CONSULTS
General Surgery Consult    Patient is a 41year-old man with history of high-grade follicular lymphoma s/p Obinutuzumab-CHOP in 2018 with Stage IV colon adenocarcinoma with peritoneal carcinomatosis and loop ileostomy in Feb 2022 on FOLFIRI/Panitumumab and history of small bowel obstruction who presented to the ER with abdominal pain and nausea/vomiting. CT abdomen/pelvis demonstrated worsening carcinomatosis, significantly distended stomach and multiple loops of small bowel consistent with small bowel obstruction and a parastomal hernia that does not appear to be the cause of the obstruction. HPI: Patient states he had increased abdominal pain on top of his baseline chronic pain but mostly nausea/vomiting yest.  Noticed slightly decreased output from his ileostomy. No family currently at bedside.     Past Medical History:   Diagnosis Date    Anxiety and depression     Cancer ([facility] [address] 02/2022    COLON    Chronic pain     lower back- lymphoma    Hyperlipidemia     Hypertension     NO MEDICATION FOR PAST 9 MONTHS    Lymphadenopathy 11/12/2018    Lymphoma, follicular ([address] 34/36/7121    chemo    Seizures ([facility] [address] CHILDHOOD    NEVER TOOK MEDICATION - \"GREW OUT OF THEM\"     Past Surgical History:   Procedure Laterality Date    HX ABDOMINAL LAPAROSCOPY  04/27/2022    Peritoneal biopsy Dr. Riggs Grecia    HX COLONOSCOPY      HX HEART CATHETERIZATION  02/14/2019    LAD stent    HX ILEOSTOMY      HX OTHER SURGICAL Right 11/13/2018    inguinal lymph node excisional biopsy: high-grade follicular lymphoma    IR INJECTION PSEUDOANEURYSM  02/26/2019    [place] UNLISTED PROCEDURE ABDOMEN PERITONEUM & OMENTUM  02/2022    COLON RESECTION WITH ILEOSTOMY    [place] UNLISTED PROCEDURE CARDIAC SURGERY  2019    STENT X1     Current Facility-Administered Medications on File Prior to Encounter   Medication Dose Route Frequency Provider Last Rate Last Admin    [COMPLETED] sodium chloride 0.9 % bolus infusion 1,000 mL  1,000 mL IntraVENous ONCE Lou Merline Joiner, NP   IV Completed at 23 1635    [] sodium chloride (NS) flush 5-40 mL  5-40 mL IntraVENous PRN Robinett, Merline Joiner, NP   10 mL at 23 1635    [] 0.9% sodium chloride injection 5-40 mL  5-40 mL IntraVENous PRN Robinett, Merline Joiner, NP   10 mL at 23 1520    [] 0.9% sodium chloride infusion  5-250 mL/hr IntraVENous PRN Robinett, Merline Joiner, NP        [] heparin (porcine) pf 500 Units  500 Units InterCATHeter PRN Robinett, Merline Joiner, NP        [] alteplase (CATHFLO) 2 mg in sterile water (preservative free) 2 mL injection  2 mg InterCATHeter PRN Robinett, Merline Joiner, NP        [COMPLETED] HYDROmorphone (DILAUDID) injection 1 mg  1 mg IntraVENous ONCE Dee Blevins MD   1 mg at 23 181    [COMPLETED] ondansetron (ZOFRAN) injection 4 mg  4 mg IntraVENous ONCE Dee Blevins MD   4 mg at 23 181     Current Outpatient Medications on File Prior to Encounter   Medication Sig Dispense Refill    oxyCODONE ER (OxyCONTIN) 20 mg ER tablet Take 1 Tablet by mouth every eight (8) hours for 30 days. Max Daily Amount: 60 mg. 90 Tablet 0    oxyCODONE IR (ROXICODONE) 10 mg tab immediate release tablet Take 2 Tablets by mouth every four (4) hours as needed for Pain for up to 15 days. Max Daily Amount: 120 mg. 180 Tablet 0    gabapentin (NEURONTIN) 300 mg capsule TAKE 1 CAPSULE BY MOUTH NIGHTLY. MAX DAILY AMOUNT: 300 MG. INDICATIONS: NEUROPATHIC PAIN 30 Capsule 0    aspirin delayed-release 81 mg tablet Take 1 Tablet by mouth daily. 90 Tablet 1    clindamycin (CLEOCIN T) 1 % external solution Apply 1 mL to affected area two (2) times a day. use thin film on upper chest, back and face. Apply with cotton swab. 60 mL 1    atorvastatin (LIPITOR) 20 mg tablet Take 1 Tablet by mouth daily. 90 Tablet 4    potassium chloride (KLOR-CON M10) 10 mEq tablet Take 1 Tablet by mouth daily.  90 Tablet 1    ondansetron hcl (ZOFRAN) 8 mg tablet Take 1 Tablet by mouth every eight (8) hours as needed for Nausea or Vomiting. 30 Tablet 2    prochlorperazine (Compazine) 10 mg tablet Take 1 Tablet by mouth every six (6) hours as needed for Nausea or Vomiting. 30 Tablet 2    multivitamin (ONE A DAY) tablet Take 1 Tablet by mouth daily. loperamide (Imodium A-D) 2 mg tablet Take 1 Tablet by mouth three (3) times daily as needed for Diarrhea. (Patient not taking: No sig reported) 30 Tablet 0    lidocaine (LIDODERM) 5 % 1 Patch by TransDERmal route every twenty-four (24) hours. Apply patch to the abdomen for 12 hours a day and remove for 12 hours a day. (Patient not taking: Reported on 2/10/2023) 10 Each 1    lidocaine-prilocaine (EMLA) topical cream Apply a dime size amount to port site 30 minutes before treatment to prevent pain. (Patient not taking: Reported on 2/10/2023) 30 g 2    dexAMETHasone (DECADRON) 4 mg tablet Take 8mg (2 x tabs) on days 2 and 3 after treatment to prevent nausea. Take in the AM with food.  30 Tablet 3     No Known Allergies    Social History     Socioeconomic History    Marital status:      Spouse name: Not on file    Number of children: 2    Years of education: 11    Highest education level: 11th grade   Occupational History     Comment: resturant manager,   Tobacco Use    Smoking status: Every Day     Packs/day: 0.50     Years: 20.00     Pack years: 10.00     Types: Cigarettes    Smokeless tobacco: Never    Tobacco comments:     \"STOP SMOKING\" PACKET GIVEN TO PATIENT   Vaping Use    Vaping Use: Never used   Substance and Sexual Activity    Alcohol use: No    Drug use: No    Sexual activity: Yes   Other Topics Concern     Service No    Blood Transfusions No    Caffeine Concern Yes    Occupational Exposure No    Hobby Hazards No    Sleep Concern No    Stress Concern No    Weight Concern No    Special Diet No    Back Care No    Exercise No    Bike Helmet No    Seat Belt No    Self-Exams No   Social History Narrative    Not on file     Social Determinants of Health Financial Resource Strain: Not on file   Food Insecurity: Not on file   Transportation Needs: Not on file   Physical Activity: Not on file   Stress: Not on file   Social Connections: Not on file   Intimate Partner Violence: Not on file   Housing Stability: Not on file     ROS  CONSTITUTIONAL: no fevers/chills  CV: no chest pain/palpitations/skipped beats/irregular beats  RESP: no shortness of breath  GI: (+) diffuse abdominal pain; (+) mild bloating; (+) nausea/vomiting; slightly less output from ileostomy  : no frequency/difficulty urinating/pain on urination/blood in urine      Patient Vitals for the past 24 hrs:   Temp Pulse Resp BP SpO2   02/10/23 1513 98.1 °F (36.7 °C) 82 16 (!) 150/97 --   02/10/23 1120 98.1 °F (36.7 °C) 91 18 (!) 159/99 96 %   02/10/23 0754 97.9 °F (36.6 °C) 89 18 (!) 149/99 97 %   02/10/23 0535 97.4 °F (36.3 °C) 90 18 134/67 97 %   02/10/23 0421 97.9 °F (36.6 °C) (!) 102 18 (!) 136/99 99 %   02/10/23 0201 97.9 °F (36.6 °C) (!) 115 16 (!) 131/96 97 %     Date 02/09/23 0700 - 02/10/23 0659(Not Admitted) 02/10/23 0700 - 02/11/23 0659   Shift [id number] 1900-0659 24 Hour Total 0700-1859 1900-0659 24 Hour Total   INTAKE   P.O.    0  0     P. O.    0  0   I.V.    229.2  229.2     Volume (lactated Ringers infusion)    229.2  229.2   Shift Total    229.2  229.2   OUTPUT   Urine           Urine Occurrence(s)    0 x  0 x   Emesis/NG output  600 600 425  425     Emesis Occurrence(s)    0 x  0 x     Output (ml) (Nasogastric Tube 02/10/23)  600 600 425  425   Stool           Stool Occurrence(s)    0 x  0 x   Shift Total  600 600 425  425   NET  -600 -600 -195.8  -195.8   Weight (kg)           GEN: no acute distress; fatigued but arousable  EYES: no scleral icterus  CV: regular rate and rhythm   RESP: clear to auscultation   ABD: soft; (+) mild diffuse tenderness; non-distended; well-healed port site scars; ileostomy pink w/ succus in the bag LYMPH: no extremity edema      Recent Results (from the past 24 hour(s))   CBC WITH AUTOMATED DIFF    Collection Time: 02/09/23  6:16 PM   Result Value Ref Range    WBC 7.1 4.1 - 11.1 K/uL    RBC 3.89 (L) 4.10 - 5.70 M/uL    HGB 12.2 12.1 - 17.0 g/dL    HCT 36.1 (L) 36.6 - 50.3 %    MCV 92.8 80.0 - 99.0 FL    MCH 31.4 26.0 - 34.0 PG    MCHC 33.8 30.0 - 36.5 g/dL    RDW 15.0 (H) 11.5 - 14.5 %    PLATELET 169 577 - 792 K/uL    MPV 10.4 8.9 - 12.9 FL    NRBC 0.0 0  WBC    ABSOLUTE NRBC 0.00 0.00 - 0.01 K/uL    NEUTROPHILS 71 32 - 75 %    BAND NEUTROPHILS 7 (H) 0 - 6 %    LYMPHOCYTES 9 (L) 12 - 49 %    MONOCYTES 11 5 - 13 %    EOSINOPHILS 0 0 - 7 %    BASOPHILS 0 0 - 1 %    MYELOCYTES 2 (H) 0 %    IMMATURE GRANULOCYTES 0 %    ABS. NEUTROPHILS 5.5 1.8 - 8.0 K/UL    ABS. LYMPHOCYTES 0.6 (L) 0.8 - 3.5 K/UL    ABS. MONOCYTES 0.8 0.0 - 1.0 K/UL    ABS. EOSINOPHILS 0.0 0.0 - 0.4 K/UL    ABS. BASOPHILS 0.0 0.0 - 0.1 K/UL    ABS. IMM. GRANS. 0.0 K/UL    DF MANUAL      RBC COMMENTS ANISOCYTOSIS  1+       LIPASE    Collection Time: 02/09/23  6:16 PM   Result Value Ref Range    Lipase 49 (L) 73 - 393 U/L       CT Results  (Last 48 hours)                 02/09/23 1800  CT ABD PELV WO CONT Final result    Impression:  Small bowel obstruction with worsening of carcinomatosis. Narrative:  EXAM: CT ABD PELV WO CONT       INDICATION: abd pain/  n/v/  colon cancer       COMPARISON: December 29, 2022       IV CONTRAST: None. ORAL CONTRAST: None       TECHNIQUE:    Thin axial images were obtained through the abdomen and pelvis. Coronal and   sagittal reformats were generated. CT dose reduction was achieved through use of   a standardized protocol tailored for this examination and automatic exposure   control for dose modulation. The absence of intravenous contrast material reduces the sensitivity for   evaluation of the vasculature and solid organs.        FINDINGS:    LOWER THORAX: No significant abnormality in the incidentally imaged lower chest.   LIVER: No change with diffuse perihepatic soft tissue infiltration. BILIARY TREE: Gallbladder is within normal limits. CBD is not dilated. SPLEEN: within normal limits. PANCREAS: No focal abnormality. ADRENALS: Unremarkable. KIDNEYS/URETERS: No calculus or hydronephrosis. Atrophic left kidney unchanged   STOMACH: Unremarkable. SMALL BOWEL: Significant small bowel distention with multiple air-fluid level   compatible with obstruction. Could be related to the parastomal hernia possibly   at the lesion or masses. COLON: Mild prominence of the right colon and the thickening of the wall is no   hernia. Significant increase in size of some of the retroperitoneal lesion the a   periumbilical lesion measures at this time 18 mm, it was 14. PERITONEUM: Carcinomatosis or. RETROPERITONEUM: No aortic aneurysm. URINARY BLADDER: No mass or calculus. BONES: No destructive bone lesion. ADDITIONAL COMMENTS: N/A                   A/P: Patient is a 41year-old man with history of high-grade follicular lymphoma s/p Obinutuzumab-CHOP in 2018 with Stage IV colon adenocarcinoma with peritoneal carcinomatosis and loop ileostomy in Feb 2022 on FOLFIRI/Panitumumab and history of small bowel obstruction admitted for recurrent small bowel obstruction. -- Cont NGT.    -- High surgical risk. Thank you for the opportunity to participate in this patient's care. Dee Dominguez.  Radha Martinez MD, FACS

## 2023-02-10 NOTE — H&P
Hospitalist Admission Note    NAME:  Willow Palmer Sr.   :  1975   MRN:  [id number]     Date/Time:  2/10/2023 3:20 AM    Patient PCP: Willow Palmer MD  ________________________________________________________________________    Given the patient's current clinical presentation, I have a high level of concern for decompensation if discharged from the emergency department. Complex decision making was performed, which includes reviewing the patient's available past medical records, laboratory results, and x-ray films. My assessment of this patient's clinical condition and my plan of care is as follows. Assessment / Plan:    SBO:     The patient comes in w/ n/v since Tuesday w/ PMH colon cancer     VSS -   WBC normal, Hg 12.2  Na 127, K 3.4  BUN 37, Cr 2.56 (23 Cr 1.08)  CT Abdomen/Pelvis - small bowel obstruction w/ worsening carcinomatosis     And the patient returns after his AMA escapade  Admit and cont to monitor  NPO, IVF, anti-emetics and pain management  Insert NG tube w/ LWS and re-evaluate symptoms  Consult Gen Surgery      ALMA:     Cont w/ IVF and repeat BMP    There is no height or weight on file to calculate BMI.:  less than 18.5:  Underweight      I have personally reviewed the radiographs, laboratory data in Epic and decisions and statements above are based partially on this personal interpretation. Code Status: Full Code    Prophylaxis:  Lovenox SQ     Subjective:   CHIEF COMPLAINT: Abdominal Pain    HISTORY OF PRESENT ILLNESS:       The patient is a 40 y/o C M w/ PMH follicular lymphoma s/p chemotherapy currently undera ctive surveillance and colon cancer w/ poorly differentiated adenocarcinoma s/p loop ileostomy (22) and undergoing tx w/ FOLFIRI who comes in w/ symptoms of worsening nausea and vomiting that has been on-going since Tuesday. He notes at least 5 bilious episodes per day.   He has chronic abdominal pain due to his cancer history but denies any new or worsening symptoms. He has not had any recent significant weight loss or decreased stool output. He does report of small liquid bowel movement earlier on in the day. He has no fever, chills or night sweats. He has no chest pain, palpitations, coughing, wheezing or dyspnea. Past Medical History:   Diagnosis Date    Anxiety and depression     Cancer ([facility] 02/2022    COLON    Chronic pain     lower back- lymphoma    Hyperlipidemia     Hypertension     NO MEDICATION FOR PAST 9 MONTHS    Lymphadenopathy 11/12/2018    Lymphoma, follicular ([facility] 65/93/4708    chemo    Seizures ([facility] CHILDHOOD    NEVER TOOK MEDICATION - \"GREW OUT OF THEM\"      Past Surgical History:   Procedure Laterality Date    HX ABDOMINAL LAPAROSCOPY  04/27/2022    Peritoneal biopsy Dr. Vincenzo Vann    HX COLONOSCOPY      HX HEART CATHETERIZATION  02/14/2019    LAD stent    HX ILEOSTOMY      HX OTHER SURGICAL Right 11/13/2018    inguinal lymph node excisional biopsy: high-grade follicular lymphoma    IR INJECTION PSEUDOANEURYSM  02/26/2019    MD UNLISTED PROCEDURE ABDOMEN PERITONEUM & OMENTUM  02/2022    COLON RESECTION WITH ILEOSTOMY    MD UNLISTED PROCEDURE CARDIAC SURGERY  2019    STENT X1     Social History     Tobacco Use    Smoking status: Every Day     Packs/day: 0.50     Years: 20.00     Pack years: 10.00     Types: Cigarettes    Smokeless tobacco: Never    Tobacco comments:     \"STOP SMOKING\" PACKET GIVEN TO PATIENT   Substance Use Topics    Alcohol use: No      Family History   Problem Relation Age of Onset    Hypertension Father     Diabetes Father     Cancer Father         PROSTATE    Diabetes Mother     No Known Problems Brother     No Known Problems Brother     Anesth Problems Neg Hx         No Known Allergies     Prior to Admission medications    Medication Sig Start Date End Date Taking? Authorizing Provider   loperamide (Imodium A-D) 2 mg tablet Take 1 Tablet by mouth three (3) times daily as needed for Diarrhea.   Patient not taking: Reported on 2/6/2023 2/6/23   Jered Blake NP   oxyCODONE ER (OxyCONTIN) 20 mg ER tablet Take 1 Tablet by mouth every eight (8) hours for 30 days. Max Daily Amount: 60 mg. 2/6/23 3/8/23  Marcel Montalvo MD   oxyCODONE IR (ROXICODONE) 10 mg tab immediate release tablet Take 2 Tablets by mouth every four (4) hours as needed for Pain for up to 15 days. Max Daily Amount: 120 mg. 1/31/23 2/15/23  Marcel Montalvo MD   gabapentin (NEURONTIN) 300 mg capsule TAKE 1 CAPSULE BY MOUTH NIGHTLY. MAX DAILY AMOUNT: 300 MG. INDICATIONS: NEUROPATHIC PAIN 1/23/23   Marcel Montalvo MD   aspirin delayed-release 81 mg tablet Take 1 Tablet by mouth daily. 1/23/23   Jered Blake NP   clindamycin (CLEOCIN T) 1 % external solution Apply 1 mL to affected area two (2) times a day. use thin film on upper chest, back and face. Apply with cotton swab. 1/23/23   Jered Blake, NP   atorvastatin (LIPITOR) 20 mg tablet Take 1 Tablet by mouth daily. 1/11/23   Ron Amador MD   lidocaine (LIDODERM) 5 % 1 Patch by TransDERmal route every twenty-four (24) hours. Apply patch to the abdomen for 12 hours a day and remove for 12 hours a day. 12/20/22   Jered Blake, RODY   potassium chloride (KLOR-CON M10) 10 mEq tablet Take 1 Tablet by mouth daily. 12/14/22   Jered Blake, NP   ondansetron hcl (ZOFRAN) 8 mg tablet Take 1 Tablet by mouth every eight (8) hours as needed for Nausea or Vomiting. 5/6/22   Jered Blake, RODY   prochlorperazine (Compazine) 10 mg tablet Take 1 Tablet by mouth every six (6) hours as needed for Nausea or Vomiting. 5/6/22   Jered Blake NP   lidocaine-prilocaine (EMLA) topical cream Apply a dime size amount to port site 30 minutes before treatment to prevent pain. 5/6/22   Lou, Frosty Body, NP   dexAMETHasone (DECADRON) 4 mg tablet Take 8mg (2 x tabs) on days 2 and 3 after treatment to prevent nausea. Take in the AM with food.  5/6/22   Lou, Columbasty Body, NP   multivitamin (ONE A DAY) tablet Take 1 Tablet by mouth daily.     Provider, Historical       REVIEW OF SYSTEMS:  See HPI for details  General: negative for fever, chills, sweats, weakness, weight loss  Eyes: negative for blurred vision, eye pain, loss of vision, diplopia  Ear Nose and Throat: negative for rhinorrhea, pharyngitis, otalgia, tinnitus, speech or swallowing difficulties  Respiratory:  negative for pleuritic pain, cough, sputum production, wheezing, SOB, MOORE  Cardiology:  negative for chest pain, palpitations, orthopnea, PND, edema, syncope   Gastrointestinal: +abdominal pain, +N/+V, dysphagia, change in bowel habits, bleeding  Genitourinary: negative for frequency, urgency, dysuria, hematuria, incontinence  Muskuloskeletal : negative for arthralgia, myalgia  Hematology: negative for easy bruising, bleeding, lymphadenopathy  Dermatological: negative for rash, ulceration, mole change, new lesion  Endocrine: negative for hot flashes or polydipsia  Neurological: negative for headache, dizziness, confusion, focal weakness, paresthesia, memory loss, gait disturbance  Psychological: negative for anxiety, depression, agitation    Objective:   VITALS:    Visit Vitals  BP (!) 131/96 (BP 1 Location: Left upper arm, BP Patient Position: Sitting)   Pulse (!) 115   Temp 97.9 °F (36.6 °C)   Resp 16   SpO2 97%     PHYSICAL EXAM:    Physical Exam:    Gen: Well-developed, well-nourished, in no acute distress  HEENT:  Pink conjunctivae, PERRL, hearing intact to voice, moist mucous membranes  Neck: Supple, without masses, thyroid non-tender  Resp: No accessory muscle use, clear breath sounds without wheezes rales or rhonchi  Card: No murmurs, normal S1, S2 without thrills, bruits or peripheral edema  Abd:  Soft, non-tender, non-distended, normoactive bowel sounds are present, no palpable organomegaly and no detectable hernias  Lymph:  No cervical or inguinal adenopathy  Musc: No cyanosis or clubbing  Skin: No rashes or ulcers, skin turgor is good  Neuro:  Cranial nerves are grossly intact, no focal motor weakness, follows commands appropriately  Psych:  oriented to person, place and time, alert  _______________________________________________________________________  Care Plan discussed with:  Pt's condition, Imaging findings, Lab findings, Assessment, and Care Plan discussed with: Patient  _______________________________________________________________________    Probable disposition:  Home  ________________________________________________________________________    Comments   >50% of visit spent in counseling and coordination of care  Chart reviewed  Discussion with patient and/or family and questions answered     ________________________________________________________________________  Signed: Kami Kunz MD        Procedures: see electronic medical records for all procedures/Xrays and details which were not copied into this note but were reviewed prior to creation of Plan.     LAB DATA REVIEWED:    Recent Results (from the past 24 hour(s))   METABOLIC PANEL, BASIC    Collection Time: 02/09/23  3:29 PM   Result Value Ref Range    Sodium 127 (L) 136 - 145 mmol/L    Potassium 3.4 (L) 3.5 - 5.1 mmol/L    Chloride 87 (L) 97 - 108 mmol/L    CO2 29 21 - 32 mmol/L    Anion gap 11 5 - 15 mmol/L    Glucose 153 (H) 65 - 100 mg/dL    BUN 37 (H) 6 - 20 MG/DL    Creatinine 2.56 (H) 0.70 - 1.30 MG/DL    BUN/Creatinine ratio 14 12 - 20      eGFR 31 (L) >60 ml/min/1.73m2    Calcium 10.5 (H) 8.5 - 10.1 MG/DL   CBC WITH AUTOMATED DIFF    Collection Time: 02/09/23  6:16 PM   Result Value Ref Range    WBC 7.1 4.1 - 11.1 K/uL    RBC 3.89 (L) 4.10 - 5.70 M/uL    HGB 12.2 12.1 - 17.0 g/dL    HCT 36.1 (L) 36.6 - 50.3 %    MCV 92.8 80.0 - 99.0 FL    MCH 31.4 26.0 - 34.0 PG    MCHC 33.8 30.0 - 36.5 g/dL    RDW 15.0 (H) 11.5 - 14.5 %    PLATELET 130 900 - 722 K/uL    MPV 10.4 8.9 - 12.9 FL    NRBC 0.0 0  WBC    ABSOLUTE NRBC 0.00 0.00 - 0.01 K/uL    NEUTROPHILS 71 32 - 75 %    BAND NEUTROPHILS 7 (H) 0 - 6 %    LYMPHOCYTES 9 (L) 12 - 49 %    MONOCYTES 11 5 - 13 %    EOSINOPHILS 0 0 - 7 %    BASOPHILS 0 0 - 1 %    MYELOCYTES 2 (H) 0 %    IMMATURE GRANULOCYTES 0 %    ABS. NEUTROPHILS 5.5 1.8 - 8.0 K/UL    ABS. LYMPHOCYTES 0.6 (L) 0.8 - 3.5 K/UL    ABS. MONOCYTES 0.8 0.0 - 1.0 K/UL    ABS. EOSINOPHILS 0.0 0.0 - 0.4 K/UL    ABS. BASOPHILS 0.0 0.0 - 0.1 K/UL    ABS. IMM.  GRANS. 0.0 K/UL    DF MANUAL      RBC COMMENTS ANISOCYTOSIS  1+       LIPASE    Collection Time: 02/09/23  6:16 PM   Result Value Ref Range    Lipase 49 (L) 73 - 393 U/L

## 2023-02-10 NOTE — CONSULTS
Palliative Medicine Consult  Paramjit: [id number] ([id number])    Patient Name: Lisa Ledbetter. YOB: 1975    Date of Initial Consult: February 10, 2023  Reason for Consult: Care Decisions  Requesting Provider: Dr. Abhay Mcguire   Primary Care Physician: Saibna Zamudio MD     SUMMARY:     Lisa Garcia is a 41y.o. year old with high-grade follicular lymphoma, who was referred to Palliative Medicine by Dr. Sherly Garza for symptom management and supportive care. He was diagnosed in 11/2018 after presenting with back pain, treated with systemic chemotherapy with complete response. He suffered cardiac arrest during cycle #3 due to previously undiagnosed severe stenosis of the LAD s/p PTCA and stent. He was diagnosed with poorly differentiated carcinoma of the colon (no evidence of metastatic disease) in 2/14/22 and underwent loop ileostomy at [facility] on 2/19/22. He underwent exploratory laparoscopy in 4/2022 which revealed a large tumor at the hepatic flexure peritoneal carcinomatosis. Scans 12/2022 revealed disease progression. He is currently being treated with systemic chemotherapy under the care of Dr. Jordan Keller with the goal of palliation of symptoms and prolongation of life. He was admitted 2/10/23 from home with a diagnosis of SBO, ARF, nausea, abdominal and back pain. General surgery consulted. Seen in our clinic by Dr. Marcos Lewis on 2/6/23. The patients social history includes: he is single. He lives in [place] with his girlfriend, Shawna Liz, and their young old son. He has 3 teenage children who live with their mother and with whom he has close contact. 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 Palliative Medicine is addressing the following current patient/family concerns: abdominal pain related to malignancy; anxiety, depression; left mid- and low back pain, poor appetite, fatigue, advanced care planning.      PALLIATIVE DIAGNOSES: Palliative Care Encounter  Cancer Related Pain  Acute abdominal Pain  SBO  Nausea and vomiting     PLAN:   Cancer Pain: pt is prescribed Gabapentin 300mg hs,oxycodone ER 20mg every 8 and oxycodone 20mg every 4 hours as needed outpatient. He has morphine 2mg iv every 4 as needed ordered and verbalizes relief when taken. Unable to take oral due to SBO. His GFR is 31 and morphine is renally cleared so I will change to iv dilaudid 1mg every 2h as needed. SBO: NGT draining, zofran 4mg every 6 as needed  Surgery consult pending  ACP: he confirms his girlfriend is his surrogate decision maker. He agrees to complete AMD making it official this admission  Will follow up on Monday  Initial consult note routed to primary continuity provider and/or primary health care team members  Communicated plan of care with: Palliative [facility] Team     GOALS OF CARE / TREATMENT PREFERENCES:     GOALS OF CARE:  Patient/Health Care Proxy Stated Goals: Prolong life    TREATMENT PREFERENCES:   Code Status: Full Code    Patient and family's personal goals include: prolong life     Advance Care Planning:  [] [facility] Interdisciplinary Team has updated the ACP Navigator with [address] and Patient Capacity      Primary Decision Maker: Sander Villar - Girlfriend - [phone]  Advance Care Planning 2/10/2023   Patient's Healthcare Decision Maker is: -   Confirm Advance Directive Yes, on file   Patient Would Like to Complete Advance Directive -   Does the patient have other document types -       Medical Interventions: Full interventions       Other:    As far as possible, the palliative care team has discussed with patient / health care proxy about goals of care / treatment preferences for patient.      HISTORY:     History obtained from: chart, team,pt    CHIEF COMPLAINT: Pt admitted with aforementioned history and issues    HPI/SUBJECTIVE:    The patient is:   [x] Verbal and participatory  [] Non-participatory due to: medical condition   Says pain is controlled but grimaces at times during encounter     Clinical Pain Assessment (nonverbal scale for severity on nonverbal patients):   Clinical Pain Assessment  Severity: 2  Location: abdomen  Character: uto  Duration: acute on chronic  Effect: decreased appetite  Factors: pain medications working well  Frequency: frequent due to sbo          Duration: for how long has pt been experiencing pain (e.g., 2 days, 1 month, years)  Frequency: how often pain is an issue (e.g., several times per day, once every few days, constant)     FUNCTIONAL ASSESSMENT:     Palliative Performance Scale (PPS):  PPS: 70       PSYCHOSOCIAL/SPIRITUAL SCREENING:     Palliative IDT has assessed this patient for cultural preferences / practices and a referral made as appropriate to needs (Cultural Services, Patient Advocacy, Ethics, etc.)    Any spiritual / Judaism concerns:  [] Yes /  [x] No  [] Unable to obtain this information  If \"Yes\" to discuss with pastoral care during IDT     Does caregiver feel burdened by caring for their loved one:   [] Yes /  [] No /  [] No Caregiver Present/Available [x] No Caregiver [] Pt Lives at Facility  If \"Yes\" to discuss with social work during IDT    Anticipatory grief assessment:   [] Normal  / [] Maladaptive   [x] Unable to obtain this information  [] n/a  If \"Maladaptive\" to discuss with social work during IDT    ESAS Anxiety: Anxiety: 0    ESAS Depression:          REVIEW OF SYSTEMS:     Positive and pertinent negative findings in ROS are noted above in HPI. The following systems were [x] reviewed / [] unable to be reviewed as noted in HPI  Other findings are noted below. Systems: constitutional, ears/nose/mouth/throat, respiratory, gastrointestinal, genitourinary, musculoskeletal, integumentary, neurologic, psychiatric, endocrine. Positive findings noted below.   Modified ESAS Completed by: provider   Fatigue: 5 Drowsiness: 1     Pain: 2 Anxiety: 0 Nausea: 0     Dyspnea: 0           Stool Occurrence(s): 0        PHYSICAL EXAM:     From RN flowsheet:  Wt Readings from Last 3 Encounters:   02/09/23 119 lb (54 kg)   02/09/23 119 lb 9.6 oz (54.3 kg)   02/06/23 130 lb 8 oz (59.2 kg)     Blood pressure (!) 159/99, pulse 91, temperature 98.1 °F (36.7 °C), resp. rate 18, SpO2 96 %.     Pain Scale 1: Numeric (0 - 10)  Pain Intensity 1: 4                 Last bowel movement, if known:     Constitutional: ill appearing  Eyes: pupils equal, anicteric  ENMT: NGT draining green liquid  Cardiovascular:   Respiratory: breathing not labored, symmetric  Gastrointestinal: deferred  Musculoskeletal: no deformity  Skin: warm, dry  Neurologic: following commands, moving all extremities  Psychiatric: flat affect  Other:       HISTORY:     Active Problems:    SBO (small bowel obstruction) ([address] (2/10/2023)    Past Medical History:   Diagnosis Date    Anxiety and depression     Cancer ([address] 02/2022    COLON    Chronic pain     lower back- lymphoma    Hyperlipidemia     Hypertension     NO MEDICATION FOR PAST 9 MONTHS    Lymphadenopathy 11/12/2018    Lymphoma, follicular ([address] 11/98/6942    chemo    Seizures ([address] CHILDHOOD    NEVER TOOK MEDICATION - \"GREW OUT OF THEM\"      Past Surgical History:   Procedure Laterality Date    HX ABDOMINAL LAPAROSCOPY  04/27/2022    Peritoneal biopsy Dr. Brandi Mosquera    HX COLONOSCOPY      HX HEART CATHETERIZATION  02/14/2019    LAD stent    HX ILEOSTOMY      HX OTHER SURGICAL Right 11/13/2018    inguinal lymph node excisional biopsy: high-grade follicular lymphoma    IR INJECTION PSEUDOANEURYSM  02/26/2019    RI UNLISTED PROCEDURE ABDOMEN PERITONEUM & OMENTUM  02/2022    COLON RESECTION WITH ILEOSTOMY    RI UNLISTED PROCEDURE CARDIAC SURGERY  2019    STENT X1      Family History   Problem Relation Age of Onset    Hypertension Father     Diabetes Father     Cancer Father         PROSTATE    Diabetes Mother     No Known Problems Brother     No Known Problems Brother     Anesth Problems Neg Hx       History reviewed, no pertinent family history. Social History     Tobacco Use    Smoking status: Every Day     Packs/day: 0.50     Years: 20.00     Pack years: 10.00     Types: Cigarettes    Smokeless tobacco: Never    Tobacco comments:     \"STOP SMOKING\" PACKET GIVEN TO PATIENT   Substance Use Topics    Alcohol use: No     No Known Allergies   Current Facility-Administered Medications   Medication Dose Route Frequency    ondansetron (ZOFRAN) injection 4 mg  4 mg IntraVENous Q6H PRN    sodium chloride (NS) flush 5-40 mL  5-40 mL IntraVENous Q8H    sodium chloride (NS) flush 5-40 mL  5-40 mL IntraVENous PRN    acetaminophen (TYLENOL) tablet 650 mg  650 mg Oral Q6H PRN    Or    acetaminophen (TYLENOL) suppository 650 mg  650 mg Rectal Q6H PRN    polyethylene glycol (MIRALAX) packet 17 g  17 g Oral DAILY PRN    enoxaparin (LOVENOX) injection 30 mg  30 mg SubCUTAneous DAILY    lactated Ringers infusion  125 mL/hr IntraVENous CONTINUOUS    HYDROmorphone (DILAUDID) injection 1 mg  1 mg IntraVENous Q2H PRN          LAB AND IMAGING FINDINGS:     Lab Results   Component Value Date/Time    WBC 7.1 02/09/2023 06:16 PM    HGB 12.2 02/09/2023 06:16 PM    PLATELET 038 75/78/4901 06:16 PM     Lab Results   Component Value Date/Time    Sodium 127 (L) 02/09/2023 03:29 PM    Potassium 3.4 (L) 02/09/2023 03:29 PM    Chloride 87 (L) 02/09/2023 03:29 PM    CO2 29 02/09/2023 03:29 PM    BUN 37 (H) 02/09/2023 03:29 PM    Creatinine 2.56 (H) 02/09/2023 03:29 PM    Calcium 10.5 (H) 02/09/2023 03:29 PM    Magnesium 1.7 01/12/2019 04:05 AM    Phosphorus 2.0 (L) 01/12/2019 04:05 AM      Lab Results   Component Value Date/Time    Alk.  phosphatase 296 (H) 02/06/2023 07:52 AM    Protein, total 6.8 02/06/2023 07:52 AM    Albumin 2.8 (L) 02/06/2023 07:52 AM    Globulin 4.0 02/06/2023 07:52 AM     Lab Results   Component Value Date/Time    INR 1.1 02/22/2019 08:18 PM    Prothrombin time 10.8 02/22/2019 08:18 PM    aPTT 27.8 02/22/2019 08:18 PM      Lab Results   Component Value Date/Time    Iron 18 (L) 05/06/2022 11:13 AM    TIBC 173 (L) 05/06/2022 11:13 AM    Iron % saturation 10 (L) 05/06/2022 11:13 AM    Ferritin 426 (H) 05/06/2022 11:13 AM      No results found for: PH, PCO2, PO2  No components found for: GLPOC   No results found for: CPK, CKMB             Total time:  minutes

## 2023-02-10 NOTE — CONSULTS
[facility] at [address], [address]  W: [phone]  F: [phone]      Reason for Visit:   Ashwin Gomes is a 41 y.o. male who is seen for evaluation of Stage IV colon cancer with carcinomatosis. Hematology/Oncology Treatment History:     Diagnosis #1: Follicular lymphoma  Stage: IV  Pathology:   11/13/18 right inguinal LN excision: Follicular lymphoma, high-grade (grade 3a of 3). Prior Treatment:   1. Obinutuzumab-CHOP. Obinutuzumab: 1000 mg weekly on days 1, 8, 15 for cycle 1, then 1000 mg on day 1 q21 days for cycles 2-6, then monotherapy 1000 mg every 21 days for cycle 7, 8 with Cyclophosphamide 750mg/m2, Doxorubicin 50mg/m2, Vincristine 1.4mg/m2 on day 1 and Prednisone 100mg on Days 1-5, every 21 days for a total of 2 cycles completed late 1/2019. Regimen discontinued due to STEMI. 2. Obinutuzumab + Bendamustine: 1000 mg Obinutuzumab on day 1 + Bendamustine 90mg/m2 on days 1-2 on a 28-day cycle x 4 cycles, completed 5/2019  Current Treatment: Surveillance           Diagnosis #2: Colon cancer:  Stage: IV  Pathology:    2/19/22 Ascending colon; biopsy: poorly differentiated carcinoma  Comment: No dysplasia is identified. Immunohistochemical stains performed on block 1A, show the tumor positive for Cam5.2 and shows patchy positivity for CDX2 with a Ki-67 index of -90%; the tumor is focally positive for CK5/6 and GATA3; negative for p63, Pax8, CK7, CK20, panmelanoma, synaptophysin and chromogranin. The immunophenotypic features are nonspecific but argues somewhat against the following carcinoma: urothelial, neuroendocrine and breast. The immunophenotypic features also argues against melanoma and somewhat against classic colorectal carcinoma.   Additional immunohistochemical stains to exclude the possibility of prostate and hepatocellular carcinoma have been   ordered; the results will be reported as an addendum. -MLH1/MSH2/MSH6/PMS2 all intact with no loss of nuclear expression of MMR proteins - no MSI   Addendum: The addendum is issued to report the results of additional immunohistochemical stains in an attempt to ascertain the primary site of the carcinoma. The diagnosis is unchanged. Hepar and glypican 3 immunohistochemical stains are neg, arguing against hepatocellular carcinoma. PSA stain is negative, arguing against prostatic primary. Imaging studies to eval for poss primary sites maybe useful. In the absence of carcinoma/tumor at any other sites, this may represent an undifferentiated carcinoma of the colon.  -Oncogenomics (molecular) studies: POLE< ARID1A, YVONNE, ATR, BRCA2, CHECK1, FANCI, NF1, PIK3CA, PTCH1, PTEN, RAD50, RAD51, RB1, SMARCA4, STK11  -Neogenomics: KRAS exon 2 and exon 3 not detected, a VUS p. E98K is detected in Exon 4 of KRAS, PD-L1 28-8 (Opdivo) for gastric/GEJ/EAC no expression, PD-L1 (22C3 pharmDx) TPS 1% (pembrolizumab), BRAF mutation not detected, NRAS exon 2 not detected, NRAS 3 not detected, NRAS 4 not detected. Prior Treatment:   1. Loop ileostomy 2/19/22  2. Diagnostic laparoscopy with peritoneal biopsy, 4/27/22  3. FOLFOXIRI every 2 weeks until disease progression or toxicity, 5/16/22 - 12/2022. Current Treatment: FOLFIRI + Panitumumab (6 mg/kg) every 14 days, to start 1/9/23     Oncologic History:  Was in his usual state of health in early November 2018 when he developed low back pain and presented to the ER. Work-up at outside hospital revealed a retroperitoneal mass seen on CT imaging, and he was transferred to [facility] for further work-up. CT there showed a large retroperitoneal mass encircling the aorta with invasion of the left renal hilum and left adrenal gland. There were bilateral inguinal lymph nodes and moderate left hydronephrosis. He was evaluated while at [facility] and was noted to have palpable nodes in his groin for approximately the past 1 month. He underwent excisional LN biopsy of right inguinal LN, which revealed follicular lymphoma. At time of diagnosis, he had no cardiac disease aside from HTN, hyperlipidemia. However, he did have an NSTEMI after cycle 2 of O-CHOP. Was likely unrelated to chemotherapy, but opted to switch treatment to Obinutuzumab-Bendamustine. He completed treatment in 5/2019 and had a CR based on PET. History of Present Illness:   Francie Moore Sr. is a pleasant 41 y.o. male with metastatic colon cancer who was admitted on 2/10/23 for N/V, abdominal pain. Pt was evaluated in our outpatient infusion center [facility]) yesterday for N/V despite not having chemotherapy for 2.5 weeks. Given concern for bowel obstruction, patient was directed to ED for further eval. His n/v started Tuesday and he noted decrease in ostomy output since Monday. ED labs notable for Na 127, Cr 2.56. ED CT A/P imaging reveals small bowel obstruction with worsening of carcinomatosis. Pt reports feels \"miserable\". Rates pain 5/10. Verbalizing little; eyes closed. Pt was recently switched from FOLFIRINOX chemotherapy to FOLFIRI + Panitumumab approx 5 weeks ago. Had NG tube placed and no current vomiting. No family at bedside.      Past Medical History:   Diagnosis Date    Anxiety and depression     Cancer ([address] 02/2022    COLON    Chronic pain     lower back- lymphoma    Hyperlipidemia     Hypertension     NO MEDICATION FOR PAST 9 MONTHS    Lymphadenopathy 11/12/2018    Lymphoma, follicular ([address] 67/19/8349    chemo    Seizures ([address] CHILDHOOD    NEVER TOOK MEDICATION - \"GREW OUT OF THEM\"       Past Surgical History:   Procedure Laterality Date    HX ABDOMINAL LAPAROSCOPY  04/27/2022    Peritoneal biopsy Dr. Ken Cardenas  02/14/2019    LAD stent    HX ILEOSTOMY      HX OTHER SURGICAL Right 11/13/2018    inguinal lymph node excisional biopsy: high-grade follicular lymphoma    IR INJECTION PSEUDOANEURYSM  02/26/2019 TN UNLISTED PROCEDURE ABDOMEN PERITONEUM & OMENTUM  02/2022    COLON RESECTION WITH ILEOSTOMY    TN UNLISTED PROCEDURE CARDIAC SURGERY  2019    STENT X1       Social History     Socioeconomic History    Marital status:      Spouse name: Not on file    Number of children: 2    Years of education: 6    Highest education level: 11th grade   Occupational History     Comment: resturant manager,   Tobacco Use    Smoking status: Every Day     Packs/day: 0.50     Years: 20.00     Pack years: 10.00     Types: Cigarettes    Smokeless tobacco: Never    Tobacco comments:     \"STOP SMOKING\" PACKET GIVEN TO PATIENT   Vaping Use    Vaping Use: Never used   Substance and Sexual Activity    Alcohol use: No    Drug use: No    Sexual activity: Yes   Other Topics Concern     Service No    Blood Transfusions No    Caffeine Concern Yes    Occupational Exposure No    Hobby Hazards No    Sleep Concern No    Stress Concern No    Weight Concern No    Special Diet No    Back Care No    Exercise No    Bike Helmet No    Seat Belt No    Self-Exams No   Social History Narrative    Not on file     Social Determinants of Health     Financial Resource Strain: Not on file   Food Insecurity: Not on file   Transportation Needs: Not on file   Physical Activity: Not on file   Stress: Not on file   Social Connections: Not on file   Intimate Partner Violence: Not on file   Housing Stability: Not on file       Family History   Problem Relation Age of Onset    Hypertension Father     Diabetes Father     Cancer Father         PROSTATE    Diabetes Mother     No Known Problems Brother     No Known Problems Brother     Anesth Problems Neg Hx        Current Facility-Administered Medications   Medication Dose Route Frequency    ondansetron (ZOFRAN) injection 4 mg  4 mg IntraVENous Q6H PRN    sodium chloride (NS) flush 5-40 mL  5-40 mL IntraVENous Q8H    sodium chloride (NS) flush 5-40 mL  5-40 mL IntraVENous PRN    acetaminophen (TYLENOL) tablet 650 mg  650 mg Oral Q6H PRN    Or    acetaminophen (TYLENOL) suppository 650 mg  650 mg Rectal Q6H PRN    polyethylene glycol (MIRALAX) packet 17 g  17 g Oral DAILY PRN    enoxaparin (LOVENOX) injection 30 mg  30 mg SubCUTAneous DAILY    morphine injection 2 mg  2 mg IntraVENous Q4H PRN    lactated Ringers infusion  125 mL/hr IntraVENous CONTINUOUS       No Known Allergies       Review of Systems: A complete review of systems was obtained, reviewed. Pertinent findings reviewed above. Physical Exam:   Visit Vitals  BP (!) 159/99 (BP 1 Location: Left upper arm, BP Patient Position: At rest)   Pulse 91   Temp 98.1 °F (36.7 °C)   Resp 18   SpO2 96%     ECOG PS: 2-3  General: frail, cachectic;   Eyes: PERRLA, anicteric sclerae  HENT: NGT in place  Neck: supple  Lymphatic: no cervical, supraclavicular, axillary adenopathy  Respiratory: CTAB, normal respiratory effort  CV: normal rate, regular rhythm, no murmurs, no peripheral edema  GI: soft, nontender, nondistended. Ostomy noted with liquid brown drainage. MS: digits without clubbing or cyanosis. Skin: no rashes, no ecchymoses, no petechia. normal temperature, turgor, and texture. Psych: alert, oriented, appropriate affect, normal judgment/insight    Results:     Lab Results   Component Value Date/Time    WBC 7.1 02/09/2023 06:16 PM    HGB 12.2 02/09/2023 06:16 PM    HCT 36.1 (L) 02/09/2023 06:16 PM    PLATELET 754 35/05/8465 06:16 PM    MCV 92.8 02/09/2023 06:16 PM    ABS.  NEUTROPHILS 5.5 02/09/2023 06:16 PM    Hemoglobin (POC) 15.0 06/05/2009 02:13 PM    Hematocrit (POC) 39 02/14/2019 01:24 PM     Lab Results   Component Value Date/Time    Sodium 127 (L) 02/09/2023 03:29 PM    Potassium 3.4 (L) 02/09/2023 03:29 PM    Chloride 87 (L) 02/09/2023 03:29 PM    CO2 29 02/09/2023 03:29 PM    Glucose 153 (H) 02/09/2023 03:29 PM    BUN 37 (H) 02/09/2023 03:29 PM    Creatinine 2.56 (H) 02/09/2023 03:29 PM    GFR est AA >60 10/03/2022 07:50 AM    GFR est non-AA >60 10/03/2022 07:50 AM    Calcium 10.5 (H) 02/09/2023 03:29 PM    Sodium (POC) 136 02/14/2019 01:24 PM    Potassium (POC) 3.9 02/14/2019 01:24 PM    Chloride (POC) 102 02/14/2019 01:24 PM    Glucose (POC) 249 (H) 02/15/2019 10:21 PM    BUN (POC) 14 02/14/2019 01:24 PM    Creatinine (POC) 0.9 02/14/2019 01:24 PM    Calcium, ionized (POC) 1.24 02/14/2019 01:24 PM     Lab Results   Component Value Date/Time    Bilirubin, total 0.7 02/06/2023 07:52 AM    ALT (SGPT) 13 02/06/2023 07:52 AM    Alk. phosphatase 296 (H) 02/06/2023 07:52 AM    Protein, total 6.8 02/06/2023 07:52 AM    Albumin 2.8 (L) 02/06/2023 07:52 AM    Globulin 4.0 02/06/2023 07:52 AM     Lab Results   Component Value Date/Time    Iron % saturation 10 (L) 05/06/2022 11:13 AM    TIBC 173 (L) 05/06/2022 11:13 AM    Ferritin 426 (H) 05/06/2022 11:13 AM     02/15/2022 02:33 PM    Beta-2 Microglobulin, serum 2.5 (H) 12/28/2018 10:00 AM    TSH 1.53 09/28/2016 04:40 AM    Lipase 49 (L) 02/09/2023 06:16 PM    Hep C virus Ab Interp. NONREACTIVE 12/27/2018 04:53 PM    HIV 1/2 Interpretation NONREACTIVE 12/28/2018 10:00 AM       Lab Results   Component Value Date/Time    INR 1.1 02/22/2019 08:18 PM    aPTT 27.8 02/22/2019 08:18 PM      Lab Results   Component Value Date/Time    CEA 43.2 01/09/2023 08:09 AM     02/15/2022 02:33 PM    HCG, beta, QT <1 11/10/2018 03:41 AM    AFP, Serum, Tumor Marker 4.1 11/10/2018 03:41 AM      2/9/2023 CT A/P WO CONT  IMPRESSION  Small bowel obstruction with worsening of carcinomatosis. 2/10/23 XR ABD   IMPRESSION: Nasogastric tube appears to be in satisfactory position. Test results above have been reviewed. Assessment/Recommendations:       Undifferentiated carcinoma of transverse colon, metastatic:   SUZANNE, PDL1 1%, BRAF/NRAS negative, possibly KRAS wild-type  S/p diverting ileostomy due to large bowel obstruction. Colonoscopy with biopsy on 2/25/22.  No obvious metastatic disease on CT imaging, but did have obvious metastatic disease to peritoneum during laparoscopy with Dr. Severo Laws. Initially treated with  FOLFOXIRI every 2 weeks. CT after C9 showed good response with decrease in size of colon mass, but increased peritoneal implants. Current treatment with second line therapy  FOLFIRI + Panitumumab every 2 weeks. 2/6/23  C3 FOLFIRI + Panitumumab delayed due to neutropenia; rescheduled for 2/13/22 . Unfortunately scans now with small bowel obstruction with worsening carcinomatosis. I discussed with patient that SBO in the setting metastatic cancer, especially carcinomatosis, is quite difficult to treat. It is bound to be recurrent and is unlikely to respond to any treatments. I discussed that if bowel rest, IVF, pain medications and antiemetics do not result in bowels moving again, he will need to consider hospice. -- Continue IVF, bowel rest, supportive pain medications, stool softeners, antiemetics. -- Continue NGT decompression  -- Appreciate hospital medicine and surgery teams as well as palliative care. 2. Small bowel obstruction / ALMA:  General surgery following and pt is not a great candidate for surgery. No discrete transition point suspected. ALMA 2/2 decreased PO intake, n/v\.  -- Supportive care with NGT, IVF, bowel rest.    3. Chronic lumbar back pain / anxiety / RLQ / acute right upper quadrant and umbilical pain:   Left lower back pain is chronic/stable. RLQ is likely related to neoplasm/colostomy/parastomal hernia. Evaluated by Dr. Severo Laws who believes umbilical burning pain is due to tumor implants. -- Follows  with Dr. Carole Acosta palliative outpatient   -- Following with Dr. Fatou Whitt and receiving 3-4 weeks of daily palliative radiation to tumor implants. 4. H/o Follicular lymphoma:   Completed treatment 5/2019. End of treatment PET 6/3/19 showed CR. No extra surveillance needed at this time given metastatic colon cancer with frequent imaging.    Current imaging shows slight increase in soft tissue density in deep pelvis on 10/2022. Will continue to monitor for colon cancer follow up imaging. 5. CAD / HTN:   h/o STEMI 2/14/19 with TOM placed to proximal LAD. Follows with Dr. Beckie Linares. On ASA, Lipitor. 6. Goals of care:   Disease is incurable and will be difficult to treat if pt has recurrent SBO. Palliative team following. I personally saw and evaluated the patient and performed the key components of medical decision making. The history, physical exam, and documentation were performed by Charity Velez NP. I reviewed and verified the above documentation and modified it as needed. Pt is unfortunately suffering with SBO related to peritoneal carcinomatosis. He has already had disease progression on FOLFIRINOX and FOLFIRI + Bevacizumab. Although immunotherapy was a potential future treatment option, I worry this will not work quickly. Discussed goals of care and will see whether SBO improves with supportive care, bowel rest, but poor prognosis.      Signed By: Jeffrey Medina MD

## 2023-02-10 NOTE — PROGRESS NOTES
General Surgery    Called for small bowel obstruction. Reviewed CT scan: worsening carcinomatosis, significantly distended stomach and multiple loops of small bowel, parastomal hernia does not appear to be the cause of the obstruction. Plan:  -- Recommend hospitalist admit. -- NGT, given significantly distended stomach and multiple loops of small bowel. Full consult to follow. Thank you for the opportunity to participate in this patient's care. Beckie Johnson.  Juan C Bang MD, FACS

## 2023-02-10 NOTE — PROGRESS NOTES
Problem: Falls - Risk of  Goal: *Absence of Falls  Description: Document Talha Gomez Fall Risk and appropriate interventions in the flowsheet.   Outcome: Progressing Towards Goal  Note: Fall Risk Interventions:                                Problem: Patient Education: Go to Patient Education Activity  Goal: Patient/Family Education  Outcome: Progressing Towards Goal

## 2023-02-10 NOTE — PROGRESS NOTES
Reason for Admission:   Small bowel obstruction                  RUR Score:    16%              PCP: First and Last name:   Jabari Hardy MD   Name of Practice:    Are you a current patient: Yes/No:    Approximate date of last visit: over a year ago   Can you participate in a virtual visit if needed:     Do you (patient/family) have any concerns for transition/discharge? Patient did not express any issues or concerns. Plan for utilizing home health:   Has had in the past    Current Advanced Directive/Advance Care Plan:  Full Code    Healthcare Decision Maker:   Primary Decision Maker: Wilfrid Obando, spouse - [phone]            Transition of Care Plan:     Patient lives with his spouse and stepson at charted address, a one story house and no entry steps. Patient has stage 4 colon cancer and goes to the [facility] center; oncologist is Dr. Taylor Maya. Patient reports being independent with self care but wife is available to provide assistance. He has had home health in the past but is unable to recall the provider. Patient does not use any DME. His preferred pharmacy is [facility] on [address]. CM following  Await further evaluation and response to treatment  General surgery consulted  Outpatient follow-up  Wife will provide transport at discharge    Care Management Interventions  PCP Verified by CM:  Yes  Support Systems: Spouse/Significant Other  Confirm Follow Up Transport: Family  The Plan for Transition of Care is Related to the Following Treatment Goals : SBO  Discharge Location  Patient Expects to be Discharged to[de-identified] Home with family assistance     Alondra Edge LCSW

## 2023-02-10 NOTE — PROGRESS NOTES
Palliative Medicine      Code Status: Full Code    Advance Care Planning:  Advance Care Planning 2/10/2023   Patient's Healthcare Decision Maker is: Legal Next of Kin   Confirm Advance Directive Not on file   Patient Would Like to Complete Advance Directive Pt was not alert enough for discussion. Does the patient have other document types Not on file     Patient / Family Encounter Documentation    Participants (names): Pt, Palliative Medicine (NP [place] Tiarra)    Narrative: Pt was resting in bed, roused when greeted but remained too lethargic and/or withdrawn to engage in meaningful conversation. No family present. Per review of chart, pt is , has two children over the age of 25 with ex-wife, has a toddler with current girlfriend, Ivone Saab. Pt's mother  unexpectedly when pt was 15 yrs old, father  in 2019. Pt had been living with father for a time prior to his death. Pt previously worked as a cook and a ; however, employment has been a challenge due to medical issues. Pt was diagnosed with colon cancer in 2018, has been followed in Palliative Medicine clinic since 2019. Pt does not have AMD on file, was too drowsy to complete at time of visit. In absence of verified Medical POA, pt's adult children are legal NOK/surrogate decision makers; however, pt indicated that girlfriend would be the person he might prefer to serve in that role. Blank copy of AMD was left for pt to review when he is more alert. Psychosocial Issues Identified/ Resilience Factors:  Coping with advanced disease and related symptoms, hx of financial concerns, hx of loss. No spiritual concerns identified at time of visit; Chaplains are available for support. Caregiver [place]: Pt is reportedly independent at home. Does the caregiver feel confident administering medication? N/a  Does the caregiver need any help connecting with community resources?  N/a  Does the caregiver feel confident assisting with activities of daily living? N/a    Goals of Care / Plan:  Palliative team will plan to follow up when pt is more alert, will attempt to complete AMD if pt is receptive. Discussed with Oncology NP. Thank you for including Palliative Medicine in the care of Mr. Justice Dela Cruz.     Nickolas Georges LCSW, [facility]  [phone])

## 2023-02-10 NOTE — ED PROVIDER NOTES
This is a 39yo male with h/o colon cancer and lymphoma who presented late yesterday to the ED and was found to have SBO, and subsequently left AMA because he \"had some things to do\" returns for admission. Having nausea, vomiting, abdominal pain for 1 week. Symptoms worsening. Earlier workup revealing hyponat, hypokal and ARF. He left AMA before receiving any fluids or electrolytes. No fever or chills, no constip or diarrhea. No blood in stool or vomit. No CP or SOB. No other complaints. HE states the pain is relatively mild. His main complaint is nausea.        Past Medical History:   Diagnosis Date    Anxiety and depression     Cancer ([facility] [address] 02/2022    COLON    Chronic pain     lower back- lymphoma    Hyperlipidemia     Hypertension     NO MEDICATION FOR PAST 9 MONTHS    Lymphadenopathy 11/12/2018    Lymphoma, follicular ([facility] [address] 68/33/9625    chemo    Seizures ([facility] [address] CHILDHOOD    NEVER TOOK MEDICATION - \"GREW OUT OF THEM\"       Past Surgical History:   Procedure Laterality Date    HX ABDOMINAL LAPAROSCOPY  04/27/2022    Peritoneal biopsy Dr. Christian Rodriguez    HX COLONOSCOPY      HX HEART CATHETERIZATION  02/14/2019    LAD stent    HX ILEOSTOMY      HX OTHER SURGICAL Right 11/13/2018    inguinal lymph node excisional biopsy: high-grade follicular lymphoma    IR INJECTION PSEUDOANEURYSM  02/26/2019    CT UNLISTED PROCEDURE ABDOMEN PERITONEUM & OMENTUM  02/2022    COLON RESECTION WITH ILEOSTOMY    CT UNLISTED PROCEDURE CARDIAC SURGERY  2019    STENT X1         Family History:   Problem Relation Age of Onset    Hypertension Father     Diabetes Father     Cancer Father         PROSTATE    Diabetes Mother     No Known Problems Brother     No Known Problems Brother     Anesth Problems Neg Hx        Social History     Socioeconomic History    Marital status:      Spouse name: Not on file    Number of children: 2    Years of education: 11    Highest education level: 11th grade   Occupational History     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,   Tobacco Use    Smoking status: Every Day     Packs/day: 0.50     Years: 20.00     Pack years: 10.00     Types: Cigarettes    Smokeless tobacco: Never    Tobacco comments:     \"STOP SMOKING\" PACKET GIVEN TO PATIENT   Vaping Use    Vaping Use: Never used   Substance and Sexual Activity    Alcohol use: No    Drug use: No    Sexual activity: Yes   Other Topics Concern     Service No    Blood Transfusions No    Caffeine Concern Yes    Occupational Exposure No    Hobby Hazards No    Sleep Concern No    Stress Concern No    Weight Concern No    Special Diet No    Back Care No    Exercise No    Bike Helmet No    Seat Belt No    Self-Exams No   Social History Narrative    Not on file     Social Determinants of Health     Financial Resource Strain: Not on file   Food Insecurity: Not on file   Transportation Needs: Not on file   Physical Activity: Not on file   Stress: Not on file   Social Connections: Not on file   Intimate Partner Violence: Not on file   Housing Stability: Not on file         ALLERGIES: Patient has no known allergies. Review of Systems in HPI    Vitals:    02/10/23 0201   BP: (!) 131/96   Pulse: (!) 115   Resp: 16   Temp: 97.9 °F (36.6 °C)   SpO2: 97%            Physical Exam  Constitutional:       General: He is not in acute distress. Appearance: He is ill-appearing. He is not toxic-appearing. Cardiovascular:      Rate and Rhythm: Normal rate and regular rhythm. Heart sounds: Normal heart sounds. Pulmonary:      Effort: Pulmonary effort is normal.      Breath sounds: Normal breath sounds. Abdominal:      General: There is distension. Palpations: There is mass. Tenderness: There is generalized abdominal tenderness. There is no right CVA tenderness or left CVA tenderness. Skin:     General: Skin is warm. Neurological:      General: No focal deficit present. Mental Status: He is alert and oriented to person, place, and time.         Medical Decision Making  39yo male returning to ED after leaving AMA, with SBO and ARF 2/2 IVVD 2/2 decreased PO intake. Will insert NGT and admit for conservative management per previous surg recs. LR bolus to manage ARF (and resulting electrolyte anomalies) as this is likely d/t hypovolemia (prerenal). Zofran for nausea. Amount and/or Complexity of Data Reviewed  External Data Reviewed: labs and radiology. Details: CT abd, CBC, CMP from previous admission           Procedures      Perfect Serve Consult for Admission  2:49 AM    ED Room Number: ER16/16  Patient Name and age:  Sanjuana Cleary. 41 y.o.  male  Working Diagnosis:   1. SBO (small bowel obstruction) ([facility] [address]    2. Acute renal failure, unspecified acute renal failure type ([address]        COVID-19 Suspicion:  no  Sepsis present:  no  Reassessment needed: no  Code Status:  Full Code  Readmission: yes  Isolation Requirements:  no  Recommended Level of Care:  remote [facility]  Department: [facility] - [phone]    Other: 39yo male here with SBO and ARF 2/2 IVVD 2/2 decreased PO intake. Will insert NGT and admit to gen surg for conservative management. LR bolus to manage ARF (and resulting electrolyte anomalies) as this is likely d/t hypovolemia (prerenal).

## 2023-02-10 NOTE — ED NOTES
TRANSFER - OUT REPORT:    Verbal report given to Nelly RN on [facility] [place].  being transferred to  for routine progression of care       Report consisted of patients Situation, Background, Assessment and   Recommendations(SBAR). Information from the following report(s) SBAR, Kardex, ED Summary, Procedure Summary, Intake/Output, MAR, Recent Results, and Cardiac Rhythm    was reviewed with the receiving nurse. Lines:   Venous Access Device Bard PowerPort 12/20/18 Upper chest (subclavicular area, right (Active)        Opportunity for questions and clarification was provided.       Patient transported with:   Monitor  Registered Nurse

## 2023-02-10 NOTE — PROGRESS NOTES
Spiritual Care Assessment/Progress Note  [facility]      NAME: Fredy Neves Sr. MRN: [id number]  AGE: 41 y.o. SEX: male  Islam Affiliation: Catholic   Language: English     2/10/2023     Total Time (in minutes): 17     Spiritual Assessment begun in [facility] POST SURG ORT 1 through conversation with:         []Patient        [] Family    [] Friend(s)        Reason for Consult: Initial visit     Spiritual beliefs: (Please include comment if needed)     [] Identifies with a michelle tradition:         [] Supported by a michelle community:            [] Claims no spiritual orientation:           [] Seeking spiritual identity:                [] Adheres to an individual form of spirituality:           [x] Not able to assess:                           Identified resources for coping:      [] Prayer                               [] Music                  [] Guided Imagery     [] Family/friends                 [] Pet visits     [] Devotional reading                         [x] Unknown     [] Other:                                               Interventions offered during this visit: (See comments for more details)                Plan of Care:     [x] Support spiritual and/or cultural needs    [] Support AMD and/or advance care planning process      [x] Support grieving process   [] Coordinate Rites and/or Rituals    [] Coordination with community clergy   [] No spiritual needs identified at this time   [] Detailed Plan of Care below (See Comments)  [] Make referral to Music Therapy  [] Make referral to Pet Therapy     [] Make referral to Addiction services  [] Make referral to [facility]  [] Make referral to Spiritual Care Partner  [] No future visits requested        [] Contact Spiritual Care for further referrals     Comments: Visit for spiritual assessment: Unable to assess. Graham Valentine appeared to be resting and did not awake when called. Chart review.  Provided ministry of presence and silent prayer. Please contact spiritual [facility] for future services. [address] [facility]  Tyler Turner, [address] [place] [address] Road paging Service [phone])

## 2023-02-10 NOTE — PROGRESS NOTES
Hospitalist Progress Note              Irena Reyes MD.                                                             Cell: [phone]                               NAME:  Sharonda Stack Sr.  :  1975  MRN:  [id number]  Date of Service:  2/10/2023    Summary: 41 y.o. male   The patient is a 42 y/o  male with PMH follicular lymphoma s/p chemotherapy currently under active surveillance and colon cancer w/ poorly differentiated adenocarcinoma s/p loop ileostomy (22) and undergoing tx w/ FOLFIRI who comes in w/ symptoms of worsening nausea and vomiting that has been on-going since Tuesday. He notes at least 5 bilious episodes per day. He has chronic abdominal pain due to his cancer history but denies any new or worsening symptoms. He has not had any recent significant weight loss or decreased stool output. He does report of small liquid bowel movement earlier on in the day. He has no fever, chills or night sweats. He has no chest pain, palpitations, coughing, wheezing or dyspnea. Assessment/Plan:  Small bowel obstruction: this is due to stage 4 colon cancer with peritoneal carcinomatosis  NG tube passed draining bilious fluid  Continue to keep NPO, continue NG tube drainage connected to Low continous sunction  Surgical consult pending    Stage 4 colon cancer, follicular lymphoma  Consult oncology   Currently on FOLFIRI and panitumumab    ALMA: Secondary to N/V and IVVD  Scr 2.56 from 1.08  Continue ringers lactate    Hyponatremia: due to hypovolemia.  Continue IVF    Consult palliative care to address AMD and care decisions     Code status:full  DVT prophylaxsis:SCD  Dispo: pending         Interval History/Subjective:  F/up for small bowel obstruction  Complain of gen abdominal pain but mild  NG tube draining bilious fluid    Review of Systems:  A comprehensive review of systems was negative except for that written in the HPI.      Objective:     VITALS:   Last 24hrs VS reviewed since prior progress note. Most recent are:  Visit Vitals  BP (!) 159/99 (BP 1 Location: Left upper arm, BP Patient Position: At rest)   Pulse 91   Temp 98.1 °F (36.7 °C)   Resp 18   SpO2 96%       Intake/Output Summary (Last 24 hours) at 2/10/2023 1408  Last data filed at 2/10/2023 1213  Gross per 24 hour   Intake 229.17 ml   Output 600 ml   Net -370.83 ml        PHYSICAL EXAM:  General:chronically ill looking  EENT: NG tube with billious fluid  Resp: CTA bilaterally. No wheezing/rhonchi/rales. No accessory muscle use  CV: Regular rhythm, normal rate, no murmurs, gallops, rubs  GI: Soft, non distended, mild gen tenderness. No bowel sound heard  no hepatosplenomegaly. Ileostomy pink w/ succus in the bag   Extremities: No edema, warm, 2+ pulses throughout  Neurologic: Moves all extremities. AAOx3, CN II-XII grossly intact  Psych: Good insight. Not anxious nor agitated. Skin: Good Turgor, no rashes or ulcers    Lab Data Personally Reviewed: (see below)     Medications list Personally Reviewed:  x YES  NO     _______________________________________________________________________  Care Plan discussed with:  Patient/Family, Nurse, and     Total NON critical care TIME:  30 minutes    Laureen Hutchins MD     Procedures: see electronic medical records for all procedures/Xrays and details which were not copied into this note but were reviewed prior to creation of Plan. LABS:  Recent Labs     02/09/23  1816   WBC 7.1   HGB 12.2   HCT 36.1*        Recent Labs     02/09/23  1529   *   K 3.4*   CL 87*   CO2 29   BUN 37*   CREA 2.56*   *   CA 10.5*     Recent Labs     02/09/23  1816   LPSE 49*     No results for input(s): INR, PTP, APTT, INREXT in the last 72 hours. No results for input(s): FE, TIBC, PSAT, FERR in the last 72 hours.    No results found for: FOL, RBCF   No results for input(s): PH, PCO2, PO2 in the last 72 hours. No results for input(s): CPK, CKNDX, TROIQ in the last 72 hours.     No lab exists for component: CPKMB  Lab Results   Component Value Date/Time    Cholesterol, total 170 09/28/2016 04:40 AM    HDL Cholesterol 23 09/28/2016 04:40 AM    LDL, calculated 96.4 09/28/2016 04:40 AM    Triglyceride 253 (H) 09/28/2016 04:40 AM    CHOL/HDL Ratio 7.4 (H) 09/28/2016 04:40 AM     Lab Results   Component Value Date/Time    Glucose (POC) 249 (H) 02/15/2019 10:21 PM    Glucose (POC) 121 (H) 02/14/2019 01:24 PM    Glucose (POC) 98 06/05/2009 02:13 PM     Lab Results   Component Value Date/Time    Color YELLOW/STRAW 02/13/2022 10:52 PM    Appearance CLEAR 02/13/2022 10:52 PM    Specific gravity 1.014 02/13/2022 10:52 PM    Specific gravity 1.010 02/15/2019 02:15 AM    pH (UA) 6.0 02/13/2022 10:52 PM    Protein Negative 02/13/2022 10:52 PM    Glucose Negative 02/13/2022 10:52 PM    Ketone Negative 02/13/2022 10:52 PM    Bilirubin Negative 02/13/2022 10:52 PM    Urobilinogen 0.2 02/13/2022 10:52 PM    Nitrites Negative 02/13/2022 10:52 PM    Leukocyte Esterase Negative 02/13/2022 10:52 PM    Epithelial cells FEW 02/13/2022 10:52 PM    Bacteria Negative 02/13/2022 10:52 PM    WBC 0-4 02/13/2022 10:52 PM    RBC 0-5 02/13/2022 10:52 PM

## 2023-02-10 NOTE — PROGRESS NOTES
Problem: Falls - Risk of  Goal: *Absence of Falls  Description: Document Karma Novakangelina Fall Risk and appropriate interventions in the flowsheet.   Outcome: Progressing Towards Goal  Note: Fall Risk Interventions:                                Problem: Patient Education: Go to Patient Education Activity  Goal: Patient/Family Education  Outcome: Progressing Towards Goal

## 2023-02-11 LAB
ANION GAP SERPL CALC-SCNC: 7 MMOL/L (ref 5–15)
BUN SERPL-MCNC: 41 MG/DL (ref 6–20)
BUN/CREAT SERPL: 37 (ref 12–20)
CALCIUM SERPL-MCNC: 8.8 MG/DL (ref 8.5–10.1)
CHLORIDE SERPL-SCNC: 97 MMOL/L (ref 97–108)
CO2 SERPL-SCNC: 27 MMOL/L (ref 21–32)
CREAT SERPL-MCNC: 1.11 MG/DL (ref 0.7–1.3)
GLUCOSE SERPL-MCNC: 102 MG/DL (ref 65–100)
POTASSIUM SERPL-SCNC: 3.6 MMOL/L (ref 3.5–5.1)
SODIUM SERPL-SCNC: 131 MMOL/L (ref 136–145)

## 2023-02-11 PROCEDURE — 74011250636 HC RX REV CODE- 250/636: Performed by: NURSE PRACTITIONER

## 2023-02-11 PROCEDURE — 36415 COLL VENOUS BLD VENIPUNCTURE: CPT

## 2023-02-11 PROCEDURE — 74011000250 HC RX REV CODE- 250: Performed by: INTERNAL MEDICINE

## 2023-02-11 PROCEDURE — 74011250636 HC RX REV CODE- 250/636: Performed by: INTERNAL MEDICINE

## 2023-02-11 PROCEDURE — 65270000029 HC RM PRIVATE

## 2023-02-11 PROCEDURE — 80048 BASIC METABOLIC PNL TOTAL CA: CPT

## 2023-02-11 RX ORDER — ENOXAPARIN SODIUM 100 MG/ML
40 INJECTION SUBCUTANEOUS DAILY
Status: DISCONTINUED | OUTPATIENT
Start: 2023-02-12 | End: 2023-02-14 | Stop reason: HOSPADM

## 2023-02-11 RX ADMIN — HYDROMORPHONE HYDROCHLORIDE 1 MG: 1 INJECTION, SOLUTION INTRAMUSCULAR; INTRAVENOUS; SUBCUTANEOUS at 12:39

## 2023-02-11 RX ADMIN — HYDROMORPHONE HYDROCHLORIDE 1 MG: 1 INJECTION, SOLUTION INTRAMUSCULAR; INTRAVENOUS; SUBCUTANEOUS at 15:53

## 2023-02-11 RX ADMIN — SODIUM CHLORIDE, PRESERVATIVE FREE 10 ML: 5 INJECTION INTRAVENOUS at 06:06

## 2023-02-11 RX ADMIN — ENOXAPARIN SODIUM 30 MG: 100 INJECTION SUBCUTANEOUS at 08:15

## 2023-02-11 RX ADMIN — SODIUM CHLORIDE, POTASSIUM CHLORIDE, SODIUM LACTATE AND CALCIUM CHLORIDE 125 ML/HR: 600; 310; 30; 20 INJECTION, SOLUTION INTRAVENOUS at 16:01

## 2023-02-11 RX ADMIN — HYDROMORPHONE HYDROCHLORIDE 1 MG: 1 INJECTION, SOLUTION INTRAMUSCULAR; INTRAVENOUS; SUBCUTANEOUS at 03:50

## 2023-02-11 RX ADMIN — HYDROMORPHONE HYDROCHLORIDE 1 MG: 1 INJECTION, SOLUTION INTRAMUSCULAR; INTRAVENOUS; SUBCUTANEOUS at 00:09

## 2023-02-11 RX ADMIN — HYDROMORPHONE HYDROCHLORIDE 1 MG: 1 INJECTION, SOLUTION INTRAMUSCULAR; INTRAVENOUS; SUBCUTANEOUS at 20:41

## 2023-02-11 RX ADMIN — SODIUM CHLORIDE, POTASSIUM CHLORIDE, SODIUM LACTATE AND CALCIUM CHLORIDE 125 ML/HR: 600; 310; 30; 20 INJECTION, SOLUTION INTRAVENOUS at 08:19

## 2023-02-11 RX ADMIN — HYDROMORPHONE HYDROCHLORIDE 1 MG: 1 INJECTION, SOLUTION INTRAMUSCULAR; INTRAVENOUS; SUBCUTANEOUS at 08:19

## 2023-02-11 NOTE — PROGRESS NOTES
Problem: Falls - Risk of  Goal: *Absence of Falls  Description: Document Talha Gomez Fall Risk and appropriate interventions in the flowsheet. Outcome: Progressing Towards Goal  Note: Fall Risk Interventions:                                Problem: Patient Education: Go to Patient Education Activity  Goal: Patient/Family Education  Outcome: Progressing Towards Goal     Problem: Pressure Injury - Risk of  Goal: *Prevention of pressure injury  Description: Document Asim Scale and appropriate interventions in the flowsheet. Outcome: Progressing Towards Goal  Note: Pressure Injury Interventions:             Activity Interventions: Increase time out of bed         Nutrition Interventions: Document food/fluid/supplement intake                     Problem: Patient Education: Go to Patient Education Activity  Goal: Patient/Family Education  Outcome: Progressing Towards Goal     Problem: Small Bowel Obstruction: Day 1  Goal: Off Pathway (Use only if patient is Off Pathway)  Outcome: Progressing Towards Goal  Goal: Activity/Safety  Outcome: Progressing Towards Goal  Goal: Consults, if ordered  Outcome: Progressing Towards Goal  Goal: Diagnostic Test/Procedures  Outcome: Progressing Towards Goal  Goal: Nutrition/Diet  Outcome: Progressing Towards Goal  Goal: Medications  Outcome: Progressing Towards Goal  Goal: Respiratory  Outcome: Progressing Towards Goal  Goal: Treatments/Interventions/Procedures  Outcome: Progressing Towards Goal  Goal: Psychosocial  Outcome: Progressing Towards Goal  Goal: *Optimal pain control at patient's stated goal  Outcome: Progressing Towards Goal  Goal: *Adequate urinary output (equal to or greater than 30 milliliters/hour)  Outcome: Progressing Towards Goal  Goal: *Hemodynamically stable  Outcome: Progressing Towards Goal  Goal: *Demonstrates progressive activity  Outcome: Progressing Towards Goal  Goal: *Absence of nausea/vomiting  Outcome: Progressing Towards Goal

## 2023-02-11 NOTE — PROGRESS NOTES
Hospitalist Progress Note              Jordan De Dios MD.                                                             Cell: [phone]                               NAME:  Yousuf Hutton Sr.  :  1975  MRN:  [id number]  Date of Service:  2023    Summary: 41 y.o. male   The patient is a 40 y/o  male with PMH follicular lymphoma s/p chemotherapy currently under active surveillance and colon cancer w/ poorly differentiated adenocarcinoma s/p loop ileostomy (22) and undergoing tx w/ FOLFIRI who comes in w/ symptoms of worsening nausea and vomiting that has been on-going since Tuesday. He notes at least 5 bilious episodes per day. He has chronic abdominal pain due to his cancer history but denies any new or worsening symptoms. He has not had any recent significant weight loss or decreased stool output. He does report of small liquid bowel movement earlier on in the day. He has no fever, chills or night sweats. He has no chest pain, palpitations, coughing, wheezing or dyspnea. Assessment/Plan:  Small bowel obstruction: this is due to stage 4 colon cancer with peritoneal carcinomatosis  NG tube removed. No vomiting. No abdominal distension  Continue conservative management with IVF, antiemetics  Continue NPO status with ICE chips. Check KUB tomorrow  Management as per surgery    Stage 4 colon cancer, follicular lymphoma  S/p chemotherapy and immune therapy  Oncology is following    ALMA: Secondary to N/V and IVVD  Scr 2.56 from 1.08  This is resolved  Continue gentle IVF    Hyponatremia: due to hypovolemia. This is improving   Continue IVF     Code status:full  DVT prophylaxsis:SCD  Dispo: pending         Interval History/Subjective:  F/up for small bowel obstruction  Off NG tube. C/o mild nausea and abdominal pain.  No vomiting    Review of Systems:  A comprehensive review of systems was negative except for that written in the HPI.      Objective:     VITALS:   Last 24hrs VS reviewed since prior progress note. Most recent are:  Visit Vitals  BP (!) 161/95 (BP 1 Location: Right upper arm, BP Patient Position: Lying; At rest)   Pulse 87   Temp 97.9 °F (36.6 °C)   Resp 16   SpO2 98%       Intake/Output Summary (Last 24 hours) at 2/11/2023 1416  Last data filed at 2/11/2023 1401  Gross per 24 hour   Intake 2556.25 ml   Output 1825 ml   Net 731.25 ml          PHYSICAL EXAM:  General:chronically ill looking  EENT: NG tube with billious fluid  Resp: CTA bilaterally. No wheezing/rhonchi/rales. No accessory muscle use  CV: Regular rhythm, normal rate, no murmurs, gallops, rubs  GI: Soft, non distended, mild gen tenderness. +ve bowel sound heard  no hepatosplenomegaly. Ileostomy with fecal matter  Extremities: No edema, warm, 2+ pulses throughout  Neurologic: Moves all extremities. AAOx3, CN II-XII grossly intact  Psych: Good insight. Not anxious nor agitated. Skin: Good Turgor, no rashes or ulcers    Lab Data Personally Reviewed: (see below)     Medications list Personally Reviewed:  x YES  NO     _______________________________________________________________________  Care Plan discussed with:  Patient/Family, Nurse, and     Total NON critical care TIME:  30 minutes    Katt Guerra MD     Procedures: see electronic medical records for all procedures/Xrays and details which were not copied into this note but were reviewed prior to creation of Plan. LABS:  Recent Labs     02/09/23  1816   WBC 7.1   HGB 12.2   HCT 36.1*          Recent Labs     02/11/23  0236 02/09/23  1529   * 127*   K 3.6 3.4*   CL 97 87*   CO2 27 29   BUN 41* 37*   CREA 1.11 2.56*   * 153*   CA 8.8 10.5*       Recent Labs     02/09/23  1816   LPSE 49*       No results for input(s): INR, PTP, APTT, INREXT, INREXT in the last 72 hours. No results for input(s): FE, TIBC, PSAT, FERR in the last 72 hours.    No results found for: FOL, RBCF No results for input(s): PH, PCO2, PO2 in the last 72 hours. No results for input(s): CPK, CKNDX, TROIQ in the last 72 hours.     No lab exists for component: CPKMB  Lab Results   Component Value Date/Time    Cholesterol, total 170 09/28/2016 04:40 AM    HDL Cholesterol 23 09/28/2016 04:40 AM    LDL, calculated 96.4 09/28/2016 04:40 AM    Triglyceride 253 (H) 09/28/2016 04:40 AM    CHOL/HDL Ratio 7.4 (H) 09/28/2016 04:40 AM     Lab Results   Component Value Date/Time    Glucose (POC) 249 (H) 02/15/2019 10:21 PM    Glucose (POC) 121 (H) 02/14/2019 01:24 PM    Glucose (POC) 98 06/05/2009 02:13 PM     Lab Results   Component Value Date/Time    Color YELLOW/STRAW 02/13/2022 10:52 PM    Appearance CLEAR 02/13/2022 10:52 PM    Specific gravity 1.014 02/13/2022 10:52 PM    Specific gravity 1.010 02/15/2019 02:15 AM    pH (UA) 6.0 02/13/2022 10:52 PM    Protein Negative 02/13/2022 10:52 PM    Glucose Negative 02/13/2022 10:52 PM    Ketone Negative 02/13/2022 10:52 PM    Bilirubin Negative 02/13/2022 10:52 PM    Urobilinogen 0.2 02/13/2022 10:52 PM    Nitrites Negative 02/13/2022 10:52 PM    Leukocyte Esterase Negative 02/13/2022 10:52 PM    Epithelial cells FEW 02/13/2022 10:52 PM    Bacteria Negative 02/13/2022 10:52 PM    WBC 0-4 02/13/2022 10:52 PM    RBC 0-5 02/13/2022 10:52 PM

## 2023-02-11 NOTE — PROGRESS NOTES
Progress Note    Patient: Shey Beckham Sr. MRN: [id number]  SSN: [id number]    YOB: 1975  Age: 41 y.o. Sex: male      Admit Date: 2/10/2023    * No surgery found *    Procedure:      Subjective:     Patient has no new complaints. Having stool via colostomy. Minimal NGT output. Objective:     Visit Vitals  BP (!) 149/95   Pulse 92   Temp 97.8 °F (36.6 °C)   Resp 17   SpO2 97%       Temp (24hrs), Av °F (36.7 °C), Min:97.8 °F (36.6 °C), Max:98.2 °F (36.8 °C)      Physical Exam:    ABDOMEN: soft, nondistended, nontender    Data Review: images and reports reviewed    Lab Review: All lab results for the last 24 hours reviewed.     Assessment:     Hospital Problems  Date Reviewed: 2023            Codes Class Noted POA    [facility] ICD-10-CM: Q58.854  ICD-9-CM: 560.9  2/10/2023 Unknown           Plan/Recommendations/Medical Decision Making:     SBO, appears to be resolving  WIll remove NGT and allow sips/chips  OOB

## 2023-02-11 NOTE — PROGRESS NOTES
[facility] Lovenox Dosing 02/11/23  Lovenox dose change per protocol  Physician: Dr Melva Gomez    SELIN [facility] Protocol  Enoxaparin prophylaxis dosing (medically ill, surgical patients)   Patient Weight (kg)     50 and below 51 - 100 101 - 150 151 - 174 175 or greater         Estimated CrCl  (ml/min) 30 or greater   30 mg SUBQ daily   40 mg SUBQ daily (or 30 mg BID for ortho) 30 mg SUBQ BID  40 mg SUBQ BID 60mg SUBQ BID      15-29 UFH 5000 units SUBQ BID   30 mg SUBQ daily 30 mg SUBQ daily 40 mg SUBQ daily   60 mg SUBQ daily      Less than 15 or Dialysis UFH 5000 units SUBQ BID   UFH 5000 units SUBQ TID UFH 7500 units SUBQ TID     Estimated Creatinine Clearance: 64.2 mL/min (based on SCr of 1.11 mg/dL).   Current dose: Lovenox 30 mg sc daily  Recommendation: Vovenox 40 mg sc daily    Thank you  Jazz Huertas, PharmD  [phone]

## 2023-02-12 ENCOUNTER — APPOINTMENT (OUTPATIENT)
Dept: GENERAL RADIOLOGY | Age: 48
DRG: 240 | End: 2023-02-12
Attending: HOSPITALIST
Payer: MEDICAID

## 2023-02-12 LAB
ANION GAP SERPL CALC-SCNC: 5 MMOL/L (ref 5–15)
BUN SERPL-MCNC: 24 MG/DL (ref 6–20)
BUN/CREAT SERPL: 33 (ref 12–20)
CALCIUM SERPL-MCNC: 8.3 MG/DL (ref 8.5–10.1)
CHLORIDE SERPL-SCNC: 97 MMOL/L (ref 97–108)
CO2 SERPL-SCNC: 31 MMOL/L (ref 21–32)
CREAT SERPL-MCNC: 0.73 MG/DL (ref 0.7–1.3)
GLUCOSE SERPL-MCNC: 99 MG/DL (ref 65–100)
POTASSIUM SERPL-SCNC: 3.3 MMOL/L (ref 3.5–5.1)
SODIUM SERPL-SCNC: 133 MMOL/L (ref 136–145)

## 2023-02-12 PROCEDURE — 74011000250 HC RX REV CODE- 250: Performed by: INTERNAL MEDICINE

## 2023-02-12 PROCEDURE — 74011250636 HC RX REV CODE- 250/636: Performed by: NURSE PRACTITIONER

## 2023-02-12 PROCEDURE — 74011250636 HC RX REV CODE- 250/636: Performed by: STUDENT IN AN ORGANIZED HEALTH CARE EDUCATION/TRAINING PROGRAM

## 2023-02-12 PROCEDURE — 36415 COLL VENOUS BLD VENIPUNCTURE: CPT

## 2023-02-12 PROCEDURE — 74018 RADEX ABDOMEN 1 VIEW: CPT

## 2023-02-12 PROCEDURE — 65270000029 HC RM PRIVATE

## 2023-02-12 PROCEDURE — 80048 BASIC METABOLIC PNL TOTAL CA: CPT

## 2023-02-12 PROCEDURE — 74011250636 HC RX REV CODE- 250/636: Performed by: HOSPITALIST

## 2023-02-12 RX ORDER — POTASSIUM CHLORIDE 7.45 MG/ML
10 INJECTION INTRAVENOUS
Status: COMPLETED | OUTPATIENT
Start: 2023-02-12 | End: 2023-02-12

## 2023-02-12 RX ADMIN — HYDROMORPHONE HYDROCHLORIDE 1 MG: 1 INJECTION, SOLUTION INTRAMUSCULAR; INTRAVENOUS; SUBCUTANEOUS at 23:20

## 2023-02-12 RX ADMIN — SODIUM CHLORIDE, PRESERVATIVE FREE 10 ML: 5 INJECTION INTRAVENOUS at 05:36

## 2023-02-12 RX ADMIN — HYDROMORPHONE HYDROCHLORIDE 1 MG: 1 INJECTION, SOLUTION INTRAMUSCULAR; INTRAVENOUS; SUBCUTANEOUS at 00:24

## 2023-02-12 RX ADMIN — POTASSIUM CHLORIDE 10 MEQ: 7.45 INJECTION INTRAVENOUS at 08:06

## 2023-02-12 RX ADMIN — HYDROMORPHONE HYDROCHLORIDE 1 MG: 1 INJECTION, SOLUTION INTRAMUSCULAR; INTRAVENOUS; SUBCUTANEOUS at 05:36

## 2023-02-12 RX ADMIN — HYDROMORPHONE HYDROCHLORIDE 1 MG: 1 INJECTION, SOLUTION INTRAMUSCULAR; INTRAVENOUS; SUBCUTANEOUS at 20:46

## 2023-02-12 RX ADMIN — SODIUM CHLORIDE, PRESERVATIVE FREE 10 ML: 5 INJECTION INTRAVENOUS at 00:25

## 2023-02-12 RX ADMIN — HYDROMORPHONE HYDROCHLORIDE 1 MG: 1 INJECTION, SOLUTION INTRAMUSCULAR; INTRAVENOUS; SUBCUTANEOUS at 08:06

## 2023-02-12 RX ADMIN — POTASSIUM CHLORIDE 10 MEQ: 7.45 INJECTION INTRAVENOUS at 13:57

## 2023-02-12 RX ADMIN — POTASSIUM CHLORIDE 10 MEQ: 7.45 INJECTION INTRAVENOUS at 11:49

## 2023-02-12 RX ADMIN — HYDROMORPHONE HYDROCHLORIDE 1 MG: 1 INJECTION, SOLUTION INTRAMUSCULAR; INTRAVENOUS; SUBCUTANEOUS at 16:56

## 2023-02-12 RX ADMIN — POTASSIUM CHLORIDE 10 MEQ: 7.45 INJECTION INTRAVENOUS at 09:26

## 2023-02-12 RX ADMIN — SODIUM CHLORIDE, PRESERVATIVE FREE 10 ML: 5 INJECTION INTRAVENOUS at 21:10

## 2023-02-12 RX ADMIN — ENOXAPARIN SODIUM 40 MG: 100 INJECTION SUBCUTANEOUS at 08:06

## 2023-02-12 RX ADMIN — HYDROMORPHONE HYDROCHLORIDE 1 MG: 1 INJECTION, SOLUTION INTRAMUSCULAR; INTRAVENOUS; SUBCUTANEOUS at 11:49

## 2023-02-12 RX ADMIN — HYDROMORPHONE HYDROCHLORIDE 1 MG: 1 INJECTION, SOLUTION INTRAMUSCULAR; INTRAVENOUS; SUBCUTANEOUS at 14:00

## 2023-02-12 RX ADMIN — ONDANSETRON 4 MG: 2 INJECTION INTRAMUSCULAR; INTRAVENOUS at 21:10

## 2023-02-12 NOTE — PROGRESS NOTES
Doing well. Baseline pain, stool output via stoma. Denies n/v. Tolerating clears.   AFVS  Abd firm, nondistended, nontender    Resolving SBO  Advance to full liquids

## 2023-02-12 NOTE — PROGRESS NOTES
Problem: Falls - Risk of  Goal: *Absence of Falls  Description: Document Joe Ybarra Fall Risk and appropriate interventions in the flowsheet.   Outcome: Progressing Towards Goal  Note: Fall Risk Interventions:                                Problem: Small Bowel Obstruction: Day 3  Goal: Activity/Safety  Outcome: Progressing Towards Goal     Problem: Small Bowel Obstruction: Day 3  Goal: Nutrition/Diet  Outcome: Progressing Towards Goal Mauc Instructions: By selecting yes to the question below the MAUC number will be added into the note.  This will be calculated automatically based on the diagnosis chosen, the size entered, the body zone selected (H,M,L) and the specific indications you chose. You will also have the option to override the Mohs AUC if you disagree with the automatically calculated number and this option is found in the Case Summary tab.

## 2023-02-12 NOTE — PROGRESS NOTES
Physician Progress Note      PATIENTDalbessie OLIVER #:                  [id number]  :                       1975  ADMIT DATE:       2/10/2023 2:04 AM  DISCH DATE:  RESPONDING  PROVIDER #:        Gallo Bennett MD          QUERY TEXT:    Pt admitted with SBO and ALMA, noted to have low serum sodium on admission. If possible, please document in the progress notes and discharge summary if you are evaluating and / or treating any of the following: The medical record reflects the following:  Risk Factors: SBO, colon ca, ALMA  Clinical Indicators: pt admitted with SBO with N/V  - Sodium 127 on admission  - ED provider notes w/u revealing hyponatremia  Treatment: LR bolus (1L), IVFs  ml/hr, monitoring    Thank you,    Mary Alice Zendejas RN  CDI  Options provided:  -- Hyponatremia  -- Clinically insignificant low serum sodium  -- Other - I will add my own diagnosis  -- Disagree - Not applicable / Not valid  -- Disagree - Clinically unable to determine / Unknown  -- Refer to Clinical Documentation Reviewer    PROVIDER RESPONSE TEXT:    This patient has hyponatremia.     Query created by: Teo Naylor on 2/10/2023 1:09 PM      Electronically signed by:  Gallo Bennett MD 2023 8:32 AM

## 2023-02-12 NOTE — PROGRESS NOTES
Hospitalist Progress Note              Guru Chavis MD.                                                             Cell: [phone]                               NAME:  Anson Conteh Sr.  :  1975  MRN:  [id number]  Date of Service:  2023    Summary: 41 y.o. male   The patient is a 42 y/o  male with PMH follicular lymphoma s/p chemotherapy currently under active surveillance and colon cancer w/ poorly differentiated adenocarcinoma s/p loop ileostomy (22) and undergoing tx w/ FOLFIRI who comes in w/ symptoms of worsening nausea and vomiting that has been on-going since Tuesday. He notes at least 5 bilious episodes per day. He has chronic abdominal pain due to his cancer history but denies any new or worsening symptoms. He has not had any recent significant weight loss or decreased stool output. He does report of small liquid bowel movement earlier on in the day. He has no fever, chills or night sweats. He has no chest pain, palpitations, coughing, wheezing or dyspnea. Assessment/Plan:  Small bowel obstruction: this is due to stage 4 colon cancer with peritoneal carcinomatosis  NG tube removed. No vomiting or nausea. No abdominal distension  Tolerated clears and has been started on full liquid diet by surgery  KUB performed today showed unchanged small bowel obstruction. We will Continue conservative management with IVF, antiemetics  The rest of management as per surgery    Stage 4 colon cancer, follicular lymphoma  S/p chemotherapy and immune therapy  Oncology is following    ALMA: Secondary to N/V and IVVD  Scr 2.56 from 1.08  This is resolved  Continue gentle IVF    Hyponatremia: due to hypovolemia. This is improving   Continue gentle IVF    Hypokalemia:  We will replete with kcl     Code status:full  DVT prophylaxsis:SCD  Dispo: pending         Interval History/Subjective:  F/up for small bowel obstruction  Tolerated clear liquid diet  Denies nausea or vomiting. Mild abdominal pain. There is stool in ileostomy bag    Review of Systems:  A comprehensive review of systems was negative except for that written in the HPI. Objective:     VITALS:   Last 24hrs VS reviewed since prior progress note. Most recent are:  Visit Vitals  BP (!) 148/90   Pulse 89   Temp 97.5 °F (36.4 °C)   Resp 16   SpO2 97%       Intake/Output Summary (Last 24 hours) at 2/12/2023 1343  Last data filed at 2/12/2023 0854  Gross per 24 hour   Intake 2153.33 ml   Output 150 ml   Net 2003.33 ml          PHYSICAL EXAM:  General:chronically ill looking  EENT: NG tube with billious fluid  Resp: CTA bilaterally. No wheezing/rhonchi/rales. No accessory muscle use  CV: Regular rhythm, normal rate, no murmurs, gallops, rubs  GI: Soft, non distended, mild gen tenderness. +ve bowel sound heard  no hepatosplenomegaly. Ileostomy with fecal matter  Extremities: No edema, warm, 2+ pulses throughout  Neurologic: Moves all extremities. AAOx3, CN II-XII grossly intact  Psych: Good insight. Not anxious nor agitated. Skin: Good Turgor, no rashes or ulcers    Lab Data Personally Reviewed: (see below)     Medications list Personally Reviewed:  x YES  NO     _______________________________________________________________________  Care Plan discussed with:  Patient/Family, Nurse, and     Total NON critical care TIME:  30 minutes    Eric Chowdary MD     Procedures: see electronic medical records for all procedures/Xrays and details which were not copied into this note but were reviewed prior to creation of Plan.       LABS:  Recent Labs     02/09/23  1816   WBC 7.1   HGB 12.2   HCT 36.1*          Recent Labs     02/12/23  0023 02/11/23  0236 02/09/23  1529   * 131* 127*   K 3.3* 3.6 3.4*   CL 97 97 87*   CO2 31 27 29   BUN 24* 41* 37*   CREA 0.73 1.11 2.56*   GLU 99 102* 153*   CA 8.3* 8.8 10.5*       Recent Labs     02/09/23  1816   LPSE 49*       No results for input(s): INR, PTP, APTT, INREXT, INREXT in the last 72 hours. No results for input(s): FE, TIBC, PSAT, FERR in the last 72 hours. No results found for: FOL, RBCF   No results for input(s): PH, PCO2, PO2 in the last 72 hours. No results for input(s): CPK, CKNDX, TROIQ in the last 72 hours.     No lab exists for component: CPKMB  Lab Results   Component Value Date/Time    Cholesterol, total 170 09/28/2016 04:40 AM    HDL Cholesterol 23 09/28/2016 04:40 AM    LDL, calculated 96.4 09/28/2016 04:40 AM    Triglyceride 253 (H) 09/28/2016 04:40 AM    CHOL/HDL Ratio 7.4 (H) 09/28/2016 04:40 AM     Lab Results   Component Value Date/Time    Glucose (POC) 249 (H) 02/15/2019 10:21 PM    Glucose (POC) 121 (H) 02/14/2019 01:24 PM    Glucose (POC) 98 06/05/2009 02:13 PM     Lab Results   Component Value Date/Time    Color YELLOW/STRAW 02/13/2022 10:52 PM    Appearance CLEAR 02/13/2022 10:52 PM    Specific gravity 1.014 02/13/2022 10:52 PM    Specific gravity 1.010 02/15/2019 02:15 AM    pH (UA) 6.0 02/13/2022 10:52 PM    Protein Negative 02/13/2022 10:52 PM    Glucose Negative 02/13/2022 10:52 PM    Ketone Negative 02/13/2022 10:52 PM    Bilirubin Negative 02/13/2022 10:52 PM    Urobilinogen 0.2 02/13/2022 10:52 PM    Nitrites Negative 02/13/2022 10:52 PM    Leukocyte Esterase Negative 02/13/2022 10:52 PM    Epithelial cells FEW 02/13/2022 10:52 PM    Bacteria Negative 02/13/2022 10:52 PM    WBC 0-4 02/13/2022 10:52 PM    RBC 0-5 02/13/2022 10:52 PM

## 2023-02-12 NOTE — PROGRESS NOTES
Bedside shift change report given to Josue Mcgowan (oncoming nurse) by Yue Wilson (offgoing nurse).  Report included the following information SBAR, Kardex, Intake/Output, MAR, Recent Results, Med Rec Status, and Cardiac Rhythm SR .

## 2023-02-13 ENCOUNTER — HOSPICE ADMISSION (OUTPATIENT)
Dept: HOSPICE | Facility: HOSPICE | Age: 48
End: 2023-02-13

## 2023-02-13 ENCOUNTER — TELEPHONE (OUTPATIENT)
Dept: ONCOLOGY | Age: 48
End: 2023-02-13

## 2023-02-13 ENCOUNTER — HOSPITAL ENCOUNTER (OUTPATIENT)
Dept: INFUSION THERAPY | Age: 48
End: 2023-02-13

## 2023-02-13 LAB
ANION GAP SERPL CALC-SCNC: 4 MMOL/L (ref 5–15)
BUN SERPL-MCNC: 12 MG/DL (ref 6–20)
BUN/CREAT SERPL: 21 (ref 12–20)
CALCIUM SERPL-MCNC: 8.5 MG/DL (ref 8.5–10.1)
CHLORIDE SERPL-SCNC: 99 MMOL/L (ref 97–108)
CO2 SERPL-SCNC: 30 MMOL/L (ref 21–32)
CREAT SERPL-MCNC: 0.57 MG/DL (ref 0.7–1.3)
GLUCOSE SERPL-MCNC: 101 MG/DL (ref 65–100)
MAGNESIUM SERPL-MCNC: 1.6 MG/DL (ref 1.6–2.4)
POTASSIUM SERPL-SCNC: 4.3 MMOL/L (ref 3.5–5.1)
SODIUM SERPL-SCNC: 133 MMOL/L (ref 136–145)

## 2023-02-13 PROCEDURE — 80048 BASIC METABOLIC PNL TOTAL CA: CPT

## 2023-02-13 PROCEDURE — 74011250636 HC RX REV CODE- 250/636: Performed by: HOSPITALIST

## 2023-02-13 PROCEDURE — 74011250637 HC RX REV CODE- 250/637: Performed by: NURSE PRACTITIONER

## 2023-02-13 PROCEDURE — 74011000250 HC RX REV CODE- 250: Performed by: INTERNAL MEDICINE

## 2023-02-13 PROCEDURE — 99233 SBSQ HOSP IP/OBS HIGH 50: CPT | Performed by: NURSE PRACTITIONER

## 2023-02-13 PROCEDURE — 74011250637 HC RX REV CODE- 250/637: Performed by: INTERNAL MEDICINE

## 2023-02-13 PROCEDURE — 83735 ASSAY OF MAGNESIUM: CPT

## 2023-02-13 PROCEDURE — 74011250636 HC RX REV CODE- 250/636: Performed by: STUDENT IN AN ORGANIZED HEALTH CARE EDUCATION/TRAINING PROGRAM

## 2023-02-13 PROCEDURE — 36415 COLL VENOUS BLD VENIPUNCTURE: CPT

## 2023-02-13 PROCEDURE — 74011250636 HC RX REV CODE- 250/636: Performed by: NURSE PRACTITIONER

## 2023-02-13 PROCEDURE — 99233 SBSQ HOSP IP/OBS HIGH 50: CPT | Performed by: INTERNAL MEDICINE

## 2023-02-13 PROCEDURE — 65270000029 HC RM PRIVATE

## 2023-02-13 RX ORDER — HYDRALAZINE HYDROCHLORIDE 20 MG/ML
10 INJECTION INTRAMUSCULAR; INTRAVENOUS
Status: DISCONTINUED | OUTPATIENT
Start: 2023-02-13 | End: 2023-02-14 | Stop reason: HOSPADM

## 2023-02-13 RX ORDER — HYDRALAZINE HYDROCHLORIDE 20 MG/ML
10 INJECTION INTRAMUSCULAR; INTRAVENOUS ONCE
Status: COMPLETED | OUTPATIENT
Start: 2023-02-13 | End: 2023-02-13

## 2023-02-13 RX ORDER — GABAPENTIN 300 MG/1
300 CAPSULE ORAL
Status: DISCONTINUED | OUTPATIENT
Start: 2023-02-13 | End: 2023-02-14 | Stop reason: HOSPADM

## 2023-02-13 RX ORDER — OXYCODONE HCL 20 MG/1
20 TABLET, FILM COATED, EXTENDED RELEASE ORAL EVERY 8 HOURS
Status: DISCONTINUED | OUTPATIENT
Start: 2023-02-13 | End: 2023-02-14 | Stop reason: HOSPADM

## 2023-02-13 RX ORDER — HYDROMORPHONE HYDROCHLORIDE 1 MG/ML
1 INJECTION, SOLUTION INTRAMUSCULAR; INTRAVENOUS; SUBCUTANEOUS
Status: DISCONTINUED | OUTPATIENT
Start: 2023-02-13 | End: 2023-02-14 | Stop reason: HOSPADM

## 2023-02-13 RX ORDER — METOPROLOL TARTRATE 5 MG/5ML
1.25 INJECTION INTRAVENOUS EVERY 6 HOURS
Status: DISCONTINUED | OUTPATIENT
Start: 2023-02-13 | End: 2023-02-13

## 2023-02-13 RX ORDER — OXYCODONE HYDROCHLORIDE 5 MG/1
20 TABLET ORAL
Status: DISCONTINUED | OUTPATIENT
Start: 2023-02-13 | End: 2023-02-14 | Stop reason: HOSPADM

## 2023-02-13 RX ORDER — METOPROLOL TARTRATE 25 MG/1
25 TABLET, FILM COATED ORAL EVERY 12 HOURS
Status: DISCONTINUED | OUTPATIENT
Start: 2023-02-13 | End: 2023-02-14 | Stop reason: HOSPADM

## 2023-02-13 RX ADMIN — HYDRALAZINE HYDROCHLORIDE 10 MG: 20 INJECTION INTRAMUSCULAR; INTRAVENOUS at 04:19

## 2023-02-13 RX ADMIN — HYDROMORPHONE HYDROCHLORIDE 1 MG: 1 INJECTION, SOLUTION INTRAMUSCULAR; INTRAVENOUS; SUBCUTANEOUS at 14:50

## 2023-02-13 RX ADMIN — HYDROMORPHONE HYDROCHLORIDE 1 MG: 1 INJECTION, SOLUTION INTRAMUSCULAR; INTRAVENOUS; SUBCUTANEOUS at 01:42

## 2023-02-13 RX ADMIN — SODIUM CHLORIDE, PRESERVATIVE FREE 10 ML: 5 INJECTION INTRAVENOUS at 08:49

## 2023-02-13 RX ADMIN — HYDROMORPHONE HYDROCHLORIDE 1 MG: 1 INJECTION, SOLUTION INTRAMUSCULAR; INTRAVENOUS; SUBCUTANEOUS at 11:43

## 2023-02-13 RX ADMIN — SODIUM CHLORIDE, PRESERVATIVE FREE 10 ML: 5 INJECTION INTRAVENOUS at 17:28

## 2023-02-13 RX ADMIN — OXYCODONE HYDROCHLORIDE 20 MG: 20 TABLET, FILM COATED, EXTENDED RELEASE ORAL at 21:54

## 2023-02-13 RX ADMIN — METOPROLOL TARTRATE 25 MG: 25 TABLET, FILM COATED ORAL at 21:54

## 2023-02-13 RX ADMIN — SODIUM CHLORIDE, PRESERVATIVE FREE 10 ML: 5 INJECTION INTRAVENOUS at 21:56

## 2023-02-13 RX ADMIN — METOPROLOL TARTRATE 1.25 MG: 5 INJECTION, SOLUTION INTRAVENOUS at 09:19

## 2023-02-13 RX ADMIN — METOPROLOL TARTRATE 25 MG: 25 TABLET, FILM COATED ORAL at 12:14

## 2023-02-13 RX ADMIN — SODIUM CHLORIDE, POTASSIUM CHLORIDE, SODIUM LACTATE AND CALCIUM CHLORIDE 75 ML/HR: 600; 310; 30; 20 INJECTION, SOLUTION INTRAVENOUS at 04:29

## 2023-02-13 RX ADMIN — GABAPENTIN 300 MG: 300 CAPSULE ORAL at 21:54

## 2023-02-13 RX ADMIN — OXYCODONE HYDROCHLORIDE 20 MG: 20 TABLET, FILM COATED, EXTENDED RELEASE ORAL at 17:28

## 2023-02-13 RX ADMIN — HYDROMORPHONE HYDROCHLORIDE 1 MG: 1 INJECTION, SOLUTION INTRAMUSCULAR; INTRAVENOUS; SUBCUTANEOUS at 19:23

## 2023-02-13 RX ADMIN — ENOXAPARIN SODIUM 40 MG: 100 INJECTION SUBCUTANEOUS at 08:48

## 2023-02-13 RX ADMIN — HYDROMORPHONE HYDROCHLORIDE 1 MG: 1 INJECTION, SOLUTION INTRAMUSCULAR; INTRAVENOUS; SUBCUTANEOUS at 04:29

## 2023-02-13 RX ADMIN — ONDANSETRON 4 MG: 2 INJECTION INTRAMUSCULAR; INTRAVENOUS at 14:50

## 2023-02-13 RX ADMIN — ONDANSETRON 4 MG: 2 INJECTION INTRAMUSCULAR; INTRAVENOUS at 04:36

## 2023-02-13 RX ADMIN — HYDROMORPHONE HYDROCHLORIDE 1 MG: 1 INJECTION, SOLUTION INTRAMUSCULAR; INTRAVENOUS; SUBCUTANEOUS at 08:48

## 2023-02-13 NOTE — PROGRESS NOTES
[facility] at [address], [address]  W: [phone]  F: [phone]      Reason for Visit:   Dayan Adames is a 41 y.o. male who is seen for evaluation of Stage IV colon cancer with carcinomatosis. Hematology/Oncology Treatment History:     Diagnosis #1: Follicular lymphoma  Stage: IV  Pathology:   11/13/18 right inguinal LN excision: Follicular lymphoma, high-grade (grade 3a of 3). Prior Treatment:   1. Obinutuzumab-CHOP. Obinutuzumab: 1000 mg weekly on days 1, 8, 15 for cycle 1, then 1000 mg on day 1 q21 days for cycles 2-6, then monotherapy 1000 mg every 21 days for cycle 7, 8 with Cyclophosphamide 750mg/m2, Doxorubicin 50mg/m2, Vincristine 1.4mg/m2 on day 1 and Prednisone 100mg on Days 1-5, every 21 days for a total of 2 cycles completed late 1/2019. Regimen discontinued due to STEMI. 2. Obinutuzumab + Bendamustine: 1000 mg Obinutuzumab on day 1 + Bendamustine 90mg/m2 on days 1-2 on a 28-day cycle x 4 cycles, completed 5/2019  Current Treatment: Surveillance           Diagnosis #2: Colon cancer:  Stage: IV  Pathology:    2/19/22 Ascending colon; biopsy: poorly differentiated carcinoma  Comment: No dysplasia is identified. Immunohistochemical stains performed on block 1A, show the tumor positive for Cam5.2 and shows patchy positivity for CDX2 with a Ki-67 index of -90%; the tumor is focally positive for CK5/6 and GATA3; negative for p63, Pax8, CK7, CK20, panmelanoma, synaptophysin and chromogranin. The immunophenotypic features are nonspecific but argues somewhat against the following carcinoma: urothelial, neuroendocrine and breast. The immunophenotypic features also argues against melanoma and somewhat against classic colorectal carcinoma.   Additional immunohistochemical stains to exclude the possibility of prostate and hepatocellular carcinoma have been   ordered; the results will be reported as an addendum. -MLH1/MSH2/MSH6/PMS2 all intact with no loss of nuclear expression of MMR proteins - no MSI   Addendum: The addendum is issued to report the results of additional immunohistochemical stains in an attempt to ascertain the primary site of the carcinoma. The diagnosis is unchanged. Hepar and glypican 3 immunohistochemical stains are neg, arguing against hepatocellular carcinoma. PSA stain is negative, arguing against prostatic primary. Imaging studies to eval for poss primary sites maybe useful. In the absence of carcinoma/tumor at any other sites, this may represent an undifferentiated carcinoma of the colon.  -Oncogenomics (molecular) studies: POLE< ARID1A, YVONNE, ATR, BRCA2, CHECK1, FANCI, NF1, PIK3CA, PTCH1, PTEN, RAD50, RAD51, RB1, SMARCA4, STK11  -Neogenomics: KRAS exon 2 and exon 3 not detected, a VUS p. E98K is detected in Exon 4 of KRAS, PD-L1 28-8 (Opdivo) for gastric/GEJ/EAC no expression, PD-L1 (22C3 pharmDx) TPS 1% (pembrolizumab), BRAF mutation not detected, NRAS exon 2 not detected, NRAS 3 not detected, NRAS 4 not detected. Prior Treatment:   1. Loop ileostomy 2/19/22  2. Diagnostic laparoscopy with peritoneal biopsy, 4/27/22  3. FOLFOXIRI every 2 weeks until disease progression or toxicity, 5/16/22 - 12/2022. Current Treatment: FOLFIRI + Panitumumab (6 mg/kg) every 14 days, to start 1/9/23     Oncologic History:  Was in his usual state of health in early November 2018 when he developed low back pain and presented to the ER. Work-up at outside hospital revealed a retroperitoneal mass seen on CT imaging, and he was transferred to [facility] for further work-up. CT there showed a large retroperitoneal mass encircling the aorta with invasion of the left renal hilum and left adrenal gland. There were bilateral inguinal lymph nodes and moderate left hydronephrosis. He was evaluated while at [facility] and was noted to have palpable nodes in his groin for approximately the past 1 month. He underwent excisional LN biopsy of right inguinal LN, which revealed follicular lymphoma. At time of diagnosis, he had no cardiac disease aside from HTN, hyperlipidemia. However, he did have an NSTEMI after cycle 2 of O-CHOP. Was likely unrelated to chemotherapy, but opted to switch treatment to Obinutuzumab-Bendamustine. He completed treatment in 5/2019 and had a CR based on PET. History of Present Illness:   Christiano Cast Sr. is a pleasant 41 y.o. male with metastatic colon cancer who was admitted on 2/10/23 for N/V, abdominal pain. Pt was evaluated in our outpatient infusion center [facility]) yesterday for N/V despite not having chemotherapy for 2.5 weeks. Given concern for bowel obstruction, patient was directed to ED for further eval. His n/v started Tuesday and he noted decrease in ostomy output since Monday. ED labs notable for Na 127, Cr 2.56. ED CT A/P imaging reveals small bowel obstruction with worsening of carcinomatosis. Pt reports feels \"miserable\". Rates pain 5/10. Verbalizing little; eyes closed. Pt was recently switched from FOLFIRINOX chemotherapy to FOLFIRI + Panitumumab approx 5 weeks ago. Had NG tube placed and no current vomiting. No family at bedside. Interval History:     Pt lying in bed; rating pain 8/10. Continues to tolerate clear liquids. Undecided about whether he would like to try additional treatment. Waiting to discuss with wife and children but will likely do that once he is at home. No family at bedside.      Current Facility-Administered Medications   Medication Dose Route Frequency    hydrALAZINE (APRESOLINE) 20 mg/mL injection 10 mg  10 mg IntraVENous Q6H PRN    metoprolol tartrate (LOPRESSOR) tablet 25 mg  25 mg Oral Q12H    enoxaparin (LOVENOX) injection 40 mg  40 mg SubCUTAneous DAILY    ondansetron (ZOFRAN) injection 4 mg  4 mg IntraVENous Q6H PRN    sodium chloride (NS) flush 5-40 mL  5-40 mL IntraVENous Q8H    sodium chloride (NS) flush 5-40 mL  5-40 mL IntraVENous PRN    acetaminophen (TYLENOL) tablet 650 mg  650 mg Oral Q6H PRN    Or    acetaminophen (TYLENOL) suppository 650 mg  650 mg Rectal Q6H PRN    polyethylene glycol (MIRALAX) packet 17 g  17 g Oral DAILY PRN    lactated Ringers infusion  75 mL/hr IntraVENous CONTINUOUS    HYDROmorphone (DILAUDID) injection 1 mg  1 mg IntraVENous Q2H PRN       No Known Allergies       Review of Systems: A complete review of systems was obtained, reviewed. Pertinent findings reviewed above. Physical Exam:   Visit Vitals  BP (!) 154/90 (BP 1 Location: Right upper arm, BP Patient Position: Semi fowlers)   Pulse 94   Temp 97.5 °F (36.4 °C)   Resp 18   SpO2 96%     ECOG PS: 2-3  General: frail, cachectic;   Eyes:  anicteric sclera  Neck: supple  Respiratory normal respiratory effort  CV: port noted to upper chest area; no peripheral edema  GI: soft, nontender, nondistended. Ostomy noted with liquid brown drainage. MS: digits without clubbing or cyanosis. Skin: no rashes, no ecchymoses, no petechia. normal temperature, turgor, and texture. Psych: alert, oriented, appropriate affect, normal judgment/insight    Results:     Lab Results   Component Value Date/Time    WBC 7.1 02/09/2023 06:16 PM    HGB 12.2 02/09/2023 06:16 PM    HCT 36.1 (L) 02/09/2023 06:16 PM    PLATELET 893 03/87/8060 06:16 PM    MCV 92.8 02/09/2023 06:16 PM    ABS.  NEUTROPHILS 5.5 02/09/2023 06:16 PM    Hemoglobin (POC) 15.0 06/05/2009 02:13 PM    Hematocrit (POC) 39 02/14/2019 01:24 PM     Lab Results   Component Value Date/Time    Sodium 133 (L) 02/13/2023 11:38 AM    Potassium 4.3 02/13/2023 11:38 AM    Chloride 99 02/13/2023 11:38 AM    CO2 30 02/13/2023 11:38 AM    Glucose 101 (H) 02/13/2023 11:38 AM    BUN 12 02/13/2023 11:38 AM    Creatinine 0.57 (L) 02/13/2023 11:38 AM    GFR est AA >60 10/03/2022 07:50 AM    GFR est non-AA >60 10/03/2022 07:50 AM    Calcium 8.5 02/13/2023 11:38 AM    Sodium (POC) 136 02/14/2019 01:24 PM    Potassium (POC) 3.9 02/14/2019 01:24 PM    Chloride (POC) 102 02/14/2019 01:24 PM    Glucose (POC) 249 (H) 02/15/2019 10:21 PM    BUN (POC) 14 02/14/2019 01:24 PM    Creatinine (POC) 0.9 02/14/2019 01:24 PM    Calcium, ionized (POC) 1.24 02/14/2019 01:24 PM     Lab Results   Component Value Date/Time    Bilirubin, total 0.7 02/06/2023 07:52 AM    ALT (SGPT) 13 02/06/2023 07:52 AM    Alk. phosphatase 296 (H) 02/06/2023 07:52 AM    Protein, total 6.8 02/06/2023 07:52 AM    Albumin 2.8 (L) 02/06/2023 07:52 AM    Globulin 4.0 02/06/2023 07:52 AM     Lab Results   Component Value Date/Time    Iron % saturation 10 (L) 05/06/2022 11:13 AM    TIBC 173 (L) 05/06/2022 11:13 AM    Ferritin 426 (H) 05/06/2022 11:13 AM     02/15/2022 02:33 PM    Beta-2 Microglobulin, serum 2.5 (H) 12/28/2018 10:00 AM    TSH 1.53 09/28/2016 04:40 AM    Lipase 49 (L) 02/09/2023 06:16 PM    Hep C virus Ab Interp. NONREACTIVE 12/27/2018 04:53 PM    HIV 1/2 Interpretation NONREACTIVE 12/28/2018 10:00 AM       Lab Results   Component Value Date/Time    INR 1.1 02/22/2019 08:18 PM    aPTT 27.8 02/22/2019 08:18 PM      Lab Results   Component Value Date/Time    CEA 43.2 01/09/2023 08:09 AM     02/15/2022 02:33 PM    HCG, beta, QT <1 11/10/2018 03:41 AM    AFP, Serum, Tumor Marker 4.1 11/10/2018 03:41 AM      2/9/2023 CT A/P WO CONT  IMPRESSION  Small bowel obstruction with worsening of carcinomatosis. 2/10/23 XR ABD   IMPRESSION: Nasogastric tube appears to be in satisfactory position. 2/12/23 KUB: IMPRESSION   Unchanged small bowel obstruction. No visible enteric tube. Test results above have been reviewed. Assessment/Recommendations:       Undifferentiated carcinoma of transverse colon, metastatic:   SUZANNE, PDL1 1%, BRAF/NRAS negative, possibly KRAS wild-type  S/p diverting ileostomy due to large bowel obstruction. Colonoscopy with biopsy on 2/25/22.  No obvious metastatic disease on CT imaging, but did have obvious metastatic disease to peritoneum during laparoscopy with Dr. Hipolito Martinez. Initially treated with  FOLFOXIRI every 2 weeks. CT after C9 showed good response with decrease in size of colon mass, but increased peritoneal implants. Current treatment with second line therapy  FOLFIRI + Panitumumab every 2 weeks. 2/6/23  C3 FOLFIRI + Panitumumab delayed due to neutropenia; rescheduled for 2/13/22 . Unfortunately now admitted with small bowel obstruction and  worsening carcinomatosis. Have reviewed with patient that SBO in the setting metastatic cancer, especially carcinomatosis, is quite difficult to treat. It is likely to be recurrent and is unlikely to respond to any treatments. Pt is improving symptom wise but not radio graphically; pt is  tolerating clear liquids and colostomy having stool. Dr Anmol Alvarez reviewed additional tx options this am; oral regimen vs immunotherapy vs support of hospice. Oral tx not ideal in setting of SBO and takes time to work; immunotherapy also slow to work. Pt would like to discuss with family but shares it will more than likely not be until he is home. -- Discussed that further therapy is unlikely to improve his disease, but that switching to immunotherapy is an option if he wants to keep trying treatment. He will think about this. -- Outpatient HemOnc visit 2/17/23; reviewed with pt and placed in discharge summary. 2. Small bowel obstruction / ALMA:  General surgery following - not a great candidate for surgery. No discrete transition point suspected. ALMA 2/2 decreased PO intake, n/v.  -- Supportive care per surg team; NGT out; tolerating clear liquids; ostomy producing liquid stool. 3. Chronic lumbar back pain / anxiety / RLQ / acute right upper quadrant and umbilical pain:   Left lower back pain is chronic/stable. RLQ is likely related to neoplasm/colostomy/parastomal hernia. Evaluated by Dr. Hipolito Martinez who believes umbilical burning pain is due to tumor implants.    -- Follows  with Dr. Vladimir Dover outpatient   -- Following with Dr. Rupali Lewis and receiving 3-4 weeks of daily palliative radiation to tumor implants. 4. H/o Follicular lymphoma:   Completed treatment 5/2019. End of treatment PET 6/3/19 showed CR. No extra surveillance needed at this time given metastatic colon cancer with frequent imaging. Current imaging shows slight increase in soft tissue density in deep pelvis on 10/2022. Will continue to monitor for colon cancer follow up imaging. 5. CAD / HTN:   h/o STEMI 2/14/19 with TOM placed to proximal LAD. Follows with Dr. Jamaal Whipple. On ASA, Lipitor. 6. Goals of care:   Disease is incurable and will be difficult to treat if pt has recurrent SBO. Pt waiting to discuss with family members tx options. --Palliative team following. I personally saw and evaluated the patient and performed the key components of medical decision making. The history, physical exam, and documentation were performed by Angela Currie NP. I reviewed and verified the above documentation and modified it as needed. Patient's SBO clinically seems to be improving/resolving as his colostomy is putting out stool and no further n/v with clear liquid diet. Discussed that unfortunately, he is at risk for recurrent SBO given the nature of his disease and the unlikely therapeutic benefit of continuing on chemotherapy or switching to immunotherapy. Asked pt to discuss his wishes and goals with his family. Immunotherapy is a possible option that could be started outpatient later this week if he wants to try a treatment with lower risk of side effects, but also lower likelihood of therapeutic effect. He wants time to consider his options.      Signed By: Stephenie Mak MD

## 2023-02-13 NOTE — PROGRESS NOTES
Patient's blood pressure on the high side, sent a message to On call NP through [employer]. At 0500 patient refused bloodworks now, wants it later. Does not want to be disturbed. At 0730 Rounds done, patient still sleeping. Handed over accordingly to day shift, still for bloodworks. Bedside shift change report given to Leo Barnes (oncoming nurse) by Mary Jane (offgoing nurse). Report included the following information SBAR, Kardex, Intake/Output, MAR, and Recent Results.

## 2023-02-13 NOTE — PROGRESS NOTES
Problem: Falls - Risk of  Goal: *Absence of Falls  Description: Document Leda Serna Fall Risk and appropriate interventions in the flowsheet. Outcome: Progressing Towards Goal  Note: Fall Risk Interventions:                                Problem: Pressure Injury - Risk of  Goal: *Prevention of pressure injury  Description: Document Asim Scale and appropriate interventions in the flowsheet. Outcome: Progressing Towards Goal  Note: Pressure Injury Interventions:             Activity Interventions: Increase time out of bed         Nutrition Interventions: Document food/fluid/supplement intake                     Problem: Small Bowel Obstruction: Day 4 to Discharge  Goal: Activity/Safety  Outcome: Progressing Towards Goal     Problem: Small Bowel Obstruction: Day 4 to Discharge  Goal: Medications  Outcome: Progressing Towards Goal

## 2023-02-13 NOTE — PROGRESS NOTES
Palliative Medicine Consult  Paramjit: [phone])    Patient Name: Lamont Rose. YOB: 1975    Date of Initial Consult: February 10, 2023  Reason for Consult: Care Decisions  Requesting Provider: Dr. Sarah Rayo   Primary Care Physician: Johnny Reyes MD     SUMMARY:     Lamont Ruiz is a 41y.o. year old with high-grade follicular lymphoma, who was referred to Palliative Medicine by Dr. Sunny Mcbride for symptom management and supportive care. He was diagnosed in 11/2018 after presenting with back pain, treated with systemic chemotherapy with complete response. He suffered cardiac arrest during cycle #3 due to previously undiagnosed severe stenosis of the LAD s/p PTCA and stent. He was diagnosed with poorly differentiated carcinoma of the colon (no evidence of metastatic disease) in 2/14/22 and underwent loop ileostomy at [address] [place] [address] on 2/19/22. He underwent exploratory laparoscopy in 4/2022 which revealed a large tumor at the hepatic flexure peritoneal carcinomatosis. Scans 12/2022 revealed disease progression. He is currently being treated with systemic chemotherapy under the care of Dr. Shaniqua Boyikn with the goal of palliation of symptoms and prolongation of life. He was admitted 2/10/23 from home with a diagnosis of SBO, ARF, nausea, abdominal and back pain. General surgery consulted. Seen in our clinic by Dr. Jax Whitney on 2/6/23. The patients social history includes: he is single. He lives in [place] with his girlfriend, Star Gonzalez, and their young old son. He has 3 teenage children who live with their mother and with whom he has close contact. 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 Palliative Medicine is addressing the following current patient/family concerns: abdominal pain related to malignancy; anxiety, depression; left mid- and low back pain, poor appetite, fatigue, advanced care planning.      PALLIATIVE DIAGNOSES: Palliative Care Encounter  Cancer Related Pain  Acute abdominal Pain  SBO  Nausea and vomiting     PLAN:   Cancer Pain: pt is prescribed Gabapentin 300mg hs,oxycodone ER 20mg every 8 and oxycodone 20mg every 4 hours as needed outpatient. He is now cleared for a full liquid diet so I ordered his outpatient pain regimen. Will limit availability of the iv dilaudid to every 6 as needed if he fails to respond to oral meds    SBO: NGT discontinued, zofran 4mg every 6 as needed  Code Status discussion held and pt agreed to DNR. DDNR completed    Discussion: pt is  and wife is surrogate. He declined offer to complete AMD but we discussed his goals with wife present. Wife and pt understand that obstruction cannot be fixed and symptoms leading to this admission will occur again. Wife understands that oncology has nothing much to offer. The pt stated he didn't think he was ready for hospice yet. Further discussion revealed misconceptions about hospice that we were able to address.  He is willing to meet with hospice for additional information  Consult placed for informational session with hospice  They are planning a family meeting with his older children to discuss his illness  Will follow as helpful    Initial consult note routed to primary continuity provider and/or primary health care team members  Communicated plan of care with: Palliative Di[facility] JAMIL [facility] Team     GOALS OF CARE / TREATMENT PREFERENCES:     GOALS OF CARE:  Patient/Health Care Proxy Stated Goals: Prolong life    TREATMENT PREFERENCES:   Code Status: DNR    Patient and family's personal goals include: prolong life     Advance Care Planning:  [] The [facility] [place] Interdisciplinary Team has updated the ACP Navigator with [address] and Patient Capacity    Primary Decision Maker: [facility] [phone] - [phone]      Advance Care Planning 2/10/2023   Patient's Healthcare Decision Maker is: Legal Next of [address] Advance Directive Yes, on file   Patient Would Like to Complete Advance Directive -   Does the patient have other document types -       Medical Interventions: Limited additional interventions       Other:    As far as possible, the palliative care team has discussed with patient / health care proxy about goals of care / treatment preferences for patient.      HISTORY:     History obtained from: chart, team,pt    CHIEF COMPLAINT: Pt admitted with aforementioned history and issues    HPI/SUBJECTIVE:    The patient is:   [x] Verbal and participatory  [] Non-participatory due to: medical condition   No complaints    Clinical Pain Assessment (nonverbal scale for severity on nonverbal patients):   Clinical Pain Assessment  Severity: 0  Location: abdomen  Character: uto  Duration: acute on chronic  Effect: decreased appetite  Factors: pain medications working well  Frequency: frequent due to sbo          Duration: for how long has pt been experiencing pain (e.g., 2 days, 1 month, years)  Frequency: how often pain is an issue (e.g., several times per day, once every few days, constant)     FUNCTIONAL ASSESSMENT:     Palliative Performance Scale (PPS):  PPS: 60       PSYCHOSOCIAL/SPIRITUAL SCREENING:     Palliative IDT has assessed this patient for cultural preferences / practices and a referral made as appropriate to needs (Cultural Services, Patient Advocacy, Ethics, etc.)    Any spiritual / Zoroastrian concerns:  [] Yes /  [x] No  [] Unable to obtain this information  If \"Yes\" to discuss with pastoral care during IDT     Does caregiver feel burdened by caring for their loved one:   [] Yes /  [] No /  [] No Caregiver Present/Available [x] No Caregiver [] Pt Lives at Facility  If \"Yes\" to discuss with social work during IDT    Anticipatory grief assessment:   [] Normal  / [] Maladaptive   [x] Unable to obtain this information  [] n/a  If \"Maladaptive\" to discuss with social work during IDT    ESAS Anxiety: Anxiety: 0    ESAS Depression:          REVIEW OF SYSTEMS:     Positive and pertinent negative findings in ROS are noted above in HPI. The following systems were [x] reviewed / [] unable to be reviewed as noted in HPI  Other findings are noted below. Systems: constitutional, ears/nose/mouth/throat, respiratory, gastrointestinal, genitourinary, musculoskeletal, integumentary, neurologic, psychiatric, endocrine. Positive findings noted below. Modified ESAS Completed by: provider   Fatigue: 2 Drowsiness: 0     Pain: 0   Anxiety: 0 Nausea: 0   Anorexia: 6 Dyspnea: 0           Stool Occurrence(s): 0        PHYSICAL EXAM:     From RN flowsheet:  Wt Readings from Last 3 Encounters:   02/09/23 119 lb 9.6 oz (54.3 kg)   02/09/23 119 lb (54 kg)   02/06/23 130 lb 8 oz (59.2 kg)     Blood pressure (!) 154/90, pulse 94, temperature 97.5 °F (36.4 °C), resp. rate 18, SpO2 96 %.     Pain Scale 1: Numeric (0 - 10)  Pain Intensity 1: 7  Pain Onset 1: constant  Pain Location 1: Abdomen  Pain Orientation 1: Right  Pain Description 1: Sharp  Pain Intervention(s) 1: Medication (see MAR)  Last bowel movement, if known:     Constitutional: ill appearing, frail, cachexia  Eyes: pupils equal, anicteric  ENMT: mmm  Cardiovascular:   Respiratory: breathing not labored, symmetric  Gastrointestinal: deferred  Musculoskeletal: no deformity  Skin: warm, dry  Neurologic: following commands, moving all extremities  Psychiatric: flat affect, calm  Other:       HISTORY:     Active Problems:    SBO (small bowel obstruction) (HCC) (2/10/2023)    Past Medical History:   Diagnosis Date    Anxiety and depression     Cancer ([facility] 02/2022    COLON    Chronic pain     lower back- lymphoma    Hyperlipidemia     Hypertension     NO MEDICATION FOR PAST 9 MONTHS    Lymphadenopathy 11/12/2018    Lymphoma, follicular ([facility] [address] 61/02/8329    chemo    Seizures ([facility] [address] CHILDHOOD    NEVER TOOK MEDICATION - \"GREW OUT OF THEM\"      Past Surgical History:   Procedure Laterality Date    HX ABDOMINAL LAPAROSCOPY  04/27/2022    Peritoneal biopsy Dr. Tejinder Khoury    HX COLONOSCOPY      HX HEART CATHETERIZATION  02/14/2019    LAD stent    HX ILEOSTOMY      HX OTHER SURGICAL Right 11/13/2018    inguinal lymph node excisional biopsy: high-grade follicular lymphoma    IR INJECTION PSEUDOANEURYSM  02/26/2019    CT UNLISTED PROCEDURE ABDOMEN PERITONEUM & OMENTUM  02/2022    COLON RESECTION WITH ILEOSTOMY    CT UNLISTED PROCEDURE CARDIAC SURGERY  2019    STENT X1      Family History   Problem Relation Age of Onset    Hypertension Father     Diabetes Father     Cancer Father         PROSTATE    Diabetes Mother     No Known Problems Brother     No Known Problems Brother     Anesth Problems Neg Hx       History reviewed, no pertinent family history.   Social History     Tobacco Use    Smoking status: Every Day     Packs/day: 0.50     Years: 20.00     Pack years: 10.00     Types: Cigarettes    Smokeless tobacco: Never    Tobacco comments:     \"STOP SMOKING\" PACKET GIVEN TO PATIENT   Substance Use Topics    Alcohol use: No     No Known Allergies   Current Facility-Administered Medications   Medication Dose Route Frequency    hydrALAZINE (APRESOLINE) 20 mg/mL injection 10 mg  10 mg IntraVENous Q6H PRN    metoprolol tartrate (LOPRESSOR) tablet 25 mg  25 mg Oral Q12H    enoxaparin (LOVENOX) injection 40 mg  40 mg SubCUTAneous DAILY    ondansetron (ZOFRAN) injection 4 mg  4 mg IntraVENous Q6H PRN    sodium chloride (NS) flush 5-40 mL  5-40 mL IntraVENous Q8H    sodium chloride (NS) flush 5-40 mL  5-40 mL IntraVENous PRN    acetaminophen (TYLENOL) tablet 650 mg  650 mg Oral Q6H PRN    Or    acetaminophen (TYLENOL) suppository 650 mg  650 mg Rectal Q6H PRN    polyethylene glycol (MIRALAX) packet 17 g  17 g Oral DAILY PRN    lactated Ringers infusion  75 mL/hr IntraVENous CONTINUOUS    HYDROmorphone (DILAUDID) injection 1 mg  1 mg IntraVENous Q2H PRN          LAB AND IMAGING FINDINGS:     Lab Results   Component Value Date/Time WBC 7.1 02/09/2023 06:16 PM    HGB 12.2 02/09/2023 06:16 PM    PLATELET 490 48/46/4414 06:16 PM     Lab Results   Component Value Date/Time    Sodium 133 (L) 02/13/2023 11:38 AM    Potassium 4.3 02/13/2023 11:38 AM    Chloride 99 02/13/2023 11:38 AM    CO2 30 02/13/2023 11:38 AM    BUN 12 02/13/2023 11:38 AM    Creatinine 0.57 (L) 02/13/2023 11:38 AM    Calcium 8.5 02/13/2023 11:38 AM    Magnesium 1.6 02/13/2023 11:38 AM    Phosphorus 2.0 (L) 01/12/2019 04:05 AM      Lab Results   Component Value Date/Time    Alk.  phosphatase 296 (H) 02/06/2023 07:52 AM    Protein, total 6.8 02/06/2023 07:52 AM    Albumin 2.8 (L) 02/06/2023 07:52 AM    Globulin 4.0 02/06/2023 07:52 AM     Lab Results   Component Value Date/Time    INR 1.1 02/22/2019 08:18 PM    Prothrombin time 10.8 02/22/2019 08:18 PM    aPTT 27.8 02/22/2019 08:18 PM      Lab Results   Component Value Date/Time    Iron 18 (L) 05/06/2022 11:13 AM    TIBC 173 (L) 05/06/2022 11:13 AM    Iron % saturation 10 (L) 05/06/2022 11:13 AM    Ferritin 426 (H) 05/06/2022 11:13 AM      No results found for: PH, PCO2, PO2  No components found for: GLPOC   No results found for: CPK, CKMB             Total time: 35 minutes

## 2023-02-13 NOTE — PROGRESS NOTES
[facility] Jose Juan [facility] [address]  [address] [place] [address]  [phone]        Hospitalist Progress Note      NAME: Mariposa Garay Sr.   :  1975  MRM:  [id number]    Date/Time: 2023  1:16 PM         Subjective:     Chief Complaint: \"I'm okay\"     Pt seen and examined. Reports some stool and flatus from ostomy. Tolerating clears. No other complaints other than chronic pain     ROS:  (bold if positive, if negative)    Ab pain        Objective:       Vitals:          Last 24hrs VS reviewed since prior progress note.  Most recent are:    Visit Vitals  BP (!) 154/90 (BP 1 Location: Right upper arm, BP Patient Position: Semi fowlers)   Pulse 94   Temp 97.5 °F (36.4 °C)   Resp 18   SpO2 96%     SpO2 Readings from Last 6 Encounters:   23 96%   23 96%   23 96%   23 99%   23 99%   23 99%          Intake/Output Summary (Last 24 hours) at 2023 1316  Last data filed at 2023 0600  Gross per 24 hour   Intake 1235 ml   Output 200 ml   Net 1035 ml          Exam:     Physical Exam:    Gen:  Well-developed, well-nourished, in no acute distress  HEENT:  Pink conjunctivae, PERRL, hearing intact to voice, moist mucous membranes  Neck:  Supple, without masses, thyroid non-tender  Resp:  No accessory muscle use, clear breath sounds without wheezes rales or rhonchi  Card:  No murmurs, normal S1, S2 without thrills, bruits or peripheral edema  Abd: ostomy with small amount of brown liquid   Musc:  No cyanosis or clubbing  Skin:  No rashes or ulcers, skin turgor is good  Neuro:  Cranial nerves 3-12 are grossly intact,  strength is 5/5 bilaterally and dorsi / plantarflexion is 5/5 bilaterally, follows commands appropriately  Psych:  flat affect     Medications Reviewed: (see below)    Lab Data Reviewed: (see below)    ______________________________________________________________________    Medications:     Current Facility-Administered Medications Medication Dose Route Frequency    hydrALAZINE (APRESOLINE) 20 mg/mL injection 10 mg  10 mg IntraVENous Q6H PRN    metoprolol tartrate (LOPRESSOR) tablet 25 mg  25 mg Oral Q12H    enoxaparin (LOVENOX) injection 40 mg  40 mg SubCUTAneous DAILY    ondansetron (ZOFRAN) injection 4 mg  4 mg IntraVENous Q6H PRN    sodium chloride (NS) flush 5-40 mL  5-40 mL IntraVENous Q8H    sodium chloride (NS) flush 5-40 mL  5-40 mL IntraVENous PRN    acetaminophen (TYLENOL) tablet 650 mg  650 mg Oral Q6H PRN    Or    acetaminophen (TYLENOL) suppository 650 mg  650 mg Rectal Q6H PRN    polyethylene glycol (MIRALAX) packet 17 g  17 g Oral DAILY PRN    lactated Ringers infusion  75 mL/hr IntraVENous CONTINUOUS    HYDROmorphone (DILAUDID) injection 1 mg  1 mg IntraVENous Q2H PRN            Lab Review:     No results for input(s): WBC, HGB, HCT, PLT, HGBEXT, HCTEXT, PLTEXT in the last 72 hours.   Recent Labs     02/13/23  1138 02/12/23  0023 02/11/23  0236   * 133* 131*   K 4.3 3.3* 3.6   CL 99 97 97   CO2 30 31 27   * 99 102*   BUN 12 24* 41*   CREA 0.57* 0.73 1.11   CA 8.5 8.3* 8.8   MG 1.6  --   --      No components found for: [place]         Assessment / Plan:   SBO   -management as per gen surgery   -defer diet advancement to surgery   -continue IVF's for now     HTN   -initially on IV metoprolol   -start PO metoprolol     Hypokalemia   -resolved    Follicular lymphoma/colon cancer  -appreciate palliative and heme/onc; pt determining if he would like to pursue additional treatment or pursue hospice; this remains TBD based on pts decision     ALMA - resolved  -monitor     Total time spent with patient: 28 [address] [facility] discussed with: Patient, heme/onc NP    Discussed:  Care Plan    Prophylaxis:  Lovenox    Disposition:  Home w/Family           ___________________________________________________    Attending Physician: Charisse Adams MD

## 2023-02-13 NOTE — PROGRESS NOTES
2/13/2023  3:46 PM  Care Management Progress Note      ICD-10-CM ICD-9-CM    1. SBO (small bowel obstruction) ([facility] [address] [facility]  K56.609 560.9       2. Acute renal failure, unspecified acute renal failure type ([facility] [address]  N17.9 584.9       3. Palliative care encounter  Z51.5 V66.7       4. Cancer related pain  G89.3 338.3       5. Acute abdominal pain  R10.9 789.00      338.19       6. Cancer of ascending colon metastatic to intra-abdominal lymph node (HCC)  C18.2 153.6     C77.2 196.2       7. Goals of care, counseling/discussion  Z71.89 V65.49           RUR:  15%  Risk Level: [x]Low []Moderate []High  Value-based purchasing: [] Yes [x] No  Bundle patient: [] Yes [x] No   Specify:     Transition of care plan:  Awaiting medical clearance and DC order. Oncology and gen surg following. TBD - Pt agreeable to a hospice info session, and indicated [facility]. CM submitted referral via [address], and is awaiting response. Outpatient follow-up. Pt's family to transport.

## 2023-02-13 NOTE — PROGRESS NOTES
Bedside shift change report given to Deepika Palomino (oncoming nurse) by  Nolvia Dozier (offgoing nurse). Report included the following information SBAR, OR Summary, Intake/Output, MAR, and Recent Results.

## 2023-02-13 NOTE — PROGRESS NOTES
2/13/2023  11:11 AM  Care Management Progress Note      ICD-10-CM ICD-9-CM    1. SBO (small bowel obstruction) ([facility] [address]  K56.609 560.9       2. Acute renal failure, unspecified acute renal failure type ([facility] [address]  N17.9 584.9       3. Palliative care encounter  Z51.5 V66.7       4. Cancer related pain  G89.3 338.3       5. Acute abdominal pain  R10.9 789.00      338.19       6. Cancer of ascending colon metastatic to intra-abdominal lymph node (HCC)  C18.2 153.6     C77.2 196.2       7. Goals of care, counseling/discussion  Z71.89 V65.49           RUR:  15%  Risk Level: [x]Low []Moderate []High  Value-based purchasing: [] Yes [x] No  Bundle patient: [] Yes [x] No   Specify:     Transition of care plan:  Awaiting medical clearance and DC order. HemOnc following. Gen surg following. Palliative following for care decisions. Advancing diet as tolerated. TBD pending medical progression. Outpatient follow-up. Pt's family to transport.

## 2023-02-13 NOTE — PROGRESS NOTES
General Surgery    Patient is a 41year-old man with history of high-grade follicular lymphoma s/p Obinutuzumab-CHOP in 2018 with Stage IV colon adenocarcinoma with peritoneal carcinomatosis and loop ileostomy in Feb 2022 on FOLFIRI/Panitumumab and history of small bowel obstruction who presented to the ER with abdominal pain and nausea/vomiting. CT abdomen/pelvis demonstrated worsening carcinomatosis, significantly distended stomach and multiple loops of small bowel consistent with small bowel obstruction and a parastomal hernia that does not appear to be the cause of the obstruction. HPI: Patient states he is doing better. Fran full liquids; denies nausea/vomiting/bloating but a little gassy. Continued output from the ileostomy. No family currently at bedside. No current facility-administered medications on file prior to encounter. Current Outpatient Medications on File Prior to Encounter   Medication Sig Dispense Refill    oxyCODONE ER (OxyCONTIN) 20 mg ER tablet Take 1 Tablet by mouth every eight (8) hours for 30 days. Max Daily Amount: 60 mg. 90 Tablet 0    oxyCODONE IR (ROXICODONE) 10 mg tab immediate release tablet Take 2 Tablets by mouth every four (4) hours as needed for Pain for up to 15 days. Max Daily Amount: 120 mg. 180 Tablet 0    gabapentin (NEURONTIN) 300 mg capsule TAKE 1 CAPSULE BY MOUTH NIGHTLY. MAX DAILY AMOUNT: 300 MG. INDICATIONS: NEUROPATHIC PAIN 30 Capsule 0    aspirin delayed-release 81 mg tablet Take 1 Tablet by mouth daily. 90 Tablet 1    clindamycin (CLEOCIN T) 1 % external solution Apply 1 mL to affected area two (2) times a day. use thin film on upper chest, back and face. Apply with cotton swab. 60 mL 1    atorvastatin (LIPITOR) 20 mg tablet Take 1 Tablet by mouth daily. 90 Tablet 4    potassium chloride (KLOR-CON M10) 10 mEq tablet Take 1 Tablet by mouth daily.  90 Tablet 1    ondansetron hcl (ZOFRAN) 8 mg tablet Take 1 Tablet by mouth every eight (8) hours as needed for Nausea or Vomiting. 30 Tablet 2    prochlorperazine (Compazine) 10 mg tablet Take 1 Tablet by mouth every six (6) hours as needed for Nausea or Vomiting. 30 Tablet 2    multivitamin (ONE A DAY) tablet Take 1 Tablet by mouth daily. loperamide (Imodium A-D) 2 mg tablet Take 1 Tablet by mouth three (3) times daily as needed for Diarrhea. (Patient not taking: No sig reported) 30 Tablet 0    lidocaine (LIDODERM) 5 % 1 Patch by TransDERmal route every twenty-four (24) hours. Apply patch to the abdomen for 12 hours a day and remove for 12 hours a day. (Patient not taking: Reported on 2/10/2023) 10 Each 1    lidocaine-prilocaine (EMLA) topical cream Apply a dime size amount to port site 30 minutes before treatment to prevent pain. (Patient not taking: Reported on 2/10/2023) 30 g 2    dexAMETHasone (DECADRON) 4 mg tablet Take 8mg (2 x tabs) on days 2 and 3 after treatment to prevent nausea. Take in the AM with food. 30 Tablet 3       Patient Vitals for the past 24 hrs:   Temp Pulse Resp BP SpO2   02/13/23 0819 97.9 °F (36.6 °C) 86 18 (!) 162/87 97 %   02/13/23 0442 -- -- -- (!) 153/95 --   02/13/23 0429 99.7 °F (37.6 °C) 90 18 (!) 169/79 96 %   02/13/23 0419 -- 78 -- (!) 171/105 --   02/13/23 0306 97.6 °F (36.4 °C) 73 18 (!) 173/84 96 %   02/12/23 2318 98.6 °F (37 °C) 90 18 (!) 153/100 96 %   02/12/23 1932 97.4 °F (36.3 °C) 84 18 (!) 162/94 96 %   02/12/23 1535 97.8 °F (36.6 °C) 89 18 (!) 156/97 97 %   02/12/23 1149 97.5 °F (36.4 °C) 89 16 (!) 148/90 --     Date 02/12/23 0700 - 02/13/23 0659 02/13/23 0700 - 02/14/23 0659   Shift 1160-4525 9608-5448 24 Hour Total 6977-8638 6764-0889 24 Hour Total   INTAKE   P.O. 730  730        P. O. 730  730      I. V.  785 785        Volume (lactated Ringers infusion)  785 785      Shift Total       OUTPUT   Stool 200  200        Output (ml) (Ileostomy 02/10/23 Right  Abdomen) 200  200      Shift Total 200  200       279 1910      Weight (kg)           GEN: no acute distress; more alert today  ABD: soft; (+) epigastric tenderness, pt states at known met location; min distended; well-healed port site scars; ileostomy pink w/ succus in the bag       A/P: Patient is a 41year-old man with history of high-grade follicular lymphoma s/p Obinutuzumab-CHOP in 2018 with Stage IV colon adenocarcinoma with peritoneal carcinomatosis and loop ileostomy in Feb 2022 on FOLFIRI/Panitumumab and history of small bowel obstruction admitted for recurrent small bowel obstruction. -- Pt w/ valid concern about advancing diet too quickly; despite tolerating full liquids, he would like to remain on fulls today and re-assess tomorrow for GI softs. -- High surgical risk. Jose Luis Apodaca.  Kaye Chatman MD, FACS

## 2023-02-13 NOTE — PROGRESS NOTES
Problem: Falls - Risk of  Goal: *Absence of Falls  Description: Document Billy Mccormack Fall Risk and appropriate interventions in the flowsheet. Outcome: Progressing Towards Goal  Note: Fall Risk Interventions:                                Problem: Patient Education: Go to Patient Education Activity  Goal: Patient/Family Education  Outcome: Progressing Towards Goal     Problem: Pressure Injury - Risk of  Goal: *Prevention of pressure injury  Description: Document Asim Scale and appropriate interventions in the flowsheet. Outcome: Progressing Towards Goal  Note: Pressure Injury Interventions:             Activity Interventions: Increase time out of bed         Nutrition Interventions: Document food/fluid/supplement intake

## 2023-02-13 NOTE — ACP (ADVANCE CARE PLANNING)
Primary Decision MakerSgutierreze Nicholas - [phone]  Advance Care Planning 2/13/2023   Patient's Healthcare Decision Maker is: Legal Next of Kin   Confirm Advance Directive None   Patient Would Like to Complete Advance Directive No   Does the patient have other document types Do Not Resuscitate      Pt does not have AMD on file; wife Usha Broussard is legal NOK and would be surrogate decision maker if pt is unable to speak for himself. Pt made decision for DNR with wife's support; DDNR was completed on this date. Original was returned to pt, copy was placed on chart to be scanned into medical record.

## 2023-02-13 NOTE — HOSPICE
Hospice RN Note: consult received. Reviewing chart. Briefly discussed with palliative medicine team. Plan for hospice info session tomorrow.

## 2023-02-13 NOTE — PROGRESS NOTES
Palliative Medicine      Code Status: DNR    Advance Care Planning:  Advance Care Planning 2/13/2023   Patient's Healthcare Decision Maker is: Legal Next of Kin   Confirm Advance Directive None   Patient Would Like to Complete Advance Directive No   Does the patient have other document types Do Not Resuscitate       Patient / Family Encounter Documentation    Participants (names): Pt, wife Claude Gerlach, Palliative Medicine (NP [facility])    Narrative:  Pt was awake in bed, more alert than during prior Palliative visit. Clarified marital status, as chart identifies Claude Gerlach both as girlfriend and wife. Pt stated he and Claude Gerlach got  in August; Claude Gerlach shared that she just had her name officially changed today. Pt does not have AMD in place, confirmed that Claude Gerlach would be the person he trusts to speak on his behalf if he is unable to speak for himself. Claude Gerlach had not been present when Oncologist Dr. Shaniqua Boykin spoke with pt earlier today but indicated that hospice might be of benefit to pt and family. Pt initially stated he does not need hospice yet as he is able to care for himself, was educated re: how hospice might be of benefit now rather than waiting for a crisis to occur. Code status was addressed, with medical recommendation for DNR in light of advanced cancer. Claude Gerlach was in support of DNR; pt ultimately made decision for DNR and completed DDNR. Psychosocial Issues Identified/ Resilience Factors:  Anticipatory grief, Claude Gerlach was emotional but very realistic re: poor prognosis, is giving thought to how family will manage after pt's death. Pt and Claude Gerlach have a two year old child in the home; pt has two older kids (18+ yrs of age) from prior marriage. Pt has a court date tomorrow; letter was provided for  re: current hospitalization. No spiritual concerns expressed. Caregiver [place]: Pt is independent with ADLs at this time.   Does the caregiver feel confident administering medication? Pt can self administer at this time. Does the caregiver need any help connecting with community resources? Requests hospice info session  Does the caregiver feel confident assisting with activities of daily living? Pt is able to perform his own ADLs at this time. Goals of Care / Plan: Hospice info session has been ordered per wife Radha's request; pt was agreeable to meet with hospice liaison as he weighs out his options. Discussed with Oncology NP, Care Manager and [employer] system [facility]. DDNR was completed on this date. ANTWAN will continue to follow. Thank you for including Palliative Medicine in the care of Mr. Jake Magdaleno.      deven  JASMYNE Georges, [facility]-  [phone])

## 2023-02-14 ENCOUNTER — APPOINTMENT (OUTPATIENT)
Dept: GENERAL RADIOLOGY | Age: 48
DRG: 240 | End: 2023-02-14
Attending: INTERNAL MEDICINE
Payer: MEDICAID

## 2023-02-14 ENCOUNTER — APPOINTMENT (OUTPATIENT)
Dept: INFUSION THERAPY | Age: 48
End: 2023-02-14
Payer: MEDICAID

## 2023-02-14 VITALS
DIASTOLIC BLOOD PRESSURE: 82 MMHG | HEART RATE: 89 BPM | TEMPERATURE: 97.6 F | SYSTOLIC BLOOD PRESSURE: 122 MMHG | OXYGEN SATURATION: 96 % | RESPIRATION RATE: 18 BRPM

## 2023-02-14 PROCEDURE — 74018 RADEX ABDOMEN 1 VIEW: CPT

## 2023-02-14 PROCEDURE — 74011250636 HC RX REV CODE- 250/636: Performed by: HOSPITALIST

## 2023-02-14 PROCEDURE — 74011250637 HC RX REV CODE- 250/637: Performed by: NURSE PRACTITIONER

## 2023-02-14 PROCEDURE — 74011250636 HC RX REV CODE- 250/636: Performed by: NURSE PRACTITIONER

## 2023-02-14 PROCEDURE — 74011000250 HC RX REV CODE- 250: Performed by: INTERNAL MEDICINE

## 2023-02-14 PROCEDURE — 99233 SBSQ HOSP IP/OBS HIGH 50: CPT | Performed by: INTERNAL MEDICINE

## 2023-02-14 PROCEDURE — 74011250637 HC RX REV CODE- 250/637: Performed by: INTERNAL MEDICINE

## 2023-02-14 RX ORDER — ENEMA 19; 7 G/133ML; G/133ML
2 ENEMA RECTAL
Qty: 133 ML | Refills: 2 | Status: SHIPPED | OUTPATIENT
Start: 2023-02-14 | End: 2023-02-14 | Stop reason: SDUPTHER

## 2023-02-14 RX ORDER — ENEMA 19; 7 G/133ML; G/133ML
1 ENEMA RECTAL
Qty: 133 ML | Refills: 2 | Status: SHIPPED | OUTPATIENT
Start: 2023-02-14 | End: 2023-02-14

## 2023-02-14 RX ORDER — HEPARIN 100 UNIT/ML
300 SYRINGE INTRAVENOUS AS NEEDED
Status: DISCONTINUED | OUTPATIENT
Start: 2023-02-14 | End: 2023-02-14 | Stop reason: HOSPADM

## 2023-02-14 RX ORDER — HEPARIN SODIUM (PORCINE) LOCK FLUSH IV SOLN 100 UNIT/ML 100 UNIT/ML
300 SOLUTION INTRAVENOUS AS NEEDED
Status: DISCONTINUED | OUTPATIENT
Start: 2023-02-14 | End: 2023-02-14 | Stop reason: SDUPTHER

## 2023-02-14 RX ADMIN — ENOXAPARIN SODIUM 40 MG: 100 INJECTION SUBCUTANEOUS at 08:50

## 2023-02-14 RX ADMIN — OXYCODONE HYDROCHLORIDE 20 MG: 20 TABLET, FILM COATED, EXTENDED RELEASE ORAL at 15:33

## 2023-02-14 RX ADMIN — SODIUM CHLORIDE, POTASSIUM CHLORIDE, SODIUM LACTATE AND CALCIUM CHLORIDE 75 ML/HR: 600; 310; 30; 20 INJECTION, SOLUTION INTRAVENOUS at 11:59

## 2023-02-14 RX ADMIN — OXYCODONE HYDROCHLORIDE 20 MG: 20 TABLET, FILM COATED, EXTENDED RELEASE ORAL at 05:46

## 2023-02-14 RX ADMIN — SODIUM CHLORIDE, PRESERVATIVE FREE 10 ML: 5 INJECTION INTRAVENOUS at 05:46

## 2023-02-14 RX ADMIN — SODIUM CHLORIDE, PRESERVATIVE FREE 10 ML: 5 INJECTION INTRAVENOUS at 15:33

## 2023-02-14 RX ADMIN — HYDROMORPHONE HYDROCHLORIDE 1 MG: 1 INJECTION, SOLUTION INTRAMUSCULAR; INTRAVENOUS; SUBCUTANEOUS at 08:51

## 2023-02-14 RX ADMIN — METOPROLOL TARTRATE 25 MG: 25 TABLET, FILM COATED ORAL at 08:51

## 2023-02-14 NOTE — PALLIATIVE CARE DISCHARGE
The Palliative Medicine team was consulted as part of your / your loved one's care in the hospital. Our team is a supportive service; we strive to relieve suffering and improve quality of life. You identified the following goal(s) as your main focus for healthcare: Patient/Health Care Proxy Stated Goals: You are still weighing your options but are eager to go home. Hospice has been discussed; you were not ready to make a decision quite yet. We reviewed advance care planning information, which includes the following:  Advance Care Planning 2/13/2023   Patient's Healthcare Decision Maker is: Legal Next of Kin   Confirm Advance Directive None   Patient Would Like to Complete Advance Directive No   Does the patient have other document types Do Not Resuscitate       We reviewed / discussed your code status as: DNR     Full Code means perform CPR in the event of cardiac arrest     [facility] means do NOT perform CPR in the event of cardiac arrest     Partial Code means you have specific preferences, please discuss with your health care team     No Order means this issue was not addressed / resolved during your stay    You have a Durable Do Not Resuscitate Order in place, which should travel with you. When you are in a facility, this form should be placed on your chart. Once you are home, it is recommended that the [facility] form be placed in a visible location such as on the refrigerator or bedroom door. Because of the importance of this information, we are providing you with a printed copy to share with other healthcare providers after this hospitalization is complete. If any of the above information is incomplete or incorrect, please contact the Palliative Medicine team at [phone].

## 2023-02-14 NOTE — PROGRESS NOTES
General Surgery    Patient is a 41year-old man with history of high-grade follicular lymphoma s/p Obinutuzumab-CHOP in 2018 with Stage IV colon adenocarcinoma with peritoneal carcinomatosis and loop ileostomy in Feb 2022 on FOLFIRI/Panitumumab and history of small bowel obstruction who presented to the ER with abdominal pain and nausea/vomiting. CT abdomen/pelvis demonstrated worsening carcinomatosis, significantly distended stomach and multiple loops of small bowel consistent with small bowel obstruction and a parastomal hernia that does not appear to be the cause of the obstruction. HPI: Patient states he is doing better; really wants to go home. Fran full liquids; denies nausea/vomiting/bloating. Continued output from the ileostomy. No family currently at bedside. No current facility-administered medications on file prior to encounter. Current Outpatient Medications on File Prior to Encounter   Medication Sig Dispense Refill    oxyCODONE ER (OxyCONTIN) 20 mg ER tablet Take 1 Tablet by mouth every eight (8) hours for 30 days. Max Daily Amount: 60 mg. 90 Tablet 0    oxyCODONE IR (ROXICODONE) 10 mg tab immediate release tablet Take 2 Tablets by mouth every four (4) hours as needed for Pain for up to 15 days. Max Daily Amount: 120 mg. 180 Tablet 0    gabapentin (NEURONTIN) 300 mg capsule TAKE 1 CAPSULE BY MOUTH NIGHTLY. MAX DAILY AMOUNT: 300 MG. INDICATIONS: NEUROPATHIC PAIN 30 Capsule 0    aspirin delayed-release 81 mg tablet Take 1 Tablet by mouth daily. 90 Tablet 1    clindamycin (CLEOCIN T) 1 % external solution Apply 1 mL to affected area two (2) times a day. use thin film on upper chest, back and face. Apply with cotton swab. 60 mL 1    atorvastatin (LIPITOR) 20 mg tablet Take 1 Tablet by mouth daily. 90 Tablet 4    potassium chloride (KLOR-CON M10) 10 mEq tablet Take 1 Tablet by mouth daily.  90 Tablet 1    ondansetron hcl (ZOFRAN) 8 mg tablet Take 1 Tablet by mouth every eight (8) hours as needed for Nausea or Vomiting. 30 Tablet 2    prochlorperazine (Compazine) 10 mg tablet Take 1 Tablet by mouth every six (6) hours as needed for Nausea or Vomiting. 30 Tablet 2    multivitamin (ONE A DAY) tablet Take 1 Tablet by mouth daily. loperamide (Imodium A-D) 2 mg tablet Take 1 Tablet by mouth three (3) times daily as needed for Diarrhea. (Patient not taking: No sig reported) 30 Tablet 0    lidocaine (LIDODERM) 5 % 1 Patch by TransDERmal route every twenty-four (24) hours. Apply patch to the abdomen for 12 hours a day and remove for 12 hours a day. (Patient not taking: Reported on 2/10/2023) 10 Each 1    lidocaine-prilocaine (EMLA) topical cream Apply a dime size amount to port site 30 minutes before treatment to prevent pain. (Patient not taking: Reported on 2/10/2023) 30 g 2    dexAMETHasone (DECADRON) 4 mg tablet Take 8mg (2 x tabs) on days 2 and 3 after treatment to prevent nausea. Take in the AM with food. 30 Tablet 3       Patient Vitals for the past 24 hrs:   Temp Pulse Resp BP SpO2   02/14/23 1141 97.9 °F (36.6 °C) 87 16 119/85 94 %   02/14/23 0820 98.3 °F (36.8 °C) 96 18 110/75 94 %   02/14/23 0547 97.7 °F (36.5 °C) 94 18 128/76 94 %   02/13/23 2154 98.4 °F (36.9 °C) 95 18 139/86 95 %   02/13/23 1607 97.6 °F (36.4 °C) 89 18 (!) 159/97 96 %     Date 02/13/23 0700 - 02/14/23 0659 02/14/23 0700 - 02/15/23 0659   Shift 9601-8332 5996-4946 24 Hour Total 2414-8094 8058-0325 24 Hour Total   INTAKE   P.O.  120 120 720  720     P. O.  120 120 720  720   I.V.  1830 1830        Volume (lactated Ringers infusion)  1830 1830      Shift Total  1950 1950 720  720   OUTPUT   Urine           Urine Occurrence(s)  1 x 1 x      Stool  200 200        Output (ml) (Ileostomy 02/10/23 Right  Abdomen)  200 200      Shift Total  200 200      NET  1750 1750 720  720   Weight (kg)           GEN: no acute distress  ABD: soft; (+) epigastric tenderness, pt states at known met location; some increased distension; well-healed port site scars; ileostomy pink w/ succus in the bag       A/P: Patient is a 41year-old man with history of high-grade follicular lymphoma s/p Obinutuzumab-CHOP in 2018 with Stage IV colon adenocarcinoma with peritoneal carcinomatosis and loop ileostomy in Feb 2022 on FOLFIRI/Panitumumab and history of small bowel obstruction admitted for recurrent small bowel obstruction. -- Pt lillie full liquids (mostly what he eats at home anyway) and maintaining baseline ileostomy output. While pt does have dilated loops of small bowel on KUB, clinically he has improved. Given the extent of disease in his abdomen, he may now be having chronic obstruction with this admission representing an acute on chronic picture. Given his clinical improvement, OK w/ d/c home, particularly as pt is still deciding re: hospice enrollment. -- For significant stool burden in the colon, recommend suppository or enema to distally evacuate his colon and rectum: w/ loop, rather than end, ileostomy, some succus is still entering the colon. Would not give stool softeners/laxatives orally at this time given partial obstruction even with the clinical improvement. -- High surgical risk. Alan Beltran.  Fernanda Tolentino MD, FACS

## 2023-02-14 NOTE — PROGRESS NOTES
[facility] at [address], [address]  W: [phone]  F: [phone]      Reason for Visit:   Paolo Blair is a 41 y.o. male who is seen for evaluation of Stage IV colon cancer with carcinomatosis. Hematology/Oncology Treatment History:     Diagnosis #1: Follicular lymphoma  Stage: IV  Pathology:   11/13/18 right inguinal LN excision: Follicular lymphoma, high-grade (grade 3a of 3). Prior Treatment:   1. Obinutuzumab-CHOP. Obinutuzumab: 1000 mg weekly on days 1, 8, 15 for cycle 1, then 1000 mg on day 1 q21 days for cycles 2-6, then monotherapy 1000 mg every 21 days for cycle 7, 8 with Cyclophosphamide 750mg/m2, Doxorubicin 50mg/m2, Vincristine 1.4mg/m2 on day 1 and Prednisone 100mg on Days 1-5, every 21 days for a total of 2 cycles completed late 1/2019. Regimen discontinued due to STEMI. 2. Obinutuzumab + Bendamustine: 1000 mg Obinutuzumab on day 1 + Bendamustine 90mg/m2 on days 1-2 on a 28-day cycle x 4 cycles, completed 5/2019  Current Treatment: Surveillance           Diagnosis #2: Colon cancer:  Stage: IV  Pathology:    2/19/22 Ascending colon; biopsy: poorly differentiated carcinoma  Comment: No dysplasia is identified. Immunohistochemical stains performed on block 1A, show the tumor positive for Cam5.2 and shows patchy positivity for CDX2 with a Ki-67 index of -90%; the tumor is focally positive for CK5/6 and GATA3; negative for p63, Pax8, CK7, CK20, panmelanoma, synaptophysin and chromogranin. The immunophenotypic features are nonspecific but argues somewhat against the following carcinoma: urothelial, neuroendocrine and breast. The immunophenotypic features also argues against melanoma and somewhat against classic colorectal carcinoma.   Additional immunohistochemical stains to exclude the possibility of prostate and hepatocellular carcinoma have been   ordered; the results will be reported as an addendum. -MLH1/MSH2/MSH6/PMS2 all intact with no loss of nuclear expression of MMR proteins - no MSI   Addendum: The addendum is issued to report the results of additional immunohistochemical stains in an attempt to ascertain the primary site of the carcinoma. The diagnosis is unchanged. Hepar and glypican 3 immunohistochemical stains are neg, arguing against hepatocellular carcinoma. PSA stain is negative, arguing against prostatic primary. Imaging studies to eval for poss primary sites maybe useful. In the absence of carcinoma/tumor at any other sites, this may represent an undifferentiated carcinoma of the colon.  -Oncogenomics (molecular) studies: POLE< ARID1A, YVONNE, ATR, BRCA2, CHECK1, FANCI, NF1, PIK3CA, PTCH1, PTEN, RAD50, RAD51, RB1, SMARCA4, STK11  -Neogenomics: KRAS exon 2 and exon 3 not detected, a VUS p. E98K is detected in Exon 4 of KRAS, PD-L1 28-8 (Opdivo) for gastric/GEJ/EAC no expression, PD-L1 (22C3 pharmDx) TPS 1% (pembrolizumab), BRAF mutation not detected, NRAS exon 2 not detected, NRAS 3 not detected, NRAS 4 not detected. Prior Treatment:   1. Loop ileostomy 2/19/22  2. Diagnostic laparoscopy with peritoneal biopsy, 4/27/22  3. FOLFOXIRI every 2 weeks until disease progression or toxicity, 5/16/22 - 12/2022. Current Treatment: FOLFIRI + Panitumumab (6 mg/kg) every 14 days, to start 1/9/23     Oncologic History:  Was in his usual state of health in early November 2018 when he developed low back pain and presented to the ER. Work-up at outside hospital revealed a retroperitoneal mass seen on CT imaging, and he was transferred to [facility] for further work-up. CT there showed a large retroperitoneal mass encircling the aorta with invasion of the left renal hilum and left adrenal gland. There were bilateral inguinal lymph nodes and moderate left hydronephrosis. He was evaluated while at [facility] and was noted to have palpable nodes in his groin for approximately the past 1 month. He underwent excisional LN biopsy of right inguinal LN, which revealed follicular lymphoma. At time of diagnosis, he had no cardiac disease aside from HTN, hyperlipidemia. However, he did have an NSTEMI after cycle 2 of O-CHOP. Was likely unrelated to chemotherapy, but opted to switch treatment to Obinutuzumab-Bendamustine. He completed treatment in 5/2019 and had a CR based on PET. History of Present Illness:   Thurmond Litten Sr. is a pleasant 41 y.o. male with metastatic colon cancer who was admitted on 2/10/23 for N/V, abdominal pain. Pt was evaluated in our outpatient infusion center [facility]) yesterday for N/V despite not having chemotherapy for 2.5 weeks. Given concern for bowel obstruction, patient was directed to ED for further eval. His n/v started Tuesday and he noted decrease in ostomy output since Monday. ED labs notable for Na 127, Cr 2.56. ED CT A/P imaging reveals small bowel obstruction with worsening of carcinomatosis. Pt reports feels \"miserable\". Rates pain 5/10. Verbalizing little; eyes closed. Pt was recently switched from FOLFIRINOX chemotherapy to FOLFIRI + Panitumumab approx 5 weeks ago. Had NG tube placed and no current vomiting. No family at bedside. Interval History: Tolerating full liquid diet; denies N/V. Pain is better controlled . Hoping to go home today. More talkative and engaging today     No family at bedside.      Current Facility-Administered Medications   Medication Dose Route Frequency    hydrALAZINE (APRESOLINE) 20 mg/mL injection 10 mg  10 mg IntraVENous Q6H PRN    metoprolol tartrate (LOPRESSOR) tablet 25 mg  25 mg Oral Q12H    gabapentin (NEURONTIN) capsule 300 mg  300 mg Oral QHS    oxyCODONE ER (OxyCONTIN) tablet 20 mg  20 mg Oral Q8H    oxyCODONE IR (ROXICODONE) tablet 20 mg  20 mg Oral Q4H PRN    HYDROmorphone (DILAUDID) injection 1 mg  1 mg IntraVENous Q6H PRN    enoxaparin (LOVENOX) injection 40 mg  40 mg SubCUTAneous DAILY    ondansetron (ZOFRAN) injection 4 mg  4 mg IntraVENous Q6H PRN    sodium chloride (NS) flush 5-40 mL  5-40 mL IntraVENous Q8H    sodium chloride (NS) flush 5-40 mL  5-40 mL IntraVENous PRN    acetaminophen (TYLENOL) tablet 650 mg  650 mg Oral Q6H PRN    Or    acetaminophen (TYLENOL) suppository 650 mg  650 mg Rectal Q6H PRN    polyethylene glycol (MIRALAX) packet 17 g  17 g Oral DAILY PRN    lactated Ringers infusion  75 mL/hr IntraVENous CONTINUOUS       No Known Allergies       Review of Systems: A complete review of systems was obtained, reviewed. Pertinent findings reviewed above. Physical Exam:   Visit Vitals  /75 (BP 1 Location: Right upper arm, BP Patient Position: At rest)   Pulse 96   Temp 98.3 °F (36.8 °C)   Resp 18   SpO2 94%     ECOG PS: 2  General: frail, cachectic;   Eyes:  anicteric sclera  Neck: supple  Respiratory normal respiratory effort  CV: port noted to upper chest area; no peripheral edema  GI: soft, nontender, nondistended. Ostomy noted with liquid brown drainage. MS: digits without clubbing or cyanosis. Skin: no rashes, no ecchymoses, no petechia. normal temperature, turgor, and texture. Psych: alert, oriented, appropriate affect, normal judgment/insight    Results:     Lab Results   Component Value Date/Time    WBC 7.1 02/09/2023 06:16 PM    HGB 12.2 02/09/2023 06:16 PM    HCT 36.1 (L) 02/09/2023 06:16 PM    PLATELET 383 75/38/2379 06:16 PM    MCV 92.8 02/09/2023 06:16 PM    ABS.  NEUTROPHILS 5.5 02/09/2023 06:16 PM    Hemoglobin (POC) 15.0 06/05/2009 02:13 PM    Hematocrit (POC) 39 02/14/2019 01:24 PM     Lab Results   Component Value Date/Time    Sodium 133 (L) 02/13/2023 11:38 AM    Potassium 4.3 02/13/2023 11:38 AM    Chloride 99 02/13/2023 11:38 AM    CO2 30 02/13/2023 11:38 AM    Glucose 101 (H) 02/13/2023 11:38 AM    BUN 12 02/13/2023 11:38 AM    Creatinine 0.57 (L) 02/13/2023 11:38 AM    GFR est AA >60 10/03/2022 07:50 AM    GFR est non-AA >60 10/03/2022 07:50 AM    Calcium 8.5 02/13/2023 11:38 AM    Sodium (POC) 136 02/14/2019 01:24 PM    Potassium (POC) 3.9 02/14/2019 01:24 PM    Chloride (POC) 102 02/14/2019 01:24 PM    Glucose (POC) 249 (H) 02/15/2019 10:21 PM    BUN (POC) 14 02/14/2019 01:24 PM    Creatinine (POC) 0.9 02/14/2019 01:24 PM    Calcium, ionized (POC) 1.24 02/14/2019 01:24 PM     Lab Results   Component Value Date/Time    Bilirubin, total 0.7 02/06/2023 07:52 AM    ALT (SGPT) 13 02/06/2023 07:52 AM    Alk. phosphatase 296 (H) 02/06/2023 07:52 AM    Protein, total 6.8 02/06/2023 07:52 AM    Albumin 2.8 (L) 02/06/2023 07:52 AM    Globulin 4.0 02/06/2023 07:52 AM     Lab Results   Component Value Date/Time    Iron % saturation 10 (L) 05/06/2022 11:13 AM    TIBC 173 (L) 05/06/2022 11:13 AM    Ferritin 426 (H) 05/06/2022 11:13 AM     02/15/2022 02:33 PM    Beta-2 Microglobulin, serum 2.5 (H) 12/28/2018 10:00 AM    TSH 1.53 09/28/2016 04:40 AM    Lipase 49 (L) 02/09/2023 06:16 PM    Hep C virus Ab Interp. NONREACTIVE 12/27/2018 04:53 PM    HIV 1/2 Interpretation NONREACTIVE 12/28/2018 10:00 AM       Lab Results   Component Value Date/Time    INR 1.1 02/22/2019 08:18 PM    aPTT 27.8 02/22/2019 08:18 PM      Lab Results   Component Value Date/Time    CEA 43.2 01/09/2023 08:09 AM     02/15/2022 02:33 PM    HCG, beta, QT <1 11/10/2018 03:41 AM    AFP, Serum, Tumor Marker 4.1 11/10/2018 03:41 AM      2/9/2023 CT A/P WO CONT  IMPRESSION  Small bowel obstruction with worsening of carcinomatosis. 2/10/23 XR ABD   IMPRESSION: Nasogastric tube appears to be in satisfactory position. 2/12/23 KUB: IMPRESSION   Unchanged small bowel obstruction. No visible enteric tube. Test results above have been reviewed. Assessment/Recommendations:       Undifferentiated carcinoma of transverse colon, metastatic:   SUZANNE, PDL1 1%, BRAF/NRAS negative, possibly KRAS wild-type  S/p diverting ileostomy due to large bowel obstruction. Colonoscopy with biopsy on 2/25/22.  No obvious metastatic disease on CT imaging, but did have obvious metastatic disease to peritoneum during laparoscopy with Dr. Kinza Armenta. Initially treated with  FOLFOXIRI every 2 weeks. CT after C9 showed good response with decrease in size of colon mass, but increased peritoneal implants. Current treatment with second line therapy  FOLFIRI + Panitumumab every 2 weeks. 2/6/23  C3 FOLFIRI + Panitumumab delayed due to neutropenia; rescheduled for 2/13/22 . Unfortunately now admitted with small bowel obstruction and  worsening carcinomatosis. Have reviewed with patient that SBO in the setting metastatic cancer, especially carcinomatosis, is quite difficult to treat. It is likely to be recurrent and is unlikely to respond to any treatments. Pt is improving symptom wise but not radio graphically; pt is  tolerating clear liquids and colostomy having stool. Dr Jason Sesay reviewed additional tx options this am; oral regimen vs immunotherapy vs support of hospice. Oral tx not ideal in setting of SBO and takes time to work; immunotherapy also slow to work. Pt would like to discuss with family but shares it will more than likely not be until he is home. -- Discussed that further therapy is unlikely to improve his disease, but that switching to immunotherapy is an option if he wants to keep trying treatment. He will continue to think about this. -- Outpatient HemOnc visit 2/17/23; reviewed with pt and placed in discharge summary. -- Plan for hospice info session today. 2. Small bowel obstruction / ALMA:  General surgery following - not a great candidate for surgery. No discrete transition point suspected. ALMA 2/2 decreased PO intake, n/v.  -- Supportive care per surg team; NGT out; tolerating full liquid diet; ostomy producing liquid stool. -- repeat KUB today. 3. Chronic lumbar back pain / anxiety / RLQ / acute right upper quadrant and umbilical pain:   Left lower back pain is chronic/stable.  RLQ is likely related to neoplasm/colostomy/parastomal hernia. Evaluated by Dr. Carlos Dorsey who believes umbilical burning pain is due to tumor implants. -- Follows  with Dr. Heriberto Palomo palliative outpatient   -- Following with Dr. Shawna Bardales and receiving 3-4 weeks of daily palliative radiation to tumor implants. 4. H/o Follicular lymphoma:   Completed treatment 5/2019. End of treatment PET 6/3/19 showed CR. No extra surveillance needed at this time given metastatic colon cancer with frequent imaging. Current imaging shows slight increase in soft tissue density in deep pelvis on 10/2022. Will continue to monitor for colon cancer follow up imaging. 5. CAD / HTN:   h/o STEMI 2/14/19 with TOM placed to proximal LAD. Follows with Dr. Haile Carrion. On ASA, Lipitor. 6. Goals of care:   Disease is incurable and will be difficult to treat if pt has recurrent SBO. Pt waiting to discuss with family members tx options. --Palliative team following.     Plan reviewed with Dr Darius Ni By: Elton Blanc NP

## 2023-02-14 NOTE — HOSPICE
Hospice RN Note: long d/w wife, Manjinder Conteh today about what hospice in the home would look like and how we can support. She is very much overwhelmed, struggling with childcare and some financial aspects but has a better understanding of how hospice is a support system to support a comfortable transition to end of life. She knows that ultimately it is her 's decision at this time and was appreciative of the information. He has agreed to continue the hospice discussions once he's home and if he is needing more support she thinks he will be amenable. I gave her my direct number to call and our 24hr contact info as well. We will be following up in the next few days to check in and see how they are doing at home. Spoke with palliative medicine SW and updated CM on discussions.

## 2023-02-14 NOTE — PROGRESS NOTES
Problem: Falls - Risk of  Goal: *Absence of Falls  Description: Document Oanh Steward Fall Risk and appropriate interventions in the flowsheet. Outcome: Progressing Towards Goal  Note: Fall Risk Interventions:                                Problem: Patient Education: Go to Patient Education Activity  Goal: Patient/Family Education  Outcome: Progressing Towards Goal     Problem: Pressure Injury - Risk of  Goal: *Prevention of pressure injury  Description: Document Asim Scale and appropriate interventions in the flowsheet. Outcome: Progressing Towards Goal  Note: Pressure Injury Interventions:             Activity Interventions: Increase time out of bed, Pressure redistribution bed/mattress(bed type), PT/OT evaluation         Nutrition Interventions: Document food/fluid/supplement intake    Friction and Shear Interventions: Feet elevated on foot rest, Foam dressings/transparent film/skin sealants, Lift sheet, Lift team/patient mobility team, Minimize layers                Problem: Patient Education: Go to Patient Education Activity  Goal: Patient/Family Education  Outcome: Progressing Towards Goal     Problem: Patient Education: Go to Patient Education Activity  Goal: Patient/Family Education  Outcome: Progressing Towards Goal     Problem: Small Bowel Obstruction: Day 1  Goal: Off Pathway (Use only if patient is Off Pathway)  Outcome: Progressing Towards Goal  Goal: Activity/Safety  Outcome: Progressing Towards Goal  Goal: Consults, if ordered  Outcome: Progressing Towards Goal  Goal: Diagnostic Test/Procedures  Outcome: Progressing Towards Goal  Goal: Nutrition/Diet  Outcome: Progressing Towards Goal  Goal: Medications  Outcome: Progressing Towards Goal  Goal: Respiratory  Outcome: Progressing Towards Goal  Goal: Treatments/Interventions/Procedures  Outcome: Progressing Towards Goal  Goal: Psychosocial  Outcome: Progressing Towards Goal  Goal: *Optimal pain control at patient's stated goal  Outcome: Progressing Towards Goal  Goal: *Adequate urinary output (equal to or greater than 30 milliliters/hour)  Outcome: Progressing Towards Goal  Goal: *Hemodynamically stable  Outcome: Progressing Towards Goal  Goal: *Demonstrates progressive activity  Outcome: Progressing Towards Goal  Goal: *Absence of nausea/vomiting  Outcome: Progressing Towards Goal     Problem: Small Bowel Obstruction: Day 2  Goal: Off Pathway (Use only if patient is Off Pathway)  Outcome: Progressing Towards Goal  Goal: Activity/Safety  Outcome: Progressing Towards Goal  Goal: Consults, if ordered  Outcome: Progressing Towards Goal  Goal: Diagnostic Test/Procedures  Outcome: Progressing Towards Goal  Goal: Nutrition/Diet  Outcome: Progressing Towards Goal  Goal: Discharge Planning  Outcome: Progressing Towards Goal  Goal: Medications  Outcome: Progressing Towards Goal  Goal: Respiratory  Outcome: Progressing Towards Goal  Goal: Treatments/Interventions/Procedures  Outcome: Progressing Towards Goal  Goal: Psychosocial  Outcome: Progressing Towards Goal  Goal: *Optimal pain control at patient's stated goal  Outcome: Progressing Towards Goal  Goal: *Adequate urinary output (equal to or greater than 30 milliliters/hour)  Outcome: Progressing Towards Goal  Goal: *Hemodynamically stable  Outcome: Progressing Towards Goal  Goal: *Demonstrates progressive activity  Outcome: Progressing Towards Goal  Goal: *Absence of nausea/vomiting  Outcome: Progressing Towards Goal  Goal: *Return of normal bowel function  Outcome: Progressing Towards Goal     Problem: Small Bowel Obstruction: Day 3  Goal: Off Pathway (Use only if patient is Off Pathway)  Outcome: Progressing Towards Goal  Goal: Activity/Safety  Outcome: Progressing Towards Goal  Goal: Consults, if ordered  Outcome: Progressing Towards Goal  Goal: Diagnostic Test/Procedures  Outcome: Progressing Towards Goal  Goal: Nutrition/Diet  Outcome: Progressing Towards Goal  Goal: Discharge Planning  Outcome: Progressing Towards Goal  Goal: Medications  Outcome: Progressing Towards Goal  Goal: Respiratory  Outcome: Progressing Towards Goal  Goal: Treatments/Interventions/Procedures  Outcome: Progressing Towards Goal  Goal: Psychosocial  Outcome: Progressing Towards Goal  Goal: *Optimal pain control at patient's stated goal  Outcome: Progressing Towards Goal  Goal: *Adequate urinary output (equal to or greater than 30 milliliters/hour)  Outcome: Progressing Towards Goal  Goal: *Hemodynamically stable  Outcome: Progressing Towards Goal  Goal: *Adequate nutrition  Outcome: Progressing Towards Goal  Goal: *Demonstrates progressive activity  Outcome: Progressing Towards Goal  Goal: *Participates in discharge planning  Outcome: Progressing Towards Goal     Problem: Small Bowel Obstruction: Day 4 to Discharge  Goal: Off Pathway (Use only if patient is Off Pathway)  Outcome: Progressing Towards Goal  Goal: Activity/Safety  Outcome: Progressing Towards Goal  Goal: Nutrition/Diet  Outcome: Progressing Towards Goal  Goal: Discharge Planning  Outcome: Progressing Towards Goal  Goal: Medications  Outcome: Progressing Towards Goal  Goal: Respiratory  Outcome: Progressing Towards Goal  Goal: Treatments/Interventions/Procedures  Outcome: Progressing Towards Goal  Goal: Psychosocial  Outcome: Progressing Towards Goal     Problem: Small Bowel Obstruction - Non Surgical: Discharge Outcomes  Goal: *Hemodynamically stable  Outcome: Progressing Towards Goal  Goal: *Demonstrates independent activity or return to baseline  Outcome: Progressing Towards Goal  Goal: *Optimal pain control at patient's stated goal  Outcome: Progressing Towards Goal  Goal: *Verbalizes understanding and describes prescribed diet  Outcome: Progressing Towards Goal  Goal: *Tolerating diet  Outcome: Progressing Towards Goal  Goal: *Verbalizes name, dosage, time, side effects, and number of days to continue medications  Outcome: Progressing Towards Goal  Goal: *Anxiety reduced or absent  Outcome: Progressing Towards Goal  Goal: *Understands and describes signs and symptoms to report to providers(Stroke Metric)  Outcome: Progressing Towards Goal  Goal: *Describes follow-up/return visits to physicians  Outcome: Progressing Towards Goal  Goal: *Describes available resources and support systems  Outcome: Progressing Towards Goal  Goal: *Active bowel function  Outcome: Progressing Towards Goal

## 2023-02-14 NOTE — PROGRESS NOTES
[facility] wife Ladi Reynaga due to cell phone being disconnected. ANTWAN met with pt, who stated wife can be reached on his cell phone:  [phone]. Pt was alert, more engaged than during prior visits, is hopeful to be discharged home today. ANTWAN updated Onc NP, Care Manager, and Hospice liaison re: above. Will continue to follow.

## 2023-02-14 NOTE — PROGRESS NOTES
Mechelle 44 accessed. Discussed discharge instructions and summary with patient. Patient verbalized understanding of instructions and medications. Patient aware that prescriptions were sent electronically to patient's usual pharmacy. Patient signed discharge paperwork via e-sign. Opportunity for questions/clarification provided. VSS. IV removed with no complication. Pt took all belongings with them.

## 2023-02-14 NOTE — DISCHARGE SUMMARY
Physician Discharge Summary     Patient ID:  Tung Hernandez  [id number]  41 y.o.  1975    Admit date: 2/10/2023    Discharge date and time: 2/14/2023    Admission Diagnoses: SBO (small bowel obstruction) [facility]) [K56.609]    Discharge Diagnoses/Hospital Course   Mr. Jessica Champion I a 41 to male with PMH of stage IV colon cancer with carcinomatosis admitted for SBO. He was evaluated by surgical oncology and heme/oncology as well as our palliative care and hospice teams. Pt clinically improved enough to tolerate PO and opted to discharge home. Hospice has met with the family but no decisions have been made about pursuit of hospice at this time     PCP: Yousuf Hutton MD     Consults: Hematology/Oncology and General Surgery    Discharge Exam:  Visit Vitals  /85 (BP 1 Location: Right upper arm, BP Patient Position: Lying; At rest)   Pulse 87   Temp 97.9 °F (36.6 °C)   Resp 16   SpO2 94%      Gen: cachetic.  Chronically ill appearing  HEENT:  Pink conjunctivae, PERRL, hearing intact to voice, moist mucous membranes  Neck:  Supple, without masses, thyroid non-tender  Resp:  No accessory muscle use, clear breath sounds without wheezes rales or rhonchi  Card:  No murmurs, normal S1, S2 without thrills, bruits or peripheral edema  Abd: ostomy with small amount of brown liquid   Musc:  No cyanosis or clubbing  Skin:  No rashes or ulcers, skin turgor is good  Neuro:  Cranial nerves 3-12 are grossly intact,  strength is 5/5 bilaterally and dorsi / plantarflexion is 5/5 bilaterally, follows commands appropriately  Psych:  flat affect     Disposition: home    Patient Instructions:   Current Discharge Medication List        START taking these medications    Details   sodium phosphate (Fleet Enema) 19-7 gram/118 mL enema Insert 2 Enemas into rectum PRN            CONTINUE these medications which have NOT CHANGED    Details   oxyCODONE ER (OxyCONTIN) 20 mg ER tablet Take 1 Tablet by mouth every eight (8) hours for 30 days. Max Daily Amount: 60 mg.  Qty: 90 Tablet, Refills: 0    Associated Diagnoses: Abdominal pain, generalized; Chronic left-sided low back pain without sciatica; Peritoneal carcinomatosis ([facility] [address] Colon adenocarcinoma (HCC)      oxyCODONE IR (ROXICODONE) 10 mg tab immediate release tablet Take 2 Tablets by mouth every four (4) hours as needed for Pain for up to 15 days. Max Daily Amount: 120 mg.  Qty: 180 Tablet, Refills: 0    Associated Diagnoses: Abdominal pain, generalized; Colon adenocarcinoma (HCC)      gabapentin (NEURONTIN) 300 mg capsule TAKE 1 CAPSULE BY MOUTH NIGHTLY. MAX DAILY AMOUNT: 300 MG. INDICATIONS: NEUROPATHIC PAIN  Qty: 30 Capsule, Refills: 0    Comments: Not to exceed 5 additional fills before 07/02/2023  Associated Diagnoses: Abdominal pain, generalized; Colon adenocarcinoma (HCC)      aspirin delayed-release 81 mg tablet Take 1 Tablet by mouth daily. Qty: 90 Tablet, Refills: 1    Associated Diagnoses: History of ST elevation myocardial infarction (STEMI)      clindamycin (CLEOCIN T) 1 % external solution Apply 1 mL to affected area two (2) times a day. use thin film on upper chest, back and face. Apply with cotton swab. Qty: 60 mL, Refills: 1    Associated Diagnoses: Acneiform rash      atorvastatin (LIPITOR) 20 mg tablet Take 1 Tablet by mouth daily. Qty: 90 Tablet, Refills: 4    Comments: NEW MEDICATION AS OF 1/11/2023      potassium chloride (KLOR-CON M10) 10 mEq tablet Take 1 Tablet by mouth daily. Qty: 90 Tablet, Refills: 1    Associated Diagnoses: Hypokalemia      ondansetron hcl (ZOFRAN) 8 mg tablet Take 1 Tablet by mouth every eight (8) hours as needed for Nausea or Vomiting. Qty: 30 Tablet, Refills: 2    Associated Diagnoses: Carcinoma of transverse colon (HCC)      prochlorperazine (Compazine) 10 mg tablet Take 1 Tablet by mouth every six (6) hours as needed for Nausea or Vomiting.   Qty: 30 Tablet, Refills: 2    Associated Diagnoses: Carcinoma of transverse colon ([address] multivitamin (ONE A DAY) tablet Take 1 Tablet by mouth daily. dexAMETHasone (DECADRON) 4 mg tablet Take 8mg (2 x tabs) on days 2 and 3 after treatment to prevent nausea. Take in the AM with food.   Qty: 30 Tablet, Refills: 3    Associated Diagnoses: Carcinoma of transverse colon ([address]           STOP taking these medications       loperamide (Imodium A-D) 2 mg tablet Comments:   Reason for Stopping:         lidocaine (LIDODERM) 5 % Comments:   Reason for Stopping:         lidocaine-prilocaine (EMLA) topical cream Comments:   Reason for Stopping:             Activity: Activity as tolerated  Diet: Clear liquids, advance as tolerated  Wound Care: None needed    Follow-up with Yousuf Hutton MD   Follow-up Information       Follow up With Specialties Details Why Miles Lechuga MD Hematology and Oncology, Internal Medicine Physician, Hematology Physician, Oncology Go on 2/17/2023 appt at 9:45 am [address]  [phone]      Yousuf Payment, [address]  [phone]               Approximate time spent in patient care on day of discharge: 35 minutes     Signed:  Ladi Walker MD  2/14/2023  3:33 PM

## 2023-02-14 NOTE — PROGRESS NOTES
Bedside shift change report given to Aletha (oncoming nurse) by Woody Walker (offgoing nurse). Report included the following information SBAR, Kardex, Intake/Output, MAR, and Recent Results.

## 2023-02-14 NOTE — DISCHARGE INSTRUCTIONS
HOSPITALIST DISCHARGE INSTRUCTIONS  NAME: Terence Sánchez Sr.   :  1975   MRN:  [id number]     Date/Time:  2023 1:17 PM    ADMIT DATE: 2/10/2023     DISCHARGE DATE: 2023     ADMITTING DIAGNOSIS:  Small bowel obstruction     DISCHARGE DIAGNOSIS:  As above     MEDICATIONS:  Please consider using an enema as needed      It is important that you take the medication exactly as they are prescribed. Keep your medication in the bottles provided by the pharmacist and keep a list of the medication names, dosages, and times to be taken in your wallet. Do not take other medications without consulting your doctor. Pain Management: per above medications    What to do at Home    Recommended diet:  Clear liquids, advance as tolerated    Recommended activity: Activity as tolerated    If you experience any of the following symptoms then please call your primary care physician or return to the emergency room if you cannot get hold of your doctor:  Fever, chills, nausea, vomiting, diarrhea, change in mentation, falling, bleeding, shortness of breath, chest pain       Information obtained by :  I understand that if any problems occur once I am at home I am to contact my physician. I understand and acknowledge receipt of the instructions indicated above.                                                                                                                                            Physician's or R.N.'s Signature                                                                  Date/Time                                                                                                                                              Patient or Representative Signature                                                          Date/Time

## 2023-02-14 NOTE — PROGRESS NOTES
2/14/2023    1:59 PM  CM notified pt is refusing hospice services at this time. [facility] had an info session with pt's wife via p/c, and wife will call  Hospice when pt is ready for hospice. Attending and nursing notified. DC order submitted. Pt to return home with family assistance as needed and oncology follow-up (see AVS). Care Management Interventions  PCP Verified by CM: Yes  Support Systems: Spouse/Significant Other  Confirm Follow Up Transport: Family  The Plan for Transition of Care is Related to the Following Treatment Goals : SBO  Discharge Location  Patient Expects to be Discharged to[de-identified] Home with family assistance    12:31 PM  Care Management Progress Note      ICD-10-CM ICD-9-CM    1. SBO (small bowel obstruction) ([facility] [address]  K56.609 560.9       2. Acute renal failure, unspecified acute renal failure type ([facility] [address]  N17.9 584.9       3. Palliative care encounter  Z51.5 V66.7       4. Cancer related pain  G89.3 338.3       5. Acute abdominal pain  R10.9 789.00      338.19       6. Cancer of ascending colon metastatic to intra-abdominal lymph node (HCC)  C18.2 153.6     C77.2 196.2       7. Goals of care, counseling/discussion  Z71.89 V65.49           RUR:  15%  Risk Level: []Low [x]Moderate []High  Value-based purchasing: [] Yes [x] No  Bundle patient: [] Yes [x] No   Specify:     Transition of care plan:  Awaiting medical clearance and DC order. Palliative following. Oncology following. TBD - Referral submitted via [facility] on 2/13/2023 for hospice info session with [employer]. Hospice liaison is attempting to schedule with pt. Outpatient follow-up. Transport need TBD.

## 2023-02-15 ENCOUNTER — HOSPITAL ENCOUNTER (OUTPATIENT)
Dept: INFUSION THERAPY | Age: 48
End: 2023-02-15

## 2023-02-15 DIAGNOSIS — C78.6 MALIGNANT NEOPLASM METASTATIC TO PERITONEUM (HCC): ICD-10-CM

## 2023-02-15 RX ORDER — HYDROCORTISONE SODIUM SUCCINATE 100 MG/2ML
100 INJECTION, POWDER, FOR SOLUTION INTRAMUSCULAR; INTRAVENOUS AS NEEDED
OUTPATIENT
Start: 2023-02-21

## 2023-02-15 RX ORDER — SODIUM CHLORIDE 9 MG/ML
5-250 INJECTION, SOLUTION INTRAVENOUS AS NEEDED
OUTPATIENT
Start: 2023-02-21

## 2023-02-15 RX ORDER — SODIUM CHLORIDE 9 MG/ML
5-40 INJECTION INTRAVENOUS AS NEEDED
OUTPATIENT
Start: 2023-02-21

## 2023-02-15 RX ORDER — DIPHENHYDRAMINE HYDROCHLORIDE 50 MG/ML
50 INJECTION, SOLUTION INTRAMUSCULAR; INTRAVENOUS AS NEEDED
Start: 2023-02-21

## 2023-02-15 RX ORDER — HEPARIN 100 UNIT/ML
500 SYRINGE INTRAVENOUS AS NEEDED
Start: 2023-02-21

## 2023-02-15 RX ORDER — SODIUM CHLORIDE 0.9 % (FLUSH) 0.9 %
5-40 SYRINGE (ML) INJECTION AS NEEDED
OUTPATIENT
Start: 2023-02-21

## 2023-02-15 RX ORDER — ALBUTEROL SULFATE 0.83 MG/ML
2.5 SOLUTION RESPIRATORY (INHALATION) AS NEEDED
Start: 2023-02-21

## 2023-02-15 RX ORDER — ONDANSETRON 2 MG/ML
8 INJECTION INTRAMUSCULAR; INTRAVENOUS AS NEEDED
OUTPATIENT
Start: 2023-02-21

## 2023-02-15 RX ORDER — EPINEPHRINE 1 MG/ML
0.3 INJECTION, SOLUTION, CONCENTRATE INTRAVENOUS AS NEEDED
OUTPATIENT
Start: 2023-02-21

## 2023-02-15 RX ORDER — ACETAMINOPHEN 325 MG/1
650 TABLET ORAL AS NEEDED
Start: 2023-02-21

## 2023-02-15 RX ORDER — DIPHENHYDRAMINE HYDROCHLORIDE 50 MG/ML
25 INJECTION, SOLUTION INTRAMUSCULAR; INTRAVENOUS AS NEEDED
Start: 2023-02-21

## 2023-02-15 NOTE — PROGRESS NOTES
[facility] [address], [address] [place] [address]  W: [phone]  F: [phone]      Reason for Visit:   Ashwin Gomes is a 41 y.o. male who is seen for evaluation of Stage IV colon cancer with carcinomatosis. Hematology/Oncology Treatment History:     Diagnosis #1: Follicular lymphoma  Stage: IV  Pathology:   11/13/18 right inguinal LN excision: Follicular lymphoma, high-grade (grade 3a of 3). Prior Treatment:   1. Obinutuzumab-CHOP. Obinutuzumab: 1000 mg weekly on days 1, 8, 15 for cycle 1, then 1000 mg on day 1 q21 days for cycles 2-6, then monotherapy 1000 mg every 21 days for cycle 7, 8 with Cyclophosphamide 750mg/m2, Doxorubicin 50mg/m2, Vincristine 1.4mg/m2 on day 1 and Prednisone 100mg on Days 1-5, every 21 days for a total of 2 cycles completed late 1/2019. Regimen discontinued due to STEMI. 2. Obinutuzumab + Bendamustine: 1000 mg Obinutuzumab on day 1 + Bendamustine 90mg/m2 on days 1-2 on a 28-day cycle x 4 cycles, completed 5/2019  Current Treatment: Surveillance           Diagnosis #2: Colon cancer:  Stage: IV  Pathology:    2/19/22 Ascending colon; biopsy: poorly differentiated carcinoma  Comment: No dysplasia is identified. Immunohistochemical stains performed on block 1A, show the tumor positive for Cam5.2 and shows patchy positivity for CDX2 with a Ki-67 index of -90%; the tumor is focally positive for CK5/6 and GATA3; negative for p63, Pax8, CK7, CK20, panmelanoma, synaptophysin and chromogranin. The immunophenotypic features are nonspecific but argues somewhat against the following carcinoma: urothelial, neuroendocrine and breast. The immunophenotypic features also argues against melanoma and somewhat against classic colorectal carcinoma.   Additional immunohistochemical stains to exclude the possibility of prostate and hepatocellular carcinoma have been   ordered; the results will be reported as an addendum. -MLH1/MSH2/MSH6/PMS2 all intact with no loss of nuclear expression of MMR proteins - no MSI   Addendum: The addendum is issued to report the results of additional immunohistochemical stains in an attempt to ascertain the primary site of the carcinoma. The diagnosis is unchanged. Hepar and glypican 3 immunohistochemical stains are neg, arguing against hepatocellular carcinoma. PSA stain is negative, arguing against prostatic primary. Imaging studies to eval for poss primary sites maybe useful. In the absence of carcinoma/tumor at any other sites, this may represent an undifferentiated carcinoma of the colon.  -Oncogenomics (molecular) studies: POLE< ARID1A, YVONNE, ATR, BRCA2, CHECK1, FANCI, NF1, PIK3CA, PTCH1, PTEN, RAD50, RAD51, RB1, SMARCA4, STK11  -Neogenomics: KRAS exon 2 and exon 3 not detected, a VUS p. E98K is detected in Exon 4 of KRAS, PD-L1 28-8 (Opdivo) for gastric/GEJ/EAC no expression, PD-L1 (22C3 pharmDx) TPS 1% (pembrolizumab), BRAF mutation not detected, NRAS exon 2 not detected, NRAS 3 not detected, NRAS 4 not detected. Prior Treatment:   1. Loop ileostomy 2/19/22  2. Diagnostic laparoscopy with peritoneal biopsy, 4/27/22  3. FOLFOXIRI every 2 weeks until disease progression or toxicity, 5/16/22 - 12/2022. Current Treatment: FOLFIRI + Panitumumab (6 mg/kg) every 14 days, to start 1/9/23     Oncologic History:  Was in his usual state of health in early November 2018 when he developed low back pain and presented to the ER. Work-up at outside hospital revealed a retroperitoneal mass seen on CT imaging, and he was transferred to [facility] for further work-up. CT there showed a large retroperitoneal mass encircling the aorta with invasion of the left renal hilum and left adrenal gland. There were bilateral inguinal lymph nodes and moderate left hydronephrosis. He was evaluated while at [facility] and was noted to have palpable nodes in his groin for approximately the past 1 month. He underwent excisional LN biopsy of right inguinal LN, which revealed follicular lymphoma. At time of diagnosis, he had no cardiac disease aside from HTN, hyperlipidemia. However, he did have an NSTEMI after cycle 2 of O-CHOP. Was likely unrelated to chemotherapy, but opted to switch treatment to Obinutuzumab-Bendamustine. He completed treatment in 5/2019 and had a CR based on PET. History of Present Illness:   Shannon Martin Sr. is a pleasant 41 y.o. male with metastatic colon cancer who is here for  of [facility]. He was recently admitted for small bowel obstruction. He had an NG tube placed, that was eventually removed as he was tolerating liquids and had stool output in colostomy bag. Hospice was discussed, but patient wants to try immunotherapy. Reports stomach is bloated and painful. He had chicken noodle soup and 1/2 burger. Hydrating with water. No stool output since eating these. Reports mild nausea, no vomiting. Patient and family ask about life expectancy and how hospice works, what to expect at end of life. Present with daughter Karol Enriquez,) son Brianne Celaya,) and wife Ricardo Zuniga)    Current Outpatient Medications   Medication Sig    oxyCODONE ER (OxyCONTIN) 20 mg ER tablet Take 1 Tablet by mouth every eight (8) hours for 30 days. Max Daily Amount: 60 mg.    oxyCODONE IR (ROXICODONE) 10 mg tab immediate release tablet Take 2 Tablets by mouth every four (4) hours as needed for Pain for up to 15 days. Max Daily Amount: 120 mg.    gabapentin (NEURONTIN) 300 mg capsule TAKE 1 CAPSULE BY MOUTH NIGHTLY. MAX DAILY AMOUNT: 300 MG. INDICATIONS: NEUROPATHIC PAIN    aspirin delayed-release 81 mg tablet Take 1 Tablet by mouth daily. clindamycin (CLEOCIN T) 1 % external solution Apply 1 mL to affected area two (2) times a day. use thin film on upper chest, back and face. Apply with cotton swab. atorvastatin (LIPITOR) 20 mg tablet Take 1 Tablet by mouth daily.     potassium chloride (KLOR-CON M10) 10 mEq tablet Take 1 Tablet by mouth daily. ondansetron hcl (ZOFRAN) 8 mg tablet Take 1 Tablet by mouth every eight (8) hours as needed for Nausea or Vomiting. prochlorperazine (Compazine) 10 mg tablet Take 1 Tablet by mouth every six (6) hours as needed for Nausea or Vomiting. dexAMETHasone (DECADRON) 4 mg tablet Take 8mg (2 x tabs) on days 2 and 3 after treatment to prevent nausea. Take in the AM with food. multivitamin (ONE A DAY) tablet Take 1 Tablet by mouth daily. No current facility-administered medications for this visit. No Known Allergies       Review of Systems: A complete review of systems was obtained, reviewed. Pertinent findings reviewed above. Physical Exam:   Visit Vitals  /85 (BP 1 Location: Left upper arm, BP Patient Position: Sitting, BP Cuff Size: Adult)   Pulse (!) 124   Temp 98.5 °F (36.9 °C) (Oral)   Resp 20   Ht 5' 7\" (1.702 m)   Wt 130 lb 3.2 oz (59.1 kg)   SpO2 91%   BMI 20.39 kg/m²       ECOG PS: 2  General: frail, cachectic; sitting in wheelchair. Eyes:  anicteric sclera  Neck: supple  Respiratory normal respiratory effort  CV: port noted to upper chest area; no peripheral edema  GI: soft, nontender, nondistended. Ostomy noted with liquid brown drainage. MS: digits without clubbing or cyanosis. Skin: no rashes, no ecchymoses, no petechia. normal temperature, turgor, and texture. Psych: alert, oriented, appropriate affect, normal judgment/insight    Results:     Lab Results   Component Value Date/Time    WBC 7.1 02/09/2023 06:16 PM    HGB 12.2 02/09/2023 06:16 PM    HCT 36.1 (L) 02/09/2023 06:16 PM    PLATELET 617 88/28/2403 06:16 PM    MCV 92.8 02/09/2023 06:16 PM    ABS.  NEUTROPHILS 5.5 02/09/2023 06:16 PM    Hemoglobin (POC) 15.0 06/05/2009 02:13 PM    Hematocrit (POC) 39 02/14/2019 01:24 PM     Lab Results   Component Value Date/Time    Sodium 133 (L) 02/13/2023 11:38 AM    Potassium 4.3 02/13/2023 11:38 AM    Chloride 99 02/13/2023 11:38 AM    CO2 30 02/13/2023 11:38 AM    Glucose 101 (H) 02/13/2023 11:38 AM    BUN 12 02/13/2023 11:38 AM    Creatinine 0.57 (L) 02/13/2023 11:38 AM    GFR est AA >60 10/03/2022 07:50 AM    GFR est non-AA >60 10/03/2022 07:50 AM    Calcium 8.5 02/13/2023 11:38 AM    Sodium (POC) 136 02/14/2019 01:24 PM    Potassium (POC) 3.9 02/14/2019 01:24 PM    Chloride (POC) 102 02/14/2019 01:24 PM    Glucose (POC) 249 (H) 02/15/2019 10:21 PM    BUN (POC) 14 02/14/2019 01:24 PM    Creatinine (POC) 0.9 02/14/2019 01:24 PM    Calcium, ionized (POC) 1.24 02/14/2019 01:24 PM     Lab Results   Component Value Date/Time    Bilirubin, total 0.7 02/06/2023 07:52 AM    ALT (SGPT) 13 02/06/2023 07:52 AM    Alk. phosphatase 296 (H) 02/06/2023 07:52 AM    Protein, total 6.8 02/06/2023 07:52 AM    Albumin 2.8 (L) 02/06/2023 07:52 AM    Globulin 4.0 02/06/2023 07:52 AM     Lab Results   Component Value Date/Time    Iron % saturation 10 (L) 05/06/2022 11:13 AM    TIBC 173 (L) 05/06/2022 11:13 AM    Ferritin 426 (H) 05/06/2022 11:13 AM     02/15/2022 02:33 PM    Beta-2 Microglobulin, serum 2.5 (H) 12/28/2018 10:00 AM    TSH 1.53 09/28/2016 04:40 AM    Lipase 49 (L) 02/09/2023 06:16 PM    Hep C virus Ab Interp. NONREACTIVE 12/27/2018 04:53 PM    HIV 1/2 Interpretation NONREACTIVE 12/28/2018 10:00 AM       Lab Results   Component Value Date/Time    INR 1.1 02/22/2019 08:18 PM    aPTT 27.8 02/22/2019 08:18 PM      Lab Results   Component Value Date/Time    CEA 43.2 01/09/2023 08:09 AM     02/15/2022 02:33 PM    HCG, beta, QT <1 11/10/2018 03:41 AM    AFP, Serum, Tumor Marker 4.1 11/10/2018 03:41 AM        2/24/22 CT abd/pelvis at [facility]:  FINDINGS: An enteric tube with sidehole beyond the level of the lower esophageal sphincter is seen looping on itself in the proximal stomach before terminating in the mid stomach. Status post right lower quadrant diverting ileostomy for an obstructing colonic mass.  Multiple loops of gas dilated small bowel remain, with a maximal diameter of 5.4 cm whereas previously measured 3.6 cm. Dilute contrast is seen within the lateral left abdominal dilated small bowel. Moderate stool burden and bowel gas opacifies the cecum, measuring 7.3 cm in diameter. No definite supine evidence of pneumoperitoneum. Lung bases: Limited evaluation of the lung bases demonstrates a cardiac silhouette within normal limits for size. A small bore central venous catheter is seen terminating in the right atrium. No focal consolidation or pleural effusion. 2/24/22 CT abd/pelvis [facility]:  IMPRESSION:  1. Status post right lower quadrant loop ileostomy. Mild diffuse dilation of small bowel proximal to the ostomy in the lower abdomen and upper pelvis concerning for at least mild to moderate partial bowel obstruction. Relatively decompressed loop ileostomy. 2. Mildly complex pelvic fluid collection in the rectovesical space, decreased in size from prior. 3. Redemonstrated ascending colon mass and findings suspicious for regional metastatic disease. 4. Redemonstrated soft tissue in the retroperitoneum with prominent lymph nodes, similar to prior examination. Differential would include metastasis, retroperitoneal fibrosis. 5. Mild atherosclerosis. 6. Subcentimeter right renal hypodensity, too small to characterize. 7. Severe compression of the left renal vein, similar to prior examination. 8. Additional findings as described above. Bones: No acute osseous abnormality. 2/24/22 CT chest [facility]:  IMPRESSION:  FINAL report. 1. No evidence of metastatic disease to the chest.  2. No enlarged lymph nodes. 3. Increasing volume loss in the lung bases which may be due to atelectasis. 4. CT abdomen and pelvis reported separately. 2/25/22 Colonoscopy at [facility]:  Impression:            - Preparation of the colon was inadequate. - Stool in the entire examined colon.                         - Likely malignant completely obstructing tumor at the hepatic flexure. Biopsied.                        - Malignant-appearing tumor in the colon. Complications:         No immediate complications. 7/19/22 CT ch/abd/pelv:  IMPRESSION  Response to treatment characterized by decrease in size of the apical core  lesion in the proximal transverse colon and decreased mucosal colic  lymphadenopathy. Persistent mesenteric lymphadenopathy and retroperitoneal/mesenteric soft tissue  which may relate to the patient's history of lymphoma. Chronic left renal atrophy with chronic left hydronephrosis. RECIST:  Target lesions:  Transverse colon mass, series 2, image 75, 27 x 26 mm, previously 49 x 45 mm. Nontarget lesions:  Transverse mesocolic lymph nodes, decreased. 10/10/22 CT ch/abd/pelv:  IMPRESSION  Increase in peritoneal disease compared to the prior examination. Stable  mesenteric infiltrate. Improvement in the mass lesion involving the transverse  colon. RECIST:  Target lesion:  Transverse colon mass, series 2, image 76, 1.7 x 1.3 cm, previously 2.7 x 2.6cm. Nontarget lesions:   Transverse mesial colic lymph nodes unchanged. 12/30/22 CT ch/abd/pelvis:  IMPRESSION  Progression of disease with increase in size of perihepatic  infiltrative disease, increased in size and number of abdominal metastases, and  new soft tissue metastases in the anterior abdominal wall. Soft tissue  infiltrating around the celiac axis, SMA, and renal arteries and veins is not  significant changed. Incidentals as above with no CT evidence of metastatic  disease to the thorax. 2/9/2023 CT A/P WO CONT  IMPRESSION  Small bowel obstruction with worsening of carcinomatosis. 2/10/23 XR ABD   IMPRESSION: Nasogastric tube appears to be in satisfactory position. 2/12/23 KUB: IMPRESSION   Unchanged small bowel obstruction. No visible enteric tube. Test results above have been reviewed.     Assessment/Recommendations:       Undifferentiated carcinoma of transverse colon, metastatic:   SUZANNE, PDL1 1%, BRAF/NRAS negative, possibly KRAS wild-type  S/p diverting ileostomy due to large bowel obstruction. Colonoscopy with biopsy on 2/25/22. No obvious metastatic disease on CT imaging, but did have obvious metastatic disease to peritoneum during laparoscopy with Dr. Juanjose Villeda. Initially treated with  FOLFOXIRI every 2 weeks. CT after C9 showed good response with decrease in size of colon mass, but increased peritoneal implants. He received 2 cycles of second line therapy FOLFIRI + Panitumumab every 2 weeks. C3 FOLFIRI + Panitumumab delayed due to neutropenia. Unfortunately, he developed a small bowel obstruction and worsening carcinomatosis on recent imaging. Have reviewed with patient t                                              hat SBO in the setting metastatic cancer, especially carcinomatosis, is quite difficult to treat. It is likely to be recurrent and is unlikely to respond to any treatments. Pt is improving symptom wise but not radio graphically; pt is  tolerating clear liquids and colostomy having stool. Reviewed additional tx options this am; oral regimen vs immunotherapy vs support of hospice. Oral tx not ideal in setting of SBO and takes time to work; immunotherapy also slow to work. Pt would like to discuss with family but shares it will more than likely not be until he is home. -- Discussed that further therapy is unlikely to improve his disease, but that switching to immunotherapy is an option if he wants to keep trying treatment. -- Recommend hospice info session to help patient and family understand end of life care. -- Holding Constant Therapy ([place]) today pending authorization. -- Consent signed today   -- Additional IVF today and scheduled on Tuesdays in the off weeks. -- 2/21 return for C1 of Keytruda  -- 3/14/23 return for Lucy Hatch MD visit    2. Small bowel obstruction:   Due to worsening carcinomatosis.  Required NGT in hospital, but spontaneously resolved with bowel rest and slow uptitration of liquids. Advised that he should avoid solid food to decrease risk of recurrence. 3. Chronic lumbar back pain / anxiety / RLQ / acute right upper quadrant and umbilical pain:   Left lower back pain is chronic/stable. RLQ is likely related to neoplasm/colostomy/parastomal hernia. Evaluated by Dr. Fadumo Delgado who believes umbilical burning pain is due to tumor implants. -- Follows  with Dr. Adonis Lockhart palliative outpatient   -- Following with Dr. Ryann Cuadra and receiving 3-4 weeks of daily palliative radiation to tumor implants. 4. H/o Follicular lymphoma:   Completed treatment 5/2019. End of treatment PET 6/3/19 showed CR. No extra surveillance needed at this time given metastatic colon cancer with frequent imaging. Current imaging shows slight increase in soft tissue density in deep pelvis on 10/2022. Will continue to monitor for colon cancer follow up imaging. 5. CAD / HTN:   h/o STEMI 2/14/19 with TOM placed to proximal LAD. Follows with Dr. Nga Reddy. On ASA, Lipitor. 6. Goals of care:   Disease is incurable and will be difficult to treat if pt has recurrent SBO. Patient understandably struggling with diagnosis and prognosis (4-6 weeks). Offered pt and family support. Advised hospice info session at home. Following with Dr. Brent Damon. I personally saw and evaluated the patient and performed the key components of medical decision making. The history, physical exam, and documentation were performed by Pascual Cassidy NP. I reviewed and verified the above documentation and modified it as needed. I discussed goals of care, end of life support, management of SBO, answered patient and family's questions. Pt struggling with decision on hospice understandably. Will plan on Keytruda to start Tuesday, 2/21/23.     Signed By: Moises Menon NP

## 2023-02-15 NOTE — ONCOLOGY PATHWAY NOTE
DISCONTINUE ON PATHWAY REGIMEN - Colorectal    [id number]        Bevacizumab-xxxx       Irinotecan (Camptosar)       Oxaliplatin (Eloxatin)       Leucovorin       Fluorouracil     **Always confirm dose/schedule in your pharmacy ordering system**    REASON: Other Reason  PRIOR TREATMENT: [id number]  TREATMENT RESPONSE: Progressive Disease (PD)    START OFF PATHWAY REGIMEN - Colorectal        [id number]:      Pembrolizumab (Keytruda)     **Always confirm dose/schedule in your pharmacy ordering system**    Patient Characteristics:  Distant Metastases, Nonsurgical Candidate, KRAS/NRAS Wild-Type (BRAF V600 Wild-Type/Unknown), Targeted/Immunotherapy (MSI-H/dMMR or HER2-Amplified), SUZANNE/pMMR and HER2 Negative/Unknown  Tumor Location: Colon  Therapeutic Status: Distant Metastases  Microsatellite/Mismatch Repair Status: SUZANNE/pMMR  BRAF Mutation Status: Wild-Type (no mutation)  KRAS/NRAS Mutation Status: Wild-Type (no mutation)  Preferred Therapy Approach:  Targeted/Immunotherapy (MSI-H/dMMR or  HER2-Amplified)  HER2 Status: Negative  Intent of Therapy:  Non-Curative / Palliative Intent, Discussed with Patient

## 2023-02-16 ENCOUNTER — APPOINTMENT (OUTPATIENT)
Dept: INFUSION THERAPY | Age: 48
End: 2023-02-16

## 2023-02-16 ENCOUNTER — TELEPHONE (OUTPATIENT)
Dept: PALLATIVE CARE | Age: 48
End: 2023-02-16

## 2023-02-16 DIAGNOSIS — R10.84 ABDOMINAL PAIN, GENERALIZED: ICD-10-CM

## 2023-02-16 DIAGNOSIS — C18.9 COLON ADENOCARCINOMA (HCC): ICD-10-CM

## 2023-02-16 RX ORDER — OXYCODONE HYDROCHLORIDE 10 MG/1
20 TABLET ORAL
Qty: 180 TABLET | Refills: 0 | Status: SHIPPED | OUTPATIENT
Start: 2023-02-16 | End: 2023-03-03

## 2023-02-16 RX ORDER — HEPARIN 100 UNIT/ML
500 SYRINGE INTRAVENOUS AS NEEDED
Status: CANCELLED | OUTPATIENT
Start: 2023-02-17

## 2023-02-16 RX ORDER — SODIUM CHLORIDE 0.9 % (FLUSH) 0.9 %
5-40 SYRINGE (ML) INJECTION AS NEEDED
Status: CANCELLED | OUTPATIENT
Start: 2023-02-17

## 2023-02-16 RX ORDER — SODIUM CHLORIDE 9 MG/ML
5-250 INJECTION, SOLUTION INTRAVENOUS AS NEEDED
Status: CANCELLED | OUTPATIENT
Start: 2023-02-17

## 2023-02-16 RX ORDER — SODIUM CHLORIDE 9 MG/ML
5-40 INJECTION INTRAVENOUS AS NEEDED
Status: CANCELLED | OUTPATIENT
Start: 2023-02-17

## 2023-02-16 NOTE — TELEPHONE ENCOUNTER
Received call from patient requesting Oxycodone 10 mg refill. Patient has 20 pills remaining. To be called in to [facility] pharmacy.

## 2023-02-16 NOTE — TELEPHONE ENCOUNTER
Triage for Controlled Substance Refill Request     Pain Diagnosis: _Colon adenocarcinoma ([facility] [address] (C18.9); Abdominal pain, generalized (R10.84); Peritoneal carcinomatosis ([facility] [address] (C78.6)     Last Outpatient Visit: _2/6/2023     Next Outpatient Visit: Ayo Will yesterdays appt due to hospitalization     Reason for refill needed outside of office visit? Missed scheduled appt prior to need for scheduled refill        Pharmacy: _[facility]/pharmacy #[id number]      Medication:oxyCODONE IR (ROXICODONE) 10 mg tab immediate release tablet     Dose and directions: Take 1 Tablet by mouth every four (4) hours as needed for Pain for up to 15 days.   Number dispensed:90  Date filled ( or Pharmacy) 1/31/2023  # left: 20         reviewed: _yes 2/16/2023     Date of Urine Drug Screen:  _8/22/2022     Opioid Safety Handout given:  _yes     Appropriate for refill:  _yes     Action:  _ Medication pending

## 2023-02-17 ENCOUNTER — OFFICE VISIT (OUTPATIENT)
Dept: ONCOLOGY | Age: 48
End: 2023-02-17

## 2023-02-17 ENCOUNTER — HOSPITAL ENCOUNTER (OUTPATIENT)
Dept: INFUSION THERAPY | Age: 48
Discharge: HOME | End: 2023-02-17
Payer: MEDICAID

## 2023-02-17 VITALS
DIASTOLIC BLOOD PRESSURE: 85 MMHG | RESPIRATION RATE: 20 BRPM | WEIGHT: 130.2 LBS | HEIGHT: 67 IN | TEMPERATURE: 98.5 F | SYSTOLIC BLOOD PRESSURE: 124 MMHG | BODY MASS INDEX: 20.44 KG/M2 | HEART RATE: 124 BPM | OXYGEN SATURATION: 91 %

## 2023-02-17 DIAGNOSIS — C18.9 COLON ADENOCARCINOMA (HCC): Primary | ICD-10-CM

## 2023-02-17 DIAGNOSIS — Z71.89 GOALS OF CARE, COUNSELING/DISCUSSION: ICD-10-CM

## 2023-02-17 DIAGNOSIS — R53.0 NEOPLASTIC MALIGNANT RELATED FATIGUE: ICD-10-CM

## 2023-02-17 DIAGNOSIS — C82.90 FOLLICULAR LYMPHOMA, UNSPECIFIED FOLLICULAR LYMPHOMA TYPE, UNSPECIFIED BODY REGION (HCC): ICD-10-CM

## 2023-02-17 DIAGNOSIS — C78.6 PERITONEAL CARCINOMATOSIS (HCC): ICD-10-CM

## 2023-02-17 DIAGNOSIS — G89.3 NEOPLASM RELATED PAIN: ICD-10-CM

## 2023-02-17 DIAGNOSIS — C18.4 CARCINOMA OF TRANSVERSE COLON (HCC): Primary | ICD-10-CM

## 2023-02-17 LAB
ALBUMIN SERPL-MCNC: 2.2 G/DL (ref 3.5–5)
ALBUMIN/GLOB SERPL: 0.5 (ref 1.1–2.2)
ALP SERPL-CCNC: 486 U/L (ref 45–117)
ALT SERPL-CCNC: 28 U/L (ref 12–78)
ANION GAP SERPL CALC-SCNC: 7 MMOL/L (ref 5–15)
AST SERPL-CCNC: 40 U/L (ref 15–37)
BASOPHILS # BLD: 0.1 K/UL (ref 0–0.1)
BASOPHILS NFR BLD: 0 % (ref 0–1)
BILIRUB SERPL-MCNC: 1 MG/DL (ref 0.2–1)
BUN SERPL-MCNC: 10 MG/DL (ref 6–20)
BUN/CREAT SERPL: 11 (ref 12–20)
CALCIUM SERPL-MCNC: 8.6 MG/DL (ref 8.5–10.1)
CHLORIDE SERPL-SCNC: 96 MMOL/L (ref 97–108)
CO2 SERPL-SCNC: 28 MMOL/L (ref 21–32)
CORTIS SERPL-MCNC: 28.5 UG/DL
CREAT SERPL-MCNC: 0.91 MG/DL (ref 0.7–1.3)
DIFFERENTIAL METHOD BLD: ABNORMAL
EOSINOPHIL # BLD: 0 K/UL (ref 0–0.4)
EOSINOPHIL NFR BLD: 0 % (ref 0–7)
ERYTHROCYTE [DISTWIDTH] IN BLOOD BY AUTOMATED COUNT: 15.5 % (ref 11.5–14.5)
GLOBULIN SER CALC-MCNC: 4.2 G/DL (ref 2–4)
GLUCOSE SERPL-MCNC: 105 MG/DL (ref 65–100)
HCT VFR BLD AUTO: 32.1 % (ref 36.6–50.3)
HGB BLD-MCNC: 10.2 G/DL (ref 12.1–17)
IMM GRANULOCYTES # BLD AUTO: 0.1 K/UL (ref 0–0.04)
IMM GRANULOCYTES NFR BLD AUTO: 1 % (ref 0–0.5)
LYMPHOCYTES # BLD: 0.9 K/UL (ref 0.8–3.5)
LYMPHOCYTES NFR BLD: 6 % (ref 12–49)
MCH RBC QN AUTO: 30.1 PG (ref 26–34)
MCHC RBC AUTO-ENTMCNC: 31.8 G/DL (ref 30–36.5)
MCV RBC AUTO: 94.7 FL (ref 80–99)
MONOCYTES # BLD: 1.6 K/UL (ref 0–1)
MONOCYTES NFR BLD: 10 % (ref 5–13)
NEUTS SEG # BLD: 13.7 K/UL (ref 1.8–8)
NEUTS SEG NFR BLD: 83 % (ref 32–75)
NRBC # BLD: 0 K/UL (ref 0–0.01)
NRBC BLD-RTO: 0 PER 100 WBC
PLATELET # BLD AUTO: 201 K/UL (ref 150–400)
PMV BLD AUTO: 10.4 FL (ref 8.9–12.9)
POTASSIUM SERPL-SCNC: 4.1 MMOL/L (ref 3.5–5.1)
PROT SERPL-MCNC: 6.4 G/DL (ref 6.4–8.2)
RBC # BLD AUTO: 3.39 M/UL (ref 4.1–5.7)
SODIUM SERPL-SCNC: 131 MMOL/L (ref 136–145)
TSH SERPL DL<=0.05 MIU/L-ACNC: 0.6 UIU/ML (ref 0.36–3.74)
WBC # BLD AUTO: 16.5 K/UL (ref 4.1–11.1)

## 2023-02-17 PROCEDURE — 36415 COLL VENOUS BLD VENIPUNCTURE: CPT

## 2023-02-17 PROCEDURE — 84443 ASSAY THYROID STIM HORMONE: CPT

## 2023-02-17 PROCEDURE — 74011250636 HC RX REV CODE- 250/636: Performed by: NURSE PRACTITIONER

## 2023-02-17 PROCEDURE — 74011000250 HC RX REV CODE- 250: Performed by: NURSE PRACTITIONER

## 2023-02-17 PROCEDURE — 96360 HYDRATION IV INFUSION INIT: CPT

## 2023-02-17 PROCEDURE — 85025 COMPLETE CBC W/AUTO DIFF WBC: CPT

## 2023-02-17 PROCEDURE — 77030016057 HC NDL HUBR APOL -B

## 2023-02-17 PROCEDURE — 82533 TOTAL CORTISOL: CPT

## 2023-02-17 PROCEDURE — 80053 COMPREHEN METABOLIC PANEL: CPT

## 2023-02-17 RX ORDER — SODIUM CHLORIDE 0.9 % (FLUSH) 0.9 %
5-40 SYRINGE (ML) INJECTION AS NEEDED
Status: DISPENSED | OUTPATIENT
Start: 2023-02-17 | End: 2023-02-17

## 2023-02-17 RX ORDER — HEPARIN 100 UNIT/ML
500 SYRINGE INTRAVENOUS AS NEEDED
Status: ACTIVE | OUTPATIENT
Start: 2023-02-17 | End: 2023-02-17

## 2023-02-17 RX ORDER — SODIUM CHLORIDE 9 MG/ML
5-250 INJECTION, SOLUTION INTRAVENOUS AS NEEDED
Status: DISPENSED | OUTPATIENT
Start: 2023-02-17 | End: 2023-02-17

## 2023-02-17 RX ORDER — SODIUM CHLORIDE 9 MG/ML
5-40 INJECTION INTRAVENOUS AS NEEDED
Status: ACTIVE | OUTPATIENT
Start: 2023-02-17 | End: 2023-02-17

## 2023-02-17 RX ADMIN — SODIUM CHLORIDE 1000 ML: 9 INJECTION, SOLUTION INTRAVENOUS at 11:30

## 2023-02-17 RX ADMIN — SODIUM CHLORIDE, PRESERVATIVE FREE 10 ML: 5 INJECTION INTRAVENOUS at 12:39

## 2023-02-17 RX ADMIN — Medication 500 UNITS: at 12:38

## 2023-02-17 NOTE — PROGRESS NOTES
1. Have you been to the ER, urgent care clinic since your last visit? Hospitalized since your last visit? Yes [facility]     2. Have you seen or consulted any other health care providers outside of the [address] [place] [address] since your last visit? Include any pap smears or colon screening.  No        Visit Vitals  /85 (BP 1 Location: Left upper arm, BP Patient Position: Sitting, BP Cuff Size: Adult)   Pulse (!) 124   Temp 98.5 °F (36.9 °C) (Oral)   Resp 20   Ht 5' 7\" (1.702 m)   Wt 130 lb 3.2 oz (59.1 kg)   SpO2 91%   BMI 20.39 kg/m²            Chief Complaint   Patient presents with    Follow-up    Colon Cancer

## 2023-02-17 NOTE — PROGRESS NOTES
[facility] Progress Note    Date: 2023    Name: Ren Hanna. MRN: [id number]         : 1975    Mr. Dipti Sanchez Arrived in a wheelchair and in no distress for Hydration. Assessment was completed, no acute issues at this time, no new complaints voiced. Right chest wall port accessed without difficulty, labs drawn & sent for processing. Lab results were obtained and reviewed. Recent Results (from the past 12 hour(s))   CBC WITH AUTOMATED DIFF    Collection Time: 23 11:25 AM   Result Value Ref Range    WBC 16.5 (H) 4.1 - 11.1 K/uL    RBC 3.39 (L) 4.10 - 5.70 M/uL    HGB 10.2 (L) 12.1 - 17.0 g/dL    HCT 32.1 (L) 36.6 - 50.3 %    MCV 94.7 80.0 - 99.0 FL    MCH 30.1 26.0 - 34.0 PG    MCHC 31.8 30.0 - 36.5 g/dL    RDW 15.5 (H) 11.5 - 14.5 %    PLATELET 046 082 - 315 K/uL    MPV 10.4 8.9 - 12.9 FL    NRBC 0.0 0  WBC    ABSOLUTE NRBC 0.00 0.00 - 0.01 K/uL    NEUTROPHILS 83 (H) 32 - 75 %    LYMPHOCYTES 6 (L) 12 - 49 %    MONOCYTES 10 5 - 13 %    EOSINOPHILS 0 0 - 7 %    BASOPHILS 0 0 - 1 %    IMMATURE GRANULOCYTES 1 (H) 0.0 - 0.5 %    ABS. NEUTROPHILS 13.7 (H) 1.8 - 8.0 K/UL    ABS. LYMPHOCYTES 0.9 0.8 - 3.5 K/UL    ABS. MONOCYTES 1.6 (H) 0.0 - 1.0 K/UL    ABS. EOSINOPHILS 0.0 0.0 - 0.4 K/UL    ABS. BASOPHILS 0.1 0.0 - 0.1 K/UL    ABS. IMM. GRANS. 0.1 (H) 0.00 - 0.04 K/UL    DF AUTOMATED     METABOLIC PANEL, COMPREHENSIVE    Collection Time: 23 11:25 AM   Result Value Ref Range    Sodium 131 (L) 136 - 145 mmol/L    Potassium 4.1 3.5 - 5.1 mmol/L    Chloride 96 (L) 97 - 108 mmol/L    CO2 28 21 - 32 mmol/L    Anion gap 7 5 - 15 mmol/L    Glucose 105 (H) 65 - 100 mg/dL    BUN 10 6 - 20 MG/DL    Creatinine 0.91 0.70 - 1.30 MG/DL    BUN/Creatinine ratio 11 (L) 12 - 20      eGFR >60 >60 ml/min/1.73m2    Calcium 8.6 8.5 - 10.1 MG/DL    Bilirubin, total 1.0 0.2 - 1.0 MG/DL    ALT (SGPT) 28 12 - 78 U/L    AST (SGOT) 40 (H) 15 - 37 U/L    Alk.  phosphatase 486 (H) 45 - 117 U/L    Protein, total 6.4 6.4 - 8.2 g/dL    Albumin 2.2 (L) 3.5 - 5.0 g/dL    Globulin 4.2 (H) 2.0 - 4.0 g/dL    A-G Ratio 0.5 (L) 1.1 - 2.2     TSH 3RD GENERATION    Collection Time: 02/17/23 11:25 AM   Result Value Ref Range    TSH 0.60 0.36 - 3.74 uIU/mL       Medications:  Medications Administered       heparin (porcine) pf 500 Units       Admin Date  02/17/2023 Action  Given Dose  500 Units Route  InterCATHeter Administered By  [place], [place]              sodium chloride (NS) flush 5-40 mL       Admin Date  02/17/2023 Action  Given Dose  10 mL Route  IntraVENous Administered By  [place], [place]              sodium chloride 0.9 % bolus infusion 1,000 mL       Admin Date  02/17/2023 Action  New Bag Dose  1,000 mL Rate  1,000 mL/hr Route  IntraVENous Administered By  [place], [place]                   Mr. Tom Brandon tolerated treatment well and was discharged from [place] [address] in stable condition at [phone]. Port de-accessed, flushed & heparinized per protocol. He is to return on February 21 at 1030 for his next appointment.     [facility]  February 17, 2023

## 2023-02-20 ENCOUNTER — APPOINTMENT (OUTPATIENT)
Dept: INFUSION THERAPY | Age: 48
End: 2023-02-20

## 2023-02-21 ENCOUNTER — HOME CARE VISIT (OUTPATIENT)
Dept: HOSPICE | Facility: HOSPICE | Age: 48
End: 2023-02-21

## 2023-02-21 ENCOUNTER — TELEPHONE (OUTPATIENT)
Dept: ONCOLOGY | Age: 48
End: 2023-02-21

## 2023-02-21 ENCOUNTER — TELEPHONE (OUTPATIENT)
Dept: PALLATIVE CARE | Age: 48
End: 2023-02-21

## 2023-02-21 DIAGNOSIS — M54.50 CHRONIC LEFT-SIDED LOW BACK PAIN WITHOUT SCIATICA: ICD-10-CM

## 2023-02-21 DIAGNOSIS — C78.6 PERITONEAL CARCINOMATOSIS (HCC): ICD-10-CM

## 2023-02-21 DIAGNOSIS — C18.9 COLON ADENOCARCINOMA (HCC): ICD-10-CM

## 2023-02-21 DIAGNOSIS — R10.84 ABDOMINAL PAIN, GENERALIZED: ICD-10-CM

## 2023-02-21 DIAGNOSIS — G89.29 CHRONIC LEFT-SIDED LOW BACK PAIN WITHOUT SCIATICA: ICD-10-CM

## 2023-02-21 RX ORDER — SODIUM CHLORIDE 9 MG/ML
1000 INJECTION, SOLUTION INTRAVENOUS ONCE
Start: 2023-02-21 | End: 2023-02-21

## 2023-02-21 RX ORDER — HEPARIN 100 UNIT/ML
500 SYRINGE INTRAVENOUS AS NEEDED
Status: CANCELLED | OUTPATIENT
Start: 2023-02-23

## 2023-02-21 RX ORDER — OXYCODONE HYDROCHLORIDE 10 MG/1
20 TABLET ORAL
Qty: 180 TABLET | Refills: 0 | Status: CANCELLED | OUTPATIENT
Start: 2023-03-03 | End: 2023-03-18

## 2023-02-21 RX ORDER — OXYCODONE HCL 20 MG/1
20 TABLET, FILM COATED, EXTENDED RELEASE ORAL EVERY 8 HOURS
Qty: 90 TABLET | Refills: 0 | Status: CANCELLED | OUTPATIENT
Start: 2023-03-08 | End: 2023-04-07

## 2023-02-21 RX ORDER — SODIUM CHLORIDE 9 MG/ML
5-250 INJECTION, SOLUTION INTRAVENOUS AS NEEDED
Status: CANCELLED | OUTPATIENT
Start: 2023-02-23

## 2023-02-21 RX ORDER — SODIUM CHLORIDE 9 MG/ML
5-40 INJECTION INTRAVENOUS AS NEEDED
Status: CANCELLED | OUTPATIENT
Start: 2023-02-23

## 2023-02-21 RX ORDER — SODIUM CHLORIDE 0.9 % (FLUSH) 0.9 %
5-40 SYRINGE (ML) INJECTION AS NEEDED
Status: CANCELLED | OUTPATIENT
Start: 2023-02-23

## 2023-02-21 RX ORDER — OXYCODONE HYDROCHLORIDE 10 MG/1
20 TABLET ORAL
Qty: 180 TABLET | Refills: 0 | Status: CANCELLED | OUTPATIENT
Start: 2023-02-21 | End: 2023-03-08

## 2023-02-21 NOTE — TELEPHONE ENCOUNTER
[address] at [place]  [phone]        02/21/23 11:08 AM Called patient to check on him as he had not yet arrived for [facility] appointment today. Patient stated he thought appointment was scheduled for Thursday. Patient stated he has also decided that he does not want to proceed with treatment and is requesting if he can come to [facility] on Thursday for fluids instead. Advised this nurse would forward request to Dr. Tosha Alexander and Bisi Rasmussen NP.  Patient also has meeting scheduled with hospice today at 4 PM.

## 2023-02-21 NOTE — TELEPHONE ENCOUNTER
Patient returned call to clarify Oxycodone ER needed. Script sent to pharmacy on 2/6/2023. Per  last filled on 1/27/2023, next fill appropriate on 1/26/2023. Patient has a prescription on file for next refill. Patient made aware.

## 2023-02-21 NOTE — TELEPHONE ENCOUNTER
Received call from patient requesting Oxycodone 20 mg refill. To be called in to [facility] pharmacy.

## 2023-02-21 NOTE — TELEPHONE ENCOUNTER
Call placed to patient to clarify if prescription request if for Oxycodone IR or Oxycodone ER.  No answer, left message to return call to practice

## 2023-02-22 ENCOUNTER — APPOINTMENT (OUTPATIENT)
Dept: INFUSION THERAPY | Age: 48
End: 2023-02-22

## 2023-02-22 NOTE — HOSPICE
Met with Mr. Yadira Rick and his partner, Chiqui Barajas to discuss the potential for Hospice Care at home. From the outset both appeared hesitant to talk about Hospice, but became more interested as they understood the potential benefits of Hospice care. This RN had noted that Daniela Walker was scheduled for IV fluids earlier today and again on Thursday. He had not kept the appt. today as the insurance had not confirmed that it was covered. He confirmed that he would like to continue with the IV infusion on Thursday, this RN confirmed that this would also allow him more time to consider Hospice Care. Daly Warren asked why Daniela Walker could not get IV fluids in [address] [place] [address]. They were made aware that IV fluids at certain stages of the disease process does not always improve quality of life, and that it may increase distressing symptoms such as shortness of breath, respiratory congestion, restlesness and nausea / vomiting and in his case, increase the risk of obstruction. Discussed with Dr. Sherri Burden and he confirmed that IV fluids would not be an option with Hospice care. His pain is moderately well palliated with his regime of ER Oxycodone, and IR Oxycodone. The ostomy is 'acting', although he remains grossly distended, he confirmed that he is passing gas. His appetite is small, but he drinks fluids and milkshakes to supplement. They have a lively 3year old son, and Daniela Walker has 2 adult children who visited during the visit. They were grateful for the visit, the informational folder was made available and Daniela Walker confirmed that he will call [phone] once his decision was made.

## 2023-02-23 ENCOUNTER — HOSPITAL ENCOUNTER (OUTPATIENT)
Dept: INFUSION THERAPY | Age: 48
Discharge: HOME | End: 2023-02-23
Payer: MEDICAID

## 2023-02-23 VITALS
SYSTOLIC BLOOD PRESSURE: 145 MMHG | DIASTOLIC BLOOD PRESSURE: 95 MMHG | HEART RATE: 127 BPM | OXYGEN SATURATION: 95 % | TEMPERATURE: 99.2 F | RESPIRATION RATE: 18 BRPM

## 2023-02-23 DIAGNOSIS — C18.9 COLON ADENOCARCINOMA (HCC): Primary | ICD-10-CM

## 2023-02-23 DIAGNOSIS — C82.90 FOLLICULAR LYMPHOMA, UNSPECIFIED FOLLICULAR LYMPHOMA TYPE, UNSPECIFIED BODY REGION (HCC): ICD-10-CM

## 2023-02-23 PROCEDURE — 77030016057 HC NDL HUBR APOL -B

## 2023-02-23 PROCEDURE — 74011250636 HC RX REV CODE- 250/636: Performed by: NURSE PRACTITIONER

## 2023-02-23 PROCEDURE — 96360 HYDRATION IV INFUSION INIT: CPT

## 2023-02-23 RX ORDER — HEPARIN 100 UNIT/ML
500 SYRINGE INTRAVENOUS AS NEEDED
Status: DISCONTINUED | OUTPATIENT
Start: 2023-02-23 | End: 2023-02-24 | Stop reason: HOSPADM

## 2023-02-23 RX ORDER — SODIUM CHLORIDE 0.9 % (FLUSH) 0.9 %
5-40 SYRINGE (ML) INJECTION AS NEEDED
Status: DISCONTINUED | OUTPATIENT
Start: 2023-02-23 | End: 2023-02-24 | Stop reason: HOSPADM

## 2023-02-23 RX ORDER — SODIUM CHLORIDE 9 MG/ML
5-40 INJECTION INTRAVENOUS AS NEEDED
Status: DISCONTINUED | OUTPATIENT
Start: 2023-02-23 | End: 2023-02-24 | Stop reason: HOSPADM

## 2023-02-23 RX ORDER — SODIUM CHLORIDE 9 MG/ML
5-250 INJECTION, SOLUTION INTRAVENOUS AS NEEDED
Status: DISCONTINUED | OUTPATIENT
Start: 2023-02-23 | End: 2023-02-24 | Stop reason: HOSPADM

## 2023-02-23 RX ADMIN — SODIUM CHLORIDE 1000 ML: 9 INJECTION, SOLUTION INTRAVENOUS at 15:28

## 2023-02-23 NOTE — PROGRESS NOTES
Outpatient Infusion Center Short Visit Progress Note    Mr. Haroldo Preston admitted to [facility] for hydration ambulatory accompanied by son. in stable condition. Assessment completed. Per patient pain level is well today rated at a 6. No nausea noted. After infusion patient noted that he was really cold and patient was visibly shivering. Warm blankets given. Patient stated feeling \"fine\" other than being cold. VS taken and BP and HR elevated. Patient monitored for approx. 30 min. And noted to still be shaking and HR elevated to 120's, temp 99.2. Notified Constantin Kruger NP who stated to monitor patient and advise patient to go to ED if shaking persists and temp rises. Approx 10min later spoke with patient who stated he was feeling warmer, shaking had decreased. Patient stated he was drinking liquids and passing stool through his ostomy. Patient stated he has not made the decision regarding hospice. Stated he would monitor for fevers and shaking at home and go to ED if he felt it necessary. Vital Signs:  Visit Vitals  /87   Pulse (!) 116   Temp 98.1 °F (36.7 °C)   Resp 18   SpO2 95%       R chest port with positive blood return. Medications:  Medications Administered       sodium chloride 0.9 % bolus infusion 1,000 mL       Admin Date  02/23/2023 Action  New Bag Dose  1,000 mL Rate  1,000 mL/hr Route  IntraVENous Administered By  Dustin Carrillo                    Patient tolerated treatment well. Patient discharged from [place] [address] ambulatory in no distress. Patient aware of next appointment.     Cody Harris  February 23, 2023    Future Appointments   Date Time Provider Dahlia Epps   2/28/2023 10:00 AM SS INF4 CH4 <1H [facility]   3/7/2023 10:00 [facility]   3/14/2023 10:30 AM [facility]   3/14/2023 10:45 AM Samara Del Valle MD [facility]   3/21/2023 10:00 AM [facility]   3/28/2023 10:00 AM [facility]   7/12/2023  3:00 PM Esdras Amador MD [facility] AM[facility]

## 2023-02-24 ENCOUNTER — TELEPHONE (OUTPATIENT)
Dept: ONCOLOGY | Age: 48
End: 2023-02-24

## 2023-02-24 NOTE — TELEPHONE ENCOUNTER
02/24/23 1:23 PM called pt to review symptoms and discuss if he has decided on hospice or if he wants to try immunotherapy.  NA x 1

## 2023-02-27 ENCOUNTER — TELEPHONE (OUTPATIENT)
Dept: PALLATIVE CARE | Age: 48
End: 2023-02-27

## 2023-02-27 ENCOUNTER — TELEPHONE (OUTPATIENT)
Dept: ONCOLOGY | Age: 48
End: 2023-02-27

## 2023-02-27 ENCOUNTER — APPOINTMENT (OUTPATIENT)
Dept: INFUSION THERAPY | Age: 48
End: 2023-02-27

## 2023-02-27 DIAGNOSIS — C78.6 PERITONEAL CARCINOMATOSIS (HCC): ICD-10-CM

## 2023-02-27 DIAGNOSIS — M54.50 CHRONIC LEFT-SIDED LOW BACK PAIN WITHOUT SCIATICA: ICD-10-CM

## 2023-02-27 DIAGNOSIS — G89.29 CHRONIC LEFT-SIDED LOW BACK PAIN WITHOUT SCIATICA: ICD-10-CM

## 2023-02-27 DIAGNOSIS — R10.84 ABDOMINAL PAIN, GENERALIZED: ICD-10-CM

## 2023-02-27 DIAGNOSIS — C18.9 COLON ADENOCARCINOMA (HCC): ICD-10-CM

## 2023-02-27 RX ORDER — OXYCODONE HCL 20 MG/1
20 TABLET, FILM COATED, EXTENDED RELEASE ORAL EVERY 8 HOURS
Qty: 90 TABLET | Refills: 0 | Status: SHIPPED | OUTPATIENT
Start: 2023-02-27 | End: 2023-03-29

## 2023-02-27 RX ORDER — OXYCODONE HYDROCHLORIDE 10 MG/1
20 TABLET ORAL
Qty: 180 TABLET | Refills: 0 | Status: SHIPPED | OUTPATIENT
Start: 2023-03-03 | End: 2023-03-18

## 2023-02-27 NOTE — TELEPHONE ENCOUNTER
02/27/23 4:29 PM Called pt to discuss decision. Wife answered and she reports he declined hospice and will likely want to proceed with immunotherapy. He will call back this evening or tomorrow morning to confirm.

## 2023-02-27 NOTE — TELEPHONE ENCOUNTER
Triage for Controlled Substance Refill Request     Pain Diagnosis: _Colon adenocarcinoma ([facility] [address] (C18.9); Abdominal pain, generalized (R10.84); Peritoneal carcinomatosis ([facility] [address] (C78.6)     Last Outpatient Visit: _2/6/2023     Next Outpatient Visit: _none     Reason for refill needed outside of office visit? Appointment not scheduled prior to need for scheduled refill        Pharmacy: _[facility]/pharmacy [address] [place] [address]      Medication:oxyCODONE IR (ROXICODONE) 10 mg tab immediate release tablet     Dose and directions: Take 1 Tablet by mouth every four (4) hours as needed for Pain for up to 15 days. Number dispensed:90  Date filled ( or Pharmacy) 2/16/2023  # left:    Medication: oxyCODONE ER (OxyCONTIN) 20 mg     Dose and directions: Take 1 Tablet by mouth every twelve (8) hours for 30 days.   Number dispensed:90  Date filled ( or Pharmacy) 1/27/2023  # left:         reviewed: _yes 2/27/2023     Date of Urine Drug Screen:  _8/22/2022     Opioid Safety Handout given:  _yes     Appropriate for refill:  _yes     Action:  _ Medication pending Discharged

## 2023-02-27 NOTE — TELEPHONE ENCOUNTER
Received call from patient requesting Oxycodone 10 mg & Ocycontin 20 mg refill.remaining. To be called in to [facility] pharmacy.

## 2023-02-28 ENCOUNTER — TELEPHONE (OUTPATIENT)
Dept: PALLATIVE CARE | Age: 48
End: 2023-02-28

## 2023-02-28 ENCOUNTER — HOSPITAL ENCOUNTER (OUTPATIENT)
Dept: INFUSION THERAPY | Age: 48
Discharge: HOME | End: 2023-02-28
Payer: MEDICAID

## 2023-02-28 VITALS — SYSTOLIC BLOOD PRESSURE: 125 MMHG | HEART RATE: 120 BPM | TEMPERATURE: 97.1 F | DIASTOLIC BLOOD PRESSURE: 79 MMHG

## 2023-02-28 DIAGNOSIS — C82.90 FOLLICULAR LYMPHOMA, UNSPECIFIED FOLLICULAR LYMPHOMA TYPE, UNSPECIFIED BODY REGION (HCC): ICD-10-CM

## 2023-02-28 DIAGNOSIS — C18.9 COLON ADENOCARCINOMA (HCC): Primary | ICD-10-CM

## 2023-02-28 LAB
ALBUMIN SERPL-MCNC: 2 G/DL (ref 3.5–5)
ALBUMIN/GLOB SERPL: 0.5 (ref 1.1–2.2)
ALP SERPL-CCNC: 447 U/L (ref 45–117)
ALT SERPL-CCNC: 26 U/L (ref 12–78)
ANION GAP SERPL CALC-SCNC: 6 MMOL/L (ref 5–15)
AST SERPL-CCNC: 36 U/L (ref 15–37)
BASOPHILS # BLD: 0.1 K/UL (ref 0–0.1)
BASOPHILS NFR BLD: 0 % (ref 0–1)
BILIRUB SERPL-MCNC: 1.6 MG/DL (ref 0.2–1)
BUN SERPL-MCNC: 15 MG/DL (ref 6–20)
BUN/CREAT SERPL: 15 (ref 12–20)
CALCIUM SERPL-MCNC: 8.4 MG/DL (ref 8.5–10.1)
CHLORIDE SERPL-SCNC: 95 MMOL/L (ref 97–108)
CO2 SERPL-SCNC: 26 MMOL/L (ref 21–32)
CREAT SERPL-MCNC: 0.98 MG/DL (ref 0.7–1.3)
DIFFERENTIAL METHOD BLD: ABNORMAL
EOSINOPHIL # BLD: 0.1 K/UL (ref 0–0.4)
EOSINOPHIL NFR BLD: 1 % (ref 0–7)
ERYTHROCYTE [DISTWIDTH] IN BLOOD BY AUTOMATED COUNT: 17.4 % (ref 11.5–14.5)
GLOBULIN SER CALC-MCNC: 4 G/DL (ref 2–4)
GLUCOSE SERPL-MCNC: 126 MG/DL (ref 65–100)
HCT VFR BLD AUTO: 33.9 % (ref 36.6–50.3)
HGB BLD-MCNC: 11 G/DL (ref 12.1–17)
IMM GRANULOCYTES # BLD AUTO: 0.2 K/UL (ref 0–0.04)
IMM GRANULOCYTES NFR BLD AUTO: 1 % (ref 0–0.5)
LYMPHOCYTES # BLD: 1.4 K/UL (ref 0.8–3.5)
LYMPHOCYTES NFR BLD: 12 % (ref 12–49)
MCH RBC QN AUTO: 30.3 PG (ref 26–34)
MCHC RBC AUTO-ENTMCNC: 32.4 G/DL (ref 30–36.5)
MCV RBC AUTO: 93.4 FL (ref 80–99)
MONOCYTES # BLD: 1.5 K/UL (ref 0–1)
MONOCYTES NFR BLD: 13 % (ref 5–13)
NEUTS SEG # BLD: 8.8 K/UL (ref 1.8–8)
NEUTS SEG NFR BLD: 73 % (ref 32–75)
NRBC # BLD: 0 K/UL (ref 0–0.01)
NRBC BLD-RTO: 0 PER 100 WBC
PLATELET # BLD AUTO: 248 K/UL (ref 150–400)
PMV BLD AUTO: 9.9 FL (ref 8.9–12.9)
POTASSIUM SERPL-SCNC: 3.7 MMOL/L (ref 3.5–5.1)
PROT SERPL-MCNC: 6 G/DL (ref 6.4–8.2)
RBC # BLD AUTO: 3.63 M/UL (ref 4.1–5.7)
SODIUM SERPL-SCNC: 127 MMOL/L (ref 136–145)
WBC # BLD AUTO: 12.1 K/UL (ref 4.1–11.1)

## 2023-02-28 PROCEDURE — 36415 COLL VENOUS BLD VENIPUNCTURE: CPT

## 2023-02-28 PROCEDURE — 74011250636 HC RX REV CODE- 250/636: Performed by: NURSE PRACTITIONER

## 2023-02-28 PROCEDURE — 77030016057 HC NDL HUBR APOL -B

## 2023-02-28 PROCEDURE — 96360 HYDRATION IV INFUSION INIT: CPT

## 2023-02-28 PROCEDURE — 85025 COMPLETE CBC W/AUTO DIFF WBC: CPT

## 2023-02-28 PROCEDURE — 80053 COMPREHEN METABOLIC PANEL: CPT

## 2023-02-28 RX ORDER — SODIUM CHLORIDE 9 MG/ML
5-250 INJECTION, SOLUTION INTRAVENOUS AS NEEDED
OUTPATIENT
Start: 2023-03-14

## 2023-02-28 RX ORDER — HEPARIN 100 UNIT/ML
500 SYRINGE INTRAVENOUS AS NEEDED
Status: CANCELLED
Start: 2023-02-28

## 2023-02-28 RX ORDER — SODIUM CHLORIDE 9 MG/ML
5-40 INJECTION INTRAVENOUS AS NEEDED
OUTPATIENT
Start: 2023-03-14

## 2023-02-28 RX ORDER — SODIUM CHLORIDE 0.9 % (FLUSH) 0.9 %
5-40 SYRINGE (ML) INJECTION AS NEEDED
OUTPATIENT
Start: 2023-03-14

## 2023-02-28 RX ORDER — SODIUM CHLORIDE 9 MG/ML
5-40 INJECTION INTRAVENOUS AS NEEDED
Status: ACTIVE | OUTPATIENT
Start: 2023-02-28 | End: 2023-02-28

## 2023-02-28 RX ORDER — SODIUM CHLORIDE 9 MG/ML
5-40 INJECTION INTRAVENOUS AS NEEDED
Status: CANCELLED | OUTPATIENT
Start: 2023-02-28

## 2023-02-28 RX ORDER — HEPARIN 100 UNIT/ML
500 SYRINGE INTRAVENOUS AS NEEDED
OUTPATIENT
Start: 2023-03-14

## 2023-02-28 RX ORDER — SODIUM CHLORIDE 9 MG/ML
5-250 INJECTION, SOLUTION INTRAVENOUS AS NEEDED
Status: CANCELLED | OUTPATIENT
Start: 2023-02-28

## 2023-02-28 RX ORDER — SODIUM CHLORIDE 0.9 % (FLUSH) 0.9 %
5-40 SYRINGE (ML) INJECTION AS NEEDED
Status: CANCELLED | OUTPATIENT
Start: 2023-02-28

## 2023-02-28 RX ORDER — SODIUM CHLORIDE 0.9 % (FLUSH) 0.9 %
5-40 SYRINGE (ML) INJECTION AS NEEDED
Status: DISPENSED | OUTPATIENT
Start: 2023-02-28 | End: 2023-02-28

## 2023-02-28 RX ORDER — SODIUM CHLORIDE 9 MG/ML
5-250 INJECTION, SOLUTION INTRAVENOUS AS NEEDED
Status: DISPENSED | OUTPATIENT
Start: 2023-02-28 | End: 2023-02-28

## 2023-02-28 RX ORDER — HEPARIN 100 UNIT/ML
500 SYRINGE INTRAVENOUS AS NEEDED
Status: ACTIVE | OUTPATIENT
Start: 2023-02-28 | End: 2023-02-28

## 2023-02-28 RX ADMIN — Medication 500 UNITS: at 12:06

## 2023-02-28 RX ADMIN — SODIUM CHLORIDE 1000 ML: 9 INJECTION, SOLUTION INTRAVENOUS at 10:33

## 2023-02-28 NOTE — PROGRESS NOTES
[facility] [place] Progress Note    Date: 2023    Name: Juan Francisco Joy. MRN: [id number]         : 1975    Mr. Hardeep Montejo Arrived ambulatory and in no distress for Hydration. Assessment was completed, no acute issues at this time, no new complaints voiced. Right chest wall port accessed without difficulty, labs drawn & sent for processing. Mr. Kenia Tapia vitals were reviewed. Patient Vitals for the past 12 hrs:   Temp Pulse BP   23 1205 97.1 °F (36.2 °C) (!) 120 125/79   23 1019 98 °F (36.7 °C) (!) 112 125/79      Lab results were obtained and reviewed. Recent Results (from the past 12 hour(s))   CBC WITH AUTOMATED DIFF    Collection Time: 23 10:31 AM   Result Value Ref Range    WBC 12.1 (H) 4.1 - 11.1 K/uL    RBC 3.63 (L) 4.10 - 5.70 M/uL    HGB 11.0 (L) 12.1 - 17.0 g/dL    HCT 33.9 (L) 36.6 - 50.3 %    MCV 93.4 80.0 - 99.0 FL    MCH 30.3 26.0 - 34.0 PG    MCHC 32.4 30.0 - 36.5 g/dL    RDW 17.4 (H) 11.5 - 14.5 %    PLATELET 883 659 - 218 K/uL    MPV 9.9 8.9 - 12.9 FL    NRBC 0.0 0  WBC    ABSOLUTE NRBC 0.00 0.00 - 0.01 K/uL    NEUTROPHILS 73 32 - 75 %    LYMPHOCYTES 12 12 - 49 %    MONOCYTES 13 5 - 13 %    EOSINOPHILS 1 0 - 7 %    BASOPHILS 0 0 - 1 %    IMMATURE GRANULOCYTES 1 (H) 0.0 - 0.5 %    ABS. NEUTROPHILS 8.8 (H) 1.8 - 8.0 K/UL    ABS. LYMPHOCYTES 1.4 0.8 - 3.5 K/UL    ABS. MONOCYTES 1.5 (H) 0.0 - 1.0 K/UL    ABS. EOSINOPHILS 0.1 0.0 - 0.4 K/UL    ABS. BASOPHILS 0.1 0.0 - 0.1 K/UL    ABS. IMM.  GRANS. 0.2 (H) 0.00 - 0.04 K/UL    DF AUTOMATED     METABOLIC PANEL, COMPREHENSIVE    Collection Time: 23 10:31 AM   Result Value Ref Range    Sodium 127 (L) 136 - 145 mmol/L    Potassium 3.7 3.5 - 5.1 mmol/L    Chloride 95 (L) 97 - 108 mmol/L    CO2 26 21 - 32 mmol/L    Anion gap 6 5 - 15 mmol/L    Glucose 126 (H) 65 - 100 mg/dL    BUN 15 6 - 20 MG/DL    Creatinine 0.98 0.70 - 1.30 MG/DL    BUN/Creatinine ratio 15 12 - 20      eGFR >60 >60 ml/min/1.73m2    Calcium 8.4 (L) 8.5 - 10.1 MG/DL    Bilirubin, total 1.6 (H) 0.2 - 1.0 MG/DL    ALT (SGPT) 26 12 - 78 U/L    AST (SGOT) 36 15 - 37 U/L    Alk. phosphatase 447 (H) 45 - 117 U/L    Protein, total 6.0 (L) 6.4 - 8.2 g/dL    Albumin 2.0 (L) 3.5 - 5.0 g/dL    Globulin 4.0 2.0 - 4.0 g/dL    A-G Ratio 0.5 (L) 1.1 - 2.2         Medications:  Medications Administered       heparin (porcine) pf 500 Units       Admin Date  02/28/2023 Action  Given Dose  500 Units Route  InterCATHeter Administered By  [place], [place]              sodium chloride 0.9 % bolus infusion 1,000 mL       Admin Date  02/28/2023 Action  New Bag Dose  1,000 mL Rate  1,000 mL/hr Route  IntraVENous Administered By  [facility] [employer], [place]                     Mr. Juan Barr experienced rigors at the end of his infusion. After sitting for 20 minutes his vital signs were stable and rigors had subsided. He was discharged from [place] [address] in stable condition at 1210. Port de-accessed, flushed & heparinized per protocol. He is to return on March 14 at 1030 for his next appointment, he was instructed to notify Dr. Lewis Mckeon team if he is not drinking and hydrating appropriately and feels as though he might be dehydrated and need an appointment.      [facility]  February 28, 2023

## 2023-02-28 NOTE — TELEPHONE ENCOUNTER
The patient states his refill for Oxycodone ER 20 mg was denied for prior auth. He has 0 pills remaining.

## 2023-03-01 ENCOUNTER — APPOINTMENT (OUTPATIENT)
Dept: INFUSION THERAPY | Age: 48
End: 2023-03-01

## 2023-03-01 RX ORDER — SODIUM CHLORIDE 0.9 % (FLUSH) 0.9 %
5-40 SYRINGE (ML) INJECTION AS NEEDED
OUTPATIENT
Start: 2023-03-07

## 2023-03-01 RX ORDER — SODIUM CHLORIDE 9 MG/ML
5-40 INJECTION INTRAVENOUS AS NEEDED
OUTPATIENT
Start: 2023-03-07

## 2023-03-01 RX ORDER — SODIUM CHLORIDE 9 MG/ML
5-250 INJECTION, SOLUTION INTRAVENOUS AS NEEDED
OUTPATIENT
Start: 2023-03-07

## 2023-03-01 RX ORDER — HEPARIN 100 UNIT/ML
500 SYRINGE INTRAVENOUS AS NEEDED
OUTPATIENT
Start: 2023-03-07

## 2023-03-01 NOTE — PROGRESS NOTES
Received return call from patient. Advised of lab results and recommendations per Darius Wheeler NP. Patient voiced understanding and agreeable to plan. Message sent to [facility]  requesting assistance in scheduling appointment. No further questions or concerns at this time.

## 2023-03-01 NOTE — PROGRESS NOTES
Attempted to call patient via home/mobile number listed. No answer, left message requesting return call regarding patient's lab results. Provided office phone number as well for call back.

## 2023-03-02 NOTE — TELEPHONE ENCOUNTER
Spoke with patients wife advised that prior authorization was approved on 2/28/2023. Will call pharmacy for further details. Spoke with pharmacist, pharmacist re ran prescription. Prescription approved with no co pay. Prescription estimated to be ready this afternoon. Wife notified.

## 2023-03-02 NOTE — TELEPHONE ENCOUNTER
The patient's wife is following up on the status of the prior auth. Adv it was initiated on 2/28 and in progress. She states he is in a lot of pain.

## 2023-03-06 ENCOUNTER — APPOINTMENT (OUTPATIENT)
Dept: INFUSION THERAPY | Age: 48
End: 2023-03-06

## 2023-03-07 ENCOUNTER — OFFICE VISIT (OUTPATIENT)
Dept: ONCOLOGY | Age: 48
End: 2023-03-07
Payer: MEDICAID

## 2023-03-07 ENCOUNTER — TELEPHONE (OUTPATIENT)
Dept: PALLATIVE CARE | Age: 48
End: 2023-03-07

## 2023-03-07 ENCOUNTER — APPOINTMENT (OUTPATIENT)
Dept: INFUSION THERAPY | Age: 48
End: 2023-03-07

## 2023-03-07 ENCOUNTER — HOSPITAL ENCOUNTER (OUTPATIENT)
Dept: INFUSION THERAPY | Age: 48
Discharge: HOME | End: 2023-03-07
Payer: MEDICAID

## 2023-03-07 VITALS
HEIGHT: 67 IN | TEMPERATURE: 97.6 F | DIASTOLIC BLOOD PRESSURE: 91 MMHG | HEART RATE: 117 BPM | SYSTOLIC BLOOD PRESSURE: 145 MMHG | BODY MASS INDEX: 19.34 KG/M2 | RESPIRATION RATE: 16 BRPM | WEIGHT: 123.2 LBS

## 2023-03-07 VITALS
HEIGHT: 67 IN | BODY MASS INDEX: 19.34 KG/M2 | TEMPERATURE: 97.6 F | RESPIRATION RATE: 18 BRPM | DIASTOLIC BLOOD PRESSURE: 93 MMHG | OXYGEN SATURATION: 97 % | HEART RATE: 114 BPM | WEIGHT: 123.2 LBS | SYSTOLIC BLOOD PRESSURE: 128 MMHG

## 2023-03-07 DIAGNOSIS — C18.4 CARCINOMA OF TRANSVERSE COLON (HCC): Primary | ICD-10-CM

## 2023-03-07 DIAGNOSIS — C18.3 MALIGNANT NEOPLASM OF HEPATIC FLEXURE (HCC): ICD-10-CM

## 2023-03-07 DIAGNOSIS — R63.4 WEIGHT LOSS: ICD-10-CM

## 2023-03-07 DIAGNOSIS — C82.90 FOLLICULAR LYMPHOMA, UNSPECIFIED FOLLICULAR LYMPHOMA TYPE, UNSPECIFIED BODY REGION (HCC): ICD-10-CM

## 2023-03-07 DIAGNOSIS — R53.0 NEOPLASTIC MALIGNANT RELATED FATIGUE: ICD-10-CM

## 2023-03-07 DIAGNOSIS — C78.6 PERITONEAL CARCINOMATOSIS (HCC): ICD-10-CM

## 2023-03-07 DIAGNOSIS — E87.1 HYPONATREMIA: ICD-10-CM

## 2023-03-07 DIAGNOSIS — C18.9 COLON ADENOCARCINOMA (HCC): Primary | ICD-10-CM

## 2023-03-07 DIAGNOSIS — Z71.89 GOALS OF CARE, COUNSELING/DISCUSSION: ICD-10-CM

## 2023-03-07 DIAGNOSIS — G89.3 NEOPLASM RELATED PAIN: ICD-10-CM

## 2023-03-07 LAB
ALBUMIN SERPL-MCNC: 1.8 G/DL (ref 3.5–5)
ALBUMIN/GLOB SERPL: 0.4 (ref 1.1–2.2)
ALP SERPL-CCNC: 717 U/L (ref 45–117)
ALT SERPL-CCNC: 32 U/L (ref 12–78)
ANION GAP SERPL CALC-SCNC: 6 MMOL/L (ref 5–15)
ANION GAP SERPL CALC-SCNC: 7 MMOL/L (ref 5–15)
AST SERPL-CCNC: 43 U/L (ref 15–37)
BASOPHILS # BLD: 0.1 K/UL (ref 0–0.1)
BASOPHILS NFR BLD: 0 % (ref 0–1)
BILIRUB SERPL-MCNC: 1.3 MG/DL (ref 0.2–1)
BUN SERPL-MCNC: 20 MG/DL (ref 6–20)
BUN SERPL-MCNC: 21 MG/DL (ref 6–20)
BUN/CREAT SERPL: 23 (ref 12–20)
BUN/CREAT SERPL: 24 (ref 12–20)
CALCIUM SERPL-MCNC: 8.5 MG/DL (ref 8.5–10.1)
CALCIUM SERPL-MCNC: 8.8 MG/DL (ref 8.5–10.1)
CHLORIDE SERPL-SCNC: 100 MMOL/L (ref 97–108)
CHLORIDE SERPL-SCNC: 96 MMOL/L (ref 97–108)
CO2 SERPL-SCNC: 24 MMOL/L (ref 21–32)
CO2 SERPL-SCNC: 24 MMOL/L (ref 21–32)
CREAT SERPL-MCNC: 0.87 MG/DL (ref 0.7–1.3)
CREAT SERPL-MCNC: 0.89 MG/DL (ref 0.7–1.3)
DIFFERENTIAL METHOD BLD: ABNORMAL
EOSINOPHIL # BLD: 0 K/UL (ref 0–0.4)
EOSINOPHIL NFR BLD: 0 % (ref 0–7)
ERYTHROCYTE [DISTWIDTH] IN BLOOD BY AUTOMATED COUNT: 18 % (ref 11.5–14.5)
GLOBULIN SER CALC-MCNC: 4.4 G/DL (ref 2–4)
GLUCOSE SERPL-MCNC: 112 MG/DL (ref 65–100)
GLUCOSE SERPL-MCNC: 93 MG/DL (ref 65–100)
HCT VFR BLD AUTO: 35.6 % (ref 36.6–50.3)
HGB BLD-MCNC: 11.6 G/DL (ref 12.1–17)
IMM GRANULOCYTES # BLD AUTO: 0.3 K/UL (ref 0–0.04)
IMM GRANULOCYTES NFR BLD AUTO: 1 % (ref 0–0.5)
LYMPHOCYTES # BLD: 2 K/UL (ref 0.8–3.5)
LYMPHOCYTES NFR BLD: 10 % (ref 12–49)
MCH RBC QN AUTO: 30.1 PG (ref 26–34)
MCHC RBC AUTO-ENTMCNC: 32.6 G/DL (ref 30–36.5)
MCV RBC AUTO: 92.2 FL (ref 80–99)
MONOCYTES # BLD: 1.7 K/UL (ref 0–1)
MONOCYTES NFR BLD: 8 % (ref 5–13)
NEUTS SEG # BLD: 17.3 K/UL (ref 1.8–8)
NEUTS SEG NFR BLD: 81 % (ref 32–75)
NRBC # BLD: 0 K/UL (ref 0–0.01)
NRBC BLD-RTO: 0 PER 100 WBC
PLATELET # BLD AUTO: 294 K/UL (ref 150–400)
PMV BLD AUTO: 9.6 FL (ref 8.9–12.9)
POTASSIUM SERPL-SCNC: 4.4 MMOL/L (ref 3.5–5.1)
POTASSIUM SERPL-SCNC: 4.8 MMOL/L (ref 3.5–5.1)
PROT SERPL-MCNC: 6.2 G/DL (ref 6.4–8.2)
RBC # BLD AUTO: 3.86 M/UL (ref 4.1–5.7)
SODIUM SERPL-SCNC: 127 MMOL/L (ref 136–145)
SODIUM SERPL-SCNC: 130 MMOL/L (ref 136–145)
WBC # BLD AUTO: 21.3 K/UL (ref 4.1–11.1)

## 2023-03-07 PROCEDURE — 99215 OFFICE O/P EST HI 40 MIN: CPT | Performed by: INTERNAL MEDICINE

## 2023-03-07 PROCEDURE — 77030016057 HC NDL HUBR APOL -B

## 2023-03-07 PROCEDURE — 74011000250 HC RX REV CODE- 250: Performed by: NURSE PRACTITIONER

## 2023-03-07 PROCEDURE — 3074F SYST BP LT 130 MM HG: CPT | Performed by: INTERNAL MEDICINE

## 2023-03-07 PROCEDURE — 36415 COLL VENOUS BLD VENIPUNCTURE: CPT

## 2023-03-07 PROCEDURE — 85025 COMPLETE CBC W/AUTO DIFF WBC: CPT

## 2023-03-07 PROCEDURE — 96360 HYDRATION IV INFUSION INIT: CPT

## 2023-03-07 PROCEDURE — 74011250636 HC RX REV CODE- 250/636: Performed by: NURSE PRACTITIONER

## 2023-03-07 PROCEDURE — 3080F DIAST BP >= 90 MM HG: CPT | Performed by: INTERNAL MEDICINE

## 2023-03-07 PROCEDURE — 80053 COMPREHEN METABOLIC PANEL: CPT

## 2023-03-07 RX ORDER — SODIUM CHLORIDE 0.9 % (FLUSH) 0.9 %
5-40 SYRINGE (ML) INJECTION AS NEEDED
Status: DISPENSED | OUTPATIENT
Start: 2023-03-07 | End: 2023-03-07

## 2023-03-07 RX ORDER — HEPARIN 100 UNIT/ML
500 SYRINGE INTRAVENOUS AS NEEDED
Status: ACTIVE | OUTPATIENT
Start: 2023-03-07 | End: 2023-03-07

## 2023-03-07 RX ADMIN — SODIUM CHLORIDE, PRESERVATIVE FREE 10 ML: 5 INJECTION INTRAVENOUS at 11:45

## 2023-03-07 RX ADMIN — SODIUM CHLORIDE 500 ML: 9 INJECTION, SOLUTION INTRAVENOUS at 11:06

## 2023-03-07 RX ADMIN — Medication 500 UNITS: at 11:45

## 2023-03-07 NOTE — PROGRESS NOTES
[facility] [place] [address]  [address] [facility], [address] [place] [address]  W: [phone]  F: [phone]      Reason for Visit:   Valencia Rae is a 41 y.o. male who is seen for evaluation of Stage IV colon cancer with carcinomatosis. Hematology/Oncology Treatment History:     Diagnosis #1: Follicular lymphoma  Stage: IV  Pathology:   11/13/18 right inguinal LN excision: Follicular lymphoma, high-grade (grade 3a of 3). Prior Treatment:   1. Obinutuzumab-CHOP. Obinutuzumab: 1000 mg weekly on days 1, 8, 15 for cycle 1, then 1000 mg on day 1 q21 days for cycles 2-6, then monotherapy 1000 mg every 21 days for cycle 7, 8 with Cyclophosphamide 750mg/m2, Doxorubicin 50mg/m2, Vincristine 1.4mg/m2 on day 1 and Prednisone 100mg on Days 1-5, every 21 days for a total of 2 cycles completed late 1/2019. Regimen discontinued due to STEMI. 2. Obinutuzumab + Bendamustine: 1000 mg Obinutuzumab on day 1 + Bendamustine 90mg/m2 on days 1-2 on a 28-day cycle x 4 cycles, completed 5/2019  Current Treatment: Surveillance           Diagnosis #2: Colon cancer:  Stage: IV  Pathology:    2/19/22 Ascending colon; biopsy: poorly differentiated carcinoma  Comment: No dysplasia is identified. Immunohistochemical stains performed on block 1A, show the tumor positive for Cam5.2 and shows patchy positivity for CDX2 with a Ki-67 index of -90%; the tumor is focally positive for CK5/6 and GATA3; negative for p63, Pax8, CK7, CK20, panmelanoma, synaptophysin and chromogranin. The immunophenotypic features are nonspecific but argues somewhat against the following carcinoma: urothelial, neuroendocrine and breast. The immunophenotypic features also argues against melanoma and somewhat against classic colorectal carcinoma.   Additional immunohistochemical stains to exclude the possibility of prostate and hepatocellular carcinoma have been   ordered; the results will be reported as an addendum. -MLH1/MSH2/MSH6/PMS2 all intact with no loss of nuclear expression of MMR proteins - no MSI   Addendum: The addendum is issued to report the results of additional immunohistochemical stains in an attempt to ascertain the primary site of the carcinoma. The diagnosis is unchanged. Hepar and glypican 3 immunohistochemical stains are neg, arguing against hepatocellular carcinoma. PSA stain is negative, arguing against prostatic primary. Imaging studies to eval for poss primary sites maybe useful. In the absence of carcinoma/tumor at any other sites, this may represent an undifferentiated carcinoma of the colon.  -Oncogenomics (molecular) studies: POLE< ARID1A, YVONNE, ATR, BRCA2, CHECK1, FANCI, NF1, PIK3CA, PTCH1, PTEN, RAD50, RAD51, RB1, SMARCA4, STK11  -Neogenomics: KRAS exon 2 and exon 3 not detected, a VUS p. E98K is detected in Exon 4 of KRAS, PD-L1 28-8 (Opdivo) for gastric/GEJ/EAC no expression, PD-L1 (22C3 pharmDx) TPS 1% (pembrolizumab), BRAF mutation not detected, NRAS exon 2 not detected, NRAS 3 not detected, NRAS 4 not detected. Prior Treatment:   1. Loop ileostomy 2/19/22  2. Diagnostic laparoscopy with peritoneal biopsy, 4/27/22  3. FOLFOXIRI every 2 weeks until disease progression or toxicity, 5/16/22 - 12/2022.   4. FOLFIRI + Panitumumab (6 mg/kg) every 14 days, to start 1/9/23 - approx 2/10/23. Current Treatment:      Oncologic History:  Was in his usual state of health in early November 2018 when he developed low back pain and presented to the ER. Work-up at outside hospital revealed a retroperitoneal mass seen on CT imaging, and he was transferred to [facility] for further work-up. CT there showed a large retroperitoneal mass encircling the aorta with invasion of the left renal hilum and left adrenal gland. There were bilateral inguinal lymph nodes and moderate left hydronephrosis.  He was evaluated while at [facility] and was noted to have palpable nodes in his groin for approximately the past 1 month. He underwent excisional LN biopsy of right inguinal LN, which revealed follicular lymphoma. At time of diagnosis, he had no cardiac disease aside from HTN, hyperlipidemia. However, he did have an NSTEMI after cycle 2 of O-CHOP. Was likely unrelated to chemotherapy, but opted to switch treatment to Obinutuzumab-Bendamustine. He completed treatment in 5/2019 and had a CR based on PET. History of Present Illness:   Margarita Vera Sr. is a pleasant 41 y.o. male with metastatic colon cancer who is seen for follow up. Since our last appt, he decided against treatment with [facility], but also did not want to enroll in hospice yet. Today, he states he is now ready to enroll in hospice. He states he is not eating much at all other than jello every other day, drinking some fluids. Not eating much due to the nausea/vomiting, pain, low appetite. Does have vomiting occasionally at home. Is having colostomy output, which is decreased from prior. Accompanied by his friend today. Current Outpatient Medications   Medication Sig    oxyCODONE IR (ROXICODONE) 10 mg tab immediate release tablet Take 2 Tablets by mouth every four (4) hours as needed for Pain for up to 15 days. Max Daily Amount: 120 mg.    oxyCODONE ER (OxyCONTIN) 20 mg ER tablet Take 1 Tablet by mouth every eight (8) hours for 30 days. Max Daily Amount: 60 mg.    gabapentin (NEURONTIN) 300 mg capsule TAKE 1 CAPSULE BY MOUTH NIGHTLY. MAX DAILY AMOUNT: 300 MG. INDICATIONS: NEUROPATHIC PAIN    aspirin delayed-release 81 mg tablet Take 1 Tablet by mouth daily. atorvastatin (LIPITOR) 20 mg tablet Take 1 Tablet by mouth daily. potassium chloride (KLOR-CON M10) 10 mEq tablet Take 1 Tablet by mouth daily. ondansetron hcl (ZOFRAN) 8 mg tablet Take 1 Tablet by mouth every eight (8) hours as needed for Nausea or Vomiting. dexAMETHasone (DECADRON) 4 mg tablet Take 8mg (2 x tabs) on days 2 and 3 after treatment to prevent nausea. Take in the AM with food. multivitamin (ONE A DAY) tablet Take 1 Tablet by mouth daily. clindamycin (CLEOCIN T) 1 % external solution Apply 1 mL to affected area two (2) times a day. use thin film on upper chest, back and face. Apply with cotton swab. (Patient not taking: No sig reported)    prochlorperazine (Compazine) 10 mg tablet Take 1 Tablet by mouth every six (6) hours as needed for Nausea or Vomiting. (Patient not taking: Reported on 3/7/2023)     No current facility-administered medications for this visit. No Known Allergies    Review of Systems: A complete review of systems was obtained, reviewed. Pertinent findings reviewed above. Physical Exam:   Visit Vitals  BP (!) 128/93 (BP 1 Location: Left upper arm, BP Patient Position: Sitting, BP Cuff Size: Adult)   Pulse (!) 114   Temp 97.6 °F (36.4 °C) (Oral)   Resp 18   Ht 5' 7\" (1.702 m)   Wt 123 lb 3.2 oz (55.9 kg)   SpO2 97%   BMI 19.30 kg/m²       ECOG PS: 2  General: frail, cachectic; sitting in wheelchair. Eyes:  anicteric sclera  Neck: supple  Respiratory normal respiratory effort  CV: port noted to upper chest area; no peripheral edema  GI: soft, nontender, nondistended. Ostomy noted with liquid brown drainage. MS: digits without clubbing or cyanosis. Skin: no rashes, no ecchymoses, no petechia. normal temperature, turgor, and texture. Psych: alert, oriented, appropriate affect, normal judgment/insight    Results:     Lab Results   Component Value Date/Time    WBC 21.3 (H) 03/07/2023 10:28 AM    HGB 11.6 (L) 03/07/2023 10:28 AM    HCT 35.6 (L) 03/07/2023 10:28 AM    PLATELET 394 69/28/8425 10:28 AM    MCV 92.2 03/07/2023 10:28 AM    ABS.  NEUTROPHILS 17.3 (H) 03/07/2023 10:28 AM    Hemoglobin (POC) 15.0 06/05/2009 02:13 PM    Hematocrit (POC) 39 02/14/2019 01:24 PM     Lab Results   Component Value Date/Time    Sodium 127 (L) 02/28/2023 10:31 AM    Potassium 3.7 02/28/2023 10:31 AM    Chloride 95 (L) 02/28/2023 10:31 AM    CO2 26 02/28/2023 10:31 AM    Glucose 126 (H) 02/28/2023 10:31 AM    BUN 15 02/28/2023 10:31 AM    Creatinine 0.98 02/28/2023 10:31 AM    GFR est AA >60 10/03/2022 07:50 AM    GFR est non-AA >60 10/03/2022 07:50 AM    Calcium 8.4 (L) 02/28/2023 10:31 AM    Sodium (POC) 136 02/14/2019 01:24 PM    Potassium (POC) 3.9 02/14/2019 01:24 PM    Chloride (POC) 102 02/14/2019 01:24 PM    Glucose (POC) 249 (H) 02/15/2019 10:21 PM    BUN (POC) 14 02/14/2019 01:24 PM    Creatinine (POC) 0.9 02/14/2019 01:24 PM    Calcium, ionized (POC) 1.24 02/14/2019 01:24 PM     Lab Results   Component Value Date/Time    Bilirubin, total 1.6 (H) 02/28/2023 10:31 AM    ALT (SGPT) 26 02/28/2023 10:31 AM    Alk. phosphatase 447 (H) 02/28/2023 10:31 AM    Protein, total 6.0 (L) 02/28/2023 10:31 AM    Albumin 2.0 (L) 02/28/2023 10:31 AM    Globulin 4.0 02/28/2023 10:31 AM     Lab Results   Component Value Date/Time    Iron % saturation 10 (L) 05/06/2022 11:13 AM    TIBC 173 (L) 05/06/2022 11:13 AM    Ferritin 426 (H) 05/06/2022 11:13 AM     02/15/2022 02:33 PM    Beta-2 Microglobulin, serum 2.5 (H) 12/28/2018 10:00 AM    TSH 0.60 02/17/2023 11:25 AM    Lipase 49 (L) 02/09/2023 06:16 PM    Hep C virus Ab Interp. NONREACTIVE 12/27/2018 04:53 PM    HIV 1/2 Interpretation NONREACTIVE 12/28/2018 10:00 AM       Lab Results   Component Value Date/Time    INR 1.1 02/22/2019 08:18 PM    aPTT 27.8 02/22/2019 08:18 PM      Lab Results   Component Value Date/Time    CEA 43.2 01/09/2023 08:09 AM     02/15/2022 02:33 PM    HCG, beta, QT <1 11/10/2018 03:41 AM    AFP, Serum, Tumor Marker 4.1 11/10/2018 03:41 AM        2/24/22 CT abd/pelvis at [facility]:  FINDINGS: An enteric tube with sidehole beyond the level of the lower esophageal sphincter is seen looping on itself in the proximal stomach before terminating in the mid stomach. Status post right lower quadrant diverting ileostomy for an obstructing colonic mass.  Multiple loops of gas dilated small bowel remain, with a maximal diameter of 5.4 cm whereas previously measured 3.6 cm. Dilute contrast is seen within the lateral left abdominal dilated small bowel. Moderate stool burden and bowel gas opacifies the cecum, measuring 7.3 cm in diameter. No definite supine evidence of pneumoperitoneum. Lung bases: Limited evaluation of the lung bases demonstrates a cardiac silhouette within normal limits for size. A small bore central venous catheter is seen terminating in the right atrium. No focal consolidation or pleural effusion. 2/24/22 CT abd/pelvis [facility]:  IMPRESSION:  1. Status post right lower quadrant loop ileostomy. Mild diffuse dilation of small bowel proximal to the ostomy in the lower abdomen and upper pelvis concerning for at least mild to moderate partial bowel obstruction. Relatively decompressed loop ileostomy. 2. Mildly complex pelvic fluid collection in the rectovesical space, decreased in size from prior. 3. Redemonstrated ascending colon mass and findings suspicious for regional metastatic disease. 4. Redemonstrated soft tissue in the retroperitoneum with prominent lymph nodes, similar to prior examination. Differential would include metastasis, retroperitoneal fibrosis. 5. Mild atherosclerosis. 6. Subcentimeter right renal hypodensity, too small to characterize. 7. Severe compression of the left renal vein, similar to prior examination. 8. Additional findings as described above. Bones: No acute osseous abnormality. 2/24/22 CT chest [facility]:  IMPRESSION:  FINAL report. 1. No evidence of metastatic disease to the chest.  2. No enlarged lymph nodes. 3. Increasing volume loss in the lung bases which may be due to atelectasis. 4. CT abdomen and pelvis reported separately. 2/25/22 Colonoscopy at [facility]:  Impression:            - Preparation of the colon was inadequate. - Stool in the entire examined colon.                         - Likely malignant completely obstructing tumor at the                         hepatic flexure. Biopsied.                        - Malignant-appearing tumor in the colon. Complications:         No immediate complications. 7/19/22 CT ch/abd/pelv:  IMPRESSION  Response to treatment characterized by decrease in size of the apical core  lesion in the proximal transverse colon and decreased mucosal colic  lymphadenopathy. Persistent mesenteric lymphadenopathy and retroperitoneal/mesenteric soft tissue  which may relate to the patient's history of lymphoma. Chronic left renal atrophy with chronic left hydronephrosis. RECIST:  Target lesions:  Transverse colon mass, series 2, image 75, 27 x 26 mm, previously 49 x 45 mm. Nontarget lesions:  Transverse mesocolic lymph nodes, decreased. 10/10/22 CT ch/abd/pelv:  IMPRESSION  Increase in peritoneal disease compared to the prior examination. Stable  mesenteric infiltrate. Improvement in the mass lesion involving the transverse  colon. RECIST:  Target lesion:  Transverse colon mass, series 2, image 76, 1.7 x 1.3 cm, previously 2.7 x 2.6cm. Nontarget lesions:   Transverse mesial colic lymph nodes unchanged. 12/30/22 CT ch/abd/pelvis:  IMPRESSION  Progression of disease with increase in size of perihepatic  infiltrative disease, increased in size and number of abdominal metastases, and  new soft tissue metastases in the anterior abdominal wall. Soft tissue  infiltrating around the celiac axis, SMA, and renal arteries and veins is not  significant changed. Incidentals as above with no CT evidence of metastatic  disease to the thorax. 2/9/2023 CT A/P WO CONT  IMPRESSION  Small bowel obstruction with worsening of carcinomatosis. 2/10/23 XR ABD   IMPRESSION: Nasogastric tube appears to be in satisfactory position. 2/12/23 KUB: IMPRESSION   Unchanged small bowel obstruction. No visible enteric tube. Test results above have been reviewed.     Assessment/Recommendations: Undifferentiated carcinoma of transverse colon, metastatic:   SUZANNE, PDL1 1%, BRAF/NRAS negative, possibly KRAS wild-type  S/p diverting ileostomy due to large bowel obstruction. Colonoscopy with biopsy on 2/25/22. No obvious metastatic disease on CT imaging, but did have obvious metastatic disease to peritoneum during laparoscopy with Dr. Myrna Crum. Initially treated with  FOLFOXIRI every 2 weeks. CT after C9 showed good response with decrease in size of colon mass, but increased peritoneal implants. He received 2 cycles of second line therapy FOLFIRI + Panitumumab every 2 weeks. C3 FOLFIRI + Panitumumab delayed due to neutropenia. Unfortunately, he developed a small bowel obstruction and worsening carcinomatosis on recent imaging. Have reviewed with patient that SBO in the setting metastatic cancer, especially carcinomatosis, is quite difficult to treat. It is likely to be recurrent and is unlikely to respond to any treatments. Discussed that further therapy is unlikely to improve his disease. -- Pt is now ready for hospice - gave him number for [employer] that he meet with recently. -- He would like to pursue weekly IVF with hospice. -- Offered supportive listening and discussed goals of care. -- Follow up as needed. 2. Small bowel obstruction / Weight loss / Hyponatremia:    SBO is due to worsening carcinomatosis. Had required NGT in hospital, but spontaneously resolved with bowel rest and slow uptitration of liquids. Advised that he should avoid solid food to decrease exacerbation. Advised Boost/Ensure, jello, fluids. Weight loss and hyponatremia is related to drastic decrease in caloric intake. 3. Chronic lumbar back pain / anxiety / RLQ / acute right upper quadrant and umbilical pain:   Left lower back pain is chronic/stable. RLQ is likely related to neoplasm/colostomy/parastomal hernia. Evaluated by Dr. Myrna Crum who believes umbilical burning pain is due to tumor implants.    -- Follows  with Dr. Vieira senior living outpatient     4. H/o Follicular lymphoma:   Completed treatment 5/2019. End of treatment PET 6/3/19 showed CR. No extra surveillance needed at this time given metastatic colon cancer with frequent imaging. Current imaging shows slight increase in soft tissue density in deep pelvis on 10/2022. Will continue to monitor for colon cancer follow up imaging. 5. CAD / HTN:   h/o STEMI 2/14/19 with TOM placed to proximal LAD. Follows with Dr. Sirena Hernandez. On ASA, Lipitor. 6. Goals of care:   Disease is incurable and will be difficult to treat if pt has recurrent SBO. Patient understandably struggling with diagnosis and prognosis (4-6 weeks). Pt is now ready to move forward with hospice.      Signed By: Joan Levy MD

## 2023-03-07 NOTE — PROGRESS NOTES
1. Have you been to the ER, urgent care clinic since your last visit? Hospitalized since your last visit? No    2. Have you seen or consulted any other health care providers outside of the [address] [place] [address] since your last visit? Include any pap smears or colon screening.  No        Visit Vitals  BP (!) 128/93 (BP 1 Location: Left upper arm, BP Patient Position: Sitting, BP Cuff Size: Adult)   Pulse (!) 114   Temp 97.6 °F (36.4 °C) (Oral)   Resp 18   Ht 5' 7\" (1.702 m)   Wt 123 lb 3.2 oz (55.9 kg)   SpO2 97%   BMI 19.30 kg/m²            Chief Complaint   Patient presents with    Follow-up

## 2023-03-07 NOTE — TELEPHONE ENCOUNTER
Palliative Medicine  Nursing Note  [phone]  Fax [phone]      Telephone Call  Patient Name: Hayden Kwan. YOB: 1975    3/7/2023        Primary Decision Maker: LocoBernie Ritchie Ne - Spouse - [phone]   Advance Care Planning 2/13/2023   Patient's Healthcare Decision Maker is: Legal Next of Kin   Confirm Advance Directive None   Patient Would Like to Complete Advance Directive No   Does the patient have other document types Do Not Resuscitate     Per Dr. Chrissy Keller[facility] due to colon adenocarcinoma. Call placed to [facility] to inform of referral and verified that intake has received it.        Lizette Alvarez RN  Palliative Medicine

## 2023-03-07 NOTE — PROGRESS NOTES
[facility] Progress Note    Date: 2023    Name: Valencia Arthur. MRN: [id number]         : 1975    Mr. Adam Li Arrived ambulatory and in no distress for Hydration. Assessment was completed, no acute issues at this time, patient reports increasing weakness. Right chest wall port accessed without difficulty, labs drawn & sent for processing. Patient proceeded to appointment with Dr. Geni Caceres team.       Mr. Lila Rowe vitals were reviewed. Visit Vitals  BP (!) 145/91   Pulse (!) 117   Temp 97.6 °F (36.4 °C)   Resp 16   Ht 5' 7\" (1.702 m)   Wt 55.9 kg (123 lb 3.2 oz)   BMI 19.30 kg/m²       Lab results were obtained and reviewed. Recent Results (from the past 12 hour(s))   CBC WITH AUTOMATED DIFF    Collection Time: 23 10:28 AM   Result Value Ref Range    WBC 21.3 (H) 4.1 - 11.1 K/uL    RBC 3.86 (L) 4.10 - 5.70 M/uL    HGB 11.6 (L) 12.1 - 17.0 g/dL    HCT 35.6 (L) 36.6 - 50.3 %    MCV 92.2 80.0 - 99.0 FL    MCH 30.1 26.0 - 34.0 PG    MCHC 32.6 30.0 - 36.5 g/dL    RDW 18.0 (H) 11.5 - 14.5 %    PLATELET 414 316 - 591 K/uL    MPV 9.6 8.9 - 12.9 FL    NRBC 0.0 0  WBC    ABSOLUTE NRBC 0.00 0.00 - 0.01 K/uL    NEUTROPHILS 81 (H) 32 - 75 %    LYMPHOCYTES 10 (L) 12 - 49 %    MONOCYTES 8 5 - 13 %    EOSINOPHILS 0 0 - 7 %    BASOPHILS 0 0 - 1 %    IMMATURE GRANULOCYTES 1 (H) 0.0 - 0.5 %    ABS. NEUTROPHILS 17.3 (H) 1.8 - 8.0 K/UL    ABS. LYMPHOCYTES 2.0 0.8 - 3.5 K/UL    ABS. MONOCYTES 1.7 (H) 0.0 - 1.0 K/UL    ABS. EOSINOPHILS 0.0 0.0 - 0.4 K/UL    ABS. BASOPHILS 0.1 0.0 - 0.1 K/UL    ABS. IMM.  GRANS. 0.3 (H) 0.00 - 0.04 K/UL    DF AUTOMATED     METABOLIC PANEL, COMPREHENSIVE    Collection Time: 23 10:28 AM   Result Value Ref Range    Sodium 127 (L) 136 - 145 mmol/L    Potassium 4.4 3.5 - 5.1 mmol/L    Chloride 96 (L) 97 - 108 mmol/L    CO2 24 21 - 32 mmol/L    Anion gap 7 5 - 15 mmol/L    Glucose 112 (H) 65 - 100 mg/dL    BUN 20 6 - 20 MG/DL    Creatinine 0.87 0.70 - 1.30 MG/DL BUN/Creatinine ratio 23 (H) 12 - 20      eGFR >60 >60 ml/min/1.73m2    Calcium 8.8 8.5 - 10.1 MG/DL    Bilirubin, total 1.3 (H) 0.2 - 1.0 MG/DL    ALT (SGPT) 32 12 - 78 U/L    AST (SGOT) 43 (H) 15 - 37 U/L    Alk. phosphatase 717 (H) 45 - 117 U/L    Protein, total 6.2 (L) 6.4 - 8.2 g/dL    Albumin 1.8 (L) 3.5 - 5.0 g/dL    Globulin 4.4 (H) 2.0 - 4.0 g/dL    A-G Ratio 0.4 (L) 1.1 - 2.2         Medications:  Medications Administered       heparin (porcine) pf 500 Units       Admin Date  03/07/2023 Action  Given Dose  500 Units Route  InterCATHeter Administered By  [place], [place]              sodium chloride (NS) flush 5-40 mL       Admin Date  03/07/2023 Action  Given Dose  10 mL Route  IntraVENous Administered By  [place], [place]              sodium chloride 0.9 % bolus infusion 1,000 mL       Admin Date  03/07/2023 Action  New Bag Dose  500 mL Rate  1,000 mL/hr Route  IntraVENous Administered By  [facility] [place]                     Mr. Savanna Sandoval tolerated treatment , he did again have chills and shaking at the end of infusion which subsided after 15 minutes and was discharged from [place] [address] in stable condition at 1210. Port de-accessed, flushed & heparinized per protocol. He is to return on March 14 at 1030 for his next appointment.     [facility]  March 7, 2023

## 2023-03-08 ENCOUNTER — APPOINTMENT (OUTPATIENT)
Dept: INFUSION THERAPY | Age: 48
End: 2023-03-08

## 2023-03-08 ENCOUNTER — HOME CARE VISIT (OUTPATIENT)
Dept: HOSPICE | Facility: HOSPICE | Age: 48
End: 2023-03-08
Payer: MEDICAID

## 2023-03-08 ENCOUNTER — HOME CARE VISIT (OUTPATIENT)
Dept: SCHEDULING | Facility: HOME HEALTH | Age: 48
End: 2023-03-08
Payer: MEDICAID

## 2023-03-08 VITALS
SYSTOLIC BLOOD PRESSURE: 124 MMHG | RESPIRATION RATE: 16 BRPM | OXYGEN SATURATION: 99 % | DIASTOLIC BLOOD PRESSURE: 90 MMHG | HEART RATE: 84 BPM | BODY MASS INDEX: 19.3 KG/M2 | WEIGHT: 123 LBS | HEIGHT: 67 IN

## 2023-03-08 PROCEDURE — G0299 HHS/HOSPICE OF RN EA 15 MIN: HCPCS

## 2023-03-08 NOTE — HOSPICE
Rao Rangel .  79  47                                                       Principle Hospice Diagnosis: colon CA with mets to liver and peritoneum  Diagnoses RELATED to the terminal prognosis: neoplastic pain, ileostomy, weight loss   Other Diagnoses: CAD with stents, Past MI and cardiac arrest, Hx follicular lymphoma     Date of Hospice Admission: 3-8-23  Hospice Attending Elected by Patient:   Primary Care Physician: Humera Magana, onc-Dr. Simran Garcia, Dr. Hernandez Frank referring     Admitting RN: ZULAY  Nurse CM: Sandrita Dejesus  : Carley Duranue:    Rd Pope DNR:  Yes   signed on admission, take a copy to the home    Home:    Direct Observation: Visit with Ivone Rios at home with his wife Christina Mayes and 3 y.o. son. Family had a prior visit for information from our team, so after review of goals of care and hospice benefit, pt signed his own consents. Spouse Christina Mayes relates pt has had a rapid decline in the last week. Pt able to walk to living room from bedroom. Frail, cachectic appearing with large distended abdomen. He appears exhausted by is alert and able to participate in the discussion. PPS 40. Relates his pain to be \"8\" on long- acting opioid with breakthrough meds. Offered titration of medication, but Ivone Rios declines for now because he is fearful of being more sedated. He is SOB with exertion and in certain positions. Poor po intake mostly just sips of fluid. Meds deprescribed other than comfort meds. Occasional vomiting and he is using Zofran and prochlorperazine as needed. Ileostomy with soft brown drainage and pt manages his own appliance with help from spouse. Voiding independently. NO open skin areas of concern. Port in place to right chest. Other- discussed plan of care. Reviewed possibility pt will need to convert to liquid meds if swallowing is an issue. Reviewed availability of [facility] for intense symptoms or respite but pt and spouse state he would want to be home.  Refilled opioids as pt has only a few days left. Reviewed meds with Dr. Laurent Back and will deliver [facility] tomorrow with liquid opioid and other comfort meds. DME for delivery tomorrow. Pt and spouse will call with any needs prior to first RN visit. ER Visits/ Hospitalizations in past year: 2 admissions in February  Onset Date of Hospice Diagnosis: 3-8-23  Summary of Disease Progression Leading to Hospice Diagnosis: from consult note of Maurisio Jo NP on 2-10-23:  Darryl Manzano. is a 41y.o. year old with high-grade follicular lymphoma, who was referred to Palliative Medicine by Dr. Samuel Salcido for symptom management and supportive care. He was diagnosed in 11/2018 after presenting with back pain, treated with systemic chemotherapy with complete response. He suffered cardiac arrest during cycle #3 due to previously undiagnosed severe stenosis of the LAD s/p PTCA and stent. He was diagnosed with poorly differentiated carcinoma of the colon (no evidence of metastatic disease) in 2/14/22 and underwent loop ileostomy at [address] [place] [address] on 2/19/22. He underwent exploratory laparoscopy in 4/2022 which revealed a large tumor at the hepatic flexure peritoneal carcinomatosis. Scans 12/2022 revealed disease progression. He is currently being treated with systemic chemotherapy under the care of Dr. Jorge L Johnson with the goal of palliation of symptoms and prolongation of life. He was admitted 2/10/23 from home with a diagnosis of SBO, ARF, nausea, abdominal and back pain. General surgery consulted. Seen in our clinic by Dr. Karina Tiwari on 2/6/23. Cancer Diagnoses     ________  A. Disease with distant metastases at presentation OR    ____x____  B.  Progression from an earlier stage of disease to metastatic disease with either:                          ___x_____  1. continued decline in spite of therapy                          ____x____  2. patient declines further disease directed therapy      Psych/ Social/ Emotional Issues Identified: Amos Wilson and his wife Alexandru Wei have a young son with special needs. Both have older children from prior relationships. Alexandru Wei would benefit from volunteer help. Dr. Denise Sharp contacted, agrees to serve as attending provider for hospice and provided verbal certification of terminal illness with prognosis of 6 months or less life expectancy. Orders for hospice admission, medications and plan of treatment received. Medication reconciliation completed.     Currently this patient has:  PORT:     right chest      Ostomy:    ileostomy       MEDS:  I have reviewed the patient's medication list with MD and identified the following:  Nonformulary medications: none  Unrelated medications: none    IDT communication to include MD, SN, SW, [address] [place] [address] and support team.

## 2023-03-09 ENCOUNTER — HOME CARE VISIT (OUTPATIENT)
Dept: SCHEDULING | Facility: HOME HEALTH | Age: 48
End: 2023-03-09
Payer: MEDICAID

## 2023-03-09 VITALS
RESPIRATION RATE: 20 BRPM | DIASTOLIC BLOOD PRESSURE: 71 MMHG | SYSTOLIC BLOOD PRESSURE: 115 MMHG | WEIGHT: 123 LBS | HEART RATE: 118 BPM | OXYGEN SATURATION: 98 % | BODY MASS INDEX: 19.3 KG/M2 | HEIGHT: 67 IN

## 2023-03-09 PROCEDURE — G0299 HHS/HOSPICE OF RN EA 15 MIN: HCPCS

## 2023-03-09 NOTE — TELEPHONE ENCOUNTER
Attempted to contact patient regarding a referral we received on their behalf.  LVM instructing patient to call the office back to schedule an appointment [As Noted in HPI] : as noted in HPI [Negative] : Respiratory

## 2023-03-13 NOTE — HOSPICE
Visit [facility] NP to assess pain and symptom management. Juan Sweeney is anxious and appearing overwhelmed. SHe initially was angry that this writer and the NP Addy Camacho had arrived for our visit. This was arranged at last visit and voice mail reminder message left this morning but she had not returned the message. She became apologetic and describes other situations going on besides the immediate situation with her severly ill . Understanding expressed but she felt it necessary to apologize multiple times during the visit. She is busy taking care of the child and trying to hear the conversation and contribute as much as she can and at the same time she is emotional and near tears at the thought of her  dying. NP Addy Camacho discussed anticipatory grief and a keepsake that can be made with a fingerprint that both pt and Juan Sweeney really were interested in.  [facility] sent message to reach out. Decline HHA at this time. Palpable tumors in abdomen. Ileostomy appliance intact with liquid brown drainage in bag. New onset of acid reflux adding to pts nausea with order received for omeprazole. Pt continues to alternate zofran and compazine so he is taking medication for nausea approximately every 4 hours. Pt had a near fall earlier this day. He caught himself and sat on the couch but it scared him. Hospital bed in living room. Spouse states they have a wheelchair also. Pt will sleep in regular bed for now until he is no longer able to get up. Spouse states the 3year old sleeps with them and she is trying to protect him from memories of the illness and death. Both pt and spouse still state they want to be at home for final days. Meds:  Oxycontin ER 20 mg every 8 hours increased this day to 40mg every 8 hours  Oxycodone 20 mg every 4 hours, or 10 mg every 2 depending on time of day. New order received for xanax to increase to 1mg every 6 hours as needed  New order for omeprazole 40mg daily.    Plan accepted for nursing visit again on thursday.

## 2023-03-16 NOTE — HOSPICE
is visiting for an initial pastoral visit. Carrillo Arias was seated on the couch at the time. Carrillo Peer shared about his life and processed his decline. He talked through making peace with his dying and his belief that he will be met by family when he dies. He is concerned about and feels grief about not being able to be present for his son's life. Jerson Russo raised spiritual questions about michelle and suffering, while noting she holds michelle in God and finds hope in her michelle.  provided a facilitative presence, engaged pt and family in dialogue about his decline, and prayer.

## 2023-03-17 NOTE — HOSPICE
Estefanía Rivera pts spouse greets this writer at [employer]. She led me into the bedroom where pt is resting and 3year old Axle is napping. Estefanía Rivera continues to be apologizing with the disarray of the home and is constantly reassured her home is fine and understanding expressed she is doing a great job managing everything. She went into a different part of the home and was speakng with someone who turns out is pts brother. Pt moved into the living room and states he has not seen his brother in years /decades. He was surprised that he made this visit but is glad about it too. Pt appearing to be declining quickly. He admits many visitors have left him fatigued but also struggling with symptoms. He has not yet started new pain regimen but will start today. Open discussion occured with pt and spouse about dying. Spouse is tearful and supported in her emotion and encouraged to grieve. She is trying her best to be strong for all of them right now. Pt expressed he feels like he is dying to his spouse last night and supported today with understanding and compassion. His michelle gives him strength and I reassured him Estefanía Rivera and Rey Kumar will be supported by [id number] with bereaement. Pt continues to struggle with nausea. No emesis only spits saliva. He started omeprazole and has had 2 doses but does not feel much effect. Encouraged to continue. Has more frequent hiccups. Continues to alternate zofran and compazine. Contacted NP John Sage and discussed. She suggests haldol from [facility] trial for symptoms. Reviewed and instructed on dosing. Pt appearing hesitant to use. Appears to be fighting to stay conscious for as long as possible. Near fall again today. Pt with increased weakness and understanding he will not be ambulatory for much longer. He states he will sleep in hsopital bed perhaps tonight. Spouse does not want him to die in the bed they all share presently.   Pt has not yet started new regimen of pain medication that was ordered by NP at visit 48 hours ago. He is still having same level of pain and using 20mg of oxycodone IR every 4 hours. Reviewed new dose of Oxycontin ER and pt states he will start it today. Also reviewed he should need less oxyIR with the higher dose. Spouse gave him a dose of dilaudid 2 nights ago. Suggested they reserve the dilaudid liquid for when the tablets can no longer be taken. Understanding verbalized. Will plan for call Saturday to follow up.

## 2023-03-19 NOTE — HOSPICE
MSW called patient's wife to introduce self and schedule initial visit. Patient answered phone, declined visit this week or weekend, requests visit on Tuesday. Patient shared that today is not a great day and that they have a lot of support and people visiting. MSW to call on Tuesday to confirm time before coming out for visit. MSW encouraged patient to call hospice if in need of support or there are any concerns prior to MSW visit.

## 2023-03-19 NOTE — HOSPICE
TOD 1228. Large family present. Family grieving appropriately. Medications wasted per protocol. Post mortem care provided. [facility]  home contacted. Dr. Cuca Will notified.

## 2023-03-21 ENCOUNTER — HOME CARE VISIT (OUTPATIENT)
Dept: HOSPICE | Facility: HOSPICE | Age: 48
End: 2023-03-21
Payer: MEDICAID

## 2023-03-21 ENCOUNTER — APPOINTMENT (OUTPATIENT)
Dept: INFUSION THERAPY | Age: 48
End: 2023-03-21

## 2023-03-27 ENCOUNTER — HOME CARE VISIT (OUTPATIENT)
Dept: HOSPICE | Facility: HOSPICE | Age: 48
End: 2023-03-27
Payer: MEDICAID

## 2023-03-28 ENCOUNTER — HOME CARE VISIT (OUTPATIENT)
Dept: HOSPICE | Facility: HOSPICE | Age: 48
End: 2023-03-28
Payer: MEDICAID

## 2023-03-28 ENCOUNTER — APPOINTMENT (OUTPATIENT)
Dept: INFUSION THERAPY | Age: 48
End: 2023-03-28

## 2023-04-04 ENCOUNTER — APPOINTMENT (OUTPATIENT)
Dept: INFUSION THERAPY | Age: 48
End: 2023-04-04

## 2023-04-14 ENCOUNTER — HOME CARE VISIT (OUTPATIENT)
Dept: HOSPICE | Facility: HOSPICE | Age: 48
End: 2023-04-14
Payer: MEDICAID

## 2023-04-18 ENCOUNTER — APPOINTMENT (OUTPATIENT)
Dept: INFUSION THERAPY | Age: 48
End: 2023-04-18

## 2023-04-25 ENCOUNTER — APPOINTMENT (OUTPATIENT)
Dept: INFUSION THERAPY | Age: 48
End: 2023-04-25

## 2023-05-02 ENCOUNTER — APPOINTMENT (OUTPATIENT)
Dept: INFUSION THERAPY | Age: 48
End: 2023-05-02

## 2023-05-09 ENCOUNTER — APPOINTMENT (OUTPATIENT)
Dept: INFUSION THERAPY | Age: 48
End: 2023-05-09

## 2023-05-16 ENCOUNTER — APPOINTMENT (OUTPATIENT)
Dept: INFUSION THERAPY | Age: 48
End: 2023-05-16

## 2023-05-23 ENCOUNTER — APPOINTMENT (OUTPATIENT)
Dept: INFUSION THERAPY | Age: 48
End: 2023-05-23

## 2023-08-26 NOTE — HOSPICE
On arrival greeted by pt.  3year old son Elmer is moving around the living room and Laura Hernandez joined. Reviewed hospice plan and expressed apology that pt is so sick. Discussed pain. Pt currently taking OxyContin 20 mg every 8 hours  He is using oxycodone 20 mg every 4 hours and sometimes takes only 10 mg. He rates his pain at 7/10 and states he took oxycodone about an hour ago and that he feels his pain is acceptable. He declines offer of increase today but is understanding if he needs to increase this can be done at any time. He reports anxiety and requests xanax and order received for xanax to be delivered tomorrow with supply of Zofran. [employer] attempted to deliver medication ordered yesterday and left a note, pt called and got into automated system. [employer] usually attempts to redeliver. Pt has enough for a few days. Laura Hernandez ran a quick errand while I was there. House has many toys and plan is to clear a space in living room for hospital bed and delivery deferred until tomorrow. Pt and spouse informed this writer will visit again Monday with NP and strongly encouraged to call with any needs or concerns. Outside the home Laura Hernandez asked how long I thought he had and based on experience I suggested perhaps a few weeks. She is scared and not knowing what to expect and how she will handle. We talked briefly about EOL and hospice support and I encouraged her to call with any needs. DNR delivered today and placed on refrigerator. Both pt and Laura Hernandez do not have alot of family /friend support. [id number]

## 2023-09-06 NOTE — TELEPHONE ENCOUNTER
Call Center TCM Note      Flowsheet Row Responses   [facility] facility patient discharged from? [place]   Does the patient have one of the following disease processes/diagnoses(primary or secondary)? Sepsis   TCM attempt successful? No  [verbal release ]   Unsuccessful attempts Attempt 1   Revoked Reason Other            Arlene Schmitz, RN    2023, 13:25 EDT         Call made to Mr Herminio Quezada left V/M Dr Sierra Montez sent Rx to the pharmacy.

## 2023-09-19 NOTE — PROGRESS NOTES
[facility] Progress Note    Date: 2019    Name: Diana Reyna. MRN: [id number]         : 1975    1035. Mr. Craig Last Arrived ambulatory and in no distress for Labs. Assessment was completed, no acute issues at this time, no new complaints voiced. R chest wall port accessed without difficulty, labs drawn and in process. Mr. Randa Leach vitals were reviewed. Patient Vitals for the past 12 hrs:   Temp Pulse Resp BP   19 1039 96.6 °F (35.9 °C) 65 18 122/79       Labs pending, please see [facility] for results. 1045. Mr. Craig Last tolerated treatment well and was discharged from [place] [address] in stable condition. Port de-accessed, flushed & heparinized per protocol. He is to return on 19 for his next appointment.     Daisy Wei RN  2019
Yes

## 2023-09-22 NOTE — PROGRESS NOTES
Attempted to call patient via home/cell number listed. No answer, left message requesting that patient return call regarding his lab results. Provided office phone number as well for patient to call back. Need to inquire if patient is having any loose stools or changes in his eating. Also, would like to verify if patient is taking his Lisinopril, as this can actually increase potassium levels. If patient is taking Lisinopril, not having diarrhea, and taking potassium 40 mEQ daily, patient should eat more potassium rich foods such as bananas, potatoes, tomatoes, and sweet potatoes (per Dr. Alicia Arellano). Left message for patient to schedule a 3-4 week follow up on the covid clinic.  If help is needed scheduling, please call [phone].

## 2023-10-07 NOTE — TELEPHONE ENCOUNTER
Received call from patient requesting Oxycodone 10 mg - 23 pills remaining. Oxycontin 20mg - 9 pills remaining. To be called in to [facility] pharmacy.
Triage for Controlled Substance Refill Request     Pain Diagnosis: _Colon adenocarcinoma ([facility] [address] [address] (C18.9); Abdominal pain, generalized (R10.84); Peritoneal carcinomatosis ([facility] [address] (C78.6)     Last Outpatient Visit: _11/28/2022     Next Outpatient Visit: _12/27/2022     Reason for refill needed outside of office visit? Appointment not scheduled prior to need for scheduled refill        Pharmacy: _[facility]/pharmacy #[id number] [place] [address]      Medication:oxyCODONE IR (ROXICODONE) 10 mg tab immediate release tablet     Dose and directions: Take 1 Tablet by mouth every four (4) hours as needed for Pain for up to 15 days. Number dispensed:90  Date filled ( or Pharmacy) 12/19/2022  # left:23      Medication: oxyCODONE ER (OxyCONTIN) 20 mg    Dose and directions: Take 1 Tablet by mouth every twelve (12) hours for 30 days.   Number dispensed:30  Date filled ( or Pharmacy) 11/28/2022  # left:9           reviewed: _yes     Date of Urine Drug Screen:  _8/22/2022     Opioid Safety Handout given:  _yes     Appropriate for refill:  _yes     Action:  _ Medication pending
91

## 2024-03-15 NOTE — PROGRESS NOTES
Oncology Progress Note     Admit Date: 11/10/2018    CC:  F/U of retroperitoneal mass and adenopathy    Interval History:   Pain controlled with current regimen. Nausea this am.    Current Facility-Administered Medications   Medication Dose Route Frequency    sodium chloride (NS) flush 5-10 mL  5-10 mL IntraVENous Q8H    sodium chloride (NS) flush 5-10 mL  5-10 mL IntraVENous PRN    acetaminophen (TYLENOL) tablet 650 mg  650 mg Oral Q4H PRN    HYDROmorphone (DILAUDID) injection 1 mg  1 mg IntraVENous Q4H PRN    ondansetron (ZOFRAN) injection 4 mg  4 mg IntraVENous Q4H PRN    enoxaparin (LOVENOX) injection 40 mg  40 mg SubCUTAneous Q24H    oxyCODONE IR (ROXICODONE) tablet 5 mg  5 mg Oral Q4H PRN    senna-docusate (PERICOLACE) 8.6-50 mg per tablet 1 Tab  1 Tab Oral DAILY    nicotine (NICODERM CQ) 14 mg/24 hr patch 1 Patch  1 Patch TransDERmal DAILY    atorvastatin (LIPITOR) tablet 10 mg  10 mg Oral DAILY    hydroCHLOROthiazide (HYDRODIURIL) tablet 12.5 mg  12.5 mg Oral DAILY    lisinopril (PRINIVIL, ZESTRIL) tablet 10 mg  10 mg Oral DAILY    0.9% sodium chloride infusion  75 mL/hr IntraVENous CONTINUOUS        Review of Systems:      Objective:     Patient Vitals for the past 8 hrs:   BP Temp Pulse Resp SpO2   18 0811 154/89 98.7 °F (37.1 °C) 71 14 98 %       Temp (24hrs), Av °F (36.7 °C), Min:97.5 °F (36.4 °C), Max:98.7 °F (37.1 °C)      No intake/output data recorded.     Physical Exam:  Alert  Anicteric sclera  Lungs: aerating well  CV: regular  Extremities: no edema    Labs:    Recent Results (from the past 24 hour(s))   LD    Collection Time: 18  1:50 AM   Result Value Ref Range     85 - 241 U/L   CBC W/O DIFF    Collection Time: 18  1:50 AM   Result Value Ref Range    WBC 6.9 4.1 - 11.1 K/uL    RBC 3.97 (L) 4.10 - 5.70 M/uL    HGB 12.2 12.1 - 17.0 g/dL    HCT 37.1 36.6 - 50.3 %    MCV 93.5 80.0 - 99.0 FL    MCH 30.7 26.0 - 34.0 PG    MCHC 32.9 30.0 - 36.5 g/dL    RDW 14.6 RN notified Dr. Hawely of patient's blood pressure post procedure. Orders for 5mg of IV hydralazine ordered. RN will continue to monitor.    (H) 11.5 - 14.5 %    PLATELET 619 306 - 728 K/uL    MPV 10.8 8.9 - 12.9 FL    NRBC 0.0 0  WBC    ABSOLUTE NRBC 0.00 0.00 - 1.23 K/uL   METABOLIC PANEL, COMPREHENSIVE    Collection Time: 11/11/18  1:50 AM   Result Value Ref Range    Sodium 143 136 - 145 mmol/L    Potassium 4.0 3.5 - 5.1 mmol/L    Chloride 110 (H) 97 - 108 mmol/L    CO2 24 21 - 32 mmol/L    Anion gap 9 5 - 15 mmol/L    Glucose 97 65 - 100 mg/dL    BUN 11 6 - 20 MG/DL    Creatinine 0.91 0.70 - 1.30 MG/DL    BUN/Creatinine ratio 12 12 - 20      GFR est AA >60 >60 ml/min/1.73m2    GFR est non-AA >60 >60 ml/min/1.73m2    Calcium 8.5 8.5 - 10.1 MG/DL    Bilirubin, total 0.2 0.2 - 1.0 MG/DL    ALT (SGPT) 12 12 - 78 U/L    AST (SGOT) 7 (L) 15 - 37 U/L    Alk. phosphatase 81 45 - 117 U/L    Protein, total 6.1 (L) 6.4 - 8.2 g/dL    Albumin 2.7 (L) 3.5 - 5.0 g/dL    Globulin 3.4 2.0 - 4.0 g/dL    A-G Ratio 0.8 (L) 1.1 - 2.2         Assessment/Plan:     1. Retroperitoneal mass/lymphadenopathy. -CT chest today       -Will need tissue confirmation. Likely excisional inguinal node biopsy. One of my partners will f/u tomorrow.

## 2024-05-21 NOTE — CONSULTS
[facility] [place] [facility] [place] [address] [facility]: [phone]  F: [phone]      Reason for Visit:   Omar Griffin is a 41 y.o. male who is seen in consultation at the request of Dr. Olivia Smyth for evaluation of dental abscess    Hematology / Oncology Treatment History:     Diagnosis: Follicular lymphoma     Stage: IV     Pathology:   11/13/18 right inguinal LN excision: Follicular lymphoma, high-grade (grade 3a of 3). Comment   The delaney architecture is entirely effaced by atypical lymphocytic proliferation with prominent nodular growth pattern. The majority of the atypical lymphocytes are small to medium in size and have irregular nuclear outlines and inconspicuous nucleoli, morphologically consistent with centrocytes. Scattered large lymphocytes with prominent nucleoli, consistent with centroblasts are identified (> 15 per high-power field). Focal increase in mitotic figures is noted. Occasional follicular dendritic cells are seen. By immunohistochemistry, the atypical lymphocytes are positive for CD20, PAX5, CD10, BCL6 and BCL2 (weak, focal) and negative for MUM1. CD3, CD5 and CD43 stain numerous background T lymphocytes. Ki-67 reveals a high proliferation index in the neoplastic follicles (overall 80-34%). Clinical history indicates 14 cm retroperitoneal mass with bilateral inguinal lymphadenopathy. In summary, the combined morphologic and phenotypic findings are diagnostic of a high-grade follicular lymphoma (grade 3a of 3). There is no evidence of diffuse large B-cell lymphoma. Flow cytometry analysis:   Monoclonal B-cell population (47 % of all cells) with mild increase in side and forward scatter properties expressing CD19, CD20, CD23 and CD10 with surface lambda light chain restriction. No phenotypically aberrant T-cell population.    Flow cytometry was performed at [facility]      Prior Treatment: None     Current Treatment: Obinutuzumab-CHOP. Obinutuzumab: 1000 mg weekly on days 1, 8, 15 for cycle 1, then 1000 mg on day 1 q21 days for cycles 2-6, then monotherapy 1000 mg every 21 days for cycle 7, 8 with Cyclophosphamide 750mg/m2, Doxorubicin 50mg/m2, Vincristine   1.4mg/m2 on day 1 and Prednisone 100mg on Days 1-5, every 21 days for a total of 6 cycles. Oncologic History:  He states he was in his usual state of health in early November 2018, but then he developed low back pain and presented to the ER. The pain was described as constant, radiating to the buttocks, without exacerbating or alleviating factors, associated w/ increased urination, no n/v/d, no dysuria, no fever, he's unsure about weight loss. Work-up at outside hospital revealed a retroperitoneal mass seen on CT imaging, and he was transferred to [facility] for further work-up. CT there showed a large retroperitoneal mass encircling the aorta with invasion of the left renal hilum and left adrenal gland.  There were bilateral inguinal lymph nodes and moderate left hydronephrosis. He was evaluated while at [facility] and was noted to have palpable nodes in his groin for approximately the past 1 month. No testicular mass, anorexia, weight loss, fevers, night sweats, chest pain, shortness of breath, abdominal pain or nausea. He underwent excisional LN biopsy of right inguinal LN. He was told that he had likely lymphoma. He was advised to follow up as outpatient for PET scan, bone marrow biopsy. However, patient states he was lost to f/u. He never received any calls or appointment details. His aunt urged patient to seek care elsewhere and his PCP recommended [facility]. At our first meeting on 12/13/18, he tells me that he was working full time, feeling well until early Nov 2018. When he developed the left lower back pain, he went to [facility] and [facility]. No fevers, chills, night sweats. He has lost 15 lbs in the past month due to low appetite. No n/v/d.  No current constipation. No CP, SOB. No h/o cardiac disease aside from HTN, Hyperlipiemia. No hematochezia/melena, hematuria. He has HTN and XOL, which he was taking meds for, but has run out. Has no PCP currently. He is uninsured. He works as a cook, currently working part time. He has children aged 12 and 13. History of Present Illness:   Mr. Brandi Guerra is a 40 y/o male with HTN with Follicular lymphoma on chemotherapy treatment, referred to ED for neutropenic fever. On 1/9/19, he was seen in our office after complaining of mouth and dental pain. He was noted to have very poor dentition, erythema of gums and swelling of left jaw, right cheek. He was sent to our infusion room for IVF, blood cultures and a dose of Zosyn vs Cefepime. During the IVFs, patient developed fever to 102 and was referred to ED for admission. Labs notable for [address] of 0. He was started on Vanc and Zosyn. Maxillofacial CT is negative for abscess. He has pain in lower left jaw and progressing to rt upper jaw; described as throbbing and sharp. Taking tylenol at home. Remains in ED pending bed placement. He reports feeling slightly better after starting on IV antibiotics. Has had problems with teeth in the past.  No family at bedside. Today, patient states his pain is improving, but not yet resolved. He is on a liquid diet. Last fever was to 100.7 at 12:25 on 1/10/19. No other problems. Past Medical History:   Diagnosis Date    Anxiety     Hyperlipidemia     Hypertension     Lymphadenopathy 11/12/2018      History reviewed. No pertinent surgical history.    Social History     Tobacco Use    Smoking status: Current Every Day Smoker     Packs/day: 1.00     Years: 20.00     Pack years: 20.00    Smokeless tobacco: Never Used   Substance Use Topics    Alcohol use: No     Frequency: Never      Family History   Problem Relation Age of Onset    Hypertension Father     Diabetes Mother      Current Facility-Administered Medications   Medication Dose Route Frequency    oxyCODONE IR (ROXICODONE) tablet 5 mg  5 mg Oral Q4H PRN    potassium, sodium phosphates (NEUTRA-PHOS) packet 2 Packet  2 Packet Oral Q4H    Vancomycin Trough - draw at 2330 on 1/11 (draw 30 min prior to 0000 dose on 1/12)   Other ONCE    tbo-filgrastim (GRANIX) injection 300 mcg  300 mcg SubCUTAneous DAILY    piperacillin-tazobactam (ZOSYN) 4.5 g in 0.9% sodium chloride (MBP/ADV) 100 mL  4.5 g IntraVENous Q6H    sodium chloride (NS) flush 5-40 mL  5-40 mL IntraVENous Q8H    sodium chloride (NS) flush 5-40 mL  5-40 mL IntraVENous PRN    acetaminophen (TYLENOL) tablet 650 mg  650 mg Oral Q6H PRN    naloxone (NARCAN) injection 0.4 mg  0.4 mg IntraVENous PRN    0.9% sodium chloride infusion  100 mL/hr IntraVENous CONTINUOUS    nicotine (NICODERM CQ) 21 mg/24 hr patch 1 Patch  1 Patch TransDERmal Q24H    lactobac ac& pc-s.therm-b.anim (AUSTIN Q/RISAQUAD)  1 Cap Oral DAILY    LORazepam (ATIVAN) tablet 0.5 mg  0.5 mg Oral Q8H PRN    senna-docusate (PERICOLACE) 8.6-50 mg per tablet 1 Tab  1 Tab Oral DAILY    heparin (porcine) injection 5,000 Units  5,000 Units SubCUTAneous Q8H    atorvastatin (LIPITOR) tablet 10 mg  10 mg Oral QHS      No Known Allergies     Review of Systems: A complete review of systems was obtained, negative except as described above. Physical Exam:     Visit Vitals  /82 (BP 1 Location: Right arm, BP Patient Position: At rest)   Pulse 93   Temp 98.2 °F (36.8 °C)   Resp 18   Ht 5' 6\" (1.676 m)   Wt 145 lb (65.8 kg)   SpO2 (!) 87%   BMI 23.40 kg/m²     ECOG PS: 2  General: No distress  Eyes: PERRLA, anicteric sclerae  HENT: poor dentition noted/ missing teeth/  Atraumatic with normal appearance of ears and nose; Some facial swelling noted at left lower jaw and right mid cheek.   Neck: Supple; no thyromegaly   Lymphatic: Rt cervical node; no supraclavicular, or axillary adenopathy  Respiratory: CTAB, normal respiratory effort  CV: Normal rate, regular rhythm, no murmurs, no peripheral edema  GI: Soft, nontender, nondistended, no masses, no hepatomegaly, no splenomegaly  MS: Digits without clubbing or cyanosis. Skin: No rashes, ecchymoses, or petechiae. Normal temperature, turgor, and texture. Neuro/Psych: Alert, oriented, appropriate affect, normal judgment/insight      Results:     Lab Results   Component Value Date/Time    WBC 2.2 (L) 01/11/2019 03:27 AM    HGB 9.2 (L) 01/11/2019 03:27 AM    HCT 27.2 (L) 01/11/2019 03:27 AM    PLATELET 362 30/28/0898 03:27 AM    MCV 89.2 01/11/2019 03:27 AM    ABS. NEUTROPHILS 0.4 (L) 01/11/2019 03:27 AM    Hemoglobin (POC) 15.0 06/05/2009 02:13 PM    Hematocrit (POC) 44 06/05/2009 02:13 PM     Lab Results   Component Value Date/Time    Sodium 140 01/11/2019 03:27 AM    Potassium 3.3 (L) 01/11/2019 03:27 AM    Chloride 105 01/11/2019 03:27 AM    CO2 23 01/11/2019 03:27 AM    Glucose 98 01/11/2019 03:27 AM    BUN 3 (L) 01/11/2019 03:27 AM    Creatinine 0.94 01/11/2019 03:27 AM    GFR est AA >60 01/11/2019 03:27 AM    GFR est non-AA >60 01/11/2019 03:27 AM    Calcium 8.2 (L) 01/11/2019 03:27 AM    Sodium (POC) 139 06/05/2009 02:13 PM    Potassium (POC) 3.9 06/05/2009 02:13 PM    Chloride (POC) 103 06/05/2009 02:13 PM    Glucose (POC) 98 06/05/2009 02:13 PM    BUN (POC) 9 06/05/2009 02:13 PM    Creatinine (POC) 1.0 06/05/2009 02:13 PM    Calcium, ionized (POC) 1.21 06/05/2009 02:13 PM     Lab Results   Component Value Date/Time    Bilirubin, total 0.3 01/11/2019 03:27 AM    ALT (SGPT) 30 01/11/2019 03:27 AM    AST (SGOT) 12 (L) 01/11/2019 03:27 AM    Alk. phosphatase 70 01/11/2019 03:27 AM    Protein, total 5.4 (L) 01/11/2019 03:27 AM    Albumin 2.0 (L) 01/11/2019 03:27 AM    Globulin 3.4 01/11/2019 03:27 AM       Lab Results   Component Value Date/Time     12/28/2018 10:00 AM    Beta-2 Microglobulin, serum 2.5 (H) 12/28/2018 10:00 AM    TSH 1.53 09/28/2016 04:40 AM    Lipase 198 11/09/2018 09:49 PM    Hep C  virus Ab Interp. NONREACTIVE 12/27/2018 04:53 PM     Lab Results   Component Value Date/Time    INR 1.0 11/09/2018 09:49 PM    aPTT 26.1 07/30/2018 07:46 AM     Lab Results   Component Value Date/Time     12/28/2018 10:00 AM    HCG, beta, QT <1 11/10/2018 03:41 AM    AFP, Serum, Tumor Marker 4.1 11/10/2018 03:41 AM     1/09/2019 XR CHEST  IMPRESSION:  1. No radiographic evidence of acute cardiopulmonary disease. 1/09/2019 CT MAXILLOFACIAL WO CONT  IMPRESSION:   1. Extensive periodontal disease and dental caries of the maxillary and  mandibular teeth. Ill-defined soft tissue measuring 2.2 x 1.1 cm in the right  buccal gingiva adjacent to periapical lucency of the root of tooth number 27,  with associated erosion of the buccal cortex of the mandible. This is favored to  represent phlegmon, with no definite organized fluid collection identified,  although evaluation limited by lack of IV contrast. There is overlying extensive  cellulitis of the premandibular and submandibular soft tissues, right worse than  left, as well as enlarged bilateral reactive level 1B lymph nodes, largest on  the right measuring 12 mm. Assessment and Recommendations:   42 yo male currently undergoing tx for follicualr lymphoma admitted with dental caries and neutropenic fever    1. Follicular lymphoma: Grade 3a. Although grade 3a disease is considered more indolent and can be treated like grade 1/2 disease, this patient has indications for treatment: bulky disease encircling the aorta causing symptoms. Bone marrow negative for lymphoma, but was hypercellular. BR better than RCHOP, but based on GALLIUM study, Obinutuzumab-based induction and maintenance prolongs PFS over that seen with rituximab-based therapy. Current tx with  O-CHOP regimen for 6 cycles, followed by possible maintenance Obinutuzumab.  Received  Cycle 1,  Day 8 of O-CHOP regimen on 1/03/2019.   -- Holding C1D15 treatment today given neutropenic fever and sepsis  -- Pt to f/u in No outpatient HemOnc clinic on 1/17/19 for labs, MD, treatment. -- Will need Neulasta (on-body) with cycle 2 and on. 2. Neutropenic fever: 2/2 dental infection. No abscess. Blood cultures negative. Appreciate ID and ENT evaluation. -- Continue on Zosyn. Vancomycin d/c'd given negative cultures  -- Continue Granix daily until ANC >= 0.5; this was started 1/10  -- If ANC continue to improve and is > 0.5 tomorrow, patient can likely be discharged home on Augmentin 875mg bid. -- Daily CBC with diff, BMP.  -- Will need dental evaluation vs OMFS after hospital discharge. But pt will likely be unable to afford this. 3. Normocytic: 2/2 chemotherapy. -- Continue to monitor and transfuse for Hgb < 7    4. Thrombocytopenia: 2/2 to treatment and infection    5. Neoplasm related pain / Anxiety: Left lower back pain. Takes Oxycodone and Ativan at home  -- Continue home meds and monitor    6.  HTN / Hyperlipidemia: Management per hospitalist      Signed By: Tiarra Roman MD

## 2024-10-23 NOTE — PROGRESS NOTES
[facility] Progress Note    Date: 2023    Name: Rupert Daley. MRN: [id number]         : 1975:            Mr. Marcus Feliciano arrived ambulatory and in no distress for Pump Removal.  Assessment was completed, no acute issues at this time, no new complaints voiced. CADD completed in infusion center- 100 ml infused per order. Mr. Maddy Bettencourt vitals were reviewed. Visit Vitals  /70 (BP 1 Location: Right arm, BP Patient Position: Sitting)   Pulse 75   Temp 98 °F (36.7 °C)   Resp 16   SpO2 100%       Mr. Marcus Feliciano tolerated treatment well and was discharged from [place] [address] in stable condition at 1500. Port de-accessed, flushed & heparinized per protocol. He is to return on  at 0730 for his next appointment.     [facility]  2023 1

## 2025-01-02 NOTE — PERIOP NOTES
Harvey Cheek (1982) initiated an asynchronous digital communication through [employer].    HPI: per patient questionnaire     Exam: not applicable    Diagnoses and all orders for this visit:  Diagnoses and all orders for this visit:    COVID-19    Other orders  -     nirmatrelvir/ritonavir 300/100 (PAXLOVID, 300/100,) 20 x 150 MG & 10 x 100MG TBPK; Take 3 tablets (two 150 mg nirmatrelvir and one 100 mg ritonavir tablets) by mouth every 12 hours for 5 days.      + covid. Sent paxlovid. F/u with pcp     Time: EV3 - 21 or more minutes were spent on the digital evaluation and management of this patient.22 min     NICKI Frederick - CNP    Spoke with pt's authorized contact and provided updates on surgical procedure.

## 2025-05-13 NOTE — PATIENT INSTRUCTIONS
Dear Zackary Blackmon ,    It was a pleasure seeing you today for an office visit. We will see you again in 1 week for an office visit to coordinate with your infusion. If labs or imaging tests have been ordered for you today, please call the office  at [phone] 48 hours after completion to obtain the results. Your described symptoms were: Fatigue: 6       Pain: 6 (Increases to 10/10 with standing)   Anxiety: 10 Nausea: 0   Anorexia: 6       Constipation: No           This is the plan we talked about:    1. Abdominal pain/neuropathic pain  -Continue oxycodone 10-mg: increase to 2 tabs every 4 hours as needed  -Continue extended-release oxycodone to 20-mg every 12 hours  -Start gabapentin 300-mg at bedtime for the neuropathic component of your pain. 2. Low back pain  -Chronic since lymphoma diagnosis with escalation of pain after colon cancer diagnosis  -Continue oxycodone as above    3. Depression/anxiety  -You have chronic anxiety which has increased with the news of your cancer progression and your increased pain  -You've met with our clinical , Deisi Adames, in the past  -Continue Lexapro 20-mg daily  -I'm avoiding benzodiazepines due to synergistic effect with opioids    4. Poor appetite/weight loss  -Your appetite is poor and you've lost 10 pounds recently  -Today I made a referral to Umm Orozco, the [facility] dietician    6. Fatigue  -This is chronic and unchanged   -You're sleeping about 5 hours at night, except on nights when you take dexamethasone    7. Nausea  -Continue ondansetron 8-mg every 8 hours as needed  -Continue prochlorperazine 10-mg every 6 hours as needed    8.  Colon mass  -You're receiving chemotherapy under the care of Dr. Fausto Castro  -Your scans on 12/22 showed progressive disease  -You return next Monday for your cancer treatment    This is what you have shared with us about Advance Care Planning:      Primary Decision Maker: Alis Yang - Girlfriend - [phone]  Advance Care Planning 12/14/2022   Patient's Healthcare Decision Maker is: Legal Next of Kin   Confirm Advance Directive Yes, on file   Patient Would Like to Complete Advance Directive -   Does the patient have other document types -           The Palliative Medicine Team is here to support you and your family.        Sincerely,      Enriqueta Kayser, MD and the Palliative Medicine [facility] For information on Fall & Injury Prevention, visit: [email or web address] OR  [email or web address] OR  https://www.cdc.gov/steadi/patient.html

## 2025-07-03 NOTE — TELEPHONE ENCOUNTER
[address] at [place]  [phone]        01/23/23 10:11 AM Called pathology to follow up on status of NRAS and BRAF testing. Spoke with Ysabel Lai. She was unable to verify anticipated result date per [employer] site. Ysabel Lai stated she would follow up with pathologist and call this nurse back with update. Provided office phone number as well. 10:51 AM Received return call, see other telephone encounter. Will continue to monitor. 7 (severe pain)

## (undated) DEVICE — LAPAROSCOPIC TROCAR SLEEVE/SINGLE USE: Brand: KII® OPTICAL ACCESS SYSTEM

## (undated) DEVICE — DBD-PACK,LAPAROTOMY,2 REINFORCED GOWNS: Brand: MEDLINE

## (undated) DEVICE — PACK PROCEDURE SURG HRT CATH

## (undated) DEVICE — VISUALIZATION SYSTEM: Brand: CLEARIFY

## (undated) DEVICE — KIT MED IMAG CNTRST AGNT W/ IOPAMIDOL REUSE

## (undated) DEVICE — SURGICAL PROCEDURE PACK BASIN MAJ SET CUST NO CAUT

## (undated) DEVICE — POWER SHELL: Brand: SIGNIA

## (undated) DEVICE — TROCAR: Brand: KII® OPTICAL ACCESS SYSTEM

## (undated) DEVICE — SUTURE PDS II SZ 4-0 L27IN ABSRB VLT L17MM RB-1 1/2 CIR Z304H

## (undated) DEVICE — GLOVE ORANGE PI 7 1/2   MSG9075

## (undated) DEVICE — NEU- GOOD SAM HOSPITAL: Brand: MEDLINE INDUSTRIES, INC.

## (undated) DEVICE — Device

## (undated) DEVICE — CATHETER GUID 6FR L100CM DIA0.071IN NYL SHFT EBU3.5 W/ SIDE

## (undated) DEVICE — SUTURE VCRL SZ 3-0 L27IN ABSRB UD L26MM SH 1/2 CIR J416H

## (undated) DEVICE — STERILE-Z MAYO STAND COVERS CLEAR POLYETHYLENE STERILE UNIVERSAL FIT 20 PER CASE: Brand: STERILE-Z

## (undated) DEVICE — SUTURE PDS II SZ 1 L27IN ABSRB VLT CT-1 L36MM 1/2 CIR Z341H

## (undated) DEVICE — PENCIL SMK EVAC 10 FT BLADE ELECTRD ROCKER FOR TELSCP

## (undated) DEVICE — DERMABOND SKIN ADH 0.7ML -- DERMABOND ADVANCED 12/BX

## (undated) DEVICE — INSUFFLATION NEEDLE TO ESTABLISH PNEUMOPERITONEUM.: Brand: INSUFFLATION NEEDLE

## (undated) DEVICE — COLON CLOSING PACK: Brand: MEDLINE INDUSTRIES, INC.

## (undated) DEVICE — (D)PREP SKN CHLRAPRP APPL 26ML -- CONVERT TO ITEM 371833

## (undated) DEVICE — NEEDLE HYPO 25GA L1.5IN BVL ORIENTED ECLIPSE

## (undated) DEVICE — 40580 - THE PINK PAD - ADVANCED TRENDELENBURG POSITIONING KIT: Brand: 40580 - THE PINK PAD - ADVANCED TRENDELENBURG POSITIONING KIT

## (undated) DEVICE — ROCKER SWITCH PENCIL BLADE ELECTRODE, HOLSTER: Brand: EDGE

## (undated) DEVICE — TOWEL SURG W17XL27IN STD BLU COT NONFENESTRATED PREWASHED

## (undated) DEVICE — COVER LT HNDL PLAS RIG 1 PER PK

## (undated) DEVICE — KIT HND CTRL 3 W STPCOCK ROT END 54IN PREM HI PRSS TBNG AT

## (undated) DEVICE — 1200 GUARD II KIT W/5MM TUBE W/O VAC TUBE: Brand: GUARDIAN

## (undated) DEVICE — GLOVE SURG SZ 8 L12IN FNGR THK79MIL GRN LTX FREE

## (undated) DEVICE — SUTURE PERMAHAND SZ 3-0 L18IN NONABSORBABLE BLK L26MM SH C013D

## (undated) DEVICE — DEVON™ KNEE AND BODY STRAP 60" X 3" (1.5 M X 7.6 CM): Brand: DEVON

## (undated) DEVICE — REM POLYHESIVE ADULT PATIENT RETURN ELECTRODE: Brand: VALLEYLAB

## (undated) DEVICE — TOTAL TRAY, 16FR 10ML SIL FOLEY, URN: Brand: MEDLINE

## (undated) DEVICE — SYSTEM EVAC SMOKE LAPARSCOPIC

## (undated) DEVICE — INFECTION CONTROL KIT SYS

## (undated) DEVICE — STERILE POLYISOPRENE POWDER-FREE SURGICAL GLOVES WITH EMOLLIENT COATING: Brand: PROTEXIS

## (undated) DEVICE — SUTURE MCRYL SZ 4-0 L27IN ABSRB UD L19MM PS-2 1/2 CIR PRIM Y426H

## (undated) DEVICE — TROCAR: Brand: KII® SLEEVE

## (undated) DEVICE — SUTURE VCRL SZ 3-0 L54IN ABSRB VLT LIGAPAK REEL NDL J205G

## (undated) DEVICE — SYR 10ML LUER LOK 1/5ML GRAD --

## (undated) DEVICE — MARYLAND JAW LAPAROSCOPIC SEALER/DIVIDER COATED: Brand: LIGASURE

## (undated) DEVICE — SOLUTION IV 1000ML 0.9% SOD CHL

## (undated) DEVICE — ANGIOGRAPHY KIT

## (undated) DEVICE — KIT MFLD ISOLATN NACL CNTRST PRT TBNG SPIK W/ PRSS TRNSDUC

## (undated) DEVICE — RUNTHROUGH NS EXTRA FLOPPY PTCA GUIDEWIRE: Brand: RUNTHROUGH

## (undated) DEVICE — GARMENT,MEDLINE,DVT,INT,CALF,MED, GEN2: Brand: MEDLINE

## (undated) DEVICE — 4-PORT MANIFOLD: Brand: NEPTUNE 2

## (undated) DEVICE — PINNACLE PRECISION ACCESS SYSTEM INTRODUCER SHEATH: Brand: PINNACLE PRECISION ACCESS SYSTEM

## (undated) DEVICE — GENERAL LAPAROSCOPY - SMH: Brand: MEDLINE INDUSTRIES, INC.

## (undated) DEVICE — CLIP LIG ADH PD HORZ MED BLU --

## (undated) DEVICE — CATH ANGIO IMPLS PGTL 6FR 100 -- IMPULSE 16599-300